# Patient Record
Sex: FEMALE | Race: BLACK OR AFRICAN AMERICAN | NOT HISPANIC OR LATINO | Employment: OTHER | URBAN - METROPOLITAN AREA
[De-identification: names, ages, dates, MRNs, and addresses within clinical notes are randomized per-mention and may not be internally consistent; named-entity substitution may affect disease eponyms.]

---

## 2017-01-05 ENCOUNTER — TELEPHONE (OUTPATIENT)
Dept: OPHTHALMOLOGY | Facility: CLINIC | Age: 72
End: 2017-01-05

## 2017-01-05 ENCOUNTER — OFFICE VISIT (OUTPATIENT)
Dept: FAMILY MEDICINE | Facility: CLINIC | Age: 72
End: 2017-01-05
Payer: MEDICARE

## 2017-01-05 ENCOUNTER — LAB VISIT (OUTPATIENT)
Dept: LAB | Facility: HOSPITAL | Age: 72
End: 2017-01-05
Attending: FAMILY MEDICINE
Payer: MEDICARE

## 2017-01-05 VITALS
DIASTOLIC BLOOD PRESSURE: 67 MMHG | TEMPERATURE: 98 F | RESPIRATION RATE: 16 BRPM | HEART RATE: 93 BPM | HEIGHT: 66 IN | WEIGHT: 156.06 LBS | BODY MASS INDEX: 25.08 KG/M2 | SYSTOLIC BLOOD PRESSURE: 131 MMHG | OXYGEN SATURATION: 98 %

## 2017-01-05 DIAGNOSIS — M70.61 TROCHANTERIC BURSITIS OF RIGHT HIP: ICD-10-CM

## 2017-01-05 DIAGNOSIS — N18.30 CKD (CHRONIC KIDNEY DISEASE) STAGE 3, GFR 30-59 ML/MIN: ICD-10-CM

## 2017-01-05 DIAGNOSIS — S46.911A MUSCLE STRAIN, SHOULDER REGION, RIGHT, INITIAL ENCOUNTER: Primary | ICD-10-CM

## 2017-01-05 DIAGNOSIS — Z23 IMMUNIZATION DUE: ICD-10-CM

## 2017-01-05 LAB
ANION GAP SERPL CALC-SCNC: 7 MMOL/L
BUN SERPL-MCNC: 21 MG/DL
CALCIUM SERPL-MCNC: 9.4 MG/DL
CHLORIDE SERPL-SCNC: 107 MMOL/L
CO2 SERPL-SCNC: 28 MMOL/L
CREAT SERPL-MCNC: 0.9 MG/DL
EST. GFR  (AFRICAN AMERICAN): >60 ML/MIN/1.73 M^2
EST. GFR  (NON AFRICAN AMERICAN): >60 ML/MIN/1.73 M^2
GLUCOSE SERPL-MCNC: 94 MG/DL
POTASSIUM SERPL-SCNC: 4.5 MMOL/L
SODIUM SERPL-SCNC: 142 MMOL/L

## 2017-01-05 PROCEDURE — 80048 BASIC METABOLIC PNL TOTAL CA: CPT

## 2017-01-05 PROCEDURE — 99214 OFFICE O/P EST MOD 30 MIN: CPT | Mod: S$GLB,,, | Performed by: FAMILY MEDICINE

## 2017-01-05 PROCEDURE — 99499 UNLISTED E&M SERVICE: CPT | Mod: S$PBB,,, | Performed by: FAMILY MEDICINE

## 2017-01-05 PROCEDURE — 1159F MED LIST DOCD IN RCRD: CPT | Mod: S$GLB,,, | Performed by: FAMILY MEDICINE

## 2017-01-05 PROCEDURE — 1126F AMNT PAIN NOTED NONE PRSNT: CPT | Mod: S$GLB,,, | Performed by: FAMILY MEDICINE

## 2017-01-05 PROCEDURE — 3078F DIAST BP <80 MM HG: CPT | Mod: S$GLB,,, | Performed by: FAMILY MEDICINE

## 2017-01-05 PROCEDURE — 1157F ADVNC CARE PLAN IN RCRD: CPT | Mod: S$GLB,,, | Performed by: FAMILY MEDICINE

## 2017-01-05 PROCEDURE — G0009 ADMIN PNEUMOCOCCAL VACCINE: HCPCS | Mod: S$GLB,,, | Performed by: FAMILY MEDICINE

## 2017-01-05 PROCEDURE — 36415 COLL VENOUS BLD VENIPUNCTURE: CPT | Mod: PO

## 2017-01-05 PROCEDURE — 3075F SYST BP GE 130 - 139MM HG: CPT | Mod: S$GLB,,, | Performed by: FAMILY MEDICINE

## 2017-01-05 PROCEDURE — 90670 PCV13 VACCINE IM: CPT | Mod: S$GLB,,, | Performed by: FAMILY MEDICINE

## 2017-01-05 PROCEDURE — 99999 PR PBB SHADOW E&M-EST. PATIENT-LVL III: CPT | Mod: PBBFAC,,, | Performed by: FAMILY MEDICINE

## 2017-01-05 PROCEDURE — 1160F RVW MEDS BY RX/DR IN RCRD: CPT | Mod: S$GLB,,, | Performed by: FAMILY MEDICINE

## 2017-01-05 RX ORDER — ASPIRIN 81 MG/1
162 TABLET ORAL DAILY
COMMUNITY
End: 2020-09-14

## 2017-01-05 RX ORDER — TIZANIDINE 4 MG/1
4 TABLET ORAL 3 TIMES DAILY PRN
Qty: 60 TABLET | Refills: 2 | Status: SHIPPED | OUTPATIENT
Start: 2017-01-05 | End: 2017-01-15

## 2017-01-05 NOTE — TELEPHONE ENCOUNTER
----- Message from Joanie Jorge sent at 1/5/2017  2:18 PM CST -----  Contact: self [phone]  Patient is requesting a call back from the nurse stated concerns about cataract surgery.    Please call the patient upon request at phone number [phone].

## 2017-01-05 NOTE — MR AVS SNAPSHOT
[place] [facility]  [address]  [place] [address]  Phone: [phone]  Fax: [phone]                  Erica Masterson   2017 9:40 AM   Office Visit    Description:  Female : 1945   Provider:  Narayan Myers MD   Department:  [place] [facility]           Reason for Visit     Shoulder Pain     Chronic Kidney Disease           Diagnoses this Visit        Comments    Muscle strain, shoulder region, right, initial encounter    -  Primary     Trochanteric bursitis of right hip         Immunization due         CKD (chronic kidney disease) stage 3, GFR 30-59 ml/min         BMI 25.0-25.9,adult                To Do List           Future Appointments        Provider Department Dept Phone    2017 1:45 PM [facility] MARTINEZ [facility] [place] [facility] - Lab [phone]    2017 9:00 AM [facility] [phone]    2017 9:30 AM Adriel Iverson MD [place] - Orthopedics [phone]    2017 3:00 PM [facility] [phone]      Goals (5 Years of Data)     None       These Medications        Disp Refills Start End    tizanidine (ZANAFLEX) 4 MG tablet 60 tablet 2 2017 1/15/2017    Take 1 tablet (4 mg total) by mouth 3 (three) times daily as needed (muscle pain). - Oral    Pharmacy: [id number] - [place] - [address]. Ph #: [phone]         [facility] On Call     [facility] On Call Nurse Care Line -  Assistance  Registered nurses in the [facility] On Call Center provide clinical advisement, health education, appointment booking, and other advisory services.  Call for this free service at [phone].             Medications           Message regarding Medications     Verify the changes and/or additions to your medication regime listed below are the same as discussed with your clinician today.  If any of these changes or additions are incorrect, please notify your  "healthcare provider.        START taking these NEW medications        Refills    tizanidine (ZANAFLEX) 4 MG tablet 2    Sig: Take 1 tablet (4 mg total) by mouth 3 (three) times daily as needed (muscle pain).    Class: Normal    Route: Oral      STOP taking these medications     lorazepam (ATIVAN) 1 MG tablet Take 0.5 mg by mouth every 8 (eight) hours as needed for Anxiety.    trazodone (DESYREL) 50 MG tablet Take 1 tablet (50 mg total) by mouth every evening.    valacyclovir (VALTREX) 1000 MG tablet One tab PO TWICE DAILY x 2 days PRN flare           Verify that the below list of medications is an accurate representation of the medications you are currently taking.  If none reported, the list may be blank. If incorrect, please contact your healthcare provider. Carry this list with you in case of emergency.           Current Medications     aspirin (ECOTRIN) 81 MG EC tablet Take 81 mg by mouth once daily.    atorvastatin (LIPITOR) 40 MG tablet TAKE ONE TABLET BY MOUTH ONCE DAILY    lisinopril (PRINIVIL,ZESTRIL) 20 MG tablet Take 1 tablet (20 mg total) by mouth once daily.    multivitamin capsule Take 1 capsule by mouth once daily. 1 Capsule(s) Oral  Every day.    penciclovir (DENAVIR) 1 % cream Apply topically every 2 (two) hours.    povidone (SOOTHE HYDRATION) 1.25 % Drop Apply 1 drop to eye once daily.     clopidogrel (PLAVIX) 75 mg tablet Take 1 tablet (75 mg total) by mouth once daily.    tizanidine (ZANAFLEX) 4 MG tablet Take 1 tablet (4 mg total) by mouth 3 (three) times daily as needed (muscle pain).           Clinical Reference Information           Vital Signs - Last Recorded  Most recent update: 1/5/2017  9:54 AM by Lesley Rubin MA    BP Pulse Temp Resp Ht Wt    131/67 (BP Location: Right arm, Patient Position: Sitting, BP Method: Automatic) 93 97.6 °F (36.4 °C) (Oral) 16 5' 6" (1.676 m) 70.8 kg (156 lb 1.4 oz)    SpO2 BMI             98% 25.19 kg/m2         Blood Pressure          Most Recent Value    BP "  131/67      Allergies as of 1/5/2017     No Known Drug Allergies      Immunizations Administered on Date of Encounter - 1/5/2017     Name Date Dose VIS Date Route    Pneumococcal Conjugate - 13 Valent 1/5/2017 0.5 mL 11/5/2015 Intramuscular      Orders Placed During Today's Visit      Normal Orders This Visit    Pneumococcal Conjugate Vaccine (13 Valent) (IM)     Future Labs/Procedures Expected by Expires    Basic metabolic panel  1/5/2017 1/6/2018

## 2017-01-05 NOTE — PROGRESS NOTES
Subjective:       Patient ID: Erica Masterson is a 71 y.o. female.    Chief Complaint: Shoulder Pain (right ) and Chronic Kidney Disease    HPI Comments: 71 year old female in for several problems.  She was having scattered areas of numbness over the entire body particularly the hands and feet as well as the right shoulder.  She went to the ER at [facility] where workup was negative and she was told to take plavix in place of aspirin.  It did not help but she started getting massages several times a week and that resolved the problem except for pain and myalgia in the right upper shoulder area.  She has an appt upcoming with dr Iverson for that.  She would like to resume the aspirin and stop the plavix due to cost if possible.  She is also concerned about stage three ckd, last checked in September.    Past Medical History:    Allergy                                                       Cataract                                                      Colon polyp                                                   Degenerative arthritis of knee                  4/3/2012      Depression                                      3/26/2012     Diverticulosis                                                GERD (gastroesophageal reflux disease)                        Hyperlipidemia                                                Hypertension                                                  Multinodular goiter                             3/26/2012     Myopathy, unspecified                           1/18/2010     Tuberculosis                                                  Past Surgical History:    FOOT SURGERY                                                   HYSTERECTOMY                                                   SHOULDER SURGERY                                                 Comment:francis     LUNG LOBECTOMY                                   1966            Comment:right middle lobectomy, due to Tb    KNEE SURGERY                                      06/06/2013      Comment:right knee tear Dr Iverson     COLONOSCOPY                                      12/2/15         Comment:Dr. Britton, multiple polyps, recheck five                years-three years if more than 2 polyps are                adenomatous    COLONOSCOPY                                     N/A 12/2/2015     DEXA SCAN due on 09/14/2015  Due for prevacid also      Shoulder Pain    Associated symptoms include numbness.     Review of Systems   Musculoskeletal: Positive for arthralgias and myalgias.   Neurological: Positive for numbness.       Objective:      Physical Exam   Constitutional: She appears well-developed and well-nourished. No distress.   Good blood pressure control  Patient is normal weight with bmi of 25.2.   Weight is increased by 0.7lb since last visit of 9/27/16.     Musculoskeletal:        Right shoulder: She exhibits tenderness (tender over upper trapezius with palpable trigger points, upper pectoralis major and serratus anterior) and crepitus (mild). She exhibits normal range of motion, no bony tenderness, no swelling, no effusion, no deformity and no spasm.        Left shoulder: Normal.   Skin: Skin is warm and dry. She is not diaphoretic.   Psychiatric: She has a normal mood and affect. Her behavior is normal. Judgment and thought content normal.   Nursing note and vitals reviewed.      Assessment:       1. Muscle strain, shoulder region, right, initial encounter    2. Trochanteric bursitis of right hip    3. Immunization due    4. CKD (chronic kidney disease) stage 3, GFR 30-59 ml/min    5. BMI 25.0-25.9,adult        Plan:       1. Muscle strain, shoulder region, right, initial encounter  - tizanidine (ZANAFLEX) 4 MG tablet; Take 1 tablet (4 mg total) by mouth 3 (three) times daily as needed (muscle pain).  Dispense: 60 tablet; Refill: 2    2. Trochanteric bursitis of right hip    3. Immunization due  - Pneumococcal Conjugate Vaccine (13 Valent)  (IM)    4. CKD (chronic kidney disease) stage 3, GFR 30-59 ml/min  - Basic metabolic panel; Future    5. BMI 25.0-25.9,adult

## 2017-01-16 DIAGNOSIS — E78.5 HYPERLIPIDEMIA, UNSPECIFIED HYPERLIPIDEMIA TYPE: ICD-10-CM

## 2017-01-16 RX ORDER — ATORVASTATIN CALCIUM 40 MG/1
40 TABLET, FILM COATED ORAL DAILY
Qty: 90 TABLET | Refills: 2 | Status: SHIPPED | OUTPATIENT
Start: 2017-01-16 | End: 2018-01-14 | Stop reason: SDUPTHER

## 2017-01-23 ENCOUNTER — INITIAL CONSULT (OUTPATIENT)
Dept: OPHTHALMOLOGY | Facility: CLINIC | Age: 72
End: 2017-01-23
Payer: MEDICARE

## 2017-01-23 DIAGNOSIS — H25.13 NUCLEAR SCLEROSIS, BILATERAL: ICD-10-CM

## 2017-01-23 DIAGNOSIS — H26.9 CORTICAL CATARACT OF BOTH EYES: ICD-10-CM

## 2017-01-23 DIAGNOSIS — H52.4 HYPEROPIA WITH ASTIGMATISM AND PRESBYOPIA, BILATERAL: ICD-10-CM

## 2017-01-23 DIAGNOSIS — H52.203 HYPEROPIA WITH ASTIGMATISM AND PRESBYOPIA, BILATERAL: ICD-10-CM

## 2017-01-23 DIAGNOSIS — H52.03 HYPEROPIA WITH ASTIGMATISM AND PRESBYOPIA, BILATERAL: ICD-10-CM

## 2017-01-23 DIAGNOSIS — H40.003 GLAUCOMA SUSPECT, BILATERAL: Primary | ICD-10-CM

## 2017-01-23 PROCEDURE — 99499 UNLISTED E&M SERVICE: CPT | Mod: S$PBB,,, | Performed by: OPHTHALMOLOGY

## 2017-01-23 PROCEDURE — 92134 CPTRZ OPH DX IMG PST SGM RTA: CPT | Mod: S$GLB,,, | Performed by: OPHTHALMOLOGY

## 2017-01-23 PROCEDURE — 99999 PR PBB SHADOW E&M-EST. PATIENT-LVL II: CPT | Mod: PBBFAC,,, | Performed by: OPHTHALMOLOGY

## 2017-01-23 PROCEDURE — 92015 DETERMINE REFRACTIVE STATE: CPT | Mod: S$GLB,,, | Performed by: OPHTHALMOLOGY

## 2017-01-23 PROCEDURE — 92014 COMPRE OPH EXAM EST PT 1/>: CPT | Mod: S$GLB,,, | Performed by: OPHTHALMOLOGY

## 2017-01-23 NOTE — MR AVS SNAPSHOT
[place] [facility] - Ophthalmology  [address] [place] [address] Drive [address]  [place] [address]  Phone: [phone]                  Erica Masterson   2017 10:00 AM   Initial consult    Description:  Female : 1945   Provider:  Ivory Munoz MD   Department:  [place] [facility] - Ophthalmology           Reason for Visit     Concerns About Ocular Health           Diagnoses this Visit        Comments    Glaucoma suspect, bilateral    -  Primary     Nuclear sclerosis, bilateral         Cortical cataract of both eyes         Hyperopia with astigmatism and presbyopia, bilateral                To Do List           Future Appointments        Provider Department Dept Phone    2017 9:15 AM Adriel Iverson MD [place] - Orthopedics [phone]    2017 3:00 PM MD [place] - Endo/Diabetes [phone]      Goals (5 Years of Data)     None      Follow-Up and Disposition     Return for HVF, IOP check.      [facility] On Call     [facility] On Call Nurse [facility] Line -  Assistance  Registered nurses in the [facility] On Call Center provide clinical advisement, health education, appointment booking, and other advisory services.  Call for this free service at [phone].             Medications           Message regarding Medications     Verify the changes and/or additions to your medication regime listed below are the same as discussed with your clinician today.  If any of these changes or additions are incorrect, please notify your healthcare provider.        STOP taking these medications     penciclovir (DENAVIR) 1 % cream Apply topically every 2 (two) hours.    clopidogrel (PLAVIX) 75 mg tablet Take 1 tablet (75 mg total) by mouth once daily.           Verify that the below list of medications is an accurate representation of the medications you are currently taking.  If none reported, the list may be blank. If incorrect, please contact your healthcare provider. Carry this list  with you in case of emergency.           Current Medications     aspirin (ECOTRIN) 81 MG EC tablet Take 162 mg by mouth once daily.     atorvastatin (LIPITOR) 40 MG tablet Take 1 tablet (40 mg total) by mouth once daily.    lisinopril (PRINIVIL,ZESTRIL) 20 MG tablet Take 1 tablet (20 mg total) by mouth once daily.    multivitamin capsule Take 1 capsule by mouth once daily. 1 Capsule(s) Oral  Every day.    povidone (SOOTHE HYDRATION) 1.25 % Drop Apply 1 drop to eye once daily.            Clinical Reference Information           Allergies as of 1/23/2017     No Known Drug Allergies      Immunizations Administered on Date of Encounter - 1/23/2017     None      Orders Placed During Today's Visit      Normal Orders This Visit    [place] Retina Tomography (HRT) - OU - Both Eyes     Future Labs/Procedures Expected by Expires    [facility] Visual Field - OU - Extended - Both Eyes  As directed 1/23/2018

## 2017-01-23 NOTE — PROGRESS NOTES
HPI     Concerns About Ocular Health    Additional comments: Ocular health exam           Comments   [facility]    Pt states has been having trouble vision at night x 1 yr.  + h/o floaters   x 2 yrs, no flashes.   + eye itch x 1-2 yrs, no tearing or burning. Uses Soothe QAM OU.     [facility] to [facility], was told she might need cataract surgery in a few   months so did not give her glasses Rx. Only drives at night if she has to,   has had trouble turning off the wrong street at the wrong place. Takes   aerobic class in evening, ends after dark.       Last edited by Ivory Munoz MD on 1/23/2017 10:55 AM.   (History)        ROS     Positive for: Gastrointestinal, Neurological (hereditary peripheral   neuropathy; denies CVA/seizure/tremor/restless legs), Cardiovascular (HTN   - controlled with meds; PAD), Heme/Lymph (ASA, no longer on plavix)    Negative for: Genitourinary (denies flomax), Endocrine (denies DM), Eyes   (denies surgery or trauma), Respiratory (denies asthma/orthopnea)    Last edited by Ivory Munoz MD on 1/23/2017 10:55 AM.   (History)        Assessment /Plan     For exam results, see Encounter Report.    Glaucoma suspect, bilateral  -     [place] Retina Tomography (HRT) - OU - Both Eyes  -     [facility] Visual Field - OU - Extended - Both Eyes; Future    Nuclear sclerosis, bilateral    Cortical cataract of both eyes    Hyperopia with astigmatism and presbyopia, bilateral          Glaucoma suspect, bilateral - C:D ratio appears increased from others documented in epic. Pt was followed before for glaucoma suspect by Dr. Stoddard, testing done back in 2006. IOP WNL. No known family history. HRT today suggests some RNFL thinning OD. RTC for HVF with IOP check.   -     [place] Retina Tomography (HRT) - OU - Both Eyes  -     [facility] Visual Field - OU - Extended - Both Eyes; Future    Nuclear sclerosis, bilateral - BAT may be visually significant OS, but not  "OD. Do not recommend cataract surgery at this time, as BCVA is good, and to do surgery on only one eye would leave her with anisometropia. Current spectacle lenses very scratched. Recommend updating spectacles, observe. Dilates only to ~4.5 mm despite denies flomax.     Cortical cataract of both eyes - "    Hyperopia with astigmatism and presbyopia, bilateral - MRx given                     "

## 2017-01-30 ENCOUNTER — HOSPITAL ENCOUNTER (OUTPATIENT)
Dept: RADIOLOGY | Facility: HOSPITAL | Age: 72
Discharge: HOME | End: 2017-01-30
Attending: ORTHOPAEDIC SURGERY
Payer: MEDICARE

## 2017-01-30 ENCOUNTER — OFFICE VISIT (OUTPATIENT)
Dept: ORTHOPEDICS | Facility: CLINIC | Age: 72
End: 2017-01-30
Payer: MEDICARE

## 2017-01-30 VITALS
DIASTOLIC BLOOD PRESSURE: 63 MMHG | HEART RATE: 71 BPM | HEIGHT: 66 IN | SYSTOLIC BLOOD PRESSURE: 136 MMHG | BODY MASS INDEX: 25.07 KG/M2 | WEIGHT: 156 LBS

## 2017-01-30 DIAGNOSIS — M25.561 RIGHT KNEE PAIN, UNSPECIFIED CHRONICITY: ICD-10-CM

## 2017-01-30 DIAGNOSIS — M75.101 ROTATOR CUFF SYNDROME, RIGHT: ICD-10-CM

## 2017-01-30 DIAGNOSIS — M25.561 RIGHT KNEE PAIN, UNSPECIFIED CHRONICITY: Primary | ICD-10-CM

## 2017-01-30 DIAGNOSIS — M25.551 RIGHT HIP PAIN: ICD-10-CM

## 2017-01-30 DIAGNOSIS — M17.11 PRIMARY OSTEOARTHRITIS OF RIGHT KNEE: Primary | ICD-10-CM

## 2017-01-30 PROCEDURE — 99214 OFFICE O/P EST MOD 30 MIN: CPT | Mod: S$GLB,,, | Performed by: ORTHOPAEDIC SURGERY

## 2017-01-30 PROCEDURE — 3078F DIAST BP <80 MM HG: CPT | Mod: S$GLB,,, | Performed by: ORTHOPAEDIC SURGERY

## 2017-01-30 PROCEDURE — 99999 PR PBB SHADOW E&M-EST. PATIENT-LVL III: CPT | Mod: PBBFAC,,, | Performed by: ORTHOPAEDIC SURGERY

## 2017-01-30 PROCEDURE — 1125F AMNT PAIN NOTED PAIN PRSNT: CPT | Mod: S$GLB,,, | Performed by: ORTHOPAEDIC SURGERY

## 2017-01-30 PROCEDURE — 73502 X-RAY EXAM HIP UNI 2-3 VIEWS: CPT | Mod: 26,RT,, | Performed by: RADIOLOGY

## 2017-01-30 PROCEDURE — 1159F MED LIST DOCD IN RCRD: CPT | Mod: S$GLB,,, | Performed by: ORTHOPAEDIC SURGERY

## 2017-01-30 PROCEDURE — 73502 X-RAY EXAM HIP UNI 2-3 VIEWS: CPT | Mod: TC,PN,RT

## 2017-01-30 PROCEDURE — 3075F SYST BP GE 130 - 139MM HG: CPT | Mod: S$GLB,,, | Performed by: ORTHOPAEDIC SURGERY

## 2017-01-30 PROCEDURE — 1160F RVW MEDS BY RX/DR IN RCRD: CPT | Mod: S$GLB,,, | Performed by: ORTHOPAEDIC SURGERY

## 2017-01-30 PROCEDURE — 73564 X-RAY EXAM KNEE 4 OR MORE: CPT | Mod: 26,RT,, | Performed by: RADIOLOGY

## 2017-01-30 PROCEDURE — 73564 X-RAY EXAM KNEE 4 OR MORE: CPT | Mod: TC,PN,RT

## 2017-01-30 PROCEDURE — 1157F ADVNC CARE PLAN IN RCRD: CPT | Mod: S$GLB,,, | Performed by: ORTHOPAEDIC SURGERY

## 2017-01-30 PROCEDURE — 73562 X-RAY EXAM OF KNEE 3: CPT | Mod: 26,59,LT, | Performed by: RADIOLOGY

## 2017-01-30 NOTE — PROGRESS NOTES
Past Medical History   Diagnosis Date    Allergy     Cataract     Colon polyp     Degenerative arthritis of knee 4/3/2012    Depression 3/26/2012    Diverticulosis     GERD (gastroesophageal reflux disease)     Hyperlipidemia     Hypertension     Multinodular goiter 3/26/2012    Myopathy, unspecified 1/18/2010    Tuberculosis        Past Surgical History   Procedure Laterality Date    Foot surgery      Hysterectomy      Shoulder surgery       francis     Lung lobectomy  1966     right middle lobectomy, due to Tb    Knee surgery  06/06/2013     right knee tear Dr Iverson     Colonoscopy  12/2/15     Dr. Britton, multiple polyps, recheck five years-three years if more than 2 polyps are adenomatous    Colonoscopy N/A 12/2/2015       Current Outpatient Prescriptions   Medication Sig    aspirin (ECOTRIN) 81 MG EC tablet Take 162 mg by mouth once daily.     atorvastatin (LIPITOR) 40 MG tablet Take 1 tablet (40 mg total) by mouth once daily.    lisinopril (PRINIVIL,ZESTRIL) 20 MG tablet Take 1 tablet (20 mg total) by mouth once daily.    multivitamin capsule Take 1 capsule by mouth once daily. 1 Capsule(s) Oral  Every day.    povidone (SOOTHE HYDRATION) 1.25 % Drop Apply 1 drop to eye once daily.      No current facility-administered medications for this visit.        Review of patient's allergies indicates:   Allergen Reactions    No known drug allergies        Family History   Problem Relation Age of Onset    Colon cancer Other     Cancer Other     Cancer Mother     Hypertension Mother     Cancer Brother     Hypertension Father     Diabetes Son     Hypertension Son     Diabetes Maternal Aunt     Alzheimer's disease Maternal Uncle     Cancer Maternal Grandmother     No Known Problems Daughter     No Known Problems Maternal Grandfather     No Known Problems Paternal Grandmother     No Known Problems Paternal Grandfather     Cancer Sister      breast    Dementia Sister     Alzheimer's  disease Sister     Obesity Paternal Uncle     Melanoma Neg Hx     Psoriasis Neg Hx     Lupus Neg Hx     Eczema Neg Hx     Amblyopia Neg Hx     Blindness Neg Hx     Cataracts Neg Hx     Glaucoma Neg Hx     Macular degeneration Neg Hx     Retinal detachment Neg Hx     Strabismus Neg Hx     Stroke Neg Hx     Thyroid disease Neg Hx        Social History     Social History    Marital status:      Spouse name: N/A    Number of children: N/A    Years of education: N/A     Occupational History    Not on file.     Social History Main Topics    Smoking status: Former Smoker    Smokeless tobacco: Never Used      Comment: quit in 1963    Alcohol use Yes      Comment: Occasionally    Drug use: No    Sexual activity: Not Currently     Other Topics Concern    Not on file     Social History Narrative       Chief Complaint:   Chief Complaint   Patient presents with    Knee Pain     right knee pain    Hip Pain     right hip pain    Shoulder Pain     right shoulder pain       History of present illness: This is a 70-year-old female who have seen for some issues who now comes in with right shoulder , hip and knee pain.  Pain in the shoulders been present for years now.  Pain laying on it at night.  Unable to reach her arm across her body or above her head.  Pain as a 6 out of 10.  Up to a 9 out of 10 at night.  Also complains of right leg and knee pain.  No injury or trauma.  She's been trying to stretch it without success.  She has a history of prior scope of the right shoulder.  She has a lot of tightness up in the neck.  Patient has been getting massage and doing some aerobics.  She is taking Zanaflex.      Review of Systems:    Constitution: Negative for chills, fever, and sweats.  Negative for unexplained weight loss.    HENT:  Negative for headaches and blurry vision.    Cardiovascular:Negative for chest pain or irregular heart beat. Negative for hypertension.    Respiratory:  Negative for cough  and shortness of breath.    Gastrointestinal: Negative for abdominal pain, heartburn, melena, nausea, and vomitting.    Genitourinary:  Negative bladder incontinence and dysuria.    Musculoskeletal:  See HPI    Neurological: Negative for numbness.    Psychiatric/Behavioral: Negative for depression.  The patient is not nervous/anxious.      Endocrine: Negative for polyuria    Hematologic/Lymphatic: Negative for bleeding problem.  Does not bruise/bleed easily.    Skin: Negative for poor would healing and rash      Physical Examination:    Vital Signs:    Vitals:    01/30/17 0914   BP: 136/63   Pulse: 71       Body mass index is 25.18 kg/(m^2).    This a well-developed, well nourished patient in no acute distress.  They are alert and oriented and cooperative to examination.  Pt. walks without an antalgic gait.      Examination of the right shoulder shows no rashes or erythema. There are no masses, ecchymosis, or atrophy. The patient has full range of motion in forward flexion, external rotation, and internal rotation to the mid T-spine. The patient has mildly positive impingement signs. - Sibley's test. - Speeds test. Nontender to palpation over a.c. joint. Normal stability anteriorly, posteriorly, and negative sulcus sign. Passive range of motion: Forward flexion of 180°, external rotation at 90° of 90°, internal rotation of 50°, and external rotation at 0° of 50°. 2+ radial pulse. Intact axillary, radial, median and ulnar sensation. 5 out of 5 resisted forward flexion, external rotation, and negative lift off test.    Examination of the right knee shows no rashes or erythema. There are no masses ecchymosis or effusion. Patient has full range of motion from 0-130°. Patient is mildly tender to palpation over lateral joint line and nontender to palpation over the medial joint line. Patient has a - Lachman exam, - anterior drawer exam, and - posterior drawer exam. - Nicole's exam. Knee is stable to varus and valgus  stress. 5 out of 5 motor strength. Palpable distal pulses. Intact light touch sensation. Negative Patellofemoral crepitus      X-rays: X-rays the right shoulder are   reviewed which show no sign of acute fracture or injury  X-ray of the right hip are ordered and reviewed which show well-preserved joint space  X-rays of the right knee are ordered and reviewed which show complete lateral joint space narrowing     Assessment:: Right rotator cuff tendinitis  Right knee arthritis     Plan:  I reviewed the x-ray results with her today.  She has a lot of pain in her neck.  She has great strength in her shoulder do not think she has a new tear.  I recommended some physical therapy to see if we can't get her loosened up.  He also talked about her knee arthritis.  I told her that she needs to consider knee replacement in the next several years.  This is causing her calf and thigh pain.  Follow-up as needed.

## 2017-01-30 NOTE — MR AVS SNAPSHOT
[facility] Orthopedics  [address] [place] [address] Drive  [place] [address]  Phone: [phone]                  Erica Masterson   2017 9:15 AM   Office Visit    Description:  Female : 1945   Provider:  Adriel Iverson MD   Department:  [facility] Orthopedics           Reason for Visit     Knee Pain     Hip Pain     Shoulder Pain           Diagnoses this Visit        Comments    Primary osteoarthritis of right knee    -  Primary     Rotator cuff syndrome, right                To Do List           Future Appointments        Provider Department Dept Phone    2017 3:00 PM MD [place] - Endo/Diabetes [phone]    2017 9:00 AM VISUAL MORRELL [place] [facility] - Ophthalmology [phone]    2017 9:30 AM Ivory Munoz MD [place] [address] - Ophthalmology [phone]      Goals (5 Years of Data)     None      [facility] On Call     [facility] On Call Nurse [facility] Line -  Assistance  Registered nurses in the [facility] On Call Center provide clinical advisement, health education, appointment booking, and other advisory services.  Call for this free service at [phone].             Medications           Message regarding Medications     Verify the changes and/or additions to your medication regime listed below are the same as discussed with your clinician today.  If any of these changes or additions are incorrect, please notify your healthcare provider.             Verify that the below list of medications is an accurate representation of the medications you are currently taking.  If none reported, the list may be blank. If incorrect, please contact your healthcare provider. Carry this list with you in case of emergency.           Current Medications     aspirin (ECOTRIN) 81 MG EC tablet Take 162 mg by mouth once daily.     atorvastatin (LIPITOR) 40 MG tablet Take 1 tablet (40 mg total) by mouth once daily.    lisinopril (PRINIVIL,ZESTRIL) 20 MG tablet Take 1  "tablet (20 mg total) by mouth once daily.    multivitamin capsule Take 1 capsule by mouth once daily. 1 Capsule(s) Oral  Every day.    povidone (SOOTHE HYDRATION) 1.25 % Drop Apply 1 drop to eye once daily.            Clinical Reference Information           Vital Signs - Last Recorded  Most recent update: 1/30/2017  9:29 AM by Silvina Garcia LPN    BP Pulse Ht Wt BMI    136/63 71 5' 6" (1.676 m) 70.8 kg (156 lb) 25.18 kg/m2      Blood Pressure          Most Recent Value    BP  136/63      Allergies as of 1/30/2017     No Known Drug Allergies      Immunizations Administered on Date of Encounter - 1/30/2017     None      "

## 2017-02-24 ENCOUNTER — DOCUMENTATION ONLY (OUTPATIENT)
Dept: FAMILY MEDICINE | Facility: CLINIC | Age: 72
End: 2017-02-24

## 2017-02-24 NOTE — PROGRESS NOTES
Pre-Visit Chart Review  For Appointment Scheduled on 3-21-17    Health Maintenance Due   Topic Date Due    DEXA SCAN  09/14/2015

## 2017-03-03 ENCOUNTER — DOCUMENTATION ONLY (OUTPATIENT)
Dept: FAMILY MEDICINE | Facility: CLINIC | Age: 72
End: 2017-03-03

## 2017-03-03 NOTE — PROGRESS NOTES
Pre-Visit Chart Review  For Appointment Scheduled on 03/03/2017      Health Maintenance Due   Topic Date Due    DEXA SCAN  09/14/2015

## 2017-03-06 ENCOUNTER — OFFICE VISIT (OUTPATIENT)
Dept: ORTHOPEDICS | Facility: CLINIC | Age: 72
End: 2017-03-06
Payer: MEDICARE

## 2017-03-06 VITALS
HEIGHT: 66 IN | DIASTOLIC BLOOD PRESSURE: 71 MMHG | HEART RATE: 67 BPM | WEIGHT: 156 LBS | BODY MASS INDEX: 25.07 KG/M2 | SYSTOLIC BLOOD PRESSURE: 157 MMHG

## 2017-03-06 DIAGNOSIS — M75.101 BILATERAL ROTATOR CUFF SYNDROME: ICD-10-CM

## 2017-03-06 DIAGNOSIS — M75.102 BILATERAL ROTATOR CUFF SYNDROME: ICD-10-CM

## 2017-03-06 DIAGNOSIS — M17.11 PRIMARY OSTEOARTHRITIS OF RIGHT KNEE: Primary | ICD-10-CM

## 2017-03-06 PROCEDURE — 99999 PR PBB SHADOW E&M-EST. PATIENT-LVL III: CPT | Mod: PBBFAC,,, | Performed by: ORTHOPAEDIC SURGERY

## 2017-03-06 PROCEDURE — 1159F MED LIST DOCD IN RCRD: CPT | Mod: S$GLB,,, | Performed by: ORTHOPAEDIC SURGERY

## 2017-03-06 PROCEDURE — 99213 OFFICE O/P EST LOW 20 MIN: CPT | Mod: S$GLB,,, | Performed by: ORTHOPAEDIC SURGERY

## 2017-03-06 PROCEDURE — 3077F SYST BP >= 140 MM HG: CPT | Mod: S$GLB,,, | Performed by: ORTHOPAEDIC SURGERY

## 2017-03-06 PROCEDURE — 1157F ADVNC CARE PLAN IN RCRD: CPT | Mod: S$GLB,,, | Performed by: ORTHOPAEDIC SURGERY

## 2017-03-06 PROCEDURE — 1125F AMNT PAIN NOTED PAIN PRSNT: CPT | Mod: S$GLB,,, | Performed by: ORTHOPAEDIC SURGERY

## 2017-03-06 PROCEDURE — 1160F RVW MEDS BY RX/DR IN RCRD: CPT | Mod: S$GLB,,, | Performed by: ORTHOPAEDIC SURGERY

## 2017-03-06 PROCEDURE — 3078F DIAST BP <80 MM HG: CPT | Mod: S$GLB,,, | Performed by: ORTHOPAEDIC SURGERY

## 2017-03-06 NOTE — PROGRESS NOTES
Past Medical History:   Diagnosis Date    Allergy     Cataract     Colon polyp     Degenerative arthritis of knee 4/3/2012    Depression 3/26/2012    Diverticulosis     GERD (gastroesophageal reflux disease)     Hyperlipidemia     Hypertension     Multinodular goiter 3/26/2012    Myopathy, unspecified 1/18/2010    Tuberculosis        Past Surgical History:   Procedure Laterality Date    COLONOSCOPY  12/2/15    Dr. Britton, multiple polyps, recheck five years-three years if more than 2 polyps are adenomatous    COLONOSCOPY N/A 12/2/2015    FOOT SURGERY      HYSTERECTOMY      KNEE SURGERY  06/06/2013    right knee tear Dr Iverson     LUNG LOBECTOMY  1966    right middle lobectomy, due to Tb    SHOULDER SURGERY      francis        Current Outpatient Prescriptions   Medication Sig    aspirin (ECOTRIN) 81 MG EC tablet Take 162 mg by mouth once daily.     atorvastatin (LIPITOR) 40 MG tablet Take 1 tablet (40 mg total) by mouth once daily.    lisinopril (PRINIVIL,ZESTRIL) 20 MG tablet Take 1 tablet (20 mg total) by mouth once daily.    multivitamin capsule Take 1 capsule by mouth once daily. 1 Capsule(s) Oral  Every day.    povidone (SOOTHE HYDRATION) 1.25 % Drop Apply 1 drop to eye once daily.      No current facility-administered medications for this visit.        Review of patient's allergies indicates:   Allergen Reactions    No known drug allergies        Family History   Problem Relation Age of Onset    Colon cancer Other     Cancer Other     Cancer Mother     Hypertension Mother     Cancer Brother     Hypertension Father     Diabetes Son     Hypertension Son     Diabetes Maternal Aunt     Alzheimer's disease Maternal Uncle     Cancer Maternal Grandmother     No Known Problems Daughter     No Known Problems Maternal Grandfather     No Known Problems Paternal Grandmother     No Known Problems Paternal Grandfather     Cancer Sister      breast    Dementia Sister     Alzheimer's  disease Sister     Obesity Paternal Uncle     Melanoma Neg Hx     Psoriasis Neg Hx     Lupus Neg Hx     Eczema Neg Hx     Amblyopia Neg Hx     Blindness Neg Hx     Cataracts Neg Hx     Glaucoma Neg Hx     Macular degeneration Neg Hx     Retinal detachment Neg Hx     Strabismus Neg Hx     Stroke Neg Hx     Thyroid disease Neg Hx        Social History     Social History    Marital status:      Spouse name: N/A    Number of children: N/A    Years of education: N/A     Occupational History    Not on file.     Social History Main Topics    Smoking status: Former Smoker    Smokeless tobacco: Never Used      Comment: quit in 1963    Alcohol use Yes      Comment: Occasionally    Drug use: No    Sexual activity: Not Currently     Other Topics Concern    Not on file     Social History Narrative       Chief Complaint:   Chief Complaint   Patient presents with    Right Shoulder - Pain       History: This is a 70-year-old female who have seen for some issues who now comes in with right shoulder , hip and knee pain.  Pain in the shoulders been present for years now.  Pain laying on it at night.  Unable to reach her arm across her body or above her head.  Pain as a 6 out of 10.  Up to a 9 out of 10 at night.  Also complains of right leg and knee pain.  No injury or trauma.  She's been trying to stretch it without success.  She has a history of prior scope of the right shoulder.  She has a lot of tightness up in the neck.  Patient has been getting massage and doing some aerobics.  She is taking Zanaflex.    Present: She's been in physical therapy at [facility] for her right shoulder and is doing better than she is done in years.  She like to go ahead and continue doing it and that her left shoulder and hips.  Pain is down to a 3 out of 10.  Minimal night pain.  Tightness is much improved.      Review of Systems:    Constitution: Negative for chills, fever, and sweats.  Negative for unexplained  weight loss.    HENT:  Negative for headaches and blurry vision.    Cardiovascular:Negative for chest pain or irregular heart beat. Negative for hypertension.    Respiratory:  Negative for cough and shortness of breath.    Gastrointestinal: Negative for abdominal pain, heartburn, melena, nausea, and vomitting.    Genitourinary:  Negative bladder incontinence and dysuria.    Musculoskeletal:  See HPI    Neurological: Negative for numbness.    Psychiatric/Behavioral: Negative for depression.  The patient is not nervous/anxious.      Endocrine: Negative for polyuria    Hematologic/Lymphatic: Negative for bleeding problem.  Does not bruise/bleed easily.    Skin: Negative for poor would healing and rash      Physical Examination:    Vital Signs:    Vitals:    03/06/17 1127   BP: (!) 157/71   Pulse: 67       Body mass index is 25.18 kg/(m^2).    This a well-developed, well nourished patient in no acute distress.  They are alert and oriented and cooperative to examination.  Pt. walks without an antalgic gait.      Examination of the right shoulder shows no rashes or erythema. There are no masses, ecchymosis, or atrophy. The patient has full range of motion in forward flexion, external rotation, and internal rotation to the mid T-spine. The patient has mildly positive impingement signs.Passive range of motion: Forward flexion of 180°, external rotation at 90° of 90°, internal rotation of 50°, and external rotation at 0° of 50°. 2+ radial pulse. Intact axillary, radial, median and ulnar sensation. 5 out of 5 resisted forward flexion, external rotation, and negative lift off test.    Examination of the right knee shows no rashes or erythema. There are no masses ecchymosis or effusion. Patient has full range of motion from 0-130°. Patient is mildly tender to palpation over lateral joint line and nontender to palpation over the medial joint line. Knee is stable to varus and valgus stress. 5 out of 5 motor strength. Palpable  distal pulses. Intact light touch sensation. Negative Patellofemoral crepitus      X-rays: X-rays the right shoulder are reviewed which show no sign of acute fracture or injury  X-ray of the right hip are  reviewed which show well-preserved joint space  X-rays of the right knee are reviewed which show complete lateral joint space narrowing     Assessment:: Right rotator cuff tendinitis  Right knee arthritis     Plan:  I gave her another prescription to continue with the physical therapy since it seems to be helping so well.  We will have them work on her left shoulder and hips as well.  Follow-up as needed.

## 2017-03-21 ENCOUNTER — OFFICE VISIT (OUTPATIENT)
Dept: FAMILY MEDICINE | Facility: CLINIC | Age: 72
End: 2017-03-21
Payer: MEDICARE

## 2017-03-21 VITALS
HEART RATE: 83 BPM | BODY MASS INDEX: 25.44 KG/M2 | SYSTOLIC BLOOD PRESSURE: 116 MMHG | TEMPERATURE: 98 F | HEIGHT: 66 IN | DIASTOLIC BLOOD PRESSURE: 62 MMHG | WEIGHT: 158.31 LBS | OXYGEN SATURATION: 98 % | RESPIRATION RATE: 16 BRPM

## 2017-03-21 DIAGNOSIS — M75.101 ROTATOR CUFF SYNDROME OF RIGHT SHOULDER: Primary | ICD-10-CM

## 2017-03-21 DIAGNOSIS — L75.0 ABNORMAL BODY ODOR: ICD-10-CM

## 2017-03-21 DIAGNOSIS — Z78.0 POST-MENOPAUSE: ICD-10-CM

## 2017-03-21 PROCEDURE — 1126F AMNT PAIN NOTED NONE PRSNT: CPT | Mod: S$GLB,,, | Performed by: FAMILY MEDICINE

## 2017-03-21 PROCEDURE — 1157F ADVNC CARE PLAN IN RCRD: CPT | Mod: S$GLB,,, | Performed by: FAMILY MEDICINE

## 2017-03-21 PROCEDURE — 3078F DIAST BP <80 MM HG: CPT | Mod: S$GLB,,, | Performed by: FAMILY MEDICINE

## 2017-03-21 PROCEDURE — 99213 OFFICE O/P EST LOW 20 MIN: CPT | Mod: S$GLB,,, | Performed by: FAMILY MEDICINE

## 2017-03-21 PROCEDURE — 1159F MED LIST DOCD IN RCRD: CPT | Mod: S$GLB,,, | Performed by: FAMILY MEDICINE

## 2017-03-21 PROCEDURE — 1160F RVW MEDS BY RX/DR IN RCRD: CPT | Mod: S$GLB,,, | Performed by: FAMILY MEDICINE

## 2017-03-21 PROCEDURE — 3074F SYST BP LT 130 MM HG: CPT | Mod: S$GLB,,, | Performed by: FAMILY MEDICINE

## 2017-03-21 PROCEDURE — 99999 PR PBB SHADOW E&M-EST. PATIENT-LVL III: CPT | Mod: PBBFAC,,, | Performed by: FAMILY MEDICINE

## 2017-03-21 RX ORDER — TIZANIDINE 4 MG/1
4 TABLET ORAL 3 TIMES DAILY PRN
COMMUNITY
Start: 2017-03-20 | End: 2018-07-23

## 2017-03-21 NOTE — MR AVS SNAPSHOT
[facility]  [address] [place] [address]  Phone: [phone]  Fax: [phone]                  Erica Masterson   3/21/2017 3:20 PM   Office Visit    Description:  Female : 1945   Provider:  Narayan Myers MD   Department:  [facility]           Reason for Visit     Follow-up           Diagnoses this Visit        Comments    Rotator cuff syndrome of right shoulder    -  Primary     Post-menopause         Abnormal body odor                To Do List           Future Appointments        Provider Department Dept Phone    3/22/2017 3:00 PM Jose Britton MD [facility] Gastroenterology [phone]    3/23/2017 9:30 AM Adriel Iverson MD [facility] Orthopedics [phone]    3/29/2017 10:00 AM SLIC DEXA1 [facility] [phone]    2017 9:00 AM VISUAL MORRELL [facility] - Ophthalmology [phone]    2017 9:30 AM Ivory Munoz MD [facility] - Ophthalmology [phone]      Goals (5 Years of Data)     None      [facility]     [facility] Care [facility] -  Assistance  Registered nurses in the [facility] provide clinical advisement, health education, appointment booking, and other advisory services.  Call for this free service at [phone].             Medications           Message regarding Medications     Verify the changes and/or additions to your medication regime listed below are the same as discussed with your clinician today.  If any of these changes or additions are incorrect, please notify your healthcare provider.             Verify that the below list of medications is an accurate representation of the medications you are currently taking.  If none reported, the list may be blank. If incorrect, please contact your healthcare provider. Carry this list with you in case of emergency.           Current Medications     aspirin (ECOTRIN) 81 MG EC tablet Take 162 mg by  "mouth once daily.     atorvastatin (LIPITOR) 40 MG tablet Take 1 tablet (40 mg total) by mouth once daily.    lisinopril (PRINIVIL,ZESTRIL) 20 MG tablet Take 1 tablet (20 mg total) by mouth once daily.    multivitamin capsule Take 1 capsule by mouth once daily. 1 Capsule(s) Oral  Every day.    povidone (SOOTHE HYDRATION) 1.25 % Drop Apply 1 drop to eye once daily.     tizanidine (ZANAFLEX) 4 MG tablet Take 4 mg by mouth 3 (three) times daily as needed.            Clinical Reference Information           Your Vitals Were     BP Pulse Temp Resp Height Weight    116/62 (BP Location: Right arm, Patient Position: Sitting, BP Method: Automatic) 83 98.3 °F (36.8 °C) (Oral) 16 5' 6" (1.676 m) 71.8 kg (158 lb 4.6 oz)    SpO2 BMI             98% 25.55 kg/m2         Blood Pressure          Most Recent Value    BP  116/62      Allergies as of 3/21/2017     No Known Drug Allergies      Immunizations Administered on Date of Encounter - 3/21/2017     None      Orders Placed During Today's Visit     Future Labs/Procedures Expected by Expires    DXA Bone Density Spine And Hip  3/21/2017 3/21/2018      Language Assistance Services     ATTENTION: Language assistance services are available, free of charge. Please call [phone].      ATENCIÓN: Si habla español, tiene a montelongo disposición servicios gratuitos de asistencia lingüística. Llame al [phone].     SAGAR Ý: N?u b?n nói Ti?ng Vi?t, có các d?ch v? h? tr? ngôn ng? mi?n phí dành cho b?n. G?i s? [phone].         [facility] complies with applicable Federal civil rights laws and does not discriminate on the basis of race, color, national origin, age, disability, or sex.        "

## 2017-03-21 NOTE — PROGRESS NOTES
"Subjective:       Patient ID: Erica Masterson is a 71 y.o. female.    Chief Complaint: Follow-up (arm/swallowing / odor )    HPI Comments: 71 year old female with right rotator cuff problem seeing Dr. Iverson and completing a course of physical therapy which has recently been extended to care for a hip problem as well.  The patient comes in stating the shoulder is better but when she lays on it at night it hurts in the morning.  She also reports an abnormal odor "like dried blood after surgery" in the perianal area.  She denies hemorrhoids and has no bowel changes.    Past Medical History:  No date: Allergy  No date: Cataract  No date: Colon polyp  4/3/2012: Degenerative arthritis of knee  3/26/2012: Depression  No date: Diverticulosis  No date: GERD (gastroesophageal reflux disease)  No date: Hyperlipidemia  No date: Hypertension  3/26/2012: Multinodular goiter  1/18/2010: Myopathy, unspecified  No date: Tuberculosis    Past Surgical History:  12/2/15: COLONOSCOPY      Comment: Dr. Britton, multiple polyps, recheck five                years-three years if more than 2 polyps are                adenomatous  12/2/2015: COLONOSCOPY N/A  No date: FOOT SURGERY  No date: HYSTERECTOMY  06/06/2013: KNEE SURGERY      Comment: right knee tear Dr Iverson   1966: LUNG LOBECTOMY      Comment: right middle lobectomy, due to Tb  No date: SHOULDER SURGERY      Comment: francis         Review of Systems   Musculoskeletal: Positive for arthralgias.       Objective:      Physical Exam   Constitutional: She is oriented to person, place, and time. She appears well-developed and well-nourished. No distress.   Good blood pressure control  Patient is normal weight with bmi of 25.6.   Weight is increased by 2.2lb since last visit of 1/5/17.     Genitourinary:   Genitourinary Comments: No abscess present, several sentinel tags in perianal area but no active hemorrhoids, no blood, no pus, no unusual odor present   Musculoskeletal:        Right " shoulder: She exhibits crepitus. She exhibits normal range of motion, no tenderness, no bony tenderness, no swelling, no effusion, no deformity and no spasm.   Neurological: She is alert and oriented to person, place, and time.   Skin: She is not diaphoretic.   Psychiatric: She has a normal mood and affect. Her behavior is normal. Judgment and thought content normal.   Nursing note and vitals reviewed.      Assessment:       1. Rotator cuff syndrome of right shoulder    2. Post-menopause    3. Abnormal body odor        Plan:       1. Rotator cuff syndrome of right shoulder  improving    2. Post-menopause  - DXA Bone Density Spine And Hip; Future    3. Abnormal body odor  Nothing found

## 2017-03-29 ENCOUNTER — HOSPITAL ENCOUNTER (OUTPATIENT)
Dept: RADIOLOGY | Facility: CLINIC | Age: 72
Discharge: HOME | End: 2017-03-29
Attending: FAMILY MEDICINE
Payer: MEDICARE

## 2017-03-29 ENCOUNTER — TELEPHONE (OUTPATIENT)
Dept: ORTHOPEDICS | Facility: CLINIC | Age: 72
End: 2017-03-29

## 2017-03-29 DIAGNOSIS — Z78.0 POST-MENOPAUSE: ICD-10-CM

## 2017-03-29 PROCEDURE — 77080 DXA BONE DENSITY AXIAL: CPT | Mod: 26,,, | Performed by: RADIOLOGY

## 2017-03-29 PROCEDURE — 77080 DXA BONE DENSITY AXIAL: CPT | Mod: TC,PO

## 2017-03-29 NOTE — TELEPHONE ENCOUNTER
----- Message from RT Merly sent at 3/29/2017  8:34 AM CDT -----  Contact: pt    pt , request a call back concerning a f/u on her hip / shoulder, thanks.

## 2017-03-30 ENCOUNTER — OFFICE VISIT (OUTPATIENT)
Dept: ORTHOPEDICS | Facility: CLINIC | Age: 72
End: 2017-03-30
Payer: MEDICARE

## 2017-03-30 ENCOUNTER — TELEPHONE (OUTPATIENT)
Dept: FAMILY MEDICINE | Facility: CLINIC | Age: 72
End: 2017-03-30

## 2017-03-30 VITALS
BODY MASS INDEX: 25.39 KG/M2 | SYSTOLIC BLOOD PRESSURE: 134 MMHG | HEIGHT: 66 IN | DIASTOLIC BLOOD PRESSURE: 63 MMHG | HEART RATE: 68 BPM | WEIGHT: 158 LBS

## 2017-03-30 DIAGNOSIS — S46.811A TRAPEZIUS STRAIN, RIGHT, INITIAL ENCOUNTER: Primary | ICD-10-CM

## 2017-03-30 DIAGNOSIS — M81.0 OSTEOPOROSIS: Primary | ICD-10-CM

## 2017-03-30 PROCEDURE — 99213 OFFICE O/P EST LOW 20 MIN: CPT | Mod: S$GLB,,, | Performed by: ORTHOPAEDIC SURGERY

## 2017-03-30 PROCEDURE — 99999 PR PBB SHADOW E&M-EST. PATIENT-LVL III: CPT | Mod: PBBFAC,,, | Performed by: ORTHOPAEDIC SURGERY

## 2017-03-30 PROCEDURE — 1157F ADVNC CARE PLAN IN RCRD: CPT | Mod: S$GLB,,, | Performed by: ORTHOPAEDIC SURGERY

## 2017-03-30 PROCEDURE — 1159F MED LIST DOCD IN RCRD: CPT | Mod: S$GLB,,, | Performed by: ORTHOPAEDIC SURGERY

## 2017-03-30 PROCEDURE — 3075F SYST BP GE 130 - 139MM HG: CPT | Mod: S$GLB,,, | Performed by: ORTHOPAEDIC SURGERY

## 2017-03-30 PROCEDURE — 3078F DIAST BP <80 MM HG: CPT | Mod: S$GLB,,, | Performed by: ORTHOPAEDIC SURGERY

## 2017-03-30 PROCEDURE — 1160F RVW MEDS BY RX/DR IN RCRD: CPT | Mod: S$GLB,,, | Performed by: ORTHOPAEDIC SURGERY

## 2017-03-30 PROCEDURE — 1125F AMNT PAIN NOTED PAIN PRSNT: CPT | Mod: S$GLB,,, | Performed by: ORTHOPAEDIC SURGERY

## 2017-03-30 RX ORDER — IBANDRONATE SODIUM 150 MG/1
150 TABLET, FILM COATED ORAL
Qty: 1 TABLET | Refills: 11 | Status: SHIPPED | OUTPATIENT
Start: 2017-03-30 | End: 2018-04-08 | Stop reason: SDUPTHER

## 2017-03-30 NOTE — TELEPHONE ENCOUNTER
----- Message from Lorena Al LPN sent at 3/30/2017  4:41 PM CDT -----  Patient informed and verbalizes full understanding. She agrees to start a medication to treat osteoporosis. Please send RX to [facility] on [facility].

## 2017-03-30 NOTE — TELEPHONE ENCOUNTER
----- Message from Savanna Krishnamurthy sent at 3/30/2017  4:46 PM CDT -----  Pt called stated that she just hung up with you and she want to asking if she can get the results for Bone density mailed to her

## 2017-03-30 NOTE — PROGRESS NOTES
Past Medical History:   Diagnosis Date    Allergy     Cataract     Colon polyp     Degenerative arthritis of knee 4/3/2012    Depression 3/26/2012    Diverticulosis     GERD (gastroesophageal reflux disease)     Hyperlipidemia     Hypertension     Multinodular goiter 3/26/2012    Myopathy, unspecified 1/18/2010    Tuberculosis        Past Surgical History:   Procedure Laterality Date    COLONOSCOPY  12/2/15    Dr. Britton, multiple polyps, recheck five years-three years if more than 2 polyps are adenomatous    COLONOSCOPY N/A 12/2/2015    FOOT SURGERY      HYSTERECTOMY      KNEE SURGERY  06/06/2013    right knee tear Dr Iverson     LUNG LOBECTOMY  1966    right middle lobectomy, due to Tb    SHOULDER SURGERY      francis        Current Outpatient Prescriptions   Medication Sig    aspirin (ECOTRIN) 81 MG EC tablet Take 162 mg by mouth once daily.     atorvastatin (LIPITOR) 40 MG tablet Take 1 tablet (40 mg total) by mouth once daily.    lisinopril (PRINIVIL,ZESTRIL) 20 MG tablet Take 1 tablet (20 mg total) by mouth once daily.    multivitamin capsule Take 1 capsule by mouth once daily. 1 Capsule(s) Oral  Every day.    povidone (SOOTHE HYDRATION) 1.25 % Drop Apply 1 drop to eye once daily.     tizanidine (ZANAFLEX) 4 MG tablet Take 4 mg by mouth 3 (three) times daily as needed.      No current facility-administered medications for this visit.        Review of patient's allergies indicates:   Allergen Reactions    No known drug allergies        Family History   Problem Relation Age of Onset    Colon cancer Other     Cancer Other     Cancer Mother     Hypertension Mother     Cancer Brother     Hypertension Father     Diabetes Son     Hypertension Son     Diabetes Maternal Aunt     Alzheimer's disease Maternal Uncle     Cancer Maternal Grandmother     No Known Problems Daughter     No Known Problems Maternal Grandfather     No Known Problems Paternal Grandmother     No Known Problems  Paternal Grandfather     Cancer Sister      breast    Dementia Sister     Alzheimer's disease Sister     Obesity Paternal Uncle     Melanoma Neg Hx     Psoriasis Neg Hx     Lupus Neg Hx     Eczema Neg Hx     Amblyopia Neg Hx     Blindness Neg Hx     Cataracts Neg Hx     Glaucoma Neg Hx     Macular degeneration Neg Hx     Retinal detachment Neg Hx     Strabismus Neg Hx     Stroke Neg Hx     Thyroid disease Neg Hx        Social History     Social History    Marital status:      Spouse name: N/A    Number of children: N/A    Years of education: N/A     Occupational History    Not on file.     Social History Main Topics    Smoking status: Former Smoker    Smokeless tobacco: Never Used      Comment: quit in 1963    Alcohol use Yes      Comment: Occasionally    Drug use: No    Sexual activity: Not Currently     Other Topics Concern    Not on file     Social History Narrative       Chief Complaint:   Chief Complaint   Patient presents with    Shoulder Pain     right shoulder pain    Hip Pain     right hip pain       History: This is a 71year-old female who have seen for some issues who now comes in with right shoulder , hip and knee pain.  Pain in the shoulders been present for years now.  Pain laying on it at night.  Unable to reach her arm across her body or above her head.  Pain as a 6 out of 10.  Up to a 9 out of 10 at night.  Also complains of right leg and knee pain.  No injury or trauma.  She's been trying to stretch it without success.  She has a history of prior scope of the right shoulder.  She has a lot of tightness up in the neck.  Patient has been getting massage and doing some aerobics.  She is taking Zanaflex.    Present:  Physical therapy does seem to be helping.  She is complaining more about tightness in her neck and pain near her SC joint.  Pain as a 2 out of 10.      Review of Systems:    Constitution: Negative for chills, fever, and sweats.  Negative for unexplained  weight loss.    HENT:  Negative for headaches and blurry vision.    Cardiovascular:Negative for chest pain or irregular heart beat. Negative for hypertension.    Respiratory:  Negative for cough and shortness of breath.    Gastrointestinal: Negative for abdominal pain, heartburn, melena, nausea, and vomitting.    Genitourinary:  Negative bladder incontinence and dysuria.    Musculoskeletal:  See HPI    Neurological: Negative for numbness.    Psychiatric/Behavioral: Negative for depression.  The patient is not nervous/anxious.      Endocrine: Negative for polyuria    Hematologic/Lymphatic: Negative for bleeding problem.  Does not bruise/bleed easily.    Skin: Negative for poor would healing and rash      Physical Examination:    Vital Signs:    Vitals:    03/30/17 0839   BP: 134/63   Pulse: 68       Body mass index is 25.5 kg/(m^2).    This a well-developed, well nourished patient in no acute distress.  They are alert and oriented and cooperative to examination.  Pt. walks without an antalgic gait.      Examination of the right shoulder shows no rashes or erythema. There are no masses, ecchymosis, or atrophy. The patient has full range of motion in forward flexion, external rotation, and internal rotation to the mid T-spine. The patient has mildly positive impingement signs.Passive range of motion: Forward flexion of 180°, external rotation at 90° of 90°, internal rotation of 50°, and external rotation at 0° of 50°. 2+ radial pulse. Intact axillary, radial, median and ulnar sensation. 5 out of 5 resisted forward flexion, external rotation, and negative lift off test.  Has some definite tightness in her trapezius.    Examination of the right knee shows no rashes or erythema. There are no masses ecchymosis or effusion. Patient has full range of motion from 0-130°. Patient is mildly tender to palpation over lateral joint line and nontender to palpation over the medial joint line. Knee is stable to varus and valgus  stress. 5 out of 5 motor strength. Palpable distal pulses. Intact light touch sensation. Negative Patellofemoral crepitus      X-rays: X-rays the right shoulder are reviewed which show no sign of acute fracture or injury  X-ray of the right hip are  reviewed which show well-preserved joint space  X-rays of the right knee are reviewed which show complete lateral joint space narrowing     Assessment:: Right rotator cuff tendinitis  Right knee arthritis     Plan: I recommended continuing physical therapy.  She might have some mild subluxation of her SC joint and some inflammation near which might be causing some of the symptoms in her neck.  I recommended continuing the Flexeril and the physical therapy.  Follow-up in 3 weeks.

## 2017-04-27 ENCOUNTER — HOSPITAL ENCOUNTER (OUTPATIENT)
Dept: RADIOLOGY | Facility: CLINIC | Age: 72
Discharge: HOME | End: 2017-04-27
Attending: OBSTETRICS & GYNECOLOGY
Payer: MEDICARE

## 2017-04-27 DIAGNOSIS — Z01.419 WELL WOMAN EXAM WITH ROUTINE GYNECOLOGICAL EXAM: ICD-10-CM

## 2017-04-27 DIAGNOSIS — Z12.31 ENCOUNTER FOR SCREENING MAMMOGRAM FOR BREAST CANCER: ICD-10-CM

## 2017-04-27 PROCEDURE — 77063 BREAST TOMOSYNTHESIS BI: CPT | Mod: 26,,, | Performed by: RADIOLOGY

## 2017-04-27 PROCEDURE — 77067 SCR MAMMO BI INCL CAD: CPT | Mod: TC

## 2017-04-27 PROCEDURE — 77067 SCR MAMMO BI INCL CAD: CPT | Mod: 26,,, | Performed by: RADIOLOGY

## 2017-05-02 ENCOUNTER — DOCUMENTATION ONLY (OUTPATIENT)
Dept: FAMILY MEDICINE | Facility: CLINIC | Age: 72
End: 2017-05-02

## 2017-05-02 NOTE — PROGRESS NOTES
Pre-Visit Chart Review  For Appointment Scheduled on 05/02/2017      There are no preventive care reminders to display for this patient.

## 2017-05-11 ENCOUNTER — OFFICE VISIT (OUTPATIENT)
Dept: ORTHOPEDICS | Facility: CLINIC | Age: 72
End: 2017-05-11
Payer: MEDICARE

## 2017-05-11 VITALS
WEIGHT: 158 LBS | BODY MASS INDEX: 25.39 KG/M2 | DIASTOLIC BLOOD PRESSURE: 71 MMHG | HEART RATE: 67 BPM | HEIGHT: 66 IN | SYSTOLIC BLOOD PRESSURE: 167 MMHG

## 2017-05-11 DIAGNOSIS — M25.551 RIGHT HIP PAIN: Primary | ICD-10-CM

## 2017-05-11 PROCEDURE — 1159F MED LIST DOCD IN RCRD: CPT | Mod: S$GLB,,, | Performed by: ORTHOPAEDIC SURGERY

## 2017-05-11 PROCEDURE — 99999 PR PBB SHADOW E&M-EST. PATIENT-LVL III: CPT | Mod: PBBFAC,,, | Performed by: ORTHOPAEDIC SURGERY

## 2017-05-11 PROCEDURE — 1125F AMNT PAIN NOTED PAIN PRSNT: CPT | Mod: S$GLB,,, | Performed by: ORTHOPAEDIC SURGERY

## 2017-05-11 PROCEDURE — 3077F SYST BP >= 140 MM HG: CPT | Mod: S$GLB,,, | Performed by: ORTHOPAEDIC SURGERY

## 2017-05-11 PROCEDURE — 3078F DIAST BP <80 MM HG: CPT | Mod: S$GLB,,, | Performed by: ORTHOPAEDIC SURGERY

## 2017-05-11 PROCEDURE — 99213 OFFICE O/P EST LOW 20 MIN: CPT | Mod: S$GLB,,, | Performed by: ORTHOPAEDIC SURGERY

## 2017-05-11 PROCEDURE — 1160F RVW MEDS BY RX/DR IN RCRD: CPT | Mod: S$GLB,,, | Performed by: ORTHOPAEDIC SURGERY

## 2017-05-11 NOTE — PROGRESS NOTES
Past Medical History:   Diagnosis Date    Allergy     Cataract     Colon polyp     Degenerative arthritis of knee 4/3/2012    Depression 3/26/2012    Diverticulosis     GERD (gastroesophageal reflux disease)     Hyperlipidemia     Hypertension     Multinodular goiter 3/26/2012    Myopathy, unspecified 1/18/2010    Tuberculosis        Past Surgical History:   Procedure Laterality Date    COLONOSCOPY  12/2/15    Dr. Britton, multiple polyps, recheck five years-three years if more than 2 polyps are adenomatous    COLONOSCOPY N/A 12/2/2015    FOOT SURGERY      HYSTERECTOMY      KNEE SURGERY  06/06/2013    right knee tear Dr Iverson     LUNG LOBECTOMY  1966    right middle lobectomy, due to Tb    SHOULDER SURGERY      francis        Current Outpatient Prescriptions   Medication Sig    aspirin (ECOTRIN) 81 MG EC tablet Take 162 mg by mouth once daily.     atorvastatin (LIPITOR) 40 MG tablet Take 1 tablet (40 mg total) by mouth once daily.    ibandronate (BONIVA) 150 mg tablet Take 1 tablet (150 mg total) by mouth every 30 days.    lisinopril (PRINIVIL,ZESTRIL) 20 MG tablet Take 1 tablet (20 mg total) by mouth once daily.    multivitamin capsule Take 1 capsule by mouth once daily. 1 Capsule(s) Oral  Every day.    povidone (SOOTHE HYDRATION) 1.25 % Drop Apply 1 drop to eye once daily.     tizanidine (ZANAFLEX) 4 MG tablet Take 4 mg by mouth 3 (three) times daily as needed.      No current facility-administered medications for this visit.        Review of patient's allergies indicates:   Allergen Reactions    No known drug allergies        Family History   Problem Relation Age of Onset    Colon cancer Other     Cancer Other     Cancer Mother     Hypertension Mother     Cancer Brother     Hypertension Father     Diabetes Son     Hypertension Son     Diabetes Maternal Aunt     Alzheimer's disease Maternal Uncle     Cancer Maternal Grandmother     No Known Problems Daughter     No Known  Problems Maternal Grandfather     No Known Problems Paternal Grandmother     No Known Problems Paternal Grandfather     Cancer Sister      breast    Dementia Sister     Alzheimer's disease Sister     Obesity Paternal Uncle     Melanoma Neg Hx     Psoriasis Neg Hx     Lupus Neg Hx     Eczema Neg Hx     Amblyopia Neg Hx     Blindness Neg Hx     Cataracts Neg Hx     Glaucoma Neg Hx     Macular degeneration Neg Hx     Retinal detachment Neg Hx     Strabismus Neg Hx     Stroke Neg Hx     Thyroid disease Neg Hx        Social History     Social History    Marital status:      Spouse name: N/A    Number of children: N/A    Years of education: N/A     Occupational History    Not on file.     Social History Main Topics    Smoking status: Former Smoker    Smokeless tobacco: Never Used      Comment: quit in 1963    Alcohol use Yes      Comment: Occasionally    Drug use: No    Sexual activity: Not Currently     Other Topics Concern    Not on file     Social History Narrative       Chief Complaint:   Chief Complaint   Patient presents with    Shoulder Pain     RIGHT SHOULDER PAIN       History: This is a 71 year-old female who have seen for some issues who now comes in with right shoulder , hip and knee pain.  Pain in the shoulders been present for years now.  Pain laying on it at night.  Unable to reach her arm across her body or above her head.  Pain as a 6 out of 10.  Up to a 9 out of 10 at night.  Also complains of right leg and knee pain.  No injury or trauma.  She's been trying to stretch it without success.  She has a history of prior scope of the right shoulder.  She has a lot of tightness up in the neck.  Patient has been getting massage and doing some aerobics.  She is taking Zanaflex.    Present:  Physical therapy does seem to be helping.  She is complaining more about pain more in her hip.  Feels somewhat better times but having some burning and other issues.  Pain as a 3 out of  10.    Review of Systems:    Constitution: Negative for chills, fever, and sweats.  Negative for unexplained weight loss.    HENT:  Negative for headaches and blurry vision.    Cardiovascular:Negative for chest pain or irregular heart beat. Negative for hypertension.    Respiratory:  Negative for cough and shortness of breath.    Gastrointestinal: Negative for abdominal pain, heartburn, melena, nausea, and vomitting.    Genitourinary:  Negative bladder incontinence and dysuria.    Musculoskeletal:  See HPI    Neurological: Negative for numbness.    Psychiatric/Behavioral: Negative for depression.  The patient is not nervous/anxious.      Endocrine: Negative for polyuria    Hematologic/Lymphatic: Negative for bleeding problem.  Does not bruise/bleed easily.    Skin: Negative for poor would healing and rash      Physical Examination:    Vital Signs:    Vitals:    05/11/17 0927   BP: (!) 167/71   Pulse: 67       Body mass index is 25.5 kg/(m^2).    This a well-developed, well nourished patient in no acute distress.  They are alert and oriented and cooperative to examination.  Pt. walks without an antalgic gait.      Examination of the right shoulder shows no rashes or erythema. There are no masses, ecchymosis, or atrophy. The patient has full range of motion in forward flexion, external rotation, and internal rotation to the mid T-spine. The patient has mildly positive impingement signs.Passive range of motion: Forward flexion of 180°, external rotation at 90° of 90°, internal rotation of 50°, and external rotation at 0° of 50°. 2+ radial pulse. Intact axillary, radial, median and ulnar sensation. 5 out of 5 resisted forward flexion, external rotation, and negative lift off test.  Has some definite tightness in her trapezius.    Examination of the patient's right hip shows full range of motion with flexion to 160°, extension to 0, external rotation to 50°, internal rotation of 15°, abduction of 50°, adduction of 15°.  Skin has no rashes or bruising. Patient has mildly tender Stinchfield exam. Patient has negative straight leg raise.Negative internal impingement test. Negative JOSE test. Negative Greta's test. Patient has no pain with hip range of motion.  Mildly tender to palpation over the greater trochanteric bursa. Patient is 5 out of 5 motor strength, palpable distal pulses, and intact light touch sensation.       X-rays: X-rays the right shoulder are reviewed which show no sign of acute fracture or injury  X-ray of the right hip are  reviewed which show well-preserved joint space  X-rays of the right knee are reviewed which show complete lateral joint space narrowing     Assessment:: Possible lumbar spine or SI joint component    Plan: I recommended continuing physical therapy.  I recommended consultation with Dr. Chiang for possible SI joint or piriformis injection

## 2017-05-12 ENCOUNTER — TELEPHONE (OUTPATIENT)
Dept: FAMILY MEDICINE | Facility: CLINIC | Age: 72
End: 2017-05-12

## 2017-05-12 NOTE — TELEPHONE ENCOUNTER
Has noticed rectal odor for 6 wks- rectum checked by Dr. Myers- has not seen blood after bm- no vaginal discharge or inflammation- showering does not help- advised to ask Dr. Britton about this when she see's him 5/24/17.

## 2017-05-12 NOTE — TELEPHONE ENCOUNTER
----- Message from Ethan JACKSON Frisard sent at 5/11/2017  4:15 PM CDT -----  Contact: same  Patient called in and stated that she is having an order coming from her rectum, no type of discharge and its not gas.    Patient stated she would see a NP but would like to see someone tomorrow Friday 5/12/17.    Patient call back number is [phone])

## 2017-05-15 ENCOUNTER — TELEPHONE (OUTPATIENT)
Dept: PAIN MEDICINE | Facility: CLINIC | Age: 72
End: 2017-05-15

## 2017-05-15 NOTE — TELEPHONE ENCOUNTER
----- Message from Lucia Wynne LPN sent at 5/12/2017 11:09 AM CDT -----  Pt being referred to Dr. Chiang by Dr. Iverson for SI joint pain. Please call pt to schedule appointment. Thanks!

## 2017-05-17 ENCOUNTER — TELEPHONE (OUTPATIENT)
Dept: ORTHOPEDICS | Facility: CLINIC | Age: 72
End: 2017-05-17

## 2017-05-17 NOTE — TELEPHONE ENCOUNTER
----- Message from Radha Shearer sent at 5/17/2017  9:34 AM CDT -----  Contact: self  Patient needs a new referral to Dr Chiang. Please call patient at [phone] or [phone]. Thanks!

## 2017-05-17 NOTE — TELEPHONE ENCOUNTER
Called and left message for patient to return call. New referral placed in the system per request.

## 2017-05-22 ENCOUNTER — TELEPHONE (OUTPATIENT)
Dept: CARDIOLOGY | Facility: CLINIC | Age: 72
End: 2017-05-22

## 2017-05-22 NOTE — TELEPHONE ENCOUNTER
Returned pts call. Scheduled pt for first available and placed on wait list. Pt verbalized OK with date and time. No further issues.

## 2017-05-22 NOTE — TELEPHONE ENCOUNTER
----- Message from Berenice Myers sent at 5/22/2017 10:25 AM CDT -----  Contact: Erica  Patient called to scheduled annual visit based on recall letter due 6/30/17 but first available able to schedule is 10/24/17. Please call [phone] or [phone]. Thanks!

## 2017-05-24 ENCOUNTER — OFFICE VISIT (OUTPATIENT)
Dept: GASTROENTEROLOGY | Facility: CLINIC | Age: 72
End: 2017-05-24
Payer: MEDICARE

## 2017-05-24 VITALS
WEIGHT: 159.81 LBS | HEIGHT: 66 IN | HEART RATE: 69 BPM | DIASTOLIC BLOOD PRESSURE: 60 MMHG | BODY MASS INDEX: 25.68 KG/M2 | SYSTOLIC BLOOD PRESSURE: 124 MMHG

## 2017-05-24 DIAGNOSIS — K62.9 RECTAL ABNORMALITY: ICD-10-CM

## 2017-05-24 DIAGNOSIS — K59.09 CHRONIC CONSTIPATION: ICD-10-CM

## 2017-05-24 DIAGNOSIS — D12.6 ADENOMATOUS POLYP OF COLON, UNSPECIFIED PART OF COLON: Primary | ICD-10-CM

## 2017-05-24 PROCEDURE — 3078F DIAST BP <80 MM HG: CPT | Mod: S$GLB,,, | Performed by: INTERNAL MEDICINE

## 2017-05-24 PROCEDURE — 99999 PR PBB SHADOW E&M-EST. PATIENT-LVL II: CPT | Mod: PBBFAC,,, | Performed by: INTERNAL MEDICINE

## 2017-05-24 PROCEDURE — 1157F ADVNC CARE PLAN IN RCRD: CPT | Mod: 8P,S$GLB,, | Performed by: INTERNAL MEDICINE

## 2017-05-24 PROCEDURE — 1159F MED LIST DOCD IN RCRD: CPT | Mod: S$GLB,,, | Performed by: INTERNAL MEDICINE

## 2017-05-24 PROCEDURE — 3074F SYST BP LT 130 MM HG: CPT | Mod: S$GLB,,, | Performed by: INTERNAL MEDICINE

## 2017-05-24 PROCEDURE — 99214 OFFICE O/P EST MOD 30 MIN: CPT | Mod: S$GLB,,, | Performed by: INTERNAL MEDICINE

## 2017-05-24 PROCEDURE — 1160F RVW MEDS BY RX/DR IN RCRD: CPT | Mod: S$GLB,,, | Performed by: INTERNAL MEDICINE

## 2017-05-24 NOTE — PROGRESS NOTES
"Subjective:       Patient ID: Erica Masterson is a 71 y.o. female.    This is an established patient.      Chief Complaint: Constipation (rectal odor )    Patient seen for constipation, onset remote, with stool frequency every couple of days, moderate in severity, with stool form being hard and small like daryl.  Associated signs and symptoms include straining and strange "acidic rectal odor" and alleviating/exacerbating factors include none.   Last colonoscopy was in 2015 with adenomatous polyps noted.  She denies bleeding, weight loss, or change in bowel habits from her baseline constipation.  She has tried bisacodyl with some relief.  She denies any other complaints currently.  She states that if she does not take bisacodyl, she will have a few days in between BMs.          Review of Systems   Constitutional: Negative for chills, fatigue and fever.   HENT: Negative for sore throat and trouble swallowing.    Respiratory: Negative for cough, shortness of breath and wheezing.    Cardiovascular: Negative for chest pain and palpitations.   Gastrointestinal: Positive for constipation. Negative for abdominal pain, blood in stool, nausea and vomiting.        + rectal odor (unusual)   Genitourinary: Negative for dysuria and hematuria.   Musculoskeletal: Negative for arthralgias and myalgias.   Skin: Negative for color change and rash.   Neurological: Negative for dizziness and headaches.   Hematological: Negative for adenopathy.   Psychiatric/Behavioral: Negative for confusion. The patient is not nervous/anxious.    All other systems reviewed and are negative.      Objective:         Vitals:    05/24/17 1405   BP: 124/60   Pulse: 69   Weight: 72.5 kg (159 lb 13.3 oz)   Height: 5' 6" (1.676 m)       Physical Exam   Constitutional: She is oriented to person, place, and time. She appears well-developed and well-nourished.   HENT:   Head: Normocephalic and atraumatic.   Eyes: Pupils are equal, round, and reactive to light. " No scleral icterus.   Cardiovascular: Normal rate and regular rhythm.    No murmur heard.  Pulmonary/Chest: Effort normal and breath sounds normal. She has no wheezes.   Abdominal: Soft. Bowel sounds are normal. She exhibits no distension. There is no tenderness.   Neurological: She is alert and oriented to person, place, and time.         Lab Results   Component Value Date    WBC 6.30 06/12/2016    HGB 12.2 06/12/2016    HCT 36.9 (L) 06/12/2016    MCV 89 06/12/2016     06/12/2016       CMP  Sodium   Date Value Ref Range Status   01/05/2017 142 136 - 145 mmol/L Final     Potassium   Date Value Ref Range Status   01/05/2017 4.5 3.5 - 5.1 mmol/L Final     Chloride   Date Value Ref Range Status   01/05/2017 107 95 - 110 mmol/L Final     CO2   Date Value Ref Range Status   01/05/2017 28 23 - 29 mmol/L Final     Glucose   Date Value Ref Range Status   01/05/2017 94 70 - 110 mg/dL Final     BUN, Bld   Date Value Ref Range Status   01/05/2017 21 8 - 23 mg/dL Final     Creatinine   Date Value Ref Range Status   01/05/2017 0.9 0.5 - 1.4 mg/dL Final   06/04/2013 0.9 0.5 - 1.4 mg/dL Final     Calcium   Date Value Ref Range Status   01/05/2017 9.4 8.7 - 10.5 mg/dL Final   06/04/2013 9.7 8.7 - 10.5 mg/dL Final     Total Protein   Date Value Ref Range Status   06/12/2016 7.5 6.0 - 8.4 g/dL Final     Albumin   Date Value Ref Range Status   06/12/2016 4.1 3.5 - 5.2 g/dL Final     Total Bilirubin   Date Value Ref Range Status   06/12/2016 0.5 0.1 - 1.0 mg/dL Final     Comment:     For infants and newborns, interpretation of results should be based  on gestational age, weight and in agreement with clinical  observations.  Premature Infant recommended reference ranges:  Up to 24 hours.............<8.0 mg/dL  Up to 48 hours............<12.0 mg/dL  3-5 days..................<15.0 mg/dL  6-29 days.................<15.0 mg/dL       Alkaline Phosphatase   Date Value Ref Range Status   06/12/2016 58 55 - 135 U/L Final     AST   Date  Value Ref Range Status   06/12/2016 19 10 - 40 U/L Final     ALT   Date Value Ref Range Status   06/12/2016 20 10 - 44 U/L Final     Anion Gap   Date Value Ref Range Status   01/05/2017 7 (L) 8 - 16 mmol/L Final   06/04/2013 9 5 - 15 meq/L Final     eGFR if    Date Value Ref Range Status   01/05/2017 >60.0 >60 mL/min/1.73 m^2 Final     eGFR if non    Date Value Ref Range Status   01/05/2017 >60.0 >60 mL/min/1.73 m^2 Final     Comment:     Calculation used to obtain the estimated glomerular filtration  rate (eGFR) is the CKD-EPI equation. Since race is unknown   in our information system, the eGFR values for   -American and Non--American patients are given   for each creatinine result.         Colonoscopy and biopsies reviewed.    Assessment:       1. Adenomatous polyp of colon, unspecified part of colon    2. Chronic constipation    3. Rectal abnormality        Plan:       1.  Recommend probiotics  2.  Recommend daily exercise, adequate water intake, and high fiber diet.  Recommend daily miralax (17g PO once or twice daily) with intermittently dosed dulcolax (every 2-3 days) to facilitate bowel movements.  If no relief with this, consider adding emollient laxative (castor oil or mineral oil) +/- enema.  3.  Repeat colonoscopy in 2018.    4.  Further recommendations to follow after above.  5.  Follow up in my office PRN.

## 2017-06-01 ENCOUNTER — OFFICE VISIT (OUTPATIENT)
Dept: PAIN MEDICINE | Facility: CLINIC | Age: 72
End: 2017-06-01
Payer: MEDICARE

## 2017-06-01 VITALS
BODY MASS INDEX: 25.68 KG/M2 | DIASTOLIC BLOOD PRESSURE: 76 MMHG | SYSTOLIC BLOOD PRESSURE: 122 MMHG | WEIGHT: 159.81 LBS | HEIGHT: 66 IN | HEART RATE: 68 BPM

## 2017-06-01 DIAGNOSIS — M53.3 SACROILIAC JOINT PAIN: ICD-10-CM

## 2017-06-01 DIAGNOSIS — M79.18 RIGHT BUTTOCK PAIN: Primary | ICD-10-CM

## 2017-06-01 PROCEDURE — 1125F AMNT PAIN NOTED PAIN PRSNT: CPT | Mod: S$GLB,,, | Performed by: ANESTHESIOLOGY

## 2017-06-01 PROCEDURE — 99204 OFFICE O/P NEW MOD 45 MIN: CPT | Mod: S$GLB,,, | Performed by: ANESTHESIOLOGY

## 2017-06-01 PROCEDURE — 1159F MED LIST DOCD IN RCRD: CPT | Mod: S$GLB,,, | Performed by: ANESTHESIOLOGY

## 2017-06-01 PROCEDURE — 99999 PR PBB SHADOW E&M-EST. PATIENT-LVL III: CPT | Mod: PBBFAC,,, | Performed by: ANESTHESIOLOGY

## 2017-06-01 NOTE — PROGRESS NOTES
This note was completed with dictation software and grammatical errors may exist.    Referring Physician: Adriel Iverson,*    PCP: Narayan Myers MD      CC: Right buttock pain    HPI:   Erica Masterson is a 71 y.o. female referred to us for right buttock pain.  Pain has been present for the past 6 months.  No recent traumatic incident.  She has constant burning, tight, deep pain in her right buttock.  Pain radiates to her right lateral thigh as well as her right groin.  Pain worsens with sitting, laying, walking, getting up.  Pain improves with rest.  She is undergoing physical therapy with mild benefits.  She denies any leg weakness.  No bowel bladder changes.  X-rays of her hips were normal.  She had some mild osteoarthritis of the lumbar and SI joints.  She rates her pain 4/10 today, 6/10 at worse.    ROS:  CONSTITUTIONAL: No fevers, chills, night sweats, wt. loss, appetite changes  SKIN: no rashes or itching  ENT: No headaches, head trauma, vision changes, or eye pain  LYMPH NODES: None noticed   CV: No chest pain, palpitations.   RESP: No shortness of breath, dyspnea on exertion, cough, wheezing, or hemoptysis  GI: No nausea, emesis, diarrhea, constipation, melena, hematochezia, pain.    : No dysuria, hematuria, urgency, or frequency   HEME: No easy bruising, bleeding problems  PSYCHIATRIC: No depression, anxiety, psychosis, hallucinations.  NEURO: No seizures, memory loss, dizziness or difficulty sleeping  MSK: + History of present illness      Past Medical History:   Diagnosis Date    Allergy     Cataract     Colon polyp     Degenerative arthritis of knee 4/3/2012    Depression 3/26/2012    Diverticulosis     GERD (gastroesophageal reflux disease)     Hyperlipidemia     Hypertension     Multinodular goiter 3/26/2012    Myopathy, unspecified 1/18/2010    Tuberculosis      Past Surgical History:   Procedure Laterality Date    COLONOSCOPY  12/2/15    Dr. Britton, multiple polyps, recheck  "five years-three years if more than 2 polyps are adenomatous    COLONOSCOPY N/A 12/2/2015    FOOT SURGERY      HYSTERECTOMY      KNEE SURGERY  06/06/2013    right knee tear Dr Iverson     LUNG LOBECTOMY  1966    right middle lobectomy, due to Tb    SHOULDER SURGERY      francis      Family History   Problem Relation Age of Onset    Colon cancer Other     Cancer Other     Cancer Mother     Hypertension Mother     Cancer Brother     Hypertension Father     Diabetes Son     Hypertension Son     Diabetes Maternal Aunt     Alzheimer's disease Maternal Uncle     Cancer Maternal Grandmother     No Known Problems Daughter     No Known Problems Maternal Grandfather     No Known Problems Paternal Grandmother     No Known Problems Paternal Grandfather     Cancer Sister      breast    Dementia Sister     Alzheimer's disease Sister     Obesity Paternal Uncle     Melanoma Neg Hx     Psoriasis Neg Hx     Lupus Neg Hx     Eczema Neg Hx     Amblyopia Neg Hx     Blindness Neg Hx     Cataracts Neg Hx     Glaucoma Neg Hx     Macular degeneration Neg Hx     Retinal detachment Neg Hx     Strabismus Neg Hx     Stroke Neg Hx     Thyroid disease Neg Hx      Social History     Social History    Marital status:      Spouse name: N/A    Number of children: N/A    Years of education: N/A     Social History Main Topics    Smoking status: Former Smoker    Smokeless tobacco: Never Used      Comment: quit in 1963    Alcohol use Yes      Comment: Occasionally    Drug use: No    Sexual activity: Not Currently     Other Topics Concern    None     Social History Narrative    None         Medications/Allergies: See med card    Vitals:    06/01/17 0954   BP: 122/76   Pulse: 68   Weight: 72.5 kg (159 lb 13.3 oz)   Height: 5' 6" (1.676 m)   PainSc:   4         Physical exam:    GENERAL: A and O x3, the patient appears well groomed and is in no acute distress.  Skin: No rashes or obvious lesions  HEENT: " normocephalic, atraumatic  CARDIOVASCULAR:  Palpable peripheral pulses  LUNGS: easy work of breathing  ABDOMEN: soft, nontender   UPPER EXTREMITIES: Normal alignment, normal range of motion, no atrophy, no skin changes,  hair growth and nail growth normal and equal bilaterally. No swelling, no tenderness.    LOWER EXTREMITIES:  Normal alignment, normal range of motion, no atrophy, no skin changes,  hair growth and nail growth normal and equal bilaterally. No swelling, no tenderness.    LUMBAR SPINE  Lumbar spine: ROM is full with flexion extension and oblique extension with mild increased pain.    Nadeem's test causes increased pain on right side.    Supine straight leg raise is negative bilaterally.    Internal and external rotation of the hip causes no increased pain on either side.  Myofascial exam: No tenderness to palpation across lumbar paraspinous muscles.      MENTAL STATUS: normal orientation, speech, language, and fund of knowledge for social situation.  Emotional state appropriate.    CRANIAL NERVES:  II:  PERRL bilaterally,   III,IV,VI: EOMI.    V:  Facial sensation equal bilaterally  VII:  Facial motor function normal.  VIII:  Hearing equal to finger rub bilaterally  IX/X: Gag normal, palate symmetric  XI:  Shoulder shrug equal, head turn equal  XII:  Tongue midline without fasciculations      MOTOR: Tone and bulk: normal bilateral upper and lower Strength: normal   Delt Bi Tri WE WF     R 5 5 5 5 5 5   L 5 5 5 5 5 5     IP ADD ABD Quad TA Gas HAM  R 5 5 5 5 5 5 5  L 5 5 5 5 5 5 5    SENSATION: Light touch and pinprick intact bilaterally  REFLEXES: normal, symmetric, nonbrisk.  Toes down, no clonus. No hoffmans.  GAIT: normal rise, base, steps, and arm swing.        Imaging:  Xray L-spine 10/2016  views lumbar spine demonstrate moderate mid and lower lumbar degenerative disc and facet disease without evidence of fracture or subluxation.     Xray Hips 3/2017  No acute abnormality is seen.  Mild,  symmetric bilateral SI joint osteoarthritis.    Assessment:  Patient referred for right buttock pain  1. Right buttock pain    2. Sacroiliac joint pain          Plan:  - I have stressed the importance of physical activity and exercise to improve overall health.  Continue PT as ordered  - Schedule a right SI injection under fluoroscopy as both a diagnostic/therapeutic procedure right leg pain.  - May consider lumbar MBB if above is not helpful.  May also consider right hip injection if groin pain worsens  - Follow up after procedure      Thank you for referring this interesting patient, and I look forward to continuing to collaborate in her care.

## 2017-06-02 ENCOUNTER — TELEPHONE (OUTPATIENT)
Dept: FAMILY MEDICINE | Facility: CLINIC | Age: 72
End: 2017-06-02

## 2017-06-02 NOTE — TELEPHONE ENCOUNTER
Pt asked if she would have a IV for procedure. She was informed it is optional and she can choose medication by mouth or IV.

## 2017-06-02 NOTE — TELEPHONE ENCOUNTER
----- Message from Ethan Metcalf sent at 6/2/2017  9:57 AM CDT -----  Contact: same  Patient called in and requested a message be sent regarding her procedure that is schedule in about 3 weeks for pain management.    Patient call back number is [phone]

## 2017-06-02 NOTE — TELEPHONE ENCOUNTER
Spoke to patient about SI injection procedure and benefits of having one. Patient was reassured that they do help.

## 2017-06-02 NOTE — TELEPHONE ENCOUNTER
----- Message from Jean Paul Pérez sent at 6/2/2017  8:46 AM CDT -----  Contact: Erica  Patient is having an injection on 06/20. She would like to speak to him and get some opinions. She states she woke up this morning and has no pain. She would like to speak to him preferably, but will speak to nurse. Call back [phone] (home)  Or [phone]. Thanks!

## 2017-06-05 ENCOUNTER — OFFICE VISIT (OUTPATIENT)
Dept: OPHTHALMOLOGY | Facility: CLINIC | Age: 72
End: 2017-06-05
Payer: MEDICARE

## 2017-06-05 ENCOUNTER — CLINICAL SUPPORT (OUTPATIENT)
Dept: OPHTHALMOLOGY | Facility: CLINIC | Age: 72
End: 2017-06-05
Payer: MEDICARE

## 2017-06-05 DIAGNOSIS — H26.9 CORTICAL CATARACT OF BOTH EYES: ICD-10-CM

## 2017-06-05 DIAGNOSIS — H40.003 GLAUCOMA SUSPECT, BILATERAL: ICD-10-CM

## 2017-06-05 DIAGNOSIS — H52.203 HYPEROPIA WITH ASTIGMATISM AND PRESBYOPIA, BILATERAL: ICD-10-CM

## 2017-06-05 DIAGNOSIS — H52.03 HYPEROPIA WITH ASTIGMATISM AND PRESBYOPIA, BILATERAL: ICD-10-CM

## 2017-06-05 DIAGNOSIS — H52.4 HYPEROPIA WITH ASTIGMATISM AND PRESBYOPIA, BILATERAL: ICD-10-CM

## 2017-06-05 DIAGNOSIS — H25.13 NUCLEAR SCLEROSIS, BILATERAL: Primary | ICD-10-CM

## 2017-06-05 PROCEDURE — 92020 GONIOSCOPY: CPT | Mod: S$GLB,,, | Performed by: OPHTHALMOLOGY

## 2017-06-05 PROCEDURE — 92012 INTRM OPH EXAM EST PATIENT: CPT | Mod: S$GLB,,, | Performed by: OPHTHALMOLOGY

## 2017-06-05 PROCEDURE — 99499 UNLISTED E&M SERVICE: CPT | Mod: S$PBB,,, | Performed by: OPHTHALMOLOGY

## 2017-06-05 PROCEDURE — 99999 PR PBB SHADOW E&M-EST. PATIENT-LVL III: CPT | Mod: PBBFAC,,, | Performed by: OPHTHALMOLOGY

## 2017-06-05 PROCEDURE — 92083 EXTENDED VISUAL FIELD XM: CPT | Mod: S$GLB,,, | Performed by: OPHTHALMOLOGY

## 2017-06-05 NOTE — PROGRESS NOTES
"HPI     Glaucoma    Additional comments: 4 motnh iop ch with HVF reveiw//             Comments   4 motnh iop ch with HVF reveiw//      Agree with above. States she was fixating on target light on HVF as   instructed, but was seeing lights at the bottom in addition to those at   the top? Test shows significant fixation losses, especially OS. Will need   to be repeated.        Last edited by Ivory Munoz MD on 6/5/2017 10:30 AM.   (History)            Assessment /Plan     For exam results, see Encounter Report.    Nuclear sclerosis, bilateral    Glaucoma suspect, bilateral  -     Young Visual Field - OU - Extended - Both Eyes  -     Young Visual Field - OU - Extended - Both Eyes; Future  -     Posterior Segment OCT Optic Nerve- Both eyes; Future    Cortical cataract of both eyes    Hyperopia with astigmatism and presbyopia, bilateral            Glaucoma suspect, bilateral - C:D ratio appears increased from others documented in epic. Pt was followed before for glaucoma suspect by Dr. Stoddard, testing done back in 2006. IOP WNL (20 OU today). No known family history. Last HRT  suggests some RNFL thinning OD. HVF today - unreliable, high fixation losses OS>>OD. Gonio open to TM/thin SS OU. RTC for repeat HVF (DO OS FIRST) with IOP check and OCT nerve.     Nuclear sclerosis, bilateral - BAT may be visually significant OS, but not OD. Do not recommend cataract surgery at this time, as BCVA is good, and to do surgery on only one eye would leave her with anisometropia. Current spectacle lenses very scratched. Recommend updating spectacles, observe. Dilates only to ~4.5 mm despite denies flomax.     Cortical cataract of both eyes - "    Hyperopia with astigmatism and presbyopia, bilateral - MRx given                       "

## 2017-06-09 ENCOUNTER — TELEPHONE (OUTPATIENT)
Dept: PAIN MEDICINE | Facility: CLINIC | Age: 72
End: 2017-06-09

## 2017-06-09 NOTE — TELEPHONE ENCOUNTER
----- Message from Ofe Samano sent at 6/9/2017 12:19 PM CDT -----  Patient needs to speak with nurse concerning scheduled procedure/please call back at [phone] or [phone] (cellphone)

## 2017-06-19 ENCOUNTER — DOCUMENTATION ONLY (OUTPATIENT)
Dept: FAMILY MEDICINE | Facility: CLINIC | Age: 72
End: 2017-06-19

## 2017-06-19 ENCOUNTER — OFFICE VISIT (OUTPATIENT)
Dept: FAMILY MEDICINE | Facility: CLINIC | Age: 72
End: 2017-06-19
Payer: MEDICARE

## 2017-06-19 VITALS
HEIGHT: 66 IN | DIASTOLIC BLOOD PRESSURE: 56 MMHG | SYSTOLIC BLOOD PRESSURE: 148 MMHG | HEART RATE: 72 BPM | BODY MASS INDEX: 25.94 KG/M2 | OXYGEN SATURATION: 99 % | TEMPERATURE: 98 F | RESPIRATION RATE: 24 BRPM | WEIGHT: 161.38 LBS

## 2017-06-19 DIAGNOSIS — K62.89 PAIN OF PERIANAL AREA: Primary | ICD-10-CM

## 2017-06-19 PROCEDURE — 1159F MED LIST DOCD IN RCRD: CPT | Mod: S$GLB,,, | Performed by: FAMILY MEDICINE

## 2017-06-19 PROCEDURE — 99999 PR PBB SHADOW E&M-EST. PATIENT-LVL IV: CPT | Mod: PBBFAC,,, | Performed by: FAMILY MEDICINE

## 2017-06-19 PROCEDURE — 99213 OFFICE O/P EST LOW 20 MIN: CPT | Mod: S$GLB,,, | Performed by: FAMILY MEDICINE

## 2017-06-19 PROCEDURE — 1125F AMNT PAIN NOTED PAIN PRSNT: CPT | Mod: S$GLB,,, | Performed by: FAMILY MEDICINE

## 2017-06-19 NOTE — PROGRESS NOTES
Subjective:       Patient ID: Erica Masterson is a 71 y.o. female.    Chief Complaint: Rectal Pain    71-year-old female presents with pain in the right pelvic area.  She was having some problems with constipation and resolved that with stool softeners but was doing a digital exam on herself and found pain and tenderness in the right and posterior anal walls.  She also complains of an unusual odor with defecation.  She was concerned about sciatica.  She has been seeing Dr. Yang for a hip problem and has completed a course of physical therapy.    Past Medical History:  No date: Allergy  No date: Cataract  No date: Colon polyp  4/3/2012: Degenerative arthritis of knee  3/26/2012: Depression  No date: Diverticulosis  No date: GERD (gastroesophageal reflux disease)  No date: Hyperlipidemia  No date: Hypertension  3/26/2012: Multinodular goiter  1/18/2010: Myopathy, unspecified  No date: Tuberculosis    Past Surgical History:  12/2/15: COLONOSCOPY      Comment: Dr. Britton, multiple polyps, recheck five                years-three years if more than 2 polyps are                adenomatous  12/2/2015: COLONOSCOPY N/A  No date: FOOT SURGERY  No date: HYSTERECTOMY  06/06/2013: KNEE SURGERY      Comment: right knee tear Dr Iverson   1966: LUNG LOBECTOMY      Comment: right middle lobectomy, due to Tb  No date: SHOULDER SURGERY      Comment: francis       Current Outpatient Prescriptions:     aspirin (ECOTRIN) 81 MG EC tablet, Take 162 mg by mouth once daily. , Disp: , Rfl:     atorvastatin (LIPITOR) 40 MG tablet, Take 1 tablet (40 mg total) by mouth once daily., Disp: 90 tablet, Rfl: 2    ibandronate (BONIVA) 150 mg tablet, Take 1 tablet (150 mg total) by mouth every 30 days., Disp: 1 tablet, Rfl: 11    lisinopril (PRINIVIL,ZESTRIL) 20 MG tablet, Take 1 tablet (20 mg total) by mouth once daily., Disp: 90 tablet, Rfl: 3    multivitamin capsule, Take 1 capsule by mouth once daily. 1 Capsule(s) Oral  Every day., Disp: , Rfl:      povidone (SOOTHE HYDRATION) 1.25 % Drop, Apply 1 drop to eye once daily. , Disp: , Rfl:     tizanidine (ZANAFLEX) 4 MG tablet, Take 4 mg by mouth 3 (three) times daily as needed. , Disp: , Rfl:     There are no preventive care reminders to display for this patient.        Review of Systems   Gastrointestinal: Positive for rectal pain. Negative for anal bleeding and blood in stool.   Genitourinary: Positive for pelvic pain. Negative for dysuria, frequency, hematuria, urgency, vaginal bleeding, vaginal discharge and vaginal pain.       Objective:      Physical Exam   Constitutional:   Good blood pressure control  Patient is normal weight with bmi of 26.1.   Weight is increased by 3.1lb since last visit of 3/21/17.     Genitourinary: Rectal exam shows tenderness (right side wall and posteriorly, appears to be pubococcygeus area). Rectal exam shows no external hemorrhoid, no internal hemorrhoid, no fissure, no mass, anal tone normal and guaiac negative stool. No labial fusion. There is no rash, tenderness, lesion or injury on the right labia. There is no rash, tenderness, lesion or injury on the left labia.   Nursing note and vitals reviewed.      Assessment:       1. Pain of perianal area        Plan:       1. Pain of perianal area  Appears musculoligamentous on exam, possible diverticular abscess out of reach?  - CT Pelvis With Contrast; Future

## 2017-06-19 NOTE — PROGRESS NOTES
Pre-Visit Chart Review  For Appointment Scheduled on 6-19-17    There are no preventive care reminders to display for this patient.

## 2017-06-19 NOTE — PATIENT INSTRUCTIONS
Controlling High Blood Pressure  High blood pressure (hypertension) is often called the silent killer. This is because many people who have it dont know it. High blood pressure is defined as 140/90 mm Hg or higher. Know your blood pressure and remember to check it regularly. Doing so can save your life. Here are some things you can do to help control your blood pressure.    Choose heart-healthy foods  · Select low-salt, low-fat foods. Limit sodium intake to 2,400 mg per day or the amount suggested by your healthcare provider.  · Limit canned, dried, cured, packaged, and fast foods. These can contain a lot of salt.  · Eat 8 to 10 servings of fruits and vegetables every day.  · Choose lean meats, fish, or chicken.  · Eat whole-grain pasta, brown rice, and beans.  · Eat 2 to 3 servings of low-fat or fat-free dairy products.  · Ask your doctor about the DASH eating plan. This plan helps reduce blood pressure.  · When you go to a restaurant, ask that your meal be prepared with no added salt.  Maintain a healthy weight  · Ask your healthcare provider how many calories to eat a day. Then stick to that number.  · Ask your healthcare provider what weight range is healthiest for you. If you are overweight, a weight loss of only 3% to 5% of your body weight can help lower blood pressure. Generally, a good weight loss goal is to lose 10% of your body weight in a year.  · Limit snacks and sweets.  · Get regular exercise.  Get up and get active  · Choose activities you enjoy. Find ones you can do with friends or family. This includes bicycling, dancing, walking, and jogging.  · Park farther away from building entrances.  · Use stairs instead of the elevator.  · When you can, walk or bike instead of driving.  · Moran leaves, garden, or do household repairs.  · Be active at a moderate to vigorous level of physical activity for at least 40 minutes for a minimum of 3 to 4 days a week.   Manage stress  · Make time to relax and enjoy  life. Find time to laugh.  · Communicate your concerns with your loved ones and your healthcare provider.  · Visit with family and friends, and keep up with hobbies.  Limit alcohol and quit smoking  · Men should have no more than 2 drinks per day.  · Women should have no more than 1 drink per day.  · Talk with your healthcare provider about quitting smoking. Smoking significantly increases your risk for heart disease and stroke. Ask your healthcare provider about community smoking cessation programs and other options.  Medicines  If lifestyle changes arent enough, your healthcare provider may prescribe high blood pressure medicine. Take all medicines as prescribed. If you have any questions about your medicines, ask your healthcare provider before stopping or changing them.   Date Last Reviewed: 4/27/2016  © 3749-3112 [employer]. [address] [place] [address], [place] [address]. All rights reserved. This information is not intended as a substitute for professional medical care. Always follow your healthcare professional's instructions.

## 2017-06-20 ENCOUNTER — TELEPHONE (OUTPATIENT)
Dept: FAMILY MEDICINE | Facility: CLINIC | Age: 72
End: 2017-06-20

## 2017-06-20 DIAGNOSIS — I10 ESSENTIAL HYPERTENSION: Primary | ICD-10-CM

## 2017-06-20 NOTE — TELEPHONE ENCOUNTER
----- Message from Mita Myers sent at 6/20/2017  7:14 AM CDT -----  Patient still having problems with pain in her rectum. Patient would like to have a stool culture, can she come  a kit?  Please call patient back at  [phone].      Thank you

## 2017-06-20 NOTE — TELEPHONE ENCOUNTER
Patient says when she had a bowel movement the odor she has been aware of from her rectum had the same odor. She is going to have a ctscan tomorrow and will wait on stool testing.

## 2017-06-20 NOTE — TELEPHONE ENCOUNTER
----- Message from Latisha Bennett sent at 6/20/2017 11:54 AM CDT -----  Contact: self-  [phone]  Patient called asking for orders for stool specimen. Patient has a odor coming from the rectum.Thanks!

## 2017-06-20 NOTE — TELEPHONE ENCOUNTER
Good morning! I have a pt of Dr Myers's on my schedule for a CT tomorrow that I need a stat creatinine ordered on please, # [id number]

## 2017-06-21 ENCOUNTER — HOSPITAL ENCOUNTER (OUTPATIENT)
Dept: RADIOLOGY | Facility: HOSPITAL | Age: 72
Discharge: HOME | End: 2017-06-21
Attending: FAMILY MEDICINE
Payer: MEDICARE

## 2017-06-21 DIAGNOSIS — K62.89 PAIN OF PERIANAL AREA: ICD-10-CM

## 2017-06-21 PROCEDURE — 25500020 PHARM REV CODE 255

## 2017-06-21 PROCEDURE — 72193 CT PELVIS W/DYE: CPT | Mod: TC

## 2017-06-21 PROCEDURE — 72193 CT PELVIS W/DYE: CPT | Mod: 26,,, | Performed by: RADIOLOGY

## 2017-06-21 RX ADMIN — IOHEXOL 75 ML: 350 INJECTION, SOLUTION INTRAVENOUS at 09:06

## 2017-06-21 RX ADMIN — IOHEXOL 30 ML: 350 INJECTION, SOLUTION INTRAVENOUS at 09:06

## 2017-06-22 DIAGNOSIS — K62.89 RECTAL MASS: Primary | ICD-10-CM

## 2017-06-26 ENCOUNTER — OFFICE VISIT (OUTPATIENT)
Dept: SURGERY | Facility: CLINIC | Age: 72
End: 2017-06-26
Payer: MEDICARE

## 2017-06-26 VITALS — SYSTOLIC BLOOD PRESSURE: 150 MMHG | HEIGHT: 66 IN | DIASTOLIC BLOOD PRESSURE: 66 MMHG | HEART RATE: 82 BPM

## 2017-06-26 DIAGNOSIS — R93.3 ABNORMAL FINDING ON GI TRACT IMAGING: ICD-10-CM

## 2017-06-26 DIAGNOSIS — K62.89 RECTAL PAIN: Primary | ICD-10-CM

## 2017-06-26 PROCEDURE — 1159F MED LIST DOCD IN RCRD: CPT | Mod: S$GLB,,, | Performed by: SURGERY

## 2017-06-26 PROCEDURE — 1126F AMNT PAIN NOTED NONE PRSNT: CPT | Mod: S$GLB,,, | Performed by: SURGERY

## 2017-06-26 PROCEDURE — 99999 PR PBB SHADOW E&M-EST. PATIENT-LVL II: CPT | Mod: PBBFAC,,, | Performed by: SURGERY

## 2017-06-26 PROCEDURE — 99204 OFFICE O/P NEW MOD 45 MIN: CPT | Mod: 25,S$GLB,, | Performed by: SURGERY

## 2017-06-26 PROCEDURE — 46600 DIAGNOSTIC ANOSCOPY SPX: CPT | Mod: S$GLB,,, | Performed by: SURGERY

## 2017-06-26 NOTE — PROGRESS NOTES
Subjective:       Patient ID: Erica Masterson is a 71 y.o. female.    Chief Complaint: Consult (rectal pain)    Referred by Dr. Myers with rectal pain.    Patient states that pain began about 3 months ago when she began physical therapy.  Also had some mild constipation at that time.  Denies bleeding, discharge.   States pain has improved recently and is pretty much gone now.  Had a CT scan which showed:      Prior hysterectomy.  Few diverticuli in the sigmoid colon without CT evidence of diverticulitis.  Stool versus soft tissue density in the right side of the rectum should be within reach of digital exam. Otherwise negative CT of the pelvis.  Lat colonoscopy 2015          Review of Systems   Constitutional: Negative for appetite change, chills, diaphoresis, fatigue, fever and unexpected weight change.   HENT: Negative for hearing loss, sore throat, trouble swallowing and voice change.    Eyes: Negative for visual disturbance.   Respiratory: Negative for cough, shortness of breath and wheezing.    Cardiovascular: Negative for chest pain, palpitations and leg swelling.   Gastrointestinal: Positive for constipation and rectal pain. Negative for abdominal distention, abdominal pain, anal bleeding, blood in stool, diarrhea, nausea and vomiting.   Genitourinary: Negative for difficulty urinating, dysuria, flank pain, frequency, hematuria, menstrual problem and urgency.   Musculoskeletal: Negative for arthralgias, back pain, joint swelling, myalgias and neck pain.   Skin: Negative for pallor and rash.   Neurological: Negative for dizziness, syncope, weakness and headaches.   Hematological: Negative for adenopathy. Does not bruise/bleed easily.   Psychiatric/Behavioral: Negative for suicidal ideas. The patient is not nervous/anxious.        Objective:      Physical Exam   Constitutional: She is oriented to person, place, and time. She appears well-developed and well-nourished. No distress.   HENT:   Head: Normocephalic  and atraumatic.   Right Ear: External ear normal.   Left Ear: External ear normal.   Eyes: Conjunctivae are normal. Pupils are equal, round, and reactive to light. Right eye exhibits no discharge. Left eye exhibits no discharge.   Neck: No tracheal deviation present. No thyromegaly present.   Cardiovascular: Normal rate and regular rhythm.    Pulmonary/Chest: Effort normal. No respiratory distress.   Abdominal: Soft. She exhibits no distension. There is no guarding.   Genitourinary: Rectal exam shows external hemorrhoid and internal hemorrhoid (grade 2 on anoscopy). Rectal exam shows no fissure, no mass, no tenderness and anal tone normal.   Musculoskeletal: She exhibits no edema or tenderness.   Lymphadenopathy:     She has no cervical adenopathy.   Neurological: She is alert and oriented to person, place, and time. No cranial nerve deficit.   Skin: Skin is warm and dry. No rash noted. She is not diaphoretic. No pallor.   Psychiatric: She has a normal mood and affect. Her behavior is normal. Judgment and thought content normal.       Assessment:       1. Rectal pain    2. Abnormal finding on GI tract imaging        Plan:       No palpable or visual masses of rectum.  No treatment necessary as pain has gone.  Call prn.

## 2017-06-26 NOTE — LETTER
June 26, 2017      Narayan yMers MD  [address]  [place] [address]           [facility]  [address]  [place] [address]  Phone: [phone]          Patient: Erica Masterson   MR Number: [id number]   YOB: 1945   Date of Visit: 6/26/2017       Dear Dr. Narayan Myers:    Thank you for referring Erica Masterson to me for evaluation. Attached you will find relevant portions of my assessment and plan of care.    If you have questions, please do not hesitate to call me. I look forward to following Erica Masterson along with you.    Sincerely,    Juvencio Herrera MD    Enclosure  CC:  No Recipients    If you would like to receive this communication electronically, please contact [email or web address] or [phone] to request more information on [employer] access.    For providers and/or their staff who would like to refer a patient to [facility], please contact us through our one-stop-shop provider referral line, [facility] , at [phone].    If you feel you have received this communication in error or would no longer like to receive these types of communications, please e-mail [email or web address]

## 2017-07-12 ENCOUNTER — DOCUMENTATION ONLY (OUTPATIENT)
Dept: FAMILY MEDICINE | Facility: CLINIC | Age: 72
End: 2017-07-12

## 2017-07-12 NOTE — PROGRESS NOTES
Pre-Visit Chart Review  For Appointment Scheduled on 8-16-17    There are no preventive care reminders to display for this patient.

## 2017-07-13 DIAGNOSIS — M46.1 SACROILIITIS: Primary | ICD-10-CM

## 2017-07-27 ENCOUNTER — OFFICE VISIT (OUTPATIENT)
Dept: PAIN MEDICINE | Facility: CLINIC | Age: 72
End: 2017-07-27
Payer: MEDICARE

## 2017-07-27 VITALS
DIASTOLIC BLOOD PRESSURE: 71 MMHG | BODY MASS INDEX: 26.19 KG/M2 | WEIGHT: 162.94 LBS | SYSTOLIC BLOOD PRESSURE: 157 MMHG | HEIGHT: 66 IN | HEART RATE: 71 BPM

## 2017-07-27 DIAGNOSIS — M70.61 GREATER TROCHANTERIC BURSITIS, RIGHT: ICD-10-CM

## 2017-07-27 DIAGNOSIS — M47.819 FACET ARTHROPATHY: ICD-10-CM

## 2017-07-27 DIAGNOSIS — M46.1 SACROILIITIS: Primary | ICD-10-CM

## 2017-07-27 PROCEDURE — 1125F AMNT PAIN NOTED PAIN PRSNT: CPT | Mod: S$GLB,,, | Performed by: PHYSICIAN ASSISTANT

## 2017-07-27 PROCEDURE — 1159F MED LIST DOCD IN RCRD: CPT | Mod: S$GLB,,, | Performed by: PHYSICIAN ASSISTANT

## 2017-07-27 PROCEDURE — 99214 OFFICE O/P EST MOD 30 MIN: CPT | Mod: S$GLB,,, | Performed by: PHYSICIAN ASSISTANT

## 2017-07-27 PROCEDURE — 99499 UNLISTED E&M SERVICE: CPT | Mod: S$PBB,,, | Performed by: PHYSICIAN ASSISTANT

## 2017-07-27 PROCEDURE — 99999 PR PBB SHADOW E&M-EST. PATIENT-LVL III: CPT | Mod: PBBFAC,,, | Performed by: PHYSICIAN ASSISTANT

## 2017-07-27 NOTE — PROGRESS NOTES
Referring Physician: No ref. provider found    PCP: Narayan Myers MD      CC: Right buttock pain    Interval History:  Mrs. Masterson is a 71 y.o. female with right buttock pain who presents today for f/u to discuss scheduled SI joint injection. Overall pain has improved with exercises but is not resolved. She reports right buttock and lateral hip pain. Pain is burning and tight in nature. Hip xrays were normal. Pain today is rated 2/10.  Pt has been seen in the clinic before, however pt is new to me.     History below per Dr. Chiang    HPI:   Erica Masterson is a 71 y.o. female referred to us for right buttock pain.  Pain has been present for the past 6 months.  No recent traumatic incident.  She has constant burning, tight, deep pain in her right buttock.  Pain radiates to her right lateral thigh as well as her right groin.  Pain worsens with sitting, laying, walking, getting up.  Pain improves with rest.  She is undergoing physical therapy with mild benefits.  She denies any leg weakness.  No bowel bladder changes.  X-rays of her hips were normal.  She had some mild osteoarthritis of the lumbar and SI joints.  She rates her pain 4/10 today, 6/10 at worse.    ROS:  CONSTITUTIONAL: No fevers, chills, night sweats, wt. loss, appetite changes  SKIN: no rashes or itching  ENT: No headaches, head trauma, vision changes, or eye pain  LYMPH NODES: None noticed   CV: No chest pain, palpitations.   RESP: No shortness of breath, dyspnea on exertion, cough, wheezing, or hemoptysis  GI: No nausea, emesis, diarrhea, constipation, melena, hematochezia, pain.    : No dysuria, hematuria, urgency, or frequency   HEME: No easy bruising, bleeding problems  PSYCHIATRIC: No depression, anxiety, psychosis, hallucinations.  NEURO: No seizures, memory loss, dizziness or difficulty sleeping  MSK: + History of present illness      Past Medical History:   Diagnosis Date    Allergy     Cataract     Colon polyp     Degenerative arthritis of  knee 4/3/2012    Depression 3/26/2012    Diverticulosis     GERD (gastroesophageal reflux disease)     Hyperlipidemia     Hypertension     Multinodular goiter 3/26/2012    Myopathy, unspecified 1/18/2010    Tuberculosis      Past Surgical History:   Procedure Laterality Date    COLONOSCOPY  12/2/15    Dr. Britton, multiple polyps, recheck five years-three years if more than 2 polyps are adenomatous    COLONOSCOPY N/A 12/2/2015    FOOT SURGERY      HYSTERECTOMY      KNEE SURGERY  06/06/2013    right knee tear Dr Iverson     LUNG LOBECTOMY  1966    right middle lobectomy, due to Tb    SHOULDER SURGERY      francis      Family History   Problem Relation Age of Onset    Colon cancer Other     Cancer Other     Cancer Mother     Hypertension Mother     Cancer Brother     Hypertension Father     Diabetes Son     Hypertension Son     Diabetes Maternal Aunt     Alzheimer's disease Maternal Uncle     Cancer Maternal Grandmother     No Known Problems Daughter     Cancer Sister      breast    Dementia Sister     Alzheimer's disease Sister     Obesity Paternal Uncle     Melanoma Neg Hx     Psoriasis Neg Hx     Lupus Neg Hx     Eczema Neg Hx     Amblyopia Neg Hx     Blindness Neg Hx     Cataracts Neg Hx     Glaucoma Neg Hx     Macular degeneration Neg Hx     Retinal detachment Neg Hx     Strabismus Neg Hx     Stroke Neg Hx     Thyroid disease Neg Hx      Social History     Social History    Marital status:      Spouse name: N/A    Number of children: N/A    Years of education: N/A     Social History Main Topics    Smoking status: Former Smoker    Smokeless tobacco: Never Used      Comment: quit in 1963    Alcohol use Yes      Comment: Occasionally    Drug use: No    Sexual activity: Not Currently     Other Topics Concern    None     Social History Narrative    None         Medications/Allergies: See med card    Vitals:    07/27/17 1124   BP: (!) 157/71   Pulse: 71   Weight:  "73 kg (161 lb)   Height: 5' 6" (1.676 m)   PainSc:   2   PainLoc: Back         Physical exam:    GENERAL: A and O x3, the patient appears well groomed and is in no acute distress.  Skin: No rashes or obvious lesions  HEENT: normocephalic, atraumatic  CARDIOVASCULAR:  RRR  LUNGS: non labored breathing  ABDOMEN: soft, nontender   UPPER EXTREMITIES: Normal alignment, normal range of motion, no atrophy, no skin changes,  hair growth and nail growth normal and equal bilaterally. No swelling, no tenderness.    LOWER EXTREMITIES:  Normal alignment, normal range of motion, no atrophy, no skin changes,  hair growth and nail growth normal and equal bilaterally. No swelling, no tenderness.    LUMBAR SPINE  Lumbar spine: ROM is full with flexion extension and oblique extension with mild increased pain.    Nadeem's test causes increased pain on right side.    Supine straight leg raise is negative bilaterally.    Internal and external rotation of the hip causes no increased pain on either side.  Myofascial exam: No tenderness to palpation across lumbar paraspinous muscles.  Right GTB and SIJ tenderness to palpation    MENTAL STATUS: normal orientation, speech, language, and fund of knowledge for social situation.  Emotional state appropriate.    CRANIAL NERVES:  II:  PERRL bilaterally,   III,IV,VI: EOMI.    V:  Facial sensation equal bilaterally  VII:  Facial motor function normal.  VIII:  Hearing equal to finger rub bilaterally  IX/X: Gag normal, palate symmetric  XI:  Shoulder shrug equal, head turn equal  XII:  Tongue midline without fasciculations      MOTOR: Tone and bulk: normal bilateral upper and lower Strength: normal   Delt Bi Tri WE WF     R 5 5 5 5 5 5   L 5 5 5 5 5 5     IP ADD ABD Quad TA Gas HAM  R 5 5 5 5 5 5 5  L 5 5 5 5 5 5 5    SENSATION: Light touch and pinprick intact bilaterally  REFLEXES: normal, symmetric, nonbrisk.  Toes down, no clonus. No hoffmans.  GAIT: normal rise, base, steps, and arm swing.  "       Imaging:  Xray L-spine 10/2016  views lumbar spine demonstrate moderate mid and lower lumbar degenerative disc and facet disease without evidence of fracture or subluxation.     Xray Hips 3/2017  No acute abnormality is seen.  Mild, symmetric bilateral SI joint osteoarthritis.    Assessment:  Mrs. Masterson is a 71 y.o. female with right buttock pain  1. Sacroiliitis    2. Greater trochanteric bursitis, right    3. Facet arthropathy          Plan:  1. I have stressed the importance of physical activity and exercise to improve overall health.  Continue PT as ordered  2. Add right GTB injection to right SI injection under fluoroscopy as both a diagnostic/therapeutic procedure right leg pain.  3. May consider lumbar MBB if above is not helpful.    4. F/u s/p procedure

## 2017-08-16 ENCOUNTER — OFFICE VISIT (OUTPATIENT)
Dept: FAMILY MEDICINE | Facility: CLINIC | Age: 72
End: 2017-08-16
Payer: MEDICARE

## 2017-08-16 VITALS
HEART RATE: 52 BPM | HEIGHT: 66 IN | DIASTOLIC BLOOD PRESSURE: 58 MMHG | OXYGEN SATURATION: 99 % | TEMPERATURE: 98 F | SYSTOLIC BLOOD PRESSURE: 134 MMHG | BODY MASS INDEX: 25.75 KG/M2 | WEIGHT: 160.25 LBS | RESPIRATION RATE: 16 BRPM

## 2017-08-16 DIAGNOSIS — I10 HYPERTENSION, ESSENTIAL: ICD-10-CM

## 2017-08-16 DIAGNOSIS — I10 GOOD HYPERTENSION CONTROL: ICD-10-CM

## 2017-08-16 DIAGNOSIS — Z00.00 ANNUAL PHYSICAL EXAM: Primary | ICD-10-CM

## 2017-08-16 DIAGNOSIS — M17.10 PRIMARY OSTEOARTHRITIS OF KNEE, UNSPECIFIED LATERALITY: ICD-10-CM

## 2017-08-16 DIAGNOSIS — M54.50 CHRONIC RIGHT-SIDED LOW BACK PAIN WITHOUT SCIATICA: ICD-10-CM

## 2017-08-16 DIAGNOSIS — G89.29 CHRONIC RIGHT-SIDED LOW BACK PAIN WITHOUT SCIATICA: ICD-10-CM

## 2017-08-16 DIAGNOSIS — E78.5 HYPERLIPIDEMIA, UNSPECIFIED HYPERLIPIDEMIA TYPE: ICD-10-CM

## 2017-08-16 PROCEDURE — 99999 PR PBB SHADOW E&M-EST. PATIENT-LVL III: CPT | Mod: PBBFAC,,, | Performed by: FAMILY MEDICINE

## 2017-08-16 PROCEDURE — 99499 UNLISTED E&M SERVICE: CPT | Mod: S$PBB,,, | Performed by: FAMILY MEDICINE

## 2017-08-16 PROCEDURE — 99397 PER PM REEVAL EST PAT 65+ YR: CPT | Mod: S$GLB,,, | Performed by: FAMILY MEDICINE

## 2017-08-16 RX ORDER — PENCICLOVIR 10 MG/G
CREAM TOPICAL DAILY PRN
COMMUNITY
Start: 2017-08-08 | End: 2019-04-05 | Stop reason: SDUPTHER

## 2017-08-16 RX ORDER — LISINOPRIL 20 MG/1
20 TABLET ORAL DAILY
Qty: 90 TABLET | Refills: 3 | Status: SHIPPED | OUTPATIENT
Start: 2017-08-16 | End: 2018-08-16

## 2017-08-16 RX ORDER — MELATONIN 5 MG
1 TABLET, SUBLINGUAL SUBLINGUAL NIGHTLY
COMMUNITY
End: 2018-07-23

## 2017-08-16 RX ORDER — CHLORHEXIDINE GLUCONATE ORAL RINSE 1.2 MG/ML
15 SOLUTION DENTAL WEEKLY
COMMUNITY
Start: 2017-07-31 | End: 2018-07-23

## 2017-08-16 NOTE — PROGRESS NOTES
Subjective:       Patient ID: Erica Masterson is a 71 y.o. female.    Chief Complaint: Annual Exam    71-year-old female coming in for an annual exam.  She continues to have some pain in the right back area and did see Dr. Chiang who has recommended an SI joint in for injection as well as a right greater trochanter injection in these are scheduled to be done next Monday.  She has an appointment with GI the following Friday but her abdominal discomfort is improved.  She took some simethicone for a few days and it did help with the bloating and gas pain she is not currently using it.  History includes hypertension, hyperlipidemia, multinodular goiter, osteoarthritis, TB with a right middle lobectomy, colon polyps with last colonoscopy done in December 2015, depression and anxiety and diverticulosis.  Overall she feels fairly well at this time.  She has some questions about insomnia and anxiety.    Past Medical History:  No date: Allergy  No date: Cataract  No date: Colon polyp  4/3/2012: Degenerative arthritis of knee  3/26/2012: Depression  No date: Diverticulosis  No date: GERD (gastroesophageal reflux disease)  No date: Hyperlipidemia  No date: Hypertension  3/26/2012: Multinodular goiter  1/18/2010: Myopathy, unspecified  No date: Tuberculosis    Past Surgical History:  12/2/15: COLONOSCOPY      Comment: Dr. Britton, multiple polyps, recheck five                years-three years if more than 2 polyps are                adenomatous  12/2/2015: COLONOSCOPY N/A  No date: FOOT SURGERY  No date: HYSTERECTOMY  06/06/2013: KNEE SURGERY      Comment: right knee tear Dr Iverson   1966: LUNG LOBECTOMY      Comment: right middle lobectomy, due to Tb  No date: SHOULDER SURGERY      Comment: francis     Review of patient's family history indicates:    Colon cancer                   Other                     Cancer                         Other                     Cancer                         Mother                    Hypertension                    Mother                    Cancer                         Brother                   Hypertension                   Father                    Diabetes                       Son                       Hypertension                   Son                       Diabetes                       Maternal Aunt             Alzheimer's disease            Maternal Uncle            Cancer                         Maternal Grandmother      No Known Problems              Daughter                  Cancer                         Sister                      Comment: breast    Dementia                       Sister                    Alzheimer's disease            Sister                    Obesity                        Paternal Uncle            Melanoma                       Neg Hx                    Psoriasis                      Neg Hx                    Lupus                          Neg Hx                    Eczema                         Neg Hx                    Amblyopia                      Neg Hx                    Blindness                      Neg Hx                    Cataracts                      Neg Hx                    Glaucoma                       Neg Hx                    Macular degeneration           Neg Hx                    Retinal detachment             Neg Hx                    Strabismus                     Neg Hx                    Stroke                         Neg Hx                    Thyroid disease                Neg Hx                    Social History    Marital status:              Spouse name:                       Years of education:                 Number of children:               Social History Main Topics    Smoking status: Former Smoker                                                                Packs/day: 0.00      Years: 0.00        Smokeless tobacco: Never Used                        Comment: quit in 1963    Alcohol use: Yes                Comment: Occasionally    Drug  use: No              Sexual activity: Not Currently            Current Outpatient Prescriptions:     aspirin (ECOTRIN) 81 MG EC tablet, Take 162 mg by mouth once daily. , Disp: , Rfl:     atorvastatin (LIPITOR) 40 MG tablet, Take 1 tablet (40 mg total) by mouth once daily., Disp: 90 tablet, Rfl: 2    chlorhexidine (PERIDEX) 0.12 % solution, Use as directed 15 mLs in the mouth or throat once a week. , Disp: , Rfl:     DENAVIR 1 % cream, Apply topically daily as needed. , Disp: , Rfl:     ibandronate (BONIVA) 150 mg tablet, Take 1 tablet (150 mg total) by mouth every 30 days., Disp: 1 tablet, Rfl: 11    lisinopril (PRINIVIL,ZESTRIL) 20 MG tablet, Take 1 tablet (20 mg total) by mouth once daily., Disp: 90 tablet, Rfl: 3    melatonin 5 mg Subl, Place 1 tablet under the tongue every evening., Disp: , Rfl:     povidone (SOOTHE HYDRATION) 1.25 % Drop, Apply 1 drop to eye once daily. , Disp: , Rfl:     tizanidine (ZANAFLEX) 4 MG tablet, Take 4 mg by mouth 3 (three) times daily as needed. , Disp: , Rfl:     Influenza Vaccine due on 08/01/2017        Review of Systems   Constitutional: Negative for chills, diaphoresis, fatigue, fever and unexpected weight change.   HENT: Negative for congestion, ear pain, hearing loss, postnasal drip, sinus pressure, sneezing, sore throat, tinnitus and trouble swallowing.    Eyes: Negative for itching and visual disturbance.   Respiratory: Negative for cough, chest tightness, shortness of breath and wheezing.    Cardiovascular: Negative for chest pain, palpitations and leg swelling.   Gastrointestinal: Positive for abdominal distention, abdominal pain and constipation. Negative for blood in stool, diarrhea, nausea and vomiting.   Genitourinary: Negative for dysuria, frequency, hematuria, menstrual problem, pelvic pain, vaginal bleeding and vaginal discharge.   Musculoskeletal: Positive for arthralgias and back pain. Negative for joint swelling and myalgias.   Skin: Negative for color  change, pallor and rash.   Neurological: Negative for dizziness and headaches.   Hematological: Negative for adenopathy.   Psychiatric/Behavioral: Negative for sleep disturbance. The patient is not nervous/anxious.        Objective:      Physical Exam   Constitutional: She is oriented to person, place, and time. She appears well-developed and well-nourished. No distress.   Good blood pressure control.  She brings in a log with systolics ranging from  and diastolics ranging from 61-92 the majority of them are in the 110-120/60-70 range.  Weight is normal with a BMI of 26.3.  She is down 1.1 pounds from her last visit June 19, 2017   HENT:   Head: Normocephalic and atraumatic.   Right Ear: External ear normal.   Left Ear: External ear normal.   Nose: Nose normal.   Mouth/Throat: Oropharynx is clear and moist. No oropharyngeal exudate.   Eyes: Conjunctivae are normal. Pupils are equal, round, and reactive to light. Right eye exhibits no discharge. Left eye exhibits no discharge. No scleral icterus.   Neck: Normal range of motion. Neck supple. No JVD present. No tracheal deviation present. No thyromegaly present.   Cardiovascular: Normal rate, regular rhythm and normal heart sounds.  Exam reveals no gallop and no friction rub.    No murmur heard.  Pulmonary/Chest: Effort normal and breath sounds normal. No respiratory distress. She has no wheezes. She has no rales. She exhibits no tenderness.   Abdominal: Soft. Bowel sounds are normal. She exhibits no distension and no mass. There is no tenderness. There is no rebound and no guarding.   Musculoskeletal: Normal range of motion. She exhibits no edema or tenderness.   Lymphadenopathy:     She has no cervical adenopathy.   Neurological: She is alert and oriented to person, place, and time. She has normal reflexes. She displays normal reflexes. No cranial nerve deficit. She exhibits normal muscle tone.   Skin: Skin is warm and dry. No rash noted. She is not  diaphoretic. No erythema.   Psychiatric: She has a normal mood and affect. Her behavior is normal. Thought content normal.   Nursing note and vitals reviewed.      Assessment:       1. Annual physical exam    2. Good hypertension control    3. Hyperlipidemia, unspecified hyperlipidemia type    4. Hypertension, essential    5. Primary osteoarthritis of knee, unspecified laterality    6. Chronic right-sided low back pain without sciatica    7. BMI 26.0-26.9,adult        Plan:       1. Annual physical exam  - Comprehensive metabolic panel; Future  - CBC auto differential; Future  - Lipid panel; Future    2. Good hypertension control  No change needed  - Comprehensive metabolic panel; Future  - CBC auto differential; Future    3. Hyperlipidemia, unspecified hyperlipidemia type  Schedule lab in september  - Comprehensive metabolic panel; Future  - Lipid panel; Future    4. Hypertension, essential  - lisinopril (PRINIVIL,ZESTRIL) 20 MG tablet; Take 1 tablet (20 mg total) by mouth once daily.  Dispense: 90 tablet; Refill: 3    5. Primary osteoarthritis of knee, unspecified laterality    6. Chronic right-sided low back pain without sciatica    7. BMI 26.0-26.9,adult         Patient readiness: acceptance and barriers:none    During the course of the visit the patient was educated and counseled about the following:     Hypertension:   Medication: no change.  Dietary sodium restriction.  Regular aerobic exercise.    Goals: Hypertension: Reduce Blood Pressure    Did patient meet goals/outcomes: Yes    The following self management tools provided: blood pressure log    Patient Instructions (the written plan) was given to the patient/family.     Time spent with patient: 45 minutes

## 2017-08-17 ENCOUNTER — EDUCATION (OUTPATIENT)
Dept: FAMILY MEDICINE | Facility: CLINIC | Age: 72
End: 2017-08-17

## 2017-08-17 ENCOUNTER — TELEPHONE (OUTPATIENT)
Dept: FAMILY MEDICINE | Facility: CLINIC | Age: 72
End: 2017-08-17

## 2017-08-17 NOTE — TELEPHONE ENCOUNTER
Spoke to patient. Advised that she was diagnosed with diverticulosis and not diverticulitis. Explained the difference and that treatment is not required. Patient verbalized understanding.  Education material mailed to patient at her request.

## 2017-08-17 NOTE — TELEPHONE ENCOUNTER
----- Message from Berenice Gallagher sent at 8/17/2017  7:52 AM CDT -----  Contact: self  Patient [phone] or [phone] was seen yesterday 08 16 17 and diagnosed with diverticulitis/she is calling to discuss this as she does not know what she needs to do for this/please call

## 2017-08-17 NOTE — PATIENT INSTRUCTIONS

## 2017-08-25 ENCOUNTER — OFFICE VISIT (OUTPATIENT)
Dept: GASTROENTEROLOGY | Facility: CLINIC | Age: 72
End: 2017-08-25
Payer: MEDICARE

## 2017-08-25 VITALS
HEIGHT: 66 IN | BODY MASS INDEX: 26.14 KG/M2 | DIASTOLIC BLOOD PRESSURE: 63 MMHG | HEART RATE: 65 BPM | WEIGHT: 162.69 LBS | SYSTOLIC BLOOD PRESSURE: 145 MMHG

## 2017-08-25 DIAGNOSIS — K57.90 DIVERTICULOSIS OF INTESTINE WITHOUT BLEEDING, UNSPECIFIED INTESTINAL TRACT LOCATION: ICD-10-CM

## 2017-08-25 DIAGNOSIS — D12.6 ADENOMATOUS POLYP OF COLON, UNSPECIFIED PART OF COLON: Primary | ICD-10-CM

## 2017-08-25 DIAGNOSIS — K59.09 CHRONIC CONSTIPATION: ICD-10-CM

## 2017-08-25 PROCEDURE — 99214 OFFICE O/P EST MOD 30 MIN: CPT | Mod: S$GLB,,, | Performed by: INTERNAL MEDICINE

## 2017-08-25 PROCEDURE — 99999 PR PBB SHADOW E&M-EST. PATIENT-LVL III: CPT | Mod: PBBFAC,,, | Performed by: INTERNAL MEDICINE

## 2017-08-25 PROCEDURE — 1126F AMNT PAIN NOTED NONE PRSNT: CPT | Mod: S$GLB,,, | Performed by: INTERNAL MEDICINE

## 2017-08-25 PROCEDURE — 3077F SYST BP >= 140 MM HG: CPT | Mod: S$GLB,,, | Performed by: INTERNAL MEDICINE

## 2017-08-25 PROCEDURE — 1159F MED LIST DOCD IN RCRD: CPT | Mod: S$GLB,,, | Performed by: INTERNAL MEDICINE

## 2017-08-25 PROCEDURE — 3078F DIAST BP <80 MM HG: CPT | Mod: S$GLB,,, | Performed by: INTERNAL MEDICINE

## 2017-08-25 PROCEDURE — 3008F BODY MASS INDEX DOCD: CPT | Mod: S$GLB,,, | Performed by: INTERNAL MEDICINE

## 2017-08-25 NOTE — PROGRESS NOTES
"Subjective:       Patient ID: Erica Masterson is a 71 y.o. female.    This is an established patient.      Chief Complaint: Constipation (rectal odor )    PAST HISTORY:    Patient seen for constipation, onset remote, with stool frequency every couple of days, moderate in severity, with stool form being hard and small like daryl.  Associated signs and symptoms include straining and strange "acidic rectal odor" and alleviating/exacerbating factors include none.   Last colonoscopy was in 2015 with adenomatous polyps noted.  She denies bleeding, weight loss, or change in bowel habits from her baseline constipation.  She has tried bisacodyl with some relief.  She denies any other complaints currently.  She states that if she does not take bisacodyl, she will have a few days in between BMs.        INTERVAL HISTORY:  Since last visit, patient has tried exercise, water, fiber, stool softner, and PRN laxative with minimal relief.  She is still having BM every 3 days.  No bleeding or weight loss.  No new complaints.  She has diverticulosis without diverticulitis.      Review of Systems   Constitutional: Negative for chills, fatigue and fever.   HENT: Negative for sore throat.    Respiratory: Negative for cough, shortness of breath and wheezing.    Cardiovascular: Negative for chest pain and palpitations.   Gastrointestinal: Positive for constipation. Negative for abdominal pain, blood in stool, nausea and vomiting.   Genitourinary: Negative for dysuria and hematuria.   All other systems reviewed and are negative.      Objective:         Vitals:    08/25/17 1010   BP: (!) 145/63   Pulse: 65   Weight: 73.8 kg (162 lb 11.2 oz)   Height: 5' 5.5" (1.664 m)       Physical Exam   Constitutional: She is oriented to person, place, and time. She appears well-developed and well-nourished.   HENT:   Head: Normocephalic and atraumatic.   Eyes: Pupils are equal, round, and reactive to light. No scleral icterus.   Cardiovascular: Normal " rate and regular rhythm.    No murmur heard.  Pulmonary/Chest: Effort normal and breath sounds normal. She has no wheezes.   Abdominal: Soft. Bowel sounds are normal. She exhibits no distension. There is no tenderness.   Neurological: She is alert and oriented to person, place, and time.         Lab Results   Component Value Date    WBC 6.30 06/12/2016    HGB 12.2 06/12/2016    HCT 36.9 (L) 06/12/2016    MCV 89 06/12/2016     06/12/2016       CMP  Sodium   Date Value Ref Range Status   01/05/2017 142 136 - 145 mmol/L Final     Potassium   Date Value Ref Range Status   01/05/2017 4.5 3.5 - 5.1 mmol/L Final     Chloride   Date Value Ref Range Status   01/05/2017 107 95 - 110 mmol/L Final     CO2   Date Value Ref Range Status   01/05/2017 28 23 - 29 mmol/L Final     Glucose   Date Value Ref Range Status   01/05/2017 94 70 - 110 mg/dL Final     BUN, Bld   Date Value Ref Range Status   01/05/2017 21 8 - 23 mg/dL Final     Creatinine   Date Value Ref Range Status   06/21/2017 1.0 0.5 - 1.4 mg/dL Final   06/04/2013 0.9 0.5 - 1.4 mg/dL Final     Calcium   Date Value Ref Range Status   01/05/2017 9.4 8.7 - 10.5 mg/dL Final   06/04/2013 9.7 8.7 - 10.5 mg/dL Final     Total Protein   Date Value Ref Range Status   06/12/2016 7.5 6.0 - 8.4 g/dL Final     Albumin   Date Value Ref Range Status   06/12/2016 4.1 3.5 - 5.2 g/dL Final     Total Bilirubin   Date Value Ref Range Status   06/12/2016 0.5 0.1 - 1.0 mg/dL Final     Comment:     For infants and newborns, interpretation of results should be based  on gestational age, weight and in agreement with clinical  observations.  Premature Infant recommended reference ranges:  Up to 24 hours.............<8.0 mg/dL  Up to 48 hours............<12.0 mg/dL  3-5 days..................<15.0 mg/dL  6-29 days.................<15.0 mg/dL       Alkaline Phosphatase   Date Value Ref Range Status   06/12/2016 58 55 - 135 U/L Final     AST   Date Value Ref Range Status   06/12/2016 19 10 -  40 U/L Final     ALT   Date Value Ref Range Status   06/12/2016 20 10 - 44 U/L Final     Anion Gap   Date Value Ref Range Status   01/05/2017 7 (L) 8 - 16 mmol/L Final   06/04/2013 9 5 - 15 meq/L Final     eGFR if    Date Value Ref Range Status   06/21/2017 >60 >60 mL/min/1.73 m^2 Final     eGFR if non    Date Value Ref Range Status   06/21/2017 56 (A) >60 mL/min/1.73 m^2 Final     Comment:     Calculation used to obtain the estimated glomerular filtration  rate (eGFR) is the CKD-EPI equation. Since race is unknown   in our information system, the eGFR values for   -American and Non--American patients are given   for each creatinine result.         Colonoscopy and biopsies reviewed.    Assessment:       1. Adenomatous polyp of colon, unspecified part of colon    2. Diverticulosis of intestine without bleeding, unspecified intestinal tract location    3. Chronic constipation        Plan:       1.  Recommend probiotics  2.  Recommend daily exercise, adequate water intake, and high fiber diet.  Recommend daily miralax (17g PO once or twice daily) with intermittently dosed dulcolax (every 2-3 days) to facilitate bowel movements.  If no relief with this, consider adding emollient laxative (castor oil or mineral oil) +/- enema.  3.  Repeat colonoscopy in 2018.    4.  Trial of Linzess. Rx sent.

## 2017-09-01 ENCOUNTER — TELEPHONE (OUTPATIENT)
Dept: CARDIOLOGY | Facility: CLINIC | Age: 72
End: 2017-09-01

## 2017-09-01 NOTE — TELEPHONE ENCOUNTER
Called pt to reschedule October 24th appointment per Dr. Stubbs. Appointment rescheduled to October 17th at 1:00pm. Ok verbalized for new date and time. Letter also mailed out. No further issues discussed.

## 2017-09-12 ENCOUNTER — LAB VISIT (OUTPATIENT)
Dept: LAB | Facility: HOSPITAL | Age: 72
End: 2017-09-12
Attending: FAMILY MEDICINE
Payer: MEDICARE

## 2017-09-12 DIAGNOSIS — I10 GOOD HYPERTENSION CONTROL: ICD-10-CM

## 2017-09-12 DIAGNOSIS — E78.5 HYPERLIPIDEMIA, UNSPECIFIED HYPERLIPIDEMIA TYPE: ICD-10-CM

## 2017-09-12 DIAGNOSIS — Z00.00 ANNUAL PHYSICAL EXAM: ICD-10-CM

## 2017-09-12 LAB
ALBUMIN SERPL BCP-MCNC: 3.3 G/DL
ALP SERPL-CCNC: 58 U/L
ALT SERPL W/O P-5'-P-CCNC: 19 U/L
ANION GAP SERPL CALC-SCNC: 11 MMOL/L
AST SERPL-CCNC: 19 U/L
BASOPHILS # BLD AUTO: 0.04 K/UL
BASOPHILS NFR BLD: 0.7 %
BILIRUB SERPL-MCNC: 0.5 MG/DL
BUN SERPL-MCNC: 15 MG/DL
CALCIUM SERPL-MCNC: 9 MG/DL
CHLORIDE SERPL-SCNC: 107 MMOL/L
CHOLEST SERPL-MCNC: 167 MG/DL
CHOLEST/HDLC SERPL: 2.9 {RATIO}
CO2 SERPL-SCNC: 24 MMOL/L
CREAT SERPL-MCNC: 0.9 MG/DL
DIFFERENTIAL METHOD: NORMAL
EOSINOPHIL # BLD AUTO: 0.3 K/UL
EOSINOPHIL NFR BLD: 6.4 %
ERYTHROCYTE [DISTWIDTH] IN BLOOD BY AUTOMATED COUNT: 12.9 %
EST. GFR  (AFRICAN AMERICAN): >60 ML/MIN/1.73 M^2
EST. GFR  (NON AFRICAN AMERICAN): >60 ML/MIN/1.73 M^2
GLUCOSE SERPL-MCNC: 89 MG/DL
HCT VFR BLD AUTO: 38.5 %
HDLC SERPL-MCNC: 58 MG/DL
HDLC SERPL: 34.7 %
HGB BLD-MCNC: 12.6 G/DL
LDLC SERPL CALC-MCNC: 98.2 MG/DL
LYMPHOCYTES # BLD AUTO: 1.7 K/UL
LYMPHOCYTES NFR BLD: 32.2 %
MCH RBC QN AUTO: 29.4 PG
MCHC RBC AUTO-ENTMCNC: 32.7 G/DL
MCV RBC AUTO: 90 FL
MONOCYTES # BLD AUTO: 0.5 K/UL
MONOCYTES NFR BLD: 9 %
NEUTROPHILS # BLD AUTO: 2.8 K/UL
NEUTROPHILS NFR BLD: 51.5 %
NONHDLC SERPL-MCNC: 109 MG/DL
PLATELET # BLD AUTO: 298 K/UL
PMV BLD AUTO: 9.5 FL
POTASSIUM SERPL-SCNC: 4 MMOL/L
PROT SERPL-MCNC: 7.1 G/DL
RBC # BLD AUTO: 4.29 M/UL
SODIUM SERPL-SCNC: 142 MMOL/L
TRIGL SERPL-MCNC: 54 MG/DL
WBC # BLD AUTO: 5.34 K/UL

## 2017-09-12 PROCEDURE — 80061 LIPID PANEL: CPT

## 2017-09-12 PROCEDURE — 80053 COMPREHEN METABOLIC PANEL: CPT

## 2017-09-12 PROCEDURE — 36415 COLL VENOUS BLD VENIPUNCTURE: CPT | Mod: PO

## 2017-09-12 PROCEDURE — 85025 COMPLETE CBC W/AUTO DIFF WBC: CPT

## 2017-09-18 ENCOUNTER — CLINICAL SUPPORT (OUTPATIENT)
Dept: OPHTHALMOLOGY | Facility: CLINIC | Age: 72
End: 2017-09-18
Attending: OPHTHALMOLOGY
Payer: MEDICARE

## 2017-09-18 ENCOUNTER — OFFICE VISIT (OUTPATIENT)
Dept: OPHTHALMOLOGY | Facility: CLINIC | Age: 72
End: 2017-09-18
Payer: MEDICARE

## 2017-09-18 DIAGNOSIS — H26.9 CORTICAL CATARACT OF BOTH EYES: ICD-10-CM

## 2017-09-18 DIAGNOSIS — H25.13 NUCLEAR SCLEROSIS, BILATERAL: ICD-10-CM

## 2017-09-18 DIAGNOSIS — H52.03 HYPEROPIA WITH ASTIGMATISM AND PRESBYOPIA, BILATERAL: ICD-10-CM

## 2017-09-18 DIAGNOSIS — H52.203 HYPEROPIA WITH ASTIGMATISM AND PRESBYOPIA, BILATERAL: ICD-10-CM

## 2017-09-18 DIAGNOSIS — H52.4 HYPEROPIA WITH ASTIGMATISM AND PRESBYOPIA, BILATERAL: ICD-10-CM

## 2017-09-18 DIAGNOSIS — H40.003 GLAUCOMA SUSPECT, BILATERAL: Primary | ICD-10-CM

## 2017-09-18 DIAGNOSIS — H40.003 GLAUCOMA SUSPECT, BILATERAL: ICD-10-CM

## 2017-09-18 PROCEDURE — 99499 UNLISTED E&M SERVICE: CPT | Mod: S$PBB,,, | Performed by: OPHTHALMOLOGY

## 2017-09-18 PROCEDURE — 92012 INTRM OPH EXAM EST PATIENT: CPT | Mod: S$GLB,,, | Performed by: OPHTHALMOLOGY

## 2017-09-18 PROCEDURE — 99999 PR PBB SHADOW E&M-EST. PATIENT-LVL III: CPT | Mod: PBBFAC,,, | Performed by: OPHTHALMOLOGY

## 2017-09-18 PROCEDURE — 92133 CPTRZD OPH DX IMG PST SGM ON: CPT | Mod: S$GLB,,, | Performed by: OPHTHALMOLOGY

## 2017-09-18 PROCEDURE — 92083 EXTENDED VISUAL FIELD XM: CPT | Mod: S$GLB,,, | Performed by: OPHTHALMOLOGY

## 2017-09-18 RX ORDER — TRAZODONE HYDROCHLORIDE 100 MG/1
100 TABLET ORAL NIGHTLY
COMMUNITY
End: 2017-10-13 | Stop reason: SDUPTHER

## 2017-09-18 NOTE — PROGRESS NOTES
"HPI     Glaucoma Suspect    Additional comments: 4 month iop ch with HVF OCT N./// no drop at this   time//           Comments   4 month iop ch with HVF OCT N./// no drop at this time//       Last edited by Shruti Cortez on 9/18/2017  9:48 AM. (History)            Assessment /Plan     For exam results, see Encounter Report.    Glaucoma suspect, bilateral    Nuclear sclerosis, bilateral    Cortical cataract of both eyes    Hyperopia with astigmatism and presbyopia, bilateral            Glaucoma suspect, bilateral - C:D ratio appears increased from others documented in epic. Pt was followed before for glaucoma suspect by Dr. Stoddard, testing done back in 2006. IOP WNL (20 OU again today). No known family history. Last HRT  suggests some RNFL thinning OD. HVF OS unreliable on 6/5/17, today WNL OU. Last gonio open to TM/thin SS OU. OCT nerve WNL OU. RTC 4 months IOP check, CCT.    Nuclear sclerosis, bilateral - BAT may be visually significant OS, but not OD. Do not recommend cataract surgery at this time, as BCVA is good, and to do surgery on only one eye would leave her with anisometropia. Current spectacle lenses very scratched. Recommend updating spectacles, observe. Dilates only to ~4.5 mm despite denies flomax.     Cortical cataract of both eyes - "    Hyperopia with astigmatism and presbyopia, bilateral - MRx given last visit.                     "

## 2017-10-11 ENCOUNTER — IMMUNIZATION (OUTPATIENT)
Dept: FAMILY MEDICINE | Facility: CLINIC | Age: 72
End: 2017-10-11
Payer: MEDICARE

## 2017-10-11 PROCEDURE — G0008 ADMIN INFLUENZA VIRUS VAC: HCPCS | Mod: S$GLB,,, | Performed by: INTERNAL MEDICINE

## 2017-10-11 PROCEDURE — 90662 IIV NO PRSV INCREASED AG IM: CPT | Mod: S$GLB,,, | Performed by: INTERNAL MEDICINE

## 2017-10-11 NOTE — PROGRESS NOTES
Two person identification name, d.o.b with verbal feedback.  Aseptic technique used. Administration influenza high dose vaccine on L deltoid.  Tolerated well.  VIS 8/7/15  given/mp

## 2017-10-12 DIAGNOSIS — M46.1 SACROILIITIS: Primary | ICD-10-CM

## 2017-10-12 DIAGNOSIS — M70.61 GREATER TROCHANTERIC BURSITIS OF RIGHT HIP: ICD-10-CM

## 2017-10-13 DIAGNOSIS — F51.02 TRANSIENT INSOMNIA: ICD-10-CM

## 2017-10-13 RX ORDER — TRAZODONE HYDROCHLORIDE 50 MG/1
TABLET ORAL
Qty: 30 TABLET | Refills: 11 | Status: SHIPPED | OUTPATIENT
Start: 2017-10-13 | End: 2018-07-23

## 2017-10-17 ENCOUNTER — OFFICE VISIT (OUTPATIENT)
Dept: CARDIOLOGY | Facility: CLINIC | Age: 72
End: 2017-10-17
Payer: MEDICARE

## 2017-10-17 VITALS
OXYGEN SATURATION: 100 % | SYSTOLIC BLOOD PRESSURE: 138 MMHG | HEIGHT: 65 IN | HEART RATE: 75 BPM | WEIGHT: 162.94 LBS | BODY MASS INDEX: 27.15 KG/M2 | DIASTOLIC BLOOD PRESSURE: 67 MMHG

## 2017-10-17 DIAGNOSIS — E65 ABDOMINAL OBESITY: ICD-10-CM

## 2017-10-17 DIAGNOSIS — I11.9 HYPERTENSIVE LEFT VENTRICULAR HYPERTROPHY, WITHOUT HEART FAILURE: ICD-10-CM

## 2017-10-17 DIAGNOSIS — E78.00 PURE HYPERCHOLESTEROLEMIA: ICD-10-CM

## 2017-10-17 DIAGNOSIS — Z91.89 CARDIOVASCULAR EVENT RISK: Primary | ICD-10-CM

## 2017-10-17 PROCEDURE — 99499 UNLISTED E&M SERVICE: CPT | Mod: S$PBB,,, | Performed by: INTERNAL MEDICINE

## 2017-10-17 PROCEDURE — 99214 OFFICE O/P EST MOD 30 MIN: CPT | Mod: S$GLB,,, | Performed by: INTERNAL MEDICINE

## 2017-10-17 PROCEDURE — 99999 PR PBB SHADOW E&M-EST. PATIENT-LVL II: CPT | Mod: PBBFAC,,, | Performed by: INTERNAL MEDICINE

## 2017-10-17 NOTE — PROGRESS NOTES
Subjective:    Patient ID:  Erica Masterson is a 71 y.o. female who presents for evaluation of No chief complaint on file.  For ASCVD event risk, hypercholesterolemia  PCP: Dr. Myers, see biannually  Orthopedic: Dr. Iverson  Muscle: Dr. Irving  Back: Dr. Perez  Prior cardiologist: Dr. Munguia, last seen 5/2015  GI: Dr. Britton  Pain: Yesenia Андрей  Lives with , Joel, non-smoker  [employer]    AAF with negative cardiac history, here for follow up. Denies any CP nor SOB. Active and want to be active, walk daily and stretches without problem. Enjoy playing basketball. ECG from ED was normal in 6/2016. To ED twice for nausea and undergoing GI evaluation, problem started about 2 months, no inciting factors. Chart reviewed recent labs shows CKD eGFR 47, normal CBC. Last lipid in 2/2016, .8, non- on 40 mg of atorvastatin. Baseline lipid in 2010 .2.       Chart reviewed, prior cardiac workup with stress Echo in 12/2014  exercised for 7.0 minutes on a High Ramp protocol, corresponding to a functional capacity of 12 estimated METS, achieving a peak heart rate of 160 bpm, which is 111% of the age predicted maximum heart rate.     There were no significant electrocardiographic changes throughout the protocol suggesting ischemia.     EKG Conclusions:    1. The EKG portion of this study is negative for ischemia at a high workload, and peak heart rate of 160 bpm (111% of predicted).   2. Exercise capacity is above average.   3. Blood pressure response to exercise was normal (Presenting BP: 94/52 Peak BP: 163/87).   4. No significant arrhythmias were present.   5. There were no symptoms of chest discomfort or significant dyspnea throughout the protocol.   6. The Duke treadmill score was 7 suggesting a low probability for future cardiovascular events.    ECHO CONCLUSIONS     1 - Concentric remodeling. with the septum measuring 1.1 cm and the posterior wall measuring 1.0 cm across.     2 -  Normal left ventricular systolic function (EF 60-65%).     3 - Normal left ventricular diastolic function.     4 - Normal right ventricular systolic function .     No evidence of stress induced myocardial ischemia.     In 10/2017, no new cardiac problem, some joint pains in the right hip, lower back and right shoulder, helped with PT. Anticipate injection into the right hip. Denies any CP nor SOB. Active, biking twice a day for 30 minutes each, no problem. Labs in 9/2017, essentially normal kidney function and LDL 98.2.    Review of Systems   Constitution: Negative for diaphoresis, fever, weakness, malaise/fatigue, night sweats and weight gain.   HENT: Negative for nosebleeds and tinnitus.    Eyes: Negative for visual disturbance.   Cardiovascular: Negative for chest pain, claudication, cyanosis, dyspnea on exertion, irregular heartbeat, leg swelling, near-syncope, orthopnea, palpitations and paroxysmal nocturnal dyspnea.   Respiratory: Negative for cough, shortness of breath, sleep disturbances due to breathing, snoring and wheezing.    Endocrine: Negative for polydipsia and polyuria.   Hematologic/Lymphatic: Does not bruise/bleed easily.   Skin: Negative for color change, flushing, nail changes, poor wound healing and suspicious lesions.   Musculoskeletal: Positive for arthritis and joint pain (right shoulder). Negative for falls, gout, joint swelling, muscle cramps, muscle weakness and myalgias.   Gastrointestinal: Positive for bloating, change in bowel habit and hemorrhoids. Negative for heartburn, hematemesis, hematochezia, melena and nausea.   Neurological: Negative for disturbances in coordination, excessive daytime sleepiness, dizziness, focal weakness, headaches, light-headedness, loss of balance, numbness and vertigo.   Psychiatric/Behavioral: Negative for depression and substance abuse. The patient has insomnia and is nervous/anxious.    Allergic/Immunologic: Positive for environmental allergies.  "    [place] score 6, no change       Objective:    Physical Exam   Constitutional: She is oriented to person, place, and time. She appears well-developed and well-nourished.   HENT:   Head: Normocephalic.   Eyes: Conjunctivae and EOM are normal. Pupils are equal, round, and reactive to light.   Neck: Normal range of motion. Neck supple. No JVD present. No thyromegaly present.   Circumference 15.25" down to 13"   Cardiovascular: Normal rate, regular rhythm, normal heart sounds and intact distal pulses.  Exam reveals no gallop and no friction rub.    No murmur heard.  Pulmonary/Chest: Effort normal and breath sounds normal. She has no rales. She exhibits no tenderness.   Abdominal: Soft. Bowel sounds are normal. There is no tenderness.   Waist 36.5" no change, hip 40.5"   Musculoskeletal: Normal range of motion. She exhibits no edema.   Lymphadenopathy:     She has no cervical adenopathy.   Neurological: She is alert and oriented to person, place, and time.   Skin: Skin is warm and dry. No rash noted.         Assessment:       1. Cardiovascular event risk, ASCVD 10-year risk 13%, 2010    2. Pure hypercholesterolemia    3. Abdominal obesity    4. Hypertensive left ventricular hypertrophy, without heart failure         Plan:       Erica was seen today for annual exam.    Diagnoses and all orders for this visit:    Cardiovascular event risk, ASCVD 10-year risk 13%, 2010  -     Exercise stress echo with color flow; Future    Pure hypercholesterolemia    Abdominal obesity    Hypertensive left ventricular hypertrophy, without heart failure  -     Exercise stress echo with color flow; Future      - Instruction for Mediterranean diet and heart healthy exercise given.  - Recommend at least annual cardiovascular evaluation in view of her significant risk factors.  - Phone review    Hyperlipidemia, baseline .2, 2010    Chronic kidney disease, stage III (moderate), eGFR 47, 2016 - improved    Essential hypertension - " controlled    BMI 26.0-26.9,adult - stable    Abdominal obesity  - Weigh twice weekly, try to lose 1-2 lbs per week, goal is 5%-10% loss    Patient Active Problem List   Diagnosis    Diverticulosis    Keratoconjunctivitis, unspecified    Myopathy, unspecified    Hyperlipidemia, baseline .2, 2010    Depression    Adenomatous polyp of colon    Multinodular goiter    Chronic low back pain    Degenerative arthritis of knee    Peripheral vascular disease    Hereditary and idiopathic peripheral neuropathy    Spinal enthesopathy    Lateral meniscal tear    Hypertension    PAD (peripheral artery disease)    Anxiety    GERD (gastroesophageal reflux disease)    Cardiovascular event risk, ASCVD 10-year risk 13%, 2010    BMI 26.0-26.9,adult    Abdominal obesity    Hypertensive left ventricular hypertrophy, without heart failure     Total face-to-face time with the patient was 25 minutes and greater than 50% was spent in counseling and coordination of care. The above assessment and plan have been discussed at length. Labs and procedure over the last 6 months reviewed. Problem List updated. Asked to bring in all active medications / pills bottles with next visit.

## 2017-10-19 ENCOUNTER — SURGERY (OUTPATIENT)
Age: 72
End: 2017-10-19

## 2017-10-19 ENCOUNTER — HOSPITAL ENCOUNTER (OUTPATIENT)
Facility: AMBULARY SURGERY CENTER | Age: 72
Discharge: HOME | End: 2017-10-19
Attending: ANESTHESIOLOGY | Admitting: ANESTHESIOLOGY
Payer: MEDICARE

## 2017-10-19 DIAGNOSIS — M53.3 SI (SACROILIAC) JOINT DYSFUNCTION: Primary | ICD-10-CM

## 2017-10-19 PROCEDURE — 27095 INJECTION FOR HIP X-RAY: CPT | Performed by: ANESTHESIOLOGY

## 2017-10-19 PROCEDURE — 99152 MOD SED SAME PHYS/QHP 5/>YRS: CPT | Mod: ,,, | Performed by: ANESTHESIOLOGY

## 2017-10-19 PROCEDURE — 77002 NEEDLE LOCALIZATION BY XRAY: CPT | Performed by: ANESTHESIOLOGY

## 2017-10-19 PROCEDURE — 27096 INJECT SACROILIAC JOINT: CPT | Performed by: ANESTHESIOLOGY

## 2017-10-19 PROCEDURE — 27096 INJECT SACROILIAC JOINT: CPT | Mod: 59,RT,, | Performed by: ANESTHESIOLOGY

## 2017-10-19 PROCEDURE — 20610 DRAIN/INJ JOINT/BURSA W/O US: CPT | Performed by: ANESTHESIOLOGY

## 2017-10-19 PROCEDURE — 20610 DRAIN/INJ JOINT/BURSA W/O US: CPT | Mod: 59,RT,, | Performed by: ANESTHESIOLOGY

## 2017-10-19 RX ORDER — ALPRAZOLAM 0.25 MG/1
1 TABLET ORAL ONCE AS NEEDED
Status: DISCONTINUED | OUTPATIENT
Start: 2017-10-19 | End: 2017-10-19 | Stop reason: HOSPADM

## 2017-10-19 RX ORDER — BUPIVACAINE HYDROCHLORIDE 2.5 MG/ML
INJECTION, SOLUTION EPIDURAL; INFILTRATION; INTRACAUDAL
Status: DISCONTINUED | OUTPATIENT
Start: 2017-10-19 | End: 2017-10-19 | Stop reason: HOSPADM

## 2017-10-19 RX ORDER — DEXAMETHASONE SODIUM PHOSPHATE 10 MG/ML
INJECTION INTRAMUSCULAR; INTRAVENOUS
Status: DISCONTINUED | OUTPATIENT
Start: 2017-10-19 | End: 2017-10-19 | Stop reason: HOSPADM

## 2017-10-19 RX ORDER — DEXAMETHASONE SODIUM PHOSPHATE 10 MG/ML
INJECTION INTRAMUSCULAR; INTRAVENOUS
Status: DISCONTINUED
Start: 2017-10-19 | End: 2017-10-19 | Stop reason: HOSPADM

## 2017-10-19 RX ORDER — MIDAZOLAM HYDROCHLORIDE 1 MG/ML
INJECTION INTRAMUSCULAR; INTRAVENOUS
Status: DISCONTINUED
Start: 2017-10-19 | End: 2017-10-19 | Stop reason: HOSPADM

## 2017-10-19 RX ORDER — FENTANYL CITRATE 50 UG/ML
INJECTION, SOLUTION INTRAMUSCULAR; INTRAVENOUS
Status: DISCONTINUED
Start: 2017-10-19 | End: 2017-10-19 | Stop reason: HOSPADM

## 2017-10-19 RX ORDER — FENTANYL CITRATE 50 UG/ML
INJECTION, SOLUTION INTRAMUSCULAR; INTRAVENOUS
Status: DISCONTINUED | OUTPATIENT
Start: 2017-10-19 | End: 2017-10-19 | Stop reason: HOSPADM

## 2017-10-19 RX ORDER — LIDOCAINE HYDROCHLORIDE 10 MG/ML
INJECTION, SOLUTION EPIDURAL; INFILTRATION; INTRACAUDAL; PERINEURAL
Status: DISCONTINUED | OUTPATIENT
Start: 2017-10-19 | End: 2017-10-19 | Stop reason: HOSPADM

## 2017-10-19 RX ORDER — LIDOCAINE HYDROCHLORIDE 10 MG/ML
INJECTION, SOLUTION EPIDURAL; INFILTRATION; INTRACAUDAL; PERINEURAL
Status: DISCONTINUED
Start: 2017-10-19 | End: 2017-10-19 | Stop reason: HOSPADM

## 2017-10-19 RX ORDER — SODIUM CHLORIDE, SODIUM LACTATE, POTASSIUM CHLORIDE, CALCIUM CHLORIDE 600; 310; 30; 20 MG/100ML; MG/100ML; MG/100ML; MG/100ML
INJECTION, SOLUTION INTRAVENOUS ONCE AS NEEDED
Status: COMPLETED | OUTPATIENT
Start: 2017-10-19 | End: 2017-10-19

## 2017-10-19 RX ORDER — MIDAZOLAM HYDROCHLORIDE 1 MG/ML
INJECTION INTRAMUSCULAR; INTRAVENOUS
Status: DISCONTINUED | OUTPATIENT
Start: 2017-10-19 | End: 2017-10-19 | Stop reason: HOSPADM

## 2017-10-19 RX ADMIN — DEXAMETHASONE SODIUM PHOSPHATE 10 MG: 10 INJECTION INTRAMUSCULAR; INTRAVENOUS at 02:10

## 2017-10-19 RX ADMIN — FENTANYL CITRATE 75 MCG: 50 INJECTION, SOLUTION INTRAMUSCULAR; INTRAVENOUS at 02:10

## 2017-10-19 RX ADMIN — LIDOCAINE HYDROCHLORIDE 5 ML: 10 INJECTION, SOLUTION EPIDURAL; INFILTRATION; INTRACAUDAL; PERINEURAL at 02:10

## 2017-10-19 RX ADMIN — SODIUM CHLORIDE, SODIUM LACTATE, POTASSIUM CHLORIDE, CALCIUM CHLORIDE: 600; 310; 30; 20 INJECTION, SOLUTION INTRAVENOUS at 01:10

## 2017-10-19 RX ADMIN — FENTANYL CITRATE 25 MCG: 50 INJECTION, SOLUTION INTRAMUSCULAR; INTRAVENOUS at 02:10

## 2017-10-19 RX ADMIN — MIDAZOLAM HYDROCHLORIDE 2 MG: 1 INJECTION INTRAMUSCULAR; INTRAVENOUS at 02:10

## 2017-10-19 RX ADMIN — BUPIVACAINE HYDROCHLORIDE 3 ML: 2.5 INJECTION, SOLUTION EPIDURAL; INFILTRATION; INTRACAUDAL at 02:10

## 2017-10-19 NOTE — DISCHARGE SUMMARY
[facility]  Discharge Note  Short Stay    Admit Date: 10/19/2017    Discharge Date and Time: 10/19/2017    Attending Physician: Julián Chiang MD     Discharge Provider: Julián Chiang    Diagnoses:  Active Hospital Problems    Diagnosis  POA    *SI (sacroiliac) joint dysfunction [M53.3]  Yes      Resolved Hospital Problems    Diagnosis Date Resolved POA   No resolved problems to display.       Hospital Course: SI joint injection, GTB injection  Discharged Condition: Good    Final Diagnoses:   Active Hospital Problems    Diagnosis  POA    *SI (sacroiliac) joint dysfunction [M53.3]  Yes      Resolved Hospital Problems    Diagnosis Date Resolved POA   No resolved problems to display.       Disposition: Home or Self Care    Follow up/Patient Instructions:    Medications:  Reconciled Home Medications:   Current Discharge Medication List      CONTINUE these medications which have NOT CHANGED    Details   lisinopril (PRINIVIL,ZESTRIL) 20 MG tablet Take 1 tablet (20 mg total) by mouth once daily.  Qty: 90 tablet, Refills: 3    Associated Diagnoses: Hypertension, essential      aspirin (ECOTRIN) 81 MG EC tablet Take 162 mg by mouth once daily.       atorvastatin (LIPITOR) 40 MG tablet Take 1 tablet (40 mg total) by mouth once daily.  Qty: 90 tablet, Refills: 2    Associated Diagnoses: Hyperlipidemia, unspecified hyperlipidemia type      chlorhexidine (PERIDEX) 0.12 % solution Use as directed 15 mLs in the mouth or throat once a week.       DENAVIR 1 % cream Apply topically daily as needed.       ibandronate (BONIVA) 150 mg tablet Take 1 tablet (150 mg total) by mouth every 30 days.  Qty: 1 tablet, Refills: 11    Associated Diagnoses: Osteoporosis      linaclotide (LINZESS) 145 mcg Cap capsule Take 1 capsule (145 mcg total) by mouth once daily.  Qty: 90 capsule, Refills: 3      melatonin 5 mg Subl Place 1 tablet under the tongue every evening.      povidone (SOOTHE HYDRATION) 1.25 % Drop Apply 1 drop to eye once daily.        tizanidine (ZANAFLEX) 4 MG tablet Take 4 mg by mouth 3 (three) times daily as needed.       trazodone (DESYREL) 50 MG tablet TAKE ONE TABLET BY MOUTH IN THE EVENING  Qty: 30 tablet, Refills: 11    Comments: Please consider 90 day supplies to promote better adherence  Associated Diagnoses: Transient insomnia             Discharge Procedure Orders  Diet general     Activity as tolerated     Call MD for:  temperature >100.4     Call MD for:  persistent nausea and vomiting or diarrhea     Call MD for:  severe uncontrolled pain     Call MD for:  redness, tenderness, or signs of infection (pain, swelling, redness, odor or green/yellow discharge around incision site)     Call MD for:  difficulty breathing or increased cough     Call MD for:  severe persistent headache          Follow up with MD in 2-3 weeks    Discharge Procedure Orders (must include Diet, Follow-up, Activity):    Discharge Procedure Orders (must include Diet, Follow-up, Activity)  Diet general     Activity as tolerated     Call MD for:  temperature >100.4     Call MD for:  persistent nausea and vomiting or diarrhea     Call MD for:  severe uncontrolled pain     Call MD for:  redness, tenderness, or signs of infection (pain, swelling, redness, odor or green/yellow discharge around incision site)     Call MD for:  difficulty breathing or increased cough     Call MD for:  severe persistent headache

## 2017-10-19 NOTE — OP NOTE
PROCEDURE DATE: 10/19/2017    PROCEDURE: 1. Right Greater Trochanteric Bursa injection under fluoroscopy      2. Right sacroiliac joint injection under fluoroscopy    DIAGNOSIS: Right Hip Pain; Sacroiliac joint pain  Post op diagnosis: Same    PHYSICIAN: Julián Chiang MD    MEDICATIONS INJECTED:  Dexamethasone 5mg (0.5ml) and 1.5ml 0.25% bupivicaine at each site     LOCAL ANESTHETIC INJECTED:  Lidocaine 1%. 2 ml per site.    SEDATION MEDICATIONS: RN IV sedation    ESTIMATED BLOOD LOSS:  None    COMPLICATIONS:  None    TECHNIQUE:   A time-out was taken to identify patient and procedure side prior to starting the procedure. The patient was placed in a prone position, prepped and draped in the usual sterile fashion using ChloraPrep and sterile towels.  The area to be injected was determined under fluoroscopic guidance in AP and oblique view.  Local anesthetic was given by raising a wheal and going down to the hub of a 25-gauge 1.5 inch needle.  In oblique view, a 3.5 inch 22-gauge bent-tip spinal needle was advanced and made contact with os on the greater trochanter and then withdrawn slightly.  Aspiration was negative for blood.  Above medication was injected.  The patient tolerated the procedure well.    Next, right sacroiliac joint was determined under fluoroscopy in the AP view.  Lidocaine was injected by raising a wheal and going down to the periosteum using a 25-gauge 1.5 inch needle.  The 3.5 inch 22-gauge spinal needle was introduced into inferior opening of the right sacroiliac joint.  Negative pressure applied to confirm no intravascular placement.  0.5ml of contrast dye was injected to confirm placement and to confirm that there was no vascular uptake. The medication was then injected slowly. The patient tolerated the procedure well.    The patient was monitored after the procedure.  The patient was given post procedure and discharge instructions to follow at home. The patient was discharged in a stable  condition

## 2017-10-19 NOTE — H&P (VIEW-ONLY)
Subjective:    Patient ID:  Erica Masterson is a 71 y.o. female who presents for evaluation of No chief complaint on file.  For ASCVD event risk, hypercholesterolemia  PCP: Dr. Myers, see biannually  Orthopedic: Dr. Iverson  Muscle: Dr. Irving  Back: Dr. Perez  Prior cardiologist: Dr. Munguia, last seen 5/2015  GI: Dr. Britton  Pain: Yesenia Андрей  Lives with , Joel, non-smoker  [employer]    AAF with negative cardiac history, here for follow up. Denies any CP nor SOB. Active and want to be active, walk daily and stretches without problem. Enjoy playing basketball. ECG from ED was normal in 6/2016. To ED twice for nausea and undergoing GI evaluation, problem started about 2 months, no inciting factors. Chart reviewed recent labs shows CKD eGFR 47, normal CBC. Last lipid in 2/2016, .8, non- on 40 mg of atorvastatin. Baseline lipid in 2010 .2.       Chart reviewed, prior cardiac workup with stress Echo in 12/2014  exercised for 7.0 minutes on a High Ramp protocol, corresponding to a functional capacity of 12 estimated METS, achieving a peak heart rate of 160 bpm, which is 111% of the age predicted maximum heart rate.     There were no significant electrocardiographic changes throughout the protocol suggesting ischemia.     EKG Conclusions:    1. The EKG portion of this study is negative for ischemia at a high workload, and peak heart rate of 160 bpm (111% of predicted).   2. Exercise capacity is above average.   3. Blood pressure response to exercise was normal (Presenting BP: 94/52 Peak BP: 163/87).   4. No significant arrhythmias were present.   5. There were no symptoms of chest discomfort or significant dyspnea throughout the protocol.   6. The Duke treadmill score was 7 suggesting a low probability for future cardiovascular events.    ECHO CONCLUSIONS     1 - Concentric remodeling. with the septum measuring 1.1 cm and the posterior wall measuring 1.0 cm across.     2 -  Normal left ventricular systolic function (EF 60-65%).     3 - Normal left ventricular diastolic function.     4 - Normal right ventricular systolic function .     No evidence of stress induced myocardial ischemia.     In 10/2017, no new cardiac problem, some joint pains in the right hip, lower back and right shoulder, helped with PT. Anticipate injection into the right hip. Denies any CP nor SOB. Active, biking twice a day for 30 minutes each, no problem. Labs in 9/2017, essentially normal kidney function and LDL 98.2.    Review of Systems   Constitution: Negative for diaphoresis, fever, weakness, malaise/fatigue, night sweats and weight gain.   HENT: Negative for nosebleeds and tinnitus.    Eyes: Negative for visual disturbance.   Cardiovascular: Negative for chest pain, claudication, cyanosis, dyspnea on exertion, irregular heartbeat, leg swelling, near-syncope, orthopnea, palpitations and paroxysmal nocturnal dyspnea.   Respiratory: Negative for cough, shortness of breath, sleep disturbances due to breathing, snoring and wheezing.    Endocrine: Negative for polydipsia and polyuria.   Hematologic/Lymphatic: Does not bruise/bleed easily.   Skin: Negative for color change, flushing, nail changes, poor wound healing and suspicious lesions.   Musculoskeletal: Positive for arthritis and joint pain (right shoulder). Negative for falls, gout, joint swelling, muscle cramps, muscle weakness and myalgias.   Gastrointestinal: Positive for bloating, change in bowel habit and hemorrhoids. Negative for heartburn, hematemesis, hematochezia, melena and nausea.   Neurological: Negative for disturbances in coordination, excessive daytime sleepiness, dizziness, focal weakness, headaches, light-headedness, loss of balance, numbness and vertigo.   Psychiatric/Behavioral: Negative for depression and substance abuse. The patient has insomnia and is nervous/anxious.    Allergic/Immunologic: Positive for environmental allergies.  "    [place] score 6, no change       Objective:    Physical Exam   Constitutional: She is oriented to person, place, and time. She appears well-developed and well-nourished.   HENT:   Head: Normocephalic.   Eyes: Conjunctivae and EOM are normal. Pupils are equal, round, and reactive to light.   Neck: Normal range of motion. Neck supple. No JVD present. No thyromegaly present.   Circumference 15.25" down to 13"   Cardiovascular: Normal rate, regular rhythm, normal heart sounds and intact distal pulses.  Exam reveals no gallop and no friction rub.    No murmur heard.  Pulmonary/Chest: Effort normal and breath sounds normal. She has no rales. She exhibits no tenderness.   Abdominal: Soft. Bowel sounds are normal. There is no tenderness.   Waist 36.5" no change, hip 40.5"   Musculoskeletal: Normal range of motion. She exhibits no edema.   Lymphadenopathy:     She has no cervical adenopathy.   Neurological: She is alert and oriented to person, place, and time.   Skin: Skin is warm and dry. No rash noted.         Assessment:       1. Cardiovascular event risk, ASCVD 10-year risk 13%, 2010    2. Pure hypercholesterolemia    3. Abdominal obesity    4. Hypertensive left ventricular hypertrophy, without heart failure         Plan:       Erica was seen today for annual exam.    Diagnoses and all orders for this visit:    Cardiovascular event risk, ASCVD 10-year risk 13%, 2010  -     Exercise stress echo with color flow; Future    Pure hypercholesterolemia    Abdominal obesity    Hypertensive left ventricular hypertrophy, without heart failure  -     Exercise stress echo with color flow; Future      - Instruction for Mediterranean diet and heart healthy exercise given.  - Recommend at least annual cardiovascular evaluation in view of her significant risk factors.  - Phone review    Hyperlipidemia, baseline .2, 2010    Chronic kidney disease, stage III (moderate), eGFR 47, 2016 - improved    Essential hypertension - " controlled    BMI 26.0-26.9,adult - stable    Abdominal obesity  - Weigh twice weekly, try to lose 1-2 lbs per week, goal is 5%-10% loss    Patient Active Problem List   Diagnosis    Diverticulosis    Keratoconjunctivitis, unspecified    Myopathy, unspecified    Hyperlipidemia, baseline .2, 2010    Depression    Adenomatous polyp of colon    Multinodular goiter    Chronic low back pain    Degenerative arthritis of knee    Peripheral vascular disease    Hereditary and idiopathic peripheral neuropathy    Spinal enthesopathy    Lateral meniscal tear    Hypertension    PAD (peripheral artery disease)    Anxiety    GERD (gastroesophageal reflux disease)    Cardiovascular event risk, ASCVD 10-year risk 13%, 2010    BMI 26.0-26.9,adult    Abdominal obesity    Hypertensive left ventricular hypertrophy, without heart failure     Total face-to-face time with the patient was 25 minutes and greater than 50% was spent in counseling and coordination of care. The above assessment and plan have been discussed at length. Labs and procedure over the last 6 months reviewed. Problem List updated. Asked to bring in all active medications / pills bottles with next visit.

## 2017-10-19 NOTE — DISCHARGE INSTRUCTIONS
Recovery After Procedural Sedation (Adult)  You have been given medicine by vein to make you sleep during your surgery. This may have included both a pain medicine and sleeping medicine. Most of the effects have worn off. But you may still have some drowsiness for the next 6 to 8 hours.  Home care  Follow these guidelines when you get home:  · For the next 8 hours, you should be watched by a responsible adult. This person should make sure your condition is not getting worse.  · Don't drink any alcohol for the next 24 hours.  · Don't drive, operate dangerous machinery, or make important business or personal decisions during the next 24 hours.  Note: Your healthcare provider may tell you not to take any medicine by mouth for pain or sleep in the next 4 hours. These medicines may react with the medicines you were given in the hospital. This could cause a much stronger response than usual.  Follow-up care  Follow up with your healthcare provider if you are not alert and back to your usual level of activity within 12 hours.  When to seek medical advice  Call your healthcare provider right away if any of these occur:  · Drowsiness gets worse  · Weakness or dizziness gets worse  · Repeated vomiting  · You can't be awakened   Date Last Reviewed: 10/18/2016  © 5395-5585 [employer]. [address] [place] [address], [place] [address]. All rights reserved. This information is not intended as a substitute for professional medical care. Always follow your healthcare professional's instructions.      Pain injection instructions:     Steroids take about a week to relieve pain.  Initially you may get pain relief from the local anesthetic but this may wear off before the steroid works.    No driving for 24 hrs   Activity as tolerated- gradually increase activities.  Dont lift over 10 lbs for 24 hrs   No heat at injection sites x 2 days  Use ice for mild swelling and for comfort.  May shower today. No baths for two days.       Resume Aspirin, Plavix, or Coumadin the day after the procedure unless otherwise instructed.       Seek immediate medical help for:   Severe increase in your usual pain or appearance of new pain.  Prolonged or increasing weakness or numbness in the legs or arms.    - Numbing medicine was injected that affects nerves that carry information from       muscles to brain and vice versa.  This numbness can last 4-6 hrs so be very careful walking.    Fever above 101 ,Drainage,redness,active bleeding, or increased swelling at the injection site.  Headache, shortness of breath, chest pain, or breathing problems.

## 2017-10-20 VITALS
RESPIRATION RATE: 18 BRPM | WEIGHT: 162 LBS | SYSTOLIC BLOOD PRESSURE: 118 MMHG | DIASTOLIC BLOOD PRESSURE: 65 MMHG | TEMPERATURE: 97 F | OXYGEN SATURATION: 96 % | HEIGHT: 66 IN | BODY MASS INDEX: 26.03 KG/M2 | HEART RATE: 74 BPM

## 2017-11-10 ENCOUNTER — TELEPHONE (OUTPATIENT)
Dept: PAIN MEDICINE | Facility: CLINIC | Age: 72
End: 2017-11-10

## 2017-11-10 NOTE — TELEPHONE ENCOUNTER
Pt had SI joint injection on 10/19/17. She reports her pain level at a 2 and a 60% decrease in pain.

## 2017-12-05 ENCOUNTER — TELEPHONE (OUTPATIENT)
Dept: GASTROENTEROLOGY | Facility: CLINIC | Age: 72
End: 2017-12-05

## 2017-12-05 NOTE — TELEPHONE ENCOUNTER
Patient states she is doing well did not have to take the medication and wants to cancel her appointment for Friday.

## 2017-12-05 NOTE — TELEPHONE ENCOUNTER
----- Message from Renay Ragsdale RT sent at 12/5/2017  7:10 AM CST -----  Contact: pt    pt , requesting a call back soon to give up date of her medical status on how she is feeling, thanks.

## 2017-12-22 ENCOUNTER — CLINICAL SUPPORT (OUTPATIENT)
Dept: CARDIOLOGY | Facility: CLINIC | Age: 72
End: 2017-12-22
Attending: INTERNAL MEDICINE
Payer: MEDICARE

## 2017-12-22 DIAGNOSIS — Z91.89 CARDIOVASCULAR EVENT RISK: ICD-10-CM

## 2017-12-22 DIAGNOSIS — I11.9 HYPERTENSIVE LEFT VENTRICULAR HYPERTROPHY, WITHOUT HEART FAILURE: ICD-10-CM

## 2017-12-22 LAB
DIASTOLIC DYSFUNCTION: NO
ESTIMATED PA SYSTOLIC PRESSURE: 21.32
MITRAL VALVE REGURGITATION: NORMAL
RETIRED EF AND QEF - SEE NOTES: 69 (ref 55–65)
TRICUSPID VALVE REGURGITATION: NORMAL

## 2017-12-22 PROCEDURE — 93325 DOPPLER ECHO COLOR FLOW MAPG: CPT | Mod: S$GLB,,, | Performed by: INTERNAL MEDICINE

## 2017-12-22 PROCEDURE — 93320 DOPPLER ECHO COMPLETE: CPT | Mod: S$GLB,,, | Performed by: INTERNAL MEDICINE

## 2017-12-22 PROCEDURE — 93351 STRESS TTE COMPLETE: CPT | Mod: S$GLB,,, | Performed by: INTERNAL MEDICINE

## 2017-12-26 ENCOUNTER — TELEPHONE (OUTPATIENT)
Dept: CARDIOLOGY | Facility: CLINIC | Age: 72
End: 2017-12-26

## 2018-01-14 DIAGNOSIS — E78.5 HYPERLIPIDEMIA, UNSPECIFIED HYPERLIPIDEMIA TYPE: ICD-10-CM

## 2018-01-16 RX ORDER — ATORVASTATIN CALCIUM 40 MG/1
TABLET, FILM COATED ORAL
Qty: 30 TABLET | Refills: 8 | Status: SHIPPED | OUTPATIENT
Start: 2018-01-16 | End: 2018-03-22 | Stop reason: SINTOL

## 2018-01-22 ENCOUNTER — OFFICE VISIT (OUTPATIENT)
Dept: OPHTHALMOLOGY | Facility: CLINIC | Age: 73
End: 2018-01-22
Payer: MEDICARE

## 2018-01-22 DIAGNOSIS — H52.4 HYPEROPIA WITH ASTIGMATISM AND PRESBYOPIA, BILATERAL: ICD-10-CM

## 2018-01-22 DIAGNOSIS — H25.13 NUCLEAR SCLEROSIS, BILATERAL: ICD-10-CM

## 2018-01-22 DIAGNOSIS — H52.03 HYPEROPIA WITH ASTIGMATISM AND PRESBYOPIA, BILATERAL: ICD-10-CM

## 2018-01-22 DIAGNOSIS — H26.9 CORTICAL CATARACT OF BOTH EYES: ICD-10-CM

## 2018-01-22 DIAGNOSIS — H40.003 GLAUCOMA SUSPECT, BILATERAL: Primary | ICD-10-CM

## 2018-01-22 DIAGNOSIS — H52.203 HYPEROPIA WITH ASTIGMATISM AND PRESBYOPIA, BILATERAL: ICD-10-CM

## 2018-01-22 PROCEDURE — 99999 PR PBB SHADOW E&M-EST. PATIENT-LVL III: CPT | Mod: PBBFAC,,, | Performed by: OPHTHALMOLOGY

## 2018-01-22 PROCEDURE — 76514 ECHO EXAM OF EYE THICKNESS: CPT | Mod: S$GLB,,, | Performed by: OPHTHALMOLOGY

## 2018-01-22 PROCEDURE — 92012 INTRM OPH EXAM EST PATIENT: CPT | Mod: S$GLB,,, | Performed by: OPHTHALMOLOGY

## 2018-01-22 RX ORDER — VALACYCLOVIR HYDROCHLORIDE 1 G/1
TABLET, FILM COATED ORAL
COMMUNITY
Start: 2017-12-05 | End: 2018-07-23 | Stop reason: SDUPTHER

## 2018-01-22 NOTE — PROGRESS NOTES
"HPI     Glaucoma Suspect    Additional comments: 4 month iop ck with OCT rnfl           Comments   DLS: 9/18/17    Pt states no changes in va since last visit. Eyes itch sometimes.     Gtts: Soothe QAM OU       Last edited by Cheryle Quintana on 1/22/2018 10:54 AM. (History)            Assessment /Plan     For exam results, see Encounter Report.    Glaucoma suspect, bilateral    Nuclear sclerosis, bilateral    Cortical cataract of both eyes    Hyperopia with astigmatism and presbyopia, bilateral            Glaucoma suspect, bilateral - C:D ratio appears increased from others documented in epic. Pt was followed before for glaucoma suspect by Dr. Stoddard, testing done back in 2006.    IOP WNL again today.    No known family history.    Last HRT  suggests some RNFL thinning OD.    HVF OS unreliable on 6/5/17, last test WNL OU.    OCT nerve WNL OU.   CCT slightly thin   Last gonio open to TM/thin SS OU.     RTC 4 months IOP check with DFE, HRT along with MRx and BAT.    Nuclear sclerosis, bilateral - BAT may be visually significant OS, but not OD. No longer drives after 5 pm, not comfortable driving at night, has missed a turn. BCVA is good again today, re-check MRx/BAT next visit. To do surgery on only one eye would leave her with anisometropia. Dilates only to ~4.5 mm despite denies flomax.     Cortical cataract of both eyes - "    Hyperopia with astigmatism and presbyopia, bilateral - MRx given prior visit.                       "

## 2018-01-31 ENCOUNTER — TELEPHONE (OUTPATIENT)
Dept: FAMILY MEDICINE | Facility: CLINIC | Age: 73
End: 2018-01-31

## 2018-02-01 NOTE — TELEPHONE ENCOUNTER
All readings since the end of October are good.  Note she started an exercise class in December, Keep it up.  No changes are needed.

## 2018-02-27 ENCOUNTER — TELEPHONE (OUTPATIENT)
Dept: FAMILY MEDICINE | Facility: CLINIC | Age: 73
End: 2018-02-27

## 2018-02-27 NOTE — TELEPHONE ENCOUNTER
Patient says has had constipation for years- exercises 3 days a week-eats healthy- only way she has a stool is by taking a laxative- last bm last Thursday- she thinks its the Lipitor causing constipation. She stopped the Linzess as not effective.

## 2018-02-27 NOTE — TELEPHONE ENCOUNTER
----- Message from Kia Ramsey sent at 2/27/2018  7:57 AM CST -----  Contact: Patient  Erica, [phone] until 10:15 am then cell [phone], Patient is calling about her Rx Lipitor causing constipation and she is requesting a change in medication. Please advise. Thanks.

## 2018-02-27 NOTE — TELEPHONE ENCOUNTER
Hold the Lipitor for 2 weeks and see if the problem resolves.  If it does not, it is not likely the Lipitor.

## 2018-03-13 ENCOUNTER — TELEPHONE (OUTPATIENT)
Dept: FAMILY MEDICINE | Facility: CLINIC | Age: 73
End: 2018-03-13

## 2018-03-13 DIAGNOSIS — F51.02 TRANSIENT INSOMNIA: ICD-10-CM

## 2018-03-13 NOTE — TELEPHONE ENCOUNTER
----- Message from Madelyn Walters sent at 3/13/2018  8:06 AM CDT -----  Please call pt at [phone]   / two week follow up call about medication for cholesterol

## 2018-03-13 NOTE — TELEPHONE ENCOUNTER
Patient stopped the Lipitor due to constipation- that has almost resolved- also she noticed she no longer has any stiffness or pain in muscles on legs, arms, neck or throat.

## 2018-03-22 ENCOUNTER — TELEPHONE (OUTPATIENT)
Dept: FAMILY MEDICINE | Facility: CLINIC | Age: 73
End: 2018-03-22

## 2018-03-22 DIAGNOSIS — E78.5 HYPERLIPIDEMIA, UNSPECIFIED HYPERLIPIDEMIA TYPE: Primary | ICD-10-CM

## 2018-03-22 DIAGNOSIS — Z51.81 THERAPEUTIC DRUG MONITORING: ICD-10-CM

## 2018-03-22 RX ORDER — ROSUVASTATIN CALCIUM 20 MG/1
20 TABLET, COATED ORAL NIGHTLY
Qty: 90 TABLET | Refills: 1 | Status: SHIPPED | OUTPATIENT
Start: 2018-03-22 | End: 2018-11-16 | Stop reason: SDUPTHER

## 2018-03-22 NOTE — TELEPHONE ENCOUNTER
[facility]    Start coQ-10 100mg daily now.  Wait one month to start crestor, then start it every other day for first month.  If no problems at that time go to every evening and continue coQ-10 with it.     liver panel, cpk in eight weeks, lipid panel, liver panel, cpk in six month  Standing orders in for lab

## 2018-03-22 NOTE — TELEPHONE ENCOUNTER
----- Message from Shar Haider sent at 3/22/2018  2:32 PM CDT -----  Contact: Pt  X_ 1st Request  _ 2nd Request  _ 3rd Request    Who: TODD RODRIGUEZ [[id number]]    Why: Would like for staff to mail her the instructions for the new medication    What Number to Call Back: [phone]    When to Expect a call back: (Before the end of the day)  -- if call after 3:00 call back will be tomorrow.

## 2018-03-22 NOTE — TELEPHONE ENCOUNTER
We can try crestor instead, its less prone to trouble and we can start slow and take it up over several months using coQ-10 to help prevent muscle problems.  I want to be sure all problems with the lipitor have stopped before we start crestor.

## 2018-03-22 NOTE — TELEPHONE ENCOUNTER
----- Message from Radha Shearer sent at 3/22/2018  8:09 AM CDT -----  Contact: self  Patient called asking for advice about constipation and muscle and joint pain is better. Patient needs to know she needs a different cholesterol medication.   Please call patient at [phone]. Thanks!

## 2018-03-22 NOTE — TELEPHONE ENCOUNTER
Patient stopped Lipitor due to constipation- also noticed she is not having muscle pain and aching while off med.

## 2018-04-08 DIAGNOSIS — M81.0 OSTEOPOROSIS: ICD-10-CM

## 2018-04-09 RX ORDER — IBANDRONATE SODIUM 150 MG/1
TABLET, FILM COATED ORAL
Qty: 3 TABLET | Refills: 4 | Status: SHIPPED | OUTPATIENT
Start: 2018-04-09 | End: 2019-08-30

## 2018-04-19 ENCOUNTER — TELEPHONE (OUTPATIENT)
Dept: FAMILY MEDICINE | Facility: CLINIC | Age: 73
End: 2018-04-19

## 2018-04-19 DIAGNOSIS — Z12.31 VISIT FOR SCREENING MAMMOGRAM: Primary | ICD-10-CM

## 2018-04-19 NOTE — TELEPHONE ENCOUNTER
----- Message from Becka Santoro sent at 4/19/2018  3:35 PM CDT -----  Contact: patient  Patient Erica Masterson, [phone] is calling requesting orders for her mammogram.  Please advise.  Thanks!

## 2018-05-01 ENCOUNTER — HOSPITAL ENCOUNTER (OUTPATIENT)
Dept: RADIOLOGY | Facility: CLINIC | Age: 73
Discharge: HOME | End: 2018-05-01
Attending: FAMILY MEDICINE
Payer: MEDICARE

## 2018-05-01 DIAGNOSIS — Z12.31 VISIT FOR SCREENING MAMMOGRAM: ICD-10-CM

## 2018-05-01 PROCEDURE — 77067 SCR MAMMO BI INCL CAD: CPT | Mod: TC,PO

## 2018-05-01 PROCEDURE — 77067 SCR MAMMO BI INCL CAD: CPT | Mod: 26,,, | Performed by: RADIOLOGY

## 2018-05-01 PROCEDURE — 77063 BREAST TOMOSYNTHESIS BI: CPT | Mod: 26,,, | Performed by: RADIOLOGY

## 2018-05-09 ENCOUNTER — TELEPHONE (OUTPATIENT)
Dept: FAMILY MEDICINE | Facility: CLINIC | Age: 73
End: 2018-05-09

## 2018-05-09 NOTE — TELEPHONE ENCOUNTER
----- Message from Kia Ramsey sent at 5/9/2018  9:22 AM CDT -----  Type:  Test Results    Who Called: Erica  Name of Test (Lab/Mammo/Etc):  mammogram  Date of Test:  5/1/18  Ordering Provider:  Lucia  Where the test was performed:  [facility]  Best Call Back Number:  [phone]  Additional Information: requesting a paper copy

## 2018-05-21 ENCOUNTER — OFFICE VISIT (OUTPATIENT)
Dept: OPHTHALMOLOGY | Facility: CLINIC | Age: 73
End: 2018-05-21
Payer: MEDICARE

## 2018-05-21 DIAGNOSIS — H25.13 NUCLEAR SCLEROSIS, BILATERAL: ICD-10-CM

## 2018-05-21 DIAGNOSIS — H26.9 CORTICAL CATARACT OF BOTH EYES: ICD-10-CM

## 2018-05-21 DIAGNOSIS — H52.4 HYPEROPIA WITH ASTIGMATISM AND PRESBYOPIA, BILATERAL: ICD-10-CM

## 2018-05-21 DIAGNOSIS — H52.03 HYPEROPIA WITH ASTIGMATISM AND PRESBYOPIA, BILATERAL: ICD-10-CM

## 2018-05-21 DIAGNOSIS — H40.033 ANATOMICAL NARROW ANGLE BORDERLINE GLAUCOMA OF BOTH EYES: Primary | ICD-10-CM

## 2018-05-21 DIAGNOSIS — H52.203 HYPEROPIA WITH ASTIGMATISM AND PRESBYOPIA, BILATERAL: ICD-10-CM

## 2018-05-21 DIAGNOSIS — H40.033 ANATOMICAL NARROW ANGLE OF BOTH EYES: ICD-10-CM

## 2018-05-21 PROCEDURE — 92014 COMPRE OPH EXAM EST PT 1/>: CPT | Mod: S$GLB,,, | Performed by: OPHTHALMOLOGY

## 2018-05-21 PROCEDURE — 99999 PR PBB SHADOW E&M-EST. PATIENT-LVL III: CPT | Mod: PBBFAC,,, | Performed by: OPHTHALMOLOGY

## 2018-05-21 PROCEDURE — 92133 CPTRZD OPH DX IMG PST SGM ON: CPT | Mod: S$GLB,,, | Performed by: OPHTHALMOLOGY

## 2018-05-21 NOTE — PROGRESS NOTES
"HPI     4 month IOP, MRX BAT today, HRT , denies eye pain does use artifical eye   drops daily for lubrication.     Last edited by Suze Graves on 5/21/2018 10:20 AM. (History)            Assessment /Plan     For exam results, see Encounter Report.    Anatomical narrow angle borderline glaucoma of both eyes  -     [facility] (HRT) - OU - Both Eyes; Standing    Nuclear sclerosis, bilateral    Cortical cataract of both eyes    Hyperopia with astigmatism and presbyopia, bilateral    Anatomical narrow angle of both eyes   -     [facility] (HRT) - OU - Both Eyes; Standing        Glaucoma suspect, bilateral - C:D ratio appears increased from others documented in epic. Pt was followed before for glaucoma suspect by Dr. Stoddard, testing done back in 2006.    IOP WNL again today.    No known family history.    Last HRT suggests some RNFL thinning OD; today's with poor alignment OD, OS changes in LCD and RNFL but still WNL    HVF OS unreliable on 6/5/17, last test WNL OU.    OCT nerve WNL OU.   CCT slightly thin   Last gonio open to TM/thin SS OU.     RTC 4 months IOP check, OCT nerve.    Nuclear sclerosis, bilateral - approaching visual significance, OD looks farther along than OS today. No longer drives after 5 pm, not comfortable driving at night, has missed a turn. To do surgery on only one eye would leave her with anisometropia. Dilates only to ~6.4 mm today, despite denies flomax.     Cortical cataract of both eyes - "    Hyperopia with astigmatism and presbyopia, bilateral - MRx given prior visit - not much change today.                           "

## 2018-05-22 ENCOUNTER — LAB VISIT (OUTPATIENT)
Dept: LAB | Facility: HOSPITAL | Age: 73
End: 2018-05-22
Attending: FAMILY MEDICINE
Payer: MEDICARE

## 2018-05-22 DIAGNOSIS — Z51.81 THERAPEUTIC DRUG MONITORING: ICD-10-CM

## 2018-05-22 DIAGNOSIS — E78.5 HYPERLIPIDEMIA, UNSPECIFIED HYPERLIPIDEMIA TYPE: ICD-10-CM

## 2018-05-22 LAB
ALBUMIN SERPL BCP-MCNC: 3.9 G/DL
ALP SERPL-CCNC: 46 U/L
ALT SERPL W/O P-5'-P-CCNC: 15 U/L
AST SERPL-CCNC: 20 U/L
BILIRUB DIRECT SERPL-MCNC: 0.2 MG/DL
BILIRUB SERPL-MCNC: 0.6 MG/DL
CHOLEST SERPL-MCNC: 198 MG/DL
CHOLEST/HDLC SERPL: 2.9 {RATIO}
CK SERPL-CCNC: 279 U/L
HDLC SERPL-MCNC: 69 MG/DL
HDLC SERPL: 34.8 %
LDLC SERPL CALC-MCNC: 115 MG/DL
NONHDLC SERPL-MCNC: 129 MG/DL
PROT SERPL-MCNC: 7.5 G/DL
TRIGL SERPL-MCNC: 70 MG/DL

## 2018-05-22 PROCEDURE — 82550 ASSAY OF CK (CPK): CPT

## 2018-05-22 PROCEDURE — 36415 COLL VENOUS BLD VENIPUNCTURE: CPT | Mod: PO

## 2018-05-22 PROCEDURE — 80061 LIPID PANEL: CPT

## 2018-05-22 PROCEDURE — 80076 HEPATIC FUNCTION PANEL: CPT

## 2018-05-23 ENCOUNTER — TELEPHONE (OUTPATIENT)
Dept: FAMILY MEDICINE | Facility: CLINIC | Age: 73
End: 2018-05-23

## 2018-05-23 DIAGNOSIS — E78.5 HYPERLIPIDEMIA, UNSPECIFIED HYPERLIPIDEMIA TYPE: Primary | ICD-10-CM

## 2018-05-23 NOTE — TELEPHONE ENCOUNTER
See telephone message 3/22/18- she was taking Crestor 20mg every other day for first month-just started taking it daily yesterday.

## 2018-05-23 NOTE — TELEPHONE ENCOUNTER
----- Message from Narayan Myers MD sent at 5/22/2018  6:52 PM CDT -----  The cholesterol levels are not to goal.  There is no sign of any active liver problems and no significant muscle injury related to the Crestor.  It looks like we need to increase the Crestor to 40 mg.  Has she been missing doses frequently?

## 2018-05-23 NOTE — TELEPHONE ENCOUNTER
Stay on the daily dose at 20 mg and we'll recheck the lipid panel in 3-4 months again.  Orders are in

## 2018-07-12 ENCOUNTER — TELEPHONE (OUTPATIENT)
Dept: DERMATOLOGY | Facility: CLINIC | Age: 73
End: 2018-07-12

## 2018-07-12 NOTE — TELEPHONE ENCOUNTER
----- Message from Lidya Chanel sent at 7/12/2018 10:42 AM CDT -----  Contact: patient  Type: Needs Medical Advice    Who Called:  Patient  Symptoms (please be specific):    How long has patient had these symptoms:    Pharmacy name and phone #:    Best Call Back Number: [phone]  Additional Information: requesting a call back regarding medication denafvir, and valACYclovir

## 2018-07-13 ENCOUNTER — TELEPHONE (OUTPATIENT)
Dept: DERMATOLOGY | Facility: CLINIC | Age: 73
End: 2018-07-13

## 2018-07-13 NOTE — TELEPHONE ENCOUNTER
----- Message from Nevaeh Cabrera sent at 7/12/2018  4:45 PM CDT -----  Contact: self  Type:  Patient Returning Call    Who Called: patient  Who Left Message for Patient:  nurse  Does the patient know what this is regarding?:  no  Best Call Back Number:  [phone] (home) [phone] cell     Additional Information:

## 2018-07-17 ENCOUNTER — TELEPHONE (OUTPATIENT)
Dept: DERMATOLOGY | Facility: CLINIC | Age: 73
End: 2018-07-17

## 2018-07-17 NOTE — TELEPHONE ENCOUNTER
----- Message from Janina Luz sent at 7/17/2018  7:54 AM CDT -----  Contact: pt  Pt calling states would like the nurse give her a call regarding a problem she is having also the medication she is taking,please...[phone] or [phone]

## 2018-07-23 ENCOUNTER — OFFICE VISIT (OUTPATIENT)
Dept: DERMATOLOGY | Facility: CLINIC | Age: 73
End: 2018-07-23
Payer: MEDICARE

## 2018-07-23 VITALS — HEIGHT: 65 IN | BODY MASS INDEX: 26.99 KG/M2 | WEIGHT: 162 LBS

## 2018-07-23 DIAGNOSIS — L81.1 MELASMA: Primary | ICD-10-CM

## 2018-07-23 DIAGNOSIS — Z86.19 HISTORY OF PCR DNA POSITIVE FOR HSV2: ICD-10-CM

## 2018-07-23 DIAGNOSIS — L81.8 IDIOPATHIC GUTTATE HYPOMELANOSIS: ICD-10-CM

## 2018-07-23 PROCEDURE — 99213 OFFICE O/P EST LOW 20 MIN: CPT | Mod: S$GLB,,, | Performed by: DERMATOLOGY

## 2018-07-23 PROCEDURE — 99999 PR PBB SHADOW E&M-EST. PATIENT-LVL III: CPT | Mod: PBBFAC,,, | Performed by: DERMATOLOGY

## 2018-07-23 RX ORDER — VALACYCLOVIR HYDROCHLORIDE 1 G/1
TABLET, FILM COATED ORAL
Qty: 30 TABLET | Refills: 2 | Status: SHIPPED | OUTPATIENT
Start: 2018-07-23 | End: 2019-08-30 | Stop reason: ALTCHOICE

## 2018-07-23 RX ORDER — VALACYCLOVIR HYDROCHLORIDE 1 G/1
TABLET, FILM COATED ORAL
Qty: 30 TABLET | Refills: 2 | Status: SHIPPED | OUTPATIENT
Start: 2018-07-23 | End: 2018-07-23 | Stop reason: SDUPTHER

## 2018-07-23 NOTE — PROGRESS NOTES
Subjective:       Patient ID:  Erica Masterson is a 72 y.o. female who presents for   Chief Complaint   Patient presents with    Skin Discoloration     above the lip    Spot     herpes simplex follow up, L buttock     Patient last seen 9/2016 with HSV flare, PCR proven (HSV2)  Treats flares with valtrex 1g BID x 2 days, has not needed in a while  New complaint today  C/o darker skin above the lip  No daily sun protection    Current Outpatient Prescriptions:     aspirin (ECOTRIN) 81 MG EC tablet, Take 162 mg by mouth once daily. , Disp: , Rfl:     DENAVIR 1 % cream, Apply topically daily as needed. , Disp: , Rfl:     ibandronate (BONIVA) 150 mg tablet, TAKE ONE TABLET BY MOUTH ONCE EVERY 30 DAYS, Disp: 3 tablet, Rfl: 4    lisinopril (PRINIVIL,ZESTRIL) 20 MG tablet, Take 1 tablet (20 mg total) by mouth once daily., Disp: 90 tablet, Rfl: 3    rosuvastatin (CRESTOR) 20 MG tablet, Take 1 tablet (20 mg total) by mouth every evening., Disp: 90 tablet, Rfl: 1    valACYclovir (VALTREX) 1000 MG tablet, as needed., Disp: , Rfl:             Skin Discoloration  - Initial  Affected locations: face  Duration: few months.  Signs and Symptoms: darker spots.  Severity: mild  Timing: constant  Aggravated by: nothing  Treatments tried: OTC gold bond.    Spot  - Follow-up  Diagnosis: Herpes Simplex.  Symptom course: stable  Currently using: Valtrex.  AFFECTED LOCATIONS F/U: L buttock.  Severity: mild        Review of Systems   Constitutional: Negative for fever, chills and fatigue.   Skin: Negative for activity-related sunscreen use and wears hat.   Hematologic/Lymphatic: Does not bruise/bleed easily.        Objective:    Physical Exam   Constitutional: She appears well-developed and well-nourished. No distress.   Neurological: She is alert and oriented to person, place, and time. She is not disoriented.   Psychiatric: She has a normal mood and affect.   Skin:   Areas Examined (abnormalities noted in diagram):   Head / Face  Inspection Performed  Genitals / Buttocks / Groin Inspection Performed  RLE Inspected  LLE Inspection Performed                   Diagram Legend     Erythematous scaling macule/papule c/w actinic keratosis       Vascular papule c/w angioma      Pigmented verrucoid papule/plaque c/w seborrheic keratosis      Yellow umbilicated papule c/w sebaceous hyperplasia      Irregularly shaped tan macule c/w lentigo     1-2 mm smooth white papules consistent with Milia      Movable subcutaneous cyst with punctum c/w epidermal inclusion cyst      Subcutaneous movable cyst c/w pilar cyst      Firm pink to brown papule c/w dermatofibroma      Pedunculated fleshy papule(s) c/w skin tag(s)      Evenly pigmented macule c/w junctional nevus     Mildly variegated pigmented, slightly irregular-bordered macule c/w mildly atypical nevus      Flesh colored to evenly pigmented papule c/w intradermal nevus       Pink pearly papule/plaque c/w basal cell carcinoma      Erythematous hyperkeratotic cursted plaque c/w SCC      Surgical scar with no sign of skin cancer recurrence      Open and closed comedones      Inflammatory papules and pustules      Verrucoid papule consistent consistent with wart     Erythematous eczematous patches and plaques     Dystrophic onycholytic nail with subungual debris c/w onychomycosis     Umbilicated papule    Erythematous-base heme-crusted tan verrucoid plaque consistent with inflamed seborrheic keratosis     Erythematous Silvery Scaling Plaque c/w Psoriasis     See annotation      Assessment / Plan:        Melasma  Discussed bleaching cream and strict sun protection  Patient declines bleaching     History of PCR DNA positive for HSV2  Treats falre, none recent    -     valACYclovir (VALTREX) 1000 MG tablet; One tab PO BID x 2-5 days PRN flare  Dispense: 30 tablet; Refill: 2    Idiopathic guttate hypomelanosis  BLEs, mild UEs,   Reassurance  Sun protection    Patient instructed in importance in daily sun  protection of at least spf 30. Sun avoidance and topical protection discussed.   Recommend Elta MD (physician office) COTZ sensitive (available online) for daily use on face and neck.  Patient encouraged to wear hat for all outdoor exposure.   Also discussed sun protective clothing.             No Follow-up on file.

## 2018-08-16 ENCOUNTER — LAB VISIT (OUTPATIENT)
Dept: LAB | Facility: HOSPITAL | Age: 73
End: 2018-08-16
Attending: FAMILY MEDICINE
Payer: MEDICARE

## 2018-08-16 DIAGNOSIS — Z51.81 THERAPEUTIC DRUG MONITORING: ICD-10-CM

## 2018-08-16 DIAGNOSIS — E78.5 HYPERLIPIDEMIA, UNSPECIFIED HYPERLIPIDEMIA TYPE: ICD-10-CM

## 2018-08-16 LAB
ALBUMIN SERPL BCP-MCNC: 3.7 G/DL
ALP SERPL-CCNC: 51 U/L
ALT SERPL W/O P-5'-P-CCNC: 15 U/L
AST SERPL-CCNC: 18 U/L
BILIRUB DIRECT SERPL-MCNC: 0.3 MG/DL
BILIRUB SERPL-MCNC: 0.6 MG/DL
CHOLEST SERPL-MCNC: 162 MG/DL
CHOLEST/HDLC SERPL: 2.8 {RATIO}
CK SERPL-CCNC: 342 U/L
HDLC SERPL-MCNC: 58 MG/DL
HDLC SERPL: 35.8 %
LDLC SERPL CALC-MCNC: 93 MG/DL
NONHDLC SERPL-MCNC: 104 MG/DL
PROT SERPL-MCNC: 7.1 G/DL
TRIGL SERPL-MCNC: 55 MG/DL

## 2018-08-16 PROCEDURE — 82550 ASSAY OF CK (CPK): CPT

## 2018-08-16 PROCEDURE — 80061 LIPID PANEL: CPT

## 2018-08-16 PROCEDURE — 80076 HEPATIC FUNCTION PANEL: CPT

## 2018-08-16 PROCEDURE — 36415 COLL VENOUS BLD VENIPUNCTURE: CPT | Mod: PO

## 2018-08-17 ENCOUNTER — DOCUMENTATION ONLY (OUTPATIENT)
Dept: FAMILY MEDICINE | Facility: CLINIC | Age: 73
End: 2018-08-17

## 2018-08-17 NOTE — PROGRESS NOTES
Pre-Visit Chart Review  For Appointment Scheduled on 8-21-18    Health Maintenance Due   Topic Date Due    Pneumococcal (65+) (2 of 2 - PPSV23) 01/05/2018    Influenza Vaccine  08/01/2018

## 2018-08-21 ENCOUNTER — LAB VISIT (OUTPATIENT)
Dept: LAB | Facility: HOSPITAL | Age: 73
End: 2018-08-21
Attending: FAMILY MEDICINE
Payer: MEDICARE

## 2018-08-21 ENCOUNTER — OFFICE VISIT (OUTPATIENT)
Dept: FAMILY MEDICINE | Facility: CLINIC | Age: 73
End: 2018-08-21
Attending: FAMILY MEDICINE
Payer: MEDICARE

## 2018-08-21 VITALS
TEMPERATURE: 98 F | RESPIRATION RATE: 12 BRPM | DIASTOLIC BLOOD PRESSURE: 72 MMHG | BODY MASS INDEX: 27.1 KG/M2 | HEIGHT: 66 IN | WEIGHT: 168.63 LBS | SYSTOLIC BLOOD PRESSURE: 114 MMHG | OXYGEN SATURATION: 98 % | HEART RATE: 51 BPM

## 2018-08-21 DIAGNOSIS — E78.00 PURE HYPERCHOLESTEROLEMIA: ICD-10-CM

## 2018-08-21 DIAGNOSIS — I10 GOOD HYPERTENSION CONTROL: ICD-10-CM

## 2018-08-21 DIAGNOSIS — I73.9 PERIPHERAL VASCULAR DISEASE: ICD-10-CM

## 2018-08-21 DIAGNOSIS — M46.00 SPINAL ENTHESOPATHY: ICD-10-CM

## 2018-08-21 DIAGNOSIS — I73.9 PAD (PERIPHERAL ARTERY DISEASE): ICD-10-CM

## 2018-08-21 DIAGNOSIS — Z23 IMMUNIZATION DUE: ICD-10-CM

## 2018-08-21 DIAGNOSIS — Z00.00 ENCOUNTER FOR PREVENTIVE HEALTH EXAMINATION: Primary | ICD-10-CM

## 2018-08-21 DIAGNOSIS — M53.3 SI (SACROILIAC) JOINT DYSFUNCTION: ICD-10-CM

## 2018-08-21 DIAGNOSIS — G60.9 HEREDITARY AND IDIOPATHIC PERIPHERAL NEUROPATHY: ICD-10-CM

## 2018-08-21 DIAGNOSIS — I10 HYPERTENSION, ESSENTIAL: ICD-10-CM

## 2018-08-21 LAB
ANION GAP SERPL CALC-SCNC: 7 MMOL/L
BUN SERPL-MCNC: 15 MG/DL
CALCIUM SERPL-MCNC: 9.8 MG/DL
CHLORIDE SERPL-SCNC: 105 MMOL/L
CO2 SERPL-SCNC: 28 MMOL/L
CREAT SERPL-MCNC: 1 MG/DL
EST. GFR  (AFRICAN AMERICAN): >60 ML/MIN/1.73 M^2
EST. GFR  (NON AFRICAN AMERICAN): 55.6 ML/MIN/1.73 M^2
GLUCOSE SERPL-MCNC: 95 MG/DL
POTASSIUM SERPL-SCNC: 4.5 MMOL/L
SODIUM SERPL-SCNC: 140 MMOL/L

## 2018-08-21 PROCEDURE — 99499 UNLISTED E&M SERVICE: CPT | Mod: S$GLB,,, | Performed by: FAMILY MEDICINE

## 2018-08-21 PROCEDURE — G0009 ADMIN PNEUMOCOCCAL VACCINE: HCPCS | Mod: S$GLB,,, | Performed by: FAMILY MEDICINE

## 2018-08-21 PROCEDURE — 3078F DIAST BP <80 MM HG: CPT | Mod: CPTII,S$GLB,, | Performed by: FAMILY MEDICINE

## 2018-08-21 PROCEDURE — 99214 OFFICE O/P EST MOD 30 MIN: CPT | Mod: 25,S$GLB,, | Performed by: FAMILY MEDICINE

## 2018-08-21 PROCEDURE — 80048 BASIC METABOLIC PNL TOTAL CA: CPT

## 2018-08-21 PROCEDURE — 90732 PPSV23 VACC 2 YRS+ SUBQ/IM: CPT | Mod: S$GLB,,, | Performed by: FAMILY MEDICINE

## 2018-08-21 PROCEDURE — 99999 PR PBB SHADOW E&M-EST. PATIENT-LVL IV: CPT | Mod: PBBFAC,,, | Performed by: FAMILY MEDICINE

## 2018-08-21 PROCEDURE — 36415 COLL VENOUS BLD VENIPUNCTURE: CPT | Mod: PO

## 2018-08-21 PROCEDURE — 3074F SYST BP LT 130 MM HG: CPT | Mod: CPTII,S$GLB,, | Performed by: FAMILY MEDICINE

## 2018-08-21 RX ORDER — UBIQUINOL 100 MG
1 CAPSULE ORAL DAILY
COMMUNITY
End: 2019-08-30

## 2018-08-21 RX ORDER — LISINOPRIL 20 MG/1
20 TABLET ORAL DAILY
Qty: 90 TABLET | Refills: 3 | Status: SHIPPED | OUTPATIENT
Start: 2018-08-21 | End: 2019-08-30 | Stop reason: ALTCHOICE

## 2018-08-21 RX ORDER — LISINOPRIL 20 MG/1
20 TABLET ORAL DAILY
COMMUNITY
End: 2018-08-21 | Stop reason: SDUPTHER

## 2018-08-21 NOTE — PROGRESS NOTES
Subjective:       Patient ID: Erica Masterson is a 72 y.o. female.    Chief Complaint: Annual Exam    72-year-old female coming in for physical exam.  She had most of her lab previously done with a cholesterol recheck along with a liver panel and CPK the results of which were satisfactory.  She did not get a BMP and will do that today.  She did have 1 cup of coffee.  She has been working out in exercising regularly and is feeling very well.  A lot of her chronic back pain seems to have improved with the exercise.    Past Medical History:  No date: Allergy  No date: Cataract  No date: Colon polyp  4/3/2012: Degenerative arthritis of knee  3/26/2012: Depression  No date: Diverticulosis  No date: GERD (gastroesophageal reflux disease)  No date: Hyperlipidemia  No date: Hypertension  3/26/2012: Multinodular goiter  1/18/2010: Myopathy, unspecified  No date: Tuberculosis    Past Surgical History:  2015: BREAST BIOPSY; Left      Comment:  benign  12/2/15: COLONOSCOPY      Comment:  Dr. Britton, multiple polyps, recheck five years-three                years if more than 2 polyps are adenomatous  No date: FOOT SURGERY  No date: HYSTERECTOMY  06/06/2013: KNEE SURGERY      Comment:  right knee tear Dr Iverson   1966: LUNG LOBECTOMY      Comment:  right middle lobectomy, due to Tb  No date: OOPHORECTOMY  No date: SHOULDER SURGERY      Comment:  francis     Review of patient's family history indicates:  Problem: Colon cancer      Relation: Other          Age of Onset: (Not Specified)  Problem: Cancer      Relation: Other          Age of Onset: (Not Specified)  Problem: Cancer      Relation: Mother          Age of Onset: (Not Specified)  Problem: Hypertension      Relation: Mother          Age of Onset: (Not Specified)  Problem: Cancer      Relation: Brother          Age of Onset: (Not Specified)  Problem: Hypertension      Relation: Father          Age of Onset: (Not Specified)  Problem: Diabetes      Relation: Son          Age of  Onset: (Not Specified)  Problem: Hypertension      Relation: Son          Age of Onset: (Not Specified)  Problem: Diabetes      Relation: Maternal Aunt          Age of Onset: (Not Specified)  Problem: Alzheimer's disease      Relation: Maternal Uncle          Age of Onset: (Not Specified)  Problem: Cancer      Relation: Maternal Grandmother          Age of Onset: (Not Specified)  Problem: No Known Problems      Relation: Daughter          Age of Onset: (Not Specified)  Problem: Dementia      Relation: Sister          Age of Onset: (Not Specified)  Problem: Alzheimer's disease      Relation: Sister          Age of Onset: (Not Specified)  Problem: Breast cancer      Relation: Sister          Age of Onset: 60  Problem: Obesity      Relation: Paternal Uncle          Age of Onset: (Not Specified)  Problem: Melanoma      Relation: Neg Hx          Age of Onset: (Not Specified)  Problem: Psoriasis      Relation: Neg Hx          Age of Onset: (Not Specified)  Problem: Lupus      Relation: Neg Hx          Age of Onset: (Not Specified)  Problem: Eczema      Relation: Neg Hx          Age of Onset: (Not Specified)  Problem: Amblyopia      Relation: Neg Hx          Age of Onset: (Not Specified)  Problem: Blindness      Relation: Neg Hx          Age of Onset: (Not Specified)  Problem: Cataracts      Relation: Neg Hx          Age of Onset: (Not Specified)  Problem: Glaucoma      Relation: Neg Hx          Age of Onset: (Not Specified)  Problem: Macular degeneration      Relation: Neg Hx          Age of Onset: (Not Specified)  Problem: Retinal detachment      Relation: Neg Hx          Age of Onset: (Not Specified)  Problem: Strabismus      Relation: Neg Hx          Age of Onset: (Not Specified)  Problem: Stroke      Relation: Neg Hx          Age of Onset: (Not Specified)  Problem: Thyroid disease      Relation: Neg Hx          Age of Onset: (Not Specified)    Social History    Tobacco Use      Smoking status: Former Smoker       Smokeless tobacco: Never Used      Tobacco comment: quit in 1963    Alcohol use: Yes      Comment: Occasionally    Drug use: No    Current Outpatient Medications on File Prior to Visit:  aspirin (ECOTRIN) 81 MG EC tablet, Take 162 mg by mouth once daily. , Disp: , Rfl:   carboxymethylcellulose sodium 0.25 % Drop, Place 1 drop into both eyes once daily., Disp: , Rfl:   coQ10, ubiquinol, 100 mg Cap, Take 1 capsule by mouth once daily., Disp: , Rfl:   DENAVIR 1 % cream, Apply topically daily as needed. , Disp: , Rfl:   ibandronate (BONIVA) 150 mg tablet, TAKE ONE TABLET BY MOUTH ONCE EVERY 30 DAYS, Disp: 3 tablet, Rfl: 4  rosuvastatin (CRESTOR) 20 MG tablet, Take 1 tablet (20 mg total) by mouth every evening., Disp: 90 tablet, Rfl: 1  valACYclovir (VALTREX) 1000 MG tablet, One tab PO BID x 2-5 days PRN flare, Disp: 30 tablet, Rfl: 2  (DISCONTINUED) lisinopril (PRINIVIL,ZESTRIL) 20 MG tablet, Take 20 mg by mouth once daily., Disp: , Rfl:     No current facility-administered medications on file prior to visit.     Pneumococcal (65+)(2 of 2 - PPSV23) due on 01/05/2018  Influenza Vaccine due on 08/01/2018        Review of Systems   Constitutional: Positive for activity change. Negative for chills, diaphoresis, fatigue, fever and unexpected weight change.   HENT: Negative for congestion, ear pain, hearing loss, postnasal drip and sinus pressure.    Eyes: Negative for itching and visual disturbance.   Respiratory: Negative for cough, chest tightness, shortness of breath and wheezing.    Cardiovascular: Negative for chest pain, palpitations and leg swelling.   Gastrointestinal: Negative for abdominal pain, blood in stool, constipation, diarrhea, nausea and vomiting.   Genitourinary: Negative for dysuria, frequency, hematuria, menstrual problem, pelvic pain, vaginal bleeding and vaginal discharge.   Musculoskeletal: Negative for arthralgias, back pain, joint swelling and myalgias.   Neurological: Negative for dizziness and  headaches.   Hematological: Negative for adenopathy.   Psychiatric/Behavioral: Negative for sleep disturbance. The patient is not nervous/anxious.        Objective:      Physical Exam   Constitutional: She is oriented to person, place, and time. She appears well-developed and well-nourished. No distress.   Good blood pressure control  Healthy weight with a BMI 27.6 she is up 8.4 lb from her last checkup August 16, 2017   HENT:   Head: Normocephalic and atraumatic.   Right Ear: External ear normal.   Left Ear: External ear normal.   Nose: Nose normal.   Mouth/Throat: Oropharynx is clear and moist. No oropharyngeal exudate.   Eyes: Conjunctivae and EOM are normal. Pupils are equal, round, and reactive to light. Right eye exhibits no discharge. Left eye exhibits no discharge. No scleral icterus.   Neck: Normal range of motion. Neck supple. No JVD present. No tracheal deviation present. No thyromegaly present.   Cardiovascular: Normal rate, regular rhythm and normal heart sounds. Exam reveals no gallop and no friction rub.   No murmur heard.  Carotid pulses 2+ no bruit   Pulmonary/Chest: Effort normal and breath sounds normal. No stridor. No respiratory distress. She has no wheezes. She has no rales. She exhibits no tenderness.   Abdominal: Soft. Bowel sounds are normal. She exhibits no distension and no mass. There is no tenderness. There is no rebound and no guarding.   Musculoskeletal: Normal range of motion. She exhibits no edema or tenderness.   Lymphadenopathy:     She has no cervical adenopathy.   Neurological: She is alert and oriented to person, place, and time. She has normal reflexes. She displays normal reflexes. No cranial nerve deficit or sensory deficit. She exhibits normal muscle tone.   Skin: Skin is warm and dry. No rash noted. She is not diaphoretic. No erythema.   Psychiatric: She has a normal mood and affect. Her behavior is normal. Judgment and thought content normal.   Nursing note and vitals  reviewed.      Assessment:       1. Encounter for preventive health examination    2. Good hypertension control    3. Hypertension, essential    4. Immunization due    5. Hereditary and idiopathic peripheral neuropathy    6. SI (sacroiliac) joint dysfunction    7. Pure hypercholesterolemia    8. Peripheral vascular disease    9. PAD (peripheral artery disease)    10. Spinal enthesopathy    11. BMI 27.0-27.9,adult        Plan:       1. Encounter for preventive health examination    2. Good hypertension control  No changes needed well controlled    3. Hypertension, essential  - lisinopril (PRINIVIL,ZESTRIL) 20 MG tablet; Take 1 tablet (20 mg total) by mouth once daily.  Dispense: 90 tablet; Refill: 3  - Basic metabolic panel; Future    4. Immunization due  Given  - (In Office Administered) Pneumococcal Polysaccharide Vaccine (23 Valent) (SQ/IM)    5. Hereditary and idiopathic peripheral neuropathy  Stable, relatively asymptomatic    6. SI (sacroiliac) joint dysfunction  Improved with exercise program    7. Pure hypercholesterolemia  Lab Results   Component Value Date    CHOL 162 08/16/2018    CHOL 198 05/22/2018    CHOL 167 09/12/2017     Lab Results   Component Value Date    HDL 58 08/16/2018    HDL 69 05/22/2018    HDL 58 09/12/2017     Lab Results   Component Value Date    LDLCALC 93.0 08/16/2018    LDLCALC 115.0 05/22/2018    LDLCALC 98.2 09/12/2017     Lab Results   Component Value Date    TRIG 55 08/16/2018    TRIG 70 05/22/2018    TRIG 54 09/12/2017     Lab Results   Component Value Date    CHOLHDL 35.8 08/16/2018    CHOLHDL 34.8 05/22/2018    CHOLHDL 34.7 09/12/2017         8. Peripheral vascular disease  Asymptomatic    9. PAD (peripheral artery disease)  Asymptomatic    10. Spinal enthesopathy  Asymptomatic    11. BMI 27.0-27.9,adult  Good weight no changes needed         Patient readiness: acceptance and barriers:none    During the course of the visit the patient was educated and counseled about the  following:     Hypertension:   Medication: no change.  Dietary sodium restriction.  Regular aerobic exercise.    Goals: Hypertension: Reduce Blood Pressure    Did patient meet goals/outcomes: Yes    The following self management tools provided: blood pressure log    Patient Instructions (the written plan) was given to the patient/family.     Time spent with patient: 30 minutes

## 2018-08-21 NOTE — PATIENT INSTRUCTIONS
Controlling High Blood Pressure  High blood pressure (hypertension) is often called the silent killer. This is because many people who have it dont know it. High blood pressure is defined as 140/90 mm Hg or higher. Know your blood pressure and remember to check it regularly. Doing so can save your life. Here are some things you can do to help control your blood pressure.    Choose heart-healthy foods  · Select low-salt, low-fat foods. Limit sodium intake to 2,400 mg per day or the amount suggested by your healthcare provider.  · Limit canned, dried, cured, packaged, and fast foods. These can contain a lot of salt.  · Eat 8 to 10 servings of fruits and vegetables every day.  · Choose lean meats, fish, or chicken.  · Eat whole-grain pasta, brown rice, and beans.  · Eat 2 to 3 servings of low-fat or fat-free dairy products.  · Ask your doctor about the DASH eating plan. This plan helps reduce blood pressure.  · When you go to a restaurant, ask that your meal be prepared with no added salt.  Maintain a healthy weight  · Ask your healthcare provider how many calories to eat a day. Then stick to that number.  · Ask your healthcare provider what weight range is healthiest for you. If you are overweight, a weight loss of only 3% to 5% of your body weight can help lower blood pressure. Generally, a good weight loss goal is to lose 10% of your body weight in a year.  · Limit snacks and sweets.  · Get regular exercise.  Get up and get active  · Choose activities you enjoy. Find ones you can do with friends or family. This includes bicycling, dancing, walking, and jogging.  · Park farther away from building entrances.  · Use stairs instead of the elevator.  · When you can, walk or bike instead of driving.  · [place] leaves, garden, or do household repairs.  · Be active at a moderate to vigorous level of physical activity for at least 40 minutes for a minimum of 3 to 4 days a week.   Manage stress  · Make time to relax and enjoy  life. Find time to laugh.  · Communicate your concerns with your loved ones and your healthcare provider.  · Visit with family and friends, and keep up with hobbies.  Limit alcohol and quit smoking  · Men should have no more than 2 drinks per day.  · Women should have no more than 1 drink per day.  · Talk with your healthcare provider about quitting smoking. Smoking significantly increases your risk for heart disease and stroke. Ask your healthcare provider about community smoking cessation programs and other options.  Medicines  If lifestyle changes arent enough, your healthcare provider may prescribe high blood pressure medicine. Take all medicines as prescribed. If you have any questions about your medicines, ask your healthcare provider before stopping or changing them.   Date Last Reviewed: 4/27/2016  © 6655-0833 [employer]. [address] [place] [address], [place] [address]. All rights reserved. This information is not intended as a substitute for professional medical care. Always follow your healthcare professional's instructions.

## 2018-09-11 ENCOUNTER — OFFICE VISIT (OUTPATIENT)
Dept: OPHTHALMOLOGY | Facility: CLINIC | Age: 73
End: 2018-09-11
Payer: MEDICARE

## 2018-09-11 DIAGNOSIS — H11.121 CONJUNCTIVAL CONCRETION OF RIGHT UPPER EYELID: Primary | ICD-10-CM

## 2018-09-11 DIAGNOSIS — H40.033 ANATOMICAL NARROW ANGLE BORDERLINE GLAUCOMA OF BOTH EYES: ICD-10-CM

## 2018-09-11 DIAGNOSIS — H52.4 HYPEROPIA WITH ASTIGMATISM AND PRESBYOPIA, BILATERAL: ICD-10-CM

## 2018-09-11 DIAGNOSIS — H52.203 HYPEROPIA WITH ASTIGMATISM AND PRESBYOPIA, BILATERAL: ICD-10-CM

## 2018-09-11 DIAGNOSIS — H40.033 ANATOMICAL NARROW ANGLE OF BOTH EYES: ICD-10-CM

## 2018-09-11 DIAGNOSIS — H25.13 NUCLEAR SCLEROSIS, BILATERAL: ICD-10-CM

## 2018-09-11 DIAGNOSIS — H26.9 CORTICAL CATARACT OF BOTH EYES: ICD-10-CM

## 2018-09-11 DIAGNOSIS — H52.03 HYPEROPIA WITH ASTIGMATISM AND PRESBYOPIA, BILATERAL: ICD-10-CM

## 2018-09-11 PROCEDURE — 92133 CPTRZD OPH DX IMG PST SGM ON: CPT | Mod: PBBFAC,PO | Performed by: OPHTHALMOLOGY

## 2018-09-11 PROCEDURE — 92012 INTRM OPH EXAM EST PATIENT: CPT | Mod: S$PBB,,, | Performed by: OPHTHALMOLOGY

## 2018-09-11 PROCEDURE — 99999 PR PBB SHADOW E&M-EST. PATIENT-LVL III: CPT | Mod: PBBFAC,,, | Performed by: OPHTHALMOLOGY

## 2018-09-11 PROCEDURE — 99213 OFFICE O/P EST LOW 20 MIN: CPT | Mod: PBBFAC,PO,25 | Performed by: OPHTHALMOLOGY

## 2018-09-11 RX ORDER — ERYTHROMYCIN 5 MG/G
OINTMENT OPHTHALMIC NIGHTLY
Qty: 3.5 G | Refills: 0 | Status: SHIPPED | OUTPATIENT
Start: 2018-09-11 | End: 2018-10-21

## 2018-09-11 NOTE — PROGRESS NOTES
HPI     Eye Pain      Additional comments: od              Comments     Urgent Care,    Pt c/o right eye feels tight on pain scale about a 3 started Sunday   evening. Noticed a red spot on the white of right eye yesterday evening   seems to be fading away this morning, but the discomfort remain. Denies   eye injury, fall or eye trauma. Using Art Tears OU every morning.     Agree with above. No ache, just feels tight.          Last edited by Ivory Munoz MD on 9/11/2018 11:11 AM.   (History)            Assessment /Plan     For exam results, see Encounter Report.    Conjunctival concretion of right upper eyelid    Anatomical narrow angle of both eyes   -     Posterior Segment OCT Optic Nerve- Both eyes    Anatomical narrow angle borderline glaucoma of both eyes  -     Posterior Segment OCT Optic Nerve- Both eyes    Anatomical narrow angle of both eyes  -     Posterior Segment OCT Optic Nerve- Both eyes    Nuclear sclerosis, bilateral    Cortical cataract of both eyes    Hyperopia with astigmatism and presbyopia, bilateral    Other orders  -     erythromycin (ROMYCIN) ophthalmic ointment; Place into the right eye every evening.  Dispense: 3.5 g; Refill: 0        Conjunctival concretion RUL - eroded through epithelium. Removed today with spud, Vigamox instilled before and after. EES 1-2x per day until healed.    Glaucoma suspect, bilateral - C:D ratio appears increased from others documented in epic. Pt was followed before for glaucoma suspect by Dr. Stoddard, testing done back in 2006.    IOP WNL again today.    No known family history.    Last HRT suggests some RNFL thinning OD; today's with poor alignment OD, OS changes in LCD and RNFL but still WNL    HVF OS unreliable on 6/5/17, last test WNL OU.    OCT nerve remains WNL OU.   CCT slightly thin   Last gonio open to TM/thin SS OU.     RTC 4 months IOP check, 24-2 HVF.    Nuclear sclerosis, bilateral - approaching visual significance, OD looks farther  "along than OS today. No longer drives after 5 pm, not comfortable driving at night, has missed a turn. To do surgery on only one eye would leave her with anisometropia. Dilates only to ~6.4 mm, despite denies flomax.     Cortical cataract of both eyes - "    Hyperopia with astigmatism and presbyopia, bilateral - MRx given prior visit - not much change today.                       "

## 2018-09-28 ENCOUNTER — TELEPHONE (OUTPATIENT)
Dept: OPHTHALMOLOGY | Facility: CLINIC | Age: 73
End: 2018-09-28

## 2018-09-28 NOTE — TELEPHONE ENCOUNTER
----- Message from Tyrone Reid sent at 9/28/2018  4:11 PM CDT -----  Type:  Sooner Apoointment Request    Caller is requesting a sooner appointment.  Caller declined first available appointment listed below.  Caller will not accept being placed on the waitlist and is requesting a message be sent to doctor.    Name of Caller:  Patient  When is the first available appointment?  Na  Best Call Back Number:  [phone], [phone]  Additional Information:  Patient's eyes are still hurting - not as bad but in pain. Please call to advise.

## 2018-10-11 ENCOUNTER — TELEPHONE (OUTPATIENT)
Dept: FAMILY MEDICINE | Facility: CLINIC | Age: 73
End: 2018-10-11

## 2018-10-11 NOTE — TELEPHONE ENCOUNTER
----- Message from Lesley Fournier sent at 10/10/2018  3:59 PM CDT -----    Pt  Is calling  To  Speak  To  The  Nurse  About  Her  Pneumonia  Shot// please call [phone]

## 2018-10-12 ENCOUNTER — DOCUMENTATION ONLY (OUTPATIENT)
Dept: FAMILY MEDICINE | Facility: CLINIC | Age: 73
End: 2018-10-12

## 2018-10-12 ENCOUNTER — NURSE TRIAGE (OUTPATIENT)
Dept: ADMINISTRATIVE | Facility: CLINIC | Age: 73
End: 2018-10-12

## 2018-10-12 NOTE — TELEPHONE ENCOUNTER
"Woke up a little while ago and feeling anxious, bp 144/81, pulse 103.  Sometimes it feels like she's overwhelmed, and having difficulty relaxing, jittery. Has had several episodes like this over the last few months, has mentioned it to the physician before.  In the past she just sits down and tries to relax, takes some deep breaths. Unable to schedule appt in the next 3 days, advised [facility] [place], and arranged appt on 10/15.     Reason for Disposition   [1] Symptoms of anxiety or panic AND [2] has not been evaluated for this by physician    Answer Assessment - Initial Assessment Questions  1. CONCERN: "What happened that made you call today?"      Feels jittery, anxious upon waking this am  2. ANXIETY SYMPTOM SCREENING: "Can you describe how you have been feeling?"  (e.g., tense, restless, panicky, anxious, keyed up, trouble sleeping, trouble concentrating)      Jittery, feels like she can't keep her emotions contained, awakened from sleep  3. ONSET: "How long have you been feeling this way?"      This am, but several episodes over the last few months  4. RECURRENT: "Have you felt this way before?"  If yes: "What happened that time?" "What helped these feelings go away in the past?"       Yes, see above.  She uses relaxation techniques, deep breaths  5. RISK OF HARM - SUICIDAL IDEATION:  "Do you ever have thoughts of hurting or killing yourself?"  (e.g., yes, no, no but preoccupation with thoughts about death)    - INTENT:  "Do you have thoughts of hurting or killing yourself right NOW?" (e.g., yes, no, N/A)    - PLAN: "Do you have a specific plan for how you would do this?" (e.g., gun, knife, overdose, no plan, N/A)      no  6. RISK OF HARM - HOMICIDAL IDEATION:  "Do you ever have thoughts of hurting or killing someone else?"  (e.g., yes, no, no but preoccupation with thoughts about death)    - INTENT:  "Do you have thoughts of hurting or killing someone right NOW?" (e.g., yes, no, N/A)    - PLAN: "Do you have a specific " "plan for how you would do this?" (e.g., gun, knife, no plan, N/A)       no  7. FUNCTIONAL IMPAIRMENT: "How have things been going for you overall in your life? Have you had any more difficulties than usual doing your normal daily activities?"  (e.g., better, same, worse; self-care, school, work, interactions)      no  8. SUPPORT: "Who is with you now?" "Who do you live with?" "Do you have family or friends nearby who you can talk to?"       She is alone right now.  9. THERAPIST: "Do you have a counselor or therapist? Name?"      no  10. STRESSORS: "Has there been any new stress or recent changes in your life?"        Painting going on in the house now.  11. CAFFEINE ABUSE: "Do you drink caffeinated beverages, and how much each day?" (e.g., coffee, tea, marcello)        2-3 cups daily, but lately has been staying away from it, usually drinks decaf tea  12. SUBSTANCE ABUSE: "Do you use any illegal drugs or alcohol?"        no  13. OTHER SYMPTOMS: "Do you have any other physical symptoms right now?" (e.g., chest pain, palpitations, difficulty breathing, fever)        no  14. PREGNANCY: "Is there any chance you are pregnant?" "When was your last menstrual period?"        na    Protocols used: ST ANXIETY AND PANIC ATTACK-A-AH      "

## 2018-10-12 NOTE — PROGRESS NOTES
Pre-Visit Chart Review  For Appointment Scheduled on 10/15/18    Health Maintenance Due   Topic Date Due    Influenza Vaccine  08/01/2018

## 2018-10-15 ENCOUNTER — OFFICE VISIT (OUTPATIENT)
Dept: FAMILY MEDICINE | Facility: CLINIC | Age: 73
End: 2018-10-15
Payer: MEDICARE

## 2018-10-15 VITALS
WEIGHT: 168 LBS | DIASTOLIC BLOOD PRESSURE: 68 MMHG | HEART RATE: 69 BPM | HEIGHT: 66 IN | SYSTOLIC BLOOD PRESSURE: 141 MMHG | TEMPERATURE: 98 F | BODY MASS INDEX: 27 KG/M2

## 2018-10-15 DIAGNOSIS — Z23 FLU VACCINE NEED: ICD-10-CM

## 2018-10-15 DIAGNOSIS — I10 ESSENTIAL HYPERTENSION: ICD-10-CM

## 2018-10-15 DIAGNOSIS — F41.1 GAD (GENERALIZED ANXIETY DISORDER): Primary | ICD-10-CM

## 2018-10-15 PROCEDURE — 3078F DIAST BP <80 MM HG: CPT | Mod: CPTII,,, | Performed by: PHYSICIAN ASSISTANT

## 2018-10-15 PROCEDURE — 99999 PR PBB SHADOW E&M-EST. PATIENT-LVL IV: CPT | Mod: PBBFAC,,, | Performed by: PHYSICIAN ASSISTANT

## 2018-10-15 PROCEDURE — 99214 OFFICE O/P EST MOD 30 MIN: CPT | Mod: PBBFAC,PO | Performed by: PHYSICIAN ASSISTANT

## 2018-10-15 PROCEDURE — 99214 OFFICE O/P EST MOD 30 MIN: CPT | Mod: S$PBB,,, | Performed by: PHYSICIAN ASSISTANT

## 2018-10-15 PROCEDURE — 1101F PT FALLS ASSESS-DOCD LE1/YR: CPT | Mod: CPTII,,, | Performed by: PHYSICIAN ASSISTANT

## 2018-10-15 PROCEDURE — 3077F SYST BP >= 140 MM HG: CPT | Mod: CPTII,,, | Performed by: PHYSICIAN ASSISTANT

## 2018-10-15 PROCEDURE — 90662 IIV NO PRSV INCREASED AG IM: CPT | Mod: PBBFAC,PO

## 2018-10-15 RX ORDER — ESCITALOPRAM OXALATE 5 MG/1
5 TABLET ORAL NIGHTLY
Qty: 30 TABLET | Refills: 1 | Status: SHIPPED | OUTPATIENT
Start: 2018-10-15 | End: 2019-08-30

## 2018-10-15 RX ORDER — TRAZODONE HYDROCHLORIDE 50 MG/1
TABLET ORAL
COMMUNITY
Start: 2018-10-07 | End: 2018-10-15

## 2018-10-15 NOTE — PROGRESS NOTES
Subjective:       Patient ID: Erica Masterson is a 72 y.o. female.    Chief Complaint: Anxiety (panic attack, started thursday  night and early in the morning)    Mrs. Masterson is a 72 year old female who presents to clinic for evaluation of anxiety. She is a new patient to me. She has history of hypertension, HLP, LVH, GERD, arthritis, and depression and anxiety. She states last week she had an episode when she woke up in the middle of the night and felt like she was having a panic attack. She states symptoms were accompanied by jittery feeling, palpitations, mind racing that lasted for nearly 30 minutes. She has been having trouble sleeping since that time. She states that her symptoms do not wake her from sleep, but occur after she is already awake. She complains of difficulty concentrating, feeling easily annoyed/irritability when her responsibilites get to be too much, and feeling like she can't turn off her brain. She has suffered with these symptoms intermittently for several years. She has been to ER on 3 separate occasions and per pt, work up was always reassuring and sx were thought to be related to anxiety. Last documented episode in the chart 2016. Of note, she did undergo stress echo in 2017 that was normal. She states her vitals last week when she woke up were 130/76 with HR 80. She has never been treated with medication before for anxiety. RONNIE 7 screening tool consistent with mild anxiety. She denies sadness, hopelessness, or anhedonia.      Review of Systems   Constitutional: Negative for activity change, appetite change, chills, fatigue and fever.   Eyes: Negative for visual disturbance.   Respiratory: Negative for cough and shortness of breath.    Cardiovascular: Positive for palpitations. Negative for chest pain and leg swelling.   Gastrointestinal: Negative for abdominal pain, constipation, diarrhea, nausea and vomiting.   Musculoskeletal: Negative for arthralgias.   Neurological: Negative for  dizziness, weakness, light-headedness and headaches.   Psychiatric/Behavioral: Positive for decreased concentration and sleep disturbance. Negative for dysphoric mood, self-injury and suicidal ideas. The patient is nervous/anxious.        Objective:      Vitals:    10/15/18 0818   BP: (!) 141/68   Pulse: 69   Temp: 98 °F (36.7 °C)     Physical Exam   Constitutional: She is oriented to person, place, and time. She appears well-developed and well-nourished.   HENT:   Head: Normocephalic and atraumatic.   Eyes: Conjunctivae and EOM are normal. Pupils are equal, round, and reactive to light.   Cardiovascular: Normal rate, regular rhythm, normal heart sounds and intact distal pulses.   Pulmonary/Chest: Effort normal and breath sounds normal.   Neurological: She is alert and oriented to person, place, and time.   Skin: Skin is warm and dry.   Psychiatric: Her speech is normal and behavior is normal. Her mood appears anxious.   Vitals reviewed.      Assessment:       1. RONNIE (generalized anxiety disorder)    2. Essential hypertension    3. Flu vaccine need        Plan:       RONNIE (generalized anxiety disorder)  -     escitalopram oxalate (LEXAPRO) 5 MG Tab; Take 1 tablet (5 mg total) by mouth nightly.  Dispense: 30 tablet; Refill: 1  - I discussed with the patient the risks, side effects and the benefits of the medication including the black box warning regarding suicidal ideation/risk if applicable.  I counseled the patient on medication titration, length of time before maximum benefits are reached, and duration of treatment expected.  I advised the patient to return to clinic or go to the emergency department if suicidal thoughts occur, thought of hurting others, hallucinations, or other serious symptoms.  Patient voiced no intention of self-harm.  The patient expressed verbal understanding and elected to proceed with treatment.  All questions were answered.    - Phone number provided for pt to seek counseling.      Essential hypertension       - Slightly elevated today, continue current medications. Reassess BP at f/u visit in 2 weeks. BP is typically well controlled.     Flu vaccine need  -     Influenza - High Dose (65+) (PF) (IM)    Patient readiness: acceptance and barriers:none    During the course of the visit the patient was educated and counseled about the following:     Hypertension:   Medication: no change.    Goals: Hypertension: Reduce Blood Pressure    Did patient meet goals/outcomes: No    The following self management tools provided: declined    Patient Instructions (the written plan) was given to the patient/family.     Time spent with patient: 15 minutes     Follow up in 2 weeks with VIKA Villanueva

## 2018-10-21 ENCOUNTER — HOSPITAL ENCOUNTER (EMERGENCY)
Facility: HOSPITAL | Age: 73
Discharge: HOME | End: 2018-10-21
Attending: EMERGENCY MEDICINE
Payer: MEDICARE

## 2018-10-21 VITALS
WEIGHT: 168 LBS | SYSTOLIC BLOOD PRESSURE: 172 MMHG | HEART RATE: 56 BPM | OXYGEN SATURATION: 98 % | BODY MASS INDEX: 27 KG/M2 | DIASTOLIC BLOOD PRESSURE: 80 MMHG | HEIGHT: 66 IN | TEMPERATURE: 98 F | RESPIRATION RATE: 18 BRPM

## 2018-10-21 DIAGNOSIS — R42 ORTHOSTATIC LIGHTHEADEDNESS: Primary | ICD-10-CM

## 2018-10-21 LAB
ALBUMIN SERPL BCP-MCNC: 4 G/DL
ALP SERPL-CCNC: 51 U/L
ALT SERPL W/O P-5'-P-CCNC: 19 U/L
ANION GAP SERPL CALC-SCNC: 8 MMOL/L
AST SERPL-CCNC: 19 U/L
BACTERIA #/AREA URNS HPF: NORMAL /HPF
BASOPHILS # BLD AUTO: 0 K/UL
BASOPHILS NFR BLD: 0.4 %
BILIRUB SERPL-MCNC: 0.5 MG/DL
BILIRUB UR QL STRIP: NEGATIVE
BUN SERPL-MCNC: 18 MG/DL
CALCIUM SERPL-MCNC: 9.7 MG/DL
CHLORIDE SERPL-SCNC: 105 MMOL/L
CLARITY UR: CLEAR
CO2 SERPL-SCNC: 27 MMOL/L
COLOR UR: YELLOW
CREAT SERPL-MCNC: 1 MG/DL
DIFFERENTIAL METHOD: ABNORMAL
EOSINOPHIL # BLD AUTO: 0.2 K/UL
EOSINOPHIL NFR BLD: 3.4 %
ERYTHROCYTE [DISTWIDTH] IN BLOOD BY AUTOMATED COUNT: 13.2 %
EST. GFR  (AFRICAN AMERICAN): >60 ML/MIN/1.73 M^2
EST. GFR  (NON AFRICAN AMERICAN): 55 ML/MIN/1.73 M^2
GLUCOSE SERPL-MCNC: 121 MG/DL
GLUCOSE UR QL STRIP: NEGATIVE
HCT VFR BLD AUTO: 38.4 %
HGB BLD-MCNC: 12.4 G/DL
HGB UR QL STRIP: ABNORMAL
KETONES UR QL STRIP: NEGATIVE
LEUKOCYTE ESTERASE UR QL STRIP: ABNORMAL
LYMPHOCYTES # BLD AUTO: 1.4 K/UL
LYMPHOCYTES NFR BLD: 30.9 %
MCH RBC QN AUTO: 28.8 PG
MCHC RBC AUTO-ENTMCNC: 32.3 G/DL
MCV RBC AUTO: 89 FL
MICROSCOPIC COMMENT: NORMAL
MONOCYTES # BLD AUTO: 0.3 K/UL
MONOCYTES NFR BLD: 6.7 %
NEUTROPHILS # BLD AUTO: 2.6 K/UL
NEUTROPHILS NFR BLD: 58.6 %
NITRITE UR QL STRIP: NEGATIVE
PH UR STRIP: 6 [PH] (ref 5–8)
PLATELET # BLD AUTO: 309 K/UL
PMV BLD AUTO: 7.7 FL
POTASSIUM SERPL-SCNC: 3.9 MMOL/L
PROT SERPL-MCNC: 7.7 G/DL
PROT UR QL STRIP: NEGATIVE
RBC # BLD AUTO: 4.3 M/UL
RBC #/AREA URNS HPF: 2 /HPF (ref 0–4)
SODIUM SERPL-SCNC: 140 MMOL/L
SP GR UR STRIP: 1.02 (ref 1–1.03)
SQUAMOUS #/AREA URNS HPF: 1 /HPF
URN SPEC COLLECT METH UR: ABNORMAL
UROBILINOGEN UR STRIP-ACNC: NEGATIVE EU/DL
WBC # BLD AUTO: 4.5 K/UL
WBC #/AREA URNS HPF: 2 /HPF (ref 0–5)

## 2018-10-21 PROCEDURE — 99285 EMERGENCY DEPT VISIT HI MDM: CPT | Mod: 25

## 2018-10-21 PROCEDURE — 93010 ELECTROCARDIOGRAM REPORT: CPT | Mod: ,,, | Performed by: INTERNAL MEDICINE

## 2018-10-21 PROCEDURE — 81000 URINALYSIS NONAUTO W/SCOPE: CPT

## 2018-10-21 PROCEDURE — 85025 COMPLETE CBC W/AUTO DIFF WBC: CPT

## 2018-10-21 PROCEDURE — 80053 COMPREHEN METABOLIC PANEL: CPT

## 2018-10-21 PROCEDURE — 93005 ELECTROCARDIOGRAM TRACING: CPT

## 2018-10-21 PROCEDURE — 36415 COLL VENOUS BLD VENIPUNCTURE: CPT

## 2018-10-21 NOTE — ED NOTES
Assumed care:  Patient awake, alert and oriented x 3, skin warm and dry, in NAD with family at bedside.    Patient identifiers for Erica Masterson checked and correct.  LOC:  Patient is awake, alert, and aware of environment with an appropriate affect. Patient is oriented x 3 and speaking appropriately.  APPEARANCE:  Patient resting comfortably and in no acute distress. Patient is clean and well groomed, patient's clothing is properly fastened.  SKIN:  The skin is warm and dry. Patient has normal skin turgor and moist mucus membranes. Skin is intact; no bruising or breakdown noted.  MUSCULOSKELETAL:  Patient is moving all extremities well, no obvious deformities noted. Pulses intact.   RESPIRATORY:  Airway is open and patent. Respirations are spontaneous and non-labored with normal effort and rate.  CARDIAC:  Patient has a normal rate and rhythm. No peripheral edema noted. Capillary refill < 3 seconds.  ABDOMEN:  No distention noted.  Soft and non-tender upon palpation.  NEUROLOGICAL:  PERRL. Facial expression is symmetrical. Hand grasps are equal bilaterally. Normal sensation in all extremities when touched with finger.  Allergies reported:    Review of patient's allergies indicates:   Allergen Reactions    No known drug allergies      OTHER NOTES:  Patient states that she has had dizziness and weakness off and on x several weeks.

## 2018-10-21 NOTE — ED PROVIDER NOTES
"Encounter Date: 10/21/2018    SCRIBE #1 NOTE: IDafne, am scribing for, and in the presence of, Dr. Eitan nOeil.       History     Chief Complaint   Patient presents with    Dizziness     and weakness and not feeling well       Time seen by provider: 7:13 AM on 10/21/2018    Erica Masterson is a 73 y.o. female with PMHx of tuberculosis, HLD, myopathy, and HTN who presents to the ED accompanied by her spouse with complaints of worsening lightheadedness that started this morning. Patient states lightheadedness "comes and goes" and initially started "a few days ago". Lightheadedness worsens when standing after laying down or sitting. She endorses visiting her PCP and cardiologist within this past week but reports having no abnormal labs. Patient denies chest pain but reports having a "fast heart beat sometimes". She denies diarrhea and use of diuretics. She denies onset of any abnormal urinary symptoms and onset of any other new symptoms. Patient has no other medical concerns or complaints at this moment. SHx includes hysterectomy, lung lobectomy, and colonoscopy.       The history is provided by the patient.     Review of patient's allergies indicates:   Allergen Reactions    No known drug allergies      Past Medical History:   Diagnosis Date    Allergy     Cataract     Colon polyp     Degenerative arthritis of knee 4/3/2012    Depression 3/26/2012    Diverticulosis     GERD (gastroesophageal reflux disease)     Hyperlipidemia     Hypertension     Multinodular goiter 3/26/2012    Myopathy, unspecified 1/18/2010    Tuberculosis      Past Surgical History:   Procedure Laterality Date    ARTHROSCOPY, KNEE Right 6/6/2013    [facility]    BLOCK- BRANCH- SACROILIAC Right 10/19/2017    [facility]    BREAST BIOPSY Left 2015    benign    COLONOSCOPY  12/2/15    Dr. Britton, multiple polyps, recheck five years-three years if more than " 2 polyps are adenomatous    COLONOSCOPY N/A 12/2/2015    COLONOSCOPY N/A 12/2/2015    [facility]    ESOPHAGOGASTRODUODENOSCOPY (EGD) N/A 6/29/2016    [facility]    FOOT SURGERY      HYSTERECTOMY      INJECTION-JOINT Right 10/19/2017    [facility]    KNEE SURGERY  06/06/2013    right knee tear Dr Iverson     LUNG LOBECTOMY  1966    right middle lobectomy, due to Tb    OOPHORECTOMY      SHOULDER SURGERY      francis      Family History   Problem Relation Age of Onset    Colon cancer Other     Cancer Other     Cancer Mother     Hypertension Mother     Cancer Brother     Hypertension Father     Diabetes Son     Hypertension Son     Diabetes Maternal Aunt     Alzheimer's disease Maternal Uncle     Cancer Maternal Grandmother     No Known Problems Daughter     Dementia Sister     Alzheimer's disease Sister     Breast cancer Sister 60    Obesity Paternal Uncle     Melanoma Neg Hx     Psoriasis Neg Hx     Lupus Neg Hx     Eczema Neg Hx     Amblyopia Neg Hx     Blindness Neg Hx     Cataracts Neg Hx     Glaucoma Neg Hx     Macular degeneration Neg Hx     Retinal detachment Neg Hx     Strabismus Neg Hx     Stroke Neg Hx     Thyroid disease Neg Hx      Social History     Tobacco Use    Smoking status: Former Smoker    Smokeless tobacco: Never Used    Tobacco comment: quit in 1963   Substance Use Topics    Alcohol use: Yes     Comment: Occasionally    Drug use: No     Review of Systems   Constitutional: Negative for chills and fever.   HENT: Negative for facial swelling and trouble swallowing.    Respiratory: Negative for cough, chest tightness, shortness of breath and wheezing.    Cardiovascular: Positive for palpitations (intermittent). Negative for chest pain.   Gastrointestinal: Negative for abdominal pain, diarrhea, nausea and vomiting.   Genitourinary: Negative for decreased urine  volume, difficulty urinating, dysuria, frequency, hematuria and urgency.   Musculoskeletal: Negative for arthralgias, back pain, joint swelling, myalgias, neck pain and neck stiffness.   Skin: Negative for color change, pallor, rash and wound.   Neurological: Positive for light-headedness (intermittent; worsens with standing). Negative for dizziness, syncope, weakness, numbness and headaches.   Hematological: Does not bruise/bleed easily.   Psychiatric/Behavioral: The patient is not nervous/anxious.        Physical Exam     Initial Vitals [10/21/18 0629]   BP Pulse Resp Temp SpO2   (!) 199/105 62 18 98 °F (36.7 °C) 98 %      MAP       --         Physical Exam    Nursing note and vitals reviewed.  Constitutional: She appears well-developed and well-nourished. She is not diaphoretic.  Non-toxic appearance. She does not have a sickly appearance. She does not appear ill. No distress.   HENT:   Head: Normocephalic and atraumatic.   Eyes: EOM are normal. Pupils are equal, round, and reactive to light.   Neck: Normal range of motion. Neck supple. No neck rigidity. No JVD present.   Cardiovascular: Normal rate, regular rhythm, normal heart sounds and intact distal pulses. Exam reveals no gallop and no friction rub.    No murmur heard.  Pulses:       Radial pulses are 2+ on the right side, and 2+ on the left side.        Dorsalis pedis pulses are 2+ on the right side, and 2+ on the left side.        Posterior tibial pulses are 2+ on the right side, and 2+ on the left side.   Pulmonary/Chest: Breath sounds normal. She has no wheezes. She has no rhonchi. She has no rales.   Abdominal: Soft. Bowel sounds are normal. She exhibits no distension. There is no tenderness. There is no rigidity, no rebound and no guarding.   Musculoskeletal: Normal range of motion. She exhibits no edema or tenderness.   No peripheral edema noted.   Neurological: She is alert and oriented to person, place, and time. She has normal strength and normal  reflexes. No cranial nerve deficit or sensory deficit. She exhibits normal muscle tone. Coordination normal. GCS eye subscore is 4. GCS verbal subscore is 5. GCS motor subscore is 6.   Skin: Skin is warm and dry. Capillary refill takes less than 2 seconds.   Psychiatric: She has a normal mood and affect. Her speech is normal and behavior is normal. She is not actively hallucinating.         ED Course   Procedures  Labs Reviewed   CBC W/ AUTO DIFFERENTIAL - Abnormal; Notable for the following components:       Result Value    MPV 7.7 (*)     All other components within normal limits   COMPREHENSIVE METABOLIC PANEL - Abnormal; Notable for the following components:    Glucose 121 (*)     Alkaline Phosphatase 51 (*)     eGFR if non  55 (*)     All other components within normal limits   URINALYSIS, REFLEX TO URINE CULTURE - Abnormal; Notable for the following components:    Occult Blood UA 1+ (*)     Leukocytes, UA Trace (*)     All other components within normal limits    Narrative:     Preferred Collection Type->Urine, Clean Catch   URINALYSIS MICROSCOPIC    Narrative:     Preferred Collection Type->Urine, Clean Catch     EKG Readings: (Independently Interpreted)   Initial Reading: No STEMI. Rhythm: Sinus Bradycardia. Heart Rate: 57 BPM. ST Segments: Normal ST Segments. Axis: Normal.   Normal intervals.        Imaging Results    None          Medical Decision Making:   History:   Old Medical Records: I decided to obtain old medical records.  Initial Assessment:   73-year-old woman presents to the emergency department for evaluation of chronic lightheadedness.  States she has seen primary care and cardiologist without etiology to her lightheadedness.  No syncopal episodes.  No chest pain or shortness of breath. No fever.  No bloody bowel movements.  She is not anemic, she has no evidence of decompensated heart failure.  I do not suspect infectious etiology.  She is not orthostatic.  No arrhythmias.   Workup in the emergency department is unremarkable. Patient provided with reassurance.  She is to follow up with her PCP/cardiologist for further evaluation.  Return precautions discussed.  Discharged in no acute distress.  Clinical Tests:   Lab Tests: Ordered and Reviewed  Medical Tests: Ordered and Reviewed            Scribe Attestation:   Scribe #1: I performed the above scribed service and the documentation accurately describes the services I performed. I attest to the accuracy of the note.      I, Thad Simpson, personally performed the services described in this documentation. All medical record entries made by the scribe were at my direction and in my presence.  I have reviewed the chart and agree that the record reflects my personal performance and is accurate and complete. Eitan Oneil MD.  1:51 PM 10/21/2018               Clinical Impression:   The encounter diagnosis was Orthostatic lightheadedness.      Disposition:   Disposition: Discharged                        Eitan Oneil MD  10/21/18 1352

## 2018-10-22 ENCOUNTER — TELEPHONE (OUTPATIENT)
Dept: FAMILY MEDICINE | Facility: CLINIC | Age: 73
End: 2018-10-22

## 2018-10-22 ENCOUNTER — DOCUMENTATION ONLY (OUTPATIENT)
Dept: FAMILY MEDICINE | Facility: CLINIC | Age: 73
End: 2018-10-22

## 2018-10-22 NOTE — TELEPHONE ENCOUNTER
----- Message from Radha Shearer sent at 10/22/2018  8:39 AM CDT -----  Contact: self  Type:  Sooner Apoointment Request    Caller is requesting a sooner appointment.  Caller declined first available appointment listed below.  Caller will not accept being placed on the waitlist and is requesting a message be sent to doctor.    Name of Caller:  self  When is the first available appointment?  11/23/18  Symptoms:  Went to [facility] on 10/21/18 dx light headedness and dizziness  Best Call Back Number:  [phone]  Additional Information:  Patient was ask to follow up in 3 days. Please call patient. Thanks!

## 2018-10-24 ENCOUNTER — OFFICE VISIT (OUTPATIENT)
Dept: FAMILY MEDICINE | Facility: CLINIC | Age: 73
End: 2018-10-24
Attending: FAMILY MEDICINE
Payer: MEDICARE

## 2018-10-24 ENCOUNTER — LAB VISIT (OUTPATIENT)
Dept: LAB | Facility: HOSPITAL | Age: 73
End: 2018-10-24
Attending: FAMILY MEDICINE
Payer: MEDICARE

## 2018-10-24 VITALS
RESPIRATION RATE: 18 BRPM | DIASTOLIC BLOOD PRESSURE: 66 MMHG | TEMPERATURE: 98 F | BODY MASS INDEX: 26.96 KG/M2 | HEART RATE: 40 BPM | SYSTOLIC BLOOD PRESSURE: 132 MMHG | WEIGHT: 167.75 LBS | HEIGHT: 66 IN

## 2018-10-24 DIAGNOSIS — R00.1 BRADYCARDIA: Primary | ICD-10-CM

## 2018-10-24 DIAGNOSIS — E04.9 GOITER, NODULAR: ICD-10-CM

## 2018-10-24 DIAGNOSIS — I49.5 TACHYCARDIA-BRADYCARDIA SYNDROME: ICD-10-CM

## 2018-10-24 DIAGNOSIS — R00.1 BRADYCARDIA: ICD-10-CM

## 2018-10-24 LAB
MAGNESIUM SERPL-MCNC: 2.2 MG/DL
T3FREE SERPL-MCNC: 2.3 PG/ML
T4 FREE SERPL-MCNC: 0.89 NG/DL
TSH SERPL DL<=0.005 MIU/L-ACNC: 2.05 UIU/ML

## 2018-10-24 PROCEDURE — 36415 COLL VENOUS BLD VENIPUNCTURE: CPT | Mod: PO

## 2018-10-24 PROCEDURE — 83735 ASSAY OF MAGNESIUM: CPT

## 2018-10-24 PROCEDURE — 84443 ASSAY THYROID STIM HORMONE: CPT

## 2018-10-24 PROCEDURE — 99999 PR PBB SHADOW E&M-EST. PATIENT-LVL IV: CPT | Mod: PBBFAC,,, | Performed by: FAMILY MEDICINE

## 2018-10-24 PROCEDURE — 84439 ASSAY OF FREE THYROXINE: CPT

## 2018-10-24 PROCEDURE — 99499 UNLISTED E&M SERVICE: CPT | Mod: S$GLB,,, | Performed by: FAMILY MEDICINE

## 2018-10-24 PROCEDURE — 1101F PT FALLS ASSESS-DOCD LE1/YR: CPT | Mod: CPTII,,, | Performed by: FAMILY MEDICINE

## 2018-10-24 PROCEDURE — 3075F SYST BP GE 130 - 139MM HG: CPT | Mod: CPTII,,, | Performed by: FAMILY MEDICINE

## 2018-10-24 PROCEDURE — 99214 OFFICE O/P EST MOD 30 MIN: CPT | Mod: S$PBB,,, | Performed by: FAMILY MEDICINE

## 2018-10-24 PROCEDURE — 3078F DIAST BP <80 MM HG: CPT | Mod: CPTII,,, | Performed by: FAMILY MEDICINE

## 2018-10-24 PROCEDURE — 84481 FREE ASSAY (FT-3): CPT

## 2018-10-24 PROCEDURE — 99214 OFFICE O/P EST MOD 30 MIN: CPT | Mod: PBBFAC,PO | Performed by: FAMILY MEDICINE

## 2018-10-24 NOTE — Clinical Note
We documented a pulse of 36 today.  She has been in the ER multiple times with dizziness and palpitations.  She has an appt with you November 8.  She also has a history of multinodular thyroid.  Last ultrasound and TSH was two years ago so I am updating that, getting a magnesium level and a holter monitor before she sees you.

## 2018-10-24 NOTE — PROGRESS NOTES
Subjective:       Patient ID: Erica Masterson is a 73 y.o. female.    Chief Complaint: Hospital Follow Up    73-year-old female comes in for a hospital follow-up from a visit to [facility] October 21, 2018.  The patient presented with complaints of dizziness reportedly due to orthostatic dizziness by the emergency room.  The patient states this was the most recent of 4 ER visits for similar complaints.  She reports she gets most of her symptoms at night and they often wake her from sleep.  She generally wakes up with a sensation of pounding in her chest with a more rapid than normal heartbeat.  She has no chest pain or shortness of breath, no diaphoresis, no nausea or vomiting.  She does have dizziness when she stands up.  In the emergency room an EKG was unremarkable with a pulse rate of 57.  Lab work was unremarkable as well.  No medication changes were made in she was discharged to follow up with me and with Cardiology, Dr. Delarosa.  The patient does have a history of nodular goiter and is been slightly over 2 years since her last ultrasound of the thyroid and her last TSH level.  TSH levels have been normal for the last 4 years prior to that.  She has no sensation of increased sensitivity to cold or heat, no constipation or diarrhea, and no cognitive impairment.  She is not taking any beta-blockers or calcium channel blockers or anti dysrhythmic agents.    Past Medical History:  No date: Allergy  No date: Cataract  No date: Colon polyp  4/3/2012: Degenerative arthritis of knee  3/26/2012: Depression  No date: Diverticulosis  No date: GERD (gastroesophageal reflux disease)  No date: Hyperlipidemia  No date: Hypertension  3/26/2012: Multinodular goiter  1/18/2010: Myopathy, unspecified  No date: Tuberculosis    Past Surgical History:  6/6/2013: ARTHROSCOPY, KNEE; Right      Comment:  Per[facility] by [facility] at [facility] OR  10/19/2017: BLOCK- BRANCH- SACROILIAC; Right      Comment:   Performed by Julián Chiang MD at [facility] OR  2015: BREAST BIOPSY; Left      Comment:  benign  12/2/15: COLONOSCOPY      Comment:  Dr. Britton, multiple polyps, recheck five years-three                years if more than 2 polyps are adenomatous  12/2/2015: COLONOSCOPY; N/A  12/2/2015: COLONOSCOPY; N/A      Comment:  Performed by Jose [facility]on MD at [facility] ENDO  6/29/2016: ESOPHAGOGASTRODUODENOSCOPY (EGD); N/A      Comment:  Performed by Jose Britton MD at [facility] ENDO  No date: FOOT SURGERY  No date: HYSTERECTOMY  10/19/2017: INJECTION-JOINT; Right      Comment:  Performed by Julián Chiang MD at [facility] OR  06/06/2013: KNEE SURGERY      Comment:  right knee tear Dr Iverson   1966: LUNG LOBECTOMY      Comment:  right middle lobectomy, due to Tb  No date: OOPHORECTOMY  No date: SHOULDER SURGERY      Comment:  francis     Current Outpatient Medications on File Prior to Visit:  aspirin (ECOTRIN) 81 MG EC tablet, Take 162 mg by mouth once daily. , Disp: , Rfl:   carboxymethylcellulose sodium 0.25 % Drop, Place 1 drop into both eyes once daily., Disp: , Rfl:   coQ10, ubiquinol, 100 mg Cap, Take 1 capsule by mouth once daily., Disp: , Rfl:   DENAVIR 1 % cream, Apply topically daily as needed. , Disp: , Rfl:   escitalopram oxalate (LEXAPRO) 5 MG Tab, Take 1 tablet (5 mg total) by mouth nightly., Disp: 30 tablet, Rfl: 1  ibandronate (BONIVA) 150 mg tablet, TAKE ONE TABLET BY MOUTH ONCE EVERY 30 DAYS, Disp: 3 tablet, Rfl: 4  lisinopril (PRINIVIL,ZESTRIL) 20 MG tablet, Take 1 tablet (20 mg total) by mouth once daily., Disp: 90 tablet, Rfl: 3  rosuvastatin (CRESTOR) 20 MG tablet, Take 1 tablet (20 mg total) by mouth every evening., Disp: 90 tablet, Rfl: 1  valACYclovir (VALTREX) 1000 MG tablet, One tab PO BID x 2-5 days PRN flare, Disp: 30 tablet, Rfl: 2    No current facility-administered medications on file prior to visit.           Review of Systems   Constitutional: Negative for chills and fever.   Respiratory: Negative for cough,  chest tightness and shortness of breath.    Cardiovascular: Positive for palpitations. Negative for chest pain.   Gastrointestinal: Negative for constipation, diarrhea, nausea and vomiting.   Endocrine: Negative for cold intolerance and heat intolerance.   Psychiatric/Behavioral: Negative for confusion and decreased concentration.       Objective:      Physical Exam   Constitutional: She is oriented to person, place, and time. She appears well-developed and well-nourished. No distress.   Adequate blood pressure with bradycardia  Normal weight with a BMI 27.5 she is down 0.9 lb from her last visit with me August 21, 2018   HENT:   Head: Normocephalic and atraumatic.   Eyes: EOM are normal. Pupils are equal, round, and reactive to light.   Neck: Normal range of motion. Neck supple. Thyromegaly (Mild) present.   Cardiovascular: Regular rhythm and normal heart sounds. Exam reveals no gallop and no friction rub.   No murmur heard.  Regular rhythm with a low heart rate of 36, no ectopics noted   Pulmonary/Chest: Effort normal and breath sounds normal. No stridor. No respiratory distress. She has no wheezes. She has no rales. She exhibits no tenderness.   Neurological: She is alert and oriented to person, place, and time.   Skin: Skin is warm and dry. She is not diaphoretic.   Psychiatric: She has a normal mood and affect. Her behavior is normal. Judgment and thought content normal.   Nursing note and vitals reviewed.      Assessment:       1. Bradycardia    2. Tachycardia-bradycardia syndrome    3. Goiter, nodular        Plan:       1. Bradycardia  Possible hypothyroidism, no obvious medication etiology seen, possible tachy-mahi syndrome.  May need pacemaker she has an appointment with Cardiology November 8th  - TSH; Future  - T4, free; Future  - T3, free; Future  - Magnesium; Future  - Holter monitor - 24 hour (CUPID ONLY-[facility]); Future    2. Tachycardia-bradycardia  syndrome  See above  - TSH; Future  - T4, free; Future  - T3, free; Future  - Magnesium; Future  - Holter monitor - 24 hour (CUPID ONLY-[facility] [place]); Future    3. Goiter, nodular  Reassess with TSH, thyroid panel, ultrasound  - TSH; Future  - T4, free; Future  - T3, free; Future  - US Thyroid; Future

## 2018-10-25 ENCOUNTER — HOSPITAL ENCOUNTER (OUTPATIENT)
Dept: RADIOLOGY | Facility: CLINIC | Age: 73
Discharge: HOME | End: 2018-10-25
Attending: FAMILY MEDICINE
Payer: MEDICARE

## 2018-10-25 ENCOUNTER — CLINICAL SUPPORT (OUTPATIENT)
Dept: CARDIOLOGY | Facility: CLINIC | Age: 73
End: 2018-10-25
Attending: FAMILY MEDICINE
Payer: MEDICARE

## 2018-10-25 DIAGNOSIS — R00.1 BRADYCARDIA: ICD-10-CM

## 2018-10-25 DIAGNOSIS — E04.9 GOITER, NODULAR: ICD-10-CM

## 2018-10-25 DIAGNOSIS — I49.5 TACHYCARDIA-BRADYCARDIA SYNDROME: ICD-10-CM

## 2018-10-25 PROCEDURE — 76536 US EXAM OF HEAD AND NECK: CPT | Mod: TC,PO

## 2018-10-25 PROCEDURE — 93226 XTRNL ECG REC<48 HR SCAN A/R: CPT | Mod: PBBFAC,PO | Performed by: INTERNAL MEDICINE

## 2018-10-25 PROCEDURE — 93227 XTRNL ECG REC<48 HR R&I: CPT | Mod: S$PBB,,, | Performed by: INTERNAL MEDICINE

## 2018-10-25 PROCEDURE — 76536 US EXAM OF HEAD AND NECK: CPT | Mod: 26,,, | Performed by: RADIOLOGY

## 2018-10-30 LAB
OHS CV HOLTER LENGTH DECIMAL HOURS: 23.57
OHS CV HOLTER LENGTH HOURS: 23
OHS CV HOLTER LENGTH MINUTES: 34

## 2018-11-01 ENCOUNTER — TELEPHONE (OUTPATIENT)
Dept: FAMILY MEDICINE | Facility: CLINIC | Age: 73
End: 2018-11-01

## 2018-11-01 ENCOUNTER — TELEPHONE (OUTPATIENT)
Dept: CARDIOLOGY | Facility: CLINIC | Age: 73
End: 2018-11-01

## 2018-11-01 NOTE — TELEPHONE ENCOUNTER
Spoke with Ms. Masterson states she is having trouble sleeping at night and is not feeling well. She states she is having pain in her shoulder blades. Denies chest pain but states she feels her heart racing intermittently. Recently has holter monitor placed by PCP. Will forward message to Dr. Stubbs for further instruction. Pt. States she also feels weak and is having some dizziness. Advised pt if she starts feeling worse or has chest pain she should go to the ER. LAMBERT Navarro RN

## 2018-11-01 NOTE — TELEPHONE ENCOUNTER
----- Message from Shruti Jones sent at 11/1/2018 11:44 AM CDT -----  Contact: Self  Patient states she is having problems and needs to speak to the nurse and be advised before her appt ext next week     Patient had a monitor on and is not getting any sleep with this     Please call back [phone]  Or cell [phone]

## 2018-11-01 NOTE — TELEPHONE ENCOUNTER
Patient informed. She was contacted by Dr Enoc cunningham 11-8-18. Told her to go to er if needed.

## 2018-11-01 NOTE — TELEPHONE ENCOUNTER
----- Message from Narayan Myers MD sent at 10/30/2018  8:03 PM CDT -----  Holter monitor looks okay.  When she was having symptoms she usually was having either normal rhythm or an atrial rhythm disturbance.  These are not usually dangerous and we can generally suppress them with a low-dose beta-blocker.

## 2018-11-01 NOTE — TELEPHONE ENCOUNTER
Her pulse was 40 when I last saw her so I am very reluctant to put her on something that will slow it down more.  Wait for the appt with Dr. Stubbs next week

## 2018-11-01 NOTE — TELEPHONE ENCOUNTER
Called pt advised of results given by Dr. Stubbs and that she is okay to wait to see him until next Thursday. LAMBERT Navarro RN

## 2018-11-01 NOTE — TELEPHONE ENCOUNTER
Patient was read report. She stated she had another one of her episodes this am. She has an apt with Dr Stubbs on 11-8-18. Can she be given something before that?

## 2018-11-08 ENCOUNTER — OFFICE VISIT (OUTPATIENT)
Dept: CARDIOLOGY | Facility: CLINIC | Age: 73
End: 2018-11-08
Payer: MEDICARE

## 2018-11-08 VITALS
WEIGHT: 169.06 LBS | HEIGHT: 66 IN | OXYGEN SATURATION: 95 % | DIASTOLIC BLOOD PRESSURE: 66 MMHG | HEART RATE: 84 BPM | SYSTOLIC BLOOD PRESSURE: 142 MMHG | BODY MASS INDEX: 27.17 KG/M2

## 2018-11-08 DIAGNOSIS — G47.9 SLEEP DISORDER: ICD-10-CM

## 2018-11-08 DIAGNOSIS — E65 ABDOMINAL OBESITY: ICD-10-CM

## 2018-11-08 DIAGNOSIS — I49.1 PAC (PREMATURE ATRIAL CONTRACTION): ICD-10-CM

## 2018-11-08 DIAGNOSIS — E78.00 PURE HYPERCHOLESTEROLEMIA: ICD-10-CM

## 2018-11-08 DIAGNOSIS — I25.3 ATRIAL SEPTAL ANEURYSM: ICD-10-CM

## 2018-11-08 DIAGNOSIS — Z91.89 CARDIOVASCULAR EVENT RISK: Primary | ICD-10-CM

## 2018-11-08 PROCEDURE — 99499 UNLISTED E&M SERVICE: CPT | Mod: S$GLB,,, | Performed by: INTERNAL MEDICINE

## 2018-11-08 PROCEDURE — 99215 OFFICE O/P EST HI 40 MIN: CPT | Mod: S$GLB,,, | Performed by: INTERNAL MEDICINE

## 2018-11-08 PROCEDURE — 1101F PT FALLS ASSESS-DOCD LE1/YR: CPT | Mod: CPTII,S$GLB,, | Performed by: INTERNAL MEDICINE

## 2018-11-08 PROCEDURE — 3077F SYST BP >= 140 MM HG: CPT | Mod: CPTII,S$GLB,, | Performed by: INTERNAL MEDICINE

## 2018-11-08 PROCEDURE — 99999 PR PBB SHADOW E&M-EST. PATIENT-LVL III: CPT | Mod: PBBFAC,,, | Performed by: INTERNAL MEDICINE

## 2018-11-08 PROCEDURE — 3078F DIAST BP <80 MM HG: CPT | Mod: CPTII,S$GLB,, | Performed by: INTERNAL MEDICINE

## 2018-11-08 RX ORDER — METOPROLOL TARTRATE 25 MG/1
25 TABLET, FILM COATED ORAL 2 TIMES DAILY PRN
Qty: 60 TABLET | Refills: 3 | Status: SHIPPED | OUTPATIENT
Start: 2018-11-08 | End: 2019-08-30

## 2018-11-08 NOTE — PROGRESS NOTES
Subjective:    Patient ID:  Erica Masterson is a 73 y.o. female who presents for evaluation of Annual Exam  For ASCVD event risk, hypercholesterolemia, recurrent heart pounding with lightheadedness, post ED visit  PCP: Dr. Myers, see biannually, does labs  Orthopedic: Dr. Iverson  Muscle: Dr. Irving  Back: Dr. Perez  Prior cardiologist: Dr. Munguia, last seen 5/2015  GI: Dr. Britton  Pain: Dr. Chiang  Lives with , Joel, non-smoker  [employer]    AAF with negative cardiac history, here for follow up. Denies any CP nor SOB. Active and want to be active, walk daily and stretches without problem. Enjoy playing basketball. ECG from ED was normal in 6/2016. To ED twice for nausea and undergoing GI evaluation, problem started about 2 months, no inciting factors. Chart reviewed recent labs shows CKD eGFR 47, normal CBC. Last lipid in 2/2016, .8, non- on 40 mg of atorvastatin. Baseline lipid in 2010 .2.       Chart reviewed, prior cardiac workup with stress Echo in 12/2014  exercised for 7.0 minutes on a High Ramp protocol, corresponding to a functional capacity of 12 estimated METS, achieving a peak heart rate of 160 bpm, which is 111% of the age predicted maximum heart rate.     There were no significant electrocardiographic changes throughout the protocol suggesting ischemia.     EKG Conclusions:    1. The EKG portion of this study is negative for ischemia at a high workload, and peak heart rate of 160 bpm (111% of predicted).   2. Exercise capacity is above average.   3. Blood pressure response to exercise was normal (Presenting BP: 94/52 Peak BP: 163/87).   4. No significant arrhythmias were present.   5. There were no symptoms of chest discomfort or significant dyspnea throughout the protocol.   6. The Duke treadmill score was 7 suggesting a low probability for future cardiovascular events.    ECHO CONCLUSIONS     1 - Concentric remodeling. with the septum measuring 1.1 cm  "and the posterior wall measuring 1.0 cm across.     2 - Normal left ventricular systolic function (EF 60-65%).     3 - Normal left ventricular diastolic function.     4 - Normal right ventricular systolic function .     No evidence of stress induced myocardial ischemia.     In 10/2017, no new cardiac problem, some joint pains in the right hip, lower back and right shoulder, helped with PT. Anticipate injection into the right hip. Denies any CP nor SOB. Active, biking twice a day for 30 minutes each, no problem. Labs in 9/2017, essentially normal kidney function and LDL 98.2.    HPI comments: in 11/2018, report recurrent "chest pound" lasting up to 2 hours with associated lightheadedness. First noted about 4 years ago, had 3 ED visits. BP log at home 97 to 166 / 59 to 81, HR 54 to 101. Active with exercise twice a week for an hour along with walking 3 times weekly for an hour without problem. LDL in 8/2018 was 93, targe is < 78. Admit to coffee 2-3 cpd alone with Coke about 3 a week, rare beer. Have lower Lisinopril to 10 mg daily for the past week.  10/21/2018 ED note - "73-year-old woman presents to the emergency department for evaluation of chronic lightheadedness. States she has seen primary care and cardiologist without etiology to her lightheadedness. No syncopal episodes. No chest pain or shortness of breath. No fever. No bloody bowel movements. She is not anemic, she has no evidence of decompensated heart failure. I do not suspect infectious etiology. She is not orthostatic. No arrhythmias. Workup in the emergency department is unremarkable. Patient provided with reassurance. She is to follow up with her PCP/cardiologist for further evaluation. ECG in sinus bradycardia, rate 57, normal.    Chart reviewed, had stress Echo in 12/2017:  exercised for 6.5 minutes on a High Ramp protocol, corresponding to a functional capacity of 10 estimated METS, achieving a peak heart rate of 151 bpm, which is 107% of the age " predicted maximum heart rate. The patient discontinued exercise   secondary to fatigue.     There were no significant electrocardiographic changes throughout the protocol suggesting ischemia.     EKG Conclusions:    1. The EKG portion of this study is negative for ischemia at a high workload, and peak heart rate of 151 bpm (107% of predicted).   2. Exercise capacity is above average.   3. Blood pressure response to exercise was normal (Presenting BP: 130/62 Peak BP: 152/61).   4. No significant arrhythmias were present.   5. There were no symptoms of chest discomfort or significant dyspnea throughout the protocol.   6. The Duke treadmill score was 7 suggesting a low probability for future cardiovascular events.     Echo CONCLUSIONS     1 - Normal left ventricular systolic function (EF 65-70%).     2 - No wall motion abnormalities.     3 - Normal left ventricular diastolic function.     4 - Normal right ventricular systolic function .     5 - The estimated PA systolic pressure is 21 mmHg.     6 - Atrial septal aneurysm .     7 - Difficult windows, recommend use of contrast for future studies.     No evidence of stress induced myocardial ischemia.     Holter 10/2018 - Symptoms reported: SOB in atrial bigeminy, HR of 83 bpm, and shoulder pain in NSR, rate 60 bpm, no ST changes  Mostly in NSR with frequent PAC, 33,266 (34% of complexes), rare PVC        Review of Systems   Constitution: Positive for weight gain (5 lbs since 10/2017). Negative for diaphoresis, fever, weakness, malaise/fatigue and night sweats.   HENT: Negative for nosebleeds and tinnitus.    Eyes: Positive for blurred vision. Negative for visual disturbance.   Cardiovascular: Negative for chest pain, claudication, cyanosis, dyspnea on exertion, irregular heartbeat, leg swelling, near-syncope, orthopnea, palpitations and paroxysmal nocturnal dyspnea.   Respiratory: Positive for shortness of breath. Negative for cough, sleep disturbances due to breathing,  "snoring and wheezing.         Barnstead score 9   Endocrine: Negative for polydipsia and polyuria.   Hematologic/Lymphatic: Does not bruise/bleed easily.   Skin: Negative for color change, flushing, nail changes, poor wound healing and suspicious lesions.   Musculoskeletal: Positive for arthritis, joint pain (right shoulder) and stiffness. Negative for falls, gout, joint swelling, muscle cramps, muscle weakness and myalgias.   Gastrointestinal: Positive for abdominal pain, bowel incontinence, constipation, flatus, hemorrhoids and nausea. Negative for heartburn, hematemesis, hematochezia and melena.   Genitourinary: Positive for nocturia (1-2).   Neurological: Positive for difficulty with concentration, disturbances in coordination, dizziness, light-headedness, numbness, paresthesias and vertigo. Negative for excessive daytime sleepiness, focal weakness, headaches and loss of balance.   Psychiatric/Behavioral: Negative for depression and substance abuse. The patient has insomnia and is nervous/anxious.    Allergic/Immunologic: Positive for environmental allergies.        Objective:    Physical Exam   Constitutional: She is oriented to person, place, and time. She appears well-developed and well-nourished.   HENT:   Head: Normocephalic.   Eyes: Conjunctivae and EOM are normal. Pupils are equal, round, and reactive to light.   Neck: Normal range of motion. Neck supple. No JVD present. No thyromegaly present.   Circumference 15.25" down to 13"   Cardiovascular: Normal rate, normal heart sounds and intact distal pulses. An irregular rhythm present. Exam reveals no gallop and no friction rub.   No murmur heard.  Pulses:       Carotid pulses are 2+ on the right side, and 2+ on the left side.       Radial pulses are 2+ on the right side, and 2+ on the left side.        Femoral pulses are 2+ on the right side, and 2+ on the left side.       Popliteal pulses are 2+ on the right side, and 2+ on the left side.        Dorsalis " "pedis pulses are 2+ on the right side, and 2+ on the left side.        Posterior tibial pulses are 2+ on the right side, and 2+ on the left side.   Pulmonary/Chest: Effort normal and breath sounds normal. She has no rales. She exhibits no tenderness.   Abdominal: Soft. Bowel sounds are normal. There is no tenderness.   Waist 36.5" up to 37", hip 40.5"   Musculoskeletal: Normal range of motion. She exhibits no edema.   Lymphadenopathy:     She has no cervical adenopathy.   Neurological: She is alert and oriented to person, place, and time.   Skin: Skin is warm and dry. No rash noted.         Assessment:       1. Cardiovascular event risk, ASCVD 10-year risk 13%, 2010    2. PAC (premature atrial contraction), frequent on Holter, 34%, over 33,200    3. Atrial septal aneurysm    4. Pure hypercholesterolemia    5. Abdominal obesity    6. Sleep disorder         Plan:       Erica was seen today for annual exam.    Diagnoses and all orders for this visit:    Cardiovascular event risk, ASCVD 10-year risk 13%, 2010    PAC (premature atrial contraction), frequent on Holter, 34%, over 33,200    Atrial septal aneurysm    Pure hypercholesterolemia    Abdominal obesity    Sleep disorder    - All medical issues reviewed, continue current Rx.  - CV status stable, continue current Rx, all medications reviewed, patient acknowledge good understanding.  - Instruction for Mediterranean diet and heart healthy exercise given.  - Recommend at least annual cardiovascular evaluation in view of her significant risk factors.  - Check home blood pressure, 2 days weekly, do 2 readings within 5 minutes in AM and PM, keep log for review.  - Reduce caffeine intake  - Highly recommend Yoga, [place]-Chi and or meditation  - Sleep disorder best treated with CBT, cognitive behavior therapy  - Highly recommend 30 minutes of exercise daily, can have Sunday off, with 2-3 sessions of muscle strengthening weekly. A  would be very " helpful.    Chronic kidney disease, stage III (moderate), eGFR 47, 2016 - improved    Essential hypertension - controlled    BMI 26.0-26.9,adult - up to 27.7    - Weigh twice weekly, try to lose 1-2 lbs per week, goal is 5%-10% loss    Patient Active Problem List   Diagnosis    Diverticulosis    Keratoconjunctivitis, unspecified    Myopathy, unspecified    Hyperlipidemia, baseline .2, 2010    Depression    Adenomatous polyp of colon    Multinodular goiter    Chronic low back pain    Degenerative arthritis of knee    Hereditary and idiopathic peripheral neuropathy    Spinal enthesopathy    Lateral meniscal tear    Hypertension    PAD (peripheral artery disease)    Anxiety    GERD (gastroesophageal reflux disease)    Cardiovascular event risk, ASCVD 10-year risk 13%, 2010    BMI 26.0-26.9,adult    Abdominal obesity    Hypertensive left ventricular hypertrophy, without heart failure    SI (sacroiliac) joint dysfunction    PAC (premature atrial contraction), frequent on Holter, 34%, over 33,200    Atrial septal aneurysm    Sleep disorder     Total face-to-face time with the patient was 45 minutes and greater than 50% was spent in counseling and coordination of care. The above assessment and plan have been discussed at length. Labs and procedure over the last 6 months reviewed. Problem List updated. Asked to bring in all active medications / pills bottles with next visit.

## 2018-11-16 DIAGNOSIS — E78.5 HYPERLIPIDEMIA, UNSPECIFIED HYPERLIPIDEMIA TYPE: ICD-10-CM

## 2018-11-16 RX ORDER — ROSUVASTATIN CALCIUM 20 MG/1
TABLET, COATED ORAL
Qty: 90 TABLET | Refills: 1 | Status: SHIPPED | OUTPATIENT
Start: 2018-11-16 | End: 2019-04-01 | Stop reason: SDUPTHER

## 2018-11-27 ENCOUNTER — TELEPHONE (OUTPATIENT)
Dept: FAMILY MEDICINE | Facility: CLINIC | Age: 73
End: 2018-11-27

## 2018-11-27 NOTE — TELEPHONE ENCOUNTER
----- Message from Lidya Chanel sent at 11/27/2018  8:25 AM CST -----  Contact: patient  Type: Needs Medical Advice    Who Called:  Patient  Symptoms (please be specific):    How long has patient had these symptoms:    Pharmacy name and phone #:    Best Call Back Number: [phone]  Additional Information: patient requesting a call back regarding medication coQ10, ubiquinol, 100 mg Cap

## 2018-11-27 NOTE — TELEPHONE ENCOUNTER
Spoke to pt about CO Q10, pt has bought rapid release and did not like the feeling the medication had given her so she stopped taking it. BP have been running normal according to pt. 113/85, 107/62m, 117/66 with pulse ranging from 65-72. Pt stated she was going to go to store and buy the regular coq10 to start taking again.

## 2018-11-30 ENCOUNTER — TELEPHONE (OUTPATIENT)
Dept: CARDIOLOGY | Facility: CLINIC | Age: 73
End: 2018-11-30

## 2018-11-30 NOTE — TELEPHONE ENCOUNTER
----- Message from Ofe Samano sent at 11/30/2018  2:09 PM CST -----  Type: Needs Medical Advice    Who Called:  Patient  Best Call Back Number: [phone] (cellphone) or [phone]  Additional Information: Patient requesting to speak with nurse concerning issues she has been experiencing since stopping medication/please call back to advise.

## 2018-11-30 NOTE — TELEPHONE ENCOUNTER
Ms. Maldonado called in and stated that she is not longer having the muscle aches, and soreness since massaging them. She stated that she has not had to take her Metoprolol since her BP has been in the 112-120s/70s-80s, and her HR is staying between 70-80. I advised her I would let Dr. Stubbs know. She verbalized understanding. No further issues noted.

## 2018-12-04 ENCOUNTER — OFFICE VISIT (OUTPATIENT)
Dept: OPHTHALMOLOGY | Facility: CLINIC | Age: 73
End: 2018-12-04
Payer: MEDICARE

## 2018-12-04 ENCOUNTER — LAB VISIT (OUTPATIENT)
Dept: LAB | Facility: HOSPITAL | Age: 73
End: 2018-12-04
Attending: OPHTHALMOLOGY
Payer: MEDICARE

## 2018-12-04 DIAGNOSIS — R51.9 SCALP TENDERNESS: ICD-10-CM

## 2018-12-04 DIAGNOSIS — H02.883 MEIBOMIAN GLAND DYSFUNCTION (MGD) OF BOTH EYES: Primary | ICD-10-CM

## 2018-12-04 DIAGNOSIS — H02.886 MEIBOMIAN GLAND DYSFUNCTION (MGD) OF BOTH EYES: Primary | ICD-10-CM

## 2018-12-04 LAB
CRP SERPL-MCNC: 0.4 MG/L
ERYTHROCYTE [SEDIMENTATION RATE] IN BLOOD BY WESTERGREN METHOD: 26 MM/HR

## 2018-12-04 PROCEDURE — 36415 COLL VENOUS BLD VENIPUNCTURE: CPT

## 2018-12-04 PROCEDURE — 92012 INTRM OPH EXAM EST PATIENT: CPT | Mod: S$GLB,,, | Performed by: OPHTHALMOLOGY

## 2018-12-04 PROCEDURE — 85651 RBC SED RATE NONAUTOMATED: CPT

## 2018-12-04 PROCEDURE — 86140 C-REACTIVE PROTEIN: CPT

## 2018-12-04 PROCEDURE — 99999 PR PBB SHADOW E&M-EST. PATIENT-LVL III: CPT | Mod: PBBFAC,,, | Performed by: OPHTHALMOLOGY

## 2018-12-04 RX ORDER — NEOMYCIN SULFATE, POLYMYXIN B SULFATE, AND DEXAMETHASONE 3.5; 10000; 1 MG/G; [USP'U]/G; MG/G
OINTMENT OPHTHALMIC
Qty: 3.5 G | Refills: 0 | Status: SHIPPED | OUTPATIENT
Start: 2018-12-04 | End: 2019-02-04 | Stop reason: SDUPTHER

## 2018-12-04 RX ORDER — DOXYCYCLINE HYCLATE 100 MG
100 TABLET ORAL 2 TIMES DAILY
Qty: 20 TABLET | Refills: 0 | Status: SHIPPED | OUTPATIENT
Start: 2018-12-04 | End: 2018-12-14

## 2018-12-04 NOTE — PROGRESS NOTES
HPI     74 YO female presents today for an eye problem. C/O blurred vision in the   morning, crusting, tearing, and redness OU. She also states that her   forehead feels sore or tender often, as well as light headed in the   mornings. Slight pain when squeezing her eyes shut OD.       Soothe OU BID    Last edited by CHRISTINE Gracia on 12/4/2018  2:15 PM. (History)        ROS     Positive for: Constitutional (mild weight gain), Skin (scalp tenderness),   Musculoskeletal (some pain with upper body exercises)    Negative for: HENT (denies jaw claudication)    Last edited by Ivory Munoz MD on 12/4/2018  3:20 PM.   (History)        Assessment /Plan     For exam results, see Encounter Report.    Meibomian gland dysfunction (MGD) of both eyes  -     neomycin-polymyxin-dexamethasone (DEXACINE) 3.5 mg/g-10,000 unit/g-0.1 % Oint; Apply to right upper and lower lid QAM. Apply in both eyes QHS.  Dispense: 3.5 g; Refill: 0  -     doxycycline (VIBRA-TABS) 100 MG tablet; Take 1 tablet (100 mg total) by mouth 2 (two) times daily. for 10 days  Dispense: 20 tablet; Refill: 0    Scalp tenderness  -     Sedimentation rate, manual; Future; Expected date: 12/04/2018  -     C-REACTIVE PROTEIN; Future; Expected date: 12/04/2018          Inspissated meibomian glands, especially inferior. Unable to express with gentle massage with cotton tip applicator, even after warm compress application in office. Warm compresses QID, lid hygiene. Handout given. Maxitrol antonette BID OD, QHS OS. PO Doxycycline x 10 days. RTC if worsens or fails to improve.    No optic disc edema, color vision WNL. Will check ESR/CRP as precaution. Ongoing for few months, had CBC during that time - platelets 309.

## 2018-12-05 ENCOUNTER — TELEPHONE (OUTPATIENT)
Dept: OPHTHALMOLOGY | Facility: CLINIC | Age: 73
End: 2018-12-05

## 2018-12-14 ENCOUNTER — TELEPHONE (OUTPATIENT)
Dept: CARDIOLOGY | Facility: CLINIC | Age: 73
End: 2018-12-14

## 2018-12-14 NOTE — TELEPHONE ENCOUNTER
----- Message from Zenaida Sheriff sent at 12/14/2018  2:56 PM CST -----  Contact: Erica  Type: Needs Medical Advice    Who Called:  patient  Best Call Back Number: [phone]  Additional Information: patient has discontinued taking metoprolol tartrate (LOPRESSOR) 25 MG tablet--the pounding has gone away--she just wanted to let us know that she stopped taking this 3 days after getting the medication filled (11/12)--thank you

## 2019-01-02 ENCOUNTER — TELEPHONE (OUTPATIENT)
Dept: CARDIOLOGY | Facility: CLINIC | Age: 74
End: 2019-01-02

## 2019-01-02 NOTE — TELEPHONE ENCOUNTER
----- Message from Forrest Rucker sent at 1/2/2019 12:37 PM CST -----  Contact: Patient  Advised she needs to speak with the nurse regarding medication.  Please call [phone] (home) or [phone] (C)

## 2019-01-02 NOTE — TELEPHONE ENCOUNTER
Called and spoke with Ms. Morales, she stated that she has stopped taking her metoprolol, and wanted to make sure Dr. Stubbs was okay with this. She stated that she has been exercising, and the tightness that she was having on the left side of her chest has subsided. She stated she no longer feels the pounding in her chest, and all around feels good. She monitors her BP daily. I advised her I would let Dr. Stubbs know, and she stated she would like a returned call either way to know what he says. No further issues noted.

## 2019-01-03 NOTE — TELEPHONE ENCOUNTER
Called and spoke with Ms. Morales, I advised her per Dr. Stubbs that it is fine to hold her metoprolol. She verbalized understanding. No further issues noted.

## 2019-02-01 NOTE — PROGRESS NOTES
"        Assessment /Plan     For exam results, see Encounter Report.    Meibomian gland dysfunction (MGD) of both eyes  -     neomycin-polymyxin-dexamethasone (DEXACINE) 3.5 mg/g-10,000 unit/g-0.1 % Oint; Apply to right lower lid QAM. Apply in both eyes QHS.  Dispense: 3.5 g; Refill: 0    Nuclear sclerosis, bilateral    Cortical cataract of both eyes    Hyperopia with astigmatism and presbyopia, bilateral    Anatomical narrow angle borderline glaucoma of both eyes  -     Young Visual Field - OU - Extended - Both Eyes; Standing    Other orders  -     flaxseed 1,000 mg Cap; Take 2 capsules by mouth TID with meals until eyelid improves, then as directed on bottle.  Dispense: 180 capsule; Refill: prn            MGD RLL - resume warm compresses, lid hygiene. Maxitrol BID. Start PO Flaxseed oil. RTC if worsens.     Glaucoma suspect, bilateral - C:D ratio appears increased from others documented in epic. Pt was followed before for glaucoma suspect by Dr. Stoddard, testing done back in 2006.    IOP WNL again today (16 OU)   No known family history.    Previous HRT suggests some RNFL thinning OD; most recent with poor alignment OD, OS changes in LCD and RNFL but still WNL    HVF OS few superior misses "borderline"; OD WNL   Last OCT nerve remains WNL OU.   CCT slightly thin   Last gonio open to TM, with thin SS with compression OU.      Nuclear sclerosis, bilateral - approaching visual significance, OD looks farther along than OS today. No longer drives after 5 pm, not comfortable driving at night, has missed a turn. To do surgery on only one eye would leave her with anisometropia. Dilates only to ~6.4 mm, despite denies flomax.     Cortical cataract of both eyes - "    Hyperopia with astigmatism and presbyopia, bilateral - MRx given prior visit.      Follow-up in about 4 months (around 6/4/2019) for IOP check, DFE with 1% only, HRT, MRx, BAT.                 "

## 2019-02-04 ENCOUNTER — CLINICAL SUPPORT (OUTPATIENT)
Dept: OPHTHALMOLOGY | Facility: CLINIC | Age: 74
End: 2019-02-04
Attending: OPHTHALMOLOGY
Payer: MEDICARE

## 2019-02-04 DIAGNOSIS — H52.4 HYPEROPIA WITH ASTIGMATISM AND PRESBYOPIA, BILATERAL: ICD-10-CM

## 2019-02-04 DIAGNOSIS — H40.033 ANATOMICAL NARROW ANGLE BORDERLINE GLAUCOMA OF BOTH EYES: ICD-10-CM

## 2019-02-04 DIAGNOSIS — H52.03 HYPEROPIA WITH ASTIGMATISM AND PRESBYOPIA, BILATERAL: ICD-10-CM

## 2019-02-04 DIAGNOSIS — H02.883 MEIBOMIAN GLAND DYSFUNCTION (MGD) OF BOTH EYES: Primary | ICD-10-CM

## 2019-02-04 DIAGNOSIS — H40.033 ANATOMICAL NARROW ANGLE OF BOTH EYES: ICD-10-CM

## 2019-02-04 DIAGNOSIS — H02.886 MEIBOMIAN GLAND DYSFUNCTION (MGD) OF BOTH EYES: Primary | ICD-10-CM

## 2019-02-04 DIAGNOSIS — H26.9 CORTICAL CATARACT OF BOTH EYES: ICD-10-CM

## 2019-02-04 DIAGNOSIS — H25.13 NUCLEAR SCLEROSIS, BILATERAL: ICD-10-CM

## 2019-02-04 DIAGNOSIS — H52.203 HYPEROPIA WITH ASTIGMATISM AND PRESBYOPIA, BILATERAL: ICD-10-CM

## 2019-02-04 PROCEDURE — 99999 PR PBB SHADOW E&M-EST. PATIENT-LVL III: ICD-10-PCS | Mod: PBBFAC,,, | Performed by: OPHTHALMOLOGY

## 2019-02-04 PROCEDURE — 92020 GONIOSCOPY: CPT | Mod: S$GLB,,, | Performed by: OPHTHALMOLOGY

## 2019-02-04 PROCEDURE — 92012 PR EYE EXAM, EST PATIENT,INTERMED: ICD-10-PCS | Mod: S$GLB,,, | Performed by: OPHTHALMOLOGY

## 2019-02-04 PROCEDURE — 99999 PR PBB SHADOW E&M-EST. PATIENT-LVL III: CPT | Mod: PBBFAC,,, | Performed by: OPHTHALMOLOGY

## 2019-02-04 PROCEDURE — 92083 EXTENDED VISUAL FIELD XM: CPT | Mod: S$GLB,,, | Performed by: OPHTHALMOLOGY

## 2019-02-04 PROCEDURE — 92083 HUMPHREY VISUAL FIELD - OU - BOTH EYES: ICD-10-PCS | Mod: S$GLB,,, | Performed by: OPHTHALMOLOGY

## 2019-02-04 PROCEDURE — 92020 PR SPECIAL EYE EVAL,GONIOSCOPY: ICD-10-PCS | Mod: S$GLB,,, | Performed by: OPHTHALMOLOGY

## 2019-02-04 PROCEDURE — 92012 INTRM OPH EXAM EST PATIENT: CPT | Mod: S$GLB,,, | Performed by: OPHTHALMOLOGY

## 2019-02-04 RX ORDER — NEOMYCIN SULFATE, POLYMYXIN B SULFATE, AND DEXAMETHASONE 3.5; 10000; 1 MG/G; [USP'U]/G; MG/G
OINTMENT OPHTHALMIC
Qty: 3.5 G | Refills: 0 | Status: SHIPPED | OUTPATIENT
Start: 2019-02-04 | End: 2019-06-10

## 2019-03-06 ENCOUNTER — OFFICE VISIT (OUTPATIENT)
Dept: FAMILY MEDICINE | Facility: CLINIC | Age: 74
End: 2019-03-06
Payer: MEDICARE

## 2019-03-06 ENCOUNTER — TELEPHONE (OUTPATIENT)
Dept: FAMILY MEDICINE | Facility: CLINIC | Age: 74
End: 2019-03-06

## 2019-03-06 ENCOUNTER — TELEPHONE (OUTPATIENT)
Dept: GASTROENTEROLOGY | Facility: CLINIC | Age: 74
End: 2019-03-06

## 2019-03-06 VITALS
DIASTOLIC BLOOD PRESSURE: 79 MMHG | HEIGHT: 66 IN | TEMPERATURE: 98 F | HEART RATE: 91 BPM | BODY MASS INDEX: 27.17 KG/M2 | WEIGHT: 169.06 LBS | OXYGEN SATURATION: 99 % | SYSTOLIC BLOOD PRESSURE: 139 MMHG

## 2019-03-06 DIAGNOSIS — N39.0 URINARY TRACT INFECTION WITH HEMATURIA, SITE UNSPECIFIED: Primary | ICD-10-CM

## 2019-03-06 DIAGNOSIS — R31.9 URINARY TRACT INFECTION WITH HEMATURIA, SITE UNSPECIFIED: Primary | ICD-10-CM

## 2019-03-06 LAB
BILIRUB SERPL-MCNC: NORMAL MG/DL
BLOOD, POC UA: ABNORMAL
GLUCOSE UR QL STRIP: NORMAL
KETONES UR QL STRIP: NEGATIVE
LEUKOCYTE ESTERASE URINE, POC: ABNORMAL
NITRITE, POC UA: POSITIVE
PH, POC UA: 5
PROTEIN, POC: ABNORMAL
SPECIFIC GRAVITY, POC UA: 1.01
UROBILINOGEN, POC UA: NORMAL

## 2019-03-06 PROCEDURE — 99214 PR OFFICE/OUTPT VISIT, EST, LEVL IV, 30-39 MIN: ICD-10-PCS | Mod: 25,S$GLB,, | Performed by: FAMILY MEDICINE

## 2019-03-06 PROCEDURE — 3075F PR MOST RECENT SYSTOLIC BLOOD PRESS GE 130-139MM HG: ICD-10-PCS | Mod: CPTII,S$GLB,, | Performed by: FAMILY MEDICINE

## 2019-03-06 PROCEDURE — 81000 POCT URINALYSIS: ICD-10-PCS | Mod: S$GLB,,, | Performed by: FAMILY MEDICINE

## 2019-03-06 PROCEDURE — 3075F SYST BP GE 130 - 139MM HG: CPT | Mod: CPTII,S$GLB,, | Performed by: FAMILY MEDICINE

## 2019-03-06 PROCEDURE — 1101F PR PT FALLS ASSESS DOC 0-1 FALLS W/OUT INJ PAST YR: ICD-10-PCS | Mod: CPTII,S$GLB,, | Performed by: FAMILY MEDICINE

## 2019-03-06 PROCEDURE — 81000 URINALYSIS NONAUTO W/SCOPE: CPT | Mod: S$GLB,,, | Performed by: FAMILY MEDICINE

## 2019-03-06 PROCEDURE — 87086 URINE CULTURE/COLONY COUNT: CPT

## 2019-03-06 PROCEDURE — 99214 OFFICE O/P EST MOD 30 MIN: CPT | Mod: 25,S$GLB,, | Performed by: FAMILY MEDICINE

## 2019-03-06 PROCEDURE — 87088 URINE BACTERIA CULTURE: CPT

## 2019-03-06 PROCEDURE — 1101F PT FALLS ASSESS-DOCD LE1/YR: CPT | Mod: CPTII,S$GLB,, | Performed by: FAMILY MEDICINE

## 2019-03-06 PROCEDURE — 99999 PR PBB SHADOW E&M-EST. PATIENT-LVL III: CPT | Mod: PBBFAC,,, | Performed by: FAMILY MEDICINE

## 2019-03-06 PROCEDURE — 87186 SC STD MICRODIL/AGAR DIL: CPT

## 2019-03-06 PROCEDURE — 3078F DIAST BP <80 MM HG: CPT | Mod: CPTII,S$GLB,, | Performed by: FAMILY MEDICINE

## 2019-03-06 PROCEDURE — 3078F PR MOST RECENT DIASTOLIC BLOOD PRESSURE < 80 MM HG: ICD-10-PCS | Mod: CPTII,S$GLB,, | Performed by: FAMILY MEDICINE

## 2019-03-06 PROCEDURE — 87077 CULTURE AEROBIC IDENTIFY: CPT

## 2019-03-06 PROCEDURE — 99999 PR PBB SHADOW E&M-EST. PATIENT-LVL III: ICD-10-PCS | Mod: PBBFAC,,, | Performed by: FAMILY MEDICINE

## 2019-03-06 RX ORDER — PHENAZOPYRIDINE HYDROCHLORIDE 200 MG/1
200 TABLET, FILM COATED ORAL 3 TIMES DAILY PRN
Qty: 6 TABLET | Refills: 0 | Status: SHIPPED | OUTPATIENT
Start: 2019-03-06 | End: 2019-03-16

## 2019-03-06 RX ORDER — AMOXICILLIN AND CLAVULANATE POTASSIUM 875; 125 MG/1; MG/1
1 TABLET, FILM COATED ORAL 2 TIMES DAILY
Qty: 14 TABLET | Refills: 0 | Status: SHIPPED | OUTPATIENT
Start: 2019-03-06 | End: 2019-03-13

## 2019-03-06 NOTE — TELEPHONE ENCOUNTER
----- Message from Madelyn Walters sent at 3/6/2019 12:15 PM CST -----  Call [phone]  Or cell [phone] requesting to schedule a colonoscopy

## 2019-03-06 NOTE — PROGRESS NOTES
Subjective:       Patient ID: Erica Masterson is a 73 y.o. female.    Chief Complaint: Urinary Tract Infection    Patient here for UC visit.      Urinary Tract Infection    This is a new problem. The current episode started yesterday. The problem occurs every urination. The problem has been unchanged. The quality of the pain is described as aching and burning. The pain is moderate. There has been no fever. Associated symptoms include hematuria and urgency. Pertinent negatives include no flank pain, nausea or rash. She has tried increased fluids for the symptoms. The treatment provided no relief.     Review of Systems   Constitutional: Negative for fever.   Respiratory: Negative for shortness of breath.    Cardiovascular: Negative for chest pain.   Gastrointestinal: Negative for abdominal pain and nausea.   Genitourinary: Positive for hematuria and urgency. Negative for flank pain.   Skin: Negative for rash.   All other systems reviewed and are negative.      Objective:      Physical Exam   Constitutional: She appears well-developed. No distress.   Cardiovascular: Normal rate and regular rhythm.   No murmur heard.  Pulmonary/Chest: Effort normal and breath sounds normal.   Abdominal: Soft. There is tenderness in the suprapubic area. There is no CVA tenderness.       Assessment:       1. Urinary tract infection with hematuria, site unspecified        Plan:       Erica was seen today for urinary tract infection.    Diagnoses and all orders for this visit:    Urinary tract infection with hematuria, site unspecified  -     POCT Urinalysis  -     Urine culture  -     phenazopyridine (PYRIDIUM) 200 MG tablet; Take 1 tablet (200 mg total) by mouth 3 (three) times daily as needed for Pain.    Other orders  -     amoxicillin-clavulanate 875-125mg (AUGMENTIN) 875-125 mg per tablet; Take 1 tablet by mouth 2 (two) times daily. Take after meals. for 7 days    Push fluids intake.

## 2019-03-06 NOTE — TELEPHONE ENCOUNTER
----- Message from Felecia Michelle sent at 3/6/2019  9:23 AM CST -----  Type:  Same Day Appointment Request    Caller is requesting a same day appointment.  Caller declined first available appointment listed below.      Name of Caller:  Erica  When is the first available appointment?  3/7/19  Symptoms:  she has some blood in her urine and urinary frequency  Best Call Back Number:  [phone] or [phone]  Additional Information:   She scheduled for tomorrow morning, but she wants to be seen today.  Thank you!

## 2019-03-08 ENCOUNTER — TELEPHONE (OUTPATIENT)
Dept: FAMILY MEDICINE | Facility: CLINIC | Age: 74
End: 2019-03-08

## 2019-03-08 LAB — BACTERIA UR CULT: NORMAL

## 2019-03-08 NOTE — TELEPHONE ENCOUNTER
----- Message from Joanie Jorge sent at 3/8/2019 12:59 PM CST -----  Contact: [phone] or [phone]  Patient is calling for results from 3/6/19. Please call patient at [phone] or [phone]. Thanks!

## 2019-03-08 NOTE — TELEPHONE ENCOUNTER
Advised pt that Dr. Schwarz hasn't made final reading on the lab yet. Advised pt to con't w/course of abx as prescribed to her. Understanding verbalized.

## 2019-03-11 DIAGNOSIS — Z86.010 HISTORY OF COLON POLYPS: Primary | ICD-10-CM

## 2019-03-11 DIAGNOSIS — Z12.11 SCREENING FOR COLON CANCER: ICD-10-CM

## 2019-03-14 ENCOUNTER — TELEPHONE (OUTPATIENT)
Dept: FAMILY MEDICINE | Facility: CLINIC | Age: 74
End: 2019-03-14

## 2019-03-14 NOTE — TELEPHONE ENCOUNTER
----- Message from Ofe Bello sent at 3/14/2019  8:37 AM CDT -----  Type: Needs Medical Advice    Who Called:  Patient   Best Call Back Number: [phone] home or [phone] cell  Additional Information: Patient is scheduled on 08/30/19 for a annual physical, she is requesting orders for labs prior to this visit, and would like to include a kidney function test, please contact to advise.     Thank you

## 2019-03-20 ENCOUNTER — HOSPITAL ENCOUNTER (OUTPATIENT)
Facility: HOSPITAL | Age: 74
Discharge: HOME | End: 2019-03-20
Attending: INTERNAL MEDICINE | Admitting: INTERNAL MEDICINE
Payer: MEDICARE

## 2019-03-20 ENCOUNTER — TELEPHONE (OUTPATIENT)
Dept: GASTROENTEROLOGY | Facility: CLINIC | Age: 74
End: 2019-03-20

## 2019-03-20 ENCOUNTER — ANESTHESIA EVENT (OUTPATIENT)
Dept: ENDOSCOPY | Facility: HOSPITAL | Age: 74
End: 2019-03-20
Payer: MEDICARE

## 2019-03-20 ENCOUNTER — ANESTHESIA (OUTPATIENT)
Dept: ENDOSCOPY | Facility: HOSPITAL | Age: 74
End: 2019-03-20
Payer: MEDICARE

## 2019-03-20 DIAGNOSIS — K64.9 HEMORRHOIDS, UNSPECIFIED HEMORRHOID TYPE: Primary | ICD-10-CM

## 2019-03-20 DIAGNOSIS — K63.5 POLYP OF COLON, UNSPECIFIED PART OF COLON, UNSPECIFIED TYPE: Primary | ICD-10-CM

## 2019-03-20 DIAGNOSIS — K64.8 INTERNAL HEMORRHOIDS: ICD-10-CM

## 2019-03-20 DIAGNOSIS — Z86.010 HISTORY OF COLON POLYPS: ICD-10-CM

## 2019-03-20 PROBLEM — Z86.0100 HISTORY OF COLON POLYPS: Status: ACTIVE | Noted: 2019-03-20

## 2019-03-20 PROCEDURE — 37000008 HC ANESTHESIA 1ST 15 MINUTES: Performed by: INTERNAL MEDICINE

## 2019-03-20 PROCEDURE — D9220A PRA ANESTHESIA: Mod: PT,ANES,, | Performed by: ANESTHESIOLOGY

## 2019-03-20 PROCEDURE — D9220A PRA ANESTHESIA: ICD-10-PCS | Mod: PT,ANES,, | Performed by: ANESTHESIOLOGY

## 2019-03-20 PROCEDURE — D9220A PRA ANESTHESIA: Mod: PT,CRNA,, | Performed by: NURSE ANESTHETIST, CERTIFIED REGISTERED

## 2019-03-20 PROCEDURE — 45380 COLONOSCOPY AND BIOPSY: CPT | Performed by: INTERNAL MEDICINE

## 2019-03-20 PROCEDURE — 88305 TISSUE SPECIMEN TO PATHOLOGY - SURGERY: ICD-10-PCS | Mod: 26,,, | Performed by: PATHOLOGY

## 2019-03-20 PROCEDURE — 88305 TISSUE EXAM BY PATHOLOGIST: CPT | Performed by: PATHOLOGY

## 2019-03-20 PROCEDURE — 45380 PR COLONOSCOPY,BIOPSY: ICD-10-PCS | Mod: 59,,, | Performed by: INTERNAL MEDICINE

## 2019-03-20 PROCEDURE — 27201012 HC FORCEPS, HOT/COLD, DISP: Performed by: INTERNAL MEDICINE

## 2019-03-20 PROCEDURE — 45385 PR COLONOSCOPY,REMV LESN,SNARE: ICD-10-PCS | Mod: PT,,, | Performed by: INTERNAL MEDICINE

## 2019-03-20 PROCEDURE — 27201089 HC SNARE, DISP (ANY): Performed by: INTERNAL MEDICINE

## 2019-03-20 PROCEDURE — 25000003 PHARM REV CODE 250: Performed by: INTERNAL MEDICINE

## 2019-03-20 PROCEDURE — 63600175 PHARM REV CODE 636 W HCPCS: Performed by: NURSE ANESTHETIST, CERTIFIED REGISTERED

## 2019-03-20 PROCEDURE — 37000009 HC ANESTHESIA EA ADD 15 MINS: Performed by: INTERNAL MEDICINE

## 2019-03-20 PROCEDURE — 45385 COLONOSCOPY W/LESION REMOVAL: CPT | Performed by: INTERNAL MEDICINE

## 2019-03-20 PROCEDURE — 45380 COLONOSCOPY AND BIOPSY: CPT | Mod: 59,,, | Performed by: INTERNAL MEDICINE

## 2019-03-20 PROCEDURE — D9220A PRA ANESTHESIA: ICD-10-PCS | Mod: PT,CRNA,, | Performed by: NURSE ANESTHETIST, CERTIFIED REGISTERED

## 2019-03-20 PROCEDURE — 45385 COLONOSCOPY W/LESION REMOVAL: CPT | Mod: PT,,, | Performed by: INTERNAL MEDICINE

## 2019-03-20 RX ORDER — PROPOFOL 10 MG/ML
VIAL (ML) INTRAVENOUS
Status: DISCONTINUED | OUTPATIENT
Start: 2019-03-20 | End: 2019-03-20

## 2019-03-20 RX ORDER — SODIUM CHLORIDE 9 MG/ML
INJECTION, SOLUTION INTRAVENOUS CONTINUOUS
Status: DISCONTINUED | OUTPATIENT
Start: 2019-03-20 | End: 2019-03-20 | Stop reason: HOSPADM

## 2019-03-20 RX ADMIN — PROPOFOL 100 MG: 10 INJECTION, EMULSION INTRAVENOUS at 10:03

## 2019-03-20 RX ADMIN — PROPOFOL 50 MG: 10 INJECTION, EMULSION INTRAVENOUS at 10:03

## 2019-03-20 RX ADMIN — SODIUM CHLORIDE: 0.9 INJECTION, SOLUTION INTRAVENOUS at 08:03

## 2019-03-20 NOTE — ANESTHESIA POSTPROCEDURE EVALUATION
"Anesthesia Post Evaluation    Patient: Erica Masterson    Procedure(s) Performed: Procedure(s) (LRB):  COLONOSCOPY (N/A)    Final Anesthesia Type: general  Patient location during evaluation: PACU  Patient participation: Yes- Able to Participate  Level of consciousness: awake and alert  Post-procedure vital signs: reviewed and stable  Pain management: adequate  Airway patency: patent  PONV status at discharge: No PONV  Anesthetic complications: no      Cardiovascular status: hemodynamically stable  Respiratory status: unassisted and room air  Hydration status: euvolemic  Follow-up not needed.        Visit Vitals  /68   Pulse 82   Temp 36.8 °C (98.3 °F) (Oral)   Resp 16   Ht 5' 5.5" (1.664 m)   Wt 76.7 kg (169 lb)   SpO2 97%   Breastfeeding? No   BMI 27.70 kg/m²       Pain/Marco Antonio Score: Marco Antonio Score: 10 (3/20/2019 10:54 AM)        "

## 2019-03-20 NOTE — H&P
CC: History of colon polyps - last scope in 2015    73 year old female with above. States that symptoms are absent, no alleviating/exacerbating factors. No family history of CA. Positive personal history of polyps. No bleeding or weight loss.     ROS:  No headache, no fever/chills, no chest pain/SOB, no nausea/vomiting/diarrhea/constipation/GI bleeding/abdominal pain, no dysuria/hematuria.    VSSAF   Exam:   Alert and oriented x 3; no apparent distress   PERRLA, sclera anicteric  CV: Regular rate/rhythm, normal PMI   Lungs: Clear bilaterally with no wheeze/rales   Abdomen: Soft, NT/ND, normal bowel sounds   Ext: No cyanosis, clubbing     Impression:   As above    Plan:   Proceed with endoscopy. Further recs to follow.

## 2019-03-20 NOTE — DISCHARGE INSTRUCTIONS
Hemorrhoids    Hemorrhoids are swollen and inflamed veins inside the rectum and near the anus. The rectum is the last several inches of the colon. The anus is the passage between the rectum and the outside of the body.  Causes  The veins can become swollen due to increased pressure in them. This is most often caused by:  · Chronic constipation or diarrhea  · Straining when having a bowel movement  · Sitting too long on the toilet  · A low-fiber diet  · Pregnancy  Symptoms  · Bleeding from the rectum (this may be noticeable after bowel movements)  · Lump near the anus  · Itching around the anus  · Pain around the anus  There are different types of hemorrhoids. Depending on the type you have and the severity, you may be able to treat yourself at home. In some cases, a procedure may be the best treatment option. Your healthcare provider can tell you more about this, if needed.  Home care  General care  · To get relief from pain or itching, try:  ¨ Topical products. Your healthcare provider may prescribe or recommend creams, ointments, or pads that can be applied to the hemorrhoid. Use these exactly as directed.  ¨ Medicines. Your healthcare provider may recommend stool softeners, suppositories, or laxatives to help manage constipation. Use these exactly as directed.  ¨ Sitz baths. A sitz bath involves sitting in a few inches of warm bath water. Be careful not to make the water so hot that you burn yourself--test it before sitting in it. Soak for about 10 to 15 minutes a few times a day. This may help relieve pain.  Tips to help prevent hemorrhoids  · Eat more fiber. Fiber adds bulk to stool and absorbs water as it moves through your colon. This makes stool softer and easier to pass.  ¨ Increase the fiber in your diet with more fiber-rich foods. These include fresh fruit, vegetables, and whole grains.  ¨ Take a fiber supplement or bulking agent, if advised to by your provider. These include products such as psyllium  or methylcellulose.  · Drink plenty of water, if directed to by your provider. This can help keep stool soft.  · Be more active. Frequent exercise aids digestion and helps prevent constipation. It may also help make bowel movements more regular.  · Dont strain during bowel movements. This can make hemorrhoids more likely. Also, dont sit on the toilet for long periods of time.  Follow-up care  Follow up with your healthcare provider, or as advised. If a culture or imaging tests were done, you will be notified of the results when they are ready. This may take a few days or longer.  When to seek medical advice  Call your healthcare provider right away if any of these occur:  · Increased bleeding from the rectum  · Increased pain around the rectum or anus  · Weakness or dizziness  Call 911  Call 911 or return to the emergency department right away if any of these occur:  · Trouble breathing or swallowing  · Fainting or loss of consciousness  · Unusually fast heart rate  · Vomiting blood  · Large amounts of blood in stool  Date Last Reviewed: 6/22/2015 © 2000-2017 [employer]. [address] [place] [address]. All rights reserved. This information is not intended as a substitute for professional medical care. Always follow your healthcare professional's instructions.        Diverticulosis    Diverticulosis means that small pouches have formed in the wall of your large intestine (colon). Most often, this problem causes no symptoms and is common as people age. But the pouches in the colon are at risk of becoming infected. When this happens, the condition is called diverticulitis. Although most people with diverticulosis never develop diverticulitis, it is still not uncommon. Rectal bleeding can also occur and in less common situations, a type of colon inflammation called colitis.  While most people do not have symptoms, some people with diverticulosis may have:  · Abdominal cramps and  pain  · Bloating  · Constipation  · Change in bowel habits  Causes  The exact cause of diverticulosis (and diverticulitis) has not been proved, but a few things are associated with the condition:  · Low-fiber diet  · Constipation  · Lack of exercise  Your healthcare provider will talk with you about how to manage your condition. Diet changes may be all that are needed to help control diverticulosis and prevent progression to diverticulitis. If you develop diverticulitis, you will likely need other treatments.  Home care  You may be told to take fiber supplements daily. Fiber adds bulk to the stool so that it passes through the colon more easily. Stool softeners may be recommended. You may also be given medications for pain relief. Be sure to take all medications as directed.  In the past, people were told to avoid corn, nuts, and seeds. This is no longer necessary.  Follow these guidelines when caring for yourself at home:  · Eat unprocessed foods that are high in fiber. Whole grains, fruits, and vegetables are good choices.  · Drink 6 to 8 glasses of water every day unless your healthcare provider has you limit how much fluid you should have.  · Watch for changes in your bowel movements. Tell your provider if you notice any changes.  · Begin an exercise program. Ask your provider how to get started. Generally, walking is the best.  · Get plenty of rest and sleep.  Follow-up care  Follow up with your healthcare provider, or as advised. Regular visits may be needed to check on your health. Sometimes special procedures such as colonoscopy, are needed after an episode of diverticulitis or blooding. Be sure to keep all your appointments.  If a stool sample was taken, or cultures were done, you should be told if they are positive, or if your treatment needs to be changed. You can call as directed for the results.  If X-rays were done, a radiologist will look at them. You will be told if there is a change in your  treatment.  If antibiotics were prescribed, be sure to finish them all.  When to seek medical advice  Call your healthcare provider right away if any of these occur:  · Fever of 100.4°F (38°C) or higher, or as directed by your healthcare provider  · Severe cramps in the lower left side of the abdomen or pain that is getting worse  · Tenderness in the lower left side of the abdomen or worsening pain throughout the abdomen  · Diarrhea or constipation that doesn't get better within 24 hours  · Nausea and vomiting  · Bleeding from the rectum  Call 911  Call emergency services if any of the following occur:  · Trouble breathing  · Confusion  · Very drowsy or trouble awakening  · Fainting or loss of consciousness  · Rapid heart rate  · Chest pain  Date Last Reviewed: 12/30/2015 © 2000-2017 [employer]. [address] [place] [address], [place] [address]. All rights reserved. This information is not intended as a substitute for professional medical care. Always follow your healthcare professional's instructions.        Understanding Colon and Rectal Polyps    The colon (also called the large intestine) is a muscular tube that forms the last part of the digestive tract. It absorbs water and stores food waste. The colon is about 4 to 6 feet long. The rectum is the last 6 inches of the colon. The colon and rectum have a smooth lining composed of millions of cells. Changes in these cells can lead to growths in the colon that can become cancerous and should be removed. Multiple tests are available to screen for colon cancer, but the colonoscopy is the most recommended test. During colonoscopy, these polyps can be removed. How often you need this test depends on many things including your condition, your family history, symptoms, and what the findings were at the previous colonoscopy.   When the colon lining changes  Changes that happen in the cells that line the colon or rectum can lead to growths called polyps. Over a  period of years, polyps can turn cancerous. Removing polyps early may prevent cancer from ever forming.  Polyps  Polyps are fleshy clumps of tissue that form on the lining of the colon or rectum. Small polyps are usually benign (not cancerous). However, over time, cells in a polyp can change and become cancerous. Certain types of polyps known as adenomatous polyps are premalignant. The risk for invasive cancer increases with the size of the polyp and certain cell and gene features. This means that they can become cancerous if they're not removed. Hyperplastic polyps are benign. They can grow quite large and not turn cancerous.   Cancer  Almost all colorectal cancers start when polyp cells begin growing abnormally. As a cancerous tumor grows, it may involve more and more of the colon or rectum. In time, cancer can also grow beyond the colon or rectum and spread to nearby organs or to glands called lymph nodes. The cells can also travel to other parts of the body. This is known as metastasis. The earlier a cancerous tumor is removed, the better the chance of preventing its spread.    Date Last Reviewed: 8/1/2016  © 3095-8484 [employer]. [address] [place] [address]. All rights reserved. This information is not intended as a substitute for professional medical care. Always follow your healthcare professional's instructions.        Colonoscopy     A camera attached to a flexible tube with a viewing lens is used to take video pictures.     Colonoscopy is a test to view the inside of your lower digestive tract (colon and rectum). Sometimes it can show the last part of the small intestine (ileum). During the test, small pieces of tissue may be removed for testing. This is called a biopsy. Small growths, such as polyps, may also be removed.   Why is colonoscopy done?  The test is done to help look for colon cancer. And it can help find the source of abdominal pain, bleeding, and changes in bowel  habits. It may be needed once a year, depending on factors such as your:  · Age  · Health history  · Family health history  · Symptoms  · Results from any prior colonoscopy  Risks and possible complications  These include:  · Bleeding               · A puncture or tear in the colon   · Risks of anesthesia  · A cancer lesion not being seen  Getting ready   To prepare for the test:  · Talk with your healthcare provider about the risks of the test (see below). Also ask your healthcare provider about alternatives to the test.  · Tell your healthcare provider about any medicines you take. Also tell him or her about any health conditions you may have.  · Make sure your rectum and colon are empty for the test. Follow the diet and bowel prep instructions exactly. If you dont, the test may need to be rescheduled.  · Plan for a friend or family member to drive you home after the test.     Colonoscopy provides an inside view of the entire colon.     You may discuss the results with your doctor right away or at a future visit.  During the test   The test is usually done in the hospital on an outpatient basis. This means you go home the same day. The procedure takes about 30 minutes. During that time:  · You are given relaxing (sedating) medicine through an IV line. You may be drowsy, or fully asleep.  · The healthcare provider will first give you a physical exam to check for anal and rectal problems.  · Then the anus is lubricated and the scope inserted.  · If you are awake, you may have a feeling similar to needing to have a bowel movement. You may also feel pressure as air is pumped into the colon. Its OK to pass gas during the procedure.  · Biopsy, polyp removal, or other treatments may be done during the test.  After the test   You may have gas right after the test. It can help to try to pass it to help prevent later bloating. Your healthcare provider may discuss the results with you right away. Or you may need to  "schedule a follow-up visit to talk about the results. After the test, you can go back to your normal eating and other activities. You may be tired from the sedation and need to rest for a few hours.  Date Last Reviewed: 11/1/2016  © 7888-1177 [employer]. [address] [place] [address]. All rights reserved. This information is not intended as a substitute for professional medical care. Always follow your healthcare professional's instructions.      Discharge Instructions: After Your Surgery/Procedure  Youve just had surgery. During surgery you were given medicine called anesthesia to keep you relaxed and free of pain. After surgery you may have some pain or nausea. This is common. Here are some tips for feeling better and getting well after surgery.     Stay on schedule with your medication.   Going home  Your doctor or nurse will show you how to take care of yourself when you go home. He or she will also answer your questions. Have an adult family member or friend drive you home.      For your safety we recommend these precaution for the first 24 hours after your procedure:  · Do not drive or use heavy equipment.  · Do not make important decisions or sign legal papers.  · Do not drink alcohol.  · Have someone stay with you, if needed. He or she can watch for problems and help keep you safe.  · Your concentration, balance, coordination, and judgement may be impaired for many hours after anesthesia.  Use caution when ambulating or standing up.     · You may feel weak and "washed out" after anesthesia and surgery.      Subtle residual effects of general anesthesia or sedation with regional / local anesthesia can last more than 24 hours.  Rest for the remainder of the day or longer if your Doctor/Surgeon has advised you to do so.  Although you may feel normal within the first 24 hours, your reflexes and mental ability may be impaired without you realizing it.  You may feel dizzy, lightheaded " or sleepy for 24 hours or longer.      Be sure to go to all follow-up visits with your doctor. And rest after your surgery for as long as your doctor tells you to.  Coping with pain  If you have pain after surgery, pain medicine will help you feel better. Take it as told, before pain becomes severe. Also, ask your doctor or pharmacist about other ways to control pain. This might be with heat, ice, or relaxation. And follow any other instructions your surgeon or nurse gives you.  Tips for taking pain medicine  To get the best relief possible, remember these points:  · Pain medicines can upset your stomach. Taking them with a little food may help.  · Most pain relievers taken by mouth need at least 20 to 30 minutes to start to work.  · Taking medicine on a schedule can help you remember to take it. Try to time your medicine so that you can take it before starting an activity. This might be before you get dressed, go for a walk, or sit down for dinner.  · Constipation is a common side effect of pain medicines. Call your doctor before taking any medicines such as laxatives or stool softeners to help ease constipation. Also ask if you should skip any foods. Drinking lots of fluids and eating foods such as fruits and vegetables that are high in fiber can also help. Remember, do not take laxatives unless your surgeon has prescribed them.  · Drinking alcohol and taking pain medicine can cause dizziness and slow your breathing. It can even be deadly. Do not drink alcohol while taking pain medicine.  · Pain medicine can make you react more slowly to things. Do not drive or run machinery while taking pain medicine.  Your health care provider may tell you to take acetaminophen to help ease your pain. Ask him or her how much you are supposed to take each day. Acetaminophen or other pain relievers may interact with your prescription medicines or other over-the-counter (OTC) drugs. Some prescription medicines have acetaminophen  and other ingredients. Using both prescription and OTC acetaminophen for pain can cause you to overdose. Read the labels on your OTC medicines with care. This will help you to clearly know the list of ingredients, how much to take, and any warnings. It may also help you not take too much acetaminophen. If you have questions or do not understand the information, ask your pharmacist or health care provider to explain it to you before you take the OTC medicine.  Managing nausea  Some people have an upset stomach after surgery. This is often because of anesthesia, pain, or pain medicine, or the stress of surgery. These tips will help you handle nausea and eat healthy foods as you get better. If you were on a special food plan before surgery, ask your doctor if you should follow it while you get better. These tips may help:  · Do not push yourself to eat. Your body will tell you when to eat and how much.  · Start off with clear liquids and soup. They are easier to digest.  · Next try semi-solid foods, such as mashed potatoes, applesauce, and gelatin, as you feel ready.  · Slowly move to solid foods. Dont eat fatty, rich, or spicy foods at first.  · Do not force yourself to have 3 large meals a day. Instead eat smaller amounts more often.  · Take pain medicines with a small amount of solid food, such as crackers or toast, to avoid nausea.     Call your surgeon if  · You still have pain an hour after taking medicine. The medicine may not be strong enough.  · You feel too sleepy, dizzy, or groggy. The medicine may be too strong.  · You have side effects like nausea, vomiting, or skin changes, such as rash, itching, or hives.       If you have obstructive sleep apnea  You were given anesthesia medicine during surgery to keep you comfortable and free of pain. After surgery, you may have more apnea spells because of this medicine and other medicines you were given. The spells may last longer than usual.   At home:  · Keep  using the continuous positive airway pressure (CPAP) device when you sleep. Unless your health care provider tells you not to, use it when you sleep, day or night. CPAP is a common device used to treat obstructive sleep apnea.  · Talk with your provider before taking any pain medicine, muscle relaxants, or sedatives. Your provider will tell you about the possible dangers of taking these medicines.  © 6790-3773 [employer]. [address] [place] [address], [place] [address]. All rights reserved. This information is not intended as a substitute for professional medical care. Always follow your healthcare professional's instructions.

## 2019-03-20 NOTE — [employer] PATIENT INSTRUCTIONS
Discharge Summary/Instructions after an Endoscopic Procedure  Patient Name: Erica Masterson  Patient MRN: [id number]  Patient YOB: 1945  Wednesday, March 20, 2019  Jose Coughlin MD  RESTRICTIONS:  During your procedure today, you received medications for sedation.  These   medications may affect your judgment, balance and coordination.  Therefore,   for 24 hours, you have the following restrictions:   - DO NOT drive a car, operate machinery, make legal/financial decisions,   sign important papers or drink alcohol.    ACTIVITY:  Today: no heavy lifting, straining or running due to procedural   sedation/anesthesia.  The following day: return to full activity including work.  DIET:  Eat and drink normally unless instructed otherwise.     TREATMENT FOR COMMON SIDE EFFECTS:  - Mild abdominal pain, nausea, belching, bloating or excessive gas:  rest,   eat lightly and use a heating pad.  - Sore Throat: treat with throat lozenges and/or gargle with warm salt   water.  - Because air was used during the procedure, expelling large amounts of air   from your rectum or belching is normal.  - If a bowel prep was taken, you may not have a bowel movement for 1-3 days.    This is normal.  SYMPTOMS TO WATCH FOR AND REPORT TO YOUR PHYSICIAN:  1. Abdominal pain or bloating, other than gas cramps.  2. Chest pain.  3. Back pain.  4. Signs of infection such as: chills or fever occurring within 24 hours   after the procedure.  5. Rectal bleeding, which would show as bright red, maroon, or black stools.   (A tablespoon of blood from the rectum is not serious, especially if   hemorrhoids are present.)  6. Vomiting.  7. Weakness or dizziness.  GO DIRECTLY TO THE NEAREST EMERGENCY ROOM IF YOU HAVE ANY OF THE FOLLOWING:      Difficulty breathing              Chills and/or fever over 101 F   Persistent vomiting and/or vomiting blood   Severe abdominal pain   Severe chest pain   Black, tarry stools   Bleeding- more than one  tablespoon   Any other symptom or condition that you feel may need urgent attention  Your doctor recommends these additional instructions:  If any biopsies were taken, your doctors clinic will contact you in 1 to 2   weeks with any results.  - Patient has a contact number available for emergencies.  The signs and   symptoms of potential delayed complications were discussed with the   patient.  Return to normal activities tomorrow.  Written discharge   instructions were provided to the patient.   - High fiber diet.   - Continue present medications.   - Await pathology results.   - Repeat colonoscopy in 5 years for surveillance.   - Discharge patient to home (ambulatory).   - Return to my office PRN.   - Refer to a colo-rectal surgeon at appointment to be scheduled to examine   hemorrhoids.  For questions, problems or results please call your physician - Jose Coughlin MD at Work:  [phone].  [facility], EMERGENCY ROOM PHONE NUMBER: [phone]  IF A COMPLICATION OR EMERGENCY SITUATION ARISES AND YOU ARE UNABLE TO REACH   YOUR PHYSICIAN - GO DIRECTLY TO THE EMERGENCY ROOM.  Jose Coughlin MD  3/20/2019 10:43:04 AM  This report has been verified and signed electronically.  [employer]

## 2019-03-20 NOTE — ANESTHESIA PREPROCEDURE EVALUATION
03/20/2019  Erica Masterson is a 73 y.o., female.    Pre-op Assessment    I have reviewed the Patient Summary Reports.     I have reviewed the Nursing Notes.   I have reviewed the Medications.     Review of Systems  Anesthesia Hx:  No problems with previous Anesthesia    Social:  Non-Smoker    Cardiovascular:   Hypertension, well controlled hyperlipidemia    Hepatic/GI:   Bowel Prep. GERD, poorly controlled    Musculoskeletal:   Arthritis     Neurological:   Neuromuscular Disease,    Psych:   Psychiatric History depression          Physical Exam  General:  Well nourished    Airway/Jaw/Neck:  Airway Findings: Mouth Opening: Normal Tongue: Normal  General Airway Assessment: Adult, Good  Mallampati: I       Chest/Lungs:  Chest/Lungs Findings: Clear to auscultation, Normal Respiratory Rate     Heart/Vascular:  Heart Findings: Rate: Normal  Rhythm: Regular Rhythm  Sounds: Normal        Mental Status:  Mental Status Findings:  Alert and Oriented, Cooperative         Anesthesia Plan  Type of Anesthesia, risks & benefits discussed:  Anesthesia Type:  general  Patient's Preference:   Intra-op Monitoring Plan: standard ASA monitors  Intra-op Monitoring Plan Comments:   Post Op Pain Control Plan:   Post Op Pain Control Plan Comments:   Induction:   IV  Beta Blocker:  Patient is not currently on a Beta-Blocker (No further documentation required).       Informed Consent: Patient understands risks and agrees with Anesthesia plan.  Questions answered. Anesthesia consent signed with patient.  ASA Score: 2     Day of Surgery Review of History & Physical: I have interviewed and examined the patient. I have reviewed the patient's H&P dated:  There are no significant changes.          Ready For Surgery From Anesthesia Perspective.

## 2019-03-20 NOTE — TRANSFER OF CARE
"Anesthesia Transfer of Care Note    Patient: Erica Masterson    Procedure(s) Performed: Procedure(s) (LRB):  COLONOSCOPY (N/A)    Patient location: GI    Anesthesia Type: general    Transport from OR: Transported from OR on room air with adequate spontaneous ventilation    Post pain: adequate analgesia    Post assessment: no apparent anesthetic complications    Post vital signs: stable    Level of consciousness: awake    Nausea/Vomiting: no nausea/vomiting    Complications: none    Transfer of care protocol was followed      Last vitals:   Visit Vitals  BP (!) 146/66 (BP Location: Left arm, Patient Position: Lying)   Pulse 75   Temp 36.5 °C (97.7 °F) (Skin)   Resp 16   Ht 5' 5.5" (1.664 m)   Wt 76.7 kg (169 lb)   SpO2 97%   Breastfeeding? No   BMI 27.70 kg/m²     "

## 2019-03-21 VITALS
WEIGHT: 169 LBS | OXYGEN SATURATION: 97 % | HEART RATE: 82 BPM | SYSTOLIC BLOOD PRESSURE: 126 MMHG | BODY MASS INDEX: 27.16 KG/M2 | DIASTOLIC BLOOD PRESSURE: 68 MMHG | RESPIRATION RATE: 16 BRPM | HEIGHT: 66 IN | TEMPERATURE: 98 F

## 2019-04-01 DIAGNOSIS — E78.5 HYPERLIPIDEMIA, UNSPECIFIED HYPERLIPIDEMIA TYPE: ICD-10-CM

## 2019-04-01 RX ORDER — ROSUVASTATIN CALCIUM 20 MG/1
TABLET, COATED ORAL
Qty: 90 TABLET | Refills: 1 | Status: SHIPPED | OUTPATIENT
Start: 2019-04-01 | End: 2019-08-30 | Stop reason: SDUPTHER

## 2019-04-03 ENCOUNTER — TELEPHONE (OUTPATIENT)
Dept: GASTROENTEROLOGY | Facility: CLINIC | Age: 74
End: 2019-04-03

## 2019-04-03 NOTE — TELEPHONE ENCOUNTER
----- Message from Lucila Gordon sent at 4/3/2019 11:34 AM CDT -----  Contact: Patient  Type:  Patient Returning Call    Who Called:  Patient   Who Left Message for Patient:  Unsure  Does the patient know what this is regarding?:  Follow-up???  Best Call Back Number:   or   Additional Information:

## 2019-04-05 DIAGNOSIS — Z86.19 HISTORY OF PCR DNA POSITIVE FOR HSV2: Primary | ICD-10-CM

## 2019-04-05 RX ORDER — PENCICLOVIR 10 MG/G
CREAM TOPICAL DAILY PRN
Qty: 1.5 G | Refills: 0 | Status: SHIPPED | OUTPATIENT
Start: 2019-04-05 | End: 2019-08-30

## 2019-04-05 NOTE — TELEPHONE ENCOUNTER
I spoke with patient, advised that the medication is not on her insurance formulary.  If the medication is too expensive she will need to call her insurance company to find an equivalent.  She would like to know if there is something OTC she could use instead?      Last OV was 7-    Please advise

## 2019-04-08 RX ORDER — ACYCLOVIR 50 MG/G
OINTMENT TOPICAL
Qty: 15 G | Refills: 2 | Status: SHIPPED | OUTPATIENT
Start: 2019-04-08 | End: 2019-08-30 | Stop reason: ALTCHOICE

## 2019-04-08 NOTE — TELEPHONE ENCOUNTER
At patient's last visit, we prescribed oral valtrex as needed for flare. Oral medication has better control rate than topical, especially OTC.  Zovirax cream is another rx we can prescribe, less costly ($33 good rx coupon at [facility]). Abreva is an OTC cold sore treatment, poor efficacy. Discuss with patient. Rx for zovirax sent to her pharmacy for pricing

## 2019-05-08 ENCOUNTER — HOSPITAL ENCOUNTER (OUTPATIENT)
Dept: RADIOLOGY | Facility: CLINIC | Age: 74
Discharge: HOME | End: 2019-05-08
Attending: FAMILY MEDICINE
Payer: MEDICARE

## 2019-05-08 DIAGNOSIS — Z12.31 ENCOUNTER FOR SCREENING MAMMOGRAM FOR MALIGNANT NEOPLASM OF BREAST: ICD-10-CM

## 2019-05-08 PROCEDURE — 77067 SCR MAMMO BI INCL CAD: CPT | Mod: 26,,, | Performed by: RADIOLOGY

## 2019-05-08 PROCEDURE — 77063 MAMMO DIGITAL SCREENING BILAT WITH TOMOSYNTHESIS_CAD: ICD-10-PCS | Mod: 26,,, | Performed by: RADIOLOGY

## 2019-05-08 PROCEDURE — 77067 MAMMO DIGITAL SCREENING BILAT WITH TOMOSYNTHESIS_CAD: ICD-10-PCS | Mod: 26,,, | Performed by: RADIOLOGY

## 2019-05-08 PROCEDURE — 77067 SCR MAMMO BI INCL CAD: CPT | Mod: TC,PO

## 2019-05-08 PROCEDURE — 77063 BREAST TOMOSYNTHESIS BI: CPT | Mod: 26,,, | Performed by: RADIOLOGY

## 2019-05-09 ENCOUNTER — OFFICE VISIT (OUTPATIENT)
Dept: FAMILY MEDICINE | Facility: CLINIC | Age: 74
End: 2019-05-09
Payer: MEDICARE

## 2019-05-09 VITALS
DIASTOLIC BLOOD PRESSURE: 71 MMHG | HEART RATE: 87 BPM | WEIGHT: 167.31 LBS | SYSTOLIC BLOOD PRESSURE: 133 MMHG | TEMPERATURE: 98 F | BODY MASS INDEX: 26.89 KG/M2 | HEIGHT: 66 IN

## 2019-05-09 DIAGNOSIS — E01.0 THYROMEGALY: ICD-10-CM

## 2019-05-09 DIAGNOSIS — M46.00 SPINAL ENTHESOPATHY: ICD-10-CM

## 2019-05-09 DIAGNOSIS — R05.9 COUGH: ICD-10-CM

## 2019-05-09 DIAGNOSIS — I73.9 PAD (PERIPHERAL ARTERY DISEASE): ICD-10-CM

## 2019-05-09 DIAGNOSIS — J00 NASOPHARYNGITIS: Primary | ICD-10-CM

## 2019-05-09 PROCEDURE — 99214 OFFICE O/P EST MOD 30 MIN: CPT | Mod: S$GLB,,, | Performed by: PHYSICIAN ASSISTANT

## 2019-05-09 PROCEDURE — 99499 UNLISTED E&M SERVICE: CPT | Mod: S$GLB,,, | Performed by: PHYSICIAN ASSISTANT

## 2019-05-09 PROCEDURE — 99999 PR PBB SHADOW E&M-EST. PATIENT-LVL IV: ICD-10-PCS | Mod: PBBFAC,,, | Performed by: PHYSICIAN ASSISTANT

## 2019-05-09 PROCEDURE — 99499 RISK ADDL DX/OHS AUDIT: ICD-10-PCS | Mod: S$GLB,,, | Performed by: PHYSICIAN ASSISTANT

## 2019-05-09 PROCEDURE — 99999 PR PBB SHADOW E&M-EST. PATIENT-LVL IV: CPT | Mod: PBBFAC,,, | Performed by: PHYSICIAN ASSISTANT

## 2019-05-09 PROCEDURE — 3075F PR MOST RECENT SYSTOLIC BLOOD PRESS GE 130-139MM HG: ICD-10-PCS | Mod: CPTII,S$GLB,, | Performed by: PHYSICIAN ASSISTANT

## 2019-05-09 PROCEDURE — 1101F PT FALLS ASSESS-DOCD LE1/YR: CPT | Mod: CPTII,S$GLB,, | Performed by: PHYSICIAN ASSISTANT

## 2019-05-09 PROCEDURE — 3075F SYST BP GE 130 - 139MM HG: CPT | Mod: CPTII,S$GLB,, | Performed by: PHYSICIAN ASSISTANT

## 2019-05-09 PROCEDURE — 3078F PR MOST RECENT DIASTOLIC BLOOD PRESSURE < 80 MM HG: ICD-10-PCS | Mod: CPTII,S$GLB,, | Performed by: PHYSICIAN ASSISTANT

## 2019-05-09 PROCEDURE — 1101F PR PT FALLS ASSESS DOC 0-1 FALLS W/OUT INJ PAST YR: ICD-10-PCS | Mod: CPTII,S$GLB,, | Performed by: PHYSICIAN ASSISTANT

## 2019-05-09 PROCEDURE — 3078F DIAST BP <80 MM HG: CPT | Mod: CPTII,S$GLB,, | Performed by: PHYSICIAN ASSISTANT

## 2019-05-09 PROCEDURE — 99214 PR OFFICE/OUTPT VISIT, EST, LEVL IV, 30-39 MIN: ICD-10-PCS | Mod: S$GLB,,, | Performed by: PHYSICIAN ASSISTANT

## 2019-05-09 RX ORDER — MINERAL OIL
180 ENEMA (ML) RECTAL DAILY
Qty: 30 TABLET | Refills: 0 | Status: SHIPPED | OUTPATIENT
Start: 2019-05-09 | End: 2019-08-30

## 2019-05-09 RX ORDER — BENZONATATE 200 MG/1
200 CAPSULE ORAL 3 TIMES DAILY PRN
Qty: 30 CAPSULE | Refills: 0 | Status: SHIPPED | OUTPATIENT
Start: 2019-05-09 | End: 2019-05-16

## 2019-05-09 NOTE — PROGRESS NOTES
Subjective:       Patient ID: Erica Masterson is a 73 y.o. female.    Chief Complaint: Cough; Otalgia (bilateral); and Sore Throat (x 2 weeks)    Patient with controlled hypertension, hyperlipidemia, anxiety, PAD,  And spinal enthesopathy presents for evaluation cough, itchy throat, nasal congestion, rhinorrhea, and sneezing.  Symptoms have been present for approximately 2 weeks.  She is using over-the-counter antihistamine, nasal saline spray, and throat lozenges without relief of symptoms.  Her  has similar symptoms.  Regarding her hypertension her blood pressure is currently controlled.  She is compliant with medications.  Regarding her peripheral artery disease she is asymptomatic.  She is medically managed with aspirin, statin and blood pressure medications.   Patients patient medical/surgical, social and family histories have been reviewed         Cough   This is a new problem. The current episode started 1 to 4 weeks ago. The problem has been unchanged. The problem occurs constantly. The cough is non-productive. Associated symptoms include ear pain, nasal congestion, postnasal drip, rhinorrhea and a sore throat. Pertinent negatives include no chest pain, chills, ear congestion, fever, rash, shortness of breath, sweats or wheezing. Nothing aggravates the symptoms. She has tried OTC cough suppressant (nasal saline and antihistamine) for the symptoms. The treatment provided no relief.     Review of Systems   Constitutional: Negative for activity change, appetite change, chills, fatigue and fever.   HENT: Positive for congestion, ear pain, postnasal drip, rhinorrhea, sneezing and sore throat. Negative for sinus pressure, sinus pain and voice change.    Respiratory: Positive for cough. Negative for chest tightness, shortness of breath and wheezing.    Cardiovascular: Negative for chest pain.   Gastrointestinal: Negative for nausea and vomiting.   Skin: Negative for rash.       Objective:      Physical Exam    Constitutional: She appears well-developed and well-nourished. She is cooperative. She does not have a sickly appearance. She does not appear ill. No distress.   HENT:   Head: Normocephalic and atraumatic.   Right Ear: Tympanic membrane, external ear and ear canal normal.   Left Ear: Tympanic membrane, external ear and ear canal normal.   Nose: Mucosal edema present. Right sinus exhibits no maxillary sinus tenderness and no frontal sinus tenderness. Left sinus exhibits no maxillary sinus tenderness and no frontal sinus tenderness.   Mouth/Throat: Oropharynx is clear and moist. Mucous membranes are not dry. No oropharyngeal exudate, posterior oropharyngeal edema or posterior oropharyngeal erythema.   Neck: Trachea normal. Thyromegaly present.   Cardiovascular: Normal rate and regular rhythm.   No murmur heard.  Pulmonary/Chest: Effort normal and breath sounds normal. She has no wheezes. She has no rales.   Lymphadenopathy:        Head (right side): No tonsillar adenopathy present.        Head (left side): No tonsillar adenopathy present.     She has no cervical adenopathy.   Neurological: She is alert.   Skin: Skin is warm and dry.   Nursing note and vitals reviewed.      Assessment:       1. Nasopharyngitis    2. Cough    3. Thyromegaly    4. PAD (peripheral artery disease)    5. Spinal enthesopathy        Plan:       Erica was seen today for cough, otalgia and sore throat.    Diagnoses and all orders for this visit:    Nasopharyngitis    Cough    -     benzonatate (TESSALON) 200 MG capsule; Take 1 capsule (200 mg total) by mouth 3 (three) times daily as needed for Cough.  -     fexofenadine (ALLEGRA) 180 MG tablet; Take 1 tablet (180 mg total) by mouth once daily.    Call if symptoms worsen or do not improve 7-10 days     Thyromegaly  Continue per PCP  Ultrasound October 2018 reviewed    PAD, asymptomatic  Aspirin, statin, BP control   Spinal enthesopathy, asymptomatic   Monitor

## 2019-06-10 ENCOUNTER — OFFICE VISIT (OUTPATIENT)
Dept: OPHTHALMOLOGY | Facility: CLINIC | Age: 74
End: 2019-06-10
Attending: OPHTHALMOLOGY
Payer: MEDICARE

## 2019-06-10 DIAGNOSIS — H25.13 NUCLEAR SCLEROSIS, BILATERAL: Primary | ICD-10-CM

## 2019-06-10 DIAGNOSIS — H02.886 MEIBOMIAN GLAND DYSFUNCTION (MGD) OF BOTH EYES: ICD-10-CM

## 2019-06-10 DIAGNOSIS — H40.033 ANATOMICAL NARROW ANGLE OF BOTH EYES: ICD-10-CM

## 2019-06-10 DIAGNOSIS — H52.03 HYPEROPIA WITH ASTIGMATISM AND PRESBYOPIA, BILATERAL: ICD-10-CM

## 2019-06-10 DIAGNOSIS — H40.033 ANATOMICAL NARROW ANGLE BORDERLINE GLAUCOMA OF BOTH EYES: ICD-10-CM

## 2019-06-10 DIAGNOSIS — H02.883 MEIBOMIAN GLAND DYSFUNCTION (MGD) OF BOTH EYES: ICD-10-CM

## 2019-06-10 DIAGNOSIS — H26.9 CORTICAL CATARACT OF BOTH EYES: ICD-10-CM

## 2019-06-10 DIAGNOSIS — H52.203 HYPEROPIA WITH ASTIGMATISM AND PRESBYOPIA, BILATERAL: ICD-10-CM

## 2019-06-10 DIAGNOSIS — H52.4 HYPEROPIA WITH ASTIGMATISM AND PRESBYOPIA, BILATERAL: ICD-10-CM

## 2019-06-10 PROCEDURE — 99499 RISK ADDL DX/OHS AUDIT: ICD-10-PCS | Mod: S$GLB,,, | Performed by: OPHTHALMOLOGY

## 2019-06-10 PROCEDURE — 92133 CPTRZD OPH DX IMG PST SGM ON: CPT | Mod: S$GLB,,, | Performed by: OPHTHALMOLOGY

## 2019-06-10 PROCEDURE — 92014 PR EYE EXAM, EST PATIENT,COMPREHESV: ICD-10-PCS | Mod: S$GLB,,, | Performed by: OPHTHALMOLOGY

## 2019-06-10 PROCEDURE — 92133 HEIDELBERG RETINA TOMOGRAPHY (HRT) - OU - BOTH EYES: ICD-10-PCS | Mod: S$GLB,,, | Performed by: OPHTHALMOLOGY

## 2019-06-10 PROCEDURE — 92014 COMPRE OPH EXAM EST PT 1/>: CPT | Mod: S$GLB,,, | Performed by: OPHTHALMOLOGY

## 2019-06-10 PROCEDURE — 99999 PR PBB SHADOW E&M-EST. PATIENT-LVL III: CPT | Mod: PBBFAC,,, | Performed by: OPHTHALMOLOGY

## 2019-06-10 PROCEDURE — 99999 PR PBB SHADOW E&M-EST. PATIENT-LVL III: ICD-10-PCS | Mod: PBBFAC,,, | Performed by: OPHTHALMOLOGY

## 2019-06-10 PROCEDURE — 99499 UNLISTED E&M SERVICE: CPT | Mod: S$GLB,,, | Performed by: OPHTHALMOLOGY

## 2019-06-10 NOTE — PROGRESS NOTES
"HPI     4 month IOP, DFE, HRT today. Denies eye pain states she has discomfort in   the morning when she first awaken eyes feels tight ongoing for 2 weeks .   Started Yoga in April states has tightness around neck around . Using   Soothe HS OU.     Last edited by Suze Graves on 6/10/2019 10:22 AM. (History)        ROS     Positive for: Gastrointestinal, Neurological (hereditary peripheral   neuropathy; denies CVA/seizure/tremor/restless legs), Cardiovascular (HTN   - controlled with meds; PAD), Heme/Lymph (ASA, no longer on plavix)    Negative for: Genitourinary (denies flomax), Endocrine (denies DM), Eyes   (denies surgery or trauma), Respiratory (denies asthma/orthopnea)    Last edited by Ivory Munoz MD on 6/10/2019 11:21 AM.   (History)        Assessment /Plan     For exam results, see Encounter Report.    Nuclear sclerosis, bilateral    Anatomical narrow angle of both eyes   -     [facility] (HRT) - OU - Both Eyes    Anatomical narrow angle borderline glaucoma of both eyes  -     [facility] (HRT) - OU - Both Eyes    Anatomical narrow angle of both eyes  -     [facility] (HRT) - OU - Both Eyes    Cortical cataract of both eyes    Meibomian gland dysfunction (MGD) of both eyes    Hyperopia with astigmatism and presbyopia, bilateral          MGD RLL - resume warm compresses. Resume PO Flaxseed. Nighttime liqui-gel did not seem to help much, also only using gtts qam. May need to increase frequency of gtts as well.     Glaucoma suspect, bilateral - C:D ratio appears increased from others documented in epic. Pt was followed before for glaucoma suspect by Dr. Stoddard, testing done back in 2006.    IOP WNL again today (17 OU)   No known family history.    Previous HRT suggests some RNFL thinning OD; most recent with poor alignment OD, OS stable/WNL    HVF OS few superior misses "borderline"; OD WNL   Last OCT nerve remains WNL OU.   CCT slightly " "thin   Last gonio open to TM, with thin SS with compression OU.      Nuclear sclerosis, bilateral - approaching visual significance, OD looks farther along than OS today. No longer drives after 5 pm, not comfortable driving at night, has missed a turn. To do surgery on only one eye would leave her with anisometropia. Dilates only to ~6.4 mm, despite denies flomax.     Cortical cataract of both eyes - "    Hyperopia with astigmatism and presbyopia, bilateral - MRx given prior visit.      Follow-up in about 6 months (around 6/4/2019) for IOP check, DFE 24-2 HVF and OCT nerve.                     "

## 2019-07-01 ENCOUNTER — TELEPHONE (OUTPATIENT)
Dept: FAMILY MEDICINE | Facility: CLINIC | Age: 74
End: 2019-07-01

## 2019-07-01 DIAGNOSIS — L29.9 ITCHING: Primary | ICD-10-CM

## 2019-07-01 DIAGNOSIS — J30.2 SEASONAL ALLERGIES: ICD-10-CM

## 2019-07-01 NOTE — TELEPHONE ENCOUNTER
----- Message from Ofe Bello sent at 7/1/2019  3:23 PM CDT -----  Type: Needs Medical Advice    Who Called:  Patient  Symptoms (please be specific):  Constant issues with allergies, itch, throat, ears  How long has patient had these symptoms:  Since last visit  Pharmacy name and phone #:   Best Call Back Number: [phone] home, [phone] cell  Additional Information: contact to advise if patient should see Dr. Myers or an allergy specalist    Thank you

## 2019-07-18 ENCOUNTER — DOCUMENTATION ONLY (OUTPATIENT)
Dept: FAMILY MEDICINE | Facility: CLINIC | Age: 74
End: 2019-07-18

## 2019-07-18 NOTE — PROGRESS NOTES
Pre-Visit Chart Review  For Appointment Scheduled on 8-30-19    There are no preventive care reminders to display for this patient.

## 2019-07-18 NOTE — PROGRESS NOTES
Pre-Visit Chart Review  For Appointment Scheduled on 7-30-19    There are no preventive care reminders to display for this patient.

## 2019-08-27 ENCOUNTER — TELEPHONE (OUTPATIENT)
Dept: OPHTHALMOLOGY | Facility: CLINIC | Age: 74
End: 2019-08-27

## 2019-08-27 ENCOUNTER — OFFICE VISIT (OUTPATIENT)
Dept: OPHTHALMOLOGY | Facility: CLINIC | Age: 74
End: 2019-08-27
Payer: MEDICARE

## 2019-08-27 DIAGNOSIS — H52.203 HYPEROPIA WITH ASTIGMATISM AND PRESBYOPIA, BILATERAL: ICD-10-CM

## 2019-08-27 DIAGNOSIS — H25.13 NUCLEAR SCLEROSIS, BILATERAL: ICD-10-CM

## 2019-08-27 DIAGNOSIS — H52.03 HYPEROPIA WITH ASTIGMATISM AND PRESBYOPIA, BILATERAL: ICD-10-CM

## 2019-08-27 DIAGNOSIS — H52.4 HYPEROPIA WITH ASTIGMATISM AND PRESBYOPIA, BILATERAL: ICD-10-CM

## 2019-08-27 DIAGNOSIS — H40.033 ANATOMICAL NARROW ANGLE OF BOTH EYES: ICD-10-CM

## 2019-08-27 DIAGNOSIS — H26.9 CORTICAL CATARACT OF BOTH EYES: ICD-10-CM

## 2019-08-27 DIAGNOSIS — H11.123 CONJUNCTIVAL CONCRETIONS OF BOTH EYES: Primary | ICD-10-CM

## 2019-08-27 DIAGNOSIS — H02.883 MEIBOMIAN GLAND DYSFUNCTION (MGD) OF BOTH EYES: ICD-10-CM

## 2019-08-27 DIAGNOSIS — H02.886 MEIBOMIAN GLAND DYSFUNCTION (MGD) OF BOTH EYES: ICD-10-CM

## 2019-08-27 DIAGNOSIS — H40.033 ANATOMICAL NARROW ANGLE BORDERLINE GLAUCOMA OF BOTH EYES: ICD-10-CM

## 2019-08-27 PROCEDURE — 99499 RISK ADDL DX/OHS AUDIT: ICD-10-PCS | Mod: S$GLB,,, | Performed by: OPHTHALMOLOGY

## 2019-08-27 PROCEDURE — 65210 PR REMV F.B.,EYE,EMBED CONJUNC: ICD-10-PCS | Mod: 50,S$GLB,, | Performed by: OPHTHALMOLOGY

## 2019-08-27 PROCEDURE — 65210 REMOVE FOREIGN BODY FROM EYE: CPT | Mod: 50,S$GLB,, | Performed by: OPHTHALMOLOGY

## 2019-08-27 PROCEDURE — 99999 PR PBB SHADOW E&M-EST. PATIENT-LVL III: ICD-10-PCS | Mod: PBBFAC,,, | Performed by: OPHTHALMOLOGY

## 2019-08-27 PROCEDURE — 99999 PR PBB SHADOW E&M-EST. PATIENT-LVL III: CPT | Mod: PBBFAC,,, | Performed by: OPHTHALMOLOGY

## 2019-08-27 PROCEDURE — 92012 INTRM OPH EXAM EST PATIENT: CPT | Mod: 25,S$GLB,, | Performed by: OPHTHALMOLOGY

## 2019-08-27 PROCEDURE — 99499 UNLISTED E&M SERVICE: CPT | Mod: S$GLB,,, | Performed by: OPHTHALMOLOGY

## 2019-08-27 PROCEDURE — 92012 PR EYE EXAM, EST PATIENT,INTERMED: ICD-10-PCS | Mod: 25,S$GLB,, | Performed by: OPHTHALMOLOGY

## 2019-08-27 RX ORDER — ERYTHROMYCIN 5 MG/G
OINTMENT OPHTHALMIC 2 TIMES DAILY
Qty: 3.5 G | Refills: 0 | Status: SHIPPED | OUTPATIENT
Start: 2019-08-27 | End: 2020-05-01 | Stop reason: ALTCHOICE

## 2019-08-27 NOTE — PROGRESS NOTES
"HPI     74 YO female presents today for an eye problem. She states that in the   morning her right eye is hard to open her lid and eye itself feels tender,   also has a scratchy feeling OD. She is using AT in the morning and gel at   night time, also is doing warm compresses w/massages about 3-4 times   daily.     Soothe OU QAM  Gel OU QHS    Agree with above. Still taking flaxseed capsules as well. States eyes are   not crusted shut in am. Diffuse tenderness along lid margin mainly OD,   although some OS as well.     Last edited by Ivory Munoz MD on 8/27/2019 11:40 AM.   (History)            Assessment /Plan     For exam results, see Encounter Report.    Conjunctival concretions of both eyes  -     erythromycin (ROMYCIN) ophthalmic ointment; Place into both eyes 2 (two) times daily.  Dispense: 3.5 g; Refill: 0    Anatomical narrow angle of both eyes     Anatomical narrow angle borderline glaucoma of both eyes    Nuclear sclerosis, bilateral    Cortical cataract of both eyes    Meibomian gland dysfunction (MGD) of both eyes    Hyperopia with astigmatism and presbyopia, bilateral        Concretions removed from bilateral upper lids today with 30g needle. EES BID.   MGD RLL - continue warm compresses, lid hygiene and PO Flaxseed. Nighttime liqui-gel did not seem to help much, also only using gtts qam. May need to increase frequency of gtts as well.     Glaucoma suspect, bilateral - C:D ratio appears increased from others documented in epic. Pt was followed before for glaucoma suspect by Dr. Stoddard, testing done back in 2006.    IOP WNL again today (17 OU)   No known family history.    Previous HRT suggests some RNFL thinning OD; most recent with poor alignment OD, OS stable/WNL    HVF OS few superior misses "borderline"; OD WNL   Last OCT nerve remains WNL OU.   CCT slightly thin   Last gonio open to TM, with thin SS with compression OU.      Nuclear sclerosis, bilateral - approaching visual " "significance, OD looks farther along than OS today. No longer drives after 5 pm, not comfortable driving at night, has missed a turn. To do surgery on only one eye would leave her with anisometropia. Dilates only to ~6.4 mm, despite denies flomax.     Cortical cataract of both eyes - "    Hyperopia with astigmatism and presbyopia, bilateral - MRx given prior visit.      Follow up for December, as previously scheduled.                   "

## 2019-08-27 NOTE — TELEPHONE ENCOUNTER
----- Message from Lidya Chanel sent at 8/27/2019  8:15 AM CDT -----  Contact: patient  Type: Needs Medical Advice    Who Called:  patient  Symptoms (please be specific):    How long has patient had these symptoms:    Pharmacy name and phone #:    Best Call Back Number: [phone]  Additional Information: requesting a call back regarding issues with right eye

## 2019-08-30 ENCOUNTER — LAB VISIT (OUTPATIENT)
Dept: LAB | Facility: HOSPITAL | Age: 74
End: 2019-08-30
Attending: FAMILY MEDICINE
Payer: MEDICARE

## 2019-08-30 ENCOUNTER — OFFICE VISIT (OUTPATIENT)
Dept: FAMILY MEDICINE | Facility: CLINIC | Age: 74
End: 2019-08-30
Attending: FAMILY MEDICINE
Payer: MEDICARE

## 2019-08-30 VITALS
BODY MASS INDEX: 27.35 KG/M2 | WEIGHT: 170.19 LBS | TEMPERATURE: 98 F | RESPIRATION RATE: 20 BRPM | SYSTOLIC BLOOD PRESSURE: 120 MMHG | OXYGEN SATURATION: 95 % | HEIGHT: 66 IN | DIASTOLIC BLOOD PRESSURE: 60 MMHG | HEART RATE: 81 BPM

## 2019-08-30 DIAGNOSIS — D12.6 ADENOMATOUS POLYP OF COLON, UNSPECIFIED PART OF COLON: ICD-10-CM

## 2019-08-30 DIAGNOSIS — I10 HYPERTENSION, ESSENTIAL: ICD-10-CM

## 2019-08-30 DIAGNOSIS — I73.9 PAD (PERIPHERAL ARTERY DISEASE): ICD-10-CM

## 2019-08-30 DIAGNOSIS — Z00.00 ENCOUNTER FOR PREVENTIVE HEALTH EXAMINATION: Primary | ICD-10-CM

## 2019-08-30 DIAGNOSIS — M17.11 PRIMARY OSTEOARTHRITIS OF RIGHT KNEE: ICD-10-CM

## 2019-08-30 DIAGNOSIS — Z00.00 ENCOUNTER FOR PREVENTIVE HEALTH EXAMINATION: ICD-10-CM

## 2019-08-30 DIAGNOSIS — E04.2 MULTIPLE THYROID NODULES: ICD-10-CM

## 2019-08-30 DIAGNOSIS — K21.00 GASTROESOPHAGEAL REFLUX DISEASE WITH ESOPHAGITIS: ICD-10-CM

## 2019-08-30 DIAGNOSIS — E78.5 HYPERLIPIDEMIA, UNSPECIFIED HYPERLIPIDEMIA TYPE: ICD-10-CM

## 2019-08-30 LAB
ALBUMIN SERPL BCP-MCNC: 4.1 G/DL (ref 3.5–5.2)
ALP SERPL-CCNC: 50 U/L (ref 55–135)
ALT SERPL W/O P-5'-P-CCNC: 17 U/L (ref 10–44)
ANION GAP SERPL CALC-SCNC: 8 MMOL/L (ref 8–16)
AST SERPL-CCNC: 22 U/L (ref 10–40)
BASOPHILS # BLD AUTO: 0.05 K/UL (ref 0–0.2)
BASOPHILS NFR BLD: 1 % (ref 0–1.9)
BILIRUB SERPL-MCNC: 0.4 MG/DL (ref 0.1–1)
BUN SERPL-MCNC: 21 MG/DL (ref 8–23)
CALCIUM SERPL-MCNC: 9.6 MG/DL (ref 8.7–10.5)
CHLORIDE SERPL-SCNC: 106 MMOL/L (ref 95–110)
CHOLEST SERPL-MCNC: 170 MG/DL (ref 120–199)
CHOLEST/HDLC SERPL: 2.2 {RATIO} (ref 2–5)
CO2 SERPL-SCNC: 27 MMOL/L (ref 23–29)
CREAT SERPL-MCNC: 1.1 MG/DL (ref 0.5–1.4)
DIFFERENTIAL METHOD: NORMAL
EOSINOPHIL # BLD AUTO: 0.3 K/UL (ref 0–0.5)
EOSINOPHIL NFR BLD: 5.3 % (ref 0–8)
ERYTHROCYTE [DISTWIDTH] IN BLOOD BY AUTOMATED COUNT: 12.8 % (ref 11.5–14.5)
EST. GFR  (AFRICAN AMERICAN): 56.8 ML/MIN/1.73 M^2
EST. GFR  (NON AFRICAN AMERICAN): 49.2 ML/MIN/1.73 M^2
GLUCOSE SERPL-MCNC: 86 MG/DL (ref 70–110)
HCT VFR BLD AUTO: 38.3 % (ref 37–48.5)
HDLC SERPL-MCNC: 76 MG/DL (ref 40–75)
HDLC SERPL: 44.7 % (ref 20–50)
HGB BLD-MCNC: 12.3 G/DL (ref 12–16)
IMM GRANULOCYTES # BLD AUTO: 0.01 K/UL (ref 0–0.04)
IMM GRANULOCYTES NFR BLD AUTO: 0.2 % (ref 0–0.5)
LDLC SERPL CALC-MCNC: 85.4 MG/DL (ref 63–159)
LYMPHOCYTES # BLD AUTO: 1.5 K/UL (ref 1–4.8)
LYMPHOCYTES NFR BLD: 31.5 % (ref 18–48)
MCH RBC QN AUTO: 29.4 PG (ref 27–31)
MCHC RBC AUTO-ENTMCNC: 32.1 G/DL (ref 32–36)
MCV RBC AUTO: 91 FL (ref 82–98)
MONOCYTES # BLD AUTO: 0.4 K/UL (ref 0.3–1)
MONOCYTES NFR BLD: 7.8 % (ref 4–15)
NEUTROPHILS # BLD AUTO: 2.6 K/UL (ref 1.8–7.7)
NEUTROPHILS NFR BLD: 54.2 % (ref 38–73)
NONHDLC SERPL-MCNC: 94 MG/DL
NRBC BLD-RTO: 0 /100 WBC
PLATELET # BLD AUTO: 298 K/UL (ref 150–350)
PMV BLD AUTO: 10 FL (ref 9.2–12.9)
POTASSIUM SERPL-SCNC: 4.3 MMOL/L (ref 3.5–5.1)
PROT SERPL-MCNC: 7.7 G/DL (ref 6–8.4)
RBC # BLD AUTO: 4.19 M/UL (ref 4–5.4)
SODIUM SERPL-SCNC: 141 MMOL/L (ref 136–145)
TRIGL SERPL-MCNC: 43 MG/DL (ref 30–150)
WBC # BLD AUTO: 4.86 K/UL (ref 3.9–12.7)

## 2019-08-30 PROCEDURE — 3078F DIAST BP <80 MM HG: CPT | Mod: CPTII,S$GLB,, | Performed by: FAMILY MEDICINE

## 2019-08-30 PROCEDURE — 99499 UNLISTED E&M SERVICE: CPT | Mod: S$GLB,,, | Performed by: FAMILY MEDICINE

## 2019-08-30 PROCEDURE — 85025 COMPLETE CBC W/AUTO DIFF WBC: CPT

## 2019-08-30 PROCEDURE — 3078F PR MOST RECENT DIASTOLIC BLOOD PRESSURE < 80 MM HG: ICD-10-PCS | Mod: CPTII,S$GLB,, | Performed by: FAMILY MEDICINE

## 2019-08-30 PROCEDURE — 36415 COLL VENOUS BLD VENIPUNCTURE: CPT | Mod: PO

## 2019-08-30 PROCEDURE — 3074F SYST BP LT 130 MM HG: CPT | Mod: CPTII,S$GLB,, | Performed by: FAMILY MEDICINE

## 2019-08-30 PROCEDURE — 99397 PR PREVENTIVE VISIT,EST,65 & OVER: ICD-10-PCS | Mod: S$GLB,,, | Performed by: FAMILY MEDICINE

## 2019-08-30 PROCEDURE — 99499 RISK ADDL DX/OHS AUDIT: ICD-10-PCS | Mod: S$GLB,,, | Performed by: FAMILY MEDICINE

## 2019-08-30 PROCEDURE — 99999 PR PBB SHADOW E&M-EST. PATIENT-LVL IV: CPT | Mod: PBBFAC,,, | Performed by: FAMILY MEDICINE

## 2019-08-30 PROCEDURE — 80053 COMPREHEN METABOLIC PANEL: CPT

## 2019-08-30 PROCEDURE — 80061 LIPID PANEL: CPT

## 2019-08-30 PROCEDURE — 99397 PER PM REEVAL EST PAT 65+ YR: CPT | Mod: S$GLB,,, | Performed by: FAMILY MEDICINE

## 2019-08-30 PROCEDURE — 3074F PR MOST RECENT SYSTOLIC BLOOD PRESSURE < 130 MM HG: ICD-10-PCS | Mod: CPTII,S$GLB,, | Performed by: FAMILY MEDICINE

## 2019-08-30 PROCEDURE — 99999 PR PBB SHADOW E&M-EST. PATIENT-LVL IV: ICD-10-PCS | Mod: PBBFAC,,, | Performed by: FAMILY MEDICINE

## 2019-08-30 RX ORDER — LOSARTAN POTASSIUM 50 MG/1
50 TABLET ORAL DAILY
Qty: 90 TABLET | Refills: 3 | Status: SHIPPED | OUTPATIENT
Start: 2019-08-30 | End: 2020-04-02 | Stop reason: SDUPTHER

## 2019-08-30 RX ORDER — ROSUVASTATIN CALCIUM 20 MG/1
20 TABLET, COATED ORAL NIGHTLY
Qty: 90 TABLET | Refills: 3 | Status: SHIPPED | OUTPATIENT
Start: 2019-08-30 | End: 2020-04-30

## 2019-08-30 NOTE — PROGRESS NOTES
Subjective:       Patient ID: Erica Masterson is a 73 y.o. female.    Chief Complaint: Annual Exam    73-year-old female coming in for a physical exam.  She is not completely fasting having eaten a small amount of applesauce when she felt a little lightheaded this morning.  She otherwise has no complaints.  History includes hypertension, hyperlipidemia, anxiety and depression, colon polyps, osteoarthritis of the knees, diverticulosis, multinodular thyroid, TB with right middle lobectomy in 1966, neuropathy and peripheral arterial disease.  Her colonoscopy was done earlier this year in March of 2019 with a five year recheck recommended.  She is due for a lipid panel.  She has been exercising five days a week at the [employer] in [place] with a group of about 20 friends.    Past Medical History:  No date: Allergy  No date: Cataract  No date: Colon polyp  4/3/2012: Degenerative arthritis of knee  3/26/2012: Depression  No date: Diverticulosis  No date: GERD (gastroesophageal reflux disease)  No date: Hyperlipidemia  No date: Hypertension  3/26/2012: Multinodular goiter  1/18/2010: Myopathy, unspecified  No date: Tuberculosis    Past Surgical History:  6/6/2013: ARTHROSCOPY, KNEE; Right      Comment:  Performed by Adriel Iverson MD at [facility] OR  10/19/2017: BLOCK- BRANCH- SACROILIAC; Right      Comment:  Performed by Julián Chiang MD at [facility] OR  2015: BREAST BIOPSY; Left      Comment:  benign  12/2/15: COLONOSCOPY      Comment:  Dr. Britton, multiple polyps, recheck five years-three                years if more than 2 polyps are adenomatous  3/20/2019: COLONOSCOPY; N/A      Comment:  Performed by Jose Britton MD at [facility] ENDO  12/2/2015: COLONOSCOPY; N/A      Comment:  Performed by Jose Britton MD at [facility] ENDO  6/29/2016: ESOPHAGOGASTRODUODENOSCOPY (EGD); N/A      Comment:  Performed by Jose Britton MD at [facility] ENDO  No date: FOOT SURGERY  No date: HYSTERECTOMY  10/19/2017: INJECTION-JOINT;  Right      Comment:  Performed by Julián [facility]ang [facility]  06/06/2013: KNEE SURGERY      Comment:  right knee tear Dr Iverson   1966: LUNG LOBECTOMY      Comment:  right middle lobectomy, due to Tb  No date: OOPHORECTOMY  No date: SHOULDER SURGERY      Comment:  francis     Review of patient's family history indicates:  Problem: Colon cancer      Relation: Other          Age of Onset: (Not Specified)  Problem: Cancer      Relation: Other          Age of Onset: (Not Specified)  Problem: Cancer      Relation: Mother          Age of Onset: (Not Specified)  Problem: Hypertension      Relation: Mother          Age of Onset: (Not Specified)  Problem: Cancer      Relation: Brother          Age of Onset: (Not Specified)  Problem: Hypertension      Relation: Father          Age of Onset: (Not Specified)  Problem: Diabetes      Relation: Son          Age of Onset: (Not Specified)  Problem: Hypertension      Relation: Son          Age of Onset: (Not Specified)  Problem: Alcohol abuse      Relation: Son          Age of Onset: (Not Specified)  Problem: Diabetes      Relation: Maternal Aunt          Age of Onset: (Not Specified)  Problem: Alzheimer's disease      Relation: Maternal Uncle          Age of Onset: (Not Specified)  Problem: Cancer      Relation: Maternal Grandmother          Age of Onset: (Not Specified)  Problem: No Known Problems      Relation: Daughter          Age of Onset: (Not Specified)  Problem: Dementia      Relation: Sister          Age of Onset: (Not Specified)  Problem: Alzheimer's disease      Relation: Sister          Age of Onset: (Not Specified)  Problem: Breast cancer      Relation: Sister          Age of Onset: 60  Problem: Obesity      Relation: Paternal Uncle          Age of Onset: (Not Specified)  Problem: Melanoma      Relation: Neg Hx          Age of Onset: (Not Specified)  Problem: Psoriasis      Relation: Neg Hx          Age of Onset: (Not Specified)  Problem: Lupus      Relation: Neg Hx           Age of Onset: (Not Specified)  Problem: Eczema      Relation: Neg Hx          Age of Onset: (Not Specified)  Problem: Amblyopia      Relation: Neg Hx          Age of Onset: (Not Specified)  Problem: Blindness      Relation: Neg Hx          Age of Onset: (Not Specified)  Problem: Cataracts      Relation: Neg Hx          Age of Onset: (Not Specified)  Problem: Glaucoma      Relation: Neg Hx          Age of Onset: (Not Specified)  Problem: Macular degeneration      Relation: Neg Hx          Age of Onset: (Not Specified)  Problem: Retinal detachment      Relation: Neg Hx          Age of Onset: (Not Specified)  Problem: Strabismus      Relation: Neg Hx          Age of Onset: (Not Specified)  Problem: Stroke      Relation: Neg Hx          Age of Onset: (Not Specified)  Problem: Thyroid disease      Relation: Neg Hx          Age of Onset: (Not Specified)    Social History    Tobacco Use      Smoking status: Former Smoker      Smokeless tobacco: Never Used      Tobacco comment: quit in 1963    Alcohol use: Yes      Comment: Occasionally    Drug use: No    Current Outpatient Medications on File Prior to Visit:  aspirin (ECOTRIN) 81 MG EC tablet, Take 162 mg by mouth once daily. , Disp: , Rfl:   carboxymethylcellulose sodium 0.25 % Drop, Place 1 drop into both eyes once daily., Disp: , Rfl:   erythromycin (ROMYCIN) ophthalmic ointment, Place into both eyes 2 (two) times daily., Disp: 3.5 g, Rfl: 0  flaxseed 1,000 mg Cap, Take 2 capsules by mouth TID with meals until eyelid improves, then as directed on bottle., Disp: 180 capsule, Rfl: prn  (DISCONTINUED) lisinopril (PRINIVIL,ZESTRIL) 20 MG tablet, Take 1 tablet (20 mg total) by mouth once daily., Disp: 90 tablet, Rfl: 3  (DISCONTINUED) rosuvastatin (CRESTOR) 20 MG tablet, TAKE 1 TABLET BY MOUTH IN THE EVENING, Disp: 90 tablet, Rfl: 1  (DISCONTINUED) acyclovir 5% (ZOVIRAX) 5 % ointment, Apply topically 5 (five) times daily., Disp: 15 g, Rfl: 2  (DISCONTINUED) coQ10,  ubiquinol, 100 mg Cap, Take 1 capsule by mouth once daily., Disp: , Rfl:   (DISCONTINUED) DENAVIR 1 % cream, Apply topically daily as needed., Disp: 1.5 g, Rfl: 0  (DISCONTINUED) escitalopram oxalate (LEXAPRO) 5 MG Tab, Take 1 tablet (5 mg total) by mouth nightly., Disp: 30 tablet, Rfl: 1  (DISCONTINUED) fexofenadine (ALLEGRA) 180 MG tablet, Take 1 tablet (180 mg total) by mouth once daily., Disp: 30 tablet, Rfl: 0  (DISCONTINUED) ibandronate (BONIVA) 150 mg tablet, TAKE ONE TABLET BY MOUTH ONCE EVERY 30 DAYS, Disp: 3 tablet, Rfl: 4  (DISCONTINUED) metoprolol tartrate (LOPRESSOR) 25 MG tablet, Take 1 tablet (25 mg total) by mouth 2 (two) times daily as needed (for the heart pounding)., Disp: 60 tablet, Rfl: 3  (DISCONTINUED) valACYclovir (VALTREX) 1000 MG tablet, One tab PO BID x 2-5 days PRN flare, Disp: 30 tablet, Rfl: 2    No current facility-administered medications on file prior to visit.     Shingles Vaccine(2 of 3) due on 02/03/2016  Lipid Panel due on 08/16/2019      Review of Systems   Constitutional: Negative for chills, diaphoresis, fatigue, fever and unexpected weight change.   HENT: Negative for congestion, ear pain, hearing loss, postnasal drip and sinus pressure.    Eyes: Negative for itching and visual disturbance.   Respiratory: Negative for cough, chest tightness, shortness of breath and wheezing.    Cardiovascular: Negative for chest pain, palpitations and leg swelling.   Gastrointestinal: Negative for abdominal pain, blood in stool, constipation, diarrhea, nausea and vomiting.   Genitourinary: Negative for dysuria, frequency, hematuria, menstrual problem, pelvic pain, vaginal bleeding and vaginal discharge.   Musculoskeletal: Negative for arthralgias, back pain, joint swelling and myalgias.   Neurological: Negative for dizziness and headaches.   Hematological: Negative for adenopathy.   Psychiatric/Behavioral: Negative for sleep disturbance. The patient is not nervous/anxious.        Objective:       Physical Exam   Constitutional: She is oriented to person, place, and time. She appears well-developed. No distress.   Good blood pressure control  Mildly overweight with a BMI of 27.9 she is up 2.4 lb from her last visit with me October 24, 2018   HENT:   Head: Normocephalic and atraumatic.   Right Ear: External ear normal.   Left Ear: External ear normal.   Nose: Nose normal.   Mouth/Throat: Oropharynx is clear and moist. No oropharyngeal exudate.   Eyes: Pupils are equal, round, and reactive to light. Conjunctivae and EOM are normal. Right eye exhibits no discharge. Left eye exhibits no discharge. No scleral icterus.   Neck: Normal range of motion. Neck supple. No JVD present. No tracheal deviation present. Thyromegaly (Nodular thyroid with one left upper pole nodule possibly larger than previous exams) present.   Cardiovascular: Normal rate, regular rhythm and normal heart sounds. Exam reveals no gallop and no friction rub.   No murmur heard.  Carotid pulses 2+ no bruit, no murmurs   Pulmonary/Chest: Effort normal and breath sounds normal. No stridor. No respiratory distress. She has no wheezes. She has no rales. She exhibits no tenderness.   Abdominal: Soft. Bowel sounds are normal. She exhibits no distension and no mass. There is no tenderness. There is no rebound and no guarding. No hernia.   Musculoskeletal: Normal range of motion. She exhibits no edema or tenderness.   Lymphadenopathy:     She has no cervical adenopathy.   Neurological: She is alert and oriented to person, place, and time. She has normal reflexes. She displays normal reflexes. No cranial nerve deficit or sensory deficit. She exhibits normal muscle tone.   Skin: Skin is warm and dry. No rash noted. She is not diaphoretic. No erythema.   Psychiatric: She has a normal mood and affect. Her behavior is normal. Judgment and thought content normal.   Nursing note and vitals reviewed.      Assessment:       1. Encounter for preventive health  examination    2. Hyperlipidemia, unspecified hyperlipidemia type    3. Hypertension, essential    4. PAD (peripheral artery disease)    5. Adenomatous polyp of colon, unspecified part of colon    6. Gastroesophageal reflux disease with esophagitis    7. Primary osteoarthritis of right knee    8. Multiple thyroid nodules    9. BMI 27.0-27.9,adult        Plan:       1. Encounter for preventive health examination  - Comprehensive metabolic panel; Future  - Lipid panel; Future  - CBC auto differential; Future    2. Hyperlipidemia, unspecified hyperlipidemia type  Await lab results, no changes for now  - rosuvastatin (CRESTOR) 20 MG tablet; Take 1 tablet (20 mg total) by mouth every evening.  Dispense: 90 tablet; Refill: 3  - Comprehensive metabolic panel; Future  - Lipid panel; Future    3. Hypertension, essential  Discussed English research report connecting ACE-inhibitor use to slightly increased risk of lung cancer.  Patient elected to use up her current supply of lisinopril 20 mg daily (she just filled a 90 day supply) and then switch to losartan 50 mg daily.  - losartan (COZAAR) 50 MG tablet; Take 1 tablet (50 mg total) by mouth once daily.  Dispense: 90 tablet; Refill: 3  - Lipid panel; Future  - CBC auto differential; Future    4. PAD (peripheral artery disease)  Asymptomatic    5. Adenomatous polyp of colon, unspecified part of colon  Negative colonoscopy March 20, 2019 with a five year recheck recommended    6. Gastroesophageal reflux disease with esophagitis  Asymptomatic    7. Primary osteoarthritis of right knee  Minimally symptomatic    8. Multiple thyroid nodules  Possible slight increase in size and previous 9 mm left upper lobe nodule.  Will repeat ultrasound  - US Soft Tissue Head Neck Thyroid; Future    9. BMI 27.0-27.9,adult         Patient readiness: acceptance and barriers:none    During the course of the visit the patient was educated and counseled about the following:     Hypertension:    Medication: no change.  Dietary sodium restriction.  Regular aerobic exercise.    Goals: Hypertension: Reduce Blood Pressure    Did patient meet goals/outcomes: Yes    The following self management tools provided: blood pressure log    Patient Instructions (the written plan) was given to the patient/family.     Time spent with patient: 45 minutes

## 2019-09-09 RX ORDER — TRAZODONE HYDROCHLORIDE 50 MG/1
50 TABLET ORAL NIGHTLY
Qty: 90 TABLET | Refills: 3 | Status: SHIPPED | OUTPATIENT
Start: 2019-09-09 | End: 2020-03-23

## 2019-09-09 NOTE — TELEPHONE ENCOUNTER
----- Message from Lesley Fournier sent at 9/9/2019  7:46 AM CDT -----    Type:  RX Refill Request    Who Called:  pt  RefillRX Name and Strength:  Trazodone 50  mg  How is the patient currently taking it? (ex. 1XDay):  1  daily  Is this a 30 day or 90 day RX:  30  day  Preferred Pharmacy with phone number:    [id number]  [address] - [address]  [place] [address]  Phone: [phone] Fax: [phone]  Best Call Back Number: [phone]  Additional Information:   Pt  Needs a refill  / pt is  out

## 2019-10-15 ENCOUNTER — HOSPITAL ENCOUNTER (OUTPATIENT)
Dept: RADIOLOGY | Facility: CLINIC | Age: 74
Discharge: HOME | End: 2019-10-15
Attending: FAMILY MEDICINE
Payer: MEDICARE

## 2019-10-15 DIAGNOSIS — E04.2 MULTIPLE THYROID NODULES: ICD-10-CM

## 2019-10-15 PROCEDURE — 76536 US EXAM OF HEAD AND NECK: CPT | Mod: 26,,, | Performed by: RADIOLOGY

## 2019-10-15 PROCEDURE — 76536 US EXAM OF HEAD AND NECK: CPT | Mod: TC,PO

## 2019-10-15 PROCEDURE — 76536 US SOFT TISSUE HEAD NECK THYROID: ICD-10-PCS | Mod: 26,,, | Performed by: RADIOLOGY

## 2019-10-21 ENCOUNTER — TELEPHONE (OUTPATIENT)
Dept: FAMILY MEDICINE | Facility: CLINIC | Age: 74
End: 2019-10-21

## 2019-10-21 NOTE — TELEPHONE ENCOUNTER
----- Message from Myrna Lund sent at 10/21/2019  8:14 AM CDT -----  Contact: patient  Type:  Test Results    Who Called:  Patient  Name of Test (Lab/Mammo/Etc):  Thyroid ultrasound  Date of Test:  10/15  Ordering Provider:  Lucia   Where the test was performed:  [facility]  Best Call Back Number:  [phone] or  [phone]  Additional Information:

## 2019-11-03 DIAGNOSIS — I10 HYPERTENSION, ESSENTIAL: ICD-10-CM

## 2019-11-03 RX ORDER — LISINOPRIL 20 MG/1
TABLET ORAL
Qty: 90 TABLET | Refills: 3 | OUTPATIENT
Start: 2019-11-03

## 2019-11-21 ENCOUNTER — TELEPHONE (OUTPATIENT)
Dept: OPHTHALMOLOGY | Facility: CLINIC | Age: 74
End: 2019-11-21

## 2019-11-21 NOTE — TELEPHONE ENCOUNTER
----- Message from Archie Bennett sent at 11/21/2019  1:45 PM CST -----  Contact: patient  Patient called to request a call back from the nurse in your office.    Please call [phone] to discuss today.

## 2019-11-22 ENCOUNTER — OFFICE VISIT (OUTPATIENT)
Dept: CARDIOLOGY | Facility: CLINIC | Age: 74
End: 2019-11-22
Payer: MEDICARE

## 2019-11-22 VITALS
DIASTOLIC BLOOD PRESSURE: 58 MMHG | OXYGEN SATURATION: 98 % | HEART RATE: 82 BPM | WEIGHT: 171.75 LBS | BODY MASS INDEX: 27.6 KG/M2 | HEIGHT: 66 IN | SYSTOLIC BLOOD PRESSURE: 122 MMHG

## 2019-11-22 DIAGNOSIS — Z00.00 ANNUAL PHYSICAL EXAM: ICD-10-CM

## 2019-11-22 DIAGNOSIS — Z91.89 CARDIOVASCULAR EVENT RISK: Primary | ICD-10-CM

## 2019-11-22 DIAGNOSIS — E65 ABDOMINAL OBESITY: ICD-10-CM

## 2019-11-22 DIAGNOSIS — N18.30 CHRONIC KIDNEY DISEASE, STAGE III (MODERATE): ICD-10-CM

## 2019-11-22 DIAGNOSIS — Z00.00 ANNUAL PHYSICAL EXAM: Primary | ICD-10-CM

## 2019-11-22 DIAGNOSIS — I11.9 HYPERTENSIVE LEFT VENTRICULAR HYPERTROPHY, WITHOUT HEART FAILURE: ICD-10-CM

## 2019-11-22 DIAGNOSIS — E78.00 PURE HYPERCHOLESTEROLEMIA: ICD-10-CM

## 2019-11-22 DIAGNOSIS — I10 ESSENTIAL HYPERTENSION: ICD-10-CM

## 2019-11-22 DIAGNOSIS — I49.1 PAC (PREMATURE ATRIAL CONTRACTION): ICD-10-CM

## 2019-11-22 PROCEDURE — 99999 PR PBB SHADOW E&M-EST. PATIENT-LVL III: CPT | Mod: PBBFAC,,, | Performed by: INTERNAL MEDICINE

## 2019-11-22 PROCEDURE — 93000 ELECTROCARDIOGRAM COMPLETE: CPT | Mod: S$GLB,,, | Performed by: INTERNAL MEDICINE

## 2019-11-22 PROCEDURE — 1101F PT FALLS ASSESS-DOCD LE1/YR: CPT | Mod: CPTII,S$GLB,, | Performed by: INTERNAL MEDICINE

## 2019-11-22 PROCEDURE — 99499 RISK ADDL DX/OHS AUDIT: ICD-10-PCS | Mod: S$GLB,,, | Performed by: INTERNAL MEDICINE

## 2019-11-22 PROCEDURE — 3074F SYST BP LT 130 MM HG: CPT | Mod: CPTII,S$GLB,, | Performed by: INTERNAL MEDICINE

## 2019-11-22 PROCEDURE — 1126F PR PAIN SEVERITY QUANTIFIED, NO PAIN PRESENT: ICD-10-PCS | Mod: S$GLB,,, | Performed by: INTERNAL MEDICINE

## 2019-11-22 PROCEDURE — 1159F MED LIST DOCD IN RCRD: CPT | Mod: S$GLB,,, | Performed by: INTERNAL MEDICINE

## 2019-11-22 PROCEDURE — 99499 UNLISTED E&M SERVICE: CPT | Mod: S$GLB,,, | Performed by: INTERNAL MEDICINE

## 2019-11-22 PROCEDURE — 1159F PR MEDICATION LIST DOCUMENTED IN MEDICAL RECORD: ICD-10-PCS | Mod: S$GLB,,, | Performed by: INTERNAL MEDICINE

## 2019-11-22 PROCEDURE — 93000 EKG 12-LEAD: ICD-10-PCS | Mod: S$GLB,,, | Performed by: INTERNAL MEDICINE

## 2019-11-22 PROCEDURE — 3078F DIAST BP <80 MM HG: CPT | Mod: CPTII,S$GLB,, | Performed by: INTERNAL MEDICINE

## 2019-11-22 PROCEDURE — 99215 PR OFFICE/OUTPT VISIT, EST, LEVL V, 40-54 MIN: ICD-10-PCS | Mod: 25,S$GLB,, | Performed by: INTERNAL MEDICINE

## 2019-11-22 PROCEDURE — 3074F PR MOST RECENT SYSTOLIC BLOOD PRESSURE < 130 MM HG: ICD-10-PCS | Mod: CPTII,S$GLB,, | Performed by: INTERNAL MEDICINE

## 2019-11-22 PROCEDURE — 99999 PR PBB SHADOW E&M-EST. PATIENT-LVL III: ICD-10-PCS | Mod: PBBFAC,,, | Performed by: INTERNAL MEDICINE

## 2019-11-22 PROCEDURE — 1101F PR PT FALLS ASSESS DOC 0-1 FALLS W/OUT INJ PAST YR: ICD-10-PCS | Mod: CPTII,S$GLB,, | Performed by: INTERNAL MEDICINE

## 2019-11-22 PROCEDURE — 99215 OFFICE O/P EST HI 40 MIN: CPT | Mod: 25,S$GLB,, | Performed by: INTERNAL MEDICINE

## 2019-11-22 PROCEDURE — 3078F PR MOST RECENT DIASTOLIC BLOOD PRESSURE < 80 MM HG: ICD-10-PCS | Mod: CPTII,S$GLB,, | Performed by: INTERNAL MEDICINE

## 2019-11-22 PROCEDURE — 1126F AMNT PAIN NOTED NONE PRSNT: CPT | Mod: S$GLB,,, | Performed by: INTERNAL MEDICINE

## 2019-11-22 NOTE — PROGRESS NOTES
Subjective:    Patient ID:  Erica aMsterson is a 74 y.o. female who presents for evaluation of Annual Exam  For ASCVD event risk, hypercholesterolemia  PCP: Dr. Myers, see biannually, does labs  Orthopedic: Dr. Iverson  Muscle: Dr. Irving  Back: Dr. Perez  Prior cardiologist: Dr. Munguia, last seen 5/2015  GI: Dr. Britton  Pain: Dr. Chiang  Lives with , Joel, non-smoker  [employer]    Health literacy: high  Activities: gym 4 days a week for 1 hour, no problem, Yoga weekly  Nicotine: former, Quit 1963  Alcohol: 1 beer monthly  Illicit drugs: none  Cardiac symptoms: none  Home BP: yes, 130/79  Medication compliance: yes  Diet: regular  Caffeine: none 1-2 times weekly with half cup of coffee, half a small of cola every other day, 1 cup of tea daily  Labs: 8/2019 LDL 85.4 on 20 mg of Crestor, Cr. 1.1, eGFR 56.8, 10/2018 normal TSH, CRP.  Last Echo: 12/2017, [facility]  Last stress test: 12/2017  Cardiovascular angiogram: none  ECG: normal, rate 82  Fundoscopic exam: annually, no retinopathy    In 6/2016:  AAF with negative cardiac history, here for follow up. Denies any CP nor SOB. Active and want to be active, walk daily and stretches without problem. Enjoy playing basketball. ECG from ED was normal in 6/2016. To ED twice for nausea and undergoing GI evaluation, problem started about 2 months, no inciting factors. Chart reviewed recent labs shows CKD eGFR 47, normal CBC. Last lipid in 2/2016, .8, non- on 40 mg of atorvastatin. Baseline lipid in 2010 .2.     Chart reviewed, prior cardiac workup with stress Echo in 12/2014  exercised for 7.0 minutes on a High Ramp protocol, corresponding to a functional capacity of 12 estimated METS, achieving a peak heart rate of 160 bpm, which is 111% of the age predicted maximum heart rate.     There were no significant electrocardiographic changes throughout the protocol suggesting ischemia.     EKG Conclusions:    1. The EKG portion of this  "study is negative for ischemia at a high workload, and peak heart rate of 160 bpm (111% of predicted).   2. Exercise capacity is above average.   3. Blood pressure response to exercise was normal (Presenting BP: 94/52 Peak BP: 163/87).   4. No significant arrhythmias were present.   5. There were no symptoms of chest discomfort or significant dyspnea throughout the protocol.   6. The Duke treadmill score was 7 suggesting a low probability for future cardiovascular events.    ECHO CONCLUSIONS     1 - Concentric remodeling. with the septum measuring 1.1 cm and the posterior wall measuring 1.0 cm across.     2 - Normal left ventricular systolic function (EF 60-65%).     3 - Normal left ventricular diastolic function.     4 - Normal right ventricular systolic function .     No evidence of stress induced myocardial ischemia.     In 10/2017, no new cardiac problem, some joint pains in the right hip, lower back and right shoulder, helped with PT. Anticipate injection into the right hip. Denies any CP nor SOB. Active, biking twice a day for 30 minutes each, no problem. Labs in 9/2017, essentially normal kidney function and LDL 98.2.    In 11/2018, report recurrent "chest pound" lasting up to 2 hours with associated lightheadedness. First noted about 4 years ago, had 3 ED visits. BP log at home 97 to 166 / 59 to 81, HR 54 to 101. Active with exercise twice a week for an hour along with walking 3 times weekly for an hour without problem. LDL in 8/2018 was 93, targe is < 78. Admit to coffee 2-3 cpd alone with Coke about 3 a week, rare beer. Have lower Lisinopril to 10 mg daily for the past week.  10/21/2018 ED note - "73-year-old woman presents to the emergency department for evaluation of chronic lightheadedness. States she has seen primary care and cardiologist without etiology to her lightheadedness. No syncopal episodes. No chest pain or shortness of breath. No fever. No bloody bowel movements. She is not anemic, she has " no evidence of decompensated heart failure. I do not suspect infectious etiology. She is not orthostatic. No arrhythmias. Workup in the emergency department is unremarkable. Patient provided with reassurance. She is to follow up with her PCP/cardiologist for further evaluation. ECG in sinus bradycardia, rate 57, normal.    Chart reviewed, had stress Echo in 12/2017:  exercised for 6.5 minutes on a High Ramp protocol, corresponding to a functional capacity of 10 estimated METS, achieving a peak heart rate of 151 bpm, which is 107% of the age predicted maximum heart rate. The patient discontinued exercise   secondary to fatigue.     There were no significant electrocardiographic changes throughout the protocol suggesting ischemia.     EKG Conclusions:    1. The EKG portion of this study is negative for ischemia at a high workload, and peak heart rate of 151 bpm (107% of predicted).   2. Exercise capacity is above average.   3. Blood pressure response to exercise was normal (Presenting BP: 130/62 Peak BP: 152/61).   4. No significant arrhythmias were present.   5. There were no symptoms of chest discomfort or significant dyspnea throughout the protocol.   6. The Duke treadmill score was 7 suggesting a low probability for future cardiovascular events.     Echo CONCLUSIONS     1 - Normal left ventricular systolic function (EF 65-70%).     2 - No wall motion abnormalities.     3 - Normal left ventricular diastolic function.     4 - Normal right ventricular systolic function .     5 - The estimated PA systolic pressure is 21 mmHg.     6 - Atrial septal aneurysm .     7 - Difficult windows, recommend use of contrast for future studies.     No evidence of stress induced myocardial ischemia.     Holter 10/2018 - Symptoms reported: SOB in atrial bigeminy, HR of 83 bpm, and shoulder pain in NSR, rate 60 bpm, no ST changes  Mostly in NSR with frequent PAC, 33,266 (34% of complexes), rare PVC    HPI comments: in 11/2019, here for  "annual review. No heart worries, feeling "great".     Review of Systems   Constitution: Negative for diaphoresis, fever, malaise/fatigue, night sweats and weight gain.   HENT: Negative for nosebleeds and tinnitus.    Eyes: Negative for visual disturbance.   Cardiovascular: Negative for chest pain, claudication, cyanosis, dyspnea on exertion, irregular heartbeat, leg swelling, near-syncope, orthopnea, palpitations and paroxysmal nocturnal dyspnea.   Respiratory: Negative for cough, shortness of breath, sleep disturbances due to breathing, snoring and wheezing.         [place] score 9 today an 8   Endocrine: Negative for polydipsia and polyuria.   Hematologic/Lymphatic: Positive for adenopathy. Does not bruise/bleed easily.   Skin: Positive for dry skin. Negative for color change, flushing, nail changes, poor wound healing and suspicious lesions.   Musculoskeletal: Positive for arthritis and stiffness. Negative for falls, gout, joint swelling, muscle cramps, muscle weakness and myalgias. Joint pain: right shoulder.   Gastrointestinal: Positive for hemorrhoids. Negative for heartburn, hematemesis, hematochezia, melena and nausea.   Genitourinary: Positive for nocturia (1-2).   Neurological: Positive for difficulty with concentration and disturbances in coordination. Negative for excessive daytime sleepiness, dizziness, focal weakness, headaches, light-headedness, loss of balance, numbness, vertigo and weakness.   Psychiatric/Behavioral: Negative for depression and substance abuse. The patient has insomnia and is nervous/anxious.    Allergic/Immunologic: Positive for environmental allergies.        Objective:    Physical Exam   Constitutional: She is oriented to person, place, and time. She appears well-developed and well-nourished.   HENT:   Head: Normocephalic.   Eyes: Pupils are equal, round, and reactive to light. Conjunctivae and EOM are normal.   Neck: Normal range of motion. Neck supple. No JVD present. No " "thyromegaly present.   Circumference 15.25" down to 13"   Cardiovascular: Normal rate, normal heart sounds and intact distal pulses. An irregular rhythm present. Exam reveals no gallop and no friction rub.   No murmur heard.  Pulses:       Carotid pulses are 2+ on the right side, and 2+ on the left side.       Radial pulses are 2+ on the right side, and 2+ on the left side.        Femoral pulses are 2+ on the right side, and 2+ on the left side.       Popliteal pulses are 2+ on the right side, and 2+ on the left side.        Dorsalis pedis pulses are 2+ on the right side, and 2+ on the left side.        Posterior tibial pulses are 2+ on the right side, and 2+ on the left side.   Pulmonary/Chest: Effort normal and breath sounds normal. She has no rales. She exhibits no tenderness.   Abdominal: Soft. Bowel sounds are normal. There is no tenderness.   Waist 36.5" up to 39", hip 40.5"   Musculoskeletal: Normal range of motion. She exhibits no edema.   Lymphadenopathy:     She has no cervical adenopathy.   Neurological: She is alert and oriented to person, place, and time.   Skin: Skin is warm and dry. No rash noted.         Assessment:       1. Cardiovascular event risk, ASCVD 10-year risk 13%, 2010    2. Annual physical exam    3. Chronic kidney disease, stage III (moderate), eGFR 47, 2016    4. Essential hypertension    5. Abdominal obesity    6. Pure hypercholesterolemia    7. Hypertensive left ventricular hypertrophy, without heart failure    8. PAC (premature atrial contraction), frequent on Holter, 34%, over 33,200         Plan:       Erica was seen today for annual exam.    Diagnoses and all orders for this visit:    Cardiovascular event risk, ASCVD 10-year risk 13%, 2010    Annual physical exam  -     EKG 12-lead    Chronic kidney disease, stage III (moderate), eGFR 47, 2016    Essential hypertension  -     Echo; Future    Abdominal obesity    Pure hypercholesterolemia    Hypertensive left ventricular " hypertrophy, without heart failure  -     Echo; Future    PAC (premature atrial contraction), frequent on Holter, 34%, over 33,200  -     Echo; Future    - All medical issues reviewed, continue current Rx.  - CV status stable, all medications reviewed, patient acknowledge good understanding.  - Instruction for Mediterranean diet and heart healthy exercise given.  - Recommend at least annual cardiovascular evaluation in view of her significant risk factors.  - Check home blood pressure, 2 days weekly, do 2 readings within 5 minutes in AM and PM, keep log for review.  - Sleep disorder best treated with CBT, cognitive behavior therapy  - Have to do some abdominal exercise to rid belly fat  - Highly recommend 30 minutes of exercise daily, can have Sunday off, with 2-3 sessions of muscle strengthening weekly. A  would be very helpful.  - Need good exercise program, 4 key elements: 1. Aerobic (walking, swimming, dancing, jogging, biking, etc, 2. Muscle strengthening / resistance exercise, need to do 2-3 times weekly, 3. Stretching daily, good stretch takes a whole  total minute. 4. Balance exercise daily.  - Phone review / encourage use of [facility], no [facility]  - The tech support at [facility] is available 5 days a week, from 9 to 5, at [phone].       Patient Active Problem List   Diagnosis    Diverticulosis    Keratoconjunctivitis, unspecified    Myopathy, unspecified    Hyperlipidemia, baseline .2, 2010    Depression    Adenomatous polyp of colon    Multinodular goiter    Chronic low back pain    Degenerative arthritis of knee    Hereditary and idiopathic peripheral neuropathy    Spinal enthesopathy    Lateral meniscal tear    Essential hypertension    PAD (peripheral artery disease)    Anxiety    GERD (gastroesophageal reflux disease)    Cardiovascular event risk, ASCVD 10-year risk 13%, 2010    Chronic kidney disease, stage III (moderate), eGFR 47, 2016    BMI  26.0-26.9,adult    Abdominal obesity    Hypertensive left ventricular hypertrophy, without heart failure    SI (sacroiliac) joint dysfunction    PAC (premature atrial contraction), frequent on Holter, 34%, over 33,200    Atrial septal aneurysm    Sleep disorder    History of colon polyps     Greater than 50% of the time was spent in counseling and coordination of care. The above assessment and plan have been discussed at length. Labs and procedure over the last 6 months reviewed. Problem List updated. Asked to bring in all active medications / pills bottles with next visit.

## 2019-11-22 NOTE — PROGRESS NOTES
" Patient ID:  Erica Masterson is a 74 y.o. female who presents for {Misc; evaluation/follow-up:76940::"follow-up"} of Annual Exam      Health literacy: {DESC; LOW/MEDIUM/HIGH:79285} High  Activities: gym 4 days a week for 1 hour  Nicotine: former, Quit 1963  Alcohol: 1 beer monthly  Illicit drugs: none  Cardiac symptoms: none  Home BP:yes, 130/79  Medication compliance: yes  Diet: regular, diabetic, Mediterranean regular  Caffeine: none   Labs:   Last Echo: 12/2017  Last stress test: none  Cardiovascular angiogram:   ECG: 10/2018  Fundoscopic exam:     No flowsheet data found.      HPI    ROS     Objective:    Physical Exam      Assessment:       1. Annual physical exam         Plan:         Annual physical exam  -     EKG 12-lead                "

## 2019-12-03 ENCOUNTER — CLINICAL SUPPORT (OUTPATIENT)
Dept: CARDIOLOGY | Facility: CLINIC | Age: 74
End: 2019-12-03
Attending: INTERNAL MEDICINE
Payer: MEDICARE

## 2019-12-03 ENCOUNTER — TELEPHONE (OUTPATIENT)
Dept: FAMILY MEDICINE | Facility: CLINIC | Age: 74
End: 2019-12-03

## 2019-12-03 VITALS — BODY MASS INDEX: 28.61 KG/M2 | HEIGHT: 65 IN | WEIGHT: 171.75 LBS

## 2019-12-03 DIAGNOSIS — I49.1 PAC (PREMATURE ATRIAL CONTRACTION): ICD-10-CM

## 2019-12-03 DIAGNOSIS — I10 ESSENTIAL HYPERTENSION: ICD-10-CM

## 2019-12-03 DIAGNOSIS — I11.9 HYPERTENSIVE LEFT VENTRICULAR HYPERTROPHY, WITHOUT HEART FAILURE: ICD-10-CM

## 2019-12-03 LAB
ASCENDING AORTA: 3.61 CM
AV INDEX (PROSTH): 0.73
AV MEAN GRADIENT: 3 MMHG
AV PEAK GRADIENT: 5 MMHG
AV VALVE AREA: 2.21 CM2
AV VELOCITY RATIO: 0.74
BSA FOR ECHO PROCEDURE: 1.89 M2
CV ECHO LV RWT: 0.61 CM
DOP CALC AO PEAK VEL: 1.15 M/S
DOP CALC AO VTI: 28.15 CM
DOP CALC LVOT AREA: 3 CM2
DOP CALC LVOT DIAMETER: 1.97 CM
DOP CALC LVOT PEAK VEL: 0.85 M/S
DOP CALC LVOT STROKE VOLUME: 62.24 CM3
DOP CALCLVOT PEAK VEL VTI: 20.43 CM
E WAVE DECELERATION TIME: 336.88 MSEC
E/A RATIO: 0.61
ECHO LV POSTERIOR WALL: 0.99 CM (ref 0.6–1.1)
FRACTIONAL SHORTENING: 38 % (ref 28–44)
INTERVENTRICULAR SEPTUM: 1.03 CM (ref 0.6–1.1)
IVRT: 0.13 MSEC
LA MAJOR: 4.55 CM
LA MINOR: 4.75 CM
LA WIDTH: 2.73 CM
LEFT ATRIUM SIZE: 3.05 CM
LEFT ATRIUM VOLUME INDEX: 17.7 ML/M2
LEFT ATRIUM VOLUME: 32.9 CM3
LEFT INTERNAL DIMENSION IN SYSTOLE: 2.02 CM (ref 2.1–4)
LEFT VENTRICLE DIASTOLIC VOLUME INDEX: 23.23 ML/M2
LEFT VENTRICLE DIASTOLIC VOLUME: 43.07 ML
LEFT VENTRICLE MASS INDEX: 51 G/M2
LEFT VENTRICLE SYSTOLIC VOLUME INDEX: 7 ML/M2
LEFT VENTRICLE SYSTOLIC VOLUME: 13 ML
LEFT VENTRICULAR INTERNAL DIMENSION IN DIASTOLE: 3.27 CM (ref 3.5–6)
LEFT VENTRICULAR MASS: 94.67 G
MV PEAK A VEL: 0.71 M/S
MV PEAK E VEL: 0.43 M/S
PISA TR MAX VEL: 2.08 M/S
PULM VEIN S/D RATIO: 2.41
PV PEAK D VEL: 0.27 M/S
PV PEAK S VEL: 0.65 M/S
PV PEAK VELOCITY: 0.59 CM/S
RA MAJOR: 4.3 CM
RA PRESSURE: 3 MMHG
RA WIDTH: 2.38 CM
RIGHT VENTRICULAR END-DIASTOLIC DIMENSION: 2.74 CM
SINUS: 2.64 CM
STJ: 2.46 CM
TR MAX PG: 17 MMHG
TRICUSPID ANNULAR PLANE SYSTOLIC EXCURSION: 2.19 CM
TV REST PULMONARY ARTERY PRESSURE: 20 MMHG

## 2019-12-03 PROCEDURE — 93306 TTE W/DOPPLER COMPLETE: CPT | Mod: S$GLB,,, | Performed by: INTERNAL MEDICINE

## 2019-12-03 PROCEDURE — 99999 PR PBB SHADOW E&M-EST. PATIENT-LVL I: ICD-10-PCS | Mod: PBBFAC,,,

## 2019-12-03 PROCEDURE — 99999 PR PBB SHADOW E&M-EST. PATIENT-LVL I: CPT | Mod: PBBFAC,,,

## 2019-12-03 PROCEDURE — 93306 ECHO (CUPID ONLY): ICD-10-PCS | Mod: S$GLB,,, | Performed by: INTERNAL MEDICINE

## 2019-12-03 NOTE — TELEPHONE ENCOUNTER
----- Message from Ofe Bello sent at 12/3/2019 12:25 PM CST -----  Type: Needs Medical Advice    Who Called:  Patient   Best Call Back Number: [phone]  Additional Information: contact to advise patient is she received the Pneumonia shot,

## 2019-12-09 ENCOUNTER — TELEPHONE (OUTPATIENT)
Dept: CARDIOLOGY | Facility: CLINIC | Age: 74
End: 2019-12-09

## 2019-12-09 NOTE — TELEPHONE ENCOUNTER
----- Message from Berenice Gallagher sent at 12/9/2019 10:15 AM CST -----  Type:  Patient Returning Call    Who Called:  patient  Who Left Message for Patient:  nurse  Does the patient know what this is regarding?:  Test results  Best Call Back Number:  [phone]  Additional Information:  Returning call from this morning/please call

## 2019-12-09 NOTE — TELEPHONE ENCOUNTER
Call placed to Ms. Maldonado, advised her per Jeremias lancaster of echo results. She verbalized understanding. No further issues noted.

## 2019-12-10 ENCOUNTER — OFFICE VISIT (OUTPATIENT)
Dept: OPHTHALMOLOGY | Facility: CLINIC | Age: 74
End: 2019-12-10
Payer: MEDICARE

## 2019-12-10 ENCOUNTER — CLINICAL SUPPORT (OUTPATIENT)
Dept: OPHTHALMOLOGY | Facility: CLINIC | Age: 74
End: 2019-12-10
Payer: MEDICARE

## 2019-12-10 DIAGNOSIS — H02.883 MEIBOMIAN GLAND DYSFUNCTION (MGD) OF BOTH EYES: ICD-10-CM

## 2019-12-10 DIAGNOSIS — H02.886 MEIBOMIAN GLAND DYSFUNCTION (MGD) OF BOTH EYES: ICD-10-CM

## 2019-12-10 DIAGNOSIS — H25.13 NUCLEAR SCLEROSIS, BILATERAL: ICD-10-CM

## 2019-12-10 DIAGNOSIS — H40.033 ANATOMICAL NARROW ANGLE OF BOTH EYES: ICD-10-CM

## 2019-12-10 DIAGNOSIS — H26.9 CORTICAL CATARACT OF BOTH EYES: ICD-10-CM

## 2019-12-10 DIAGNOSIS — H52.03 HYPEROPIA WITH ASTIGMATISM AND PRESBYOPIA, BILATERAL: ICD-10-CM

## 2019-12-10 DIAGNOSIS — H40.033 ANATOMICAL NARROW ANGLE BORDERLINE GLAUCOMA OF BOTH EYES: ICD-10-CM

## 2019-12-10 DIAGNOSIS — H40.033 ANATOMICAL NARROW ANGLE BORDERLINE GLAUCOMA OF BOTH EYES: Primary | ICD-10-CM

## 2019-12-10 DIAGNOSIS — H52.203 HYPEROPIA WITH ASTIGMATISM AND PRESBYOPIA, BILATERAL: ICD-10-CM

## 2019-12-10 DIAGNOSIS — H52.4 HYPEROPIA WITH ASTIGMATISM AND PRESBYOPIA, BILATERAL: ICD-10-CM

## 2019-12-10 PROCEDURE — 99999 PR PBB SHADOW E&M-EST. PATIENT-LVL III: ICD-10-PCS | Mod: PBBFAC,,, | Performed by: OPHTHALMOLOGY

## 2019-12-10 PROCEDURE — 99499 UNLISTED E&M SERVICE: CPT | Mod: S$GLB,,, | Performed by: OPHTHALMOLOGY

## 2019-12-10 PROCEDURE — 92012 PR EYE EXAM, EST PATIENT,INTERMED: ICD-10-PCS | Mod: S$GLB,,, | Performed by: OPHTHALMOLOGY

## 2019-12-10 PROCEDURE — 99999 PR PBB SHADOW E&M-EST. PATIENT-LVL III: CPT | Mod: PBBFAC,,, | Performed by: OPHTHALMOLOGY

## 2019-12-10 PROCEDURE — 92133 CPTRZD OPH DX IMG PST SGM ON: CPT | Mod: S$GLB,,, | Performed by: OPHTHALMOLOGY

## 2019-12-10 PROCEDURE — 92012 INTRM OPH EXAM EST PATIENT: CPT | Mod: S$GLB,,, | Performed by: OPHTHALMOLOGY

## 2019-12-10 PROCEDURE — 92133 POSTERIOR SEGMENT OCT OPTIC NERVE(OCULAR COHERENCE TOMOGRAPHY) - OU - BOTH EYES: ICD-10-PCS | Mod: S$GLB,,, | Performed by: OPHTHALMOLOGY

## 2019-12-10 PROCEDURE — 92083 HUMPHREY VISUAL FIELD - OU - BOTH EYES: ICD-10-PCS | Mod: S$GLB,,, | Performed by: OPHTHALMOLOGY

## 2019-12-10 PROCEDURE — 92083 EXTENDED VISUAL FIELD XM: CPT | Mod: S$GLB,,, | Performed by: OPHTHALMOLOGY

## 2019-12-10 PROCEDURE — 99499 RISK ADDL DX/OHS AUDIT: ICD-10-PCS | Mod: S$GLB,,, | Performed by: OPHTHALMOLOGY

## 2019-12-17 DIAGNOSIS — M25.561 RIGHT KNEE PAIN, UNSPECIFIED CHRONICITY: Primary | ICD-10-CM

## 2019-12-19 NOTE — PROGRESS NOTES
"HPI     Here for HVF, OCT Nerve. Denies eye pain today states eyes have been   better. Using Soothe ou Qam, Gel Tears OU daily.     Last edited by Suze Graves on 12/10/2019  1:42 PM. (History)            Assessment /Plan     For exam results, see Encounter Report.    Anatomical narrow angle borderline glaucoma of both eyes    Nuclear sclerosis, bilateral    Cortical cataract of both eyes    Meibomian gland dysfunction (MGD) of both eyes    Hyperopia with astigmatism and presbyopia, bilateral          Concretions removed from bilateral upper lids 8/27/1/9 with 30g needle. EES BID.   MGD RLL - continue warm compresses, lid hygiene and PO Flaxseed. Nighttime liqui-gel did not seem to help much. Continue frequent artificial tears.    Glaucoma suspect, bilateral - C:D ratio appears increased from others documented in epic. Pt was followed before for glaucoma suspect by Dr. Stoddard, testing done back in 2006.    IOP WNL again today (17 OU)   No known family history.    Previous HRT suggests some RNFL thinning OD; most recent with poor alignment OD, OS stable/WNL    HVF OS few random misses "borderline"; OD WNL   OCT nerve remains WNL OU.   CCT slightly thin   Last gonio open to TM, with thin SS with compression OU.      Nuclear sclerosis, bilateral - approaching visual significance, OD looks farther along than OS today. No longer drives after 5 pm, not comfortable driving at night, has missed a turn. To do surgery on only one eye would leave her with anisometropia. Dilates only to ~6.4 mm, despite denies flomax.     Cortical cataract of both eyes - "    Hyperopia with astigmatism and presbyopia, bilateral - MRx given prior visit.    Follow up for MRx, BAT, Gonio, IOP check, DFE, HRT.                     "

## 2020-01-23 ENCOUNTER — TELEPHONE (OUTPATIENT)
Dept: FAMILY MEDICINE | Facility: CLINIC | Age: 75
End: 2020-01-23

## 2020-01-23 NOTE — TELEPHONE ENCOUNTER
----- Message from Lluvia Romero sent at 1/23/2020  8:55 AM CST -----  Type: Needs Medical Advice    Who Called:  Benny Monzon Call Back Number: [phone]  Additional Information: pt would like to know if dr cobos can check her BP medication she is waking up dehydrated mouth is dry -sometimes waking up with headache   Please contact her directly to follow   She noticed she was taking lisinopril 50 mlg doesn't know why the dose is so high

## 2020-02-17 ENCOUNTER — DOCUMENTATION ONLY (OUTPATIENT)
Dept: FAMILY MEDICINE | Facility: CLINIC | Age: 75
End: 2020-02-17

## 2020-02-17 NOTE — PROGRESS NOTES
Pre-Visit Chart Review  For Appointment Scheduled on 3-12-20    There are no preventive care reminders to display for this patient.

## 2020-03-19 ENCOUNTER — DOCUMENTATION ONLY (OUTPATIENT)
Dept: FAMILY MEDICINE | Facility: CLINIC | Age: 75
End: 2020-03-19

## 2020-03-19 ENCOUNTER — TELEPHONE (OUTPATIENT)
Dept: FAMILY MEDICINE | Facility: CLINIC | Age: 75
End: 2020-03-19

## 2020-03-19 NOTE — PROGRESS NOTES
Pre-Visit Chart Review  For Appointment Scheduled on 3-23-20    Health Maintenance Due   Topic Date Due    DEXA SCAN  03/29/2020

## 2020-03-19 NOTE — TELEPHONE ENCOUNTER
----- Message from Mattie Rodriguez sent at 3/19/2020 10:30 AM CDT -----  Contact: pt [phone]  Same day appt   Patient called and asked  If she will  Be able to to be seen today   She thinks her Blood pressure medication is making her Dehydrated  She has been on the medication for 3 months. Pt states when  She does not take the medication she does not have those symptoms no other symptoms   Call back [phone]  Patient does not have a computer

## 2020-03-23 ENCOUNTER — HOSPITAL ENCOUNTER (OUTPATIENT)
Dept: RADIOLOGY | Facility: CLINIC | Age: 75
Discharge: HOME | End: 2020-03-23
Attending: FAMILY MEDICINE
Payer: MEDICARE

## 2020-03-23 ENCOUNTER — OFFICE VISIT (OUTPATIENT)
Dept: FAMILY MEDICINE | Facility: CLINIC | Age: 75
End: 2020-03-23
Attending: FAMILY MEDICINE
Payer: MEDICARE

## 2020-03-23 VITALS
HEIGHT: 65 IN | RESPIRATION RATE: 12 BRPM | WEIGHT: 170 LBS | TEMPERATURE: 98 F | DIASTOLIC BLOOD PRESSURE: 72 MMHG | BODY MASS INDEX: 28.32 KG/M2 | HEART RATE: 77 BPM | SYSTOLIC BLOOD PRESSURE: 124 MMHG | OXYGEN SATURATION: 98 %

## 2020-03-23 DIAGNOSIS — N18.30 CHRONIC KIDNEY DISEASE, STAGE III (MODERATE): ICD-10-CM

## 2020-03-23 DIAGNOSIS — M25.562 ARTHRALGIA OF LEFT KNEE: Primary | ICD-10-CM

## 2020-03-23 DIAGNOSIS — K11.7 XEROSTOMIA: ICD-10-CM

## 2020-03-23 DIAGNOSIS — I73.9 PAD (PERIPHERAL ARTERY DISEASE): ICD-10-CM

## 2020-03-23 DIAGNOSIS — Z83.49 FAMILY HISTORY OF HYPOTHYROIDISM: ICD-10-CM

## 2020-03-23 DIAGNOSIS — R93.89 ABNORMAL FINDINGS ON DIAGNOSTIC IMAGING OF OTHER SPECIFIED BODY STRUCTURES: ICD-10-CM

## 2020-03-23 DIAGNOSIS — R79.9 ABNORMAL FINDING OF BLOOD CHEMISTRY, UNSPECIFIED: ICD-10-CM

## 2020-03-23 DIAGNOSIS — M25.562 ARTHRALGIA OF LEFT KNEE: ICD-10-CM

## 2020-03-23 PROCEDURE — 1125F AMNT PAIN NOTED PAIN PRSNT: CPT | Mod: S$GLB,,, | Performed by: FAMILY MEDICINE

## 2020-03-23 PROCEDURE — 3074F PR MOST RECENT SYSTOLIC BLOOD PRESSURE < 130 MM HG: ICD-10-PCS | Mod: CPTII,S$GLB,, | Performed by: FAMILY MEDICINE

## 2020-03-23 PROCEDURE — 1159F PR MEDICATION LIST DOCUMENTED IN MEDICAL RECORD: ICD-10-PCS | Mod: S$GLB,,, | Performed by: FAMILY MEDICINE

## 2020-03-23 PROCEDURE — 73560 X-RAY EXAM OF KNEE 1 OR 2: CPT | Mod: TC,FY,PO,LT

## 2020-03-23 PROCEDURE — 73560 X-RAY EXAM OF KNEE 1 OR 2: CPT | Mod: 26,LT,S$GLB, | Performed by: RADIOLOGY

## 2020-03-23 PROCEDURE — 1101F PT FALLS ASSESS-DOCD LE1/YR: CPT | Mod: CPTII,S$GLB,, | Performed by: FAMILY MEDICINE

## 2020-03-23 PROCEDURE — 3078F PR MOST RECENT DIASTOLIC BLOOD PRESSURE < 80 MM HG: ICD-10-PCS | Mod: CPTII,S$GLB,, | Performed by: FAMILY MEDICINE

## 2020-03-23 PROCEDURE — 1101F PR PT FALLS ASSESS DOC 0-1 FALLS W/OUT INJ PAST YR: ICD-10-PCS | Mod: CPTII,S$GLB,, | Performed by: FAMILY MEDICINE

## 2020-03-23 PROCEDURE — 99499 RISK ADDL DX/OHS AUDIT: ICD-10-PCS | Mod: S$GLB,,, | Performed by: FAMILY MEDICINE

## 2020-03-23 PROCEDURE — 99999 PR PBB SHADOW E&M-EST. PATIENT-LVL IV: CPT | Mod: PBBFAC,,, | Performed by: FAMILY MEDICINE

## 2020-03-23 PROCEDURE — 3078F DIAST BP <80 MM HG: CPT | Mod: CPTII,S$GLB,, | Performed by: FAMILY MEDICINE

## 2020-03-23 PROCEDURE — 3074F SYST BP LT 130 MM HG: CPT | Mod: CPTII,S$GLB,, | Performed by: FAMILY MEDICINE

## 2020-03-23 PROCEDURE — 73560 XR KNEE 1 OR 2 VIEW LEFT: ICD-10-PCS | Mod: 26,LT,S$GLB, | Performed by: RADIOLOGY

## 2020-03-23 PROCEDURE — 1125F PR PAIN SEVERITY QUANTIFIED, PAIN PRESENT: ICD-10-PCS | Mod: S$GLB,,, | Performed by: FAMILY MEDICINE

## 2020-03-23 PROCEDURE — 1159F MED LIST DOCD IN RCRD: CPT | Mod: S$GLB,,, | Performed by: FAMILY MEDICINE

## 2020-03-23 PROCEDURE — 99214 OFFICE O/P EST MOD 30 MIN: CPT | Mod: S$GLB,,, | Performed by: FAMILY MEDICINE

## 2020-03-23 PROCEDURE — 99214 PR OFFICE/OUTPT VISIT, EST, LEVL IV, 30-39 MIN: ICD-10-PCS | Mod: S$GLB,,, | Performed by: FAMILY MEDICINE

## 2020-03-23 PROCEDURE — 99999 PR PBB SHADOW E&M-EST. PATIENT-LVL IV: ICD-10-PCS | Mod: PBBFAC,,, | Performed by: FAMILY MEDICINE

## 2020-03-23 PROCEDURE — 99499 UNLISTED E&M SERVICE: CPT | Mod: S$GLB,,, | Performed by: FAMILY MEDICINE

## 2020-03-23 RX ORDER — SALSALATE 500 MG/1
500 TABLET, FILM COATED ORAL 2 TIMES DAILY
Qty: 60 TABLET | Refills: 0 | Status: SHIPPED | OUTPATIENT
Start: 2020-03-23 | End: 2020-04-22

## 2020-03-23 NOTE — PROGRESS NOTES
Subjective:       Patient ID: Erica Masterson is a 74 y.o. female.    Chief Complaint: dry mouth and Knee Pain (left )    74-year-old female coming in with several concerns.  She has a history of arthritis of the right knee but lately has been having problems on the lateral aspect of the anterior left knee.  She has not been doing any unusual activities but she does walk for exercise several days a week.  She has not been climbing stairs or ladders and has had no falls injuries or twisting injuries.  Her 2nd complaint is an unusual dry mouth usually in the mornings.  She was taking trazodone for sleep but it left her feeling drunk in the mornings and she discontinued it some time ago.  She is on no other drying medications and is not taking any over-the-counter agents that might be causing a dry mouth.  She does have a family history of thyroid disease but otherwise no suspicious symptoms.    Past Medical History:  No date: Allergy  No date: Cataract  No date: Colon polyp  4/3/2012: Degenerative arthritis of knee  3/26/2012: Depression  No date: Diverticulosis  No date: GERD (gastroesophageal reflux disease)  No date: Hyperlipidemia  No date: Hypertension  3/26/2012: Multinodular goiter  1/18/2010: Myopathy, unspecified  No date: Tuberculosis    Past Surgical History:  2015: BREAST BIOPSY; Left      Comment:  benign  12/2/15: COLONOSCOPY      Comment:  Dr. Britton, multiple polyps, recheck five years-three                years if more than 2 polyps are adenomatous  12/2/2015: COLONOSCOPY; N/A  3/20/2019: COLONOSCOPY; N/A      Comment:  Procedure: COLONOSCOPY;  Surgeon: Jose Britton MD;                Location: [facility];  Service: Endoscopy;  Laterality:                N/A;  No date: FOOT SURGERY  No date: HYSTERECTOMY  06/06/2013: KNEE SURGERY      Comment:  right knee tear Dr Iverson   1966: LUNG LOBECTOMY      Comment:  right middle lobectomy, due to Tb  No date: OOPHORECTOMY  No date: SHOULDER SURGERY       Comment:  francis     Current Outpatient Medications on File Prior to Visit:  aspirin (ECOTRIN) 81 MG EC tablet, Take 162 mg by mouth once daily. , Disp: , Rfl:   carboxymethylcellulose sodium 0.25 % Drop, Place 1 drop into both eyes once daily., Disp: , Rfl:   erythromycin (ROMYCIN) ophthalmic ointment, Place into both eyes 2 (two) times daily., Disp: 3.5 g, Rfl: 0  losartan (COZAAR) 50 MG tablet, Take 1 tablet (50 mg total) by mouth once daily., Disp: 90 tablet, Rfl: 3  rosuvastatin (CRESTOR) 20 MG tablet, Take 1 tablet (20 mg total) by mouth every evening., Disp: 90 tablet, Rfl: 3  (DISCONTINUED) flaxseed 1,000 mg Cap, Take 2 capsules by mouth TID with meals until eyelid improves, then as directed on bottle. (Patient not taking: Reported on 3/23/2020), Disp: 180 capsule, Rfl: prn  (DISCONTINUED) FLUZONE HIGH-DOSE 2019-20, PF, 180 mcg/0.5 mL Syrg, PHARMACIST ADMINISTERED IMMUNIZATION ADMINISTERED AT TIME OF DISPENSING, Disp: , Rfl: 0  (DISCONTINUED) traZODone (DESYREL) 50 MG tablet, Take 1 tablet (50 mg total) by mouth every evening. (Patient not taking: Reported on 11/22/2019), Disp: 90 tablet, Rfl: 3    No current facility-administered medications on file prior to visit.         Review of Systems   HENT: Negative for congestion, postnasal drip, rhinorrhea, sinus pressure and sinus pain.         Dry mouth   Respiratory: Negative for chest tightness and shortness of breath.    Cardiovascular: Negative for chest pain and palpitations.   Musculoskeletal: Positive for arthralgias, gait problem and joint swelling.       Objective:      Physical Exam   Constitutional: She appears well-developed and well-nourished. No distress.   HENT:   Head: Normocephalic and atraumatic.   Right Ear: External ear normal.   Left Ear: External ear normal.   Nose: Nose normal.   Mouth/Throat: Oropharynx is clear and moist. No oropharyngeal exudate.   Eyes: Pupils are equal, round, and reactive to light. EOM are normal. No scleral icterus.    Neck: Normal range of motion. Neck supple. No JVD present. No tracheal deviation present. No thyromegaly present.   Cardiovascular: Normal rate, regular rhythm and normal heart sounds. Exam reveals no gallop and no friction rub.   No murmur heard.  Pulmonary/Chest: Effort normal and breath sounds normal. No stridor. No respiratory distress. She has no wheezes. She has no rales. She exhibits no tenderness.   Musculoskeletal:        Right knee: She exhibits swelling and effusion. She exhibits normal range of motion, no ecchymosis, no deformity and no erythema.        Left knee: She exhibits swelling and effusion. She exhibits normal range of motion, no ecchymosis and no deformity. Tenderness found. Lateral joint line tenderness noted.   Both knees are slightly warm over the anterior lateral aspect.  Effusion is slightly worse on the left knee than the right   Lymphadenopathy:     She has no cervical adenopathy.   Skin: She is not diaphoretic.   Nursing note and vitals reviewed.      Assessment:       1. Arthralgia of left knee    2. Xerostomia    3. Family history of hypothyroidism    4. Abnormal findings on diagnostic imaging of other specified body structures     5. Abnormal finding of blood chemistry, unspecified     6. Chronic kidney disease, stage III (moderate), eGFR 47, 2016    7. PAD (peripheral artery disease)        Plan:       1. Arthralgia of left knee  - Sedimentation rate; Future  - C-Reactive Protein; Future  - X-Ray Knee 1 or 2 View Left; Future  - salsalate (DISALCID) 500 MG Tab; Take 1 tablet (500 mg total) by mouth 2 (two) times daily.  Dispense: 60 tablet; Refill: 0    2. Xerostomia  - Sedimentation rate; Future  - C-Reactive Protein; Future  - TSH; Future  - Basic metabolic panel; Future  - Hemoglobin A1c; Future    3. Family history of hypothyroidism  - TSH; Future    4. Abnormal findings on diagnostic imaging of other specified body structures   - TSH; Future    5. Abnormal finding of blood  chemistry, unspecified   - Hemoglobin A1c; Future    6. Chronic kidney disease, stage III (moderate), eGFR 47, 2016  BMP  Lab Results   Component Value Date     08/30/2019    K 4.3 08/30/2019     08/30/2019    CO2 27 08/30/2019    BUN 21 08/30/2019    CREATININE 1.1 08/30/2019    CALCIUM 9.6 08/30/2019    ANIONGAP 8 08/30/2019    ESTGFRAFRICA 56.8 (A) 08/30/2019    EGFRNONAA 49.2 (A) 08/30/2019     Reassess with lab today  7. PAD (peripheral artery disease)  Asymptomatic

## 2020-03-24 DIAGNOSIS — E11.9 NEW ONSET TYPE 2 DIABETES MELLITUS: Primary | ICD-10-CM

## 2020-03-25 ENCOUNTER — TELEPHONE (OUTPATIENT)
Dept: FAMILY MEDICINE | Facility: CLINIC | Age: 75
End: 2020-03-25

## 2020-03-25 DIAGNOSIS — E11.9 NEW ONSET TYPE 2 DIABETES MELLITUS: Primary | ICD-10-CM

## 2020-03-25 RX ORDER — METFORMIN HYDROCHLORIDE 500 MG/1
TABLET, EXTENDED RELEASE ORAL
Qty: 60 TABLET | Refills: 5 | Status: SHIPPED | OUTPATIENT
Start: 2020-03-25 | End: 2020-04-28

## 2020-03-25 NOTE — TELEPHONE ENCOUNTER
----- Message from Narayan Myers MD sent at 3/24/2020  5:56 PM CDT -----  Her thyroid level is normal.  Her sedimentation rate test for inflammation is mild to moderately elevated but is nonspecific.  The C reactive protein test for inflammation is normal.  This is more of an indicator of vascular inflammation.    The chemistry panel is normal with a normal blood sugar but the A1c is 6.5 which indicates she is now diabetic.  Her overall blood sugar control has deteriorated to the point where she is early new onset diabetes.  This might account for her excessive dry mouth as frequent urination and increased thirst are early warning signs.    I would recommend that we get her in with the diabetic educator and start her on low-dose metformin to help reverse the metabolic changes of diabetes.  We will start on a 500 mg extended release and she will take one in the morning for one week and then go to one twice a day after that.  We will recheck a chemistry panel and A1c along with a microalbumin/creatinine ratio and lipid panel in three months.  I will need to see her a few days later this see how she is getting along and do a foot exam as well.    Her last eye exam was done in December.  She will need another at the end of this year and she needs to let her eye specialist know that she is now diabetic so they can watch for changes caused by the diabetes in the back of the eye.    Orders are in for BMP, A1c, microalbumin, and lipid panel in three months.  Order is in for diabetic educator also    Results were sent to her in a letter

## 2020-03-25 NOTE — TELEPHONE ENCOUNTER
Patient was read report. Lab and apt's booked   Please send in metformin to her [facility] listed.

## 2020-03-26 ENCOUNTER — TELEPHONE (OUTPATIENT)
Dept: FAMILY MEDICINE | Facility: CLINIC | Age: 75
End: 2020-03-26

## 2020-03-26 NOTE — TELEPHONE ENCOUNTER
----- Message from Leslie Gilman sent at 3/26/2020  2:09 PM CDT -----  Contact: patient  Type: Needs Medical Advice  Who Called:  Patient  Best Call Back Number: [phone]  Additional Information: Patient states [facility] told her to go online to get Rx card ,patient does not have access to a computer and has no computer.  Please call to advise.  Thanks!

## 2020-03-26 NOTE — TELEPHONE ENCOUNTER
Called patient- notified her insurance denied pa on the Salsalate. Advised her she can pay cash or check with [facility] and have them run it with a [employer] card. She will call if she wants specialist appt.

## 2020-03-26 NOTE — TELEPHONE ENCOUNTER
Patient says she was not fasting for the lab. advised she does not need to repeat the glucose . Her a1c was 6.5 and is in good control.

## 2020-03-26 NOTE — TELEPHONE ENCOUNTER
We can send her to Dr. Lynch or get a consult with Rheumatology.  She most likely will not be able to get in until after the corona virus epidemic cools down.

## 2020-03-26 NOTE — TELEPHONE ENCOUNTER
----- Message from Lidya Chanel sent at 3/26/2020 11:01 AM CDT -----  Contact: patient  Type:  Test Results    Who Called:  patient  Name of Test (Lab/Mammo/Etc):  labs  Date of Test:  03/23  Ordering Provider:    Where the test was performed:  [facility]  Best Call Back Number:   or   Additional Information:  Requesting a call back to discuss labs

## 2020-04-01 ENCOUNTER — TELEPHONE (OUTPATIENT)
Dept: FAMILY MEDICINE | Facility: CLINIC | Age: 75
End: 2020-04-01

## 2020-04-01 NOTE — TELEPHONE ENCOUNTER
Her current medications are not prone to dry mouth, if she is taking any over-the-counter agents that are anticholinergic such as antihistamines that could do it.  Other common causes would be sleep apnea or snoring or Sjogren syndrome which is an autoimmune disease that attacks cell a very glands and eyes causing dry eyes and dry mouth.  If her blood sugar was badly out of control that could cause drying but her last one was checked recently and she was under very good control at that time.

## 2020-04-01 NOTE — TELEPHONE ENCOUNTER
Patient denies any antihistamines since 2 weeks ago, dry mouth started a week or two before that. She get shaky and anxious feeling. Appt made tomorrow.

## 2020-04-01 NOTE — TELEPHONE ENCOUNTER
----- Message from Joanna Carlos sent at 4/1/2020  4:42 PM CDT -----  Contact: patient  Type: Needs Medical Advice    Who Called:  patient  Best Call Back Number:    Additional Information: Patient following up with previous message-thank you

## 2020-04-01 NOTE — TELEPHONE ENCOUNTER
----- Message from Tim Mckeon sent at 4/1/2020  4:08 PM CDT -----  Type: Needs Medical Advice    Who Called:  Patient  Symptoms (please be specific): Dry mouth-   How long has patient had these symptoms:  3 weeks  Pharmacy name and phone #:   [id number] - [place] - [address]  [place] [address]  Phone: [phone] Fax: [phone]      Best Call Back Number: [phone]  Additional Information: Please call to advise

## 2020-04-02 ENCOUNTER — LAB VISIT (OUTPATIENT)
Dept: LAB | Facility: HOSPITAL | Age: 75
End: 2020-04-02
Attending: FAMILY MEDICINE
Payer: MEDICARE

## 2020-04-02 ENCOUNTER — OFFICE VISIT (OUTPATIENT)
Dept: FAMILY MEDICINE | Facility: CLINIC | Age: 75
End: 2020-04-02
Attending: FAMILY MEDICINE
Payer: MEDICARE

## 2020-04-02 VITALS
OXYGEN SATURATION: 99 % | BODY MASS INDEX: 27.88 KG/M2 | DIASTOLIC BLOOD PRESSURE: 68 MMHG | SYSTOLIC BLOOD PRESSURE: 144 MMHG | HEART RATE: 96 BPM | WEIGHT: 167.31 LBS | HEIGHT: 65 IN | TEMPERATURE: 98 F

## 2020-04-02 DIAGNOSIS — E78.5 HYPERLIPIDEMIA ASSOCIATED WITH TYPE 2 DIABETES MELLITUS: ICD-10-CM

## 2020-04-02 DIAGNOSIS — E11.59 HYPERTENSION ASSOCIATED WITH DIABETES: ICD-10-CM

## 2020-04-02 DIAGNOSIS — I15.2 HYPERTENSION ASSOCIATED WITH DIABETES: ICD-10-CM

## 2020-04-02 DIAGNOSIS — E11.9 NEW ONSET TYPE 2 DIABETES MELLITUS: ICD-10-CM

## 2020-04-02 DIAGNOSIS — M35.00 SJOGREN'S SYNDROME, WITH UNSPECIFIED ORGAN INVOLVEMENT: ICD-10-CM

## 2020-04-02 DIAGNOSIS — M79.10 MYALGIA: ICD-10-CM

## 2020-04-02 DIAGNOSIS — E11.69 HYPERLIPIDEMIA ASSOCIATED WITH TYPE 2 DIABETES MELLITUS: ICD-10-CM

## 2020-04-02 DIAGNOSIS — M35.00 SJOGREN'S SYNDROME, WITH UNSPECIFIED ORGAN INVOLVEMENT: Primary | ICD-10-CM

## 2020-04-02 DIAGNOSIS — I10 HYPERTENSION, ESSENTIAL: ICD-10-CM

## 2020-04-02 PROCEDURE — 99214 OFFICE O/P EST MOD 30 MIN: CPT | Mod: S$GLB,,, | Performed by: FAMILY MEDICINE

## 2020-04-02 PROCEDURE — 86235 NUCLEAR ANTIGEN ANTIBODY: CPT

## 2020-04-02 PROCEDURE — 3078F DIAST BP <80 MM HG: CPT | Mod: CPTII,S$GLB,, | Performed by: FAMILY MEDICINE

## 2020-04-02 PROCEDURE — 99999 PR PBB SHADOW E&M-EST. PATIENT-LVL IV: ICD-10-PCS | Mod: PBBFAC,,, | Performed by: FAMILY MEDICINE

## 2020-04-02 PROCEDURE — 3078F PR MOST RECENT DIASTOLIC BLOOD PRESSURE < 80 MM HG: ICD-10-PCS | Mod: CPTII,S$GLB,, | Performed by: FAMILY MEDICINE

## 2020-04-02 PROCEDURE — 99214 PR OFFICE/OUTPT VISIT, EST, LEVL IV, 30-39 MIN: ICD-10-PCS | Mod: S$GLB,,, | Performed by: FAMILY MEDICINE

## 2020-04-02 PROCEDURE — 99499 RISK ADDL DX/OHS AUDIT: ICD-10-PCS | Mod: S$GLB,,, | Performed by: FAMILY MEDICINE

## 2020-04-02 PROCEDURE — 36415 COLL VENOUS BLD VENIPUNCTURE: CPT | Mod: PO

## 2020-04-02 PROCEDURE — 3077F SYST BP >= 140 MM HG: CPT | Mod: CPTII,S$GLB,, | Performed by: FAMILY MEDICINE

## 2020-04-02 PROCEDURE — 86235 NUCLEAR ANTIGEN ANTIBODY: CPT | Mod: 59

## 2020-04-02 PROCEDURE — 1159F MED LIST DOCD IN RCRD: CPT | Mod: S$GLB,,, | Performed by: FAMILY MEDICINE

## 2020-04-02 PROCEDURE — 1101F PT FALLS ASSESS-DOCD LE1/YR: CPT | Mod: CPTII,S$GLB,, | Performed by: FAMILY MEDICINE

## 2020-04-02 PROCEDURE — 99499 UNLISTED E&M SERVICE: CPT | Mod: S$GLB,,, | Performed by: FAMILY MEDICINE

## 2020-04-02 PROCEDURE — 1159F PR MEDICATION LIST DOCUMENTED IN MEDICAL RECORD: ICD-10-PCS | Mod: S$GLB,,, | Performed by: FAMILY MEDICINE

## 2020-04-02 PROCEDURE — 1126F PR PAIN SEVERITY QUANTIFIED, NO PAIN PRESENT: ICD-10-PCS | Mod: S$GLB,,, | Performed by: FAMILY MEDICINE

## 2020-04-02 PROCEDURE — 1126F AMNT PAIN NOTED NONE PRSNT: CPT | Mod: S$GLB,,, | Performed by: FAMILY MEDICINE

## 2020-04-02 PROCEDURE — 99999 PR PBB SHADOW E&M-EST. PATIENT-LVL IV: CPT | Mod: PBBFAC,,, | Performed by: FAMILY MEDICINE

## 2020-04-02 PROCEDURE — 3077F PR MOST RECENT SYSTOLIC BLOOD PRESSURE >= 140 MM HG: ICD-10-PCS | Mod: CPTII,S$GLB,, | Performed by: FAMILY MEDICINE

## 2020-04-02 PROCEDURE — 1101F PR PT FALLS ASSESS DOC 0-1 FALLS W/OUT INJ PAST YR: ICD-10-PCS | Mod: CPTII,S$GLB,, | Performed by: FAMILY MEDICINE

## 2020-04-02 RX ORDER — LOSARTAN POTASSIUM 100 MG/1
100 TABLET ORAL DAILY
Qty: 90 TABLET | Refills: 3 | Status: SHIPPED | OUTPATIENT
Start: 2020-04-02 | End: 2021-04-28 | Stop reason: SDUPTHER

## 2020-04-02 NOTE — PROGRESS NOTES
Subjective:       Patient ID: Erica Masterson is a 74 y.o. female.    Chief Complaint: Hypertension (dry mouth, dizziness)    74-year-old female coming in with continuing complaints of dry mouth with some muscle aches and some tremors.  She was seen about a week ago and during evaluation for the same complaints she was found have an A1c of 6.5 with a fasting blood sugar of 95 and a moderately elevated ESR and normal C reactive protein levels.  The patient is also being treated by ophthalmology for nonspecific keratoconjunctivitis with methylcellulose moisturize and drops.  She does have osteoarthritis but no history of any symptoms compatible with rheumatoid.  She has a history of a multinodular goiter and a TSH last week was normal.  She has not tried the Biotene moisturizer for the mouth as of yet.  Her home blood pressure log is somewhat variable getting occasional but rare lows, the last one recorded was back in November and frequently getting systolics into the 160s and 170s currently taking losartan 50 mg once daily.  Review over medications does not reveal anything that would be highly suspicious for causing her dry eyes and dry mouth and there is a concern for possible Sjogren syndrome.    She has noted some tingling in the tips of the toes bilaterally.  She continues to experience muscle pains that she states started when she began using Crestor.    Past Medical History:  No date: Allergy  No date: Cataract  No date: Colon polyp  4/3/2012: Degenerative arthritis of knee  3/26/2012: Depression  No date: Diverticulosis  No date: GERD (gastroesophageal reflux disease)  No date: Hyperlipidemia  No date: Hypertension  3/26/2012: Multinodular goiter  1/18/2010: Myopathy, unspecified  3/24/2020: New onset type 2 diabetes mellitus  3/24/2020: New onset type 2 diabetes mellitus  No date: Tuberculosis    Past Surgical History:  2015: BREAST BIOPSY; Left      Comment:  benign  12/2/15: COLONOSCOPY      Comment:    Tobi, multiple polyps, recheck five years-three                years if more than 2 polyps are adenomatous  12/2/2015: COLONOSCOPY; N/A  3/20/2019: COLONOSCOPY; N/A      Comment:  Procedure: COLONOSCOPY;  Surgeon: Jose Britton MD;                Location: [facility];  Service: Endoscopy;  Laterality:                N/A;  No date: FOOT SURGERY  No date: HYSTERECTOMY  06/06/2013: KNEE SURGERY      Comment:  right knee tear Dr Iverson   1966: LUNG LOBECTOMY      Comment:  right middle lobectomy, due to Tb  No date: OOPHORECTOMY  No date: SHOULDER SURGERY      Comment:  francis     Current Outpatient Medications on File Prior to Visit:  aspirin (ECOTRIN) 81 MG EC tablet, Take 162 mg by mouth once daily. , Disp: , Rfl:   carboxymethylcellulose sodium 0.25 % Drop, Place 1 drop into both eyes once daily., Disp: , Rfl:   erythromycin (ROMYCIN) ophthalmic ointment, Place into both eyes 2 (two) times daily., Disp: 3.5 g, Rfl: 0  metFORMIN (GLUCOPHAGE-XR) 500 MG XR 24hr tablet, Take one every morning for one week then increase to one twice a day thereafter, Disp: 60 tablet, Rfl: 5  rosuvastatin (CRESTOR) 20 MG tablet, Take 1 tablet (20 mg total) by mouth every evening., Disp: 90 tablet, Rfl: 3  salsalate (DISALCID) 500 MG Tab, Take 1 tablet (500 mg total) by mouth 2 (two) times daily., Disp: 60 tablet, Rfl: 0  (DISCONTINUED) losartan (COZAAR) 50 MG tablet, Take 1 tablet (50 mg total) by mouth once daily., Disp: 90 tablet, Rfl: 3    No current facility-administered medications on file prior to visit.         Review of Systems   Constitutional: Positive for unexpected weight change (Weight has dropped about 2-1/2 lb since her last visit contributing factors include a better diet since finding she was diabetic and the use of the metformin). Negative for activity change, appetite change, chills, fatigue and fever.   HENT:        Dry eyes and dry mouth   Respiratory: Negative for chest tightness and shortness of breath.     Cardiovascular: Negative for chest pain and palpitations.   Endocrine: Negative for polydipsia and polyuria.   Musculoskeletal: Positive for arthralgias and myalgias. Negative for joint swelling.   Neurological: Positive for tremors and numbness. Negative for dizziness, seizures, syncope, weakness, light-headedness and headaches.       Objective:      Physical Exam   Constitutional: She is oriented to person, place, and time. She appears well-developed and well-nourished. No distress.   Mildly elevated systolic blood pressure     Cardiovascular: Normal rate, regular rhythm, normal heart sounds and intact distal pulses.   Pulses:       Dorsalis pedis pulses are 2+ on the right side, and 2+ on the left side.        Posterior tibial pulses are 2+ on the right side, and 2+ on the left side.   Pulmonary/Chest: Effort normal and breath sounds normal.   Musculoskeletal:        Right foot: There is deformity (Slight displacement of 2nd toe but not as severe is on the left foot.  Suspect congenital deformity rather than bunion). There is normal range of motion.        Left foot: There is deformity (Sec toe somewhat overriding great toe without significant angulation of great toe). There is normal range of motion.   Feet:   Right Foot:   Protective Sensation: 8 sites tested. 8 sites sensed.   Skin Integrity: Negative for ulcer, blister, skin breakdown, erythema, warmth, callus or dry skin.   Left Foot:   Protective Sensation: 8 sites tested. 8 sites sensed.   Skin Integrity: Negative for ulcer, blister, skin breakdown, erythema, warmth, callus or dry skin.   Neurological: She is alert and oriented to person, place, and time.   Proprioception intact all 10 toes   Skin: Capillary refill takes less than 2 seconds. She is not diaphoretic.   Psychiatric: She has a normal mood and affect. Her behavior is normal. Judgment and thought content normal.   Nursing note and vitals reviewed.      Assessment:       1. Sjogren's syndrome,  with unspecified organ involvement    2. Hypertension, essential    3. New onset type 2 diabetes mellitus    4. Hypertension associated with diabetes    5. Hyperlipidemia associated with type 2 diabetes mellitus    6. Myalgia        Plan:       1. Sjogren's syndrome, with unspecified organ involvement  Check for Sjogren's antibodies.  If those are negative consider ENT consult and possible salivary gland biopsy.  In the meanwhile she is instructed to use Biotene for symptom relief  - ANTI -SSA ANTIBODY; Future  - ANTI-SSB ANTIBODY; Future    2. Hypertension, essential  Poor control overall.  Increase losartan to 100 mg.  She will take two 50s at a time to use them up and should have about a two week supply.  She was given a written prescription for the 100 mg  - losartan (COZAAR) 100 MG tablet; Take 1 tablet (100 mg total) by mouth once daily.  Dispense: 90 tablet; Refill: 3    3. New onset type 2 diabetes mellitus  Has upcoming appointment for dietitian/diabetic educator.  Tolerating the metformin well.  Blood sugar this morning was 85.  She has follow-up A1c and lipid panel scheduled in June 4. Hypertension associated with diabetes  See above    5. Hyperlipidemia associated with type 2 diabetes mellitus  Patient having problems with myalgias that may be statin related.  She was instructed to hold the Crestor for two weeks and see if her symptoms resolve, if so she is to resume it and see if they recur.    6. Myalgia  See above

## 2020-04-03 ENCOUNTER — TELEPHONE (OUTPATIENT)
Dept: FAMILY MEDICINE | Facility: CLINIC | Age: 75
End: 2020-04-03

## 2020-04-03 DIAGNOSIS — R68.2 DRY MOUTH: Primary | ICD-10-CM

## 2020-04-03 LAB
ANTI-SSA ANTIBODY: 0.07 RATIO (ref 0–0.99)
ANTI-SSA INTERPRETATION: NEGATIVE
ANTI-SSB ANTIBODY: 0.06 RATIO (ref 0–0.99)
ANTI-SSB INTERPRETATION: NEGATIVE

## 2020-04-03 NOTE — TELEPHONE ENCOUNTER
----- Message from Narayan Myers MD sent at 4/3/2020 12:00 PM CDT -----  The antibody tests for Sjogren syndrome are negative.  If she wants to pursue this further we can send her to ENT which may result in biopsy of salivary glands.

## 2020-04-07 ENCOUNTER — TELEPHONE (OUTPATIENT)
Dept: FAMILY MEDICINE | Facility: CLINIC | Age: 75
End: 2020-04-07

## 2020-04-07 NOTE — TELEPHONE ENCOUNTER
Onset at 12:00 am today started with sore throat, eyes watery, mucus in throat, afebrile, no body aches, no sob, started self on Allegra 24 hr. Patient advised to continue Allegra. Watch for worsening of symptoms and call prn.

## 2020-04-07 NOTE — TELEPHONE ENCOUNTER
----- Message from Forrest Rucker sent at 4/7/2020  6:59 AM CDT -----  Contact: [facility]  [phone] or [phone] (C)  Type: Needs Medical Advice    Who Called:  Ptnt  [phone] or [phone] (C)    Symptoms (please be specific):  Sore throat, watery eyes coughing up mucus; symptoms worsen at night.    How long has patient had these symptoms: This week.     Additional Information: Advised needs to speak with the nurse about her condition. Please call would like to speak with Shefali.

## 2020-04-23 ENCOUNTER — TELEPHONE (OUTPATIENT)
Dept: FAMILY MEDICINE | Facility: CLINIC | Age: 75
End: 2020-04-23

## 2020-04-24 ENCOUNTER — HOSPITAL ENCOUNTER (OUTPATIENT)
Dept: RADIOLOGY | Facility: CLINIC | Age: 75
Discharge: HOME | End: 2020-04-24
Attending: NURSE PRACTITIONER
Payer: MEDICARE

## 2020-04-24 ENCOUNTER — OFFICE VISIT (OUTPATIENT)
Dept: FAMILY MEDICINE | Facility: CLINIC | Age: 75
End: 2020-04-24
Payer: MEDICARE

## 2020-04-24 ENCOUNTER — TELEPHONE (OUTPATIENT)
Dept: FAMILY MEDICINE | Facility: CLINIC | Age: 75
End: 2020-04-24

## 2020-04-24 DIAGNOSIS — K11.7 XEROSTOMIA: ICD-10-CM

## 2020-04-24 DIAGNOSIS — M54.2 CERVICAL PAIN (NECK): ICD-10-CM

## 2020-04-24 DIAGNOSIS — R20.2 PARESTHESIA OF BOTH HANDS: ICD-10-CM

## 2020-04-24 DIAGNOSIS — M54.2 CERVICAL PAIN (NECK): Primary | ICD-10-CM

## 2020-04-24 PROCEDURE — 99214 PR OFFICE/OUTPT VISIT, EST, LEVL IV, 30-39 MIN: ICD-10-PCS | Mod: 95,,, | Performed by: NURSE PRACTITIONER

## 2020-04-24 PROCEDURE — 99214 OFFICE O/P EST MOD 30 MIN: CPT | Mod: 95,,, | Performed by: NURSE PRACTITIONER

## 2020-04-24 PROCEDURE — 1159F MED LIST DOCD IN RCRD: CPT | Mod: 95,,, | Performed by: NURSE PRACTITIONER

## 2020-04-24 PROCEDURE — 72040 X-RAY EXAM NECK SPINE 2-3 VW: CPT | Mod: 26,,, | Performed by: RADIOLOGY

## 2020-04-24 PROCEDURE — 1101F PT FALLS ASSESS-DOCD LE1/YR: CPT | Mod: CPTII,95,, | Performed by: NURSE PRACTITIONER

## 2020-04-24 PROCEDURE — 72040 X-RAY EXAM NECK SPINE 2-3 VW: CPT | Mod: TC,FY,PO

## 2020-04-24 PROCEDURE — 72040 XR CERVICAL SPINE AP LATERAL: ICD-10-PCS | Mod: 26,,, | Performed by: RADIOLOGY

## 2020-04-24 PROCEDURE — 1159F PR MEDICATION LIST DOCUMENTED IN MEDICAL RECORD: ICD-10-PCS | Mod: 95,,, | Performed by: NURSE PRACTITIONER

## 2020-04-24 PROCEDURE — 1101F PR PT FALLS ASSESS DOC 0-1 FALLS W/OUT INJ PAST YR: ICD-10-PCS | Mod: CPTII,95,, | Performed by: NURSE PRACTITIONER

## 2020-04-24 NOTE — TELEPHONE ENCOUNTER
----- Message from Janina Luz sent at 4/24/2020  9:13 AM CDT -----  Contact: pt  Pt state that she is having dry mouth also she has tingling in both feet every morning when she wakes up and doing the day. Pt is going to the mobile store to get them help her down load the [employer] leonel. Pt states that she can be reached at [phone]

## 2020-04-24 NOTE — PROGRESS NOTES
Subjective:    The patient location is: [place], [place]  The chief complaint leading to consultation is: ight hand tingling, neck pain, and dry mouth.  Visit type: audiovisual  Total time spent with patient: 31 minutes  Each patient to whom he or she provides medical services by telemedicine is:  (1) informed of the relationship between the physician and patient and the respective role of any other health care provider with respect to management of the patient; and (2) notified that he or she may decline to receive medical services by telemedicine and may withdraw from such care at any time.     Patient ID: Erica Masterson is a 74 y.o. female presents for televisit for right hand tingling, neck pain, and dry mouth. This patient is new to me. She has a history of Sjogren's syndrome. She has been experiencing the dry mouth for over two months. She is currently using Biotene dry mouth oral rinse that she reports is effective. She drinks water and caffeinated drinks. She wanted to know if she should do more for her dry mouth.     Chief Complaint: Hand Pain (bilateral) and Neck Pain    Pain   This is a new problem. The current episode started more than 1 month ago. The problem occurs daily. The problem has been waxing and waning. Associated symptoms include arthralgias and neck pain. Pertinent negatives include no abdominal pain, anorexia, change in bowel habit, chest pain, chills, congestion, coughing, diaphoresis, fatigue, fever, headaches, joint swelling, myalgias, nausea, numbness, rash, sore throat, swollen glands, urinary symptoms, vertigo, visual change, vomiting or weakness. Associated symptoms comments: Hand tingling. The symptoms are aggravated by twisting. She has tried acetaminophen for the symptoms. The treatment provided significant relief.     Past Medical History:   Diagnosis Date    Allergy     Cataract     Colon polyp     Degenerative arthritis of knee 4/3/2012    Depression 3/26/2012    Diverticulosis      GERD (gastroesophageal reflux disease)     Hyperlipidemia     Hypertension     Multinodular goiter 3/26/2012    Myopathy, unspecified 1/18/2010    New onset type 2 diabetes mellitus 3/24/2020    New onset type 2 diabetes mellitus 3/24/2020    Tuberculosis      Past Surgical History:   Procedure Laterality Date    BREAST BIOPSY Left 2015    benign    COLONOSCOPY  12/2/15    Dr. Britton, multiple polyps, recheck five years-three years if more than 2 polyps are adenomatous    COLONOSCOPY N/A 12/2/2015    COLONOSCOPY N/A 3/20/2019    Procedure: COLONOSCOPY;  Surgeon: Jose Britton MD;  Location: [facility];  Service: Endoscopy;  Laterality: N/A;    FOOT SURGERY      HYSTERECTOMY      KNEE SURGERY  06/06/2013    right knee tear Dr Iverson     LUNG LOBECTOMY  1966    right middle lobectomy, due to Tb    OOPHORECTOMY      SHOULDER SURGERY      francis      Social History     Socioeconomic History    Marital status:      Spouse name: Not on file    Number of children: Not on file    Years of education: Not on file    Highest education level: Not on file   Occupational History    Not on file   Social Needs    Financial resource strain: Not on file    Food insecurity:     Worry: Not on file     Inability: Not on file    Transportation needs:     Medical: Not on file     Non-medical: Not on file   Tobacco Use    Smoking status: Former Smoker    Smokeless tobacco: Never Used    Tobacco comment: quit in 1963   Substance and Sexual Activity    Alcohol use: Yes     Comment: Occasionally    Drug use: No    Sexual activity: Not Currently   Lifestyle    Physical activity:     Days per week: Not on file     Minutes per session: Not on file    Stress: Not on file   Relationships    Social connections:     Talks on phone: Not on file     Gets together: Not on file     Attends Hinduism service: Not on file     Active member of club or organization: Not on file     Attends meetings of clubs or  organizations: Not on file     Relationship status: Not on file   Other Topics Concern    Are you pregnant or think you may be? Not Asked    Breast-feeding Not Asked   Social History Narrative    Not on file       Review of patient's allergies indicates:   Allergen Reactions    No known drug allergies        Current Outpatient Medications:     aspirin (ECOTRIN) 81 MG EC tablet, Take 162 mg by mouth once daily. , Disp: , Rfl:     carboxymethylcellulose sodium 0.25 % Drop, Place 1 drop into both eyes once daily., Disp: , Rfl:     erythromycin (ROMYCIN) ophthalmic ointment, Place into both eyes 2 (two) times daily., Disp: 3.5 g, Rfl: 0    losartan (COZAAR) 100 MG tablet, Take 1 tablet (100 mg total) by mouth once daily., Disp: 90 tablet, Rfl: 3    metFORMIN (GLUCOPHAGE-XR) 500 MG XR 24hr tablet, Take one every morning for one week then increase to one twice a day thereafter, Disp: 60 tablet, Rfl: 5    rosuvastatin (CRESTOR) 20 MG tablet, Take 1 tablet (20 mg total) by mouth every evening., Disp: 90 tablet, Rfl: 3    Review of Systems   Constitutional: Negative for activity change, appetite change, chills, diaphoresis, fatigue and fever.   HENT: Negative for congestion, ear discharge, ear pain, facial swelling, hearing loss, postnasal drip, rhinorrhea, sinus pressure, sore throat, trouble swallowing and voice change.    Eyes: Negative for pain, discharge and itching.   Respiratory: Negative for cough, chest tightness, shortness of breath and wheezing.    Cardiovascular: Negative for chest pain, palpitations and leg swelling.   Gastrointestinal: Negative for abdominal distention, abdominal pain, anorexia, change in bowel habit, constipation, diarrhea, nausea and vomiting.   Endocrine: Negative for polydipsia, polyphagia and polyuria.   Genitourinary: Negative for difficulty urinating, flank pain and urgency.   Musculoskeletal: Positive for arthralgias, neck pain and neck stiffness. Negative for back pain, gait  problem, joint swelling and myalgias.        Posterior cervical neck pain.   Skin: Negative for color change, pallor, rash and wound.   Neurological: Negative for dizziness, vertigo, syncope, facial asymmetry, weakness, numbness and headaches.   Hematological: Negative for adenopathy.   Psychiatric/Behavioral: Negative for agitation, behavioral problems and confusion.       Objective:     Vitals: Not taken per patient  Physical Exam   Constitutional: She is oriented to person, place, and time. She appears well-nourished. No distress.   HENT:   Head: Normocephalic and atraumatic.   Mouth/Throat: Oropharynx is clear and moist. No oropharyngeal exudate.   No tenderness to palpation of the frontal sinuses on patient self-exam  No tenderness to palpation of the maxillary sinuses on patient self-exam   Eyes: EOM are normal.   Neck:   No cervical adenopathy palpated on patient self-exam.    Cardiovascular:   no shelbie oral cyanosis  Capillary refill less than 5 sec on patient self-exam   Pulmonary/Chest: Effort normal.   No retractions, increased work of breathing, and audible wheezes   Abdominal:   No abdominal TTP on patient self-exam,   No CVA tenderness as palpated by patient   Neurological: She is alert and oriented to person, place, and time.   Skin:   No observed rashes/bruises.   Psychiatric: She has a normal mood and affect. Her behavior is normal.       Assessment:       1. Cervical pain (neck)    2. Xerostomia    3. Paresthesia of both hands        Plan:     Erica was seen today for hand pain. She has a history of Sjogren's syndrome but I want to rule out a fracture . The xerostomia and hand tingling is typical of Sjogren's syndrome. If Hand paresthesia continues will try Naproxen or Hydroxychloroquine.   Verbally discussed plan and patient confirms understanding    Diagnoses and all orders for this visit:    Cervical pain (neck)   -     Cancel: X-Ray Neck Soft Tissue; Future  - Xray of the neck taken awaiting  results.   Xerostomia        - Continue use of  Biotene dry mouth oral rinse       - Avoid caffeinate drinks       - Use saliva stimulating lozenges or chewing gum        - Use humidifier to decrease loss of secretions by evaporation  Paresthesia of both hands       - Use Tylenol or Ibuprofen as needed.     If there are any questions or problems with the prescription, call [phone] anytime for assistance.   1. Please schedule a virtual follow up visit first.  2. Please see an in-person provider if your symptoms are worsening or not improving in 2-3 days.-  3. Please print a copy of this note and send it to your regular doctor or take it to your next visit so it may be included in your medical record.   4. Contact customer support at [phone] for questions or concerns   5. You must understand that you've received a Telehealth Urgent Care treatment only and that you may be released before all your medical problems are known or treated. You, the patient, will arrange for follow up care as instructed.  6. Follow up with your PCP or specialty clinic as directed in the next 1-2 days if not improved or as needed.  You can call [phone] to schedule an appointment with the appropriate provider with [facility]   If your condition worsens we recommend that you receive another evaluation at an Urgent care, in the emergency room immediately or contact your primary medical clinics after hours call service to discuss your concerns.  Follow up if symptoms worsen or fail to improve.

## 2020-04-24 NOTE — TELEPHONE ENCOUNTER
Set up patients [facility] portal and helped her download the [employer] leonel. gabe given instructions on what to do between now and her appointment at 2 pm with Ms. Aguila.

## 2020-04-27 ENCOUNTER — CLINICAL SUPPORT (OUTPATIENT)
Dept: DIABETES | Facility: CLINIC | Age: 75
End: 2020-04-27
Attending: FAMILY MEDICINE
Payer: MEDICARE

## 2020-04-27 VITALS — WEIGHT: 167.31 LBS | HEIGHT: 65 IN | BODY MASS INDEX: 27.88 KG/M2

## 2020-04-27 DIAGNOSIS — E11.9 NEW ONSET TYPE 2 DIABETES MELLITUS: ICD-10-CM

## 2020-04-27 PROCEDURE — G0108 DIAB MANAGE TRN  PER INDIV: HCPCS | Mod: S$GLB,,, | Performed by: NUTRITIONIST

## 2020-04-27 PROCEDURE — 99999 PR PBB SHADOW E&M-EST. PATIENT-LVL III: CPT | Mod: PBBFAC,,, | Performed by: NUTRITIONIST

## 2020-04-27 PROCEDURE — 99999 PR PBB SHADOW E&M-EST. PATIENT-LVL III: ICD-10-PCS | Mod: PBBFAC,,, | Performed by: NUTRITIONIST

## 2020-04-27 PROCEDURE — G0108 PR DIAB MANAGE TRN  PER INDIV: ICD-10-PCS | Mod: S$GLB,,, | Performed by: NUTRITIONIST

## 2020-04-27 NOTE — PROGRESS NOTES
Diabetes Education--Telemedicine (phone) Visit  Author: Rubia Leal RD  Date: 4/27/2020    Diabetes Care Management Summary  Diabetes Education Record Assessment/Progress: Initial  Current Diabetes Risk Level: Low     Diabetes Type  Diabetes Type : Type II    Diabetes History  Diabetes Diagnosis: 0-1 year  Current Treatment: Oral Medication(Metformin 500 mg BID w/food)  Reviewed Problem List with Patient: Yes    Health Maintenance was reviewed today with patient. Discussed with patient importance of routine eye exams, foot exams/foot care, blood work (i.e.: A1c, microalbumin, and lipid), dental visits, yearly flu vaccine, and pneumonia vaccine as indicated by PCP. Patient verbalized understanding.     Health Maintenance Topics with due status: Not Due       Topic Last Completion Date    TETANUS VACCINE 11/02/2011    Colonoscopy 03/20/2019    Lipid Panel 08/30/2019    Eye Exam 12/10/2019    Hemoglobin A1c 03/23/2020    Foot Exam 04/02/2020     Health Maintenance Due   Topic Date Due    DEXA SCAN  03/29/2020       Nutrition  Meal Planning: drinks regular soda, 3 meals per day, snacks between meal(pt tries to be careful w/food choices;  is non-compliant DM (food))  What type of sweetener do you use?: honey  What type of beverages do you drink?: regular soda/tea, milk(sips on a 7 oz Coke all day; [place] milk with cereal; no juice)  Meal Plan 24 Hour Recall - Breakfast: (Cheerios (34 gm carbs 1 cup) or Raisin Bran with [place] (14 gm carb 1 cup); coffee or tea w/honey)  Meal Plan 24 Hour Recall - Lunch: (beans and rice; broccoli, fruit; sips of Coke)  Meal Plan 24 Hour Recall - Dinner: (pot roast, potatoes, carrots, celery; sips of Coke)  Meal Plan 24 Hour Recall - Snack: (during day--fruit, cookie w/cream filling or ice cream.  HS--BBQ drumstick (no skim), OR cookie OR ice cream)    Monitoring   Monitoring: Other  Self Monitoring : (yes  BS=)  Blood Glucose Logs: Yes  Do you use a personal  continuous glucose monitor?: No  In the last month, how often have you had a low blood sugar reaction?: once  What are your symptoms of low blood sugar?: (awakened in middle night feeling weak and shakey)  How do you treat low blood sugar?: (drank some water)  Can you tell when your blood sugar is too high?: no    Exercise   Exercise Type: bike riding  Frequency: Daily  Duration: 30 min(20-30 mins daily)    Current Diabetes Treatment   Current Treatment: Oral Medication(Metformin 500 mg BID w/food)    Social History  Preferred Learning Method: Face to Face  Primary Support: Self, Spouse  Educational Level: High School  Smoking Status: Ex Smoker  Alcohol Use: Never    Barriers to Change  Barriers to Change: None  Learning Challenges : None    Readiness to Learn   Readiness to Learn : Eager    Cultural Influences  Cultural Influences: None    Diabetes Education Assessment/Progress  Diabetes Disease Process (diabetes disease process and treatment options): Discussion, Individual Session, Comprehends Key Points, Written Materials Provided(materials mailed to pt)  Nutrition (Incorporating nutritional management into one's lifestyle): Discussion, Demonstrates Understanding/Competency (verbalizes/demonstrates), Individual Session, Comprehends Key Points, Written Materials Provided(materials mailed to pt)  Physical Activity (incorporating physical activity into one's lifestyle): Discussion, Demonstrates Understanding/Competency (verbalizes/demonstrates), Individual Session, Comprehends Key Points  Medications (states correct name, dose, onset, peak, duration, side effects & timing of meds): Discussion, Demonstrates Understanding/Competency(verbalizes/demonstrates), Individual Session, Comprehends Key Points  Monitoring (monitoring blood glucose/other parameters & using results): Discussion, Demonstrates Understanding/Competency (verbalizes/demonstrates), Individual Session, Comprehends Key Points  Acute Complications  (preventing, detecting, and treating acute complications): Discussion, Demonstrates Understanding/Competency (verbalizes/demonstrates), Individual Session, Comprehends Key Points  Chronic Complications (preventing, detecting, and treating chronic complications): Discussion, Demonstrates Understanding/Competency (verbalizes/demonstrates), Individual Session, Comprehends Key Points  Clinical (diabetes, other pertinent medical history, and relevant comorbidities reviewed during visit): Discussion, Demonstrates Understanding/Competency (verbalizes/demonstrates), Individual Session, Comprehends Key Points  Cognitive (knowledge of self-management skills, functional health literacy): Discussion, Demonstrates Understanding/Competency (verbalizes/demonstrates), Individual Session, Comprehends Key Points  Psychosocial (emotional response to diabetes): Discussion, Comprehends Key Points  Diabetes Distress and Support Systems: Discussion, Demonstrates Understanding/Competency (verbalizes/demonstrates), Individual Session, Comprehends Key Points  Behavioral (readiness for change, lifestyle practices, self-care behaviors): Discussion, Individual Session, Comprehends Key Points    Goals  Patient has selected/evaluated goals during today's session: Yes, selected  Healthy Eating: Set(read labels (check carb amount for cereals/Raisin Bran; substitute water for sipping regular Coke all day long; choose healthier HS snack that combines small amt carbs w/small amt PRO; use less honey or find substitute)  Start Date: 04/27/20  Target Date: 07/27/20  Physical Activity: In Progress(biking twice daily for total of 20-30 mins)  Monitoring: In Progress  Medications: In Progress  Problem Solving: In Progress  Healthy Coping: In Progress  Reducing Risks: In Progress(reviewed info concerning low BS; encouraged healthier HS snack)    Diabetes Care Plan/Intervention  Education Plan/Intervention: Individual Follow-Up DSMT    Diabetes Meal  Plan  Restrictions: Low Fat, Restricted Carbohydrate  Calories: 1400  Carbohydrate Per Meal: 20-30g(30 g, carbs per meal)  Carbohydrate Per Snack : 7-15g  Fat: (reviewed heart healthy fats w/emphasis on moderation)  Protein: (lean PRO at each feeding)    Today's Self-Management Care Plan was developed with the patient's input and is based on barriers identified during today's assessment.    The long and short-term goals in the care plan were written with the patient/caregiver's input. The patient has agreed to work toward these goals to improve her overall diabetes control.      The patient received a copy of today's self-management plan and verbalized understanding of the care plan, goals, and all of today's instructions.      The patient was encouraged to communicate with her physician and care team regarding her condition(s) and treatment.  I provided the patient with my contact information today and encouraged her to contact me via phone or patient portal as needed.       ..Diabetes Care Specialist Telehealth Visit Note     It was in the patient's best interest to receive diabetes self-management education and support services in this format to prevent the exposure to COVID-19.        Established Patient - Audio Only Telehealth Visit  The patient location is: home   The chief complaint leading to consultation is: Diabetes    Visit type: Virtual visit with audio only (telephone)  Total time spent with patient: 60 min      The reason for the audio only service rather than synchronous audio and video virtual visit was related to technical difficulties or patient preference/necessity.     Each patient to whom I provide medical services by telemedicine is:  (1) informed of the relationship between the physician and patient and the respective role of any other health care provider with respect to management of the patient; and (2) notified that they may decline to receive medical services by telemedicine and may  withdraw from such care at any time. Patient verbally consented to receive this service via voice-only telephone call.              Education Units of Time   Time Spent: 60 min

## 2020-04-28 ENCOUNTER — HOSPITAL ENCOUNTER (EMERGENCY)
Facility: HOSPITAL | Age: 75
Discharge: HOME | End: 2020-04-28
Attending: EMERGENCY MEDICINE
Payer: MEDICARE

## 2020-04-28 ENCOUNTER — TELEPHONE (OUTPATIENT)
Dept: FAMILY MEDICINE | Facility: CLINIC | Age: 75
End: 2020-04-28

## 2020-04-28 VITALS
WEIGHT: 167.56 LBS | DIASTOLIC BLOOD PRESSURE: 75 MMHG | HEIGHT: 66 IN | SYSTOLIC BLOOD PRESSURE: 138 MMHG | HEART RATE: 75 BPM | OXYGEN SATURATION: 94 % | BODY MASS INDEX: 26.93 KG/M2 | RESPIRATION RATE: 16 BRPM | TEMPERATURE: 99 F

## 2020-04-28 DIAGNOSIS — E11.9 NEW ONSET TYPE 2 DIABETES MELLITUS: ICD-10-CM

## 2020-04-28 DIAGNOSIS — E16.2 HYPOGLYCEMIA: Primary | ICD-10-CM

## 2020-04-28 LAB
ALBUMIN SERPL BCP-MCNC: 4.1 G/DL (ref 3.5–5.2)
ALP SERPL-CCNC: 57 U/L (ref 55–135)
ALT SERPL W/O P-5'-P-CCNC: 20 U/L (ref 10–44)
ANION GAP SERPL CALC-SCNC: 10 MMOL/L (ref 8–16)
AST SERPL-CCNC: 16 U/L (ref 10–40)
BASOPHILS # BLD AUTO: 0.03 K/UL (ref 0–0.2)
BASOPHILS NFR BLD: 0.5 % (ref 0–1.9)
BILIRUB SERPL-MCNC: 0.3 MG/DL (ref 0.1–1)
BUN SERPL-MCNC: 17 MG/DL (ref 8–23)
CALCIUM SERPL-MCNC: 9.9 MG/DL (ref 8.7–10.5)
CHLORIDE SERPL-SCNC: 105 MMOL/L (ref 95–110)
CO2 SERPL-SCNC: 26 MMOL/L (ref 23–29)
CREAT SERPL-MCNC: 1.1 MG/DL (ref 0.5–1.4)
DIFFERENTIAL METHOD: NORMAL
EOSINOPHIL # BLD AUTO: 0.2 K/UL (ref 0–0.5)
EOSINOPHIL NFR BLD: 2.7 % (ref 0–8)
ERYTHROCYTE [DISTWIDTH] IN BLOOD BY AUTOMATED COUNT: 12.7 % (ref 11.5–14.5)
EST. GFR  (AFRICAN AMERICAN): 56 ML/MIN/1.73 M^2
EST. GFR  (NON AFRICAN AMERICAN): 49 ML/MIN/1.73 M^2
GLUCOSE SERPL-MCNC: 105 MG/DL (ref 70–110)
HCT VFR BLD AUTO: 38.1 % (ref 37–48.5)
HGB BLD-MCNC: 12.2 G/DL (ref 12–16)
IMM GRANULOCYTES # BLD AUTO: 0 K/UL (ref 0–0.04)
IMM GRANULOCYTES NFR BLD AUTO: 0 % (ref 0–0.5)
LYMPHOCYTES # BLD AUTO: 2 K/UL (ref 1–4.8)
LYMPHOCYTES NFR BLD: 33.3 % (ref 18–48)
MCH RBC QN AUTO: 29.4 PG (ref 27–31)
MCHC RBC AUTO-ENTMCNC: 32 G/DL (ref 32–36)
MCV RBC AUTO: 92 FL (ref 82–98)
MONOCYTES # BLD AUTO: 0.4 K/UL (ref 0.3–1)
MONOCYTES NFR BLD: 7 % (ref 4–15)
NEUTROPHILS # BLD AUTO: 3.4 K/UL (ref 1.8–7.7)
NEUTROPHILS NFR BLD: 56.5 % (ref 38–73)
NRBC BLD-RTO: 0 /100 WBC
PLATELET # BLD AUTO: 279 K/UL (ref 150–350)
PMV BLD AUTO: 9.2 FL (ref 9.2–12.9)
POCT GLUCOSE: 86 MG/DL (ref 70–110)
POTASSIUM SERPL-SCNC: 4 MMOL/L (ref 3.5–5.1)
PROT SERPL-MCNC: 7.7 G/DL (ref 6–8.4)
RBC # BLD AUTO: 4.15 M/UL (ref 4–5.4)
SODIUM SERPL-SCNC: 141 MMOL/L (ref 136–145)
WBC # BLD AUTO: 5.97 K/UL (ref 3.9–12.7)

## 2020-04-28 PROCEDURE — 80053 COMPREHEN METABOLIC PANEL: CPT

## 2020-04-28 PROCEDURE — 99283 EMERGENCY DEPT VISIT LOW MDM: CPT | Mod: 25

## 2020-04-28 PROCEDURE — 36415 COLL VENOUS BLD VENIPUNCTURE: CPT

## 2020-04-28 PROCEDURE — 36000 PLACE NEEDLE IN VEIN: CPT

## 2020-04-28 PROCEDURE — 82962 GLUCOSE BLOOD TEST: CPT

## 2020-04-28 PROCEDURE — 85025 COMPLETE CBC W/AUTO DIFF WBC: CPT

## 2020-04-28 RX ORDER — METFORMIN HYDROCHLORIDE 500 MG/1
500 TABLET, EXTENDED RELEASE ORAL DAILY
Qty: 30 TABLET | Refills: 5
Start: 2020-04-28 | End: 2020-05-15 | Stop reason: SINTOL

## 2020-04-28 NOTE — TELEPHONE ENCOUNTER
Patient reporting glucose dropped to 28 at 12:30 am today-it woke her up with shaking and feeling bad and sweaty- ate honey and peanut butter glucose went to 114- recently started Metformin-was up to two daily. She has not taken it yet today.

## 2020-04-28 NOTE — TELEPHONE ENCOUNTER
Patient will reduce Metformin to one daily. She did eat and rode bike less than one block last night.

## 2020-04-28 NOTE — TELEPHONE ENCOUNTER
Reduce the metformin back to a single pill once a day in the morning.  Had she eaten light or skipped dinner?  Was she more physically active late yesterday before going to bed?

## 2020-04-28 NOTE — TELEPHONE ENCOUNTER
----- Message from Ethan JACKSON Frisard sent at 4/28/2020  8:05 AM CDT -----  Contact: same  Patient called in and stated she is having issues regulating sugar & still having dry mouth.  Patient stated her sugar was low even after taking her meds.      Patient does have an appointment on Thursday 4/30/2020 at 2:40pm but would like a call back at [phone].    Monday 4/27/2020 1:30PM--101  Tuesday 4/28/2020 12:20AM--28 #patient took some honey with orange juice# Took sugar levels again and it was 12:35AM it was 114#  Tuesday 4/28/2020 at 3AM --102  Tuesday 4/28/2020 at 8AM-113

## 2020-04-29 ENCOUNTER — HOSPITAL ENCOUNTER (EMERGENCY)
Facility: HOSPITAL | Age: 75
Discharge: HOME | End: 2020-04-29
Attending: EMERGENCY MEDICINE
Payer: MEDICARE

## 2020-04-29 ENCOUNTER — DOCUMENTATION ONLY (OUTPATIENT)
Dept: FAMILY MEDICINE | Facility: CLINIC | Age: 75
End: 2020-04-29

## 2020-04-29 VITALS
BODY MASS INDEX: 27.82 KG/M2 | HEART RATE: 71 BPM | HEIGHT: 65 IN | DIASTOLIC BLOOD PRESSURE: 65 MMHG | TEMPERATURE: 98 F | OXYGEN SATURATION: 98 % | RESPIRATION RATE: 17 BRPM | WEIGHT: 167 LBS | SYSTOLIC BLOOD PRESSURE: 146 MMHG

## 2020-04-29 DIAGNOSIS — R42 DIZZINESS: Primary | ICD-10-CM

## 2020-04-29 DIAGNOSIS — F41.9 ANXIETY: ICD-10-CM

## 2020-04-29 LAB — POCT GLUCOSE: 114 MG/DL (ref 70–110)

## 2020-04-29 PROCEDURE — 82962 GLUCOSE BLOOD TEST: CPT

## 2020-04-29 PROCEDURE — 25000003 PHARM REV CODE 250: Performed by: EMERGENCY MEDICINE

## 2020-04-29 PROCEDURE — 99283 EMERGENCY DEPT VISIT LOW MDM: CPT

## 2020-04-29 RX ORDER — ALPRAZOLAM 0.25 MG/1
0.25 TABLET ORAL
Status: COMPLETED | OUTPATIENT
Start: 2020-04-29 | End: 2020-04-29

## 2020-04-29 RX ORDER — ALPRAZOLAM 0.25 MG/1
0.25 TABLET ORAL 3 TIMES DAILY PRN
Qty: 8 TABLET | Refills: 0 | Status: SHIPPED | OUTPATIENT
Start: 2020-04-29 | End: 2020-06-02

## 2020-04-29 RX ADMIN — ALPRAZOLAM 0.25 MG: 0.25 TABLET ORAL at 03:04

## 2020-04-29 NOTE — ED PROVIDER NOTES
Encounter Date: 4/28/2020    SCRIBE #1 NOTE: I, Kaylah Mar, am scribing for, and in the presence of, Kyle Moore MD.       History     Chief Complaint   Patient presents with    Hypertension    Hypoglycemia     Diagnosed with hypoglycemia 3 weeks ago and woke up feeling bad with glucose at 28 at 12:20 this morning. Talked with Dr. Myers's nurse and told not to take metformin mike Masterson is a 74 y.o. female with a PMHx of HTN and DM type 2 who presents to the ED with an onset of intermittent clamminess, nausea, weakness and tremors today. Patient was reportedly started on Metformin 1 month ago and endorses a hypoglycemic episode last night. Patient reports her sugar was 28 and she felt cold, clammy and dizzy. She reports symptoms resolved after eating. Today, patient reports intermittent clamminess, nausea and weakness but reports sugar levels have been okay.  She states she has had these symptoms intermittently since before she was placed on metformin.  She called primary care and they told her to omit her nighttime dose of metformin The patient denies chest pain, HA, SOB, vomiting, diarrhea or any other symptoms at this time. No pertinent PSHx.      The history is provided by the patient.     Review of patient's allergies indicates:   Allergen Reactions    No known drug allergies      Past Medical History:   Diagnosis Date    Allergy     Cataract     Colon polyp     Degenerative arthritis of knee 4/3/2012    Depression 3/26/2012    Diverticulosis     GERD (gastroesophageal reflux disease)     Hyperlipidemia     Hypertension     Multinodular goiter 3/26/2012    Myopathy, unspecified 1/18/2010    New onset type 2 diabetes mellitus 3/24/2020    New onset type 2 diabetes mellitus 3/24/2020    Tuberculosis      Past Surgical History:   Procedure Laterality Date    BREAST BIOPSY Left 2015    benign    COLONOSCOPY  12/2/15    Dr. Britton, multiple polyps, recheck five  years-three years if more than 2 polyps are adenomatous    COLONOSCOPY N/A 12/2/2015    COLONOSCOPY N/A 3/20/2019    Procedure: COLONOSCOPY;  Surgeon: Jose Britton MD;  Location: [facility];  Service: Endoscopy;  Laterality: N/A;    FOOT SURGERY      HYSTERECTOMY      KNEE SURGERY  06/06/2013    right knee tear Dr Iverson     LUNG LOBECTOMY  1966    right middle lobectomy, due to Tb    OOPHORECTOMY      SHOULDER SURGERY      francis      Family History   Problem Relation Age of Onset    Colon cancer Other     Cancer Other     Cancer Mother     Hypertension Mother     Cancer Brother     Hypertension Father     Diabetes Son     Hypertension Son     Alcohol abuse Son     Diabetes Maternal Aunt     Alzheimer's disease Maternal Uncle     Cancer Maternal Grandmother     No Known Problems Daughter     Dementia Sister     Alzheimer's disease Sister     Breast cancer Sister 60    Obesity Paternal Uncle     Melanoma Neg Hx     Psoriasis Neg Hx     Lupus Neg Hx     Eczema Neg Hx     Amblyopia Neg Hx     Blindness Neg Hx     Cataracts Neg Hx     Glaucoma Neg Hx     Macular degeneration Neg Hx     Retinal detachment Neg Hx     Strabismus Neg Hx     Stroke Neg Hx     Thyroid disease Neg Hx      Social History     Tobacco Use    Smoking status: Former Smoker    Smokeless tobacco: Never Used    Tobacco comment: quit in 1963   Substance Use Topics    Alcohol use: Yes     Comment: Occasionally    Drug use: No     Review of Systems   Constitutional: Positive for chills and diaphoresis. Negative for fever.   HENT: Negative for sore throat.    Eyes: Negative for visual disturbance.   Respiratory: Negative for shortness of breath.    Cardiovascular: Negative for chest pain.   Gastrointestinal: Positive for nausea. Negative for diarrhea and vomiting.   Genitourinary: Negative for dysuria and flank pain.   Musculoskeletal: Negative for back pain.   Skin: Negative for rash.   Neurological: Positive  for dizziness, tremors and weakness (generalized). Negative for numbness and headaches.   Hematological: Does not bruise/bleed easily.       Physical Exam     Initial Vitals [04/28/20 1851]   BP Pulse Resp Temp SpO2   (!) 194/84 78 16 99.3 °F (37.4 °C) 99 %      MAP       --         Physical Exam    Nursing note and vitals reviewed.  Constitutional: She appears well-developed and well-nourished. She is not diaphoretic. No distress.   Well-appearing   HENT:   Head: Normocephalic and atraumatic.   Eyes: EOM are normal.   Neck: Normal range of motion. Neck supple.   Cardiovascular: Normal rate, regular rhythm and normal heart sounds. Exam reveals no gallop and no friction rub.    No murmur heard.  Pulmonary/Chest: Breath sounds normal. No respiratory distress. She has no wheezes. She has no rhonchi. She has no rales.   Musculoskeletal: Normal range of motion.   Neurological: She is alert and oriented to person, place, and time.   No tremors   Skin: Skin is warm and dry.   Psychiatric: She has a normal mood and affect. Her behavior is normal. Judgment and thought content normal.         ED Course   Procedures  Labs Reviewed   COMPREHENSIVE METABOLIC PANEL - Abnormal; Notable for the following components:       Result Value    eGFR if  56 (*)     eGFR if non  49 (*)     All other components within normal limits   CBC W/ AUTO DIFFERENTIAL   POCT GLUCOSE          Imaging Results    None          Medical Decision Making:   History:   Old Medical Records: I decided to obtain old medical records.  Old Records Summarized: records from clinic visits.  Clinical Tests:   Lab Tests: Ordered and Reviewed            Scribe Attestation:   Scribe #1: I performed the above scribed service and the documentation accurately describes the services I performed. I attest to the accuracy of the note.    I, Dr. Moore, personally performed the services described in this documentation. All medical record entries  made by the scribe were at my direction and in my presence.  I have reviewed the chart and agree that the record reflects my personal performance and is accurate and complete.10:20 PM 04/28/2020            ED Course as of Apr 28 2135 Tue Apr 28, 2020 1907 BP(!): 194/84 [EF]   1907 Temp: 99.3 °F (37.4 °C) [EF]   1907 Temp src: Oral [EF]   1907 Pulse: 78 [EF]   1907 Resp: 16 [EF]   1907 SpO2: 99 % [EF]   2040 74-year-old female newly diagnosed diabetic presents to the ER with hypoglycemia last night almost midnight.  She felt like she had further episodes today and endorses symptoms of intermittent tremors and feeling clammy but she would check her blood sugar and it was normal.  no chest pain shortness of breath nothing concerning for angina.  Labs in the ER are unremarkable.  Blood sugar 100 on metabolic panel.  Blood pressure at this time is fine.  She can be discharged home.  No sign of any emergent condition.  Call primary care tomorrow.  She states the tremors and feeling clammy were present before starting the metformin.  She has had these symptoms for months.  She presented to the ER today because of the hypoglycemia last night.   [EF]      ED Course User Index  [EF] Kyle Moore MD                Clinical Impression:       ICD-10-CM ICD-9-CM   1. Hypoglycemia E16.2 251.2         Disposition:   Disposition: Discharged  Condition: Stable     ED Disposition Condition    Discharge Stable        ED Prescriptions     None        Follow-up Information     Follow up With Specialties Details Why Contact Info    Narayan Myers MD Family Medicine Call in 1 day  [address] [facility] [address]  [phone]      [facility] Emergency Medicine  As needed, If symptoms worsen [address] [facility] [address] [phone]                                     Kyle Moore MD  04/28/20 7050

## 2020-04-29 NOTE — PROGRESS NOTES
Pre-Visit Chart Review  For Appointment Scheduled on 4-30-20    Health Maintenance Due   Topic Date Due    DEXA SCAN  03/29/2020

## 2020-04-29 NOTE — ED PROVIDER NOTES
Encounter Date: 4/29/2020       History     Chief Complaint   Patient presents with    Dizziness     pt complains of dizziness, nausea, and feeling lightheaded     74-year-old female seen by myself earlier tonight presents again to the ER for feeling slightly nauseated and a little dizzy.  States she felt like her blood sugar was low again but checked it and it was 100.  No slurred speech no facial droop no difficulty speaking or swallowing.  Also complaining of feeling tremulous again and also feeling anxious.  Again she reports she has had these symptoms multiple times over the last few months with no diagnosis.  She acknowledges that she is anxious at this time.  No chest pain no shortness of breath.        Review of patient's allergies indicates:   Allergen Reactions    No known drug allergies      Past Medical History:   Diagnosis Date    Allergy     Cataract     Colon polyp     Degenerative arthritis of knee 4/3/2012    Depression 3/26/2012    Diverticulosis     GERD (gastroesophageal reflux disease)     Hyperlipidemia     Hypertension     Multinodular goiter 3/26/2012    Myopathy, unspecified 1/18/2010    New onset type 2 diabetes mellitus 3/24/2020    New onset type 2 diabetes mellitus 3/24/2020    Tuberculosis      Past Surgical History:   Procedure Laterality Date    BREAST BIOPSY Left 2015    benign    COLONOSCOPY  12/2/15    Dr. Britton, multiple polyps, recheck five years-three years if more than 2 polyps are adenomatous    COLONOSCOPY N/A 12/2/2015    COLONOSCOPY N/A 3/20/2019    Procedure: COLONOSCOPY;  Surgeon: Jose Britton MD;  Location: [facility];  Service: Endoscopy;  Laterality: N/A;    FOOT SURGERY      HYSTERECTOMY      KNEE SURGERY  06/06/2013    right knee tear Dr Iverson     LUNG LOBECTOMY  1966    right middle lobectomy, due to Tb    OOPHORECTOMY      SHOULDER SURGERY      francis      Family History   Problem Relation Age of Onset    Colon cancer Other      Cancer Other     Cancer Mother     Hypertension Mother     Cancer Brother     Hypertension Father     Diabetes Son     Hypertension Son     Alcohol abuse Son     Diabetes Maternal Aunt     Alzheimer's disease Maternal Uncle     Cancer Maternal Grandmother     No Known Problems Daughter     Dementia Sister     Alzheimer's disease Sister     Breast cancer Sister 60    Obesity Paternal Uncle     Melanoma Neg Hx     Psoriasis Neg Hx     Lupus Neg Hx     Eczema Neg Hx     Amblyopia Neg Hx     Blindness Neg Hx     Cataracts Neg Hx     Glaucoma Neg Hx     Macular degeneration Neg Hx     Retinal detachment Neg Hx     Strabismus Neg Hx     Stroke Neg Hx     Thyroid disease Neg Hx      Social History     Tobacco Use    Smoking status: Former Smoker    Smokeless tobacco: Never Used    Tobacco comment: quit in 1963   Substance Use Topics    Alcohol use: Yes     Comment: Occasionally    Drug use: No     Review of Systems   Respiratory: Negative for chest tightness.    Cardiovascular: Negative for chest pain.   Gastrointestinal: Positive for nausea.   Neurological: Positive for dizziness and light-headedness.   Psychiatric/Behavioral: The patient is nervous/anxious.        Physical Exam     Initial Vitals [04/29/20 0338]   BP Pulse Resp Temp SpO2   (!) 197/80 81 17 -- 97 %      MAP       --         Physical Exam    Nursing note and vitals reviewed.  Constitutional: She appears well-developed and well-nourished.   HENT:   Head: Normocephalic and atraumatic.   Eyes: EOM are normal.   No nystagmus   Neck: Normal range of motion. Neck supple.   Cardiovascular: Normal rate, regular rhythm and normal heart sounds. Exam reveals no gallop and no friction rub.    No murmur heard.  Pulmonary/Chest: Breath sounds normal. No respiratory distress. She has no wheezes. She has no rhonchi. She has no rales.   Musculoskeletal: Normal range of motion.   Neurological: She is alert and oriented to person, place, and  time.   Normal finger to nose   Skin: Skin is warm and dry.   Psychiatric: Her behavior is normal. Judgment and thought content normal. Her mood appears anxious.         ED Course   Procedures  Labs Reviewed   POCT GLUCOSE MONITORING CONTINUOUS          Imaging Results    None                            ED Course as of Apr 29 0553 Wed Apr 29, 2020   0343 Presents again to the emergency room for vague dizziness and lightheadedness.  Also some nausea.  Felt like her blood sugar was low when she checked and was normal.  Acknowledges she is very anxious.  She is visibly anxious.  She has no findings on exam to suggest a cerebellar cause of her symptoms.  Normal finger to nose.  There is no nystagmus.  No slurred speech.  Patient is very well-appearing.  No chest pain no shortness of breath to suggest any anginal cause of her symptoms.  She will be given a Xanax and observed.    [EF]   0416 Feels much better at this time.  All symptoms resolved.    [EF]   0443 74-year-old presents to the ER for vague dizziness nausea, anxiety.  Symptoms completely resolved with Xanax 0.25 mg. Blood pressure normal at this time.  I have absolutely no concern for MI, angina, cerebellar pathology such as stroke or bleed.  Symptoms consistent with mild anxiety.  Likely secondary to her previous episode of hypoglycemia and new diagnosis of diabetes.  Patient will be discharged home.    [EF]      ED Course User Index  [EF] Kyle Moore MD                Clinical Impression:       ICD-10-CM ICD-9-CM   1. Dizziness R42 780.4   2. Anxiety F41.9 300.00                          Kyle Moore MD  04/29/20 05

## 2020-04-30 ENCOUNTER — TELEPHONE (OUTPATIENT)
Dept: FAMILY MEDICINE | Facility: CLINIC | Age: 75
End: 2020-04-30

## 2020-04-30 ENCOUNTER — OFFICE VISIT (OUTPATIENT)
Dept: FAMILY MEDICINE | Facility: CLINIC | Age: 75
End: 2020-04-30
Attending: FAMILY MEDICINE
Payer: MEDICARE

## 2020-04-30 VITALS
BODY MASS INDEX: 27.25 KG/M2 | DIASTOLIC BLOOD PRESSURE: 72 MMHG | HEART RATE: 109 BPM | TEMPERATURE: 99 F | WEIGHT: 163.56 LBS | HEIGHT: 65 IN | SYSTOLIC BLOOD PRESSURE: 128 MMHG | OXYGEN SATURATION: 98 %

## 2020-04-30 DIAGNOSIS — I15.2 HYPERTENSION ASSOCIATED WITH DIABETES: ICD-10-CM

## 2020-04-30 DIAGNOSIS — N30.00 ACUTE CYSTITIS WITHOUT HEMATURIA: ICD-10-CM

## 2020-04-30 DIAGNOSIS — F41.9 ANXIETY: ICD-10-CM

## 2020-04-30 DIAGNOSIS — E11.69 HYPERLIPIDEMIA ASSOCIATED WITH TYPE 2 DIABETES MELLITUS: ICD-10-CM

## 2020-04-30 DIAGNOSIS — I73.9 PAD (PERIPHERAL ARTERY DISEASE): ICD-10-CM

## 2020-04-30 DIAGNOSIS — F32.A DEPRESSION, UNSPECIFIED DEPRESSION TYPE: ICD-10-CM

## 2020-04-30 DIAGNOSIS — N18.30 CHRONIC KIDNEY DISEASE, STAGE III (MODERATE): ICD-10-CM

## 2020-04-30 DIAGNOSIS — R30.0 DYSURIA: Primary | ICD-10-CM

## 2020-04-30 DIAGNOSIS — G60.9 HEREDITARY AND IDIOPATHIC PERIPHERAL NEUROPATHY: ICD-10-CM

## 2020-04-30 DIAGNOSIS — E78.5 HYPERLIPIDEMIA ASSOCIATED WITH TYPE 2 DIABETES MELLITUS: ICD-10-CM

## 2020-04-30 DIAGNOSIS — E11.59 HYPERTENSION ASSOCIATED WITH DIABETES: ICD-10-CM

## 2020-04-30 PROCEDURE — 1159F MED LIST DOCD IN RCRD: CPT | Mod: S$GLB,,, | Performed by: FAMILY MEDICINE

## 2020-04-30 PROCEDURE — 1159F PR MEDICATION LIST DOCUMENTED IN MEDICAL RECORD: ICD-10-PCS | Mod: S$GLB,,, | Performed by: FAMILY MEDICINE

## 2020-04-30 PROCEDURE — 3074F SYST BP LT 130 MM HG: CPT | Mod: CPTII,S$GLB,, | Performed by: FAMILY MEDICINE

## 2020-04-30 PROCEDURE — 1101F PT FALLS ASSESS-DOCD LE1/YR: CPT | Mod: CPTII,S$GLB,, | Performed by: FAMILY MEDICINE

## 2020-04-30 PROCEDURE — 1126F PR PAIN SEVERITY QUANTIFIED, NO PAIN PRESENT: ICD-10-PCS | Mod: S$GLB,,, | Performed by: FAMILY MEDICINE

## 2020-04-30 PROCEDURE — 99999 PR PBB SHADOW E&M-EST. PATIENT-LVL III: CPT | Mod: PBBFAC,,, | Performed by: FAMILY MEDICINE

## 2020-04-30 PROCEDURE — 3078F PR MOST RECENT DIASTOLIC BLOOD PRESSURE < 80 MM HG: ICD-10-PCS | Mod: CPTII,S$GLB,, | Performed by: FAMILY MEDICINE

## 2020-04-30 PROCEDURE — 87086 URINE CULTURE/COLONY COUNT: CPT

## 2020-04-30 PROCEDURE — 81002 POCT URINE DIPSTICK WITHOUT MICROSCOPE: ICD-10-PCS | Mod: S$GLB,,, | Performed by: FAMILY MEDICINE

## 2020-04-30 PROCEDURE — 1101F PR PT FALLS ASSESS DOC 0-1 FALLS W/OUT INJ PAST YR: ICD-10-PCS | Mod: CPTII,S$GLB,, | Performed by: FAMILY MEDICINE

## 2020-04-30 PROCEDURE — 81002 URINALYSIS NONAUTO W/O SCOPE: CPT | Mod: S$GLB,,, | Performed by: FAMILY MEDICINE

## 2020-04-30 PROCEDURE — 3078F DIAST BP <80 MM HG: CPT | Mod: CPTII,S$GLB,, | Performed by: FAMILY MEDICINE

## 2020-04-30 PROCEDURE — 3074F PR MOST RECENT SYSTOLIC BLOOD PRESSURE < 130 MM HG: ICD-10-PCS | Mod: CPTII,S$GLB,, | Performed by: FAMILY MEDICINE

## 2020-04-30 PROCEDURE — 1126F AMNT PAIN NOTED NONE PRSNT: CPT | Mod: S$GLB,,, | Performed by: FAMILY MEDICINE

## 2020-04-30 PROCEDURE — 99214 PR OFFICE/OUTPT VISIT, EST, LEVL IV, 30-39 MIN: ICD-10-PCS | Mod: 25,S$GLB,, | Performed by: FAMILY MEDICINE

## 2020-04-30 PROCEDURE — 99214 OFFICE O/P EST MOD 30 MIN: CPT | Mod: 25,S$GLB,, | Performed by: FAMILY MEDICINE

## 2020-04-30 PROCEDURE — 99999 PR PBB SHADOW E&M-EST. PATIENT-LVL III: ICD-10-PCS | Mod: PBBFAC,,, | Performed by: FAMILY MEDICINE

## 2020-04-30 RX ORDER — AMOXICILLIN AND CLAVULANATE POTASSIUM 875; 125 MG/1; MG/1
1 TABLET, FILM COATED ORAL 2 TIMES DAILY
Qty: 14 TABLET | Refills: 0 | Status: SHIPPED | OUTPATIENT
Start: 2020-04-30 | End: 2020-05-12 | Stop reason: CLARIF

## 2020-04-30 NOTE — TELEPHONE ENCOUNTER
----- Message from Tim Mckeon sent at 4/30/2020 12:05 PM CDT -----  Name of Caller Patient  Reason for Visit/Symptoms  UTI, BP, diabetes  Best Contact Number or Confirm if Mychart Preferred  Preferred Date/Time of Appointment  ASAP  Interested in Virtual Visit (yes/no)  No  Additional Information Patient states that she would like a callback from Shefali regarding an appointment

## 2020-04-30 NOTE — TELEPHONE ENCOUNTER
The blood sugar log looks very good and is consistent with the A1c results.  No changes are needed.

## 2020-04-30 NOTE — TELEPHONE ENCOUNTER
Due to Dr Myers booking out 4-30-20 patient was seen by Dr Jara.   She left a glucose log. On your desk.

## 2020-04-30 NOTE — PROGRESS NOTES
Subjective:       Patient ID: Erica Masterson is a 74 y.o. female.    Chief Complaint: leg tightness    Patient of Dr. Myers's presents here today with multiple vague complaints.  Her 1st complaint is of some tightness in the entire right side of her body which comes from her axilla to her calf.  She states this happens randomly and when it occurs it feels like a cramp.  Her next complaint is consistent low blood sugar.  She has been to the ER twice in the last 3 days for bouts of hypoglycemia.  She had called the clinic with concerns about this and was told reduce her metformin to only 1 pill a day.  She has been checking her blood sugar regularly and is not gotten below the 70s since her last ER visit.  However she still notes that she feels weak and nauseated.  She notes that she feels this the most when waking up.  This has been going on for over a month now.  She also complains of some vague symptoms of dry mouth.  Dr. Myers had tested her for Sjogren syndrome which was negative and refer her to ENT.  She has not had that appointment at.  She is also complaining of some dysuria and frequent urination.  She states she has had UTI past and this feels somewhat similar.    Patient is a difficult historian and that she does not explain her complaints very well.  She will sometimes not even speak in complete sentences.        Review of Systems   Constitutional: Negative for activity change, chills and fever.   HENT: Negative for congestion, sinus pressure and sinus pain.    Eyes: Negative for itching.   Respiratory: Negative for chest tightness and shortness of breath.    Cardiovascular: Positive for palpitations. Negative for chest pain.   Gastrointestinal: Positive for nausea. Negative for abdominal pain, constipation and vomiting.   Endocrine: Negative for cold intolerance.   Genitourinary: Negative for difficulty urinating and menstrual problem.   Musculoskeletal: Negative for arthralgias, joint swelling and  myalgias.   Skin: Negative for rash.   Allergic/Immunologic: Negative for environmental allergies.   Neurological: Positive for weakness. Negative for dizziness and headaches.   Psychiatric/Behavioral: Negative for agitation and confusion.       Objective:      Physical Exam   Constitutional: She is oriented to person, place, and time. She appears well-developed and well-nourished.   HENT:   Head: Normocephalic and atraumatic.   Eyes: Pupils are equal, round, and reactive to light. EOM are normal.   Neck: Normal range of motion. Neck supple.   Cardiovascular: Normal rate and regular rhythm.   No murmur heard.  Pulmonary/Chest: Effort normal and breath sounds normal. No respiratory distress. She has no wheezes. She has no rales.   Abdominal: Soft. She exhibits no distension. There is no tenderness. There is no guarding.   Musculoskeletal: Normal range of motion.   Neurological: She is alert and oriented to person, place, and time. She displays normal reflexes.   Skin: Skin is warm and dry.   Psychiatric: She has a normal mood and affect. Her behavior is normal. Judgment and thought content normal.   Nursing note and vitals reviewed.          Recent Results (from the past 168 hour(s))   CBC auto differential    Collection Time: 04/28/20  7:30 PM   Result Value Ref Range    WBC 5.97 3.90 - 12.70 K/uL    RBC 4.15 4.00 - 5.40 M/uL    Hemoglobin 12.2 12.0 - 16.0 g/dL    Hematocrit 38.1 37.0 - 48.5 %    Mean Corpuscular Volume 92 82 - 98 fL    Mean Corpuscular Hemoglobin 29.4 27.0 - 31.0 pg    Mean Corpuscular Hemoglobin Conc 32.0 32.0 - 36.0 g/dL    RDW 12.7 11.5 - 14.5 %    Platelets 279 150 - 350 K/uL    MPV 9.2 9.2 - 12.9 fL    Immature Granulocytes 0.0 0.0 - 0.5 %    Gran # (ANC) 3.4 1.8 - 7.7 K/uL    Immature Grans (Abs) 0.00 0.00 - 0.04 K/uL    Lymph # 2.0 1.0 - 4.8 K/uL    Mono # 0.4 0.3 - 1.0 K/uL    Eos # 0.2 0.0 - 0.5 K/uL    Baso # 0.03 0.00 - 0.20 K/uL    nRBC 0 0 /100 WBC    Gran% 56.5 38.0 - 73.0 %    Lymph%  33.3 18.0 - 48.0 %    Mono% 7.0 4.0 - 15.0 %    Eosinophil% 2.7 0.0 - 8.0 %    Basophil% 0.5 0.0 - 1.9 %    Differential Method Automated    Comprehensive metabolic panel    Collection Time: 04/28/20  7:30 PM   Result Value Ref Range    Sodium 141 136 - 145 mmol/L    Potassium 4.0 3.5 - 5.1 mmol/L    Chloride 105 95 - 110 mmol/L    CO2 26 23 - 29 mmol/L    Glucose 105 70 - 110 mg/dL    BUN, Bld 17 8 - 23 mg/dL    Creatinine 1.1 0.5 - 1.4 mg/dL    Calcium 9.9 8.7 - 10.5 mg/dL    Total Protein 7.7 6.0 - 8.4 g/dL    Albumin 4.1 3.5 - 5.2 g/dL    Total Bilirubin 0.3 0.1 - 1.0 mg/dL    Alkaline Phosphatase 57 55 - 135 U/L    AST 16 10 - 40 U/L    ALT 20 10 - 44 U/L    Anion Gap 10 8 - 16 mmol/L    eGFR if African American 56 (A) >60 mL/min/1.73 m^2    eGFR if non African American 49 (A) >60 mL/min/1.73 m^2   POCT glucose    Collection Time: 04/28/20  7:44 PM   Result Value Ref Range    POCT Glucose 86 70 - 110 mg/dL   POCT glucose    Collection Time: 04/29/20  3:42 AM   Result Value Ref Range    POCT Glucose 114 (H) 70 - 110 mg/dL     Urine dipstick shows positive leukocytes and positive protein.    Assessment:       1. Dysuria    2. Acute cystitis without hematuria    3. Hereditary and idiopathic peripheral neuropathy    4. Depression, unspecified depression type    5. Anxiety    6. Hypertension associated with diabetes    7. Hyperlipidemia associated with type 2 diabetes mellitus    8. PAD (peripheral artery disease)    9. Chronic kidney disease, stage III (moderate), eGFR 47, 2016    10. BMI 26.0-26.9,adult        Plan:         -no abnormalities on physical exam.  Patient's complaints are vague and nonspecific.  -as I am not this patient's PCP I am reluctant to make any change to her diabetes medications.  She was recently diagnosed as a diabetic and is seen Diabetes Education.  She is only on metformin and that should not be causing hypoglycemia.  She may require more advanced workup done down the line.  -will treat  patient's UTI with Augmentin as she has used this in the past with success.  Will send the urine for culture.  - advised patient to keep ENT appointment.  -her low right-sided cramping may be due to electrolyte imbalance.  Her previous blood work did not show any however there was no magnesium or calcium checked.  Advised patient to try low-dose magnesium supplement to see if that resolves or cramping.  -she should follow-up with her PCP Dr. Myers as soon as possible.

## 2020-05-01 ENCOUNTER — DOCUMENTATION ONLY (OUTPATIENT)
Dept: FAMILY MEDICINE | Facility: CLINIC | Age: 75
End: 2020-05-01

## 2020-05-01 ENCOUNTER — OFFICE VISIT (OUTPATIENT)
Dept: FAMILY MEDICINE | Facility: CLINIC | Age: 75
End: 2020-05-01
Attending: FAMILY MEDICINE
Payer: MEDICARE

## 2020-05-01 VITALS
SYSTOLIC BLOOD PRESSURE: 94 MMHG | BODY MASS INDEX: 27.22 KG/M2 | OXYGEN SATURATION: 98 % | HEART RATE: 100 BPM | DIASTOLIC BLOOD PRESSURE: 58 MMHG | RESPIRATION RATE: 20 BRPM | TEMPERATURE: 98 F | WEIGHT: 163.38 LBS | HEIGHT: 65 IN

## 2020-05-01 DIAGNOSIS — E11.9 NEW ONSET TYPE 2 DIABETES MELLITUS: ICD-10-CM

## 2020-05-01 DIAGNOSIS — E16.2 HYPOGLYCEMIA: Primary | ICD-10-CM

## 2020-05-01 DIAGNOSIS — N30.00 ACUTE CYSTITIS WITHOUT HEMATURIA: ICD-10-CM

## 2020-05-01 LAB — BACTERIA UR CULT: NORMAL

## 2020-05-01 PROCEDURE — 3074F SYST BP LT 130 MM HG: CPT | Mod: CPTII,S$GLB,, | Performed by: FAMILY MEDICINE

## 2020-05-01 PROCEDURE — 1159F MED LIST DOCD IN RCRD: CPT | Mod: S$GLB,,, | Performed by: FAMILY MEDICINE

## 2020-05-01 PROCEDURE — 3078F DIAST BP <80 MM HG: CPT | Mod: CPTII,S$GLB,, | Performed by: FAMILY MEDICINE

## 2020-05-01 PROCEDURE — 1159F PR MEDICATION LIST DOCUMENTED IN MEDICAL RECORD: ICD-10-PCS | Mod: S$GLB,,, | Performed by: FAMILY MEDICINE

## 2020-05-01 PROCEDURE — 99213 PR OFFICE/OUTPT VISIT, EST, LEVL III, 20-29 MIN: ICD-10-PCS | Mod: S$GLB,,, | Performed by: FAMILY MEDICINE

## 2020-05-01 PROCEDURE — 3074F PR MOST RECENT SYSTOLIC BLOOD PRESSURE < 130 MM HG: ICD-10-PCS | Mod: CPTII,S$GLB,, | Performed by: FAMILY MEDICINE

## 2020-05-01 PROCEDURE — 99213 OFFICE O/P EST LOW 20 MIN: CPT | Mod: S$GLB,,, | Performed by: FAMILY MEDICINE

## 2020-05-01 PROCEDURE — 99499 RISK ADDL DX/OHS AUDIT: ICD-10-PCS | Mod: S$GLB,,, | Performed by: FAMILY MEDICINE

## 2020-05-01 PROCEDURE — 1101F PR PT FALLS ASSESS DOC 0-1 FALLS W/OUT INJ PAST YR: ICD-10-PCS | Mod: CPTII,S$GLB,, | Performed by: FAMILY MEDICINE

## 2020-05-01 PROCEDURE — 99999 PR PBB SHADOW E&M-EST. PATIENT-LVL III: ICD-10-PCS | Mod: PBBFAC,,, | Performed by: FAMILY MEDICINE

## 2020-05-01 PROCEDURE — 1101F PT FALLS ASSESS-DOCD LE1/YR: CPT | Mod: CPTII,S$GLB,, | Performed by: FAMILY MEDICINE

## 2020-05-01 PROCEDURE — 3078F PR MOST RECENT DIASTOLIC BLOOD PRESSURE < 80 MM HG: ICD-10-PCS | Mod: CPTII,S$GLB,, | Performed by: FAMILY MEDICINE

## 2020-05-01 PROCEDURE — 99999 PR PBB SHADOW E&M-EST. PATIENT-LVL III: CPT | Mod: PBBFAC,,, | Performed by: FAMILY MEDICINE

## 2020-05-01 PROCEDURE — 1126F PR PAIN SEVERITY QUANTIFIED, NO PAIN PRESENT: ICD-10-PCS | Mod: S$GLB,,, | Performed by: FAMILY MEDICINE

## 2020-05-01 PROCEDURE — 1126F AMNT PAIN NOTED NONE PRSNT: CPT | Mod: S$GLB,,, | Performed by: FAMILY MEDICINE

## 2020-05-01 PROCEDURE — 99499 UNLISTED E&M SERVICE: CPT | Mod: S$GLB,,, | Performed by: FAMILY MEDICINE

## 2020-05-01 RX ORDER — MAGNESIUM 250 MG
1 TABLET ORAL DAILY
COMMUNITY
End: 2020-06-30

## 2020-05-01 NOTE — PROGRESS NOTES
Pre-Visit Chart Review  For Appointment Scheduled on 5-1-20    Health Maintenance Due   Topic Date Due    DEXA SCAN  03/29/2020

## 2020-05-01 NOTE — PATIENT INSTRUCTIONS
Magnesium (Blood)  Does this test have other names?  Mg  What is this test?  This test measures the amount of the mineral magnesium in your blood. Magnesium is found in your cells and bones. It's necessary for many different chemical reactions in your body. Your heart needs magnesium to beat properly. Your muscles need magnesium to contract and relax. Your nerves need magnesium to send signals. Magnesium also plays a role in controlling blood sugar and blood pressure. Your body uses magnesium to absorb calcium.  Why do I need this test?  You may have this test if you have signs and symptoms that might be caused by too much or too little magnesium. These can include:  · Weakness  · Muscle cramps  · Numbness or tingling  · Irregular heartbeat  · Nausea  · Diarrhea  · Confusion or slurred speech  · Seizures  You may also have this test if you have kidney problems, diabetes, alcoholism, or some other conditions, or if other blood tests show you have abnormal levels of other minerals, such as calcium, potassium, or phosphorus.  If you are pregnant, your healthcare provider may watch your magnesium levels to make sure you don't develop preeclampsia. This is a serious complication marked by protein in your urine and high blood pressure.  What other tests might I have along with this test?  You may have other blood tests to measure levels of other minerals or substances in your blood. You may also have a test to check for magnesium in your urine.  What do my test results mean?  Many things may affect your lab test results. These include the method each lab uses to do the test. Even if your test results are different from the normal value, you may not have a problem. To learn what the results mean for you, talk with your healthcare provider.  Results are given in milliequivalents per liter (mEq/L). Normal test results are:  · 1.3 to 2.1 mEq/L for adults  · 1.4 to 1.7 mEq/L for children  · 1.4 to 2 mEq/L for  newborns  Abnormal magnesium levels may have many possible causes. For example, increased levels of magnesium may be seen with kidney disease because magnesium is excreted by the kidneys. A low magnesium level can be a sign of diabetes, some digestive problems, malnourishment, or long-term (chronic) alcoholism. Lower magnesium levels during pregnancy may mean preeclampsia.  How is this test done?  The test requires a blood sample, which is drawn through a needle from a vein in your arm.  Does this test pose any risks?  Taking a blood sample with a needle carries risks that include bleeding, infection, bruising, or feeling dizzy. When the needle pricks your arm, you may feel a slight stinging sensation or pain. Afterward, the site may be slightly sore.  What might affect my test results?  Some medicines such as antacids and laxatives can cause magnesium levels to rise. Medicines such as some antibiotics, insulin, and water pills (diuretics) can cause magnesium levels to drop.  How do I get ready for this test?  You don't need to prepare for this test. But be sure your healthcare provider knows about all medicines, herbs, vitamins, and supplements you are taking. This includes medicines that don't need a prescription and any illicit drugs you may use.  © 6379-5597 [employer]. [address] [place] [address], [place] [address]. All rights reserved. This information is not intended as a substitute for professional medical care. Always follow your healthcare professional's instructions.

## 2020-05-01 NOTE — PROGRESS NOTES
Subjective:       Patient ID: Erica Masterson is a 74 y.o. female.    Chief Complaint: Medication Reaction    74-year-old female recently found to be new onset diabetes has had a visit with diabetic educator and changed her eating habits.  She was started on low-dose metformin extended release 500 mg once a day and her starting A1c was 6.5.  For several days she was doing well with her blood sugars ranging in the 89 to 114 range.  Three days ago she awakened feeling dizzy lightheaded and weak and checked her blood sugar finding it was 28.  She went to the emergency room after eating something and her sugar came up to 89.  The following day she returned to the emergency room although she did not have an episode of hypoglycemia documented at that time.  The day after the ER visit she was seen in the office with urinary complaints and was found to have a UTI and placed on Augmentin.  She has not had another episode of hypoglycemia as of yet.  She was started on Crestor and developed myalgias and is off of the Crestor at this time.  I use the identification apnea to positively identify her metformin as extended release metformin 500 mg and her Augmentin was positively identified as well.  Interestingly enough they are both large white caplets and one of her Augmentin was in her metformin bottle.  She is complaining of some GI upset with the metformin that started before she began using the Augmentin.    Past Medical History:  No date: Allergy  No date: Cataract  No date: Colon polyp  4/3/2012: Degenerative arthritis of knee  3/26/2012: Depression  No date: Diverticulosis  No date: GERD (gastroesophageal reflux disease)  No date: Hyperlipidemia  No date: Hypertension  3/26/2012: Multinodular goiter  1/18/2010: Myopathy, unspecified  3/24/2020: New onset type 2 diabetes mellitus  3/24/2020: New onset type 2 diabetes mellitus  No date: Tuberculosis    Past Surgical History:  2015: BREAST BIOPSY; Left      Comment:   benign  12/2/15: COLONOSCOPY      Comment:  Dr. Britton, multiple polyps, recheck five years-three                years if more than 2 polyps are adenomatous  12/2/2015: COLONOSCOPY; N/A  3/20/2019: COLONOSCOPY; N/A      Comment:  Procedure: COLONOSCOPY;  Surgeon: Jose Britton MD;                Location: [facility];  Service: Endoscopy;  Laterality:                N/A;  No date: FOOT SURGERY  No date: HYSTERECTOMY  06/06/2013: KNEE SURGERY      Comment:  right knee tear Dr Iverson   1966: LUNG LOBECTOMY      Comment:  right middle lobectomy, due to Tb  No date: OOPHORECTOMY  No date: SHOULDER SURGERY      Comment:  francis     Current Outpatient Medications on File Prior to Visit:  ALPRAZolam (XANAX) 0.25 MG tablet, Take 1 tablet (0.25 mg total) by mouth 3 (three) times daily as needed for Anxiety. (Patient taking differently: Take 0.125 mg by mouth 3 (three) times daily as needed for Anxiety. Taking 1/2 tab prn), Disp: 8 tablet, Rfl: 0  amoxicillin-clavulanate 875-125mg (AUGMENTIN) 875-125 mg per tablet, Take 1 tablet by mouth 2 (two) times daily., Disp: 14 tablet, Rfl: 0  aspirin (ECOTRIN) 81 MG EC tablet, Take 162 mg by mouth once daily. , Disp: , Rfl:   carboxymethylcellulose sodium 0.25 % Drop, Place 1 drop into both eyes once daily., Disp: , Rfl:   losartan (COZAAR) 100 MG tablet, Take 1 tablet (100 mg total) by mouth once daily. (Patient taking differently: Take 100 mg by mouth once daily. Taking 2 of her 50mg tabs to use up then will start this rx.), Disp: 90 tablet, Rfl: 3  magnesium 250 mg Tab, Take 1 tablet by mouth once daily., Disp: , Rfl:   metFORMIN (GLUCOPHAGE-XR) 500 MG XR 24hr tablet, Take 1 tablet (500 mg total) by mouth once daily. Take one every morning for one week then increase to one twice a day thereafter (Patient taking differently: Take 500 mg by mouth once daily. Take one every morning for one week then increase to one twice a day thereafter  Per patient taking 1 qd), Disp: 30 tablet,  Rfl: 5  (DISCONTINUED) erythromycin (ROMYCIN) ophthalmic ointment, Place into both eyes 2 (two) times daily. (Patient not taking: Reported on 5/1/2020), Disp: 3.5 g, Rfl: 0    No current facility-administered medications on file prior to visit.         Review of Systems   Constitutional: Positive for diaphoresis.   Musculoskeletal: Positive for myalgias (Myalgias have resolved since holding the Crestor).   Neurological: Positive for weakness and numbness (Numbness and tingling in the feet has improved since starting the metformin).       Objective:      Physical Exam   Constitutional: She appears well-developed and well-nourished. No distress.   Good blood pressure control  Normal weight with a BMI of 27.2 she is down 4 lb from her April 2, 2020 visit   Skin: She is not diaphoretic.   Psychiatric: She has a normal mood and affect. Her behavior is normal. Judgment and thought content normal.   Nursing note and vitals reviewed.      Assessment:       1. Hypoglycemia    2. New onset type 2 diabetes mellitus    3. Acute cystitis without hematuria        Plan:       1. Hypoglycemia  Related to new onset diabetes with use of low-dose metformin.  May have been triggered by urinary tract infection.  Patient was told to hold the metformin until she has finished her antibiotic and then resume the metformin at the low dose once daily dose the day after finishing antibiotics.  If she continues to experience episodes of hypoglycemia.  The metformin and call me.    2. New onset type 2 diabetes mellitus  See above    3. Acute cystitis without hematuria  Symptoms of dysuria are improving.  Finish antibiotics    Patient was told to hold the Crestor until she has tolerated the metformin for at least two weeks without hypoglycemia and then start it every other day

## 2020-05-05 ENCOUNTER — HOSPITAL ENCOUNTER (EMERGENCY)
Facility: HOSPITAL | Age: 75
Discharge: HOME | End: 2020-05-05
Attending: EMERGENCY MEDICINE
Payer: MEDICARE

## 2020-05-05 ENCOUNTER — NURSE TRIAGE (OUTPATIENT)
Dept: ADMINISTRATIVE | Facility: CLINIC | Age: 75
End: 2020-05-05

## 2020-05-05 ENCOUNTER — PATIENT MESSAGE (OUTPATIENT)
Dept: ADMINISTRATIVE | Facility: HOSPITAL | Age: 75
End: 2020-05-05

## 2020-05-05 ENCOUNTER — TELEPHONE (OUTPATIENT)
Dept: FAMILY MEDICINE | Facility: CLINIC | Age: 75
End: 2020-05-05

## 2020-05-05 VITALS
TEMPERATURE: 99 F | WEIGHT: 163 LBS | RESPIRATION RATE: 18 BRPM | DIASTOLIC BLOOD PRESSURE: 78 MMHG | BODY MASS INDEX: 27.16 KG/M2 | HEIGHT: 65 IN | HEART RATE: 109 BPM | SYSTOLIC BLOOD PRESSURE: 175 MMHG | OXYGEN SATURATION: 98 %

## 2020-05-05 DIAGNOSIS — G57.93 NEUROPATHY INVOLVING BOTH LOWER EXTREMITIES: Primary | ICD-10-CM

## 2020-05-05 DIAGNOSIS — R20.2 TINGLING IN EXTREMITIES: ICD-10-CM

## 2020-05-05 DIAGNOSIS — R42 DIZZINESS: Primary | ICD-10-CM

## 2020-05-05 DIAGNOSIS — R20.2 PARESTHESIA: ICD-10-CM

## 2020-05-05 PROCEDURE — 99283 EMERGENCY DEPT VISIT LOW MDM: CPT

## 2020-05-05 RX ORDER — LANCETS
EACH MISCELLANEOUS
Qty: 100 EACH | Refills: 3 | Status: SHIPPED | OUTPATIENT
Start: 2020-05-05 | End: 2021-12-28 | Stop reason: CLARIF

## 2020-05-05 RX ORDER — GABAPENTIN 100 MG/1
100 CAPSULE ORAL NIGHTLY PRN
Qty: 7 CAPSULE | Refills: 1 | Status: SHIPPED | OUTPATIENT
Start: 2020-05-05 | End: 2020-05-15

## 2020-05-05 RX ORDER — INSULIN PUMP SYRINGE, 3 ML
EACH MISCELLANEOUS
Qty: 1 EACH | Refills: 0 | Status: SHIPPED | OUTPATIENT
Start: 2020-05-05 | End: 2023-10-04

## 2020-05-05 NOTE — TELEPHONE ENCOUNTER
Patient has had multiple er visits and office visits for dizziness, having tingling in lower extremities from feet to mid-thigh. Also in in right hand and arm. Went to er again this morning. Was prescribed Gabapentin. Do you want to refer to neurology?

## 2020-05-05 NOTE — TELEPHONE ENCOUNTER
----- Message from Aravind Ramirez sent at 5/5/2020 11:11 AM CDT -----  Contact: pt  Type:  Patient Requesting Referral    Who Called:  pt  Does the patient already have the specialty appointment scheduled?:  no  If yes, what is the date of that appointment?:    Referral to What Specialty:  neurology  Reason for Referral:  hosp reccomendations  Does the patient want the referral with a specific physician?:  Dr Clarence Mcleod  Is the specialist an [facility]?:  non  Patient Requesting a Call Back?:  yes  Best Call Back Number:  [phone]  Additional Information:

## 2020-05-05 NOTE — TELEPHONE ENCOUNTER
----- Message from Nevaeh Cabrera MA sent at 5/5/2020  8:10 AM CDT -----  Contact: self  Patient requesting to speak to Nurse Shefali regarding she is still having problems and will like to know what do Dr. Myers recommend.    Patient states she is having the same following symptoms  muscles tight and tingling ,from feet to 1/2 thigh, and right hand and right arm tingling.and muscle tight in mouth     160/87 glucose 117 pulse 88 8am this morning per patient       Patient states the above symptoms occurred Friday and paramedics came but said everything looked to be okay     Transferred patient to triage nurse     Please contact patient at [phone] (home) [phone] (work)

## 2020-05-05 NOTE — TELEPHONE ENCOUNTER
"Erica states she has been having right arm tingling and weakness, did not come on suddenly, but "it krept up on me, and also my face has a numbness around my mouth".  She states she formerly had tingling and numbness in both legs, toes to thighs, and pcp recommended she stop statin med, which she did, and it improved. Now has upper right symptoms, did not come on suddenly.  The weakness and lightheadedness occurs with symptoms. She is vague in describing symptoms, order occurring, timing, and severity of same. Per [facility] triage protocol, recommend ED now for evaluation.  Message to Narayan Myers MD, pcp.  Please contact caller directly with any additional care advice.      Reason for Disposition   Headache (with neurologic deficit)    Additional Information   Negative: Difficult to awaken or acting confused (e.g., disoriented, slurred speech)   Negative: New neurologic deficit that is present NOW, sudden onset of ANY of the following: * Weakness of the face, arm, or leg on one side of the body * Numbness of the face, arm, or leg on one side of the body * Loss of speech or garbled speech   Negative: Sounds like a life-threatening emergency to the triager   Negative: Confusion, disorientation, or hallucinations is the main symptom   Negative: Dizziness is the main symptom   Negative: Followed a head injury within last 3 days    Protocols used: NEUROLOGIC DEFICIT-A-OH      "

## 2020-05-05 NOTE — ED PROVIDER NOTES
"Encounter Date: 5/5/2020    SCRIBE #1 NOTE: IJennifer am scribing for, and in the presence of, Kimani Hughes MD.       History     Chief Complaint   Patient presents with    bilateral leg numbness     diabetic      Time seen by provider: 9:44 AM on 05/05/2020    Erica Masterson is a 74 y.o. female with PMHx of DM II who presents to the ED with an onset of bilateral leg numbness and burning that started "several months ago". Her symptoms worsened since yesterday. Patient has not taken any medications for the burning sensation or had any testing related to potential causes. She is still able to walk with normal gait. She saw her doctor last week and was given no prescriptions for the intermittent numbness and burning sensation in legs. Her blood sugar has been normal. No Hx of arthritis, back pain, or neck pain. The patient denies fever, rash, swollen joints, trauma, unexpected weight loss, or any other symptoms at this time. Orthopedic PSHx of shoulder, knee, and foot surgery.       The history is provided by the patient.     Review of patient's allergies indicates:   Allergen Reactions    No known drug allergies      Past Medical History:   Diagnosis Date    Allergy     Cataract     Colon polyp     Degenerative arthritis of knee 4/3/2012    Depression 3/26/2012    Diverticulosis     GERD (gastroesophageal reflux disease)     Hyperlipidemia     Hypertension     Multinodular goiter 3/26/2012    Myopathy, unspecified 1/18/2010    New onset type 2 diabetes mellitus 3/24/2020    New onset type 2 diabetes mellitus 3/24/2020    Tuberculosis      Past Surgical History:   Procedure Laterality Date    BREAST BIOPSY Left 2015    benign    COLONOSCOPY  12/2/15    Dr. Britton, multiple polyps, recheck five years-three years if more than 2 polyps are adenomatous    COLONOSCOPY N/A 12/2/2015    COLONOSCOPY N/A 3/20/2019    Procedure: COLONOSCOPY;  Surgeon: Jose Britton MD;  Location: [facility];  " Service: Endoscopy;  Laterality: N/A;    FOOT SURGERY      HYSTERECTOMY      KNEE SURGERY  06/06/2013    right knee tear Dr Iverson     LUNG LOBECTOMY  1966    right middle lobectomy, due to Tb    OOPHORECTOMY      SHOULDER SURGERY      francis      Family History   Problem Relation Age of Onset    Colon cancer Other     Cancer Other     Cancer Mother     Hypertension Mother     Cancer Brother     Hypertension Father     Diabetes Son     Hypertension Son     Alcohol abuse Son     Diabetes Maternal Aunt     Alzheimer's disease Maternal Uncle     Cancer Maternal Grandmother     No Known Problems Daughter     Dementia Sister     Alzheimer's disease Sister     Breast cancer Sister 60    Obesity Paternal Uncle     Melanoma Neg Hx     Psoriasis Neg Hx     Lupus Neg Hx     Eczema Neg Hx     Amblyopia Neg Hx     Blindness Neg Hx     Cataracts Neg Hx     Glaucoma Neg Hx     Macular degeneration Neg Hx     Retinal detachment Neg Hx     Strabismus Neg Hx     Stroke Neg Hx     Thyroid disease Neg Hx      Social History     Tobacco Use    Smoking status: Former Smoker    Smokeless tobacco: Never Used    Tobacco comment: quit in 1963   Substance Use Topics    Alcohol use: Yes     Comment: Occasionally    Drug use: No     Review of Systems   Constitutional: Negative for fever and unexpected weight change.   HENT: Negative for congestion.    Eyes: Negative for visual disturbance.   Respiratory: Negative for wheezing.    Cardiovascular: Negative for chest pain.   Gastrointestinal: Negative for abdominal pain.   Genitourinary: Negative for dysuria.   Musculoskeletal: Negative for joint swelling.   Skin: Negative for rash.   Neurological: Positive for numbness. Negative for syncope.        Positive for burning sensation.   Hematological: Does not bruise/bleed easily.   Psychiatric/Behavioral: Negative for confusion.       Physical Exam     Initial Vitals [05/05/20 0928]   BP Pulse Resp Temp SpO2    (!) 175/78 109 18 98.6 °F (37 °C) 98 %      MAP       --         Physical Exam    Nursing note and vitals reviewed.  Constitutional: She appears well-nourished.   HENT:   Head: Normocephalic and atraumatic.   Eyes: Conjunctivae and EOM are normal.   Neck: Normal range of motion. Neck supple. No thyroid mass present.   Cardiovascular: Normal rate, regular rhythm and normal heart sounds. Exam reveals no gallop and no friction rub.    No murmur heard.  Pulmonary/Chest: Breath sounds normal. She has no wheezes. She has no rhonchi. She has no rales.   Abdominal: Soft. Normal appearance and bowel sounds are normal. There is no tenderness.   Musculoskeletal:   Bilateral straight leg raise. Plantar and dorsiflexion. Lower extremity extension. No back pain with palpation. Bilateral lower extremities. No saddle anesthesia.    Neurological: She is alert and oriented to person, place, and time. She has normal strength. No cranial nerve deficit or sensory deficit.   5/5 strength bilaterally.    Skin: Skin is warm and dry. No rash noted. No erythema.   Warm and well perfused.   Psychiatric: She has a normal mood and affect. Her speech is normal. Cognition and memory are normal.         ED Course   Procedures  Labs Reviewed - No data to display       Imaging Results    None          Medical Decision Making:   History:   Old Medical Records: I decided to obtain old medical records.  ED Management:  Pt interviewed and examined emergently. VSS. She has no acute neuro deficit on exam, also no focal or lateralizing deficits concerning for central process. No additional concerning s/s including SOB, CP, unilateral or BL LE swelling, calf tenderness. She denies a past history of MS, progressive neurodegenerative disorder. LEs are warm and well perfused, no stigmata of PAD, ischemic limb. I suspect symptoms most probably assoc with peripheral neuropathy, possibly diabetic. Pt educated about this and supportive care. She will be  initiated on low dose gabapentin at night to improve symptoms during sleep. She is asked to f/u with her PCP and neurology asap regarding further diagnostics/treatments. She is asked to return to the ED immed for any new, worsening or concerning symptoms. Pt agreeable and she was dc'd in stable condition.            Scribe Attestation:   Scribe #1: I performed the above scribed service and the documentation accurately describes the services I performed. I attest to the accuracy of the note.    I, Dr. Kimani Hughes, personally performed the services described in this documentation. All medical record entries made by the scribe were at my direction and in my presence.  I have reviewed the chart and agree that the record reflects my personal performance and is accurate and complete. Kimani Hughes MD.  5:45 PM 05/05/2020                        Clinical Impression:       ICD-10-CM ICD-9-CM   1. Neuropathy involving both lower extremities G57.93 356.9         Disposition:   Disposition: Discharged  Condition: Stable     ED Disposition Condition    Discharge Stable        ED Prescriptions     Medication Sig Dispense Start Date End Date Auth. Provider    gabapentin (NEURONTIN) 100 MG capsule Take 1 capsule (100 mg total) by mouth nightly as needed. 7 capsule 5/5/2020 5/12/2020 Kimani Hughes MD        Follow-up Information     Follow up With Specialties Details Why Contact Info    Narayan Myers MD Family Medicine Schedule an appointment as soon as possible for a visit   [address] [facility] [address]  [place] [address]  [phone]      Clarence Hernandez MD Vascular Neurology, Neurology Schedule an appointment as soon as possible for a visit   [address]  [address]  [place] [address]  [phone]                                       Kimani Hughes MD  05/05/20 3785

## 2020-05-05 NOTE — TELEPHONE ENCOUNTER
Pended referral with Dr. Adam Hernandez's info.   Triage Note - OB  Alejandro Lawler 20 y o  female MRN: [id number]  Unit/Bed#: L&D 329 Encounter: [id number]    OB TRIAGE NOTE  Alejandro Lawler  [id number]  2018  9:10 PM  L&D 329/L&D     ASSESS:  20 y o   36w4d, not ruptured    PLAN  #1  Rule out ROM: negative  · Neg ferning and nitrazine, no pooling +/- valsalva  · NST reactive  · Cervix closed/thick/high    #2  Discharge instructions  · Patient instructed to call if experiencing worsening contractions, vaginal bleeding, loss of fluid or decreased fetal movement  · Will follow up with OBGYN  D/w Dr Marjorie Obrien (covering for Dr Karen Hickman)  ______________    SUBJECTIVE    STEW: Estimated Date of Delivery: 10/6/18    HPI Chronology:  20 y o   36w4d presents with complaint of small leakage of fluid over the past 2 days  She doesn't think that she broke her water because she didn't feel an initial gush of fluid but has noted small amount of discharge continuously  She is here for further evaluation  She denies vaginal pruritis, abdominopelvic pain, dyspareunia, history of STDs  Contractions: absent  Leakage: vaginal discharge present  Bleeding: absent  Fetal Movement: present  Pelvic pain: absent    Vitals:   /69   Pulse 75   Temp 98 2 °F (36 8 °C)   Resp 16   Ht 5' 4" (1 626 m)   Wt 76 2 kg (168 lb)   LMP 2017 (Exact Date)   SpO2 99%   BMI 28 84 kg/m²   Body mass index is 28 84 kg/m²  Review of Systems   Constitutional: Negative  Respiratory: Negative  Cardiovascular: Negative  Gastrointestinal: Negative  Genitourinary: Negative  Musculoskeletal: Negative  Physical Exam   Constitutional: She is oriented to person, place, and time  She appears well-developed and well-nourished  Cardiovascular: Normal rate, regular rhythm and normal heart sounds  Pulmonary/Chest: Effort normal and breath sounds normal    Abdominal: Soft   Bowel sounds are normal    Neurological: She is alert and oriented to person, place, and time  Vitals reviewed  SSE:  Cervix appeared visually closed  KOH/wet mount negative for clue cells, trichomonads, hyphae  Lactobacilli present  No pooling with and without valsalva  Nitrazine negative  No arborization on dry slide    SVE:  Closed/thick/high    FHT:  Baseline Rate: 135 bpm  Variability: Moderate 6-25 bpm  Accelerations: 15 x 15 or greater  Decelerations: None  FHR Category: Category I  TOCO:   Contraction Frequency (minutes): occasional   Contraction Duration (seconds): 50-80  Contraction Quality: Mild    Labs: No results found for this or any previous visit (from the past 24 hour(s))  Lab, Imaging and other studies: I have personally reviewed pertinent reports          Parish Norris MD  9/12/2018  9:10 PM

## 2020-05-05 NOTE — TELEPHONE ENCOUNTER
Referral to Dr. Adam Hernandez faxed to his office in [place]-patient notified. Order for True Metrix glucometer, strips and lancets faxed to [facility].

## 2020-05-05 NOTE — TELEPHONE ENCOUNTER
----- Message from Twila Diamond sent at 5/5/2020 10:04 AM CDT -----  Contact: self  Patient want to speak with a nurse regarding scheduling a ER follow up  For tingling and dizziness patient went to ER this morning please call back at [phone]    Case number [id number]

## 2020-05-07 ENCOUNTER — TELEPHONE (OUTPATIENT)
Dept: GASTROENTEROLOGY | Facility: CLINIC | Age: 75
End: 2020-05-07

## 2020-05-07 NOTE — TELEPHONE ENCOUNTER
----- Message from Jet Bradshaw sent at 5/7/2020 11:52 AM CDT -----  Contact: pt  Pt is calling to see if she can schedule an appt(she is having trouble with her stomach again) pls call pt back to schedule an appt   Call Back#[phone]  Thanks

## 2020-05-07 NOTE — TELEPHONE ENCOUNTER
----- Message from Janina Luz sent at 5/7/2020  2:40 PM CDT -----  Contact: pt  Pt states that she missed a call from the nurse so she is returning the call...[phone] (home)

## 2020-05-08 ENCOUNTER — TELEPHONE (OUTPATIENT)
Dept: FAMILY MEDICINE | Facility: CLINIC | Age: 75
End: 2020-05-08

## 2020-05-08 DIAGNOSIS — Z12.31 VISIT FOR SCREENING MAMMOGRAM: Primary | ICD-10-CM

## 2020-05-08 NOTE — TELEPHONE ENCOUNTER
Nausea very well may be due to the Augmentin she was taking for the urinary tract infection.  I would recommend getting some culturelle probiotics and taking 1 to 2 a day.  She should have finished the antibiotic by now.

## 2020-05-08 NOTE — TELEPHONE ENCOUNTER
----- Message from Ofe Samano sent at 5/8/2020  7:56 AM CDT -----  Type:  Same Day Appointment Request    Caller is requesting a same day appointment.     Name of Caller:  Erica Masterson  When is the first available appointment? NA  Symptoms:  Discomfort in upper part of abdomen with nausea (mostly at night)weakness  Best Call Back Number:  [phone] or [phone] (cellphone)  Additional Information:

## 2020-05-08 NOTE — TELEPHONE ENCOUNTER
----- Message from Shannan Leroy sent at 5/8/2020 11:56 AM CDT -----  Contact: self  Patient just rec'd her letter for her annual mammo due on 5/8/2020.  Call patient back after placing orders so she can get it scheduled at [phone] (home).  Thanks

## 2020-05-08 NOTE — TELEPHONE ENCOUNTER
Patient called for appt. Made 5/15 for er f/u. She is having nausea and wants medication for it. Feels like she could vomit but has not so far.

## 2020-05-11 ENCOUNTER — TELEPHONE (OUTPATIENT)
Dept: OPHTHALMOLOGY | Facility: CLINIC | Age: 75
End: 2020-05-11

## 2020-05-11 PROCEDURE — 99285 EMERGENCY DEPT VISIT HI MDM: CPT | Mod: 25

## 2020-05-11 NOTE — TELEPHONE ENCOUNTER
----- Message from Latisha Xavier sent at 5/11/2020 12:00 PM CDT -----  Contact: Patient  Name of Caller : Patient   Reason for Visit/Symptoms: Discomfort/pressure in eye  Best Contact Number or Confirm if Mychart Preferred: [phone] or    Preferred Date/Time of Appointment: ASAP  Interested in Virtual Visit (yes/no) no  Additional Information: Calling to find out if she can get a sooner appointment than what she is scheduled for her symptoms.

## 2020-05-12 ENCOUNTER — HOSPITAL ENCOUNTER (OUTPATIENT)
Facility: HOSPITAL | Age: 75
Discharge: HOME | End: 2020-05-12
Attending: EMERGENCY MEDICINE | Admitting: INTERNAL MEDICINE
Payer: MEDICARE

## 2020-05-12 ENCOUNTER — TELEPHONE (OUTPATIENT)
Dept: OTOLARYNGOLOGY | Facility: CLINIC | Age: 75
End: 2020-05-12

## 2020-05-12 ENCOUNTER — DOCUMENTATION ONLY (OUTPATIENT)
Dept: FAMILY MEDICINE | Facility: CLINIC | Age: 75
End: 2020-05-12

## 2020-05-12 VITALS
OXYGEN SATURATION: 100 % | RESPIRATION RATE: 18 BRPM | HEIGHT: 66 IN | WEIGHT: 161.81 LBS | BODY MASS INDEX: 26.01 KG/M2 | DIASTOLIC BLOOD PRESSURE: 74 MMHG | HEART RATE: 78 BPM | SYSTOLIC BLOOD PRESSURE: 142 MMHG | TEMPERATURE: 98 F

## 2020-05-12 DIAGNOSIS — R07.9 CHEST PAIN: ICD-10-CM

## 2020-05-12 DIAGNOSIS — F41.9 ANXIETY: ICD-10-CM

## 2020-05-12 DIAGNOSIS — N17.9 AKI (ACUTE KIDNEY INJURY): Primary | ICD-10-CM

## 2020-05-12 PROBLEM — K59.00 CONSTIPATION: Status: ACTIVE | Noted: 2020-05-12

## 2020-05-12 PROBLEM — Z79.4 TYPE 2 DIABETES MELLITUS, WITH LONG-TERM CURRENT USE OF INSULIN: Status: ACTIVE | Noted: 2020-03-24

## 2020-05-12 PROBLEM — D64.9 ANEMIA: Status: ACTIVE | Noted: 2020-05-12

## 2020-05-12 LAB
ALBUMIN SERPL BCP-MCNC: 3.6 G/DL (ref 3.5–5.2)
ALP SERPL-CCNC: 51 U/L (ref 55–135)
ALT SERPL W/O P-5'-P-CCNC: 17 U/L (ref 10–44)
ANION GAP SERPL CALC-SCNC: 7 MMOL/L (ref 8–16)
ANION GAP SERPL CALC-SCNC: 8 MMOL/L (ref 8–16)
AST SERPL-CCNC: 17 U/L (ref 10–40)
BASOPHILS # BLD AUTO: 0.04 K/UL (ref 0–0.2)
BASOPHILS NFR BLD: 0.6 % (ref 0–1.9)
BILIRUB SERPL-MCNC: 0.4 MG/DL (ref 0.1–1)
BUN SERPL-MCNC: 14 MG/DL (ref 8–23)
BUN SERPL-MCNC: 20 MG/DL (ref 8–23)
CALCIUM SERPL-MCNC: 8.9 MG/DL (ref 8.7–10.5)
CALCIUM SERPL-MCNC: 9.1 MG/DL (ref 8.7–10.5)
CHLORIDE SERPL-SCNC: 106 MMOL/L (ref 95–110)
CHLORIDE SERPL-SCNC: 106 MMOL/L (ref 95–110)
CO2 SERPL-SCNC: 25 MMOL/L (ref 23–29)
CO2 SERPL-SCNC: 27 MMOL/L (ref 23–29)
CREAT SERPL-MCNC: 0.9 MG/DL (ref 0.5–1.4)
CREAT SERPL-MCNC: 1.5 MG/DL (ref 0.5–1.4)
DIFFERENTIAL METHOD: ABNORMAL
EOSINOPHIL # BLD AUTO: 0.2 K/UL (ref 0–0.5)
EOSINOPHIL NFR BLD: 3.4 % (ref 0–8)
ERYTHROCYTE [DISTWIDTH] IN BLOOD BY AUTOMATED COUNT: 12.8 % (ref 11.5–14.5)
EST. GFR  (AFRICAN AMERICAN): 39 ML/MIN/1.73 M^2
EST. GFR  (AFRICAN AMERICAN): >60 ML/MIN/1.73 M^2
EST. GFR  (NON AFRICAN AMERICAN): 34 ML/MIN/1.73 M^2
EST. GFR  (NON AFRICAN AMERICAN): >60 ML/MIN/1.73 M^2
GLUCOSE SERPL-MCNC: 107 MG/DL (ref 70–110)
GLUCOSE SERPL-MCNC: 85 MG/DL (ref 70–110)
HCT VFR BLD AUTO: 36.3 % (ref 37–48.5)
HGB BLD-MCNC: 11.4 G/DL (ref 12–16)
IMM GRANULOCYTES # BLD AUTO: 0.01 K/UL (ref 0–0.04)
IMM GRANULOCYTES NFR BLD AUTO: 0.2 % (ref 0–0.5)
IRON SERPL-MCNC: 50 UG/DL (ref 30–160)
LIPASE SERPL-CCNC: 8 U/L (ref 4–60)
LYMPHOCYTES # BLD AUTO: 2.5 K/UL (ref 1–4.8)
LYMPHOCYTES NFR BLD: 39.6 % (ref 18–48)
MAGNESIUM SERPL-MCNC: 2 MG/DL (ref 1.6–2.6)
MCH RBC QN AUTO: 29.4 PG (ref 27–31)
MCHC RBC AUTO-ENTMCNC: 31.4 G/DL (ref 32–36)
MCV RBC AUTO: 94 FL (ref 82–98)
MONOCYTES # BLD AUTO: 0.5 K/UL (ref 0.3–1)
MONOCYTES NFR BLD: 8 % (ref 4–15)
NEUTROPHILS # BLD AUTO: 3.1 K/UL (ref 1.8–7.7)
NEUTROPHILS NFR BLD: 48.2 % (ref 38–73)
NRBC BLD-RTO: 0 /100 WBC
PHOSPHATE SERPL-MCNC: 3.5 MG/DL (ref 2.7–4.5)
PLATELET # BLD AUTO: 311 K/UL (ref 150–350)
PMV BLD AUTO: 9 FL (ref 9.2–12.9)
POCT GLUCOSE: 88 MG/DL (ref 70–110)
POCT GLUCOSE: 90 MG/DL (ref 70–110)
POTASSIUM SERPL-SCNC: 3.6 MMOL/L (ref 3.5–5.1)
POTASSIUM SERPL-SCNC: 4.4 MMOL/L (ref 3.5–5.1)
PROT SERPL-MCNC: 7 G/DL (ref 6–8.4)
RBC # BLD AUTO: 3.88 M/UL (ref 4–5.4)
SARS-COV-2 RDRP RESP QL NAA+PROBE: NEGATIVE
SATURATED IRON: 17 % (ref 20–50)
SODIUM SERPL-SCNC: 139 MMOL/L (ref 136–145)
SODIUM SERPL-SCNC: 140 MMOL/L (ref 136–145)
TOTAL IRON BINDING CAPACITY: 286 UG/DL (ref 250–450)
TRANSFERRIN SERPL-MCNC: 193 MG/DL (ref 200–375)
WBC # BLD AUTO: 6.39 K/UL (ref 3.9–12.7)

## 2020-05-12 PROCEDURE — 80053 COMPREHEN METABOLIC PANEL: CPT

## 2020-05-12 PROCEDURE — 25000003 PHARM REV CODE 250: Performed by: INTERNAL MEDICINE

## 2020-05-12 PROCEDURE — 83540 ASSAY OF IRON: CPT

## 2020-05-12 PROCEDURE — G0378 HOSPITAL OBSERVATION PER HR: HCPCS

## 2020-05-12 PROCEDURE — 99235 HOSP IP/OBS SAME DATE MOD 70: CPT | Mod: ,,, | Performed by: INTERNAL MEDICINE

## 2020-05-12 PROCEDURE — 25000003 PHARM REV CODE 250: Performed by: EMERGENCY MEDICINE

## 2020-05-12 PROCEDURE — 36415 COLL VENOUS BLD VENIPUNCTURE: CPT

## 2020-05-12 PROCEDURE — 80048 BASIC METABOLIC PNL TOTAL CA: CPT

## 2020-05-12 PROCEDURE — 83690 ASSAY OF LIPASE: CPT

## 2020-05-12 PROCEDURE — U0002 COVID-19 LAB TEST NON-CDC: HCPCS

## 2020-05-12 PROCEDURE — 99235 PR OBSERV/HOSP SAME DATE,LEVL IV: ICD-10-PCS | Mod: ,,, | Performed by: INTERNAL MEDICINE

## 2020-05-12 PROCEDURE — 84100 ASSAY OF PHOSPHORUS: CPT

## 2020-05-12 PROCEDURE — 85025 COMPLETE CBC W/AUTO DIFF WBC: CPT

## 2020-05-12 PROCEDURE — 83735 ASSAY OF MAGNESIUM: CPT

## 2020-05-12 PROCEDURE — 25000003 PHARM REV CODE 250: Performed by: NURSE PRACTITIONER

## 2020-05-12 RX ORDER — POLYETHYLENE GLYCOL 3350 17 G/17G
17 POWDER, FOR SOLUTION ORAL DAILY
Status: DISCONTINUED | OUTPATIENT
Start: 2020-05-12 | End: 2020-05-12 | Stop reason: HOSPADM

## 2020-05-12 RX ORDER — ASPIRIN 81 MG/1
81 TABLET ORAL DAILY
Status: DISCONTINUED | OUTPATIENT
Start: 2020-05-12 | End: 2020-05-12 | Stop reason: HOSPADM

## 2020-05-12 RX ORDER — IBUPROFEN 200 MG
24 TABLET ORAL
Status: DISCONTINUED | OUTPATIENT
Start: 2020-05-12 | End: 2020-05-12 | Stop reason: HOSPADM

## 2020-05-12 RX ORDER — AMOXICILLIN 250 MG
1 CAPSULE ORAL DAILY PRN
Status: DISCONTINUED | OUTPATIENT
Start: 2020-05-12 | End: 2020-05-12 | Stop reason: HOSPADM

## 2020-05-12 RX ORDER — MAG HYDROX/ALUMINUM HYD/SIMETH 200-200-20
15 SUSPENSION, ORAL (FINAL DOSE FORM) ORAL
Status: COMPLETED | OUTPATIENT
Start: 2020-05-12 | End: 2020-05-12

## 2020-05-12 RX ORDER — LORAZEPAM 1 MG/1
1 TABLET ORAL
Status: COMPLETED | OUTPATIENT
Start: 2020-05-12 | End: 2020-05-12

## 2020-05-12 RX ORDER — ACETAMINOPHEN 325 MG/1
650 TABLET ORAL EVERY 6 HOURS PRN
Status: DISCONTINUED | OUTPATIENT
Start: 2020-05-12 | End: 2020-05-12 | Stop reason: HOSPADM

## 2020-05-12 RX ORDER — LANOLIN ALCOHOL/MO/W.PET/CERES
800 CREAM (GRAM) TOPICAL
Status: DISCONTINUED | OUTPATIENT
Start: 2020-05-12 | End: 2020-05-12 | Stop reason: HOSPADM

## 2020-05-12 RX ORDER — IBUPROFEN 200 MG
16 TABLET ORAL
Status: DISCONTINUED | OUTPATIENT
Start: 2020-05-12 | End: 2020-05-12 | Stop reason: HOSPADM

## 2020-05-12 RX ORDER — ALPRAZOLAM 0.25 MG/1
0.25 TABLET ORAL 2 TIMES DAILY PRN
Status: DISCONTINUED | OUTPATIENT
Start: 2020-05-12 | End: 2020-05-12 | Stop reason: HOSPADM

## 2020-05-12 RX ORDER — LIDOCAINE HYDROCHLORIDE 20 MG/ML
5 SOLUTION OROPHARYNGEAL
Status: COMPLETED | OUTPATIENT
Start: 2020-05-12 | End: 2020-05-12

## 2020-05-12 RX ORDER — CARBOXYMETHYLCELLULOSE SODIUM 10 MG/ML
1 GEL OPHTHALMIC 3 TIMES DAILY PRN
Status: DISCONTINUED | OUTPATIENT
Start: 2020-05-12 | End: 2020-05-12 | Stop reason: HOSPADM

## 2020-05-12 RX ORDER — PANTOPRAZOLE SODIUM 40 MG/1
40 TABLET, DELAYED RELEASE ORAL DAILY
Status: DISCONTINUED | OUTPATIENT
Start: 2020-05-12 | End: 2020-05-12 | Stop reason: HOSPADM

## 2020-05-12 RX ORDER — GABAPENTIN 100 MG/1
100 CAPSULE ORAL NIGHTLY
Status: DISCONTINUED | OUTPATIENT
Start: 2020-05-12 | End: 2020-05-12 | Stop reason: HOSPADM

## 2020-05-12 RX ORDER — ONDANSETRON 2 MG/ML
4 INJECTION INTRAMUSCULAR; INTRAVENOUS EVERY 8 HOURS PRN
Status: DISCONTINUED | OUTPATIENT
Start: 2020-05-12 | End: 2020-05-12 | Stop reason: HOSPADM

## 2020-05-12 RX ORDER — HYDRALAZINE HYDROCHLORIDE 20 MG/ML
10 INJECTION INTRAMUSCULAR; INTRAVENOUS EVERY 8 HOURS PRN
Status: DISCONTINUED | OUTPATIENT
Start: 2020-05-12 | End: 2020-05-12 | Stop reason: HOSPADM

## 2020-05-12 RX ORDER — POTASSIUM CHLORIDE 20 MEQ/15ML
60 SOLUTION ORAL
Status: DISCONTINUED | OUTPATIENT
Start: 2020-05-12 | End: 2020-05-12 | Stop reason: HOSPADM

## 2020-05-12 RX ORDER — GLUCAGON 1 MG
1 KIT INJECTION
Status: DISCONTINUED | OUTPATIENT
Start: 2020-05-12 | End: 2020-05-12 | Stop reason: HOSPADM

## 2020-05-12 RX ORDER — SODIUM CHLORIDE 9 MG/ML
INJECTION, SOLUTION INTRAVENOUS CONTINUOUS
Status: DISCONTINUED | OUTPATIENT
Start: 2020-05-12 | End: 2020-05-12 | Stop reason: HOSPADM

## 2020-05-12 RX ORDER — INSULIN ASPART 100 [IU]/ML
0-5 INJECTION, SOLUTION INTRAVENOUS; SUBCUTANEOUS
Status: DISCONTINUED | OUTPATIENT
Start: 2020-05-12 | End: 2020-05-12 | Stop reason: HOSPADM

## 2020-05-12 RX ORDER — POTASSIUM CHLORIDE 20 MEQ/15ML
40 SOLUTION ORAL
Status: DISCONTINUED | OUTPATIENT
Start: 2020-05-12 | End: 2020-05-12 | Stop reason: HOSPADM

## 2020-05-12 RX ORDER — TALC
6 POWDER (GRAM) TOPICAL NIGHTLY PRN
Status: DISCONTINUED | OUTPATIENT
Start: 2020-05-12 | End: 2020-05-12 | Stop reason: HOSPADM

## 2020-05-12 RX ORDER — SODIUM CHLORIDE 0.9 % (FLUSH) 0.9 %
10 SYRINGE (ML) INJECTION
Status: DISCONTINUED | OUTPATIENT
Start: 2020-05-12 | End: 2020-05-12 | Stop reason: HOSPADM

## 2020-05-12 RX ORDER — LOSARTAN POTASSIUM 25 MG/1
100 TABLET ORAL DAILY
Status: DISCONTINUED | OUTPATIENT
Start: 2020-05-12 | End: 2020-05-12 | Stop reason: HOSPADM

## 2020-05-12 RX ADMIN — ASPIRIN 81 MG: 81 TABLET, COATED ORAL at 08:05

## 2020-05-12 RX ADMIN — ALUMINUM HYDROXIDE, MAGNESIUM HYDROXIDE, AND SIMETHICONE 15 ML: 200; 200; 20 SUSPENSION ORAL at 12:05

## 2020-05-12 RX ADMIN — PANTOPRAZOLE SODIUM 40 MG: 40 TABLET, DELAYED RELEASE ORAL at 08:05

## 2020-05-12 RX ADMIN — SODIUM CHLORIDE 1000 ML: 0.9 INJECTION, SOLUTION INTRAVENOUS at 01:05

## 2020-05-12 RX ADMIN — LORAZEPAM 1 MG: 1 TABLET ORAL at 01:05

## 2020-05-12 RX ADMIN — POLYETHYLENE GLYCOL (3350) 17 G: 17 POWDER, FOR SOLUTION ORAL at 08:05

## 2020-05-12 RX ADMIN — LIDOCAINE HYDROCHLORIDE 5 ML: 20 SOLUTION ORAL; TOPICAL at 12:05

## 2020-05-12 RX ADMIN — SODIUM CHLORIDE: 0.9 INJECTION, SOLUTION INTRAVENOUS at 05:05

## 2020-05-12 RX ADMIN — LOSARTAN POTASSIUM 100 MG: 25 TABLET, FILM COATED ORAL at 08:05

## 2020-05-12 NOTE — ED NOTES
Attempted to call report to PCU. Nurse unable to take report at this time and will call back when available.

## 2020-05-12 NOTE — ASSESSMENT & PLAN NOTE
Chronic. Not currently on PPI.  Symptoms may be related to GERD.  Will start PPI.  Patient has GI follow-up on Thursday.

## 2020-05-12 NOTE — PROGRESS NOTES
Pre-Visit Chart Review  For Appointment Scheduled on 5-15-20    Health Maintenance Due   Topic Date Due    DEXA SCAN  03/29/2020

## 2020-05-12 NOTE — PLAN OF CARE
05/12/20 1120   ORDOÑEZ Message   Medicare Outpatient and Observation Notification regarding financial responsibility Given to patient/caregiver;Explained to patient/caregiver;Signed/date by patient/caregiver;Other (comments)  (telephone consent from pt. CM not going into rooms due to COVID precautions. copy will be faxed to nursing station for pt to receive copy.)   Date ORDOÑEZ was signed 05/12/20   Time ORDOÑEZ was signed 1120       CM faxed copy to nurse's station ([phone]). Faxed confirmation received at 1153. CM notified charge nurse.

## 2020-05-12 NOTE — PLAN OF CARE
SW spoke with patient via telephone to complete discharge planning assessment.  Patient alert and oriented xs 4.  Patient verified all demographic information on facesheet is correct.  Patient verified PCP is Dr. Narayan Myers.  Patient verified primary health insurance is [employer].  Patient with NO home health or DME.  Patient with NO POA or Living Will.  Patient not on dialysis or medication coumadin.  Patient with no 30 day admission.  Patient with no financial issues at this time.  Patient family will provide transportation upon discharge from facility.  Patient independent with ADLs, live with spouse Joel Masterson, drives self.  Patient uses [id number] - [place], [place] - [address]       05/12/20 1249   Discharge Assessment   Assessment Type Discharge Planning Assessment   Confirmed/corrected address and phone number on facesheet? Yes   Assessment information obtained from? Patient   Communicated expected length of stay with patient/caregiver yes   Prior to hospitilization cognitive status: Alert/Oriented   Prior to hospitalization functional status: Independent   Current cognitive status: Alert/Oriented   Current Functional Status: Independent   Lives With spouse  (Joel Masterson)   Able to Return to Prior Arrangements yes   Is patient able to care for self after discharge? Yes   Patient's perception of discharge disposition home or selfcare   Readmission Within the Last 30 Days no previous admission in last 30 days   Patient currently being followed by outpatient case management? No   Patient currently receives any other outside agency services? No   Equipment Currently Used at Home none   Do you have any problems affording any of your prescribed medications? No   Does the patient have transportation home? Yes   Transportation Anticipated family or friend will provide   Does the patient receive services at the Coumadin Clinic? No   Discharge Plan A Home   Discharge Plan B Home with  family   DME Needed Upon Discharge  none   Patient/Family in Agreement with Plan yes

## 2020-05-12 NOTE — ASSESSMENT & PLAN NOTE
Patient with multiple ED visits for vague symptoms. Noted to be very anxious, given short-term prescription for benzos. Continue low-dose Xanax for now.  Patient would benefit from outpatient follow-up.

## 2020-05-12 NOTE — HPI
Erica Masterson is a 74 y.o. female with a PMHx of CKD 3, GERD, hyperlipidemia, hypertension, type 2 diabetes, DDD, tuberculosis, and diverticulosis who presents to the ED with multiple nonspecific complaints.  Upon speaking to her chief complaint is a sensation of difficulty swallowing when lying down at night.  She reports it has been an ongoing issue for over a month.  She describes the sensation as cramping, tight from her stomach up to her throat and also through to her back. Her pain has resolved while in the ED after receiving GI cocktail.  She denies any difficulty eating or keeping down p.o. intake.  She denies any nausea, vomiting, or drooling. She does endorse constipation and reports her last BM was 2 days ago. Patient reports having an appointment with Dr. Bentley and Thursday as well as her neurologist.  She denies any abdominal pain, diarrhea, fever/chills, cough, chest pain, or shortness of breath.  Her ED workup patient significant for JEFF and chronic anemia on labs.  Patient will be placed in observation under Hospital Medicine for further workup and evaluation.

## 2020-05-12 NOTE — DISCHARGE SUMMARY
[facility] Hospital Medicine  Discharge Summary      Patient Name: Erica Masterson  MRN: [id number]  Admission Date: 5/12/2020  Hospital Length of Stay: 0 days  Discharge Date and Time:  05/12/2020 12:40 PM  Attending Physician: Bulmaro Justin MD   Discharging Provider: Bulmaro Justin MD  Primary Care Provider: Narayan Myers MD      HPI:   Erica Masterson is a 74 y.o. female with a PMHx of CKD 3, GERD, hyperlipidemia, hypertension, type 2 diabetes, DDD, tuberculosis, and diverticulosis who presents to the ED with multiple nonspecific complaints.  Upon speaking to her chief complaint is a sensation of difficulty swallowing when lying down at night.  She reports it has been an ongoing issue for over a month.  She describes the sensation as cramping, tight from her stomach up to her throat and also through to her back. Her pain has resolved while in the ED after receiving GI cocktail.  She denies any difficulty eating or keeping down p.o. intake.  She denies any nausea, vomiting, or drooling. She does endorse constipation and reports her last BM was 2 days ago. Patient reports having an appointment with Dr. Bentley and Thursday as well as her neurologist.  She denies any abdominal pain, diarrhea, fever/chills, cough, chest pain, or shortness of breath.  Her ED workup patient significant for JEFF and chronic anemia on labs.  Patient will be placed in observation under Hospital Medicine for further workup and evaluation.    * No surgery found *      Hospital Course:   Patient presented with multiple chronic vague complaints including a feeling of dysphagia that occurs mainly at night and intermittent abdominal pain.  She has outpatient follow-up scheduled already with ENT and Gastroenterology.    She has multiple vague chronic complaints which are not consistent with any acute pathology that requires hospitalization. This can be worked up as an outpatient, consider treatment of her  "anxiety.    Patient seen and examined the day of discharge.  She is concerned about going to an eye appointment later.  She has follow-up scheduled.  Her abdominal pain feels like a muscle" that goes from her lower quadrant into her neck.  She has bilateral aching neck pain.  She has a BMP pending to monitor her renal function prior to discharge.    Consults:   Consults (From admission, onward)        Status Ordering Provider     Inpatient consult to \[facility] [place] [facility]\"" Care  Once     Provider:  (Not yet assigned)    Acknowledged JESSICA LUNDBERG          Constipation  Chronic issue.   Start MiraLax daily.    KUB ordered.    Anemia  Patient's anemia is currently controlled. S/p 0 units of PRBCs.   Current CBC reviewed-   Lab Results   Component Value Date    HGB 11.4 (L) 05/12/2020    HCT 36.3 (L) 05/12/2020     Monitor serial CBC and transfuse if patient becomes hemodynamically unstable, symptomatic or H/H drops below 7/21.     Type 2 diabetes mellitus, without long-term current use of insulin  Patient's FSGs are controlled on current hypoglycemics.   Last A1c reviewed-   Lab Results   Component Value Date    HGBA1C 6.5 (H) 03/23/2020     Most recent fingerstick glucose reviewed- No results for input(s): POCTGLUCOSE in the last 24 hours.  Current correctional scale  Low  Maintain anti-hyperglycemic dose as follows-   Antihyperglycemics (From admission, onward)    Start     Stop Route Frequency Ordered    05/12/20 0435  insulin aspart U-100 pen 0-5 Units      -- SubQ Before meals & nightly PRN 05/12/20 0435        Low-dose sliding scale.    Accu-Cheks a.c./HS  Diabetic diet.    GERD (gastroesophageal reflux disease)  Chronic. Not currently on PPI.  Symptoms may be related to GERD.  Will start PPI.  Patient has GI follow-up on Thursday.    Anxiety  Patient with multiple ED visits for vague symptoms. Noted to be very anxious, given short-term prescription for benzos. Continue low-dose Xanax for now.  Patient would benefit " from outpatient follow-up.      Final Active Diagnoses:    Diagnosis Date Noted POA    Anemia [D64.9] 05/12/2020 Yes    Constipation [K59.00] 05/12/2020 Yes    Type 2 diabetes mellitus, without long-term current use of insulin [E11.9] 03/24/2020 Yes    GERD (gastroesophageal reflux disease) [K21.9] 05/28/2015 Yes    Anxiety [F41.9] 11/04/2014 Yes    Hypertension associated with diabetes [E11.59, I10]  Yes      Problems Resolved During this Admission:    Diagnosis Date Noted Date Resolved POA    PRINCIPAL PROBLEM:  Acute renal failure superimposed on stage 3 chronic kidney disease [N17.9, N18.3] 06/30/2016 05/12/2020 Yes       Discharged Condition: good    Disposition: Home or Self Care    Follow Up:  Follow-up Information     Narayan Myers MD In 2 days.    Specialty:  Family Medicine  Contact information:  [address]  [place] [address]  [phone]                 Patient Instructions:      Notify your health care provider if you experience any of the following:  temperature >100.4     Notify your health care provider if you experience any of the following:  persistent nausea and vomiting or diarrhea     Notify your health care provider if you experience any of the following:     Notify your health care provider if you experience any of the following:  increased confusion or weakness     Notify your health care provider if you experience any of the following:  persistent dizziness, light-headedness, or visual disturbances     Notify your health care provider if you experience any of the following:  worsening rash     Notify your health care provider if you experience any of the following:  severe persistent headache     Notify your health care provider if you experience any of the following:  difficulty breathing or increased cough     Notify your health care provider if you experience any of the following:  redness, tenderness, or signs of infection (pain, swelling, redness, odor or green/yellow  discharge around incision site)     Notify your health care provider if you experience any of the following:  severe uncontrolled pain     Activity as tolerated       Significant Diagnostic Studies: Labs: All labs within the past 24 hours have been reviewed    Pending Diagnostic Studies:     Procedure Component Value Units Date/Time    Basic metabolic panel [[id number]] Collected:  05/12/20 1138    Order Status:  Sent Lab Status:  In process Updated:  05/12/20 1202    Specimen:  Blood          Medications:  Reconciled Home Medications:      Medication List      CHANGE how you take these medications    metFORMIN 500 MG XR 24hr tablet  Commonly known as:  GLUCOPHAGE-XR  Take 1 tablet (500 mg total) by mouth once daily. Take one every morning for one week then increase to one twice a day thereafter  What changed:  additional instructions        CONTINUE taking these medications    ALPRAZolam 0.25 MG tablet  Commonly known as:  XANAX  Take 1 tablet (0.25 mg total) by mouth 3 (three) times daily as needed for Anxiety.     aspirin 81 MG EC tablet  Commonly known as:  ECOTRIN  Take 162 mg by mouth once daily.     blood sugar diagnostic Strp  Commonly known as:  BLOOD GLUCOSE TEST  True Metrix glucose strips check once daily     blood-glucose meter kit  True Metrix glucometer check glucose once daily     carboxymethylcellulose sodium 0.25 % Drop  Place 1 drop into both eyes every evening.     gabapentin 100 MG capsule  Commonly known as:  NEURONTIN  Take 1 capsule (100 mg total) by mouth nightly as needed.     lancets Misc  True Metrix lancets check once daily     losartan 100 MG tablet  Commonly known as:  COZAAR  Take 1 tablet (100 mg total) by mouth once daily.     magnesium 250 mg Tab  Take 1 tablet by mouth once daily.            Indwelling Lines/Drains at time of discharge:   Lines/Drains/Airways     None                 Time spent on the discharge of patient: 35 minutes  Patient was seen and examined on the date of  discharge and determined to be suitable for discharge.         Bulmaro Justin MD  Department of Hospital Medicine  [facility]

## 2020-05-12 NOTE — NURSING
Discharge instructions explained to patient, verbalized understanding.  PIV removed, tolerated well, catheter intact. Telemetry monitor removed, returned to monitor unit. Vital Signs stable,denies complaints. Patient off unit via WC.

## 2020-05-12 NOTE — ASSESSMENT & PLAN NOTE
Patient's FSGs are controlled on current hypoglycemics.   Last A1c reviewed-   Lab Results   Component Value Date    HGBA1C 6.5 (H) 03/23/2020     Most recent fingerstick glucose reviewed- No results for input(s): POCTGLUCOSE in the last 24 hours.  Current correctional scale  Low  Maintain anti-hyperglycemic dose as follows-   Antihyperglycemics (From admission, onward)    Start     Stop Route Frequency Ordered    05/12/20 0435  insulin aspart U-100 pen 0-5 Units      -- SubQ Before meals & nightly PRN 05/12/20 0435        Low-dose sliding scale.    Accu-Cheks a.c./HS  Diabetic diet.

## 2020-05-12 NOTE — ASSESSMENT & PLAN NOTE
Chronic, controlled.  Latest blood pressure and vitals reviewed-   Temp:  [98.1 °F (36.7 °C)]   Pulse:  [66-92]   Resp:  [18]   BP: (156-197)/(69-88)   SpO2:  [100 %] .   Home meds for hypertension were reviewed and noted below. Hospital anti-hypertensive changes were made as shown below.  Hypertension Medications             losartan (COZAAR) 100 MG tablet Take 1 tablet (100 mg total) by mouth once daily.        Will utilize p.r.n. blood pressure medication only if patient's blood pressure greater than  180/110 and she develops symptoms such as worsening chest pain or shortness of breath.

## 2020-05-12 NOTE — PLAN OF CARE
05/12/20 1514   Final Note   Assessment Type Final Discharge Note   Anticipated Discharge Disposition Home   Hospital Follow Up  Appt(s) scheduled? Yes

## 2020-05-12 NOTE — ED PROVIDER NOTES
"Encounter Date: 5/11/2020    SCRIBE #1 NOTE: IGiovanna, am scribing for, and in the presence of, Dr. Castrejon.       History     Chief Complaint   Patient presents with    Choking     feeling for months when lying down     5/12/2020  12:11 AM     The patient is a 74 y.o. female  who presents with hard to swallow. Patient reports the sensation of "hard to swallow" when lying down at night. This has been an ongoing problem for the past month. Pt describes the sensation as a tight, cramping muscle radiating from her stomach up to her throat and also through the back region. She denies any difficulty eating or swallowing. No drooling. The patient has an appointment with Dr. Britton in 2 days and an appointment with her neurologist for neuropathy this week as well. She is scheduled to see an ENT next month. She denies any abdominal pain, nausea, vomiting, diarrhea, fever, cough, sob or chest pain. No other complaints at this time. Pt has a hx of tuberculosis 50 years ago which resulted in right middle lobectomy. PMHx of GERD, myopathy, HTN, type II DM, DDD, HLD, tuberculosis, diverticulosis and colon polyp. SHx of hysterectomy, oophorectomy, colonoscopy x 2 and left breast biopsy.    The history is provided by the patient.     Review of patient's allergies indicates:   Allergen Reactions    No known drug allergies      Past Medical History:   Diagnosis Date    Allergy     Cataract     Colon polyp     Degenerative arthritis of knee 4/3/2012    Depression 3/26/2012    Diverticulosis     GERD (gastroesophageal reflux disease)     Hyperlipidemia     Hypertension     Multinodular goiter 3/26/2012    Myopathy, unspecified 1/18/2010    New onset type 2 diabetes mellitus 3/24/2020    New onset type 2 diabetes mellitus 3/24/2020    Tuberculosis      Past Surgical History:   Procedure Laterality Date    BREAST BIOPSY Left 2015    benign    COLONOSCOPY  12/2/15    Dr. Britton, multiple polyps, recheck five " years-three years if more than 2 polyps are adenomatous    COLONOSCOPY N/A 12/2/2015    COLONOSCOPY N/A 3/20/2019    Procedure: COLONOSCOPY;  Surgeon: Jose Britton MD;  Location[facility];  Service: Endoscopy;  Laterality: N/A;    FOOT SURGERY      HYSTERECTOMY      KNEE SURGERY  06/06/2013    right knee tear Dr Iverson     LUNG LOBECTOMY  1966    right middle lobectomy, due to Tb    OOPHORECTOMY      SHOULDER SURGERY      francis      Family History   Problem Relation Age of Onset    Colon cancer Other     Cancer Other     Cancer Mother     Hypertension Mother     Cancer Brother     Hypertension Father     Diabetes Son     Hypertension Son     Alcohol abuse Son     Diabetes Maternal Aunt     Alzheimer's disease Maternal Uncle     Cancer Maternal Grandmother     No Known Problems Daughter     Dementia Sister     Alzheimer's disease Sister     Breast cancer Sister 60    Obesity Paternal Uncle     Melanoma Neg Hx     Psoriasis Neg Hx     Lupus Neg Hx     Eczema Neg Hx     Amblyopia Neg Hx     Blindness Neg Hx     Cataracts Neg Hx     Glaucoma Neg Hx     Macular degeneration Neg Hx     Retinal detachment Neg Hx     Strabismus Neg Hx     Stroke Neg Hx     Thyroid disease Neg Hx      Social History     Tobacco Use    Smoking status: Former Smoker    Smokeless tobacco: Never Used    Tobacco comment: quit in 1963   Substance Use Topics    Alcohol use: Yes     Comment: Occasionally    Drug use: No     Review of Systems   Constitutional: Negative for appetite change, chills and fever.   HENT: Negative for congestion, drooling, rhinorrhea and sore throat.         + Hard to swallow when lying down   Respiratory: Negative for cough and shortness of breath.    Cardiovascular: Negative for chest pain.   Gastrointestinal: Negative for abdominal pain, diarrhea, nausea and vomiting.   Genitourinary: Negative for dysuria.   Musculoskeletal: Negative for back pain and myalgias.   Skin:  Negative for rash.   Neurological: Negative for weakness and numbness.   Hematological: Does not bruise/bleed easily.       Physical Exam     Initial Vitals [05/12/20 0001]   BP Pulse Resp Temp SpO2   (!) 186/82 92 18 98.1 °F (36.7 °C) 100 %      MAP       --         Physical Exam    Nursing note and vitals reviewed.  Constitutional: No distress.   Elderly-appearing.   HENT:   Head: Normocephalic and atraumatic.   Mouth/Throat: Mucous membranes are normal.   Eyes: EOM are normal. Pupils are equal, round, and reactive to light.   Neck: Normal range of motion.   Cardiovascular: Normal rate, regular rhythm, normal heart sounds and intact distal pulses. Exam reveals no gallop and no friction rub.    No murmur heard.  Pulmonary/Chest: Breath sounds normal. She has no wheezes. She has no rhonchi. She has no rales.   Abdominal: Soft. She exhibits no distension. There is no tenderness.   Musculoskeletal: Normal range of motion. She exhibits no edema.   Neurological: She is alert and oriented to person, place, and time. She has normal strength. No cranial nerve deficit or sensory deficit. She displays a negative Romberg sign.   Skin: Skin is dry. No rash noted. No erythema.   Psychiatric: She has a normal mood and affect.         ED Course   Procedures  Labs Reviewed   CBC W/ AUTO DIFFERENTIAL - Abnormal; Notable for the following components:       Result Value    RBC 3.88 (*)     Hemoglobin 11.4 (*)     Hematocrit 36.3 (*)     Mean Corpuscular Hemoglobin Conc 31.4 (*)     MPV 9.0 (*)     All other components within normal limits   COMPREHENSIVE METABOLIC PANEL - Abnormal; Notable for the following components:    Creatinine 1.5 (*)     Alkaline Phosphatase 51 (*)     eGFR if  39 (*)     eGFR if non  34 (*)     All other components within normal limits   LIPASE   SARS-COV-2 RNA AMPLIFICATION, QUAL          Imaging Results    None          Medical Decision Making:   History:   Old Medical  "Records: I decided to obtain old medical records.  Clinical Tests:   Radiological Study: Reviewed and Ordered            Scribe Attestation:   Scribe #1: I performed the above scribed service and the documentation accurately describes the services I performed. I attest to the accuracy of the note.      Attending Attestation:     Physician Attestation for Scribe:    I, Dr. Mahesh Castrejon, personally performed the services described in this documentation.   All medical record entries made by the scribe were at my direction and in my presence.   I have reviewed the chart and agree that the record is accurate and complete.   Mahesh Castrejon MD  3:11 AM 05/12/2020     DISCLAIMER: This note was prepared with [employer] voice recognition transcription software. Garbled syntax, mangled pronouns, and other bizarre constructions may be attributed to that software system.          ED Course as of May 12 0311   Tue May 12, 2020   0028 74-year-old female past medical history of diabetes presents with multiple nonspecific complaints including "trouble swallowing" that she reports have been going on for approximately 1 month.  She states that she is able to tolerate liquids and food but has choking sensation at night. Denies any unilateral weakness, speech issues, facial asymmetry blurry vision, or difficulty walking.  Patient denies any history of MS or neuro degenerative disorder.  Patient tolerating secretions.  Nonfocal neurologic exam.  No ptosis.  5/ 5 strength all extremities. Palpable thyroid nodule. Do not suspect stroke, TIA, food bolus, myasthenia or infectious process. Pt had US of nodule and it is stable. Pt also has fu appointment with GI and neurology in two days. Will get labs to make sure no electrolyte abnormalities or anemia are occurring. Pt also reports anxiety may be contributing and felt much improved after ativan.     [BD]   0129 Labs show mild JEFF w Cr of 1.5 from baseline of 1. Will give " IVF and discuss with patient.     [BD]   0211 Joint decision making with patient and she would prefer admission overnight for IVF therefore discussed with Ms. Bustoseman who accepted patient for observation.     [BD]      ED Course User Index  [BD] Mahesh Castrejon MD                Clinical Impression:       ICD-10-CM ICD-9-CM   1. JEFF (acute kidney injury) N17.9 584.9         Disposition:   Disposition: Admitted  Condition: Stable     ED Disposition Condition    Observation                           Mahesh Castrejon MD  05/12/20 3135

## 2020-05-12 NOTE — ED NOTES
Patient able to swallow water with no difficulties.  States that pain in neck is beginning to subside.

## 2020-05-12 NOTE — NURSING
Discharge instructions given to pt. Instructed on medicine regimen and f/u appts. Pt verbalizes understanding. IV and Telemetry removed. Awaiting transport

## 2020-05-12 NOTE — HOSPITAL COURSE
Patient presented with multiple chronic vague complaints including a feeling of dysphagia that occurs mainly at night and intermittent abdominal pain.  She has outpatient follow-up scheduled already with ENT and Gastroenterology.

## 2020-05-12 NOTE — ASSESSMENT & PLAN NOTE
Likely secondary to volume depletion.  Continue IVF hydration.  Follow renal panel and electrolytes closely.  Adjust renal dose medications for creatinine clearance Estimated Creatinine Clearance: 32.4 mL/min (A) (based on SCr of 1.5 mg/dL (H)).   Avoid NSAIDs, Vega-II inhibitors, ACE-I, Angiotensin Receptor Blockers, or Aminoglycosides.  Urinalysis ordered.

## 2020-05-12 NOTE — ASSESSMENT & PLAN NOTE
Patient's FSGs are controlled on current hypoglycemics.   Last A1c reviewed-   Lab Results   Component Value Date    HGBA1C 6.5 (H) 03/23/2020     Most recent fingerstick glucose reviewed- No results for input(s): POCTGLUCOSE in the last 24 hours.  Current correctional scale  Low  Maintain anti-hyperglycemic dose as follows-   Antihyperglycemics (From admission, onward)    None        Low-dose sliding scale.    Accu-Cheks a.c./HS  Diabetic diet.

## 2020-05-12 NOTE — ASSESSMENT & PLAN NOTE
Patient is chronically on statin.will continue for now. Monitor clinically. Last LDL was   Lab Results   Component Value Date    LDLCALC 85.4 08/30/2019

## 2020-05-12 NOTE — TELEPHONE ENCOUNTER
----- Message from Nevaeh Cabrera MA sent at 5/12/2020  9:24 AM CDT -----  Contact: self  Patient requesting to speak to nurse regarding rescheduling appt she has on 05/25 to earlier date if possible       Patient states she is in hospital and will be discharged today and will like to come tomorrow in office , no VV per patient     Patient contact is [phone] patient cell

## 2020-05-12 NOTE — ED NOTES
"Erica Masterson presents to the ED with c/o patient in with c/o trouble swallowing with associated muscle pain from stomach to right side of neck, left side, and right back.  Pt states "feels like the muscle is going to turn over."  No c/o nausea, vomiting, diarrhea, or trouble swallowing while attempting to eat.  Mucous membranes are pink and moist. Skin is warm, dry and intact. Lungs are clear bilaterally, respirations are regular and unlabored. Denies SOB, cough, congestion or rhinorrhea. BS active x4, tenderness with palpation, abd is soft and not distended. Denies any appetite or activity change. S1S2, capillary refill is < 2 seconds. Denies dysuria, difficulty urinating, frequency, numbness, tingling or weakness. HAN VALENCIA    "

## 2020-05-12 NOTE — ASSESSMENT & PLAN NOTE
Patient's anemia is currently controlled. S/p 0 units of PRBCs.   Current CBC reviewed-   Lab Results   Component Value Date    HGB 11.4 (L) 05/12/2020    HCT 36.3 (L) 05/12/2020     Monitor serial CBC and transfuse if patient becomes hemodynamically unstable, symptomatic or H/H drops below 7/21.

## 2020-05-12 NOTE — SUBJECTIVE & OBJECTIVE
Past Medical History:   Diagnosis Date    Allergy     Cataract     Colon polyp     Degenerative arthritis of knee 4/3/2012    Depression 3/26/2012    Diverticulosis     GERD (gastroesophageal reflux disease)     Hyperlipidemia     Hypertension     Multinodular goiter 3/26/2012    Myopathy, unspecified 1/18/2010    New onset type 2 diabetes mellitus 3/24/2020    New onset type 2 diabetes mellitus 3/24/2020    Tuberculosis        Past Surgical History:   Procedure Laterality Date    BREAST BIOPSY Left 2015    benign    COLONOSCOPY  12/2/15    Dr. Britton, multiple polyps, recheck five years-three years if more than 2 polyps are adenomatous    COLONOSCOPY N/A 12/2/2015    COLONOSCOPY N/A 3/20/2019    Procedure: COLONOSCOPY;  Surgeon: Jose Britton MD;  Location: [facility];  Service: Endoscopy;  Laterality: N/A;    FOOT SURGERY      HYSTERECTOMY      KNEE SURGERY  06/06/2013    right knee tear Dr Iverson     LUNG LOBECTOMY  1966    right middle lobectomy, due to Tb    OOPHORECTOMY      SHOULDER SURGERY      francis        Review of patient's allergies indicates:   Allergen Reactions    No known drug allergies        No current facility-administered medications on file prior to encounter.      Current Outpatient Medications on File Prior to Encounter   Medication Sig    ALPRAZolam (XANAX) 0.25 MG tablet Take 1 tablet (0.25 mg total) by mouth 3 (three) times daily as needed for Anxiety. (Patient taking differently: Take 0.125 mg by mouth 3 (three) times daily as needed for Anxiety. Taking 1/2 tab prn)    aspirin (ECOTRIN) 81 MG EC tablet Take 162 mg by mouth once daily.     carboxymethylcellulose sodium 0.25 % Drop Place 1 drop into both eyes once daily.    gabapentin (NEURONTIN) 100 MG capsule Take 1 capsule (100 mg total) by mouth nightly as needed.    losartan (COZAAR) 100 MG tablet Take 1 tablet (100 mg total) by mouth once daily. (Patient taking differently: Take 100 mg by mouth once  daily. Taking 2 of her 50mg tabs to use up then will start this rx.)    magnesium 250 mg Tab Take 1 tablet by mouth once daily.    metFORMIN (GLUCOPHAGE-XR) 500 MG XR 24hr tablet Take 1 tablet (500 mg total) by mouth once daily. Take one every morning for one week then increase to one twice a day thereafter (Patient taking differently: Take 500 mg by mouth once daily. Take one every morning for one week then increase to one twice a day thereafter  Per patient taking 1 qd)    blood sugar diagnostic (BLOOD GLUCOSE TEST) Strp True Metrix glucose strips check once daily    blood-glucose meter kit True Metrix glucometer check glucose once daily    lancets Misc True Metrix lancets check once daily    [DISCONTINUED] amoxicillin-clavulanate 875-125mg (AUGMENTIN) 875-125 mg per tablet Take 1 tablet by mouth 2 (two) times daily.     Family History     Problem Relation (Age of Onset)    Alcohol abuse Son    Alzheimer's disease Maternal Uncle, Sister    Breast cancer Sister (60)    Cancer Other, Mother, Brother, Maternal Grandmother    Colon cancer Other    Dementia Sister    Diabetes Son, Maternal Aunt    Hypertension Mother, Father, Son    No Known Problems Daughter    Obesity Paternal Uncle        Tobacco Use    Smoking status: Former Smoker    Smokeless tobacco: Never Used    Tobacco comment: quit in 1963   Substance and Sexual Activity    Alcohol use: Yes     Comment: Occasionally    Drug use: No    Sexual activity: Not Currently     Review of Systems   Constitutional: Negative for appetite change, chills, diaphoresis, fatigue and fever.   HENT: Positive for trouble swallowing (Cramping pain radiating from her throat to her stomach). Negative for congestion, ear pain, postnasal drip, rhinorrhea and sore throat.    Eyes: Negative for photophobia and visual disturbance.   Respiratory: Negative for cough, chest tightness, shortness of breath and wheezing.    Cardiovascular: Negative for chest pain, palpitations  and leg swelling.   Gastrointestinal: Positive for constipation. Negative for abdominal distention, abdominal pain, diarrhea, nausea and vomiting.   Genitourinary: Negative for difficulty urinating, dysuria, flank pain and frequency.   Musculoskeletal: Negative for arthralgias, gait problem, myalgias and neck pain.   Skin: Negative for color change, pallor, rash and wound.   Neurological: Negative for dizziness, syncope, weakness, light-headedness and headaches.   Psychiatric/Behavioral: Negative for agitation, confusion and hallucinations. The patient is not nervous/anxious.      Objective:     Vital Signs (Most Recent):  Temp: 98.1 °F (36.7 °C) (05/12/20 0001)  Pulse: 66 (05/12/20 0302)  Resp: 18 (05/12/20 0001)  BP: (!) 164/72 (05/12/20 0302)  SpO2: 100 % (05/12/20 0302) Vital Signs (24h Range):  Temp:  [98.1 °F (36.7 °C)] 98.1 °F (36.7 °C)  Pulse:  [64-92] 66  Resp:  [18] 18  SpO2:  [100 %] 100 %  BP: (156-197)/(69-88) 164/72     Weight: 72.6 kg (160 lb)  Body mass index is 26.02 kg/m².    Physical Exam   Constitutional: She is oriented to person, place, and time. She appears well-developed and well-nourished. No distress.   HENT:   Head: Normocephalic and atraumatic.   Right Ear: External ear normal.   Left Ear: External ear normal.   Mouth/Throat: Oropharynx is clear and moist.   Eyes: Pupils are equal, round, and reactive to light. EOM are normal.   Neck: Normal range of motion. No JVD present.   Cardiovascular: Normal rate, regular rhythm and intact distal pulses.   No murmur heard.  Pulmonary/Chest: Effort normal and breath sounds normal. No respiratory distress. She has no wheezes.   Lungs CTA bilaterally; currently on room air   Abdominal: Soft. Bowel sounds are normal. She exhibits no distension. There is no tenderness.   Genitourinary:   Genitourinary Comments: Deferred   Musculoskeletal: Normal range of motion. She exhibits no edema, tenderness or deformity.   Neurological: She is alert and oriented to  person, place, and time. No sensory deficit.   Skin: Skin is warm and dry. Capillary refill takes less than 2 seconds. She is not diaphoretic.   Psychiatric: She has a normal mood and affect.   Nursing note and vitals reviewed.        CRANIAL NERVES     CN III, IV, VI   Pupils are equal, round, and reactive to light.  Extraocular motions are normal.        Significant Labs:   CBC:   Recent Labs   Lab 05/12/20  0034   WBC 6.39   HGB 11.4*   HCT 36.3*        CMP:   Recent Labs   Lab 05/12/20 0034      K 3.6      CO2 25      BUN 20   CREATININE 1.5*   CALCIUM 9.1   PROT 7.0   ALBUMIN 3.6   BILITOT 0.4   ALKPHOS 51*   AST 17   ALT 17   ANIONGAP 8   EGFRNONAA 34*     All pertinent labs within the past 24 hours have been reviewed.    Significant Imaging: I have reviewed and interpreted all pertinent imaging results/findings within the past 24 hours.

## 2020-05-12 NOTE — ED TRIAGE NOTES
Erica Masterson is here with choking feeling from right side to arm to shoulder.  Starts when she lays down for bed.

## 2020-05-12 NOTE — H&P
[facility] Medicine  History & Physical    Patient Name: Erica Masterson  MRN: [id number]  Admission Date: 5/12/2020  Attending Physician: Bulmaro Justin MD   Primary Care Provider: Narayan Myers MD         Patient information was obtained from patient, past medical records and ER records.     Subjective:     Principal Problem:Acute renal failure superimposed on stage 3 chronic kidney disease    Chief Complaint:   Chief Complaint   Patient presents with    Choking     feeling for months when lying down        HPI: Erica Masterson is a 74 y.o. female with a PMHx of CKD 3, GERD, hyperlipidemia, hypertension, type 2 diabetes, DDD, tuberculosis, and diverticulosis who presents to the ED with multiple nonspecific complaints.  Upon speaking to her chief complaint is a sensation of difficulty swallowing when lying down at night.  She reports it has been an ongoing issue for over a month.  She describes the sensation as cramping, tight from her stomach up to her throat and also through to her back. Her pain has resolved while in the ED after receiving GI cocktail.  She denies any difficulty eating or keeping down p.o. intake.  She denies any nausea, vomiting, or drooling. She does endorse constipation and reports her last BM was 2 days ago. Patient reports having an appointment with Dr. Bentley and Thursday as well as her neurologist.  She denies any abdominal pain, diarrhea, fever/chills, cough, chest pain, or shortness of breath.  Her ED workup patient significant for JEFF and chronic anemia on labs.  Patient will be placed in observation under Hospital Medicine for further workup and evaluation.    Past Medical History:   Diagnosis Date    Allergy     Cataract     Colon polyp     Degenerative arthritis of knee 4/3/2012    Depression 3/26/2012    Diverticulosis     GERD (gastroesophageal reflux disease)     Hyperlipidemia     Hypertension     Multinodular goiter 3/26/2012     Myopathy, unspecified 1/18/2010    New onset type 2 diabetes mellitus 3/24/2020    New onset type 2 diabetes mellitus 3/24/2020    Tuberculosis        Past Surgical History:   Procedure Laterality Date    BREAST BIOPSY Left 2015    benign    COLONOSCOPY  12/2/15    Dr. Britton, multiple polyps, recheck five years-three years if more than 2 polyps are adenomatous    COLONOSCOPY N/A 12/2/2015    COLONOSCOPY N/A 3/20/2019    Procedure: COLONOSCOPY;  Surgeon: Jose Britton MD;  Location: [facility];  Service: Endoscopy;  Laterality: N/A;    FOOT SURGERY      HYSTERECTOMY      KNEE SURGERY  06/06/2013    right knee tear Dr Iverson     LUNG LOBECTOMY  1966    right middle lobectomy, due to Tb    OOPHORECTOMY      SHOULDER SURGERY      francis        Review of patient's allergies indicates:   Allergen Reactions    No known drug allergies        No current facility-administered medications on file prior to encounter.      Current Outpatient Medications on File Prior to Encounter   Medication Sig    ALPRAZolam (XANAX) 0.25 MG tablet Take 1 tablet (0.25 mg total) by mouth 3 (three) times daily as needed for Anxiety. (Patient taking differently: Take 0.125 mg by mouth 3 (three) times daily as needed for Anxiety. Taking 1/2 tab prn)    aspirin (ECOTRIN) 81 MG EC tablet Take 162 mg by mouth once daily.     carboxymethylcellulose sodium 0.25 % Drop Place 1 drop into both eyes once daily.    gabapentin (NEURONTIN) 100 MG capsule Take 1 capsule (100 mg total) by mouth nightly as needed.    losartan (COZAAR) 100 MG tablet Take 1 tablet (100 mg total) by mouth once daily. (Patient taking differently: Take 100 mg by mouth once daily. Taking 2 of her 50mg tabs to use up then will start this rx.)    magnesium 250 mg Tab Take 1 tablet by mouth once daily.    metFORMIN (GLUCOPHAGE-XR) 500 MG XR 24hr tablet Take 1 tablet (500 mg total) by mouth once daily. Take one every morning for one week then increase to one twice a  day thereafter (Patient taking differently: Take 500 mg by mouth once daily. Take one every morning for one week then increase to one twice a day thereafter  Per patient taking 1 qd)    blood sugar diagnostic (BLOOD GLUCOSE TEST) Strp True Metrix glucose strips check once daily    blood-glucose meter kit True Metrix glucometer check glucose once daily    lancets Misc True Metrix lancets check once daily    [DISCONTINUED] amoxicillin-clavulanate 875-125mg (AUGMENTIN) 875-125 mg per tablet Take 1 tablet by mouth 2 (two) times daily.     Family History     Problem Relation (Age of Onset)    Alcohol abuse Son    Alzheimer's disease Maternal Uncle, Sister    Breast cancer Sister (60)    Cancer Other, Mother, Brother, Maternal Grandmother    Colon cancer Other    Dementia Sister    Diabetes Son, Maternal Aunt    Hypertension Mother, Father, Son    No Known Problems Daughter    Obesity Paternal Uncle        Tobacco Use    Smoking status: Former Smoker    Smokeless tobacco: Never Used    Tobacco comment: quit in 1963   Substance and Sexual Activity    Alcohol use: Yes     Comment: Occasionally    Drug use: No    Sexual activity: Not Currently     Review of Systems   Constitutional: Negative for appetite change, chills, diaphoresis, fatigue and fever.   HENT: Positive for trouble swallowing (Cramping pain radiating from her throat to her stomach). Negative for congestion, ear pain, postnasal drip, rhinorrhea and sore throat.    Eyes: Negative for photophobia and visual disturbance.   Respiratory: Negative for cough, chest tightness, shortness of breath and wheezing.    Cardiovascular: Negative for chest pain, palpitations and leg swelling.   Gastrointestinal: Positive for constipation. Negative for abdominal distention, abdominal pain, diarrhea, nausea and vomiting.   Genitourinary: Negative for difficulty urinating, dysuria, flank pain and frequency.   Musculoskeletal: Negative for arthralgias, gait problem,  myalgias and neck pain.   Skin: Negative for color change, pallor, rash and wound.   Neurological: Negative for dizziness, syncope, weakness, light-headedness and headaches.   Psychiatric/Behavioral: Negative for agitation, confusion and hallucinations. The patient is not nervous/anxious.      Objective:     Vital Signs (Most Recent):  Temp: 98.1 °F (36.7 °C) (05/12/20 0001)  Pulse: 66 (05/12/20 0302)  Resp: 18 (05/12/20 0001)  BP: (!) 164/72 (05/12/20 0302)  SpO2: 100 % (05/12/20 0302) Vital Signs (24h Range):  Temp:  [98.1 °F (36.7 °C)] 98.1 °F (36.7 °C)  Pulse:  [64-92] 66  Resp:  [18] 18  SpO2:  [100 %] 100 %  BP: (156-197)/(69-88) 164/72     Weight: 72.6 kg (160 lb)  Body mass index is 26.02 kg/m².    Physical Exam   Constitutional: She is oriented to person, place, and time. She appears well-developed and well-nourished. No distress.   HENT:   Head: Normocephalic and atraumatic.   Right Ear: External ear normal.   Left Ear: External ear normal.   Mouth/Throat: Oropharynx is clear and moist.   Eyes: Pupils are equal, round, and reactive to light. EOM are normal.   Neck: Normal range of motion. No JVD present.   Cardiovascular: Normal rate, regular rhythm and intact distal pulses.   No murmur heard.  Pulmonary/Chest: Effort normal and breath sounds normal. No respiratory distress. She has no wheezes.   Lungs CTA bilaterally; currently on room air   Abdominal: Soft. Bowel sounds are normal. She exhibits no distension. There is no tenderness.   Genitourinary:   Genitourinary Comments: Deferred   Musculoskeletal: Normal range of motion. She exhibits no edema, tenderness or deformity.   Neurological: She is alert and oriented to person, place, and time. No sensory deficit.   Skin: Skin is warm and dry. Capillary refill takes less than 2 seconds. She is not diaphoretic.   Psychiatric: She has a normal mood and affect.   Nursing note and vitals reviewed.        CRANIAL NERVES     CN III, IV, VI   Pupils are equal,  round, and reactive to light.  Extraocular motions are normal.        Significant Labs:   CBC:   Recent Labs   Lab 05/12/20 0034   WBC 6.39   HGB 11.4*   HCT 36.3*        CMP:   Recent Labs   Lab 05/12/20 0034      K 3.6      CO2 25      BUN 20   CREATININE 1.5*   CALCIUM 9.1   PROT 7.0   ALBUMIN 3.6   BILITOT 0.4   ALKPHOS 51*   AST 17   ALT 17   ANIONGAP 8   EGFRNONAA 34*     All pertinent labs within the past 24 hours have been reviewed.    Significant Imaging: I have reviewed and interpreted all pertinent imaging results/findings within the past 24 hours.    Assessment/Plan:     * Acute renal failure superimposed on stage 3 chronic kidney disease  Likely secondary to volume depletion.  Continue IVF hydration.  Follow renal panel and electrolytes closely.  Adjust renal dose medications for creatinine clearance Estimated Creatinine Clearance: 32.4 mL/min (A) (based on SCr of 1.5 mg/dL (H)).   Avoid NSAIDs, Vega-II inhibitors, ACE-I, Angiotensin Receptor Blockers, or Aminoglycosides.  Urinalysis ordered.    Constipation  Chronic issue.   Start MiraLax daily.    KUB ordered.    Anemia  Patient's anemia is currently controlled. S/p 0 units of PRBCs.   Current CBC reviewed-   Lab Results   Component Value Date    HGB 11.4 (L) 05/12/2020    HCT 36.3 (L) 05/12/2020     Monitor serial CBC and transfuse if patient becomes hemodynamically unstable, symptomatic or H/H drops below 7/21.     Type 2 diabetes mellitus, without long-term current use of insulin  Patient's FSGs are controlled on current hypoglycemics.   Last A1c reviewed-   Lab Results   Component Value Date    HGBA1C 6.5 (H) 03/23/2020     Most recent fingerstick glucose reviewed- No results for input(s): POCTGLUCOSE in the last 24 hours.  Current correctional scale  Low  Maintain anti-hyperglycemic dose as follows-   Antihyperglycemics (From admission, onward)    None        Low-dose sliding scale.    Accu-Cheks a.c./HS  Diabetic  diet.    GERD (gastroesophageal reflux disease)  Chronic. Not currently on PPI.  Symptoms may be related to GERD.  Will start PPI.  Patient has GI follow-up on Thursday.    Anxiety  Patient with multiple ED visits for vague symptoms. Noted to be very anxious, given short-term prescription for benzos. Continue low-dose Xanax for now.  Patient would benefit from outpatient follow-up.    Hypertension associated with diabetes  Chronic, controlled.  Latest blood pressure and vitals reviewed-   Temp:  [98.1 °F (36.7 °C)]   Pulse:  [66-92]   Resp:  [18]   BP: (156-197)/(69-88)   SpO2:  [100 %] .   Home meds for hypertension were reviewed and noted below. Hospital anti-hypertensive changes were made as shown below.  Hypertension Medications             losartan (COZAAR) 100 MG tablet Take 1 tablet (100 mg total) by mouth once daily.        Will utilize p.r.n. blood pressure medication only if patient's blood pressure greater than  180/110 and she develops symptoms such as worsening chest pain or shortness of breath.      VTE Risk Mitigation (From admission, onward)    Low Risk   SCDs          Alexus Ramos NP  Department of Hospital Medicine   [facility]

## 2020-05-13 ENCOUNTER — OFFICE VISIT (OUTPATIENT)
Dept: OTOLARYNGOLOGY | Facility: CLINIC | Age: 75
End: 2020-05-13
Payer: MEDICARE

## 2020-05-13 ENCOUNTER — TELEPHONE (OUTPATIENT)
Dept: MEDSURG UNIT | Facility: HOSPITAL | Age: 75
End: 2020-05-13

## 2020-05-13 VITALS — BODY MASS INDEX: 26.35 KG/M2 | TEMPERATURE: 98 F | WEIGHT: 162.06 LBS

## 2020-05-13 DIAGNOSIS — R09.A2 GLOBUS SENSATION: Primary | ICD-10-CM

## 2020-05-13 DIAGNOSIS — E86.0 DEHYDRATION: ICD-10-CM

## 2020-05-13 DIAGNOSIS — J02.9 PHARYNGITIS, UNSPECIFIED ETIOLOGY: ICD-10-CM

## 2020-05-13 PROCEDURE — 99999 PR PBB SHADOW E&M-EST. PATIENT-LVL III: ICD-10-PCS | Mod: PBBFAC,,, | Performed by: OTOLARYNGOLOGY

## 2020-05-13 PROCEDURE — 1159F PR MEDICATION LIST DOCUMENTED IN MEDICAL RECORD: ICD-10-PCS | Mod: S$GLB,,, | Performed by: OTOLARYNGOLOGY

## 2020-05-13 PROCEDURE — 1101F PT FALLS ASSESS-DOCD LE1/YR: CPT | Mod: CPTII,S$GLB,, | Performed by: OTOLARYNGOLOGY

## 2020-05-13 PROCEDURE — 99999 PR PBB SHADOW E&M-EST. PATIENT-LVL III: CPT | Mod: PBBFAC,,, | Performed by: OTOLARYNGOLOGY

## 2020-05-13 PROCEDURE — 1159F MED LIST DOCD IN RCRD: CPT | Mod: S$GLB,,, | Performed by: OTOLARYNGOLOGY

## 2020-05-13 PROCEDURE — 31575 PR LARYNGOSCOPY, FLEXIBLE; DIAGNOSTIC: ICD-10-PCS | Mod: S$GLB,,, | Performed by: OTOLARYNGOLOGY

## 2020-05-13 PROCEDURE — 1101F PR PT FALLS ASSESS DOC 0-1 FALLS W/OUT INJ PAST YR: ICD-10-PCS | Mod: CPTII,S$GLB,, | Performed by: OTOLARYNGOLOGY

## 2020-05-13 PROCEDURE — 99203 PR OFFICE/OUTPT VISIT, NEW, LEVL III, 30-44 MIN: ICD-10-PCS | Mod: 25,S$GLB,, | Performed by: OTOLARYNGOLOGY

## 2020-05-13 PROCEDURE — 31575 DIAGNOSTIC LARYNGOSCOPY: CPT | Mod: S$GLB,,, | Performed by: OTOLARYNGOLOGY

## 2020-05-13 PROCEDURE — 99203 OFFICE O/P NEW LOW 30 MIN: CPT | Mod: 25,S$GLB,, | Performed by: OTOLARYNGOLOGY

## 2020-05-13 NOTE — PATIENT INSTRUCTIONS
"Mucous (Post nasal drip) Management    A fullness sensation in the back of the throat is called "globus."   Many people attribute this fullness to a sensation of increased mucous, because they can feel a sticky material in the throat that they will occasionally cough up.    Nasal  / Throat Mucous  Our body NORMALLY makes 1 liter or more of saliva (spit) and nasal mucous every day. These body fluids are important for breaking down the food we eat, protecting our teeth from cavities, and clearing out the pollen and irritants that we breathe in our nose. As our body makes these fluids, we swallow them throughout the day, where they are recycled back through the body. This process is happening all our life without us thinking about it. When an adjustment to this process causes the mucous to be increased or thicker, is when we notice it.     Some problems cause an INCREASE in these body fluids, which we perceive as irritating, excess phlegm in the throat.   However, it is not always an increase. Many times there is a change in CONSISTENCY of the mucous to make it thicker. This will cause the mucous to be held up in the throat instead of being swallowed easily.    Several factors can cause these problems:  1. Dryness of throat and nose which increases the mucous sticking - Causes include inadequate hydration, excess caffeine / soda / sugary drinks, medication side effects.  2. Acid reflux  3. Increased mucous production from allergies or chronic sinus drainage.     We will evaluate the cause(s) of increased or thickened mucous and consider different treatment strategies:    MANY COMMON medications cause dryness of the throat, including medications for allergy, depression/anxiety, heart, and urination issues.   There is some evidence that added sugars or processed sugars in the diet (not the kind that occur naturally in honey or ripe fruit) can increase mucus, as well as too much dairy. To avoid these refined " carbohydrates, on food labels, watch out for wheat flour (also called white, refined or enriched flour) on the ingredients list.     Recommendations for Increased Mucous Sensation    1. Water, water, water - this cannot be understated. Most people are not drinking enough water regularly to keep up with body's demand. Recommend AT LEAST 64 oz of water per day, and more for men (up to 100 oz.) unless a medical issue prevents this.  2. Reduce intake of caffeine / tea / sugary drinks or soda, which all will cause increased mucous.  3. If your nose is the source of mucous, nasal medications discussed by your doctor as well as nasal saline can be effective to wash away the mucous.  4. Prevention of acid reflux by avoiding late-night eating (nothing 3 hours before laying down at night), greasy and spicy foods, alcohol, and acidic foods  5. Humidifier in the bedroom if you find dryness increases at night.  6. Smoking cessation if you use tobacco  7. Examination your medication list with your doctor to determine medications that may be contributing    There are NUMEROUS over the counter mucous thinning agents. Here are some suggestions that can be purchased online:  1. Cosby's Breezer's (Sugar Free), Sergio's black currant pastilles, and Entertainer's Secret Throat Relief can all help dry mouth and thickened mucous.   2. Biotene spray and mouthwash can help lubricate a dry mouth  3. Mucinex can be helpful in some cases, but stop taking if you don't notice improvement after a few weeks.

## 2020-05-13 NOTE — PROGRESS NOTES
"Subjective:       Patient ID: Erica Masterson is a 74 y.o. female.    Chief Complaint: Sore Throat    Eirca is here for dry mouth and dysphagia.   Dry mouth has been worse for 3 weeks. Main issues with swallowing are right sided "thick" mucous sensation. Denies blood. She denies coughing or choking with food. Denies food getting stuck.   She was recently  in the ER for JEFF which resolved quickly with IVF. She does report good hydration overall. She denies coffee of soda. She has been having muscle cramping for a few weeks, though will occ have issues for years.   Mother had throat cancer and fam hx of thyroid issues. She does exercises regularly.      Pertinent medical issues: DM, Htn, GERD, TB    Social History     Tobacco Use   Smoking Status Former Smoker   Smokeless Tobacco Never Used   Tobacco Comment    quit in 1963     Social History     Substance and Sexual Activity   Alcohol Use Not Currently    Comment: stopped 2019          Review of Systems   Constitutional: Negative for activity change and appetite change.   Eyes: Negative for discharge.   Respiratory: Negative for difficulty breathing and wheezing   Cardiovascular: Negative for chest pain.   Gastrointestinal: Negative for abdominal distention and abdominal pain.   Endocrine: Negative for cold intolerance and heat intolerance.   Genitourinary: Negative for dysuria.   Musculoskeletal: Negative for gait problem and joint swelling.   Skin: Negative for color change and pallor.   Neurological: Negative for syncope and weakness.   Psychiatric/Behavioral: Negative for agitation and confusion.     Objective:        Constitutional:   She is oriented to person, place, and time. She appears well-developed and well-nourished. She appears alert. She is active. Normal speech.      Head:  Normocephalic and atraumatic. Head is without TMJ tenderness. No scars. Salivary glands normal.  Facial strength is normal.      Ears:    Right Ear: No drainage or swelling. No middle " ear effusion.   Left Ear: No drainage or swelling.  No middle ear effusion.     Nose:  No mucosal edema, rhinorrhea or sinus tenderness. No turbinate hypertrophy.      Mouth/Throat  Oropharynx clear and moist without lesions or asymmetry, normal uvula midline and mirror exam normal. Normal dentition. No uvula swelling, lacerations or trismus. No oropharyngeal exudate. Tonsils present (1+ right, regressed left).  Tonsillar erythema, tonsillar exudate.    Strong gag, unable to mirror      Neck:  Full range of motion with neck supple and no adenopathy. Thyroid tenderness is present. No tracheal deviation, no edema, no erythema, normal range of motion, no stridor, no crepitus and no neck rigidity present. No thyroid mass present.     Cardiovascular:   Intact distal pulses and normal pulses.      Pulmonary/Chest:   Effort normal and breath sounds normal. No stridor.     Psychiatric:   Her speech is normal and behavior is normal. Her mood appears not anxious. Her affect is not labile.     Neurological:   She is alert and oriented to person, place, and time. No sensory deficit.     Skin:   No abrasions, lacerations, lesions, or rashes. No abrasion and no bruising noted.         Tests / Results:  U/S  Impression       There is no significant or detrimental change in the appearance of the thyroid gland which remains enlarged with a multinodular appearance.  Dominant nodules are described above and were present previously.  There is no new nodule which meets criteria for FNA or biopsy.  As previously demonstrated, the largest solid nodule in the upper pole of the left lobe meets criteria for FNA or biopsy but is without definite change.       Pre-procedure diagnosis: The primary encounter diagnosis was Globus sensation. Diagnoses of Dehydration and Pharyngitis, unspecified etiology were also pertinent to this visit.     Post-procedure diagnosis: same    Procedure: Flexible fiberoptic laryngoscopy    Surgeon: Raman Soto  MD    Anesthesia: 2% Lidocaine with Phenylephrine topical    Risks, benefits, and alternatives of the procedure were discussed with the patient, and the patient consented to the fiberoptic examination.  We applied a topical nasal decongestant and analgesic.  After adequate anesthesia was obtained, the flexible fiberoptic scope was passed through the nasal cavity. The entire pharynx (nasopharynx to hypopharynx) and the larynx were visualized. At the end of the examination, the scope was removed. The patient tolerated the procedure well with no complications.     Findings:  -     Laryngeal mucosa is normal  -     Post-cricoid region: normal  -     Lingual tonsils have no hypertrophy - benign appearing prominence of R inf tonsil pole relative to left  -     Adenoids have no  hypertrophy  -     Right vocal fold: normal mobility     mass/lesion: none  -     Left vocal fold: normal mobility     mass/lesion: none  -     Other findings: mild incr mucous on vocal folds, relatively normal saliva otherwise    Assessment:       1. Globus sensation    2. Dehydration    3. Pharyngitis, unspecified etiology          Plan:         Reassured no masses or lesions  Suspect issues related to resolving viral pharyngitis predominantly affecting Right side, in addition to dehydration. Recent brief admission and rehydration. Discussed continue hydration and mucous management. Will continue to improve over 1-2 months. If persistent. Can consider imaging.       The visit / procedure with the patient was defined as time sensitive

## 2020-05-14 ENCOUNTER — TELEPHONE (OUTPATIENT)
Dept: FAMILY MEDICINE | Facility: CLINIC | Age: 75
End: 2020-05-14

## 2020-05-14 ENCOUNTER — OFFICE VISIT (OUTPATIENT)
Dept: GASTROENTEROLOGY | Facility: CLINIC | Age: 75
End: 2020-05-14
Payer: MEDICARE

## 2020-05-14 VITALS
HEIGHT: 66 IN | SYSTOLIC BLOOD PRESSURE: 149 MMHG | DIASTOLIC BLOOD PRESSURE: 74 MMHG | WEIGHT: 162.5 LBS | HEART RATE: 4 BPM | BODY MASS INDEX: 26.12 KG/M2

## 2020-05-14 DIAGNOSIS — K21.9 GASTROESOPHAGEAL REFLUX DISEASE, ESOPHAGITIS PRESENCE NOT SPECIFIED: ICD-10-CM

## 2020-05-14 DIAGNOSIS — K59.09 CHRONIC CONSTIPATION: ICD-10-CM

## 2020-05-14 DIAGNOSIS — K62.89 RECTAL PAIN: ICD-10-CM

## 2020-05-14 DIAGNOSIS — Z01.818 PREOP TESTING: Primary | ICD-10-CM

## 2020-05-14 DIAGNOSIS — D50.9 IRON DEFICIENCY ANEMIA, UNSPECIFIED IRON DEFICIENCY ANEMIA TYPE: ICD-10-CM

## 2020-05-14 DIAGNOSIS — R10.13 EPIGASTRIC ABDOMINAL PAIN: ICD-10-CM

## 2020-05-14 PROCEDURE — 99999 PR PBB SHADOW E&M-EST. PATIENT-LVL III: ICD-10-PCS | Mod: PBBFAC,,, | Performed by: INTERNAL MEDICINE

## 2020-05-14 PROCEDURE — 99214 OFFICE O/P EST MOD 30 MIN: CPT | Mod: S$GLB,,, | Performed by: INTERNAL MEDICINE

## 2020-05-14 PROCEDURE — 3077F PR MOST RECENT SYSTOLIC BLOOD PRESSURE >= 140 MM HG: ICD-10-PCS | Mod: CPTII,S$GLB,, | Performed by: INTERNAL MEDICINE

## 2020-05-14 PROCEDURE — 1159F MED LIST DOCD IN RCRD: CPT | Mod: S$GLB,,, | Performed by: INTERNAL MEDICINE

## 2020-05-14 PROCEDURE — 3078F PR MOST RECENT DIASTOLIC BLOOD PRESSURE < 80 MM HG: ICD-10-PCS | Mod: CPTII,S$GLB,, | Performed by: INTERNAL MEDICINE

## 2020-05-14 PROCEDURE — 1101F PR PT FALLS ASSESS DOC 0-1 FALLS W/OUT INJ PAST YR: ICD-10-PCS | Mod: CPTII,S$GLB,, | Performed by: INTERNAL MEDICINE

## 2020-05-14 PROCEDURE — 3077F SYST BP >= 140 MM HG: CPT | Mod: CPTII,S$GLB,, | Performed by: INTERNAL MEDICINE

## 2020-05-14 PROCEDURE — 3078F DIAST BP <80 MM HG: CPT | Mod: CPTII,S$GLB,, | Performed by: INTERNAL MEDICINE

## 2020-05-14 PROCEDURE — 99214 PR OFFICE/OUTPT VISIT, EST, LEVL IV, 30-39 MIN: ICD-10-PCS | Mod: S$GLB,,, | Performed by: INTERNAL MEDICINE

## 2020-05-14 PROCEDURE — 1125F PR PAIN SEVERITY QUANTIFIED, PAIN PRESENT: ICD-10-PCS | Mod: S$GLB,,, | Performed by: INTERNAL MEDICINE

## 2020-05-14 PROCEDURE — 1125F AMNT PAIN NOTED PAIN PRSNT: CPT | Mod: S$GLB,,, | Performed by: INTERNAL MEDICINE

## 2020-05-14 PROCEDURE — 99999 PR PBB SHADOW E&M-EST. PATIENT-LVL III: CPT | Mod: PBBFAC,,, | Performed by: INTERNAL MEDICINE

## 2020-05-14 PROCEDURE — 1159F PR MEDICATION LIST DOCUMENTED IN MEDICAL RECORD: ICD-10-PCS | Mod: S$GLB,,, | Performed by: INTERNAL MEDICINE

## 2020-05-14 PROCEDURE — 1101F PT FALLS ASSESS-DOCD LE1/YR: CPT | Mod: CPTII,S$GLB,, | Performed by: INTERNAL MEDICINE

## 2020-05-14 NOTE — TELEPHONE ENCOUNTER
----- Message from Jose Evangelista sent at 5/14/2020 12:49 PM CDT -----  Contact: pt  Type: Needs Medical Advice    Who Called:  pt    Best Call Back Number: [phone] or [phone]  Additional Information: pt would like to discuss her upcoming appointment on 05/15/2020. Please call to advise.

## 2020-05-14 NOTE — PROGRESS NOTES
"Subjective:       Patient ID: Erica Masterson is a 74 y.o. female.    This is an established patient.      Chief Complaint: Abdominal Pain    Abdominal Pain   This is a new problem. The current episode started 1 to 4 weeks ago. The onset quality is undetermined. The problem occurs daily. Duration: variable. The problem has been waxing and waning. The pain is located in the epigastric region. The pain is at a severity of 6/10. The pain is moderate. The quality of the pain is dull. The abdominal pain does not radiate. Associated symptoms include constipation (worse lately with hard BMs and straining.  Frequency about every 3 days.) and nausea. Pertinent negatives include no diarrhea, hematochezia, melena, vomiting or weight loss. Nothing aggravates the pain. The pain is relieved by bowel movements. Treatments tried: laxatives, stool softener.  Had linzess in the past. The treatment provided mild relief. Prior diagnostic workup includes lower endoscopy and upper endoscopy (Last EGD 2016.  Last colonoscopy 2018,). Her past medical history is significant for GERD and irritable bowel syndrome.   She has a new anemia on recent lab work with iron studies suggestive of iron deficiency.  She also complains of rectal pain and burning which are new.   She also complains of increased GERD symptoms including belching and heartburn.      Review of Systems   Constitutional: Negative for weight loss.   Gastrointestinal: Positive for abdominal pain, constipation (worse lately with hard BMs and straining.  Frequency about every 3 days.), nausea and rectal pain. Negative for diarrhea, hematochezia, melena and vomiting.       Objective:       Vitals:    05/14/20 1445   BP: (!) 149/74   Pulse: (!) 4   Weight: 73.7 kg (162 lb 7.7 oz)   Height: 5' 5.75" (1.67 m)       Physical Exam   Constitutional: She is oriented to person, place, and time. She appears well-developed and well-nourished.   HENT:   Head: Normocephalic and atraumatic. "   Eyes: Pupils are equal, round, and reactive to light. No scleral icterus.   Neck: Normal range of motion.   Cardiovascular: Normal rate and regular rhythm.   No murmur heard.  Pulmonary/Chest: Effort normal and breath sounds normal. She has no wheezes.   Abdominal: Soft. Bowel sounds are normal. She exhibits no distension. There is tenderness (mild, epigastric).   Musculoskeletal: She exhibits no edema or tenderness.   Lymphadenopathy:     She has no cervical adenopathy.   Neurological: She is alert and oriented to person, place, and time.   Skin: Skin is warm and dry. No rash noted.       Lab Results   Component Value Date    WBC 6.39 05/12/2020    HGB 11.4 (L) 05/12/2020    HCT 36.3 (L) 05/12/2020    MCV 94 05/12/2020     05/12/2020         CMP  Sodium   Date Value Ref Range Status   05/12/2020 140 136 - 145 mmol/L Final     Potassium   Date Value Ref Range Status   05/12/2020 4.4 3.5 - 5.1 mmol/L Final     Chloride   Date Value Ref Range Status   05/12/2020 106 95 - 110 mmol/L Final     CO2   Date Value Ref Range Status   05/12/2020 27 23 - 29 mmol/L Final     Glucose   Date Value Ref Range Status   05/12/2020 85 70 - 110 mg/dL Final     BUN, Bld   Date Value Ref Range Status   05/12/2020 14 8 - 23 mg/dL Final     Creatinine   Date Value Ref Range Status   05/12/2020 0.9 0.5 - 1.4 mg/dL Final   06/04/2013 0.9 0.5 - 1.4 mg/dL Final     Calcium   Date Value Ref Range Status   05/12/2020 8.9 8.7 - 10.5 mg/dL Final   06/04/2013 9.7 8.7 - 10.5 mg/dL Final     Total Protein   Date Value Ref Range Status   05/12/2020 7.0 6.0 - 8.4 g/dL Final     Albumin   Date Value Ref Range Status   05/12/2020 3.6 3.5 - 5.2 g/dL Final     Total Bilirubin   Date Value Ref Range Status   05/12/2020 0.4 0.1 - 1.0 mg/dL Final     Comment:     For infants and newborns, interpretation of results should be based  on gestational age, weight and in agreement with clinical  observations.  Premature Infant recommended reference  ranges:  Up to 24 hours.............<8.0 mg/dL  Up to 48 hours............<12.0 mg/dL  3-5 days..................<15.0 mg/dL  6-29 days.................<15.0 mg/dL       Alkaline Phosphatase   Date Value Ref Range Status   05/12/2020 51 (L) 55 - 135 U/L Final     AST   Date Value Ref Range Status   05/12/2020 17 10 - 40 U/L Final     ALT   Date Value Ref Range Status   05/12/2020 17 10 - 44 U/L Final     Anion Gap   Date Value Ref Range Status   05/12/2020 7 (L) 8 - 16 mmol/L Final   06/04/2013 9 5 - 15 meq/L Final     eGFR if    Date Value Ref Range Status   05/12/2020 >60 >60 mL/min/1.73 m^2 Final     eGFR if non    Date Value Ref Range Status   05/12/2020 >60 >60 mL/min/1.73 m^2 Final     Comment:     Calculation used to obtain the estimated glomerular filtration  rate (eGFR) is the CKD-EPI equation.          Assessment:       1. Preop testing    2. Iron deficiency anemia, unspecified iron deficiency anemia type    3. Epigastric abdominal pain    4. Rectal pain    5. Gastroesophageal reflux disease, esophagitis presence not specified    6. Chronic constipation        Plan:       1.  Recommend daily exercise, adequate water intake, and high fiber diet.  Recommend daily miralax (17g PO once or twice daily) with intermittently dosed dulcolax (every 2-3 days)  to facilitate bowel movements.  If no relief with this, consider adding emollient laxative (castor oil or mineral oil) +/- enema.  2.  If no relief with above, consider restarting Linzess  3.  Antireflux precautions including avoidance of late night eating and lying down soon after eating.     4.  EGD for GERD  5.  Colonoscopy for NEMO.  6.  Recommend balneol lotion PRN for rectal irritation.  Rectal exam deferred for today and will be done at time of colonoscopy  7.  Further recommendations to follow after above.

## 2020-05-14 NOTE — TELEPHONE ENCOUNTER
----- Message from Ofe Samano sent at 5/14/2020  3:08 PM CDT -----  Type:  Patient Returning Call    Who Called:  Patient  Who Left Message for Patient:  Rebeca  Does the patient know what this is regarding?:  Message sent  Best Call Back Number:  [phone]  Additional Information:

## 2020-05-14 NOTE — TELEPHONE ENCOUNTER
Spoke with patient, she said that shefali said her appointment was wrong tomorrow but there were no notes regarding this, informed patient that Shefali left for the day and that she would call her in the morning if the appointment needed to be changed.

## 2020-05-15 ENCOUNTER — OFFICE VISIT (OUTPATIENT)
Dept: FAMILY MEDICINE | Facility: CLINIC | Age: 75
End: 2020-05-15
Attending: FAMILY MEDICINE
Payer: MEDICARE

## 2020-05-15 VITALS
SYSTOLIC BLOOD PRESSURE: 120 MMHG | RESPIRATION RATE: 20 BRPM | WEIGHT: 161.38 LBS | HEIGHT: 66 IN | TEMPERATURE: 98 F | HEART RATE: 95 BPM | DIASTOLIC BLOOD PRESSURE: 70 MMHG | BODY MASS INDEX: 25.94 KG/M2 | OXYGEN SATURATION: 99 %

## 2020-05-15 DIAGNOSIS — E11.9 NEW ONSET TYPE 2 DIABETES MELLITUS: ICD-10-CM

## 2020-05-15 DIAGNOSIS — R39.9 UTI SYMPTOMS: Primary | ICD-10-CM

## 2020-05-15 DIAGNOSIS — N30.01 ACUTE CYSTITIS WITH HEMATURIA: ICD-10-CM

## 2020-05-15 DIAGNOSIS — K57.92 DIVERTICULITIS: ICD-10-CM

## 2020-05-15 DIAGNOSIS — N17.9 AKI (ACUTE KIDNEY INJURY): ICD-10-CM

## 2020-05-15 LAB
BILIRUB SERPL-MCNC: ABNORMAL MG/DL
BLOOD URINE, POC: 250
COLOR, POC UA: ABNORMAL
GLUCOSE UR QL STRIP: ABNORMAL
KETONES UR QL STRIP: ABNORMAL
LEUKOCYTE ESTERASE URINE, POC: ABNORMAL
NITRITE, POC UA: ABNORMAL
PH, POC UA: 5
PROTEIN, POC: ABNORMAL
SPECIFIC GRAVITY, POC UA: 1.02
UROBILINOGEN, POC UA: ABNORMAL

## 2020-05-15 PROCEDURE — 81002 POCT URINE DIPSTICK WITHOUT MICROSCOPE: ICD-10-PCS | Mod: S$GLB,,, | Performed by: FAMILY MEDICINE

## 2020-05-15 PROCEDURE — 1101F PR PT FALLS ASSESS DOC 0-1 FALLS W/OUT INJ PAST YR: ICD-10-PCS | Mod: CPTII,S$GLB,, | Performed by: FAMILY MEDICINE

## 2020-05-15 PROCEDURE — 99214 PR OFFICE/OUTPT VISIT, EST, LEVL IV, 30-39 MIN: ICD-10-PCS | Mod: 25,S$GLB,, | Performed by: FAMILY MEDICINE

## 2020-05-15 PROCEDURE — 87086 URINE CULTURE/COLONY COUNT: CPT

## 2020-05-15 PROCEDURE — 3074F SYST BP LT 130 MM HG: CPT | Mod: CPTII,S$GLB,, | Performed by: FAMILY MEDICINE

## 2020-05-15 PROCEDURE — 99214 OFFICE O/P EST MOD 30 MIN: CPT | Mod: 25,S$GLB,, | Performed by: FAMILY MEDICINE

## 2020-05-15 PROCEDURE — 99999 PR PBB SHADOW E&M-EST. PATIENT-LVL III: CPT | Mod: PBBFAC,,, | Performed by: FAMILY MEDICINE

## 2020-05-15 PROCEDURE — 81002 URINALYSIS NONAUTO W/O SCOPE: CPT | Mod: S$GLB,,, | Performed by: FAMILY MEDICINE

## 2020-05-15 PROCEDURE — 3078F PR MOST RECENT DIASTOLIC BLOOD PRESSURE < 80 MM HG: ICD-10-PCS | Mod: CPTII,S$GLB,, | Performed by: FAMILY MEDICINE

## 2020-05-15 PROCEDURE — 1159F PR MEDICATION LIST DOCUMENTED IN MEDICAL RECORD: ICD-10-PCS | Mod: S$GLB,,, | Performed by: FAMILY MEDICINE

## 2020-05-15 PROCEDURE — 3078F DIAST BP <80 MM HG: CPT | Mod: CPTII,S$GLB,, | Performed by: FAMILY MEDICINE

## 2020-05-15 PROCEDURE — 99999 PR PBB SHADOW E&M-EST. PATIENT-LVL III: ICD-10-PCS | Mod: PBBFAC,,, | Performed by: FAMILY MEDICINE

## 2020-05-15 PROCEDURE — 99499 RISK ADDL DX/OHS AUDIT: ICD-10-PCS | Mod: S$GLB,,, | Performed by: FAMILY MEDICINE

## 2020-05-15 PROCEDURE — 1126F PR PAIN SEVERITY QUANTIFIED, NO PAIN PRESENT: ICD-10-PCS | Mod: S$GLB,,, | Performed by: FAMILY MEDICINE

## 2020-05-15 PROCEDURE — 99499 UNLISTED E&M SERVICE: CPT | Mod: S$GLB,,, | Performed by: FAMILY MEDICINE

## 2020-05-15 PROCEDURE — 1126F AMNT PAIN NOTED NONE PRSNT: CPT | Mod: S$GLB,,, | Performed by: FAMILY MEDICINE

## 2020-05-15 PROCEDURE — 3074F PR MOST RECENT SYSTOLIC BLOOD PRESSURE < 130 MM HG: ICD-10-PCS | Mod: CPTII,S$GLB,, | Performed by: FAMILY MEDICINE

## 2020-05-15 PROCEDURE — 1101F PT FALLS ASSESS-DOCD LE1/YR: CPT | Mod: CPTII,S$GLB,, | Performed by: FAMILY MEDICINE

## 2020-05-15 PROCEDURE — 1159F MED LIST DOCD IN RCRD: CPT | Mod: S$GLB,,, | Performed by: FAMILY MEDICINE

## 2020-05-15 RX ORDER — GABAPENTIN 100 MG/1
200 CAPSULE ORAL NIGHTLY
COMMUNITY
End: 2020-06-02

## 2020-05-15 RX ORDER — MICONAZOLE NITRATE 2 %
1 CREAM WITH APPLICATOR VAGINAL NIGHTLY
COMMUNITY
End: 2020-06-02

## 2020-05-15 RX ORDER — AMOXICILLIN AND CLAVULANATE POTASSIUM 500; 125 MG/1; MG/1
1 TABLET, FILM COATED ORAL 2 TIMES DAILY
Qty: 14 TABLET | Refills: 0 | Status: SHIPPED | OUTPATIENT
Start: 2020-05-15 | End: 2020-05-22

## 2020-05-15 NOTE — PROGRESS NOTES
Subjective:       Patient ID: Erica Masterson is a 74 y.o. female.    Chief Complaint: Hospital Follow Up    74-year-old female found to be new onset diabetic relatively recently and started on metformin 500 mg once a day for one week then b.i.d. on April 28th.  Not long after she began experiencing some nausea abdominal discomfort with epigastric and lower abdominal discomfort at the same time and some symptoms of reflux that occurred mostly at night.  She has been having other vague symptoms some of which were probably related to anxiety culminating in a trip to the emergency room on May 12th with vague discomfort but also dysphagia at night and abdominal pain.  She was found to have an increase in her creatinine from 0.8 to 1.5 and a decrease in her GFR and was kept in the emergency room overnight on IV fluids for hydration.  By the time she was discharged the next morning her kidney function had returned almost to baseline and she had a follow-up appointment with ENT, Dr. Gleason and GI, Dr. Britton.  She also had a neurology visit but I am unable to locate any records of that.  Dr. Gleason felt she was having a globus sensation and Dr. Britton has her scheduled for an upper and lower endoscopy on next Wednesday the 20th.  Right now her symptoms are relatively benign but she is having some urinary burning and frequency with no cloudiness or smoking color, no fever no chills no night sweats and no nausea or vomiting.  Her epigastric discomfort has resolved but it does usually recur at night.  She cut the metformin back down to one a day which may have helped a little bit but it did not stop her GI problems.  She does have a known history of diverticulosis but has not seen any blood in the bowel movements nor has she seen any melanotic stool.    Her blood sugars have been ranging from 100 to 118 in the mornings on the metformin    Past Medical History:  No date: Allergy  No date: Anxiety  No date: Cataract  No  date: Colon polyp  4/3/2012: Degenerative arthritis of knee  No date: Diverticulosis  No date: GERD (gastroesophageal reflux disease)  No date: Hyperlipidemia  No date: Hypertension  3/26/2012: Multinodular goiter  1/18/2010: Myopathy, unspecified  3/24/2020: New onset type 2 diabetes mellitus  3/24/2020: New onset type 2 diabetes mellitus  No date: Tuberculosis    Past Surgical History:  2015: BREAST BIOPSY; Left      Comment:  benign  12/2/15: COLONOSCOPY      Comment:  Dr. Britton, multiple polyps, recheck five years-three                years if more than 2 polyps are adenomatous  12/2/2015: COLONOSCOPY; N/A  3/20/2019: COLONOSCOPY; N/A      Comment:  Procedure: COLONOSCOPY;  Surgeon: Jose Britton MD;                Location: [facility];  Service: Endoscopy;  Laterality:                N/A;  No date: FOOT SURGERY  No date: HYSTERECTOMY  06/06/2013: KNEE SURGERY      Comment:  right knee tear Dr Iverson   1966: LUNG LOBECTOMY      Comment:  right middle lobectomy, due to Tb  No date: OOPHORECTOMY  No date: SHOULDER SURGERY      Comment:  francis     Current Outpatient Medications on File Prior to Visit:  aspirin (ECOTRIN) 81 MG EC tablet, Take 162 mg by mouth once daily. , Disp: , Rfl:   blood sugar diagnostic (BLOOD GLUCOSE TEST) Strp, True Metrix glucose strips check once daily, Disp: 100 each, Rfl: 3  blood-glucose meter kit, True Metrix glucometer check glucose once daily, Disp: 1 each, Rfl: 0  carboxymethylcellulose sodium 0.25 % Drop, Place 1 drop into both eyes every evening. , Disp: , Rfl:   gabapentin (NEURONTIN) 100 MG capsule, Take 1 capsule (100 mg total) by mouth nightly as needed., Disp: 7 capsule, Rfl: 1  gabapentin (NEURONTIN) 100 MG capsule, Take 200 mg by mouth every evening., Disp: , Rfl:   Lactobacillus rhamnosus GG (CULTURELLE) 15 billion cell capsule, Take 1 capsule by mouth once daily., Disp: , Rfl:   lancets Misc, True Metrix lancets check once daily, Disp: 100 each, Rfl: 3  losartan  (COZAAR) 100 MG tablet, Take 1 tablet (100 mg total) by mouth once daily., Disp: 90 tablet, Rfl: 3  magnesium 250 mg Tab, Take 1 tablet by mouth once daily., Disp: , Rfl:   miconazole (MICOTIN) 2 % vaginal cream, Place 1 applicator vaginally every evening., Disp: , Rfl:   peg 400-hypromellose-glycerin (DRY EYE RELIEF) 1-0.2-0.2 % Drop, Apply 1 drop to eye 2 (two) times daily as needed., Disp: , Rfl:   saliva substitute combo no.9 (BIOTENE DRY MOUTH ORAL RINSE MM), 1 Dose by Mucous Membrane route once daily., Disp: , Rfl:   (DISCONTINUED) metFORMIN (GLUCOPHAGE-XR) 500 MG XR 24hr tablet, Take 1 tablet (500 mg total) by mouth once daily. Take one every morning for one week then increase to one twice a day thereafter (Patient taking differently: Take 500 mg by mouth once daily. ), Disp: 30 tablet, Rfl: 5  ALPRAZolam (XANAX) 0.25 MG tablet, Take 1 tablet (0.25 mg total) by mouth 3 (three) times daily as needed for Anxiety. (Patient not taking: Reported on 5/15/2020), Disp: 8 tablet, Rfl: 0    No current facility-administered medications on file prior to visit.         Review of Systems   Constitutional: Negative for chills, diaphoresis and fever.   Gastrointestinal: Positive for abdominal pain (Some symptom of dysphagia while lying in bed at night but she does not have the sensation of heartburn) and nausea. Negative for anal bleeding, blood in stool, constipation, diarrhea and vomiting.   Endocrine: Negative for polydipsia and polyuria.   Genitourinary: Positive for dysuria, frequency and urgency. Negative for hematuria.   Skin: Negative for color change and rash.       Objective:      Physical Exam   Constitutional: She appears well-developed and well-nourished. No distress.   Good blood pressure control  Afebrile  Normal weight with a BMI of 26.3 she is down 2 lb from her May 1, 2020 visit   Cardiovascular: Normal rate, regular rhythm and normal heart sounds. Exam reveals no gallop and no friction rub.   No murmur  heard.  Pulmonary/Chest: Effort normal and breath sounds normal. No stridor. No respiratory distress. She has no wheezes. She has no rales. She exhibits no tenderness.   Abdominal: Soft. Normal appearance. Bowel sounds are decreased. There is no hepatosplenomegaly. There is tenderness in the epigastric area, suprapubic area and left lower quadrant. There is no rigidity, no rebound, no guarding, no CVA tenderness, no tenderness at McBurney's point and negative Rashid's sign. No hernia.   Skin: She is not diaphoretic.   Nursing note and vitals reviewed.      Assessment:       1. UTI symptoms    2. New onset type 2 diabetes mellitus    3. Acute cystitis with hematuria    4. Diverticulitis    5. JEFF (acute kidney injury)        Plan:       1. UTI symptoms  Point of care urine is suspicious for urinary tract infection with 2+ leukocytes and presence of blood but a negative nitrate.  A culture is being done  - Urine culture; Future  - POCT URINE DIPSTICK WITHOUT MICROSCOPE  - Urine culture    2. New onset type 2 diabetes mellitus  Suspect her symptoms may have been triggered by the metformin.  We will discontinue the metformin and put her on low-dose Januvia and see if her symptoms improved.  She is scheduled for upper and lower endoscopies on the 20th as noted above  - SITagliptin (JANUVIA) 25 MG Tab; Take 1 tablet (25 mg total) by mouth once daily.  Dispense: 30 tablet; Refill: 5    3. Acute cystitis with hematuria  See above  - amoxicillin-clavulanate 500-125mg (AUGMENTIN) 500-125 mg Tab; Take 1 tablet (500 mg total) by mouth 2 (two) times daily. for 7 days  Dispense: 14 tablet; Refill: 0    4. Diverticulitis  Left lower quadrant tenderness is most suspicious for mild diverticulitis although there is some mild suprapubic tenderness is well the left lower quadrant is the more impressive of the two areas  - amoxicillin-clavulanate 500-125mg (AUGMENTIN) 500-125 mg Tab; Take 1 tablet (500 mg total) by mouth 2 (two) times  daily. for 7 days  Dispense: 14 tablet; Refill: 0    5. JEFF (acute kidney injury)  BMP  Lab Results   Component Value Date     05/12/2020    K 4.4 05/12/2020     05/12/2020    CO2 27 05/12/2020    BUN 14 05/12/2020    CREATININE 0.9 05/12/2020    CALCIUM 8.9 05/12/2020    ANIONGAP 7 (L) 05/12/2020    ESTGFRAFRICA >60 05/12/2020    EGFRNONAA >60 05/12/2020

## 2020-05-17 LAB — BACTERIA UR CULT: NORMAL

## 2020-05-18 ENCOUNTER — LAB VISIT (OUTPATIENT)
Dept: PRIMARY CARE CLINIC | Facility: CLINIC | Age: 75
End: 2020-05-18
Payer: MEDICARE

## 2020-05-18 DIAGNOSIS — Z01.818 PREOP TESTING: ICD-10-CM

## 2020-05-18 PROCEDURE — U0003 INFECTIOUS AGENT DETECTION BY NUCLEIC ACID (DNA OR RNA); SEVERE ACUTE RESPIRATORY SYNDROME CORONAVIRUS 2 (SARS-COV-2) (CORONAVIRUS DISEASE [COVID-19]), AMPLIFIED PROBE TECHNIQUE, MAKING USE OF HIGH THROUGHPUT TECHNOLOGIES AS DESCRIBED BY CMS-2020-01-R: HCPCS

## 2020-05-19 ENCOUNTER — TELEPHONE (OUTPATIENT)
Dept: FAMILY MEDICINE | Facility: CLINIC | Age: 75
End: 2020-05-19

## 2020-05-19 LAB — SARS-COV-2 RNA RESP QL NAA+PROBE: NOT DETECTED

## 2020-05-19 NOTE — TELEPHONE ENCOUNTER
----- Message from Lottie Ramirez sent at 5/19/2020  8:58 AM CDT -----  Type:  Patient Returning Call    Who Called:  patient  Who Left Message for Patient:  Rebeca  Does the patient know what this is regarding?:    Best Call Back Number:  [phone]  Additional Information:

## 2020-05-20 ENCOUNTER — TELEPHONE (OUTPATIENT)
Dept: GASTROENTEROLOGY | Facility: CLINIC | Age: 75
End: 2020-05-20

## 2020-05-20 ENCOUNTER — ANESTHESIA (OUTPATIENT)
Dept: ENDOSCOPY | Facility: HOSPITAL | Age: 75
End: 2020-05-20
Payer: MEDICARE

## 2020-05-20 ENCOUNTER — HOSPITAL ENCOUNTER (OUTPATIENT)
Facility: HOSPITAL | Age: 75
Discharge: HOME | End: 2020-05-20
Attending: INTERNAL MEDICINE | Admitting: INTERNAL MEDICINE
Payer: MEDICARE

## 2020-05-20 ENCOUNTER — ANESTHESIA EVENT (OUTPATIENT)
Dept: ENDOSCOPY | Facility: HOSPITAL | Age: 75
End: 2020-05-20
Payer: MEDICARE

## 2020-05-20 DIAGNOSIS — D50.9 IRON DEFICIENCY ANEMIA: ICD-10-CM

## 2020-05-20 DIAGNOSIS — K44.9 HIATAL HERNIA: ICD-10-CM

## 2020-05-20 DIAGNOSIS — K64.8 INTERNAL HEMORRHOIDS: ICD-10-CM

## 2020-05-20 DIAGNOSIS — K31.7 GASTRIC POLYPS: ICD-10-CM

## 2020-05-20 DIAGNOSIS — K29.70 GASTRITIS, PRESENCE OF BLEEDING UNSPECIFIED, UNSPECIFIED CHRONICITY, UNSPECIFIED GASTRITIS TYPE: Primary | ICD-10-CM

## 2020-05-20 DIAGNOSIS — K63.5 POLYP OF COLON, UNSPECIFIED PART OF COLON, UNSPECIFIED TYPE: ICD-10-CM

## 2020-05-20 DIAGNOSIS — K57.90 DIVERTICULOSIS: ICD-10-CM

## 2020-05-20 PROCEDURE — 45385 COLONOSCOPY W/LESION REMOVAL: CPT | Performed by: INTERNAL MEDICINE

## 2020-05-20 PROCEDURE — D9220A PRA ANESTHESIA: Mod: ANES,,, | Performed by: ANESTHESIOLOGY

## 2020-05-20 PROCEDURE — 45385 PR COLONOSCOPY,REMV LESN,SNARE: ICD-10-PCS | Mod: ,,, | Performed by: INTERNAL MEDICINE

## 2020-05-20 PROCEDURE — 88305 TISSUE EXAM BY PATHOLOGIST: ICD-10-PCS | Mod: 26,,, | Performed by: PATHOLOGY

## 2020-05-20 PROCEDURE — 25000003 PHARM REV CODE 250: Performed by: INTERNAL MEDICINE

## 2020-05-20 PROCEDURE — 43239 EGD BIOPSY SINGLE/MULTIPLE: CPT | Mod: 59,,, | Performed by: INTERNAL MEDICINE

## 2020-05-20 PROCEDURE — 45380 COLONOSCOPY AND BIOPSY: CPT | Mod: 59,,, | Performed by: INTERNAL MEDICINE

## 2020-05-20 PROCEDURE — 45380 COLONOSCOPY AND BIOPSY: CPT | Performed by: INTERNAL MEDICINE

## 2020-05-20 PROCEDURE — 43251 PR EGD, FLEX, W/REMOVAL, TUMOR/POLYP/LESION(S), SNARE: ICD-10-PCS | Mod: 51,,, | Performed by: INTERNAL MEDICINE

## 2020-05-20 PROCEDURE — 43251 EGD REMOVE LESION SNARE: CPT | Mod: 51,,, | Performed by: INTERNAL MEDICINE

## 2020-05-20 PROCEDURE — 88305 TISSUE EXAM BY PATHOLOGIST: CPT | Mod: 26,,, | Performed by: PATHOLOGY

## 2020-05-20 PROCEDURE — 88305 TISSUE EXAM BY PATHOLOGIST: CPT | Performed by: PATHOLOGY

## 2020-05-20 PROCEDURE — 43239 PR EGD, FLEX, W/BIOPSY, SGL/MULTI: ICD-10-PCS | Mod: 59,,, | Performed by: INTERNAL MEDICINE

## 2020-05-20 PROCEDURE — D9220A PRA ANESTHESIA: Mod: CRNA,,, | Performed by: NURSE ANESTHETIST, CERTIFIED REGISTERED

## 2020-05-20 PROCEDURE — 25000003 PHARM REV CODE 250: Performed by: NURSE ANESTHETIST, CERTIFIED REGISTERED

## 2020-05-20 PROCEDURE — 43251 EGD REMOVE LESION SNARE: CPT | Performed by: INTERNAL MEDICINE

## 2020-05-20 PROCEDURE — 37000009 HC ANESTHESIA EA ADD 15 MINS: Performed by: INTERNAL MEDICINE

## 2020-05-20 PROCEDURE — 45380 PR COLONOSCOPY,BIOPSY: ICD-10-PCS | Mod: 59,,, | Performed by: INTERNAL MEDICINE

## 2020-05-20 PROCEDURE — D9220A PRA ANESTHESIA: ICD-10-PCS | Mod: ANES,,, | Performed by: ANESTHESIOLOGY

## 2020-05-20 PROCEDURE — 37000008 HC ANESTHESIA 1ST 15 MINUTES: Performed by: INTERNAL MEDICINE

## 2020-05-20 PROCEDURE — 43239 EGD BIOPSY SINGLE/MULTIPLE: CPT | Performed by: INTERNAL MEDICINE

## 2020-05-20 PROCEDURE — 45385 COLONOSCOPY W/LESION REMOVAL: CPT | Mod: ,,, | Performed by: INTERNAL MEDICINE

## 2020-05-20 PROCEDURE — 63600175 PHARM REV CODE 636 W HCPCS: Performed by: NURSE ANESTHETIST, CERTIFIED REGISTERED

## 2020-05-20 PROCEDURE — D9220A PRA ANESTHESIA: ICD-10-PCS | Mod: CRNA,,, | Performed by: NURSE ANESTHETIST, CERTIFIED REGISTERED

## 2020-05-20 RX ORDER — PROPOFOL 10 MG/ML
VIAL (ML) INTRAVENOUS
Status: DISCONTINUED | OUTPATIENT
Start: 2020-05-20 | End: 2020-05-20

## 2020-05-20 RX ORDER — SODIUM CHLORIDE 9 MG/ML
INJECTION, SOLUTION INTRAVENOUS CONTINUOUS
Status: DISCONTINUED | OUTPATIENT
Start: 2020-05-20 | End: 2020-05-20 | Stop reason: HOSPADM

## 2020-05-20 RX ORDER — PANTOPRAZOLE SODIUM 40 MG/1
40 TABLET, DELAYED RELEASE ORAL DAILY
Qty: 90 TABLET | Refills: 3 | Status: SHIPPED | OUTPATIENT
Start: 2020-05-20 | End: 2020-06-02

## 2020-05-20 RX ORDER — LIDOCAINE HCL/PF 100 MG/5ML
SYRINGE (ML) INTRAVENOUS
Status: DISCONTINUED | OUTPATIENT
Start: 2020-05-20 | End: 2020-05-20

## 2020-05-20 RX ADMIN — PROPOFOL 30 MG: 10 INJECTION, EMULSION INTRAVENOUS at 11:05

## 2020-05-20 RX ADMIN — PROPOFOL 120 MG: 10 INJECTION, EMULSION INTRAVENOUS at 11:05

## 2020-05-20 RX ADMIN — LIDOCAINE HYDROCHLORIDE 100 MG: 20 INJECTION INTRAVENOUS at 11:05

## 2020-05-20 RX ADMIN — SODIUM CHLORIDE 1000 ML: 0.9 INJECTION, SOLUTION INTRAVENOUS at 10:05

## 2020-05-20 NOTE — PLAN OF CARE
Vss, heather po fluids, denies pain, ambulates easily. IV removed, catheter intact. Discharge instructions provided and states understanding. States ready to go home.  Discharged from facility with family per wheelchair.

## 2020-05-20 NOTE — [employer] PATIENT INSTRUCTIONS
Discharge Summary/Instructions after an Endoscopic Procedure  Patient Name: Erica Masterson  Patient MRN: [id number]  Patient YOB: 1945  Wednesday, May 20, 2020  Jose Coughlin MD  RESTRICTIONS:  During your procedure today, you received medications for sedation.  These   medications may affect your judgment, balance and coordination.  Therefore,   for 24 hours, you have the following restrictions:   - DO NOT drive a car, operate machinery, make legal/financial decisions,   sign important papers or drink alcohol.    ACTIVITY:  Today: no heavy lifting, straining or running due to procedural   sedation/anesthesia.  The following day: return to full activity including work.  DIET:  Eat and drink normally unless instructed otherwise.     TREATMENT FOR COMMON SIDE EFFECTS:  - Mild abdominal pain, nausea, belching, bloating or excessive gas:  rest,   eat lightly and use a heating pad.  - Sore Throat: treat with throat lozenges and/or gargle with warm salt   water.  - Because air was used during the procedure, expelling large amounts of air   from your rectum or belching is normal.  - If a bowel prep was taken, you may not have a bowel movement for 1-3 days.    This is normal.  SYMPTOMS TO WATCH FOR AND REPORT TO YOUR PHYSICIAN:  1. Abdominal pain or bloating, other than gas cramps.  2. Chest pain.  3. Back pain.  4. Signs of infection such as: chills or fever occurring within 24 hours   after the procedure.  5. Rectal bleeding, which would show as bright red, maroon, or black stools.   (A tablespoon of blood from the rectum is not serious, especially if   hemorrhoids are present.)  6. Vomiting.  7. Weakness or dizziness.  GO DIRECTLY TO THE NEAREST EMERGENCY ROOM IF YOU HAVE ANY OF THE FOLLOWING:      Difficulty breathing              Chills and/or fever over 101 F   Persistent vomiting and/or vomiting blood   Severe abdominal pain   Severe chest pain   Black, tarry stools   Bleeding- more than one  tablespoon   Any other symptom or condition that you feel may need urgent attention  Your doctor recommends these additional instructions:  If any biopsies were taken, your doctors clinic will contact you in 1 to 2   weeks with any results.  - Patient has a contact number available for emergencies.  The signs and   symptoms of potential delayed complications were discussed with the   patient.  Return to normal activities tomorrow.  Written discharge   instructions were provided to the patient.   - Resume previous diet.   - Continue present medications.   - No aspirin, ibuprofen, naproxen, or other non-steroidal anti-inflammatory   drugs.   - Await pathology results.   - Discharge patient to home (ambulatory).   - Perform a colonoscopy today.   - Follow an antireflux regimen.   - Use Protonix (pantoprazole) 40 mg PO daily.   - Return to my office after studies are complete.  For questions, problems or results please call your physician - Jose Coughlin MD at Work:  [phone].  [facility], EMERGENCY ROOM PHONE NUMBER: [phone]  IF A COMPLICATION OR EMERGENCY SITUATION ARISES AND YOU ARE UNABLE TO REACH   YOUR PHYSICIAN - GO DIRECTLY TO THE EMERGENCY ROOM.  Jose Coughlin MD  5/20/2020 11:11:28 AM  This report has been verified and signed electronically.  [employer]

## 2020-05-20 NOTE — TELEPHONE ENCOUNTER
----- Message from Janice Cordova sent at 5/20/2020  2:25 PM CDT -----  Contact: Pt  Type: Needs Medical Advice  Who Called:  Erica  Nicolás Call Back Number: [phone]  Additional Information: Patient is calling to speak with a nurse to get another test done.Please call back and advise.

## 2020-05-20 NOTE — [employer] PATIENT INSTRUCTIONS
Discharge Summary/Instructions after an Endoscopic Procedure  Patient Name: Erica Masterson  Patient MRN: [id number]  Patient YOB: 1945  Wednesday, May 20, 2020  Jose Coughlin MD  RESTRICTIONS:  During your procedure today, you received medications for sedation.  These   medications may affect your judgment, balance and coordination.  Therefore,   for 24 hours, you have the following restrictions:   - DO NOT drive a car, operate machinery, make legal/financial decisions,   sign important papers or drink alcohol.    ACTIVITY:  Today: no heavy lifting, straining or running due to procedural   sedation/anesthesia.  The following day: return to full activity including work.  DIET:  Eat and drink normally unless instructed otherwise.     TREATMENT FOR COMMON SIDE EFFECTS:  - Mild abdominal pain, nausea, belching, bloating or excessive gas:  rest,   eat lightly and use a heating pad.  - Sore Throat: treat with throat lozenges and/or gargle with warm salt   water.  - Because air was used during the procedure, expelling large amounts of air   from your rectum or belching is normal.  - If a bowel prep was taken, you may not have a bowel movement for 1-3 days.    This is normal.  SYMPTOMS TO WATCH FOR AND REPORT TO YOUR PHYSICIAN:  1. Abdominal pain or bloating, other than gas cramps.  2. Chest pain.  3. Back pain.  4. Signs of infection such as: chills or fever occurring within 24 hours   after the procedure.  5. Rectal bleeding, which would show as bright red, maroon, or black stools.   (A tablespoon of blood from the rectum is not serious, especially if   hemorrhoids are present.)  6. Vomiting.  7. Weakness or dizziness.  GO DIRECTLY TO THE NEAREST EMERGENCY ROOM IF YOU HAVE ANY OF THE FOLLOWING:      Difficulty breathing              Chills and/or fever over 101 F   Persistent vomiting and/or vomiting blood   Severe abdominal pain   Severe chest pain   Black, tarry stools   Bleeding- more than one  tablespoon   Any other symptom or condition that you feel may need urgent attention  Your doctor recommends these additional instructions:  If any biopsies were taken, your doctors clinic will contact you in 1 to 2   weeks with any results.  - Patient has a contact number available for emergencies.  The signs and   symptoms of potential delayed complications were discussed with the   patient.  Return to normal activities tomorrow.  Written discharge   instructions were provided to the patient.   - High fiber diet.   - Continue present medications.   - Await pathology results.   - Repeat colonoscopy in 3 years for surveillance.   - Discharge patient to home (ambulatory).   - Return to my office after studies are complete.   - To visualize the small bowel, perform video capsule endoscopy at   appointment to be scheduled.  For questions, problems or results please call your physician - Jose Coughlin MD at Work:  [phone].  [facility], EMERGENCY ROOM PHONE NUMBER: [phone]  IF A COMPLICATION OR EMERGENCY SITUATION ARISES AND YOU ARE UNABLE TO REACH   YOUR PHYSICIAN - GO DIRECTLY TO THE EMERGENCY ROOM.  Jose Coughlin MD  5/20/2020 11:37:28 AM  This report has been verified and signed electronically.  [employer]

## 2020-05-20 NOTE — TRANSFER OF CARE
"Anesthesia Transfer of Care Note    Patient: Erica Masterson    Procedure(s) Performed: Procedure(s) (LRB):  EGD (ESOPHAGOGASTRODUODENOSCOPY) (N/A)  COLONOSCOPY (N/A)    Patient location: PACU    Anesthesia Type: general    Transport from OR: Transported from OR on 2-3 L/min O2 by NC with adequate spontaneous ventilation    Post pain: adequate analgesia    Post assessment: no apparent anesthetic complications and tolerated procedure well    Post vital signs: stable    Level of consciousness: awake, alert and oriented    Nausea/Vomiting: no nausea/vomiting    Complications: none    Transfer of care protocol was followed      Last vitals:   Visit Vitals  BP (!) 146/67 (BP Location: Left arm, Patient Position: Lying)   Pulse 68   Temp 36.6 °C (97.9 °F) (Skin)   Resp 16   Ht 5' 5.75" (1.67 m)   Wt 73.5 kg (162 lb)   SpO2 99%   Breastfeeding? No   BMI 26.35 kg/m²     "

## 2020-05-20 NOTE — DISCHARGE INSTRUCTIONS
Diverticulosis    Diverticulosis means that small pouches have formed in the wall of your large intestine (colon). Most often, this problem causes no symptoms and is common as people age. But the pouches in the colon are at risk of becoming infected. When this happens, the condition is called diverticulitis. Although most people with diverticulosis never develop diverticulitis, it is still not uncommon. Rectal bleeding can also occur and in less common situations, a type of colon inflammation called colitis.  While most people do not have symptoms, some people with diverticulosis may have:  · Abdominal cramps and pain  · Bloating  · Constipation  · Change in bowel habits  Causes  The exact cause of diverticulosis (and diverticulitis) has not been proved, but a few things are associated with the condition:  · Low-fiber diet  · Constipation  · Lack of exercise  Your healthcare provider will talk with you about how to manage your condition. Diet changes may be all that are needed to help control diverticulosis and prevent progression to diverticulitis. If you develop diverticulitis, you will likely need other treatments.  Home care  You may be told to take fiber supplements daily. Fiber adds bulk to the stool so that it passes through the colon more easily. Stool softeners may be recommended. You may also be given medications for pain relief. Be sure to take all medications as directed.  In the past, people were told to avoid corn, nuts, and seeds. This is no longer necessary.  Follow these guidelines when caring for yourself at home:  · Eat unprocessed foods that are high in fiber. Whole grains, fruits, and vegetables are good choices.  · Drink 6 to 8 glasses of water every day unless your healthcare provider has you limit how much fluid you should have.  · Watch for changes in your bowel movements. Tell your provider if you notice any changes.  · Begin an exercise program. Ask your provider how to get started.  Generally, walking is the best.  · Get plenty of rest and sleep.  Follow-up care  Follow up with your healthcare provider, or as advised. Regular visits may be needed to check on your health. Sometimes special procedures such as colonoscopy, are needed after an episode of diverticulitis or blooding. Be sure to keep all your appointments.  If a stool sample was taken, or cultures were done, you should be told if they are positive, or if your treatment needs to be changed. You can call as directed for the results.  If X-rays were done, a radiologist will look at them. You will be told if there is a change in your treatment.  If antibiotics were prescribed, be sure to finish them all.  When to seek medical advice  Call your healthcare provider right away if any of these occur:  · Fever of 100.4°F (38°C) or higher, or as directed by your healthcare provider  · Severe cramps in the lower left side of the abdomen or pain that is getting worse  · Tenderness in the lower left side of the abdomen or worsening pain throughout the abdomen  · Diarrhea or constipation that doesn't get better within 24 hours  · Nausea and vomiting  · Bleeding from the rectum  Call 911  Call emergency services if any of the following occur:  · Trouble breathing  · Confusion  · Very drowsy or trouble awakening  · Fainting or loss of consciousness  · Rapid heart rate  · Chest pain  Date Last Reviewed: 12/30/2015 © 2000-2017 [employer]. [address] [place] [address]. All rights reserved. This information is not intended as a substitute for professional medical care. Always follow your healthcare professional's instructions.        What Is a Hiatal Hernia?    Hiatal hernia is when the area where the stomach and esophagus meet bulges up through the diaphragm into the chest cavity. In some cases, part of the stomach may bulge above the diaphragm. Stomach acid may move up into the esophagus and cause symptoms. The symptoms are  often blamed on gastroesophageal reflux disease (GERD). You may only know about the hernia when it shows up on an X-ray taken for other reasons.   What you may feel  The hiatus is a normal hole in the diaphragm. The esophagus passes through this hole and leads to the stomach. In some cases, part of the stomach may bulge above the diaphragm. This bulge is called a hernia. Stomach acid may move up into the esophagus and cause symptoms.  When you eat, the muscle at the hiatus relaxes to allow food to pass into the stomach. It tightens again to keep food and digestive acids in the stomach.  Many people with hiatal hernias have mild symptoms. You may notice the following GERD symptoms:  · Heartburn or other chest discomfort  · A feeling of chest fullness after a meal  · Frequent burping  · Acid taste in the mouth  · Trouble swallowing  Treating symptoms  If you have been diagnosed with hiatal hernia, these suggestions may help improve symptoms:  · Lose excess weight. Extra weight puts pressure on the stomach and esophagus.  · Dont lie down after eating. Sit up for at least an hour after eating. Lying down after eating can increase symptoms.  · Avoid certain foods and drinks. These include fatty foods, chocolate, coffee, mint, and other foods that cause symptoms for you.  · Dont smoke or drink alcohol. These can worsen symptoms.  · Look at your medicines. Discuss your medicines with your healthcare provider. Many medicines can cause symptoms.  · Consider an antacid medicine. Ask your healthcare provider about over-the-counter and prescription medicines that may help.  · Ask about surgery, if needed. Surgery is a treatment choice for some people. Your healthcare provider can determine if surgery is an option for you.    Date Last Reviewed: 10/1/2016  © 4102-0773 [employer]. [address] [place] [address], [place] [address]. All rights reserved. This information is not intended as a substitute for professional  medical care. Always follow your healthcare professional's instructions.        Gastritis (Adult)    Gastritis is inflammation and irritation of the stomach lining. It can be present for a short time (acute) or be long lasting (chronic). Gastritis is often caused by infection with bacteria called H pylori. More than a third of people in the [place] have this bacteria in their bodies. In many cases, H pylori causes no problems or symptoms. In some people, though, the infection irritates the stomach lining and causes gastritis. Other causes of stomach irritation include drinking alcohol or taking pain-relieving medicines called NSAIDs (such as aspirin or ibuprofen).   Symptoms of gastritis can include:  · Abdominal pain or bloating  · Loss of appetite  · Nausea or vomiting  · Vomiting blood or having black stools  · Feeling more tired than usual  An inflamed and irritated stomach lining is more likely to develop a sore called an ulcer. To help prevent this, gastritis should be treated.  Home care  If needed, medicines may be prescribed. If you have H pylori infection, treating it will likely relieve your symptoms. Other changes can help reduce stomach irritation and help it heal.  · If you have been prescribed medicines for H pylori infection, take them as directed. Take all of the medicine until it is finished or your healthcare provider tells you to stop, even if you feel better.  · Your healthcare provider may recommend avoiding NSAIDs. If you take daily aspirin for your heart or other medical reasons, do not stop without talking to your healthcare provider first.  · Avoid drinking alcohol.  · Stop smoking. Smoking can irritate the stomach and delay healing. As much as possible, stay away from second hand smoke.  Follow-up care  Follow up with your healthcare provider, or as advised by our staff. Testing may be needed to check for inflammation or an ulcer.  When to seek medical advice  Call your healthcare provider for  any of the following:  · Stomach pain that gets worse or moves to the lower right abdomen (appendix area)  · Chest pain that appears or gets worse, or spreads to the back, neck, shoulder, or arm  · Frequent vomiting (cant keep down liquids)  · Blood in the stool or vomit (red or black in color)  · Feeling weak or dizzy  · Fever of 100.4ºF (38ºC) or higher, or as directed by your healthcare provider  Date Last Reviewed: 6/22/2015 © 2000-2017 [employer]. [address] [place], [place] [id number]. All rights reserved. This information is not intended as a substitute for professional medical care. Always follow your healthcare professional's instructions.

## 2020-05-21 VITALS
HEIGHT: 66 IN | SYSTOLIC BLOOD PRESSURE: 171 MMHG | TEMPERATURE: 98 F | BODY MASS INDEX: 26.03 KG/M2 | DIASTOLIC BLOOD PRESSURE: 74 MMHG | RESPIRATION RATE: 16 BRPM | OXYGEN SATURATION: 99 % | HEART RATE: 67 BPM | WEIGHT: 162 LBS

## 2020-05-21 DIAGNOSIS — Z01.818 PREOP TESTING: Primary | ICD-10-CM

## 2020-05-21 DIAGNOSIS — D50.9 IRON DEFICIENCY ANEMIA, UNSPECIFIED IRON DEFICIENCY ANEMIA TYPE: ICD-10-CM

## 2020-05-21 LAB
FINAL PATHOLOGIC DIAGNOSIS: NORMAL
GROSS: NORMAL

## 2020-05-21 NOTE — ANESTHESIA PREPROCEDURE EVALUATION
05/21/2020  Erica Masterson is a 74 y.o., female.    Pre-op Assessment    I have reviewed the Patient Summary Reports.     I have reviewed the Nursing Notes.   I have reviewed the Medications.     Review of Systems  Anesthesia Hx:  No problems with previous Anesthesia    Social:  Non-Smoker    Cardiovascular:   Exercise tolerance: good Hypertension, well controlled hyperlipidemia    Pulmonary:  Pulmonary Normal    Renal/:   Chronic Renal Disease    Hepatic/GI:   Bowel Prep. GERD, poorly controlled    Musculoskeletal:   Arthritis     Neurological:   Neuromuscular Disease,    Endocrine:   Diabetes    Psych:   Psychiatric History depression          Physical Exam  General:  Well nourished    Airway/Jaw/Neck:  Airway Findings: Mouth Opening: Normal Tongue: Normal  General Airway Assessment: Adult, Good  Mallampati: I       Chest/Lungs:  Chest/Lungs Findings: Clear to auscultation, Normal Respiratory Rate     Heart/Vascular:  Heart Findings: Rate: Normal  Rhythm: Regular Rhythm  Sounds: Normal        Mental Status:  Mental Status Findings:  Alert and Oriented, Cooperative         Anesthesia Plan  Type of Anesthesia, risks & benefits discussed:  Anesthesia Type:  general  Patient's Preference:   Intra-op Monitoring Plan: standard ASA monitors  Intra-op Monitoring Plan Comments:   Post Op Pain Control Plan:   Post Op Pain Control Plan Comments:   Induction:   IV  Beta Blocker:  Patient is not currently on a Beta-Blocker (No further documentation required).       Informed Consent: Patient understands risks and agrees with Anesthesia plan.  Questions answered. Anesthesia consent signed with patient.  ASA Score: 2     Day of Surgery Review of History & Physical: I have interviewed and examined the patient. I have reviewed the patient's H&P dated:  There are no significant changes.          Ready For Surgery From  Anesthesia Perspective.

## 2020-05-21 NOTE — ANESTHESIA POSTPROCEDURE EVALUATION
Anesthesia Post Evaluation    Patient: Erica Masterson    Procedure(s) Performed: Procedure(s) (LRB):  EGD (ESOPHAGOGASTRODUODENOSCOPY) (N/A)  COLONOSCOPY (N/A)    Final Anesthesia Type: general    Patient location during evaluation: PACU  Patient participation: Yes- Able to Participate  Level of consciousness: awake and alert and oriented  Post-procedure vital signs: reviewed and stable  Pain management: adequate  Airway patency: patent    PONV status at discharge: No PONV  Anesthetic complications: no      Cardiovascular status: blood pressure returned to baseline  Respiratory status: unassisted, spontaneous ventilation and room air  Hydration status: euvolemic  Follow-up not needed.          Vitals Value Taken Time   /74 5/20/2020 12:10 PM   Temp 36.6 °C (97.9 °F) 5/20/2020 11:50 AM   Pulse 67 5/20/2020 12:10 PM   Resp 16 5/20/2020 12:10 PM   SpO2 99 % 5/20/2020 12:10 PM         Event Time     Out of Recovery 12:26:16          Pain/Marco Antonio Score: Marco Antonio Score: 10 (5/20/2020 12:10 PM)

## 2020-05-25 ENCOUNTER — TELEPHONE (OUTPATIENT)
Dept: OPHTHALMOLOGY | Facility: CLINIC | Age: 75
End: 2020-05-25

## 2020-05-25 NOTE — TELEPHONE ENCOUNTER
Called pt to go over details of appointment as well as preferred location in [place]. Pt verbally confirmed appointment for June 15th at 10 in [place]. ----- Message from Shannan Leroy sent at 5/25/2020  7:14 AM CDT -----  Contact: self  Patient called this morning thinking she is going to see someone in [place], she reschedule with Dr Reynoso but wants to know exactly what he will be doing at her appt.  Call back at [phone] (home).  Thanks

## 2020-05-28 ENCOUNTER — TELEPHONE (OUTPATIENT)
Dept: GASTROENTEROLOGY | Facility: CLINIC | Age: 75
End: 2020-05-28

## 2020-05-28 NOTE — TELEPHONE ENCOUNTER
Patient states still having abd pain nausea vomiting diarrhea mostly evening night she is taking the protonix 40mg qd not helping much please advise.

## 2020-05-28 NOTE — TELEPHONE ENCOUNTER
----- Message from Maci Conley sent at 5/28/2020  8:04 AM CDT -----  Contact: PT  Type: Needs Medical Advice    Who Called:  Pt  Best Call Back Number: [phone]  Additional Information: Requesting a call back regarding pt procedure. Pt stated that she is till having discomfort in the stomach and nausea   Please Advise ---Thank you

## 2020-05-29 ENCOUNTER — HOSPITAL ENCOUNTER (OUTPATIENT)
Dept: RADIOLOGY | Facility: CLINIC | Age: 75
Discharge: HOME | End: 2020-05-29
Attending: FAMILY MEDICINE
Payer: MEDICARE

## 2020-05-29 DIAGNOSIS — Z12.31 VISIT FOR SCREENING MAMMOGRAM: ICD-10-CM

## 2020-05-29 DIAGNOSIS — R92.1 CALCIFICATION OF RIGHT BREAST ON MAMMOGRAPHY: Primary | ICD-10-CM

## 2020-05-29 PROCEDURE — 77067 SCR MAMMO BI INCL CAD: CPT | Mod: TC,PO

## 2020-05-29 PROCEDURE — 77063 BREAST TOMOSYNTHESIS BI: CPT | Mod: 26,,, | Performed by: RADIOLOGY

## 2020-05-29 PROCEDURE — 77063 MAMMO DIGITAL SCREENING BILAT WITH TOMOSYNTHESIS_CAD: ICD-10-PCS | Mod: 26,,, | Performed by: RADIOLOGY

## 2020-05-29 PROCEDURE — 77067 SCR MAMMO BI INCL CAD: CPT | Mod: 26,,, | Performed by: RADIOLOGY

## 2020-05-29 PROCEDURE — 77067 MAMMO DIGITAL SCREENING BILAT WITH TOMOSYNTHESIS_CAD: ICD-10-PCS | Mod: 26,,, | Performed by: RADIOLOGY

## 2020-05-29 RX ORDER — DICYCLOMINE HYDROCHLORIDE 20 MG/1
20 TABLET ORAL EVERY 6 HOURS
Qty: 360 TABLET | Refills: 3 | Status: SHIPPED | OUTPATIENT
Start: 2020-05-29 | End: 2020-06-02

## 2020-05-29 NOTE — TELEPHONE ENCOUNTER
----- Message from Ayah Connolly sent at 5/29/2020  8:03 AM CDT -----  Contact: Erica pt  Type: Needs Medical Advice  Who Called:  Erica  Symptoms (please be specific):  Pt is still having nausea and hurting in the upper part of her stomach  How long has patient had these symptoms:  ongoing  Pharmacy name and phone #:    [id number] - [place], [place] - [address]  [place] [address]  Phone: [phone] Fax: [phone]      Best Call Back Number: [phone] or [phone]  Additional Information: Pls call pt regarding her symptoms

## 2020-06-01 ENCOUNTER — OFFICE VISIT (OUTPATIENT)
Dept: GASTROENTEROLOGY | Facility: CLINIC | Age: 75
End: 2020-06-01
Payer: MEDICARE

## 2020-06-01 ENCOUNTER — TELEPHONE (OUTPATIENT)
Dept: FAMILY MEDICINE | Facility: CLINIC | Age: 75
End: 2020-06-01

## 2020-06-01 ENCOUNTER — TELEPHONE (OUTPATIENT)
Dept: GASTROENTEROLOGY | Facility: CLINIC | Age: 75
End: 2020-06-01

## 2020-06-01 VITALS
WEIGHT: 160 LBS | SYSTOLIC BLOOD PRESSURE: 126 MMHG | DIASTOLIC BLOOD PRESSURE: 60 MMHG | HEART RATE: 72 BPM | BODY MASS INDEX: 26.66 KG/M2 | HEIGHT: 65 IN

## 2020-06-01 DIAGNOSIS — R11.0 NAUSEA: ICD-10-CM

## 2020-06-01 DIAGNOSIS — D50.9 IRON DEFICIENCY ANEMIA, UNSPECIFIED IRON DEFICIENCY ANEMIA TYPE: ICD-10-CM

## 2020-06-01 DIAGNOSIS — Z86.010 HISTORY OF COLON POLYPS: ICD-10-CM

## 2020-06-01 DIAGNOSIS — R10.13 EPIGASTRIC PAIN: Primary | ICD-10-CM

## 2020-06-01 DIAGNOSIS — K29.70 GASTRITIS WITHOUT BLEEDING, UNSPECIFIED CHRONICITY, UNSPECIFIED GASTRITIS TYPE: ICD-10-CM

## 2020-06-01 DIAGNOSIS — R92.8 ABNORMAL MAMMOGRAM OF RIGHT BREAST: Primary | ICD-10-CM

## 2020-06-01 DIAGNOSIS — K59.09 CHRONIC CONSTIPATION: ICD-10-CM

## 2020-06-01 DIAGNOSIS — K21.9 GERD WITHOUT ESOPHAGITIS: ICD-10-CM

## 2020-06-01 PROCEDURE — 3074F PR MOST RECENT SYSTOLIC BLOOD PRESSURE < 130 MM HG: ICD-10-PCS | Mod: CPTII,S$GLB,, | Performed by: NURSE PRACTITIONER

## 2020-06-01 PROCEDURE — 3074F SYST BP LT 130 MM HG: CPT | Mod: CPTII,S$GLB,, | Performed by: NURSE PRACTITIONER

## 2020-06-01 PROCEDURE — 1125F PR PAIN SEVERITY QUANTIFIED, PAIN PRESENT: ICD-10-PCS | Mod: S$GLB,,, | Performed by: NURSE PRACTITIONER

## 2020-06-01 PROCEDURE — 3078F PR MOST RECENT DIASTOLIC BLOOD PRESSURE < 80 MM HG: ICD-10-PCS | Mod: CPTII,S$GLB,, | Performed by: NURSE PRACTITIONER

## 2020-06-01 PROCEDURE — 99214 PR OFFICE/OUTPT VISIT, EST, LEVL IV, 30-39 MIN: ICD-10-PCS | Mod: S$GLB,,, | Performed by: NURSE PRACTITIONER

## 2020-06-01 PROCEDURE — 99999 PR PBB SHADOW E&M-EST. PATIENT-LVL V: CPT | Mod: PBBFAC,,, | Performed by: NURSE PRACTITIONER

## 2020-06-01 PROCEDURE — 3078F DIAST BP <80 MM HG: CPT | Mod: CPTII,S$GLB,, | Performed by: NURSE PRACTITIONER

## 2020-06-01 PROCEDURE — 99999 PR PBB SHADOW E&M-EST. PATIENT-LVL V: ICD-10-PCS | Mod: PBBFAC,,, | Performed by: NURSE PRACTITIONER

## 2020-06-01 PROCEDURE — 1101F PR PT FALLS ASSESS DOC 0-1 FALLS W/OUT INJ PAST YR: ICD-10-PCS | Mod: CPTII,S$GLB,, | Performed by: NURSE PRACTITIONER

## 2020-06-01 PROCEDURE — 1159F PR MEDICATION LIST DOCUMENTED IN MEDICAL RECORD: ICD-10-PCS | Mod: S$GLB,,, | Performed by: NURSE PRACTITIONER

## 2020-06-01 PROCEDURE — 1125F AMNT PAIN NOTED PAIN PRSNT: CPT | Mod: S$GLB,,, | Performed by: NURSE PRACTITIONER

## 2020-06-01 PROCEDURE — 1159F MED LIST DOCD IN RCRD: CPT | Mod: S$GLB,,, | Performed by: NURSE PRACTITIONER

## 2020-06-01 PROCEDURE — 1101F PT FALLS ASSESS-DOCD LE1/YR: CPT | Mod: CPTII,S$GLB,, | Performed by: NURSE PRACTITIONER

## 2020-06-01 PROCEDURE — 99214 OFFICE O/P EST MOD 30 MIN: CPT | Mod: S$GLB,,, | Performed by: NURSE PRACTITIONER

## 2020-06-01 RX ORDER — SUCRALFATE 1 G/1
1 TABLET ORAL 4 TIMES DAILY
Qty: 40 TABLET | Refills: 0 | Status: SHIPPED | OUTPATIENT
Start: 2020-06-01 | End: 2020-06-11

## 2020-06-01 RX ORDER — FAMOTIDINE 40 MG/1
40 TABLET, FILM COATED ORAL NIGHTLY
Qty: 30 TABLET | Refills: 2 | Status: SHIPPED | OUTPATIENT
Start: 2020-06-01 | End: 2020-06-30

## 2020-06-01 NOTE — PATIENT INSTRUCTIONS
Epigastric Pain (Uncertain Cause)     Epigastric pain can be a sign of disease in the upper abdomen. Common causes include:  · Acid reflux (stomach acid flowing up into the esophagus)  · Gastritis (irritation of the stomach lining)  · Peptic Ulcer Disease  · Inflammation of the pancreas  · Gallstone  · Infection in the gallbladder  Pain may be dull or burning. It may spread upward to the chest or to the back. There may be other symptoms such as belching, bloating, cramps or hunger pains. There may be weight loss or poor appetite, nausea or vomiting.  Since the diagnosis of your pain is not certain yet, further tests may sometimes be needed. Sometimes the doctor will treat you for the most likely condition to see if there is improvement before doing further tests.  Home care  Medicines  · Antacids help neutralize the normal acids in your stomach. Examples are Maalox, Mylanta, Rolaids, and Tums. If you dont like the liquid, you can also try a chewable one. You may find one works better than another for you. Overuse can cause diarrhea or constipation.  · Acid blockers (H2 blockers) decrease acid production. Examples are cimetidine (Tagamet), famotidine (Pepcid) and ranitidine (Zantac).  · Acid inhibitors (PPIs) decrease acid production in a different way than the blockers. You may find they work better, but can take a little longer to take effect.  Examples are omeprazole (Prilosec), lansoprazole (Prevacid), pantoprazole (Protonix), rabeprazole (Aciphex), and esomeprazole (Nexium).  · Take an antacid 30-60 minutes after eating and at bedtime, but not at the same time as an acid blocker.  · Try not to take NSAIDs. Aspirin may also cause problems, but if taking it for your heart or other medical reasons, talk to your doctor before stopping it; you do not want to cause a worse problem, like a heart attack or stroke.  Diet  · If certain foods seem to cause your spasm, try to avoid them.   · Eat slowly and chew food well  before swallowing. Symptoms of gastritis can be worsened by certain foods. Limit or avoid fatty, fried, and spicy foods, as well as coffee, chocolate, mint, and foods with high acid content such as tomatoes and citrus fruit and juices (orange, grapefruit, lemon).  · Avoid alcohol, caffeine, and tobacco, which can delay healing and worsen your problem.  · Try eating smaller meals with snacks in between  Follow-up care  Follow up with your healthcare provider or as advised.  When to seek medical advice  Call your healthcare provider right away if any of the following occur:  · Stomach pain worsens or moves to the right lower part of the abdomen  · Chest pain appears, or if it worsens or spreads to the chest, back, neck, shoulder, or arm  · Frequent vomiting (cant keep down liquids)  · Blood in the stool or vomit (red or black color)  · Feeling weak or dizzy, fainting, or having trouble breathing  · Fever of 100.4ºF (38ºC) or higher, or as directed by your healthcare provider  · Abdominal swelling  Date Last Reviewed: 9/25/2015  © 5974-4047 [employer]. [address] [place] [address]. All rights reserved. This information is not intended as a substitute for professional medical care. Always follow your healthcare professional's instructions.

## 2020-06-01 NOTE — TELEPHONE ENCOUNTER
----- Message from Radha Espino sent at 6/1/2020 12:20 PM CDT -----  Md has diagnostic mammo orders in [facility] for pt , US breast limited of which ever breast is to be tested needs to put the US orders in . Also checking to see if the patient has been contacted that she needs this follow mammo . Please let me know and I will call patient to set appointments. Thanking you in advance.    Radha Espino     [phone]

## 2020-06-01 NOTE — TELEPHONE ENCOUNTER
Patient states her stomach is still hurting she has tried the Bentyl all weekend is its getting worse patient given appointment today with NP also instructed to go to the ER if needed to she states they send her home and tell her to see GI.

## 2020-06-01 NOTE — PROGRESS NOTES
Subjective:       Patient ID: Erica Masterson is a 74 y.o. female, Body mass index is 26.63 kg/m².    Chief Complaint: Nausea and Abdominal Pain      Patient is new to me. Established patient of Dr. Britton.    Abdominal Pain   This is a new problem. The current episode started more than 1 month ago (Started ~ 3/2020). The onset quality is gradual. The problem occurs constantly (worse at night). The problem has been gradually worsening. The pain is located in the epigastric region. The quality of the pain is burning. The abdominal pain does not radiate. Associated symptoms include belching, constipation (bowel movements are once per day; recently started Linzess 145 mcg daily-effective) and nausea (associated with abdominal pain). Pertinent negatives include no anorexia, diarrhea, dysuria, fever, flatus, hematochezia, melena, vomiting or weight loss. Nothing aggravates the pain. The pain is relieved by nothing. She has tried proton pump inhibitors (Currently: Protonix 40 mg once daily; Bentyl 20 mg QID- ineffective; Bentyl and Linzess ineffective for abdominal pain) for the symptoms. Prior diagnostic workup includes lower endoscopy and upper endoscopy. Her past medical history is significant for abdominal surgery (hx of hysterectomy) and GERD (denies heartburn taking Protonix; hx of NEMO- scheduled for video capsule study). There is no history of colon cancer, Crohn's disease, gallstones, irritable bowel syndrome, pancreatitis, PUD or ulcerative colitis. Patient's medical history does not include kidney stones.     Review of Systems   Constitutional: Negative for appetite change, fever, unexpected weight change and weight loss.   HENT: Negative for trouble swallowing.    Respiratory: Negative for cough and shortness of breath.    Cardiovascular: Negative for chest pain.   Gastrointestinal: Positive for abdominal pain, constipation (bowel movements are once per day; recently started Linzess 145 mcg daily-effective) and  nausea (associated with abdominal pain). Negative for anorexia, blood in stool, diarrhea, flatus, hematochezia, melena and vomiting.   Genitourinary: Negative for difficulty urinating and dysuria.   Musculoskeletal: Negative for gait problem.   Skin: Negative for rash.   Neurological: Positive for weakness (instructed to follow-up with PCP). Negative for speech difficulty.   Psychiatric/Behavioral: Negative for confusion.       Past Medical History:   Diagnosis Date    Allergy     Anxiety     Cataract     Colon polyp     Degenerative arthritis of knee 4/3/2012    Diverticulosis     GERD (gastroesophageal reflux disease)     Hyperlipidemia     Hypertension     Multinodular goiter 3/26/2012    Myopathy, unspecified 1/18/2010    New onset type 2 diabetes mellitus 3/24/2020    New onset type 2 diabetes mellitus 3/24/2020    Tuberculosis       Past Surgical History:   Procedure Laterality Date    BREAST BIOPSY Left 2015    benign    COLONOSCOPY  12/2/15    Dr. Britton, multiple polyps, recheck five years-three years if more than 2 polyps are adenomatous    COLONOSCOPY N/A 12/2/2015    COLONOSCOPY N/A 3/20/2019    Procedure: COLONOSCOPY;  Surgeon: Jose Britton MD;  Location: [facility];  Service: Endoscopy;  Laterality: N/A;    COLONOSCOPY N/A 5/20/2020    Dr. Britton; internal hemorrhoids; diverticulosis; polyps removed; repeat in 3 years    ESOPHAGOGASTRODUODENOSCOPY N/A 5/20/2020    Dr. Britton; small hiatal hernia; gastritis; 8 gastric polyps removed    FOOT SURGERY      HYSTERECTOMY      KNEE SURGERY  06/06/2013    right knee tear Dr Iverson     LUNG LOBECTOMY  1966    right middle lobectomy, due to Tb    OOPHORECTOMY      SHOULDER SURGERY      francis       Family History   Problem Relation Age of Onset    Colon cancer Other     Cancer Mother     Hypertension Mother     Hyperlipidemia Mother     Cancer Brother     Hypertension Father     Emphysema Father     Diabetes Son      Hypertension Son     Alcohol abuse Son     Diabetes Maternal Aunt     Alzheimer's disease Maternal Uncle     Cancer Maternal Grandmother     No Known Problems Daughter     Dementia Sister     Alzheimer's disease Sister     Breast cancer Sister 60    Obesity Paternal Uncle     Prostate cancer Other     Melanoma Neg Hx     Psoriasis Neg Hx     Lupus Neg Hx     Eczema Neg Hx     Amblyopia Neg Hx     Blindness Neg Hx     Cataracts Neg Hx     Glaucoma Neg Hx     Macular degeneration Neg Hx     Retinal detachment Neg Hx     Strabismus Neg Hx     Stroke Neg Hx     Thyroid disease Neg Hx       Wt Readings from Last 10 Encounters:   06/01/20 72.6 kg (160 lb)   05/20/20 73.5 kg (162 lb)   05/15/20 73.2 kg (161 lb 6 oz)   05/14/20 73.7 kg (162 lb 7.7 oz)   05/13/20 73.5 kg (162 lb 0.6 oz)   05/12/20 73.4 kg (161 lb 13.1 oz)   05/05/20 73.9 kg (163 lb)   05/01/20 74.1 kg (163 lb 5.8 oz)   04/30/20 74.2 kg (163 lb 9.3 oz)   04/29/20 75.8 kg (167 lb)     Lab Results   Component Value Date    WBC 6.39 05/12/2020    HGB 11.4 (L) 05/12/2020    HCT 36.3 (L) 05/12/2020    MCV 94 05/12/2020     05/12/2020     CMP  Sodium   Date Value Ref Range Status   05/12/2020 140 136 - 145 mmol/L Final     Potassium   Date Value Ref Range Status   05/12/2020 4.4 3.5 - 5.1 mmol/L Final     Chloride   Date Value Ref Range Status   05/12/2020 106 95 - 110 mmol/L Final     CO2   Date Value Ref Range Status   05/12/2020 27 23 - 29 mmol/L Final     Glucose   Date Value Ref Range Status   05/12/2020 85 70 - 110 mg/dL Final     BUN, Bld   Date Value Ref Range Status   05/12/2020 14 8 - 23 mg/dL Final     Creatinine   Date Value Ref Range Status   05/12/2020 0.9 0.5 - 1.4 mg/dL Final   06/04/2013 0.9 0.5 - 1.4 mg/dL Final     Calcium   Date Value Ref Range Status   05/12/2020 8.9 8.7 - 10.5 mg/dL Final   06/04/2013 9.7 8.7 - 10.5 mg/dL Final     Total Protein   Date Value Ref Range Status   05/12/2020 7.0 6.0 - 8.4 g/dL Final      Albumin   Date Value Ref Range Status   05/12/2020 3.6 3.5 - 5.2 g/dL Final     Total Bilirubin   Date Value Ref Range Status   05/12/2020 0.4 0.1 - 1.0 mg/dL Final     Comment:     For infants and newborns, interpretation of results should be based  on gestational age, weight and in agreement with clinical  observations.  Premature Infant recommended reference ranges:  Up to 24 hours.............<8.0 mg/dL  Up to 48 hours............<12.0 mg/dL  3-5 days..................<15.0 mg/dL  6-29 days.................<15.0 mg/dL       Alkaline Phosphatase   Date Value Ref Range Status   05/12/2020 51 (L) 55 - 135 U/L Final     AST   Date Value Ref Range Status   05/12/2020 17 10 - 40 U/L Final     ALT   Date Value Ref Range Status   05/12/2020 17 10 - 44 U/L Final     Anion Gap   Date Value Ref Range Status   05/12/2020 7 (L) 8 - 16 mmol/L Final   06/04/2013 9 5 - 15 meq/L Final     eGFR if    Date Value Ref Range Status   05/12/2020 >60 >60 mL/min/1.73 m^2 Final     eGFR if non    Date Value Ref Range Status   05/12/2020 >60 >60 mL/min/1.73 m^2 Final     Comment:     Calculation used to obtain the estimated glomerular filtration  rate (eGFR) is the CKD-EPI equation.        Lab Results   Component Value Date    AMYLASE 106 02/22/2016     Lab Results   Component Value Date    LIPASE 8 05/12/2020     Lab Results   Component Value Date    IRON 50 05/12/2020    TIBC 286 05/12/2020            Reviewed prior medical records including radiology report of X-Ray of abdomen 5/12/2020 & endoscopy history (see surgical history).     Objective:      Physical Exam   Constitutional: She is oriented to person, place, and time. Vital signs are normal. She appears well-developed and well-nourished. She is cooperative. She does not have a sickly appearance. No distress.   HENT:   Head: Normocephalic.   Right Ear: Hearing normal.   Left Ear: Hearing normal.   Nose: Nose normal.   Pt wearing mask due to  COVID concerns.   Eyes: Pupils are equal, round, and reactive to light. Conjunctivae and lids are normal.   Neck: Trachea normal and normal range of motion.   Cardiovascular: Normal rate, regular rhythm and normal heart sounds.   No murmur heard.  Pulmonary/Chest: Effort normal and breath sounds normal. No stridor. No respiratory distress. She has no wheezes.   Abdominal: Soft. Bowel sounds are normal. She exhibits no distension, no ascites and no mass. There is tenderness in the epigastric area. There is no rebound and no guarding.   Musculoskeletal: Normal range of motion.   Neurological: She is alert and oriented to person, place, and time.   Skin: Skin is warm, dry and intact. No rash noted.   Non jaundiced   Psychiatric: She has a normal mood and affect. Her speech is normal and behavior is normal.         Assessment:       1. Epigastric pain    2. Nausea    3. GERD without esophagitis    4. Gastritis without bleeding, unspecified chronicity, unspecified gastritis type    5. Iron deficiency anemia, unspecified iron deficiency anemia type    6. Chronic constipation    7. History of colon polyps           Plan:   All diagnoses and orders for this visit:    Epigastric pain & Gastritis without bleeding, unspecified chronicity, unspecified gastritis type  - CT Abdomen Pelvis With Contrast; Future; Expected date: 06/01/2020  - Start: famotidine (PEPCID) 40 MG tablet; Take 1 tablet (40 mg total) by mouth nightly.  Dispense: 30 tablet; Refill: 2  - Start: sucralfate (CARAFATE) 1 gram tablet; Take 1 tablet (1 g total) by mouth 4 (four) times daily. Do not start until after video capsule study for 10 days  Dispense: 40 tablet; Refill: 0 (pt instructed not to start until after video capsule study)  - Continue Protonix 40 mg once daily    Nausea  - CT Abdomen Pelvis With Contrast; Future; Expected date: 06/01/2020  - Start: famotidine (PEPCID) 40 MG tablet; Take 1 tablet (40 mg total) by mouth nightly.  Dispense: 30  tablet; Refill: 2  - Start: sucralfate (CARAFATE) 1 gram tablet; Take 1 tablet (1 g total) by mouth 4 (four) times daily. Do not start until after video capsule study for 10 days  Dispense: 40 tablet; Refill: 0    GERD without esophagitis    - Continue Protonix 40 mg once daily   - Take PPI in the morning 30 minutes before breakfast   - Recommend to avoid large meals, avoid eating within 3 hours of bedtime, elevate head of bed if nocturnal symptoms are present, smoking cessation (if current smoker), & weight loss (if overweight).    - Recommend minimize/avoid high-fat foods, chocolate, caffeine, citrus, alcohol, & tomato products.   - Advised to avoid/limit use of NSAID's, since they can cause GI upset, bleeding, and/or ulcers. If needed, take with food.     Iron deficiency anemia, unspecified iron deficiency anemia type   - Continue with scheduled video capsule study (on 6/4/2020)    Chronic constipation   - Continue Linzess 145 mcg once daily   - Recommend daily exercise as tolerated, adequate water intake, and high fiber diet.    - Recommend OTC fiber supplement    History of colon polyps   - Next surveillance colonoscopy due 5/2023    If no improvement in symptoms or symptoms worsen, call/follow-up at clinic or go to ER

## 2020-06-01 NOTE — TELEPHONE ENCOUNTER
----- Message from Janina Luz sent at 6/1/2020  8:08 AM CDT -----  Contact: pt  Pt states that she would like for Nurse Bran to call her this morning ,that she is still having a problem and it looks like getting worse...[phone] or [phone] cell#

## 2020-06-01 NOTE — TELEPHONE ENCOUNTER
I put in the u/s order-patient was notified. Patient at doctors office at this time. Please call her a little later.

## 2020-06-02 ENCOUNTER — LAB VISIT (OUTPATIENT)
Dept: PRIMARY CARE CLINIC | Facility: CLINIC | Age: 75
End: 2020-06-02
Payer: MEDICARE

## 2020-06-02 ENCOUNTER — OFFICE VISIT (OUTPATIENT)
Dept: NEUROLOGY | Facility: CLINIC | Age: 75
End: 2020-06-02
Attending: FAMILY MEDICINE
Payer: MEDICARE

## 2020-06-02 ENCOUNTER — TELEPHONE (OUTPATIENT)
Dept: GASTROENTEROLOGY | Facility: CLINIC | Age: 75
End: 2020-06-02

## 2020-06-02 VITALS
TEMPERATURE: 98 F | WEIGHT: 160.63 LBS | BODY MASS INDEX: 26.76 KG/M2 | HEIGHT: 65 IN | SYSTOLIC BLOOD PRESSURE: 150 MMHG | HEART RATE: 65 BPM | DIASTOLIC BLOOD PRESSURE: 67 MMHG | RESPIRATION RATE: 20 BRPM

## 2020-06-02 DIAGNOSIS — R20.2 PARESTHESIA: Primary | ICD-10-CM

## 2020-06-02 DIAGNOSIS — R42 DIZZINESS: ICD-10-CM

## 2020-06-02 DIAGNOSIS — R10.13 EPIGASTRIC PAIN: Primary | ICD-10-CM

## 2020-06-02 DIAGNOSIS — Z01.818 PREOP TESTING: ICD-10-CM

## 2020-06-02 DIAGNOSIS — R11.0 NAUSEA: ICD-10-CM

## 2020-06-02 PROCEDURE — 1159F MED LIST DOCD IN RCRD: CPT | Mod: S$GLB,,, | Performed by: PSYCHIATRY & NEUROLOGY

## 2020-06-02 PROCEDURE — 99999 PR PBB SHADOW E&M-EST. PATIENT-LVL IV: CPT | Mod: PBBFAC,,, | Performed by: PSYCHIATRY & NEUROLOGY

## 2020-06-02 PROCEDURE — 99999 PR PBB SHADOW E&M-EST. PATIENT-LVL IV: ICD-10-PCS | Mod: PBBFAC,,, | Performed by: PSYCHIATRY & NEUROLOGY

## 2020-06-02 PROCEDURE — 3078F PR MOST RECENT DIASTOLIC BLOOD PRESSURE < 80 MM HG: ICD-10-PCS | Mod: CPTII,S$GLB,, | Performed by: PSYCHIATRY & NEUROLOGY

## 2020-06-02 PROCEDURE — 1159F PR MEDICATION LIST DOCUMENTED IN MEDICAL RECORD: ICD-10-PCS | Mod: S$GLB,,, | Performed by: PSYCHIATRY & NEUROLOGY

## 2020-06-02 PROCEDURE — 99204 PR OFFICE/OUTPT VISIT, NEW, LEVL IV, 45-59 MIN: ICD-10-PCS | Mod: S$GLB,,, | Performed by: PSYCHIATRY & NEUROLOGY

## 2020-06-02 PROCEDURE — 1101F PR PT FALLS ASSESS DOC 0-1 FALLS W/OUT INJ PAST YR: ICD-10-PCS | Mod: CPTII,S$GLB,, | Performed by: PSYCHIATRY & NEUROLOGY

## 2020-06-02 PROCEDURE — 1101F PT FALLS ASSESS-DOCD LE1/YR: CPT | Mod: CPTII,S$GLB,, | Performed by: PSYCHIATRY & NEUROLOGY

## 2020-06-02 PROCEDURE — 1126F PR PAIN SEVERITY QUANTIFIED, NO PAIN PRESENT: ICD-10-PCS | Mod: S$GLB,,, | Performed by: PSYCHIATRY & NEUROLOGY

## 2020-06-02 PROCEDURE — 99204 OFFICE O/P NEW MOD 45 MIN: CPT | Mod: S$GLB,,, | Performed by: PSYCHIATRY & NEUROLOGY

## 2020-06-02 PROCEDURE — 3078F DIAST BP <80 MM HG: CPT | Mod: CPTII,S$GLB,, | Performed by: PSYCHIATRY & NEUROLOGY

## 2020-06-02 PROCEDURE — U0003 INFECTIOUS AGENT DETECTION BY NUCLEIC ACID (DNA OR RNA); SEVERE ACUTE RESPIRATORY SYNDROME CORONAVIRUS 2 (SARS-COV-2) (CORONAVIRUS DISEASE [COVID-19]), AMPLIFIED PROBE TECHNIQUE, MAKING USE OF HIGH THROUGHPUT TECHNOLOGIES AS DESCRIBED BY CMS-2020-01-R: HCPCS

## 2020-06-02 PROCEDURE — 3077F SYST BP >= 140 MM HG: CPT | Mod: CPTII,S$GLB,, | Performed by: PSYCHIATRY & NEUROLOGY

## 2020-06-02 PROCEDURE — 3077F PR MOST RECENT SYSTOLIC BLOOD PRESSURE >= 140 MM HG: ICD-10-PCS | Mod: CPTII,S$GLB,, | Performed by: PSYCHIATRY & NEUROLOGY

## 2020-06-02 PROCEDURE — 1126F AMNT PAIN NOTED NONE PRSNT: CPT | Mod: S$GLB,,, | Performed by: PSYCHIATRY & NEUROLOGY

## 2020-06-02 RX ORDER — GABAPENTIN 300 MG/1
300 CAPSULE ORAL 3 TIMES DAILY
Qty: 90 CAPSULE | Refills: 11 | Status: SHIPPED | OUTPATIENT
Start: 2020-06-02 | End: 2020-06-30 | Stop reason: SINTOL

## 2020-06-02 NOTE — PROGRESS NOTES
Date of service:  6/2/2020    Chief complaint:  Paresthesias    History of present illness:  The patient is a 74 y.o. female referred for evaluation of paresthesia.  I saw her in 2015 for complaints of numbness. The patient reports that this has gradually progressed prior to worsening abruptly over the last several months. The sensation is located in the bilateral lower extremities to the thighs and in the hands.  The feeling is characterized as pins and needles and is severe in intensity.  She notes no clear exacerbating or relieving factors.  She also noticed associated muscle cramps.  The sensation is present continuously.    Past Medical History:   Diagnosis Date    Allergy     Anxiety     Cataract     Colon polyp     Degenerative arthritis of knee 4/3/2012    Diverticulosis     GERD (gastroesophageal reflux disease)     Hyperlipidemia     Hypertension     Multinodular goiter 3/26/2012    Myopathy, unspecified 1/18/2010    New onset type 2 diabetes mellitus 3/24/2020    New onset type 2 diabetes mellitus 3/24/2020    Tuberculosis        Past Surgical History:   Procedure Laterality Date    BREAST BIOPSY Left 2015    benign    COLONOSCOPY  12/2/15    Dr. Britton, multiple polyps, recheck five years-three years if more than 2 polyps are adenomatous    COLONOSCOPY N/A 12/2/2015    COLONOSCOPY N/A 3/20/2019    Procedure: COLONOSCOPY;  Surgeon: Jose Britton MD;  Location: [facility];  Service: Endoscopy;  Laterality: N/A;    COLONOSCOPY N/A 5/20/2020    Dr. Britton; internal hemorrhoids; diverticulosis; polyps removed; repeat in 3 years    ESOPHAGOGASTRODUODENOSCOPY N/A 5/20/2020    Dr. Britton; small hiatal hernia; gastritis; 8 gastric polyps removed    FOOT SURGERY      HYSTERECTOMY      KNEE SURGERY  06/06/2013    right knee tear Dr Iverson     LUNG LOBECTOMY  1966    right middle lobectomy, due to Tb    OOPHORECTOMY      SHOULDER SURGERY      francis        Family History   Problem  Relation Age of Onset    Colon cancer Other     Cancer Mother     Hypertension Mother     Hyperlipidemia Mother     Cancer Brother     Hypertension Father     Emphysema Father     Diabetes Son     Hypertension Son     Alcohol abuse Son     Diabetes Maternal Aunt     Alzheimer's disease Maternal Uncle     Cancer Maternal Grandmother     No Known Problems Daughter     Dementia Sister     Alzheimer's disease Sister     Breast cancer Sister 60    Obesity Paternal Uncle     Prostate cancer Other     Melanoma Neg Hx     Psoriasis Neg Hx     Lupus Neg Hx     Eczema Neg Hx     Amblyopia Neg Hx     Blindness Neg Hx     Cataracts Neg Hx     Glaucoma Neg Hx     Macular degeneration Neg Hx     Retinal detachment Neg Hx     Strabismus Neg Hx     Stroke Neg Hx     Thyroid disease Neg Hx        Social History     Socioeconomic History    Marital status:      Spouse name: Not on file    Number of children: Not on file    Years of education: Not on file    Highest education level: Not on file   Occupational History    Not on file   Social Needs    Financial resource strain: Not on file    Food insecurity:     Worry: Not on file     Inability: Not on file    Transportation needs:     Medical: Not on file     Non-medical: Not on file   Tobacco Use    Smoking status: Never Smoker    Smokeless tobacco: Never Used   Substance and Sexual Activity    Alcohol use: Not Currently     Comment: stopped 2019    Drug use: No    Sexual activity: Not Currently   Lifestyle    Physical activity:     Days per week: Not on file     Minutes per session: Not on file    Stress: Not on file   Relationships    Social connections:     Talks on phone: Not on file     Gets together: Not on file     Attends [employer] service: Not on file     Active member of club or organization: Not on file     Attends meetings of clubs or organizations: Not on file     Relationship status: Not on file   Other Topics  "Concern    Are you pregnant or think you may be? Not Asked    Breast-feeding Not Asked   Social History Narrative    Not on file       Review of patient's allergies indicates:   Allergen Reactions    No known drug allergies         Review of Systems  General/Constitutional:  No unintentional weight loss, No change in appetite  Eyes/Vision:  No change in vision, No double vision  ENT:  No frequent nose bleeds, No ringing in the ears  Respiratory:  No cough, No wheezing  Cardiovascular:  No chest pain, No palpitations  Gastrointestinal:  No jaundice, No nausea/vomiting  Genitourinary:  No incontinence, No burning with urination  Hematologic/Lymphatic:  No easy bruising/bleeding, No night sweats  Neurological:  + numbness, No weakness  Endocrine:  No fatigue, No heat/cold intolerance  Allergy/Immunologic:  No fevers, No chills  Musculoskeletal:  + muscle pain, No joint pain   Psychiatric:  No thoughts of harming self/others, No depression  Integumentary:  No rashes, No sores that do not heal    Physical exam:  BP (!) 150/67   Pulse 65   Temp 98.3 °F (36.8 °C)   Resp 20   Ht 5' 5" (1.651 m)   Wt 72.8 kg (160 lb 9.7 oz)   BMI 26.73 kg/m²   General: Well developed, well nourished.  No acute distress.  ENT: Mucus membranes moist.  Atraumatic external nose and ears.  Lymphatic: No apparent lymphadenopathy.  Cardiovascular: Regular rate and rhythm.  Pulmonary: No increased work of breathing.  Abdomen/GI: No guarding.  Musculoskeletal: No obvious joint deformities, moves all extremities well.    Neurological exam:  Mental status: Awake and alert.  Oriented x4.  Speech fluent and appropriate.  Recent and remote memory appear to be intact.  Fund of knowledge normal.  Cranial nerves: Pupils equal round and reactive to light, extraocular movements intact, facial strength and sensation intact bilaterally, palate and tongue midline, hearing grossly intact bilaterally.  Motor: 5 out of 5 strength throughout the upper and " lower extremities bilaterally. Normal bulk and tone.  Sensation: Intact to light touch and temperature bilaterally.  Decreased vibratory sensation in the distal LEs bilaterally.  DTR: 1+ at the knees and biceps bilaterally.  Coordination: Finger-nose-finger testing intact bilaterally.  Gait: Cautious gait.    Data base:  Notes of the referring physician were reviewed.  Briefly summarized, these discuss that she has had some paresthesias.    Assessment and plan:  The patient is a 74 y.o. female referred for evaluation of paresthesia. I suspect that this issue is related to peripheral neuropathy. I think a reasonable workup at this point would include labs and EMG.  We will give the patient Neurontin for symptomatic relief. Medication side effects were discussed with the patient. We will plan on seeing the patient back in a few weeks.

## 2020-06-02 NOTE — LETTER
June 5, 2020      Narayan Myers MD  [address] [facility] [address]           [facility]  [address] [place] [address]  Phone: [phone]  Fax: [phone]          Patient: Erica Masterson   MR Number: [id number]   YOB: 1945   Date of Visit: 6/2/2020       Dear Dr. Narayan Myers:    Thank you for referring Erica Masterson to me for evaluation. Attached you will find relevant portions of my assessment and plan of care.    If you have questions, please do not hesitate to call me. I look forward to following Erica Masterson along with you.    Sincerely,    Nilay Lovett Jr., MD    Enclosure  CC:  No Recipients    If you would like to receive this communication electronically, please contact [email or web address] or [phone] to request more information on [email or web address] Link access.    For providers and/or their staff who would like to refer a patient to [facility], please contact us through our one-stop-shop provider referral line, [facility], at [phone].    If you feel you have received this communication in error or would no longer like to receive these types of communications, please e-mail [email or web address]

## 2020-06-02 NOTE — TELEPHONE ENCOUNTER
----- Message from Rowena John sent at 6/2/2020  1:52 PM CDT -----  Good evening! Pt is scheduled to have a ct with contrast and will need to have creatinine labs. If you will notify me when orders have been placed and I will schedule pt   Thanking you in advance

## 2020-06-03 ENCOUNTER — TELEPHONE (OUTPATIENT)
Dept: GASTROENTEROLOGY | Facility: CLINIC | Age: 75
End: 2020-06-03

## 2020-06-03 LAB — SARS-COV-2 RNA RESP QL NAA+PROBE: NOT DETECTED

## 2020-06-03 NOTE — TELEPHONE ENCOUNTER
----- Message from Leslie Gilman sent at 6/3/2020  9:51 AM CDT -----  Contact: patient  Type: Needs Medical Advice  Who Called:  patient  Best Call Back Number: [phone]  Additional Information: Patient wants to know if she needs to take her medication this morning.  Patient states she has errands to run so please just leave a message.  Thanks!

## 2020-06-04 ENCOUNTER — LAB VISIT (OUTPATIENT)
Dept: LAB | Facility: HOSPITAL | Age: 75
End: 2020-06-04
Attending: FAMILY MEDICINE
Payer: MEDICARE

## 2020-06-04 ENCOUNTER — HOSPITAL ENCOUNTER (OUTPATIENT)
Facility: HOSPITAL | Age: 75
Discharge: HOME | End: 2020-06-04
Attending: INTERNAL MEDICINE | Admitting: INTERNAL MEDICINE
Payer: MEDICARE

## 2020-06-04 ENCOUNTER — TELEPHONE (OUTPATIENT)
Dept: FAMILY MEDICINE | Facility: CLINIC | Age: 75
End: 2020-06-04

## 2020-06-04 VITALS
TEMPERATURE: 98 F | SYSTOLIC BLOOD PRESSURE: 136 MMHG | RESPIRATION RATE: 18 BRPM | HEART RATE: 83 BPM | DIASTOLIC BLOOD PRESSURE: 73 MMHG | OXYGEN SATURATION: 99 %

## 2020-06-04 DIAGNOSIS — R30.0 BURNING WITH URINATION: Primary | ICD-10-CM

## 2020-06-04 DIAGNOSIS — R35.0 URINARY FREQUENCY: ICD-10-CM

## 2020-06-04 DIAGNOSIS — D50.9 IRON DEFICIENCY ANEMIA: ICD-10-CM

## 2020-06-04 DIAGNOSIS — R30.0 BURNING WITH URINATION: ICD-10-CM

## 2020-06-04 LAB
BACTERIA #/AREA URNS AUTO: ABNORMAL /HPF
BILIRUB UR QL STRIP: NEGATIVE
CLARITY UR: CLEAR
COLOR UR: YELLOW
GLUCOSE UR QL STRIP: NEGATIVE
HGB UR QL STRIP: ABNORMAL
KETONES UR QL STRIP: NEGATIVE
LEUKOCYTE ESTERASE UR QL STRIP: NEGATIVE
MICROSCOPIC COMMENT: ABNORMAL
NITRITE UR QL STRIP: NEGATIVE
PH UR STRIP: 6 [PH] (ref 5–8)
PROT UR QL STRIP: ABNORMAL
RBC #/AREA URNS AUTO: 7 /HPF (ref 0–4)
SP GR UR STRIP: 1.02 (ref 1–1.03)
URN SPEC COLLECT METH UR: ABNORMAL
WBC #/AREA URNS AUTO: 1 /HPF (ref 0–5)

## 2020-06-04 PROCEDURE — 91110 GI TRC IMG INTRAL ESOPH-ILE: CPT | Mod: 26,52,, | Performed by: INTERNAL MEDICINE

## 2020-06-04 PROCEDURE — 87086 URINE CULTURE/COLONY COUNT: CPT

## 2020-06-04 PROCEDURE — 81000 URINALYSIS NONAUTO W/SCOPE: CPT | Mod: PO

## 2020-06-04 PROCEDURE — 91110 PR GI TRACT CAPSULE ENDOSCOPY: ICD-10-PCS | Mod: 26,52,, | Performed by: INTERNAL MEDICINE

## 2020-06-04 PROCEDURE — 91110 GI TRC IMG INTRAL ESOPH-ILE: CPT | Performed by: INTERNAL MEDICINE

## 2020-06-04 PROCEDURE — 25000003 PHARM REV CODE 250: Performed by: INTERNAL MEDICINE

## 2020-06-04 RX ORDER — DEXTROMETHORPHAN/PSEUDOEPHED 2.5-7.5/.8
40 DROPS ORAL 4 TIMES DAILY PRN
Status: DISCONTINUED | OUTPATIENT
Start: 2020-06-04 | End: 2020-06-04 | Stop reason: HOSPADM

## 2020-06-04 RX ORDER — SULFAMETHOXAZOLE AND TRIMETHOPRIM 800; 160 MG/1; MG/1
1 TABLET ORAL 2 TIMES DAILY
Qty: 14 TABLET | Refills: 0 | Status: SHIPPED | OUTPATIENT
Start: 2020-06-04 | End: 2020-06-30 | Stop reason: ALTCHOICE

## 2020-06-04 RX ADMIN — Medication 40 MG: at 07:06

## 2020-06-04 NOTE — DISCHARGE INSTRUCTIONS
Capsule Discharge Instructions     You have just swallowed a capsule endoscope.  This contains information about what to expect over the next 8 hours.  Please call our office if you have severe or persistent abdominal or chest pain, fever, difficulty swallowing or if you have any questions.  Our phone number is [phone].    Time Capsule ingested:_____8:20__________    · You may drink clear liquids (water, apple juice) 4 hours after swallowing the capsule. 12:20  · You may eat a light meal 6 hours after swallowing the capsule.  Medications may be resumed at 6 hours after swallowing the capsule.2:20  · Do not exercise, avoid heavy lifting.  You may walk, sit and lay down.  You can drive a car.  You may return to work, if you work allows avoiding unsuitable environments and /or physical movements.  · Avoid going near MRI machines and radio transmitters.  You may use a computer, cell phone, radio or stereo.  · Do not stand directly next to another person undergoing capsule endoscopy.  · Try not to touch the recorder or the sensor array leads.  Do not remove the leads before 8 hours.  · Avoid getting the data recorder or sensor array leads wet.  · You may loosen the belt to allow yourself to go to the bathroom.  Do not take the belt off until the 8 hours have passed.  · Observe the LED light on the data recorder at least every 15 minutes.  If the light stops blinking, document the time and call our office.  · Return the unit to the hospital at the completion of 8 hour time frame.    May have clear liquids at ________12:20_____    May have light snack and medications at ______2:20________    May remove the unit at ______4:20________    Return the unit to Registration Desk at the completion of the study.    Post Capsule Instructions     This information is what to expect over the next 2 days.  Please call us or your doctor if you have severe or persistent abdominal or chest pain, fever, difficulty swallowing, or if  you have any questions.  Our phone number is [phone].    · Pain:  Pain is uncommon following capsule endoscopy.  Should you feel sharp or persistent pain, please call your doctor's office.  · Nausea:  Nausea is also very uncommon and should it occur, please notify your doctor's office  · Diet:  You may eat and drink with no restrictions 8 hours after ingesting capsule.  · Activities:  Following the exam you may resume normal activities, including exercise.  · Medications:  You may resume your medications 6 hours after ingesting the capsule.  Do NOT make up doses you have missed, just resume your normal dosage.  · Further Testing:  Until the capsule passes, further testing which includes any type of MRI should be avoided.  If you have any type of MRI examination scheduled in the next 3 days, it should be postponed.  · The Capsule:  The capsule passes naturally in a bowel movement typically in about 24 hours.  Most likely, you will be unaware of its passage.  It does not need to be retrieved and can safely be flushed down the toilet.  Occasionally the capsule light will still be flashing when it passes.  Should you be concerned that the capsule didn't pass, in the absence of symptoms; an abdominal x-ray can be obtained after 7 days to confirm its passage.  · The capsule will make a beeping noise when finished.  It will shut off automatically.

## 2020-06-04 NOTE — TELEPHONE ENCOUNTER
Patient was counseled to increase fluid intake.  Avoid carbonated beverages.  Empty your bladder frequently, before and after intercourse, and wipe front to back when cleaning after using the bathroom.  Cranberry juice intake may help.  Notify MD if you have fever > 100.4, severe abdominal pain, blood in urine or if you see no improvement in 3 days.Pending UA and CNS.

## 2020-06-04 NOTE — TELEPHONE ENCOUNTER
----- Message from Berenice Jorge sent at 6/4/2020 11:22 AM CDT -----  Pt called to request issues with UTI again, pt is calling to request medication to be sent to [facility] pharmacy or pt would like to know if she needs to come in    Pt can be reached [phone]    Thank you!

## 2020-06-04 NOTE — TELEPHONE ENCOUNTER
Burning with urination, afebrile, no back or abdominal pain, no blood seen. ua and culture ordered.

## 2020-06-05 ENCOUNTER — TELEPHONE (OUTPATIENT)
Dept: GASTROENTEROLOGY | Facility: CLINIC | Age: 75
End: 2020-06-05

## 2020-06-05 NOTE — [employer] PATIENT INSTRUCTIONS
Discharge Summary/Instructions after an Endoscopic Procedure  Patient Name: Erica Masterson  Patient MRN: [id number]  Patient YOB: 1945  Thursday, June 4, 2020  Jose Coughlin MD  RESTRICTIONS:  During your procedure today, you received medications for sedation.  These   medications may affect your judgment, balance and coordination.  Therefore,   for 24 hours, you have the following restrictions:   - DO NOT drive a car, operate machinery, make legal/financial decisions,   sign important papers or drink alcohol.    ACTIVITY:  Today: no heavy lifting, straining or running due to procedural   sedation/anesthesia.  The following day: return to full activity including work.  DIET:  Eat and drink normally unless instructed otherwise.     TREATMENT FOR COMMON SIDE EFFECTS:  - Mild abdominal pain, nausea, belching, bloating or excessive gas:  rest,   eat lightly and use a heating pad.  - Sore Throat: treat with throat lozenges and/or gargle with warm salt   water.  - Because air was used during the procedure, expelling large amounts of air   from your rectum or belching is normal.  - If a bowel prep was taken, you may not have a bowel movement for 1-3 days.    This is normal.  SYMPTOMS TO WATCH FOR AND REPORT TO YOUR PHYSICIAN:  1. Abdominal pain or bloating, other than gas cramps.  2. Chest pain.  3. Back pain.  4. Signs of infection such as: chills or fever occurring within 24 hours   after the procedure.  5. Rectal bleeding, which would show as bright red, maroon, or black stools.   (A tablespoon of blood from the rectum is not serious, especially if   hemorrhoids are present.)  6. Vomiting.  7. Weakness or dizziness.  GO DIRECTLY TO THE NEAREST EMERGENCY ROOM IF YOU HAVE ANY OF THE FOLLOWING:      Difficulty breathing              Chills and/or fever over 101 F   Persistent vomiting and/or vomiting blood   Severe abdominal pain   Severe chest pain   Black, tarry stools   Bleeding- more than one  tablespoon   Any other symptom or condition that you feel may need urgent attention  Your doctor recommends these additional instructions:  If any biopsies were taken, your doctors clinic will contact you in 1 to 2   weeks with any results.  1.  Continue current medications  2.  Iron supplementation PRN  3.  Will send images to small bowel specialist and send referral for patient   to be seen for possible deep enteroscopy.  For questions, problems or results please call your physician - Jose Coughlin MD at Work:  [phone].  [facility] [place], EMERGENCY ROOM PHONE NUMBER: [phone]  IF A COMPLICATION OR EMERGENCY SITUATION ARISES AND YOU ARE UNABLE TO REACH   YOUR PHYSICIAN - GO DIRECTLY TO THE EMERGENCY ROOM.  Jose Coughlin MD  6/5/2020 1:23:23 PM  This report has been verified and signed electronically.  [employer]

## 2020-06-05 NOTE — TELEPHONE ENCOUNTER
----- Message from Radha Shearer sent at 6/5/2020  8:28 AM CDT -----  Contact: self  Type: Needs Medical Advice  Who Called:  self  Symptoms (please be specific):  Nausea, dizziness , stomach bulbby  How long has patient had these symptoms:  Every night for 2 months  Pharmacy name and phone #:    [id number] - [place], [place] - [address]  [place] [address]  Phone: [phone] Fax: [phone]  Best Call Back Number: [phone] (home) [phone]  Additional Information: Patient states Dr Myers called with results of a UTI and it is the third time in 3 weeks. Please call patient to advise. Thanks!

## 2020-06-05 NOTE — TELEPHONE ENCOUNTER
Patient states Dr. Myers called and she has another UTI and sent in antibiotics, she c/o nausea vomiting and feeling shaky epigastric pain dizziness since March nothing is helping she has a CT scan on Monday ordered by the NP, patient instructed if she is feeling that bad and needs immediate attentions she needs to go to the ER, she states she might go but they never help.

## 2020-06-06 LAB — BACTERIA UR CULT: NO GROWTH

## 2020-06-09 ENCOUNTER — HOSPITAL ENCOUNTER (OUTPATIENT)
Dept: RADIOLOGY | Facility: HOSPITAL | Age: 75
Discharge: HOME | End: 2020-06-09
Attending: FAMILY MEDICINE
Payer: MEDICARE

## 2020-06-09 ENCOUNTER — TELEPHONE (OUTPATIENT)
Dept: GASTROENTEROLOGY | Facility: CLINIC | Age: 75
End: 2020-06-09

## 2020-06-09 ENCOUNTER — HOSPITAL ENCOUNTER (OUTPATIENT)
Dept: RADIOLOGY | Facility: HOSPITAL | Age: 75
Discharge: HOME | End: 2020-06-09
Attending: NURSE PRACTITIONER
Payer: MEDICARE

## 2020-06-09 ENCOUNTER — TELEPHONE (OUTPATIENT)
Dept: RADIOLOGY | Facility: HOSPITAL | Age: 75
End: 2020-06-09

## 2020-06-09 DIAGNOSIS — R92.8 ABNORMAL MAMMOGRAM OF RIGHT BREAST: ICD-10-CM

## 2020-06-09 DIAGNOSIS — D50.9 IRON DEFICIENCY ANEMIA, UNSPECIFIED IRON DEFICIENCY ANEMIA TYPE: Primary | ICD-10-CM

## 2020-06-09 DIAGNOSIS — R92.1 CALCIFICATION OF RIGHT BREAST ON MAMMOGRAPHY: ICD-10-CM

## 2020-06-09 DIAGNOSIS — R11.0 NAUSEA: ICD-10-CM

## 2020-06-09 DIAGNOSIS — R92.0 MICROCALCIFICATION OF RIGHT BREAST ON MAMMOGRAM: Primary | ICD-10-CM

## 2020-06-09 DIAGNOSIS — N13.30 HYDRONEPHROSIS, UNSPECIFIED HYDRONEPHROSIS TYPE: Primary | ICD-10-CM

## 2020-06-09 DIAGNOSIS — R10.13 EPIGASTRIC PAIN: ICD-10-CM

## 2020-06-09 PROCEDURE — 25500020 PHARM REV CODE 255

## 2020-06-09 PROCEDURE — 77061 BREAST TOMOSYNTHESIS UNI: CPT | Mod: 26,,, | Performed by: RADIOLOGY

## 2020-06-09 PROCEDURE — 77065 DX MAMMO INCL CAD UNI: CPT | Mod: 26,,, | Performed by: RADIOLOGY

## 2020-06-09 PROCEDURE — 74177 CT ABD & PELVIS W/CONTRAST: CPT | Mod: TC

## 2020-06-09 PROCEDURE — 77061 MAMMO DIGITAL DIAGNOSTIC RIGHT WITH TOMOSYNTHESIS_CAD: ICD-10-PCS | Mod: 26,,, | Performed by: RADIOLOGY

## 2020-06-09 PROCEDURE — A9698 NON-RAD CONTRAST MATERIALNOC: HCPCS

## 2020-06-09 PROCEDURE — 77065 DX MAMMO INCL CAD UNI: CPT | Mod: TC

## 2020-06-09 PROCEDURE — 74177 CT ABD & PELVIS W/CONTRAST: CPT | Mod: 26,,, | Performed by: RADIOLOGY

## 2020-06-09 PROCEDURE — 77061 BREAST TOMOSYNTHESIS UNI: CPT | Mod: TC

## 2020-06-09 PROCEDURE — 74177 CT ABDOMEN PELVIS WITH CONTRAST: ICD-10-PCS | Mod: 26,,, | Performed by: RADIOLOGY

## 2020-06-09 PROCEDURE — 77065 MAMMO DIGITAL DIAGNOSTIC RIGHT WITH TOMOSYNTHESIS_CAD: ICD-10-PCS | Mod: 26,,, | Performed by: RADIOLOGY

## 2020-06-09 RX ADMIN — IOHEXOL 1000 ML: 9 SOLUTION ORAL at 10:06

## 2020-06-09 RX ADMIN — IOHEXOL 75 ML: 350 INJECTION, SOLUTION INTRAVENOUS at 10:06

## 2020-06-09 NOTE — TELEPHONE ENCOUNTER
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  a referral to small bowel specialist (Dr. Unger) at [facility] for possible enteroscopy has been placed and staff message sent. .

## 2020-06-09 NOTE — TELEPHONE ENCOUNTER
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  She needs a referral to small bowel specialist (Dr. Unger) at [facility] for possible enteroscopy.  Please arrange.

## 2020-06-09 NOTE — TELEPHONE ENCOUNTER
----- Message from Janice Cordova sent at 6/9/2020 11:38 AM CDT -----  Contact: Pt  Type:  Patient Returning Call    Who Called:  Pt  Who Left Message for Patient:  lili  Does the patient know what this is regarding?:  Test results  Best Call Back Number: [phone]

## 2020-06-09 NOTE — TELEPHONE ENCOUNTER
.Patient notified of diagnostic mammogram results.  Needs to schedule right breast biopsy.   I will call her back with date and time.

## 2020-06-10 NOTE — TELEPHONE ENCOUNTER
It looks like GI discuss the results with her this morning.  They ordered the CT.  She has mild right hydronephrosis which could indicate a partial obstruction of the drainage of the right kidney.  There are what appear to be phleboliths, calcium deposits in veins, in the pelvis around the right ureter so they could not rule out the possibility of a ureteral stone causing an obstruction.  They recommended that she see Urology.

## 2020-06-10 NOTE — PROGRESS NOTES
"[facility] Note  Staff: KAREN Milton-C    PCP: Dr. Narayan Myers    Chief Complaint: Right sided Hydronephrosis    Subjective:        HPI: Erica Masterson is a 74 y.o. female NEW PT presents today for evaluation of recent abnormal findings on CT scan.  Scan showed mild right hydronephrosis.  Also there are what appear to be phleboliths, calcium deposits in veins, in the pelvis around the right ureter so they could not rule out the possibility of a ureteral stone at that time.  Past "3" urine cultures since April 2020 have come back showing NO GROWTH.    Today pt c/o severe nausea and mid abdominal pains, feels like she has to relieve a "big bowel movement" but states this am during visit nothing comes out.    RECENT MEDICAL HX/CHANGES:  Pt recently underwent G.I. Workup and currently awaiting an appointment with Small bowel specialist at [facility] due to small, leaking blood vessels (angioectasias) in small intestines.    Questions asked pt during office visit today:  DTF: None, NTF: 1 x night  No recent chills or fever  Urgency:No, incontinence with urgency? No; bothersome No; .  Incontinence with laughing or straining: No; bothersome: No  Gross HematuriaNo  History of UTI: No  Hx of Kidney stones?:  None  Pt denies Flank pain    RECENT IMAGING:  CT Abdomen Pelvis with Contrast 06/09/2020:  IMPRESSION:  Mild right-sided hydronephrosis.  There are at least 5 new calcifications consistent with phleboliths in the right retroperitoneal image 4 on the left.  A ureteral stone therefore cannot be ruled out.  Prior hysterectomy.  Mild diverticulosis coli.    XR Abdomen AP 1 View done 05/12/2020:  IMPRESSION:  No acute process.  Foreign object, likely external as above.    REVIEW OF SYSTEMS:  A comprehensive 10 system review was performed and is negative except as noted above in HPI    PMHx:  Past Medical History:   Diagnosis Date    Allergy     Anxiety     Cataract     Colon polyp  "    Degenerative arthritis of knee 4/3/2012    Diverticulosis     GERD (gastroesophageal reflux disease)     Hyperlipidemia     Hypertension     Multinodular goiter 3/26/2012    Myopathy, unspecified 1/18/2010    New onset type 2 diabetes mellitus 3/24/2020    New onset type 2 diabetes mellitus 3/24/2020    Tuberculosis      PSHx:  Past Surgical History:   Procedure Laterality Date    BREAST BIOPSY Left 2015    benign    COLONOSCOPY  12/2/15    Dr. Britton, multiple polyps, recheck five years-three years if more than 2 polyps are adenomatous    COLONOSCOPY N/A 12/2/2015    COLONOSCOPY N/A 3/20/2019    Procedure: COLONOSCOPY;  Surgeon: Jose Britton MD;  Location[facility] ENDO;  Service: Endoscopy;  Laterality: N/A;    COLONOSCOPY N/A 5/20/2020    Dr. Britton; internal hemorrhoids; diverticulosis; polyps removed; repeat in 3 years    ESOPHAGOGASTRODUODENOSCOPY N/A 5/20/2020    Dr. Britton; small hiatal hernia; gastritis; 8 gastric polyps removed    FOOT SURGERY      HYSTERECTOMY      INTRALUMINAL GASTROINTESTINAL TRACT IMAGING VIA CAPSULE N/A 6/4/2020    Procedure: IMAGING PROCEDURE, GI TRACT, INTRALUMINAL, VIA CAPSULE;  Surgeon: Jose Britton MD;  Location: [facility] ENDO;  Service: Endoscopy;  Laterality: N/A;    KNEE SURGERY  06/06/2013    right knee tear Dr Iverson     LUNG LOBECTOMY  1966    right middle lobectomy, due to Tb    OOPHORECTOMY      SHOULDER SURGERY      francis      Fam Hx:   malignancies: No , gyn malignancies: No      Allergies:  No known drug allergies    Medications: reviewed   Anticoagulation: Ecotrin 81 mg daily    Objective:     Vitals:    06/11/20 0756   BP: 131/64   Pulse: 89   Temp: 97.8 °F (36.6 °C)     Physical Exam   Vitals reviewed.  Constitutional: She is oriented to person, place, and time. She appears well-developed and well-nourished.   HENT:   Head: Normocephalic and atraumatic.   Eyes: Conjunctivae and EOM are normal. Pupils are equal, round, and reactive to  light.   Neck: Normal range of motion. Neck supple.   Cardiovascular: Normal rate, regular rhythm, normal heart sounds and intact distal pulses.    Pulmonary/Chest: Effort normal and breath sounds normal.   Abdominal: Soft. Bowel sounds are normal.   Musculoskeletal: Normal range of motion.   Neurological: She is alert and oriented to person, place, and time. She has normal reflexes.   Skin: Skin is warm and dry.     Psychiatric: She has a normal mood and affect. Her behavior is normal. Judgment and thought content normal.     LABS REVIEW:  UA today:   Color:Clear, Yellow  Spec. Grav.  1.030  PH  5.0  Moderate Blood in urine  Assessment:       1. Hydronephrosis of right kidney    2. Hydronephrosis, unspecified hydronephrosis type    3. Hematuria, unspecified type          Plan:   Mild Right sided hydronephrosis:    1. UA, UA Micro, Urine culture, and Urine cytology to be performed.  2. KUB XR/Renal/Bladder US to be scheduled     F/u with Urologist to go over ALL imaging and discuss whether there is a need for further evaluation of her symptoms and findings at this time.  Questionable stone, therefore if all imaging is ?, then next step would be Cysto with bilateral retrograde/pyelogram.      F/u-Our office will contact this pt after we receive and review ALL imaging and urine results to determine next best POC for this patient.  Pt verbalized understanding at conclusion of appointment today.  Pt was instructed to go to nearest ER or can contact our office should their symptoms worsen or any new  complaints.    [facility]: Declined    Ellyn Jean-Baptiste, SELAM

## 2020-06-10 NOTE — TELEPHONE ENCOUNTER
Patient notified of diagnostic mammogram results.  Right breast biopsy is scheduled on  6/17/2020.

## 2020-06-11 ENCOUNTER — TELEPHONE (OUTPATIENT)
Dept: NEUROLOGY | Facility: HOSPITAL | Age: 75
End: 2020-06-11

## 2020-06-11 ENCOUNTER — TELEPHONE (OUTPATIENT)
Dept: GASTROENTEROLOGY | Facility: CLINIC | Age: 75
End: 2020-06-11

## 2020-06-11 ENCOUNTER — APPOINTMENT (OUTPATIENT)
Dept: LAB | Facility: HOSPITAL | Age: 75
End: 2020-06-11
Attending: NURSE PRACTITIONER
Payer: MEDICARE

## 2020-06-11 ENCOUNTER — OFFICE VISIT (OUTPATIENT)
Dept: UROLOGY | Facility: CLINIC | Age: 75
End: 2020-06-11
Attending: FAMILY MEDICINE
Payer: MEDICARE

## 2020-06-11 VITALS
DIASTOLIC BLOOD PRESSURE: 64 MMHG | HEIGHT: 65 IN | BODY MASS INDEX: 26.19 KG/M2 | WEIGHT: 157.19 LBS | HEART RATE: 89 BPM | TEMPERATURE: 98 F | SYSTOLIC BLOOD PRESSURE: 131 MMHG

## 2020-06-11 DIAGNOSIS — N13.30 HYDRONEPHROSIS OF RIGHT KIDNEY: Primary | ICD-10-CM

## 2020-06-11 DIAGNOSIS — R31.9 HEMATURIA, UNSPECIFIED TYPE: ICD-10-CM

## 2020-06-11 DIAGNOSIS — N13.30 HYDRONEPHROSIS, UNSPECIFIED HYDRONEPHROSIS TYPE: ICD-10-CM

## 2020-06-11 LAB
BACTERIA #/AREA URNS HPF: ABNORMAL /HPF
BILIRUB SERPL-MCNC: ABNORMAL MG/DL
BILIRUB UR QL STRIP: NEGATIVE
BLOOD URINE, POC: ABNORMAL
CLARITY UR: CLEAR
CLARITY, POC UA: CLEAR
COLOR UR: YELLOW
COLOR, POC UA: YELLOW
GLUCOSE UR QL STRIP: ABNORMAL
GLUCOSE UR QL STRIP: NEGATIVE
HGB UR QL STRIP: ABNORMAL
HYALINE CASTS #/AREA URNS LPF: 5 /LPF
KETONES UR QL STRIP: ABNORMAL
KETONES UR QL STRIP: NEGATIVE
LEUKOCYTE ESTERASE UR QL STRIP: NEGATIVE
LEUKOCYTE ESTERASE URINE, POC: ABNORMAL
MICROSCOPIC COMMENT: ABNORMAL
NITRITE UR QL STRIP: NEGATIVE
NITRITE, POC UA: ABNORMAL
PH UR STRIP: 5 [PH] (ref 5–8)
PH, POC UA: 5
PROT UR QL STRIP: NEGATIVE
PROTEIN, POC: ABNORMAL
RBC #/AREA URNS HPF: 14 /HPF (ref 0–4)
SP GR UR STRIP: >=1.03 (ref 1–1.03)
SPECIFIC GRAVITY, POC UA: 1.03
SQUAMOUS #/AREA URNS HPF: 1 /HPF
URN SPEC COLLECT METH UR: ABNORMAL
UROBILINOGEN UR STRIP-ACNC: NEGATIVE EU/DL
UROBILINOGEN, POC UA: 0.2
WBC #/AREA URNS HPF: 2 /HPF (ref 0–5)

## 2020-06-11 PROCEDURE — 1101F PR PT FALLS ASSESS DOC 0-1 FALLS W/OUT INJ PAST YR: ICD-10-PCS | Mod: CPTII,,, | Performed by: NURSE PRACTITIONER

## 2020-06-11 PROCEDURE — 3078F PR MOST RECENT DIASTOLIC BLOOD PRESSURE < 80 MM HG: ICD-10-PCS | Mod: CPTII,,, | Performed by: NURSE PRACTITIONER

## 2020-06-11 PROCEDURE — 99999 PR PBB SHADOW E&M-EST. PATIENT-LVL V: CPT | Mod: PBBFAC,,, | Performed by: NURSE PRACTITIONER

## 2020-06-11 PROCEDURE — 81000 URINALYSIS NONAUTO W/SCOPE: CPT

## 2020-06-11 PROCEDURE — 88112 PR  CYTOPATH, CELL ENHANCE TECH: ICD-10-PCS | Mod: 26,,, | Performed by: PATHOLOGY

## 2020-06-11 PROCEDURE — 87086 URINE CULTURE/COLONY COUNT: CPT

## 2020-06-11 PROCEDURE — 88112 CYTOPATH CELL ENHANCE TECH: CPT | Performed by: PATHOLOGY

## 2020-06-11 PROCEDURE — 1125F PR PAIN SEVERITY QUANTIFIED, PAIN PRESENT: ICD-10-PCS | Mod: ,,, | Performed by: NURSE PRACTITIONER

## 2020-06-11 PROCEDURE — 1125F AMNT PAIN NOTED PAIN PRSNT: CPT | Mod: ,,, | Performed by: NURSE PRACTITIONER

## 2020-06-11 PROCEDURE — 99204 PR OFFICE/OUTPT VISIT, NEW, LEVL IV, 45-59 MIN: ICD-10-PCS | Mod: 25,,, | Performed by: NURSE PRACTITIONER

## 2020-06-11 PROCEDURE — 81002 POCT URINE DIPSTICK WITHOUT MICROSCOPE: ICD-10-PCS | Mod: ,,, | Performed by: NURSE PRACTITIONER

## 2020-06-11 PROCEDURE — 3078F DIAST BP <80 MM HG: CPT | Mod: CPTII,,, | Performed by: NURSE PRACTITIONER

## 2020-06-11 PROCEDURE — 3075F PR MOST RECENT SYSTOLIC BLOOD PRESS GE 130-139MM HG: ICD-10-PCS | Mod: CPTII,,, | Performed by: NURSE PRACTITIONER

## 2020-06-11 PROCEDURE — 88112 CYTOPATH CELL ENHANCE TECH: CPT | Mod: 26,,, | Performed by: PATHOLOGY

## 2020-06-11 PROCEDURE — 99204 OFFICE O/P NEW MOD 45 MIN: CPT | Mod: 25,,, | Performed by: NURSE PRACTITIONER

## 2020-06-11 PROCEDURE — 1101F PT FALLS ASSESS-DOCD LE1/YR: CPT | Mod: CPTII,,, | Performed by: NURSE PRACTITIONER

## 2020-06-11 PROCEDURE — 1159F MED LIST DOCD IN RCRD: CPT | Mod: ,,, | Performed by: NURSE PRACTITIONER

## 2020-06-11 PROCEDURE — 1159F PR MEDICATION LIST DOCUMENTED IN MEDICAL RECORD: ICD-10-PCS | Mod: ,,, | Performed by: NURSE PRACTITIONER

## 2020-06-11 PROCEDURE — 81002 URINALYSIS NONAUTO W/O SCOPE: CPT | Mod: ,,, | Performed by: NURSE PRACTITIONER

## 2020-06-11 PROCEDURE — 3075F SYST BP GE 130 - 139MM HG: CPT | Mod: CPTII,,, | Performed by: NURSE PRACTITIONER

## 2020-06-11 PROCEDURE — 99999 PR PBB SHADOW E&M-EST. PATIENT-LVL V: ICD-10-PCS | Mod: PBBFAC,,, | Performed by: NURSE PRACTITIONER

## 2020-06-11 NOTE — LETTER
June 11, 2020      Narayan Myers MD  [address]  [place] [address]           [place] [facility]  [address] [place] [address] [address]  [place] [address]  Phone: [phone]  Fax: [phone]          Patient: Erica Masterson   MR Number: [id number]   YOB: 1945   Date of Visit: 6/11/2020       Dear Dr. Narayan Myers:    Thank you for referring Erica Masterson to me for evaluation. Attached you will find relevant portions of my assessment and plan of care.    If you have questions, please do not hesitate to call me. I look forward to following Erica Masterson along with you.    Sincerely,    Ellyn Jean-Baptiste, [facility]    Enclosure  CC:  No Recipients    If you would like to receive this communication electronically, please contact [email or web address] or [phone] to request more information on [employer] access.    For providers and/or their staff who would like to refer a patient to [facility], please contact us through our one-stop-shop provider referral line, [facility], at [phone].    If you feel you have received this communication in error or would no longer like to receive these types of communications, please e-mail [email or web address]

## 2020-06-11 NOTE — TELEPHONE ENCOUNTER
Patient came by office today states she is still having abdominal pain nausea feels like she is going to throw up bowel, she states please help she cant stand it anymore.

## 2020-06-11 NOTE — TELEPHONE ENCOUNTER
Patient instructed as per our converstation in the office this morning message sent to DR. Borja about her stomach and the referral and message has been sent to DR. Unger and that office will have to call her to schedule.

## 2020-06-11 NOTE — TELEPHONE ENCOUNTER
----- Message from Heena Cosby sent at 6/11/2020 10:51 AM CDT -----  Contact: PT  Appointment Access:    Pt called to speak to the nurse regarding her care and to schedule a follow up appt and can be reached at [phone]

## 2020-06-11 NOTE — TELEPHONE ENCOUNTER
----- Message from Heena Cosby sent at 6/11/2020 10:51 AM CDT -----  Contact: PT  Appointment Access:    Pt called to speak to the nurse regarding her care and to schedule a follow up appt and can be reached at [phone]

## 2020-06-12 LAB
BACTERIA UR CULT: NO GROWTH
FINAL PATHOLOGIC DIAGNOSIS: NORMAL

## 2020-06-12 NOTE — TELEPHONE ENCOUNTER
----- Message from Rosanna Cronin MA sent at 6/12/2020  1:41 PM CDT -----  Contact: patient  Type: Needs Medical Advice  Who Called:  Erica Monzon Call Back Number:  or   Additional Information: patient would like to speak with Jean-Pierre about a reoccurring issue. Please call to discuss

## 2020-06-12 NOTE — TELEPHONE ENCOUNTER
Call placed to MsYesenia Erica, she stated that the MD Dr. Coughlin referred her to cannot see her until July. I advised her that his nurse is gone for the day, but I will let both of them know. She also stated that she needs a new Rx for linzess sent to the pharmacy. She also asked in the mean time if she get serverely sick what should she do. I advised her that she should proceed to the ER if she is severely sick as Dr. Coughlin is not in the office this afternoon, and we are closed on the weekends. She verbalized understanding. No further issues noted.

## 2020-06-14 ENCOUNTER — HOSPITAL ENCOUNTER (EMERGENCY)
Facility: HOSPITAL | Age: 75
Discharge: HOME | End: 2020-06-14
Attending: EMERGENCY MEDICINE
Payer: MEDICARE

## 2020-06-14 VITALS
SYSTOLIC BLOOD PRESSURE: 114 MMHG | BODY MASS INDEX: 26.63 KG/M2 | DIASTOLIC BLOOD PRESSURE: 65 MMHG | TEMPERATURE: 98 F | WEIGHT: 160 LBS | OXYGEN SATURATION: 100 % | RESPIRATION RATE: 18 BRPM | HEART RATE: 71 BPM

## 2020-06-14 DIAGNOSIS — R10.11 RUQ ABDOMINAL PAIN: ICD-10-CM

## 2020-06-14 PROCEDURE — 99284 EMERGENCY DEPT VISIT MOD MDM: CPT | Mod: 25

## 2020-06-14 NOTE — ED PROVIDER NOTES
Encounter Date: 6/14/2020    SCRIBE #1 NOTE: ISol, am scribing for, and in the presence of, Antony Esteban MD.       History     Chief Complaint   Patient presents with    Flank Pain     right x several weeks     Time seen by provider: 8:43 AM on 06/14/2020    Erica Masterson is a 74 y.o. female with a PMHx of DM, gastritis (EGD in May 2020 with hiatal hernia) who presents to the ED with an onset of nausea and RUQ abdominal pain and right sided flank pain. She describes the pain as burning, and endures it mostly at night. She reports dark bowel movements. The patient has a history of multiple prior negative urine cultures.The patient saw a urologist 3 days ago, and had a negative UA, only showing microscopic hematuria. She also had a CT 5 days ago, showing hydronephrosis and some calcifications in retroperitoneal area, but no clearly evident uretal stones. Patient is scheduled for a bladder US and retrograde pyelogram in 3 days, and has an appointment with her urologist next month, but wants a sooner visit. The patient denies dysuria, fever, vomiting or any other symptoms at this time. PSHx of hysterectomy.       The history is provided by the patient.     Review of patient's allergies indicates:   Allergen Reactions    No known drug allergies      Past Medical History:   Diagnosis Date    Allergy     Anxiety     Cataract     Colon polyp     Degenerative arthritis of knee 4/3/2012    Diverticulosis     GERD (gastroesophageal reflux disease)     Hyperlipidemia     Hypertension     Multinodular goiter 3/26/2012    Myopathy, unspecified 1/18/2010    New onset type 2 diabetes mellitus 3/24/2020    New onset type 2 diabetes mellitus 3/24/2020    Tuberculosis      Past Surgical History:   Procedure Laterality Date    BREAST BIOPSY Left 2015    benign    COLONOSCOPY  12/2/15    Dr. Britton, multiple polyps, recheck five years-three years if more than 2 polyps are adenomatous     COLONOSCOPY N/A 12/2/2015    COLONOSCOPY N/A 3/20/2019    Procedure: COLONOSCOPY;  Surgeon: Jose Britton MD;  Location: [facility] ENDO;  Service: Endoscopy;  Laterality: N/A;    COLONOSCOPY N/A 5/20/2020    Dr. Britton; internal hemorrhoids; diverticulosis; polyps removed; repeat in 3 years    ESOPHAGOGASTRODUODENOSCOPY N/A 5/20/2020    Dr. Britton; small hiatal hernia; gastritis; 8 gastric polyps removed    FOOT SURGERY      HYSTERECTOMY      INTRALUMINAL GASTROINTESTINAL TRACT IMAGING VIA CAPSULE N/A 6/4/2020    Procedure: IMAGING PROCEDURE, GI TRACT, INTRALUMINAL, VIA CAPSULE;  Surgeon: Jose Britton MD;  Location: [facility] ENDO;  Service: Endoscopy;  Laterality: N/A;    KNEE SURGERY  06/06/2013    right knee tear Dr Iverson     LUNG LOBECTOMY  1966    right middle lobectomy, due to Tb    OOPHORECTOMY      SHOULDER SURGERY      francis      Family History   Problem Relation Age of Onset    Colon cancer Other     Cancer Mother     Hypertension Mother     Hyperlipidemia Mother     Cancer Brother     Hypertension Father     Emphysema Father     Diabetes Son     Hypertension Son     Alcohol abuse Son     Diabetes Maternal Aunt     Alzheimer's disease Maternal Uncle     Cancer Maternal Grandmother     No Known Problems Daughter     Dementia Sister     Alzheimer's disease Sister     Breast cancer Sister 60    Obesity Paternal Uncle     Prostate cancer Other     Melanoma Neg Hx     Psoriasis Neg Hx     Lupus Neg Hx     Eczema Neg Hx     Amblyopia Neg Hx     Blindness Neg Hx     Cataracts Neg Hx     Glaucoma Neg Hx     Macular degeneration Neg Hx     Retinal detachment Neg Hx     Strabismus Neg Hx     Stroke Neg Hx     Thyroid disease Neg Hx      Social History     Tobacco Use    Smoking status: Never Smoker    Smokeless tobacco: Never Used   Substance Use Topics    Alcohol use: Not Currently     Comment: stopped 2019    Drug use: No     Review of Systems   Constitutional:  Negative for fever.   HENT: Negative for sore throat.    Respiratory: Negative for shortness of breath.    Cardiovascular: Negative for chest pain.   Gastrointestinal: Positive for abdominal pain and nausea. Negative for vomiting.   Genitourinary: Positive for flank pain. Negative for dysuria.   Musculoskeletal: Negative for back pain.   Skin: Negative for rash.   Neurological: Negative for weakness.   Hematological: Does not bruise/bleed easily.       Physical Exam     Initial Vitals [06/14/20 0832]   BP Pulse Resp Temp SpO2   (!) 147/77 90 18 98 °F (36.7 °C) 100 %      MAP       --         Physical Exam    Nursing note and vitals reviewed.  Constitutional: She appears well-developed and well-nourished. She is not diaphoretic. No distress.   HENT:   Head: Normocephalic and atraumatic.   Mouth/Throat: Oropharynx is clear and moist.   Eyes: Conjunctivae are normal.   Neck: Neck supple.   Cardiovascular: Normal rate, regular rhythm, normal heart sounds and intact distal pulses. Exam reveals no gallop and no friction rub.    No murmur heard.  Pulmonary/Chest: Breath sounds normal. She has no wheezes. She has no rhonchi. She has no rales.   Abdominal: Soft. She exhibits no distension. There is abdominal tenderness in the right upper quadrant. There is no rebound, no guarding and negative Rashid's sign.   Minimal RUQ tenderness. No right flank tenderness. No back tenderness.   Musculoskeletal: Normal range of motion.   Neurological: She is alert and oriented to person, place, and time. She has normal strength.   5/5 strength with intact sensation to BUE's and BLE's.     Skin: No rash noted. No erythema.   Psychiatric: Her speech is normal.         ED Course   Procedures  Labs Reviewed - No data to display       Imaging Results          X-Ray Abdomen AP 1 View (KUB) (Final result)  Result time 06/14/20 10:13:54    Final result by Rei Arechiga Jr., MD (06/14/20 10:13:54)                 Impression:      Probable  phleboliths are noted on either side of the lower lumbar spine and sacrum but because of their position ureteral stone is not ruled out.  The rest of the abdomen x-ray is negative.      Electronically signed by: Rei Arechiga MD  Date:    06/14/2020  Time:    10:13             Narrative:    EXAMINATION:  XR ABDOMEN AP 1 VIEW    CLINICAL HISTORY:  Right upper quadrant pain    TECHNIQUE:  AP View(s) of the abdomen was performed.    COMPARISON:  Abdomen of May 12, 2020. CT abdomen and pelvis of June 9, 2020    FINDINGS:  The bowel gas pattern is within normal limits.  No free air is seen.  No masses  are identified.  Degenerative changes are noted at the lower lumbar spine.  The psoas shadows are sharp.  There are 2 oval calcifications to the right of the lumbar spine and 2 oval calcifications to the left of the lumbar spine with 1 over the sacrum which most likely represent phleboliths however because of their position ureteral stone is not ruled out.  These were noted on the prior CT scan.  Stones overlying either kidney are not identified                               US Retroperitoneal Complete (Final result)  Result time 06/14/20 09:38:34    Final result by Rei Arechiga Jr., MD (06/14/20 09:38:34)                 Impression:      No significant abnormality.  Hydronephrosis is not seen today      Electronically signed by: Rei Arechiga MD  Date:    06/14/2020  Time:    09:38             Narrative:    EXAMINATION:  US RETROPERITONEAL COMPLETE    CLINICAL HISTORY:  R flank/RUQ abdominal pain, eval kidneys and bladder;    TECHNIQUE:  Ultrasound of the kidneys and urinary bladder was performed including color flow and Doppler evaluation of the kidneys.    COMPARISON:  None.    FINDINGS:  Right kidney: The right kidney measures 9.1 cm. No cortical thinning. No loss of corticomedullary distinction. Resistive index measures 0.65.  No mass. No renal stone. No hydronephrosis.    Left kidney: The left kidney  measures 9.7 cm. No cortical thinning. No loss of corticomedullary distinction. Resistive index measures 0.68.  No mass. No renal stone. No hydronephrosis.    The bladder is nearly empty during this study                            (radiology reading, XR visualized by me)    The patient was informed of the incidental finding(s) as well as the need for PCP or specialist follow-up for reevaluation and possible further investigation or monitoring.    Bedside RUQ US (performed by with physical exam by Dr. Esteban):  Normal-appearing gallbladder with no distention, wall thickening, gallstones, or pericholecystic fluid or inflammation.         Medical Decision Making:   History:   Old Medical Records: I decided to obtain old medical records.  Clinical Tests:   Radiological Study: Ordered and Reviewed            Scribe Attestation:   Scribe #1: I performed the above scribed service and the documentation accurately describes the services I performed. I attest to the accuracy of the note.    I, Dr. Antony Esteban, personally performed the services described in this documentation. All medical record entries made by the scribe were at my direction and in my presence.  I have reviewed the chart and agree that the record reflects my personal performance and is accurate and complete. Antony Esteban MD.  9:12 AM 06/14/2020    Erica Masterson is a 74 y.o. female presenting with chronic right upper quadrant pain radiating to the right flank for over 2 months.  She has had extensive workup including previous laboratory work as well as CT imaging showing hydronephrosis and hydroureter proximally with ureteral calculus not ruled out secondary to calcifications in adjacent peritoneum.  She has already seen Urology for this who plan KUB x-ray and ultrasound of the kidney and bladder.  Plan is to discuss results on repeat assessment with decision to be made to pursue cystoscopy with retrograde pyelogram for further  investigation.  Based on extensive workup without change in symptoms and chronic nature of complaint, I have very low suspicion for acute, life-threatening intra-abdominal process such as abdominal aortic aneurysm, cholecystitis, abscess.  I doubt pancreatitis or acute hepatitis.  I do not think repeat labs or imaging are indicated.  She has no urinary complaints and multiple repeat urinalyses with negative urine culture.  I do not think there would be benefit in repeating this once again today.  I doubt other intra-abdominal emergent process such as perforated viscus, obstruction.  I do not think emergent surgical intervention or repeat CT is indicated.  Patient counseled on the results of KUB and ultrasound with instruction to follow-up with urology as planned.  I have review detailed, specific return precautions to the ED for change in condition.                        Clinical Impression:       ICD-10-CM ICD-9-CM   1. RUQ abdominal pain  R10.11 789.01         Disposition:   Disposition: Discharged  Condition: Stable     ED Disposition Condition    Discharge Stable        ED Prescriptions     None        Follow-up Information     Follow up With Specialties Details Why Contact Info    Follow up with urology as planned, call this week                                         Antony Esteban MD  06/14/20 [id number]

## 2020-06-14 NOTE — DISCHARGE INSTRUCTIONS
As we discussed, return to the emergency department for new or worsening symptoms including vomiting, fever, or worsening of pain.

## 2020-06-15 ENCOUNTER — TELEPHONE (OUTPATIENT)
Dept: NEUROLOGY | Facility: HOSPITAL | Age: 75
End: 2020-06-15

## 2020-06-15 ENCOUNTER — TELEPHONE (OUTPATIENT)
Dept: FAMILY MEDICINE | Facility: CLINIC | Age: 75
End: 2020-06-15

## 2020-06-15 ENCOUNTER — OFFICE VISIT (OUTPATIENT)
Dept: OPHTHALMOLOGY | Facility: CLINIC | Age: 75
End: 2020-06-15
Payer: MEDICARE

## 2020-06-15 DIAGNOSIS — H02.883 MEIBOMIAN GLAND DYSFUNCTION (MGD) OF BOTH EYES: ICD-10-CM

## 2020-06-15 DIAGNOSIS — H25.13 NUCLEAR SCLEROSIS, BILATERAL: ICD-10-CM

## 2020-06-15 DIAGNOSIS — H52.4 HYPEROPIA WITH ASTIGMATISM AND PRESBYOPIA, BILATERAL: ICD-10-CM

## 2020-06-15 DIAGNOSIS — H02.886 MEIBOMIAN GLAND DYSFUNCTION (MGD) OF BOTH EYES: ICD-10-CM

## 2020-06-15 DIAGNOSIS — H26.9 CORTICAL CATARACT OF BOTH EYES: ICD-10-CM

## 2020-06-15 DIAGNOSIS — E11.9 DIABETES MELLITUS TYPE 2 WITHOUT RETINOPATHY: ICD-10-CM

## 2020-06-15 DIAGNOSIS — H52.03 HYPEROPIA WITH ASTIGMATISM AND PRESBYOPIA, BILATERAL: ICD-10-CM

## 2020-06-15 DIAGNOSIS — H40.013 OPEN ANGLE WITH BORDERLINE FINDINGS AND LOW GLAUCOMA RISK IN BOTH EYES: Primary | ICD-10-CM

## 2020-06-15 DIAGNOSIS — H52.203 HYPEROPIA WITH ASTIGMATISM AND PRESBYOPIA, BILATERAL: ICD-10-CM

## 2020-06-15 PROCEDURE — 92014 PR EYE EXAM, EST PATIENT,COMPREHESV: ICD-10-PCS | Mod: S$GLB,,, | Performed by: OPHTHALMOLOGY

## 2020-06-15 PROCEDURE — 92015 PR REFRACTION: ICD-10-PCS | Mod: S$GLB,,, | Performed by: OPHTHALMOLOGY

## 2020-06-15 PROCEDURE — 92020 GONIOSCOPY: CPT | Mod: S$GLB,,, | Performed by: OPHTHALMOLOGY

## 2020-06-15 PROCEDURE — 99499 RISK ADDL DX/OHS AUDIT: ICD-10-PCS | Mod: S$GLB,,, | Performed by: OPHTHALMOLOGY

## 2020-06-15 PROCEDURE — 99999 PR PBB SHADOW E&M-EST. PATIENT-LVL III: ICD-10-PCS | Mod: PBBFAC,,, | Performed by: OPHTHALMOLOGY

## 2020-06-15 PROCEDURE — 99999 PR PBB SHADOW E&M-EST. PATIENT-LVL III: CPT | Mod: PBBFAC,,, | Performed by: OPHTHALMOLOGY

## 2020-06-15 PROCEDURE — 92014 COMPRE OPH EXAM EST PT 1/>: CPT | Mod: S$GLB,,, | Performed by: OPHTHALMOLOGY

## 2020-06-15 PROCEDURE — 92015 DETERMINE REFRACTIVE STATE: CPT | Mod: S$GLB,,, | Performed by: OPHTHALMOLOGY

## 2020-06-15 PROCEDURE — 92020 PR SPECIAL EYE EVAL,GONIOSCOPY: ICD-10-PCS | Mod: S$GLB,,, | Performed by: OPHTHALMOLOGY

## 2020-06-15 PROCEDURE — 99499 UNLISTED E&M SERVICE: CPT | Mod: S$GLB,,, | Performed by: OPHTHALMOLOGY

## 2020-06-15 NOTE — TELEPHONE ENCOUNTER
----- Message from Leslie Gilman sent at 6/15/2020  8:21 AM CDT -----  Regarding: returning call no message left for patient  Type:  Patient Returning Call  Who Called:  patient  Who Left Message for Patient:  not known  Does the patient know what this is regarding?:  no  Best Call Back Number:  [phone]  Additional Information:  na

## 2020-06-15 NOTE — PATIENT INSTRUCTIONS
What Are Cataracts?    A clear lens in the eye focuses light. This lets the eye see images sharply. With age, the lens slowly becomes cloudy. The cloudy lens is a cataract. A cataract scatters light and makes it hard for the eye to focus. Cataracts often form in both eyes. But one lens may cloud faster than the other.  The aging of your lens  Your lens may cloud so slowly that you don`t notice any vision changes at first. But as the cataract gets worse, the eye has a harder time focusing. In early stages, glasses may help you see better. As the lens gets more cloudy, your doctor may recommend surgery to restore your vision.  A clear lens allows your eye to bring objects sharply into focus.  A mild cataract may slightly blur your vision.  A dense cataract can severely blur your vision.  Date Last Reviewed: 6/14/2015  © 7611-5932 [employer] [address] [place] [address], [place] [address]. All rights reserved. This information is not intended as a substitute for professional medical care. Always follow your healthcare professional's instructions.

## 2020-06-15 NOTE — TELEPHONE ENCOUNTER
----- Message from Della Schultz sent at 6/15/2020  4:49 PM CDT -----  Contact: [phone] or [phone] /Self  GI  Patient is requesting to speak with nurse regarding scheduling a f/u  appointment. Please advise.

## 2020-06-15 NOTE — PROGRESS NOTES
"HPI     DLS: 12/10/19  Pt here for MRX BAT GONIO IOP DFE. Denies pain/ FOL/ no new floaters.   States OU feels tight sometimes. Using soothe OU qam.     Last edited by Karyn Burgos on 6/15/2020 10:06 AM. (History)        ROS     Negative for: Constitutional, Gastrointestinal, Neurological, Skin,   Genitourinary, Musculoskeletal, HENT, Endocrine, Cardiovascular, Eyes,   Respiratory, Psychiatric, Allergic/Imm, Heme/Lymph    Last edited by Celso Reynoso Jr., MD on 6/15/2020 10:31 AM. (History)        Assessment /Plan     For exam results, see Encounter Report.    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 Pt was followed before for glaucoma suspect by Dr. Stoddard, testing done back in 2006.    IOP WNL again today (17 OU)   No known family history.    Previous HRT suggests some RNFL thinning OD; most recent with poor alignment OD, OS stable/WNL    HVF OS few random misses "borderline"; OD WNL   OCT nerve remains WNL OU.   CCT slightly thin   Last gonio open to TM, with thin SS with compression OU.  ; gonio 6/15/20 open to SS OU x 360  Angles appear to open iris appears fairly flat; IOP OD 16 OS 15  Continue to monitor as a suspect    Nuclear sclerosis, bilateral - approaching visual significance, OD looks farther along than OS today. No longer drives after 5 pm, not comfortable driving at night, has missed a turn. To do surgery on only one eye would leave her with anisometropia. Dilates only to ~6.4 mm, despite denies flomax.     Cortical cataract of both eyes - "    Concretions removed from bilateral upper lids 8/27/19 with 30g needle. EES BID.   MGD RLL - continue warm " compresses, lid hygiene and PO Flaxseed. Nighttime liqui-gel did not seem to help much. Continue frequent artificial tears.    -no retinopathy seen on DFE today  -continue good blood pressure and glucose control  -F/U for yearly DFE      Hyperopia with astigmatism and presbyopia, bilateral - MRx given prior visit.    Follow up in about 6 months (around 12/15/2020) for f/u glaucoma suspect and cataract.

## 2020-06-16 ENCOUNTER — TELEPHONE (OUTPATIENT)
Dept: GASTROENTEROLOGY | Facility: CLINIC | Age: 75
End: 2020-06-16

## 2020-06-16 NOTE — TELEPHONE ENCOUNTER
----- Message from Lesley Fournier sent at 6/16/2020  4:07 PM CDT -----  Regarding: advice   Type: Needs Medical Advice  Who Called:  pt  Symptoms (please be specific):    cramping  and  diarrhea  due to  meds    How long has patient had these symptoms:   several  days  Pharmacy name and phone #:  [id number] - [place] - [address]  [place] [address]  Phone: [phone] Fax: [phone]     Additional Information: #  please call   for details

## 2020-06-17 ENCOUNTER — HOSPITAL ENCOUNTER (OUTPATIENT)
Dept: RADIOLOGY | Facility: HOSPITAL | Age: 75
Discharge: HOME | End: 2020-06-17
Attending: FAMILY MEDICINE
Payer: MEDICARE

## 2020-06-17 ENCOUNTER — DOCUMENTATION ONLY (OUTPATIENT)
Dept: NEUROLOGY | Facility: HOSPITAL | Age: 75
End: 2020-06-17

## 2020-06-17 DIAGNOSIS — R92.8 ABNORMAL MAMMOGRAM OF RIGHT BREAST: ICD-10-CM

## 2020-06-17 PROCEDURE — 88305 TISSUE EXAM BY PATHOLOGIST: CPT | Mod: 26,,, | Performed by: PATHOLOGY

## 2020-06-17 PROCEDURE — A4648 IMPLANTABLE TISSUE MARKER: HCPCS

## 2020-06-17 PROCEDURE — 19081 BX BREAST 1ST LESION STRTCTC: CPT | Mod: RT,,, | Performed by: SURGERY

## 2020-06-17 PROCEDURE — 88305 TISSUE EXAM BY PATHOLOGIST: CPT | Performed by: PATHOLOGY

## 2020-06-17 PROCEDURE — 76098 X-RAY EXAM SURGICAL SPECIMEN: CPT | Mod: TC

## 2020-06-17 PROCEDURE — 25000003 PHARM REV CODE 250: Performed by: FAMILY MEDICINE

## 2020-06-17 PROCEDURE — 19081 MAMMO BREAST STEREOTACTIC BREAST BIOPSY RIGHT: ICD-10-PCS | Mod: RT,,, | Performed by: SURGERY

## 2020-06-17 PROCEDURE — 88305 TISSUE EXAM BY PATHOLOGIST: ICD-10-PCS | Mod: 26,,, | Performed by: PATHOLOGY

## 2020-06-17 RX ORDER — LIDOCAINE HYDROCHLORIDE AND EPINEPHRINE 10; 10 MG/ML; UG/ML
30 INJECTION, SOLUTION INFILTRATION; PERINEURAL ONCE
Status: DISCONTINUED | OUTPATIENT
Start: 2020-06-17 | End: 2020-06-17

## 2020-06-17 RX ADMIN — LIDOCAINE HYDROCHLORIDE 30 ML: 10; .005 INJECTION, SOLUTION EPIDURAL; INFILTRATION; INTRACAUDAL; PERINEURAL at 12:06

## 2020-06-17 NOTE — PROGRESS NOTES
Video capsule reviewed independently. There is a small,questionable angioectasia in the proximal-mid jejunum as well as a very small amount of blood in this segment (few drops of marroon blood). If this patient has NEMO, a bleeding angioectasia in the stomach, duodenum or jejunum is likely.   Plan clinic visit then likely future upper single balloon   Patient of Dr. Britton

## 2020-06-17 NOTE — TELEPHONE ENCOUNTER
Patient notified she will need to give the Linzess a couple of weeks to work and get in her system. And the diarrhea should subside to call back if after a couple of weeks if still having symptoms.

## 2020-06-19 ENCOUNTER — OFFICE VISIT (OUTPATIENT)
Dept: UROLOGY | Facility: CLINIC | Age: 75
End: 2020-06-19
Payer: MEDICARE

## 2020-06-19 VITALS
WEIGHT: 155.44 LBS | DIASTOLIC BLOOD PRESSURE: 74 MMHG | SYSTOLIC BLOOD PRESSURE: 127 MMHG | HEART RATE: 88 BPM | HEIGHT: 65 IN | BODY MASS INDEX: 25.9 KG/M2

## 2020-06-19 DIAGNOSIS — R31.29 MICROSCOPIC HEMATURIA: ICD-10-CM

## 2020-06-19 DIAGNOSIS — Z87.448 HISTORY OF HYDRONEPHROSIS: Primary | ICD-10-CM

## 2020-06-19 LAB
FINAL PATHOLOGIC DIAGNOSIS: NORMAL
GROSS: NORMAL

## 2020-06-19 PROCEDURE — 1159F MED LIST DOCD IN RCRD: CPT | Mod: S$GLB,,, | Performed by: UROLOGY

## 2020-06-19 PROCEDURE — 96374 THER/PROPH/DIAG INJ IV PUSH: CPT

## 2020-06-19 PROCEDURE — 3074F SYST BP LT 130 MM HG: CPT | Mod: CPTII,S$GLB,, | Performed by: UROLOGY

## 2020-06-19 PROCEDURE — 1101F PT FALLS ASSESS-DOCD LE1/YR: CPT | Mod: CPTII,S$GLB,, | Performed by: UROLOGY

## 2020-06-19 PROCEDURE — 3074F PR MOST RECENT SYSTOLIC BLOOD PRESSURE < 130 MM HG: ICD-10-PCS | Mod: CPTII,S$GLB,, | Performed by: UROLOGY

## 2020-06-19 PROCEDURE — 3078F PR MOST RECENT DIASTOLIC BLOOD PRESSURE < 80 MM HG: ICD-10-PCS | Mod: CPTII,S$GLB,, | Performed by: UROLOGY

## 2020-06-19 PROCEDURE — 3078F DIAST BP <80 MM HG: CPT | Mod: CPTII,S$GLB,, | Performed by: UROLOGY

## 2020-06-19 PROCEDURE — 1101F PR PT FALLS ASSESS DOC 0-1 FALLS W/OUT INJ PAST YR: ICD-10-PCS | Mod: CPTII,S$GLB,, | Performed by: UROLOGY

## 2020-06-19 PROCEDURE — 99999 PR PBB SHADOW E&M-EST. PATIENT-LVL III: ICD-10-PCS | Mod: PBBFAC,,, | Performed by: UROLOGY

## 2020-06-19 PROCEDURE — 99215 OFFICE O/P EST HI 40 MIN: CPT | Mod: S$GLB,,, | Performed by: UROLOGY

## 2020-06-19 PROCEDURE — 1159F PR MEDICATION LIST DOCUMENTED IN MEDICAL RECORD: ICD-10-PCS | Mod: S$GLB,,, | Performed by: UROLOGY

## 2020-06-19 PROCEDURE — 99284 EMERGENCY DEPT VISIT MOD MDM: CPT | Mod: 25

## 2020-06-19 PROCEDURE — 99215 PR OFFICE/OUTPT VISIT, EST, LEVL V, 40-54 MIN: ICD-10-PCS | Mod: S$GLB,,, | Performed by: UROLOGY

## 2020-06-19 PROCEDURE — 99999 PR PBB SHADOW E&M-EST. PATIENT-LVL III: CPT | Mod: PBBFAC,,, | Performed by: UROLOGY

## 2020-06-19 NOTE — PROGRESS NOTES
[facility] New Patient/H&P:    Erica Masterson is a 74 y.o. female who presents as a NEW patient to me for right hydronephrosis.    Patient recently evaluated by Ellyn Jean-Baptiste for right hydronephrosis.     She underwent CT abd/pelvis with contrast on 6/9/20 for epigastric pain and was incidentally found to have mild right hydronephrosis and right phleboliths.     US renal on 6/14/20 was negative for any hydronephrosis.    She continues to have mild right lower quadrant abdominal pain.   Of note, her most recent UA micro showed microscopic hematuria.     She denies any fever, chills, bone pain, weight loss,  trauma or history of  malignancy.       Urine culture  No growth 6/11/20  No growth 6/4/20  No growth 5/15/20  No growth 4/30/20      Urine cytology  Negative 6/11/20      Past Medical History:   Diagnosis Date    Allergy     Anxiety     Cataract     Colon polyp     Degenerative arthritis of knee 4/3/2012    Diverticulosis     GERD (gastroesophageal reflux disease)     Hyperlipidemia     Hypertension     Multinodular goiter 3/26/2012    Myopathy, unspecified 1/18/2010    New onset type 2 diabetes mellitus 3/24/2020    New onset type 2 diabetes mellitus 3/24/2020    Tuberculosis        Past Surgical History:   Procedure Laterality Date    BREAST BIOPSY Left 2015    benign    COLONOSCOPY  12/2/15    Dr. Britton, multiple polyps, recheck five years-three years if more than 2 polyps are adenomatous    COLONOSCOPY N/A 12/2/2015    COLONOSCOPY N/A 3/20/2019    Procedure: COLONOSCOPY;  Surgeon: Jose Britton MD;  Location: [facility];  Service: Endoscopy;  Laterality: N/A;    COLONOSCOPY N/A 5/20/2020    Dr. Britton; internal hemorrhoids; diverticulosis; polyps removed; repeat in 3 years    ESOPHAGOGASTRODUODENOSCOPY N/A 5/20/2020    Dr. Britton; small hiatal hernia; gastritis; 8 gastric polyps removed    FOOT SURGERY      HYSTERECTOMY      INTRALUMINAL GASTROINTESTINAL  TRACT IMAGING VIA CAPSULE N/A 6/4/2020    Procedure: IMAGING PROCEDURE, GI TRACT, INTRALUMINAL, VIA CAPSULE;  Surgeon: Jose Britton MD;  Location: [facility] ENDO;  Service: Endoscopy;  Laterality: N/A;    KNEE SURGERY  06/06/2013    right knee tear Dr Iverson     LUNG LOBECTOMY  1966    right middle lobectomy, due to Tb    OOPHORECTOMY      SHOULDER SURGERY      francis        Family History   Problem Relation Age of Onset    Colon cancer Other     Cancer Mother     Hypertension Mother     Hyperlipidemia Mother     Cancer Brother     Hypertension Father     Emphysema Father     Diabetes Son     Hypertension Son     Alcohol abuse Son     Diabetes Maternal Aunt     Alzheimer's disease Maternal Uncle     Cancer Maternal Grandmother     No Known Problems Daughter     Dementia Sister     Alzheimer's disease Sister     Breast cancer Sister 60    Obesity Paternal Uncle     Prostate cancer Other     Melanoma Neg Hx     Psoriasis Neg Hx     Lupus Neg Hx     Eczema Neg Hx     Amblyopia Neg Hx     Blindness Neg Hx     Cataracts Neg Hx     Glaucoma Neg Hx     Macular degeneration Neg Hx     Retinal detachment Neg Hx     Strabismus Neg Hx     Stroke Neg Hx     Thyroid disease Neg Hx        Social History     Socioeconomic History    Marital status:      Spouse name: Not on file    Number of children: Not on file    Years of education: Not on file    Highest education level: Not on file   Occupational History    Not on file   Social Needs    Financial resource strain: Not on file    Food insecurity     Worry: Not on file     Inability: Not on file    Transportation needs     Medical: Not on file     Non-medical: Not on file   Tobacco Use    Smoking status: Never Smoker    Smokeless tobacco: Never Used   Substance and Sexual Activity    Alcohol use: Not Currently     Comment: stopped 2019    Drug use: No    Sexual activity: Not Currently   Lifestyle    Physical activity     Days  "per week: Not on file     Minutes per session: Not on file    Stress: Not on file   Relationships    Social connections     Talks on phone: Not on file     Gets together: Not on file     Attends Oriental orthodox service: Not on file     Active member of club or organization: Not on file     Attends meetings of clubs or organizations: Not on file     Relationship status: Not on file   Other Topics Concern    Are you pregnant or think you may be? Not Asked    Breast-feeding Not Asked   Social History Narrative    Not on file       Review of patient's allergies indicates:   Allergen Reactions    No known drug allergies        Medications Reviewed: see MAR    ROS:    Constitutional: denies fevers, chills, night sweats, fatigue, malaise  Respiratory: negative for cough, shortness of breath, wheezing, dyspnea.  Cardiovascular: - for high blood pressure, negative for chest pain, varicose veins, ankle swelling, palpitations, syncope.  GI: negative for abdominal pain, heartburn, indigestion, nausea, vomiting, constipation, diarrhea, blood in stool.   Urology: as noted above in HPI  Endocrinology: negative for cold intolerance, excessive thirst, not feeling tired/sluggish, no heat intolerance.   Hematology/Lymph: negative for easy bleeding, easy bruising, swollen glands.  Musculoskeletal: negative for back pain, joint pain, joint swelling, neck pain.  Allergy-Immunology: negative for seasonal allergies, negative for unusual infections.   Skin: negative for boils, breast lumps, hives, itching, rash.   Neurology: negative for, dizziness, headache, tingling/numbness, tremors.   Psych: satisfied with life; negative for, anxiety, depression, suicidal thoughts.     PHYSICAL EXAM:    Vitals:    06/19/20 1322   BP: 127/74   Pulse: 88     Body mass index is 25.86 kg/m². Weight: 70.5 kg (155 lb 6.8 oz) Height: 5' 5" (165.1 cm)       General: Alert, cooperative, no distress, appears stated age  Head: Normocephalic, without obvious " abnormality, atraumatic  Neck: no masses, no thyromegaly, no lymphadenopathy  Eyes: PERRL, conjunctiva/corneas clear  Lungs: Respirations unlabored, normal effort, no accessory muscle use  CV: Warm and well perfused extremities  Abdomen: Soft, non-tender, no CVA tenderness, no hepatosplenomegaly, no hernia  Extremities: Extremities normal, atraumatic, no cyanosis or edema  Skin: Normal color, texture, and turgor, no rashes or lesions  Psych: Appropriate, well oriented, normal affect, normal mood  Neuro: Non-focal      LABS:    Recent Results (from the past 336 hour(s))   CREATININE, SERUM    Collection Time: 06/09/20  8:54 AM   Result Value Ref Range    Creatinine 1.5 (H) 0.5 - 1.4 mg/dL    eGFR if African American 39 (A) >60 mL/min/1.73 m^2    eGFR if non African American 34 (A) >60 mL/min/1.73 m^2   POCT URINE DIPSTICK WITHOUT MICROSCOPE    Collection Time: 06/11/20  8:59 AM   Result Value Ref Range    Color, UA Yellow     Spec Grav UA 1.030     pH, UA 5     WBC, UA neg     Nitrite, UA neg     Protein neg     Glucose, UA neg     Ketones, UA neg     Urobilinogen, UA 0.2     Bilirubin neg     Blood, UA mod     Clarity, UA Clear    Urine culture    Collection Time: 06/11/20 11:17 AM    Specimen: Urine, Clean Catch   Result Value Ref Range    Urine Culture, Routine No growth    Urinalysis Microscopic    Collection Time: 06/11/20 11:17 AM   Result Value Ref Range    RBC, UA 14 (H) 0 - 4 /hpf    WBC, UA 2 0 - 5 /hpf    Bacteria Occasional None-Occ /hpf    Squam Epithel, UA 1 /hpf    Hyaline Casts, UA 5 (A) 0-1/lpf /lpf    Microscopic Comment SEE COMMENT    Urinalysis    Collection Time: 06/11/20 11:17 AM   Result Value Ref Range    Specimen UA Urine, Clean Catch     Color, UA Yellow Yellow, Straw, Peg    Appearance, UA Clear Clear    pH, UA 5.0 5.0 - 8.0    Specific Gravity, UA >=1.030 (A) 1.005 - 1.030    Protein, UA Negative Negative    Glucose, UA Negative Negative    Ketones, UA Negative Negative    Bilirubin (UA)  Negative Negative    Occult Blood UA 1+ (A) Negative    Nitrite, UA Negative Negative    Urobilinogen, UA Negative <2.0 EU/dL    Leukocytes, UA Negative Negative   Cytology, Urine    Collection Time: 06/11/20 11:17 AM   Result Value Ref Range    Final Pathologic Diagnosis       Negative for malignant cells.  Few benign squamous and urothelial cells.         IMAGING:    US renal  6/14/20 Images independently reviewed by me    No significant abnormality.  Hydronephrosis is not seen today      Assessment/Diagnosis:    1. History of hydronephrosis     2. Microscopic hematuria         Plans:    - I spent 40 minutes with the patient; more than 50% was in counseling about the disease process and methods of treatment. Extensive discussion with patient regarding her hydronephrosis on CT scan that has since resolved. We discussed that her urine cultures and urine cytology are negative. She may have passed a small stone, however it us unclear. We discussed that she also had a small amount of blood in her urine on micro. Offered cystoscopy to complete hematuria evaluation, but patient elected to defer at this time given the negative work up thus far.   - RTC PRN.

## 2020-06-20 ENCOUNTER — HOSPITAL ENCOUNTER (EMERGENCY)
Facility: HOSPITAL | Age: 75
Discharge: HOME | End: 2020-06-20
Attending: EMERGENCY MEDICINE
Payer: MEDICARE

## 2020-06-20 VITALS
WEIGHT: 155 LBS | HEIGHT: 65 IN | DIASTOLIC BLOOD PRESSURE: 71 MMHG | TEMPERATURE: 98 F | BODY MASS INDEX: 25.83 KG/M2 | HEART RATE: 58 BPM | OXYGEN SATURATION: 100 % | RESPIRATION RATE: 18 BRPM | SYSTOLIC BLOOD PRESSURE: 151 MMHG

## 2020-06-20 DIAGNOSIS — R10.9 ABDOMINAL PAIN, UNSPECIFIED ABDOMINAL LOCATION: Primary | ICD-10-CM

## 2020-06-20 LAB
ALBUMIN SERPL BCP-MCNC: 4.2 G/DL (ref 3.5–5.2)
ALP SERPL-CCNC: 47 U/L (ref 55–135)
ALT SERPL W/O P-5'-P-CCNC: 22 U/L (ref 10–44)
ANION GAP SERPL CALC-SCNC: 11 MMOL/L (ref 8–16)
APTT PPP: 34.8 SEC (ref 23.6–33.3)
AST SERPL-CCNC: 19 U/L (ref 10–40)
BASOPHILS # BLD AUTO: 0.05 K/UL (ref 0–0.2)
BASOPHILS NFR BLD: 0.7 % (ref 0–1.9)
BILIRUB SERPL-MCNC: 0.7 MG/DL (ref 0.1–1)
BUN SERPL-MCNC: 18 MG/DL (ref 8–23)
CALCIUM SERPL-MCNC: 9.2 MG/DL (ref 8.7–10.5)
CHLORIDE SERPL-SCNC: 101 MMOL/L (ref 95–110)
CO2 SERPL-SCNC: 27 MMOL/L (ref 23–29)
CREAT SERPL-MCNC: 1.1 MG/DL (ref 0.5–1.4)
DIFFERENTIAL METHOD: ABNORMAL
EOSINOPHIL # BLD AUTO: 0.2 K/UL (ref 0–0.5)
EOSINOPHIL NFR BLD: 2.6 % (ref 0–8)
ERYTHROCYTE [DISTWIDTH] IN BLOOD BY AUTOMATED COUNT: 13.1 % (ref 11.5–14.5)
EST. GFR  (AFRICAN AMERICAN): 56.4 ML/MIN/1.73 M^2
EST. GFR  (NON AFRICAN AMERICAN): 48.9 ML/MIN/1.73 M^2
GLUCOSE SERPL-MCNC: 115 MG/DL (ref 70–110)
HCT VFR BLD AUTO: 38 % (ref 37–48.5)
HGB BLD-MCNC: 12 G/DL (ref 12–16)
IMM GRANULOCYTES # BLD AUTO: 0.02 K/UL (ref 0–0.04)
IMM GRANULOCYTES NFR BLD AUTO: 0.3 % (ref 0–0.5)
INR PPP: 1.3
LIPASE SERPL-CCNC: 27 U/L (ref 4–60)
LYMPHOCYTES # BLD AUTO: 2.6 K/UL (ref 1–4.8)
LYMPHOCYTES NFR BLD: 37.4 % (ref 18–48)
MCH RBC QN AUTO: 28.9 PG (ref 27–31)
MCHC RBC AUTO-ENTMCNC: 31.6 G/DL (ref 32–36)
MCV RBC AUTO: 92 FL (ref 82–98)
MONOCYTES # BLD AUTO: 0.4 K/UL (ref 0.3–1)
MONOCYTES NFR BLD: 6 % (ref 4–15)
NEUTROPHILS # BLD AUTO: 3.7 K/UL (ref 1.8–7.7)
NEUTROPHILS NFR BLD: 53 % (ref 38–73)
NRBC BLD-RTO: 0 /100 WBC
OB PNL STL: NEGATIVE
PLATELET # BLD AUTO: 290 K/UL (ref 150–350)
PMV BLD AUTO: 9.4 FL (ref 9.2–12.9)
POTASSIUM SERPL-SCNC: 3.7 MMOL/L (ref 3.5–5.1)
PROT SERPL-MCNC: 7.5 G/DL (ref 6–8.4)
PROTHROMBIN TIME: 15.7 SEC (ref 10.6–14.8)
RBC # BLD AUTO: 4.15 M/UL (ref 4–5.4)
SODIUM SERPL-SCNC: 139 MMOL/L (ref 136–145)
WBC # BLD AUTO: 6.95 K/UL (ref 3.9–12.7)

## 2020-06-20 PROCEDURE — 85610 PROTHROMBIN TIME: CPT

## 2020-06-20 PROCEDURE — 85730 THROMBOPLASTIN TIME PARTIAL: CPT

## 2020-06-20 PROCEDURE — 63600175 PHARM REV CODE 636 W HCPCS: Performed by: EMERGENCY MEDICINE

## 2020-06-20 PROCEDURE — 80053 COMPREHEN METABOLIC PANEL: CPT

## 2020-06-20 PROCEDURE — 85025 COMPLETE CBC W/AUTO DIFF WBC: CPT

## 2020-06-20 PROCEDURE — 83690 ASSAY OF LIPASE: CPT

## 2020-06-20 PROCEDURE — 82272 OCCULT BLD FECES 1-3 TESTS: CPT

## 2020-06-20 RX ORDER — HYOSCYAMINE SULFATE 0.125 MG
125 TABLET ORAL EVERY 4 HOURS PRN
Qty: 12 TABLET | Refills: 1 | Status: SHIPPED | OUTPATIENT
Start: 2020-06-20 | End: 2020-06-30 | Stop reason: SINTOL

## 2020-06-20 RX ORDER — ONDANSETRON 2 MG/ML
4 INJECTION INTRAMUSCULAR; INTRAVENOUS
Status: COMPLETED | OUTPATIENT
Start: 2020-06-20 | End: 2020-06-20

## 2020-06-20 RX ORDER — ONDANSETRON 4 MG/1
4 TABLET, ORALLY DISINTEGRATING ORAL EVERY 6 HOURS PRN
Qty: 12 TABLET | Refills: 0 | Status: SHIPPED | OUTPATIENT
Start: 2020-06-20 | End: 2020-06-26 | Stop reason: SDUPTHER

## 2020-06-20 RX ADMIN — ONDANSETRON 4 MG: 2 INJECTION INTRAMUSCULAR; INTRAVENOUS at 06:06

## 2020-06-20 NOTE — ED PROVIDER NOTES
"Encounter Date: 6/19/2020       History     Chief Complaint   Patient presents with    Abdominal Pain    Nausea     "belch tastes like blood"     Chief complaint is abdominal pain.  She states it hurts in epigastric area.  She is also nauseated.  The patient's abdominal symptoms date back to late May when she was noted to have anemia.  A pill cam showed questionable bleeding in the small intestine.  She the present time is trying to get things set up to do colonoscopy.  She complains of dorsal to let appears to have the jelly consistency.  She states she can smell the blood when she belches.  She states she has lost a few lb as well.  Her chart reviewed she has a history of GERD diabetes hypertension anxiety.        Review of patient's allergies indicates:   Allergen Reactions    No known drug allergies      Past Medical History:   Diagnosis Date    Allergy     Anxiety     Cataract     Colon polyp     Degenerative arthritis of knee 4/3/2012    Diverticulosis     GERD (gastroesophageal reflux disease)     Hyperlipidemia     Hypertension     Multinodular goiter 3/26/2012    Myopathy, unspecified 1/18/2010    New onset type 2 diabetes mellitus 3/24/2020    New onset type 2 diabetes mellitus 3/24/2020    Tuberculosis      Past Surgical History:   Procedure Laterality Date    BREAST BIOPSY Left 2015    benign    COLONOSCOPY  12/2/15    Dr. Britton, multiple polyps, recheck five years-three years if more than 2 polyps are adenomatous    COLONOSCOPY N/A 12/2/2015    COLONOSCOPY N/A 3/20/2019    Procedure: COLONOSCOPY;  Surgeon: Jose Britton MD;  Location: [facility];  Service: Endoscopy;  Laterality: N/A;    COLONOSCOPY N/A 5/20/2020    Dr. Britton; internal hemorrhoids; diverticulosis; polyps removed; repeat in 3 years    ESOPHAGOGASTRODUODENOSCOPY N/A 5/20/2020    Dr. Britton; small hiatal hernia; gastritis; 8 gastric polyps removed    FOOT SURGERY      HYSTERECTOMY      INTRALUMINAL " GASTROINTESTINAL TRACT IMAGING VIA CAPSULE N/A 6/4/2020    Procedure: IMAGING PROCEDURE, GI TRACT, INTRALUMINAL, VIA CAPSULE;  Surgeon: Jose Britton MD;  Location: [facility];  Service: Endoscopy;  Laterality: N/A;    KNEE SURGERY  06/06/2013    right knee tear Dr Iverson     LUNG LOBECTOMY  1966    right middle lobectomy, due to Tb    OOPHORECTOMY      SHOULDER SURGERY      francis      Family History   Problem Relation Age of Onset    Colon cancer Other     Cancer Mother     Hypertension Mother     Hyperlipidemia Mother     Cancer Brother     Hypertension Father     Emphysema Father     Diabetes Son     Hypertension Son     Alcohol abuse Son     Diabetes Maternal Aunt     Alzheimer's disease Maternal Uncle     Cancer Maternal Grandmother     No Known Problems Daughter     Dementia Sister     Alzheimer's disease Sister     Breast cancer Sister 60    Obesity Paternal Uncle     Prostate cancer Other     Melanoma Neg Hx     Psoriasis Neg Hx     Lupus Neg Hx     Eczema Neg Hx     Amblyopia Neg Hx     Blindness Neg Hx     Cataracts Neg Hx     Glaucoma Neg Hx     Macular degeneration Neg Hx     Retinal detachment Neg Hx     Strabismus Neg Hx     Stroke Neg Hx     Thyroid disease Neg Hx      Social History     Tobacco Use    Smoking status: Never Smoker    Smokeless tobacco: Never Used   Substance Use Topics    Alcohol use: Not Currently     Comment: stopped 2019    Drug use: No     Review of Systems   Constitutional: Negative for chills and fever.   HENT: Negative for ear pain, rhinorrhea and sore throat.    Eyes: Negative for pain and visual disturbance.   Respiratory: Negative for cough and shortness of breath.    Cardiovascular: Negative for chest pain and palpitations.   Gastrointestinal: Positive for abdominal pain and nausea. Negative for constipation, diarrhea and vomiting.   Genitourinary: Negative for dysuria, frequency, hematuria and urgency.   Musculoskeletal: Negative  for back pain, joint swelling and myalgias.   Skin: Negative for rash.   Neurological: Negative for dizziness, seizures, weakness and headaches.   Psychiatric/Behavioral: Negative for dysphoric mood. The patient is not nervous/anxious.        Physical Exam     Initial Vitals [06/19/20 2337]   BP Pulse Resp Temp SpO2   (!) 179/82 70 20 98.5 °F (36.9 °C) 100 %      MAP       --         Physical Exam    Nursing note and vitals reviewed.  Constitutional: She appears well-developed and well-nourished.   HENT:   Head: Normocephalic and atraumatic.   Eyes: Conjunctivae, EOM and lids are normal. Pupils are equal, round, and reactive to light.   Neck: Trachea normal and normal range of motion. Neck supple. No thyroid mass and no thyromegaly present.   Cardiovascular: Normal rate, regular rhythm and normal heart sounds.   Pulmonary/Chest: Effort normal and breath sounds normal.   Abdominal: Soft. Normal appearance and bowel sounds are normal. There is abdominal tenderness.   Mild tenderness in epigastric area   Musculoskeletal: Normal range of motion.   Neurological: She is alert and oriented to person, place, and time. She has normal strength and normal reflexes. No cranial nerve deficit or sensory deficit.   Skin: Skin is warm and dry.   Psychiatric: She has a normal mood and affect. Her speech is normal and behavior is normal. Judgment and thought content normal.         ED Course   Procedures  Labs Reviewed   CBC W/ AUTO DIFFERENTIAL   COMPREHENSIVE METABOLIC PANEL   APTT   PROTIME-INR          Imaging Results    None          Medical Decision Making:   ED Management:  Patient's labs were done and are reasonably normal.  The patient complains of nausea and intermittent spasms in epigastric area.  I will discharge her with Levsin and Zofran and encouraged her to get her GI follow-up as soon as possible which I think she can                                 Clinical Impression:       ICD-10-CM ICD-9-CM   1. Abdominal pain,  unspecified abdominal location  R10.9 789.00                                Carli Ambrocio MD  06/20/20 0634       Carli Ambrocio MD  06/20/20 0637       Carli Ambrocio MD  06/20/20 0649

## 2020-06-20 NOTE — DISCHARGE INSTRUCTIONS
Please work toward getting the colonoscopy as needed to evaluate your anemia.  Please return for worsening symptoms weakness vomiting blood black tarry stool

## 2020-06-23 ENCOUNTER — PROCEDURE VISIT (OUTPATIENT)
Dept: NEUROLOGY | Facility: CLINIC | Age: 75
End: 2020-06-23
Payer: MEDICARE

## 2020-06-23 DIAGNOSIS — R20.2 PARESTHESIA: ICD-10-CM

## 2020-06-23 PROCEDURE — 95911 NRV CNDJ TEST 9-10 STUDIES: CPT | Mod: S$GLB,,, | Performed by: PSYCHIATRY & NEUROLOGY

## 2020-06-23 PROCEDURE — 95886 MUSC TEST DONE W/N TEST COMP: CPT | Mod: S$GLB,,, | Performed by: PSYCHIATRY & NEUROLOGY

## 2020-06-23 PROCEDURE — 95886 PR EMG COMPLETE, W/ NERVE CONDUCTION STUDIES, 5+ MUSCLES: ICD-10-PCS | Mod: S$GLB,,, | Performed by: PSYCHIATRY & NEUROLOGY

## 2020-06-23 PROCEDURE — 95911 PR NERVE CONDUCTION STUDY; 9-10 STUDIES: ICD-10-PCS | Mod: S$GLB,,, | Performed by: PSYCHIATRY & NEUROLOGY

## 2020-06-24 ENCOUNTER — OFFICE VISIT (OUTPATIENT)
Dept: GASTROENTEROLOGY | Facility: CLINIC | Age: 75
End: 2020-06-24
Payer: MEDICARE

## 2020-06-24 ENCOUNTER — OFFICE VISIT (OUTPATIENT)
Dept: NEUROLOGY | Facility: HOSPITAL | Age: 75
End: 2020-06-24
Attending: INTERNAL MEDICINE
Payer: MEDICARE

## 2020-06-24 ENCOUNTER — TELEPHONE (OUTPATIENT)
Dept: RADIOLOGY | Facility: HOSPITAL | Age: 75
End: 2020-06-24

## 2020-06-24 ENCOUNTER — TELEPHONE (OUTPATIENT)
Dept: GASTROENTEROLOGY | Facility: CLINIC | Age: 75
End: 2020-06-24

## 2020-06-24 ENCOUNTER — TELEPHONE (OUTPATIENT)
Dept: NEUROLOGY | Facility: HOSPITAL | Age: 75
End: 2020-06-24

## 2020-06-24 VITALS
HEART RATE: 68 BPM | SYSTOLIC BLOOD PRESSURE: 141 MMHG | WEIGHT: 160.69 LBS | HEIGHT: 65 IN | BODY MASS INDEX: 26.77 KG/M2 | DIASTOLIC BLOOD PRESSURE: 69 MMHG

## 2020-06-24 DIAGNOSIS — R11.0 NAUSEA: ICD-10-CM

## 2020-06-24 DIAGNOSIS — D50.9 IRON DEFICIENCY ANEMIA, UNSPECIFIED IRON DEFICIENCY ANEMIA TYPE: ICD-10-CM

## 2020-06-24 DIAGNOSIS — K59.00 CONSTIPATION, UNSPECIFIED CONSTIPATION TYPE: ICD-10-CM

## 2020-06-24 DIAGNOSIS — Z86.010 HISTORY OF COLON POLYPS: Primary | ICD-10-CM

## 2020-06-24 DIAGNOSIS — D50.0 ANEMIA DUE TO BLOOD LOSS, CHRONIC: Primary | ICD-10-CM

## 2020-06-24 PROCEDURE — 3077F SYST BP >= 140 MM HG: CPT | Mod: CPTII,S$GLB,, | Performed by: INTERNAL MEDICINE

## 2020-06-24 PROCEDURE — 99212 OFFICE O/P EST SF 10 MIN: CPT | Performed by: INTERNAL MEDICINE

## 2020-06-24 PROCEDURE — 1101F PR PT FALLS ASSESS DOC 0-1 FALLS W/OUT INJ PAST YR: ICD-10-PCS | Mod: CPTII,S$GLB,, | Performed by: INTERNAL MEDICINE

## 2020-06-24 PROCEDURE — 99214 PR OFFICE/OUTPT VISIT, EST, LEVL IV, 30-39 MIN: ICD-10-PCS | Mod: S$GLB,,, | Performed by: INTERNAL MEDICINE

## 2020-06-24 PROCEDURE — 1125F PR PAIN SEVERITY QUANTIFIED, PAIN PRESENT: ICD-10-PCS | Mod: S$GLB,,, | Performed by: INTERNAL MEDICINE

## 2020-06-24 PROCEDURE — 3078F PR MOST RECENT DIASTOLIC BLOOD PRESSURE < 80 MM HG: ICD-10-PCS | Mod: CPTII,S$GLB,, | Performed by: INTERNAL MEDICINE

## 2020-06-24 PROCEDURE — 1125F AMNT PAIN NOTED PAIN PRSNT: CPT | Mod: S$GLB,,, | Performed by: INTERNAL MEDICINE

## 2020-06-24 PROCEDURE — 99999 PR PBB SHADOW E&M-EST. PATIENT-LVL IV: ICD-10-PCS | Mod: PBBFAC,,, | Performed by: INTERNAL MEDICINE

## 2020-06-24 PROCEDURE — 1159F PR MEDICATION LIST DOCUMENTED IN MEDICAL RECORD: ICD-10-PCS | Mod: S$GLB,,, | Performed by: INTERNAL MEDICINE

## 2020-06-24 PROCEDURE — 1159F MED LIST DOCD IN RCRD: CPT | Mod: S$GLB,,, | Performed by: INTERNAL MEDICINE

## 2020-06-24 PROCEDURE — 3077F PR MOST RECENT SYSTOLIC BLOOD PRESSURE >= 140 MM HG: ICD-10-PCS | Mod: CPTII,S$GLB,, | Performed by: INTERNAL MEDICINE

## 2020-06-24 PROCEDURE — 1101F PT FALLS ASSESS-DOCD LE1/YR: CPT | Mod: CPTII,S$GLB,, | Performed by: INTERNAL MEDICINE

## 2020-06-24 PROCEDURE — 99999 PR PBB SHADOW E&M-EST. PATIENT-LVL IV: CPT | Mod: PBBFAC,,, | Performed by: INTERNAL MEDICINE

## 2020-06-24 PROCEDURE — 99214 OFFICE O/P EST MOD 30 MIN: CPT | Mod: S$GLB,,, | Performed by: INTERNAL MEDICINE

## 2020-06-24 PROCEDURE — 3078F DIAST BP <80 MM HG: CPT | Mod: CPTII,S$GLB,, | Performed by: INTERNAL MEDICINE

## 2020-06-24 NOTE — TELEPHONE ENCOUNTER
Spoke with Sree at [facility] pharmacy and clarified new script was sent by Tobi Pickering today for 290g

## 2020-06-24 NOTE — LETTER
June 24, 2020      Narayan Myers MD  [address]  [place] [address]           [facility] [place] [address]  Phone: [phone]  Fax: [phone]          Patient: Erica Masterson   MR Number: [id number]   YOB: 1945   Date of Visit: 6/24/2020       Dear Dr. Narayan Myers:    Thank you for referring Erica Masterson to me for evaluation. Attached you will find relevant portions of my assessment and plan of care.    If you have questions, please do not hesitate to call me. I look forward to following Erica Masterson along with you.    Sincerely,    Albin Unger MD    Enclosure  CC:  No Recipients    If you would like to receive this communication electronically, please contact [email or web address] or [phone] to request more information on [employer] access.    For providers and/or their staff who would like to refer a patient to [facility], please contact us through our one-stop-shop provider referral line, [facility], at [phone].    If you feel you have received this communication in error or would no longer like to receive these types of communications, please e-mail [email or web address]

## 2020-06-24 NOTE — TELEPHONE ENCOUNTER
Pt informed Dr. Unger running late due to weather.  He will call her in a few minutes.  Pt acknowledged understanding.

## 2020-06-24 NOTE — TELEPHONE ENCOUNTER
----- Message from Joanna Carlos sent at 6/24/2020  3:16 PM CDT -----  Contact: @[facility]  Type:  Pharmacy Calling to Clarify an RX    Name of Caller:  [place]  Pharmacy Name:    [id number] - [place] - [address]  [place] [address]  Phone: [phone] Fax: [phone]  Prescription Name:  linaCLOtide (LINZESS) 290 mcg Cap capsule  What do they need to clarify?:  dosage, they have two scripts with different dosages  Best Call Back Number:[phone]  Additional Information:  Please advise-thank you

## 2020-06-24 NOTE — PROGRESS NOTES
Subjective:       Patient ID: Erica Masterson is a 74 y.o. female.    This is an established patient.      Chief Complaint: Nausea, Abdominal Cramping, and Constipation    PAST HISTORY:    Abdominal Pain  This is a new problem. The current episode started 1 to 4 weeks ago. The onset quality is undetermined. The problem occurs daily. Duration: variable. The problem has been waxing and waning. The pain is located in the epigastric region. The pain is at a severity of 6/10. The pain is moderate. The quality of the pain is dull. The abdominal pain does not radiate. Associated symptoms include constipation and nausea. Pertinent negatives include no arthralgias, diarrhea, dysuria, fever, hematochezia, hematuria, melena, myalgias, vomiting or weight loss. Nothing aggravates the pain. The pain is relieved by bowel movements. Treatments tried: laxatives, stool softener.  Had linzess in the past. The treatment provided mild relief. Prior diagnostic workup includes lower endoscopy and upper endoscopy (Last EGD 2016.  Last colonoscopy 2018,). Her past medical history is significant for GERD and irritable bowel syndrome.   Nausea  Associated symptoms include abdominal pain and nausea. Pertinent negatives include no arthralgias, chest pain, chills, coughing, fatigue, fever, myalgias, rash, sore throat or vomiting.   Abdominal Cramping  Associated symptoms include constipation and nausea. Pertinent negatives include no arthralgias, diarrhea, dysuria, fever, hematochezia, hematuria, melena, myalgias, vomiting or weight loss. Her past medical history is significant for GERD and irritable bowel syndrome.   Constipation  Associated symptoms include abdominal pain and nausea. Pertinent negatives include no diarrhea, fever, hematochezia, melena, rectal pain, vomiting or weight loss. Her past medical history is significant for irritable bowel syndrome. (Has prior history of constipation.)   She has a new anemia on recent lab work with  "iron studies suggestive of iron deficiency.  She also complains of rectal pain and burning which are new.   She also complains of increased GERD symptoms including belching and heartburn.    INTERVAL HISTORY:  Since last visit, patient had EGD/colonoscopy and pillcam.  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  No further intervention recommended for this unless anemia worsens.  She has been started on Linzess 145 mcg for chronic constipation with some marginal improvement but still has nausea and symptoms of incomplete evacuation.  She denies bleeding.  She is no longer straining but she still has crampy abdominal discomfort.    Review of Systems   Constitutional: Negative for chills, fatigue, fever and weight loss.   HENT: Negative for sore throat and trouble swallowing.    Respiratory: Negative for cough, shortness of breath and wheezing.    Cardiovascular: Negative for chest pain and palpitations.   Gastrointestinal: Positive for abdominal pain, constipation and nausea. Negative for blood in stool, diarrhea, hematochezia, melena, rectal pain and vomiting.   Genitourinary: Negative for dysuria and hematuria.   Musculoskeletal: Negative for arthralgias and myalgias.   Skin: Negative for color change and rash.   All other systems reviewed and are negative.      Objective:       Vitals:    06/24/20 1450   BP: (!) 141/69   BP Location: Left arm   Patient Position: Sitting   Pulse: 68   Weight: 72.9 kg (160 lb 11.5 oz)   Height: 5' 5" (1.651 m)       Physical Exam  Constitutional:       Appearance: She is well-developed.   HENT:      Head: Normocephalic and atraumatic.   Eyes:      General: No scleral icterus.     Pupils: Pupils are equal, round, and reactive to light.   Neck:      Musculoskeletal: Normal range of motion.   Cardiovascular:      Rate and Rhythm: Normal rate and regular rhythm.      Heart sounds: No murmur.   Pulmonary:      Effort: Pulmonary effort is normal.      Breath sounds: " Normal breath sounds. No wheezing.   Abdominal:      General: Bowel sounds are normal. There is no distension.      Palpations: Abdomen is soft.      Tenderness: There is abdominal tenderness (mild, epigastric).   Lymphadenopathy:      Cervical: No cervical adenopathy.   Neurological:      Mental Status: She is alert and oriented to person, place, and time.         Lab Results   Component Value Date    WBC 6.39 05/12/2020    HGB 11.4 (L) 05/12/2020    HCT 36.3 (L) 05/12/2020    MCV 94 05/12/2020     05/12/2020         CMP  Sodium   Date Value Ref Range Status   06/20/2020 139 136 - 145 mmol/L Final     Potassium   Date Value Ref Range Status   06/20/2020 3.7 3.5 - 5.1 mmol/L Final     Chloride   Date Value Ref Range Status   06/20/2020 101 95 - 110 mmol/L Final     CO2   Date Value Ref Range Status   06/20/2020 27 23 - 29 mmol/L Final     Glucose   Date Value Ref Range Status   06/20/2020 115 (H) 70 - 110 mg/dL Final     BUN, Bld   Date Value Ref Range Status   06/20/2020 18 8 - 23 mg/dL Final     Creatinine   Date Value Ref Range Status   06/20/2020 1.1 0.5 - 1.4 mg/dL Final   06/04/2013 0.9 0.5 - 1.4 mg/dL Final     Calcium   Date Value Ref Range Status   06/20/2020 9.2 8.7 - 10.5 mg/dL Final   06/04/2013 9.7 8.7 - 10.5 mg/dL Final     Total Protein   Date Value Ref Range Status   06/20/2020 7.5 6.0 - 8.4 g/dL Final     Albumin   Date Value Ref Range Status   06/20/2020 4.2 3.5 - 5.2 g/dL Final     Total Bilirubin   Date Value Ref Range Status   06/20/2020 0.7 0.1 - 1.0 mg/dL Final     Comment:     For infants and newborns, interpretation of results should be based  on gestational age, weight and in agreement with clinical  observations.  Premature Infant recommended reference ranges:  Up to 24 hours.............<8.0 mg/dL  Up to 48 hours............<12.0 mg/dL  3-5 days..................<15.0 mg/dL  6-29 days.................<15.0 mg/dL       Alkaline Phosphatase   Date Value Ref Range Status   06/20/2020  47 (L) 55 - 135 U/L Final     AST   Date Value Ref Range Status   06/20/2020 19 10 - 40 U/L Final     ALT   Date Value Ref Range Status   06/20/2020 22 10 - 44 U/L Final     Anion Gap   Date Value Ref Range Status   06/20/2020 11 8 - 16 mmol/L Final   06/04/2013 9 5 - 15 meq/L Final     eGFR if    Date Value Ref Range Status   06/20/2020 56.4 (A) >60 mL/min/1.73 m^2 Final     eGFR if non    Date Value Ref Range Status   06/20/2020 48.9 (A) >60 mL/min/1.73 m^2 Final     Comment:     Calculation used to obtain the estimated glomerular filtration  rate (eGFR) is the CKD-EPI equation.          Assessment:       1. History of colon polyps    2. Constipation, unspecified constipation type    3. Iron deficiency anemia, unspecified iron deficiency anemia type    4. Nausea        Plan:       1.  Recommend daily exercise, adequate water intake, and high fiber diet.  Recommend daily miralax (17g PO once or twice daily) with intermittently dosed dulcolax (every 2-3 days)  to facilitate bowel movements.  If no relief with this, consider adding emollient laxative (castor oil or mineral oil) +/- enema.  2.  Increase Linzess to 290 mcg as I believe her symptoms are largely being driven by chronic constipation.  3.  Antireflux precautions including avoidance of late night eating and lying down soon after eating.     4.  Follow up in 4-6 weeks.

## 2020-06-24 NOTE — TELEPHONE ENCOUNTER
----- Message from Carlyn Castillo sent at 6/24/2020  9:31 AM CDT -----  Contact: (H) [phone] or (C) [phone]  GI---Pt is requesting a callback in regards to  having an Audio Visit with the Doctor and the pt is currently awaiting the Doctor.    Please call and advise

## 2020-06-24 NOTE — TELEPHONE ENCOUNTER
.Patient notified of breast biopsy results.     Final Pathologic Diagnosis RIGHT BREAST, NEEDLE CORE BIOPSIES:   -  Hyalinized fibroadenomatoid nodule with focal dystrophic stromal   calcification and rare small epithelial microcalcifications; consistent with   a hyalinized fibroadenoma.   -  Fibrocystic changes with focal columnar cell change, sclerosing adenosis   and usual ductal hyperplasia.   -  No evidence of atypia or malignancy.          Instructed to repeat diagnostic mammogram in one month.

## 2020-06-24 NOTE — PATIENT INSTRUCTIONS
Take multivitamins with iron daily.    Monitor cbc every 3-6 months.    Follow up with Dr. Britton for recurrent nausea.

## 2020-06-24 NOTE — PROCEDURES
[facility]   [facility]  [address] [place] [address]  [phone]             Full Name: Erica Masterson Gender: Female  Patient ID: [id number] YOB: 1945      Visit Date: 6/23/2020 15:29  Age: 74 Years 8 Months Old  Examining Physician: Alka Walters MD   Referring Physician: Nilay Lovett M.D.   Technologist: Carmenza KRISHNAN   Height: 5 feet 5 inch  History: RLE,RUE,Paresthesia, Symptoms began 2 years ago. Hx of DM ,       Sensory NCS      Nerve / Sites Rec. Site Onset Lat Peak Lat NP Amp Segments Distance Peak Diff Velocity Temp.     ms ms µV  cm ms m/s °C   R Median - Digit II (Antidromic)      Wrist Dig II 2.45 3.07 29.2 Wrist - Dig II 13  53 37      Ref.   ?3.80 ?10.0 Ref.   ?50    R Ulnar - Digit V (Antidromic)      Wrist Dig V 2.08 2.81 31.9 Wrist - Dig V 11  53 37      Ref.   ?3.20 ?10.0 Ref.   ?50    R Sural - Ankle (Calf)      Calf Ankle NR NR NR Calf - Ankle 14  NR 37      Ref.   ?4.60 ?3.0 Ref.   ?40    R Superficial peroneal - Ankle      Lat leg Ankle 3.44 3.75 1.4 Lat leg - Ankle 12  35 37      Ref.   ?4.60 ?3.0 Ref.       R Median - Mixed Palmar      Med - Palm Wrist 1.88 2.45 25.5 Med - Palm - Wrist 14  75 37      Uln - Palm Wrist 1.67 2.19 18.0 Uln - Palm - Wrist 14  84 37        Med - Palm - Uln - Palm  0.26  37        Ref.  ?0.60         Motor NCS      Nerve / Sites Muscle Latency Ref. Amplitude Ref. Amp % Duration Segments Distance Lat Diff Velocity Ref. Temp.     ms ms mV mV % ms  cm ms m/s m/s °C   R Median - APB      Wrist APB 3.02 ?4.00 10.2 ?5.0 100 4.22 Wrist - APB     37      Elbow APB 6.98  9.7  94.5 4.95 Elbow - Wrist 25 3.96 63 ?50 37   R Ulnar - ADM      Wrist ADM 1.98 ?3.10 13.8 ?7.0 100 5.83 Wrist - ADM     37      B.Elbow ADM 5.68  12.5  90.4 5.83 B.Elbow - Wrist 23.5 3.70 64 ?50 37      A.Elbow ADM 7.76  12.0  86.8 5.99 A.Elbow - B.Elbow 10 2.08 48  37   R Peroneal - EDB      Ankle EDB 2.81 ?6.00 3.3 ?2.5 100 4.22  Ankle - EDB     37      Fib head EDB 10.26  2.8  84 4.95 Fib head - Ankle 36.5 7.45 49 ?40 37      Pop fossa EDB 11.93  2.5  77.4 5.16 Pop fossa - Fib head 7.5 1.67 45  37   R Tibial - AH      Ankle AH 3.75 ?6.00 4.1 ?2.5 100 4.53 Ankle - AH     37      Pop fossa AH 12.86  3.4  82 5.94 Pop fossa - Ankle 43 9.11 47 ?40 37       F  Wave      Nerve Fmin Ref.    ms ms   R Median - APB 25.89 ?31.00   R Ulnar - ADM 27.81 ?32.00   R Peroneal - EDB 48.80 ?56.00   R Tibial - AH 47.71 ?56.00       EMG Summary Table     Spontaneous Recruitment Activation Duration Amplitude Polyphasia Comment   Muscle Ins Act Fib Fasc Pattern - - - - -   R. Tibialis anterior Normal 0 0 Normal Normal Normal Normal Normal Normal   R. Gastrocnemius (Medial head) Normal 0 0 Normal Normal Normal Normal Normal Normal   R. Gastrocnemius (Lateral head) Normal 0 0 Normal Poor Normal Normal Normal Normal   R. Abductor hallucis Normal 0 0 Normal Poor Normal Normal Normal Normal   R. Tibialis posterior Normal 0 0 Normal Normal Normal Normal Normal Normal   R. Vastus medialis Normal 0 0 Normal Normal Normal Normal Normal Normal   R. First dorsal interosseous Normal 0 0 Normal Normal Normal Normal Normal Normal   R. Opponens pollicis Normal 0 0 Normal Normal Normal Normal Normal Normal   R. Biceps brachii Normal 0 0 Normal Normal Normal Normal Normal Normal   R. Triceps brachii Normal 0 0 Normal Normal Normal Normal Normal Normal   R. Deltoid Normal 0 0 Normal Normal Normal Normal Normal Normal         Summary: Right lower extremity nerve conduction studies show absent sural sensory response and low amplitude superficial peroneal sensory response. Right upper extremity nerve conduction studies were normal. Right upper and lower extremity needle examination was normal.     Impression: The absent sural response can be normal for age and the low amplitude superficial peroneal sensory response could also be age-related but in the appropriate clinical context may  be evidence of a mild sensory peripheral neuropathy. No evidence of lumbosacral,cervical radiculopathies, nor mononeuropathies were present on the EMG.     Thank you for referring to the [facility] in [place].  Please feel free to contact the clinic if you have any further questions regarding this study or report.      Alka Walters MD

## 2020-06-25 ENCOUNTER — LAB VISIT (OUTPATIENT)
Dept: LAB | Facility: HOSPITAL | Age: 75
End: 2020-06-25
Attending: FAMILY MEDICINE
Payer: MEDICARE

## 2020-06-25 DIAGNOSIS — E11.9 NEW ONSET TYPE 2 DIABETES MELLITUS: ICD-10-CM

## 2020-06-25 DIAGNOSIS — R20.2 PARESTHESIA: ICD-10-CM

## 2020-06-25 LAB
ALBUMIN SERPL BCP-MCNC: 3.6 G/DL (ref 3.5–5.2)
ALP SERPL-CCNC: 52 U/L (ref 55–135)
ALT SERPL W/O P-5'-P-CCNC: 20 U/L (ref 10–44)
ANION GAP SERPL CALC-SCNC: 5 MMOL/L (ref 8–16)
ANION GAP SERPL CALC-SCNC: 5 MMOL/L (ref 8–16)
AST SERPL-CCNC: 18 U/L (ref 10–40)
BASOPHILS # BLD AUTO: 0.05 K/UL (ref 0–0.2)
BASOPHILS NFR BLD: 1 % (ref 0–1.9)
BILIRUB SERPL-MCNC: 0.4 MG/DL (ref 0.1–1)
BUN SERPL-MCNC: 17 MG/DL (ref 8–23)
BUN SERPL-MCNC: 17 MG/DL (ref 8–23)
CALCIUM SERPL-MCNC: 9.1 MG/DL (ref 8.7–10.5)
CALCIUM SERPL-MCNC: 9.1 MG/DL (ref 8.7–10.5)
CHLORIDE SERPL-SCNC: 105 MMOL/L (ref 95–110)
CHLORIDE SERPL-SCNC: 105 MMOL/L (ref 95–110)
CHOLEST SERPL-MCNC: 267 MG/DL (ref 120–199)
CHOLEST/HDLC SERPL: 4.8 {RATIO} (ref 2–5)
CO2 SERPL-SCNC: 30 MMOL/L (ref 23–29)
CO2 SERPL-SCNC: 30 MMOL/L (ref 23–29)
CREAT SERPL-MCNC: 1.3 MG/DL (ref 0.5–1.4)
CREAT SERPL-MCNC: 1.3 MG/DL (ref 0.5–1.4)
DIFFERENTIAL METHOD: ABNORMAL
EOSINOPHIL # BLD AUTO: 0.2 K/UL (ref 0–0.5)
EOSINOPHIL NFR BLD: 4.2 % (ref 0–8)
ERYTHROCYTE [DISTWIDTH] IN BLOOD BY AUTOMATED COUNT: 13 % (ref 11.5–14.5)
EST. GFR  (AFRICAN AMERICAN): 46 ML/MIN/1.73 M^2
EST. GFR  (AFRICAN AMERICAN): 46 ML/MIN/1.73 M^2
EST. GFR  (NON AFRICAN AMERICAN): 39.9 ML/MIN/1.73 M^2
EST. GFR  (NON AFRICAN AMERICAN): 39.9 ML/MIN/1.73 M^2
ESTIMATED AVG GLUCOSE: 126 MG/DL (ref 68–131)
FOLATE SERPL-MCNC: 11.8 NG/ML (ref 4–24)
GLUCOSE SERPL-MCNC: 102 MG/DL (ref 70–110)
GLUCOSE SERPL-MCNC: 102 MG/DL (ref 70–110)
HBA1C MFR BLD HPLC: 6 % (ref 4–5.6)
HCT VFR BLD AUTO: 36.9 % (ref 37–48.5)
HCYS SERPL-SCNC: 9.5 UMOL/L (ref 4–15.5)
HDLC SERPL-MCNC: 56 MG/DL (ref 40–75)
HDLC SERPL: 21 % (ref 20–50)
HGB BLD-MCNC: 11.2 G/DL (ref 12–16)
IMM GRANULOCYTES # BLD AUTO: 0.01 K/UL (ref 0–0.04)
IMM GRANULOCYTES NFR BLD AUTO: 0.2 % (ref 0–0.5)
LDLC SERPL CALC-MCNC: 196.2 MG/DL (ref 63–159)
LYMPHOCYTES # BLD AUTO: 1.6 K/UL (ref 1–4.8)
LYMPHOCYTES NFR BLD: 32.1 % (ref 18–48)
MCH RBC QN AUTO: 29.1 PG (ref 27–31)
MCHC RBC AUTO-ENTMCNC: 30.4 G/DL (ref 32–36)
MCV RBC AUTO: 96 FL (ref 82–98)
MONOCYTES # BLD AUTO: 0.3 K/UL (ref 0.3–1)
MONOCYTES NFR BLD: 6.2 % (ref 4–15)
NEUTROPHILS # BLD AUTO: 2.8 K/UL (ref 1.8–7.7)
NEUTROPHILS NFR BLD: 56.3 % (ref 38–73)
NONHDLC SERPL-MCNC: 211 MG/DL
NRBC BLD-RTO: 0 /100 WBC
PLATELET # BLD AUTO: 298 K/UL (ref 150–350)
PMV BLD AUTO: 9.7 FL (ref 9.2–12.9)
POTASSIUM SERPL-SCNC: 4.4 MMOL/L (ref 3.5–5.1)
POTASSIUM SERPL-SCNC: 4.4 MMOL/L (ref 3.5–5.1)
PROT SERPL-MCNC: 7.1 G/DL (ref 6–8.4)
RBC # BLD AUTO: 3.85 M/UL (ref 4–5.4)
SODIUM SERPL-SCNC: 140 MMOL/L (ref 136–145)
SODIUM SERPL-SCNC: 140 MMOL/L (ref 136–145)
TRIGL SERPL-MCNC: 74 MG/DL (ref 30–150)
TSH SERPL DL<=0.005 MIU/L-ACNC: 1.25 UIU/ML (ref 0.4–4)
VIT B12 SERPL-MCNC: 423 PG/ML (ref 210–950)
WBC # BLD AUTO: 4.98 K/UL (ref 3.9–12.7)

## 2020-06-25 PROCEDURE — 82607 VITAMIN B-12: CPT

## 2020-06-25 PROCEDURE — 84443 ASSAY THYROID STIM HORMONE: CPT

## 2020-06-25 PROCEDURE — 82746 ASSAY OF FOLIC ACID SERUM: CPT

## 2020-06-25 PROCEDURE — 80053 COMPREHEN METABOLIC PANEL: CPT

## 2020-06-25 PROCEDURE — 84207 ASSAY OF VITAMIN B-6: CPT

## 2020-06-25 PROCEDURE — 84165 PATHOLOGIST INTERPRETATION SPE: ICD-10-PCS | Mod: 26,,, | Performed by: PATHOLOGY

## 2020-06-25 PROCEDURE — 80061 LIPID PANEL: CPT

## 2020-06-25 PROCEDURE — 84425 ASSAY OF VITAMIN B-1: CPT

## 2020-06-25 PROCEDURE — 83036 HEMOGLOBIN GLYCOSYLATED A1C: CPT

## 2020-06-25 PROCEDURE — 36415 COLL VENOUS BLD VENIPUNCTURE: CPT | Mod: PO

## 2020-06-25 PROCEDURE — 84165 PROTEIN E-PHORESIS SERUM: CPT | Mod: 26,,, | Performed by: PATHOLOGY

## 2020-06-25 PROCEDURE — 84165 PROTEIN E-PHORESIS SERUM: CPT

## 2020-06-25 PROCEDURE — 85025 COMPLETE CBC W/AUTO DIFF WBC: CPT

## 2020-06-25 PROCEDURE — 83090 ASSAY OF HOMOCYSTEINE: CPT

## 2020-06-26 ENCOUNTER — TELEPHONE (OUTPATIENT)
Dept: FAMILY MEDICINE | Facility: CLINIC | Age: 75
End: 2020-06-26

## 2020-06-26 ENCOUNTER — OFFICE VISIT (OUTPATIENT)
Dept: FAMILY MEDICINE | Facility: CLINIC | Age: 75
End: 2020-06-26
Payer: MEDICARE

## 2020-06-26 VITALS
OXYGEN SATURATION: 95 % | SYSTOLIC BLOOD PRESSURE: 138 MMHG | DIASTOLIC BLOOD PRESSURE: 68 MMHG | BODY MASS INDEX: 26.52 KG/M2 | HEIGHT: 65 IN | TEMPERATURE: 98 F | HEART RATE: 80 BPM | WEIGHT: 159.19 LBS | RESPIRATION RATE: 18 BRPM

## 2020-06-26 DIAGNOSIS — E11.59 HYPERTENSION ASSOCIATED WITH DIABETES: Primary | ICD-10-CM

## 2020-06-26 DIAGNOSIS — I15.2 HYPERTENSION ASSOCIATED WITH DIABETES: Primary | ICD-10-CM

## 2020-06-26 DIAGNOSIS — E11.69 HYPERLIPIDEMIA ASSOCIATED WITH TYPE 2 DIABETES MELLITUS: ICD-10-CM

## 2020-06-26 DIAGNOSIS — E11.9 TYPE 2 DIABETES MELLITUS WITHOUT COMPLICATION, WITHOUT LONG-TERM CURRENT USE OF INSULIN: ICD-10-CM

## 2020-06-26 DIAGNOSIS — R11.0 NAUSEA: ICD-10-CM

## 2020-06-26 DIAGNOSIS — M81.0 OSTEOPOROSIS, UNSPECIFIED OSTEOPOROSIS TYPE, UNSPECIFIED PATHOLOGICAL FRACTURE PRESENCE: ICD-10-CM

## 2020-06-26 DIAGNOSIS — E11.9 TYPE 2 DIABETES MELLITUS WITHOUT COMPLICATION, WITHOUT LONG-TERM CURRENT USE OF INSULIN: Primary | ICD-10-CM

## 2020-06-26 DIAGNOSIS — E78.5 HYPERLIPIDEMIA, UNSPECIFIED HYPERLIPIDEMIA TYPE: ICD-10-CM

## 2020-06-26 DIAGNOSIS — R68.2 DRY MOUTH: ICD-10-CM

## 2020-06-26 DIAGNOSIS — K59.00 CONSTIPATION, UNSPECIFIED CONSTIPATION TYPE: ICD-10-CM

## 2020-06-26 DIAGNOSIS — R11.0 NAUSEA: Primary | ICD-10-CM

## 2020-06-26 DIAGNOSIS — E78.5 HYPERLIPIDEMIA ASSOCIATED WITH TYPE 2 DIABETES MELLITUS: ICD-10-CM

## 2020-06-26 LAB
ALBUMIN SERPL ELPH-MCNC: 3.78 G/DL (ref 3.35–5.55)
ALPHA1 GLOB SERPL ELPH-MCNC: 0.26 G/DL (ref 0.17–0.41)
ALPHA2 GLOB SERPL ELPH-MCNC: 0.83 G/DL (ref 0.43–0.99)
B-GLOBULIN SERPL ELPH-MCNC: 0.69 G/DL (ref 0.5–1.1)
GAMMA GLOB SERPL ELPH-MCNC: 1.03 G/DL (ref 0.67–1.58)
PATHOLOGIST INTERPRETATION SPE: NORMAL
PROT SERPL-MCNC: 6.6 G/DL (ref 6–8.4)

## 2020-06-26 PROCEDURE — 3075F SYST BP GE 130 - 139MM HG: CPT | Mod: CPTII,S$GLB,, | Performed by: NURSE PRACTITIONER

## 2020-06-26 PROCEDURE — 99214 OFFICE O/P EST MOD 30 MIN: CPT | Mod: S$GLB,,, | Performed by: NURSE PRACTITIONER

## 2020-06-26 PROCEDURE — 1101F PT FALLS ASSESS-DOCD LE1/YR: CPT | Mod: CPTII,S$GLB,, | Performed by: NURSE PRACTITIONER

## 2020-06-26 PROCEDURE — 3078F DIAST BP <80 MM HG: CPT | Mod: CPTII,S$GLB,, | Performed by: NURSE PRACTITIONER

## 2020-06-26 PROCEDURE — 3075F PR MOST RECENT SYSTOLIC BLOOD PRESS GE 130-139MM HG: ICD-10-PCS | Mod: CPTII,S$GLB,, | Performed by: NURSE PRACTITIONER

## 2020-06-26 PROCEDURE — 1159F MED LIST DOCD IN RCRD: CPT | Mod: S$GLB,,, | Performed by: NURSE PRACTITIONER

## 2020-06-26 PROCEDURE — 99999 PR PBB SHADOW E&M-EST. PATIENT-LVL V: ICD-10-PCS | Mod: PBBFAC,,, | Performed by: NURSE PRACTITIONER

## 2020-06-26 PROCEDURE — 1125F AMNT PAIN NOTED PAIN PRSNT: CPT | Mod: S$GLB,,, | Performed by: NURSE PRACTITIONER

## 2020-06-26 PROCEDURE — 99214 PR OFFICE/OUTPT VISIT, EST, LEVL IV, 30-39 MIN: ICD-10-PCS | Mod: S$GLB,,, | Performed by: NURSE PRACTITIONER

## 2020-06-26 PROCEDURE — 1125F PR PAIN SEVERITY QUANTIFIED, PAIN PRESENT: ICD-10-PCS | Mod: S$GLB,,, | Performed by: NURSE PRACTITIONER

## 2020-06-26 PROCEDURE — 1159F PR MEDICATION LIST DOCUMENTED IN MEDICAL RECORD: ICD-10-PCS | Mod: S$GLB,,, | Performed by: NURSE PRACTITIONER

## 2020-06-26 PROCEDURE — 3078F PR MOST RECENT DIASTOLIC BLOOD PRESSURE < 80 MM HG: ICD-10-PCS | Mod: CPTII,S$GLB,, | Performed by: NURSE PRACTITIONER

## 2020-06-26 PROCEDURE — 99999 PR PBB SHADOW E&M-EST. PATIENT-LVL V: CPT | Mod: PBBFAC,,, | Performed by: NURSE PRACTITIONER

## 2020-06-26 PROCEDURE — 1101F PR PT FALLS ASSESS DOC 0-1 FALLS W/OUT INJ PAST YR: ICD-10-PCS | Mod: CPTII,S$GLB,, | Performed by: NURSE PRACTITIONER

## 2020-06-26 RX ORDER — ONDANSETRON 4 MG/1
4 TABLET, ORALLY DISINTEGRATING ORAL EVERY 6 HOURS PRN
Qty: 20 TABLET | Refills: 1 | Status: SHIPPED | OUTPATIENT
Start: 2020-06-26 | End: 2020-06-28 | Stop reason: SDUPTHER

## 2020-06-26 RX ORDER — ROSUVASTATIN CALCIUM 20 MG/1
20 TABLET, COATED ORAL NIGHTLY
Qty: 90 TABLET | Refills: 3 | Status: SHIPPED | OUTPATIENT
Start: 2020-06-26 | End: 2021-08-17

## 2020-06-26 NOTE — TELEPHONE ENCOUNTER
----- Message from Narayan Myers MD sent at 6/25/2020  9:32 PM CDT -----  Her kidney function has had a slight downturn again but the chemistry panel is otherwise normal and blood sugar control is excellent.  Her LDL/bad cholesterol is critical again.  She needs to get back on the crestor.

## 2020-06-26 NOTE — TELEPHONE ENCOUNTER
Patient stated she has nausea, Severe upper abdominal pain, nausea, constipation.   She has taken Milk of Mag for constipation. Bowels are moving some.   Stated she has gone to er every week for several weeks for this. She has an apt with Dr Myers on 6-30-20.   Please advise.

## 2020-06-26 NOTE — PROGRESS NOTES
"Subjective:       Patient ID: Erica Masterson is a 74 y.o. female presents to the clinic for constipation and nausea for three months. This patient is new to me. She reports using her finger to remove rock-like stool piece "to get bowels moving". She was diagnosed with diabetes mellitus type 2 three months and started on Metformin and later switched to Januvia. The patient states that she has been to the emergency department seven times for symptoms. She states "It feels like I'm going to throw up my bowels".    Chief Complaint: Nausea and Constipation    Nausea  This is a recurrent problem. The current episode started more than 1 month ago. The problem occurs constantly. The problem has been unchanged. Associated symptoms include abdominal pain, a change in bowel habit and nausea. Pertinent negatives include no anorexia, arthralgias, chest pain, chills, congestion, coughing, diaphoresis, fatigue, fever, headaches, joint swelling, myalgias, neck pain, numbness, rash, sore throat, swollen glands, urinary symptoms, vertigo, visual change, vomiting or weakness. Nothing aggravates the symptoms. The treatment provided mild relief.   Constipation  This is a recurrent problem. The current episode started more than 1 month ago. The problem is unchanged. Her stool frequency is 1 time per day. The stool is described as firm. The patient is on a high fiber diet. She does not exercise regularly. There has been adequate water intake. Associated symptoms include abdominal pain and nausea. Pertinent negatives include no anorexia, back pain, bloating, diarrhea, difficulty urinating, fecal incontinence, fever, flatus, hematochezia, hemorrhoids, melena, rectal pain, vomiting or weight loss. Risk factors include obesity. Treatments tried:  Milk of Magnesium and Linzess. The treatment provided mild relief. There is no history of abdominal surgery, endocrine disease, inflammatory bowel disease, irritable bowel syndrome, metabolic " "disease, neurologic disease, neuromuscular disease, psychiatric history or radiation treatment.       Vitals:    06/26/20 1257   BP: 138/68   Pulse: 80   Resp: 18   Temp: 98.3 °F (36.8 °C)   TempSrc: Oral   SpO2: 95%   Weight: 72.2 kg (159 lb 2.8 oz)   Height: 5' 5" (1.651 m)   PainSc:   6   PainLoc: Abdomen     Body mass index is 26.49 kg/m².    Past Medical History:   Diagnosis Date    Allergy     Anxiety     Cataract     Colon polyp     Degenerative arthritis of knee 4/3/2012    Diverticulosis     GERD (gastroesophageal reflux disease)     Hyperlipidemia     Hypertension     Multinodular goiter 3/26/2012    Myopathy, unspecified 1/18/2010    New onset type 2 diabetes mellitus 3/24/2020    New onset type 2 diabetes mellitus 3/24/2020    Tuberculosis      Past Surgical History:   Procedure Laterality Date    BREAST BIOPSY Left 2015    benign    COLONOSCOPY  12/2/15    Dr. Britton, multiple polyps, recheck five years-three years if more than 2 polyps are adenomatous    COLONOSCOPY N/A 12/2/2015    COLONOSCOPY N/A 3/20/2019    Procedure: COLONOSCOPY;  Surgeon: Jose Britton MD;  Location: [facility] ENDO;  Service: Endoscopy;  Laterality: N/A;    COLONOSCOPY N/A 5/20/2020    Dr. Britton; internal hemorrhoids; diverticulosis; polyps removed; repeat in 3 years    ESOPHAGOGASTRODUODENOSCOPY N/A 5/20/2020    Dr. Britton; small hiatal hernia; gastritis; 8 gastric polyps removed    FOOT SURGERY      HYSTERECTOMY      INTRALUMINAL GASTROINTESTINAL TRACT IMAGING VIA CAPSULE N/A 6/4/2020    Procedure: IMAGING PROCEDURE, GI TRACT, INTRALUMINAL, VIA CAPSULE;  Surgeon: Jose Britton MD;  Location: [facility] ENDO;  Service: Endoscopy;  Laterality: N/A;    KNEE SURGERY  06/06/2013    right knee tear Dr Iverson     LUNG LOBECTOMY  1966    right middle lobectomy, due to Tb    OOPHORECTOMY      SHOULDER SURGERY      francis      Social History     Socioeconomic History    Marital status:      Spouse name: " Not on file    Number of children: Not on file    Years of education: Not on file    Highest education level: Not on file   Occupational History    Not on file   Social Needs    Financial resource strain: Not on file    Food insecurity     Worry: Not on file     Inability: Not on file    Transportation needs     Medical: Not on file     Non-medical: Not on file   Tobacco Use    Smoking status: Never Smoker    Smokeless tobacco: Never Used   Substance and Sexual Activity    Alcohol use: Not Currently     Comment: stopped 2019    Drug use: No    Sexual activity: Not Currently   Lifestyle    Physical activity     Days per week: Not on file     Minutes per session: Not on file    Stress: Not on file   Relationships    Social connections     Talks on phone: Not on file     Gets together: Not on file     Attends Gnosticism service: Not on file     Active member of club or organization: Not on file     Attends meetings of clubs or organizations: Not on file     Relationship status: Not on file   Other Topics Concern    Are you pregnant or think you may be? Not Asked    Breast-feeding Not Asked   Social History Narrative    Not on file       Review of patient's allergies indicates:   Allergen Reactions    No known drug allergies        Current Outpatient Medications:     aspirin (ECOTRIN) 81 MG EC tablet, Take 162 mg by mouth once daily. , Disp: , Rfl:     blood sugar diagnostic (BLOOD GLUCOSE TEST) Strp, True Metrix glucose strips check once daily, Disp: 100 each, Rfl: 3    blood-glucose meter kit, True Metrix glucometer check glucose once daily, Disp: 1 each, Rfl: 0    carboxymethylcellulose sodium 0.25 % Drop, Place 1 drop into both eyes every evening. , Disp: , Rfl:     famotidine (PEPCID) 40 MG tablet, Take 1 tablet (40 mg total) by mouth nightly., Disp: 30 tablet, Rfl: 2    gabapentin (NEURONTIN) 300 MG capsule, Take 1 capsule (300 mg total) by mouth 3 (three) times daily., Disp: 90 capsule,  Rfl: 11    hyoscyamine (ANASPAZ,LEVSIN) 0.125 mg Tab, Take 1 tablet (125 mcg total) by mouth every 4 (four) hours as needed., Disp: 12 tablet, Rfl: 1    Lactobacillus rhamnosus GG (CULTURELLE) 15 billion cell capsule, Take 1 capsule by mouth once daily., Disp: , Rfl:     lancets Misc, True Metrix lancets check once daily, Disp: 100 each, Rfl: 3    linaCLOtide (LINZESS) 290 mcg Cap capsule, Take 1 capsule (290 mcg total) by mouth once daily., Disp: 90 capsule, Rfl: 3    losartan (COZAAR) 100 MG tablet, Take 1 tablet (100 mg total) by mouth once daily., Disp: 90 tablet, Rfl: 3    magnesium 250 mg Tab, Take 1 tablet by mouth once daily., Disp: , Rfl:     peg 400-hypromellose-glycerin (DRY EYE RELIEF) 1-0.2-0.2 % Drop, Apply 1 drop to eye 2 (two) times daily as needed., Disp: , Rfl:     rosuvastatin (CRESTOR) 20 MG tablet, Take 1 tablet (20 mg total) by mouth every evening., Disp: 90 tablet, Rfl: 3    saliva substitute combo no.9 (BIOTENE DRY MOUTH ORAL RINSE MM), 1 Dose by Mucous Membrane route once daily., Disp: , Rfl:     SITagliptin (JANUVIA) 25 MG Tab, Take 1 tablet (25 mg total) by mouth once daily., Disp: 30 tablet, Rfl: 5    sulfamethoxazole-trimethoprim 800-160mg (BACTRIM DS) 800-160 mg Tab, Take 1 tablet by mouth 2 (two) times daily., Disp: 14 tablet, Rfl: 0    ondansetron (ZOFRAN-ODT) 4 MG TbDL, Take 1 tablet (4 mg total) by mouth every 6 (six) hours as needed., Disp: 20 tablet, Rfl: 1    Review of Systems   Constitutional: Negative for activity change, appetite change, chills, diaphoresis, fatigue, fever and weight loss.   HENT: Negative for congestion, ear discharge, ear pain, facial swelling, hearing loss, postnasal drip, rhinorrhea, sinus pressure, sore throat, trouble swallowing and voice change.    Eyes: Negative for pain, discharge and itching.   Respiratory: Negative for cough, chest tightness, shortness of breath and wheezing.    Cardiovascular: Negative for chest pain, palpitations and  leg swelling.   Gastrointestinal: Positive for abdominal pain, change in bowel habit, constipation and nausea. Negative for abdominal distention, anal bleeding, anorexia, bloating, blood in stool, diarrhea, flatus, hematochezia, hemorrhoids, melena, rectal pain and vomiting.   Endocrine: Positive for polyuria. Negative for polydipsia and polyphagia.   Genitourinary: Negative for difficulty urinating, flank pain and urgency.   Musculoskeletal: Negative for arthralgias, back pain, gait problem, joint swelling, myalgias and neck pain.   Skin: Negative for color change, pallor, rash and wound.   Neurological: Negative for dizziness, vertigo, syncope, facial asymmetry, weakness, numbness and headaches.   Hematological: Negative for adenopathy.   Psychiatric/Behavioral: Negative for agitation, behavioral problems and confusion.       Objective:      Physical Exam  Vitals signs and nursing note reviewed.   Constitutional:       General: She is not in acute distress.     Appearance: Normal appearance. She is well-developed. She is obese. She is not ill-appearing, toxic-appearing or diaphoretic.   HENT:      Head: Normocephalic and atraumatic.      Right Ear: Tympanic membrane, ear canal and external ear normal. There is no impacted cerumen.      Left Ear: Tympanic membrane, ear canal and external ear normal.      Nose: Nose normal. No congestion or rhinorrhea.      Mouth/Throat:      Mouth: Mucous membranes are moist.      Pharynx: Oropharynx is clear. No oropharyngeal exudate or posterior oropharyngeal erythema.   Eyes:      General: No scleral icterus.        Right eye: No discharge.         Left eye: No discharge.      Extraocular Movements: Extraocular movements intact.      Conjunctiva/sclera: Conjunctivae normal.      Pupils: Pupils are equal, round, and reactive to light.   Neck:      Musculoskeletal: Normal range of motion and neck supple. No neck rigidity or muscular tenderness.      Thyroid: No thyromegaly.       Vascular: No carotid bruit or JVD.      Trachea: No tracheal deviation.   Cardiovascular:      Rate and Rhythm: Normal rate and regular rhythm.      Pulses: Normal pulses.      Heart sounds: Normal heart sounds. No murmur. No friction rub. No gallop.    Pulmonary:      Effort: Pulmonary effort is normal. No respiratory distress.      Breath sounds: Normal breath sounds. No stridor. No wheezing, rhonchi or rales.   Chest:      Chest wall: No tenderness.   Abdominal:      General: Abdomen is flat. Bowel sounds are normal. There is no distension.      Palpations: Abdomen is soft. There is no shifting dullness, fluid wave, hepatomegaly, splenomegaly, mass or pulsatile mass.      Tenderness: There is abdominal tenderness in the right upper quadrant and left upper quadrant. There is no right CVA tenderness, left CVA tenderness, guarding or rebound. Negative signs include Rashid's sign, Rovsing's sign, McBurney's sign, psoas sign and obturator sign.      Hernia: No hernia is present.   Musculoskeletal: Normal range of motion.         General: No swelling, tenderness, deformity or signs of injury.      Right lower leg: No edema.      Left lower leg: No edema.   Lymphadenopathy:      Cervical: No cervical adenopathy.   Skin:     General: Skin is warm and dry.      Capillary Refill: Capillary refill takes less than 2 seconds.      Coloration: Skin is not jaundiced or pale.      Findings: No bruising, erythema, lesion or rash.   Neurological:      General: No focal deficit present.      Mental Status: She is alert and oriented to person, place, and time.      Cranial Nerves: No cranial nerve deficit.      Sensory: No sensory deficit.      Motor: No weakness or abnormal muscle tone.      Coordination: Coordination normal.      Gait: Gait normal.      Deep Tendon Reflexes: Reflexes normal.   Psychiatric:         Mood and Affect: Mood normal.         Behavior: Behavior normal.         Thought Content: Thought content normal.          Judgment: Judgment normal.         Assessment:       1. Type 2 diabetes mellitus without complication, without long-term current use of insulin    2. Constipation, unspecified constipation type    3. Nausea    4. Dry mouth    5. Osteoporosis, unspecified osteoporosis type, unspecified pathological fracture presence        Plan:    Erica was seen today for nausea and constipation.    Diagnoses and all orders for this visit:    Type 2 diabetes mellitus without complication, without long-term current use of insulin  -     Ambulatory referral/consult to Endocrinology; Future    Constipation        - Continue current medications including fiber        - Increase fluid intake        -     Intake prunes or prune juice (can mix prune juice with apple or cranberry juice)        - Perform exercises    Nausea       - Continue taking zofran as needed       - [place] diet    Dry mouth       -   Biotene mouthwash as need for dry mouth    Osteoporosis, unspecified osteoporosis type, unspecified pathological fracture presence  -     DXA Bone Density Spine And Hip; Future      My concerns is that the patient has been upper quadrant pain, stool digging, and nausea which is GI in nature. She was seen by gastroenterology and multiple testing including EGD and colonoscopy. The constipation could possibly be IBS, poor diet/excercise, diabetes mellitus, or hormonal disturbance. She has increased thirst which could be from her diabetes or dehydration. Refill of zofran given for nausea which she states was effective. I am thinking to see if endocrine could can assess for endocrine disease and for rare causes like Chagas or Hirschsprung's disease. Referral given for endocrinology for diabetes mellitus and further assessment.     Follow up in about 2 weeks (around 7/10/2020).

## 2020-06-26 NOTE — TELEPHONE ENCOUNTER
She just saw Dr. Britton for this two days ago.  He made changes in her Linzess dose and had other recommendations for her.  Dr. Lovett did a thyroid level yesterday and it was normal.  I do not have anything other to add.  Follow Dr. Britton's recommendations and give it some time.

## 2020-06-26 NOTE — TELEPHONE ENCOUNTER
----- Message from Jose Tonja sent at 6/26/2020  2:42 PM CDT -----  Contact: pt  Type:  RX Refill Request    Who Called:  pt  Refill or New Rx:  refill  RX Name and Strength:  ondansetron (ZOFRAN-ODT) 4 MG TbDL  How is the patient currently taking it? (ex. 1XDay):  Take 1 tablet (4 mg total) by mouth every 6 (six) hours as needed. - Oral  Is this a 30 day or 90 day RX:  30  Preferred Pharmacy with phone number:    [id number] - [place], [place] - [address] [address]  Phone: [phone] Fax: [phone]      Local or Mail Order:  local  Ordering Provider:  Carli Ambrocio MD  Best Call Back Number:  [phone]  Additional Information:

## 2020-06-26 NOTE — TELEPHONE ENCOUNTER
Patient says she saw Ms. Aguila today in clinic-was told to continue Zofran-she is out of it. [facility]

## 2020-06-26 NOTE — TELEPHONE ENCOUNTER
Crestor 20 mg sent to [facility].  Same dose had her at good control previously so we can check lab with her next diabetic check in late December.  Orders are in for CMP, A1c, and lipid panel

## 2020-06-26 NOTE — TELEPHONE ENCOUNTER
Patient informed. She is willing to go back on [facility]. [facility].   Need follow up lab orders. Can be done any day

## 2020-06-28 ENCOUNTER — TELEPHONE (OUTPATIENT)
Dept: FAMILY MEDICINE | Facility: CLINIC | Age: 75
End: 2020-06-28

## 2020-06-28 DIAGNOSIS — R11.0 NAUSEA: ICD-10-CM

## 2020-06-28 RX ORDER — ONDANSETRON 4 MG/1
4 TABLET, ORALLY DISINTEGRATING ORAL EVERY 6 HOURS PRN
Qty: 20 TABLET | Refills: 1 | Status: SHIPPED | OUTPATIENT
Start: 2020-06-28 | End: 2020-06-30 | Stop reason: SINTOL

## 2020-06-30 ENCOUNTER — OFFICE VISIT (OUTPATIENT)
Dept: FAMILY MEDICINE | Facility: CLINIC | Age: 75
End: 2020-06-30
Attending: FAMILY MEDICINE
Payer: MEDICARE

## 2020-06-30 VITALS
SYSTOLIC BLOOD PRESSURE: 126 MMHG | TEMPERATURE: 97 F | OXYGEN SATURATION: 99 % | HEIGHT: 65 IN | DIASTOLIC BLOOD PRESSURE: 66 MMHG | BODY MASS INDEX: 26.23 KG/M2 | WEIGHT: 157.44 LBS | RESPIRATION RATE: 16 BRPM | HEART RATE: 82 BPM

## 2020-06-30 DIAGNOSIS — K59.00 CONSTIPATION, UNSPECIFIED CONSTIPATION TYPE: ICD-10-CM

## 2020-06-30 DIAGNOSIS — K21.00 GASTROESOPHAGEAL REFLUX DISEASE WITH ESOPHAGITIS: ICD-10-CM

## 2020-06-30 DIAGNOSIS — F41.9 ANXIETY: ICD-10-CM

## 2020-06-30 DIAGNOSIS — K58.1 IRRITABLE BOWEL SYNDROME WITH CONSTIPATION: Primary | ICD-10-CM

## 2020-06-30 DIAGNOSIS — D50.9 IRON DEFICIENCY ANEMIA, UNSPECIFIED IRON DEFICIENCY ANEMIA TYPE: ICD-10-CM

## 2020-06-30 DIAGNOSIS — D12.6 ADENOMATOUS POLYP OF COLON, UNSPECIFIED PART OF COLON: ICD-10-CM

## 2020-06-30 DIAGNOSIS — K57.90 DIVERTICULOSIS: ICD-10-CM

## 2020-06-30 DIAGNOSIS — E11.9 CONTROLLED TYPE 2 DIABETES MELLITUS WITHOUT COMPLICATION, WITHOUT LONG-TERM CURRENT USE OF INSULIN: ICD-10-CM

## 2020-06-30 DIAGNOSIS — F32.A DEPRESSION, UNSPECIFIED DEPRESSION TYPE: ICD-10-CM

## 2020-06-30 LAB — PYRIDOXAL SERPL-MCNC: 12 UG/L (ref 5–50)

## 2020-06-30 PROCEDURE — 3078F DIAST BP <80 MM HG: CPT | Mod: CPTII,S$GLB,, | Performed by: FAMILY MEDICINE

## 2020-06-30 PROCEDURE — 1125F PR PAIN SEVERITY QUANTIFIED, PAIN PRESENT: ICD-10-PCS | Mod: S$GLB,,, | Performed by: FAMILY MEDICINE

## 2020-06-30 PROCEDURE — 99499 UNLISTED E&M SERVICE: CPT | Mod: S$GLB,,, | Performed by: FAMILY MEDICINE

## 2020-06-30 PROCEDURE — 3074F PR MOST RECENT SYSTOLIC BLOOD PRESSURE < 130 MM HG: ICD-10-PCS | Mod: CPTII,S$GLB,, | Performed by: FAMILY MEDICINE

## 2020-06-30 PROCEDURE — 99214 OFFICE O/P EST MOD 30 MIN: CPT | Mod: S$GLB,,, | Performed by: FAMILY MEDICINE

## 2020-06-30 PROCEDURE — 3074F SYST BP LT 130 MM HG: CPT | Mod: CPTII,S$GLB,, | Performed by: FAMILY MEDICINE

## 2020-06-30 PROCEDURE — 1159F MED LIST DOCD IN RCRD: CPT | Mod: S$GLB,,, | Performed by: FAMILY MEDICINE

## 2020-06-30 PROCEDURE — 3078F PR MOST RECENT DIASTOLIC BLOOD PRESSURE < 80 MM HG: ICD-10-PCS | Mod: CPTII,S$GLB,, | Performed by: FAMILY MEDICINE

## 2020-06-30 PROCEDURE — 99499 RISK ADDL DX/OHS AUDIT: ICD-10-PCS | Mod: S$GLB,,, | Performed by: FAMILY MEDICINE

## 2020-06-30 PROCEDURE — 1101F PT FALLS ASSESS-DOCD LE1/YR: CPT | Mod: CPTII,S$GLB,, | Performed by: FAMILY MEDICINE

## 2020-06-30 PROCEDURE — 1101F PR PT FALLS ASSESS DOC 0-1 FALLS W/OUT INJ PAST YR: ICD-10-PCS | Mod: CPTII,S$GLB,, | Performed by: FAMILY MEDICINE

## 2020-06-30 PROCEDURE — 1159F PR MEDICATION LIST DOCUMENTED IN MEDICAL RECORD: ICD-10-PCS | Mod: S$GLB,,, | Performed by: FAMILY MEDICINE

## 2020-06-30 PROCEDURE — 99999 PR PBB SHADOW E&M-EST. PATIENT-LVL IV: ICD-10-PCS | Mod: PBBFAC,,, | Performed by: FAMILY MEDICINE

## 2020-06-30 PROCEDURE — 99999 PR PBB SHADOW E&M-EST. PATIENT-LVL IV: CPT | Mod: PBBFAC,,, | Performed by: FAMILY MEDICINE

## 2020-06-30 PROCEDURE — 1125F AMNT PAIN NOTED PAIN PRSNT: CPT | Mod: S$GLB,,, | Performed by: FAMILY MEDICINE

## 2020-06-30 PROCEDURE — 99214 PR OFFICE/OUTPT VISIT, EST, LEVL IV, 30-39 MIN: ICD-10-PCS | Mod: S$GLB,,, | Performed by: FAMILY MEDICINE

## 2020-06-30 RX ORDER — METFORMIN HYDROCHLORIDE 500 MG/1
500 TABLET, EXTENDED RELEASE ORAL DAILY
Start: 2020-06-30 | End: 2020-07-13 | Stop reason: SDUPTHER

## 2020-06-30 RX ORDER — PANTOPRAZOLE SODIUM 40 MG/1
40 TABLET, DELAYED RELEASE ORAL DAILY
COMMUNITY
Start: 2020-05-20 | End: 2020-06-30 | Stop reason: SINTOL

## 2020-06-30 RX ORDER — DOXEPIN HYDROCHLORIDE 10 MG/1
10 CAPSULE ORAL NIGHTLY
Qty: 30 CAPSULE | Refills: 5 | Status: SHIPPED | OUTPATIENT
Start: 2020-06-30 | End: 2021-04-19

## 2020-06-30 NOTE — PROGRESS NOTES
Subjective:       Patient ID: Erica Masterson is a 74 y.o. female.    Chief Complaint: Follow-up    74-year-old female with an extensive history of diabetes, hypertension, hyperlipidemia, peripheral arterial disease, multinodular goiter, anxiety and depression, osteoarthritis, adenomatous colon polyps, diverticulosis in stage 3 chronic kidney disease comes in for another follow-up on diabetes and abdominal pain.  She reports that she has been experiencing abdominal pain, constipation, and cramping since March of this year.  This is her 14th office visit and she has had eight hospital visits since April 1st.  Reviewing all of those visits some of which are with GI, some of which are Urology and a few with neurology do not mention abdominal pain until the end of April.  She started out with an into higher right-sided pain with tingling numbness and paresthesias for which she was sent to Neurology.  She was placed on gabapentin.  When she began experiencing the GI problems she was actually admitted to the hospital briefly, she has had a colonoscopy, upper endoscopy, and a camera capsule endoscopy between May 20th and June 4th of this year.  She is had a CT of the abdomen and pelvis with no significant findings other than diverticulosis without diverticulitis and some hydronephrosis on the right side that subsequently cleared on a follow-up retroperitoneal ultrasound.  She has seen urology at least twice for this.  She was also treated on two occasions for urinary tract infection.  She reports intense lower abdominal cramping secondarily generalized with hard impacted stool that she has to disimpact herself.  She also reports abdominal bloating which occurs mostly at night after going to bed and this is associated with generalized trembling and shaking.  Since my visit with her May 15 she has dropped 4 lb which is probably not very significant in terms of the number of visits and the complexity of her complaints.  The  patient has been placed on fiber supplements, probiotics, low-dose Linzess later increased to high dose, hyoscyamine, pan appraise all which was later stopped and replaced with Pepcid which was itself stopped.  She has been taking Zofran and a host of other medications none of which have given her any relief by her history.  Her symptoms have actually increased possibly since the Linzess was increased and possibly aggravated by the Zofran that she is taking for nausea.  All in all, seven of her medications are prone to causing constipation and almost all of the others have some GI side effects.  Interesting to note, her symptoms started after she was taken off of metformin and switch to Januvia.  She brings in her blood sugar logs which are uniformly well controlled with morning glucose is ranging from about 71 to 121 the vast majority of them in the 80 to 120 range.  Her most recent A1c done June 25 was 6.0 and her TSH done on the same day was 1.252.    Past Medical History:  No date: Allergy  No date: Anxiety  No date: Cataract  No date: Colon polyp  4/3/2012: Degenerative arthritis of knee  No date: Diverticulosis  No date: GERD (gastroesophageal reflux disease)  No date: Hyperlipidemia  No date: Hypertension  3/26/2012: Multinodular goiter  1/18/2010: Myopathy, unspecified  3/24/2020: New onset type 2 diabetes mellitus  3/24/2020: New onset type 2 diabetes mellitus  No date: Tuberculosis    Past Surgical History:  2015: BREAST BIOPSY; Left      Comment:  benign  12/2/15: COLONOSCOPY      Comment:  Dr. Britton, multiple polyps, recheck five years-three                years if more than 2 polyps are adenomatous  12/2/2015: COLONOSCOPY; N/A  3/20/2019: COLONOSCOPY; N/A      Comment:  Procedure: COLONOSCOPY;  Surgeon: Jose Britton MD;                Location: [facility];  Service: Endoscopy;  Laterality:                N/A;  5/20/2020: COLONOSCOPY; N/A      Comment:  Dr. Britton; internal hemorrhoids;  diverticulosis;                polyps removed; repeat in 3 years  5/20/2020: ESOPHAGOGASTRODUODENOSCOPY; N/A      Comment:  Dr. Britton; small hiatal hernia; gastritis; 8 gastric                polyps removed  No date: FOOT SURGERY  No date: HYSTERECTOMY  6/4/2020: INTRALUMINAL GASTROINTESTINAL TRACT IMAGING VIA CAPSULE; N/A      Comment:  Procedure: IMAGING PROCEDURE, GI TRACT, INTRALUMINAL,                VIA CAPSULE;  Surgeon: Jose Britton MD;  Location:                [facility];  Service: Endoscopy;  Laterality: N/A;  06/06/2013: KNEE SURGERY      Comment:  right knee tear Dr Iverson   1966: LUNG LOBECTOMY      Comment:  right middle lobectomy, due to Tb  No date: OOPHORECTOMY  No date: SHOULDER SURGERY      Comment:  francis     Current Outpatient Medications on File Prior to Visit:  aspirin (ECOTRIN) 81 MG EC tablet, Take 162 mg by mouth once daily. , Disp: , Rfl:   blood sugar diagnostic (BLOOD GLUCOSE TEST) Strp, True Metrix glucose strips check once daily, Disp: 100 each, Rfl: 3  blood-glucose meter kit, True Metrix glucometer check glucose once daily, Disp: 1 each, Rfl: 0  Lactobacillus rhamnosus GG (CULTURELLE) 15 billion cell capsule, Take 1 capsule by mouth once daily., Disp: , Rfl:   lancets Misc, True Metrix lancets check once daily, Disp: 100 each, Rfl: 3  linaCLOtide (LINZESS) 290 mcg Cap capsule, Take 1 capsule (290 mcg total) by mouth once daily., Disp: 90 capsule, Rfl: 3  losartan (COZAAR) 100 MG tablet, Take 1 tablet (100 mg total) by mouth once daily., Disp: 90 tablet, Rfl: 3  peg 400-hypromellose-glycerin (DRY EYE RELIEF) 1-0.2-0.2 % Drop, Apply 1 drop to eye 2 (two) times daily as needed., Disp: , Rfl:   psyllium (METAMUCIL) powder, Take 1 packet by mouth 2 (two) times daily as needed., Disp: , Rfl:   rosuvastatin (CRESTOR) 20 MG tablet, Take 1 tablet (20 mg total) by mouth every evening., Disp: 90 tablet, Rfl: 3  saliva substitute combo no.9 (BIOTENE DRY MOUTH ORAL RINSE MM), 1 Dose by  Mucous Membrane route once daily., Disp: , Rfl:   (DISCONTINUED) gabapentin (NEURONTIN) 300 MG capsule, Take 1 capsule (300 mg total) by mouth 3 (three) times daily. (Patient taking differently: Take 300 mg by mouth every other day. At night), Disp: 90 capsule, Rfl: 11  (DISCONTINUED) ondansetron (ZOFRAN-ODT) 4 MG TbDL, Take 1 tablet (4 mg total) by mouth every 6 (six) hours as needed., Disp: 20 tablet, Rfl: 1  (DISCONTINUED) pantoprazole (PROTONIX) 40 MG tablet, Take 40 mg by mouth once daily., Disp: , Rfl:   (DISCONTINUED) SITagliptin (JANUVIA) 25 MG Tab, Take 1 tablet (25 mg total) by mouth once daily., Disp: 30 tablet, Rfl: 5  (DISCONTINUED) carboxymethylcellulose sodium 0.25 % Drop, Place 1 drop into both eyes every evening. , Disp: , Rfl:   (DISCONTINUED) famotidine (PEPCID) 40 MG tablet, Take 1 tablet (40 mg total) by mouth nightly. (Patient not taking: Reported on 6/30/2020), Disp: 30 tablet, Rfl: 2  (DISCONTINUED) hyoscyamine (ANASPAZ,LEVSIN) 0.125 mg Tab, Take 1 tablet (125 mcg total) by mouth every 4 (four) hours as needed. (Patient not taking: Reported on 6/30/2020), Disp: 12 tablet, Rfl: 1  (DISCONTINUED) magnesium 250 mg Tab, Take 1 tablet by mouth once daily., Disp: , Rfl:   (DISCONTINUED) sulfamethoxazole-trimethoprim 800-160mg (BACTRIM DS) 800-160 mg Tab, Take 1 tablet by mouth 2 (two) times daily. (Patient not taking: Reported on 6/30/2020), Disp: 14 tablet, Rfl: 0    No current facility-administered medications on file prior to visit.     Shingles Vaccine(2 of 3) due on 02/03/2016  DEXA SCAN due on 03/29/2020      Review of Systems   Constitutional: Negative for chills, diaphoresis and fever.   HENT:        She frequently complains of a dry mouth with the blood glucose is an A1c making it unlikely to be due to uncontrolled diabetes.  It may also be due to some of her medications   Respiratory: Negative for chest tightness and shortness of breath.    Cardiovascular: Negative for chest pain and  palpitations.   Gastrointestinal: Positive for abdominal pain, constipation, nausea and vomiting. Negative for blood in stool and diarrhea.   Endocrine: Negative for polydipsia and polyuria.   Genitourinary: Negative for dysuria, frequency and hematuria.   Neurological: Positive for tremors, weakness and numbness. Negative for dizziness, light-headedness and headaches.       Objective:      Physical Exam  Neck:      Musculoskeletal: Normal range of motion and neck supple.   Cardiovascular:      Rate and Rhythm: Normal rate and regular rhythm.      Pulses: Normal pulses.      Heart sounds: Normal heart sounds. No murmur. No friction rub. No gallop.    Pulmonary:      Effort: Pulmonary effort is normal. No respiratory distress.      Breath sounds: Normal breath sounds. No stridor. No wheezing, rhonchi or rales.   Chest:      Chest wall: No tenderness.   Abdominal:      General: Abdomen is flat. Bowel sounds are decreased. There is no distension or abdominal bruit. There are no signs of injury.      Palpations: Abdomen is soft.      Tenderness: There is generalized abdominal tenderness and tenderness in the right upper quadrant, right lower quadrant, epigastric area, periumbilical area, suprapubic area, left upper quadrant and left lower quadrant. There is no right CVA tenderness, left CVA tenderness, guarding or rebound. Negative signs include Rashid's sign and McBurney's sign.      Hernia: No hernia is present.   Psychiatric:         Attention and Perception: Attention normal.         Mood and Affect: Mood is anxious. Mood is not depressed. Affect is not tearful or inappropriate.         Speech: Speech normal.         Behavior: Behavior normal. Behavior is cooperative.         Thought Content: Thought content normal.         Cognition and Memory: Cognition normal.         Judgment: Judgment normal.         Assessment:       1. Irritable bowel syndrome with constipation    2. Controlled type 2 diabetes mellitus  without complication, without long-term current use of insulin    3. Constipation, unspecified constipation type    4. Gastroesophageal reflux disease with esophagitis    5. Diverticulosis    6. Adenomatous polyp of colon, unspecified part of colon    7. Iron deficiency anemia, unspecified iron deficiency anemia type    8. Anxiety    9. Depression, unspecified depression type    10. BMI 26.0-26.9,adult        Plan:       1. Irritable bowel syndrome with constipation  We are eliminating most of her medications beginning with the ones most likely to be causing cramping and constipation.  I will continue the Linzess for the time being and start her on low-dose doxepin at bedtime.  She is to call early next week and if her symptoms are unimproved we may eliminate the Linzess at that time.  She is also to hold the Crestor for the next two weeks but I intend to resume that afterwards.  - doxepin (SINEQUAN) 10 MG capsule; Take 1 capsule (10 mg total) by mouth every evening.  Dispense: 30 capsule; Refill: 5    2. Controlled type 2 diabetes mellitus without complication, without long-term current use of insulin  Patient denied having any trouble with the metformin and her symptoms started after she discontinued it so will resume that and discontinue the Januvia  - metFORMIN (GLUCOPHAGE-XR) 500 MG XR 24hr tablet; Take 1 tablet (500 mg total) by mouth once daily.    3. Constipation, unspecified constipation type  See above, complex symptom pattern compound did by poor historian    4. Gastroesophageal reflux disease with esophagitis  Relatively asymptomatic at this time, patient unclear of whether she is actually taking Pepcid    5. Diverticulosis  No sign of diverticulitis    6. Adenomatous polyp of colon, unspecified part of colon  Last colonoscopy May 20, 2020    7. Iron deficiency anemia, unspecified iron deficiency anemia type  Lab Results   Component Value Date    WBC 4.98 06/25/2020    HGB 11.2 (L) 06/25/2020    HCT 36.9  (L) 06/25/2020    MCV 96 06/25/2020     06/25/2020           8. Anxiety    9. Depression, unspecified depression type    10. BMI 26.0-26.9,adult  Relatively mild weight loss associated with her symptoms

## 2020-07-01 ENCOUNTER — TELEPHONE (OUTPATIENT)
Dept: ENDOCRINOLOGY | Facility: CLINIC | Age: 75
End: 2020-07-01

## 2020-07-01 LAB — VIT B1 BLD-MCNC: 32 UG/L (ref 38–122)

## 2020-07-02 ENCOUNTER — TELEPHONE (OUTPATIENT)
Dept: ENDOCRINOLOGY | Facility: CLINIC | Age: 75
End: 2020-07-02

## 2020-07-07 ENCOUNTER — TELEPHONE (OUTPATIENT)
Dept: FAMILY MEDICINE | Facility: CLINIC | Age: 75
End: 2020-07-07

## 2020-07-07 DIAGNOSIS — R10.9 ABDOMINAL PAIN, UNSPECIFIED ABDOMINAL LOCATION: ICD-10-CM

## 2020-07-07 DIAGNOSIS — R11.0 NAUSEA: Primary | ICD-10-CM

## 2020-07-07 NOTE — TELEPHONE ENCOUNTER
Both of them are not recommended for people over 65 as they can increase risk of falls and fractured hips.  The been till is probably the worst in terms of falling.  Xanax is very habit-forming and difficult to come off of I have spent months trying to wean people off of it in the past without success and I will not start anyone on it or use it in anything other than extremely limited circumstances

## 2020-07-07 NOTE — TELEPHONE ENCOUNTER
Patient advised Xanax is not something  will keep her on due to potential for addiction. Patient asking if Dr. Myers thinks she should fill these scripts.

## 2020-07-07 NOTE — TELEPHONE ENCOUNTER
Patient called- she went to [place] [facility] last night. Nausea, headache, eyes feel tight- when she wakes up she says she has to bat her eyes. Denies dryness of eyes. Tylenol takes care of headache. Wants to go to another gi for second opinion. Referred to Dr. Hartley's group.

## 2020-07-07 NOTE — TELEPHONE ENCOUNTER
----- Message from Latisha Xavier sent at 7/7/2020  9:58 AM CDT -----  Contact: Patient  Type: Needs Medical Advice  Who Called:  Patient  Best Call Back Number:   Additional Information: Calling to speak with Shefali again to update her on two new medications that she was prescribed from another doctor. Xanax 0.25 mg and bentyl 10 mg.

## 2020-07-08 NOTE — TELEPHONE ENCOUNTER
Patient advised not to fill these prescriptions. She will see gi Dr. Hilton Boykin 7/10. Referral generated and records faxed to [phone].

## 2020-07-13 DIAGNOSIS — E11.9 CONTROLLED TYPE 2 DIABETES MELLITUS WITHOUT COMPLICATION, WITHOUT LONG-TERM CURRENT USE OF INSULIN: ICD-10-CM

## 2020-07-13 RX ORDER — METFORMIN HYDROCHLORIDE 500 MG/1
500 TABLET, EXTENDED RELEASE ORAL DAILY
Qty: 90 TABLET | Refills: 1 | Status: SHIPPED | OUTPATIENT
Start: 2020-07-13 | End: 2020-08-03

## 2020-07-13 NOTE — TELEPHONE ENCOUNTER
Patient notified to check with the pharmacy where the medication came from to see if lot number part of recall. She also needs a refill of Metformin.

## 2020-07-13 NOTE — TELEPHONE ENCOUNTER
----- Message from Twila Diamond sent at 7/13/2020  9:46 AM CDT -----  Regarding: Metformin Recall  Contact: pateint  Patient pharmacy sent her a letter stating her metformin was recall please contact patient and advice of replacement [phone] (home)     Case number [id number]

## 2020-07-15 ENCOUNTER — HOSPITAL ENCOUNTER (OUTPATIENT)
Dept: RADIOLOGY | Facility: HOSPITAL | Age: 75
Discharge: HOME | End: 2020-07-15
Attending: FAMILY MEDICINE
Payer: MEDICARE

## 2020-07-15 ENCOUNTER — TELEPHONE (OUTPATIENT)
Dept: GASTROENTEROLOGY | Facility: CLINIC | Age: 75
End: 2020-07-15

## 2020-07-15 DIAGNOSIS — R92.8 ABNORMAL MAMMOGRAM OF RIGHT BREAST: ICD-10-CM

## 2020-07-15 PROCEDURE — 77061 BREAST TOMOSYNTHESIS UNI: CPT | Mod: TC

## 2020-07-15 PROCEDURE — 77065 DX MAMMO INCL CAD UNI: CPT | Mod: TC

## 2020-07-15 PROCEDURE — 77065 MAMMO DIGITAL DIAGNOSTIC RIGHT WITH TOMOSYNTHESIS_CAD: ICD-10-PCS | Mod: 26,,, | Performed by: RADIOLOGY

## 2020-07-15 PROCEDURE — 77065 DX MAMMO INCL CAD UNI: CPT | Mod: 26,,, | Performed by: RADIOLOGY

## 2020-07-15 PROCEDURE — 77061 BREAST TOMOSYNTHESIS UNI: CPT | Mod: 26,,, | Performed by: RADIOLOGY

## 2020-07-15 PROCEDURE — 77061 MAMMO DIGITAL DIAGNOSTIC RIGHT WITH TOMOSYNTHESIS_CAD: ICD-10-PCS | Mod: 26,,, | Performed by: RADIOLOGY

## 2020-07-15 NOTE — TELEPHONE ENCOUNTER
Patient states she is seeing Dr. Boykin now for a second opinion but he didn't have all her paper work so she is going back to see him but wants you to know she is losing weight and abd pain when eating and after.

## 2020-07-15 NOTE — TELEPHONE ENCOUNTER
----- Message from Latisha Bennett sent at 7/15/2020 12:43 PM CDT -----  Regarding: advice  Contact: self  Type: Needs Medical Advice  Who Called:    Symptoms (please be specific):    How long has patient had these symptoms:    Pharmacy name and phone #:    Best Call Back Number: [phone]  Additional Information: Patient states her stomach pain after eating and while eating.

## 2020-07-16 ENCOUNTER — HOSPITAL ENCOUNTER (EMERGENCY)
Facility: HOSPITAL | Age: 75
Discharge: HOME | End: 2020-07-16
Attending: EMERGENCY MEDICINE
Payer: MEDICARE

## 2020-07-16 ENCOUNTER — OFFICE VISIT (OUTPATIENT)
Dept: OPTOMETRY | Facility: CLINIC | Age: 75
End: 2020-07-16
Payer: MEDICARE

## 2020-07-16 ENCOUNTER — TELEPHONE (OUTPATIENT)
Dept: OPHTHALMOLOGY | Facility: CLINIC | Age: 75
End: 2020-07-16

## 2020-07-16 VITALS
OXYGEN SATURATION: 98 % | SYSTOLIC BLOOD PRESSURE: 142 MMHG | RESPIRATION RATE: 16 BRPM | WEIGHT: 157.44 LBS | DIASTOLIC BLOOD PRESSURE: 68 MMHG | TEMPERATURE: 98 F | HEART RATE: 74 BPM | HEIGHT: 65 IN | BODY MASS INDEX: 26.23 KG/M2

## 2020-07-16 DIAGNOSIS — R25.1 TREMOR: Primary | ICD-10-CM

## 2020-07-16 DIAGNOSIS — H04.123 DRY EYE SYNDROME, BILATERAL: Primary | ICD-10-CM

## 2020-07-16 LAB
ALBUMIN SERPL BCP-MCNC: 3.7 G/DL (ref 3.5–5.2)
ALP SERPL-CCNC: 48 U/L (ref 55–135)
ALT SERPL W/O P-5'-P-CCNC: 79 U/L (ref 10–44)
ANION GAP SERPL CALC-SCNC: 10 MMOL/L (ref 8–16)
AST SERPL-CCNC: 40 U/L (ref 10–40)
BILIRUB SERPL-MCNC: 0.5 MG/DL (ref 0.1–1)
BUN SERPL-MCNC: 13 MG/DL (ref 8–23)
CALCIUM SERPL-MCNC: 8.9 MG/DL (ref 8.7–10.5)
CHLORIDE SERPL-SCNC: 104 MMOL/L (ref 95–110)
CO2 SERPL-SCNC: 27 MMOL/L (ref 23–29)
CREAT SERPL-MCNC: 1.1 MG/DL (ref 0.5–1.4)
EST. GFR  (AFRICAN AMERICAN): 56 ML/MIN/1.73 M^2
EST. GFR  (NON AFRICAN AMERICAN): 49 ML/MIN/1.73 M^2
GLUCOSE SERPL-MCNC: 110 MG/DL (ref 70–110)
MAGNESIUM SERPL-MCNC: 1.9 MG/DL (ref 1.6–2.6)
PHOSPHATE SERPL-MCNC: 3.3 MG/DL (ref 2.7–4.5)
POTASSIUM SERPL-SCNC: 3.8 MMOL/L (ref 3.5–5.1)
PROT SERPL-MCNC: 6.9 G/DL (ref 6–8.4)
SODIUM SERPL-SCNC: 141 MMOL/L (ref 136–145)

## 2020-07-16 PROCEDURE — 99999 PR PBB SHADOW E&M-EST. PATIENT-LVL III: CPT | Mod: PBBFAC,,, | Performed by: OPTOMETRIST

## 2020-07-16 PROCEDURE — 92012 INTRM OPH EXAM EST PATIENT: CPT | Mod: S$GLB,,, | Performed by: OPTOMETRIST

## 2020-07-16 PROCEDURE — 92012 PR EYE EXAM, EST PATIENT,INTERMED: ICD-10-PCS | Mod: S$GLB,,, | Performed by: OPTOMETRIST

## 2020-07-16 PROCEDURE — 99999 PR PBB SHADOW E&M-EST. PATIENT-LVL III: ICD-10-PCS | Mod: PBBFAC,,, | Performed by: OPTOMETRIST

## 2020-07-16 PROCEDURE — 99283 EMERGENCY DEPT VISIT LOW MDM: CPT

## 2020-07-16 PROCEDURE — 36415 COLL VENOUS BLD VENIPUNCTURE: CPT

## 2020-07-16 PROCEDURE — 84100 ASSAY OF PHOSPHORUS: CPT

## 2020-07-16 PROCEDURE — 25000003 PHARM REV CODE 250: Performed by: EMERGENCY MEDICINE

## 2020-07-16 PROCEDURE — 83735 ASSAY OF MAGNESIUM: CPT

## 2020-07-16 PROCEDURE — 80053 COMPREHEN METABOLIC PANEL: CPT

## 2020-07-16 RX ORDER — PROPRANOLOL HYDROCHLORIDE 20 MG/1
20 TABLET ORAL 3 TIMES DAILY
Qty: 90 TABLET | Refills: 0 | Status: SHIPPED | OUTPATIENT
Start: 2020-07-16 | End: 2020-08-03

## 2020-07-16 RX ORDER — PROPRANOLOL HYDROCHLORIDE 20 MG/1
20 TABLET ORAL
Status: COMPLETED | OUTPATIENT
Start: 2020-07-16 | End: 2020-07-16

## 2020-07-16 RX ORDER — ALPRAZOLAM 0.25 MG/1
0.5 TABLET ORAL
Status: COMPLETED | OUTPATIENT
Start: 2020-07-16 | End: 2020-07-16

## 2020-07-16 RX ADMIN — ALPRAZOLAM 0.5 MG: 0.25 TABLET ORAL at 02:07

## 2020-07-16 RX ADMIN — PROPRANOLOL HYDROCHLORIDE 20 MG: 20 TABLET ORAL at 02:07

## 2020-07-16 NOTE — TELEPHONE ENCOUNTER
Called pt concerning eye problems. Pt states has been having trouble getting eyes to stay open and getting headaches. Wanted to be seen today in [place]. Scheduled her with Dr Trinidad today.

## 2020-07-16 NOTE — ED NOTES
Pt attempting to get in contact with  who will be ride home. Pt to wait in ED room until able to contact for comfort. Given blanket and call light. Discussed AVS at this time. Will continue to monitor.

## 2020-07-16 NOTE — ED PROVIDER NOTES
Encounter Date: 7/16/2020    SCRIBE #1 NOTE: IKaylah, sarah scribing for, and in the presence of, Eitan Oneil MD.       History   No chief complaint on file.      Time seen by provider: 2:03 AM on 07/16/2020    Erica Masterson is a 74 y.o. female with a PMHx of HTN, DM type 2, HLD, GERD and anxiety who presents to the ED via EMS with an onset of worsening chronic leg tremors that woke her up from sleep this morning. She also reports some facial tingling when she woke up. The patient endorses taking all her daily medications as prescribed. She reportedly started taking Prucalopride for her GERD around 7/7. The patient denies diarrhea or any other symptoms at this time. PSHx includes EGD, hysterectomy, and lung lobectomy.      The history is provided by the patient and the EMS personnel.     Review of patient's allergies indicates:   Allergen Reactions    No known drug allergies      Past Medical History:   Diagnosis Date    Allergy     Anxiety     Cataract     Colon polyp     Degenerative arthritis of knee 4/3/2012    Diverticulosis     GERD (gastroesophageal reflux disease)     Hyperlipidemia     Hypertension     Multinodular goiter 3/26/2012    Myopathy, unspecified 1/18/2010    New onset type 2 diabetes mellitus 3/24/2020    New onset type 2 diabetes mellitus 3/24/2020    Tuberculosis      Past Surgical History:   Procedure Laterality Date    BREAST BIOPSY Left 2015    benign    COLONOSCOPY  12/2/15    Dr. Britton, multiple polyps, recheck five years-three years if more than 2 polyps are adenomatous    COLONOSCOPY N/A 12/2/2015    COLONOSCOPY N/A 3/20/2019    Procedure: COLONOSCOPY;  Surgeon: Jose Britton MD;  Location: [facility];  Service: Endoscopy;  Laterality: N/A;    COLONOSCOPY N/A 5/20/2020    Dr. Britton; internal hemorrhoids; diverticulosis; polyps removed; repeat in 3 years    ESOPHAGOGASTRODUODENOSCOPY N/A 5/20/2020    Dr. Britton; small hiatal hernia; gastritis; 8  gastric polyps removed    FOOT SURGERY      HYSTERECTOMY      INTRALUMINAL GASTROINTESTINAL TRACT IMAGING VIA CAPSULE N/A 6/4/2020    Procedure: IMAGING PROCEDURE, GI TRACT, INTRALUMINAL, VIA CAPSULE;  Surgeon: Jose Britton MD;  Location: [facility];  Service: Endoscopy;  Laterality: N/A;    KNEE SURGERY  06/06/2013    right knee tear Dr Iverson     LUNG LOBECTOMY  1966    right middle lobectomy, due to Tb    OOPHORECTOMY      SHOULDER SURGERY      francis      Family History   Problem Relation Age of Onset    Colon cancer Other     Cancer Mother     Hypertension Mother     Hyperlipidemia Mother     Cancer Brother     Hypertension Father     Emphysema Father     Diabetes Son     Hypertension Son     Alcohol abuse Son     Diabetes Maternal Aunt     Alzheimer's disease Maternal Uncle     Cancer Maternal Grandmother     No Known Problems Daughter     Dementia Sister     Alzheimer's disease Sister     Breast cancer Sister 60    Obesity Paternal Uncle     Prostate cancer Other     Melanoma Neg Hx     Psoriasis Neg Hx     Lupus Neg Hx     Eczema Neg Hx     Amblyopia Neg Hx     Blindness Neg Hx     Cataracts Neg Hx     Glaucoma Neg Hx     Macular degeneration Neg Hx     Retinal detachment Neg Hx     Strabismus Neg Hx     Stroke Neg Hx     Thyroid disease Neg Hx      Social History     Tobacco Use    Smoking status: Never Smoker    Smokeless tobacco: Never Used   Substance Use Topics    Alcohol use: Not Currently     Comment: stopped 2019    Drug use: No     Review of Systems   Constitutional: Negative for fever.   HENT: Negative for sore throat.    Respiratory: Negative for shortness of breath.    Cardiovascular: Negative for chest pain.   Gastrointestinal: Negative for diarrhea and nausea.   Genitourinary: Negative for dysuria.   Musculoskeletal: Negative for back pain.   Skin: Negative for rash.   Neurological: Positive for tremors. Negative for weakness.        + Facial  tingling   Hematological: Does not bruise/bleed easily.       Physical Exam     Initial Vitals   BP Pulse Resp Temp SpO2   -- -- -- -- --      MAP       --         Physical Exam    Nursing note and vitals reviewed.  Constitutional: She appears well-developed and well-nourished.  Non-toxic appearance. No distress.   HENT:   Head: Normocephalic and atraumatic.   Eyes: EOM are normal. Pupils are equal, round, and reactive to light.   Neck: Normal range of motion. Neck supple. No neck rigidity. No JVD present.   Cardiovascular: Normal rate, regular rhythm, normal heart sounds and intact distal pulses. Exam reveals no gallop and no friction rub.    No murmur heard.  Pulmonary/Chest: Breath sounds normal. She has no wheezes. She has no rhonchi. She has no rales.   Abdominal: Soft. Bowel sounds are normal. She exhibits no distension. There is no abdominal tenderness. There is no rebound and no guarding.   Musculoskeletal: Normal range of motion.   Neurological: She is alert and oriented to person, place, and time. She has normal strength and normal reflexes. She displays tremor. No cranial nerve deficit or sensory deficit. She exhibits normal muscle tone. Coordination normal.   Patient exhibits intermittent bilateral tremors in torso and proximal thighs that do not tia with deep nailbed pressure. Tremors seems to be brought on by movement. Patient is able to temporarily subdue them.   Skin: Skin is warm and dry.   Psychiatric: She has a normal mood and affect. Her speech is normal and behavior is normal. She is not actively hallucinating.         ED Course   Procedures  Labs Reviewed - No data to display       Imaging Results    None          Medical Decision Making:   History:   Old Medical Records: I decided to obtain old medical records.  Initial Assessment:   Patient is 74-year-old woman presents to the ED for evaluation of tremors that began this evening.  She has a had a history of tremens in the past but never  this bad.  They woke up from sleep any time she would try to move her legs and she was having difficulty stopping them.  She had no loss of consciousness urinary incontinence or tongue biting.  Lung I witnessed the tremors and they did not tia with deep nailbed pressure.  I checked her electrolytes and there were no significant abnormalities.  She was given 20 mg of propanolol and 0.5 mg of Xanax with resolution of her tremors.  She has close follow-up scheduled with Neurology.  She will be given a prescription for propanolol.  Return precautions discussed.  She is discharged improved in no acute distress.  Clinical Tests:   Lab Tests: Ordered and Reviewed            Scribe Attestation:   Scribe #1: I performed the above scribed service and the documentation accurately describes the services I performed. I attest to the accuracy of the note.     I, Thad Simpson, personally performed the services described in this documentation. All medical record entries made by the scribe were at my direction and in my presence.  I have reviewed the chart and agree that the record reflects my personal performance and is accurate and complete. Eitan Oneil MD.                      Clinical Impression:       ICD-10-CM ICD-9-CM   1. Tremor  R25.1 781.0                                Eitan Oneil MD  07/16/20 0438

## 2020-07-16 NOTE — PROGRESS NOTES
"HPI     Eye Problem      Additional comments: pt c/o trouble opening OU in mornings x 1 month,   "lids feel tight or week" and hard to open // no discharge, redness,   tearing or blurred VA               Eye Pain      Additional comments: some tenderness when rubbing eyes --- symptoms   worse when waking up, gets better throughout day              Dry Eye      Additional comments: +ATS OU bid              Headache      Additional comments: w/ dizziness & light sensitivty              Comments     Hemoglobin A1C       Date                     Value               Ref Range             Status                06/25/2020               6.0 (H)             4.0 - 5.6 %           Final                  Pt states having trouble opening eyes in the morning. Using otc soothe at   bedtime.           Last edited by Anderson Trinidad, OD on 7/16/2020 10:25 AM. (History)            Assessment /Plan     For exam results, see Encounter Report.    Dry eye syndrome, bilateral      LILLIE OU, SPK inferior OS. Discussed ocular affects of dry eyes. Recommend OTC soothe artificial tears three times a day in OU, otc gel drop at bedtime, caution transient blurring of vision with gel drop. Discussed chronicity of LILLIE. RTC if symptoms not alleviated by continued use of artificial tears.     Continue f/u as directed by Dr. Reynoso, return sooner prn if any worsening of symptoms or no resolution.                "

## 2020-07-16 NOTE — DISCHARGE INSTRUCTIONS
Future Appointments   Date Time Provider Department Center   7/23/2020 10:30 AM Nilay Lovett Jr., MD [facility] NEURO [facility]   8/10/2020 10:00 AM Celso Reynoso Jr., MD [address] [place]   12/15/2020  8:30 AM BAY MARTINEZ [facility] [place]

## 2020-07-16 NOTE — TELEPHONE ENCOUNTER
----- Message from Joanie Jorge sent at 7/16/2020  8:55 AM CDT -----  Regarding: same day appt request  Contact: TODD RODRIGUEZ [[id number]]  Type:  Same Day Appointment Request    Caller is requesting a same day appointment.      Name of Caller: TODD RODRIGUEZ [[id number]]  When is the first available appointment? Scheduled 8/10/2020 with Dr Reynoso   Symptoms: stated her eye feels tight when she try to open them and afterwards her head feel dizzy   Best Call Back Number: [phone]

## 2020-07-17 ENCOUNTER — TELEPHONE (OUTPATIENT)
Dept: NEUROLOGY | Facility: CLINIC | Age: 75
End: 2020-07-17

## 2020-07-17 NOTE — TELEPHONE ENCOUNTER
----- Message from Lesley Fournier sent at 7/17/2020  8:26 AM CDT -----  Contact: pt [phone] / cell [phone]     Type:  Sooner Apoointment Request    Caller is requesting a sooner appointment.  Caller declined first available appointment listed below.  Caller will not accept being placed on the waitlist and is requesting a message be sent to doctor.    Name of Caller:  pt  When is the first available appointment?    Askig to  be  fitted  in sooner   Symptoms:     muscle   and   nerve   pain Back Number:   pt [phone] / cell [phone]

## 2020-07-17 NOTE — TELEPHONE ENCOUNTER
----- Message from Crissy Clark sent at 7/17/2020  8:34 AM CDT -----  Regarding: Pt Message  Contact: [phone]  Type: Needs Medical Advice  Who Called:  Patient   Best Call Back Number: [phone]  Additional Information: Patient is requesting a call back from nurse in reference tremors.

## 2020-07-17 NOTE — TELEPHONE ENCOUNTER
Pt is c/o burning and sting sensation to her muscles through out her body.  C/o tremors to her legs and arms(flopping) Went to the ER on 7/15/20.  She had the same symptoms yesterday, called EMS but did not go to the hospital.

## 2020-07-21 ENCOUNTER — TELEPHONE (OUTPATIENT)
Dept: NEUROLOGY | Facility: CLINIC | Age: 75
End: 2020-07-21

## 2020-07-21 NOTE — TELEPHONE ENCOUNTER
----- Message from Joanie Jorge sent at 7/21/2020 10:57 AM CDT -----  Regarding: advice  Contact: TODD RODRIGUEZ [[id number]]  Patient is requesting a call back from the nurse stated she still have tremors in legs and rt arm.    She need advice on whether or not she should be taking the Protonix 40mg and gabapentin.    Please call the patient upon request at phone number [phone] or cell [phone].

## 2020-07-23 ENCOUNTER — OFFICE VISIT (OUTPATIENT)
Dept: NEUROLOGY | Facility: CLINIC | Age: 75
End: 2020-07-23
Payer: MEDICARE

## 2020-07-23 VITALS
BODY MASS INDEX: 25.53 KG/M2 | RESPIRATION RATE: 20 BRPM | DIASTOLIC BLOOD PRESSURE: 68 MMHG | HEART RATE: 96 BPM | SYSTOLIC BLOOD PRESSURE: 141 MMHG | HEIGHT: 65 IN | WEIGHT: 153.25 LBS | TEMPERATURE: 98 F

## 2020-07-23 DIAGNOSIS — R20.2 PARESTHESIA: Primary | ICD-10-CM

## 2020-07-23 DIAGNOSIS — R25.3 JERKING: ICD-10-CM

## 2020-07-23 DIAGNOSIS — E51.9 THIAMINE DEFICIENCY: ICD-10-CM

## 2020-07-23 PROCEDURE — 1126F AMNT PAIN NOTED NONE PRSNT: CPT | Mod: S$GLB,,, | Performed by: PSYCHIATRY & NEUROLOGY

## 2020-07-23 PROCEDURE — 3077F SYST BP >= 140 MM HG: CPT | Mod: CPTII,S$GLB,, | Performed by: PSYCHIATRY & NEUROLOGY

## 2020-07-23 PROCEDURE — 99999 PR PBB SHADOW E&M-EST. PATIENT-LVL V: ICD-10-PCS | Mod: PBBFAC,,, | Performed by: PSYCHIATRY & NEUROLOGY

## 2020-07-23 PROCEDURE — 1101F PR PT FALLS ASSESS DOC 0-1 FALLS W/OUT INJ PAST YR: ICD-10-PCS | Mod: CPTII,S$GLB,, | Performed by: PSYCHIATRY & NEUROLOGY

## 2020-07-23 PROCEDURE — 99214 OFFICE O/P EST MOD 30 MIN: CPT | Mod: S$GLB,,, | Performed by: PSYCHIATRY & NEUROLOGY

## 2020-07-23 PROCEDURE — 3078F PR MOST RECENT DIASTOLIC BLOOD PRESSURE < 80 MM HG: ICD-10-PCS | Mod: CPTII,S$GLB,, | Performed by: PSYCHIATRY & NEUROLOGY

## 2020-07-23 PROCEDURE — 1126F PR PAIN SEVERITY QUANTIFIED, NO PAIN PRESENT: ICD-10-PCS | Mod: S$GLB,,, | Performed by: PSYCHIATRY & NEUROLOGY

## 2020-07-23 PROCEDURE — 3078F DIAST BP <80 MM HG: CPT | Mod: CPTII,S$GLB,, | Performed by: PSYCHIATRY & NEUROLOGY

## 2020-07-23 PROCEDURE — 99999 PR PBB SHADOW E&M-EST. PATIENT-LVL V: CPT | Mod: PBBFAC,,, | Performed by: PSYCHIATRY & NEUROLOGY

## 2020-07-23 PROCEDURE — 99214 PR OFFICE/OUTPT VISIT, EST, LEVL IV, 30-39 MIN: ICD-10-PCS | Mod: S$GLB,,, | Performed by: PSYCHIATRY & NEUROLOGY

## 2020-07-23 PROCEDURE — 1159F MED LIST DOCD IN RCRD: CPT | Mod: S$GLB,,, | Performed by: PSYCHIATRY & NEUROLOGY

## 2020-07-23 PROCEDURE — 1159F PR MEDICATION LIST DOCUMENTED IN MEDICAL RECORD: ICD-10-PCS | Mod: S$GLB,,, | Performed by: PSYCHIATRY & NEUROLOGY

## 2020-07-23 PROCEDURE — 1101F PT FALLS ASSESS-DOCD LE1/YR: CPT | Mod: CPTII,S$GLB,, | Performed by: PSYCHIATRY & NEUROLOGY

## 2020-07-23 PROCEDURE — 3077F PR MOST RECENT SYSTOLIC BLOOD PRESSURE >= 140 MM HG: ICD-10-PCS | Mod: CPTII,S$GLB,, | Performed by: PSYCHIATRY & NEUROLOGY

## 2020-07-23 RX ORDER — GABAPENTIN 100 MG/1
100 CAPSULE ORAL NIGHTLY
COMMUNITY
End: 2020-07-24 | Stop reason: SDUPTHER

## 2020-07-23 RX ORDER — SUCRALFATE 1 G/1
1 TABLET ORAL 4 TIMES DAILY
COMMUNITY
End: 2020-08-03

## 2020-07-23 RX ORDER — PANTOPRAZOLE SODIUM 40 MG/1
40 TABLET, DELAYED RELEASE ORAL 2 TIMES DAILY
COMMUNITY
End: 2020-09-14

## 2020-07-23 NOTE — PROGRESS NOTES
"Date of service:  7/23/2020    Chief complaint:  Paresthesias    Interval history:  The patient is a 74 y.o. female seen for paresthesias.  Since she was last here, she has continued to have issues with paresthesias.  She actually has not been taking the Neurontin I wrote for her.  She also reports an episode of "tremors" that resulted in her presenting to the ER.  She had jerking movements in the bilateral LEs and LUE that she could not control.  She remained conscious with these movements.    History of present illness:  The patient is a 74 y.o. female referred for evaluation of paresthesia.  I saw her in 2015 for complaints of numbness. The patient reports that this has gradually progressed prior to worsening abruptly over the last several months. The sensation is located in the bilateral lower extremities to the thighs and in the hands.  The feeling is characterized as pins and needles and is severe in intensity.  She notes no clear exacerbating or relieving factors.  She also noticed associated muscle cramps.  The sensation is present continuously.    Past Medical History:   Diagnosis Date    Allergy     Anxiety     Cataract     Colon polyp     Degenerative arthritis of knee 4/3/2012    Diverticulosis     GERD (gastroesophageal reflux disease)     Hyperlipidemia     Hypertension     Multinodular goiter 3/26/2012    Myopathy, unspecified 1/18/2010    New onset type 2 diabetes mellitus 3/24/2020    New onset type 2 diabetes mellitus 3/24/2020    Tuberculosis        Past Surgical History:   Procedure Laterality Date    BREAST BIOPSY Left 2015    benign    COLONOSCOPY  12/2/15    Dr. Britton, multiple polyps, recheck five years-three years if more than 2 polyps are adenomatous    COLONOSCOPY N/A 12/2/2015    COLONOSCOPY N/A 3/20/2019    Procedure: COLONOSCOPY;  Surgeon: Jose Britton MD;  Location: [facility];  Service: Endoscopy;  Laterality: N/A;    COLONOSCOPY N/A 5/20/2020    Dr. Britton; " internal hemorrhoids; diverticulosis; polyps removed; repeat in 3 years    ESOPHAGOGASTRODUODENOSCOPY N/A 5/20/2020    Dr. Britton; small hiatal hernia; gastritis; 8 gastric polyps removed    FOOT SURGERY      HYSTERECTOMY      INTRALUMINAL GASTROINTESTINAL TRACT IMAGING VIA CAPSULE N/A 6/4/2020    Procedure: IMAGING PROCEDURE, GI TRACT, INTRALUMINAL, VIA CAPSULE;  Surgeon: Jose Britton MD;  Location[facility];  Service: Endoscopy;  Laterality: N/A;    KNEE SURGERY  06/06/2013    right knee tear Dr Iverson     LUNG LOBECTOMY  1966    right middle lobectomy, due to Tb    OOPHORECTOMY      SHOULDER SURGERY      francis        Family History   Problem Relation Age of Onset    Colon cancer Other     Cancer Mother     Hypertension Mother     Hyperlipidemia Mother     Cancer Brother     Hypertension Father     Emphysema Father     Diabetes Son     Hypertension Son     Alcohol abuse Son     Diabetes Maternal Aunt     Alzheimer's disease Maternal Uncle     Cancer Maternal Grandmother     No Known Problems Daughter     Dementia Sister     Alzheimer's disease Sister     Breast cancer Sister 60    Obesity Paternal Uncle     Prostate cancer Other     Melanoma Neg Hx     Psoriasis Neg Hx     Lupus Neg Hx     Eczema Neg Hx     Amblyopia Neg Hx     Blindness Neg Hx     Cataracts Neg Hx     Glaucoma Neg Hx     Macular degeneration Neg Hx     Retinal detachment Neg Hx     Strabismus Neg Hx     Stroke Neg Hx     Thyroid disease Neg Hx        Social History     Socioeconomic History    Marital status:      Spouse name: Not on file    Number of children: Not on file    Years of education: Not on file    Highest education level: Not on file   Occupational History    Not on file   Social Needs    Financial resource strain: Not on file    Food insecurity     Worry: Not on file     Inability: Not on file    Transportation needs     Medical: Not on file     Non-medical: Not on file  "  Tobacco Use    Smoking status: Never Smoker    Smokeless tobacco: Never Used   Substance and Sexual Activity    Alcohol use: Not Currently     Comment: stopped 2019    Drug use: No    Sexual activity: Not Currently   Lifestyle    Physical activity     Days per week: Not on file     Minutes per session: Not on file    Stress: Not on file   Relationships    Social connections     Talks on phone: Not on file     Gets together: Not on file     Attends [employer] service: Not on file     Active member of club or organization: Not on file     Attends meetings of clubs or organizations: Not on file     Relationship status: Not on file   Other Topics Concern    Are you pregnant or think you may be? Not Asked    Breast-feeding Not Asked   Social History Narrative    Not on file       Review of patient's allergies indicates:   Allergen Reactions    No known drug allergies         Review of Systems  General/Constitutional:  No unintentional weight loss, No change in appetite  Eyes/Vision:  No change in vision, No double vision  ENT:  No frequent nose bleeds, No ringing in the ears  Respiratory:  No cough, No wheezing  Cardiovascular:  No chest pain, No palpitations  Gastrointestinal:  No jaundice, No nausea/vomiting  Genitourinary:  No incontinence, No burning with urination  Hematologic/Lymphatic:  No easy bruising/bleeding, No night sweats  Neurological:  + numbness, No weakness  Endocrine:  No fatigue, No heat/cold intolerance  Allergy/Immunologic:  No fevers, No chills  Musculoskeletal:  + muscle pain, No joint pain   Psychiatric:  No thoughts of harming self/others, No depression  Integumentary:  No rashes, No sores that do not heal    Physical exam:  BP (!) 141/68   Pulse 96   Temp 98.1 °F (36.7 °C)   Resp 20   Ht 5' 5" (1.651 m)   Wt 69.5 kg (153 lb 3.5 oz)   BMI 25.50 kg/m²   General: Well developed, well nourished.  No acute distress.  ENT: Mucus membranes moist.  Atraumatic external nose and " "ears.  Lymphatic: No apparent lymphadenopathy.  Cardiovascular: Regular rate and rhythm.  Pulmonary: No increased work of breathing.  Abdomen/GI: No guarding.  Musculoskeletal: No obvious joint deformities, moves all extremities well.    Neurological exam:  Mental status: Awake and alert.  Oriented x4.  Speech fluent and appropriate.  Recent and remote memory appear to be intact.  Fund of knowledge normal.  Cranial nerves: Pupils equal round and reactive to light, extraocular movements intact, facial strength and sensation intact bilaterally, palate and tongue midline, hearing grossly intact bilaterally.  Motor: 5 out of 5 strength throughout the upper and lower extremities bilaterally. Normal bulk and tone.  Sensation: Intact to light touch and temperature bilaterally.  Decreased vibratory sensation in the distal LEs bilaterally.  DTR: 1+ at the knees and biceps bilaterally.  Coordination: Finger-nose-finger testing intact bilaterally.  Gait: Cautious gait.    Data base:  Notes of the referring physician were reviewed.  Briefly summarized, these discuss that she has had some paresthesias.    Labs ordered in 6/20 were notable for a low thiamine.    EMG:  "Summary: Right lower extremity nerve conduction studies show absent sural sensory response and low amplitude superficial peroneal sensory response. Right upper extremity nerve conduction studies were normal. Right upper and lower extremity needle examination was normal.   Impression: The absent sural response can be normal for age and the low amplitude superficial peroneal sensory response could also be age-related but in the appropriate clinical context may be evidence of a mild sensory peripheral neuropathy. No evidence of lumbosacral,cervical radiculopathies, nor mononeuropathies were present on the EMG. "    Assessment and plan:  The patient is a 74 y.o. female referred for evaluation of paresthesia. I suspect that this issue is related to peripheral " neuropathy, though EMG did not confirm this.  She is to supplement with thiamine.  She is to work with her PCP on issues such as glycemic monitoring.  We will give the patient Neurontin for symptomatic relief. Medication side effects were discussed with the patient.     The etiology of the jerking episode is unclear to me.  As described, it is inconsistent with epilepsy.  Should it recur, we will consider further workup.    We will plan on seeing the patient back in a few weeks.

## 2020-07-26 RX ORDER — GABAPENTIN 300 MG/1
300 CAPSULE ORAL 3 TIMES DAILY
Qty: 90 CAPSULE | Refills: 3 | Status: SHIPPED | OUTPATIENT
Start: 2020-07-26 | End: 2020-08-03

## 2020-07-27 ENCOUNTER — TELEPHONE (OUTPATIENT)
Dept: FAMILY MEDICINE | Facility: CLINIC | Age: 75
End: 2020-07-27

## 2020-07-27 NOTE — TELEPHONE ENCOUNTER
----- Message from Ethan Metcalf sent at 7/27/2020 12:00 PM CDT -----  Contact: patient  Patient would like to come in to get a urine culture done as she is having burning when urinating.  Patient call back number is [phone] or [phone]

## 2020-07-28 ENCOUNTER — OFFICE VISIT (OUTPATIENT)
Dept: FAMILY MEDICINE | Facility: CLINIC | Age: 75
End: 2020-07-28
Payer: MEDICARE

## 2020-07-28 VITALS
HEART RATE: 72 BPM | HEIGHT: 65 IN | TEMPERATURE: 97 F | DIASTOLIC BLOOD PRESSURE: 70 MMHG | WEIGHT: 154.56 LBS | BODY MASS INDEX: 25.75 KG/M2 | SYSTOLIC BLOOD PRESSURE: 120 MMHG

## 2020-07-28 DIAGNOSIS — N39.0 URINARY TRACT INFECTION WITH HEMATURIA, SITE UNSPECIFIED: Primary | ICD-10-CM

## 2020-07-28 DIAGNOSIS — R30.0 DYSURIA: ICD-10-CM

## 2020-07-28 DIAGNOSIS — R31.9 URINARY TRACT INFECTION WITH HEMATURIA, SITE UNSPECIFIED: Primary | ICD-10-CM

## 2020-07-28 LAB
BILIRUB SERPL-MCNC: NEGATIVE MG/DL
BLOOD URINE, POC: ABNORMAL
CLARITY, POC UA: CLEAR
COLOR, POC UA: YELLOW
GLUCOSE UR QL STRIP: NEGATIVE
KETONES UR QL STRIP: NEGATIVE
LEUKOCYTE ESTERASE URINE, POC: ABNORMAL
NITRITE, POC UA: NEGATIVE
PH, POC UA: 7
PROTEIN, POC: ABNORMAL
SPECIFIC GRAVITY, POC UA: 1
UROBILINOGEN, POC UA: NORMAL

## 2020-07-28 PROCEDURE — 87086 URINE CULTURE/COLONY COUNT: CPT

## 2020-07-28 PROCEDURE — 3078F PR MOST RECENT DIASTOLIC BLOOD PRESSURE < 80 MM HG: ICD-10-PCS | Mod: CPTII,S$GLB,, | Performed by: FAMILY MEDICINE

## 2020-07-28 PROCEDURE — 1101F PR PT FALLS ASSESS DOC 0-1 FALLS W/OUT INJ PAST YR: ICD-10-PCS | Mod: CPTII,S$GLB,, | Performed by: FAMILY MEDICINE

## 2020-07-28 PROCEDURE — 99999 PR PBB SHADOW E&M-EST. PATIENT-LVL V: ICD-10-PCS | Mod: PBBFAC,,, | Performed by: FAMILY MEDICINE

## 2020-07-28 PROCEDURE — 3078F DIAST BP <80 MM HG: CPT | Mod: CPTII,S$GLB,, | Performed by: FAMILY MEDICINE

## 2020-07-28 PROCEDURE — 3074F PR MOST RECENT SYSTOLIC BLOOD PRESSURE < 130 MM HG: ICD-10-PCS | Mod: CPTII,S$GLB,, | Performed by: FAMILY MEDICINE

## 2020-07-28 PROCEDURE — 99213 PR OFFICE/OUTPT VISIT, EST, LEVL III, 20-29 MIN: ICD-10-PCS | Mod: S$GLB,,, | Performed by: FAMILY MEDICINE

## 2020-07-28 PROCEDURE — 1159F PR MEDICATION LIST DOCUMENTED IN MEDICAL RECORD: ICD-10-PCS | Mod: S$GLB,,, | Performed by: FAMILY MEDICINE

## 2020-07-28 PROCEDURE — 99999 PR PBB SHADOW E&M-EST. PATIENT-LVL V: CPT | Mod: PBBFAC,,, | Performed by: FAMILY MEDICINE

## 2020-07-28 PROCEDURE — 3074F SYST BP LT 130 MM HG: CPT | Mod: CPTII,S$GLB,, | Performed by: FAMILY MEDICINE

## 2020-07-28 PROCEDURE — 99213 OFFICE O/P EST LOW 20 MIN: CPT | Mod: S$GLB,,, | Performed by: FAMILY MEDICINE

## 2020-07-28 PROCEDURE — 1159F MED LIST DOCD IN RCRD: CPT | Mod: S$GLB,,, | Performed by: FAMILY MEDICINE

## 2020-07-28 PROCEDURE — 1101F PT FALLS ASSESS-DOCD LE1/YR: CPT | Mod: CPTII,S$GLB,, | Performed by: FAMILY MEDICINE

## 2020-07-28 RX ORDER — LANSOPRAZOLE 30 MG/1
30 CAPSULE, DELAYED RELEASE ORAL DAILY
COMMUNITY
End: 2021-05-18 | Stop reason: CLARIF

## 2020-07-28 NOTE — PROGRESS NOTES
"Subjective:       Patient ID: Erica Masterson is a 74 y.o. female.    Chief Complaint: Dysuria    This patient is new to me.  Dr Walters (urology)  Dr Reynoso (opthamology)  Dr Lovett (neurology)  Dr Myers (PCP)  Dr Britton (Gastrology)    Ms Maldonado presents to the clinic today with complaints of "tingling" when she urinates. Patient states she was sent to urology last month for the same symptoms and was unsure if this was a UTI or needed to go back to urology. Patient states this is not a painful urination or frequent urination like previous UTI but describes it as a "tingling" and some mild pain to her right side that she is unsure is kidney pain or hip pain.   Patient also reports recent diagnosis of diabetes and starting metformin. Patient is aware of increased UTI with diabetes.   Patient also reports mild "odor" from herself that she feels is not vaginal but rather rectal. Patient recently had a colonoscopy and endoscopy with no major changes. Patient given some education and reading information on gluten free diet as this may help with this "odor" she thinks she smells.   Plan of care to await urine culture prior to antibiotics and follow up with urology, and attempt dietary changes for "odor". Plan of care discussed with patient, verbalized understanding.     Dysuria   This is a recurrent problem. The current episode started in the past 7 days. The problem occurs every urination. The problem has been unchanged. The quality of the pain is described as aching (tingling). The pain is mild. There has been no fever. Pertinent negatives include no chills, discharge, flank pain, frequency, hematuria, hesitancy, nausea, possible pregnancy, sweats, urgency, vomiting, weight loss, constipation, rash or withholding. Associated symptoms comments: Tingling and flank/hip pain. She has tried nothing for the symptoms.     Review of Systems   Constitutional: Negative for activity change, appetite change, chills, diaphoresis, " fever and weight loss.   HENT: Negative for congestion, ear pain, postnasal drip, sinus pressure, sneezing and sore throat.    Eyes: Negative for pain, discharge, redness and itching.   Respiratory: Negative for apnea, cough, chest tightness, shortness of breath and wheezing.    Cardiovascular: Negative for chest pain and leg swelling.   Gastrointestinal: Negative for abdominal distention, abdominal pain, constipation, diarrhea, nausea and vomiting.   Genitourinary: Positive for dysuria. Negative for difficulty urinating, flank pain, frequency, hematuria, hesitancy and urgency.   Skin: Negative for color change, rash and wound.   Neurological: Negative for dizziness.       Patient Active Problem List   Diagnosis    Diverticulosis    Keratoconjunctivitis, unspecified    Myopathy, unspecified    Hyperlipidemia associated with type 2 diabetes mellitus    Depression    Adenomatous polyp of colon    Multinodular goiter    Chronic low back pain    Degenerative arthritis of knee    Hereditary and idiopathic peripheral neuropathy    Lateral meniscal tear    Hypertension associated with diabetes    PAD (peripheral artery disease)    Anxiety    GERD (gastroesophageal reflux disease)    Cardiovascular event risk, ASCVD 10-year risk 13%, 2010    BMI 26.0-26.9,adult    Abdominal obesity    Hypertensive left ventricular hypertrophy, without heart failure    SI (sacroiliac) joint dysfunction    PAC (premature atrial contraction), frequent on Holter, 34%, over 33,200    Atrial septal aneurysm    Sleep disorder    History of colon polyps    Type 2 diabetes mellitus, without long-term current use of insulin    Anemia    JEFF (acute kidney injury)    Constipation    Iron deficiency anemia       Objective:      Physical Exam  Vitals signs reviewed.   Constitutional:       General: She is not in acute distress.     Appearance: Normal appearance. She is well-developed.   HENT:      Head: Normocephalic.       Nose: Nose normal.   Eyes:      Conjunctiva/sclera: Conjunctivae normal.      Pupils: Pupils are equal, round, and reactive to light.   Neck:      Musculoskeletal: Normal range of motion and neck supple.   Cardiovascular:      Rate and Rhythm: Normal rate and regular rhythm.      Heart sounds: Normal heart sounds.   Pulmonary:      Effort: Pulmonary effort is normal. No respiratory distress.      Breath sounds: Normal breath sounds.   Abdominal:      General: Bowel sounds are normal. There is no distension.      Palpations: Abdomen is soft.      Tenderness: There is no abdominal tenderness. There is right CVA tenderness. There is no left CVA tenderness.   Skin:     General: Skin is warm and dry.      Findings: No rash.   Neurological:      Mental Status: She is alert and oriented to person, place, and time.   Psychiatric:         Behavior: Behavior normal.         Lab Results   Component Value Date    WBC 4.98 06/25/2020    HGB 11.2 (L) 06/25/2020    HCT 36.9 (L) 06/25/2020     06/25/2020    CHOL 267 (H) 06/25/2020    TRIG 74 06/25/2020    HDL 56 06/25/2020    ALT 79 (H) 07/16/2020    AST 40 07/16/2020     07/16/2020    K 3.8 07/16/2020     07/16/2020    CREATININE 1.1 07/16/2020    BUN 13 07/16/2020    CO2 27 07/16/2020    TSH 1.252 06/25/2020    INR 1.3 06/20/2020    GLUF 104 05/22/2004    HGBA1C 6.0 (H) 06/25/2020     The 10-year ASCVD risk score (Laura JENNIFER Jr., et al., 2013) is: 39.7%    Values used to calculate the score:      Age: 74 years      Sex: Female      Is Non- : Yes      Diabetic: Yes      Tobacco smoker: No      Systolic Blood Pressure: 120 mmHg      Is BP treated: Yes      HDL Cholesterol: 56 mg/dL      Total Cholesterol: 267 mg/dL    Assessment:       1. Urinary tract infection with hematuria, site unspecified    2. Dysuria        Plan:       Erica was seen today for dysuria.    Diagnoses and all orders for this visit:    Urinary tract infection with  hematuria, site unspecified  -     Urine culture  -     Urinalysis; Future  -     Ambulatory referral/consult to Urology; Future    Dysuria  -     Ambulatory referral/consult to Urology; Future    Follow up if symptoms worsen or fail to improve.      Urology referral placed, appointment scheduled for 8/14/2020 with Dr Walters

## 2020-07-28 NOTE — PATIENT INSTRUCTIONS
"  Bladder Infection, Female (Adult)    Urine is normally doesn't have any bacteria in it. But bacteria can get into the urinary tract from the skin around the rectum. Or they can travel in the blood from elsewhere in the body. Once they are in your urinary tract, they can cause infection in the urethra (urethritis), the bladder (cystitis), or the kidneys (pyelonephritis).  The most common place for an infection is in the bladder. This is called a bladder infection. This is one of the most common infections in women. Most bladder infections are easily treated. They are not serious unless the infection spreads to the kidney.  The phrases "bladder infection," "UTI," and "cystitis" are often used to describe the same thing. But they are not always the same. Cystitis is an inflammation of the bladder. The most common cause of cystitis is an infection.  Symptoms  The infection causes inflammation in the urethra and bladder. This causes many of the symptoms. The most common symptoms of a bladder infection are:  · Pain or burning when urinating  · Having to urinate more often than usual  · Urgent need to urinate  · Only a small amount of urine comes out  · Blood in urine  · Abdominal discomfort. This is usually in the lower abdomen above the pubic bone.  · Cloudy urine  · Strong- or bad-smelling urine  · Unable to urinate (urinary retention)  · Unable to hold urine in (urinary incontinence)  · Fever  · Loss of appetite  · Confusion (in older adults)  Causes  Bladder infections are not contagious. You can't get one from someone else, from a toilet seat, or from sharing a bath.  The most common cause of bladder infections is bacteria from the bowels. The bacteria get onto the skin around the opening of the urethra. From there, they can get into the urine and travel up to the bladder, causing inflammation and infection. This usually happens because of:  · Wiping improperly after urinating. Always wipe from front to " back.  · Bowel incontinence  · Pregnancy  · Procedures such as having a catheter inserted  · Older age  · Not emptying your bladder. This can allow bacteria a chance to grow in your urine.  · Dehydration  · Constipation  · Sex  · Use of a diaphragm for birth control   Treatment  Bladder infections are diagnosed by a urine test. They are treated with antibiotics and usually clear up quickly without complications. Treatment helps prevent a more serious kidney infection.  Medicines  Medicines can help in the treatment of a bladder infection:  · Take antibiotics until they are used up, even if you feel better. It is important to finish them to make sure the infection has cleared.  · You can use acetaminophen or ibuprofen for pain, fever, or discomfort, unless another medicine was prescribed. If you have chronic liver or kidney disease, talk with your healthcare provider before using these medicines. Also talk with your provider if you've ever had a stomach ulcer or gastrointestinal bleeding, or are taking blood-thinner medicines.  · If you are given phenazopydridine to reduce burning with urination, it will cause your urine to become a bright orange color. This can stain clothing.  Care and prevention  These self-care steps can help prevent future infections:  · Drink plenty of fluids to prevent dehydration and flush out your bladder. Do this unless you must restrict fluids for other health reasons, or your doctor told you not to.  · Proper cleaning after going to the bathroom is important. Wipe from front to back after using the toilet to prevent the spread of bacteria.  · Urinate more often. Don't try to hold urine in for a long time.  · Wear loose-fitting clothes and cotton underwear. Avoid tight-fitting pants.  · Improve your diet and prevent constipation. Eat more fresh fruit and vegetables, and fiber, and less junk and fatty foods.  · Avoid sex until your symptoms are gone.  · Avoid caffeine, alcohol, and spicy  foods. These can irritate your bladder.  · Urinate right after intercourse to flush out your bladder.  · If you use birth control pills and have frequent bladder infections, discuss it with your doctor.  Follow-up care  Call your healthcare provider if all symptoms are not gone after 3 days of treatment. This is especially important if you have repeat infections.  If a culture was done, you will be told if your treatment needs to be changed. If directed, you can call to find out the results.  If X-rays were done, you will be told if the results will affect your treatment.  Call 911  Call 911 if any of the following occur:  · Trouble breathing  · Hard to wake up or confusion  · Fainting or loss of consciousness  · Rapid heart rate  When to seek medical advice  Call your healthcare provider right away if any of these occur:  · Fever of 100.4ºF (38.0ºC) or higher, or as directed by your healthcare provider  · Symptoms are not better by the third day of treatment  · Back or belly (abdominal) pain that gets worse  · Repeated vomiting, or unable to keep medicine down  · Weakness or dizziness  · Vaginal discharge  · Pain, redness, or swelling in the outer vaginal area (labia)  Date Last Reviewed: 10/1/2016  © 4585-1159 [employer]. [address] [place] [address], [place] [id number]. All rights reserved. This information is not intended as a substitute for professional medical care. Always follow your healthcare professional's instructions.        Preventing Kidney Stones  If youve had a kidney stone, you may worry that youll have another. Removing or passing your stone doesnt prevent future stones. But with your healthcare providers help, you can reduce your risk of forming new stones. Follow up with your healthcare provider to help find new stones. You may need follow-up every 3 months to a year for a lifetime.    Drink lots of water  Staying well-hydrated is the best way to reduce your risk of future stones.  Drink 8 12-ounce glasses of water daily. Have 2 with each meal and 2 between meals. Try keeping a pitcher of water nearby during the day and at night.  Take medicines if needed  Medicines, including vitamins and minerals, may be prescribed for certain types of stones. You may want to write your doses and medicine times on a calendar. Some medicines decrease stone-forming chemicals in your blood. Others help prevent those chemicals from crystallizing in urine. Still others help keep a normal acid balance in your urine.  Follow your prescribed diet  Your healthcare provider will tell you which foods contain the chemicals you should avoid. Your healthcare provider may also suggest talking to a dietitian. He or she can help you plan meals youll enjoy. These meals wont put you at risk for future stones. You may be told to limit certain foods, depending on which type of stones youve had. You should limit the amount of salt in your food to about 2 grams a day. This will help prevent most types of kidney stones. Make sure you get an adequate amount of calcium in your diet.  For calcium oxalate stones: Limit animal protein, such as meat, eggs, and fish. Limit grapefruit juice and alcohol. Limit high-oxalate foods (such as cola, tea, chocolate, spinach, rhubarb, wheat bran, and peanuts).  For uric acid stones: Limit high-purine foods, such as mushrooms, peas, beans, anchovies, meat, poultry, shellfish, and organ meats. These foods increase uric acid production.  For cystine stones: Limit high-methionine foods (fish is the most common, but eggs and meats, also). These foods increase production of cystine.  Date Last Reviewed: 2/1/2017  © 8373-0276 [employer]. [address] [place] [address], [place] [address]. All rights reserved. This information is not intended as a substitute for professional medical care. Always follow your healthcare professional's instructions.        Identifying Kidney Stones  There are 4  general types of kidney stones. Your kidney stones size and shape determine whether it is likely to pass by itself. Knowing what a stone is made of (its composition) helps your healthcare provider find its cause. Then he or she can suggest the best treatment. X-rays or scans can help show the stone's size and shape. Your healthcare provider may also give you a strainer. You can use this to catch the stone while passing urine, and the provider can then test the stone. Other urine and blood tests may be done to help identify the stone. These tests can also help identify causes for different types of stones.     Size  A stone may be as small as a grain of sand. Or it may be as large as a golf ball. Small stones may pass out of your body when you urinate.   Shape  Small, smooth, round stones may pass easily. Jagged-edged stones often lodge inside the kidney or ureter. Staghorn stones can fill the entire renal pelvis and calyces.   Composition  Most stones are made of calcium oxalate, a hard compound. Stones made of cystine or uric acid, or caused by infection (struvite stones), are less dense. Stones often contain more than one chemical.      Your kidneys filter your blood and release chemicals into the urine. If certain chemicals build up in the kidneys, they can form a stone.   Treating your stones  You and your healthcare provider will work together to form a treatment plan. Your provider may suggest that you let your stone pass naturally. Or you may manage it with medicines. Certain procedures may also help, such as SWL (shock wave lithotripsy) or using a camera inside the body to remove the stone (ureteroscopy). And you will be told how you can help prevent kidney stones in the future.  Date Last Reviewed: 1/1/2017  © 5937-7004 [employer]. [address] [place] [address], [place] [address]. All rights reserved. This information is not intended as a substitute for professional medical care. Always follow your  "healthcare professional's instructions.        Gluten-Free Diet for Celiac Disease     Reading food labels will help you avoid foods with gluten.   You've been told that you have celiac disease. This means that you are sensitive to a protein called gluten. Gluten is found in certain grains. When you ingest gluten, your immune system causes harm to your small intestines. The treatment for celiac disease is to avoid foods and products that contain gluten. You will need to do this for the rest of your life. Avoid the temptation to "cheat," even a small amount of gluten can cause symptoms to return. And it can harm your body. This sheet gives you the basics about a gluten-free diet. If you need help, a registered dietitian (RD) can teach you what foods and other products have gluten and how to avoid them.  Always read labels!  Many foods may contain gluten, even if you think they don't. Get into the habit of reading ingredient labels before you eat.   Choosing foods  The most common source of gluten is wheat flour (this includes "white" flour). Wheat flour is used to make many baked goods, including breads, pastas, cereals, pastries, and pizza dough. But gluten is also found in many foods that you might not think would have it. You will need to read food labels to look for gluten in everything you eat. But your diet does not need to be boring. Many foods are naturally gluten-free. And many foods commonly made with wheat flour now come in gluten-free forms. But keep in mind that if something is labeled "wheat-free" it may not be also gluten-free.  Foods to Avoid Foods You Can Eat   Bread, cereals, pasta, pastries, couscous, or pizza dough made with wheat flour (this includes white flour, farina, farro, emmer, durum, yara, and semolina) Bread, cereals, pasta, pastries, or pizza dough made with rice flour, almond flour, beans, potatoes, and other gluten-free substitutes   Foods containing rye, barley (including malt), " "spelt, kamut, triticale, funk's yeast, and bulgur Foods containing corn, cassava, rice, amaranth, buckwheat, millet, quinoa, arrowroot, teff, soy, and tapioca   Processed meats Fresh meats and seafood (beef, chicken, turkey, lamb, pork, fish, shellfish), beans, and tofu   Some dairy products with additives Many plain dairy products   Many sauces, gravies, dressings, and condiments, including traditional soy sauce Vinegar, oils, and gluten-free substitutes   Some granola bars and energy bars Gluten-free granola bars and energy bars   Some beers and spirits Wine, and gluten-free beers and distilled spirits   Some soups Gluten-free soups   Fruits and vegetables that are fried or breaded Fresh fruits and vegetables   Many packaged foods Packaged foods labeled "gluten-free"   Oats (check with your healthcare provider) Gluten-free oats   Communion wafers Gluten-free communion wafers   Avoiding accidental exposure to gluten  Staying gluten-free means always being aware. Even if you are very careful, mistakes can happen. The food you eat can't come into contact with gluten. Your meals must be made with utensils that have not touched foods that contain gluten. Shared knives, cutting boards, toasters, and storage containers are risks for gluten exposure. Shared condiments may have crumbs that contain gluten. At restaurants, parties, and other places where you eat food prepared by others, ask how the food was made. Gluten can also be found in some non-food items. Some medications contain gluten. So do some vitamin supplements. Ask your pharmacist before taking a medication or supplement. Also, some shampoos, lotions, toothpastes, makeup, lipstick (lip gloss and lip balm), glues, soaps, and other products contain gluten. It can be possible to ingest some gluten when using these projects. This is called cross-contamination. For example, this can happen if you use a lotion that has gluten and then touch food you eat. Also note " that [employer] and similar products have gluten. Any adult or child with celiac disease should wash their hands after handling these.  Coping with gluten-free living  Living gluten-free can be hard. While there are many gluten-free foods now that you can buy, it is still a big change for many people. You may be upset that you can't eat your favorite foods, eat freely at restaurants, parties, or over the holidays. Household members may also be upset by the strict controls over food. If you face problems like these, think about joining a celiac disease support group. Support groups offer tips on how to make a gluten-free lifestyle easier on you and the people you live with. You can find ways to involve the people in your household. There are many ways to make gluten-free group meals. See More Resources below for help in finding a group.  Bring safe foods that you enjoy to parties and school or work events. This can help you avoid the urge to grab something you shouldnt eat.   Following up with your health care provider  You should see your health care provider at least once a year for a celiac checkup. A simple blood test can show if your celiac disease is under control. If you are having symptoms, your health care provider can help you find sources of gluten you may have missed.  More resources  To learn more about managing celiac disease, try these resources:  · Celiac Disease Foundation  www.celiac.org  · Academy of Nutrition and Dietetics  www.eatright.org  · National Digestive Diseases Information Clearinghouse  www.digestive.niddk.nih.gov  Date Last Reviewed: 6/19/2015 © 2000-2017 [employer]. [address] [place] [address], [place] [address]. All rights reserved. This information is not intended as a substitute for professional medical care. Always follow your healthcare professional's instructions.

## 2020-07-30 ENCOUNTER — TELEPHONE (OUTPATIENT)
Dept: FAMILY MEDICINE | Facility: CLINIC | Age: 75
End: 2020-07-30

## 2020-07-30 LAB
BACTERIA UR CULT: NORMAL
BACTERIA UR CULT: NORMAL

## 2020-07-30 NOTE — TELEPHONE ENCOUNTER
----- Message from Albin Stewart sent at 7/30/2020  9:21 AM CDT -----  Type: Needs Medical Advice    Who Called:  Patient  Best Call Back Number: [phone]  Additional Information: Patient would like to discuss medication as they are allergic to it. Please call to advise. Thanks!

## 2020-07-31 ENCOUNTER — TELEPHONE (OUTPATIENT)
Dept: FAMILY MEDICINE | Facility: CLINIC | Age: 75
End: 2020-07-31

## 2020-07-31 NOTE — TELEPHONE ENCOUNTER
----- Message from Myrna Lund sent at 7/31/2020  9:26 AM CDT -----  Regarding: Lab work  Contact: Patient  Type: Needs Medical Advice    Who Called:  Patient    Best Call Back Number: [phone] or [phone]    Additional Information:   Patient requesting most recent lab work from 6/25 to be mailed to her.  Says the office always does this for her.    Please mail to:  [phone]  [place] [id number]

## 2020-07-31 NOTE — TELEPHONE ENCOUNTER
----- Message from Aravind Calhoun sent at 7/31/2020  9:57 AM CDT -----  Regarding: patient advice  Contact: Erica Masterson  Pt would like a copy of her UA results and her latest blood work     Pt can be reached at [phone]

## 2020-08-03 ENCOUNTER — OFFICE VISIT (OUTPATIENT)
Dept: CARDIOLOGY | Facility: CLINIC | Age: 75
End: 2020-08-03
Payer: MEDICARE

## 2020-08-03 VITALS
HEIGHT: 65 IN | DIASTOLIC BLOOD PRESSURE: 65 MMHG | BODY MASS INDEX: 25.71 KG/M2 | WEIGHT: 154.31 LBS | SYSTOLIC BLOOD PRESSURE: 138 MMHG | OXYGEN SATURATION: 98 % | HEART RATE: 80 BPM

## 2020-08-03 DIAGNOSIS — I11.9 HYPERTENSIVE LEFT VENTRICULAR HYPERTROPHY, WITHOUT HEART FAILURE: ICD-10-CM

## 2020-08-03 DIAGNOSIS — I15.2 HYPERTENSION ASSOCIATED WITH DIABETES: ICD-10-CM

## 2020-08-03 DIAGNOSIS — M79.604 PAIN IN BOTH LOWER EXTREMITIES: Primary | ICD-10-CM

## 2020-08-03 DIAGNOSIS — I25.3 ATRIAL SEPTAL ANEURYSM: ICD-10-CM

## 2020-08-03 DIAGNOSIS — E11.69 HYPERLIPIDEMIA ASSOCIATED WITH TYPE 2 DIABETES MELLITUS: ICD-10-CM

## 2020-08-03 DIAGNOSIS — Z91.89 CARDIOVASCULAR EVENT RISK: ICD-10-CM

## 2020-08-03 DIAGNOSIS — F41.1 GAD (GENERALIZED ANXIETY DISORDER): ICD-10-CM

## 2020-08-03 DIAGNOSIS — E65 ABDOMINAL OBESITY: ICD-10-CM

## 2020-08-03 DIAGNOSIS — D64.9 ANEMIA, UNSPECIFIED TYPE: ICD-10-CM

## 2020-08-03 DIAGNOSIS — E11.59 HYPERTENSION ASSOCIATED WITH DIABETES: ICD-10-CM

## 2020-08-03 DIAGNOSIS — N18.30 STAGE 3 CHRONIC KIDNEY DISEASE: ICD-10-CM

## 2020-08-03 DIAGNOSIS — M79.605 PAIN IN BOTH LOWER EXTREMITIES: Primary | ICD-10-CM

## 2020-08-03 DIAGNOSIS — E11.9 TYPE 2 DIABETES MELLITUS WITHOUT COMPLICATION, WITHOUT LONG-TERM CURRENT USE OF INSULIN: ICD-10-CM

## 2020-08-03 DIAGNOSIS — R74.01 ELEVATED ALT MEASUREMENT: ICD-10-CM

## 2020-08-03 DIAGNOSIS — E78.5 HYPERLIPIDEMIA ASSOCIATED WITH TYPE 2 DIABETES MELLITUS: ICD-10-CM

## 2020-08-03 PROCEDURE — 99215 PR OFFICE/OUTPT VISIT, EST, LEVL V, 40-54 MIN: ICD-10-PCS | Mod: S$GLB,,, | Performed by: INTERNAL MEDICINE

## 2020-08-03 PROCEDURE — 3075F PR MOST RECENT SYSTOLIC BLOOD PRESS GE 130-139MM HG: ICD-10-PCS | Mod: CPTII,S$GLB,, | Performed by: INTERNAL MEDICINE

## 2020-08-03 PROCEDURE — 99999 PR PBB SHADOW E&M-EST. PATIENT-LVL IV: ICD-10-PCS | Mod: PBBFAC,,, | Performed by: INTERNAL MEDICINE

## 2020-08-03 PROCEDURE — 1101F PR PT FALLS ASSESS DOC 0-1 FALLS W/OUT INJ PAST YR: ICD-10-PCS | Mod: CPTII,S$GLB,, | Performed by: INTERNAL MEDICINE

## 2020-08-03 PROCEDURE — 3078F DIAST BP <80 MM HG: CPT | Mod: CPTII,S$GLB,, | Performed by: INTERNAL MEDICINE

## 2020-08-03 PROCEDURE — 1159F PR MEDICATION LIST DOCUMENTED IN MEDICAL RECORD: ICD-10-PCS | Mod: S$GLB,,, | Performed by: INTERNAL MEDICINE

## 2020-08-03 PROCEDURE — 3075F SYST BP GE 130 - 139MM HG: CPT | Mod: CPTII,S$GLB,, | Performed by: INTERNAL MEDICINE

## 2020-08-03 PROCEDURE — 1101F PT FALLS ASSESS-DOCD LE1/YR: CPT | Mod: CPTII,S$GLB,, | Performed by: INTERNAL MEDICINE

## 2020-08-03 PROCEDURE — 1126F PR PAIN SEVERITY QUANTIFIED, NO PAIN PRESENT: ICD-10-PCS | Mod: S$GLB,,, | Performed by: INTERNAL MEDICINE

## 2020-08-03 PROCEDURE — 99999 PR PBB SHADOW E&M-EST. PATIENT-LVL IV: CPT | Mod: PBBFAC,,, | Performed by: INTERNAL MEDICINE

## 2020-08-03 PROCEDURE — 1159F MED LIST DOCD IN RCRD: CPT | Mod: S$GLB,,, | Performed by: INTERNAL MEDICINE

## 2020-08-03 PROCEDURE — 1126F AMNT PAIN NOTED NONE PRSNT: CPT | Mod: S$GLB,,, | Performed by: INTERNAL MEDICINE

## 2020-08-03 PROCEDURE — 99215 OFFICE O/P EST HI 40 MIN: CPT | Mod: S$GLB,,, | Performed by: INTERNAL MEDICINE

## 2020-08-03 PROCEDURE — 3078F PR MOST RECENT DIASTOLIC BLOOD PRESSURE < 80 MM HG: ICD-10-PCS | Mod: CPTII,S$GLB,, | Performed by: INTERNAL MEDICINE

## 2020-08-03 RX ORDER — AMLODIPINE BESYLATE 2.5 MG/1
2.5 TABLET ORAL DAILY
Qty: 30 TABLET | Refills: 11 | Status: SHIPPED | OUTPATIENT
Start: 2020-08-03 | End: 2021-08-16

## 2020-08-03 RX ORDER — SITAGLIPTIN 25 MG/1
25 TABLET, FILM COATED ORAL DAILY
Qty: 90 TABLET | Refills: 3
Start: 2020-08-03 | End: 2020-10-23

## 2020-08-03 RX ORDER — PNV NO.95/FERROUS FUM/FOLIC AC 28MG-0.8MG
100 TABLET ORAL DAILY
COMMUNITY
End: 2021-12-28 | Stop reason: CLARIF

## 2020-08-03 RX ORDER — SITAGLIPTIN 25 MG/1
1 TABLET, FILM COATED ORAL DAILY
COMMUNITY
Start: 2020-08-03 | End: 2020-08-03 | Stop reason: SDUPTHER

## 2020-08-03 NOTE — PROGRESS NOTES
Subjective:    Patient ID:  Erica Masterson is a 74 y.o. female who presents for evaluation of Follow-up (leg pain and tightness)  For ASCVD event risk, hypercholesterolemia, medication review  PCP: Dr. Myers, see biannually, does labs  Orthopedic: Dr. Iverson  Muscle: Dr. Irving  Back: Dr. Perez  Prior cardiologist: Dr. Munguia, last seen 5/2015  GI: Dr. Britton  Pain: Dr. Chiang  Lives with , Joel, non-smoker  [employer]    Health literacy: high  Activities: walking 3 times weekly for 1/2 hour, no problem, Yoga weekly is on hold due to COVID  Nicotine: former, Quit 1963  Alcohol: none  Illicit drugs: none  Cardiac symptoms: none, here for atypical LE tightness and burning started in the toes last year and then progress in 4/2020 to the calfs.  Home BP: yes, home log reviewed 123 to 167 / 68 to 84  Medication compliance: yes  Diet: regular  Caffeine: none 1-2 times weekly with half cup of coffee, half a small of cola every other day, 1 cup of tea daily  Labs: 8/2019 LDL 85.4 on 20 mg of Crestor, Cr. 1.1, eGFR 56.8, 10/2018 normal TSH, CRP.  Lab Results   Component Value Date    TSH 1.252 06/25/2020        Lab Results   Component Value Date    HGBA1C 6.0 (H) 06/25/2020       Lab Results   Component Value Date    WBC 4.98 06/25/2020    HGB 11.2 (L) 06/25/2020    HCT 36.9 (L) 06/25/2020    MCV 96 06/25/2020     06/25/2020       CMP  Sodium   Date Value Ref Range Status   07/16/2020 141 136 - 145 mmol/L Final     Potassium   Date Value Ref Range Status   07/16/2020 3.8 3.5 - 5.1 mmol/L Final     Chloride   Date Value Ref Range Status   07/16/2020 104 95 - 110 mmol/L Final     CO2   Date Value Ref Range Status   07/16/2020 27 23 - 29 mmol/L Final     Glucose   Date Value Ref Range Status   07/16/2020 110 70 - 110 mg/dL Final     BUN, Bld   Date Value Ref Range Status   07/16/2020 13 8 - 23 mg/dL Final     Creatinine   Date Value Ref Range Status   07/16/2020 1.1 0.5 - 1.4 mg/dL Final    06/04/2013 0.9 0.5 - 1.4 mg/dL Final     Calcium   Date Value Ref Range Status   07/16/2020 8.9 8.7 - 10.5 mg/dL Final   06/04/2013 9.7 8.7 - 10.5 mg/dL Final     Total Protein   Date Value Ref Range Status   07/16/2020 6.9 6.0 - 8.4 g/dL Final     Albumin   Date Value Ref Range Status   07/16/2020 3.7 3.5 - 5.2 g/dL Final     Total Bilirubin   Date Value Ref Range Status   07/16/2020 0.5 0.1 - 1.0 mg/dL Final     Comment:     For infants and newborns, interpretation of results should be based  on gestational age, weight and in agreement with clinical  observations.  Premature Infant recommended reference ranges:  Up to 24 hours.............<8.0 mg/dL  Up to 48 hours............<12.0 mg/dL  3-5 days..................<15.0 mg/dL  6-29 days.................<15.0 mg/dL       Alkaline Phosphatase   Date Value Ref Range Status   07/16/2020 48 (L) 55 - 135 U/L Final     AST   Date Value Ref Range Status   07/16/2020 40 10 - 40 U/L Final     ALT   Date Value Ref Range Status   07/16/2020 79 (H) 10 - 44 U/L Final     Anion Gap   Date Value Ref Range Status   07/16/2020 10 8 - 16 mmol/L Final   06/04/2013 9 5 - 15 meq/L Final     eGFR if    Date Value Ref Range Status   07/16/2020 56 (A) >60 mL/min/1.73 m^2 Final     eGFR if non    Date Value Ref Range Status   07/16/2020 49 (A) >60 mL/min/1.73 m^2 Final     Comment:     Calculation used to obtain the estimated glomerular filtration  rate (eGFR) is the CKD-EPI equation.        @labrcntip(troponini)@  No results found for: BNP}   Lab Results   Component Value Date    CHOL 267 (H) 06/25/2020    CHOL 170 08/30/2019    CHOL 162 08/16/2018     Lab Results   Component Value Date    HDL 56 06/25/2020    HDL 76 (H) 08/30/2019    HDL 58 08/16/2018     Lab Results   Component Value Date    LDLCALC 196.2 (H) 06/25/2020    LDLCALC 85.4 08/30/2019    LDLCALC 93.0 08/16/2018     Lab Results   Component Value Date    TRIG 74 06/25/2020    TRIG 43  2019    TRIG 55 2018     Lab Results   Component Value Date    CHOLHDL 21.0 2020    CHOLHDL 44.7 2019    CHOLHDL 35.8 2018        Last Echo: 2019, SE  Last stress test: 2017  Cardiovascular angiogram: none  EC2019 normal, rate 82  Fundoscopic exam: annually, no retinopathy    In 2016:  AAF with negative cardiac history, here for follow up. Denies any CP nor SOB. Active and want to be active, walk daily and stretches without problem. Enjoy playing basketball. ECG from ED was normal in 2016. To ED twice for nausea and undergoing GI evaluation, problem started about 2 months, no inciting factors. Chart reviewed recent labs shows CKD eGFR 47, normal CBC. Last lipid in 2016, .8, non- on 40 mg of atorvastatin. Baseline lipid in  .2.     Follow-up  Associated symptoms include myalgias. Pertinent negatives include no chest pain, coughing, diaphoresis, fever, headaches, joint swelling, nausea, numbness, vertigo or weakness.     Chart reviewed, prior cardiac workup with stress Echo in 2014  exercised for 7.0 minutes on a High Ramp protocol, corresponding to a functional capacity of 12 estimated METS, achieving a peak heart rate of 160 bpm, which is 111% of the age predicted maximum heart rate.     There were no significant electrocardiographic changes throughout the protocol suggesting ischemia.     EKG Conclusions:    1. The EKG portion of this study is negative for ischemia at a high workload, and peak heart rate of 160 bpm (111% of predicted).   2. Exercise capacity is above average.   3. Blood pressure response to exercise was normal (Presenting BP: 94/52 Peak BP: 163/87).   4. No significant arrhythmias were present.   5. There were no symptoms of chest discomfort or significant dyspnea throughout the protocol.   6. The Duke treadmill score was 7 suggesting a low probability for future cardiovascular events.    ECHO CONCLUSIONS     1 - Concentric  "remodeling. with the septum measuring 1.1 cm and the posterior wall measuring 1.0 cm across.     2 - Normal left ventricular systolic function (EF 60-65%).     3 - Normal left ventricular diastolic function.     4 - Normal right ventricular systolic function .     No evidence of stress induced myocardial ischemia.     In 10/2017, no new cardiac problem, some joint pains in the right hip, lower back and right shoulder, helped with PT. Anticipate injection into the right hip. Denies any CP nor SOB. Active, biking twice a day for 30 minutes each, no problem. Labs in 9/2017, essentially normal kidney function and LDL 98.2.    In 11/2018, report recurrent "chest pound" lasting up to 2 hours with associated lightheadedness. First noted about 4 years ago, had 3 ED visits. BP log at home 97 to 166 / 59 to 81, HR 54 to 101. Active with exercise twice a week for an hour along with walking 3 times weekly for an hour without problem. LDL in 8/2018 was 93, targe is < 78. Admit to coffee 2-3 cpd alone with Coke about 3 a week, rare beer. Have lower Lisinopril to 10 mg daily for the past week.  10/21/2018 ED note - "73-year-old woman presents to the emergency department for evaluation of chronic lightheadedness. States she has seen primary care and cardiologist without etiology to her lightheadedness. No syncopal episodes. No chest pain or shortness of breath. No fever. No bloody bowel movements. She is not anemic, she has no evidence of decompensated heart failure. I do not suspect infectious etiology. She is not orthostatic. No arrhythmias. Workup in the emergency department is unremarkable. Patient provided with reassurance. She is to follow up with her PCP/cardiologist for further evaluation. ECG in sinus bradycardia, rate 57, normal.    Chart reviewed, had stress Echo in 12/2017:  exercised for 6.5 minutes on a High Ramp protocol, corresponding to a functional capacity of 10 estimated METS, achieving a peak heart rate of 151 " "bpm, which is 107% of the age predicted maximum heart rate. The patient discontinued exercise   secondary to fatigue.     There were no significant electrocardiographic changes throughout the protocol suggesting ischemia.     EKG Conclusions:    1. The EKG portion of this study is negative for ischemia at a high workload, and peak heart rate of 151 bpm (107% of predicted).   2. Exercise capacity is above average.   3. Blood pressure response to exercise was normal (Presenting BP: 130/62 Peak BP: 152/61).   4. No significant arrhythmias were present.   5. There were no symptoms of chest discomfort or significant dyspnea throughout the protocol.   6. The Duke treadmill score was 7 suggesting a low probability for future cardiovascular events.     Echo CONCLUSIONS     1 - Normal left ventricular systolic function (EF 65-70%).     2 - No wall motion abnormalities.     3 - Normal left ventricular diastolic function.     4 - Normal right ventricular systolic function .     5 - The estimated PA systolic pressure is 21 mmHg.     6 - Atrial septal aneurysm .     7 - Difficult windows, recommend use of contrast for future studies.     No evidence of stress induced myocardial ischemia.     Holter 10/2018 - Symptoms reported: SOB in atrial bigeminy, HR of 83 bpm, and shoulder pain in NSR, rate 60 bpm, no ST changes  Mostly in NSR with frequent PAC, 33,266 (34% of complexes), rare PVC    In 11/2019, here for annual review. No heart worries, feeling "great".     HPI comments: in 8/2020, here with concern of recurrent bilateral leg pains started in the big toes last year and in 4/2020, moved to the calf. Had neurology review with testing muscle and nerve, no abnormalities found. Tried on gabapentin without benefit. Able to do walking and exercise 3 times a week for an hour without any problem. Not circulatory. Got off statin for 6 weeks due to muscle pains and no change except the LDL-C jumped up to 196. Medication list reviewed " and corrected.    Echo 12/20189 - Concentric left ventricular remodeling.  Increased (hyperdynamic) left ventricular systolic function. The estimated ejection fraction is 70%  Normal LV diastolic function.  Normal right ventricular systolic function.  There is mild leaflet calcification of the Mitral Valve.  Normal central venous pressure (3 mm Hg).  The estimated PA systolic pressure is 20 mm Hg  Since previous echo of 12/22/2017, there is no significant change. Atrial septal aneurysm is still noted.      Review of Systems   Constitution: Positive for weight loss (down 18 lbs since 11/2019 from reduced intake.). Negative for diaphoresis, fever, malaise/fatigue, night sweats and weight gain.   HENT: Negative for nosebleeds and tinnitus.    Eyes: Negative for visual disturbance.   Cardiovascular: Negative for chest pain, claudication, cyanosis, dyspnea on exertion, irregular heartbeat, leg swelling, near-syncope, orthopnea, palpitations and paroxysmal nocturnal dyspnea.   Respiratory: Negative for cough, shortness of breath, sleep disturbances due to breathing, snoring and wheezing.         [place] score 9 today an 8   Endocrine: Negative for polydipsia and polyuria.   Hematologic/Lymphatic: Positive for adenopathy. Does not bruise/bleed easily.   Skin: Positive for dry skin. Negative for color change, flushing, nail changes, poor wound healing and suspicious lesions.   Musculoskeletal: Positive for arthritis, myalgias and stiffness. Negative for falls, gout, joint swelling, muscle cramps and muscle weakness. Joint pain: right shoulder.   Gastrointestinal: Positive for hemorrhoids. Negative for heartburn, hematemesis, hematochezia, melena and nausea.   Genitourinary: Positive for nocturia (1-2).   Neurological: Positive for difficulty with concentration and disturbances in coordination. Negative for excessive daytime sleepiness, dizziness, focal weakness, headaches, light-headedness, loss of balance, numbness,  "vertigo and weakness.   Psychiatric/Behavioral: Negative for depression and substance abuse. The patient has insomnia and is nervous/anxious.    Allergic/Immunologic: Positive for environmental allergies.        Objective:    Physical Exam   Constitutional: She is oriented to person, place, and time. She appears well-developed and well-nourished.   HENT:   Head: Normocephalic.   Eyes: Pupils are equal, round, and reactive to light. Conjunctivae and EOM are normal.   Neck: Normal range of motion. Neck supple. No JVD present. No thyromegaly present.   Circumference 15.25" down to 13"   Cardiovascular: Normal rate, normal heart sounds and intact distal pulses. An irregular rhythm present. Exam reveals no gallop and no friction rub.   No murmur heard.  Pulses:       Carotid pulses are 2+ on the right side and 2+ on the left side.       Radial pulses are 2+ on the right side and 2+ on the left side.        Femoral pulses are 2+ on the right side and 2+ on the left side.       Popliteal pulses are 2+ on the right side and 2+ on the left side.        Dorsalis pedis pulses are 2+ on the right side and 2+ on the left side.        Posterior tibial pulses are 2+ on the right side and 2+ on the left side.   Pulmonary/Chest: Effort normal and breath sounds normal. She has no rales. She exhibits no tenderness.   Abdominal: Soft. Bowel sounds are normal. There is no abdominal tenderness.   Waist 36.5", hip 40.5"   Musculoskeletal: Normal range of motion.         General: No edema.   Lymphadenopathy:     She has no cervical adenopathy.   Neurological: She is alert and oriented to person, place, and time.   Skin: Skin is warm and dry. No rash noted.         Assessment:       1. Pain in both lower extremities    2. RONNIE (generalized anxiety disorder), 2019    3. Abdominal obesity    4. Atrial septal aneurysm    5. Cardiovascular event risk, ASCVD 10-year risk 13%, 2010    6. Hyperlipidemia associated with type 2 diabetes mellitus  "   7. Hypertension associated with diabetes    8. Hypertensive left ventricular hypertrophy, without heart failure    9. Type 2 diabetes mellitus without complication, without long-term current use of insulin    10. Anemia, unspecified type    11. Elevated ALT measurement    12. Stage 3 chronic kidney disease         Plan:       Erica was seen today for annual exam.    Diagnoses and all orders for this visit:    Pain in both lower extremities    RONNIE (generalized anxiety disorder), 2019    Abdominal obesity  -     US Abdomen Limited; Future; Expected date: 08/03/2020    Atrial septal aneurysm    Cardiovascular event risk, ASCVD 10-year risk 13%, 2010    Hyperlipidemia associated with type 2 diabetes mellitus    Hypertension associated with diabetes  -     amLODIPine (NORVASC) 2.5 MG tablet; Take 1 tablet (2.5 mg total) by mouth once daily.  Dispense: 30 tablet; Refill: 11    Hypertensive left ventricular hypertrophy, without heart failure    Type 2 diabetes mellitus without complication, without long-term current use of insulin  -     JANUVIA 25 mg Tab; Take 1 tablet (25 mg total) by mouth once daily.  Dispense: 90 tablet; Refill: 3    Anemia, unspecified type    Elevated ALT measurement  -     US Abdomen Limited; Future; Expected date: 08/03/2020    Stage 3 chronic kidney disease  -     US Abdomen Limited; Future; Expected date: 08/03/2020    - All medical issues reviewed, continue current Rx, medication list corrected. ASA is consider optional now for primary prevention.  - Can take hypertensive medications at bedtime.  - CV status stable, all medications reviewed, patient acknowledge good understanding.  - Instruction for Mediterranean diet and heart healthy exercise given.  - Recommend at least annual cardiovascular evaluation in view of her significant risk factors.  - Check home blood pressure, 2 days weekly, do 2 readings within 5 minutes in AM and PM, keep log for review.  - Sleep disorder best treated with  CBT, cognitive behavior therapy  - Have to do some abdominal exercise to rid belly fat  - Highly recommend 30 minutes of exercise daily, can have Sunday off, with 2-3 sessions of muscle strengthening weekly. A  would be very helpful.  - Need good exercise program, 4 key elements: 1. Aerobic (walking, swimming, dancing, jogging, biking, etc, 2. Muscle strengthening / resistance exercise, need to do 2-3 times weekly, 3. Stretching daily, good stretch takes a whole  total minute. 4. Balance exercise daily.  - Phone review / encourage use of [facility], no [facility]  - The [employer] support at [facility] is available 5 days a week, from 9 to 5, at [phone].       Patient Active Problem List   Diagnosis    Diverticulosis    Keratoconjunctivitis, unspecified    Myopathy, unspecified    Hyperlipidemia associated with type 2 diabetes mellitus, baseline     Depression    Adenomatous polyp of colon    Multinodular goiter    Chronic low back pain    Degenerative arthritis of knee    Hereditary and idiopathic peripheral neuropathy    Lateral meniscal tear    Hypertension associated with diabetes    PAD (peripheral artery disease)    Anxiety    GERD (gastroesophageal reflux disease)    Cardiovascular event risk, ASCVD 10-year risk 13%, 2010    BMI 26.0-26.9,adult    Abdominal obesity    Hypertensive left ventricular hypertrophy, without heart failure    SI (sacroiliac) joint dysfunction    PAC (premature atrial contraction), frequent on Holter, 34%, over 33,200    Atrial septal aneurysm    Sleep disorder    History of colon polyps    Type 2 diabetes mellitus, without long-term current use of insulin, dx 3/2020    Anemia    JEFF (acute kidney injury)    Constipation    Iron deficiency anemia    RONNIE (generalized anxiety disorder), 2019    Elevated ALT measurement    Stage 3 chronic kidney disease     Greater than 50% of the time was spent in counseling and coordination of care.  The above assessment and plan have been discussed at length. Labs and procedure over the last 6 months reviewed. Problem List updated. Asked to bring in all active medications / pills bottles with next visit.

## 2020-08-03 NOTE — PROGRESS NOTES
" Patient ID:  Erica Masterson is a 74 y.o. female who presents for {Misc; evaluation/follow-up:95703::"follow-up"} of Follow-up (leg pain and tightness)      Health literacy: {DESC; LOW/MEDIUM/HIGH:84617} High  Activities: walk  Nicotine: quit   Alcohol: none  Illicit drugs: none  Cardiac symptoms: tightness in legs  Home BP: yes  Medication compliance: yes  Diet: regular, diabetic, Mediterranean regular  Caffeine: 1 cpd  Labs:   Last Echo: 2019  Last stress test: 2017  Cardiovascular angiogram:   EC2019  Fundoscopic exam:     No flowsheet data found.      HPI    ROS     Objective:    Physical Exam      Assessment:       No diagnosis found.     Plan:         There are no diagnoses linked to this encounter.              "

## 2020-08-04 ENCOUNTER — TELEPHONE (OUTPATIENT)
Dept: OPTOMETRY | Facility: CLINIC | Age: 75
End: 2020-08-04

## 2020-08-04 NOTE — TELEPHONE ENCOUNTER
----- Message from Shruti Cortez sent at 8/4/2020  8:41 AM CDT -----  Regarding: FW: appointment access  Contact: patient    ----- Message -----  From: Radha Shearer  Sent: 8/4/2020   8:09 AM CDT  To: [facility] Staff  Subject: appointment access                               Type:  Sooner Apoointment Request    Caller is requesting a sooner appointment.  Caller declined first available appointment listed below.  Caller will not accept being placed on the waitlist and is requesting a message be sent to doctor.    Name of Caller: patient  When is the first available appointment?  10/19/20  Symptoms:  eyelids are not wanting to open properly after sleeping and headaches  Best Call Back Number:  [phone] or [phone] (home)   Additional Information:  Patient states the problem is getting worse since she seen Dr Trinidad on 07/16/20. Please call patient. Thanks!

## 2020-08-05 NOTE — TELEPHONE ENCOUNTER
Okay to discontinue metformin and resume Januvia, does she have some left or does she need a new prescription sent in?

## 2020-08-10 ENCOUNTER — OFFICE VISIT (OUTPATIENT)
Dept: OPHTHALMOLOGY | Facility: CLINIC | Age: 75
End: 2020-08-10
Payer: MEDICARE

## 2020-08-10 DIAGNOSIS — H40.013 OPEN ANGLE WITH BORDERLINE FINDINGS AND LOW GLAUCOMA RISK IN BOTH EYES: Primary | ICD-10-CM

## 2020-08-10 DIAGNOSIS — H25.13 NUCLEAR SCLEROSIS, BILATERAL: ICD-10-CM

## 2020-08-10 DIAGNOSIS — H04.123 DRY EYES, BILATERAL: ICD-10-CM

## 2020-08-10 PROCEDURE — 99999 PR PBB SHADOW E&M-EST. PATIENT-LVL III: CPT | Mod: PBBFAC,,, | Performed by: OPHTHALMOLOGY

## 2020-08-10 PROCEDURE — 68761 PR CLOSE TEAR DUCT OPENING BY PLUG,EA: ICD-10-PCS | Mod: 50,S$GLB,, | Performed by: OPHTHALMOLOGY

## 2020-08-10 PROCEDURE — 92012 PR EYE EXAM, EST PATIENT,INTERMED: ICD-10-PCS | Mod: 25,S$GLB,, | Performed by: OPHTHALMOLOGY

## 2020-08-10 PROCEDURE — 92012 INTRM OPH EXAM EST PATIENT: CPT | Mod: 25,S$GLB,, | Performed by: OPHTHALMOLOGY

## 2020-08-10 PROCEDURE — 99999 PR PBB SHADOW E&M-EST. PATIENT-LVL III: ICD-10-PCS | Mod: PBBFAC,,, | Performed by: OPHTHALMOLOGY

## 2020-08-10 PROCEDURE — 68761 CLOSE TEAR DUCT OPENING: CPT | Mod: 50,S$GLB,, | Performed by: OPHTHALMOLOGY

## 2020-08-10 RX ORDER — SUCRALFATE 1 G/1
TABLET ORAL
COMMUNITY
Start: 2020-08-06 | End: 2021-03-05

## 2020-08-10 RX ORDER — CYCLOSPORINE 0.5 MG/ML
1 EMULSION OPHTHALMIC 2 TIMES DAILY
Qty: 60 VIAL | Refills: 11 | Status: SHIPPED | OUTPATIENT
Start: 2020-08-10 | End: 2021-10-18

## 2020-08-10 NOTE — PROGRESS NOTES
HPI     DLE- 6/15/20     Pt is here for f/u dry eyes. Pt explains that's she is still having a lot   of eye irritation. She is currently using otc tears 1-2x in OU and gel   tears at night before bed. No compresses.     Last edited by Neil Bernstein on 8/10/2020  3:51 PM. (History)        ROS     Negative for: Constitutional, Gastrointestinal, Neurological, Skin,   Genitourinary, Musculoskeletal, HENT, Endocrine, Cardiovascular, Eyes,   Respiratory, Psychiatric, Allergic/Imm, Heme/Lymph    Last edited by Celso Reynoso Jr., MD on 8/10/2020  4:59 PM. (History)        Assessment /Plan     For exam results, see Encounter Report.    Open angle with borderline findings and low glaucoma risk in both eyes    Nuclear sclerosis, bilateral    Dry eyes, bilateral  -     cycloSPORINE (RESTASIS) 0.05 % ophthalmic emulsion; Place 1 drop into both eyes 2 (two) times daily.  Dispense: 60 vial; Refill: 11      -  Warm compresses to eyelids along with eyelid massages at least twice daily OU  -  Preservative free AT's 4-6x daliy OU; counseled patient on different brands that are available at pharmacies  -  Avoid Visine and Clear Eyes if they are not the preservative free brand  -  Discussed Gels and PM Ointment options  -  Counseled on external factors that can worsen symptom such as air vents and ceiling fans if they are blowing directly on patient for extended amounts of time  -  Counseled on taking more breaks when using the computer and reading    Procedure note:   Date: 08/10/2020  Time: 4:59 PM   Name of procedure being done: Placement of bilateral lower punctal plugs   Indications: dry eyes  Patient consent:   Indications, risks and alternatives to the procedure were explained to the patient. The patient was given the opportunity to ask questions and consented to the procedure in writing.   Pertinent lab values: Schirmer's test done prior visit  Type of anesthesia: 0.5% proparacaine.   Description of procedure: Vigamox pre-  and post- .5 mm plugs placed   Complication: none   EBL: none.   Disposition: stable     Follow up in about 4 months (around 12/10/2020) for f/u dry eyes pjug replacement.

## 2020-08-10 NOTE — PATIENT INSTRUCTIONS
Restasis 1 drop 2 x daily (1 vial 2x daily ) both eyes  Systane (preservative free) 1 drop 4 x daily both eyes  genteal ointment at bedtime both eyes

## 2020-08-14 ENCOUNTER — OFFICE VISIT (OUTPATIENT)
Dept: UROLOGY | Facility: CLINIC | Age: 75
End: 2020-08-14
Payer: MEDICARE

## 2020-08-14 VITALS
WEIGHT: 154.31 LBS | HEART RATE: 90 BPM | BODY MASS INDEX: 25.71 KG/M2 | HEIGHT: 65 IN | SYSTOLIC BLOOD PRESSURE: 142 MMHG | DIASTOLIC BLOOD PRESSURE: 69 MMHG | RESPIRATION RATE: 18 BRPM | TEMPERATURE: 98 F

## 2020-08-14 DIAGNOSIS — R31.9 URINARY TRACT INFECTION WITH HEMATURIA, SITE UNSPECIFIED: Primary | ICD-10-CM

## 2020-08-14 DIAGNOSIS — R30.0 DYSURIA: ICD-10-CM

## 2020-08-14 DIAGNOSIS — R31.29 MICROSCOPIC HEMATURIA: ICD-10-CM

## 2020-08-14 DIAGNOSIS — N39.0 URINARY TRACT INFECTION WITH HEMATURIA, SITE UNSPECIFIED: Primary | ICD-10-CM

## 2020-08-14 PROCEDURE — 99214 PR OFFICE/OUTPT VISIT, EST, LEVL IV, 30-39 MIN: ICD-10-PCS | Mod: S$GLB,,, | Performed by: UROLOGY

## 2020-08-14 PROCEDURE — 3078F DIAST BP <80 MM HG: CPT | Mod: CPTII,S$GLB,, | Performed by: UROLOGY

## 2020-08-14 PROCEDURE — 1159F MED LIST DOCD IN RCRD: CPT | Mod: S$GLB,,, | Performed by: UROLOGY

## 2020-08-14 PROCEDURE — 3077F PR MOST RECENT SYSTOLIC BLOOD PRESSURE >= 140 MM HG: ICD-10-PCS | Mod: CPTII,S$GLB,, | Performed by: UROLOGY

## 2020-08-14 PROCEDURE — 1159F PR MEDICATION LIST DOCUMENTED IN MEDICAL RECORD: ICD-10-PCS | Mod: S$GLB,,, | Performed by: UROLOGY

## 2020-08-14 PROCEDURE — 3078F PR MOST RECENT DIASTOLIC BLOOD PRESSURE < 80 MM HG: ICD-10-PCS | Mod: CPTII,S$GLB,, | Performed by: UROLOGY

## 2020-08-14 PROCEDURE — 1101F PT FALLS ASSESS-DOCD LE1/YR: CPT | Mod: CPTII,S$GLB,, | Performed by: UROLOGY

## 2020-08-14 PROCEDURE — 1126F PR PAIN SEVERITY QUANTIFIED, NO PAIN PRESENT: ICD-10-PCS | Mod: S$GLB,,, | Performed by: UROLOGY

## 2020-08-14 PROCEDURE — 99214 OFFICE O/P EST MOD 30 MIN: CPT | Mod: S$GLB,,, | Performed by: UROLOGY

## 2020-08-14 PROCEDURE — 99999 PR PBB SHADOW E&M-EST. PATIENT-LVL V: CPT | Mod: PBBFAC,,, | Performed by: UROLOGY

## 2020-08-14 PROCEDURE — 1126F AMNT PAIN NOTED NONE PRSNT: CPT | Mod: S$GLB,,, | Performed by: UROLOGY

## 2020-08-14 PROCEDURE — 99999 PR PBB SHADOW E&M-EST. PATIENT-LVL V: ICD-10-PCS | Mod: PBBFAC,,, | Performed by: UROLOGY

## 2020-08-14 PROCEDURE — 3077F SYST BP >= 140 MM HG: CPT | Mod: CPTII,S$GLB,, | Performed by: UROLOGY

## 2020-08-14 PROCEDURE — 1101F PR PT FALLS ASSESS DOC 0-1 FALLS W/OUT INJ PAST YR: ICD-10-PCS | Mod: CPTII,S$GLB,, | Performed by: UROLOGY

## 2020-08-14 NOTE — PATIENT INSTRUCTIONS
Cystoscopy    Cystoscopy is a procedure that lets your doctor look directly inside your urethra and bladder. It can be used to:  · Help diagnose a problem with your urethra, bladder, or kidneys.  · Take a sample (biopsy) of bladder or urethral tissue.  · Treat certain problems (such as removing kidney stones).  · Place a stent to bypass an obstruction.  · Take special X-rays of the kidneys.  Based on the findings, your doctor may recommend other tests or treatments.  What is a cystoscope?  A cystoscope is a telescope-like instrument that contains lenses and fiberoptics (small glass wires that make bright light). The cystoscope may be straight and rigid, or flexible to bend around curves in the urethra. The doctor may look directly into the cystoscope, or project the image onto a monitor.  Getting ready  · Ask your doctor if you should stop taking any medicines before the procedure.  · Ask whether you should avoid eating or drinking anything after midnight before the procedure.  · Follow any other instructions your doctor gives you.  Tell your doctor before the exam if you:  · Take any medicines, such as aspirin or blood thinners  · Have allergies to any medicines  · Are pregnant   The procedure  Cystoscopy is done in the doctors office, surgery center, or hospital. The doctor and a nurse are present during the procedure. It takes only a few minutes, longer if a biopsy, X-ray, or treatment needs to be done.  During the procedure:  · You lie on an exam table on your back, knees bent and legs apart. You are covered with a drape.  · Your urethra and the area around it are washed. Anesthetic jelly may be applied to numb the urethra. Other pain medicine is usually not needed. In some cases, you may be offered a mild sedative to help you relax. If a more extensive procedure is to be done, such as a biopsy or kidney stone removal, general anesthesia may be needed.  · The cystoscope is inserted. A sterile fluid is put  into the bladder to expand it. You may feel pressure from this fluid.  · When the procedure is done, the cystoscope is removed.  After the procedure  If you had a sedative, general anesthesia, or spinal anesthesia, you must have someone drive you home. Once youre home:  · Drink plenty of fluids.  · You may have burning or light bleeding when you urinate--this is normal.  · Medicines may be prescribed to ease any discomfort or prevent infection. Take these as directed.  · Call your doctor if you have heavy bleeding or blood clots, burning that lasts more than a day, a fever over 100°F  (38° C), or trouble urinating.  Date Last Reviewed: 1/1/2017  © 0911-6102 [employer], Lee [employer]. [address] [place] [address], [place] [address]. All rights reserved. This information is not intended as a substitute for professional medical care. Always follow your healthcare professional's instructions.

## 2020-08-14 NOTE — LETTER
August 14, 2020      Karen Soto, NP  [address] [facility]  [place] [address]           [place] [facility]  [address] [place] [address] [address]  [place] [address]  Phone: [phone]  Fax: [phone]          Patient: Erica Masterson   MR Number: [id number]   YOB: 1945   Date of Visit: 8/14/2020       Dear Karen Soto:    Thank you for referring Erica Masterson to me for evaluation. Attached you will find relevant portions of my assessment and plan of care.    If you have questions, please do not hesitate to call me. I look forward to following Erica Masterson along with you.    Sincerely,    Charlotte Walters Jr., MD    Enclosure  CC:  No Recipients    If you would like to receive this communication electronically, please contact [email or web address] or [phone] to request more information on [employer] Link access.    For providers and/or their staff who would like to refer a patient to [facility], please contact us through our one-stop-shop provider referral line, [facility], at [phone].    If you feel you have received this communication in error or would no longer like to receive these types of communications, please e-mail [email or web address]

## 2020-08-14 NOTE — H&P (VIEW-ONLY)
[facility] Established Patient/H&P:    Erica Masterson is a 74 y.o. female who presents for follow up for right hydronephrosis and recent urinary tract infection.     Patient recently evaluated by Ellyn Jean-Baptiste for right hydronephrosis.      She underwent CT abd/pelvis with contrast on 6/9/20 for epigastric pain and was incidentally found to have mild right hydronephrosis and right phleboliths.      US renal on 6/14/20 was negative for any hydronephrosis.     She continues to have mild right lower quadrant abdominal pain.   Of note, her most recent UA micro showed microscopic hematuria.       Interval History    8/14/20: Patient states that she recently had an episode of dysuria and was told she had a UTI. She states that her dysuria has since resolved. Last urine culture with multiple orgs suggesting contamination.      She denies any fever, chills, bone pain, weight loss,  trauma or history of  malignancy.         Urine culture  Multiple orgs 7/28/20  No growth        6/11/20  No growth        6/4/20  No growth        5/15/20  No growth        4/30/20        Urine cytology  Negative          6/11/20    Past Medical History:   Diagnosis Date    Allergy     Anxiety     Cataract     Colon polyp     Degenerative arthritis of knee 4/3/2012    Diverticulosis     GERD (gastroesophageal reflux disease)     Hyperlipidemia     Hypertension     Multinodular goiter 3/26/2012    Myopathy, unspecified 1/18/2010    New onset type 2 diabetes mellitus 3/24/2020    New onset type 2 diabetes mellitus 3/24/2020    Tuberculosis        Past Surgical History:   Procedure Laterality Date    BREAST BIOPSY Left 2015    benign    COLONOSCOPY  12/2/15    Dr. Britton, multiple polyps, recheck five years-three years if more than 2 polyps are adenomatous    COLONOSCOPY N/A 12/2/2015    COLONOSCOPY N/A 3/20/2019    Procedure: COLONOSCOPY;  Surgeon: Jose Britton MD;  Location: [facility];  Service:  Endoscopy;  Laterality: N/A;    COLONOSCOPY N/A 5/20/2020    Dr. Britton; internal hemorrhoids; diverticulosis; polyps removed; repeat in 3 years    ESOPHAGOGASTRODUODENOSCOPY N/A 5/20/2020    Dr. Britton; small hiatal hernia; gastritis; 8 gastric polyps removed    FOOT SURGERY      HYSTERECTOMY      INTRALUMINAL GASTROINTESTINAL TRACT IMAGING VIA CAPSULE N/A 6/4/2020    Procedure: IMAGING PROCEDURE, GI TRACT, INTRALUMINAL, VIA CAPSULE;  Surgeon: Jose Britton MD;  Location: [facility];  Service: Endoscopy;  Laterality: N/A;    KNEE SURGERY  06/06/2013    right knee tear Dr Iverson     LUNG LOBECTOMY  1966    right middle lobectomy, due to Tb    OOPHORECTOMY      SHOULDER SURGERY      francis        Family History   Problem Relation Age of Onset    Colon cancer Other     Cancer Mother     Hypertension Mother     Hyperlipidemia Mother     Cancer Brother     Hypertension Father     Emphysema Father     Diabetes Son     Hypertension Son     Alcohol abuse Son     Diabetes Maternal Aunt     Alzheimer's disease Maternal Uncle     Cancer Maternal Grandmother     No Known Problems Daughter     Dementia Sister     Alzheimer's disease Sister     Breast cancer Sister 60    Obesity Paternal Uncle     Prostate cancer Other     Melanoma Neg Hx     Psoriasis Neg Hx     Lupus Neg Hx     Eczema Neg Hx     Amblyopia Neg Hx     Blindness Neg Hx     Cataracts Neg Hx     Glaucoma Neg Hx     Macular degeneration Neg Hx     Retinal detachment Neg Hx     Strabismus Neg Hx     Stroke Neg Hx     Thyroid disease Neg Hx        Social History     Socioeconomic History    Marital status:      Spouse name: Not on file    Number of children: Not on file    Years of education: Not on file    Highest education level: Not on file   Occupational History    Not on file   Social Needs    Financial resource strain: Not on file    Food insecurity     Worry: Not on file     Inability: Not on file     Transportation needs     Medical: Not on file     Non-medical: Not on file   Tobacco Use    Smoking status: Never Smoker    Smokeless tobacco: Never Used   Substance and Sexual Activity    Alcohol use: Not Currently     Comment: stopped 2019    Drug use: No    Sexual activity: Not Currently   Lifestyle    Physical activity     Days per week: Not on file     Minutes per session: Not on file    Stress: Not on file   Relationships    Social connections     Talks on phone: Not on file     Gets together: Not on file     Attends Rastafarian service: Not on file     Active member of club or organization: Not on file     Attends meetings of clubs or organizations: Not on file     Relationship status: Not on file   Other Topics Concern    Are you pregnant or think you may be? Not Asked    Breast-feeding Not Asked   Social History Narrative    Not on file       Review of patient's allergies indicates:   Allergen Reactions    No known drug allergies        Medications Reviewed: see MAR    ROS:    Constitutional: denies fevers, chills, night sweats, fatigue, malaise  Respiratory: negative for cough, shortness of breath, wheezing, dyspnea.  Cardiovascular: + for high blood pressure, negative for chest pain, varicose veins, ankle swelling, palpitations, syncope.  GI: negative for abdominal pain, heartburn, indigestion, nausea, vomiting, constipation, diarrhea, blood in stool.   Urology: as noted above in HPI  Endocrinology: negative for cold intolerance, excessive thirst, not feeling tired/sluggish, no heat intolerance.   Hematology/Lymph: negative for easy bleeding, easy bruising, swollen glands.  Musculoskeletal: negative for back pain, joint pain, joint swelling, neck pain.  Allergy-Immunology: negative for seasonal allergies, negative for unusual infections.   Skin: negative for boils, breast lumps, hives, itching, rash.   Neurology: negative for, dizziness, headache, tingling/numbness, tremors.   Psych: satisfied  "with life; negative for, anxiety, depression, suicidal thoughts.     PHYSICAL EXAM:    Vitals:    08/14/20 1348   BP: (!) 142/69   Pulse: 90   Resp: 18   Temp: 98 °F (36.7 °C)     Body mass index is 25.68 kg/m². Weight: 70 kg (154 lb 5.2 oz) Height: 5' 5" (165.1 cm)       General: Alert, cooperative, no distress, appears stated age  Head: Normocephalic, without obvious abnormality, atraumatic  Neck: no masses, no thyromegaly, no lymphadenopathy  Eyes: PERRL, conjunctiva/corneas clear  Lungs: Respirations unlabored, normal effort, no accessory muscle use  CV: Warm and well perfused extremities  Abdomen: Soft, non-tender, no CVA tenderness, no hepatosplenomegaly, no hernia  Extremities: Extremities normal, atraumatic, no cyanosis or edema  Skin: Normal color, texture, and turgor, no rashes or lesions  Psych: Appropriate, well oriented, normal affect, normal mood  Neuro: Non-focal      LABS:    No results found for this or any previous visit (from the past 336 hour(s)).      IMAGING:    Reviewed      Assessment/Diagnosis:    1. Urinary tract infection with hematuria, site unspecified  Ambulatory referral/consult to Urology   2. Dysuria  Ambulatory referral/consult to Urology   3. Microscopic hematuria         Plans:    - We discussed the etiology and management of the patient's microscopic hematuria. Explained that hematuria is multi-factorial and can be secondary to  cancer, obstructive uropathy, nephritis,  trauma,  infections, thrombosis, nephrolithiasis, bleeding disorders and medications. We discussed hematuria work-up and the benefit of further evaluation with cystourethroscopy to rule out any malignancy, stones or other pathology. All risks, benefits and alternatives discussed at length and all questions answered.    - Scheduled for cystoscopy on 9/2/20 at the [facility] with local. Needs COVID.   - No further work-up of her UTI. Explained to patient that she has does not have frequent infections and an " occasional infection in a woman is not abnormal.

## 2020-08-14 NOTE — PROGRESS NOTES
[facility] Established Patient/H&P:    Erica Masterson is a 74 y.o. female who presents for follow up for right hydronephrosis and recent urinary tract infection.     Patient recently evaluated by Ellyn Jean-Baptiste for right hydronephrosis.      She underwent CT abd/pelvis with contrast on 6/9/20 for epigastric pain and was incidentally found to have mild right hydronephrosis and right phleboliths.      US renal on 6/14/20 was negative for any hydronephrosis.     She continues to have mild right lower quadrant abdominal pain.   Of note, her most recent UA micro showed microscopic hematuria.       Interval History    8/14/20: Patient states that she recently had an episode of dysuria and was told she had a UTI. She states that her dysuria has since resolved. Last urine culture with multiple orgs suggesting contamination.      She denies any fever, chills, bone pain, weight loss,  trauma or history of  malignancy.         Urine culture  Multiple orgs 7/28/20  No growth        6/11/20  No growth        6/4/20  No growth        5/15/20  No growth        4/30/20        Urine cytology  Negative          6/11/20    Past Medical History:   Diagnosis Date    Allergy     Anxiety     Cataract     Colon polyp     Degenerative arthritis of knee 4/3/2012    Diverticulosis     GERD (gastroesophageal reflux disease)     Hyperlipidemia     Hypertension     Multinodular goiter 3/26/2012    Myopathy, unspecified 1/18/2010    New onset type 2 diabetes mellitus 3/24/2020    New onset type 2 diabetes mellitus 3/24/2020    Tuberculosis        Past Surgical History:   Procedure Laterality Date    BREAST BIOPSY Left 2015    benign    COLONOSCOPY  12/2/15    Dr. Britton, multiple polyps, recheck five years-three years if more than 2 polyps are adenomatous    COLONOSCOPY N/A 12/2/2015    COLONOSCOPY N/A 3/20/2019    Procedure: COLONOSCOPY;  Surgeon: Jose Britton MD;  Location: [facility];  Service:  Endoscopy;  Laterality: N/A;    COLONOSCOPY N/A 5/20/2020    Dr. Britton; internal hemorrhoids; diverticulosis; polyps removed; repeat in 3 years    ESOPHAGOGASTRODUODENOSCOPY N/A 5/20/2020    Dr. Britton; small hiatal hernia; gastritis; 8 gastric polyps removed    FOOT SURGERY      HYSTERECTOMY      INTRALUMINAL GASTROINTESTINAL TRACT IMAGING VIA CAPSULE N/A 6/4/2020    Procedure: IMAGING PROCEDURE, GI TRACT, INTRALUMINAL, VIA CAPSULE;  Surgeon: Jose Britton MD;  Location: [facility];  Service: Endoscopy;  Laterality: N/A;    KNEE SURGERY  06/06/2013    right knee tear Dr Iverson     LUNG LOBECTOMY  1966    right middle lobectomy, due to Tb    OOPHORECTOMY      SHOULDER SURGERY      francis        Family History   Problem Relation Age of Onset    Colon cancer Other     Cancer Mother     Hypertension Mother     Hyperlipidemia Mother     Cancer Brother     Hypertension Father     Emphysema Father     Diabetes Son     Hypertension Son     Alcohol abuse Son     Diabetes Maternal Aunt     Alzheimer's disease Maternal Uncle     Cancer Maternal Grandmother     No Known Problems Daughter     Dementia Sister     Alzheimer's disease Sister     Breast cancer Sister 60    Obesity Paternal Uncle     Prostate cancer Other     Melanoma Neg Hx     Psoriasis Neg Hx     Lupus Neg Hx     Eczema Neg Hx     Amblyopia Neg Hx     Blindness Neg Hx     Cataracts Neg Hx     Glaucoma Neg Hx     Macular degeneration Neg Hx     Retinal detachment Neg Hx     Strabismus Neg Hx     Stroke Neg Hx     Thyroid disease Neg Hx        Social History     Socioeconomic History    Marital status:      Spouse name: Not on file    Number of children: Not on file    Years of education: Not on file    Highest education level: Not on file   Occupational History    Not on file   Social Needs    Financial resource strain: Not on file    Food insecurity     Worry: Not on file     Inability: Not on file     Transportation needs     Medical: Not on file     Non-medical: Not on file   Tobacco Use    Smoking status: Never Smoker    Smokeless tobacco: Never Used   Substance and Sexual Activity    Alcohol use: Not Currently     Comment: stopped 2019    Drug use: No    Sexual activity: Not Currently   Lifestyle    Physical activity     Days per week: Not on file     Minutes per session: Not on file    Stress: Not on file   Relationships    Social connections     Talks on phone: Not on file     Gets together: Not on file     Attends Bahai service: Not on file     Active member of club or organization: Not on file     Attends meetings of clubs or organizations: Not on file     Relationship status: Not on file   Other Topics Concern    Are you pregnant or think you may be? Not Asked    Breast-feeding Not Asked   Social History Narrative    Not on file       Review of patient's allergies indicates:   Allergen Reactions    No known drug allergies        Medications Reviewed: see MAR    ROS:    Constitutional: denies fevers, chills, night sweats, fatigue, malaise  Respiratory: negative for cough, shortness of breath, wheezing, dyspnea.  Cardiovascular: + for high blood pressure, negative for chest pain, varicose veins, ankle swelling, palpitations, syncope.  GI: negative for abdominal pain, heartburn, indigestion, nausea, vomiting, constipation, diarrhea, blood in stool.   Urology: as noted above in HPI  Endocrinology: negative for cold intolerance, excessive thirst, not feeling tired/sluggish, no heat intolerance.   Hematology/Lymph: negative for easy bleeding, easy bruising, swollen glands.  Musculoskeletal: negative for back pain, joint pain, joint swelling, neck pain.  Allergy-Immunology: negative for seasonal allergies, negative for unusual infections.   Skin: negative for boils, breast lumps, hives, itching, rash.   Neurology: negative for, dizziness, headache, tingling/numbness, tremors.   Psych: satisfied  "with life; negative for, anxiety, depression, suicidal thoughts.     PHYSICAL EXAM:    Vitals:    08/14/20 1348   BP: (!) 142/69   Pulse: 90   Resp: 18   Temp: 98 °F (36.7 °C)     Body mass index is 25.68 kg/m². Weight: 70 kg (154 lb 5.2 oz) Height: 5' 5" (165.1 cm)       General: Alert, cooperative, no distress, appears stated age  Head: Normocephalic, without obvious abnormality, atraumatic  Neck: no masses, no thyromegaly, no lymphadenopathy  Eyes: PERRL, conjunctiva/corneas clear  Lungs: Respirations unlabored, normal effort, no accessory muscle use  CV: Warm and well perfused extremities  Abdomen: Soft, non-tender, no CVA tenderness, no hepatosplenomegaly, no hernia  Extremities: Extremities normal, atraumatic, no cyanosis or edema  Skin: Normal color, texture, and turgor, no rashes or lesions  Psych: Appropriate, well oriented, normal affect, normal mood  Neuro: Non-focal      LABS:    No results found for this or any previous visit (from the past 336 hour(s)).      IMAGING:    Reviewed      Assessment/Diagnosis:    1. Urinary tract infection with hematuria, site unspecified  Ambulatory referral/consult to Urology   2. Dysuria  Ambulatory referral/consult to Urology   3. Microscopic hematuria         Plans:    - We discussed the etiology and management of the patient's microscopic hematuria. Explained that hematuria is multi-factorial and can be secondary to  cancer, obstructive uropathy, nephritis,  trauma,  infections, thrombosis, nephrolithiasis, bleeding disorders and medications. We discussed hematuria work-up and the benefit of further evaluation with cystourethroscopy to rule out any malignancy, stones or other pathology. All risks, benefits and alternatives discussed at length and all questions answered.    - Scheduled for cystoscopy on 9/2/20 at the [facility] [place] [facility] with local. Needs COVID.   - No further work-up of her UTI. Explained to patient that she has does not have frequent infections and an " occasional infection in a woman is not abnormal.

## 2020-08-17 ENCOUNTER — HOSPITAL ENCOUNTER (OUTPATIENT)
Dept: RADIOLOGY | Facility: HOSPITAL | Age: 75
Discharge: HOME | End: 2020-08-17
Attending: INTERNAL MEDICINE
Payer: MEDICARE

## 2020-08-17 DIAGNOSIS — R74.01 ELEVATED ALT MEASUREMENT: ICD-10-CM

## 2020-08-17 DIAGNOSIS — E65 ABDOMINAL OBESITY: ICD-10-CM

## 2020-08-17 DIAGNOSIS — N18.30 STAGE 3 CHRONIC KIDNEY DISEASE: ICD-10-CM

## 2020-08-17 PROCEDURE — 76705 ECHO EXAM OF ABDOMEN: CPT | Mod: 26,,, | Performed by: RADIOLOGY

## 2020-08-17 PROCEDURE — 76705 ECHO EXAM OF ABDOMEN: CPT | Mod: TC,PO

## 2020-08-17 PROCEDURE — 76705 US ABDOMEN LIMITED: ICD-10-PCS | Mod: 26,,, | Performed by: RADIOLOGY

## 2020-08-18 ENCOUNTER — OFFICE VISIT (OUTPATIENT)
Dept: SURGERY | Facility: CLINIC | Age: 75
End: 2020-08-18
Payer: MEDICARE

## 2020-08-18 VITALS
DIASTOLIC BLOOD PRESSURE: 70 MMHG | SYSTOLIC BLOOD PRESSURE: 163 MMHG | BODY MASS INDEX: 25.46 KG/M2 | HEART RATE: 75 BPM | WEIGHT: 153 LBS

## 2020-08-18 DIAGNOSIS — Z01.818 PREOP TESTING: ICD-10-CM

## 2020-08-18 DIAGNOSIS — K80.50 BILIARY COLIC: Primary | ICD-10-CM

## 2020-08-18 PROCEDURE — 1159F MED LIST DOCD IN RCRD: CPT | Mod: S$GLB,,, | Performed by: SURGERY

## 2020-08-18 PROCEDURE — 99214 PR OFFICE/OUTPT VISIT, EST, LEVL IV, 30-39 MIN: ICD-10-PCS | Mod: S$GLB,,, | Performed by: SURGERY

## 2020-08-18 PROCEDURE — 1101F PT FALLS ASSESS-DOCD LE1/YR: CPT | Mod: CPTII,S$GLB,, | Performed by: SURGERY

## 2020-08-18 PROCEDURE — 1101F PR PT FALLS ASSESS DOC 0-1 FALLS W/OUT INJ PAST YR: ICD-10-PCS | Mod: CPTII,S$GLB,, | Performed by: SURGERY

## 2020-08-18 PROCEDURE — 99214 OFFICE O/P EST MOD 30 MIN: CPT | Mod: S$GLB,,, | Performed by: SURGERY

## 2020-08-18 PROCEDURE — 1126F AMNT PAIN NOTED NONE PRSNT: CPT | Mod: S$GLB,,, | Performed by: SURGERY

## 2020-08-18 PROCEDURE — 1126F PR PAIN SEVERITY QUANTIFIED, NO PAIN PRESENT: ICD-10-PCS | Mod: S$GLB,,, | Performed by: SURGERY

## 2020-08-18 PROCEDURE — 3077F SYST BP >= 140 MM HG: CPT | Mod: CPTII,S$GLB,, | Performed by: SURGERY

## 2020-08-18 PROCEDURE — 3078F PR MOST RECENT DIASTOLIC BLOOD PRESSURE < 80 MM HG: ICD-10-PCS | Mod: CPTII,S$GLB,, | Performed by: SURGERY

## 2020-08-18 PROCEDURE — 99999 PR PBB SHADOW E&M-EST. PATIENT-LVL V: CPT | Mod: PBBFAC,,, | Performed by: SURGERY

## 2020-08-18 PROCEDURE — 3078F DIAST BP <80 MM HG: CPT | Mod: CPTII,S$GLB,, | Performed by: SURGERY

## 2020-08-18 PROCEDURE — 3077F PR MOST RECENT SYSTOLIC BLOOD PRESSURE >= 140 MM HG: ICD-10-PCS | Mod: CPTII,S$GLB,, | Performed by: SURGERY

## 2020-08-18 PROCEDURE — 1159F PR MEDICATION LIST DOCUMENTED IN MEDICAL RECORD: ICD-10-PCS | Mod: S$GLB,,, | Performed by: SURGERY

## 2020-08-18 PROCEDURE — 99999 PR PBB SHADOW E&M-EST. PATIENT-LVL V: ICD-10-PCS | Mod: PBBFAC,,, | Performed by: SURGERY

## 2020-08-18 RX ORDER — SODIUM CHLORIDE 9 MG/ML
INJECTION, SOLUTION INTRAVENOUS CONTINUOUS
Status: CANCELLED | OUTPATIENT
Start: 2020-09-17

## 2020-08-18 NOTE — PATIENT INSTRUCTIONS
..PRE-OP INSTRUCTIONS    Your procedure has been scheduled at:    [facility]pre-admit nurse  [phone]      DAY thursday    DATE 09/17/2020       Please call the pre-op nurse to schedule your pre-op testing and registration  Someone from the hospital will call you the evening before surgery to let you know what time you need to be at the hospital for surgery.                                               1:  DO NOT EAT OR DRINK ANYTHING AFTER MIDNIGHT THE NIGHT BEFORE SURGERY.     2:  You will need to stop any blood thinners 1 week prior to surgery.  This includes Aspirin, fish oil, Pradaxa, Coumadin, Plavix, Pletal.  Please contact the prescribing doctor to be sure it is ok to stop these medicines.    3:  Pre-admit nurse will go over your medicines and let you know which ones not to take the morning of surgery    4:  Plan to have someone drive you home after you are released from the hospital.  You WILL NOT be able to drive yourself.    5:  If you have any questions or need to change your surgery date, please call [facility] at [phone]    AFTER SURGERY:    You can shower 48 hours after surgery, REMOVE WET BANDAGES AND BANDAIDS, leave the steri- strips on.  If you have not had a bowel movement within 3 days after surgery you may take a laxative of your choice.  Do not lift anything over 5-10 pounds.    You need to have a follow up appointment 7-14 days after surgery, call the office to schedule or if you have questions or concerns.    For [facility] patients, you will receive a [facility] message with a link to schedule your post op appointment.

## 2020-08-18 NOTE — LETTER
August 31, 2020      Hilton Boykin MD  [address]  [facility] [address]           [facility]  [address]  [facility] [address]  Phone: [phone]          Patient: Erica Masterson   MR Number: [id number]   YOB: 1945   Date of Visit: 8/18/2020       Dear Dr. Hilton Boykin:    Thank you for referring Erica Masterson to me for evaluation. Attached you will find relevant portions of my assessment and plan of care.    If you have questions, please do not hesitate to call me. I look forward to following Erica Masterson along with you.    Sincerely,    Forrest Veronica MD    Enclosure  CC:  No Recipients    If you would like to receive this communication electronically, please contact [email or web address] or [phone] to request more information on [employer] Link access.    For providers and/or their staff who would like to refer a patient to [facility], please contact us through our one-stop-shop provider referral line, [facility], at [phone].    If you feel you have received this communication in error or would no longer like to receive these types of communications, please e-mail [email or web address]

## 2020-08-19 ENCOUNTER — TELEPHONE (OUTPATIENT)
Dept: UROLOGY | Facility: CLINIC | Age: 75
End: 2020-08-19

## 2020-08-19 NOTE — TELEPHONE ENCOUNTER
Called and spoke to patient regarding procedure. Patient stated that she is able to have procedure sooner if available. Informed patient that the soonest available will be 8/26 but will need to confirm with provider first. Informed patient that she will receive a call back. Patient verbalized understanding.    Please Advise.

## 2020-08-19 NOTE — TELEPHONE ENCOUNTER
Called Rod in [facility] to move procedure date from 9/2 to 8/26. Procedure rescheduled. Called and spoke to patient to inform that procedure has been rescheduled to 8/26 and covid test will need to be done on 8/23. Patient verbalized understanding by read back of dates and times.

## 2020-08-19 NOTE — TELEPHONE ENCOUNTER
----- Message from Albin Stewart sent at 8/19/2020  9:16 AM CDT -----  Type: Needs Medical Advice    Who Called:  Patient  Best Call Back Number: [phone]; [phone]  Additional Information: Patient would like to discuss upcoming procedure. Please call to advise. Thanks!

## 2020-08-23 ENCOUNTER — LAB VISIT (OUTPATIENT)
Dept: PRIMARY CARE CLINIC | Facility: CLINIC | Age: 75
End: 2020-08-23
Payer: MEDICARE

## 2020-08-23 DIAGNOSIS — R31.29 MICROSCOPIC HEMATURIA: ICD-10-CM

## 2020-08-23 PROCEDURE — U0003 INFECTIOUS AGENT DETECTION BY NUCLEIC ACID (DNA OR RNA); SEVERE ACUTE RESPIRATORY SYNDROME CORONAVIRUS 2 (SARS-COV-2) (CORONAVIRUS DISEASE [COVID-19]), AMPLIFIED PROBE TECHNIQUE, MAKING USE OF HIGH THROUGHPUT TECHNOLOGIES AS DESCRIBED BY CMS-2020-01-R: HCPCS

## 2020-08-24 ENCOUNTER — TELEPHONE (OUTPATIENT)
Dept: OPHTHALMOLOGY | Facility: CLINIC | Age: 75
End: 2020-08-24

## 2020-08-24 LAB — SARS-COV-2 RNA RESP QL NAA+PROBE: NOT DETECTED

## 2020-08-24 NOTE — TELEPHONE ENCOUNTER
----- Message from Lidya Chanel sent at 8/24/2020  2:29 PM CDT -----  Contact: patient  Type: Needs Medical Advice  Who Called:  Patient  Symptoms (please be specific):    How long has patient had these symptoms:    Pharmacy name and phone #:    Best Call Back Number: [phone]  Additional Information: requesting  a call back regarding upcoming visit

## 2020-08-25 NOTE — DISCHARGE INSTRUCTIONS
Before leaving, please make sure you have all your personal belongings such as glasses, purses, wallets, keys, cell phones, jewelry, jackets etc       Cystoscopy    Cystoscopy is a procedure that lets your doctor look directly inside your urethra and bladder. It can be used to:  · Help diagnose a problem with your urethra, bladder, or kidneys.  · Take a sample (biopsy) of bladder or urethral tissue.  · Treat certain problems (such as removing kidney stones).  · Place a stent to bypass an obstruction.  · Take special X-rays of the kidneys.  Based on the findings, your doctor may recommend other tests or treatments.  What is a cystoscope?  A cystoscope is a telescope-like instrument that contains lenses and fiberoptics (small glass wires that make bright light). The cystoscope may be straight and rigid, or flexible to bend around curves in the urethra. The doctor may look directly into the cystoscope, or project the image onto a monitor.  Getting ready  · Ask your doctor if you should stop taking any medicines before the procedure.  · Ask whether you should avoid eating or drinking anything after midnight before the procedure.  · Follow any other instructions your doctor gives you.  Tell your doctor before the exam if you:  · Take any medicines, such as aspirin or blood thinners  · Have allergies to any medicines  · Are pregnant   The procedure  Cystoscopy is done in the doctors office, surgery center, or hospital. The doctor and a nurse are present during the procedure. It takes only a few minutes, longer if a biopsy, X-ray, or treatment needs to be done.  During the procedure:  · You lie on an exam table on your back, knees bent and legs apart. You are covered with a drape.  · Your urethra and the area around it are washed. Anesthetic jelly may be applied to numb the urethra. Other pain medicine is usually not needed. In some cases, you may be offered a mild sedative to help you relax. If a more extensive procedure  is to be done, such as a biopsy or kidney stone removal, general anesthesia may be needed.  · The cystoscope is inserted. A sterile fluid is put into the bladder to expand it. You may feel pressure from this fluid.  · When the procedure is done, the cystoscope is removed.  After the procedure  If you had a sedative, general anesthesia, or spinal anesthesia, you must have someone drive you home. Once youre home:  · Drink plenty of fluids.  · You may have burning or light bleeding when you urinate--this is normal.  · Medicines may be prescribed to ease any discomfort or prevent infection. Take these as directed.  · Call your doctor if you have heavy bleeding or blood clots, burning that lasts more than a day, a fever over 100°F  (38° C), or trouble urinating.    After Surgery:  Always be aware that any surgery can cause these symptoms:    Pain- Medication can be prescribed for pain to decrease your pain but may not completely take your pain away.  Over the Counter pain medicine my be enough and you can always use Ice and rest to help ease pain.    Bleeding- a little bleeding after a surgery is usually within normal.  If there is a lot of blood you need to notify your MD.  Emergency treatments of bleeding are cold application, elevation of the bleeding site and compression.    Infection- Infection after surgery is NOT a normal occurrence.  Signs of infection are fever, swelling, hot to touch the incision.  If this occurs notify your MD immediately.    Nausea- this can be common after a surgery especially if you have had anesthesia medicine or are taking pain medicine.  Staying on clear liquids, bland foods, gingerale, or over the counter anti nausea medicines can help.  If you vomit more than once, notify your MD.  Anti Nausea medicines can be prescribed.

## 2020-08-26 ENCOUNTER — HOSPITAL ENCOUNTER (OUTPATIENT)
Facility: AMBULARY SURGERY CENTER | Age: 75
Discharge: HOME | End: 2020-08-26
Attending: UROLOGY | Admitting: UROLOGY
Payer: MEDICARE

## 2020-08-26 DIAGNOSIS — N39.0 URINARY TRACT INFECTION WITH HEMATURIA, SITE UNSPECIFIED: ICD-10-CM

## 2020-08-26 DIAGNOSIS — N30.01 ACUTE CYSTITIS WITH HEMATURIA: Primary | ICD-10-CM

## 2020-08-26 DIAGNOSIS — R31.29 MICROSCOPIC HEMATURIA: ICD-10-CM

## 2020-08-26 DIAGNOSIS — R31.9 URINARY TRACT INFECTION WITH HEMATURIA, SITE UNSPECIFIED: ICD-10-CM

## 2020-08-26 PROCEDURE — 52000 CYSTOURETHROSCOPY: CPT | Performed by: UROLOGY

## 2020-08-26 PROCEDURE — 52000 CYSTOURETHROSCOPY: CPT | Mod: ,,, | Performed by: UROLOGY

## 2020-08-26 PROCEDURE — 52000 PR CYSTOURETHROSCOPY: ICD-10-PCS | Mod: ,,, | Performed by: UROLOGY

## 2020-08-26 RX ORDER — WATER 1 ML/ML
IRRIGANT IRRIGATION
Status: DISCONTINUED | OUTPATIENT
Start: 2020-08-26 | End: 2020-08-26 | Stop reason: HOSPADM

## 2020-08-26 RX ORDER — LIDOCAINE HYDROCHLORIDE 20 MG/ML
JELLY TOPICAL
Status: DISCONTINUED | OUTPATIENT
Start: 2020-08-26 | End: 2020-08-26 | Stop reason: HOSPADM

## 2020-08-26 RX ORDER — CEPHALEXIN 500 MG/1
500 CAPSULE ORAL EVERY 6 HOURS
Qty: 12 CAPSULE | Refills: 0 | Status: SHIPPED | OUTPATIENT
Start: 2020-08-26 | End: 2020-08-29

## 2020-08-26 NOTE — INTERVAL H&P NOTE
The patient has been examined and the H&P has been reviewed:    I concur with the findings and changes have been noted since the H&P was written: Ok to proceed at the [facility] today.     Surgery risks, benefits and alternative options discussed and understood by patient/family.          Active Hospital Problems    Diagnosis  POA    Urinary tract infection with hematuria [N39.0, R31.9]  Yes      Resolved Hospital Problems   No resolved problems to display.

## 2020-08-26 NOTE — OP NOTE
[facility]  Operative/Discharge Note    Date: 08/26/2020    Pre-Op Diagnosis: Microscopic hematuria    Post-Op Diagnosis: Same    Procedure(s) Performed:   1.  Cystoscopy     Specimen(s): None    Staff Surgeon: Charlotte Walters MD    Assistant Surgeon: Charlotte Walters Jr, MD    Anesthesia: Local - lidocaine gel    Indications: Erica Masterson is a 74 y.o. female with microscopic hematuria and recent urinary tract infection.     Patient recently evaluated by Ellyn Jean-Baptiste for right hydronephrosis.      She underwent CT abd/pelvis with contrast on 6/9/20 for epigastric pain and was incidentally found to have mild right hydronephrosis and right phleboliths.      US renal on 6/14/20 was negative for any hydronephrosis.     She continues to have mild right lower quadrant abdominal pain.   Of note, her most recent UA micro showed microscopic hematuria.         Interval History     8/14/20: Patient states that she recently had an episode of dysuria and was told she had a UTI. She states that her dysuria has since resolved. Last urine culture with multiple orgs suggesting contamination.      She denies any fever, chills, bone pain, weight loss,  trauma or history of  malignancy.         Urine culture  Multiple orgs    7/28/20  No growth        6/11/20  No growth        6/4/20  No growth        5/15/20  No growth        4/30/20        Urine cytology  Negative          6/11/20    Findings: Unremarkable cystoscopy, mild hypervascularity of bladder neck. Otherwise no significant pathology to explain microscopic hematuria.     Estimated Blood Loss: min    Drains: None    Procedure in Detail:  After risks, benefits and possible complications of cystoscopy were explained, the patient elected to undergo the procedure and informed consent was obtained. All questions were answered in the shelbie-operative area. The patient was transferred to the cystoscopy suite and placed in the lithotomy position.  The patient was prepped and  draped in the usual sterile fashion. Lidocaine jelly was administered for local anesthesia. Time out was performed.    A flexible cystoscope was introduced into the bladder per urethra. This passed easily.  The entire urethra was visualized which showed no masses or strictures.  The right and left ureteral orifices were identified in the normal anatomic position and were seen effluxing clear urine.  Formal cystoscopy was performed which revealed no masses or lesions suspicious for malignancy, no trabeculations, no bladder stones and no bladder diverticuli.      The patient tolerated the procedure well and was transferred to recovery in stable condition.    Disposition: Home    Discharge home today status post uncomplicated procedure as above  Diet - resume home diet  Follow up: RTC PRN. Hematuria work-up negative. Mild right hydronephrosis resolved. No history of recurrent UTI.   Instructions: Keflex RX sent for UTI prophylaxis.   Meds:     Medication List      START taking these medications    cephALEXin 500 MG capsule  Commonly known as: KEFLEX  Take 1 capsule (500 mg total) by mouth every 6 (six) hours. for 3 days        CONTINUE taking these medications    amLODIPine 2.5 MG tablet  Commonly known as: NORVASC  Take 1 tablet (2.5 mg total) by mouth once daily.     aspirin 81 MG EC tablet  Commonly known as: ECOTRIN     BIOTENE DRY MOUTH ORAL RINSE MM     blood sugar diagnostic Strp  Commonly known as: BLOOD GLUCOSE TEST  True Metrix glucose strips check once daily     blood-glucose meter kit  True Metrix glucometer check glucose once daily     CULTURELLE 15 billion cell capsule  Generic drug: Lactobacillus rhamnosus GG     cyanocobalamin 100 MCG tablet  Commonly known as: VITAMIN B-12     doxepin 10 MG capsule  Commonly known as: SINEQUAN  Take 1 capsule (10 mg total) by mouth every evening.     DRY EYE RELIEF 1-0.2-0.2 % Drop  Generic drug: peg 400-hypromellose-glycerin     JANUVIA 25 MG Tab  Generic drug:  SITagliptin  Take 1 tablet (25 mg total) by mouth once daily.     lancets Misc  True Metrix lancets check once daily     lansoprazole 30 MG capsule  Commonly known as: PREVACID     linaCLOtide 290 mcg Cap capsule  Commonly known as: LINZESS  Take 1 capsule (290 mcg total) by mouth once daily.     losartan 100 MG tablet  Commonly known as: COZAAR  Take 1 tablet (100 mg total) by mouth once daily.     pantoprazole 40 MG tablet  Commonly known as: PROTONIX     psyllium powder  Commonly known as: METAMUCIL     RESTASIS 0.05 % ophthalmic emulsion  Generic drug: cycloSPORINE  Place 1 drop into both eyes 2 (two) times daily.     rosuvastatin 20 MG tablet  Commonly known as: CRESTOR  Take 1 tablet (20 mg total) by mouth every evening.     sucralfate 1 gram tablet  Commonly known as: CARAFATE           Where to Get Your Medications      These medications were sent to [id number] - [place] - [address], [place] [address]    Phone: [phone]   · cephALEXin 500 MG capsule         Charlotte Walters Jr, MD

## 2020-08-27 ENCOUNTER — TELEPHONE (OUTPATIENT)
Dept: UROLOGY | Facility: CLINIC | Age: 75
End: 2020-08-27

## 2020-08-27 VITALS
DIASTOLIC BLOOD PRESSURE: 67 MMHG | TEMPERATURE: 98 F | SYSTOLIC BLOOD PRESSURE: 148 MMHG | WEIGHT: 154.31 LBS | RESPIRATION RATE: 19 BRPM | HEIGHT: 65 IN | HEART RATE: 60 BPM | OXYGEN SATURATION: 100 % | BODY MASS INDEX: 25.71 KG/M2

## 2020-08-27 NOTE — TELEPHONE ENCOUNTER
----- Message from Lottie Ramirez sent at 8/27/2020  9:19 AM CDT -----  Type: Needs Medical Advice  Who Called:  pt  Pharmacy name and phone #:    [id number] [place] [facility] [address]  [place] [facility] [place] [address]  Phone: [phone] Fax: [phone]  Best Call Back Number: [phone]  Additional Information: pt states that she spoke to the pharmacy was told have the nurse or Dr. Walters call the pharmacy to let them know what antibiotics she is allergic to. Please give call back. thanks

## 2020-08-27 NOTE — TELEPHONE ENCOUNTER
Called and spoke to patient to inform per provider that antibiotics was just given as a precaution. Ok to discontinue and does not need a new prescription. Patient verbalized understanding.

## 2020-08-27 NOTE — TELEPHONE ENCOUNTER
----- Message from Kristina Rocha sent at 8/27/2020  8:27 AM CDT -----  Type: Needs Medical Advice  Who Called:  Patient  Best Call Back Number: [phone] (home)   Additional Information: the patient said that she feels she may be having an allergic reaction to the antibiotics she is asking for a call back today asap, she is having headaches, and abd pain and dizzy every time she takes one

## 2020-08-27 NOTE — TELEPHONE ENCOUNTER
Called and spoke to patient who stated that she took the antibiotics prescribed at 3:30pm and 9:30pm yesterday and after taking them experienced tingling on extremities, headaches, dizziness and abdominal pain. Patient states she noticed that everything she take antibiotics (Keflex, Augmentin, and Bactrim) this happens. Informed patient to discontinue Keflex and will send message to provider to prescribe an alternative medication. Patient chart updated. Patient verbalized understanding.      Please Advise.

## 2020-08-27 NOTE — TELEPHONE ENCOUNTER
Called and spoke to pharmacy to inform that patient stated she is allergic to Augmentin, Bactrim and Keflex. Informed pharmacy that Keflex was only given as precaution and patient was informed to discontinue and does not need another prescription. Pharmacy verbalized understanding.

## 2020-08-28 ENCOUNTER — TELEPHONE (OUTPATIENT)
Dept: CARDIOLOGY | Facility: CLINIC | Age: 75
End: 2020-08-28

## 2020-08-28 NOTE — TELEPHONE ENCOUNTER
----- Message from Joanie Joreg sent at 8/28/2020 10:31 AM CDT -----  Regarding: results  Contact: TODD RODRIGUEZ [[id number]]  Patient is calling for US results from 8/17/2020. Please call patient at [phone] or [phone]. Thanks!

## 2020-08-28 NOTE — TELEPHONE ENCOUNTER
Call placed to Ms. Maldonado, advised her per Dr. Stubbs that her Abdominal US was normal. She verbalized understanding. No further issues noted.

## 2020-08-31 NOTE — PROGRESS NOTES
Subjective:       Patient ID: Erica Masterson is a 74 y.o. female.    Chief Complaint: Consult (galblader consult)      HPI 74-year-old female with right upper quadrant abdominal pain and nausea and cramping.  This is worse at night.  She denies any reflux symptoms.  The pain is worse after eating.    Past Medical History:   Diagnosis Date    Allergy     Anxiety     Cataract     Colon polyp     Degenerative arthritis of knee 4/3/2012    Diverticulosis     GERD (gastroesophageal reflux disease)     Hyperlipidemia     Hypertension     Multinodular goiter 3/26/2012    Myopathy, unspecified 1/18/2010    New onset type 2 diabetes mellitus 3/24/2020    New onset type 2 diabetes mellitus 3/24/2020    Tuberculosis      Past Surgical History:   Procedure Laterality Date    BREAST BIOPSY Left 2015    benign    COLONOSCOPY  12/2/15    Dr. Britton, multiple polyps, recheck five years-three years if more than 2 polyps are adenomatous    COLONOSCOPY N/A 12/2/2015    COLONOSCOPY N/A 3/20/2019    Procedure: COLONOSCOPY;  Surgeon: Jose Britton MD;  Location: [facility];  Service: Endoscopy;  Laterality: N/A;    COLONOSCOPY N/A 5/20/2020    Dr. Britton; internal hemorrhoids; diverticulosis; polyps removed; repeat in 3 years    CYSTOSCOPY N/A 8/26/2020    Procedure: CYSTOSCOPY;  Surgeon: Charlotte Walters Jr., MD;  [facility];  Service: Urology;  Laterality: N/A;    ESOPHAGOGASTRODUODENOSCOPY N/A 5/20/2020    Dr. Britton; small hiatal hernia; gastritis; 8 gastric polyps removed    FOOT SURGERY      HYSTERECTOMY      INTRALUMINAL GASTROINTESTINAL TRACT IMAGING VIA CAPSULE N/A 6/4/2020    Procedure: IMAGING PROCEDURE, GI TRACT, INTRALUMINAL, VIA CAPSULE;  Surgeon: Jose Britton MD;  Location: [facility];  Service: Endoscopy;  Laterality: N/A;    KNEE SURGERY  06/06/2013    right knee tear Dr Iverson     LUNG LOBECTOMY  1966    right middle lobectomy, due to Tb    OOPHORECTOMY      SHOULDER SURGERY       francis          Current Outpatient Medications:     amLODIPine (NORVASC) 2.5 MG tablet, Take 1 tablet (2.5 mg total) by mouth once daily., Disp: 30 tablet, Rfl: 11    aspirin (ECOTRIN) 81 MG EC tablet, Take 162 mg by mouth once daily. , Disp: , Rfl:     blood sugar diagnostic (BLOOD GLUCOSE TEST) Strp, True Metrix glucose strips check once daily, Disp: 100 each, Rfl: 3    blood-glucose meter kit, True Metrix glucometer check glucose once daily, Disp: 1 each, Rfl: 0    cyanocobalamin (VITAMIN B-12) 100 MCG tablet, Take 100 mcg by mouth once daily., Disp: , Rfl:     cycloSPORINE (RESTASIS) 0.05 % ophthalmic emulsion, Place 1 drop into both eyes 2 (two) times daily., Disp: 60 vial, Rfl: 11    doxepin (SINEQUAN) 10 MG capsule, Take 1 capsule (10 mg total) by mouth every evening., Disp: 30 capsule, Rfl: 5    JANUVIA 25 mg Tab, Take 1 tablet (25 mg total) by mouth once daily., Disp: 90 tablet, Rfl: 3    Lactobacillus rhamnosus GG (CULTURELLE) 15 billion cell capsule, Take 1 capsule by mouth once daily., Disp: , Rfl:     lancets Misc, True Metrix lancets check once daily, Disp: 100 each, Rfl: 3    lansoprazole (PREVACID) 30 MG capsule, Take 30 mg by mouth once daily., Disp: , Rfl:     linaCLOtide (LINZESS) 290 mcg Cap capsule, Take 1 capsule (290 mcg total) by mouth once daily., Disp: 90 capsule, Rfl: 3    losartan (COZAAR) 100 MG tablet, Take 1 tablet (100 mg total) by mouth once daily., Disp: 90 tablet, Rfl: 3    pantoprazole (PROTONIX) 40 MG tablet, Take 40 mg by mouth 2 (two) times daily. , Disp: , Rfl:     peg 400-hypromellose-glycerin (DRY EYE RELIEF) 1-0.2-0.2 % Drop, Apply 1 drop to eye 2 (two) times daily as needed., Disp: , Rfl:     psyllium (METAMUCIL) powder, Take 1 packet by mouth 2 (two) times daily as needed., Disp: , Rfl:     rosuvastatin (CRESTOR) 20 MG tablet, Take 1 tablet (20 mg total) by mouth every evening., Disp: 90 tablet, Rfl: 3    saliva substitute combo no.9 (BIOTENE DRY MOUTH  ORAL RINSE MM), 1 Dose by Mucous Membrane route once daily., Disp: , Rfl:     sucralfate (CARAFATE) 1 gram tablet, , Disp: , Rfl:     Review of patient's allergies indicates:   Allergen Reactions    Augmentin [amoxicillin-pot clavulanate]      Extremities tingling, headache, dizziness    Bactrim [sulfamethoxazole-trimethoprim] Other (See Comments)     Extremities tingling, headache, dizziness    Keflex [cephalexin]      Extremities tingling. Headache, tingling       Family History   Problem Relation Age of Onset    Colon cancer Other     Cancer Mother     Hypertension Mother     Hyperlipidemia Mother     Cancer Brother     Hypertension Father     Emphysema Father     Diabetes Son     Hypertension Son     Alcohol abuse Son     Diabetes Maternal Aunt     Alzheimer's disease Maternal Uncle     Cancer Maternal Grandmother     No Known Problems Daughter     Dementia Sister     Alzheimer's disease Sister     Breast cancer Sister 60    Obesity Paternal Uncle     Prostate cancer Other     Melanoma Neg Hx     Psoriasis Neg Hx     Lupus Neg Hx     Eczema Neg Hx     Amblyopia Neg Hx     Blindness Neg Hx     Cataracts Neg Hx     Glaucoma Neg Hx     Macular degeneration Neg Hx     Retinal detachment Neg Hx     Strabismus Neg Hx     Stroke Neg Hx     Thyroid disease Neg Hx      Social History     Socioeconomic History    Marital status:      Spouse name: Not on file    Number of children: Not on file    Years of education: Not on file    Highest education level: Not on file   Occupational History    Not on file   Social Needs    Financial resource strain: Not on file    Food insecurity     Worry: Not on file     Inability: Not on file    Transportation needs     Medical: Not on file     Non-medical: Not on file   Tobacco Use    Smoking status: Never Smoker    Smokeless tobacco: Never Used   Substance and Sexual Activity    Alcohol use: Not Currently     Comment: stopped 2019     Drug use: No    Sexual activity: Not Currently   Lifestyle    Physical activity     Days per week: Not on file     Minutes per session: Not on file    Stress: Not on file   Relationships    Social connections     Talks on phone: Not on file     Gets together: Not on file     Attends Congregation service: Not on file     Active member of club or organization: Not on file     Attends meetings of clubs or organizations: Not on file     Relationship status: Not on file   Other Topics Concern    Are you pregnant or think you may be? Not Asked    Breast-feeding Not Asked   Social History Narrative    Not on file       Review of Systems   Constitutional: Negative for activity change, chills, fever and unexpected weight change.   HENT: Negative for congestion, sore throat, trouble swallowing and voice change.    Eyes: Negative for redness and visual disturbance.   Respiratory: Negative for cough, shortness of breath and wheezing.    Cardiovascular: Negative for chest pain and palpitations.   Gastrointestinal: Positive for abdominal pain and nausea. Negative for blood in stool and vomiting.   Endocrine: Negative.    Genitourinary: Negative for dysuria, frequency and hematuria.   Musculoskeletal: Negative for arthralgias, back pain and neck pain.   Skin: Negative for rash and wound.   Allergic/Immunologic: Negative.    Neurological: Negative for dizziness, weakness and headaches.   Hematological: Negative for adenopathy.   Psychiatric/Behavioral: Negative for agitation and dysphoric mood. The patient is not nervous/anxious.      Objective:     Physical Exam  Constitutional:       General: She is not in acute distress.     Appearance: She is well-developed.   HENT:      Head: Normocephalic and atraumatic.      Mouth/Throat:      Pharynx: No oropharyngeal exudate.   Eyes:      General: No scleral icterus.     Conjunctiva/sclera: Conjunctivae normal.      Pupils: Pupils are equal, round, and reactive to light.   Neck:       Musculoskeletal: Normal range of motion.      Thyroid: No thyromegaly.   Cardiovascular:      Rate and Rhythm: Normal rate and regular rhythm.      Heart sounds: No murmur.   Pulmonary:      Effort: Pulmonary effort is normal.      Breath sounds: Normal breath sounds. No wheezing or rales.   Abdominal:      General: Bowel sounds are normal. There is no distension.      Palpations: Abdomen is soft. There is no mass.      Tenderness: There is abdominal tenderness.      Hernia: No hernia is present.   Musculoskeletal: Normal range of motion.   Lymphadenopathy:      Cervical: No cervical adenopathy.   Skin:     General: Skin is warm and dry.      Findings: No erythema or rash.   Neurological:      Mental Status: She is alert and oriented to person, place, and time.      Cranial Nerves: No cranial nerve deficit.   Psychiatric:         Behavior: Behavior normal.       Assessment:     Encounter Diagnoses   Name Primary?    Preop testing     Biliary colic Yes       Plan:      1.  Plan laparoscopic cholecystectomy.  2. Risks and benefits of the planned procedure were discussed at length with the patient.  Risks and benefits of not proceeding with the procedure were discussed as well. All questions were answered. The patient expressed clear understanding and would like to proceed with the procedure as discussed.

## 2020-08-31 NOTE — H&P (VIEW-ONLY)
Subjective:       Patient ID: Erica Masterson is a 74 y.o. female.    Chief Complaint: Consult (galblader consult)      HPI 74-year-old female with right upper quadrant abdominal pain and nausea and cramping.  This is worse at night.  She denies any reflux symptoms.  The pain is worse after eating.    Past Medical History:   Diagnosis Date    Allergy     Anxiety     Cataract     Colon polyp     Degenerative arthritis of knee 4/3/2012    Diverticulosis     GERD (gastroesophageal reflux disease)     Hyperlipidemia     Hypertension     Multinodular goiter 3/26/2012    Myopathy, unspecified 1/18/2010    New onset type 2 diabetes mellitus 3/24/2020    New onset type 2 diabetes mellitus 3/24/2020    Tuberculosis      Past Surgical History:   Procedure Laterality Date    BREAST BIOPSY Left 2015    benign    COLONOSCOPY  12/2/15    Dr. Britton, multiple polyps, recheck five years-three years if more than 2 polyps are adenomatous    COLONOSCOPY N/A 12/2/2015    COLONOSCOPY N/A 3/20/2019    Procedure: COLONOSCOPY;  Surgeon: Jose Britton MD;  Location: [facility];  Service: Endoscopy;  Laterality: N/A;    COLONOSCOPY N/A 5/20/2020    Dr. Britton; internal hemorrhoids; diverticulosis; polyps removed; repeat in 3 years    CYSTOSCOPY N/A 8/26/2020    Procedure: CYSTOSCOPY;  Surgeon: Charlotte Walters Jr., MD;  Location: [facility];  Service: Urology;  Laterality: N/A;    ESOPHAGOGASTRODUODENOSCOPY N/A 5/20/2020    Dr. Britton; small hiatal hernia; gastritis; 8 gastric polyps removed    FOOT SURGERY      HYSTERECTOMY      INTRALUMINAL GASTROINTESTINAL TRACT IMAGING VIA CAPSULE N/A 6/4/2020    Procedure: IMAGING PROCEDURE, GI TRACT, INTRALUMINAL, VIA CAPSULE;  Surgeon: Jose Britton MD;  Location: [facility];  Service: Endoscopy;  Laterality: N/A;    KNEE SURGERY  06/06/2013    right knee tear Dr Iverson     LUNG LOBECTOMY  1966    right middle lobectomy, due to Tb    OOPHORECTOMY      SHOULDER SURGERY       francis          Current Outpatient Medications:     amLODIPine (NORVASC) 2.5 MG tablet, Take 1 tablet (2.5 mg total) by mouth once daily., Disp: 30 tablet, Rfl: 11    aspirin (ECOTRIN) 81 MG EC tablet, Take 162 mg by mouth once daily. , Disp: , Rfl:     blood sugar diagnostic (BLOOD GLUCOSE TEST) Strp, True Metrix glucose strips check once daily, Disp: 100 each, Rfl: 3    blood-glucose meter kit, True Metrix glucometer check glucose once daily, Disp: 1 each, Rfl: 0    cyanocobalamin (VITAMIN B-12) 100 MCG tablet, Take 100 mcg by mouth once daily., Disp: , Rfl:     cycloSPORINE (RESTASIS) 0.05 % ophthalmic emulsion, Place 1 drop into both eyes 2 (two) times daily., Disp: 60 vial, Rfl: 11    doxepin (SINEQUAN) 10 MG capsule, Take 1 capsule (10 mg total) by mouth every evening., Disp: 30 capsule, Rfl: 5    JANUVIA 25 mg Tab, Take 1 tablet (25 mg total) by mouth once daily., Disp: 90 tablet, Rfl: 3    Lactobacillus rhamnosus GG (CULTURELLE) 15 billion cell capsule, Take 1 capsule by mouth once daily., Disp: , Rfl:     lancets Misc, True Metrix lancets check once daily, Disp: 100 each, Rfl: 3    lansoprazole (PREVACID) 30 MG capsule, Take 30 mg by mouth once daily., Disp: , Rfl:     linaCLOtide (LINZESS) 290 mcg Cap capsule, Take 1 capsule (290 mcg total) by mouth once daily., Disp: 90 capsule, Rfl: 3    losartan (COZAAR) 100 MG tablet, Take 1 tablet (100 mg total) by mouth once daily., Disp: 90 tablet, Rfl: 3    pantoprazole (PROTONIX) 40 MG tablet, Take 40 mg by mouth 2 (two) times daily. , Disp: , Rfl:     peg 400-hypromellose-glycerin (DRY EYE RELIEF) 1-0.2-0.2 % Drop, Apply 1 drop to eye 2 (two) times daily as needed., Disp: , Rfl:     psyllium (METAMUCIL) powder, Take 1 packet by mouth 2 (two) times daily as needed., Disp: , Rfl:     rosuvastatin (CRESTOR) 20 MG tablet, Take 1 tablet (20 mg total) by mouth every evening., Disp: 90 tablet, Rfl: 3    saliva substitute combo no.9 (BIOTENE DRY MOUTH  ORAL RINSE MM), 1 Dose by Mucous Membrane route once daily., Disp: , Rfl:     sucralfate (CARAFATE) 1 gram tablet, , Disp: , Rfl:     Review of patient's allergies indicates:   Allergen Reactions    Augmentin [amoxicillin-pot clavulanate]      Extremities tingling, headache, dizziness    Bactrim [sulfamethoxazole-trimethoprim] Other (See Comments)     Extremities tingling, headache, dizziness    Keflex [cephalexin]      Extremities tingling. Headache, tingling       Family History   Problem Relation Age of Onset    Colon cancer Other     Cancer Mother     Hypertension Mother     Hyperlipidemia Mother     Cancer Brother     Hypertension Father     Emphysema Father     Diabetes Son     Hypertension Son     Alcohol abuse Son     Diabetes Maternal Aunt     Alzheimer's disease Maternal Uncle     Cancer Maternal Grandmother     No Known Problems Daughter     Dementia Sister     Alzheimer's disease Sister     Breast cancer Sister 60    Obesity Paternal Uncle     Prostate cancer Other     Melanoma Neg Hx     Psoriasis Neg Hx     Lupus Neg Hx     Eczema Neg Hx     Amblyopia Neg Hx     Blindness Neg Hx     Cataracts Neg Hx     Glaucoma Neg Hx     Macular degeneration Neg Hx     Retinal detachment Neg Hx     Strabismus Neg Hx     Stroke Neg Hx     Thyroid disease Neg Hx      Social History     Socioeconomic History    Marital status:      Spouse name: Not on file    Number of children: Not on file    Years of education: Not on file    Highest education level: Not on file   Occupational History    Not on file   Social Needs    Financial resource strain: Not on file    Food insecurity     Worry: Not on file     Inability: Not on file    Transportation needs     Medical: Not on file     Non-medical: Not on file   Tobacco Use    Smoking status: Never Smoker    Smokeless tobacco: Never Used   Substance and Sexual Activity    Alcohol use: Not Currently     Comment: stopped 2019     Drug use: No    Sexual activity: Not Currently   Lifestyle    Physical activity     Days per week: Not on file     Minutes per session: Not on file    Stress: Not on file   Relationships    Social connections     Talks on phone: Not on file     Gets together: Not on file     Attends Sabianism service: Not on file     Active member of club or organization: Not on file     Attends meetings of clubs or organizations: Not on file     Relationship status: Not on file   Other Topics Concern    Are you pregnant or think you may be? Not Asked    Breast-feeding Not Asked   Social History Narrative    Not on file       Review of Systems   Constitutional: Negative for activity change, chills, fever and unexpected weight change.   HENT: Negative for congestion, sore throat, trouble swallowing and voice change.    Eyes: Negative for redness and visual disturbance.   Respiratory: Negative for cough, shortness of breath and wheezing.    Cardiovascular: Negative for chest pain and palpitations.   Gastrointestinal: Positive for abdominal pain and nausea. Negative for blood in stool and vomiting.   Endocrine: Negative.    Genitourinary: Negative for dysuria, frequency and hematuria.   Musculoskeletal: Negative for arthralgias, back pain and neck pain.   Skin: Negative for rash and wound.   Allergic/Immunologic: Negative.    Neurological: Negative for dizziness, weakness and headaches.   Hematological: Negative for adenopathy.   Psychiatric/Behavioral: Negative for agitation and dysphoric mood. The patient is not nervous/anxious.      Objective:     Physical Exam  Constitutional:       General: She is not in acute distress.     Appearance: She is well-developed.   HENT:      Head: Normocephalic and atraumatic.      Mouth/Throat:      Pharynx: No oropharyngeal exudate.   Eyes:      General: No scleral icterus.     Conjunctiva/sclera: Conjunctivae normal.      Pupils: Pupils are equal, round, and reactive to light.   Neck:       Musculoskeletal: Normal range of motion.      Thyroid: No thyromegaly.   Cardiovascular:      Rate and Rhythm: Normal rate and regular rhythm.      Heart sounds: No murmur.   Pulmonary:      Effort: Pulmonary effort is normal.      Breath sounds: Normal breath sounds. No wheezing or rales.   Abdominal:      General: Bowel sounds are normal. There is no distension.      Palpations: Abdomen is soft. There is no mass.      Tenderness: There is abdominal tenderness.      Hernia: No hernia is present.   Musculoskeletal: Normal range of motion.   Lymphadenopathy:      Cervical: No cervical adenopathy.   Skin:     General: Skin is warm and dry.      Findings: No erythema or rash.   Neurological:      Mental Status: She is alert and oriented to person, place, and time.      Cranial Nerves: No cranial nerve deficit.   Psychiatric:         Behavior: Behavior normal.       Assessment:     Encounter Diagnoses   Name Primary?    Preop testing     Biliary colic Yes       Plan:      1.  Plan laparoscopic cholecystectomy.  2. Risks and benefits of the planned procedure were discussed at length with the patient.  Risks and benefits of not proceeding with the procedure were discussed as well. All questions were answered. The patient expressed clear understanding and would like to proceed with the procedure as discussed.

## 2020-09-01 ENCOUNTER — TELEPHONE (OUTPATIENT)
Dept: SURGERY | Facility: CLINIC | Age: 75
End: 2020-09-01

## 2020-09-01 NOTE — TELEPHONE ENCOUNTER
----- Message from Lesley Fournier sent at 9/1/2020 10:52 AM CDT -----  Contact: call  pt [phone] // cell [phone]    Type: Needs Medical Advice  Who Called:  call  pt [phone] // cell [phone]  Best Call Back Number: call  pt [phone] // cell [phone]  Additional Information:   please call  to  discuss   the  up  coming  procedure  per ;pt

## 2020-09-08 ENCOUNTER — TELEPHONE (OUTPATIENT)
Dept: SURGERY | Facility: CLINIC | Age: 75
End: 2020-09-08

## 2020-09-08 NOTE — TELEPHONE ENCOUNTER
----- Message from Latisha Bennett sent at 9/5/2020 11:26 AM CDT -----  Regarding: advice  Contact: self  Type: Needs Medical Advice  Who Called:  self   Symptoms (please be specific):  NA  How long has patient had these symptoms:   Pharmacy name and phone #:    Best Call Back Number: [phone]/cell [phone]  Additional Information: Patient requesting to discuss ultrasound of the abdomen area, pt states the gall bladder test results was normal.

## 2020-09-14 ENCOUNTER — LAB VISIT (OUTPATIENT)
Dept: PRIMARY CARE CLINIC | Facility: CLINIC | Age: 75
End: 2020-09-14
Payer: MEDICARE

## 2020-09-14 ENCOUNTER — HOSPITAL ENCOUNTER (OUTPATIENT)
Dept: PREADMISSION TESTING | Facility: HOSPITAL | Age: 75
Discharge: HOME | End: 2020-09-14
Payer: MEDICARE

## 2020-09-14 DIAGNOSIS — Z01.818 PREOP TESTING: ICD-10-CM

## 2020-09-14 DIAGNOSIS — Z01.818 PREOP TESTING: Primary | ICD-10-CM

## 2020-09-14 PROCEDURE — U0003 INFECTIOUS AGENT DETECTION BY NUCLEIC ACID (DNA OR RNA); SEVERE ACUTE RESPIRATORY SYNDROME CORONAVIRUS 2 (SARS-COV-2) (CORONAVIRUS DISEASE [COVID-19]), AMPLIFIED PROBE TECHNIQUE, MAKING USE OF HIGH THROUGHPUT TECHNOLOGIES AS DESCRIBED BY CMS-2020-01-R: HCPCS

## 2020-09-15 LAB — SARS-COV-2 RNA RESP QL NAA+PROBE: NOT DETECTED

## 2020-09-16 ENCOUNTER — ANESTHESIA EVENT (OUTPATIENT)
Dept: SURGERY | Facility: HOSPITAL | Age: 75
End: 2020-09-16
Payer: MEDICARE

## 2020-09-17 ENCOUNTER — TELEPHONE (OUTPATIENT)
Dept: SURGERY | Facility: CLINIC | Age: 75
End: 2020-09-17

## 2020-09-17 ENCOUNTER — ANESTHESIA (OUTPATIENT)
Dept: SURGERY | Facility: HOSPITAL | Age: 75
End: 2020-09-17
Payer: MEDICARE

## 2020-09-17 ENCOUNTER — HOSPITAL ENCOUNTER (OUTPATIENT)
Facility: HOSPITAL | Age: 75
Discharge: HOME | End: 2020-09-17
Attending: SURGERY | Admitting: SURGERY
Payer: MEDICARE

## 2020-09-17 DIAGNOSIS — Z01.818 PREOP TESTING: ICD-10-CM

## 2020-09-17 DIAGNOSIS — K80.50 BILIARY COLIC: Primary | ICD-10-CM

## 2020-09-17 LAB
ALBUMIN SERPL BCP-MCNC: 3.9 G/DL (ref 3.5–5.2)
ALP SERPL-CCNC: 51 U/L (ref 55–135)
ALT SERPL W/O P-5'-P-CCNC: 16 U/L (ref 10–44)
ANION GAP SERPL CALC-SCNC: 6 MMOL/L (ref 8–16)
AST SERPL-CCNC: 18 U/L (ref 10–40)
BASOPHILS # BLD AUTO: 0.08 K/UL (ref 0–0.2)
BASOPHILS NFR BLD: 1.2 % (ref 0–1.9)
BILIRUB SERPL-MCNC: 0.3 MG/DL (ref 0.1–1)
BUN SERPL-MCNC: 24 MG/DL (ref 8–23)
CALCIUM SERPL-MCNC: 9.4 MG/DL (ref 8.7–10.5)
CHLORIDE SERPL-SCNC: 108 MMOL/L (ref 95–110)
CO2 SERPL-SCNC: 28 MMOL/L (ref 23–29)
CREAT SERPL-MCNC: 1 MG/DL (ref 0.5–1.4)
DIFFERENTIAL METHOD: ABNORMAL
EOSINOPHIL # BLD AUTO: 0.4 K/UL (ref 0–0.5)
EOSINOPHIL NFR BLD: 5.5 % (ref 0–8)
ERYTHROCYTE [DISTWIDTH] IN BLOOD BY AUTOMATED COUNT: 12.5 % (ref 11.5–14.5)
EST. GFR  (AFRICAN AMERICAN): >60 ML/MIN/1.73 M^2
EST. GFR  (NON AFRICAN AMERICAN): 55 ML/MIN/1.73 M^2
GLUCOSE SERPL-MCNC: 97 MG/DL (ref 70–110)
HCT VFR BLD AUTO: 38 % (ref 37–48.5)
HGB BLD-MCNC: 11.8 G/DL (ref 12–16)
IMM GRANULOCYTES # BLD AUTO: 0.02 K/UL (ref 0–0.04)
IMM GRANULOCYTES NFR BLD AUTO: 0.3 % (ref 0–0.5)
LYMPHOCYTES # BLD AUTO: 2.2 K/UL (ref 1–4.8)
LYMPHOCYTES NFR BLD: 34 % (ref 18–48)
MCH RBC QN AUTO: 28.7 PG (ref 27–31)
MCHC RBC AUTO-ENTMCNC: 31.1 G/DL (ref 32–36)
MCV RBC AUTO: 93 FL (ref 82–98)
MONOCYTES # BLD AUTO: 0.4 K/UL (ref 0.3–1)
MONOCYTES NFR BLD: 5.8 % (ref 4–15)
NEUTROPHILS # BLD AUTO: 3.5 K/UL (ref 1.8–7.7)
NEUTROPHILS NFR BLD: 53.2 % (ref 38–73)
NRBC BLD-RTO: 0 /100 WBC
PLATELET # BLD AUTO: 299 K/UL (ref 150–350)
PMV BLD AUTO: 9 FL (ref 9.2–12.9)
POTASSIUM SERPL-SCNC: 4.9 MMOL/L (ref 3.5–5.1)
PROT SERPL-MCNC: 7.5 G/DL (ref 6–8.4)
RBC # BLD AUTO: 4.11 M/UL (ref 4–5.4)
SODIUM SERPL-SCNC: 142 MMOL/L (ref 136–145)
WBC # BLD AUTO: 6.59 K/UL (ref 3.9–12.7)

## 2020-09-17 PROCEDURE — D9220A PRA ANESTHESIA: ICD-10-PCS | Mod: CRNA,,, | Performed by: NURSE ANESTHETIST, CERTIFIED REGISTERED

## 2020-09-17 PROCEDURE — 88304 PR  SURG PATH,LEVEL III: ICD-10-PCS | Mod: 26,,, | Performed by: PATHOLOGY

## 2020-09-17 PROCEDURE — 63600175 PHARM REV CODE 636 W HCPCS: Performed by: SURGERY

## 2020-09-17 PROCEDURE — 88304 TISSUE EXAM BY PATHOLOGIST: CPT | Mod: 26,,, | Performed by: PATHOLOGY

## 2020-09-17 PROCEDURE — 25000003 PHARM REV CODE 250: Performed by: NURSE ANESTHETIST, CERTIFIED REGISTERED

## 2020-09-17 PROCEDURE — 27201423 OPTIME MED/SURG SUP & DEVICES STERILE SUPPLY: Performed by: SURGERY

## 2020-09-17 PROCEDURE — 47562 PR LAP,CHOLECYSTECTOMY: ICD-10-PCS | Mod: ,,, | Performed by: SURGERY

## 2020-09-17 PROCEDURE — 71000033 HC RECOVERY, INTIAL HOUR: Performed by: SURGERY

## 2020-09-17 PROCEDURE — D9220A PRA ANESTHESIA: ICD-10-PCS | Mod: ANES,,, | Performed by: ANESTHESIOLOGY

## 2020-09-17 PROCEDURE — 88304 TISSUE EXAM BY PATHOLOGIST: CPT | Performed by: PATHOLOGY

## 2020-09-17 PROCEDURE — 25000003 PHARM REV CODE 250: Performed by: ANESTHESIOLOGY

## 2020-09-17 PROCEDURE — D9220A PRA ANESTHESIA: Mod: ANES,,, | Performed by: ANESTHESIOLOGY

## 2020-09-17 PROCEDURE — 37000009 HC ANESTHESIA EA ADD 15 MINS: Performed by: SURGERY

## 2020-09-17 PROCEDURE — 71000039 HC RECOVERY, EACH ADD'L HOUR: Performed by: SURGERY

## 2020-09-17 PROCEDURE — 99900104 DSU ONLY-NO CHARGE-EA ADD'L HR (STAT): Performed by: SURGERY

## 2020-09-17 PROCEDURE — 99900103 DSU ONLY-NO CHARGE-INITIAL HR (STAT): Performed by: SURGERY

## 2020-09-17 PROCEDURE — D9220A PRA ANESTHESIA: Mod: CRNA,,, | Performed by: NURSE ANESTHETIST, CERTIFIED REGISTERED

## 2020-09-17 PROCEDURE — 36000709 HC OR TIME LEV III EA ADD 15 MIN: Performed by: SURGERY

## 2020-09-17 PROCEDURE — 63600175 PHARM REV CODE 636 W HCPCS: Performed by: NURSE ANESTHETIST, CERTIFIED REGISTERED

## 2020-09-17 PROCEDURE — 85025 COMPLETE CBC W/AUTO DIFF WBC: CPT

## 2020-09-17 PROCEDURE — 37000008 HC ANESTHESIA 1ST 15 MINUTES: Performed by: SURGERY

## 2020-09-17 PROCEDURE — 36415 COLL VENOUS BLD VENIPUNCTURE: CPT

## 2020-09-17 PROCEDURE — 63600175 PHARM REV CODE 636 W HCPCS: Performed by: ANESTHESIOLOGY

## 2020-09-17 PROCEDURE — 80053 COMPREHEN METABOLIC PANEL: CPT

## 2020-09-17 PROCEDURE — 25000003 PHARM REV CODE 250: Performed by: SURGERY

## 2020-09-17 PROCEDURE — 71000015 HC POSTOP RECOV 1ST HR: Performed by: SURGERY

## 2020-09-17 PROCEDURE — 36000708 HC OR TIME LEV III 1ST 15 MIN: Performed by: SURGERY

## 2020-09-17 PROCEDURE — 47562 LAPAROSCOPIC CHOLECYSTECTOMY: CPT | Mod: ,,, | Performed by: SURGERY

## 2020-09-17 RX ORDER — LIDOCAINE HYDROCHLORIDE 10 MG/ML
1 INJECTION, SOLUTION EPIDURAL; INFILTRATION; INTRACAUDAL; PERINEURAL ONCE
Status: COMPLETED | OUTPATIENT
Start: 2020-09-17 | End: 2020-09-17

## 2020-09-17 RX ORDER — BUPIVACAINE HYDROCHLORIDE AND EPINEPHRINE 2.5; 5 MG/ML; UG/ML
INJECTION, SOLUTION EPIDURAL; INFILTRATION; INTRACAUDAL; PERINEURAL
Status: DISCONTINUED | OUTPATIENT
Start: 2020-09-17 | End: 2020-09-17 | Stop reason: HOSPADM

## 2020-09-17 RX ORDER — ACETAMINOPHEN 10 MG/ML
INJECTION, SOLUTION INTRAVENOUS
Status: DISCONTINUED | OUTPATIENT
Start: 2020-09-17 | End: 2020-09-17

## 2020-09-17 RX ORDER — ONDANSETRON HYDROCHLORIDE 2 MG/ML
INJECTION, SOLUTION INTRAMUSCULAR; INTRAVENOUS
Status: DISCONTINUED | OUTPATIENT
Start: 2020-09-17 | End: 2020-09-17

## 2020-09-17 RX ORDER — OXYCODONE HYDROCHLORIDE 5 MG/1
5 TABLET ORAL ONCE AS NEEDED
Status: COMPLETED | OUTPATIENT
Start: 2020-09-17 | End: 2020-09-17

## 2020-09-17 RX ORDER — PHENYLEPHRINE HYDROCHLORIDE 10 MG/ML
INJECTION INTRAVENOUS
Status: DISCONTINUED | OUTPATIENT
Start: 2020-09-17 | End: 2020-09-17

## 2020-09-17 RX ORDER — ONDANSETRON 2 MG/ML
4 INJECTION INTRAMUSCULAR; INTRAVENOUS ONCE AS NEEDED
Status: DISCONTINUED | OUTPATIENT
Start: 2020-09-17 | End: 2020-09-17 | Stop reason: HOSPADM

## 2020-09-17 RX ORDER — SODIUM CHLORIDE, SODIUM LACTATE, POTASSIUM CHLORIDE, CALCIUM CHLORIDE 600; 310; 30; 20 MG/100ML; MG/100ML; MG/100ML; MG/100ML
INJECTION, SOLUTION INTRAVENOUS CONTINUOUS
Status: DISCONTINUED | OUTPATIENT
Start: 2020-09-17 | End: 2022-03-22

## 2020-09-17 RX ORDER — ROCURONIUM BROMIDE 10 MG/ML
INJECTION, SOLUTION INTRAVENOUS
Status: DISCONTINUED | OUTPATIENT
Start: 2020-09-17 | End: 2020-09-17

## 2020-09-17 RX ORDER — CEFAZOLIN SODIUM 2 G/50ML
2 SOLUTION INTRAVENOUS
Status: COMPLETED | OUTPATIENT
Start: 2020-09-17 | End: 2020-09-17

## 2020-09-17 RX ORDER — FENTANYL CITRATE 50 UG/ML
INJECTION, SOLUTION INTRAMUSCULAR; INTRAVENOUS
Status: DISCONTINUED | OUTPATIENT
Start: 2020-09-17 | End: 2020-09-17

## 2020-09-17 RX ORDER — PROPOFOL 10 MG/ML
VIAL (ML) INTRAVENOUS
Status: DISCONTINUED | OUTPATIENT
Start: 2020-09-17 | End: 2020-09-17

## 2020-09-17 RX ORDER — NEOSTIGMINE METHYLSULFATE 1 MG/ML
INJECTION, SOLUTION INTRAVENOUS
Status: DISCONTINUED | OUTPATIENT
Start: 2020-09-17 | End: 2020-09-17

## 2020-09-17 RX ORDER — FENTANYL CITRATE 50 UG/ML
25 INJECTION, SOLUTION INTRAMUSCULAR; INTRAVENOUS EVERY 5 MIN PRN
Status: DISCONTINUED | OUTPATIENT
Start: 2020-09-17 | End: 2020-09-17 | Stop reason: HOSPADM

## 2020-09-17 RX ORDER — DEXAMETHASONE SODIUM PHOSPHATE 4 MG/ML
INJECTION, SOLUTION INTRA-ARTICULAR; INTRALESIONAL; INTRAMUSCULAR; INTRAVENOUS; SOFT TISSUE
Status: DISCONTINUED | OUTPATIENT
Start: 2020-09-17 | End: 2020-09-17

## 2020-09-17 RX ORDER — HYDROCODONE BITARTRATE AND ACETAMINOPHEN 5; 325 MG/1; MG/1
1 TABLET ORAL EVERY 6 HOURS PRN
Qty: 15 TABLET | Refills: 0 | Status: SHIPPED | OUTPATIENT
Start: 2020-09-17 | End: 2020-12-09

## 2020-09-17 RX ORDER — SODIUM CHLORIDE 9 MG/ML
INJECTION, SOLUTION INTRAVENOUS CONTINUOUS
Status: DISCONTINUED | OUTPATIENT
Start: 2020-09-17 | End: 2020-09-17 | Stop reason: HOSPADM

## 2020-09-17 RX ORDER — LIDOCAINE HCL/PF 100 MG/5ML
SYRINGE (ML) INTRAVENOUS
Status: DISCONTINUED | OUTPATIENT
Start: 2020-09-17 | End: 2020-09-17

## 2020-09-17 RX ORDER — MIDAZOLAM HYDROCHLORIDE 1 MG/ML
INJECTION, SOLUTION INTRAMUSCULAR; INTRAVENOUS
Status: DISCONTINUED | OUTPATIENT
Start: 2020-09-17 | End: 2020-09-17

## 2020-09-17 RX ADMIN — OXYCODONE HYDROCHLORIDE 5 MG: 5 TABLET ORAL at 12:09

## 2020-09-17 RX ADMIN — FENTANYL CITRATE 25 MCG: 50 INJECTION, SOLUTION INTRAMUSCULAR; INTRAVENOUS at 01:09

## 2020-09-17 RX ADMIN — SODIUM CHLORIDE, SODIUM GLUCONATE, SODIUM ACETATE, POTASSIUM CHLORIDE, MAGNESIUM CHLORIDE, SODIUM PHOSPHATE, DIBASIC, AND POTASSIUM PHOSPHATE: .53; .5; .37; .037; .03; .012; .00082 INJECTION, SOLUTION INTRAVENOUS at 11:09

## 2020-09-17 RX ADMIN — LIDOCAINE HYDROCHLORIDE 10 MG: 10 INJECTION, SOLUTION EPIDURAL; INFILTRATION; INTRACAUDAL; PERINEURAL at 09:09

## 2020-09-17 RX ADMIN — PHENYLEPHRINE HYDROCHLORIDE 100 MCG: 10 INJECTION INTRAVENOUS at 11:09

## 2020-09-17 RX ADMIN — DEXAMETHASONE SODIUM PHOSPHATE 4 MG: 4 INJECTION, SOLUTION INTRA-ARTICULAR; INTRALESIONAL; INTRAMUSCULAR; INTRAVENOUS; SOFT TISSUE at 11:09

## 2020-09-17 RX ADMIN — MIDAZOLAM 2 MG: 1 INJECTION INTRAMUSCULAR; INTRAVENOUS at 11:09

## 2020-09-17 RX ADMIN — FENTANYL CITRATE 100 MCG: 50 INJECTION, SOLUTION INTRAMUSCULAR; INTRAVENOUS at 11:09

## 2020-09-17 RX ADMIN — LIDOCAINE HYDROCHLORIDE 50 MG: 20 INJECTION, SOLUTION INTRAVENOUS at 11:09

## 2020-09-17 RX ADMIN — ROCURONIUM BROMIDE 30 MG: 10 INJECTION, SOLUTION INTRAVENOUS at 11:09

## 2020-09-17 RX ADMIN — PHENYLEPHRINE HYDROCHLORIDE 200 MCG: 10 INJECTION INTRAVENOUS at 11:09

## 2020-09-17 RX ADMIN — GLYCOPYRROLATE 0.4 MG: 0.2 INJECTION, SOLUTION INTRAMUSCULAR; INTRAVITREAL at 12:09

## 2020-09-17 RX ADMIN — ACETAMINOPHEN 1000 MG: 10 INJECTION, SOLUTION INTRAVENOUS at 11:09

## 2020-09-17 RX ADMIN — SODIUM CHLORIDE, SODIUM GLUCONATE, SODIUM ACETATE, POTASSIUM CHLORIDE, MAGNESIUM CHLORIDE, SODIUM PHOSPHATE, DIBASIC, AND POTASSIUM PHOSPHATE: .53; .5; .37; .037; .03; .012; .00082 INJECTION, SOLUTION INTRAVENOUS at 09:09

## 2020-09-17 RX ADMIN — NEOSTIGMINE METHYLSULFATE 3 MG: 1 INJECTION INTRAVENOUS at 12:09

## 2020-09-17 RX ADMIN — GLYCOPYRROLATE 0.2 MG: 0.2 INJECTION, SOLUTION INTRAMUSCULAR; INTRAVITREAL at 11:09

## 2020-09-17 RX ADMIN — PROPOFOL 150 MG: 10 INJECTION, EMULSION INTRAVENOUS at 11:09

## 2020-09-17 RX ADMIN — ONDANSETRON 4 MG: 2 INJECTION, SOLUTION INTRAMUSCULAR; INTRAVENOUS at 11:09

## 2020-09-17 RX ADMIN — FENTANYL CITRATE 25 MCG: 50 INJECTION, SOLUTION INTRAMUSCULAR; INTRAVENOUS at 12:09

## 2020-09-17 RX ADMIN — CEFAZOLIN SODIUM 2 G: 2 SOLUTION INTRAVENOUS at 11:09

## 2020-09-17 NOTE — PLAN OF CARE
Pt transferred to phase II. VSS, pain improved after pain meds given, lap site dressings x 3 to abdomen cdi, tolerated clear liquids without N/V, due to void. Report given to DAYA Claudio.

## 2020-09-17 NOTE — INTERVAL H&P NOTE
The patient has been examined and the H&P has been reviewed:    I concur with the findings and no changes have occurred since H&P was written.    Surgery risks, benefits and alternative options discussed and understood by patient/family.          Active Hospital Problems    Diagnosis  POA    Biliary colic [K80.50]  Yes      Resolved Hospital Problems   No resolved problems to display.

## 2020-09-17 NOTE — ANESTHESIA PROCEDURE NOTES
Intubation  Performed by: Jevon Lara CRNA  Authorized by: Alfred Jiménez MD     Intubation:     Induction:  Intravenous    Intubated:  Postinduction    Mask Ventilation:  Easy mask    Attempts:  1    Attempted By:  Student    Method of Intubation:  Direct    Blade:  Ramirez 2    Laryngeal View Grade: Grade I - full view of chords      Difficult Airway Encountered?: No      Complications:  None    Airway Device:  Oral endotracheal tube    Airway Device Size:  7.5    Style/Cuff Inflation:  Cuffed (inflated to minimal occlusive pressure)    Tube secured:  21    Secured at:  The teeth    Placement Verified By:  Capnometry    Complicating Factors:  None    Findings Post-Intubation:  BS equal bilateral and atraumatic/condition of teeth unchanged

## 2020-09-17 NOTE — DISCHARGE INSTRUCTIONS
"Discharge Instructions: After Your Surgery/Procedure  Youve just had surgery. During surgery you were given medicine called anesthesia to keep you relaxed and free of pain. After surgery you may have some pain or nausea. This is common. Here are some tips for feeling better and getting well after surgery.     Stay on schedule with your medication.   Going home  Your doctor or nurse will show you how to take care of yourself when you go home. He or she will also answer your questions. Have an adult family member or friend drive you home.      For your safety we recommend these precaution for the first 24 hours after your procedure:  · Do not drive or use heavy equipment.  · Do not make important decisions or sign legal papers.  · Do not drink alcohol.  · Have someone stay with you, if needed. He or she can watch for problems and help keep you safe.  · Your concentration, balance, coordination, and judgement may be impaired for many hours after anesthesia.  Use caution when ambulating or standing up.     · You may feel weak and "washed out" after anesthesia and surgery.      Subtle residual effects of general anesthesia or sedation with regional / local anesthesia can last more than 24 hours.  Rest for the remainder of the day or longer if your Doctor/Surgeon has advised you to do so.  Although you may feel normal within the first 24 hours, your reflexes and mental ability may be impaired without you realizing it.  You may feel dizzy, lightheaded or sleepy for 24 hours or longer.      Be sure to go to all follow-up visits with your doctor. And rest after your surgery for as long as your doctor tells you to.  Coping with pain  If you have pain after surgery, pain medicine will help you feel better. Take it as told, before pain becomes severe. Also, ask your doctor or pharmacist about other ways to control pain. This might be with heat, ice, or relaxation. And follow any other instructions your surgeon or nurse gives " you.  Tips for taking pain medicine  To get the best relief possible, remember these points:  · Pain medicines can upset your stomach. Taking them with a little food may help.  · Most pain relievers taken by mouth need at least 20 to 30 minutes to start to work.  · Taking medicine on a schedule can help you remember to take it. Try to time your medicine so that you can take it before starting an activity. This might be before you get dressed, go for a walk, or sit down for dinner.  · Constipation is a common side effect of pain medicines. Call your doctor before taking any medicines such as laxatives or stool softeners to help ease constipation. Also ask if you should skip any foods. Drinking lots of fluids and eating foods such as fruits and vegetables that are high in fiber can also help. Remember, do not take laxatives unless your surgeon has prescribed them.  · Drinking alcohol and taking pain medicine can cause dizziness and slow your breathing. It can even be deadly. Do not drink alcohol while taking pain medicine.  · Pain medicine can make you react more slowly to things. Do not drive or run machinery while taking pain medicine.  Your health care provider may tell you to take acetaminophen to help ease your pain. Ask him or her how much you are supposed to take each day. Acetaminophen or other pain relievers may interact with your prescription medicines or other over-the-counter (OTC) drugs. Some prescription medicines have acetaminophen and other ingredients. Using both prescription and OTC acetaminophen for pain can cause you to overdose. Read the labels on your OTC medicines with care. This will help you to clearly know the list of ingredients, how much to take, and any warnings. It may also help you not take too much acetaminophen. If you have questions or do not understand the information, ask your pharmacist or health care provider to explain it to you before you take the OTC medicine.  Managing  nausea  Some people have an upset stomach after surgery. This is often because of anesthesia, pain, or pain medicine, or the stress of surgery. These tips will help you handle nausea and eat healthy foods as you get better. If you were on a special food plan before surgery, ask your doctor if you should follow it while you get better. These tips may help:  · Do not push yourself to eat. Your body will tell you when to eat and how much.  · Start off with clear liquids and soup. They are easier to digest.  · Next try semi-solid foods, such as mashed potatoes, applesauce, and gelatin, as you feel ready.  · Slowly move to solid foods. Dont eat fatty, rich, or spicy foods at first.  · Do not force yourself to have 3 large meals a day. Instead eat smaller amounts more often.  · Take pain medicines with a small amount of solid food, such as crackers or toast, to avoid nausea.     Call your surgeon if  · You still have pain an hour after taking medicine. The medicine may not be strong enough.  · You feel too sleepy, dizzy, or groggy. The medicine may be too strong.  · You have side effects like nausea, vomiting, or skin changes, such as rash, itching, or hives.       If you have obstructive sleep apnea  You were given anesthesia medicine during surgery to keep you comfortable and free of pain. After surgery, you may have more apnea spells because of this medicine and other medicines you were given. The spells may last longer than usual.   At home:  · Keep using the continuous positive airway pressure (CPAP) device when you sleep. Unless your health care provider tells you not to, use it when you sleep, day or night. CPAP is a common device used to treat obstructive sleep apnea.  · Talk with your provider before taking any pain medicine, muscle relaxants, or sedatives. Your provider will tell you about the possible dangers of taking these medicines.  © 3620-9567 [employer]. [address] [place] [address]  [place] [address]. All rights reserved. This information is not intended as a substitute for professional medical care. Always follow your healthcare professional's instructions.    Post op instructions for prevention of DVT  What is deep vein thrombosis?  Deep vein thrombosis (DVT) is the medical term for blood clots in the deep veins of the leg.  These blood clots can be dangerous.  A DVT can block a blood vessel and keep blood from getting where it needs to go.  Another problem is that the clot can travel to other parts of the body such as the lungs.  A clot that travels to the lungs is called a pulmonary embolus (PE) and can cause serious problems with breathing which can lead to death.  Am I at risk for DVT/PE?  If you are not very active, you are at risk of DVT.  Anyone confined to bed, sitting for long periods of time, recovering from surgery, etc. increases the risk of DVT.  Other risk factors are cancer diagnosis, certain medications, estrogen replacement in any form,older age, obesity, pregnancy, smoking, history of clotting disorders, and dehydration.  How will I know if I have a DVT?   Swelling in the lower leg   Pain   Warmth, redness, hardness or bulging of the vein  If you have any of these symptoms, call your doctors office right away.  Some people will not have any symptoms until the clot moves to the lungs.  What are the symptoms of a PE?   Panting, shortness of breath, or trouble breathing   Sharp, knife-like chest pain when you breathe   Coughing or coughing up blood   Rapid heartbeat  If you have any of these symptoms or get worse quickly, call 911 for emergency treatment.  How can I prevent a DVT?   Avoid long periods of inactivity and dont cross your legs--get up and walk around every hour or so.   Stay active--walking after surgery is highly encouraged.  This means you should get out of the house and walk in the neighborhood.  Going up and down stairs will not impair healing (unless advised  against such activity by your doctor).     Drink plenty of noncaffeinated, nonalcoholic fluids each day to prevent dehydration.   Wear special support stockings, if they have been advised by your doctor.   If you travel, stop at least once an hour and walk around.   Avoid smoking (assistance with stopping is available through your healthcare provider)  Always notify your doctor if you are not able to follow the post operative instructions that are given to you at the time of discharge.  It may be necessary to prescribe one of the medications available to prevent DVT.  We hope your stay was comfortable as you heal now, mend and rest.    For we have enjoyed taking care of you by giving your our best.    And as you get better, by regaining your health and strength;   We count it as a privilege to have served you and hope your time at [facility] was well spent.      Thank  You!!!

## 2020-09-17 NOTE — OP NOTE
PATIENT NAME:  Erica Masterson     DATE OF PROCEDURE: 9/17/2020    PROCEDURE: Laparoscopic Cholecystectomy    PREOPERATIVE DIAGNOSIS: Cholelithiasis / Cholecystitis   POSTOPERATIVE DIAGNOSIS: Cholelithiasis / Cholecystitis     SURGEON: Forrest Canela Jr., M.D.  Resident: Rei Patterson     DESCRIPTION OF PROCEDURE: After appropriate consent was obtained, consent forms   signed and questions answered, the patient was taken to the Operating Room and   placed on the operating room table where general endotracheal anesthesia was   induced without difficulty. Preoperative antibiotics were administered. SCDs were in   place. A Timeout procedure was performed. The abdomen was prepped and draped in the usual sterile fashion. A midline incision was made beneath the umbilicus. Dissection was performed down to the fascia, which was incised. Stay sutures were placed on the fascia and the Kelvin trocar was inserted. The abdomen was insufflated. Under direct   vision and after injection with local anesthetic, a 5 mm trocar was placed in   the upper midline. A second 5 mm trocar was placed between these two. The   gallbladder was identified, grasped, and retracted. There was some mild to moderate  inflammation. The cystic duct was identified and carefully dissected. Three clips   were placed on the common duct side, 2 on the gallbladder side, and the duct was   transected. The artery was identified, dissected, clipped and cut as well. The   gallbladder was then dissected free of the liver bed with electrocautery. It was   placed in a retrieval bag and removed through the umbilical port site. No bile   was spilled. The gallbladder fossa was examined and found to be hemostatic. The trocars were then removed under direct vision.The abdomen was desufflated. The fascia at the umbilicus was closed with interrupted 0 Vicryl suture and additional local was injected. The skin was then closed with 4-0 Monocryl subcuticular stitches.  Steri-Strips were then applied to all incisions. The patient was awakened, extubated and transferred to the Recovery Room in good condition. The patient tolerated the procedure without complication. Lap and instrument counts were reported as correct at the termination of the procedure.    EBL: 15 cc  SPECIMEN: gallbladder  COMPLICATIONS: none  ANESTHESIA: General

## 2020-09-17 NOTE — PLAN OF CARE
Pt states that pain is tolerable and denies nausea, tolerating po.  Ambulated to bathroom with steady gait and voided qs without difficulty.  States that she is ready for discharge

## 2020-09-17 NOTE — DISCHARGE SUMMARY
Discharge Summary  General Surgery      Admit Date: 9/17/2020    Discharge Date :9/17/2020    Attending Physician: Forrest Veronica     Discharge Physician: Forrest Veronica    Discharged Condition: good    Discharge Diagnosis: Biliary colic [K80.50]    Treatments/Procedures: Procedure(s) (LRB):  CHOLECYSTECTOMY, LAPAROSCOPIC (N/A)    Hospital Course: Uneventful; Discharged home from Recovery    Significant Diagnostic Studies: none    Disposition: Home or Self Care    Diet: Regular    Follow up: Office 10-14 days    Activity: No heavy lifting till seen in office.    Patient Instructions:   Current Discharge Medication List      START taking these medications    Details   HYDROcodone-acetaminophen (NORCO) 5-325 mg per tablet Take 1 tablet by mouth every 6 (six) hours as needed for Pain.  Qty: 15 tablet, Refills: 0    Comments: Quantity prescribed more than 7 day supply? No         CONTINUE these medications which have NOT CHANGED    Details   amLODIPine (NORVASC) 2.5 MG tablet Take 1 tablet (2.5 mg total) by mouth once daily.  Qty: 30 tablet, Refills: 11    Comments: .  Associated Diagnoses: Hypertension associated with diabetes      blood sugar diagnostic (BLOOD GLUCOSE TEST) Strp True Metrix glucose strips check once daily  Qty: 100 each, Refills: 3      blood-glucose meter kit True Metrix glucometer check glucose once daily  Qty: 1 each, Refills: 0      cyanocobalamin (VITAMIN B-12) 100 MCG tablet Take 100 mcg by mouth once daily.      cycloSPORINE (RESTASIS) 0.05 % ophthalmic emulsion Place 1 drop into both eyes 2 (two) times daily.  Qty: 60 vial, Refills: 11    Associated Diagnoses: Dry eyes, bilateral      doxepin (SINEQUAN) 10 MG capsule Take 1 capsule (10 mg total) by mouth every evening.  Qty: 30 capsule, Refills: 5    Associated Diagnoses: Irritable bowel syndrome with constipation      JANUVIA 25 mg Tab Take 1 tablet (25 mg total) by mouth once daily.  Qty: 90 tablet, Refills: 3    Associated Diagnoses: Type  2 diabetes mellitus without complication, without long-term current use of insulin      Lactobacillus rhamnosus GG (CULTURELLE) 15 billion cell capsule Take 1 capsule by mouth once daily.      lancets Misc True Metrix lancets check once daily  Qty: 100 each, Refills: 3      lansoprazole (PREVACID) 30 MG capsule Take 30 mg by mouth once daily.      linaCLOtide (LINZESS) 290 mcg Cap capsule Take 1 capsule (290 mcg total) by mouth once daily.  Qty: 90 capsule, Refills: 3      losartan (COZAAR) 100 MG tablet Take 1 tablet (100 mg total) by mouth once daily.  Qty: 90 tablet, Refills: 3    Comments: PHARMACY: note increase to 100mg  Associated Diagnoses: Hypertension, essential      peg 400-hypromellose-glycerin (DRY EYE RELIEF) 1-0.2-0.2 % Drop Apply 1 drop to eye 2 (two) times daily as needed.      psyllium (METAMUCIL) powder Take 1 packet by mouth 2 (two) times daily as needed.      rosuvastatin (CRESTOR) 20 MG tablet Take 1 tablet (20 mg total) by mouth every evening.  Qty: 90 tablet, Refills: 3    Associated Diagnoses: Hyperlipidemia associated with type 2 diabetes mellitus      saliva substitute combo no.9 (BIOTENE DRY MOUTH ORAL RINSE MM) 1 Dose by Mucous Membrane route once daily.      sucralfate (CARAFATE) 1 gram tablet              Discharge Procedure Orders   Diet general

## 2020-09-17 NOTE — TRANSFER OF CARE
"Anesthesia Transfer of Care Note    Patient: Erica Masterson    Procedure(s) Performed: Procedure(s) (LRB):  CHOLECYSTECTOMY, LAPAROSCOPIC (N/A)    Patient location: PACU    Anesthesia Type: general    Transport from OR: Transported from OR on 2-3 L/min O2 by NC with adequate spontaneous ventilation    Post pain: adequate analgesia    Post assessment: no apparent anesthetic complications    Post vital signs: stable    Level of consciousness: responds to stimulation    Nausea/Vomiting: no nausea/vomiting    Complications: none    Transfer of care protocol was followed      Last vitals:   Visit Vitals  BP (!) 122/59 (BP Location: Right arm, Patient Position: Lying)   Pulse 66   Temp 36.4 °C (97.6 °F) (Skin)   Resp 16   Ht 5' 6" (1.676 m)   Wt 69.4 kg (153 lb)   SpO2 99%   Breastfeeding No   BMI 24.69 kg/m²     "

## 2020-09-17 NOTE — ANESTHESIA PREPROCEDURE EVALUATION
09/17/2020  Erica Masterson is a 74 y.o., female.    Pre-op Assessment    I have reviewed the Patient Summary Reports.     I have reviewed the Nursing Notes. I have reviewed the NPO Status.   I have reviewed the Medications.     Review of Systems  Anesthesia Hx:  No problems with previous Anesthesia    Social:  Non-Smoker    Cardiovascular:   Hypertension, well controlled hyperlipidemia    Renal/:   Chronic Renal Disease    Hepatic/GI:   Bowel Prep. GERD, poorly controlled    Musculoskeletal:   Arthritis     Neurological:   Neuromuscular Disease,    Endocrine:   Diabetes    Psych:   Psychiatric History depression          Physical Exam  General:  Well nourished    Airway/Jaw/Neck:  Airway Findings: Mouth Opening: Normal Tongue: Normal  General Airway Assessment: Adult, Good  Mallampati: I       Chest/Lungs:  Chest/Lungs Findings: Clear to auscultation, Normal Respiratory Rate     Heart/Vascular:  Heart Findings: Rate: Normal  Rhythm: Regular Rhythm  Sounds: Normal        Mental Status:  Mental Status Findings:  Alert and Oriented, Cooperative         Anesthesia Plan  Type of Anesthesia, risks & benefits discussed:  Anesthesia Type:  general  Patient's Preference:   Intra-op Monitoring Plan: standard ASA monitors  Intra-op Monitoring Plan Comments:   Post Op Pain Control Plan: multimodal analgesia and IV/PO Opioids PRN  Post Op Pain Control Plan Comments:   Induction:   IV  Beta Blocker:  Patient is not currently on a Beta-Blocker (No further documentation required).       Informed Consent: Patient understands risks and agrees with Anesthesia plan.  Questions answered. Anesthesia consent signed with patient.  ASA Score: 2     Day of Surgery Review of History & Physical: I have interviewed and examined the patient. I have reviewed the patient's H&P dated:  There are no significant changes.          Ready For  Surgery From Anesthesia Perspective.

## 2020-09-17 NOTE — TELEPHONE ENCOUNTER
----- Message from Joanna Carlos sent at 9/17/2020  3:09 PM CDT -----  Type:  Sooner Apoointment Request    Caller is requesting a sooner appointment.  Caller declined first available appointment listed below.  Caller will not accept being placed on the waitlist and is requesting a message be sent to doctor.    Name of Caller:  Patient   When is the first available appointment?  10/13  Symptoms:  Post op follow up  Best Call Back Number:     Additional Information:  Please advise-thank you

## 2020-09-17 NOTE — PLAN OF CARE
Pt. AAOx4, NAD, calm and cooperative, breathing unlabored and even on room air. Denies pain at rest - has right abdominal pain intermittently late evening after eating due to cholecystitis.  at bedside. Prepared for surgery. Gets upset stomach when antibiotics by mouth on empty stomach. Dr. Jiménez, anesthesia aware - no new orders.  Took her prevacid today.  Prepared for surgery.

## 2020-09-18 ENCOUNTER — TELEPHONE (OUTPATIENT)
Dept: SURGERY | Facility: CLINIC | Age: 75
End: 2020-09-18

## 2020-09-18 VITALS
TEMPERATURE: 98 F | RESPIRATION RATE: 18 BRPM | HEART RATE: 70 BPM | WEIGHT: 153 LBS | SYSTOLIC BLOOD PRESSURE: 130 MMHG | HEIGHT: 66 IN | OXYGEN SATURATION: 98 % | DIASTOLIC BLOOD PRESSURE: 77 MMHG | BODY MASS INDEX: 24.59 KG/M2

## 2020-09-18 NOTE — ANESTHESIA POSTPROCEDURE EVALUATION
Anesthesia Post Evaluation    Patient: Erica Masterson    Procedure(s) Performed: Procedure(s) (LRB):  CHOLECYSTECTOMY, LAPAROSCOPIC (N/A)    Final Anesthesia Type: general    Patient location during evaluation: PACU  Patient participation: Yes- Able to Participate  Level of consciousness: awake and alert  Post-procedure vital signs: reviewed and stable  Pain management: adequate  Airway patency: patent    PONV status at discharge: No PONV  Anesthetic complications: no      Cardiovascular status: hemodynamically stable  Respiratory status: unassisted and room air  Hydration status: euvolemic  Follow-up not needed.          Vitals Value Taken Time   /77 09/17/20 1416   Temp 36.7 °C (98 °F) 09/17/20 1336   Pulse 70 09/17/20 1416   Resp 18 09/17/20 1416   SpO2 98 % 09/17/20 1416         Event Time   Out of Recovery 13:33:00         Pain/Marco Antonio Score: Pain Rating Prior to Med Admin: 6 (9/17/2020  1:15 PM)  Pain Rating Post Med Admin: 5 (9/17/2020  1:30 PM)  Marco Antonio Score: 10 (9/17/2020  2:17 PM)

## 2020-09-18 NOTE — TELEPHONE ENCOUNTER
----- Message from Princess RUFINO Vaca sent at 9/18/2020 12:00 PM CDT -----  Contact: pt  Type: Needs Medical Advice  Who Called: pt  Best Call Back Number: [phone] (home)     Additional Information: requesting a call in regards to getting her discharge information. Please advise

## 2020-09-22 LAB
FINAL PATHOLOGIC DIAGNOSIS: NORMAL
GROSS: NORMAL

## 2020-09-29 ENCOUNTER — OFFICE VISIT (OUTPATIENT)
Dept: SURGERY | Facility: CLINIC | Age: 75
End: 2020-09-29
Payer: MEDICARE

## 2020-09-29 VITALS — BODY MASS INDEX: 24.66 KG/M2 | WEIGHT: 152.75 LBS

## 2020-09-29 DIAGNOSIS — Z98.890 POST-OPERATIVE STATE: Primary | ICD-10-CM

## 2020-09-29 PROCEDURE — 99024 PR POST-OP FOLLOW-UP VISIT: ICD-10-PCS | Mod: S$GLB,,, | Performed by: SURGERY

## 2020-09-29 PROCEDURE — 99024 POSTOP FOLLOW-UP VISIT: CPT | Mod: S$GLB,,, | Performed by: SURGERY

## 2020-09-29 PROCEDURE — 99999 PR PBB SHADOW E&M-EST. PATIENT-LVL III: CPT | Mod: PBBFAC,,, | Performed by: SURGERY

## 2020-09-29 PROCEDURE — 99999 PR PBB SHADOW E&M-EST. PATIENT-LVL III: ICD-10-PCS | Mod: PBBFAC,,, | Performed by: SURGERY

## 2020-10-10 NOTE — PROGRESS NOTES
POST-OP NOTE    PROCEDURE:  Laparoscopic cholecystectomy    COMPLAINTS: None.    EXAM: Incision well approximated. No drainage. No infection.      IMPRESSION:   Component 3wk ago   Final Pathologic Diagnosis Gallbladder (cholecystectomy):   - Benign gallbladder with mild reactive changes        Doing well    PLAN: FU as needed.

## 2020-10-22 DIAGNOSIS — E11.9 TYPE 2 DIABETES MELLITUS WITHOUT COMPLICATION, WITHOUT LONG-TERM CURRENT USE OF INSULIN: ICD-10-CM

## 2020-10-22 DIAGNOSIS — E11.9 CONTROLLED TYPE 2 DIABETES MELLITUS WITHOUT COMPLICATION, WITHOUT LONG-TERM CURRENT USE OF INSULIN: ICD-10-CM

## 2020-10-22 NOTE — TELEPHONE ENCOUNTER
----- Message from Martínez Gary sent at 10/22/2020  8:55 AM CDT -----  Regarding: Pt advice  Contact: PT  Type:  Patient Returning Call    Who Called:  PT  Does the patient know what this is regarding?:  pt would like office to follow up regarding switching back evette Metformin. Stating Jenuvia is too expensive  Best Call Back Number:    Additional Information:  Please return call. Thank you

## 2020-10-22 NOTE — TELEPHONE ENCOUNTER
Previously, she was only taking one metformin a day and said she could not tolerate it.  If she going to be able to stay on it?

## 2020-10-23 RX ORDER — IBUPROFEN 200 MG
CAPSULE ORAL
Qty: 100 EACH | Refills: 3 | Status: SHIPPED | OUTPATIENT
Start: 2020-10-23 | End: 2021-02-02 | Stop reason: SDUPTHER

## 2020-10-23 RX ORDER — METFORMIN HYDROCHLORIDE 500 MG/1
500 TABLET, EXTENDED RELEASE ORAL DAILY
Qty: 90 TABLET | Refills: 1 | Status: SHIPPED | OUTPATIENT
Start: 2020-10-23 | End: 2020-11-02 | Stop reason: SINTOL

## 2020-10-23 NOTE — TELEPHONE ENCOUNTER
No new care gaps identified.  Powered by [employer]. Reference number: [id number]. 10/23/2020 10:38:43 AM NILESH

## 2020-10-23 NOTE — TELEPHONE ENCOUNTER
"Patient stated that "yes" she can stay on metformin this time as she is still having the same GI upset as from when she was on the metfromin.    She would also like a refill for her blood glucose strips. It needs to be printed and sent to [facility] for approval. Please advise.   "

## 2020-10-27 ENCOUNTER — TELEPHONE (OUTPATIENT)
Dept: FAMILY MEDICINE | Facility: CLINIC | Age: 75
End: 2020-10-27

## 2020-10-30 ENCOUNTER — TELEPHONE (OUTPATIENT)
Dept: FAMILY MEDICINE | Facility: CLINIC | Age: 75
End: 2020-10-30

## 2020-10-30 ENCOUNTER — LAB VISIT (OUTPATIENT)
Dept: LAB | Facility: HOSPITAL | Age: 75
End: 2020-10-30
Attending: FAMILY MEDICINE
Payer: MEDICARE

## 2020-10-30 DIAGNOSIS — E11.9 TYPE 2 DIABETES MELLITUS WITHOUT COMPLICATION, WITHOUT LONG-TERM CURRENT USE OF INSULIN: ICD-10-CM

## 2020-10-30 DIAGNOSIS — E78.5 HYPERLIPIDEMIA ASSOCIATED WITH TYPE 2 DIABETES MELLITUS: ICD-10-CM

## 2020-10-30 DIAGNOSIS — E11.69 HYPERLIPIDEMIA ASSOCIATED WITH TYPE 2 DIABETES MELLITUS: ICD-10-CM

## 2020-10-30 DIAGNOSIS — I15.2 HYPERTENSION ASSOCIATED WITH DIABETES: ICD-10-CM

## 2020-10-30 DIAGNOSIS — E11.59 HYPERTENSION ASSOCIATED WITH DIABETES: ICD-10-CM

## 2020-10-30 LAB
ALBUMIN SERPL BCP-MCNC: 4 G/DL (ref 3.5–5.2)
ALP SERPL-CCNC: 53 U/L (ref 55–135)
ALT SERPL W/O P-5'-P-CCNC: 13 U/L (ref 10–44)
ANION GAP SERPL CALC-SCNC: 5 MMOL/L (ref 8–16)
AST SERPL-CCNC: 16 U/L (ref 10–40)
BILIRUB SERPL-MCNC: 0.7 MG/DL (ref 0.1–1)
BUN SERPL-MCNC: 14 MG/DL (ref 8–23)
CALCIUM SERPL-MCNC: 9.3 MG/DL (ref 8.7–10.5)
CHLORIDE SERPL-SCNC: 103 MMOL/L (ref 95–110)
CHOLEST SERPL-MCNC: 163 MG/DL (ref 120–199)
CHOLEST/HDLC SERPL: 2.6 {RATIO} (ref 2–5)
CO2 SERPL-SCNC: 31 MMOL/L (ref 23–29)
CREAT SERPL-MCNC: 0.9 MG/DL (ref 0.5–1.4)
EST. GFR  (AFRICAN AMERICAN): >60 ML/MIN/1.73 M^2
EST. GFR  (NON AFRICAN AMERICAN): >60 ML/MIN/1.73 M^2
ESTIMATED AVG GLUCOSE: 120 MG/DL (ref 68–131)
GLUCOSE SERPL-MCNC: 98 MG/DL (ref 70–110)
HBA1C MFR BLD HPLC: 5.8 % (ref 4–5.6)
HDLC SERPL-MCNC: 63 MG/DL (ref 40–75)
HDLC SERPL: 38.7 % (ref 20–50)
LDLC SERPL CALC-MCNC: 91.6 MG/DL (ref 63–159)
NONHDLC SERPL-MCNC: 100 MG/DL
POTASSIUM SERPL-SCNC: 4.2 MMOL/L (ref 3.5–5.1)
PROT SERPL-MCNC: 7.3 G/DL (ref 6–8.4)
SODIUM SERPL-SCNC: 139 MMOL/L (ref 136–145)
TRIGL SERPL-MCNC: 42 MG/DL (ref 30–150)

## 2020-10-30 PROCEDURE — 83036 HEMOGLOBIN GLYCOSYLATED A1C: CPT

## 2020-10-30 PROCEDURE — 36415 COLL VENOUS BLD VENIPUNCTURE: CPT | Mod: PO

## 2020-10-30 PROCEDURE — 80061 LIPID PANEL: CPT

## 2020-10-30 PROCEDURE — 80053 COMPREHEN METABOLIC PANEL: CPT

## 2020-10-30 NOTE — TELEPHONE ENCOUNTER
----- Message from Mathew Jenkins sent at 10/30/2020  3:36 PM CDT -----  Contact: patient//  [phone]  TODD RODRIGUEZ calling requesting to speak with you regarding mailing her lab results also she would like results sent to Dr Boykin fax# [phone]

## 2020-11-02 RX ORDER — SITAGLIPTIN 25 MG/1
25 TABLET, FILM COATED ORAL DAILY
Qty: 90 TABLET | Refills: 1 | Status: SHIPPED | OUTPATIENT
Start: 2020-11-02 | End: 2020-11-05

## 2020-11-04 ENCOUNTER — TELEPHONE (OUTPATIENT)
Dept: FAMILY MEDICINE | Facility: CLINIC | Age: 75
End: 2020-11-04

## 2020-11-05 RX ORDER — METFORMIN HYDROCHLORIDE 500 MG/1
500 TABLET, EXTENDED RELEASE ORAL DAILY
COMMUNITY
End: 2021-01-22 | Stop reason: DRUGHIGH

## 2020-11-05 NOTE — TELEPHONE ENCOUNTER
She was just given a 90 day supply with a refill.  Use up what she has and make sure she can tolerate it and we can refill it further.

## 2020-11-05 NOTE — TELEPHONE ENCOUNTER
----- Message from Jessica Keen MA sent at 11/4/2020  2:06 PM CST -----  Called patient to find out if she had metformin to take on hand. Pt confirmed and states that she has about a 2 month supply left. Pt assured me that she is sure metformin will be her final choice.

## 2020-11-17 ENCOUNTER — PATIENT OUTREACH (OUTPATIENT)
Dept: ADMINISTRATIVE | Facility: HOSPITAL | Age: 75
End: 2020-11-17

## 2020-12-07 ENCOUNTER — TELEPHONE (OUTPATIENT)
Dept: FAMILY MEDICINE | Facility: CLINIC | Age: 75
End: 2020-12-07

## 2020-12-07 NOTE — TELEPHONE ENCOUNTER
Patient already made appt to change to Dr. Robles cardiologist- has appt 1/26/21 to get cleared to have upper gi with Dr. Monroy.

## 2020-12-07 NOTE — TELEPHONE ENCOUNTER
----- Message from Rowena  sent at 12/7/2020 10:03 AM CST -----  Regarding: clearance  Contact: pt  Type: Needs Medical Advice    Who Called:      Best Call Back Number:  cell and home     Additional Information: Requesting a call back from Nurse, regarding pt needs a Clearance for UPPER GI and she was not able to get in with CARDIO until next month ,please call back to answer questions

## 2020-12-09 ENCOUNTER — OFFICE VISIT (OUTPATIENT)
Dept: FAMILY MEDICINE | Facility: CLINIC | Age: 75
End: 2020-12-09
Payer: MEDICARE

## 2020-12-09 ENCOUNTER — PATIENT OUTREACH (OUTPATIENT)
Dept: ADMINISTRATIVE | Facility: HOSPITAL | Age: 75
End: 2020-12-09

## 2020-12-09 VITALS
HEIGHT: 66 IN | SYSTOLIC BLOOD PRESSURE: 110 MMHG | OXYGEN SATURATION: 99 % | BODY MASS INDEX: 24.45 KG/M2 | TEMPERATURE: 97 F | HEART RATE: 85 BPM | DIASTOLIC BLOOD PRESSURE: 66 MMHG | WEIGHT: 152.13 LBS

## 2020-12-09 DIAGNOSIS — E11.9 TYPE 2 DIABETES MELLITUS WITHOUT COMPLICATION, WITHOUT LONG-TERM CURRENT USE OF INSULIN: ICD-10-CM

## 2020-12-09 DIAGNOSIS — N39.0 URINARY TRACT INFECTION WITH HEMATURIA, SITE UNSPECIFIED: Primary | ICD-10-CM

## 2020-12-09 DIAGNOSIS — E11.69 HYPERLIPIDEMIA ASSOCIATED WITH TYPE 2 DIABETES MELLITUS: ICD-10-CM

## 2020-12-09 DIAGNOSIS — R31.9 URINARY TRACT INFECTION WITH HEMATURIA, SITE UNSPECIFIED: Primary | ICD-10-CM

## 2020-12-09 DIAGNOSIS — E78.5 HYPERLIPIDEMIA ASSOCIATED WITH TYPE 2 DIABETES MELLITUS: ICD-10-CM

## 2020-12-09 DIAGNOSIS — I15.2 HYPERTENSION ASSOCIATED WITH DIABETES: ICD-10-CM

## 2020-12-09 DIAGNOSIS — E11.59 HYPERTENSION ASSOCIATED WITH DIABETES: ICD-10-CM

## 2020-12-09 LAB
BILIRUB SERPL-MCNC: NEGATIVE MG/DL
BLOOD URINE, POC: ABNORMAL
CLARITY, POC UA: CLEAR
COLOR, POC UA: YELLOW
GLUCOSE UR QL STRIP: NORMAL
KETONES UR QL STRIP: NEGATIVE
LEUKOCYTE ESTERASE URINE, POC: POSITIVE
NITRITE, POC UA: NEGATIVE
PH, POC UA: 5
PROTEIN, POC: ABNORMAL
SPECIFIC GRAVITY, POC UA: 1
UROBILINOGEN, POC UA: NORMAL

## 2020-12-09 PROCEDURE — 1159F MED LIST DOCD IN RCRD: CPT | Mod: S$GLB,,, | Performed by: NURSE PRACTITIONER

## 2020-12-09 PROCEDURE — 3288F FALL RISK ASSESSMENT DOCD: CPT | Mod: CPTII,S$GLB,, | Performed by: NURSE PRACTITIONER

## 2020-12-09 PROCEDURE — 99214 OFFICE O/P EST MOD 30 MIN: CPT | Mod: 25,S$GLB,, | Performed by: NURSE PRACTITIONER

## 2020-12-09 PROCEDURE — 1101F PR PT FALLS ASSESS DOC 0-1 FALLS W/OUT INJ PAST YR: ICD-10-PCS | Mod: CPTII,S$GLB,, | Performed by: NURSE PRACTITIONER

## 2020-12-09 PROCEDURE — 3288F PR FALLS RISK ASSESSMENT DOCUMENTED: ICD-10-PCS | Mod: CPTII,S$GLB,, | Performed by: NURSE PRACTITIONER

## 2020-12-09 PROCEDURE — 81002 POCT URINE DIPSTICK WITHOUT MICROSCOPE: ICD-10-PCS | Mod: S$GLB,,, | Performed by: NURSE PRACTITIONER

## 2020-12-09 PROCEDURE — 3078F DIAST BP <80 MM HG: CPT | Mod: CPTII,S$GLB,, | Performed by: NURSE PRACTITIONER

## 2020-12-09 PROCEDURE — 1126F PR PAIN SEVERITY QUANTIFIED, NO PAIN PRESENT: ICD-10-PCS | Mod: S$GLB,,, | Performed by: NURSE PRACTITIONER

## 2020-12-09 PROCEDURE — 99999 PR PBB SHADOW E&M-EST. PATIENT-LVL V: CPT | Mod: PBBFAC,,, | Performed by: NURSE PRACTITIONER

## 2020-12-09 PROCEDURE — 87086 URINE CULTURE/COLONY COUNT: CPT

## 2020-12-09 PROCEDURE — 1126F AMNT PAIN NOTED NONE PRSNT: CPT | Mod: S$GLB,,, | Performed by: NURSE PRACTITIONER

## 2020-12-09 PROCEDURE — 81001 URINALYSIS AUTO W/SCOPE: CPT

## 2020-12-09 PROCEDURE — 1101F PT FALLS ASSESS-DOCD LE1/YR: CPT | Mod: CPTII,S$GLB,, | Performed by: NURSE PRACTITIONER

## 2020-12-09 PROCEDURE — 1170F PR FUNCTIONAL STATUS ASSESSED: ICD-10-PCS | Mod: S$GLB,,, | Performed by: NURSE PRACTITIONER

## 2020-12-09 PROCEDURE — 3074F PR MOST RECENT SYSTOLIC BLOOD PRESSURE < 130 MM HG: ICD-10-PCS | Mod: CPTII,S$GLB,, | Performed by: NURSE PRACTITIONER

## 2020-12-09 PROCEDURE — 81002 URINALYSIS NONAUTO W/O SCOPE: CPT | Mod: S$GLB,,, | Performed by: NURSE PRACTITIONER

## 2020-12-09 PROCEDURE — 99214 PR OFFICE/OUTPT VISIT, EST, LEVL IV, 30-39 MIN: ICD-10-PCS | Mod: 25,S$GLB,, | Performed by: NURSE PRACTITIONER

## 2020-12-09 PROCEDURE — 99999 PR PBB SHADOW E&M-EST. PATIENT-LVL V: ICD-10-PCS | Mod: PBBFAC,,, | Performed by: NURSE PRACTITIONER

## 2020-12-09 PROCEDURE — 3074F SYST BP LT 130 MM HG: CPT | Mod: CPTII,S$GLB,, | Performed by: NURSE PRACTITIONER

## 2020-12-09 PROCEDURE — 1170F FXNL STATUS ASSESSED: CPT | Mod: S$GLB,,, | Performed by: NURSE PRACTITIONER

## 2020-12-09 PROCEDURE — 3078F PR MOST RECENT DIASTOLIC BLOOD PRESSURE < 80 MM HG: ICD-10-PCS | Mod: CPTII,S$GLB,, | Performed by: NURSE PRACTITIONER

## 2020-12-09 PROCEDURE — 1159F PR MEDICATION LIST DOCUMENTED IN MEDICAL RECORD: ICD-10-PCS | Mod: S$GLB,,, | Performed by: NURSE PRACTITIONER

## 2020-12-09 NOTE — PROGRESS NOTES
Patient ID: Erica Masterson is a 75 y.o. female.    Chief Complaint:   Weight Loss    HPI   Ms. Masterson presents to clinic with concerns of weight loss over the past 8 months. Her last 5 weight show a minor fluctuation of 2 pounds. She denies decreased appetite, nausea, vomiting, and early satiety. She eats 3 meals a day and exercise at least 3 days per week. Patient is requesting a repeat urinalysis and A1C. She states she would like to know if she still has blood in her urine and to determine of she is till diabetic. Last A1C was 10/30/20. Patient notified that her next A1C should not be drawn until 1/30/21. All question sand concerns addressed.    Wt Readings from Last 5 Encounters:   12/09/20 69 kg (152 lb 1.9 oz)   09/29/20 69.3 kg (152 lb 12.5 oz)   09/17/20 69.4 kg (153 lb)   08/21/20 70 kg (154 lb 5.2 oz)   08/18/20 69.4 kg (153 lb)     Patient is new to me. Reviewed past medical and social history.    Past Medical History:   Diagnosis Date    Allergy     Anxiety     Cataract     Colon polyp     Degenerative arthritis of knee 4/3/2012    Diverticulosis     GERD (gastroesophageal reflux disease)     Hyperlipidemia     Hypertension     Multinodular goiter 3/26/2012    Myopathy, unspecified 1/18/2010    New onset type 2 diabetes mellitus 3/24/2020    New onset type 2 diabetes mellitus 3/24/2020    Tuberculosis      Past Surgical History:   Procedure Laterality Date    BREAST BIOPSY Left 2015    benign    COLONOSCOPY  12/2/15    Dr. Britton, multiple polyps, recheck five years-three years if more than 2 polyps are adenomatous    COLONOSCOPY N/A 12/2/2015    COLONOSCOPY N/A 3/20/2019    Procedure: COLONOSCOPY;  Surgeon: Jose Britton MD;  Location: [facility];  Service: Endoscopy;  Laterality: N/A;    COLONOSCOPY N/A 5/20/2020    Dr. Britton; internal hemorrhoids; diverticulosis; polyps removed; repeat in 3 years    CYSTOSCOPY N/A 8/26/2020    Procedure: CYSTOSCOPY;  Surgeon: Charlotte CHAVEZ  Romeo Marie MD;  [facility];  Service: Urology;  Laterality: N/A;    ESOPHAGOGASTRODUODENOSCOPY N/A 5/20/2020    Dr. Britton; small hiatal hernia; gastritis; 8 gastric polyps removed    FOOT SURGERY      HYSTERECTOMY      INTRALUMINAL GASTROINTESTINAL TRACT IMAGING VIA CAPSULE N/A 6/4/2020    Procedure: IMAGING PROCEDURE, GI TRACT, INTRALUMINAL, VIA CAPSULE;  Surgeon: Jose Britton MD;  Location: [facility];  Service: Endoscopy;  Laterality: N/A;    KNEE SURGERY  06/06/2013    right knee tear Dr Iverson     LAPAROSCOPIC CHOLECYSTECTOMY N/A 9/17/2020    Procedure: CHOLECYSTECTOMY, LAPAROSCOPIC;  Surgeon: Forrest Veronica MD;  [facility]: [facility] OR;  Service: General;  Laterality: N/A;    LUNG LOBECTOMY  1966    right middle lobectomy, due to Tb    OOPHORECTOMY      SHOULDER SURGERY      francis      Current Outpatient Medications on File Prior to Visit   Medication Sig Dispense Refill    amLODIPine (NORVASC) 2.5 MG tablet Take 1 tablet (2.5 mg total) by mouth once daily. 30 tablet 11    blood sugar diagnostic (BLOOD GLUCOSE TEST) Strp True Metrix glucose strips check once daily 100 each 3    blood-glucose meter kit True Metrix glucometer check glucose once daily 1 each 0    cyanocobalamin (VITAMIN B-12) 100 MCG tablet Take 100 mcg by mouth once daily.      cycloSPORINE (RESTASIS) 0.05 % ophthalmic emulsion Place 1 drop into both eyes 2 (two) times daily. 60 vial 11    doxepin (SINEQUAN) 10 MG capsule Take 1 capsule (10 mg total) by mouth every evening. 30 capsule 5    lancets Misc True Metrix lancets check once daily 100 each 3    lansoprazole (PREVACID) 30 MG capsule Take 30 mg by mouth once daily.      linaCLOtide (LINZESS) 290 mcg Cap capsule Take 1 capsule (290 mcg total) by mouth once daily. 90 capsule 3    losartan (COZAAR) 100 MG tablet Take 1 tablet (100 mg total) by mouth once daily. 90 tablet 3    metFORMIN (GLUCOPHAGE-XR) 500 MG ER 24hr tablet Take 500 mg by mouth once daily.       peg 400-hypromellose-glycerin (DRY EYE RELIEF) 1-0.2-0.2 % Drop Apply 1 drop to eye 2 (two) times daily as needed.      psyllium (METAMUCIL) powder Take 1 packet by mouth 2 (two) times daily as needed.      rosuvastatin (CRESTOR) 20 MG tablet Take 1 tablet (20 mg total) by mouth every evening. 90 tablet 3    sucralfate (CARAFATE) 1 gram tablet       saliva substitute combo no.9 (BIOTENE DRY MOUTH ORAL RINSE MM) 1 Dose by Mucous Membrane route once daily.      [DISCONTINUED] HYDROcodone-acetaminophen (NORCO) 5-325 mg per tablet Take 1 tablet by mouth every 6 (six) hours as needed for Pain. (Patient not taking: Reported on 12/9/2020) 15 tablet 0    [DISCONTINUED] Lactobacillus rhamnosus GG (CULTURELLE) 15 billion cell capsule Take 1 capsule by mouth once daily.       Current Facility-Administered Medications on File Prior to Visit   Medication Dose Route Frequency Provider Last Rate Last Dose    electrolyte-S (ISOLYTE)   Intravenous Continuous Nilay Jeffries MD 10 mL/hr at 09/17/20 0950      lactated ringers infusion   Intravenous Continuous Nilay Jeffries MD           Review of Systems   Constitutional: Negative for chills and fever.   HENT: Negative for congestion.    Respiratory: Negative for shortness of breath.    Cardiovascular: Negative for chest pain and palpitations.   Gastrointestinal: Negative for diarrhea, nausea and vomiting.   Genitourinary: Negative for decreased urine volume, difficulty urinating, dysuria, flank pain, frequency, hematuria, pelvic pain and urgency.   Skin: Negative for rash.   All other systems reviewed and are negative.      Objective:      Physical Exam  Vitals signs reviewed.   Constitutional:       Appearance: Normal appearance. She is well-developed.   HENT:      Head: Normocephalic and atraumatic.   Eyes:      Conjunctiva/sclera: Conjunctivae normal.      Pupils: Pupils are equal, round, and reactive to light.   Neck:      Musculoskeletal: Normal range of motion and neck  supple.   Cardiovascular:      Rate and Rhythm: Normal rate and regular rhythm.      Heart sounds: Normal heart sounds.   Pulmonary:      Effort: Pulmonary effort is normal.      Breath sounds: Normal breath sounds.   Abdominal:      General: Bowel sounds are normal.      Palpations: Abdomen is soft.   Musculoskeletal: Normal range of motion.   Skin:     General: Skin is warm and dry.   Neurological:      Mental Status: She is alert and oriented to person, place, and time.   Psychiatric:         Mood and Affect: Mood normal.         Behavior: Behavior normal.         Assessment:       1. Urinary tract infection with hematuria, site unspecified    2. Type 2 diabetes mellitus without complication, without long-term current use of insulin    3. Hypertension associated with diabetes    4. Hyperlipidemia associated with type 2 diabetes mellitus        Plan:       Erica was seen today for weight loss.    Diagnoses and all orders for this visit:    Urinary tract infection with hematuria, site unspecified  -     POCT urine dipstick without microscope  -     Urinalysis  -     Urine culture    Type 2 diabetes mellitus without complication, without long-term current use of insulin  -     Hemoglobin A1C; Future 1/30/21 or later    Hypertension associated with diabetes  Stable and controlled. Continue current treatment plan as previously prescribed with your PCP.     Hyperlipidemia associated with type 2 diabetes mellitus  Stable and controlled. Continue current treatment plan as previously prescribed with your PCP.     Patient education provided.  All questions and concerns addressed  RTC PRN and if symptoms worsen or fail to improve  Patient verbalizes understanding        There are no Patient Instructions on file for this visit.

## 2020-12-10 ENCOUNTER — TELEPHONE (OUTPATIENT)
Dept: CARDIOLOGY | Facility: CLINIC | Age: 75
End: 2020-12-10

## 2020-12-10 LAB
BACTERIA #/AREA URNS AUTO: ABNORMAL /HPF
BACTERIA UR CULT: NORMAL
BACTERIA UR CULT: NORMAL
BILIRUB UR QL STRIP: NEGATIVE
CLARITY UR REFRACT.AUTO: CLEAR
COLOR UR AUTO: YELLOW
GLUCOSE UR QL STRIP: NEGATIVE
HGB UR QL STRIP: ABNORMAL
KETONES UR QL STRIP: NEGATIVE
LEUKOCYTE ESTERASE UR QL STRIP: ABNORMAL
MICROSCOPIC COMMENT: ABNORMAL
NITRITE UR QL STRIP: NEGATIVE
PH UR STRIP: 5 [PH] (ref 5–8)
PROT UR QL STRIP: NEGATIVE
RBC #/AREA URNS AUTO: 2 /HPF (ref 0–4)
SP GR UR STRIP: 1.01 (ref 1–1.03)
SQUAMOUS #/AREA URNS AUTO: 0 /HPF
URN SPEC COLLECT METH UR: ABNORMAL
WBC #/AREA URNS AUTO: 1 /HPF (ref 0–5)

## 2020-12-10 NOTE — TELEPHONE ENCOUNTER
----- Message from Lynn Schmitt sent at 12/10/2020 10:08 AM CST -----  Contact: pt  Type: Needs Medical Advice    Who Called: pt  Best Call Back Number: [phone] or [phone]  Additional Info-Pt has an upcoming apt on 1.26.21 however, she stated she is having a procedure that requires clearance from a cardio doctor and would like to know if she could be seen earlier than 1.26.21.    Please Advise-Thank you~

## 2020-12-11 NOTE — TELEPHONE ENCOUNTER
----- Message from Nilay Carmichael sent at 12/11/2020 11:58 AM CST -----  Regarding: pt  Type: Needs Medical Advice    Who Called:  pt    Best Call Back Number: [phone]   Additional Information: pt following up on message sent yesterday. Please call to let know status of message.

## 2020-12-14 ENCOUNTER — TELEPHONE (OUTPATIENT)
Dept: OPHTHALMOLOGY | Facility: CLINIC | Age: 75
End: 2020-12-14

## 2020-12-14 ENCOUNTER — OFFICE VISIT (OUTPATIENT)
Dept: OPHTHALMOLOGY | Facility: CLINIC | Age: 75
End: 2020-12-14
Payer: MEDICARE

## 2020-12-14 DIAGNOSIS — H04.123 DRY EYE SYNDROME, BILATERAL: Primary | ICD-10-CM

## 2020-12-14 DIAGNOSIS — H40.013 OPEN ANGLE WITH BORDERLINE FINDINGS AND LOW GLAUCOMA RISK IN BOTH EYES: ICD-10-CM

## 2020-12-14 DIAGNOSIS — H25.13 NUCLEAR SCLEROTIC CATARACT, BILATERAL: ICD-10-CM

## 2020-12-14 PROCEDURE — 99499 RISK ADDL DX/OHS AUDIT: ICD-10-PCS | Mod: S$GLB,,, | Performed by: OPHTHALMOLOGY

## 2020-12-14 PROCEDURE — 99999 PR PBB SHADOW E&M-EST. PATIENT-LVL III: ICD-10-PCS | Mod: PBBFAC,,, | Performed by: OPHTHALMOLOGY

## 2020-12-14 PROCEDURE — 92012 INTRM OPH EXAM EST PATIENT: CPT | Mod: 25,S$GLB,, | Performed by: OPHTHALMOLOGY

## 2020-12-14 PROCEDURE — 92012 PR EYE EXAM, EST PATIENT,INTERMED: ICD-10-PCS | Mod: 25,S$GLB,, | Performed by: OPHTHALMOLOGY

## 2020-12-14 PROCEDURE — 1126F PR PAIN SEVERITY QUANTIFIED, NO PAIN PRESENT: ICD-10-PCS | Mod: S$GLB,,, | Performed by: OPHTHALMOLOGY

## 2020-12-14 PROCEDURE — 68761 CLOSE TEAR DUCT OPENING: CPT | Mod: 50,S$GLB,, | Performed by: OPHTHALMOLOGY

## 2020-12-14 PROCEDURE — 1126F AMNT PAIN NOTED NONE PRSNT: CPT | Mod: S$GLB,,, | Performed by: OPHTHALMOLOGY

## 2020-12-14 PROCEDURE — 99999 PR PBB SHADOW E&M-EST. PATIENT-LVL III: CPT | Mod: PBBFAC,,, | Performed by: OPHTHALMOLOGY

## 2020-12-14 PROCEDURE — 99499 UNLISTED E&M SERVICE: CPT | Mod: S$GLB,,, | Performed by: OPHTHALMOLOGY

## 2020-12-14 PROCEDURE — 68761 PR CLOSE TEAR DUCT OPENING BY PLUG,EA: ICD-10-PCS | Mod: 50,S$GLB,, | Performed by: OPHTHALMOLOGY

## 2020-12-14 NOTE — TELEPHONE ENCOUNTER
----- Message from Leslie Gilman sent at 12/14/2020  1:13 PM CST -----  Contact: patient  Type:  Patient Returning Call    Who Called:  patient  Who Left Message for Patient:  estrella  Does the patient know what this is regarding?:  yes  Best Call Back Number:  [phone]  Additional Information:  sent message to [email or web address]

## 2020-12-14 NOTE — PROGRESS NOTES
"HPI     4 month F/u Dry Eyes & Puntal Plugs. Denies eye pain today, states plugs   have been helpful with dryness .Using Systane OU BID has restasis but not   using at this time.     Last edited by Suze Graves on 12/14/2020  9:58 AM. (History)            Assessment /Plan     For exam results, see Encounter Report.    Dry eye syndrome, bilateral    Nuclear sclerotic cataract, bilateral    Open angle with borderline findings and low glaucoma risk in both eyes      1. Dry eye syndrome, bilateral  Pt felt that temporary plugs were beneficial for symptoms, will proceed with "permanent" plug placement today.  Eagle plugs used, 0.6mm OD, 0.5mm OS.  Okay to continue holding restasis as patient has self dc'ed.  Continue ATs PRN.    F/u 4 months    2. Nuclear sclerotic cataract, bilateral  Not bothersome, observe    3. Open angle with borderline findings and low glaucoma risk in both eyes  Neg famhx  Pachy slightly thin    OCT NFL is essentially normal  IOP is normal off meds    Dilate next visit, 4 months    PROCEDURE NOTE:    Diagnosis:  See above.    Indication:  Dry eye syndrome    Anesthesia: Topical proparacaine    Procedure: Permanent punctal plug placed inferiorly OU today at the slit lamp, size 0.6mm OD, 0.5mm OS.      Complications: none, pt tolerated procedure well                   "

## 2020-12-16 ENCOUNTER — TELEPHONE (OUTPATIENT)
Dept: FAMILY MEDICINE | Facility: CLINIC | Age: 75
End: 2020-12-16

## 2020-12-16 NOTE — TELEPHONE ENCOUNTER
----- Message from Maral Soler sent at 12/16/2020 12:35 PM CST -----  Regarding: pt  Type: Patient Call Back    Who called:pt    What is the request in detail:pt returned the nurse's phone call. Call pt    Can the clinic reply by [facility]?    Would the patient rather a call back or a response via [facility]? call    Best call back number:[phone] (home)       Additional Information:

## 2020-12-16 NOTE — TELEPHONE ENCOUNTER
----- Message from Joanie Jorge sent at 12/16/2020 12:36 PM CST -----  Regarding: rtc  Contact: TODD RODRIGUEZ [[id number]]  Patient is returning nurse's phone call.  Please call patient back at [phone].

## 2020-12-21 ENCOUNTER — OFFICE VISIT (OUTPATIENT)
Dept: CARDIOLOGY | Facility: CLINIC | Age: 75
End: 2020-12-21
Payer: MEDICARE

## 2020-12-21 VITALS
HEART RATE: 72 BPM | DIASTOLIC BLOOD PRESSURE: 70 MMHG | HEIGHT: 66 IN | BODY MASS INDEX: 23.95 KG/M2 | SYSTOLIC BLOOD PRESSURE: 120 MMHG | OXYGEN SATURATION: 98 % | WEIGHT: 149 LBS

## 2020-12-21 DIAGNOSIS — I15.2 HYPERTENSION ASSOCIATED WITH DIABETES: ICD-10-CM

## 2020-12-21 DIAGNOSIS — N18.31 STAGE 3A CHRONIC KIDNEY DISEASE: ICD-10-CM

## 2020-12-21 DIAGNOSIS — I10 HYPERTENSION, UNSPECIFIED TYPE: Primary | ICD-10-CM

## 2020-12-21 DIAGNOSIS — E11.59 HYPERTENSION ASSOCIATED WITH DIABETES: ICD-10-CM

## 2020-12-21 PROCEDURE — 99214 PR OFFICE/OUTPT VISIT, EST, LEVL IV, 30-39 MIN: ICD-10-PCS | Mod: S$GLB,,, | Performed by: SPECIALIST

## 2020-12-21 PROCEDURE — 1159F PR MEDICATION LIST DOCUMENTED IN MEDICAL RECORD: ICD-10-PCS | Mod: S$GLB,,, | Performed by: SPECIALIST

## 2020-12-21 PROCEDURE — 1101F PR PT FALLS ASSESS DOC 0-1 FALLS W/OUT INJ PAST YR: ICD-10-PCS | Mod: CPTII,S$GLB,, | Performed by: SPECIALIST

## 2020-12-21 PROCEDURE — 3074F PR MOST RECENT SYSTOLIC BLOOD PRESSURE < 130 MM HG: ICD-10-PCS | Mod: CPTII,S$GLB,, | Performed by: SPECIALIST

## 2020-12-21 PROCEDURE — 1159F MED LIST DOCD IN RCRD: CPT | Mod: S$GLB,,, | Performed by: SPECIALIST

## 2020-12-21 PROCEDURE — 3078F PR MOST RECENT DIASTOLIC BLOOD PRESSURE < 80 MM HG: ICD-10-PCS | Mod: CPTII,S$GLB,, | Performed by: SPECIALIST

## 2020-12-21 PROCEDURE — 3288F PR FALLS RISK ASSESSMENT DOCUMENTED: ICD-10-PCS | Mod: CPTII,S$GLB,, | Performed by: SPECIALIST

## 2020-12-21 PROCEDURE — 3078F DIAST BP <80 MM HG: CPT | Mod: CPTII,S$GLB,, | Performed by: SPECIALIST

## 2020-12-21 PROCEDURE — 1101F PT FALLS ASSESS-DOCD LE1/YR: CPT | Mod: CPTII,S$GLB,, | Performed by: SPECIALIST

## 2020-12-21 PROCEDURE — 3074F SYST BP LT 130 MM HG: CPT | Mod: CPTII,S$GLB,, | Performed by: SPECIALIST

## 2020-12-21 PROCEDURE — 3288F FALL RISK ASSESSMENT DOCD: CPT | Mod: CPTII,S$GLB,, | Performed by: SPECIALIST

## 2020-12-21 PROCEDURE — 99214 OFFICE O/P EST MOD 30 MIN: CPT | Mod: S$GLB,,, | Performed by: SPECIALIST

## 2020-12-21 NOTE — PROGRESS NOTES
Subjective:    Patient ID:  Erica Masterson is a 75 y.o. female who presents for   Hypertension and Results (u/s of abd)    1)  Has stomach pbs and  Lots gas  Off milk products  gb out      Lost weight    off metformin  And stomach no     Better       Patient needs GI clearance.  From a cardiovascular standpoint she is fine with her blood pressure under control lab okay  Review of patient's allergies indicates:  No Known Allergies    Past Medical History:   Diagnosis Date    Allergy     Anxiety     Cataract     Colon polyp     Degenerative arthritis of knee 4/3/2012    Diverticulosis     GERD (gastroesophageal reflux disease)     Hyperlipidemia     Hypertension     Multinodular goiter 3/26/2012    Myopathy, unspecified 1/18/2010    New onset type 2 diabetes mellitus 3/24/2020    New onset type 2 diabetes mellitus 3/24/2020    Tuberculosis      Past Surgical History:   Procedure Laterality Date    BREAST BIOPSY Left 2015    benign    COLONOSCOPY  12/2/15    Dr. Britton, multiple polyps, recheck five years-three years if more than 2 polyps are adenomatous    COLONOSCOPY N/A 12/2/2015    COLONOSCOPY N/A 3/20/2019    Procedure: COLONOSCOPY;  Surgeon: Jose Britton MD;  Location: [facility];  Service: Endoscopy;  Laterality: N/A;    COLONOSCOPY N/A 5/20/2020    Dr. Britton; internal hemorrhoids; diverticulosis; polyps removed; repeat in 3 years    CYSTOSCOPY N/A 8/26/2020    Procedure: CYSTOSCOPY;  Surgeon: Charlotte Walters Jr., MD;  [facility];  Service: Urology;  Laterality: N/A;    ESOPHAGOGASTRODUODENOSCOPY N/A 5/20/2020    Dr. Britton; small hiatal hernia; gastritis; 8 gastric polyps removed    FOOT SURGERY      HYSTERECTOMY      INTRALUMINAL GASTROINTESTINAL TRACT IMAGING VIA CAPSULE N/A 6/4/2020    Procedure: IMAGING PROCEDURE, GI TRACT, INTRALUMINAL, VIA CAPSULE;  Surgeon: Jose Britton MD;  Location: [facility];  Service: Endoscopy;  Laterality: N/A;    KNEE SURGERY  06/06/2013     right knee tear Dr Iverson     LAPAROSCOPIC CHOLECYSTECTOMY N/A 9/17/2020    Procedure: CHOLECYSTECTOMY, LAPAROSCOPIC;  Surgeon: Forrest Veronica MD;  Location: [facility] OR;  Service: General;  Laterality: N/A;    LUNG LOBECTOMY  1966    right middle lobectomy, due to Tb    OOPHORECTOMY      SHOULDER SURGERY      francis      Social History     Tobacco Use    Smoking status: Never Smoker    Smokeless tobacco: Never Used   Substance Use Topics    Alcohol use: Not Currently     Comment: stopped 2019    Drug use: No        Review of Systems     Review of Systems   Constitution: Positive for weight loss.   HENT: Negative.    Eyes: Negative.    Cardiovascular: Negative for chest pain, irregular heartbeat and leg swelling.   Respiratory: Positive for shortness of breath.         Tumor r lung-   Lobectomy 52 years ago   rx tb    Musculoskeletal: Negative.    Gastrointestinal: Positive for bloating, abdominal pain and flatus. Negative for heartburn.    gets cramps  Mild        Objective:        Vitals:    12/21/20 1545   BP: 120/70   Pulse: 72       Lab Results   Component Value Date    WBC 6.59 09/17/2020    HGB 11.8 (L) 09/17/2020    HCT 38.0 09/17/2020     09/17/2020    CHOL 163 10/30/2020    TRIG 42 10/30/2020    HDL 63 10/30/2020    ALT 13 10/30/2020    AST 16 10/30/2020     10/30/2020    K 4.2 10/30/2020     10/30/2020    CREATININE 0.9 10/30/2020    BUN 14 10/30/2020    CO2 31 (H) 10/30/2020    TSH 1.252 06/25/2020    INR 1.3 06/20/2020    GLUF 104 05/22/2004    HGBA1C 5.8 (H) 10/30/2020        ECHOCARDIOGRAM RESULTS  Results for orders placed in visit on 12/03/19   Echo    Narrative · Concentric left ventricular remodeling.  · Increased (hyperdynamic) left ventricular systolic function. The   estimated ejection fraction is 70%  · Normal LV diastolic function.  · Normal right ventricular systolic function.  · There is mild leaflet calcification of the Mitral Valve.  · Normal central venous  pressure (3 mm Hg).  · The estimated PA systolic pressure is 20 mm Hg  · Since previous echo of 12/22/2017, there is no significant change.   Atrial septal aneurysm is still noted.          CURRENT/PREVIOUS VISIT EKG  Results for orders placed or performed in visit on 11/22/19   EKG 12-lead    Collection Time: 11/22/19  2:12 PM    Narrative    Test Reason : Z00.00,    Vent. Rate : 082 BPM     Atrial Rate : 082 BPM     P-R Int : 164 ms          QRS Dur : 078 ms      QT Int : 378 ms       P-R-T Axes : 062 028 049 degrees     QTc Int : 441 ms    Sinus rhythm  Normal ECG  When compared with ECG of 21-OCT-2018 07:24,  with rate increase  Confirmed by Amelia Stubbs MD ([id number]) on 11/22/2019 3:57:16 PM    Referred By: AMELIA STUBBS           Confirmed By:Amelia Stubbs MD     Results for orders placed in visit on 12/03/19   Echo    Narrative · Concentric left ventricular remodeling.  · Increased (hyperdynamic) left ventricular systolic function. The   estimated ejection fraction is 70%  · Normal LV diastolic function.  · Normal right ventricular systolic function.  · There is mild leaflet calcification of the Mitral Valve.  · Normal central venous pressure (3 mm Hg).  · The estimated PA systolic pressure is 20 mm Hg  · Since previous echo of 12/22/2017, there is no significant change.   Atrial septal aneurysm is still noted.        No results found for this or any previous visit.    PHYSICAL EXAM    Physical Exam  of blood pressure is 140/80  Head neck no carotid bruit  Lungs are clear  Cardiac sounds normal  Abdomen no masses  Extremities reveal excellent pulses  Neurologic grossly intact    Medication List with Changes/Refills   Current Medications    AMLODIPINE (NORVASC) 2.5 MG TABLET    Take 1 tablet (2.5 mg total) by mouth once daily.    BLOOD SUGAR DIAGNOSTIC (BLOOD GLUCOSE TEST) STRP    True Metrix glucose strips check once daily    BLOOD-GLUCOSE METER KIT    True Metrix glucometer check glucose once daily    CYANOCOBALAMIN  (VITAMIN B-12) 100 MCG TABLET    Take 100 mcg by mouth once daily.    CYCLOSPORINE (RESTASIS) 0.05 % OPHTHALMIC EMULSION    Place 1 drop into both eyes 2 (two) times daily.    DOXEPIN (SINEQUAN) 10 MG CAPSULE    Take 1 capsule (10 mg total) by mouth every evening.    LANCETS MISC    True Metrix lancets check once daily    LANSOPRAZOLE (PREVACID) 30 MG CAPSULE    Take 30 mg by mouth once daily.    LINACLOTIDE (LINZESS) 290 MCG CAP CAPSULE    Take 1 capsule (290 mcg total) by mouth once daily.    LOSARTAN (COZAAR) 100 MG TABLET    Take 1 tablet (100 mg total) by mouth once daily.    METFORMIN (GLUCOPHAGE-XR) 500 MG ER 24HR TABLET    Take 500 mg by mouth once daily.    -HYPROMELLOSE-GLYCERIN (DRY EYE RELIEF) 1-0.2-0.2 % DROP    Apply 1 drop to eye 2 (two) times daily as needed.    PSYLLIUM (METAMUCIL) POWDER    Take 1 packet by mouth 2 (two) times daily as needed.    ROSUVASTATIN (CRESTOR) 20 MG TABLET    Take 1 tablet (20 mg total) by mouth every evening.    SALIVA SUBSTITUTE COMBO NO.9 (BIOTENE DRY MOUTH ORAL RINSE MM)    1 Dose by Mucous Membrane route once daily.    SUCRALFATE (CARAFATE) 1 GRAM TABLET               Assessment:     gastritis  High blood pressure controlled on losartan and amlodipine     Plan:   Return to clinic as needed in approximately 6 months  Cleared for gastroscopy    Problem List Items Addressed This Visit     None           No follow-ups on file.

## 2020-12-21 NOTE — LETTER
2020    Erica Masterson  [address]  [place] [address]             [facility]  [address], [address]  [place] [address]  Phone: [phone]  Fax: [phone] Patient: Erica Masterson  : 1945  Referring Doctor: Dr diggs  Type of procedure: EGD    Current Outpatient Medications   Medication Sig    amLODIPine (NORVASC) 2.5 MG tablet Take 1 tablet (2.5 mg total) by mouth once daily.    blood sugar diagnostic (BLOOD GLUCOSE TEST) Strp True Metrix glucose strips check once daily    blood-glucose meter kit True Metrix glucometer check glucose once daily    cyanocobalamin (VITAMIN B-12) 100 MCG tablet Take 100 mcg by mouth once daily.    cycloSPORINE (RESTASIS) 0.05 % ophthalmic emulsion Place 1 drop into both eyes 2 (two) times daily.    doxepin (SINEQUAN) 10 MG capsule Take 1 capsule (10 mg total) by mouth every evening.    lancets Misc True Metrix lancets check once daily    lansoprazole (PREVACID) 30 MG capsule Take 30 mg by mouth once daily.    linaCLOtide (LINZESS) 290 mcg Cap capsule Take 1 capsule (290 mcg total) by mouth once daily.    losartan (COZAAR) 100 MG tablet Take 1 tablet (100 mg total) by mouth once daily.    metFORMIN (GLUCOPHAGE-XR) 500 MG ER 24hr tablet Take 500 mg by mouth once daily.    peg 400-hypromellose-glycerin (DRY EYE RELIEF) 1-0.2-0.2 % Drop Apply 1 drop to eye 2 (two) times daily as needed.    psyllium (METAMUCIL) powder Take 1 packet by mouth 2 (two) times daily as needed.    rosuvastatin (CRESTOR) 20 MG tablet Take 1 tablet (20 mg total) by mouth every evening.    saliva substitute combo no.9 (BIOTENE DRY MOUTH ORAL RINSE MM) 1 Dose by Mucous Membrane route once daily.    sucralfate (CARAFATE) 1 gram tablet      No current facility-administered medications for this visit.      Facility-Administered Medications Ordered in Other Visits   Medication    electrolyte-S (ISOLYTE)    lactated ringers infusion       This  patient has been assessed for risk factors for clearance of surgery with the following stipulations:    ___ No contraindications  ___ Recommendations for antiplatelet/anticoagulant medications:  __x_ Cleared for surgery :  ___ Not cleared for surgery due to the following reasons:      If you have any questions regarding the above, please contact my office at [phone].    Sincerely,    .

## 2020-12-21 NOTE — Clinical Note
December 21, 2020      Juan M Stubbs MD  [address]  [address]  [place] [address]           [facility] [place]  [address], [address]  [place] [address]  Phone: [phone]  Fax: [phone]          Patient: Erica Masterson   MR Number: [id number]   YOB: 1945   Date of Visit: 12/21/2020       Dear Dr. Juan M Stubbs:    Thank you for referring Erica Masterson to me for evaluation. Attached you will find relevant portions of my assessment and plan of care.    If you have questions, please do not hesitate to call me. I look forward to following Erica Masterson along with you.    Sincerely,    Dustin Robles MD    Enclosure  CC:  No Recipients    If you would like to receive this communication electronically, please contact [email or web address] or [phone] to request more information on [employer] access.    For providers and/or their staff who would like to refer a patient to [facility], please contact us through our one-stop-shop provider referral line, [facility], at [phone].    If you feel you have received this communication in error or would no longer like to receive these types of communications, please e-mail [email or web address]

## 2020-12-22 ENCOUNTER — TELEPHONE (OUTPATIENT)
Dept: CARDIOLOGY | Facility: CLINIC | Age: 75
End: 2020-12-22

## 2020-12-22 NOTE — TELEPHONE ENCOUNTER
----- Message from Senthil Sams sent at 12/22/2020  8:22 AM CST -----  Regarding: clearance  Pt was here yesterday   You should be receiving a clearance request today on her

## 2020-12-23 ENCOUNTER — TELEPHONE (OUTPATIENT)
Dept: FAMILY MEDICINE | Facility: CLINIC | Age: 75
End: 2020-12-23

## 2020-12-23 NOTE — TELEPHONE ENCOUNTER
----- Message from Albin Stewart sent at 12/23/2020  9:29 AM CST -----  Type: Needs Medical Advice    Who Called:  Patient  Best Call Back Number: [phone]; [phone]  Additional Information: Patient would like to discuss receiving test results through mail. Please call to advise. Thanks!

## 2020-12-30 ENCOUNTER — NURSE TRIAGE (OUTPATIENT)
Dept: ADMINISTRATIVE | Facility: CLINIC | Age: 75
End: 2020-12-30

## 2020-12-30 NOTE — TELEPHONE ENCOUNTER
Since Dec 14 it has range . Bs was 68 this am and 75 today at 12 noon today. Pt stated she drank oj with sugar this am. And blood sugar was 75 at noon. Pt denies any symptoms. Pt instructed on home care per care advice. Pt stated she is taking metformin in the evening. Please call and advise pt. Pt is awaiting a return call. [phone]    Reason for Disposition   Low blood sugar prevention, questions about    Additional Information   Negative: Unconscious or difficult to awaken   Negative: Seizure occurs   Negative: Acting confused (e.g., disoriented, slurred speech)   Negative: Very weak (can't stand)   Negative: Sounds like a life-threatening emergency to the triager   Negative: Vomiting and signs of dehydration (e.g., very dry mouth, lightheaded, dark urine, etc.)   Negative: Low blood sugar symptoms persist > 30 minutes AND using low blood sugar Care Advice   Negative: Low blood glucose (< 70 mg/dL or 3.9 mmol/L) persists > 30 minutes AND using low blood sugar Care Advice   Negative: Patient sounds very sick or weak to the triager   Negative: Diabetes medication overdose (e.g., insulin error) and triager unable to answer question   Negative: Caller has URGENT medication or insulin pump question and triager unable to answer question   Negative: Low blood sugar symptoms with no other adult present AND hasn't tried Care Advice   Negative: Low blood glucose (< 70 mg/dL or 3.9 mmol/L) with no other adult present AND hasn't tried Care Advice   Negative: Low blood glucose (< 70 mg/dL or 3.9 mmol/L) or symptomatic, now improved with Care Advice AND cause unknown   Negative: Patient wants to be seen   Negative: Morning (before breakfast) blood glucose < 80 mg/dL (4.4 mmol/L) and more than once in past week   Negative: Evening (after bedtime snack) blood glucose < 100 mg/dL (5.6 mmol/L) and more than once in past week   Negative: Caller has NON-URGENT medication or insulin pump question and  triager unable to answer question   Negative: Blood glucose < 70 mg/dL (3.9 mmol/L) or symptomatic AND has other adult present    Protocols used: DIABETES - LOW BLOOD SUGAR-A-OH

## 2020-12-30 NOTE — TELEPHONE ENCOUNTER
Patient was not having any symptoms when she took these blood glucose readings. She usually takes it every morning. Has been getting readings of 75-80.

## 2021-01-07 ENCOUNTER — OFFICE VISIT (OUTPATIENT)
Dept: OPHTHALMOLOGY | Facility: CLINIC | Age: 76
End: 2021-01-07
Payer: MEDICARE

## 2021-01-07 DIAGNOSIS — H04.123 DRY EYE SYNDROME, BILATERAL: Primary | ICD-10-CM

## 2021-01-07 PROCEDURE — 99999 PR PBB SHADOW E&M-EST. PATIENT-LVL II: CPT | Mod: PBBFAC,,, | Performed by: OPHTHALMOLOGY

## 2021-01-07 PROCEDURE — 92012 PR EYE EXAM, EST PATIENT,INTERMED: ICD-10-PCS | Mod: S$GLB,,, | Performed by: OPHTHALMOLOGY

## 2021-01-07 PROCEDURE — 1125F AMNT PAIN NOTED PAIN PRSNT: CPT | Mod: S$GLB,,, | Performed by: OPHTHALMOLOGY

## 2021-01-07 PROCEDURE — 92012 INTRM OPH EXAM EST PATIENT: CPT | Mod: S$GLB,,, | Performed by: OPHTHALMOLOGY

## 2021-01-07 PROCEDURE — 99999 PR PBB SHADOW E&M-EST. PATIENT-LVL II: ICD-10-PCS | Mod: PBBFAC,,, | Performed by: OPHTHALMOLOGY

## 2021-01-07 PROCEDURE — 1125F PR PAIN SEVERITY QUANTIFIED, PAIN PRESENT: ICD-10-PCS | Mod: S$GLB,,, | Performed by: OPHTHALMOLOGY

## 2021-01-15 ENCOUNTER — LAB VISIT (OUTPATIENT)
Dept: LAB | Facility: HOSPITAL | Age: 76
End: 2021-01-15
Attending: NURSE PRACTITIONER
Payer: MEDICARE

## 2021-01-15 DIAGNOSIS — E11.9 TYPE 2 DIABETES MELLITUS WITHOUT COMPLICATION, WITHOUT LONG-TERM CURRENT USE OF INSULIN: ICD-10-CM

## 2021-01-15 PROCEDURE — 36415 COLL VENOUS BLD VENIPUNCTURE: CPT | Mod: PO

## 2021-01-15 PROCEDURE — 83036 HEMOGLOBIN GLYCOSYLATED A1C: CPT

## 2021-01-16 ENCOUNTER — IMMUNIZATION (OUTPATIENT)
Dept: PRIMARY CARE CLINIC | Facility: CLINIC | Age: 76
End: 2021-01-16
Payer: MEDICARE

## 2021-01-16 DIAGNOSIS — Z23 NEED FOR VACCINATION: Primary | ICD-10-CM

## 2021-01-16 LAB
ESTIMATED AVG GLUCOSE: 123 MG/DL (ref 68–131)
HBA1C MFR BLD HPLC: 5.9 % (ref 4–5.6)

## 2021-01-16 PROCEDURE — 0001A COVID-19, MRNA, LNP-S, PF, 30 MCG/0.3 ML DOSE VACCINE: ICD-10-PCS | Mod: S$GLB,,, | Performed by: FAMILY MEDICINE

## 2021-01-16 PROCEDURE — 91300 COVID-19, MRNA, LNP-S, PF, 30 MCG/0.3 ML DOSE VACCINE: ICD-10-PCS | Mod: S$GLB,,, | Performed by: FAMILY MEDICINE

## 2021-01-16 PROCEDURE — 0001A COVID-19, MRNA, LNP-S, PF, 30 MCG/0.3 ML DOSE VACCINE: CPT | Mod: S$GLB,,, | Performed by: FAMILY MEDICINE

## 2021-01-16 PROCEDURE — 91300 COVID-19, MRNA, LNP-S, PF, 30 MCG/0.3 ML DOSE VACCINE: CPT | Mod: S$GLB,,, | Performed by: FAMILY MEDICINE

## 2021-01-19 ENCOUNTER — TELEPHONE (OUTPATIENT)
Dept: FAMILY MEDICINE | Facility: CLINIC | Age: 76
End: 2021-01-19

## 2021-01-19 ENCOUNTER — TELEPHONE (OUTPATIENT)
Dept: CARDIOLOGY | Facility: CLINIC | Age: 76
End: 2021-01-19

## 2021-01-22 ENCOUNTER — OFFICE VISIT (OUTPATIENT)
Dept: FAMILY MEDICINE | Facility: CLINIC | Age: 76
End: 2021-01-22
Attending: FAMILY MEDICINE
Payer: MEDICARE

## 2021-01-22 VITALS
WEIGHT: 152.56 LBS | TEMPERATURE: 98 F | OXYGEN SATURATION: 98 % | HEIGHT: 66 IN | BODY MASS INDEX: 24.52 KG/M2 | DIASTOLIC BLOOD PRESSURE: 60 MMHG | HEART RATE: 68 BPM | SYSTOLIC BLOOD PRESSURE: 122 MMHG

## 2021-01-22 DIAGNOSIS — E11.59 HYPERTENSION ASSOCIATED WITH DIABETES: ICD-10-CM

## 2021-01-22 DIAGNOSIS — E11.9 TYPE 2 DIABETES MELLITUS WITHOUT COMPLICATION, WITHOUT LONG-TERM CURRENT USE OF INSULIN: ICD-10-CM

## 2021-01-22 DIAGNOSIS — F41.1 GAD (GENERALIZED ANXIETY DISORDER): ICD-10-CM

## 2021-01-22 DIAGNOSIS — Z11.59 NEED FOR HEPATITIS C SCREENING TEST: ICD-10-CM

## 2021-01-22 DIAGNOSIS — E11.69 HYPERLIPIDEMIA ASSOCIATED WITH TYPE 2 DIABETES MELLITUS: ICD-10-CM

## 2021-01-22 DIAGNOSIS — Z00.00 ENCOUNTER FOR PREVENTIVE HEALTH EXAMINATION: Primary | ICD-10-CM

## 2021-01-22 DIAGNOSIS — E78.5 HYPERLIPIDEMIA ASSOCIATED WITH TYPE 2 DIABETES MELLITUS: ICD-10-CM

## 2021-01-22 DIAGNOSIS — F32.A DEPRESSION, UNSPECIFIED DEPRESSION TYPE: ICD-10-CM

## 2021-01-22 DIAGNOSIS — I15.2 HYPERTENSION ASSOCIATED WITH DIABETES: ICD-10-CM

## 2021-01-22 DIAGNOSIS — N18.31 STAGE 3A CHRONIC KIDNEY DISEASE: ICD-10-CM

## 2021-01-22 PROCEDURE — 1126F PR PAIN SEVERITY QUANTIFIED, NO PAIN PRESENT: ICD-10-PCS | Mod: S$GLB,,, | Performed by: FAMILY MEDICINE

## 2021-01-22 PROCEDURE — 99999 PR PBB SHADOW E&M-EST. PATIENT-LVL IV: ICD-10-PCS | Mod: PBBFAC,,, | Performed by: FAMILY MEDICINE

## 2021-01-22 PROCEDURE — 3078F PR MOST RECENT DIASTOLIC BLOOD PRESSURE < 80 MM HG: ICD-10-PCS | Mod: CPTII,S$GLB,, | Performed by: FAMILY MEDICINE

## 2021-01-22 PROCEDURE — 1159F PR MEDICATION LIST DOCUMENTED IN MEDICAL RECORD: ICD-10-PCS | Mod: S$GLB,,, | Performed by: FAMILY MEDICINE

## 2021-01-22 PROCEDURE — 3288F FALL RISK ASSESSMENT DOCD: CPT | Mod: CPTII,S$GLB,, | Performed by: FAMILY MEDICINE

## 2021-01-22 PROCEDURE — 99214 PR OFFICE/OUTPT VISIT, EST, LEVL IV, 30-39 MIN: ICD-10-PCS | Mod: S$GLB,,, | Performed by: FAMILY MEDICINE

## 2021-01-22 PROCEDURE — 1101F PR PT FALLS ASSESS DOC 0-1 FALLS W/OUT INJ PAST YR: ICD-10-PCS | Mod: CPTII,S$GLB,, | Performed by: FAMILY MEDICINE

## 2021-01-22 PROCEDURE — 99499 UNLISTED E&M SERVICE: CPT | Mod: S$GLB,,, | Performed by: FAMILY MEDICINE

## 2021-01-22 PROCEDURE — 3074F PR MOST RECENT SYSTOLIC BLOOD PRESSURE < 130 MM HG: ICD-10-PCS | Mod: CPTII,S$GLB,, | Performed by: FAMILY MEDICINE

## 2021-01-22 PROCEDURE — 3074F SYST BP LT 130 MM HG: CPT | Mod: CPTII,S$GLB,, | Performed by: FAMILY MEDICINE

## 2021-01-22 PROCEDURE — 1126F AMNT PAIN NOTED NONE PRSNT: CPT | Mod: S$GLB,,, | Performed by: FAMILY MEDICINE

## 2021-01-22 PROCEDURE — 3078F DIAST BP <80 MM HG: CPT | Mod: CPTII,S$GLB,, | Performed by: FAMILY MEDICINE

## 2021-01-22 PROCEDURE — 99499 RISK ADDL DX/OHS AUDIT: ICD-10-PCS | Mod: S$GLB,,, | Performed by: FAMILY MEDICINE

## 2021-01-22 PROCEDURE — 3288F PR FALLS RISK ASSESSMENT DOCUMENTED: ICD-10-PCS | Mod: CPTII,S$GLB,, | Performed by: FAMILY MEDICINE

## 2021-01-22 PROCEDURE — 1159F MED LIST DOCD IN RCRD: CPT | Mod: S$GLB,,, | Performed by: FAMILY MEDICINE

## 2021-01-22 PROCEDURE — 99214 OFFICE O/P EST MOD 30 MIN: CPT | Mod: S$GLB,,, | Performed by: FAMILY MEDICINE

## 2021-01-22 PROCEDURE — 99999 PR PBB SHADOW E&M-EST. PATIENT-LVL IV: CPT | Mod: PBBFAC,,, | Performed by: FAMILY MEDICINE

## 2021-01-22 PROCEDURE — 1101F PT FALLS ASSESS-DOCD LE1/YR: CPT | Mod: CPTII,S$GLB,, | Performed by: FAMILY MEDICINE

## 2021-02-02 DIAGNOSIS — E11.9 TYPE 2 DIABETES MELLITUS WITHOUT COMPLICATION, WITHOUT LONG-TERM CURRENT USE OF INSULIN: Primary | ICD-10-CM

## 2021-02-02 RX ORDER — IBUPROFEN 200 MG
CAPSULE ORAL
Qty: 100 EACH | Refills: 3 | Status: SHIPPED | OUTPATIENT
Start: 2021-02-02

## 2021-02-06 ENCOUNTER — IMMUNIZATION (OUTPATIENT)
Dept: PRIMARY CARE CLINIC | Facility: CLINIC | Age: 76
End: 2021-02-06
Payer: MEDICARE

## 2021-02-06 DIAGNOSIS — Z23 NEED FOR VACCINATION: Primary | ICD-10-CM

## 2021-02-06 PROCEDURE — 0002A COVID-19, MRNA, LNP-S, PF, 30 MCG/0.3 ML DOSE VACCINE: CPT | Mod: CV19,S$GLB,, | Performed by: FAMILY MEDICINE

## 2021-02-06 PROCEDURE — 91300 COVID-19, MRNA, LNP-S, PF, 30 MCG/0.3 ML DOSE VACCINE: CPT | Mod: S$GLB,,, | Performed by: FAMILY MEDICINE

## 2021-02-06 PROCEDURE — 91300 COVID-19, MRNA, LNP-S, PF, 30 MCG/0.3 ML DOSE VACCINE: ICD-10-PCS | Mod: S$GLB,,, | Performed by: FAMILY MEDICINE

## 2021-02-06 PROCEDURE — 0002A COVID-19, MRNA, LNP-S, PF, 30 MCG/0.3 ML DOSE VACCINE: ICD-10-PCS | Mod: CV19,S$GLB,, | Performed by: FAMILY MEDICINE

## 2021-02-18 ENCOUNTER — TELEPHONE (OUTPATIENT)
Dept: FAMILY MEDICINE | Facility: CLINIC | Age: 76
End: 2021-02-18

## 2021-02-22 ENCOUNTER — OFFICE VISIT (OUTPATIENT)
Dept: FAMILY MEDICINE | Facility: CLINIC | Age: 76
End: 2021-02-22
Payer: MEDICARE

## 2021-02-22 VITALS
WEIGHT: 151.88 LBS | SYSTOLIC BLOOD PRESSURE: 122 MMHG | BODY MASS INDEX: 24.41 KG/M2 | HEART RATE: 80 BPM | RESPIRATION RATE: 14 BRPM | DIASTOLIC BLOOD PRESSURE: 60 MMHG | TEMPERATURE: 98 F | HEIGHT: 66 IN | OXYGEN SATURATION: 97 %

## 2021-02-22 DIAGNOSIS — E04.1 THYROID NODULE: ICD-10-CM

## 2021-02-22 DIAGNOSIS — M47.892 OTHER SPONDYLOSIS, CERVICAL REGION: ICD-10-CM

## 2021-02-22 DIAGNOSIS — M50.30 DDD (DEGENERATIVE DISC DISEASE), CERVICAL: ICD-10-CM

## 2021-02-22 DIAGNOSIS — M62.838 CERVICAL PARASPINAL MUSCLE SPASM: ICD-10-CM

## 2021-02-22 DIAGNOSIS — M25.519 NECK AND SHOULDER PAIN: Primary | ICD-10-CM

## 2021-02-22 DIAGNOSIS — M54.2 NECK AND SHOULDER PAIN: Primary | ICD-10-CM

## 2021-02-22 PROCEDURE — 1101F PT FALLS ASSESS-DOCD LE1/YR: CPT | Mod: CPTII,S$GLB,, | Performed by: PHYSICIAN ASSISTANT

## 2021-02-22 PROCEDURE — 1159F MED LIST DOCD IN RCRD: CPT | Mod: S$GLB,,, | Performed by: PHYSICIAN ASSISTANT

## 2021-02-22 PROCEDURE — 1101F PR PT FALLS ASSESS DOC 0-1 FALLS W/OUT INJ PAST YR: ICD-10-PCS | Mod: CPTII,S$GLB,, | Performed by: PHYSICIAN ASSISTANT

## 2021-02-22 PROCEDURE — 1159F PR MEDICATION LIST DOCUMENTED IN MEDICAL RECORD: ICD-10-PCS | Mod: S$GLB,,, | Performed by: PHYSICIAN ASSISTANT

## 2021-02-22 PROCEDURE — 3288F PR FALLS RISK ASSESSMENT DOCUMENTED: ICD-10-PCS | Mod: CPTII,S$GLB,, | Performed by: PHYSICIAN ASSISTANT

## 2021-02-22 PROCEDURE — 99213 OFFICE O/P EST LOW 20 MIN: CPT | Mod: S$GLB,,, | Performed by: PHYSICIAN ASSISTANT

## 2021-02-22 PROCEDURE — 3074F SYST BP LT 130 MM HG: CPT | Mod: CPTII,S$GLB,, | Performed by: PHYSICIAN ASSISTANT

## 2021-02-22 PROCEDURE — 3074F PR MOST RECENT SYSTOLIC BLOOD PRESSURE < 130 MM HG: ICD-10-PCS | Mod: CPTII,S$GLB,, | Performed by: PHYSICIAN ASSISTANT

## 2021-02-22 PROCEDURE — 1125F PR PAIN SEVERITY QUANTIFIED, PAIN PRESENT: ICD-10-PCS | Mod: S$GLB,,, | Performed by: PHYSICIAN ASSISTANT

## 2021-02-22 PROCEDURE — 3078F DIAST BP <80 MM HG: CPT | Mod: CPTII,S$GLB,, | Performed by: PHYSICIAN ASSISTANT

## 2021-02-22 PROCEDURE — 99213 PR OFFICE/OUTPT VISIT, EST, LEVL III, 20-29 MIN: ICD-10-PCS | Mod: S$GLB,,, | Performed by: PHYSICIAN ASSISTANT

## 2021-02-22 PROCEDURE — 1125F AMNT PAIN NOTED PAIN PRSNT: CPT | Mod: S$GLB,,, | Performed by: PHYSICIAN ASSISTANT

## 2021-02-22 PROCEDURE — 99999 PR PBB SHADOW E&M-EST. PATIENT-LVL V: ICD-10-PCS | Mod: PBBFAC,,, | Performed by: PHYSICIAN ASSISTANT

## 2021-02-22 PROCEDURE — 3288F FALL RISK ASSESSMENT DOCD: CPT | Mod: CPTII,S$GLB,, | Performed by: PHYSICIAN ASSISTANT

## 2021-02-22 PROCEDURE — 99999 PR PBB SHADOW E&M-EST. PATIENT-LVL V: CPT | Mod: PBBFAC,,, | Performed by: PHYSICIAN ASSISTANT

## 2021-02-22 PROCEDURE — 3078F PR MOST RECENT DIASTOLIC BLOOD PRESSURE < 80 MM HG: ICD-10-PCS | Mod: CPTII,S$GLB,, | Performed by: PHYSICIAN ASSISTANT

## 2021-02-22 RX ORDER — AMOXICILLIN 500 MG/1
500 CAPSULE ORAL 3 TIMES DAILY
COMMUNITY
End: 2021-03-05

## 2021-03-01 ENCOUNTER — TELEPHONE (OUTPATIENT)
Dept: FAMILY MEDICINE | Facility: CLINIC | Age: 76
End: 2021-03-01

## 2021-03-01 ENCOUNTER — HOSPITAL ENCOUNTER (OUTPATIENT)
Dept: RADIOLOGY | Facility: HOSPITAL | Age: 76
Discharge: HOME | End: 2021-03-01
Attending: PHYSICIAN ASSISTANT
Payer: MEDICARE

## 2021-03-01 DIAGNOSIS — M47.892 OTHER SPONDYLOSIS, CERVICAL REGION: ICD-10-CM

## 2021-03-01 PROCEDURE — 72141 MRI NECK SPINE W/O DYE: CPT | Mod: TC

## 2021-03-01 PROCEDURE — 72141 MRI CERVICAL SPINE WITHOUT CONTRAST: ICD-10-PCS | Mod: 26,,, | Performed by: RADIOLOGY

## 2021-03-01 PROCEDURE — 72141 MRI NECK SPINE W/O DYE: CPT | Mod: 26,,, | Performed by: RADIOLOGY

## 2021-03-03 ENCOUNTER — TELEPHONE (OUTPATIENT)
Dept: PAIN MEDICINE | Facility: CLINIC | Age: 76
End: 2021-03-03

## 2021-03-03 ENCOUNTER — OFFICE VISIT (OUTPATIENT)
Dept: SPINE | Facility: CLINIC | Age: 76
End: 2021-03-03
Payer: MEDICARE

## 2021-03-03 VITALS — WEIGHT: 151.88 LBS | HEIGHT: 66 IN | BODY MASS INDEX: 24.41 KG/M2

## 2021-03-03 DIAGNOSIS — M50.30 DDD (DEGENERATIVE DISC DISEASE), CERVICAL: ICD-10-CM

## 2021-03-03 PROCEDURE — 1125F PR PAIN SEVERITY QUANTIFIED, PAIN PRESENT: ICD-10-PCS | Mod: S$GLB,,, | Performed by: PHYSICAL MEDICINE & REHABILITATION

## 2021-03-03 PROCEDURE — 3288F FALL RISK ASSESSMENT DOCD: CPT | Mod: CPTII,S$GLB,, | Performed by: PHYSICAL MEDICINE & REHABILITATION

## 2021-03-03 PROCEDURE — 3288F PR FALLS RISK ASSESSMENT DOCUMENTED: ICD-10-PCS | Mod: CPTII,S$GLB,, | Performed by: PHYSICAL MEDICINE & REHABILITATION

## 2021-03-03 PROCEDURE — 99204 PR OFFICE/OUTPT VISIT, NEW, LEVL IV, 45-59 MIN: ICD-10-PCS | Mod: S$GLB,,, | Performed by: PHYSICAL MEDICINE & REHABILITATION

## 2021-03-03 PROCEDURE — 1125F AMNT PAIN NOTED PAIN PRSNT: CPT | Mod: S$GLB,,, | Performed by: PHYSICAL MEDICINE & REHABILITATION

## 2021-03-03 PROCEDURE — 99204 OFFICE O/P NEW MOD 45 MIN: CPT | Mod: S$GLB,,, | Performed by: PHYSICAL MEDICINE & REHABILITATION

## 2021-03-03 PROCEDURE — 1101F PT FALLS ASSESS-DOCD LE1/YR: CPT | Mod: CPTII,S$GLB,, | Performed by: PHYSICAL MEDICINE & REHABILITATION

## 2021-03-03 PROCEDURE — 1159F PR MEDICATION LIST DOCUMENTED IN MEDICAL RECORD: ICD-10-PCS | Mod: S$GLB,,, | Performed by: PHYSICAL MEDICINE & REHABILITATION

## 2021-03-03 PROCEDURE — 1159F MED LIST DOCD IN RCRD: CPT | Mod: S$GLB,,, | Performed by: PHYSICAL MEDICINE & REHABILITATION

## 2021-03-03 PROCEDURE — 1101F PR PT FALLS ASSESS DOC 0-1 FALLS W/OUT INJ PAST YR: ICD-10-PCS | Mod: CPTII,S$GLB,, | Performed by: PHYSICAL MEDICINE & REHABILITATION

## 2021-03-04 ENCOUNTER — TELEPHONE (OUTPATIENT)
Dept: PAIN MEDICINE | Facility: CLINIC | Age: 76
End: 2021-03-04

## 2021-03-04 ENCOUNTER — HOSPITAL ENCOUNTER (OUTPATIENT)
Dept: RADIOLOGY | Facility: HOSPITAL | Age: 76
Discharge: HOME | End: 2021-03-04
Attending: PHYSICIAN ASSISTANT
Payer: MEDICARE

## 2021-03-04 DIAGNOSIS — E04.1 THYROID NODULE: ICD-10-CM

## 2021-03-04 DIAGNOSIS — M54.12 CERVICAL RADICULITIS: Primary | ICD-10-CM

## 2021-03-04 PROCEDURE — 76536 US EXAM OF HEAD AND NECK: CPT | Mod: 26,,, | Performed by: RADIOLOGY

## 2021-03-04 PROCEDURE — 76536 US SOFT TISSUE HEAD NECK THYROID: ICD-10-PCS | Mod: 26,,, | Performed by: RADIOLOGY

## 2021-03-04 PROCEDURE — 76536 US EXAM OF HEAD AND NECK: CPT | Mod: TC

## 2021-03-05 ENCOUNTER — OFFICE VISIT (OUTPATIENT)
Dept: FAMILY MEDICINE | Facility: CLINIC | Age: 76
End: 2021-03-05
Payer: MEDICARE

## 2021-03-05 ENCOUNTER — LAB VISIT (OUTPATIENT)
Dept: LAB | Facility: HOSPITAL | Age: 76
End: 2021-03-05
Attending: NURSE PRACTITIONER
Payer: MEDICARE

## 2021-03-05 VITALS
HEART RATE: 85 BPM | BODY MASS INDEX: 24.55 KG/M2 | TEMPERATURE: 98 F | DIASTOLIC BLOOD PRESSURE: 78 MMHG | WEIGHT: 152.75 LBS | HEIGHT: 66 IN | SYSTOLIC BLOOD PRESSURE: 128 MMHG | OXYGEN SATURATION: 97 %

## 2021-03-05 DIAGNOSIS — J32.9 CHRONIC SINUSITIS, UNSPECIFIED LOCATION: ICD-10-CM

## 2021-03-05 DIAGNOSIS — I15.2 HYPERTENSION ASSOCIATED WITH DIABETES: ICD-10-CM

## 2021-03-05 DIAGNOSIS — E11.59 HYPERTENSION ASSOCIATED WITH DIABETES: ICD-10-CM

## 2021-03-05 DIAGNOSIS — J32.9 CHRONIC SINUSITIS, UNSPECIFIED LOCATION: Primary | ICD-10-CM

## 2021-03-05 LAB
CREAT SERPL-MCNC: 1 MG/DL (ref 0.5–1.4)
EST. GFR  (AFRICAN AMERICAN): >60 ML/MIN/1.73 M^2
EST. GFR  (NON AFRICAN AMERICAN): 54 ML/MIN/1.73 M^2

## 2021-03-05 PROCEDURE — 3288F FALL RISK ASSESSMENT DOCD: CPT | Mod: CPTII,S$GLB,, | Performed by: NURSE PRACTITIONER

## 2021-03-05 PROCEDURE — 3288F PR FALLS RISK ASSESSMENT DOCUMENTED: ICD-10-PCS | Mod: CPTII,S$GLB,, | Performed by: NURSE PRACTITIONER

## 2021-03-05 PROCEDURE — 3074F PR MOST RECENT SYSTOLIC BLOOD PRESSURE < 130 MM HG: ICD-10-PCS | Mod: CPTII,S$GLB,, | Performed by: NURSE PRACTITIONER

## 2021-03-05 PROCEDURE — 82565 ASSAY OF CREATININE: CPT | Performed by: NURSE PRACTITIONER

## 2021-03-05 PROCEDURE — 1170F PR FUNCTIONAL STATUS ASSESSED: ICD-10-PCS | Mod: S$GLB,,, | Performed by: NURSE PRACTITIONER

## 2021-03-05 PROCEDURE — 36415 COLL VENOUS BLD VENIPUNCTURE: CPT | Performed by: NURSE PRACTITIONER

## 2021-03-05 PROCEDURE — 1170F FXNL STATUS ASSESSED: CPT | Mod: S$GLB,,, | Performed by: NURSE PRACTITIONER

## 2021-03-05 PROCEDURE — 3078F DIAST BP <80 MM HG: CPT | Mod: CPTII,S$GLB,, | Performed by: NURSE PRACTITIONER

## 2021-03-05 PROCEDURE — 3074F SYST BP LT 130 MM HG: CPT | Mod: CPTII,S$GLB,, | Performed by: NURSE PRACTITIONER

## 2021-03-05 PROCEDURE — 99999 PR PBB SHADOW E&M-EST. PATIENT-LVL V: CPT | Mod: PBBFAC,,, | Performed by: NURSE PRACTITIONER

## 2021-03-05 PROCEDURE — 1101F PT FALLS ASSESS-DOCD LE1/YR: CPT | Mod: CPTII,S$GLB,, | Performed by: NURSE PRACTITIONER

## 2021-03-05 PROCEDURE — 99214 OFFICE O/P EST MOD 30 MIN: CPT | Mod: S$GLB,,, | Performed by: NURSE PRACTITIONER

## 2021-03-05 PROCEDURE — 1159F PR MEDICATION LIST DOCUMENTED IN MEDICAL RECORD: ICD-10-PCS | Mod: S$GLB,,, | Performed by: NURSE PRACTITIONER

## 2021-03-05 PROCEDURE — 1125F AMNT PAIN NOTED PAIN PRSNT: CPT | Mod: S$GLB,,, | Performed by: NURSE PRACTITIONER

## 2021-03-05 PROCEDURE — 99999 PR PBB SHADOW E&M-EST. PATIENT-LVL V: ICD-10-PCS | Mod: PBBFAC,,, | Performed by: NURSE PRACTITIONER

## 2021-03-05 PROCEDURE — 1159F MED LIST DOCD IN RCRD: CPT | Mod: S$GLB,,, | Performed by: NURSE PRACTITIONER

## 2021-03-05 PROCEDURE — 1125F PR PAIN SEVERITY QUANTIFIED, PAIN PRESENT: ICD-10-PCS | Mod: S$GLB,,, | Performed by: NURSE PRACTITIONER

## 2021-03-05 PROCEDURE — 1101F PR PT FALLS ASSESS DOC 0-1 FALLS W/OUT INJ PAST YR: ICD-10-PCS | Mod: CPTII,S$GLB,, | Performed by: NURSE PRACTITIONER

## 2021-03-05 PROCEDURE — 99214 PR OFFICE/OUTPT VISIT, EST, LEVL IV, 30-39 MIN: ICD-10-PCS | Mod: S$GLB,,, | Performed by: NURSE PRACTITIONER

## 2021-03-05 PROCEDURE — 3078F PR MOST RECENT DIASTOLIC BLOOD PRESSURE < 80 MM HG: ICD-10-PCS | Mod: CPTII,S$GLB,, | Performed by: NURSE PRACTITIONER

## 2021-03-05 RX ORDER — CETIRIZINE HYDROCHLORIDE 10 MG/1
10 TABLET ORAL DAILY
COMMUNITY
End: 2021-05-18 | Stop reason: CLARIF

## 2021-03-05 RX ORDER — AZELASTINE 1 MG/ML
1 SPRAY, METERED NASAL 2 TIMES DAILY
Qty: 30 ML | Refills: 2 | Status: SHIPPED | OUTPATIENT
Start: 2021-03-05 | End: 2021-05-14

## 2021-03-05 RX ORDER — FLUTICASONE PROPIONATE 50 MCG
1 SPRAY, SUSPENSION (ML) NASAL DAILY
Qty: 16 G | Refills: 3 | Status: CANCELLED | OUTPATIENT
Start: 2021-03-05

## 2021-03-09 ENCOUNTER — TELEPHONE (OUTPATIENT)
Dept: FAMILY MEDICINE | Facility: CLINIC | Age: 76
End: 2021-03-09

## 2021-03-11 ENCOUNTER — HOSPITAL ENCOUNTER (OUTPATIENT)
Dept: RADIOLOGY | Facility: HOSPITAL | Age: 76
Discharge: HOME | End: 2021-03-11
Attending: NURSE PRACTITIONER
Payer: MEDICARE

## 2021-03-11 DIAGNOSIS — J32.9 CHRONIC SINUSITIS, UNSPECIFIED LOCATION: ICD-10-CM

## 2021-03-11 PROCEDURE — 70486 CT MAXILLOFACIAL W/O DYE: CPT | Mod: TC

## 2021-03-11 PROCEDURE — 70486 CT SINUSES WITHOUT CONTRAST: ICD-10-PCS | Mod: 26,,, | Performed by: RADIOLOGY

## 2021-03-11 PROCEDURE — 70486 CT MAXILLOFACIAL W/O DYE: CPT | Mod: 26,,, | Performed by: RADIOLOGY

## 2021-03-15 ENCOUNTER — OFFICE VISIT (OUTPATIENT)
Dept: OTOLARYNGOLOGY | Facility: CLINIC | Age: 76
End: 2021-03-15
Payer: MEDICARE

## 2021-03-15 VITALS
HEART RATE: 84 BPM | DIASTOLIC BLOOD PRESSURE: 69 MMHG | OXYGEN SATURATION: 97 % | WEIGHT: 154.75 LBS | SYSTOLIC BLOOD PRESSURE: 135 MMHG | HEIGHT: 66 IN | BODY MASS INDEX: 24.87 KG/M2

## 2021-03-15 DIAGNOSIS — R09.82 POSTNASAL DRIP: Primary | ICD-10-CM

## 2021-03-15 PROCEDURE — 1101F PR PT FALLS ASSESS DOC 0-1 FALLS W/OUT INJ PAST YR: ICD-10-PCS | Mod: CPTII,S$GLB,, | Performed by: OTOLARYNGOLOGY

## 2021-03-15 PROCEDURE — 3078F DIAST BP <80 MM HG: CPT | Mod: CPTII,S$GLB,, | Performed by: OTOLARYNGOLOGY

## 2021-03-15 PROCEDURE — 3288F PR FALLS RISK ASSESSMENT DOCUMENTED: ICD-10-PCS | Mod: CPTII,S$GLB,, | Performed by: OTOLARYNGOLOGY

## 2021-03-15 PROCEDURE — 1126F PR PAIN SEVERITY QUANTIFIED, NO PAIN PRESENT: ICD-10-PCS | Mod: S$GLB,,, | Performed by: OTOLARYNGOLOGY

## 2021-03-15 PROCEDURE — 1101F PT FALLS ASSESS-DOCD LE1/YR: CPT | Mod: CPTII,S$GLB,, | Performed by: OTOLARYNGOLOGY

## 2021-03-15 PROCEDURE — 3075F PR MOST RECENT SYSTOLIC BLOOD PRESS GE 130-139MM HG: ICD-10-PCS | Mod: CPTII,S$GLB,, | Performed by: OTOLARYNGOLOGY

## 2021-03-15 PROCEDURE — 1126F AMNT PAIN NOTED NONE PRSNT: CPT | Mod: S$GLB,,, | Performed by: OTOLARYNGOLOGY

## 2021-03-15 PROCEDURE — 3078F PR MOST RECENT DIASTOLIC BLOOD PRESSURE < 80 MM HG: ICD-10-PCS | Mod: CPTII,S$GLB,, | Performed by: OTOLARYNGOLOGY

## 2021-03-15 PROCEDURE — 99213 OFFICE O/P EST LOW 20 MIN: CPT | Mod: S$GLB,,, | Performed by: OTOLARYNGOLOGY

## 2021-03-15 PROCEDURE — 3075F SYST BP GE 130 - 139MM HG: CPT | Mod: CPTII,S$GLB,, | Performed by: OTOLARYNGOLOGY

## 2021-03-15 PROCEDURE — 1159F PR MEDICATION LIST DOCUMENTED IN MEDICAL RECORD: ICD-10-PCS | Mod: S$GLB,,, | Performed by: OTOLARYNGOLOGY

## 2021-03-15 PROCEDURE — 1159F MED LIST DOCD IN RCRD: CPT | Mod: S$GLB,,, | Performed by: OTOLARYNGOLOGY

## 2021-03-15 PROCEDURE — 3288F FALL RISK ASSESSMENT DOCD: CPT | Mod: CPTII,S$GLB,, | Performed by: OTOLARYNGOLOGY

## 2021-03-15 PROCEDURE — 99213 PR OFFICE/OUTPT VISIT, EST, LEVL III, 20-29 MIN: ICD-10-PCS | Mod: S$GLB,,, | Performed by: OTOLARYNGOLOGY

## 2021-03-15 RX ORDER — GUAIFENESIN 600 MG/1
1200 TABLET, EXTENDED RELEASE ORAL 2 TIMES DAILY
Qty: 60 TABLET | Refills: 3 | Status: SHIPPED | OUTPATIENT
Start: 2021-03-15 | End: 2021-03-30

## 2021-03-25 ENCOUNTER — TELEPHONE (OUTPATIENT)
Dept: PAIN MEDICINE | Facility: CLINIC | Age: 76
End: 2021-03-25

## 2021-03-29 ENCOUNTER — OFFICE VISIT (OUTPATIENT)
Dept: FAMILY MEDICINE | Facility: CLINIC | Age: 76
End: 2021-03-29
Payer: MEDICARE

## 2021-03-29 VITALS
WEIGHT: 155.19 LBS | OXYGEN SATURATION: 100 % | HEIGHT: 66 IN | TEMPERATURE: 98 F | HEART RATE: 89 BPM | SYSTOLIC BLOOD PRESSURE: 120 MMHG | DIASTOLIC BLOOD PRESSURE: 66 MMHG | BODY MASS INDEX: 24.94 KG/M2

## 2021-03-29 DIAGNOSIS — E04.1 THYROID NODULE: Primary | ICD-10-CM

## 2021-03-29 DIAGNOSIS — I15.2 HYPERTENSION ASSOCIATED WITH DIABETES: ICD-10-CM

## 2021-03-29 DIAGNOSIS — J32.9 CHRONIC SINUSITIS, UNSPECIFIED LOCATION: ICD-10-CM

## 2021-03-29 DIAGNOSIS — E11.59 HYPERTENSION ASSOCIATED WITH DIABETES: ICD-10-CM

## 2021-03-29 PROCEDURE — 1170F FXNL STATUS ASSESSED: CPT | Mod: S$GLB,,, | Performed by: NURSE PRACTITIONER

## 2021-03-29 PROCEDURE — 3074F SYST BP LT 130 MM HG: CPT | Mod: CPTII,S$GLB,, | Performed by: NURSE PRACTITIONER

## 2021-03-29 PROCEDURE — 99999 PR PBB SHADOW E&M-EST. PATIENT-LVL V: CPT | Mod: PBBFAC,,, | Performed by: NURSE PRACTITIONER

## 2021-03-29 PROCEDURE — 1125F AMNT PAIN NOTED PAIN PRSNT: CPT | Mod: S$GLB,,, | Performed by: NURSE PRACTITIONER

## 2021-03-29 PROCEDURE — 3288F FALL RISK ASSESSMENT DOCD: CPT | Mod: CPTII,S$GLB,, | Performed by: NURSE PRACTITIONER

## 2021-03-29 PROCEDURE — 1125F PR PAIN SEVERITY QUANTIFIED, PAIN PRESENT: ICD-10-PCS | Mod: S$GLB,,, | Performed by: NURSE PRACTITIONER

## 2021-03-29 PROCEDURE — 1159F MED LIST DOCD IN RCRD: CPT | Mod: S$GLB,,, | Performed by: NURSE PRACTITIONER

## 2021-03-29 PROCEDURE — 3074F PR MOST RECENT SYSTOLIC BLOOD PRESSURE < 130 MM HG: ICD-10-PCS | Mod: CPTII,S$GLB,, | Performed by: NURSE PRACTITIONER

## 2021-03-29 PROCEDURE — 99214 OFFICE O/P EST MOD 30 MIN: CPT | Mod: S$GLB,,, | Performed by: NURSE PRACTITIONER

## 2021-03-29 PROCEDURE — 3288F PR FALLS RISK ASSESSMENT DOCUMENTED: ICD-10-PCS | Mod: CPTII,S$GLB,, | Performed by: NURSE PRACTITIONER

## 2021-03-29 PROCEDURE — 1101F PR PT FALLS ASSESS DOC 0-1 FALLS W/OUT INJ PAST YR: ICD-10-PCS | Mod: CPTII,S$GLB,, | Performed by: NURSE PRACTITIONER

## 2021-03-29 PROCEDURE — 1101F PT FALLS ASSESS-DOCD LE1/YR: CPT | Mod: CPTII,S$GLB,, | Performed by: NURSE PRACTITIONER

## 2021-03-29 PROCEDURE — 1170F PR FUNCTIONAL STATUS ASSESSED: ICD-10-PCS | Mod: S$GLB,,, | Performed by: NURSE PRACTITIONER

## 2021-03-29 PROCEDURE — 3078F PR MOST RECENT DIASTOLIC BLOOD PRESSURE < 80 MM HG: ICD-10-PCS | Mod: CPTII,S$GLB,, | Performed by: NURSE PRACTITIONER

## 2021-03-29 PROCEDURE — 1159F PR MEDICATION LIST DOCUMENTED IN MEDICAL RECORD: ICD-10-PCS | Mod: S$GLB,,, | Performed by: NURSE PRACTITIONER

## 2021-03-29 PROCEDURE — 99214 PR OFFICE/OUTPT VISIT, EST, LEVL IV, 30-39 MIN: ICD-10-PCS | Mod: S$GLB,,, | Performed by: NURSE PRACTITIONER

## 2021-03-29 PROCEDURE — 3078F DIAST BP <80 MM HG: CPT | Mod: CPTII,S$GLB,, | Performed by: NURSE PRACTITIONER

## 2021-03-29 PROCEDURE — 99999 PR PBB SHADOW E&M-EST. PATIENT-LVL V: ICD-10-PCS | Mod: PBBFAC,,, | Performed by: NURSE PRACTITIONER

## 2021-03-30 ENCOUNTER — OFFICE VISIT (OUTPATIENT)
Dept: ENDOCRINOLOGY | Facility: CLINIC | Age: 76
End: 2021-03-30
Payer: MEDICARE

## 2021-03-30 VITALS
RESPIRATION RATE: 18 BRPM | OXYGEN SATURATION: 96 % | WEIGHT: 155 LBS | BODY MASS INDEX: 25.01 KG/M2 | TEMPERATURE: 98 F | DIASTOLIC BLOOD PRESSURE: 57 MMHG | HEART RATE: 81 BPM | SYSTOLIC BLOOD PRESSURE: 136 MMHG

## 2021-03-30 DIAGNOSIS — E04.2 MULTIPLE THYROID NODULES: Primary | ICD-10-CM

## 2021-03-30 DIAGNOSIS — E04.1 THYROID NODULE: ICD-10-CM

## 2021-03-30 DIAGNOSIS — M81.0 AGE-RELATED OSTEOPOROSIS WITHOUT CURRENT PATHOLOGICAL FRACTURE: ICD-10-CM

## 2021-03-30 PROCEDURE — 1101F PT FALLS ASSESS-DOCD LE1/YR: CPT | Mod: CPTII,S$GLB,, | Performed by: INTERNAL MEDICINE

## 2021-03-30 PROCEDURE — 99999 PR PBB SHADOW E&M-EST. PATIENT-LVL V: CPT | Mod: PBBFAC,,, | Performed by: INTERNAL MEDICINE

## 2021-03-30 PROCEDURE — 3288F FALL RISK ASSESSMENT DOCD: CPT | Mod: CPTII,S$GLB,, | Performed by: INTERNAL MEDICINE

## 2021-03-30 PROCEDURE — 99999 PR PBB SHADOW E&M-EST. PATIENT-LVL V: ICD-10-PCS | Mod: PBBFAC,,, | Performed by: INTERNAL MEDICINE

## 2021-03-30 PROCEDURE — 1125F AMNT PAIN NOTED PAIN PRSNT: CPT | Mod: S$GLB,,, | Performed by: INTERNAL MEDICINE

## 2021-03-30 PROCEDURE — 3288F PR FALLS RISK ASSESSMENT DOCUMENTED: ICD-10-PCS | Mod: CPTII,S$GLB,, | Performed by: INTERNAL MEDICINE

## 2021-03-30 PROCEDURE — 1101F PR PT FALLS ASSESS DOC 0-1 FALLS W/OUT INJ PAST YR: ICD-10-PCS | Mod: CPTII,S$GLB,, | Performed by: INTERNAL MEDICINE

## 2021-03-30 PROCEDURE — 1125F PR PAIN SEVERITY QUANTIFIED, PAIN PRESENT: ICD-10-PCS | Mod: S$GLB,,, | Performed by: INTERNAL MEDICINE

## 2021-03-30 PROCEDURE — 99214 PR OFFICE/OUTPT VISIT, EST, LEVL IV, 30-39 MIN: ICD-10-PCS | Mod: S$GLB,,, | Performed by: INTERNAL MEDICINE

## 2021-03-30 PROCEDURE — 99214 OFFICE O/P EST MOD 30 MIN: CPT | Mod: S$GLB,,, | Performed by: INTERNAL MEDICINE

## 2021-03-31 ENCOUNTER — TELEPHONE (OUTPATIENT)
Dept: ENDOCRINOLOGY | Facility: CLINIC | Age: 76
End: 2021-03-31

## 2021-04-05 ENCOUNTER — TELEPHONE (OUTPATIENT)
Dept: PAIN MEDICINE | Facility: CLINIC | Age: 76
End: 2021-04-05

## 2021-04-06 ENCOUNTER — TELEPHONE (OUTPATIENT)
Dept: OPHTHALMOLOGY | Facility: CLINIC | Age: 76
End: 2021-04-06

## 2021-04-12 ENCOUNTER — TELEPHONE (OUTPATIENT)
Dept: PAIN MEDICINE | Facility: CLINIC | Age: 76
End: 2021-04-12

## 2021-04-15 ENCOUNTER — OFFICE VISIT (OUTPATIENT)
Dept: OPHTHALMOLOGY | Facility: CLINIC | Age: 76
End: 2021-04-15
Payer: MEDICARE

## 2021-04-15 DIAGNOSIS — H04.123 DRY EYE SYNDROME, BILATERAL: Primary | ICD-10-CM

## 2021-04-15 DIAGNOSIS — H40.013 OPEN ANGLE WITH BORDERLINE FINDINGS AND LOW GLAUCOMA RISK IN BOTH EYES: ICD-10-CM

## 2021-04-15 DIAGNOSIS — H25.813 COMBINED FORMS OF AGE-RELATED CATARACT, BILATERAL: ICD-10-CM

## 2021-04-15 PROCEDURE — 1126F AMNT PAIN NOTED NONE PRSNT: CPT | Mod: S$GLB,,, | Performed by: OPHTHALMOLOGY

## 2021-04-15 PROCEDURE — 3288F FALL RISK ASSESSMENT DOCD: CPT | Mod: CPTII,S$GLB,, | Performed by: OPHTHALMOLOGY

## 2021-04-15 PROCEDURE — 99999 PR PBB SHADOW E&M-EST. PATIENT-LVL III: CPT | Mod: PBBFAC,,, | Performed by: OPHTHALMOLOGY

## 2021-04-15 PROCEDURE — 1101F PT FALLS ASSESS-DOCD LE1/YR: CPT | Mod: CPTII,S$GLB,, | Performed by: OPHTHALMOLOGY

## 2021-04-15 PROCEDURE — 3288F PR FALLS RISK ASSESSMENT DOCUMENTED: ICD-10-PCS | Mod: CPTII,S$GLB,, | Performed by: OPHTHALMOLOGY

## 2021-04-15 PROCEDURE — 99999 PR PBB SHADOW E&M-EST. PATIENT-LVL III: ICD-10-PCS | Mod: PBBFAC,,, | Performed by: OPHTHALMOLOGY

## 2021-04-15 PROCEDURE — 1126F PR PAIN SEVERITY QUANTIFIED, NO PAIN PRESENT: ICD-10-PCS | Mod: S$GLB,,, | Performed by: OPHTHALMOLOGY

## 2021-04-15 PROCEDURE — 92133 POSTERIOR SEGMENT OCT OPTIC NERVE(OCULAR COHERENCE TOMOGRAPHY) - OU - BOTH EYES: ICD-10-PCS | Mod: S$GLB,,, | Performed by: OPHTHALMOLOGY

## 2021-04-15 PROCEDURE — 92014 PR EYE EXAM, EST PATIENT,COMPREHESV: ICD-10-PCS | Mod: S$GLB,,, | Performed by: OPHTHALMOLOGY

## 2021-04-15 PROCEDURE — 92014 COMPRE OPH EXAM EST PT 1/>: CPT | Mod: S$GLB,,, | Performed by: OPHTHALMOLOGY

## 2021-04-15 PROCEDURE — 1101F PR PT FALLS ASSESS DOC 0-1 FALLS W/OUT INJ PAST YR: ICD-10-PCS | Mod: CPTII,S$GLB,, | Performed by: OPHTHALMOLOGY

## 2021-04-15 PROCEDURE — 92133 CPTRZD OPH DX IMG PST SGM ON: CPT | Mod: S$GLB,,, | Performed by: OPHTHALMOLOGY

## 2021-04-18 DIAGNOSIS — K58.1 IRRITABLE BOWEL SYNDROME WITH CONSTIPATION: ICD-10-CM

## 2021-04-19 RX ORDER — DOXEPIN HYDROCHLORIDE 10 MG/1
10 CAPSULE ORAL NIGHTLY
Qty: 90 CAPSULE | Refills: 2 | Status: SHIPPED | OUTPATIENT
Start: 2021-04-19 | End: 2021-08-10 | Stop reason: SINTOL

## 2021-04-21 ENCOUNTER — LAB VISIT (OUTPATIENT)
Dept: LAB | Facility: HOSPITAL | Age: 76
End: 2021-04-21
Attending: SURGERY
Payer: MEDICARE

## 2021-04-21 DIAGNOSIS — E11.9 TYPE 2 DIABETES MELLITUS WITHOUT COMPLICATION, WITHOUT LONG-TERM CURRENT USE OF INSULIN: ICD-10-CM

## 2021-04-21 DIAGNOSIS — M81.0 AGE-RELATED OSTEOPOROSIS WITHOUT CURRENT PATHOLOGICAL FRACTURE: ICD-10-CM

## 2021-04-21 DIAGNOSIS — I15.2 HYPERTENSION ASSOCIATED WITH DIABETES: ICD-10-CM

## 2021-04-21 DIAGNOSIS — Z01.818 PREOP TESTING: ICD-10-CM

## 2021-04-21 DIAGNOSIS — N18.31 STAGE 3A CHRONIC KIDNEY DISEASE: ICD-10-CM

## 2021-04-21 DIAGNOSIS — Z11.59 NEED FOR HEPATITIS C SCREENING TEST: ICD-10-CM

## 2021-04-21 DIAGNOSIS — E11.59 HYPERTENSION ASSOCIATED WITH DIABETES: ICD-10-CM

## 2021-04-21 LAB
ALBUMIN SERPL BCP-MCNC: 3.7 G/DL (ref 3.5–5.2)
ALP SERPL-CCNC: 46 U/L (ref 55–135)
ALT SERPL W/O P-5'-P-CCNC: 14 U/L (ref 10–44)
ANION GAP SERPL CALC-SCNC: 6 MMOL/L (ref 8–16)
ANION GAP SERPL CALC-SCNC: 6 MMOL/L (ref 8–16)
AST SERPL-CCNC: 18 U/L (ref 10–40)
BILIRUB SERPL-MCNC: 0.6 MG/DL (ref 0.1–1)
BUN SERPL-MCNC: 16 MG/DL (ref 8–23)
BUN SERPL-MCNC: 16 MG/DL (ref 8–23)
CALCIUM SERPL-MCNC: 9.3 MG/DL (ref 8.7–10.5)
CALCIUM SERPL-MCNC: 9.3 MG/DL (ref 8.7–10.5)
CHLORIDE SERPL-SCNC: 109 MMOL/L (ref 95–110)
CHLORIDE SERPL-SCNC: 109 MMOL/L (ref 95–110)
CO2 SERPL-SCNC: 28 MMOL/L (ref 23–29)
CO2 SERPL-SCNC: 28 MMOL/L (ref 23–29)
CREAT SERPL-MCNC: 0.9 MG/DL (ref 0.5–1.4)
CREAT SERPL-MCNC: 0.9 MG/DL (ref 0.5–1.4)
EST. GFR  (AFRICAN AMERICAN): >60 ML/MIN/1.73 M^2
EST. GFR  (AFRICAN AMERICAN): >60 ML/MIN/1.73 M^2
EST. GFR  (NON AFRICAN AMERICAN): >60 ML/MIN/1.73 M^2
EST. GFR  (NON AFRICAN AMERICAN): >60 ML/MIN/1.73 M^2
GLUCOSE SERPL-MCNC: 101 MG/DL (ref 70–110)
GLUCOSE SERPL-MCNC: 101 MG/DL (ref 70–110)
POTASSIUM SERPL-SCNC: 4.5 MMOL/L (ref 3.5–5.1)
POTASSIUM SERPL-SCNC: 4.5 MMOL/L (ref 3.5–5.1)
PROT SERPL-MCNC: 7.2 G/DL (ref 6–8.4)
SODIUM SERPL-SCNC: 143 MMOL/L (ref 136–145)
SODIUM SERPL-SCNC: 143 MMOL/L (ref 136–145)

## 2021-04-21 PROCEDURE — 82306 VITAMIN D 25 HYDROXY: CPT | Performed by: INTERNAL MEDICINE

## 2021-04-21 PROCEDURE — 83036 HEMOGLOBIN GLYCOSYLATED A1C: CPT | Performed by: FAMILY MEDICINE

## 2021-04-21 PROCEDURE — 36415 COLL VENOUS BLD VENIPUNCTURE: CPT | Mod: PO | Performed by: INTERNAL MEDICINE

## 2021-04-21 PROCEDURE — 80053 COMPREHEN METABOLIC PANEL: CPT | Performed by: INTERNAL MEDICINE

## 2021-04-21 PROCEDURE — 85025 COMPLETE CBC W/AUTO DIFF WBC: CPT | Performed by: SURGERY

## 2021-04-21 PROCEDURE — 86803 HEPATITIS C AB TEST: CPT | Performed by: FAMILY MEDICINE

## 2021-04-22 LAB
25(OH)D3+25(OH)D2 SERPL-MCNC: 11 NG/ML (ref 30–96)
BASOPHILS # BLD AUTO: 0.06 K/UL (ref 0–0.2)
BASOPHILS NFR BLD: 1.2 % (ref 0–1.9)
DIFFERENTIAL METHOD: ABNORMAL
EOSINOPHIL # BLD AUTO: 0.3 K/UL (ref 0–0.5)
EOSINOPHIL NFR BLD: 6.6 % (ref 0–8)
ERYTHROCYTE [DISTWIDTH] IN BLOOD BY AUTOMATED COUNT: 12.9 % (ref 11.5–14.5)
ESTIMATED AVG GLUCOSE: 123 MG/DL (ref 68–131)
HBA1C MFR BLD: 5.9 % (ref 4–5.6)
HCT VFR BLD AUTO: 39 % (ref 37–48.5)
HCV AB SERPL QL IA: NEGATIVE
HGB BLD-MCNC: 12.3 G/DL (ref 12–16)
IMM GRANULOCYTES # BLD AUTO: 0.01 K/UL (ref 0–0.04)
IMM GRANULOCYTES NFR BLD AUTO: 0.2 % (ref 0–0.5)
LYMPHOCYTES # BLD AUTO: 2.1 K/UL (ref 1–4.8)
LYMPHOCYTES NFR BLD: 43.9 % (ref 18–48)
MCH RBC QN AUTO: 29.6 PG (ref 27–31)
MCHC RBC AUTO-ENTMCNC: 31.5 G/DL (ref 32–36)
MCV RBC AUTO: 94 FL (ref 82–98)
MONOCYTES # BLD AUTO: 0.3 K/UL (ref 0.3–1)
MONOCYTES NFR BLD: 5.7 % (ref 4–15)
NEUTROPHILS # BLD AUTO: 2.1 K/UL (ref 1.8–7.7)
NEUTROPHILS NFR BLD: 42.4 % (ref 38–73)
NRBC BLD-RTO: 0 /100 WBC
PLATELET # BLD AUTO: 281 K/UL (ref 150–450)
PMV BLD AUTO: 10.2 FL (ref 9.2–12.9)
RBC # BLD AUTO: 4.16 M/UL (ref 4–5.4)
WBC # BLD AUTO: 4.87 K/UL (ref 3.9–12.7)

## 2021-04-23 DIAGNOSIS — E55.9 VITAMIN D INSUFFICIENCY: ICD-10-CM

## 2021-04-23 DIAGNOSIS — M81.0 AGE-RELATED OSTEOPOROSIS WITHOUT CURRENT PATHOLOGICAL FRACTURE: Primary | ICD-10-CM

## 2021-04-23 RX ORDER — ERGOCALCIFEROL 1.25 MG/1
50000 CAPSULE ORAL
Qty: 12 CAPSULE | Refills: 3 | Status: SHIPPED | OUTPATIENT
Start: 2021-04-23 | End: 2022-01-06 | Stop reason: SDUPTHER

## 2021-04-27 ENCOUNTER — HOSPITAL ENCOUNTER (OUTPATIENT)
Dept: RADIOLOGY | Facility: CLINIC | Age: 76
Discharge: HOME | End: 2021-04-27
Attending: INTERNAL MEDICINE
Payer: MEDICARE

## 2021-04-27 DIAGNOSIS — M81.0 AGE-RELATED OSTEOPOROSIS WITHOUT CURRENT PATHOLOGICAL FRACTURE: ICD-10-CM

## 2021-04-27 PROCEDURE — 77080 DXA BONE DENSITY AXIAL: CPT | Mod: TC,PO

## 2021-04-27 PROCEDURE — 77080 DXA BONE DENSITY AXIAL: CPT | Mod: 26,,, | Performed by: RADIOLOGY

## 2021-04-27 PROCEDURE — 77080 DEXA BONE DENSITY SPINE HIP: ICD-10-PCS | Mod: 26,,, | Performed by: RADIOLOGY

## 2021-04-28 ENCOUNTER — OFFICE VISIT (OUTPATIENT)
Dept: FAMILY MEDICINE | Facility: CLINIC | Age: 76
End: 2021-04-28
Attending: FAMILY MEDICINE
Payer: MEDICARE

## 2021-04-28 VITALS
BODY MASS INDEX: 25.44 KG/M2 | HEIGHT: 66 IN | DIASTOLIC BLOOD PRESSURE: 64 MMHG | HEART RATE: 72 BPM | SYSTOLIC BLOOD PRESSURE: 120 MMHG | WEIGHT: 158.31 LBS | TEMPERATURE: 99 F | OXYGEN SATURATION: 99 %

## 2021-04-28 DIAGNOSIS — E11.9 TYPE 2 DIABETES MELLITUS WITHOUT COMPLICATION, WITHOUT LONG-TERM CURRENT USE OF INSULIN: ICD-10-CM

## 2021-04-28 DIAGNOSIS — E04.2 MULTIPLE THYROID NODULES: ICD-10-CM

## 2021-04-28 DIAGNOSIS — E78.5 HYPERLIPIDEMIA ASSOCIATED WITH TYPE 2 DIABETES MELLITUS: ICD-10-CM

## 2021-04-28 DIAGNOSIS — E55.9 VITAMIN D DEFICIENCY: ICD-10-CM

## 2021-04-28 DIAGNOSIS — I10 HYPERTENSION, ESSENTIAL: ICD-10-CM

## 2021-04-28 DIAGNOSIS — E11.69 HYPERLIPIDEMIA ASSOCIATED WITH TYPE 2 DIABETES MELLITUS: ICD-10-CM

## 2021-04-28 DIAGNOSIS — R10.13 EPIGASTRIC DISCOMFORT: Primary | ICD-10-CM

## 2021-04-28 PROCEDURE — 3288F PR FALLS RISK ASSESSMENT DOCUMENTED: ICD-10-PCS | Mod: CPTII,S$GLB,, | Performed by: FAMILY MEDICINE

## 2021-04-28 PROCEDURE — 99999 PR PBB SHADOW E&M-EST. PATIENT-LVL IV: CPT | Mod: PBBFAC,,, | Performed by: FAMILY MEDICINE

## 2021-04-28 PROCEDURE — 3074F SYST BP LT 130 MM HG: CPT | Mod: CPTII,S$GLB,, | Performed by: FAMILY MEDICINE

## 2021-04-28 PROCEDURE — 1159F MED LIST DOCD IN RCRD: CPT | Mod: S$GLB,,, | Performed by: FAMILY MEDICINE

## 2021-04-28 PROCEDURE — 1126F PR PAIN SEVERITY QUANTIFIED, NO PAIN PRESENT: ICD-10-PCS | Mod: S$GLB,,, | Performed by: FAMILY MEDICINE

## 2021-04-28 PROCEDURE — 99213 OFFICE O/P EST LOW 20 MIN: CPT | Mod: S$GLB,,, | Performed by: FAMILY MEDICINE

## 2021-04-28 PROCEDURE — 3288F FALL RISK ASSESSMENT DOCD: CPT | Mod: CPTII,S$GLB,, | Performed by: FAMILY MEDICINE

## 2021-04-28 PROCEDURE — 1101F PT FALLS ASSESS-DOCD LE1/YR: CPT | Mod: CPTII,S$GLB,, | Performed by: FAMILY MEDICINE

## 2021-04-28 PROCEDURE — 1159F PR MEDICATION LIST DOCUMENTED IN MEDICAL RECORD: ICD-10-PCS | Mod: S$GLB,,, | Performed by: FAMILY MEDICINE

## 2021-04-28 PROCEDURE — 1126F AMNT PAIN NOTED NONE PRSNT: CPT | Mod: S$GLB,,, | Performed by: FAMILY MEDICINE

## 2021-04-28 PROCEDURE — 3074F PR MOST RECENT SYSTOLIC BLOOD PRESSURE < 130 MM HG: ICD-10-PCS | Mod: CPTII,S$GLB,, | Performed by: FAMILY MEDICINE

## 2021-04-28 PROCEDURE — 3078F DIAST BP <80 MM HG: CPT | Mod: CPTII,S$GLB,, | Performed by: FAMILY MEDICINE

## 2021-04-28 PROCEDURE — 1101F PR PT FALLS ASSESS DOC 0-1 FALLS W/OUT INJ PAST YR: ICD-10-PCS | Mod: CPTII,S$GLB,, | Performed by: FAMILY MEDICINE

## 2021-04-28 PROCEDURE — 3078F PR MOST RECENT DIASTOLIC BLOOD PRESSURE < 80 MM HG: ICD-10-PCS | Mod: CPTII,S$GLB,, | Performed by: FAMILY MEDICINE

## 2021-04-28 PROCEDURE — 99499 RISK ADDL DX/OHS AUDIT: ICD-10-PCS | Mod: S$GLB,,, | Performed by: FAMILY MEDICINE

## 2021-04-28 PROCEDURE — 99499 UNLISTED E&M SERVICE: CPT | Mod: S$GLB,,, | Performed by: FAMILY MEDICINE

## 2021-04-28 PROCEDURE — 99213 PR OFFICE/OUTPT VISIT, EST, LEVL III, 20-29 MIN: ICD-10-PCS | Mod: S$GLB,,, | Performed by: FAMILY MEDICINE

## 2021-04-28 PROCEDURE — 99999 PR PBB SHADOW E&M-EST. PATIENT-LVL IV: ICD-10-PCS | Mod: PBBFAC,,, | Performed by: FAMILY MEDICINE

## 2021-04-28 RX ORDER — LOSARTAN POTASSIUM 100 MG/1
100 TABLET ORAL DAILY
Qty: 90 TABLET | Refills: 3 | Status: SHIPPED | OUTPATIENT
Start: 2021-04-28 | End: 2022-05-25

## 2021-04-29 ENCOUNTER — HOSPITAL ENCOUNTER (OUTPATIENT)
Dept: RADIOLOGY | Facility: HOSPITAL | Age: 76
Discharge: HOME | End: 2021-04-29
Attending: INTERNAL MEDICINE
Payer: MEDICARE

## 2021-04-29 DIAGNOSIS — E04.2 MULTIPLE THYROID NODULES: ICD-10-CM

## 2021-04-29 PROCEDURE — 88173 PR  INTERPRETATION OF FNA SMEAR: ICD-10-PCS | Mod: 26,,, | Performed by: PATHOLOGY

## 2021-04-29 PROCEDURE — 10005 US FINE NEEDLE ASPIRATION THYROID, FIRST LESION: ICD-10-PCS | Mod: ,,, | Performed by: RADIOLOGY

## 2021-04-29 PROCEDURE — 10005 FNA BX W/US GDN 1ST LES: CPT | Mod: ,,, | Performed by: RADIOLOGY

## 2021-04-29 PROCEDURE — 10005 FNA BX W/US GDN 1ST LES: CPT

## 2021-04-29 PROCEDURE — 10006 PR FINE NEEDLE ASP BIOPSY, W/US GUIDANCE, EA ADDTL LESION: ICD-10-PCS | Mod: ,,, | Performed by: RADIOLOGY

## 2021-04-29 PROCEDURE — 10006 FNA BX W/US GDN EA ADDL: CPT

## 2021-04-29 PROCEDURE — 88173 CYTOPATH EVAL FNA REPORT: CPT | Mod: 26,,, | Performed by: PATHOLOGY

## 2021-04-29 PROCEDURE — 10006 FNA BX W/US GDN EA ADDL: CPT | Mod: ,,, | Performed by: RADIOLOGY

## 2021-04-29 PROCEDURE — 88173 CYTOPATH EVAL FNA REPORT: CPT | Mod: 59 | Performed by: PATHOLOGY

## 2021-05-04 LAB
COMMENT: ABNORMAL
FINAL PATHOLOGIC DIAGNOSIS: ABNORMAL
Lab: ABNORMAL

## 2021-05-05 ENCOUNTER — TELEPHONE (OUTPATIENT)
Dept: ENDOCRINOLOGY | Facility: CLINIC | Age: 76
End: 2021-05-05

## 2021-05-05 DIAGNOSIS — C73 THYROID CANCER: Primary | ICD-10-CM

## 2021-05-10 ENCOUNTER — OFFICE VISIT (OUTPATIENT)
Dept: SURGERY | Facility: CLINIC | Age: 76
End: 2021-05-10
Attending: SURGERY
Payer: MEDICARE

## 2021-05-10 VITALS
DIASTOLIC BLOOD PRESSURE: 76 MMHG | HEART RATE: 78 BPM | HEIGHT: 66 IN | SYSTOLIC BLOOD PRESSURE: 169 MMHG | WEIGHT: 158 LBS | TEMPERATURE: 99 F | BODY MASS INDEX: 25.39 KG/M2

## 2021-05-10 DIAGNOSIS — C73 THYROID CANCER: Primary | ICD-10-CM

## 2021-05-10 PROCEDURE — 1159F PR MEDICATION LIST DOCUMENTED IN MEDICAL RECORD: ICD-10-PCS | Mod: S$GLB,,, | Performed by: SURGERY

## 2021-05-10 PROCEDURE — 99214 PR OFFICE/OUTPT VISIT, EST, LEVL IV, 30-39 MIN: ICD-10-PCS | Mod: S$GLB,CS,, | Performed by: SURGERY

## 2021-05-10 PROCEDURE — 1126F PR PAIN SEVERITY QUANTIFIED, NO PAIN PRESENT: ICD-10-PCS | Mod: S$GLB,,, | Performed by: SURGERY

## 2021-05-10 PROCEDURE — 1159F MED LIST DOCD IN RCRD: CPT | Mod: S$GLB,,, | Performed by: SURGERY

## 2021-05-10 PROCEDURE — 1126F AMNT PAIN NOTED NONE PRSNT: CPT | Mod: S$GLB,,, | Performed by: SURGERY

## 2021-05-10 PROCEDURE — 99499 UNLISTED E&M SERVICE: CPT | Mod: S$GLB,,, | Performed by: SURGERY

## 2021-05-10 PROCEDURE — 99499 RISK ADDL DX/OHS AUDIT: ICD-10-PCS | Mod: S$GLB,,, | Performed by: SURGERY

## 2021-05-10 PROCEDURE — 99214 OFFICE O/P EST MOD 30 MIN: CPT | Mod: S$GLB,CS,, | Performed by: SURGERY

## 2021-05-13 ENCOUNTER — TELEPHONE (OUTPATIENT)
Dept: FAMILY MEDICINE | Facility: CLINIC | Age: 76
End: 2021-05-13

## 2021-05-13 RX ORDER — SODIUM CHLORIDE 9 MG/ML
INJECTION, SOLUTION INTRAVENOUS CONTINUOUS
Status: CANCELLED | OUTPATIENT
Start: 2021-05-13

## 2021-05-14 ENCOUNTER — OFFICE VISIT (OUTPATIENT)
Dept: FAMILY MEDICINE | Facility: CLINIC | Age: 76
End: 2021-05-14
Payer: MEDICARE

## 2021-05-14 ENCOUNTER — HOSPITAL ENCOUNTER (OUTPATIENT)
Dept: RADIOLOGY | Facility: CLINIC | Age: 76
Discharge: HOME | End: 2021-05-14
Attending: PHYSICIAN ASSISTANT
Payer: MEDICARE

## 2021-05-14 VITALS
OXYGEN SATURATION: 97 % | TEMPERATURE: 98 F | WEIGHT: 160.5 LBS | HEIGHT: 66 IN | SYSTOLIC BLOOD PRESSURE: 128 MMHG | BODY MASS INDEX: 25.79 KG/M2 | HEART RATE: 88 BPM | DIASTOLIC BLOOD PRESSURE: 68 MMHG

## 2021-05-14 DIAGNOSIS — Z01.818 PRE-OP EVALUATION: Primary | ICD-10-CM

## 2021-05-14 DIAGNOSIS — Z01.818 PRE-OP EVALUATION: ICD-10-CM

## 2021-05-14 DIAGNOSIS — R59.9 ENLARGED LYMPH NODES, UNSPECIFIED: ICD-10-CM

## 2021-05-14 DIAGNOSIS — N63.0 BREAST MASS IN FEMALE: ICD-10-CM

## 2021-05-14 PROCEDURE — 1101F PT FALLS ASSESS-DOCD LE1/YR: CPT | Mod: CPTII,S$GLB,, | Performed by: PHYSICIAN ASSISTANT

## 2021-05-14 PROCEDURE — 71046 X-RAY EXAM CHEST 2 VIEWS: CPT | Mod: 26,,, | Performed by: RADIOLOGY

## 2021-05-14 PROCEDURE — 71046 X-RAY EXAM CHEST 2 VIEWS: CPT | Mod: TC,FY,PO

## 2021-05-14 PROCEDURE — 1101F PR PT FALLS ASSESS DOC 0-1 FALLS W/OUT INJ PAST YR: ICD-10-PCS | Mod: CPTII,S$GLB,, | Performed by: PHYSICIAN ASSISTANT

## 2021-05-14 PROCEDURE — 3288F FALL RISK ASSESSMENT DOCD: CPT | Mod: CPTII,S$GLB,, | Performed by: PHYSICIAN ASSISTANT

## 2021-05-14 PROCEDURE — 1159F PR MEDICATION LIST DOCUMENTED IN MEDICAL RECORD: ICD-10-PCS | Mod: S$GLB,,, | Performed by: PHYSICIAN ASSISTANT

## 2021-05-14 PROCEDURE — 99999 PR PBB SHADOW E&M-EST. PATIENT-LVL V: ICD-10-PCS | Mod: PBBFAC,,, | Performed by: PHYSICIAN ASSISTANT

## 2021-05-14 PROCEDURE — 99214 PR OFFICE/OUTPT VISIT, EST, LEVL IV, 30-39 MIN: ICD-10-PCS | Mod: S$GLB,,, | Performed by: PHYSICIAN ASSISTANT

## 2021-05-14 PROCEDURE — 99214 OFFICE O/P EST MOD 30 MIN: CPT | Mod: S$GLB,,, | Performed by: PHYSICIAN ASSISTANT

## 2021-05-14 PROCEDURE — 1159F MED LIST DOCD IN RCRD: CPT | Mod: S$GLB,,, | Performed by: PHYSICIAN ASSISTANT

## 2021-05-14 PROCEDURE — 1126F AMNT PAIN NOTED NONE PRSNT: CPT | Mod: S$GLB,,, | Performed by: PHYSICIAN ASSISTANT

## 2021-05-14 PROCEDURE — 71046 XR CHEST PA AND LATERAL: ICD-10-PCS | Mod: 26,,, | Performed by: RADIOLOGY

## 2021-05-14 PROCEDURE — 1126F PR PAIN SEVERITY QUANTIFIED, NO PAIN PRESENT: ICD-10-PCS | Mod: S$GLB,,, | Performed by: PHYSICIAN ASSISTANT

## 2021-05-14 PROCEDURE — 99999 PR PBB SHADOW E&M-EST. PATIENT-LVL V: CPT | Mod: PBBFAC,,, | Performed by: PHYSICIAN ASSISTANT

## 2021-05-14 PROCEDURE — 3288F PR FALLS RISK ASSESSMENT DOCUMENTED: ICD-10-PCS | Mod: CPTII,S$GLB,, | Performed by: PHYSICIAN ASSISTANT

## 2021-05-17 ENCOUNTER — LAB VISIT (OUTPATIENT)
Dept: PRIMARY CARE CLINIC | Facility: CLINIC | Age: 76
End: 2021-05-17
Payer: MEDICARE

## 2021-05-17 DIAGNOSIS — C73 THYROID CANCER: ICD-10-CM

## 2021-05-17 PROCEDURE — U0003 INFECTIOUS AGENT DETECTION BY NUCLEIC ACID (DNA OR RNA); SEVERE ACUTE RESPIRATORY SYNDROME CORONAVIRUS 2 (SARS-COV-2) (CORONAVIRUS DISEASE [COVID-19]), AMPLIFIED PROBE TECHNIQUE, MAKING USE OF HIGH THROUGHPUT TECHNOLOGIES AS DESCRIBED BY CMS-2020-01-R: HCPCS | Performed by: SURGERY

## 2021-05-17 PROCEDURE — U0005 INFEC AGEN DETEC AMPLI PROBE: HCPCS | Performed by: SURGERY

## 2021-05-18 ENCOUNTER — HOSPITAL ENCOUNTER (OUTPATIENT)
Dept: CARDIOLOGY | Facility: HOSPITAL | Age: 76
Discharge: HOME | End: 2021-05-18
Attending: PHYSICIAN ASSISTANT
Payer: MEDICARE

## 2021-05-18 ENCOUNTER — ANESTHESIA EVENT (OUTPATIENT)
Dept: SURGERY | Facility: HOSPITAL | Age: 76
End: 2021-05-18
Payer: MEDICARE

## 2021-05-18 ENCOUNTER — TELEPHONE (OUTPATIENT)
Dept: SURGERY | Facility: CLINIC | Age: 76
End: 2021-05-18

## 2021-05-18 DIAGNOSIS — Z01.818 PRE-OP EVALUATION: ICD-10-CM

## 2021-05-18 LAB — SARS-COV-2 RNA RESP QL NAA+PROBE: NOT DETECTED

## 2021-05-18 RX ORDER — CHLORHEXIDINE GLUCONATE ORAL RINSE 1.2 MG/ML
SOLUTION DENTAL
COMMUNITY
Start: 2021-02-22 | End: 2021-12-28 | Stop reason: CLARIF

## 2021-05-19 ENCOUNTER — HOSPITAL ENCOUNTER (OUTPATIENT)
Facility: HOSPITAL | Age: 76
Discharge: HOME | End: 2021-05-20
Attending: SURGERY | Admitting: SURGERY
Payer: MEDICARE

## 2021-05-19 ENCOUNTER — ANESTHESIA (OUTPATIENT)
Dept: SURGERY | Facility: HOSPITAL | Age: 76
End: 2021-05-19
Payer: MEDICARE

## 2021-05-19 DIAGNOSIS — C73 THYROID CANCER: Primary | ICD-10-CM

## 2021-05-19 LAB
POCT GLUCOSE: 109 MG/DL (ref 70–110)
POCT GLUCOSE: 135 MG/DL (ref 70–110)
PTH-INTACT SERPL-MCNC: <5 PG/ML (ref 9–77)

## 2021-05-19 PROCEDURE — 71000016 HC POSTOP RECOV ADDL HR: Performed by: SURGERY

## 2021-05-19 PROCEDURE — 36000706: Performed by: SURGERY

## 2021-05-19 PROCEDURE — 60240 PR THYROIDECTOMY: ICD-10-PCS | Mod: ,,, | Performed by: SURGERY

## 2021-05-19 PROCEDURE — 88307 TISSUE EXAM BY PATHOLOGIST: CPT | Mod: 26,,, | Performed by: PATHOLOGY

## 2021-05-19 PROCEDURE — 25000003 PHARM REV CODE 250: Performed by: NURSE ANESTHETIST, CERTIFIED REGISTERED

## 2021-05-19 PROCEDURE — 25000003 PHARM REV CODE 250: Performed by: STUDENT IN AN ORGANIZED HEALTH CARE EDUCATION/TRAINING PROGRAM

## 2021-05-19 PROCEDURE — 25000003 PHARM REV CODE 250: Performed by: ANESTHESIOLOGY

## 2021-05-19 PROCEDURE — 88307 PR  SURG PATH,LEVEL V: ICD-10-PCS | Mod: 26,,, | Performed by: PATHOLOGY

## 2021-05-19 PROCEDURE — 63600175 PHARM REV CODE 636 W HCPCS: Performed by: SURGERY

## 2021-05-19 PROCEDURE — 27201423 OPTIME MED/SURG SUP & DEVICES STERILE SUPPLY: Performed by: SURGERY

## 2021-05-19 PROCEDURE — 60240 REMOVAL OF THYROID: CPT | Mod: ,,, | Performed by: SURGERY

## 2021-05-19 PROCEDURE — 94761 N-INVAS EAR/PLS OXIMETRY MLT: CPT

## 2021-05-19 PROCEDURE — D9220A PRA ANESTHESIA: ICD-10-PCS | Mod: CRNA,,, | Performed by: NURSE ANESTHETIST, CERTIFIED REGISTERED

## 2021-05-19 PROCEDURE — 37000009 HC ANESTHESIA EA ADD 15 MINS: Performed by: SURGERY

## 2021-05-19 PROCEDURE — 63600175 PHARM REV CODE 636 W HCPCS

## 2021-05-19 PROCEDURE — 71000015 HC POSTOP RECOV 1ST HR: Performed by: SURGERY

## 2021-05-19 PROCEDURE — D9220A PRA ANESTHESIA: ICD-10-PCS | Mod: ANES,,, | Performed by: STUDENT IN AN ORGANIZED HEALTH CARE EDUCATION/TRAINING PROGRAM

## 2021-05-19 PROCEDURE — 63600175 PHARM REV CODE 636 W HCPCS: Performed by: STUDENT IN AN ORGANIZED HEALTH CARE EDUCATION/TRAINING PROGRAM

## 2021-05-19 PROCEDURE — 36415 COLL VENOUS BLD VENIPUNCTURE: CPT | Performed by: STUDENT IN AN ORGANIZED HEALTH CARE EDUCATION/TRAINING PROGRAM

## 2021-05-19 PROCEDURE — D9220A PRA ANESTHESIA: Mod: ANES,,, | Performed by: STUDENT IN AN ORGANIZED HEALTH CARE EDUCATION/TRAINING PROGRAM

## 2021-05-19 PROCEDURE — 71000033 HC RECOVERY, INTIAL HOUR: Performed by: SURGERY

## 2021-05-19 PROCEDURE — 63600175 PHARM REV CODE 636 W HCPCS: Performed by: NURSE ANESTHETIST, CERTIFIED REGISTERED

## 2021-05-19 PROCEDURE — 25000003 PHARM REV CODE 250: Performed by: SURGERY

## 2021-05-19 PROCEDURE — 37000008 HC ANESTHESIA 1ST 15 MINUTES: Performed by: SURGERY

## 2021-05-19 PROCEDURE — 83970 ASSAY OF PARATHORMONE: CPT | Performed by: STUDENT IN AN ORGANIZED HEALTH CARE EDUCATION/TRAINING PROGRAM

## 2021-05-19 PROCEDURE — D9220A PRA ANESTHESIA: Mod: CRNA,,, | Performed by: NURSE ANESTHETIST, CERTIFIED REGISTERED

## 2021-05-19 PROCEDURE — 36000707: Performed by: SURGERY

## 2021-05-19 PROCEDURE — 88307 TISSUE EXAM BY PATHOLOGIST: CPT | Performed by: PATHOLOGY

## 2021-05-19 PROCEDURE — 82962 GLUCOSE BLOOD TEST: CPT | Performed by: SURGERY

## 2021-05-19 RX ORDER — LIDOCAINE HYDROCHLORIDE 20 MG/ML
INJECTION, SOLUTION EPIDURAL; INFILTRATION; INTRACAUDAL; PERINEURAL
Status: DISCONTINUED | OUTPATIENT
Start: 2021-05-19 | End: 2021-05-19

## 2021-05-19 RX ORDER — GABAPENTIN 300 MG/1
300 CAPSULE ORAL 3 TIMES DAILY
Status: DISCONTINUED | OUTPATIENT
Start: 2021-05-19 | End: 2021-05-20 | Stop reason: HOSPADM

## 2021-05-19 RX ORDER — ONDANSETRON 2 MG/ML
INJECTION INTRAMUSCULAR; INTRAVENOUS
Status: DISCONTINUED | OUTPATIENT
Start: 2021-05-19 | End: 2021-05-19

## 2021-05-19 RX ORDER — SODIUM CHLORIDE 0.9 % (FLUSH) 0.9 %
10 SYRINGE (ML) INJECTION
Status: DISCONTINUED | OUTPATIENT
Start: 2021-05-19 | End: 2021-05-20 | Stop reason: HOSPADM

## 2021-05-19 RX ORDER — FENTANYL CITRATE 50 UG/ML
INJECTION, SOLUTION INTRAMUSCULAR; INTRAVENOUS
Status: COMPLETED
Start: 2021-05-19 | End: 2021-05-19

## 2021-05-19 RX ORDER — CYANOCOBALAMIN (VITAMIN B-12) 250 MCG
250 TABLET ORAL DAILY
Status: DISCONTINUED | OUTPATIENT
Start: 2021-05-20 | End: 2021-05-20 | Stop reason: HOSPADM

## 2021-05-19 RX ORDER — ONDANSETRON 2 MG/ML
4 INJECTION INTRAMUSCULAR; INTRAVENOUS DAILY PRN
Status: DISCONTINUED | OUTPATIENT
Start: 2021-05-19 | End: 2021-05-19 | Stop reason: HOSPADM

## 2021-05-19 RX ORDER — SCOLOPAMINE TRANSDERMAL SYSTEM 1 MG/1
1 PATCH, EXTENDED RELEASE TRANSDERMAL ONCE AS NEEDED
Status: DISCONTINUED | OUTPATIENT
Start: 2021-05-20 | End: 2021-05-20 | Stop reason: HOSPADM

## 2021-05-19 RX ORDER — CALCITRIOL 0.25 UG/1
0.25 CAPSULE ORAL 2 TIMES DAILY
Status: CANCELLED | OUTPATIENT
Start: 2021-05-19

## 2021-05-19 RX ORDER — SODIUM CHLORIDE 9 MG/ML
INJECTION, SOLUTION INTRAVENOUS CONTINUOUS
Status: DISCONTINUED | OUTPATIENT
Start: 2021-05-19 | End: 2021-05-19

## 2021-05-19 RX ORDER — ACETAMINOPHEN 325 MG/1
650 TABLET ORAL EVERY 8 HOURS PRN
Status: DISCONTINUED | OUTPATIENT
Start: 2021-05-19 | End: 2021-05-20 | Stop reason: HOSPADM

## 2021-05-19 RX ORDER — PHENYLEPHRINE HCL IN 0.9% NACL 1 MG/10 ML
SYRINGE (ML) INTRAVENOUS
Status: DISCONTINUED | OUTPATIENT
Start: 2021-05-19 | End: 2021-05-19

## 2021-05-19 RX ORDER — OXYCODONE HYDROCHLORIDE 5 MG/1
5 TABLET ORAL EVERY 4 HOURS PRN
Status: DISCONTINUED | OUTPATIENT
Start: 2021-05-19 | End: 2021-05-20 | Stop reason: HOSPADM

## 2021-05-19 RX ORDER — SUCCINYLCHOLINE CHLORIDE 20 MG/ML
INJECTION INTRAMUSCULAR; INTRAVENOUS
Status: DISCONTINUED | OUTPATIENT
Start: 2021-05-19 | End: 2021-05-19

## 2021-05-19 RX ORDER — ROSUVASTATIN CALCIUM 20 MG/1
20 TABLET, COATED ORAL NIGHTLY
Status: DISCONTINUED | OUTPATIENT
Start: 2021-05-19 | End: 2021-05-20 | Stop reason: HOSPADM

## 2021-05-19 RX ORDER — CALCIUM CARBONATE 200(500)MG
1500 TABLET,CHEWABLE ORAL 3 TIMES DAILY
Status: CANCELLED | OUTPATIENT
Start: 2021-05-19

## 2021-05-19 RX ORDER — FENTANYL CITRATE 50 UG/ML
25 INJECTION, SOLUTION INTRAMUSCULAR; INTRAVENOUS EVERY 5 MIN PRN
Status: DISCONTINUED | OUTPATIENT
Start: 2021-05-19 | End: 2021-05-19 | Stop reason: HOSPADM

## 2021-05-19 RX ORDER — DOXEPIN HYDROCHLORIDE 10 MG/1
10 CAPSULE ORAL NIGHTLY
Status: DISCONTINUED | OUTPATIENT
Start: 2021-05-19 | End: 2021-05-20 | Stop reason: HOSPADM

## 2021-05-19 RX ORDER — IBUPROFEN 200 MG
16 TABLET ORAL
Status: DISCONTINUED | OUTPATIENT
Start: 2021-05-19 | End: 2021-05-20 | Stop reason: HOSPADM

## 2021-05-19 RX ORDER — PROPOFOL 10 MG/ML
VIAL (ML) INTRAVENOUS
Status: DISCONTINUED | OUTPATIENT
Start: 2021-05-19 | End: 2021-05-19

## 2021-05-19 RX ORDER — LABETALOL HCL 20 MG/4 ML
10 SYRINGE (ML) INTRAVENOUS EVERY 6 HOURS PRN
Status: DISCONTINUED | OUTPATIENT
Start: 2021-05-19 | End: 2021-05-20 | Stop reason: HOSPADM

## 2021-05-19 RX ORDER — FENTANYL CITRATE 50 UG/ML
INJECTION, SOLUTION INTRAMUSCULAR; INTRAVENOUS
Status: DISCONTINUED | OUTPATIENT
Start: 2021-05-19 | End: 2021-05-19

## 2021-05-19 RX ORDER — BUPIVACAINE HYDROCHLORIDE 5 MG/ML
INJECTION, SOLUTION EPIDURAL; INTRACAUDAL
Status: DISCONTINUED | OUTPATIENT
Start: 2021-05-19 | End: 2021-05-19 | Stop reason: HOSPADM

## 2021-05-19 RX ORDER — PNV NO.95/FERROUS FUM/FOLIC AC 28MG-0.8MG
100 TABLET ORAL DAILY
Status: DISCONTINUED | OUTPATIENT
Start: 2021-05-20 | End: 2021-05-19

## 2021-05-19 RX ORDER — LEVOTHYROXINE SODIUM 100 UG/1
100 TABLET ORAL
Status: DISCONTINUED | OUTPATIENT
Start: 2021-05-20 | End: 2021-05-20 | Stop reason: HOSPADM

## 2021-05-19 RX ORDER — TALC
6 POWDER (GRAM) TOPICAL NIGHTLY PRN
Status: DISCONTINUED | OUTPATIENT
Start: 2021-05-19 | End: 2021-05-20 | Stop reason: HOSPADM

## 2021-05-19 RX ORDER — ACETAMINOPHEN 10 MG/ML
INJECTION, SOLUTION INTRAVENOUS
Status: DISCONTINUED | OUTPATIENT
Start: 2021-05-19 | End: 2021-05-19

## 2021-05-19 RX ORDER — KETAMINE HCL IN 0.9 % NACL 50 MG/5 ML
SYRINGE (ML) INTRAVENOUS
Status: DISCONTINUED | OUTPATIENT
Start: 2021-05-19 | End: 2021-05-19

## 2021-05-19 RX ORDER — AMLODIPINE BESYLATE 2.5 MG/1
2.5 TABLET ORAL DAILY
Status: DISCONTINUED | OUTPATIENT
Start: 2021-05-20 | End: 2021-05-20 | Stop reason: HOSPADM

## 2021-05-19 RX ORDER — CEFAZOLIN SODIUM 1 G/3ML
2 INJECTION, POWDER, FOR SOLUTION INTRAMUSCULAR; INTRAVENOUS
Status: COMPLETED | OUTPATIENT
Start: 2021-05-19 | End: 2021-05-19

## 2021-05-19 RX ORDER — GLUCAGON 1 MG
1 KIT INJECTION
Status: DISCONTINUED | OUTPATIENT
Start: 2021-05-19 | End: 2021-05-20 | Stop reason: HOSPADM

## 2021-05-19 RX ORDER — ONDANSETRON 8 MG/1
8 TABLET, ORALLY DISINTEGRATING ORAL EVERY 8 HOURS PRN
Status: DISCONTINUED | OUTPATIENT
Start: 2021-05-19 | End: 2021-05-20 | Stop reason: HOSPADM

## 2021-05-19 RX ORDER — IBUPROFEN 200 MG
24 TABLET ORAL
Status: DISCONTINUED | OUTPATIENT
Start: 2021-05-19 | End: 2021-05-20 | Stop reason: HOSPADM

## 2021-05-19 RX ORDER — ACETAMINOPHEN 325 MG/1
650 TABLET ORAL EVERY 6 HOURS
Status: DISCONTINUED | OUTPATIENT
Start: 2021-05-19 | End: 2021-05-20 | Stop reason: HOSPADM

## 2021-05-19 RX ORDER — ERGOCALCIFEROL 1.25 MG/1
50000 CAPSULE ORAL DAILY
Status: DISCONTINUED | OUTPATIENT
Start: 2021-05-19 | End: 2021-05-20 | Stop reason: HOSPADM

## 2021-05-19 RX ORDER — HYDROMORPHONE HYDROCHLORIDE 1 MG/ML
0.5 INJECTION, SOLUTION INTRAMUSCULAR; INTRAVENOUS; SUBCUTANEOUS EVERY 4 HOURS PRN
Status: DISCONTINUED | OUTPATIENT
Start: 2021-05-19 | End: 2021-05-20 | Stop reason: HOSPADM

## 2021-05-19 RX ORDER — DEXMEDETOMIDINE HYDROCHLORIDE 100 UG/ML
INJECTION, SOLUTION INTRAVENOUS
Status: DISCONTINUED | OUTPATIENT
Start: 2021-05-19 | End: 2021-05-19

## 2021-05-19 RX ORDER — LABETALOL HCL 20 MG/4 ML
10 SYRINGE (ML) INTRAVENOUS EVERY 6 HOURS PRN
Status: DISCONTINUED | OUTPATIENT
Start: 2021-05-19 | End: 2021-05-19

## 2021-05-19 RX ORDER — DEXAMETHASONE SODIUM PHOSPHATE 4 MG/ML
4 INJECTION, SOLUTION INTRA-ARTICULAR; INTRALESIONAL; INTRAMUSCULAR; INTRAVENOUS; SOFT TISSUE EVERY 6 HOURS
Status: DISCONTINUED | OUTPATIENT
Start: 2021-05-20 | End: 2021-05-20 | Stop reason: HOSPADM

## 2021-05-19 RX ORDER — INSULIN ASPART 100 [IU]/ML
0-5 INJECTION, SOLUTION INTRAVENOUS; SUBCUTANEOUS
Status: DISCONTINUED | OUTPATIENT
Start: 2021-05-19 | End: 2021-05-20 | Stop reason: HOSPADM

## 2021-05-19 RX ORDER — DEXAMETHASONE SODIUM PHOSPHATE 4 MG/ML
INJECTION, SOLUTION INTRA-ARTICULAR; INTRALESIONAL; INTRAMUSCULAR; INTRAVENOUS; SOFT TISSUE
Status: DISCONTINUED | OUTPATIENT
Start: 2021-05-19 | End: 2021-05-19

## 2021-05-19 RX ADMIN — DEXMEDETOMIDINE HYDROCHLORIDE 8 MCG: 100 INJECTION, SOLUTION, CONCENTRATE INTRAVENOUS at 03:05

## 2021-05-19 RX ADMIN — SUCCINYLCHOLINE CHLORIDE 100 MG: 20 INJECTION, SOLUTION INTRAMUSCULAR; INTRAVENOUS at 10:05

## 2021-05-19 RX ADMIN — FENTANYL CITRATE 25 MCG: 50 INJECTION, SOLUTION INTRAMUSCULAR; INTRAVENOUS at 02:05

## 2021-05-19 RX ADMIN — OXYCODONE 5 MG: 5 TABLET ORAL at 04:05

## 2021-05-19 RX ADMIN — DOXEPIN HYDROCHLORIDE 10 MG: 10 CAPSULE ORAL at 11:05

## 2021-05-19 RX ADMIN — GABAPENTIN 300 MG: 300 CAPSULE ORAL at 11:05

## 2021-05-19 RX ADMIN — ACETAMINOPHEN 1000 MG: 10 INJECTION, SOLUTION INTRAVENOUS at 02:05

## 2021-05-19 RX ADMIN — Medication 50 MCG: at 12:05

## 2021-05-19 RX ADMIN — FENTANYL CITRATE 25 MCG: 50 INJECTION INTRAMUSCULAR; INTRAVENOUS at 04:05

## 2021-05-19 RX ADMIN — FENTANYL CITRATE 100 MCG: 50 INJECTION, SOLUTION INTRAMUSCULAR; INTRAVENOUS at 10:05

## 2021-05-19 RX ADMIN — ONDANSETRON 4 MG: 2 INJECTION INTRAMUSCULAR; INTRAVENOUS at 10:05

## 2021-05-19 RX ADMIN — FENTANYL CITRATE 25 MCG: 50 INJECTION, SOLUTION INTRAMUSCULAR; INTRAVENOUS at 03:05

## 2021-05-19 RX ADMIN — Medication 20 MG: at 10:05

## 2021-05-19 RX ADMIN — Medication 30 MG: at 11:05

## 2021-05-19 RX ADMIN — FENTANYL CITRATE 50 MCG: 50 INJECTION, SOLUTION INTRAMUSCULAR; INTRAVENOUS at 11:05

## 2021-05-19 RX ADMIN — Medication 50 MCG: at 03:05

## 2021-05-19 RX ADMIN — PROPOFOL 120 MG: 10 INJECTION, EMULSION INTRAVENOUS at 10:05

## 2021-05-19 RX ADMIN — LIDOCAINE HYDROCHLORIDE 100 MG: 20 INJECTION, SOLUTION EPIDURAL; INFILTRATION; INTRACAUDAL at 10:05

## 2021-05-19 RX ADMIN — CEFAZOLIN 2 G: 330 INJECTION, POWDER, FOR SOLUTION INTRAMUSCULAR; INTRAVENOUS at 02:05

## 2021-05-19 RX ADMIN — CEFAZOLIN 2 G: 330 INJECTION, POWDER, FOR SOLUTION INTRAMUSCULAR; INTRAVENOUS at 10:05

## 2021-05-19 RX ADMIN — FENTANYL CITRATE 25 MCG: 50 INJECTION, SOLUTION INTRAMUSCULAR; INTRAVENOUS at 04:05

## 2021-05-19 RX ADMIN — DEXAMETHASONE SODIUM PHOSPHATE 8 MG: 4 INJECTION INTRA-ARTICULAR; INTRALESIONAL; INTRAMUSCULAR; INTRAVENOUS; SOFT TISSUE at 10:05

## 2021-05-19 RX ADMIN — ROSUVASTATIN CALCIUM 20 MG: 20 TABLET, FILM COATED ORAL at 11:05

## 2021-05-20 VITALS
SYSTOLIC BLOOD PRESSURE: 127 MMHG | TEMPERATURE: 98 F | RESPIRATION RATE: 18 BRPM | DIASTOLIC BLOOD PRESSURE: 62 MMHG | OXYGEN SATURATION: 98 % | BODY MASS INDEX: 25.79 KG/M2 | HEIGHT: 66 IN | HEART RATE: 68 BPM | WEIGHT: 160.5 LBS

## 2021-05-20 LAB
ALBUMIN SERPL BCP-MCNC: 3.8 G/DL (ref 3.5–5.2)
ANION GAP SERPL CALC-SCNC: 13 MMOL/L (ref 8–16)
BUN SERPL-MCNC: 15 MG/DL (ref 8–23)
CALCIUM SERPL-MCNC: 8.6 MG/DL (ref 8.7–10.5)
CHLORIDE SERPL-SCNC: 107 MMOL/L (ref 95–110)
CO2 SERPL-SCNC: 20 MMOL/L (ref 23–29)
CREAT SERPL-MCNC: 1 MG/DL (ref 0.5–1.4)
EST. GFR  (AFRICAN AMERICAN): >60 ML/MIN/1.73 M^2
EST. GFR  (NON AFRICAN AMERICAN): 54.5 ML/MIN/1.73 M^2
GLUCOSE SERPL-MCNC: 152 MG/DL (ref 70–110)
PHOSPHATE SERPL-MCNC: 3.7 MG/DL (ref 2.7–4.5)
POCT GLUCOSE: 146 MG/DL (ref 70–110)
POTASSIUM SERPL-SCNC: 4.2 MMOL/L (ref 3.5–5.1)
PTH-INTACT SERPL-MCNC: 10 PG/ML (ref 9–77)
SODIUM SERPL-SCNC: 140 MMOL/L (ref 136–145)

## 2021-05-20 PROCEDURE — 25000003 PHARM REV CODE 250: Performed by: STUDENT IN AN ORGANIZED HEALTH CARE EDUCATION/TRAINING PROGRAM

## 2021-05-20 PROCEDURE — 36415 COLL VENOUS BLD VENIPUNCTURE: CPT | Performed by: STUDENT IN AN ORGANIZED HEALTH CARE EDUCATION/TRAINING PROGRAM

## 2021-05-20 PROCEDURE — 25000003 PHARM REV CODE 250: Performed by: SURGERY

## 2021-05-20 PROCEDURE — 63600175 PHARM REV CODE 636 W HCPCS: Performed by: SURGERY

## 2021-05-20 PROCEDURE — 83970 ASSAY OF PARATHORMONE: CPT | Performed by: STUDENT IN AN ORGANIZED HEALTH CARE EDUCATION/TRAINING PROGRAM

## 2021-05-20 PROCEDURE — 80069 RENAL FUNCTION PANEL: CPT | Performed by: STUDENT IN AN ORGANIZED HEALTH CARE EDUCATION/TRAINING PROGRAM

## 2021-05-20 PROCEDURE — 63600175 PHARM REV CODE 636 W HCPCS: Performed by: STUDENT IN AN ORGANIZED HEALTH CARE EDUCATION/TRAINING PROGRAM

## 2021-05-20 RX ORDER — OXYCODONE HYDROCHLORIDE 5 MG/1
5 TABLET ORAL EVERY 6 HOURS PRN
Qty: 5 TABLET | Refills: 0 | Status: SHIPPED | OUTPATIENT
Start: 2021-05-20 | End: 2021-08-11

## 2021-05-20 RX ORDER — CALCIUM CARBONATE 200(500)MG
1000 TABLET,CHEWABLE ORAL 2 TIMES DAILY
Status: DISCONTINUED | OUTPATIENT
Start: 2021-05-20 | End: 2021-05-20 | Stop reason: HOSPADM

## 2021-05-20 RX ORDER — CALCITRIOL 0.25 UG/1
0.25 CAPSULE ORAL 2 TIMES DAILY
Qty: 60 CAPSULE | Refills: 0 | Status: SHIPPED | OUTPATIENT
Start: 2021-05-20 | End: 2021-06-19

## 2021-05-20 RX ORDER — CALCIUM CARBONATE 200(500)MG
1000 TABLET,CHEWABLE ORAL 2 TIMES DAILY
Qty: 120 TABLET | Refills: 11 | Status: SHIPPED | OUTPATIENT
Start: 2021-05-20 | End: 2021-08-25 | Stop reason: CLARIF

## 2021-05-20 RX ADMIN — ACETAMINOPHEN 650 MG: 325 TABLET ORAL at 12:05

## 2021-05-20 RX ADMIN — DEXAMETHASONE SODIUM PHOSPHATE 4 MG: 4 INJECTION INTRA-ARTICULAR; INTRALESIONAL; INTRAMUSCULAR; INTRAVENOUS; SOFT TISSUE at 06:05

## 2021-05-20 RX ADMIN — ERGOCALCIFEROL 50000 UNITS: 1.25 CAPSULE ORAL at 08:05

## 2021-05-20 RX ADMIN — METHOCARBAMOL 500 MG: 100 INJECTION INTRAMUSCULAR; INTRAVENOUS at 02:05

## 2021-05-20 RX ADMIN — DEXAMETHASONE SODIUM PHOSPHATE 4 MG: 4 INJECTION INTRA-ARTICULAR; INTRALESIONAL; INTRAMUSCULAR; INTRAVENOUS; SOFT TISSUE at 12:05

## 2021-05-20 RX ADMIN — METHOCARBAMOL 500 MG: 100 INJECTION INTRAMUSCULAR; INTRAVENOUS at 06:05

## 2021-05-20 RX ADMIN — ACETAMINOPHEN 650 MG: 325 TABLET ORAL at 06:05

## 2021-05-20 RX ADMIN — Medication 250 MCG: at 08:05

## 2021-05-20 RX ADMIN — CALCIUM CARBONATE (ANTACID) CHEW TAB 500 MG 1000 MG: 500 CHEW TAB at 08:05

## 2021-05-20 RX ADMIN — LEVOTHYROXINE SODIUM 100 MCG: 100 TABLET ORAL at 06:05

## 2021-05-20 RX ADMIN — GABAPENTIN 300 MG: 300 CAPSULE ORAL at 08:05

## 2021-05-20 RX ADMIN — AMLODIPINE BESYLATE 2.5 MG: 2.5 TABLET ORAL at 08:05

## 2021-05-24 ENCOUNTER — TELEPHONE (OUTPATIENT)
Dept: SURGERY | Facility: CLINIC | Age: 76
End: 2021-05-24

## 2021-05-27 LAB
FINAL PATHOLOGIC DIAGNOSIS: NORMAL
GROSS: NORMAL
Lab: NORMAL

## 2021-05-28 ENCOUNTER — OFFICE VISIT (OUTPATIENT)
Dept: UROLOGY | Facility: CLINIC | Age: 76
End: 2021-05-28
Payer: MEDICARE

## 2021-05-28 VITALS
SYSTOLIC BLOOD PRESSURE: 133 MMHG | BODY MASS INDEX: 25.79 KG/M2 | HEART RATE: 89 BPM | DIASTOLIC BLOOD PRESSURE: 77 MMHG | WEIGHT: 160.5 LBS | HEIGHT: 66 IN

## 2021-05-28 DIAGNOSIS — Z87.448 HISTORY OF HYDRONEPHROSIS: ICD-10-CM

## 2021-05-28 DIAGNOSIS — R31.29 MICROSCOPIC HEMATURIA: Primary | ICD-10-CM

## 2021-05-28 PROCEDURE — 3288F PR FALLS RISK ASSESSMENT DOCUMENTED: ICD-10-PCS | Mod: CPTII,S$GLB,, | Performed by: UROLOGY

## 2021-05-28 PROCEDURE — 1101F PT FALLS ASSESS-DOCD LE1/YR: CPT | Mod: CPTII,S$GLB,, | Performed by: UROLOGY

## 2021-05-28 PROCEDURE — 99999 PR PBB SHADOW E&M-EST. PATIENT-LVL IV: ICD-10-PCS | Mod: PBBFAC,,, | Performed by: UROLOGY

## 2021-05-28 PROCEDURE — 99999 PR PBB SHADOW E&M-EST. PATIENT-LVL IV: CPT | Mod: PBBFAC,,, | Performed by: UROLOGY

## 2021-05-28 PROCEDURE — 99214 OFFICE O/P EST MOD 30 MIN: CPT | Mod: S$GLB,,, | Performed by: UROLOGY

## 2021-05-28 PROCEDURE — 1159F MED LIST DOCD IN RCRD: CPT | Mod: S$GLB,,, | Performed by: UROLOGY

## 2021-05-28 PROCEDURE — 1101F PR PT FALLS ASSESS DOC 0-1 FALLS W/OUT INJ PAST YR: ICD-10-PCS | Mod: CPTII,S$GLB,, | Performed by: UROLOGY

## 2021-05-28 PROCEDURE — 1126F PR PAIN SEVERITY QUANTIFIED, NO PAIN PRESENT: ICD-10-PCS | Mod: S$GLB,,, | Performed by: UROLOGY

## 2021-05-28 PROCEDURE — 99214 PR OFFICE/OUTPT VISIT, EST, LEVL IV, 30-39 MIN: ICD-10-PCS | Mod: S$GLB,,, | Performed by: UROLOGY

## 2021-05-28 PROCEDURE — 1126F AMNT PAIN NOTED NONE PRSNT: CPT | Mod: S$GLB,,, | Performed by: UROLOGY

## 2021-05-28 PROCEDURE — 3288F FALL RISK ASSESSMENT DOCD: CPT | Mod: CPTII,S$GLB,, | Performed by: UROLOGY

## 2021-05-28 PROCEDURE — 1159F PR MEDICATION LIST DOCUMENTED IN MEDICAL RECORD: ICD-10-PCS | Mod: S$GLB,,, | Performed by: UROLOGY

## 2021-05-31 ENCOUNTER — TELEPHONE (OUTPATIENT)
Dept: FAMILY MEDICINE | Facility: CLINIC | Age: 76
End: 2021-05-31

## 2021-06-10 ENCOUNTER — OFFICE VISIT (OUTPATIENT)
Dept: SURGERY | Facility: CLINIC | Age: 76
End: 2021-06-10
Payer: MEDICARE

## 2021-06-10 VITALS
OXYGEN SATURATION: 99 % | SYSTOLIC BLOOD PRESSURE: 138 MMHG | HEIGHT: 66 IN | TEMPERATURE: 99 F | WEIGHT: 160.5 LBS | BODY MASS INDEX: 25.79 KG/M2 | DIASTOLIC BLOOD PRESSURE: 62 MMHG | HEART RATE: 64 BPM

## 2021-06-10 DIAGNOSIS — C73 THYROID CANCER: Primary | ICD-10-CM

## 2021-06-10 PROCEDURE — 99999 PR PBB SHADOW E&M-EST. PATIENT-LVL IV: CPT | Mod: PBBFAC,,, | Performed by: SURGERY

## 2021-06-10 PROCEDURE — 1101F PR PT FALLS ASSESS DOC 0-1 FALLS W/OUT INJ PAST YR: ICD-10-PCS | Mod: CPTII,S$GLB,, | Performed by: SURGERY

## 2021-06-10 PROCEDURE — 99024 POSTOP FOLLOW-UP VISIT: CPT | Mod: S$GLB,,, | Performed by: SURGERY

## 2021-06-10 PROCEDURE — 1126F PR PAIN SEVERITY QUANTIFIED, NO PAIN PRESENT: ICD-10-PCS | Mod: S$GLB,,, | Performed by: SURGERY

## 2021-06-10 PROCEDURE — 1101F PT FALLS ASSESS-DOCD LE1/YR: CPT | Mod: CPTII,S$GLB,, | Performed by: SURGERY

## 2021-06-10 PROCEDURE — 3288F PR FALLS RISK ASSESSMENT DOCUMENTED: ICD-10-PCS | Mod: CPTII,S$GLB,, | Performed by: SURGERY

## 2021-06-10 PROCEDURE — 1126F AMNT PAIN NOTED NONE PRSNT: CPT | Mod: S$GLB,,, | Performed by: SURGERY

## 2021-06-10 PROCEDURE — 99999 PR PBB SHADOW E&M-EST. PATIENT-LVL IV: ICD-10-PCS | Mod: PBBFAC,,, | Performed by: SURGERY

## 2021-06-10 PROCEDURE — 3288F FALL RISK ASSESSMENT DOCD: CPT | Mod: CPTII,S$GLB,, | Performed by: SURGERY

## 2021-06-10 PROCEDURE — 99024 PR POST-OP FOLLOW-UP VISIT: ICD-10-PCS | Mod: S$GLB,,, | Performed by: SURGERY

## 2021-06-11 ENCOUNTER — TELEPHONE (OUTPATIENT)
Dept: ENDOCRINOLOGY | Facility: CLINIC | Age: 76
End: 2021-06-11

## 2021-06-11 ENCOUNTER — LAB VISIT (OUTPATIENT)
Dept: LAB | Facility: HOSPITAL | Age: 76
End: 2021-06-11
Attending: SURGERY
Payer: MEDICARE

## 2021-06-11 DIAGNOSIS — C73 THYROID CANCER: Primary | ICD-10-CM

## 2021-06-11 DIAGNOSIS — C73 THYROID CANCER: ICD-10-CM

## 2021-06-11 LAB — PTH-INTACT SERPL-MCNC: 12 PG/ML (ref 9–77)

## 2021-06-11 PROCEDURE — 80069 RENAL FUNCTION PANEL: CPT | Performed by: SURGERY

## 2021-06-11 PROCEDURE — 36415 COLL VENOUS BLD VENIPUNCTURE: CPT | Mod: PO | Performed by: SURGERY

## 2021-06-11 PROCEDURE — 83970 ASSAY OF PARATHORMONE: CPT | Performed by: SURGERY

## 2021-06-12 LAB
ALBUMIN SERPL BCP-MCNC: 4 G/DL (ref 3.5–5.2)
ANION GAP SERPL CALC-SCNC: 9 MMOL/L (ref 8–16)
BUN SERPL-MCNC: 13 MG/DL (ref 8–23)
CALCIUM SERPL-MCNC: 10.3 MG/DL (ref 8.7–10.5)
CHLORIDE SERPL-SCNC: 100 MMOL/L (ref 95–110)
CO2 SERPL-SCNC: 31 MMOL/L (ref 23–29)
CREAT SERPL-MCNC: 1.3 MG/DL (ref 0.5–1.4)
EST. GFR  (AFRICAN AMERICAN): 45.7 ML/MIN/1.73 M^2
EST. GFR  (NON AFRICAN AMERICAN): 39.7 ML/MIN/1.73 M^2
GLUCOSE SERPL-MCNC: 107 MG/DL (ref 70–110)
PHOSPHATE SERPL-MCNC: 4.3 MG/DL (ref 2.7–4.5)
POTASSIUM SERPL-SCNC: 4.6 MMOL/L (ref 3.5–5.1)
SODIUM SERPL-SCNC: 140 MMOL/L (ref 136–145)

## 2021-06-14 ENCOUNTER — TELEPHONE (OUTPATIENT)
Dept: ENDOCRINOLOGY | Facility: CLINIC | Age: 76
End: 2021-06-14

## 2021-06-14 DIAGNOSIS — E89.0 POST-SURGICAL HYPOTHYROIDISM: ICD-10-CM

## 2021-06-14 DIAGNOSIS — M81.0 AGE-RELATED OSTEOPOROSIS WITHOUT CURRENT PATHOLOGICAL FRACTURE: Primary | ICD-10-CM

## 2021-06-14 RX ORDER — LEVOTHYROXINE SODIUM 100 UG/1
100 TABLET ORAL
Qty: 30 TABLET | Refills: 11 | Status: SHIPPED | OUTPATIENT
Start: 2021-06-14 | End: 2021-07-06

## 2021-06-16 ENCOUNTER — HOSPITAL ENCOUNTER (OUTPATIENT)
Dept: RADIOLOGY | Facility: HOSPITAL | Age: 76
Discharge: HOME | End: 2021-06-16
Attending: PHYSICIAN ASSISTANT
Payer: MEDICARE

## 2021-06-16 DIAGNOSIS — R59.9 ENLARGED LYMPH NODES, UNSPECIFIED: ICD-10-CM

## 2021-06-16 DIAGNOSIS — N63.0 BREAST MASS IN FEMALE: ICD-10-CM

## 2021-06-16 PROCEDURE — 77062 MAMMO DIGITAL DIAGNOSTIC BILAT WITH TOMO: ICD-10-PCS | Mod: 26,,, | Performed by: RADIOLOGY

## 2021-06-16 PROCEDURE — 77062 BREAST TOMOSYNTHESIS BI: CPT | Mod: 26,,, | Performed by: RADIOLOGY

## 2021-06-16 PROCEDURE — 77066 DX MAMMO INCL CAD BI: CPT | Mod: TC

## 2021-06-16 PROCEDURE — 77066 DX MAMMO INCL CAD BI: CPT | Mod: 26,,, | Performed by: RADIOLOGY

## 2021-06-16 PROCEDURE — 77066 MAMMO DIGITAL DIAGNOSTIC BILAT WITH TOMO: ICD-10-PCS | Mod: 26,,, | Performed by: RADIOLOGY

## 2021-06-30 ENCOUNTER — LAB VISIT (OUTPATIENT)
Dept: LAB | Facility: HOSPITAL | Age: 76
End: 2021-06-30
Attending: INTERNAL MEDICINE
Payer: MEDICARE

## 2021-06-30 DIAGNOSIS — C73 THYROID CANCER: ICD-10-CM

## 2021-06-30 DIAGNOSIS — M81.0 AGE-RELATED OSTEOPOROSIS WITHOUT CURRENT PATHOLOGICAL FRACTURE: ICD-10-CM

## 2021-06-30 LAB
ALBUMIN SERPL BCP-MCNC: 4 G/DL (ref 3.5–5.2)
ALP SERPL-CCNC: 38 U/L (ref 55–135)
ALT SERPL W/O P-5'-P-CCNC: 23 U/L (ref 10–44)
ANION GAP SERPL CALC-SCNC: 13 MMOL/L (ref 8–16)
AST SERPL-CCNC: 26 U/L (ref 10–40)
BILIRUB SERPL-MCNC: 0.7 MG/DL (ref 0.1–1)
BUN SERPL-MCNC: 15 MG/DL (ref 8–23)
CALCIUM SERPL-MCNC: 9.3 MG/DL (ref 8.7–10.5)
CHLORIDE SERPL-SCNC: 105 MMOL/L (ref 95–110)
CO2 SERPL-SCNC: 24 MMOL/L (ref 23–29)
CREAT SERPL-MCNC: 1 MG/DL (ref 0.5–1.4)
EST. GFR  (AFRICAN AMERICAN): >60 ML/MIN/1.73 M^2
EST. GFR  (NON AFRICAN AMERICAN): 54.5 ML/MIN/1.73 M^2
GLUCOSE SERPL-MCNC: 111 MG/DL (ref 70–110)
POTASSIUM SERPL-SCNC: 4 MMOL/L (ref 3.5–5.1)
PROT SERPL-MCNC: 7.2 G/DL (ref 6–8.4)
SODIUM SERPL-SCNC: 142 MMOL/L (ref 136–145)
T4 FREE SERPL-MCNC: 1.26 NG/DL (ref 0.71–1.51)
TSH SERPL DL<=0.005 MIU/L-ACNC: 2.92 UIU/ML (ref 0.4–4)

## 2021-06-30 PROCEDURE — 86800 THYROGLOBULIN ANTIBODY: CPT | Performed by: INTERNAL MEDICINE

## 2021-06-30 PROCEDURE — 84439 ASSAY OF FREE THYROXINE: CPT | Performed by: INTERNAL MEDICINE

## 2021-06-30 PROCEDURE — 80053 COMPREHEN METABOLIC PANEL: CPT | Performed by: INTERNAL MEDICINE

## 2021-06-30 PROCEDURE — 36415 COLL VENOUS BLD VENIPUNCTURE: CPT | Mod: PO | Performed by: INTERNAL MEDICINE

## 2021-06-30 PROCEDURE — 84443 ASSAY THYROID STIM HORMONE: CPT | Performed by: INTERNAL MEDICINE

## 2021-07-02 LAB
THRYOGLOBULIN INTERPRETATION: ABNORMAL
THYROGLOB AB SERPL-ACNC: <1.8 IU/ML
THYROGLOB SERPL-MCNC: 91 NG/ML

## 2021-07-03 ENCOUNTER — PATIENT OUTREACH (OUTPATIENT)
Dept: ADMINISTRATIVE | Facility: OTHER | Age: 76
End: 2021-07-03

## 2021-07-06 ENCOUNTER — OFFICE VISIT (OUTPATIENT)
Dept: ENDOCRINOLOGY | Facility: CLINIC | Age: 76
End: 2021-07-06
Payer: MEDICARE

## 2021-07-06 VITALS
OXYGEN SATURATION: 99 % | DIASTOLIC BLOOD PRESSURE: 68 MMHG | TEMPERATURE: 98 F | HEART RATE: 83 BPM | SYSTOLIC BLOOD PRESSURE: 132 MMHG | WEIGHT: 160.94 LBS | BODY MASS INDEX: 25.86 KG/M2 | HEIGHT: 66 IN | RESPIRATION RATE: 20 BRPM

## 2021-07-06 DIAGNOSIS — E89.0 POST-SURGICAL HYPOTHYROIDISM: ICD-10-CM

## 2021-07-06 DIAGNOSIS — C73 THYROID CANCER: Primary | ICD-10-CM

## 2021-07-06 PROCEDURE — 99999 PR PBB SHADOW E&M-EST. PATIENT-LVL IV: ICD-10-PCS | Mod: PBBFAC,,, | Performed by: INTERNAL MEDICINE

## 2021-07-06 PROCEDURE — 1126F AMNT PAIN NOTED NONE PRSNT: CPT | Mod: S$GLB,,, | Performed by: INTERNAL MEDICINE

## 2021-07-06 PROCEDURE — 99999 PR PBB SHADOW E&M-EST. PATIENT-LVL IV: CPT | Mod: PBBFAC,,, | Performed by: INTERNAL MEDICINE

## 2021-07-06 PROCEDURE — 99215 OFFICE O/P EST HI 40 MIN: CPT | Mod: S$GLB,,, | Performed by: INTERNAL MEDICINE

## 2021-07-06 PROCEDURE — 1126F PR PAIN SEVERITY QUANTIFIED, NO PAIN PRESENT: ICD-10-PCS | Mod: S$GLB,,, | Performed by: INTERNAL MEDICINE

## 2021-07-06 PROCEDURE — 99215 PR OFFICE/OUTPT VISIT, EST, LEVL V, 40-54 MIN: ICD-10-PCS | Mod: S$GLB,,, | Performed by: INTERNAL MEDICINE

## 2021-07-06 RX ORDER — LEVOTHYROXINE SODIUM 112 UG/1
112 TABLET ORAL
Qty: 30 TABLET | Refills: 11 | Status: SHIPPED | OUTPATIENT
Start: 2021-07-06 | End: 2022-03-24

## 2021-07-09 ENCOUNTER — TELEPHONE (OUTPATIENT)
Dept: ENDOCRINOLOGY | Facility: CLINIC | Age: 76
End: 2021-07-09

## 2021-07-12 ENCOUNTER — TELEPHONE (OUTPATIENT)
Dept: ENDOCRINOLOGY | Facility: CLINIC | Age: 76
End: 2021-07-12

## 2021-07-12 ENCOUNTER — NURSE TRIAGE (OUTPATIENT)
Dept: ADMINISTRATIVE | Facility: CLINIC | Age: 76
End: 2021-07-12

## 2021-07-13 ENCOUNTER — OFFICE VISIT (OUTPATIENT)
Dept: FAMILY MEDICINE | Facility: CLINIC | Age: 76
End: 2021-07-13
Attending: FAMILY MEDICINE
Payer: MEDICARE

## 2021-07-13 VITALS
DIASTOLIC BLOOD PRESSURE: 62 MMHG | OXYGEN SATURATION: 95 % | BODY MASS INDEX: 25.58 KG/M2 | WEIGHT: 159.19 LBS | SYSTOLIC BLOOD PRESSURE: 120 MMHG | RESPIRATION RATE: 18 BRPM | HEIGHT: 66 IN | HEART RATE: 60 BPM

## 2021-07-13 DIAGNOSIS — M62.838 MUSCLE SPASM: ICD-10-CM

## 2021-07-13 DIAGNOSIS — M54.50 CHRONIC BILATERAL LOW BACK PAIN, UNSPECIFIED WHETHER SCIATICA PRESENT: ICD-10-CM

## 2021-07-13 DIAGNOSIS — M25.519 NECK AND SHOULDER PAIN: Primary | ICD-10-CM

## 2021-07-13 DIAGNOSIS — M17.11 PRIMARY OSTEOARTHRITIS OF RIGHT KNEE: ICD-10-CM

## 2021-07-13 DIAGNOSIS — G89.29 CHRONIC BILATERAL LOW BACK PAIN, UNSPECIFIED WHETHER SCIATICA PRESENT: ICD-10-CM

## 2021-07-13 DIAGNOSIS — M54.2 NECK AND SHOULDER PAIN: Primary | ICD-10-CM

## 2021-07-13 PROCEDURE — 99999 PR PBB SHADOW E&M-EST. PATIENT-LVL IV: ICD-10-PCS | Mod: PBBFAC,,, | Performed by: NURSE PRACTITIONER

## 2021-07-13 PROCEDURE — 99999 PR PBB SHADOW E&M-EST. PATIENT-LVL IV: CPT | Mod: PBBFAC,,, | Performed by: NURSE PRACTITIONER

## 2021-07-13 PROCEDURE — 3288F PR FALLS RISK ASSESSMENT DOCUMENTED: ICD-10-PCS | Mod: CPTII,S$GLB,, | Performed by: NURSE PRACTITIONER

## 2021-07-13 PROCEDURE — 3288F FALL RISK ASSESSMENT DOCD: CPT | Mod: CPTII,S$GLB,, | Performed by: NURSE PRACTITIONER

## 2021-07-13 PROCEDURE — 1159F PR MEDICATION LIST DOCUMENTED IN MEDICAL RECORD: ICD-10-PCS | Mod: S$GLB,,, | Performed by: NURSE PRACTITIONER

## 2021-07-13 PROCEDURE — 1159F MED LIST DOCD IN RCRD: CPT | Mod: S$GLB,,, | Performed by: NURSE PRACTITIONER

## 2021-07-13 PROCEDURE — 1101F PR PT FALLS ASSESS DOC 0-1 FALLS W/OUT INJ PAST YR: ICD-10-PCS | Mod: CPTII,S$GLB,, | Performed by: NURSE PRACTITIONER

## 2021-07-13 PROCEDURE — 99214 OFFICE O/P EST MOD 30 MIN: CPT | Mod: S$GLB,,, | Performed by: NURSE PRACTITIONER

## 2021-07-13 PROCEDURE — 1101F PT FALLS ASSESS-DOCD LE1/YR: CPT | Mod: CPTII,S$GLB,, | Performed by: NURSE PRACTITIONER

## 2021-07-13 PROCEDURE — 99214 PR OFFICE/OUTPT VISIT, EST, LEVL IV, 30-39 MIN: ICD-10-PCS | Mod: S$GLB,,, | Performed by: NURSE PRACTITIONER

## 2021-07-13 RX ORDER — TIZANIDINE 4 MG/1
4 TABLET ORAL EVERY 6 HOURS PRN
Qty: 30 TABLET | Refills: 1 | Status: SHIPPED | OUTPATIENT
Start: 2021-07-13 | End: 2021-07-28

## 2021-07-15 ENCOUNTER — TELEPHONE (OUTPATIENT)
Dept: ENDOCRINOLOGY | Facility: CLINIC | Age: 76
End: 2021-07-15

## 2021-07-15 DIAGNOSIS — E04.2 MULTIPLE THYROID NODULES: Primary | ICD-10-CM

## 2021-07-19 ENCOUNTER — TELEPHONE (OUTPATIENT)
Dept: ENDOCRINOLOGY | Facility: CLINIC | Age: 76
End: 2021-07-19

## 2021-07-20 ENCOUNTER — TELEPHONE (OUTPATIENT)
Dept: ENDOCRINOLOGY | Facility: CLINIC | Age: 76
End: 2021-07-20

## 2021-07-20 DIAGNOSIS — C73 MALIGNANT NEOPLASM OF THYROID GLAND: Primary | ICD-10-CM

## 2021-07-21 ENCOUNTER — OFFICE VISIT (OUTPATIENT)
Dept: OTOLARYNGOLOGY | Facility: CLINIC | Age: 76
End: 2021-07-21
Payer: MEDICARE

## 2021-07-21 VITALS — DIASTOLIC BLOOD PRESSURE: 72 MMHG | HEART RATE: 89 BPM | SYSTOLIC BLOOD PRESSURE: 134 MMHG

## 2021-07-21 DIAGNOSIS — J38.01 UNILATERAL COMPLETE VOCAL FOLD PARALYSIS: ICD-10-CM

## 2021-07-21 DIAGNOSIS — R49.0 DYSPHONIA: Primary | ICD-10-CM

## 2021-07-21 DIAGNOSIS — C73 MALIGNANT NEOPLASM OF THYROID GLAND: ICD-10-CM

## 2021-07-21 PROCEDURE — 99213 OFFICE O/P EST LOW 20 MIN: CPT | Mod: 25,S$GLB,, | Performed by: OTOLARYNGOLOGY

## 2021-07-21 PROCEDURE — 99499 UNLISTED E&M SERVICE: CPT | Mod: S$GLB,,, | Performed by: OTOLARYNGOLOGY

## 2021-07-21 PROCEDURE — 99213 PR OFFICE/OUTPT VISIT, EST, LEVL III, 20-29 MIN: ICD-10-PCS | Mod: 25,S$GLB,, | Performed by: OTOLARYNGOLOGY

## 2021-07-21 PROCEDURE — 99999 PR PBB SHADOW E&M-EST. PATIENT-LVL IV: CPT | Mod: PBBFAC,,, | Performed by: OTOLARYNGOLOGY

## 2021-07-21 PROCEDURE — 99499 RISK ADDL DX/OHS AUDIT: ICD-10-PCS | Mod: S$GLB,,, | Performed by: OTOLARYNGOLOGY

## 2021-07-21 PROCEDURE — 99999 PR PBB SHADOW E&M-EST. PATIENT-LVL IV: ICD-10-PCS | Mod: PBBFAC,,, | Performed by: OTOLARYNGOLOGY

## 2021-07-21 PROCEDURE — 31579 LARYNGOSCOPY TELESCOPIC: CPT | Mod: S$GLB,,, | Performed by: OTOLARYNGOLOGY

## 2021-07-21 PROCEDURE — 31579 PR LARYNGOSCOPY, FLEX/RIGID TELESCOPIC, W/STROBOSCOPY: ICD-10-PCS | Mod: S$GLB,,, | Performed by: OTOLARYNGOLOGY

## 2021-07-22 ENCOUNTER — HOSPITAL ENCOUNTER (OUTPATIENT)
Dept: RADIOLOGY | Facility: HOSPITAL | Age: 76
Discharge: HOME | End: 2021-07-22
Attending: INTERNAL MEDICINE
Payer: MEDICARE

## 2021-07-22 DIAGNOSIS — C73 MALIGNANT NEOPLASM OF THYROID GLAND: ICD-10-CM

## 2021-07-22 PROCEDURE — 25500020 PHARM REV CODE 255

## 2021-07-22 PROCEDURE — 70491 CT SOFT TISSUE NECK W/DYE: CPT | Mod: 26,,, | Performed by: RADIOLOGY

## 2021-07-22 PROCEDURE — 70491 CT SOFT TISSUE NECK W/DYE: CPT | Mod: TC

## 2021-07-22 PROCEDURE — 70491 CT SOFT TISSUE NECK WITH CONTRAST: ICD-10-PCS | Mod: 26,,, | Performed by: RADIOLOGY

## 2021-07-22 RX ADMIN — IOHEXOL 75 ML: 350 INJECTION, SOLUTION INTRAVENOUS at 01:07

## 2021-07-23 ENCOUNTER — TELEPHONE (OUTPATIENT)
Dept: ENDOCRINOLOGY | Facility: CLINIC | Age: 76
End: 2021-07-23

## 2021-07-27 ENCOUNTER — TELEPHONE (OUTPATIENT)
Dept: FAMILY MEDICINE | Facility: CLINIC | Age: 76
End: 2021-07-27

## 2021-07-27 DIAGNOSIS — Z91.89 AT HIGH RISK FOR ASPIRATION: Primary | ICD-10-CM

## 2021-07-27 DIAGNOSIS — K11.7 INCREASED OROPHARYNGEAL SECRETIONS: ICD-10-CM

## 2021-07-28 ENCOUNTER — TELEPHONE (OUTPATIENT)
Dept: ENDOCRINOLOGY | Facility: CLINIC | Age: 76
End: 2021-07-28

## 2021-07-29 ENCOUNTER — LAB VISIT (OUTPATIENT)
Dept: LAB | Facility: HOSPITAL | Age: 76
End: 2021-07-29
Attending: INTERNAL MEDICINE
Payer: MEDICARE

## 2021-07-29 ENCOUNTER — OFFICE VISIT (OUTPATIENT)
Dept: ENDOCRINOLOGY | Facility: CLINIC | Age: 76
End: 2021-07-29
Payer: MEDICARE

## 2021-07-29 ENCOUNTER — HOSPITAL ENCOUNTER (EMERGENCY)
Facility: HOSPITAL | Age: 76
Discharge: HOME | End: 2021-07-29
Attending: EMERGENCY MEDICINE
Payer: MEDICARE

## 2021-07-29 VITALS
OXYGEN SATURATION: 99 % | WEIGHT: 158.75 LBS | BODY MASS INDEX: 26.45 KG/M2 | HEIGHT: 65 IN | TEMPERATURE: 97 F | DIASTOLIC BLOOD PRESSURE: 76 MMHG | HEART RATE: 82 BPM | SYSTOLIC BLOOD PRESSURE: 130 MMHG

## 2021-07-29 VITALS
TEMPERATURE: 99 F | OXYGEN SATURATION: 99 % | WEIGHT: 162 LBS | BODY MASS INDEX: 26.99 KG/M2 | DIASTOLIC BLOOD PRESSURE: 67 MMHG | RESPIRATION RATE: 18 BRPM | HEIGHT: 65 IN | SYSTOLIC BLOOD PRESSURE: 141 MMHG | HEART RATE: 93 BPM

## 2021-07-29 DIAGNOSIS — C73 MALIGNANT NEOPLASM OF THYROID GLAND: ICD-10-CM

## 2021-07-29 DIAGNOSIS — R13.12 OROPHARYNGEAL DYSPHAGIA: Primary | ICD-10-CM

## 2021-07-29 DIAGNOSIS — E89.0 POSTOPERATIVE HYPOTHYROIDISM: ICD-10-CM

## 2021-07-29 DIAGNOSIS — C73 MALIGNANT NEOPLASM OF THYROID GLAND: Primary | ICD-10-CM

## 2021-07-29 PROBLEM — E04.2 MULTIPLE THYROID NODULES: Status: RESOLVED | Noted: 2021-03-30 | Resolved: 2021-07-29

## 2021-07-29 PROCEDURE — 3078F DIAST BP <80 MM HG: CPT | Mod: CPTII,S$GLB,, | Performed by: INTERNAL MEDICINE

## 2021-07-29 PROCEDURE — 99999 PR PBB SHADOW E&M-EST. PATIENT-LVL V: ICD-10-PCS | Mod: PBBFAC,,, | Performed by: INTERNAL MEDICINE

## 2021-07-29 PROCEDURE — 3075F SYST BP GE 130 - 139MM HG: CPT | Mod: CPTII,S$GLB,, | Performed by: INTERNAL MEDICINE

## 2021-07-29 PROCEDURE — 1101F PT FALLS ASSESS-DOCD LE1/YR: CPT | Mod: CPTII,S$GLB,, | Performed by: INTERNAL MEDICINE

## 2021-07-29 PROCEDURE — 84443 ASSAY THYROID STIM HORMONE: CPT | Performed by: INTERNAL MEDICINE

## 2021-07-29 PROCEDURE — 1159F PR MEDICATION LIST DOCUMENTED IN MEDICAL RECORD: ICD-10-PCS | Mod: CPTII,S$GLB,, | Performed by: INTERNAL MEDICINE

## 2021-07-29 PROCEDURE — 99215 PR OFFICE/OUTPT VISIT, EST, LEVL V, 40-54 MIN: ICD-10-PCS | Mod: S$GLB,,, | Performed by: INTERNAL MEDICINE

## 2021-07-29 PROCEDURE — 3288F FALL RISK ASSESSMENT DOCD: CPT | Mod: CPTII,S$GLB,, | Performed by: INTERNAL MEDICINE

## 2021-07-29 PROCEDURE — 36415 COLL VENOUS BLD VENIPUNCTURE: CPT | Mod: PO | Performed by: INTERNAL MEDICINE

## 2021-07-29 PROCEDURE — 1126F PR PAIN SEVERITY QUANTIFIED, NO PAIN PRESENT: ICD-10-PCS | Mod: CPTII,S$GLB,, | Performed by: INTERNAL MEDICINE

## 2021-07-29 PROCEDURE — 99999 PR PBB SHADOW E&M-EST. PATIENT-LVL V: CPT | Mod: PBBFAC,,, | Performed by: INTERNAL MEDICINE

## 2021-07-29 PROCEDURE — 1101F PR PT FALLS ASSESS DOC 0-1 FALLS W/OUT INJ PAST YR: ICD-10-PCS | Mod: CPTII,S$GLB,, | Performed by: INTERNAL MEDICINE

## 2021-07-29 PROCEDURE — 99215 OFFICE O/P EST HI 40 MIN: CPT | Mod: S$GLB,,, | Performed by: INTERNAL MEDICINE

## 2021-07-29 PROCEDURE — 1159F MED LIST DOCD IN RCRD: CPT | Mod: CPTII,S$GLB,, | Performed by: INTERNAL MEDICINE

## 2021-07-29 PROCEDURE — 1160F RVW MEDS BY RX/DR IN RCRD: CPT | Mod: CPTII,S$GLB,, | Performed by: INTERNAL MEDICINE

## 2021-07-29 PROCEDURE — 3288F PR FALLS RISK ASSESSMENT DOCUMENTED: ICD-10-PCS | Mod: CPTII,S$GLB,, | Performed by: INTERNAL MEDICINE

## 2021-07-29 PROCEDURE — 1160F PR REVIEW ALL MEDS BY PRESCRIBER/CLIN PHARMACIST DOCUMENTED: ICD-10-PCS | Mod: CPTII,S$GLB,, | Performed by: INTERNAL MEDICINE

## 2021-07-29 PROCEDURE — 84439 ASSAY OF FREE THYROXINE: CPT | Performed by: INTERNAL MEDICINE

## 2021-07-29 PROCEDURE — 3075F PR MOST RECENT SYSTOLIC BLOOD PRESS GE 130-139MM HG: ICD-10-PCS | Mod: CPTII,S$GLB,, | Performed by: INTERNAL MEDICINE

## 2021-07-29 PROCEDURE — 1126F AMNT PAIN NOTED NONE PRSNT: CPT | Mod: CPTII,S$GLB,, | Performed by: INTERNAL MEDICINE

## 2021-07-29 PROCEDURE — 3078F PR MOST RECENT DIASTOLIC BLOOD PRESSURE < 80 MM HG: ICD-10-PCS | Mod: CPTII,S$GLB,, | Performed by: INTERNAL MEDICINE

## 2021-07-29 PROCEDURE — 80053 COMPREHEN METABOLIC PANEL: CPT | Performed by: INTERNAL MEDICINE

## 2021-07-29 PROCEDURE — 99282 EMERGENCY DEPT VISIT SF MDM: CPT

## 2021-07-29 PROCEDURE — 86800 THYROGLOBULIN ANTIBODY: CPT | Performed by: INTERNAL MEDICINE

## 2021-07-30 ENCOUNTER — HOSPITAL ENCOUNTER (EMERGENCY)
Facility: HOSPITAL | Age: 76
Discharge: HOME | End: 2021-07-30
Attending: EMERGENCY MEDICINE
Payer: MEDICARE

## 2021-07-30 ENCOUNTER — TELEPHONE (OUTPATIENT)
Dept: ENDOCRINOLOGY | Facility: CLINIC | Age: 76
End: 2021-07-30

## 2021-07-30 VITALS
TEMPERATURE: 98 F | BODY MASS INDEX: 26.33 KG/M2 | WEIGHT: 158 LBS | HEIGHT: 65 IN | SYSTOLIC BLOOD PRESSURE: 109 MMHG | RESPIRATION RATE: 13 BRPM | OXYGEN SATURATION: 98 % | HEART RATE: 73 BPM | DIASTOLIC BLOOD PRESSURE: 58 MMHG

## 2021-07-30 DIAGNOSIS — F41.1 ANXIETY REACTION: ICD-10-CM

## 2021-07-30 DIAGNOSIS — M54.12 CERVICAL RADICULOPATHY: ICD-10-CM

## 2021-07-30 DIAGNOSIS — T50.905A MEDICATION SIDE EFFECT, INITIAL ENCOUNTER: Primary | ICD-10-CM

## 2021-07-30 LAB
ALBUMIN SERPL BCP-MCNC: 3.8 G/DL (ref 3.5–5.2)
ALBUMIN SERPL BCP-MCNC: 4.1 G/DL (ref 3.5–5.2)
ALP SERPL-CCNC: 43 U/L (ref 55–135)
ALP SERPL-CCNC: 51 U/L (ref 55–135)
ALT SERPL W/O P-5'-P-CCNC: 15 U/L (ref 10–44)
ALT SERPL W/O P-5'-P-CCNC: 16 U/L (ref 10–44)
ANION GAP SERPL CALC-SCNC: 13 MMOL/L (ref 8–16)
ANION GAP SERPL CALC-SCNC: 8 MMOL/L (ref 8–16)
AST SERPL-CCNC: 16 U/L (ref 10–40)
AST SERPL-CCNC: 18 U/L (ref 10–40)
BASOPHILS # BLD AUTO: 0.05 K/UL (ref 0–0.2)
BASOPHILS NFR BLD: 1 % (ref 0–1.9)
BILIRUB SERPL-MCNC: 0.5 MG/DL (ref 0.1–1)
BILIRUB SERPL-MCNC: 0.5 MG/DL (ref 0.1–1)
BUN SERPL-MCNC: 16 MG/DL (ref 8–23)
BUN SERPL-MCNC: 18 MG/DL (ref 8–23)
CALCIUM SERPL-MCNC: 10 MG/DL (ref 8.7–10.5)
CALCIUM SERPL-MCNC: 9.1 MG/DL (ref 8.7–10.5)
CHLORIDE SERPL-SCNC: 104 MMOL/L (ref 95–110)
CHLORIDE SERPL-SCNC: 108 MMOL/L (ref 95–110)
CO2 SERPL-SCNC: 24 MMOL/L (ref 23–29)
CO2 SERPL-SCNC: 26 MMOL/L (ref 23–29)
CREAT SERPL-MCNC: 1 MG/DL (ref 0.5–1.4)
CREAT SERPL-MCNC: 1 MG/DL (ref 0.5–1.4)
DIFFERENTIAL METHOD: ABNORMAL
EOSINOPHIL # BLD AUTO: 0.3 K/UL (ref 0–0.5)
EOSINOPHIL NFR BLD: 6.2 % (ref 0–8)
ERYTHROCYTE [DISTWIDTH] IN BLOOD BY AUTOMATED COUNT: 12.3 % (ref 11.5–14.5)
EST. GFR  (AFRICAN AMERICAN): >60 ML/MIN/1.73 M^2
EST. GFR  (AFRICAN AMERICAN): >60 ML/MIN/1.73 M^2
EST. GFR  (NON AFRICAN AMERICAN): 54 ML/MIN/1.73 M^2
EST. GFR  (NON AFRICAN AMERICAN): 54.5 ML/MIN/1.73 M^2
GLUCOSE SERPL-MCNC: 107 MG/DL (ref 70–110)
GLUCOSE SERPL-MCNC: 67 MG/DL (ref 70–110)
HCT VFR BLD AUTO: 36.4 % (ref 37–48.5)
HGB BLD-MCNC: 11.5 G/DL (ref 12–16)
IMM GRANULOCYTES # BLD AUTO: 0.01 K/UL (ref 0–0.04)
IMM GRANULOCYTES NFR BLD AUTO: 0.2 % (ref 0–0.5)
LYMPHOCYTES # BLD AUTO: 2 K/UL (ref 1–4.8)
LYMPHOCYTES NFR BLD: 38.4 % (ref 18–48)
MCH RBC QN AUTO: 29.4 PG (ref 27–31)
MCHC RBC AUTO-ENTMCNC: 31.6 G/DL (ref 32–36)
MCV RBC AUTO: 93 FL (ref 82–98)
MONOCYTES # BLD AUTO: 0.4 K/UL (ref 0.3–1)
MONOCYTES NFR BLD: 7.7 % (ref 4–15)
NEUTROPHILS # BLD AUTO: 2.4 K/UL (ref 1.8–7.7)
NEUTROPHILS NFR BLD: 46.5 % (ref 38–73)
NRBC BLD-RTO: 0 /100 WBC
PLATELET # BLD AUTO: 294 K/UL (ref 150–450)
PMV BLD AUTO: 9.1 FL (ref 9.2–12.9)
POTASSIUM SERPL-SCNC: 4.1 MMOL/L (ref 3.5–5.1)
POTASSIUM SERPL-SCNC: 4.9 MMOL/L (ref 3.5–5.1)
PROT SERPL-MCNC: 7.1 G/DL (ref 6–8.4)
PROT SERPL-MCNC: 8.1 G/DL (ref 6–8.4)
RBC # BLD AUTO: 3.91 M/UL (ref 4–5.4)
SODIUM SERPL-SCNC: 141 MMOL/L (ref 136–145)
SODIUM SERPL-SCNC: 142 MMOL/L (ref 136–145)
T4 FREE SERPL-MCNC: 1.37 NG/DL (ref 0.71–1.51)
TSH SERPL DL<=0.005 MIU/L-ACNC: 0.04 UIU/ML (ref 0.4–4)
WBC # BLD AUTO: 5.18 K/UL (ref 3.9–12.7)

## 2021-07-30 PROCEDURE — 80053 COMPREHEN METABOLIC PANEL: CPT | Performed by: EMERGENCY MEDICINE

## 2021-07-30 PROCEDURE — 63600175 PHARM REV CODE 636 W HCPCS: Performed by: EMERGENCY MEDICINE

## 2021-07-30 PROCEDURE — 85025 COMPLETE CBC W/AUTO DIFF WBC: CPT | Performed by: EMERGENCY MEDICINE

## 2021-07-30 PROCEDURE — 96374 THER/PROPH/DIAG INJ IV PUSH: CPT

## 2021-07-30 PROCEDURE — 99285 EMERGENCY DEPT VISIT HI MDM: CPT | Mod: 25

## 2021-07-30 RX ORDER — LORAZEPAM 2 MG/ML
1 INJECTION INTRAMUSCULAR
Status: COMPLETED | OUTPATIENT
Start: 2021-07-30 | End: 2021-07-30

## 2021-07-30 RX ORDER — CLONAZEPAM 0.5 MG/1
0.5 TABLET ORAL 2 TIMES DAILY PRN
Qty: 10 TABLET | Refills: 0 | Status: SHIPPED | OUTPATIENT
Start: 2021-07-30 | End: 2022-02-10

## 2021-07-30 RX ADMIN — LORAZEPAM 1 MG: 2 INJECTION, SOLUTION INTRAMUSCULAR; INTRAVENOUS at 03:07

## 2021-07-31 LAB
THRYOGLOBULIN INTERPRETATION: ABNORMAL
THYROGLOB AB SERPL-ACNC: <1.8 IU/ML
THYROGLOB SERPL-MCNC: 12 NG/ML

## 2021-08-02 ENCOUNTER — TELEPHONE (OUTPATIENT)
Dept: FAMILY MEDICINE | Facility: CLINIC | Age: 76
End: 2021-08-02

## 2021-08-02 ENCOUNTER — OFFICE VISIT (OUTPATIENT)
Dept: OTOLARYNGOLOGY | Facility: CLINIC | Age: 76
End: 2021-08-02
Payer: MEDICARE

## 2021-08-02 VITALS
DIASTOLIC BLOOD PRESSURE: 79 MMHG | SYSTOLIC BLOOD PRESSURE: 144 MMHG | HEART RATE: 74 BPM | WEIGHT: 160.94 LBS | BODY MASS INDEX: 26.78 KG/M2

## 2021-08-02 DIAGNOSIS — C73 PAPILLARY THYROID CARCINOMA: ICD-10-CM

## 2021-08-02 DIAGNOSIS — C73 THYROID CANCER: Primary | ICD-10-CM

## 2021-08-02 DIAGNOSIS — Z03.818 ENCOUNTER FOR OBSERVATION FOR SUSPECTED EXPOSURE TO OTHER BIOLOGICAL AGENTS RULED OUT: ICD-10-CM

## 2021-08-02 PROCEDURE — 1126F AMNT PAIN NOTED NONE PRSNT: CPT | Mod: CPTII,S$GLB,, | Performed by: OTOLARYNGOLOGY

## 2021-08-02 PROCEDURE — 3288F FALL RISK ASSESSMENT DOCD: CPT | Mod: CPTII,S$GLB,, | Performed by: OTOLARYNGOLOGY

## 2021-08-02 PROCEDURE — 3078F PR MOST RECENT DIASTOLIC BLOOD PRESSURE < 80 MM HG: ICD-10-PCS | Mod: CPTII,S$GLB,, | Performed by: OTOLARYNGOLOGY

## 2021-08-02 PROCEDURE — 3077F PR MOST RECENT SYSTOLIC BLOOD PRESSURE >= 140 MM HG: ICD-10-PCS | Mod: CPTII,S$GLB,, | Performed by: OTOLARYNGOLOGY

## 2021-08-02 PROCEDURE — 99215 PR OFFICE/OUTPT VISIT, EST, LEVL V, 40-54 MIN: ICD-10-PCS | Mod: S$GLB,,, | Performed by: OTOLARYNGOLOGY

## 2021-08-02 PROCEDURE — 99499 RISK ADDL DX/OHS AUDIT: ICD-10-PCS | Mod: S$GLB,,, | Performed by: OTOLARYNGOLOGY

## 2021-08-02 PROCEDURE — 1126F PR PAIN SEVERITY QUANTIFIED, NO PAIN PRESENT: ICD-10-PCS | Mod: CPTII,S$GLB,, | Performed by: OTOLARYNGOLOGY

## 2021-08-02 PROCEDURE — 1101F PR PT FALLS ASSESS DOC 0-1 FALLS W/OUT INJ PAST YR: ICD-10-PCS | Mod: CPTII,S$GLB,, | Performed by: OTOLARYNGOLOGY

## 2021-08-02 PROCEDURE — 99999 PR PBB SHADOW E&M-EST. PATIENT-LVL V: CPT | Mod: PBBFAC,,, | Performed by: OTOLARYNGOLOGY

## 2021-08-02 PROCEDURE — 3288F PR FALLS RISK ASSESSMENT DOCUMENTED: ICD-10-PCS | Mod: CPTII,S$GLB,, | Performed by: OTOLARYNGOLOGY

## 2021-08-02 PROCEDURE — 3078F DIAST BP <80 MM HG: CPT | Mod: CPTII,S$GLB,, | Performed by: OTOLARYNGOLOGY

## 2021-08-02 PROCEDURE — 99999 PR PBB SHADOW E&M-EST. PATIENT-LVL V: ICD-10-PCS | Mod: PBBFAC,,, | Performed by: OTOLARYNGOLOGY

## 2021-08-02 PROCEDURE — 99215 OFFICE O/P EST HI 40 MIN: CPT | Mod: S$GLB,,, | Performed by: OTOLARYNGOLOGY

## 2021-08-02 PROCEDURE — 99499 UNLISTED E&M SERVICE: CPT | Mod: S$GLB,,, | Performed by: OTOLARYNGOLOGY

## 2021-08-02 PROCEDURE — 1101F PT FALLS ASSESS-DOCD LE1/YR: CPT | Mod: CPTII,S$GLB,, | Performed by: OTOLARYNGOLOGY

## 2021-08-02 PROCEDURE — 3077F SYST BP >= 140 MM HG: CPT | Mod: CPTII,S$GLB,, | Performed by: OTOLARYNGOLOGY

## 2021-08-02 RX ORDER — LIDOCAINE HYDROCHLORIDE 10 MG/ML
1 INJECTION, SOLUTION EPIDURAL; INFILTRATION; INTRACAUDAL; PERINEURAL ONCE
Status: CANCELLED | OUTPATIENT
Start: 2021-08-02 | End: 2021-08-02

## 2021-08-02 RX ORDER — SODIUM CHLORIDE 0.9 % (FLUSH) 0.9 %
10 SYRINGE (ML) INJECTION
Status: CANCELLED | OUTPATIENT
Start: 2021-08-02

## 2021-08-04 ENCOUNTER — TELEPHONE (OUTPATIENT)
Dept: FAMILY MEDICINE | Facility: CLINIC | Age: 76
End: 2021-08-04

## 2021-08-05 ENCOUNTER — TELEPHONE (OUTPATIENT)
Dept: NEUROLOGY | Facility: CLINIC | Age: 76
End: 2021-08-05

## 2021-08-05 ENCOUNTER — TELEPHONE (OUTPATIENT)
Dept: FAMILY MEDICINE | Facility: CLINIC | Age: 76
End: 2021-08-05

## 2021-08-09 ENCOUNTER — TELEPHONE (OUTPATIENT)
Dept: FAMILY MEDICINE | Facility: CLINIC | Age: 76
End: 2021-08-09

## 2021-08-10 ENCOUNTER — TELEPHONE (OUTPATIENT)
Dept: ENDOCRINOLOGY | Facility: CLINIC | Age: 76
End: 2021-08-10

## 2021-08-10 ENCOUNTER — CLINICAL SUPPORT (OUTPATIENT)
Dept: REHABILITATION | Facility: HOSPITAL | Age: 76
End: 2021-08-10
Payer: MEDICARE

## 2021-08-10 ENCOUNTER — TELEPHONE (OUTPATIENT)
Dept: FAMILY MEDICINE | Facility: CLINIC | Age: 76
End: 2021-08-10

## 2021-08-10 ENCOUNTER — OFFICE VISIT (OUTPATIENT)
Dept: FAMILY MEDICINE | Facility: CLINIC | Age: 76
End: 2021-08-10
Payer: MEDICARE

## 2021-08-10 VITALS
TEMPERATURE: 99 F | WEIGHT: 158.31 LBS | OXYGEN SATURATION: 99 % | DIASTOLIC BLOOD PRESSURE: 60 MMHG | HEIGHT: 65 IN | BODY MASS INDEX: 26.38 KG/M2 | SYSTOLIC BLOOD PRESSURE: 130 MMHG | HEART RATE: 99 BPM | RESPIRATION RATE: 16 BRPM

## 2021-08-10 DIAGNOSIS — G25.9 EXTRAPYRAMIDAL MOVEMENTS PRESENT: ICD-10-CM

## 2021-08-10 DIAGNOSIS — M54.50 CHRONIC BILATERAL LOW BACK PAIN, UNSPECIFIED WHETHER SCIATICA PRESENT: ICD-10-CM

## 2021-08-10 DIAGNOSIS — M25.561 BILATERAL CHRONIC KNEE PAIN: ICD-10-CM

## 2021-08-10 DIAGNOSIS — G89.29 BILATERAL CHRONIC KNEE PAIN: ICD-10-CM

## 2021-08-10 DIAGNOSIS — T88.7XXA MEDICATION SIDE EFFECT: Primary | ICD-10-CM

## 2021-08-10 DIAGNOSIS — M25.519 NECK AND SHOULDER PAIN: ICD-10-CM

## 2021-08-10 DIAGNOSIS — M54.2 NECK AND SHOULDER PAIN: ICD-10-CM

## 2021-08-10 DIAGNOSIS — M54.50 LUMBAR PAIN: ICD-10-CM

## 2021-08-10 DIAGNOSIS — M25.562 BILATERAL CHRONIC KNEE PAIN: ICD-10-CM

## 2021-08-10 DIAGNOSIS — G89.29 CHRONIC BILATERAL LOW BACK PAIN, UNSPECIFIED WHETHER SCIATICA PRESENT: ICD-10-CM

## 2021-08-10 DIAGNOSIS — M54.2 CHRONIC CERVICAL PAIN: ICD-10-CM

## 2021-08-10 DIAGNOSIS — G89.29 CHRONIC CERVICAL PAIN: ICD-10-CM

## 2021-08-10 DIAGNOSIS — M17.11 PRIMARY OSTEOARTHRITIS OF RIGHT KNEE: ICD-10-CM

## 2021-08-10 PROCEDURE — 3078F DIAST BP <80 MM HG: CPT | Mod: CPTII,S$GLB,, | Performed by: PHYSICIAN ASSISTANT

## 2021-08-10 PROCEDURE — 1101F PT FALLS ASSESS-DOCD LE1/YR: CPT | Mod: CPTII,S$GLB,, | Performed by: PHYSICIAN ASSISTANT

## 2021-08-10 PROCEDURE — 99999 PR PBB SHADOW E&M-EST. PATIENT-LVL V: ICD-10-PCS | Mod: PBBFAC,,, | Performed by: PHYSICIAN ASSISTANT

## 2021-08-10 PROCEDURE — 99214 OFFICE O/P EST MOD 30 MIN: CPT | Mod: S$GLB,,, | Performed by: PHYSICIAN ASSISTANT

## 2021-08-10 PROCEDURE — 1159F MED LIST DOCD IN RCRD: CPT | Mod: CPTII,S$GLB,, | Performed by: PHYSICIAN ASSISTANT

## 2021-08-10 PROCEDURE — 99214 PR OFFICE/OUTPT VISIT, EST, LEVL IV, 30-39 MIN: ICD-10-PCS | Mod: S$GLB,,, | Performed by: PHYSICIAN ASSISTANT

## 2021-08-10 PROCEDURE — 97161 PT EVAL LOW COMPLEX 20 MIN: CPT | Mod: PN

## 2021-08-10 PROCEDURE — 1159F PR MEDICATION LIST DOCUMENTED IN MEDICAL RECORD: ICD-10-PCS | Mod: CPTII,S$GLB,, | Performed by: PHYSICIAN ASSISTANT

## 2021-08-10 PROCEDURE — 1125F AMNT PAIN NOTED PAIN PRSNT: CPT | Mod: CPTII,S$GLB,, | Performed by: PHYSICIAN ASSISTANT

## 2021-08-10 PROCEDURE — 99999 PR PBB SHADOW E&M-EST. PATIENT-LVL V: CPT | Mod: PBBFAC,,, | Performed by: PHYSICIAN ASSISTANT

## 2021-08-10 PROCEDURE — 97110 THERAPEUTIC EXERCISES: CPT | Mod: PN

## 2021-08-10 PROCEDURE — 1160F RVW MEDS BY RX/DR IN RCRD: CPT | Mod: CPTII,S$GLB,, | Performed by: PHYSICIAN ASSISTANT

## 2021-08-10 PROCEDURE — 3288F FALL RISK ASSESSMENT DOCD: CPT | Mod: CPTII,S$GLB,, | Performed by: PHYSICIAN ASSISTANT

## 2021-08-10 PROCEDURE — 3075F SYST BP GE 130 - 139MM HG: CPT | Mod: CPTII,S$GLB,, | Performed by: PHYSICIAN ASSISTANT

## 2021-08-10 PROCEDURE — 3075F PR MOST RECENT SYSTOLIC BLOOD PRESS GE 130-139MM HG: ICD-10-PCS | Mod: CPTII,S$GLB,, | Performed by: PHYSICIAN ASSISTANT

## 2021-08-10 PROCEDURE — 3288F PR FALLS RISK ASSESSMENT DOCUMENTED: ICD-10-PCS | Mod: CPTII,S$GLB,, | Performed by: PHYSICIAN ASSISTANT

## 2021-08-10 PROCEDURE — 3078F PR MOST RECENT DIASTOLIC BLOOD PRESSURE < 80 MM HG: ICD-10-PCS | Mod: CPTII,S$GLB,, | Performed by: PHYSICIAN ASSISTANT

## 2021-08-10 PROCEDURE — 1101F PR PT FALLS ASSESS DOC 0-1 FALLS W/OUT INJ PAST YR: ICD-10-PCS | Mod: CPTII,S$GLB,, | Performed by: PHYSICIAN ASSISTANT

## 2021-08-10 PROCEDURE — 1125F PR PAIN SEVERITY QUANTIFIED, PAIN PRESENT: ICD-10-PCS | Mod: CPTII,S$GLB,, | Performed by: PHYSICIAN ASSISTANT

## 2021-08-10 PROCEDURE — 1160F PR REVIEW ALL MEDS BY PRESCRIBER/CLIN PHARMACIST DOCUMENTED: ICD-10-PCS | Mod: CPTII,S$GLB,, | Performed by: PHYSICIAN ASSISTANT

## 2021-08-10 RX ORDER — HYDROXYZINE HYDROCHLORIDE 10 MG/1
TABLET, FILM COATED ORAL
Qty: 60 TABLET | Refills: 2 | Status: SHIPPED | OUTPATIENT
Start: 2021-08-10 | End: 2021-12-28 | Stop reason: CLARIF

## 2021-08-11 ENCOUNTER — TELEPHONE (OUTPATIENT)
Dept: FAMILY MEDICINE | Facility: CLINIC | Age: 76
End: 2021-08-11

## 2021-08-11 ENCOUNTER — HOSPITAL ENCOUNTER (EMERGENCY)
Facility: HOSPITAL | Age: 76
Discharge: HOME | End: 2021-08-11
Attending: EMERGENCY MEDICINE
Payer: MEDICARE

## 2021-08-11 VITALS
SYSTOLIC BLOOD PRESSURE: 160 MMHG | DIASTOLIC BLOOD PRESSURE: 73 MMHG | OXYGEN SATURATION: 98 % | BODY MASS INDEX: 25.83 KG/M2 | RESPIRATION RATE: 18 BRPM | HEART RATE: 91 BPM | TEMPERATURE: 98 F | HEIGHT: 65 IN | WEIGHT: 155 LBS

## 2021-08-11 DIAGNOSIS — R09.82 POST-NASAL DRIP: Primary | ICD-10-CM

## 2021-08-11 PROCEDURE — 99284 EMERGENCY DEPT VISIT MOD MDM: CPT

## 2021-08-11 RX ORDER — FLUTICASONE PROPIONATE 50 MCG
1 SPRAY, SUSPENSION (ML) NASAL 2 TIMES DAILY PRN
Qty: 15 G | Refills: 0 | Status: SHIPPED | OUTPATIENT
Start: 2021-08-11 | End: 2021-08-11 | Stop reason: SDUPTHER

## 2021-08-11 RX ORDER — FLUTICASONE PROPIONATE 50 MCG
1 SPRAY, SUSPENSION (ML) NASAL 2 TIMES DAILY PRN
Qty: 15 G | Refills: 0 | Status: SHIPPED | OUTPATIENT
Start: 2021-08-11 | End: 2021-12-28 | Stop reason: CLARIF

## 2021-08-11 RX ORDER — OXYMETAZOLINE HCL 0.05 %
1 SPRAY, NON-AEROSOL (ML) NASAL 2 TIMES DAILY
Qty: 15 ML | Refills: 0 | Status: SHIPPED | OUTPATIENT
Start: 2021-08-11 | End: 2021-08-14

## 2021-08-12 ENCOUNTER — PES CALL (OUTPATIENT)
Dept: ADMINISTRATIVE | Facility: CLINIC | Age: 76
End: 2021-08-12

## 2021-08-12 ENCOUNTER — OFFICE VISIT (OUTPATIENT)
Dept: NEUROLOGY | Facility: CLINIC | Age: 76
End: 2021-08-12
Payer: MEDICARE

## 2021-08-12 ENCOUNTER — PATIENT OUTREACH (OUTPATIENT)
Dept: ADMINISTRATIVE | Facility: OTHER | Age: 76
End: 2021-08-12

## 2021-08-12 VITALS
DIASTOLIC BLOOD PRESSURE: 64 MMHG | WEIGHT: 158.75 LBS | SYSTOLIC BLOOD PRESSURE: 145 MMHG | RESPIRATION RATE: 16 BRPM | BODY MASS INDEX: 26.41 KG/M2 | HEART RATE: 79 BPM

## 2021-08-12 DIAGNOSIS — I10 HYPERTENSION, UNSPECIFIED TYPE: ICD-10-CM

## 2021-08-12 DIAGNOSIS — R20.2 PARESTHESIA: Primary | ICD-10-CM

## 2021-08-12 PROCEDURE — 3288F PR FALLS RISK ASSESSMENT DOCUMENTED: ICD-10-PCS | Mod: CPTII,S$GLB,, | Performed by: PSYCHIATRY & NEUROLOGY

## 2021-08-12 PROCEDURE — 3077F SYST BP >= 140 MM HG: CPT | Mod: CPTII,S$GLB,, | Performed by: PSYCHIATRY & NEUROLOGY

## 2021-08-12 PROCEDURE — 1159F MED LIST DOCD IN RCRD: CPT | Mod: CPTII,S$GLB,, | Performed by: PSYCHIATRY & NEUROLOGY

## 2021-08-12 PROCEDURE — 1159F PR MEDICATION LIST DOCUMENTED IN MEDICAL RECORD: ICD-10-PCS | Mod: CPTII,S$GLB,, | Performed by: PSYCHIATRY & NEUROLOGY

## 2021-08-12 PROCEDURE — 1126F PR PAIN SEVERITY QUANTIFIED, NO PAIN PRESENT: ICD-10-PCS | Mod: CPTII,S$GLB,, | Performed by: PSYCHIATRY & NEUROLOGY

## 2021-08-12 PROCEDURE — 1126F AMNT PAIN NOTED NONE PRSNT: CPT | Mod: CPTII,S$GLB,, | Performed by: PSYCHIATRY & NEUROLOGY

## 2021-08-12 PROCEDURE — 99213 PR OFFICE/OUTPT VISIT, EST, LEVL III, 20-29 MIN: ICD-10-PCS | Mod: S$GLB,,, | Performed by: PSYCHIATRY & NEUROLOGY

## 2021-08-12 PROCEDURE — 1101F PR PT FALLS ASSESS DOC 0-1 FALLS W/OUT INJ PAST YR: ICD-10-PCS | Mod: CPTII,S$GLB,, | Performed by: PSYCHIATRY & NEUROLOGY

## 2021-08-12 PROCEDURE — 3288F FALL RISK ASSESSMENT DOCD: CPT | Mod: CPTII,S$GLB,, | Performed by: PSYCHIATRY & NEUROLOGY

## 2021-08-12 PROCEDURE — 1101F PT FALLS ASSESS-DOCD LE1/YR: CPT | Mod: CPTII,S$GLB,, | Performed by: PSYCHIATRY & NEUROLOGY

## 2021-08-12 PROCEDURE — 99999 PR PBB SHADOW E&M-EST. PATIENT-LVL III: CPT | Mod: PBBFAC,,, | Performed by: PSYCHIATRY & NEUROLOGY

## 2021-08-12 PROCEDURE — 99999 PR PBB SHADOW E&M-EST. PATIENT-LVL III: ICD-10-PCS | Mod: PBBFAC,,, | Performed by: PSYCHIATRY & NEUROLOGY

## 2021-08-12 PROCEDURE — 1160F RVW MEDS BY RX/DR IN RCRD: CPT | Mod: CPTII,S$GLB,, | Performed by: PSYCHIATRY & NEUROLOGY

## 2021-08-12 PROCEDURE — 3078F DIAST BP <80 MM HG: CPT | Mod: CPTII,S$GLB,, | Performed by: PSYCHIATRY & NEUROLOGY

## 2021-08-12 PROCEDURE — 99213 OFFICE O/P EST LOW 20 MIN: CPT | Mod: S$GLB,,, | Performed by: PSYCHIATRY & NEUROLOGY

## 2021-08-12 PROCEDURE — 1160F PR REVIEW ALL MEDS BY PRESCRIBER/CLIN PHARMACIST DOCUMENTED: ICD-10-PCS | Mod: CPTII,S$GLB,, | Performed by: PSYCHIATRY & NEUROLOGY

## 2021-08-12 PROCEDURE — 99499 RISK ADDL DX/OHS AUDIT: ICD-10-PCS | Mod: S$GLB,,, | Performed by: PSYCHIATRY & NEUROLOGY

## 2021-08-12 PROCEDURE — 3078F PR MOST RECENT DIASTOLIC BLOOD PRESSURE < 80 MM HG: ICD-10-PCS | Mod: CPTII,S$GLB,, | Performed by: PSYCHIATRY & NEUROLOGY

## 2021-08-12 PROCEDURE — 3077F PR MOST RECENT SYSTOLIC BLOOD PRESSURE >= 140 MM HG: ICD-10-PCS | Mod: CPTII,S$GLB,, | Performed by: PSYCHIATRY & NEUROLOGY

## 2021-08-12 PROCEDURE — 99499 UNLISTED E&M SERVICE: CPT | Mod: S$GLB,,, | Performed by: PSYCHIATRY & NEUROLOGY

## 2021-08-14 DIAGNOSIS — E78.5 HYPERLIPIDEMIA ASSOCIATED WITH TYPE 2 DIABETES MELLITUS: ICD-10-CM

## 2021-08-14 DIAGNOSIS — E11.69 HYPERLIPIDEMIA ASSOCIATED WITH TYPE 2 DIABETES MELLITUS: ICD-10-CM

## 2021-08-16 ENCOUNTER — CLINICAL SUPPORT (OUTPATIENT)
Dept: REHABILITATION | Facility: HOSPITAL | Age: 76
End: 2021-08-16
Payer: MEDICARE

## 2021-08-16 ENCOUNTER — TELEPHONE (OUTPATIENT)
Dept: OPHTHALMOLOGY | Facility: CLINIC | Age: 76
End: 2021-08-16

## 2021-08-16 DIAGNOSIS — M54.2 CHRONIC CERVICAL PAIN: ICD-10-CM

## 2021-08-16 DIAGNOSIS — G89.29 BILATERAL CHRONIC KNEE PAIN: ICD-10-CM

## 2021-08-16 DIAGNOSIS — G89.29 CHRONIC CERVICAL PAIN: ICD-10-CM

## 2021-08-16 DIAGNOSIS — M25.562 BILATERAL CHRONIC KNEE PAIN: ICD-10-CM

## 2021-08-16 DIAGNOSIS — M25.561 BILATERAL CHRONIC KNEE PAIN: ICD-10-CM

## 2021-08-16 DIAGNOSIS — M54.50 LUMBAR PAIN: ICD-10-CM

## 2021-08-16 PROCEDURE — 97110 THERAPEUTIC EXERCISES: CPT | Mod: PN,CQ

## 2021-08-17 ENCOUNTER — HOSPITAL ENCOUNTER (EMERGENCY)
Facility: HOSPITAL | Age: 76
Discharge: HOME | End: 2021-08-17
Attending: EMERGENCY MEDICINE
Payer: MEDICARE

## 2021-08-17 ENCOUNTER — OFFICE VISIT (OUTPATIENT)
Dept: OPHTHALMOLOGY | Facility: CLINIC | Age: 76
End: 2021-08-17
Payer: MEDICARE

## 2021-08-17 ENCOUNTER — LAB VISIT (OUTPATIENT)
Dept: LAB | Facility: HOSPITAL | Age: 76
End: 2021-08-17
Attending: INTERNAL MEDICINE
Payer: MEDICARE

## 2021-08-17 VITALS
HEIGHT: 65 IN | OXYGEN SATURATION: 97 % | SYSTOLIC BLOOD PRESSURE: 160 MMHG | RESPIRATION RATE: 16 BRPM | BODY MASS INDEX: 26.66 KG/M2 | HEART RATE: 83 BPM | DIASTOLIC BLOOD PRESSURE: 66 MMHG | WEIGHT: 160 LBS | TEMPERATURE: 98 F

## 2021-08-17 DIAGNOSIS — H57.11 PAIN AROUND RIGHT EYE: Primary | ICD-10-CM

## 2021-08-17 DIAGNOSIS — E89.0 POST-SURGICAL HYPOTHYROIDISM: ICD-10-CM

## 2021-08-17 DIAGNOSIS — C73 THYROID CANCER: ICD-10-CM

## 2021-08-17 DIAGNOSIS — R13.12 OROPHARYNGEAL DYSPHAGIA: Primary | ICD-10-CM

## 2021-08-17 LAB
T4 FREE SERPL-MCNC: 1.49 NG/DL (ref 0.71–1.51)
TSH SERPL DL<=0.005 MIU/L-ACNC: 0.02 UIU/ML (ref 0.4–4)

## 2021-08-17 PROCEDURE — 99283 EMERGENCY DEPT VISIT LOW MDM: CPT

## 2021-08-17 PROCEDURE — 1159F PR MEDICATION LIST DOCUMENTED IN MEDICAL RECORD: ICD-10-PCS | Mod: CPTII,S$GLB,, | Performed by: OPHTHALMOLOGY

## 2021-08-17 PROCEDURE — 1160F RVW MEDS BY RX/DR IN RCRD: CPT | Mod: CPTII,S$GLB,, | Performed by: OPHTHALMOLOGY

## 2021-08-17 PROCEDURE — 99999 PR PBB SHADOW E&M-EST. PATIENT-LVL III: CPT | Mod: PBBFAC,,, | Performed by: OPHTHALMOLOGY

## 2021-08-17 PROCEDURE — 1159F MED LIST DOCD IN RCRD: CPT | Mod: CPTII,S$GLB,, | Performed by: OPHTHALMOLOGY

## 2021-08-17 PROCEDURE — 1160F PR REVIEW ALL MEDS BY PRESCRIBER/CLIN PHARMACIST DOCUMENTED: ICD-10-PCS | Mod: CPTII,S$GLB,, | Performed by: OPHTHALMOLOGY

## 2021-08-17 PROCEDURE — 84443 ASSAY THYROID STIM HORMONE: CPT | Performed by: INTERNAL MEDICINE

## 2021-08-17 PROCEDURE — 99999 PR PBB SHADOW E&M-EST. PATIENT-LVL III: ICD-10-PCS | Mod: PBBFAC,,, | Performed by: OPHTHALMOLOGY

## 2021-08-17 PROCEDURE — 36415 COLL VENOUS BLD VENIPUNCTURE: CPT | Mod: PO | Performed by: INTERNAL MEDICINE

## 2021-08-17 PROCEDURE — 99213 OFFICE O/P EST LOW 20 MIN: CPT | Mod: S$GLB,,, | Performed by: OPHTHALMOLOGY

## 2021-08-17 PROCEDURE — 1126F AMNT PAIN NOTED NONE PRSNT: CPT | Mod: CPTII,S$GLB,, | Performed by: OPHTHALMOLOGY

## 2021-08-17 PROCEDURE — 1126F PR PAIN SEVERITY QUANTIFIED, NO PAIN PRESENT: ICD-10-PCS | Mod: CPTII,S$GLB,, | Performed by: OPHTHALMOLOGY

## 2021-08-17 PROCEDURE — 84439 ASSAY OF FREE THYROXINE: CPT | Performed by: INTERNAL MEDICINE

## 2021-08-17 PROCEDURE — 99213 PR OFFICE/OUTPT VISIT, EST, LEVL III, 20-29 MIN: ICD-10-PCS | Mod: S$GLB,,, | Performed by: OPHTHALMOLOGY

## 2021-08-17 RX ORDER — ROSUVASTATIN CALCIUM 20 MG/1
20 TABLET, COATED ORAL NIGHTLY
Qty: 90 TABLET | Refills: 0 | Status: SHIPPED | OUTPATIENT
Start: 2021-08-17 | End: 2022-01-18

## 2021-08-18 ENCOUNTER — TELEPHONE (OUTPATIENT)
Dept: ENDOCRINOLOGY | Facility: CLINIC | Age: 76
End: 2021-08-18

## 2021-08-18 DIAGNOSIS — E89.0 POSTOPERATIVE HYPOTHYROIDISM: ICD-10-CM

## 2021-08-18 DIAGNOSIS — C73 MALIGNANT NEOPLASM OF THYROID GLAND: Primary | ICD-10-CM

## 2021-08-19 ENCOUNTER — CLINICAL SUPPORT (OUTPATIENT)
Dept: REHABILITATION | Facility: HOSPITAL | Age: 76
End: 2021-08-19
Payer: MEDICARE

## 2021-08-19 DIAGNOSIS — G89.29 CHRONIC CERVICAL PAIN: ICD-10-CM

## 2021-08-19 DIAGNOSIS — G89.29 BILATERAL CHRONIC KNEE PAIN: ICD-10-CM

## 2021-08-19 DIAGNOSIS — M25.561 BILATERAL CHRONIC KNEE PAIN: ICD-10-CM

## 2021-08-19 DIAGNOSIS — M54.50 LUMBAR PAIN: ICD-10-CM

## 2021-08-19 DIAGNOSIS — M25.562 BILATERAL CHRONIC KNEE PAIN: ICD-10-CM

## 2021-08-19 DIAGNOSIS — M54.2 CHRONIC CERVICAL PAIN: ICD-10-CM

## 2021-08-19 PROCEDURE — 97112 NEUROMUSCULAR REEDUCATION: CPT | Mod: PN

## 2021-08-19 PROCEDURE — 97110 THERAPEUTIC EXERCISES: CPT | Mod: PN

## 2021-08-20 DIAGNOSIS — M25.561 RIGHT KNEE PAIN, UNSPECIFIED CHRONICITY: Primary | ICD-10-CM

## 2021-08-23 ENCOUNTER — OFFICE VISIT (OUTPATIENT)
Dept: ORTHOPEDICS | Facility: CLINIC | Age: 76
End: 2021-08-23
Payer: MEDICARE

## 2021-08-23 ENCOUNTER — HOSPITAL ENCOUNTER (OUTPATIENT)
Dept: RADIOLOGY | Facility: HOSPITAL | Age: 76
Discharge: HOME | End: 2021-08-23
Attending: ORTHOPAEDIC SURGERY
Payer: MEDICARE

## 2021-08-23 VITALS — BODY MASS INDEX: 26.66 KG/M2 | HEIGHT: 65 IN | RESPIRATION RATE: 18 BRPM | WEIGHT: 160 LBS

## 2021-08-23 DIAGNOSIS — M79.671 RIGHT FOOT PAIN: ICD-10-CM

## 2021-08-23 DIAGNOSIS — M17.10 ARTHRITIS OF KNEE: ICD-10-CM

## 2021-08-23 DIAGNOSIS — M20.21 HALLUX RIGIDUS, RIGHT FOOT: ICD-10-CM

## 2021-08-23 DIAGNOSIS — M79.671 RIGHT FOOT PAIN: Primary | ICD-10-CM

## 2021-08-23 DIAGNOSIS — M25.561 RIGHT KNEE PAIN, UNSPECIFIED CHRONICITY: ICD-10-CM

## 2021-08-23 PROCEDURE — 73562 XR KNEE ORTHO RIGHT WITH FLEXION: ICD-10-PCS | Mod: 26,LT,, | Performed by: RADIOLOGY

## 2021-08-23 PROCEDURE — 73630 XR FOOT COMPLETE 3 VIEW RIGHT: ICD-10-PCS | Mod: 26,RT,, | Performed by: RADIOLOGY

## 2021-08-23 PROCEDURE — 99203 OFFICE O/P NEW LOW 30 MIN: CPT | Mod: 25,S$GLB,, | Performed by: ORTHOPAEDIC SURGERY

## 2021-08-23 PROCEDURE — 1125F PR PAIN SEVERITY QUANTIFIED, PAIN PRESENT: ICD-10-PCS | Mod: CPTII,S$GLB,, | Performed by: ORTHOPAEDIC SURGERY

## 2021-08-23 PROCEDURE — 1160F RVW MEDS BY RX/DR IN RCRD: CPT | Mod: CPTII,S$GLB,, | Performed by: ORTHOPAEDIC SURGERY

## 2021-08-23 PROCEDURE — 1159F MED LIST DOCD IN RCRD: CPT | Mod: CPTII,S$GLB,, | Performed by: ORTHOPAEDIC SURGERY

## 2021-08-23 PROCEDURE — 73630 X-RAY EXAM OF FOOT: CPT | Mod: TC,PN,RT

## 2021-08-23 PROCEDURE — 1101F PR PT FALLS ASSESS DOC 0-1 FALLS W/OUT INJ PAST YR: ICD-10-PCS | Mod: CPTII,S$GLB,, | Performed by: ORTHOPAEDIC SURGERY

## 2021-08-23 PROCEDURE — 1159F PR MEDICATION LIST DOCUMENTED IN MEDICAL RECORD: ICD-10-PCS | Mod: CPTII,S$GLB,, | Performed by: ORTHOPAEDIC SURGERY

## 2021-08-23 PROCEDURE — 20610 DRAIN/INJ JOINT/BURSA W/O US: CPT | Mod: RT,S$GLB,, | Performed by: ORTHOPAEDIC SURGERY

## 2021-08-23 PROCEDURE — 73564 X-RAY EXAM KNEE 4 OR MORE: CPT | Mod: TC,PN,RT

## 2021-08-23 PROCEDURE — 73564 X-RAY EXAM KNEE 4 OR MORE: CPT | Mod: 26,RT,, | Performed by: RADIOLOGY

## 2021-08-23 PROCEDURE — 73562 X-RAY EXAM OF KNEE 3: CPT | Mod: 26,LT,, | Performed by: RADIOLOGY

## 2021-08-23 PROCEDURE — 99999 PR PBB SHADOW E&M-EST. PATIENT-LVL IV: ICD-10-PCS | Mod: PBBFAC,,, | Performed by: ORTHOPAEDIC SURGERY

## 2021-08-23 PROCEDURE — 99999 PR PBB SHADOW E&M-EST. PATIENT-LVL IV: CPT | Mod: PBBFAC,,, | Performed by: ORTHOPAEDIC SURGERY

## 2021-08-23 PROCEDURE — 99203 PR OFFICE/OUTPT VISIT, NEW, LEVL III, 30-44 MIN: ICD-10-PCS | Mod: 25,S$GLB,, | Performed by: ORTHOPAEDIC SURGERY

## 2021-08-23 PROCEDURE — 73630 X-RAY EXAM OF FOOT: CPT | Mod: 26,RT,, | Performed by: RADIOLOGY

## 2021-08-23 PROCEDURE — 1160F PR REVIEW ALL MEDS BY PRESCRIBER/CLIN PHARMACIST DOCUMENTED: ICD-10-PCS | Mod: CPTII,S$GLB,, | Performed by: ORTHOPAEDIC SURGERY

## 2021-08-23 PROCEDURE — 1101F PT FALLS ASSESS-DOCD LE1/YR: CPT | Mod: CPTII,S$GLB,, | Performed by: ORTHOPAEDIC SURGERY

## 2021-08-23 PROCEDURE — 20610 LARGE JOINT ASPIRATION/INJECTION: R KNEE: ICD-10-PCS | Mod: RT,S$GLB,, | Performed by: ORTHOPAEDIC SURGERY

## 2021-08-23 PROCEDURE — 3288F PR FALLS RISK ASSESSMENT DOCUMENTED: ICD-10-PCS | Mod: CPTII,S$GLB,, | Performed by: ORTHOPAEDIC SURGERY

## 2021-08-23 PROCEDURE — 73564 XR KNEE ORTHO RIGHT WITH FLEXION: ICD-10-PCS | Mod: 26,RT,, | Performed by: RADIOLOGY

## 2021-08-23 PROCEDURE — 3288F FALL RISK ASSESSMENT DOCD: CPT | Mod: CPTII,S$GLB,, | Performed by: ORTHOPAEDIC SURGERY

## 2021-08-23 PROCEDURE — 1125F AMNT PAIN NOTED PAIN PRSNT: CPT | Mod: CPTII,S$GLB,, | Performed by: ORTHOPAEDIC SURGERY

## 2021-08-23 RX ORDER — TRIAMCINOLONE ACETONIDE 40 MG/ML
40 INJECTION, SUSPENSION INTRA-ARTICULAR; INTRAMUSCULAR
Status: DISCONTINUED | OUTPATIENT
Start: 2021-08-23 | End: 2021-08-23 | Stop reason: HOSPADM

## 2021-08-23 RX ADMIN — TRIAMCINOLONE ACETONIDE 40 MG: 40 INJECTION, SUSPENSION INTRA-ARTICULAR; INTRAMUSCULAR at 01:08

## 2021-08-24 ENCOUNTER — TELEPHONE (OUTPATIENT)
Dept: REHABILITATION | Facility: HOSPITAL | Age: 76
End: 2021-08-24

## 2021-08-24 ENCOUNTER — OFFICE VISIT (OUTPATIENT)
Dept: FAMILY MEDICINE | Facility: CLINIC | Age: 76
End: 2021-08-24
Attending: FAMILY MEDICINE
Payer: MEDICARE

## 2021-08-24 VITALS
WEIGHT: 156.94 LBS | SYSTOLIC BLOOD PRESSURE: 120 MMHG | HEIGHT: 65 IN | OXYGEN SATURATION: 99 % | BODY MASS INDEX: 26.15 KG/M2 | HEART RATE: 81 BPM | DIASTOLIC BLOOD PRESSURE: 50 MMHG | TEMPERATURE: 98 F

## 2021-08-24 DIAGNOSIS — R13.12 OROPHARYNGEAL DYSPHAGIA: Primary | ICD-10-CM

## 2021-08-24 DIAGNOSIS — M79.10 MYALGIA: ICD-10-CM

## 2021-08-24 DIAGNOSIS — C73 THYROID CANCER: ICD-10-CM

## 2021-08-24 DIAGNOSIS — N63.21 BREAST LUMP ON LEFT SIDE AT 2 O'CLOCK POSITION: ICD-10-CM

## 2021-08-24 DIAGNOSIS — E05.90 HYPERTHYROIDISM: ICD-10-CM

## 2021-08-24 PROCEDURE — 1159F PR MEDICATION LIST DOCUMENTED IN MEDICAL RECORD: ICD-10-PCS | Mod: CPTII,S$GLB,, | Performed by: FAMILY MEDICINE

## 2021-08-24 PROCEDURE — 99214 PR OFFICE/OUTPT VISIT, EST, LEVL IV, 30-39 MIN: ICD-10-PCS | Mod: S$GLB,,, | Performed by: FAMILY MEDICINE

## 2021-08-24 PROCEDURE — 3078F PR MOST RECENT DIASTOLIC BLOOD PRESSURE < 80 MM HG: ICD-10-PCS | Mod: CPTII,S$GLB,, | Performed by: FAMILY MEDICINE

## 2021-08-24 PROCEDURE — 1159F MED LIST DOCD IN RCRD: CPT | Mod: CPTII,S$GLB,, | Performed by: FAMILY MEDICINE

## 2021-08-24 PROCEDURE — 1160F RVW MEDS BY RX/DR IN RCRD: CPT | Mod: CPTII,S$GLB,, | Performed by: FAMILY MEDICINE

## 2021-08-24 PROCEDURE — 3288F FALL RISK ASSESSMENT DOCD: CPT | Mod: CPTII,S$GLB,, | Performed by: FAMILY MEDICINE

## 2021-08-24 PROCEDURE — 3074F SYST BP LT 130 MM HG: CPT | Mod: CPTII,S$GLB,, | Performed by: FAMILY MEDICINE

## 2021-08-24 PROCEDURE — 3078F DIAST BP <80 MM HG: CPT | Mod: CPTII,S$GLB,, | Performed by: FAMILY MEDICINE

## 2021-08-24 PROCEDURE — 99999 PR PBB SHADOW E&M-EST. PATIENT-LVL IV: ICD-10-PCS | Mod: PBBFAC,,, | Performed by: FAMILY MEDICINE

## 2021-08-24 PROCEDURE — 99999 PR PBB SHADOW E&M-EST. PATIENT-LVL IV: CPT | Mod: PBBFAC,,, | Performed by: FAMILY MEDICINE

## 2021-08-24 PROCEDURE — 1125F PR PAIN SEVERITY QUANTIFIED, PAIN PRESENT: ICD-10-PCS | Mod: CPTII,S$GLB,, | Performed by: FAMILY MEDICINE

## 2021-08-24 PROCEDURE — 1125F AMNT PAIN NOTED PAIN PRSNT: CPT | Mod: CPTII,S$GLB,, | Performed by: FAMILY MEDICINE

## 2021-08-24 PROCEDURE — 1160F PR REVIEW ALL MEDS BY PRESCRIBER/CLIN PHARMACIST DOCUMENTED: ICD-10-PCS | Mod: CPTII,S$GLB,, | Performed by: FAMILY MEDICINE

## 2021-08-24 PROCEDURE — 3074F PR MOST RECENT SYSTOLIC BLOOD PRESSURE < 130 MM HG: ICD-10-PCS | Mod: CPTII,S$GLB,, | Performed by: FAMILY MEDICINE

## 2021-08-24 PROCEDURE — 1101F PR PT FALLS ASSESS DOC 0-1 FALLS W/OUT INJ PAST YR: ICD-10-PCS | Mod: CPTII,S$GLB,, | Performed by: FAMILY MEDICINE

## 2021-08-24 PROCEDURE — 99214 OFFICE O/P EST MOD 30 MIN: CPT | Mod: S$GLB,,, | Performed by: FAMILY MEDICINE

## 2021-08-24 PROCEDURE — 1101F PT FALLS ASSESS-DOCD LE1/YR: CPT | Mod: CPTII,S$GLB,, | Performed by: FAMILY MEDICINE

## 2021-08-24 PROCEDURE — 3288F PR FALLS RISK ASSESSMENT DOCUMENTED: ICD-10-PCS | Mod: CPTII,S$GLB,, | Performed by: FAMILY MEDICINE

## 2021-08-25 ENCOUNTER — TELEPHONE (OUTPATIENT)
Dept: FAMILY MEDICINE | Facility: CLINIC | Age: 76
End: 2021-08-25

## 2021-09-02 ENCOUNTER — HOSPITAL ENCOUNTER (OUTPATIENT)
Dept: RADIOLOGY | Facility: HOSPITAL | Age: 76
Discharge: HOME | End: 2021-09-02
Attending: FAMILY MEDICINE
Payer: MEDICARE

## 2021-09-02 DIAGNOSIS — N63.21 BREAST LUMP ON LEFT SIDE AT 2 O'CLOCK POSITION: ICD-10-CM

## 2021-09-02 DIAGNOSIS — R92.8 ABNORMAL MAMMOGRAM OF LEFT BREAST: ICD-10-CM

## 2021-09-02 PROCEDURE — 76642 ULTRASOUND BREAST LIMITED: CPT | Mod: 26,LT,, | Performed by: RADIOLOGY

## 2021-09-02 PROCEDURE — 76642 US BREAST LEFT LIMITED: ICD-10-PCS | Mod: 26,LT,, | Performed by: RADIOLOGY

## 2021-09-02 PROCEDURE — 77061 BREAST TOMOSYNTHESIS UNI: CPT | Mod: TC,LT

## 2021-09-02 PROCEDURE — 77065 DX MAMMO INCL CAD UNI: CPT | Mod: 26,LT,, | Performed by: RADIOLOGY

## 2021-09-02 PROCEDURE — 77061 BREAST TOMOSYNTHESIS UNI: CPT | Mod: 26,LT,, | Performed by: RADIOLOGY

## 2021-09-02 PROCEDURE — 77065 MAMMO DIGITAL DIAGNOSTIC LEFT WITH TOMO: ICD-10-PCS | Mod: 26,LT,, | Performed by: RADIOLOGY

## 2021-09-02 PROCEDURE — 76642 ULTRASOUND BREAST LIMITED: CPT | Mod: TC,LT

## 2021-09-02 PROCEDURE — 77061 MAMMO DIGITAL DIAGNOSTIC LEFT WITH TOMO: ICD-10-PCS | Mod: 26,LT,, | Performed by: RADIOLOGY

## 2021-09-09 ENCOUNTER — TELEPHONE (OUTPATIENT)
Dept: OTOLARYNGOLOGY | Facility: CLINIC | Age: 76
End: 2021-09-09

## 2021-09-09 ENCOUNTER — TELEPHONE (OUTPATIENT)
Dept: FAMILY MEDICINE | Facility: CLINIC | Age: 76
End: 2021-09-09

## 2021-09-10 ENCOUNTER — LAB VISIT (OUTPATIENT)
Dept: PRIMARY CARE CLINIC | Facility: CLINIC | Age: 76
DRG: 626 | End: 2021-09-10
Payer: MEDICARE

## 2021-09-10 ENCOUNTER — TELEPHONE (OUTPATIENT)
Dept: OTOLARYNGOLOGY | Facility: CLINIC | Age: 76
End: 2021-09-10

## 2021-09-10 DIAGNOSIS — Z03.818 ENCOUNTER FOR OBSERVATION FOR SUSPECTED EXPOSURE TO OTHER BIOLOGICAL AGENTS RULED OUT: ICD-10-CM

## 2021-09-10 PROCEDURE — U0005 INFEC AGEN DETEC AMPLI PROBE: HCPCS | Performed by: OTOLARYNGOLOGY

## 2021-09-10 PROCEDURE — U0003 INFECTIOUS AGENT DETECTION BY NUCLEIC ACID (DNA OR RNA); SEVERE ACUTE RESPIRATORY SYNDROME CORONAVIRUS 2 (SARS-COV-2) (CORONAVIRUS DISEASE [COVID-19]), AMPLIFIED PROBE TECHNIQUE, MAKING USE OF HIGH THROUGHPUT TECHNOLOGIES AS DESCRIBED BY CMS-2020-01-R: HCPCS | Performed by: OTOLARYNGOLOGY

## 2021-09-11 LAB
SARS-COV-2 RNA RESP QL NAA+PROBE: NOT DETECTED
SARS-COV-2- CYCLE NUMBER: NORMAL

## 2021-09-13 ENCOUNTER — ANESTHESIA EVENT (OUTPATIENT)
Dept: SURGERY | Facility: HOSPITAL | Age: 76
DRG: 626 | End: 2021-09-13
Payer: MEDICARE

## 2021-09-13 ENCOUNTER — HOSPITAL ENCOUNTER (INPATIENT)
Facility: HOSPITAL | Age: 76
LOS: 1 days | Discharge: HOME | DRG: 626 | End: 2021-09-14
Attending: OTOLARYNGOLOGY | Admitting: OTOLARYNGOLOGY
Payer: MEDICARE

## 2021-09-13 ENCOUNTER — ANESTHESIA (OUTPATIENT)
Dept: SURGERY | Facility: HOSPITAL | Age: 76
DRG: 626 | End: 2021-09-13
Payer: MEDICARE

## 2021-09-13 DIAGNOSIS — C73 PAPILLARY THYROID CARCINOMA: ICD-10-CM

## 2021-09-13 DIAGNOSIS — C73 THYROID CANCER: ICD-10-CM

## 2021-09-13 LAB
CA-I BLDV-SCNC: 0.97 MMOL/L (ref 1.06–1.42)
CA-I BLDV-SCNC: 1.01 MMOL/L (ref 1.06–1.42)
ESTIMATED AVG GLUCOSE: 120 MG/DL (ref 68–131)
ESTIMATED AVG GLUCOSE: 120 MG/DL (ref 68–131)
HBA1C MFR BLD: 5.8 % (ref 4–5.6)
HBA1C MFR BLD: 5.8 % (ref 4–5.6)
POCT GLUCOSE: 113 MG/DL (ref 70–110)
POCT GLUCOSE: 154 MG/DL (ref 70–110)
POCT GLUCOSE: 191 MG/DL (ref 70–110)
POCT GLUCOSE: 198 MG/DL (ref 70–110)
PTH-INTACT SERPL-MCNC: 36.4 PG/ML (ref 9–77)
PTH-INTACT SERPL-MCNC: 36.7 PG/ML (ref 9–77)
PTH-INTACT SERPL-MCNC: 62.4 PG/ML (ref 9–77)
PTH-INTACT SERPL-MCNC: 73.2 PG/ML (ref 9–77)

## 2021-09-13 PROCEDURE — 27201423 OPTIME MED/SURG SUP & DEVICES STERILE SUPPLY: Performed by: OTOLARYNGOLOGY

## 2021-09-13 PROCEDURE — 88302 TISSUE EXAM BY PATHOLOGIST: CPT | Mod: 26,,, | Performed by: PATHOLOGY

## 2021-09-13 PROCEDURE — 25000003 PHARM REV CODE 250: Performed by: OTOLARYNGOLOGY

## 2021-09-13 PROCEDURE — 71000015 HC POSTOP RECOV 1ST HR: Performed by: OTOLARYNGOLOGY

## 2021-09-13 PROCEDURE — 60260 PR THYROIDECTOMY POST PREV THYR SURG: ICD-10-PCS | Mod: 50,51,, | Performed by: OTOLARYNGOLOGY

## 2021-09-13 PROCEDURE — 88305 TISSUE EXAM BY PATHOLOGIST: CPT | Performed by: PATHOLOGY

## 2021-09-13 PROCEDURE — 36500 INSERTION OF CATHETER VEIN: CPT | Mod: 51,,, | Performed by: OTOLARYNGOLOGY

## 2021-09-13 PROCEDURE — D9220A PRA ANESTHESIA: ICD-10-PCS | Mod: ANES,,, | Performed by: ANESTHESIOLOGY

## 2021-09-13 PROCEDURE — 25000003 PHARM REV CODE 250: Performed by: NURSE ANESTHETIST, CERTIFIED REGISTERED

## 2021-09-13 PROCEDURE — 38724 PR REMOVAL NODES, NECK,CERV MOD RAD: ICD-10-PCS | Mod: LT,,, | Performed by: OTOLARYNGOLOGY

## 2021-09-13 PROCEDURE — 63600175 PHARM REV CODE 636 W HCPCS: Performed by: NURSE ANESTHETIST, CERTIFIED REGISTERED

## 2021-09-13 PROCEDURE — 36000707: Performed by: OTOLARYNGOLOGY

## 2021-09-13 PROCEDURE — 71000016 HC POSTOP RECOV ADDL HR: Performed by: OTOLARYNGOLOGY

## 2021-09-13 PROCEDURE — 88307 PR  SURG PATH,LEVEL V: ICD-10-PCS | Mod: 26,,, | Performed by: PATHOLOGY

## 2021-09-13 PROCEDURE — D9220A PRA ANESTHESIA: Mod: CRNA,,, | Performed by: NURSE ANESTHETIST, CERTIFIED REGISTERED

## 2021-09-13 PROCEDURE — 83970 ASSAY OF PARATHORMONE: CPT | Mod: 91 | Performed by: STUDENT IN AN ORGANIZED HEALTH CARE EDUCATION/TRAINING PROGRAM

## 2021-09-13 PROCEDURE — 88305 TISSUE EXAM BY PATHOLOGIST: CPT | Mod: 26,,, | Performed by: PATHOLOGY

## 2021-09-13 PROCEDURE — 20600001 HC STEP DOWN PRIVATE ROOM

## 2021-09-13 PROCEDURE — 94761 N-INVAS EAR/PLS OXIMETRY MLT: CPT

## 2021-09-13 PROCEDURE — 83970 ASSAY OF PARATHORMONE: CPT | Mod: 91 | Performed by: OTOLARYNGOLOGY

## 2021-09-13 PROCEDURE — 63600175 PHARM REV CODE 636 W HCPCS: Performed by: OTOLARYNGOLOGY

## 2021-09-13 PROCEDURE — 25000003 PHARM REV CODE 250: Performed by: STUDENT IN AN ORGANIZED HEALTH CARE EDUCATION/TRAINING PROGRAM

## 2021-09-13 PROCEDURE — 82962 GLUCOSE BLOOD TEST: CPT | Performed by: OTOLARYNGOLOGY

## 2021-09-13 PROCEDURE — 36415 COLL VENOUS BLD VENIPUNCTURE: CPT | Performed by: OTOLARYNGOLOGY

## 2021-09-13 PROCEDURE — 88302 TISSUE EXAM BY PATHOLOGIST: CPT | Performed by: PATHOLOGY

## 2021-09-13 PROCEDURE — 88302 PR  SURG PATH,LEVEL II: ICD-10-PCS | Mod: 26,,, | Performed by: PATHOLOGY

## 2021-09-13 PROCEDURE — 71000033 HC RECOVERY, INTIAL HOUR: Performed by: OTOLARYNGOLOGY

## 2021-09-13 PROCEDURE — 38724 REMOVAL OF LYMPH NODES NECK: CPT | Mod: LT,,, | Performed by: OTOLARYNGOLOGY

## 2021-09-13 PROCEDURE — D9220A PRA ANESTHESIA: ICD-10-PCS | Mod: CRNA,,, | Performed by: NURSE ANESTHETIST, CERTIFIED REGISTERED

## 2021-09-13 PROCEDURE — 88307 TISSUE EXAM BY PATHOLOGIST: CPT | Mod: 26,,, | Performed by: PATHOLOGY

## 2021-09-13 PROCEDURE — 82330 ASSAY OF CALCIUM: CPT | Performed by: STUDENT IN AN ORGANIZED HEALTH CARE EDUCATION/TRAINING PROGRAM

## 2021-09-13 PROCEDURE — 88305 TISSUE EXAM BY PATHOLOGIST: ICD-10-PCS | Mod: 26,,, | Performed by: PATHOLOGY

## 2021-09-13 PROCEDURE — 60260 REPEAT THYROID SURGERY: CPT | Mod: 50,51,, | Performed by: OTOLARYNGOLOGY

## 2021-09-13 PROCEDURE — 36500 PR VENOUS SAMPLING BY CATHETER, W/ORGAN BLOOD SAMPLE: ICD-10-PCS | Mod: 51,,, | Performed by: OTOLARYNGOLOGY

## 2021-09-13 PROCEDURE — D9220A PRA ANESTHESIA: Mod: ANES,,, | Performed by: ANESTHESIOLOGY

## 2021-09-13 PROCEDURE — 88307 TISSUE EXAM BY PATHOLOGIST: CPT | Performed by: PATHOLOGY

## 2021-09-13 PROCEDURE — 83036 HEMOGLOBIN GLYCOSYLATED A1C: CPT | Performed by: STUDENT IN AN ORGANIZED HEALTH CARE EDUCATION/TRAINING PROGRAM

## 2021-09-13 PROCEDURE — 37000008 HC ANESTHESIA 1ST 15 MINUTES: Performed by: OTOLARYNGOLOGY

## 2021-09-13 PROCEDURE — 36415 COLL VENOUS BLD VENIPUNCTURE: CPT | Performed by: STUDENT IN AN ORGANIZED HEALTH CARE EDUCATION/TRAINING PROGRAM

## 2021-09-13 PROCEDURE — 37000009 HC ANESTHESIA EA ADD 15 MINS: Performed by: OTOLARYNGOLOGY

## 2021-09-13 PROCEDURE — 36000706: Performed by: OTOLARYNGOLOGY

## 2021-09-13 RX ORDER — MIDAZOLAM HYDROCHLORIDE 1 MG/ML
INJECTION INTRAMUSCULAR; INTRAVENOUS
Status: DISCONTINUED | OUTPATIENT
Start: 2021-09-13 | End: 2021-09-13

## 2021-09-13 RX ORDER — INSULIN ASPART 100 [IU]/ML
0-5 INJECTION, SOLUTION INTRAVENOUS; SUBCUTANEOUS
Status: DISCONTINUED | OUTPATIENT
Start: 2021-09-13 | End: 2021-09-14 | Stop reason: HOSPADM

## 2021-09-13 RX ORDER — AMLODIPINE BESYLATE 2.5 MG/1
2.5 TABLET ORAL DAILY
Status: DISCONTINUED | OUTPATIENT
Start: 2021-09-14 | End: 2021-09-14 | Stop reason: HOSPADM

## 2021-09-13 RX ORDER — CLONAZEPAM 0.5 MG/1
0.5 TABLET ORAL 2 TIMES DAILY PRN
Status: DISCONTINUED | OUTPATIENT
Start: 2021-09-13 | End: 2021-09-14 | Stop reason: HOSPADM

## 2021-09-13 RX ORDER — SODIUM CHLORIDE 0.9 % (FLUSH) 0.9 %
10 SYRINGE (ML) INJECTION
Status: DISCONTINUED | OUTPATIENT
Start: 2021-09-13 | End: 2021-09-14 | Stop reason: HOSPADM

## 2021-09-13 RX ORDER — PHENYLEPHRINE HYDROCHLORIDE 10 MG/ML
INJECTION INTRAVENOUS CONTINUOUS PRN
Status: DISCONTINUED | OUTPATIENT
Start: 2021-09-13 | End: 2021-09-13

## 2021-09-13 RX ORDER — ENOXAPARIN SODIUM 100 MG/ML
40 INJECTION SUBCUTANEOUS EVERY 24 HOURS
Status: DISCONTINUED | OUTPATIENT
Start: 2021-09-14 | End: 2021-09-14 | Stop reason: HOSPADM

## 2021-09-13 RX ORDER — PHENYLEPHRINE HCL IN 0.9% NACL 1 MG/10 ML
SYRINGE (ML) INTRAVENOUS
Status: DISCONTINUED | OUTPATIENT
Start: 2021-09-13 | End: 2021-09-13

## 2021-09-13 RX ORDER — SODIUM CHLORIDE 9 MG/ML
INJECTION, SOLUTION INTRAVENOUS CONTINUOUS PRN
Status: DISCONTINUED | OUTPATIENT
Start: 2021-09-13 | End: 2021-09-13

## 2021-09-13 RX ORDER — CALCIUM CARBONATE 200(500)MG
1000 TABLET,CHEWABLE ORAL EVERY 8 HOURS
Status: DISCONTINUED | OUTPATIENT
Start: 2021-09-13 | End: 2021-09-14 | Stop reason: HOSPADM

## 2021-09-13 RX ORDER — GLUCAGON 1 MG
1 KIT INJECTION
Status: DISCONTINUED | OUTPATIENT
Start: 2021-09-13 | End: 2021-09-14 | Stop reason: HOSPADM

## 2021-09-13 RX ORDER — LOSARTAN POTASSIUM 50 MG/1
100 TABLET ORAL DAILY
Status: DISCONTINUED | OUTPATIENT
Start: 2021-09-13 | End: 2021-09-14 | Stop reason: HOSPADM

## 2021-09-13 RX ORDER — MORPHINE SULFATE 2 MG/ML
2 INJECTION, SOLUTION INTRAMUSCULAR; INTRAVENOUS EVERY 4 HOURS PRN
Status: DISCONTINUED | OUTPATIENT
Start: 2021-09-13 | End: 2021-09-14 | Stop reason: HOSPADM

## 2021-09-13 RX ORDER — ONDANSETRON 2 MG/ML
INJECTION INTRAMUSCULAR; INTRAVENOUS
Status: DISCONTINUED | OUTPATIENT
Start: 2021-09-13 | End: 2021-09-13

## 2021-09-13 RX ORDER — CEFAZOLIN SODIUM 1 G/3ML
2 INJECTION, POWDER, FOR SOLUTION INTRAMUSCULAR; INTRAVENOUS
Status: COMPLETED | OUTPATIENT
Start: 2021-09-13 | End: 2021-09-13

## 2021-09-13 RX ORDER — HYDROCODONE BITARTRATE AND ACETAMINOPHEN 5; 325 MG/1; MG/1
1 TABLET ORAL EVERY 6 HOURS PRN
Status: DISCONTINUED | OUTPATIENT
Start: 2021-09-13 | End: 2021-09-14 | Stop reason: HOSPADM

## 2021-09-13 RX ORDER — IBUPROFEN 200 MG
16 TABLET ORAL
Status: DISCONTINUED | OUTPATIENT
Start: 2021-09-13 | End: 2021-09-14 | Stop reason: HOSPADM

## 2021-09-13 RX ORDER — CEPHALEXIN 500 MG/1
500 CAPSULE ORAL EVERY 12 HOURS
Status: DISCONTINUED | OUTPATIENT
Start: 2021-09-13 | End: 2021-09-14 | Stop reason: HOSPADM

## 2021-09-13 RX ORDER — HYDROMORPHONE HYDROCHLORIDE 2 MG/ML
INJECTION, SOLUTION INTRAMUSCULAR; INTRAVENOUS; SUBCUTANEOUS
Status: DISCONTINUED | OUTPATIENT
Start: 2021-09-13 | End: 2021-09-13

## 2021-09-13 RX ORDER — ROCURONIUM BROMIDE 10 MG/ML
INJECTION, SOLUTION INTRAVENOUS
Status: DISCONTINUED | OUTPATIENT
Start: 2021-09-13 | End: 2021-09-13

## 2021-09-13 RX ORDER — IBUPROFEN 200 MG
24 TABLET ORAL
Status: DISCONTINUED | OUTPATIENT
Start: 2021-09-13 | End: 2021-09-14 | Stop reason: HOSPADM

## 2021-09-13 RX ORDER — ONDANSETRON 2 MG/ML
4 INJECTION INTRAMUSCULAR; INTRAVENOUS EVERY 6 HOURS PRN
Status: DISCONTINUED | OUTPATIENT
Start: 2021-09-13 | End: 2021-09-14 | Stop reason: HOSPADM

## 2021-09-13 RX ORDER — DEXAMETHASONE SODIUM PHOSPHATE 4 MG/ML
INJECTION, SOLUTION INTRA-ARTICULAR; INTRALESIONAL; INTRAMUSCULAR; INTRAVENOUS; SOFT TISSUE
Status: DISCONTINUED | OUTPATIENT
Start: 2021-09-13 | End: 2021-09-13

## 2021-09-13 RX ORDER — PROPOFOL 10 MG/ML
VIAL (ML) INTRAVENOUS
Status: DISCONTINUED | OUTPATIENT
Start: 2021-09-13 | End: 2021-09-13

## 2021-09-13 RX ORDER — LEVOTHYROXINE SODIUM 112 UG/1
112 TABLET ORAL
Status: DISCONTINUED | OUTPATIENT
Start: 2021-09-14 | End: 2021-09-14 | Stop reason: HOSPADM

## 2021-09-13 RX ORDER — PROCHLORPERAZINE EDISYLATE 5 MG/ML
5 INJECTION INTRAMUSCULAR; INTRAVENOUS EVERY 30 MIN PRN
Status: DISCONTINUED | OUTPATIENT
Start: 2021-09-13 | End: 2021-09-13 | Stop reason: HOSPADM

## 2021-09-13 RX ORDER — LIDOCAINE HYDROCHLORIDE 10 MG/ML
1 INJECTION, SOLUTION EPIDURAL; INFILTRATION; INTRACAUDAL; PERINEURAL ONCE
Status: DISCONTINUED | OUTPATIENT
Start: 2021-09-13 | End: 2021-09-14 | Stop reason: HOSPADM

## 2021-09-13 RX ORDER — LIDOCAINE HYDROCHLORIDE AND EPINEPHRINE 10; 10 MG/ML; UG/ML
INJECTION, SOLUTION INFILTRATION; PERINEURAL
Status: DISCONTINUED | OUTPATIENT
Start: 2021-09-13 | End: 2021-09-13 | Stop reason: HOSPADM

## 2021-09-13 RX ORDER — ACETAMINOPHEN 10 MG/ML
INJECTION, SOLUTION INTRAVENOUS
Status: DISCONTINUED | OUTPATIENT
Start: 2021-09-13 | End: 2021-09-13

## 2021-09-13 RX ORDER — FENTANYL CITRATE 50 UG/ML
INJECTION, SOLUTION INTRAMUSCULAR; INTRAVENOUS
Status: DISCONTINUED | OUTPATIENT
Start: 2021-09-13 | End: 2021-09-13

## 2021-09-13 RX ORDER — SUCCINYLCHOLINE CHLORIDE 20 MG/ML
INJECTION INTRAMUSCULAR; INTRAVENOUS
Status: DISCONTINUED | OUTPATIENT
Start: 2021-09-13 | End: 2021-09-13

## 2021-09-13 RX ORDER — DEXMEDETOMIDINE HYDROCHLORIDE 100 UG/ML
INJECTION, SOLUTION INTRAVENOUS
Status: DISCONTINUED | OUTPATIENT
Start: 2021-09-13 | End: 2021-09-13

## 2021-09-13 RX ORDER — KETAMINE HCL IN 0.9 % NACL 50 MG/5 ML
SYRINGE (ML) INTRAVENOUS
Status: DISCONTINUED | OUTPATIENT
Start: 2021-09-13 | End: 2021-09-13

## 2021-09-13 RX ORDER — HYDROCODONE BITARTRATE AND ACETAMINOPHEN 5; 325 MG/1; MG/1
2 TABLET ORAL EVERY 6 HOURS PRN
Status: DISCONTINUED | OUTPATIENT
Start: 2021-09-13 | End: 2021-09-14 | Stop reason: HOSPADM

## 2021-09-13 RX ORDER — CHLORHEXIDINE GLUCONATE ORAL RINSE 1.2 MG/ML
15 SOLUTION DENTAL NIGHTLY
Status: DISCONTINUED | OUTPATIENT
Start: 2021-09-13 | End: 2021-09-14 | Stop reason: HOSPADM

## 2021-09-13 RX ORDER — HYDROMORPHONE HYDROCHLORIDE 1 MG/ML
0.2 INJECTION, SOLUTION INTRAMUSCULAR; INTRAVENOUS; SUBCUTANEOUS EVERY 5 MIN PRN
Status: DISCONTINUED | OUTPATIENT
Start: 2021-09-13 | End: 2021-09-13 | Stop reason: HOSPADM

## 2021-09-13 RX ORDER — SODIUM CHLORIDE 0.9 % (FLUSH) 0.9 %
10 SYRINGE (ML) INJECTION
Status: DISCONTINUED | OUTPATIENT
Start: 2021-09-13 | End: 2021-09-13

## 2021-09-13 RX ORDER — LIDOCAINE HYDROCHLORIDE 20 MG/ML
INJECTION, SOLUTION EPIDURAL; INFILTRATION; INTRACAUDAL; PERINEURAL
Status: DISCONTINUED | OUTPATIENT
Start: 2021-09-13 | End: 2021-09-13

## 2021-09-13 RX ORDER — ACETAMINOPHEN 325 MG/1
650 TABLET ORAL EVERY 8 HOURS PRN
Status: DISCONTINUED | OUTPATIENT
Start: 2021-09-13 | End: 2021-09-14 | Stop reason: HOSPADM

## 2021-09-13 RX ORDER — LIDOCAINE HYDROCHLORIDE 10 MG/ML
1 INJECTION, SOLUTION EPIDURAL; INFILTRATION; INTRACAUDAL; PERINEURAL ONCE
Status: DISCONTINUED | OUTPATIENT
Start: 2021-09-13 | End: 2021-09-13

## 2021-09-13 RX ORDER — TALC
6 POWDER (GRAM) TOPICAL NIGHTLY PRN
Status: DISCONTINUED | OUTPATIENT
Start: 2021-09-13 | End: 2021-09-14 | Stop reason: HOSPADM

## 2021-09-13 RX ADMIN — Medication 20 MG: at 07:09

## 2021-09-13 RX ADMIN — Medication 50 MCG: at 10:09

## 2021-09-13 RX ADMIN — DEXMEDETOMIDINE HYDROCHLORIDE 8 MCG: 100 INJECTION, SOLUTION INTRAVENOUS at 08:09

## 2021-09-13 RX ADMIN — MIDAZOLAM 2 MG: 1 INJECTION INTRAMUSCULAR; INTRAVENOUS at 07:09

## 2021-09-13 RX ADMIN — Medication 100 MCG: at 09:09

## 2021-09-13 RX ADMIN — HYDROMORPHONE HYDROCHLORIDE 0.4 MG: 2 INJECTION INTRAMUSCULAR; INTRAVENOUS; SUBCUTANEOUS at 10:09

## 2021-09-13 RX ADMIN — CEFAZOLIN 2 G: 330 INJECTION, POWDER, FOR SOLUTION INTRAMUSCULAR; INTRAVENOUS at 07:09

## 2021-09-13 RX ADMIN — ACETAMINOPHEN 1000 MG: 10 INJECTION INTRAVENOUS at 08:09

## 2021-09-13 RX ADMIN — CALCIUM CARBONATE (ANTACID) CHEW TAB 500 MG 1000 MG: 500 CHEW TAB at 11:09

## 2021-09-13 RX ADMIN — HYDROMORPHONE HYDROCHLORIDE 0.4 MG: 2 INJECTION INTRAMUSCULAR; INTRAVENOUS; SUBCUTANEOUS at 01:09

## 2021-09-13 RX ADMIN — Medication 50 MCG: at 11:09

## 2021-09-13 RX ADMIN — CHLORHEXIDINE GLUCONATE 0.12% ORAL RINSE 15 ML: 1.2 LIQUID ORAL at 09:09

## 2021-09-13 RX ADMIN — SODIUM CHLORIDE: 0.9 INJECTION, SOLUTION INTRAVENOUS at 07:09

## 2021-09-13 RX ADMIN — Medication 10 MG: at 09:09

## 2021-09-13 RX ADMIN — DEXAMETHASONE SODIUM PHOSPHATE 8 MG: 4 INJECTION INTRA-ARTICULAR; INTRALESIONAL; INTRAMUSCULAR; INTRAVENOUS; SOFT TISSUE at 07:09

## 2021-09-13 RX ADMIN — SUCCINYLCHOLINE CHLORIDE 160 MG: 20 INJECTION, SOLUTION INTRAMUSCULAR; INTRAVENOUS; PARENTERAL at 07:09

## 2021-09-13 RX ADMIN — HYDROMORPHONE HYDROCHLORIDE 0.2 MG: 2 INJECTION INTRAMUSCULAR; INTRAVENOUS; SUBCUTANEOUS at 11:09

## 2021-09-13 RX ADMIN — CEFAZOLIN 2 G: 330 INJECTION, POWDER, FOR SOLUTION INTRAMUSCULAR; INTRAVENOUS at 11:09

## 2021-09-13 RX ADMIN — CEPHALEXIN 500 MG: 500 CAPSULE ORAL at 09:09

## 2021-09-13 RX ADMIN — Medication 10 MG: at 07:09

## 2021-09-13 RX ADMIN — LIDOCAINE HYDROCHLORIDE 80 MG: 20 INJECTION, SOLUTION EPIDURAL; INFILTRATION; INTRACAUDAL at 07:09

## 2021-09-13 RX ADMIN — ACETAMINOPHEN 650 MG: 325 TABLET ORAL at 09:09

## 2021-09-13 RX ADMIN — DEXMEDETOMIDINE HYDROCHLORIDE 8 MCG: 100 INJECTION, SOLUTION INTRAVENOUS at 07:09

## 2021-09-13 RX ADMIN — ONDANSETRON 4 MG: 2 INJECTION INTRAMUSCULAR; INTRAVENOUS at 01:09

## 2021-09-13 RX ADMIN — LOSARTAN POTASSIUM 100 MG: 50 TABLET, FILM COATED ORAL at 02:09

## 2021-09-13 RX ADMIN — Medication 100 MCG: at 07:09

## 2021-09-13 RX ADMIN — PHENYLEPHRINE HYDROCHLORIDE 0.3 MCG/KG/MIN: 10 INJECTION INTRAVENOUS at 08:09

## 2021-09-13 RX ADMIN — Medication 100 MCG: at 12:09

## 2021-09-13 RX ADMIN — DEXMEDETOMIDINE HYDROCHLORIDE 8 MCG: 100 INJECTION, SOLUTION INTRAVENOUS at 09:09

## 2021-09-13 RX ADMIN — SODIUM CHLORIDE, SODIUM GLUCONATE, SODIUM ACETATE, POTASSIUM CHLORIDE, MAGNESIUM CHLORIDE, SODIUM PHOSPHATE, DIBASIC, AND POTASSIUM PHOSPHATE: .53; .5; .37; .037; .03; .012; .00082 INJECTION, SOLUTION INTRAVENOUS at 07:09

## 2021-09-13 RX ADMIN — Medication 100 MCG: at 08:09

## 2021-09-13 RX ADMIN — FENTANYL CITRATE 100 MCG: 50 INJECTION INTRAMUSCULAR; INTRAVENOUS at 07:09

## 2021-09-13 RX ADMIN — Medication 10 MG: at 08:09

## 2021-09-13 RX ADMIN — PROPOFOL 160 MG: 10 INJECTION, EMULSION INTRAVENOUS at 07:09

## 2021-09-13 RX ADMIN — ROCURONIUM BROMIDE 5 MG: 10 INJECTION INTRAVENOUS at 07:09

## 2021-09-14 VITALS
DIASTOLIC BLOOD PRESSURE: 63 MMHG | OXYGEN SATURATION: 95 % | TEMPERATURE: 98 F | RESPIRATION RATE: 18 BRPM | BODY MASS INDEX: 26.66 KG/M2 | SYSTOLIC BLOOD PRESSURE: 142 MMHG | HEART RATE: 90 BPM | HEIGHT: 65 IN | WEIGHT: 160 LBS

## 2021-09-14 LAB
CA-I BLDV-SCNC: 0.99 MMOL/L (ref 1.06–1.42)
CA-I BLDV-SCNC: 1 MMOL/L (ref 1.06–1.42)
POCT GLUCOSE: 143 MG/DL (ref 70–110)

## 2021-09-14 PROCEDURE — 25000003 PHARM REV CODE 250: Performed by: STUDENT IN AN ORGANIZED HEALTH CARE EDUCATION/TRAINING PROGRAM

## 2021-09-14 PROCEDURE — 97161 PT EVAL LOW COMPLEX 20 MIN: CPT

## 2021-09-14 PROCEDURE — 36415 COLL VENOUS BLD VENIPUNCTURE: CPT | Performed by: STUDENT IN AN ORGANIZED HEALTH CARE EDUCATION/TRAINING PROGRAM

## 2021-09-14 PROCEDURE — 82330 ASSAY OF CALCIUM: CPT | Mod: 91 | Performed by: STUDENT IN AN ORGANIZED HEALTH CARE EDUCATION/TRAINING PROGRAM

## 2021-09-14 RX ORDER — HYDROCODONE BITARTRATE AND ACETAMINOPHEN 5; 325 MG/1; MG/1
1 TABLET ORAL EVERY 6 HOURS PRN
Qty: 20 TABLET | Refills: 0 | Status: SHIPPED | OUTPATIENT
Start: 2021-09-14 | End: 2021-12-28 | Stop reason: CLARIF

## 2021-09-14 RX ORDER — CEPHALEXIN 500 MG/1
500 CAPSULE ORAL EVERY 12 HOURS
Qty: 14 CAPSULE | Refills: 0 | Status: SHIPPED | OUTPATIENT
Start: 2021-09-14 | End: 2021-09-21

## 2021-09-14 RX ORDER — CALCIUM CARBONATE 200(500)MG
2 TABLET,CHEWABLE ORAL 3 TIMES DAILY
Qty: 100 TABLET | Refills: 0 | Status: SHIPPED | OUTPATIENT
Start: 2021-09-14 | End: 2023-12-29 | Stop reason: ALTCHOICE

## 2021-09-14 RX ORDER — ONDANSETRON 4 MG/1
4 TABLET, ORALLY DISINTEGRATING ORAL EVERY 6 HOURS PRN
Qty: 15 TABLET | Refills: 0 | Status: SHIPPED | OUTPATIENT
Start: 2021-09-14 | End: 2021-12-28 | Stop reason: CLARIF

## 2021-09-14 RX ADMIN — CALCIUM CARBONATE (ANTACID) CHEW TAB 500 MG 1000 MG: 500 CHEW TAB at 07:09

## 2021-09-14 RX ADMIN — AMLODIPINE BESYLATE 2.5 MG: 2.5 TABLET ORAL at 08:09

## 2021-09-14 RX ADMIN — LEVOTHYROXINE SODIUM 112 MCG: 112 TABLET ORAL at 07:09

## 2021-09-14 RX ADMIN — LOSARTAN POTASSIUM 100 MG: 50 TABLET, FILM COATED ORAL at 08:09

## 2021-09-14 RX ADMIN — CEPHALEXIN 500 MG: 500 CAPSULE ORAL at 08:09

## 2021-09-14 RX ADMIN — HYDROCODONE BITARTRATE AND ACETAMINOPHEN 1 TABLET: 5; 325 TABLET ORAL at 02:09

## 2021-09-15 ENCOUNTER — TELEPHONE (OUTPATIENT)
Dept: FAMILY MEDICINE | Facility: CLINIC | Age: 76
End: 2021-09-15

## 2021-09-16 DIAGNOSIS — E83.51 HYPOCALCEMIA: Primary | ICD-10-CM

## 2021-09-16 DIAGNOSIS — Z01.818 PRE-OP TESTING: ICD-10-CM

## 2021-09-17 ENCOUNTER — LAB VISIT (OUTPATIENT)
Dept: LAB | Facility: HOSPITAL | Age: 76
End: 2021-09-17
Payer: MEDICARE

## 2021-09-17 ENCOUNTER — OFFICE VISIT (OUTPATIENT)
Dept: OTOLARYNGOLOGY | Facility: CLINIC | Age: 76
End: 2021-09-17
Payer: MEDICARE

## 2021-09-17 VITALS
DIASTOLIC BLOOD PRESSURE: 73 MMHG | HEART RATE: 98 BPM | WEIGHT: 155.63 LBS | BODY MASS INDEX: 25.9 KG/M2 | SYSTOLIC BLOOD PRESSURE: 129 MMHG

## 2021-09-17 DIAGNOSIS — E83.51 HYPOCALCEMIA: ICD-10-CM

## 2021-09-17 DIAGNOSIS — C73 THYROID CANCER: Primary | ICD-10-CM

## 2021-09-17 LAB
CA-I BLDV-SCNC: 1.21 MMOL/L (ref 1.06–1.42)
CALCIUM SERPL-MCNC: 10 MG/DL (ref 8.7–10.5)
PTH-INTACT SERPL-MCNC: 15.6 PG/ML (ref 9–77)

## 2021-09-17 PROCEDURE — 3288F PR FALLS RISK ASSESSMENT DOCUMENTED: ICD-10-PCS | Mod: CPTII,S$GLB,, | Performed by: NURSE PRACTITIONER

## 2021-09-17 PROCEDURE — 1101F PT FALLS ASSESS-DOCD LE1/YR: CPT | Mod: CPTII,S$GLB,, | Performed by: NURSE PRACTITIONER

## 2021-09-17 PROCEDURE — 1126F PR PAIN SEVERITY QUANTIFIED, NO PAIN PRESENT: ICD-10-PCS | Mod: CPTII,S$GLB,, | Performed by: NURSE PRACTITIONER

## 2021-09-17 PROCEDURE — 99999 PR PBB SHADOW E&M-EST. PATIENT-LVL IV: CPT | Mod: PBBFAC,,, | Performed by: NURSE PRACTITIONER

## 2021-09-17 PROCEDURE — 3074F SYST BP LT 130 MM HG: CPT | Mod: CPTII,S$GLB,, | Performed by: NURSE PRACTITIONER

## 2021-09-17 PROCEDURE — 1160F PR REVIEW ALL MEDS BY PRESCRIBER/CLIN PHARMACIST DOCUMENTED: ICD-10-PCS | Mod: CPTII,S$GLB,, | Performed by: NURSE PRACTITIONER

## 2021-09-17 PROCEDURE — 1101F PR PT FALLS ASSESS DOC 0-1 FALLS W/OUT INJ PAST YR: ICD-10-PCS | Mod: CPTII,S$GLB,, | Performed by: NURSE PRACTITIONER

## 2021-09-17 PROCEDURE — 1159F PR MEDICATION LIST DOCUMENTED IN MEDICAL RECORD: ICD-10-PCS | Mod: CPTII,S$GLB,, | Performed by: NURSE PRACTITIONER

## 2021-09-17 PROCEDURE — 99024 PR POST-OP FOLLOW-UP VISIT: ICD-10-PCS | Mod: S$GLB,,, | Performed by: NURSE PRACTITIONER

## 2021-09-17 PROCEDURE — 1159F MED LIST DOCD IN RCRD: CPT | Mod: CPTII,S$GLB,, | Performed by: NURSE PRACTITIONER

## 2021-09-17 PROCEDURE — 99999 PR PBB SHADOW E&M-EST. PATIENT-LVL IV: ICD-10-PCS | Mod: PBBFAC,,, | Performed by: NURSE PRACTITIONER

## 2021-09-17 PROCEDURE — 3078F PR MOST RECENT DIASTOLIC BLOOD PRESSURE < 80 MM HG: ICD-10-PCS | Mod: CPTII,S$GLB,, | Performed by: NURSE PRACTITIONER

## 2021-09-17 PROCEDURE — 3078F DIAST BP <80 MM HG: CPT | Mod: CPTII,S$GLB,, | Performed by: NURSE PRACTITIONER

## 2021-09-17 PROCEDURE — 82310 ASSAY OF CALCIUM: CPT | Performed by: NURSE PRACTITIONER

## 2021-09-17 PROCEDURE — 82330 ASSAY OF CALCIUM: CPT | Performed by: NURSE PRACTITIONER

## 2021-09-17 PROCEDURE — 99024 POSTOP FOLLOW-UP VISIT: CPT | Mod: S$GLB,,, | Performed by: NURSE PRACTITIONER

## 2021-09-17 PROCEDURE — 3074F PR MOST RECENT SYSTOLIC BLOOD PRESSURE < 130 MM HG: ICD-10-PCS | Mod: CPTII,S$GLB,, | Performed by: NURSE PRACTITIONER

## 2021-09-17 PROCEDURE — 1126F AMNT PAIN NOTED NONE PRSNT: CPT | Mod: CPTII,S$GLB,, | Performed by: NURSE PRACTITIONER

## 2021-09-17 PROCEDURE — 3288F FALL RISK ASSESSMENT DOCD: CPT | Mod: CPTII,S$GLB,, | Performed by: NURSE PRACTITIONER

## 2021-09-17 PROCEDURE — 1160F RVW MEDS BY RX/DR IN RCRD: CPT | Mod: CPTII,S$GLB,, | Performed by: NURSE PRACTITIONER

## 2021-09-17 PROCEDURE — 83970 ASSAY OF PARATHORMONE: CPT | Performed by: NURSE PRACTITIONER

## 2021-09-17 PROCEDURE — 36415 COLL VENOUS BLD VENIPUNCTURE: CPT | Performed by: NURSE PRACTITIONER

## 2021-09-21 ENCOUNTER — TELEPHONE (OUTPATIENT)
Dept: OTOLARYNGOLOGY | Facility: CLINIC | Age: 76
End: 2021-09-21

## 2021-09-23 ENCOUNTER — PATIENT OUTREACH (OUTPATIENT)
Dept: ADMINISTRATIVE | Facility: OTHER | Age: 76
End: 2021-09-23

## 2021-09-23 ENCOUNTER — OFFICE VISIT (OUTPATIENT)
Dept: ENDOCRINOLOGY | Facility: CLINIC | Age: 76
End: 2021-09-23
Payer: MEDICARE

## 2021-09-23 ENCOUNTER — PATIENT MESSAGE (OUTPATIENT)
Dept: ENDOCRINOLOGY | Facility: CLINIC | Age: 76
End: 2021-09-23

## 2021-09-23 VITALS
OXYGEN SATURATION: 98 % | TEMPERATURE: 99 F | HEART RATE: 99 BPM | BODY MASS INDEX: 25.93 KG/M2 | SYSTOLIC BLOOD PRESSURE: 144 MMHG | WEIGHT: 155.63 LBS | DIASTOLIC BLOOD PRESSURE: 78 MMHG | HEIGHT: 65 IN

## 2021-09-23 DIAGNOSIS — C73 MALIGNANT NEOPLASM OF THYROID GLAND: Primary | ICD-10-CM

## 2021-09-23 DIAGNOSIS — E55.9 VITAMIN D INSUFFICIENCY: ICD-10-CM

## 2021-09-23 DIAGNOSIS — E89.0 POST-SURGICAL HYPOTHYROIDISM: ICD-10-CM

## 2021-09-23 DIAGNOSIS — E83.51 HYPOCALCEMIA: ICD-10-CM

## 2021-09-23 PROCEDURE — 1126F PR PAIN SEVERITY QUANTIFIED, NO PAIN PRESENT: ICD-10-PCS | Mod: CPTII,S$GLB,, | Performed by: INTERNAL MEDICINE

## 2021-09-23 PROCEDURE — 1126F AMNT PAIN NOTED NONE PRSNT: CPT | Mod: CPTII,S$GLB,, | Performed by: INTERNAL MEDICINE

## 2021-09-23 PROCEDURE — 3288F FALL RISK ASSESSMENT DOCD: CPT | Mod: CPTII,S$GLB,, | Performed by: INTERNAL MEDICINE

## 2021-09-23 PROCEDURE — 1160F RVW MEDS BY RX/DR IN RCRD: CPT | Mod: CPTII,S$GLB,, | Performed by: INTERNAL MEDICINE

## 2021-09-23 PROCEDURE — 1101F PR PT FALLS ASSESS DOC 0-1 FALLS W/OUT INJ PAST YR: ICD-10-PCS | Mod: CPTII,S$GLB,, | Performed by: INTERNAL MEDICINE

## 2021-09-23 PROCEDURE — 3288F PR FALLS RISK ASSESSMENT DOCUMENTED: ICD-10-PCS | Mod: CPTII,S$GLB,, | Performed by: INTERNAL MEDICINE

## 2021-09-23 PROCEDURE — 1159F MED LIST DOCD IN RCRD: CPT | Mod: CPTII,S$GLB,, | Performed by: INTERNAL MEDICINE

## 2021-09-23 PROCEDURE — 3077F SYST BP >= 140 MM HG: CPT | Mod: CPTII,S$GLB,, | Performed by: INTERNAL MEDICINE

## 2021-09-23 PROCEDURE — 3078F DIAST BP <80 MM HG: CPT | Mod: CPTII,S$GLB,, | Performed by: INTERNAL MEDICINE

## 2021-09-23 PROCEDURE — 99999 PR PBB SHADOW E&M-EST. PATIENT-LVL V: ICD-10-PCS | Mod: PBBFAC,,, | Performed by: INTERNAL MEDICINE

## 2021-09-23 PROCEDURE — 99999 PR PBB SHADOW E&M-EST. PATIENT-LVL V: CPT | Mod: PBBFAC,,, | Performed by: INTERNAL MEDICINE

## 2021-09-23 PROCEDURE — 3077F PR MOST RECENT SYSTOLIC BLOOD PRESSURE >= 140 MM HG: ICD-10-PCS | Mod: CPTII,S$GLB,, | Performed by: INTERNAL MEDICINE

## 2021-09-23 PROCEDURE — 99215 OFFICE O/P EST HI 40 MIN: CPT | Mod: S$GLB,,, | Performed by: INTERNAL MEDICINE

## 2021-09-23 PROCEDURE — 1159F PR MEDICATION LIST DOCUMENTED IN MEDICAL RECORD: ICD-10-PCS | Mod: CPTII,S$GLB,, | Performed by: INTERNAL MEDICINE

## 2021-09-23 PROCEDURE — 1160F PR REVIEW ALL MEDS BY PRESCRIBER/CLIN PHARMACIST DOCUMENTED: ICD-10-PCS | Mod: CPTII,S$GLB,, | Performed by: INTERNAL MEDICINE

## 2021-09-23 PROCEDURE — 99215 PR OFFICE/OUTPT VISIT, EST, LEVL V, 40-54 MIN: ICD-10-PCS | Mod: S$GLB,,, | Performed by: INTERNAL MEDICINE

## 2021-09-23 PROCEDURE — 1101F PT FALLS ASSESS-DOCD LE1/YR: CPT | Mod: CPTII,S$GLB,, | Performed by: INTERNAL MEDICINE

## 2021-09-23 PROCEDURE — 3078F PR MOST RECENT DIASTOLIC BLOOD PRESSURE < 80 MM HG: ICD-10-PCS | Mod: CPTII,S$GLB,, | Performed by: INTERNAL MEDICINE

## 2021-09-23 RX ORDER — LINACLOTIDE 290 UG/1
290 CAPSULE, GELATIN COATED ORAL DAILY
COMMUNITY
Start: 2021-09-21 | End: 2022-04-25 | Stop reason: SDUPTHER

## 2021-09-24 ENCOUNTER — OFFICE VISIT (OUTPATIENT)
Dept: OTOLARYNGOLOGY | Facility: CLINIC | Age: 76
End: 2021-09-24
Payer: MEDICARE

## 2021-09-24 VITALS
SYSTOLIC BLOOD PRESSURE: 142 MMHG | DIASTOLIC BLOOD PRESSURE: 77 MMHG | BODY MASS INDEX: 25.79 KG/M2 | HEART RATE: 87 BPM | WEIGHT: 155 LBS

## 2021-09-24 DIAGNOSIS — R29.898 NECK TIGHTNESS: ICD-10-CM

## 2021-09-24 DIAGNOSIS — C73 THYROID CANCER: ICD-10-CM

## 2021-09-24 DIAGNOSIS — R29.898 SHOULDER WEAKNESS: Primary | ICD-10-CM

## 2021-09-24 LAB
FINAL PATHOLOGIC DIAGNOSIS: NORMAL
GROSS: NORMAL
Lab: NORMAL

## 2021-09-24 PROCEDURE — 3077F PR MOST RECENT SYSTOLIC BLOOD PRESSURE >= 140 MM HG: ICD-10-PCS | Mod: CPTII,S$GLB,, | Performed by: NURSE PRACTITIONER

## 2021-09-24 PROCEDURE — 99999 PR PBB SHADOW E&M-EST. PATIENT-LVL IV: ICD-10-PCS | Mod: PBBFAC,,, | Performed by: NURSE PRACTITIONER

## 2021-09-24 PROCEDURE — 3078F DIAST BP <80 MM HG: CPT | Mod: CPTII,S$GLB,, | Performed by: NURSE PRACTITIONER

## 2021-09-24 PROCEDURE — 3077F SYST BP >= 140 MM HG: CPT | Mod: CPTII,S$GLB,, | Performed by: NURSE PRACTITIONER

## 2021-09-24 PROCEDURE — 1125F PR PAIN SEVERITY QUANTIFIED, PAIN PRESENT: ICD-10-PCS | Mod: CPTII,S$GLB,, | Performed by: NURSE PRACTITIONER

## 2021-09-24 PROCEDURE — 1159F PR MEDICATION LIST DOCUMENTED IN MEDICAL RECORD: ICD-10-PCS | Mod: CPTII,S$GLB,, | Performed by: NURSE PRACTITIONER

## 2021-09-24 PROCEDURE — 1159F MED LIST DOCD IN RCRD: CPT | Mod: CPTII,S$GLB,, | Performed by: NURSE PRACTITIONER

## 2021-09-24 PROCEDURE — 1160F PR REVIEW ALL MEDS BY PRESCRIBER/CLIN PHARMACIST DOCUMENTED: ICD-10-PCS | Mod: CPTII,S$GLB,, | Performed by: NURSE PRACTITIONER

## 2021-09-24 PROCEDURE — 3078F PR MOST RECENT DIASTOLIC BLOOD PRESSURE < 80 MM HG: ICD-10-PCS | Mod: CPTII,S$GLB,, | Performed by: NURSE PRACTITIONER

## 2021-09-24 PROCEDURE — 1160F RVW MEDS BY RX/DR IN RCRD: CPT | Mod: CPTII,S$GLB,, | Performed by: NURSE PRACTITIONER

## 2021-09-24 PROCEDURE — 99024 PR POST-OP FOLLOW-UP VISIT: ICD-10-PCS | Mod: S$GLB,,, | Performed by: NURSE PRACTITIONER

## 2021-09-24 PROCEDURE — 1125F AMNT PAIN NOTED PAIN PRSNT: CPT | Mod: CPTII,S$GLB,, | Performed by: NURSE PRACTITIONER

## 2021-09-24 PROCEDURE — 99999 PR PBB SHADOW E&M-EST. PATIENT-LVL IV: CPT | Mod: PBBFAC,,, | Performed by: NURSE PRACTITIONER

## 2021-09-24 PROCEDURE — 99024 POSTOP FOLLOW-UP VISIT: CPT | Mod: S$GLB,,, | Performed by: NURSE PRACTITIONER

## 2021-09-28 ENCOUNTER — HOSPITAL ENCOUNTER (EMERGENCY)
Facility: HOSPITAL | Age: 76
Discharge: HOME | End: 2021-09-29
Attending: EMERGENCY MEDICINE
Payer: MEDICARE

## 2021-09-28 ENCOUNTER — CLINICAL SUPPORT (OUTPATIENT)
Dept: REHABILITATION | Facility: HOSPITAL | Age: 76
End: 2021-09-28
Payer: MEDICARE

## 2021-09-28 DIAGNOSIS — F41.9 ANXIETY: ICD-10-CM

## 2021-09-28 DIAGNOSIS — R29.3 POSTURE ABNORMALITY: ICD-10-CM

## 2021-09-28 DIAGNOSIS — M25.612 DECREASED RANGE OF MOTION OF LEFT SHOULDER: ICD-10-CM

## 2021-09-28 DIAGNOSIS — G89.18 POSTOPERATIVE PAIN: Primary | ICD-10-CM

## 2021-09-28 DIAGNOSIS — L90.5 SCAR TISSUE: ICD-10-CM

## 2021-09-28 DIAGNOSIS — R29.898 NECK TIGHTNESS: ICD-10-CM

## 2021-09-28 DIAGNOSIS — M54.2 PAINFUL CERVICAL RANGE OF MOTION: ICD-10-CM

## 2021-09-28 DIAGNOSIS — R29.898 SHOULDER WEAKNESS: ICD-10-CM

## 2021-09-28 PROCEDURE — 97162 PT EVAL MOD COMPLEX 30 MIN: CPT | Mod: PN

## 2021-09-28 PROCEDURE — 99282 EMERGENCY DEPT VISIT SF MDM: CPT

## 2021-09-28 PROCEDURE — 97110 THERAPEUTIC EXERCISES: CPT | Mod: PN

## 2021-09-29 VITALS
BODY MASS INDEX: 25.83 KG/M2 | DIASTOLIC BLOOD PRESSURE: 65 MMHG | HEART RATE: 75 BPM | SYSTOLIC BLOOD PRESSURE: 144 MMHG | HEIGHT: 65 IN | TEMPERATURE: 99 F | RESPIRATION RATE: 18 BRPM | OXYGEN SATURATION: 98 % | WEIGHT: 155 LBS

## 2021-09-30 ENCOUNTER — TELEPHONE (OUTPATIENT)
Dept: ENDOCRINOLOGY | Facility: CLINIC | Age: 76
End: 2021-09-30
Payer: MEDICARE

## 2021-09-30 ENCOUNTER — HOSPITAL ENCOUNTER (EMERGENCY)
Facility: HOSPITAL | Age: 76
Discharge: HOME | End: 2021-09-30
Attending: EMERGENCY MEDICINE
Payer: MEDICARE

## 2021-09-30 ENCOUNTER — TELEPHONE (OUTPATIENT)
Dept: OTOLARYNGOLOGY | Facility: CLINIC | Age: 76
End: 2021-09-30

## 2021-09-30 VITALS
HEART RATE: 68 BPM | TEMPERATURE: 98 F | HEIGHT: 65 IN | DIASTOLIC BLOOD PRESSURE: 82 MMHG | OXYGEN SATURATION: 99 % | SYSTOLIC BLOOD PRESSURE: 146 MMHG | WEIGHT: 155 LBS | BODY MASS INDEX: 25.83 KG/M2 | RESPIRATION RATE: 16 BRPM

## 2021-09-30 DIAGNOSIS — R07.89 CHEST TIGHTNESS: ICD-10-CM

## 2021-09-30 DIAGNOSIS — F41.9 ANXIETY: Primary | ICD-10-CM

## 2021-09-30 DIAGNOSIS — E83.51 HYPOCALCEMIA: ICD-10-CM

## 2021-09-30 LAB
ANION GAP SERPL CALC-SCNC: 15 MMOL/L (ref 8–16)
BUN SERPL-MCNC: 13 MG/DL (ref 8–23)
BUN SERPL-MCNC: 17 MG/DL (ref 6–30)
CALCIUM SERPL-MCNC: 9.2 MG/DL (ref 8.7–10.5)
CHLORIDE SERPL-SCNC: 103 MMOL/L (ref 95–110)
CHLORIDE SERPL-SCNC: 104 MMOL/L (ref 95–110)
CO2 SERPL-SCNC: 23 MMOL/L (ref 23–29)
CREAT SERPL-MCNC: 0.9 MG/DL (ref 0.5–1.4)
CREAT SERPL-MCNC: 1 MG/DL (ref 0.5–1.4)
EST. GFR  (AFRICAN AMERICAN): >60 ML/MIN/1.73 M^2
EST. GFR  (NON AFRICAN AMERICAN): >60 ML/MIN/1.73 M^2
GLUCOSE SERPL-MCNC: 105 MG/DL (ref 70–110)
GLUCOSE SERPL-MCNC: 94 MG/DL (ref 70–110)
HCT VFR BLD CALC: 32 %PCV (ref 36–54)
POC IONIZED CALCIUM: 1.01 MMOL/L (ref 1.06–1.42)
POC TCO2 (MEASURED): 29 MMOL/L (ref 23–29)
POTASSIUM BLD-SCNC: 4.3 MMOL/L (ref 3.5–5.1)
POTASSIUM SERPL-SCNC: 3.9 MMOL/L (ref 3.5–5.1)
SAMPLE: ABNORMAL
SODIUM BLD-SCNC: 139 MMOL/L (ref 136–145)
SODIUM SERPL-SCNC: 141 MMOL/L (ref 136–145)
T4 FREE SERPL-MCNC: 1.28 NG/DL (ref 0.71–1.51)
TSH SERPL DL<=0.005 MIU/L-ACNC: 0.18 UIU/ML (ref 0.4–4)

## 2021-09-30 PROCEDURE — 93005 ELECTROCARDIOGRAM TRACING: CPT

## 2021-09-30 PROCEDURE — 80047 BASIC METABLC PNL IONIZED CA: CPT

## 2021-09-30 PROCEDURE — 99284 EMERGENCY DEPT VISIT MOD MDM: CPT | Mod: ,,, | Performed by: PHYSICIAN ASSISTANT

## 2021-09-30 PROCEDURE — 84439 ASSAY OF FREE THYROXINE: CPT | Performed by: PHYSICIAN ASSISTANT

## 2021-09-30 PROCEDURE — 80048 BASIC METABOLIC PNL TOTAL CA: CPT | Performed by: PHYSICIAN ASSISTANT

## 2021-09-30 PROCEDURE — 84443 ASSAY THYROID STIM HORMONE: CPT | Performed by: PHYSICIAN ASSISTANT

## 2021-09-30 PROCEDURE — 93010 EKG 12-LEAD: ICD-10-PCS | Mod: ,,, | Performed by: INTERNAL MEDICINE

## 2021-09-30 PROCEDURE — 93010 ELECTROCARDIOGRAM REPORT: CPT | Mod: ,,, | Performed by: INTERNAL MEDICINE

## 2021-09-30 PROCEDURE — 99284 EMERGENCY DEPT VISIT MOD MDM: CPT | Mod: 25

## 2021-09-30 PROCEDURE — 25000003 PHARM REV CODE 250: Performed by: PHYSICIAN ASSISTANT

## 2021-09-30 PROCEDURE — 99284 PR EMERGENCY DEPT VISIT,LEVEL IV: ICD-10-PCS | Mod: ,,, | Performed by: PHYSICIAN ASSISTANT

## 2021-09-30 RX ORDER — CALCIUM CARBONATE 200(500)MG
1000 TABLET,CHEWABLE ORAL
Status: COMPLETED | OUTPATIENT
Start: 2021-09-30 | End: 2021-09-30

## 2021-09-30 RX ORDER — METHOCARBAMOL 500 MG/1
500 TABLET, FILM COATED ORAL
Status: COMPLETED | OUTPATIENT
Start: 2021-09-30 | End: 2021-09-30

## 2021-09-30 RX ADMIN — METHOCARBAMOL 500 MG: 500 TABLET ORAL at 09:09

## 2021-09-30 RX ADMIN — CALCIUM CARBONATE (ANTACID) CHEW TAB 500 MG 1000 MG: 500 CHEW TAB at 11:09

## 2021-10-01 ENCOUNTER — HOSPITAL ENCOUNTER (EMERGENCY)
Facility: HOSPITAL | Age: 76
Discharge: HOME | End: 2021-10-01
Attending: EMERGENCY MEDICINE
Payer: MEDICARE

## 2021-10-01 ENCOUNTER — TELEPHONE (OUTPATIENT)
Dept: FAMILY MEDICINE | Facility: CLINIC | Age: 76
End: 2021-10-01

## 2021-10-01 VITALS
OXYGEN SATURATION: 99 % | BODY MASS INDEX: 24.91 KG/M2 | TEMPERATURE: 99 F | RESPIRATION RATE: 16 BRPM | WEIGHT: 155 LBS | DIASTOLIC BLOOD PRESSURE: 79 MMHG | SYSTOLIC BLOOD PRESSURE: 151 MMHG | HEIGHT: 66 IN | HEART RATE: 97 BPM

## 2021-10-01 DIAGNOSIS — G89.18 POST-OP PAIN: ICD-10-CM

## 2021-10-01 DIAGNOSIS — F41.9 ANXIETY: Primary | ICD-10-CM

## 2021-10-01 LAB
ALBUMIN SERPL BCP-MCNC: 3.5 G/DL (ref 3.5–5.2)
ALP SERPL-CCNC: 49 U/L (ref 55–135)
ALT SERPL W/O P-5'-P-CCNC: 20 U/L (ref 10–44)
ANION GAP SERPL CALC-SCNC: 13 MMOL/L (ref 8–16)
AST SERPL-CCNC: 18 U/L (ref 10–40)
BASOPHILS # BLD AUTO: 0.08 K/UL (ref 0–0.2)
BASOPHILS NFR BLD: 1.5 % (ref 0–1.9)
BILIRUB SERPL-MCNC: 0.3 MG/DL (ref 0.1–1)
BUN SERPL-MCNC: 13 MG/DL (ref 8–23)
CA-I BLDV-SCNC: 1.09 MMOL/L (ref 1.06–1.42)
CALCIUM SERPL-MCNC: 8.9 MG/DL (ref 8.7–10.5)
CHLORIDE SERPL-SCNC: 106 MMOL/L (ref 95–110)
CO2 SERPL-SCNC: 26 MMOL/L (ref 23–29)
CREAT SERPL-MCNC: 1.1 MG/DL (ref 0.5–1.4)
DIFFERENTIAL METHOD: ABNORMAL
EOSINOPHIL # BLD AUTO: 0.3 K/UL (ref 0–0.5)
EOSINOPHIL NFR BLD: 5.7 % (ref 0–8)
ERYTHROCYTE [DISTWIDTH] IN BLOOD BY AUTOMATED COUNT: 12.7 % (ref 11.5–14.5)
EST. GFR  (AFRICAN AMERICAN): 56 ML/MIN/1.73 M^2
EST. GFR  (NON AFRICAN AMERICAN): 49 ML/MIN/1.73 M^2
GLUCOSE SERPL-MCNC: 108 MG/DL (ref 70–110)
HCT VFR BLD AUTO: 34.2 % (ref 37–48.5)
HGB BLD-MCNC: 10.7 G/DL (ref 12–16)
IMM GRANULOCYTES # BLD AUTO: 0.01 K/UL (ref 0–0.04)
IMM GRANULOCYTES NFR BLD AUTO: 0.2 % (ref 0–0.5)
LYMPHOCYTES # BLD AUTO: 1.3 K/UL (ref 1–4.8)
LYMPHOCYTES NFR BLD: 24.3 % (ref 18–48)
MAGNESIUM SERPL-MCNC: 1.9 MG/DL (ref 1.6–2.6)
MCH RBC QN AUTO: 28.8 PG (ref 27–31)
MCHC RBC AUTO-ENTMCNC: 31.3 G/DL (ref 32–36)
MCV RBC AUTO: 92 FL (ref 82–98)
MONOCYTES # BLD AUTO: 0.4 K/UL (ref 0.3–1)
MONOCYTES NFR BLD: 7.1 % (ref 4–15)
NEUTROPHILS # BLD AUTO: 3.4 K/UL (ref 1.8–7.7)
NEUTROPHILS NFR BLD: 61.2 % (ref 38–73)
NRBC BLD-RTO: 0 /100 WBC
PLATELET # BLD AUTO: 508 K/UL (ref 150–450)
PMV BLD AUTO: 8.6 FL (ref 9.2–12.9)
POTASSIUM SERPL-SCNC: 3.8 MMOL/L (ref 3.5–5.1)
PROT SERPL-MCNC: 7.3 G/DL (ref 6–8.4)
PTH-INTACT SERPL-MCNC: 28.4 PG/ML (ref 9–77)
RBC # BLD AUTO: 3.72 M/UL (ref 4–5.4)
SODIUM SERPL-SCNC: 145 MMOL/L (ref 136–145)
WBC # BLD AUTO: 5.48 K/UL (ref 3.9–12.7)

## 2021-10-01 PROCEDURE — 83970 ASSAY OF PARATHORMONE: CPT | Performed by: NURSE PRACTITIONER

## 2021-10-01 PROCEDURE — 82330 ASSAY OF CALCIUM: CPT | Performed by: NURSE PRACTITIONER

## 2021-10-01 PROCEDURE — 99283 EMERGENCY DEPT VISIT LOW MDM: CPT | Mod: 25

## 2021-10-01 PROCEDURE — 36415 COLL VENOUS BLD VENIPUNCTURE: CPT | Performed by: NURSE PRACTITIONER

## 2021-10-01 PROCEDURE — 83735 ASSAY OF MAGNESIUM: CPT | Performed by: NURSE PRACTITIONER

## 2021-10-01 PROCEDURE — 85025 COMPLETE CBC W/AUTO DIFF WBC: CPT | Performed by: NURSE PRACTITIONER

## 2021-10-01 PROCEDURE — 80053 COMPREHEN METABOLIC PANEL: CPT | Performed by: NURSE PRACTITIONER

## 2021-10-04 ENCOUNTER — LAB VISIT (OUTPATIENT)
Dept: LAB | Facility: HOSPITAL | Age: 76
End: 2021-10-04
Attending: INTERNAL MEDICINE
Payer: MEDICARE

## 2021-10-04 ENCOUNTER — TELEPHONE (OUTPATIENT)
Dept: OTOLARYNGOLOGY | Facility: CLINIC | Age: 76
End: 2021-10-04

## 2021-10-04 ENCOUNTER — OFFICE VISIT (OUTPATIENT)
Dept: FAMILY MEDICINE | Facility: CLINIC | Age: 76
End: 2021-10-04
Payer: MEDICARE

## 2021-10-04 VITALS
SYSTOLIC BLOOD PRESSURE: 138 MMHG | TEMPERATURE: 98 F | WEIGHT: 156.31 LBS | HEART RATE: 70 BPM | OXYGEN SATURATION: 98 % | DIASTOLIC BLOOD PRESSURE: 64 MMHG | HEIGHT: 66 IN | BODY MASS INDEX: 25.12 KG/M2

## 2021-10-04 DIAGNOSIS — E55.9 VITAMIN D INSUFFICIENCY: ICD-10-CM

## 2021-10-04 DIAGNOSIS — C73 MALIGNANT NEOPLASM OF THYROID GLAND: ICD-10-CM

## 2021-10-04 DIAGNOSIS — G47.9 SLEEP DISORDER: ICD-10-CM

## 2021-10-04 DIAGNOSIS — E89.0 POSTOPERATIVE HYPOTHYROIDISM: ICD-10-CM

## 2021-10-04 DIAGNOSIS — F41.9 ANXIETY: Primary | ICD-10-CM

## 2021-10-04 DIAGNOSIS — E11.00 TYPE 2 DIABETES MELLITUS WITH HYPEROSMOLARITY WITHOUT COMA, WITHOUT LONG-TERM CURRENT USE OF INSULIN: ICD-10-CM

## 2021-10-04 DIAGNOSIS — M54.2 PAINFUL CERVICAL RANGE OF MOTION: ICD-10-CM

## 2021-10-04 DIAGNOSIS — C73 THYROID CANCER: ICD-10-CM

## 2021-10-04 DIAGNOSIS — E83.51 HYPOCALCEMIA: ICD-10-CM

## 2021-10-04 DIAGNOSIS — E55.9 VITAMIN D DEFICIENCY: ICD-10-CM

## 2021-10-04 LAB
25(OH)D3+25(OH)D2 SERPL-MCNC: 50 NG/ML (ref 30–96)
ALBUMIN SERPL BCP-MCNC: 3.6 G/DL (ref 3.5–5.2)
ALP SERPL-CCNC: 46 U/L (ref 55–135)
ALT SERPL W/O P-5'-P-CCNC: 16 U/L (ref 10–44)
ANION GAP SERPL CALC-SCNC: 14 MMOL/L (ref 8–16)
AST SERPL-CCNC: 17 U/L (ref 10–40)
BILIRUB SERPL-MCNC: 0.3 MG/DL (ref 0.1–1)
BUN SERPL-MCNC: 18 MG/DL (ref 8–23)
CALCIUM SERPL-MCNC: 9.1 MG/DL (ref 8.7–10.5)
CHLORIDE SERPL-SCNC: 103 MMOL/L (ref 95–110)
CO2 SERPL-SCNC: 24 MMOL/L (ref 23–29)
CREAT SERPL-MCNC: 0.8 MG/DL (ref 0.5–1.4)
EST. GFR  (AFRICAN AMERICAN): >60 ML/MIN/1.73 M^2
EST. GFR  (NON AFRICAN AMERICAN): >60 ML/MIN/1.73 M^2
GLUCOSE SERPL-MCNC: 88 MG/DL (ref 70–110)
POTASSIUM SERPL-SCNC: 4.3 MMOL/L (ref 3.5–5.1)
PROT SERPL-MCNC: 7.4 G/DL (ref 6–8.4)
PTH-INTACT SERPL-MCNC: 35 PG/ML (ref 9–77)
SODIUM SERPL-SCNC: 141 MMOL/L (ref 136–145)
T4 FREE SERPL-MCNC: 1.37 NG/DL (ref 0.71–1.51)
TSH SERPL DL<=0.005 MIU/L-ACNC: 0.03 UIU/ML (ref 0.4–4)

## 2021-10-04 PROCEDURE — 1101F PR PT FALLS ASSESS DOC 0-1 FALLS W/OUT INJ PAST YR: ICD-10-PCS | Mod: CPTII,S$GLB,, | Performed by: PHYSICIAN ASSISTANT

## 2021-10-04 PROCEDURE — 1101F PT FALLS ASSESS-DOCD LE1/YR: CPT | Mod: CPTII,S$GLB,, | Performed by: PHYSICIAN ASSISTANT

## 2021-10-04 PROCEDURE — 86800 THYROGLOBULIN ANTIBODY: CPT | Performed by: INTERNAL MEDICINE

## 2021-10-04 PROCEDURE — 1126F PR PAIN SEVERITY QUANTIFIED, NO PAIN PRESENT: ICD-10-PCS | Mod: CPTII,S$GLB,, | Performed by: PHYSICIAN ASSISTANT

## 2021-10-04 PROCEDURE — 3078F DIAST BP <80 MM HG: CPT | Mod: CPTII,S$GLB,, | Performed by: PHYSICIAN ASSISTANT

## 2021-10-04 PROCEDURE — 1126F AMNT PAIN NOTED NONE PRSNT: CPT | Mod: CPTII,S$GLB,, | Performed by: PHYSICIAN ASSISTANT

## 2021-10-04 PROCEDURE — 99999 PR PBB SHADOW E&M-EST. PATIENT-LVL V: ICD-10-PCS | Mod: PBBFAC,,, | Performed by: PHYSICIAN ASSISTANT

## 2021-10-04 PROCEDURE — 3078F PR MOST RECENT DIASTOLIC BLOOD PRESSURE < 80 MM HG: ICD-10-PCS | Mod: CPTII,S$GLB,, | Performed by: PHYSICIAN ASSISTANT

## 2021-10-04 PROCEDURE — 99499 UNLISTED E&M SERVICE: CPT | Mod: S$GLB,,, | Performed by: PHYSICIAN ASSISTANT

## 2021-10-04 PROCEDURE — 80053 COMPREHEN METABOLIC PANEL: CPT | Performed by: INTERNAL MEDICINE

## 2021-10-04 PROCEDURE — 82306 VITAMIN D 25 HYDROXY: CPT | Performed by: INTERNAL MEDICINE

## 2021-10-04 PROCEDURE — 99214 OFFICE O/P EST MOD 30 MIN: CPT | Mod: S$GLB,,, | Performed by: PHYSICIAN ASSISTANT

## 2021-10-04 PROCEDURE — 99499 RISK ADDL DX/OHS AUDIT: ICD-10-PCS | Mod: S$GLB,,, | Performed by: PHYSICIAN ASSISTANT

## 2021-10-04 PROCEDURE — 3288F PR FALLS RISK ASSESSMENT DOCUMENTED: ICD-10-PCS | Mod: CPTII,S$GLB,, | Performed by: PHYSICIAN ASSISTANT

## 2021-10-04 PROCEDURE — 99999 PR PBB SHADOW E&M-EST. PATIENT-LVL V: CPT | Mod: PBBFAC,,, | Performed by: PHYSICIAN ASSISTANT

## 2021-10-04 PROCEDURE — 1111F DSCHRG MED/CURRENT MED MERGE: CPT | Mod: CPTII,S$GLB,, | Performed by: PHYSICIAN ASSISTANT

## 2021-10-04 PROCEDURE — 99214 PR OFFICE/OUTPT VISIT, EST, LEVL IV, 30-39 MIN: ICD-10-PCS | Mod: S$GLB,,, | Performed by: PHYSICIAN ASSISTANT

## 2021-10-04 PROCEDURE — 3288F FALL RISK ASSESSMENT DOCD: CPT | Mod: CPTII,S$GLB,, | Performed by: PHYSICIAN ASSISTANT

## 2021-10-04 PROCEDURE — 1111F PR DISCHARGE MEDS RECONCILED W/ CURRENT OUTPATIENT MED LIST: ICD-10-PCS | Mod: CPTII,S$GLB,, | Performed by: PHYSICIAN ASSISTANT

## 2021-10-04 PROCEDURE — 84439 ASSAY OF FREE THYROXINE: CPT | Performed by: INTERNAL MEDICINE

## 2021-10-04 PROCEDURE — 3075F PR MOST RECENT SYSTOLIC BLOOD PRESS GE 130-139MM HG: ICD-10-PCS | Mod: CPTII,S$GLB,, | Performed by: PHYSICIAN ASSISTANT

## 2021-10-04 PROCEDURE — 36415 COLL VENOUS BLD VENIPUNCTURE: CPT | Mod: PO | Performed by: INTERNAL MEDICINE

## 2021-10-04 PROCEDURE — 83970 ASSAY OF PARATHORMONE: CPT | Performed by: INTERNAL MEDICINE

## 2021-10-04 PROCEDURE — 3075F SYST BP GE 130 - 139MM HG: CPT | Mod: CPTII,S$GLB,, | Performed by: PHYSICIAN ASSISTANT

## 2021-10-04 PROCEDURE — 84443 ASSAY THYROID STIM HORMONE: CPT | Performed by: INTERNAL MEDICINE

## 2021-10-04 RX ORDER — BUSPIRONE HYDROCHLORIDE 5 MG/1
5 TABLET ORAL 2 TIMES DAILY
Qty: 60 TABLET | Refills: 11 | Status: SHIPPED | OUTPATIENT
Start: 2021-10-04 | End: 2021-12-28 | Stop reason: CLARIF

## 2021-10-05 ENCOUNTER — CLINICAL SUPPORT (OUTPATIENT)
Dept: REHABILITATION | Facility: HOSPITAL | Age: 76
End: 2021-10-05
Payer: MEDICARE

## 2021-10-05 ENCOUNTER — LAB VISIT (OUTPATIENT)
Dept: PRIMARY CARE CLINIC | Facility: CLINIC | Age: 76
End: 2021-10-05
Payer: MEDICARE

## 2021-10-05 DIAGNOSIS — M54.2 PAINFUL CERVICAL RANGE OF MOTION: ICD-10-CM

## 2021-10-05 DIAGNOSIS — Z01.818 PRE-OP TESTING: ICD-10-CM

## 2021-10-05 DIAGNOSIS — M25.612 DECREASED RANGE OF MOTION OF LEFT SHOULDER: ICD-10-CM

## 2021-10-05 DIAGNOSIS — R29.3 POSTURE ABNORMALITY: ICD-10-CM

## 2021-10-05 PROCEDURE — 97110 THERAPEUTIC EXERCISES: CPT | Mod: KX,PN

## 2021-10-05 PROCEDURE — U0005 INFEC AGEN DETEC AMPLI PROBE: HCPCS | Performed by: NURSE PRACTITIONER

## 2021-10-05 PROCEDURE — U0003 INFECTIOUS AGENT DETECTION BY NUCLEIC ACID (DNA OR RNA); SEVERE ACUTE RESPIRATORY SYNDROME CORONAVIRUS 2 (SARS-COV-2) (CORONAVIRUS DISEASE [COVID-19]), AMPLIFIED PROBE TECHNIQUE, MAKING USE OF HIGH THROUGHPUT TECHNOLOGIES AS DESCRIBED BY CMS-2020-01-R: HCPCS | Performed by: NURSE PRACTITIONER

## 2021-10-05 PROCEDURE — 97112 NEUROMUSCULAR REEDUCATION: CPT | Mod: KX,PN

## 2021-10-06 ENCOUNTER — CLINICAL SUPPORT (OUTPATIENT)
Dept: REHABILITATION | Facility: HOSPITAL | Age: 76
End: 2021-10-06
Payer: MEDICARE

## 2021-10-06 DIAGNOSIS — R29.3 POSTURE ABNORMALITY: ICD-10-CM

## 2021-10-06 DIAGNOSIS — M25.612 DECREASED RANGE OF MOTION OF LEFT SHOULDER: ICD-10-CM

## 2021-10-06 DIAGNOSIS — M54.2 PAINFUL CERVICAL RANGE OF MOTION: ICD-10-CM

## 2021-10-06 LAB
SARS-COV-2 RNA RESP QL NAA+PROBE: NOT DETECTED
SARS-COV-2- CYCLE NUMBER: NORMAL
THRYOGLOBULIN INTERPRETATION: ABNORMAL
THYROGLOB AB SERPL-ACNC: <1.8 IU/ML
THYROGLOB SERPL-MCNC: 12 NG/ML

## 2021-10-06 PROCEDURE — 97140 MANUAL THERAPY 1/> REGIONS: CPT | Mod: KX,PN

## 2021-10-06 PROCEDURE — 97110 THERAPEUTIC EXERCISES: CPT | Mod: KX,PN

## 2021-10-08 ENCOUNTER — OFFICE VISIT (OUTPATIENT)
Dept: OTOLARYNGOLOGY | Facility: CLINIC | Age: 76
End: 2021-10-08
Payer: MEDICARE

## 2021-10-08 ENCOUNTER — TELEPHONE (OUTPATIENT)
Dept: FAMILY MEDICINE | Facility: CLINIC | Age: 76
End: 2021-10-08

## 2021-10-08 VITALS
BODY MASS INDEX: 25.28 KG/M2 | HEART RATE: 99 BPM | DIASTOLIC BLOOD PRESSURE: 74 MMHG | WEIGHT: 155.44 LBS | DIASTOLIC BLOOD PRESSURE: 74 MMHG | SYSTOLIC BLOOD PRESSURE: 126 MMHG | HEART RATE: 99 BPM | SYSTOLIC BLOOD PRESSURE: 126 MMHG

## 2021-10-08 DIAGNOSIS — R49.0 DYSPHONIA: Primary | ICD-10-CM

## 2021-10-08 DIAGNOSIS — C73 THYROID CANCER: Primary | ICD-10-CM

## 2021-10-08 DIAGNOSIS — J38.01 UNILATERAL COMPLETE VOCAL FOLD PARALYSIS: ICD-10-CM

## 2021-10-08 PROCEDURE — 1111F DSCHRG MED/CURRENT MED MERGE: CPT | Mod: CPTII,S$GLB,, | Performed by: OTOLARYNGOLOGY

## 2021-10-08 PROCEDURE — 3078F PR MOST RECENT DIASTOLIC BLOOD PRESSURE < 80 MM HG: ICD-10-PCS | Mod: CPTII,S$GLB,, | Performed by: NURSE PRACTITIONER

## 2021-10-08 PROCEDURE — 1126F AMNT PAIN NOTED NONE PRSNT: CPT | Mod: CPTII,S$GLB,, | Performed by: OTOLARYNGOLOGY

## 2021-10-08 PROCEDURE — 3078F DIAST BP <80 MM HG: CPT | Mod: CPTII,S$GLB,, | Performed by: OTOLARYNGOLOGY

## 2021-10-08 PROCEDURE — 99999 PR PBB SHADOW E&M-EST. PATIENT-LVL IV: ICD-10-PCS | Mod: PBBFAC,,, | Performed by: NURSE PRACTITIONER

## 2021-10-08 PROCEDURE — 3074F SYST BP LT 130 MM HG: CPT | Mod: CPTII,S$GLB,, | Performed by: NURSE PRACTITIONER

## 2021-10-08 PROCEDURE — 99999 PR PBB SHADOW E&M-EST. PATIENT-LVL IV: ICD-10-PCS | Mod: PBBFAC,,, | Performed by: OTOLARYNGOLOGY

## 2021-10-08 PROCEDURE — 3074F SYST BP LT 130 MM HG: CPT | Mod: CPTII,S$GLB,, | Performed by: OTOLARYNGOLOGY

## 2021-10-08 PROCEDURE — 1126F PR PAIN SEVERITY QUANTIFIED, NO PAIN PRESENT: ICD-10-PCS | Mod: CPTII,S$GLB,, | Performed by: NURSE PRACTITIONER

## 2021-10-08 PROCEDURE — 1111F PR DISCHARGE MEDS RECONCILED W/ CURRENT OUTPATIENT MED LIST: ICD-10-PCS | Mod: CPTII,S$GLB,, | Performed by: OTOLARYNGOLOGY

## 2021-10-08 PROCEDURE — 1160F PR REVIEW ALL MEDS BY PRESCRIBER/CLIN PHARMACIST DOCUMENTED: ICD-10-PCS | Mod: CPTII,S$GLB,, | Performed by: NURSE PRACTITIONER

## 2021-10-08 PROCEDURE — 1101F PR PT FALLS ASSESS DOC 0-1 FALLS W/OUT INJ PAST YR: ICD-10-PCS | Mod: CPTII,S$GLB,, | Performed by: OTOLARYNGOLOGY

## 2021-10-08 PROCEDURE — 31579 LARYNGOSCOPY TELESCOPIC: CPT | Mod: 58,S$GLB,, | Performed by: OTOLARYNGOLOGY

## 2021-10-08 PROCEDURE — 99499 RISK ADDL DX/OHS AUDIT: ICD-10-PCS | Mod: S$GLB,,, | Performed by: NURSE PRACTITIONER

## 2021-10-08 PROCEDURE — 1159F PR MEDICATION LIST DOCUMENTED IN MEDICAL RECORD: ICD-10-PCS | Mod: CPTII,S$GLB,, | Performed by: NURSE PRACTITIONER

## 2021-10-08 PROCEDURE — 99999 PR PBB SHADOW E&M-EST. PATIENT-LVL IV: CPT | Mod: PBBFAC,,, | Performed by: OTOLARYNGOLOGY

## 2021-10-08 PROCEDURE — 3288F FALL RISK ASSESSMENT DOCD: CPT | Mod: CPTII,S$GLB,, | Performed by: OTOLARYNGOLOGY

## 2021-10-08 PROCEDURE — 99024 PR POST-OP FOLLOW-UP VISIT: ICD-10-PCS | Mod: S$GLB,,, | Performed by: NURSE PRACTITIONER

## 2021-10-08 PROCEDURE — 3078F PR MOST RECENT DIASTOLIC BLOOD PRESSURE < 80 MM HG: ICD-10-PCS | Mod: CPTII,S$GLB,, | Performed by: OTOLARYNGOLOGY

## 2021-10-08 PROCEDURE — 3288F FALL RISK ASSESSMENT DOCD: CPT | Mod: CPTII,S$GLB,, | Performed by: NURSE PRACTITIONER

## 2021-10-08 PROCEDURE — 1159F MED LIST DOCD IN RCRD: CPT | Mod: CPTII,S$GLB,, | Performed by: NURSE PRACTITIONER

## 2021-10-08 PROCEDURE — 1126F AMNT PAIN NOTED NONE PRSNT: CPT | Mod: CPTII,S$GLB,, | Performed by: NURSE PRACTITIONER

## 2021-10-08 PROCEDURE — 99024 POSTOP FOLLOW-UP VISIT: CPT | Mod: S$GLB,,, | Performed by: NURSE PRACTITIONER

## 2021-10-08 PROCEDURE — 1126F PR PAIN SEVERITY QUANTIFIED, NO PAIN PRESENT: ICD-10-PCS | Mod: CPTII,S$GLB,, | Performed by: OTOLARYNGOLOGY

## 2021-10-08 PROCEDURE — 3288F PR FALLS RISK ASSESSMENT DOCUMENTED: ICD-10-PCS | Mod: CPTII,S$GLB,, | Performed by: OTOLARYNGOLOGY

## 2021-10-08 PROCEDURE — 3074F PR MOST RECENT SYSTOLIC BLOOD PRESSURE < 130 MM HG: ICD-10-PCS | Mod: CPTII,S$GLB,, | Performed by: NURSE PRACTITIONER

## 2021-10-08 PROCEDURE — 99024 PR POST-OP FOLLOW-UP VISIT: ICD-10-PCS | Mod: S$GLB,,, | Performed by: OTOLARYNGOLOGY

## 2021-10-08 PROCEDURE — 99024 POSTOP FOLLOW-UP VISIT: CPT | Mod: S$GLB,,, | Performed by: OTOLARYNGOLOGY

## 2021-10-08 PROCEDURE — 99999 PR PBB SHADOW E&M-EST. PATIENT-LVL IV: CPT | Mod: PBBFAC,,, | Performed by: NURSE PRACTITIONER

## 2021-10-08 PROCEDURE — 3078F DIAST BP <80 MM HG: CPT | Mod: CPTII,S$GLB,, | Performed by: NURSE PRACTITIONER

## 2021-10-08 PROCEDURE — 31579 PR LARYNGOSCOPY, FLEX/RIGID TELESCOPIC, W/STROBOSCOPY: ICD-10-PCS | Mod: 58,S$GLB,, | Performed by: OTOLARYNGOLOGY

## 2021-10-08 PROCEDURE — 1101F PT FALLS ASSESS-DOCD LE1/YR: CPT | Mod: CPTII,S$GLB,, | Performed by: OTOLARYNGOLOGY

## 2021-10-08 PROCEDURE — 3288F PR FALLS RISK ASSESSMENT DOCUMENTED: ICD-10-PCS | Mod: CPTII,S$GLB,, | Performed by: NURSE PRACTITIONER

## 2021-10-08 PROCEDURE — 1101F PR PT FALLS ASSESS DOC 0-1 FALLS W/OUT INJ PAST YR: ICD-10-PCS | Mod: CPTII,S$GLB,, | Performed by: NURSE PRACTITIONER

## 2021-10-08 PROCEDURE — 1160F RVW MEDS BY RX/DR IN RCRD: CPT | Mod: CPTII,S$GLB,, | Performed by: NURSE PRACTITIONER

## 2021-10-08 PROCEDURE — 3074F PR MOST RECENT SYSTOLIC BLOOD PRESSURE < 130 MM HG: ICD-10-PCS | Mod: CPTII,S$GLB,, | Performed by: OTOLARYNGOLOGY

## 2021-10-08 PROCEDURE — 99499 UNLISTED E&M SERVICE: CPT | Mod: S$GLB,,, | Performed by: NURSE PRACTITIONER

## 2021-10-08 PROCEDURE — 1101F PT FALLS ASSESS-DOCD LE1/YR: CPT | Mod: CPTII,S$GLB,, | Performed by: NURSE PRACTITIONER

## 2021-10-12 ENCOUNTER — IMMUNIZATION (OUTPATIENT)
Dept: PRIMARY CARE CLINIC | Facility: CLINIC | Age: 76
End: 2021-10-12
Payer: MEDICARE

## 2021-10-12 ENCOUNTER — TELEPHONE (OUTPATIENT)
Dept: ENDOCRINOLOGY | Facility: CLINIC | Age: 76
End: 2021-10-12
Payer: MEDICARE

## 2021-10-12 ENCOUNTER — CLINICAL SUPPORT (OUTPATIENT)
Dept: REHABILITATION | Facility: HOSPITAL | Age: 76
End: 2021-10-12
Payer: MEDICARE

## 2021-10-12 DIAGNOSIS — C73 MALIGNANT NEOPLASM OF THYROID GLAND: Primary | ICD-10-CM

## 2021-10-12 DIAGNOSIS — R29.3 POSTURE ABNORMALITY: Primary | ICD-10-CM

## 2021-10-12 DIAGNOSIS — Z23 NEED FOR VACCINATION: Primary | ICD-10-CM

## 2021-10-12 DIAGNOSIS — M54.2 PAINFUL CERVICAL RANGE OF MOTION: ICD-10-CM

## 2021-10-12 DIAGNOSIS — M25.612 DECREASED RANGE OF MOTION OF LEFT SHOULDER: ICD-10-CM

## 2021-10-12 PROCEDURE — 0003A COVID-19, MRNA, LNP-S, PF, 30 MCG/0.3 ML DOSE VACCINE: CPT | Mod: S$GLB,,, | Performed by: FAMILY MEDICINE

## 2021-10-12 PROCEDURE — 0003A COVID-19, MRNA, LNP-S, PF, 30 MCG/0.3 ML DOSE VACCINE: ICD-10-PCS | Mod: S$GLB,,, | Performed by: FAMILY MEDICINE

## 2021-10-12 PROCEDURE — 97140 MANUAL THERAPY 1/> REGIONS: CPT | Mod: PN

## 2021-10-12 PROCEDURE — 97110 THERAPEUTIC EXERCISES: CPT | Mod: PN

## 2021-10-12 PROCEDURE — 91300 COVID-19, MRNA, LNP-S, PF, 30 MCG/0.3 ML DOSE VACCINE: ICD-10-PCS | Mod: S$GLB,,, | Performed by: FAMILY MEDICINE

## 2021-10-12 PROCEDURE — 91300 COVID-19, MRNA, LNP-S, PF, 30 MCG/0.3 ML DOSE VACCINE: CPT | Mod: S$GLB,,, | Performed by: FAMILY MEDICINE

## 2021-10-12 NOTE — TELEPHONE ENCOUNTER
Labs reviewed.    Last thyroglobulin:  Lab Results   Component Value Date    THYGLBTUM 12 (H) 10/04/2021    THGABSCRN <1.8 10/04/2021       Lab Results   Component Value Date    TSH 0.032 (L) 10/04/2021    FREET4 1.37 10/04/2021     Initial surgery 5/19. Margins +.   TG high.    Repeat surgery 9/13/2021: 5/28 LN with PTC    Lymph nodes left neck, level 2A: 1/6 + for PTC   left level 3, 1/6 LN + for PTC   left level 4, 2/14 LN + for PTC    Soft tissue, scar tissue neck: Negative    Completion thyroidectomy and paratracheal LN: 1/2 LN + for PTC.    Want to give thyrogen stimulated radioactive iodine.    Protocol:    1. 2 weeks low iodine diet.  2. Monday: Thyrogen  3. Tuesday: Thyrogen  4: Wednesday: labs and then radioactive iodine. 100 mCi  5. 7-10 days after: post-treatment whole body scan.    Aim for Thyrogen November 8th, 9th.    Labs 10th and then radioactive iodine after that.   NM and isolation per protocol   post-treatment scan around Nov 17th.

## 2021-10-15 ENCOUNTER — TELEPHONE (OUTPATIENT)
Dept: FAMILY MEDICINE | Facility: CLINIC | Age: 76
End: 2021-10-15

## 2021-10-15 ENCOUNTER — TELEPHONE (OUTPATIENT)
Dept: OTOLARYNGOLOGY | Facility: CLINIC | Age: 76
End: 2021-10-15

## 2021-10-16 ENCOUNTER — NURSE TRIAGE (OUTPATIENT)
Dept: ADMINISTRATIVE | Facility: CLINIC | Age: 76
End: 2021-10-16

## 2021-10-18 ENCOUNTER — TELEPHONE (OUTPATIENT)
Dept: OTOLARYNGOLOGY | Facility: CLINIC | Age: 76
End: 2021-10-18

## 2021-10-18 ENCOUNTER — OFFICE VISIT (OUTPATIENT)
Dept: OPHTHALMOLOGY | Facility: CLINIC | Age: 76
End: 2021-10-18
Payer: MEDICARE

## 2021-10-18 DIAGNOSIS — H40.013 OPEN ANGLE WITH BORDERLINE FINDINGS AND LOW GLAUCOMA RISK IN BOTH EYES: Primary | ICD-10-CM

## 2021-10-18 DIAGNOSIS — H04.123 DRY EYE SYNDROME, BILATERAL: ICD-10-CM

## 2021-10-18 DIAGNOSIS — H25.813 COMBINED FORMS OF AGE-RELATED CATARACT, BILATERAL: ICD-10-CM

## 2021-10-18 PROCEDURE — 1126F AMNT PAIN NOTED NONE PRSNT: CPT | Mod: CPTII,S$GLB,, | Performed by: OPHTHALMOLOGY

## 2021-10-18 PROCEDURE — 1159F PR MEDICATION LIST DOCUMENTED IN MEDICAL RECORD: ICD-10-PCS | Mod: CPTII,S$GLB,, | Performed by: OPHTHALMOLOGY

## 2021-10-18 PROCEDURE — 99213 OFFICE O/P EST LOW 20 MIN: CPT | Mod: S$GLB,,, | Performed by: OPHTHALMOLOGY

## 2021-10-18 PROCEDURE — 3288F PR FALLS RISK ASSESSMENT DOCUMENTED: ICD-10-PCS | Mod: CPTII,S$GLB,, | Performed by: OPHTHALMOLOGY

## 2021-10-18 PROCEDURE — 92020 GONIOSCOPY: CPT | Mod: S$GLB,,, | Performed by: OPHTHALMOLOGY

## 2021-10-18 PROCEDURE — 3288F FALL RISK ASSESSMENT DOCD: CPT | Mod: CPTII,S$GLB,, | Performed by: OPHTHALMOLOGY

## 2021-10-18 PROCEDURE — 99999 PR PBB SHADOW E&M-EST. PATIENT-LVL III: ICD-10-PCS | Mod: PBBFAC,,, | Performed by: OPHTHALMOLOGY

## 2021-10-18 PROCEDURE — 1159F MED LIST DOCD IN RCRD: CPT | Mod: CPTII,S$GLB,, | Performed by: OPHTHALMOLOGY

## 2021-10-18 PROCEDURE — 1101F PR PT FALLS ASSESS DOC 0-1 FALLS W/OUT INJ PAST YR: ICD-10-PCS | Mod: CPTII,S$GLB,, | Performed by: OPHTHALMOLOGY

## 2021-10-18 PROCEDURE — 1101F PT FALLS ASSESS-DOCD LE1/YR: CPT | Mod: CPTII,S$GLB,, | Performed by: OPHTHALMOLOGY

## 2021-10-18 PROCEDURE — 99999 PR PBB SHADOW E&M-EST. PATIENT-LVL III: CPT | Mod: PBBFAC,,, | Performed by: OPHTHALMOLOGY

## 2021-10-18 PROCEDURE — 1160F PR REVIEW ALL MEDS BY PRESCRIBER/CLIN PHARMACIST DOCUMENTED: ICD-10-PCS | Mod: CPTII,S$GLB,, | Performed by: OPHTHALMOLOGY

## 2021-10-18 PROCEDURE — 1160F RVW MEDS BY RX/DR IN RCRD: CPT | Mod: CPTII,S$GLB,, | Performed by: OPHTHALMOLOGY

## 2021-10-18 PROCEDURE — 1126F PR PAIN SEVERITY QUANTIFIED, NO PAIN PRESENT: ICD-10-PCS | Mod: CPTII,S$GLB,, | Performed by: OPHTHALMOLOGY

## 2021-10-18 PROCEDURE — 99213 PR OFFICE/OUTPT VISIT, EST, LEVL III, 20-29 MIN: ICD-10-PCS | Mod: S$GLB,,, | Performed by: OPHTHALMOLOGY

## 2021-10-18 PROCEDURE — 92020 PR SPECIAL EYE EVAL,GONIOSCOPY: ICD-10-PCS | Mod: S$GLB,,, | Performed by: OPHTHALMOLOGY

## 2021-10-19 ENCOUNTER — TELEPHONE (OUTPATIENT)
Dept: CARDIOLOGY | Facility: CLINIC | Age: 76
End: 2021-10-19

## 2021-10-19 ENCOUNTER — TELEPHONE (OUTPATIENT)
Dept: OTOLARYNGOLOGY | Facility: CLINIC | Age: 76
End: 2021-10-19

## 2021-10-19 ENCOUNTER — OFFICE VISIT (OUTPATIENT)
Dept: CARDIOLOGY | Facility: CLINIC | Age: 76
End: 2021-10-19
Payer: MEDICARE

## 2021-10-19 ENCOUNTER — CLINICAL SUPPORT (OUTPATIENT)
Dept: REHABILITATION | Facility: HOSPITAL | Age: 76
End: 2021-10-19
Payer: MEDICARE

## 2021-10-19 VITALS
DIASTOLIC BLOOD PRESSURE: 60 MMHG | HEART RATE: 100 BPM | BODY MASS INDEX: 25.83 KG/M2 | HEIGHT: 65 IN | SYSTOLIC BLOOD PRESSURE: 122 MMHG | WEIGHT: 155 LBS | OXYGEN SATURATION: 94 %

## 2021-10-19 DIAGNOSIS — G89.29 CHRONIC MIDLINE LOW BACK PAIN WITHOUT SCIATICA: ICD-10-CM

## 2021-10-19 DIAGNOSIS — E87.6 HYPOKALEMIA: ICD-10-CM

## 2021-10-19 DIAGNOSIS — E53.8 VITAMIN B12 DEFICIENCY: ICD-10-CM

## 2021-10-19 DIAGNOSIS — E89.0 H/O THYROIDECTOMY: ICD-10-CM

## 2021-10-19 DIAGNOSIS — M48.00 SPINAL STENOSIS, UNSPECIFIED SPINAL REGION: ICD-10-CM

## 2021-10-19 DIAGNOSIS — E53.8 FOLATE DEFICIENCY: ICD-10-CM

## 2021-10-19 DIAGNOSIS — C73 THYROID CANCER: ICD-10-CM

## 2021-10-19 DIAGNOSIS — M54.2 PAINFUL CERVICAL RANGE OF MOTION: ICD-10-CM

## 2021-10-19 DIAGNOSIS — M25.612 DECREASED RANGE OF MOTION OF LEFT SHOULDER: ICD-10-CM

## 2021-10-19 DIAGNOSIS — J38.01 UNILATERAL COMPLETE VOCAL FOLD PARALYSIS: Primary | ICD-10-CM

## 2021-10-19 DIAGNOSIS — R29.3 POSTURE ABNORMALITY: ICD-10-CM

## 2021-10-19 DIAGNOSIS — E55.9 VITAMIN D DEFICIENCY: Primary | ICD-10-CM

## 2021-10-19 DIAGNOSIS — I10 HYPERTENSION, UNSPECIFIED TYPE: ICD-10-CM

## 2021-10-19 DIAGNOSIS — R49.0 DYSPHONIA: ICD-10-CM

## 2021-10-19 DIAGNOSIS — M54.50 CHRONIC MIDLINE LOW BACK PAIN WITHOUT SCIATICA: ICD-10-CM

## 2021-10-19 DIAGNOSIS — G62.9 NEUROPATHY: ICD-10-CM

## 2021-10-19 PROCEDURE — 1126F PR PAIN SEVERITY QUANTIFIED, NO PAIN PRESENT: ICD-10-PCS | Mod: CPTII,S$GLB,, | Performed by: SPECIALIST

## 2021-10-19 PROCEDURE — 1126F AMNT PAIN NOTED NONE PRSNT: CPT | Mod: CPTII,S$GLB,, | Performed by: SPECIALIST

## 2021-10-19 PROCEDURE — 1159F PR MEDICATION LIST DOCUMENTED IN MEDICAL RECORD: ICD-10-PCS | Mod: CPTII,S$GLB,, | Performed by: SPECIALIST

## 2021-10-19 PROCEDURE — 97110 THERAPEUTIC EXERCISES: CPT | Mod: KX,PN

## 2021-10-19 PROCEDURE — 1101F PT FALLS ASSESS-DOCD LE1/YR: CPT | Mod: CPTII,S$GLB,, | Performed by: SPECIALIST

## 2021-10-19 PROCEDURE — 1160F RVW MEDS BY RX/DR IN RCRD: CPT | Mod: CPTII,S$GLB,, | Performed by: SPECIALIST

## 2021-10-19 PROCEDURE — 3074F SYST BP LT 130 MM HG: CPT | Mod: CPTII,S$GLB,, | Performed by: SPECIALIST

## 2021-10-19 PROCEDURE — 3288F FALL RISK ASSESSMENT DOCD: CPT | Mod: CPTII,S$GLB,, | Performed by: SPECIALIST

## 2021-10-19 PROCEDURE — 1101F PR PT FALLS ASSESS DOC 0-1 FALLS W/OUT INJ PAST YR: ICD-10-PCS | Mod: CPTII,S$GLB,, | Performed by: SPECIALIST

## 2021-10-19 PROCEDURE — 97140 MANUAL THERAPY 1/> REGIONS: CPT | Mod: KX,PN

## 2021-10-19 PROCEDURE — 3078F PR MOST RECENT DIASTOLIC BLOOD PRESSURE < 80 MM HG: ICD-10-PCS | Mod: CPTII,S$GLB,, | Performed by: SPECIALIST

## 2021-10-19 PROCEDURE — 1159F MED LIST DOCD IN RCRD: CPT | Mod: CPTII,S$GLB,, | Performed by: SPECIALIST

## 2021-10-19 PROCEDURE — 1160F PR REVIEW ALL MEDS BY PRESCRIBER/CLIN PHARMACIST DOCUMENTED: ICD-10-PCS | Mod: CPTII,S$GLB,, | Performed by: SPECIALIST

## 2021-10-19 PROCEDURE — 3078F DIAST BP <80 MM HG: CPT | Mod: CPTII,S$GLB,, | Performed by: SPECIALIST

## 2021-10-19 PROCEDURE — 99215 OFFICE O/P EST HI 40 MIN: CPT | Mod: S$GLB,,, | Performed by: SPECIALIST

## 2021-10-19 PROCEDURE — 3288F PR FALLS RISK ASSESSMENT DOCUMENTED: ICD-10-PCS | Mod: CPTII,S$GLB,, | Performed by: SPECIALIST

## 2021-10-19 PROCEDURE — 3074F PR MOST RECENT SYSTOLIC BLOOD PRESSURE < 130 MM HG: ICD-10-PCS | Mod: CPTII,S$GLB,, | Performed by: SPECIALIST

## 2021-10-19 PROCEDURE — 99215 PR OFFICE/OUTPT VISIT, EST, LEVL V, 40-54 MIN: ICD-10-PCS | Mod: S$GLB,,, | Performed by: SPECIALIST

## 2021-10-19 RX ORDER — GABAPENTIN 300 MG/1
300 CAPSULE ORAL 3 TIMES DAILY
Qty: 90 CAPSULE | Refills: 11 | Status: SHIPPED | OUTPATIENT
Start: 2021-10-19 | End: 2021-12-28 | Stop reason: CLARIF

## 2021-10-19 RX ORDER — POTASSIUM CHLORIDE 750 MG/1
10 TABLET, EXTENDED RELEASE ORAL EVERY OTHER DAY
Qty: 45 TABLET | Refills: 6 | Status: SHIPPED | OUTPATIENT
Start: 2021-10-19 | End: 2021-12-28 | Stop reason: CLARIF

## 2021-10-20 ENCOUNTER — TELEPHONE (OUTPATIENT)
Dept: ENDOCRINOLOGY | Facility: CLINIC | Age: 76
End: 2021-10-20

## 2021-10-20 ENCOUNTER — LAB VISIT (OUTPATIENT)
Dept: LAB | Facility: HOSPITAL | Age: 76
End: 2021-10-20
Attending: INTERNAL MEDICINE
Payer: MEDICARE

## 2021-10-20 ENCOUNTER — OFFICE VISIT (OUTPATIENT)
Dept: FAMILY MEDICINE | Facility: CLINIC | Age: 76
End: 2021-10-20
Attending: FAMILY MEDICINE
Payer: MEDICARE

## 2021-10-20 VITALS
HEIGHT: 65 IN | DIASTOLIC BLOOD PRESSURE: 70 MMHG | HEART RATE: 96 BPM | TEMPERATURE: 98 F | WEIGHT: 154.44 LBS | BODY MASS INDEX: 25.73 KG/M2 | SYSTOLIC BLOOD PRESSURE: 123 MMHG | RESPIRATION RATE: 18 BRPM | OXYGEN SATURATION: 98 %

## 2021-10-20 DIAGNOSIS — E11.00 TYPE 2 DIABETES MELLITUS WITH HYPEROSMOLARITY WITHOUT COMA, WITHOUT LONG-TERM CURRENT USE OF INSULIN: Primary | ICD-10-CM

## 2021-10-20 DIAGNOSIS — I15.2 HYPERTENSION ASSOCIATED WITH DIABETES: ICD-10-CM

## 2021-10-20 DIAGNOSIS — E11.59 HYPERTENSION ASSOCIATED WITH DIABETES: ICD-10-CM

## 2021-10-20 DIAGNOSIS — F41.9 ANXIETY: ICD-10-CM

## 2021-10-20 DIAGNOSIS — E78.5 HYPERLIPIDEMIA ASSOCIATED WITH TYPE 2 DIABETES MELLITUS: ICD-10-CM

## 2021-10-20 DIAGNOSIS — C73 MALIGNANT NEOPLASM OF THYROID GLAND: ICD-10-CM

## 2021-10-20 DIAGNOSIS — C73 THYROID CANCER: ICD-10-CM

## 2021-10-20 DIAGNOSIS — E89.0 POSTOPERATIVE HYPOTHYROIDISM: ICD-10-CM

## 2021-10-20 DIAGNOSIS — E11.69 HYPERLIPIDEMIA ASSOCIATED WITH TYPE 2 DIABETES MELLITUS: ICD-10-CM

## 2021-10-20 LAB
T4 FREE SERPL-MCNC: 1.87 NG/DL (ref 0.71–1.51)
TSH SERPL DL<=0.005 MIU/L-ACNC: <0.01 UIU/ML (ref 0.4–4)

## 2021-10-20 PROCEDURE — 84443 ASSAY THYROID STIM HORMONE: CPT | Performed by: INTERNAL MEDICINE

## 2021-10-20 PROCEDURE — 99213 OFFICE O/P EST LOW 20 MIN: CPT | Mod: 25,S$GLB,, | Performed by: FAMILY MEDICINE

## 2021-10-20 PROCEDURE — G0008 ADMIN INFLUENZA VIRUS VAC: HCPCS | Mod: S$GLB,,, | Performed by: FAMILY MEDICINE

## 2021-10-20 PROCEDURE — 1159F MED LIST DOCD IN RCRD: CPT | Mod: CPTII,S$GLB,, | Performed by: FAMILY MEDICINE

## 2021-10-20 PROCEDURE — 3288F FALL RISK ASSESSMENT DOCD: CPT | Mod: CPTII,S$GLB,, | Performed by: FAMILY MEDICINE

## 2021-10-20 PROCEDURE — 1160F RVW MEDS BY RX/DR IN RCRD: CPT | Mod: CPTII,S$GLB,, | Performed by: FAMILY MEDICINE

## 2021-10-20 PROCEDURE — 3078F PR MOST RECENT DIASTOLIC BLOOD PRESSURE < 80 MM HG: ICD-10-PCS | Mod: CPTII,S$GLB,, | Performed by: FAMILY MEDICINE

## 2021-10-20 PROCEDURE — 1160F PR REVIEW ALL MEDS BY PRESCRIBER/CLIN PHARMACIST DOCUMENTED: ICD-10-PCS | Mod: CPTII,S$GLB,, | Performed by: FAMILY MEDICINE

## 2021-10-20 PROCEDURE — 3288F PR FALLS RISK ASSESSMENT DOCUMENTED: ICD-10-PCS | Mod: CPTII,S$GLB,, | Performed by: FAMILY MEDICINE

## 2021-10-20 PROCEDURE — 84439 ASSAY OF FREE THYROXINE: CPT | Performed by: INTERNAL MEDICINE

## 2021-10-20 PROCEDURE — 3074F SYST BP LT 130 MM HG: CPT | Mod: CPTII,S$GLB,, | Performed by: FAMILY MEDICINE

## 2021-10-20 PROCEDURE — 99499 UNLISTED E&M SERVICE: CPT | Mod: S$GLB,,, | Performed by: FAMILY MEDICINE

## 2021-10-20 PROCEDURE — 99499 RISK ADDL DX/OHS AUDIT: ICD-10-PCS | Mod: S$GLB,,, | Performed by: FAMILY MEDICINE

## 2021-10-20 PROCEDURE — G0008 FLU VACCINE - QUADRIVALENT - ADJUVANTED: ICD-10-PCS | Mod: S$GLB,,, | Performed by: FAMILY MEDICINE

## 2021-10-20 PROCEDURE — 1125F PR PAIN SEVERITY QUANTIFIED, PAIN PRESENT: ICD-10-PCS | Mod: CPTII,S$GLB,, | Performed by: FAMILY MEDICINE

## 2021-10-20 PROCEDURE — 84432 ASSAY OF THYROGLOBULIN: CPT | Performed by: INTERNAL MEDICINE

## 2021-10-20 PROCEDURE — 3078F DIAST BP <80 MM HG: CPT | Mod: CPTII,S$GLB,, | Performed by: FAMILY MEDICINE

## 2021-10-20 PROCEDURE — 3074F PR MOST RECENT SYSTOLIC BLOOD PRESSURE < 130 MM HG: ICD-10-PCS | Mod: CPTII,S$GLB,, | Performed by: FAMILY MEDICINE

## 2021-10-20 PROCEDURE — 1101F PT FALLS ASSESS-DOCD LE1/YR: CPT | Mod: CPTII,S$GLB,, | Performed by: FAMILY MEDICINE

## 2021-10-20 PROCEDURE — 99213 PR OFFICE/OUTPT VISIT, EST, LEVL III, 20-29 MIN: ICD-10-PCS | Mod: 25,S$GLB,, | Performed by: FAMILY MEDICINE

## 2021-10-20 PROCEDURE — 90694 FLU VACCINE - QUADRIVALENT - ADJUVANTED: ICD-10-PCS | Mod: S$GLB,,, | Performed by: FAMILY MEDICINE

## 2021-10-20 PROCEDURE — 36415 COLL VENOUS BLD VENIPUNCTURE: CPT | Mod: PO | Performed by: INTERNAL MEDICINE

## 2021-10-20 PROCEDURE — 99999 PR PBB SHADOW E&M-EST. PATIENT-LVL V: CPT | Mod: PBBFAC,,, | Performed by: FAMILY MEDICINE

## 2021-10-20 PROCEDURE — 1125F AMNT PAIN NOTED PAIN PRSNT: CPT | Mod: CPTII,S$GLB,, | Performed by: FAMILY MEDICINE

## 2021-10-20 PROCEDURE — 1159F PR MEDICATION LIST DOCUMENTED IN MEDICAL RECORD: ICD-10-PCS | Mod: CPTII,S$GLB,, | Performed by: FAMILY MEDICINE

## 2021-10-20 PROCEDURE — 90694 VACC AIIV4 NO PRSRV 0.5ML IM: CPT | Mod: S$GLB,,, | Performed by: FAMILY MEDICINE

## 2021-10-20 PROCEDURE — 1101F PR PT FALLS ASSESS DOC 0-1 FALLS W/OUT INJ PAST YR: ICD-10-PCS | Mod: CPTII,S$GLB,, | Performed by: FAMILY MEDICINE

## 2021-10-20 PROCEDURE — 99999 PR PBB SHADOW E&M-EST. PATIENT-LVL V: ICD-10-PCS | Mod: PBBFAC,,, | Performed by: FAMILY MEDICINE

## 2021-10-20 RX ORDER — TIZANIDINE 4 MG/1
4 TABLET ORAL EVERY 6 HOURS PRN
COMMUNITY
End: 2021-12-28 | Stop reason: CLARIF

## 2021-10-20 RX ORDER — DOXEPIN HYDROCHLORIDE 10 MG/1
20 CAPSULE ORAL NIGHTLY
COMMUNITY
End: 2022-05-05 | Stop reason: SDUPTHER

## 2021-10-21 ENCOUNTER — TELEPHONE (OUTPATIENT)
Dept: ENDOCRINOLOGY | Facility: CLINIC | Age: 76
End: 2021-10-21

## 2021-10-22 ENCOUNTER — CLINICAL SUPPORT (OUTPATIENT)
Dept: REHABILITATION | Facility: HOSPITAL | Age: 76
End: 2021-10-22
Payer: MEDICARE

## 2021-10-22 DIAGNOSIS — M54.2 PAINFUL CERVICAL RANGE OF MOTION: ICD-10-CM

## 2021-10-22 DIAGNOSIS — M25.612 DECREASED RANGE OF MOTION OF LEFT SHOULDER: ICD-10-CM

## 2021-10-22 DIAGNOSIS — R29.3 POSTURE ABNORMALITY: ICD-10-CM

## 2021-10-22 LAB
THRYOGLOBULIN INTERPRETATION: ABNORMAL
THYROGLOB AB SERPL-ACNC: <1.8 IU/ML
THYROGLOB SERPL-MCNC: 9 NG/ML

## 2021-10-22 PROCEDURE — 97110 THERAPEUTIC EXERCISES: CPT | Mod: PN,CQ

## 2021-10-25 ENCOUNTER — PATIENT MESSAGE (OUTPATIENT)
Dept: ENDOCRINOLOGY | Facility: CLINIC | Age: 76
End: 2021-10-25
Payer: MEDICARE

## 2021-10-25 DIAGNOSIS — C73 MALIGNANT NEOPLASM OF THYROID GLAND: Primary | ICD-10-CM

## 2021-10-26 ENCOUNTER — CLINICAL SUPPORT (OUTPATIENT)
Dept: REHABILITATION | Facility: HOSPITAL | Age: 76
End: 2021-10-26
Payer: MEDICARE

## 2021-10-26 ENCOUNTER — TELEPHONE (OUTPATIENT)
Dept: ENDOCRINOLOGY | Facility: CLINIC | Age: 76
End: 2021-10-26
Payer: MEDICARE

## 2021-10-26 DIAGNOSIS — M54.2 PAINFUL CERVICAL RANGE OF MOTION: ICD-10-CM

## 2021-10-26 DIAGNOSIS — R29.3 POSTURE ABNORMALITY: ICD-10-CM

## 2021-10-26 DIAGNOSIS — M25.612 DECREASED RANGE OF MOTION OF LEFT SHOULDER: ICD-10-CM

## 2021-10-26 PROCEDURE — 97110 THERAPEUTIC EXERCISES: CPT | Mod: PN

## 2021-10-26 PROCEDURE — 97140 MANUAL THERAPY 1/> REGIONS: CPT | Mod: PN

## 2021-10-27 ENCOUNTER — TELEPHONE (OUTPATIENT)
Dept: ENDOCRINOLOGY | Facility: CLINIC | Age: 76
End: 2021-10-27
Payer: MEDICARE

## 2021-10-29 ENCOUNTER — TELEPHONE (OUTPATIENT)
Dept: ENDOCRINOLOGY | Facility: CLINIC | Age: 76
End: 2021-10-29
Payer: MEDICARE

## 2021-11-02 ENCOUNTER — TELEPHONE (OUTPATIENT)
Dept: ENDOCRINOLOGY | Facility: CLINIC | Age: 76
End: 2021-11-02
Payer: MEDICARE

## 2021-11-03 ENCOUNTER — TELEPHONE (OUTPATIENT)
Dept: FAMILY MEDICINE | Facility: CLINIC | Age: 76
End: 2021-11-03
Payer: MEDICARE

## 2021-11-04 ENCOUNTER — CLINICAL SUPPORT (OUTPATIENT)
Dept: REHABILITATION | Facility: HOSPITAL | Age: 76
End: 2021-11-04
Payer: MEDICARE

## 2021-11-04 DIAGNOSIS — M25.612 DECREASED RANGE OF MOTION OF LEFT SHOULDER: ICD-10-CM

## 2021-11-04 DIAGNOSIS — M54.2 PAINFUL CERVICAL RANGE OF MOTION: ICD-10-CM

## 2021-11-04 DIAGNOSIS — R29.3 POSTURE ABNORMALITY: ICD-10-CM

## 2021-11-04 PROCEDURE — 97110 THERAPEUTIC EXERCISES: CPT | Mod: KX,PN

## 2021-11-04 PROCEDURE — 97140 MANUAL THERAPY 1/> REGIONS: CPT | Mod: KX,PN

## 2021-11-08 ENCOUNTER — TELEPHONE (OUTPATIENT)
Dept: OTOLARYNGOLOGY | Facility: CLINIC | Age: 76
End: 2021-11-08
Payer: MEDICARE

## 2021-11-08 ENCOUNTER — TELEPHONE (OUTPATIENT)
Dept: ENDOCRINOLOGY | Facility: CLINIC | Age: 76
End: 2021-11-08
Payer: MEDICARE

## 2021-11-11 ENCOUNTER — CLINICAL SUPPORT (OUTPATIENT)
Dept: REHABILITATION | Facility: HOSPITAL | Age: 76
End: 2021-11-11
Payer: MEDICARE

## 2021-11-11 DIAGNOSIS — M54.2 PAINFUL CERVICAL RANGE OF MOTION: ICD-10-CM

## 2021-11-11 DIAGNOSIS — M25.612 DECREASED RANGE OF MOTION OF LEFT SHOULDER: ICD-10-CM

## 2021-11-11 DIAGNOSIS — R29.3 POSTURE ABNORMALITY: ICD-10-CM

## 2021-11-11 PROCEDURE — 97110 THERAPEUTIC EXERCISES: CPT | Mod: KX,PN

## 2021-11-12 ENCOUNTER — OFFICE VISIT (OUTPATIENT)
Dept: OTOLARYNGOLOGY | Facility: CLINIC | Age: 76
End: 2021-11-12
Payer: MEDICARE

## 2021-11-12 VITALS — DIASTOLIC BLOOD PRESSURE: 72 MMHG | SYSTOLIC BLOOD PRESSURE: 128 MMHG | HEART RATE: 85 BPM

## 2021-11-12 DIAGNOSIS — J38.5 LARYNGEAL SPASM: ICD-10-CM

## 2021-11-12 DIAGNOSIS — R49.0 DYSPHONIA: Primary | ICD-10-CM

## 2021-11-12 DIAGNOSIS — R29.898 NECK TIGHTNESS: ICD-10-CM

## 2021-11-12 DIAGNOSIS — M62.89 LARYNGEAL MUSCLE TENSION DISORDER: ICD-10-CM

## 2021-11-12 DIAGNOSIS — J38.01 UNILATERAL COMPLETE VOCAL FOLD PARALYSIS: ICD-10-CM

## 2021-11-12 PROCEDURE — 31579 LARYNGOSCOPY TELESCOPIC: CPT | Mod: 58,S$GLB,, | Performed by: OTOLARYNGOLOGY

## 2021-11-12 PROCEDURE — 3074F PR MOST RECENT SYSTOLIC BLOOD PRESSURE < 130 MM HG: ICD-10-PCS | Mod: CPTII,S$GLB,, | Performed by: OTOLARYNGOLOGY

## 2021-11-12 PROCEDURE — 99024 POSTOP FOLLOW-UP VISIT: CPT | Mod: S$GLB,,, | Performed by: OTOLARYNGOLOGY

## 2021-11-12 PROCEDURE — 99999 PR PBB SHADOW E&M-EST. PATIENT-LVL V: CPT | Mod: PBBFAC,,, | Performed by: OTOLARYNGOLOGY

## 2021-11-12 PROCEDURE — 99024 PR POST-OP FOLLOW-UP VISIT: ICD-10-PCS | Mod: S$GLB,,, | Performed by: OTOLARYNGOLOGY

## 2021-11-12 PROCEDURE — 1160F PR REVIEW ALL MEDS BY PRESCRIBER/CLIN PHARMACIST DOCUMENTED: ICD-10-PCS | Mod: CPTII,S$GLB,, | Performed by: OTOLARYNGOLOGY

## 2021-11-12 PROCEDURE — 31579 PR LARYNGOSCOPY, FLEX/RIGID TELESCOPIC, W/STROBOSCOPY: ICD-10-PCS | Mod: 58,S$GLB,, | Performed by: OTOLARYNGOLOGY

## 2021-11-12 PROCEDURE — 1126F PR PAIN SEVERITY QUANTIFIED, NO PAIN PRESENT: ICD-10-PCS | Mod: CPTII,S$GLB,, | Performed by: OTOLARYNGOLOGY

## 2021-11-12 PROCEDURE — 3078F PR MOST RECENT DIASTOLIC BLOOD PRESSURE < 80 MM HG: ICD-10-PCS | Mod: CPTII,S$GLB,, | Performed by: OTOLARYNGOLOGY

## 2021-11-12 PROCEDURE — 3078F DIAST BP <80 MM HG: CPT | Mod: CPTII,S$GLB,, | Performed by: OTOLARYNGOLOGY

## 2021-11-12 PROCEDURE — 99999 PR PBB SHADOW E&M-EST. PATIENT-LVL V: ICD-10-PCS | Mod: PBBFAC,,, | Performed by: OTOLARYNGOLOGY

## 2021-11-12 PROCEDURE — 1101F PR PT FALLS ASSESS DOC 0-1 FALLS W/OUT INJ PAST YR: ICD-10-PCS | Mod: CPTII,S$GLB,, | Performed by: OTOLARYNGOLOGY

## 2021-11-12 PROCEDURE — 3288F FALL RISK ASSESSMENT DOCD: CPT | Mod: CPTII,S$GLB,, | Performed by: OTOLARYNGOLOGY

## 2021-11-12 PROCEDURE — 1159F PR MEDICATION LIST DOCUMENTED IN MEDICAL RECORD: ICD-10-PCS | Mod: CPTII,S$GLB,, | Performed by: OTOLARYNGOLOGY

## 2021-11-12 PROCEDURE — 1126F AMNT PAIN NOTED NONE PRSNT: CPT | Mod: CPTII,S$GLB,, | Performed by: OTOLARYNGOLOGY

## 2021-11-12 PROCEDURE — 1101F PT FALLS ASSESS-DOCD LE1/YR: CPT | Mod: CPTII,S$GLB,, | Performed by: OTOLARYNGOLOGY

## 2021-11-12 PROCEDURE — 1160F RVW MEDS BY RX/DR IN RCRD: CPT | Mod: CPTII,S$GLB,, | Performed by: OTOLARYNGOLOGY

## 2021-11-12 PROCEDURE — 3288F PR FALLS RISK ASSESSMENT DOCUMENTED: ICD-10-PCS | Mod: CPTII,S$GLB,, | Performed by: OTOLARYNGOLOGY

## 2021-11-12 PROCEDURE — 1159F MED LIST DOCD IN RCRD: CPT | Mod: CPTII,S$GLB,, | Performed by: OTOLARYNGOLOGY

## 2021-11-12 PROCEDURE — 3074F SYST BP LT 130 MM HG: CPT | Mod: CPTII,S$GLB,, | Performed by: OTOLARYNGOLOGY

## 2021-11-15 PROBLEM — G89.29 CHRONIC CERVICAL PAIN: Status: RESOLVED | Noted: 2021-08-10 | Resolved: 2021-11-15

## 2021-11-15 PROBLEM — M54.2 CHRONIC CERVICAL PAIN: Status: RESOLVED | Noted: 2021-08-10 | Resolved: 2021-11-15

## 2021-11-15 PROBLEM — M25.562 BILATERAL CHRONIC KNEE PAIN: Status: RESOLVED | Noted: 2021-08-10 | Resolved: 2021-11-15

## 2021-11-15 PROBLEM — M54.50 LUMBAR PAIN: Status: RESOLVED | Noted: 2021-08-10 | Resolved: 2021-11-15

## 2021-11-15 PROBLEM — M25.561 BILATERAL CHRONIC KNEE PAIN: Status: RESOLVED | Noted: 2021-08-10 | Resolved: 2021-11-15

## 2021-11-15 PROBLEM — G89.29 BILATERAL CHRONIC KNEE PAIN: Status: RESOLVED | Noted: 2021-08-10 | Resolved: 2021-11-15

## 2021-11-16 ENCOUNTER — CLINICAL SUPPORT (OUTPATIENT)
Dept: REHABILITATION | Facility: HOSPITAL | Age: 76
End: 2021-11-16
Payer: MEDICARE

## 2021-11-16 DIAGNOSIS — E11.59 HYPERTENSION ASSOCIATED WITH DIABETES: ICD-10-CM

## 2021-11-16 DIAGNOSIS — R29.3 POSTURE ABNORMALITY: ICD-10-CM

## 2021-11-16 DIAGNOSIS — I15.2 HYPERTENSION ASSOCIATED WITH DIABETES: ICD-10-CM

## 2021-11-16 DIAGNOSIS — M54.2 PAINFUL CERVICAL RANGE OF MOTION: ICD-10-CM

## 2021-11-16 DIAGNOSIS — M25.612 DECREASED RANGE OF MOTION OF LEFT SHOULDER: ICD-10-CM

## 2021-11-16 PROCEDURE — 97110 THERAPEUTIC EXERCISES: CPT | Mod: PN

## 2021-11-16 RX ORDER — AMLODIPINE BESYLATE 2.5 MG/1
2.5 TABLET ORAL DAILY
Qty: 90 TABLET | Refills: 3 | Status: SHIPPED | OUTPATIENT
Start: 2021-11-16 | End: 2021-11-22

## 2021-11-18 ENCOUNTER — TELEPHONE (OUTPATIENT)
Dept: SPEECH THERAPY | Facility: HOSPITAL | Age: 76
End: 2021-11-18
Payer: MEDICARE

## 2021-11-18 ENCOUNTER — PES CALL (OUTPATIENT)
Dept: ADMINISTRATIVE | Facility: CLINIC | Age: 76
End: 2021-11-18
Payer: MEDICARE

## 2021-11-18 ENCOUNTER — OFFICE VISIT (OUTPATIENT)
Dept: OTOLARYNGOLOGY | Facility: CLINIC | Age: 76
End: 2021-11-18
Payer: MEDICARE

## 2021-11-18 ENCOUNTER — CLINICAL SUPPORT (OUTPATIENT)
Dept: SPEECH THERAPY | Facility: HOSPITAL | Age: 76
End: 2021-11-18
Attending: OTOLARYNGOLOGY
Payer: MEDICARE

## 2021-11-18 VITALS
BODY MASS INDEX: 25.01 KG/M2 | WEIGHT: 151.44 LBS | DIASTOLIC BLOOD PRESSURE: 73 MMHG | HEART RATE: 93 BPM | SYSTOLIC BLOOD PRESSURE: 130 MMHG

## 2021-11-18 DIAGNOSIS — J38.01 UNILATERAL COMPLETE VOCAL FOLD PARALYSIS: ICD-10-CM

## 2021-11-18 DIAGNOSIS — M62.9 MUSCULOSKELETAL DISORDER INVOLVING UPPER TRAPEZIUS MUSCLE: ICD-10-CM

## 2021-11-18 DIAGNOSIS — R49.0 DYSPHONIA: Primary | ICD-10-CM

## 2021-11-18 DIAGNOSIS — R29.898 NECK TIGHTNESS: ICD-10-CM

## 2021-11-18 DIAGNOSIS — C73 THYROID CANCER: ICD-10-CM

## 2021-11-18 DIAGNOSIS — M62.89 LARYNGEAL MUSCLE TENSION DISORDER: ICD-10-CM

## 2021-11-18 PROCEDURE — 1101F PR PT FALLS ASSESS DOC 0-1 FALLS W/OUT INJ PAST YR: ICD-10-PCS | Mod: CPTII,S$GLB,, | Performed by: OTOLARYNGOLOGY

## 2021-11-18 PROCEDURE — 92524 BEHAVRAL QUALIT ANALYS VOICE: CPT | Mod: GN

## 2021-11-18 PROCEDURE — 3288F PR FALLS RISK ASSESSMENT DOCUMENTED: ICD-10-PCS | Mod: CPTII,S$GLB,, | Performed by: OTOLARYNGOLOGY

## 2021-11-18 PROCEDURE — 3075F PR MOST RECENT SYSTOLIC BLOOD PRESS GE 130-139MM HG: ICD-10-PCS | Mod: CPTII,S$GLB,, | Performed by: OTOLARYNGOLOGY

## 2021-11-18 PROCEDURE — 99999 PR PBB SHADOW E&M-EST. PATIENT-LVL IV: ICD-10-PCS | Mod: PBBFAC,,, | Performed by: OTOLARYNGOLOGY

## 2021-11-18 PROCEDURE — 1159F MED LIST DOCD IN RCRD: CPT | Mod: CPTII,S$GLB,, | Performed by: OTOLARYNGOLOGY

## 2021-11-18 PROCEDURE — 3075F SYST BP GE 130 - 139MM HG: CPT | Mod: CPTII,S$GLB,, | Performed by: OTOLARYNGOLOGY

## 2021-11-18 PROCEDURE — 3288F FALL RISK ASSESSMENT DOCD: CPT | Mod: CPTII,S$GLB,, | Performed by: OTOLARYNGOLOGY

## 2021-11-18 PROCEDURE — 99024 PR POST-OP FOLLOW-UP VISIT: ICD-10-PCS | Mod: S$GLB,,, | Performed by: OTOLARYNGOLOGY

## 2021-11-18 PROCEDURE — 1159F PR MEDICATION LIST DOCUMENTED IN MEDICAL RECORD: ICD-10-PCS | Mod: CPTII,S$GLB,, | Performed by: OTOLARYNGOLOGY

## 2021-11-18 PROCEDURE — 1126F PR PAIN SEVERITY QUANTIFIED, NO PAIN PRESENT: ICD-10-PCS | Mod: CPTII,S$GLB,, | Performed by: OTOLARYNGOLOGY

## 2021-11-18 PROCEDURE — 3078F PR MOST RECENT DIASTOLIC BLOOD PRESSURE < 80 MM HG: ICD-10-PCS | Mod: CPTII,S$GLB,, | Performed by: OTOLARYNGOLOGY

## 2021-11-18 PROCEDURE — 1126F AMNT PAIN NOTED NONE PRSNT: CPT | Mod: CPTII,S$GLB,, | Performed by: OTOLARYNGOLOGY

## 2021-11-18 PROCEDURE — 3078F DIAST BP <80 MM HG: CPT | Mod: CPTII,S$GLB,, | Performed by: OTOLARYNGOLOGY

## 2021-11-18 PROCEDURE — 99999 PR PBB SHADOW E&M-EST. PATIENT-LVL IV: CPT | Mod: PBBFAC,,, | Performed by: OTOLARYNGOLOGY

## 2021-11-18 PROCEDURE — 1101F PT FALLS ASSESS-DOCD LE1/YR: CPT | Mod: CPTII,S$GLB,, | Performed by: OTOLARYNGOLOGY

## 2021-11-18 PROCEDURE — 99024 POSTOP FOLLOW-UP VISIT: CPT | Mod: S$GLB,,, | Performed by: OTOLARYNGOLOGY

## 2021-11-19 ENCOUNTER — TELEPHONE (OUTPATIENT)
Dept: ENDOCRINOLOGY | Facility: CLINIC | Age: 76
End: 2021-11-19
Payer: MEDICARE

## 2021-11-19 ENCOUNTER — TELEPHONE (OUTPATIENT)
Dept: FAMILY MEDICINE | Facility: CLINIC | Age: 76
End: 2021-11-19
Payer: MEDICARE

## 2021-11-19 ENCOUNTER — CLINICAL SUPPORT (OUTPATIENT)
Dept: REHABILITATION | Facility: HOSPITAL | Age: 76
End: 2021-11-19
Payer: MEDICARE

## 2021-11-19 DIAGNOSIS — R29.3 POSTURE ABNORMALITY: ICD-10-CM

## 2021-11-19 DIAGNOSIS — M25.612 DECREASED RANGE OF MOTION OF LEFT SHOULDER: ICD-10-CM

## 2021-11-19 DIAGNOSIS — M54.2 PAINFUL CERVICAL RANGE OF MOTION: ICD-10-CM

## 2021-11-19 PROCEDURE — 97140 MANUAL THERAPY 1/> REGIONS: CPT | Mod: KX,PN

## 2021-11-19 PROCEDURE — 97110 THERAPEUTIC EXERCISES: CPT | Mod: KX,PN

## 2021-11-22 ENCOUNTER — OFFICE VISIT (OUTPATIENT)
Dept: RADIOLOGY | Facility: CLINIC | Age: 76
End: 2021-11-22
Payer: MEDICARE

## 2021-11-22 ENCOUNTER — CLINICAL SUPPORT (OUTPATIENT)
Dept: REHABILITATION | Facility: HOSPITAL | Age: 76
End: 2021-11-22
Payer: MEDICARE

## 2021-11-22 DIAGNOSIS — C73 THYROID CANCER: Primary | ICD-10-CM

## 2021-11-22 DIAGNOSIS — C79.9 METASTASIS FROM THYROID CANCER: ICD-10-CM

## 2021-11-22 DIAGNOSIS — M25.612 DECREASED RANGE OF MOTION OF LEFT SHOULDER: ICD-10-CM

## 2021-11-22 DIAGNOSIS — C73 METASTASIS FROM THYROID CANCER: ICD-10-CM

## 2021-11-22 DIAGNOSIS — M54.2 PAINFUL CERVICAL RANGE OF MOTION: ICD-10-CM

## 2021-11-22 DIAGNOSIS — R29.3 POSTURE ABNORMALITY: ICD-10-CM

## 2021-11-22 PROCEDURE — 99499 RISK ADDL DX/OHS AUDIT: ICD-10-PCS | Mod: 95,,, | Performed by: RADIOLOGY

## 2021-11-22 PROCEDURE — 99499 UNLISTED E&M SERVICE: CPT | Mod: 95,XS,, | Performed by: RADIOLOGY

## 2021-11-22 PROCEDURE — 97110 THERAPEUTIC EXERCISES: CPT | Mod: PN

## 2021-11-22 PROCEDURE — 99203 PR OFFICE/OUTPT VISIT, NEW, LEVL III, 30-44 MIN: ICD-10-PCS | Mod: 95,,, | Performed by: RADIOLOGY

## 2021-11-22 PROCEDURE — 97140 MANUAL THERAPY 1/> REGIONS: CPT | Mod: PN

## 2021-11-22 PROCEDURE — 99203 OFFICE O/P NEW LOW 30 MIN: CPT | Mod: 95,,, | Performed by: RADIOLOGY

## 2021-11-22 RX ORDER — ONDANSETRON 4 MG/1
TABLET, ORALLY DISINTEGRATING ORAL
Qty: 1 TABLET | Refills: 0 | Status: SHIPPED | OUTPATIENT
Start: 2021-11-22 | End: 2021-12-28 | Stop reason: CLARIF

## 2021-11-24 ENCOUNTER — CLINICAL SUPPORT (OUTPATIENT)
Dept: REHABILITATION | Facility: HOSPITAL | Age: 76
End: 2021-11-24
Payer: MEDICARE

## 2021-11-24 DIAGNOSIS — M25.612 DECREASED RANGE OF MOTION OF LEFT SHOULDER: ICD-10-CM

## 2021-11-24 DIAGNOSIS — M54.2 PAINFUL CERVICAL RANGE OF MOTION: ICD-10-CM

## 2021-11-24 DIAGNOSIS — R29.3 POSTURE ABNORMALITY: ICD-10-CM

## 2021-11-24 PROCEDURE — 97110 THERAPEUTIC EXERCISES: CPT | Mod: KX,PN

## 2021-11-26 ENCOUNTER — TELEPHONE (OUTPATIENT)
Dept: OTOLARYNGOLOGY | Facility: CLINIC | Age: 76
End: 2021-11-26
Payer: MEDICARE

## 2021-11-29 ENCOUNTER — TELEPHONE (OUTPATIENT)
Dept: CARDIOLOGY | Facility: CLINIC | Age: 76
End: 2021-11-29
Payer: MEDICARE

## 2021-11-29 ENCOUNTER — CLINICAL SUPPORT (OUTPATIENT)
Dept: REHABILITATION | Facility: HOSPITAL | Age: 76
End: 2021-11-29
Payer: MEDICARE

## 2021-11-29 DIAGNOSIS — R29.3 POSTURE ABNORMALITY: ICD-10-CM

## 2021-11-29 DIAGNOSIS — M54.2 PAINFUL CERVICAL RANGE OF MOTION: ICD-10-CM

## 2021-11-29 DIAGNOSIS — M25.612 DECREASED RANGE OF MOTION OF LEFT SHOULDER: ICD-10-CM

## 2021-11-29 PROCEDURE — 97140 MANUAL THERAPY 1/> REGIONS: CPT | Mod: KX,PN

## 2021-11-29 PROCEDURE — 97110 THERAPEUTIC EXERCISES: CPT | Mod: KX,PN

## 2021-12-01 ENCOUNTER — CLINICAL SUPPORT (OUTPATIENT)
Dept: REHABILITATION | Facility: HOSPITAL | Age: 76
End: 2021-12-01
Payer: MEDICARE

## 2021-12-01 DIAGNOSIS — R29.3 POSTURE ABNORMALITY: ICD-10-CM

## 2021-12-01 DIAGNOSIS — M54.2 PAINFUL CERVICAL RANGE OF MOTION: ICD-10-CM

## 2021-12-01 DIAGNOSIS — M25.612 DECREASED RANGE OF MOTION OF LEFT SHOULDER: ICD-10-CM

## 2021-12-01 PROCEDURE — 97110 THERAPEUTIC EXERCISES: CPT | Mod: PN

## 2021-12-01 PROCEDURE — 97140 MANUAL THERAPY 1/> REGIONS: CPT | Mod: PN

## 2021-12-02 ENCOUNTER — TELEPHONE (OUTPATIENT)
Dept: FAMILY MEDICINE | Facility: CLINIC | Age: 76
End: 2021-12-02
Payer: MEDICARE

## 2021-12-02 ENCOUNTER — PATIENT OUTREACH (OUTPATIENT)
Dept: ADMINISTRATIVE | Facility: OTHER | Age: 76
End: 2021-12-02
Payer: MEDICARE

## 2021-12-02 ENCOUNTER — OFFICE VISIT (OUTPATIENT)
Dept: ORTHOPEDICS | Facility: CLINIC | Age: 76
End: 2021-12-02
Payer: MEDICARE

## 2021-12-02 ENCOUNTER — HOSPITAL ENCOUNTER (OUTPATIENT)
Dept: RADIOLOGY | Facility: HOSPITAL | Age: 76
Discharge: HOME | End: 2021-12-02
Attending: ORTHOPAEDIC SURGERY
Payer: MEDICARE

## 2021-12-02 VITALS — HEIGHT: 65 IN | RESPIRATION RATE: 16 BRPM | WEIGHT: 151 LBS | BODY MASS INDEX: 25.16 KG/M2

## 2021-12-02 DIAGNOSIS — M25.512 LEFT SHOULDER PAIN, UNSPECIFIED CHRONICITY: Primary | ICD-10-CM

## 2021-12-02 DIAGNOSIS — M75.100 ROTATOR CUFF SYNDROME, UNSPECIFIED LATERALITY: Primary | ICD-10-CM

## 2021-12-02 DIAGNOSIS — M25.512 LEFT SHOULDER PAIN, UNSPECIFIED CHRONICITY: ICD-10-CM

## 2021-12-02 PROCEDURE — 73030 X-RAY EXAM OF SHOULDER: CPT | Mod: 26,LT,, | Performed by: RADIOLOGY

## 2021-12-02 PROCEDURE — 99213 OFFICE O/P EST LOW 20 MIN: CPT | Mod: 25,S$GLB,, | Performed by: ORTHOPAEDIC SURGERY

## 2021-12-02 PROCEDURE — 20610 DRAIN/INJ JOINT/BURSA W/O US: CPT | Mod: LT,S$GLB,, | Performed by: ORTHOPAEDIC SURGERY

## 2021-12-02 PROCEDURE — 99213 PR OFFICE/OUTPT VISIT, EST, LEVL III, 20-29 MIN: ICD-10-PCS | Mod: 25,S$GLB,, | Performed by: ORTHOPAEDIC SURGERY

## 2021-12-02 PROCEDURE — 73030 XR SHOULDER TRAUMA 3 VIEW LEFT: ICD-10-PCS | Mod: 26,LT,, | Performed by: RADIOLOGY

## 2021-12-02 PROCEDURE — 99999 PR PBB SHADOW E&M-EST. PATIENT-LVL IV: ICD-10-PCS | Mod: PBBFAC,,, | Performed by: ORTHOPAEDIC SURGERY

## 2021-12-02 PROCEDURE — 73030 X-RAY EXAM OF SHOULDER: CPT | Mod: TC,PN,LT

## 2021-12-02 PROCEDURE — 20610 LARGE JOINT ASPIRATION/INJECTION: L SUBACROMIAL BURSA: ICD-10-PCS | Mod: LT,S$GLB,, | Performed by: ORTHOPAEDIC SURGERY

## 2021-12-02 PROCEDURE — 99999 PR PBB SHADOW E&M-EST. PATIENT-LVL IV: CPT | Mod: PBBFAC,,, | Performed by: ORTHOPAEDIC SURGERY

## 2021-12-02 RX ORDER — TRIAMCINOLONE ACETONIDE 40 MG/ML
40 INJECTION, SUSPENSION INTRA-ARTICULAR; INTRAMUSCULAR
Status: DISCONTINUED | OUTPATIENT
Start: 2021-12-02 | End: 2021-12-02 | Stop reason: HOSPADM

## 2021-12-02 RX ADMIN — TRIAMCINOLONE ACETONIDE 40 MG: 40 INJECTION, SUSPENSION INTRA-ARTICULAR; INTRAMUSCULAR at 11:12

## 2021-12-06 ENCOUNTER — CLINICAL SUPPORT (OUTPATIENT)
Dept: REHABILITATION | Facility: HOSPITAL | Age: 76
End: 2021-12-06
Payer: MEDICARE

## 2021-12-06 DIAGNOSIS — M25.612 DECREASED RANGE OF MOTION OF LEFT SHOULDER: ICD-10-CM

## 2021-12-06 DIAGNOSIS — M54.2 PAINFUL CERVICAL RANGE OF MOTION: ICD-10-CM

## 2021-12-06 DIAGNOSIS — R29.3 POSTURE ABNORMALITY: ICD-10-CM

## 2021-12-06 PROCEDURE — 97110 THERAPEUTIC EXERCISES: CPT | Mod: PN,CQ

## 2021-12-07 ENCOUNTER — TELEPHONE (OUTPATIENT)
Dept: ORTHOPEDICS | Facility: CLINIC | Age: 76
End: 2021-12-07
Payer: MEDICARE

## 2021-12-08 ENCOUNTER — TELEPHONE (OUTPATIENT)
Dept: ORTHOPEDICS | Facility: CLINIC | Age: 76
End: 2021-12-08
Payer: MEDICARE

## 2021-12-08 ENCOUNTER — OFFICE VISIT (OUTPATIENT)
Dept: ORTHOPEDICS | Facility: CLINIC | Age: 76
End: 2021-12-08
Payer: MEDICARE

## 2021-12-08 VITALS — HEIGHT: 65 IN | WEIGHT: 151 LBS | RESPIRATION RATE: 16 BRPM | BODY MASS INDEX: 25.16 KG/M2

## 2021-12-08 DIAGNOSIS — M17.10 ARTHRITIS OF KNEE: Primary | ICD-10-CM

## 2021-12-08 DIAGNOSIS — M25.512 LEFT SHOULDER PAIN, UNSPECIFIED CHRONICITY: ICD-10-CM

## 2021-12-08 DIAGNOSIS — M79.2 NERVE PAIN: ICD-10-CM

## 2021-12-08 PROCEDURE — 99213 OFFICE O/P EST LOW 20 MIN: CPT | Mod: 25,S$GLB,, | Performed by: ORTHOPAEDIC SURGERY

## 2021-12-08 PROCEDURE — 99213 PR OFFICE/OUTPT VISIT, EST, LEVL III, 20-29 MIN: ICD-10-PCS | Mod: 25,S$GLB,, | Performed by: ORTHOPAEDIC SURGERY

## 2021-12-08 PROCEDURE — 99999 PR PBB SHADOW E&M-EST. PATIENT-LVL IV: CPT | Mod: PBBFAC,,, | Performed by: ORTHOPAEDIC SURGERY

## 2021-12-08 PROCEDURE — 20610 DRAIN/INJ JOINT/BURSA W/O US: CPT | Mod: LT,S$GLB,, | Performed by: ORTHOPAEDIC SURGERY

## 2021-12-08 PROCEDURE — 20610 LARGE JOINT ASPIRATION/INJECTION: L KNEE: ICD-10-PCS | Mod: LT,S$GLB,, | Performed by: ORTHOPAEDIC SURGERY

## 2021-12-08 PROCEDURE — 99999 PR PBB SHADOW E&M-EST. PATIENT-LVL IV: ICD-10-PCS | Mod: PBBFAC,,, | Performed by: ORTHOPAEDIC SURGERY

## 2021-12-08 RX ORDER — TRIAMCINOLONE ACETONIDE 40 MG/ML
40 INJECTION, SUSPENSION INTRA-ARTICULAR; INTRAMUSCULAR
Status: DISCONTINUED | OUTPATIENT
Start: 2021-12-08 | End: 2021-12-08 | Stop reason: HOSPADM

## 2021-12-08 RX ADMIN — TRIAMCINOLONE ACETONIDE 40 MG: 40 INJECTION, SUSPENSION INTRA-ARTICULAR; INTRAMUSCULAR at 08:12

## 2021-12-09 ENCOUNTER — CLINICAL SUPPORT (OUTPATIENT)
Dept: REHABILITATION | Facility: HOSPITAL | Age: 76
End: 2021-12-09
Payer: MEDICARE

## 2021-12-09 DIAGNOSIS — R29.3 POSTURE ABNORMALITY: ICD-10-CM

## 2021-12-09 DIAGNOSIS — M54.2 PAINFUL CERVICAL RANGE OF MOTION: ICD-10-CM

## 2021-12-09 DIAGNOSIS — M25.612 DECREASED RANGE OF MOTION OF LEFT SHOULDER: ICD-10-CM

## 2021-12-09 PROCEDURE — 97110 THERAPEUTIC EXERCISES: CPT | Mod: PN,CQ

## 2021-12-10 ENCOUNTER — OFFICE VISIT (OUTPATIENT)
Dept: FAMILY MEDICINE | Facility: CLINIC | Age: 76
End: 2021-12-10
Attending: FAMILY MEDICINE
Payer: MEDICARE

## 2021-12-10 VITALS
BODY MASS INDEX: 25.07 KG/M2 | RESPIRATION RATE: 18 BRPM | TEMPERATURE: 98 F | HEART RATE: 80 BPM | SYSTOLIC BLOOD PRESSURE: 117 MMHG | DIASTOLIC BLOOD PRESSURE: 66 MMHG | WEIGHT: 150.44 LBS | HEIGHT: 65 IN | OXYGEN SATURATION: 98 %

## 2021-12-10 DIAGNOSIS — C73 THYROID CANCER: ICD-10-CM

## 2021-12-10 DIAGNOSIS — G60.3 IDIOPATHIC PROGRESSIVE NEUROPATHY: Primary | ICD-10-CM

## 2021-12-10 PROCEDURE — 99999 PR PBB SHADOW E&M-EST. PATIENT-LVL V: ICD-10-PCS | Mod: PBBFAC,,, | Performed by: FAMILY MEDICINE

## 2021-12-10 PROCEDURE — 99213 OFFICE O/P EST LOW 20 MIN: CPT | Mod: S$GLB,,, | Performed by: FAMILY MEDICINE

## 2021-12-10 PROCEDURE — 99999 PR PBB SHADOW E&M-EST. PATIENT-LVL V: CPT | Mod: PBBFAC,,, | Performed by: FAMILY MEDICINE

## 2021-12-10 PROCEDURE — 99213 PR OFFICE/OUTPT VISIT, EST, LEVL III, 20-29 MIN: ICD-10-PCS | Mod: S$GLB,,, | Performed by: FAMILY MEDICINE

## 2021-12-13 ENCOUNTER — CLINICAL SUPPORT (OUTPATIENT)
Dept: ENDOCRINOLOGY | Facility: CLINIC | Age: 76
End: 2021-12-13
Payer: MEDICARE

## 2021-12-13 ENCOUNTER — TELEPHONE (OUTPATIENT)
Dept: ENDOCRINOLOGY | Facility: CLINIC | Age: 76
End: 2021-12-13

## 2021-12-13 DIAGNOSIS — C73 THYROID CANCER: Primary | ICD-10-CM

## 2021-12-13 PROCEDURE — 96372 THER/PROPH/DIAG INJ SC/IM: CPT | Mod: S$GLB,,, | Performed by: INTERNAL MEDICINE

## 2021-12-13 PROCEDURE — 99999 PR PBB SHADOW E&M-EST. PATIENT-LVL I: ICD-10-PCS | Mod: PBBFAC,,,

## 2021-12-13 PROCEDURE — 99999 PR PBB SHADOW E&M-EST. PATIENT-LVL I: CPT | Mod: PBBFAC,,,

## 2021-12-13 PROCEDURE — 96372 PR INJECTION,THERAP/PROPH/DIAG2ST, IM OR SUBCUT: ICD-10-PCS | Mod: S$GLB,,, | Performed by: INTERNAL MEDICINE

## 2021-12-14 ENCOUNTER — LAB VISIT (OUTPATIENT)
Dept: LAB | Facility: HOSPITAL | Age: 76
End: 2021-12-14
Attending: RADIOLOGY
Payer: MEDICARE

## 2021-12-14 ENCOUNTER — CLINICAL SUPPORT (OUTPATIENT)
Dept: ENDOCRINOLOGY | Facility: CLINIC | Age: 76
End: 2021-12-14
Payer: MEDICARE

## 2021-12-14 DIAGNOSIS — C79.9 METASTASIS FROM THYROID CANCER: ICD-10-CM

## 2021-12-14 DIAGNOSIS — C73 METASTASIS FROM THYROID CANCER: ICD-10-CM

## 2021-12-14 DIAGNOSIS — C73 THYROID CANCER: ICD-10-CM

## 2021-12-14 LAB
T4 FREE SERPL-MCNC: 1.5 NG/DL (ref 0.71–1.51)
TSH SERPL DL<=0.005 MIU/L-ACNC: 92.92 UIU/ML (ref 0.4–4)

## 2021-12-14 PROCEDURE — 96372 THER/PROPH/DIAG INJ SC/IM: CPT | Mod: S$GLB,,, | Performed by: INTERNAL MEDICINE

## 2021-12-14 PROCEDURE — 99999 PR PBB SHADOW E&M-EST. PATIENT-LVL I: CPT | Mod: PBBFAC,,,

## 2021-12-14 PROCEDURE — 84439 ASSAY OF FREE THYROXINE: CPT | Performed by: RADIOLOGY

## 2021-12-14 PROCEDURE — 84443 ASSAY THYROID STIM HORMONE: CPT | Performed by: RADIOLOGY

## 2021-12-14 PROCEDURE — 84432 ASSAY OF THYROGLOBULIN: CPT | Performed by: INTERNAL MEDICINE

## 2021-12-14 PROCEDURE — 36415 COLL VENOUS BLD VENIPUNCTURE: CPT | Mod: PO | Performed by: RADIOLOGY

## 2021-12-14 PROCEDURE — 99999 PR PBB SHADOW E&M-EST. PATIENT-LVL I: ICD-10-PCS | Mod: PBBFAC,,,

## 2021-12-14 PROCEDURE — 96372 PR INJECTION,THERAP/PROPH/DIAG2ST, IM OR SUBCUT: ICD-10-PCS | Mod: S$GLB,,, | Performed by: INTERNAL MEDICINE

## 2021-12-15 ENCOUNTER — HOSPITAL ENCOUNTER (OUTPATIENT)
Dept: RADIOLOGY | Facility: HOSPITAL | Age: 76
Discharge: HOME | End: 2021-12-15
Attending: INTERNAL MEDICINE
Payer: MEDICARE

## 2021-12-15 DIAGNOSIS — C73 MALIGNANT NEOPLASM OF THYROID GLAND: ICD-10-CM

## 2021-12-15 PROCEDURE — 79005 NM THERAPY BY ORAL ADMINISTRATION: ICD-10-PCS | Mod: 26,,, | Performed by: RADIOLOGY

## 2021-12-15 PROCEDURE — 79005 NUCLEAR RX ORAL ADMIN: CPT | Mod: TC

## 2021-12-15 PROCEDURE — 79005 NUCLEAR RX ORAL ADMIN: CPT | Mod: 26,,, | Performed by: RADIOLOGY

## 2021-12-15 RX ORDER — ONDANSETRON 4 MG/1
4 TABLET, ORALLY DISINTEGRATING ORAL EVERY 8 HOURS PRN
Qty: 10 TABLET | Refills: 0 | Status: SHIPPED | OUTPATIENT
Start: 2021-12-15 | End: 2021-12-28 | Stop reason: CLARIF

## 2021-12-16 ENCOUNTER — TELEPHONE (OUTPATIENT)
Dept: PHYSICAL MEDICINE AND REHAB | Facility: CLINIC | Age: 76
End: 2021-12-16
Payer: MEDICARE

## 2021-12-16 ENCOUNTER — TELEPHONE (OUTPATIENT)
Dept: ORTHOPEDICS | Facility: CLINIC | Age: 76
End: 2021-12-16
Payer: MEDICARE

## 2021-12-17 ENCOUNTER — TELEPHONE (OUTPATIENT)
Dept: ORTHOPEDICS | Facility: CLINIC | Age: 76
End: 2021-12-17
Payer: MEDICARE

## 2021-12-17 ENCOUNTER — TELEPHONE (OUTPATIENT)
Dept: PHYSICAL MEDICINE AND REHAB | Facility: CLINIC | Age: 76
End: 2021-12-17
Payer: MEDICARE

## 2021-12-22 ENCOUNTER — HOSPITAL ENCOUNTER (OUTPATIENT)
Dept: RADIOLOGY | Facility: HOSPITAL | Age: 76
Discharge: HOME | End: 2021-12-22
Attending: INTERNAL MEDICINE
Payer: MEDICARE

## 2021-12-22 DIAGNOSIS — C73 MALIGNANT NEOPLASM OF THYROID GLAND: ICD-10-CM

## 2021-12-22 PROCEDURE — 78018 THYROID MET IMAGING BODY: CPT | Mod: 26,,, | Performed by: RADIOLOGY

## 2021-12-22 PROCEDURE — 78018 NM THYROID METASTATIC CANCER WHOLE BODY SCAN: ICD-10-PCS | Mod: 26,,, | Performed by: RADIOLOGY

## 2021-12-22 PROCEDURE — 78018 THYROID MET IMAGING BODY: CPT | Mod: TC

## 2021-12-28 ENCOUNTER — HOSPITAL ENCOUNTER (OUTPATIENT)
Dept: RADIOLOGY | Facility: HOSPITAL | Age: 76
Discharge: HOME | End: 2021-12-28
Attending: ORTHOPAEDIC SURGERY
Payer: MEDICARE

## 2021-12-28 ENCOUNTER — TELEPHONE (OUTPATIENT)
Dept: PHYSICAL MEDICINE AND REHAB | Facility: CLINIC | Age: 76
End: 2021-12-28
Payer: MEDICARE

## 2021-12-28 ENCOUNTER — TELEPHONE (OUTPATIENT)
Dept: ORTHOPEDICS | Facility: CLINIC | Age: 76
End: 2021-12-28
Payer: MEDICARE

## 2021-12-28 DIAGNOSIS — M25.512 LEFT SHOULDER PAIN, UNSPECIFIED CHRONICITY: ICD-10-CM

## 2021-12-28 PROCEDURE — 96372 THER/PROPH/DIAG INJ SC/IM: CPT

## 2021-12-28 PROCEDURE — 73221 MRI JOINT UPR EXTREM W/O DYE: CPT | Mod: TC,PO,LT

## 2021-12-28 PROCEDURE — 99284 EMERGENCY DEPT VISIT MOD MDM: CPT | Mod: 25

## 2021-12-29 ENCOUNTER — OFFICE VISIT (OUTPATIENT)
Dept: ORTHOPEDICS | Facility: CLINIC | Age: 76
End: 2021-12-29
Payer: MEDICARE

## 2021-12-29 ENCOUNTER — HOSPITAL ENCOUNTER (EMERGENCY)
Facility: HOSPITAL | Age: 76
Discharge: HOME | End: 2021-12-29
Attending: EMERGENCY MEDICINE
Payer: MEDICARE

## 2021-12-29 VITALS — WEIGHT: 146 LBS | HEIGHT: 65 IN | BODY MASS INDEX: 24.32 KG/M2 | RESPIRATION RATE: 16 BRPM

## 2021-12-29 VITALS
HEART RATE: 70 BPM | HEIGHT: 65 IN | RESPIRATION RATE: 18 BRPM | TEMPERATURE: 99 F | WEIGHT: 146 LBS | SYSTOLIC BLOOD PRESSURE: 130 MMHG | DIASTOLIC BLOOD PRESSURE: 58 MMHG | OXYGEN SATURATION: 99 % | BODY MASS INDEX: 24.32 KG/M2

## 2021-12-29 DIAGNOSIS — G54.0 BRACHIAL PLEXUS DISORDERS: ICD-10-CM

## 2021-12-29 DIAGNOSIS — M25.512 LEFT SHOULDER PAIN, UNSPECIFIED CHRONICITY: Primary | ICD-10-CM

## 2021-12-29 DIAGNOSIS — M79.2 NERVE PAIN: Primary | ICD-10-CM

## 2021-12-29 DIAGNOSIS — M79.602 LEFT ARM PAIN: ICD-10-CM

## 2021-12-29 DIAGNOSIS — M25.512 LEFT SHOULDER PAIN, UNSPECIFIED CHRONICITY: ICD-10-CM

## 2021-12-29 PROCEDURE — 99999 PR PBB SHADOW E&M-EST. PATIENT-LVL III: CPT | Mod: PBBFAC,,, | Performed by: ORTHOPAEDIC SURGERY

## 2021-12-29 PROCEDURE — 1159F PR MEDICATION LIST DOCUMENTED IN MEDICAL RECORD: ICD-10-PCS | Mod: CPTII,S$GLB,, | Performed by: ORTHOPAEDIC SURGERY

## 2021-12-29 PROCEDURE — 3288F FALL RISK ASSESSMENT DOCD: CPT | Mod: CPTII,S$GLB,, | Performed by: ORTHOPAEDIC SURGERY

## 2021-12-29 PROCEDURE — 93005 ELECTROCARDIOGRAM TRACING: CPT

## 2021-12-29 PROCEDURE — 99213 PR OFFICE/OUTPT VISIT, EST, LEVL III, 20-29 MIN: ICD-10-PCS | Mod: S$GLB,,, | Performed by: ORTHOPAEDIC SURGERY

## 2021-12-29 PROCEDURE — 3288F PR FALLS RISK ASSESSMENT DOCUMENTED: ICD-10-PCS | Mod: CPTII,S$GLB,, | Performed by: ORTHOPAEDIC SURGERY

## 2021-12-29 PROCEDURE — 1159F MED LIST DOCD IN RCRD: CPT | Mod: CPTII,S$GLB,, | Performed by: ORTHOPAEDIC SURGERY

## 2021-12-29 PROCEDURE — 99213 OFFICE O/P EST LOW 20 MIN: CPT | Mod: S$GLB,,, | Performed by: ORTHOPAEDIC SURGERY

## 2021-12-29 PROCEDURE — 63600175 PHARM REV CODE 636 W HCPCS: Performed by: EMERGENCY MEDICINE

## 2021-12-29 PROCEDURE — 1125F AMNT PAIN NOTED PAIN PRSNT: CPT | Mod: CPTII,S$GLB,, | Performed by: ORTHOPAEDIC SURGERY

## 2021-12-29 PROCEDURE — 1160F RVW MEDS BY RX/DR IN RCRD: CPT | Mod: CPTII,S$GLB,, | Performed by: ORTHOPAEDIC SURGERY

## 2021-12-29 PROCEDURE — 1125F PR PAIN SEVERITY QUANTIFIED, PAIN PRESENT: ICD-10-PCS | Mod: CPTII,S$GLB,, | Performed by: ORTHOPAEDIC SURGERY

## 2021-12-29 PROCEDURE — 99999 PR PBB SHADOW E&M-EST. PATIENT-LVL III: ICD-10-PCS | Mod: PBBFAC,,, | Performed by: ORTHOPAEDIC SURGERY

## 2021-12-29 PROCEDURE — 1101F PR PT FALLS ASSESS DOC 0-1 FALLS W/OUT INJ PAST YR: ICD-10-PCS | Mod: CPTII,S$GLB,, | Performed by: ORTHOPAEDIC SURGERY

## 2021-12-29 PROCEDURE — 1160F PR REVIEW ALL MEDS BY PRESCRIBER/CLIN PHARMACIST DOCUMENTED: ICD-10-PCS | Mod: CPTII,S$GLB,, | Performed by: ORTHOPAEDIC SURGERY

## 2021-12-29 PROCEDURE — 1101F PT FALLS ASSESS-DOCD LE1/YR: CPT | Mod: CPTII,S$GLB,, | Performed by: ORTHOPAEDIC SURGERY

## 2021-12-29 RX ORDER — MORPHINE SULFATE 10 MG/ML
6 INJECTION INTRAMUSCULAR; INTRAVENOUS; SUBCUTANEOUS
Status: COMPLETED | OUTPATIENT
Start: 2021-12-29 | End: 2021-12-29

## 2021-12-29 RX ORDER — METFORMIN HYDROCHLORIDE 500 MG/1
TABLET, EXTENDED RELEASE ORAL
COMMUNITY
End: 2022-08-03

## 2021-12-29 RX ORDER — MORPHINE SULFATE 2 MG/ML
6 INJECTION, SOLUTION INTRAMUSCULAR; INTRAVENOUS
Status: DISCONTINUED | OUTPATIENT
Start: 2021-12-29 | End: 2021-12-29 | Stop reason: SDUPTHER

## 2021-12-29 RX ORDER — GABAPENTIN 100 MG/1
CAPSULE ORAL
COMMUNITY
End: 2022-03-15 | Stop reason: ALTCHOICE

## 2021-12-29 RX ADMIN — MORPHINE SULFATE 6 MG: 10 INJECTION, SOLUTION INTRAMUSCULAR; INTRAVENOUS at 02:12

## 2021-12-29 NOTE — ED PROVIDER NOTES
Encounter Date: 12/28/2021       History     Chief Complaint   Patient presents with    Shoulder Pain     Patient reporting drooping of left shoulder intermittently. Patient reporting MRI this am and appointment with Dr. Iverson in AM. Patient was instructed to come to ED if this happened again.      HPI patient is 76-year-old woman who presents emergency department complaining of left arm pain that radiates to her left chest and bilateral upper back this evening.  The pain wakes her from sleep and his been occurring pretty much nightly for the past 2 months.  She had an MRI of the left shoulder yesterday that showed tendinopathy and partial-thickness articular surface tearing of the distal supraspinatus tendon of the left shoulder and AC joint arthrosis.  She sees orthopedic surgeon in the morning.  Denies any other symptoms.  She complains of tingling in bilateral hands and feet nightly as well.  Review of patient's allergies indicates:  No Known Allergies  Past Medical History:   Diagnosis Date    Allergy     Anxiety     Cataract     Colon polyp     Degenerative arthritis of knee 4/3/2012    Diverticulosis     GERD (gastroesophageal reflux disease)     Hyperlipidemia     Hypertension     Multinodular goiter 3/26/2012    Myopathy, unspecified 1/18/2010    Tuberculosis      Past Surgical History:   Procedure Laterality Date    BREAST BIOPSY Left 2015    benign    COLONOSCOPY  12/2/15    Dr. Britton, multiple polyps, recheck five years-three years if more than 2 polyps are adenomatous    COLONOSCOPY N/A 12/2/2015    COLONOSCOPY N/A 3/20/2019    Procedure: COLONOSCOPY;  Surgeon: Jose Britton MD;  Location: [facility];  Service: Endoscopy;  Laterality: N/A;    COLONOSCOPY N/A 5/20/2020    Dr. Britton; internal hemorrhoids; diverticulosis; polyps removed; repeat in 3 years    CYSTOSCOPY N/A 8/26/2020    Procedure: CYSTOSCOPY;  Surgeon: Charlotte Walters Jr., MD;  Location: [facility] OR;  Service:  Urology;  Laterality: N/A;    DISSECTION OF NECK Left 9/13/2021    Procedure: DISSECTION, NECK;  Surgeon: Ryan Torres MD;  Location: [facility] OR [address] [place] [address];  Service: ENT;  Laterality: Left;    ESOPHAGOGASTRODUODENOSCOPY N/A 5/20/2020    Dr. Britton; small hiatal hernia; gastritis; 8 gastric polyps removed    FOOT SURGERY      HYSTERECTOMY      INTRALUMINAL GASTROINTESTINAL TRACT IMAGING VIA CAPSULE N/A 6/4/2020    Procedure: IMAGING PROCEDURE, GI TRACT, INTRALUMINAL, VIA CAPSULE;  Surgeon: Jose Britton MD;  Location: [facility] ENDO;  Service: Endoscopy;  Laterality: N/A;    KNEE SURGERY  06/06/2013    right knee tear Dr Iverson     LAPAROSCOPIC CHOLECYSTECTOMY N/A 9/17/2020    Procedure: CHOLECYSTECTOMY, LAPAROSCOPIC;  Surgeon: Forrest Veronica MD;  Location: [facility];  Service: General;  Laterality: N/A;    LUNG LOBECTOMY  1966    right middle lobectomy, due to Tb    OOPHORECTOMY      SHOULDER SURGERY      francis     THYROIDECTOMY Bilateral 5/19/2021    Procedure: THYROIDECTOMY;  Surgeon: Jamia Amezquita MD;  Location: [facility] OR [address] [place] [address];  Service: General;  Laterality: Bilateral;    THYROIDECTOMY Bilateral 9/13/2021    Procedure: THYROIDECTOMY WITH INTRA-OP PTH;  Surgeon: Ryan Torres MD;  Location: [address];  Service: ENT;  Laterality: Bilateral;  NIM tube  Intraop PTH     Family History   Problem Relation Age of Onset    Colon cancer Other     Cancer Mother     Hypertension Mother     Hyperlipidemia Mother     Cancer Brother     Hypertension Father     Emphysema Father     Diabetes Son     Hypertension Son     Alcohol abuse Son     Diabetes Maternal Aunt     Alzheimer's disease Maternal Uncle     Cancer Maternal Grandmother     No Known Problems Daughter     Dementia Sister     Alzheimer's disease Sister     Breast cancer Sister 60    Obesity Paternal Uncle     Prostate cancer Other     Melanoma Neg Hx     Psoriasis Neg Hx     Lupus Neg Hx     Eczema Neg Hx      Amblyopia Neg Hx     Blindness Neg Hx     Cataracts Neg Hx     Glaucoma Neg Hx     Macular degeneration Neg Hx     Retinal detachment Neg Hx     Strabismus Neg Hx     Stroke Neg Hx     Thyroid cancer Neg Hx      Social History     Tobacco Use    Smoking status: Never Smoker    Smokeless tobacco: Never Used   Substance Use Topics    Alcohol use: Not Currently     Comment: stopped 2019    Drug use: No     Review of Systems   Constitutional: Negative for fever.   HENT: Negative for sore throat.    Respiratory: Negative for shortness of breath.    Cardiovascular: Positive for chest pain.   Gastrointestinal: Negative for nausea.   Genitourinary: Negative for dysuria.   Musculoskeletal: Positive for arthralgias and back pain.   Skin: Negative for rash.   Neurological: Positive for numbness. Negative for weakness.   Hematological: Does not bruise/bleed easily.       Physical Exam     Initial Vitals [12/28/21 2346]   BP Pulse Resp Temp SpO2   (!) 142/66 73 18 98.5 °F (36.9 °C) 100 %      MAP       --         Physical Exam    Constitutional: She appears well-developed and well-nourished.  Non-toxic appearance. No distress.   HENT:   Head: Normocephalic and atraumatic.   Eyes: EOM are normal. Pupils are equal, round, and reactive to light.   Neck: Neck supple. No JVD present.   Normal range of motion.  Cardiovascular: Normal rate, regular rhythm, normal heart sounds and intact distal pulses. Exam reveals no gallop and no friction rub.    No murmur heard.  Pulmonary/Chest: Breath sounds normal. She has no wheezes. She has no rhonchi. She has no rales.   Abdominal: Abdomen is soft. Bowel sounds are normal. She exhibits no distension. There is no abdominal tenderness. There is no rebound and no guarding.   Musculoskeletal:         General: Tenderness (Pain in the left shoulder to resistance on abduction. ) present. Normal range of motion.      Cervical back: Normal range of motion and neck supple. No rigidity.      Neurological: She is alert and oriented to person, place, and time. She has normal strength and normal reflexes. No cranial nerve deficit or sensory deficit. She exhibits normal muscle tone. Coordination normal. GCS eye subscore is 4. GCS verbal subscore is 5. GCS motor subscore is 6.   Skin: Skin is warm and dry.   Psychiatric: She has a normal mood and affect. Her speech is normal and behavior is normal. She is not actively hallucinating.         ED Course   Procedures  Labs Reviewed - No data to display     ECG Results          EKG 12-lead (Final result)  Result time 12/29/21 14:08:56    Final result by Interface, Lab In [facility] (12/29/21 14:08:56)                 Narrative:    Test Reason : M79.602,    Vent. Rate : 058 BPM     Atrial Rate : 058 BPM     P-R Int : 162 ms          QRS Dur : 080 ms      QT Int : 464 ms       P-R-T Axes : 067 024 039 degrees     QTc Int : 455 ms    Sinus bradycardia  Otherwise normal ECG  When compared with ECG of 15-OCT-2021 13:24,  Vent. rate has decreased BY  44 BPM  Confirmed by Wilder CADENA, Juan M IZAGUIRRE ([id number]) on 12/29/2021 2:08:50 PM    Referred By: [email or web address]   SELF           Confirmed By:Juan M Hdz MD                            Imaging Results    None          Medications   morphine injection 6 mg (6 mg Intramuscular Given 12/29/21 0225)     Medical Decision Making:   Initial Assessment:   Patient is 76-year-old woman who presents emergency department complaining of left arm pain that radiates to her left chest and bilateral upper back this evening.  The pain wakes her from sleep and his been occurring pretty much nightly for the past 2 months.  She had an MRI of the left shoulder yesterday that showed tendinopathy and partial-thickness articular surface tearing of the distal supraspinatus tendon of the left shoulder and AC joint arthrosis.  She sees orthopedic surgeon in the morning.  Denies any other symptoms.  She complains of tingling in bilateral hands and  feet nightly as well.  ED Management:  Neurovascularly intact.  No deformity noted.  EKG without acute ischemic changes, doubt atypical ACS.  Given pain treatment in the ED with improvement.  Wishes to go home for orthopedic follow-up in the morning.  Likely related to rotator cuff injury/brachial plexopathy.  Discharged in no acute distress for orthopedic follow-up in the morning.                      Clinical Impression:   Final diagnoses:  [M79.602] Left arm pain  [M25.512] Left shoulder pain, unspecified chronicity (Primary)          ED Disposition Condition    Discharge Stable        ED Prescriptions     None        Follow-up Information     Follow up With Specialties Details Why Contact Info    Adriel Iverson MD Sports Medicine, Orthopedic Surgery Schedule an appointment as soon as possible for a visit   [address] [place] [address] DR Blackwell [address] [place] [address]  [phone]      [facility] Emergency Dept Emergency Medicine  As needed, If symptoms worsen [address] [place] [address] [id number] [phone]           Eitan Oneil MD  12/29/21 3401

## 2021-12-30 ENCOUNTER — TELEPHONE (OUTPATIENT)
Dept: OTOLARYNGOLOGY | Facility: CLINIC | Age: 76
End: 2021-12-30
Payer: MEDICARE

## 2021-12-30 ENCOUNTER — TELEPHONE (OUTPATIENT)
Dept: ENDOCRINOLOGY | Facility: CLINIC | Age: 76
End: 2021-12-30
Payer: MEDICARE

## 2021-12-30 ENCOUNTER — TELEPHONE (OUTPATIENT)
Dept: ORTHOPEDICS | Facility: CLINIC | Age: 76
End: 2021-12-30
Payer: MEDICARE

## 2021-12-30 ENCOUNTER — HOSPITAL ENCOUNTER (EMERGENCY)
Facility: HOSPITAL | Age: 76
Discharge: HOME | End: 2021-12-30
Attending: EMERGENCY MEDICINE
Payer: MEDICARE

## 2021-12-30 VITALS
HEART RATE: 71 BPM | SYSTOLIC BLOOD PRESSURE: 134 MMHG | OXYGEN SATURATION: 100 % | TEMPERATURE: 98 F | HEIGHT: 65 IN | RESPIRATION RATE: 18 BRPM | WEIGHT: 146 LBS | BODY MASS INDEX: 24.32 KG/M2 | DIASTOLIC BLOOD PRESSURE: 63 MMHG

## 2021-12-30 DIAGNOSIS — K11.5 SIALOLITHIASIS OF SUBMANDIBULAR GLAND: Primary | ICD-10-CM

## 2021-12-30 LAB
ANION GAP SERPL CALC-SCNC: 11 MMOL/L (ref 8–16)
BUN SERPL-MCNC: 10 MG/DL (ref 8–23)
CALCIUM SERPL-MCNC: 8.7 MG/DL (ref 8.7–10.5)
CHLORIDE SERPL-SCNC: 103 MMOL/L (ref 95–110)
CO2 SERPL-SCNC: 27 MMOL/L (ref 23–29)
CREAT SERPL-MCNC: 1 MG/DL (ref 0.5–1.4)
EST. GFR  (AFRICAN AMERICAN): >60 ML/MIN/1.73 M^2
EST. GFR  (NON AFRICAN AMERICAN): 54 ML/MIN/1.73 M^2
GLUCOSE SERPL-MCNC: 107 MG/DL (ref 70–110)
POTASSIUM SERPL-SCNC: 4.2 MMOL/L (ref 3.5–5.1)
SARS-COV-2 RDRP RESP QL NAA+PROBE: NEGATIVE
SODIUM SERPL-SCNC: 141 MMOL/L (ref 136–145)

## 2021-12-30 PROCEDURE — 25500020 PHARM REV CODE 255

## 2021-12-30 PROCEDURE — 80048 BASIC METABOLIC PNL TOTAL CA: CPT | Performed by: NURSE PRACTITIONER

## 2021-12-30 PROCEDURE — 36415 COLL VENOUS BLD VENIPUNCTURE: CPT | Performed by: NURSE PRACTITIONER

## 2021-12-30 PROCEDURE — U0002 COVID-19 LAB TEST NON-CDC: HCPCS | Performed by: EMERGENCY MEDICINE

## 2021-12-30 PROCEDURE — 99285 EMERGENCY DEPT VISIT HI MDM: CPT | Mod: 25

## 2021-12-30 RX ADMIN — IOHEXOL 75 ML: 350 INJECTION, SOLUTION INTRAVENOUS at 03:12

## 2021-12-30 NOTE — ED PROVIDER NOTES
Encounter Date: 12/30/2021    SCRIBE #1 NOTE: I, Sol Vigil, am scribing for, and in the presence of, Mahesh Castrejon MD.       History     Chief Complaint   Patient presents with    Generalized Weakness     Bilateral leg weakness intermittent for the past several weeks. Reports seen recently for same.      Time seen by provider: 2:17 PM on 12/30/2021    Erica Masterson is a 76 y.o. female who presents to the ED for evaluation of intermittent difficulty swallowing since her thyroidectomy in September 2021. Patient has been evaluated by her ENT who performed the surgery, and a second ENT regarding her symptoms. Neither ENT were concerned regarding her symptoms. She is able to drink fluids. She does occasionally complain of leg weakness, but denies any weakness at this time and states she is not here for that as it is currently not bothering her. She has been seen by her PCP regarding her leg weakness.  Tolerating her secretions, denies shortness of breath or any respiratory distress.  The patient denies fever, cough, chest pain, SOB, nausea, vomiting, diarrhea or any other symptoms at this time.   PMHx of GERD, HLD, multinodular goiter, HTN, and anxiety.  PSHx of thyroidectomy.    The history is provided by the patient.     Review of patient's allergies indicates:  No Known Allergies  Past Medical History:   Diagnosis Date    Allergy     Anxiety     Cataract     Colon polyp     Degenerative arthritis of knee 4/3/2012    Diverticulosis     GERD (gastroesophageal reflux disease)     Hyperlipidemia     Hypertension     Multinodular goiter 3/26/2012    Myopathy, unspecified 1/18/2010    Tuberculosis      Past Surgical History:   Procedure Laterality Date    BREAST BIOPSY Left 2015    benign    COLONOSCOPY  12/2/15    Dr. Britton, multiple polyps, recheck five years-three years if more than 2 polyps are adenomatous    COLONOSCOPY N/A 12/2/2015    COLONOSCOPY N/A 3/20/2019    Procedure: COLONOSCOPY;   Surgeon: Jose Britton MD;  Location: [facility] ENDO;  Service: Endoscopy;  Laterality: N/A;    COLONOSCOPY N/A 5/20/2020    Dr. Britton; internal hemorrhoids; diverticulosis; polyps removed; repeat in 3 years    CYSTOSCOPY N/A 8/26/2020    Procedure: CYSTOSCOPY;  Surgeon: Charlotte Walters Jr., MD;  [facility];  Service: Urology;  Laterality: N/A;    DISSECTION OF NECK Left 9/13/2021    Procedure: DISSECTION, NECK;  Surgeon: Ryan Torres MD;  Location: [facility] OR 2ND FLR;  Service: ENT;  Laterality: Left;    ESOPHAGOGASTRODUODENOSCOPY N/A 5/20/2020    Dr. Britton; small hiatal hernia; gastritis; 8 gastric polyps removed    FOOT SURGERY      HYSTERECTOMY      INTRALUMINAL GASTROINTESTINAL TRACT IMAGING VIA CAPSULE N/A 6/4/2020    Procedure: IMAGING PROCEDURE, GI TRACT, INTRALUMINAL, VIA CAPSULE;  Surgeon: Jose Britton MD;  Location: [facility] ENDO;  Service: Endoscopy;  Laterality: N/A;    KNEE SURGERY  06/06/2013    right knee tear Dr Iverson     LAPAROSCOPIC CHOLECYSTECTOMY N/A 9/17/2020    Procedure: CHOLECYSTECTOMY, LAPAROSCOPIC;  Surgeon: Forrest Veronica MD;  Location: [facility] OR;  Service: General;  Laterality: N/A;    LUNG LOBECTOMY  1966    right middle lobectomy, due to Tb    OOPHORECTOMY      SHOULDER SURGERY      francis     THYROIDECTOMY Bilateral 5/19/2021    Procedure: THYROIDECTOMY;  Surgeon: Jamia Amezquita MD;  Location: [facility] OR 2ND FLR;  Service: General;  Laterality: Bilateral;    THYROIDECTOMY Bilateral 9/13/2021    Procedure: THYROIDECTOMY WITH INTRA-OP PTH;  Surgeon: Ryan Torres MD;  Location: [facility] OR 2ND FLR;  Service: ENT;  Laterality: Bilateral;  NIM tube  Intraop PTH     Family History   Problem Relation Age of Onset    Colon cancer Other     Cancer Mother     Hypertension Mother     Hyperlipidemia Mother     Cancer Brother     Hypertension Father     Emphysema Father     Diabetes Son     Hypertension Son     Alcohol abuse Son     Diabetes Maternal Aunt      Alzheimer's disease Maternal Uncle     Cancer Maternal Grandmother     No Known Problems Daughter     Dementia Sister     Alzheimer's disease Sister     Breast cancer Sister 60    Obesity Paternal Uncle     Prostate cancer Other     Melanoma Neg Hx     Psoriasis Neg Hx     Lupus Neg Hx     Eczema Neg Hx     Amblyopia Neg Hx     Blindness Neg Hx     Cataracts Neg Hx     Glaucoma Neg Hx     Macular degeneration Neg Hx     Retinal detachment Neg Hx     Strabismus Neg Hx     Stroke Neg Hx     Thyroid cancer Neg Hx      Social History     Tobacco Use    Smoking status: Never Smoker    Smokeless tobacco: Never Used   Substance Use Topics    Alcohol use: Not Currently     Comment: stopped 2019    Drug use: No     Review of Systems   Constitutional: Negative for activity change, diaphoresis and fever.   HENT: Positive for trouble swallowing. Negative for congestion, dental problem, drooling, ear pain, facial swelling, nosebleeds, rhinorrhea, sore throat and voice change.    Eyes: Negative for pain and visual disturbance.   Respiratory: Negative for cough, shortness of breath and stridor.    Cardiovascular: Negative for chest pain.   Gastrointestinal: Negative for abdominal pain, blood in stool, diarrhea, nausea and vomiting.   Genitourinary: Negative for dysuria, hematuria, vaginal bleeding and vaginal discharge.   Musculoskeletal: Negative for gait problem.   Skin: Negative for rash and wound.   Neurological: Negative for seizures and headaches.   Psychiatric/Behavioral: Negative for hallucinations and suicidal ideas.       Physical Exam     Initial Vitals [12/30/21 1204]   BP Pulse Resp Temp SpO2   (!) 160/73 85 16 98.3 °F (36.8 °C) 98 %      MAP       --         Physical Exam    Nursing note and vitals reviewed.  Constitutional: She appears well-developed. She is not diaphoretic. No distress.   HENT:   Head: Normocephalic and atraumatic.   Nose: Nose normal.   Mouth/Throat: Uvula is midline,  oropharynx is clear and moist and mucous membranes are normal. No trismus in the jaw. No uvula swelling. No oropharyngeal exudate, posterior oropharyngeal edema, posterior oropharyngeal erythema or tonsillar abscesses.   Patient able to drink bottle of water without difficulty. No stridor.   Eyes: EOM are normal. Pupils are equal, round, and reactive to light. No scleral icterus.   Neck: Neck supple. No thyromegaly present. No stridor present. No tracheal deviation present. No JVD present.   Normal range of motion.  Cardiovascular: Normal rate, regular rhythm, normal heart sounds and intact distal pulses. Exam reveals no gallop and no friction rub.    No murmur heard.  Pulmonary/Chest: Breath sounds normal. No stridor. No respiratory distress. She has no wheezes. She has no rhonchi. She has no rales.   Abdominal: Abdomen is soft. Bowel sounds are normal. She exhibits no distension. There is no abdominal tenderness. There is no rebound and no guarding.   Musculoskeletal:         General: Normal range of motion.      Cervical back: Normal range of motion and neck supple.     Lymphadenopathy:     She has no cervical adenopathy.   Neurological: She is alert and oriented to person, place, and time. She has normal strength. No cranial nerve deficit or sensory deficit. Gait normal.   Normal gait.   Skin: Skin is warm and dry. Capillary refill takes less than 2 seconds. No rash noted.   Psychiatric: She has a normal mood and affect.         ED Course   Procedures  Labs Reviewed   BASIC METABOLIC PANEL - Abnormal; Notable for the following components:       Result Value    eGFR if non  54 (*)     All other components within normal limits   SARS-COV-2 RNA AMPLIFICATION, QUAL          Imaging Results          CT Soft Tissue Neck With Contrast (Final result)  Result time 12/30/21 16:12:27    Final result by Forrest Cody MD (12/30/21 16:12:27)                 Impression:      No evidence for acute  process.    Calcification on the left which may represent a stone within the left submandibular duct, without evidence for associated acute inflammation.  Moderate intraglandular submandibular ductal dilatation is present in a symmetric fashion bilaterally.    Status post total thyroidectomy.      Electronically signed by: Forrest Cody MD  Date:    12/30/2021  Time:    16:12             Narrative:    EXAMINATION:  CT SOFT TISSUE NECK WITH CONTRAST    CLINICAL HISTORY:  Epiglottitis or tonsillitis suspected;    TECHNIQUE:  Low dose axial images as well as sagittal and coronal reconstructions were performed from the skull base to the clavicles following the intravenous administration of 75 mL of Omnipaque 350.    COMPARISON:  None    FINDINGS:  There is no tonsillar enlargement or inflammatory change.  There is no fluid collection.    There is a 9 mm calcification medial to the angle of the mandible, which may represent a stone within the left submandibular duct.  There is moderate dilatation of the intraglandular submandibular ducts bilaterally, without enlargement of the submandibular glands or surrounding inflammatory change to indicate acute inflammation.    There is no mass or lymphadenopathy.  The major vascular structures of the neck are patent.  There has been a total thyroidectomy.  The parotid glands are unremarkable.  The paranasal sinuses are clear.                                 Medications   iohexoL (OMNIPAQUE 350) 350 mg iodine/mL injection (75 mLs Intravenous Given 12/30/21 1511)     Medical Decision Making:   History:   Old Medical Records: I decided to obtain old medical records.  Clinical Tests:   Lab Tests: Ordered and Reviewed  ED Management:  52 yo M presenting with swelling to submandibular gland consistent with sialadenitis.   No history of immunocompromise. Nontoxic appearance.  Patient euvolemic with no trismus and no airway compromise. Able to tolerate PO and drinking water in front of  me without difficulty. Unlikely PTA, RPA, Ludwigs, epiglottitis, acute HIV, or EBV. Do not suspect abscess, cellulitis, neoplasm or parotitis. Centor negative for strep.    Plan to DC home with prompt outpatient PCP follow up. Pt also has fu with ENT for chronic symptoms since surgery.  Symptomatic treatment discussed with lemon drops and sour candies as well as warm compresses and massage to gland. Pt understands and agrees with discharge instructions. Pt also given strict return precautions for any new or worsening symptoms and plans to follow up closely with PCP.             Scribe Attestation:   Scribe #1: I performed the above scribed service and the documentation accurately describes the services I performed. I attest to the accuracy of the note.        ED Course as of 12/31/21 1208   Thu Dec 30, 2021   1617 Impression:     No evidence for acute process.     Calcification on the left which may represent a stone within the left submandibular duct, without evidence for associated acute inflammation.  Moderate intraglandular submandibular ductal dilatation is present in a symmetric fashion bilaterally.     Status post total thyroidectomy.        Electronically signed by: Forrest Cody MD  Date:                                            12/30/2021  Time:                                           16:12 [BD]      ED Course User Index  [BD] Mahesh Castrejon MD          Attending Attestation:     Physician Attestation for Scribe:    I, Dr. Mahesh Castrejon, personally performed the services described in this documentation.   All medical record entries made by the scribe were at my direction and in my presence.   I have reviewed the chart and agree that the record is accurate and complete.   Mahesh Castrejon MD  4:50 PM 12/31/2021     DISCLAIMER: This note was prepared with [employer] voice recognition transcription software. Garbled syntax, mangled pronouns, and other bizarre constructions may be  attributed to that software system.         Clinical Impression:   Final diagnoses:  [K11.5] Sialolithiasis of submandibular gland (Primary)          ED Disposition Condition    Discharge Stable        ED Prescriptions     None        Follow-up Information     Follow up With Specialties Details Why Contact Info    Mack Garcia MD Otolaryngology Schedule an appointment as soon as possible for a visit   [address] [facility] [address]  [phone]             Mahesh Castrejon MD  12/31/21 3167

## 2021-12-30 NOTE — FIRST PROVIDER EVALUATION
Emergency Department TeleTriage Encounter Note      CHIEF COMPLAINT    Chief Complaint   Patient presents with    Generalized Weakness     Bilateral leg weakness intermittent for the past several weeks. Reports seen recently for same.        VITAL SIGNS   Initial Vitals [12/30/21 1204]   BP Pulse Resp Temp SpO2   (!) 160/73 85 16 98.3 °F (36.8 °C) 98 %      MAP       --            ALLERGIES    Review of patient's allergies indicates:  No Known Allergies    PROVIDER TRIAGE NOTE  This is a teletriage evaluation of a 76 y.o. female presenting to the ED complaining of bilateral leg weakness for the past few weeks. No trauma.  Reports that she was seen yesterday for the same but also had shoulder and back pain at that time.  Denies CP, SOB, abd pain, n/v/d.    Will obtain BMP to evaluate electrolytes due to age.     Initial orders will be placed and care will be transferred to an alternate provider when patient is roomed for a full evaluation. Any additional orders and the final disposition will be determined by that provider.           ORDERS  Labs Reviewed   BASIC METABOLIC PANEL       ED Orders (720h ago, onward)    Start Ordered     Status Ordering Provider    12/30/21 1223 12/30/21 1222  Basic Metabolic Panel (BMP)  STAT         Ordered MYESHA LE    12/30/21 1223 12/30/21 1222  Insert Saline lock IV  Once         Ordered MYESHA LE            Virtual Visit Note: The provider triage portion of this emergency department evaluation and documentation was performed via [employer], a HIPAA-compliant telemedicine application, in concert with a tele-presenter in the room. A face to face patient evaluation with one of my colleagues will occur once the patient is placed in an emergency department room.      DISCLAIMER: This note was prepared with [employer] voice recognition transcription software. Garbled syntax, mangled pronouns, and other bizarre constructions may be attributed to that  software system.

## 2022-01-03 ENCOUNTER — TELEPHONE (OUTPATIENT)
Dept: OTOLARYNGOLOGY | Facility: CLINIC | Age: 77
End: 2022-01-03
Payer: MEDICARE

## 2022-01-03 ENCOUNTER — PATIENT OUTREACH (OUTPATIENT)
Dept: ADMINISTRATIVE | Facility: OTHER | Age: 77
End: 2022-01-03
Payer: MEDICARE

## 2022-01-03 NOTE — PROGRESS NOTES
Chart was reviewed for overdue Proactive [facility] Encounters (ALLI)  topics  Updates were requested from care everywhere  Health Maintenance has been updated  [place] immunization registry triggered

## 2022-01-03 NOTE — TELEPHONE ENCOUNTER
----- Message from Sydnie Beyer sent at 1/3/2022 11:26 AM CST -----  Regarding: Pt Inquiry  Pt called to speak to Jessica, pt believes she missed a call from her.Pt also states she will take 11 am appointment tomorrow.      [phone] (home)

## 2022-01-03 NOTE — TELEPHONE ENCOUNTER
----- Message from Rowena Barnes, Patient Care Assistant sent at 1/3/2022 10:27 AM CST -----  Regarding: call back  Contact: Pt  Pt is requesting a call back in regards to still having complications from procedure in September. Pt states it feels like pins her and numbness on left side from eye to throat.      Pt @ [phone]

## 2022-01-04 ENCOUNTER — OFFICE VISIT (OUTPATIENT)
Dept: OTOLARYNGOLOGY | Facility: CLINIC | Age: 77
End: 2022-01-04
Payer: MEDICARE

## 2022-01-04 VITALS
SYSTOLIC BLOOD PRESSURE: 137 MMHG | HEART RATE: 75 BPM | DIASTOLIC BLOOD PRESSURE: 79 MMHG | WEIGHT: 148.13 LBS | TEMPERATURE: 98 F | BODY MASS INDEX: 24.65 KG/M2

## 2022-01-04 DIAGNOSIS — C73 THYROID CANCER: ICD-10-CM

## 2022-01-04 PROCEDURE — 3078F PR MOST RECENT DIASTOLIC BLOOD PRESSURE < 80 MM HG: ICD-10-PCS | Mod: CPTII,S$GLB,, | Performed by: OTOLARYNGOLOGY

## 2022-01-04 PROCEDURE — 99214 OFFICE O/P EST MOD 30 MIN: CPT | Mod: S$GLB,,, | Performed by: OTOLARYNGOLOGY

## 2022-01-04 PROCEDURE — 1101F PR PT FALLS ASSESS DOC 0-1 FALLS W/OUT INJ PAST YR: ICD-10-PCS | Mod: CPTII,S$GLB,, | Performed by: OTOLARYNGOLOGY

## 2022-01-04 PROCEDURE — 99499 RISK ADDL DX/OHS AUDIT: ICD-10-PCS | Mod: S$GLB,,, | Performed by: OTOLARYNGOLOGY

## 2022-01-04 PROCEDURE — 3288F FALL RISK ASSESSMENT DOCD: CPT | Mod: CPTII,S$GLB,, | Performed by: OTOLARYNGOLOGY

## 2022-01-04 PROCEDURE — 99499 UNLISTED E&M SERVICE: CPT | Mod: S$GLB,,, | Performed by: OTOLARYNGOLOGY

## 2022-01-04 PROCEDURE — 3078F DIAST BP <80 MM HG: CPT | Mod: CPTII,S$GLB,, | Performed by: OTOLARYNGOLOGY

## 2022-01-04 PROCEDURE — 1125F PR PAIN SEVERITY QUANTIFIED, PAIN PRESENT: ICD-10-PCS | Mod: CPTII,S$GLB,, | Performed by: OTOLARYNGOLOGY

## 2022-01-04 PROCEDURE — 1159F MED LIST DOCD IN RCRD: CPT | Mod: CPTII,S$GLB,, | Performed by: OTOLARYNGOLOGY

## 2022-01-04 PROCEDURE — 99999 PR PBB SHADOW E&M-EST. PATIENT-LVL III: CPT | Mod: PBBFAC,,, | Performed by: OTOLARYNGOLOGY

## 2022-01-04 PROCEDURE — 3288F PR FALLS RISK ASSESSMENT DOCUMENTED: ICD-10-PCS | Mod: CPTII,S$GLB,, | Performed by: OTOLARYNGOLOGY

## 2022-01-04 PROCEDURE — 1159F PR MEDICATION LIST DOCUMENTED IN MEDICAL RECORD: ICD-10-PCS | Mod: CPTII,S$GLB,, | Performed by: OTOLARYNGOLOGY

## 2022-01-04 PROCEDURE — 1101F PT FALLS ASSESS-DOCD LE1/YR: CPT | Mod: CPTII,S$GLB,, | Performed by: OTOLARYNGOLOGY

## 2022-01-04 PROCEDURE — 3075F PR MOST RECENT SYSTOLIC BLOOD PRESS GE 130-139MM HG: ICD-10-PCS | Mod: CPTII,S$GLB,, | Performed by: OTOLARYNGOLOGY

## 2022-01-04 PROCEDURE — 99214 PR OFFICE/OUTPT VISIT, EST, LEVL IV, 30-39 MIN: ICD-10-PCS | Mod: S$GLB,,, | Performed by: OTOLARYNGOLOGY

## 2022-01-04 PROCEDURE — 1125F AMNT PAIN NOTED PAIN PRSNT: CPT | Mod: CPTII,S$GLB,, | Performed by: OTOLARYNGOLOGY

## 2022-01-04 PROCEDURE — 99999 PR PBB SHADOW E&M-EST. PATIENT-LVL III: ICD-10-PCS | Mod: PBBFAC,,, | Performed by: OTOLARYNGOLOGY

## 2022-01-04 PROCEDURE — 3075F SYST BP GE 130 - 139MM HG: CPT | Mod: CPTII,S$GLB,, | Performed by: OTOLARYNGOLOGY

## 2022-01-04 NOTE — ASSESSMENT & PLAN NOTE
Overall, she is doing ok and improving. 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 The supraclavicular cutaneous nerves, the transverse cervical nerve, and the greater auricular nerve are all dissected in such an operation - some of these cutaneous nerves are salvageable but sometimes the cancer makes them impossible to save. So, numbness is expected regardless - it can take up to a year to get better. 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 Her brachial plexus was not dissected. She will need to continue working with PT as the spinal accessory nerve can take up to a year to recover.    She has some changes consistent with her COOPER in terms of dry mouth. We discussed preventive measures or conservative measures for handling sialadenitis (lemon drops, warm compresses, local massage).    I await her next Tg and subsequent scans, but the post-COOPER scan showed no evidence of metastatic disease. She will follow up in 6 months, sooner with issues.

## 2022-01-04 NOTE — PROGRESS NOTES
[facility]    Subjective:       Patient ID: Erica Masterson is a 76 y.o. female.    Chief Complaint: tightness and pain in neck and can't lift left arm above sh    HPI  Oncology History:  Oncology History   Thyroid cancer   5/19/2021 Initial Diagnosis    Thyroid cancer     5/19/2021 Surgery    1. Total thyroidectomy (Dr. Amezquita)     8/10/2021 Cancer Staged    Staging form: Thyroid - Differentiated, AJCC 8th Edition  - Clinical stage from 8/10/2021: Stage III (cT4a, cNX, cM0, Age at diagnosis: >= 55 years)     9/13/2021 Surgery    1) Completion total thyroidectomy with bilateral paratracheal and superior mediastinal dissection  2) Left modified neck dissection of levels II-Vb       9/27/2021 Cancer Staged    Staging form: Thyroid - Differentiated, AJCC 8th Edition  - Pathologic stage from 9/27/2021: Stage III (pT4a, pN1b, cM0, Age at diagnosis: >= 55 years)     12/15/2021 -  Radiation Therapy    Treating physician: Dr. Landrum, Dr. Yepez  Total Dose: 100 mCi COOPER         Erica Masterson is a 76 y.o. female with thyroid carcinoma s/p surgery and revision surgery and now COOPER on 12/15. Her post-COOPER scan was clear of metastatic disease, with only expected minimal uptake in the thyroid bed.     She still has a multitude of complaints - she complains of left neck tightness that makes her feel like she is choking at night, left neck numbness in the operative bed, and left shoulder decreased range of motion. She is breathing well and swallowing well, but notes that her taste is a little off and that her mouth is a little dry. She has been into the ED multiple times for these concerns and is always normal - she was just back on December 30 and had another normal CT of her neck.  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 Garcia.     She continues to follow along with Dr. Landrum. She sees him later this week. She stopped PT because of discomfort and  reports that she has had a rotator cuff injury in the past as well. She has an MRI scheduled of the brachial plexus as well as an EMG soon.     Past Medical History:   Diagnosis Date    Allergy     Anxiety     Cataract     Colon polyp     Degenerative arthritis of knee 4/3/2012    Diverticulosis     GERD (gastroesophageal reflux disease)     Hyperlipidemia     Hypertension     Multinodular goiter 3/26/2012    Myopathy, unspecified 1/18/2010    Tuberculosis        Past Surgical History:   Procedure Laterality Date    BREAST BIOPSY Left 2015    benign    COLONOSCOPY  12/2/15    Dr. Britton, multiple polyps, recheck five years-three years if more than 2 polyps are adenomatous    COLONOSCOPY N/A 12/2/2015    COLONOSCOPY N/A 3/20/2019    Procedure: COLONOSCOPY;  Surgeon: Jose Britton MD;  Location: [facility];  Service: Endoscopy;  Laterality: N/A;    COLONOSCOPY N/A 5/20/2020    Dr. Britton; internal hemorrhoids; diverticulosis; polyps removed; repeat in 3 years    CYSTOSCOPY N/A 8/26/2020    Procedure: CYSTOSCOPY;  Surgeon: Charlotte Walters Jr., MD;  [facility];  Service: Urology;  Laterality: N/A;    DISSECTION OF NECK Left 9/13/2021    Procedure: DISSECTION, NECK;  Surgeon: Ryan Torres MD;  Location: [facility] OR [address] [place] [address];  Service: ENT;  Laterality: Left;    ESOPHAGOGASTRODUODENOSCOPY N/A 5/20/2020    Dr. Britton; small hiatal hernia; gastritis; 8 gastric polyps removed    FOOT SURGERY      HYSTERECTOMY      INTRALUMINAL GASTROINTESTINAL TRACT IMAGING VIA CAPSULE N/A 6/4/2020    Procedure: IMAGING PROCEDURE, GI TRACT, INTRALUMINAL, VIA CAPSULE;  Surgeon: Jose Britton MD;  Location: [facility];  Service: Endoscopy;  Laterality: N/A;    KNEE SURGERY  06/06/2013    right knee tear Dr Iverson     LAPAROSCOPIC CHOLECYSTECTOMY N/A 9/17/2020    Procedure: CHOLECYSTECTOMY, LAPAROSCOPIC;  Surgeon: Forrest Veronica MD;  Location: [facility];  Service: General;  Laterality: N/A;    LUNG  LOBECTOMY  1966    right middle lobectomy, due to Tb    OOPHORECTOMY      SHOULDER SURGERY      francis     THYROIDECTOMY Bilateral 5/19/2021    Procedure: THYROIDECTOMY;  Surgeon: Jamia Amezquita MD;  Location: [facility] OR [address] [place] [address];  Service: General;  Laterality: Bilateral;    THYROIDECTOMY Bilateral 9/13/2021    Procedure: THYROIDECTOMY WITH INTRA-OP PTH;  Surgeon: Ryan Torres MD;  Location: [facility] OR [address] [place] [address];  Service: ENT;  Laterality: Bilateral;  NIM tube  Intraop PTH         Current Outpatient Medications:     amLODIPine (NORVASC) 2.5 MG tablet, Take 1 tablet by mouth once daily, Disp: 90 tablet, Rfl: 0    blood sugar diagnostic (BLOOD GLUCOSE TEST) Strp, True Metrix glucose strips check once daily, Disp: 100 each, Rfl: 3    doxepin (SINEQUAN) 10 MG capsule, Take 10 mg by mouth every evening., Disp: , Rfl:     ergocalciferol (ERGOCALCIFEROL) 50,000 unit Cap, Take 1 capsule (50,000 Units total) by mouth every 7 days., Disp: 12 capsule, Rfl: 3    gabapentin (NEURONTIN) 100 MG capsule, 1 capsule, Disp: , Rfl:     levothyroxine (SYNTHROID) 112 MCG tablet, Take 1 tablet (112 mcg total) by mouth before breakfast., Disp: 30 tablet, Rfl: 11    LINZESS 290 mcg Cap capsule, Take 290 mcg by mouth once daily., Disp: , Rfl:     losartan (COZAAR) 100 MG tablet, Take 1 tablet (100 mg total) by mouth once daily., Disp: 90 tablet, Rfl: 3    metFORMIN (GLUCOPHAGE-XR) 500 MG ER 24hr tablet, , Disp: , Rfl:     rosuvastatin (CRESTOR) 20 MG tablet, Take 1 tablet (20 mg total) by mouth every evening., Disp: 90 tablet, Rfl: 0    blood-glucose meter kit, True Metrix glucometer check glucose once daily, Disp: 1 each, Rfl: 0    calcium carbonate (TUMS) 200 mg calcium (500 mg) chewable tablet, Chew and swallow 2 tablets (1,000 mg total) by mouth 3 (three) times daily. for 14 days, Disp: 100 tablet, Rfl: 0    clonazePAM (KLONOPIN) 0.5 MG tablet, Take 1 tablet (0.5 mg total) by mouth 2 (two) times daily as needed for  Anxiety., Disp: 10 tablet, Rfl: 0  No current facility-administered medications for this visit.    Facility-Administered Medications Ordered in Other Visits:     electrolyte-S (ISOLYTE), , Intravenous, Continuous, Nilay Jeffries MD, Last Rate: 10 mL/hr at 09/17/20 0950, New Bag at 09/17/20 1155    lactated ringers infusion, , Intravenous, Continuous, Nilay Jeffries MD    Review of patient's allergies indicates:  No Known Allergies    Social History     Socioeconomic History    Marital status:    Tobacco Use    Smoking status: Never Smoker    Smokeless tobacco: Never Used   Substance and Sexual Activity    Alcohol use: Not Currently     Comment: stopped 2019    Drug use: No    Sexual activity: Not Currently       Family History   Problem Relation Age of Onset    Colon cancer Other     Cancer Mother     Hypertension Mother     Hyperlipidemia Mother     Cancer Brother     Hypertension Father     Emphysema Father     Diabetes Son     Hypertension Son     Alcohol abuse Son     Diabetes Maternal Aunt     Alzheimer's disease Maternal Uncle     Cancer Maternal Grandmother     No Known Problems Daughter     Dementia Sister     Alzheimer's disease Sister     Breast cancer Sister 60    Obesity Paternal Uncle     Prostate cancer Other     Melanoma Neg Hx     Psoriasis Neg Hx     Lupus Neg Hx     Eczema Neg Hx     Amblyopia Neg Hx     Blindness Neg Hx     Cataracts Neg Hx     Glaucoma Neg Hx     Macular degeneration Neg Hx     Retinal detachment Neg Hx     Strabismus Neg Hx     Stroke Neg Hx     Thyroid cancer Neg Hx        Review of Systems   Constitutional: Negative for activity change, appetite change, chills, fatigue, fever and unexpected weight change.   HENT: Positive for voice change. Negative for congestion, dental problem, drooling, ear discharge, ear pain, facial swelling, hearing loss, mouth sores, nosebleeds, postnasal drip, rhinorrhea, sinus pressure, sneezing, sore  throat, tinnitus and trouble swallowing.    Eyes: Negative for pain and visual disturbance.   Respiratory: Positive for choking. Negative for cough and shortness of breath.    Cardiovascular: Negative for chest pain, palpitations and leg swelling.   Gastrointestinal: Positive for constipation (she is taking some OTC meds at times for this). Negative for abdominal pain, diarrhea, nausea and vomiting.        Change in caliber of stools   Endocrine: Negative for cold intolerance and heat intolerance.   Genitourinary: Negative for difficulty urinating, dysuria and hematuria.   Musculoskeletal: Positive for arthralgias, back pain, myalgias, neck pain and neck stiffness. Negative for gait problem.   Skin: Negative for rash and wound.   Allergic/Immunologic: Negative for environmental allergies and immunocompromised state.   Neurological: Negative for dizziness, facial asymmetry, speech difficulty, weakness, light-headedness, numbness and headaches.   Hematological: Negative for adenopathy. Does not bruise/bleed easily.   Psychiatric/Behavioral: Negative for dysphoric mood. The patient is not nervous/anxious.        Objective:     Physical Exam  Vitals reviewed.   Constitutional:       General: She is not in acute distress.     Appearance: She is well-developed.   HENT:      Head: Normocephalic and atraumatic.      Jaw: No trismus.      Salivary Glands: Right salivary gland is not diffusely enlarged or tender. Left salivary gland is not diffusely enlarged or tender.      Comments: Salivary glands - there are no lesions or asymmetric findings in the submandibular or parotid glands     Right Ear: Hearing, tympanic membrane, ear canal and external ear normal.      Left Ear: Hearing, tympanic membrane, ear canal and external ear normal.      Nose: No nasal deformity or rhinorrhea.      Mouth/Throat:      Mouth: No oral lesions.      Tongue: No lesions.      Palate: No mass and lesions.      Pharynx: Uvula midline.   Eyes:       General: Lids are normal.      Extraocular Movements: Extraocular movements intact.      Conjunctiva/sclera:      Right eye: Right conjunctiva is not injected. No chemosis.     Left eye: Left conjunctiva is not injected. No chemosis.  Neck:      Thyroid: No thyroid mass or thyromegaly.      Vascular: No JVD.      Trachea: Phonation normal. No tracheal deviation.     Pulmonary:      Effort: Pulmonary effort is normal. No accessory muscle usage or respiratory distress.      Breath sounds: No stridor.   Chest:   Breasts:      Right: No supraclavicular adenopathy.      Left: No supraclavicular adenopathy.       Musculoskeletal:         General: No tenderness.      Cervical back: Full passive range of motion without pain.   Lymphadenopathy:      Cervical: No cervical adenopathy.      Right cervical: No deep or posterior cervical adenopathy.     Left cervical: No deep or posterior cervical adenopathy.      Upper Body:      Right upper body: No supraclavicular adenopathy.      Left upper body: No supraclavicular adenopathy.   Skin:     Findings: No erythema, lesion or rash.      Nails: There is no clubbing.   Neurological:      Mental Status: She is alert and oriented to person, place, and time.      Cranial Nerves: No cranial nerve deficit or facial asymmetry.      Motor: No weakness.      Gait: Gait normal.   Psychiatric:         Speech: Speech normal.         Behavior: Behavior is cooperative.          Last Tg = 12    Assessment & Plan:       Problem List Items Addressed This Visit     Thyroid cancer     Overall, she is doing ok and improving. 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 The supraclavicular cutaneous nerves, the transverse cervical nerve, and the greater auricular nerve are all dissected in such an operation - some of these cutaneous nerves are salvageable but sometimes the cancer makes them impossible to  save. So, numbness is expected regardless - it can take up to a year to get better. 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 Her brachial plexus was not dissected. She will need to continue working with PT as the spinal accessory nerve can take up to a year to recover.    She has some changes consistent with her COOPER in terms of dry mouth. We discussed preventive measures or conservative measures for handling sialadenitis (lemon drops, warm compresses, local massage).    I await her next Tg and subsequent scans, but the post-COOPER scan showed no evidence of metastatic disease. She will follow up in 6 months, sooner with issues.

## 2022-01-05 ENCOUNTER — TELEPHONE (OUTPATIENT)
Dept: FAMILY MEDICINE | Facility: CLINIC | Age: 77
End: 2022-01-05
Payer: MEDICARE

## 2022-01-05 DIAGNOSIS — R29.898 NECK TIGHTNESS: Primary | ICD-10-CM

## 2022-01-05 DIAGNOSIS — M62.9 MUSCULOSKELETAL DISORDER INVOLVING UPPER TRAPEZIUS MUSCLE: ICD-10-CM

## 2022-01-05 NOTE — TELEPHONE ENCOUNTER
Patient would like to continue her physical therapy that Mrs. Bennett placed back on 07/13/2021, she states that she is at the end of her referral period and needs a new referral.

## 2022-01-06 ENCOUNTER — OFFICE VISIT (OUTPATIENT)
Dept: ENDOCRINOLOGY | Facility: CLINIC | Age: 77
End: 2022-01-06
Payer: MEDICARE

## 2022-01-06 VITALS
WEIGHT: 150.13 LBS | BODY MASS INDEX: 25.01 KG/M2 | SYSTOLIC BLOOD PRESSURE: 132 MMHG | HEART RATE: 87 BPM | DIASTOLIC BLOOD PRESSURE: 64 MMHG | HEIGHT: 65 IN

## 2022-01-06 DIAGNOSIS — C73 THYROID CANCER: ICD-10-CM

## 2022-01-06 DIAGNOSIS — E89.0 POST-SURGICAL HYPOTHYROIDISM: ICD-10-CM

## 2022-01-06 DIAGNOSIS — E55.9 VITAMIN D INSUFFICIENCY: ICD-10-CM

## 2022-01-06 DIAGNOSIS — C73 MALIGNANT NEOPLASM OF THYROID GLAND: Primary | ICD-10-CM

## 2022-01-06 DIAGNOSIS — E89.0 POSTOPERATIVE HYPOTHYROIDISM: ICD-10-CM

## 2022-01-06 DIAGNOSIS — M85.88 OSTEOPENIA OF LUMBAR SPINE: ICD-10-CM

## 2022-01-06 PROCEDURE — 99499 RISK ADDL DX/OHS AUDIT: ICD-10-PCS | Mod: S$GLB,,, | Performed by: INTERNAL MEDICINE

## 2022-01-06 PROCEDURE — 1159F MED LIST DOCD IN RCRD: CPT | Mod: CPTII,S$GLB,, | Performed by: INTERNAL MEDICINE

## 2022-01-06 PROCEDURE — 99214 PR OFFICE/OUTPT VISIT, EST, LEVL IV, 30-39 MIN: ICD-10-PCS | Mod: S$GLB,,, | Performed by: INTERNAL MEDICINE

## 2022-01-06 PROCEDURE — 3078F PR MOST RECENT DIASTOLIC BLOOD PRESSURE < 80 MM HG: ICD-10-PCS | Mod: CPTII,S$GLB,, | Performed by: INTERNAL MEDICINE

## 2022-01-06 PROCEDURE — 1101F PR PT FALLS ASSESS DOC 0-1 FALLS W/OUT INJ PAST YR: ICD-10-PCS | Mod: CPTII,S$GLB,, | Performed by: INTERNAL MEDICINE

## 2022-01-06 PROCEDURE — 1125F PR PAIN SEVERITY QUANTIFIED, PAIN PRESENT: ICD-10-PCS | Mod: CPTII,S$GLB,, | Performed by: INTERNAL MEDICINE

## 2022-01-06 PROCEDURE — 1101F PT FALLS ASSESS-DOCD LE1/YR: CPT | Mod: CPTII,S$GLB,, | Performed by: INTERNAL MEDICINE

## 2022-01-06 PROCEDURE — 3078F DIAST BP <80 MM HG: CPT | Mod: CPTII,S$GLB,, | Performed by: INTERNAL MEDICINE

## 2022-01-06 PROCEDURE — 3075F PR MOST RECENT SYSTOLIC BLOOD PRESS GE 130-139MM HG: ICD-10-PCS | Mod: CPTII,S$GLB,, | Performed by: INTERNAL MEDICINE

## 2022-01-06 PROCEDURE — 1160F PR REVIEW ALL MEDS BY PRESCRIBER/CLIN PHARMACIST DOCUMENTED: ICD-10-PCS | Mod: CPTII,S$GLB,, | Performed by: INTERNAL MEDICINE

## 2022-01-06 PROCEDURE — 99999 PR PBB SHADOW E&M-EST. PATIENT-LVL IV: CPT | Mod: PBBFAC,,, | Performed by: INTERNAL MEDICINE

## 2022-01-06 PROCEDURE — 99499 UNLISTED E&M SERVICE: CPT | Mod: S$GLB,,, | Performed by: INTERNAL MEDICINE

## 2022-01-06 PROCEDURE — 1125F AMNT PAIN NOTED PAIN PRSNT: CPT | Mod: CPTII,S$GLB,, | Performed by: INTERNAL MEDICINE

## 2022-01-06 PROCEDURE — 99214 OFFICE O/P EST MOD 30 MIN: CPT | Mod: S$GLB,,, | Performed by: INTERNAL MEDICINE

## 2022-01-06 PROCEDURE — 1159F PR MEDICATION LIST DOCUMENTED IN MEDICAL RECORD: ICD-10-PCS | Mod: CPTII,S$GLB,, | Performed by: INTERNAL MEDICINE

## 2022-01-06 PROCEDURE — 1160F RVW MEDS BY RX/DR IN RCRD: CPT | Mod: CPTII,S$GLB,, | Performed by: INTERNAL MEDICINE

## 2022-01-06 PROCEDURE — 3288F PR FALLS RISK ASSESSMENT DOCUMENTED: ICD-10-PCS | Mod: CPTII,S$GLB,, | Performed by: INTERNAL MEDICINE

## 2022-01-06 PROCEDURE — 3288F FALL RISK ASSESSMENT DOCD: CPT | Mod: CPTII,S$GLB,, | Performed by: INTERNAL MEDICINE

## 2022-01-06 PROCEDURE — 99999 PR PBB SHADOW E&M-EST. PATIENT-LVL IV: ICD-10-PCS | Mod: PBBFAC,,, | Performed by: INTERNAL MEDICINE

## 2022-01-06 PROCEDURE — 3075F SYST BP GE 130 - 139MM HG: CPT | Mod: CPTII,S$GLB,, | Performed by: INTERNAL MEDICINE

## 2022-01-06 RX ORDER — ERGOCALCIFEROL 1.25 MG/1
50000 CAPSULE ORAL
Qty: 3 CAPSULE | Refills: 3 | Status: SHIPPED | OUTPATIENT
Start: 2022-01-06 | End: 2022-10-03 | Stop reason: SDUPTHER

## 2022-01-06 NOTE — PROGRESS NOTES
Of note, pt prefers phone call with results. Doesn't check portal all the time.    Subjective:      Chief Complaint: Thyroid Cancer and Hypothyroidism    HPI: Erica Masterson is a 76 y.o. female who is here for a follow-up evaluation for thyroid. Last seen 7/2021.    For her thyroid cancer:   Nodules were found several years ago. Since at least 2019. Established care in endocrine here 3/2021 after repeat US still showed MNG.  Recommended FNA, was done 4/29/2021 showing PTC    Treated with total thyroidectomy on 5/2021.   Path: 2 cm, positive margin on the left. Also multifocal (4 foci, 2-5 mm)   right lobe: benign   no lymph nodes assessed   No angioinvasion, no no lymphatic invasion  Some tumor remained in situ, densely adherent/invading the trachea and RLN.    Last neck US: 3/2021, before surgery.    Post-op thyroglobulin: high, 91.   Presented at tumor board.  consensus was try for repeat surgery then radioactive iodine. Surgery team recommended CT to evaluate for LN.  CT done, found several abnormal lymph nodes, concerning for malignancy.  Saw ENT, had repeat surgery 9/13/2021 5/30 LN + for malignancy.    Interval history:  Treated with radioactive iodine 168.7 mCi on 12/15/2021.  Thyrogen-stimulated thyroglobulin level was 12 (TSH 92)  Post-treatment WBS 12/22/2021. Uptake in the neck, no distant disease noted.    Still has some neck tightness, following with ENT, pending PT    Last thyroglobulin:  Lab Results   Component Value Date    THYGLBTUM 12 (H) 12/14/2021    THGABSCRN <1.8 12/14/2021     With regards to her post-surgical hypothyroidism:  Current medication:  generic levothyroxine  Current dose: 112 mcg daily    Pt takes thyroid medication properly, in the early morning on an empty stomach with water. Denies missed doses/running out.    Lab Results   Component Value Date    TSH 92.916 (H) 12/14/2021    FREET4 1.50 12/14/2021     Vitamin D: was 11 (4/2021)    Last labs:    Lab Results   Component  Value Date    CALCIUM 8.7 12/30/2021    ALBUMIN 3.6 10/04/2021    CREATININE 1.0 12/30/2021    FIFITOHM52JW 50 10/04/2021    PTH 35.0 10/04/2021      Now on 50,000 units/week    Bones: DXA 4/2021. Osteopenia. -2.4 spine    Current symptoms:  No   Yes  [x]    []  Trouble swallowing  [x]    []  Trouble breathing  []    [x]  Voice changes. After surgery.  [x]    []  Neck swelling    [x]    []  Fatigue  []    [x]  Constipation/diarrhea. For a while.  [x]    []  Heat/Cold intolerance  [x]    []  Weight gain or weight loss  [x]    []  Palpitations or tremor    Sometimes cold in the legs.    +neck discomfort, tightness, on the left side. Increased after last surgery. Seeing neurology, orthopedics, pending PT.   worse when laying down. Went to ER a few times, normal imaging/exam there.    No falls/fractures.      Reviewed past medical, family, social history and updated as appropriate.    Review of Systems  As above    Objective:     Vitals:    01/06/22 0954   BP: 132/64   Pulse: 87     BP Readings from Last 5 Encounters:   01/06/22 132/64   01/04/22 137/79   12/30/21 134/63   12/29/21 (!) 130/58   12/10/21 117/66     Physical Exam  Vitals reviewed.   Constitutional:       General: She is not in acute distress.  Neck:      Thyroid: No thyroid mass, thyromegaly or thyroid tenderness.      Comments: Absent thyroid, +scar  Cardiovascular:      Heart sounds: Normal heart sounds.   Pulmonary:      Effort: Pulmonary effort is normal.          Wt Readings from Last 10 Encounters:   01/06/22 0954 68.1 kg (150 lb 2.1 oz)   01/04/22 1057 67.2 kg (148 lb 2.4 oz)   12/30/21 1204 66.2 kg (146 lb)   12/29/21 1033 66.2 kg (146 lb)   12/28/21 2346 66.2 kg (146 lb)   12/10/21 1443 68.2 kg (150 lb 7.4 oz)   12/08/21 0802 68.5 kg (151 lb)   12/02/21 1035 68.5 kg (151 lb)   11/18/21 0817 68.7 kg (151 lb 7.3 oz)   10/20/21 0900 70.1 kg (154 lb 6.9 oz)     Lab Results   Component Value Date    HGBA1C 5.8 (H) 09/13/2021    HGBA1C 5.8 (H)  09/13/2021     Lab Results   Component Value Date    CHOL 163 10/30/2020    HDL 63 10/30/2020    LDLCALC 91.6 10/30/2020    TRIG 42 10/30/2020    CHOLHDL 38.7 10/30/2020     Lab Results   Component Value Date     12/30/2021    K 4.2 12/30/2021     12/30/2021    CO2 27 12/30/2021     12/30/2021    BUN 10 12/30/2021    CREATININE 1.0 12/30/2021    CALCIUM 8.7 12/30/2021    PROT 7.4 10/04/2021    ALBUMIN 3.6 10/04/2021    BILITOT 0.3 10/04/2021    ALKPHOS 46 (L) 10/04/2021    AST 17 10/04/2021    ALT 16 10/04/2021    ANIONGAP 11 12/30/2021    ESTGFRAFRICA >60 12/30/2021    EGFRNONAA 54 (A) 12/30/2021    TSH 92.916 (H) 12/14/2021        Assessment/Plan:     There are no diagnoses linked to this encounter.  Thyroid cancer:   s/p surgery but with residual disease in the neck  Now s/p completion thyroidectomy. Pathology with multiple LN + for cancer    Repeat thyroglobulin still elevated.  Thyrogen stimulated TG 12, not too bad.  Now s/p RAIA with 168.7 mCi.  Post-treatment WBS negative other than neck uptake  Repeat thyroglobulin after 4 months.  For now, goal TSH on the low side.    Post surgical hypothyroidism:  Lab Results   Component Value Date    TSH 92.916 (H) 12/14/2021    FREET4 1.50 12/14/2021     Goal TSH low side of normal   last TSH (before thyrogen) was too low, so decreased dose slightly   continue same for now   recheck with next labs    Osteopenia:   DXA 4/2021 -2.4 tscore in spine. FRAX not elevated.   continue vitamin D intake, calcium   avoid falls   repeat 2023    Vitamin D insufficiency:   improved with supplementation   continue   monitor from time to time        Follow-up 4 months with labs before        Juan M Landrum MD  Endocrinology

## 2022-01-06 NOTE — TELEPHONE ENCOUNTER
I have no information on which to make a referral.  Jonnathan's notes do not even say where the pain is until she reaches an undocumented diagnosis.  Two other referrals for physical therapy were made immediately after this by other people and she is seeing orthopedist and Dr. Lynch with multiple sites of pain.  In addition, there are no notes from the therapist reporting any progress.  I am sorry but I am totally in the dark year with nothing to go on.  I cannot do a referral.

## 2022-01-07 NOTE — TELEPHONE ENCOUNTER
Patient says Dr. Iverson told her to stop PT. She has upcoming mri and follow up with him and Dr. Lynch. Advised her to talk to them about PT and if she needs it.

## 2022-01-10 ENCOUNTER — TELEPHONE (OUTPATIENT)
Dept: ORTHOPEDICS | Facility: CLINIC | Age: 77
End: 2022-01-10
Payer: MEDICARE

## 2022-01-10 DIAGNOSIS — M17.10 ARTHRITIS OF KNEE: Primary | ICD-10-CM

## 2022-01-10 NOTE — TELEPHONE ENCOUNTER
Called to tell patient Dr. Iverson said it is Ok to see her for her L knee at her next visit.    joe

## 2022-01-10 NOTE — TELEPHONE ENCOUNTER
----- Message from Shannan Leroy sent at 1/10/2022  2:18 PM CST -----  Contact: self  Patient has an appt on 1/20 and it is a f/u appt to go over the mri and check her left shoulder, now she wants to know if he can check her right knee.  Please call back at [phone] and thanks

## 2022-01-11 ENCOUNTER — OFFICE VISIT (OUTPATIENT)
Dept: PHYSICAL MEDICINE AND REHAB | Facility: CLINIC | Age: 77
End: 2022-01-11
Payer: MEDICARE

## 2022-01-11 DIAGNOSIS — G52.8 SPINAL ACCESSORY NEUROPATHY: Primary | ICD-10-CM

## 2022-01-11 DIAGNOSIS — M79.2 NERVE PAIN: ICD-10-CM

## 2022-01-11 DIAGNOSIS — M54.12 CERVICAL RADICULOPATHY AT C7: ICD-10-CM

## 2022-01-11 PROCEDURE — 95886 PR EMG COMPLETE, W/ NERVE CONDUCTION STUDIES, 5+ MUSCLES: ICD-10-PCS | Mod: S$GLB,,, | Performed by: PHYSICAL MEDICINE & REHABILITATION

## 2022-01-11 PROCEDURE — 95911 NRV CNDJ TEST 9-10 STUDIES: CPT | Mod: S$GLB,,, | Performed by: PHYSICAL MEDICINE & REHABILITATION

## 2022-01-11 PROCEDURE — 99499 UNLISTED E&M SERVICE: CPT | Mod: S$GLB,,, | Performed by: PHYSICAL MEDICINE & REHABILITATION

## 2022-01-11 PROCEDURE — 95886 MUSC TEST DONE W/N TEST COMP: CPT | Mod: S$GLB,,, | Performed by: PHYSICAL MEDICINE & REHABILITATION

## 2022-01-11 PROCEDURE — 95911 PR NERVE CONDUCTION STUDY; 9-10 STUDIES: ICD-10-PCS | Mod: S$GLB,,, | Performed by: PHYSICAL MEDICINE & REHABILITATION

## 2022-01-11 PROCEDURE — 99499 NO LOS: ICD-10-PCS | Mod: S$GLB,,, | Performed by: PHYSICAL MEDICINE & REHABILITATION

## 2022-01-11 NOTE — PROCEDURES
Procedures         [facility]  Physical Medicine and Rehabilitation   [address] [place] [address], [address]  [place] [address]             Full Name: Erica Masterson YOB: 1945  Patient ID: [id number]      Visit Date: 1/11/2022 10:11  Age: 76 Years 2 Months Old  Examining Physician: Curtis Lynch D.O.       Sensory NCS      Nerve / Sites Rec. Site Onset Lat Peak Lat NP Amp Segments Distance Velocity     ms ms µV  cm m/s   R Median - Digit II (Antidromic)      Wrist Dig II 2.81 3.70 38.8 Wrist - Dig II 14 50   L Median - Digit II (Antidromic)      Wrist Dig II 3.13 4.01 47.9 Wrist - Dig II 14 45   R Ulnar - Digit V (Antidromic)      Wrist Dig V 3.18 4.06 50.5 Wrist - Dig V 14 44   L Ulnar - Digit V (Antidromic)      Wrist Dig V 3.59 4.48 44.7 Wrist - Dig V 14 39   R Radial - Anatomical snuff box (Forearm)      Forearm Wrist 2.34 2.92 16.8 Forearm - Wrist 10 43       Motor NCS      Nerve / Sites Muscle Latency Amplitude Amp % Duration Segments Distance Lat Diff Velocity     ms mV % ms  cm ms m/s   R Median - APB      Wrist APB 3.59 9.2 100 6.51 Wrist - APB 8        Elbow APB 6.93 10.1 110 6.77 Elbow - Wrist 20 3.33 60   L Median - APB      Wrist APB 3.65 8.1 100 7.24 Wrist - APB 8        Elbow APB 6.98 12.5 154 7.45 Elbow - Wrist 20 3.33 60   R Ulnar - ADM      Wrist ADM 3.23 9.7 100 6.56 Wrist - ADM 8        B.Elbow ADM 6.15 11.8 122 6.93 B.Elbow - Wrist 19 2.92 65      A.Elbow ADM 7.71 11.7 120 6.93 A.Elbow - B.Elbow 8 1.56 51   L Ulnar - ADM      Wrist ADM 3.39 9.4 100 9.06 Wrist - ADM 8        B.Elbow ADM 6.72 9.5 101 8.54 B.Elbow - Wrist 18 3.33 54      A.Elbow ADM 8.23 9.9 105 8.59 A.Elbow - B.Elbow 8 1.51 53       EMG Summary Table     Spontaneous MUAP Recruitment   Muscle IA Fib PSW Fasc Other Amp Dur. PPP Pattern   R. Deltoid N None None None . N N N N   R. Biceps brachii N None None None . N N N N   R. Triceps brachii N None None None . N N N N   R. Pronator teres N None None None Myotonia N  1+ 1+ Reduced   R. Extensor indicis proprius N None None None . N 1+ 1+ Reduced   R. First dorsal interosseous N None None None . N N N N   R. Trapezius (upper) N None None None Myotonia N N 1+ Reduced   L. Deltoid N None None None . N N N N   L. Biceps brachii N None None None . N N N N   L. Triceps brachii 2+ None None None . N 1+ 1+ Reduced   L. Pronator teres 2+ None None None . N 1+ 1+ Reduced   L. First dorsal interosseous N None None None . N N N N   L. Cervical paraspinals N None None None .       L. Trapezius (upper) N 2+ 2+ None . 1- 1- 1+ Reduced   L. Extensor indicis proprius 2+ None None None . 1+ 1+ 1+ Reduced       Summary    The motor conduction test was normal in all 4 of the tested nerves: R Median - APB, L Median - APB, R Ulnar - ADM, L Ulnar - ADM.    The sensory conduction test was performed on 5 nerve(s). The results were normal in 1 nerve(s): R Median - Digit II (Antidromic). Results outside the specified normal range were found in 4 nerve(s), as follows:   In the L Median - Digit II (Antidromic) study  o the peak latency result was increased for Wrist stimulation   In the R Ulnar - Digit V (Antidromic) study  o the peak latency result was increased for Wrist stimulation   In the L Ulnar - Digit V (Antidromic) study  o the peak latency result was increased for Wrist stimulation   In the R Radial - Anatomical snuff box (Forearm) study  o the peak latency result was increased for Forearm stimulation    The needle EMG examination was performed in 15 muscles. It was normal in 8 muscle(s): R. Deltoid, R. Biceps brachii, R. Triceps brachii, R. First dorsal interosseous, L. Deltoid, L. Biceps brachii, L. First dorsal interosseous, L. Cervical paraspinals. The study was abnormal in 7 muscle(s), with the following distribution:   Abnormal spontaneous/insertional activity was found in R. Pronator teres, R. Trapezius (upper), L. Triceps brachii, L. Pronator teres, L. Trapezius (upper), L. Extensor  indicis proprius.   The MUP waveform abnormality was found in R. Pronator teres, R. Extensor indicis proprius, R. Trapezius (upper), L. Triceps brachii, L. Pronator teres, L. Trapezius (upper), L. Extensor indicis proprius.   Abnormal interference pattern was found in R. Pronator teres, R. Extensor indicis proprius, R. Trapezius (upper), L. Triceps brachii, L. Pronator teres, L. Trapezius (upper), L. Extensor indicis proprius.          Impression:  Abnormal examination.  There is electrodiagnostic evidence of:  1. 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 A chronic right spinal accessory neuropathy  3. A chronic bilateral C7 radiculopathies with evidence of reinnervation.  There is no evidence of active denervation in the muscles tested of the right or left upper extremities/    Clinical history:  The chief complaint of bilateral neck pain is likely consistent with the above findings.  There is an acute on chronic left and a chronic right spinal accessory neuropathy.  This is not atypical after comprehensive neck dissections.  I advised her that it can take up to 1 year for improvement to occur.  There is also electrodiagnostic evidence of chronic bilateral C7 radiculopathies.  On MRI of the cervical spine from February of 2021 shows a broad-based disc bulge/disc osteophyte complex at C5-6 asymmetric to the left with anterior cord compression and atleast moderate neural foraminal stenosis bilaterally there is also a paracentral disc protrusion at C6-7 with potential anterior cord compression and mild bilateral neural foraminal stenosis.  Clinically, her symptoms could be consistent with spinal accessory neuropathy, but disc bulges with ventral cord contact at C5-6 and C6-7 can cause similar symptoms.    ____________________________  Curtis Lynch D.O.  American Board of Physical Medicine and Rehabilitation  American Board of Pain Medicine  American Board of Electrodiagnostic  Medicine  Registered in Musculoskeletal Ultrasound

## 2022-01-12 ENCOUNTER — TELEPHONE (OUTPATIENT)
Dept: FAMILY MEDICINE | Facility: CLINIC | Age: 77
End: 2022-01-12
Payer: MEDICARE

## 2022-01-12 NOTE — TELEPHONE ENCOUNTER
Patient states while cleaning her nose with a q-tip she noticed small amount of blood. States she first noticed it on Monday, but it continues today. Also states there is a burning and a stinging sensation. Requesting something be called in to pharmacy regarding. Please advise. Thank you.

## 2022-01-12 NOTE — TELEPHONE ENCOUNTER
----- Message from Willian Lara, Patient Care Assistant sent at 1/12/2022  3:57 PM CST -----  Contact: Pt  Type:  RX Refill Request    Who Called:  Pt  Refill or New Rx:  New Rx  RX Name and Strength:  Somethinf for burning and tingling in nose  How is the patient currently taking it? (ex. 1XDay):  As Directed  Is this a 30 day or 90 day RX:  30  Preferred Pharmacy with phone number:    [id number] - [place], [place] [address] [place] [address]  Phone: [phone] Fax: [phone]      Local or Mail Order:  Local  Ordering Provider:  Dr Lucia Monzon Call Back Number:  [phone]    Additional Information:  Please contact pt upon completion-Thank you~

## 2022-01-12 NOTE — TELEPHONE ENCOUNTER
It sounds like it is most likely secondary to trauma.  Keep the Q-tips away from the nose.  Use saline spray to clean the nose if needed.  Anything else needed we will have to check to see what the problem is.  There is a possibility she could have a polyp in the nose but more likely she has traumatized the nasal turbinates with the Q-tip.

## 2022-01-13 ENCOUNTER — TELEPHONE (OUTPATIENT)
Dept: FAMILY MEDICINE | Facility: CLINIC | Age: 77
End: 2022-01-13
Payer: MEDICARE

## 2022-01-13 ENCOUNTER — HOSPITAL ENCOUNTER (OUTPATIENT)
Dept: RADIOLOGY | Facility: HOSPITAL | Age: 77
Discharge: HOME | End: 2022-01-13
Attending: ORTHOPAEDIC SURGERY
Payer: MEDICARE

## 2022-01-13 DIAGNOSIS — G54.0 BRACHIAL PLEXUS DISORDERS: ICD-10-CM

## 2022-01-13 PROCEDURE — A9585 GADOBUTROL INJECTION: HCPCS | Mod: PO | Performed by: ORTHOPAEDIC SURGERY

## 2022-01-13 PROCEDURE — 70543 MRI ORBT/FAC/NCK W/O &W/DYE: CPT | Mod: 26,,, | Performed by: RADIOLOGY

## 2022-01-13 PROCEDURE — 70543 MRI BRACHIAL PLEXUS W WO CONTRAST: ICD-10-PCS | Mod: 26,,, | Performed by: RADIOLOGY

## 2022-01-13 PROCEDURE — 25500020 PHARM REV CODE 255: Mod: PO | Performed by: ORTHOPAEDIC SURGERY

## 2022-01-13 PROCEDURE — 70543 MRI ORBT/FAC/NCK W/O &W/DYE: CPT | Mod: TC,PO

## 2022-01-13 RX ORDER — GADOBUTROL 604.72 MG/ML
6 INJECTION INTRAVENOUS
Status: COMPLETED | OUTPATIENT
Start: 2022-01-13 | End: 2022-01-13

## 2022-01-13 RX ADMIN — GADOBUTROL 6 ML: 604.72 INJECTION INTRAVENOUS at 09:01

## 2022-01-13 NOTE — TELEPHONE ENCOUNTER
----- Message from Joanna Carlos sent at 1/13/2022  1:34 PM CST -----  Type: Needs Medical Advice  Who Called:  patient   Symptoms (please be specific):  nose bleed (very slight)  How long has patient had these symptoms:  today   Pharmacy name and phone #:   [id number] - [place] - [address]  [place] [address]  Phone: [phone] Fax: [phone]  Best Call Back Number: [phone]  Additional Information: please advise-thank you

## 2022-01-13 NOTE — TELEPHONE ENCOUNTER
----- Message from Joanie Jorge sent at 1/13/2022  1:50 PM CST -----  Regarding: same day appt access  Contact: TODD RODRIGUEZ [[id number]]  Type:  Same Day Appointment Request    Caller is requesting a same day appointment.      Name of Caller: TODD RODRIGUEZ [[id number]]  Symptoms: assess both nostrils, rt ear stopped up  Best Call Back Number: [phone] or [phone]  Additional Information:  willing to see any provider

## 2022-01-13 NOTE — TELEPHONE ENCOUNTER
Right ear stopped up- she found a nose spray Azelastine at home and is using that today- appt made 1/19/22.

## 2022-01-17 DIAGNOSIS — E78.5 HYPERLIPIDEMIA ASSOCIATED WITH TYPE 2 DIABETES MELLITUS: ICD-10-CM

## 2022-01-17 DIAGNOSIS — E11.69 HYPERLIPIDEMIA ASSOCIATED WITH TYPE 2 DIABETES MELLITUS: ICD-10-CM

## 2022-01-17 NOTE — TELEPHONE ENCOUNTER
Care Due:                  Date            Visit Type   Department     Provider  --------------------------------------------------------------------------------                                             [facility]  Last Visit: 12-      None         PRACTICE       Narayan Myers                                           [facility]  Next Visit: 01-      None         PRACTICE       Narayan Myers                                                            Last  Test          Frequency    Reason                     Performed    Due Date  --------------------------------------------------------------------------------    Lipid Panel.  12 months..  rosuvastatin.............  Not Found    Overdue    Powered by [employer]. Reference number: [id number].   1/17/2022 8:49:27 AM CST

## 2022-01-18 RX ORDER — ROSUVASTATIN CALCIUM 20 MG/1
TABLET, COATED ORAL
Qty: 90 TABLET | Refills: 0 | Status: SHIPPED | OUTPATIENT
Start: 2022-01-18 | End: 2022-04-26

## 2022-01-19 ENCOUNTER — PES CALL (OUTPATIENT)
Dept: ADMINISTRATIVE | Facility: CLINIC | Age: 77
End: 2022-01-19
Payer: MEDICARE

## 2022-01-20 ENCOUNTER — HOSPITAL ENCOUNTER (OUTPATIENT)
Dept: RADIOLOGY | Facility: HOSPITAL | Age: 77
Discharge: HOME | End: 2022-01-20
Attending: ORTHOPAEDIC SURGERY
Payer: MEDICARE

## 2022-01-20 ENCOUNTER — OFFICE VISIT (OUTPATIENT)
Dept: ORTHOPEDICS | Facility: CLINIC | Age: 77
End: 2022-01-20
Payer: MEDICARE

## 2022-01-20 VITALS — RESPIRATION RATE: 18 BRPM | BODY MASS INDEX: 24.99 KG/M2 | WEIGHT: 150 LBS | HEIGHT: 65 IN

## 2022-01-20 DIAGNOSIS — M17.10 ARTHRITIS OF KNEE: ICD-10-CM

## 2022-01-20 DIAGNOSIS — M25.512 LEFT SHOULDER PAIN, UNSPECIFIED CHRONICITY: Primary | ICD-10-CM

## 2022-01-20 DIAGNOSIS — G52.8 SPINAL ACCESSORY NERVE DISORDER: ICD-10-CM

## 2022-01-20 PROCEDURE — 99213 PR OFFICE/OUTPT VISIT, EST, LEVL III, 20-29 MIN: ICD-10-PCS | Mod: 25,S$GLB,, | Performed by: ORTHOPAEDIC SURGERY

## 2022-01-20 PROCEDURE — 99999 PR PBB SHADOW E&M-EST. PATIENT-LVL IV: ICD-10-PCS | Mod: PBBFAC,,, | Performed by: ORTHOPAEDIC SURGERY

## 2022-01-20 PROCEDURE — 73564 X-RAY EXAM KNEE 4 OR MORE: CPT | Mod: 26,RT,, | Performed by: RADIOLOGY

## 2022-01-20 PROCEDURE — 73562 X-RAY EXAM OF KNEE 3: CPT | Mod: 26,LT,, | Performed by: RADIOLOGY

## 2022-01-20 PROCEDURE — 1159F MED LIST DOCD IN RCRD: CPT | Mod: CPTII,S$GLB,, | Performed by: ORTHOPAEDIC SURGERY

## 2022-01-20 PROCEDURE — 1101F PT FALLS ASSESS-DOCD LE1/YR: CPT | Mod: CPTII,S$GLB,, | Performed by: ORTHOPAEDIC SURGERY

## 2022-01-20 PROCEDURE — 3288F PR FALLS RISK ASSESSMENT DOCUMENTED: ICD-10-PCS | Mod: CPTII,S$GLB,, | Performed by: ORTHOPAEDIC SURGERY

## 2022-01-20 PROCEDURE — 1125F PR PAIN SEVERITY QUANTIFIED, PAIN PRESENT: ICD-10-PCS | Mod: CPTII,S$GLB,, | Performed by: ORTHOPAEDIC SURGERY

## 2022-01-20 PROCEDURE — 20610 DRAIN/INJ JOINT/BURSA W/O US: CPT | Mod: RT,S$GLB,, | Performed by: ORTHOPAEDIC SURGERY

## 2022-01-20 PROCEDURE — 73564 XR KNEE ORTHO RIGHT WITH FLEXION: ICD-10-PCS | Mod: 26,RT,, | Performed by: RADIOLOGY

## 2022-01-20 PROCEDURE — 1159F PR MEDICATION LIST DOCUMENTED IN MEDICAL RECORD: ICD-10-PCS | Mod: CPTII,S$GLB,, | Performed by: ORTHOPAEDIC SURGERY

## 2022-01-20 PROCEDURE — 99213 OFFICE O/P EST LOW 20 MIN: CPT | Mod: 25,S$GLB,, | Performed by: ORTHOPAEDIC SURGERY

## 2022-01-20 PROCEDURE — 99999 PR PBB SHADOW E&M-EST. PATIENT-LVL IV: CPT | Mod: PBBFAC,,, | Performed by: ORTHOPAEDIC SURGERY

## 2022-01-20 PROCEDURE — 3288F FALL RISK ASSESSMENT DOCD: CPT | Mod: CPTII,S$GLB,, | Performed by: ORTHOPAEDIC SURGERY

## 2022-01-20 PROCEDURE — 1101F PR PT FALLS ASSESS DOC 0-1 FALLS W/OUT INJ PAST YR: ICD-10-PCS | Mod: CPTII,S$GLB,, | Performed by: ORTHOPAEDIC SURGERY

## 2022-01-20 PROCEDURE — 1160F RVW MEDS BY RX/DR IN RCRD: CPT | Mod: CPTII,S$GLB,, | Performed by: ORTHOPAEDIC SURGERY

## 2022-01-20 PROCEDURE — 20610 LARGE JOINT ASPIRATION/INJECTION: R KNEE: ICD-10-PCS | Mod: RT,S$GLB,, | Performed by: ORTHOPAEDIC SURGERY

## 2022-01-20 PROCEDURE — 73562 X-RAY EXAM OF KNEE 3: CPT | Mod: 59,TC,PN,LT

## 2022-01-20 PROCEDURE — 1125F AMNT PAIN NOTED PAIN PRSNT: CPT | Mod: CPTII,S$GLB,, | Performed by: ORTHOPAEDIC SURGERY

## 2022-01-20 PROCEDURE — 1160F PR REVIEW ALL MEDS BY PRESCRIBER/CLIN PHARMACIST DOCUMENTED: ICD-10-PCS | Mod: CPTII,S$GLB,, | Performed by: ORTHOPAEDIC SURGERY

## 2022-01-20 PROCEDURE — 73562 XR KNEE ORTHO RIGHT WITH FLEXION: ICD-10-PCS | Mod: 26,LT,, | Performed by: RADIOLOGY

## 2022-01-20 RX ORDER — TRIAMCINOLONE ACETONIDE 40 MG/ML
40 INJECTION, SUSPENSION INTRA-ARTICULAR; INTRAMUSCULAR
Status: DISCONTINUED | OUTPATIENT
Start: 2022-01-20 | End: 2022-01-20 | Stop reason: HOSPADM

## 2022-01-20 RX ADMIN — TRIAMCINOLONE ACETONIDE 40 MG: 40 INJECTION, SUSPENSION INTRA-ARTICULAR; INTRAMUSCULAR at 09:01

## 2022-01-20 NOTE — PROCEDURES
Large Joint Aspiration/Injection: R knee    Date/Time: 1/20/2022 9:30 AM  Performed by: Adriel Iverson MD  Authorized by: Adriel Iverson MD     Consent Done?:  Yes (Verbal)  Indications:  Pain  Site marked: the procedure site was marked    Timeout: prior to procedure the correct patient, procedure, and site was verified    Local anesthetic:  Lidocaine 1% without epinephrine and bupivacaine 0.25% without epinephrine  Anesthetic total (ml):  6      Details:  Needle Size:  20 G  Approach:  Anterolateral  Location:  Knee  Site:  R knee  Medications:  40 mg triamcinolone acetonide 40 mg/mL  Patient tolerance:  Patient tolerated the procedure well with no immediate complications

## 2022-01-20 NOTE — PROGRESS NOTES
Past Medical History:   Diagnosis Date    Allergy     Anxiety     Cataract     Colon polyp     Degenerative arthritis of knee 4/3/2012    Diverticulosis     GERD (gastroesophageal reflux disease)     Hyperlipidemia     Hypertension     Multinodular goiter 3/26/2012    Myopathy, unspecified 1/18/2010    Tuberculosis        Past Surgical History:   Procedure Laterality Date    BREAST BIOPSY Left 2015    benign    COLONOSCOPY  12/2/15    Dr. Britton, multiple polyps, recheck five years-three years if more than 2 polyps are adenomatous    COLONOSCOPY N/A 12/2/2015    COLONOSCOPY N/A 3/20/2019    Procedure: COLONOSCOPY;  Surgeon: Jose Britton MD;  Location: [facility];  Service: Endoscopy;  Laterality: N/A;    COLONOSCOPY N/A 5/20/2020    Dr. Britton; internal hemorrhoids; diverticulosis; polyps removed; repeat in 3 years    CYSTOSCOPY N/A 8/26/2020    Procedure: CYSTOSCOPY;  Surgeon: Charlotte Walters Jr., MD;  Location: [facility];  Service: Urology;  Laterality: N/A;    DISSECTION OF NECK Left 9/13/2021    Procedure: DISSECTION, NECK;  Surgeon: Ryan Torres MD;  Location: [address];  Service: ENT;  Laterality: Left;    ESOPHAGOGASTRODUODENOSCOPY N/A 5/20/2020    Dr. Britotn; small hiatal hernia; gastritis; 8 gastric polyps removed    FOOT SURGERY      HYSTERECTOMY      INTRALUMINAL GASTROINTESTINAL TRACT IMAGING VIA CAPSULE N/A 6/4/2020    Procedure: IMAGING PROCEDURE, GI TRACT, INTRALUMINAL, VIA CAPSULE;  Surgeon: Jose Britton MD;  Location: [facility];  Service: Endoscopy;  Laterality: N/A;    KNEE SURGERY  06/06/2013    right knee tear Dr Iverson     LAPAROSCOPIC CHOLECYSTECTOMY N/A 9/17/2020    Procedure: CHOLECYSTECTOMY, LAPAROSCOPIC;  Surgeon: Forrest Veronica MD;  Location: [facility];  Service: General;  Laterality: N/A;    LUNG LOBECTOMY  1966    right middle lobectomy, due to Tb    OOPHORECTOMY      SHOULDER SURGERY      francis     THYROIDECTOMY Bilateral 5/19/2021     Procedure: THYROIDECTOMY;  Surgeon: Jamia Amezquita MD;  Location: [address];  Service: General;  Laterality: Bilateral;    THYROIDECTOMY Bilateral 9/13/2021    Procedure: THYROIDECTOMY WITH INTRA-OP PTH;  Surgeon: Ryan Torres MD;  Location: [address];  Service: ENT;  Laterality: Bilateral;  NIM tube  Intraop PTH       Current Outpatient Medications   Medication Sig    amLODIPine (NORVASC) 2.5 MG tablet Take 1 tablet by mouth once daily    blood sugar diagnostic (BLOOD GLUCOSE TEST) Strp True Metrix glucose strips check once daily    doxepin (SINEQUAN) 10 MG capsule Take 10 mg by mouth every evening.    ergocalciferol (ERGOCALCIFEROL) 50,000 unit Cap Take 1 capsule (50,000 Units total) by mouth every 30 days.    gabapentin (NEURONTIN) 100 MG capsule 1 capsule    levothyroxine (SYNTHROID) 112 MCG tablet Take 1 tablet (112 mcg total) by mouth before breakfast.    LINZESS 290 mcg Cap capsule Take 290 mcg by mouth once daily.    losartan (COZAAR) 100 MG tablet Take 1 tablet (100 mg total) by mouth once daily.    metFORMIN (GLUCOPHAGE-XR) 500 MG ER 24hr tablet     rosuvastatin (CRESTOR) 20 MG tablet TAKE 1 TABLET BY MOUTH ONCE DAILY IN THE EVENING    blood-glucose meter kit True Metrix glucometer check glucose once daily    calcium carbonate (TUMS) 200 mg calcium (500 mg) chewable tablet Chew and swallow 2 tablets (1,000 mg total) by mouth 3 (three) times daily. for 14 days    clonazePAM (KLONOPIN) 0.5 MG tablet Take 1 tablet (0.5 mg total) by mouth 2 (two) times daily as needed for Anxiety.     No current facility-administered medications for this visit.     Facility-Administered Medications Ordered in Other Visits   Medication    electrolyte-S (ISOLYTE)    lactated ringers infusion       Review of patient's allergies indicates:   Allergen Reactions    No known drug allergies        Family History   Problem Relation Age of Onset    Colon cancer Other     Cancer Mother      Hypertension Mother     Hyperlipidemia Mother     Cancer Brother     Hypertension Father     Emphysema Father     Diabetes Son     Hypertension Son     Alcohol abuse Son     Diabetes Maternal Aunt     Alzheimer's disease Maternal Uncle     Cancer Maternal Grandmother     No Known Problems Daughter     Dementia Sister     Alzheimer's disease Sister     Breast cancer Sister 60    Obesity Paternal Uncle     Prostate cancer Other     Melanoma Neg Hx     Psoriasis Neg Hx     Lupus Neg Hx     Eczema Neg Hx     Amblyopia Neg Hx     Blindness Neg Hx     Cataracts Neg Hx     Glaucoma Neg Hx     Macular degeneration Neg Hx     Retinal detachment Neg Hx     Strabismus Neg Hx     Stroke Neg Hx     Thyroid cancer Neg Hx        Social History     Socioeconomic History    Marital status:    Tobacco Use    Smoking status: Never Smoker    Smokeless tobacco: Never Used   Substance and Sexual Activity    Alcohol use: Not Currently     Comment: stopped 2019    Drug use: No    Sexual activity: Not Currently       Chief Complaint:   Chief Complaint   Patient presents with    Right Knee - Pain    Follow-up     MRI EMG L shoulder       History of present illness: This is a 76 year-old female who is seen for left shoulder pain.  It started after a thyroidectomy on September 13th.  Patient has a lot of pain in the trapezius and neck area.  Extends around the scapula and into the shoulder.  Patient has not been able to sleep in several months.  She is currently in physical therapy but it is not really helping.  Pain at night is significant.  Rates the pain is an 8/10.  She had the MRI of her shoulder which was pretty unremarkable as far as pathology.  She had a little arthritis in a small partial-thickness cuff tear but that does not explain her symptoms very well.  The brachial plexus MRI showed some acute changes within the trapezius without obvious break complex injury.  The nerve conduction  study actually confirmed a spinal accessory nerve injury on the left.  This is consistent with her symptoms.  She is also complaining about pain in right knee which has known arthritis.  Has not had any treatments pain in the knee is a 4/10.    Review of Systems:      Musculoskeletal:  See HPI        Physical Examination:    Vital Signs:    Vitals:    01/20/22 0853   Resp: 18       Body mass index is 24.96 kg/m².    This a well-developed, well nourished patient in no acute distress.  They are alert and oriented and cooperative to examination.  Pt. walks without an antalgic gait.      Examination of the left shoulder shows no rashes or erythema.  There is some obvious drooping of the left shoulder with some scapular protraction. There are no masses, ecchymosis, or atrophy. The patient has full range of motion in forward flexion, external rotation, and internal rotation to the mid T-spine. The patient has moderately positive impingement signs. - Springhill's test. - Speeds test. Nontender to palpation over a.c. joint.Passive range of motion: Forward flexion of 180°, external rotation at 90° of 90°, internal rotation of 50°, and external rotation at 0° of 50°. 2+ radial pulse. Intact axillary, radial, median and ulnar sensation. 4 out of 5 resisted forward flexion, external rotation, and negative lift off test.    Examination of the right knee shows no rashes or erythema. There are no masses ecchymosis or effusion. Patient has full range of motion from 0-130°. Patient is nontender to palpation over lateral joint line and nontender to palpation over the medial joint line. Patient has a - Lachman exam, - anterior drawer exam, and - posterior drawer exam. - Nicole's exam. Knee is stable to varus and valgus stress. 5 out of 5 motor strength. Palpable distal pulses. Intact light touch sensation. Negative Patellofemoral crepitus        X-rays: X-rays of the left shoulder is reviewed which show some AC arthritis otherwise  normal-appearing shoulder x-ray  X-rays of the right knee are ordered and reviewed which show severe lateral compartment arthritis on the right    MRI of the left shoulder:Tendinopathy and partial thickness articular surface tearing of the distal supraspinatus tendon.  AC joint arthrosis.  Mild osteoarthritic changes involving the glenohumeral joint space.     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  This is consistent with a spinal accessory nerve injury.  We will get continue with physical therapy to prevent any scapular winging.  I agreed to give her a cortisone injection for right knee.  Nothing to do at this time.  Will continue to monitor the shoulder.

## 2022-01-24 ENCOUNTER — CLINICAL SUPPORT (OUTPATIENT)
Dept: REHABILITATION | Facility: HOSPITAL | Age: 77
End: 2022-01-24
Attending: ORTHOPAEDIC SURGERY
Payer: MEDICARE

## 2022-01-24 ENCOUNTER — OFFICE VISIT (OUTPATIENT)
Dept: FAMILY MEDICINE | Facility: CLINIC | Age: 77
End: 2022-01-24
Attending: FAMILY MEDICINE
Payer: MEDICARE

## 2022-01-24 VITALS
HEIGHT: 65 IN | BODY MASS INDEX: 24.65 KG/M2 | RESPIRATION RATE: 18 BRPM | HEART RATE: 79 BPM | WEIGHT: 147.94 LBS | TEMPERATURE: 98 F | DIASTOLIC BLOOD PRESSURE: 67 MMHG | OXYGEN SATURATION: 98 % | SYSTOLIC BLOOD PRESSURE: 121 MMHG

## 2022-01-24 DIAGNOSIS — I73.9 PAD (PERIPHERAL ARTERY DISEASE): ICD-10-CM

## 2022-01-24 DIAGNOSIS — N18.31 STAGE 3A CHRONIC KIDNEY DISEASE: ICD-10-CM

## 2022-01-24 DIAGNOSIS — E11.59 HYPERTENSION ASSOCIATED WITH DIABETES: ICD-10-CM

## 2022-01-24 DIAGNOSIS — E11.69 HYPERLIPIDEMIA ASSOCIATED WITH TYPE 2 DIABETES MELLITUS: ICD-10-CM

## 2022-01-24 DIAGNOSIS — G52.8 SPINAL ACCESSORY NERVE DISORDER: ICD-10-CM

## 2022-01-24 DIAGNOSIS — M62.9 MUSCULOSKELETAL DISORDER INVOLVING UPPER TRAPEZIUS MUSCLE: ICD-10-CM

## 2022-01-24 DIAGNOSIS — M53.82 DECREASED RANGE OF MOTION OF INTERVERTEBRAL DISCS OF CERVICAL SPINE: ICD-10-CM

## 2022-01-24 DIAGNOSIS — C73 THYROID CANCER: ICD-10-CM

## 2022-01-24 DIAGNOSIS — E55.9 VITAMIN D DEFICIENCY: ICD-10-CM

## 2022-01-24 DIAGNOSIS — F41.1 GAD (GENERALIZED ANXIETY DISORDER): ICD-10-CM

## 2022-01-24 DIAGNOSIS — I15.2 HYPERTENSION ASSOCIATED WITH DIABETES: ICD-10-CM

## 2022-01-24 DIAGNOSIS — E89.0 POSTOPERATIVE HYPOTHYROIDISM: ICD-10-CM

## 2022-01-24 DIAGNOSIS — G60.9 HEREDITARY AND IDIOPATHIC PERIPHERAL NEUROPATHY: ICD-10-CM

## 2022-01-24 DIAGNOSIS — M25.512 LEFT SHOULDER PAIN, UNSPECIFIED CHRONICITY: ICD-10-CM

## 2022-01-24 DIAGNOSIS — E78.5 HYPERLIPIDEMIA ASSOCIATED WITH TYPE 2 DIABETES MELLITUS: ICD-10-CM

## 2022-01-24 DIAGNOSIS — F32.A DEPRESSION, UNSPECIFIED DEPRESSION TYPE: ICD-10-CM

## 2022-01-24 DIAGNOSIS — R29.898 DECREASED STRENGTH OF UPPER EXTREMITY: ICD-10-CM

## 2022-01-24 DIAGNOSIS — Z00.00 ENCOUNTER FOR PREVENTIVE HEALTH EXAMINATION: Primary | ICD-10-CM

## 2022-01-24 DIAGNOSIS — Z86.010 HISTORY OF COLON POLYPS: ICD-10-CM

## 2022-01-24 DIAGNOSIS — E11.00 TYPE 2 DIABETES MELLITUS WITH HYPEROSMOLARITY WITHOUT COMA, WITHOUT LONG-TERM CURRENT USE OF INSULIN: ICD-10-CM

## 2022-01-24 PROCEDURE — 3078F DIAST BP <80 MM HG: CPT | Mod: CPTII,S$GLB,, | Performed by: FAMILY MEDICINE

## 2022-01-24 PROCEDURE — 3288F FALL RISK ASSESSMENT DOCD: CPT | Mod: CPTII,S$GLB,, | Performed by: FAMILY MEDICINE

## 2022-01-24 PROCEDURE — 1101F PT FALLS ASSESS-DOCD LE1/YR: CPT | Mod: CPTII,S$GLB,, | Performed by: FAMILY MEDICINE

## 2022-01-24 PROCEDURE — 1159F PR MEDICATION LIST DOCUMENTED IN MEDICAL RECORD: ICD-10-PCS | Mod: CPTII,S$GLB,, | Performed by: FAMILY MEDICINE

## 2022-01-24 PROCEDURE — 3288F PR FALLS RISK ASSESSMENT DOCUMENTED: ICD-10-PCS | Mod: CPTII,S$GLB,, | Performed by: FAMILY MEDICINE

## 2022-01-24 PROCEDURE — 99214 OFFICE O/P EST MOD 30 MIN: CPT | Mod: S$GLB,,, | Performed by: FAMILY MEDICINE

## 2022-01-24 PROCEDURE — 97161 PT EVAL LOW COMPLEX 20 MIN: CPT | Mod: PN

## 2022-01-24 PROCEDURE — 1160F RVW MEDS BY RX/DR IN RCRD: CPT | Mod: CPTII,S$GLB,, | Performed by: FAMILY MEDICINE

## 2022-01-24 PROCEDURE — 1125F PR PAIN SEVERITY QUANTIFIED, PAIN PRESENT: ICD-10-PCS | Mod: CPTII,S$GLB,, | Performed by: FAMILY MEDICINE

## 2022-01-24 PROCEDURE — 3078F PR MOST RECENT DIASTOLIC BLOOD PRESSURE < 80 MM HG: ICD-10-PCS | Mod: CPTII,S$GLB,, | Performed by: FAMILY MEDICINE

## 2022-01-24 PROCEDURE — 3074F PR MOST RECENT SYSTOLIC BLOOD PRESSURE < 130 MM HG: ICD-10-PCS | Mod: CPTII,S$GLB,, | Performed by: FAMILY MEDICINE

## 2022-01-24 PROCEDURE — 97110 THERAPEUTIC EXERCISES: CPT | Mod: PN

## 2022-01-24 PROCEDURE — 1160F PR REVIEW ALL MEDS BY PRESCRIBER/CLIN PHARMACIST DOCUMENTED: ICD-10-PCS | Mod: CPTII,S$GLB,, | Performed by: FAMILY MEDICINE

## 2022-01-24 PROCEDURE — 99999 PR PBB SHADOW E&M-EST. PATIENT-LVL V: ICD-10-PCS | Mod: PBBFAC,,, | Performed by: FAMILY MEDICINE

## 2022-01-24 PROCEDURE — 1101F PR PT FALLS ASSESS DOC 0-1 FALLS W/OUT INJ PAST YR: ICD-10-PCS | Mod: CPTII,S$GLB,, | Performed by: FAMILY MEDICINE

## 2022-01-24 PROCEDURE — 1125F AMNT PAIN NOTED PAIN PRSNT: CPT | Mod: CPTII,S$GLB,, | Performed by: FAMILY MEDICINE

## 2022-01-24 PROCEDURE — 99214 PR OFFICE/OUTPT VISIT, EST, LEVL IV, 30-39 MIN: ICD-10-PCS | Mod: S$GLB,,, | Performed by: FAMILY MEDICINE

## 2022-01-24 PROCEDURE — 1159F MED LIST DOCD IN RCRD: CPT | Mod: CPTII,S$GLB,, | Performed by: FAMILY MEDICINE

## 2022-01-24 PROCEDURE — 3074F SYST BP LT 130 MM HG: CPT | Mod: CPTII,S$GLB,, | Performed by: FAMILY MEDICINE

## 2022-01-24 PROCEDURE — 99999 PR PBB SHADOW E&M-EST. PATIENT-LVL V: CPT | Mod: PBBFAC,,, | Performed by: FAMILY MEDICINE

## 2022-01-24 RX ORDER — POTASSIUM CHLORIDE 750 MG/1
10 TABLET, EXTENDED RELEASE ORAL EVERY OTHER DAY
COMMUNITY
Start: 2022-01-15 | End: 2022-04-26

## 2022-01-24 RX ORDER — TIZANIDINE 4 MG/1
4 TABLET ORAL EVERY 6 HOURS PRN
COMMUNITY
End: 2022-06-09

## 2022-01-24 NOTE — PROGRESS NOTES
Subjective:       Patient ID: Erica Masterson is a 76 y.o. female.    Chief Complaint: Annual Exam (Pt states that she is here for her annual exam )    76-year-old female coming in for physical exam and follow-up on multiple medical problems.  She recently had a subtotal thyroidectomy followed by a total thyroidectomy with some damage to the spinal accessory nerve secondary to dissecting it free of thyroid cancer.  She is having some problems with swallowing at night that is provoking anxiety and she has had numerous trips to the emergency room with the result being normal examination is normal imaging and treatment for anxiety.  She recently saw Dr. Torres who explained her condition and the hope is that with time the nerve dysfunction will improve.  Dr. Iverson recently put her on physical therapy for some mild damage to the supraspinatus tendon at its insertion on the shoulder.  She has a history of hereditary and idiopathic peripheral neuropathy, sacroiliac dysfunction, generalized anxiety disorder and depression, peripheral arterial disease, diabetes, hypertension, hyperlipidemia, LVH, stage IIIA chronic kidney disease, iron deficiency anemia, thyroid cancer status post total thyroidectomy with resulting hypothyroidism.  She has vitamin-D deficiency and Dr. Eubanks recently cut her thyroid supplement back to once a month.  She has diverticulosis, reflux, history of colon polyps with her last colonoscopy November 16, 2020 by Dr. Boykin and a three year recheck recommended.  She has chronic low back pain as well as osteoarthritis of the knees.    She complains of some epigastric gas and cramping in the morning but no burning or gnawing pain.    Past Medical History:  No date: Allergy  No date: Anxiety  No date: Cataract  No date: Colon polyp  4/3/2012: Degenerative arthritis of knee  No date: Diverticulosis  No date: GERD (gastroesophageal reflux disease)  No date: Hyperlipidemia  No date: Hypertension  3/26/2012:  Multinodular goiter  1/18/2010: Myopathy, unspecified  No date: Tuberculosis    Past Surgical History:  2015: BREAST BIOPSY; Left      Comment:  benign  12/2/15: COLONOSCOPY      Comment:  Dr. Britton, multiple polyps, recheck five years-three                years if more than 2 polyps are adenomatous  12/2/2015: COLONOSCOPY; N/A  3/20/2019: COLONOSCOPY; N/A      Comment:  Procedure: COLONOSCOPY;  Surgeon: Jose Britton MD;                Location: [facility];  Service: Endoscopy;  Laterality:                N/A;  5/20/2020: COLONOSCOPY; N/A      Comment:  Dr. Britton; internal hemorrhoids; diverticulosis;                polyps removed; repeat in 3 years  8/26/2020: CYSTOSCOPY; N/A      Comment:  Procedure: CYSTOSCOPY;  Surgeon: Charlotte Walters Jr., MD;               Location: [facility];  Service: Urology;  Laterality: N/A;  9/13/2021: DISSECTION OF NECK; Left      Comment:  Procedure: DISSECTION, NECK;  Surgeon: Ryan Torres MD;  Location: [address];  Service: ENT;                 Laterality: Left;  5/20/2020: ESOPHAGOGASTRODUODENOSCOPY; N/A      Comment:  Dr. Britton; small hiatal hernia; gastritis; 8 gastric                polyps removed  No date: FOOT SURGERY  No date: HYSTERECTOMY  6/4/2020: INTRALUMINAL GASTROINTESTINAL TRACT IMAGING VIA CAPSULE; N/A      Comment:  Procedure: IMAGING PROCEDURE, GI TRACT, INTRALUMINAL,                VIA CAPSULE;  Surgeon: Jose Britton MD;  Location:                [facility];  Service: Endoscopy;  Laterality: N/A;  06/06/2013: KNEE SURGERY      Comment:  right knee tear Dr Iverson   9/17/2020: LAPAROSCOPIC CHOLECYSTECTOMY; N/A      Comment:  Procedure: CHOLECYSTECTOMY, LAPAROSCOPIC;  Surgeon:                Forrest Veronica MD;  Location: [facility];  Service:                General;  Laterality: N/A;  1966: LUNG LOBECTOMY      Comment:  right middle lobectomy, due to Tb  No date: OOPHORECTOMY  No date: SHOULDER SURGERY      Comment:  francis    5/19/2021: THYROIDECTOMY; Bilateral      Comment:  Procedure: THYROIDECTOMY;  Surgeon: Jamia Amezquita MD;  Location: [facility] OR 2ND FLR;  Service: General;                 Laterality: Bilateral;  9/13/2021: THYROIDECTOMY; Bilateral      Comment:  Procedure: THYROIDECTOMY WITH INTRA-OP PTH;  Surgeon:                Ryan Torres MD;  Location: [facility] OR 2ND FLR;  Service:               ENT;  Laterality: Bilateral;  NIM tube  Intraop PTH    Review of patient's family history indicates:  Problem: Colon cancer      Relation: Other          Age of Onset: (Not Specified)  Problem: Cancer      Relation: Mother          Age of Onset: (Not Specified)  Problem: Hypertension      Relation: Mother          Age of Onset: (Not Specified)  Problem: Hyperlipidemia      Relation: Mother          Age of Onset: (Not Specified)  Problem: Cancer      Relation: Brother          Age of Onset: (Not Specified)  Problem: Hypertension      Relation: Father          Age of Onset: (Not Specified)  Problem: Emphysema      Relation: Father          Age of Onset: (Not Specified)  Problem: Diabetes      Relation: Son          Age of Onset: (Not Specified)  Problem: Hypertension      Relation: Son          Age of Onset: (Not Specified)  Problem: Alcohol abuse      Relation: Son          Age of Onset: (Not Specified)  Problem: Diabetes      Relation: Maternal Aunt          Age of Onset: (Not Specified)  Problem: Alzheimer's disease      Relation: Maternal Uncle          Age of Onset: (Not Specified)  Problem: Cancer      Relation: Maternal Grandmother          Age of Onset: (Not Specified)  Problem: No Known Problems      Relation: Daughter          Age of Onset: (Not Specified)  Problem: Dementia      Relation: Sister          Age of Onset: (Not Specified)  Problem: Alzheimer's disease      Relation: Sister          Age of Onset: (Not Specified)  Problem: Breast cancer      Relation: Sister          Age of Onset: 60  Problem:  Obesity      Relation: Paternal Uncle          Age of Onset: (Not Specified)  Problem: Prostate cancer      Relation: Other          Age of Onset: (Not Specified)  Problem: Melanoma      Relation: Neg Hx          Age of Onset: (Not Specified)  Problem: Psoriasis      Relation: Neg Hx          Age of Onset: (Not Specified)  Problem: Lupus      Relation: Neg Hx          Age of Onset: (Not Specified)  Problem: Eczema      Relation: Neg Hx          Age of Onset: (Not Specified)  Problem: Amblyopia      Relation: Neg Hx          Age of Onset: (Not Specified)  Problem: Blindness      Relation: Neg Hx          Age of Onset: (Not Specified)  Problem: Cataracts      Relation: Neg Hx          Age of Onset: (Not Specified)  Problem: Glaucoma      Relation: Neg Hx          Age of Onset: (Not Specified)  Problem: Macular degeneration      Relation: Neg Hx          Age of Onset: (Not Specified)  Problem: Retinal detachment      Relation: Neg Hx          Age of Onset: (Not Specified)  Problem: Strabismus      Relation: Neg Hx          Age of Onset: (Not Specified)  Problem: Stroke      Relation: Neg Hx          Age of Onset: (Not Specified)  Problem: Thyroid cancer      Relation: Neg Hx          Age of Onset: (Not Specified)    Social History    Tobacco Use      Smoking status: Never Smoker      Smokeless tobacco: Never Used    Alcohol use: Not Currently      Comment: stopped 2019    Drug use: No    Current Outpatient Medications on File Prior to Visit:  amLODIPine (NORVASC) 2.5 MG tablet, Take 1 tablet by mouth once daily, Disp: 90 tablet, Rfl: 0  blood sugar diagnostic (BLOOD GLUCOSE TEST) Strp, True Metrix glucose strips check once daily, Disp: 100 each, Rfl: 3  carboxymethylcellulose sodium (REFRESH OPHT), Apply to eye., Disp: , Rfl:   doxepin (SINEQUAN) 10 MG capsule, Take 10 mg by mouth every evening., Disp: , Rfl:   ergocalciferol (ERGOCALCIFEROL) 50,000 unit Cap, Take 1 capsule (50,000 Units total) by mouth every 30 days.,  Disp: 3 capsule, Rfl: 3  levothyroxine (SYNTHROID) 112 MCG tablet, Take 1 tablet (112 mcg total) by mouth before breakfast., Disp: 30 tablet, Rfl: 11  LINZESS 290 mcg Cap capsule, Take 290 mcg by mouth once daily., Disp: , Rfl:   losartan (COZAAR) 100 MG tablet, Take 1 tablet (100 mg total) by mouth once daily., Disp: 90 tablet, Rfl: 3  potassium chloride (KLOR-CON) 10 MEQ TbSR, Take 10 mEq by mouth every other day., Disp: , Rfl:   rosuvastatin (CRESTOR) 20 MG tablet, TAKE 1 TABLET BY MOUTH ONCE DAILY IN THE EVENING, Disp: 90 tablet, Rfl: 0  sodium chloride (SALINE NASAL) 0.65 % nasal spray, 1 spray by Nasal route as needed for Congestion., Disp: , Rfl:   tiZANidine (ZANAFLEX) 4 MG tablet, Take 4 mg by mouth every 6 (six) hours as needed., Disp: , Rfl:   blood-glucose meter kit, True Metrix glucometer check glucose once daily, Disp: 1 each, Rfl: 0  calcium carbonate (TUMS) 200 mg calcium (500 mg) chewable tablet, Chew and swallow 2 tablets (1,000 mg total) by mouth 3 (three) times daily. for 14 days, Disp: 100 tablet, Rfl: 0  clonazePAM (KLONOPIN) 0.5 MG tablet, Take 1 tablet (0.5 mg total) by mouth 2 (two) times daily as needed for Anxiety., Disp: 10 tablet, Rfl: 0  gabapentin (NEURONTIN) 100 MG capsule, 1 capsule, Disp: , Rfl:   metFORMIN (GLUCOPHAGE-XR) 500 MG ER 24hr tablet, , Disp: , Rfl:     Current Facility-Administered Medications on File Prior to Visit:  electrolyte-S (ISOLYTE), , Intravenous, Continuous, Nilay Jeffries MD, Last Rate: 10 mL/hr at 09/17/20 0950, New Bag at 09/17/20 1155  lactated ringers infusion, , Intravenous, Continuous, Nilay Jeffries MD          Review of Systems   Constitutional: Negative for chills, diaphoresis, fatigue, fever and unexpected weight change.   HENT: Positive for trouble swallowing (Sensation of difficulty swallowing secretions at night but resolves when upright.  She is sleeping on several pillows but that does not seem to help.). Negative for congestion, ear pain,  hearing loss, postnasal drip and sinus pressure.    Eyes: Negative for itching and visual disturbance.   Respiratory: Negative for cough, chest tightness, shortness of breath and wheezing.    Cardiovascular: Negative for chest pain, palpitations and leg swelling.   Gastrointestinal: Positive for abdominal distention and abdominal pain (Epigastric cramping pain and gas). Negative for blood in stool, constipation, diarrhea, nausea and vomiting.   Genitourinary: Negative for dysuria, frequency and hematuria.   Musculoskeletal: Negative for arthralgias, back pain, joint swelling and myalgias.   Neurological: Negative for dizziness and headaches.   Hematological: Negative for adenopathy.   Psychiatric/Behavioral: Negative for sleep disturbance. The patient is not nervous/anxious.        Objective:      Physical Exam  Vitals and nursing note reviewed.   Constitutional:       General: She is not in acute distress.     Appearance: Normal appearance. She is well-developed, normal weight and well-nourished. She is not ill-appearing, toxic-appearing or diaphoretic.      Comments: Good blood pressure control  Normal weight with a BMI of 24.6 she is down 2.6 lb from her last visit with me December 10, 2021   HENT:      Head: Normocephalic and atraumatic.      Right Ear: Tympanic membrane, ear canal and external ear normal. There is no impacted cerumen.      Left Ear: Tympanic membrane, ear canal and external ear normal. There is no impacted cerumen.      Nose: Nose normal. No congestion or rhinorrhea.      Mouth/Throat:      Mouth: Oropharynx is clear and moist. Mucous membranes are moist.      Pharynx: Oropharynx is clear. No oropharyngeal exudate or posterior oropharyngeal erythema.   Eyes:      General: No scleral icterus.        Right eye: No discharge.         Left eye: No discharge.      Extraocular Movements: Extraocular movements intact.      Conjunctiva/sclera: Conjunctivae normal.      Pupils: Pupils are equal, round,  and reactive to light.   Neck:      Thyroid: No thyromegaly.      Vascular: No carotid bruit or JVD.   Cardiovascular:      Rate and Rhythm: Normal rate and regular rhythm.      Pulses: Normal pulses.      Heart sounds: Normal heart sounds. No murmur heard.  No friction rub. No gallop.    Pulmonary:      Effort: Pulmonary effort is normal. No respiratory distress.      Breath sounds: Normal breath sounds. No stridor. No wheezing, rhonchi or rales.   Chest:      Chest wall: No tenderness.   Abdominal:      General: Bowel sounds are normal. There is no distension.      Palpations: Abdomen is soft. There is no mass.      Tenderness: There is no abdominal tenderness. There is no guarding or rebound.      Hernia: No hernia is present.   Musculoskeletal:         General: No swelling, tenderness, deformity, signs of injury or edema. Normal range of motion.      Cervical back: Normal range of motion and neck supple. No rigidity or tenderness.      Right lower leg: No edema.      Left lower leg: No edema.   Lymphadenopathy:      Cervical: No cervical adenopathy.   Skin:     General: Skin is warm and dry.      Coloration: Skin is not jaundiced or pale.      Findings: No bruising, erythema, lesion or rash.   Neurological:      General: No focal deficit present.      Mental Status: She is alert and oriented to person, place, and time. Mental status is at baseline.      Cranial Nerves: No cranial nerve deficit.      Sensory: No sensory deficit.      Motor: No weakness.      Coordination: Coordination normal.      Gait: Gait normal.      Deep Tendon Reflexes: Reflexes are normal and symmetric. Reflexes normal.   Psychiatric:         Mood and Affect: Mood and affect and mood normal.         Behavior: Behavior normal.         Thought Content: Thought content normal.         Judgment: Judgment normal.         Assessment:       1. Encounter for preventive health examination    2. Spinal accessory nerve disorder    3. Musculoskeletal  disorder involving upper trapezius muscle    4. RONNIE (generalized anxiety disorder), 2019    5. Depression, unspecified depression type    6. Hypertension associated with diabetes    7. Type 2 diabetes mellitus with hyperosmolarity without coma, without long-term current use of insulin    8. Postoperative hypothyroidism    9. Thyroid cancer    10. Hereditary and idiopathic peripheral neuropathy    11. Hyperlipidemia associated with type 2 diabetes mellitus, baseline     12. PAD (peripheral artery disease)    13. Stage 3a chronic kidney disease    14. Vitamin D deficiency    15. History of colon polyps    16. BMI 24.0-24.9, adult        Plan:       1. Encounter for preventive health examination    2. Spinal accessory nerve disorder  No treatment available other than time    3. 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 RONNIE (generalized anxiety disorder), 2019  Stable    5. Depression, unspecified depression type  Stable    6. Hypertension associated with diabetes  Good control with no medication changes needed  - Comprehensive Metabolic Panel; Future    7. Type 2 diabetes mellitus with hyperosmolarity without coma, without long-term current use of insulin  Lab Results   Component Value Date    HGBA1C 5.8 (H) 09/13/2021    HGBA1C 5.8 (H) 09/13/2021   \  Will repeat A1c with her thyroid check April 6  - Comprehensive Metabolic Panel; Future  - Lipid Panel; Future  - Hemoglobin A1C; Future  - Microalbumin/Creatinine Ratio, Urine; Future    8. Postoperative hypothyroidism  TSH plan by Dr. Eubanks for April 6    9. Thyroid cancer  Status post initial surgery followed by total thyroidectomy followed by radioactive iodine    10. Hereditary and idiopathic peripheral neuropathy  Stable    11. Hyperlipidemia associated with type 2 diabetes mellitus, baseline   Lab Results   Component Value Date    CHOL 163  10/30/2020    CHOL 267 (H) 06/25/2020    CHOL 170 08/30/2019     Lab Results   Component Value Date    HDL 63 10/30/2020    HDL 56 06/25/2020    HDL 76 (H) 08/30/2019     Lab Results   Component Value Date    LDLCALC 91.6 10/30/2020    LDLCALC 196.2 (H) 06/25/2020    LDLCALC 85.4 08/30/2019     Lab Results   Component Value Date    TRIG 42 10/30/2020    TRIG 74 06/25/2020    TRIG 43 08/30/2019     Lab Results   Component Value Date    CHOLHDL 38.7 10/30/2020    CHOLHDL 21.0 06/25/2020    CHOLHDL 44.7 08/30/2019       - Comprehensive Metabolic Panel; Future  - Lipid Panel; Future    12. PAD (peripheral artery disease)  Asymptomatic    13. Stage 3a chronic kidney disease  BMP  Lab Results   Component Value Date     12/30/2021    K 4.2 12/30/2021     12/30/2021    CO2 27 12/30/2021    BUN 10 12/30/2021    CREATININE 1.0 12/30/2021    CALCIUM 8.7 12/30/2021    ANIONGAP 11 12/30/2021    ESTGFRAFRICA >60 12/30/2021    EGFRNONAA 54 (A) 12/30/2021       - Comprehensive Metabolic Panel; Future    14. Vitamin D deficiency  Dr. Eubanks reduced high does supplement to once monthly    15. History of colon polyps  Repeat colonoscopy due in November 2023    16. BMI 24.0-24.9, adult

## 2022-01-24 NOTE — PLAN OF CARE
"[facility]   Physical Therapy Initial Evaluation     Date: 1/24/2022   Name: Erica Masterson  Clinic Number: [id number]    Therapy Diagnosis:   Encounter Diagnoses   Name Primary?    Left shoulder pain, unspecified chronicity     Decreased range of motion of intervertebral discs of cervical spine     Decreased strength of upper extremity      Physician: Adriel Iverson,*    Physician Orders: PT Eval and Treat   Medical Diagnosis from Referral: M25.512 (ICD-10-CM) - Left shoulder pain, unspecified chronicity  Evaluation Date: 1/24/2022  Authorization Period Expiration: 2/7/2022  Plan of Care Expiration: 4/25/2022  Progress Note Due: 2/24/2022  Visit # / Visits authorized: 1/ 1   FOTO: 0/3    Precautions: Standard, Diabetes, cancer and thyroidectomy     Time In: 1315  Time Out: 1400  Total Appointment Time (timed & untimed codes): 45 minutes      SUBJECTIVE   Date of onset: L upper trap and supraclavicular pain tightness started in May 2021 following thyroid surgery "but it might have started before then and the surgery just brought it all out"    History of current condition - Erica reports: B neck pain prior to surgery. Thyroidectomy on September 13th, but the first surgery was May 13th was for the R side. Thyroidectomy performed to remove the whole tumor. Reports following second surgery tightness upper scapular and tightness in the L chest region. Also reports tightness in the low back and B LEs. Denies numbness and tingling in the L UE but does have some numbness in the upper supraclavicular region into the L facial region per patient. Reports numbness and tingling in the B feet just at night and when she is going to bed. She is prediabetic, but chart review indicate diabetes. Reports tightness of B LEs. Reports saw cardiologist recently and saw PCP this morning and everything checked out okay. Reports night pain every night. Reports she finds keeping the arm in an abducted " "position at night it seems to help. Sometimes when she first wakes up it is so tight. Usually the mornings are the most tight feeling. Denies unexplained weight loss /gain. Denies changes in BB. Pt is R hand dominant.     Falls: 0    Imaging, MRI studies:      Impression:     1. There is asymmetric signal in volume involving the left trapezius muscle which may reflect changes of denervation and edema (spinal accessory neuropathy).  The study is motion degraded which slightly limits evaluation.  There is no other mass or primary process involving the brachial plexus.       IMPRESSION:     Tendinopathy and partial thickness articular surface tearing of the distal supraspinatus tendon.     AC joint arthrosis.     Mild osteoarthritic changes involving the glenohumeral joint space.    Prior Therapy: received therapy at [facility] facility following surgery in Sept. Last tx session was December 9th.   Social History: one story home, raised home lives with their spouse  Occupation: none   Hobbies: enjoys walking/exercising, plays cards and bingo, visits her cousin   Prior Level of Function: independent   Current Level of Function: independent with ADLs, she has difficulty with going up the stairs reports a pulling sensation in the upper L chest region     Pain: comes and goes   Current 7/10, worst 10/10, best 3/10   Location: left shoulder and neck region  Description: tight, chocking feeling   Aggravating Factors: she will awaken from sleep and the L side of the neck is just tight tight. She has been to the ER for this about 4 times.   Easing Factors: prescription for MD tizanidine     Patients goals: " get rid of the tightness in my throat"      Medical History:   Past Medical History:   Diagnosis Date    Allergy     Anxiety     Cataract     Colon polyp     Degenerative arthritis of knee 4/3/2012    Diverticulosis     GERD (gastroesophageal reflux disease)     Hyperlipidemia     Hypertension     Multinodular " "goiter 3/26/2012    Myopathy, unspecified 1/18/2010    Tuberculosis        Surgical History:   Erica Masterson  has a past surgical history that includes Foot surgery; Hysterectomy; Shoulder surgery; Lung lobectomy (1966); Knee surgery (06/06/2013); Oophorectomy; Esophagogastroduodenoscopy (N/A, 5/20/2020); Breast biopsy (Left, 2015); Colonoscopy (12/2/15); Colonoscopy (N/A, 12/2/2015); Colonoscopy (N/A, 3/20/2019); Colonoscopy (N/A, 5/20/2020); Intraluminal gastrointestinal tract imaging via capsule (N/A, 6/4/2020); Cystoscopy (N/A, 8/26/2020); Laparoscopic cholecystectomy (N/A, 9/17/2020); Thyroidectomy (Bilateral, 5/19/2021); Thyroidectomy (Bilateral, 9/13/2021); and Dissection of neck (Left, 9/13/2021).    Medications:   Erica has a current medication list which includes the following prescription(s): amlodipine, blood glucose test, blood-glucose meter, calcium carbonate, carboxymethylcellulose sodium, clonazepam, doxepin, ergocalciferol, gabapentin, levothyroxine, linzess, losartan, metformin, potassium chloride, rosuvastatin, sodium chloride, and tizanidine, and the following Facility-Administered Medications: electrolyte-s (ph 7.4) and lactated ringers.    Allergies:   Review of patient's allergies indicates:  No Known Allergies       OBJECTIVE     Structural/Postural Inspection:     Seated: L shoulder depression, forward head, rounded shoulders,, slight winging of B scaps  Standing: scoliosis present , significant L scap winging with shoulder elevation     Active Range of Motion (AROM)/Passive Range of Motion (PROM):     Cervical AROM (Degrees) PROM (Degrees) Comments   Flexion 55 "tightness at anterior neck region" Pain in the low back region   Extension 35 NT Pain on L side of neck region and low back   Right Side Bend 30 pull Tightness on L side   Left Side Bend 30 pull Tightness of L and R side    Right Rotation 60 Limited  Tightness L side    Left Rotation 60 Limited  Tightness L side      Shoulder ROM " in all directions is WFL  Joint assessment of shoulder: NP    Joint Accessory:    Cervical Mob w/ Endfeel Comments   PA glide Hypomobility upper and lower cervical spine     Right Side-glide Normal     Left Side-glide Normal       Thoracic Mob w/ Endfeel Comments   Posterior/Anterior Hypomobility  Tenderness T4-5   Right 1st rib inferior-glide hypo    Left 1st rib inferior-glide hypo Elevated      Manual Muscle Testing:     RUE Strength Grade LUE Strength Grade   Shoulder Flexion 4/5 Shoulder Flexion 4-/5   Shoulder Extension 4-/5 Shoulder Extension 4-/5   Shoulder Abduction 4/5 Shoulder Abduction 3+/5   Shoulder Adduction NT Shoulder Adduction NT   Shoulder Internal Rotation 4/5 Shoulder Internal Rotation 4/5   Shoulder External Rotation 4/5 Shoulder External Rotation 3+/5   Upper Trap 4+/5 Upper Trap 4/5   Middle Trap 4-/5 Middle Trap 3+/5   Lower Trap 4-/5 Lower Trap 3+/5       Shoulder special test:      empty and full can demonstrate weakness but no pain   O amina: weak no pain   Speed: weak no pain     Movement Analysis:  Minimal to no guarding    Apley scratch test: WFL, no reports of shoulder pain     Palpation for Tenderness:  Positive for tenderness to palpation of B upper trap L >R, scalenes, first rib, T4-5, supraspinatus, levator scap. Significant hypertonicity of L scalenes, levator scap    Sensation: intact sensation to light touch of BUEs peripheral nerves.     Limitation/Restriction for FOTO 1/24/2022 Survey    Therapist reviewed FOTO scores for Erica Masterson on 1/24/2022.   FOTO documents entered into [employer] - see Media section.    Limitation Score: NA%    Assess at first tx session. Previous episode at previous therapy location needs to be closed prior to adding another. Time constraint        TREATMENT     Total Treatment time (time-based codes) separate from Evaluation: 8 minutes      Erica received the treatments listed below:      therapeutic exercises to develop strength, endurance, ROM,  flexibility and posture for 8 minutes including:    Scap retractions   Upper trap and levator stretch   Scalene stretch  Scapular clock SL    PATIENT EDUCATION AND HOME EXERCISES     Education provided:   - HEP  - PT goals and POC     Written Home Exercises Provided: yes. Exercises were reviewed and Erica was able to demonstrate them prior to the end of the session.  Erica demonstrated good  understanding of the education provided. See EMR under Patient Instructions for exercises provided during therapy sessions.    ASSESSMENT     Erica is a 76 y.o. female referred to outpatient Physical Therapy with a medical diagnosis of M25.512 (ICD-10-CM) - Left shoulder pain, unspecified chronicity. Patient presents with c/o of tightness of L lateral and anterior neck region into the L shoulder region. Pt was receiving care at [facility] facility prior to today's evaluation. She ceased therapy 2/2 needing to see orthopedic doctor prior to continuation of therapy. L shoulder ROM is WFL and shoulder special test demonstrate weakness but no pain; therefore, concordant signs and symptoms do not indicate shoulder origin. She demonstrates depression of L shoulder likely 2/2 nerve injury. MMT indicates L shoulder weakness. Hypertonicity and tenderness noted with palpation of L scalenes and levator scap. Also, L 1st rib is elevated likely 2/2 hypertonicity of scalene or possible use of these muscle as accessory breathing muscles as she has history of removal of part of R lung. Significant winging of L scap noted with shoulder elevation. Pt will benefit from periscapular motor control training and strengthening. Pt has difficulty sleeping and experiences night pain daily. Pt will benefit from skilled PT services to address above deficits which will lead to improved functional mobility and quality of life.     Patient prognosis is Good.   Patientt will benefit from skilled outpatient Physical Therapy to address the deficits stated above and in  the chart below, provide patient /family education, and to maximize patientt's level of independence.     Plan of care discussed with patient: Yes  Patient's spiritual, cultural and educational needs considered and patient is agreeable to the plan of care and goals as stated below:     Anticipated Barriers for therapy: history of thyroid surgery     Medical Necessity is demonstrated by the following  History  Co-morbidities and personal factors that may impact the plan of care Co-morbidities:   advanced age, CKD stage 3, diabetes, high BMI, history of cancer and HTN    Personal Factors:   no deficits     high   Examination  Body Structures and Functions, activity limitations and participation restrictions that may impact the plan of care Body Regions:   head  neck  back  upper extremities    Body Systems:    gross symmetry  ROM  strength  gross coordinated movement    Participation Restrictions:   Difficulty sleeping due to pain waking her up at night    Activity limitations:   Learning and applying knowledge  no deficits    General Tasks and Commands  no deficits    Communication  no deficits    Mobility  lifting and carrying objects    Self care  no deficits    Domestic Life  shopping  cooking  doing house work (cleaning house, washing dishes, laundry)    Interactions/Relationships  no deficits    Life Areas  no deficits    Community and Social Life  community life  recreation and leisure         high   Clinical Presentation stable and uncomplicated low   Decision Making/ Complexity Score: low       Goals:    STG  Weeks/Visits Date Established  Goal Status    1. Pt will increase cervical extension AROM to >/= 45 deg to improve her ability to look up  5 weeks/ 10 visits  1/24/2022      2. Pt will increase cervical AROM rotation by 10 deg to improve ability to look over shoulder while driving  5 weeks/ 10 visits  1/24/2022      3.Pt will report improvement in quality of sleep with decreased reports of night pain  since IE to improve quality of life 5 weeks/ 10 visits  1/24/2022      4. FOTO goal 5 weeks/ 10 visits  1/24/2022      5.Pt will demonstrate and verbalize compliance and independence with HEP to improve therapy outcomes  5 weeks/ 10 visits  1/24/2022      6. Pt will report </= 5/10 current pain and </= 7/10 pain for at worst pain over the past week to improve activity tolerance  5 weeks/ 10 visits  1/24/2022        LTG Weeks/Visits Date Established  Goal Status    1.Pt will increase LUE strength to >/= 4/5 to improve ability to lift objects  10 weeks/ 16 visits  1/24/2022      2. Pt will report decreased tightness of neck in the morning to improve functional mobility  10 weeks/ 16 visits  1/24/2022        3.FOTO goal 10 weeks/ 16 visits  1/24/2022      4. Pt will demonstrate improved seated posture to improve  10 weeks/ 16 visits  1/24/2022      5.Pt will report </= 2/10 current pain and </= 4/10 pain for at worst pain over the past week to improve activity tolerance  10 weeks/ 16 visits  1/24/2022          PLAN   Plan of care Certification: 1/24/2022 to 4/25/2022.    Outpatient Physical Therapy 2 times weekly for 6 weeks then 1x/wk for 4 weeks to include the following interventions: Gait Training, Manual Therapy, Moist Heat/ Ice, Neuromuscular Re-ed, Patient Education, Therapeutic Activities and Therapeutic Exercise.     Diamond Hampton, PT      I CERTIFY THE NEED FOR THESE SERVICES FURNISHED UNDER THIS PLAN OF TREATMENT AND WHILE UNDER MY CARE   Physician's comments:     Physician's Signature: ___________________________________________________

## 2022-01-27 ENCOUNTER — CLINICAL SUPPORT (OUTPATIENT)
Dept: REHABILITATION | Facility: HOSPITAL | Age: 77
End: 2022-01-27
Payer: MEDICARE

## 2022-01-27 ENCOUNTER — DOCUMENTATION ONLY (OUTPATIENT)
Dept: REHABILITATION | Facility: HOSPITAL | Age: 77
End: 2022-01-27

## 2022-01-27 DIAGNOSIS — R29.898 DECREASED STRENGTH OF UPPER EXTREMITY: ICD-10-CM

## 2022-01-27 DIAGNOSIS — M53.82 DECREASED RANGE OF MOTION OF INTERVERTEBRAL DISCS OF CERVICAL SPINE: ICD-10-CM

## 2022-01-27 PROCEDURE — 97112 NEUROMUSCULAR REEDUCATION: CPT | Mod: PN,CQ

## 2022-01-27 PROCEDURE — 97140 MANUAL THERAPY 1/> REGIONS: CPT | Mod: PN,CQ

## 2022-01-27 PROCEDURE — 97110 THERAPEUTIC EXERCISES: CPT | Mod: PN,CQ

## 2022-01-27 NOTE — PROGRESS NOTES
[facility]  Outpatient Physical Therapy Daily Treatment Note      Name: Erica Masterson  Clinic Number: [id number]  Visit Date: 1/27/2022    Therapy Diagnosis:   Encounter Diagnoses   Name Primary?    Decreased range of motion of intervertebral discs of cervical spine     Decreased strength of upper extremity        Physician: Adriel Iverson,*   Physician Orders: PT Eval and Treat   Medical Diagnosis from Referral: M25.512 (ICD-10-CM) - Left shoulder pain, unspecified chronicity  Evaluation Date: 1/24/2022  Authorization Period Expiration: 2/7/2022  Plan of Care Expiration: 4/25/2022  Progress Note Due: 2/24/2022  Visit # / Visits authorized: 1/ 5  FOTO: 0/3     Precautions: Standard, Diabetes, cancer and thyroidectomy      Time In: 1015  Time Out: 1130  Total Appointment Time (timed & untimed codes): 45 minutes      Subjective     The patient presents to therapy stated that she has been doing self massage to her neck which seems to be helping.     Response to previous treatment: first treatment following eval   Functional change: first treatment following eval     Pain: 5/10 8/10 when she first woke up.   Location: left neck  and shoulder      Objective     Erica received therapeutic exercises to develop strength, endurance, ROM, flexibility, posture and core stabilization for 20 minutes including:    Nustep 6 minutes  SCM stretch 3 x 30   Levator scap 3 x 30   Cervical AROM rotation 2 x 10   Cervical AROM flexion/ ext 2 x 10        Erica received the following manual therapy techniques: Soft tissue Mobilization were applied to the: cervical region for 10 minutes      Erica participated in neuromuscular re-education activities to improve: Coordination, Kinesthetic, Sense, Proprioception and Posture for 15 minutes. The following activities were included:    Scapular retractions 3 sec hold 2 x 10   Shoulder shrugs 2 x 10   Open books 3 sec hold 15 x each side  performed sidelying   Thread the needle 3 sec hold 15 x each side performed standing     Erica received hot pack for 10 minutes to cervical region.      Patient Education and HEP     She was compliant with home exercise program.    Education provided:   - HEP     Written Home Exercises Provided: Patient instructed to cont prior HEP.  Exercises were reviewed and Erica was able to demonstrate them prior to the end of the session.  Erica demonstrated good  understanding of the education provided.     See EMR under Patient Instructions for exercises provided prior visit.    Assessment     The patient presented to clinic for cervical and shoulder pain. She was able to return demonstration of all TE that were issued for HEP indicating compliance with HEP. She did specifically request soft tissue manipulation stating that this seems to bring her the most relief. Pt was educated on this type of relief being temporary if the soft tissue and body mechanics are not trained for improvement through appropriate therapeutic exercises. Pt was verbally compliant with understanding. She put forth good effort with all exercises and activities and will continue to benefit from skilled therapy to address remaining deficits.     Pt will continue to benefit from skilled outpatient physical therapy to address the deficits listed in the problem list box on initial evaluation, provide pt/family education and to maximize pt's level of independence in the home and community environment.     Erica Is progressing well towards her goals.   Pt prognosis is Good.     Pt's spiritual, cultural and educational needs considered and pt agreeable to plan of care and goals.    Anticipated barriers to physical therapy: history of Thyroid surgery    Goals:    STG  Weeks/Visits Date Established  Goal Status    1. Pt will increase cervical extension AROM to >/= 45 deg to improve her ability to look up  5 weeks/ 10 visits  1/24/2022    In progress  1/27/2022       2. Pt will increase cervical AROM rotation by 10 deg to improve ability to look over shoulder while driving  5 weeks/ 10 visits  1/24/2022    In progress  1/27/2022      3.Pt will report improvement in quality of sleep with decreased reports of night pain since IE to improve quality of life 5 weeks/ 10 visits  1/24/2022    In progress  1/27/2022      4. FOTO goal 5 weeks/ 10 visits  1/24/2022    In progress  1/27/2022     5.Pt will demonstrate and verbalize compliance and independence with HEP to improve therapy outcomes  5 weeks/ 10 visits  1/24/2022    In progress  1/27/2022      6. Pt will report </= 5/10 current pain and </= 7/10 pain for at worst pain over the past week to improve activity tolerance  5 weeks/ 10 visits  1/24/2022    In progress  1/27/2022         LTG Weeks/Visits Date Established  Goal Status    1.Pt will increase LUE strength to >/= 4/5 to improve ability to lift objects  10 weeks/ 16 visits  1/24/2022    In progress  1/27/2022      2. Pt will report decreased tightness of neck in the morning to improve functional mobility  10 weeks/ 16 visits  1/24/2022       In progress  1/27/2022      3.FOTO goal 10 weeks/ 16 visits  1/24/2022    In progress  1/27/2022      4. Pt will demonstrate improved seated posture to improve  10 weeks/ 16 visits  1/24/2022     In progress  1/27/2022     5.Pt will report </= 2/10 current pain and </= 4/10 pain for at worst pain over the past week to improve activity tolerance  10 weeks/ 16 visits  1/24/2022 In progress  1/27/2022           Plan   Plan of care Certification: 1/24/2022 to 4/25/2022.     Outpatient Physical Therapy 2 times weekly for 6 weeks then 1x/wk for 4 weeks     Continue with the plan of care established per initial evaluation    Sammi Rios PTA

## 2022-01-31 ENCOUNTER — CLINICAL SUPPORT (OUTPATIENT)
Dept: REHABILITATION | Facility: HOSPITAL | Age: 77
End: 2022-01-31
Payer: MEDICARE

## 2022-01-31 ENCOUNTER — TELEPHONE (OUTPATIENT)
Dept: OTOLARYNGOLOGY | Facility: CLINIC | Age: 77
End: 2022-01-31
Payer: MEDICARE

## 2022-01-31 DIAGNOSIS — M53.82 DECREASED RANGE OF MOTION OF INTERVERTEBRAL DISCS OF CERVICAL SPINE: ICD-10-CM

## 2022-01-31 DIAGNOSIS — R29.898 DECREASED STRENGTH OF UPPER EXTREMITY: ICD-10-CM

## 2022-01-31 PROCEDURE — 97140 MANUAL THERAPY 1/> REGIONS: CPT | Mod: PN,CQ

## 2022-01-31 PROCEDURE — 97110 THERAPEUTIC EXERCISES: CPT | Mod: PN,CQ

## 2022-01-31 PROCEDURE — 97112 NEUROMUSCULAR REEDUCATION: CPT | Mod: PN,CQ

## 2022-01-31 NOTE — PROGRESS NOTES
[facility]  Outpatient Physical Therapy Daily Treatment Note      Name: Erica Masterson  Clinic Number: [id number]  Visit Date: 1/31/2022    Therapy Diagnosis:   Encounter Diagnoses   Name Primary?    Decreased range of motion of intervertebral discs of cervical spine     Decreased strength of upper extremity        Physician: Adriel Iverson,*   Physician Orders: PT Eval and Treat   Medical Diagnosis from Referral: M25.512 (ICD-10-CM) - Left shoulder pain, unspecified chronicity  Evaluation Date: 1/24/2022  Authorization Period Expiration: 2/7/2022  Plan of Care Expiration: 4/25/2022  Progress Note Due: 2/24/2022  Visit # / Visits authorized: 3/ 5  FOTO: 0/3     Precautions: Standard, Diabetes, cancer and thyroidectomy      Time In: 1030  Time Out: 1120   Total Appointment Time (timed & untimed codes): 40 minutes      Subjective     The patient presents to therapy stated that she has been doing self massage to her neck which seems to be helping.     Response to previous treatment: Positive    Functional change: None stated      Pain: 5/10  Location: left neck  and shoulder      Objective     Erica received therapeutic exercises to develop strength, endurance, ROM, flexibility, posture and core stabilization for 10 minutes including:    Thoracic extensions, x 15   B shoulder ER, 2 x 10   Cervical AROM rotation 2 x 10   Cervical AROM flexion/ ext 2 x 10        Erica received the following manual therapy techniques: Soft tissue Mobilization were applied to the: cervical region for 10 minutes  Seated first rib mobilization  STM to L UT, Levator scapula    Erica participated in neuromuscular re-education activities to improve: Coordination, Kinesthetic, Sense, Proprioception and Posture for 20 minutes. The following activities were included:  Shoulder depression and theratree, 2 x 12 Red   Scapular retractions at theratree 3 sec hold 2 x 7 red   UT shrug with  eccentric control, 2 x 10   Open books 3 sec hold 15 x each side performed sidelying   Thread the needle 3 sec hold 15 x each side performed standing           Patient Education and HEP     She was compliant with home exercise program.    Education provided:   - HEP     Written Home Exercises Provided: Patient instructed to cont prior HEP.  Exercises were reviewed and Erica was able to demonstrate them prior to the end of the session.  Erica demonstrated good  understanding of the education provided.     See EMR under Patient Instructions for exercises provided prior visit.    Assessment   Hypertonicity noted in pt's UT, levator scapula, and scalenes. Elevated 1st rib noted therefore Inferior 1st ribs mobs performed. Pt demo slightly improved cervical rotation to the L following treatment. Pt with good tolerance to therapy session with no adverse effects reported.           Pt will continue to benefit from skilled outpatient physical therapy to address the deficits listed in the problem list box on initial evaluation, provide pt/family education and to maximize pt's level of independence in the home and community environment.     Erica Is progressing well towards her goals.   Pt prognosis is Good.     Pt's spiritual, cultural and educational needs considered and pt agreeable to plan of care and goals.    Anticipated barriers to physical therapy: history of Thyroid surgery    Goals:    STG  Weeks/Visits Date Established  Goal Status    1. Pt will increase cervical extension AROM to >/= 45 deg to improve her ability to look up  5 weeks/ 10 visits  1/24/2022    In progress  1/31/2022      2. Pt will increase cervical AROM rotation by 10 deg to improve ability to look over shoulder while driving  5 weeks/ 10 visits  1/24/2022    In progress  1/31/2022      3.Pt will report improvement in quality of sleep with decreased reports of night pain since IE to improve quality of life 5 weeks/ 10 visits  1/24/2022    In  progress  1/31/2022      4. FOTO goal 5 weeks/ 10 visits  1/24/2022    In progress  1/31/2022     5.Pt will demonstrate and verbalize compliance and independence with HEP to improve therapy outcomes  5 weeks/ 10 visits  1/24/2022    In progress  1/31/2022      6. Pt will report </= 5/10 current pain and </= 7/10 pain for at worst pain over the past week to improve activity tolerance  5 weeks/ 10 visits  1/24/2022    In progress  1/31/2022         LTG Weeks/Visits Date Established  Goal Status    1.Pt will increase LUE strength to >/= 4/5 to improve ability to lift objects  10 weeks/ 16 visits  1/24/2022    In progress  1/31/2022      2. Pt will report decreased tightness of neck in the morning to improve functional mobility  10 weeks/ 16 visits  1/24/2022       In progress  1/31/2022      3.FOTO goal 10 weeks/ 16 visits  1/24/2022    In progress  1/31/2022      4. Pt will demonstrate improved seated posture to improve  10 weeks/ 16 visits  1/24/2022     In progress  1/31/2022     5.Pt will report </= 2/10 current pain and </= 4/10 pain for at worst pain over the past week to improve activity tolerance  10 weeks/ 16 visits  1/24/2022 In progress  1/31/2022           Plan   Plan of care Certification: 1/24/2022 to 4/25/2022.     Outpatient Physical Therapy 2 times weekly for 6 weeks then 1x/wk for 4 weeks     Continue with the plan of care established per initial evaluation    Jimmy Esqueda PTA

## 2022-01-31 NOTE — TELEPHONE ENCOUNTER
"----- Message from Mack Gracia MD sent at 1/31/2022 10:16 AM CST -----  Contact: [phone]  Agreed. Works for me. Thanks.    ----- Message -----  From: Jessica Khalil RN  Sent: 1/31/2022   9:50 AM CST  To: Mack Garcia MD    If she's happy with her voice, then no reason to see you, correct? She can just be "prn"?     ----- Message -----  From: Steffany Tiwari RN  Sent: 1/28/2022   3:02 PM CST  To: Jessica Khalil RN    She wants to know if she should cancel her appt with thompson on feb 14th (since her voice is doing better), or if she should still be seen? She wants to know if its better to just wait and see brian and thompson on the same day in 6 months  ----- Message -----  From: Diana Hutchison  Sent: 1/28/2022   2:01 PM CST  To: Brian JACKSON Staff    Pt would like the nurse to call about appt on 1-4-2022 and she stated on one called her about a follow up appt in 6 months.      Pt would like to know if the appt can be scheduled on the same day as her appt with another dr.  Dr Garcia    [phone]                "

## 2022-02-01 ENCOUNTER — CLINICAL SUPPORT (OUTPATIENT)
Dept: REHABILITATION | Facility: HOSPITAL | Age: 77
End: 2022-02-01
Attending: ORTHOPAEDIC SURGERY
Payer: MEDICARE

## 2022-02-01 DIAGNOSIS — R29.898 DECREASED STRENGTH OF UPPER EXTREMITY: ICD-10-CM

## 2022-02-01 DIAGNOSIS — M53.82 DECREASED RANGE OF MOTION OF INTERVERTEBRAL DISCS OF CERVICAL SPINE: ICD-10-CM

## 2022-02-01 PROCEDURE — 97112 NEUROMUSCULAR REEDUCATION: CPT | Mod: PN,CQ

## 2022-02-01 PROCEDURE — 97110 THERAPEUTIC EXERCISES: CPT | Mod: PN,CQ

## 2022-02-01 NOTE — PROGRESS NOTES
[facility]  Outpatient Physical Therapy Daily Treatment Note      Name: Erica Masterson  Clinic Number: [id number]  Visit Date: 2/1/2022    Therapy Diagnosis:   Encounter Diagnoses   Name Primary?    Decreased range of motion of intervertebral discs of cervical spine     Decreased strength of upper extremity        Physician: No ref. provider found   Physician Orders: PT Eval and Treat   Medical Diagnosis from Referral: M25.512 (ICD-10-CM) - Left shoulder pain, unspecified chronicity  Evaluation Date: 1/24/2022  Authorization Period Expiration: 2/7/2022  Plan of Care Expiration: 4/25/2022  Progress Note Due: 2/24/2022  Visit # / Visits authorized: 1/ 5  FOTO: 0/3     Precautions: Standard, Diabetes, cancer and thyroidectomy      Time In: 1100  Time Out: 1150  Total Appointment Time (timed & untimed codes): 50 minutes      Subjective     States she felt good after therapy but began to feel her muscles tighten after she had to climb some stairs at her home. She reports being tired after last session.      Response to previous treatment: Positive    Functional change: None stated      Pain: 5/10  Location: left neck  and shoulder      Objective     Erica received therapeutic exercises to develop strength, endurance, ROM, flexibility, posture and core stabilization for 17 minutes including:    Thoracic extensions, x 15  Cervical AROM rotation x 10   Cervical AROM flexion/ ext x 10    Thread the needle, x 15 B      Erica received the following manual therapy techniques: Soft tissue Mobilization were applied to the: cervical region for 10 minutes  Seated first rib mobilization  STM to L UT, Levator scapula    Erica participated in neuromuscular re-education activities to improve: Coordination, Kinesthetic, Sense, Proprioception and Posture for 23 minutes. The following activities were included:  Side lying Scapular anterior depression to posterior elevation, 10 reps no  "resistance, 8 with resistance    Side lying Scapular anterio elevation to posterior depression, 10 reps no resistance, 8 with resistance    Scapular retractions, 2 x 10 3" holds   UT shrug with eccentric control, 2 x 10         Patient Education and HEP     She was compliant with home exercise program.    Education provided:   - HEP     Written Home Exercises Provided: Patient instructed to cont prior HEP.  Exercises were reviewed and Erica was able to demonstrate them prior to the end of the session.  Erica demonstrated good  understanding of the education provided.     See EMR under Patient Instructions for exercises provided prior visit.    Assessment   Hypertonicity noted in pt's UT, levator scapula, and scalenes. Elevated 1st rib noted therefore Inferior 1st ribs mobs continued. Scapular PNF performed today to increase UT activity and pt tolerated this well. Pt with good tolerance to therapy session with no adverse effects reported.       Erica Is progressing well towards her goals.   Pt prognosis is Good.     Pt's spiritual, cultural and educational needs considered and pt agreeable to plan of care and goals.    Anticipated barriers to physical therapy: history of Thyroid surgery    Goals:    STG  Weeks/Visits Date Established  Goal Status    1. Pt will increase cervical extension AROM to >/= 45 deg to improve her ability to look up  5 weeks/ 10 visits  1/24/2022    In progress  2/1/2022      2. Pt will increase cervical AROM rotation by 10 deg to improve ability to look over shoulder while driving  5 weeks/ 10 visits  1/24/2022    In progress  2/1/2022      3.Pt will report improvement in quality of sleep with decreased reports of night pain since IE to improve quality of life 5 weeks/ 10 visits  1/24/2022    In progress  2/1/2022      4. FOTO goal 5 weeks/ 10 visits  1/24/2022    In progress  2/1/2022     5.Pt will demonstrate and verbalize compliance and independence with HEP to improve therapy outcomes  5 " weeks/ 10 visits  1/24/2022    In progress  2/1/2022      6. Pt will report </= 5/10 current pain and </= 7/10 pain for at worst pain over the past week to improve activity tolerance  5 weeks/ 10 visits  1/24/2022    In progress  2/1/2022         LTG Weeks/Visits Date Established  Goal Status    1.Pt will increase LUE strength to >/= 4/5 to improve ability to lift objects  10 weeks/ 16 visits  1/24/2022    In progress  2/1/2022      2. Pt will report decreased tightness of neck in the morning to improve functional mobility  10 weeks/ 16 visits  1/24/2022       In progress  2/1/2022      3.FOTO goal 10 weeks/ 16 visits  1/24/2022    In progress  2/1/2022      4. Pt will demonstrate improved seated posture to improve  10 weeks/ 16 visits  1/24/2022     In progress  2/1/2022     5.Pt will report </= 2/10 current pain and </= 4/10 pain for at worst pain over the past week to improve activity tolerance  10 weeks/ 16 visits  1/24/2022 In progress  2/1/2022           Plan   Plan of care Certification: 1/24/2022 to 4/25/2022.     Outpatient Physical Therapy 2 times weekly for 6 weeks then 1x/wk for 4 weeks     Continue with the plan of care established per initial evaluation    Jimmy Esqueda PTA

## 2022-02-02 ENCOUNTER — TELEPHONE (OUTPATIENT)
Dept: ORTHOPEDICS | Facility: CLINIC | Age: 77
End: 2022-02-02
Payer: MEDICARE

## 2022-02-02 NOTE — TELEPHONE ENCOUNTER
----- Message from Marleen Zimmerman MA sent at 2/2/2022  9:27 AM CST -----  Contact: pt  Still going to PT , not helping, shoulder and arm is worse,   Chest is tight, call back  [phone]

## 2022-02-02 NOTE — TELEPHONE ENCOUNTER
Pt states she is still having difficulty with her shoulder. States her shoulder is getting worse. She is doing her physical therapy but states her muscles are still getting really tight. Does not know what else to do. Pt insisted on being seen tomorrow made her an apt. Asked patient about chest tightness she stated that it was more towards her shoulder. Asked pt if she was SOB she denied.

## 2022-02-03 ENCOUNTER — OFFICE VISIT (OUTPATIENT)
Dept: ORTHOPEDICS | Facility: CLINIC | Age: 77
End: 2022-02-03
Payer: MEDICARE

## 2022-02-03 ENCOUNTER — PATIENT OUTREACH (OUTPATIENT)
Dept: ADMINISTRATIVE | Facility: OTHER | Age: 77
End: 2022-02-03
Payer: MEDICARE

## 2022-02-03 VITALS — RESPIRATION RATE: 16 BRPM | WEIGHT: 147 LBS | BODY MASS INDEX: 24.49 KG/M2 | HEIGHT: 65 IN

## 2022-02-03 DIAGNOSIS — M25.512 LEFT SHOULDER PAIN, UNSPECIFIED CHRONICITY: Primary | ICD-10-CM

## 2022-02-03 DIAGNOSIS — G52.8 SPINAL ACCESSORY NERVE DISORDER: ICD-10-CM

## 2022-02-03 DIAGNOSIS — G52.8 SPINAL ACCESSORY NERVE DISORDER: Primary | ICD-10-CM

## 2022-02-03 PROCEDURE — 1159F MED LIST DOCD IN RCRD: CPT | Mod: CPTII,S$GLB,, | Performed by: ORTHOPAEDIC SURGERY

## 2022-02-03 PROCEDURE — 99213 OFFICE O/P EST LOW 20 MIN: CPT | Mod: S$GLB,,, | Performed by: ORTHOPAEDIC SURGERY

## 2022-02-03 PROCEDURE — 99999 PR PBB SHADOW E&M-EST. PATIENT-LVL III: CPT | Mod: PBBFAC,,, | Performed by: ORTHOPAEDIC SURGERY

## 2022-02-03 PROCEDURE — 1160F RVW MEDS BY RX/DR IN RCRD: CPT | Mod: CPTII,S$GLB,, | Performed by: ORTHOPAEDIC SURGERY

## 2022-02-03 PROCEDURE — 99999 PR PBB SHADOW E&M-EST. PATIENT-LVL III: ICD-10-PCS | Mod: PBBFAC,,, | Performed by: ORTHOPAEDIC SURGERY

## 2022-02-03 PROCEDURE — 99213 PR OFFICE/OUTPT VISIT, EST, LEVL III, 20-29 MIN: ICD-10-PCS | Mod: S$GLB,,, | Performed by: ORTHOPAEDIC SURGERY

## 2022-02-03 PROCEDURE — 3288F FALL RISK ASSESSMENT DOCD: CPT | Mod: CPTII,S$GLB,, | Performed by: ORTHOPAEDIC SURGERY

## 2022-02-03 PROCEDURE — 1101F PT FALLS ASSESS-DOCD LE1/YR: CPT | Mod: CPTII,S$GLB,, | Performed by: ORTHOPAEDIC SURGERY

## 2022-02-03 PROCEDURE — 1159F PR MEDICATION LIST DOCUMENTED IN MEDICAL RECORD: ICD-10-PCS | Mod: CPTII,S$GLB,, | Performed by: ORTHOPAEDIC SURGERY

## 2022-02-03 PROCEDURE — 3288F PR FALLS RISK ASSESSMENT DOCUMENTED: ICD-10-PCS | Mod: CPTII,S$GLB,, | Performed by: ORTHOPAEDIC SURGERY

## 2022-02-03 PROCEDURE — 1101F PR PT FALLS ASSESS DOC 0-1 FALLS W/OUT INJ PAST YR: ICD-10-PCS | Mod: CPTII,S$GLB,, | Performed by: ORTHOPAEDIC SURGERY

## 2022-02-03 PROCEDURE — 1160F PR REVIEW ALL MEDS BY PRESCRIBER/CLIN PHARMACIST DOCUMENTED: ICD-10-PCS | Mod: CPTII,S$GLB,, | Performed by: ORTHOPAEDIC SURGERY

## 2022-02-03 RX ORDER — GABAPENTIN 300 MG/1
300 CAPSULE ORAL 3 TIMES DAILY
Qty: 90 CAPSULE | Refills: 0 | Status: SHIPPED | OUTPATIENT
Start: 2022-02-03 | End: 2022-04-26

## 2022-02-03 NOTE — PROGRESS NOTES
Past Medical History:   Diagnosis Date    Allergy     Anxiety     Cataract     Colon polyp     Degenerative arthritis of knee 4/3/2012    Diverticulosis     GERD (gastroesophageal reflux disease)     Hyperlipidemia     Hypertension     Multinodular goiter 3/26/2012    Myopathy, unspecified 1/18/2010    Tuberculosis        Past Surgical History:   Procedure Laterality Date    BREAST BIOPSY Left 2015    benign    COLONOSCOPY  12/2/15    Dr. Britton, multiple polyps, recheck five years-three years if more than 2 polyps are adenomatous    COLONOSCOPY N/A 12/2/2015    COLONOSCOPY N/A 3/20/2019    Procedure: COLONOSCOPY;  Surgeon: Jose Britton MD;  Location: [facility];  Service: Endoscopy;  Laterality: N/A;    COLONOSCOPY N/A 5/20/2020    Dr. Britton; internal hemorrhoids; diverticulosis; polyps removed; repeat in 3 years    CYSTOSCOPY N/A 8/26/2020    Procedure: CYSTOSCOPY;  Surgeon: Charlotte Walters Jr., MD;  Location: [facility];  Service: Urology;  Laterality: N/A;    DISSECTION OF NECK Left 9/13/2021    Procedure: DISSECTION, NECK;  Surgeon: Ryan Torres MD;  Location: [address];  Service: ENT;  Laterality: Left;    ESOPHAGOGASTRODUODENOSCOPY N/A 5/20/2020    Dr. Britton; small hiatal hernia; gastritis; 8 gastric polyps removed    FOOT SURGERY      HYSTERECTOMY      INTRALUMINAL GASTROINTESTINAL TRACT IMAGING VIA CAPSULE N/A 6/4/2020    Procedure: IMAGING PROCEDURE, GI TRACT, INTRALUMINAL, VIA CAPSULE;  Surgeon: Jose Britton MD;  Location: [facility];  Service: Endoscopy;  Laterality: N/A;    KNEE SURGERY  06/06/2013    right knee tear Dr Iverson     LAPAROSCOPIC CHOLECYSTECTOMY N/A 9/17/2020    Procedure: CHOLECYSTECTOMY, LAPAROSCOPIC;  Surgeon: Forrest Veronica MD;  Location: [facility];  Service: General;  Laterality: N/A;    LUNG LOBECTOMY  1966    right middle lobectomy, due to Tb    OOPHORECTOMY      SHOULDER SURGERY      francis     THYROIDECTOMY Bilateral 5/19/2021     Procedure: THYROIDECTOMY;  Surgeon: Jamia Amezquita MD;  Location: [address];  Service: General;  Laterality: Bilateral;    THYROIDECTOMY Bilateral 9/13/2021    Procedure: THYROIDECTOMY WITH INTRA-OP PTH;  Surgeon: Ryan Torres MD;  Location: [address];  Service: ENT;  Laterality: Bilateral;  NIM tube  Intraop PTH       Current Outpatient Medications   Medication Sig    amLODIPine (NORVASC) 2.5 MG tablet Take 1 tablet by mouth once daily    blood sugar diagnostic (BLOOD GLUCOSE TEST) Strp True Metrix glucose strips check once daily    carboxymethylcellulose sodium (REFRESH OPHT) Apply to eye.    doxepin (SINEQUAN) 10 MG capsule Take 10 mg by mouth every evening.    ergocalciferol (ERGOCALCIFEROL) 50,000 unit Cap Take 1 capsule (50,000 Units total) by mouth every 30 days.    gabapentin (NEURONTIN) 100 MG capsule 1 capsule    levothyroxine (SYNTHROID) 112 MCG tablet Take 1 tablet (112 mcg total) by mouth before breakfast.    LINZESS 290 mcg Cap capsule Take 290 mcg by mouth once daily.    losartan (COZAAR) 100 MG tablet Take 1 tablet (100 mg total) by mouth once daily.    metFORMIN (GLUCOPHAGE-XR) 500 MG ER 24hr tablet     potassium chloride (KLOR-CON) 10 MEQ TbSR Take 10 mEq by mouth every other day.    rosuvastatin (CRESTOR) 20 MG tablet TAKE 1 TABLET BY MOUTH ONCE DAILY IN THE EVENING    sodium chloride (OCEAN) 0.65 % nasal spray 1 spray by Nasal route as needed for Congestion.    tiZANidine (ZANAFLEX) 4 MG tablet Take 4 mg by mouth every 6 (six) hours as needed.    blood-glucose meter kit True Metrix glucometer check glucose once daily    calcium carbonate (TUMS) 200 mg calcium (500 mg) chewable tablet Chew and swallow 2 tablets (1,000 mg total) by mouth 3 (three) times daily. for 14 days    clonazePAM (KLONOPIN) 0.5 MG tablet Take 1 tablet (0.5 mg total) by mouth 2 (two) times daily as needed for Anxiety.     No current facility-administered medications for this visit.      Facility-Administered Medications Ordered in Other Visits   Medication    electrolyte-S (ISOLYTE)    lactated ringers infusion       Review of patient's allergies indicates:   Allergen Reactions    No known drug allergies        Family History   Problem Relation Age of Onset    Colon cancer Other     Cancer Mother     Hypertension Mother     Hyperlipidemia Mother     Cancer Brother     Hypertension Father     Emphysema Father     Diabetes Son     Hypertension Son     Alcohol abuse Son     Diabetes Maternal Aunt     Alzheimer's disease Maternal Uncle     Cancer Maternal Grandmother     No Known Problems Daughter     Dementia Sister     Alzheimer's disease Sister     Breast cancer Sister 60    Obesity Paternal Uncle     Prostate cancer Other     Melanoma Neg Hx     Psoriasis Neg Hx     Lupus Neg Hx     Eczema Neg Hx     Amblyopia Neg Hx     Blindness Neg Hx     Cataracts Neg Hx     Glaucoma Neg Hx     Macular degeneration Neg Hx     Retinal detachment Neg Hx     Strabismus Neg Hx     Stroke Neg Hx     Thyroid cancer Neg Hx        Social History     Socioeconomic History    Marital status:    Tobacco Use    Smoking status: Never Smoker    Smokeless tobacco: Never Used   Substance and Sexual Activity    Alcohol use: Not Currently     Comment: stopped 2019    Drug use: No    Sexual activity: Not Currently       Chief Complaint:   Chief Complaint   Patient presents with    Shoulder Pain     fu for left shoulder        History of present illness: This is a 76 year-old female who is seen for left shoulder pain.  It started after a thyroidectomy on September 13th.  Patient has a lot of pain in the trapezius and neck area.  Extends around the scapula and into the shoulder.  Patient has not been able to sleep in several months.  She is currently in physical therapy but it is not really helping.  Pain at night is significant.  Rates the pain is an 8/10.  She had the MRI of  her shoulder which was pretty unremarkable as far as pathology.  She had a little arthritis in a small partial-thickness cuff tear but that does not explain her symptoms very well.  The brachial plexus MRI showed some acute changes within the trapezius without obvious brachial plexus injury.  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  This is consistent with her symptoms.    Review of Systems:      Musculoskeletal:  See HPI        Physical Examination:    Vital Signs:    Vitals:    02/03/22 1047   Resp: 16       Body mass index is 24.46 kg/m².    This a well-developed, well nourished patient in no acute distress.  They are alert and oriented and cooperative to examination.  Pt. walks without an antalgic gait.      Examination of the left shoulder shows no rashes or erythema.  There is some obvious drooping of the left shoulder with some scapular protraction. There are no masses, ecchymosis, or atrophy. The patient has full range of motion in forward flexion, external rotation, and internal rotation to the mid T-spine. The patient has moderately positive impingement signs. - Bairdford's test. - Speeds test. Nontender to palpation over a.c. joint.Passive range of motion: Forward flexion of 180°, external rotation at 90° of 90°, internal rotation of 50°, and external rotation at 0° of 50°. 2+ radial pulse. Intact axillary, radial, median and ulnar sensation. 4 out of 5 resisted forward flexion, external rotation, and negative lift off test.          X-rays: X-rays of the left shoulder is reviewed which show some AC arthritis otherwise normal-appearing shoulder x-ray    MRI of the left shoulder:Tendinopathy and partial thickness articular surface tearing of the distal supraspinatus tendon.  AC joint arthrosis.  Mild osteoarthritic changes involving the glenohumeral joint space.     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  This is consistent with a spinal accessory nerve injury.  We will get continue with physical therapy to prevent any scapular winging.  Recommended some Neurontin to help with the nerve pain.  Gave her prescription for Neurontin 300 milligrams t.i.d..  I will see her back in 6 weeks.

## 2022-02-07 ENCOUNTER — CLINICAL SUPPORT (OUTPATIENT)
Dept: REHABILITATION | Facility: HOSPITAL | Age: 77
End: 2022-02-07
Payer: MEDICARE

## 2022-02-07 DIAGNOSIS — M53.82 DECREASED RANGE OF MOTION OF INTERVERTEBRAL DISCS OF CERVICAL SPINE: ICD-10-CM

## 2022-02-07 DIAGNOSIS — R29.898 DECREASED STRENGTH OF UPPER EXTREMITY: ICD-10-CM

## 2022-02-07 PROCEDURE — 97110 THERAPEUTIC EXERCISES: CPT | Mod: PN

## 2022-02-07 PROCEDURE — 97112 NEUROMUSCULAR REEDUCATION: CPT | Mod: PN

## 2022-02-07 PROCEDURE — 97140 MANUAL THERAPY 1/> REGIONS: CPT | Mod: PN

## 2022-02-07 NOTE — PROGRESS NOTES
[facility]  Outpatient Physical Therapy Daily Treatment Note      Name: Erica Masterson  Clinic Number: [id number]  Visit Date: 2/7/2022    Therapy Diagnosis:   Encounter Diagnoses   Name Primary?    Decreased range of motion of intervertebral discs of cervical spine     Decreased strength of upper extremity        Physician: Adriel Iverson,*   Physician Orders: PT Eval and Treat   Medical Diagnosis from Referral: M25.512 (ICD-10-CM) - Left shoulder pain, unspecified chronicity  Evaluation Date: 1/24/2022  Authorization Period Expiration: 2/7/2022  Plan of Care Expiration: 4/25/2022  Progress Note Due: 2/24/2022  Visit # / Visits authorized: 2 / 5  FOTO: 0/3     Precautions: Standard, Diabetes, cancer and thyroidectomy      Time In: 1030 AM  Time Out: 1115 AM  Total Appointment Time (timed & untimed codes): 45 minutes      Subjective     Reports she laid on her belly for the first time in a long time to try and stretch things out and it felt so good. Reports she is really feeling better. Reports she continues to have a hard time sleeping, but she did pretty good last night. Reports she slept on her belly last night. Saw Dr. Yang due to lump/swelling on R side of the neck. Also saw primary care for LE pains and tingling, they said everything checked out fine.     Response to previous treatment: Positive    Functional change: None stated      Pain: 5/10  Location: left neck  and shoulder      Objective     Erica received therapeutic exercises to develop strength, endurance, ROM, flexibility, posture and core stabilization for 10 minutes including:    Cervical SNAGS each way 10 x 5 sec   Open books for thoracic and cervical rotation 10c each way   Upper trap shrugging with eccentric control LUE only 20x     NP:   Thoracic extensions, x 15  Cervical AROM rotation x 10   Cervical AROM flexion/ ext x 10    Thread the needle, x 15 B    Erica received the  "following manual therapy techniques: Soft tissue Mobilization were applied to the: cervical region for 10 minutes    Supine first rib inferior glide bilaterally  STM to L levator scap     Erica participated in neuromuscular re-education activities to improve: Coordination, Kinesthetic, Sense, Proprioception and Posture for 25 minutes. The following activities were included:    SL scapular clocks 10x  Supine diaphragmatic breathing 15x  Seated diaphragmatic breathing 15x   Serratus wall slides with lift 2 x 10   No money YTB 2 x 10    NP:  Side lying Scapular anterior depression to posterior elevation, 10 reps no resistance, 8 with resistance    Side lying Scapular anterio elevation to posterior depression, 10 reps no resistance, 8 with resistance    Scapular retractions, 2 x 10 3" holds     Patient Education and HEP     She was compliant with home exercise program.    Education provided:   - HEP     Written Home Exercises Provided: Patient instructed to cont prior HEP.  Exercises were reviewed and Erica was able to demonstrate them prior to the end of the session.  Erica demonstrated good  understanding of the education provided.     See EMR under Patient Instructions for exercises provided prior visit.    Assessment     Good tolerance to tx session. No pain reported throughout. Tx session focused on scapular positioning, posture, and decreasing hypertonicity of scalene and levator scap. Performed inferior 1st rib glides to decrease elevation. Elevation of first rib likely 2/2 hyper tone scalenes. Performed bilaterally due to R elevated as well. Added diaphragmatic breathing to improve breathing mechanics and decrease recruitment of accessory breathing muscles. Added serratus wall slide with lift to decrease levator over activity with good form. PT updated HEP to reflect some of the exercises performed today. Pt reported feeling good post tx session.      Erica Is progressing well towards her goals.   Pt prognosis is " Good.     Pt's spiritual, cultural and educational needs considered and pt agreeable to plan of care and goals.    Anticipated barriers to physical therapy: history of Thyroid surgery    Goals:    STG  Weeks/Visits Date Established  Goal Status    1. Pt will increase cervical extension AROM to >/= 45 deg to improve her ability to look up  5 weeks/ 10 visits  1/24/2022    In progress  2/7/2022      2. Pt will increase cervical AROM rotation by 10 deg to improve ability to look over shoulder while driving  5 weeks/ 10 visits  1/24/2022    In progress  2/7/2022      3.Pt will report improvement in quality of sleep with decreased reports of night pain since IE to improve quality of life 5 weeks/ 10 visits  1/24/2022    In progress  2/7/2022      4. FOTO goal 5 weeks/ 10 visits  1/24/2022    In progress  2/7/2022     5.Pt will demonstrate and verbalize compliance and independence with HEP to improve therapy outcomes  5 weeks/ 10 visits  1/24/2022    In progress  2/7/2022      6. Pt will report </= 5/10 current pain and </= 7/10 pain for at worst pain over the past week to improve activity tolerance  5 weeks/ 10 visits  1/24/2022    In progress  2/7/2022         LTG Weeks/Visits Date Established  Goal Status    1.Pt will increase LUE strength to >/= 4/5 to improve ability to lift objects  10 weeks/ 16 visits  1/24/2022    In progress  2/7/2022      2. Pt will report decreased tightness of neck in the morning to improve functional mobility  10 weeks/ 16 visits  1/24/2022       In progress  2/7/2022      3.FOTO goal 10 weeks/ 16 visits  1/24/2022    In progress  2/7/2022      4. Pt will demonstrate improved seated posture to improve  10 weeks/ 16 visits  1/24/2022     In progress  2/7/2022     5.Pt will report </= 2/10 current pain and </= 4/10 pain for at worst pain over the past week to improve activity tolerance  10 weeks/ 16 visits  1/24/2022 In progress  2/7/2022           Plan   Plan of care Certification: 1/24/2022  to 4/25/2022.     Continue to focus on scapular positioning, posture, and shelbie scap strength.    Diamond Hampton, PT

## 2022-02-09 ENCOUNTER — TELEPHONE (OUTPATIENT)
Dept: FAMILY MEDICINE | Facility: CLINIC | Age: 77
End: 2022-02-09
Payer: MEDICARE

## 2022-02-09 ENCOUNTER — TELEPHONE (OUTPATIENT)
Dept: ORTHOPEDICS | Facility: CLINIC | Age: 77
End: 2022-02-09
Payer: MEDICARE

## 2022-02-09 NOTE — TELEPHONE ENCOUNTER
Appointment scheduled for tomorrow's date 2-10-22. Patient agreed to appointment date, time, and location. Location confirmed at the time of call.

## 2022-02-09 NOTE — TELEPHONE ENCOUNTER
Called pt and advised her to call her MD that did her thyroid surgery that her pain is mostly a nerve issue. Pt verbalized understanding

## 2022-02-09 NOTE — TELEPHONE ENCOUNTER
----- Message from Jessica Garcia sent at 2/9/2022 11:30 AM CST -----  Contact: patient  Type: Needs Medical Advice  Who Called:  patient  Symptoms (please be specific):  numbness in feet in legs, stinging in vagina area  How long has patient had these symptoms:  week  Pharmacy name and phone #:    [id number] - [place], [place] - [address]  [place] [address]  Phone: [phone] Fax: [phone]  Best Call Back Number: [phone] (home)   Additional Information: patient is requesting a call back from the nurse- please advise

## 2022-02-09 NOTE — TELEPHONE ENCOUNTER
----- Message from Joanie Jorge sent at 2/9/2022  9:08 AM CST -----  Regarding: Same Day Appointment Request  Contact: TODD RODRIGUEZ [[id number]]  Type:  Same Day Appointment Request    Caller is requesting a same day appointment.      Name of Caller: TODD RODRIGUEZ [[id number]]  Symptoms: pain all over, stiffness in left shoulder, caller stated she have a knot above left shoulder, numbness to legs and toes. Caller stated the pain has worsen.   Best Call Back Number: [phone]

## 2022-02-10 ENCOUNTER — OFFICE VISIT (OUTPATIENT)
Dept: FAMILY MEDICINE | Facility: CLINIC | Age: 77
End: 2022-02-10
Payer: MEDICARE

## 2022-02-10 ENCOUNTER — CLINICAL SUPPORT (OUTPATIENT)
Dept: REHABILITATION | Facility: HOSPITAL | Age: 77
End: 2022-02-10
Payer: MEDICARE

## 2022-02-10 VITALS
BODY MASS INDEX: 24.83 KG/M2 | OXYGEN SATURATION: 98 % | DIASTOLIC BLOOD PRESSURE: 68 MMHG | WEIGHT: 149.06 LBS | SYSTOLIC BLOOD PRESSURE: 126 MMHG | TEMPERATURE: 98 F | HEIGHT: 65 IN | HEART RATE: 82 BPM

## 2022-02-10 DIAGNOSIS — R29.898 DECREASED STRENGTH OF UPPER EXTREMITY: ICD-10-CM

## 2022-02-10 DIAGNOSIS — K62.3 RECTAL PROLAPSE: ICD-10-CM

## 2022-02-10 DIAGNOSIS — M62.89 MUSCLE TIGHTNESS: ICD-10-CM

## 2022-02-10 DIAGNOSIS — R10.2 VAGINAL PAIN: Primary | ICD-10-CM

## 2022-02-10 DIAGNOSIS — K92.1 BLOOD IN STOOL: ICD-10-CM

## 2022-02-10 DIAGNOSIS — R31.21 ASYMPTOMATIC MICROSCOPIC HEMATURIA: ICD-10-CM

## 2022-02-10 DIAGNOSIS — A63.0 GENITAL WARTS: ICD-10-CM

## 2022-02-10 DIAGNOSIS — M53.82 DECREASED RANGE OF MOTION OF INTERVERTEBRAL DISCS OF CERVICAL SPINE: ICD-10-CM

## 2022-02-10 LAB
BACTERIA #/AREA URNS AUTO: ABNORMAL /HPF
BILIRUB SERPL-MCNC: NEGATIVE MG/DL
BILIRUB UR QL STRIP: NEGATIVE
BLOOD URINE, POC: 250
CLARITY UR REFRACT.AUTO: CLEAR
CLARITY, POC UA: CLEAR
COLOR UR AUTO: YELLOW
COLOR, POC UA: YELLOW
GLUCOSE UR QL STRIP: NEGATIVE
GLUCOSE UR QL STRIP: NORMAL
HGB UR QL STRIP: ABNORMAL
HYALINE CASTS UR QL AUTO: 1 /LPF
KETONES UR QL STRIP: NEGATIVE
KETONES UR QL STRIP: NEGATIVE
LEUKOCYTE ESTERASE UR QL STRIP: NEGATIVE
LEUKOCYTE ESTERASE URINE, POC: NORMAL
MICROSCOPIC COMMENT: ABNORMAL
NITRITE UR QL STRIP: NEGATIVE
NITRITE, POC UA: NEGATIVE
PH UR STRIP: 5 [PH] (ref 5–8)
PH, POC UA: 5
PROT UR QL STRIP: NEGATIVE
PROTEIN, POC: NORMAL
RBC #/AREA URNS AUTO: 3 /HPF (ref 0–4)
SP GR UR STRIP: 1.01 (ref 1–1.03)
SPECIFIC GRAVITY, POC UA: 1.02
URN SPEC COLLECT METH UR: ABNORMAL
UROBILINOGEN, POC UA: NORMAL
WBC #/AREA URNS AUTO: 1 /HPF (ref 0–5)

## 2022-02-10 PROCEDURE — 1101F PT FALLS ASSESS-DOCD LE1/YR: CPT | Mod: CPTII,S$GLB,, | Performed by: STUDENT IN AN ORGANIZED HEALTH CARE EDUCATION/TRAINING PROGRAM

## 2022-02-10 PROCEDURE — 97140 MANUAL THERAPY 1/> REGIONS: CPT | Mod: PN,CQ

## 2022-02-10 PROCEDURE — 97112 NEUROMUSCULAR REEDUCATION: CPT | Mod: PN,CQ

## 2022-02-10 PROCEDURE — 3078F DIAST BP <80 MM HG: CPT | Mod: CPTII,S$GLB,, | Performed by: STUDENT IN AN ORGANIZED HEALTH CARE EDUCATION/TRAINING PROGRAM

## 2022-02-10 PROCEDURE — 1101F PR PT FALLS ASSESS DOC 0-1 FALLS W/OUT INJ PAST YR: ICD-10-PCS | Mod: CPTII,S$GLB,, | Performed by: STUDENT IN AN ORGANIZED HEALTH CARE EDUCATION/TRAINING PROGRAM

## 2022-02-10 PROCEDURE — 99999 PR PBB SHADOW E&M-EST. PATIENT-LVL V: ICD-10-PCS | Mod: PBBFAC,,, | Performed by: STUDENT IN AN ORGANIZED HEALTH CARE EDUCATION/TRAINING PROGRAM

## 2022-02-10 PROCEDURE — 3288F FALL RISK ASSESSMENT DOCD: CPT | Mod: CPTII,S$GLB,, | Performed by: STUDENT IN AN ORGANIZED HEALTH CARE EDUCATION/TRAINING PROGRAM

## 2022-02-10 PROCEDURE — 1125F PR PAIN SEVERITY QUANTIFIED, PAIN PRESENT: ICD-10-PCS | Mod: CPTII,S$GLB,, | Performed by: STUDENT IN AN ORGANIZED HEALTH CARE EDUCATION/TRAINING PROGRAM

## 2022-02-10 PROCEDURE — 3288F PR FALLS RISK ASSESSMENT DOCUMENTED: ICD-10-PCS | Mod: CPTII,S$GLB,, | Performed by: STUDENT IN AN ORGANIZED HEALTH CARE EDUCATION/TRAINING PROGRAM

## 2022-02-10 PROCEDURE — 99999 PR PBB SHADOW E&M-EST. PATIENT-LVL V: CPT | Mod: PBBFAC,,, | Performed by: STUDENT IN AN ORGANIZED HEALTH CARE EDUCATION/TRAINING PROGRAM

## 2022-02-10 PROCEDURE — 1125F AMNT PAIN NOTED PAIN PRSNT: CPT | Mod: CPTII,S$GLB,, | Performed by: STUDENT IN AN ORGANIZED HEALTH CARE EDUCATION/TRAINING PROGRAM

## 2022-02-10 PROCEDURE — 3074F SYST BP LT 130 MM HG: CPT | Mod: CPTII,S$GLB,, | Performed by: STUDENT IN AN ORGANIZED HEALTH CARE EDUCATION/TRAINING PROGRAM

## 2022-02-10 PROCEDURE — 1159F PR MEDICATION LIST DOCUMENTED IN MEDICAL RECORD: ICD-10-PCS | Mod: CPTII,S$GLB,, | Performed by: STUDENT IN AN ORGANIZED HEALTH CARE EDUCATION/TRAINING PROGRAM

## 2022-02-10 PROCEDURE — 81002 POCT URINE DIPSTICK WITHOUT MICROSCOPE: ICD-10-PCS | Mod: S$GLB,,, | Performed by: STUDENT IN AN ORGANIZED HEALTH CARE EDUCATION/TRAINING PROGRAM

## 2022-02-10 PROCEDURE — 97110 THERAPEUTIC EXERCISES: CPT | Mod: PN,CQ

## 2022-02-10 PROCEDURE — 3078F PR MOST RECENT DIASTOLIC BLOOD PRESSURE < 80 MM HG: ICD-10-PCS | Mod: CPTII,S$GLB,, | Performed by: STUDENT IN AN ORGANIZED HEALTH CARE EDUCATION/TRAINING PROGRAM

## 2022-02-10 PROCEDURE — 3074F PR MOST RECENT SYSTOLIC BLOOD PRESSURE < 130 MM HG: ICD-10-PCS | Mod: CPTII,S$GLB,, | Performed by: STUDENT IN AN ORGANIZED HEALTH CARE EDUCATION/TRAINING PROGRAM

## 2022-02-10 PROCEDURE — 1159F MED LIST DOCD IN RCRD: CPT | Mod: CPTII,S$GLB,, | Performed by: STUDENT IN AN ORGANIZED HEALTH CARE EDUCATION/TRAINING PROGRAM

## 2022-02-10 PROCEDURE — 81001 URINALYSIS AUTO W/SCOPE: CPT | Performed by: STUDENT IN AN ORGANIZED HEALTH CARE EDUCATION/TRAINING PROGRAM

## 2022-02-10 PROCEDURE — 99215 PR OFFICE/OUTPT VISIT, EST, LEVL V, 40-54 MIN: ICD-10-PCS | Mod: S$GLB,,, | Performed by: STUDENT IN AN ORGANIZED HEALTH CARE EDUCATION/TRAINING PROGRAM

## 2022-02-10 PROCEDURE — 99215 OFFICE O/P EST HI 40 MIN: CPT | Mod: S$GLB,,, | Performed by: STUDENT IN AN ORGANIZED HEALTH CARE EDUCATION/TRAINING PROGRAM

## 2022-02-10 PROCEDURE — 81002 URINALYSIS NONAUTO W/O SCOPE: CPT | Mod: S$GLB,,, | Performed by: STUDENT IN AN ORGANIZED HEALTH CARE EDUCATION/TRAINING PROGRAM

## 2022-02-10 RX ORDER — METHOCARBAMOL 500 MG/1
500 TABLET, FILM COATED ORAL 4 TIMES DAILY
Qty: 40 TABLET | Refills: 0 | Status: SHIPPED | OUTPATIENT
Start: 2022-02-10 | End: 2022-02-20

## 2022-02-10 NOTE — PROGRESS NOTES
"Subjective:       Patient ID: Erica Masterson is a 76 y.o. female.    Chief Complaint: Vaginal Pain    HPI:  76-YO F complains of vaginal pain that is constant and been present for 3 weeks. Denies hematuria, dysuria, recent infections, vaginal itching, discharge, or foul odor. She has not tried anything to relieve her symptoms. She also notes problems with her bowels alternating regular bowel movements and episodes of constipation and cramping. Tried Linzess but had no relief. She notes excessive cramping from gas in the morning. She mentioned some blood on the toilet paper after a bowel movement.     She also notes tightness in multiple parts of the body. She is doing PT for her shoulder. She notes weakness and tingling in her legs upon waking. She notes tightness in her neck feeling "like being choked." Zanaflex has not helped her symptoms.     She had a thyroidectomy done 9-13-21 followed by radioactive iodine (last treatment in Dec). She states that she can still smell the odor of the iodine.     Past Medical History:   Diagnosis Date    Allergy     Anxiety     Cataract     Colon polyp     Degenerative arthritis of knee 4/3/2012    Diverticulosis     GERD (gastroesophageal reflux disease)     Hyperlipidemia     Hypertension     Multinodular goiter 3/26/2012    Myopathy, unspecified 1/18/2010    Tuberculosis        Review of patient's allergies indicates:  No Known Allergies      Review of Systems   Constitutional: Negative for fever.   Eyes: Negative for visual disturbance.   Respiratory: Negative for cough and shortness of breath.    Cardiovascular: Negative for chest pain and palpitations.   Gastrointestinal: Negative for abdominal pain, nausea and vomiting.   Genitourinary: Positive for vaginal pain. Negative for difficulty urinating, dysuria, genital sores, hematuria and vaginal discharge.   Musculoskeletal:        Muscle tightness throughout the body.   Skin: Negative for rash.   Neurological: " "Negative for dizziness and headaches.       Objective:      /68 (BP Location: Right arm, Patient Position: Sitting, BP Method: Medium (Manual))   Pulse 82   Temp 98.3 °F (36.8 °C)   Ht 5' 5" (1.651 m)   Wt 67.6 kg (149 lb 0.5 oz)   SpO2 98%   BMI 24.80 kg/m²     Wt Readings from Last 3 Encounters:   02/10/22 67.6 kg (149 lb 0.5 oz)   02/03/22 66.7 kg (147 lb)   01/24/22 67.1 kg (147 lb 14.9 oz)     BP Readings from Last 3 Encounters:   02/10/22 126/68   01/24/22 121/67   01/06/22 132/64       Physical Exam  Vitals and nursing note reviewed. Exam conducted with a chaperone present.   Constitutional:       Appearance: She is normal weight.   HENT:      Head: Normocephalic.   Eyes:      Conjunctiva/sclera: Conjunctivae normal.   Neck:      Comments: Muscle spasm on L upper sternocleidomastoid  Cardiovascular:      Rate and Rhythm: Normal rate and regular rhythm.      Heart sounds: Normal heart sounds.   Pulmonary:      Effort: Pulmonary effort is normal.      Breath sounds: Normal breath sounds.   Abdominal:      General: Abdomen is flat.      Palpations: Abdomen is soft.   Genitourinary:     Vagina: No vaginal discharge.      Comments: 3 warts on vaginal labia bilaterally.   Internal prolapse of the intestine, not outside of the anus.  Neurological:      General: No focal deficit present.      Mental Status: She is alert and oriented to person, place, and time.   Psychiatric:         Mood and Affect: Mood normal.         Behavior: Behavior normal.         Assessment:       1. Vaginal pain    2. Genital warts    3. Asymptomatic microscopic hematuria    4. Muscle tightness    5. Blood in stool    6. Rectal prolapse        Plan:       Vaginal pain  -     Urinalysis, Reflex to Urine Culture Urine, Clean Catch  -     POCT URINE DIPSTICK WITHOUT MICROSCOPE  -     Ambulatory referral/consult to Obstetrics / Gynecology; Future; Expected date: 02/17/2022    Genital warts    Asymptomatic microscopic hematuria  -     " Ambulatory referral/consult to Obstetrics / Gynecology; Future; Expected date: 02/17/2022    Muscle tightness  -     methocarbamoL (ROBAXIN) 500 MG Tab; Take 1 tablet (500 mg total) by mouth 4 (four) times daily. for 10 days  Dispense: 40 tablet; Refill: 0    Blood in stool  -     Ambulatory referral/consult to Gastroenterology; Future; Expected date: 02/17/2022    Rectal prolapse  -     Ambulatory referral/consult to Gastroenterology; Future; Expected date: 02/17/2022            Medication List with Changes/Refills   New Medications    METHOCARBAMOL (ROBAXIN) 500 MG TAB    Take 1 tablet (500 mg total) by mouth 4 (four) times daily. for 10 days   Current Medications    AMLODIPINE (NORVASC) 2.5 MG TABLET    Take 1 tablet by mouth once daily    BLOOD SUGAR DIAGNOSTIC (BLOOD GLUCOSE TEST) STRP    True Metrix glucose strips check once daily    BLOOD-GLUCOSE METER KIT    True Metrix glucometer check glucose once daily    CALCIUM CARBONATE (TUMS) 200 MG CALCIUM (500 MG) CHEWABLE TABLET    Chew and swallow 2 tablets (1,000 mg total) by mouth 3 (three) times daily. for 14 days    CARBOXYMETHYLCELLULOSE SODIUM (REFRESH OPHT)    Apply to eye.    DOXEPIN (SINEQUAN) 10 MG CAPSULE    Take 10 mg by mouth every evening.    ERGOCALCIFEROL (ERGOCALCIFEROL) 50,000 UNIT CAP    Take 1 capsule (50,000 Units total) by mouth every 30 days.    GABAPENTIN (NEURONTIN) 100 MG CAPSULE    1 capsule    GABAPENTIN (NEURONTIN) 300 MG CAPSULE    Take 1 capsule (300 mg total) by mouth 3 (three) times daily.    LEVOTHYROXINE (SYNTHROID) 112 MCG TABLET    Take 1 tablet (112 mcg total) by mouth before breakfast.    LINZESS 290 MCG CAP CAPSULE    Take 290 mcg by mouth once daily.    LOSARTAN (COZAAR) 100 MG TABLET    Take 1 tablet (100 mg total) by mouth once daily.    METFORMIN (GLUCOPHAGE-XR) 500 MG ER 24HR TABLET        POTASSIUM CHLORIDE (KLOR-CON) 10 MEQ TBSR    Take 10 mEq by mouth every other day.    ROSUVASTATIN (CRESTOR) 20 MG TABLET    TAKE 1  TABLET BY MOUTH ONCE DAILY IN THE EVENING    SODIUM CHLORIDE (OCEAN) 0.65 % NASAL SPRAY    1 spray by Nasal route as needed for Congestion.    TIZANIDINE (ZANAFLEX) 4 MG TABLET    Take 4 mg by mouth every 6 (six) hours as needed.   Discontinued Medications    CLONAZEPAM (KLONOPIN) 0.5 MG TABLET    Take 1 tablet (0.5 mg total) by mouth 2 (two) times daily as needed for Anxiety.     Modified Medications    No medications on file         Alka Felipe PA-C  Family Medicine Physician Assistant       If you are due for any health screening(s) below please notify me so we can arrange them to be ordered and scheduled to maintain your health.     Health Maintenance Due   Topic Date Due    Shingles Vaccine (2 of 3) 02/03/2016    Diabetes Urine Screening  06/25/2021    Lipid Panel  10/30/2021    TETANUS VACCINE  11/02/2021    Foot Exam  01/22/2022         I spent a total of 60minutes on the day of the visit.This includes face to face time and non-face to face time preparing to see the patient (eg, review of tests), obtaining and/or reviewing separately obtained history, documenting clinical information in the electronic or other health record, independently interpreting results and communicating results to the patient/family/caregiver, or care coordinator.      We have addressed [5] High: 1 or more chronic illnesses with severe exacerbation, progression, or side effects of treatment / 1 acute or chronic illness or injury that poses a threat to life or bodily function  The complexity of the data reviewed and analyzed for this visit was [5] Extensive (MUST HAVE 2 OF 3: Reviewed prior external note, ordered unique testing and reviewed the results of each unique test / Independently interpreted a test / Discussed management or interpretation of a test with another provider)  The risk of complications and/or morbidity or mortality are [5] High risk (I.e. Drug therapy requiring intensive monitoring for toxicity / Decision  regarding elective major surgery with identified patient or procedure risk factors / Decision regarding emergency major surgery / Decision regarding hospitalization / Decision not to resuscitate or to de-escalate care because of poor prognosis)   The level of Medical Decision Making for this visit is [5] High

## 2022-02-10 NOTE — PROGRESS NOTES
[facility]  Outpatient Physical Therapy Daily Treatment Note      Name: Erica Masterson  Clinic Number: [id number]  Visit Date: 2/10/2022    Therapy Diagnosis:   Encounter Diagnoses   Name Primary?    Decreased range of motion of intervertebral discs of cervical spine     Decreased strength of upper extremity        Physician: Adriel Iverson,*   Physician Orders: PT Eval and Treat   Medical Diagnosis from Referral: M25.512 (ICD-10-CM) - Left shoulder pain, unspecified chronicity  Evaluation Date: 1/24/2022  Authorization Period Expiration: 2/7/2022  Plan of Care Expiration: 4/25/2022  Progress Note Due: 2/24/2022  Visit # / Visits authorized: 3 / 5  FOTO: 0/3     Precautions: Standard, Diabetes, cancer and thyroidectomy      Time In: 1016  Time Out: 1100   Total Appointment Time (timed & untimed codes): 40 minutes      Subjective   States she feels fine coming in but as soon as she tries to activate and perform UT exercises her mm's begin to tense up from her shoulder into her chest. Also stated she feels some of her face mm's tighten up as well. States she got a prescription for  a stronger mm relaxer.    Response to previous treatment: Positive    Functional change: None stated      Pain: 5/10  Location: left neck  and shoulder      Objective     Erica received therapeutic exercises to develop strength, endurance, ROM, flexibility, posture and core stabilization for 15  minutes including:    Cervical SNAGS each way 10 x 5 sec   Open books for thoracic and cervical rotation 10c each way   Upper trap shrugging with eccentric control LUE only 20x     NP:   Thoracic extensions, x 15  Cervical AROM rotation x 10   Cervical AROM flexion/ ext x 10    Thread the needle, x 15 B    Erica received the following manual therapy techniques: Soft tissue Mobilization were applied to the: cervical region for 8 minutes    Supine first rib inferior glide bilaterally  STM  "to L levator scap   Taping for UT facilitation an LS inhibition      Erica participated in neuromuscular re-education activities to improve: Coordination, Kinesthetic, Sense, Proprioception and Posture for 25 minutes. The following activities were included:    SL scapular clocks 10x  Supine diaphragmatic breathing 15x  Seated diaphragmatic breathing 15x   Serratus wall slides with lift 2 x 10   No money YTB 2 x 10 - np 2* to time     NP:  Side lying Scapular anterior depression to posterior elevation, 10 reps no resistance, 8 with resistance    Side lying Scapular anterio elevation to posterior depression, 10 reps no resistance, 8 with resistance    Scapular retractions, 2 x 10 3" holds     Patient Education and HEP     She was compliant with home exercise program.    Education provided:   - HEP     Written Home Exercises Provided: Patient instructed to cont prior HEP.  Exercises were reviewed and Erica was able to demonstrate them prior to the end of the session.  Erica demonstrated good  understanding of the education provided.     See EMR under Patient Instructions for exercises provided prior visit.    Assessment     Taping for UT facilitation and for levator scapulae inhibition. Session focused on shoulder ROM, scapular control,  and diaphragmatic breathing to limit the use of scalenes during breathing. Pt with good tolerance to therapy session with no adverse effects reported.      Erica Is progressing well towards her goals.   Pt prognosis is Good.     Pt's spiritual, cultural and educational needs considered and pt agreeable to plan of care and goals.    Anticipated barriers to physical therapy: history of Thyroid surgery    Goals:    STG  Weeks/Visits Date Established  Goal Status    1. Pt will increase cervical extension AROM to >/= 45 deg to improve her ability to look up  5 weeks/ 10 visits  1/24/2022    In progress  2/10/2022      2. Pt will increase cervical AROM rotation by 10 deg to improve ability to " look over shoulder while driving  5 weeks/ 10 visits  1/24/2022    In progress  2/10/2022      3.Pt will report improvement in quality of sleep with decreased reports of night pain since IE to improve quality of life 5 weeks/ 10 visits  1/24/2022    In progress  2/10/2022      4. FOTO goal 5 weeks/ 10 visits  1/24/2022    In progress  2/10/2022     5.Pt will demonstrate and verbalize compliance and independence with HEP to improve therapy outcomes  5 weeks/ 10 visits  1/24/2022    In progress  2/10/2022      6. Pt will report </= 5/10 current pain and </= 7/10 pain for at worst pain over the past week to improve activity tolerance  5 weeks/ 10 visits  1/24/2022    In progress  2/10/2022         LTG Weeks/Visits Date Established  Goal Status    1.Pt will increase LUE strength to >/= 4/5 to improve ability to lift objects  10 weeks/ 16 visits  1/24/2022    In progress  2/10/2022      2. Pt will report decreased tightness of neck in the morning to improve functional mobility  10 weeks/ 16 visits  1/24/2022       In progress  2/10/2022      3.FOTO goal 10 weeks/ 16 visits  1/24/2022    In progress  2/10/2022      4. Pt will demonstrate improved seated posture to improve  10 weeks/ 16 visits  1/24/2022     In progress  2/10/2022     5.Pt will report </= 2/10 current pain and </= 4/10 pain for at worst pain over the past week to improve activity tolerance  10 weeks/ 16 visits  1/24/2022 In progress  2/10/2022           Plan   Plan of care Certification: 1/24/2022 to 4/25/2022.     Continue to focus on scapular positioning, posture, and shelbie scap strength.    Jimmy Esqueda, PTA

## 2022-02-11 ENCOUNTER — TELEPHONE (OUTPATIENT)
Dept: FAMILY MEDICINE | Facility: CLINIC | Age: 77
End: 2022-02-11
Payer: MEDICARE

## 2022-02-11 DIAGNOSIS — R31.9 URINARY TRACT INFECTION WITH HEMATURIA, SITE UNSPECIFIED: Primary | ICD-10-CM

## 2022-02-11 DIAGNOSIS — N39.0 URINARY TRACT INFECTION WITH HEMATURIA, SITE UNSPECIFIED: Primary | ICD-10-CM

## 2022-02-11 RX ORDER — CIPROFLOXACIN 250 MG/1
250 TABLET, FILM COATED ORAL EVERY 12 HOURS
Qty: 6 TABLET | Refills: 0 | Status: SHIPPED | OUTPATIENT
Start: 2022-02-11 | End: 2022-03-15 | Stop reason: ALTCHOICE

## 2022-02-14 ENCOUNTER — CLINICAL SUPPORT (OUTPATIENT)
Dept: REHABILITATION | Facility: HOSPITAL | Age: 77
End: 2022-02-14
Payer: MEDICARE

## 2022-02-14 DIAGNOSIS — M53.82 DECREASED RANGE OF MOTION OF INTERVERTEBRAL DISCS OF CERVICAL SPINE: ICD-10-CM

## 2022-02-14 DIAGNOSIS — R29.898 DECREASED STRENGTH OF UPPER EXTREMITY: ICD-10-CM

## 2022-02-14 PROCEDURE — 97110 THERAPEUTIC EXERCISES: CPT | Mod: PN,CQ

## 2022-02-14 PROCEDURE — 97112 NEUROMUSCULAR REEDUCATION: CPT | Mod: PN,CQ

## 2022-02-14 NOTE — PROGRESS NOTES
[facility]  Outpatient Physical Therapy Daily Treatment Note      Name: Erica Masterson  Clinic Number: [id number]  Visit Date: 2/14/2022    Therapy Diagnosis:   Encounter Diagnoses   Name Primary?    Decreased range of motion of intervertebral discs of cervical spine     Decreased strength of upper extremity        Physician: Adriel Iverson,*   Physician Orders: PT Eval and Treat   Medical Diagnosis from Referral: M25.512 (ICD-10-CM) - Left shoulder pain, unspecified chronicity  Evaluation Date: 1/24/2022  Authorization Period Expiration: 2/7/2022  Plan of Care Expiration: 4/25/2022  Progress Note Due: 2/24/2022  Visit # / Visits authorized: 3 / 5  FOTO: 0/3     Precautions: Standard, Diabetes, cancer and thyroidectomy      Time In: 1016  Time Out: 1100   Total Appointment Time (timed & untimed codes): 44 minutes      Subjective   She feels good coming in today with no tightness and can turn her   Response to previous treatment: Positive    Functional change: None stated      Pain: 5/10  Location: left neck  and shoulder      Objective     Erica received therapeutic exercises to develop strength, endurance, ROM, flexibility, posture and core stabilization for 15  minutes including:    Cervical SNAGS each way 10 x 5 sec   Open books for thoracic and cervical rotation 10c each way   Upper trap shrugging with eccentric control LUE only 20x     NP:   Thoracic extensions, x 15  Cervical AROM rotation x 10   Cervical AROM flexion/ ext x 10    Thread the needle, x 15 B    Erica received the following manual therapy techniques: Soft tissue Mobilization were applied to the: cervical region for 0 minutes    Supine first rib inferior glide bilaterally  STM to L levator scap   Taping for UT facilitation an LS inhibition      Erica participated in neuromuscular re-education activities to improve: Coordination, Kinesthetic, Sense, Proprioception and Posture for 29  "minutes. The following activities were included:    SL scapular clocks 10x  Supine diaphragmatic breathing 15x  Seated diaphragmatic breathing 15x   Serratus wall slides with lift 2 x 10   No money YTB 2 x 10   Side lying Scapular anterior depression to posterior elevation, 10 reps no resistance, 8 with resistance    Side lying Scapular anterio elevation to posterior depression, 10 reps no resistance, 8 with resistance    Scapular retractions, 2 x 10 3" holds     Patient Education and HEP     She was compliant with home exercise program.    Education provided:   - HEP     Written Home Exercises Provided: Patient instructed to cont prior HEP.  Exercises were reviewed and Erica was able to demonstrate them prior to the end of the session.  Erica demonstrated good  understanding of the education provided.     See EMR under Patient Instructions for exercises provided prior visit.    Assessment      Session focused on trapezius strengthening, posture, and diaphragmatic breathing to limit the use of scalenes during breathing. Scapular PNF performed today and pt demo improved mm strength but continues to require VC for direction with PNF's.  Pt with good tolerance to therapy session with no adverse effects reported.      Erica Is progressing well towards her goals.   Pt prognosis is Good.     Pt's spiritual, cultural and educational needs considered and pt agreeable to plan of care and goals.    Anticipated barriers to physical therapy: history of Thyroid surgery    Goals:    STG  Weeks/Visits Date Established  Goal Status    1. Pt will increase cervical extension AROM to >/= 45 deg to improve her ability to look up  5 weeks/ 10 visits  1/24/2022    In progress  2/14/2022      2. Pt will increase cervical AROM rotation by 10 deg to improve ability to look over shoulder while driving  5 weeks/ 10 visits  1/24/2022    In progress  2/14/2022      3.Pt will report improvement in quality of sleep with decreased reports of night " pain since IE to improve quality of life 5 weeks/ 10 visits  1/24/2022    In progress  2/14/2022      4. FOTO goal 5 weeks/ 10 visits  1/24/2022    In progress  2/14/2022     5.Pt will demonstrate and verbalize compliance and independence with HEP to improve therapy outcomes  5 weeks/ 10 visits  1/24/2022    In progress  2/14/2022      6. Pt will report </= 5/10 current pain and </= 7/10 pain for at worst pain over the past week to improve activity tolerance  5 weeks/ 10 visits  1/24/2022    In progress  2/14/2022         LTG Weeks/Visits Date Established  Goal Status    1.Pt will increase LUE strength to >/= 4/5 to improve ability to lift objects  10 weeks/ 16 visits  1/24/2022    In progress  2/14/2022      2. Pt will report decreased tightness of neck in the morning to improve functional mobility  10 weeks/ 16 visits  1/24/2022       In progress  2/14/2022      3.FOTO goal 10 weeks/ 16 visits  1/24/2022    In progress  2/14/2022      4. Pt will demonstrate improved seated posture to improve  10 weeks/ 16 visits  1/24/2022     In progress  2/14/2022     5.Pt will report </= 2/10 current pain and </= 4/10 pain for at worst pain over the past week to improve activity tolerance  10 weeks/ 16 visits  1/24/2022 In progress  2/14/2022           Plan   Plan of care Certification: 1/24/2022 to 4/25/2022.     Continue to focus on scapular positioning, posture, and shelbie scap strength.    Jimmy Esqueda, PTA

## 2022-02-15 ENCOUNTER — OFFICE VISIT (OUTPATIENT)
Dept: OBSTETRICS AND GYNECOLOGY | Facility: CLINIC | Age: 77
End: 2022-02-15
Payer: MEDICARE

## 2022-02-15 ENCOUNTER — TELEPHONE (OUTPATIENT)
Dept: OBSTETRICS AND GYNECOLOGY | Facility: CLINIC | Age: 77
End: 2022-02-15

## 2022-02-15 VITALS
HEIGHT: 65 IN | WEIGHT: 147.81 LBS | HEART RATE: 97 BPM | DIASTOLIC BLOOD PRESSURE: 65 MMHG | BODY MASS INDEX: 24.63 KG/M2 | SYSTOLIC BLOOD PRESSURE: 149 MMHG

## 2022-02-15 DIAGNOSIS — N95.2 ATROPHIC VAGINITIS: ICD-10-CM

## 2022-02-15 DIAGNOSIS — R10.2 VAGINAL PAIN: Primary | ICD-10-CM

## 2022-02-15 DIAGNOSIS — L72.3 SEBACEOUS CYST: ICD-10-CM

## 2022-02-15 PROCEDURE — 1159F MED LIST DOCD IN RCRD: CPT | Mod: CPTII,S$GLB,, | Performed by: OBSTETRICS & GYNECOLOGY

## 2022-02-15 PROCEDURE — 3077F SYST BP >= 140 MM HG: CPT | Mod: CPTII,S$GLB,, | Performed by: OBSTETRICS & GYNECOLOGY

## 2022-02-15 PROCEDURE — 3078F DIAST BP <80 MM HG: CPT | Mod: CPTII,S$GLB,, | Performed by: OBSTETRICS & GYNECOLOGY

## 2022-02-15 PROCEDURE — 99204 PR OFFICE/OUTPT VISIT, NEW, LEVL IV, 45-59 MIN: ICD-10-PCS | Mod: S$GLB,,, | Performed by: OBSTETRICS & GYNECOLOGY

## 2022-02-15 PROCEDURE — 99204 OFFICE O/P NEW MOD 45 MIN: CPT | Mod: S$GLB,,, | Performed by: OBSTETRICS & GYNECOLOGY

## 2022-02-15 PROCEDURE — 3078F PR MOST RECENT DIASTOLIC BLOOD PRESSURE < 80 MM HG: ICD-10-PCS | Mod: CPTII,S$GLB,, | Performed by: OBSTETRICS & GYNECOLOGY

## 2022-02-15 PROCEDURE — 3077F PR MOST RECENT SYSTOLIC BLOOD PRESSURE >= 140 MM HG: ICD-10-PCS | Mod: CPTII,S$GLB,, | Performed by: OBSTETRICS & GYNECOLOGY

## 2022-02-15 PROCEDURE — 1160F PR REVIEW ALL MEDS BY PRESCRIBER/CLIN PHARMACIST DOCUMENTED: ICD-10-PCS | Mod: CPTII,S$GLB,, | Performed by: OBSTETRICS & GYNECOLOGY

## 2022-02-15 PROCEDURE — 99999 PR PBB SHADOW E&M-EST. PATIENT-LVL V: CPT | Mod: PBBFAC,,, | Performed by: OBSTETRICS & GYNECOLOGY

## 2022-02-15 PROCEDURE — 1160F RVW MEDS BY RX/DR IN RCRD: CPT | Mod: CPTII,S$GLB,, | Performed by: OBSTETRICS & GYNECOLOGY

## 2022-02-15 PROCEDURE — 99999 PR PBB SHADOW E&M-EST. PATIENT-LVL V: ICD-10-PCS | Mod: PBBFAC,,, | Performed by: OBSTETRICS & GYNECOLOGY

## 2022-02-15 PROCEDURE — 1159F PR MEDICATION LIST DOCUMENTED IN MEDICAL RECORD: ICD-10-PCS | Mod: CPTII,S$GLB,, | Performed by: OBSTETRICS & GYNECOLOGY

## 2022-02-15 NOTE — TELEPHONE ENCOUNTER
----- Message from Latisha Bennett sent at 2/15/2022  2:58 PM CST -----  Regarding: rx  Contact: pharmacy  Type:  Pharmacy Calling to Clarify an RX    Name of Caller:  Jag  Pharmacy Name:  [facility]  Prescription Name:  Premarin vaginal cream  What do they need to clarify?:  instructions  Best Call Back Number:  [phone]  Additional Information:

## 2022-02-15 NOTE — TELEPHONE ENCOUNTER
Spoke with patient, advised Dr Beach is aware and needs to do some research and we will call her back tomorrow.  Voiced understanding

## 2022-02-15 NOTE — PATIENT INSTRUCTIONS
"atrop  Patient Education       Atrophic Vaginitis   The Basics   Written by the doctors and editors at [facility]   What is atrophic vaginitis? -- Atrophic vaginitis is a condition that causes the vagina and tissues near the vagina to get dry, thin, and inflamed. This can be uncomfortable or make sex painful. Atrophic vaginitis is sometimes called "vaginal atrophy."  Atrophic vaginitis happens when your body does not make enough of a hormone called estrogen. This often happens after you have gone through menopause (meaning you have stopped having a monthly period). It can also happen if your ovaries were removed, if you take certain medicines, or if you are breastfeeding.  What are the symptoms of atrophic vaginitis? -- The symptoms include:  · Vaginal dryness  · Vaginal burning or irritation  · Making less lubrication during sex  · Pain during sex  · Bleeding when something touches or rubs the vagina, for example, after sex. (If you have this symptom, be sure to see a doctor.)  · Vaginal discharge (leaking fluid from the vagina)  · Urinary problems, such as having to urinate often, having pain with urination, or leaking urine. (If you have these symptoms, be sure to see a doctor.)  Is there a test for atrophic vaginitis? -- No. There is no test. But your doctor or nurse should be able to tell if you have it by learning about your symptoms and doing an exam.  Is there anything I can do on my own to feel better? -- Yes. Some people feel better if they use lubricants before sex and use a vaginal moisturizer (sample brand names: Replens, K-Y SILK-E), several times a week. Vaginal moisturizers are not the same as lubricants. They help keep the vagina moist all the time, not just during sex.  Should I see a doctor or nurse? -- See your doctor or nurse if you have symptoms of atrophic vaginitis and they bother you.  Some people never tell their doctor they are having symptoms. Often they are embarrassed or think the " "symptoms are a normal part of aging. But this is a common medical issue and there are treatments that can help.  How is atrophic vaginitis treated? -- The most effective treatment for atrophic vaginitis is the hormone estrogen. When using estrogen to treat atrophic vaginitis, doctors recommend "vaginal estrogen." Vaginal estrogen is any form of estrogen that goes directly into the vagina. It comes in creams, tablets, or a flexible ring. Vaginal estrogen comes in small doses that don't increase the levels of estrogen in other parts of the body very much.   Estrogen also comes in higher doses in a pill, a skin patch, or a different vaginal ring. These are sometimes called "hormone replacement therapy." Vaginal estrogen is better for treating symptoms of atrophic vaginitis. But if you have other menopause symptoms, such as hot flashes or night sweats, your doctor can talk to you about whether higher-dose estrogen is an option. There are different risks and benefits, and some people cannot safely take high doses of hormones.  Besides estrogen, medicines that can treat atrophic vaginitis include:  · Ospemifene (brand name: Osphena) is similar to estrogen, but is not estrogen. It comes as a pill you take once a day. It can cause hot flashes.  · Prasterone, also called dehydroepiandrosterone (DHEA), is a medicine that you put into your vagina once a day. It comes as a "suppository," which is similar to a tablet or pill but goes directly into the vagina.  Other treatments are also available, but are not used as often.  All topics are updated as new evidence becomes available and our peer review process is complete.  This topic retrieved from [employer] on: Sep 21, 2021.  Topic 61744 Version 11.0  Release: 29.4.2 - C29.263  © 2021 UpToDate, Inc. and/or its affiliates. All rights reserved.  Consumer Information Use and Disclaimer   This information is not specific medical advice and does not replace information you receive from " your health care provider. This is only a brief summary of general information. It does NOT include all information about conditions, illnesses, injuries, tests, procedures, treatments, therapies, discharge instructions or life-style choices that may apply to you. You must talk with your health care provider for complete information about your health and treatment options. This information should not be used to decide whether or not to accept your health care provider's advice, instructions or recommendations. Only your health care provider has the knowledge and training to provide advice that is right for you. The use of this information is governed by the [employer] End User License Agreement, available at https://www.[employer]/en/solutions/Sky[employer]/about/cori.The use of [employer] content is governed by the [employer] Terms of Use. ©2021 [employer] All rights reserved.  Copyright   © 2021 UpToDate, Inc. and/or its affiliates. All rights reserved.

## 2022-02-15 NOTE — TELEPHONE ENCOUNTER
----- Message from Beatrice Lakhani sent at 2/15/2022  3:51 PM CST -----  Contact: Pt  Type: Needs Medical Advice    Who Called:  Pt    Best Call Back Number: [phone] or [phone]    Additional Information: Pt had appt today & the Rx that was called in is $90 and doesn't have a generic.  She wants to know if something that has a generic can be called in.  Please call back.  Thanks.

## 2022-02-15 NOTE — PROGRESS NOTES
Subjective:   Chief Complaint:  Vaginal Pain       Patient ID: Erica Masterson is a  76 y.o. female.    She presents today due to the following:    She was recently seen by her primary care physician due to an approximately 3 week history of vaginal pain.  She denies any vaginal bleeding.  She does have a history of hysterectomy for benign indications.      Additional issues include a history of thyroid cancer status post Thyroidectomy in 2 parts.      She was found to have hematuria and started on a antibiotic for possible underlying urinary tract infection (subsequent urine culture was negative).    She reports the pain is worse with movement particularly during her physical therapy sessions.  She is receiving physical therapy for left shoulder issues.      GYN & OB History  No LMP recorded. Patient has had a hysterectomy.   Date of Last Pap: No result found    OB History    Para Term  AB Living   2 2 2 0 0 2   SAB IAB Ectopic Multiple Live Births   0 0 0 0 0      # Outcome Date GA Lbr William/2nd Weight Sex Delivery Anes PTL Lv   2 Term            1 Term               Obstetric Comments   Vag Del x 2       Past Medical History:   Diagnosis Date    Allergy     Anxiety     Cataract     Colon polyp     Degenerative arthritis of knee 4/3/2012    Diverticulosis     GERD (gastroesophageal reflux disease)     History of thyroid cancer     Hyperlipidemia     Hypertension     Multinodular goiter 3/26/2012    Myopathy, unspecified 2010    Tuberculosis         Past Surgical History:   Procedure Laterality Date    BREAST BIOPSY Left     benign    COLONOSCOPY  12/2/15    Dr. Britton, multiple polyps, recheck five years-three years if more than 2 polyps are adenomatous    COLONOSCOPY N/A 2015    COLONOSCOPY N/A 3/20/2019    Procedure: COLONOSCOPY;  Surgeon: Jose Britton MD;  [facility]: [facility];  Service: Endoscopy;  Laterality: N/A;    COLONOSCOPY N/A 2020      Tobi; internal hemorrhoids; diverticulosis; polyps removed; repeat in 3 years    CYSTOSCOPY N/A 8/26/2020    Procedure: CYSTOSCOPY;  Surgeon: Charlotte Walters Jr., MD;  Location: [facility];  Service: Urology;  Laterality: N/A;    DISSECTION OF NECK Left 9/13/2021    Procedure: DISSECTION, NECK;  Surgeon: Ryan Torres MD;  Location: [address];  Service: ENT;  Laterality: Left;    ESOPHAGOGASTRODUODENOSCOPY N/A 5/20/2020    Dr. Britton; small hiatal hernia; gastritis; 8 gastric polyps removed    FOOT SURGERY      HYSTERECTOMY      TVH/BSO > 20 years    INTRALUMINAL GASTROINTESTINAL TRACT IMAGING VIA CAPSULE N/A 6/4/2020    Procedure: IMAGING PROCEDURE, GI TRACT, INTRALUMINAL, VIA CAPSULE;  Surgeon: Jose Britton MD;  Location: [facility];  Service: Endoscopy;  Laterality: N/A;    KNEE SURGERY  06/06/2013    right knee tear Dr Iverson     LAPAROSCOPIC CHOLECYSTECTOMY N/A 9/17/2020    Procedure: CHOLECYSTECTOMY, LAPAROSCOPIC;  Surgeon: Forrest Veronica MD;  Location: [facility];  Service: General;  Laterality: N/A;    LUNG LOBECTOMY  1966    right middle lobectomy, due to Tb    OOPHORECTOMY      SHOULDER SURGERY      francis     THYROIDECTOMY Bilateral 5/19/2021    Procedure: THYROIDECTOMY;  Surgeon: Jamia Amezquita MD;  Location: [address];  Service: General;  Laterality: Bilateral;    THYROIDECTOMY Bilateral 9/13/2021    Procedure: THYROIDECTOMY WITH INTRA-OP PTH;  Surgeon: Ryan Torres MD;  Location: [address];  Service: ENT;  Laterality: Bilateral;  NIM tube  Intraop PTH        Review of Systems  Review of Systems   Constitutional: Negative for fever and unexpected weight change.   HENT: Negative.    Respiratory: Negative for cough and shortness of breath.    Cardiovascular: Negative for chest pain.   Gastrointestinal: Negative for abdominal pain, nausea and vomiting.   Genitourinary: Negative for dysuria and urgency.   Musculoskeletal: Positive for arthralgias (Left  shoulder). Negative for myalgias.   Integumentary:  Negative for rash.   Neurological: Negative.  Negative for headaches.   Breast: negative.            Objective:      Vitals:    02/15/22 1358   BP: (!) 149/65   Pulse: 97     Physical Exam:   Constitutional: She appears well-developed and well-nourished.    HENT:   Head: Normocephalic.      Cardiovascular: Normal rate, regular rhythm and normal heart sounds.     Pulmonary/Chest: Effort normal and breath sounds normal.        Abdominal: Soft. There is no abdominal tenderness.     Genitourinary:    Inguinal canal, vagina, right adnexa and left adnexa normal.      Pelvic exam was performed with patient supine.   External genitalia lesions present. There is lesion on the right labia. There is lesion on the left labia. Right adnexum displays no tenderness and no fullness. Left adnexum displays no tenderness and no fullness. No tenderness or bleeding in the vagina. Cervix is absent.Uterus is absent. Normal urethral meatus.Urethra findings: no tendernessBladder findings: no bladder tenderness   Genitourinary Comments:   Multiple sebaceous cysts noted on bilateral labia.  All less than 1 x 1 cm. Nontender.  No HPV related lesions noted             Musculoskeletal:      Right lower leg: No edema.      Left lower leg: No edema.      Lymphadenopathy:     She has no cervical adenopathy. No inguinal adenopathy noted on the right or left side.    Neurological: She is alert.    Skin: Skin is warm and dry.    Psychiatric: Mood normal.          Assessment:        1. Vaginal pain    2. Atrophic vaginitis    3. Sebaceous cyst             Plan:      Vaginal pain  -     Ambulatory referral/consult to Obstetrics / Gynecology  -     conjugated estrogens (PREMARIN) vaginal cream; Place 0.5 g vaginally once daily AND 0.5 g twice a week.  Dispense: 30 g; Refill: 7    Atrophic vaginitis  -     conjugated estrogens (PREMARIN) vaginal cream; Place 0.5 g vaginally once daily AND 0.5 g twice a  week.  Dispense: 30 g; Refill: 7    Sebaceous cyst  Comments:  Bilateral labia     The above was reviewed and discussed with the patient.      We discussed the etiology of atrophic vaginitis and potential issues including vaginal discomfort and potential findings of hematuria on urinalysis if inappropriate clean-catch was not performed.  Conservative versus medical management discussed and at this time we will proceed with vaginal estrogen    Proper use, potential side effects and issues associated with medications prescribed were reviewed and discussed with the patient.       We will base further evaluation on her response to treatment over time.  She will follow-up as needed or in 3 months for re-evaluation.    Her questions were answered, and she is in agreement with the plan.

## 2022-02-16 ENCOUNTER — TELEPHONE (OUTPATIENT)
Dept: ORTHOPEDICS | Facility: CLINIC | Age: 77
End: 2022-02-16
Payer: MEDICARE

## 2022-02-16 NOTE — TELEPHONE ENCOUNTER
Called and spoke to patient.  She reports that she is having some continued pain.  I informed her that she should continue her PT and follow up with us as scheduled on 3/3/22.     Asa

## 2022-02-16 NOTE — TELEPHONE ENCOUNTER
----- Message from Marleen Zimmerman MA sent at 2/16/2022  3:00 PM CST -----  Contact: pt  Pt states increased pain, cant put pressure on shoulder   Call back [phone]

## 2022-02-18 ENCOUNTER — CLINICAL SUPPORT (OUTPATIENT)
Dept: REHABILITATION | Facility: HOSPITAL | Age: 77
End: 2022-02-18
Payer: MEDICARE

## 2022-02-18 ENCOUNTER — OFFICE VISIT (OUTPATIENT)
Dept: GASTROENTEROLOGY | Facility: CLINIC | Age: 77
End: 2022-02-18
Payer: MEDICARE

## 2022-02-18 ENCOUNTER — DOCUMENTATION ONLY (OUTPATIENT)
Dept: REHABILITATION | Facility: HOSPITAL | Age: 77
End: 2022-02-18

## 2022-02-18 VITALS — WEIGHT: 147.94 LBS | HEIGHT: 65 IN | BODY MASS INDEX: 24.65 KG/M2

## 2022-02-18 DIAGNOSIS — R29.898 DECREASED STRENGTH OF UPPER EXTREMITY: ICD-10-CM

## 2022-02-18 DIAGNOSIS — M53.82 DECREASED RANGE OF MOTION OF INTERVERTEBRAL DISCS OF CERVICAL SPINE: ICD-10-CM

## 2022-02-18 DIAGNOSIS — K92.1 BLOOD IN STOOL: ICD-10-CM

## 2022-02-18 DIAGNOSIS — Z86.010 HISTORY OF COLON POLYPS: Primary | ICD-10-CM

## 2022-02-18 DIAGNOSIS — K62.3 RECTAL PROLAPSE: ICD-10-CM

## 2022-02-18 PROCEDURE — 97112 NEUROMUSCULAR REEDUCATION: CPT | Mod: PN

## 2022-02-18 PROCEDURE — 99214 PR OFFICE/OUTPT VISIT, EST, LEVL IV, 30-39 MIN: ICD-10-PCS | Mod: S$GLB,,, | Performed by: INTERNAL MEDICINE

## 2022-02-18 PROCEDURE — 3288F PR FALLS RISK ASSESSMENT DOCUMENTED: ICD-10-PCS | Mod: CPTII,S$GLB,, | Performed by: INTERNAL MEDICINE

## 2022-02-18 PROCEDURE — 1101F PR PT FALLS ASSESS DOC 0-1 FALLS W/OUT INJ PAST YR: ICD-10-PCS | Mod: CPTII,S$GLB,, | Performed by: INTERNAL MEDICINE

## 2022-02-18 PROCEDURE — 97110 THERAPEUTIC EXERCISES: CPT | Mod: PN

## 2022-02-18 PROCEDURE — 3288F FALL RISK ASSESSMENT DOCD: CPT | Mod: CPTII,S$GLB,, | Performed by: INTERNAL MEDICINE

## 2022-02-18 PROCEDURE — 1126F PR PAIN SEVERITY QUANTIFIED, NO PAIN PRESENT: ICD-10-PCS | Mod: CPTII,S$GLB,, | Performed by: INTERNAL MEDICINE

## 2022-02-18 PROCEDURE — 99999 PR PBB SHADOW E&M-EST. PATIENT-LVL III: CPT | Mod: PBBFAC,,, | Performed by: INTERNAL MEDICINE

## 2022-02-18 PROCEDURE — 99999 PR PBB SHADOW E&M-EST. PATIENT-LVL III: ICD-10-PCS | Mod: PBBFAC,,, | Performed by: INTERNAL MEDICINE

## 2022-02-18 PROCEDURE — 1126F AMNT PAIN NOTED NONE PRSNT: CPT | Mod: CPTII,S$GLB,, | Performed by: INTERNAL MEDICINE

## 2022-02-18 PROCEDURE — 1159F PR MEDICATION LIST DOCUMENTED IN MEDICAL RECORD: ICD-10-PCS | Mod: CPTII,S$GLB,, | Performed by: INTERNAL MEDICINE

## 2022-02-18 PROCEDURE — 97140 MANUAL THERAPY 1/> REGIONS: CPT | Mod: PN

## 2022-02-18 PROCEDURE — 1101F PT FALLS ASSESS-DOCD LE1/YR: CPT | Mod: CPTII,S$GLB,, | Performed by: INTERNAL MEDICINE

## 2022-02-18 PROCEDURE — 1159F MED LIST DOCD IN RCRD: CPT | Mod: CPTII,S$GLB,, | Performed by: INTERNAL MEDICINE

## 2022-02-18 PROCEDURE — 99214 OFFICE O/P EST MOD 30 MIN: CPT | Mod: S$GLB,,, | Performed by: INTERNAL MEDICINE

## 2022-02-18 RX ORDER — SIMETHICONE 125 MG
125 TABLET,CHEWABLE ORAL EVERY 6 HOURS PRN
COMMUNITY
End: 2022-08-18

## 2022-02-18 NOTE — PROGRESS NOTES
Pt visited clinic asking for PT at 1600 today. Pt was treated earlier this morning with positive response to therapy. PT applied rocket tape at end of tx session. Pt arrived this afternoon concerned in which she was taking a nap and reported her throat had tightened up on her. She arrived wondering if the tape would have contributed to this. PT explained to pt that she has reported tightness of anterior throat and chest region in the past upon multiple occassions without tape donned; therefore, PT informed pt that there is likely not a correlation. Also, PT re-educated pt that the purpose of the tape is to facilitate/ inhibit muscle activation and act as a tactile cue to improve her posture. PT educated pt remove tape if this happens again. Pt verbalized understanding.

## 2022-02-18 NOTE — PROGRESS NOTES
[facility]  Outpatient Physical Therapy Daily Treatment Note      Name: Erica Masterson  Clinic Number: [id number]  Visit Date: 2/18/2022    Therapy Diagnosis:   Encounter Diagnoses   Name Primary?    Decreased range of motion of intervertebral discs of cervical spine     Decreased strength of upper extremity        Physician: Adriel Iverson,*   Physician Orders: PT Eval and Treat   Medical Diagnosis from Referral: M25.512 (ICD-10-CM) - Left shoulder pain, unspecified chronicity  Evaluation Date: 1/24/2022  Authorization Period Expiration: 2/27/2022  Plan of Care Expiration: 4/25/2022  Progress Note Due: 2/24/2022  Visit # / Visits authorized: 5 / 17  FOTO: 0/3     Precautions: Standard, Diabetes, cancer and thyroidectomy      Time In: 1055 AM  Time Out: 1150  Total Appointment Time (timed & untimed codes): 50 minutes      Subjective     Arrived with 4/10 pain. Reports she still has trouble sleeping. She has been sleeping on the couch propped up and cant sleep in the bed. Reports she feels good for a couple hours following session but the pain returns. She denies much improvement since starting therapy. Continues to report tightness of her chest region.     Response to previous treatment: Positive    Functional change: None stated      Pain: 5/10  Location: left neck  and shoulder      Objective     Erica received therapeutic exercises to develop strength, endurance, ROM, flexibility, posture and core stabilization for 20 minutes including:    UBE for upper trap 3.5 min FWD , 2.5 min BWD - for UE endurance and upper trap activation   Standing pect corner stretch 3 x 30 sec   Standing shoulder ER and flexion YTB 3 x 10   Standing shoulder extension RTB 3 x 10   Seated upper trap shrugs 20x LUE only     NP:   Cervical SNAGS each way 10 x 5 sec   Open books for thoracic and cervical rotation 10c each way   Upper trap shrugging with eccentric control LUE only  "20x   Thoracic extensions, x 15  Cervical AROM rotation x 10   Cervical AROM flexion/ ext x 10    Thread the needle, x 15 B    Erica received the following manual therapy techniques: Soft tissue Mobilization were applied to the: cervical region for 15 minutes    Supine first rib inferior glide bilaterally  PA glides along cervical spine grade 2   Rocket tape with 3 I strips applied for scapular retraction/posture, upper trap facilitation, levator inhibition     Erica participated in neuromuscular re-education activities to improve: Coordination, Kinesthetic, Sense, Proprioception and Posture for 15 minutes. The following activities were included:    Supine serratus punch 3lb DB 2 x 10   Supine flexion perturbation 2 x 30 sec   Prone row with scap setting 2 x 10   Prone Y with scap setting 2 x 10     NP:   SL scapular clocks 10x  Supine diaphragmatic breathing 15x  Seated diaphragmatic breathing 15x   Serratus wall slides with lift 2 x 10   No money YTB 2 x 10   Side lying Scapular anterior depression to posterior elevation, 10 reps no resistance, 8 with resistance    Side lying Scapular anterio elevation to posterior depression, 10 reps no resistance, 8 with resistance    Scapular retractions, 2 x 10 3" holds     Patient Education and HEP     She was compliant with home exercise program.    Education provided:   - HEP     Written Home Exercises Provided: Patient instructed to cont prior HEP.  Exercises were reviewed and Erica was able to demonstrate them prior to the end of the session.  Erica demonstrated good  understanding of the education provided.     See EMR under Patient Instructions for exercises provided prior visit.    Assessment     Tx session included focus on and addition of scapular strengthening with theraband. She required verbal and tactile cues throughout tx session for scapular setting. She demonstrates weakness in all directions with scapular winging present standing and prone strengthening " exercises. Therefore, also focus on serratus anterior strength to improve this. Also activation of levator noted leading to scapular elevation but this improved with cuing for scapular retraction and depression activation especially in prone. Performed taping again to see if this helps postural awareness and facilitation of upper trap. Pt reporting feeling good post tx session.    Erica Is progressing well towards her goals.   Pt prognosis is Good.     Pt's spiritual, cultural and educational needs considered and pt agreeable to plan of care and goals.    Anticipated barriers to physical therapy: history of Thyroid surgery    Goals:    STG  Weeks/Visits Date Established  Goal Status    1. Pt will increase cervical extension AROM to >/= 45 deg to improve her ability to look up  5 weeks/ 10 visits  1/24/2022    In progress  2/18/2022      2. Pt will increase cervical AROM rotation by 10 deg to improve ability to look over shoulder while driving  5 weeks/ 10 visits  1/24/2022    In progress  2/18/2022      3.Pt will report improvement in quality of sleep with decreased reports of night pain since IE to improve quality of life 5 weeks/ 10 visits  1/24/2022    In progress  2/18/2022      4. FOTO goal 5 weeks/ 10 visits  1/24/2022    In progress  2/18/2022     5.Pt will demonstrate and verbalize compliance and independence with HEP to improve therapy outcomes  5 weeks/ 10 visits  1/24/2022    In progress  2/18/2022      6. Pt will report </= 5/10 current pain and </= 7/10 pain for at worst pain over the past week to improve activity tolerance  5 weeks/ 10 visits  1/24/2022    In progress  2/18/2022         LTG Weeks/Visits Date Established  Goal Status    1.Pt will increase LUE strength to >/= 4/5 to improve ability to lift objects  10 weeks/ 16 visits  1/24/2022    In progress  2/18/2022      2. Pt will report decreased tightness of neck in the morning to improve functional mobility  10 weeks/ 16 visits  1/24/2022        In progress  2/18/2022      3.FOTO goal 10 weeks/ 16 visits  1/24/2022    In progress  2/18/2022      4. Pt will demonstrate improved seated posture to improve  10 weeks/ 16 visits  1/24/2022     In progress  2/18/2022     5.Pt will report </= 2/10 current pain and </= 4/10 pain for at worst pain over the past week to improve activity tolerance  10 weeks/ 16 visits  1/24/2022 In progress  2/18/2022           Plan   Plan of care Certification: 1/24/2022 to 4/25/2022.     Continue to focus on scapular positioning, posture, and shelbie scap strength.    Diamond Hampton, PT

## 2022-02-18 NOTE — PROGRESS NOTES
Subjective:       Patient ID: Erica Masterson is a 76 y.o. female.    This is an established patient.      Chief Complaint: Rectal Bleeding    PAST HISTORY:    Abdominal Pain  This is a new problem. The current episode started 1 to 4 weeks ago. The onset quality is undetermined. The problem occurs daily. Duration: variable. The problem has been waxing and waning. The pain is located in the epigastric region. The pain is at a severity of 6/10. The pain is moderate. The quality of the pain is dull. The abdominal pain does not radiate. Nothing aggravates the pain. The pain is relieved by bowel movements. Treatments tried: laxatives, stool softener.  Had linzess in the past. The treatment provided mild relief. Prior diagnostic workup includes lower endoscopy and upper endoscopy (Last EGD 2016.  Last colonoscopy 2018,).   She has a new anemia on recent lab work with iron studies suggestive of iron deficiency.  She also complains of rectal pain and burning which are new.   She also complains of increased GERD symptoms including belching and heartburn.    Patient had EGD/colonoscopy and pillcam.  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  No further intervention recommended for this unless anemia worsens.  She has been started on Linzess 145 mcg for chronic constipation with some marginal improvement but still has nausea and symptoms of incomplete evacuation.  She denies bleeding.  She is no longer straining but she still has crampy abdominal discomfort.    INTERVAL HISTORY:  Since last visit, patient has done quite well.  She denies any further issues with rectal pain or prolapse.  She denies UGI complaints.  She states that her BMs are better on her current regimen which includes miralax and Linzess PRN.  She states that she had one episode of BRBPR, mild amount, noted on tissue only.  No weight loss or other symptoms.    Review of Systems   HENT: Negative for trouble swallowing.    Respiratory:  "Negative for shortness of breath and wheezing.    Cardiovascular: Negative for palpitations.   Gastrointestinal: Negative for blood in stool.   Skin: Negative for color change.   All other systems reviewed and are negative.      Objective:       Vitals:    02/18/22 0850   Weight: 67.1 kg (147 lb 14.9 oz)   Height: 5' 5" (1.651 m)       Physical Exam  Constitutional:       Appearance: She is well-developed.   HENT:      Head: Normocephalic and atraumatic.   Eyes:      General: No scleral icterus.     Pupils: Pupils are equal, round, and reactive to light.   Cardiovascular:      Rate and Rhythm: Normal rate and regular rhythm.      Heart sounds: No murmur heard.      Pulmonary:      Effort: Pulmonary effort is normal.      Breath sounds: Normal breath sounds. No wheezing.   Abdominal:      General: Bowel sounds are normal. There is no distension.      Palpations: Abdomen is soft.      Tenderness: There is abdominal tenderness (mild, epigastric).   Musculoskeletal:      Cervical back: Normal range of motion.   Lymphadenopathy:      Cervical: No cervical adenopathy.   Neurological:      Mental Status: She is alert and oriented to person, place, and time.         Lab Results   Component Value Date    WBC 6.39 05/12/2020    HGB 11.4 (L) 05/12/2020    HCT 36.3 (L) 05/12/2020    MCV 94 05/12/2020     05/12/2020         CMP  Sodium   Date Value Ref Range Status   12/30/2021 141 136 - 145 mmol/L Final     Potassium   Date Value Ref Range Status   12/30/2021 4.2 3.5 - 5.1 mmol/L Final     Chloride   Date Value Ref Range Status   12/30/2021 103 95 - 110 mmol/L Final     CO2   Date Value Ref Range Status   12/30/2021 27 23 - 29 mmol/L Final     Glucose   Date Value Ref Range Status   12/30/2021 107 70 - 110 mg/dL Final     BUN   Date Value Ref Range Status   12/30/2021 10 8 - 23 mg/dL Final     Creatinine   Date Value Ref Range Status   12/30/2021 1.0 0.5 - 1.4 mg/dL Final   06/04/2013 0.9 0.5 - 1.4 mg/dL Final "     Calcium   Date Value Ref Range Status   12/30/2021 8.7 8.7 - 10.5 mg/dL Final   06/04/2013 9.7 8.7 - 10.5 mg/dL Final     Total Protein   Date Value Ref Range Status   10/04/2021 7.4 6.0 - 8.4 g/dL Final     Albumin   Date Value Ref Range Status   10/04/2021 3.6 3.5 - 5.2 g/dL Final     Total Bilirubin   Date Value Ref Range Status   10/04/2021 0.3 0.1 - 1.0 mg/dL Final     Comment:     For infants and newborns, interpretation of results should be based  on gestational age, weight and in agreement with clinical  observations.    Premature Infant recommended reference ranges:  Up to 24 hours.............<8.0 mg/dL  Up to 48 hours............<12.0 mg/dL  3-5 days..................<15.0 mg/dL  6-29 days.................<15.0 mg/dL       Alkaline Phosphatase   Date Value Ref Range Status   10/04/2021 46 (L) 55 - 135 U/L Final     AST   Date Value Ref Range Status   10/04/2021 17 10 - 40 U/L Final     ALT   Date Value Ref Range Status   10/04/2021 16 10 - 44 U/L Final     Anion Gap   Date Value Ref Range Status   12/30/2021 11 8 - 16 mmol/L Final   06/04/2013 9 5 - 15 meq/L Final     eGFR if    Date Value Ref Range Status   12/30/2021 >60 >60 mL/min/1.73 m^2 Final     eGFR if non    Date Value Ref Range Status   12/30/2021 54 (A) >60 mL/min/1.73 m^2 Final     Comment:     Calculation used to obtain the estimated glomerular filtration  rate (eGFR) is the CKD-EPI equation.          Assessment:       1. History of colon polyps    2. Blood in stool    3. Rectal prolapse        Plan:       1.  Recommend daily exercise, adequate water intake, and high fiber diet.  Recommend daily miralax (17g PO once or twice daily) with intermittently dosed dulcolax (every 2-3 days)  to facilitate bowel movements.  If no relief with this, consider adding emollient laxative (castor oil or mineral oil) +/- enema.  2.  Continue Linzess to 290 mcg as I believe her symptoms are largely being driven by chronic  constipation.  3.  Antireflux precautions including avoidance of late night eating and lying down soon after eating.     4.  Follow up PRN  5.  Colonoscopy due in 2023 for surveillance

## 2022-02-21 ENCOUNTER — CLINICAL SUPPORT (OUTPATIENT)
Dept: REHABILITATION | Facility: HOSPITAL | Age: 77
End: 2022-02-21
Payer: MEDICARE

## 2022-02-21 ENCOUNTER — PES CALL (OUTPATIENT)
Dept: ADMINISTRATIVE | Facility: CLINIC | Age: 77
End: 2022-02-21
Payer: MEDICARE

## 2022-02-21 DIAGNOSIS — M53.82 DECREASED RANGE OF MOTION OF INTERVERTEBRAL DISCS OF CERVICAL SPINE: Primary | ICD-10-CM

## 2022-02-21 DIAGNOSIS — R29.898 DECREASED STRENGTH OF UPPER EXTREMITY: ICD-10-CM

## 2022-02-21 PROCEDURE — 97110 THERAPEUTIC EXERCISES: CPT | Mod: PN,CQ

## 2022-02-21 PROCEDURE — 97140 MANUAL THERAPY 1/> REGIONS: CPT | Mod: PN,CQ

## 2022-02-21 PROCEDURE — 97112 NEUROMUSCULAR REEDUCATION: CPT | Mod: PN,CQ

## 2022-02-21 NOTE — PROGRESS NOTES
Formerly [facility]  Outpatient Physical Therapy Daily Treatment Note      Name: Erica Masterson  Clinic Number: [id number]  Visit Date: 2/21/2022    Therapy Diagnosis:   Encounter Diagnoses   Name Primary?    Decreased range of motion of intervertebral discs of cervical spine Yes    Decreased strength of upper extremity        Physician: Adriel Iverson,*   Physician Orders: PT Eval and Treat   Medical Diagnosis from Referral: M25.512 (ICD-10-CM) - Left shoulder pain, unspecified chronicity  Evaluation Date: 1/24/2022  Authorization Period Expiration: 2/27/2022  Plan of Care Expiration: 4/25/2022  Progress Note Due: 2/24/2022  Visit # / Visits authorized: 6 / 17  FOTO: 0/3     Precautions: Standard, Diabetes, cancer and thyroidectomy      Time In: 1036  Time Out: 1125  Total Appointment Time (timed & untimed codes): 49 minutes      Subjective     States she slept on her L shoulder last night so it is a little tight but has been able to     Response to previous treatment: Positive    Functional change: None stated      Pain: 5/10  Location: left neck  and shoulder      Objective     Erica received therapeutic exercises to develop strength, endurance, ROM, flexibility, posture and core stabilization for 24 minutes including:    UBE for upper trap 3.5 min FWD , 2.5 min BWD - for UE endurance and upper trap activation   Standing pect corner stretch 3 x 30 sec   Standing shoulder ER and flexion YTB 3 x 10   Standing shoulder extension RTB 3 x 10   Seated upper trap shrugs 20x LUE only     NP:   Cervical SNAGS each way 10 x 5 sec   Open books for thoracic and cervical rotation 10c each way   Upper trap shrugging with eccentric control LUE only 20x   Thoracic extensions, x 15  Cervical AROM rotation x 10   Cervical AROM flexion/ ext x 10    Thread the needle, x 15 B    Erica received the following manual therapy techniques: Soft tissue Mobilization were applied to the:  "cervical region for 10 minutes    Seated first rib inferior glide bilaterally  PA glides along cervical spine grade 2 -NP  Rocket tape with 3 I strips applied for scapular retraction/posture, upper trap facilitation, levator inhibition     Erica participated in neuromuscular re-education activities to improve: Coordination, Kinesthetic, Sense, Proprioception and Posture for 15 minutes. The following activities were included:    Supine serratus punch 3lb DB 3 x 10   Supine flexion perturbation 2 x 30 sec   Prone row with scap setting 2 x 10   Prone Y with scap setting 2 x 10     NP:   SL scapular clocks 10x  Supine diaphragmatic breathing 15x  Seated diaphragmatic breathing 15x   Serratus wall slides with lift 2 x 10   No money YTB 2 x 10   Side lying Scapular anterior depression to posterior elevation, 10 reps no resistance, 8 with resistance    Side lying Scapular anterio elevation to posterior depression, 10 reps no resistance, 8 with resistance    Scapular retractions, 2 x 10 3" holds     Patient Education and HEP     She was compliant with home exercise program.    Education provided:   - HEP     Written Home Exercises Provided: Patient instructed to cont prior HEP.  Exercises were reviewed and Erica was able to demonstrate them prior to the end of the session.  Erica demonstrated good  understanding of the education provided.     See EMR under Patient Instructions for exercises provided prior visit.    Assessment   Pt with decreased pain today and able to get better sleep the past few nights. Continued focus on periscapular mm strength for improved scapula control. Taping continued to initiate UT firing and inhibit Levator scapulae. Pt with good tolerance to therapy session with no adverse effects reported.      Erica Is progressing well towards her goals.   Pt prognosis is Good.     Pt's spiritual, cultural and educational needs considered and pt agreeable to plan of care and goals.    Anticipated barriers to " physical therapy: history of Thyroid surgery    Goals:    STG  Weeks/Visits Date Established  Goal Status    1. Pt will increase cervical extension AROM to >/= 45 deg to improve her ability to look up  5 weeks/ 10 visits  1/24/2022    In progress  2/21/2022      2. Pt will increase cervical AROM rotation by 10 deg to improve ability to look over shoulder while driving  5 weeks/ 10 visits  1/24/2022    In progress  2/21/2022      3.Pt will report improvement in quality of sleep with decreased reports of night pain since IE to improve quality of life 5 weeks/ 10 visits  1/24/2022    In progress  2/21/2022      4. FOTO goal 5 weeks/ 10 visits  1/24/2022    In progress  2/21/2022     5.Pt will demonstrate and verbalize compliance and independence with HEP to improve therapy outcomes  5 weeks/ 10 visits  1/24/2022    In progress  2/21/2022      6. Pt will report </= 5/10 current pain and </= 7/10 pain for at worst pain over the past week to improve activity tolerance  5 weeks/ 10 visits  1/24/2022    In progress  2/21/2022         LTG Weeks/Visits Date Established  Goal Status    1.Pt will increase LUE strength to >/= 4/5 to improve ability to lift objects  10 weeks/ 16 visits  1/24/2022    In progress  2/21/2022      2. Pt will report decreased tightness of neck in the morning to improve functional mobility  10 weeks/ 16 visits  1/24/2022       In progress  2/21/2022      3.FOTO goal 10 weeks/ 16 visits  1/24/2022    In progress  2/21/2022      4. Pt will demonstrate improved seated posture to improve  10 weeks/ 16 visits  1/24/2022     In progress  2/21/2022     5.Pt will report </= 2/10 current pain and </= 4/10 pain for at worst pain over the past week to improve activity tolerance  10 weeks/ 16 visits  1/24/2022 In progress  2/21/2022           Plan   Plan of care Certification: 1/24/2022 to 4/25/2022.     Continue to focus on scapular positioning, posture, and shelbie scap strength.    Jimmy Esqueda, PTA

## 2022-02-25 ENCOUNTER — CLINICAL SUPPORT (OUTPATIENT)
Dept: REHABILITATION | Facility: HOSPITAL | Age: 77
End: 2022-02-25
Attending: ORTHOPAEDIC SURGERY
Payer: MEDICARE

## 2022-02-25 DIAGNOSIS — M53.82 DECREASED RANGE OF MOTION OF INTERVERTEBRAL DISCS OF CERVICAL SPINE: Primary | ICD-10-CM

## 2022-02-25 DIAGNOSIS — R29.898 DECREASED STRENGTH OF UPPER EXTREMITY: ICD-10-CM

## 2022-02-25 PROCEDURE — 97112 NEUROMUSCULAR REEDUCATION: CPT | Mod: PN

## 2022-02-25 PROCEDURE — 97110 THERAPEUTIC EXERCISES: CPT | Mod: PN

## 2022-02-25 NOTE — PROGRESS NOTES
"  [facility]  Outpatient Physical Therapy Daily Treatment Note      Name: Erica Masterson  Clinic Number: [id number]  Visit Date: 2/25/2022    Therapy Diagnosis:   Encounter Diagnoses   Name Primary?    Decreased range of motion of intervertebral discs of cervical spine Yes    Decreased strength of upper extremity        Physician: Adriel Iverson,*   Physician Orders: PT Eval and Treat   Medical Diagnosis from Referral: M25.512 (ICD-10-CM) - Left shoulder pain, unspecified chronicity  Evaluation Date: 1/24/2022  Authorization Period Expiration: 2/27/2022  Plan of Care Expiration: 4/25/2022  Progress Note Due: 2/24/2022  Visit # / Visits authorized: 7 / 17 (7/16)  FOTO: 0/3     Precautions: Standard, Diabetes, cancer and thyroidectomy      Time In: 1057 AM  Time Out: 1145 AM  Total Appointment Time (timed & untimed codes): 45 minutes      Subjective     "I think I am getting there". She thinks the tape is helping some. Denies pain upon arrival. Reports sleeping has been better. Reports she is able to sleep on the R side. She can also lay on the L side which is new. She is also able to get better sleep / longer shifts than she did prior. She reports she continues to get tightness in the throat/L shoulder region. She reports this is daily. Reports an odor that she has been smelling for months but reports she told the MD about this. Reports about 30% improvement since IE and 7/10 for at worst pain over the past week.     Response to previous treatment: Positive    Functional change: None stated      Pain: 0/10  Location: left neck  and shoulder      Objective     Initial evaluation:   Cervical AROM (Degrees) PROM (Degrees) Comments   Flexion 55 "tightness at anterior neck region" Pain in the low back region   Extension 35 NT Pain on L side of neck region and low back   Right Side Bend 30 pull Tightness on L side   Left Side Bend 30 pull Tightness of L and R " side    Right Rotation 60 Limited  Tightness L side    Left Rotation 60 Limited  Tightness L side       Initial evaluation:   RUE Strength Grade LUE Strength Grade   Shoulder Flexion 4/5 Shoulder Flexion 4-/5   Shoulder Extension 4-/5 Shoulder Extension 4-/5   Shoulder Abduction 4/5 Shoulder Abduction 3+/5   Shoulder Adduction NT Shoulder Adduction NT   Shoulder Internal Rotation 4/5 Shoulder Internal Rotation 4/5   Shoulder External Rotation 4/5 Shoulder External Rotation 3+/5   Upper Trap 4+/5 Upper Trap 4/5   Middle Trap 4-/5 Middle Trap 3+/5   Lower Trap 4-/5 Lower Trap 3+/5        2/25/2022  60 flexion   50 extension   40 R SB  30 L SB  65 L rotation   68 R rotation     MMT: LUE 2/25/2022    Flexion: 4+/5  ABD: 4/5  ER: 4+/5  IR: 4+/5  Extension: 4+/5    FOTO 2/25/2022: 61/100 = 39% limitation      Erica received therapeutic exercises to develop strength, endurance, ROM, flexibility, posture and core stabilization for 35 minutes including:    Testing above   UBE 3 min FWD , 3 min BWD - for UE endurance and upper trap activation   Supine pect stretch over half bolster x 3 min   Standing shoulder ER and flexion YTB 2 x 10   Standing shoulder extension YTB 2 x 10   Standing shoulder flexion YTB 2 x 10   Standing shrug 2lb DB 2 x 10   Cervical SNAGS each way 10 x 10 sec     Erica received the following manual therapy techniques: Soft tissue Mobilization were applied to the: cervical region for 0 minutes    Seated first rib inferior glide bilaterally  PA glides along cervical spine grade 2 -NP  Rocket tape with 3 I strips applied for scapular retraction/posture, upper trap facilitation, levator inhibition     Erica participated in neuromuscular re-education activities to improve: Coordination, Kinesthetic, Sense, Proprioception and Posture for 10 minutes. The following activities were included:    Supine serratus punch 3lb DB 3 x 10   SL scapular retraction and depression 10x  SL scapular resisted PNF 10x  "    NP:  Supine flexion perturbation 2 x 30 sec   Prone row with scap setting 2 x 10   Prone Y with scap setting 2 x 10    SL scapular clocks 10x  Supine diaphragmatic breathing 15x  Seated diaphragmatic breathing 15x   Serratus wall slides with lift 2 x 10   No money YTB 2 x 10   Side lying Scapular anterior depression to posterior elevation, 10 reps no resistance, 8 with resistance    Side lying Scapular anterio elevation to posterior depression, 10 reps no resistance, 8 with resistance    Scapular retractions, 2 x 10 3" holds     Patient Education and HEP     She was compliant with home exercise program.    Education provided:   - HEP     Written Home Exercises Provided: Patient instructed to cont prior HEP.  Exercises were reviewed and Erica was able to demonstrate them prior to the end of the session.  Erica demonstrated good  understanding of the education provided.     See EMR under Patient Instructions for exercises provided prior visit.    Assessment     Based on objective measures taken today, pt demonstrates improvement in cervical AROM partially meeting her goal for this. She demonstrates greatest improvements in cervical extension ROM followed by rotation. Per subjective reports, she has been able to sleep for longer stretches prior on waking due to tightness and pain. She is also able to lay on the L shoulder now and reports improvements in her ability to turn her head. Over the past few visits, she has denied pain upon arrival and reports decrease in at worst pain as well. She also demonstrates improvement in BUE strength since IE. Pt has met 4/6 STG and is progressing well towards long term goals.     Tx session focused on periscapular strengthening and cervical ROM. HEP updated to include shoulder thera-band exercises for strengthening and improving scapular positioning as this continues to be a focus of therapy. Rocket tape remained in good positioning since last session. Pt reported feeling good " post tx session.     Erica Is progressing well towards her goals.   Pt prognosis is Good.     Pt's spiritual, cultural and educational needs considered and pt agreeable to plan of care and goals.    Anticipated barriers to physical therapy: history of Thyroid surgery    Goals:    STG  Weeks/Visits Date Established  Goal Status    1. Pt will increase cervical extension AROM to >/= 45 deg to improve her ability to look up  5 weeks/ 10 visits  1/24/2022    MET 2/25/2022   2. Pt will increase cervical AROM rotation by 10 deg to improve ability to look over shoulder while driving  5 weeks/ 10 visits  1/24/2022    Partially MET  2/25/2022      3.Pt will report improvement in quality of sleep with decreased reports of night pain since IE to improve quality of life 5 weeks/ 10 visits  1/24/2022    MET ( per pt she is able to get longer stretches of sleep than prior to IE) 2/25/2022   4. FOTO goal 5 weeks/ 10 visits  1/24/2022    MET 2/25/2022   5.Pt will demonstrate and verbalize compliance and independence with HEP to improve therapy outcomes  5 weeks/ 10 visits  1/24/2022    In progress  2/25/2022      6. Pt will report </= 5/10 current pain and </= 7/10 pain for at worst pain over the past week to improve activity tolerance  5 weeks/ 10 visits  1/24/2022    MET 2/25/2022      LTG Weeks/Visits Date Established  Goal Status    1.Pt will increase LUE strength to >/= 4/5 to improve ability to lift objects  10 weeks/ 16 visits  1/24/2022    MET 2/25/2022   2. Pt will report decreased tightness of neck in the morning to improve functional mobility  10 weeks/ 16 visits  1/24/2022       In progress  2/25/2022      3.FOTO goal 10 weeks/ 16 visits  1/24/2022    In progress  2/25/2022      4. Pt will demonstrate improved seated posture to improve  10 weeks/ 16 visits  1/24/2022     In progress  2/25/2022     5.Pt will report </= 2/10 current pain and </= 4/10 pain for at worst pain over the past week to improve activity tolerance   10 weeks/ 16 visits  1/24/2022 In progress  2/25/2022           Plan   Plan of care Certification: 1/24/2022 to 4/25/2022.     Continue to focus on scapular positioning, posture, and shelbie scap strength.    Diamond Hampton, PT

## 2022-02-28 ENCOUNTER — CLINICAL SUPPORT (OUTPATIENT)
Dept: REHABILITATION | Facility: HOSPITAL | Age: 77
End: 2022-02-28
Payer: MEDICARE

## 2022-02-28 DIAGNOSIS — R29.898 DECREASED STRENGTH OF UPPER EXTREMITY: ICD-10-CM

## 2022-02-28 DIAGNOSIS — M53.82 DECREASED RANGE OF MOTION OF INTERVERTEBRAL DISCS OF CERVICAL SPINE: Primary | ICD-10-CM

## 2022-02-28 PROCEDURE — 97140 MANUAL THERAPY 1/> REGIONS: CPT | Mod: PN,CQ

## 2022-02-28 PROCEDURE — 97110 THERAPEUTIC EXERCISES: CPT | Mod: PN,CQ

## 2022-02-28 NOTE — PROGRESS NOTES
[facility]  Outpatient Physical Therapy Daily Treatment Note      Name: Erica Masterson  Clinic Number: [id number]  Visit Date: 2/28/2022    Therapy Diagnosis:   Encounter Diagnoses   Name Primary?    Decreased range of motion of intervertebral discs of cervical spine Yes    Decreased strength of upper extremity        Physician: Adriel Iverson,*   Physician Orders: PT Eval and Treat   Medical Diagnosis from Referral: M25.512 (ICD-10-CM) - Left shoulder pain, unspecified chronicity  Evaluation Date: 1/24/2022  Authorization Period Expiration: 2/27/2022  Plan of Care Expiration: 4/25/2022  Progress Note Due: 2/24/2022  Visit # / Visits authorized: 10 / 17 (10/16)  FOTO: 0/3     Precautions: Standard, Diabetes, cancer and thyroidectomy      Time In: 1030  Time Out: 1115   Total Appointment Time (timed & untimed codes): 45 minutes      Subjective   States she began having pain and tightness around her neck and into her chest again.   Response to previous treatment: Positive    Functional change: None stated      Pain: 0/10  Location: left neck  and shoulder      Objective   FOTO 2/25/2022: 61/100 = 39% limitation      Erica received therapeutic exercises to develop strength, endurance, ROM, flexibility, posture and core stabilization for 37 minutes including:    Testing above   UBE 3 min FWD , 3 min BWD - for UE endurance and upper trap activation   Supine pect stretch over half bolster x 3 min   Standing shoulder ER and flexion YTB 2 x 10   Standing shoulder extension YTB 2 x 10   Standing shoulder flexion YTB 2 x 10   Standing shrug 2lb DB 2 x 10       Self first rib mob, 3 x 10   Cervical SNAGS each way 10 x 10 sec     Erica received the following manual therapy techniques: Soft tissue Mobilization were applied to the: cervical region for 8 minutes    Seated first rib inferior glide bilaterally  PA glides along cervical spine grade 2 -NP  Rocket tape with  "3 I strips applied for scapular retraction/posture, upper trap facilitation, levator inhibition     Erica participated in neuromuscular re-education activities to improve: Coordination, Kinesthetic, Sense, Proprioception and Posture for 0 minutes. The following activities were included:    Supine serratus punch 3lb DB 3 x 10   SL scapular retraction and depression 10x  SL scapular resisted PNF 10x     NP:  Supine flexion perturbation 2 x 30 sec   Prone row with scap setting 2 x 10   Prone Y with scap setting 2 x 10    SL scapular clocks 10x  Supine diaphragmatic breathing 15x  Seated diaphragmatic breathing 15x   Serratus wall slides with lift 2 x 10   No money YTB 2 x 10   Side lying Scapular anterior depression to posterior elevation, 10 reps no resistance, 8 with resistance    Side lying Scapular anterio elevation to posterior depression, 10 reps no resistance, 8 with resistance    Scapular retractions, 2 x 10 3" holds     Patient Education and HEP     She was compliant with home exercise program.    Education provided:   - HEP     Written Home Exercises Provided: Patient instructed to cont prior HEP.  Exercises were reviewed and Erica was able to demonstrate them prior to the end of the session.  Erica demonstrated good  understanding of the education provided.     See EMR under Patient Instructions for exercises provided prior visit.    Assessment   Pt with increased pain upon arrival with nothing out of ordinary to exacerbate her symptoms. Continued UT training and periscapular strengthening performed today. Pt with good tolerance to therapy session with no adverse effects reported.         Erica Is progressing well towards her goals.   Pt prognosis is Good.     Pt's spiritual, cultural and educational needs considered and pt agreeable to plan of care and goals.    Anticipated barriers to physical therapy: history of Thyroid surgery    Goals:    STG  Weeks/Visits Date Established  Goal Status    1. Pt will " increase cervical extension AROM to >/= 45 deg to improve her ability to look up  5 weeks/ 10 visits  1/24/2022    MET 2/25/2022   2. Pt will increase cervical AROM rotation by 10 deg to improve ability to look over shoulder while driving  5 weeks/ 10 visits  1/24/2022    Partially MET  2/28/2022      3.Pt will report improvement in quality of sleep with decreased reports of night pain since IE to improve quality of life 5 weeks/ 10 visits  1/24/2022    MET ( per pt she is able to get longer stretches of sleep than prior to IE) 2/25/2022   4. FOTO goal 5 weeks/ 10 visits  1/24/2022    MET 2/25/2022   5.Pt will demonstrate and verbalize compliance and independence with HEP to improve therapy outcomes  5 weeks/ 10 visits  1/24/2022    In progress  2/28/2022      6. Pt will report </= 5/10 current pain and </= 7/10 pain for at worst pain over the past week to improve activity tolerance  5 weeks/ 10 visits  1/24/2022    MET 2/25/2022      LTG Weeks/Visits Date Established  Goal Status    1.Pt will increase LUE strength to >/= 4/5 to improve ability to lift objects  10 weeks/ 16 visits  1/24/2022    MET 2/25/2022   2. Pt will report decreased tightness of neck in the morning to improve functional mobility  10 weeks/ 16 visits  1/24/2022       In progress  2/28/2022      3.FOTO goal 10 weeks/ 16 visits  1/24/2022    In progress  2/28/2022      4. Pt will demonstrate improved seated posture to improve  10 weeks/ 16 visits  1/24/2022     In progress  2/28/2022     5.Pt will report </= 2/10 current pain and </= 4/10 pain for at worst pain over the past week to improve activity tolerance  10 weeks/ 16 visits  1/24/2022 In progress  2/28/2022           Plan   Plan of care Certification: 1/24/2022 to 4/25/2022.     Continue to focus on scapular positioning, posture, and shelbie scap strength.    Jimmy Esqueda, PTA

## 2022-03-03 ENCOUNTER — OFFICE VISIT (OUTPATIENT)
Dept: ORTHOPEDICS | Facility: CLINIC | Age: 77
End: 2022-03-03
Payer: MEDICARE

## 2022-03-03 ENCOUNTER — CLINICAL SUPPORT (OUTPATIENT)
Dept: REHABILITATION | Facility: HOSPITAL | Age: 77
End: 2022-03-03
Attending: ORTHOPAEDIC SURGERY
Payer: MEDICARE

## 2022-03-03 VITALS — BODY MASS INDEX: 24.49 KG/M2 | HEIGHT: 65 IN | RESPIRATION RATE: 18 BRPM | WEIGHT: 147 LBS

## 2022-03-03 DIAGNOSIS — R29.898 DECREASED STRENGTH OF UPPER EXTREMITY: ICD-10-CM

## 2022-03-03 DIAGNOSIS — G52.8 SPINAL ACCESSORY NERVE DISORDER: ICD-10-CM

## 2022-03-03 DIAGNOSIS — M53.82 DECREASED RANGE OF MOTION OF INTERVERTEBRAL DISCS OF CERVICAL SPINE: Primary | ICD-10-CM

## 2022-03-03 DIAGNOSIS — M25.512 LEFT SHOULDER PAIN, UNSPECIFIED CHRONICITY: Primary | ICD-10-CM

## 2022-03-03 PROCEDURE — 3288F FALL RISK ASSESSMENT DOCD: CPT | Mod: CPTII,S$GLB,, | Performed by: ORTHOPAEDIC SURGERY

## 2022-03-03 PROCEDURE — 99999 PR PBB SHADOW E&M-EST. PATIENT-LVL III: ICD-10-PCS | Mod: PBBFAC,,, | Performed by: ORTHOPAEDIC SURGERY

## 2022-03-03 PROCEDURE — 1160F PR REVIEW ALL MEDS BY PRESCRIBER/CLIN PHARMACIST DOCUMENTED: ICD-10-PCS | Mod: CPTII,S$GLB,, | Performed by: ORTHOPAEDIC SURGERY

## 2022-03-03 PROCEDURE — 1160F RVW MEDS BY RX/DR IN RCRD: CPT | Mod: CPTII,S$GLB,, | Performed by: ORTHOPAEDIC SURGERY

## 2022-03-03 PROCEDURE — 1159F MED LIST DOCD IN RCRD: CPT | Mod: CPTII,S$GLB,, | Performed by: ORTHOPAEDIC SURGERY

## 2022-03-03 PROCEDURE — 1125F PR PAIN SEVERITY QUANTIFIED, PAIN PRESENT: ICD-10-PCS | Mod: CPTII,S$GLB,, | Performed by: ORTHOPAEDIC SURGERY

## 2022-03-03 PROCEDURE — 3288F PR FALLS RISK ASSESSMENT DOCUMENTED: ICD-10-PCS | Mod: CPTII,S$GLB,, | Performed by: ORTHOPAEDIC SURGERY

## 2022-03-03 PROCEDURE — 99213 PR OFFICE/OUTPT VISIT, EST, LEVL III, 20-29 MIN: ICD-10-PCS | Mod: 25,S$GLB,, | Performed by: ORTHOPAEDIC SURGERY

## 2022-03-03 PROCEDURE — 99999 PR PBB SHADOW E&M-EST. PATIENT-LVL III: CPT | Mod: PBBFAC,,, | Performed by: ORTHOPAEDIC SURGERY

## 2022-03-03 PROCEDURE — 20610 DRAIN/INJ JOINT/BURSA W/O US: CPT | Mod: LT,S$GLB,, | Performed by: ORTHOPAEDIC SURGERY

## 2022-03-03 PROCEDURE — 1125F AMNT PAIN NOTED PAIN PRSNT: CPT | Mod: CPTII,S$GLB,, | Performed by: ORTHOPAEDIC SURGERY

## 2022-03-03 PROCEDURE — 99213 OFFICE O/P EST LOW 20 MIN: CPT | Mod: 25,S$GLB,, | Performed by: ORTHOPAEDIC SURGERY

## 2022-03-03 PROCEDURE — 97110 THERAPEUTIC EXERCISES: CPT | Mod: PN,CQ

## 2022-03-03 PROCEDURE — 1159F PR MEDICATION LIST DOCUMENTED IN MEDICAL RECORD: ICD-10-PCS | Mod: CPTII,S$GLB,, | Performed by: ORTHOPAEDIC SURGERY

## 2022-03-03 PROCEDURE — 1101F PR PT FALLS ASSESS DOC 0-1 FALLS W/OUT INJ PAST YR: ICD-10-PCS | Mod: CPTII,S$GLB,, | Performed by: ORTHOPAEDIC SURGERY

## 2022-03-03 PROCEDURE — 20610 LARGE JOINT ASPIRATION/INJECTION: L SUBACROMIAL BURSA: ICD-10-PCS | Mod: LT,S$GLB,, | Performed by: ORTHOPAEDIC SURGERY

## 2022-03-03 PROCEDURE — 1101F PT FALLS ASSESS-DOCD LE1/YR: CPT | Mod: CPTII,S$GLB,, | Performed by: ORTHOPAEDIC SURGERY

## 2022-03-03 RX ORDER — TRIAMCINOLONE ACETONIDE 40 MG/ML
40 INJECTION, SUSPENSION INTRA-ARTICULAR; INTRAMUSCULAR
Status: DISCONTINUED | OUTPATIENT
Start: 2022-03-03 | End: 2022-03-03 | Stop reason: HOSPADM

## 2022-03-03 RX ADMIN — TRIAMCINOLONE ACETONIDE 40 MG: 40 INJECTION, SUSPENSION INTRA-ARTICULAR; INTRAMUSCULAR at 09:03

## 2022-03-03 NOTE — PROCEDURES
Large Joint Aspiration/Injection: L subacromial bursa    Date/Time: 3/3/2022 9:00 AM  Performed by: Adriel Iverson MD  Authorized by: Adriel Iverson MD     Consent Done?:  Yes (Verbal)  Indications:  Pain  Site marked: the procedure site was marked    Timeout: prior to procedure the correct patient, procedure, and site was verified    Local anesthetic:  Lidocaine 1% without epinephrine and bupivacaine 0.25% without epinephrine  Anesthetic total (ml):  6      Details:  Needle Size:  20 G  Ultrasonic Guidance for needle placement?: No    Approach:  Posterior  Location:  Shoulder  Site:  L subacromial bursa  Medications:  40 mg triamcinolone acetonide 40 mg/mL  Patient tolerance:  Patient tolerated the procedure well with no immediate complications

## 2022-03-03 NOTE — PROGRESS NOTES
[facility]  Outpatient Physical Therapy Daily Treatment Note      Name: Erica Masterson  Clinic Number: [id number]  Visit Date: 3/3/2022    Therapy Diagnosis:   Encounter Diagnoses   Name Primary?    Decreased range of motion of intervertebral discs of cervical spine Yes    Decreased strength of upper extremity        Physician: Adriel Iverson,*   Physician Orders: PT Eval and Treat   Medical Diagnosis from Referral: M25.512 (ICD-10-CM) - Left shoulder pain, unspecified chronicity  Evaluation Date: 1/24/2022  Authorization Period Expiration: 2/27/2022  Plan of Care Expiration: 4/25/2022  Progress Note Due: 2/24/2022  Visit # / Visits authorized: 11 / 17 (11/16)  FOTO: 0/3     Precautions: Standard, Diabetes, cancer and thyroidectomy      Time In: 1018  Time Out: 1105   Total Appointment Time (timed & untimed codes): 47  minutes      Subjective   Feels like the taping does help. She is feeling better than she was last visit. Saw orthopedic and states he ordered 4 more weeks of therapy.  Response to previous treatment: Positive    Functional change: None stated      Pain: 3/10  Location: left neck  and shoulder      Objective   FOTO 2/25/2022: 61/100 = 39% limitation      Erica received therapeutic exercises to develop strength, endurance, ROM, flexibility, posture and core stabilization for 42 minutes including:      UBE 3 min FWD , 3 min BWD - for UE endurance and upper trap activation   Supine pect stretch over half bolster x 3 min   Standing shoulder ER and flexion YTB 2 x 10   Standing shoulder extension RTB 2 x 10   Standing shoulder flexion YTB 2 x 10   Standing shrug 2lb DB 2 x 10       Self first rib mob, 3 x 10   Cervical SNAGS each way 10 x 10 sec     Erica received the following manual therapy techniques: Soft tissue Mobilization were applied to the: cervical region for 5 minutes    Seated first rib inferior glide bilaterally  PA glides along  "cervical spine grade 2 -NP  Rocket tape with 3 I strips applied for scapular retraction/posture, upper trap facilitation, levator inhibition     Erica participated in neuromuscular re-education activities to improve: Coordination, Kinesthetic, Sense, Proprioception and Posture for 0 minutes. The following activities were included:    Supine serratus punch 3lb DB 3 x 10   SL scapular retraction and depression 10x  SL scapular resisted PNF 10x     NP:  Supine flexion perturbation 2 x 30 sec   Prone row with scap setting 2 x 10   Prone Y with scap setting 2 x 10    SL scapular clocks 10x  Supine diaphragmatic breathing 15x  Seated diaphragmatic breathing 15x   Serratus wall slides with lift 2 x 10   No money YTB 2 x 10   Side lying Scapular anterior depression to posterior elevation, 10 reps no resistance, 8 with resistance    Side lying Scapular anterio elevation to posterior depression, 10 reps no resistance, 8 with resistance    Scapular retractions, 2 x 10 3" holds     Patient Education and HEP     She was compliant with home exercise program.    Education provided:   - HEP     Written Home Exercises Provided: Patient instructed to cont prior HEP.  Exercises were reviewed and Erica was able to demonstrate them prior to the end of the session.  Erica demonstrated good  understanding of the education provided.     See EMR under Patient Instructions for exercises provided prior visit.    Assessment   Pt with improved pain levels today and also demo improved UT contraction. Able to progress to RTB with some UE resistance exercises without c/o pain or mm tightening. Pt with good tolerance to therapy session with no adverse effects reported.      Erica Is progressing well towards her goals.   Pt prognosis is Good.     Pt's spiritual, cultural and educational needs considered and pt agreeable to plan of care and goals.    Anticipated barriers to physical therapy: history of Thyroid surgery    Goals:    STG  Weeks/Visits Date " Established  Goal Status    1. Pt will increase cervical extension AROM to >/= 45 deg to improve her ability to look up  5 weeks/ 10 visits  1/24/2022    MET 2/25/2022   2. Pt will increase cervical AROM rotation by 10 deg to improve ability to look over shoulder while driving  5 weeks/ 10 visits  1/24/2022    Partially MET  3/3/2022      3.Pt will report improvement in quality of sleep with decreased reports of night pain since IE to improve quality of life 5 weeks/ 10 visits  1/24/2022    MET ( per pt she is able to get longer stretches of sleep than prior to IE) 2/25/2022   4. FOTO goal 5 weeks/ 10 visits  1/24/2022    MET 2/25/2022   5.Pt will demonstrate and verbalize compliance and independence with HEP to improve therapy outcomes  5 weeks/ 10 visits  1/24/2022    In progress  3/3/2022      6. Pt will report </= 5/10 current pain and </= 7/10 pain for at worst pain over the past week to improve activity tolerance  5 weeks/ 10 visits  1/24/2022    MET 2/25/2022      LTG Weeks/Visits Date Established  Goal Status    1.Pt will increase LUE strength to >/= 4/5 to improve ability to lift objects  10 weeks/ 16 visits  1/24/2022    MET 2/25/2022   2. Pt will report decreased tightness of neck in the morning to improve functional mobility  10 weeks/ 16 visits  1/24/2022       In progress  3/3/2022      3.FOTO goal 10 weeks/ 16 visits  1/24/2022    In progress  3/3/2022      4. Pt will demonstrate improved seated posture to improve  10 weeks/ 16 visits  1/24/2022     In progress  3/3/2022     5.Pt will report </= 2/10 current pain and </= 4/10 pain for at worst pain over the past week to improve activity tolerance  10 weeks/ 16 visits  1/24/2022 In progress  3/3/2022           Plan   Plan of care Certification: 1/24/2022 to 4/25/2022.     Continue to focus on scapular positioning, posture, and shelbie scap strength.    Jimmy Esqueda, PTA

## 2022-03-03 NOTE — PROGRESS NOTES
Past Medical History:   Diagnosis Date    Allergy     Anxiety     Cataract     Colon polyp     Degenerative arthritis of knee 4/3/2012    Diverticulosis     GERD (gastroesophageal reflux disease)     History of thyroid cancer 2021    Hyperlipidemia     Hypertension     Multinodular goiter 3/26/2012    Myopathy, unspecified 1/18/2010    Tuberculosis        Past Surgical History:   Procedure Laterality Date    BREAST BIOPSY Left 2015    benign    COLONOSCOPY  12/2/15    Dr. Britton, multiple polyps, recheck five years-three years if more than 2 polyps are adenomatous    COLONOSCOPY N/A 12/2/2015    COLONOSCOPY N/A 3/20/2019    Procedure: COLONOSCOPY;  Surgeon: Jose Britton MD;  Location: [facility] ENDO;  Service: Endoscopy;  Laterality: N/A;    COLONOSCOPY N/A 5/20/2020    Dr. Britton; internal hemorrhoids; diverticulosis; polyps removed; repeat in 3 years    CYSTOSCOPY N/A 8/26/2020    Procedure: CYSTOSCOPY;  Surgeon: Charlotte Walters Jr., MD;  Location: [facility];  Service: Urology;  Laterality: N/A;    DISSECTION OF NECK Left 9/13/2021    Procedure: DISSECTION, NECK;  Surgeon: Ryan Torres MD;  Location: [facility] OR [address] [place] [address];  Service: ENT;  Laterality: Left;    ESOPHAGOGASTRODUODENOSCOPY N/A 5/20/2020    Dr. Britton; small hiatal hernia; gastritis; 8 gastric polyps removed    FOOT SURGERY      HYSTERECTOMY      TVH/BSO > 20 years    INTRALUMINAL GASTROINTESTINAL TRACT IMAGING VIA CAPSULE N/A 6/4/2020    Procedure: IMAGING PROCEDURE, GI TRACT, INTRALUMINAL, VIA CAPSULE;  Surgeon: Jose Britton MD;  Location: [facility];  Service: Endoscopy;  Laterality: N/A;    KNEE SURGERY  06/06/2013    right knee tear Dr Iverson     LAPAROSCOPIC CHOLECYSTECTOMY N/A 9/17/2020    Procedure: CHOLECYSTECTOMY, LAPAROSCOPIC;  Surgeon: Forrest Veronica MD;  Location: [facility] OR;  Service: General;  Laterality: N/A;    LUNG LOBECTOMY  1966    right middle lobectomy, due to Tb    OOPHORECTOMY      SHOULDER  SURGERY      francis     THYROIDECTOMY Bilateral 5/19/2021    Procedure: THYROIDECTOMY;  Surgeon: Jamia Amezquita MD;  Location: [facility] OR [address] [place] [address];  Service: General;  Laterality: Bilateral;    THYROIDECTOMY Bilateral 9/13/2021    Procedure: THYROIDECTOMY WITH INTRA-OP PTH;  Surgeon: Ryan Torres MD;  [facility];  Service: ENT;  Laterality: Bilateral;  NIM tube  Intraop PTH       Current Outpatient Medications   Medication Sig    amLODIPine (NORVASC) 2.5 MG tablet Take 1 tablet by mouth once daily    blood sugar diagnostic (BLOOD GLUCOSE TEST) Strp True Metrix glucose strips check once daily    carboxymethylcellulose sodium (REFRESH OPHT) Apply to eye.    ciprofloxacin HCl (CIPRO) 250 MG tablet Take 1 tablet (250 mg total) by mouth every 12 (twelve) hours.    conjugated estrogens (PREMARIN) vaginal cream Place 0.5 g vaginally once daily AND 0.5 g twice a week.    doxepin (SINEQUAN) 10 MG capsule Take 10 mg by mouth every evening.    ergocalciferol (ERGOCALCIFEROL) 50,000 unit Cap Take 1 capsule (50,000 Units total) by mouth every 30 days.    gabapentin (NEURONTIN) 100 MG capsule 1 capsule    gabapentin (NEURONTIN) 300 MG capsule Take 1 capsule (300 mg total) by mouth 3 (three) times daily.    levothyroxine (SYNTHROID) 112 MCG tablet Take 1 tablet (112 mcg total) by mouth before breakfast.    LINZESS 290 mcg Cap capsule Take 290 mcg by mouth once daily.    losartan (COZAAR) 100 MG tablet Take 1 tablet (100 mg total) by mouth once daily.    metFORMIN (GLUCOPHAGE-XR) 500 MG ER 24hr tablet     potassium chloride (KLOR-CON) 10 MEQ TbSR Take 10 mEq by mouth every other day.    rosuvastatin (CRESTOR) 20 MG tablet TAKE 1 TABLET BY MOUTH ONCE DAILY IN THE EVENING    simethicone (MYLICON) 125 MG chewable tablet Take 125 mg by mouth every 6 (six) hours as needed for Flatulence.    sodium chloride (OCEAN) 0.65 % nasal spray 1 spray by Nasal route as needed for Congestion.    tiZANidine  (ZANAFLEX) 4 MG tablet Take 4 mg by mouth every 6 (six) hours as needed.    blood-glucose meter kit True Metrix glucometer check glucose once daily    calcium carbonate (TUMS) 200 mg calcium (500 mg) chewable tablet Chew and swallow 2 tablets (1,000 mg total) by mouth 3 (three) times daily. for 14 days     No current facility-administered medications for this visit.     Facility-Administered Medications Ordered in Other Visits   Medication    electrolyte-S (ISOLYTE)    lactated ringers infusion       Review of patient's allergies indicates:   Allergen Reactions    No known drug allergies        Family History   Problem Relation Age of Onset    Colon cancer Other     Cancer Mother     Hypertension Mother     Hyperlipidemia Mother     Cancer Brother     Hypertension Father     Emphysema Father     Diabetes Son     Hypertension Son     Alcohol abuse Son     Diabetes Maternal Aunt     Alzheimer's disease Maternal Uncle     Cancer Maternal Grandmother     No Known Problems Daughter     Dementia Sister     Alzheimer's disease Sister     Breast cancer Sister 60    Obesity Paternal Uncle     Prostate cancer Other     Melanoma Neg Hx     Psoriasis Neg Hx     Lupus Neg Hx     Eczema Neg Hx     Amblyopia Neg Hx     Blindness Neg Hx     Cataracts Neg Hx     Glaucoma Neg Hx     Macular degeneration Neg Hx     Retinal detachment Neg Hx     Strabismus Neg Hx     Stroke Neg Hx     Thyroid cancer Neg Hx        Social History     Socioeconomic History    Marital status:    Tobacco Use    Smoking status: Never Smoker    Smokeless tobacco: Never Used   Substance and Sexual Activity    Alcohol use: Not Currently     Comment: stopped 2019    Drug use: No    Sexual activity: Not Currently       Chief Complaint:   Chief Complaint   Patient presents with    Left Shoulder - Follow-up       History of present illness: This is a 76 year-old female who is seen for left shoulder pain.  It  started after a thyroidectomy on September 13th.  Patient has a lot of pain in the trapezius and neck area.  Extends around the scapula and into the shoulder.  Patient has not been able to sleep in several months.  She is currently in physical therapy but it is not really helping.  Pain at night is significant.  She had the MRI of her shoulder which was pretty unremarkable as far as pathology.  She had a little arthritis in a small partial-thickness cuff tear but that does not explain her symptoms very well.  The brachial plexus MRI showed some acute changes within the trapezius without obvious brachial plexus injury.  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  This is consistent with her symptoms.  Physical therapy does seem to be helping.  Shoulder pain is decreasing down to a 4/10.  Feels like her range of motion is improving as well.    Review of Systems:      Musculoskeletal:  See HPI        Physical Examination:    Vital Signs:    Vitals:    03/03/22 0848   Resp: 18       Body mass index is 24.46 kg/m².    This a well-developed, well nourished patient in no acute distress.  They are alert and oriented and cooperative to examination.  Pt. walks without an antalgic gait.      Examination of the left shoulder shows no rashes or erythema.  There is some obvious drooping of the left shoulder with some scapular protraction. There are no masses, ecchymosis, or atrophy. The patient has full range of motion in forward flexion, external rotation, and internal rotation to the mid T-spine. The patient has moderately positive impingement signs. - Mora's test. - Speeds test. Nontender to palpation over a.c. joint.Passive range of motion: Forward flexion of 180°, external rotation at 90° of 90°, internal rotation of 50°, and external rotation at 0° of 50°. 2+ radial pulse. Intact axillary, radial, median and ulnar sensation. 4 out of 5 resisted forward flexion, external  rotation, and negative lift off test.          X-rays: X-rays of the left shoulder is reviewed which show some AC arthritis otherwise normal-appearing shoulder x-ray    MRI of the left shoulder:Tendinopathy and partial thickness articular surface tearing of the distal supraspinatus tendon.  AC joint arthrosis.  Mild osteoarthritic changes involving the glenohumeral joint space.     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  This is consistent with a spinal accessory nerve injury.  Continue with physical therapy.  She finally seems to making some progress.  I did agree to give her another subacromial injection today.  I will see her back in 6 weeks.

## 2022-03-07 ENCOUNTER — CLINICAL SUPPORT (OUTPATIENT)
Dept: REHABILITATION | Facility: HOSPITAL | Age: 77
End: 2022-03-07
Payer: MEDICARE

## 2022-03-07 DIAGNOSIS — R29.898 DECREASED STRENGTH OF UPPER EXTREMITY: ICD-10-CM

## 2022-03-07 DIAGNOSIS — M53.82 DECREASED RANGE OF MOTION OF INTERVERTEBRAL DISCS OF CERVICAL SPINE: Primary | ICD-10-CM

## 2022-03-07 PROCEDURE — 97110 THERAPEUTIC EXERCISES: CPT | Mod: PN

## 2022-03-07 PROCEDURE — 97112 NEUROMUSCULAR REEDUCATION: CPT | Mod: PN

## 2022-03-07 NOTE — PROGRESS NOTES
[facility]  Outpatient Physical Therapy Daily Treatment Note      Name: Erica Masterson  Clinic Number: [id number]  Visit Date: 3/7/2022    Therapy Diagnosis:   Encounter Diagnoses   Name Primary?    Decreased range of motion of intervertebral discs of cervical spine Yes    Decreased strength of upper extremity        Physician: Adriel Iverson,*   Physician Orders: PT Eval and Treat   Medical Diagnosis from Referral: M25.512 (ICD-10-CM) - Left shoulder pain, unspecified chronicity  Evaluation Date: 1/24/2022  Authorization Period Expiration: 2/27/2022  Plan of Care Expiration: 4/25/2022  Progress Note Due: 2/24/2022  Visit # / Visits authorized: 12 / 17 (12 / 16)  FOTO: 0/3     Precautions: Standard, Diabetes, cancer and thyroidectomy      Time In: 1032 AM  Time Out: 1115 AM  Total Appointment Time (timed & untimed codes): 43 minutes      Subjective     She is so tight in the periscapular region. Reports B upper trap feel like a rock. Reports she needs to move more than what she is doing. Reports she was able to do stairs the other day with decreased chest tightness which is an improvement. Reports continuation of tightness in throat region. Reports she might have only gotten 2 hours of sleep last night because of this.     Response to previous treatment: Positive    Functional change: None stated      Pain: 3-4 /10  Location: left neck  and shoulder      Objective   FOTO 2/25/2022: 61/100 = 39% limitation      Erica received therapeutic exercises to develop strength, endurance, ROM, flexibility, posture and core stabilization for 10 minutes including:    Seated thoracic extensions over bolster  10 x 10 sec   Supine pect stretch over half bolster x 3 min   Standing shrug 3lb DB 2 x 10     NP:   Standing shoulder ER and flexion YTB 2 x 10   Standing shoulder extension RTB 2 x 10   Standing shoulder flexion YTB 2 x 10     Erica received the following manual  therapy techniques: Soft tissue Mobilization were applied to the: cervical region for 0 minutes    Seated first rib inferior glide bilaterally  PA glides along cervical spine grade 2 -NP  Rocket tape with 3 I strips applied for scapular retraction/posture, upper trap facilitation, levator inhibition     Erica participated in neuromuscular re-education activities to improve: Coordination, Kinesthetic, Sense, Proprioception and Posture for 33 minutes. The following activities were included:    Supine serratus punch 3lb DB 1 x 15 reps  Standing serratus punch YTB 2 x 10   Prone row 3lb DB 2 x 10   Prone Y 2 x 10   Attempted prone T and then SL horizontal ABD, significant winging so performed in gravity eliminated position in standing against wall  2 x 15 reps - added to HEP  Standing lift off for lower trap activation 2 x 15 reps -  Added to HEP      Patient Education and HEP     She was compliant with home exercise program.    Education provided:   - HEP     Written Home Exercises Provided: Patient instructed to cont prior HEP.  Exercises were reviewed and Erica was able to demonstrate them prior to the end of the session.  Erica demonstrated good  understanding of the education provided.     See EMR under Patient Instructions for exercises provided prior visit.    Assessment     Tx session focused on scapular positioning and strengthening. She continues to have significant winging and dyskinesia of L scapula with elevation of L arm in multiple positions. Improvement in this noted with performed in gravity eliminated position with hor.ABD. Pectoral muscles continues to demonstrate tightness. Added thoracic extensions to improve thoracic mobility. Added SL ER and seated ER focusing on eccentric control with good tolerance. Reports feeling good following tx session, no increase in pain.     Erica Is progressing well towards her goals.   Pt prognosis is Good.     Pt's spiritual, cultural and educational needs considered and  pt agreeable to plan of care and goals.    Anticipated barriers to physical therapy: history of Thyroid surgery    Goals:    STG  Weeks/Visits Date Established  Goal Status    1. Pt will increase cervical extension AROM to >/= 45 deg to improve her ability to look up  5 weeks/ 10 visits  1/24/2022    MET 2/25/2022   2. Pt will increase cervical AROM rotation by 10 deg to improve ability to look over shoulder while driving  5 weeks/ 10 visits  1/24/2022    Partially MET  3/7/2022      3.Pt will report improvement in quality of sleep with decreased reports of night pain since IE to improve quality of life 5 weeks/ 10 visits  1/24/2022    MET ( per pt she is able to get longer stretches of sleep than prior to IE) 2/25/2022   4. FOTO goal 5 weeks/ 10 visits  1/24/2022    MET 2/25/2022   5.Pt will demonstrate and verbalize compliance and independence with HEP to improve therapy outcomes  5 weeks/ 10 visits  1/24/2022    In progress  3/7/2022      6. Pt will report </= 5/10 current pain and </= 7/10 pain for at worst pain over the past week to improve activity tolerance  5 weeks/ 10 visits  1/24/2022    MET 2/25/2022      LTG Weeks/Visits Date Established  Goal Status    1.Pt will increase LUE strength to >/= 4/5 to improve ability to lift objects  10 weeks/ 16 visits  1/24/2022    MET 2/25/2022   2. Pt will report decreased tightness of neck in the morning to improve functional mobility  10 weeks/ 16 visits  1/24/2022       In progress  3/7/2022      3.FOTO goal 10 weeks/ 16 visits  1/24/2022    In progress  3/7/2022      4. Pt will demonstrate improved seated posture to improve  10 weeks/ 16 visits  1/24/2022     In progress  3/7/2022     5.Pt will report </= 2/10 current pain and </= 4/10 pain for at worst pain over the past week to improve activity tolerance  10 weeks/ 16 visits  1/24/2022 In progress  3/7/2022       Plan   Plan of care Certification: 1/24/2022 to 4/25/2022.     Continue to focus on scapular  positioning, posture, and shelbie scap strength.    Diamond Hampton, PT

## 2022-03-10 ENCOUNTER — TELEPHONE (OUTPATIENT)
Dept: GASTROENTEROLOGY | Facility: CLINIC | Age: 77
End: 2022-03-10
Payer: MEDICARE

## 2022-03-10 NOTE — TELEPHONE ENCOUNTER
----- Message from Constance Garcia sent at 3/10/2022  3:39 PM CST -----  Regarding: pt called  Name of Who is Calling: TODD RODRIGUEZ [[id number]]      What is the request in detail: pt is requesting a call back from the nurse. She is still having problems with burping, nausea , and bubbling stomach. Please advise .      Can the clinic reply by [facility]: NO      What Number to Call Back if not in [facility]: [phone]

## 2022-03-10 NOTE — TELEPHONE ENCOUNTER
Patient states she is still having same symptoms bubbling with stomach burping, gas and nausea she is taking the phayzme not helping please advise patient not taking any PPI at this time since last year?

## 2022-03-14 ENCOUNTER — CLINICAL SUPPORT (OUTPATIENT)
Dept: REHABILITATION | Facility: HOSPITAL | Age: 77
End: 2022-03-14
Payer: MEDICARE

## 2022-03-14 DIAGNOSIS — M53.82 DECREASED RANGE OF MOTION OF INTERVERTEBRAL DISCS OF CERVICAL SPINE: Primary | ICD-10-CM

## 2022-03-14 DIAGNOSIS — R29.898 DECREASED STRENGTH OF UPPER EXTREMITY: ICD-10-CM

## 2022-03-14 PROCEDURE — 97112 NEUROMUSCULAR REEDUCATION: CPT | Mod: PN

## 2022-03-14 PROCEDURE — 97110 THERAPEUTIC EXERCISES: CPT | Mod: PN

## 2022-03-14 NOTE — PROGRESS NOTES
[facility]  Outpatient Physical Therapy Daily Treatment Note      Name: Erica Masterson  Clinic Number: [id number]  Visit Date: 3/14/2022    Therapy Diagnosis:   Encounter Diagnoses   Name Primary?    Decreased range of motion of intervertebral discs of cervical spine Yes    Decreased strength of upper extremity        Physician: Adriel Iverson,*   Physician Orders: PT Eval and Treat   Medical Diagnosis from Referral: M25.512 (ICD-10-CM) - Left shoulder pain, unspecified chronicity  Evaluation Date: 1/24/2022  Authorization Period Expiration: 2/27/2022  Plan of Care Expiration: 4/25/2022  Progress Note Due: 2/24/2022  Visit # / Visits authorized: 13 / 17 (13 / 16)  FOTO: 0/3     Precautions: Standard, Diabetes, cancer and thyroidectomy      Time In: 1037 AM  Time Out: 1125 AM  Total Appointment Time (timed & untimed codes): 48 minutes      Subjective     She slept in the bed Friday and Saturday. Reports Saturday was okay after sleeping in the bed on Friday, but yesterday was worst so she decided to sleep on the couch last night. This was the first time she tried to sleep in the bed in about 1 month. Reports the tightness usually occurs in the throat and chest. Reports she has good days and bad days. Reports yesterday she had 9/10 pain in the L sided chest region and bilateral neck region. Pt would also like the tape today because she felt like it helped some.     Response to previous treatment: Positive    Functional change: None stated      Pain: 3-4 /10  Location: left neck  and shoulder      Objective     Objective measures: next tx session     Erica received therapeutic exercises to develop strength, endurance, ROM, flexibility, posture and core stabilization for 33 minutes including:    UBE 2 min FWD and BWD each way   Supine pect stretch over half bolster x 3 min   Standing shrug 3lb DB 2 x 10   Seated no money ER YTB 20x   Rocket tape with 3 I  strips applied for scapular retraction/posture, upper trap facilitation, levator inhibition   Standing shoulder ER and flexion YTB 2 x 10   Standing shoulder extension RTB 2 x 10     Erica participated in neuromuscular re-education activities to improve: Coordination, Kinesthetic, Sense, Proprioception and Posture for 15 minutes. The following activities were included:    Standing serratus punch YTB 2 x 10  SL open book each way 10x    Supine chin retraction into towel 15x  Supine chin retraction with tuck and lift 15x  Seated chin retraction 15x     NP:  Prone row 3lb DB 2 x 10   Prone Y 2 x 10   Attempted prone T and then SL horizontal ABD, significant winging so performed in gravity eliminated position in standing against wall  2 x 15 reps - added to HEP  Standing lift off for lower trap activation 2 x 15 reps -  Added to HEP      Patient Education and HEP     She was compliant with home exercise program.    Education provided:   - HEP     Written Home Exercises Provided: Patient instructed to cont prior HEP.  Exercises were reviewed and Erica was able to demonstrate them prior to the end of the session.  Erica demonstrated good  understanding of the education provided.     See EMR under Patient Instructions for exercises provided prior visit.    Assessment     Good tolerance to tx session without reports of pain. Continues to demonstrate significant R shoulder depression but this improved following tape and tx session upon leaving clinic. Tx session focused on R shoulder strengthening as well as cervical motor control and posture. She had a difficult time performing cervical retractions in seated position so ceased and performed in supine position with some improvement but continued to require max verbal and tactile cues for proper performance. These were added to HEP to improve flexor strength , motor control and posture. She also demonstrates hypertone suboccipital and L levator scap with tightness with PROM  flexion noted in supine. She required a seated rest break following standing shoulder ER and flexion prior to performing extension. Good form noted with serratus punches today.     Erica Is progressing well towards her goals.   Pt prognosis is Good.     Pt's spiritual, cultural and educational needs considered and pt agreeable to plan of care and goals.    Anticipated barriers to physical therapy: history of Thyroid surgery    Goals:    STG  Weeks/Visits Date Established  Goal Status    1. Pt will increase cervical extension AROM to >/= 45 deg to improve her ability to look up  5 weeks/ 10 visits  1/24/2022    MET 2/25/2022   2. Pt will increase cervical AROM rotation by 10 deg to improve ability to look over shoulder while driving  5 weeks/ 10 visits  1/24/2022    Partially MET  3/14/2022      3.Pt will report improvement in quality of sleep with decreased reports of night pain since IE to improve quality of life 5 weeks/ 10 visits  1/24/2022    MET ( per pt she is able to get longer stretches of sleep than prior to IE) 2/25/2022   4. FOTO goal 5 weeks/ 10 visits  1/24/2022    MET 2/25/2022   5.Pt will demonstrate and verbalize compliance and independence with HEP to improve therapy outcomes  5 weeks/ 10 visits  1/24/2022    In progress  3/14/2022      6. Pt will report </= 5/10 current pain and </= 7/10 pain for at worst pain over the past week to improve activity tolerance  5 weeks/ 10 visits  1/24/2022    MET 2/25/2022      LTG Weeks/Visits Date Established  Goal Status    1.Pt will increase LUE strength to >/= 4/5 to improve ability to lift objects  10 weeks/ 16 visits  1/24/2022    MET 2/25/2022   2. Pt will report decreased tightness of neck in the morning to improve functional mobility  10 weeks/ 16 visits  1/24/2022       In progress  3/14/2022      3.FOTO goal 10 weeks/ 16 visits  1/24/2022    In progress  3/14/2022      4. Pt will demonstrate improved seated posture to improve  10 weeks/ 16 visits   1/24/2022     In progress  3/14/2022     5.Pt will report </= 2/10 current pain and </= 4/10 pain for at worst pain over the past week to improve activity tolerance  10 weeks/ 16 visits  1/24/2022 In progress  3/14/2022       Plan   Plan of care Certification: 1/24/2022 to 4/25/2022.     Continue to focus on scapular positioning, posture, and shelbie scap strength.    Diamond Hampton, PT

## 2022-03-15 ENCOUNTER — OFFICE VISIT (OUTPATIENT)
Dept: ENDOCRINOLOGY | Facility: CLINIC | Age: 77
End: 2022-03-15
Payer: MEDICARE

## 2022-03-15 VITALS
WEIGHT: 149.69 LBS | DIASTOLIC BLOOD PRESSURE: 64 MMHG | TEMPERATURE: 98 F | HEIGHT: 65 IN | SYSTOLIC BLOOD PRESSURE: 118 MMHG | BODY MASS INDEX: 24.94 KG/M2 | OXYGEN SATURATION: 98 % | HEART RATE: 87 BPM

## 2022-03-15 DIAGNOSIS — E55.9 VITAMIN D INSUFFICIENCY: ICD-10-CM

## 2022-03-15 DIAGNOSIS — C73 THYROID CANCER: ICD-10-CM

## 2022-03-15 DIAGNOSIS — M85.88 OSTEOPENIA OF LUMBAR SPINE: ICD-10-CM

## 2022-03-15 DIAGNOSIS — C73 MALIGNANT NEOPLASM OF THYROID GLAND: Primary | ICD-10-CM

## 2022-03-15 DIAGNOSIS — E89.0 POSTOPERATIVE HYPOTHYROIDISM: ICD-10-CM

## 2022-03-15 PROCEDURE — 3074F SYST BP LT 130 MM HG: CPT | Mod: CPTII,S$GLB,, | Performed by: INTERNAL MEDICINE

## 2022-03-15 PROCEDURE — 1159F PR MEDICATION LIST DOCUMENTED IN MEDICAL RECORD: ICD-10-PCS | Mod: CPTII,S$GLB,, | Performed by: INTERNAL MEDICINE

## 2022-03-15 PROCEDURE — 99999 PR PBB SHADOW E&M-EST. PATIENT-LVL III: ICD-10-PCS | Mod: PBBFAC,,, | Performed by: INTERNAL MEDICINE

## 2022-03-15 PROCEDURE — 99214 OFFICE O/P EST MOD 30 MIN: CPT | Mod: S$GLB,,, | Performed by: INTERNAL MEDICINE

## 2022-03-15 PROCEDURE — 99499 RISK ADDL DX/OHS AUDIT: ICD-10-PCS | Mod: S$GLB,,, | Performed by: INTERNAL MEDICINE

## 2022-03-15 PROCEDURE — 1159F MED LIST DOCD IN RCRD: CPT | Mod: CPTII,S$GLB,, | Performed by: INTERNAL MEDICINE

## 2022-03-15 PROCEDURE — 3074F PR MOST RECENT SYSTOLIC BLOOD PRESSURE < 130 MM HG: ICD-10-PCS | Mod: CPTII,S$GLB,, | Performed by: INTERNAL MEDICINE

## 2022-03-15 PROCEDURE — 1126F PR PAIN SEVERITY QUANTIFIED, NO PAIN PRESENT: ICD-10-PCS | Mod: CPTII,S$GLB,, | Performed by: INTERNAL MEDICINE

## 2022-03-15 PROCEDURE — 3078F DIAST BP <80 MM HG: CPT | Mod: CPTII,S$GLB,, | Performed by: INTERNAL MEDICINE

## 2022-03-15 PROCEDURE — 99214 PR OFFICE/OUTPT VISIT, EST, LEVL IV, 30-39 MIN: ICD-10-PCS | Mod: S$GLB,,, | Performed by: INTERNAL MEDICINE

## 2022-03-15 PROCEDURE — 99999 PR PBB SHADOW E&M-EST. PATIENT-LVL III: CPT | Mod: PBBFAC,,, | Performed by: INTERNAL MEDICINE

## 2022-03-15 PROCEDURE — 1160F PR REVIEW ALL MEDS BY PRESCRIBER/CLIN PHARMACIST DOCUMENTED: ICD-10-PCS | Mod: CPTII,S$GLB,, | Performed by: INTERNAL MEDICINE

## 2022-03-15 PROCEDURE — 3078F PR MOST RECENT DIASTOLIC BLOOD PRESSURE < 80 MM HG: ICD-10-PCS | Mod: CPTII,S$GLB,, | Performed by: INTERNAL MEDICINE

## 2022-03-15 PROCEDURE — 1160F RVW MEDS BY RX/DR IN RCRD: CPT | Mod: CPTII,S$GLB,, | Performed by: INTERNAL MEDICINE

## 2022-03-15 PROCEDURE — 1126F AMNT PAIN NOTED NONE PRSNT: CPT | Mod: CPTII,S$GLB,, | Performed by: INTERNAL MEDICINE

## 2022-03-15 PROCEDURE — 99499 UNLISTED E&M SERVICE: CPT | Mod: S$GLB,,, | Performed by: INTERNAL MEDICINE

## 2022-03-15 NOTE — ASSESSMENT & PLAN NOTE
Had been down to 11 in 4/2021   improved with supplementation (was on 50,000 units/week for a while with normalization of vitamin D)  Now on 50,000 units once a month   continue   monitor from time to time

## 2022-03-15 NOTE — ASSESSMENT & PLAN NOTE
s/p surgery 5/2021. Multifocal PTC. 2 cm, +margin, +residual tissue.   Now s/p completion thyroidectomy 9/2021. 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  S/p radioactive iodine ablation with 168.7 mCi 12/15/2021.     - stimulated TG 12     - post-treatment WBS negative for distant disease    For now, goal TSH on the low side.    Repeat thyroglobulin scheduled for next month. Also obtain US at that time    Discussed with pt a few possibilities:   - Thyroglobulin negative, US reassuring: Repeat labs in another 6 months, US in 1 year   - Thyroglobulin negative, US mild increased LN: repeat US 6 months to ensure resolution   - Thyroglobulin high, consider iodine scan vs PET depending on how high, or monitoring after another 3-4 months since iodine may still be working to lower TG further.    Will see how labs and US look and go from there.

## 2022-03-15 NOTE — ASSESSMENT & PLAN NOTE
Goal TSH low side of normal   last TSH (before thyrogen) was too low, so decreased dose slightly   continue same for now   recheck with next labs

## 2022-03-15 NOTE — PROGRESS NOTES
Of note, pt prefers phone call with results. Doesn't check portal all the time.    Subjective:      Chief Complaint: Follow-up, Thyroid Cancer, and Hypothyroidism    HPI: Erica Masterson is a 76 y.o. female who is here for a follow-up evaluation for thyroid. Last seen 1/6/2022. Had recommended f/u in May, pt here 2 months early    For her thyroid cancer:   Nodules were found several years ago, since at least 2019. Established care in endocrine here 3/2021 after repeat US still showed MNG. FNA recommended, completed 4/29/2021 and showed PTC.    Treated with total thyroidectomy on 5/2021.   Path: 2 cm, positive margin on the left. Also multifocal (4 foci, 2-5 mm)   right lobe: benign   no lymph nodes assessed   No angioinvasion, no no lymphatic invasion  Some tumor remained in situ, densely adherent/invading the trachea and RLN.    Post-op thyroglobulin: high, 91.   Presented at tumor board.  consensus was try for repeat surgery then radioactive iodine. Surgery team recommended CT to evaluate for LN.  CT done, found several abnormal lymph nodes, concerning for malignancy.  Saw ENT, had repeat surgery 9/13/2021 5/30 LN + for malignancy.    She was then treated with radioactive iodine 168.7 mCi on 12/15/2021.  Thyrogen-stimulated thyroglobulin level was 12 (TSH 92)  Post-treatment WBS 12/22/2021. Uptake in the neck, no distant disease noted.    Post-operative course has been complicated by neck tightness. Followed with ENT, s/p PT, seeing ortho as well with concerns for spinal accessory nerve injury.    Surveillance:  Last thyroglobulin:  Lab Results   Component Value Date    THYGLBTUM 12 (H) 12/14/2021    THGABSCRN <1.8 12/14/2021     Last neck US: 3/2021, before surgery.    With regards to her post-surgical hypothyroidism:  Current medication:  generic levothyroxine  Current dose: 112 mcg daily    Pt takes thyroid medication properly, in the early morning on an empty stomach with water. Denies missed doses/running  HTN, GERD, IBS w constipation, Class 1 obesity    1/12/2022    Cristalmichaeljuani Ho    Chief Complaint   Patient presents with    Hypertension     Presenting for hypertension. 169/128 at Dr. Rika Hammond.  Cough     feels related to lisinopril       HPI  History was obtained from patient. Ria Leger is a 37 y.o. female with a PMHx as listed below who presents today for   F/u hypertension,     BP good 132/79 today, [facility] nurses checked 169/78 and elevated 169/128 at home. Notes dry cough she feels it is secondary to lisinopril    1. Essential hypertension             REVIEW OF SYMPTOMS    Review of Systems   Constitutional: Negative for chills and fatigue. HENT: Negative for congestion and sore throat. Respiratory: Negative for shortness of breath and wheezing. Cardiovascular: Negative for chest pain and palpitations. Gastrointestinal: Negative for abdominal pain and nausea. Genitourinary: Negative for frequency and urgency. Neurological: Negative for light-headedness.        PAST MEDICAL HISTORY  Past Medical History:   Diagnosis Date    Excessive daytime sleepiness 12/13/2017    Fibromyalgia     GERD (gastroesophageal reflux disease)     Hernia of abdominal wall     Herpes simplex virus (HSV) infection     Hypertension     Hypertension     Morbid obesity ([address] 12/13/2017    Obesity        FAMILY HISTORY  Family History   Problem Relation Age of Onset    Hypertension Mother     Hypertension Father     Hearing Loss Brother     High Blood Pressure Maternal Grandmother     Arthritis Maternal Grandfather        SOCIAL HISTORY  Social History     Socioeconomic History    Marital status: Single     Spouse name: None    Number of children: None    Years of education: None    Highest education level: None   Occupational History    None   Tobacco Use    Smoking status: Never Smoker    Smokeless tobacco: Never Used   Vaping Use    Vaping Use: Never used   Substance and Sexual Activity out.    Lab Results   Component Value Date    TSH 92.916 (H) 12/14/2021    FREET4 1.50 12/14/2021     Had low vitamin D:   Vitamin D: was 11 (4/2021)    Last labs:  Lab Results   Component Value Date    CALCIUM 8.7 12/30/2021    ALBUMIN 3.6 10/04/2021    CREATININE 1.0 12/30/2021    EGOIVEKV11VP 50 10/04/2021    PTH 35.0 10/04/2021      Now on 50,000 units/month    Bones: DXA 4/2021. Osteopenia. -2.4 spine    Current symptoms:  No   Yes  []    [x]  Trouble swallowing, sometimes feels tight in the throat. Worse in the mornings.  [x]    []  Trouble breathing  []    [x]  Voice changes. After surgery. Improved.  [x]    []  Neck swelling    [x]    []  Fatigue. Energy okay, exercise a few days per week.  []    [x]  Constipation/diarrhea. For a while.  [x]    []  Heat/Cold intolerance  [x]    []  Weight gain or weight loss  [x]    []  Palpitations or tremor    Sometimes abd discomfort/gas. Saw GI.    +neck discomfort, tightness, on the left side. Increased after surgery. Seeing neurology, orthopedics, PT. Trouble sleeping in the bed, does better on couch.    Some numbness in the legs at night.    No falls/fractures. No dizziness.      Reviewed past medical, family, social history and updated as appropriate.    Review of Systems  As above    Objective:     Vitals:    03/15/22 1351   BP: 118/64   Pulse: 87   Temp: 98 °F (36.7 °C)     BP Readings from Last 5 Encounters:   03/15/22 118/64   02/15/22 (!) 149/65   02/10/22 126/68   01/24/22 121/67   01/06/22 132/64     Physical Exam  Vitals reviewed.   Constitutional:       General: She is not in acute distress.  Neck:      Thyroid: No thyroid mass, thyromegaly or thyroid tenderness.      Comments: Absent thyroid, +scar  Cardiovascular:      Heart sounds: Normal heart sounds.   Pulmonary:      Effort: Pulmonary effort is normal.     Wt Readings from Last 10 Encounters:   03/15/22 1351 67.9 kg (149 lb 11.1 oz)   03/03/22 0848 66.7 kg (147 lb)   02/18/22 0850 67.1 kg (147 lb 14.9   Alcohol use: Yes     Comment: social    Drug use: No    Sexual activity: None   Other Topics Concern    None   Social History Narrative    None     Social Determinants of Health     Financial Resource Strain: Low Risk     Difficulty of Paying Living Expenses: Not hard at all   Food Insecurity: No Food Insecurity    Worried About Running Out of Food in the Last Year: Never true    Janneth of Food in the Last Year: Never true   Transportation Needs:     Lack of Transportation (Medical): Not on file    Lack of Transportation (Non-Medical):  Not on file   Physical Activity:     Days of Exercise per Week: Not on file    Minutes of Exercise per Session: Not on file   Stress:     Feeling of Stress : Not on file   Social Connections:     Frequency of Communication with Friends and Family: Not on file    Frequency of Social Gatherings with Friends and Family: Not on file    Attends Sabianism Services: Not on file    Active Member of [address] [place] [address] or Organizations: Not on file    Attends Club or Organization Meetings: Not on file    Marital Status: Not on file   Intimate Partner Violence:     Fear of Current or Ex-Partner: Not on file    Emotionally Abused: Not on file    Physically Abused: Not on file    Sexually Abused: Not on file   Housing Stability:     Unable to Pay for Housing in the Last Year: Not on file    Number of [place] in the Last Year: Not on file    Unstable Housing in the Last Year: Not on file        SURGICAL HISTORY  Past Surgical History:   Procedure Laterality Date   [address] & 2014    CHOLECYSTECTOMY  08/15/2018    laprascopic    COLONOSCOPY      DILATION AND CURETTAGE      ENDOMETRIAL ABLATION      HERNIA REPAIR Right 7/16/2021    ROBOTIC EXPLORATION, HERNIA RIGHT [address] [place] performed by Emily Rodriguez MD at [address] [place] [address]    OVARY REMOVAL Right     SALPINGECTOMY Bilateral     UPPER GASTROINTESTINAL ENDOSCOPY  09/11/2017                 CURRENT MEDICATIONS  Current Outpatient Medications   Medication Sig Dispense Refill    amLODIPine (NORVASC) 5 MG tablet Take 1 tablet by mouth daily 30 tablet 5    valACYclovir (VALTREX) 500 MG tablet Take 1 tablet by mouth 2 times daily 20 tablet 3    LINZESS 145 MCG capsule       Indomethacin (INDOCIN PO) Take by mouth PRN      Acetaminophen (TYLENOL 8 HOUR PO) Take by mouth      lisinopril (PRINIVIL;ZESTRIL) 20 MG tablet  (Patient not taking: Reported on 1/12/2022)      omeprazole (PRILOSEC) 40 MG delayed release capsule Take 40 mg by mouth daily (Patient not taking: Reported on 8/24/2021)       No current facility-administered medications for this visit. ALLERGIES  No Known Allergies    PHYSICAL EXAM    /79   Pulse 72   Resp 16   Ht 5' 8\" (1.727 m)   Wt 248 lb (112.5 kg)   LMP 06/15/2019   SpO2 98%   BMI 37.71 kg/m²     Physical Exam  Constitutional:       Appearance: Normal appearance. HENT:      Head: Normocephalic and atraumatic. Eyes:      Extraocular Movements: Extraocular movements intact. Pupils: Pupils are equal, round, and reactive to light. Cardiovascular:      Rate and Rhythm: Normal rate and regular rhythm. Pulses: Normal pulses. Heart sounds: No murmur heard. No friction rub. No gallop. Skin:     General: Skin is warm and dry. Neurological:      General: No focal deficit present. Mental Status: She is alert. Psychiatric:         Mood and Affect: Mood normal.         Behavior: Behavior normal.         ASSESSMENT & PLAN    1. Essential hypertension  Change lisinopril to norvasc, monitor bp f/u 4 weeks  - amLODIPine (NORVASC) 5 MG tablet; Take 1 tablet by mouth daily  Dispense: 30 tablet; Refill: 5    Avoid nsaids  Low salt diet    F/u 4 weeks    Return in about 4 weeks (around 2/9/2022) for hypertension.          Electronically signed by Rosemary Fenton DO on 1/12/2022 oz)   02/15/22 1358 67 kg (147 lb 13.1 oz)   02/10/22 0812 67.6 kg (149 lb 0.5 oz)   02/03/22 1047 66.7 kg (147 lb)   01/24/22 1032 67.1 kg (147 lb 14.9 oz)   01/20/22 0853 68 kg (150 lb)   01/06/22 0954 68.1 kg (150 lb 2.1 oz)   01/04/22 1057 67.2 kg (148 lb 2.4 oz)     Lab Results   Component Value Date    HGBA1C 5.8 (H) 09/13/2021    HGBA1C 5.8 (H) 09/13/2021     Lab Results   Component Value Date    CHOL 163 10/30/2020    HDL 63 10/30/2020    LDLCALC 91.6 10/30/2020    TRIG 42 10/30/2020    CHOLHDL 38.7 10/30/2020     Lab Results   Component Value Date     12/30/2021    K 4.2 12/30/2021     12/30/2021    CO2 27 12/30/2021     12/30/2021    BUN 10 12/30/2021    CREATININE 1.0 12/30/2021    CALCIUM 8.7 12/30/2021    PROT 7.4 10/04/2021    ALBUMIN 3.6 10/04/2021    BILITOT 0.3 10/04/2021    ALKPHOS 46 (L) 10/04/2021    AST 17 10/04/2021    ALT 16 10/04/2021    ANIONGAP 11 12/30/2021    ESTGFRAFRICA >60 12/30/2021    EGFRNONAA 54 (A) 12/30/2021    TSH 92.916 (H) 12/14/2021        Assessment/Plan:     Thyroid cancer  s/p surgery 5/2021. Multifocal PTC. 2 cm, +margin, +residual tissue.   Now s/p completion thyroidectomy 9/2021. 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  S/p radioactive iodine ablation with 168.7 mCi 12/15/2021.     - stimulated TG 12     - post-treatment WBS negative for distant disease    For now, goal TSH on the low side.    Repeat thyroglobulin scheduled for next month. Also obtain US at that time    Discussed with pt a few possibilities:   - Thyroglobulin negative, US reassuring: Repeat labs in another 6 months, US in 1 year   - Thyroglobulin negative, US mild increased LN: repeat US 6 months to ensure resolution   - Thyroglobulin high, consider iodine scan vs PET depending on how high, or monitoring after another 3-4 months since iodine may still be working to lower TG further.    Will see how labs and US look and go from  there.          Postoperative hypothyroidism  Goal TSH low side of normal   last TSH (before thyrogen) was too low, so decreased dose slightly   continue same for now   recheck with next labs    Vitamin D insufficiency  Had been down to 11 in 4/2021   improved with supplementation (was on 50,000 units/week for a while with normalization of vitamin D)  Now on 50,000 units once a month   continue   monitor from time to time      Osteopenia of lumbar spine   DXA 4/2021 -2.4 tscore in spine. FRAX not elevated.   continue vitamin D intake, calcium   avoid falls   repeat DXA 2023      34 minutes of total time spent on the encounter, which includes face to face time and non-face to face time preparing to see the patient (eg, review of tests), Obtaining and/or reviewing separately obtained history, Documenting clinical information in the electronic or other health record, Independently interpreting results (not separately reported) and communicating results to the patient/family/caregiver, or Care coordination (not separately reported).      Labs and US 1 month.    Follow-up 6 months with repeat labs before        Juan M Landrum MD  Endocrinology

## 2022-03-16 ENCOUNTER — TELEPHONE (OUTPATIENT)
Dept: GASTROENTEROLOGY | Facility: CLINIC | Age: 77
End: 2022-03-16

## 2022-03-16 ENCOUNTER — TELEPHONE (OUTPATIENT)
Dept: SURGERY | Facility: CLINIC | Age: 77
End: 2022-03-16
Payer: MEDICARE

## 2022-03-16 ENCOUNTER — HOSPITAL ENCOUNTER (EMERGENCY)
Facility: HOSPITAL | Age: 77
Discharge: HOME | End: 2022-03-16
Attending: EMERGENCY MEDICINE
Payer: MEDICARE

## 2022-03-16 ENCOUNTER — OFFICE VISIT (OUTPATIENT)
Dept: GASTROENTEROLOGY | Facility: CLINIC | Age: 77
End: 2022-03-16
Payer: MEDICARE

## 2022-03-16 ENCOUNTER — TELEPHONE (OUTPATIENT)
Dept: GASTROENTEROLOGY | Facility: CLINIC | Age: 77
End: 2022-03-16
Payer: MEDICARE

## 2022-03-16 ENCOUNTER — TELEPHONE (OUTPATIENT)
Dept: FAMILY MEDICINE | Facility: CLINIC | Age: 77
End: 2022-03-16
Payer: MEDICARE

## 2022-03-16 VITALS
SYSTOLIC BLOOD PRESSURE: 138 MMHG | HEART RATE: 80 BPM | HEIGHT: 66 IN | WEIGHT: 149.25 LBS | DIASTOLIC BLOOD PRESSURE: 65 MMHG | BODY MASS INDEX: 23.99 KG/M2

## 2022-03-16 VITALS
HEART RATE: 78 BPM | HEIGHT: 66 IN | DIASTOLIC BLOOD PRESSURE: 83 MMHG | SYSTOLIC BLOOD PRESSURE: 154 MMHG | WEIGHT: 149 LBS | OXYGEN SATURATION: 99 % | TEMPERATURE: 98 F | RESPIRATION RATE: 18 BRPM | BODY MASS INDEX: 23.95 KG/M2

## 2022-03-16 DIAGNOSIS — K21.9 GASTROESOPHAGEAL REFLUX DISEASE, UNSPECIFIED WHETHER ESOPHAGITIS PRESENT: ICD-10-CM

## 2022-03-16 DIAGNOSIS — K44.9 HIATAL HERNIA: ICD-10-CM

## 2022-03-16 DIAGNOSIS — K62.3 RECTAL PROLAPSE: ICD-10-CM

## 2022-03-16 DIAGNOSIS — Z87.19 HISTORY OF CONSTIPATION: ICD-10-CM

## 2022-03-16 DIAGNOSIS — Z87.19 HISTORY OF GASTRITIS: ICD-10-CM

## 2022-03-16 DIAGNOSIS — R10.13 EPIGASTRIC PAIN: ICD-10-CM

## 2022-03-16 DIAGNOSIS — R10.30 LOWER ABDOMINAL PAIN: ICD-10-CM

## 2022-03-16 DIAGNOSIS — R11.0 NAUSEA: Primary | ICD-10-CM

## 2022-03-16 DIAGNOSIS — K62.3 PARTIAL RECTAL PROLAPSE: Primary | ICD-10-CM

## 2022-03-16 DIAGNOSIS — R19.5 CHANGE IN STOOL CALIBER: ICD-10-CM

## 2022-03-16 LAB
ALBUMIN SERPL BCP-MCNC: 3.4 G/DL (ref 3.5–5.2)
ALP SERPL-CCNC: 39 U/L (ref 55–135)
ALT SERPL W/O P-5'-P-CCNC: 21 U/L (ref 10–44)
ANION GAP SERPL CALC-SCNC: 11 MMOL/L (ref 8–16)
AST SERPL-CCNC: 19 U/L (ref 10–40)
BACTERIA #/AREA URNS HPF: ABNORMAL /HPF
BASOPHILS # BLD AUTO: 0.03 K/UL (ref 0–0.2)
BASOPHILS NFR BLD: 0.4 % (ref 0–1.9)
BILIRUB SERPL-MCNC: 0.4 MG/DL (ref 0.1–1)
BILIRUB UR QL STRIP: NEGATIVE
BUN SERPL-MCNC: 26 MG/DL (ref 8–23)
CALCIUM SERPL-MCNC: 8.1 MG/DL (ref 8.7–10.5)
CHLORIDE SERPL-SCNC: 105 MMOL/L (ref 95–110)
CLARITY UR: CLEAR
CO2 SERPL-SCNC: 26 MMOL/L (ref 23–29)
COLOR UR: YELLOW
CREAT SERPL-MCNC: 1.1 MG/DL (ref 0.5–1.4)
DIFFERENTIAL METHOD: ABNORMAL
EOSINOPHIL # BLD AUTO: 0.1 K/UL (ref 0–0.5)
EOSINOPHIL NFR BLD: 1 % (ref 0–8)
ERYTHROCYTE [DISTWIDTH] IN BLOOD BY AUTOMATED COUNT: 14.3 % (ref 11.5–14.5)
EST. GFR  (AFRICAN AMERICAN): 56 ML/MIN/1.73 M^2
EST. GFR  (NON AFRICAN AMERICAN): 48 ML/MIN/1.73 M^2
GLUCOSE SERPL-MCNC: 120 MG/DL (ref 70–110)
GLUCOSE UR QL STRIP: NEGATIVE
HCT VFR BLD AUTO: 36.3 % (ref 37–48.5)
HGB BLD-MCNC: 11.4 G/DL (ref 12–16)
HGB UR QL STRIP: ABNORMAL
HYALINE CASTS #/AREA URNS LPF: 1 /LPF
IMM GRANULOCYTES # BLD AUTO: 0.02 K/UL (ref 0–0.04)
IMM GRANULOCYTES NFR BLD AUTO: 0.3 % (ref 0–0.5)
KETONES UR QL STRIP: NEGATIVE
LEUKOCYTE ESTERASE UR QL STRIP: NEGATIVE
LYMPHOCYTES # BLD AUTO: 1.6 K/UL (ref 1–4.8)
LYMPHOCYTES NFR BLD: 23.2 % (ref 18–48)
MCH RBC QN AUTO: 28.4 PG (ref 27–31)
MCHC RBC AUTO-ENTMCNC: 31.4 G/DL (ref 32–36)
MCV RBC AUTO: 90 FL (ref 82–98)
MICROSCOPIC COMMENT: ABNORMAL
MONOCYTES # BLD AUTO: 0.5 K/UL (ref 0.3–1)
MONOCYTES NFR BLD: 7.8 % (ref 4–15)
NEUTROPHILS # BLD AUTO: 4.6 K/UL (ref 1.8–7.7)
NEUTROPHILS NFR BLD: 67.3 % (ref 38–73)
NITRITE UR QL STRIP: NEGATIVE
NRBC BLD-RTO: 0 /100 WBC
PH UR STRIP: 6 [PH] (ref 5–8)
PLATELET # BLD AUTO: 252 K/UL (ref 150–450)
PMV BLD AUTO: 8.8 FL (ref 9.2–12.9)
POTASSIUM SERPL-SCNC: 4.6 MMOL/L (ref 3.5–5.1)
PROT SERPL-MCNC: 7 G/DL (ref 6–8.4)
PROT UR QL STRIP: NEGATIVE
RBC # BLD AUTO: 4.02 M/UL (ref 4–5.4)
RBC #/AREA URNS HPF: 12 /HPF (ref 0–4)
SODIUM SERPL-SCNC: 142 MMOL/L (ref 136–145)
SP GR UR STRIP: 1.01 (ref 1–1.03)
SQUAMOUS #/AREA URNS HPF: 2 /HPF
T4 FREE SERPL-MCNC: 1.37 NG/DL (ref 0.71–1.51)
TSH SERPL DL<=0.005 MIU/L-ACNC: <0.01 UIU/ML (ref 0.4–4)
URN SPEC COLLECT METH UR: ABNORMAL
UROBILINOGEN UR STRIP-ACNC: NEGATIVE EU/DL
WBC # BLD AUTO: 6.9 K/UL (ref 3.9–12.7)
WBC #/AREA URNS HPF: 1 /HPF (ref 0–5)

## 2022-03-16 PROCEDURE — 1160F PR REVIEW ALL MEDS BY PRESCRIBER/CLIN PHARMACIST DOCUMENTED: ICD-10-PCS | Mod: CPTII,S$GLB,,

## 2022-03-16 PROCEDURE — 85025 COMPLETE CBC W/AUTO DIFF WBC: CPT | Performed by: EMERGENCY MEDICINE

## 2022-03-16 PROCEDURE — 3078F PR MOST RECENT DIASTOLIC BLOOD PRESSURE < 80 MM HG: ICD-10-PCS | Mod: CPTII,S$GLB,,

## 2022-03-16 PROCEDURE — 99214 PR OFFICE/OUTPT VISIT, EST, LEVL IV, 30-39 MIN: ICD-10-PCS | Mod: S$GLB,,,

## 2022-03-16 PROCEDURE — 1159F MED LIST DOCD IN RCRD: CPT | Mod: CPTII,S$GLB,,

## 2022-03-16 PROCEDURE — 3075F SYST BP GE 130 - 139MM HG: CPT | Mod: CPTII,S$GLB,,

## 2022-03-16 PROCEDURE — 84443 ASSAY THYROID STIM HORMONE: CPT | Performed by: EMERGENCY MEDICINE

## 2022-03-16 PROCEDURE — 1125F AMNT PAIN NOTED PAIN PRSNT: CPT | Mod: CPTII,S$GLB,,

## 2022-03-16 PROCEDURE — 87389 HIV-1 AG W/HIV-1&-2 AB AG IA: CPT | Performed by: EMERGENCY MEDICINE

## 2022-03-16 PROCEDURE — 99214 OFFICE O/P EST MOD 30 MIN: CPT | Mod: S$GLB,,,

## 2022-03-16 PROCEDURE — 3078F DIAST BP <80 MM HG: CPT | Mod: CPTII,S$GLB,,

## 2022-03-16 PROCEDURE — 25500020 PHARM REV CODE 255: Performed by: EMERGENCY MEDICINE

## 2022-03-16 PROCEDURE — 1125F PR PAIN SEVERITY QUANTIFIED, PAIN PRESENT: ICD-10-PCS | Mod: CPTII,S$GLB,,

## 2022-03-16 PROCEDURE — 84439 ASSAY OF FREE THYROXINE: CPT | Performed by: EMERGENCY MEDICINE

## 2022-03-16 PROCEDURE — 36415 COLL VENOUS BLD VENIPUNCTURE: CPT | Performed by: EMERGENCY MEDICINE

## 2022-03-16 PROCEDURE — 99999 PR PBB SHADOW E&M-EST. PATIENT-LVL IV: CPT | Mod: PBBFAC,,,

## 2022-03-16 PROCEDURE — 1159F PR MEDICATION LIST DOCUMENTED IN MEDICAL RECORD: ICD-10-PCS | Mod: CPTII,S$GLB,,

## 2022-03-16 PROCEDURE — 99285 EMERGENCY DEPT VISIT HI MDM: CPT | Mod: 25

## 2022-03-16 PROCEDURE — 80053 COMPREHEN METABOLIC PANEL: CPT | Performed by: EMERGENCY MEDICINE

## 2022-03-16 PROCEDURE — 99999 PR PBB SHADOW E&M-EST. PATIENT-LVL IV: ICD-10-PCS | Mod: PBBFAC,,,

## 2022-03-16 PROCEDURE — 3075F PR MOST RECENT SYSTOLIC BLOOD PRESS GE 130-139MM HG: ICD-10-PCS | Mod: CPTII,S$GLB,,

## 2022-03-16 PROCEDURE — 86803 HEPATITIS C AB TEST: CPT | Performed by: EMERGENCY MEDICINE

## 2022-03-16 PROCEDURE — 1160F RVW MEDS BY RX/DR IN RCRD: CPT | Mod: CPTII,S$GLB,,

## 2022-03-16 PROCEDURE — 81000 URINALYSIS NONAUTO W/SCOPE: CPT | Performed by: EMERGENCY MEDICINE

## 2022-03-16 RX ORDER — ONDANSETRON 4 MG/1
4 TABLET, FILM COATED ORAL EVERY 12 HOURS PRN
Qty: 30 TABLET | Refills: 0 | Status: SHIPPED | OUTPATIENT
Start: 2022-03-16 | End: 2022-03-31

## 2022-03-16 RX ADMIN — IOHEXOL 75 ML: 350 INJECTION, SOLUTION INTRAVENOUS at 04:03

## 2022-03-16 NOTE — TELEPHONE ENCOUNTER
Call placed to Ms. Maldonado in regards to message received. She stated that she went to the ER this morning and they told her she had a rectal prolapse. I advised her that they wanted her to f/u with Dr. Patel the colorectal doctor. She verbalized understanding. I advised I would send a message to his staff about getting an appt. No further issues noted.

## 2022-03-16 NOTE — TELEPHONE ENCOUNTER
----- Message from Tim Mckeon sent at 3/16/2022  8:00 AM CDT -----  Type: Needs Medical Advice  Who Called:  Patient  Symptoms (please be specific): Nausea, rectal pain, gas, narrow stool  How long has patient had these symptoms:  ER visit this morning    Pharmacy name and phone #:    [id number] - [place], [place] - [address]  [place] [address]  Phone: [phone] Fax: [phone]      Best Call Back Number: [phone]  Additional Information: Please call to advise

## 2022-03-16 NOTE — ED NOTES
Symptoms started about few week now, tonight woke up in pain with irregular stool pattern, denies blood in her stools.

## 2022-03-16 NOTE — TELEPHONE ENCOUNTER
Patient contacted GI Department regarding this matter as well. Note GI encounter on today's date stating patient requested something for nausea and that patient has an appointment today with GI at 1:30 pm. Call placed to patient for notification to follow up with GI at appointment later day. Patient verbalized understanding.

## 2022-03-16 NOTE — TELEPHONE ENCOUNTER
----- Message from Jud Bennett sent at 3/16/2022  8:55 AM CDT -----  Contact: TODD RODRIGUEZ [[id number]]  Type: Patient Call Back    Who called:TODD RODRIGUEZ [[id number]]    What is the request in detail: The patient would like a call in regards to nausea     Can the clinic reply by [facility]?    Would the patient rather a call back or a response via [facility]?     Best call back number: [phone] (mobile)    Additional Information:

## 2022-03-16 NOTE — TELEPHONE ENCOUNTER
I called patient to schedule her for severe abdominal pain with Carmenza Villa PA-C on Thursday, 3/17/22 @ 9:45am at [facility], [address]  Patient states that she's seeing Deja Mendoza, NP today @ 1:30pm for nausea.  I informed her to talk to the NP about her stomach and she may not need to see Carmenza Villa PA-C tomorrow.  I scheduled her to see Dr. Patel in [place] on Tuesday, 3/29/22 @ 1:30pm for her rectal prolapse. Zhang

## 2022-03-16 NOTE — ED PROVIDER NOTES
Encounter Date: 3/16/2022       History     Chief Complaint   Patient presents with    Nausea    Abdominal Cramping     Patient states that it look like her rectum is prolapsing. Symptoms have been intermittently for about 3 weeks.     Chief complaint:  Rectal pain    HPI:  76-year-old female presents with a sensation of feeling like her rectum it is sticking out.  She reports for the past 3 weeks she has noticed narrow caliber stools.  She has lower abdominal pain with nausea with no vomiting.  She denies any diarrhea.  She has had no fever, melena or hematochezia.  She reports a normal colonoscopy 1 year ago.  Past medical history is significant for anxiety, thyroid cancer status post thyroidectomy, hypertension, hyperlipidemia and multi nodular goiter.        Review of patient's allergies indicates:  No Known Allergies  Past Medical History:   Diagnosis Date    Allergy     Anxiety     Cataract     Colon polyp     Degenerative arthritis of knee 4/3/2012    Diverticulosis     GERD (gastroesophageal reflux disease)     History of thyroid cancer 2021    Hyperlipidemia     Hypertension     Multinodular goiter 3/26/2012    Myopathy, unspecified 1/18/2010    Tuberculosis      Past Surgical History:   Procedure Laterality Date    BREAST BIOPSY Left 2015    benign    COLONOSCOPY  12/2/15    Dr. Britton, multiple polyps, recheck five years-three years if more than 2 polyps are adenomatous    COLONOSCOPY N/A 12/2/2015    COLONOSCOPY N/A 3/20/2019    Procedure: COLONOSCOPY;  Surgeon: Jose Britton MD;  Location: [facility];  Service: Endoscopy;  Laterality: N/A;    COLONOSCOPY N/A 5/20/2020    Dr. Britton; internal hemorrhoids; diverticulosis; polyps removed; repeat in 3 years    CYSTOSCOPY N/A 8/26/2020    Procedure: CYSTOSCOPY;  Surgeon: Charlotte Walters Jr., MD;  Location: [facility];  Service: Urology;  Laterality: N/A;    DISSECTION OF NECK Left 9/13/2021    Procedure: DISSECTION, NECK;  Surgeon:  Ryan Torres MD;  Location: [facility] OR 2ND FLR;  Service: ENT;  Laterality: Left;    ESOPHAGOGASTRODUODENOSCOPY N/A 5/20/2020    Dr. Britton; small hiatal hernia; gastritis; 8 gastric polyps removed    FOOT SURGERY      HYSTERECTOMY      TVH/BSO > 20 years    INTRALUMINAL GASTROINTESTINAL TRACT IMAGING VIA CAPSULE N/A 6/4/2020    Procedure: IMAGING PROCEDURE, GI TRACT, INTRALUMINAL, VIA CAPSULE;  Surgeon: Jose Britton MD;  Location: [facility] ENDO;  Service: Endoscopy;  Laterality: N/A;    KNEE SURGERY  06/06/2013    right knee tear Dr Iverson     LAPAROSCOPIC CHOLECYSTECTOMY N/A 9/17/2020    Procedure: CHOLECYSTECTOMY, LAPAROSCOPIC;  Surgeon: Forrest Veronica MD;  Location: [facility] OR;  Service: General;  Laterality: N/A;    LUNG LOBECTOMY  1966    right middle lobectomy, due to Tb    OOPHORECTOMY      SHOULDER SURGERY      francis     THYROIDECTOMY Bilateral 5/19/2021    Procedure: THYROIDECTOMY;  Surgeon: Jamia Amezquita MD;  Location: [facility] OR 2ND FLR;  Service: General;  Laterality: Bilateral;    THYROIDECTOMY Bilateral 9/13/2021    Procedure: THYROIDECTOMY WITH INTRA-OP PTH;  Surgeon: Ryan Torres MD;  Location: [facility] OR 2ND FLR;  Service: ENT;  Laterality: Bilateral;  NIM tube  Intraop PTH     Family History   Problem Relation Age of Onset    Colon cancer Other     Cancer Mother     Hypertension Mother     Hyperlipidemia Mother     Cancer Brother     Hypertension Father     Emphysema Father     Diabetes Son     Hypertension Son     Alcohol abuse Son     Diabetes Maternal Aunt     Alzheimer's disease Maternal Uncle     Cancer Maternal Grandmother     No Known Problems Daughter     Dementia Sister     Alzheimer's disease Sister     Breast cancer Sister 60    Obesity Paternal Uncle     Prostate cancer Other     Melanoma Neg Hx     Psoriasis Neg Hx     Lupus Neg Hx     Eczema Neg Hx     Amblyopia Neg Hx     Blindness Neg Hx     Cataracts Neg Hx     Glaucoma Neg Hx      Macular degeneration Neg Hx     Retinal detachment Neg Hx     Strabismus Neg Hx     Stroke Neg Hx     Thyroid cancer Neg Hx      Social History     Tobacco Use    Smoking status: Never Smoker    Smokeless tobacco: Never Used   Substance Use Topics    Alcohol use: Not Currently     Comment: stopped 2019    Drug use: No     Review of Systems   Constitutional: Negative for activity change, appetite change, chills, fatigue and fever.   Eyes: Negative for visual disturbance.   Respiratory: Negative for apnea and shortness of breath.    Cardiovascular: Negative for chest pain and palpitations.   Gastrointestinal: Positive for nausea and rectal pain. Negative for abdominal distention, abdominal pain and anal bleeding.   Genitourinary: Negative for difficulty urinating.   Musculoskeletal: Negative for neck pain.   Skin: Negative for pallor and rash.   Neurological: Negative for headaches.   Hematological: Does not bruise/bleed easily.   Psychiatric/Behavioral: Negative for agitation.       Physical Exam     Initial Vitals [03/16/22 0250]   BP Pulse Resp Temp SpO2   (!) 154/71 101 18 98.1 °F (36.7 °C) 99 %      MAP       --         Physical Exam    Nursing note and vitals reviewed.  Constitutional: She appears well-developed and well-nourished.   HENT:   Head: Normocephalic and atraumatic.   Eyes: Conjunctivae are normal.   Neck: Neck supple.   Normal range of motion.  Cardiovascular: Normal rate, regular rhythm and normal heart sounds. Exam reveals no gallop and no friction rub.    No murmur heard.  Pulmonary/Chest: Effort normal and breath sounds normal. No respiratory distress. She has no wheezes. She has no rhonchi. She has no rales.   Abdominal: Abdomen is soft. She exhibits no distension. There is no abdominal tenderness.   Genitourinary:    Genitourinary Comments: Rectal prolapse     Musculoskeletal:         General: Normal range of motion.      Cervical back: Normal range of motion and neck supple.      Neurological: She is alert and oriented to person, place, and time.   Skin: Skin is warm and dry. No erythema.   Psychiatric: She has a normal mood and affect.         ED Course   Procedures  Labs Reviewed   CBC W/ AUTO DIFFERENTIAL - Abnormal; Notable for the following components:       Result Value    Hemoglobin 11.4 (*)     Hematocrit 36.3 (*)     MCHC 31.4 (*)     MPV 8.8 (*)     All other components within normal limits   COMPREHENSIVE METABOLIC PANEL - Abnormal; Notable for the following components:    Glucose 120 (*)     BUN 26 (*)     Calcium 8.1 (*)     Albumin 3.4 (*)     Alkaline Phosphatase 39 (*)     eGFR if  56 (*)     eGFR if non  48 (*)     All other components within normal limits   URINALYSIS, REFLEX TO URINE CULTURE - Abnormal; Notable for the following components:    Occult Blood UA 1+ (*)     All other components within normal limits    Narrative:     Specimen Source->Urine   TSH - Abnormal; Notable for the following components:    TSH <0.010 (*)     All other components within normal limits   URINALYSIS MICROSCOPIC - Abnormal; Notable for the following components:    RBC, UA 12 (*)     All other components within normal limits    Narrative:     Specimen Source->Urine   HIV 1 / 2 ANTIBODY   HEPATITIS C ANTIBODY   T4, FREE          Imaging Results          CT Abdomen Pelvis With Contrast (In process)                  Medications   iohexoL (OMNIPAQUE 350) injection 75 mL (75 mLs Intravenous Given 3/16/22 0420)     Medical Decision Making:   ED Management:  76-year-old female presents with lower abdominal pain, 3 week history of narrow caliber stools and rectal prolapse.  She has a recent colonoscopy with colon cancer less likely.  She is referred to colorectal surgery for evaluation of her rectal prolapse.  CT of the abdomen and pelvis fails to demonstrate any acute findings.                      Clinical Impression:   Final diagnoses:  [K62.3] Partial rectal  prolapse (Primary)          ED Disposition Condition    Discharge Stable        ED Prescriptions     None        Follow-up Information     Follow up With Specialties Details Why Contact Info    Wilian Patel MD General Surgery, Colon and Rectal Surgery, Surgery In 3 days  [address] [facility]  [address]  [facility] [address]  [phone]             Chan Young III, MD  03/16/22 6365

## 2022-03-16 NOTE — TELEPHONE ENCOUNTER
----- Message from Mack Guerra sent at 3/16/2022  3:34 PM CDT -----  Contact: Self  Type:  Sooner Apoointment Request    Caller is requesting a sooner appointment.  Caller declined first available appointment listed below.  Caller will not accept being placed on the waitlist and is requesting a message be sent to doctor.    Name of Caller:  Patient  When is the first available appointment?  N/a  Symptoms:  r/s  Best Call Back Number:  [phone]   Additional Information:   Called to r/s endoscopy from 3/28

## 2022-03-16 NOTE — PROGRESS NOTES
"Subjective:       Patient ID: Erica Masterson is a 76 y.o. female Body mass index is 24.09 kg/m².    Chief Complaint: Nausea    This patient is new to me.  Established patient of Dr. Britton.     Reviewed hospital discharge summary report from 03/16/2022,  "Medical Decision Making: ED Management:  76-year-old female presents with lower abdominal pain, 3 week history of narrow caliber stools and rectal prolapse.  She has a recent colonoscopy with colon cancer less likely.  She is referred to colorectal surgery for evaluation of her rectal prolapse.  CT of the abdomen and pelvis fails to demonstrate any acute findings."    GI Problem  The primary symptoms include abdominal pain (reports intermittent epigastric and lower abdominal pain and vibrating sensation at night when lying down) and nausea. Primary symptoms do not include fever, weight loss, fatigue, vomiting, diarrhea, melena, hematemesis, jaundice, hematochezia, dysuria or rash.   The abdominal pain began more than 2 days ago. The abdominal pain has been gradually worsening since its onset. The abdominal pain is located in the epigastric region, LLQ and RLQ. The abdominal pain does not radiate. The abdominal pain is relieved by nothing.   Nausea began more than 1 week ago (started months ago). The nausea is associated with eating.   The illness is also significant for constipation (reports a change in stool caliber ~3 weeks ago - pencil thin; denies constipation currently - having 2-3 bowel movements daily using Miralax; denies current linzeses use; denies straining, rectal pain, rectal bleeding, or black/tarry stools). The illness does not include chills, anorexia, dysphagia, odynophagia or bloating. Significant associated medical issues include GERD. Associated medical issues do not include inflammatory bowel disease, gallstones, liver disease, alcohol abuse, PUD, gastric bypass, bowel resection, irritable bowel syndrome, hemorrhoids or diverticulitis. "     Review of Systems   Constitutional: Negative for activity change, appetite change, chills, diaphoresis, fatigue, fever, unexpected weight change and weight loss.   HENT: Negative for sore throat and trouble swallowing.    Respiratory: Negative for cough, choking and shortness of breath.    Cardiovascular: Negative for chest pain.   Gastrointestinal: Positive for abdominal pain (reports intermittent epigastric and lower abdominal pain and vibrating sensation at night when lying down), constipation (reports a change in stool caliber ~3 weeks ago - pencil thin; denies constipation currently - having 2-3 bowel movements daily using Miralax; denies current linzeses use; denies straining, rectal pain, rectal bleeding, or black/tarry stools) and nausea. Negative for abdominal distention, anal bleeding, anorexia, bloating, blood in stool, diarrhea, dysphagia, hematemesis, hematochezia, jaundice, melena, rectal pain (history of rectal prolapse) and vomiting.   Genitourinary: Negative for dysuria.   Skin: Negative for rash.       No LMP recorded. Patient has had a hysterectomy.  Past Medical History:   Diagnosis Date    Allergy     Anxiety     Cataract     Colon polyp     Degenerative arthritis of knee 4/3/2012    Diverticulosis     GERD (gastroesophageal reflux disease)     History of thyroid cancer 2021    Hyperlipidemia     Hypertension     Multinodular goiter 3/26/2012    Myopathy, unspecified 1/18/2010    Tuberculosis      Past Surgical History:   Procedure Laterality Date    BREAST BIOPSY Left 2015    benign    CHOLECYSTECTOMY      COLONOSCOPY  12/2/15    Dr. Britton, multiple polyps, recheck five years-three years if more than 2 polyps are adenomatous    COLONOSCOPY N/A 12/2/2015    COLONOSCOPY N/A 3/20/2019    Procedure: COLONOSCOPY;  Surgeon: Jose Britton MD;  Location: [facility];  Service: Endoscopy;  Laterality: N/A;    COLONOSCOPY N/A 5/20/2020    Dr. Britton; internal hemorrhoids;  diverticulosis; polyps removed; repeat in 3 years    CYSTOSCOPY N/A 8/26/2020    Procedure: CYSTOSCOPY;  Surgeon: Charlotte Walters Jr., MD;  Location[facility];  Service: Urology;  Laterality: N/A;    DISSECTION OF NECK Left 9/13/2021    Procedure: DISSECTION, NECK;  Surgeon: Ryan Torres MD;  [facility];  Service: ENT;  Laterality: Left;    ESOPHAGOGASTRODUODENOSCOPY N/A 5/20/2020    Dr. Britton; small hiatal hernia; gastritis; 8 gastric polyps removed    FOOT SURGERY      HYSTERECTOMY      TVH/BSO > 20 years    INTRALUMINAL GASTROINTESTINAL TRACT IMAGING VIA CAPSULE N/A 6/4/2020    Procedure: IMAGING PROCEDURE, GI TRACT, INTRALUMINAL, VIA CAPSULE;  Surgeon: Jose Britton MD;  Location[facility];  Service: Endoscopy;  Laterality: N/A;    KNEE SURGERY  06/06/2013    right knee tear Dr Iverson     LAPAROSCOPIC CHOLECYSTECTOMY N/A 9/17/2020    Procedure: CHOLECYSTECTOMY, LAPAROSCOPIC;  Surgeon: Forrest Veronica MD;  Location: [facility];  Service: General;  Laterality: N/A;    LUNG LOBECTOMY  1966    right middle lobectomy, due to Tb    OOPHORECTOMY      SHOULDER SURGERY      francis     THYROIDECTOMY Bilateral 5/19/2021    Procedure: THYROIDECTOMY;  Surgeon: Jamia Amezquita MD;  Location: [facility] OR [address] [place] [address];  Service: General;  Laterality: Bilateral;    THYROIDECTOMY Bilateral 9/13/2021    Procedure: THYROIDECTOMY WITH INTRA-OP PTH;  Surgeon: Ryan Torres MD;  Location: [facility] OR [address] [place] [address];  Service: ENT;  Laterality: Bilateral;  NIM tube  Intraop PTH     Family History   Problem Relation Age of Onset    Colon cancer Other     Cancer Mother     Hypertension Mother     Hyperlipidemia Mother     Cancer Brother     Hypertension Father     Emphysema Father     Diabetes Son     Hypertension Son     Alcohol abuse Son     Diabetes Maternal Aunt     Alzheimer's disease Maternal Uncle     Cancer Maternal Grandmother     No Known Problems Daughter     Dementia Sister      Alzheimer's disease Sister     Breast cancer Sister 60    Obesity Paternal Uncle     Prostate cancer Other     Melanoma Neg Hx     Psoriasis Neg Hx     Lupus Neg Hx     Eczema Neg Hx     Amblyopia Neg Hx     Blindness Neg Hx     Cataracts Neg Hx     Glaucoma Neg Hx     Macular degeneration Neg Hx     Retinal detachment Neg Hx     Strabismus Neg Hx     Stroke Neg Hx     Thyroid cancer Neg Hx     Colon polyps Neg Hx     Ulcerative colitis Neg Hx     Stomach cancer Neg Hx     Rectal cancer Neg Hx      Social History     Tobacco Use    Smoking status: Never Smoker    Smokeless tobacco: Never Used   Substance Use Topics    Alcohol use: Not Currently     Comment: stopped 2019    Drug use: No     Wt Readings from Last 10 Encounters:   03/16/22 67.7 kg (149 lb 4 oz)   03/16/22 67.6 kg (149 lb)   03/15/22 67.9 kg (149 lb 11.1 oz)   03/03/22 66.7 kg (147 lb)   02/18/22 67.1 kg (147 lb 14.9 oz)   02/15/22 67 kg (147 lb 13.1 oz)   02/10/22 67.6 kg (149 lb 0.5 oz)   02/03/22 66.7 kg (147 lb)   01/24/22 67.1 kg (147 lb 14.9 oz)   01/20/22 68 kg (150 lb)     Lab Results   Component Value Date    WBC 6.90 03/16/2022    HGB 11.4 (L) 03/16/2022    HCT 36.3 (L) 03/16/2022    MCV 90 03/16/2022     03/16/2022     CMP  Sodium   Date Value Ref Range Status   03/16/2022 142 136 - 145 mmol/L Final     Potassium   Date Value Ref Range Status   03/16/2022 4.6 3.5 - 5.1 mmol/L Final     Chloride   Date Value Ref Range Status   03/16/2022 105 95 - 110 mmol/L Final     CO2   Date Value Ref Range Status   03/16/2022 26 23 - 29 mmol/L Final     Glucose   Date Value Ref Range Status   03/16/2022 120 (H) 70 - 110 mg/dL Final     BUN   Date Value Ref Range Status   03/16/2022 26 (H) 8 - 23 mg/dL Final     Creatinine   Date Value Ref Range Status   03/16/2022 1.1 0.5 - 1.4 mg/dL Final   06/04/2013 0.9 0.5 - 1.4 mg/dL Final     Calcium   Date Value Ref Range Status   03/16/2022 8.1 (L) 8.7 - 10.5 mg/dL Final   06/04/2013  9.7 8.7 - 10.5 mg/dL Final     Total Protein   Date Value Ref Range Status   03/16/2022 7.0 6.0 - 8.4 g/dL Final     Albumin   Date Value Ref Range Status   03/16/2022 3.4 (L) 3.5 - 5.2 g/dL Final     Total Bilirubin   Date Value Ref Range Status   03/16/2022 0.4 0.1 - 1.0 mg/dL Final     Comment:     For infants and newborns, interpretation of results should be based  on gestational age, weight and in agreement with clinical  observations.    Premature Infant recommended reference ranges:  Up to 24 hours.............<8.0 mg/dL  Up to 48 hours............<12.0 mg/dL  3-5 days..................<15.0 mg/dL  6-29 days.................<15.0 mg/dL       Alkaline Phosphatase   Date Value Ref Range Status   03/16/2022 39 (L) 55 - 135 U/L Final     AST   Date Value Ref Range Status   03/16/2022 19 10 - 40 U/L Final     ALT   Date Value Ref Range Status   03/16/2022 21 10 - 44 U/L Final     Anion Gap   Date Value Ref Range Status   03/16/2022 11 8 - 16 mmol/L Final   06/04/2013 9 5 - 15 meq/L Final     eGFR if    Date Value Ref Range Status   03/16/2022 56 (A) >60 mL/min/1.73 m^2 Final     eGFR if non    Date Value Ref Range Status   03/16/2022 48 (A) >60 mL/min/1.73 m^2 Final     Comment:     Calculation used to obtain the estimated glomerular filtration  rate (eGFR) is the CKD-EPI equation.        Lab Results   Component Value Date    AMYLASE 106 02/22/2016     Lab Results   Component Value Date    LIPASE 27 06/20/2020     Lab Results   Component Value Date    TSH <0.010 (L) 03/16/2022       Reviewed prior medical records including radiology report of abdominal CT 03/16/2022 & endoscopy history of colonoscopy 11/16/2020, VCE 06/04/2020 EGD 05/20/2020, & colonoscopy 05/20/2020 (see surgical history).    Objective:      Physical Exam  Vitals and nursing note reviewed.   Constitutional:       General: She is not in acute distress.     Appearance: Normal appearance. She is normal weight. She is  not ill-appearing.   HENT:      Mouth/Throat:      Comments: Unable to assess due to COVID-19 concerns.  Eyes:      Extraocular Movements: Extraocular movements intact.      Pupils: Pupils are equal, round, and reactive to light.   Cardiovascular:      Rate and Rhythm: Normal rate and regular rhythm.   Pulmonary:      Effort: Pulmonary effort is normal. No respiratory distress.      Breath sounds: Normal breath sounds.   Abdominal:      General: Abdomen is flat. Bowel sounds are normal. There is no distension or abdominal bruit. There are no signs of injury.      Palpations: There is no shifting dullness, fluid wave, hepatomegaly or mass.      Tenderness: There is no abdominal tenderness. There is no guarding or rebound. Negative signs include Rashid's sign and McBurney's sign.      Hernia: No hernia is present.   Skin:     General: Skin is warm and dry.      Coloration: Skin is not jaundiced.   Neurological:      Mental Status: She is alert and oriented to person, place, and time.   Psychiatric:         Mood and Affect: Mood normal.         Behavior: Behavior normal.         Assessment:       1. Nausea    2. Epigastric pain    3. Gastroesophageal reflux disease, unspecified whether esophagitis present    4. History of gastritis    5. Hiatal hernia    6. Lower abdominal pain    7. Rectal prolapse    8. Change in stool caliber    9. History of constipation        Plan:       Nausea  - schedule EGD, discussed procedure with patient, including risks and benefits, patient verbalized understanding  - START: ondansetron (ZOFRAN) 4 MG tablet; Take 1 tablet (4 mg total) by mouth every 12 (twelve) hours as needed for Nausea.  Dispense: 30 tablet; Refill: 0    Epigastric pain  - schedule EGD, discussed procedure with patient, including risks and benefits, patient verbalized understanding    Gastroesophageal reflux disease, unspecified whether esophagitis present  - schedule EGD, discussed procedure with patient, including  risks and benefits, patient verbalized understanding  -Educated patient on lifestyle modifications to help control/reduce reflux/abdominal pain including: avoid large meals, avoid eating within 2-3 hours of bedtime (avoid late night eating & lying down soon after eating), elevate head of bed if nocturnal symptoms are present, smoking cessation (if current smoker), & weight loss (if overweight).   -Educated to avoid known foods which trigger reflux symptoms & to minimize/avoid high-fat foods, chocolate, caffeine, citrus, alcohol, & tomato products.  -Advised to avoid/limit use of NSAID's, since they can cause GI upset, bleeding, and/or ulcers. If needed, take with food.     History of gastritis  - schedule EGD, discussed procedure with patient, including risks and benefits, patient verbalized understanding    Hiatal hernia   - schedule EGD, discussed procedure with patient, including risks and benefits, patient verbalized understanding  - usually managed by controlling reflux symptoms, surgery is an option, but usually performed if reflux is uncontrolled by medication management and lifestyle/dietary modifications; if symptoms persist despite medication management and lifestyle/dietary modifications, we can refer to general surgery to consult about surgical options, patient verbalized understanding    Rectal prolapse  -Follow-up with Dr. Patel for further evaluation and management    Change in stool caliber  -Consider colonoscopy pending results    History of constipation   -Recommend daily exercise as tolerated, adequate water intake (six 8-oz glasses of water daily), and high fiber diet. OTC fiber supplements are recommended if diet does not reach daily fiber goal (20-30 grams daily), such as Metamucil, Citrucel, or FiberCon (take as directed, separate from other oral medications by >2 hours).  -Recommend taking an OTC stool softener such as Colace as directed to avoid hard stools and straining with bowel movements  PRN  -CONTINUE: OTC MiraLax once daily (17g PO) as directed  - If no improvement with above recommendations, try intermittently dosed Dulcolax OTC as directed (every 3-4  days) PRN to facilitate bowel movements  -If still no improvement with these measures, call/follow-up    Follow up in about 4 weeks (around 4/13/2022), or if symptoms worsen or fail to improve.      If no improvement in symptoms or symptoms worsen, call/follow-up at clinic or go to ER.        30 minutes of total time spent on the encounter, which includes face to face time and non-face to face time preparing to see the patient (eg, review of tests), Obtaining and/or reviewing separately obtained history, Documenting clinical information in the electronic or other health record, Independently interpreting results (not separately reported) and communicating results to the patient/family/caregiver, or Care coordination (not separately reported).

## 2022-03-16 NOTE — TELEPHONE ENCOUNTER
I called patient and rescheduled her sooner to see Dr. Patel on Tuesday, 3/29/22 @ 1:30pm in [facility] [place]. Zhang

## 2022-03-16 NOTE — TELEPHONE ENCOUNTER
----- Message from Jud Bennett sent at 3/16/2022  8:23 AM CDT -----  Contact: TODD RODRIGUEZ [[id number]]  Type: Patient Call Back    Who called:TODD RODRIGUEZ [[id number]]    What is the request in detail: The patient would like to schedule a appt for a Prolapsed rectum    Can the clinic reply by [facility]?    Would the patient rather a call back or a response via [facility]?     Best call back number: [phone] (mobile)    Additional Information:

## 2022-03-16 NOTE — H&P (VIEW-ONLY)
"Subjective:       Patient ID: Erica Masterson is a 76 y.o. female Body mass index is 24.09 kg/m².    Chief Complaint: Nausea    This patient is new to me.  Established patient of Dr. Britton.     Reviewed hospital discharge summary report from 03/16/2022,  "Medical Decision Making: ED Management:  76-year-old female presents with lower abdominal pain, 3 week history of narrow caliber stools and rectal prolapse.  She has a recent colonoscopy with colon cancer less likely.  She is referred to colorectal surgery for evaluation of her rectal prolapse.  CT of the abdomen and pelvis fails to demonstrate any acute findings."    GI Problem  The primary symptoms include abdominal pain (reports intermittent epigastric and lower abdominal pain and vibrating sensation at night when lying down) and nausea. Primary symptoms do not include fever, weight loss, fatigue, vomiting, diarrhea, melena, hematemesis, jaundice, hematochezia, dysuria or rash.   The abdominal pain began more than 2 days ago. The abdominal pain has been gradually worsening since its onset. The abdominal pain is located in the epigastric region, LLQ and RLQ. The abdominal pain does not radiate. The abdominal pain is relieved by nothing.   Nausea began more than 1 week ago (started months ago). The nausea is associated with eating.   The illness is also significant for constipation (reports a change in stool caliber ~3 weeks ago - pencil thin; denies constipation currently - having 2-3 bowel movements daily using Miralax; denies current linzeses use; denies straining, rectal pain, rectal bleeding, or black/tarry stools). The illness does not include chills, anorexia, dysphagia, odynophagia or bloating. Significant associated medical issues include GERD. Associated medical issues do not include inflammatory bowel disease, gallstones, liver disease, alcohol abuse, PUD, gastric bypass, bowel resection, irritable bowel syndrome, hemorrhoids or diverticulitis. "     Review of Systems   Constitutional: Negative for activity change, appetite change, chills, diaphoresis, fatigue, fever, unexpected weight change and weight loss.   HENT: Negative for sore throat and trouble swallowing.    Respiratory: Negative for cough, choking and shortness of breath.    Cardiovascular: Negative for chest pain.   Gastrointestinal: Positive for abdominal pain (reports intermittent epigastric and lower abdominal pain and vibrating sensation at night when lying down), constipation (reports a change in stool caliber ~3 weeks ago - pencil thin; denies constipation currently - having 2-3 bowel movements daily using Miralax; denies current linzeses use; denies straining, rectal pain, rectal bleeding, or black/tarry stools) and nausea. Negative for abdominal distention, anal bleeding, anorexia, bloating, blood in stool, diarrhea, dysphagia, hematemesis, hematochezia, jaundice, melena, rectal pain (history of rectal prolapse) and vomiting.   Genitourinary: Negative for dysuria.   Skin: Negative for rash.       No LMP recorded. Patient has had a hysterectomy.  Past Medical History:   Diagnosis Date    Allergy     Anxiety     Cataract     Colon polyp     Degenerative arthritis of knee 4/3/2012    Diverticulosis     GERD (gastroesophageal reflux disease)     History of thyroid cancer 2021    Hyperlipidemia     Hypertension     Multinodular goiter 3/26/2012    Myopathy, unspecified 1/18/2010    Tuberculosis      Past Surgical History:   Procedure Laterality Date    BREAST BIOPSY Left 2015    benign    CHOLECYSTECTOMY      COLONOSCOPY  12/2/15    Dr. Britton, multiple polyps, recheck five years-three years if more than 2 polyps are adenomatous    COLONOSCOPY N/A 12/2/2015    COLONOSCOPY N/A 3/20/2019    Procedure: COLONOSCOPY;  Surgeon: Jose Britton MD;  Location: [facility];  Service: Endoscopy;  Laterality: N/A;    COLONOSCOPY N/A 5/20/2020    Dr. Britton; internal hemorrhoids;  diverticulosis; polyps removed; repeat in 3 years    CYSTOSCOPY N/A 8/26/2020    Procedure: CYSTOSCOPY;  Surgeon: Charlotte Walters Jr., MD;  Location: [facility];  Service: Urology;  Laterality: N/A;    DISSECTION OF NECK Left 9/13/2021    Procedure: DISSECTION, NECK;  Surgeon: Ryan Torres MD;  [facility];  Service: ENT;  Laterality: Left;    ESOPHAGOGASTRODUODENOSCOPY N/A 5/20/2020    Dr. Britton; small hiatal hernia; gastritis; 8 gastric polyps removed    FOOT SURGERY      HYSTERECTOMY      TVH/BSO > 20 years    INTRALUMINAL GASTROINTESTINAL TRACT IMAGING VIA CAPSULE N/A 6/4/2020    Procedure: IMAGING PROCEDURE, GI TRACT, INTRALUMINAL, VIA CAPSULE;  Surgeon: Jose Britton MD;  Location: [facility];  Service: Endoscopy;  Laterality: N/A;    KNEE SURGERY  06/06/2013    right knee tear Dr Iverson     LAPAROSCOPIC CHOLECYSTECTOMY N/A 9/17/2020    Procedure: CHOLECYSTECTOMY, LAPAROSCOPIC;  Surgeon: Forrest Veronica MD;  Location: [facility];  Service: General;  Laterality: N/A;    LUNG LOBECTOMY  1966    right middle lobectomy, due to Tb    OOPHORECTOMY      SHOULDER SURGERY      francis     THYROIDECTOMY Bilateral 5/19/2021    Procedure: THYROIDECTOMY;  Surgeon: Jamia Amezquita MD;  Location: [facility] OR [address] [place] [address];  Service: General;  Laterality: Bilateral;    THYROIDECTOMY Bilateral 9/13/2021    Procedure: THYROIDECTOMY WITH INTRA-OP PTH;  Surgeon: Ryan Torres MD;  Location: [facility] OR [address] [place] [address];  Service: ENT;  Laterality: Bilateral;  NIM tube  Intraop PTH     Family History   Problem Relation Age of Onset    Colon cancer Other     Cancer Mother     Hypertension Mother     Hyperlipidemia Mother     Cancer Brother     Hypertension Father     Emphysema Father     Diabetes Son     Hypertension Son     Alcohol abuse Son     Diabetes Maternal Aunt     Alzheimer's disease Maternal Uncle     Cancer Maternal Grandmother     No Known Problems Daughter     Dementia Sister      Alzheimer's disease Sister     Breast cancer Sister 60    Obesity Paternal Uncle     Prostate cancer Other     Melanoma Neg Hx     Psoriasis Neg Hx     Lupus Neg Hx     Eczema Neg Hx     Amblyopia Neg Hx     Blindness Neg Hx     Cataracts Neg Hx     Glaucoma Neg Hx     Macular degeneration Neg Hx     Retinal detachment Neg Hx     Strabismus Neg Hx     Stroke Neg Hx     Thyroid cancer Neg Hx     Colon polyps Neg Hx     Ulcerative colitis Neg Hx     Stomach cancer Neg Hx     Rectal cancer Neg Hx      Social History     Tobacco Use    Smoking status: Never Smoker    Smokeless tobacco: Never Used   Substance Use Topics    Alcohol use: Not Currently     Comment: stopped 2019    Drug use: No     Wt Readings from Last 10 Encounters:   03/16/22 67.7 kg (149 lb 4 oz)   03/16/22 67.6 kg (149 lb)   03/15/22 67.9 kg (149 lb 11.1 oz)   03/03/22 66.7 kg (147 lb)   02/18/22 67.1 kg (147 lb 14.9 oz)   02/15/22 67 kg (147 lb 13.1 oz)   02/10/22 67.6 kg (149 lb 0.5 oz)   02/03/22 66.7 kg (147 lb)   01/24/22 67.1 kg (147 lb 14.9 oz)   01/20/22 68 kg (150 lb)     Lab Results   Component Value Date    WBC 6.90 03/16/2022    HGB 11.4 (L) 03/16/2022    HCT 36.3 (L) 03/16/2022    MCV 90 03/16/2022     03/16/2022     CMP  Sodium   Date Value Ref Range Status   03/16/2022 142 136 - 145 mmol/L Final     Potassium   Date Value Ref Range Status   03/16/2022 4.6 3.5 - 5.1 mmol/L Final     Chloride   Date Value Ref Range Status   03/16/2022 105 95 - 110 mmol/L Final     CO2   Date Value Ref Range Status   03/16/2022 26 23 - 29 mmol/L Final     Glucose   Date Value Ref Range Status   03/16/2022 120 (H) 70 - 110 mg/dL Final     BUN   Date Value Ref Range Status   03/16/2022 26 (H) 8 - 23 mg/dL Final     Creatinine   Date Value Ref Range Status   03/16/2022 1.1 0.5 - 1.4 mg/dL Final   06/04/2013 0.9 0.5 - 1.4 mg/dL Final     Calcium   Date Value Ref Range Status   03/16/2022 8.1 (L) 8.7 - 10.5 mg/dL Final   06/04/2013  9.7 8.7 - 10.5 mg/dL Final     Total Protein   Date Value Ref Range Status   03/16/2022 7.0 6.0 - 8.4 g/dL Final     Albumin   Date Value Ref Range Status   03/16/2022 3.4 (L) 3.5 - 5.2 g/dL Final     Total Bilirubin   Date Value Ref Range Status   03/16/2022 0.4 0.1 - 1.0 mg/dL Final     Comment:     For infants and newborns, interpretation of results should be based  on gestational age, weight and in agreement with clinical  observations.    Premature Infant recommended reference ranges:  Up to 24 hours.............<8.0 mg/dL  Up to 48 hours............<12.0 mg/dL  3-5 days..................<15.0 mg/dL  6-29 days.................<15.0 mg/dL       Alkaline Phosphatase   Date Value Ref Range Status   03/16/2022 39 (L) 55 - 135 U/L Final     AST   Date Value Ref Range Status   03/16/2022 19 10 - 40 U/L Final     ALT   Date Value Ref Range Status   03/16/2022 21 10 - 44 U/L Final     Anion Gap   Date Value Ref Range Status   03/16/2022 11 8 - 16 mmol/L Final   06/04/2013 9 5 - 15 meq/L Final     eGFR if    Date Value Ref Range Status   03/16/2022 56 (A) >60 mL/min/1.73 m^2 Final     eGFR if non    Date Value Ref Range Status   03/16/2022 48 (A) >60 mL/min/1.73 m^2 Final     Comment:     Calculation used to obtain the estimated glomerular filtration  rate (eGFR) is the CKD-EPI equation.        Lab Results   Component Value Date    AMYLASE 106 02/22/2016     Lab Results   Component Value Date    LIPASE 27 06/20/2020     Lab Results   Component Value Date    TSH <0.010 (L) 03/16/2022       Reviewed prior medical records including radiology report of abdominal CT 03/16/2022 & endoscopy history of colonoscopy 11/16/2020, VCE 06/04/2020 EGD 05/20/2020, & colonoscopy 05/20/2020 (see surgical history).    Objective:      Physical Exam  Vitals and nursing note reviewed.   Constitutional:       General: She is not in acute distress.     Appearance: Normal appearance. She is normal weight. She is  not ill-appearing.   HENT:      Mouth/Throat:      Comments: Unable to assess due to COVID-19 concerns.  Eyes:      Extraocular Movements: Extraocular movements intact.      Pupils: Pupils are equal, round, and reactive to light.   Cardiovascular:      Rate and Rhythm: Normal rate and regular rhythm.   Pulmonary:      Effort: Pulmonary effort is normal. No respiratory distress.      Breath sounds: Normal breath sounds.   Abdominal:      General: Abdomen is flat. Bowel sounds are normal. There is no distension or abdominal bruit. There are no signs of injury.      Palpations: There is no shifting dullness, fluid wave, hepatomegaly or mass.      Tenderness: There is no abdominal tenderness. There is no guarding or rebound. Negative signs include Rashid's sign and McBurney's sign.      Hernia: No hernia is present.   Skin:     General: Skin is warm and dry.      Coloration: Skin is not jaundiced.   Neurological:      Mental Status: She is alert and oriented to person, place, and time.   Psychiatric:         Mood and Affect: Mood normal.         Behavior: Behavior normal.         Assessment:       1. Nausea    2. Epigastric pain    3. Gastroesophageal reflux disease, unspecified whether esophagitis present    4. History of gastritis    5. Hiatal hernia    6. Lower abdominal pain    7. Rectal prolapse    8. Change in stool caliber    9. History of constipation        Plan:       Nausea  - schedule EGD, discussed procedure with patient, including risks and benefits, patient verbalized understanding  - START: ondansetron (ZOFRAN) 4 MG tablet; Take 1 tablet (4 mg total) by mouth every 12 (twelve) hours as needed for Nausea.  Dispense: 30 tablet; Refill: 0    Epigastric pain  - schedule EGD, discussed procedure with patient, including risks and benefits, patient verbalized understanding    Gastroesophageal reflux disease, unspecified whether esophagitis present  - schedule EGD, discussed procedure with patient, including  risks and benefits, patient verbalized understanding  -Educated patient on lifestyle modifications to help control/reduce reflux/abdominal pain including: avoid large meals, avoid eating within 2-3 hours of bedtime (avoid late night eating & lying down soon after eating), elevate head of bed if nocturnal symptoms are present, smoking cessation (if current smoker), & weight loss (if overweight).   -Educated to avoid known foods which trigger reflux symptoms & to minimize/avoid high-fat foods, chocolate, caffeine, citrus, alcohol, & tomato products.  -Advised to avoid/limit use of NSAID's, since they can cause GI upset, bleeding, and/or ulcers. If needed, take with food.     History of gastritis  - schedule EGD, discussed procedure with patient, including risks and benefits, patient verbalized understanding    Hiatal hernia   - schedule EGD, discussed procedure with patient, including risks and benefits, patient verbalized understanding  - usually managed by controlling reflux symptoms, surgery is an option, but usually performed if reflux is uncontrolled by medication management and lifestyle/dietary modifications; if symptoms persist despite medication management and lifestyle/dietary modifications, we can refer to general surgery to consult about surgical options, patient verbalized understanding    Rectal prolapse  -Follow-up with Dr. Patel for further evaluation and management    Change in stool caliber  -Consider colonoscopy pending results    History of constipation   -Recommend daily exercise as tolerated, adequate water intake (six 8-oz glasses of water daily), and high fiber diet. OTC fiber supplements are recommended if diet does not reach daily fiber goal (20-30 grams daily), such as Metamucil, Citrucel, or FiberCon (take as directed, separate from other oral medications by >2 hours).  -Recommend taking an OTC stool softener such as Colace as directed to avoid hard stools and straining with bowel movements  PRN  -CONTINUE: OTC MiraLax once daily (17g PO) as directed  - If no improvement with above recommendations, try intermittently dosed Dulcolax OTC as directed (every 3-4  days) PRN to facilitate bowel movements  -If still no improvement with these measures, call/follow-up    Follow up in about 4 weeks (around 4/13/2022), or if symptoms worsen or fail to improve.      If no improvement in symptoms or symptoms worsen, call/follow-up at clinic or go to ER.        30 minutes of total time spent on the encounter, which includes face to face time and non-face to face time preparing to see the patient (eg, review of tests), Obtaining and/or reviewing separately obtained history, Documenting clinical information in the electronic or other health record, Independently interpreting results (not separately reported) and communicating results to the patient/family/caregiver, or Care coordination (not separately reported).

## 2022-03-16 NOTE — TELEPHONE ENCOUNTER
I called patient to advise her that she doesn't need to see Carmenza Villa PA-C tomorrow since there is no surgical reason.  She's still seeing Deja Mendoza NP for abdominal pain and nausea.  Zhang

## 2022-03-16 NOTE — TELEPHONE ENCOUNTER
----- Message from Dilma Tilley sent at 3/16/2022  8:40 AM CDT -----  Type: Needs Medical Advice  Who Called:  PT  Symptoms (please be specific):  Pt asking for medication for nausea rather than coming in to the office if possible. She had thyroid surgery and needs to be careful with what medication she takes.     Best Call Back Number: [phone]  Additional Information: Please call and advise.

## 2022-03-16 NOTE — TELEPHONE ENCOUNTER
Patient cancelled 930am appointment with NP her car would not start she is rescheduled for 130pm she wants medication for nausea.

## 2022-03-17 LAB
HCV AB SERPL QL IA: NEGATIVE
HIV 1+2 AB+HIV1 P24 AG SERPL QL IA: NEGATIVE

## 2022-03-18 ENCOUNTER — TELEPHONE (OUTPATIENT)
Dept: ORTHOPEDICS | Facility: CLINIC | Age: 77
End: 2022-03-18
Payer: MEDICARE

## 2022-03-18 NOTE — TELEPHONE ENCOUNTER
Called and spoke to patient.  She reports she is doing ok during the day but has some tightness in her shoulder at night.  She reports she is taking tylenol for her pain at night.  I suggested using heat or ice PRN to help her at night and also to discuss this with her physical therapist at her appointment on Monday.      Asa

## 2022-03-18 NOTE — TELEPHONE ENCOUNTER
----- Message from Julián Almanza sent at 3/18/2022  8:15 AM CDT -----  Regarding: pt is in severe pain and wants to be seen today, call pt   Contact: pt   pt is in severe pain and wants to be seen today, call pt

## 2022-03-21 ENCOUNTER — TELEPHONE (OUTPATIENT)
Dept: SURGERY | Facility: CLINIC | Age: 77
End: 2022-03-21
Payer: MEDICARE

## 2022-03-21 ENCOUNTER — HOSPITAL ENCOUNTER (OUTPATIENT)
Facility: HOSPITAL | Age: 77
Discharge: HOME | End: 2022-03-23
Attending: EMERGENCY MEDICINE | Admitting: INTERNAL MEDICINE
Payer: MEDICARE

## 2022-03-21 DIAGNOSIS — R79.89 ELEVATED D-DIMER: ICD-10-CM

## 2022-03-21 DIAGNOSIS — R10.9 ABDOMINAL PAIN: ICD-10-CM

## 2022-03-21 DIAGNOSIS — R07.9 CHEST PAIN: ICD-10-CM

## 2022-03-21 DIAGNOSIS — R29.898 DECREASED STRENGTH OF UPPER EXTREMITY: ICD-10-CM

## 2022-03-21 DIAGNOSIS — G52.8 SPINAL ACCESSORY NERVE DISORDER: ICD-10-CM

## 2022-03-21 DIAGNOSIS — R07.9 CHEST PAIN, UNSPECIFIED TYPE: Primary | ICD-10-CM

## 2022-03-21 DIAGNOSIS — M85.88 OSTEOPENIA OF LUMBAR SPINE: ICD-10-CM

## 2022-03-21 LAB
ALBUMIN SERPL BCP-MCNC: 4 G/DL (ref 3.5–5.2)
ALP SERPL-CCNC: 38 U/L (ref 55–135)
ALT SERPL W/O P-5'-P-CCNC: 21 U/L (ref 10–44)
AMPHET+METHAMPHET UR QL: NEGATIVE
ANION GAP SERPL CALC-SCNC: 10 MMOL/L (ref 8–16)
APTT PPP: 31.8 SEC (ref 23.3–35.1)
AST SERPL-CCNC: 18 U/L (ref 10–40)
BACTERIA #/AREA URNS HPF: NEGATIVE /HPF
BARBITURATES UR QL SCN>200 NG/ML: NEGATIVE
BASOPHILS # BLD AUTO: 0.03 K/UL (ref 0–0.2)
BASOPHILS NFR BLD: 0.6 % (ref 0–1.9)
BENZODIAZ UR QL SCN>200 NG/ML: NEGATIVE
BILIRUB SERPL-MCNC: 0.7 MG/DL (ref 0.1–1)
BILIRUB UR QL STRIP: NEGATIVE
BNP SERPL-MCNC: 29 PG/ML (ref 0–99)
BUN SERPL-MCNC: 21 MG/DL (ref 8–23)
BZE UR QL SCN: NEGATIVE
CA-I BLDV-SCNC: 1.11 MMOL/L (ref 1.06–1.42)
CALCIUM SERPL-MCNC: 8.2 MG/DL (ref 8.7–10.5)
CANNABINOIDS UR QL SCN: NEGATIVE
CHLORIDE SERPL-SCNC: 102 MMOL/L (ref 95–110)
CK SERPL-CCNC: 124 U/L (ref 20–180)
CLARITY UR: CLEAR
CO2 SERPL-SCNC: 28 MMOL/L (ref 23–29)
COLOR UR: YELLOW
CREAT SERPL-MCNC: 0.9 MG/DL (ref 0.5–1.4)
CREAT UR-MCNC: 46 MG/DL (ref 15–325)
D DIMER PPP IA.FEU-MCNC: 3 UG/ML FEU
DIFFERENTIAL METHOD: ABNORMAL
EOSINOPHIL # BLD AUTO: 0 K/UL (ref 0–0.5)
EOSINOPHIL NFR BLD: 0.4 % (ref 0–8)
ERYTHROCYTE [DISTWIDTH] IN BLOOD BY AUTOMATED COUNT: 13.7 % (ref 11.5–14.5)
EST. GFR  (AFRICAN AMERICAN): >60 ML/MIN/1.73 M^2
EST. GFR  (NON AFRICAN AMERICAN): >60 ML/MIN/1.73 M^2
GLUCOSE SERPL-MCNC: 107 MG/DL (ref 70–110)
GLUCOSE SERPL-MCNC: 121 MG/DL (ref 70–110)
GLUCOSE UR QL STRIP: NEGATIVE
HCT VFR BLD AUTO: 37.7 % (ref 37–48.5)
HGB BLD-MCNC: 11.7 G/DL (ref 12–16)
HGB UR QL STRIP: ABNORMAL
HYALINE CASTS #/AREA URNS LPF: 4 /LPF
IMM GRANULOCYTES # BLD AUTO: 0.01 K/UL (ref 0–0.04)
IMM GRANULOCYTES NFR BLD AUTO: 0.2 % (ref 0–0.5)
INR PPP: 1.1
KETONES UR QL STRIP: NEGATIVE
LEUKOCYTE ESTERASE UR QL STRIP: NEGATIVE
LIPASE SERPL-CCNC: 38 U/L (ref 4–60)
LYMPHOCYTES # BLD AUTO: 0.9 K/UL (ref 1–4.8)
LYMPHOCYTES NFR BLD: 17.1 % (ref 18–48)
MAGNESIUM SERPL-MCNC: 1.9 MG/DL (ref 1.6–2.6)
MCH RBC QN AUTO: 27.7 PG (ref 27–31)
MCHC RBC AUTO-ENTMCNC: 31 G/DL (ref 32–36)
MCV RBC AUTO: 89 FL (ref 82–98)
MICROSCOPIC COMMENT: ABNORMAL
MONOCYTES # BLD AUTO: 0.4 K/UL (ref 0.3–1)
MONOCYTES NFR BLD: 7.1 % (ref 4–15)
NEUTROPHILS # BLD AUTO: 3.8 K/UL (ref 1.8–7.7)
NEUTROPHILS NFR BLD: 74.6 % (ref 38–73)
NITRITE UR QL STRIP: NEGATIVE
NRBC BLD-RTO: 0 /100 WBC
OPIATES UR QL SCN: NEGATIVE
PCP UR QL SCN>25 NG/ML: NEGATIVE
PH UR STRIP: 5 [PH] (ref 5–8)
PLATELET # BLD AUTO: 231 K/UL (ref 150–450)
PMV BLD AUTO: 8.6 FL (ref 9.2–12.9)
POTASSIUM SERPL-SCNC: 3.6 MMOL/L (ref 3.5–5.1)
PROT SERPL-MCNC: 7.3 G/DL (ref 6–8.4)
PROT UR QL STRIP: NEGATIVE
PROTHROMBIN TIME: 13.1 SEC (ref 11.4–13.7)
RBC # BLD AUTO: 4.22 M/UL (ref 4–5.4)
RBC #/AREA URNS HPF: 2 /HPF (ref 0–4)
SARS-COV-2 RDRP RESP QL NAA+PROBE: NEGATIVE
SODIUM SERPL-SCNC: 140 MMOL/L (ref 136–145)
SP GR UR STRIP: 1.01 (ref 1–1.03)
SQUAMOUS #/AREA URNS HPF: 4 /HPF
T4 FREE SERPL-MCNC: 1.22 NG/DL (ref 0.71–1.51)
TOXICOLOGY INFORMATION: NORMAL
TROPONIN I SERPL DL<=0.01 NG/ML-MCNC: <0.03 NG/ML
TSH SERPL DL<=0.005 MIU/L-ACNC: 0.01 UIU/ML (ref 0.34–5.6)
URN SPEC COLLECT METH UR: ABNORMAL
UROBILINOGEN UR STRIP-ACNC: NEGATIVE EU/DL
WBC # BLD AUTO: 5.08 K/UL (ref 3.9–12.7)
WBC #/AREA URNS HPF: 1 /HPF (ref 0–5)

## 2022-03-21 PROCEDURE — 80053 COMPREHEN METABOLIC PANEL: CPT | Performed by: EMERGENCY MEDICINE

## 2022-03-21 PROCEDURE — G0378 HOSPITAL OBSERVATION PER HR: HCPCS

## 2022-03-21 PROCEDURE — 84443 ASSAY THYROID STIM HORMONE: CPT | Performed by: EMERGENCY MEDICINE

## 2022-03-21 PROCEDURE — 83880 ASSAY OF NATRIURETIC PEPTIDE: CPT | Performed by: EMERGENCY MEDICINE

## 2022-03-21 PROCEDURE — 99285 EMERGENCY DEPT VISIT HI MDM: CPT | Mod: 25

## 2022-03-21 PROCEDURE — C9113 INJ PANTOPRAZOLE SODIUM, VIA: HCPCS | Performed by: INTERNAL MEDICINE

## 2022-03-21 PROCEDURE — 63600175 PHARM REV CODE 636 W HCPCS: Performed by: INTERNAL MEDICINE

## 2022-03-21 PROCEDURE — 85025 COMPLETE CBC W/AUTO DIFF WBC: CPT | Performed by: EMERGENCY MEDICINE

## 2022-03-21 PROCEDURE — 82550 ASSAY OF CK (CPK): CPT | Performed by: EMERGENCY MEDICINE

## 2022-03-21 PROCEDURE — 84484 ASSAY OF TROPONIN QUANT: CPT | Mod: 91 | Performed by: INTERNAL MEDICINE

## 2022-03-21 PROCEDURE — 87040 BLOOD CULTURE FOR BACTERIA: CPT | Performed by: INTERNAL MEDICINE

## 2022-03-21 PROCEDURE — 85610 PROTHROMBIN TIME: CPT | Performed by: EMERGENCY MEDICINE

## 2022-03-21 PROCEDURE — 84484 ASSAY OF TROPONIN QUANT: CPT | Performed by: EMERGENCY MEDICINE

## 2022-03-21 PROCEDURE — 84439 ASSAY OF FREE THYROXINE: CPT | Performed by: EMERGENCY MEDICINE

## 2022-03-21 PROCEDURE — 82330 ASSAY OF CALCIUM: CPT | Performed by: INTERNAL MEDICINE

## 2022-03-21 PROCEDURE — 36415 COLL VENOUS BLD VENIPUNCTURE: CPT | Performed by: INTERNAL MEDICINE

## 2022-03-21 PROCEDURE — 83735 ASSAY OF MAGNESIUM: CPT | Performed by: EMERGENCY MEDICINE

## 2022-03-21 PROCEDURE — 81001 URINALYSIS AUTO W/SCOPE: CPT | Mod: 59 | Performed by: EMERGENCY MEDICINE

## 2022-03-21 PROCEDURE — 96372 THER/PROPH/DIAG INJ SC/IM: CPT | Mod: 59

## 2022-03-21 PROCEDURE — 99900031 HC PATIENT EDUCATION (STAT)

## 2022-03-21 PROCEDURE — 85379 FIBRIN DEGRADATION QUANT: CPT | Performed by: EMERGENCY MEDICINE

## 2022-03-21 PROCEDURE — 96374 THER/PROPH/DIAG INJ IV PUSH: CPT

## 2022-03-21 PROCEDURE — 85730 THROMBOPLASTIN TIME PARTIAL: CPT | Performed by: EMERGENCY MEDICINE

## 2022-03-21 PROCEDURE — 80307 DRUG TEST PRSMV CHEM ANLYZR: CPT | Performed by: EMERGENCY MEDICINE

## 2022-03-21 PROCEDURE — 93010 EKG 12-LEAD: ICD-10-PCS | Mod: ,,, | Performed by: INTERNAL MEDICINE

## 2022-03-21 PROCEDURE — 84481 FREE ASSAY (FT-3): CPT | Mod: 91 | Performed by: INTERNAL MEDICINE

## 2022-03-21 PROCEDURE — 25000003 PHARM REV CODE 250: Performed by: EMERGENCY MEDICINE

## 2022-03-21 PROCEDURE — 93010 ELECTROCARDIOGRAM REPORT: CPT | Mod: ,,, | Performed by: INTERNAL MEDICINE

## 2022-03-21 PROCEDURE — 94799 UNLISTED PULMONARY SVC/PX: CPT

## 2022-03-21 PROCEDURE — 25000003 PHARM REV CODE 250: Performed by: INTERNAL MEDICINE

## 2022-03-21 PROCEDURE — U0002 COVID-19 LAB TEST NON-CDC: HCPCS | Performed by: INTERNAL MEDICINE

## 2022-03-21 PROCEDURE — 93005 ELECTROCARDIOGRAM TRACING: CPT | Performed by: INTERNAL MEDICINE

## 2022-03-21 PROCEDURE — 99900035 HC TECH TIME PER 15 MIN (STAT)

## 2022-03-21 PROCEDURE — 83690 ASSAY OF LIPASE: CPT | Performed by: EMERGENCY MEDICINE

## 2022-03-21 PROCEDURE — 25500020 PHARM REV CODE 255: Performed by: EMERGENCY MEDICINE

## 2022-03-21 RX ORDER — AMLODIPINE BESYLATE 5 MG/1
10 TABLET ORAL DAILY
Status: DISCONTINUED | OUTPATIENT
Start: 2022-03-21 | End: 2022-03-23 | Stop reason: HOSPADM

## 2022-03-21 RX ORDER — CALCIUM CARBONATE 200(500)MG
500 TABLET,CHEWABLE ORAL
Status: COMPLETED | OUTPATIENT
Start: 2022-03-21 | End: 2022-03-21

## 2022-03-21 RX ORDER — SIMETHICONE 80 MG
1 TABLET,CHEWABLE ORAL ONCE
Status: COMPLETED | OUTPATIENT
Start: 2022-03-21 | End: 2022-03-21

## 2022-03-21 RX ORDER — PRAVASTATIN SODIUM 40 MG/1
80 TABLET ORAL NIGHTLY
Status: DISCONTINUED | OUTPATIENT
Start: 2022-03-21 | End: 2022-03-23 | Stop reason: HOSPADM

## 2022-03-21 RX ORDER — SODIUM CHLORIDE 0.9 % (FLUSH) 0.9 %
2 SYRINGE (ML) INJECTION EVERY 6 HOURS PRN
Status: DISCONTINUED | OUTPATIENT
Start: 2022-03-21 | End: 2022-03-23 | Stop reason: HOSPADM

## 2022-03-21 RX ORDER — GABAPENTIN 300 MG/1
300 CAPSULE ORAL 3 TIMES DAILY
Status: DISCONTINUED | OUTPATIENT
Start: 2022-03-21 | End: 2022-03-23 | Stop reason: HOSPADM

## 2022-03-21 RX ORDER — SIMETHICONE 80 MG
2 TABLET,CHEWABLE ORAL 3 TIMES DAILY PRN
Status: DISCONTINUED | OUTPATIENT
Start: 2022-03-21 | End: 2022-03-23 | Stop reason: HOSPADM

## 2022-03-21 RX ORDER — LEVOTHYROXINE SODIUM 112 UG/1
112 TABLET ORAL
Status: DISCONTINUED | OUTPATIENT
Start: 2022-03-22 | End: 2022-03-23 | Stop reason: HOSPADM

## 2022-03-21 RX ORDER — PANTOPRAZOLE SODIUM 40 MG/10ML
40 INJECTION, POWDER, LYOPHILIZED, FOR SOLUTION INTRAVENOUS DAILY
Status: DISCONTINUED | OUTPATIENT
Start: 2022-03-21 | End: 2022-03-23 | Stop reason: HOSPADM

## 2022-03-21 RX ORDER — ASPIRIN 325 MG
325 TABLET ORAL
Status: COMPLETED | OUTPATIENT
Start: 2022-03-21 | End: 2022-03-21

## 2022-03-21 RX ORDER — ENOXAPARIN SODIUM 100 MG/ML
40 INJECTION SUBCUTANEOUS EVERY 24 HOURS
Status: DISCONTINUED | OUTPATIENT
Start: 2022-03-21 | End: 2022-03-23 | Stop reason: HOSPADM

## 2022-03-21 RX ADMIN — GABAPENTIN 300 MG: 300 CAPSULE ORAL at 08:03

## 2022-03-21 RX ADMIN — CALCIUM CARBONATE (ANTACID) CHEW TAB 500 MG 500 MG: 500 CHEW TAB at 10:03

## 2022-03-21 RX ADMIN — PRAVASTATIN SODIUM 80 MG: 40 TABLET ORAL at 08:03

## 2022-03-21 RX ADMIN — IOHEXOL 100 ML: 350 INJECTION, SOLUTION INTRAVENOUS at 11:03

## 2022-03-21 RX ADMIN — ENOXAPARIN SODIUM 40 MG: 40 INJECTION SUBCUTANEOUS at 05:03

## 2022-03-21 RX ADMIN — SIMETHICONE 80 MG: 80 TABLET, CHEWABLE ORAL at 05:03

## 2022-03-21 RX ADMIN — ASPIRIN 325 MG ORAL TABLET 325 MG: 325 PILL ORAL at 01:03

## 2022-03-21 RX ADMIN — AMLODIPINE BESYLATE 10 MG: 5 TABLET ORAL at 05:03

## 2022-03-21 RX ADMIN — PANTOPRAZOLE SODIUM 40 MG: 40 INJECTION, POWDER, FOR SOLUTION INTRAVENOUS at 05:03

## 2022-03-21 NOTE — TELEPHONE ENCOUNTER
I called patient and she went to [facility] with pain in the left shoulder and stomach upset.  She needs an appointment for rectal prolapse.  She's already scheduled to see Dr. Patel on Tuesday, 3/29/22 @ 1:30pm @ the [facility].  Zhang

## 2022-03-21 NOTE — TELEPHONE ENCOUNTER
----- Message from Laura Rios sent at 3/21/2022  4:26 PM CDT -----  .Type: Needs Medical Advice  Who Called: Pt  Best Call Back Number: [phone]  Additional Information: Pt called to inform that she is currently in the ER at [facility] and waiting to be admitted to room 1111. Pt stated she's not feeling well.  Please call to discuss with pt  Thanks

## 2022-03-21 NOTE — ED PROVIDER NOTES
Encounter Date: 3/21/2022       History     Chief Complaint   Patient presents with    Abdominal Pain     From LLQ that radiates up to neck x months despite multiple visits to PCP and ER.  (Had CT done at [facility] on Wednesday)     Past Medical History:  Diagnosis Date   Allergy    Anxiety    Cataract    Colon polyp    Degenerative arthritis of knee 4/3/2012   Diverticulosis    GERD (gastroesophageal reflux disease)    History of thyroid cancer 2021   Hyperlipidemia    Hypertension    Multinodular goiter 3/26/2012   Myopathy, unspecified 1/18/2010   Tuberculosis   This is a 76-year-old female with above past medical history who presents with several complaints.  The patient has recently been seen for complaints of a bulging sensation to her rectal area, was diagnosed with a rectal prolapse and has an upcoming appointment with surgery for this.  She denies any current rectal pain or discomfort but has been having some crampy abdominal pain which is slightly worsened normal although she does have a history of abdominal pain and discomfort over the past several months.  She denies gastrointestinal bleeding fever or chills.  The symptoms are intermittent and moderate intensity with no exacerbating or alleviating factors.  She denies abdominal distension.  The patient is also complaining of chest discomfort.  She reports having midsternal chest discomfort radiating up from the epigastric area into the left shoulder and left neck.  She does have some chronic problems with her left shoulder for which she has previously been evaluated by her physician but these symptoms were not entirely classic for her previous problems with her shoulder which prompted her presentation.  The discomfort has been intermittent in nature.  It is unclear if there any exacerbating or alleviating factors.  The symptoms have been going on over the past 2 days.  She has been fatigued and short of breath and she does feel a shortness of  breath is worse with exertion.  She denies any sudden onset of tearing ripping pain or discomfort.  She has not had any severe pain.  She denies coughing congestion or any upper or lower respiratory infectious type symptoms.  She denies any lower extremity pain or swelling.  She denies any urinary problems.  She does report anxiety and fatigue which is a chronic ongoing issue.  She denies any other problems or complaints.    The history is provided by the patient.     Review of patient's allergies indicates:  No Known Allergies  Past Medical History:   Diagnosis Date    Allergy     Anxiety     Cataract     Colon polyp     Degenerative arthritis of knee 4/3/2012    Diverticulosis     GERD (gastroesophageal reflux disease)     History of thyroid cancer 2021    Hyperlipidemia     Hypertension     Multinodular goiter 3/26/2012    Myopathy, unspecified 1/18/2010    Tuberculosis      Past Surgical History:   Procedure Laterality Date    BREAST BIOPSY Left 2015    benign    CHOLECYSTECTOMY      COLONOSCOPY  12/2/15    Dr. Britton, multiple polyps, recheck five years-three years if more than 2 polyps are adenomatous    COLONOSCOPY N/A 12/2/2015    COLONOSCOPY N/A 3/20/2019    Procedure: COLONOSCOPY;  Surgeon: Jose Britton MD;  Location: [facility];  Service: Endoscopy;  Laterality: N/A;    COLONOSCOPY N/A 5/20/2020    Dr. Britton; internal hemorrhoids; diverticulosis; polyps removed; repeat in 3 years    CYSTOSCOPY N/A 8/26/2020    Procedure: CYSTOSCOPY;  Surgeon: Charlotte Walters Jr., MD;  Location: [facility];  Service: Urology;  Laterality: N/A;    DISSECTION OF NECK Left 9/13/2021    Procedure: DISSECTION, NECK;  Surgeon: Ryan Torres MD;  Location: [address];  Service: ENT;  Laterality: Left;    ESOPHAGOGASTRODUODENOSCOPY N/A 5/20/2020    Dr. Britton; small hiatal hernia; gastritis; 8 gastric polyps removed    FOOT SURGERY      HYSTERECTOMY      TVH/BSO > 20 years    INTRALUMINAL  GASTROINTESTINAL TRACT IMAGING VIA CAPSULE N/A 6/4/2020    Procedure: IMAGING PROCEDURE, GI TRACT, INTRALUMINAL, VIA CAPSULE;  Surgeon: Jose Britton MD;  Location: [facility] ENDO;  Service: Endoscopy;  Laterality: N/A;    KNEE SURGERY  06/06/2013    right knee tear Dr Iverson     LAPAROSCOPIC CHOLECYSTECTOMY N/A 9/17/2020    Procedure: CHOLECYSTECTOMY, LAPAROSCOPIC;  Surgeon: Forrest Veronica MD;  Location: [facility] OR;  Service: General;  Laterality: N/A;    LUNG LOBECTOMY  1966    right middle lobectomy, due to Tb    OOPHORECTOMY      SHOULDER SURGERY      francis     THYROIDECTOMY Bilateral 5/19/2021    Procedure: THYROIDECTOMY;  Surgeon: Jamia Amezquita MD;  Location: [facility];  Service: General;  Laterality: Bilateral;    THYROIDECTOMY Bilateral 9/13/2021    Procedure: THYROIDECTOMY WITH INTRA-OP PTH;  Surgeon: Ryan Torres MD;  Location: [facility] OR 2ND FLR;  Service: ENT;  Laterality: Bilateral;  NIM tube  Intraop PTH     Family History   Problem Relation Age of Onset    Colon cancer Other     Cancer Mother     Hypertension Mother     Hyperlipidemia Mother     Cancer Brother     Hypertension Father     Emphysema Father     Diabetes Son     Hypertension Son     Alcohol abuse Son     Diabetes Maternal Aunt     Alzheimer's disease Maternal Uncle     Cancer Maternal Grandmother     No Known Problems Daughter     Dementia Sister     Alzheimer's disease Sister     Breast cancer Sister 60    Obesity Paternal Uncle     Prostate cancer Other     Melanoma Neg Hx     Psoriasis Neg Hx     Lupus Neg Hx     Eczema Neg Hx     Amblyopia Neg Hx     Blindness Neg Hx     Cataracts Neg Hx     Glaucoma Neg Hx     Macular degeneration Neg Hx     Retinal detachment Neg Hx     Strabismus Neg Hx     Stroke Neg Hx     Thyroid cancer Neg Hx     Colon polyps Neg Hx     Ulcerative colitis Neg Hx     Stomach cancer Neg Hx     Rectal cancer Neg Hx      Social History     Tobacco Use     Smoking status: Never Smoker    Smokeless tobacco: Never Used   Substance Use Topics    Alcohol use: Not Currently     Comment: stopped 2019    Drug use: No     Review of Systems   Constitutional: Positive for activity change, appetite change and fatigue. Negative for chills and fever.   HENT: Negative.  Negative for congestion, dental problem, ear pain, rhinorrhea, sinus pressure, sinus pain, sore throat and trouble swallowing.    Eyes: Negative.  Negative for photophobia, pain, redness and visual disturbance.   Respiratory: Positive for shortness of breath. Negative for cough, chest tightness and wheezing.    Cardiovascular: Positive for chest pain. Negative for palpitations and leg swelling.   Gastrointestinal: Positive for abdominal pain and constipation. Negative for abdominal distention, anal bleeding, blood in stool, diarrhea, nausea and vomiting.   Endocrine: Negative.    Genitourinary: Negative.  Negative for decreased urine volume, difficulty urinating, dysuria, flank pain, frequency, hematuria, pelvic pain and urgency.   Musculoskeletal: Positive for myalgias. Negative for arthralgias, back pain, gait problem, joint swelling, neck pain and neck stiffness.   Skin: Negative.  Negative for color change, pallor and rash.   Neurological: Positive for weakness. Negative for dizziness, tremors, seizures, syncope, facial asymmetry, speech difficulty, light-headedness, numbness and headaches.        Nonfocal generalized weakness and fatigue   Hematological: Negative.  Does not bruise/bleed easily.   Psychiatric/Behavioral: Negative.  Negative for confusion.   All other systems reviewed and are negative.      Physical Exam     Initial Vitals [03/21/22 0924]   BP Pulse Resp Temp SpO2   (!) 165/86 106 18 98.4 °F (36.9 °C) 99 %      MAP       --         Physical Exam    Nursing note and vitals reviewed.  Constitutional: She is cooperative. She does not appear ill.   HENT:   Head: Normocephalic and atraumatic.    Right Ear: Tympanic membrane normal.   Left Ear: Tympanic membrane normal.   Nose: Nose normal.   Mouth/Throat: Uvula is midline, oropharynx is clear and moist and mucous membranes are normal. No oral lesions. No uvula swelling. No oropharyngeal exudate, posterior oropharyngeal edema or posterior oropharyngeal erythema.   Eyes: Conjunctivae, EOM and lids are normal. Pupils are equal, round, and reactive to light. Right eye exhibits no discharge. Left eye exhibits no discharge. No scleral icterus.   Neck: Trachea normal and phonation normal. Neck supple. No thyroid mass and no thyromegaly present. No stridor present. No JVD present.   Normal range of motion.   Full passive range of motion without pain.     Cardiovascular: Normal rate, regular rhythm, normal heart sounds, intact distal pulses and normal pulses. Exam reveals no gallop, no distant heart sounds and no friction rub.    No murmur heard.  Pulmonary/Chest: Effort normal and breath sounds normal. No accessory muscle usage or stridor. No tachypnea. No respiratory distress. She has no wheezes. She has no rhonchi. She has no rales.   Abdominal: Abdomen is soft. Bowel sounds are normal. She exhibits no distension, no pulsatile midline mass and no mass. There is generalized abdominal tenderness. No hernia.   Mild diffuse tenderness with no rebound or guarding.   No right CVA tenderness.  No left CVA tenderness. There is no rigidity, no rebound, no guarding, no tenderness at McBurney's point and negative Rashid's sign. negative Rovsing's sign  Musculoskeletal:         General: No tenderness or edema. Normal range of motion.      Right hand: Normal. Normal range of motion. Normal strength. Normal sensation. Normal capillary refill. Normal pulse.      Left hand: Normal. Normal range of motion. Normal strength. Normal sensation. Normal capillary refill. Normal pulse.      Cervical back: Normal, full passive range of motion without pain, normal range of motion and  neck supple. No edema, erythema, rigidity or bony tenderness. No spinous process tenderness or muscular tenderness. Normal range of motion.      Thoracic back: Normal. No bony tenderness. Normal range of motion.      Lumbar back: Normal. No bony tenderness. Normal range of motion.      Right foot: Normal. Normal range of motion and normal capillary refill. No tenderness or bony tenderness. Normal pulse.      Left foot: Normal. Normal range of motion and normal capillary refill. No tenderness or bony tenderness. Normal pulse.      Comments: Pulses are 2+ throughout, cap refill is less than 2 sec throughout, extremities are nontender throughout with full range of motion. There is no spinal tenderness to palpation.     Lymphadenopathy:     She has no cervical adenopathy.   Neurological: She is alert and oriented to person, place, and time. She has normal strength. She displays normal reflexes. No cranial nerve deficit or sensory deficit. Gait normal. GCS score is 15. GCS eye subscore is 4. GCS verbal subscore is 5. GCS motor subscore is 6.   No focal deficits.   Skin: Skin is warm and intact. Capillary refill takes less than 2 seconds. No ecchymosis, no petechiae and no rash noted. No erythema. No pallor.   Psychiatric: She has a normal mood and affect. Her speech is normal. Cognition and memory are normal.         ED Course   Procedures  Labs Reviewed   CBC W/ AUTO DIFFERENTIAL - Abnormal; Notable for the following components:       Result Value    Hemoglobin 11.7 (*)     MCHC 31.0 (*)     MPV 8.6 (*)     Lymph # 0.9 (*)     Gran % 74.6 (*)     Lymph % 17.1 (*)     All other components within normal limits   COMPREHENSIVE METABOLIC PANEL - Abnormal; Notable for the following components:    Calcium 8.2 (*)     Alkaline Phosphatase 38 (*)     All other components within normal limits   TSH - Abnormal; Notable for the following components:    TSH 0.010 (*)     All other components within normal limits   URINALYSIS,  REFLEX TO URINE CULTURE - Abnormal; Notable for the following components:    Occult Blood UA 1+ (*)     All other components within normal limits    Narrative:     Specimen Source->Urine   D DIMER, QUANTITATIVE - Abnormal; Notable for the following components:    D-Dimer 3.00 (*)     All other components within normal limits    Narrative:      Ddimt  critical result(s) called and verbal readback obtained from   Wilfrido Esqueda RN by EWT 03/21/2022 11:10   URINALYSIS MICROSCOPIC - Abnormal; Notable for the following components:    Hyaline Casts, UA 4 (*)     All other components within normal limits    Narrative:     Specimen Source->Urine   APTT   B-TYPE NATRIURETIC PEPTIDE   DRUG SCREEN PANEL, URINE EMERGENCY    Narrative:     Specimen Source->Urine   LIPASE   MAGNESIUM   PROTIME-INR   TROPONIN I   CK   T4, FREE        ECG Results          EKG 12-lead (In process)  Result time 03/21/22 10:54:31    In process by Interface, Lab In [facility] (03/21/22 10:54:31)                 Narrative:    Test Reason : R10.9,    Vent. Rate : 079 BPM     Atrial Rate : 079 BPM     P-R Int : 162 ms          QRS Dur : 084 ms      QT Int : 394 ms       P-R-T Axes : 055 031 050 degrees     QTc Int : 451 ms    Normal sinus rhythm  Normal ECG  When compared with ECG of 29-DEC-2021 02:07,  No significant change was found    Referred By: [email or web address]   SELF           Confirmed By:                             Imaging Results          US Lower Extremity Veins Bilateral (Final result)  Result time 03/21/22 12:52:04    Final result by Adriel Oneill MD (03/21/22 12:52:04)                 Narrative:    REASON: Elevated d-dimer.    FINDINGS:    Grayscale, color and spectral Doppler analysis of the bilateral lower extremity deep venous system was performed.    There is normal compressibility, color and spectral Doppler analysis, and augmentation in the bilateral lower extremity deep venous system.    IMPRESSION:    No DVT of the bilateral lower extremity  veins.    Electronically signed by:  Adriel Oneill   3/21/2022 12:52 PM CDT Workstation: [id number]                             CT Abdomen Pelvis With Contrast (Final result)  Result time 03/21/22 11:59:14    Final result by Bre Coreas IV, MD (03/21/22 11:59:14)                 Narrative:    All CT scans at this facility use dose modulation, degenerative reconstruction  and/or weight-based dosing when appropriate to reduce radiation dose to as low as reasonably achievable.    CT of the abdomen with contrast and CT of the pelvis with contrast    HISTORY: Epigastric abdominal pain.    The study utilizes 100 cc of intravenous nonionic contrast material.    Comparison is made to 3/16/2022 and 6/9/2020.    There is mild motion degradation.    The liver and spleen are normal in size and demonstrate no focal parenchymal abnormalities. The gallbladder is surgically absent. There is no biliary dilatation. The pancreas and adrenal glands are unremarkable.    The kidneys are normal in size and position. There is mild asymmetric prominence of the right renal pelvis and intrarenal collecting structures which may relate to mild chronic UPJ obstruction, similar to prior studies. Probable subcentimeter cysts are observed within the mid to upper cortex of the left kidney.    There are scattered atheromatous changes in the wall of the aorta and branches without aneurysmal dilatation.    There are numerous colonic diverticuli without evidence of acute diverticulitis. Retained fecal material is observed within the colon. The appendix is unremarkable.    There is no adenopathy or significant free fluid. There has been a previous hysterectomy. There are no adnexal masses. The urinary bladder is incompletely distended but grossly unremarkable.    There are no acute osseous abnormalities demonstrated.    IMPRESSION:    Mild dilatation of the right intrarenal collecting structures and right renal pelvis which may relate to mild  chronic right UPJ obstruction and appears similar to prior studies.    Scattered colonic diverticuli and retained colonic fecal material.    Additional observations as above.    Electronically signed by:  Bre Coreas MD  3/21/2022 11:59 AM CDT Workstation: [id number]                             CTA Chest Non-Coronary (PE Study) (Final result)  Result time 03/21/22 11:53:20    Final result by Alex Madsen MD (03/21/22 11:53:20)                 Narrative:    CMS MANDATED QUALITY DATA - CT RADIATION - 436    All CT scans at this facility utilize dose modulation, iterative reconstruction, and/or weight based dosing when appropriate to reduce radiation dose to as low as reasonably achievable.        Reason: Pulmonary embolism (PE) suspected, positive D-dimer    TECHNIQUE: CT angiography of thorax with 100 mL Omnipaque 350.  Maximum intensity projection coronal reformations were created at a separate workstation and stored in the patient's permanent medical record.    COMPARISON: None    CTA THORAX:  Negative for pulmonary embolus. Thoracic aorta is of normal caliber and without dissection.    Postsurgical change of right upper lobectomy is evident. Subpleural groundglass opacities in dependent lower lobes bilaterally suggest minor atelectasis, and lungs are otherwise clear. Trachea and main bronchi are patent.    No enlarged hilar, mediastinal, or axillary lymph nodes. No pleural or pericardial effusion.    Surgical clips near gallbladder fossa suggest cholecystectomy.    Surgical clips also occur in the neck. Fluid density in left neck suggest postsurgical change, incompletely visualized. Degenerative changes affect the spine.    IMPRESSION:  Negative for pulmonary embolus or other acute thoracic abnormality.    Electronically signed by:  Alex Madsen MD  3/21/2022 11:53 AM CDT Workstation: [id number]                             X-Ray Chest 1 View (Final result)  Result time 03/21/22 10:21:47    Final result by Alex  MD Tena (03/21/22 10:21:47)                 Narrative:    Reason: Abd pain    FINDINGS:  Portable chest at 1001 compared with 10/15/2021 shows normal cardiomediastinal silhouette.    Slight elevation or eventration right diaphragm unchanged. Lungs are otherwise clear. Pulmonary vasculature is normal. No acute osseous abnormality. Surgical clips are noted in paratracheal location near the thoracic inlet. Right upper quadrant surgical clips suggest cholecystectomy.    IMPRESSION:  No acute cardiopulmonary abnormality.    Electronically signed by:  Alex Madsen MD  3/21/2022 10:21 AM CDT Workstation: [id number]                               Medications   calcium carbonate 200 mg calcium (500 mg) chewable tablet 500 mg (500 mg Oral Given 3/21/22 1052)   iohexoL (OMNIPAQUE 350) injection 100 mL (100 mLs Intravenous Given 3/21/22 1138)     Medical Decision Making:   Clinical Tests:   Lab Tests: Reviewed  Radiological Study: Reviewed  Medical Tests: Reviewed  ED Management:  Patient with multiple complaints.  Abdominal exam with only mild diffuse tenderness, no rebound or guarding.  CT scan is negative for evidence of acute intra-abdominal abnormality.  Patient also complaining of chest pain and shortness of breath new in onset over the past several days with fatigue.  Chart review --no recent stress test or other cardiac testing other than EKGs noted.  Patient does have cardiac risk factors.  She is currently symptom free and hemodynamically stable.  EKG is negative for evidence of acute ischemia.  First troponin is normal.  Will discuss with hospitalist for admission for further cardiac evaluation.                      Clinical Impression:   Final diagnoses:  [R10.9] Abdominal pain  [R79.89] Elevated d-dimer  [R07.9] Chest pain, unspecified type (Primary)          ED Disposition Condition    Admit               Diann Leggett MD  03/21/22 1341

## 2022-03-21 NOTE — H&P
[facility] Medicine History & Physical Examination   Patient Name: Erica Masterson  MRN: [id number]  Patient Class: OP- Observation   Admission Date: 3/21/2022  9:21 AM  Length of Stay: 0  Attending Physician: Timmy Ramos MD  Primary Care Provider: Narayan Myers MD  Face-to-Face encounter date: 03/21/2022  Code Status: Full Code  MPOA:  Chief Complaint: Abdominal Pain (From LLQ that radiates up to neck x months despite multiple visits to PCP and ER.  (Had CT done at [facility] on Wednesday))        HISTORY OF PRESENT ILLNESS:   Erica Masterson is a 76 y.o. 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 Patel she go see her today because she has been feeling sensation of harm cramps tightness in the epigastric area with radiation to the neck and also shoulders associated with nausea but no vomiting for months now on and off she says it happens especially at nighttime sometimes during the day as well she says it happens every night of after she has been sleeping for about 2 hours she says that sometimes and it radiates to chest and left side of face has numbness aspirin 325 consult carbonate 200 mg, 1st set of EKG troponin he has no signs of acute ischemia she is going to be admitted to [facility].    REVIEW OF SYSTEMS:   10 Point Review of System was performed and was found to be negative except for that mentioned already in the HPI above. She states she has also being having leg cramps.    PAST MEDICAL HISTORY:       Past Medical History:   Diagnosis Date    Allergy     Anxiety     Cataract      Colon polyp     Degenerative arthritis of knee 4/3/2012    Diverticulosis     GERD (gastroesophageal reflux disease)     History of thyroid cancer 2021    Hyperlipidemia     Hypertension     Multinodular goiter 3/26/2012    Myopathy, unspecified 1/18/2010    Tuberculosis        PAST SURGICAL HISTORY:     Past Surgical History:   Procedure Laterality Date    BREAST BIOPSY Left 2015    benign    CHOLECYSTECTOMY      COLONOSCOPY  12/2/15    Dr. Britton, multiple polyps, recheck five years-three years if more than 2 polyps are adenomatous    COLONOSCOPY N/A 12/2/2015    COLONOSCOPY N/A 3/20/2019    Procedure: COLONOSCOPY;  Surgeon: Jose Britton MD;  Location: [facility] ENDO;  Service: Endoscopy;  Laterality: N/A;    COLONOSCOPY N/A 5/20/2020    Dr. Britton; internal hemorrhoids; diverticulosis; polyps removed; repeat in 3 years    CYSTOSCOPY N/A 8/26/2020    Procedure: CYSTOSCOPY;  Surgeon: Charlotte Walters Jr., MD;  [facility];  Service: Urology;  Laterality: N/A;    DISSECTION OF NECK Left 9/13/2021    Procedure: DISSECTION, NECK;  Surgeon: Ryan Torres MD;  Location: [address];  Service: ENT;  Laterality: Left;    ESOPHAGOGASTRODUODENOSCOPY N/A 5/20/2020    Dr. Britton; small hiatal hernia; gastritis; 8 gastric polyps removed    FOOT SURGERY      HYSTERECTOMY      TVH/BSO > 20 years    INTRALUMINAL GASTROINTESTINAL TRACT IMAGING VIA CAPSULE N/A 6/4/2020    Procedure: IMAGING PROCEDURE, GI TRACT, INTRALUMINAL, VIA CAPSULE;  Surgeon: Jose Britton MD;  Location: [facility] ENDO;  Service: Endoscopy;  Laterality: N/A;    KNEE SURGERY  06/06/2013    right knee tear Dr Iverson     LAPAROSCOPIC CHOLECYSTECTOMY N/A 9/17/2020    Procedure: CHOLECYSTECTOMY, LAPAROSCOPIC;  Surgeon: Forrest Veronica MD;  Location: [facility] OR;  Service: General;  Laterality: N/A;    LUNG LOBECTOMY  1966    right middle lobectomy, due to Tb    OOPHORECTOMY      SHOULDER SURGERY      francis     THYROIDECTOMY  Bilateral 5/19/2021    Procedure: THYROIDECTOMY;  Surgeon: Jamia Amezquita MD;  [facility];  Service: General;  Laterality: Bilateral;    THYROIDECTOMY Bilateral 9/13/2021    Procedure: THYROIDECTOMY WITH INTRA-OP PTH;  Surgeon: Ryan Torres MD;  Location: [facility] OR 2ND FLR;  Service: ENT;  Laterality: Bilateral;  NIM tube  Intraop PTH       ALLERGIES:   Patient has no known allergies.    FAMILY HISTORY:     Family History   Problem Relation Age of Onset    Colon cancer Other     Cancer Mother     Hypertension Mother     Hyperlipidemia Mother     Cancer Brother     Hypertension Father     Emphysema Father     Diabetes Son     Hypertension Son     Alcohol abuse Son     Diabetes Maternal Aunt     Alzheimer's disease Maternal Uncle     Cancer Maternal Grandmother     No Known Problems Daughter     Dementia Sister     Alzheimer's disease Sister     Breast cancer Sister 60    Obesity Paternal Uncle     Prostate cancer Other     Melanoma Neg Hx     Psoriasis Neg Hx     Lupus Neg Hx     Eczema Neg Hx     Amblyopia Neg Hx     Blindness Neg Hx     Cataracts Neg Hx     Glaucoma Neg Hx     Macular degeneration Neg Hx     Retinal detachment Neg Hx     Strabismus Neg Hx     Stroke Neg Hx     Thyroid cancer Neg Hx     Colon polyps Neg Hx     Ulcerative colitis Neg Hx     Stomach cancer Neg Hx     Rectal cancer Neg Hx    Aunt with h.o. thyroid cancer.    SOCIAL HISTORY:     Social History     Tobacco Use    Smoking status: Never Smoker    Smokeless tobacco: Never Used   Substance Use Topics    Alcohol use: Not Currently     Comment: stopped 2019        Social History     Substance and Sexual Activity   Sexual Activity Not Currently        HOME MEDICATIONS:     Prior to Admission medications    Medication Sig Start Date End Date Taking? Authorizing Provider   amLODIPine (NORVASC) 2.5 MG tablet Take 1 tablet by mouth once daily 11/22/21  Yes Dustin Robles MD    carboxymethylcellulose sodium (REFRESH OPHT) Apply 1 drop to eye daily as needed.   Yes Historical Provider   conjugated estrogens (PREMARIN) vaginal cream Place 0.5 g vaginally once daily AND 0.5 g twice a week.  Patient taking differently: Place 0.5 g vaginally once daily AND 0.5 g twice a week. Saturdays and tuesdays 2/23/22 2/8/23 Yes Mat Beach MD   doxepin (SINEQUAN) 10 MG capsule Take 10 mg by mouth every evening.   Yes Historical Provider   ergocalciferol (ERGOCALCIFEROL) 50,000 unit Cap Take 1 capsule (50,000 Units total) by mouth every 30 days. 1/6/22  Yes Juan M Landrum MD   gabapentin (NEURONTIN) 300 MG capsule Take 1 capsule (300 mg total) by mouth 3 (three) times daily. 2/3/22 2/3/23 Yes Adriel Iverson MD   levothyroxine (SYNTHROID) 112 MCG tablet Take 1 tablet (112 mcg total) by mouth before breakfast. 7/6/21 7/6/22 Yes Juan M Landrum MD   LINZESS 290 mcg Cap capsule Take 290 mcg by mouth once daily. prn 9/21/21  Yes Historical Provider   losartan (COZAAR) 100 MG tablet Take 1 tablet (100 mg total) by mouth once daily. 4/28/21 4/28/22 Yes Narayan Myers MD   ondansetron (ZOFRAN) 4 MG tablet Take 1 tablet (4 mg total) by mouth every 12 (twelve) hours as needed for Nausea. 3/16/22 3/31/22 Yes Deja Mendoza NP   potassium chloride (KLOR-CON) 10 MEQ TbSR Take 10 mEq by mouth every other day. 1/15/22  Yes Historical Provider   rosuvastatin (CRESTOR) 20 MG tablet TAKE 1 TABLET BY MOUTH ONCE DAILY IN THE EVENING 1/18/22  Yes Narayan Myers MD   simethicone (MYLICON) 125 MG chewable tablet Take 125 mg by mouth every 6 (six) hours as needed for Flatulence.   Yes Historical Provider   blood sugar diagnostic (BLOOD GLUCOSE TEST) Strp True Metrix glucose strips check once daily 2/2/21   Narayan Myers MD   blood-glucose meter kit True Metrix glucometer check glucose once daily 5/5/20 10/19/21  Narayan Myers MD   calcium carbonate (TUMS) 200 mg calcium (500 mg) chewable tablet  "Chew and swallow 2 tablets (1,000 mg total) by mouth 3 (three) times daily. for 14 days 9/14/21 10/19/21  Nilay Chou MD   metFORMIN (GLUCOPHAGE-XR) 500 MG ER 24hr tablet     Historical Provider   sodium chloride (OCEAN) 0.65 % nasal spray 1 spray by Nasal route as needed for Congestion.    Historical Provider   tiZANidine (ZANAFLEX) 4 MG tablet Take 4 mg by mouth every 6 (six) hours as needed.    Historical Provider         PHYSICAL EXAM:   BP (!) 163/71   Pulse 91   Temp 98.4 °F (36.9 °C) (Oral)   Resp 20   Ht 5' 6" (1.676 m)   Wt 67.1 kg (148 lb)   SpO2 97%   BMI 23.89 kg/m²   Vitals Reviewed  General appearance:female in no apparent distress, she was having cramp on LLE at the beginning of my examination that resolved with stretching.  Skin: No Rash.   Neuro: Motor and sensory exams grossly intact. Good tone. Power in all 4 extremities 5/5. No hand tremors. No pronator drift.  HENT: Atraumatic head. Moist mucous membranes of oral cavity.  Eyes: Normal extraocular movements.   Neck: Supple. No evidence of lymphadenopathy. No thyroidomegaly.  Lungs: Clear to auscultation bilaterally. No wheezing present.   Heart: Regular rate and rhythm. S1 and S2 present with no murmurs/gallop/rub. No pedal edema. No JVD present.   Abdomen: Soft, non-distended, mild tenderness to palpation on upper periumbilical/epigastric area, no signs of peritonitis. No rebound tenderness/guarding. No masses or organomegaly. Bowel sounds are normal. Bladder is not palpable.   Extremities: No cyanosis, clubbing.  Psych/mental status: Alert and oriented. Cooperative. Responds appropriately to questions.   EMERGENCY DEPARTMENT LABS AND IMAGING:     Labs Reviewed   CBC W/ AUTO DIFFERENTIAL - Abnormal; Notable for the following components:       Result Value    Hemoglobin 11.7 (*)     MCHC 31.0 (*)     MPV 8.6 (*)     Lymph # 0.9 (*)     Gran % 74.6 (*)     Lymph % 17.1 (*)     All other components within normal limits   COMPREHENSIVE " METABOLIC PANEL - Abnormal; Notable for the following components:    Calcium 8.2 (*)     Alkaline Phosphatase 38 (*)     All other components within normal limits   TSH - Abnormal; Notable for the following components:    TSH 0.010 (*)     All other components within normal limits   URINALYSIS, REFLEX TO URINE CULTURE - Abnormal; Notable for the following components:    Occult Blood UA 1+ (*)     All other components within normal limits    Narrative:     Specimen Source->Urine   D DIMER, QUANTITATIVE - Abnormal; Notable for the following components:    D-Dimer 3.00 (*)     All other components within normal limits    Narrative:      Ddimt  critical result(s) called and verbal readback obtained from   Wilfrido Esqueda RN by EWT 03/21/2022 11:10   URINALYSIS MICROSCOPIC - Abnormal; Notable for the following components:    Hyaline Casts, UA 4 (*)     All other components within normal limits    Narrative:     Specimen Source->Urine   CULTURE, URINE   APTT   B-TYPE NATRIURETIC PEPTIDE   DRUG SCREEN PANEL, URINE EMERGENCY    Narrative:     Specimen Source->Urine   LIPASE   MAGNESIUM   PROTIME-INR   TROPONIN I   CK   T4, FREE   T3, FREE   T3, FREE   SARS-COV-2 RNA AMPLIFICATION, QUAL       US Lower Extremity Veins Bilateral   Final Result      CT Abdomen Pelvis With Contrast   Final Result      CTA Chest Non-Coronary (PE Study)   Final Result      X-Ray Chest 1 View   Final Result          ASSESSMENT & PLAN:   Erica Masterson is a 76 y.o. female admitted for    Epigastric/cest pain, acs rule out. She has h.o. small hiiatal hernia and gastritis by egd in 2020.  Schedule appointment for scopes due to similar symptoms at [facility] ER last week.  Diagnose of rectal prolapse on 03/16, has op appointemetn with surgeon for it.  H.o. thyroid cancer s/p total thyroidectomy on 05/2020, on L-thyroxine since then.  GERD  Dyslipidemia  HTN  Pre-doiiabetes  H.o. TB s/p R middle lobectomy in 2020  Normocitic anemia, unclear etiology at this  time  H.o. borderline low Vit b12 level  H.o. low vitamin B1 level  H.o. Low vitamin d level  H.o. Osteoporosis, improved to osteopenia later.  H.o. iron deficiency            Plan  Admit patient to medical floor with telemetry  Repeat troponin in 6 hours x2  Protonix IV 40 mg a day 1st dose now  Ionized calcium level  Item folic acid and vitamin B12 thiamine vitamin-D level  Free T3 level will, levothyroxine as she takes home. urine culture blood culture      Diet: Diabetic    DVT Prophylaxis: low molecular weight heparin and Encourage ambulation. OOB as tolerated.     Discharge Planning and Disposition:  Home independent      This patient is high risk for life-threatening deterioration and death secondary to above comorbidities and need for IV treatment. This patient meets inpatient criteria.   ________________________________________________________________________________    Face-to-Face encounter date: 03/21/2022  Encounter included review of the medical records, interviewing and examining the patient face-to-face, discussion with family and other health care providers including emergency medicine physician, admission orders, interpreting lab/test results and formulating a plan of care.   Medical Decision Making during this encounter was High Complexity.________________________________________________________________________________    INPATIENT LIST OF MEDICATIONS     Current Facility-Administered Medications:     amLODIPine tablet 10 mg, 10 mg, Oral, Daily, Timmy Ramos MD    enoxaparin injection 40 mg, 40 mg, Subcutaneous, Daily, Timmy Ramos MD    gabapentin capsule 300 mg, 300 mg, Oral, TID, Timmy Ramos MD    pantoprazole injection 40 mg, 40 mg, Intravenous, Daily, Timmy Ramos MD    pravastatin tablet 80 mg, 80 mg, Oral, Daily, Timmy Ramos MD    simethicone chewable tablet 160 mg, 2 tablet, Oral, TID PRN, Timmy Ramos MD    simethicone chewable tablet 80 mg, 1 tablet,  Oral, Once, Timmy Ramos MD    sodium chloride 0.9% flush 2 mL, 2 mL, Intravenous, Q6H PRN, Timmy Ramos MD    Current Outpatient Medications:     amLODIPine (NORVASC) 2.5 MG tablet, Take 1 tablet by mouth once daily, Disp: 90 tablet, Rfl: 0    carboxymethylcellulose sodium (REFRESH OPHT), Apply 1 drop to eye daily as needed., Disp: , Rfl:     conjugated estrogens (PREMARIN) vaginal cream, Place 0.5 g vaginally once daily AND 0.5 g twice a week. (Patient taking differently: Place 0.5 g vaginally once daily AND 0.5 g twice a week. Saturdays and tuesdays), Disp: 30 g, Rfl: 7    doxepin (SINEQUAN) 10 MG capsule, Take 10 mg by mouth every evening., Disp: , Rfl:     ergocalciferol (ERGOCALCIFEROL) 50,000 unit Cap, Take 1 capsule (50,000 Units total) by mouth every 30 days., Disp: 3 capsule, Rfl: 3    gabapentin (NEURONTIN) 300 MG capsule, Take 1 capsule (300 mg total) by mouth 3 (three) times daily., Disp: 90 capsule, Rfl: 0    levothyroxine (SYNTHROID) 112 MCG tablet, Take 1 tablet (112 mcg total) by mouth before breakfast., Disp: 30 tablet, Rfl: 11    LINZESS 290 mcg Cap capsule, Take 290 mcg by mouth once daily. prn, Disp: , Rfl:     losartan (COZAAR) 100 MG tablet, Take 1 tablet (100 mg total) by mouth once daily., Disp: 90 tablet, Rfl: 3    ondansetron (ZOFRAN) 4 MG tablet, Take 1 tablet (4 mg total) by mouth every 12 (twelve) hours as needed for Nausea., Disp: 30 tablet, Rfl: 0    potassium chloride (KLOR-CON) 10 MEQ TbSR, Take 10 mEq by mouth every other day., Disp: , Rfl:     rosuvastatin (CRESTOR) 20 MG tablet, TAKE 1 TABLET BY MOUTH ONCE DAILY IN THE EVENING, Disp: 90 tablet, Rfl: 0    simethicone (MYLICON) 125 MG chewable tablet, Take 125 mg by mouth every 6 (six) hours as needed for Flatulence., Disp: , Rfl:     blood sugar diagnostic (BLOOD GLUCOSE TEST) Strp, True Metrix glucose strips check once daily, Disp: 100 each, Rfl: 3    blood-glucose meter kit, True Metrix glucometer check  glucose once daily, Disp: 1 each, Rfl: 0    calcium carbonate (TUMS) 200 mg calcium (500 mg) chewable tablet, Chew and swallow 2 tablets (1,000 mg total) by mouth 3 (three) times daily. for 14 days, Disp: 100 tablet, Rfl: 0    metFORMIN (GLUCOPHAGE-XR) 500 MG ER 24hr tablet, , Disp: , Rfl:     sodium chloride (OCEAN) 0.65 % nasal spray, 1 spray by Nasal route as needed for Congestion., Disp: , Rfl:     tiZANidine (ZANAFLEX) 4 MG tablet, Take 4 mg by mouth every 6 (six) hours as needed., Disp: , Rfl:     Facility-Administered Medications Ordered in Other Encounters:     electrolyte-S (ISOLYTE), , Intravenous, Continuous, Nilay Jeffries MD, Last Rate: 10 mL/hr at 09/17/20 0950, New Bag at 09/17/20 1155    lactated ringers infusion, , Intravenous, Continuous, Nilay Jeffries MD      Scheduled Meds:   amLODIPine  10 mg Oral Daily    enoxaparin  40 mg Subcutaneous Daily    gabapentin  300 mg Oral TID    pantoprazole  40 mg Intravenous Daily    pravastatin  80 mg Oral Daily    simethicone  1 tablet Oral Once     Continuous Infusions:  PRN Meds:.simethicone, sodium chloride 0.9%      Timmy Ramos  [facility] Hospitalist  03/21/2022

## 2022-03-22 PROBLEM — N18.30 STAGE 3 CHRONIC KIDNEY DISEASE: Chronic | Status: ACTIVE | Noted: 2020-08-03

## 2022-03-22 PROBLEM — E11.9 TYPE 2 DIABETES MELLITUS, WITHOUT LONG-TERM CURRENT USE OF INSULIN: Chronic | Status: ACTIVE | Noted: 2020-03-24

## 2022-03-22 PROBLEM — N17.9 AKI (ACUTE KIDNEY INJURY): Status: RESOLVED | Noted: 2020-05-12 | Resolved: 2022-03-22

## 2022-03-22 PROBLEM — F41.1 GAD (GENERALIZED ANXIETY DISORDER): Chronic | Status: ACTIVE | Noted: 2020-08-03

## 2022-03-22 PROBLEM — E89.0 POSTOPERATIVE HYPOTHYROIDISM: Chronic | Status: ACTIVE | Noted: 2021-07-29

## 2022-03-22 PROBLEM — R07.9 CHEST PAIN: Status: ACTIVE | Noted: 2022-03-22

## 2022-03-22 PROBLEM — C73 THYROID CANCER: Chronic | Status: ACTIVE | Noted: 2021-05-19

## 2022-03-22 LAB
25(OH)D3+25(OH)D2 SERPL-MCNC: 40 NG/ML (ref 30–96)
ANION GAP SERPL CALC-SCNC: 6 MMOL/L (ref 8–16)
BASOPHILS # BLD AUTO: 0.03 K/UL (ref 0–0.2)
BASOPHILS NFR BLD: 0.6 % (ref 0–1.9)
BUN SERPL-MCNC: 16 MG/DL (ref 8–23)
CALCIUM SERPL-MCNC: 7.8 MG/DL (ref 8.7–10.5)
CHLORIDE SERPL-SCNC: 103 MMOL/L (ref 95–110)
CO2 SERPL-SCNC: 30 MMOL/L (ref 23–29)
CREAT SERPL-MCNC: 0.9 MG/DL (ref 0.5–1.4)
DIFFERENTIAL METHOD: ABNORMAL
EOSINOPHIL # BLD AUTO: 0.1 K/UL (ref 0–0.5)
EOSINOPHIL NFR BLD: 1.3 % (ref 0–8)
ERYTHROCYTE [DISTWIDTH] IN BLOOD BY AUTOMATED COUNT: 14 % (ref 11.5–14.5)
EST. GFR  (AFRICAN AMERICAN): >60 ML/MIN/1.73 M^2
EST. GFR  (NON AFRICAN AMERICAN): >60 ML/MIN/1.73 M^2
FERRITIN SERPL-MCNC: 102 NG/ML (ref 20–300)
FOLATE SERPL-MCNC: 18.9 NG/ML (ref 4–24)
GLUCOSE SERPL-MCNC: 104 MG/DL (ref 70–110)
GLUCOSE SERPL-MCNC: 117 MG/DL (ref 70–110)
GLUCOSE SERPL-MCNC: 117 MG/DL (ref 70–110)
GLUCOSE SERPL-MCNC: 145 MG/DL (ref 70–110)
HCT VFR BLD AUTO: 37.9 % (ref 37–48.5)
HGB BLD-MCNC: 11.6 G/DL (ref 12–16)
IMM GRANULOCYTES # BLD AUTO: 0.01 K/UL (ref 0–0.04)
IMM GRANULOCYTES NFR BLD AUTO: 0.2 % (ref 0–0.5)
IRON SERPL-MCNC: 56 UG/DL (ref 30–160)
LYMPHOCYTES # BLD AUTO: 1.2 K/UL (ref 1–4.8)
LYMPHOCYTES NFR BLD: 24.8 % (ref 18–48)
MCH RBC QN AUTO: 28.2 PG (ref 27–31)
MCHC RBC AUTO-ENTMCNC: 30.6 G/DL (ref 32–36)
MCV RBC AUTO: 92 FL (ref 82–98)
MONOCYTES # BLD AUTO: 0.4 K/UL (ref 0.3–1)
MONOCYTES NFR BLD: 9.2 % (ref 4–15)
NEUTROPHILS # BLD AUTO: 3 K/UL (ref 1.8–7.7)
NEUTROPHILS NFR BLD: 63.9 % (ref 38–73)
NRBC BLD-RTO: 0 /100 WBC
PHOSPHATE SERPL-MCNC: 3.9 MG/DL (ref 2.7–4.5)
PLATELET # BLD AUTO: 268 K/UL (ref 150–450)
PMV BLD AUTO: 9.3 FL (ref 9.2–12.9)
POTASSIUM SERPL-SCNC: 3.9 MMOL/L (ref 3.5–5.1)
RBC # BLD AUTO: 4.12 M/UL (ref 4–5.4)
SATURATED IRON: 20 % (ref 20–50)
SODIUM SERPL-SCNC: 139 MMOL/L (ref 136–145)
TOTAL IRON BINDING CAPACITY: 279 UG/DL (ref 250–450)
TRANSFERRIN SERPL-MCNC: 199 MG/DL (ref 200–375)
TROPONIN I SERPL DL<=0.01 NG/ML-MCNC: <0.03 NG/ML
VIT B12 SERPL-MCNC: 306 PG/ML (ref 210–950)
WBC # BLD AUTO: 4.67 K/UL (ref 3.9–12.7)

## 2022-03-22 PROCEDURE — 25000003 PHARM REV CODE 250: Performed by: INTERNAL MEDICINE

## 2022-03-22 PROCEDURE — 84425 ASSAY OF VITAMIN B-1: CPT | Performed by: INTERNAL MEDICINE

## 2022-03-22 PROCEDURE — 82728 ASSAY OF FERRITIN: CPT | Performed by: INTERNAL MEDICINE

## 2022-03-22 PROCEDURE — 63600175 PHARM REV CODE 636 W HCPCS: Performed by: INTERNAL MEDICINE

## 2022-03-22 PROCEDURE — 36415 COLL VENOUS BLD VENIPUNCTURE: CPT | Performed by: FAMILY MEDICINE

## 2022-03-22 PROCEDURE — 63600175 PHARM REV CODE 636 W HCPCS: Performed by: FAMILY MEDICINE

## 2022-03-22 PROCEDURE — 84466 ASSAY OF TRANSFERRIN: CPT | Performed by: INTERNAL MEDICINE

## 2022-03-22 PROCEDURE — 93005 ELECTROCARDIOGRAM TRACING: CPT | Performed by: INTERNAL MEDICINE

## 2022-03-22 PROCEDURE — 84484 ASSAY OF TROPONIN QUANT: CPT | Performed by: FAMILY MEDICINE

## 2022-03-22 PROCEDURE — 82306 VITAMIN D 25 HYDROXY: CPT | Performed by: INTERNAL MEDICINE

## 2022-03-22 PROCEDURE — 82962 GLUCOSE BLOOD TEST: CPT

## 2022-03-22 PROCEDURE — 80048 BASIC METABOLIC PNL TOTAL CA: CPT | Performed by: INTERNAL MEDICINE

## 2022-03-22 PROCEDURE — 25000242 PHARM REV CODE 250 ALT 637 W/ HCPCS

## 2022-03-22 PROCEDURE — 99220 PR INITIAL OBSERVATION CARE,LEVL III: CPT | Mod: ,,,

## 2022-03-22 PROCEDURE — 85025 COMPLETE CBC W/AUTO DIFF WBC: CPT | Performed by: INTERNAL MEDICINE

## 2022-03-22 PROCEDURE — 96376 TX/PRO/DX INJ SAME DRUG ADON: CPT

## 2022-03-22 PROCEDURE — 82746 ASSAY OF FOLIC ACID SERUM: CPT | Performed by: INTERNAL MEDICINE

## 2022-03-22 PROCEDURE — 63600175 PHARM REV CODE 636 W HCPCS

## 2022-03-22 PROCEDURE — G0378 HOSPITAL OBSERVATION PER HR: HCPCS

## 2022-03-22 PROCEDURE — 93010 EKG 12-LEAD: ICD-10-PCS | Mod: ,,, | Performed by: INTERNAL MEDICINE

## 2022-03-22 PROCEDURE — 82607 VITAMIN B-12: CPT | Performed by: INTERNAL MEDICINE

## 2022-03-22 PROCEDURE — 25000003 PHARM REV CODE 250: Performed by: FAMILY MEDICINE

## 2022-03-22 PROCEDURE — 96372 THER/PROPH/DIAG INJ SC/IM: CPT | Mod: 59

## 2022-03-22 PROCEDURE — 99220 PR INITIAL OBSERVATION CARE,LEVL III: ICD-10-PCS | Mod: ,,,

## 2022-03-22 PROCEDURE — C9113 INJ PANTOPRAZOLE SODIUM, VIA: HCPCS | Performed by: INTERNAL MEDICINE

## 2022-03-22 PROCEDURE — 36415 COLL VENOUS BLD VENIPUNCTURE: CPT | Performed by: INTERNAL MEDICINE

## 2022-03-22 PROCEDURE — 93010 ELECTROCARDIOGRAM REPORT: CPT | Mod: ,,, | Performed by: INTERNAL MEDICINE

## 2022-03-22 PROCEDURE — 84100 ASSAY OF PHOSPHORUS: CPT | Performed by: INTERNAL MEDICINE

## 2022-03-22 RX ORDER — NITROGLYCERIN 0.4 MG/1
TABLET SUBLINGUAL
Status: COMPLETED
Start: 2022-03-22 | End: 2022-03-22

## 2022-03-22 RX ORDER — ONDANSETRON 2 MG/ML
INJECTION INTRAMUSCULAR; INTRAVENOUS
Status: COMPLETED
Start: 2022-03-22 | End: 2022-03-22

## 2022-03-22 RX ORDER — NITROGLYCERIN 0.4 MG/1
0.4 TABLET SUBLINGUAL EVERY 5 MIN PRN
Status: DISCONTINUED | OUTPATIENT
Start: 2022-03-22 | End: 2022-03-23 | Stop reason: HOSPADM

## 2022-03-22 RX ORDER — NAPROXEN SODIUM 220 MG/1
81 TABLET, FILM COATED ORAL DAILY
Status: DISCONTINUED | OUTPATIENT
Start: 2022-03-22 | End: 2022-03-23 | Stop reason: HOSPADM

## 2022-03-22 RX ORDER — ONDANSETRON 2 MG/ML
4 INJECTION INTRAMUSCULAR; INTRAVENOUS EVERY 6 HOURS PRN
Status: DISCONTINUED | OUTPATIENT
Start: 2022-03-22 | End: 2022-03-23 | Stop reason: HOSPADM

## 2022-03-22 RX ORDER — SODIUM CHLORIDE, SODIUM LACTATE, POTASSIUM CHLORIDE, CALCIUM CHLORIDE 600; 310; 30; 20 MG/100ML; MG/100ML; MG/100ML; MG/100ML
INJECTION, SOLUTION INTRAVENOUS CONTINUOUS
Status: ACTIVE | OUTPATIENT
Start: 2022-03-22 | End: 2022-03-23

## 2022-03-22 RX ADMIN — GABAPENTIN 300 MG: 300 CAPSULE ORAL at 08:03

## 2022-03-22 RX ADMIN — ONDANSETRON 4 MG: 2 INJECTION INTRAMUSCULAR; INTRAVENOUS at 08:03

## 2022-03-22 RX ADMIN — NITROGLYCERIN 0.4 MG: 0.4 TABLET SUBLINGUAL at 08:03

## 2022-03-22 RX ADMIN — GABAPENTIN 300 MG: 300 CAPSULE ORAL at 03:03

## 2022-03-22 RX ADMIN — PRAVASTATIN SODIUM 80 MG: 40 TABLET ORAL at 08:03

## 2022-03-22 RX ADMIN — ASPIRIN 81 MG 81 MG: 81 TABLET ORAL at 08:03

## 2022-03-22 RX ADMIN — SODIUM CHLORIDE, SODIUM LACTATE, POTASSIUM CHLORIDE, AND CALCIUM CHLORIDE: .6; .31; .03; .02 INJECTION, SOLUTION INTRAVENOUS at 08:03

## 2022-03-22 RX ADMIN — ENOXAPARIN SODIUM 40 MG: 40 INJECTION SUBCUTANEOUS at 05:03

## 2022-03-22 RX ADMIN — PANTOPRAZOLE SODIUM 40 MG: 40 INJECTION, POWDER, FOR SOLUTION INTRAVENOUS at 08:03

## 2022-03-22 RX ADMIN — AMLODIPINE BESYLATE 10 MG: 5 TABLET ORAL at 08:03

## 2022-03-22 RX ADMIN — LEVOTHYROXINE SODIUM 112 MCG: 0.11 TABLET ORAL at 05:03

## 2022-03-22 NOTE — PLAN OF CARE
Medicare Outpatient Observation Notice was signed, explained and given to patient/caregiver on 03/22/2022 at 8:38am     addressed any questions or concerns.    Medicare Outpatient Observation Notice will be scanned into patient's medical record

## 2022-03-22 NOTE — CARE UPDATE
03/21/22 2102   Patient Assessment/Suction   Level of Consciousness (AVPU) alert   PRE-TX-O2   O2 Device (Oxygen Therapy) room air   SpO2 98 %   Pulse 86   Resp 14   Respiratory Interventions   Cough And Deep Breathing done with encouragement   Breathing Techniques/Airway Clearance deep/controlled cough encouraged;diaphragmatic breathing promoted   Education   $ Education Other (see comment);15 min  (Resp assessment, pox, deep diaphragmatic breathing exercises.)   Respiratory Evaluation   $ Care Plan Tech Time 15 min   $ Eval/Re-eval Charges Evaluation   Evaluation For New Orders   Home Oxygen   Has Home Oxygen? No   Home Aerosol, MDI, DPI, and Other Treatments/Therapies   Home Respiratory Therapy Per Patient/Review of Chart No

## 2022-03-22 NOTE — CONSULTS
[facility]  Department of Cardiology  Consult Note      PATIENT NAME: Erica Masterson  MRN: [id number]  TODAY'S DATE: 03/22/2022  ADMIT DATE: 3/21/2022                          CONSULT REQUESTED BY: Dustin Mcmahon MD    SUBJECTIVE     PRINCIPAL PROBLEM: <principal problem not specified>      REASON FOR CONSULT:  Chest pain       HPI:  76-year-old  female with a past medical history of thyroid cancer, essential hypertension, prediabetes, tuberculosis, hiatal hernia on, and gastritis who is currently being worked up for rectal prolapse came to the ED due to epigastric pain that radiates to the neck and left chest. Troponins have been negative x2, EKG normal sinus rhythm with no ischemic changes, chest x-ray clear BMP within normal limits.  She has undergone a stress test in 2017, but she has never had any revascularization procedures, no known history of coronary artery disease.  Echo in 2019 with LVH, EF is 70% mitral valve calcification, and atrial septal aneurysm (stable since 2017).  Pt reports have been suffering from intermittent tightness to her chest, abdomen, back, and neck associated with nausea and vomiting for the past 6 months. She feels like choking at times when her neck gets tight, but denies having difficulty swallowing solid food. Pt notes to also have episodes of muscle spasms and tightness in her legs follow by brief plantar flexion contracture. She had several ER visits with similar complaints. Recent EGD and CT Abd were unremarkable.  Pt was diagnosed with thyroid cancer stage III in May 2021 and underwent thyroidectomy twice. Dysphagia is a residual symptom pt notes to have since her second thyroidectomy. Did not require chemotherapy with recommendation for TSH lvl monitoring. Patient was given nitroglycerin this morning with significant improvement to her symptoms including SOB, chest pain, epigastric pain, and swallowing. She states haven't felt this relieved in  a while and that this is the first time she was given nitroglycerin.     Per H and P:  Erica Masterson is a 76 y.o. 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 Patel she go see her today because she has been feeling sensation of harm cramps tightness in the epigastric area with radiation to the neck and also shoulders associated with nausea but no vomiting for months now on and off she says it happens especially at nighttime sometimes during the day as well she says it happens every night of after she has been sleeping for about 2 hours she says that sometimes and it radiates to chest and left side of face has numbness aspirin 325 consult carbonate 200 mg, 1st set of EKG troponin he has no signs of acute ischemia she is going to be admitted to [facility].    Review of patient's allergies indicates:  No Known Allergies    Past Medical History:   Diagnosis Date    Allergy     Anxiety     Cataract     Colon polyp     Degenerative arthritis of knee 4/3/2012    Diverticulosis     GERD (gastroesophageal reflux disease)     History of thyroid cancer 2021    Hyperlipidemia     Hypertension     Multinodular goiter 3/26/2012    Myopathy, unspecified 1/18/2010    Tuberculosis      Past Surgical History:   Procedure Laterality Date    BREAST BIOPSY Left 2015    benign    CHOLECYSTECTOMY      COLONOSCOPY  12/2/15    Dr. Britton, multiple polyps, recheck five years-three years if more than 2 polyps are adenomatous    COLONOSCOPY N/A 12/2/2015    COLONOSCOPY N/A 3/20/2019    Procedure: COLONOSCOPY;  Surgeon: Jose  VETO Britton MD;  Location: [facility];  Service: Endoscopy;  Laterality: N/A;    COLONOSCOPY N/A 5/20/2020    Dr. Britton; internal hemorrhoids; diverticulosis; polyps removed; repeat in 3 years    CYSTOSCOPY N/A 8/26/2020    Procedure: CYSTOSCOPY;  Surgeon: Charlotte Walters Jr., MD;  Location: [facility];  Service: Urology;  Laterality: N/A;    DISSECTION OF NECK Left 9/13/2021    Procedure: DISSECTION, NECK;  Surgeon: Ryan Torres MD;  [facility];  Service: ENT;  Laterality: Left;    ESOPHAGOGASTRODUODENOSCOPY N/A 5/20/2020    Dr. Britton; small hiatal hernia; gastritis; 8 gastric polyps removed    FOOT SURGERY      HYSTERECTOMY      TVH/BSO > 20 years    INTRALUMINAL GASTROINTESTINAL TRACT IMAGING VIA CAPSULE N/A 6/4/2020    Procedure: IMAGING PROCEDURE, GI TRACT, INTRALUMINAL, VIA CAPSULE;  Surgeon: Jose Britton MD;  Location: [facility];  Service: Endoscopy;  Laterality: N/A;    KNEE SURGERY  06/06/2013    right knee tear Dr Iverson     LAPAROSCOPIC CHOLECYSTECTOMY N/A 9/17/2020    Procedure: CHOLECYSTECTOMY, LAPAROSCOPIC;  Surgeon: Forrest Veronica MD;  [facility];  Service: General;  Laterality: N/A;    LUNG LOBECTOMY  1966    right middle lobectomy, due to Tb    OOPHORECTOMY      SHOULDER SURGERY      francis     THYROIDECTOMY Bilateral 5/19/2021    Procedure: THYROIDECTOMY;  Surgeon: Jamia Amezquita MD;  [facility];  Service: General;  Laterality: Bilateral;    THYROIDECTOMY Bilateral 9/13/2021    Procedure: THYROIDECTOMY WITH INTRA-OP PTH;  Surgeon: Ryan Torres MD;  [facility];  Service: ENT;  Laterality: Bilateral;  NIM tube  Intraop PTH     Social History     Tobacco Use    Smoking status: Never Smoker    Smokeless tobacco: Never Used   Substance Use Topics    Alcohol use: Not Currently     Comment: stopped 2019    Drug use: No        REVIEW OF SYSTEMS   CONSTITUTIONAL: Negative for chills, fatigue and fever.   EYES: No double  vision, No blurred vision  NEURO: No headaches, No dizziness  RESPIRATORY: Negative for cough, shortness of breath and wheezing.    CARDIOVASCULAR: Negative for chest pain. Negative for palpitations and leg swelling.   GI: Negative for abdominal pain, No melena, diarrhea, nausea and vomiting.   : Negative for dysuria and frequency, Negative for hematuria  SKIN: Negative for bruising, Negative for edema or discoloration noted.   ENDOCRINE: Negative for polyphagia, Negative for heat intolerance, Negative for cold intolerance  PSYCHIATRIC: Negative for depression, Negative for anxiety, Negative for memory loss  MUSCULOSKELETAL: Negative for neck pain, Negative for muscle weakness, Negative for back pain     OBJECTIVE     VITAL SIGNS (Most Recent)  Temp: 98.2 °F (36.8 °C) (03/22/22 0727)  Pulse: 110 (03/22/22 0829)  Resp: 18 (03/22/22 0727)  BP: 126/74 (03/22/22 0727)  SpO2: 100 % (03/22/22 0829)    VENTILATION STATUS  Resp: 18 (03/22/22 0727)  SpO2: 100 % (03/22/22 0829)       I & O (Last 24H):No intake or output data in the 24 hours ending 03/22/22 0935    WEIGHTS  Wt Readings from Last 3 Encounters:   03/21/22 1655 67.1 kg (148 lb)   03/21/22 0924 67.1 kg (148 lb)   03/16/22 1326 67.7 kg (149 lb 4 oz)   03/16/22 0250 67.6 kg (149 lb)       PHYSICAL EXAM  GENERAL: well built, well nourished, well-developed in no apparent distress alert and oriented.   HEENT: Normocephalic. Pupils normal and conjunctivae normal.  Mucous membranes normal, no cyanosis or icterus, trachea central,no pallor or icterus is noted..   NECK: No JVD. No bruit. Cervical tenderness  THYROID: Thyroid not enlarged. No nodules present..   CARDIAC: Regular rate and rhythm. S1 is normal.S2 is normal.No gallops, clicks or murmurs noted at this time.  CHEST ANATOMY: normal.   LUNGS: Clear to auscultation. No wheezing or rhonchi..   ABDOMEN: Soft no masses or organomegaly.  No abdomen pulsations or bruits.  Normal bowel sounds. No pulsations and no  masses felt, No guarding or rebound.   URINARY: No monsivais catheter   EXTREMITIES: No cyanosis, clubbing or edema noted at this time, no calf tenderness bilaterally.    PERIPHERAL VASCULAR SYSTEM: Good palpable distal pulses.   CENTRAL NERVOUS SYSTEM: No focal motor or sensory deficits noted.   SKIN: Skin without lesions, moist, well perfused.   MUSCLE STRENGTH & TONE: No noteable weakness, atrophy or abnormal movement. Brief R plantarflexion contracture    HOME MEDICATIONS:  Current Facility-Administered Medications on File Prior to Encounter   Medication Dose Route Frequency Provider Last Rate Last Admin    electrolyte-S (ISOLYTE)   Intravenous Continuous Nilay Jeffries MD 10 mL/hr at 09/17/20 0950 New Bag at 09/17/20 1155    [DISCONTINUED] lactated ringers infusion   Intravenous Continuous Nilay Jeffries MD         Current Outpatient Medications on File Prior to Encounter   Medication Sig Dispense Refill    amLODIPine (NORVASC) 2.5 MG tablet Take 1 tablet by mouth once daily 90 tablet 0    carboxymethylcellulose sodium (REFRESH OPHT) Apply 1 drop to eye daily as needed.      conjugated estrogens (PREMARIN) vaginal cream Place 0.5 g vaginally once daily AND 0.5 g twice a week. (Patient taking differently: Place 0.5 g vaginally once daily AND 0.5 g twice a week. Saturdays and tuesdays) 30 g 7    doxepin (SINEQUAN) 10 MG capsule Take 10 mg by mouth every evening.      ergocalciferol (ERGOCALCIFEROL) 50,000 unit Cap Take 1 capsule (50,000 Units total) by mouth every 30 days. 3 capsule 3    gabapentin (NEURONTIN) 300 MG capsule Take 1 capsule (300 mg total) by mouth 3 (three) times daily. 90 capsule 0    levothyroxine (SYNTHROID) 112 MCG tablet Take 1 tablet (112 mcg total) by mouth before breakfast. 30 tablet 11    LINZESS 290 mcg Cap capsule Take 290 mcg by mouth once daily. prn      losartan (COZAAR) 100 MG tablet Take 1 tablet (100 mg total) by mouth once daily. 90 tablet 3    ondansetron (ZOFRAN) 4 MG  tablet Take 1 tablet (4 mg total) by mouth every 12 (twelve) hours as needed for Nausea. 30 tablet 0    potassium chloride (KLOR-CON) 10 MEQ TbSR Take 10 mEq by mouth every other day.      rosuvastatin (CRESTOR) 20 MG tablet TAKE 1 TABLET BY MOUTH ONCE DAILY IN THE EVENING 90 tablet 0    simethicone (MYLICON) 125 MG chewable tablet Take 125 mg by mouth every 6 (six) hours as needed for Flatulence.      blood sugar diagnostic (BLOOD GLUCOSE TEST) Strp True Metrix glucose strips check once daily 100 each 3    blood-glucose meter kit True Metrix glucometer check glucose once daily 1 each 0    calcium carbonate (TUMS) 200 mg calcium (500 mg) chewable tablet Chew and swallow 2 tablets (1,000 mg total) by mouth 3 (three) times daily. for 14 days 100 tablet 0    metFORMIN (GLUCOPHAGE-XR) 500 MG ER 24hr tablet       sodium chloride (OCEAN) 0.65 % nasal spray 1 spray by Nasal route as needed for Congestion.      tiZANidine (ZANAFLEX) 4 MG tablet Take 4 mg by mouth every 6 (six) hours as needed.         SCHEDULED MEDS:   amLODIPine  10 mg Oral Daily    aspirin  81 mg Oral Daily    enoxaparin  40 mg Subcutaneous Daily    gabapentin  300 mg Oral TID    levothyroxine  112 mcg Oral Before breakfast    pantoprazole  40 mg Intravenous Daily    pravastatin  80 mg Oral QHS       CONTINUOUS INFUSIONS:   lactated ringers 75 mL/hr at 03/22/22 0848       PRN MEDS:dextrose 50%, dextrose 50%, insulin regular, nitroGLYCERIN, ondansetron, simethicone, sodium chloride 0.9%    LABS AND DIAGNOSTICS     CBC LAST 3 DAYS  Recent Labs   Lab 03/16/22  0325 03/21/22  1020 03/22/22  0518   WBC 6.90 5.08 4.67   RBC 4.02 4.22 4.12   HGB 11.4* 11.7* 11.6*   HCT 36.3* 37.7 37.9   MCV 90 89 92   MCH 28.4 27.7 28.2   MCHC 31.4* 31.0* 30.6*   RDW 14.3 13.7 14.0    231 268   MPV 8.8* 8.6* 9.3   GRAN 67.3  4.6 74.6*  3.8 63.9  3.0   LYMPH 23.2  1.6 17.1*  0.9* 24.8  1.2   MONO 7.8  0.5 7.1  0.4 9.2  0.4   BASO 0.03 0.03 0.03    NRBC 0 0 0       COAGULATION LAST 3 DAYS  Recent Labs   Lab 03/21/22  1020   LABPT 13.1   INR 1.1   APTT 31.8       CHEMISTRY LAST 3 DAYS  Recent Labs   Lab 03/16/22  0325 03/21/22  1020 03/22/22  0518    140 139   K 4.6 3.6 3.9    102 103   CO2 26 28 30*   ANIONGAP 11 10 6*   BUN 26* 21 16   CREATININE 1.1 0.9 0.9   * 107 104   CALCIUM 8.1* 8.2* 7.8*   MG  --  1.9  --    ALBUMIN 3.4* 4.0  --    PROT 7.0 7.3  --    ALKPHOS 39* 38*  --    ALT 21 21  --    AST 19 18  --    BILITOT 0.4 0.7  --        CARDIAC PROFILE LAST 3 DAYS  Recent Labs   Lab 03/21/22  1020 03/21/22  1819 03/21/22  2228 03/22/22  0901   BNP 29  --   --   --      --   --   --    TROPONINI <0.030 <0.030 <0.030 <0.030       ENDOCRINE LAST 3 DAYS  Recent Labs   Lab 03/16/22  0325 03/21/22  1020   TSH <0.010* 0.010*       LAST ARTERIAL BLOOD GAS  ABG  No results for input(s): PH, PO2, PCO2, HCO3, BE in the last 168 hours.    LAST 7 DAYS MICROBIOLOGY   Microbiology Results (last 7 days)     Procedure Component Value Units Date/Time    Blood culture [[id number]] Collected: 03/21/22 1818    Order Status: Completed Specimen: Blood from Antecubital, Right Arm Updated: 03/22/22 0117     Blood Culture, Routine No Growth to date    Blood culture [[id number]] Collected: 03/21/22 1818    Order Status: Completed Specimen: Blood from Antecubital, Right Arm Updated: 03/22/22 0117     Blood Culture, Routine No Growth to date    Urine Culture High Risk [[id number]]     Order Status: No result Specimen: Urine, Clean Catch           MOST RECENT IMAGING  US Lower Extremity Veins Bilateral  REASON: Elevated d-dimer.    FINDINGS:    Grayscale, color and spectral Doppler analysis of the bilateral lower extremity deep venous system was performed.    There is normal compressibility, color and spectral Doppler analysis, and augmentation in the bilateral lower extremity deep venous system.    IMPRESSION:    No DVT of the bilateral lower extremity  veins.    Electronically signed by:  Adriel Oneill   3/21/2022 12:52 PM CDT Workstation: [id number]  CT Abdomen Pelvis With Contrast  All CT scans at this facility use dose modulation, degenerative reconstruction  and/or weight-based dosing when appropriate to reduce radiation dose to as low as reasonably achievable.    CT of the abdomen with contrast and CT of the pelvis with contrast    HISTORY: Epigastric abdominal pain.    The study utilizes 100 cc of intravenous nonionic contrast material.    Comparison is made to 3/16/2022 and 6/9/2020.    There is mild motion degradation.    The liver and spleen are normal in size and demonstrate no focal parenchymal abnormalities. The gallbladder is surgically absent. There is no biliary dilatation. The pancreas and adrenal glands are unremarkable.    The kidneys are normal in size and position. There is mild asymmetric prominence of the right renal pelvis and intrarenal collecting structures which may relate to mild chronic UPJ obstruction, similar to prior studies. Probable subcentimeter cysts are observed within the mid to upper cortex of the left kidney.    There are scattered atheromatous changes in the wall of the aorta and branches without aneurysmal dilatation.    There are numerous colonic diverticuli without evidence of acute diverticulitis. Retained fecal material is observed within the colon. The appendix is unremarkable.    There is no adenopathy or significant free fluid. There has been a previous hysterectomy. There are no adnexal masses. The urinary bladder is incompletely distended but grossly unremarkable.    There are no acute osseous abnormalities demonstrated.    IMPRESSION:    Mild dilatation of the right intrarenal collecting structures and right renal pelvis which may relate to mild chronic right UPJ obstruction and appears similar to prior studies.    Scattered colonic diverticuli and retained colonic fecal material.    Additional observations as  above.    Electronically signed by:  Bre Coreas MD  3/21/2022 11:59 AM CDT Workstation: [id number]  CTA Chest Non-Coronary (PE Study)  CMS MANDATED QUALITY DATA - CT RADIATION - 436    All CT scans at this facility utilize dose modulation, iterative reconstruction, and/or weight based dosing when appropriate to reduce radiation dose to as low as reasonably achievable.    Reason: Pulmonary embolism (PE) suspected, positive D-dimer    TECHNIQUE: CT angiography of thorax with 100 mL Omnipaque 350.  Maximum intensity projection coronal reformations were created at a separate workstation and stored in the patient's permanent medical record.    COMPARISON: None    CTA THORAX:  Negative for pulmonary embolus. Thoracic aorta is of normal caliber and without dissection.    Postsurgical change of right upper lobectomy is evident. Subpleural groundglass opacities in dependent lower lobes bilaterally suggest minor atelectasis, and lungs are otherwise clear. Trachea and main bronchi are patent.    No enlarged hilar, mediastinal, or axillary lymph nodes. No pleural or pericardial effusion.    Surgical clips near gallbladder fossa suggest cholecystectomy.    Surgical clips also occur in the neck. Fluid density in left neck suggest postsurgical change, incompletely visualized. Degenerative changes affect the spine.    IMPRESSION:  Negative for pulmonary embolus or other acute thoracic abnormality.    Electronically signed by:  Alex Madsen MD  3/21/2022 11:53 AM CDT Workstation: [id number]  X-Ray Chest 1 View  Reason: Abd pain    FINDINGS:  Portable chest at 1001 compared with 10/15/2021 shows normal cardiomediastinal silhouette.    Slight elevation or eventration right diaphragm unchanged. Lungs are otherwise clear. Pulmonary vasculature is normal. No acute osseous abnormality. Surgical clips are noted in paratracheal location near the thoracic inlet. Right upper quadrant surgical clips suggest  cholecystectomy.    IMPRESSION:  No acute cardiopulmonary abnormality.    Electronically signed by:  Alex Madsen MD  3/21/2022 10:21 AM CDT Workstation: [id number]      ECHOCARDIOGRAM RESULTS (last 5)  Results for orders placed in visit on 12/03/19    Echo    Interpretation Summary  · Concentric left ventricular remodeling.  · Increased (hyperdynamic) left ventricular systolic function. The estimated ejection fraction is 70%  · Normal LV diastolic function.  · Normal right ventricular systolic function.  · There is mild leaflet calcification of the Mitral Valve.  · Normal central venous pressure (3 mm Hg).  · The estimated PA systolic pressure is 20 mm Hg  · Since previous echo of 12/22/2017, there is no significant change. Atrial septal aneurysm is still noted.      CURRENT/PREVIOUS VISIT EKG  Results for orders placed or performed during the hospital encounter of 03/21/22   EKG 12-lead    Collection Time: 03/22/22  8:36 AM    Narrative    Test Reason : R07.9,    Vent. Rate : 108 BPM     Atrial Rate : 108 BPM     P-R Int : 146 ms          QRS Dur : 080 ms      QT Int : 364 ms       P-R-T Axes : 054 027 052 degrees     QTc Int : 487 ms    Sinus tachycardia  Otherwise normal ECG  When compared with ECG of 21-MAR-2022 10:03,  No significant change was found    Referred By: [email or web address]   SELF           Confirmed By:            ASSESSMENT/PLAN:     There are no active hospital problems to display for this patient.      ASSESSMENT & PLAN:   1. Chest pain  2. Essential hypertension  3. Epigastric pain, history of gastritis  4. Rectal prolapse   5. Elevated D-dimer of 3      RECOMMENDATIONS:  CTA negative for PE  CT of abdomen with no acute pathology  Echo and Lexiscan stress test has been ordered by hospital medicine. Delay stress test to tomorrow morning  Troponin lvl remains normal. Symptoms improved with nitro.   Blood pressure is well controlled on amlodipine 10 mg and home losartan 100 mg      Lucero Morgan  BOBBY  [facility]  Department of Cardiology  Date of Service: 03/22/2022

## 2022-03-22 NOTE — PROGRESS NOTES
[facility] Medicine  Progress Note    Patient name: Erica Masterson  MRN: [id number]  Admit Date: 3/21/2022   LOS: 0 days     SUBJECTIVE:     Principal problem: Chest pain    Interval History:  Continues complain of chest tightness, rated 6/10, associated with nausea without emesis. Complains of bilateral lower extremity tightness as well. Nausea and chest tightness improved with nitroglycerin sublingual. Transfer to cardiology. Case discussed with Dr. Onofre.    Scheduled Meds:   amLODIPine  10 mg Oral Daily    aspirin  81 mg Oral Daily    enoxaparin  40 mg Subcutaneous Daily    gabapentin  300 mg Oral TID    levothyroxine  112 mcg Oral Before breakfast    pantoprazole  40 mg Intravenous Daily    pravastatin  80 mg Oral QHS     Continuous Infusions:   lactated ringers 75 mL/hr at 03/22/22 0848     PRN Meds:dextrose 50%, dextrose 50%, insulin regular, nitroGLYCERIN, ondansetron, simethicone, sodium chloride 0.9%    Review of patient's allergies indicates:  No Known Allergies    Review of Systems  As per subjective    OBJECTIVE:     Vital Signs (Most Recent)  Temp: 98 °F (36.7 °C) (03/22/22 1049)  Pulse: 93 (03/22/22 1049)  Resp: 18 (03/22/22 1049)  BP: 130/61 (03/22/22 1049)  SpO2: 95 % (03/22/22 1049)    Vital Signs Range (Last 24H):  Temp:  [97.7 °F (36.5 °C)-98.5 °F (36.9 °C)]   Pulse:  []   Resp:  [14-20]   BP: (120-168)/(61-77)   SpO2:  [95 %-100 %]     I & O (Last 24H):No intake or output data in the 24 hours ending 03/22/22 1445    Physical Exam:  General: Patient resting comfortably in no acute distress. Appears as stated age. Calm  Eyes: EOM intact. No conjunctivae injection. No scleral icterus.  ENT: Hearing grossly intact. No discharge from ears. No nasal discharge.   CVS: RRR. No LE edema BL.  Lungs: CTA BL, no wheezing or crackles. Good breath sounds. No accessory muscle use. No acute respiratory distress  Neuro: Alert. Cranial nerves grossly intact. Moves all  extremities equally. Follows commands. Responds appropriately   Psych: Mood, behavior, thought content and judgement normal    Laboratory:  All pertinent labs within the past 24 hours have been reviewed.  CBC:   Recent Labs   Lab 03/21/22  1020 03/22/22  0518   WBC 5.08 4.67   HGB 11.7* 11.6*   HCT 37.7 37.9    268     CMP:   Recent Labs   Lab 03/21/22  1020 03/22/22  0518    139   K 3.6 3.9    103   CO2 28 30*    104   BUN 21 16   CREATININE 0.9 0.9   CALCIUM 8.2* 7.8*   PROT 7.3  --    ALBUMIN 4.0  --    BILITOT 0.7  --    ALKPHOS 38*  --    AST 18  --    ALT 21  --    ANIONGAP 10 6*   EGFRNONAA >60.0 >60.0       Microbiology Results (last 7 days)     Procedure Component Value Units Date/Time    Blood culture [[id number]] Collected: 03/21/22 1818    Order Status: Completed Specimen: Blood from Antecubital, Right Arm Updated: 03/22/22 0117     Blood Culture, Routine No Growth to date    Blood culture [[id number]] Collected: 03/21/22 1818    Order Status: Completed Specimen: Blood from Antecubital, Right Arm Updated: 03/22/22 0117     Blood Culture, Routine No Growth to date    Urine Culture High Risk [[id number]]     Order Status: No result Specimen: Urine, Clean Catch            Diagnostic Results:      ASSESSMENT/PLAN:     Active Hospital Problems    Diagnosis  POA    *Chest pain [R07.9]  Yes    Postoperative hypothyroidism [E89.0]  Yes     Chronic    Thyroid cancer [C73]  Yes     Chronic    RONNIE (generalized anxiety disorder), 2019 [F41.1]  Yes     Chronic    Stage 3 chronic kidney disease [N18.30]  Yes     Chronic    Type 2 diabetes mellitus, without long-term current use of insulin, dx 3/2020 [E11.9]  Yes     Chronic    PAD (peripheral artery disease) [I73.9]  Yes     Chronic    Hypertension associated with diabetes [E11.59, I15.2]  Yes     Chronic    Depression [F32.A]  Yes     Chronic    Hyperlipidemia associated with type 2 diabetes mellitus, baseline  [E11.69, E78.5]   Yes     Chronic    Diverticulosis [K57.90]  Yes     Chronic      Resolved Hospital Problems   No resolved problems to display.           Plan:   CTA chest, lower extremity US vein unremarkable  CT abdomen with contrast unremarkable  US BL LE arterial ordered  Consider CTA abdomen and pelvis if US unremarkable  STAT troponin, EKG ordered  Serial cardiac enzymes unremarkable  Nuclear stress test ordered  Transfer to cardio  Continue home medications    Cardiology consult      VTE Risk Mitigation (From admission, onward)         Ordered     enoxaparin injection 40 mg  Daily         03/21/22 1436     IP VTE HIGH RISK PATIENT  Once         03/21/22 1436     Place sequential compression device  Until discontinued         03/21/22 1436                        Patient care time was spent personally by me on the following activities: > 35 min  · Obtaining a history.  · Examination of patient.  · Providing medical care at the patients bedside.  · Developing a treatment plan with patient or surrogate and bedside caregivers.  · Ordering and reviewing laboratory studies, radiographic studies, pulse oximetry.  · Ordering and performing treatments and interventions.  · Evaluation of patient's response to treatment.  · Discussions with consultants while on the unit and immediately available to the patient.  · Re-evaluation of the patient's condition.  · Documentation in the medical record.       Department Hospital Medicine  [facility]  Dustin Mcmahon MD    Please note: This note was transcribed using voice recognition software. Because of this technology, there are often uinintended grammatical, spelling, and other transcription errors. Please disregard these errors.

## 2022-03-23 ENCOUNTER — HOSPITAL ENCOUNTER (OUTPATIENT)
Dept: RADIOLOGY | Facility: HOSPITAL | Age: 77
Discharge: HOME | End: 2022-03-23
Attending: FAMILY MEDICINE
Payer: MEDICARE

## 2022-03-23 ENCOUNTER — CLINICAL SUPPORT (OUTPATIENT)
Dept: CARDIOLOGY | Facility: HOSPITAL | Age: 77
End: 2022-03-23
Attending: FAMILY MEDICINE
Payer: MEDICARE

## 2022-03-23 VITALS
DIASTOLIC BLOOD PRESSURE: 56 MMHG | RESPIRATION RATE: 18 BRPM | SYSTOLIC BLOOD PRESSURE: 114 MMHG | WEIGHT: 148.81 LBS | TEMPERATURE: 98 F | BODY MASS INDEX: 24.79 KG/M2 | HEART RATE: 75 BPM | OXYGEN SATURATION: 94 % | HEIGHT: 65 IN

## 2022-03-23 PROBLEM — R07.9 CHEST PAIN: Status: RESOLVED | Noted: 2022-03-22 | Resolved: 2022-03-23

## 2022-03-23 LAB
ANION GAP SERPL CALC-SCNC: 9 MMOL/L (ref 8–16)
BUN SERPL-MCNC: 23 MG/DL (ref 8–23)
CALCIUM SERPL-MCNC: 8.2 MG/DL (ref 8.7–10.5)
CHLORIDE SERPL-SCNC: 101 MMOL/L (ref 95–110)
CO2 SERPL-SCNC: 31 MMOL/L (ref 23–29)
CREAT SERPL-MCNC: 1 MG/DL (ref 0.5–1.4)
CV STRESS BASE HR: 81 BPM
DIASTOLIC BLOOD PRESSURE: 58 MMHG
EST. GFR  (AFRICAN AMERICAN): >60 ML/MIN/1.73 M^2
EST. GFR  (NON AFRICAN AMERICAN): 54.1 ML/MIN/1.73 M^2
GLUCOSE SERPL-MCNC: 114 MG/DL (ref 70–110)
GLUCOSE SERPL-MCNC: 118 MG/DL (ref 70–110)
GLUCOSE SERPL-MCNC: 157 MG/DL (ref 70–110)
OHS CV CPX 1 MINUTE RECOVERY HEART RATE: 114 BPM
OHS CV CPX 85 PERCENT MAX PREDICTED HEART RATE MALE: 117
OHS CV CPX MAX PREDICTED HEART RATE: 137
OHS CV CPX PATIENT IS FEMALE: 1
OHS CV CPX PATIENT IS MALE: 0
OHS CV CPX PEAK DIASTOLIC BLOOD PRESSURE: 68 MMHG
OHS CV CPX PEAK HEAR RATE: 123 BPM
OHS CV CPX PEAK RATE PRESSURE PRODUCT: NORMAL
OHS CV CPX PEAK SYSTOLIC BLOOD PRESSURE: 150 MMHG
OHS CV CPX PERCENT MAX PREDICTED HEART RATE ACHIEVED: 90
OHS CV CPX RATE PRESSURE PRODUCT PRESENTING: 9720
POTASSIUM SERPL-SCNC: 4.3 MMOL/L (ref 3.5–5.1)
SODIUM SERPL-SCNC: 141 MMOL/L (ref 136–145)
SYSTOLIC BLOOD PRESSURE: 120 MMHG
T3FREE SERPL-MCNC: 2 PG/ML (ref 2–4.4)
T3FREE SERPL-MCNC: 2.1 PG/ML (ref 2–4.4)

## 2022-03-23 PROCEDURE — G0378 HOSPITAL OBSERVATION PER HR: HCPCS

## 2022-03-23 PROCEDURE — 93016 NUCLEAR STRESS TEST (CUPID ONLY): ICD-10-PCS | Mod: ,,, | Performed by: INTERNAL MEDICINE

## 2022-03-23 PROCEDURE — 93018 NUCLEAR STRESS TEST (CUPID ONLY): ICD-10-PCS | Mod: ,,, | Performed by: INTERNAL MEDICINE

## 2022-03-23 PROCEDURE — 80048 BASIC METABOLIC PNL TOTAL CA: CPT | Performed by: INTERNAL MEDICINE

## 2022-03-23 PROCEDURE — 25000003 PHARM REV CODE 250: Performed by: FAMILY MEDICINE

## 2022-03-23 PROCEDURE — 99224 PR SUBSEQUENT OBSERVATION CARE,LEVEL I: ICD-10-PCS | Mod: ,,, | Performed by: SPECIALIST

## 2022-03-23 PROCEDURE — A9502 TC99M TETROFOSMIN: HCPCS

## 2022-03-23 PROCEDURE — 93017 CV STRESS TEST TRACING ONLY: CPT

## 2022-03-23 PROCEDURE — 63600175 PHARM REV CODE 636 W HCPCS: Performed by: FAMILY MEDICINE

## 2022-03-23 PROCEDURE — 96376 TX/PRO/DX INJ SAME DRUG ADON: CPT

## 2022-03-23 PROCEDURE — 25000003 PHARM REV CODE 250: Performed by: INTERNAL MEDICINE

## 2022-03-23 PROCEDURE — 97162 PT EVAL MOD COMPLEX 30 MIN: CPT

## 2022-03-23 PROCEDURE — 63600175 PHARM REV CODE 636 W HCPCS: Performed by: INTERNAL MEDICINE

## 2022-03-23 PROCEDURE — C9113 INJ PANTOPRAZOLE SODIUM, VIA: HCPCS | Performed by: INTERNAL MEDICINE

## 2022-03-23 PROCEDURE — 93016 CV STRESS TEST SUPVJ ONLY: CPT | Mod: ,,, | Performed by: INTERNAL MEDICINE

## 2022-03-23 PROCEDURE — 36415 COLL VENOUS BLD VENIPUNCTURE: CPT | Performed by: INTERNAL MEDICINE

## 2022-03-23 PROCEDURE — 99224 PR SUBSEQUENT OBSERVATION CARE,LEVEL I: CPT | Mod: ,,, | Performed by: SPECIALIST

## 2022-03-23 PROCEDURE — 93018 CV STRESS TEST I&R ONLY: CPT | Mod: ,,, | Performed by: INTERNAL MEDICINE

## 2022-03-23 RX ORDER — POLYETHYLENE GLYCOL 3350 17 G/17G
17 POWDER, FOR SOLUTION ORAL DAILY
Qty: 30 EACH | Refills: 0 | Status: SHIPPED | OUTPATIENT
Start: 2022-03-23 | End: 2022-08-18

## 2022-03-23 RX ORDER — REGADENOSON 0.08 MG/ML
0.4 INJECTION, SOLUTION INTRAVENOUS
Status: COMPLETED | OUTPATIENT
Start: 2022-03-23 | End: 2022-03-23

## 2022-03-23 RX ADMIN — GABAPENTIN 300 MG: 300 CAPSULE ORAL at 10:03

## 2022-03-23 RX ADMIN — AMLODIPINE BESYLATE 10 MG: 5 TABLET ORAL at 10:03

## 2022-03-23 RX ADMIN — ASPIRIN 81 MG 81 MG: 81 TABLET ORAL at 10:03

## 2022-03-23 RX ADMIN — PANTOPRAZOLE SODIUM 40 MG: 40 INJECTION, POWDER, FOR SOLUTION INTRAVENOUS at 10:03

## 2022-03-23 RX ADMIN — LEVOTHYROXINE SODIUM 112 MCG: 0.11 TABLET ORAL at 05:03

## 2022-03-23 RX ADMIN — REGADENOSON 0.4 MG: 0.08 INJECTION, SOLUTION INTRAVENOUS at 09:03

## 2022-03-23 NOTE — CONSULTS
[facility]  Cardiology  Progress Note    Patient Name: Erica Masterson  MRN: [id number]  Admission Date: 3/21/2022  Hospital Length of Stay: 0 days  Code Status: Full Code   Attending Physician: Dustin Mcmahon MD   Primary Care Physician: Narayan Myers MD  Expected Discharge Date: 3/23/2022  Principal Problem:Chest pain    Subjective:     Hospital Course:  Patient is having musculoskeletal pain and cervical strain pain since of thyroidectomy    Interval History:  Nuclear stress test negative for coronary disease  ROS  Objective:     Vital Signs (Most Recent):  Temp: 98 °F (36.7 °C) (03/23/22 1104)  Pulse: 75 (03/23/22 1104)  Resp: 18 (03/23/22 1104)  BP: (!) 114/56 (03/23/22 1104)  SpO2: (!) 94 % (03/23/22 1104) Vital Signs (24h Range):  Temp:  [97.3 °F (36.3 °C)-98.5 °F (36.9 °C)] 98 °F (36.7 °C)  Pulse:  [69-91] 75  Resp:  [18] 18  SpO2:  [93 %-97 %] 94 %  BP: (102-118)/(56-79) 114/56     Weight: 67.5 kg (148 lb 13 oz)  Body mass index is 24.76 kg/m².    SpO2: (!) 94 %  O2 Device (Oxygen Therapy): room air      Intake/Output Summary (Last 24 hours) at 3/23/2022 1322  Last data filed at 3/22/2022 1700  Gross per 24 hour   Intake 240 ml   Output --   Net 240 ml       Lines/Drains/Airways     Drain  Duration                Closed/Suction Drain 09/13/21 1238 Left Neck Bulb 15 Fr. 191 days          Peripheral Intravenous Line  Duration                Peripheral IV - Single Lumen 03/21/22 1020 20 G Left Antecubital 2 days                Physical Exam tenderness over the trapezius rib and the neck and left shoulder  Heart rates 85  Heart sounds are normal  Lungs are clear    Significant Labs:   CMP   Recent Labs   Lab 03/22/22  0518 03/23/22  0454    141   K 3.9 4.3    101   CO2 30* 31*    114*   BUN 16 23   CREATININE 0.9 1.0   CALCIUM 7.8* 8.2*   ANIONGAP 6* 9   ESTGFRAFRICA >60.0 >60.0   EGFRNONAA >60.0 54.1*   , CBC   Recent Labs   Lab 03/22/22  0518   WBC 4.67   HGB 11.6*   HCT  37.9       and Troponin   Recent Labs   Lab 03/21/22  1819 03/21/22  2228 03/22/22  0901   TROPONINI <0.030 <0.030 <0.030       Significant Imaging:   Assessment and Plan:   Noncardiac pain  Musculoskeletal pain is causing a lot of discomfort  Recommend physical therapy and muscle relaxant as Soma or Flexeril and nonsteroidal Rx as Aleve or Mobic  Follow-up in the office in April  Brief HPI:    Active Diagnoses:    Diagnosis Date Noted POA    PRINCIPAL PROBLEM:  Chest pain [R07.9] 03/22/2022 Yes    Postoperative hypothyroidism [E89.0] 07/29/2021 Yes     Chronic    Thyroid cancer [C73] 05/19/2021 Yes     Chronic    RONNIE (generalized anxiety disorder), 2019 [F41.1] 08/03/2020 Yes     Chronic    Stage 3 chronic kidney disease [N18.30] 08/03/2020 Yes     Chronic    Type 2 diabetes mellitus, without long-term current use of insulin, dx 3/2020 [E11.9] 03/24/2020 Yes     Chronic    PAD (peripheral artery disease) [I73.9] 01/30/2014 Yes     Chronic    Hypertension associated with diabetes [E11.59, I15.2]  Yes     Chronic    Depression [F32.A] 03/26/2012 Yes     Chronic    Hyperlipidemia associated with type 2 diabetes mellitus, baseline  [E11.69, E78.5] 03/26/2012 Yes     Chronic    Diverticulosis [K57.90] 12/14/2011 Yes     Chronic      Problems Resolved During this Admission:       VTE Risk Mitigation (From admission, onward)         Ordered     enoxaparin injection 40 mg  Daily         03/21/22 1436     IP VTE HIGH RISK PATIENT  Once         03/21/22 1436     Place sequential compression device  Until discontinued         03/21/22 1436                Dustin Robles MD  Cardiology  [facility]

## 2022-03-23 NOTE — HOSPITAL COURSE
Patient was admitted for chest pain.  Serial cardiac enzymes unremarkable.  CTA chest, ultrasound vein of lower extremities. Patient nuclear stress test was negative for reversible ischemia.  Patient's symptoms resolved during hospitalization.  Patient was stable discharge to home.    General: Patient resting comfortably in no acute distress. Appears as stated age. Calm  Eyes: EOM intact. No conjunctivae injection. No scleral icterus.  ENT: Hearing grossly intact. No discharge from ears. No nasal discharge.   CVS: RRR. No LE edema BL.  Lungs: CTA BL, no wheezing or crackles. Good breath sounds. No accessory muscle use. No acute respiratory distress  Neuro: Alert. Cranial nerves grossly intact. Moves all extremities equally. Follows commands. Responds appropriately   Psych: Mood, behavior, thought content and judgement normal

## 2022-03-23 NOTE — DISCHARGE SUMMARY
[facility] Medicine  Discharge Summary      Patient Name: Erica Masterson  MRN: [id number]  Patient Class: OP- Observation  Admission Date: 3/21/2022  Hospital Length of Stay: 0 days  Discharge Date and Time: 3/23/2022  3:32 PM  Attending Physician: No att. providers found   Discharging Provider: Dustin Mcmahon MD  Primary Care Provider: Narayan Myers MD      HPI:   Erica Masterson is a 76 y.o. 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 Patel she go see her today because she has been feeling sensation of harm cramps tightness in the epigastric area with radiation to the neck and also shoulders associated with nausea but no vomiting for months now on and off she says it happens especially at nighttime sometimes during the day as well she says it happens every night of after she has been sleeping for about 2 hours she says that sometimes and it radiates to chest and left side of face has numbness aspirin 325 consult carbonate 200 mg, 1st set of EKG troponin he has no signs of acute ischemia she is going to be admitted to [facility].      * No surgery found *      Hospital Course:   Patient was admitted for chest pain.  Serial cardiac enzymes unremarkable.  CTA chest, ultrasound vein of lower extremities. Patient nuclear stress test was negative for reversible ischemia.  Patient's symptoms resolved during hospitalization.  Patient was stable discharge to home.    General: Patient resting comfortably in no acute distress. Appears as stated age. Calm  Eyes: EOM intact. No conjunctivae injection.  No scleral icterus.  ENT: Hearing grossly intact. No discharge from ears. No nasal discharge.   CVS: RRR. No LE edema BL.  Lungs: CTA BL, no wheezing or crackles. Good breath sounds. No accessory muscle use. No acute respiratory distress  Neuro: Alert. Cranial nerves grossly intact. Moves all extremities equally. Follows commands. Responds appropriately   Psych: Mood, behavior, thought content and judgement normal       Goals of Care Treatment Preferences:  Code Status: Full Code      Consults:   Consults (From admission, onward)        Status Ordering Provider     Inpatient consult to Cardiology  Once        Provider:  Ramone Onofre MD    Completed ABDELRAHMAN FIELDS     Inpatient consult to Hospitalist  Once        Provider:  Timmy Ramos MD    Acknowledged THUY CRANE          No new Assessment & Plan notes have been filed under this hospital service since the last note was generated.  Service: Hospital Medicine    Final Active Diagnoses:    Diagnosis Date Noted POA    Postoperative hypothyroidism [E89.0] 07/29/2021 Yes     Chronic    Thyroid cancer [C73] 05/19/2021 Yes     Chronic    RONNIE (generalized anxiety disorder), 2019 [F41.1] 08/03/2020 Yes     Chronic    Stage 3 chronic kidney disease [N18.30] 08/03/2020 Yes     Chronic    Type 2 diabetes mellitus, without long-term current use of insulin, dx 3/2020 [E11.9] 03/24/2020 Yes     Chronic    PAD (peripheral artery disease) [I73.9] 01/30/2014 Yes     Chronic    Hypertension associated with diabetes [E11.59, I15.2]  Yes     Chronic    Depression [F32.A] 03/26/2012 Yes     Chronic    Hyperlipidemia associated with type 2 diabetes mellitus, baseline  [E11.69, E78.5] 03/26/2012 Yes     Chronic    Diverticulosis [K57.90] 12/14/2011 Yes     Chronic      Problems Resolved During this Admission:    Diagnosis Date Noted Date Resolved POA    PRINCIPAL PROBLEM:  Chest pain [R07.9] 03/22/2022 03/23/2022 Yes       Discharged Condition:  stable    Disposition: Home or Self Care    Follow Up:   Follow-up Information     Dustin Robles MD Follow up in 2 week(s).    Specialties: Cardiology, Cardiovascular Disease  Why: Call to schedule appointment, Cardiology follow-up  Contact information:  [address]  [address]  [place] [facility] [address]  [phone]             Narayan Myers MD Follow up.    Specialty: Family Medicine  Why: As needed  Contact information:  [address]  [address]  [place] [address]  [phone]             [facility] Follow up.    Specialty: Emergency Medicine  Why: As needed  Contact information:  [address]  [facility] [id number] [phone]  Additional information:  1st floor           Maryse Rowe MD Follow up.    Specialty: Physical Medicine and Rehabilitation  Why: Call to schedule appointment, chronic neck pain  Contact information:  [address] [place] [address] [address]  [address]  [place] [address]  [phone]                       Patient Instructions:      Diet Cardiac     Notify your health care provider if you experience any of the following:  temperature >100.4     Notify your health care provider if you experience any of the following:  persistent nausea and vomiting or diarrhea     Notify your health care provider if you experience any of the following:  severe uncontrolled pain     Notify your health care provider if you experience any of the following:  redness, tenderness, or signs of infection (pain, swelling, redness, odor or green/yellow discharge around incision site)     Notify your health care provider if you experience any of the following:  difficulty breathing or increased cough     Notify your health care provider if you experience any of the following:  severe persistent headache     Notify your health care provider if you experience any of the following:  worsening rash     Notify your health care provider if you experience any of the following:  persistent  dizziness, light-headedness, or visual disturbances     Notify your health care provider if you experience any of the following:  increased confusion or weakness     Activity as tolerated       Significant Diagnostic Studies: Labs:   CMP   Recent Labs   Lab 03/22/22 0518 03/23/22  0454    141   K 3.9 4.3    101   CO2 30* 31*    114*   BUN 16 23   CREATININE 0.9 1.0   CALCIUM 7.8* 8.2*   ANIONGAP 6* 9   ESTGFRAFRICA >60.0 >60.0   EGFRNONAA >60.0 54.1*    and CBC   Recent Labs   Lab 03/22/22 0518   WBC 4.67   HGB 11.6*   HCT 37.9          Pending Diagnostic Studies:     Procedure Component Value Units Date/Time    Vitamin B1 [[id number]] Collected: 03/22/22 0517    Order Status: Sent Lab Status: In process Updated: 03/22/22 0612    Specimen: Blood          Medications:  Reconciled Home Medications:      Medication List      START taking these medications    polyethylene glycol 17 gram Pwpk  Commonly known as: GLYCOLAX  Take 17 g by mouth once daily.        CHANGE how you take these medications    conjugated estrogens vaginal cream  Commonly known as: PREMARIN  Place 0.5 g vaginally once daily AND 0.5 g twice a week.  What changed: See the new instructions.        CONTINUE taking these medications    amLODIPine 2.5 MG tablet  Commonly known as: NORVASC  Take 1 tablet by mouth once daily     BLOOD GLUCOSE TEST Strp  Generic drug: blood sugar diagnostic  True Metrix glucose strips check once daily     blood-glucose meter kit  True Metrix glucometer check glucose once daily     CAMELIA-GEST ANTACID 200 mg calcium (500 mg) chewable tablet  Generic drug: calcium carbonate  Chew and swallow 2 tablets (1,000 mg total) by mouth 3 (three) times daily. for 14 days     doxepin 10 MG capsule  Commonly known as: SINEQUAN  Take 10 mg by mouth every evening.     ergocalciferol 50,000 unit Cap  Commonly known as: ERGOCALCIFEROL  Take 1 capsule (50,000 Units total) by mouth every 30 days.     gabapentin 300 MG  capsule  Commonly known as: NEURONTIN  Take 1 capsule (300 mg total) by mouth 3 (three) times daily.     levothyroxine 112 MCG tablet  Commonly known as: SYNTHROID  Take 1 tablet (112 mcg total) by mouth before breakfast.     LINZESS 290 mcg Cap capsule  Generic drug: linaCLOtide  Take 290 mcg by mouth once daily. prn     losartan 100 MG tablet  Commonly known as: COZAAR  Take 1 tablet (100 mg total) by mouth once daily.     metFORMIN 500 MG ER 24hr tablet  Commonly known as: GLUCOPHAGE-XR     ondansetron 4 MG tablet  Commonly known as: ZOFRAN  Take 1 tablet (4 mg total) by mouth every 12 (twelve) hours as needed for Nausea.     potassium chloride 10 MEQ Tbsr  Commonly known as: KLOR-CON  Take 10 mEq by mouth every other day.     REFRESH OPHT  Apply 1 drop to eye daily as needed.     rosuvastatin 20 MG tablet  Commonly known as: CRESTOR  TAKE 1 TABLET BY MOUTH ONCE DAILY IN THE EVENING     simethicone 125 MG chewable tablet  Commonly known as: MYLICON  Take 125 mg by mouth every 6 (six) hours as needed for Flatulence.     tiZANidine 4 MG tablet  Commonly known as: ZANAFLEX  Take 4 mg by mouth every 6 (six) hours as needed.        STOP taking these medications    sodium chloride 0.65 % nasal spray  Commonly known as: OCEAN            Indwelling Lines/Drains at time of discharge:   Lines/Drains/Airways     Drain  Duration                Closed/Suction Drain 09/13/21 1238 Left Neck Bulb 15 Fr. 191 days                Time spent on the discharge of patient: 25 minutes         Dustin Mcmahon MD  Department of Hospital Medicine  [facility]

## 2022-03-23 NOTE — PLAN OF CARE
03/23/22 1407   Final Note   Assessment Type Final Discharge Note   Anticipated Discharge Disposition Home   What phone number can be called within the next 1-3 days to see how you are doing after discharge? [phone]   Post-Acute Status   Post-Acute Authorization Other   Other Status No Post-Acute Service Needs   Discharge Delays None known at this time

## 2022-03-23 NOTE — PT/OT/SLP EVAL
Physical Therapy Evaluation and Discharge Note    Patient Name:  Erica Masterson   MRN:  [id number]    Recommendations:     Discharge Recommendations:  home   Discharge Equipment Recommendations: none   Barriers to discharge: None    Assessment:     Erica Masterson is a 76 y.o. female admitted with a medical diagnosis of Chest pain. .  At this time, patient is functioning at their prior level of function and does not require further acute PT services.     Recent Surgery: * No surgery found *      Plan:     During this hospitalization, patient does not require further acute PT services.  Please re-consult if situation changes.      Subjective     Chief Complaint: neck and shoulder pain on Left  Patient/Family Comments/goals: get better, pain relief  Pain/Comfort:  · Pain Rating 1:  (pain is intermittant and not quantified at this time.)  · Location - Side 1: Left  · Location 1: neck  · Pain Addressed 1: Reposition, Distraction    Patients cultural, spiritual, Mormonism conflicts given the current situation:      Living Environment:  Pt lives with her  in  home with 3 steps to enter.  Prior to admission, patients level of function was independent and able to drive self.  Equipment used at home: glucometer.  DME owned (not currently used): none.  Upon discharge, patient will have assistance from .    Objective:     Communicated with RN prior to session.  Patient found sitting edge of bed with peripheral IV upon PT entry to room.    General Precautions: Standard, fall   Orthopedic Precautions:    Braces:     Respiratory Status: Room air    Exams:  · RLE Strength: WFL  · LLE Strength: WFL    Functional Mobility:  · Bed Mobility:     · Supine to Sit: independence  · Sit to Supine: independence  · Transfers:     · Sit to Stand:  independence with no AD  · Gait: x200' without AD no LOB    AM-PAC 6 CLICK MOBILITY  Total Score:24       Therapeutic Activities and Exercises:   Pt was educated on the following: call  light use, importance of OOB activity and functional mobility to negate the negative effects of prolonged bed rest during this hospitalization, safe transfers/ambulation and discharge planning recommendations/options.      AM-PAC 6 CLICK MOBILITY  Total Score:24     Patient left sitting edge of bed with all lines intact, call button in reach and RN\ notified.    GOALS:   Multidisciplinary Problems     Physical Therapy Goals     Not on file                History:     Past Medical History:   Diagnosis Date    Allergy     Anxiety     Cataract     Colon polyp     Degenerative arthritis of knee 4/3/2012    Diverticulosis     GERD (gastroesophageal reflux disease)     History of thyroid cancer 2021    Hyperlipidemia     Hypertension     Multinodular goiter 3/26/2012    Myopathy, unspecified 1/18/2010    Tuberculosis        Past Surgical History:   Procedure Laterality Date    BREAST BIOPSY Left 2015    benign    CHOLECYSTECTOMY      COLONOSCOPY  12/2/15    Dr. Britton, multiple polyps, recheck five years-three years if more than 2 polyps are adenomatous    COLONOSCOPY N/A 12/2/2015    COLONOSCOPY N/A 3/20/2019    Procedure: COLONOSCOPY;  Surgeon: Jose Britton MD;  Location: [facility];  Service: Endoscopy;  Laterality: N/A;    COLONOSCOPY N/A 5/20/2020    Dr. Britton; internal hemorrhoids; diverticulosis; polyps removed; repeat in 3 years    CYSTOSCOPY N/A 8/26/2020    Procedure: CYSTOSCOPY;  Surgeon: Charlotte Walters Jr., MD;  Location: [facility] OR;  Service: Urology;  Laterality: N/A;    DISSECTION OF NECK Left 9/13/2021    Procedure: DISSECTION, NECK;  Surgeon: Ryan Torres MD;  Location: [address];  Service: ENT;  Laterality: Left;    ESOPHAGOGASTRODUODENOSCOPY N/A 5/20/2020    Dr. Britton; small hiatal hernia; gastritis; 8 gastric polyps removed    FOOT SURGERY      HYSTERECTOMY      TVH/BSO > 20 years    INTRALUMINAL GASTROINTESTINAL TRACT IMAGING VIA CAPSULE N/A 6/4/2020     Procedure: IMAGING PROCEDURE, GI TRACT, INTRALUMINAL, VIA CAPSULE;  Surgeon: Jose Britton MD;  Location: [facility] ENDO;  Service: Endoscopy;  Laterality: N/A;    KNEE SURGERY  06/06/2013    right knee tear Dr Iverson     LAPAROSCOPIC CHOLECYSTECTOMY N/A 9/17/2020    Procedure: CHOLECYSTECTOMY, LAPAROSCOPIC;  Surgeon: Forrest Veronica MD;  Location: [facility] OR;  Service: General;  Laterality: N/A;    LUNG LOBECTOMY  1966    right middle lobectomy, due to Tb    OOPHORECTOMY      SHOULDER SURGERY      francis     THYROIDECTOMY Bilateral 5/19/2021    Procedure: THYROIDECTOMY;  Surgeon: Jamia Amezquita MD;  Location: [facility] OR [address] [place] [address];  Service: General;  Laterality: Bilateral;    THYROIDECTOMY Bilateral 9/13/2021    Procedure: THYROIDECTOMY WITH INTRA-OP PTH;  Surgeon: Ryan Torres MD;  Location: [facility] OR [address] [place] [address];  Service: ENT;  Laterality: Bilateral;  NIM tube  Intraop PTH       Time Tracking:     PT Received On: 03/23/22  PT Start Time: 1403     PT Stop Time: 1417  PT Total Time (min): 14 min     Billable Minutes: Evaluation 14      03/23/2022

## 2022-03-23 NOTE — PROGRESS NOTES
[facility]  Department of Cardiology  Progress Note    PATIENT NAME: Erica Masterson  MRN: [id number]  TODAY'S DATE: 03/23/2022  ADMIT DATE: 3/21/2022    SUBJECTIVE     PRINCIPLE PROBLEM: Chest pain    INTERVAL HISTORY:    3/23/2022  Patient again complains of tightness to L neck that would relieve in shoulder flexion position. She suspects this is due to the small distal supraspinatus tendon tear detected on MRI 12/2021. 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 She denies any chest pain or SOB at this time.     Review of patient's allergies indicates:  No Known Allergies    REVIEW OF SYSTEMS  CARDIOVASCULAR: No recent chest pain, palpitations, arm, neck, or jaw pain  RESPIRATORY: No recent fever, cough chills, SOB or congestion  : No blood in the urine  GI: No Nausea, vomiting, constipation, diarrhea, blood, or reflux.  MUSCULOSKELETAL: No myalgias  NEURO: No lightheadedness or dizziness  EYES: No Double vision, blurry, vision or headache     OBJECTIVE     VITAL SIGNS (Most Recent)  Temp: 97.7 °F (36.5 °C) (03/23/22 0300)  Pulse: 86 (03/23/22 0300)  Resp: 18 (03/23/22 0300)  BP: 118/79 (03/23/22 1014)  SpO2: 95 % (03/23/22 0300)    VENTILATION STATUS  Resp: 18 (03/23/22 0300)  SpO2: 95 % (03/23/22 0300)       I & O (Last 24H):    Intake/Output Summary (Last 24 hours) at 3/23/2022 1044  Last data filed at 3/22/2022 1700  Gross per 24 hour   Intake 240 ml   Output --   Net 240 ml       WEIGHTS  Wt Readings from Last 3 Encounters:   03/22/22 1049 67.5 kg (148 lb 13 oz)   03/21/22 1655 67.1 kg (148 lb)   03/21/22 0924 67.1 kg (148 lb)   03/16/22 1326 67.7 kg (149 lb 4 oz)   03/16/22 0250 67.6 kg (149 lb)       PHYSICAL EXAM  CONSTITUTIONAL: Well built, well nourished in no apparent distress  NECK: no carotid bruit, no JVD. Mild cervical tenderness. Tightness/fullness to L trapezius area  LUNGS: CTA  CHEST WALL: no tenderness  HEART: regular rate and rhythm, S1, S2 normal, no  murmur, click, rub or gallop   ABDOMEN: soft, non-tender; bowel sounds normal; no masses,  no organomegaly  EXTREMITIES: Extremities normal, no edema  NEURO: AAO X 3    SCHEDULED MEDS:   amLODIPine  10 mg Oral Daily    aspirin  81 mg Oral Daily    enoxaparin  40 mg Subcutaneous Daily    gabapentin  300 mg Oral TID    levothyroxine  112 mcg Oral Before breakfast    pantoprazole  40 mg Intravenous Daily    pravastatin  80 mg Oral QHS       CONTINUOUS INFUSIONS:    PRN MEDS:dextrose 50%, dextrose 50%, insulin regular, nitroGLYCERIN, ondansetron, simethicone, sodium chloride 0.9%    LABS AND DIAGNOSTICS     CBC LAST 3 DAYS  Recent Labs   Lab 03/21/22  1020 03/22/22  0518   WBC 5.08 4.67   RBC 4.22 4.12   HGB 11.7* 11.6*   HCT 37.7 37.9   MCV 89 92   MCH 27.7 28.2   MCHC 31.0* 30.6*   RDW 13.7 14.0    268   MPV 8.6* 9.3   GRAN 74.6*  3.8 63.9  3.0   LYMPH 17.1*  0.9* 24.8  1.2   MONO 7.1  0.4 9.2  0.4   BASO 0.03 0.03   NRBC 0 0       COAGULATION LAST 3 DAYS  Recent Labs   Lab 03/21/22  1020   LABPT 13.1   INR 1.1   APTT 31.8       CHEMISTRY LAST 3 DAYS  Recent Labs   Lab 03/21/22  1020 03/22/22  0518 03/23/22  0454    139 141   K 3.6 3.9 4.3    103 101   CO2 28 30* 31*   ANIONGAP 10 6* 9   BUN 21 16 23   CREATININE 0.9 0.9 1.0    104 114*   CALCIUM 8.2* 7.8* 8.2*   MG 1.9  --   --    ALBUMIN 4.0  --   --    PROT 7.3  --   --    ALKPHOS 38*  --   --    ALT 21  --   --    AST 18  --   --    BILITOT 0.7  --   --        CARDIAC PROFILE LAST 3 DAYS  Recent Labs   Lab 03/21/22  1020 03/21/22  1819 03/21/22  2228 03/22/22  0901   BNP 29  --   --   --      --   --   --    TROPONINI <0.030 <0.030 <0.030 <0.030       ENDOCRINE LAST 3 DAYS  Recent Labs   Lab 03/21/22  1020   TSH 0.010*       LAST ARTERIAL BLOOD GAS  ABG  No results for input(s): PH, PO2, PCO2, HCO3, BE in the last 168 hours.    LAST 7 DAYS MICROBIOLOGY   Microbiology Results (last 7 days)     Procedure Component Value  Units Date/Time    Blood culture [[id number]] Collected: 03/21/22 1818    Order Status: Completed Specimen: Blood from Antecubital, Right Arm Updated: 03/22/22 2032     Blood Culture, Routine No Growth to date      No Growth to date    Blood culture [[id number]] Collected: 03/21/22 1818    Order Status: Completed Specimen: Blood from Antecubital, Right Arm Updated: 03/22/22 2032     Blood Culture, Routine No Growth to date      No Growth to date    Urine Culture High Risk [[id number]]     Order Status: No result Specimen: Urine, Clean Catch           MOST RECENT IMAGING  NM Myocardial Perfusion Spect Multi Pharmacologic  Narrative: EXAMINATION:  NM MYOCARDIAL PERFUSION SPECT MULTI PHARM    CLINICAL HISTORY:  Chest pain/anginal equiv, high CAD risk, not treadmill candidate;    TECHNIQUE:  SPECT images were acquired after the injection of 10 mCi of Tc-99m Myoview at rest and 26.2 mCi during a cardiac stress after injection of 0.4 mg Lexiscan.  The clinical stress and ECG portion of the study is to be read separately.    COMPARISON:  None    FINDINGS:  Stress SPECT images demonstrate homogenous distribution of the tracer throughout the left ventricle. On the resting images, there is matched homogenous distribution of the tracer throughout the left ventricle.  No reversible perfusion defect.    The gated post-stress images reveal normal wall motion with an estimated LVEF of 80 %.  Impression: 1. Scintigraphically negative for ischemia.  2. Normal left ventricular wall motion.  3. Estimated ejection fraction of 80 %.    Electronically signed by: Sami Esqueda MD  Date:    03/23/2022  Time:    10:20      [facility]  Results for orders placed in visit on 12/03/19    Echo    Interpretation Summary  · Concentric left ventricular remodeling.  · Increased (hyperdynamic) left ventricular systolic function. The estimated ejection fraction is 70%  · Normal LV diastolic function.  · Normal right ventricular systolic function.  ·  There is mild leaflet calcification of the Mitral Valve.  · Normal central venous pressure (3 mm Hg).  · The estimated PA systolic pressure is 20 mm Hg  · Since previous echo of 12/22/2017, there is no significant change. Atrial septal aneurysm is still noted.      CURRENT/PREVIOUS VISIT EKG  Results for orders placed or performed during the hospital encounter of 03/21/22   EKG 12-lead    Collection Time: 03/22/22  8:36 AM    Narrative    Test Reason : R07.9,    Vent. Rate : 108 BPM     Atrial Rate : 108 BPM     P-R Int : 146 ms          QRS Dur : 080 ms      QT Int : 364 ms       P-R-T Axes : 054 027 052 degrees     QTc Int : 487 ms    Sinus tachycardia  Otherwise normal ECG  When compared with ECG of 21-MAR-2022 10:03,  No significant change was found  Confirmed by Sundar Clemens MD ([id number]) on 3/22/2022 6:42:25 PM    Referred By: ENEDINA   SELF           Confirmed By:Sundar Clemens MD       ASSESSMENT/PLAN:     Active Hospital Problems    Diagnosis    *Chest pain    Postoperative hypothyroidism    Thyroid cancer    RONNIE (generalized anxiety disorder), 2019    Stage 3 chronic kidney disease    Type 2 diabetes mellitus, without long-term current use of insulin, dx 3/2020    PAD (peripheral artery disease)    Hypertension associated with diabetes    Depression    Hyperlipidemia associated with type 2 diabetes mellitus, baseline     Diverticulosis       ASSESSMENT & PLAN:   1. Chest pain  2. Essential hypertension  3. Epigastric pain, history of gastritis  4. Rectal prolapse   5. Elevated D-dimer of 3        RECOMMENDATIONS:  Undergo stress test today  No chest pain or SOB since receiving her first nitro yesterday morning.  Neck tightness/L shoulder pain is likely secondary to spinal accessory nerve injury. PT consult   Use Bengay or Voltaren type gel topically to area  Cont amlodipine 10 mg and home losartan 100 mg    Lucero Morgan PA-C  [facility]  Department of Cardiology  Date of  Service: 03/23/2022      I have personally interviewed and examined the patient.  I have reviewed all the Physician Assistant's documentation, and agree with the plan.       Dustin Robles M.D.  [facility]  Department of Cardiology  Date of Service: 03/23/2022  10:44 AM

## 2022-03-23 NOTE — PROGRESS NOTES
@ 09:00 PATIENT INJECTED WITH MYOVIEW IV FOR STRESS IMAGES.    LEXISCAN STRESS            RELEASE NPO STATUS FROM NUCLEAR MEDICINE.

## 2022-03-23 NOTE — HPI
Erica Masterson is a 76 y.o. 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 Patel she go see her today because she has been feeling sensation of harm cramps tightness in the epigastric area with radiation to the neck and also shoulders associated with nausea but no vomiting for months now on and off she says it happens especially at nighttime sometimes during the day as well she says it happens every night of after she has been sleeping for about 2 hours she says that sometimes and it radiates to chest and left side of face has numbness aspirin 325 consult carbonate 200 mg, 1st set of EKG troponin he has no signs of acute ischemia she is going to be admitted to [facility].

## 2022-03-23 NOTE — NURSING
Pt discharged to home at this time- a&ox4, no distress to note. Discharge teaching and paperwork provided; pt verbalizes understanding of d/c instructions.

## 2022-03-24 ENCOUNTER — TELEPHONE (OUTPATIENT)
Dept: ENDOCRINOLOGY | Facility: CLINIC | Age: 77
End: 2022-03-24
Payer: MEDICARE

## 2022-03-24 DIAGNOSIS — E89.0 POST-SURGICAL HYPOTHYROIDISM: ICD-10-CM

## 2022-03-24 RX ORDER — LEVOTHYROXINE SODIUM 100 UG/1
100 TABLET ORAL
Qty: 30 TABLET | Refills: 11 | Status: SHIPPED | OUTPATIENT
Start: 2022-03-24 | End: 2022-08-02 | Stop reason: SDUPTHER

## 2022-03-24 NOTE — TELEPHONE ENCOUNTER
----- Message from Mariza Arana sent at 3/24/2022  3:05 PM CDT -----  Contact: Pt [phone]  Type: Needs Medical Advice  Who Called:  Pt  Best Call Back Number: [phone]    Additional Information: Pt is calling because her TSH levels are low. She was in the hospital last week. Pls call back and advise.

## 2022-03-24 NOTE — TELEPHONE ENCOUNTER
Please see telephone message. Pt called, she was in hospital last week. States her thyroid levels were low and wants your advice. Can  You look at the labs and advise? thx

## 2022-03-24 NOTE — TELEPHONE ENCOUNTER
Lab Results   Component Value Date    TSH 0.010 (L) 03/21/2022    FREET4 1.22 03/21/2022     Okay to let pt know to decrease to 100 micrograms per day levothyroxine.  Recheck labs in 6-8 weeks.

## 2022-03-26 LAB
BACTERIA BLD CULT: NORMAL
BACTERIA BLD CULT: NORMAL

## 2022-03-27 ENCOUNTER — HOSPITAL ENCOUNTER (EMERGENCY)
Facility: HOSPITAL | Age: 77
Discharge: HOME | End: 2022-03-27
Attending: EMERGENCY MEDICINE
Payer: MEDICARE

## 2022-03-27 VITALS
SYSTOLIC BLOOD PRESSURE: 132 MMHG | WEIGHT: 147 LBS | HEIGHT: 66 IN | DIASTOLIC BLOOD PRESSURE: 70 MMHG | RESPIRATION RATE: 20 BRPM | BODY MASS INDEX: 23.63 KG/M2 | TEMPERATURE: 98 F | HEART RATE: 70 BPM | OXYGEN SATURATION: 99 %

## 2022-03-27 DIAGNOSIS — R10.84 GENERALIZED ABDOMINAL PAIN: Primary | ICD-10-CM

## 2022-03-27 DIAGNOSIS — R10.11 RUQ ABDOMINAL PAIN: ICD-10-CM

## 2022-03-27 LAB
ALBUMIN SERPL BCP-MCNC: 3.9 G/DL (ref 3.5–5.2)
ALP SERPL-CCNC: 41 U/L (ref 55–135)
ALT SERPL W/O P-5'-P-CCNC: 21 U/L (ref 10–44)
ANION GAP SERPL CALC-SCNC: 12 MMOL/L (ref 8–16)
AST SERPL-CCNC: 19 U/L (ref 10–40)
BASOPHILS # BLD AUTO: 0.03 K/UL (ref 0–0.2)
BASOPHILS NFR BLD: 0.6 % (ref 0–1.9)
BILIRUB SERPL-MCNC: 0.6 MG/DL (ref 0.1–1)
BUN SERPL-MCNC: 17 MG/DL (ref 8–23)
CALCIUM SERPL-MCNC: 8.8 MG/DL (ref 8.7–10.5)
CHLORIDE SERPL-SCNC: 103 MMOL/L (ref 95–110)
CO2 SERPL-SCNC: 25 MMOL/L (ref 23–29)
CREAT SERPL-MCNC: 1 MG/DL (ref 0.5–1.4)
DIFFERENTIAL METHOD: ABNORMAL
EOSINOPHIL # BLD AUTO: 0.1 K/UL (ref 0–0.5)
EOSINOPHIL NFR BLD: 1.1 % (ref 0–8)
ERYTHROCYTE [DISTWIDTH] IN BLOOD BY AUTOMATED COUNT: 13.4 % (ref 11.5–14.5)
EST. GFR  (AFRICAN AMERICAN): >60 ML/MIN/1.73 M^2
EST. GFR  (NON AFRICAN AMERICAN): 54 ML/MIN/1.73 M^2
GLUCOSE SERPL-MCNC: 102 MG/DL (ref 70–110)
HCT VFR BLD AUTO: 37.7 % (ref 37–48.5)
HGB BLD-MCNC: 12.1 G/DL (ref 12–16)
IMM GRANULOCYTES # BLD AUTO: 0.01 K/UL (ref 0–0.04)
IMM GRANULOCYTES NFR BLD AUTO: 0.2 % (ref 0–0.5)
LIPASE SERPL-CCNC: 8 U/L (ref 4–60)
LYMPHOCYTES # BLD AUTO: 1 K/UL (ref 1–4.8)
LYMPHOCYTES NFR BLD: 21 % (ref 18–48)
MCH RBC QN AUTO: 29.1 PG (ref 27–31)
MCHC RBC AUTO-ENTMCNC: 32.1 G/DL (ref 32–36)
MCV RBC AUTO: 91 FL (ref 82–98)
MONOCYTES # BLD AUTO: 0.4 K/UL (ref 0.3–1)
MONOCYTES NFR BLD: 7.4 % (ref 4–15)
NEUTROPHILS # BLD AUTO: 3.3 K/UL (ref 1.8–7.7)
NEUTROPHILS NFR BLD: 69.7 % (ref 38–73)
NRBC BLD-RTO: 0 /100 WBC
PLATELET # BLD AUTO: 240 K/UL (ref 150–450)
PMV BLD AUTO: 8.8 FL (ref 9.2–12.9)
POTASSIUM SERPL-SCNC: 4 MMOL/L (ref 3.5–5.1)
PROT SERPL-MCNC: 7.9 G/DL (ref 6–8.4)
RBC # BLD AUTO: 4.16 M/UL (ref 4–5.4)
SODIUM SERPL-SCNC: 140 MMOL/L (ref 136–145)
WBC # BLD AUTO: 4.72 K/UL (ref 3.9–12.7)

## 2022-03-27 PROCEDURE — 96361 HYDRATE IV INFUSION ADD-ON: CPT

## 2022-03-27 PROCEDURE — 93005 ELECTROCARDIOGRAM TRACING: CPT

## 2022-03-27 PROCEDURE — 80053 COMPREHEN METABOLIC PANEL: CPT | Performed by: EMERGENCY MEDICINE

## 2022-03-27 PROCEDURE — 25000003 PHARM REV CODE 250: Performed by: EMERGENCY MEDICINE

## 2022-03-27 PROCEDURE — 96374 THER/PROPH/DIAG INJ IV PUSH: CPT | Mod: 59

## 2022-03-27 PROCEDURE — 25500020 PHARM REV CODE 255: Performed by: EMERGENCY MEDICINE

## 2022-03-27 PROCEDURE — 36415 COLL VENOUS BLD VENIPUNCTURE: CPT | Performed by: EMERGENCY MEDICINE

## 2022-03-27 PROCEDURE — 99285 EMERGENCY DEPT VISIT HI MDM: CPT | Mod: 25

## 2022-03-27 PROCEDURE — 85025 COMPLETE CBC W/AUTO DIFF WBC: CPT | Performed by: EMERGENCY MEDICINE

## 2022-03-27 PROCEDURE — 83690 ASSAY OF LIPASE: CPT | Performed by: EMERGENCY MEDICINE

## 2022-03-27 PROCEDURE — 63600175 PHARM REV CODE 636 W HCPCS: Performed by: EMERGENCY MEDICINE

## 2022-03-27 RX ORDER — ONDANSETRON 2 MG/ML
4 INJECTION INTRAMUSCULAR; INTRAVENOUS
Status: COMPLETED | OUTPATIENT
Start: 2022-03-27 | End: 2022-03-27

## 2022-03-27 RX ADMIN — SODIUM CHLORIDE 1000 ML: 0.9 INJECTION, SOLUTION INTRAVENOUS at 08:03

## 2022-03-27 RX ADMIN — ONDANSETRON 4 MG: 2 INJECTION INTRAMUSCULAR; INTRAVENOUS at 08:03

## 2022-03-27 RX ADMIN — IOHEXOL 75 ML: 350 INJECTION, SOLUTION INTRAVENOUS at 09:03

## 2022-03-27 NOTE — ED PROVIDER NOTES
Chief complaint:  Abdominal Pain (PMH rectal prolapse. This morning, Pt tried to have a BM and then cramps went from abd to throat. Feels like they need to vomit and something is in their throat)      HPI:  Erica Masterson is a 76 y.o. female with hx htn, prior cholex and hysterex presenting with nausea and abdominal pain.  Patient describes acute onset right-sided abdominal pain described as a crampy pain and minimal at present she has experienced intermittently for at least several weeks accompanied by radiation of a burning sensation up into her throat.  She describes is metallic sensation in her mouth at the onset.  She states this is not feel the same as reflux.  When this occurs on a recurrent basis it is not necessarily after eating or related to supine position.  She denies emesis.  She describes less frequent bowel movements of late with no blood in the stools or dark stools.  No change in urination such as urinary frequency, urgency, dysuria, or hematuria.  No radiation or migration of pain.  She has not taken anything for her symptoms.    ROS: As per HPI and below:  No headache, neck pain, syncope, dyspnea, fever, rashes, swelling, dysuria.  The patient/family denies blurry vision, dysphagia, joint swelling, easy bruising.    Review of patient's allergies indicates:  No Known Allergies    Patient's Medications   New Prescriptions    No medications on file   Previous Medications    AMLODIPINE (NORVASC) 2.5 MG TABLET    Take 1 tablet by mouth once daily    BLOOD SUGAR DIAGNOSTIC (BLOOD GLUCOSE TEST) STRP    True Metrix glucose strips check once daily    BLOOD-GLUCOSE METER KIT    True Metrix glucometer check glucose once daily    CALCIUM CARBONATE (TUMS) 200 MG CALCIUM (500 MG) CHEWABLE TABLET    Chew and swallow 2 tablets (1,000 mg total) by mouth 3 (three) times daily. for 14 days    CARBOXYMETHYLCELLULOSE SODIUM (REFRESH OPHT)    Apply 1 drop to eye daily as needed.    CONJUGATED ESTROGENS (PREMARIN)  VAGINAL CREAM    Place 0.5 g vaginally once daily AND 0.5 g twice a week.    DOXEPIN (SINEQUAN) 10 MG CAPSULE    Take 10 mg by mouth every evening.    ERGOCALCIFEROL (ERGOCALCIFEROL) 50,000 UNIT CAP    Take 1 capsule (50,000 Units total) by mouth every 30 days.    GABAPENTIN (NEURONTIN) 300 MG CAPSULE    Take 1 capsule (300 mg total) by mouth 3 (three) times daily.    LEVOTHYROXINE (SYNTHROID) 100 MCG TABLET    Take 1 tablet (100 mcg total) by mouth before breakfast.    LINZESS 290 MCG CAP CAPSULE    Take 290 mcg by mouth once daily. prn    LOSARTAN (COZAAR) 100 MG TABLET    Take 1 tablet (100 mg total) by mouth once daily.    METFORMIN (GLUCOPHAGE-XR) 500 MG ER 24HR TABLET        ONDANSETRON (ZOFRAN) 4 MG TABLET    Take 1 tablet (4 mg total) by mouth every 12 (twelve) hours as needed for Nausea.    POLYETHYLENE GLYCOL (GLYCOLAX) 17 GRAM PWPK    Take 17 g by mouth once daily.    POTASSIUM CHLORIDE (KLOR-CON) 10 MEQ TBSR    Take 10 mEq by mouth every other day.    ROSUVASTATIN (CRESTOR) 20 MG TABLET    TAKE 1 TABLET BY MOUTH ONCE DAILY IN THE EVENING    SIMETHICONE (MYLICON) 125 MG CHEWABLE TABLET    Take 125 mg by mouth every 6 (six) hours as needed for Flatulence.    TIZANIDINE (ZANAFLEX) 4 MG TABLET    Take 4 mg by mouth every 6 (six) hours as needed.   Modified Medications    No medications on file   Discontinued Medications    No medications on file       PMH:  As per HPI and below:  Past Medical History:   Diagnosis Date    Allergy     Anxiety     Cataract     Colon polyp     Degenerative arthritis of knee 4/3/2012    Diverticulosis     GERD (gastroesophageal reflux disease)     History of thyroid cancer 2021    Hyperlipidemia     Hypertension     Multinodular goiter 3/26/2012    Myopathy, unspecified 1/18/2010    Tuberculosis      Past Surgical History:   Procedure Laterality Date    BREAST BIOPSY Left 2015    benign    CHOLECYSTECTOMY      COLONOSCOPY  12/2/15    Dr. Britton, multiple polyps,  recheck five years-three years if more than 2 polyps are adenomatous    COLONOSCOPY N/A 12/2/2015    COLONOSCOPY N/A 3/20/2019    Procedure: COLONOSCOPY;  Surgeon: Jose Britton MD;  Location: [facility] ENDO;  Service: Endoscopy;  Laterality: N/A;    COLONOSCOPY N/A 5/20/2020    Dr. Britton; internal hemorrhoids; diverticulosis; polyps removed; repeat in 3 years    CYSTOSCOPY N/A 8/26/2020    Procedure: CYSTOSCOPY;  Surgeon: Charlotte Walters Jr., MD;  [facility];  Service: Urology;  Laterality: N/A;    DISSECTION OF NECK Left 9/13/2021    Procedure: DISSECTION, NECK;  Surgeon: Ryan Torres MD;  [facility];  Service: ENT;  Laterality: Left;    ESOPHAGOGASTRODUODENOSCOPY N/A 5/20/2020    Dr. Britton; small hiatal hernia; gastritis; 8 gastric polyps removed    FOOT SURGERY      HYSTERECTOMY      TVH/BSO > 20 years    INTRALUMINAL GASTROINTESTINAL TRACT IMAGING VIA CAPSULE N/A 6/4/2020    Procedure: IMAGING PROCEDURE, GI TRACT, INTRALUMINAL, VIA CAPSULE;  Surgeon: Jose Britton MD;  Location: [facility];  Service: Endoscopy;  Laterality: N/A;    KNEE SURGERY  06/06/2013    right knee tear Dr Iverson     LAPAROSCOPIC CHOLECYSTECTOMY N/A 9/17/2020    Procedure: CHOLECYSTECTOMY, LAPAROSCOPIC;  Surgeon: Forrest Veronica MD;  Location: [facility];  Service: General;  Laterality: N/A;    LUNG LOBECTOMY  1966    right middle lobectomy, due to Tb    OOPHORECTOMY      SHOULDER SURGERY      francis     THYROIDECTOMY Bilateral 5/19/2021    Procedure: THYROIDECTOMY;  Surgeon: Jamia Amezquita MD;  Location: [facility] OR 2ND FLR;  Service: General;  Laterality: Bilateral;    THYROIDECTOMY Bilateral 9/13/2021    Procedure: THYROIDECTOMY WITH INTRA-OP PTH;  Surgeon: Ryan Torres MD;  Location: [facility] OR 2ND FLR;  Service: ENT;  Laterality: Bilateral;  NIM tube  Intraop PTH       Social History     Socioeconomic History    Marital status:    Tobacco Use    Smoking status: Never Smoker     Smokeless tobacco: Never Used   Substance and Sexual Activity    Alcohol use: Not Currently     Comment: stopped 2019    Drug use: No    Sexual activity: Not Currently       Family History   Problem Relation Age of Onset    Colon cancer Other     Cancer Mother     Hypertension Mother     Hyperlipidemia Mother     Cancer Brother     Hypertension Father     Emphysema Father     Diabetes Son     Hypertension Son     Alcohol abuse Son     Diabetes Maternal Aunt     Alzheimer's disease Maternal Uncle     Cancer Maternal Grandmother     No Known Problems Daughter     Dementia Sister     Alzheimer's disease Sister     Breast cancer Sister 60    Obesity Paternal Uncle     Prostate cancer Other     Melanoma Neg Hx     Psoriasis Neg Hx     Lupus Neg Hx     Eczema Neg Hx     Amblyopia Neg Hx     Blindness Neg Hx     Cataracts Neg Hx     Glaucoma Neg Hx     Macular degeneration Neg Hx     Retinal detachment Neg Hx     Strabismus Neg Hx     Stroke Neg Hx     Thyroid cancer Neg Hx     Colon polyps Neg Hx     Ulcerative colitis Neg Hx     Stomach cancer Neg Hx     Rectal cancer Neg Hx        Physical Exam:    Vitals:    03/27/22 0647   BP: (!) 149/76   Pulse: 107   Resp:    Temp:      GENERAL:  No apparent distress.  Alert.    HEENT:  Moist mucous membranes.  Normocephalic and atraumatic.    NECK:  No swelling.  Midline trachea.   CARDIOVASCULAR:  Regular rate and rhythm.  2+ radial pulses.  No murmur auscultated.  PULMONARY:  Lungs clear to auscultation bilaterally.  No wheezes, rales, or rhonci.  Unlabored respirations.  ABDOMEN:  Mild right-sided right upper quadrant and right lower quadrant tenderness without voluntary or involuntary guarding.  No distention, masses, palpable hernias.    EXTREMITIES:  Warm and well perfused.  Brisk capillary refill.  No peripheral edema.  NEUROLOGICAL:  Normal mental status.  Appropriate and conversant.    SKIN:  No rashes or ecchymoses.    BACK:   Atraumatic.  No CVA tenderness to palpation.      Labs Reviewed - No data to display    Current Discharge Medication List      CONTINUE these medications which have NOT CHANGED    Details   amLODIPine (NORVASC) 2.5 MG tablet Take 1 tablet by mouth once daily  Qty: 90 tablet, Refills: 0    Associated Diagnoses: Hypertension associated with diabetes      blood sugar diagnostic (BLOOD GLUCOSE TEST) Strp True Metrix glucose strips check once daily  Qty: 100 each, Refills: 3      blood-glucose meter kit True Metrix glucometer check glucose once daily  Qty: 1 each, Refills: 0      calcium carbonate (TUMS) 200 mg calcium (500 mg) chewable tablet Chew and swallow 2 tablets (1,000 mg total) by mouth 3 (three) times daily. for 14 days  Qty: 100 tablet, Refills: 0      carboxymethylcellulose sodium (REFRESH OPHT) Apply 1 drop to eye daily as needed.      conjugated estrogens (PREMARIN) vaginal cream Place 0.5 g vaginally once daily AND 0.5 g twice a week.  Qty: 30 g, Refills: 7    Associated Diagnoses: Vaginal pain; Atrophic vaginitis      doxepin (SINEQUAN) 10 MG capsule Take 10 mg by mouth every evening.      ergocalciferol (ERGOCALCIFEROL) 50,000 unit Cap Take 1 capsule (50,000 Units total) by mouth every 30 days.  Qty: 3 capsule, Refills: 3    Associated Diagnoses: Osteopenia of lumbar spine; Vitamin D insufficiency      gabapentin (NEURONTIN) 300 MG capsule Take 1 capsule (300 mg total) by mouth 3 (three) times daily.  Qty: 90 capsule, Refills: 0    Associated Diagnoses: Spinal accessory nerve disorder      levothyroxine (SYNTHROID) 100 MCG tablet Take 1 tablet (100 mcg total) by mouth before breakfast.  Qty: 30 tablet, Refills: 11    Associated Diagnoses: Post-surgical hypothyroidism      LINZESS 290 mcg Cap capsule Take 290 mcg by mouth once daily. prn      losartan (COZAAR) 100 MG tablet Take 1 tablet (100 mg total) by mouth once daily.  Qty: 90 tablet, Refills: 3    Comments: PHARMACY: note increase to  100mg  Associated Diagnoses: Hypertension, essential      metFORMIN (GLUCOPHAGE-XR) 500 MG ER 24hr tablet       ondansetron (ZOFRAN) 4 MG tablet Take 1 tablet (4 mg total) by mouth every 12 (twelve) hours as needed for Nausea.  Qty: 30 tablet, Refills: 0    Associated Diagnoses: Nausea      polyethylene glycol (GLYCOLAX) 17 gram PwPk Take 17 g by mouth once daily.  Qty: 30 each, Refills: 0      potassium chloride (KLOR-CON) 10 MEQ TbSR Take 10 mEq by mouth every other day.      rosuvastatin (CRESTOR) 20 MG tablet TAKE 1 TABLET BY MOUTH ONCE DAILY IN THE EVENING  Qty: 90 tablet, Refills: 0    Associated Diagnoses: Hyperlipidemia associated with type 2 diabetes mellitus      simethicone (MYLICON) 125 MG chewable tablet Take 125 mg by mouth every 6 (six) hours as needed for Flatulence.      tiZANidine (ZANAFLEX) 4 MG tablet Take 4 mg by mouth every 6 (six) hours as needed.             No orders of the defined types were placed in this encounter.      Imaging Results    None         ED Course as of 03/27/22 1340   Sun Mar 27, 2022   0808 EKG:  Normal sinus rhythm at a rate of 73.  Normal intervals.  Normal axis.  No significant ST or T wave changes suggesting acute ischemia or infarction.   [MR]   1008 Incidental findings listed in CT report reviewed with patient for PCP follow-up within the next month. [MR]      ED Course User Index  [MR] Antony Esteban MD       MDM:    76 y.o. female with intermittent right-sided abdominal pain with burning pain radiating upwards suggestive of gastroesophageal reflux.  Given patient's age and right-sided abdominal pain, I will pursue additional imaging although recurrent nature over the course of months is more suggestive chronic GI process such as gastroesophageal reflux.  I have very low suspicion for aortic dissection.  EKG was reviewed with no sign of acute ischemia or infarction.  I doubt ACS.  I do not think further troponin enzyme assay or provocative testing is  indicated..  Ondansetron given for nausea along with IV fluids.  She has no urinary complaints and I doubt UTI.  CT ordered to exclude acute, life-threatening etiologies such as bowel obstruction, appendicitis, abscess.  Patient is well-appearing with no distress and declines analgesia at this time.    CT without acute process with incidental findings noted to be followed up with PCP.  Patient notably voiding without difficulty and I have very low suspicion for urinary retention.  I do not think Rodriguez catheter other intervention is indicated at this point.  Follow-up with GI for further investigation.  Return precautions reviewed.    Diagnoses:    1. RUQ/RLQ abdominal pain     Antony Esteban MD  03/27/22 5452

## 2022-03-27 NOTE — DISCHARGE INSTRUCTIONS
As we discussed, return to the emergency department for new or worsening symptoms including throwing up blood, fever, or significant worsening abdominal pain.

## 2022-03-28 ENCOUNTER — TELEPHONE (OUTPATIENT)
Dept: REHABILITATION | Facility: HOSPITAL | Age: 77
End: 2022-03-28
Payer: MEDICARE

## 2022-03-28 ENCOUNTER — OFFICE VISIT (OUTPATIENT)
Dept: ORTHOPEDICS | Facility: CLINIC | Age: 77
End: 2022-03-28
Payer: MEDICARE

## 2022-03-28 VITALS — BODY MASS INDEX: 23.63 KG/M2 | RESPIRATION RATE: 18 BRPM | WEIGHT: 147 LBS | HEIGHT: 66 IN

## 2022-03-28 DIAGNOSIS — G52.8 SPINAL ACCESSORY NERVE DISORDER: Primary | ICD-10-CM

## 2022-03-28 DIAGNOSIS — M75.100 ROTATOR CUFF SYNDROME, UNSPECIFIED LATERALITY: ICD-10-CM

## 2022-03-28 DIAGNOSIS — M25.512 LEFT SHOULDER PAIN, UNSPECIFIED CHRONICITY: ICD-10-CM

## 2022-03-28 PROCEDURE — 1159F PR MEDICATION LIST DOCUMENTED IN MEDICAL RECORD: ICD-10-PCS | Mod: CPTII,S$GLB,, | Performed by: ORTHOPAEDIC SURGERY

## 2022-03-28 PROCEDURE — 1125F AMNT PAIN NOTED PAIN PRSNT: CPT | Mod: CPTII,S$GLB,, | Performed by: ORTHOPAEDIC SURGERY

## 2022-03-28 PROCEDURE — 99213 OFFICE O/P EST LOW 20 MIN: CPT | Mod: S$GLB,,, | Performed by: ORTHOPAEDIC SURGERY

## 2022-03-28 PROCEDURE — 99999 PR PBB SHADOW E&M-EST. PATIENT-LVL IV: ICD-10-PCS | Mod: PBBFAC,,, | Performed by: ORTHOPAEDIC SURGERY

## 2022-03-28 PROCEDURE — 1101F PT FALLS ASSESS-DOCD LE1/YR: CPT | Mod: CPTII,S$GLB,, | Performed by: ORTHOPAEDIC SURGERY

## 2022-03-28 PROCEDURE — 1160F RVW MEDS BY RX/DR IN RCRD: CPT | Mod: CPTII,S$GLB,, | Performed by: ORTHOPAEDIC SURGERY

## 2022-03-28 PROCEDURE — 1160F PR REVIEW ALL MEDS BY PRESCRIBER/CLIN PHARMACIST DOCUMENTED: ICD-10-PCS | Mod: CPTII,S$GLB,, | Performed by: ORTHOPAEDIC SURGERY

## 2022-03-28 PROCEDURE — 99213 PR OFFICE/OUTPT VISIT, EST, LEVL III, 20-29 MIN: ICD-10-PCS | Mod: S$GLB,,, | Performed by: ORTHOPAEDIC SURGERY

## 2022-03-28 PROCEDURE — 3288F PR FALLS RISK ASSESSMENT DOCUMENTED: ICD-10-PCS | Mod: CPTII,S$GLB,, | Performed by: ORTHOPAEDIC SURGERY

## 2022-03-28 PROCEDURE — 3288F FALL RISK ASSESSMENT DOCD: CPT | Mod: CPTII,S$GLB,, | Performed by: ORTHOPAEDIC SURGERY

## 2022-03-28 PROCEDURE — 1101F PR PT FALLS ASSESS DOC 0-1 FALLS W/OUT INJ PAST YR: ICD-10-PCS | Mod: CPTII,S$GLB,, | Performed by: ORTHOPAEDIC SURGERY

## 2022-03-28 PROCEDURE — 99999 PR PBB SHADOW E&M-EST. PATIENT-LVL IV: CPT | Mod: PBBFAC,,, | Performed by: ORTHOPAEDIC SURGERY

## 2022-03-28 PROCEDURE — 1125F PR PAIN SEVERITY QUANTIFIED, PAIN PRESENT: ICD-10-PCS | Mod: CPTII,S$GLB,, | Performed by: ORTHOPAEDIC SURGERY

## 2022-03-28 PROCEDURE — 1159F MED LIST DOCD IN RCRD: CPT | Mod: CPTII,S$GLB,, | Performed by: ORTHOPAEDIC SURGERY

## 2022-03-28 NOTE — PROGRESS NOTES
Past Medical History:   Diagnosis Date    Allergy     Anxiety     Cataract     Colon polyp     Degenerative arthritis of knee 4/3/2012    Diverticulosis     GERD (gastroesophageal reflux disease)     History of thyroid cancer 2021    Hyperlipidemia     Hypertension     Multinodular goiter 3/26/2012    Myopathy, unspecified 1/18/2010    Tuberculosis        Past Surgical History:   Procedure Laterality Date    BREAST BIOPSY Left 2015    benign    CHOLECYSTECTOMY      COLONOSCOPY  12/2/15    Dr. Britton, multiple polyps, recheck five years-three years if more than 2 polyps are adenomatous    COLONOSCOPY N/A 12/2/2015    COLONOSCOPY N/A 3/20/2019    Procedure: COLONOSCOPY;  Surgeon: Jose Britton MD;  Location: [facility] ENDO;  Service: Endoscopy;  Laterality: N/A;    COLONOSCOPY N/A 5/20/2020    Dr. Britton; internal hemorrhoids; diverticulosis; polyps removed; repeat in 3 years    CYSTOSCOPY N/A 8/26/2020    Procedure: CYSTOSCOPY;  Surgeon: Charlotte Walters Jr., MD;  Location: [facility];  Service: Urology;  Laterality: N/A;    DISSECTION OF NECK Left 9/13/2021    Procedure: DISSECTION, NECK;  Surgeon: Ryan Torres MD;  [facility] OR [address] [place] [address];  Service: ENT;  Laterality: Left;    ESOPHAGOGASTRODUODENOSCOPY N/A 5/20/2020    Dr. Britton; small hiatal hernia; gastritis; 8 gastric polyps removed    FOOT SURGERY      HYSTERECTOMY      TVH/BSO > 20 years    INTRALUMINAL GASTROINTESTINAL TRACT IMAGING VIA CAPSULE N/A 6/4/2020    Procedure: IMAGING PROCEDURE, GI TRACT, INTRALUMINAL, VIA CAPSULE;  Surgeon: Jose Britton MD;  Location: [facility];  Service: Endoscopy;  Laterality: N/A;    KNEE SURGERY  06/06/2013    right knee tear Dr Iverson     LAPAROSCOPIC CHOLECYSTECTOMY N/A 9/17/2020    Procedure: CHOLECYSTECTOMY, LAPAROSCOPIC;  Surgeon: Forrest Veronica MD;  Location: [facility] OR;  Service: General;  Laterality: N/A;    LUNG LOBECTOMY  1966    right middle lobectomy, due to Tb     OOPHORECTOMY      SHOULDER SURGERY      francis     THYROIDECTOMY Bilateral 5/19/2021    Procedure: THYROIDECTOMY;  Surgeon: Jamia Amezquita MD;  Location: [facility] OR [address] [place] [address];  Service: General;  Laterality: Bilateral;    THYROIDECTOMY Bilateral 9/13/2021    Procedure: THYROIDECTOMY WITH INTRA-OP PTH;  Surgeon: Ryan Torres MD;  Location: [facility] OR [address] [place] [address];  Service: ENT;  Laterality: Bilateral;  NIM tube  Intraop PTH       Current Outpatient Medications   Medication Sig    amLODIPine (NORVASC) 2.5 MG tablet Take 1 tablet by mouth once daily    blood sugar diagnostic (BLOOD GLUCOSE TEST) Strp True Metrix glucose strips check once daily    carboxymethylcellulose sodium (REFRESH OPHT) Apply 1 drop to eye daily as needed.    conjugated estrogens (PREMARIN) vaginal cream Place 0.5 g vaginally once daily AND 0.5 g twice a week. (Patient taking differently: Place 0.5 g vaginally once daily AND 0.5 g twice a week. Saturdays and tuesdays)    doxepin (SINEQUAN) 10 MG capsule Take 10 mg by mouth every evening.    ergocalciferol (ERGOCALCIFEROL) 50,000 unit Cap Take 1 capsule (50,000 Units total) by mouth every 30 days.    gabapentin (NEURONTIN) 300 MG capsule Take 1 capsule (300 mg total) by mouth 3 (three) times daily.    levothyroxine (SYNTHROID) 100 MCG tablet Take 1 tablet (100 mcg total) by mouth before breakfast.    LINZESS 290 mcg Cap capsule Take 290 mcg by mouth once daily. prn    losartan (COZAAR) 100 MG tablet Take 1 tablet (100 mg total) by mouth once daily.    metFORMIN (GLUCOPHAGE-XR) 500 MG ER 24hr tablet     ondansetron (ZOFRAN) 4 MG tablet Take 1 tablet (4 mg total) by mouth every 12 (twelve) hours as needed for Nausea.    polyethylene glycol (GLYCOLAX) 17 gram PwPk Take 17 g by mouth once daily.    potassium chloride (KLOR-CON) 10 MEQ TbSR Take 10 mEq by mouth every other day.    rosuvastatin (CRESTOR) 20 MG tablet TAKE 1 TABLET BY MOUTH ONCE DAILY IN THE EVENING    simethicone (MYLICON)  125 MG chewable tablet Take 125 mg by mouth every 6 (six) hours as needed for Flatulence.    tiZANidine (ZANAFLEX) 4 MG tablet Take 4 mg by mouth every 6 (six) hours as needed.    blood-glucose meter kit True Metrix glucometer check glucose once daily    calcium carbonate (TUMS) 200 mg calcium (500 mg) chewable tablet Chew and swallow 2 tablets (1,000 mg total) by mouth 3 (three) times daily. for 14 days     No current facility-administered medications for this visit.     Facility-Administered Medications Ordered in Other Visits   Medication    electrolyte-S (ISOLYTE)       Review of patient's allergies indicates:   Allergen Reactions    No known drug allergies        Family History   Problem Relation Age of Onset    Colon cancer Other     Cancer Mother     Hypertension Mother     Hyperlipidemia Mother     Cancer Brother     Hypertension Father     Emphysema Father     Diabetes Son     Hypertension Son     Alcohol abuse Son     Diabetes Maternal Aunt     Alzheimer's disease Maternal Uncle     Cancer Maternal Grandmother     No Known Problems Daughter     Dementia Sister     Alzheimer's disease Sister     Breast cancer Sister 60    Obesity Paternal Uncle     Prostate cancer Other     Melanoma Neg Hx     Psoriasis Neg Hx     Lupus Neg Hx     Eczema Neg Hx     Amblyopia Neg Hx     Blindness Neg Hx     Cataracts Neg Hx     Glaucoma Neg Hx     Macular degeneration Neg Hx     Retinal detachment Neg Hx     Strabismus Neg Hx     Stroke Neg Hx     Thyroid cancer Neg Hx     Colon polyps Neg Hx     Ulcerative colitis Neg Hx     Stomach cancer Neg Hx     Rectal cancer Neg Hx        Social History     Socioeconomic History    Marital status:    Tobacco Use    Smoking status: Never Smoker    Smokeless tobacco: Never Used   Substance and Sexual Activity    Alcohol use: Not Currently     Comment: stopped 2019    Drug use: No    Sexual activity: Not Currently       Chief  Complaint:   Chief Complaint   Patient presents with    Left Shoulder - Pain       History of present illness: This is a 76 year-old female who is seen for left shoulder pain.  It started after a thyroidectomy on September 13th.  Patient has a lot of pain in the trapezius and neck area.  Extends around the scapula and into the shoulder.  Patient has not been able to sleep in several months.  She is currently in physical therapy but it is not really helping.  Pain at night is significant.  She had the MRI of her shoulder which was pretty unremarkable as far as pathology.  She had a little arthritis in a small partial-thickness cuff tear but that does not explain her symptoms very well.  The brachial plexus MRI showed some acute changes within the trapezius without obvious brachial plexus injury.  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  This is consistent with her symptoms.  Physical therapy does seem to be helping.  Shoulder pain is decreasing down to a 4/10.  Feels like her range of motion is improving as well.  We gave her a cortisone injection at her last visit.    Review of Systems:      Musculoskeletal:  See HPI        Physical Examination:    Vital Signs:    Vitals:    03/28/22 1319   Resp: 18       Body mass index is 23.73 kg/m².    This a well-developed, well nourished patient in no acute distress.  They are alert and oriented and cooperative to examination.  Pt. walks without an antalgic gait.      Examination of the left shoulder shows no rashes or erythema.  There is some obvious drooping of the left shoulder with some scapular protraction. There are no masses, ecchymosis, or atrophy. The patient has full range of motion in forward flexion, external rotation, and internal rotation to the mid T-spine. The patient has moderately positive impingement signs. - Olympia's test. - Speeds test. Nontender to palpation over a.c. joint.Passive range of motion:  Forward flexion of 180°, external rotation at 90° of 90°, internal rotation of 50°, and external rotation at 0° of 50°. 2+ radial pulse. Intact axillary, radial, median and ulnar sensation. 4 out of 5 resisted forward flexion, external rotation, and negative lift off test.          X-rays: X-rays of the left shoulder is reviewed which show some AC arthritis otherwise normal-appearing shoulder x-ray    MRI of the left shoulder:Tendinopathy and partial thickness articular surface tearing of the distal supraspinatus tendon.  AC joint arthrosis.  Mild osteoarthritic changes involving the glenohumeral joint space.     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  This is consistent with a spinal accessory nerve injury.  We will get her in to a little different kind of physical therapy more for manual therapy.  Also refilled her muscle relaxer.  Follow-up in 2 months.

## 2022-03-28 NOTE — TELEPHONE ENCOUNTER
PT contact pt to follow up concerning 3 ER visits and hospital stay since last tx session. Two ER visits were unrelated to current condition that is being treated at therapy. Pt reported feeling fine despite hospital stay and ER visits. There is a scope procedure scheduled on 4/1/2022; therefore, PT advised to have this done and then possible return to therapy if appropriate. She also has an orthopedic appointment this afternoon, therefore, PT advised pt to see orthopedic first prior to returning to therapy due to history of ER visits as well as hospital stay and to determine if pt is still appropriate to continue with physical therapy at this time. Pt verbalized good understanding.     Diamond Hampton, PT

## 2022-03-29 ENCOUNTER — OFFICE VISIT (OUTPATIENT)
Dept: SURGERY | Facility: CLINIC | Age: 77
End: 2022-03-29
Payer: MEDICARE

## 2022-03-29 VITALS
HEART RATE: 91 BPM | BODY MASS INDEX: 24.63 KG/M2 | WEIGHT: 153.25 LBS | DIASTOLIC BLOOD PRESSURE: 58 MMHG | TEMPERATURE: 98 F | HEIGHT: 66 IN | SYSTOLIC BLOOD PRESSURE: 122 MMHG

## 2022-03-29 DIAGNOSIS — K64.8 HEMORRHOID PROLAPSE: Primary | ICD-10-CM

## 2022-03-29 PROCEDURE — 1160F PR REVIEW ALL MEDS BY PRESCRIBER/CLIN PHARMACIST DOCUMENTED: ICD-10-PCS | Mod: CPTII,S$GLB,, | Performed by: SURGERY

## 2022-03-29 PROCEDURE — 1101F PR PT FALLS ASSESS DOC 0-1 FALLS W/OUT INJ PAST YR: ICD-10-PCS | Mod: CPTII,S$GLB,, | Performed by: SURGERY

## 2022-03-29 PROCEDURE — 46600 DIAGNOSTIC ANOSCOPY SPX: CPT | Mod: S$GLB,,, | Performed by: SURGERY

## 2022-03-29 PROCEDURE — 99214 PR OFFICE/OUTPT VISIT, EST, LEVL IV, 30-39 MIN: ICD-10-PCS | Mod: 25,S$GLB,, | Performed by: SURGERY

## 2022-03-29 PROCEDURE — 1160F RVW MEDS BY RX/DR IN RCRD: CPT | Mod: CPTII,S$GLB,, | Performed by: SURGERY

## 2022-03-29 PROCEDURE — 1159F PR MEDICATION LIST DOCUMENTED IN MEDICAL RECORD: ICD-10-PCS | Mod: CPTII,S$GLB,, | Performed by: SURGERY

## 2022-03-29 PROCEDURE — 1159F MED LIST DOCD IN RCRD: CPT | Mod: CPTII,S$GLB,, | Performed by: SURGERY

## 2022-03-29 PROCEDURE — 1126F AMNT PAIN NOTED NONE PRSNT: CPT | Mod: CPTII,S$GLB,, | Performed by: SURGERY

## 2022-03-29 PROCEDURE — 3074F SYST BP LT 130 MM HG: CPT | Mod: CPTII,S$GLB,, | Performed by: SURGERY

## 2022-03-29 PROCEDURE — 1126F PR PAIN SEVERITY QUANTIFIED, NO PAIN PRESENT: ICD-10-PCS | Mod: CPTII,S$GLB,, | Performed by: SURGERY

## 2022-03-29 PROCEDURE — 99999 PR PBB SHADOW E&M-EST. PATIENT-LVL IV: ICD-10-PCS | Mod: PBBFAC,,, | Performed by: SURGERY

## 2022-03-29 PROCEDURE — 3074F PR MOST RECENT SYSTOLIC BLOOD PRESSURE < 130 MM HG: ICD-10-PCS | Mod: CPTII,S$GLB,, | Performed by: SURGERY

## 2022-03-29 PROCEDURE — 1101F PT FALLS ASSESS-DOCD LE1/YR: CPT | Mod: CPTII,S$GLB,, | Performed by: SURGERY

## 2022-03-29 PROCEDURE — 3078F DIAST BP <80 MM HG: CPT | Mod: CPTII,S$GLB,, | Performed by: SURGERY

## 2022-03-29 PROCEDURE — 3288F FALL RISK ASSESSMENT DOCD: CPT | Mod: CPTII,S$GLB,, | Performed by: SURGERY

## 2022-03-29 PROCEDURE — 3288F PR FALLS RISK ASSESSMENT DOCUMENTED: ICD-10-PCS | Mod: CPTII,S$GLB,, | Performed by: SURGERY

## 2022-03-29 PROCEDURE — 3078F PR MOST RECENT DIASTOLIC BLOOD PRESSURE < 80 MM HG: ICD-10-PCS | Mod: CPTII,S$GLB,, | Performed by: SURGERY

## 2022-03-29 PROCEDURE — 46600 PR DIAG2STIC A2SCOPY: ICD-10-PCS | Mod: S$GLB,,, | Performed by: SURGERY

## 2022-03-29 PROCEDURE — 99214 OFFICE O/P EST MOD 30 MIN: CPT | Mod: 25,S$GLB,, | Performed by: SURGERY

## 2022-03-29 PROCEDURE — 99999 PR PBB SHADOW E&M-EST. PATIENT-LVL IV: CPT | Mod: PBBFAC,,, | Performed by: SURGERY

## 2022-03-29 RX ORDER — METHOCARBAMOL 500 MG/1
500 TABLET, FILM COATED ORAL 4 TIMES DAILY
COMMUNITY
End: 2022-07-25

## 2022-03-29 RX ORDER — ZOSTER VACCINE RECOMBINANT, ADJUVANTED 50 MCG/0.5
KIT INTRAMUSCULAR
COMMUNITY
Start: 2022-02-15 | End: 2022-08-03 | Stop reason: ALTCHOICE

## 2022-03-29 NOTE — PROGRESS NOTES
Subjective:       Patient ID: Erica Masterson is a 76 y.o. female.    Chief Complaint: Consult (Rectal prolapse)    HPI  pleasant 76-year-old female who was referred to me for evaluation of possible rectal prolapse.  Patient notes she presented to the ER tissue protruding from her anus.  She was told she had rectal prolapse.  She notes that the tissue has been persistent.  Denies any pain or discomfort.  States it has not gotten any worse the last several weeks.  Notes occasional blood per rectum.  No pain fevers chills or changes in bowel habits.  Patient had a colonoscopy at an outside facility last year with no significant findings.  Review of Systems   Constitutional: Negative for activity change and appetite change.   Gastrointestinal: Positive for anal bleeding. Negative for abdominal distention, abdominal pain, change in bowel habit, constipation and change in bowel habit.   Hematological: Negative for adenopathy.         Objective:      Physical Exam  Vitals reviewed.   Cardiovascular:      Rate and Rhythm: Normal rate.      Pulses: Normal pulses.   Pulmonary:      Effort: Pulmonary effort is normal.   Abdominal:      General: Abdomen is flat. Bowel sounds are normal. There is no distension.   Genitourinary:     Comments: External exam does demonstrate prolapsed internal hemorrhoids in the right anterior right posterior and left lateral position.  Slight external hemorrhoidal tags in all 3 positions.  Digital rectal exam with normal sphincter tone.  Anoscopy is performed demonstrating large internal hemorrhoids in all 3 columns.  No active bleeding noted.  Patient is unable to produce any sort of rectal prolapse and findings were consistent with prolapsed hemorrhoids.  Neurological:      Mental Status: She is alert.         Assessment:     hemorrhoid prolapse  Problem List Items Addressed This Visit    None         Plan:       Lengthy discussion with the patient.  Informed her that she likely has hemorrhoid  prolapse opposed to true rectal prolapse.  She notes she is completely asymptomatic and notes the bleeding occurs at most once a month.  Have encouraged daily fiber use.  Did discuss with her about the possibility of banding.  She notes that she has no complaints of mucus leakage or irritation.  States the bleeding is minimal and only occurs once per month.  As such she would prefer to avoid banding at the present time.  She will follow-up with me on a p.r.n. basis

## 2022-04-01 ENCOUNTER — HOSPITAL ENCOUNTER (OUTPATIENT)
Facility: HOSPITAL | Age: 77
Discharge: HOME | End: 2022-04-01
Attending: INTERNAL MEDICINE | Admitting: INTERNAL MEDICINE
Payer: MEDICARE

## 2022-04-01 ENCOUNTER — ANESTHESIA EVENT (OUTPATIENT)
Dept: ENDOSCOPY | Facility: HOSPITAL | Age: 77
End: 2022-04-01
Payer: MEDICARE

## 2022-04-01 ENCOUNTER — ANESTHESIA (OUTPATIENT)
Dept: ENDOSCOPY | Facility: HOSPITAL | Age: 77
End: 2022-04-01
Payer: MEDICARE

## 2022-04-01 VITALS
OXYGEN SATURATION: 100 % | TEMPERATURE: 98 F | DIASTOLIC BLOOD PRESSURE: 68 MMHG | HEART RATE: 70 BPM | SYSTOLIC BLOOD PRESSURE: 137 MMHG | RESPIRATION RATE: 16 BRPM

## 2022-04-01 DIAGNOSIS — K31.7 GASTRIC POLYPS: ICD-10-CM

## 2022-04-01 DIAGNOSIS — K29.70 GASTRITIS, PRESENCE OF BLEEDING UNSPECIFIED, UNSPECIFIED CHRONICITY, UNSPECIFIED GASTRITIS TYPE: ICD-10-CM

## 2022-04-01 DIAGNOSIS — K44.9 HIATAL HERNIA: Primary | ICD-10-CM

## 2022-04-01 DIAGNOSIS — K21.9 GERD (GASTROESOPHAGEAL REFLUX DISEASE): ICD-10-CM

## 2022-04-01 PROCEDURE — 88305 TISSUE EXAM BY PATHOLOGIST: CPT | Mod: 59 | Performed by: STUDENT IN AN ORGANIZED HEALTH CARE EDUCATION/TRAINING PROGRAM

## 2022-04-01 PROCEDURE — 43251 EGD REMOVE LESION SNARE: CPT | Performed by: INTERNAL MEDICINE

## 2022-04-01 PROCEDURE — D9220A PRA ANESTHESIA: ICD-10-PCS | Mod: CRNA,,, | Performed by: NURSE ANESTHETIST, CERTIFIED REGISTERED

## 2022-04-01 PROCEDURE — 43239 EGD BIOPSY SINGLE/MULTIPLE: CPT | Mod: 59 | Performed by: INTERNAL MEDICINE

## 2022-04-01 PROCEDURE — 63600175 PHARM REV CODE 636 W HCPCS: Performed by: NURSE ANESTHETIST, CERTIFIED REGISTERED

## 2022-04-01 PROCEDURE — 37000009 HC ANESTHESIA EA ADD 15 MINS: Performed by: INTERNAL MEDICINE

## 2022-04-01 PROCEDURE — 37000008 HC ANESTHESIA 1ST 15 MINUTES: Performed by: INTERNAL MEDICINE

## 2022-04-01 PROCEDURE — 43239 EGD BIOPSY SINGLE/MULTIPLE: CPT | Mod: 59,,, | Performed by: INTERNAL MEDICINE

## 2022-04-01 PROCEDURE — D9220A PRA ANESTHESIA: ICD-10-PCS | Mod: ANES,,, | Performed by: ANESTHESIOLOGY

## 2022-04-01 PROCEDURE — 25000003 PHARM REV CODE 250: Performed by: INTERNAL MEDICINE

## 2022-04-01 PROCEDURE — 88305 TISSUE EXAM BY PATHOLOGIST: CPT | Mod: 26,,, | Performed by: STUDENT IN AN ORGANIZED HEALTH CARE EDUCATION/TRAINING PROGRAM

## 2022-04-01 PROCEDURE — D9220A PRA ANESTHESIA: Mod: CRNA,,, | Performed by: NURSE ANESTHETIST, CERTIFIED REGISTERED

## 2022-04-01 PROCEDURE — D9220A PRA ANESTHESIA: Mod: ANES,,, | Performed by: ANESTHESIOLOGY

## 2022-04-01 PROCEDURE — 88305 TISSUE EXAM BY PATHOLOGIST: ICD-10-PCS | Mod: 26,,, | Performed by: STUDENT IN AN ORGANIZED HEALTH CARE EDUCATION/TRAINING PROGRAM

## 2022-04-01 PROCEDURE — 25000003 PHARM REV CODE 250: Performed by: NURSE ANESTHETIST, CERTIFIED REGISTERED

## 2022-04-01 PROCEDURE — 27201089 HC SNARE, DISP (ANY): Performed by: INTERNAL MEDICINE

## 2022-04-01 PROCEDURE — 43239 PR EGD, FLEX, W/BIOPSY, SGL/MULTI: ICD-10-PCS | Mod: 59,,, | Performed by: INTERNAL MEDICINE

## 2022-04-01 PROCEDURE — 27201012 HC FORCEPS, HOT/COLD, DISP: Performed by: INTERNAL MEDICINE

## 2022-04-01 PROCEDURE — 43251 PR EGD, FLEX, W/REMOVAL, TUMOR/POLYP/LESION(S), SNARE: ICD-10-PCS | Mod: ,,, | Performed by: INTERNAL MEDICINE

## 2022-04-01 PROCEDURE — 43251 EGD REMOVE LESION SNARE: CPT | Mod: ,,, | Performed by: INTERNAL MEDICINE

## 2022-04-01 RX ORDER — SODIUM CHLORIDE 9 MG/ML
INJECTION, SOLUTION INTRAVENOUS CONTINUOUS
Status: DISCONTINUED | OUTPATIENT
Start: 2022-04-01 | End: 2022-04-01 | Stop reason: HOSPADM

## 2022-04-01 RX ORDER — LIDOCAINE HCL/PF 100 MG/5ML
SYRINGE (ML) INTRAVENOUS
Status: DISCONTINUED | OUTPATIENT
Start: 2022-04-01 | End: 2022-04-01

## 2022-04-01 RX ORDER — PROPOFOL 10 MG/ML
VIAL (ML) INTRAVENOUS
Status: DISCONTINUED | OUTPATIENT
Start: 2022-04-01 | End: 2022-04-01

## 2022-04-01 RX ADMIN — PROPOFOL 80 MG: 10 INJECTION, EMULSION INTRAVENOUS at 01:04

## 2022-04-01 RX ADMIN — PROPOFOL 20 MG: 10 INJECTION, EMULSION INTRAVENOUS at 01:04

## 2022-04-01 RX ADMIN — SODIUM CHLORIDE: 0.9 INJECTION, SOLUTION INTRAVENOUS at 12:04

## 2022-04-01 RX ADMIN — LIDOCAINE HYDROCHLORIDE 100 MG: 20 INJECTION INTRAVENOUS at 01:04

## 2022-04-01 NOTE — ANESTHESIA POSTPROCEDURE EVALUATION
Anesthesia Post Evaluation    Patient: Erica Masterson    Procedure(s) Performed: Procedure(s) (LRB):  EGD (ESOPHAGOGASTRODUODENOSCOPY) (N/A)    Final Anesthesia Type: general      Patient location during evaluation: PACU  Patient participation: Yes- Able to Participate  Level of consciousness: awake and alert  Post-procedure vital signs: reviewed and stable  Pain management: adequate  Airway patency: patent    PONV status at discharge: No PONV  Anesthetic complications: no      Cardiovascular status: hemodynamically stable  Respiratory status: unassisted and room air  Hydration status: euvolemic  Follow-up not needed.          Vitals Value Taken Time   /68 04/01/22 1415        Pulse 70 04/01/22 1415   Resp 16 04/01/22 1415   SpO2 100 % 04/01/22 1415         Event Time   Out of Recovery 14:32:34         Pain/Marco Antonio Score: Marco Antonio Score: 10 (4/1/2022  2:15 PM)

## 2022-04-01 NOTE — ANESTHESIA PREPROCEDURE EVALUATION
04/01/2022  Erica Masterson is a 76 y.o., female.      Pre-op Assessment    I have reviewed the Patient Summary Reports.     I have reviewed the Nursing Notes. I have reviewed the NPO Status.   I have reviewed the Medications.     Review of Systems  Anesthesia Hx:  No problems with previous Anesthesia    Social:  Non-Smoker    Hematology/Oncology:         -- Cancer in past history (thyroid CA):   Cardiovascular:   Hypertension, well controlled hyperlipidemia    Pulmonary:  Pulmonary Normal    Renal/:   Chronic Renal Disease, CRI    Hepatic/GI:   GERD    Musculoskeletal:   Arthritis     Neurological:   Neuromuscular Disease,    Endocrine:   Diabetes, well controlled Hypothyroidism    Psych:   Psychiatric History anxiety depression          Physical Exam  General: Well nourished, Cooperative, Alert and Oriented    Airway:  Mallampati: I   Mouth Opening: Normal  Neck ROM: Normal ROM        Anesthesia Plan  Type of Anesthesia, risks & benefits discussed:    Anesthesia Type: Gen ETT, Gen Supraglottic Airway, Gen Natural Airway, MAC  Intra-op Monitoring Plan: Standard ASA Monitors  Post Op Pain Control Plan: multimodal analgesia  Induction:  IV  Airway Plan: Direct, Video and Fiberoptic, Post-Induction  Informed Consent: Informed consent signed with the Patient and all parties understand the risks and agree with anesthesia plan.  All questions answered.   ASA Score: 3    Ready For Surgery From Anesthesia Perspective.     .

## 2022-04-01 NOTE — TRANSFER OF CARE
Anesthesia Transfer of Care Note    Patient: Erica Masterson    Procedure(s) Performed: Procedure(s) (LRB):  EGD (ESOPHAGOGASTRODUODENOSCOPY) (N/A)    Patient location: PACU    Anesthesia Type: general    Transport from OR: Transported from OR on 2-3 L/min O2 by NC with adequate spontaneous ventilation    Post pain: adequate analgesia    Post assessment: no apparent anesthetic complications and tolerated procedure well    Post vital signs: stable    Level of consciousness: awake, alert and oriented    Nausea/Vomiting: no nausea/vomiting    Complications: none    Transfer of care protocol was followed      Last vitals:   Visit Vitals  /63 (BP Location: Left arm, Patient Position: Lying)   Pulse 70   Temp 36.5 °C (97.7 °F) (Skin)   Resp 16   SpO2 100%   Breastfeeding No

## 2022-04-01 NOTE — PROVATION PATIENT INSTRUCTIONS
Discharge Summary/Instructions after an Endoscopic Procedure  Patient Name: Erica Masterson  Patient MRN: [id number]  Patient YOB: 1945  Friday, April 1, 2022  Jose Coughlin MD  Dear patient,  As a result of recent federal legislation (The Federal Cures Act), you may   receive lab or pathology results from your procedure in your [facility]   account before your physician is able to contact you. Your physician or   their representative will relay the results to you with their   recommendations at their soonest availability.  Thank you,  RESTRICTIONS:  During your procedure today, you received medications for sedation.  These   medications may affect your judgment, balance and coordination.  Therefore,   for 24 hours, you have the following restrictions:   - DO NOT drive a car, operate machinery, make legal/financial decisions,   sign important papers or drink alcohol.    ACTIVITY:  Today: no heavy lifting, straining or running due to procedural   sedation/anesthesia.  The following day: return to full activity including work.  DIET:  Eat and drink normally unless instructed otherwise.     TREATMENT FOR COMMON SIDE EFFECTS:  - Mild abdominal pain, nausea, belching, bloating or excessive gas:  rest,   eat lightly and use a heating pad.  - Sore Throat: treat with throat lozenges and/or gargle with warm salt   water.  - Because air was used during the procedure, expelling large amounts of air   from your rectum or belching is normal.  - If a bowel prep was taken, you may not have a bowel movement for 1-3 days.    This is normal.  SYMPTOMS TO WATCH FOR AND REPORT TO YOUR PHYSICIAN:  1. Abdominal pain or bloating, other than gas cramps.  2. Chest pain.  3. Back pain.  4. Signs of infection such as: chills or fever occurring within 24 hours   after the procedure.  5. Rectal bleeding, which would show as bright red, maroon, or black stools.   (A tablespoon of blood from the rectum is not serious, especially if    hemorrhoids are present.)  6. Vomiting.  7. Weakness or dizziness.  GO DIRECTLY TO THE NEAREST EMERGENCY ROOM IF YOU HAVE ANY OF THE FOLLOWING:      Difficulty breathing              Chills and/or fever over 101 F   Persistent vomiting and/or vomiting blood   Severe abdominal pain   Severe chest pain   Black, tarry stools   Bleeding- more than one tablespoon   Any other symptom or condition that you feel may need urgent attention  Your doctor recommends these additional instructions:  If any biopsies were taken, your doctors clinic will contact you in 1 to 2   weeks with any results.  - Patient has a contact number available for emergencies.  The signs and   symptoms of potential delayed complications were discussed with the   patient.  Return to normal activities tomorrow.  Written discharge   instructions were provided to the patient.   - Resume previous diet.   - Continue present medications.   - No aspirin, ibuprofen, naproxen, or other non-steroidal anti-inflammatory   drugs.   - Await pathology results.   - Discharge patient to home (ambulatory).   - Follow an antireflux regimen.   - Return to my office after studies are complete.  For questions, problems or results please call your physician - Jose Coughlin MD at Work:  [phone].  [facility], EMERGENCY ROOM PHONE NUMBER: [phone]  IF A COMPLICATION OR EMERGENCY SITUATION ARISES AND YOU ARE UNABLE TO REACH   YOUR PHYSICIAN - GO DIRECTLY TO THE EMERGENCY ROOM.  Jose Coughlin MD  4/1/2022 1:53:10 PM  This report has been verified and signed electronically.  Dear patient,  As a result of recent federal legislation (The Federal Cures Act), you may   receive lab or pathology results from your procedure in your [facility]   account before your physician is able to contact you. Your physician or   their representative will relay the results to you with their   recommendations at their soonest availability.  Thank you,  PROVATION

## 2022-04-04 ENCOUNTER — OFFICE VISIT (OUTPATIENT)
Dept: FAMILY MEDICINE | Facility: CLINIC | Age: 77
End: 2022-04-04
Attending: FAMILY MEDICINE
Payer: MEDICARE

## 2022-04-04 VITALS
OXYGEN SATURATION: 98 % | WEIGHT: 150.44 LBS | TEMPERATURE: 98 F | RESPIRATION RATE: 18 BRPM | SYSTOLIC BLOOD PRESSURE: 120 MMHG | HEIGHT: 66 IN | DIASTOLIC BLOOD PRESSURE: 78 MMHG | HEART RATE: 72 BPM | BODY MASS INDEX: 24.18 KG/M2

## 2022-04-04 DIAGNOSIS — E11.00 TYPE 2 DIABETES MELLITUS WITH HYPEROSMOLARITY WITHOUT COMA, WITHOUT LONG-TERM CURRENT USE OF INSULIN: Primary | Chronic | ICD-10-CM

## 2022-04-04 DIAGNOSIS — C73 METASTASIS FROM THYROID CANCER: ICD-10-CM

## 2022-04-04 DIAGNOSIS — I15.2 HYPERTENSION ASSOCIATED WITH DIABETES: Chronic | ICD-10-CM

## 2022-04-04 DIAGNOSIS — E78.5 HYPERLIPIDEMIA ASSOCIATED WITH TYPE 2 DIABETES MELLITUS: Chronic | ICD-10-CM

## 2022-04-04 DIAGNOSIS — C73 THYROID CANCER: Chronic | ICD-10-CM

## 2022-04-04 DIAGNOSIS — E11.69 HYPERLIPIDEMIA ASSOCIATED WITH TYPE 2 DIABETES MELLITUS: Chronic | ICD-10-CM

## 2022-04-04 DIAGNOSIS — C79.9 METASTASIS FROM THYROID CANCER: ICD-10-CM

## 2022-04-04 DIAGNOSIS — E89.0 POSTOPERATIVE HYPOTHYROIDISM: Chronic | ICD-10-CM

## 2022-04-04 DIAGNOSIS — E11.59 HYPERTENSION ASSOCIATED WITH DIABETES: Chronic | ICD-10-CM

## 2022-04-04 LAB — VIT B1 BLD-SCNC: 86 NMOL/L (ref 66.5–200)

## 2022-04-04 PROCEDURE — 1159F PR MEDICATION LIST DOCUMENTED IN MEDICAL RECORD: ICD-10-PCS | Mod: CPTII,S$GLB,, | Performed by: FAMILY MEDICINE

## 2022-04-04 PROCEDURE — 1126F PR PAIN SEVERITY QUANTIFIED, NO PAIN PRESENT: ICD-10-PCS | Mod: CPTII,S$GLB,, | Performed by: FAMILY MEDICINE

## 2022-04-04 PROCEDURE — 1101F PT FALLS ASSESS-DOCD LE1/YR: CPT | Mod: CPTII,S$GLB,, | Performed by: FAMILY MEDICINE

## 2022-04-04 PROCEDURE — 99213 OFFICE O/P EST LOW 20 MIN: CPT | Mod: S$GLB,,, | Performed by: FAMILY MEDICINE

## 2022-04-04 PROCEDURE — 1101F PR PT FALLS ASSESS DOC 0-1 FALLS W/OUT INJ PAST YR: ICD-10-PCS | Mod: CPTII,S$GLB,, | Performed by: FAMILY MEDICINE

## 2022-04-04 PROCEDURE — 3078F DIAST BP <80 MM HG: CPT | Mod: CPTII,S$GLB,, | Performed by: FAMILY MEDICINE

## 2022-04-04 PROCEDURE — 99499 RISK ADDL DX/OHS AUDIT: ICD-10-PCS | Mod: S$GLB,,, | Performed by: FAMILY MEDICINE

## 2022-04-04 PROCEDURE — 99999 PR PBB SHADOW E&M-EST. PATIENT-LVL V: CPT | Mod: PBBFAC,,, | Performed by: FAMILY MEDICINE

## 2022-04-04 PROCEDURE — 1160F RVW MEDS BY RX/DR IN RCRD: CPT | Mod: CPTII,S$GLB,, | Performed by: FAMILY MEDICINE

## 2022-04-04 PROCEDURE — 3078F PR MOST RECENT DIASTOLIC BLOOD PRESSURE < 80 MM HG: ICD-10-PCS | Mod: CPTII,S$GLB,, | Performed by: FAMILY MEDICINE

## 2022-04-04 PROCEDURE — 3074F SYST BP LT 130 MM HG: CPT | Mod: CPTII,S$GLB,, | Performed by: FAMILY MEDICINE

## 2022-04-04 PROCEDURE — 99999 PR PBB SHADOW E&M-EST. PATIENT-LVL V: ICD-10-PCS | Mod: PBBFAC,,, | Performed by: FAMILY MEDICINE

## 2022-04-04 PROCEDURE — 3074F PR MOST RECENT SYSTOLIC BLOOD PRESSURE < 130 MM HG: ICD-10-PCS | Mod: CPTII,S$GLB,, | Performed by: FAMILY MEDICINE

## 2022-04-04 PROCEDURE — 3288F PR FALLS RISK ASSESSMENT DOCUMENTED: ICD-10-PCS | Mod: CPTII,S$GLB,, | Performed by: FAMILY MEDICINE

## 2022-04-04 PROCEDURE — 99499 UNLISTED E&M SERVICE: CPT | Mod: S$GLB,,, | Performed by: FAMILY MEDICINE

## 2022-04-04 PROCEDURE — 1160F PR REVIEW ALL MEDS BY PRESCRIBER/CLIN PHARMACIST DOCUMENTED: ICD-10-PCS | Mod: CPTII,S$GLB,, | Performed by: FAMILY MEDICINE

## 2022-04-04 PROCEDURE — 3288F FALL RISK ASSESSMENT DOCD: CPT | Mod: CPTII,S$GLB,, | Performed by: FAMILY MEDICINE

## 2022-04-04 PROCEDURE — 99213 PR OFFICE/OUTPT VISIT, EST, LEVL III, 20-29 MIN: ICD-10-PCS | Mod: S$GLB,,, | Performed by: FAMILY MEDICINE

## 2022-04-04 PROCEDURE — 1126F AMNT PAIN NOTED NONE PRSNT: CPT | Mod: CPTII,S$GLB,, | Performed by: FAMILY MEDICINE

## 2022-04-04 PROCEDURE — 1159F MED LIST DOCD IN RCRD: CPT | Mod: CPTII,S$GLB,, | Performed by: FAMILY MEDICINE

## 2022-04-04 RX ORDER — LANSOPRAZOLE 30 MG/1
30 CAPSULE, DELAYED RELEASE ORAL DAILY
COMMUNITY
End: 2022-04-25 | Stop reason: ALTCHOICE

## 2022-04-04 NOTE — PROGRESS NOTES
Subjective:       Patient ID: Erica Masterson is a 76 y.o. female.    Chief Complaint: Diabetes (Pt states that she is here for her follow up ), Neck Pain (Pt states that she has a bit of neck tightness ), and Leg Pain (Pt states that she had some leg pain and went to the emergency room  they did a ultrasound on her extremities for blood flow, she hasn't gotten any results would like to speak about it )    76-year-old female coming in follow-up on multiple medical problems.  She is status post thyroidectomy for thyroid cancer with multiple lymph nodes positive.  She is scheduled Wednesday for a recheck thyroid level as well as an A1c, lipid panel, CMP, and microalbumin/creatinine ratio.  She has been having some epigastric discomfort and was found to have a hiatal hernia and some chronic gastritis with multiple polyps removed from the stomach.  Pathology report is still pending.  On a previous EGD three polyps were removed in their were all benign when no sign of Helicobacter pylori present.  The patient's blood sugar has been ranging in the 108 to 118 range recently and she has had no episodes of hypoglycemia.  She actually feels quite well at this time.  She has some questions regarding the TSH and T4 levels and I discussed them explaining to her that we want to TSH slightly suppressed to avoid stimulating any residual thyroid cancer cells.  Her GI symptoms have now resolved.    Past Medical History:  No date: Allergy  No date: Anxiety  No date: Cancer  No date: Cataract  No date: Colon polyp  04/03/2012: Degenerative arthritis of knee  No date: Diverticulosis  No date: Encounter for blood transfusion  No date: GERD (gastroesophageal reflux disease)  2021: History of thyroid cancer  No date: Hyperlipidemia  No date: Hypertension  03/26/2012: Multinodular goiter  01/18/2010: Myopathy, unspecified  No date: Tuberculosis    Past Surgical History:  2015: BREAST BIOPSY; Left      Comment:  benign  No date:  CHOLECYSTECTOMY  12/2/15: COLONOSCOPY      Comment:  Dr. Britton, multiple polyps, recheck five years-three                years if more than 2 polyps are adenomatous  12/2/2015: COLONOSCOPY; N/A  3/20/2019: COLONOSCOPY; N/A      Comment:  Procedure: COLONOSCOPY;  Surgeon: Jose Britton MD;                Location: [facility];  Service: Endoscopy;  Laterality:                N/A;  5/20/2020: COLONOSCOPY; N/A      Comment:  Dr. Britton; internal hemorrhoids; diverticulosis;                polyps removed; repeat in 3 years  8/26/2020: CYSTOSCOPY; N/A      Comment:  Procedure: CYSTOSCOPY;  Surgeon: Charlotte Walters Jr., MD;               Location: [facility] OR;  Service: Urology;  Laterality: N/A;  9/13/2021: DISSECTION OF NECK; Left      Comment:  Procedure: DISSECTION, NECK;  Surgeon: Ryan Torres MD;  Location: [facility] OR [address] [place] [address];  Service: ENT;                 Laterality: Left;  5/20/2020: ESOPHAGOGASTRODUODENOSCOPY; N/A      Comment:  Dr. Britton; small hiatal hernia; gastritis; 8 gastric                polyps removed  4/1/2022: ESOPHAGOGASTRODUODENOSCOPY; N/A      Comment:  Procedure: EGD (ESOPHAGOGASTRODUODENOSCOPY);  Surgeon:                Jose Britton MD;  Location: [facility];  Service:                Endoscopy;  Laterality: N/A;  No date: FOOT SURGERY  No date: HYSTERECTOMY      Comment:  TVH/BSO > 20 years  6/4/2020: INTRALUMINAL GASTROINTESTINAL TRACT IMAGING VIA CAPSULE; N/A      Comment:  Procedure: IMAGING PROCEDURE, GI TRACT, INTRALUMINAL,                VIA CAPSULE;  Surgeon: Jose Britton MD;  Location:                [facility];  Service: Endoscopy;  Laterality: N/A;  06/06/2013: KNEE SURGERY      Comment:  right knee tear Dr Iverson   9/17/2020: LAPAROSCOPIC CHOLECYSTECTOMY; N/A      Comment:  Procedure: CHOLECYSTECTOMY, LAPAROSCOPIC;  Surgeon:                Forrest Veronica MD;  Location: [facility];  Service:                General;  Laterality: N/A;  1966: LUNG LOBECTOMY       Comment:  right middle lobectomy, due to Tb  No date: OOPHORECTOMY  No date: SHOULDER SURGERY      Comment:  francis   5/19/2021: THYROIDECTOMY; Bilateral      Comment:  Procedure: THYROIDECTOMY;  Surgeon: Jamia Amezquita MD;  Location: [address];  Service: General;                 Laterality: Bilateral;  9/13/2021: THYROIDECTOMY; Bilateral      Comment:  Procedure: THYROIDECTOMY WITH INTRA-OP PTH;  Surgeon:                Ryan Torres MD;  Location: [address];  Service:               ENT;  Laterality: Bilateral;  NIM tube  Intraop PTH    Current Outpatient Medications on File Prior to Visit:  amLODIPine (NORVASC) 2.5 MG tablet, Take 1 tablet by mouth once daily, Disp: 90 tablet, Rfl: 0  blood sugar diagnostic (BLOOD GLUCOSE TEST) Strp, True Metrix glucose strips check once daily, Disp: 100 each, Rfl: 3  carboxymethylcellulose sodium (REFRESH OPHT), Apply 1 drop to eye daily as needed., Disp: , Rfl:   conjugated estrogens (PREMARIN) vaginal cream, Place 0.5 g vaginally once daily AND 0.5 g twice a week. (Patient taking differently: Place 0.5 g vaginally once daily AND 0.5 g twice a week. Saturdays and tuesdays), Disp: 30 g, Rfl: 7  doxepin (SINEQUAN) 10 MG capsule, Take 10 mg by mouth every evening., Disp: , Rfl:   ergocalciferol (ERGOCALCIFEROL) 50,000 unit Cap, Take 1 capsule (50,000 Units total) by mouth every 30 days., Disp: 3 capsule, Rfl: 3  gabapentin (NEURONTIN) 300 MG capsule, Take 1 capsule (300 mg total) by mouth 3 (three) times daily., Disp: 90 capsule, Rfl: 0  lansoprazole (PREVACID) 30 MG capsule, Take 30 mg by mouth once daily., Disp: , Rfl:   levothyroxine (SYNTHROID) 100 MCG tablet, Take 1 tablet (100 mcg total) by mouth before breakfast., Disp: 30 tablet, Rfl: 11  LINZESS 290 mcg Cap capsule, Take 290 mcg by mouth once daily. prn, Disp: , Rfl:   losartan (COZAAR) 100 MG tablet, Take 1 tablet (100 mg total) by mouth once daily., Disp: 90 tablet, Rfl: 3  methocarbamoL  (ROBAXIN) 500 MG Tab, Take 500 mg by mouth 4 (four) times daily., Disp: , Rfl:   polyethylene glycol (GLYCOLAX) 17 gram PwPk, Take 17 g by mouth once daily., Disp: 30 each, Rfl: 0  potassium chloride (KLOR-CON) 10 MEQ TbSR, Take 10 mEq by mouth every other day., Disp: , Rfl:   rosuvastatin (CRESTOR) 20 MG tablet, TAKE 1 TABLET BY MOUTH ONCE DAILY IN THE EVENING, Disp: 90 tablet, Rfl: 0  simethicone (MYLICON) 125 MG chewable tablet, Take 125 mg by mouth every 6 (six) hours as needed for Flatulence., Disp: , Rfl:   tiZANidine (ZANAFLEX) 4 MG tablet, Take 4 mg by mouth every 6 (six) hours as needed., Disp: , Rfl:   blood-glucose meter kit, True Metrix glucometer check glucose once daily, Disp: 1 each, Rfl: 0  calcium carbonate (TUMS) 200 mg calcium (500 mg) chewable tablet, Chew and swallow 2 tablets (1,000 mg total) by mouth 3 (three) times daily. for 14 days (Patient not taking: No sig reported), Disp: 100 tablet, Rfl: 0  metFORMIN (GLUCOPHAGE-XR) 500 MG ER 24hr tablet, , Disp: , Rfl:   SHINGRIX, PF, 50 mcg/0.5 mL injection, , Disp: , Rfl:     Current Facility-Administered Medications on File Prior to Visit:  electrolyte-S (ISOLYTE), , Intravenous, Continuous, Nilay Jeffries MD, Last Rate: 10 mL/hr at 09/17/20 0950, New Bag at 09/17/20 1155          Review of Systems   Constitutional: Negative for activity change, appetite change, chills, fever and unexpected weight change.   Gastrointestinal: Positive for rectal pain (She had some prolapsed hemorrhoids and was treated by Dr. Patel). Negative for abdominal pain (Now resolved).       Objective:      Physical Exam  Vitals and nursing note reviewed.   Constitutional:       General: She is not in acute distress.     Appearance: Normal appearance. She is normal weight. She is not ill-appearing, toxic-appearing or diaphoretic.      Comments: Good blood pressure control  Normal weight with a BMI of 24.3 she is up 2.6 lb from her January 24, 2022 visit   Cardiovascular:       Rate and Rhythm: Normal rate and regular rhythm.      Pulses:           Dorsalis pedis pulses are 2+ on the right side and 2+ on the left side.        Posterior tibial pulses are 2+ on the right side and 2+ on the left side.      Heart sounds: Normal heart sounds.   Pulmonary:      Effort: Pulmonary effort is normal.      Breath sounds: Normal breath sounds.   Musculoskeletal:      Right lower leg: No edema.      Left lower leg: No edema.      Right foot: Normal range of motion. Deformity (Overriding 2nd toe, appears congenital/hereditary) present. No bunion, Charcot foot, foot drop or prominent metatarsal heads.      Left foot: Normal range of motion. Deformity (Overriding 2nd toe, appears congenital/hereditary) present. No bunion, Charcot foot, foot drop or prominent metatarsal heads.   Feet:      Right foot:      Protective Sensation: 9 sites tested. 9 sites sensed.      Skin integrity: Skin integrity normal. No ulcer, blister, skin breakdown, erythema, warmth, callus, dry skin or fissure.      Toenail Condition: Right toenails are normal.      Left foot:      Protective Sensation: 9 sites tested. 9 sites sensed.      Skin integrity: Skin integrity normal. No ulcer, blister, skin breakdown, erythema, warmth, callus, dry skin or fissure.      Toenail Condition: Left toenails are normal.   Neurological:      General: No focal deficit present.      Mental Status: She is alert and oriented to person, place, and time. Mental status is at baseline.   Psychiatric:         Mood and Affect: Mood normal.         Behavior: Behavior normal.         Thought Content: Thought content normal.         Judgment: Judgment normal.         Assessment:       1. Type 2 diabetes mellitus with hyperosmolarity without coma, without long-term current use of insulin    2. Thyroid cancer    3. Postoperative hypothyroidism    4. Hypertension associated with diabetes    5. Hyperlipidemia associated with type 2 diabetes mellitus, baseline LDL  196    6. Metastasis from thyroid cancer        Plan:       1. Type 2 diabetes mellitus with hyperosmolarity without coma, without long-term current use of insulin  A1c, lipid panel, CMP, and microalbumin/creatinine ratio scheduled April 6, 2022 along with thyroid labs from Dr. Eubanks    2. Thyroid cancer  Followed by endocrinology    3. Postoperative hypothyroidism  Followed by endocrinology, TSH should be slightly suppressed    4. Hypertension associated with diabetes  Good control no changes needed    5. Hyperlipidemia associated with type 2 diabetes mellitus, baseline   Await lipid panel previously ordered    6. Metastasis from thyroid cancer  Status post radioactive iodine treatment from nuclear Medicine

## 2022-04-06 ENCOUNTER — HOSPITAL ENCOUNTER (OUTPATIENT)
Dept: RADIOLOGY | Facility: HOSPITAL | Age: 77
Discharge: HOME | End: 2022-04-06
Attending: INTERNAL MEDICINE
Payer: MEDICARE

## 2022-04-06 DIAGNOSIS — C73 MALIGNANT NEOPLASM OF THYROID GLAND: ICD-10-CM

## 2022-04-06 PROCEDURE — 76536 US EXAM OF HEAD AND NECK: CPT | Mod: TC

## 2022-04-06 PROCEDURE — 76536 US SOFT TISSUE HEAD NECK THYROID: ICD-10-PCS | Mod: 26,,, | Performed by: RADIOLOGY

## 2022-04-06 PROCEDURE — 76536 US EXAM OF HEAD AND NECK: CPT | Mod: 26,,, | Performed by: RADIOLOGY

## 2022-04-11 ENCOUNTER — CLINICAL SUPPORT (OUTPATIENT)
Dept: REHABILITATION | Facility: HOSPITAL | Age: 77
End: 2022-04-11
Attending: ORTHOPAEDIC SURGERY
Payer: MEDICARE

## 2022-04-11 DIAGNOSIS — R29.898 DECREASED STRENGTH OF UPPER EXTREMITY: ICD-10-CM

## 2022-04-11 DIAGNOSIS — M25.512 LEFT SHOULDER PAIN, UNSPECIFIED CHRONICITY: ICD-10-CM

## 2022-04-11 DIAGNOSIS — M53.82 DECREASED RANGE OF MOTION OF INTERVERTEBRAL DISCS OF CERVICAL SPINE: Primary | ICD-10-CM

## 2022-04-11 DIAGNOSIS — G52.8 SPINAL ACCESSORY NERVE DISORDER: ICD-10-CM

## 2022-04-11 PROCEDURE — 97110 THERAPEUTIC EXERCISES: CPT | Mod: KX,PN

## 2022-04-11 NOTE — PROGRESS NOTES
[facility]  Outpatient Physical Therapy Daily Treatment Note/Re-Assessment       Name: Erica Masterson  Clinic Number: [id number]  Visit Date: 4/11/2022    Therapy Diagnosis:   Encounter Diagnoses   Name Primary?    Spinal accessory nerve disorder     Left shoulder pain, unspecified chronicity     Decreased range of motion of intervertebral discs of cervical spine Yes    Decreased strength of upper extremity        Physician: Adriel Iverson,*   Physician Orders: PT Eval and Treat   Medical Diagnosis from Referral: M25.512 (ICD-10-CM) - Left shoulder pain, unspecified chronicity  Evaluation Date: 1/24/2022  Authorization Period Expiration: 2/27/2022  Plan of Care Expiration: 4/25/2022  Progress Note Due: 2/24/2022  Visit # / Visits authorized: 13 / 17 (13 / 16)  FOTO: 0/3     Precautions: Standard, Diabetes, cancer and thyroidectomy      Time In: 1135  Time Out: 1215  Total Appointment Time (timed & untimed codes): 40 minutes      Subjective     Pt reports: her main complaint remains Left anterior shoulder/pec and upper trap stiffness. She does report improved pain as well.     Response to previous treatment: Positive    Functional change: None stated      Pain: 3-4 /10  Location: left neck  and shoulder      Objective     Objective measures:     Posture: Depressed and downwardly rotated scapula on Left    Active Range of Motion:   Shoulder Right Left   Flexion 170 170   ER at 0 70 70   Functional IR  T12 T12     Strength:  Shoulder Right Left   Flexion 5/5 5/5   Abduction 5/5 4+/5   ER 5/5 5/5   IR 5/5 5/5   Serratus Anterior 4-/5 4-/5   Middle Trap 3-/5 3-/5   Low Trap 3-/5 3-/5         Erica received therapeutic exercises to develop strength, endurance, ROM, flexibility, posture and core stabilization for 40 minutes including:    Seated thoracic extension over towel roll x20 with 3s hold   Prone T isometric 3x6 with 3-5s hold Left upper extremity  only  Prone Y isometric 3x6 with 3-5s hold Left upper extremity only   Touchdowns level II 2x10 with 3s hold     Not today:   UBE 2 min FWD and BWD each way   Supine pect stretch over half bolster x 3 min   Standing shrug 3lb DB 2 x 10   Seated no money ER YTB 20x   Rocket tape with 3 I strips applied for scapular retraction/posture, upper trap facilitation, levator inhibition   Standing shoulder ER and flexion YTB 2 x 10   Standing shoulder extension RTB 2 x 10     Erica participated in neuromuscular re-education activities to improve: Coordination, Kinesthetic, Sense, Proprioception and Posture for 0 minutes. The following activities were included:    Standing serratus punch YTB 2 x 10  SL open book each way 10x    Supine chin retraction into towel 15x  Supine chin retraction with tuck and lift 15x  Seated chin retraction 15x     NP:  Prone row 3lb DB 2 x 10   Prone Y 2 x 10   Attempted prone T and then SL horizontal ABD, significant winging so performed in gravity eliminated position in standing against wall  2 x 15 reps - added to HEP  Standing lift off for lower trap activation 2 x 15 reps -  Added to HEP      Patient Education and HEP     She was compliant with home exercise program.    Education provided:   - HEP     Written Home Exercises Provided: Patient instructed to cont prior HEP.  Exercises were reviewed and Erica was able to demonstrate them prior to the end of the session.  Erica demonstrated good  understanding of the education provided.     See EMR under Patient Instructions for exercises provided prior visit.    Re-Assessment     Erica has been seen for a total of 14 visits since 1/24/2022 for her Left neck and shoulder pain following a thyroidectomy performed in September of 2021. Since the procedure she had significant weakness of her trapezius musculature secondary to an injury to her spinal accessor nerve. I am familiar with this patient from previous therapy in 2021 following the same procedure  and she demonstrates significant improvements in Left shoulder strength and mobility. She returned to her MD for follow up on 03/28 and he recommended continued physical therapy. Today she presents with subjective complaints of tightness in her Left upper trap/pec and demonstrartes weakness of her Left trapezius musculature and serratus anterior as well as impaired posture. Due to neural involvement her prognosis is lengthened. She will continue to benefit from skilled outpatient PT to address the above deficits and maximize her function.     Erica Is progressing well towards her goals.   Pt prognosis is Good.     Pt's spiritual, cultural and educational needs considered and pt agreeable to plan of care and goals.    Anticipated barriers to physical therapy: history of Thyroid surgery    Goals:    STG  Weeks/Visits Date Established  Goal Status    1. Pt will increase cervical extension AROM to >/= 45 deg to improve her ability to look up  5 weeks/ 10 visits  1/24/2022    MET 2/25/2022   2. Pt will increase cervical AROM rotation by 10 deg to improve ability to look over shoulder while driving  5 weeks/ 10 visits  1/24/2022    Partially MET  4/11/2022      3.Pt will report improvement in quality of sleep with decreased reports of night pain since IE to improve quality of life 5 weeks/ 10 visits  1/24/2022    MET ( per pt she is able to get longer stretches of sleep than prior to IE) 2/25/2022   4. FOTO goal 5 weeks/ 10 visits  1/24/2022    MET 2/25/2022   5.Pt will demonstrate and verbalize compliance and independence with HEP to improve therapy outcomes  5 weeks/ 10 visits  1/24/2022    In progress  4/11/2022      6. Pt will report </= 5/10 current pain and </= 7/10 pain for at worst pain over the past week to improve activity tolerance  5 weeks/ 10 visits  1/24/2022    MET 2/25/2022      LTG Weeks/Visits Date Established  Goal Status    1.Pt will increase LUE strength to >/= 4/5 to improve ability to lift objects   10 weeks/ 16 visits  1/24/2022    MET 2/25/2022   2. Pt will report decreased tightness of neck in the morning to improve functional mobility  10 weeks/ 16 visits  1/24/2022       In progress  4/11/2022      3.FOTO goal 10 weeks/ 16 visits  1/24/2022    In progress  4/11/2022      4. Pt will demonstrate improved seated posture to improve  10 weeks/ 16 visits  1/24/2022     In progress  4/11/2022     5.Pt will report </= 2/10 current pain and </= 4/10 pain for at worst pain over the past week to improve activity tolerance  10 weeks/ 16 visits  1/24/2022 In progress  4/11/2022       Plan     Reasons for Recertification of Therapy:   subjective complaints of tightness in her Left upper trap/pec and demonstrartes weakness of her Left trapezius musculature and serratus anterior as well as impaired posture.    Updated Certification Period: 4/11/2022 to 06/11/2022  Recommended Treatment Plan: 1 times per week for 6 weeks: Neuromuscular Re-ed, Patient Education, Therapeutic Activities and Therapeutic Exercise  Other Recommendations:     Noah Sanchez, KIARA  4/11/2022      I CERTIFY THE NEED FOR THESE SERVICES FURNISHED UNDER THIS PLAN OF TREATMENT AND WHILE UNDER MY CARE    Physician's comments:        Physician's Signature: ___________________________________________________        Noah Sanchez PT

## 2022-04-12 LAB
FINAL PATHOLOGIC DIAGNOSIS: NORMAL
GROSS: NORMAL
Lab: NORMAL

## 2022-04-12 NOTE — PLAN OF CARE
[facility]  Outpatient Physical Therapy Daily Treatment Note/Re-Assessment       Name: Erica Masterson  Clinic Number: [id number]  Visit Date: 4/11/2022    Therapy Diagnosis:   Encounter Diagnoses   Name Primary?    Spinal accessory nerve disorder     Left shoulder pain, unspecified chronicity     Decreased range of motion of intervertebral discs of cervical spine Yes    Decreased strength of upper extremity        Physician: Adriel Iverson,*   Physician Orders: PT Eval and Treat   Medical Diagnosis from Referral: M25.512 (ICD-10-CM) - Left shoulder pain, unspecified chronicity  Evaluation Date: 1/24/2022  Authorization Period Expiration: 2/27/2022  Plan of Care Expiration: 4/25/2022  Progress Note Due: 2/24/2022  Visit # / Visits authorized: 13 / 17 (13 / 16)  FOTO: 0/3     Precautions: Standard, Diabetes, cancer and thyroidectomy      Time In: 1135  Time Out: 1215  Total Appointment Time (timed & untimed codes): 40 minutes      Subjective     Pt reports: her main complaint remains Left anterior shoulder/pec and upper trap stiffness. She does report improved pain as well.     Response to previous treatment: Positive    Functional change: None stated      Pain: 3-4 /10  Location: left neck  and shoulder      Objective     Objective measures:     Posture: Depressed and downwardly rotated scapula on Left    Active Range of Motion:   Shoulder Right Left   Flexion 170 170   ER at 0 70 70   Functional IR  T12 T12     Strength:  Shoulder Right Left   Flexion 5/5 5/5   Abduction 5/5 4+/5   ER 5/5 5/5   IR 5/5 5/5   Serratus Anterior 4-/5 4-/5   Middle Trap 3-/5 3-/5   Low Trap 3-/5 3-/5         Erica received therapeutic exercises to develop strength, endurance, ROM, flexibility, posture and core stabilization for 40 minutes including:    Seated thoracic extension over towel roll x20 with 3s hold   Prone T isometric 3x6 with 3-5s hold Left upper extremity  only  Prone Y isometric 3x6 with 3-5s hold Left upper extremity only   Touchdowns level II 2x10 with 3s hold     Not today:   UBE 2 min FWD and BWD each way   Supine pect stretch over half bolster x 3 min   Standing shrug 3lb DB 2 x 10   Seated no money ER YTB 20x   Rocket tape with 3 I strips applied for scapular retraction/posture, upper trap facilitation, levator inhibition   Standing shoulder ER and flexion YTB 2 x 10   Standing shoulder extension RTB 2 x 10     Erica participated in neuromuscular re-education activities to improve: Coordination, Kinesthetic, Sense, Proprioception and Posture for 0 minutes. The following activities were included:    Standing serratus punch YTB 2 x 10  SL open book each way 10x    Supine chin retraction into towel 15x  Supine chin retraction with tuck and lift 15x  Seated chin retraction 15x     NP:  Prone row 3lb DB 2 x 10   Prone Y 2 x 10   Attempted prone T and then SL horizontal ABD, significant winging so performed in gravity eliminated position in standing against wall  2 x 15 reps - added to HEP  Standing lift off for lower trap activation 2 x 15 reps -  Added to HEP      Patient Education and HEP     She was compliant with home exercise program.    Education provided:   - HEP     Written Home Exercises Provided: Patient instructed to cont prior HEP.  Exercises were reviewed and Erica was able to demonstrate them prior to the end of the session.  Erica demonstrated good  understanding of the education provided.     See EMR under Patient Instructions for exercises provided prior visit.    Re-Assessment     Erica has been seen for a total of 14 visits since 1/24/2022 for her Left neck and shoulder pain following a thyroidectomy performed in September of 2021. Since the procedure she had significant weakness of her trapezius musculature secondary to an injury to her spinal accessor nerve. I am familiar with this patient from previous therapy in 2021 following the same procedure  and she demonstrates significant improvements in Left shoulder strength and mobility. She returned to her MD for follow up on 03/28 and he recommended continued physical therapy. Today she presents with subjective complaints of tightness in her Left upper trap/pec and demonstrartes weakness of her Left trapezius musculature and serratus anterior as well as impaired posture. Due to neural involvement her prognosis is lengthened. She will continue to benefit from skilled outpatient PT to address the above deficits and maximize her function.     Erica Is progressing well towards her goals.   Pt prognosis is Good.     Pt's spiritual, cultural and educational needs considered and pt agreeable to plan of care and goals.    Anticipated barriers to physical therapy: history of Thyroid surgery    Goals:    STG  Weeks/Visits Date Established  Goal Status    1. Pt will increase cervical extension AROM to >/= 45 deg to improve her ability to look up  5 weeks/ 10 visits  1/24/2022    MET 2/25/2022   2. Pt will increase cervical AROM rotation by 10 deg to improve ability to look over shoulder while driving  5 weeks/ 10 visits  1/24/2022    Partially MET  4/11/2022      3.Pt will report improvement in quality of sleep with decreased reports of night pain since IE to improve quality of life 5 weeks/ 10 visits  1/24/2022    MET ( per pt she is able to get longer stretches of sleep than prior to IE) 2/25/2022   4. FOTO goal 5 weeks/ 10 visits  1/24/2022    MET 2/25/2022   5.Pt will demonstrate and verbalize compliance and independence with HEP to improve therapy outcomes  5 weeks/ 10 visits  1/24/2022    In progress  4/11/2022      6. Pt will report </= 5/10 current pain and </= 7/10 pain for at worst pain over the past week to improve activity tolerance  5 weeks/ 10 visits  1/24/2022    MET 2/25/2022      LTG Weeks/Visits Date Established  Goal Status    1.Pt will increase LUE strength to >/= 4/5 to improve ability to lift objects   10 weeks/ 16 visits  1/24/2022    MET 2/25/2022   2. Pt will report decreased tightness of neck in the morning to improve functional mobility  10 weeks/ 16 visits  1/24/2022       In progress  4/11/2022      3.FOTO goal 10 weeks/ 16 visits  1/24/2022    In progress  4/11/2022      4. Pt will demonstrate improved seated posture to improve  10 weeks/ 16 visits  1/24/2022     In progress  4/11/2022     5.Pt will report </= 2/10 current pain and </= 4/10 pain for at worst pain over the past week to improve activity tolerance  10 weeks/ 16 visits  1/24/2022 In progress  4/11/2022       Plan     Reasons for Recertification of Therapy:   subjective complaints of tightness in her Left upper trap/pec and demonstrartes weakness of her Left trapezius musculature and serratus anterior as well as impaired posture.    Updated Certification Period: 4/11/2022 to 06/11/2022  Recommended Treatment Plan: 1 times per week for 6 weeks: Neuromuscular Re-ed, Patient Education, Therapeutic Activities and Therapeutic Exercise  Other Recommendations:     Noah Sanchez, KIARA  4/11/2022      I CERTIFY THE NEED FOR THESE SERVICES FURNISHED UNDER THIS PLAN OF TREATMENT AND WHILE UNDER MY CARE    Physician's comments:        Physician's Signature: ___________________________________________________        Noah Sanchez PT

## 2022-04-13 ENCOUNTER — TELEPHONE (OUTPATIENT)
Dept: GASTROENTEROLOGY | Facility: CLINIC | Age: 77
End: 2022-04-13
Payer: MEDICARE

## 2022-04-13 ENCOUNTER — TELEPHONE (OUTPATIENT)
Dept: ENDOCRINOLOGY | Facility: CLINIC | Age: 77
End: 2022-04-13
Payer: MEDICARE

## 2022-04-13 NOTE — TELEPHONE ENCOUNTER
----- Message from Rafael Fletcher sent at 4/13/2022  2:42 PM CDT -----  Contact: pt at [phone]  Type:  Test Results    Who Called:  pt  Name of Test (Lab/Mammo/Etc):  polyps   Date of Test:  4/6/22  Ordering Provider:  Tobi  Where the test was performed:  [facility]  Best Call Back Number:  [phone]  Additional Information:  pt is calling the office to speak with the nurse asap today. Please call back and advise.

## 2022-04-13 NOTE — TELEPHONE ENCOUNTER
----- Message from Rafael Fletcher sent at 4/13/2022  2:41 PM CDT -----  Contact: pt at [phone]  Type:  Test Results    Who Called:  pt  Name of Test (Lab/Mammo/Etc):  labs and US  Date of Test:  4/6/22  Ordering Provider:  Lucita  Where the test was performed:  [facility]  Best Call Back Number:  [phone]  Additional Information:  pt is calling the office to speak with the nurse asap today. Please call back and advise.

## 2022-04-14 ENCOUNTER — TELEPHONE (OUTPATIENT)
Dept: FAMILY MEDICINE | Facility: CLINIC | Age: 77
End: 2022-04-14
Payer: MEDICARE

## 2022-04-14 NOTE — TELEPHONE ENCOUNTER
Patient notified by phone of results and verbalized understanding.  Copy of labs mailed to patient.

## 2022-04-14 NOTE — TELEPHONE ENCOUNTER
----- Message from Jessica Garcia sent at 4/14/2022  9:17 AM CDT -----  Contact: patient  Type:  Test Results    Who Called:  patient  Name of Test (Lab/Mammo/Etc):  labs  Date of Test:  4/6  Ordering Provider:  Lucia  Where the test was performed:    Best Call Back Number: [phone]  Additional Information:

## 2022-04-14 NOTE — TELEPHONE ENCOUNTER
Your blood sugar is in the high normal range at 106, this is considered pre diabetes.  Your kidney function and electrolytes look good.  The calcium is a little low still but it has increased and improved.  Your albumin is a little low likely due to your fight with thyroid cancer.  The alkaline phosphatase liver enzyme is a little low as well but stable and is not a problem.  The rest the your liver panel looks good.  The A1c of 5.8 indicates overall blood sugar control is in the prediabetic range and very close to normal.     Your cholesterol levels look good with no changes needed in the Crestor 20 mg.     Your urine protein leakage was so low that it could not be measured so the low albumin is not coming from kidney damage.     Everything looks pretty good and no changes are needed.

## 2022-04-19 ENCOUNTER — IMMUNIZATION (OUTPATIENT)
Dept: PRIMARY CARE CLINIC | Facility: CLINIC | Age: 77
End: 2022-04-19
Payer: MEDICARE

## 2022-04-19 ENCOUNTER — OFFICE VISIT (OUTPATIENT)
Dept: OPHTHALMOLOGY | Facility: CLINIC | Age: 77
End: 2022-04-19
Payer: MEDICARE

## 2022-04-19 ENCOUNTER — TELEPHONE (OUTPATIENT)
Dept: FAMILY MEDICINE | Facility: CLINIC | Age: 77
End: 2022-04-19
Payer: MEDICARE

## 2022-04-19 ENCOUNTER — TELEPHONE (OUTPATIENT)
Dept: NEUROLOGY | Facility: CLINIC | Age: 77
End: 2022-04-19
Payer: MEDICARE

## 2022-04-19 DIAGNOSIS — H25.813 COMBINED FORMS OF AGE-RELATED CATARACT, BILATERAL: ICD-10-CM

## 2022-04-19 DIAGNOSIS — E11.9 DIABETES MELLITUS TYPE 2 WITHOUT RETINOPATHY: ICD-10-CM

## 2022-04-19 DIAGNOSIS — Z23 NEED FOR VACCINATION: Primary | ICD-10-CM

## 2022-04-19 DIAGNOSIS — H40.013 OPEN ANGLE WITH BORDERLINE FINDINGS AND LOW GLAUCOMA RISK IN BOTH EYES: Primary | ICD-10-CM

## 2022-04-19 PROCEDURE — 99999 PR PBB SHADOW E&M-EST. PATIENT-LVL III: CPT | Mod: PBBFAC,,, | Performed by: OPHTHALMOLOGY

## 2022-04-19 PROCEDURE — 91305 COVID-19, MRNA, LNP-S, PF, 30 MCG/0.3 ML DOSE VACCINE (PFIZER): CPT | Mod: S$GLB,,, | Performed by: FAMILY MEDICINE

## 2022-04-19 PROCEDURE — 99999 PR PBB SHADOW E&M-EST. PATIENT-LVL III: ICD-10-PCS | Mod: PBBFAC,,, | Performed by: OPHTHALMOLOGY

## 2022-04-19 PROCEDURE — 91305 COVID-19, MRNA, LNP-S, PF, 30 MCG/0.3 ML DOSE VACCINE (PFIZER): ICD-10-PCS | Mod: S$GLB,,, | Performed by: FAMILY MEDICINE

## 2022-04-19 PROCEDURE — 0054A COVID-19, MRNA, LNP-S, PF, 30 MCG/0.3 ML DOSE VACCINE (PFIZER): ICD-10-PCS | Mod: S$GLB,,, | Performed by: FAMILY MEDICINE

## 2022-04-19 PROCEDURE — 1126F PR PAIN SEVERITY QUANTIFIED, NO PAIN PRESENT: ICD-10-PCS | Mod: CPTII,S$GLB,, | Performed by: OPHTHALMOLOGY

## 2022-04-19 PROCEDURE — 1126F AMNT PAIN NOTED NONE PRSNT: CPT | Mod: CPTII,S$GLB,, | Performed by: OPHTHALMOLOGY

## 2022-04-19 PROCEDURE — 1159F MED LIST DOCD IN RCRD: CPT | Mod: CPTII,S$GLB,, | Performed by: OPHTHALMOLOGY

## 2022-04-19 PROCEDURE — 3288F PR FALLS RISK ASSESSMENT DOCUMENTED: ICD-10-PCS | Mod: CPTII,S$GLB,, | Performed by: OPHTHALMOLOGY

## 2022-04-19 PROCEDURE — 92014 COMPRE OPH EXAM EST PT 1/>: CPT | Mod: S$GLB,,, | Performed by: OPHTHALMOLOGY

## 2022-04-19 PROCEDURE — 1159F PR MEDICATION LIST DOCUMENTED IN MEDICAL RECORD: ICD-10-PCS | Mod: CPTII,S$GLB,, | Performed by: OPHTHALMOLOGY

## 2022-04-19 PROCEDURE — 1160F RVW MEDS BY RX/DR IN RCRD: CPT | Mod: CPTII,S$GLB,, | Performed by: OPHTHALMOLOGY

## 2022-04-19 PROCEDURE — 3288F FALL RISK ASSESSMENT DOCD: CPT | Mod: CPTII,S$GLB,, | Performed by: OPHTHALMOLOGY

## 2022-04-19 PROCEDURE — 92014 PR EYE EXAM, EST PATIENT,COMPREHESV: ICD-10-PCS | Mod: S$GLB,,, | Performed by: OPHTHALMOLOGY

## 2022-04-19 PROCEDURE — 1160F PR REVIEW ALL MEDS BY PRESCRIBER/CLIN PHARMACIST DOCUMENTED: ICD-10-PCS | Mod: CPTII,S$GLB,, | Performed by: OPHTHALMOLOGY

## 2022-04-19 PROCEDURE — 1101F PR PT FALLS ASSESS DOC 0-1 FALLS W/OUT INJ PAST YR: ICD-10-PCS | Mod: CPTII,S$GLB,, | Performed by: OPHTHALMOLOGY

## 2022-04-19 PROCEDURE — 92133 CPTRZD OPH DX IMG PST SGM ON: CPT | Mod: S$GLB,,, | Performed by: OPHTHALMOLOGY

## 2022-04-19 PROCEDURE — 92133 POSTERIOR SEGMENT OCT OPTIC NERVE(OCULAR COHERENCE TOMOGRAPHY) - OU - BOTH EYES: ICD-10-PCS | Mod: S$GLB,,, | Performed by: OPHTHALMOLOGY

## 2022-04-19 PROCEDURE — 2023F PR DILATED RETINAL EXAM W/O EVID OF RETINOPATHY: ICD-10-PCS | Mod: CPTII,S$GLB,, | Performed by: OPHTHALMOLOGY

## 2022-04-19 PROCEDURE — 0054A COVID-19, MRNA, LNP-S, PF, 30 MCG/0.3 ML DOSE VACCINE (PFIZER): CPT | Mod: S$GLB,,, | Performed by: FAMILY MEDICINE

## 2022-04-19 PROCEDURE — 1101F PT FALLS ASSESS-DOCD LE1/YR: CPT | Mod: CPTII,S$GLB,, | Performed by: OPHTHALMOLOGY

## 2022-04-19 PROCEDURE — 2023F DILAT RTA XM W/O RTNOPTHY: CPT | Mod: CPTII,S$GLB,, | Performed by: OPHTHALMOLOGY

## 2022-04-19 NOTE — TELEPHONE ENCOUNTER
----- Message from Twila Diamond sent at 4/19/2022  8:30 AM CDT -----  Regarding: rescheduling appointment  Contact: patient  Patient want to speak with a nurse regarding rescheduling upcoming appointment, no availability please call back at [phone] (home)     Case number [id number]

## 2022-04-19 NOTE — PROGRESS NOTES
HPI     DLS: 10/18/2021- OCTN IOP/ DFE     At's PRN. Denies pain/ FOL. No new floaters. No new complaints. DM has   stable.     Hemoglobin A1C       Date                     Value               Ref Range             Status                04/06/2022               5.8 (H)             4.0 - 5.6 %           Final                 Last edited by Karyn Burgos on 4/19/2022 10:10 AM. (History)            Assessment /Plan     For exam results, see Encounter Report.    Open angle with borderline findings and low glaucoma risk in both eyes    Combined forms of age-related cataract, bilateral    Diabetes mellitus type 2 without retinopathy      1. Open angle with borderline findings and low glaucoma risk in both eyes  Mildly thin pachy  +famhx Aunt    Mild increased CDR OD>OS  IOP is normal  OCT normal and stable OU    Very Low risk  F/u 1 year, DFE, HVF    2. Combined forms of age-related cataract, bilateral  Early VS, pt not bothered  Observe    3. Diabetes mellitus type 2 without retinopathy  Diabetes without retinopathy, discussed with patient importance of glucose control and follow up.  Patient voices understanding.

## 2022-04-19 NOTE — TELEPHONE ENCOUNTER
----- Message from Soo Villegas LPN sent at 4/19/2022  4:30 PM CDT -----  Contact: Self    ----- Message -----  From: Lior Prater MA  Sent: 4/19/2022   4:00 PM CDT  To: Beatriz [facility]o Clinical Staff      ----- Message -----  From: Mack Guerra  Sent: 4/19/2022   3:34 PM CDT  To: [facility] Staff    Type: Needs Medical Advice  Who Called:  Patient  Symptoms (please be specific):  Stomach issues, bubbling    Pharmacy name and phone #:    [id number] - [place] - [address]  [place] [address]  Phone: [phone] Fax: [phone]      Best Call Back Number: [phone]  Additional Information:  States phazyme BiD aren't relieving symptoms.  Would like to discuss with office.   Would also like results of 4/6 specimen sent to lab    Would like ok to take lansoprazole (PREVACID) 30 MG capsule

## 2022-04-19 NOTE — TELEPHONE ENCOUNTER
Call placed to patient regarding attached message. Patient advised message was intended for Dr. Britton in Gastroenterology. Will forward message to appropriate staff box now. Please follow up with patient at your earliest convenience. Thank you.

## 2022-04-20 NOTE — TELEPHONE ENCOUNTER
Patient instructed she should still be on the prevacid and refill called into [facility] so she can restart  patient also advised Dr. Britton recommended IBGard and she can get over the copunter and start also. To try this for a few weeks and let me know how she is doing.

## 2022-04-21 ENCOUNTER — CLINICAL SUPPORT (OUTPATIENT)
Dept: REHABILITATION | Facility: HOSPITAL | Age: 77
End: 2022-04-21
Attending: ORTHOPAEDIC SURGERY
Payer: MEDICARE

## 2022-04-21 DIAGNOSIS — M53.82 DECREASED RANGE OF MOTION OF INTERVERTEBRAL DISCS OF CERVICAL SPINE: Primary | ICD-10-CM

## 2022-04-21 DIAGNOSIS — R29.898 DECREASED STRENGTH OF UPPER EXTREMITY: ICD-10-CM

## 2022-04-21 PROCEDURE — 97140 MANUAL THERAPY 1/> REGIONS: CPT | Mod: KX,PN

## 2022-04-21 PROCEDURE — 97110 THERAPEUTIC EXERCISES: CPT | Mod: KX,PN

## 2022-04-21 NOTE — PROGRESS NOTES
Formerly [facility]  Outpatient Physical Therapy Daily Treatment Note      Name: Erica Masterson  Clinic Number: [id number]  Visit Date: 4/21/2022    Therapy Diagnosis:   Encounter Diagnoses   Name Primary?    Decreased range of motion of intervertebral discs of cervical spine Yes    Decreased strength of upper extremity        Physician: Adriel Iverson,*   Physician Orders: PT Eval and Treat   Medical Diagnosis from Referral: M25.512 (ICD-10-CM) - Left shoulder pain, unspecified chronicity  Evaluation Date: 1/24/2022  Authorization Period Expiration: 2/27/2022  Plan of Care Expiration: 4/25/2022  Progress Note Due: 2/24/2022  Visit # / Visits authorized: 1/4  Total Visits: 16  FOTO: 0/3     Precautions: Standard, Diabetes, cancer and thyroidectomy      Time In: 1400  Time Out: 1439  Total Appointment Time (timed & untimed codes): 39 minutes      Subjective     Pt reports: her pain is down to a 2/10 she thinks she is getting there. She is riding her bike in the evenings and she is back at her exercise group on Mondays, Wednesdays, and Fridays.     Response to previous treatment: Positive    Functional change: None stated      Pain: 2 /10  Location: left neck  and shoulder      Objective     Objective measures:     None today    Erica received therapeutic exercises to develop strength, endurance, ROM, flexibility, posture and core stabilization for 31 minutes including:    Seated thoracic extension over towel roll x20 with 3s hold   Open books x15 each side   Prone T isometric 3x6 with 3-5s hold Left upper extremity only  Prone Y isometric 3x6 with 3-5s hold Left upper extremity only   Single arm row with blue theratube 3x12   Single arm row from 120 degrees shoulder flexion with blue theratube 3x12   Touchdowns level II 2x10 with 3s hold   Bilateral shoulder external rotation with green theraband 3x12     Not today:   UBE 2 min FWD and BWD each way   Supine pect  stretch over half bolster x 3 min   Standing shrug 3lb DB 2 x 10   Seated no money ER YTB 20x   Rocket tape with 3 I strips applied for scapular retraction/posture, upper trap facilitation, levator inhibition   Standing shoulder ER and flexion YTB 2 x 10   Standing shoulder extension RTB 2 x 10     Manual therapy including joint mobilizations to the CT Junction and thoracic spine for 8 minutes:     CT gapping mobilization grade III   Thoracic Pas grade III       Erica participated in neuromuscular re-education activities to improve: Coordination, Kinesthetic, Sense, Proprioception and Posture for 0 minutes. The following activities were included:      Standing serratus punch YTB 2 x 10  SL open book each way 10x    Supine chin retraction into towel 15x  Supine chin retraction with tuck and lift 15x  Seated chin retraction 15x     NP:  Prone row 3lb DB 2 x 10   Prone Y 2 x 10   Attempted prone T and then SL horizontal ABD, significant winging so performed in gravity eliminated position in standing against wall  2 x 15 reps - added to HEP  Standing lift off for lower trap activation 2 x 15 reps -  Added to HEP      Patient Education and HEP     She was compliant with home exercise program.    Education provided:   - Continue Home Exercise Program     Written Home Exercises Provided: Patient instructed to cont prior HEP.  Exercises were reviewed and Erica was able to demonstrate them prior to the end of the session.  Erica demonstrated good  understanding of the education provided.     See EMR under Patient Instructions for exercises provided prior visit.    Assessment     Erica tolerated treatment well. She continues with difficulty of prone trapezius exercises, however is able to perform upper trap training on wall with increased ease. Will continue to progress strength training as able to maximize function.     Erica Is progressing well towards her goals.   Pt prognosis is Good.     Pt's spiritual, cultural and  educational needs considered and pt agreeable to plan of care and goals.    Anticipated barriers to physical therapy: history of Thyroid surgery    Goals:    STG  Weeks/Visits Date Established  Goal Status    1. Pt will increase cervical extension AROM to >/= 45 deg to improve her ability to look up  5 weeks/ 10 visits  1/24/2022    MET 2/25/2022   2. Pt will increase cervical AROM rotation by 10 deg to improve ability to look over shoulder while driving  5 weeks/ 10 visits  1/24/2022    Partially MET  4/21/2022      3.Pt will report improvement in quality of sleep with decreased reports of night pain since IE to improve quality of life 5 weeks/ 10 visits  1/24/2022    MET ( per pt she is able to get longer stretches of sleep than prior to IE) 2/25/2022   4. FOTO goal 5 weeks/ 10 visits  1/24/2022    MET 2/25/2022   5.Pt will demonstrate and verbalize compliance and independence with HEP to improve therapy outcomes  5 weeks/ 10 visits  1/24/2022    In progress  4/21/2022      6. Pt will report </= 5/10 current pain and </= 7/10 pain for at worst pain over the past week to improve activity tolerance  5 weeks/ 10 visits  1/24/2022    MET 2/25/2022      LTG Weeks/Visits Date Established  Goal Status    1.Pt will increase LUE strength to >/= 4/5 to improve ability to lift objects  10 weeks/ 16 visits  1/24/2022    MET 2/25/2022   2. Pt will report decreased tightness of neck in the morning to improve functional mobility  10 weeks/ 16 visits  1/24/2022       In progress  4/21/2022      3.FOTO goal 10 weeks/ 16 visits  1/24/2022    In progress  4/21/2022      4. Pt will demonstrate improved seated posture to improve  10 weeks/ 16 visits  1/24/2022     In progress  4/21/2022     5.Pt will report </= 2/10 current pain and </= 4/10 pain for at worst pain over the past week to improve activity tolerance  10 weeks/ 16 visits  1/24/2022 In progress  4/21/2022       Plan     Reasons for Recertification of Therapy:   subjective  complaints of tightness in her Left upper trap/pec and demonstrartes weakness of her Left trapezius musculature and serratus anterior as well as impaired posture.    Updated Certification Period: 4/21/2022 to 06/11/2022  Recommended Treatment Plan: 1 times per week for 6 weeks: Neuromuscular Re-ed, Patient Education, Therapeutic Activities and Therapeutic Exercise  Other Recommendations:     Noah Sanchez, PT  4/21/2022      I CERTIFY THE NEED FOR THESE SERVICES FURNISHED UNDER THIS PLAN OF TREATMENT AND WHILE UNDER MY CARE    Physician's comments:        Physician's Signature: ___________________________________________________        Noah Sanchez PT

## 2022-04-22 ENCOUNTER — TELEPHONE (OUTPATIENT)
Dept: ENDOCRINOLOGY | Facility: CLINIC | Age: 77
End: 2022-04-22
Payer: MEDICARE

## 2022-04-22 NOTE — TELEPHONE ENCOUNTER
----- Message from Rowena Sarah MA sent at 4/22/2022  9:31 AM CDT -----  Contact: patient  Please advise asap  ----- Message -----  From: Jessica Garcia  Sent: 4/22/2022   8:13 AM CDT  To: Lucita [facility] Staff    Type: Needs Medical Advice  Who Called:  patient  Symptoms (please be specific):  dizziness, sweating, stomach cramping, skin tingling and burning, foul taste in mouth, can't sleep, stinging and burning around rectum and vaginal area  How long has patient had these symptoms:  since starting levothyroxine (SYNTHROID) 100 MCG tablet  Best Call Back Number: [phone]  Additional Information: requesting a call back as soon as possible

## 2022-04-22 NOTE — TELEPHONE ENCOUNTER
Informed pt. of message from Dr. Landrum. Pt verbalized an understanding reporting she will see if she can get in with her PCP today. If not, she will go to an Urgent Care.

## 2022-04-22 NOTE — TELEPHONE ENCOUNTER
Lab Results   Component Value Date    TSH 0.010 (L) 03/21/2022    FREET4 1.22 03/21/2022     Levothyroxine 100 since then, lower dose. Shouldn't cause any of those symptoms.

## 2022-04-25 ENCOUNTER — OFFICE VISIT (OUTPATIENT)
Dept: GASTROENTEROLOGY | Facility: CLINIC | Age: 77
End: 2022-04-25
Payer: MEDICARE

## 2022-04-25 VITALS
BODY MASS INDEX: 23.92 KG/M2 | DIASTOLIC BLOOD PRESSURE: 78 MMHG | SYSTOLIC BLOOD PRESSURE: 141 MMHG | WEIGHT: 148.81 LBS | HEIGHT: 66 IN | HEART RATE: 98 BPM

## 2022-04-25 DIAGNOSIS — K29.70 GASTRITIS DETERMINED BY ENDOSCOPY: ICD-10-CM

## 2022-04-25 DIAGNOSIS — R93.3 ABNORMAL BARIUM SWALLOW: ICD-10-CM

## 2022-04-25 DIAGNOSIS — K44.9 HIATAL HERNIA: ICD-10-CM

## 2022-04-25 DIAGNOSIS — R09.A2 GLOBUS SENSATION: ICD-10-CM

## 2022-04-25 DIAGNOSIS — R10.13 EPIGASTRIC PAIN: ICD-10-CM

## 2022-04-25 DIAGNOSIS — K21.9 GASTROESOPHAGEAL REFLUX DISEASE, UNSPECIFIED WHETHER ESOPHAGITIS PRESENT: ICD-10-CM

## 2022-04-25 DIAGNOSIS — R11.0 NAUSEA: Primary | ICD-10-CM

## 2022-04-25 DIAGNOSIS — R13.10 DYSPHAGIA, UNSPECIFIED TYPE: ICD-10-CM

## 2022-04-25 DIAGNOSIS — Z86.010 HISTORY OF COLON POLYPS: ICD-10-CM

## 2022-04-25 DIAGNOSIS — K59.00 CONSTIPATION, UNSPECIFIED CONSTIPATION TYPE: ICD-10-CM

## 2022-04-25 PROCEDURE — 99214 OFFICE O/P EST MOD 30 MIN: CPT | Mod: S$GLB,,,

## 2022-04-25 PROCEDURE — 1126F PR PAIN SEVERITY QUANTIFIED, NO PAIN PRESENT: ICD-10-PCS | Mod: CPTII,S$GLB,,

## 2022-04-25 PROCEDURE — 3078F PR MOST RECENT DIASTOLIC BLOOD PRESSURE < 80 MM HG: ICD-10-PCS | Mod: CPTII,S$GLB,,

## 2022-04-25 PROCEDURE — 3077F PR MOST RECENT SYSTOLIC BLOOD PRESSURE >= 140 MM HG: ICD-10-PCS | Mod: CPTII,S$GLB,,

## 2022-04-25 PROCEDURE — 99214 PR OFFICE/OUTPT VISIT, EST, LEVL IV, 30-39 MIN: ICD-10-PCS | Mod: S$GLB,,,

## 2022-04-25 PROCEDURE — 1159F PR MEDICATION LIST DOCUMENTED IN MEDICAL RECORD: ICD-10-PCS | Mod: CPTII,S$GLB,,

## 2022-04-25 PROCEDURE — 1159F MED LIST DOCD IN RCRD: CPT | Mod: CPTII,S$GLB,,

## 2022-04-25 PROCEDURE — 1160F RVW MEDS BY RX/DR IN RCRD: CPT | Mod: CPTII,S$GLB,,

## 2022-04-25 PROCEDURE — 3077F SYST BP >= 140 MM HG: CPT | Mod: CPTII,S$GLB,,

## 2022-04-25 PROCEDURE — 1101F PT FALLS ASSESS-DOCD LE1/YR: CPT | Mod: CPTII,S$GLB,,

## 2022-04-25 PROCEDURE — 99999 PR PBB SHADOW E&M-EST. PATIENT-LVL V: ICD-10-PCS | Mod: PBBFAC,,,

## 2022-04-25 PROCEDURE — 3288F PR FALLS RISK ASSESSMENT DOCUMENTED: ICD-10-PCS | Mod: CPTII,S$GLB,,

## 2022-04-25 PROCEDURE — 3288F FALL RISK ASSESSMENT DOCD: CPT | Mod: CPTII,S$GLB,,

## 2022-04-25 PROCEDURE — 3078F DIAST BP <80 MM HG: CPT | Mod: CPTII,S$GLB,,

## 2022-04-25 PROCEDURE — 1160F PR REVIEW ALL MEDS BY PRESCRIBER/CLIN PHARMACIST DOCUMENTED: ICD-10-PCS | Mod: CPTII,S$GLB,,

## 2022-04-25 PROCEDURE — 99999 PR PBB SHADOW E&M-EST. PATIENT-LVL V: CPT | Mod: PBBFAC,,,

## 2022-04-25 PROCEDURE — 1126F AMNT PAIN NOTED NONE PRSNT: CPT | Mod: CPTII,S$GLB,,

## 2022-04-25 PROCEDURE — 1101F PR PT FALLS ASSESS DOC 0-1 FALLS W/OUT INJ PAST YR: ICD-10-PCS | Mod: CPTII,S$GLB,,

## 2022-04-25 RX ORDER — SUCRALFATE 1 G/1
1 TABLET ORAL
Qty: 40 TABLET | Refills: 0 | Status: SHIPPED | OUTPATIENT
Start: 2022-04-25 | End: 2022-05-05

## 2022-04-25 RX ORDER — PANTOPRAZOLE SODIUM 40 MG/1
40 TABLET, DELAYED RELEASE ORAL DAILY
Qty: 30 TABLET | Refills: 2 | Status: SHIPPED | OUTPATIENT
Start: 2022-04-25 | End: 2022-08-18

## 2022-04-25 RX ORDER — LINACLOTIDE 290 UG/1
290 CAPSULE, GELATIN COATED ORAL DAILY
Qty: 30 CAPSULE | Refills: 0 | Status: SHIPPED | OUTPATIENT
Start: 2022-04-25 | End: 2022-04-25 | Stop reason: DRUGHIGH

## 2022-04-25 NOTE — PROGRESS NOTES
Subjective:       Patient ID: Erica Masterson is a 76 y.o. female Body mass index is 24.02 kg/m².    Chief Complaint: Nausea    Established patient of Dr. Britton and myself.     GI Problem  The primary symptoms include abdominal pain and nausea (chief complaint: started months ago; frequent; currently taking zofran PRN with relief; reports sitting upright and erect helps improve symptom as well). Primary symptoms do not include fever, weight loss, fatigue, vomiting, diarrhea, melena, hematemesis, jaundice, hematochezia, dysuria, arthralgias or rash.   The abdominal pain began more than 2 days ago. The abdominal pain is located in the epigastric region (reports pain starts in stomach then radiates to throat). Pain radiation: throat. The severity of the abdominal pain is 4/10 (constant; worse at night; described as a nauseated, weakness, & bubbling vibration in stomach). The abdominal pain is relieved by certain positions (sitting upright and erect).   The illness is also significant for dysphagia (globus sensation - feels like a lump is in her throat; reports after swallowing she experiences her food coming back up and getting stuck; history of thyroid cancer) and constipation (reports having 2 bowel movements day rated 4 on [place]en scale; doesn't feel empty after bowel movement; denies straining; currently taking Miralax once daily; past treatment: Linzess PRN - stopped after a few days r/t diarrhea). The illness does not include chills, anorexia, odynophagia, bloating, back pain or itching. Associated symptoms comments: Abdominal CT 03/27/2022 - Findings suspicious for colonic constipation with increased stool burden in the interval. Significant associated medical issues include GERD (denies heartburn; reports experiencing reflux frequently; currently taking Prevacid 30 mg daily). Associated medical issues do not include inflammatory bowel disease, gallstones, liver disease, alcohol abuse, PUD, gastric bypass,  bowel resection, irritable bowel syndrome, hemorrhoids or diverticulitis.     Review of Systems   Constitutional: Negative for activity change, appetite change, chills, diaphoresis, fatigue, fever, unexpected weight change and weight loss.   HENT: Positive for trouble swallowing. Negative for sore throat.    Respiratory: Negative for cough, choking and shortness of breath.    Cardiovascular: Negative for chest pain.   Gastrointestinal: Positive for abdominal pain, constipation (reports having 2 bowel movements day rated 4 on [place] scale; doesn't feel empty after bowel movement; denies straining; currently taking Miralax once daily; past treatment: Linzess PRN - stopped after a few days r/t diarrhea), dysphagia (globus sensation - feels like a lump is in her throat; reports after swallowing she experiences her food coming back up and getting stuck; history of thyroid cancer) and nausea (chief complaint: started months ago; frequent; currently taking zofran PRN with relief; reports sitting upright and erect helps improve symptom as well). Negative for abdominal distention, anal bleeding, anorexia, bloating, blood in stool, diarrhea, hematemesis, hematochezia, jaundice, melena, rectal pain and vomiting.   Genitourinary: Negative for dysuria.   Musculoskeletal: Negative for arthralgias and back pain.   Skin: Negative for itching and rash.       No LMP recorded. Patient has had a hysterectomy.  Past Medical History:   Diagnosis Date    Allergy     Anxiety     Cancer     Cataract     Colon polyp     Degenerative arthritis of knee 04/03/2012    Diverticulosis     Encounter for blood transfusion     GERD (gastroesophageal reflux disease)     History of thyroid cancer 2021    Hyperlipidemia     Hypertension     Multinodular goiter 03/26/2012    Myopathy, unspecified 01/18/2010    Tuberculosis      Past Surgical History:   Procedure Laterality Date    BREAST BIOPSY Left 2015    benign    CHOLECYSTECTOMY       COLONOSCOPY  12/2/15    Dr. Britton, multiple polyps, recheck five years-three years if more than 2 polyps are adenomatous    COLONOSCOPY N/A 12/2/2015    COLONOSCOPY N/A 3/20/2019    Procedure: COLONOSCOPY;  Surgeon: Jose Britton MD;  Location: [facility];  Service: Endoscopy;  Laterality: N/A;    COLONOSCOPY N/A 5/20/2020    Dr. Britton; internal hemorrhoids; diverticulosis; polyps removed; repeat in 3 years    CYSTOSCOPY N/A 8/26/2020    Procedure: CYSTOSCOPY;  Surgeon: Charlotte Walters Jr., MD;  Location: [facility] OR;  Service: Urology;  Laterality: N/A;    DISSECTION OF NECK Left 9/13/2021    Procedure: DISSECTION, NECK;  Surgeon: Ryan Torres MD;  [facility];  Service: ENT;  Laterality: Left;    ESOPHAGOGASTRODUODENOSCOPY N/A 5/20/2020    Dr. Britton; small hiatal hernia; gastritis; 8 gastric polyps removed    ESOPHAGOGASTRODUODENOSCOPY N/A 4/1/2022    Procedure: EGD (ESOPHAGOGASTRODUODENOSCOPY);  Surgeon: Jose Britton MD;  Location: [facility];  Service: Endoscopy;  Laterality: N/A;    FOOT SURGERY      HYSTERECTOMY      TVH/BSO > 20 years    INTRALUMINAL GASTROINTESTINAL TRACT IMAGING VIA CAPSULE N/A 6/4/2020    Procedure: IMAGING PROCEDURE, GI TRACT, INTRALUMINAL, VIA CAPSULE;  Surgeon: Jose Britton MD;  Location: [facility];  Service: Endoscopy;  Laterality: N/A;    KNEE SURGERY  06/06/2013    right knee tear Dr Iverson     LAPAROSCOPIC CHOLECYSTECTOMY N/A 9/17/2020    Procedure: CHOLECYSTECTOMY, LAPAROSCOPIC;  Surgeon: Forrest Veronica MD;  [facility];  Service: General;  Laterality: N/A;    LUNG LOBECTOMY  1966    right middle lobectomy, due to Tb    OOPHORECTOMY      SHOULDER SURGERY      francis     THYROIDECTOMY Bilateral 5/19/2021    Procedure: THYROIDECTOMY;  Surgeon: Jamia Amezquita MD;  [facility];  Service: General;  Laterality: Bilateral;    THYROIDECTOMY Bilateral 9/13/2021    Procedure: THYROIDECTOMY WITH INTRA-OP PTH;   Surgeon: Ryan Torres MD;  Location[facility] OR [address] [place] [address];  Service: ENT;  Laterality: Bilateral;  NIM tube  Intraop PTH     Family History   Problem Relation Age of Onset    Colon cancer Other     Cancer Mother     Hypertension Mother     Hyperlipidemia Mother     Cancer Brother     Hypertension Father     Emphysema Father     Diabetes Son     Hypertension Son     Alcohol abuse Son     Diabetes Maternal Aunt     Alzheimer's disease Maternal Uncle     Cancer Maternal Grandmother     No Known Problems Daughter     Dementia Sister     Alzheimer's disease Sister     Breast cancer Sister 60    Obesity Paternal Uncle     Prostate cancer Other     Melanoma Neg Hx     Psoriasis Neg Hx     Lupus Neg Hx     Eczema Neg Hx     Amblyopia Neg Hx     Blindness Neg Hx     Cataracts Neg Hx     Glaucoma Neg Hx     Macular degeneration Neg Hx     Retinal detachment Neg Hx     Strabismus Neg Hx     Stroke Neg Hx     Thyroid cancer Neg Hx     Colon polyps Neg Hx     Ulcerative colitis Neg Hx     Stomach cancer Neg Hx     Rectal cancer Neg Hx      Social History     Tobacco Use    Smoking status: Never Smoker    Smokeless tobacco: Never Used   Substance Use Topics    Alcohol use: Not Currently     Comment: stopped 2019    Drug use: No     Wt Readings from Last 10 Encounters:   04/25/22 67.5 kg (148 lb 13 oz)   04/04/22 68.2 kg (150 lb 7.4 oz)   03/29/22 69.5 kg (153 lb 3.5 oz)   03/28/22 66.7 kg (147 lb)   03/27/22 66.7 kg (147 lb)   03/22/22 67.5 kg (148 lb 13 oz)   03/16/22 67.7 kg (149 lb 4 oz)   03/16/22 67.6 kg (149 lb)   03/15/22 67.9 kg (149 lb 11.1 oz)   03/03/22 66.7 kg (147 lb)     Lab Results   Component Value Date    WBC 4.72 03/27/2022    HGB 12.1 03/27/2022    HCT 37.7 03/27/2022    MCV 91 03/27/2022     03/27/2022     CMP  Sodium   Date Value Ref Range Status   04/06/2022 142 136 - 145 mmol/L Final     Potassium   Date Value Ref Range Status   04/06/2022 4.4 3.5 - 5.1 mmol/L  Final     Chloride   Date Value Ref Range Status   04/06/2022 106 95 - 110 mmol/L Final     CO2   Date Value Ref Range Status   04/06/2022 23 23 - 29 mmol/L Final     Glucose   Date Value Ref Range Status   04/06/2022 106 70 - 110 mg/dL Final     BUN   Date Value Ref Range Status   04/06/2022 19 8 - 23 mg/dL Final     Creatinine   Date Value Ref Range Status   04/06/2022 1.0 0.5 - 1.4 mg/dL Final   06/04/2013 0.9 0.5 - 1.4 mg/dL Final     Calcium   Date Value Ref Range Status   04/06/2022 8.4 (L) 8.7 - 10.5 mg/dL Final   06/04/2013 9.7 8.7 - 10.5 mg/dL Final     Total Protein   Date Value Ref Range Status   04/06/2022 7.0 6.0 - 8.4 g/dL Final     Albumin   Date Value Ref Range Status   04/06/2022 3.4 (L) 3.5 - 5.2 g/dL Final     Total Bilirubin   Date Value Ref Range Status   04/06/2022 0.4 0.1 - 1.0 mg/dL Final     Comment:     For infants and newborns, interpretation of results should be based  on gestational age, weight and in agreement with clinical  observations.    Premature Infant recommended reference ranges:  Up to 24 hours.............<8.0 mg/dL  Up to 48 hours............<12.0 mg/dL  3-5 days..................<15.0 mg/dL  6-29 days.................<15.0 mg/dL       Alkaline Phosphatase   Date Value Ref Range Status   04/06/2022 39 (L) 55 - 135 U/L Final     AST   Date Value Ref Range Status   04/06/2022 20 10 - 40 U/L Final     ALT   Date Value Ref Range Status   04/06/2022 19 10 - 44 U/L Final     Anion Gap   Date Value Ref Range Status   04/06/2022 13 8 - 16 mmol/L Final   06/04/2013 9 5 - 15 meq/L Final     eGFR if    Date Value Ref Range Status   04/06/2022 >60 >60 mL/min/1.73 m^2 Final     eGFR if non    Date Value Ref Range Status   04/06/2022 54 (A) >60 mL/min/1.73 m^2 Final     Comment:     Calculation used to obtain the estimated glomerular filtration  rate (eGFR) is the CKD-EPI equation.        Lab Results   Component Value Date    AMYLASE 106 02/22/2016     Lab  Results   Component Value Date    LIPASE 8 03/27/2022     Lab Results   Component Value Date    TSH 0.010 (L) 03/21/2022       Reviewed prior medical records including radiology report of abdominal CT 03/27/2022, abdominal CT 03/21/2022, abdominal CT 03/16/2022 & endoscopy history of colonoscopy 11/16/2020 & EGD 04/01/2022 (see surgical history).    Objective:      Physical Exam  Vitals and nursing note reviewed.   Constitutional:       General: She is not in acute distress.     Appearance: Normal appearance. She is normal weight. She is not ill-appearing.   HENT:      Mouth/Throat:      Comments: Unable to assess due to COVID-19 concerns.  Eyes:      Extraocular Movements: Extraocular movements intact.      Pupils: Pupils are equal, round, and reactive to light.   Cardiovascular:      Rate and Rhythm: Normal rate and regular rhythm.   Pulmonary:      Effort: Pulmonary effort is normal. No respiratory distress.      Breath sounds: Normal breath sounds.   Abdominal:      General: Abdomen is flat. Bowel sounds are normal. There is no distension or abdominal bruit. There are no signs of injury.      Palpations: There is no shifting dullness, fluid wave, hepatomegaly, splenomegaly or mass.      Tenderness: There is abdominal tenderness in the epigastric area. There is no guarding or rebound. Negative signs include Rashid's sign, Rovsing's sign and McBurney's sign.      Hernia: No hernia is present.   Skin:     General: Skin is warm and dry.      Coloration: Skin is not jaundiced or pale.   Neurological:      Mental Status: She is alert and oriented to person, place, and time.   Psychiatric:         Attention and Perception: Attention normal.         Mood and Affect: Mood normal.         Speech: Speech normal.         Behavior: Behavior normal.         Assessment:       1. Nausea    2. Globus sensation    3. Gastroesophageal reflux disease, unspecified whether esophagitis present    4. Epigastric pain    5. Gastritis  determined by endoscopy    6. Dysphagia, unspecified type    7. Hiatal hernia    8. Constipation, unspecified constipation type    9. History of colon polyps        Plan:       Nausea  -     Fl Modified Barium Swallow Speech; Future; Expected date: 04/25/2022  -Consider GES  -Continue Zofran as directed PRN for nausea    Globus sensation  -     Fl Modified Barium Swallow Speech; Future; Expected date: 04/25/2022    Gastroesophageal reflux disease, unspecified whether esophagitis present  -discussed about the different types of medications used to treat reflux and how to use them, antacids can be used PRN for breakthrough heartburn symptoms by reducing stomach acid that is already produced, H2 blockers work by limiting the amount acid production, & PPI's work to block acid production and are taken daily, patient verbalized understanding.  -Educated patient on lifestyle modifications to help control/reduce reflux/abdominal pain including: avoid large meals, avoid eating within 2-3 hours of bedtime (avoid late night eating & lying down soon after eating), elevate head of bed if nocturnal symptoms are present, smoking cessation (if current smoker), & weight loss (if overweight).   -Educated to avoid known foods which trigger reflux symptoms & to minimize/avoid high-fat foods, chocolate, caffeine, citrus, alcohol, & tomato products.  -Advised to avoid/limit use of NSAID's, since they can cause GI upset, bleeding, and/or ulcers. If needed, take with food.   - START: pantoprazole (PROTONIX) 40 MG tablet; Take 1 tablet (40 mg total) by mouth once daily.  Dispense: 30 tablet; Refill: 2  -STOP PREVACID  -     Fl Modified Barium Swallow Speech; Future; Expected date: 04/25/2022    Epigastric pain & Gastritis determined by endoscopy  - START: sucralfate (CARAFATE) 1 gram tablet; Take 1 tablet (1 g total) by mouth 4 (four) times daily before meals and nightly. for 10 days  Dispense: 40 tablet; Refill: 0    Dysphagia, unspecified  type  -     Fl Modified Barium Swallow Speech; Future; Expected date: 04/25/2022    Hiatal hernia  -discussed diagnosis with patient & that it is usually managed by controlling reflux symptoms, surgery is an option, but usually performed if reflux is uncontrolled by medication management and lifestyle/dietary modifications; if symptoms persist despite medication management and lifestyle/dietary modifications, we can refer to general surgery to consult about surgical options, patient verbalized understanding    Constipation, unspecified constipation type  -REFILL: LINZESS 290 mcg Cap capsule; Take 1 capsule (290 mcg total) by mouth once daily.  Dispense: 30 capsule; Refill: 0  -STOP MIRALAX  -Recommend daily exercise as tolerated, adequate water intake (six 8-oz glasses of water daily), and high fiber diet. OTC fiber supplements are recommended if diet does not reach daily fiber goal (20-30 grams daily), such as Metamucil, Citrucel, or FiberCon (take as directed, separate from other oral medications by >2 hours).    History of colon polyps  Follow-up for surveillance colonoscopy 11/2023    Follow up in about 4 weeks (around 5/23/2022), or if symptoms worsen or fail to improve.      If no improvement in symptoms or symptoms worsen, call/follow-up at clinic or go to ER.        30 minutes of total time spent on the encounter, which includes face to face time and non-face to face time preparing to see the patient (eg, review of tests), Obtaining and/or reviewing separately obtained history, Documenting clinical information in the electronic or other health record, Independently interpreting results (not separately reported) and communicating results to the patient/family/caregiver, or Care coordination (not separately reported).

## 2022-04-26 ENCOUNTER — OFFICE VISIT (OUTPATIENT)
Dept: CARDIOLOGY | Facility: CLINIC | Age: 77
End: 2022-04-26
Payer: MEDICARE

## 2022-04-26 ENCOUNTER — CLINICAL SUPPORT (OUTPATIENT)
Dept: REHABILITATION | Facility: HOSPITAL | Age: 77
End: 2022-04-26
Attending: ORTHOPAEDIC SURGERY
Payer: MEDICARE

## 2022-04-26 VITALS
SYSTOLIC BLOOD PRESSURE: 122 MMHG | OXYGEN SATURATION: 97 % | HEART RATE: 74 BPM | BODY MASS INDEX: 23.95 KG/M2 | HEIGHT: 66 IN | DIASTOLIC BLOOD PRESSURE: 70 MMHG | WEIGHT: 149 LBS

## 2022-04-26 DIAGNOSIS — M53.82 DECREASED RANGE OF MOTION OF INTERVERTEBRAL DISCS OF CERVICAL SPINE: Primary | ICD-10-CM

## 2022-04-26 DIAGNOSIS — R29.898 DECREASED STRENGTH OF UPPER EXTREMITY: ICD-10-CM

## 2022-04-26 DIAGNOSIS — G62.9 NEUROPATHY: ICD-10-CM

## 2022-04-26 DIAGNOSIS — T46.6X5A COMPLICATION OF STATIN THERAPY, INITIAL ENCOUNTER: ICD-10-CM

## 2022-04-26 DIAGNOSIS — I15.2 HYPERTENSION ASSOCIATED WITH DIABETES: Chronic | ICD-10-CM

## 2022-04-26 DIAGNOSIS — E78.5 HYPERLIPIDEMIA ASSOCIATED WITH TYPE 2 DIABETES MELLITUS: ICD-10-CM

## 2022-04-26 DIAGNOSIS — G52.8 SPINAL ACCESSORY NERVE DISORDER: ICD-10-CM

## 2022-04-26 DIAGNOSIS — E11.69 HYPERLIPIDEMIA ASSOCIATED WITH TYPE 2 DIABETES MELLITUS: ICD-10-CM

## 2022-04-26 DIAGNOSIS — I73.9 PAD (PERIPHERAL ARTERY DISEASE): Primary | Chronic | ICD-10-CM

## 2022-04-26 DIAGNOSIS — E11.59 HYPERTENSION ASSOCIATED WITH DIABETES: Chronic | ICD-10-CM

## 2022-04-26 PROCEDURE — 99214 PR OFFICE/OUTPT VISIT, EST, LEVL IV, 30-39 MIN: ICD-10-PCS | Mod: S$GLB,,, | Performed by: SPECIALIST

## 2022-04-26 PROCEDURE — 1159F MED LIST DOCD IN RCRD: CPT | Mod: CPTII,S$GLB,, | Performed by: SPECIALIST

## 2022-04-26 PROCEDURE — 1160F RVW MEDS BY RX/DR IN RCRD: CPT | Mod: CPTII,S$GLB,, | Performed by: SPECIALIST

## 2022-04-26 PROCEDURE — 1101F PT FALLS ASSESS-DOCD LE1/YR: CPT | Mod: CPTII,S$GLB,, | Performed by: SPECIALIST

## 2022-04-26 PROCEDURE — 1126F AMNT PAIN NOTED NONE PRSNT: CPT | Mod: CPTII,S$GLB,, | Performed by: SPECIALIST

## 2022-04-26 PROCEDURE — 1160F PR REVIEW ALL MEDS BY PRESCRIBER/CLIN PHARMACIST DOCUMENTED: ICD-10-PCS | Mod: CPTII,S$GLB,, | Performed by: SPECIALIST

## 2022-04-26 PROCEDURE — 97110 THERAPEUTIC EXERCISES: CPT | Mod: KX,PN

## 2022-04-26 PROCEDURE — 99214 OFFICE O/P EST MOD 30 MIN: CPT | Mod: S$GLB,,, | Performed by: SPECIALIST

## 2022-04-26 PROCEDURE — 3078F DIAST BP <80 MM HG: CPT | Mod: CPTII,S$GLB,, | Performed by: SPECIALIST

## 2022-04-26 PROCEDURE — 3288F FALL RISK ASSESSMENT DOCD: CPT | Mod: CPTII,S$GLB,, | Performed by: SPECIALIST

## 2022-04-26 PROCEDURE — 1101F PR PT FALLS ASSESS DOC 0-1 FALLS W/OUT INJ PAST YR: ICD-10-PCS | Mod: CPTII,S$GLB,, | Performed by: SPECIALIST

## 2022-04-26 PROCEDURE — 3074F SYST BP LT 130 MM HG: CPT | Mod: CPTII,S$GLB,, | Performed by: SPECIALIST

## 2022-04-26 PROCEDURE — 3078F PR MOST RECENT DIASTOLIC BLOOD PRESSURE < 80 MM HG: ICD-10-PCS | Mod: CPTII,S$GLB,, | Performed by: SPECIALIST

## 2022-04-26 PROCEDURE — 3288F PR FALLS RISK ASSESSMENT DOCUMENTED: ICD-10-PCS | Mod: CPTII,S$GLB,, | Performed by: SPECIALIST

## 2022-04-26 PROCEDURE — 3074F PR MOST RECENT SYSTOLIC BLOOD PRESSURE < 130 MM HG: ICD-10-PCS | Mod: CPTII,S$GLB,, | Performed by: SPECIALIST

## 2022-04-26 PROCEDURE — 1126F PR PAIN SEVERITY QUANTIFIED, NO PAIN PRESENT: ICD-10-PCS | Mod: CPTII,S$GLB,, | Performed by: SPECIALIST

## 2022-04-26 PROCEDURE — 1159F PR MEDICATION LIST DOCUMENTED IN MEDICAL RECORD: ICD-10-PCS | Mod: CPTII,S$GLB,, | Performed by: SPECIALIST

## 2022-04-26 PROCEDURE — 97140 MANUAL THERAPY 1/> REGIONS: CPT | Mod: KX,PN

## 2022-04-26 RX ORDER — GABAPENTIN 300 MG/1
300 CAPSULE ORAL 2 TIMES DAILY
Qty: 90 CAPSULE | Refills: 0 | Status: SHIPPED | OUTPATIENT
Start: 2022-04-26 | End: 2022-08-18 | Stop reason: DRUGHIGH

## 2022-04-26 RX ORDER — ROSUVASTATIN CALCIUM 20 MG/1
10 TABLET, COATED ORAL NIGHTLY
Qty: 90 TABLET | Refills: 0 | Status: SHIPPED | OUTPATIENT
Start: 2022-04-26 | End: 2022-09-08

## 2022-04-26 NOTE — PROGRESS NOTES
[facility]  Outpatient Physical Therapy Daily Treatment Note      Name: Erica Masterson  Clinic Number: [id number]  Visit Date: 4/26/2022    Therapy Diagnosis:   Encounter Diagnoses   Name Primary?    Decreased range of motion of intervertebral discs of cervical spine Yes    Decreased strength of upper extremity        Physician: Adriel Iverson,*   Physician Orders: PT Eval and Treat   Medical Diagnosis from Referral: M25.512 (ICD-10-CM) - Left shoulder pain, unspecified chronicity  Evaluation Date: 1/24/2022  Authorization Period Expiration: 2/27/2022  Plan of Care Expiration: 4/25/2022  Progress Note Due: 2/24/2022  Visit # / Visits authorized: 1/4  Total Visits: 16  FOTO: 0/3     Precautions: Standard, Diabetes, cancer and thyroidectomy      Time In: 0901  Time Out: 0945  Total Appointment Time (timed & untimed codes): 44 minutes      Subjective     Pt reports: she is feeling fine today, but was very sore in her Left upper trap after last visit. She describes her symptoms as stiffness    Response to previous treatment: Positive    Functional change: None stated      Pain: 2 /10  Location: left neck  and shoulder      Objective     Objective measures:     None today    Erica received therapeutic exercises to develop strength, endurance, ROM, flexibility, posture and core stabilization for 30 minutes including:    Seated thoracic extension over towel roll x20 with 3s hold   Bilateral row with Green Theratube 3x12   Single arm row from 120 degrees shoulder flexion with 2 green theratube 3x12 each side   Row precor machine 3x12 with no weight     Not today:   Open books x15 each side   Prone T isometric 3x6 with 3-5s hold Left upper extremity only  Prone Y isometric 3x6 with 3-5s hold Left upper extremity only   Touchdowns level II 2x10 with 3s hold   Bilateral shoulder external rotation with green theraband 3x12   UBE 2 min FWD and BWD each way   Supine  pect stretch over half bolster x 3 min   Standing shrug 3lb DB 2 x 10   Seated no money ER YTB 20x   Rocket tape with 3 I strips applied for scapular retraction/posture, upper trap facilitation, levator inhibition   Standing shoulder ER and flexion YTB 2 x 10   Standing shoulder extension RTB 2 x 10     Manual therapy including joint mobilizations to the CT Junction and thoracic spine for 8 minutes:     CT gapping mobilization grade III   Thoracic Pas grade III       Erica participated in neuromuscular re-education activities to improve: Coordination, Kinesthetic, Sense, Proprioception and Posture for 0 minutes. The following activities were included:      Standing serratus punch YTB 2 x 10  SL open book each way 10x    Supine chin retraction into towel 15x  Supine chin retraction with tuck and lift 15x  Seated chin retraction 15x     NP:  Prone row 3lb DB 2 x 10   Prone Y 2 x 10   Attempted prone T and then SL horizontal ABD, significant winging so performed in gravity eliminated position in standing against wall  2 x 15 reps - added to HEP  Standing lift off for lower trap activation 2 x 15 reps -  Added to HEP    Pt received hot pack to Left shoulder x6mins at end of treatment     Patient Education and HEP     She was compliant with home exercise program.    Education provided:   - Continue Home Exercise Program     Written Home Exercises Provided: Patient instructed to cont prior HEP.  Exercises were reviewed and Erica was able to demonstrate them prior to the end of the session.  Erica demonstrated good  understanding of the education provided.     See EMR under Patient Instructions for exercises provided prior visit.    Assessment     Erica tolerated treatment well with focus on strengthening her downward rotators today. We were able to increase the resistance for rows. Will continue to progress as able.     Erica Is progressing well towards her goals.   Pt prognosis is Good.     Pt's spiritual, cultural and  educational needs considered and pt agreeable to plan of care and goals.    Anticipated barriers to physical therapy: history of Thyroid surgery    Goals:    STG  Weeks/Visits Date Established  Goal Status    1. Pt will increase cervical extension AROM to >/= 45 deg to improve her ability to look up  5 weeks/ 10 visits  1/24/2022    MET 2/25/2022   2. Pt will increase cervical AROM rotation by 10 deg to improve ability to look over shoulder while driving  5 weeks/ 10 visits  1/24/2022    Partially MET  4/26/2022      3.Pt will report improvement in quality of sleep with decreased reports of night pain since IE to improve quality of life 5 weeks/ 10 visits  1/24/2022    MET ( per pt she is able to get longer stretches of sleep than prior to IE) 2/25/2022   4. FOTO goal 5 weeks/ 10 visits  1/24/2022    MET 2/25/2022   5.Pt will demonstrate and verbalize compliance and independence with HEP to improve therapy outcomes  5 weeks/ 10 visits  1/24/2022    In progress  4/26/2022      6. Pt will report </= 5/10 current pain and </= 7/10 pain for at worst pain over the past week to improve activity tolerance  5 weeks/ 10 visits  1/24/2022    MET 2/25/2022      LTG Weeks/Visits Date Established  Goal Status    1.Pt will increase LUE strength to >/= 4/5 to improve ability to lift objects  10 weeks/ 16 visits  1/24/2022    MET 2/25/2022   2. Pt will report decreased tightness of neck in the morning to improve functional mobility  10 weeks/ 16 visits  1/24/2022       In progress  4/26/2022      3.FOTO goal 10 weeks/ 16 visits  1/24/2022    In progress  4/26/2022      4. Pt will demonstrate improved seated posture to improve  10 weeks/ 16 visits  1/24/2022     In progress  4/26/2022     5.Pt will report </= 2/10 current pain and </= 4/10 pain for at worst pain over the past week to improve activity tolerance  10 weeks/ 16 visits  1/24/2022 In progress  4/26/2022       Plan       Updated Certification Period: 4/26/2022 to  06/11/2022    Continue plan of care with focus on decreasing pain and improving Left upper extremity function.    Noah Sanchez, PT

## 2022-04-26 NOTE — PROGRESS NOTES
November 27, 2018     Patient: Lacy Livingston   YOB: 2012   Date of Visit: 11/27/2018       To Whom it May Concern:    Carla Emir is under my professional care  He was seen in my office on 11/27/2018  If you have any questions or concerns, please don't hesitate to call           Sincerely,          Tanvi Myers,         CC: No Recipients Subjective:    Patient ID:  Erica Masterson is a 76 y.o. female who presents for   Hypertension    Legs sting now - gabapentin  Not help    tite in legs gone   She has had thyroid surgery  She was placed on gabapentin for neuropathy in the legs but it really did help a lot  Some of the symptoms may be related to rosuvastatin  Her potassium was low last time but it has recovered and now she is off potassium  Denies any chest pain shortness of breath      Review of patient's allergies indicates:  No Known Allergies    Past Medical History:   Diagnosis Date    Allergy     Anxiety     Cancer     Cataract     Colon polyp     Degenerative arthritis of knee 04/03/2012    Diverticulosis     Encounter for blood transfusion     GERD (gastroesophageal reflux disease)     History of thyroid cancer 2021    Hyperlipidemia     Hypertension     Multinodular goiter 03/26/2012    Myopathy, unspecified 01/18/2010    Tuberculosis      Past Surgical History:   Procedure Laterality Date    BREAST BIOPSY Left 2015    benign    CHOLECYSTECTOMY      COLONOSCOPY  12/2/15    Dr. Britton, multiple polyps, recheck five years-three years if more than 2 polyps are adenomatous    COLONOSCOPY N/A 12/2/2015    COLONOSCOPY N/A 3/20/2019    Procedure: COLONOSCOPY;  Surgeon: Jose Britton MD;  Location[facility];  Service: Endoscopy;  Laterality: N/A;    COLONOSCOPY N/A 5/20/2020    Dr. Britton; internal hemorrhoids; diverticulosis; polyps removed; repeat in 3 years    CYSTOSCOPY N/A 8/26/2020    Procedure: CYSTOSCOPY;  Surgeon: Charlotte Walters Jr., MD;  Location: [facility];  Service: Urology;  Laterality: N/A;    DISSECTION OF NECK Left 9/13/2021    Procedure: DISSECTION, NECK;  Surgeon: Ryan Torres MD;  Location: [address];  Service: ENT;  Laterality: Left;    ESOPHAGOGASTRODUODENOSCOPY N/A 5/20/2020    Dr. Britton; small hiatal hernia; gastritis; 8 gastric polyps removed    ESOPHAGOGASTRODUODENOSCOPY N/A 4/1/2022     Procedure: EGD (ESOPHAGOGASTRODUODENOSCOPY);  Surgeon: Jose Britton MD;  Location: [facility] ENDO;  Service: Endoscopy;  Laterality: N/A;    FOOT SURGERY      HYSTERECTOMY      TVH/BSO > 20 years    INTRALUMINAL GASTROINTESTINAL TRACT IMAGING VIA CAPSULE N/A 6/4/2020    Procedure: IMAGING PROCEDURE, GI TRACT, INTRALUMINAL, VIA CAPSULE;  Surgeon: Jose Britton MD;  Location: [facility] ENDO;  Service: Endoscopy;  Laterality: N/A;    KNEE SURGERY  06/06/2013    right knee tear Dr Iverson     LAPAROSCOPIC CHOLECYSTECTOMY N/A 9/17/2020    Procedure: CHOLECYSTECTOMY, LAPAROSCOPIC;  Surgeon: Forrest Veronica MD;  Location: [facility] OR;  Service: General;  Laterality: N/A;    LUNG LOBECTOMY  1966    right middle lobectomy, due to Tb    OOPHORECTOMY      SHOULDER SURGERY      francis     THYROIDECTOMY Bilateral 5/19/2021    Procedure: THYROIDECTOMY;  Surgeon: Jamia Amezquita MD;  Location: [facility] OR 2ND FLR;  Service: General;  Laterality: Bilateral;    THYROIDECTOMY Bilateral 9/13/2021    Procedure: THYROIDECTOMY WITH INTRA-OP PTH;  Surgeon: Ryan Torres MD;  Location: [facility] OR 2ND FLR;  Service: ENT;  Laterality: Bilateral;  NIM tube  Intraop PTH     Social History     Tobacco Use    Smoking status: Never Smoker    Smokeless tobacco: Never Used   Substance Use Topics    Alcohol use: Not Currently     Comment: stopped 2019    Drug use: No        Review of Systems     ROS        Objective:        Vitals:    04/26/22 1344   BP: 122/70   Pulse: 74       Lab Results   Component Value Date    WBC 4.72 03/27/2022    HGB 12.1 03/27/2022    HCT 37.7 03/27/2022     03/27/2022    CHOL 177 04/06/2022    TRIG 48 04/06/2022    HDL 70 04/06/2022    ALT 19 04/06/2022    AST 20 04/06/2022     04/06/2022    K 4.4 04/06/2022     04/06/2022    CREATININE 1.0 04/06/2022    BUN 19 04/06/2022    CO2 23 04/06/2022    TSH 0.010 (L) 03/21/2022    INR 1.1 03/21/2022    GLUF 104 05/22/2004    HGBA1C 5.8 (H)  04/06/2022        ECHOCARDIOGRAM RESULTS  Results for orders placed in visit on 12/03/19    Echo    Interpretation Summary  · Concentric left ventricular remodeling.  · Increased (hyperdynamic) left ventricular systolic function. The estimated ejection fraction is 70%  · Normal LV diastolic function.  · Normal right ventricular systolic function.  · There is mild leaflet calcification of the Mitral Valve.  · Normal central venous pressure (3 mm Hg).  · The estimated PA systolic pressure is 20 mm Hg  · Since previous echo of 12/22/2017, there is no significant change. Atrial septal aneurysm is still noted.      CURRENT/PREVIOUS VISIT EKG  Results for orders placed or performed during the hospital encounter of 03/27/22   EKG 12-lead    Collection Time: 03/27/22  8:11 AM    Narrative    Test Reason : R10.11,    Vent. Rate : 073 BPM     Atrial Rate : 073 BPM     P-R Int : 170 ms          QRS Dur : 086 ms      QT Int : 410 ms       P-R-T Axes : 054 026 048 degrees     QTc Int : 451 ms    Normal sinus rhythm  Normal ECG  When compared with ECG of 22-MAR-2022 08:36,  No significant change was found  Confirmed by Aldair CADENA, Filippo ALEJANDRO ([id number]) on 3/28/2022 11:53:35 AM    Referred By: [email or web address]   SELF           Confirmed By:Filippo Quinteros MD     Results for orders placed in visit on 12/03/19    Echo    Interpretation Summary  · Concentric left ventricular remodeling.  · Increased (hyperdynamic) left ventricular systolic function. The estimated ejection fraction is 70%  · Normal LV diastolic function.  · Normal right ventricular systolic function.  · There is mild leaflet calcification of the Mitral Valve.  · Normal central venous pressure (3 mm Hg).  · The estimated PA systolic pressure is 20 mm Hg  · Since previous echo of 12/22/2017, there is no significant change. Atrial septal aneurysm is still noted.    Results for orders placed during the hospital encounter of 03/21/22    Nuclear Stress Test    Interpretation  Summary    The nuclear portion of this study will be reported separately.    The EKG portion of this study is negative for ischemia.    The patient reported no chest pain during the stress test.    There were no arrhythmias during stress.      PHYSICAL EXAM    Physical Exam of 130/80  Head neck no bruits  Lungs are clear  Cardiac regular  Abdomen no masses  Extremities no flatus edema    Medication List with Changes/Refills   Current Medications    AMLODIPINE (NORVASC) 2.5 MG TABLET    Take 1 tablet by mouth once daily    BLOOD SUGAR DIAGNOSTIC (BLOOD GLUCOSE TEST) STRP    True Metrix glucose strips check once daily    BLOOD-GLUCOSE METER KIT    True Metrix glucometer check glucose once daily    CALCIUM CARBONATE (TUMS) 200 MG CALCIUM (500 MG) CHEWABLE TABLET    Chew and swallow 2 tablets (1,000 mg total) by mouth 3 (three) times daily. for 14 days    CARBOXYMETHYLCELLULOSE SODIUM (REFRESH OPHT)    Apply 1 drop to eye daily as needed.    CONJUGATED ESTROGENS (PREMARIN) VAGINAL CREAM    Place 0.5 g vaginally once daily AND 0.5 g twice a week.    DOXEPIN (SINEQUAN) 10 MG CAPSULE    Take 10 mg by mouth every evening.    ERGOCALCIFEROL (ERGOCALCIFEROL) 50,000 UNIT CAP    Take 1 capsule (50,000 Units total) by mouth every 30 days.    GABAPENTIN (NEURONTIN) 300 MG CAPSULE    Take 1 capsule (300 mg total) by mouth 3 (three) times daily.    LEVOTHYROXINE (SYNTHROID) 100 MCG TABLET    Take 1 tablet (100 mcg total) by mouth before breakfast.    LINACLOTIDE (LINZESS) 290 MCG CAP CAPSULE    Take 1 capsule (290 mcg total) by mouth before breakfast.    LOSARTAN (COZAAR) 100 MG TABLET    Take 1 tablet (100 mg total) by mouth once daily.    METFORMIN (GLUCOPHAGE-XR) 500 MG ER 24HR TABLET        METHOCARBAMOL (ROBAXIN) 500 MG TAB    Take 500 mg by mouth 4 (four) times daily.    PANTOPRAZOLE (PROTONIX) 40 MG TABLET    Take 1 tablet (40 mg total) by mouth once daily.    POLYETHYLENE GLYCOL (GLYCOLAX) 17 GRAM PWPK    Take 17 g by  mouth once daily.    POTASSIUM CHLORIDE (KLOR-CON) 10 MEQ TBSR    Take 10 mEq by mouth every other day.    ROSUVASTATIN (CRESTOR) 20 MG TABLET    TAKE 1 TABLET BY MOUTH ONCE DAILY IN THE EVENING    SHINGRIX, PF, 50 MCG/0.5 ML INJECTION        SIMETHICONE (MYLICON) 125 MG CHEWABLE TABLET    Take 125 mg by mouth every 6 (six) hours as needed for Flatulence.    SUCRALFATE (CARAFATE) 1 GRAM TABLET    Take 1 tablet (1 g total) by mouth 4 (four) times daily before meals and nightly. for 10 days    TIZANIDINE (ZANAFLEX) 4 MG TABLET    Take 4 mg by mouth every 6 (six) hours as needed.    UNABLE TO FIND    I B guard bid           Assessment:      Tingling fullness legs    decrease gabapentin , decrease rosuvastatin, co q 10   Plan:   Decrease gabapentin and cut rosuvastatin to 10 mg a day    Problem List Items Addressed This Visit    None          No follow-ups on file.

## 2022-04-28 ENCOUNTER — TELEPHONE (OUTPATIENT)
Dept: GASTROENTEROLOGY | Facility: CLINIC | Age: 77
End: 2022-04-28
Payer: MEDICARE

## 2022-04-28 ENCOUNTER — HOSPITAL ENCOUNTER (EMERGENCY)
Facility: HOSPITAL | Age: 77
Discharge: HOME | End: 2022-04-28
Attending: EMERGENCY MEDICINE
Payer: MEDICARE

## 2022-04-28 VITALS
OXYGEN SATURATION: 100 % | WEIGHT: 149 LBS | SYSTOLIC BLOOD PRESSURE: 140 MMHG | TEMPERATURE: 99 F | HEART RATE: 91 BPM | BODY MASS INDEX: 23.95 KG/M2 | DIASTOLIC BLOOD PRESSURE: 61 MMHG | HEIGHT: 66 IN | RESPIRATION RATE: 18 BRPM

## 2022-04-28 DIAGNOSIS — R10.13 EPIGASTRIC ABDOMINAL PAIN: Primary | ICD-10-CM

## 2022-04-28 DIAGNOSIS — K21.9 GASTROESOPHAGEAL REFLUX DISEASE, UNSPECIFIED WHETHER ESOPHAGITIS PRESENT: ICD-10-CM

## 2022-04-28 LAB
ALBUMIN SERPL BCP-MCNC: 3.7 G/DL (ref 3.5–5.2)
ALP SERPL-CCNC: 41 U/L (ref 55–135)
ALT SERPL W/O P-5'-P-CCNC: 17 U/L (ref 10–44)
ANION GAP SERPL CALC-SCNC: 13 MMOL/L (ref 8–16)
AST SERPL-CCNC: 16 U/L (ref 10–40)
BASOPHILS # BLD AUTO: 0.03 K/UL (ref 0–0.2)
BASOPHILS NFR BLD: 0.6 % (ref 0–1.9)
BILIRUB SERPL-MCNC: 0.6 MG/DL (ref 0.1–1)
BUN SERPL-MCNC: 18 MG/DL (ref 8–23)
CALCIUM SERPL-MCNC: 8.1 MG/DL (ref 8.7–10.5)
CHLORIDE SERPL-SCNC: 104 MMOL/L (ref 95–110)
CO2 SERPL-SCNC: 27 MMOL/L (ref 23–29)
CREAT SERPL-MCNC: 1.1 MG/DL (ref 0.5–1.4)
DIFFERENTIAL METHOD: ABNORMAL
EOSINOPHIL # BLD AUTO: 0.1 K/UL (ref 0–0.5)
EOSINOPHIL NFR BLD: 1.5 % (ref 0–8)
ERYTHROCYTE [DISTWIDTH] IN BLOOD BY AUTOMATED COUNT: 12.6 % (ref 11.5–14.5)
EST. GFR  (AFRICAN AMERICAN): 56 ML/MIN/1.73 M^2
EST. GFR  (NON AFRICAN AMERICAN): 48 ML/MIN/1.73 M^2
GLUCOSE SERPL-MCNC: 83 MG/DL (ref 70–110)
HCT VFR BLD AUTO: 35.3 % (ref 37–48.5)
HGB BLD-MCNC: 11.3 G/DL (ref 12–16)
IMM GRANULOCYTES # BLD AUTO: 0.01 K/UL (ref 0–0.04)
IMM GRANULOCYTES NFR BLD AUTO: 0.2 % (ref 0–0.5)
LIPASE SERPL-CCNC: 6 U/L (ref 4–60)
LYMPHOCYTES # BLD AUTO: 1.3 K/UL (ref 1–4.8)
LYMPHOCYTES NFR BLD: 28.2 % (ref 18–48)
MCH RBC QN AUTO: 29.1 PG (ref 27–31)
MCHC RBC AUTO-ENTMCNC: 32 G/DL (ref 32–36)
MCV RBC AUTO: 91 FL (ref 82–98)
MONOCYTES # BLD AUTO: 0.4 K/UL (ref 0.3–1)
MONOCYTES NFR BLD: 7.8 % (ref 4–15)
NEUTROPHILS # BLD AUTO: 2.9 K/UL (ref 1.8–7.7)
NEUTROPHILS NFR BLD: 61.7 % (ref 38–73)
NRBC BLD-RTO: 0 /100 WBC
PLATELET # BLD AUTO: 266 K/UL (ref 150–450)
PMV BLD AUTO: 8.5 FL (ref 9.2–12.9)
POTASSIUM SERPL-SCNC: 4.2 MMOL/L (ref 3.5–5.1)
PROT SERPL-MCNC: 7.2 G/DL (ref 6–8.4)
RBC # BLD AUTO: 3.88 M/UL (ref 4–5.4)
SODIUM SERPL-SCNC: 144 MMOL/L (ref 136–145)
TROPONIN I SERPL DL<=0.01 NG/ML-MCNC: 0.01 NG/ML (ref 0–0.03)
WBC # BLD AUTO: 4.75 K/UL (ref 3.9–12.7)

## 2022-04-28 PROCEDURE — 36415 COLL VENOUS BLD VENIPUNCTURE: CPT | Performed by: PHYSICIAN ASSISTANT

## 2022-04-28 PROCEDURE — 83690 ASSAY OF LIPASE: CPT | Performed by: PHYSICIAN ASSISTANT

## 2022-04-28 PROCEDURE — 93010 EKG 12-LEAD: ICD-10-PCS | Mod: ,,, | Performed by: SPECIALIST

## 2022-04-28 PROCEDURE — 80053 COMPREHEN METABOLIC PANEL: CPT | Performed by: PHYSICIAN ASSISTANT

## 2022-04-28 PROCEDURE — 85025 COMPLETE CBC W/AUTO DIFF WBC: CPT | Performed by: PHYSICIAN ASSISTANT

## 2022-04-28 PROCEDURE — 99284 EMERGENCY DEPT VISIT MOD MDM: CPT | Mod: 25

## 2022-04-28 PROCEDURE — 93010 ELECTROCARDIOGRAM REPORT: CPT | Mod: ,,, | Performed by: SPECIALIST

## 2022-04-28 PROCEDURE — 84484 ASSAY OF TROPONIN QUANT: CPT | Performed by: PHYSICIAN ASSISTANT

## 2022-04-28 PROCEDURE — 93005 ELECTROCARDIOGRAM TRACING: CPT

## 2022-04-28 PROCEDURE — 25000003 PHARM REV CODE 250: Performed by: EMERGENCY MEDICINE

## 2022-04-28 RX ORDER — MAG HYDROX/ALUMINUM HYD/SIMETH 200-200-20
30 SUSPENSION, ORAL (FINAL DOSE FORM) ORAL
Status: COMPLETED | OUTPATIENT
Start: 2022-04-28 | End: 2022-04-28

## 2022-04-28 RX ADMIN — ALUMINUM HYDROXIDE, MAGNESIUM HYDROXIDE, AND SIMETHICONE 30 ML: 200; 200; 20 SUSPENSION ORAL at 07:04

## 2022-04-28 NOTE — TELEPHONE ENCOUNTER
----- Message from Dilma Tilley sent at 4/28/2022  2:52 PM CDT -----  Type: Needs Medical Advice  Who Called:  Pt  Symptoms (please be specific):  Pt is taking meds that were given to her but the food is still coming up and bad nausea. She gets lumps in her throat. Food is getting lodged in her throat. She almost went to the emergency room last night.   Best Call Back Number: [phone]  Additional Information: Please call and advise

## 2022-04-28 NOTE — FIRST PROVIDER EVALUATION
" Emergency Department TeleTriage Encounter Note      CHIEF COMPLAINT    Chief Complaint   Patient presents with    Abdominal Pain     Intermittent abdominal pain "with bubbling" extending up into throat. Worse with lying flat       VITAL SIGNS   Initial Vitals [04/28/22 1727]   BP Pulse Resp Temp SpO2   (!) 140/61 91 18 98.7 °F (37.1 °C) 100 %      MAP       --            ALLERGIES    Review of patient's allergies indicates:  No Known Allergies    PROVIDER TRIAGE NOTE  This is a teletriage evaluation of a 76 y.o. female presenting to the ED complaining of abdominal pain. Patient reports epigastric pain. She reports feeling like her food gets stuck and then comes back up. She has a foul smell coming from her mouth. She denies chest pain or shortness of breath.     Initial orders will be placed and care will be transferred to an alternate provider when patient is roomed for a full evaluation. Any additional orders and the final disposition will be determined by that provider.           ORDERS  Labs Reviewed   CBC W/ AUTO DIFFERENTIAL   COMPREHENSIVE METABOLIC PANEL   LIPASE   URINALYSIS, REFLEX TO URINE CULTURE   TROPONIN I       ED Orders (720h ago, onward)    Start Ordered     Status Ordering Provider    04/28/22 1901 04/28/22 1900  Vital signs  Every 2 hours         Ordered LATRELL, JEFFY G.    04/28/22 1901 04/28/22 1900  Diet NPO  Diet effective now         Ordered LATRELL, JEFFY G.    04/28/22 1901 04/28/22 1900  Insert peripheral IV  Once         Ordered LATRELL, JEFFY G.    04/28/22 1901 04/28/22 1900  CBC W/ AUTO DIFFERENTIAL  STAT         Pending Collection LATRELL, JEFFY G.    04/28/22 1901 04/28/22 1900  Comp. Metabolic Panel  STAT         Pending Collection LATRELL, JEFFY G.    04/28/22 1901 04/28/22 1900  Lipase  STAT         Pending Collection LATRELL, JEFFY G.    04/28/22 1901 04/28/22 1900  Urinalysis, Reflex to Urine Culture Urine, Clean Catch  STAT         Ordered LATRELL, JEFFY G.    04/28/22 1901 04/28/22 1900 "  Troponin I  STAT         Pending Collection LATRELL, JEFFY DENSON    04/28/22 1731 04/28/22 1730  EKG 12-lead  Once         Completed by ANA LAURA DEL VALLE on 4/28/2022 at  5:42 PM HANNY DE LEON            Virtual Visit Note: The provider triage portion of this emergency department evaluation and documentation was performed via [employer], a HIPAA-compliant telemedicine application, in concert with a tele-presenter in the room. A face to face patient evaluation with one of my colleagues will occur once the patient is placed in an emergency department room.      DISCLAIMER: This note was prepared with [employer] voice recognition transcription software. Garbled syntax, mangled pronouns, and other bizarre constructions may be attributed to that software system.

## 2022-04-28 NOTE — TELEPHONE ENCOUNTER
Patient states she is having evry thing feels like getting stuck and coming back up nausea and feels like shes going to vomit evry time she eats. Taking the pantoprazole 40mg qd and the carafate qid, please advise? Patient instructed to go to the ER if she feels like she needs to.

## 2022-04-29 NOTE — ED PROVIDER NOTES
"Encounter Date: 4/28/2022    SCRIBE #1 NOTE: I, Rachel Hernandez, am scribing for, and in the presence of, Antony Esteban MD.       History     Chief Complaint   Patient presents with    Abdominal Pain     Intermittent abdominal pain "with bubbling" extending up into throat. Worse with lying flat     Time seen by provider: 7:48 PM on 04/28/2022    Erica Masterson is a 76 y.o. female who presents to the ED with intermittent abdominal pain that began several weeks ago. Pt's abdominal pain is accompanied by radiation of a burning sensation up into her throat.  She describes it as metallic sensation in her mouth. Pt endorses pain is preventing her from daily activities and she is unable to sleep at night due to the pain. Pt is taking pantoprazole and sucralfate. Pt reports she is doing a swallow study in 1-2 weeks. Pt had a EGD done on 04/01/2022 which showed a hiatal hernia and gastritis. The patient denies N/V/D, blood in stool, fever, congestion, cough, chest pain, LOC, SOB, or any other symptoms at this time. PMHx of GERD, HTN, and diverticulosis. PSHx of colonoscopy, hysterectomy, cholecystectomy, and esophagogastroduodenoscopy.     The history is provided by the patient.     Review of patient's allergies indicates:  No Known Allergies  Past Medical History:   Diagnosis Date    Allergy     Anxiety     Cancer     Cataract     Colon polyp     Degenerative arthritis of knee 04/03/2012    Diverticulosis     Encounter for blood transfusion     GERD (gastroesophageal reflux disease)     History of thyroid cancer 2021    Hyperlipidemia     Hypertension     Multinodular goiter 03/26/2012    Myopathy, unspecified 01/18/2010    Tuberculosis      Past Surgical History:   Procedure Laterality Date    BREAST BIOPSY Left 2015    benign    CHOLECYSTECTOMY      COLONOSCOPY  12/2/15    Dr. Britton, multiple polyps, recheck five years-three years if more than 2 polyps are adenomatous    COLONOSCOPY N/A " 12/2/2015    COLONOSCOPY N/A 3/20/2019    Procedure: COLONOSCOPY;  Surgeon: Jose Britton MD;  Location: [facility];  Service: Endoscopy;  Laterality: N/A;    COLONOSCOPY N/A 5/20/2020    Dr. Britton; internal hemorrhoids; diverticulosis; polyps removed; repeat in 3 years    CYSTOSCOPY N/A 8/26/2020    Procedure: CYSTOSCOPY;  Surgeon: Charlotte Walters Jr., MD;  Location: [facility] OR;  Service: Urology;  Laterality: N/A;    DISSECTION OF NECK Left 9/13/2021    Procedure: DISSECTION, NECK;  Surgeon: Ryan Torres MD;  Location: [facility] OR 2ND FLR;  Service: ENT;  Laterality: Left;    ESOPHAGOGASTRODUODENOSCOPY N/A 5/20/2020    Dr. Britton; small hiatal hernia; gastritis; 8 gastric polyps removed    ESOPHAGOGASTRODUODENOSCOPY N/A 4/1/2022    Procedure: EGD (ESOPHAGOGASTRODUODENOSCOPY);  Surgeon: Jose Britton MD;  Location: [facility];  Service: Endoscopy;  Laterality: N/A;    FOOT SURGERY      HYSTERECTOMY      TVH/BSO > 20 years    INTRALUMINAL GASTROINTESTINAL TRACT IMAGING VIA CAPSULE N/A 6/4/2020    Procedure: IMAGING PROCEDURE, GI TRACT, INTRALUMINAL, VIA CAPSULE;  Surgeon: Jose Britton MD;  Location: [facility];  Service: Endoscopy;  Laterality: N/A;    KNEE SURGERY  06/06/2013    right knee tear Dr Iverson     LAPAROSCOPIC CHOLECYSTECTOMY N/A 9/17/2020    Procedure: CHOLECYSTECTOMY, LAPAROSCOPIC;  Surgeon: Forrest Veronica MD;  Location: [facility] OR;  Service: General;  Laterality: N/A;    LUNG LOBECTOMY  1966    right middle lobectomy, due to Tb    OOPHORECTOMY      SHOULDER SURGERY      francis     THYROIDECTOMY Bilateral 5/19/2021    Procedure: THYROIDECTOMY;  Surgeon: Jamia Amezquita MD;  Location: [facility] OR 2ND FLR;  Service: General;  Laterality: Bilateral;    THYROIDECTOMY Bilateral 9/13/2021    Procedure: THYROIDECTOMY WITH INTRA-OP PTH;  Surgeon: Ryan Torres MD;  Location: [facility] OR 2ND FLR;  Service: ENT;  Laterality: Bilateral;  NIM tube  Intraop PTH     Family History   Problem  Relation Age of Onset    Colon cancer Other     Cancer Mother     Hypertension Mother     Hyperlipidemia Mother     Cancer Brother     Hypertension Father     Emphysema Father     Diabetes Son     Hypertension Son     Alcohol abuse Son     Diabetes Maternal Aunt     Alzheimer's disease Maternal Uncle     Cancer Maternal Grandmother     No Known Problems Daughter     Dementia Sister     Alzheimer's disease Sister     Breast cancer Sister 60    Obesity Paternal Uncle     Prostate cancer Other     Melanoma Neg Hx     Psoriasis Neg Hx     Lupus Neg Hx     Eczema Neg Hx     Amblyopia Neg Hx     Blindness Neg Hx     Cataracts Neg Hx     Glaucoma Neg Hx     Macular degeneration Neg Hx     Retinal detachment Neg Hx     Strabismus Neg Hx     Stroke Neg Hx     Thyroid cancer Neg Hx     Colon polyps Neg Hx     Ulcerative colitis Neg Hx     Stomach cancer Neg Hx     Rectal cancer Neg Hx      Social History     Tobacco Use    Smoking status: Never Smoker    Smokeless tobacco: Never Used   Substance Use Topics    Alcohol use: Not Currently     Comment: stopped 2019    Drug use: No     Review of Systems   Constitutional: Negative for fever.   HENT: Negative for congestion.    Eyes: Negative for visual disturbance.   Respiratory: Negative for cough and shortness of breath.    Cardiovascular: Negative for chest pain.   Gastrointestinal: Positive for abdominal pain. Negative for blood in stool, diarrhea, nausea and vomiting.   Musculoskeletal: Negative for arthralgias.   Skin: Negative for pallor.   Neurological: Negative for syncope.   Hematological: Does not bruise/bleed easily.   Psychiatric/Behavioral: Negative for agitation.       Physical Exam     Initial Vitals [04/28/22 1727]   BP Pulse Resp Temp SpO2   (!) 140/61 91 18 98.7 °F (37.1 °C) 100 %      MAP       --         Physical Exam    Nursing note and vitals reviewed.  Constitutional: She appears well-developed and well-nourished. She  is not diaphoretic. No distress.   HENT:   Head: Normocephalic and atraumatic.   Mouth/Throat: Oropharynx is clear and moist.   Eyes: Conjunctivae are normal.   Neck: Neck supple.   Cardiovascular: Normal rate, regular rhythm, normal heart sounds and intact distal pulses. Exam reveals no gallop and no friction rub.    No murmur heard.  Pulmonary/Chest: Breath sounds normal. She has no wheezes. She has no rhonchi. She has no rales.   Abdominal: Abdomen is soft. She exhibits no distension. There is no hepatomegaly. There is abdominal tenderness (minimal) in the epigastric area. No hernia. There is no rebound and no guarding.   Musculoskeletal:         General: No edema. Normal range of motion.      Cervical back: Neck supple.     Neurological: She is alert and oriented to person, place, and time.   Skin: Capillary refill takes less than 2 seconds. No rash noted. No erythema.   Psychiatric: Her speech is normal.         ED Course   Procedures  Labs Reviewed   CBC W/ AUTO DIFFERENTIAL - Abnormal; Notable for the following components:       Result Value    RBC 3.88 (*)     Hemoglobin 11.3 (*)     Hematocrit 35.3 (*)     MPV 8.5 (*)     All other components within normal limits   COMPREHENSIVE METABOLIC PANEL - Abnormal; Notable for the following components:    Calcium 8.1 (*)     Alkaline Phosphatase 41 (*)     eGFR if  56 (*)     eGFR if non  48 (*)     All other components within normal limits   LIPASE   TROPONIN I          Imaging Results    None          Medications   aluminum-magnesium hydroxide-simethicone 200-200-20 mg/5 mL suspension 30 mL (30 mLs Oral Given 4/28/22 1959)     Medical Decision Making:   History:   Old Medical Records: I decided to obtain old medical records.  Independently Interpreted Test(s):   I have ordered and independently interpreted EKG Reading(s) - see prior notes  Clinical Tests:   Lab Tests: Ordered and Reviewed  Medical Tests: Reviewed and Ordered           Scribe Attestation:   Scribe #1: I performed the above scribed service and the documentation accurately describes the services I performed. I attest to the accuracy of the note.        ED Course as of 04/29/22 0155   Thu Apr 28, 2022 1956 EKG:  Normal sinus rhythm at a rate of 84.  Normal intervals.  Normal axis.  No significant ST or T wave changes suggesting acute ischemia or infarction.  Old T-wave flattening in aVL compared to last prior. [MR]      ED Course User Index  [MR] Antony Esteban MD          I, Dr. Antony Esteban, personally performed the services described in this documentation. All medical record entries made by the scribe were at my direction and in my presence.  I have reviewed the chart and agree that the record reflects my personal performance and is accurate and complete. Antony Esteban MD.  1:54 AM 04/29/2022    Erica Masterson is a 76 y.o. female presenting with with epigastric discomfort with reflux consistent with gastroesophageal reflux and mild esophagitis.  Laboratories and workup reviewed.  I have very low suspicion for ACS.  EKG is nonischemic with negative cardiac biomarker.  Medical therapy with PPI and antacids as necessary discussed pending GI follow-up.  I have very low suspicion for other emergent, life-threatening intra-abdominal process such as perforated viscus, GI bleed, pancreatitis, obstruction.  I do not think further abdominal imaging or surgical consultation is indicated.  Return precautions reviewed.    Clinical Impression:   Final diagnoses:  [R10.13] Epigastric abdominal pain (Primary)  [K21.9] Gastroesophageal reflux disease, unspecified whether esophagitis present          ED Disposition Condition    Discharge Stable        ED Prescriptions     None        Follow-up Information     Follow up With Specialties Details Why Contact Info    Your GI doctor, next week               Antony Esteban MD  04/29/22 0155

## 2022-05-02 ENCOUNTER — TELEPHONE (OUTPATIENT)
Dept: ENDOCRINOLOGY | Facility: CLINIC | Age: 77
End: 2022-05-02
Payer: MEDICARE

## 2022-05-02 NOTE — TELEPHONE ENCOUNTER
Patient had a thyroid ultrasound on 4-6. Asking for results. Do not see any in her chart. I do see where she was checked in for it. Can you advise?

## 2022-05-02 NOTE — TELEPHONE ENCOUNTER
Results released via portal result notes on 4/8/2022. Not read. Okay to let her know:    Ultrasound did not find abnormalities, no leftover thyroid tissue and no concerning lymph nodes, so that's good. Will be on the lookout for the next thyroid blood test.     Let me know if you have any questions or concerns.     Sincerely,     Juan M Landrum MD  Endocrinologist

## 2022-05-02 NOTE — TELEPHONE ENCOUNTER
----- Message from Alvarez Nevarez sent at 5/2/2022 12:48 PM CDT -----  Regarding: Call Back  Who Called: pt          What is the reason for the call: calling for results of US she took about two weeks ago. Please contact to further discuss.          Can patient be contacted on [employer]: no          Call back number: [phone]

## 2022-05-02 NOTE — TELEPHONE ENCOUNTER
Informed pt. of message from Dr. Landrum. Pt verbalized an understanding & will have labs drawn on 5/5/22.

## 2022-05-03 ENCOUNTER — TELEPHONE (OUTPATIENT)
Dept: ORTHOPEDICS | Facility: CLINIC | Age: 77
End: 2022-05-03
Payer: MEDICARE

## 2022-05-03 NOTE — TELEPHONE ENCOUNTER
----- Message from Marleen Zimmerman MA sent at 5/3/2022  8:21 AM CDT -----  Contact: pt  Pt calling wants her appt sooner..  states we cancelled an rescheduled too far out   Call back  [phone]

## 2022-05-04 ENCOUNTER — TELEPHONE (OUTPATIENT)
Dept: FAMILY MEDICINE | Facility: CLINIC | Age: 77
End: 2022-05-04
Payer: MEDICARE

## 2022-05-04 NOTE — TELEPHONE ENCOUNTER
----- Message from Mariza Arana sent at 5/4/2022  1:31 PM CDT -----  Contact: pt  [phone]  Type:  Same Day Appointment Request    Caller is requesting a same day appointment.  Caller declined first available appointment listed below.      Name of Caller:  Pt  When is the first available appointment?  NA  Symptoms:    Best Call Back Number:  [phone]    Additional Information:   Pt was in Er on Thursday for pain in her chest and burning and stinging in her legs. Pt stses she has burning and stinging all over body

## 2022-05-05 ENCOUNTER — LAB VISIT (OUTPATIENT)
Dept: LAB | Facility: HOSPITAL | Age: 77
End: 2022-05-05
Attending: INTERNAL MEDICINE
Payer: MEDICARE

## 2022-05-05 ENCOUNTER — OFFICE VISIT (OUTPATIENT)
Dept: FAMILY MEDICINE | Facility: CLINIC | Age: 77
End: 2022-05-05
Attending: FAMILY MEDICINE
Payer: MEDICARE

## 2022-05-05 VITALS
DIASTOLIC BLOOD PRESSURE: 74 MMHG | WEIGHT: 149.25 LBS | TEMPERATURE: 99 F | OXYGEN SATURATION: 98 % | RESPIRATION RATE: 18 BRPM | HEART RATE: 76 BPM | SYSTOLIC BLOOD PRESSURE: 110 MMHG | HEIGHT: 66 IN | BODY MASS INDEX: 23.99 KG/M2

## 2022-05-05 DIAGNOSIS — E89.0 POSTOPERATIVE HYPOTHYROIDISM: Chronic | ICD-10-CM

## 2022-05-05 DIAGNOSIS — N18.31 STAGE 3A CHRONIC KIDNEY DISEASE: Chronic | ICD-10-CM

## 2022-05-05 DIAGNOSIS — E11.00 TYPE 2 DIABETES MELLITUS WITH HYPEROSMOLARITY WITHOUT COMA, WITHOUT LONG-TERM CURRENT USE OF INSULIN: Chronic | ICD-10-CM

## 2022-05-05 DIAGNOSIS — C73 MALIGNANT NEOPLASM OF THYROID GLAND: ICD-10-CM

## 2022-05-05 DIAGNOSIS — F45.0 ANXIETY WITH SOMATIZATION: Primary | ICD-10-CM

## 2022-05-05 DIAGNOSIS — F41.9 ANXIETY WITH SOMATIZATION: Primary | ICD-10-CM

## 2022-05-05 DIAGNOSIS — C73 THYROID CANCER: Chronic | ICD-10-CM

## 2022-05-05 DIAGNOSIS — E89.0 POST-SURGICAL HYPOTHYROIDISM: ICD-10-CM

## 2022-05-05 LAB
T4 FREE SERPL-MCNC: 1.4 NG/DL (ref 0.71–1.51)
TSH SERPL DL<=0.005 MIU/L-ACNC: 0.01 UIU/ML (ref 0.4–4)

## 2022-05-05 PROCEDURE — 86800 THYROGLOBULIN ANTIBODY: CPT | Performed by: INTERNAL MEDICINE

## 2022-05-05 PROCEDURE — 99499 UNLISTED E&M SERVICE: CPT | Mod: S$GLB,,, | Performed by: FAMILY MEDICINE

## 2022-05-05 PROCEDURE — 99214 OFFICE O/P EST MOD 30 MIN: CPT | Mod: S$GLB,,, | Performed by: FAMILY MEDICINE

## 2022-05-05 PROCEDURE — 3074F SYST BP LT 130 MM HG: CPT | Mod: CPTII,S$GLB,, | Performed by: FAMILY MEDICINE

## 2022-05-05 PROCEDURE — 99999 PR PBB SHADOW E&M-EST. PATIENT-LVL V: ICD-10-PCS | Mod: PBBFAC,,, | Performed by: FAMILY MEDICINE

## 2022-05-05 PROCEDURE — 1160F PR REVIEW ALL MEDS BY PRESCRIBER/CLIN PHARMACIST DOCUMENTED: ICD-10-PCS | Mod: CPTII,S$GLB,, | Performed by: FAMILY MEDICINE

## 2022-05-05 PROCEDURE — 99214 PR OFFICE/OUTPT VISIT, EST, LEVL IV, 30-39 MIN: ICD-10-PCS | Mod: S$GLB,,, | Performed by: FAMILY MEDICINE

## 2022-05-05 PROCEDURE — 1160F RVW MEDS BY RX/DR IN RCRD: CPT | Mod: CPTII,S$GLB,, | Performed by: FAMILY MEDICINE

## 2022-05-05 PROCEDURE — 3288F FALL RISK ASSESSMENT DOCD: CPT | Mod: CPTII,S$GLB,, | Performed by: FAMILY MEDICINE

## 2022-05-05 PROCEDURE — 84443 ASSAY THYROID STIM HORMONE: CPT | Performed by: INTERNAL MEDICINE

## 2022-05-05 PROCEDURE — 36415 COLL VENOUS BLD VENIPUNCTURE: CPT | Mod: PO | Performed by: INTERNAL MEDICINE

## 2022-05-05 PROCEDURE — 3078F DIAST BP <80 MM HG: CPT | Mod: CPTII,S$GLB,, | Performed by: FAMILY MEDICINE

## 2022-05-05 PROCEDURE — 99999 PR PBB SHADOW E&M-EST. PATIENT-LVL V: CPT | Mod: PBBFAC,,, | Performed by: FAMILY MEDICINE

## 2022-05-05 PROCEDURE — 1101F PR PT FALLS ASSESS DOC 0-1 FALLS W/OUT INJ PAST YR: ICD-10-PCS | Mod: CPTII,S$GLB,, | Performed by: FAMILY MEDICINE

## 2022-05-05 PROCEDURE — 3288F PR FALLS RISK ASSESSMENT DOCUMENTED: ICD-10-PCS | Mod: CPTII,S$GLB,, | Performed by: FAMILY MEDICINE

## 2022-05-05 PROCEDURE — 1159F PR MEDICATION LIST DOCUMENTED IN MEDICAL RECORD: ICD-10-PCS | Mod: CPTII,S$GLB,, | Performed by: FAMILY MEDICINE

## 2022-05-05 PROCEDURE — 1101F PT FALLS ASSESS-DOCD LE1/YR: CPT | Mod: CPTII,S$GLB,, | Performed by: FAMILY MEDICINE

## 2022-05-05 PROCEDURE — 3074F PR MOST RECENT SYSTOLIC BLOOD PRESSURE < 130 MM HG: ICD-10-PCS | Mod: CPTII,S$GLB,, | Performed by: FAMILY MEDICINE

## 2022-05-05 PROCEDURE — 1159F MED LIST DOCD IN RCRD: CPT | Mod: CPTII,S$GLB,, | Performed by: FAMILY MEDICINE

## 2022-05-05 PROCEDURE — 3078F PR MOST RECENT DIASTOLIC BLOOD PRESSURE < 80 MM HG: ICD-10-PCS | Mod: CPTII,S$GLB,, | Performed by: FAMILY MEDICINE

## 2022-05-05 PROCEDURE — 84439 ASSAY OF FREE THYROXINE: CPT | Performed by: INTERNAL MEDICINE

## 2022-05-05 PROCEDURE — 99499 RISK ADDL DX/OHS AUDIT: ICD-10-PCS | Mod: S$GLB,,, | Performed by: FAMILY MEDICINE

## 2022-05-05 RX ORDER — DOXEPIN HYDROCHLORIDE 10 MG/1
20 CAPSULE ORAL NIGHTLY
Start: 2022-05-05 | End: 2022-07-06 | Stop reason: DRUGHIGH

## 2022-05-05 NOTE — PROGRESS NOTES
Subjective:       Patient ID: Erica Masterson is a 76 y.o. female.    Chief Complaint: Follow-up (Hospital f/u)    76-year-old female with a history of metastatic thyroid cancer and postoperative hypothyroidism comes in for ER follow-up from a trip to [facility] April 28, 2022 although she has numerous ER visits.  History is quite chaotic with numerous complaints but she continues to have pain and tightness in her throat neck and shoulders.  She recently had another GI evaluation and endoscopy was normal.  She has a modified barium swallow ordered for next week by GI.  She has iatrogenic hyperthyroidism in part due to her need to suppress TSH.  Thyroid levels were done earlier today but the results are still pending.  This is being managed by Dr. Eubanks.  She has been having bubbling in her stomach and she is having burning in tingling in her legs.  Dr. Robles started her on some gabapentin telling her to take one 3 times a day with each meal but she is only taking one twice a day currently.  I had put her on some low-dose doxepin at 10 mg a day taken at bedtime and she has not noted any improvement with this but she has not had any side effect issues with that either.  She is willing to increase it to 20 mg HS.  She expresses that her family is getting tired of dealing with her and her frequent trips to the emergency room during the night.  Most often when she arrives all of her symptoms have disappeared.    Her recent A1c was suppressed at 5.7 and she was taken off of the low-dose metformin 500 mg daily she had been taking in an effort to reduce the possibility of hypoglycemia and possibly improve some of her GI symptoms.  Dr. Robles also reduced her Crestor from 20 mg a day to 10 mg a day although she had not responded to two weeks off the Crestor altogether in the past.    Past Medical History:  No date: Allergy  No date: Anxiety  No date: Cancer  No date: Cataract  No date: Colon polyp  04/03/2012:  Degenerative arthritis of knee  No date: Diverticulosis  No date: Encounter for blood transfusion  No date: GERD (gastroesophageal reflux disease)  2021: History of thyroid cancer  No date: Hyperlipidemia  No date: Hypertension  03/26/2012: Multinodular goiter  01/18/2010: Myopathy, unspecified  No date: Tuberculosis    Past Surgical History:  2015: BREAST BIOPSY; Left      Comment:  benign  No date: CHOLECYSTECTOMY  12/2/15: COLONOSCOPY      Comment:  Dr. Britton, multiple polyps, recheck five years-three                years if more than 2 polyps are adenomatous  12/2/2015: COLONOSCOPY; N/A  3/20/2019: COLONOSCOPY; N/A      Comment:  Procedure: COLONOSCOPY;  Surgeon: Jose Britton MD;                Location: [facility];  Service: Endoscopy;  Laterality:                N/A;  5/20/2020: COLONOSCOPY; N/A      Comment:  Dr. Britton; internal hemorrhoids; diverticulosis;                polyps removed; repeat in 3 years  8/26/2020: CYSTOSCOPY; N/A      Comment:  Procedure: CYSTOSCOPY;  Surgeon: Charlotte Walters Jr., MD;               Location: [facility] OR;  Service: Urology;  Laterality: N/A;  9/13/2021: DISSECTION OF NECK; Left      Comment:  Procedure: DISSECTION, NECK;  Surgeon: Ryan Torres MD;  Location: [facility] OR [address] [place] [address];  Service: ENT;                 Laterality: Left;  5/20/2020: ESOPHAGOGASTRODUODENOSCOPY; N/A      Comment:  Dr. Britton; small hiatal hernia; gastritis; 8 gastric                polyps removed  4/1/2022: ESOPHAGOGASTRODUODENOSCOPY; N/A      Comment:  Procedure: EGD (ESOPHAGOGASTRODUODENOSCOPY);  Surgeon:                Jose Britton MD;  Location: [facility];  Service:                Endoscopy;  Laterality: N/A;  No date: FOOT SURGERY  No date: HYSTERECTOMY      Comment:  TVH/BSO > 20 years  6/4/2020: INTRALUMINAL GASTROINTESTINAL TRACT IMAGING VIA CAPSULE; N/A      Comment:  Procedure: IMAGING PROCEDURE, GI TRACT, INTRALUMINAL,                VIA CAPSULE;  Surgeon: Jose LAWS  MD Tobi;  Location:                [facility] ENDO;  Service: Endoscopy;  Laterality: N/A;  06/06/2013: KNEE SURGERY      Comment:  right knee tear Dr Iverson   9/17/2020: LAPAROSCOPIC CHOLECYSTECTOMY; N/A      Comment:  Procedure: CHOLECYSTECTOMY, LAPAROSCOPIC;  Surgeon:                Forrest Veronica MD;  Location: [facility] OR;  Service:                General;  Laterality: N/A;  1966: LUNG LOBECTOMY      Comment:  right middle lobectomy, due to Tb  No date: OOPHORECTOMY  No date: SHOULDER SURGERY      Comment:  francis   5/19/2021: THYROIDECTOMY; Bilateral      Comment:  Procedure: THYROIDECTOMY;  Surgeon: Jamia Amezquita MD;  [facility];  Service: General;                 Laterality: Bilateral;  9/13/2021: THYROIDECTOMY; Bilateral      Comment:  Procedure: THYROIDECTOMY WITH INTRA-OP PTH;  Surgeon:                Ryan Torres MD;  [facility];  Service:               ENT;  Laterality: Bilateral;  NIM tube  Intraop PTH    Current Outpatient Medications on File Prior to Visit:  amLODIPine (NORVASC) 2.5 MG tablet, Take 1 tablet by mouth once daily, Disp: 90 tablet, Rfl: 0  blood sugar diagnostic (BLOOD GLUCOSE TEST) Strp, True Metrix glucose strips check once daily, Disp: 100 each, Rfl: 3  carboxymethylcellulose sodium (REFRESH OPHT), Apply 1 drop to eye daily as needed., Disp: , Rfl:   conjugated estrogens (PREMARIN) vaginal cream, Place 0.5 g vaginally once daily AND 0.5 g twice a week. (Patient taking differently: Place 0.5 g vaginally once daily AND 0.5 g twice a week. Saturdays and tuesdays), Disp: 30 g, Rfl: 7  ergocalciferol (ERGOCALCIFEROL) 50,000 unit Cap, Take 1 capsule (50,000 Units total) by mouth every 30 days., Disp: 3 capsule, Rfl: 3  gabapentin (NEURONTIN) 300 MG capsule, Take 1 capsule (300 mg total) by mouth 2 (two) times daily., Disp: 90 capsule, Rfl: 0  levothyroxine (SYNTHROID) 100 MCG tablet, Take 1 tablet (100 mcg total) by mouth before breakfast.,  Disp: 30 tablet, Rfl: 11  linaCLOtide (LINZESS) 290 mcg Cap capsule, Take 1 capsule (290 mcg total) by mouth before breakfast., Disp: 30 capsule, Rfl: 0  pantoprazole (PROTONIX) 40 MG tablet, Take 1 tablet (40 mg total) by mouth once daily., Disp: 30 tablet, Rfl: 2  rosuvastatin (CRESTOR) 20 MG tablet, Take 0.5 tablets (10 mg total) by mouth every evening., Disp: 90 tablet, Rfl: 0  sucralfate (CARAFATE) 1 gram tablet, Take 1 tablet (1 g total) by mouth 4 (four) times daily before meals and nightly. for 10 days, Disp: 40 tablet, Rfl: 0  UNABLE TO FIND, I B guard bid, Disp: , Rfl:   (DISCONTINUED) doxepin (SINEQUAN) 10 MG capsule, Take 20 mg by mouth every evening., Disp: , Rfl:   blood-glucose meter kit, True Metrix glucometer check glucose once daily, Disp: 1 each, Rfl: 0  calcium carbonate (TUMS) 200 mg calcium (500 mg) chewable tablet, Chew and swallow 2 tablets (1,000 mg total) by mouth 3 (three) times daily. for 14 days, Disp: 100 tablet, Rfl: 0  losartan (COZAAR) 100 MG tablet, Take 1 tablet (100 mg total) by mouth once daily., Disp: 90 tablet, Rfl: 3  metFORMIN (GLUCOPHAGE-XR) 500 MG ER 24hr tablet, , Disp: , Rfl:   methocarbamoL (ROBAXIN) 500 MG Tab, Take 500 mg by mouth 4 (four) times daily., Disp: , Rfl:   polyethylene glycol (GLYCOLAX) 17 gram PwPk, Take 17 g by mouth once daily. (Patient not taking: Reported on 5/5/2022), Disp: 30 each, Rfl: 0  SHINGRIX, PF, 50 mcg/0.5 mL injection, , Disp: , Rfl:   simethicone (MYLICON) 125 MG chewable tablet, Take 125 mg by mouth every 6 (six) hours as needed for Flatulence., Disp: , Rfl:   tiZANidine (ZANAFLEX) 4 MG tablet, Take 4 mg by mouth every 6 (six) hours as needed., Disp: , Rfl:     Current Facility-Administered Medications on File Prior to Visit:  electrolyte-S (ISOLYTE), , Intravenous, Continuous, Nilay Jeffries MD, Last Rate: 10 mL/hr at 09/17/20 0950, New Bag at 09/17/20 1155            Review of Systems   Gastrointestinal: Positive for abdominal pain.  Negative for abdominal distention, constipation (Linzess seems to have resolved her constipation in less than a week) and diarrhea.   Musculoskeletal: Positive for neck pain and neck stiffness.   Skin: Positive for color change (She has noted darkening of vaginal area skin that may be a response to the suppressed thyroid an elevated TSH).   Neurological: Positive for numbness. Negative for tremors (Burning with paresthesias in the legs) and weakness.   Psychiatric/Behavioral: Positive for dysphoric mood and sleep disturbance. The patient is nervous/anxious.        Objective:      Physical Exam  Vitals and nursing note reviewed.   Constitutional:       General: She is not in acute distress.     Appearance: Normal appearance. She is normal weight. She is not ill-appearing, toxic-appearing or diaphoretic.      Comments: Good blood pressure control  Normal weight with a BMI of 24.1 she is down 1.3 lb from her April 4, 2022 visit   HENT:      Head: Normocephalic and atraumatic.   Neck:      Vascular: No carotid bruit.   Cardiovascular:      Rate and Rhythm: Normal rate and regular rhythm.      Heart sounds: Normal heart sounds.   Pulmonary:      Effort: Pulmonary effort is normal.      Breath sounds: Normal breath sounds.   Abdominal:      General: Abdomen is flat. Bowel sounds are normal. There is no distension.      Palpations: Abdomen is soft. There is no mass.      Tenderness: There is no abdominal tenderness. There is no guarding or rebound.      Hernia: No hernia is present.   Musculoskeletal:      Right shoulder: Normal. Normal range of motion.      Left shoulder: Normal. Normal range of motion.      Cervical back: Normal range of motion. No rigidity (Upper trapezius muscles are tight but still allows mobility) or tenderness.      Comments: Complains of left shoulder pain but has full abduction and flexion both actively and passively with no hesitation or guarding   Lymphadenopathy:      Cervical: No cervical  adenopathy.   Neurological:      Mental Status: She is alert.   Psychiatric:         Attention and Perception: Attention normal.         Mood and Affect: Mood is anxious and depressed.         Speech: Speech normal.         Behavior: Behavior normal. Behavior is cooperative.         Thought Content: Thought content normal.         Assessment:       1. Anxiety with somatization    2. Type 2 diabetes mellitus with hyperosmolarity without coma, without long-term current use of insulin    3. Postoperative hypothyroidism    4. Thyroid cancer    5. Stage 3a chronic kidney disease        Plan:       1. Anxiety with somatization  I suggested a psychiatric consult but she declined    2. Type 2 diabetes mellitus with hyperosmolarity without coma, without long-term current use of insulin  Lab Results   Component Value Date    HGBA1C 5.8 (H) 04/06/2022     Discontinued metformin as above    3. Postoperative hypothyroidism  Lab Results   Component Value Date    TSH 0.010 (L) 03/21/2022     Repeat TSH done today but the results are not yet available, followed by Dr. Eubanks    4. Thyroid cancer  See above    5. Stage 3a chronic kidney disease  BMP  Lab Results   Component Value Date     04/28/2022    K 4.2 04/28/2022     04/28/2022    CO2 27 04/28/2022    BUN 18 04/28/2022    CREATININE 1.1 04/28/2022    CALCIUM 8.1 (L) 04/28/2022    ANIONGAP 13 04/28/2022    ESTGFRAFRICA 56 (A) 04/28/2022    EGFRNONAA 48 (A) 04/28/2022

## 2022-05-06 ENCOUNTER — CLINICAL SUPPORT (OUTPATIENT)
Dept: REHABILITATION | Facility: HOSPITAL | Age: 77
End: 2022-05-06
Attending: ORTHOPAEDIC SURGERY
Payer: MEDICARE

## 2022-05-06 DIAGNOSIS — R29.898 DECREASED STRENGTH OF UPPER EXTREMITY: ICD-10-CM

## 2022-05-06 DIAGNOSIS — M53.82 DECREASED RANGE OF MOTION OF INTERVERTEBRAL DISCS OF CERVICAL SPINE: Primary | ICD-10-CM

## 2022-05-06 PROCEDURE — 97110 THERAPEUTIC EXERCISES: CPT | Mod: PN,CQ

## 2022-05-06 PROCEDURE — 97112 NEUROMUSCULAR REEDUCATION: CPT | Mod: PN,CQ

## 2022-05-06 PROCEDURE — 97140 MANUAL THERAPY 1/> REGIONS: CPT | Mod: PN,CQ

## 2022-05-06 NOTE — PROGRESS NOTES
[facility]  Outpatient Physical Therapy Daily Treatment Note      Name: Erica Masterson  Clinic Number: [id number]  Visit Date: 5/6/2022    Therapy Diagnosis:   Encounter Diagnoses   Name Primary?    Decreased range of motion of intervertebral discs of cervical spine Yes    Decreased strength of upper extremity        Physician: Adriel Iverson,*   Physician Orders: PT Eval and Treat   Medical Diagnosis from Referral: M25.512 (ICD-10-CM) - Left shoulder pain, unspecified chronicity  Evaluation Date: 1/24/2022  Authorization Period Expiration: 2/27/2022  Plan of Care Expiration: 4/25/2022  Progress Note Due: 2/24/2022  Visit # / Visits authorized: 3/11  Total Visits: 18  FOTO: 0/3     Precautions: Standard, Diabetes, cancer and thyroidectomy      Time In: 1215  Time Out: 1255   Total Appointment Time (timed & untimed codes): 40 minutes      Subjective     Pt reports: she is feeling fine today, but was very sore in her Left upper trap after last visit. She describes her symptoms as stiffness    Response to previous treatment: Positive    Functional change: None stated      Pain: 2 /10  Location: left neck  and shoulder      Objective     Objective measures:     None today    Erica received therapeutic exercises to develop strength, endurance, ROM, flexibility, posture and core stabilization for 20 minutes including:    Seated thoracic extension over towel roll x20 with 3s hold   Bilateral row with Green Theratube 3x12   Single arm row from 120 degrees shoulder flexion with 2 green theratube 3x12 each side   Row precor machine 3x12 with no weight       Manual therapy including joint mobilizations to the CT Junction and thoracic spine for 10 minutes:   STM to L UT and SCM    Not performed seconary to time :  CT gapping mobilization grade III   Thoracic Pas grade III       Erica participated in neuromuscular re-education activities to improve: Coordination,  Kinesthetic, Sense, Proprioception and Posture for 10 minutes. The following activities were included:      Standing serratus punch YTB 2 x 10  SL open book each way 10x    Supine chin retraction into towel 15x  Supine chin retraction with tuck and lift 15x  Seated chin retraction 15x       Patient Education and HEP     She was compliant with home exercise program.    Education provided:   - Continue Home Exercise Program     Written Home Exercises Provided: Patient instructed to cont prior HEP.  Exercises were reviewed and Erica was able to demonstrate them prior to the end of the session.  Erica demonstrated good  understanding of the education provided.     See EMR under Patient Instructions for exercises provided prior visit.    Assessment     Patient with reports of no change of symptoms and continued reports of chest, neck, and shoulder tightness. Physical therapist discussed role of therapy to patient and patient verbalized understanding. Continued UT strengthening and periscapular strengthening performed during today's visit. STM to L UT performed to address muscle tone. Pt with good tolerance to therapy session with no adverse effects reported.          Erica Is progressing well towards her goals.   Pt prognosis is Good.     Pt's spiritual, cultural and educational needs considered and pt agreeable to plan of care and goals.    Anticipated barriers to physical therapy: history of Thyroid surgery    Goals:    STG  Weeks/Visits Date Established  Goal Status    1. Pt will increase cervical extension AROM to >/= 45 deg to improve her ability to look up  5 weeks/ 10 visits  1/24/2022    MET 2/25/2022   2. Pt will increase cervical AROM rotation by 10 deg to improve ability to look over shoulder while driving  5 weeks/ 10 visits  1/24/2022    Partially MET  5/6/2022      3.Pt will report improvement in quality of sleep with decreased reports of night pain since IE to improve quality of life 5 weeks/ 10 visits   1/24/2022    MET ( per pt she is able to get longer stretches of sleep than prior to IE) 2/25/2022   4. FOTO goal 5 weeks/ 10 visits  1/24/2022    MET 2/25/2022   5.Pt will demonstrate and verbalize compliance and independence with HEP to improve therapy outcomes  5 weeks/ 10 visits  1/24/2022    In progress  5/6/2022      6. Pt will report </= 5/10 current pain and </= 7/10 pain for at worst pain over the past week to improve activity tolerance  5 weeks/ 10 visits  1/24/2022    MET 2/25/2022      LTG Weeks/Visits Date Established  Goal Status    1.Pt will increase LUE strength to >/= 4/5 to improve ability to lift objects  10 weeks/ 16 visits  1/24/2022    MET 2/25/2022   2. Pt will report decreased tightness of neck in the morning to improve functional mobility  10 weeks/ 16 visits  1/24/2022       In progress  5/6/2022      3.FOTO goal 10 weeks/ 16 visits  1/24/2022    In progress  5/6/2022      4. Pt will demonstrate improved seated posture to improve  10 weeks/ 16 visits  1/24/2022     In progress  5/6/2022     5.Pt will report </= 2/10 current pain and </= 4/10 pain for at worst pain over the past week to improve activity tolerance  10 weeks/ 16 visits  1/24/2022 In progress  5/6/2022       Plan       Updated Certification Period: 5/6/2022 to 06/11/2022    Continue plan of care with focus on decreasing pain and improving Left upper extremity function.    Jimmy Esqueda, MARISOL

## 2022-05-07 LAB
THRYOGLOBULIN INTERPRETATION: ABNORMAL
THYROGLOB AB SERPL-ACNC: <1.8 IU/ML
THYROGLOB SERPL-MCNC: 8 NG/ML

## 2022-05-09 ENCOUNTER — TELEPHONE (OUTPATIENT)
Dept: ENDOCRINOLOGY | Facility: CLINIC | Age: 77
End: 2022-05-09
Payer: MEDICARE

## 2022-05-09 DIAGNOSIS — E89.0 POST-SURGICAL HYPOTHYROIDISM: Primary | ICD-10-CM

## 2022-05-09 DIAGNOSIS — C73 MALIGNANT NEOPLASM OF THYROID GLAND: ICD-10-CM

## 2022-05-10 ENCOUNTER — CLINICAL SUPPORT (OUTPATIENT)
Dept: REHABILITATION | Facility: HOSPITAL | Age: 77
End: 2022-05-10
Attending: ORTHOPAEDIC SURGERY
Payer: MEDICARE

## 2022-05-10 DIAGNOSIS — M53.82 DECREASED RANGE OF MOTION OF INTERVERTEBRAL DISCS OF CERVICAL SPINE: Primary | ICD-10-CM

## 2022-05-10 DIAGNOSIS — R29.898 DECREASED STRENGTH OF UPPER EXTREMITY: ICD-10-CM

## 2022-05-10 PROCEDURE — 97140 MANUAL THERAPY 1/> REGIONS: CPT | Mod: PN

## 2022-05-10 PROCEDURE — 97110 THERAPEUTIC EXERCISES: CPT | Mod: PN

## 2022-05-10 NOTE — PLAN OF CARE
[facility]  Outpatient Physical Therapy Daily Treatment Note/Discharge Summary       Name: Erica Masterson  Clinic Number: [id number]  Visit Date: 5/10/2022    Therapy Diagnosis:   Encounter Diagnoses   Name Primary?    Decreased range of motion of intervertebral discs of cervical spine Yes    Decreased strength of upper extremity        Physician: Adriel Iverson,*   Physician Orders: PT Eval and Treat   Medical Diagnosis from Referral: M25.512 (ICD-10-CM) - Left shoulder pain, unspecified chronicity  Evaluation Date: 1/24/2022  Authorization Period Expiration: 2/27/2022  Plan of Care Expiration: 4/25/2022  Progress Note Due: 2/24/2022  Visit # / Visits authorized: 4/11  Total Visits: 19  FOTO: 2/3     Precautions: Standard, Diabetes, cancer and thyroidectomy      Time In: 1030  Time Out: 1108  Total Appointment Time (timed & untimed codes): 38 minutes      Subjective     Pt reports: she is doing okay today. She would like an updated home exercise program to continue after discharge.     Response to previous treatment: Positive    Functional change: None stated      Pain: 2 /10  Location: left neck  and shoulder      Objective     Objective measures:     Cervical Range of Motion:    Degrees   Flexion 50   Extension 50   Right Rotation 70   Left Rotation 60     Strength:  Shoulder Right Left   Flexion 5/5 5/5   Abduction 5/5 4+/5   ER 5/5 5/5   IR 5/5 5/5   Serratus Anterior 4-/5 4-/5   Middle Trap 3-/5 3-/5   Low Trap 3-/5 3-/5     CMS Impairment/Limitation/Restriction for FOTO Neck Survey  Status Limitation G-Code CMS Severity Modifier  Intake 61% 39%  Predicted 64% 36% Goal Status+ CJ - At least 20 percent but less than 40 percent  5/10/2022 67% 33% Current Status CJ - At least 20 percent but less than 40 percent    Erica received therapeutic exercises to develop strength, endurance, ROM, flexibility, posture and core stabilization for 30 minutes  including:    Assessment as above  Seated thoracic extension over towel roll x20 with 3s hold   Prone mid trap level II 3x10   Prone low trap level II 3x10   Touchdowns level II 3x10       Not today:   Bilateral row with Green Theratube 3x12   Single arm row from 120 degrees shoulder flexion with 2 green theratube 3x12 each side   Row precor machine 3x12 with no weight       Manual therapy including joint mobilizations to the CT Junction and thoracic spine for 8 minutes:     CT gapping mobilization grade III   Thoracic Pas grade III     Not performed seconary to time :    STM to L UT and SCM    Erica participated in neuromuscular re-education activities to improve: Coordination, Kinesthetic, Sense, Proprioception and Posture for 10 minutes. The following activities were included:      Standing serratus punch YTB 2 x 10  SL open book each way 10x    Supine chin retraction into towel 15x  Supine chin retraction with tuck and lift 15x  Seated chin retraction 15x       Patient Education and HEP     She was compliant with home exercise program.    Education provided:   - PT discussed plan of care with patient to continue home exercise program following discharge   - PT discussed prognosis s    Written Home Exercises Provided: Patient instructed to cont prior HEP.  Exercises were reviewed and Erica was able to demonstrate them prior to the end of the session.  Erica demonstrated good  understanding of the education provided.     See EMR under Patient Instructions for exercises provided prior visit.    Assessment   Mrs. Maldonado has been seen for a total of 19 visits since her evaluation on 02/25/2022. She demonstrates improvement in cervical range of motion, posture, and upper extremity strength. She continues to report tightness in her upper traps and Left side of her throat at night, but throughout the day she is fine. I discussed the plan of care with the patient, that we have exhausted treatments for outpatient physical  therapy at this time. She has reached her maximum level of rehab potential and is appropriate for discharge from skilled physical therapy. PT educated patient on importance of continuing to communicate with her MD about symptoms at night as well as symptoms that start in her stomach. PT verbalized understanding.       Erica Is progressing well towards her goals.   Pt prognosis is Good.     Pt's spiritual, cultural and educational needs considered and pt agreeable to plan of care and goals.    Anticipated barriers to physical therapy: history of Thyroid surgery    Goals:    STG  Weeks/Visits Date Established  Goal Status    1. Pt will increase cervical extension AROM to >/= 45 deg to improve her ability to look up  5 weeks/ 10 visits  1/24/2022    MET 2/25/2022   2. Pt will increase cervical AROM rotation by 10 deg to improve ability to look over shoulder while driving  5 weeks/ 10 visits  1/24/2022     MET  5/10/2022      3.Pt will report improvement in quality of sleep with decreased reports of night pain since IE to improve quality of life 5 weeks/ 10 visits  1/24/2022    MET ( per pt she is able to get longer stretches of sleep than prior to IE) 2/25/2022   4. FOTO goal 5 weeks/ 10 visits  1/24/2022    MET 2/25/2022   5.Pt will demonstrate and verbalize compliance and independence with HEP to improve therapy outcomes  5 weeks/ 10 visits  1/24/2022    In progress  5/10/2022      6. Pt will report </= 5/10 current pain and </= 7/10 pain for at worst pain over the past week to improve activity tolerance  5 weeks/ 10 visits  1/24/2022    MET 2/25/2022      LTG Weeks/Visits Date Established  Goal Status    1.Pt will increase LUE strength to >/= 4/5 to improve ability to lift objects  10 weeks/ 16 visits  1/24/2022    MET 2/25/2022   2. Pt will report decreased tightness of neck in the morning to improve functional mobility  10 weeks/ 16 visits  1/24/2022       MET  5/10/2022      3.FOTO goal 10 weeks/ 16 visits   1/24/2022    In progress  5/10/2022      4. Pt will demonstrate improved seated posture to improve  10 weeks/ 16 visits  1/24/2022    MET  5/10/2022     5.Pt will report </= 2/10 current pain and </= 4/10 pain for at worst pain over the past week to improve activity tolerance  10 weeks/ 16 visits  1/24/2022 Not MET  5/10/2022       Plan     Discharge Summary     Date of Last visit: 05/10/2022  Total Visits Received: 19    Assessment    Goals: MET    Discharge reason: Patient has reached the maximum rehab potential for the present time    Plan   This patient is discharged from Physical Therapy and is to cont with their HEP and MD follow up PRN.        Noah Sanchez, PT

## 2022-05-10 NOTE — PROGRESS NOTES
[facility]  Outpatient Physical Therapy Daily Treatment Note/Discharge Summary       Name: Erica Masterson  Clinic Number: [id number]  Visit Date: 5/10/2022    Therapy Diagnosis:   Encounter Diagnoses   Name Primary?    Decreased range of motion of intervertebral discs of cervical spine Yes    Decreased strength of upper extremity        Physician: Adriel Iverson,*   Physician Orders: PT Eval and Treat   Medical Diagnosis from Referral: M25.512 (ICD-10-CM) - Left shoulder pain, unspecified chronicity  Evaluation Date: 1/24/2022  Authorization Period Expiration: 2/27/2022  Plan of Care Expiration: 4/25/2022  Progress Note Due: 2/24/2022  Visit # / Visits authorized: 4/11  Total Visits: 19  FOTO: 2/3     Precautions: Standard, Diabetes, cancer and thyroidectomy      Time In: 1030  Time Out: 1108  Total Appointment Time (timed & untimed codes): 38 minutes      Subjective     Pt reports: she is doing okay today. She would like an updated home exercise program to continue after discharge.     Response to previous treatment: Positive    Functional change: None stated      Pain: 2 /10  Location: left neck  and shoulder      Objective     Objective measures:     Cervical Range of Motion:    Degrees   Flexion 50   Extension 50   Right Rotation 70   Left Rotation 60     Strength:  Shoulder Right Left   Flexion 5/5 5/5   Abduction 5/5 4+/5   ER 5/5 5/5   IR 5/5 5/5   Serratus Anterior 4-/5 4-/5   Middle Trap 3-/5 3-/5   Low Trap 3-/5 3-/5     CMS Impairment/Limitation/Restriction for FOTO Neck Survey  Status Limitation G-Code CMS Severity Modifier  Intake 61% 39%  Predicted 64% 36% Goal Status+ CJ - At least 20 percent but less than 40 percent  5/10/2022 67% 33% Current Status CJ - At least 20 percent but less than 40 percent    Erica received therapeutic exercises to develop strength, endurance, ROM, flexibility, posture and core stabilization for 30 minutes  including:    Assessment as above  Seated thoracic extension over towel roll x20 with 3s hold   Prone mid trap level II 3x10   Prone low trap level II 3x10   Touchdowns level II 3x10       Not today:   Bilateral row with Green Theratube 3x12   Single arm row from 120 degrees shoulder flexion with 2 green theratube 3x12 each side   Row precor machine 3x12 with no weight       Manual therapy including joint mobilizations to the CT Junction and thoracic spine for 8 minutes:     CT gapping mobilization grade III   Thoracic Pas grade III     Not performed seconary to time :    STM to L UT and SCM    Erica participated in neuromuscular re-education activities to improve: Coordination, Kinesthetic, Sense, Proprioception and Posture for 10 minutes. The following activities were included:      Standing serratus punch YTB 2 x 10  SL open book each way 10x    Supine chin retraction into towel 15x  Supine chin retraction with tuck and lift 15x  Seated chin retraction 15x       Patient Education and HEP     She was compliant with home exercise program.    Education provided:   - PT discussed plan of care with patient to continue home exercise program following discharge   - PT discussed prognosis s    Written Home Exercises Provided: Patient instructed to cont prior HEP.  Exercises were reviewed and Erica was able to demonstrate them prior to the end of the session.  Erica demonstrated good  understanding of the education provided.     See EMR under Patient Instructions for exercises provided prior visit.    Assessment   Mrs. Maldonado has been seen for a total of 19 visits since her evaluation on 02/25/2022. She demonstrates improvement in cervical range of motion, posture, and upper extremity strength. She continues to report tightness in her upper traps and Left side of her throat at night, but throughout the day she is fine. I discussed the plan of care with the patient, that we have exhausted treatments for outpatient physical  therapy at this time. She has reached her maximum level of rehab potential and is appropriate for discharge from skilled physical therapy. PT educated patient on importance of continuing to communicate with her MD about symptoms at night as well as symptoms that start in her stomach. PT verbalized understanding.       Erica Is progressing well towards her goals.   Pt prognosis is Good.     Pt's spiritual, cultural and educational needs considered and pt agreeable to plan of care and goals.    Anticipated barriers to physical therapy: history of Thyroid surgery    Goals:    STG  Weeks/Visits Date Established  Goal Status    1. Pt will increase cervical extension AROM to >/= 45 deg to improve her ability to look up  5 weeks/ 10 visits  1/24/2022    MET 2/25/2022   2. Pt will increase cervical AROM rotation by 10 deg to improve ability to look over shoulder while driving  5 weeks/ 10 visits  1/24/2022     MET  5/10/2022      3.Pt will report improvement in quality of sleep with decreased reports of night pain since IE to improve quality of life 5 weeks/ 10 visits  1/24/2022    MET ( per pt she is able to get longer stretches of sleep than prior to IE) 2/25/2022   4. FOTO goal 5 weeks/ 10 visits  1/24/2022    MET 2/25/2022   5.Pt will demonstrate and verbalize compliance and independence with HEP to improve therapy outcomes  5 weeks/ 10 visits  1/24/2022    In progress  5/10/2022      6. Pt will report </= 5/10 current pain and </= 7/10 pain for at worst pain over the past week to improve activity tolerance  5 weeks/ 10 visits  1/24/2022    MET 2/25/2022      LTG Weeks/Visits Date Established  Goal Status    1.Pt will increase LUE strength to >/= 4/5 to improve ability to lift objects  10 weeks/ 16 visits  1/24/2022    MET 2/25/2022   2. Pt will report decreased tightness of neck in the morning to improve functional mobility  10 weeks/ 16 visits  1/24/2022       MET  5/10/2022      3.FOTO goal 10 weeks/ 16 visits   1/24/2022    In progress  5/10/2022      4. Pt will demonstrate improved seated posture to improve  10 weeks/ 16 visits  1/24/2022    MET  5/10/2022     5.Pt will report </= 2/10 current pain and </= 4/10 pain for at worst pain over the past week to improve activity tolerance  10 weeks/ 16 visits  1/24/2022 Not MET  5/10/2022       Plan     Discharge Summary     Date of Last visit: 05/10/2022  Total Visits Received: 19    Assessment    Goals: MET    Discharge reason: Patient has reached the maximum rehab potential for the present time    Plan   This patient is discharged from Physical Therapy and is to cont with their HEP and MD follow up PRN.        Noah Sanchez, PT

## 2022-05-12 ENCOUNTER — HOSPITAL ENCOUNTER (OUTPATIENT)
Dept: RADIOLOGY | Facility: HOSPITAL | Age: 77
Discharge: HOME | End: 2022-05-12
Payer: MEDICARE

## 2022-05-12 ENCOUNTER — CLINICAL SUPPORT (OUTPATIENT)
Dept: REHABILITATION | Facility: HOSPITAL | Age: 77
End: 2022-05-12
Payer: MEDICARE

## 2022-05-12 DIAGNOSIS — R19.8 RASPBERRY TONGUE: ICD-10-CM

## 2022-05-12 DIAGNOSIS — R13.10 PROBLEMS WITH SWALLOWING AND MASTICATION: ICD-10-CM

## 2022-05-12 DIAGNOSIS — K21.9 ESOPHAGEAL REFLUX: ICD-10-CM

## 2022-05-12 DIAGNOSIS — K21.9 GASTROESOPHAGEAL REFLUX DISEASE, UNSPECIFIED WHETHER ESOPHAGITIS PRESENT: ICD-10-CM

## 2022-05-12 DIAGNOSIS — R09.A2 GLOBUS SENSATION: ICD-10-CM

## 2022-05-12 DIAGNOSIS — R13.10 DYSPHAGIA, UNSPECIFIED TYPE: ICD-10-CM

## 2022-05-12 DIAGNOSIS — R11.0 NAUSEA: ICD-10-CM

## 2022-05-12 PROCEDURE — 92611 MOTION FLUOROSCOPY/SWALLOW: CPT | Mod: KX,PN

## 2022-05-12 PROCEDURE — 74230 X-RAY XM SWLNG FUNCJ C+: CPT | Mod: 26,,, | Performed by: RADIOLOGY

## 2022-05-12 PROCEDURE — 92610 EVALUATE SWALLOWING FUNCTION: CPT | Mod: 59,KX,PN

## 2022-05-12 PROCEDURE — 74230 X-RAY XM SWLNG FUNCJ C+: CPT | Mod: TC

## 2022-05-12 PROCEDURE — 74230 FL MODIFIED BARIUM SWALLOW SPEECH STUDY: ICD-10-PCS | Mod: 26,,, | Performed by: RADIOLOGY

## 2022-05-12 NOTE — PLAN OF CARE
[facility]  MODIFIED BARIUM SWALLOW STUDY      Date: 5/12/2022     Name: Erica Masterson   MRN: [id number]    Therapy Diagnosis: WFL oral and pharyngeal phases of the swallow    Physician: Deja Mendoza NP  Physician Orders: SLP Video Swallow  Medical Diagnosis from Referral:   K21.9 (ICD-10-CM) - Esophageal reflux   R13.10 (ICD-10-CM) - Problems with swallowing and mastication   R19.8 (ICD-10-CM) - Raspberry tongue   R11.0 (ICD-10-CM) - Nausea       Date of Evaluation:  5/12/2022    Time In:  9:00 AM  Time Out:  9:30 AM   Total Billable Time: 30 minutes      Procedure Min.   Swallow and Oral Function Evaluation   20   Fl Modified Barium Swallow Speech  10     Precautions: Standard    Subjective   Date of Onset: Within the past year    Past Medical History: Erica Masterson  has a past medical history of Allergy, Anxiety, Cancer, Cataract, Colon polyp, Degenerative arthritis of knee (04/03/2012), Diverticulosis, Encounter for blood transfusion, GERD (gastroesophageal reflux disease), History of thyroid cancer (2021), Hyperlipidemia, Hypertension, Multinodular goiter (03/26/2012), Myopathy, unspecified (01/18/2010), and Tuberculosis.  Erica Masterson  has a past surgical history that includes Foot surgery; Shoulder surgery; Lung lobectomy (1966); Knee surgery (06/06/2013); Oophorectomy; Esophagogastroduodenoscopy (N/A, 5/20/2020); Breast biopsy (Left, 2015); Colonoscopy (12/2/15); Colonoscopy (N/A, 12/2/2015); Colonoscopy (N/A, 3/20/2019); Colonoscopy (N/A, 5/20/2020); Intraluminal gastrointestinal tract imaging via capsule (N/A, 6/4/2020); Cystoscopy (N/A, 8/26/2020); Laparoscopic cholecystectomy (N/A, 9/17/2020); Thyroidectomy (Bilateral, 5/19/2021); Thyroidectomy (Bilateral, 9/13/2021); Dissection of neck (Left, 9/13/2021); Hysterectomy; Cholecystectomy; and Esophagogastroduodenoscopy (N/A, 4/1/2022).    The patient is a 76 y.o. female who complains of the following symptoms  "30-60 minutes after eating: symptoms of shoulder/neck tightness on left side, nausea, and "food coming back up". Patient endorses that these symptoms do not occur while eating but are always delayed by about 30-60 minutes post meals. Patient states that she always avoids laying down after eating for about 1.5-2 hours however reports that reflux symptoms also begin when laying down (despite waiting ample time).     The patient gave the following history:   -Current diet at home: The International Dysphagia Diet Standardisation Initiative level 0 (thin) liquids; The International Dysphagia Diet Standardisation Initiative level 7 (regular) solids  -Recommended diet from previous study: No history of previous swallow study.  -Therapy received: None known.   -Neurological: Pt denied any neurological diagnoses.  -Gastroenterologist (GI) : Pt endorsed GI diagnosis of GERD and hiatial hernia. Patient endorsed that she manages reflux with medication daily. Pt denied all other GI diagnoses.   -Pulmonary: Pt denied any pulmonary diagnoses.   -Surgery: history of thyroid surgery reported; endorses that the middle lobe of her right lung was removed.  -Cancer: history of thyroid cancer reported    The following observations were made:   -Mental status: Alert and Cooperative  -Factors affecting performance: no difficulties participating in the study  -Feeding Method: independent in self-feeding    Respiratory Status:   -Respiratory Status: room air    Medical Hx and Allergies:  Review of patient's allergies indicates:  No Known Allergies    Pain Scale:  0/10 on VAS currently.   Pain Location: n / a    Objective     Modified Barium Swallow Study  A modified barium swallow study was ordered to objectively evaluate the safety and efficiency of the patients swallowing and to rule out aspiration.      The patient was seen in radiology seated in High Burns's position in a video imaging chair for lateral views of the larynx and an A/P " view. The study was conducted using Varibar thin liquid (IDDSI 0), Varibar nectar liquid (IDDSI 2), Varibar pudding (IDDSI 4), Peaches covered in Varibar powder (IDDSI 6/2) and solid coated in Varibar pudding (IDDSI 7). She tolerated the procedure well.     A cranial nerve examination revealed the following:  Cranial Nerve Examination  Cranial Nerve 5: Trigeminal Nerve  Motor Jaw Posture at rest: Closed  Mandible Elevation/Depression: WFL  Mandible lateralization: WFL  Abnormal movement: absent Interpretation: intact bilaterally   Sensory Forehead: patient reports numbness/tingling on left side  Cheek: patient reports numbness/tingling on left side  Jaw: patient reports numbness/tingling on left side  Facial Pain: None noted Interpretation: unable to rule out cranial nerve involvement      Cranial Nerve 7: Facial Nerve  Motor Facial Symmetry: WNL  Wrinkle Forehead: WFL  Close eyes tightly: unable to complete despite cues and model  Labial Protrusion: WFL  Labial Retraction: WFL  Abnormal movement: absent Interpretation: intact bilaterally    Sensory Formal testing not completed. Patient denied any changes in taste      Cranial Nerves IX and X: Glossopharyngeal and Vagus Nerves  Motor Palatal Symmetry (Rest): WNL  Palatal Symmetry (Movement): WNL  Cough: Perceptually strong  Voice Prior to PO intake: Clear  Resonance: Normal  Abnormal movement: absent Interpretation: intact bilaterally      Cranial Nerve XII: Hypoglossal Nerve  Motor Tongue at rest: WNL  Lingual Protrusion: WNL  Lingual Protrusion against Resistance: WNL  Lingual Lateralization: WNL  Abnormal movement: absent Interpretation: intact bilaterally      Other information:   Volitional Swallow: Able to palpate laryngeal rise   Mucosal Quality: No abnormal findings   Secretion Management: adequate    Dentition: Good condition for speech and mastication       CONSISTENCIES ADMINISTERED:  Thin Liquids (IDDSI 0):   Mode and volume administered: 5ml x2, 10  ml x2, self-regulated cup sip x2, self-regulated straw sip x1, rapid consecutive cup sip x1, rapid consecutive straw sip x1   Oral Residue: none to trace    Vallecular Residue: none to trace    Pyriform Sinus Residue: none    Independent cleansing swallow cleared residue    Rosenbeck's 8-Point Penetration-Aspiration Scale: (1) Material does not enter the airway and (2) Material enters the airway, remains above the vocal folds, and is ejected from the airway  o Best: (1) Material does not enter the airway  o Worst: (2) Material enters the airway, remains above the vocal folds, and is ejected from the airway  - penetration occurred during the swallow with thin liquids via self regulated straw sip    Nectar Thick Liquids (IDDSI 2):   Mode and volume administered: 5ml x2, 10 ml x2   Oral Residue: none to trace   Vallecular Residue: none to trace   Pyriform Sinus Residue: none    Independent cleansing swallow cleared residue    Rosenbeck's 8-Point Penetration-Aspiration Scale: (1) Material does not enter the airway and (2) Material enters the airway, remains above the vocal folds, and is ejected from the airway  o Best: (1) Material does not enter the airway  o Worst: (2) Material enters the airway, remains above the vocal folds, and is ejected from the airway  - penetration occurred during the swallow with nectar thick liquids via 10ml nectar thick liquids    Strategies attempted: none needed    Puree (IDDSI 4):   Mode and volume administered: 5ml x2   Oral Residue: mild    Vallecular Residue: trace   Pyriform Sinus Residue: trace    Independent cleansing swallows cleared residue    Rosenbeck's 8-Point Penetration-Aspiration Scale: (1) Material does not enter the airway   Strategies attempted: none needed    Mixed Consistency Bolus (IDDSI 6 with IDDSI 2):   Mode and volume administered: 2 peaches in juice x2   Oral Residue: trace    Vallecular Residue: trace    Pyriform Sinus Residue: trace     Residue independently cleared with cleansing swallows    Rosenbeck's 8-Point Penetration-Aspiration Scale: (1) Material does not enter the airway   Strategies attempted: warm thin liquid wash attempted due to delayed esophageal emptying     Regular (IDDSI 7):   Mode and volume administered: 1/3 Radha dodawna cookie in barium pudding x2   Oral Residue: none to trace   Vallecular Residue: none   Pyriform Sinus Residue: none    Rosenbeck's 8-Point Penetration-Aspiration Scale: (1) Material does not enter the airway   Strategies attempted: effortful swallow, head turn to the left and warm thin liquids wash attempted due to delayed esophageal emptying   o effortful swallow strategy and head turn to the left strategy was not effective    Treatment   Treatment Time In: n/a  Treatment Time Out: n/a  Total Treatment Time: n/a  Patient educated regarding results and recommendations of the evaluation. See the recommendations section below.    Education: SLP role in care, Plan of care, Results of the study and Recommendations was discussed with the patient. Patient expressed understanding.     Assessment     Erica Masterson is a 76 y.o. female referred for Modified Barium Swallow Study with a medical diagnosis of Esophageal reflux, Problems with swallowing and mastication, Raspberry tongue, Nausea .     Oral Phase: Lip closure was complete with no labial escape.  Bolus preparation and mastication was prolonged with complete recollection. Lingual motion was brisk for adequate bolus transport. There was no significant oral residue.. The swallow was initiated when the head of the bolus entered the vallecula and intermittently when the head of the bolus entered the pyriform sinuses.     Pharyngeal Phase:  The soft palate elevated for complete closure of the velopharyngeal port. Tongue base retraction was complete.  Epiglottic inversion appeared to be complete. Anterior hyoid excursion was complete. Laryngeal elevation was  complete. There was transient penetration noted on straw sips thin liquids and 10ml nectar thick liquids, .  There was no aspiration observed in this study.  Pharyngeal stripping wave appeared present and complete. The pharyngoesophageal segment opening was observed to close prior to swallow was completed.  There was no significant pharyngeal residue. Presence of penetration does not explain patient's report's of nausea, neck pain on left side, or regurgitation of bolus 30-60 minutes after eating.     Esophageal Phase: On esophageal screen, delayed esophageal emptying was noted.    Dysphagia Outcome and Severity Scale (LEANDER): Level 6: WFL/ Modified [place]    Impressions: WFL oral and pharyngeal phases of the swallow. Both swallow safety and swallow efficiency are preserved,. Patient appears to be at low risk for aspiration related pneumonia in consideration of three pillars of aspiration pneumonia* including good oral health status, overall health/immune status, and laryngeal vestibule closure/lack of aspiration viewed on study. Behavioral swallow rehabilitation is not warranted at this time.     *PJ Olsen (2005, March). Pneumonia: Factors Beyond Aspiration. Perspectives in Swallowing and Swallowing Disorders (Dysphagia), 14, 10-16.    Recommendations:      Consistency Recommendations: Thin liquids (IDDSI 0) and regular consistencies (IDDSI 7).    Risk Management: use good oral hygiene , sit upright for all PO intake, increase physical mobility as tolerated, behavioral reflux precautions, alternate bites and sips, multiple swallows per bolus and remain upright for at least 2 hours following any PO intake   Specialist Referrals: GI   Ancillary Tests: Consider Barium Esophagram 2/2 delayed esophageal emptying observed .   Therapy: Dysphagia therapy is not recommended at this time.   Follow-up exam: Follow up swallow study is not indicated at this time.    Please contact [facility] at [phone] if there are questions re: the above or if we can be of additional service to this patient.    Therapist's Name:   MEDARDO Schaefer, CCC-SLP  Speech Language Pathologist     Date: 5/12/2022

## 2022-05-12 NOTE — PROGRESS NOTES
Please see results of modified barium swallow study in plan of care.    MEDARDO Schaefer, CCC-SLP  Speech Language Pathologist   5/12/2022

## 2022-05-12 NOTE — PATIENT INSTRUCTIONS
Aspiration Precautions:    Tips for safe eating and drinking:   Clean your mouth before and after meals.  Make sure your mouth is clean and moist before starting your meal. When you are finished, clean your mouth again. Make sure there is no food around the teeth or stuck in your cheeks.   Be alert. Eat, drink, and take your medications only when you are alert and paying attention.   Reduce distractions. Turn off the TV and reduce distractions while you are eating and drinking.  Sit upright. Sit fully upright for your meals and to take your medications. It's best to eat and drink while sitting in a chair, unless your healthcare provider gave you other positioning instructions.   Stay upright. Stay fully upright for at least 30 minutes after a meal. You may also need to avoid eating 1-2 hours before bed.   Stay active.  Exercise as directed by your healthcare provider. Staying active helps your lungs stay clear.  Keep your lungs clear. Ask your doctor to recommend other therapy or devices to help you with the strength of your cough and the overall clearance of your lungs.     Copyright [employer] 2017

## 2022-05-16 ENCOUNTER — TELEPHONE (OUTPATIENT)
Dept: GASTROENTEROLOGY | Facility: CLINIC | Age: 77
End: 2022-05-16
Payer: MEDICARE

## 2022-05-16 NOTE — TELEPHONE ENCOUNTER
----- Message from Deja Mendoza NP sent at 5/15/2022 11:13 AM CDT -----  Please call to inform & review the results with the patient - let the patient know per radiology report her modified barium swallow showed mild dysphagia (difficultly swallowing). I am recommending an esophagram to further investigate. Use good oral hygiene, sit upright for all PO intake, increase physical mobility as tolerated, behavioral reflux precautions, alternate bites and sips, multiple swallows per bolus and remain upright for at least 2 hours following any PO intake. If symptoms worsen, call/follow-up at clinic or go to ER. Please call with any questions/concerns.  Thank you,  TESSY Mccann, FNP-C

## 2022-05-17 ENCOUNTER — OFFICE VISIT (OUTPATIENT)
Dept: OBSTETRICS AND GYNECOLOGY | Facility: CLINIC | Age: 77
End: 2022-05-17
Payer: MEDICARE

## 2022-05-17 VITALS
BODY MASS INDEX: 24.25 KG/M2 | HEIGHT: 66 IN | WEIGHT: 150.88 LBS | HEART RATE: 72 BPM | OXYGEN SATURATION: 99 % | RESPIRATION RATE: 19 BRPM | SYSTOLIC BLOOD PRESSURE: 140 MMHG | DIASTOLIC BLOOD PRESSURE: 64 MMHG

## 2022-05-17 DIAGNOSIS — N95.2 ATROPHIC VAGINITIS: Primary | ICD-10-CM

## 2022-05-17 PROCEDURE — 3288F PR FALLS RISK ASSESSMENT DOCUMENTED: ICD-10-PCS | Mod: CPTII,S$GLB,, | Performed by: OBSTETRICS & GYNECOLOGY

## 2022-05-17 PROCEDURE — 3078F DIAST BP <80 MM HG: CPT | Mod: CPTII,S$GLB,, | Performed by: OBSTETRICS & GYNECOLOGY

## 2022-05-17 PROCEDURE — 1101F PR PT FALLS ASSESS DOC 0-1 FALLS W/OUT INJ PAST YR: ICD-10-PCS | Mod: CPTII,S$GLB,, | Performed by: OBSTETRICS & GYNECOLOGY

## 2022-05-17 PROCEDURE — 1159F MED LIST DOCD IN RCRD: CPT | Mod: CPTII,S$GLB,, | Performed by: OBSTETRICS & GYNECOLOGY

## 2022-05-17 PROCEDURE — 99999 PR PBB SHADOW E&M-EST. PATIENT-LVL IV: ICD-10-PCS | Mod: PBBFAC,,, | Performed by: OBSTETRICS & GYNECOLOGY

## 2022-05-17 PROCEDURE — 99213 OFFICE O/P EST LOW 20 MIN: CPT | Mod: S$GLB,,, | Performed by: OBSTETRICS & GYNECOLOGY

## 2022-05-17 PROCEDURE — 1159F PR MEDICATION LIST DOCUMENTED IN MEDICAL RECORD: ICD-10-PCS | Mod: CPTII,S$GLB,, | Performed by: OBSTETRICS & GYNECOLOGY

## 2022-05-17 PROCEDURE — 3288F FALL RISK ASSESSMENT DOCD: CPT | Mod: CPTII,S$GLB,, | Performed by: OBSTETRICS & GYNECOLOGY

## 2022-05-17 PROCEDURE — 3078F PR MOST RECENT DIASTOLIC BLOOD PRESSURE < 80 MM HG: ICD-10-PCS | Mod: CPTII,S$GLB,, | Performed by: OBSTETRICS & GYNECOLOGY

## 2022-05-17 PROCEDURE — 3077F SYST BP >= 140 MM HG: CPT | Mod: CPTII,S$GLB,, | Performed by: OBSTETRICS & GYNECOLOGY

## 2022-05-17 PROCEDURE — 1126F PR PAIN SEVERITY QUANTIFIED, NO PAIN PRESENT: ICD-10-PCS | Mod: CPTII,S$GLB,, | Performed by: OBSTETRICS & GYNECOLOGY

## 2022-05-17 PROCEDURE — 99213 PR OFFICE/OUTPT VISIT, EST, LEVL III, 20-29 MIN: ICD-10-PCS | Mod: S$GLB,,, | Performed by: OBSTETRICS & GYNECOLOGY

## 2022-05-17 PROCEDURE — 3077F PR MOST RECENT SYSTOLIC BLOOD PRESSURE >= 140 MM HG: ICD-10-PCS | Mod: CPTII,S$GLB,, | Performed by: OBSTETRICS & GYNECOLOGY

## 2022-05-17 PROCEDURE — 1101F PT FALLS ASSESS-DOCD LE1/YR: CPT | Mod: CPTII,S$GLB,, | Performed by: OBSTETRICS & GYNECOLOGY

## 2022-05-17 PROCEDURE — 1126F AMNT PAIN NOTED NONE PRSNT: CPT | Mod: CPTII,S$GLB,, | Performed by: OBSTETRICS & GYNECOLOGY

## 2022-05-17 PROCEDURE — 99999 PR PBB SHADOW E&M-EST. PATIENT-LVL IV: CPT | Mod: PBBFAC,,, | Performed by: OBSTETRICS & GYNECOLOGY

## 2022-05-17 NOTE — PROGRESS NOTES
Subjective:   Chief Complaint:  Follow-up       Patient ID: Erica Masterson is a  76 y.o. female.    02/15/2022  She presents today due to the following:    She was recently seen by her primary care physician due to an approximately 3 week history of vaginal pain.  She denies any vaginal bleeding.  She does have a history of hysterectomy for benign indications.      Additional issues include a history of thyroid cancer status post Thyroidectomy in 2 parts.      She was found to have hematuria and started on a antibiotic for possible underlying urinary tract infection (subsequent urine culture was negative).    She reports the pain is worse with movement particularly during her physical therapy sessions.  She is receiving physical therapy for left shoulder issues.    2022  76-YO female presents for 3 month follow after starting premarin cream. She reports tolerating the cream well and reports that it has helped her symptoms. She does report that the cost is high. She stopped using the cream about 2 weeks ago since she was feeling better.       GYN & OB History  No LMP recorded. Patient has had a hysterectomy.   Date of Last Pap: Patient had a hysterectomy (TVH).    OB History    Para Term  AB Living   2 2 2 0 0 2   SAB IAB Ectopic Multiple Live Births   0 0 0 0 0      # Outcome Date GA Lbr William/2nd Weight Sex Delivery Anes PTL Lv   2 Term            1 Term               Obstetric Comments   Vag Del x 2       Past Medical History:   Diagnosis Date    Allergy     Anxiety     Cancer     Cataract     Colon polyp     Degenerative arthritis of knee 2012    Diverticulosis     Encounter for blood transfusion     GERD (gastroesophageal reflux disease)     History of thyroid cancer     Hyperlipidemia     Hypertension     Multinodular goiter 2012    Myopathy, unspecified 2010    Tuberculosis         Past Surgical History:   Procedure Laterality Date    BREAST  BIOPSY Left 2015    benign    CHOLECYSTECTOMY      COLONOSCOPY  12/2/15    Dr. Britton, multiple polyps, recheck five years-three years if more than 2 polyps are adenomatous    COLONOSCOPY N/A 12/2/2015    COLONOSCOPY N/A 3/20/2019    Procedure: COLONOSCOPY;  Surgeon: Jose Britton MD;  Location: [facility] ENDO;  Service: Endoscopy;  Laterality: N/A;    COLONOSCOPY N/A 5/20/2020    Dr. Britton; internal hemorrhoids; diverticulosis; polyps removed; repeat in 3 years    CYSTOSCOPY N/A 8/26/2020    Procedure: CYSTOSCOPY;  Surgeon: Charlotte Walters Jr., MD;  Location: [facility] OR;  Service: Urology;  Laterality: N/A;    DISSECTION OF NECK Left 9/13/2021    Procedure: DISSECTION, NECK;  Surgeon: Ryan Torres MD;  [facility];  Service: ENT;  Laterality: Left;    ESOPHAGOGASTRODUODENOSCOPY N/A 5/20/2020    Dr. Britton; small hiatal hernia; gastritis; 8 gastric polyps removed    ESOPHAGOGASTRODUODENOSCOPY N/A 4/1/2022    Procedure: EGD (ESOPHAGOGASTRODUODENOSCOPY);  Surgeon: Jose Britton MD;  Location: [facility] ENDO;  Service: Endoscopy;  Laterality: N/A;    FOOT SURGERY      HYSTERECTOMY      TVH/BSO > 20 years    INTRALUMINAL GASTROINTESTINAL TRACT IMAGING VIA CAPSULE N/A 6/4/2020    Procedure: IMAGING PROCEDURE, GI TRACT, INTRALUMINAL, VIA CAPSULE;  Surgeon: Jose Britton MD;  Location: [facility];  Service: Endoscopy;  Laterality: N/A;    KNEE SURGERY  06/06/2013    right knee tear Dr Iverson     LAPAROSCOPIC CHOLECYSTECTOMY N/A 9/17/2020    Procedure: CHOLECYSTECTOMY, LAPAROSCOPIC;  Surgeon: Forrest Veronica MD;  Location: [facility] OR;  Service: General;  Laterality: N/A;    LUNG LOBECTOMY  1966    right middle lobectomy, due to Tb    OOPHORECTOMY      SHOULDER SURGERY      francis     THYROIDECTOMY Bilateral 5/19/2021    Procedure: THYROIDECTOMY;  Surgeon: Jamia Amezquita MD;  [facility];  Service: General;  Laterality: Bilateral;    THYROIDECTOMY Bilateral 9/13/2021     Procedure: THYROIDECTOMY WITH INTRA-OP PTH;  Surgeon: Ryan Torres MD;  [facility] OR [address] [place] [address];  Service: ENT;  Laterality: Bilateral;  NIM tube  Intraop PTH        Review of Systems  Review of Systems   Constitutional: Negative for fever and unexpected weight change.   HENT: Negative.    Respiratory: Negative for cough and shortness of breath.    Cardiovascular: Negative for chest pain.   Gastrointestinal: Negative for abdominal pain, nausea and vomiting.   Genitourinary: Negative for dysuria and urgency.   Musculoskeletal: Negative for myalgias.   Integumentary:  Negative for rash.   Neurological: Negative.  Negative for headaches.   Breast: negative.          Objective:      Vitals:    05/17/22 0959   BP: (!) 140/64   Pulse: 72   Resp: 19     Physical Exam:   Constitutional: She appears well-developed.    HENT:   Head: Normocephalic and atraumatic.    Eyes: Conjunctivae are normal. No scleral icterus.      Pulmonary/Chest: Effort normal.                       Skin: No rash noted.           Assessment:        1. Atrophic vaginitis          Plan:      Atrophic vaginitis     The above was reviewed and discussed with the patient.      Discussed the need to use cream 1-2x per week.   Will send medication to compounding pharmacy. They will contact the patient and mail the medicine to the patient.   Proper use, potential side effects and issues associated with medications prescribed were reviewed and discussed with the patient.       Follow up in 1 year for annual exam or as needed.     Her questions were answered, and she is in agreement with the plan.

## 2022-05-19 ENCOUNTER — OFFICE VISIT (OUTPATIENT)
Dept: GASTROENTEROLOGY | Facility: CLINIC | Age: 77
End: 2022-05-19
Payer: MEDICARE

## 2022-05-19 VITALS — HEIGHT: 66 IN | BODY MASS INDEX: 24.36 KG/M2

## 2022-05-19 DIAGNOSIS — K44.9 HIATAL HERNIA: ICD-10-CM

## 2022-05-19 DIAGNOSIS — Z86.010 HISTORY OF COLON POLYPS: ICD-10-CM

## 2022-05-19 DIAGNOSIS — K21.9 GASTROESOPHAGEAL REFLUX DISEASE, UNSPECIFIED WHETHER ESOPHAGITIS PRESENT: ICD-10-CM

## 2022-05-19 DIAGNOSIS — Z85.850 HISTORY OF THYROID CANCER: ICD-10-CM

## 2022-05-19 DIAGNOSIS — R13.10 DYSPHAGIA, UNSPECIFIED TYPE: ICD-10-CM

## 2022-05-19 DIAGNOSIS — R11.0 NAUSEA: ICD-10-CM

## 2022-05-19 DIAGNOSIS — M54.2 NECK PAIN: ICD-10-CM

## 2022-05-19 DIAGNOSIS — M62.9 MUSCULOSKELETAL DISORDER INVOLVING UPPER TRAPEZIUS MUSCLE: Primary | ICD-10-CM

## 2022-05-19 DIAGNOSIS — F41.9 ANXIETY: ICD-10-CM

## 2022-05-19 DIAGNOSIS — Z80.0 FAMILY HISTORY OF COLON CANCER: ICD-10-CM

## 2022-05-19 DIAGNOSIS — K22.89 PRESBYESOPHAGUS: ICD-10-CM

## 2022-05-19 DIAGNOSIS — R09.A2 GLOBUS SENSATION: ICD-10-CM

## 2022-05-19 PROCEDURE — 3288F PR FALLS RISK ASSESSMENT DOCUMENTED: ICD-10-PCS | Mod: CPTII,S$GLB,,

## 2022-05-19 PROCEDURE — 99214 OFFICE O/P EST MOD 30 MIN: CPT | Mod: S$GLB,,,

## 2022-05-19 PROCEDURE — 1101F PR PT FALLS ASSESS DOC 0-1 FALLS W/OUT INJ PAST YR: ICD-10-PCS | Mod: CPTII,S$GLB,,

## 2022-05-19 PROCEDURE — 1160F PR REVIEW ALL MEDS BY PRESCRIBER/CLIN PHARMACIST DOCUMENTED: ICD-10-PCS | Mod: CPTII,S$GLB,,

## 2022-05-19 PROCEDURE — 3288F FALL RISK ASSESSMENT DOCD: CPT | Mod: CPTII,S$GLB,,

## 2022-05-19 PROCEDURE — 99999 PR PBB SHADOW E&M-EST. PATIENT-LVL IV: CPT | Mod: PBBFAC,,,

## 2022-05-19 PROCEDURE — 99214 PR OFFICE/OUTPT VISIT, EST, LEVL IV, 30-39 MIN: ICD-10-PCS | Mod: S$GLB,,,

## 2022-05-19 PROCEDURE — 1159F MED LIST DOCD IN RCRD: CPT | Mod: CPTII,S$GLB,,

## 2022-05-19 PROCEDURE — 1159F PR MEDICATION LIST DOCUMENTED IN MEDICAL RECORD: ICD-10-PCS | Mod: CPTII,S$GLB,,

## 2022-05-19 PROCEDURE — 1101F PT FALLS ASSESS-DOCD LE1/YR: CPT | Mod: CPTII,S$GLB,,

## 2022-05-19 PROCEDURE — 99999 PR PBB SHADOW E&M-EST. PATIENT-LVL IV: ICD-10-PCS | Mod: PBBFAC,,,

## 2022-05-19 PROCEDURE — 1160F RVW MEDS BY RX/DR IN RCRD: CPT | Mod: CPTII,S$GLB,,

## 2022-05-19 NOTE — PROGRESS NOTES
Subjective:       Patient ID: Erica Masterson is a 76 y.o. female Body mass index is 24.36 kg/m².    Chief Complaint: Follow-up    Established patient of Dr. Britton and myself.     GI Problem  The primary symptoms include nausea (improved; started months ago; currently taking zofran PRN with relief; reports sitting upright and erect helps improve symptom as well). Primary symptoms do not include fever, weight loss, fatigue, vomiting, diarrhea, melena, hematemesis, jaundice, hematochezia, dysuria, abdominal pain, arthralgias or rash. Reports pain to trapezium muscle that radiates to neck, but stops at the level of thyroid.The pain began more than 2 days ago. The pain is located in the trapezius muscle area. Pain radiation: throat. The severity of the pain is 6/10- currently seeing ortho and has received injections in the past for treatment with little improvement. The pain is relieved by certain positions (sitting upright and erect).   The illness is also significant for dysphagia (globus sensation - feels like a lump is in her throat; denies dysphagia; history of thyroid cancer) and constipation (resolved; reports having 4 bowel movements a week rated 4 on Jessamine scale; denies straining; denies straining; currently taking Miralax twice daily; past treatment: Linzess PRN - stopped after a few days r/t diarrhea). The illness does not include chills, anorexia, odynophagia, bloating, back pain or itching. Associated symptoms comments: Abdominal CT 03/27/2022 - Findings suspicious for colonic constipation with increased stool burden in the interval. Significant associated medical issues include GERD (improved; currently taking protonix 40 mg once daily; EGD 04/01/2022 - Normal esophagus. Z-line regular, 37 cm from the incisors. Medium-sized hiatal hernia. Thirteen gastric polyps. Resected and retrieved. Gastritis. Biopsied. Normal examined duodenum; patho benign, no bacteria ). Associated medical issues do not include  inflammatory bowel disease, gallstones, liver disease, alcohol abuse, PUD, gastric bypass, bowel resection, irritable bowel syndrome, hemorrhoids or diverticulitis.     Review of Systems   Constitutional: Negative for activity change, appetite change, chills, diaphoresis, fatigue, fever, unexpected weight change and weight loss.   HENT: Negative for sore throat and trouble swallowing.    Respiratory: Negative for cough, choking and shortness of breath.    Cardiovascular: Negative for chest pain.   Gastrointestinal: Positive for constipation (resolved; reports having 4 bowel movements a week rated 4 on Montrose scale; denies straining; denies straining; currently taking Miralax twice daily; past treatment: Linzess PRN - stopped after a few days r/t diarrhea), dysphagia (globus sensation - feels like a lump is in her throat; denies dysphagia; history of thyroid cancer) and nausea (improved; started months ago; currently taking zofran PRN with relief; reports sitting upright and erect helps improve symptom as well). Negative for abdominal pain, abdominal distention, anal bleeding, anorexia, bloating, blood in stool, diarrhea, hematemesis, hematochezia, jaundice, melena, rectal pain and vomiting.   Genitourinary: Negative for dysuria.   Musculoskeletal: Positive for neck pain. Negative for arthralgias and back pain.   Skin: Negative for itching and rash.       No LMP recorded. Patient has had a hysterectomy.  Past Medical History:   Diagnosis Date    Allergy     Anxiety     Cancer     Cataract     Colon polyp     Degenerative arthritis of knee 04/03/2012    Diverticulosis     Encounter for blood transfusion     GERD (gastroesophageal reflux disease)     History of thyroid cancer 2021    Hyperlipidemia     Hypertension     Multinodular goiter 03/26/2012    Myopathy, unspecified 01/18/2010    Tuberculosis      Past Surgical History:   Procedure Laterality Date    BREAST BIOPSY Left 2015    benign     CHOLECYSTECTOMY      COLONOSCOPY  12/2/15    Dr. Britton, multiple polyps, recheck five years-three years if more than 2 polyps are adenomatous    COLONOSCOPY N/A 12/2/2015    COLONOSCOPY N/A 3/20/2019    Procedure: COLONOSCOPY;  Surgeon: Jose Britton MD;  Location: [facility];  Service: Endoscopy;  Laterality: N/A;    COLONOSCOPY N/A 5/20/2020    Dr. Britton; internal hemorrhoids; diverticulosis; polyps removed; repeat in 3 years    CYSTOSCOPY N/A 8/26/2020    Procedure: CYSTOSCOPY;  Surgeon: Charlotte Walters Jr., MD;  Location: [facility] OR;  Service: Urology;  Laterality: N/A;    DISSECTION OF NECK Left 9/13/2021    Procedure: DISSECTION, NECK;  Surgeon: Ryan Torres MD;  [facility];  Service: ENT;  Laterality: Left;    ESOPHAGOGASTRODUODENOSCOPY N/A 5/20/2020    Dr. Britton; small hiatal hernia; gastritis; 8 gastric polyps removed    ESOPHAGOGASTRODUODENOSCOPY N/A 4/1/2022    Procedure: EGD (ESOPHAGOGASTRODUODENOSCOPY);  Surgeon: Jose Britton MD;  Location: [facility];  Service: Endoscopy;  Laterality: N/A;    FOOT SURGERY      HYSTERECTOMY      TVH/BSO > 20 years    INTRALUMINAL GASTROINTESTINAL TRACT IMAGING VIA CAPSULE N/A 6/4/2020    Procedure: IMAGING PROCEDURE, GI TRACT, INTRALUMINAL, VIA CAPSULE;  Surgeon: Jose Britton MD;  Location: [facility];  Service: Endoscopy;  Laterality: N/A;    KNEE SURGERY  06/06/2013    right knee tear Dr Iverson     LAPAROSCOPIC CHOLECYSTECTOMY N/A 9/17/2020    Procedure: CHOLECYSTECTOMY, LAPAROSCOPIC;  Surgeon: Forrest Veronica MD;  Location: [facility];  Service: General;  Laterality: N/A;    LUNG LOBECTOMY  1966    right middle lobectomy, due to Tb    OOPHORECTOMY      SHOULDER SURGERY      francis     THYROIDECTOMY Bilateral 5/19/2021    Procedure: THYROIDECTOMY;  Surgeon: Jamia Amezquita MD;  [facility];  Service: General;  Laterality: Bilateral;    THYROIDECTOMY Bilateral 9/13/2021    Procedure: THYROIDECTOMY WITH  INTRA-OP PTH;  Surgeon: Ryan Torres MD;  Location: [facility] OR [address] [place] [address];  Service: ENT;  Laterality: Bilateral;  NIM tube  Intraop PTH     Family History   Problem Relation Age of Onset    Colon cancer Other     Cancer Mother     Hypertension Mother     Hyperlipidemia Mother     Cancer Brother     Hypertension Father     Emphysema Father     Diabetes Son     Hypertension Son     Alcohol abuse Son     Diabetes Maternal Aunt     Alzheimer's disease Maternal Uncle     Cancer Maternal Grandmother     No Known Problems Daughter     Dementia Sister     Alzheimer's disease Sister     Breast cancer Sister 60    Obesity Paternal Uncle     Prostate cancer Other     Melanoma Neg Hx     Psoriasis Neg Hx     Lupus Neg Hx     Eczema Neg Hx     Amblyopia Neg Hx     Blindness Neg Hx     Cataracts Neg Hx     Glaucoma Neg Hx     Macular degeneration Neg Hx     Retinal detachment Neg Hx     Strabismus Neg Hx     Stroke Neg Hx     Thyroid cancer Neg Hx     Colon polyps Neg Hx     Ulcerative colitis Neg Hx     Stomach cancer Neg Hx     Rectal cancer Neg Hx      Social History     Tobacco Use    Smoking status: Never Smoker    Smokeless tobacco: Never Used   Substance Use Topics    Alcohol use: Not Currently     Comment: stopped 2019    Drug use: No     Wt Readings from Last 10 Encounters:   05/17/22 68.5 kg (150 lb 14.5 oz)   05/05/22 67.7 kg (149 lb 4 oz)   04/28/22 67.6 kg (149 lb)   04/26/22 67.6 kg (149 lb)   04/25/22 67.5 kg (148 lb 13 oz)   04/04/22 68.2 kg (150 lb 7.4 oz)   03/29/22 69.5 kg (153 lb 3.5 oz)   03/28/22 66.7 kg (147 lb)   03/27/22 66.7 kg (147 lb)   03/22/22 67.5 kg (148 lb 13 oz)     Lab Results   Component Value Date    WBC 4.75 04/28/2022    HGB 11.3 (L) 04/28/2022    HCT 35.3 (L) 04/28/2022    MCV 91 04/28/2022     04/28/2022     CMP  Sodium   Date Value Ref Range Status   04/28/2022 144 136 - 145 mmol/L Final     Potassium   Date Value Ref Range Status   04/28/2022  4.2 3.5 - 5.1 mmol/L Final     Chloride   Date Value Ref Range Status   04/28/2022 104 95 - 110 mmol/L Final     CO2   Date Value Ref Range Status   04/28/2022 27 23 - 29 mmol/L Final     Glucose   Date Value Ref Range Status   04/28/2022 83 70 - 110 mg/dL Final     BUN   Date Value Ref Range Status   04/28/2022 18 8 - 23 mg/dL Final     Creatinine   Date Value Ref Range Status   04/28/2022 1.1 0.5 - 1.4 mg/dL Final   06/04/2013 0.9 0.5 - 1.4 mg/dL Final     Calcium   Date Value Ref Range Status   04/28/2022 8.1 (L) 8.7 - 10.5 mg/dL Final   06/04/2013 9.7 8.7 - 10.5 mg/dL Final     Total Protein   Date Value Ref Range Status   04/28/2022 7.2 6.0 - 8.4 g/dL Final     Albumin   Date Value Ref Range Status   04/28/2022 3.7 3.5 - 5.2 g/dL Final     Total Bilirubin   Date Value Ref Range Status   04/28/2022 0.6 0.1 - 1.0 mg/dL Final     Comment:     For infants and newborns, interpretation of results should be based  on gestational age, weight and in agreement with clinical  observations.    Premature Infant recommended reference ranges:  Up to 24 hours.............<8.0 mg/dL  Up to 48 hours............<12.0 mg/dL  3-5 days..................<15.0 mg/dL  6-29 days.................<15.0 mg/dL       Alkaline Phosphatase   Date Value Ref Range Status   04/28/2022 41 (L) 55 - 135 U/L Final     AST   Date Value Ref Range Status   04/28/2022 16 10 - 40 U/L Final     ALT   Date Value Ref Range Status   04/28/2022 17 10 - 44 U/L Final     Anion Gap   Date Value Ref Range Status   04/28/2022 13 8 - 16 mmol/L Final   06/04/2013 9 5 - 15 meq/L Final     eGFR if    Date Value Ref Range Status   04/28/2022 56 (A) >60 mL/min/1.73 m^2 Final     eGFR if non    Date Value Ref Range Status   04/28/2022 48 (A) >60 mL/min/1.73 m^2 Final     Comment:     Calculation used to obtain the estimated glomerular filtration  rate (eGFR) is the CKD-EPI equation.        Lab Results   Component Value Date    AMYLASE 106  02/22/2016     Lab Results   Component Value Date    LIPASE 6 04/28/2022     Lab Results   Component Value Date    TSH 0.013 (L) 05/05/2022       Reviewed prior medical records including radiology report of abdominal CT 03/27/2022, abdominal CT 03/21/2022, abdominal CT 03/16/2022 & endoscopy history of colonoscopy 11/16/2020 & EGD 04/01/2022 (see surgical history).    Objective:      Physical Exam  Vitals and nursing note reviewed.   Constitutional:       General: She is not in acute distress.     Appearance: Normal appearance. She is normal weight. She is not ill-appearing.   HENT:      Mouth/Throat:      Comments: Unable to assess due to COVID-19 concerns.  Eyes:      Extraocular Movements: Extraocular movements intact.      Pupils: Pupils are equal, round, and reactive to light.   Cardiovascular:      Rate and Rhythm: Normal rate and regular rhythm.   Pulmonary:      Effort: Pulmonary effort is normal. No respiratory distress.      Breath sounds: Normal breath sounds.   Abdominal:      General: Abdomen is flat. Bowel sounds are normal. There is no distension or abdominal bruit. There are no signs of injury.      Palpations: There is no shifting dullness, fluid wave, hepatomegaly, splenomegaly or mass.      Tenderness: There is no abdominal tenderness. There is no guarding or rebound. Negative signs include Rashid's sign, Rovsing's sign and McBurney's sign.      Hernia: No hernia is present.   Skin:     General: Skin is warm and dry.      Coloration: Skin is not jaundiced or pale.   Neurological:      Mental Status: She is alert and oriented to person, place, and time.   Psychiatric:         Attention and Perception: Attention normal.         Mood and Affect: Mood normal.         Speech: Speech normal.         Behavior: Behavior normal.         Assessment:       1. Musculoskeletal disorder involving upper trapezius muscle    2. Anxiety    3. Globus sensation    4. History of thyroid cancer    5. Dysphagia,  unspecified type    6. Neck pain    7. Nausea    8. Gastroesophageal reflux disease, unspecified whether esophagitis present    9. History of colon polyps    10. Family history of colon cancer        Plan:       Musculoskeletal disorder involving upper trapezius muscle  -Follow-up with ortho for continued evaluation and management    Anxiety  -     Ambulatory referral/consult to Psychiatry; Future; Expected date: 05/26/2022    Globus sensation  -Continue with scheduled esophagram     History of thyroid cancer  -Follow-up with Dr. Jamia Amezquita for further evaluation and management     Dysphagia, unspecified type  -Continue with scheduled esophagram   - educated patient to eat smaller more frequent meals and to eat slowly and advised to eat a soft diet.  - possible esophageal manometry if symptoms persist  -Continue protonix 40 mg once daily    Neck pain  -Follow-up with ortho for continued evaluation and management    Nausea  -Continue with Zofran as prescribed    Gastroesophageal reflux disease, unspecified whether esophagitis present  -discussed about the different types of medications used to treat reflux and how to use them, antacids can be used PRN for breakthrough heartburn symptoms by reducing stomach acid that is already produced, H2 blockers work by limiting the amount acid production, & PPI's work to block acid production and are taken daily, patient verbalized understanding.  -Educated patient on lifestyle modifications to help control/reduce reflux/abdominal pain including: avoid large meals, avoid eating within 2-3 hours of bedtime (avoid late night eating & lying down soon after eating), elevate head of bed if nocturnal symptoms are present, smoking cessation (if current smoker), & weight loss (if overweight).   -Educated to avoid known foods which trigger reflux symptoms & to minimize/avoid high-fat foods, chocolate, caffeine, citrus, alcohol, & tomato products.  -Advised to avoid/limit use of  NSAID's, since they can cause GI upset, bleeding, and/or ulcers. If needed, take with food.    -Continue: protonix 40 mg once daily     History of colon polyps & Family history of colon cancer  Follow-up for surveillance colonoscopy 11/2023    Follow up in about 4 weeks (around 6/16/2022), or if symptoms worsen or fail to improve.      If no improvement in symptoms or symptoms worsen, call/follow-up at clinic or go to ER.        30 minutes of total time spent on the encounter, which includes face to face time and non-face to face time preparing to see the patient (eg, review of tests), Obtaining and/or reviewing separately obtained history, Documenting clinical information in the electronic or other health record, Independently interpreting results (not separately reported) and communicating results to the patient/family/caregiver, or Care coordination (not separately reported).

## 2022-05-25 ENCOUNTER — TELEPHONE (OUTPATIENT)
Dept: GASTROENTEROLOGY | Facility: CLINIC | Age: 77
End: 2022-05-25
Payer: MEDICARE

## 2022-05-25 DIAGNOSIS — I10 HYPERTENSION, ESSENTIAL: ICD-10-CM

## 2022-05-25 RX ORDER — LOSARTAN POTASSIUM 100 MG/1
TABLET ORAL
Qty: 90 TABLET | Refills: 1 | Status: SHIPPED | OUTPATIENT
Start: 2022-05-25 | End: 2022-12-12

## 2022-05-25 NOTE — TELEPHONE ENCOUNTER
No new care gaps identified.  [facility] Embedded Care Gaps. Reference number: [id number]. 5/25/2022   7:59:41 AM DALILAT

## 2022-05-25 NOTE — TELEPHONE ENCOUNTER
Refill Routing Note   Medication(s) are not appropriate for processing by [facility] for the following reason(s):      - Required vitals are abnormal    [facility] action(s):  Defer       Medication Therapy Plan: Last BP 05/17/22 (!) 140/64  Medication reconciliation completed: No     Appointments  past 12m or future 3m with PCP    Date Provider   Last Visit   5/5/2022 Narayan Myers MD   Next Visit   6/6/2022 Narayan Myers MD   ED visits in past 90 days: 3        Note composed:11:23 AM 05/25/2022

## 2022-05-25 NOTE — TELEPHONE ENCOUNTER
----- Message from Rowena  sent at 5/25/2022  2:28 PM CDT -----  Regarding: appt access  Type: Needs Medical Advice    Who Called:      Best Call Back Number:     Additional Information: Requesting a call back from Nurse, regarding pt wants to know when her follow up appt is nurse didn't give it to her this morning ,please advise and call back

## 2022-05-26 ENCOUNTER — HOSPITAL ENCOUNTER (OUTPATIENT)
Dept: RADIOLOGY | Facility: HOSPITAL | Age: 77
Discharge: HOME | End: 2022-05-26
Payer: MEDICARE

## 2022-05-26 DIAGNOSIS — R13.10 DYSPHAGIA, UNSPECIFIED TYPE: ICD-10-CM

## 2022-05-26 DIAGNOSIS — R93.3 ABNORMAL BARIUM SWALLOW: ICD-10-CM

## 2022-05-26 PROCEDURE — 74220 X-RAY XM ESOPHAGUS 1CNTRST: CPT | Mod: 26,,, | Performed by: RADIOLOGY

## 2022-05-26 PROCEDURE — 74220 FL ESOPHAGRAM COMPLETE: ICD-10-PCS | Mod: 26,,, | Performed by: RADIOLOGY

## 2022-05-26 PROCEDURE — 25500020 PHARM REV CODE 255

## 2022-05-26 PROCEDURE — A9698 NON-RAD CONTRAST MATERIALNOC: HCPCS

## 2022-05-26 PROCEDURE — 74220 X-RAY XM ESOPHAGUS 1CNTRST: CPT | Mod: TC

## 2022-05-26 RX ADMIN — BARIUM SULFATE 280 ML: 0.6 SUSPENSION ORAL at 02:05

## 2022-05-30 ENCOUNTER — OFFICE VISIT (OUTPATIENT)
Dept: ORTHOPEDICS | Facility: CLINIC | Age: 77
End: 2022-05-30
Payer: MEDICARE

## 2022-05-30 VITALS — WEIGHT: 150 LBS | BODY MASS INDEX: 24.11 KG/M2 | RESPIRATION RATE: 18 BRPM | HEIGHT: 66 IN

## 2022-05-30 DIAGNOSIS — M25.512 LEFT SHOULDER PAIN, UNSPECIFIED CHRONICITY: ICD-10-CM

## 2022-05-30 DIAGNOSIS — G52.8 SPINAL ACCESSORY NERVE DISORDER: Primary | ICD-10-CM

## 2022-05-30 PROCEDURE — 1125F AMNT PAIN NOTED PAIN PRSNT: CPT | Mod: CPTII,S$GLB,, | Performed by: ORTHOPAEDIC SURGERY

## 2022-05-30 PROCEDURE — 99213 OFFICE O/P EST LOW 20 MIN: CPT | Mod: S$GLB,,, | Performed by: ORTHOPAEDIC SURGERY

## 2022-05-30 PROCEDURE — 99999 PR PBB SHADOW E&M-EST. PATIENT-LVL III: ICD-10-PCS | Mod: PBBFAC,,, | Performed by: ORTHOPAEDIC SURGERY

## 2022-05-30 PROCEDURE — 1101F PR PT FALLS ASSESS DOC 0-1 FALLS W/OUT INJ PAST YR: ICD-10-PCS | Mod: CPTII,S$GLB,, | Performed by: ORTHOPAEDIC SURGERY

## 2022-05-30 PROCEDURE — 1101F PT FALLS ASSESS-DOCD LE1/YR: CPT | Mod: CPTII,S$GLB,, | Performed by: ORTHOPAEDIC SURGERY

## 2022-05-30 PROCEDURE — 1160F RVW MEDS BY RX/DR IN RCRD: CPT | Mod: CPTII,S$GLB,, | Performed by: ORTHOPAEDIC SURGERY

## 2022-05-30 PROCEDURE — 99213 PR OFFICE/OUTPT VISIT, EST, LEVL III, 20-29 MIN: ICD-10-PCS | Mod: S$GLB,,, | Performed by: ORTHOPAEDIC SURGERY

## 2022-05-30 PROCEDURE — 3288F PR FALLS RISK ASSESSMENT DOCUMENTED: ICD-10-PCS | Mod: CPTII,S$GLB,, | Performed by: ORTHOPAEDIC SURGERY

## 2022-05-30 PROCEDURE — 1160F PR REVIEW ALL MEDS BY PRESCRIBER/CLIN PHARMACIST DOCUMENTED: ICD-10-PCS | Mod: CPTII,S$GLB,, | Performed by: ORTHOPAEDIC SURGERY

## 2022-05-30 PROCEDURE — 1159F MED LIST DOCD IN RCRD: CPT | Mod: CPTII,S$GLB,, | Performed by: ORTHOPAEDIC SURGERY

## 2022-05-30 PROCEDURE — 1159F PR MEDICATION LIST DOCUMENTED IN MEDICAL RECORD: ICD-10-PCS | Mod: CPTII,S$GLB,, | Performed by: ORTHOPAEDIC SURGERY

## 2022-05-30 PROCEDURE — 99999 PR PBB SHADOW E&M-EST. PATIENT-LVL III: CPT | Mod: PBBFAC,,, | Performed by: ORTHOPAEDIC SURGERY

## 2022-05-30 PROCEDURE — 3288F FALL RISK ASSESSMENT DOCD: CPT | Mod: CPTII,S$GLB,, | Performed by: ORTHOPAEDIC SURGERY

## 2022-05-30 PROCEDURE — 1125F PR PAIN SEVERITY QUANTIFIED, PAIN PRESENT: ICD-10-PCS | Mod: CPTII,S$GLB,, | Performed by: ORTHOPAEDIC SURGERY

## 2022-05-30 NOTE — PROGRESS NOTES
Past Medical History:   Diagnosis Date    Allergy     Anxiety     Cancer     Cataract     Colon polyp     Degenerative arthritis of knee 04/03/2012    Diverticulosis     Encounter for blood transfusion     GERD (gastroesophageal reflux disease)     History of thyroid cancer 2021    Hyperlipidemia     Hypertension     Multinodular goiter 03/26/2012    Myopathy, unspecified 01/18/2010    Tuberculosis        Past Surgical History:   Procedure Laterality Date    BREAST BIOPSY Left 2015    benign    CHOLECYSTECTOMY      COLONOSCOPY  12/2/15    Dr. Britton, multiple polyps, recheck five years-three years if more than 2 polyps are adenomatous    COLONOSCOPY N/A 12/2/2015    COLONOSCOPY N/A 3/20/2019    Procedure: COLONOSCOPY;  Surgeon: Jose Britton MD;  Location: [facility];  Service: Endoscopy;  Laterality: N/A;    COLONOSCOPY N/A 5/20/2020    Dr. Britton; internal hemorrhoids; diverticulosis; polyps removed; repeat in 3 years    CYSTOSCOPY N/A 8/26/2020    Procedure: CYSTOSCOPY;  Surgeon: Charlotte Walters Jr., MD;  Location: [facility] OR;  Service: Urology;  Laterality: N/A;    DISSECTION OF NECK Left 9/13/2021    Procedure: DISSECTION, NECK;  Surgeon: Ryan Torres MD;  Location: [facility] OR [address] [place] [address];  Service: ENT;  Laterality: Left;    ESOPHAGOGASTRODUODENOSCOPY N/A 5/20/2020    Dr. Britton; small hiatal hernia; gastritis; 8 gastric polyps removed    ESOPHAGOGASTRODUODENOSCOPY N/A 4/1/2022    Procedure: EGD (ESOPHAGOGASTRODUODENOSCOPY);  Surgeon: Jose Britton MD;  Location: [facility];  Service: Endoscopy;  Laterality: N/A;    FOOT SURGERY      HYSTERECTOMY      TVH/BSO > 20 years    INTRALUMINAL GASTROINTESTINAL TRACT IMAGING VIA CAPSULE N/A 6/4/2020    Procedure: IMAGING PROCEDURE, GI TRACT, INTRALUMINAL, VIA CAPSULE;  Surgeon: Jose Britton MD;  Location: [facility];  Service: Endoscopy;  Laterality: N/A;    KNEE SURGERY  06/06/2013    right knee tear Dr Iverson     LAPAROSCOPIC  CHOLECYSTECTOMY N/A 9/17/2020    Procedure: CHOLECYSTECTOMY, LAPAROSCOPIC;  Surgeon: Forrest Veronica MD;  Location: [facility] OR;  Service: General;  Laterality: N/A;    LUNG LOBECTOMY  1966    right middle lobectomy, due to Tb    OOPHORECTOMY      SHOULDER SURGERY      francis     THYROIDECTOMY Bilateral 5/19/2021    Procedure: THYROIDECTOMY;  Surgeon: Jamia Amezquita MD;  [facility];  Service: General;  Laterality: Bilateral;    THYROIDECTOMY Bilateral 9/13/2021    Procedure: THYROIDECTOMY WITH INTRA-OP PTH;  Surgeon: Ryan Torres MD;  [facility];  Service: ENT;  Laterality: Bilateral;  NIM tube  Intraop PTH       Current Outpatient Medications   Medication Sig    amLODIPine (NORVASC) 2.5 MG tablet Take 1 tablet by mouth once daily    blood sugar diagnostic (BLOOD GLUCOSE TEST) Strp True Metrix glucose strips check once daily    blood-glucose meter kit True Metrix glucometer check glucose once daily    calcium carbonate (TUMS) 200 mg calcium (500 mg) chewable tablet Chew and swallow 2 tablets (1,000 mg total) by mouth 3 (three) times daily. for 14 days    carboxymethylcellulose sodium (REFRESH OPHT) Apply 1 drop to eye daily as needed.    conjugated estrogens (PREMARIN) vaginal cream Place 0.5 g vaginally once daily AND 0.5 g twice a week. (Patient taking differently: Place 0.5 g vaginally once daily AND 0.5 g twice a week. Saturdays and tuesdays)    doxepin (SINEQUAN) 10 MG capsule Take 2 capsules (20 mg total) by mouth every evening.    ergocalciferol (ERGOCALCIFEROL) 50,000 unit Cap Take 1 capsule (50,000 Units total) by mouth every 30 days.    gabapentin (NEURONTIN) 300 MG capsule Take 1 capsule (300 mg total) by mouth 2 (two) times daily.    levothyroxine (SYNTHROID) 100 MCG tablet Take 1 tablet (100 mcg total) by mouth before breakfast.    losartan (COZAAR) 100 MG tablet Take 1 tablet by mouth once daily    metFORMIN (GLUCOPHAGE-XR) 500 MG ER 24hr tablet      methocarbamoL (ROBAXIN) 500 MG Tab Take 500 mg by mouth 4 (four) times daily.    pantoprazole (PROTONIX) 40 MG tablet Take 1 tablet (40 mg total) by mouth once daily.    polyethylene glycol (GLYCOLAX) 17 gram PwPk Take 17 g by mouth once daily.    rosuvastatin (CRESTOR) 20 MG tablet Take 0.5 tablets (10 mg total) by mouth every evening.    SHINGRIX, PF, 50 mcg/0.5 mL injection     simethicone (MYLICON) 125 MG chewable tablet Take 125 mg by mouth every 6 (six) hours as needed for Flatulence.    tiZANidine (ZANAFLEX) 4 MG tablet Take 4 mg by mouth every 6 (six) hours as needed.    UNABLE TO FIND I B guard bid     No current facility-administered medications for this visit.     Facility-Administered Medications Ordered in Other Visits   Medication    electrolyte-S (ISOLYTE)       Review of patient's allergies indicates:   Allergen Reactions    No known drug allergies        Family History   Problem Relation Age of Onset    Colon cancer Other     Cancer Mother     Hypertension Mother     Hyperlipidemia Mother     Cancer Brother     Hypertension Father     Emphysema Father     Diabetes Son     Hypertension Son     Alcohol abuse Son     Diabetes Maternal Aunt     Alzheimer's disease Maternal Uncle     Cancer Maternal Grandmother     No Known Problems Daughter     Dementia Sister     Alzheimer's disease Sister     Breast cancer Sister 60    Obesity Paternal Uncle     Prostate cancer Other     Melanoma Neg Hx     Psoriasis Neg Hx     Lupus Neg Hx     Eczema Neg Hx     Amblyopia Neg Hx     Blindness Neg Hx     Cataracts Neg Hx     Glaucoma Neg Hx     Macular degeneration Neg Hx     Retinal detachment Neg Hx     Strabismus Neg Hx     Stroke Neg Hx     Thyroid cancer Neg Hx     Colon polyps Neg Hx     Ulcerative colitis Neg Hx     Stomach cancer Neg Hx     Rectal cancer Neg Hx        Social History     Socioeconomic History    Marital status:    Tobacco Use    Smoking  status: Never Smoker    Smokeless tobacco: Never Used   Substance and Sexual Activity    Alcohol use: Not Currently     Comment: stopped 2019    Drug use: No    Sexual activity: Not Currently       Chief Complaint:   Chief Complaint   Patient presents with    Left Shoulder - Pain       History of present illness: This is a 76 year-old female who is seen for left shoulder pain.  It started after a thyroidectomy on September 13th.  Patient has a lot of pain in the trapezius and neck area.  She is doing much better.  She finished the physical therapy and is now just doing it on her own.    Review of Systems:      Musculoskeletal:  See HPI        Physical Examination:    Vital Signs:    Vitals:    05/30/22 1334   Resp: 18       Body mass index is 24.21 kg/m².    This a well-developed, well nourished patient in no acute distress.  They are alert and oriented and cooperative to examination.  Pt. walks without an antalgic gait.      Examination of the left shoulder shows no rashes or erythema.  There is much improved scapular posture. There are no masses, ecchymosis, or atrophy. The patient has full range of motion in forward flexion, external rotation, and internal rotation to the mid T-spine. The patient has moderately positive impingement signs. - Van Buren's test. - Speeds test. Nontender to palpation over a.c. joint.Passive range of motion: Forward flexion of 180°, external rotation at 90° of 90°, internal rotation of 50°, and external rotation at 0° of 50°. 2+ radial pulse. Intact axillary, radial, median and ulnar sensation. 4 out of 5 resisted forward flexion, external rotation, and negative lift off test.          X-rays: X-rays of the left shoulder is reviewed which show some AC arthritis otherwise normal-appearing shoulder x-ray    MRI of the left shoulder:Tendinopathy and partial thickness articular surface tearing of the distal supraspinatus tendon.  AC joint arthrosis.  Mild osteoarthritic changes  involving the glenohumeral joint space.     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  Continue the exercises.  Continue with the soft tissue mobilization in the shoulder balancing exercises.  Follow-up in 3 months.

## 2022-05-31 ENCOUNTER — TELEPHONE (OUTPATIENT)
Dept: GASTROENTEROLOGY | Facility: CLINIC | Age: 77
End: 2022-05-31
Payer: MEDICARE

## 2022-05-31 NOTE — TELEPHONE ENCOUNTER
"----- Message from Deja Mendoza NP sent at 5/27/2022 12:08 PM CDT -----  Please call to inform & review the results with the patient - let the patient know per radiology study her esophagram showed mild presbyesophagus & a small hiatal hernia. Abnormal peristalsis after swallowing and non-peristaltic repetitive contractions, at one time attributed to old age and called "presbyesophagus," are now thought to be due to disease processes. I will send a referral to advanced GI for further investigation of symptoms. A hiatal hernia is usually managed by controlling reflux symptoms, surgery is an option, but usually performed if reflux is uncontrolled by medication management and lifestyle/dietary modifications; if symptoms persist despite medication management and lifestyle/dietary modifications, we can refer to general surgery to consult about surgical options. If symptoms worsen, call/follow-up at clinic or go to ER. Please call with any questions/concerns.  Thank you,  TESSY Mccann, FNP-C  "

## 2022-05-31 NOTE — TELEPHONE ENCOUNTER
Called patient gave test results, patient verbally understood. Patient states will wait for follow up appt with Dr Britton to discuss results.

## 2022-06-01 ENCOUNTER — TELEPHONE (OUTPATIENT)
Dept: GASTROENTEROLOGY | Facility: CLINIC | Age: 77
End: 2022-06-01
Payer: MEDICARE

## 2022-06-01 DIAGNOSIS — K21.9 GASTROESOPHAGEAL REFLUX DISEASE, UNSPECIFIED WHETHER ESOPHAGITIS PRESENT: ICD-10-CM

## 2022-06-01 DIAGNOSIS — K44.9 HIATAL HERNIA: Primary | ICD-10-CM

## 2022-06-01 NOTE — TELEPHONE ENCOUNTER
"----- Message from Anusha Chacko sent at 6/1/2022  8:52 AM CDT -----  Contact: Patient  Type: Patient Call Back         Who called: Patient         What is the request in detail: calling to be sched for a hernia procedure; please advise           Best call back number: [phone]           Additional Information: has Dr. Rodriges listed under "Admission" tab; states she prefers to seen in [place]           Thank You    "

## 2022-06-01 NOTE — TELEPHONE ENCOUNTER
----- Message from [facility] sent at 6/1/2022  8:12 AM CDT -----  Contact: self  Type:  Sooner Appointment Request    Caller is requesting a sooner appointment.  Caller declined first available appointment listed below.  Caller will not accept being placed on the waitlist and is requesting a message be sent to doctor.    Name of Caller:  patient  When is the first available appointment?  7/7/22  Symptoms:  discuss results of tests and decide what needs to be done  Best Call Back Number:  [phone] (home) or [phone]  Additional Information:  Only wants to see the doctor

## 2022-06-01 NOTE — TELEPHONE ENCOUNTER
Patient instructed already has first available with Dr. Britton on 6/21 and needs to see advanced GI and general surgery as instructed by NP

## 2022-06-02 ENCOUNTER — TELEPHONE (OUTPATIENT)
Dept: GASTROENTEROLOGY | Facility: CLINIC | Age: 77
End: 2022-06-02
Payer: MEDICARE

## 2022-06-06 ENCOUNTER — OFFICE VISIT (OUTPATIENT)
Dept: FAMILY MEDICINE | Facility: CLINIC | Age: 77
End: 2022-06-06
Attending: FAMILY MEDICINE
Payer: MEDICARE

## 2022-06-06 VITALS
RESPIRATION RATE: 17 BRPM | HEIGHT: 66 IN | WEIGHT: 150.13 LBS | HEART RATE: 84 BPM | DIASTOLIC BLOOD PRESSURE: 65 MMHG | OXYGEN SATURATION: 99 % | BODY MASS INDEX: 24.13 KG/M2 | SYSTOLIC BLOOD PRESSURE: 125 MMHG | TEMPERATURE: 98 F

## 2022-06-06 DIAGNOSIS — E11.00 TYPE 2 DIABETES MELLITUS WITH HYPEROSMOLARITY WITHOUT COMA, WITHOUT LONG-TERM CURRENT USE OF INSULIN: Chronic | ICD-10-CM

## 2022-06-06 DIAGNOSIS — E89.0 POSTOPERATIVE HYPOTHYROIDISM: Chronic | ICD-10-CM

## 2022-06-06 DIAGNOSIS — K21.9 GASTROESOPHAGEAL REFLUX DISEASE, UNSPECIFIED WHETHER ESOPHAGITIS PRESENT: ICD-10-CM

## 2022-06-06 DIAGNOSIS — C73 THYROID CANCER: Chronic | ICD-10-CM

## 2022-06-06 DIAGNOSIS — R14.0 ABDOMINAL DISTENSION: ICD-10-CM

## 2022-06-06 DIAGNOSIS — N95.2 ATROPHIC VAGINITIS: ICD-10-CM

## 2022-06-06 DIAGNOSIS — K44.9 HIATAL HERNIA: ICD-10-CM

## 2022-06-06 DIAGNOSIS — N89.8 VAGINAL DISCHARGE: Primary | ICD-10-CM

## 2022-06-06 PROCEDURE — 3288F PR FALLS RISK ASSESSMENT DOCUMENTED: ICD-10-PCS | Mod: CPTII,S$GLB,, | Performed by: FAMILY MEDICINE

## 2022-06-06 PROCEDURE — 1160F PR REVIEW ALL MEDS BY PRESCRIBER/CLIN PHARMACIST DOCUMENTED: ICD-10-PCS | Mod: CPTII,S$GLB,, | Performed by: FAMILY MEDICINE

## 2022-06-06 PROCEDURE — 99999 PR PBB SHADOW E&M-EST. PATIENT-LVL IV: CPT | Mod: PBBFAC,,, | Performed by: FAMILY MEDICINE

## 2022-06-06 PROCEDURE — 99213 PR OFFICE/OUTPT VISIT, EST, LEVL III, 20-29 MIN: ICD-10-PCS | Mod: S$GLB,,, | Performed by: FAMILY MEDICINE

## 2022-06-06 PROCEDURE — 99499 RISK ADDL DX/OHS AUDIT: ICD-10-PCS | Mod: S$GLB,,, | Performed by: FAMILY MEDICINE

## 2022-06-06 PROCEDURE — 99499 UNLISTED E&M SERVICE: CPT | Mod: S$GLB,,, | Performed by: FAMILY MEDICINE

## 2022-06-06 PROCEDURE — 99213 OFFICE O/P EST LOW 20 MIN: CPT | Mod: S$GLB,,, | Performed by: FAMILY MEDICINE

## 2022-06-06 PROCEDURE — 3074F PR MOST RECENT SYSTOLIC BLOOD PRESSURE < 130 MM HG: ICD-10-PCS | Mod: CPTII,S$GLB,, | Performed by: FAMILY MEDICINE

## 2022-06-06 PROCEDURE — 3288F FALL RISK ASSESSMENT DOCD: CPT | Mod: CPTII,S$GLB,, | Performed by: FAMILY MEDICINE

## 2022-06-06 PROCEDURE — 1125F AMNT PAIN NOTED PAIN PRSNT: CPT | Mod: CPTII,S$GLB,, | Performed by: FAMILY MEDICINE

## 2022-06-06 PROCEDURE — 1101F PT FALLS ASSESS-DOCD LE1/YR: CPT | Mod: CPTII,S$GLB,, | Performed by: FAMILY MEDICINE

## 2022-06-06 PROCEDURE — 3078F DIAST BP <80 MM HG: CPT | Mod: CPTII,S$GLB,, | Performed by: FAMILY MEDICINE

## 2022-06-06 PROCEDURE — 3074F SYST BP LT 130 MM HG: CPT | Mod: CPTII,S$GLB,, | Performed by: FAMILY MEDICINE

## 2022-06-06 PROCEDURE — 1125F PR PAIN SEVERITY QUANTIFIED, PAIN PRESENT: ICD-10-PCS | Mod: CPTII,S$GLB,, | Performed by: FAMILY MEDICINE

## 2022-06-06 PROCEDURE — 1159F PR MEDICATION LIST DOCUMENTED IN MEDICAL RECORD: ICD-10-PCS | Mod: CPTII,S$GLB,, | Performed by: FAMILY MEDICINE

## 2022-06-06 PROCEDURE — 1101F PR PT FALLS ASSESS DOC 0-1 FALLS W/OUT INJ PAST YR: ICD-10-PCS | Mod: CPTII,S$GLB,, | Performed by: FAMILY MEDICINE

## 2022-06-06 PROCEDURE — 1160F RVW MEDS BY RX/DR IN RCRD: CPT | Mod: CPTII,S$GLB,, | Performed by: FAMILY MEDICINE

## 2022-06-06 PROCEDURE — 99999 PR PBB SHADOW E&M-EST. PATIENT-LVL IV: ICD-10-PCS | Mod: PBBFAC,,, | Performed by: FAMILY MEDICINE

## 2022-06-06 PROCEDURE — 3078F PR MOST RECENT DIASTOLIC BLOOD PRESSURE < 80 MM HG: ICD-10-PCS | Mod: CPTII,S$GLB,, | Performed by: FAMILY MEDICINE

## 2022-06-06 PROCEDURE — 1159F MED LIST DOCD IN RCRD: CPT | Mod: CPTII,S$GLB,, | Performed by: FAMILY MEDICINE

## 2022-06-06 RX ORDER — LANSOPRAZOLE 30 MG/1
30 CAPSULE, DELAYED RELEASE ORAL DAILY
COMMUNITY
Start: 2022-05-25 | End: 2022-09-08

## 2022-06-06 NOTE — PROGRESS NOTES
Subjective:       Patient ID: Erica Masterson is a 76 y.o. female.    Chief Complaint: Diabetes (Pt states that she is here for her 1 mo fu ), Gas (Pt states that she has been gassy lately, patient states that it is causing some abdominal discomfort.), Vaginal Discharge (Pt states that she has been having some vaginal discharge white, with some vaginal burning, patient states that the burning is continuous not during urination, patient denies any odor, rash, itching), and Shoulder Pain (Pt states that she is still having some bilateral shoulder and neck tightness with discomfort )    76-year-old female coming in with several concerns.  She was previously being seen with chronic neck pain and chest discomfort associated with her thyroid surgery for thyroid cancer but that seems to have improved.  She was having multiple visits to the emergency room associated with anxiety and generally her symptoms resolved by the time she reached the ER.  Now she is having a white creamy vaginal discharge associated with burning but no itching.  She has not been on any antibiotics, she has not changed any laundry products or personal soap and her blood sugar control has been very well controlled.  Her thyroid is being followed by Dr. Eubanks in Endocrinology.  She was seen twice in gyn in diagnosed with atrophic vaginitis, she also has some small sebaceous cysts in the labia majora but they were asymptomatic.  There was no evidence of an infection.  She is taking estrogen cream.  She states that she still gets some reflux at night in spite of having the head of her bed elevated and trying to avoid eating or drinking before bedtime but she still drinks water frequently.  She may also have an element of gastroparesis with her diabetes especially when hypothyroidism is a possibility.  She has chronic constipation as well.    Past Medical History:  No date: Allergy  No date: Anxiety  No date: Cancer  No date: Cataract  No date: Colon  polyp  04/03/2012: Degenerative arthritis of knee  No date: Diverticulosis  No date: Encounter for blood transfusion  No date: GERD (gastroesophageal reflux disease)  2021: History of thyroid cancer  No date: Hyperlipidemia  No date: Hypertension  03/26/2012: Multinodular goiter  01/18/2010: Myopathy, unspecified  No date: Tuberculosis    Past Surgical History:  2015: BREAST BIOPSY; Left      Comment:  benign  No date: CHOLECYSTECTOMY  12/2/15: COLONOSCOPY      Comment:  Dr. Britton, multiple polyps, recheck five years-three                years if more than 2 polyps are adenomatous  12/2/2015: COLONOSCOPY; N/A  3/20/2019: COLONOSCOPY; N/A      Comment:  Procedure: COLONOSCOPY;  Surgeon: Jose Britton MD;                Location: [facility];  Service: Endoscopy;  Laterality:                N/A;  5/20/2020: COLONOSCOPY; N/A      Comment:  Dr. Britton; internal hemorrhoids; diverticulosis;                polyps removed; repeat in 3 years  8/26/2020: CYSTOSCOPY; N/A      Comment:  Procedure: CYSTOSCOPY;  Surgeon: Charlotte Walters Jr., MD;               [facility];  Service: Urology;  Laterality: N/A;  9/13/2021: DISSECTION OF NECK; Left      Comment:  Procedure: DISSECTION, NECK;  Surgeon: Ryan Torres MD;  Location: [address];  Service: ENT;                 Laterality: Left;  5/20/2020: ESOPHAGOGASTRODUODENOSCOPY; N/A      Comment:  Dr. Britton; small hiatal hernia; gastritis; 8 gastric                polyps removed  4/1/2022: ESOPHAGOGASTRODUODENOSCOPY; N/A      Comment:  Procedure: EGD (ESOPHAGOGASTRODUODENOSCOPY);  Surgeon:                Jose Britton MD;  Location: [facility];  Service:                Endoscopy;  Laterality: N/A;  No date: FOOT SURGERY  No date: HYSTERECTOMY      Comment:  TVH/BSO > 20 years  6/4/2020: INTRALUMINAL GASTROINTESTINAL TRACT IMAGING VIA CAPSULE; N/A      Comment:  Procedure: IMAGING PROCEDURE, GI TRACT, INTRALUMINAL,                VIA CAPSULE;   Surgeon: Jose Britton MD;  Location:                [facility] ENDO;  Service: Endoscopy;  Laterality: N/A;  06/06/2013: KNEE SURGERY      Comment:  right knee tear Dr Iverson   9/17/2020: LAPAROSCOPIC CHOLECYSTECTOMY; N/A      Comment:  Procedure: CHOLECYSTECTOMY, LAPAROSCOPIC;  Surgeon:                Forrest Veronica MD;  Location: [facility] OR;  Service:                General;  Laterality: N/A;  1966: LUNG LOBECTOMY      Comment:  right middle lobectomy, due to Tb  No date: OOPHORECTOMY  No date: SHOULDER SURGERY      Comment:  francis   5/19/2021: THYROIDECTOMY; Bilateral      Comment:  Procedure: THYROIDECTOMY;  Surgeon: Jamia Amezquita MD;  [facility];  Service: General;                 Laterality: Bilateral;  9/13/2021: THYROIDECTOMY; Bilateral      Comment:  Procedure: THYROIDECTOMY WITH INTRA-OP PTH;  Surgeon:                Ryan Torres MD;  [facility];  Service:               ENT;  Laterality: Bilateral;  NIM tube  Intraop PTH    Current Outpatient Medications on File Prior to Visit:  amLODIPine (NORVASC) 2.5 MG tablet, Take 1 tablet by mouth once daily, Disp: 90 tablet, Rfl: 0  blood sugar diagnostic (BLOOD GLUCOSE TEST) Strp, True Metrix glucose strips check once daily, Disp: 100 each, Rfl: 3  calcium carbonate (TUMS) 200 mg calcium (500 mg) chewable tablet, Chew and swallow 2 tablets (1,000 mg total) by mouth 3 (three) times daily. for 14 days, Disp: 100 tablet, Rfl: 0  carboxymethylcellulose sodium (REFRESH OPHT), Apply 1 drop to eye daily as needed., Disp: , Rfl:   conjugated estrogens (PREMARIN) vaginal cream, Place 0.5 g vaginally once daily AND 0.5 g twice a week. (Patient taking differently: Place 0.5 g vaginally once daily AND 0.5 g twice a week. Saturdays and tuesdays), Disp: 30 g, Rfl: 7  doxepin (SINEQUAN) 10 MG capsule, Take 2 capsules (20 mg total) by mouth every evening., Disp: , Rfl:   ergocalciferol (ERGOCALCIFEROL) 50,000 unit Cap, Take 1  capsule (50,000 Units total) by mouth every 30 days., Disp: 3 capsule, Rfl: 3  gabapentin (NEURONTIN) 300 MG capsule, Take 1 capsule (300 mg total) by mouth 2 (two) times daily., Disp: 90 capsule, Rfl: 0  lansoprazole (PREVACID) 30 MG capsule, Take 30 mg by mouth once daily., Disp: , Rfl:   levothyroxine (SYNTHROID) 100 MCG tablet, Take 1 tablet (100 mcg total) by mouth before breakfast., Disp: 30 tablet, Rfl: 11  losartan (COZAAR) 100 MG tablet, Take 1 tablet by mouth once daily, Disp: 90 tablet, Rfl: 1  methocarbamoL (ROBAXIN) 500 MG Tab, Take 500 mg by mouth 4 (four) times daily., Disp: , Rfl:   polyethylene glycol (GLYCOLAX) 17 gram PwPk, Take 17 g by mouth once daily., Disp: 30 each, Rfl: 0  rosuvastatin (CRESTOR) 20 MG tablet, Take 0.5 tablets (10 mg total) by mouth every evening., Disp: 90 tablet, Rfl: 0  simethicone (MYLICON) 125 MG chewable tablet, Take 125 mg by mouth every 6 (six) hours as needed for Flatulence., Disp: , Rfl:   tiZANidine (ZANAFLEX) 4 MG tablet, Take 4 mg by mouth every 6 (six) hours as needed., Disp: , Rfl:   UNABLE TO FIND, I B guard bid, Disp: , Rfl:   blood-glucose meter kit, True Metrix glucometer check glucose once daily, Disp: 1 each, Rfl: 0  metFORMIN (GLUCOPHAGE-XR) 500 MG ER 24hr tablet, , Disp: , Rfl:   pantoprazole (PROTONIX) 40 MG tablet, Take 1 tablet (40 mg total) by mouth once daily. (Patient not taking: Reported on 6/6/2022), Disp: 30 tablet, Rfl: 2  SHINGRIX, PF, 50 mcg/0.5 mL injection, , Disp: , Rfl:     Current Facility-Administered Medications on File Prior to Visit:  electrolyte-S (ISOLYTE), , Intravenous, Continuous, Nilay Jeffries MD, Last Rate: 10 mL/hr at 09/17/20 0950, New Bag at 09/17/20 1155          Review of Systems   Respiratory: Negative for chest tightness and shortness of breath.    Cardiovascular: Negative for chest pain and palpitations.   Gastrointestinal: Positive for abdominal distention, constipation and nausea. Negative for anal bleeding, blood  in stool, diarrhea and vomiting.   Genitourinary: Positive for vaginal discharge. Negative for decreased urine volume, difficulty urinating, dysuria, frequency and vaginal bleeding.       Objective:      Physical Exam  Vitals and nursing note reviewed. Exam conducted with a chaperone present.   Constitutional:       General: She is not in acute distress.     Appearance: Normal appearance. She is normal weight. She is not ill-appearing, toxic-appearing or diaphoretic.      Comments: Good blood pressure control  Normal pulse with regular rhythm  Normal weight with a BMI of 24.2 she is up 1 lb from her May 5, 2022 visit   Cardiovascular:      Rate and Rhythm: Normal rate and regular rhythm.      Heart sounds: Normal heart sounds.   Pulmonary:      Effort: Pulmonary effort is normal. No respiratory distress.      Breath sounds: Normal breath sounds. No stridor. No wheezing, rhonchi or rales.   Abdominal:      General: Abdomen is flat. Bowel sounds are normal. There is no distension.      Palpations: Abdomen is soft. There is no mass.      Tenderness: There is no abdominal tenderness. There is no guarding or rebound.      Hernia: No hernia is present. There is no hernia in the left inguinal area or right inguinal area.   Genitourinary:     Exam position: Lithotomy position.      Pubic Area: No rash or pubic lice.       Labia:         Right: Lesion (A few small scattered pearly sebaceous cysts) present. No rash, tenderness or injury.         Left: Lesion (A few small scattered pearly sebaceous cyst) present. No rash, tenderness or injury.       Comments: Still with atrophic vaginitis but no erythema, discharge, or lesions other than the small cysts on both right and left labia majora  Lymphadenopathy:      Lower Body: No right inguinal adenopathy. No left inguinal adenopathy.   Neurological:      Mental Status: She is alert.   Psychiatric:         Mood and Affect: Mood normal.         Behavior: Behavior normal.          Thought Content: Thought content normal.         Judgment: Judgment normal.         Assessment:       1. Vaginal discharge    2. Atrophic vaginitis    3. Abdominal distension    4. Gastroesophageal reflux disease, unspecified whether esophagitis present    5. Hiatal hernia    6. Type 2 diabetes mellitus with hyperosmolarity without coma, without long-term current use of insulin    7. Postoperative hypothyroidism    8. Thyroid cancer    9. BMI 24.0-24.9, adult        Plan:       1. Vaginal discharge  No sign of any discharge at this time.  If it recurs she can try using some Monistat cream, over-the-counter    2. Atrophic vaginitis  Continue using the estrogen cream twice weekly    3. Abdominal distension  May be secondary to gastroparesis and slowed GI motility.  Recommend she try using simethicone starting with each meal and then reducing it to morning and evening and possibly morning only if tolerated    4. Gastroesophageal reflux disease, unspecified whether esophagitis present  Continue the Lansoprazol    5. Hiatal hernia  Patient is fearful of having surgery for the hiatal hernia.  Recommend she try losing 5+ lb, avoid eating or drinking anything before bedtime and see if she can control her symptoms conservatively, if not then proceed with the surgery    6. Type 2 diabetes mellitus with hyperosmolarity without coma, without long-term current use of insulin  Lab Results   Component Value Date    HGBA1C 5.8 (H) 04/06/2022         7. Postoperative hypothyroidism  Lab Results   Component Value Date    TSH 0.013 (L) 05/05/2022     Followed by Endocrinology    8. Thyroid cancer  TSH appropriately suppressed    9. BMI 24.0-24.9, adult  Stable weight

## 2022-06-07 ENCOUNTER — TELEPHONE (OUTPATIENT)
Dept: OBSTETRICS AND GYNECOLOGY | Facility: CLINIC | Age: 77
End: 2022-06-07
Payer: MEDICARE

## 2022-06-07 NOTE — TELEPHONE ENCOUNTER
----- Message from Constance Garcia sent at 6/7/2022 10:35 AM CDT -----  Regarding: pt called  Name of Who is Calling: TODD RODRIGUEZ [[id number]]      What is the request in detail: pt is still having stinging and burning and was told by her pcp to use the monistat cream. She would like  to make sure If that is okay to use. Please advise       Can the clinic reply by [facility]: No      What Number to Call Back [facility] not [facility]: [phone]

## 2022-06-07 NOTE — TELEPHONE ENCOUNTER
Spoke with the pt and she states she is using the Estrace vaginal cream as directed twice a week. She states she started experiencing a white vaginal discharge with itching and burning this AM. She would like to know if she can use OTC Monistat as her directed by her PCP. Please advise.

## 2022-06-08 ENCOUNTER — TELEPHONE (OUTPATIENT)
Dept: FAMILY MEDICINE | Facility: CLINIC | Age: 77
End: 2022-06-08
Payer: MEDICARE

## 2022-06-08 ENCOUNTER — TELEPHONE (OUTPATIENT)
Dept: OBSTETRICS AND GYNECOLOGY | Facility: CLINIC | Age: 77
End: 2022-06-08
Payer: MEDICARE

## 2022-06-08 RX ORDER — FLUCONAZOLE 150 MG/1
150 TABLET ORAL DAILY
Qty: 1 TABLET | Refills: 0 | Status: SHIPPED | OUTPATIENT
Start: 2022-06-08 | End: 2022-06-09

## 2022-06-08 NOTE — TELEPHONE ENCOUNTER
Pt c/o of vaginal burning and now has started itching. Doesn't feel as though the Monistat is helping. Appt scheduled for soonest available 6/16 @ 3682. Pt voiced understanding. States she was just seen by Dr Myers, so she is going to call him as well to see if he will treat her.

## 2022-06-08 NOTE — TELEPHONE ENCOUNTER
----- Message from Felecia Michelle sent at 6/8/2022  7:57 AM CDT -----  Name of Who is Calling: TODD RODRIGUEZ [[id number]]        What is the request in detail: pt is still having stinging and burning and was told by her pcp to use the monistat cream. She has been using it since Monday and she is still having burning and now she is having itching also         Can the clinic reply by [facility]: No        What [facility] Back [facility]: [phone]

## 2022-06-08 NOTE — TELEPHONE ENCOUNTER
I sent a Diflucan 150 mg, take one orally to [facility], [place].  She should not take the muscle relaxer Zanaflex for three days after taking the Diflucan.

## 2022-06-22 ENCOUNTER — TELEPHONE (OUTPATIENT)
Dept: ORTHOPEDICS | Facility: CLINIC | Age: 77
End: 2022-06-22
Payer: MEDICARE

## 2022-06-22 NOTE — TELEPHONE ENCOUNTER
----- Message from Marleen Zimmerman MA sent at 6/22/2022  8:35 AM CDT -----  Contact: pt  Either wants pain meds for shoulder or sooner appt   Call back

## 2022-06-24 ENCOUNTER — OFFICE VISIT (OUTPATIENT)
Dept: OTOLARYNGOLOGY | Facility: CLINIC | Age: 77
End: 2022-06-24
Payer: MEDICARE

## 2022-06-24 VITALS
BODY MASS INDEX: 24.41 KG/M2 | WEIGHT: 151.25 LBS | HEART RATE: 66 BPM | TEMPERATURE: 98 F | DIASTOLIC BLOOD PRESSURE: 75 MMHG | SYSTOLIC BLOOD PRESSURE: 151 MMHG

## 2022-06-24 DIAGNOSIS — C73 THYROID CANCER: Primary | Chronic | ICD-10-CM

## 2022-06-24 DIAGNOSIS — C79.9 METASTASIS FROM THYROID CANCER: ICD-10-CM

## 2022-06-24 DIAGNOSIS — M62.9 MUSCULOSKELETAL DISORDER INVOLVING UPPER TRAPEZIUS MUSCLE: ICD-10-CM

## 2022-06-24 DIAGNOSIS — C73 METASTASIS FROM THYROID CANCER: ICD-10-CM

## 2022-06-24 PROCEDURE — 1101F PR PT FALLS ASSESS DOC 0-1 FALLS W/OUT INJ PAST YR: ICD-10-PCS | Mod: CPTII,S$GLB,, | Performed by: OTOLARYNGOLOGY

## 2022-06-24 PROCEDURE — 3078F PR MOST RECENT DIASTOLIC BLOOD PRESSURE < 80 MM HG: ICD-10-PCS | Mod: CPTII,S$GLB,, | Performed by: OTOLARYNGOLOGY

## 2022-06-24 PROCEDURE — 3288F PR FALLS RISK ASSESSMENT DOCUMENTED: ICD-10-PCS | Mod: CPTII,S$GLB,, | Performed by: OTOLARYNGOLOGY

## 2022-06-24 PROCEDURE — 1159F MED LIST DOCD IN RCRD: CPT | Mod: CPTII,S$GLB,, | Performed by: OTOLARYNGOLOGY

## 2022-06-24 PROCEDURE — 3288F FALL RISK ASSESSMENT DOCD: CPT | Mod: CPTII,S$GLB,, | Performed by: OTOLARYNGOLOGY

## 2022-06-24 PROCEDURE — 99499 UNLISTED E&M SERVICE: CPT | Mod: S$GLB,,, | Performed by: OTOLARYNGOLOGY

## 2022-06-24 PROCEDURE — 1126F PR PAIN SEVERITY QUANTIFIED, NO PAIN PRESENT: ICD-10-PCS | Mod: CPTII,S$GLB,, | Performed by: OTOLARYNGOLOGY

## 2022-06-24 PROCEDURE — 1101F PT FALLS ASSESS-DOCD LE1/YR: CPT | Mod: CPTII,S$GLB,, | Performed by: OTOLARYNGOLOGY

## 2022-06-24 PROCEDURE — 3077F PR MOST RECENT SYSTOLIC BLOOD PRESSURE >= 140 MM HG: ICD-10-PCS | Mod: CPTII,S$GLB,, | Performed by: OTOLARYNGOLOGY

## 2022-06-24 PROCEDURE — 3078F DIAST BP <80 MM HG: CPT | Mod: CPTII,S$GLB,, | Performed by: OTOLARYNGOLOGY

## 2022-06-24 PROCEDURE — 99499 RISK ADDL DX/OHS AUDIT: ICD-10-PCS | Mod: S$GLB,,, | Performed by: OTOLARYNGOLOGY

## 2022-06-24 PROCEDURE — 99999 PR PBB SHADOW E&M-EST. PATIENT-LVL III: CPT | Mod: PBBFAC,,, | Performed by: OTOLARYNGOLOGY

## 2022-06-24 PROCEDURE — 3077F SYST BP >= 140 MM HG: CPT | Mod: CPTII,S$GLB,, | Performed by: OTOLARYNGOLOGY

## 2022-06-24 PROCEDURE — 1159F PR MEDICATION LIST DOCUMENTED IN MEDICAL RECORD: ICD-10-PCS | Mod: CPTII,S$GLB,, | Performed by: OTOLARYNGOLOGY

## 2022-06-24 PROCEDURE — 99213 PR OFFICE/OUTPT VISIT, EST, LEVL III, 20-29 MIN: ICD-10-PCS | Mod: S$GLB,,, | Performed by: OTOLARYNGOLOGY

## 2022-06-24 PROCEDURE — 99999 PR PBB SHADOW E&M-EST. PATIENT-LVL III: ICD-10-PCS | Mod: PBBFAC,,, | Performed by: OTOLARYNGOLOGY

## 2022-06-24 PROCEDURE — 1126F AMNT PAIN NOTED NONE PRSNT: CPT | Mod: CPTII,S$GLB,, | Performed by: OTOLARYNGOLOGY

## 2022-06-24 PROCEDURE — 99213 OFFICE O/P EST LOW 20 MIN: CPT | Mod: S$GLB,,, | Performed by: OTOLARYNGOLOGY

## 2022-06-24 NOTE — PROGRESS NOTES
[facility]    Subjective:       Patient ID: Erica Masterson is a 76 y.o. female.    Chief Complaint: Follow-up and c/o throat discomfort, swallowing issue    Follow-up  Associated symptoms include arthralgias, myalgias and neck pain. Pertinent negatives include no abdominal pain, chest pain, chills, congestion, coughing, fatigue, fever, headaches, nausea, numbness, rash, sore throat, vomiting or weakness.     Oncology History:  Oncology History   Thyroid cancer   5/19/2021 Initial Diagnosis    Thyroid cancer     5/19/2021 Surgery    1. Total thyroidectomy (Dr. Amezquita)     8/10/2021 Cancer Staged    Staging form: Thyroid - Differentiated, AJCC 8th Edition  - Clinical stage from 8/10/2021: Stage III (cT4a, cNX, cM0, Age at diagnosis: >= 55 years)     9/13/2021 Surgery    1) Completion total thyroidectomy with bilateral paratracheal and superior mediastinal dissection  2) Left modified neck dissection of levels II-Vb       9/27/2021 Cancer Staged    Staging form: Thyroid - Differentiated, AJCC 8th Edition  - Pathologic stage from 9/27/2021: Stage III (pT4a, pN1b, cM0, Age at diagnosis: >= 55 years)     12/15/2021 -  Radiation Therapy    Treating physician: Dr. Landrum, Dr. Yepez  Total Dose: 100 mCi COOPER         Erica Masterson is a 76 y.o. female with thyroid carcinoma s/p surgery and revision surgery and now COOPER on 12/15. Her post-COOPER scan was clear of metastatic disease, with only expected minimal uptake in the thyroid bed.     She still has a multitude of complaints - she complains of left neck tightness that makes her feel like she is choking at night, left neck numbness in the operative bed, and left shoulder decreased range of motion. Her neck tightness has been a little better of late since she started using a Biofreeze pad at night. She reports that she has slept well the last 2 nights. She continues to do the PT exercises and maintains straight posture, as well as using massage.  Her trapezius remains tight. She is breathing well and swallowing well, but notes that things hang up occasionally. 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 Garcia - her voice is getting better. She works with ortho on the shoulder/trapezius rehab. She continues to work with Dr. Landrum, and she had a normal ultrasound in April. Her Tg remains detectable, but marginally lower than her preop levels. She has more labs and another US in September and sees Dr. Landrum in October.     Past Medical History:   Diagnosis Date    Allergy     Anxiety     Cancer     Cataract     Colon polyp     Degenerative arthritis of knee 04/03/2012    Diverticulosis     Encounter for blood transfusion     GERD (gastroesophageal reflux disease)     History of thyroid cancer 2021    Hyperlipidemia     Hypertension     Multinodular goiter 03/26/2012    Myopathy, unspecified 01/18/2010    Tuberculosis        Past Surgical History:   Procedure Laterality Date    BREAST BIOPSY Left 2015    benign    CHOLECYSTECTOMY      COLONOSCOPY  12/2/15    Dr. Britton, multiple polyps, recheck five years-three years if more than 2 polyps are adenomatous    COLONOSCOPY N/A 12/2/2015    COLONOSCOPY N/A 3/20/2019    Procedure: COLONOSCOPY;  Surgeon: Jose Britton MD;  Location: [facility];  Service: Endoscopy;  Laterality: N/A;    COLONOSCOPY N/A 5/20/2020    Dr. Britton; internal hemorrhoids; diverticulosis; polyps removed; repeat in 3 years    CYSTOSCOPY N/A 8/26/2020    Procedure: CYSTOSCOPY;  Surgeon: Charlotte Walters Jr., MD;  [facility];  Service: Urology;  Laterality: N/A;    DISSECTION OF NECK Left 9/13/2021    Procedure: DISSECTION, NECK;  Surgeon: Ryan Torres MD;  Location: [address];  Service: ENT;  Laterality: Left;    ESOPHAGOGASTRODUODENOSCOPY N/A 5/20/2020    Dr. Britton; small hiatal hernia;  gastritis; 8 gastric polyps removed    ESOPHAGOGASTRODUODENOSCOPY N/A 4/1/2022    Procedure: EGD (ESOPHAGOGASTRODUODENOSCOPY);  Surgeon: Jose Britton MD;  Location: [facility];  Service: Endoscopy;  Laterality: N/A;    FOOT SURGERY      HYSTERECTOMY      TVH/BSO > 20 years    INTRALUMINAL GASTROINTESTINAL TRACT IMAGING VIA CAPSULE N/A 6/4/2020    Procedure: IMAGING PROCEDURE, GI TRACT, INTRALUMINAL, VIA CAPSULE;  Surgeon: Jose Britton MD;  Location: [facility];  Service: Endoscopy;  Laterality: N/A;    KNEE SURGERY  06/06/2013    right knee tear Dr Iverson     LAPAROSCOPIC CHOLECYSTECTOMY N/A 9/17/2020    Procedure: CHOLECYSTECTOMY, LAPAROSCOPIC;  Surgeon: Forrest Veronica MD;  [facility];  Service: General;  Laterality: N/A;    LUNG LOBECTOMY  1966    right middle lobectomy, due to Tb    OOPHORECTOMY      SHOULDER SURGERY      francis     THYROIDECTOMY Bilateral 5/19/2021    Procedure: THYROIDECTOMY;  Surgeon: Jamia Amezquita MD;  [facility];  Service: General;  Laterality: Bilateral;    THYROIDECTOMY Bilateral 9/13/2021    Procedure: THYROIDECTOMY WITH INTRA-OP PTH;  Surgeon: Ryan Torres MD;  [facility];  Service: ENT;  Laterality: Bilateral;  NIM tube  Intraop PTH         Current Outpatient Medications:     amLODIPine (NORVASC) 2.5 MG tablet, Take 1 tablet by mouth once daily, Disp: 90 tablet, Rfl: 0    blood sugar diagnostic (BLOOD GLUCOSE TEST) Strp, True Metrix glucose strips check once daily, Disp: 100 each, Rfl: 3    carboxymethylcellulose sodium (REFRESH OPHT), Apply 1 drop to eye daily as needed., Disp: , Rfl:     conjugated estrogens (PREMARIN) vaginal cream, Place 0.5 g vaginally once daily AND 0.5 g twice a week. (Patient taking differently: Place 0.5 g vaginally once daily AND 0.5 g twice a week. Saturdays and tuesdays), Disp: 30 g, Rfl: 7    doxepin (SINEQUAN) 10 MG capsule, Take 2 capsules (20 mg total) by mouth every evening.,  Disp: , Rfl:     ergocalciferol (ERGOCALCIFEROL) 50,000 unit Cap, Take 1 capsule (50,000 Units total) by mouth every 30 days., Disp: 3 capsule, Rfl: 3    gabapentin (NEURONTIN) 300 MG capsule, Take 1 capsule (300 mg total) by mouth 2 (two) times daily., Disp: 90 capsule, Rfl: 0    lansoprazole (PREVACID) 30 MG capsule, Take 30 mg by mouth once daily., Disp: , Rfl:     levothyroxine (SYNTHROID) 100 MCG tablet, Take 1 tablet (100 mcg total) by mouth before breakfast., Disp: 30 tablet, Rfl: 11    losartan (COZAAR) 100 MG tablet, Take 1 tablet by mouth once daily, Disp: 90 tablet, Rfl: 1    metFORMIN (GLUCOPHAGE-XR) 500 MG ER 24hr tablet, , Disp: , Rfl:     methocarbamoL (ROBAXIN) 500 MG Tab, Take 500 mg by mouth 4 (four) times daily., Disp: , Rfl:     pantoprazole (PROTONIX) 40 MG tablet, Take 1 tablet (40 mg total) by mouth once daily., Disp: 30 tablet, Rfl: 2    polyethylene glycol (GLYCOLAX) 17 gram PwPk, Take 17 g by mouth once daily., Disp: 30 each, Rfl: 0    rosuvastatin (CRESTOR) 20 MG tablet, Take 0.5 tablets (10 mg total) by mouth every evening., Disp: 90 tablet, Rfl: 0    SHINGRIX, PF, 50 mcg/0.5 mL injection, , Disp: , Rfl:     simethicone (MYLICON) 125 MG chewable tablet, Take 125 mg by mouth every 6 (six) hours as needed for Flatulence., Disp: , Rfl:     UNABLE TO FIND, I B guard bid, Disp: , Rfl:     blood-glucose meter kit, True Metrix glucometer check glucose once daily, Disp: 1 each, Rfl: 0    calcium carbonate (TUMS) 200 mg calcium (500 mg) chewable tablet, Chew and swallow 2 tablets (1,000 mg total) by mouth 3 (three) times daily. for 14 days, Disp: 100 tablet, Rfl: 0  No current facility-administered medications for this visit.    Facility-Administered Medications Ordered in Other Visits:     electrolyte-S (ISOLYTE), , Intravenous, Continuous, Nilay Jeffries MD, Last Rate: 10 mL/hr at 09/17/20 0950, New Bag at 09/17/20 1157    Review of patient's allergies indicates:  No Known  Allergies    Social History     Socioeconomic History    Marital status:    Tobacco Use    Smoking status: Never Smoker    Smokeless tobacco: Never Used   Substance and Sexual Activity    Alcohol use: Not Currently     Comment: stopped 2019    Drug use: No    Sexual activity: Not Currently       Family History   Problem Relation Age of Onset    Colon cancer Other     Cancer Mother     Hypertension Mother     Hyperlipidemia Mother     Cancer Brother     Hypertension Father     Emphysema Father     Diabetes Son     Hypertension Son     Alcohol abuse Son     Diabetes Maternal Aunt     Alzheimer's disease Maternal Uncle     Cancer Maternal Grandmother     No Known Problems Daughter     Dementia Sister     Alzheimer's disease Sister     Breast cancer Sister 60    Obesity Paternal Uncle     Prostate cancer Other     Cancer Brother     Melanoma Neg Hx     Psoriasis Neg Hx     Lupus Neg Hx     Eczema Neg Hx     Amblyopia Neg Hx     Blindness Neg Hx     Cataracts Neg Hx     Glaucoma Neg Hx     Macular degeneration Neg Hx     Retinal detachment Neg Hx     Strabismus Neg Hx     Stroke Neg Hx     Thyroid cancer Neg Hx     Colon polyps Neg Hx     Ulcerative colitis Neg Hx     Stomach cancer Neg Hx     Rectal cancer Neg Hx        Review of Systems   Constitutional: Negative for activity change, appetite change, chills, fatigue, fever and unexpected weight change.   HENT: Positive for voice change. Negative for congestion, dental problem, drooling, ear discharge, ear pain, facial swelling, hearing loss, mouth sores, nosebleeds, postnasal drip, rhinorrhea, sinus pressure, sneezing, sore throat, tinnitus and trouble swallowing.    Eyes: Negative for pain and visual disturbance.   Respiratory: Positive for choking. Negative for cough and shortness of breath.    Cardiovascular: Negative for chest pain, palpitations and leg swelling.   Gastrointestinal: Negative for abdominal pain,  constipation (she is taking some OTC meds at times for this), diarrhea, nausea and vomiting.   Endocrine: Negative for cold intolerance and heat intolerance.   Genitourinary: Negative for difficulty urinating, dysuria and hematuria.   Musculoskeletal: Positive for arthralgias, back pain, myalgias, neck pain and neck stiffness. Negative for gait problem.   Skin: Negative for rash and wound.   Allergic/Immunologic: Negative for environmental allergies and immunocompromised state.   Neurological: Negative for dizziness, facial asymmetry, speech difficulty, weakness, light-headedness, numbness and headaches.   Hematological: Negative for adenopathy. Does not bruise/bleed easily.   Psychiatric/Behavioral: Negative for dysphoric mood. The patient is not nervous/anxious.        Objective:     Physical Exam  Vitals reviewed.   Constitutional:       General: She is not in acute distress.     Appearance: She is well-developed.   HENT:      Head: Normocephalic and atraumatic.      Jaw: No trismus.      Salivary Glands: Right salivary gland is not diffusely enlarged or tender. Left salivary gland is not diffusely enlarged or tender.      Comments: Salivary glands - there are no lesions or asymmetric findings in the submandibular or parotid glands     Right Ear: Hearing, tympanic membrane, ear canal and external ear normal.      Left Ear: Hearing, tympanic membrane, ear canal and external ear normal.      Nose: No nasal deformity or rhinorrhea.      Mouth/Throat:      Mouth: No oral lesions.      Tongue: No lesions.      Palate: No mass and lesions.      Pharynx: Uvula midline.   Eyes:      General: Lids are normal.      Extraocular Movements: Extraocular movements intact.      Conjunctiva/sclera:      Right eye: Right conjunctiva is not injected. No chemosis.     Left eye: Left conjunctiva is not injected. No chemosis.  Neck:      Thyroid: No thyroid mass or thyromegaly.      Vascular: No JVD.      Trachea: Phonation normal. No  "tracheal deviation.     Pulmonary:      Effort: Pulmonary effort is normal. No accessory muscle usage or respiratory distress.      Breath sounds: No stridor.   Chest:   Breasts:      Right: No supraclavicular adenopathy.      Left: No supraclavicular adenopathy.       Musculoskeletal:         General: No tenderness.      Cervical back: Full passive range of motion without pain.   Lymphadenopathy:      Cervical: No cervical adenopathy.      Right cervical: No deep or posterior cervical adenopathy.     Left cervical: No deep or posterior cervical adenopathy.      Upper Body:      Right upper body: No supraclavicular adenopathy.      Left upper body: No supraclavicular adenopathy.   Skin:     Findings: No erythema, lesion or rash.      Nails: There is no clubbing.   Neurological:      Mental Status: She is alert and oriented to person, place, and time.      Cranial Nerves: No cranial nerve deficit or facial asymmetry.      Motor: No weakness.      Gait: Gait normal.   Psychiatric:         Speech: Speech normal.         Behavior: Behavior is cooperative.          Last Tg = 8    Assessment & Plan:       Problem List Items Addressed This Visit     Thyroid cancer - Primary (Chronic)     Overall, she is doing ok and improving. She continues to work through the expected side effects of her neck dissection. We discussed lab disease (elevated thyroglobulin) versus structural disease (actually a "thing") to get, and how we will keep an eye on both her labs, US, and likely a one year followup COOPER scan in December. She will continue to do her home exercises, get the interval labs and visits with Dr. Landrum, and return to see me in January after the repeat imaging and trends. Given her bulk of disease, I worry about her right lateral neck if something structural ever arises, but I explained that we would not intervene unless we found something over there because of how hard this recovery has been on her.                " Musculoskeletal disorder involving upper trapezius muscle    Metastasis from thyroid cancer

## 2022-06-24 NOTE — ASSESSMENT & PLAN NOTE
"Overall, she is doing ok and improving. She continues to work through the expected side effects of her neck dissection. We discussed lab disease (elevated thyroglobulin) versus structural disease (actually a "thing") to get, and how we will keep an eye on both her labs, US, and likely a one year followup COOPER scan in December. She will continue to do her home exercises, get the interval labs and visits with Dr. Landrum, and return to see me in January after the repeat imaging and trends. Given her bulk of disease, I worry about her right lateral neck if something structural ever arises, but I explained that we would not intervene unless we found something over there because of how hard this recovery has been on her.       "

## 2022-06-28 ENCOUNTER — TELEPHONE (OUTPATIENT)
Dept: FAMILY MEDICINE | Facility: CLINIC | Age: 77
End: 2022-06-28
Payer: MEDICARE

## 2022-06-28 DIAGNOSIS — I10 PRIMARY HYPERTENSION: Primary | ICD-10-CM

## 2022-06-28 RX ORDER — HYDROCHLOROTHIAZIDE 12.5 MG/1
12.5 TABLET ORAL DAILY
Qty: 30 TABLET | Refills: 11 | Status: SHIPPED | OUTPATIENT
Start: 2022-06-28 | End: 2022-08-01 | Stop reason: SINTOL

## 2022-06-28 NOTE — TELEPHONE ENCOUNTER
Patient advised to continue Losartan. Discontinue Amlodipine, and start HCTZ. Patient verbalized understanding. Please refer to telephone encounter earlier today 6-28-22.

## 2022-06-28 NOTE — TELEPHONE ENCOUNTER
Patient says her dentist sent her to a periodontist who saw her. She says in August she will have a deep cleaning to clear out the infection. He told her he see's this a lot with people who take Amlodipine. She wants Dr. Myers to change the medication.

## 2022-06-28 NOTE — TELEPHONE ENCOUNTER
Hydrochlorothiazide 12.5 mg sent to [facility] at [facility].

## 2022-06-28 NOTE — TELEPHONE ENCOUNTER
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  According to the side effect profile it is less than 1% of users and I have only seen one other person have problems with it before.  We took him off of it but he could not find anything for blood pressure that worked and he could tolerate it and he eventually went back on it.  She was given hydrochlorothiazide in the past but did better with lisinopril and stopped taking it about six years ago.  It would work well with the losartan and can be combined with the losartan into a single pill if it works and if a proper dose can be determined.  I would suggest we start with a low-dose HCTZ 12.5 mg to take the place of the amlodipine and check the blood pressure with a nurse blood pressure check in 2 to 3 weeks.  Do not think of HCTZ as a fluid pill it is a blood pressure medication and has to be taken daily, not just when your ankles are swollen.

## 2022-06-28 NOTE — TELEPHONE ENCOUNTER
"----- Message from Maylin Esqueda sent at 6/28/2022  3:03 PM CDT -----  "Type:  Patient Call Back    Who Called:TODD RODRIGUEZ [[id number]]    What is the reqeust in detail:Pt requesting call back would like to know if she should continue taking losartan (COZAAR) 100 MG tablet with the new prescription. Please advise     Can the clinic reply by [facility]?no    Best Call Back Number:[phone]      Additional Information:            "

## 2022-06-28 NOTE — TELEPHONE ENCOUNTER
----- Message from Hannah Streeter sent at 6/28/2022  8:14 AM CDT -----  Contact: Pt  Type: Needs Medical Advice    Who Called:Pt  Best Call Back Number:[phone]    Additional Information Requesting a call back regarding Pt was calling in regards to there amlodipine medication pt wanted to speak with the nurse in regards to some issues they are having with the medication pt states it is irritating there gums and causing an infection pt stated to please call when available Thank you  Please Advise-Thank you

## 2022-06-28 NOTE — TELEPHONE ENCOUNTER
Call placed to patient for notification. Patient verbalized understanding. Patient is in agreement with starting HCTZ. Preferred pharmacy is [facility] ([facility] location). Nurse blood pressure check scheduled for the date of 7-12-22. Patient agreed to appointment date, time, and location. Will send follow up message to Dr. Myers for notification.

## 2022-07-12 ENCOUNTER — CLINICAL SUPPORT (OUTPATIENT)
Dept: FAMILY MEDICINE | Facility: CLINIC | Age: 77
End: 2022-07-12
Payer: MEDICARE

## 2022-07-12 VITALS — DIASTOLIC BLOOD PRESSURE: 62 MMHG | SYSTOLIC BLOOD PRESSURE: 110 MMHG

## 2022-07-12 DIAGNOSIS — Z01.30 BP CHECK: Primary | ICD-10-CM

## 2022-07-12 PROCEDURE — 99999 PR PBB SHADOW E&M-EST. PATIENT-LVL I: CPT | Mod: PBBFAC,,,

## 2022-07-12 PROCEDURE — 99999 PR PBB SHADOW E&M-EST. PATIENT-LVL I: ICD-10-PCS | Mod: PBBFAC,,,

## 2022-07-12 NOTE — PROGRESS NOTES
Erica JACQUELINE Masterson 76 y.o. female is here today for Blood Pressure check.   History of HTN yes.    Review of patient's allergies indicates:  No Known Allergies  Creatinine   Date Value Ref Range Status   04/28/2022 1.1 0.5 - 1.4 mg/dL Final   06/04/2013 0.9 0.5 - 1.4 mg/dL Final     Sodium   Date Value Ref Range Status   04/28/2022 144 136 - 145 mmol/L Final     Potassium   Date Value Ref Range Status   04/28/2022 4.2 3.5 - 5.1 mmol/L Final   ]  Patient verifies taking blood pressure medications on a regular basis at the same time of the day.     Current Outpatient Medications:     amLODIPine (NORVASC) 2.5 MG tablet, Take 1 tablet by mouth once daily, Disp: 90 tablet, Rfl: 0    carboxymethylcellulose sodium (REFRESH OPHT), Apply 1 drop to eye daily as needed., Disp: , Rfl:     conjugated estrogens (PREMARIN) vaginal cream, Place 0.5 g vaginally once daily AND 0.5 g twice a week. (Patient taking differently: Place 0.5 g vaginally once daily AND 0.5 g twice a week. Saturdays and tuesdays), Disp: 30 g, Rfl: 7    doxepin (SINEQUAN) 25 MG capsule, Take 1 capsule (25 mg total) by mouth every evening., Disp: 90 capsule, Rfl: 1    ergocalciferol (ERGOCALCIFEROL) 50,000 unit Cap, Take 1 capsule (50,000 Units total) by mouth every 30 days., Disp: 3 capsule, Rfl: 3    gabapentin (NEURONTIN) 300 MG capsule, Take 1 capsule (300 mg total) by mouth 2 (two) times daily., Disp: 90 capsule, Rfl: 0    hydroCHLOROthiazide (HYDRODIURIL) 12.5 MG Tab, Take 1 tablet (12.5 mg total) by mouth once daily., Disp: 30 tablet, Rfl: 11    lansoprazole (PREVACID) 30 MG capsule, Take 30 mg by mouth once daily., Disp: , Rfl:     levothyroxine (SYNTHROID) 100 MCG tablet, Take 1 tablet (100 mcg total) by mouth before breakfast., Disp: 30 tablet, Rfl: 11    losartan (COZAAR) 100 MG tablet, Take 1 tablet by mouth once daily, Disp: 90 tablet, Rfl: 1    metFORMIN (GLUCOPHAGE-XR) 500 MG ER 24hr tablet, , Disp: , Rfl:     methocarbamoL (ROBAXIN) 500  MG Tab, Take 500 mg by mouth 4 (four) times daily., Disp: , Rfl:     pantoprazole (PROTONIX) 40 MG tablet, Take 1 tablet (40 mg total) by mouth once daily., Disp: 30 tablet, Rfl: 2    rosuvastatin (CRESTOR) 20 MG tablet, Take 0.5 tablets (10 mg total) by mouth every evening., Disp: 90 tablet, Rfl: 0    blood sugar diagnostic (BLOOD GLUCOSE TEST) Strp, True Metrix glucose strips check once daily, Disp: 100 each, Rfl: 3    blood-glucose meter kit, True Metrix glucometer check glucose once daily, Disp: 1 each, Rfl: 0    calcium carbonate (TUMS) 200 mg calcium (500 mg) chewable tablet, Chew and swallow 2 tablets (1,000 mg total) by mouth 3 (three) times daily. for 14 days, Disp: 100 tablet, Rfl: 0    polyethylene glycol (GLYCOLAX) 17 gram PwPk, Take 17 g by mouth once daily., Disp: 30 each, Rfl: 0    SHINGRIX, PF, 50 mcg/0.5 mL injection, , Disp: , Rfl:     simethicone (MYLICON) 125 MG chewable tablet, Take 125 mg by mouth every 6 (six) hours as needed for Flatulence., Disp: , Rfl:     UNABLE TO FIND, I B guard bid, Disp: , Rfl:   No current facility-administered medications for this visit.    Facility-Administered Medications Ordered in Other Visits:     electrolyte-S (ISOLYTE), , Intravenous, Continuous, Nilay Jeffries MD, Last Rate: 10 mL/hr at 09/17/20 0950, New Bag at 09/17/20 1155    Patient is asymptomatic.     BP: 110/62 ,   .

## 2022-07-13 ENCOUNTER — TELEPHONE (OUTPATIENT)
Dept: OTOLARYNGOLOGY | Facility: CLINIC | Age: 77
End: 2022-07-13
Payer: MEDICARE

## 2022-07-14 ENCOUNTER — OFFICE VISIT (OUTPATIENT)
Dept: ORTHOPEDICS | Facility: CLINIC | Age: 77
End: 2022-07-14
Payer: MEDICARE

## 2022-07-14 VITALS — RESPIRATION RATE: 18 BRPM | BODY MASS INDEX: 24.27 KG/M2 | HEIGHT: 66 IN | WEIGHT: 151 LBS

## 2022-07-14 DIAGNOSIS — S46.812A TRAPEZIUS MUSCLE STRAIN, LEFT, INITIAL ENCOUNTER: Primary | ICD-10-CM

## 2022-07-14 PROCEDURE — 99213 OFFICE O/P EST LOW 20 MIN: CPT | Mod: S$GLB,,, | Performed by: ORTHOPAEDIC SURGERY

## 2022-07-14 PROCEDURE — 1160F PR REVIEW ALL MEDS BY PRESCRIBER/CLIN PHARMACIST DOCUMENTED: ICD-10-PCS | Mod: CPTII,S$GLB,, | Performed by: ORTHOPAEDIC SURGERY

## 2022-07-14 PROCEDURE — 1101F PR PT FALLS ASSESS DOC 0-1 FALLS W/OUT INJ PAST YR: ICD-10-PCS | Mod: CPTII,S$GLB,, | Performed by: ORTHOPAEDIC SURGERY

## 2022-07-14 PROCEDURE — 1159F PR MEDICATION LIST DOCUMENTED IN MEDICAL RECORD: ICD-10-PCS | Mod: CPTII,S$GLB,, | Performed by: ORTHOPAEDIC SURGERY

## 2022-07-14 PROCEDURE — 1160F RVW MEDS BY RX/DR IN RCRD: CPT | Mod: CPTII,S$GLB,, | Performed by: ORTHOPAEDIC SURGERY

## 2022-07-14 PROCEDURE — 3288F FALL RISK ASSESSMENT DOCD: CPT | Mod: CPTII,S$GLB,, | Performed by: ORTHOPAEDIC SURGERY

## 2022-07-14 PROCEDURE — 99999 PR PBB SHADOW E&M-EST. PATIENT-LVL III: ICD-10-PCS | Mod: PBBFAC,,, | Performed by: ORTHOPAEDIC SURGERY

## 2022-07-14 PROCEDURE — 99213 PR OFFICE/OUTPT VISIT, EST, LEVL III, 20-29 MIN: ICD-10-PCS | Mod: S$GLB,,, | Performed by: ORTHOPAEDIC SURGERY

## 2022-07-14 PROCEDURE — 3288F PR FALLS RISK ASSESSMENT DOCUMENTED: ICD-10-PCS | Mod: CPTII,S$GLB,, | Performed by: ORTHOPAEDIC SURGERY

## 2022-07-14 PROCEDURE — 1125F PR PAIN SEVERITY QUANTIFIED, PAIN PRESENT: ICD-10-PCS | Mod: CPTII,S$GLB,, | Performed by: ORTHOPAEDIC SURGERY

## 2022-07-14 PROCEDURE — 1159F MED LIST DOCD IN RCRD: CPT | Mod: CPTII,S$GLB,, | Performed by: ORTHOPAEDIC SURGERY

## 2022-07-14 PROCEDURE — 99999 PR PBB SHADOW E&M-EST. PATIENT-LVL III: CPT | Mod: PBBFAC,,, | Performed by: ORTHOPAEDIC SURGERY

## 2022-07-14 PROCEDURE — 1125F AMNT PAIN NOTED PAIN PRSNT: CPT | Mod: CPTII,S$GLB,, | Performed by: ORTHOPAEDIC SURGERY

## 2022-07-14 PROCEDURE — 1101F PT FALLS ASSESS-DOCD LE1/YR: CPT | Mod: CPTII,S$GLB,, | Performed by: ORTHOPAEDIC SURGERY

## 2022-07-14 NOTE — PROGRESS NOTES
Past Medical History:   Diagnosis Date    Allergy     Anxiety     Cancer     Cataract     Colon polyp     Degenerative arthritis of knee 04/03/2012    Diverticulosis     Encounter for blood transfusion     GERD (gastroesophageal reflux disease)     History of thyroid cancer 2021    Hyperlipidemia     Hypertension     Multinodular goiter 03/26/2012    Myopathy, unspecified 01/18/2010    Tuberculosis        Past Surgical History:   Procedure Laterality Date    BREAST BIOPSY Left 2015    benign    CHOLECYSTECTOMY      COLONOSCOPY  12/2/15    Dr. Britton, multiple polyps, recheck five years-three years if more than 2 polyps are adenomatous    COLONOSCOPY N/A 12/2/2015    COLONOSCOPY N/A 3/20/2019    Procedure: COLONOSCOPY;  Surgeon: Jose Britton MD;  Location: [facility];  Service: Endoscopy;  Laterality: N/A;    COLONOSCOPY N/A 5/20/2020    Dr. Britton; internal hemorrhoids; diverticulosis; polyps removed; repeat in 3 years    CYSTOSCOPY N/A 8/26/2020    Procedure: CYSTOSCOPY;  Surgeon: Charlotte Walters Jr., MD;  Location: [facility] OR;  Service: Urology;  Laterality: N/A;    DISSECTION OF NECK Left 9/13/2021    Procedure: DISSECTION, NECK;  Surgeon: Ryan Torres MD;  Location: [facility] OR [address] [place] [address];  Service: ENT;  Laterality: Left;    ESOPHAGOGASTRODUODENOSCOPY N/A 5/20/2020    Dr. Britton; small hiatal hernia; gastritis; 8 gastric polyps removed    ESOPHAGOGASTRODUODENOSCOPY N/A 4/1/2022    Procedure: EGD (ESOPHAGOGASTRODUODENOSCOPY);  Surgeon: Jose Britton MD;  Location: [facility];  Service: Endoscopy;  Laterality: N/A;    FOOT SURGERY      HYSTERECTOMY      TVH/BSO > 20 years    INTRALUMINAL GASTROINTESTINAL TRACT IMAGING VIA CAPSULE N/A 6/4/2020    Procedure: IMAGING PROCEDURE, GI TRACT, INTRALUMINAL, VIA CAPSULE;  Surgeon: Jose Britton MD;  Location: [facility];  Service: Endoscopy;  Laterality: N/A;    KNEE SURGERY  06/06/2013    right knee tear Dr Iverson     LAPAROSCOPIC  CHOLECYSTECTOMY N/A 9/17/2020    Procedure: CHOLECYSTECTOMY, LAPAROSCOPIC;  Surgeon: Forrest Veronica MD;  Location: [facility] OR;  Service: General;  Laterality: N/A;    LUNG LOBECTOMY  1966    right middle lobectomy, due to Tb    OOPHORECTOMY      SHOULDER SURGERY      francis     THYROIDECTOMY Bilateral 5/19/2021    Procedure: THYROIDECTOMY;  Surgeon: Jamia Amezquita MD;  [facility];  Service: General;  Laterality: Bilateral;    THYROIDECTOMY Bilateral 9/13/2021    Procedure: THYROIDECTOMY WITH INTRA-OP PTH;  Surgeon: Ryan Torres MD;  [facility];  Service: ENT;  Laterality: Bilateral;  NIM tube  Intraop PTH       Current Outpatient Medications   Medication Sig    amLODIPine (NORVASC) 2.5 MG tablet Take 1 tablet by mouth once daily    blood sugar diagnostic (BLOOD GLUCOSE TEST) Strp True Metrix glucose strips check once daily    blood-glucose meter kit True Metrix glucometer check glucose once daily    calcium carbonate (TUMS) 200 mg calcium (500 mg) chewable tablet Chew and swallow 2 tablets (1,000 mg total) by mouth 3 (three) times daily. for 14 days    carboxymethylcellulose sodium (REFRESH OPHT) Apply 1 drop to eye daily as needed.    conjugated estrogens (PREMARIN) vaginal cream Place 0.5 g vaginally once daily AND 0.5 g twice a week. (Patient taking differently: Place 0.5 g vaginally once daily AND 0.5 g twice a week. Saturdays and tuesdays)    doxepin (SINEQUAN) 25 MG capsule Take 1 capsule (25 mg total) by mouth every evening.    ergocalciferol (ERGOCALCIFEROL) 50,000 unit Cap Take 1 capsule (50,000 Units total) by mouth every 30 days.    gabapentin (NEURONTIN) 300 MG capsule Take 1 capsule (300 mg total) by mouth 2 (two) times daily.    hydroCHLOROthiazide (HYDRODIURIL) 12.5 MG Tab Take 1 tablet (12.5 mg total) by mouth once daily.    lansoprazole (PREVACID) 30 MG capsule Take 30 mg by mouth once daily.    levothyroxine (SYNTHROID) 100 MCG tablet Take 1  tablet (100 mcg total) by mouth before breakfast.    losartan (COZAAR) 100 MG tablet Take 1 tablet by mouth once daily    metFORMIN (GLUCOPHAGE-XR) 500 MG ER 24hr tablet     methocarbamoL (ROBAXIN) 500 MG Tab Take 500 mg by mouth 4 (four) times daily.    pantoprazole (PROTONIX) 40 MG tablet Take 1 tablet (40 mg total) by mouth once daily.    polyethylene glycol (GLYCOLAX) 17 gram PwPk Take 17 g by mouth once daily.    rosuvastatin (CRESTOR) 20 MG tablet Take 0.5 tablets (10 mg total) by mouth every evening.    SHINGRIX, PF, 50 mcg/0.5 mL injection     simethicone (MYLICON) 125 MG chewable tablet Take 125 mg by mouth every 6 (six) hours as needed for Flatulence.    UNABLE TO FIND I B guard bid     No current facility-administered medications for this visit.     Facility-Administered Medications Ordered in Other Visits   Medication    electrolyte-S (ISOLYTE)       Review of patient's allergies indicates:   Allergen Reactions    No known drug allergies        Family History   Problem Relation Age of Onset    Colon cancer Other     Cancer Mother     Hypertension Mother     Hyperlipidemia Mother     Cancer Brother     Hypertension Father     Emphysema Father     Diabetes Son     Hypertension Son     Alcohol abuse Son     Diabetes Maternal Aunt     Alzheimer's disease Maternal Uncle     Cancer Maternal Grandmother     No Known Problems Daughter     Dementia Sister     Alzheimer's disease Sister     Breast cancer Sister 60    Obesity Paternal Uncle     Prostate cancer Other     Cancer Brother     Melanoma Neg Hx     Psoriasis Neg Hx     Lupus Neg Hx     Eczema Neg Hx     Amblyopia Neg Hx     Blindness Neg Hx     Cataracts Neg Hx     Glaucoma Neg Hx     Macular degeneration Neg Hx     Retinal detachment Neg Hx     Strabismus Neg Hx     Stroke Neg Hx     Thyroid cancer Neg Hx     Colon polyps Neg Hx     Ulcerative colitis Neg Hx     Stomach cancer Neg Hx     Rectal cancer Neg  Hx        Social History     Socioeconomic History    Marital status:    Tobacco Use    Smoking status: Never Smoker    Smokeless tobacco: Never Used   Substance and Sexual Activity    Alcohol use: Not Currently     Comment: stopped 2019    Drug use: No    Sexual activity: Not Currently       Chief Complaint:   Chief Complaint   Patient presents with    Left Shoulder - Pain       History of present illness: This is a 76 year-old female who is seen for left shoulder pain.  It started after a thyroidectomy on September 13th.  Patient has a lot of pain in the trapezius and neck area.    She finished the physical therapy and is now just doing it on her own.  Continues to have pain of a 7/10 in the trapezius area radiating into her neck at times.    Review of Systems:      Musculoskeletal:  See HPI        Physical Examination:    Vital Signs:    Vitals:    07/14/22 1316   Resp: 18       Body mass index is 24.37 kg/m².    This a well-developed, well nourished patient in no acute distress.  They are alert and oriented and cooperative to examination.  Pt. walks without an antalgic gait.      Examination of the left shoulder shows no rashes or erythema.  There is much improved scapular posture.  Pain and tightness in the trapezius.  There are no masses, ecchymosis, or atrophy. The patient has full range of motion in forward flexion, external rotation, and internal rotation to the mid T-spine. The patient has moderately positive impingement signs. - Wellington's test. - Speeds test. Nontender to palpation over a.c. joint.Passive range of motion: Forward flexion of 180°, external rotation at 90° of 90°, internal rotation of 50°, and external rotation at 0° of 50°. 2+ radial pulse. Intact axillary, radial, median and ulnar sensation. 4 out of 5 resisted forward flexion, external rotation, and negative lift off test.          X-rays: X-rays of the left shoulder is reviewed which show some AC arthritis otherwise  normal-appearing shoulder x-ray    MRI of the left shoulder:Tendinopathy and partial thickness articular surface tearing of the distal supraspinatus tendon.  AC joint arthrosis.  Mild osteoarthritic changes involving the glenohumeral joint space.     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 Irving to discuss possible treatment options including injections such as trigger point or possible Botox.  Follow-up as needed.

## 2022-07-18 ENCOUNTER — TELEPHONE (OUTPATIENT)
Dept: GASTROENTEROLOGY | Facility: CLINIC | Age: 77
End: 2022-07-18
Payer: MEDICARE

## 2022-07-18 NOTE — TELEPHONE ENCOUNTER
Esophagram results reviewed with patient again she states she already has an appointment with Dr. Britton in August and she will discuss this with him.

## 2022-07-18 NOTE — TELEPHONE ENCOUNTER
----- Message from Janny Hoff sent at 7/18/2022  9:28 AM CDT -----  Type:  Test Results    Who Called:  pt  Name of Test (Lab/Mammo/Etc):  Lab  Date of Test:  5/19  Ordering Provider:  Tobi  Where the test was performed:  [facility]  Best Call Back Number:  [phone] ([place]) or [phone]    Additional Information:  please advise--thank you

## 2022-07-19 ENCOUNTER — HOSPITAL ENCOUNTER (EMERGENCY)
Facility: HOSPITAL | Age: 77
Discharge: HOME | End: 2022-07-19
Attending: EMERGENCY MEDICINE
Payer: MEDICARE

## 2022-07-19 ENCOUNTER — TELEPHONE (OUTPATIENT)
Dept: PHYSICAL MEDICINE AND REHAB | Facility: CLINIC | Age: 77
End: 2022-07-19
Payer: MEDICARE

## 2022-07-19 VITALS
HEART RATE: 69 BPM | DIASTOLIC BLOOD PRESSURE: 58 MMHG | OXYGEN SATURATION: 99 % | SYSTOLIC BLOOD PRESSURE: 120 MMHG | HEIGHT: 66 IN | TEMPERATURE: 98 F | BODY MASS INDEX: 24.11 KG/M2 | RESPIRATION RATE: 18 BRPM | WEIGHT: 150 LBS

## 2022-07-19 DIAGNOSIS — G89.29 CHRONIC LEFT SHOULDER PAIN: Primary | ICD-10-CM

## 2022-07-19 DIAGNOSIS — M25.512 CHRONIC LEFT SHOULDER PAIN: Primary | ICD-10-CM

## 2022-07-19 DIAGNOSIS — R53.83 FATIGUE: ICD-10-CM

## 2022-07-19 LAB
ANION GAP SERPL CALC-SCNC: 13 MMOL/L (ref 8–16)
BASOPHILS # BLD AUTO: 0.05 K/UL (ref 0–0.2)
BASOPHILS NFR BLD: 1.1 % (ref 0–1.9)
BUN SERPL-MCNC: 17 MG/DL (ref 8–23)
CALCIUM SERPL-MCNC: 8.8 MG/DL (ref 8.7–10.5)
CHLORIDE SERPL-SCNC: 104 MMOL/L (ref 95–110)
CO2 SERPL-SCNC: 26 MMOL/L (ref 23–29)
CREAT SERPL-MCNC: 1.1 MG/DL (ref 0.5–1.4)
DIFFERENTIAL METHOD: ABNORMAL
EOSINOPHIL # BLD AUTO: 0.2 K/UL (ref 0–0.5)
EOSINOPHIL NFR BLD: 3.4 % (ref 0–8)
ERYTHROCYTE [DISTWIDTH] IN BLOOD BY AUTOMATED COUNT: 12.6 % (ref 11.5–14.5)
EST. GFR  (AFRICAN AMERICAN): 56 ML/MIN/1.73 M^2
EST. GFR  (NON AFRICAN AMERICAN): 48 ML/MIN/1.73 M^2
GLUCOSE SERPL-MCNC: 128 MG/DL (ref 70–110)
HCT VFR BLD AUTO: 36.9 % (ref 37–48.5)
HCV AB SERPL QL IA: NEGATIVE
HGB BLD-MCNC: 12.2 G/DL (ref 12–16)
HIV 1+2 AB+HIV1 P24 AG SERPL QL IA: NEGATIVE
IMM GRANULOCYTES # BLD AUTO: 0.01 K/UL (ref 0–0.04)
IMM GRANULOCYTES NFR BLD AUTO: 0.2 % (ref 0–0.5)
LYMPHOCYTES # BLD AUTO: 1.1 K/UL (ref 1–4.8)
LYMPHOCYTES NFR BLD: 23.8 % (ref 18–48)
MCH RBC QN AUTO: 29.3 PG (ref 27–31)
MCHC RBC AUTO-ENTMCNC: 33.1 G/DL (ref 32–36)
MCV RBC AUTO: 89 FL (ref 82–98)
MONOCYTES # BLD AUTO: 0.4 K/UL (ref 0.3–1)
MONOCYTES NFR BLD: 8 % (ref 4–15)
NEUTROPHILS # BLD AUTO: 3 K/UL (ref 1.8–7.7)
NEUTROPHILS NFR BLD: 63.5 % (ref 38–73)
NRBC BLD-RTO: 0 /100 WBC
PLATELET # BLD AUTO: 253 K/UL (ref 150–450)
PMV BLD AUTO: 9.3 FL (ref 9.2–12.9)
POTASSIUM SERPL-SCNC: 4 MMOL/L (ref 3.5–5.1)
RBC # BLD AUTO: 4.16 M/UL (ref 4–5.4)
SODIUM SERPL-SCNC: 143 MMOL/L (ref 136–145)
TROPONIN I SERPL DL<=0.01 NG/ML-MCNC: <0.006 NG/ML (ref 0–0.03)
WBC # BLD AUTO: 4.75 K/UL (ref 3.9–12.7)

## 2022-07-19 PROCEDURE — 86803 HEPATITIS C AB TEST: CPT | Performed by: EMERGENCY MEDICINE

## 2022-07-19 PROCEDURE — 93010 ELECTROCARDIOGRAM REPORT: CPT | Mod: ,,, | Performed by: INTERNAL MEDICINE

## 2022-07-19 PROCEDURE — 87389 HIV-1 AG W/HIV-1&-2 AB AG IA: CPT | Performed by: EMERGENCY MEDICINE

## 2022-07-19 PROCEDURE — 99284 EMERGENCY DEPT VISIT MOD MDM: CPT | Mod: 25

## 2022-07-19 PROCEDURE — 84484 ASSAY OF TROPONIN QUANT: CPT | Performed by: EMERGENCY MEDICINE

## 2022-07-19 PROCEDURE — 93005 ELECTROCARDIOGRAM TRACING: CPT

## 2022-07-19 PROCEDURE — 93010 EKG 12-LEAD: ICD-10-PCS | Mod: ,,, | Performed by: INTERNAL MEDICINE

## 2022-07-19 PROCEDURE — 85025 COMPLETE CBC W/AUTO DIFF WBC: CPT | Performed by: EMERGENCY MEDICINE

## 2022-07-19 PROCEDURE — 36415 COLL VENOUS BLD VENIPUNCTURE: CPT | Performed by: EMERGENCY MEDICINE

## 2022-07-19 PROCEDURE — 80048 BASIC METABOLIC PNL TOTAL CA: CPT | Performed by: EMERGENCY MEDICINE

## 2022-07-19 NOTE — TELEPHONE ENCOUNTER
----- Message from Radha Shearer sent at 7/19/2022 10:07 AM CDT -----  Regarding: sooner appointment  Contact: patient  Type:  Sooner Appointment Request    Caller is requesting a sooner appointment.  Caller declined first available appointment listed below.  Caller will not accept being placed on the waitlist and is requesting a message be sent to doctor.    Name of Caller:  patien  When is the first available appointment?  08/04/22  Symptoms:  ER visit 07/19/22 dx neck, stomach and shoulder pain  Best Call Back Number:  [phone]  Additional Information:  Patient would like to be seen sooner if possible. Please call patient to advise.Thanks!

## 2022-07-19 NOTE — TELEPHONE ENCOUNTER
Pt advised that she is scheduled sooner than next available. pt verbalized understanding ands confirmed current appt date/time.

## 2022-07-19 NOTE — ED PROVIDER NOTES
Chief complaint:  Fatigue (And left side pain and 'like my bowels want to move')      HPI:  Erica Masterson is a 76 y.o. female with hx DM, CKD, anxiety, anemia, thyroid CA s/p resection presenting with left-sided pressure sensation in the shoulder along with loose bowel movement awakening from sleep.  No blood in the stools.  Patient notes chronic left shoulder problems for which she is seeing Orthopedics with similar occasional crampy pain in the shoulder.  This was brief and now resolved.  She has upcoming Botox injection scheduled for the shoulder through orthopedist.  She denies chest pain, syncope, presyncope, dyspnea, diaphoresis, emesis.  He is asymptomatic at present and clarify triage note has no ongoing weakness or fatigue.  No focal weakness or numbness.  No visual changes or difficulty talking.  No difficulty walking.    ROS: As per HPI and below:  No headache, visual changes, syncope, hemoptysis, cough, congestion, fever, hematochezia, dysuria, urinary frequency, urgency, dysuria, swelling, rashes. The patient/family denies blurry vision, dysphagia, joint swelling, easy bruising.    Review of patient's allergies indicates:  No Known Allergies    Discharge Medication List as of 7/19/2022  4:19 AM      CONTINUE these medications which have NOT CHANGED    Details   amLODIPine (NORVASC) 2.5 MG tablet Take 1 tablet by mouth once daily, Normal      blood sugar diagnostic (BLOOD GLUCOSE TEST) Strp True Metrix glucose strips check once daily, Print      blood-glucose meter kit True Metrix glucometer check glucose once daily, Print      calcium carbonate (TUMS) 200 mg calcium (500 mg) chewable tablet Chew and swallow 2 tablets (1,000 mg total) by mouth 3 (three) times daily. for 14 days, Starting Tue 9/14/2021, Until Mon 6/6/2022, Normal      carboxymethylcellulose sodium (REFRESH OPHT) Apply 1 drop to eye daily as needed., Historical Med      conjugated estrogens (PREMARIN) vaginal cream Multiple  Dosages:Starting Wed 2/23/2022, Until Wed 2/8/2023 at 2359Place 0.5 g vaginally once daily AND 0.5 g twice a week., Normal      doxepin (SINEQUAN) 25 MG capsule Take 1 capsule (25 mg total) by mouth every evening., Starting Wed 7/6/2022, Until Thu 7/6/2023, Normal      ergocalciferol (ERGOCALCIFEROL) 50,000 unit Cap Take 1 capsule (50,000 Units total) by mouth every 30 days., Starting Thu 1/6/2022, Normal      gabapentin (NEURONTIN) 300 MG capsule Take 1 capsule (300 mg total) by mouth 2 (two) times daily., Starting Tue 4/26/2022, Until Wed 4/26/2023, Normal      hydroCHLOROthiazide (HYDRODIURIL) 12.5 MG Tab Take 1 tablet (12.5 mg total) by mouth once daily., Starting Tue 6/28/2022, Until Wed 6/28/2023, Normal      lansoprazole (PREVACID) 30 MG capsule Take 30 mg by mouth once daily., Starting Wed 5/25/2022, Historical Med      levothyroxine (SYNTHROID) 100 MCG tablet Take 1 tablet (100 mcg total) by mouth before breakfast., Starting Thu 3/24/2022, Until Fri 3/24/2023, Normal      losartan (COZAAR) 100 MG tablet Take 1 tablet by mouth once daily, Normal      metFORMIN (GLUCOPHAGE-XR) 500 MG ER 24hr tablet Historical Med      methocarbamoL (ROBAXIN) 500 MG Tab Take 500 mg by mouth 4 (four) times daily., Historical Med      pantoprazole (PROTONIX) 40 MG tablet Take 1 tablet (40 mg total) by mouth once daily., Starting Mon 4/25/2022, Until Sun 7/24/2022, Normal      polyethylene glycol (GLYCOLAX) 17 gram PwPk Take 17 g by mouth once daily., Starting Wed 3/23/2022, Until Thu 3/23/2023, Normal      rosuvastatin (CRESTOR) 20 MG tablet Take 0.5 tablets (10 mg total) by mouth every evening., Starting Tue 4/26/2022, Normal      SHINGRIX, PF, 50 mcg/0.5 mL injection Starting Tue 2/15/2022, Historical Med      simethicone (MYLICON) 125 MG chewable tablet Take 125 mg by mouth every 6 (six) hours as needed for Flatulence., Historical Med      UNABLE TO FIND I B guard bid, Historical Med             PMH:  As per HPI and  below:  Past Medical History:   Diagnosis Date    Allergy     Anxiety     Cancer     Cataract     Colon polyp     Degenerative arthritis of knee 04/03/2012    Diverticulosis     Encounter for blood transfusion     GERD (gastroesophageal reflux disease)     History of thyroid cancer 2021    Hyperlipidemia     Hypertension     Multinodular goiter 03/26/2012    Myopathy, unspecified 01/18/2010    Tuberculosis      Past Surgical History:   Procedure Laterality Date    BREAST BIOPSY Left 2015    benign    CHOLECYSTECTOMY      COLONOSCOPY  12/2/15    Dr. Britton, multiple polyps, recheck five years-three years if more than 2 polyps are adenomatous    COLONOSCOPY N/A 12/2/2015    COLONOSCOPY N/A 3/20/2019    Procedure: COLONOSCOPY;  Surgeon: Jose Britton MD;  Location: [facility];  Service: Endoscopy;  Laterality: N/A;    COLONOSCOPY N/A 5/20/2020    Dr. Britton; internal hemorrhoids; diverticulosis; polyps removed; repeat in 3 years    CYSTOSCOPY N/A 8/26/2020    Procedure: CYSTOSCOPY;  Surgeon: Charlotte Walters Jr., MD;  Location: [facility];  Service: Urology;  Laterality: N/A;    DISSECTION OF NECK Left 9/13/2021    Procedure: DISSECTION, NECK;  Surgeon: Ryan Torres MD;  Location: [facility] OR [address] [place] [address];  Service: ENT;  Laterality: Left;    ESOPHAGOGASTRODUODENOSCOPY N/A 5/20/2020    Dr. Britton; small hiatal hernia; gastritis; 8 gastric polyps removed    ESOPHAGOGASTRODUODENOSCOPY N/A 4/1/2022    Procedure: EGD (ESOPHAGOGASTRODUODENOSCOPY);  Surgeon: Jose Britton MD;  Location: [facility];  Service: Endoscopy;  Laterality: N/A;    FOOT SURGERY      HYSTERECTOMY      TVH/BSO > 20 years    INTRALUMINAL GASTROINTESTINAL TRACT IMAGING VIA CAPSULE N/A 6/4/2020    Procedure: IMAGING PROCEDURE, GI TRACT, INTRALUMINAL, VIA CAPSULE;  Surgeon: Jose Britton MD;  Location: [facility];  Service: Endoscopy;  Laterality: N/A;    KNEE SURGERY  06/06/2013    right knee tear Dr Iverson     LAPAROSCOPIC  CHOLECYSTECTOMY N/A 9/17/2020    Procedure: CHOLECYSTECTOMY, LAPAROSCOPIC;  Surgeon: Forrest Veronica MD;  Location: [facility] OR;  Service: General;  Laterality: N/A;    LUNG LOBECTOMY  1966    right middle lobectomy, due to Tb    OOPHORECTOMY      SHOULDER SURGERY      francis     THYROIDECTOMY Bilateral 5/19/2021    Procedure: THYROIDECTOMY;  Surgeon: Jamia Amezquita MD;  Location: [facility] OR 2ND FLR;  Service: General;  Laterality: Bilateral;    THYROIDECTOMY Bilateral 9/13/2021    Procedure: THYROIDECTOMY WITH INTRA-OP PTH;  Surgeon: Ryan Torres MD;  Location: [facility] OR 2ND FLR;  Service: ENT;  Laterality: Bilateral;  NIM tube  Intraop PTH       Social History     Socioeconomic History    Marital status:    Tobacco Use    Smoking status: Never Smoker    Smokeless tobacco: Never Used   Substance and Sexual Activity    Alcohol use: Not Currently     Comment: stopped 2019    Drug use: No    Sexual activity: Not Currently       Family History   Problem Relation Age of Onset    Colon cancer Other     Cancer Mother     Hypertension Mother     Hyperlipidemia Mother     Cancer Brother     Hypertension Father     Emphysema Father     Diabetes Son     Hypertension Son     Alcohol abuse Son     Diabetes Maternal Aunt     Alzheimer's disease Maternal Uncle     Cancer Maternal Grandmother     No Known Problems Daughter     Dementia Sister     Alzheimer's disease Sister     Breast cancer Sister 60    Obesity Paternal Uncle     Prostate cancer Other     Cancer Brother     Melanoma Neg Hx     Psoriasis Neg Hx     Lupus Neg Hx     Eczema Neg Hx     Amblyopia Neg Hx     Blindness Neg Hx     Cataracts Neg Hx     Glaucoma Neg Hx     Macular degeneration Neg Hx     Retinal detachment Neg Hx     Strabismus Neg Hx     Stroke Neg Hx     Thyroid cancer Neg Hx     Colon polyps Neg Hx     Ulcerative colitis Neg Hx     Stomach cancer Neg Hx     Rectal cancer Neg Hx        Physical Exam:     Vitals:    07/19/22 0358   BP: (!) 120/58   Pulse: 69   Resp:    Temp:      GENERAL:  No apparent distress.  Alert.    HEENT:  Moist mucous membranes.  Normocephalic and atraumatic.    NECK:  No swelling.  Midline trachea. Supple.    CARDIOVASCULAR:  Regular rate and rhythm.  2+ radial pulses.  No murmur.  PULMONARY:  Lungs clear to auscultation bilaterally.  No wheezes, rales, or rhonci.    ABDOMEN:  Non-tender and non-distended.    EXTREMITIES:  Warm and well perfused.  Brisk capillary refill.  No should shoulder tenderness to palpation on the left.  No joint effusion.  Full active range of motion without difficulty in abduction and flexion.  NEUROLOGICAL:  Normal mental status.  Appropriate and conversant.  5/5 strength and sensation.  CN III through XII intact.    SKIN:  No rashes or ecchymoses.    BACK:  Atraumatic.  No CVA tenderness to palpation.      Labs Reviewed   CBC W/ AUTO DIFFERENTIAL - Abnormal; Notable for the following components:       Result Value    Hematocrit 36.9 (*)     All other components within normal limits   BASIC METABOLIC PANEL - Abnormal; Notable for the following components:    Glucose 128 (*)     eGFR if  56 (*)     eGFR if non  48 (*)     All other components within normal limits   TROPONIN I   HIV 1 / 2 ANTIBODY   HEPATITIS C ANTIBODY       Discharge Medication List as of 7/19/2022  4:19 AM      CONTINUE these medications which have NOT CHANGED    Details   amLODIPine (NORVASC) 2.5 MG tablet Take 1 tablet by mouth once daily, Normal      blood sugar diagnostic (BLOOD GLUCOSE TEST) Strp True Metrix glucose strips check once daily, Print      blood-glucose meter kit True Metrix glucometer check glucose once daily, Print      calcium carbonate (TUMS) 200 mg calcium (500 mg) chewable tablet Chew and swallow 2 tablets (1,000 mg total) by mouth 3 (three) times daily. for 14 days, Starting Tue 9/14/2021, Until Mon 6/6/2022, Normal       carboxymethylcellulose sodium (REFRESH OPHT) Apply 1 drop to eye daily as needed., Historical Med      conjugated estrogens (PREMARIN) vaginal cream Multiple Dosages:Starting Wed 2/23/2022, Until Wed 2/8/2023 at 2359Place 0.5 g vaginally once daily AND 0.5 g twice a week., Normal      doxepin (SINEQUAN) 25 MG capsule Take 1 capsule (25 mg total) by mouth every evening., Starting Wed 7/6/2022, Until Thu 7/6/2023, Normal      ergocalciferol (ERGOCALCIFEROL) 50,000 unit Cap Take 1 capsule (50,000 Units total) by mouth every 30 days., Starting Thu 1/6/2022, Normal      gabapentin (NEURONTIN) 300 MG capsule Take 1 capsule (300 mg total) by mouth 2 (two) times daily., Starting Tue 4/26/2022, Until Wed 4/26/2023, Normal      hydroCHLOROthiazide (HYDRODIURIL) 12.5 MG Tab Take 1 tablet (12.5 mg total) by mouth once daily., Starting Tue 6/28/2022, Until Wed 6/28/2023, Normal      lansoprazole (PREVACID) 30 MG capsule Take 30 mg by mouth once daily., Starting Wed 5/25/2022, Historical Med      levothyroxine (SYNTHROID) 100 MCG tablet Take 1 tablet (100 mcg total) by mouth before breakfast., Starting Thu 3/24/2022, Until Fri 3/24/2023, Normal      losartan (COZAAR) 100 MG tablet Take 1 tablet by mouth once daily, Normal      metFORMIN (GLUCOPHAGE-XR) 500 MG ER 24hr tablet Historical Med      methocarbamoL (ROBAXIN) 500 MG Tab Take 500 mg by mouth 4 (four) times daily., Historical Med      pantoprazole (PROTONIX) 40 MG tablet Take 1 tablet (40 mg total) by mouth once daily., Starting Mon 4/25/2022, Until Sun 7/24/2022, Normal      polyethylene glycol (GLYCOLAX) 17 gram PwPk Take 17 g by mouth once daily., Starting Wed 3/23/2022, Until Thu 3/23/2023, Normal      rosuvastatin (CRESTOR) 20 MG tablet Take 0.5 tablets (10 mg total) by mouth every evening., Starting Tue 4/26/2022, Normal      SHINGRIX, PF, 50 mcg/0.5 mL injection Starting Tue 2/15/2022, Historical Med      simethicone (MYLICON) 125 MG chewable tablet Take 125 mg by  mouth every 6 (six) hours as needed for Flatulence., Historical Med      UNABLE TO FIND I B guard bid, Historical Med             Orders Placed This Encounter   Procedures    HIV 1/2 Ag/Ab (4th Gen)    Hepatitis C Antibody    CBC Auto Differential    Basic Metabolic Panel    Troponin I    Cardiac Monitoring - Adult    EKG 12-lead       Imaging Results    None         ED Course as of 07/19/22 0435   Tue Jul 19, 2022   0314 EKG:  Normal sinus rhythm at a rate of 77.  Normal intervals.  Normal axis.  No significant ST or T wave changes suggesting acute ischemia or infarction.  No sign of arrhythmia including no delta waves, Brugada syndrome, or signs of hypertrophic cardiomyopathy. [MR]      ED Course User Index  [MR] Antony Esteban MD       MDM:    76 y.o. female with multiple complaints including loose bowel movement and cramping and shoulder.  There is no ongoing abdominal pain or reproducible tenderness and I have very low suspicion for acute, life-threatening intra-abdominal process such as diverticulitis, abscess, bacterial infectious etiology.  I do not think further abdominal imaging is indicated.  I have low suspicion for acute coronary process such as arrhythmia or ischemia.  I have very low suspicion for life-threatening process in the shoulder such as septic joint.  There is no other focal source of infection identified.  She is asymptomatic and resting comfortably and I think appropriate for discharge home.  I do not think she requires admission for closer monitoring at this point.  I have very low suspicion for central neurologic process such as CVA or TIA.  Outpatient follow-up discussed in detail with detailed return precautions reviewed.    Diagnoses:    1. Left shoulder pain  2. Loose stool     Antony Esteban MD  07/19/22 1017

## 2022-07-19 NOTE — ED TRIAGE NOTES
Erica Masterson is here with weakness and nausea and left side pain and 'like my bowels want to move' that woke her tonight.

## 2022-07-19 NOTE — DISCHARGE INSTRUCTIONS
As we discussed, return to the emergency department for new or worsening symptoms including new chest pain, passing out, or difficulty breathing.

## 2022-07-25 ENCOUNTER — OFFICE VISIT (OUTPATIENT)
Dept: PHYSICAL MEDICINE AND REHAB | Facility: CLINIC | Age: 77
End: 2022-07-25
Payer: MEDICARE

## 2022-07-25 VITALS
HEIGHT: 66 IN | HEART RATE: 79 BPM | SYSTOLIC BLOOD PRESSURE: 133 MMHG | WEIGHT: 150 LBS | BODY MASS INDEX: 24.11 KG/M2 | DIASTOLIC BLOOD PRESSURE: 72 MMHG

## 2022-07-25 DIAGNOSIS — M54.2 CERVICALGIA: ICD-10-CM

## 2022-07-25 DIAGNOSIS — G24.3 CERVICAL DYSTONIA: Primary | ICD-10-CM

## 2022-07-25 DIAGNOSIS — M79.10 MYALGIA: ICD-10-CM

## 2022-07-25 PROCEDURE — 99999 PR PBB SHADOW E&M-EST. PATIENT-LVL III: ICD-10-PCS | Mod: PBBFAC,,, | Performed by: PHYSICAL MEDICINE & REHABILITATION

## 2022-07-25 PROCEDURE — 20553 NJX 1/MLT TRIGGER POINTS 3/>: CPT | Mod: PBBFAC,PN | Performed by: PHYSICAL MEDICINE & REHABILITATION

## 2022-07-25 PROCEDURE — 99213 OFFICE O/P EST LOW 20 MIN: CPT | Mod: PBBFAC,25,PN | Performed by: PHYSICAL MEDICINE & REHABILITATION

## 2022-07-25 PROCEDURE — 99214 PR OFFICE/OUTPT VISIT, EST, LEVL IV, 30-39 MIN: ICD-10-PCS | Mod: 25,S$GLB,, | Performed by: PHYSICAL MEDICINE & REHABILITATION

## 2022-07-25 PROCEDURE — 99499 RISK ADDL DX/OHS AUDIT: ICD-10-PCS | Mod: S$GLB,,, | Performed by: PHYSICAL MEDICINE & REHABILITATION

## 2022-07-25 PROCEDURE — 99214 OFFICE O/P EST MOD 30 MIN: CPT | Mod: 25,S$GLB,, | Performed by: PHYSICAL MEDICINE & REHABILITATION

## 2022-07-25 PROCEDURE — 20553 PR INJECT TRIGGER POINTS, > 3: ICD-10-PCS | Mod: S$GLB,,, | Performed by: PHYSICAL MEDICINE & REHABILITATION

## 2022-07-25 PROCEDURE — 99499 UNLISTED E&M SERVICE: CPT | Mod: S$GLB,,, | Performed by: PHYSICAL MEDICINE & REHABILITATION

## 2022-07-25 PROCEDURE — 20553 NJX 1/MLT TRIGGER POINTS 3/>: CPT | Mod: S$GLB,,, | Performed by: PHYSICAL MEDICINE & REHABILITATION

## 2022-07-25 PROCEDURE — 99999 PR PBB SHADOW E&M-EST. PATIENT-LVL III: CPT | Mod: PBBFAC,,, | Performed by: PHYSICAL MEDICINE & REHABILITATION

## 2022-07-25 RX ORDER — METHYLPREDNISOLONE 4 MG/1
TABLET ORAL
Qty: 21 EACH | Refills: 0 | Status: SHIPPED | OUTPATIENT
Start: 2022-07-25 | End: 2022-08-03 | Stop reason: ALTCHOICE

## 2022-07-25 RX ORDER — TIZANIDINE 4 MG/1
4 TABLET ORAL EVERY 6 HOURS PRN
COMMUNITY
Start: 2022-07-13 | End: 2022-08-03

## 2022-07-25 RX ORDER — BACLOFEN 10 MG/1
10 TABLET ORAL 3 TIMES DAILY
Qty: 90 TABLET | Refills: 6 | Status: SHIPPED | OUTPATIENT
Start: 2022-07-25 | End: 2023-01-25

## 2022-07-25 RX ORDER — LIDOCAINE HYDROCHLORIDE 10 MG/ML
3 INJECTION INFILTRATION; PERINEURAL
Status: COMPLETED | OUTPATIENT
Start: 2022-07-25 | End: 2022-07-25

## 2022-07-25 RX ADMIN — LIDOCAINE HYDROCHLORIDE 3 ML: 10 INJECTION INFILTRATION; PERINEURAL at 03:07

## 2022-07-25 NOTE — PROGRESS NOTES
HPI:  Patient is a 76 y.o. year old female s/p radiation and thyroidectomy due to thyroid cancer over a year ago. Since than she developed severe pain radiating from her neck to her shoulders. Her trapezius muscles get so tight at night it wakes her up. The pain is intense. She has been evaluated by orthopedics and she has gone to physical therapy numerous times with no improvement. She does not have any weakness and does not drop objects. She is not on any muscle relaxers.  Imaging    CLINICAL HISTORY:  Brachial plexopathy, nontraumatic;  Brachial plexus disorders     TECHNIQUE:  Multiplanar multisequence pre and post-contrast enhanced sequences were obtained through the brachial plexus on the left with comparison of the right.     COMPARISON:  None     FINDINGS:  It should be noted that the study is motion degraded and multiple sequences were repeated. On the STIR images, there is subtle relative increased STIR and T2 signal intensity within the left trapezius muscle which has mild decreased volume relative to the right.  Changes of edema and denervation injury cannot be excluded.  There is, however, no mass or other obvious or significant abnormality involving the brachial plexus or supraclavicular fossa.  Fat and soft tissue planes appear preserved.  There is no focal mass or region of signal abnormality.  There is no abnormal enhancement.     The lung apices are clear.  There is no apical lung mass.  The components of the brachial plexus appear normal and symmetric bilaterally without clumping, enlargement, signal or enhancement abnormality.  The supraclavicular fossa is grossly normal.  There is a provided history of prior thyroid surgery.     There is normal flow related signal intensity and enhancement in the vessels at the base of the neck and in the supraclavicular region.     There is multilevel degenerative change in the included lower cervical spine.  There is no acute cord compression or significant  spinal stenosis.  Foraminal stenosis or possible nerve root impingement is not adequately evaluated on this study in could be further evaluated with dedicated cervical spine MRI if warranted.     Impression:     1. There is asymmetric signal in volume involving the left trapezius muscle which may reflect changes of denervation and edema (spinal accessory neuropathy).  The study is motion degraded which slightly limits evaluation.  There is no other mass or primary process involving the brachial plexus.   MRI of the left shoulder without contrast     HISTORY: Shoulder pain and decreased range of motion.     Multiplanar noncontrast imaging is performed.     There is no evidence of fracture or pathologic marrow replacement. There are small degenerative marrow signal changes within the humeral head and about the acromioclavicular joint. There is mild-moderate AC joint arthrosis with resultant encroachment of the subacromial space. There is no significant joint or bursal effusion.     There is mild rotator cuff tendinopathy. Partial-thickness articular surface tearing involves the distal supraspinatus tendon. Remaining cuff tendons appear intact. No full-thickness rotator cuff tear is identified. No discrete labral abnormality is observed. There is mild thinning of the articular cartilage at the glenohumeral joint space. The biceps tendon is intact and appropriately positioned.     IMPRESSION:     Tendinopathy and partial thickness articular surface tearing of the distal supraspinatus tendon.     AC joint arthrosis.     Mild osteoarthritic changes involving the glenohumeral joint space.     Labs  EGFR 56 elev  Cr 1.1  hgba1c 5.8    Past Medical History:   Diagnosis Date    Allergy     Anxiety     Cancer     Cataract     Colon polyp     Degenerative arthritis of knee 04/03/2012    Diverticulosis     Encounter for blood transfusion     GERD (gastroesophageal reflux disease)     History of thyroid cancer 2021     Hyperlipidemia     Hypertension     Multinodular goiter 03/26/2012    Myopathy, unspecified 01/18/2010    Tuberculosis      Past Surgical History:   Procedure Laterality Date    BREAST BIOPSY Left 2015    benign    CHOLECYSTECTOMY      COLONOSCOPY  12/2/15    Dr. Britton, multiple polyps, recheck five years-three years if more than 2 polyps are adenomatous    COLONOSCOPY N/A 12/2/2015    COLONOSCOPY N/A 3/20/2019    Procedure: COLONOSCOPY;  Surgeon: Jose Britton MD;  Location: [facility];  Service: Endoscopy;  Laterality: N/A;    COLONOSCOPY N/A 5/20/2020    Dr. Britton; internal hemorrhoids; diverticulosis; polyps removed; repeat in 3 years    CYSTOSCOPY N/A 8/26/2020    Procedure: CYSTOSCOPY;  Surgeon: Charlotte Walters Jr., MD;  Location: [facility];  Service: Urology;  Laterality: N/A;    DISSECTION OF NECK Left 9/13/2021    Procedure: DISSECTION, NECK;  Surgeon: Ryan Torres MD;  Location: [address];  Service: ENT;  Laterality: Left;    ESOPHAGOGASTRODUODENOSCOPY N/A 5/20/2020    Dr. Britton; small hiatal hernia; gastritis; 8 gastric polyps removed    ESOPHAGOGASTRODUODENOSCOPY N/A 4/1/2022    Procedure: EGD (ESOPHAGOGASTRODUODENOSCOPY);  Surgeon: Jose Britton MD;  Location: [facility];  Service: Endoscopy;  Laterality: N/A;    FOOT SURGERY      HYSTERECTOMY      TVH/BSO > 20 years    INTRALUMINAL GASTROINTESTINAL TRACT IMAGING VIA CAPSULE N/A 6/4/2020    Procedure: IMAGING PROCEDURE, GI TRACT, INTRALUMINAL, VIA CAPSULE;  Surgeon: Jose Britton MD;  Location: [facility];  Service: Endoscopy;  Laterality: N/A;    KNEE SURGERY  06/06/2013    right knee tear Dr Iverson     LAPAROSCOPIC CHOLECYSTECTOMY N/A 9/17/2020    Procedure: CHOLECYSTECTOMY, LAPAROSCOPIC;  Surgeon: Forrest Veronica MD;  Location: [facility];  Service: General;  Laterality: N/A;    LUNG LOBECTOMY  1966    right middle lobectomy, due to Tb    OOPHORECTOMY      SHOULDER SURGERY      francis     THYROIDECTOMY  Bilateral 5/19/2021    Procedure: THYROIDECTOMY;  Surgeon: Jamia Amezquita MD;  Location: [facility] OR 2ND FLR;  Service: General;  Laterality: Bilateral;    THYROIDECTOMY Bilateral 9/13/2021    Procedure: THYROIDECTOMY WITH INTRA-OP PTH;  Surgeon: Ryan Torres MD;  Location: [facility] OR 2ND FLR;  Service: ENT;  Laterality: Bilateral;  NIM tube  Intraop PTH     Family History   Problem Relation Age of Onset    Colon cancer Other     Cancer Mother     Hypertension Mother     Hyperlipidemia Mother     Cancer Brother     Hypertension Father     Emphysema Father     Diabetes Son     Hypertension Son     Alcohol abuse Son     Diabetes Maternal Aunt     Alzheimer's disease Maternal Uncle     Cancer Maternal Grandmother     No Known Problems Daughter     Dementia Sister     Alzheimer's disease Sister     Breast cancer Sister 60    Obesity Paternal Uncle     Prostate cancer Other     Cancer Brother     Melanoma Neg Hx     Psoriasis Neg Hx     Lupus Neg Hx     Eczema Neg Hx     Amblyopia Neg Hx     Blindness Neg Hx     Cataracts Neg Hx     Glaucoma Neg Hx     Macular degeneration Neg Hx     Retinal detachment Neg Hx     Strabismus Neg Hx     Stroke Neg Hx     Thyroid cancer Neg Hx     Colon polyps Neg Hx     Ulcerative colitis Neg Hx     Stomach cancer Neg Hx     Rectal cancer Neg Hx      Social History     Socioeconomic History    Marital status:    Tobacco Use    Smoking status: Never Smoker    Smokeless tobacco: Never Used   Substance and Sexual Activity    Alcohol use: Not Currently     Comment: stopped 2019    Drug use: No    Sexual activity: Not Currently       Review of patient's allergies indicates:  No Known Allergies    Current Outpatient Medications:     amLODIPine (NORVASC) 2.5 MG tablet, Take 1 tablet by mouth once daily, Disp: 90 tablet, Rfl: 0    blood sugar diagnostic (BLOOD GLUCOSE TEST) Strp, True Metrix glucose strips check once daily, Disp: 100 each,  Rfl: 3    carboxymethylcellulose sodium (REFRESH OPHT), Apply 1 drop to eye daily as needed., Disp: , Rfl:     conjugated estrogens (PREMARIN) vaginal cream, Place 0.5 g vaginally once daily AND 0.5 g twice a week. (Patient taking differently: Place 0.5 g vaginally once daily AND 0.5 g twice a week. Saturdays and tuesdays), Disp: 30 g, Rfl: 7    doxepin (SINEQUAN) 25 MG capsule, Take 1 capsule (25 mg total) by mouth every evening., Disp: 90 capsule, Rfl: 1    ergocalciferol (ERGOCALCIFEROL) 50,000 unit Cap, Take 1 capsule (50,000 Units total) by mouth every 30 days., Disp: 3 capsule, Rfl: 3    gabapentin (NEURONTIN) 300 MG capsule, Take 1 capsule (300 mg total) by mouth 2 (two) times daily., Disp: 90 capsule, Rfl: 0    hydroCHLOROthiazide (HYDRODIURIL) 12.5 MG Tab, Take 1 tablet (12.5 mg total) by mouth once daily., Disp: 30 tablet, Rfl: 11    lansoprazole (PREVACID) 30 MG capsule, Take 30 mg by mouth once daily., Disp: , Rfl:     levothyroxine (SYNTHROID) 100 MCG tablet, Take 1 tablet (100 mcg total) by mouth before breakfast., Disp: 30 tablet, Rfl: 11    losartan (COZAAR) 100 MG tablet, Take 1 tablet by mouth once daily, Disp: 90 tablet, Rfl: 1    metFORMIN (GLUCOPHAGE-XR) 500 MG ER 24hr tablet, , Disp: , Rfl:     methocarbamoL (ROBAXIN) 500 MG Tab, Take 500 mg by mouth 4 (four) times daily., Disp: , Rfl:     polyethylene glycol (GLYCOLAX) 17 gram PwPk, Take 17 g by mouth once daily., Disp: 30 each, Rfl: 0    rosuvastatin (CRESTOR) 20 MG tablet, Take 0.5 tablets (10 mg total) by mouth every evening., Disp: 90 tablet, Rfl: 0    SHINGRIX, PF, 50 mcg/0.5 mL injection, , Disp: , Rfl:     simethicone (MYLICON) 125 MG chewable tablet, Take 125 mg by mouth every 6 (six) hours as needed for Flatulence., Disp: , Rfl:     tiZANidine (ZANAFLEX) 4 MG tablet, Take 4 mg by mouth every 6 (six) hours as needed., Disp: , Rfl:     UNABLE TO FIND, I B guard bid, Disp: , Rfl:     blood-glucose meter kit, True Metrix  "glucometer check glucose once daily, Disp: 1 each, Rfl: 0    calcium carbonate (TUMS) 200 mg calcium (500 mg) chewable tablet, Chew and swallow 2 tablets (1,000 mg total) by mouth 3 (three) times daily. for 14 days, Disp: 100 tablet, Rfl: 0    pantoprazole (PROTONIX) 40 MG tablet, Take 1 tablet (40 mg total) by mouth once daily., Disp: 30 tablet, Rfl: 2  No current facility-administered medications for this visit.    Facility-Administered Medications Ordered in Other Visits:     electrolyte-S (ISOLYTE), , Intravenous, Continuous, Nilay Jeffries MD, Last Rate: 10 mL/hr at 09/17/20 0950, New Bag at 09/17/20 1155      Review of Systems:    No nausea, vomiting, fevers, chills , contipation, diarrhea or sweats,no weight change,  neck stiffness, no chest pain, no sob, no change of bowel or bladder habits,no coordination issues      Physical Exam:      Vitals:    07/25/22 1333   BP: 133/72   Pulse: 79     alert and oriented ×4 follows commands answers all questions appropriately,affect wnl  Manual muscle test 5 out of 5 sensation to light touch grossly intact  + tenderness cervical paraspinals  Inc tone of trapezius, hypertrophy of trapezius b/l  No muscular atrophy  Dec cervical rom  No head bobbing  No tremors  Nl gait  -spurling's  -lopez's  No C/C/E      Assessment:  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 Right 20u  Splenius cap left 20 u  Total: 200 units    She will also do a baclofen trial  Stop robaxin    PROCEDURE NOTE:Risk and benefit of trigger point injections given to pt. Injections performed w. A" 25G needle after sterile prep w. chlorohexedine, verbal consent obtained . NO complications. b/l trapezius, " splenius cap and lev scapulae were injected with a total of 3ML of lidocaine 1% to each side      Thank you for this interesting referral-note will be sent via Epic to referring provider (Dr. Myers)

## 2022-07-26 ENCOUNTER — TELEPHONE (OUTPATIENT)
Dept: GASTROENTEROLOGY | Facility: CLINIC | Age: 77
End: 2022-07-26
Payer: MEDICARE

## 2022-07-26 ENCOUNTER — TELEPHONE (OUTPATIENT)
Dept: PHYSICAL MEDICINE AND REHAB | Facility: CLINIC | Age: 77
End: 2022-07-26
Payer: MEDICARE

## 2022-07-26 NOTE — TELEPHONE ENCOUNTER
----- Message from Rei Painter sent at 7/26/2022  7:15 AM CDT -----  Type: Needs Medical Advice  Who Called:   Pt    Best Call Back Number: [phone]     Additional Information: Pt sts she got the injection yesterday and forgot to mention she has a Hernia and last night it started to burn her and she had to take a tablet.  She wants someone to call her.  Please advise -- Thank you

## 2022-07-26 NOTE — TELEPHONE ENCOUNTER
----- Message from Rei Painter sent at 7/26/2022  7:17 AM CDT -----  Type: Needs Medical Advice  Who Called:  Pt    Best Call Back Number: [phone] (    Additional Information: Sts she had the injection in her neck yesterday, and has a Hernia and she took one of the tablets and when she wipes after a bowel movement she has a little blood.  Also wants to know if there is something she can get to coat her stomach.  Please advise -- Thank you

## 2022-07-29 ENCOUNTER — TELEPHONE (OUTPATIENT)
Dept: PHYSICAL MEDICINE AND REHAB | Facility: CLINIC | Age: 77
End: 2022-07-29
Payer: MEDICARE

## 2022-07-29 NOTE — TELEPHONE ENCOUNTER
Returned call to patient who has c/o tightness in neck, advised to follow plan with baclofen and steroids and to try ice or heat which ever gives most relief. Advised if  pain becomes intolerable to seek care at urgent care or ED. Verbalizes understanding.

## 2022-07-29 NOTE — TELEPHONE ENCOUNTER
----- Message from Batsheva Verma sent at 7/29/2022 12:23 PM CDT -----  Who Called: Patient    What is the reqeust in detail: Requesting call back to discuss how patient is not feeling well at all. Please advise.     Can the clinic reply by [facility]? No    Best Call Back Number: [phone]    Additional Information:

## 2022-08-01 ENCOUNTER — TELEPHONE (OUTPATIENT)
Dept: FAMILY MEDICINE | Facility: CLINIC | Age: 77
End: 2022-08-01
Payer: MEDICARE

## 2022-08-01 ENCOUNTER — LAB VISIT (OUTPATIENT)
Dept: LAB | Facility: HOSPITAL | Age: 77
End: 2022-08-01
Attending: INTERNAL MEDICINE
Payer: MEDICARE

## 2022-08-01 DIAGNOSIS — C73 MALIGNANT NEOPLASM OF THYROID GLAND: ICD-10-CM

## 2022-08-01 DIAGNOSIS — E89.0 POST-SURGICAL HYPOTHYROIDISM: ICD-10-CM

## 2022-08-01 LAB
T4 FREE SERPL-MCNC: 1.57 NG/DL (ref 0.71–1.51)
TSH SERPL DL<=0.005 MIU/L-ACNC: <0.01 UIU/ML (ref 0.4–4)

## 2022-08-01 PROCEDURE — 84443 ASSAY THYROID STIM HORMONE: CPT | Performed by: INTERNAL MEDICINE

## 2022-08-01 PROCEDURE — 36415 COLL VENOUS BLD VENIPUNCTURE: CPT | Mod: PO | Performed by: INTERNAL MEDICINE

## 2022-08-01 PROCEDURE — 84439 ASSAY OF FREE THYROXINE: CPT | Performed by: INTERNAL MEDICINE

## 2022-08-01 NOTE — TELEPHONE ENCOUNTER
Okay with me to resume the amlodipine and stop HCTZ.  Dr. Robles had given her the amlodipine originally.  Other alternatives, nifedipine and Felodipine, have the same potential side effect and would offer no advantage.

## 2022-08-01 NOTE — TELEPHONE ENCOUNTER
----- Message from Tim Mike sent at 8/1/2022  9:17 AM CDT -----  Type: Needs Medical Advice  Who Called:  Patient    Pharmacy name and phone #:   [id number] - [address] [facility] [facility] [address]  [place] [address]  Phone: [phone] Fax: [phone]      Best Call Back Number: [phone]  Additional Information: Patient states that she would like a callback regarding changing her BP medications.

## 2022-08-01 NOTE — TELEPHONE ENCOUNTER
Patient states she will stop HCTZ; and start Amlodipine today. Will send follow up message to Dr. Myers for notification.

## 2022-08-01 NOTE — TELEPHONE ENCOUNTER
Patient called on 6/28 -her dentist told her the Amlodipine is causing gingivitis- she said since changing to HCTZ her mouth has been dry and urinating a lot- she wants to get back on the Amlodipine and stop the HCTZ- she says her gums have always bled. She still has Amlodipine at home.

## 2022-08-02 ENCOUNTER — TELEPHONE (OUTPATIENT)
Dept: FAMILY MEDICINE | Facility: CLINIC | Age: 77
End: 2022-08-02
Payer: MEDICARE

## 2022-08-02 ENCOUNTER — TELEPHONE (OUTPATIENT)
Dept: ENDOCRINOLOGY | Facility: CLINIC | Age: 77
End: 2022-08-02
Payer: MEDICARE

## 2022-08-02 DIAGNOSIS — E89.0 POST-SURGICAL HYPOTHYROIDISM: Primary | ICD-10-CM

## 2022-08-02 DIAGNOSIS — C73 MALIGNANT NEOPLASM OF THYROID GLAND: ICD-10-CM

## 2022-08-02 DIAGNOSIS — M85.88 OSTEOPENIA OF LUMBAR SPINE: ICD-10-CM

## 2022-08-02 RX ORDER — LEVOTHYROXINE SODIUM 88 UG/1
88 TABLET ORAL
Qty: 30 TABLET | Refills: 11 | Status: ON HOLD | OUTPATIENT
Start: 2022-08-02 | End: 2022-08-17 | Stop reason: HOSPADM

## 2022-08-02 NOTE — TELEPHONE ENCOUNTER
----- Message from Juan M Landrum MD sent at 8/2/2022  1:53 PM CDT -----  Labs reviewed. Please call patient and let her know (she said she doesn't use the portal very often):   1. Thyroid function is abnormal. TSH low, free t4 high suggesting the dose is too strong, so need to decrease the dose:      Decrease thyroid medication to 88 mcg/day. [facility].   2. Repeat labs before her next appointment.     Thanks,   - Juan M

## 2022-08-02 NOTE — TELEPHONE ENCOUNTER
Appointment scheduled for tomorrow 8-3-22. Patient agreed to appointment date, time, and location.

## 2022-08-02 NOTE — TELEPHONE ENCOUNTER
----- Message from Dinorah Cervantes, Patient Care Assistant sent at 8/2/2022 11:31 AM CDT -----  Regarding: appointment  Contact: pt  Type:  Sooner Appointment Request    Caller is requesting a sooner appointment.  Caller declined first available appointment listed below.  Caller will not accept being placed on the waitlist and is requesting a message be sent to doctor.    Name of Caller:  pt   When is the first available appointment?  2022  Symptoms:  hair loss, body pain, dehyrated  Best Call Back Number:  [phone]  Additional Information:  please call pt to advise. Thanks!

## 2022-08-02 NOTE — TELEPHONE ENCOUNTER
Contacted patient with her daughter-in-law on the line to inform them of the patients lab results.Patient and daughter-in-law verbalized understanding.

## 2022-08-03 ENCOUNTER — TELEPHONE (OUTPATIENT)
Dept: PHYSICAL MEDICINE AND REHAB | Facility: CLINIC | Age: 77
End: 2022-08-03
Payer: MEDICARE

## 2022-08-03 ENCOUNTER — OFFICE VISIT (OUTPATIENT)
Dept: GASTROENTEROLOGY | Facility: CLINIC | Age: 77
End: 2022-08-03
Payer: MEDICARE

## 2022-08-03 ENCOUNTER — OFFICE VISIT (OUTPATIENT)
Dept: FAMILY MEDICINE | Facility: CLINIC | Age: 77
End: 2022-08-03
Payer: MEDICARE

## 2022-08-03 ENCOUNTER — TELEPHONE (OUTPATIENT)
Dept: FAMILY MEDICINE | Facility: CLINIC | Age: 77
End: 2022-08-03
Payer: MEDICARE

## 2022-08-03 VITALS
HEART RATE: 78 BPM | DIASTOLIC BLOOD PRESSURE: 60 MMHG | WEIGHT: 147.06 LBS | SYSTOLIC BLOOD PRESSURE: 136 MMHG | BODY MASS INDEX: 23.64 KG/M2 | TEMPERATURE: 99 F | HEIGHT: 66 IN | OXYGEN SATURATION: 98 %

## 2022-08-03 VITALS
WEIGHT: 147.25 LBS | BODY MASS INDEX: 23.95 KG/M2 | SYSTOLIC BLOOD PRESSURE: 129 MMHG | DIASTOLIC BLOOD PRESSURE: 59 MMHG

## 2022-08-03 DIAGNOSIS — M62.838 NIGHT MUSCLE SPASMS: ICD-10-CM

## 2022-08-03 DIAGNOSIS — Z86.010 HISTORY OF COLON POLYPS: ICD-10-CM

## 2022-08-03 DIAGNOSIS — G72.9 MYOPATHY, UNSPECIFIED: ICD-10-CM

## 2022-08-03 DIAGNOSIS — G47.9 SLEEP DISORDER: ICD-10-CM

## 2022-08-03 DIAGNOSIS — R14.0 ABDOMINAL BLOATING: ICD-10-CM

## 2022-08-03 DIAGNOSIS — F41.9 ANXIETY: ICD-10-CM

## 2022-08-03 DIAGNOSIS — B37.0 THRUSH: Primary | ICD-10-CM

## 2022-08-03 DIAGNOSIS — F41.1 GAD (GENERALIZED ANXIETY DISORDER): Primary | Chronic | ICD-10-CM

## 2022-08-03 DIAGNOSIS — R06.02 SOB (SHORTNESS OF BREATH): ICD-10-CM

## 2022-08-03 DIAGNOSIS — E11.42 DIABETIC POLYNEUROPATHY ASSOCIATED WITH TYPE 2 DIABETES MELLITUS: ICD-10-CM

## 2022-08-03 DIAGNOSIS — N18.31 STAGE 3A CHRONIC KIDNEY DISEASE: Chronic | ICD-10-CM

## 2022-08-03 PROBLEM — R31.9 URINARY TRACT INFECTION WITH HEMATURIA: Status: RESOLVED | Noted: 2020-08-26 | Resolved: 2022-08-03

## 2022-08-03 PROBLEM — N39.0 URINARY TRACT INFECTION WITH HEMATURIA: Status: RESOLVED | Noted: 2020-08-26 | Resolved: 2022-08-03

## 2022-08-03 PROBLEM — M62.9 MUSCULOSKELETAL DISORDER INVOLVING UPPER TRAPEZIUS MUSCLE: Status: RESOLVED | Noted: 2021-11-18 | Resolved: 2022-08-03

## 2022-08-03 PROBLEM — D64.9 ANEMIA: Status: RESOLVED | Noted: 2020-05-12 | Resolved: 2022-08-03

## 2022-08-03 PROCEDURE — 99999 PR PBB SHADOW E&M-EST. PATIENT-LVL III: ICD-10-PCS | Mod: PBBFAC,,, | Performed by: INTERNAL MEDICINE

## 2022-08-03 PROCEDURE — 1159F MED LIST DOCD IN RCRD: CPT | Mod: CPTII,S$GLB,, | Performed by: INTERNAL MEDICINE

## 2022-08-03 PROCEDURE — 99214 OFFICE O/P EST MOD 30 MIN: CPT | Mod: S$GLB,,, | Performed by: PHYSICIAN ASSISTANT

## 2022-08-03 PROCEDURE — 99214 PR OFFICE/OUTPT VISIT, EST, LEVL IV, 30-39 MIN: ICD-10-PCS | Mod: S$GLB,,, | Performed by: INTERNAL MEDICINE

## 2022-08-03 PROCEDURE — 1101F PT FALLS ASSESS-DOCD LE1/YR: CPT | Mod: CPTII,S$GLB,, | Performed by: PHYSICIAN ASSISTANT

## 2022-08-03 PROCEDURE — 3288F PR FALLS RISK ASSESSMENT DOCUMENTED: ICD-10-PCS | Mod: CPTII,S$GLB,, | Performed by: INTERNAL MEDICINE

## 2022-08-03 PROCEDURE — 99999 PR PBB SHADOW E&M-EST. PATIENT-LVL III: CPT | Mod: PBBFAC,,, | Performed by: PHYSICIAN ASSISTANT

## 2022-08-03 PROCEDURE — 3078F PR MOST RECENT DIASTOLIC BLOOD PRESSURE < 80 MM HG: ICD-10-PCS | Mod: CPTII,S$GLB,, | Performed by: INTERNAL MEDICINE

## 2022-08-03 PROCEDURE — 1159F MED LIST DOCD IN RCRD: CPT | Mod: CPTII,S$GLB,, | Performed by: PHYSICIAN ASSISTANT

## 2022-08-03 PROCEDURE — 3078F DIAST BP <80 MM HG: CPT | Mod: CPTII,S$GLB,, | Performed by: PHYSICIAN ASSISTANT

## 2022-08-03 PROCEDURE — 3075F SYST BP GE 130 - 139MM HG: CPT | Mod: CPTII,S$GLB,, | Performed by: PHYSICIAN ASSISTANT

## 2022-08-03 PROCEDURE — 1101F PT FALLS ASSESS-DOCD LE1/YR: CPT | Mod: CPTII,S$GLB,, | Performed by: INTERNAL MEDICINE

## 2022-08-03 PROCEDURE — 1159F PR MEDICATION LIST DOCUMENTED IN MEDICAL RECORD: ICD-10-PCS | Mod: CPTII,S$GLB,, | Performed by: INTERNAL MEDICINE

## 2022-08-03 PROCEDURE — 3074F PR MOST RECENT SYSTOLIC BLOOD PRESSURE < 130 MM HG: ICD-10-PCS | Mod: CPTII,S$GLB,, | Performed by: INTERNAL MEDICINE

## 2022-08-03 PROCEDURE — 3288F FALL RISK ASSESSMENT DOCD: CPT | Mod: CPTII,S$GLB,, | Performed by: INTERNAL MEDICINE

## 2022-08-03 PROCEDURE — 99999 PR PBB SHADOW E&M-EST. PATIENT-LVL III: CPT | Mod: PBBFAC,,, | Performed by: INTERNAL MEDICINE

## 2022-08-03 PROCEDURE — 1159F PR MEDICATION LIST DOCUMENTED IN MEDICAL RECORD: ICD-10-PCS | Mod: CPTII,S$GLB,, | Performed by: PHYSICIAN ASSISTANT

## 2022-08-03 PROCEDURE — 1126F AMNT PAIN NOTED NONE PRSNT: CPT | Mod: CPTII,S$GLB,, | Performed by: PHYSICIAN ASSISTANT

## 2022-08-03 PROCEDURE — 99999 PR PBB SHADOW E&M-EST. PATIENT-LVL III: ICD-10-PCS | Mod: PBBFAC,,, | Performed by: PHYSICIAN ASSISTANT

## 2022-08-03 PROCEDURE — 3074F SYST BP LT 130 MM HG: CPT | Mod: CPTII,S$GLB,, | Performed by: INTERNAL MEDICINE

## 2022-08-03 PROCEDURE — 3288F FALL RISK ASSESSMENT DOCD: CPT | Mod: CPTII,S$GLB,, | Performed by: PHYSICIAN ASSISTANT

## 2022-08-03 PROCEDURE — 3288F PR FALLS RISK ASSESSMENT DOCUMENTED: ICD-10-PCS | Mod: CPTII,S$GLB,, | Performed by: PHYSICIAN ASSISTANT

## 2022-08-03 PROCEDURE — 3075F PR MOST RECENT SYSTOLIC BLOOD PRESS GE 130-139MM HG: ICD-10-PCS | Mod: CPTII,S$GLB,, | Performed by: PHYSICIAN ASSISTANT

## 2022-08-03 PROCEDURE — 3078F PR MOST RECENT DIASTOLIC BLOOD PRESSURE < 80 MM HG: ICD-10-PCS | Mod: CPTII,S$GLB,, | Performed by: PHYSICIAN ASSISTANT

## 2022-08-03 PROCEDURE — 99214 PR OFFICE/OUTPT VISIT, EST, LEVL IV, 30-39 MIN: ICD-10-PCS | Mod: S$GLB,,, | Performed by: PHYSICIAN ASSISTANT

## 2022-08-03 PROCEDURE — 1101F PR PT FALLS ASSESS DOC 0-1 FALLS W/OUT INJ PAST YR: ICD-10-PCS | Mod: CPTII,S$GLB,, | Performed by: PHYSICIAN ASSISTANT

## 2022-08-03 PROCEDURE — 99214 OFFICE O/P EST MOD 30 MIN: CPT | Mod: S$GLB,,, | Performed by: INTERNAL MEDICINE

## 2022-08-03 PROCEDURE — 1125F AMNT PAIN NOTED PAIN PRSNT: CPT | Mod: CPTII,S$GLB,, | Performed by: INTERNAL MEDICINE

## 2022-08-03 PROCEDURE — 1101F PR PT FALLS ASSESS DOC 0-1 FALLS W/OUT INJ PAST YR: ICD-10-PCS | Mod: CPTII,S$GLB,, | Performed by: INTERNAL MEDICINE

## 2022-08-03 PROCEDURE — 3078F DIAST BP <80 MM HG: CPT | Mod: CPTII,S$GLB,, | Performed by: INTERNAL MEDICINE

## 2022-08-03 PROCEDURE — 1125F PR PAIN SEVERITY QUANTIFIED, PAIN PRESENT: ICD-10-PCS | Mod: CPTII,S$GLB,, | Performed by: INTERNAL MEDICINE

## 2022-08-03 PROCEDURE — 1126F PR PAIN SEVERITY QUANTIFIED, NO PAIN PRESENT: ICD-10-PCS | Mod: CPTII,S$GLB,, | Performed by: PHYSICIAN ASSISTANT

## 2022-08-03 RX ORDER — CLOTRIMAZOLE 10 MG/1
10 LOZENGE ORAL; TOPICAL
Qty: 50 TABLET | Refills: 0 | Status: SHIPPED | OUTPATIENT
Start: 2022-08-03 | End: 2022-08-13

## 2022-08-03 RX ORDER — DULOXETIN HYDROCHLORIDE 30 MG/1
30 CAPSULE, DELAYED RELEASE ORAL DAILY
Qty: 30 CAPSULE | Refills: 11 | Status: SHIPPED | OUTPATIENT
Start: 2022-08-03 | End: 2023-01-25

## 2022-08-03 NOTE — TELEPHONE ENCOUNTER
----- Message from Remedios Alvarez sent at 8/3/2022 10:21 AM CDT -----  Regarding: Change appt to earlier date  Good Morning,    Patient came into the office this morning. She's asking can she have an earlier appt date. Ms. Maldonado can be reached at [phone].

## 2022-08-03 NOTE — TELEPHONE ENCOUNTER
Spoke with patient who is requesting sooner appt for Botox, advised Botox appt scheduled for specific days due to ordering meds, but would forward to provider for approval to move to different day. Verbalizes understanding.

## 2022-08-03 NOTE — TELEPHONE ENCOUNTER
----- Message from Fabio May sent at 8/3/2022 11:34 AM CDT -----  Contact: pt at [phone]  Type:  Patient Returning Call    Who Called:  pt  Who Left Message for Patient:  Reshma  Does the patient know what this is regarding?:  yes  Best Call Back Number:  [phone]  Additional Information:  pt is calling the office returning a call back to Reshma that she missed. Please call back and advise.

## 2022-08-03 NOTE — PROGRESS NOTES
Subjective:       Patient ID: Erica Masterson is a 76 y.o. female.    Chief Complaint: tightness in chest when lying down    Presents to clinic with numerous complaints.  She has a history of thyroid cancer and has had ordered thyroid removed.  She has had a multitude problems with pain since the surgery.  Patient states she has most of the symptoms only at nighttime.  She states she is unable to sleep at all and will wake up gasping for air.  She says then her entire body goes into a muscle spasm.  She does sleep with her head elevated in both the bed in a recliner.  She has been taking doxepin for sleep which has been completely ineffective.  She has tried Zanaflex in the past for the muscle spasms and she states that is also ineffective.  She is being followed by Physical Medicine and recently started on baclofen.  She was started on half of the tablet for the 1st week and is on her 2nd day of full tablets today.  She states she has not noticed any relief from this and is getting discouraged.  Patient is in clinic with her daughter-in-law who is requesting that the patient have a sleep study.  Patient has not tried melatonin for sleep.  She denies any suicidal or homicidal ideations.    Patient also complains of a white speckled covering over her mouth which is becoming sore when she eats.  She has not tried anything for the symptoms.  She was seen by her dentist but this was not present at the time of the exam.    Patient's thyroid is also off and her thyroid medication is being adjusted by Endocrinology.  She notes that she has increased hair loss.    Review of Systems   Constitutional: Positive for fatigue. Negative for activity change, appetite change, fever and unexpected weight change.   HENT: Negative for nasal congestion, dental problem, drooling, ear discharge, ear pain, facial swelling, hearing loss, mouth dryness, mouth sores, nosebleeds, postnasal drip, rhinorrhea, sinus pressure/congestion, sneezing,  "sore throat, tinnitus, trouble swallowing, voice change and goiter.         Mouth pain   Eyes: Negative for photophobia, discharge and visual disturbance.   Respiratory: Positive for chest tightness and shortness of breath. Negative for cough and wheezing.    Cardiovascular: Negative for chest pain, palpitations and leg swelling.   Gastrointestinal: Negative for abdominal pain, blood in stool, constipation, diarrhea, nausea and vomiting.   Endocrine: Negative for cold intolerance, heat intolerance, polydipsia, polyphagia and polyuria.   Genitourinary: Negative for difficulty urinating, dyspareunia, dysuria, hematuria, menstrual problem, pelvic pain, vaginal bleeding, vaginal discharge and vaginal pain.   Musculoskeletal: Positive for myalgias (Muscle spasms over her entire body). Negative for arthralgias, back pain, joint swelling and neck pain.   Integumentary:  Negative for color change and rash.   Neurological: Positive for numbness. Negative for dizziness, vertigo, tremors, seizures, syncope, facial asymmetry, speech difficulty, weakness, light-headedness, headaches, disturbances in coordination and coordination difficulties.   Hematological: Does not bruise/bleed easily.   Psychiatric/Behavioral: Positive for sleep disturbance. Negative for agitation, behavioral problems, confusion, decreased concentration, dysphoric mood, hallucinations, self-injury and suicidal ideas. The patient is nervous/anxious. The patient is not hyperactive.          Objective:       Vitals:    08/03/22 0913   BP: 136/60   BP Location: Right arm   Patient Position: Sitting   BP Method: Medium (Manual)   Pulse: 78   Temp: 98.7 °F (37.1 °C)   TempSrc: Oral   SpO2: 98%   Weight: 66.7 kg (147 lb 0.8 oz)   Height: 5' 5.75" (1.67 m)     Physical Exam  Constitutional:       General: She is not in acute distress.     Appearance: Normal appearance. She is well-developed. She is not ill-appearing.   HENT:      Head: Normocephalic and atraumatic. "      Mouth/Throat:      Lips: Pink.      Mouth: Mucous membranes are moist. No oral lesions.      Tongue: No lesions. Tongue does not deviate from midline.      Palate: No mass and lesions.      Pharynx: Oropharynx is clear. Uvula midline. No oropharyngeal exudate.      Tonsils: No tonsillar exudate or tonsillar abscesses.      Comments: There is a thick white coating on the tongue and some patchy white areas on the inner cheeks consistent with thrush  Eyes:      Conjunctiva/sclera: Conjunctivae normal.      Pupils: Pupils are equal, round, and reactive to light.   Neck:      Vascular: No JVD.   Cardiovascular:      Rate and Rhythm: Normal rate and regular rhythm.      Heart sounds: No murmur heard.    No friction rub. No gallop.   Pulmonary:      Effort: Pulmonary effort is normal. No respiratory distress.      Breath sounds: Normal breath sounds. No wheezing or rales.   Musculoskeletal:      Cervical back: Normal range of motion and neck supple.   Skin:     General: Skin is warm and dry.   Neurological:      Mental Status: She is alert and oriented to person, place, and time.   Psychiatric:         Attention and Perception: Attention normal.         Mood and Affect: Mood is anxious.         Speech: Speech normal.         Behavior: Behavior normal.         Thought Content: Thought content normal.         Cognition and Memory: Cognition and memory normal.         Judgment: Judgment normal.         Assessment:       Problem List Items Addressed This Visit     Myopathy, unspecified    Sleep disorder      Other Visit Diagnoses     Thrush    -  Primary    Relevant Medications    clotrimazole (MYCELEX) 10 mg sol    Anxiety        uncontrolled, start Cymbalta and RTC in 2-3 weeks for med check.  Night time awaking sound like panic attacks    Relevant Medications    DULoxetine (CYMBALTA) 30 MG capsule    Diabetic polyneuropathy associated with type 2 diabetes mellitus        uncontrolled, start cymbalta.      Relevant  Medications    DULoxetine (CYMBALTA) 30 MG capsule    Night muscle spasms        try tonic water before bed time. Sleep study    SOB (shortness of breath)        only at night.  Continue to sleep with head elevated.           Plan:       Erica was seen today for tightness in chest when lying down.    Diagnoses and all orders for this visit:    Thrush  -     clotrimazole (MYCELEX) 10 mg sol; Take 1 tablet (10 mg total) by mouth 5 (five) times daily. for 10 days    Anxiety  Comments:  uncontrolled, start Cymbalta and RTC in 2-3 weeks for med check.  Night time awaking sound like panic attacks  Orders:  -     DULoxetine (CYMBALTA) 30 MG capsule; Take 1 capsule (30 mg total) by mouth once daily.    Diabetic polyneuropathy associated with type 2 diabetes mellitus  Comments:  uncontrolled, start cymbalta.    Orders:  -     DULoxetine (CYMBALTA) 30 MG capsule; Take 1 capsule (30 mg total) by mouth once daily.    Sleep disorder  Comments:  stop doxepin, refer to sleep study.      Myopathy, unspecified  Comments:  uncontrolled, encouraged follow up if Baclofen is not effective in the next week as she takes full dose.     Night muscle spasms  Comments:  try tonic water before bed time. Sleep study    SOB (shortness of breath)  Comments:  only at night.  Continue to sleep with head elevated.     I think patient having a sleep study is an excellent idea.  It also sounds like patient is having panic attacks at night and when I describe a panic attack patient does agree that sounds like her symptoms.  I encouraged the patient to drink tonic water as well as take melatonin for sleep until we have sleep study results.  I do not want start her on anything for sleep and further suppress respiratory symptoms if that is truly what is happening.  I encouraged patient to give baclofen adequate time to work and if it does not work she can contact Physical Medicine.  I would like patient to follow up on her Cymbalta in 2-3 weeks with   Lucia.  She is to call with any problems with the medication prior to that appointment.  We will stop doxepin as it is not providing her any relief for her insomnia and does interact with the Cymbalta.  She is having some diabetic neuropathy so hopefully this will also help that as well as the panic attacks.  Discussed that the medication would take several weeks to work.  Patient and daughter expressed full understanding.  She is also to let us know if the thrush does not resolve and they expressed understanding for this.

## 2022-08-03 NOTE — PROGRESS NOTES
Subjective:       Patient ID: Erica Masterson is a 76 y.o. female.    This is an established patient.      Chief Complaint: Follow-up    PAST HISTORY:    Abdominal Pain  This is a new problem. The current episode started 1 to 4 weeks ago. The onset quality is undetermined. The problem occurs daily. Duration: variable. The problem has been waxing and waning. The pain is located in the epigastric region. The pain is at a severity of 6/10. The pain is moderate. The quality of the pain is dull. The abdominal pain does not radiate. Associated symptoms include hematochezia. Pertinent negatives include no constipation. Nothing aggravates the pain. The pain is relieved by bowel movements. Treatments tried: laxatives, stool softener.  Had linzess in the past. The treatment provided mild relief. Prior diagnostic workup includes lower endoscopy and upper endoscopy (Last EGD 2016.  Last colonoscopy 2018,).   She has a new anemia on recent lab work with iron studies suggestive of iron deficiency.  She also complains of rectal pain and burning which are new.   She also complains of increased GERD symptoms including belching and heartburn.    Patient had EGD/colonoscopy and pillcam.  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  No further intervention recommended for this unless anemia worsens.  She has been started on Linzess 145 mcg for chronic constipation with some marginal improvement but still has nausea and symptoms of incomplete evacuation.  She denies bleeding.  She is no longer straining but she still has crampy abdominal discomfort.    INTERVAL HISTORY:  Since last visit, patient has done quite well.  She denies any further issues with rectal pain or prolapse.  She denies UGI complaints.  She states that her BMs are better on her current regimen which includes miralax and Linzess PRN.  She states that she had one episode of BRBPR, mild amount, noted on tissue only.  No weight loss or other symptoms.   She does still complain of some intermittent globus sensation and upper abdominal bloating but she had EGD which was unremarkable for any cause.  She does have HH.  She saw the NP in clinic and had manometry recommended but this has not yet been done.      Review of Systems   HENT: Negative for trouble swallowing.    Respiratory: Negative for shortness of breath and wheezing.    Cardiovascular: Negative for palpitations.   Gastrointestinal: Positive for abdominal distention and hematochezia. Negative for blood in stool and constipation.   Skin: Negative for color change.   All other systems reviewed and are negative.      Objective:       Vitals:    08/03/22 1335   BP: (!) 129/59   BP Location: Left arm   Patient Position: Sitting   Weight: 66.8 kg (147 lb 4.3 oz)       Physical Exam  Constitutional:       Appearance: She is well-developed.   HENT:      Head: Normocephalic and atraumatic.   Eyes:      General: No scleral icterus.     Pupils: Pupils are equal, round, and reactive to light.   Cardiovascular:      Rate and Rhythm: Normal rate and regular rhythm.      Heart sounds: No murmur heard.  Pulmonary:      Effort: Pulmonary effort is normal.      Breath sounds: Normal breath sounds. No wheezing.   Abdominal:      General: Bowel sounds are normal. There is no distension.      Palpations: Abdomen is soft.      Tenderness: There is no abdominal tenderness.   Musculoskeletal:      Cervical back: Normal range of motion.   Lymphadenopathy:      Cervical: No cervical adenopathy.   Neurological:      Mental Status: She is alert and oriented to person, place, and time.         Lab Results   Component Value Date    WBC 6.39 05/12/2020    HGB 11.4 (L) 05/12/2020    HCT 36.3 (L) 05/12/2020    MCV 94 05/12/2020     05/12/2020         CMP  Sodium   Date Value Ref Range Status   07/19/2022 143 136 - 145 mmol/L Final     Potassium   Date Value Ref Range Status   07/19/2022 4.0 3.5 - 5.1 mmol/L Final     Chloride   Date  Value Ref Range Status   07/19/2022 104 95 - 110 mmol/L Final     CO2   Date Value Ref Range Status   07/19/2022 26 23 - 29 mmol/L Final     Glucose   Date Value Ref Range Status   07/19/2022 128 (H) 70 - 110 mg/dL Final     BUN   Date Value Ref Range Status   07/19/2022 17 8 - 23 mg/dL Final     Creatinine   Date Value Ref Range Status   07/19/2022 1.1 0.5 - 1.4 mg/dL Final   06/04/2013 0.9 0.5 - 1.4 mg/dL Final     Calcium   Date Value Ref Range Status   07/19/2022 8.8 8.7 - 10.5 mg/dL Final   06/04/2013 9.7 8.7 - 10.5 mg/dL Final     Total Protein   Date Value Ref Range Status   04/28/2022 7.2 6.0 - 8.4 g/dL Final     Albumin   Date Value Ref Range Status   04/28/2022 3.7 3.5 - 5.2 g/dL Final     Total Bilirubin   Date Value Ref Range Status   04/28/2022 0.6 0.1 - 1.0 mg/dL Final     Comment:     For infants and newborns, interpretation of results should be based  on gestational age, weight and in agreement with clinical  observations.    Premature Infant recommended reference ranges:  Up to 24 hours.............<8.0 mg/dL  Up to 48 hours............<12.0 mg/dL  3-5 days..................<15.0 mg/dL  6-29 days.................<15.0 mg/dL       Alkaline Phosphatase   Date Value Ref Range Status   04/28/2022 41 (L) 55 - 135 U/L Final     AST   Date Value Ref Range Status   04/28/2022 16 10 - 40 U/L Final     ALT   Date Value Ref Range Status   04/28/2022 17 10 - 44 U/L Final     Anion Gap   Date Value Ref Range Status   07/19/2022 13 8 - 16 mmol/L Final   06/04/2013 9 5 - 15 meq/L Final     eGFR if    Date Value Ref Range Status   07/19/2022 56 (A) >60 mL/min/1.73 m^2 Final     eGFR if non    Date Value Ref Range Status   07/19/2022 48 (A) >60 mL/min/1.73 m^2 Final     Comment:     Calculation used to obtain the estimated glomerular filtration  rate (eGFR) is the CKD-EPI equation.          Assessment:       1. RONNIE (generalized anxiety disorder), 2019    2. Stage 3a chronic kidney  disease    3. History of colon polyps    4. Abdominal bloating        Plan:       1.  Recommend daily exercise, adequate water intake, and high fiber diet.  Recommend daily miralax (17g PO once or twice daily) with intermittently dosed dulcolax (every 2-3 days)  to facilitate bowel movements.  If no relief with this, consider adding emollient laxative (castor oil or mineral oil) +/- enema.  2.  Continue Linzess to 290 mcg as I believe her symptoms are largely being driven by chronic constipation.  3.  Antireflux precautions including avoidance of late night eating and lying down soon after eating.     4.  Patient to schedule manometry as ordered.  5.  Colonoscopy due in 2023 for surveillance

## 2022-08-04 ENCOUNTER — TELEPHONE (OUTPATIENT)
Dept: FAMILY MEDICINE | Facility: CLINIC | Age: 77
End: 2022-08-04
Payer: MEDICARE

## 2022-08-04 ENCOUNTER — HOSPITAL ENCOUNTER (EMERGENCY)
Facility: HOSPITAL | Age: 77
Discharge: HOME | End: 2022-08-04
Attending: EMERGENCY MEDICINE
Payer: MEDICARE

## 2022-08-04 ENCOUNTER — NURSE TRIAGE (OUTPATIENT)
Dept: ADMINISTRATIVE | Facility: CLINIC | Age: 77
End: 2022-08-04
Payer: MEDICARE

## 2022-08-04 ENCOUNTER — TELEPHONE (OUTPATIENT)
Dept: ENDOCRINOLOGY | Facility: CLINIC | Age: 77
End: 2022-08-04
Payer: MEDICARE

## 2022-08-04 VITALS
DIASTOLIC BLOOD PRESSURE: 73 MMHG | TEMPERATURE: 98 F | HEART RATE: 87 BPM | HEIGHT: 65 IN | OXYGEN SATURATION: 100 % | RESPIRATION RATE: 20 BRPM | SYSTOLIC BLOOD PRESSURE: 154 MMHG | WEIGHT: 147 LBS | BODY MASS INDEX: 24.49 KG/M2

## 2022-08-04 DIAGNOSIS — H00.014 HORDEOLUM EXTERNUM OF LEFT UPPER EYELID: ICD-10-CM

## 2022-08-04 DIAGNOSIS — F41.9 ANXIETY: Primary | ICD-10-CM

## 2022-08-04 DIAGNOSIS — B37.0 THRUSH: ICD-10-CM

## 2022-08-04 DIAGNOSIS — R06.02 SHORTNESS OF BREATH: ICD-10-CM

## 2022-08-04 LAB
ALBUMIN SERPL BCP-MCNC: 3.8 G/DL (ref 3.5–5.2)
ALP SERPL-CCNC: 57 U/L (ref 55–135)
ALT SERPL W/O P-5'-P-CCNC: 15 U/L (ref 10–44)
ANION GAP SERPL CALC-SCNC: 11 MMOL/L (ref 8–16)
AST SERPL-CCNC: 17 U/L (ref 10–40)
BACTERIA #/AREA URNS HPF: NORMAL /HPF
BASOPHILS # BLD AUTO: 0.03 K/UL (ref 0–0.2)
BASOPHILS NFR BLD: 0.5 % (ref 0–1.9)
BILIRUB SERPL-MCNC: 0.7 MG/DL (ref 0.1–1)
BILIRUB UR QL STRIP: NEGATIVE
BNP SERPL-MCNC: 64 PG/ML (ref 0–99)
BUN SERPL-MCNC: 19 MG/DL (ref 8–23)
CALCIUM SERPL-MCNC: 8.2 MG/DL (ref 8.7–10.5)
CHLORIDE SERPL-SCNC: 102 MMOL/L (ref 95–110)
CLARITY UR: CLEAR
CO2 SERPL-SCNC: 26 MMOL/L (ref 23–29)
COLOR UR: YELLOW
CREAT SERPL-MCNC: 1 MG/DL (ref 0.5–1.4)
DIFFERENTIAL METHOD: ABNORMAL
EOSINOPHIL # BLD AUTO: 0.1 K/UL (ref 0–0.5)
EOSINOPHIL NFR BLD: 0.8 % (ref 0–8)
ERYTHROCYTE [DISTWIDTH] IN BLOOD BY AUTOMATED COUNT: 12.6 % (ref 11.5–14.5)
EST. GFR  (NO RACE VARIABLE): 58 ML/MIN/1.73 M^2
GLUCOSE SERPL-MCNC: 101 MG/DL (ref 70–110)
GLUCOSE UR QL STRIP: NEGATIVE
HCT VFR BLD AUTO: 38.7 % (ref 37–48.5)
HGB BLD-MCNC: 12.5 G/DL (ref 12–16)
HGB UR QL STRIP: ABNORMAL
IMM GRANULOCYTES # BLD AUTO: 0.01 K/UL (ref 0–0.04)
IMM GRANULOCYTES NFR BLD AUTO: 0.2 % (ref 0–0.5)
KETONES UR QL STRIP: NEGATIVE
LEUKOCYTE ESTERASE UR QL STRIP: NEGATIVE
LYMPHOCYTES # BLD AUTO: 1.5 K/UL (ref 1–4.8)
LYMPHOCYTES NFR BLD: 24 % (ref 18–48)
MCH RBC QN AUTO: 28.7 PG (ref 27–31)
MCHC RBC AUTO-ENTMCNC: 32.3 G/DL (ref 32–36)
MCV RBC AUTO: 89 FL (ref 82–98)
MICROSCOPIC COMMENT: NORMAL
MONOCYTES # BLD AUTO: 0.4 K/UL (ref 0.3–1)
MONOCYTES NFR BLD: 6.2 % (ref 4–15)
NEUTROPHILS # BLD AUTO: 4.4 K/UL (ref 1.8–7.7)
NEUTROPHILS NFR BLD: 68.3 % (ref 38–73)
NITRITE UR QL STRIP: NEGATIVE
NRBC BLD-RTO: 0 /100 WBC
PH UR STRIP: 6 [PH] (ref 5–8)
PLATELET # BLD AUTO: 286 K/UL (ref 150–450)
PMV BLD AUTO: 9 FL (ref 9.2–12.9)
POTASSIUM SERPL-SCNC: 4.4 MMOL/L (ref 3.5–5.1)
PROT SERPL-MCNC: 7.6 G/DL (ref 6–8.4)
PROT UR QL STRIP: NEGATIVE
RBC # BLD AUTO: 4.36 M/UL (ref 4–5.4)
RBC #/AREA URNS HPF: 2 /HPF (ref 0–4)
SARS-COV-2 RDRP RESP QL NAA+PROBE: NEGATIVE
SODIUM SERPL-SCNC: 139 MMOL/L (ref 136–145)
SP GR UR STRIP: 1.01 (ref 1–1.03)
SQUAMOUS #/AREA URNS HPF: 1 /HPF
TROPONIN I SERPL DL<=0.01 NG/ML-MCNC: 0.01 NG/ML (ref 0–0.03)
URN SPEC COLLECT METH UR: ABNORMAL
UROBILINOGEN UR STRIP-ACNC: 1 EU/DL
WBC # BLD AUTO: 6.42 K/UL (ref 3.9–12.7)
WBC #/AREA URNS HPF: 4 /HPF (ref 0–5)

## 2022-08-04 PROCEDURE — 25000003 PHARM REV CODE 250: Performed by: EMERGENCY MEDICINE

## 2022-08-04 PROCEDURE — 36415 COLL VENOUS BLD VENIPUNCTURE: CPT | Performed by: NURSE PRACTITIONER

## 2022-08-04 PROCEDURE — 80053 COMPREHEN METABOLIC PANEL: CPT | Performed by: NURSE PRACTITIONER

## 2022-08-04 PROCEDURE — 93010 EKG 12-LEAD: ICD-10-PCS | Mod: ,,, | Performed by: INTERNAL MEDICINE

## 2022-08-04 PROCEDURE — 83880 ASSAY OF NATRIURETIC PEPTIDE: CPT | Performed by: NURSE PRACTITIONER

## 2022-08-04 PROCEDURE — 93010 ELECTROCARDIOGRAM REPORT: CPT | Mod: ,,, | Performed by: INTERNAL MEDICINE

## 2022-08-04 PROCEDURE — 93005 ELECTROCARDIOGRAM TRACING: CPT

## 2022-08-04 PROCEDURE — U0002 COVID-19 LAB TEST NON-CDC: HCPCS | Performed by: NURSE PRACTITIONER

## 2022-08-04 PROCEDURE — 85025 COMPLETE CBC W/AUTO DIFF WBC: CPT | Performed by: NURSE PRACTITIONER

## 2022-08-04 PROCEDURE — 99285 EMERGENCY DEPT VISIT HI MDM: CPT | Mod: 25

## 2022-08-04 PROCEDURE — 81000 URINALYSIS NONAUTO W/SCOPE: CPT | Performed by: NURSE PRACTITIONER

## 2022-08-04 PROCEDURE — 84484 ASSAY OF TROPONIN QUANT: CPT | Performed by: NURSE PRACTITIONER

## 2022-08-04 RX ORDER — NYSTATIN 100000 [USP'U]/ML
4 SUSPENSION ORAL 4 TIMES DAILY
Qty: 160 ML | Refills: 0 | Status: SHIPPED | OUTPATIENT
Start: 2022-08-04 | End: 2022-08-14

## 2022-08-04 RX ORDER — NYSTATIN 100000 [USP'U]/ML
500000 SUSPENSION ORAL
Status: COMPLETED | OUTPATIENT
Start: 2022-08-04 | End: 2022-08-04

## 2022-08-04 RX ORDER — ERYTHROMYCIN 5 MG/G
OINTMENT OPHTHALMIC EVERY 4 HOURS
Qty: 1 EACH | Refills: 0 | Status: SHIPPED | OUTPATIENT
Start: 2022-08-04 | End: 2022-08-09

## 2022-08-04 RX ADMIN — NYSTATIN 500000 UNITS: 100000 SUSPENSION ORAL at 09:08

## 2022-08-04 NOTE — FIRST PROVIDER EVALUATION
" Emergency Department TeleTriage Encounter Note      CHIEF COMPLAINT    Chief Complaint   Patient presents with    Shortness of Breath     With fatigue and pain x several months, episodes becoming more persistent and the worst at night        VITAL SIGNS   Initial Vitals [08/04/22 1535]   BP Pulse Resp Temp SpO2   (!) 141/71 77 20 98 °F (36.7 °C) 97 %      MAP       --            ALLERGIES    Review of patient's allergies indicates:  No Known Allergies    PROVIDER TRIAGE NOTE  This is a teletriage evaluation of a 76 y.o. female presenting to the ED complaining of fatigue and SOB, worsening over the past few months. States, "my body is burning."  Reports that she has been seen for the same complaints in the past but has not improved.  Denies fever and cough.     Well-appearing, speaking in full sentences.     Initial orders will be placed and care will be transferred to an alternate provider when patient is roomed for a full evaluation. Any additional orders and the final disposition will be determined by that provider.           ORDERS  Labs Reviewed   CBC W/ AUTO DIFFERENTIAL   COMPREHENSIVE METABOLIC PANEL   SARS-COV-2 RNA AMPLIFICATION, QUAL   B-TYPE NATRIURETIC PEPTIDE   TROPONIN I   URINALYSIS, REFLEX TO URINE CULTURE       ED Orders (720h ago, onward)    Start Ordered     Status Ordering Provider    08/04/22 1701 08/04/22 1701  CBC Auto Differential  STAT         Ordered MYESHA LE NYesenia    08/04/22 1701 08/04/22 1701  Comprehensive Metabolic Panel  STAT         Ordered MYESHA LE    08/04/22 1701 08/04/22 1701  Pulse Oximetry Continuous  Continuous         Ordered MYESHA LE NYesenia    08/04/22 1701 08/04/22 1701  Cardiac Monitoring - Adult  Continuous         Ordered MYESHA LE N.    08/04/22 1701 08/04/22 1701  EKG 12-lead  Once         Ordered MYESHA LE NYesenia    08/04/22 1701 08/04/22 1701  COVID-19 Rapid Screening  STAT         Ordered MIMI, " MYESHA N.    08/04/22 1701 08/04/22 1701  X-Ray Chest 1 View  1 time imaging         Ordered MIMI MYESHA N.    08/04/22 1701 08/04/22 1701  Brain Natriuretic Peptide  STAT         Ordered MIMI MYESHA N.    08/04/22 1701 08/04/22 1701  Troponin I  STAT         Ordered MYESHA LE.    08/04/22 1701 08/04/22 1701  Urinalysis, Reflex to Urine Culture Urine, Clean Catch  STAT         Ordered MYESHA LE            Virtual Visit Note: The provider triage portion of this emergency department evaluation and documentation was performed via [employer], a HIPAA-compliant telemedicine application, in concert with a tele-presenter in the room. A face to face patient evaluation with one of my colleagues will occur once the patient is placed in an emergency department room.      DISCLAIMER: This note was prepared with [employer] voice recognition transcription software. Garbled syntax, mangled pronouns, and other bizarre constructions may be attributed to that software system.

## 2022-08-04 NOTE — TELEPHONE ENCOUNTER
----- Message from Diane Naidu sent at 8/4/2022 12:04 PM CDT -----  Type: Needs Medical Advice  Who Called: Patient   Symptoms (please be specific):    How long has patient had these symptoms:    Pharmacy name and phone #:    Best Call Back Number: [phone]  Additional Information: Patient is requesting a call back from the nurse.

## 2022-08-04 NOTE — TELEPHONE ENCOUNTER
----- Message from Tim Mckeon sent at 8/4/2022  2:36 PM CDT -----  Type:  Same Day Appointment Request    Caller is requesting a same day appointment.  Caller declined first available appointment listed below.      Name of Caller:  Patient  When is the first available appointment?   Symptoms: Tightness-pain in both legs and left side and eyes  Best Call Back Number: [phone]  Additional Information:

## 2022-08-04 NOTE — ED PROVIDER NOTES
Encounter Date: 8/4/2022    SCRIBE #1 NOTE: I, Ludmilaenma Goldman, am scribing for, and in the presence of, Julián Oliver MD.       History     Chief Complaint   Patient presents with    Shortness of Breath     With fatigue and pain x several months, episodes becoming more persistent and the worst at night      Time seen by provider: 6:04 PM on 08/04/2022    Erica Masterson is a 76 y.o. female who presents to the ED with an onset of fatigue and pain that has occurred for several months. The patient states she has burning pain and she feels tight especially in her neck, upper arms, and legs. She states that her pains are always worse at night and when she wakes up and that it has been occurring more frequently. She recently also feels chest tightness, skin tightness on her neck, pain on her tongue, numbness in her mouth, and yesterday started with a swollen/tender left eyelid. The patient's daughter thinks she might have thrush in her mouth. The daughter also says that the patient will gasp for air from the pain. She has seen multiple doctors and been in the ED multiple times for this issue but has not been able to find any solutions with all the different treatments she has tried. The patient recently had a sleep study ordered but has not had it done yet. Her PCP is Narayan Myers MD. The patient denies burping, fever or any other symptoms at this time. The patient has a PMHx of diverticulosis, GERD, HLD, myopathy, multinodular goiter, degenerative arthritis of knee, HTN, pre-diabetes, thyroid cancer, and adenomatous polyp of colon. The patient has a PSHx of lung lobectomy, cholecystectomy, hysterectomy, and thyroidectomy.      The history is provided by the patient and a relative.     Review of patient's allergies indicates:  No Known Allergies  Past Medical History:   Diagnosis Date    Adenomatous polyp of colon 3/26/2012    Allergy     Anxiety     Cancer     Cataract     Colon polyp     Degenerative  arthritis of knee 04/03/2012    Diverticulosis     Encounter for blood transfusion     GERD (gastroesophageal reflux disease)     History of thyroid cancer 2021    Hyperlipidemia     Hypertension     Lateral meniscal tear 5/20/2013    Multinodular goiter 03/26/2012    Myopathy, unspecified 01/18/2010    Tuberculosis      Past Surgical History:   Procedure Laterality Date    BREAST BIOPSY Left 2015    benign    CHOLECYSTECTOMY      COLONOSCOPY  12/2/15    Dr. Britton, multiple polyps, recheck five years-three years if more than 2 polyps are adenomatous    COLONOSCOPY N/A 12/2/2015    COLONOSCOPY N/A 3/20/2019    Procedure: COLONOSCOPY;  Surgeon: Jose Britton MD;  Location: [facility];  Service: Endoscopy;  Laterality: N/A;    COLONOSCOPY N/A 5/20/2020    Dr. Britton; internal hemorrhoids; diverticulosis; polyps removed; repeat in 3 years    CYSTOSCOPY N/A 8/26/2020    Procedure: CYSTOSCOPY;  Surgeon: Charlotte Walters Jr., MD;  [facility];  Service: Urology;  Laterality: N/A;    DISSECTION OF NECK Left 9/13/2021    Procedure: DISSECTION, NECK;  Surgeon: Ryan Torres MD;  Location: [facility] OR [address] [place] [address];  Service: ENT;  Laterality: Left;    ESOPHAGOGASTRODUODENOSCOPY N/A 5/20/2020    Dr. Britton; small hiatal hernia; gastritis; 8 gastric polyps removed    ESOPHAGOGASTRODUODENOSCOPY N/A 4/1/2022    Procedure: EGD (ESOPHAGOGASTRODUODENOSCOPY);  Surgeon: Jose Britton MD;  Location: [facility];  Service: Endoscopy;  Laterality: N/A;    FOOT SURGERY      HYSTERECTOMY      TVH/BSO > 20 years    INTRALUMINAL GASTROINTESTINAL TRACT IMAGING VIA CAPSULE N/A 6/4/2020    Procedure: IMAGING PROCEDURE, GI TRACT, INTRALUMINAL, VIA CAPSULE;  Surgeon: Jose Britton MD;  Location: [facility];  Service: Endoscopy;  Laterality: N/A;    KNEE SURGERY  06/06/2013    right knee tear Dr Iverson     LAPAROSCOPIC CHOLECYSTECTOMY N/A 9/17/2020    Procedure: CHOLECYSTECTOMY, LAPAROSCOPIC;  Surgeon: Forrest IZAGUIRRE  MD Jeremías;  Location: [facility] OR;  Service: General;  Laterality: N/A;    LUNG LOBECTOMY  1966    right middle lobectomy, due to Tb    OOPHORECTOMY      SHOULDER SURGERY      francis     THYROIDECTOMY Bilateral 5/19/2021    Procedure: THYROIDECTOMY;  Surgeon: Jamia Amezquita MD;  [facility];  Service: General;  Laterality: Bilateral;    THYROIDECTOMY Bilateral 9/13/2021    Procedure: THYROIDECTOMY WITH INTRA-OP PTH;  Surgeon: Ryan Torres MD;  Location: [facility] OR [address] [place] [address];  Service: ENT;  Laterality: Bilateral;  NIM tube  Intraop PTH     Family History   Problem Relation Age of Onset    Colon cancer Other     Cancer Mother     Hypertension Mother     Hyperlipidemia Mother     Cancer Brother     Hypertension Father     Emphysema Father     Diabetes Son     Hypertension Son     Alcohol abuse Son     Diabetes Maternal Aunt     Alzheimer's disease Maternal Uncle     Cancer Maternal Grandmother     No Known Problems Daughter     Dementia Sister     Alzheimer's disease Sister     Breast cancer Sister 60    Obesity Paternal Uncle     Prostate cancer Other     Cancer Brother     Melanoma Neg Hx     Psoriasis Neg Hx     Lupus Neg Hx     Eczema Neg Hx     Amblyopia Neg Hx     Blindness Neg Hx     Cataracts Neg Hx     Glaucoma Neg Hx     Macular degeneration Neg Hx     Retinal detachment Neg Hx     Strabismus Neg Hx     Stroke Neg Hx     Thyroid cancer Neg Hx     Colon polyps Neg Hx     Ulcerative colitis Neg Hx     Stomach cancer Neg Hx     Rectal cancer Neg Hx      Social History     Tobacco Use    Smoking status: Never Smoker    Smokeless tobacco: Never Used   Substance Use Topics    Alcohol use: Not Currently     Comment: stopped 2019    Drug use: No     Review of Systems   Constitutional: Positive for fatigue. Negative for fever.   HENT: Negative for sore throat.         Positive for tongue pain. Positive for mouth numbness.   Eyes:        Positive for  swollen/tender eyelid.   Respiratory: Positive for chest tightness. Negative for shortness of breath.    Cardiovascular: Negative for chest pain.   Gastrointestinal: Negative for nausea.        Negative for burping.   Genitourinary: Negative for dysuria.   Musculoskeletal: Positive for myalgias. Negative for back pain.   Skin: Negative for rash.        Positive for tight skin.   Neurological: Negative for weakness.   Hematological: Does not bruise/bleed easily.       Physical Exam     Initial Vitals [08/04/22 1535]   BP Pulse Resp Temp SpO2   (!) 141/71 77 20 98 °F (36.7 °C) 97 %      MAP       --         Physical Exam    Nursing note and vitals reviewed.  Constitutional: She appears well-developed.  Non-toxic appearance.   HENT:   Head: Normocephalic and atraumatic.   Patient has white patches on the tongue.    Eyes: EOM are normal. Pupils are equal, round, and reactive to light.   The patient has edema on the left upper eyelid and swelling over the medial portion of the upper eyelid.   Neck: Neck supple.   Cardiovascular: Normal rate, regular rhythm, normal heart sounds and intact distal pulses. Exam reveals no gallop and no friction rub.    No murmur heard.  Pulses:       Dorsalis pedis pulses are 2+ on the right side and 2+ on the left side.   Pulmonary/Chest: Breath sounds normal. No respiratory distress. She has no decreased breath sounds. She has no wheezes. She has no rhonchi. She has no rales.   Abdominal: Abdomen is soft. Bowel sounds are normal. She exhibits no distension. There is no abdominal tenderness.   Musculoskeletal:         General: Normal range of motion.      Cervical back: Neck supple.     Neurological: She is alert and oriented to person, place, and time.   Skin: Skin is warm and dry.   Psychiatric: She has a normal mood and affect.         ED Course   Procedures  Labs Reviewed   CBC W/ AUTO DIFFERENTIAL - Abnormal; Notable for the following components:       Result Value    MPV 9.0 (*)      All other components within normal limits   COMPREHENSIVE METABOLIC PANEL - Abnormal; Notable for the following components:    Calcium 8.2 (*)     eGFR 58 (*)     All other components within normal limits   URINALYSIS, REFLEX TO URINE CULTURE - Abnormal; Notable for the following components:    Occult Blood UA 2+ (*)     All other components within normal limits    Narrative:     Specimen Source->Urine   SARS-COV-2 RNA AMPLIFICATION, QUAL   B-TYPE NATRIURETIC PEPTIDE   TROPONIN I   URINALYSIS MICROSCOPIC    Narrative:     Specimen Source->Urine          Imaging Results          X-Ray Chest 1 View (Final result)  Result time 08/04/22 18:09:15    Final result by Antony Giradr MD (08/04/22 18:09:15)                 Impression:      No acute cardiopulmonary abnormality.      Electronically signed by: Antony Girard  Date:    08/04/2022  Time:    18:09             Narrative:    EXAMINATION:  XR CHEST 1 VIEW    CLINICAL HISTORY:  shortness of breath;    TECHNIQUE:  Single frontal view of the chest was performed.    COMPARISON:  03/21/2022    FINDINGS:  Chronic elevation right hemidiaphragm.  No airspace disease.  Normal size heart.  Aortic arch atherosclerosis.  No pleural effusion or pneumothorax.  Cholecystectomy.  Additional surgical clips project over the lower neck.                                 Medications   nystatin 100,000 unit/mL suspension 500,000 Units (has no administration in time range)     Medical Decision Making:   History:   Old Medical Records: I decided to obtain old medical records.  Independently Interpreted Test(s):   I have ordered and independently interpreted X-rays - see prior notes.  I have ordered and independently interpreted EKG Reading(s) - see prior notes  Clinical Tests:   Lab Tests: Ordered and Reviewed  Radiological Study: Ordered and Reviewed  Medical Tests: Ordered and Reviewed          Scribe Attestation:   Scribe #1: I performed the above scribed service and the  documentation accurately describes the services I performed. I attest to the accuracy of the note.        ED Course as of 08/04/22 1954   Th Aug 04, 2022   1954 Unclear the etiology the patient's symptoms.  She definitely has anxiety.  She has had extensive workups by Gastroenterology primary care.  She has had MRIs.  She has been seen by Orthopedics.  She is in no visible distress at this time.  She has no signs of ischemia on her EKG.  Her EKG is completely normal.    EKG independently interpreted by me as rate 65 normal sinus rate rhythm axis and intervals with no ST segment elevation or depression.  She needs to follow up with primary care.  She is scheduled for an outpatient sleep study as well as EGD with manometry.  At this time she should continue with the melatonin that was prescribed her yesterday. [JS]      ED Course User Index  [JS] Julián Oliver MD           I, Dr. Julián Oliver personally performed the services described in this documentation. All medical record entries made by the scribe were at my direction and in my presence.  I have reviewed the chart and agree that the record reflects my personal performance and is accurate and complete. Julián Oliver MD.  7:56 PM 08/04/2022    DISCLAIMER: This note was prepared with Dragon NaturallySpeaking voice recognition transcription software. Garbled syntax, mangled pronouns, and other bizarre constructions may be attributed to that software system   Clinical Impression:   Final diagnoses:  [R06.02] Shortness of breath  [F41.9] Anxiety (Primary)          ED Disposition Condition    Discharge Stable        ED Prescriptions     None        Follow-up Information     Follow up With Specialties Details Why Contact Info    Narayan Myers MD Family Medicine Schedule an appointment as soon as possible for a visit   [address] [facility] [address]  [place] [address]  [phone]             Julián Oliver MD  08/04/22 1956

## 2022-08-04 NOTE — TELEPHONE ENCOUNTER
Unable to locate an appointment for today's date. Call placed to patient for notification. Patient states she was on the other line with [facility]'s on call nurse. Writer advised patient unable to locate appointment today. Recommended patient be seen at urgent care or ER today. Patient verbalized understanding.

## 2022-08-04 NOTE — TELEPHONE ENCOUNTER
"Pt c/o nausea, trembling, left side shoulder to bilateral legs "tight", feels weak, pt and daughter deny swelling. During triage call, pt placed call on hold and when came back on line, states she spoke to Dr. Myers nurse who advised her to go to ER now. Unable to complete triage protocol questions. Daughter states bringing pt to ER now, call disconnected.     Reason for Disposition   Caller has already spoken with the PCP (or office), and has no further questions    Additional Information   Negative: SEVERE difficulty breathing (e.g., struggling for each breath, speaks in single words)   Negative: Passed out (i.e., fainted, collapsed and was not responding)   Negative: Difficult to awaken or acting confused (e.g., disoriented, slurred speech)   Negative: Shock suspected (e.g., cold/pale/clammy skin, too weak to stand, low BP, rapid pulse)    Protocols used: NO CONTACT OR DUPLICATE CONTACT CALL-A-OH, CHEST PAIN-A-OH      "

## 2022-08-05 ENCOUNTER — TELEPHONE (OUTPATIENT)
Dept: OPTOMETRY | Facility: CLINIC | Age: 77
End: 2022-08-05
Payer: MEDICARE

## 2022-08-05 NOTE — TELEPHONE ENCOUNTER
----- Message from Mack Guerra sent at 8/5/2022  2:21 PM CDT -----  Contact: Self  Type:  Sooner Appointment Request    Caller is requesting a sooner appointment.  Caller declined first available appointment listed below.  Caller will not accept being placed on the waitlist and is requesting a message be sent to doctor.    Name of Caller:  Patient  When is the first available appointment?  10/10  Symptoms:  Follow up, eye infection  Best Call Back Number:  [phone]   Additional Information:

## 2022-08-06 ENCOUNTER — HOSPITAL ENCOUNTER (OUTPATIENT)
Facility: HOSPITAL | Age: 77
Discharge: HOME HEALTH | End: 2022-08-09
Attending: EMERGENCY MEDICINE | Admitting: HOSPITALIST
Payer: MEDICARE

## 2022-08-06 DIAGNOSIS — R07.9 CHEST PAIN: ICD-10-CM

## 2022-08-06 DIAGNOSIS — M62.838 MUSCLE SPASM: Primary | ICD-10-CM

## 2022-08-06 DIAGNOSIS — R20.0 LEFT SIDED NUMBNESS: ICD-10-CM

## 2022-08-06 DIAGNOSIS — E83.51 HYPOCALCEMIA: ICD-10-CM

## 2022-08-06 DIAGNOSIS — R00.0 TACHYCARDIA: ICD-10-CM

## 2022-08-06 LAB
ALBUMIN SERPL BCP-MCNC: 3.3 G/DL (ref 3.5–5.2)
ALP SERPL-CCNC: 53 U/L (ref 55–135)
ALT SERPL W/O P-5'-P-CCNC: 15 U/L (ref 10–44)
ANION GAP SERPL CALC-SCNC: 12 MMOL/L (ref 8–16)
AST SERPL-CCNC: 14 U/L (ref 10–40)
BACTERIA #/AREA URNS HPF: NORMAL /HPF
BASOPHILS # BLD AUTO: 0.02 K/UL (ref 0–0.2)
BASOPHILS NFR BLD: 0.4 % (ref 0–1.9)
BILIRUB SERPL-MCNC: 0.6 MG/DL (ref 0.1–1)
BILIRUB UR QL STRIP: NEGATIVE
BUN SERPL-MCNC: 14 MG/DL (ref 8–23)
CA-I BLDV-SCNC: 0.96 MMOL/L (ref 1.06–1.42)
CALCIUM SERPL-MCNC: 7.9 MG/DL (ref 8.7–10.5)
CALCIUM SERPL-MCNC: 8 MG/DL (ref 8.7–10.5)
CHLORIDE SERPL-SCNC: 104 MMOL/L (ref 95–110)
CK SERPL-CCNC: 206 U/L (ref 20–180)
CLARITY UR: CLEAR
CO2 SERPL-SCNC: 25 MMOL/L (ref 23–29)
COLOR UR: YELLOW
CREAT SERPL-MCNC: 1.1 MG/DL (ref 0.5–1.4)
CRP SERPL-MCNC: 2.5 MG/L (ref 0–8.2)
DIFFERENTIAL METHOD: ABNORMAL
EOSINOPHIL # BLD AUTO: 0.1 K/UL (ref 0–0.5)
EOSINOPHIL NFR BLD: 1.8 % (ref 0–8)
ERYTHROCYTE [DISTWIDTH] IN BLOOD BY AUTOMATED COUNT: 12.8 % (ref 11.5–14.5)
EST. GFR  (NO RACE VARIABLE): 51 ML/MIN/1.73 M^2
GLUCOSE SERPL-MCNC: 112 MG/DL (ref 70–110)
GLUCOSE UR QL STRIP: NEGATIVE
HCT VFR BLD AUTO: 34.4 % (ref 37–48.5)
HGB BLD-MCNC: 11.5 G/DL (ref 12–16)
HGB UR QL STRIP: ABNORMAL
IMM GRANULOCYTES # BLD AUTO: 0.01 K/UL (ref 0–0.04)
IMM GRANULOCYTES NFR BLD AUTO: 0.2 % (ref 0–0.5)
KETONES UR QL STRIP: NEGATIVE
LEUKOCYTE ESTERASE UR QL STRIP: NEGATIVE
LYMPHOCYTES # BLD AUTO: 2.1 K/UL (ref 1–4.8)
LYMPHOCYTES NFR BLD: 36.3 % (ref 18–48)
MAGNESIUM SERPL-MCNC: 1.9 MG/DL (ref 1.6–2.6)
MCH RBC QN AUTO: 29 PG (ref 27–31)
MCHC RBC AUTO-ENTMCNC: 33.4 G/DL (ref 32–36)
MCV RBC AUTO: 87 FL (ref 82–98)
MICROSCOPIC COMMENT: NORMAL
MONOCYTES # BLD AUTO: 0.5 K/UL (ref 0.3–1)
MONOCYTES NFR BLD: 9.2 % (ref 4–15)
NEUTROPHILS # BLD AUTO: 3 K/UL (ref 1.8–7.7)
NEUTROPHILS NFR BLD: 52.1 % (ref 38–73)
NITRITE UR QL STRIP: NEGATIVE
NRBC BLD-RTO: 0 /100 WBC
PH UR STRIP: 6 [PH] (ref 5–8)
PHOSPHATE SERPL-MCNC: 3.7 MG/DL (ref 2.7–4.5)
PLATELET # BLD AUTO: 257 K/UL (ref 150–450)
PMV BLD AUTO: 8.9 FL (ref 9.2–12.9)
POCT GLUCOSE: 99 MG/DL (ref 70–110)
POTASSIUM SERPL-SCNC: 3.7 MMOL/L (ref 3.5–5.1)
PROT SERPL-MCNC: 6.8 G/DL (ref 6–8.4)
PROT UR QL STRIP: NEGATIVE
RBC # BLD AUTO: 3.97 M/UL (ref 4–5.4)
RBC #/AREA URNS HPF: 3 /HPF (ref 0–4)
SARS-COV-2 RDRP RESP QL NAA+PROBE: NEGATIVE
SODIUM SERPL-SCNC: 141 MMOL/L (ref 136–145)
SP GR UR STRIP: <=1.005 (ref 1–1.03)
T4 FREE SERPL-MCNC: 1.46 NG/DL (ref 0.71–1.51)
TROPONIN I SERPL DL<=0.01 NG/ML-MCNC: 0.01 NG/ML (ref 0–0.03)
TSH SERPL DL<=0.005 MIU/L-ACNC: <0.01 UIU/ML (ref 0.4–4)
URN SPEC COLLECT METH UR: ABNORMAL
UROBILINOGEN UR STRIP-ACNC: NEGATIVE EU/DL
WBC # BLD AUTO: 5.67 K/UL (ref 3.9–12.7)

## 2022-08-06 PROCEDURE — 84439 ASSAY OF FREE THYROXINE: CPT | Performed by: EMERGENCY MEDICINE

## 2022-08-06 PROCEDURE — 94761 N-INVAS EAR/PLS OXIMETRY MLT: CPT

## 2022-08-06 PROCEDURE — 84484 ASSAY OF TROPONIN QUANT: CPT | Performed by: NURSE PRACTITIONER

## 2022-08-06 PROCEDURE — 96361 HYDRATE IV INFUSION ADD-ON: CPT

## 2022-08-06 PROCEDURE — 36415 COLL VENOUS BLD VENIPUNCTURE: CPT | Performed by: NURSE PRACTITIONER

## 2022-08-06 PROCEDURE — 86140 C-REACTIVE PROTEIN: CPT | Performed by: EMERGENCY MEDICINE

## 2022-08-06 PROCEDURE — U0002 COVID-19 LAB TEST NON-CDC: HCPCS | Performed by: EMERGENCY MEDICINE

## 2022-08-06 PROCEDURE — G0378 HOSPITAL OBSERVATION PER HR: HCPCS

## 2022-08-06 PROCEDURE — 93005 ELECTROCARDIOGRAM TRACING: CPT

## 2022-08-06 PROCEDURE — 96372 THER/PROPH/DIAG INJ SC/IM: CPT | Mod: 59 | Performed by: NURSE PRACTITIONER

## 2022-08-06 PROCEDURE — 96365 THER/PROPH/DIAG IV INF INIT: CPT

## 2022-08-06 PROCEDURE — 96375 TX/PRO/DX INJ NEW DRUG ADDON: CPT

## 2022-08-06 PROCEDURE — 63600175 PHARM REV CODE 636 W HCPCS: Performed by: EMERGENCY MEDICINE

## 2022-08-06 PROCEDURE — 82310 ASSAY OF CALCIUM: CPT | Mod: 59 | Performed by: NURSE PRACTITIONER

## 2022-08-06 PROCEDURE — 80053 COMPREHEN METABOLIC PANEL: CPT | Performed by: EMERGENCY MEDICINE

## 2022-08-06 PROCEDURE — 99900035 HC TECH TIME PER 15 MIN (STAT)

## 2022-08-06 PROCEDURE — 84443 ASSAY THYROID STIM HORMONE: CPT | Performed by: EMERGENCY MEDICINE

## 2022-08-06 PROCEDURE — 36415 COLL VENOUS BLD VENIPUNCTURE: CPT | Performed by: EMERGENCY MEDICINE

## 2022-08-06 PROCEDURE — 81000 URINALYSIS NONAUTO W/SCOPE: CPT | Performed by: EMERGENCY MEDICINE

## 2022-08-06 PROCEDURE — 25000003 PHARM REV CODE 250: Performed by: EMERGENCY MEDICINE

## 2022-08-06 PROCEDURE — 25000003 PHARM REV CODE 250: Performed by: NURSE PRACTITIONER

## 2022-08-06 PROCEDURE — 82330 ASSAY OF CALCIUM: CPT | Performed by: EMERGENCY MEDICINE

## 2022-08-06 PROCEDURE — 82550 ASSAY OF CK (CPK): CPT | Performed by: EMERGENCY MEDICINE

## 2022-08-06 PROCEDURE — 85025 COMPLETE CBC W/AUTO DIFF WBC: CPT | Performed by: EMERGENCY MEDICINE

## 2022-08-06 PROCEDURE — 99285 EMERGENCY DEPT VISIT HI MDM: CPT | Mod: 25

## 2022-08-06 PROCEDURE — 96366 THER/PROPH/DIAG IV INF ADDON: CPT

## 2022-08-06 PROCEDURE — 93010 ELECTROCARDIOGRAM REPORT: CPT | Mod: ,,, | Performed by: INTERNAL MEDICINE

## 2022-08-06 PROCEDURE — 83735 ASSAY OF MAGNESIUM: CPT | Performed by: EMERGENCY MEDICINE

## 2022-08-06 PROCEDURE — 63600175 PHARM REV CODE 636 W HCPCS: Performed by: NURSE PRACTITIONER

## 2022-08-06 PROCEDURE — 93010 EKG 12-LEAD: ICD-10-PCS | Mod: ,,, | Performed by: INTERNAL MEDICINE

## 2022-08-06 PROCEDURE — 96374 THER/PROPH/DIAG INJ IV PUSH: CPT

## 2022-08-06 PROCEDURE — 84100 ASSAY OF PHOSPHORUS: CPT | Performed by: EMERGENCY MEDICINE

## 2022-08-06 RX ORDER — LANOLIN ALCOHOL/MO/W.PET/CERES
800 CREAM (GRAM) TOPICAL
Status: DISCONTINUED | OUTPATIENT
Start: 2022-08-06 | End: 2022-08-09 | Stop reason: HOSPADM

## 2022-08-06 RX ORDER — ACETAMINOPHEN 325 MG/1
650 TABLET ORAL EVERY 8 HOURS PRN
Status: DISCONTINUED | OUTPATIENT
Start: 2022-08-06 | End: 2022-08-09 | Stop reason: HOSPADM

## 2022-08-06 RX ORDER — CALCIUM CARBONATE 200(500)MG
2 TABLET,CHEWABLE ORAL 3 TIMES DAILY
Status: DISCONTINUED | OUTPATIENT
Start: 2022-08-06 | End: 2022-08-09 | Stop reason: HOSPADM

## 2022-08-06 RX ORDER — AMLODIPINE BESYLATE 2.5 MG/1
2.5 TABLET ORAL DAILY
Status: DISCONTINUED | OUTPATIENT
Start: 2022-08-06 | End: 2022-08-09 | Stop reason: HOSPADM

## 2022-08-06 RX ORDER — IPRATROPIUM BROMIDE AND ALBUTEROL SULFATE 2.5; .5 MG/3ML; MG/3ML
3 SOLUTION RESPIRATORY (INHALATION) EVERY 4 HOURS PRN
Status: DISCONTINUED | OUTPATIENT
Start: 2022-08-06 | End: 2022-08-09 | Stop reason: HOSPADM

## 2022-08-06 RX ORDER — NAPROXEN SODIUM 220 MG/1
81 TABLET, FILM COATED ORAL DAILY
Status: DISCONTINUED | OUTPATIENT
Start: 2022-08-07 | End: 2022-08-09 | Stop reason: HOSPADM

## 2022-08-06 RX ORDER — PANTOPRAZOLE SODIUM 40 MG/1
40 TABLET, DELAYED RELEASE ORAL DAILY
Status: DISCONTINUED | OUTPATIENT
Start: 2022-08-06 | End: 2022-08-09 | Stop reason: HOSPADM

## 2022-08-06 RX ORDER — SODIUM,POTASSIUM PHOSPHATES 280-250MG
2 POWDER IN PACKET (EA) ORAL
Status: DISCONTINUED | OUTPATIENT
Start: 2022-08-06 | End: 2022-08-09 | Stop reason: HOSPADM

## 2022-08-06 RX ORDER — MAG HYDROX/ALUMINUM HYD/SIMETH 200-200-20
30 SUSPENSION, ORAL (FINAL DOSE FORM) ORAL 4 TIMES DAILY PRN
Status: DISCONTINUED | OUTPATIENT
Start: 2022-08-06 | End: 2022-08-09 | Stop reason: HOSPADM

## 2022-08-06 RX ORDER — LEVOTHYROXINE SODIUM 88 UG/1
88 TABLET ORAL
Status: DISCONTINUED | OUTPATIENT
Start: 2022-08-07 | End: 2022-08-09 | Stop reason: HOSPADM

## 2022-08-06 RX ORDER — DIAZEPAM 10 MG/2ML
5 INJECTION INTRAMUSCULAR
Status: COMPLETED | OUTPATIENT
Start: 2022-08-06 | End: 2022-08-06

## 2022-08-06 RX ORDER — ATORVASTATIN CALCIUM 40 MG/1
40 TABLET, FILM COATED ORAL NIGHTLY
Status: DISCONTINUED | OUTPATIENT
Start: 2022-08-06 | End: 2022-08-09 | Stop reason: HOSPADM

## 2022-08-06 RX ORDER — BACLOFEN 10 MG/1
10 TABLET ORAL 3 TIMES DAILY
Status: DISCONTINUED | OUTPATIENT
Start: 2022-08-06 | End: 2022-08-09 | Stop reason: HOSPADM

## 2022-08-06 RX ORDER — CALCIUM GLUCONATE 20 MG/ML
1 INJECTION, SOLUTION INTRAVENOUS
Status: COMPLETED | OUTPATIENT
Start: 2022-08-06 | End: 2022-08-06

## 2022-08-06 RX ORDER — IBUPROFEN 200 MG
16 TABLET ORAL
Status: DISCONTINUED | OUTPATIENT
Start: 2022-08-06 | End: 2022-08-09 | Stop reason: HOSPADM

## 2022-08-06 RX ORDER — ONDANSETRON 2 MG/ML
4 INJECTION INTRAMUSCULAR; INTRAVENOUS EVERY 8 HOURS PRN
Status: DISCONTINUED | OUTPATIENT
Start: 2022-08-06 | End: 2022-08-09 | Stop reason: HOSPADM

## 2022-08-06 RX ORDER — CLOTRIMAZOLE 10 MG/1
10 LOZENGE ORAL; TOPICAL
Status: DISCONTINUED | OUTPATIENT
Start: 2022-08-06 | End: 2022-08-09 | Stop reason: HOSPADM

## 2022-08-06 RX ORDER — KETOROLAC TROMETHAMINE 30 MG/ML
10 INJECTION, SOLUTION INTRAMUSCULAR; INTRAVENOUS
Status: COMPLETED | OUTPATIENT
Start: 2022-08-06 | End: 2022-08-06

## 2022-08-06 RX ORDER — POLYETHYLENE GLYCOL 3350 17 G/17G
17 POWDER, FOR SOLUTION ORAL DAILY
Status: DISCONTINUED | OUTPATIENT
Start: 2022-08-06 | End: 2022-08-09 | Stop reason: HOSPADM

## 2022-08-06 RX ORDER — DULOXETIN HYDROCHLORIDE 30 MG/1
30 CAPSULE, DELAYED RELEASE ORAL DAILY
Status: DISCONTINUED | OUTPATIENT
Start: 2022-08-06 | End: 2022-08-09 | Stop reason: HOSPADM

## 2022-08-06 RX ORDER — SODIUM CHLORIDE 9 MG/ML
INJECTION, SOLUTION INTRAVENOUS
Status: COMPLETED | OUTPATIENT
Start: 2022-08-06 | End: 2022-08-06

## 2022-08-06 RX ORDER — NALOXONE HCL 0.4 MG/ML
0.02 VIAL (ML) INJECTION
Status: DISCONTINUED | OUTPATIENT
Start: 2022-08-06 | End: 2022-08-09 | Stop reason: HOSPADM

## 2022-08-06 RX ORDER — TALC
6 POWDER (GRAM) TOPICAL NIGHTLY PRN
Status: DISCONTINUED | OUTPATIENT
Start: 2022-08-06 | End: 2022-08-09 | Stop reason: HOSPADM

## 2022-08-06 RX ORDER — GLUCAGON 1 MG
1 KIT INJECTION
Status: DISCONTINUED | OUTPATIENT
Start: 2022-08-06 | End: 2022-08-09 | Stop reason: HOSPADM

## 2022-08-06 RX ORDER — ATORVASTATIN CALCIUM 40 MG/1
80 TABLET, FILM COATED ORAL DAILY
Status: DISCONTINUED | OUTPATIENT
Start: 2022-08-06 | End: 2022-08-06

## 2022-08-06 RX ORDER — LOSARTAN POTASSIUM 25 MG/1
100 TABLET ORAL DAILY
Status: DISCONTINUED | OUTPATIENT
Start: 2022-08-06 | End: 2022-08-09 | Stop reason: HOSPADM

## 2022-08-06 RX ORDER — SODIUM CHLORIDE 0.9 % (FLUSH) 0.9 %
10 SYRINGE (ML) INJECTION EVERY 12 HOURS PRN
Status: DISCONTINUED | OUTPATIENT
Start: 2022-08-06 | End: 2022-08-09 | Stop reason: HOSPADM

## 2022-08-06 RX ORDER — INSULIN ASPART 100 [IU]/ML
1-10 INJECTION, SOLUTION INTRAVENOUS; SUBCUTANEOUS
Status: DISCONTINUED | OUTPATIENT
Start: 2022-08-06 | End: 2022-08-09 | Stop reason: HOSPADM

## 2022-08-06 RX ORDER — IBUPROFEN 200 MG
24 TABLET ORAL
Status: DISCONTINUED | OUTPATIENT
Start: 2022-08-06 | End: 2022-08-09 | Stop reason: HOSPADM

## 2022-08-06 RX ORDER — ENOXAPARIN SODIUM 100 MG/ML
40 INJECTION SUBCUTANEOUS EVERY 24 HOURS
Status: DISCONTINUED | OUTPATIENT
Start: 2022-08-06 | End: 2022-08-09 | Stop reason: HOSPADM

## 2022-08-06 RX ORDER — ATORVASTATIN CALCIUM 40 MG/1
40 TABLET, FILM COATED ORAL DAILY
Status: DISCONTINUED | OUTPATIENT
Start: 2022-08-07 | End: 2022-08-06

## 2022-08-06 RX ORDER — SIMETHICONE 125 MG
125 TABLET,CHEWABLE ORAL EVERY 6 HOURS PRN
Status: DISCONTINUED | OUTPATIENT
Start: 2022-08-06 | End: 2022-08-09 | Stop reason: HOSPADM

## 2022-08-06 RX ADMIN — BACLOFEN 10 MG: 10 TABLET ORAL at 09:08

## 2022-08-06 RX ADMIN — LOSARTAN POTASSIUM 100 MG: 25 TABLET, FILM COATED ORAL at 10:08

## 2022-08-06 RX ADMIN — PANTOPRAZOLE SODIUM 40 MG: 40 TABLET, DELAYED RELEASE ORAL at 10:08

## 2022-08-06 RX ADMIN — CLOTRIMAZOLE 10 MG: 10 LOZENGE ORAL; TOPICAL at 10:08

## 2022-08-06 RX ADMIN — ENOXAPARIN SODIUM 40 MG: 100 INJECTION SUBCUTANEOUS at 04:08

## 2022-08-06 RX ADMIN — HYPROMELLOSE 2910 2 DROP: 5 SOLUTION OPHTHALMIC at 09:08

## 2022-08-06 RX ADMIN — BACLOFEN 10 MG: 10 TABLET ORAL at 03:08

## 2022-08-06 RX ADMIN — CALCIUM GLUCONATE 1 G: 20 INJECTION, SOLUTION INTRAVENOUS at 05:08

## 2022-08-06 RX ADMIN — SODIUM CHLORIDE: 0.9 INJECTION, SOLUTION INTRAVENOUS at 05:08

## 2022-08-06 RX ADMIN — DULOXETINE 30 MG: 30 CAPSULE, DELAYED RELEASE ORAL at 10:08

## 2022-08-06 RX ADMIN — KETOROLAC TROMETHAMINE 10 MG: 30 INJECTION, SOLUTION INTRAMUSCULAR; INTRAVENOUS at 05:08

## 2022-08-06 RX ADMIN — CLOTRIMAZOLE 10 MG: 10 LOZENGE ORAL; TOPICAL at 09:08

## 2022-08-06 RX ADMIN — CALCIUM GLUCONATE 1 G: 20 INJECTION, SOLUTION INTRAVENOUS at 04:08

## 2022-08-06 RX ADMIN — MELATONIN TAB 3 MG 6 MG: 3 TAB at 09:08

## 2022-08-06 RX ADMIN — ATORVASTATIN CALCIUM 40 MG: 40 TABLET, FILM COATED ORAL at 09:08

## 2022-08-06 RX ADMIN — CALCIUM CARBONATE (ANTACID) CHEW TAB 500 MG 1000 MG: 500 CHEW TAB at 03:08

## 2022-08-06 RX ADMIN — CALCIUM CARBONATE (ANTACID) CHEW TAB 500 MG 1000 MG: 500 CHEW TAB at 09:08

## 2022-08-06 RX ADMIN — ACETAMINOPHEN 650 MG: 325 TABLET ORAL at 10:08

## 2022-08-06 RX ADMIN — CALCIUM GLUCONATE 1 G: 20 INJECTION, SOLUTION INTRAVENOUS at 07:08

## 2022-08-06 RX ADMIN — AMLODIPINE BESYLATE 2.5 MG: 2.5 TABLET ORAL at 10:08

## 2022-08-06 RX ADMIN — CLOTRIMAZOLE 10 MG: 10 LOZENGE ORAL; TOPICAL at 01:08

## 2022-08-06 RX ADMIN — DIAZEPAM 5 MG: 5 INJECTION, SOLUTION INTRAMUSCULAR; INTRAVENOUS at 05:08

## 2022-08-06 RX ADMIN — CLOTRIMAZOLE 10 MG: 10 LOZENGE ORAL; TOPICAL at 05:08

## 2022-08-06 NOTE — CONSULTS
[facility]  Neurology  Consult Note    Patient Name: Erica Masterson  MRN: [id number]  Admission Date: 8/6/2022  Hospital Length of Stay: 0 days  Code Status: Full Code   Attending Provider: Alberto Alvarez MD   Consulting Provider: Beatriz Lynn NP  Primary Care Physician: Narayan Myers MD  Principal Problem: Pain and Weakness  Consults  Subjective:     Chief Complaint:  Difficulty Ambulating with numbness and tingling intermittent for months     HPI per EMR: 76-year-old female presents with pain all over and generalized weakness.  She reports that she is now unable to walk.  She has been seen by multiple different specialties with primary focus and gastroenterology.  She reports diffuse abdominal pain that is currently absent.  She recently decreased her Synthroid dosage.  She admits to morning stiffness.  No history of polymyalgia rheumatica or polymyositis.  Past medical history is significant for anxiety, hypertension, hyperlipidemia and thyroid cancer status post thyroidectomy.    HPI per Neurology: Patient seen while sitting up in bed reporting that she usually is in an exercise group that bicycled every M,W,F. She states that over the last year since she has had her thyroid (September 2021) removed she has had worsening burning and weakness in her legs. She was on gabapentin then changed to cymbalta for this. She reports that last PM was the first time that she had difficulty ambulating due to it. She reports that when she takes the calcium given to her in the hospital her legs improve for about 30 mins then start to hurt again.  Patient TSH <0.0.14 on levothyroxine, Ca 7.9, Alb 3.3, .     Past Medical History:   Diagnosis Date    Adenomatous polyp of colon 3/26/2012    Allergy     Anxiety     Cancer     Cataract     Colon polyp     Degenerative arthritis of knee 04/03/2012    Diverticulosis     Encounter for blood transfusion     GERD (gastroesophageal reflux disease)      History of thyroid cancer 2021    Hyperlipidemia     Hypertension     Lateral meniscal tear 5/20/2013    Multinodular goiter 03/26/2012    Myopathy, unspecified 01/18/2010    Tuberculosis        Past Surgical History:   Procedure Laterality Date    BREAST BIOPSY Left 2015    benign    CHOLECYSTECTOMY      COLONOSCOPY  12/2/15    Dr. Britton, multiple polyps, recheck five years-three years if more than 2 polyps are adenomatous    COLONOSCOPY N/A 12/2/2015    COLONOSCOPY N/A 3/20/2019    Procedure: COLONOSCOPY;  Surgeon: Jose Britton MD;  Location: [facility];  Service: Endoscopy;  Laterality: N/A;    COLONOSCOPY N/A 5/20/2020    Dr. Britton; internal hemorrhoids; diverticulosis; polyps removed; repeat in 3 years    CYSTOSCOPY N/A 8/26/2020    Procedure: CYSTOSCOPY;  Surgeon: Charlotte Walters Jr., MD;  Location: [facility];  Service: Urology;  Laterality: N/A;    DISSECTION OF NECK Left 9/13/2021    Procedure: DISSECTION, NECK;  Surgeon: Ryan Torres MD;  Location: [address];  Service: ENT;  Laterality: Left;    ESOPHAGOGASTRODUODENOSCOPY N/A 5/20/2020    Dr. Britton; small hiatal hernia; gastritis; 8 gastric polyps removed    ESOPHAGOGASTRODUODENOSCOPY N/A 4/1/2022    Procedure: EGD (ESOPHAGOGASTRODUODENOSCOPY);  Surgeon: Jose Britton MD;  Location: [facility];  Service: Endoscopy;  Laterality: N/A;    FOOT SURGERY      HYSTERECTOMY      TVH/BSO > 20 years    INTRALUMINAL GASTROINTESTINAL TRACT IMAGING VIA CAPSULE N/A 6/4/2020    Procedure: IMAGING PROCEDURE, GI TRACT, INTRALUMINAL, VIA CAPSULE;  Surgeon: Jose Britton MD;  Location: [facility];  Service: Endoscopy;  Laterality: N/A;    KNEE SURGERY  06/06/2013    right knee tear Dr Iverson     LAPAROSCOPIC CHOLECYSTECTOMY N/A 9/17/2020    Procedure: CHOLECYSTECTOMY, LAPAROSCOPIC;  Surgeon: Forrest Veronica MD;  Location: [facility];  Service: General;  Laterality: N/A;    LUNG LOBECTOMY  1966    right middle lobectomy, due  to Tb    OOPHORECTOMY      SHOULDER SURGERY      francis     THYROIDECTOMY Bilateral 5/19/2021    Procedure: THYROIDECTOMY;  Surgeon: Jamia Amezquita MD;  Location: [address];  Service: General;  Laterality: Bilateral;    THYROIDECTOMY Bilateral 9/13/2021    Procedure: THYROIDECTOMY WITH INTRA-OP PTH;  Surgeon: Ryan Torres MD;  Location: [address];  Service: ENT;  Laterality: Bilateral;  NIM tube  Intraop PTH       Review of patient's allergies indicates:  No Known Allergies    Current Neurological Medications: see below (cymbalta,rosuvastatin)    No current facility-administered medications on file prior to encounter.     Current Outpatient Medications on File Prior to Encounter   Medication Sig    amLODIPine (NORVASC) 2.5 MG tablet Take 1 tablet by mouth once daily    baclofen (LIORESAL) 10 MG tablet Take 1 tablet (10 mg total) by mouth 3 (three) times daily. Start w. Half a tab first 7 days    blood sugar diagnostic (BLOOD GLUCOSE TEST) Strp True Metrix glucose strips check once daily    blood-glucose meter kit True Metrix glucometer check glucose once daily    calcium carbonate (TUMS) 200 mg calcium (500 mg) chewable tablet Chew and swallow 2 tablets (1,000 mg total) by mouth 3 (three) times daily. for 14 days    carboxymethylcellulose sodium (REFRESH OPHT) Apply 1 drop to eye daily as needed.    clotrimazole (MYCELEX) 10 mg sol Take 1 tablet (10 mg total) by mouth 5 (five) times daily. for 10 days    conjugated estrogens (PREMARIN) vaginal cream Place 0.5 g vaginally once daily AND 0.5 g twice a week. (Patient taking differently: Place 0.5 g vaginally once daily AND 0.5 g twice a week. Saturdays and tuesdays)    DULoxetine (CYMBALTA) 30 MG capsule Take 1 capsule (30 mg total) by mouth once daily.    ergocalciferol (ERGOCALCIFEROL) 50,000 unit Cap Take 1 capsule (50,000 Units total) by mouth every 30 days.    erythromycin (ROMYCIN) ophthalmic ointment Place into the left eye  every 4 (four) hours. Place a 1/2 inch ribbon of ointment into the lower eyelid. for 5 days    gabapentin (NEURONTIN) 300 MG capsule Take 1 capsule (300 mg total) by mouth 2 (two) times daily.    lansoprazole (PREVACID) 30 MG capsule Take 30 mg by mouth once daily.    levothyroxine (SYNTHROID) 88 MCG tablet Take 1 tablet (88 mcg total) by mouth before breakfast.    losartan (COZAAR) 100 MG tablet Take 1 tablet by mouth once daily    nystatin (MYCOSTATIN) 100,000 unit/mL suspension Take 4 mLs (400,000 Units total) by mouth 4 (four) times daily. for 10 days    pantoprazole (PROTONIX) 40 MG tablet Take 1 tablet (40 mg total) by mouth once daily.    polyethylene glycol (GLYCOLAX) 17 gram PwPk Take 17 g by mouth once daily.    rosuvastatin (CRESTOR) 20 MG tablet Take 0.5 tablets (10 mg total) by mouth every evening.    simethicone (MYLICON) 125 MG chewable tablet Take 125 mg by mouth every 6 (six) hours as needed for Flatulence.    UNABLE TO FIND I B guard bid      Family History     Problem Relation (Age of Onset)    Alcohol abuse Son    Alzheimer's disease Maternal Uncle, Sister    Breast cancer Sister (60)    Cancer Mother, Brother, Maternal Grandmother, Brother    Colon cancer Other    Dementia Sister    Diabetes Son, Maternal Aunt    Emphysema Father    Hyperlipidemia Mother    Hypertension Mother, Father, Son    No Known Problems Daughter    Obesity Paternal Uncle    Prostate cancer Other        Tobacco Use    Smoking status: Never Smoker    Smokeless tobacco: Never Used   Substance and Sexual Activity    Alcohol use: Not Currently     Comment: stopped 2019    Drug use: No    Sexual activity: Not Currently     Review of Systems   Musculoskeletal: Positive for gait problem. Myalgias: intermittent BLE.   Neurological: Positive for weakness (BLE intermittent) and numbness (described as burning).     Objective:     Vital Signs (Most Recent):  Temp: 98.8 °F (37.1 °C) (08/06/22 1046)  Pulse: 68 (08/06/22  1046)  Resp: 14 (08/06/22 1046)  BP: (!) 155/65 (08/06/22 1046)  SpO2: 99 % (08/06/22 1046) Vital Signs (24h Range):  Temp:  [98.8 °F (37.1 °C)-98.9 °F (37.2 °C)] 98.8 °F (37.1 °C)  Pulse:  [] 68  Resp:  [14-22] 14  SpO2:  [97 %-100 %] 99 %  BP: (118-159)/(58-83) 155/65     Weight: 67.4 kg (148 lb 9.4 oz)  Body mass index is 23.98 kg/m².    Physical Exam  HENT:      Head: Normocephalic and atraumatic.      Mouth/Throat:      Mouth: Mucous membranes are moist.   Eyes:      Extraocular Movements: Extraocular movements intact.      Pupils: Pupils are equal, round, and reactive to light.   Neurological:      Mental Status: She is alert.      Coordination: Finger-Nose-Finger Test normal.      Deep Tendon Reflexes:      Reflex Scores:       Tricep reflexes are 2+ on the right side and 2+ on the left side.       Bicep reflexes are 2+ on the right side and 2+ on the left side.       Brachioradialis reflexes are 2+ on the right side and 2+ on the left side.        NEUROLOGICAL EXAMINATION:     CRANIAL NERVES     CN III, IV, VI   Pupils are equal, round, and reactive to light.    MOTOR EXAM   Muscle bulk: normal  Overall muscle tone: normal  Right arm tone: normal  Left arm tone: normal  Right arm pronator drift: absent  Left arm pronator drift: absent  Right leg tone: normal  Left leg tone: normal    Strength   Right anterior tibial: 4/5  Right posterior tibial: 4/5    REFLEXES     Reflexes   Right brachioradialis: 2+  Left brachioradialis: 2+  Right biceps: 2+  Left biceps: 2+  Right triceps: 2+  Left triceps: 2+    SENSORY EXAM   Light touch normal.     GAIT AND COORDINATION      Coordination   Finger to nose coordination: normal       Defer for safety       Significant Labs:   Recent Lab Results  (Last 5 results in the past 24 hours)      08/06/22  1247   08/06/22  0759   08/06/22  0629   08/06/22  0505   08/06/22  0503        Albumin         3.3       Alkaline Phosphatase         53       ALT         15       Anion  Gap         12       Appearance, UA   Clear             AST         14       Bacteria, UA   Occasional             Baso #         0.02       Basophil %         0.4       Bilirubin (UA)   Negative             BILIRUBIN TOTAL         0.6  Comment: For infants and newborns, interpretation of results should be based  on gestational age, weight and in agreement with clinical  observations.    Premature Infant recommended reference ranges:  Up to 24 hours.............<8.0 mg/dL  Up to 48 hours............<12.0 mg/dL  3-5 days..................<15.0 mg/dL  6-29 days.................<15.0 mg/dL         BUN         14       Calcium 8.0         7.9       Ionized Calcium     0.96           Chloride         104       CO2         25       Color, UA   Yellow             CPK         206       Creatinine         1.1       CRP         2.5       Differential Method         Automated       eGFR         51       Eos #         0.1       Eosinophil %         1.8       Free T4         1.46       Glucose         112       Glucose, UA   Negative             Gran # (ANC)         3.0       Gran %         52.1       Hematocrit         34.4       Hemoglobin         11.5       Immature Grans (Abs)         0.01  Comment: Mild elevation in immature granulocytes is non specific and   can be seen in a variety of conditions including stress response,   acute inflammation, trauma and pregnancy. Correlation with other   laboratory and clinical findings is essential.         Immature Granulocytes         0.2       Ketones, UA   Negative             Leukocytes, UA   Negative             Lymph #         2.1       Lymph %         36.3       Magnesium         1.9       MCH         29.0       MCHC         33.4       MCV         87       Microscopic Comment   SEE COMMENT  Comment: Other formed elements not mentioned in the report are not   present in the microscopic examination.                Mono #         0.5       Mono %         9.2       MPV          8.9       NITRITE UA   Negative             nRBC         0       Occult Blood UA   1+             pH, UA   6.0             Phosphorus         3.7       Platelets         257       Potassium         3.7       PROTEIN TOTAL         6.8       Protein, UA   Negative  Comment: Recommend a 24 hour urine protein or a urine   protein/creatinine ratio if globulin induced proteinuria is  clinically suspected.               RBC         3.97       RBC, UA   3             RDW         12.8       SARS-CoV-2 RNA, Amplification, Qual       Negative  Comment: This test utilizes isothermal nucleic acid amplification   technology to detect the SARS-CoV-2 RdRp nucleic acid segment.   The analytical sensitivity (limit of detection) is 125 genome   equivalents/mL.     A POSITIVE result implies infection with the SARS-CoV-2 virus;  the patient is presumed to be contagious.    A NEGATIVE result means that SARS-CoV-2 nucleic acids are not  present above the limit of detection. A NEGATIVE result should be   treated as presumptive. It does not rule out the possibility of   COVID-19 and should not be the sole basis for treatment decisions.   If COVID-19 is strongly suspected based on clinical and exposure   history, re-testing using an alternate molecular assay should be   considered.       This test is only for use under the Food and Drug   Administration s Emergency Use Authorization (EUA).   Commercial kits are provided by [employer].   Performance characteristics of the EUA have been independently  verified by [facility] Department of  Pathology and Laboratory Medicine.   _________________________________________________________________  The ID NOW COVID-19 Letter of Authorization, along with the   authorized Fact Sheet for Healthcare Providers, the authorized Fact  Sheet for Patients, and authorized labeling are available on the FDA   website:  www.fda.gov/MedicalDevices/Safety/EmergencySituations/vej472381.htm            Sodium         141       Specific Gravity, UA   <=1.005             Specimen UA   Urine, Clean Catch             Troponin I 0.010  Comment: The reference interval for Troponin I represents the 99th percentile   cutoff   for our facility and is consistent with 3rd generation assay   performance.                 TSH         <0.010       UROBILINOGEN UA   Negative             WBC         5.67                          All pertinent lab results from the past 24 hours have been reviewed.    Significant Imaging: None on file for c/o numbness and tingling in BLE    Assessment and Plan:      Generalized weakness of Bilateral lower extremities  Replete electrolytes PRN defer to medicine team  Corrected calcium 8.5   MRI lumbar spine without contrast   PT/OT evaluate and treat    Paresthesia of BLE  Request EMG reports (pt had done locally not with [facility]patient)  Continue Cymbalta 30 mg daily    Hypocalcemia  Replete  Defer to medicine team    PAD  Continue Atorvastatin, enoxaparin  Start asa 81 mg daily        VTE Risk Mitigation (From admission, onward)         Ordered     enoxaparin injection 40 mg  Daily         08/06/22 1003     IP VTE HIGH RISK PATIENT  Once         08/06/22 1003     Place sequential compression device  Until discontinued         08/06/22 1003                Thank you for your consult. I will follow-up with patient. Please contact us if you have any additional questions.        Beatriz Lynn NP  Neurology  [facility]

## 2022-08-06 NOTE — ED NOTES
"Patient reports that pain and spasms have improved but her "Hands are tight and feel numb." Dr. Young is aware.   "

## 2022-08-06 NOTE — SUBJECTIVE & OBJECTIVE
Past Medical History:   Diagnosis Date    Adenomatous polyp of colon 3/26/2012    Allergy     Anxiety     Cancer     Cataract     Colon polyp     Degenerative arthritis of knee 04/03/2012    Diverticulosis     Encounter for blood transfusion     GERD (gastroesophageal reflux disease)     History of thyroid cancer 2021    Hyperlipidemia     Hypertension     Lateral meniscal tear 5/20/2013    Multinodular goiter 03/26/2012    Myopathy, unspecified 01/18/2010    Tuberculosis        Past Surgical History:   Procedure Laterality Date    BREAST BIOPSY Left 2015    benign    CHOLECYSTECTOMY      COLONOSCOPY  12/2/15    Dr. Britton, multiple polyps, recheck five years-three years if more than 2 polyps are adenomatous    COLONOSCOPY N/A 12/2/2015    COLONOSCOPY N/A 3/20/2019    Procedure: COLONOSCOPY;  Surgeon: Jose Britton MD;  Location: [facility];  Service: Endoscopy;  Laterality: N/A;    COLONOSCOPY N/A 5/20/2020    Dr. Britton; internal hemorrhoids; diverticulosis; polyps removed; repeat in 3 years    CYSTOSCOPY N/A 8/26/2020    Procedure: CYSTOSCOPY;  Surgeon: Charlotte Walters Jr., MD;  Location: [facility] OR;  Service: Urology;  Laterality: N/A;    DISSECTION OF NECK Left 9/13/2021    Procedure: DISSECTION, NECK;  Surgeon: Ryan Torres MD;  Location: [facility] OR [address] [place] [address];  Service: ENT;  Laterality: Left;    ESOPHAGOGASTRODUODENOSCOPY N/A 5/20/2020    Dr. Britton; small hiatal hernia; gastritis; 8 gastric polyps removed    ESOPHAGOGASTRODUODENOSCOPY N/A 4/1/2022    Procedure: EGD (ESOPHAGOGASTRODUODENOSCOPY);  Surgeon: Jose Britton MD;  Location: [facility] ENDO;  Service: Endoscopy;  Laterality: N/A;    FOOT SURGERY      HYSTERECTOMY      TVH/BSO > 20 years    INTRALUMINAL GASTROINTESTINAL TRACT IMAGING VIA CAPSULE N/A 6/4/2020    Procedure: IMAGING PROCEDURE, GI TRACT, INTRALUMINAL, VIA CAPSULE;  Surgeon: Jose Britton MD;  Location: [facility] ENDO;  Service: Endoscopy;  Laterality: N/A;    KNEE SURGERY  06/06/2013     right knee tear Dr Iverson     LAPAROSCOPIC CHOLECYSTECTOMY N/A 9/17/2020    Procedure: CHOLECYSTECTOMY, LAPAROSCOPIC;  Surgeon: Forrest Veronica MD;  Location: [facility];  Service: General;  Laterality: N/A;    LUNG LOBECTOMY  1966    right middle lobectomy, due to Tb    OOPHORECTOMY      SHOULDER SURGERY      francis     THYROIDECTOMY Bilateral 5/19/2021    Procedure: THYROIDECTOMY;  Surgeon: Jamia Amezquita MD;  Location: [facility] OR [address] [place] [address];  Service: General;  Laterality: Bilateral;    THYROIDECTOMY Bilateral 9/13/2021    Procedure: THYROIDECTOMY WITH INTRA-OP PTH;  Surgeon: Ryan Torres MD;  [facility];  Service: ENT;  Laterality: Bilateral;  NIM tube  Intraop PTH       Review of patient's allergies indicates:  No Known Allergies    No current facility-administered medications on file prior to encounter.     Current Outpatient Medications on File Prior to Encounter   Medication Sig    amLODIPine (NORVASC) 2.5 MG tablet Take 1 tablet by mouth once daily    baclofen (LIORESAL) 10 MG tablet Take 1 tablet (10 mg total) by mouth 3 (three) times daily. Start w. Half a tab first 7 days    blood sugar diagnostic (BLOOD GLUCOSE TEST) Strp True Metrix glucose strips check once daily    blood-glucose meter kit True Metrix glucometer check glucose once daily    calcium carbonate (TUMS) 200 mg calcium (500 mg) chewable tablet Chew and swallow 2 tablets (1,000 mg total) by mouth 3 (three) times daily. for 14 days    carboxymethylcellulose sodium (REFRESH OPHT) Apply 1 drop to eye daily as needed.    clotrimazole (MYCELEX) 10 mg sol Take 1 tablet (10 mg total) by mouth 5 (five) times daily. for 10 days    conjugated estrogens (PREMARIN) vaginal cream Place 0.5 g vaginally once daily AND 0.5 g twice a week. (Patient taking differently: Place 0.5 g vaginally once daily AND 0.5 g twice a week. Saturdays and tuesdays)    DULoxetine (CYMBALTA) 30 MG capsule Take 1 capsule (30 mg total) by mouth once daily.     ergocalciferol (ERGOCALCIFEROL) 50,000 unit Cap Take 1 capsule (50,000 Units total) by mouth every 30 days.    erythromycin (ROMYCIN) ophthalmic ointment Place into the left eye every 4 (four) hours. Place a 1/2 inch ribbon of ointment into the lower eyelid. for 5 days    gabapentin (NEURONTIN) 300 MG capsule Take 1 capsule (300 mg total) by mouth 2 (two) times daily.    lansoprazole (PREVACID) 30 MG capsule Take 30 mg by mouth once daily.    levothyroxine (SYNTHROID) 88 MCG tablet Take 1 tablet (88 mcg total) by mouth before breakfast.    losartan (COZAAR) 100 MG tablet Take 1 tablet by mouth once daily    nystatin (MYCOSTATIN) 100,000 unit/mL suspension Take 4 mLs (400,000 Units total) by mouth 4 (four) times daily. for 10 days    pantoprazole (PROTONIX) 40 MG tablet Take 1 tablet (40 mg total) by mouth once daily.    polyethylene glycol (GLYCOLAX) 17 gram PwPk Take 17 g by mouth once daily.    rosuvastatin (CRESTOR) 20 MG tablet Take 0.5 tablets (10 mg total) by mouth every evening.    simethicone (MYLICON) 125 MG chewable tablet Take 125 mg by mouth every 6 (six) hours as needed for Flatulence.    UNABLE TO FIND I B guard bid     Family History       Problem Relation (Age of Onset)    Alcohol abuse Son    Alzheimer's disease Maternal Uncle, Sister    Breast cancer Sister (60)    Cancer Mother, Brother, Maternal Grandmother, Brother    Colon cancer Other    Dementia Sister    Diabetes Son, Maternal Aunt    Emphysema Father    Hyperlipidemia Mother    Hypertension Mother, Father, Son    No Known Problems Daughter    Obesity Paternal Uncle    Prostate cancer Other          Tobacco Use    Smoking status: Never Smoker    Smokeless tobacco: Never Used   Substance and Sexual Activity    Alcohol use: Not Currently     Comment: stopped 2019    Drug use: No    Sexual activity: Not Currently     Review of Systems   Constitutional:  Positive for fatigue. Negative for appetite change, chills and fever.   HENT:  Negative for  congestion.    Eyes:  Negative for photophobia and visual disturbance.   Respiratory:  Negative for cough, shortness of breath and wheezing.    Cardiovascular:  Negative for chest pain and palpitations.   Gastrointestinal:  Negative for abdominal pain, diarrhea, nausea and vomiting.   Genitourinary:  Negative for dysuria and hematuria.   Musculoskeletal:  Negative for neck pain and neck stiffness.   Skin:  Negative for pallor and rash.   Neurological:  Positive for dizziness and numbness. Negative for syncope and weakness.   Psychiatric/Behavioral:  Negative for agitation and confusion.    All other systems reviewed and are negative.  Objective:     Vital Signs (Most Recent):  Temp: 98.1 °F (36.7 °C) (08/06/22 1528)  Pulse: 71 (08/06/22 1528)  Resp: 18 (08/06/22 1528)  BP: 138/63 (08/06/22 1528)  SpO2: 98 % (08/06/22 1528) Vital Signs (24h Range):  Temp:  [98.1 °F (36.7 °C)-98.9 °F (37.2 °C)] 98.1 °F (36.7 °C)  Pulse:  [] 71  Resp:  [14-22] 18  SpO2:  [97 %-100 %] 98 %  BP: (118-159)/(58-83) 138/63     Weight: 67.4 kg (148 lb 9.4 oz)  Body mass index is 23.98 kg/m².    Physical Exam  Constitutional:       General: She is not in acute distress.     Appearance: She is well-developed.   HENT:      Head: Normocephalic and atraumatic.   Eyes:      Conjunctiva/sclera: Conjunctivae normal.      Pupils: Pupils are equal, round, and reactive to light.   Cardiovascular:      Rate and Rhythm: Normal rate and regular rhythm.      Heart sounds: Normal heart sounds.   Pulmonary:      Effort: Pulmonary effort is normal. No respiratory distress.      Breath sounds: Normal breath sounds.   Abdominal:      General: Bowel sounds are normal. There is no distension.      Palpations: Abdomen is soft.   Musculoskeletal:         General: Tenderness (left shoulder) present. Normal range of motion.      Cervical back: Normal range of motion and neck supple.   Skin:     General: Skin is warm and dry.   Neurological:      General: No  focal deficit present.      Mental Status: She is alert and oriented to person, place, and time.   Psychiatric:         Mood and Affect: Mood normal.         Behavior: Behavior normal.         Thought Content: Thought content normal.         CRANIAL NERVES     CN III, IV, VI   Pupils are equal, round, and reactive to light.     Significant Labs: All pertinent labs within the past 24 hours have been reviewed.  CBC:   Recent Labs   Lab 08/04/22  1730 08/06/22  0503   WBC 6.42 5.67   HGB 12.5 11.5*   HCT 38.7 34.4*    257     CMP:   Recent Labs   Lab 08/04/22  1729 08/06/22  0503 08/06/22  1247    141  --    K 4.4 3.7  --     104  --    CO2 26 25  --     112*  --    BUN 19 14  --    CREATININE 1.0 1.1  --    CALCIUM 8.2* 7.9* 8.0*   PROT 7.6 6.8  --    ALBUMIN 3.8 3.3*  --    BILITOT 0.7 0.6  --    ALKPHOS 57 53*  --    AST 17 14  --    ALT 15 15  --    ANIONGAP 11 12  --        Significant Imaging: I have reviewed all pertinent imaging results/findings within the past 24 hours.

## 2022-08-06 NOTE — PLAN OF CARE
Plan of care reviewed with patient. Patient verbalized complete understanding. Patient c/o generalized weakness and tingling. Pending MRI Lumbar and PT eval. Calcium Carbonate TID. Neuro consult complete. All fall precautions maintained. Bed in lowest position, locked, call light within reach. Side rails up x's 2. Slip resistant socks maintained.

## 2022-08-06 NOTE — ASSESSMENT & PLAN NOTE
Patient's FSGs are controlled on current medication regimen.  Last A1c reviewed-   Lab Results   Component Value Date    HGBA1C 5.8 (H) 04/06/2022     Most recent fingerstick glucose reviewed- No results for input(s): POCTGLUCOSE in the last 24 hours.  Current correctional scale  Medium  Maintain anti-hyperglycemic dose as follows-   Antihyperglycemics (From admission, onward)            Start     Stop Route Frequency Ordered    08/06/22 1021  insulin aspart U-100 pen 1-10 Units         -- SubQ Before meals & nightly PRN 08/06/22 1003        Hold Oral hypoglycemics while patient is in the hospital.

## 2022-08-06 NOTE — ED NOTES
"Erica Masterson presents to the ED with c/o pain all over that is waking her up at night. Patient reports this has been going on for months but is worse this morning. Patient reports that she "Curves over." and has spasms. HAN VALENCIA  Verified patient's name and date of birth.     "

## 2022-08-06 NOTE — ED NOTES
"When asked, reports generally weak x months but "more so last PM". No obvious s/s of distress (cramping or tremors).  "

## 2022-08-06 NOTE — H&P
[facility] Medicine  History & Physical    Patient Name: Erica Masterson  MRN: [id number]  Patient Class: OP- Observation  Admission Date: 8/6/2022  Attending Physician: Alberto Alvarez MD   Primary Care Provider: Narayan Myers MD         Patient information was obtained from patient, past medical records and ER records.     Subjective:     Principal Problem:Hypocalcemia    Chief Complaint:   Chief Complaint   Patient presents with    Spasms     All over. Has had issues in the past, but never this bad to the point where they feel as if they are going to pass out. Decreased thyroid medication this week.        HPI: Erica Masterson is a 77 y/o F with a past medical history significant for HTN, HLD, anxiety, and thyroid cancer s/p thyroidectomy who presented to the ED after finding herself unable to ambulate early this morning.  Reports that she has had intermittent episodes of N/T to her extremities and episodes of difficutly ambulating that usually resolve spontaneously before she can get to the ED to be assessed.  When such episodes have occurred, she reports that she has been diagnosed with anxiety as no other reason has been determined. She has had pain and decreased ROM to the left shoulder which pt states is being treated by orthopedics.  Symptoms seemed to be worse today than usual.  Denies recent fever, chest pain, SOB, dysuria, difficulty with speech or swallowing or any other symptoms. Reports decreasing her synthroid dose a few days ago at the direction of her endocrinologist, but no other medication changes.  ED workup is significant for hypocalcemia.  She is placed in observation under the service of hospital medicine for continued monitoring and treatment.          Past Medical History:   Diagnosis Date    Adenomatous polyp of colon 3/26/2012    Allergy     Anxiety     Cancer     Cataract     Colon polyp     Degenerative arthritis of knee 04/03/2012     Diverticulosis     Encounter for blood transfusion     GERD (gastroesophageal reflux disease)     History of thyroid cancer 2021    Hyperlipidemia     Hypertension     Lateral meniscal tear 5/20/2013    Multinodular goiter 03/26/2012    Myopathy, unspecified 01/18/2010    Tuberculosis        Past Surgical History:   Procedure Laterality Date    BREAST BIOPSY Left 2015    benign    CHOLECYSTECTOMY      COLONOSCOPY  12/2/15    Dr. Britton, multiple polyps, recheck five years-three years if more than 2 polyps are adenomatous    COLONOSCOPY N/A 12/2/2015    COLONOSCOPY N/A 3/20/2019    Procedure: COLONOSCOPY;  Surgeon: Jose Britton MD;  Location: [facility];  Service: Endoscopy;  Laterality: N/A;    COLONOSCOPY N/A 5/20/2020    Dr. Britton; internal hemorrhoids; diverticulosis; polyps removed; repeat in 3 years    CYSTOSCOPY N/A 8/26/2020    Procedure: CYSTOSCOPY;  Surgeon: Charlotte Walters Jr., MD;  Location: [facility] OR;  Service: Urology;  Laterality: N/A;    DISSECTION OF NECK Left 9/13/2021    Procedure: DISSECTION, NECK;  Surgeon: Ryan Torres MD;  Location: [facility] OR [address] [place] [address];  Service: ENT;  Laterality: Left;    ESOPHAGOGASTRODUODENOSCOPY N/A 5/20/2020    Dr. Britton; small hiatal hernia; gastritis; 8 gastric polyps removed    ESOPHAGOGASTRODUODENOSCOPY N/A 4/1/2022    Procedure: EGD (ESOPHAGOGASTRODUODENOSCOPY);  Surgeon: Jose Britton MD;  Location: [facility];  Service: Endoscopy;  Laterality: N/A;    FOOT SURGERY      HYSTERECTOMY      TVH/BSO > 20 years    INTRALUMINAL GASTROINTESTINAL TRACT IMAGING VIA CAPSULE N/A 6/4/2020    Procedure: IMAGING PROCEDURE, GI TRACT, INTRALUMINAL, VIA CAPSULE;  Surgeon: Jose Britton MD;  Location: [facility];  Service: Endoscopy;  Laterality: N/A;    KNEE SURGERY  06/06/2013    right knee tear Dr Iverson     LAPAROSCOPIC CHOLECYSTECTOMY N/A 9/17/2020    Procedure: CHOLECYSTECTOMY, LAPAROSCOPIC;  Surgeon: Forrest Veronica MD;  Location: [facility]  OR;  Service: General;  Laterality: N/A;    LUNG LOBECTOMY  1966    right middle lobectomy, due to Tb    OOPHORECTOMY      SHOULDER SURGERY      francis     THYROIDECTOMY Bilateral 5/19/2021    Procedure: THYROIDECTOMY;  Surgeon: Jamia Amezquita MD;  Location: [facility] OR [address] [place] [address];  Service: General;  Laterality: Bilateral;    THYROIDECTOMY Bilateral 9/13/2021    Procedure: THYROIDECTOMY WITH INTRA-OP PTH;  Surgeon: Ryan Torres MD;  Location: [facility] OR [address] [place] [address];  Service: ENT;  Laterality: Bilateral;  NIM tube  Intraop PTH       Review of patient's allergies indicates:  No Known Allergies    No current facility-administered medications on file prior to encounter.     Current Outpatient Medications on File Prior to Encounter   Medication Sig    amLODIPine (NORVASC) 2.5 MG tablet Take 1 tablet by mouth once daily    baclofen (LIORESAL) 10 MG tablet Take 1 tablet (10 mg total) by mouth 3 (three) times daily. Start w. Half a tab first 7 days    blood sugar diagnostic (BLOOD GLUCOSE TEST) Strp True Metrix glucose strips check once daily    blood-glucose meter kit True Metrix glucometer check glucose once daily    calcium carbonate (TUMS) 200 mg calcium (500 mg) chewable tablet Chew and swallow 2 tablets (1,000 mg total) by mouth 3 (three) times daily. for 14 days    carboxymethylcellulose sodium (REFRESH OPHT) Apply 1 drop to eye daily as needed.    clotrimazole (MYCELEX) 10 mg sol Take 1 tablet (10 mg total) by mouth 5 (five) times daily. for 10 days    conjugated estrogens (PREMARIN) vaginal cream Place 0.5 g vaginally once daily AND 0.5 g twice a week. (Patient taking differently: Place 0.5 g vaginally once daily AND 0.5 g twice a week. Saturdays and tuesdays)    DULoxetine (CYMBALTA) 30 MG capsule Take 1 capsule (30 mg total) by mouth once daily.    ergocalciferol (ERGOCALCIFEROL) 50,000 unit Cap Take 1 capsule (50,000 Units total) by mouth every 30 days.    erythromycin (ROMYCIN) ophthalmic ointment  Place into the left eye every 4 (four) hours. Place a 1/2 inch ribbon of ointment into the lower eyelid. for 5 days    gabapentin (NEURONTIN) 300 MG capsule Take 1 capsule (300 mg total) by mouth 2 (two) times daily.    lansoprazole (PREVACID) 30 MG capsule Take 30 mg by mouth once daily.    levothyroxine (SYNTHROID) 88 MCG tablet Take 1 tablet (88 mcg total) by mouth before breakfast.    losartan (COZAAR) 100 MG tablet Take 1 tablet by mouth once daily    nystatin (MYCOSTATIN) 100,000 unit/mL suspension Take 4 mLs (400,000 Units total) by mouth 4 (four) times daily. for 10 days    pantoprazole (PROTONIX) 40 MG tablet Take 1 tablet (40 mg total) by mouth once daily.    polyethylene glycol (GLYCOLAX) 17 gram PwPk Take 17 g by mouth once daily.    rosuvastatin (CRESTOR) 20 MG tablet Take 0.5 tablets (10 mg total) by mouth every evening.    simethicone (MYLICON) 125 MG chewable tablet Take 125 mg by mouth every 6 (six) hours as needed for Flatulence.    UNABLE TO FIND I [employer] guard bid     Family History       Problem Relation (Age of Onset)    Alcohol abuse Son    Alzheimer's disease Maternal Uncle, Sister    Breast cancer Sister (60)    Cancer Mother, Brother, Maternal Grandmother, Brother    Colon cancer Other    Dementia Sister    Diabetes Son, Maternal Aunt    Emphysema Father    Hyperlipidemia Mother    Hypertension Mother, Father, Son    No Known Problems Daughter    Obesity Paternal Uncle    Prostate cancer Other          Tobacco Use    Smoking status: Never Smoker    Smokeless tobacco: Never Used   Substance and Sexual Activity    Alcohol use: Not Currently     Comment: stopped 2019    Drug use: No    Sexual activity: Not Currently     Review of Systems   Constitutional:  Positive for fatigue. Negative for appetite change, chills and fever.   HENT:  Negative for congestion.    Eyes:  Negative for photophobia and visual disturbance.   Respiratory:  Negative for cough, shortness of breath and  wheezing.    Cardiovascular:  Negative for chest pain and palpitations.   Gastrointestinal:  Negative for abdominal pain, diarrhea, nausea and vomiting.   Genitourinary:  Negative for dysuria and hematuria.   Musculoskeletal:  Negative for neck pain and neck stiffness.   Skin:  Negative for pallor and rash.   Neurological:  Positive for dizziness and numbness. Negative for syncope and weakness.   Psychiatric/Behavioral:  Negative for agitation and confusion.    All other systems reviewed and are negative.  Objective:     Vital Signs (Most Recent):  Temp: 98.1 °F (36.7 °C) (08/06/22 1528)  Pulse: 71 (08/06/22 1528)  Resp: 18 (08/06/22 1528)  BP: 138/63 (08/06/22 1528)  SpO2: 98 % (08/06/22 1528) Vital Signs (24h Range):  Temp:  [98.1 °F (36.7 °C)-98.9 °F (37.2 °C)] 98.1 °F (36.7 °C)  Pulse:  [] 71  Resp:  [14-22] 18  SpO2:  [97 %-100 %] 98 %  BP: (118-159)/(58-83) 138/63     Weight: 67.4 kg (148 lb 9.4 oz)  Body mass index is 23.98 kg/m².    Physical Exam  Constitutional:       General: She is not in acute distress.     Appearance: She is well-developed.   HENT:      Head: Normocephalic and atraumatic.   Eyes:      Conjunctiva/sclera: Conjunctivae normal.      Pupils: Pupils are equal, round, and reactive to light.   Cardiovascular:      Rate and Rhythm: Normal rate and regular rhythm.      Heart sounds: Normal heart sounds.   Pulmonary:      Effort: Pulmonary effort is normal. No respiratory distress.      Breath sounds: Normal breath sounds.   Abdominal:      General: Bowel sounds are normal. There is no distension.      Palpations: Abdomen is soft.   Musculoskeletal:         General: Tenderness (left shoulder) present. Normal range of motion.      Cervical back: Normal range of motion and neck supple.   Skin:     General: Skin is warm and dry.   Neurological:      General: No focal deficit present.      Mental Status: She is alert and oriented to person, place, and time.   Psychiatric:         Mood and  Affect: Mood normal.         Behavior: Behavior normal.         Thought Content: Thought content normal.         CRANIAL NERVES     CN III, IV, VI   Pupils are equal, round, and reactive to light.     Significant Labs: All pertinent labs within the past 24 hours have been reviewed.  CBC:   Recent Labs   Lab 08/04/22  1730 08/06/22  0503   WBC 6.42 5.67   HGB 12.5 11.5*   HCT 38.7 34.4*    257     CMP:   Recent Labs   Lab 08/04/22  1729 08/06/22  0503 08/06/22  1247    141  --    K 4.4 3.7  --     104  --    CO2 26 25  --     112*  --    BUN 19 14  --    CREATININE 1.0 1.1  --    CALCIUM 8.2* 7.9* 8.0*   PROT 7.6 6.8  --    ALBUMIN 3.8 3.3*  --    BILITOT 0.7 0.6  --    ALKPHOS 57 53*  --    AST 17 14  --    ALT 15 15  --    ANIONGAP 11 12  --        Significant Imaging: I have reviewed all pertinent imaging results/findings within the past 24 hours.    Assessment/Plan:     * Hypocalcemia  Repleted; monitor and replete as needed.      Weakness of both lower extremities  C/o intermittent episodes of LE weakness accompanied by N/T  Unable to ambulate this morning  MRI L-spine  Replete calcium  Neurology consult  PT/OT consult  Fall precautions      Postoperative hypothyroidism  Patient has chronic hypothyroidism. TFTs reviewed-   Lab Results   Component Value Date    TSH <0.010 (L) 08/06/2022   . Will continue chronic levothyroxine and adjust for and clinical changes.  Synthroid recently decreased.  Continue current dosage.      RONNIE (generalized anxiety disorder), 2019  Chronic; continue chronic SNRI    Type 2 diabetes mellitus, without long-term current use of insulin, dx 3/2020  Patient's FSGs are controlled on current medication regimen.  Last A1c reviewed-   Lab Results   Component Value Date    HGBA1C 5.8 (H) 04/06/2022     Most recent fingerstick glucose reviewed- No results for input(s): POCTGLUCOSE in the last 24 hours.  Current correctional scale  Medium  Maintain anti-hyperglycemic  dose as follows-   Antihyperglycemics (From admission, onward)            Start     Stop Route Frequency Ordered    08/06/22 1021  insulin aspart U-100 pen 1-10 Units         -- SubQ Before meals & nightly PRN 08/06/22 1003        Hold Oral hypoglycemics while patient is in the hospital.    GERD (gastroesophageal reflux disease)  Chronic; continue chronic PPI      PAD (peripheral artery disease)  Chronic issue; continue chronic statin.    Hypertension associated with diabetes  Chronic, controlled.  Latest blood pressure and vitals reviewed-   Temp:  [98.1 °F (36.7 °C)-98.9 °F (37.2 °C)]   Pulse:  []   Resp:  [14-22]   BP: (118-159)/(58-83)   SpO2:  [97 %-100 %] .   Home meds for hypertension were reviewed and noted below.   Hypertension Medications             amLODIPine (NORVASC) 2.5 MG tablet Take 1 tablet by mouth once daily    losartan (COZAAR) 100 MG tablet Take 1 tablet by mouth once daily          While in the hospital, will manage blood pressure as follows; Continue home antihypertensive regimen    Will utilize p.r.n. blood pressure medication only if patient's blood pressure greater than  180/110 and she develops symptoms such as worsening chest pain or shortness of breath.      Hyperlipidemia associated with type 2 diabetes mellitus, baseline    Patient is chronically on statin.will continue for now. Monitor clinically. Last LDL was   Lab Results   Component Value Date    LDLCALC 97.4 04/06/2022            VTE Risk Mitigation (From admission, onward)         Ordered     enoxaparin injection 40 mg  Daily         08/06/22 1003     IP VTE HIGH RISK PATIENT  Once         08/06/22 1003     Place sequential compression device  Until discontinued         08/06/22 1003                   KAREN Madsen  Department of Hospital Medicine   [facility]

## 2022-08-06 NOTE — PT/OT/SLP PROGRESS
Occupational Therapy      Patient Name:  Erica Masterson   MRN:  [id number]    Patient not seen today secondary to Other (Comment) (Spoke to Nurse Hayden. OT awaiting further documentation. Will follow up). Will follow-up  .    8/6/2022

## 2022-08-06 NOTE — ASSESSMENT & PLAN NOTE
Patient has chronic hypothyroidism. TFTs reviewed-   Lab Results   Component Value Date    TSH <0.010 (L) 08/06/2022   . Will continue chronic levothyroxine and adjust for and clinical changes.  Synthroid recently decreased.  Continue current dosage.

## 2022-08-06 NOTE — PLAN OF CARE
[facility]  Initial Discharge Assessment       Primary Care Provider: Narayan Myers MD    Admission Diagnosis: Hypocalcemia [E83.51]  Tachycardia [R00.0]  Muscle spasm [M62.838]    Admission Date: 8/6/2022  Expected Discharge Date: to be determined    SW met with pt at bedside to complete discharge assessment, verified PCP, pharmacy and information on facesheet.  No HH, dialysis or Coumadin.  Pt has glucometer.  Spouse will drive pt home.  No needs identified at this time.    Discharge Barriers Identified: None    Payor: [employer] / Plan: Supersolid 65 / Product Type: Medicare Advantage /     Extended Emergency Contact Information  Primary Emergency Contact: AnneKristina  Address: [place] [address] [place]  Mobile Phone: [phone]  Relation: Sister  Preferred language: English   needed? No  Secondary Emergency Contact: Joel Masterson  Address: [address] [place] [address] [facility]  Home Phone: [phone]  Mobile Phone: [phone]  Relation: Spouse  Preferred language: English   needed? No    Discharge Plan A: Home  Discharge Plan B: Home      [facility] [place] - [address]  [address] [facility]  [place] [address]  Phone: [phone] Fax: [phone]      Initial Assessment (most recent)     Adult Discharge Assessment - 08/06/22 1525        Discharge Assessment    Assessment Type Discharge Planning Assessment     Confirmed/corrected address, phone number and insurance Yes     Confirmed Demographics Correct on Facesheet     Source of Information patient     Does patient/caregiver understand observation status Yes     Communicated KIKA with patient/caregiver No     Lives With spouse     Do you expect to return to your current living situation? Yes     Prior to hospitilization cognitive status: Alert/Oriented     Current cognitive status:  Alert/Oriented     Walking or Climbing Stairs Difficulty none     Dressing/Bathing Difficulty none     Equipment Currently Used at Home glucometer     Patient currently being followed by outpatient case management? No     Do you currently have service(s) that help you manage your care at home? No     Do you take prescription medications? Yes     Do you have prescription coverage? Yes     Coverage PHN     Do you have any problems affording any of your prescribed medications? No     Is the patient taking medications as prescribed? yes     Who is going to help you get home at discharge? spouse     How do you get to doctors appointments? car, drives self     Are you on dialysis? No     Do you take coumadin? No     Discharge Plan A Home     Discharge Plan B Home     DME Needed Upon Discharge  none     Discharge Plan discussed with: Patient     Discharge Barriers Identified None

## 2022-08-06 NOTE — ASSESSMENT & PLAN NOTE
C/o intermittent episodes of LE weakness accompanied by N/T  Unable to ambulate this morning  MRI L-spine  Replete calcium  Neurology consult  PT/OT consult  Fall precautions

## 2022-08-06 NOTE — PLAN OF CARE
08/06/22 1408   ORDOÑEZ Message   Medicare Outpatient and Observation Notification regarding financial responsibility Explained to patient/caregiver;Signed/date by patient/caregiver   Date ORDOÑEZ was signed 08/06/22   Time ORDOÑEZ was signed 1401

## 2022-08-06 NOTE — HPI
Erica Masterson is a 77 y/o F with a past medical history significant for HTN, HLD, anxiety, and thyroid cancer s/p thyroidectomy who presented to the ED after finding herself unable to ambulate early this morning.  Reports that she has had intermittent episodes of N/T to her extremities and episodes of difficutly ambulating that usually resolve spontaneously before she can get to the ED to be assessed.  When such episodes have occurred, she reports that she has been diagnosed with anxiety as no other reason has been determined. She has had pain and decreased ROM to the left shoulder which pt states is being treated by orthopedics.  Symptoms seemed to be worse today than usual.  Denies recent fever, chest pain, SOB, dysuria, difficulty with speech or swallowing or any other symptoms. Reports decreasing her synthroid dose a few days ago at the direction of her endocrinologist, but no other medication changes.  ED workup is significant for hypocalcemia.  She is placed in observation under the service of hospital medicine for continued monitoring and treatment.

## 2022-08-06 NOTE — ED PROVIDER NOTES
Encounter Date: 8/6/2022       History     Chief Complaint   Patient presents with    Spasms     All over. Has had issues in the past, but never this bad to the point where they feel as if they are going to pass out. Decreased thyroid medication this week.     Chief complaint:  Pain and weakness    HPI:  76-year-old female presents with pain all over and generalized weakness.  She reports that she is now unable to walk.  She has been seen by multiple different specialties with primary focus and gastroenterology.  She reports diffuse abdominal pain that is currently absent.  She recently decreased her Synthroid dosage.  She admits to morning stiffness.  No history of polymyalgia rheumatica or polymyositis.  Past medical history is significant for anxiety, hypertension, hyperlipidemia and thyroid cancer status post thyroidectomy.        Review of patient's allergies indicates:  No Known Allergies  Past Medical History:   Diagnosis Date    Adenomatous polyp of colon 3/26/2012    Allergy     Anxiety     Cancer     Cataract     Colon polyp     Degenerative arthritis of knee 04/03/2012    Diverticulosis     Encounter for blood transfusion     GERD (gastroesophageal reflux disease)     History of thyroid cancer 2021    Hyperlipidemia     Hypertension     Lateral meniscal tear 5/20/2013    Multinodular goiter 03/26/2012    Myopathy, unspecified 01/18/2010    Tuberculosis      Past Surgical History:   Procedure Laterality Date    BREAST BIOPSY Left 2015    benign    CHOLECYSTECTOMY      COLONOSCOPY  12/2/15    Dr. Britton, multiple polyps, recheck five years-three years if more than 2 polyps are adenomatous    COLONOSCOPY N/A 12/2/2015    COLONOSCOPY N/A 3/20/2019    Procedure: COLONOSCOPY;  Surgeon: Jose Britton MD;  [facility];  Service: Endoscopy;  Laterality: N/A;    COLONOSCOPY N/A 5/20/2020    Dr. Britton; internal hemorrhoids; diverticulosis; polyps removed; repeat in 3 years     CYSTOSCOPY N/A 8/26/2020    Procedure: CYSTOSCOPY;  Surgeon: Charlotte Walters Jr., MD;  Location: [facility];  Service: Urology;  Laterality: N/A;    DISSECTION OF NECK Left 9/13/2021    Procedure: DISSECTION, NECK;  Surgeon: Ryan Torres MD;  [facility];  Service: ENT;  Laterality: Left;    ESOPHAGOGASTRODUODENOSCOPY N/A 5/20/2020    Dr. Britton; small hiatal hernia; gastritis; 8 gastric polyps removed    ESOPHAGOGASTRODUODENOSCOPY N/A 4/1/2022    Procedure: EGD (ESOPHAGOGASTRODUODENOSCOPY);  Surgeon: Jose Britton MD;  Location: [facility];  Service: Endoscopy;  Laterality: N/A;    FOOT SURGERY      HYSTERECTOMY      TVH/BSO > 20 years    INTRALUMINAL GASTROINTESTINAL TRACT IMAGING VIA CAPSULE N/A 6/4/2020    Procedure: IMAGING PROCEDURE, GI TRACT, INTRALUMINAL, VIA CAPSULE;  Surgeon: Jose Britton MD;  Location: [facility];  Service: Endoscopy;  Laterality: N/A;    KNEE SURGERY  06/06/2013    right knee tear Dr Iverson     LAPAROSCOPIC CHOLECYSTECTOMY N/A 9/17/2020    Procedure: CHOLECYSTECTOMY, LAPAROSCOPIC;  Surgeon: Forrest Veronica MD;  [facility];  Service: General;  Laterality: N/A;    LUNG LOBECTOMY  1966    right middle lobectomy, due to Tb    OOPHORECTOMY      SHOULDER SURGERY      francis     THYROIDECTOMY Bilateral 5/19/2021    Procedure: THYROIDECTOMY;  Surgeon: Jamia Amezquita MD;  [facility];  Service: General;  Laterality: Bilateral;    THYROIDECTOMY Bilateral 9/13/2021    Procedure: THYROIDECTOMY WITH INTRA-OP PTH;  Surgeon: Ryan Torres MD;  [facility];  Service: ENT;  Laterality: Bilateral;  NIM tube  Intraop PTH     Family History   Problem Relation Age of Onset    Colon cancer Other     Cancer Mother     Hypertension Mother     Hyperlipidemia Mother     Cancer Brother     Hypertension Father     Emphysema Father     Diabetes Son     Hypertension Son     Alcohol abuse Son     Diabetes Maternal Aunt      Alzheimer's disease Maternal Uncle     Cancer Maternal Grandmother     No Known Problems Daughter     Dementia Sister     Alzheimer's disease Sister     Breast cancer Sister 60    Obesity Paternal Uncle     Prostate cancer Other     Cancer Brother     Melanoma Neg Hx     Psoriasis Neg Hx     Lupus Neg Hx     Eczema Neg Hx     Amblyopia Neg Hx     Blindness Neg Hx     Cataracts Neg Hx     Glaucoma Neg Hx     Macular degeneration Neg Hx     Retinal detachment Neg Hx     Strabismus Neg Hx     Stroke Neg Hx     Thyroid cancer Neg Hx     Colon polyps Neg Hx     Ulcerative colitis Neg Hx     Stomach cancer Neg Hx     Rectal cancer Neg Hx      Social History     Tobacco Use    Smoking status: Never Smoker    Smokeless tobacco: Never Used   Substance Use Topics    Alcohol use: Not Currently     Comment: stopped 2019    Drug use: No     Review of Systems   Constitutional: Positive for fatigue. Negative for activity change, appetite change, chills and fever.   Eyes: Negative for visual disturbance.   Respiratory: Negative for apnea and shortness of breath.    Cardiovascular: Negative for chest pain and palpitations.   Gastrointestinal: Positive for abdominal pain. Negative for abdominal distention.   Genitourinary: Negative for difficulty urinating.   Musculoskeletal: Positive for arthralgias and myalgias. Negative for neck pain.   Skin: Negative for pallor and rash.   Neurological: Positive for weakness (Generalized weakness). Negative for headaches.   Hematological: Does not bruise/bleed easily.   Psychiatric/Behavioral: Negative for agitation.       Physical Exam     Initial Vitals [08/06/22 0434]   BP Pulse Resp Temp SpO2   (!) 159/74 108 (!) 22 98.9 °F (37.2 °C) 99 %      MAP       --         Physical Exam    Nursing note and vitals reviewed.  Constitutional: She appears well-developed and well-nourished.   HENT:   Head: Normocephalic and atraumatic.   Eyes: Conjunctivae are normal.   Neck:  Neck supple.   Normal range of motion.  Cardiovascular: Regular rhythm and normal heart sounds. Exam reveals no gallop and no friction rub.    No murmur heard.  Tachycardic rate   Pulmonary/Chest: Effort normal and breath sounds normal. No respiratory distress. She has no wheezes. She has no rhonchi. She has no rales.   Abdominal: Abdomen is soft. She exhibits no distension. There is no abdominal tenderness.   Musculoskeletal:         General: Normal range of motion.      Cervical back: Normal range of motion and neck supple.     Neurological: She is alert and oriented to person, place, and time.   Skin: Skin is warm and dry. No erythema.   Psychiatric:   Anxious         ED Course   Procedures  Labs Reviewed   CBC W/ AUTO DIFFERENTIAL   COMPREHENSIVE METABOLIC PANEL   SARS-COV-2 RNA AMPLIFICATION, QUAL   CK   C-REACTIVE PROTEIN   MAGNESIUM   PHOSPHORUS   URINALYSIS, REFLEX TO URINE CULTURE   TSH     EKG Readings: (Independently Interpreted)   Rhythm: Normal Sinus Rhythm. Heart Rate: 70. Ectopy: No Ectopy. Conduction: Normal. ST Segments: Normal ST Segments. T Waves: Normal. Axis: Normal. Other Findings: Prolonged QT Interval. Clinical Impression: Normal Sinus Rhythm       Imaging Results    None          Medications   0.9%  NaCl infusion (has no administration in time range)   ketorolac injection 9.999 mg (has no administration in time range)   diazePAM injection 5 mg (has no administration in time range)                 ED Course as of 08/06/22 1944   Sat Aug 06, 2022   0714 Signed over from Dr. Young for diffuse muscle spasms.  Patient reports these have been present for months but were worse last night.  Ionized calcium is low.  Calcium gluconate has been ordered.  Signed out at the bedside the patient is in no distress.  I will reassess the patient after the calcium. [EF]   0854 Still c/o tingling and spasms [EF]   0856 76-year-old female with a history of hypothyroidism status post thyroidectomy and  hypocalcemia presents with diffuse muscle spasms.  She is still complaining of tingling and muscle spasms.  Right foot is turning in she also reports that her right shoulder feels like it wants to deviate forward.  Patient is anxious.  I suspect there is an anxiety component to this presentation but her ionized calcium is low.  She does not feel any better with IV calcium.  Hospital Medicine consult for admission. Jenn to admit. [EF]      ED Course User Index  [EF] Kyle Moore MD             Clinical Impression:   Final diagnoses:  [R00.0] Tachycardia                 Chan Young III, MD  08/06/22 1944

## 2022-08-06 NOTE — ASSESSMENT & PLAN NOTE
Chronic, controlled.  Latest blood pressure and vitals reviewed-   Temp:  [98.1 °F (36.7 °C)-98.9 °F (37.2 °C)]   Pulse:  []   Resp:  [14-22]   BP: (118-159)/(58-83)   SpO2:  [97 %-100 %] .   Home meds for hypertension were reviewed and noted below.   Hypertension Medications             amLODIPine (NORVASC) 2.5 MG tablet Take 1 tablet by mouth once daily    losartan (COZAAR) 100 MG tablet Take 1 tablet by mouth once daily          While in the hospital, will manage blood pressure as follows; Continue home antihypertensive regimen    Will utilize p.r.n. blood pressure medication only if patient's blood pressure greater than  180/110 and she develops symptoms such as worsening chest pain or shortness of breath.

## 2022-08-06 NOTE — ASSESSMENT & PLAN NOTE
Patient is chronically on statin.will continue for now. Monitor clinically. Last LDL was   Lab Results   Component Value Date    LDLCALC 97.4 04/06/2022

## 2022-08-07 LAB
ALBUMIN SERPL BCP-MCNC: 3.1 G/DL (ref 3.5–5.2)
ALP SERPL-CCNC: 48 U/L (ref 55–135)
ALT SERPL W/O P-5'-P-CCNC: 14 U/L (ref 10–44)
ANION GAP SERPL CALC-SCNC: 10 MMOL/L (ref 8–16)
AST SERPL-CCNC: 15 U/L (ref 10–40)
BILIRUB SERPL-MCNC: 0.6 MG/DL (ref 0.1–1)
BUN SERPL-MCNC: 12 MG/DL (ref 8–23)
CA-I BLDV-SCNC: 1.08 MMOL/L (ref 1.06–1.42)
CALCIUM SERPL-MCNC: 8.5 MG/DL (ref 8.7–10.5)
CHLORIDE SERPL-SCNC: 104 MMOL/L (ref 95–110)
CK SERPL-CCNC: 162 U/L (ref 20–180)
CO2 SERPL-SCNC: 25 MMOL/L (ref 23–29)
CREAT SERPL-MCNC: 0.9 MG/DL (ref 0.5–1.4)
EST. GFR  (NO RACE VARIABLE): >60 ML/MIN/1.73 M^2
GLUCOSE SERPL-MCNC: 102 MG/DL (ref 70–110)
MAGNESIUM SERPL-MCNC: 1.8 MG/DL (ref 1.6–2.6)
PHOSPHATE SERPL-MCNC: 4.4 MG/DL (ref 2.7–4.5)
POCT GLUCOSE: 109 MG/DL (ref 70–110)
POTASSIUM SERPL-SCNC: 3.6 MMOL/L (ref 3.5–5.1)
PROT SERPL-MCNC: 6.5 G/DL (ref 6–8.4)
SODIUM SERPL-SCNC: 139 MMOL/L (ref 136–145)

## 2022-08-07 PROCEDURE — 96372 THER/PROPH/DIAG INJ SC/IM: CPT | Performed by: NURSE PRACTITIONER

## 2022-08-07 PROCEDURE — 25000003 PHARM REV CODE 250: Performed by: NURSE PRACTITIONER

## 2022-08-07 PROCEDURE — 80053 COMPREHEN METABOLIC PANEL: CPT | Performed by: NURSE PRACTITIONER

## 2022-08-07 PROCEDURE — 99204 PR OFFICE/OUTPT VISIT, NEW, LEVL IV, 45-59 MIN: ICD-10-PCS | Mod: ,,, | Performed by: NEUROLOGICAL SURGERY

## 2022-08-07 PROCEDURE — 96361 HYDRATE IV INFUSION ADD-ON: CPT

## 2022-08-07 PROCEDURE — 96366 THER/PROPH/DIAG IV INF ADDON: CPT

## 2022-08-07 PROCEDURE — 99204 OFFICE O/P NEW MOD 45 MIN: CPT | Mod: ,,, | Performed by: NEUROLOGICAL SURGERY

## 2022-08-07 PROCEDURE — 82550 ASSAY OF CK (CPK): CPT | Performed by: NURSE PRACTITIONER

## 2022-08-07 PROCEDURE — 97161 PT EVAL LOW COMPLEX 20 MIN: CPT

## 2022-08-07 PROCEDURE — 63600175 PHARM REV CODE 636 W HCPCS: Performed by: NURSE PRACTITIONER

## 2022-08-07 PROCEDURE — 94761 N-INVAS EAR/PLS OXIMETRY MLT: CPT

## 2022-08-07 PROCEDURE — G0378 HOSPITAL OBSERVATION PER HR: HCPCS

## 2022-08-07 PROCEDURE — 84100 ASSAY OF PHOSPHORUS: CPT | Performed by: NURSE PRACTITIONER

## 2022-08-07 PROCEDURE — 25000003 PHARM REV CODE 250: Performed by: HOSPITALIST

## 2022-08-07 PROCEDURE — 99900035 HC TECH TIME PER 15 MIN (STAT)

## 2022-08-07 PROCEDURE — 82330 ASSAY OF CALCIUM: CPT | Performed by: NURSE PRACTITIONER

## 2022-08-07 PROCEDURE — 83735 ASSAY OF MAGNESIUM: CPT | Performed by: NURSE PRACTITIONER

## 2022-08-07 RX ORDER — CALCIUM GLUCONATE 20 MG/ML
1 INJECTION, SOLUTION INTRAVENOUS ONCE
Status: COMPLETED | OUTPATIENT
Start: 2022-08-07 | End: 2022-08-07

## 2022-08-07 RX ORDER — DOCUSATE SODIUM 100 MG/1
100 CAPSULE, LIQUID FILLED ORAL DAILY
Status: DISCONTINUED | OUTPATIENT
Start: 2022-08-07 | End: 2022-08-09 | Stop reason: HOSPADM

## 2022-08-07 RX ORDER — HYDROCODONE BITARTRATE AND ACETAMINOPHEN 5; 325 MG/1; MG/1
1 TABLET ORAL EVERY 6 HOURS PRN
Status: DISCONTINUED | OUTPATIENT
Start: 2022-08-07 | End: 2022-08-09 | Stop reason: HOSPADM

## 2022-08-07 RX ORDER — ERYTHROMYCIN 5 MG/G
OINTMENT OPHTHALMIC NIGHTLY
Status: DISCONTINUED | OUTPATIENT
Start: 2022-08-07 | End: 2022-08-09 | Stop reason: HOSPADM

## 2022-08-07 RX ORDER — CALCIUM GLUCONATE 20 MG/ML
1 INJECTION, SOLUTION INTRAVENOUS ONCE
Status: DISCONTINUED | OUTPATIENT
Start: 2022-08-07 | End: 2022-08-09 | Stop reason: HOSPADM

## 2022-08-07 RX ADMIN — DOCUSATE SODIUM 100 MG: 100 CAPSULE, LIQUID FILLED ORAL at 09:08

## 2022-08-07 RX ADMIN — CLOTRIMAZOLE 10 MG: 10 LOZENGE ORAL; TOPICAL at 05:08

## 2022-08-07 RX ADMIN — CALCIUM CARBONATE (ANTACID) CHEW TAB 500 MG 1000 MG: 500 CHEW TAB at 08:08

## 2022-08-07 RX ADMIN — BACLOFEN 10 MG: 10 TABLET ORAL at 09:08

## 2022-08-07 RX ADMIN — CALCIUM CARBONATE (ANTACID) CHEW TAB 500 MG 1000 MG: 500 CHEW TAB at 09:08

## 2022-08-07 RX ADMIN — CALCIUM CARBONATE (ANTACID) CHEW TAB 500 MG 1000 MG: 500 CHEW TAB at 03:08

## 2022-08-07 RX ADMIN — AMLODIPINE BESYLATE 2.5 MG: 2.5 TABLET ORAL at 08:08

## 2022-08-07 RX ADMIN — ATORVASTATIN CALCIUM 40 MG: 40 TABLET, FILM COATED ORAL at 09:08

## 2022-08-07 RX ADMIN — CLOTRIMAZOLE 10 MG: 10 LOZENGE ORAL; TOPICAL at 02:08

## 2022-08-07 RX ADMIN — ENOXAPARIN SODIUM 40 MG: 100 INJECTION SUBCUTANEOUS at 04:08

## 2022-08-07 RX ADMIN — ASPIRIN 81 MG CHEWABLE TABLET 81 MG: 81 TABLET CHEWABLE at 08:08

## 2022-08-07 RX ADMIN — CALCIUM GLUCONATE 1 G: 20 INJECTION, SOLUTION INTRAVENOUS at 11:08

## 2022-08-07 RX ADMIN — MELATONIN TAB 3 MG 6 MG: 3 TAB at 09:08

## 2022-08-07 RX ADMIN — CLOTRIMAZOLE 10 MG: 10 LOZENGE ORAL; TOPICAL at 10:08

## 2022-08-07 RX ADMIN — CLOTRIMAZOLE 10 MG: 10 LOZENGE ORAL; TOPICAL at 09:08

## 2022-08-07 RX ADMIN — ERYTHROMYCIN: 5 OINTMENT OPHTHALMIC at 09:08

## 2022-08-07 RX ADMIN — BACLOFEN 10 MG: 10 TABLET ORAL at 08:08

## 2022-08-07 RX ADMIN — LEVOTHYROXINE SODIUM 88 MCG: 0.09 TABLET ORAL at 05:08

## 2022-08-07 RX ADMIN — PANTOPRAZOLE SODIUM 40 MG: 40 TABLET, DELAYED RELEASE ORAL at 08:08

## 2022-08-07 RX ADMIN — LOSARTAN POTASSIUM 100 MG: 25 TABLET, FILM COATED ORAL at 08:08

## 2022-08-07 RX ADMIN — BACLOFEN 10 MG: 10 TABLET ORAL at 03:08

## 2022-08-07 RX ADMIN — DULOXETINE 30 MG: 30 CAPSULE, DELAYED RELEASE ORAL at 08:08

## 2022-08-07 RX ADMIN — POLYETHYLENE GLYCOL 3350 17 G: 17 POWDER, FOR SOLUTION ORAL at 08:08

## 2022-08-07 RX ADMIN — HYDROCODONE BITARTRATE AND ACETAMINOPHEN 1 TABLET: 5; 325 TABLET ORAL at 06:08

## 2022-08-07 NOTE — CARE UPDATE
08/06/22 2006   Patient Assessment/Suction   Level of Consciousness (AVPU) alert   Respiratory Effort Normal;Unlabored   Expansion/Accessory Muscles/Retractions no use of accessory muscles   PRE-TX-O2   O2 Device (Oxygen Therapy) room air   SpO2 97 %   Pulse Oximetry Type Intermittent   $ Pulse Oximetry - Multiple Charge Pulse Oximetry - Multiple   Pulse 67   Resp 18   Aerosol Therapy   $ Aerosol Therapy Charges PRN treatment not required   Respiratory Treatment Status (SVN) PRN treatment not required

## 2022-08-07 NOTE — PROGRESS NOTES
"[facility] Hospital Medicine  Progress Note    Patient Name: Erica Masterson  MRN: [id number]  Patient Class: OP- Observation   Admission Date: 8/6/2022  Length of Stay: 0 days  Attending Physician: Alberto Alvarez MD  Primary Care Provider: Narayan Myers MD        Subjective:     Principal Problem:Hypocalcemia        HPI:  Erica Masterson is a 75 y/o F with a past medical history significant for HTN, HLD, anxiety, and thyroid cancer s/p thyroidectomy who presented to the ED after finding herself unable to ambulate early this morning.  Reports that she has had intermittent episodes of N/T to her extremities and episodes of difficutly ambulating that usually resolve spontaneously before she can get to the ED to be assessed.  When such episodes have occurred, she reports that she has been diagnosed with anxiety as no other reason has been determined. She has had pain and decreased ROM to the left shoulder which pt states is being treated by orthopedics.  Symptoms seemed to be worse today than usual.  Denies recent fever, chest pain, SOB, dysuria, difficulty with speech or swallowing or any other symptoms. Reports decreasing her synthroid dose a few days ago at the direction of her endocrinologist, but no other medication changes.  ED workup is significant for hypocalcemia.  She is placed in observation under the service of hospital medicine for continued monitoring and treatment.          Overview/Hospital Course:  No notes on file    Interval History: Pt found sitting up at bedside with lunch tray.  States she is have a "rough day" and has no appetite.  Calcium better, but still c/o LE weakness and tightness to left shoulder.  Discussed chronic nature of left shoulder tightness.  She will continue OP follow up with her Orthopedics and physical medicine and rehab;  MRI L-spine reviewed.  Will consult neurosurgery for further recommendations.      Review of Systems   Constitutional:  Positive for " appetite change and fatigue. Negative for chills and fever.   HENT:  Negative for congestion.    Eyes:  Negative for photophobia and visual disturbance.   Respiratory:  Negative for cough, shortness of breath and wheezing.    Cardiovascular:  Negative for chest pain and palpitations.   Gastrointestinal:  Negative for abdominal pain, diarrhea, nausea and vomiting.   Genitourinary:  Negative for dysuria and hematuria.   Musculoskeletal:  Positive for gait problem. Negative for neck pain and neck stiffness.   Skin:  Negative for pallor and rash.   Neurological:  Positive for dizziness and numbness. Negative for syncope and weakness.   Psychiatric/Behavioral:  Negative for agitation and confusion.    All other systems reviewed and are negative.  Objective:     Vital Signs (Most Recent):  Temp: 98 °F (36.7 °C) (08/07/22 1215)  Pulse: 75 (08/07/22 1215)  Resp: 18 (08/07/22 1215)  BP: (!) 167/75 (08/07/22 1215)  SpO2: 98 % (08/07/22 1215)   Vital Signs (24h Range):  Temp:  [97.1 °F (36.2 °C)-98.1 °F (36.7 °C)] 98 °F (36.7 °C)  Pulse:  [64-97] 75  Resp:  [17-20] 18  SpO2:  [97 %-98 %] 98 %  BP: (137-167)/(63-75) 167/75     Weight: 67.4 kg (148 lb 9.4 oz)  Body mass index is 23.98 kg/m².    Intake/Output Summary (Last 24 hours) at 8/7/2022 1323  Last data filed at 8/6/2022 1848  Gross per 24 hour   Intake 42.23 ml   Output --   Net 42.23 ml      Physical Exam  Constitutional:       General: She is not in acute distress.     Appearance: She is well-developed.   HENT:      Head: Normocephalic and atraumatic.   Eyes:      Conjunctiva/sclera: Conjunctivae normal.      Pupils: Pupils are equal, round, and reactive to light.   Cardiovascular:      Rate and Rhythm: Normal rate and regular rhythm.      Heart sounds: Normal heart sounds.   Pulmonary:      Effort: Pulmonary effort is normal. No respiratory distress.      Breath sounds: Normal breath sounds.   Abdominal:      General: Bowel sounds are normal. There is no distension.       Palpations: Abdomen is soft.   Musculoskeletal:         General: Tenderness (left shoulder) present. Normal range of motion.      Cervical back: Normal range of motion and neck supple.   Skin:     General: Skin is warm and dry.   Neurological:      General: No focal deficit present.      Mental Status: She is alert and oriented to person, place, and time.   Psychiatric:         Mood and Affect: Mood is anxious.         Behavior: Behavior normal.         Thought Content: Thought content normal.       Significant Labs: All pertinent labs within the past 24 hours have been reviewed.  CBC:   Recent Labs   Lab 08/06/22  0503   WBC 5.67   HGB 11.5*   HCT 34.4*        CMP:   Recent Labs   Lab 08/06/22  0503 08/06/22  1247 08/07/22  0406     --  139   K 3.7  --  3.6     --  104   CO2 25  --  25   *  --  102   BUN 14  --  12   CREATININE 1.1  --  0.9   CALCIUM 7.9* 8.0* 8.5*   PROT 6.8  --  6.5   ALBUMIN 3.3*  --  3.1*   BILITOT 0.6  --  0.6   ALKPHOS 53*  --  48*   AST 14  --  15   ALT 15  --  14   ANIONGAP 12  --  10       Significant Imaging: I have reviewed all pertinent imaging results/findings within the past 24 hours.      Assessment/Plan:      * Hypocalcemia  Repleted; monitor and replete as needed.      Weakness of both lower extremities  C/o intermittent episodes of LE weakness accompanied by N/T  MRI L-spine-reviewed; neurosurgery consulted  Replete calcium  Neurology consult  PT/OT consult  Fall precautions      Postoperative hypothyroidism  Patient has chronic hypothyroidism. TFTs reviewed-   Lab Results   Component Value Date    TSH <0.010 (L) 08/06/2022   . Will continue chronic levothyroxine and adjust for and clinical changes.  Synthroid recently decreased.  Continue current dosage.      RONNIE (generalized anxiety disorder), 2019  Chronic; continue chronic SNRI    Type 2 diabetes mellitus, without long-term current use of insulin, dx 3/2020  Patient's FSGs are controlled on current  medication regimen.  Last A1c reviewed-   Lab Results   Component Value Date    HGBA1C 5.8 (H) 04/06/2022     Most recent fingerstick glucose reviewed-   Recent Labs   Lab 08/06/22  1947   POCTGLUCOSE 99     Current correctional scale  Medium  Maintain anti-hyperglycemic dose as follows-   Antihyperglycemics (From admission, onward)            Start     Stop Route Frequency Ordered    08/06/22 1021  insulin aspart U-100 pen 1-10 Units         -- SubQ Before meals & nightly PRN 08/06/22 1003        Hold Oral hypoglycemics while patient is in the hospital.    GERD (gastroesophageal reflux disease)  Chronic; continue chronic PPI      PAD (peripheral artery disease)  Chronic issue; continue chronic statin.    Hypertension associated with diabetes  Chronic, controlled.  Latest blood pressure and vitals reviewed-   Temp:  [97.1 °F (36.2 °C)-98.1 °F (36.7 °C)]   Pulse:  [64-97]   Resp:  [17-20]   BP: (137-167)/(63-75)   SpO2:  [97 %-98 %] .   Home meds for hypertension were reviewed and noted below.   Hypertension Medications             amLODIPine (NORVASC) 2.5 MG tablet Take 1 tablet by mouth once daily    losartan (COZAAR) 100 MG tablet Take 1 tablet by mouth once daily          While in the hospital, will manage blood pressure as follows; Continue home antihypertensive regimen    Will utilize p.r.n. blood pressure medication only if patient's blood pressure greater than  180/110 and she develops symptoms such as worsening chest pain or shortness of breath.      Hyperlipidemia associated with type 2 diabetes mellitus, baseline    Patient is chronically on statin.will continue for now. Monitor clinically. Last LDL was   Lab Results   Component Value Date    LDLCALC 97.4 04/06/2022            VTE Risk Mitigation (From admission, onward)         Ordered     enoxaparin injection 40 mg  Daily         08/06/22 1003     IP VTE HIGH RISK PATIENT  Once         08/06/22 1003     Place sequential compression device  Until  discontinued         08/06/22 1003                Discharge Planning   KIKA:      Code Status: Full Code   Is the patient medically ready for discharge?:     Reason for patient still in hospital (select all that apply): Patient trending condition, Treatment, Consult recommendations, PT / OT recommendations and Pending disposition  Discharge Plan A: Home                  KAREN Madsen  Department of Hospital Medicine   [facility]

## 2022-08-07 NOTE — CONSULTS
[facility]  Department of Neurology  Neurology Progress Note        PATIENT NAME: Erica Masterson  MRN: [id number]  CSN: [id number]      TODAY'S DATE: 08/07/2022  ADMIT DATE: 8/6/2022                                 Patient Location:  [facility]       Interval History:  77 y/o female who presented to the ED with complaints of pain and generalized weakness. She reports that she usually is in an exercise group that bicycled every M,W,F. She states that over the last year since she has had her thyroid (September 2021) removed she has had worsening burning and weakness in her legs. She was on gabapentin then changed to cymbalta for this. She reports that last PM was the first time that she had difficulty ambulating due to it. She reports that when she takes the calcium given to her in the hospital her legs improve for about 30 mins then start to hurt again.  Patient TSH <0.0.14 on levothyroxine, Ca 7.9, Alb 3.3,  on admission.    Today,patient seen with Dr. Willis who evaluated and examined the patient. Patient reports this afternoon numbness on her whole left side to Dr. Willis that she reports as occurring since her surgery from her neck down to her foot. She is with lower extremity weakness.Dr. Willis would like an ECHO, Carotid US, and MRI/MRA with these new reports  Review of her MRI lumbar spine patient will need PT/OT and if condition worsens with Neurosurgery.         DIAGNOSES:     Active Hospital Problems    Diagnosis  POA    *Hypocalcemia [E83.51]  Yes    Weakness of both lower extremities [R29.898]  Yes    Postoperative hypothyroidism [E89.0]  Yes     Chronic    RONNIE (generalized anxiety disorder), 2019 [F41.1]  Yes     Chronic    Type 2 diabetes mellitus, without long-term current use of insulin, dx 3/2020 [E11.9]  Yes     Chronic    GERD (gastroesophageal reflux disease) [K21.9]  Yes    PAD (peripheral artery disease) [I73.9]  Yes     Chronic    Hypertension associated with  diabetes [E11.59, I15.2]  Yes     Chronic    Hyperlipidemia associated with type 2 diabetes mellitus, baseline  [E11.69, E78.5]  Yes     Chronic      Resolved Hospital Problems   No resolved problems to display.       PRINCIPLE PROBLEM:  Hypocalcemia    HISTORY OF PRESENT ILLNESS:  No notes on file       PAST MEDICAL HISTORY   Past Medical History:   Diagnosis Date    Adenomatous polyp of colon 3/26/2012    Allergy     Anxiety     Cancer     Cataract     Colon polyp     Degenerative arthritis of knee 04/03/2012    Diverticulosis     Encounter for blood transfusion     GERD (gastroesophageal reflux disease)     History of thyroid cancer 2021    Hyperlipidemia     Hypertension     Lateral meniscal tear 5/20/2013    Multinodular goiter 03/26/2012    Myopathy, unspecified 01/18/2010    Tuberculosis         PAST SURGICAL HISTORY    Past Surgical History:   Procedure Laterality Date    BREAST BIOPSY Left 2015    benign    CHOLECYSTECTOMY      COLONOSCOPY  12/2/15    Dr. Britton, multiple polyps, recheck five years-three years if more than 2 polyps are adenomatous    COLONOSCOPY N/A 12/2/2015    COLONOSCOPY N/A 3/20/2019    Procedure: COLONOSCOPY;  Surgeon: Jose Britton MD;  Location: [facility];  Service: Endoscopy;  Laterality: N/A;    COLONOSCOPY N/A 5/20/2020    Dr. Britton; internal hemorrhoids; diverticulosis; polyps removed; repeat in 3 years    CYSTOSCOPY N/A 8/26/2020    Procedure: CYSTOSCOPY;  Surgeon: Charlotte Walters Jr., MD;  Location: [facility] OR;  Service: Urology;  Laterality: N/A;    DISSECTION OF NECK Left 9/13/2021    Procedure: DISSECTION, NECK;  Surgeon: Ryan Torres MD;  Location: [address];  Service: ENT;  Laterality: Left;    ESOPHAGOGASTRODUODENOSCOPY N/A 5/20/2020    Dr. Britton; small hiatal hernia; gastritis; 8 gastric polyps removed    ESOPHAGOGASTRODUODENOSCOPY N/A 4/1/2022    Procedure: EGD (ESOPHAGOGASTRODUODENOSCOPY);  Surgeon: Jose Britton MD;   Location: [facility] ENDO;  Service: Endoscopy;  Laterality: N/A;    FOOT SURGERY      HYSTERECTOMY      TVH/BSO > 20 years    INTRALUMINAL GASTROINTESTINAL TRACT IMAGING VIA CAPSULE N/A 6/4/2020    Procedure: IMAGING PROCEDURE, GI TRACT, INTRALUMINAL, VIA CAPSULE;  Surgeon: Jose Britton MD;  Location: [facility] ENDO;  Service: Endoscopy;  Laterality: N/A;    KNEE SURGERY  06/06/2013    right knee tear Dr Iverson     LAPAROSCOPIC CHOLECYSTECTOMY N/A 9/17/2020    Procedure: CHOLECYSTECTOMY, LAPAROSCOPIC;  Surgeon: Forrest Veronica MD;  Location: [facility] OR;  Service: General;  Laterality: N/A;    LUNG LOBECTOMY  1966    right middle lobectomy, due to Tb    OOPHORECTOMY      SHOULDER SURGERY      francis     THYROIDECTOMY Bilateral 5/19/2021    Procedure: THYROIDECTOMY;  Surgeon: Jamia Amezquita MD;  Location: [facility] OR 2ND FLR;  Service: General;  Laterality: Bilateral;    THYROIDECTOMY Bilateral 9/13/2021    Procedure: THYROIDECTOMY WITH INTRA-OP PTH;  Surgeon: Ryan Torres MD;  Location: [facility] OR 2ND FLR;  Service: ENT;  Laterality: Bilateral;  NIM tube  Intraop PTH        FAMILY HISTORY    Family History   Problem Relation Age of Onset    Colon cancer Other     Cancer Mother     Hypertension Mother     Hyperlipidemia Mother     Cancer Brother     Hypertension Father     Emphysema Father     Diabetes Son     Hypertension Son     Alcohol abuse Son     Diabetes Maternal Aunt     Alzheimer's disease Maternal Uncle     Cancer Maternal Grandmother     No Known Problems Daughter     Dementia Sister     Alzheimer's disease Sister     Breast cancer Sister 60    Obesity Paternal Uncle     Prostate cancer Other     Cancer Brother     Melanoma Neg Hx     Psoriasis Neg Hx     Lupus Neg Hx     Eczema Neg Hx     Amblyopia Neg Hx     Blindness Neg Hx     Cataracts Neg Hx     Glaucoma Neg Hx     Macular degeneration Neg Hx     Retinal detachment Neg Hx     Strabismus Neg Hx     Stroke Neg Hx      Thyroid cancer Neg Hx     Colon polyps Neg Hx     Ulcerative colitis Neg Hx     Stomach cancer Neg Hx     Rectal cancer Neg Hx         SOCIAL HISTORY    Social History     Tobacco Use    Smoking status: Never Smoker    Smokeless tobacco: Never Used   Substance Use Topics    Alcohol use: Not Currently     Comment: stopped 2019    Drug use: No               ALLERGIES  Review of patient's allergies indicates:  No Known Allergies       REVIEW OF SYSTEMS  CARDIOVASCULAR: neg  RESPIRATORY: neg  : neg  GI: neg  MUSCULOSKELETAL: left sided weakness intermittent since September  NEURO: numbness, tingling, left side weakness intermittent since September  EYES: neg    All other systems reviewed and are negative.       OBJECTIVE     VITAL SIGNS (Most Recent)  Temp: 97.8 °F (36.6 °C) (08/07/22 0504)  Pulse: 64 (08/07/22 0504)  Resp: 20 (08/07/22 0504)  BP: (!) 148/67 (08/07/22 0504)  SpO2: 98 % (08/07/22 0504)      MOST RECENT RECORDED NIH        Stroke Scales      INPATIENT MEDICATIONS  Scheduled Meds:   amLODIPine  2.5 mg Oral Daily    aspirin  81 mg Oral Daily    atorvastatin  40 mg Oral QHS    baclofen  10 mg Oral TID    calcium carbonate  2 tablet Oral TID    calcium gluconate IVPB  1 g Intravenous Once    clotrimazole  10 mg Oral 5x Daily    DULoxetine  30 mg Oral Daily    enoxaparin  40 mg Subcutaneous Daily    levothyroxine  88 mcg Oral Before breakfast    losartan  100 mg Oral Daily    pantoprazole  40 mg Oral Daily    polyethylene glycol  17 g Oral Daily     Continuous Infusions:  PRN Meds:.acetaminophen, albuterol-ipratropium, aluminum-magnesium hydroxide-simethicone, artificial tears, dextrose 50%, dextrose 50%, glucagon (human recombinant), glucose, glucose, insulin aspart U-100, magnesium oxide, magnesium oxide, melatonin, naloxone, ondansetron, potassium bicarbonate, potassium bicarbonate, potassium bicarbonate, potassium, sodium phosphates, potassium, sodium phosphates, potassium,  sodium phosphates, simethicone, sodium chloride 0.9%      IMAGING    MRI of lumbar spine ordered    CT Head Without Contrast Results for orders placed during the hospital encounter of 07/30/21      CT Head Without Contrast    Narrative  EXAMINATION:CT HEAD WITHOUT CONTRAST    CLINICAL HISTORY:  Seizure, nontraumatic (Age => 39y);    TECHNIQUE:  Routine unenhanced axial images were obtained through the head.  Sagittal and coronal reformatted images were created.  The study is reviewed in bone and soft tissue windows.    COMPARISON:  None    FINDINGS:  Intracranial contents: There is no acute or significant intracranial abnormality.  There is only mild volume loss and nonspecific white matter change.  A punctate well-defined hypodensity in the right centrum semiovale may represent a remote lacunar type infarction versus prominent perivascular space.  The gray-white interface is preserved without acute cortical infarction.  There is no abnormal extra-axial fluid collection.  There is no hemorrhage, mass or mass effect.  The basilar cisterns are open.  The cerebellar tonsils are normal position.  Sellar structures are normal.  The orbits are grossly normal.    Extracranial contents, calvarium, soft tissues: The included paranasal sinuses and mastoid air cells are clear.  There is no fracture.  There is incomplete fusion of the posterior arch of C1 which represents normal developmental variation.    Impression  1. There is no acute abnormality.  Note: Preliminary results were provided by Dr. Destiny Encinas ([facility]).  There is no significant discrepancy.      Electronically signed by: Yaon Duarte MD  Date:    07/30/2021  Time:    06:50      CT Head With Contrast No results found for this or any previous visit.      CT Head With & Without Contrast No results found for this or any previous visit.      CTA Head No results found for this or any previous visit.      CTA Head and Neck No results found for this or any  previous visit.      MRI Brain Without Contrast No results found for this or any previous visit.      MRI Brain With Contrast No results found for this or any previous visit.      MRI Brain With & Without Contrast No results found for this or any previous visit.      CXR 1 View Results for orders placed during the hospital encounter of 08/04/22    X-Ray Chest 1 View    Narrative  EXAMINATION:  XR CHEST 1 VIEW    CLINICAL HISTORY:  shortness of breath;    TECHNIQUE:  Single frontal view of the chest was performed.    COMPARISON:  03/21/2022    FINDINGS:  Chronic elevation right hemidiaphragm.  No airspace disease.  Normal size heart.  Aortic arch atherosclerosis.  No pleural effusion or pneumothorax.  Cholecystectomy.  Additional surgical clips project over the lower neck.    Impression  No acute cardiopulmonary abnormality.      Electronically signed by: Antony Girard  Date:    08/04/2022  Time:    18:09        CARDIAC STUDIES  Results for orders placed or performed during the hospital encounter of 08/04/22   EKG 12-lead    Collection Time: 08/04/22  5:25 PM    Narrative    Test Reason : R06.02,    Vent. Rate : 065 BPM     Atrial Rate : 065 BPM     P-R Int : 146 ms          QRS Dur : 086 ms      QT Int : 462 ms       P-R-T Axes : 059 045 048 degrees     QTc Int : 480 ms    Normal sinus rhythm  Normal ECG  When compared with ECG of 19-JUL-2022 02:04,  No significant change was found    Referred By: [email or web address]   SELF           Confirmed By:      Results for orders placed or performed in visit on 12/03/19   Echo   Result Value Ref Range    Ascending aorta 3.61 cm    STJ 2.46 cm    AV mean gradient 3 mmHg    Ao peak jose 1.15 m/s    Ao VTI 28.15 cm    IVRT 0.13 msec    IVS 1.03 0.6 - 1.1 cm    LA size 3.05 cm    Left Atrium Major Axis 4.55 cm    Left Atrium Minor Axis 4.75 cm    LVIDd 3.27 (A) 3.5 - 6.0 cm    LVIDs 2.02 (A) 2.1 - 4.0 cm    LVOT diameter 1.97 cm    LVOT peak VTI 20.43 cm    Posterior Wall 0.99 0.6 - 1.1  cm    MV Peak A Perry 0.71 m/s    E wave deceleration time 336.88 msec    MV Peak E Perry 0.43 m/s    PV Peak D Perry 0.27 m/s    PV Peak S Perry 0.65 m/s    RA Major Axis 4.30 cm    RA Width 2.38 cm    RVDD 2.74 cm    Sinus 2.64 cm    TAPSE 2.19 cm    TR Max Perry 2.08 m/s    LA WIDTH 2.73 cm    PV PEAK VELOCITY 0.59 cm/s    LV Diastolic Volume 43.07 mL    LV Systolic Volume 13.00 mL    LVOT peak perry 0.85 m/s    FS 38 %    LA volume 32.90 cm3    LV mass 94.67 g    Left Ventricle Relative Wall Thickness 0.61 cm    AV valve area 2.21 cm2    AV Velocity Ratio 0.74     AV index (prosthetic) 0.73     E/A ratio 0.61     Pulm vein S/D ratio 2.41     LVOT area 3.0 cm2    LVOT stroke volume 62.24 cm3    AV peak gradient 5 mmHg    Triscuspid Valve Regurgitation Peak Gradient 17 mmHg    BSA 1.89 m2    Right Atrial Pressure (from IVC) 3 mmHg    TV rest pulmonary artery pressure 20 mmHg    LV Systolic Volume Index 7.0 mL/m2    LV Diastolic Volume Index 23.23 mL/m2    LA Volume Index 17.7 mL/m2    LV Mass Index 51 g/m2         PHYSICAL EXAM  CONSTITUTIONAL:AAOx3  NECK: supple      NEUROLOGICAL EXAMINATION:      CRANIAL NERVES      CN III, IV, VI   Pupils are equal, round, and reactive to light.     MOTOR EXAM   Muscle bulk: normal  Overall muscle tone: normal  Right arm tone: normal  Left arm tone: normal  Right arm pronator drift: absent  Left arm pronator drift: absent  Right leg tone: normal  Left leg tone: normal     Strength   Right anterior tibial: 4/5  Right posterior tibial: 4/5     REFLEXES      Reflexes   Right brachioradialis: 2+  Left brachioradialis: 2+  Right biceps: 2+  Left biceps: 2+  Right triceps: 2+  Left triceps: 2+     SENSORY EXAM   Light touch normal.      GAIT AND COORDINATION      Coordination   Finger to nose coordination: normal       Defer for safety        ASSESSMENT & PLAN        Generalized weakness of Bilateral lower extremities  Replete electrolytes PRN defer to medicine team  Corrected calcium 8.5   MRI  lumbar spine without contrast awaiting results  PT/OT evaluate and treat     Paresthesia of BLE  Request EMG reports (pt had done locally not with [facility]/patient)  Continue Cymbalta 30 mg daily    Left sided numbness and tingling (intermittent)  Clinical Stroke    -MRI Brain:ordered  -MRA Brain:ordered  -CUS-ordered  -ECHO: ordered  -Lipids: chol 177, HDL 70, LDL 97.4 (ordered new)  -A1c: ordered  -Stroke prevention: Atorvastation, ASA, Lovenox  -EEG: no seizure lie behavior reported     Hypocalcemia  Replete  Defer to medicine team     PAD  Continue Atorvastatin, enoxaparin  Start asa 81 mg daily    · DVT prophylaxis with chemo/SCD prophylaxis  · Extensively discussed lifestyle modifications as prophylactic measures for stroke prevention including smoking cessation, adequate blood pressure management, healthy diet and regular exercise.     Patient to follow up with [facility] at [phone] within 3 days from discharge.     Stroke education was provided including stroke risk factors modification and any acute neurological changes including weakness, confusion, visual changes to come straight to the ER.     All questions were answered.                              Beatriz Lynn NP  [facility]  Date of Service: 08/07/2022  7:25 AM

## 2022-08-07 NOTE — SUBJECTIVE & OBJECTIVE
"Interval History: Pt found sitting up at bedside with lunch tray.  States she is have a "rough day" and has no appetite.  Calcium better, but still c/o LE weakness and tightness to left shoulder.  Discussed chronic nature of left shoulder tightness.  She will continue OP follow up with her Orthopedics and physical medicine and rehab;  MRI L-spine reviewed.  Will consult neurosurgery for further recommendations.      Review of Systems   Constitutional:  Positive for appetite change and fatigue. Negative for chills and fever.   HENT:  Negative for congestion.    Eyes:  Negative for photophobia and visual disturbance.   Respiratory:  Negative for cough, shortness of breath and wheezing.    Cardiovascular:  Negative for chest pain and palpitations.   Gastrointestinal:  Negative for abdominal pain, diarrhea, nausea and vomiting.   Genitourinary:  Negative for dysuria and hematuria.   Musculoskeletal:  Positive for gait problem. Negative for neck pain and neck stiffness.   Skin:  Negative for pallor and rash.   Neurological:  Positive for dizziness and numbness. Negative for syncope and weakness.   Psychiatric/Behavioral:  Negative for agitation and confusion.    All other systems reviewed and are negative.  Objective:     Vital Signs (Most Recent):  Temp: 98 °F (36.7 °C) (08/07/22 1215)  Pulse: 75 (08/07/22 1215)  Resp: 18 (08/07/22 1215)  BP: (!) 167/75 (08/07/22 1215)  SpO2: 98 % (08/07/22 1215)   Vital Signs (24h Range):  Temp:  [97.1 °F (36.2 °C)-98.1 °F (36.7 °C)] 98 °F (36.7 °C)  Pulse:  [64-97] 75  Resp:  [17-20] 18  SpO2:  [97 %-98 %] 98 %  BP: (137-167)/(63-75) 167/75     Weight: 67.4 kg (148 lb 9.4 oz)  Body mass index is 23.98 kg/m².    Intake/Output Summary (Last 24 hours) at 8/7/2022 1323  Last data filed at 8/6/2022 1848  Gross per 24 hour   Intake 42.23 ml   Output --   Net 42.23 ml      Physical Exam  Constitutional:       General: She is not in acute distress.     Appearance: She is well-developed. "   HENT:      Head: Normocephalic and atraumatic.   Eyes:      Conjunctiva/sclera: Conjunctivae normal.      Pupils: Pupils are equal, round, and reactive to light.   Cardiovascular:      Rate and Rhythm: Normal rate and regular rhythm.      Heart sounds: Normal heart sounds.   Pulmonary:      Effort: Pulmonary effort is normal. No respiratory distress.      Breath sounds: Normal breath sounds.   Abdominal:      General: Bowel sounds are normal. There is no distension.      Palpations: Abdomen is soft.   Musculoskeletal:         General: Tenderness (left shoulder) present. Normal range of motion.      Cervical back: Normal range of motion and neck supple.   Skin:     General: Skin is warm and dry.   Neurological:      General: No focal deficit present.      Mental Status: She is alert and oriented to person, place, and time.   Psychiatric:         Mood and Affect: Mood is anxious.         Behavior: Behavior normal.         Thought Content: Thought content normal.       Significant Labs: All pertinent labs within the past 24 hours have been reviewed.  CBC:   Recent Labs   Lab 08/06/22  0503   WBC 5.67   HGB 11.5*   HCT 34.4*        CMP:   Recent Labs   Lab 08/06/22  0503 08/06/22  1247 08/07/22  0406     --  139   K 3.7  --  3.6     --  104   CO2 25  --  25   *  --  102   BUN 14  --  12   CREATININE 1.1  --  0.9   CALCIUM 7.9* 8.0* 8.5*   PROT 6.8  --  6.5   ALBUMIN 3.3*  --  3.1*   BILITOT 0.6  --  0.6   ALKPHOS 53*  --  48*   AST 14  --  15   ALT 15  --  14   ANIONGAP 12  --  10       Significant Imaging: I have reviewed all pertinent imaging results/findings within the past 24 hours.

## 2022-08-07 NOTE — PT/OT/SLP EVAL
Physical Therapy Evaluation    Patient Name:  Erica Masterson   MRN:  [id number]    Recommendations:     Discharge Recommendations:  home   Discharge Equipment Recommendations:  (TBD (probably none))   Barriers to discharge: None    Assessment:     Erica Masterson is a 76 y.o. female admitted with a medical diagnosis of Hypocalcemia.  She presents with the following impairments/functional limitations:  weakness, impaired endurance, impaired balance, gait instability, impaired functional mobility, decreased lower extremity function  .    Rehab Prognosis: Good; patient would benefit from acute skilled PT services to address these deficits and reach maximum level of function.    Recent Surgery: * No surgery found *      Plan:     During this hospitalization, patient to be seen 6 x/week to address the identified rehab impairments via gait training, therapeutic activities, therapeutic exercises and progress toward the following goals:    · Plan of Care Expires:  08/08/22    Subjective     Patient/Family Comments/goals: agrees to work with PT to walk in [place]    Living Environment:  House with spouse, 11 steps (with rail) to enter, also have elevator  Prior to admission, patients level of function was independent without AD.  Equipment used at home: none.  DME owned (not currently used): none.  Upon discharge, patient will have assistance from .    Objective:     Communicated with RN (Hayden) prior to session.  Patient found supine with peripheral IV  upon PT entry to room.    General Precautions: Standard, fall   Orthopedic Precautions:N/A   Braces: N/A  Respiratory Status: Room air    Functional Mobility training:  · Bed Mobility:  Rolling Left:  stand by assistance  · Supine to Sit: stand by assistance  · Sit to Supine: stand by assistance  · Transfers:     · Sit to Stand:  stand by assistance with no AD  · Gait: 150' with SBA (and assist for IV pole);  patient reported feeling a bit wobbly and nauseated toward end,  but declines use of walker    Therapeutic Activities and Exercises:   mobility training as above with cues for technique  Rx somewhat limited due to nausea.    AM-PAC 6 CLICK MOBILITY  Total Score:21     Patient left supine with all lines intact and call button in reach.    GOALS:   Multidisciplinary Problems     Physical Therapy Goals        Problem: Physical Therapy    Goal Priority Disciplines Outcome Goal Variances Interventions   Physical Therapy Goal     PT, PT/OT Ongoing, Progressing     Description: Goals to be met by: 8/15/2022     Patient will increase functional independence with mobility by performin). Supine to sit with Modified [place]  2). Sit to supine with Modified [place]  3). Sit to stand transfer with Modified [place]  4). Gait  x > 200 feet with Modified [place]                      History:     Past Medical History:   Diagnosis Date    Adenomatous polyp of colon 3/26/2012    Allergy     Anxiety     Cancer     Cataract     Colon polyp     Degenerative arthritis of knee 2012    Diverticulosis     Encounter for blood transfusion     GERD (gastroesophageal reflux disease)     History of thyroid cancer     Hyperlipidemia     Hypertension     Lateral meniscal tear 2013    Multinodular goiter 2012    Myopathy, unspecified 2010    Tuberculosis        Past Surgical History:   Procedure Laterality Date    BREAST BIOPSY Left     benign    CHOLECYSTECTOMY      COLONOSCOPY  12/2/15    Dr. Britton, multiple polyps, recheck five years-three years if more than 2 polyps are adenomatous    COLONOSCOPY N/A 2015    COLONOSCOPY N/A 3/20/2019    Procedure: COLONOSCOPY;  Surgeon: Jose Britton MD;  [facility];  Service: Endoscopy;  Laterality: N/A;    COLONOSCOPY N/A 2020    Dr. Britton; internal hemorrhoids; diverticulosis; polyps removed; repeat in 3 years    CYSTOSCOPY N/A 2020    Procedure: CYSTOSCOPY;   Surgeon: Charlotte Walters Jr., MD;  Location: [facility];  Service: Urology;  Laterality: N/A;    DISSECTION OF NECK Left 9/13/2021    Procedure: DISSECTION, NECK;  Surgeon: Ryan Torres MD;  [facility];  Service: ENT;  Laterality: Left;    ESOPHAGOGASTRODUODENOSCOPY N/A 5/20/2020    Dr. Britton; small hiatal hernia; gastritis; 8 gastric polyps removed    ESOPHAGOGASTRODUODENOSCOPY N/A 4/1/2022    Procedure: EGD (ESOPHAGOGASTRODUODENOSCOPY);  Surgeon: Jose Britton MD;  Location: [facility] ENDO;  Service: Endoscopy;  Laterality: N/A;    FOOT SURGERY      HYSTERECTOMY      TVH/BSO > 20 years    INTRALUMINAL GASTROINTESTINAL TRACT IMAGING VIA CAPSULE N/A 6/4/2020    Procedure: IMAGING PROCEDURE, GI TRACT, INTRALUMINAL, VIA CAPSULE;  Surgeon: Jose Britton MD;  Location: [facility] ENDO;  Service: Endoscopy;  Laterality: N/A;    KNEE SURGERY  06/06/2013    right knee tear Dr Iverson     LAPAROSCOPIC CHOLECYSTECTOMY N/A 9/17/2020    Procedure: CHOLECYSTECTOMY, LAPAROSCOPIC;  Surgeon: Forrest Veronica MD;  Location: [facility];  Service: General;  Laterality: N/A;    LUNG LOBECTOMY  1966    right middle lobectomy, due to Tb    OOPHORECTOMY      SHOULDER SURGERY      francis     THYROIDECTOMY Bilateral 5/19/2021    Procedure: THYROIDECTOMY;  Surgeon: Jamia Amezquita MD;  Location: [facility] OR [address] [place] [address];  Service: General;  Laterality: Bilateral;    THYROIDECTOMY Bilateral 9/13/2021    Procedure: THYROIDECTOMY WITH INTRA-OP PTH;  Surgeon: Ryan Torres MD;  [facility];  Service: ENT;  Laterality: Bilateral;  NIM tube  Intraop PTH       Time Tracking:     PT Received On: 08/07/22  PT Start Time: 0840     PT Stop Time: 0848  PT Total Time (min): 8 min     Billable Minutes: Evaluation 8      08/07/2022

## 2022-08-07 NOTE — ASSESSMENT & PLAN NOTE
C/o intermittent episodes of LE weakness accompanied by N/T  MRI L-spine-reviewed; neurosurgery consulted  Replete calcium  Neurology consult  PT/OT consult  Fall precautions

## 2022-08-07 NOTE — ASSESSMENT & PLAN NOTE
Chronic, controlled.  Latest blood pressure and vitals reviewed-   Temp:  [97.1 °F (36.2 °C)-98.1 °F (36.7 °C)]   Pulse:  [64-97]   Resp:  [17-20]   BP: (137-167)/(63-75)   SpO2:  [97 %-98 %] .   Home meds for hypertension were reviewed and noted below.   Hypertension Medications             amLODIPine (NORVASC) 2.5 MG tablet Take 1 tablet by mouth once daily    losartan (COZAAR) 100 MG tablet Take 1 tablet by mouth once daily          While in the hospital, will manage blood pressure as follows; Continue home antihypertensive regimen    Will utilize p.r.n. blood pressure medication only if patient's blood pressure greater than  180/110 and she develops symptoms such as worsening chest pain or shortness of breath.

## 2022-08-07 NOTE — ASSESSMENT & PLAN NOTE
Patient's FSGs are controlled on current medication regimen.  Last A1c reviewed-   Lab Results   Component Value Date    HGBA1C 5.8 (H) 04/06/2022     Most recent fingerstick glucose reviewed-   Recent Labs   Lab 08/06/22  1947   POCTGLUCOSE 99     Current correctional scale  Medium  Maintain anti-hyperglycemic dose as follows-   Antihyperglycemics (From admission, onward)            Start     Stop Route Frequency Ordered    08/06/22 1021  insulin aspart U-100 pen 1-10 Units         -- SubQ Before meals & nightly PRN 08/06/22 1003        Hold Oral hypoglycemics while patient is in the hospital.

## 2022-08-07 NOTE — PLAN OF CARE
POC/Meds reviewed, pt verbalized understanding. Vitals stable. Afebrile. Tele In place-[id number]. Blood glucose monitored. Pt complaining of tingling/ numbness to BLE and left side of the face. Andrés notified. Ionized calcium ordered. Repositions self. Hourly/Q2hr rounding performed, safety maintained. Bed in lowest position, wheels locked, SR up x2, call light in easy reach. No  complaints at this time.

## 2022-08-07 NOTE — CARE UPDATE
08/07/22 0758   Patient Assessment/Suction   Level of Consciousness (AVPU) alert   Respiratory Effort Unlabored;Normal   Expansion/Accessory Muscles/Retractions no use of accessory muscles;no retractions   All Lung Fields Breath Sounds equal bilaterally   Rhythm/Pattern, Respiratory unlabored;pattern regular   PRE-TX-O2   O2 Device (Oxygen Therapy) room air   SpO2 98 %   Pulse Oximetry Type Intermittent   $ Pulse Oximetry - Multiple Charge Pulse Oximetry - Multiple   Pulse 68   Resp 17   Aerosol Therapy   $ Aerosol Therapy Charges PRN treatment not required   Respiratory Treatment Status (SVN) PRN treatment not required

## 2022-08-07 NOTE — PLAN OF CARE
Problem: Physical Therapy  Goal: Physical Therapy Goal  Description: Goals to be met by: 8/15/2022     Patient will increase functional independence with mobility by performin). Supine to sit with Modified [place]  2). Sit to supine with Modified [place]  3). Sit to stand transfer with Modified [place]  4). Gait  x > 200 feet with Modified [place]     Outcome: Ongoing, Progressing

## 2022-08-08 ENCOUNTER — TELEPHONE (OUTPATIENT)
Dept: PHYSICAL MEDICINE AND REHAB | Facility: CLINIC | Age: 77
End: 2022-08-08
Payer: MEDICARE

## 2022-08-08 LAB
ALBUMIN SERPL BCP-MCNC: 3.2 G/DL (ref 3.5–5.2)
ALP SERPL-CCNC: 49 U/L (ref 55–135)
ALT SERPL W/O P-5'-P-CCNC: 15 U/L (ref 10–44)
ANION GAP SERPL CALC-SCNC: 12 MMOL/L (ref 8–16)
AORTIC ROOT ANNULUS: 2.81 CM
AORTIC VALVE CUSP SEPERATION: 1.78 CM
AST SERPL-CCNC: 14 U/L (ref 10–40)
AV INDEX (PROSTH): 0.75
AV MEAN GRADIENT: 5 MMHG
AV PEAK GRADIENT: 9 MMHG
AV VALVE AREA: 2.28 CM2
AV VELOCITY RATIO: 0.68
BILIRUB SERPL-MCNC: 0.6 MG/DL (ref 0.1–1)
BSA FOR ECHO PROCEDURE: 1.77 M2
BUN SERPL-MCNC: 9 MG/DL (ref 8–23)
CALCIUM SERPL-MCNC: 8.9 MG/DL (ref 8.7–10.5)
CHLORIDE SERPL-SCNC: 102 MMOL/L (ref 95–110)
CHOLEST SERPL-MCNC: 154 MG/DL (ref 120–199)
CHOLEST/HDLC SERPL: 2.6 {RATIO} (ref 2–5)
CO2 SERPL-SCNC: 24 MMOL/L (ref 23–29)
CREAT SERPL-MCNC: 0.9 MG/DL (ref 0.5–1.4)
CV ECHO LV RWT: 0.39 CM
DOP CALC AO PEAK VEL: 1.51 M/S
DOP CALC AO VTI: 31.9 CM
DOP CALC LVOT AREA: 3 CM2
DOP CALC LVOT DIAMETER: 1.97 CM
DOP CALC LVOT PEAK VEL: 1.02 M/S
DOP CALC LVOT STROKE VOLUME: 72.78 CM3
DOP CALC MV VTI: 26.64 CM
DOP CALCLVOT PEAK VEL VTI: 23.89 CM
E WAVE DECELERATION TIME: 320.07 MSEC
E/A RATIO: 0.7
E/E' RATIO: 8.75 M/S
ECHO LV POSTERIOR WALL: 0.79 CM (ref 0.6–1.1)
EJECTION FRACTION: 65 %
EST. GFR  (NO RACE VARIABLE): >60 ML/MIN/1.73 M^2
ESTIMATED AVG GLUCOSE: 126 MG/DL (ref 68–131)
FRACTIONAL SHORTENING: 36 % (ref 28–44)
GLUCOSE SERPL-MCNC: 108 MG/DL (ref 70–110)
HBA1C MFR BLD: 6 % (ref 4–5.6)
HDLC SERPL-MCNC: 59 MG/DL (ref 40–75)
HDLC SERPL: 38.3 % (ref 20–50)
INTERVENTRICULAR SEPTUM: 0.96 CM (ref 0.6–1.1)
LA MAJOR: 3.33 CM
LA MINOR: 3.8 CM
LA WIDTH: 3.28 CM
LDLC SERPL CALC-MCNC: 86.4 MG/DL (ref 63–159)
LEFT ATRIUM SIZE: 2.89 CM
LEFT ATRIUM VOLUME INDEX: 16.2 ML/M2
LEFT ATRIUM VOLUME: 28.6 CM3
LEFT INTERNAL DIMENSION IN SYSTOLE: 2.56 CM (ref 2.1–4)
LEFT VENTRICLE DIASTOLIC VOLUME INDEX: 40.55 ML/M2
LEFT VENTRICLE DIASTOLIC VOLUME: 71.37 ML
LEFT VENTRICLE MASS INDEX: 61 G/M2
LEFT VENTRICLE SYSTOLIC VOLUME INDEX: 13.4 ML/M2
LEFT VENTRICLE SYSTOLIC VOLUME: 23.57 ML
LEFT VENTRICULAR INTERNAL DIMENSION IN DIASTOLE: 4.03 CM (ref 3.5–6)
LEFT VENTRICULAR MASS: 106.8 G
LV LATERAL E/E' RATIO: 8.75 M/S
LV SEPTAL E/E' RATIO: 8.75 M/S
MAGNESIUM SERPL-MCNC: 1.7 MG/DL (ref 1.6–2.6)
MV MEAN GRADIENT: 1 MMHG
MV PEAK A VEL: 1 M/S
MV PEAK E VEL: 0.7 M/S
MV PEAK GRADIENT: 4 MMHG
MV STENOSIS PRESSURE HALF TIME: 83.78 MS
MV VALVE AREA BY CONTINUITY EQUATION: 2.73 CM2
MV VALVE AREA P 1/2 METHOD: 2.63 CM2
NONHDLC SERPL-MCNC: 95 MG/DL
PHOSPHATE SERPL-MCNC: 4.4 MG/DL (ref 2.7–4.5)
POCT GLUCOSE: 104 MG/DL (ref 70–110)
POCT GLUCOSE: 109 MG/DL (ref 70–110)
POCT GLUCOSE: 111 MG/DL (ref 70–110)
POTASSIUM SERPL-SCNC: 3.5 MMOL/L (ref 3.5–5.1)
PROT SERPL-MCNC: 6.7 G/DL (ref 6–8.4)
PV PEAK VELOCITY: 0.63 CM/S
RA MAJOR: 3.57 CM
RA PRESSURE: 3 MMHG
RA WIDTH: 2.4 CM
RV TISSUE DOPPLER FREE WALL SYSTOLIC VELOCITY 1 (APICAL 4 CHAMBER VIEW): 12.34 CM/S
SODIUM SERPL-SCNC: 138 MMOL/L (ref 136–145)
TDI LATERAL: 0.08 M/S
TDI SEPTAL: 0.08 M/S
TDI: 0.08 M/S
TRICUSPID ANNULAR PLANE SYSTOLIC EXCURSION: 2.51 CM
TRIGL SERPL-MCNC: 43 MG/DL (ref 30–150)

## 2022-08-08 PROCEDURE — 96372 THER/PROPH/DIAG INJ SC/IM: CPT | Performed by: NURSE PRACTITIONER

## 2022-08-08 PROCEDURE — 84100 ASSAY OF PHOSPHORUS: CPT | Performed by: NURSE PRACTITIONER

## 2022-08-08 PROCEDURE — 25000003 PHARM REV CODE 250: Performed by: NURSE PRACTITIONER

## 2022-08-08 PROCEDURE — 83735 ASSAY OF MAGNESIUM: CPT | Performed by: NURSE PRACTITIONER

## 2022-08-08 PROCEDURE — 83036 HEMOGLOBIN GLYCOSYLATED A1C: CPT | Performed by: NURSE PRACTITIONER

## 2022-08-08 PROCEDURE — 97116 GAIT TRAINING THERAPY: CPT | Mod: CQ

## 2022-08-08 PROCEDURE — 80053 COMPREHEN METABOLIC PANEL: CPT | Performed by: NURSE PRACTITIONER

## 2022-08-08 PROCEDURE — 63600175 PHARM REV CODE 636 W HCPCS: Performed by: NURSE PRACTITIONER

## 2022-08-08 PROCEDURE — G0427 PR INPT TELEHEALTH CON 70/>M: ICD-10-PCS | Mod: 95,,, | Performed by: PSYCHIATRY & NEUROLOGY

## 2022-08-08 PROCEDURE — G0427 INPT/ED TELECONSULT70: HCPCS | Mod: 95,,, | Performed by: PSYCHIATRY & NEUROLOGY

## 2022-08-08 PROCEDURE — 36415 COLL VENOUS BLD VENIPUNCTURE: CPT | Performed by: NURSE PRACTITIONER

## 2022-08-08 PROCEDURE — 25000003 PHARM REV CODE 250: Performed by: HOSPITALIST

## 2022-08-08 PROCEDURE — 97165 OT EVAL LOW COMPLEX 30 MIN: CPT

## 2022-08-08 PROCEDURE — G0378 HOSPITAL OBSERVATION PER HR: HCPCS

## 2022-08-08 PROCEDURE — 80061 LIPID PANEL: CPT | Performed by: NURSE PRACTITIONER

## 2022-08-08 PROCEDURE — 94761 N-INVAS EAR/PLS OXIMETRY MLT: CPT

## 2022-08-08 PROCEDURE — 99900035 HC TECH TIME PER 15 MIN (STAT)

## 2022-08-08 RX ADMIN — CLOTRIMAZOLE 10 MG: 10 LOZENGE ORAL; TOPICAL at 10:08

## 2022-08-08 RX ADMIN — CALCIUM CARBONATE (ANTACID) CHEW TAB 500 MG 1000 MG: 500 CHEW TAB at 08:08

## 2022-08-08 RX ADMIN — CALCIUM CARBONATE (ANTACID) CHEW TAB 500 MG 1000 MG: 500 CHEW TAB at 09:08

## 2022-08-08 RX ADMIN — BACLOFEN 10 MG: 10 TABLET ORAL at 02:08

## 2022-08-08 RX ADMIN — ASPIRIN 81 MG CHEWABLE TABLET 81 MG: 81 TABLET CHEWABLE at 08:08

## 2022-08-08 RX ADMIN — DOCUSATE SODIUM 100 MG: 100 CAPSULE, LIQUID FILLED ORAL at 08:08

## 2022-08-08 RX ADMIN — CLOTRIMAZOLE 10 MG: 10 LOZENGE ORAL; TOPICAL at 02:08

## 2022-08-08 RX ADMIN — CLOTRIMAZOLE 10 MG: 10 LOZENGE ORAL; TOPICAL at 05:08

## 2022-08-08 RX ADMIN — ATORVASTATIN CALCIUM 40 MG: 40 TABLET, FILM COATED ORAL at 09:08

## 2022-08-08 RX ADMIN — ENOXAPARIN SODIUM 40 MG: 100 INJECTION SUBCUTANEOUS at 04:08

## 2022-08-08 RX ADMIN — BACLOFEN 10 MG: 10 TABLET ORAL at 08:08

## 2022-08-08 RX ADMIN — CALCIUM CARBONATE (ANTACID) CHEW TAB 500 MG 1000 MG: 500 CHEW TAB at 02:08

## 2022-08-08 RX ADMIN — PANTOPRAZOLE SODIUM 40 MG: 40 TABLET, DELAYED RELEASE ORAL at 08:08

## 2022-08-08 RX ADMIN — LOSARTAN POTASSIUM 100 MG: 25 TABLET, FILM COATED ORAL at 08:08

## 2022-08-08 RX ADMIN — POLYETHYLENE GLYCOL 3350 17 G: 17 POWDER, FOR SOLUTION ORAL at 08:08

## 2022-08-08 RX ADMIN — BACLOFEN 10 MG: 10 TABLET ORAL at 09:08

## 2022-08-08 RX ADMIN — DULOXETINE 30 MG: 30 CAPSULE, DELAYED RELEASE ORAL at 08:08

## 2022-08-08 RX ADMIN — ERYTHROMYCIN: 5 OINTMENT OPHTHALMIC at 09:08

## 2022-08-08 RX ADMIN — AMLODIPINE BESYLATE 2.5 MG: 2.5 TABLET ORAL at 08:08

## 2022-08-08 RX ADMIN — LEVOTHYROXINE SODIUM 88 MCG: 0.09 TABLET ORAL at 05:08

## 2022-08-08 RX ADMIN — CLOTRIMAZOLE 10 MG: 10 LOZENGE ORAL; TOPICAL at 09:08

## 2022-08-08 NOTE — PROGRESS NOTES
[facility]  Progress Note  Date: 2022 8:53 AM            Patient Name: Erica Masterson   MRN: [id number]   : 1945    AGE: 76 y.o.    LOS: 0 days Hospital Day: 3  Admit date: 2022  4:36 AM         HPI per EMR:77 y/o female who presented to the ED with complaints of pain and generalized weakness. She reports that she usually is in an exercise group that bicycled every M,W,F. She states that over the last year since she has had her thyroid (2021) removed she has had worsening burning and weakness in her legs. She was on gabapentin then changed to cymbalta for this. She reports that last PM was the first time that she had difficulty ambulating due to it. She reports that when she takes the calcium given to her in the hospital her legs improve for about 30 mins then start to hurt again.  Patient TSH <0.0.14 on levothyroxine, Ca 7.9, Alb 3.3,  on admission.    Patient reports numbness on her whole left side and she reports this is ongoing since her surgery. Numbness is from her neck down to her foot.     2022: No acute events overnight. Patient was seen and examined by me this morning. Neuro exam is normal.  Patient states that she gets weak and numb all her extremities and this happens mostly when she sleeps and wakes up.  Currently she denies any symptoms.       Vitals:  Patient Vitals for the past 24 hrs:   BP Temp Temp src Pulse Resp SpO2   22 0739 (!) 141/66 98.7 °F (37.1 °C) -- 92 18 96 %   22 0330 (!) 147/70 96.6 °F (35.9 °C) -- 68 20 98 %   22 2354 (!) 142/63 97.1 °F (36.2 °C) -- (!) 58 20 98 %   22 1948 (!) 144/66 97 °F (36.1 °C) -- 60 20 99 %   22 1910 -- -- -- 63 16 98 %   22 1800 -- -- -- -- 18 --   22 1611 (!) 142/67 97.8 °F (36.6 °C) Oral 65 16 97 %   22 1215 (!) 167/75 98 °F (36.7 °C) Oral 75 18 98 %     PHYSICAL EXAM:     GENERAL APPEARANCE: Well-developed, well-nourished female in no acute  distress.  HEENT: Normocephalic and atraumatic. PERRL. Oropharynx unremarkable.  PULM: Comfortable on room air.  CV: RRR.  ABDOMEN: Soft, nontender.  EXTREMITIES: No signs of vascular compromise. Pulses present. No cyanosis, clubbing or edema.  SKIN: Clear; no rashes, lesions or skin breaks in exposed areas.      NEURO:   MENTAL STATUS: Patient awake and oriented to time, place, and person. Affect normal.  CRANIAL NERVES II-XII: Pupils equal, round and reactive to light. Extraocular movements full and intact. No facial asymmetry.  MOTOR: Normal bulk. Tone normal and symmetrical throughout.  No abnormal movements. No tremor.   Strength 5/5 throughout unless specified below.  REFLEXES: DTRs 2+; normal and symmetric throughout.   SENSATION: Sensation grossly intact to fine touch.  COORDINATION: Finger-to-nose normal for age and symmetric.  STATION: Romberg deferred.  GAIT: Deferred.    CURRENT SCHEDULED MEDICATIONS:   amLODIPine  2.5 mg Oral Daily    aspirin  81 mg Oral Daily    atorvastatin  40 mg Oral QHS    baclofen  10 mg Oral TID    calcium carbonate  2 tablet Oral TID    calcium gluconate IVPB  1 g Intravenous Once    clotrimazole  10 mg Oral 5x Daily    docusate sodium  100 mg Oral Daily    DULoxetine  30 mg Oral Daily    enoxaparin  40 mg Subcutaneous Daily    erythromycin   Both Eyes QHS    levothyroxine  88 mcg Oral Before breakfast    losartan  100 mg Oral Daily    pantoprazole  40 mg Oral Daily    polyethylene glycol  17 g Oral Daily     CURRENT INFUSIONS:    DATA:  Recent Labs   Lab 08/04/22  1729 08/06/22  0503 08/06/22  1247 08/07/22  0406 08/08/22  0421    141  --  139 138   K 4.4 3.7  --  3.6 3.5    104  --  104 102   CO2 26 25  --  25 24   BUN 19 14  --  12 9   CREATININE 1.0 1.1  --  0.9 0.9    112*  --  102 108   CALCIUM 8.2* 7.9* 8.0* 8.5* 8.9   PHOS  --  3.7  --  4.4 4.4   MG  --  1.9  --  1.8 1.7   AST 17 14  --  15 14   ALT 15 15  --  14 15     Recent Labs   Lab  08/04/22  1730 08/06/22  0503   WBC 6.42 5.67   HGB 12.5 11.5*   HCT 38.7 34.4*    257     No results found for: PROTEINCSF, GLUCCSF, WBCCSF, RBCCSF  Hemoglobin A1C   Date Value Ref Range Status   04/06/2022 5.8 (H) 4.0 - 5.6 % Final     Comment:     ADA Screening Guidelines:  5.7-6.4%  Consistent with prediabetes  >or=6.5%  Consistent with diabetes    High levels of fetal hemoglobin interfere with the HbA1C  assay. Heterozygous hemoglobin variants (HbS, HgC, etc)do  not significantly interfere with this assay.   However, presence of multiple variants may affect accuracy.     09/13/2021 5.8 (H) 4.0 - 5.6 % Final     Comment:     ADA Screening Guidelines:  5.7-6.4%  Consistent with prediabetes  >or=6.5%  Consistent with diabetes    High levels of fetal hemoglobin interfere with the HbA1C  assay. Heterozygous hemoglobin variants (HbS, HgC, etc)do  not significantly interfere with this assay.   However, presence of multiple variants may affect accuracy.     09/13/2021 5.8 (H) 4.0 - 5.6 % Final     Comment:     ADA Screening Guidelines:  5.7-6.4%  Consistent with prediabetes  >or=6.5%  Consistent with diabetes    High levels of fetal hemoglobin interfere with the HbA1C  assay. Heterozygous hemoglobin variants (HbS, HgC, etc)do  not significantly interfere with this assay.   However, presence of multiple variants may affect accuracy.              I have personally reviewed and interpreted the pertinent imaging and lab results.  Imaging Results    None                 ASSESSMENT AND PLAN:      Numbness and weakness  R/o CVA       Workup  · CTH: No acute intracranial abnormality   · MRI brain:  No acute intracranial abnormality.  · MRA head:  No large vessel occlusion or high-grade stenosis.  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  · CUS:  No evidence of hemodynamically CV neck pain carotid bifurcation stenosis.  Vertebral artery flow appears antegrade  bilaterally.        Plan  · Aspirin 81 mg and Lipitor 40mg for stroke prevention  · Permissive BP to 220 systolic for 24hrs from symptom onset and after that normalize BP  · PT OT  · Speech therapy  · DVT prophylaxis with chemo/SCD prophylaxis  · Discussed lifestyle modifications as prophylactic measures for stroke prevention including, adequate blood pressure management, healthy diet and regular exercise.  · Recommend outpatient sleep study.  · No further neurological workup is needed at this time.  Will sign off, please call with any questions          38 minutes of care time has been spent evaluating with the patient. Time includes chart review not limited to diagnostic imaging, labs, and vitals, patient assessment, discussion with family and nursing, current order evaluations, and new order entries.      Sergio Altman MD  Neurology/vascular Neurology  Date of Service: 08/08/2022  8:53 AM    Please note: This note was transcribed using voice recognition software. Because of this technology there are often uinintended grammatical, spelling, and other transcription errors. Please disregard these errors.

## 2022-08-08 NOTE — CARE UPDATE
08/07/22 1910   Patient Assessment/Suction   Level of Consciousness (AVPU) alert   Respiratory Effort Normal;Unlabored   Expansion/Accessory Muscles/Retractions no retractions;no use of accessory muscles   All Lung Fields Breath Sounds equal bilaterally   Rhythm/Pattern, Respiratory pattern regular   PRE-TX-O2   O2 Device (Oxygen Therapy) room air   SpO2 98 %   Pulse Oximetry Type Intermittent   $ Pulse Oximetry - Multiple Charge Pulse Oximetry - Multiple   Pulse 63   Resp 16   Positioning HOB elevated 30 degrees   Aerosol Therapy   $ Aerosol Therapy Charges PRN treatment not required   Respiratory Treatment Status (SVN) PRN treatment not required

## 2022-08-08 NOTE — CONSULTS
[facility]  Neurosurgery  Consult Note    Inpatient consult to Neurosurgery  Consult performed by: Radha Thurman MD  Consult ordered by: KAREN Baeza  Reason for consult: interminttent lower extremity numbness and difficulty ambulating        Subjective:     Chief Complaint/Reason for Admission: intermittent lower extremity numbness and difficulty ambulating.    History of Present Illness: Erica Masterson is a 76-year-old female with past medical history significant for HTN, HLD, anxiety, and thyroid cancer s/p thyroidectomy. She presented to the ED after difficulty ambulating. This has happened previously and she reports intermittent episodes of numbness to her lower extremities and episodes of difficutly ambulating that usually resolve spontaneously. ED workup was significant for hypocalcemia.  Neurosurgery was consulted for further recommendations regarding these intermittent symptoms.     PTA Medications   Medication Sig    amLODIPine (NORVASC) 2.5 MG tablet Take 1 tablet by mouth once daily    baclofen (LIORESAL) 10 MG tablet Take 1 tablet (10 mg total) by mouth 3 (three) times daily. Start w. Half a tab first 7 days    blood sugar diagnostic (BLOOD GLUCOSE TEST) Strp True Metrix glucose strips check once daily    blood-glucose meter kit True Metrix glucometer check glucose once daily    calcium carbonate (TUMS) 200 mg calcium (500 mg) chewable tablet Chew and swallow 2 tablets (1,000 mg total) by mouth 3 (three) times daily. for 14 days    carboxymethylcellulose sodium (REFRESH OPHT) Apply 1 drop to eye daily as needed.    clotrimazole (MYCELEX) 10 mg osl Take 1 tablet (10 mg total) by mouth 5 (five) times daily. for 10 days    conjugated estrogens (PREMARIN) vaginal cream Place 0.5 g vaginally once daily AND 0.5 g twice a week. (Patient taking differently: Place 0.5 g vaginally once daily AND 0.5 g twice a week. Saturdays and tuesdays)    DULoxetine (CYMBALTA) 30 MG  capsule Take 1 capsule (30 mg total) by mouth once daily.    ergocalciferol (ERGOCALCIFEROL) 50,000 unit Cap Take 1 capsule (50,000 Units total) by mouth every 30 days.    erythromycin (ROMYCIN) ophthalmic ointment Place into the left eye every 4 (four) hours. Place a 1/2 inch ribbon of ointment into the lower eyelid. for 5 days    gabapentin (NEURONTIN) 300 MG capsule Take 1 capsule (300 mg total) by mouth 2 (two) times daily.    lansoprazole (PREVACID) 30 MG capsule Take 30 mg by mouth once daily.    levothyroxine (SYNTHROID) 88 MCG tablet Take 1 tablet (88 mcg total) by mouth before breakfast.    losartan (COZAAR) 100 MG tablet Take 1 tablet by mouth once daily    nystatin (MYCOSTATIN) 100,000 unit/mL suspension Take 4 mLs (400,000 Units total) by mouth 4 (four) times daily. for 10 days    pantoprazole (PROTONIX) 40 MG tablet Take 1 tablet (40 mg total) by mouth once daily.    polyethylene glycol (GLYCOLAX) 17 gram PwPk Take 17 g by mouth once daily.    rosuvastatin (CRESTOR) 20 MG tablet Take 0.5 tablets (10 mg total) by mouth every evening.    simethicone (MYLICON) 125 MG chewable tablet Take 125 mg by mouth every 6 (six) hours as needed for Flatulence.    UNABLE TO FIND I B guard bid       Review of patient's allergies indicates:  No Known Allergies    Past Medical History:   Diagnosis Date    Adenomatous polyp of colon 3/26/2012    Allergy     Anxiety     Cancer     Cataract     Colon polyp     Degenerative arthritis of knee 04/03/2012    Diverticulosis     Encounter for blood transfusion     GERD (gastroesophageal reflux disease)     History of thyroid cancer 2021    Hyperlipidemia     Hypertension     Lateral meniscal tear 5/20/2013    Multinodular goiter 03/26/2012    Myopathy, unspecified 01/18/2010    Tuberculosis      Past Surgical History:   Procedure Laterality Date    BREAST BIOPSY Left 2015    benign    CHOLECYSTECTOMY      COLONOSCOPY  12/2/15    Dr. Britton,  multiple polyps, recheck five years-three years if more than 2 polyps are adenomatous    COLONOSCOPY N/A 12/2/2015    COLONOSCOPY N/A 3/20/2019    Procedure: COLONOSCOPY;  Surgeon: Jose Brittno MD;  Location: [facility];  Service: Endoscopy;  Laterality: N/A;    COLONOSCOPY N/A 5/20/2020    Dr. Britton; internal hemorrhoids; diverticulosis; polyps removed; repeat in 3 years    CYSTOSCOPY N/A 8/26/2020    Procedure: CYSTOSCOPY;  Surgeon: Charlotte Walters Jr., MD;  [facility]: [facility];  Service: Urology;  Laterality: N/A;    DISSECTION OF NECK Left 9/13/2021    Procedure: DISSECTION, NECK;  Surgeon: Ryan Torres MD;  [address];  Service: ENT;  Laterality: Left;    ESOPHAGOGASTRODUODENOSCOPY N/A 5/20/2020    Dr. Britton; small hiatal hernia; gastritis; 8 gastric polyps removed    ESOPHAGOGASTRODUODENOSCOPY N/A 4/1/2022    Procedure: EGD (ESOPHAGOGASTRODUODENOSCOPY);  Surgeon: Jose Britton MD;  Location: [facility];  Service: Endoscopy;  Laterality: N/A;    FOOT SURGERY      HYSTERECTOMY      TVH/BSO > 20 years    INTRALUMINAL GASTROINTESTINAL TRACT IMAGING VIA CAPSULE N/A 6/4/2020    Procedure: IMAGING PROCEDURE, GI TRACT, INTRALUMINAL, VIA CAPSULE;  Surgeon: Jose Britton MD;  Location: [facility];  Service: Endoscopy;  Laterality: N/A;    KNEE SURGERY  06/06/2013    right knee tear Dr Iverson     LAPAROSCOPIC CHOLECYSTECTOMY N/A 9/17/2020    Procedure: CHOLECYSTECTOMY, LAPAROSCOPIC;  Surgeon: Forrest Veronica MD;  [facility];  Service: General;  Laterality: N/A;    LUNG LOBECTOMY  1966    right middle lobectomy, due to Tb    OOPHORECTOMY      SHOULDER SURGERY      francis     THYROIDECTOMY Bilateral 5/19/2021    Procedure: THYROIDECTOMY;  Surgeon: Jamia Amezquita MD;  [address];  Service: General;  Laterality: Bilateral;    THYROIDECTOMY Bilateral 9/13/2021    Procedure: THYROIDECTOMY WITH INTRA-OP PTH;  Surgeon: Ryan Torres MD;  Location: [facility]  OR 2ND FLR;  Service: ENT;  Laterality: Bilateral;  NIM tube  Intraop PTH     Family History     Problem Relation (Age of Onset)    Alcohol abuse Son    Alzheimer's disease Maternal Uncle, Sister    Breast cancer Sister (60)    Cancer Mother, Brother, Maternal Grandmother, Brother    Colon cancer Other    Dementia Sister    Diabetes Son, Maternal Aunt    Emphysema Father    Hyperlipidemia Mother    Hypertension Mother, Father, Son    No Known Problems Daughter    Obesity Paternal Uncle    Prostate cancer Other        Tobacco Use    Smoking status: Never Smoker    Smokeless tobacco: Never Used   Substance and Sexual Activity    Alcohol use: Not Currently     Comment: stopped 2019    Drug use: No    Sexual activity: Not Currently     Review of Systems   Constitutional: Positive for appetite change and fatigue.   Musculoskeletal: Positive for gait problem.   Neurological: Positive for dizziness and numbness.   All other systems reviewed and are negative.    Objective:     Weight: 67.4 kg (148 lb 9.4 oz)  Body mass index is 23.98 kg/m².  Vital Signs (Most Recent):  Temp: 97 °F (36.1 °C) (08/07/22 1948)  Pulse: 60 (08/07/22 1948)  Resp: 20 (08/07/22 1948)  BP: (!) 144/66 (08/07/22 1948)  SpO2: 99 % (08/07/22 1948) Vital Signs (24h Range):  Temp:  [97 °F (36.1 °C)-98 °F (36.7 °C)] 97 °F (36.1 °C)  Pulse:  [60-97] 60  Resp:  [16-20] 20  SpO2:  [97 %-99 %] 99 %  BP: (137-167)/(64-75) 144/66     Date 08/07/22 0700 - 08/08/22 0659   Shift 8388-0484 7000-3269 6814-0714 24 Hour Total   INTAKE   IV Piggyback  49.6  49.6   Shift Total(mL/kg)  49.6(0.7)  49.6(0.7)   OUTPUT   Shift Total(mL/kg)       Weight (kg) 67.4 67.4 67.4 67.4                        Physical Exam:  Vitals reviewed.    Constitutional: She appears well-developed and well-nourished. No distress.     Eyes: Pupils are equal, round, and reactive to light. Conjunctivae and EOM are normal.     Cardiovascular: Normal rate, regular rhythm, normal pulses and no  edema.     Abdominal: Soft. Bowel sounds are normal.     Skin: Skin displays no rash on trunk and no rash on extremities. Skin displays no lesions on trunk and no lesions on extremities.     Psych/Behavior: She is alert. She is oriented to person, place, and time. She has a normal mood and affect.     Musculoskeletal: Gait is normal.        Neck: Range of motion is full. There is no tenderness. Muscle strength is 5/5. Tone is normal.        Back: Range of motion is full. There is no tenderness. Muscle strength is 5/5. Tone is normal.        Right Upper Extremities: Range of motion is full. There is no tenderness. Muscle strength is 5/5. Tone is normal.        Left Upper Extremities: Range of motion is full. There is no tenderness. Muscle strength is 5/5. Tone is normal.       Right Lower Extremities: Range of motion is full. There is no tenderness. Muscle strength is 5/5. Tone is normal.        Left Lower Extremities: Range of motion is full. There is no tenderness. Muscle strength is 5/5. Tone is normal.     Neurological:        Coordination: She has a normal Romberg Test, normal finger to nose coordination and normal tandem walking coordination.        Sensory: There is no sensory deficit in the trunk. There is no sensory deficit in the extremities.        DTRs: DTRs are DTRS NORMAL AND SYMMETRICnormal and symmetric. She displays no Babinski's sign on the right side. She displays no Babinski's sign on the left side.        Cranial nerves: Cranial nerve(s) II, III, IV, V, VI, VII, VIII, IX, X, XI and XII are intact.       Significant Labs:  Recent Labs   Lab 08/06/22  0503 08/06/22  1247 08/07/22  0406   *  --  102     --  139   K 3.7  --  3.6     --  104   CO2 25  --  25   BUN 14  --  12   CREATININE 1.1  --  0.9   CALCIUM 7.9* 8.0* 8.5*   MG 1.9  --  1.8     Recent Labs   Lab 08/06/22  0503   WBC 5.67   HGB 11.5*   HCT 34.4*        No results for input(s): LABPT, INR, APTT in the last 48  hours.  Microbiology Results (last 7 days)     ** No results found for the last 168 hours. **        All pertinent labs from the last 24 hours have been reviewed.     Significant Diagnostics:  She has an MRI of the lumbar spine available for review which I personally reviewed. This shows an L4-5 spondylolisthesis with bilateral facet arthropathy. There is no spinal canal stenosis or neuroforaminal stenosis throughout the lumbar spine.    Assessment/Plan:     Active Diagnoses:    Diagnosis Date Noted POA    PRINCIPAL PROBLEM:  Hypocalcemia [E83.51] 08/06/2022 Yes    Weakness of both lower extremities [R29.898] 01/24/2022 Yes    Postoperative hypothyroidism [E89.0] 07/29/2021 Yes     Chronic    RONNIE (generalized anxiety disorder), 2019 [F41.1] 08/03/2020 Yes     Chronic    Type 2 diabetes mellitus, without long-term current use of insulin, dx 3/2020 [E11.9] 03/24/2020 Yes     Chronic    GERD (gastroesophageal reflux disease) [K21.9] 05/28/2015 Yes    PAD (peripheral artery disease) [I73.9] 01/30/2014 Yes     Chronic    Hypertension associated with diabetes [E11.59, I15.2]  Yes     Chronic    Hyperlipidemia associated with type 2 diabetes mellitus, baseline  [E11.69, E78.5] 03/26/2012 Yes     Chronic      Problems Resolved During this Admission:     Ms. Masterson is a 76-year-old female with intermittent lower extremity paresthesias and difficulty walking. The MRI of the lumbar spine does not explain this presentation. Recommend MRI thoracic to rule out compressive pathology, although compressive pathology highly unlikely to cause intermittent symptoms that resolve completely in between episodes. Would recommend neurology consult.    Thank you for your consult. I will follow-up with patient. Please contact us if you have any additional questions.    Radha Thurman MD  Neurosurgery  [facility]  [phone]

## 2022-08-08 NOTE — PLAN OF CARE
Problem: Adult Inpatient Plan of Care  Goal: Plan of Care Review  Outcome: Ongoing, Progressing   Supine with HOB up  45. POC and medications reviewed with pt  and verbalized complete understanding.  Tele [phone] in use.   HL in Left fa with DDI.  No redness or swelling at site.  SR up x 2  Call light in reach.  Bed in lowest position with brakes locked.   Problem: Adult Inpatient Plan of Care  Goal: Optimal Comfort and Wellbeing  Outcome: Ongoing, Progressing   Q 2 hourly rounds made through out day monitoring pain, position and IV site.   Problem: Diabetes Comorbidity  Goal: Blood Glucose Level Within Targeted Range  Outcome: Ongoing, Progressing   CBG monitored through out shift . Pt required no insulin  coverage

## 2022-08-08 NOTE — PLAN OF CARE
Hospital follow up scheduled with PCP with assistance from Denisha Ozuna . AVS updated.     Hospital follow up scheduled with neuro. Updated AVS

## 2022-08-08 NOTE — PLAN OF CARE
[facility] accepted pt. Obtained PHN auth through portal. Auth# [id number]       08/08/22 1539   Post-Acute Status   Post-Acute Authorization Home Health   Home Health Status Set-up Complete/Auth obtained

## 2022-08-08 NOTE — TELEPHONE ENCOUNTER
Placed call to patient, patient is in hospital at this time, advised to please have her call office to re-schedule when home and feeling well. Verbalizes understanding.

## 2022-08-08 NOTE — PT/OT/SLP EVAL
Occupational Therapy   Evaluation and Discharge Note    Name: Erica Masterson  MRN: [id number]  Admitting Diagnosis:  Hypocalcemia   Recent Surgery: * No surgery found *      Recommendations:     Discharge Recommendations: home  Discharge Equipment Recommendations:  none  Barriers to discharge:  None    Assessment:     Erica Masterson is a 76 y.o. female with a medical diagnosis of Hypocalcemia. At this time, patient is independent with ADLs. Patient does not require further acute OT services.     Plan:     During this hospitalization, patient does not require further acute OT services.  Please re-consult if situation changes.    · Plan of Care Reviewed with: patient    Subjective     Chief Complaint: none  Patient/Family Comments/goals: none    Occupational Profile:  Living Environment: Patient lives with spouse.   Previous level of function: Patient was independent with ADLs and mobility.   Equipment Used at home:  none  Assistance upon Discharge: Patient will receive assistance from spouse if needed.    Pain/Comfort:   · Pain Rating 1: 0/10  · Pain Rating Post-Intervention 1: 0/10    Patients cultural, spiritual, Congregational conflicts given the current situation:      Objective:     Communicated with: nurse Rucker prior to session.  Patient found sitting edge of bed with telemetry upon OT entry to room.    General Precautions: Standard, fall   Orthopedic Precautions:N/A   Braces: N/A  Respiratory Status: Room air     Occupational Performance:    Bed Mobility:    · Not assessed; patient seated EOB upon arrival. See PT notes.     Functional Mobility/Transfers:  · Patient completed Sit <> Stand Transfer with supervision  with  no assistive device   · Patient completed Toilet Transfer Stand Pivot technique with supervision with  no AD    Activities of Daily Living:  · Grooming: independence with all grooming tasks while standing at sink  · Upper Body Dressing: independence   · Lower Body Dressing: independence with  don/doffing socks while seated EOB  · Toileting: independence     Cognitive/Visual Perceptual:  Cognitive/Psychosocial Skills:     -       Oriented to: x4   -       Follows Commands/attention:Follows multistep  commands  -       Communication: clear/fluent  -       Safety awareness/insight to disability: intact   -       Mood/Affect/Coping skills/emotional control: Appropriate to situation and Cooperative  Visual/Perceptual:      -Intact     Physical Exam:  Postural examination/scapula alignment:    -       Rounded shoulders  -       Forward head  Upper Extremity Range of Motion:     -       Right Upper Extremity: WNL  -       Left Upper Extremity: WNL  Upper Extremity Strength:    -       Right Upper Extremity: WNL  -       Left Upper Extremity: WNL   Strength:    -       Right Upper Extremity: WNL  -       Left Upper Extremity: WNL  Fine Motor Coordination:    -       Intact  Gross motor coordination:   WFL    AMPAC 6 Click ADL:  AMPAC Total Score: 24     Education:    Patient left sitting edge of bed with call button in reach, nurse notified and spouse present    GOALS:   Multidisciplinary Problems     Occupational Therapy Goals     Not on file                History:     Past Medical History:   Diagnosis Date    Adenomatous polyp of colon 3/26/2012    Allergy     Anxiety     Cancer     Cataract     Colon polyp     Degenerative arthritis of knee 04/03/2012    Diverticulosis     Encounter for blood transfusion     GERD (gastroesophageal reflux disease)     History of thyroid cancer 2021    Hyperlipidemia     Hypertension     Lateral meniscal tear 5/20/2013    Multinodular goiter 03/26/2012    Myopathy, unspecified 01/18/2010    Tuberculosis        Past Surgical History:   Procedure Laterality Date    BREAST BIOPSY Left 2015    benign    CHOLECYSTECTOMY      COLONOSCOPY  12/2/15    Dr. Britton, multiple polyps, recheck five years-three years if more than 2 polyps are adenomatous     COLONOSCOPY N/A 12/2/2015    COLONOSCOPY N/A 3/20/2019    Procedure: COLONOSCOPY;  Surgeon: Jose Britton MD;  Location: [facility];  Service: Endoscopy;  Laterality: N/A;    COLONOSCOPY N/A 5/20/2020    Dr. Britton; internal hemorrhoids; diverticulosis; polyps removed; repeat in 3 years    CYSTOSCOPY N/A 8/26/2020    Procedure: CYSTOSCOPY;  Surgeon: Charlotte Walters Jr., MD;  Location: [facility] OR;  Service: Urology;  Laterality: N/A;    DISSECTION OF NECK Left 9/13/2021    Procedure: DISSECTION, NECK;  Surgeon: Ryan Torres MD;  Location: [facility] OR 2ND FLR;  Service: ENT;  Laterality: Left;    ESOPHAGOGASTRODUODENOSCOPY N/A 5/20/2020    Dr. Britton; small hiatal hernia; gastritis; 8 gastric polyps removed    ESOPHAGOGASTRODUODENOSCOPY N/A 4/1/2022    Procedure: EGD (ESOPHAGOGASTRODUODENOSCOPY);  Surgeon: Jose Britton MD;  Location: [facility];  Service: Endoscopy;  Laterality: N/A;    FOOT SURGERY      HYSTERECTOMY      TVH/BSO > 20 years    INTRALUMINAL GASTROINTESTINAL TRACT IMAGING VIA CAPSULE N/A 6/4/2020    Procedure: IMAGING PROCEDURE, GI TRACT, INTRALUMINAL, VIA CAPSULE;  Surgeon: Jose Britton MD;  Location: [facility];  Service: Endoscopy;  Laterality: N/A;    KNEE SURGERY  06/06/2013    right knee tear Dr Iverson     LAPAROSCOPIC CHOLECYSTECTOMY N/A 9/17/2020    Procedure: CHOLECYSTECTOMY, LAPAROSCOPIC;  Surgeon: Forrest Veronica MD;  [facility]: [facility];  Service: General;  Laterality: N/A;    LUNG LOBECTOMY  1966    right middle lobectomy, due to Tb    OOPHORECTOMY      SHOULDER SURGERY      francis     THYROIDECTOMY Bilateral 5/19/2021    Procedure: THYROIDECTOMY;  Surgeon: Jamia Amezquita MD;  Location: [facility] OR 2ND FLR;  Service: General;  Laterality: Bilateral;    THYROIDECTOMY Bilateral 9/13/2021    Procedure: THYROIDECTOMY WITH INTRA-OP PTH;  Surgeon: Ryan Torres MD;  Location: [facility] OR 2ND FLR;  Service: ENT;  Laterality: Bilateral;  NIM tube  Intraop PTH       Time  Tracking:     OT Date of Treatment: 08/08/22  OT Start Time: 1105  OT Stop Time: 1113  OT Total Time (min): 8 min    Billable Minutes:Evaluation 8    8/8/2022

## 2022-08-08 NOTE — PT/OT/SLP PROGRESS
Physical Therapy Treatment    Patient Name:  Erica Masterson   MRN:  [id number]    Recommendations:     Discharge Recommendations:  home   Discharge Equipment Recommendations:  (TBD (probably none))   Barriers to discharge: None    Assessment:     Erica Masterson is a 76 y.o. female admitted with a medical diagnosis of Hypocalcemia.  She presents with the following impairments/functional limitations:  weakness, gait instability, decreased lower extremity function . Agreed to mobilize. Reports no sensation issues with LE's at this time but it can occur with or without activity. Ambulated 150' with SBA, no assistive device required, Sat up in w/c to be taken for MRI.     Rehab Prognosis: Fair; patient would benefit from acute skilled PT services to address these deficits and reach maximum level of function.    Recent Surgery: * No surgery found *      Plan:     During this hospitalization, patient to be seen 6 x/week to address the identified rehab impairments via gait training, therapeutic activities, therapeutic exercises and progress toward the following goals:    · Plan of Care Expires:  08/08/22    Subjective     Chief Complaint: none at this time.  Patient/Family Comments/goals: none stated  Pain/Comfort:  · Pain Rating 1: 0/10      Objective:     Communicated with nurse Rucker prior to session.  Patient found supine with bed alarm upon PT entry to room.     General Precautions: Standard, fall   Orthopedic Precautions:N/A   Braces: N/A  Respiratory Status: Room air     Functional Mobility:  · Bed Mobility:     · Supine to Sit: stand by assistance  · Transfers:     · Sit to Stand:  stand by assistance with no AD  · Gait: 150' with SBA      AM-PAC 6 CLICK MOBILITY          Therapeutic Activities and Exercises:   Ambulated 150' with SBA, no assistive device required.     Patient left in w/c for transport with all lines intact, nurse Rucker notified and MRI tech present..    GOALS:   Multidisciplinary Problems     Physical  Therapy Goals        Problem: Physical Therapy    Goal Priority Disciplines Outcome Goal Variances Interventions   Physical Therapy Goal     PT, PT/OT Ongoing, Progressing     Description: Goals to be met by: 8/15/2022     Patient will increase functional independence with mobility by performin). Supine to sit with Modified [place]  2). Sit to supine with Modified [place]  3). Sit to stand transfer with Modified [place]  4). Gait  x > 200 feet with Modified [place]                      Time Tracking:     PT Received On: 22  PT Start Time: 1008     PT Stop Time: 1018  PT Total Time (min): 10 min     Billable Minutes: Gait Training 10min    Treatment Type: Treatment  PT/PTA: PTA     PTA Visit Number: 1     2022

## 2022-08-08 NOTE — PLAN OF CARE
Pt not discharging today due to pt still having symptoms as on admit. Neuro recommending- telepsych consult

## 2022-08-08 NOTE — PLAN OF CARE
POC/Meds reviewed, pt verbalized understanding. Vitals stable. Afebrile. Tele In place-[id number]. Blood glucose monitored. Prn medications given for sleep. Repositions self. Hourly/Q2hr rounding performed, safety maintained. Bed in lowest position, wheels locked, SR up x2, call light in easy reach. No  complaints at this time.

## 2022-08-08 NOTE — PLAN OF CARE
Problem: Physical Therapy  Goal: Physical Therapy Goal  Description: Goals to be met by: 8/15/2022     Patient will increase functional independence with mobility by performin). Supine to sit with Modified [place]  2). Sit to supine with Modified [place]  3). Sit to stand transfer with Modified [place]  4). Gait  x > 200 feet with Modified [place]     Outcome: Ongoing, Progressing   Ambulate with assistance for safety.

## 2022-08-08 NOTE — PLAN OF CARE
Home health orders sent to in network with [facility] HH companies       08/08/22 1126   Post-Acute Status   Post-Acute Authorization Home Health   Home Health Status Referrals Sent

## 2022-08-08 NOTE — CARE UPDATE
08/08/22 0815   Patient Assessment/Suction   Level of Consciousness (AVPU) alert   PRE-TX-O2   O2 Device (Oxygen Therapy) room air   SpO2 99 %   Pulse Oximetry Type Intermittent   $ Pulse Oximetry - Multiple Charge Pulse Oximetry - Multiple   Aerosol Therapy   $ Aerosol Therapy Charges PRN treatment not required

## 2022-08-08 NOTE — PROGRESS NOTES
MRI thoracic spine reviewed.    No spinal canal compromise. Cord appears normal.    No indication for neurosurgical intervention. Will sign off.

## 2022-08-09 ENCOUNTER — TELEPHONE (OUTPATIENT)
Dept: ENDOCRINOLOGY | Facility: CLINIC | Age: 77
End: 2022-08-09
Payer: MEDICARE

## 2022-08-09 VITALS
SYSTOLIC BLOOD PRESSURE: 168 MMHG | RESPIRATION RATE: 18 BRPM | WEIGHT: 148 LBS | TEMPERATURE: 98 F | OXYGEN SATURATION: 99 % | HEART RATE: 101 BPM | HEIGHT: 66 IN | DIASTOLIC BLOOD PRESSURE: 78 MMHG | BODY MASS INDEX: 23.78 KG/M2

## 2022-08-09 LAB
ALBUMIN SERPL BCP-MCNC: 3.2 G/DL (ref 3.5–5.2)
ALP SERPL-CCNC: 46 U/L (ref 55–135)
ALT SERPL W/O P-5'-P-CCNC: 11 U/L (ref 10–44)
ANION GAP SERPL CALC-SCNC: 11 MMOL/L (ref 8–16)
AST SERPL-CCNC: 15 U/L (ref 10–40)
BILIRUB SERPL-MCNC: 0.4 MG/DL (ref 0.1–1)
BUN SERPL-MCNC: 11 MG/DL (ref 8–23)
CALCIUM SERPL-MCNC: 8.9 MG/DL (ref 8.7–10.5)
CHLORIDE SERPL-SCNC: 102 MMOL/L (ref 95–110)
CO2 SERPL-SCNC: 26 MMOL/L (ref 23–29)
CREAT SERPL-MCNC: 0.9 MG/DL (ref 0.5–1.4)
EST. GFR  (NO RACE VARIABLE): >60 ML/MIN/1.73 M^2
GLUCOSE SERPL-MCNC: 102 MG/DL (ref 70–110)
MAGNESIUM SERPL-MCNC: 1.6 MG/DL (ref 1.6–2.6)
PHOSPHATE SERPL-MCNC: 4.6 MG/DL (ref 2.7–4.5)
POCT GLUCOSE: 127 MG/DL (ref 70–110)
POTASSIUM SERPL-SCNC: 3.8 MMOL/L (ref 3.5–5.1)
PROT SERPL-MCNC: 6.5 G/DL (ref 6–8.4)
SODIUM SERPL-SCNC: 139 MMOL/L (ref 136–145)

## 2022-08-09 PROCEDURE — 83735 ASSAY OF MAGNESIUM: CPT | Performed by: NURSE PRACTITIONER

## 2022-08-09 PROCEDURE — 25000003 PHARM REV CODE 250: Performed by: NURSE PRACTITIONER

## 2022-08-09 PROCEDURE — 84100 ASSAY OF PHOSPHORUS: CPT | Performed by: NURSE PRACTITIONER

## 2022-08-09 PROCEDURE — 36415 COLL VENOUS BLD VENIPUNCTURE: CPT | Performed by: NURSE PRACTITIONER

## 2022-08-09 PROCEDURE — 99900035 HC TECH TIME PER 15 MIN (STAT)

## 2022-08-09 PROCEDURE — 25000003 PHARM REV CODE 250: Performed by: HOSPITALIST

## 2022-08-09 PROCEDURE — 80053 COMPREHEN METABOLIC PANEL: CPT | Performed by: NURSE PRACTITIONER

## 2022-08-09 PROCEDURE — 94761 N-INVAS EAR/PLS OXIMETRY MLT: CPT

## 2022-08-09 PROCEDURE — 97116 GAIT TRAINING THERAPY: CPT | Mod: CQ

## 2022-08-09 PROCEDURE — G0378 HOSPITAL OBSERVATION PER HR: HCPCS

## 2022-08-09 RX ORDER — MIRTAZAPINE 7.5 MG/1
7.5 TABLET, FILM COATED ORAL NIGHTLY
Status: DISCONTINUED | OUTPATIENT
Start: 2022-08-09 | End: 2022-08-09 | Stop reason: HOSPADM

## 2022-08-09 RX ORDER — MIRTAZAPINE 7.5 MG/1
7.5 TABLET, FILM COATED ORAL NIGHTLY
Qty: 90 TABLET | Refills: 0 | Status: ON HOLD | OUTPATIENT
Start: 2022-08-09 | End: 2022-08-17 | Stop reason: HOSPADM

## 2022-08-09 RX ADMIN — LOSARTAN POTASSIUM 100 MG: 25 TABLET, FILM COATED ORAL at 08:08

## 2022-08-09 RX ADMIN — CALCIUM CARBONATE (ANTACID) CHEW TAB 500 MG 1000 MG: 500 CHEW TAB at 08:08

## 2022-08-09 RX ADMIN — AMLODIPINE BESYLATE 2.5 MG: 2.5 TABLET ORAL at 08:08

## 2022-08-09 RX ADMIN — CLOTRIMAZOLE 10 MG: 10 LOZENGE ORAL; TOPICAL at 08:08

## 2022-08-09 RX ADMIN — BACLOFEN 10 MG: 10 TABLET ORAL at 08:08

## 2022-08-09 RX ADMIN — CLOTRIMAZOLE 10 MG: 10 LOZENGE ORAL; TOPICAL at 10:08

## 2022-08-09 RX ADMIN — ASPIRIN 81 MG CHEWABLE TABLET 81 MG: 81 TABLET CHEWABLE at 08:08

## 2022-08-09 RX ADMIN — DOCUSATE SODIUM 100 MG: 100 CAPSULE, LIQUID FILLED ORAL at 08:08

## 2022-08-09 RX ADMIN — PANTOPRAZOLE SODIUM 40 MG: 40 TABLET, DELAYED RELEASE ORAL at 08:08

## 2022-08-09 RX ADMIN — POLYETHYLENE GLYCOL 3350 17 G: 17 POWDER, FOR SOLUTION ORAL at 08:08

## 2022-08-09 RX ADMIN — MELATONIN TAB 3 MG 6 MG: 3 TAB at 12:08

## 2022-08-09 RX ADMIN — CLOTRIMAZOLE 10 MG: 10 LOZENGE ORAL; TOPICAL at 05:08

## 2022-08-09 RX ADMIN — DULOXETINE 30 MG: 30 CAPSULE, DELAYED RELEASE ORAL at 08:08

## 2022-08-09 NOTE — SUBJECTIVE & OBJECTIVE
Interval History: still having symptoms as per initial presentation.  MRI T-spine pending    Review of Systems   Constitutional:  Positive for appetite change and fatigue. Negative for chills and fever.   HENT:  Negative for congestion.    Eyes:  Negative for photophobia and visual disturbance.   Respiratory:  Negative for cough, shortness of breath and wheezing.    Cardiovascular:  Negative for chest pain and palpitations.   Gastrointestinal:  Negative for abdominal pain, diarrhea, nausea and vomiting.   Genitourinary:  Negative for dysuria and hematuria.   Musculoskeletal:  Positive for gait problem. Negative for neck pain and neck stiffness.   Skin:  Negative for pallor and rash.   Neurological:  Positive for dizziness and numbness. Negative for syncope and weakness.   Psychiatric/Behavioral:  Negative for agitation and confusion.    All other systems reviewed and are negative.  Objective:     Vital Signs (Most Recent):  Temp: 97.7 °F (36.5 °C) (08/09/22 0733)  Pulse: 101 (08/09/22 0733)  Resp: 18 (08/09/22 0733)  BP: (!) 168/78 (08/09/22 0733)  SpO2: 99 % (08/09/22 0733)   Vital Signs (24h Range):  Temp:  [96.1 °F (35.6 °C)-98.6 °F (37 °C)] 97.7 °F (36.5 °C)  Pulse:  [] 101  Resp:  [16-18] 18  SpO2:  [97 %-99 %] 99 %  BP: (131-191)/(60-86) 168/78     Weight: 67.1 kg (148 lb)  Body mass index is 23.89 kg/m².    Intake/Output Summary (Last 24 hours) at 8/9/2022 0822  Last data filed at 8/9/2022 0500  Gross per 24 hour   Intake 480 ml   Output --   Net 480 ml      Physical Exam  Constitutional:       General: She is not in acute distress.     Appearance: She is well-developed.   HENT:      Head: Normocephalic and atraumatic.   Eyes:      Conjunctiva/sclera: Conjunctivae normal.      Pupils: Pupils are equal, round, and reactive to light.   Cardiovascular:      Rate and Rhythm: Normal rate and regular rhythm.      Heart sounds: Normal heart sounds.   Pulmonary:      Effort: Pulmonary effort is normal. No  respiratory distress.      Breath sounds: Normal breath sounds.   Abdominal:      General: Bowel sounds are normal. There is no distension.      Palpations: Abdomen is soft.   Musculoskeletal:         General: Tenderness (left shoulder) present. Normal range of motion.      Cervical back: Normal range of motion and neck supple.   Skin:     General: Skin is warm and dry.   Neurological:      General: No focal deficit present.      Mental Status: She is alert and oriented to person, place, and time.   Psychiatric:         Mood and Affect: Mood is anxious.         Behavior: Behavior normal.         Thought Content: Thought content normal.       Significant Labs: All pertinent labs within the past 24 hours have been reviewed.  CBC: No results for input(s): WBC, HGB, HCT, PLT in the last 48 hours.  CMP:   Recent Labs   Lab 08/08/22  0421 08/09/22  0412    139   K 3.5 3.8    102   CO2 24 26    102   BUN 9 11   CREATININE 0.9 0.9   CALCIUM 8.9 8.9   PROT 6.7 6.5   ALBUMIN 3.2* 3.2*   BILITOT 0.6 0.4   ALKPHOS 49* 46*   AST 14 15   ALT 15 11   ANIONGAP 12 11       Significant Imaging: I have reviewed all pertinent imaging results/findings within the past 24 hours.

## 2022-08-09 NOTE — PLAN OF CARE
Pt clear for DC from case management standpoint. Discharging to home with [place]        08/09/22 1044   Final Note   Assessment Type Final Discharge Note   Anticipated Discharge Disposition Home-Health   Hospital Resources/Appts/Education Provided Appointments scheduled and added to AVS

## 2022-08-09 NOTE — NURSING
Cleared by CM for discharge. Patient supine with HOB up 45.  at bedside. Tele [id number] removed and placed at ns station. HL in Left fa removed with cath intact and gauze applied and secured with coban. Discharge instructions given and reviewed with patient. Verbalized understanding.Patient discharged home with  per personal car.

## 2022-08-09 NOTE — PROGRESS NOTES
[facility] Hospital Medicine  Progress Note    Patient Name: Erica Masterson  MRN: [id number]  Patient Class: OP- Observation   Admission Date: 8/6/2022  Length of Stay: 0 days  Attending Physician: Mila Matt MD  Primary Care Provider: Narayan Myers MD        Subjective:     Principal Problem:Hypocalcemia        HPI:  Erica Masterson is a 77 y/o F with a past medical history significant for HTN, HLD, anxiety, and thyroid cancer s/p thyroidectomy who presented to the ED after finding herself unable to ambulate early this morning.  Reports that she has had intermittent episodes of N/T to her extremities and episodes of difficutly ambulating that usually resolve spontaneously before she can get to the ED to be assessed.  When such episodes have occurred, she reports that she has been diagnosed with anxiety as no other reason has been determined. She has had pain and decreased ROM to the left shoulder which pt states is being treated by orthopedics.  Symptoms seemed to be worse today than usual.  Denies recent fever, chest pain, SOB, dysuria, difficulty with speech or swallowing or any other symptoms. Reports decreasing her synthroid dose a few days ago at the direction of her endocrinologist, but no other medication changes.  ED workup is significant for hypocalcemia.  She is placed in observation under the service of hospital medicine for continued monitoring and treatment.          Overview/Hospital Course:  No notes on file    Interval History: still having symptoms as per initial presentation.  MRI T-spine pending    Review of Systems   Constitutional:  Positive for appetite change and fatigue. Negative for chills and fever.   HENT:  Negative for congestion.    Eyes:  Negative for photophobia and visual disturbance.   Respiratory:  Negative for cough, shortness of breath and wheezing.    Cardiovascular:  Negative for chest pain and palpitations.   Gastrointestinal:  Negative for abdominal  pain, diarrhea, nausea and vomiting.   Genitourinary:  Negative for dysuria and hematuria.   Musculoskeletal:  Positive for gait problem. Negative for neck pain and neck stiffness.   Skin:  Negative for pallor and rash.   Neurological:  Positive for dizziness and numbness. Negative for syncope and weakness.   Psychiatric/Behavioral:  Negative for agitation and confusion.    All other systems reviewed and are negative.  Objective:     Vital Signs (Most Recent):  Temp: 97.7 °F (36.5 °C) (08/09/22 0733)  Pulse: 101 (08/09/22 0733)  Resp: 18 (08/09/22 0733)  BP: (!) 168/78 (08/09/22 0733)  SpO2: 99 % (08/09/22 0733)   Vital Signs (24h Range):  Temp:  [96.1 °F (35.6 °C)-98.6 °F (37 °C)] 97.7 °F (36.5 °C)  Pulse:  [] 101  Resp:  [16-18] 18  SpO2:  [97 %-99 %] 99 %  BP: (131-191)/(60-86) 168/78     Weight: 67.1 kg (148 lb)  Body mass index is 23.89 kg/m².    Intake/Output Summary (Last 24 hours) at 8/9/2022 0822  Last data filed at 8/9/2022 0500  Gross per 24 hour   Intake 480 ml   Output --   Net 480 ml      Physical Exam  Constitutional:       General: She is not in acute distress.     Appearance: She is well-developed.   HENT:      Head: Normocephalic and atraumatic.   Eyes:      Conjunctiva/sclera: Conjunctivae normal.      Pupils: Pupils are equal, round, and reactive to light.   Cardiovascular:      Rate and Rhythm: Normal rate and regular rhythm.      Heart sounds: Normal heart sounds.   Pulmonary:      Effort: Pulmonary effort is normal. No respiratory distress.      Breath sounds: Normal breath sounds.   Abdominal:      General: Bowel sounds are normal. There is no distension.      Palpations: Abdomen is soft.   Musculoskeletal:         General: Tenderness (left shoulder) present. Normal range of motion.      Cervical back: Normal range of motion and neck supple.   Skin:     General: Skin is warm and dry.   Neurological:      General: No focal deficit present.      Mental Status: She is alert and oriented to  person, place, and time.   Psychiatric:         Mood and Affect: Mood is anxious.         Behavior: Behavior normal.         Thought Content: Thought content normal.       Significant Labs: All pertinent labs within the past 24 hours have been reviewed.  CBC: No results for input(s): WBC, HGB, HCT, PLT in the last 48 hours.  CMP:   Recent Labs   Lab 08/08/22  0421 08/09/22  0412    139   K 3.5 3.8    102   CO2 24 26    102   BUN 9 11   CREATININE 0.9 0.9   CALCIUM 8.9 8.9   PROT 6.7 6.5   ALBUMIN 3.2* 3.2*   BILITOT 0.6 0.4   ALKPHOS 49* 46*   AST 14 15   ALT 15 11   ANIONGAP 12 11       Significant Imaging: I have reviewed all pertinent imaging results/findings within the past 24 hours.      Assessment/Plan:      * Hypocalcemia  Repleted; monitor and replete as needed.      Weakness of both lower extremities  C/o intermittent episodes of LE weakness accompanied by N/T  MRI L-spine-reviewed; neurosurgery consulted  Replete calcium  Neurology consult  PT/OT consult  Fall precautions      Postoperative hypothyroidism  Patient has chronic hypothyroidism. TFTs reviewed-   Lab Results   Component Value Date    TSH <0.010 (L) 08/06/2022   . Will continue chronic levothyroxine and adjust for and clinical changes.  Synthroid recently decreased.  Continue current dosage.      RONNIE (generalized anxiety disorder), 2019  Chronic; continue chronic SNRI    Type 2 diabetes mellitus, without long-term current use of insulin, dx 3/2020  Patient's FSGs are controlled on current medication regimen.  Last A1c reviewed-   Lab Results   Component Value Date    HGBA1C 5.8 (H) 04/06/2022     Most recent fingerstick glucose reviewed-   Recent Labs   Lab 08/06/22  1947   POCTGLUCOSE 99     Current correctional scale  Medium  Maintain anti-hyperglycemic dose as follows-   Antihyperglycemics (From admission, onward)            Start     Stop Route Frequency Ordered    08/06/22 1021  insulin aspart U-100 pen 1-10 Units          -- SubQ Before meals & nightly PRN 08/06/22 1003        Hold Oral hypoglycemics while patient is in the hospital.    GERD (gastroesophageal reflux disease)  Chronic; continue chronic PPI      PAD (peripheral artery disease)  Chronic issue; continue chronic statin.    Hypertension associated with diabetes  Chronic, controlled.  Latest blood pressure and vitals reviewed-   Temp:  [97.1 °F (36.2 °C)-98.1 °F (36.7 °C)]   Pulse:  [64-97]   Resp:  [17-20]   BP: (137-167)/(63-75)   SpO2:  [97 %-98 %] .   Home meds for hypertension were reviewed and noted below.   Hypertension Medications             amLODIPine (NORVASC) 2.5 MG tablet Take 1 tablet by mouth once daily    losartan (COZAAR) 100 MG tablet Take 1 tablet by mouth once daily          While in the hospital, will manage blood pressure as follows; Continue home antihypertensive regimen    Will utilize p.r.n. blood pressure medication only if patient's blood pressure greater than  180/110 and she develops symptoms such as worsening chest pain or shortness of breath.      Hyperlipidemia associated with type 2 diabetes mellitus, baseline    Patient is chronically on statin.will continue for now. Monitor clinically. Last LDL was   Lab Results   Component Value Date    LDLCALC 97.4 04/06/2022            VTE Risk Mitigation (From admission, onward)         Ordered     enoxaparin injection 40 mg  Daily         08/06/22 1003     IP VTE HIGH RISK PATIENT  Once         08/06/22 1003     Place sequential compression device  Until discontinued         08/06/22 1003                Discharge Planning   KIKA: 8/9/2022     Code Status: Full Code   Is the patient medically ready for discharge?:     Reason for patient still in hospital (select all that apply): Patient trending condition, Consult recommendations and Pending disposition  Discharge Plan A: Home                  KAREN Madsen  Department of Hospital Medicine   [facility]

## 2022-08-09 NOTE — CONSULTS
[facility]  Psychiatry  Telepsychiatry Consult Note            Patient agreeable to consultation via telepsychiatry.    Tele-Consultation from Psychiatry started: 8/8/2022 at 8:59 PM  The chief complaint leading to psychiatric consultation is: possible conversion disorder   This consultation was requested by KAREN Baeza, the primary team provider.  The location of the consulting psychiatrist is [place], [place].    The patient location is  [facility] MEDICAL SURGICAL UNIT*    Also present with the patient at the time of the consultation: nurse/tech     Patient Identification:   Erica Masterson is a 76 y.o. female.    Patient information was obtained from patient, past medical records, and primary team.    Inpatient consult to Telemedicine - Psych  Consult performed by: Jose Antonio Jeffries MD  Consult ordered by: KAREN Baeza        Consult Start Time: 08/08/2022 20:59 CDT  Consult End Time: 08/08/2022 22:17 CDT      HISTORY    Per Initial History from Primary Team:   Erica Masterson is a 75 y/o F with a past medical history significant for HTN, HLD, anxiety, and thyroid cancer s/p thyroidectomy who presented to the ED after finding herself unable to ambulate early this morning.  Reports that she has had intermittent episodes of N/T to her extremities and episodes of difficutly ambulating that usually resolve spontaneously before she can get to the ED to be assessed.  When such episodes have occurred, she reports that she has been diagnosed with anxiety as no other reason has been determined. She has had pain and decreased ROM to the left shoulder which pt states is being treated by orthopedics.  Symptoms seemed to be worse today than usual.  Denies recent fever, chest pain, SOB, dysuria, difficulty with speech or swallowing or any other symptoms. Reports decreasing her synthroid dose a few days ago at the direction of her endocrinologist, but no other medication changes.  ED workup is  "significant for hypocalcemia.  She is placed in observation under the service of hospital medicine for continued monitoring and treatment.  Interval History from Primary Team on 08/07/22:  Pt found sitting up at bedside with lunch tray.  States she is have a "rough day" and has no appetite.  Calcium better, but still c/o LE weakness and tightness to left shoulder.  Discussed chronic nature of left shoulder tightness.  She will continue OP follow up with her Orthopedics and physical medicine and rehab;  MRI L-spine reviewed.  Will consult neurosurgery for further recommendations.    Interval History from Neurology Team on 08/08/22:  No acute events overnight. Patient was seen and examined by me this morning. Neuro exam is normal.  Patient states that she gets weak and numb all her extremities and this happens mostly when she sleeps and wakes up.  Currently she denies any symptoms.  Workup  · CTH: No acute intracranial abnormality   · MRI brain:  No acute intracranial abnormality.  · MRA head:  No large vessel occlusion or high-grade stenosis.  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  · CUS:  No evidence of hemodynamically CV neck pain carotid bifurcation stenosis.  Vertebral artery flow appears antegrade bilaterally.  Plan  · Aspirin 81 mg and Lipitor 40mg for stroke prevention  · Permissive BP to 220 systolic for 24hrs from symptom onset and after that normalize BP  · PT OT  · Speech therapy  · DVT prophylaxis with chemo/SCD prophylaxis  · Discussed lifestyle modifications as prophylactic measures for stroke prevention including, adequate blood pressure management, healthy diet and regular exercise.  · No further neurological workup is needed at this time.  Will sign off, please call with any questions      Chief Complaint / Reason for Psychiatry Consult: Possible Conversion Disorder       HPI:   Erica Masterson is a 76 y.o. female with a past medical history as noted " above/below, and a past psychiatric history of anxiety and insomnia, currently being treated by her inpatient primary team for a principle problem of hypocalcemia.  Psychiatry was originally consulted as noted above.  The patient was seen and examined.  The chart was reviewed.  On examination today, the patient was alert and oriented to person, place, city, state, month, year, and situation.  She was CAM-ICU negative for delirium.  She endorses a multi-year hx of anxiety and sleep difficulty, but she states that she has never formally seen a psychiatrist for treatment.  She states that she currently takes Cymbalta 30 mg PO daily with some benefit, but she is requesting something that can be added to help with both sleep and anxiety.  She endorses only sleeping about 4-5 hours per night with intermittent wakening.  She endorses a fair appetite.  She denies any current or prior s/s consistent with depression, panic, psychosis, price, OCD, PTSD, eating disorders, or substance abuse.  She denies any AH, VH, TH, delusions, paranoia, or DIGFAST (price) s/s.  She denies any current/prior passive/active SI/HI.  She denies any notable recent stressors or trauma.  She denies any history of sexual, physical, or emotional abuse.  She denies any adverse effects to her current medication regimen.  Regarding current / recent medical/physical complaints, she endorses fatigue and BLE weakness for the first 30 minutes after waking up.  She denies any other medical complaints at this time.  NAD was observed during the examination.  See detailed psych ROS below.  Psychotherapy was implemented as noted below with a focus on improving anxiety and sleep.  See A/P below.        Psychiatric Review Of Systems - Currently, the patient is endorsing and/or denying the following:  (patient's endorsements are BOLDED below; if not BOLDED, then patient denied):    Denies Symptoms of Depression: diminished mood, low motivation, loss of  interest/anhedonia, irritability, diminished energy, change in sleep, change in appetite, diminished concentration or cognition or indecisiveness, PMA/R, excessive guilt or hopelessness or worthlessness, suicidal ideations    Endorses intermittent trouble with Sleep: initiation, maintenance, early morning awakening with inability to return to sleep    Denies Suicidal/Homicidal ideations: active/passive ideations, organized plans, future intentions    Denies Symptoms of psychosis: hallucinations, delusions, disorganized thinking, disorganized behavior or abnormal motor behavior, or negative symptoms (diminshed emotional expression, avolition, anhedonia, alogia, asociality     Denies Symptoms of price or hypomania: elevated, expansive, or irritable mood with increased energy or activity; with inflated self-esteem or grandiosity, decreased need for sleep, increased rate of speech, FOI or racing thoughts, distractibility, increased goal directed activity or PMA, risky/disinhibited behavior    Endorses Symptoms of Anxiety / RONNIE: excessive anxiety/worry/fear, more days than not, about numerous issues, difficult to control, with restlessness, fatigue, poor concentration, irritability, muscle tension, sleep disturbance; causes functionally impairing distress     Denies Symptoms of Panic Disorder: recurrent panic attacks, precipitated or un-precipitated, source of worry and/or behavioral changes secondary; with or without agoraphobia    Denies Symptoms of PTSD: h/o trauma; re-experiencing/intrusive symptoms, avoidant behavior, negative alterations in cognition or mood, or hyperarousal symptoms; with or without dissociative symptoms     Denies Symptoms of OCD: obsessions or compulsions     Denies Symptoms of Eating Disorders: anorexia, bulimia or binging    Denies Substance Use: intoxication, withdrawal, tolerance, used in larger amounts or duration than intended, unsuccessful attempts to limit or quit, increased time  engaging in or seeking out, cravings or strong desire to use, failure to fulfill obligations, negative consequences in social/interpersonal/occupational,/recreational areas, use in dangerous situations, medical or psychological consequences       [facility] Toolkit ASQ Suicide Screening Tool:  1. In the past few weeks, have you wished you were dead? Denies   2. In the past few weeks, have you felt that you or your family would be better off if you were dead? Denies   3. In the past week, have you been having thoughts about killing yourself? Denies   4. Have you ever tried to kill yourself? Denies   5. Are you having thoughts of killing yourself right now? Denies       PSYCHOTHERAPY ADD-ON +08700   30 (16-37*) minutes    Time: 17 minutes  Participants: Met with patient    Therapeutic Intervention Type: behavior modifying psychotherapy, supportive psychotherapy  Why chosen therapy is appropriate versus another modality: relevant to diagnosis, patient responds to this modality, evidence based practice    Target symptoms: anxiety and insomnia   Primary focus: improving anxiety and sleep   Psychotherapeutic techniques: supportive and psychodynamic techniques; psycho-education; deep breathing exercises; CBT; problem solving techniques and managing life/medical stressors    Outcome monitoring methods: self-report, observation    Patient's response to intervention:  The patient's response to intervention is accepting.    Progress toward goals:  The patient's progress toward goals is fair .      ROS:  General ROS: negative for - chills, fever or night sweats; positive for fatigue  Ophthalmic ROS: negative for - blurry vision, double vision or eye pain  ENT ROS: negative for - sinus pain, headaches, sore throat or visual changes  Allergy and Immunology ROS: negative for - hives, itchy/watery eyes or nasal congestion  Hematological and Lymphatic ROS: negative for - bleeding problems, bruising, jaundice or pallor  Endocrine ROS:  negative for - galactorrhea, hot flashes, mood swings, palpitations or temperature intolerance  Respiratory ROS: negative for - cough, hemoptysis, shortness of breath, tachypnea or wheezing  Cardiovascular ROS: negative for - chest pain, dyspnea on exertion, loss of consciousness, palpitations, rapid heart rate or shortness of breath  Gastrointestinal ROS: negative for - appetite loss, nausea, abdominal pain, blood in stools, change in bowel habits, constipation or diarrhea  Genito-Urinary ROS: negative for - incontinence, nocturia or pelvic pain  Musculoskeletal ROS: negative for - joint stiffness, joint swelling, joint pain or muscle pain   Neurological ROS: negative for - behavioral changes, confusion, dizziness, memory loss, numbness/tingling or seizures; positive for BLE weakness for first 30 minutes after waking up   Dermatological ROS: negative for dry skin, hair changes, pruritus or rash  Psychiatric ROS: see detailed psychiatric ROS above in history section       Past Psychiatric History:  Previous Medication Trials: Doxepin, Cymbalta, and Gabapentin   Previous Psychiatric Hospitalizations: denies   Previous Suicide Attempts: denies   History of Violence: denies   Outpatient Psychiatrist: denies   Hx of Depression: denies   Hx of Anxiety: yes  Hx of Irma: denies   Hx of Psychosis: denies  Hx of PTSD: denies     Social History:  Marital Status:  for 56 years   Children: 2 adult children   Employment Status/Info: retired   Education: high school diploma/GED  Special Ed: denies   : denies   Adventism: Sikh   Housing Status: lives in [place], [place] with    Hobbies/Leisure time: exercising, playing games, and watching TV game shows   History of phys/sexual abuse: denies   Access to gun: denies     Family Psychiatric History: sister with dementia and son with alcoholism     Substance Abuse History:  Recreational Drugs: denies   Use of Alcohol: denies   Rehab History: denies   Tobacco Use:  denies   Use of Caffeine: denies   Use of OTC: denies   Legal consequences of chemical use: denies     Legal History:  Past Charges/Incarcerations: denies   Pending charges: denies     Psychosocial Stressors: health  Functioning Relationships: good support system in sister and    Strengths AND Liabilities:  Strength: Patient accepts guidance/feedback, Strength: Patient is expressive/articulate., Strength: Patient has positive support network., Strength: Patient has reasonable judgment., Liability: Patient has poor health.      PAST MEDICAL & SURGICAL HISTORY   Past Medical History:   Diagnosis Date    Adenomatous polyp of colon 3/26/2012    Allergy     Anxiety     Cancer     Cataract     Colon polyp     Degenerative arthritis of knee 04/03/2012    Diverticulosis     Encounter for blood transfusion     GERD (gastroesophageal reflux disease)     History of thyroid cancer 2021    Hyperlipidemia     Hypertension     Lateral meniscal tear 5/20/2013    Multinodular goiter 03/26/2012    Myopathy, unspecified 01/18/2010    Tuberculosis      Past Surgical History:   Procedure Laterality Date    BREAST BIOPSY Left 2015    benign    CHOLECYSTECTOMY      COLONOSCOPY  12/2/15    Dr. Britton, multiple polyps, recheck five years-three years if more than 2 polyps are adenomatous    COLONOSCOPY N/A 12/2/2015    COLONOSCOPY N/A 3/20/2019    Procedure: COLONOSCOPY;  Surgeon: Jose Britton MD;  Location: [facility];  Service: Endoscopy;  Laterality: N/A;    COLONOSCOPY N/A 5/20/2020    Dr. Britton; internal hemorrhoids; diverticulosis; polyps removed; repeat in 3 years    CYSTOSCOPY N/A 8/26/2020    Procedure: CYSTOSCOPY;  Surgeon: Charlotte Walters Jr., MD;  Location: [facility];  Service: Urology;  Laterality: N/A;    DISSECTION OF NECK Left 9/13/2021    Procedure: DISSECTION, NECK;  Surgeon: Ryan Torres MD;  Location: [address];  Service: ENT;  Laterality: Left;    ESOPHAGOGASTRODUODENOSCOPY  N/A 5/20/2020    Dr. Britton; small hiatal hernia; gastritis; 8 gastric polyps removed    ESOPHAGOGASTRODUODENOSCOPY N/A 4/1/2022    Procedure: EGD (ESOPHAGOGASTRODUODENOSCOPY);  Surgeon: Jose Britton MD;  Location: [facility] ENDO;  Service: Endoscopy;  Laterality: N/A;    FOOT SURGERY      HYSTERECTOMY      TVH/BSO > 20 years    INTRALUMINAL GASTROINTESTINAL TRACT IMAGING VIA CAPSULE N/A 6/4/2020    Procedure: IMAGING PROCEDURE, GI TRACT, INTRALUMINAL, VIA CAPSULE;  Surgeon: Jose Britton MD;  Location: [facility] ENDO;  Service: Endoscopy;  Laterality: N/A;    KNEE SURGERY  06/06/2013    right knee tear Dr Iverson     LAPAROSCOPIC CHOLECYSTECTOMY N/A 9/17/2020    Procedure: CHOLECYSTECTOMY, LAPAROSCOPIC;  Surgeon: Forrest Veronica MD;  Location: [facility];  Service: General;  Laterality: N/A;    LUNG LOBECTOMY  1966    right middle lobectomy, due to Tb    OOPHORECTOMY      SHOULDER SURGERY      francis     THYROIDECTOMY Bilateral 5/19/2021    Procedure: THYROIDECTOMY;  Surgeon: Jamia Amezquita MD;  Location: [facility] OR [address] [place] [address];  Service: General;  Laterality: Bilateral;    THYROIDECTOMY Bilateral 9/13/2021    Procedure: THYROIDECTOMY WITH INTRA-OP PTH;  Surgeon: Ryan Torres MD;  Location: [facility] OR [address] [place] [address];  Service: ENT;  Laterality: Bilateral;  NIM tube  Intraop PTH       NEUROLOGIC HISTORY  Seizures: denies   Head trauma: denies  CVA: denies        FAMILY HISTORY   Family History   Problem Relation Age of Onset    Colon cancer Other     Cancer Mother     Hypertension Mother     Hyperlipidemia Mother     Cancer Brother     Hypertension Father     Emphysema Father     Diabetes Son     Hypertension Son     Alcohol abuse Son     Diabetes Maternal Aunt     Alzheimer's disease Maternal Uncle     Cancer Maternal Grandmother     No Known Problems Daughter     Dementia Sister     Alzheimer's disease Sister     Breast cancer Sister 60    Obesity Paternal Uncle     Prostate cancer Other      Cancer Brother     Melanoma Neg Hx     Psoriasis Neg Hx     Lupus Neg Hx     Eczema Neg Hx     Amblyopia Neg Hx     Blindness Neg Hx     Cataracts Neg Hx     Glaucoma Neg Hx     Macular degeneration Neg Hx     Retinal detachment Neg Hx     Strabismus Neg Hx     Stroke Neg Hx     Thyroid cancer Neg Hx     Colon polyps Neg Hx     Ulcerative colitis Neg Hx     Stomach cancer Neg Hx     Rectal cancer Neg Hx        ALLERGIES   Review of patient's allergies indicates:  No Known Allergies    CURRENT MEDICATION REGIMEN   Home Meds:   Prior to Admission medications    Medication Sig Start Date End Date Taking? Authorizing Provider   amLODIPine (NORVASC) 2.5 MG tablet Take 1 tablet by mouth once daily 11/22/21   Dustin Robles MD   baclofen (LIORESAL) 10 MG tablet Take 1 tablet (10 mg total) by mouth 3 (three) times daily. Start w. Half a tab first 7 days 7/25/22 8/24/22  Eunice Alvarez,    blood sugar diagnostic (BLOOD GLUCOSE TEST) Strp True Metrix glucose strips check once daily 2/2/21   Narayan Myers MD   blood-glucose meter kit True Metrix glucometer check glucose once daily 5/5/20 4/26/22  Narayan Myers MD   calcium carbonate (TUMS) 200 mg calcium (500 mg) chewable tablet Chew and swallow 2 tablets (1,000 mg total) by mouth 3 (three) times daily. for 14 days 9/14/21 6/6/22  Nilay Chou MD   carboxymethylcellulose sodium (REFRESH OPHT) Apply 1 drop to eye daily as needed.    Historical Provider   clotrimazole (MYCELEX) 10 mg sol Take 1 tablet (10 mg total) by mouth 5 (five) times daily. for 10 days 8/3/22 8/13/22  RUSLAN Palencia   conjugated estrogens (PREMARIN) vaginal cream Place 0.5 g vaginally once daily AND 0.5 g twice a week.  Patient taking differently: Place 0.5 g vaginally once daily AND 0.5 g twice a week. Saturdays and tuesdays 2/23/22 2/8/23  Mat Beach MD   DULoxetine (CYMBALTA) 30 MG capsule Take 1 capsule (30 mg total) by mouth once daily. 8/3/22  8/3/23  RUSLAN Palencia   ergocalciferol (ERGOCALCIFEROL) 50,000 unit Cap Take 1 capsule (50,000 Units total) by mouth every 30 days. 1/6/22   Juan M Landrum MD   erythromycin (ROMYCIN) ophthalmic ointment Place into the left eye every 4 (four) hours. Place a 1/2 inch ribbon of ointment into the lower eyelid. for 5 days 8/4/22 8/9/22  Julián Oliver MD   gabapentin (NEURONTIN) 300 MG capsule Take 1 capsule (300 mg total) by mouth 2 (two) times daily. 4/26/22 4/26/23  Dustin Robles MD   lansoprazole (PREVACID) 30 MG capsule Take 30 mg by mouth once daily. 5/25/22   Historical Provider   levothyroxine (SYNTHROID) 88 MCG tablet Take 1 tablet (88 mcg total) by mouth before breakfast. 8/2/22 8/2/23  Juan M Landrum MD   losartan (COZAAR) 100 MG tablet Take 1 tablet by mouth once daily 5/25/22   Narayan Myers MD   nystatin (MYCOSTATIN) 100,000 unit/mL suspension Take 4 mLs (400,000 Units total) by mouth 4 (four) times daily. for 10 days 8/4/22 8/14/22  Julián Oliver MD   pantoprazole (PROTONIX) 40 MG tablet Take 1 tablet (40 mg total) by mouth once daily. 4/25/22 7/24/22  Deja Mendoza, VIKA   polyethylene glycol (GLYCOLAX) 17 gram PwPk Take 17 g by mouth once daily. 3/23/22 3/23/23  Dustin Mcmahon MD   rosuvastatin (CRESTOR) 20 MG tablet Take 0.5 tablets (10 mg total) by mouth every evening. 4/26/22   Dustin Robles MD   simethicone (MYLICON) 125 MG chewable tablet Take 125 mg by mouth every 6 (six) hours as needed for Flatulence.    Historical Provider   UNABLE TO FIND I B guard bid    Historical Provider       OTC Meds: denies     Scheduled Meds:    amLODIPine  2.5 mg Oral Daily    aspirin  81 mg Oral Daily    atorvastatin  40 mg Oral QHS    baclofen  10 mg Oral TID    calcium carbonate  2 tablet Oral TID    calcium gluconate IVPB  1 g Intravenous Once    clotrimazole  10 mg Oral 5x Daily    docusate sodium  100 mg Oral Daily    DULoxetine  30 mg Oral Daily    enoxaparin  40 mg  Subcutaneous Daily    erythromycin   Both Eyes QHS    levothyroxine  88 mcg Oral Before breakfast    losartan  100 mg Oral Daily    pantoprazole  40 mg Oral Daily    polyethylene glycol  17 g Oral Daily      PRN Meds: acetaminophen, albuterol-ipratropium, aluminum-magnesium hydroxide-simethicone, artificial tears, dextrose 50%, dextrose 50%, glucagon (human recombinant), glucose, glucose, HYDROcodone-acetaminophen, insulin aspart U-100, magnesium oxide, magnesium oxide, melatonin, naloxone, ondansetron, potassium bicarbonate, potassium bicarbonate, potassium bicarbonate, potassium, sodium phosphates, potassium, sodium phosphates, potassium, sodium phosphates, simethicone, sodium chloride 0.9%   Psychotherapeutics (From admission, onward)            Start     Stop Route Frequency Ordered    08/06/22 1030  DULoxetine DR capsule 30 mg         -- Oral Daily 08/06/22 1003          LABORATORY DATA   Recent Results (from the past 72 hour(s))   CBC Auto Differential    Collection Time: 08/06/22  5:03 AM   Result Value Ref Range    WBC 5.67 3.90 - 12.70 K/uL    RBC 3.97 (L) 4.00 - 5.40 M/uL    Hemoglobin 11.5 (L) 12.0 - 16.0 g/dL    Hematocrit 34.4 (L) 37.0 - 48.5 %    MCV 87 82 - 98 fL    MCH 29.0 27.0 - 31.0 pg    MCHC 33.4 32.0 - 36.0 g/dL    RDW 12.8 11.5 - 14.5 %    Platelets 257 150 - 450 K/uL    MPV 8.9 (L) 9.2 - 12.9 fL    Immature Granulocytes 0.2 0.0 - 0.5 %    Gran # (ANC) 3.0 1.8 - 7.7 K/uL    Immature Grans (Abs) 0.01 0.00 - 0.04 K/uL    Lymph # 2.1 1.0 - 4.8 K/uL    Mono # 0.5 0.3 - 1.0 K/uL    Eos # 0.1 0.0 - 0.5 K/uL    Baso # 0.02 0.00 - 0.20 K/uL    nRBC 0 0 /100 WBC    Gran % 52.1 38.0 - 73.0 %    Lymph % 36.3 18.0 - 48.0 %    Mono % 9.2 4.0 - 15.0 %    Eosinophil % 1.8 0.0 - 8.0 %    Basophil % 0.4 0.0 - 1.9 %    Differential Method Automated    Comprehensive Metabolic Panel    Collection Time: 08/06/22  5:03 AM   Result Value Ref Range    Sodium 141 136 - 145 mmol/L    Potassium 3.7 3.5 - 5.1 mmol/L     Chloride 104 95 - 110 mmol/L    CO2 25 23 - 29 mmol/L    Glucose 112 (H) 70 - 110 mg/dL    BUN 14 8 - 23 mg/dL    Creatinine 1.1 0.5 - 1.4 mg/dL    Calcium 7.9 (L) 8.7 - 10.5 mg/dL    Total Protein 6.8 6.0 - 8.4 g/dL    Albumin 3.3 (L) 3.5 - 5.2 g/dL    Total Bilirubin 0.6 0.1 - 1.0 mg/dL    Alkaline Phosphatase 53 (L) 55 - 135 U/L    AST 14 10 - 40 U/L    ALT 15 10 - 44 U/L    Anion Gap 12 8 - 16 mmol/L    eGFR 51 (A) >60 mL/min/1.73 m^2   CK    Collection Time: 08/06/22  5:03 AM   Result Value Ref Range     (H) 20 - 180 U/L   C-Reactive Protein    Collection Time: 08/06/22  5:03 AM   Result Value Ref Range    CRP 2.5 0.0 - 8.2 mg/L   Magnesium    Collection Time: 08/06/22  5:03 AM   Result Value Ref Range    Magnesium 1.9 1.6 - 2.6 mg/dL   Phosphorus    Collection Time: 08/06/22  5:03 AM   Result Value Ref Range    Phosphorus 3.7 2.7 - 4.5 mg/dL   TSH    Collection Time: 08/06/22  5:03 AM   Result Value Ref Range    TSH <0.010 (L) 0.400 - 4.000 uIU/mL   T4, Free    Collection Time: 08/06/22  5:03 AM   Result Value Ref Range    Free T4 1.46 0.71 - 1.51 ng/dL   COVID-19 Rapid Screening    Collection Time: 08/06/22  5:05 AM   Result Value Ref Range    SARS-CoV-2 RNA, Amplification, Qual Negative Negative   Calcium, Ionized    Collection Time: 08/06/22  6:29 AM   Result Value Ref Range    Ionized Calcium 0.96 (L) 1.06 - 1.42 mmol/L   Urinalysis, Reflex to Urine Culture Urine, Clean Catch    Collection Time: 08/06/22  7:59 AM    Specimen: Urine   Result Value Ref Range    Specimen UA Urine, Clean Catch     Color, UA Yellow Yellow, Straw, Peg    Appearance, UA Clear Clear    pH, UA 6.0 5.0 - 8.0    Specific Gravity, UA <=1.005 (A) 1.005 - 1.030    Protein, UA Negative Negative    Glucose, UA Negative Negative    Ketones, UA Negative Negative    Bilirubin (UA) Negative Negative    Occult Blood UA 1+ (A) Negative    Nitrite, UA Negative Negative    Urobilinogen, UA Negative <2.0 EU/dL    Leukocytes, UA  Negative Negative   Urinalysis Microscopic    Collection Time: 08/06/22  7:59 AM   Result Value Ref Range    RBC, UA 3 0 - 4 /hpf    Bacteria Occasional None-Occ /hpf    Microscopic Comment SEE COMMENT    Calcium    Collection Time: 08/06/22 12:47 PM   Result Value Ref Range    Calcium 8.0 (L) 8.7 - 10.5 mg/dL   Troponin I    Collection Time: 08/06/22 12:47 PM   Result Value Ref Range    Troponin I 0.010 0.000 - 0.026 ng/mL   POCT glucose    Collection Time: 08/06/22  7:47 PM   Result Value Ref Range    POCT Glucose 99 70 - 110 mg/dL   Calcium, Ionized    Collection Time: 08/07/22  4:05 AM   Result Value Ref Range    Ionized Calcium 1.08 1.06 - 1.42 mmol/L   CK    Collection Time: 08/07/22  4:05 AM   Result Value Ref Range     20 - 180 U/L   Comprehensive Metabolic Panel (CMP)    Collection Time: 08/07/22  4:06 AM   Result Value Ref Range    Sodium 139 136 - 145 mmol/L    Potassium 3.6 3.5 - 5.1 mmol/L    Chloride 104 95 - 110 mmol/L    CO2 25 23 - 29 mmol/L    Glucose 102 70 - 110 mg/dL    BUN 12 8 - 23 mg/dL    Creatinine 0.9 0.5 - 1.4 mg/dL    Calcium 8.5 (L) 8.7 - 10.5 mg/dL    Total Protein 6.5 6.0 - 8.4 g/dL    Albumin 3.1 (L) 3.5 - 5.2 g/dL    Total Bilirubin 0.6 0.1 - 1.0 mg/dL    Alkaline Phosphatase 48 (L) 55 - 135 U/L    AST 15 10 - 40 U/L    ALT 14 10 - 44 U/L    Anion Gap 10 8 - 16 mmol/L    eGFR >60 >60 mL/min/1.73 m^2   Magnesium    Collection Time: 08/07/22  4:06 AM   Result Value Ref Range    Magnesium 1.8 1.6 - 2.6 mg/dL   Phosphorus    Collection Time: 08/07/22  4:06 AM   Result Value Ref Range    Phosphorus 4.4 2.7 - 4.5 mg/dL   POCT glucose    Collection Time: 08/07/22  9:47 PM   Result Value Ref Range    POCT Glucose 109 70 - 110 mg/dL   Hemoglobin A1C    Collection Time: 08/08/22  4:21 AM   Result Value Ref Range    Hemoglobin A1C 6.0 (H) 4.0 - 5.6 %    Estimated Avg Glucose 126 68 - 131 mg/dL   Lipid panel    Collection Time: 08/08/22  4:21 AM   Result Value Ref Range    Cholesterol  154 120 - 199 mg/dL    Triglycerides 43 30 - 150 mg/dL    HDL 59 40 - 75 mg/dL    LDL Cholesterol 86.4 63.0 - 159.0 mg/dL    HDL/Cholesterol Ratio 38.3 20.0 - 50.0 %    Total Cholesterol/HDL Ratio 2.6 2.0 - 5.0    Non-HDL Cholesterol 95 mg/dL   Comprehensive Metabolic Panel (CMP)    Collection Time: 08/08/22  4:21 AM   Result Value Ref Range    Sodium 138 136 - 145 mmol/L    Potassium 3.5 3.5 - 5.1 mmol/L    Chloride 102 95 - 110 mmol/L    CO2 24 23 - 29 mmol/L    Glucose 108 70 - 110 mg/dL    BUN 9 8 - 23 mg/dL    Creatinine 0.9 0.5 - 1.4 mg/dL    Calcium 8.9 8.7 - 10.5 mg/dL    Total Protein 6.7 6.0 - 8.4 g/dL    Albumin 3.2 (L) 3.5 - 5.2 g/dL    Total Bilirubin 0.6 0.1 - 1.0 mg/dL    Alkaline Phosphatase 49 (L) 55 - 135 U/L    AST 14 10 - 40 U/L    ALT 15 10 - 44 U/L    Anion Gap 12 8 - 16 mmol/L    eGFR >60 >60 mL/min/1.73 m^2   Magnesium    Collection Time: 08/08/22  4:21 AM   Result Value Ref Range    Magnesium 1.7 1.6 - 2.6 mg/dL   Phosphorus    Collection Time: 08/08/22  4:21 AM   Result Value Ref Range    Phosphorus 4.4 2.7 - 4.5 mg/dL   POCT glucose    Collection Time: 08/08/22  7:27 AM   Result Value Ref Range    POCT Glucose 104 70 - 110 mg/dL   Echo Saline Bubble? Yes    Collection Time: 08/08/22  9:46 AM   Result Value Ref Range    AV mean gradient 5 mmHg    Ao peak perry 1.51 m/s    Ao VTI 31.90 cm    IVS 0.96 0.6 - 1.1 cm    LA size 2.89 cm    Left Atrium Major Axis 3.33 cm    Left Atrium Minor Axis 3.80 cm    LVIDd 4.03 3.5 - 6.0 cm    LVIDs 2.56 2.1 - 4.0 cm    LVOT diameter 1.97 cm    LVOT peak VTI 23.89 cm    Posterior Wall 0.79 0.6 - 1.1 cm    MV Peak A [place] 1.00 m/s    E wave deceleration time 320.07 msec    MV Peak E [place] 0.70 m/s    RA Major Axis 3.57 cm    RA Width 2.40 cm    TAPSE 2.51 cm    LA WIDTH 3.28 cm    Ao root annulus 2.81 cm    AORTIC VALVE CUSP SEPERATION 1.78 cm    PV PEAK VELOCITY 0.63 cm/s    MV stenosis pressure 1/2 time 83.78 ms    LV Diastolic Volume 71.37 mL    LV Systolic  "Volume 23.57 mL    LVOT peak jose 1.02 m/s    TDI LATERAL 0.08 m/s    TDI SEPTAL 0.08 m/s    MV mean gradient 1 mmHg    MV peak gradient 4 mmHg    RV S' 12.34 cm/s    MV VTI 26.64 cm    LV LATERAL E/E' RATIO 8.75 m/s    LV SEPTAL E/E' RATIO 8.75 m/s    FS 36 %    LA volume 28.60 cm3    LV mass 106.80 g    Left Ventricle Relative Wall Thickness 0.39 cm    AV valve area 2.28 cm2    AV Velocity Ratio 0.68     AV index (prosthetic) 0.75     MV valve area p 1/2 method 2.63 cm2    MV valve area by continuity eq 2.73 cm2    E/A ratio 0.70     Mean e' 0.08 m/s    LVOT area 3.0 cm2    LVOT stroke volume 72.78 cm3    AV peak gradient 9 mmHg    E/E' ratio 8.75 m/s    LV Systolic Volume Index 13.4 mL/m2    LV Diastolic Volume Index 40.55 mL/m2    LA Volume Index 16.2 mL/m2    LV Mass Index 61 g/m2    BSA 1.77 m2    Right Atrial Pressure (from IVC) 3 mmHg    EF 65 %   POCT glucose    Collection Time: 08/08/22 11:47 AM   Result Value Ref Range    POCT Glucose 111 (H) 70 - 110 mg/dL   POCT glucose    Collection Time: 08/08/22  4:23 PM   Result Value Ref Range    POCT Glucose 109 70 - 110 mg/dL      No results found for: PHENYTOIN, PHENOBARB, VALPROATE, CBMZ      EXAMINATION    VITALS   Vitals:    08/08/22 0739 08/08/22 0815 08/08/22 1100 08/08/22 2001   BP: (!) 141/66  (!) 143/67 131/61   BP Location:    Left arm   Patient Position:    Lying   Pulse: 92  89 69   Resp: 18  18 18   Temp: 98.7 °F (37.1 °C)  98.6 °F (37 °C) 97 °F (36.1 °C)   TempSrc:    Oral   SpO2: 96% 99% 98% 97%   Weight: 67.1 kg (148 lb)      Height: 5' 6" (1.676 m)           CONSTITUTIONAL  General Appearance: NAD, unremarkable, age appropriate, normal weight, neatly groomed, lying in bed, calm    MUSCULOSKELETAL  Muscle Strength and Tone: Attempted but unable to assess due to medical acuity    Abnormal Involuntary Movements: none observed  Gait and Station: Attempted but unable to assess due to medical acuity    PSYCHIATRIC   Behavior/Cooperation:  friendly and " "cooperative, eye contact normal, calm  Speech:  normal tone, normal rate, normal pitch, normal volume  Language: grossly intact, able to name, able to repeat with spontaneous speech  Mood: "I'm OK"  Affect:  congruent with mood ; mildly constricted   Associations: intact; no RUDY  Thought Process: Linear and Future-Oriented / Goal-Directed   Thought Content: denies SI, HI, AH, VH, TH, delusions, or paranoia (no RIS observed)   Sensorium:  Awake  Alert and Oriented: to person, place, city, state, month, year, and situation   Memory: 3/3 immediate, 2/3 at 5 minutes    Recent: Intact; able to report recent events   Remote: Intact; Named 4/4 past presidents   Attention/concentration: Intact. Appropriate for age/education. Able to spell w-[id number]-r-l-d & d-l-r-o-w.   Similarities: Intact (difference between apple and orange?)  Abstract reasoning: Intact  Fund of Knowledge: Named 4/4 past presidents  Insight: Intact  Judgment: Intact    CAM ICU Delirium Assessment - NEGATIVE    Is the patient aware of the biomedical complications associated with substance abuse and mental illness? yes        MEDICAL DECISION MAKING    ASSESSMENT        Generalized Anxiety Disorder   Unspecified Insomnia   (while Conversion Disorder is a possible diagnosis, the patient's only identifiable risk factors for conversion disorder would be her female gender and her dx of anxiety; She DENIES any recent significant stress or emotional trauma, DENIES any hx of physical or sexual abuse, DENIES any financial problems, and DENIES any family hx of Conversion D/O; this would result in a fairly low likelihood of conversion disorder.  Also, patient's complaint of BLE weakness for first 30 minutes after waking up doesn't appear consistent with conversion D/O).         RECOMMENDATIONS       - Patient does not currently meet PEC criteria due to not being an imminent threat to self/others and not being gravely disabled 2/2 mental illness.      - Continue Cymbalta 30 " mg PO daily for anxiety & pain and begin Remeron 7.5 mg PO QHS for insomnia & anxiety (discussed risks/benefits/alt vs no treatment with patient).     - Defer non-psychiatric medications / management to the primary medicine treatment team.      - Psychotherapy was performed with the patient as noted above with a focus on improving anxiety and sleep.      - The patient's most recent resulted labs, imaging, and EKG were reviewed today.       - Please have CM/SW assist patient with formal outpatient mental health f/u for s/p discharge from this facility.      - Patient was instructed to call 911 and return to the nearest ED if she begins feeling suicidal, homicidal, or gravely disabled (for s/p this hospitalization).       - Thank you for this consult         Total time spent with patient and/or managing/coordinating patient's care today (excluding the time spent on psychotherapy): 61 minutes   Time spent on psychotherapy today (as noted above): 17 minutes   Total time for encounter today including psychotherapy: 78 minutes      More than 50% of the time was spent counseling/coordinating care.     Consulting clinician was informed of the encounter and consult note.     Consultation ended: 8/8/2022 at 10:17 PM       STAFF:  Jose Antonio Jeffries MD  [facility]   8/8/2022

## 2022-08-09 NOTE — HOSPITAL COURSE
Erica Masterson was monitored closely during her hospitalization.  She was given IV calcium and her levels were monitored closely.  She was started on oral calcium supplementation tid.  TSH was noted to be low.  Pt advised that her levothyroxine had been decreased about 4 days prior due to low TSH, so her home dose was continued.  PT/OT were consulted.  Her symptoms persisted despite normal calcium levels and neurology was consulted.  She underwent MRI and MRA of the head without findings to explain her symptoms.  US bilateral carotid arteries was performed with no significant stenosis identified.  MRI lumbar spine identified mild grade 1 anterolisthesis of L4 on L5 and multilevel lumbar spondylosis.  No significant spinal canal stenosis or neural foraminal stenosis, and circumferential disc bulge at L4-L5 contacts the descending bilateral L5 nerve roots.  She was to be discharged home to f/u with neurosurgery outpatient, but she had return of BLE with difficulty with ambulation, so neurosurgery was consulted.  They concluded that findings of MRI L-spine did not explain symptoms and recommended MRI t-spine for thoroughness.  This was performed as well with no acute findings.  Pt again was to be discharged with return of symptoms presenting upon awakening from a nap.  Neurology recommended psychiatric evaluation to r/o conversion disorder.  This was performed and recommendations included begin Remeron 7.5 mg nightly and continue chronic Cymbalta with outpatient follow up.  He concluded that conversion disorder is unlikely. She was cleared for discharge from the standpoint of neurology and neurosurgery.  Follow up appointments were arranged.  Home health was ordered.  Return precautions were discussed at length.  She was discharged home in good condition.

## 2022-08-09 NOTE — TELEPHONE ENCOUNTER
----- Message from Anand Roque MA sent at 8/4/2022  4:15 PM CDT -----  Regarding: Medication Side Effects  Patient called stating that she is having tightness in her leg,arms, and neck, dry mouth,insomnia, and weakness.She is wondering if it side effect for thyroid medication.    Please Advise.    Thanks,  Anand JASMINE

## 2022-08-09 NOTE — PT/OT/SLP PROGRESS
Physical Therapy Treatment    Patient Name:  Erica Masterson   MRN:  [id number]    Recommendations:     Discharge Recommendations:  home   Discharge Equipment Recommendations:  (TBD (probably none))   Barriers to discharge: None    Assessment:     Erica Masterson is a 76 y.o. female admitted with a medical diagnosis of Hypocalcemia.  She presents with the following impairments/functional limitations:  weakness . Reports feeling better at this time. Ambulated 150' with SBA for safety.     Rehab Prognosis: Good; patient would benefit from acute skilled PT services to address these deficits and reach maximum level of function.    Recent Surgery: * No surgery found *      Plan:     During this hospitalization, patient to be seen 6 x/week to address the identified rehab impairments via gait training, therapeutic activities, therapeutic exercises and progress toward the following goals:    · Plan of Care Expires:  08/08/22    Subjective     Chief Complaint: none at this time.  Patient/Family Comments/goals: to return home  Pain/Comfort:  · Pain Rating 1: 0/10      Objective:     Communicated with nurse Rucker prior to session.  Patient found supine with telemetry upon PT entry to room.     General Precautions: Standard, fall   Orthopedic Precautions:N/A   Braces: N/A  Respiratory Status: Room air     Functional Mobility:  · Bed Mobility:     · Rolling Left:  modified independence  · Rolling Right: modified independence  · Supine to Sit: modified independence  · Sit to Supine: modified independence  · Transfers:     · Sit to Stand:  supervision with no AD  · Gait: 150' with SBA      AM-PAC 6 CLICK MOBILITY          Therapeutic Activities and Exercises:   Ambulated 150' in hallway with SBA, no assistive device required.    Patient left supine with all lines intact, call button in reach and nurse Rucker notified..    GOALS:   Multidisciplinary Problems     Physical Therapy Goals        Problem: Physical Therapy    Goal Priority  Disciplines Outcome Goal Variances Interventions   Physical Therapy Goal     PT, PT/OT Ongoing, Progressing     Description: Goals to be met by: 8/15/2022     Patient will increase functional independence with mobility by performin). Supine to sit with Modified [place]  2). Sit to supine with Modified [place]  3). Sit to stand transfer with Modified [place]  4). Gait  x > 200 feet with Modified [place]                      Time Tracking:     PT Received On: 22  PT Start Time: 925     PT Stop Time: 935  PT Total Time (min): 10 min     Billable Minutes: Gait Training 10min    Treatment Type: Treatment  PT/PTA: PTA     PTA Visit Number: 2     2022

## 2022-08-09 NOTE — PLAN OF CARE
[facility] accepted pt for  services. Plan to admit tomorrow at home       08/09/22 1002   Post-Acute Status   Post-Acute Authorization Home Health   Home Health Status Set-up Complete/Auth obtained   Discharge Plan   Discharge Plan A Home Health   Discharge Plan B Home with family

## 2022-08-09 NOTE — PLAN OF CARE
Alert and oriented X4. Skin warm, pink and dry. Heart sounds S1 S2. Lungs clear. Abdomen round and soft with active bowel sounds all quadrants. Complained of a cramp to the LLE X1.  She was wondering if it was from the Melatonin. Refused pain medication at this time. Refused blood glucose level at HS. Explained the risks and benefits. Updated the NP with the following:C/o left sided weakness /numbness  and tingling from her face  down to her feet. Upon assessment  mild left sided weakness but able to lift against gravity. /86. No facial droop and no arm drift. Equal pupil light response. How would you like to proceed with these findings?  He rseponded with monitor her and she has been complaining  About these symptoms in the past.

## 2022-08-09 NOTE — PLAN OF CARE
Problem: Physical Therapy  Goal: Physical Therapy Goal  Description: Goals to be met by: 8/15/2022     Patient will increase functional independence with mobility by performin). Supine to sit with Modified [place]  2). Sit to supine with Modified [place]  3). Sit to stand transfer with Modified [place]  4). Gait  x > 200 feet with Modified [place]     Outcome: Ongoing, Progressing   Ambulate with assistance for safety.

## 2022-08-09 NOTE — TELEPHONE ENCOUNTER
Lab Results   Component Value Date    TSH <0.010 (L) 08/06/2022    FREET4 1.46 08/06/2022     tsh low, free t4 normal  On 88 mcg/day, recent decrease.  Due for repeat in a month or two.

## 2022-08-11 ENCOUNTER — TELEPHONE (OUTPATIENT)
Dept: FAMILY MEDICINE | Facility: CLINIC | Age: 77
End: 2022-08-11
Payer: MEDICARE

## 2022-08-11 ENCOUNTER — TELEPHONE (OUTPATIENT)
Dept: GASTROENTEROLOGY | Facility: CLINIC | Age: 77
End: 2022-08-11
Payer: MEDICARE

## 2022-08-11 DIAGNOSIS — R42 DIZZINESS: ICD-10-CM

## 2022-08-11 DIAGNOSIS — R25.1 TREMOR: Primary | ICD-10-CM

## 2022-08-11 DIAGNOSIS — G25.9 EXTRAPYRAMIDAL AND MOVEMENT DISORDER, UNSPECIFIED: ICD-10-CM

## 2022-08-11 NOTE — TELEPHONE ENCOUNTER
"----- Message from Beatrice Lakhani sent at 8/11/2022  9:00 AM CDT -----  Contact: Pt  Type: Needs Medical Advice    Who Called:  Pt    Symptoms (please be specific):  Legs wobbling, voice trembling, shoulder "caving in"    How long has patient had these symptoms:  Since she was discharged from hospital on Tuesday    Best Call Back Number: [phone] or [phone]    Additional Information: Please call back. Thanks.       "

## 2022-08-11 NOTE — TELEPHONE ENCOUNTER
Spoke with patient to let her know that Dr. Britton has given the okay to hold the Protonix. If symptoms persist she will need to follow up with her PCP.

## 2022-08-11 NOTE — TELEPHONE ENCOUNTER
Pantoprazole like all of the PPIs can cause B12 and magnesium deficiencies but only after many months or years of use.  Headaches are a fairly common side effect the rest of those are not.  Most of them do not even appear to be pantoprazole side effects so I am not sure where the pharmacist got his information.  Please bring that report to the office visit.  We are going to need to check her before doing any lab work

## 2022-08-11 NOTE — TELEPHONE ENCOUNTER
Patient states she is currently prescribed Pantoprazole. States she has been experiencing dizziness, fast heart rate, jitteriness, jerking and shaking tremor like movements, muscle weakness, spasms of hand and feet, cramping muscle aches, nancy voice, headaches, and nausea. States symptoms have been present since being discharged from the hospital on Tuesday. Patient received a printout from the pharmacist that states these are possible side effects from Pantoprazole. States the printout also states this medication can cause Vitamin B-12 deficiency and low magnesium levels. Patient has an existing hospital follow up appointment on 8-15-22. Requesting to send a message to Dr. Myers request to know if he would be willing to place lab orders. Patient would like to have labs done before appointment on 8-15-22, so that the results can be reviewed with her at appointment. Please advise. Thank you.

## 2022-08-11 NOTE — TELEPHONE ENCOUNTER
Reached out to patient to assess her concerns. Patient states that she has been having tremors, tachycardia, headaches, nausea and dizziness. Patient state sthat she has been having these symptoms a while but have recently gotten worse. Will forward to provider for advice.

## 2022-08-11 NOTE — TELEPHONE ENCOUNTER
----- Message from Willian Lara, Patient Care Assistant sent at 8/11/2022 10:14 AM CDT -----  Contact: Pt  Type: Needs Medical Advice    Who Called: Pt  Best Call Back Number: [phone]  Inquiry/Question: Pt is calling to see if she can be placed on a different medication due to having side effects from the pantoprazole (PROTONIX) 40 MG tablet. Pt states she is having the following symptoms: dizziness, fast heart rate, trimmers,muscle weakness,nausea,headaches. Pt states she have a literature from the pharm that states this medication causes these symptoms. Pt is requesting orders for lab to check Vitamin B and Magnesium levels. Please call back and advise. Thank you~

## 2022-08-11 NOTE — TELEPHONE ENCOUNTER
----- Message from Sienna Laird MA sent at 8/11/2022  2:38 PM CDT -----  Contact: [facility]  Type: Needs Medical Advice    Who Called:sonu ---Pipestem greyson   Best Call Back Number: [phone]  Inquiry/Question: Please call regarding medication concerns      Thank you~

## 2022-08-11 NOTE — TELEPHONE ENCOUNTER
Maci with [facility] reports during home health visit today patient reported she is having multiple side effects from Pantoprazole. Writer advised Mrs. Lux patient contacted the office today regarding matter. Advised of instruction from Dr. Myers. Verbalized understanding.

## 2022-08-12 ENCOUNTER — HOSPITAL ENCOUNTER (EMERGENCY)
Facility: HOSPITAL | Age: 77
Discharge: HOME | End: 2022-08-12
Attending: EMERGENCY MEDICINE
Payer: MEDICARE

## 2022-08-12 VITALS
DIASTOLIC BLOOD PRESSURE: 90 MMHG | BODY MASS INDEX: 23.63 KG/M2 | RESPIRATION RATE: 15 BRPM | SYSTOLIC BLOOD PRESSURE: 139 MMHG | TEMPERATURE: 98 F | HEART RATE: 76 BPM | HEIGHT: 66 IN | OXYGEN SATURATION: 99 % | WEIGHT: 147 LBS

## 2022-08-12 DIAGNOSIS — R53.1 WEAKNESS: ICD-10-CM

## 2022-08-12 DIAGNOSIS — R25.2 SPASMS OF THE HANDS OR FEET: ICD-10-CM

## 2022-08-12 DIAGNOSIS — E83.51 HYPOCALCEMIA: Primary | ICD-10-CM

## 2022-08-12 PROBLEM — M62.838 MUSCLE SPASMS OF BOTH LOWER EXTREMITIES: Status: ACTIVE | Noted: 2022-01-24

## 2022-08-12 LAB
ALBUMIN SERPL BCP-MCNC: 3.7 G/DL (ref 3.5–5.2)
ALP SERPL-CCNC: 52 U/L (ref 55–135)
ALT SERPL W/O P-5'-P-CCNC: 23 U/L (ref 10–44)
ANION GAP SERPL CALC-SCNC: 12 MMOL/L (ref 8–16)
AST SERPL-CCNC: 24 U/L (ref 10–40)
BASOPHILS # BLD AUTO: 0.04 K/UL (ref 0–0.2)
BASOPHILS NFR BLD: 0.9 % (ref 0–1.9)
BILIRUB SERPL-MCNC: 0.4 MG/DL (ref 0.1–1)
BUN SERPL-MCNC: 12 MG/DL (ref 8–23)
CALCIUM SERPL-MCNC: 8.6 MG/DL (ref 8.7–10.5)
CHLORIDE SERPL-SCNC: 102 MMOL/L (ref 95–110)
CO2 SERPL-SCNC: 25 MMOL/L (ref 23–29)
CREAT SERPL-MCNC: 1.1 MG/DL (ref 0.5–1.4)
DIFFERENTIAL METHOD: ABNORMAL
EOSINOPHIL # BLD AUTO: 0.1 K/UL (ref 0–0.5)
EOSINOPHIL NFR BLD: 1.6 % (ref 0–8)
ERYTHROCYTE [DISTWIDTH] IN BLOOD BY AUTOMATED COUNT: 13 % (ref 11.5–14.5)
EST. GFR  (NO RACE VARIABLE): 51 ML/MIN/1.73 M^2
GLUCOSE SERPL-MCNC: 97 MG/DL (ref 70–110)
HCT VFR BLD AUTO: 36.3 % (ref 37–48.5)
HGB BLD-MCNC: 12.1 G/DL (ref 12–16)
IMM GRANULOCYTES # BLD AUTO: 0.01 K/UL (ref 0–0.04)
IMM GRANULOCYTES NFR BLD AUTO: 0.2 % (ref 0–0.5)
LYMPHOCYTES # BLD AUTO: 1.1 K/UL (ref 1–4.8)
LYMPHOCYTES NFR BLD: 25.5 % (ref 18–48)
MCH RBC QN AUTO: 29.2 PG (ref 27–31)
MCHC RBC AUTO-ENTMCNC: 33.3 G/DL (ref 32–36)
MCV RBC AUTO: 88 FL (ref 82–98)
MONOCYTES # BLD AUTO: 0.3 K/UL (ref 0.3–1)
MONOCYTES NFR BLD: 7.4 % (ref 4–15)
NEUTROPHILS # BLD AUTO: 2.8 K/UL (ref 1.8–7.7)
NEUTROPHILS NFR BLD: 64.4 % (ref 38–73)
NRBC BLD-RTO: 0 /100 WBC
PLATELET # BLD AUTO: 242 K/UL (ref 150–450)
PMV BLD AUTO: 9.4 FL (ref 9.2–12.9)
POTASSIUM SERPL-SCNC: 3.8 MMOL/L (ref 3.5–5.1)
PROT SERPL-MCNC: 7.4 G/DL (ref 6–8.4)
RBC # BLD AUTO: 4.14 M/UL (ref 4–5.4)
SODIUM SERPL-SCNC: 139 MMOL/L (ref 136–145)
WBC # BLD AUTO: 4.32 K/UL (ref 3.9–12.7)

## 2022-08-12 PROCEDURE — 80053 COMPREHEN METABOLIC PANEL: CPT | Performed by: PHYSICIAN ASSISTANT

## 2022-08-12 PROCEDURE — 63600175 PHARM REV CODE 636 W HCPCS: Performed by: EMERGENCY MEDICINE

## 2022-08-12 PROCEDURE — 99284 EMERGENCY DEPT VISIT MOD MDM: CPT | Mod: 25

## 2022-08-12 PROCEDURE — 25000003 PHARM REV CODE 250: Performed by: EMERGENCY MEDICINE

## 2022-08-12 PROCEDURE — 93010 ELECTROCARDIOGRAM REPORT: CPT | Mod: ,,, | Performed by: INTERNAL MEDICINE

## 2022-08-12 PROCEDURE — 93010 EKG 12-LEAD: ICD-10-PCS | Mod: ,,, | Performed by: INTERNAL MEDICINE

## 2022-08-12 PROCEDURE — 36415 COLL VENOUS BLD VENIPUNCTURE: CPT | Performed by: PHYSICIAN ASSISTANT

## 2022-08-12 PROCEDURE — 85025 COMPLETE CBC W/AUTO DIFF WBC: CPT | Performed by: PHYSICIAN ASSISTANT

## 2022-08-12 PROCEDURE — 96367 TX/PROPH/DG ADDL SEQ IV INF: CPT

## 2022-08-12 PROCEDURE — 93005 ELECTROCARDIOGRAM TRACING: CPT

## 2022-08-12 PROCEDURE — 96365 THER/PROPH/DIAG IV INF INIT: CPT

## 2022-08-12 PROCEDURE — 96366 THER/PROPH/DIAG IV INF ADDON: CPT

## 2022-08-12 RX ORDER — FAMOTIDINE 20 MG/1
20 TABLET, FILM COATED ORAL 2 TIMES DAILY
Qty: 30 TABLET | Refills: 0 | Status: SHIPPED | OUTPATIENT
Start: 2022-08-12 | End: 2022-08-12 | Stop reason: SDUPTHER

## 2022-08-12 RX ORDER — DIAZEPAM 5 MG/1
5 TABLET ORAL
Status: COMPLETED | OUTPATIENT
Start: 2022-08-12 | End: 2022-08-12

## 2022-08-12 RX ORDER — CALCIUM GLUCONATE 20 MG/ML
2 INJECTION, SOLUTION INTRAVENOUS
Status: COMPLETED | OUTPATIENT
Start: 2022-08-12 | End: 2022-08-12

## 2022-08-12 RX ORDER — MAGNESIUM SULFATE HEPTAHYDRATE 40 MG/ML
2 INJECTION, SOLUTION INTRAVENOUS
Status: COMPLETED | OUTPATIENT
Start: 2022-08-12 | End: 2022-08-12

## 2022-08-12 RX ORDER — FAMOTIDINE 20 MG/1
20 TABLET, FILM COATED ORAL 2 TIMES DAILY
Qty: 30 TABLET | Refills: 0 | Status: SHIPPED | OUTPATIENT
Start: 2022-08-12 | End: 2022-08-29 | Stop reason: SDUPTHER

## 2022-08-12 RX ADMIN — DIAZEPAM 5 MG: 5 TABLET ORAL at 12:08

## 2022-08-12 RX ADMIN — CALCIUM GLUCONATE 1 G: 20 INJECTION, SOLUTION INTRAVENOUS at 12:08

## 2022-08-12 RX ADMIN — MAGNESIUM SULFATE 2 G: 2 INJECTION INTRAVENOUS at 02:08

## 2022-08-12 NOTE — ED PROVIDER NOTES
Encounter Date: 8/12/2022       History     Chief Complaint   Patient presents with    Spasms    Weakness     Patient states that she has been having spasms, nausea, weakness patient states she thinks it is from her medication Pantoprazole and was seen Saturday and was released hasnt taken the medication since but it still having the same symptoms      Chief complaint:  Spasms    HPI:  68-year-old female presents with spasms of the hand and feet.  She was admitted last week for identical symptoms.  During admission she was found to have hypocalcemia.  She reports that the symptoms persisted despite treatment.  She denies any weakness or numbness.  She reports transient speech impairment.  She has no headache, visual changes or seizures.  Past medical history is significant for an unspecified myopathy, multinodular goiter, hypertension, hyperlipidemia, anxiety, TB with right middle lobe ectomy        Review of patient's allergies indicates:  No Known Allergies  Past Medical History:   Diagnosis Date    Adenomatous polyp of colon 3/26/2012    Allergy     Anxiety     Cancer     Cataract     Colon polyp     Degenerative arthritis of knee 04/03/2012    Diverticulosis     Encounter for blood transfusion     GERD (gastroesophageal reflux disease)     History of thyroid cancer 2021    Hyperlipidemia     Hypertension     Lateral meniscal tear 5/20/2013    Multinodular goiter 03/26/2012    Myopathy, unspecified 01/18/2010    Tuberculosis      Past Surgical History:   Procedure Laterality Date    BREAST BIOPSY Left 2015    benign    CHOLECYSTECTOMY      COLONOSCOPY  12/2/15    Dr. Britton, multiple polyps, recheck five years-three years if more than 2 polyps are adenomatous    COLONOSCOPY N/A 12/2/2015    COLONOSCOPY N/A 3/20/2019    Procedure: COLONOSCOPY;  Surgeon: Jose Britton MD;  [facility];  Service: Endoscopy;  Laterality: N/A;    COLONOSCOPY N/A 5/20/2020    Dr. Britton; internal  hemorrhoids; diverticulosis; polyps removed; repeat in 3 years    CYSTOSCOPY N/A 8/26/2020    Procedure: CYSTOSCOPY;  Surgeon: Charlotte Walters Jr., MD;  Location: [facility];  Service: Urology;  Laterality: N/A;    DISSECTION OF NECK Left 9/13/2021    Procedure: DISSECTION, NECK;  Surgeon: Ryan Torres MD;  Location: [facility] OR 2ND FLR;  Service: ENT;  Laterality: Left;    ESOPHAGOGASTRODUODENOSCOPY N/A 5/20/2020    Dr. Britton; small hiatal hernia; gastritis; 8 gastric polyps removed    ESOPHAGOGASTRODUODENOSCOPY N/A 4/1/2022    Procedure: EGD (ESOPHAGOGASTRODUODENOSCOPY);  Surgeon: Jose Britton MD;  Location: [facility];  Service: Endoscopy;  Laterality: N/A;    FOOT SURGERY      HYSTERECTOMY      TVH/BSO > 20 years    INTRALUMINAL GASTROINTESTINAL TRACT IMAGING VIA CAPSULE N/A 6/4/2020    Procedure: IMAGING PROCEDURE, GI TRACT, INTRALUMINAL, VIA CAPSULE;  Surgeon: Jose Britton MD;  Location: [facility];  Service: Endoscopy;  Laterality: N/A;    KNEE SURGERY  06/06/2013    right knee tear Dr Iverson     LAPAROSCOPIC CHOLECYSTECTOMY N/A 9/17/2020    Procedure: CHOLECYSTECTOMY, LAPAROSCOPIC;  Surgeon: Forrest Veronica MD;  Location: [facility];  Service: General;  Laterality: N/A;    LUNG LOBECTOMY  1966    right middle lobectomy, due to Tb    OOPHORECTOMY      SHOULDER SURGERY      francis     THYROIDECTOMY Bilateral 5/19/2021    Procedure: THYROIDECTOMY;  Surgeon: Jamia Amezquita MD;  [facility];  Service: General;  Laterality: Bilateral;    THYROIDECTOMY Bilateral 9/13/2021    Procedure: THYROIDECTOMY WITH INTRA-OP PTH;  Surgeon: Ryan Torres MD;  Location: [facility] OR 2ND FLR;  Service: ENT;  Laterality: Bilateral;  NIM tube  Intraop PTH     Family History   Problem Relation Age of Onset    Colon cancer Other     Cancer Mother     Hypertension Mother     Hyperlipidemia Mother     Cancer Brother     Hypertension Father     Emphysema Father     Diabetes Son      Hypertension Son     Alcohol abuse Son     Diabetes Maternal Aunt     Alzheimer's disease Maternal Uncle     Cancer Maternal Grandmother     No Known Problems Daughter     Dementia Sister     Alzheimer's disease Sister     Breast cancer Sister 60    Obesity Paternal Uncle     Prostate cancer Other     Cancer Brother     Melanoma Neg Hx     Psoriasis Neg Hx     Lupus Neg Hx     Eczema Neg Hx     Amblyopia Neg Hx     Blindness Neg Hx     Cataracts Neg Hx     Glaucoma Neg Hx     Macular degeneration Neg Hx     Retinal detachment Neg Hx     Strabismus Neg Hx     Stroke Neg Hx     Thyroid cancer Neg Hx     Colon polyps Neg Hx     Ulcerative colitis Neg Hx     Stomach cancer Neg Hx     Rectal cancer Neg Hx      Social History     Tobacco Use    Smoking status: Never Smoker    Smokeless tobacco: Never Used   Substance Use Topics    Alcohol use: Not Currently     Comment: stopped 2019    Drug use: No     Review of Systems   Constitutional: Negative for activity change, appetite change, chills, fatigue and fever.   Eyes: Negative for visual disturbance.   Respiratory: Negative for apnea and shortness of breath.    Cardiovascular: Negative for chest pain and palpitations.   Gastrointestinal: Negative for abdominal distention and abdominal pain.   Genitourinary: Negative for difficulty urinating.   Musculoskeletal: Negative for neck pain.   Skin: Negative for pallor and rash.   Neurological: Positive for speech difficulty. Negative for weakness and headaches.   Hematological: Does not bruise/bleed easily.   Psychiatric/Behavioral: Negative for agitation.       Physical Exam     Initial Vitals [08/12/22 0957]   BP Pulse Resp Temp SpO2   (!) 180/79 78 20 98.6 °F (37 °C) 100 %      MAP       --         Physical Exam    Nursing note and vitals reviewed.  Constitutional: She appears well-developed and well-nourished.   HENT:   Head: Normocephalic and atraumatic.   Eyes: Conjunctivae are normal.    Neck: Neck supple.   Normal range of motion.  Cardiovascular: Normal rate, regular rhythm and normal heart sounds. Exam reveals no gallop and no friction rub.    No murmur heard.  Pulmonary/Chest: Effort normal and breath sounds normal. No respiratory distress. She has no wheezes. She has no rhonchi. She has no rales.   Abdominal: Abdomen is soft. She exhibits no distension. There is no abdominal tenderness.   Musculoskeletal:         General: Normal range of motion.      Cervical back: Normal range of motion and neck supple.     Neurological: She is alert and oriented to person, place, and time. She has normal strength. She displays normal reflexes. No cranial nerve deficit or sensory deficit. GCS score is 15. GCS eye subscore is 4. GCS verbal subscore is 5. GCS motor subscore is 6.   Skin: Skin is warm and dry. No erythema.   Psychiatric: She has a normal mood and affect.         ED Course   Procedures  Labs Reviewed   CBC W/ AUTO DIFFERENTIAL - Abnormal; Notable for the following components:       Result Value    Hematocrit 36.3 (*)     All other components within normal limits   COMPREHENSIVE METABOLIC PANEL - Abnormal; Notable for the following components:    Calcium 8.6 (*)     Alkaline Phosphatase 52 (*)     eGFR 51 (*)     All other components within normal limits     EKG Readings: (Independently Interpreted)   Rhythm: Normal Sinus Rhythm. Heart Rate: 66. Ectopy: No Ectopy. Conduction: Normal. ST Segments: Normal ST Segments. T Waves: Normal. Axis: Normal. Clinical Impression: Normal Sinus Rhythm       Imaging Results    None          Medications   calcium gluconate 1 g in NS IVPB (premixed) (has no administration in time range)   magnesium sulfate 2g in water 50mL IVPB (premix) (has no administration in time range)   diazePAM tablet 5 mg (has no administration in time range)     Medical Decision Making:   ED Management:  76-year-old female returns with spasms of her hands and feet.  Upon discharge the  symptoms have resolved.  She does have mild hypokalemia which may or may not be responsible for her symptoms.  She is given both calcium gluconate and magnesium sulfate.  The symptoms or more likely to reflect a psychiatric underlying cause.  I discussed the case with Hospital Medicine who evaluated the patient and does not feel that she will benefit from inpatient treatment.  She has a neurology appointment to explore other options for ongoing symptoms.                      Clinical Impression:   Final diagnoses:  [R53.1] Weakness                 Chan Young III, MD  08/12/22 0307

## 2022-08-12 NOTE — ED NOTES
Attempted to place 2nd IV to start 2nd medication.  Unsuccessful.  Will ask a 2nd nurse. MD aware.

## 2022-08-12 NOTE — ED NOTES
Started IV calcium.  Called pharmacy, recommended to run one at a time in same IV.  Plan to insert 2nd IV for magnesium.  Assisted pt on bedpan and collected clear yellow urine for sample if needed.  Pt tolerated movement well.  She has decreased strength in right but she reports this is no change from her previous.  She is alert and oriented x4. Visitor has left at this time.

## 2022-08-12 NOTE — ED NOTES
Pt arrives from home with multiple complaints.  She reports feeling shaky and unsteady on her feet.  She states she was here a few days ago and was given a prescription for Remeron and to f/u with neurologist.  Pt lets shaking, provided support while standing and moving to bed.  Pt moved easy with assistance.  Placed pt on monitor. Call bell in reach.

## 2022-08-12 NOTE — FIRST PROVIDER EVALUATION
Emergency Department TeleTriage Encounter Note      CHIEF COMPLAINT    Chief Complaint   Patient presents with    Spasms    Weakness     Patient states that she has been having spasms, nausea, weakness patient states she thinks it is from her medication Pantoprazole and was seen Saturday and was released hasnt taken the medication since but it still having the same symptoms        VITAL SIGNS   Initial Vitals [08/12/22 0957]   BP Pulse Resp Temp SpO2   (!) 180/79 78 20 98.6 °F (37 °C) 100 %      MAP       --            ALLERGIES    Review of patient's allergies indicates:  No Known Allergies    PROVIDER TRIAGE NOTE  Patient is a 76-year-old female who presents with nausea, weakness and spasms for over 1 year.  She reports symptoms are intermittent.  She states she was seen here recently for the same symptoms.  She was concerned that it may be being caused by her Protonix which she stopped 3 days ago.  She denies any changes in her symptoms over the last year.  She is well appearing.  She is in no acute distress.  Vital signs are stable.  Will place initial basic workup.    Initial orders will be placed and care will be transferred to an alternate provider when patient is roomed for a full evaluation. Any additional orders and the final disposition will be determined by that provider.        ORDERS  Labs Reviewed - No data to display    ED Orders (720h ago, onward)    Start Ordered     Status Ordering Provider    08/12/22 1012 08/12/22 1011  EKG 12-lead  Once         Ordered PRIMITIVO SCHNEIDER    08/12/22 1012 08/12/22 1011  CBC auto differential  STAT         Ordered PRIMITIVO SCHNEIDER    08/12/22 1012 08/12/22 1011  Comprehensive metabolic panel  STAT         Ordered PRIMITIVO SCHNEIDER            Virtual Visit Note: The provider triage portion of this emergency department evaluation and documentation was performed via [employer], a HIPAA-compliant telemedicine application, in concert  with a tele-presenter in the room. A face to face patient evaluation with one of my colleagues will occur once the patient is placed in an emergency department room.      DISCLAIMER: This note was prepared with [employer] voice recognition transcription software. Garbled syntax, mangled pronouns, and other bizarre constructions may be attributed to that software system.

## 2022-08-13 NOTE — ASSESSMENT & PLAN NOTE
Previous imaging performed inpatient unremarkable. Patient counseled to follow up with her PCP and with Neurology for further possible workup and to continue baclofen and gabapentin. No indication for hospitalization at this time. Case discussed with Dr. Young who is in agreement with patient's disposition.

## 2022-08-13 NOTE — DISCHARGE SUMMARY
[facility] Medicine  Discharge Summary      Patient Name: Erica Masterson  MRN: [id number]  Patient Class: OP- Observation  Admission Date: 8/6/2022  Hospital Length of Stay: 0 days  Discharge Date and Time: 8/9/2022  1:54 PM  Attending Physician: Betzaida att. providers found   Discharging Provider: KAREN Madsen  Primary Care Provider: Narayan Myers MD      HPI:   Erica Masterson is a 75 y/o F with a past medical history significant for HTN, HLD, anxiety, and thyroid cancer s/p thyroidectomy who presented to the ED after finding herself unable to ambulate early this morning.  Reports that she has had intermittent episodes of N/T to her extremities and episodes of difficutly ambulating that usually resolve spontaneously before she can get to the ED to be assessed.  When such episodes have occurred, she reports that she has been diagnosed with anxiety as no other reason has been determined. She has had pain and decreased ROM to the left shoulder which pt states is being treated by orthopedics.  Symptoms seemed to be worse today than usual.  Denies recent fever, chest pain, SOB, dysuria, difficulty with speech or swallowing or any other symptoms. Reports decreasing her synthroid dose a few days ago at the direction of her endocrinologist, but no other medication changes.  ED workup is significant for hypocalcemia.  She is placed in observation under the service of hospital medicine for continued monitoring and treatment.          * No surgery found *      Hospital Course:   Erica Masterson was monitored closely during her hospitalization.  She was given IV calcium and her levels were monitored closely.  She was started on oral calcium supplementation tid.  TSH was noted to be low.  Pt advised that her levothyroxine had been decreased about 4 days prior due to low TSH, so her home dose was continued.  PT/OT were consulted.  Her symptoms persisted despite normal calcium levels and  neurology was consulted.  She underwent MRI and MRA of the head without findings to explain her symptoms.  US bilateral carotid arteries was performed with no significant stenosis identified.  MRI lumbar spine identified mild grade 1 anterolisthesis of L4 on L5 and multilevel lumbar spondylosis.  No significant spinal canal stenosis or neural foraminal stenosis, and circumferential disc bulge at L4-L5 contacts the descending bilateral L5 nerve roots.  She was to be discharged home to f/u with neurosurgery outpatient, but she had return of BLE with difficulty with ambulation, so neurosurgery was consulted.  They concluded that findings of MRI L-spine did not explain symptoms and recommended MRI t-spine for thoroughness.  This was performed as well with no acute findings.  Pt again was to be discharged with return of symptoms presenting upon awakening from a nap.  Neurology recommended psychiatric evaluation to r/o conversion disorder.  This was performed and recommendations included begin Remeron 7.5 mg nightly and continue chronic Cymbalta with outpatient follow up.  He concluded that conversion disorder is unlikely. She was cleared for discharge from the standpoint of neurology and neurosurgery.  Follow up appointments were arranged.  Home health was ordered.  Return precautions were discussed at length.  She was discharged home in good condition.       Goals of Care Treatment Preferences:  Code Status: Full Code      Consults:   Consults (From admission, onward)        Status Ordering Provider     Inpatient consult to Telemedicine - Psych  Once        Provider:  Jose Antonio Jeffries MD    Completed JOSE F ELY     Inpatient consult to Neurosurgery  Once        Provider:  Radha Thurman MD    Completed JOSE F ELY          No new Assessment & Plan notes have been filed under this hospital service since the last note was generated.  Service: Hospital Medicine    Final Active Diagnoses:    Diagnosis Date  Noted POA    Muscle spasms of both lower extremities [M62.838] 01/24/2022 Yes    Postoperative hypothyroidism [E89.0] 07/29/2021 Yes     Chronic    RONNIE (generalized anxiety disorder), 2019 [F41.1] 08/03/2020 Yes     Chronic    Type 2 diabetes mellitus, without long-term current use of insulin, dx 3/2020 [E11.9] 03/24/2020 Yes     Chronic    GERD (gastroesophageal reflux disease) [K21.9] 05/28/2015 Yes    PAD (peripheral artery disease) [I73.9] 01/30/2014 Yes     Chronic    Hypertension associated with diabetes [E11.59, I15.2]  Yes     Chronic    Hyperlipidemia associated with type 2 diabetes mellitus, baseline  [E11.69, E78.5] 03/26/2012 Yes     Chronic      Problems Resolved During this Admission:    Diagnosis Date Noted Date Resolved POA    PRINCIPAL PROBLEM:  Hypocalcemia [E83.51] 08/06/2022 08/12/2022 Yes       Discharged Condition: good    Disposition: Home-Health Care [facility]    Follow Up:   Follow-up Information     Clarence Hernandez MD Follow up on 8/22/2022.    Specialties: Vascular Neurology, Neurology  Why: 2:45 PM for hospital follow up  Contact information:  [address]  [place] [address]  [phone]             Narayan Myers MD Follow up on 8/15/2022.    Specialty: Family Medicine  Why: at 8 AM for hospital follow up  Contact information:  [address]  [address]  [place] [address]  [phone]             [facility] Follow up.    Specialties: Home Health Services, Home Therapy Services, Home Living Aide Services  Contact information:  [address]  [facility] [address]  [phone]                     Patient Instructions:      Ambulatory referral/consult to Home Health   Standing Status: Future   Referral Priority: Routine Referral Type: Home Health   Referral Reason: Specialty Services Required   Requested Specialty: Home Health Services   Number of Visits Requested: 1     Ambulatory referral/consult to [facility]   Standing  Status: Future   Referral Priority: Routine Referral Type: Consultation   Referral Reason: Specialty Services Required   Number of Visits Requested: 1     Diet Cardiac     Notify your health care provider if you experience any of the following:  temperature >100.4     Notify your health care provider if you experience any of the following:  persistent nausea and vomiting or diarrhea     Notify your health care provider if you experience any of the following:  severe uncontrolled pain     Notify your health care provider if you experience any of the following:  difficulty breathing or increased cough     Notify your health care provider if you experience any of the following:  severe persistent headache     Notify your health care provider if you experience any of the following:  persistent dizziness, light-headedness, or visual disturbances     Notify your health care provider if you experience any of the following:  increased confusion or weakness     Activity as tolerated       Significant Diagnostic Studies: Labs: All labs within the past 24 hours have been reviewed    Pending Diagnostic Studies:     None         Medications:  Reconciled Home Medications:      Medication List      START taking these medications    mirtazapine 7.5 MG Tab  Commonly known as: REMERON  Take 1 tablet (7.5 mg total) by mouth every evening.        CHANGE how you take these medications    conjugated estrogens vaginal cream  Commonly known as: PREMARIN  Place 0.5 g vaginally once daily AND 0.5 g twice a week.  What changed: See the new instructions.        CONTINUE taking these medications    amLODIPine 2.5 MG tablet  Commonly known as: NORVASC  Take 1 tablet by mouth once daily     baclofen 10 MG tablet  Commonly known as: LIORESAL  Take 1 tablet (10 mg total) by mouth 3 (three) times daily. Start w. Half a tab first 7 days     BLOOD GLUCOSE TEST Strp  Generic drug: blood sugar diagnostic  True Metrix glucose strips check once daily      blood-glucose meter kit  True Metrix glucometer check glucose once daily     CAMELIA-GEST ANTACID 200 mg calcium (500 mg) chewable tablet  Generic drug: calcium carbonate  Chew and swallow 2 tablets (1,000 mg total) by mouth 3 (three) times daily. for 14 days     clotrimazole 10 mg sol  Commonly known as: MYCELEX  Take 1 tablet (10 mg total) by mouth 5 (five) times daily. for 10 days     DULoxetine 30 MG capsule  Commonly known as: CYMBALTA  Take 1 capsule (30 mg total) by mouth once daily.     ergocalciferol 50,000 unit Cap  Commonly known as: ERGOCALCIFEROL  Take 1 capsule (50,000 Units total) by mouth every 30 days.     gabapentin 300 MG capsule  Commonly known as: NEURONTIN  Take 1 capsule (300 mg total) by mouth 2 (two) times daily.     lansoprazole 30 MG capsule  Commonly known as: PREVACID  Take 30 mg by mouth once daily.     levothyroxine 88 MCG tablet  Commonly known as: SYNTHROID  Take 1 tablet (88 mcg total) by mouth before breakfast.     losartan 100 MG tablet  Commonly known as: COZAAR  Take 1 tablet by mouth once daily     nystatin 100,000 unit/mL suspension  Commonly known as: MYCOSTATIN  Take 4 mLs (400,000 Units total) by mouth 4 (four) times daily. for 10 days     pantoprazole 40 MG tablet  Commonly known as: PROTONIX  Take 1 tablet (40 mg total) by mouth once daily.     polyethylene glycol 17 gram Pwpk  Commonly known as: GLYCOLAX  Take 17 g by mouth once daily.     REFRESH OPHT  Apply 1 drop to eye daily as needed.     rosuvastatin 20 MG tablet  Commonly known as: CRESTOR  Take 0.5 tablets (10 mg total) by mouth every evening.     simethicone 125 MG chewable tablet  Commonly known as: MYLICON  Take 125 mg by mouth every 6 (six) hours as needed for Flatulence.     UNABLE TO FIND  I B guard bid        ASK your doctor about these medications    erythromycin ophthalmic ointment  Commonly known as: ROMYCIN  Place into the left eye every 4 (four) hours. Place a 1/2 inch ribbon of ointment into the  lower eyelid. for 5 days  Ask about: Should I take this medication?            Indwelling Lines/Drains at time of discharge:   Lines/Drains/Airways     None                 Time spent on the discharge of patient: 46 minutes         KAREN Madsen  Department of Hospital Medicine  [facility]

## 2022-08-13 NOTE — HPI
Erica Masterson is a 76 year old female with a past medical history of GERD, HTN, HLD, TB s/p therapy, and hypothyroidism who presents with multiple days of spontaneous spasms to the lower extremities. She was recently admitted roughly one week ago for the same symptoms at which time Neurology, Neurosurgery and Psychiatry were all consulted. MRI of the T and L spine, brain as well as MRA brain were all unremarkable at that time. She was scheduled appointments with her PCP as well as Neurology. No changes in her symptoms have occurred during this time.

## 2022-08-13 NOTE — SUBJECTIVE & OBJECTIVE
Past Medical History:   Diagnosis Date    Adenomatous polyp of colon 3/26/2012    Allergy     Anxiety     Cancer     Cataract     Colon polyp     Degenerative arthritis of knee 04/03/2012    Diverticulosis     Encounter for blood transfusion     GERD (gastroesophageal reflux disease)     History of thyroid cancer 2021    Hyperlipidemia     Hypertension     Lateral meniscal tear 5/20/2013    Multinodular goiter 03/26/2012    Myopathy, unspecified 01/18/2010    Tuberculosis        Past Surgical History:   Procedure Laterality Date    BREAST BIOPSY Left 2015    benign    CHOLECYSTECTOMY      COLONOSCOPY  12/2/15    Dr. Britton, multiple polyps, recheck five years-three years if more than 2 polyps are adenomatous    COLONOSCOPY N/A 12/2/2015    COLONOSCOPY N/A 3/20/2019    Procedure: COLONOSCOPY;  Surgeon: Jose Britton MD;  Location: [facility];  Service: Endoscopy;  Laterality: N/A;    COLONOSCOPY N/A 5/20/2020    Dr. Britton; internal hemorrhoids; diverticulosis; polyps removed; repeat in 3 years    CYSTOSCOPY N/A 8/26/2020    Procedure: CYSTOSCOPY;  Surgeon: Charlotte Walters Jr., MD;  Location: [facility] OR;  Service: Urology;  Laterality: N/A;    DISSECTION OF NECK Left 9/13/2021    Procedure: DISSECTION, NECK;  Surgeon: Ryan Torres MD;  Location: [facility] OR [address] [place] [address];  Service: ENT;  Laterality: Left;    ESOPHAGOGASTRODUODENOSCOPY N/A 5/20/2020    Dr. Britton; small hiatal hernia; gastritis; 8 gastric polyps removed    ESOPHAGOGASTRODUODENOSCOPY N/A 4/1/2022    Procedure: EGD (ESOPHAGOGASTRODUODENOSCOPY);  Surgeon: Jose Britton MD;  Location: [facility] ENDO;  Service: Endoscopy;  Laterality: N/A;    FOOT SURGERY      HYSTERECTOMY      TVH/BSO > 20 years    INTRALUMINAL GASTROINTESTINAL TRACT IMAGING VIA CAPSULE N/A 6/4/2020    Procedure: IMAGING PROCEDURE, GI TRACT, INTRALUMINAL, VIA CAPSULE;  Surgeon: Jose Britton MD;  Location: [facility] ENDO;  Service: Endoscopy;  Laterality: N/A;    KNEE SURGERY  06/06/2013     right knee tear Dr Iverson     LAPAROSCOPIC CHOLECYSTECTOMY N/A 9/17/2020    Procedure: CHOLECYSTECTOMY, LAPAROSCOPIC;  Surgeon: Forrest Veronica MD;  Location: [facility];  Service: General;  Laterality: N/A;    LUNG LOBECTOMY  1966    right middle lobectomy, due to Tb    OOPHORECTOMY      SHOULDER SURGERY      francis     THYROIDECTOMY Bilateral 5/19/2021    Procedure: THYROIDECTOMY;  Surgeon: Jamia Amezquita MD;  Location: [facility] OR [address] [place] [address];  Service: General;  Laterality: Bilateral;    THYROIDECTOMY Bilateral 9/13/2021    Procedure: THYROIDECTOMY WITH INTRA-OP PTH;  Surgeon: Ryan Torres MD;  [facility];  Service: ENT;  Laterality: Bilateral;  NIM tube  Intraop PTH       Review of patient's allergies indicates:  No Known Allergies    No current facility-administered medications on file prior to encounter.     Current Outpatient Medications on File Prior to Encounter   Medication Sig    amLODIPine (NORVASC) 2.5 MG tablet Take 1 tablet by mouth once daily    baclofen (LIORESAL) 10 MG tablet Take 1 tablet (10 mg total) by mouth 3 (three) times daily. Start w. Half a tab first 7 days    blood sugar diagnostic (BLOOD GLUCOSE TEST) Strp True Metrix glucose strips check once daily    blood-glucose meter kit True Metrix glucometer check glucose once daily    calcium carbonate (TUMS) 200 mg calcium (500 mg) chewable tablet Chew and swallow 2 tablets (1,000 mg total) by mouth 3 (three) times daily. for 14 days    carboxymethylcellulose sodium (REFRESH OPHT) Apply 1 drop to eye daily as needed.    clotrimazole (MYCELEX) 10 mg sol Take 1 tablet (10 mg total) by mouth 5 (five) times daily. for 10 days    conjugated estrogens (PREMARIN) vaginal cream Place 0.5 g vaginally once daily AND 0.5 g twice a week. (Patient taking differently: Place 0.5 g vaginally once daily AND 0.5 g twice a week. Saturdays and tuesdays)    DULoxetine (CYMBALTA) 30 MG capsule Take 1 capsule (30 mg total) by mouth once daily.     ergocalciferol (ERGOCALCIFEROL) 50,000 unit Cap Take 1 capsule (50,000 Units total) by mouth every 30 days.    gabapentin (NEURONTIN) 300 MG capsule Take 1 capsule (300 mg total) by mouth 2 (two) times daily.    lansoprazole (PREVACID) 30 MG capsule Take 30 mg by mouth once daily.    levothyroxine (SYNTHROID) 88 MCG tablet Take 1 tablet (88 mcg total) by mouth before breakfast.    losartan (COZAAR) 100 MG tablet Take 1 tablet by mouth once daily    mirtazapine (REMERON) 7.5 MG Tab Take 1 tablet (7.5 mg total) by mouth every evening.    nystatin (MYCOSTATIN) 100,000 unit/mL suspension Take 4 mLs (400,000 Units total) by mouth 4 (four) times daily. for 10 days    pantoprazole (PROTONIX) 40 MG tablet Take 1 tablet (40 mg total) by mouth once daily.    polyethylene glycol (GLYCOLAX) 17 gram PwPk Take 17 g by mouth once daily.    rosuvastatin (CRESTOR) 20 MG tablet Take 0.5 tablets (10 mg total) by mouth every evening.    simethicone (MYLICON) 125 MG chewable tablet Take 125 mg by mouth every 6 (six) hours as needed for Flatulence.    UNABLE TO FIND I B guard bid     Family History       Problem Relation (Age of Onset)    Alcohol abuse Son    Alzheimer's disease Maternal Uncle, Sister    Breast cancer Sister (60)    Cancer Mother, Brother, Maternal Grandmother, Brother    Colon cancer Other    Dementia Sister    Diabetes Son, Maternal Aunt    Emphysema Father    Hyperlipidemia Mother    Hypertension Mother, Father, Son    No Known Problems Daughter    Obesity Paternal Uncle    Prostate cancer Other          Tobacco Use    Smoking status: Never Smoker    Smokeless tobacco: Never Used   Substance and Sexual Activity    Alcohol use: Not Currently     Comment: stopped 2019    Drug use: No    Sexual activity: Not Currently     Review of Systems   Neurological:  Positive for tremors.        Spasms.   All other systems reviewed and are negative.  Objective:     Vital Signs (Most Recent):  Temp: 98 °F (36.7 °C) (08/12/22  1311)  Pulse: 76 (08/12/22 1803)  Resp: 15 (08/12/22 1311)  BP: (!) 139/90 (08/12/22 1803)  SpO2: 99 % (08/12/22 1433)   Vital Signs (24h Range):  Temp:  [98 °F (36.7 °C)-98.6 °F (37 °C)] 98 °F (36.7 °C)  Pulse:  [65-78] 76  Resp:  [15-20] 15  SpO2:  [98 %-100 %] 99 %  BP: (139-183)/(76-90) 139/90     Weight: 66.7 kg (147 lb)  Body mass index is 23.73 kg/m².    Physical Exam  Vitals and nursing note reviewed.   Constitutional:       General: She is not in acute distress.     Appearance: Normal appearance. She is not ill-appearing.   HENT:      Head: Normocephalic and atraumatic.      Right Ear: External ear normal.      Left Ear: External ear normal.      Nose: Nose normal.      Mouth/Throat:      Mouth: Mucous membranes are moist.      Pharynx: Oropharynx is clear.   Eyes:      Extraocular Movements: Extraocular movements intact.      Conjunctiva/sclera: Conjunctivae normal.   Cardiovascular:      Rate and Rhythm: Normal rate and regular rhythm.   Pulmonary:      Effort: Pulmonary effort is normal.      Breath sounds: Normal breath sounds.   Abdominal:      General: Bowel sounds are normal. There is no distension.      Palpations: Abdomen is soft.      Tenderness: There is no abdominal tenderness.   Musculoskeletal:         General: No tenderness. Normal range of motion.      Cervical back: Normal range of motion and neck supple.      Right lower leg: No edema.      Left lower leg: No edema.   Skin:     General: Skin is warm and dry.   Neurological:      General: No focal deficit present.      Mental Status: She is alert and oriented to person, place, and time. Mental status is at baseline.      Cranial Nerves: No cranial nerve deficit.      Sensory: No sensory deficit.      Motor: No weakness.      Coordination: Coordination normal.      Gait: Gait normal.      Deep Tendon Reflexes: Reflexes normal.   Psychiatric:         Mood and Affect: Mood normal.         Behavior: Behavior normal.     Significant Labs: All  pertinent labs within the past 24 hours have been reviewed.    Significant Imaging: I have reviewed all pertinent imaging results/findings within the past 24 hours.

## 2022-08-13 NOTE — CONSULTS
[facility] Medicine  Consult Note    Patient Name: Erica Masterson  MRN: [id number]  Admission Date: 8/12/2022  Hospital Length of Stay: 0 days  Attending Physician: Gary Pappas MD  Primary Care Provider: Narayan Myers MD           Patient information was obtained from patient and ER records.     Consults  Subjective:     Principal Problem: <principal problem not specified>    Chief Complaint:   Chief Complaint   Patient presents with    Spasms    Weakness     Patient states that she has been having spasms, nausea, weakness patient states she thinks it is from her medication Pantoprazole and was seen Saturday and was released hasnt taken the medication since but it still having the same symptoms         HPI: Erica Masterson is a 76 year old female with a past medical history of GERD, HTN, HLD, TB s/p therapy, and hypothyroidism who presents with multiple days of spontaneous spasms to the lower extremities. She was recently admitted roughly one week ago for the same symptoms at which time Neurology, Neurosurgery and Psychiatry were all consulted. MRI of the T and L spine, brain as well as MRA brain were all unremarkable at that time. She was scheduled appointments with her PCP as well as Neurology. No changes in her symptoms have occurred during this time.      Past Medical History:   Diagnosis Date    Adenomatous polyp of colon 3/26/2012    Allergy     Anxiety     Cancer     Cataract     Colon polyp     Degenerative arthritis of knee 04/03/2012    Diverticulosis     Encounter for blood transfusion     GERD (gastroesophageal reflux disease)     History of thyroid cancer 2021    Hyperlipidemia     Hypertension     Lateral meniscal tear 5/20/2013    Multinodular goiter 03/26/2012    Myopathy, unspecified 01/18/2010    Tuberculosis        Past Surgical History:   Procedure Laterality Date    BREAST BIOPSY Left 2015    benign    CHOLECYSTECTOMY      COLONOSCOPY  12/2/15     Dr. Britton, multiple polyps, recheck five years-three years if more than 2 polyps are adenomatous    COLONOSCOPY N/A 12/2/2015    COLONOSCOPY N/A 3/20/2019    Procedure: COLONOSCOPY;  Surgeon: Jose Britton MD;  Location: [facility];  Service: Endoscopy;  Laterality: N/A;    COLONOSCOPY N/A 5/20/2020    Dr. Britton; internal hemorrhoids; diverticulosis; polyps removed; repeat in 3 years    CYSTOSCOPY N/A 8/26/2020    Procedure: CYSTOSCOPY;  Surgeon: Charlotte Walters Jr., MD;  [facility]: [facility] OR;  Service: Urology;  Laterality: N/A;    DISSECTION OF NECK Left 9/13/2021    Procedure: DISSECTION, NECK;  Surgeon: Ryan Torres MD;  [facility];  Service: ENT;  Laterality: Left;    ESOPHAGOGASTRODUODENOSCOPY N/A 5/20/2020    Dr. Britton; small hiatal hernia; gastritis; 8 gastric polyps removed    ESOPHAGOGASTRODUODENOSCOPY N/A 4/1/2022    Procedure: EGD (ESOPHAGOGASTRODUODENOSCOPY);  Surgeon: Jose Britton MD;  Location: [facility];  Service: Endoscopy;  Laterality: N/A;    FOOT SURGERY      HYSTERECTOMY      TVH/BSO > 20 years    INTRALUMINAL GASTROINTESTINAL TRACT IMAGING VIA CAPSULE N/A 6/4/2020    Procedure: IMAGING PROCEDURE, GI TRACT, INTRALUMINAL, VIA CAPSULE;  Surgeon: Jose Britton MD;  Location: [facility];  Service: Endoscopy;  Laterality: N/A;    KNEE SURGERY  06/06/2013    right knee tear Dr Iverson     LAPAROSCOPIC CHOLECYSTECTOMY N/A 9/17/2020    Procedure: CHOLECYSTECTOMY, LAPAROSCOPIC;  Surgeon: Forrest Veronica MD;  Location: [facility] OR;  Service: General;  Laterality: N/A;    LUNG LOBECTOMY  1966    right middle lobectomy, due to Tb    OOPHORECTOMY      SHOULDER SURGERY      francis     THYROIDECTOMY Bilateral 5/19/2021    Procedure: THYROIDECTOMY;  Surgeon: Jamia Amezquita MD;  [facility];  Service: General;  Laterality: Bilateral;    THYROIDECTOMY Bilateral 9/13/2021    Procedure: THYROIDECTOMY WITH INTRA-OP PTH;  Surgeon: Ryan Torres MD;   Location: [facility] OR [address] [place] [address];  Service: ENT;  Laterality: Bilateral;  NIM tube  Intraop PTH       Review of patient's allergies indicates:  No Known Allergies    No current facility-administered medications on file prior to encounter.     Current Outpatient Medications on File Prior to Encounter   Medication Sig    amLODIPine (NORVASC) 2.5 MG tablet Take 1 tablet by mouth once daily    baclofen (LIORESAL) 10 MG tablet Take 1 tablet (10 mg total) by mouth 3 (three) times daily. Start w. Half a tab first 7 days    blood sugar diagnostic (BLOOD GLUCOSE TEST) Strp True Metrix glucose strips check once daily    blood-glucose meter kit True Metrix glucometer check glucose once daily    calcium carbonate (TUMS) 200 mg calcium (500 mg) chewable tablet Chew and swallow 2 tablets (1,000 mg total) by mouth 3 (three) times daily. for 14 days    carboxymethylcellulose sodium (REFRESH OPHT) Apply 1 drop to eye daily as needed.    clotrimazole (MYCELEX) 10 mg sol Take 1 tablet (10 mg total) by mouth 5 (five) times daily. for 10 days    conjugated estrogens (PREMARIN) vaginal cream Place 0.5 g vaginally once daily AND 0.5 g twice a week. (Patient taking differently: Place 0.5 g vaginally once daily AND 0.5 g twice a week. Saturdays and tuesdays)    DULoxetine (CYMBALTA) 30 MG capsule Take 1 capsule (30 mg total) by mouth once daily.    ergocalciferol (ERGOCALCIFEROL) 50,000 unit Cap Take 1 capsule (50,000 Units total) by mouth every 30 days.    gabapentin (NEURONTIN) 300 MG capsule Take 1 capsule (300 mg total) by mouth 2 (two) times daily.    lansoprazole (PREVACID) 30 MG capsule Take 30 mg by mouth once daily.    levothyroxine (SYNTHROID) 88 MCG tablet Take 1 tablet (88 mcg total) by mouth before breakfast.    losartan (COZAAR) 100 MG tablet Take 1 tablet by mouth once daily    mirtazapine (REMERON) 7.5 MG Tab Take 1 tablet (7.5 mg total) by mouth every evening.    nystatin (MYCOSTATIN) 100,000 unit/mL suspension  Take 4 mLs (400,000 Units total) by mouth 4 (four) times daily. for 10 days    pantoprazole (PROTONIX) 40 MG tablet Take 1 tablet (40 mg total) by mouth once daily.    polyethylene glycol (GLYCOLAX) 17 gram PwPk Take 17 g by mouth once daily.    rosuvastatin (CRESTOR) 20 MG tablet Take 0.5 tablets (10 mg total) by mouth every evening.    simethicone (MYLICON) 125 MG chewable tablet Take 125 mg by mouth every 6 (six) hours as needed for Flatulence.    UNABLE TO FIND I B guard bid     Family History       Problem Relation (Age of Onset)    Alcohol abuse Son    Alzheimer's disease Maternal Uncle, Sister    Breast cancer Sister (60)    Cancer Mother, Brother, Maternal Grandmother, Brother    Colon cancer Other    Dementia Sister    Diabetes Son, Maternal Aunt    Emphysema Father    Hyperlipidemia Mother    Hypertension Mother, Father, Son    No Known Problems Daughter    Obesity Paternal Uncle    Prostate cancer Other          Tobacco Use    Smoking status: Never Smoker    Smokeless tobacco: Never Used   Substance and Sexual Activity    Alcohol use: Not Currently     Comment: stopped 2019    Drug use: No    Sexual activity: Not Currently     Review of Systems   Neurological:  Positive for tremors.        Spasms.   All other systems reviewed and are negative.  Objective:     Vital Signs (Most Recent):  Temp: 98 °F (36.7 °C) (08/12/22 1311)  Pulse: 76 (08/12/22 1803)  Resp: 15 (08/12/22 1311)  BP: (!) 139/90 (08/12/22 1803)  SpO2: 99 % (08/12/22 1433)   Vital Signs (24h Range):  Temp:  [98 °F (36.7 °C)-98.6 °F (37 °C)] 98 °F (36.7 °C)  Pulse:  [65-78] 76  Resp:  [15-20] 15  SpO2:  [98 %-100 %] 99 %  BP: (139-183)/(76-90) 139/90     Weight: 66.7 kg (147 lb)  Body mass index is 23.73 kg/m².    Physical Exam  Vitals and nursing note reviewed.   Constitutional:       General: She is not in acute distress.     Appearance: Normal appearance. She is not ill-appearing.   HENT:      Head: Normocephalic and atraumatic.       Right Ear: External ear normal.      Left Ear: External ear normal.      Nose: Nose normal.      Mouth/Throat:      Mouth: Mucous membranes are moist.      Pharynx: Oropharynx is clear.   Eyes:      Extraocular Movements: Extraocular movements intact.      Conjunctiva/sclera: Conjunctivae normal.   Cardiovascular:      Rate and Rhythm: Normal rate and regular rhythm.   Pulmonary:      Effort: Pulmonary effort is normal.      Breath sounds: Normal breath sounds.   Abdominal:      General: Bowel sounds are normal. There is no distension.      Palpations: Abdomen is soft.      Tenderness: There is no abdominal tenderness.   Musculoskeletal:         General: No tenderness. Normal range of motion.      Cervical back: Normal range of motion and neck supple.      Right lower leg: No edema.      Left lower leg: No edema.   Skin:     General: Skin is warm and dry.   Neurological:      General: No focal deficit present.      Mental Status: She is alert and oriented to person, place, and time. Mental status is at baseline.      Cranial Nerves: No cranial nerve deficit.      Sensory: No sensory deficit.      Motor: No weakness.      Coordination: Coordination normal.      Gait: Gait normal.      Deep Tendon Reflexes: Reflexes normal.   Psychiatric:         Mood and Affect: Mood normal.         Behavior: Behavior normal.     Significant Labs: All pertinent labs within the past 24 hours have been reviewed.    Significant Imaging: I have reviewed all pertinent imaging results/findings within the past 24 hours.    Assessment/Plan:     Muscle spasms of both lower extremities  Previous imaging performed inpatient unremarkable. Patient counseled to follow up with her PCP and with Neurology for further possible workup and to continue baclofen and gabapentin. No indication for hospitalization at this time. Case discussed with Dr. Young who is in agreement with patient's disposition.        VTE Risk Mitigation (From admission, onward)     None              Thank you for your consult. I will sign off. Please contact us if you have any additional questions.    Gray Pappas MD  Department of Hospital Medicine   [facility] - Emergency Dept

## 2022-08-15 ENCOUNTER — TELEPHONE (OUTPATIENT)
Dept: MEDSURG UNIT | Facility: HOSPITAL | Age: 77
End: 2022-08-15
Payer: MEDICARE

## 2022-08-15 ENCOUNTER — OFFICE VISIT (OUTPATIENT)
Dept: FAMILY MEDICINE | Facility: CLINIC | Age: 77
End: 2022-08-15
Payer: MEDICARE

## 2022-08-15 ENCOUNTER — TELEPHONE (OUTPATIENT)
Dept: FAMILY MEDICINE | Facility: CLINIC | Age: 77
End: 2022-08-15
Payer: MEDICARE

## 2022-08-15 VITALS
DIASTOLIC BLOOD PRESSURE: 66 MMHG | TEMPERATURE: 99 F | RESPIRATION RATE: 14 BRPM | HEIGHT: 66 IN | BODY MASS INDEX: 23.38 KG/M2 | OXYGEN SATURATION: 98 % | HEART RATE: 98 BPM | WEIGHT: 145.5 LBS | SYSTOLIC BLOOD PRESSURE: 124 MMHG

## 2022-08-15 DIAGNOSIS — B37.0 THRUSH: Primary | ICD-10-CM

## 2022-08-15 DIAGNOSIS — E78.5 HYPERLIPIDEMIA ASSOCIATED WITH TYPE 2 DIABETES MELLITUS: ICD-10-CM

## 2022-08-15 DIAGNOSIS — I15.2 HYPERTENSION ASSOCIATED WITH DIABETES: ICD-10-CM

## 2022-08-15 DIAGNOSIS — E11.59 HYPERTENSION ASSOCIATED WITH DIABETES: ICD-10-CM

## 2022-08-15 DIAGNOSIS — E11.69 HYPERLIPIDEMIA ASSOCIATED WITH TYPE 2 DIABETES MELLITUS: ICD-10-CM

## 2022-08-15 DIAGNOSIS — R00.0 TACHYCARDIA: Primary | ICD-10-CM

## 2022-08-15 DIAGNOSIS — M62.838 NIGHT MUSCLE SPASMS: ICD-10-CM

## 2022-08-15 DIAGNOSIS — E11.00 TYPE 2 DIABETES MELLITUS WITH HYPEROSMOLARITY WITHOUT COMA, WITHOUT LONG-TERM CURRENT USE OF INSULIN: ICD-10-CM

## 2022-08-15 PROCEDURE — 1101F PR PT FALLS ASSESS DOC 0-1 FALLS W/OUT INJ PAST YR: ICD-10-PCS | Mod: CPTII,S$GLB,,

## 2022-08-15 PROCEDURE — 3074F PR MOST RECENT SYSTOLIC BLOOD PRESSURE < 130 MM HG: ICD-10-PCS | Mod: CPTII,S$GLB,,

## 2022-08-15 PROCEDURE — 99999 PR PBB SHADOW E&M-EST. PATIENT-LVL V: CPT | Mod: PBBFAC,,,

## 2022-08-15 PROCEDURE — 3074F SYST BP LT 130 MM HG: CPT | Mod: CPTII,S$GLB,,

## 2022-08-15 PROCEDURE — 1160F RVW MEDS BY RX/DR IN RCRD: CPT | Mod: CPTII,S$GLB,,

## 2022-08-15 PROCEDURE — 1160F PR REVIEW ALL MEDS BY PRESCRIBER/CLIN PHARMACIST DOCUMENTED: ICD-10-PCS | Mod: CPTII,S$GLB,,

## 2022-08-15 PROCEDURE — 99214 OFFICE O/P EST MOD 30 MIN: CPT | Mod: S$GLB,,,

## 2022-08-15 PROCEDURE — 99214 PR OFFICE/OUTPT VISIT, EST, LEVL IV, 30-39 MIN: ICD-10-PCS | Mod: S$GLB,,,

## 2022-08-15 PROCEDURE — 99499 UNLISTED E&M SERVICE: CPT | Mod: S$GLB,,,

## 2022-08-15 PROCEDURE — 3078F DIAST BP <80 MM HG: CPT | Mod: CPTII,S$GLB,,

## 2022-08-15 PROCEDURE — 3288F PR FALLS RISK ASSESSMENT DOCUMENTED: ICD-10-PCS | Mod: CPTII,S$GLB,,

## 2022-08-15 PROCEDURE — 1159F MED LIST DOCD IN RCRD: CPT | Mod: CPTII,S$GLB,,

## 2022-08-15 PROCEDURE — 99999 PR PBB SHADOW E&M-EST. PATIENT-LVL V: ICD-10-PCS | Mod: PBBFAC,,,

## 2022-08-15 PROCEDURE — 3288F FALL RISK ASSESSMENT DOCD: CPT | Mod: CPTII,S$GLB,,

## 2022-08-15 PROCEDURE — 1126F AMNT PAIN NOTED NONE PRSNT: CPT | Mod: CPTII,S$GLB,,

## 2022-08-15 PROCEDURE — 3078F PR MOST RECENT DIASTOLIC BLOOD PRESSURE < 80 MM HG: ICD-10-PCS | Mod: CPTII,S$GLB,,

## 2022-08-15 PROCEDURE — 1126F PR PAIN SEVERITY QUANTIFIED, NO PAIN PRESENT: ICD-10-PCS | Mod: CPTII,S$GLB,,

## 2022-08-15 PROCEDURE — 1159F PR MEDICATION LIST DOCUMENTED IN MEDICAL RECORD: ICD-10-PCS | Mod: CPTII,S$GLB,,

## 2022-08-15 PROCEDURE — 1101F PT FALLS ASSESS-DOCD LE1/YR: CPT | Mod: CPTII,S$GLB,,

## 2022-08-15 PROCEDURE — 99499 RISK ADDL DX/OHS AUDIT: ICD-10-PCS | Mod: S$GLB,,,

## 2022-08-15 NOTE — PROGRESS NOTES
Subjective:       Patient ID: Erica Masterson is a 76 y.o. female.    Chief Complaint: Hospital Follow Up (08/06/22-08/09/22 for weakness, spasms and nausea )    Erica Masterson is a 75 yo F pt w/ Mhx listed below that presents to the clinic for hospital and ED follow up. Her presentation and course can be seen below. Today, she states that she is slightly improved since stopping pantoprazole. She discussed with her pharmacist and noted that most of her symptoms match with possible side effects of pantoprazole. Stopped this in recent days and has gradual improvement in her symptoms. Does admit to still experiencing some weakness and inversion of her feet in addition to some dizziness this morning. She is scheduled to see Dr. Mary swanson/ vascular neuro next week. Patient tachy at initial presentation.    HPI:   Erica Masterson is a 77 y/o F with a past medical history significant for HTN, HLD, anxiety, and thyroid cancer s/p thyroidectomy who presented to the ED after finding herself unable to ambulate early this morning.  Reports that she has had intermittent episodes of N/T to her extremities and episodes of difficutly ambulating that usually resolve spontaneously before she can get to the ED to be assessed.  When such episodes have occurred, she reports that she has been diagnosed with anxiety as no other reason has been determined. She has had pain and decreased ROM to the left shoulder which pt states is being treated by orthopedics.  Symptoms seemed to be worse today than usual.  Denies recent fever, chest pain, SOB, dysuria, difficulty with speech or swallowing or any other symptoms. Reports decreasing her synthroid dose a few days ago at the direction of her endocrinologist, but no other medication changes.  ED workup is significant for hypocalcemia.  She is placed in observation under the service of hospital medicine for continued monitoring and treatment.              * No surgery found *       Hospital Course:    Erica Masterson was monitored closely during her hospitalization.  She was given IV calcium and her levels were monitored closely.  She was started on oral calcium supplementation tid.  TSH was noted to be low.  Pt advised that her levothyroxine had been decreased about 4 days prior due to low TSH, so her home dose was continued.  PT/OT were consulted.  Her symptoms persisted despite normal calcium levels and neurology was consulted.  She underwent MRI and MRA of the head without findings to explain her symptoms.  US bilateral carotid arteries was performed with no significant stenosis identified.  MRI lumbar spine identified mild grade 1 anterolisthesis of L4 on L5 and multilevel lumbar spondylosis.  No significant spinal canal stenosis or neural foraminal stenosis, and circumferential disc bulge at L4-L5 contacts the descending bilateral L5 nerve roots.  She was to be discharged home to f/u with neurosurgery outpatient, but she had return of BLE with difficulty with ambulation, so neurosurgery was consulted.  They concluded that findings of MRI L-spine did not explain symptoms and recommended MRI t-spine for thoroughness.  This was performed as well with no acute findings.  Pt again was to be discharged with return of symptoms presenting upon awakening from a nap.  Neurology recommended psychiatric evaluation to r/o conversion disorder.  This was performed and recommendations included begin Remeron 7.5 mg nightly and continue chronic Cymbalta with outpatient follow up.  He concluded that conversion disorder is unlikely. She was cleared for discharge from the standpoint of neurology and neurosurgery.  Follow up appointments were arranged.  Home health was ordered.  Return precautions were discussed at length.  She was discharged home in good condition.     ED visit:       HPI:  68-year-old female presents with spasms of the hand and feet.  She was admitted last week for identical symptoms.  During admission she  was found to have hypocalcemia.  She reports that the symptoms persisted despite treatment.  She denies any weakness or numbness.  She reports transient speech impairment.  She has no headache, visual changes or seizures.  Past medical history is significant for an unspecified myopathy, multinodular goiter, hypertension, hyperlipidemia, anxiety, TB with right middle lobe ectomy     ED Management:  76-year-old female returns with spasms of her hands and feet.  Upon discharge the symptoms have resolved.  She does have mild hypokalemia which may or may not be responsible for her symptoms.  She is given both calcium gluconate and magnesium sulfate.  The symptoms or more likely to reflect a psychiatric underlying cause.  I discussed the case with Hospital Medicine who evaluated the patient and does not feel that she will benefit from inpatient treatment.  She has a neurology appointment to explore other options for ongoing symptoms.    Past Medical History:   Diagnosis Date    Adenomatous polyp of colon 3/26/2012    Allergy     Anxiety     Cancer     Cataract     Colon polyp     Degenerative arthritis of knee 04/03/2012    Diverticulosis     Encounter for blood transfusion     GERD (gastroesophageal reflux disease)     History of thyroid cancer 2021    Hyperlipidemia     Hypertension     Lateral meniscal tear 5/20/2013    Multinodular goiter 03/26/2012    Myopathy, unspecified 01/18/2010    Tuberculosis        Review of patient's allergies indicates:  No Known Allergies      Current Outpatient Medications:     amLODIPine (NORVASC) 2.5 MG tablet, Take 1 tablet by mouth once daily, Disp: 90 tablet, Rfl: 0    baclofen (LIORESAL) 10 MG tablet, Take 1 tablet (10 mg total) by mouth 3 (three) times daily. Start w. Half a tab first 7 days, Disp: 90 tablet, Rfl: 6    blood sugar diagnostic (BLOOD GLUCOSE TEST) Strp, True Metrix glucose strips check once daily, Disp: 100 each, Rfl: 3    carboxymethylcellulose  sodium (REFRESH OPHT), Apply 1 drop to eye daily as needed., Disp: , Rfl:     conjugated estrogens (PREMARIN) vaginal cream, Place 0.5 g vaginally once daily AND 0.5 g twice a week. (Patient taking differently: Place 0.5 g vaginally once daily AND 0.5 g twice a week. Saturdays and tuesdays), Disp: 30 g, Rfl: 7    DULoxetine (CYMBALTA) 30 MG capsule, Take 1 capsule (30 mg total) by mouth once daily., Disp: 30 capsule, Rfl: 11    ergocalciferol (ERGOCALCIFEROL) 50,000 unit Cap, Take 1 capsule (50,000 Units total) by mouth every 30 days., Disp: 3 capsule, Rfl: 3    famotidine (PEPCID) 20 MG tablet, Take 1 tablet (20 mg total) by mouth 2 (two) times daily., Disp: 30 tablet, Rfl: 0    gabapentin (NEURONTIN) 300 MG capsule, Take 1 capsule (300 mg total) by mouth 2 (two) times daily., Disp: 90 capsule, Rfl: 0    lansoprazole (PREVACID) 30 MG capsule, Take 30 mg by mouth once daily., Disp: , Rfl:     levothyroxine (SYNTHROID) 88 MCG tablet, Take 1 tablet (88 mcg total) by mouth before breakfast., Disp: 30 tablet, Rfl: 11    losartan (COZAAR) 100 MG tablet, Take 1 tablet by mouth once daily, Disp: 90 tablet, Rfl: 1    mirtazapine (REMERON) 7.5 MG Tab, Take 1 tablet (7.5 mg total) by mouth every evening., Disp: 90 tablet, Rfl: 0    polyethylene glycol (GLYCOLAX) 17 gram PwPk, Take 17 g by mouth once daily., Disp: 30 each, Rfl: 0    rosuvastatin (CRESTOR) 20 MG tablet, Take 0.5 tablets (10 mg total) by mouth every evening., Disp: 90 tablet, Rfl: 0    simethicone (MYLICON) 125 MG chewable tablet, Take 125 mg by mouth every 6 (six) hours as needed for Flatulence., Disp: , Rfl:     UNABLE TO FIND, I B guard bid, Disp: , Rfl:     blood-glucose meter kit, True Metrix glucometer check glucose once daily, Disp: 1 each, Rfl: 0    calcium carbonate (TUMS) 200 mg calcium (500 mg) chewable tablet, Chew and swallow 2 tablets (1,000 mg total) by mouth 3 (three) times daily. for 14 days, Disp: 100 tablet, Rfl: 0     "pantoprazole (PROTONIX) 40 MG tablet, Take 1 tablet (40 mg total) by mouth once daily., Disp: 30 tablet, Rfl: 2    Review of Systems   Constitutional: Positive for activity change and fatigue. Negative for appetite change, chills, diaphoresis, fever and unexpected weight change.   HENT: Negative for congestion, ear pain, postnasal drip, rhinorrhea, sinus pressure, sneezing, sore throat and trouble swallowing.    Eyes: Negative for pain, itching and visual disturbance.   Respiratory: Negative for cough, chest tightness, shortness of breath and wheezing.    Cardiovascular: Negative for chest pain, palpitations and leg swelling.   Gastrointestinal: Negative for abdominal distention, abdominal pain, blood in stool, constipation, diarrhea, nausea and vomiting.   Endocrine: Negative for cold intolerance and heat intolerance.   Genitourinary: Negative for difficulty urinating, dysuria, frequency, hematuria and urgency.   Musculoskeletal: Negative for arthralgias, back pain, myalgias and neck pain.   Skin: Negative for color change, pallor, rash and wound.   Neurological: Positive for dizziness and weakness. Negative for syncope, numbness and headaches.   Hematological: Negative for adenopathy.   Psychiatric/Behavioral: Negative for behavioral problems. The patient is not nervous/anxious.        Objective:      /66 (BP Location: Right arm, Patient Position: Sitting, BP Method: Medium (Manual))   Pulse (!) 112   Temp 98.6 °F (37 °C) (Oral)   Resp 14   Ht 5' 6" (1.676 m)   Wt 66 kg (145 lb 8.1 oz)   SpO2 98%   BMI 23.48 kg/m²   Physical Exam  Vitals reviewed.   Constitutional:       General: She is not in acute distress.     Appearance: Normal appearance. She is normal weight. She is not ill-appearing, toxic-appearing or diaphoretic.   HENT:      Head: Normocephalic.      Right Ear: External ear normal.      Left Ear: External ear normal.      Nose: Nose normal. No congestion or rhinorrhea.      Mouth/Throat:     "  Mouth: Mucous membranes are moist.      Pharynx: Oropharynx is clear.   Eyes:      General: No scleral icterus.        Right eye: No discharge.         Left eye: No discharge.      Extraocular Movements: Extraocular movements intact.      Conjunctiva/sclera: Conjunctivae normal.   Cardiovascular:      Rate and Rhythm: Normal rate and regular rhythm.      Pulses: Normal pulses.      Heart sounds: Normal heart sounds. No murmur heard.    No friction rub. No gallop.      Comments: During ausculation I do not appreciate tachy.   Pulmonary:      Effort: Pulmonary effort is normal. No respiratory distress.      Breath sounds: Normal breath sounds. No wheezing, rhonchi or rales.   Chest:      Chest wall: No tenderness.   Abdominal:      General: There is no distension.      Palpations: Abdomen is soft. There is no mass.      Tenderness: There is no abdominal tenderness. There is no guarding.   Musculoskeletal:         General: No swelling, tenderness or deformity. Normal range of motion.      Cervical back: Normal range of motion.      Right lower leg: No edema.      Left lower leg: No edema.   Skin:     General: Skin is warm and dry.      Capillary Refill: Capillary refill takes less than 2 seconds.      Coloration: Skin is not jaundiced.      Findings: No bruising, erythema, lesion or rash.   Neurological:      General: No focal deficit present.      Mental Status: She is alert and oriented to person, place, and time. Mental status is at baseline.      Motor: No weakness.   Psychiatric:         Mood and Affect: Mood normal.         Behavior: Behavior normal.         Thought Content: Thought content normal.         Judgment: Judgment normal.         Assessment:       1. Thrush    2. Night muscle spasms    3. Type 2 diabetes mellitus with hyperosmolarity without coma, without long-term current use of insulin    4. Hypertension associated with diabetes    5. Hyperlipidemia associated with type 2 diabetes mellitus, baseline          Plan:       Thrush        -    Continue meds. [employer] lip balm for dry lips.     Night muscle spasms        -    Possible that pantoprazole caused symptoms as side effects. Will continue to monitor now that she has been holding the medication.     Type 2 diabetes mellitus with hyperosmolarity without coma, without long-term current use of insulin          -    Continue meds. Will continue to monitor.        Hypertension associated with diabetes          -    Stable. Continue meds. Will continue to monitor.     Hyperlipidemia associated with type 2 diabetes mellitus, baseline          -   Continue meds. Will continue to monitor.         Pt has close follow up w/ Dr. Myers on Thursday.        Kwesi Cook PA-C  Family Medicine Physician Assistant       I spent a total of 30 minutes on the day of the visit.This includes face to face time and non-face to face time preparing to see the patient (eg, review of tests), obtaining and/or reviewing separately obtained history, documenting clinical information in the electronic or other health record, independently interpreting results and communicating results to the patient/family/caregiver, or care coordinator.      We have addressed [4] Moderate: 1 or more chronic illnesses with exacerbation, progression, or side effects of treatment / 2 or more stable chronic illnesses / 1 undiagnosed new problem with uncertain prognosis / 1 acute illness with systemic symptoms / 1 acute complicated injury  The complexity of the data reviewed and analyzed for this visit was [3] Limited (Reviewed prior external note, ordered unique testing or reviewed the results of each unique test)   The risk of complications and/or morbidity or mortality are [4] Moderate risk (I.e. prescription drug management / decision regarding minor surgery with identified pt or procedure risk factors / decision regarding elective major surgery without identified pt or procedure risk  factors / diagnosis or treatment significantly limited by social determinants of health)   The level of Medical Decision Making for this visit is [4] Moderate

## 2022-08-15 NOTE — PATIENT INSTRUCTIONS
Shanna [employer]'s lip balm for dry lips            Hi Erica,     If you are due for any health screening(s) below please notify me so we can arrange them to be ordered and scheduled to maintain your health. Most healthy patients complete it. Don't lose out on improving your health.       Eye Exam: Met  Blood Pressure <= 139/89: Yes

## 2022-08-15 NOTE — TELEPHONE ENCOUNTER
Please call patient to have labs ordered by Dr. Britton scheduled for two weeks from today. Also, at the patient's convenience can we have her come to clinic for a nurse visit to have an EKG performed. She had a fast heart rate at initial presentation today and I just want to make sure that her EKG still looks good compared to her one in the hospital. Thank you!

## 2022-08-16 ENCOUNTER — TELEPHONE (OUTPATIENT)
Dept: FAMILY MEDICINE | Facility: CLINIC | Age: 77
End: 2022-08-16
Payer: MEDICARE

## 2022-08-16 ENCOUNTER — HOSPITAL ENCOUNTER (OUTPATIENT)
Facility: HOSPITAL | Age: 77
Discharge: HOME | End: 2022-08-17
Attending: EMERGENCY MEDICINE | Admitting: INTERNAL MEDICINE
Payer: MEDICARE

## 2022-08-16 DIAGNOSIS — R07.9 CHEST PAIN, UNSPECIFIED TYPE: Primary | ICD-10-CM

## 2022-08-16 DIAGNOSIS — R07.9 CHEST PAIN: ICD-10-CM

## 2022-08-16 LAB
ALBUMIN SERPL BCP-MCNC: 3.9 G/DL (ref 3.5–5.2)
ALP SERPL-CCNC: 39 U/L (ref 55–135)
ALT SERPL W/O P-5'-P-CCNC: 18 U/L (ref 10–44)
ANION GAP SERPL CALC-SCNC: 9 MMOL/L (ref 8–16)
AST SERPL-CCNC: 18 U/L (ref 10–40)
BASOPHILS # BLD AUTO: 0.04 K/UL (ref 0–0.2)
BASOPHILS NFR BLD: 0.9 % (ref 0–1.9)
BILIRUB SERPL-MCNC: 0.5 MG/DL (ref 0.1–1)
BNP SERPL-MCNC: 19 PG/ML (ref 0–99)
BUN SERPL-MCNC: 17 MG/DL (ref 8–23)
CALCIUM SERPL-MCNC: 8.1 MG/DL (ref 8.7–10.5)
CHLORIDE SERPL-SCNC: 104 MMOL/L (ref 95–110)
CO2 SERPL-SCNC: 24 MMOL/L (ref 23–29)
CREAT SERPL-MCNC: 1 MG/DL (ref 0.5–1.4)
DIFFERENTIAL METHOD: ABNORMAL
EOSINOPHIL # BLD AUTO: 0.1 K/UL (ref 0–0.5)
EOSINOPHIL NFR BLD: 2.7 % (ref 0–8)
ERYTHROCYTE [DISTWIDTH] IN BLOOD BY AUTOMATED COUNT: 13.2 % (ref 11.5–14.5)
EST. GFR  (NO RACE VARIABLE): 57.7 ML/MIN/1.73 M^2
GLUCOSE SERPL-MCNC: 101 MG/DL (ref 70–110)
GLUCOSE SERPL-MCNC: 106 MG/DL (ref 70–110)
GLUCOSE SERPL-MCNC: 113 MG/DL (ref 70–110)
GLUCOSE SERPL-MCNC: 86 MG/DL (ref 70–110)
GLUCOSE SERPL-MCNC: 91 MG/DL (ref 70–110)
HCT VFR BLD AUTO: 35.6 % (ref 37–48.5)
HGB BLD-MCNC: 11.3 G/DL (ref 12–16)
IMM GRANULOCYTES # BLD AUTO: 0.01 K/UL (ref 0–0.04)
IMM GRANULOCYTES NFR BLD AUTO: 0.2 % (ref 0–0.5)
INR PPP: 1
LYMPHOCYTES # BLD AUTO: 1.5 K/UL (ref 1–4.8)
LYMPHOCYTES NFR BLD: 33.2 % (ref 18–48)
MAGNESIUM SERPL-MCNC: 2.1 MG/DL (ref 1.6–2.6)
MCH RBC QN AUTO: 28.4 PG (ref 27–31)
MCHC RBC AUTO-ENTMCNC: 31.7 G/DL (ref 32–36)
MCV RBC AUTO: 89 FL (ref 82–98)
MONOCYTES # BLD AUTO: 0.4 K/UL (ref 0.3–1)
MONOCYTES NFR BLD: 8.2 % (ref 4–15)
NEUTROPHILS # BLD AUTO: 2.5 K/UL (ref 1.8–7.7)
NEUTROPHILS NFR BLD: 54.8 % (ref 38–73)
NRBC BLD-RTO: 0 /100 WBC
PLATELET # BLD AUTO: 233 K/UL (ref 150–450)
PMV BLD AUTO: 8.8 FL (ref 9.2–12.9)
POTASSIUM SERPL-SCNC: 3.6 MMOL/L (ref 3.5–5.1)
PROT SERPL-MCNC: 7.5 G/DL (ref 6–8.4)
PROTHROMBIN TIME: 12.8 SEC (ref 11.4–13.7)
RBC # BLD AUTO: 3.98 M/UL (ref 4–5.4)
SARS-COV-2 RDRP RESP QL NAA+PROBE: NEGATIVE
SODIUM SERPL-SCNC: 137 MMOL/L (ref 136–145)
T4 FREE SERPL-MCNC: 0.8 NG/DL (ref 0.71–1.51)
TROPONIN I SERPL DL<=0.01 NG/ML-MCNC: <0.03 NG/ML
TROPONIN I SERPL DL<=0.01 NG/ML-MCNC: <0.03 NG/ML
TSH SERPL DL<=0.005 MIU/L-ACNC: 0.14 UIU/ML (ref 0.34–5.6)
WBC # BLD AUTO: 4.49 K/UL (ref 3.9–12.7)

## 2022-08-16 PROCEDURE — 99220 PR INITIAL OBSERVATION CARE,LEVL III: ICD-10-PCS | Mod: ,,, | Performed by: INTERNAL MEDICINE

## 2022-08-16 PROCEDURE — 93010 ELECTROCARDIOGRAM REPORT: CPT | Mod: ,,, | Performed by: SPECIALIST

## 2022-08-16 PROCEDURE — 84439 ASSAY OF FREE THYROXINE: CPT | Performed by: EMERGENCY MEDICINE

## 2022-08-16 PROCEDURE — 36415 COLL VENOUS BLD VENIPUNCTURE: CPT | Performed by: EMERGENCY MEDICINE

## 2022-08-16 PROCEDURE — 84484 ASSAY OF TROPONIN QUANT: CPT | Performed by: EMERGENCY MEDICINE

## 2022-08-16 PROCEDURE — 93010 EKG 12-LEAD: ICD-10-PCS | Mod: ,,, | Performed by: SPECIALIST

## 2022-08-16 PROCEDURE — 99285 EMERGENCY DEPT VISIT HI MDM: CPT | Mod: 25

## 2022-08-16 PROCEDURE — 25000003 PHARM REV CODE 250: Performed by: INTERNAL MEDICINE

## 2022-08-16 PROCEDURE — 80053 COMPREHEN METABOLIC PANEL: CPT | Performed by: EMERGENCY MEDICINE

## 2022-08-16 PROCEDURE — G0378 HOSPITAL OBSERVATION PER HR: HCPCS

## 2022-08-16 PROCEDURE — 85025 COMPLETE CBC W/AUTO DIFF WBC: CPT | Performed by: EMERGENCY MEDICINE

## 2022-08-16 PROCEDURE — 83880 ASSAY OF NATRIURETIC PEPTIDE: CPT | Performed by: EMERGENCY MEDICINE

## 2022-08-16 PROCEDURE — 25000003 PHARM REV CODE 250: Performed by: EMERGENCY MEDICINE

## 2022-08-16 PROCEDURE — 85610 PROTHROMBIN TIME: CPT | Performed by: EMERGENCY MEDICINE

## 2022-08-16 PROCEDURE — 36415 COLL VENOUS BLD VENIPUNCTURE: CPT | Performed by: INTERNAL MEDICINE

## 2022-08-16 PROCEDURE — 83735 ASSAY OF MAGNESIUM: CPT | Performed by: EMERGENCY MEDICINE

## 2022-08-16 PROCEDURE — 84484 ASSAY OF TROPONIN QUANT: CPT | Mod: 91 | Performed by: INTERNAL MEDICINE

## 2022-08-16 PROCEDURE — 84443 ASSAY THYROID STIM HORMONE: CPT | Performed by: EMERGENCY MEDICINE

## 2022-08-16 PROCEDURE — 99220 PR INITIAL OBSERVATION CARE,LEVL III: CPT | Mod: ,,, | Performed by: INTERNAL MEDICINE

## 2022-08-16 PROCEDURE — U0002 COVID-19 LAB TEST NON-CDC: HCPCS | Performed by: EMERGENCY MEDICINE

## 2022-08-16 PROCEDURE — 93005 ELECTROCARDIOGRAM TRACING: CPT | Performed by: SPECIALIST

## 2022-08-16 RX ORDER — LEVOTHYROXINE SODIUM 88 UG/1
88 TABLET ORAL
Status: DISCONTINUED | OUTPATIENT
Start: 2022-08-16 | End: 2022-08-17

## 2022-08-16 RX ORDER — CLONAZEPAM 0.5 MG/1
0.5 TABLET ORAL 2 TIMES DAILY
Status: DISCONTINUED | OUTPATIENT
Start: 2022-08-16 | End: 2022-08-17 | Stop reason: HOSPADM

## 2022-08-16 RX ORDER — FAMOTIDINE 20 MG/1
20 TABLET, FILM COATED ORAL 2 TIMES DAILY
Status: DISCONTINUED | OUTPATIENT
Start: 2022-08-16 | End: 2022-08-16

## 2022-08-16 RX ORDER — NYSTATIN 100000 [USP'U]/ML
500000 SUSPENSION ORAL 4 TIMES DAILY
Status: DISCONTINUED | OUTPATIENT
Start: 2022-08-16 | End: 2022-08-17 | Stop reason: HOSPADM

## 2022-08-16 RX ORDER — ATORVASTATIN CALCIUM 40 MG/1
40 TABLET, FILM COATED ORAL DAILY
Status: DISCONTINUED | OUTPATIENT
Start: 2022-08-16 | End: 2022-08-17 | Stop reason: HOSPADM

## 2022-08-16 RX ORDER — LOSARTAN POTASSIUM 50 MG/1
100 TABLET ORAL DAILY
Status: DISCONTINUED | OUTPATIENT
Start: 2022-08-16 | End: 2022-08-17 | Stop reason: HOSPADM

## 2022-08-16 RX ORDER — SODIUM,POTASSIUM PHOSPHATES 280-250MG
2 POWDER IN PACKET (EA) ORAL
Status: DISCONTINUED | OUTPATIENT
Start: 2022-08-16 | End: 2022-08-17 | Stop reason: HOSPADM

## 2022-08-16 RX ORDER — AMLODIPINE BESYLATE 2.5 MG/1
2.5 TABLET ORAL DAILY
Status: DISCONTINUED | OUTPATIENT
Start: 2022-08-16 | End: 2022-08-17 | Stop reason: HOSPADM

## 2022-08-16 RX ORDER — DULOXETIN HYDROCHLORIDE 30 MG/1
30 CAPSULE, DELAYED RELEASE ORAL DAILY
Status: DISCONTINUED | OUTPATIENT
Start: 2022-08-16 | End: 2022-08-17 | Stop reason: HOSPADM

## 2022-08-16 RX ORDER — LANOLIN ALCOHOL/MO/W.PET/CERES
800 CREAM (GRAM) TOPICAL
Status: DISCONTINUED | OUTPATIENT
Start: 2022-08-16 | End: 2022-08-17 | Stop reason: HOSPADM

## 2022-08-16 RX ORDER — ACETAMINOPHEN 325 MG/1
650 TABLET ORAL EVERY 4 HOURS PRN
Status: DISCONTINUED | OUTPATIENT
Start: 2022-08-16 | End: 2022-08-17 | Stop reason: HOSPADM

## 2022-08-16 RX ORDER — CARBOXYMETHYLCELLULOSE SODIUM 5 MG/ML
1 SOLUTION/ DROPS OPHTHALMIC DAILY PRN
Status: DISCONTINUED | OUTPATIENT
Start: 2022-08-16 | End: 2022-08-17 | Stop reason: HOSPADM

## 2022-08-16 RX ORDER — CLONAZEPAM 0.5 MG/1
0.5 TABLET ORAL 2 TIMES DAILY
Status: DISCONTINUED | OUTPATIENT
Start: 2022-08-16 | End: 2022-08-16

## 2022-08-16 RX ORDER — MIRTAZAPINE 7.5 MG/1
7.5 TABLET, FILM COATED ORAL NIGHTLY
Status: DISCONTINUED | OUTPATIENT
Start: 2022-08-16 | End: 2022-08-17 | Stop reason: HOSPADM

## 2022-08-16 RX ORDER — SUCRALFATE 1 G/10ML
1 SUSPENSION ORAL EVERY 6 HOURS
Status: DISCONTINUED | OUTPATIENT
Start: 2022-08-16 | End: 2022-08-17 | Stop reason: HOSPADM

## 2022-08-16 RX ORDER — LANOLIN ALCOHOL/MO/W.PET/CERES
1000 CREAM (GRAM) TOPICAL DAILY
Status: DISCONTINUED | OUTPATIENT
Start: 2022-08-16 | End: 2022-08-17 | Stop reason: HOSPADM

## 2022-08-16 RX ORDER — NAPROXEN SODIUM 220 MG/1
81 TABLET, FILM COATED ORAL ONCE
Status: COMPLETED | OUTPATIENT
Start: 2022-08-16 | End: 2022-08-16

## 2022-08-16 RX ORDER — PANTOPRAZOLE SODIUM 40 MG/10ML
40 INJECTION, POWDER, LYOPHILIZED, FOR SOLUTION INTRAVENOUS DAILY
Status: DISCONTINUED | OUTPATIENT
Start: 2022-08-16 | End: 2022-08-17 | Stop reason: HOSPADM

## 2022-08-16 RX ORDER — SIMETHICONE 80 MG
1 TABLET,CHEWABLE ORAL 3 TIMES DAILY PRN
Status: DISCONTINUED | OUTPATIENT
Start: 2022-08-16 | End: 2022-08-17 | Stop reason: HOSPADM

## 2022-08-16 RX ORDER — CALCIUM CARBONATE 200(500)MG
1000 TABLET,CHEWABLE ORAL 3 TIMES DAILY
Status: DISCONTINUED | OUTPATIENT
Start: 2022-08-16 | End: 2022-08-17 | Stop reason: HOSPADM

## 2022-08-16 RX ORDER — SODIUM CHLORIDE 0.9 % (FLUSH) 0.9 %
2 SYRINGE (ML) INJECTION EVERY 6 HOURS PRN
Status: DISCONTINUED | OUTPATIENT
Start: 2022-08-16 | End: 2022-08-17 | Stop reason: HOSPADM

## 2022-08-16 RX ORDER — NAPROXEN SODIUM 220 MG/1
324 TABLET, FILM COATED ORAL ONCE
Status: DISCONTINUED | OUTPATIENT
Start: 2022-08-16 | End: 2022-08-16

## 2022-08-16 RX ADMIN — CALCIUM CARBONATE (ANTACID) CHEW TAB 500 MG 1000 MG: 500 CHEW TAB at 08:08

## 2022-08-16 RX ADMIN — SUCRALFATE 1 G: 1 SUSPENSION ORAL at 05:08

## 2022-08-16 RX ADMIN — DULOXETINE HYDROCHLORIDE 30 MG: 30 CAPSULE, DELAYED RELEASE ORAL at 09:08

## 2022-08-16 RX ADMIN — NYSTATIN 500000 UNITS: 100000 SUSPENSION ORAL at 08:08

## 2022-08-16 RX ADMIN — CYANOCOBALAMIN TAB 1000 MCG 1000 MCG: 1000 TAB at 09:08

## 2022-08-16 RX ADMIN — CALCIUM CARBONATE (ANTACID) CHEW TAB 500 MG 1000 MG: 500 CHEW TAB at 05:08

## 2022-08-16 RX ADMIN — ATORVASTATIN CALCIUM 40 MG: 40 TABLET, FILM COATED ORAL at 08:08

## 2022-08-16 RX ADMIN — NYSTATIN 500000 UNITS: 100000 SUSPENSION ORAL at 01:08

## 2022-08-16 RX ADMIN — LEVOTHYROXINE SODIUM 88 MCG: 88 TABLET ORAL at 07:08

## 2022-08-16 RX ADMIN — MIRTAZAPINE 7.5 MG: 7.5 TABLET, FILM COATED ORAL at 08:08

## 2022-08-16 RX ADMIN — CALCIUM CARBONATE (ANTACID) CHEW TAB 500 MG 1000 MG: 500 CHEW TAB at 09:08

## 2022-08-16 RX ADMIN — ASPIRIN 81 MG 81 MG: 81 TABLET ORAL at 04:08

## 2022-08-16 RX ADMIN — CLONAZEPAM 0.5 MG: 0.5 TABLET ORAL at 08:08

## 2022-08-16 RX ADMIN — LOSARTAN POTASSIUM 100 MG: 50 TABLET, FILM COATED ORAL at 09:08

## 2022-08-16 RX ADMIN — SUCRALFATE 1 G: 1 SUSPENSION ORAL at 11:08

## 2022-08-16 RX ADMIN — NYSTATIN 500000 UNITS: 100000 SUSPENSION ORAL at 05:08

## 2022-08-16 NOTE — PLAN OF CARE
[facility]  Initial Discharge Assessment       Primary Care Provider: Narayan Myers MD    Admission Diagnosis: Chest pain, unspecified type [R07.9]    Admission Date: 8/16/2022  Expected Discharge Date: TBD    Patient lives with spouse and does not require use of any DME currently. She reports a regular exercise routine and social calendar. She was discharged from a previous admission at [facility] with  services recently, but she does not wish to continue as she is not homebound and she feels she does not need them.    Discharge Barriers Identified: None    Payor: [employer] / Plan: CheckBonus 65 / Product Type: Medicare Advantage /     Extended Emergency Contact Information  Primary Emergency Contact: AnneKristina  Address: [place] [address] [place]  Mobile Phone: [phone]  Relation: Sister  Preferred language: English   needed? No  Secondary Emergency Contact: Joel Masterson  Address: [address] [place] [address] [facility]  Home Phone: [phone]  Mobile Phone: [phone]  Relation: Spouse  Preferred language: English   needed? No    Discharge Plan A: Home with family  Discharge Plan B: Home with family      [id number]  [place] [facility] [address] [facility].  [address] [place], [place] [address] [address]  Phone: [phone] Fax: [phone]      Initial Assessment (most recent)     Adult Discharge Assessment - 08/16/22 1600        Discharge Assessment    Assessment Type Discharge Planning Assessment     Confirmed/corrected address, phone number and insurance Yes     Confirmed Demographics Correct on Facesheet     Source of Information patient     Communicated KIKA with patient/caregiver Date not available/Unable to determine     Reason For Admission Chest pain     Lives With spouse     Facility Arrived From: home     Do you expect to return to your current  living situation? Yes     Do you have help at home or someone to help you manage your care at home? Yes     Who are your caregiver(s) and their phone number(s)? spouse / Joel [phone]     Prior to hospitilization cognitive status: Alert/Oriented     Current cognitive status: Alert/Oriented     Walking or Climbing Stairs Difficulty none     Dressing/Bathing Difficulty none     Equipment Currently Used at Home none     Readmission within 30 days? No   ED and Obs, no inpatient admission    Patient currently being followed by outpatient case management? No     Do you currently have service(s) that help you manage your care at home? Yes     Name and Contact number of agency [facility]     Is the pt/caregiver preference to resume services with current agency No   Patient does not wish to continue HH as she is not homebound    Do you take prescription medications? Yes     Do you have prescription coverage? Yes     Do you have any problems affording any of your prescribed medications? No     Is the patient taking medications as prescribed? yes     Who is going to help you get home at discharge?      How do you get to doctors appointments? car, drives self;family or friend will provide     Are you on dialysis? No     Do you take coumadin? No     Discharge Plan A Home with family     Discharge Plan B Home with family     DME Needed Upon Discharge  none     Discharge Plan discussed with: Patient     Discharge Barriers Identified None

## 2022-08-16 NOTE — H&P
"[facility] - Emergency Dept  Hospital Medicine  History & Physical    Patient Name: Erica Masterson  MRN: [id number]  Patient Class: OP- Observation  Admission Date: 8/16/2022  Attending Physician: Carli Ambrocio MD   Primary Care Provider: Narayan Myers MD         Patient information was obtained from patient, past medical records and ER records.     Subjective:     Principal Problem:Chest pain    Chief Complaint:   Chief Complaint   Patient presents with    Chest Pain     Sharp, lasting around 5 min, resolved.    Fatigue     generalized        HPI:   76 years old female with past medical history hypertension dyslipidemia anxiety thyroid cancer status post thyroidectomy on levothyroxine supplement since then history of gastritis on EGD done in April 2022 she has a nuclear stress test done in March 2022 which was negative for ischemia patient states that the LEs weeks she was switch from pantoprazole to Pepcid she also has a history of GERD peripheral artery disease dyslipidemia on last admission she was admitted due to being unable to ambulate she was evaluated by Neurology and Neurosurgery no acute explanation for it was found she was then evaluated by Psychiatry that started patient on Remeron 7.5 mg at bedtime and kept her Cymbalta she comes to the ER today because she states she will call pain was having a sensation of "pins pinching" her chest, on left side, no radiation no nausea no vomit she does have some shortness of breath she also said that she does have some periumbilical abdominal pain since yesterday it has been on and off no diarrhea denies any blood in stools, no nausea or vomit, once her abdomen ER her 1st set of EKG and troponin is negative, she is going to be admitted.      Past Medical History:   Diagnosis Date    Adenomatous polyp of colon 3/26/2012    Allergy     Anxiety     Cancer     Cataract     Colon polyp     Degenerative arthritis of knee 04/03/2012    " Diverticulosis     Encounter for blood transfusion     GERD (gastroesophageal reflux disease)     History of thyroid cancer 2021    Hyperlipidemia     Hypertension     Lateral meniscal tear 5/20/2013    Multinodular goiter 03/26/2012    Myopathy, unspecified 01/18/2010    Tuberculosis        Past Surgical History:   Procedure Laterality Date    BREAST BIOPSY Left 2015    benign    CHOLECYSTECTOMY      COLONOSCOPY  12/2/15    Dr. Britton, multiple polyps, recheck five years-three years if more than 2 polyps are adenomatous    COLONOSCOPY N/A 12/2/2015    COLONOSCOPY N/A 3/20/2019    Procedure: COLONOSCOPY;  Surgeon: Jose Britton MD;  Location: [facility];  Service: Endoscopy;  Laterality: N/A;    COLONOSCOPY N/A 5/20/2020    Dr. Britton; internal hemorrhoids; diverticulosis; polyps removed; repeat in 3 years    CYSTOSCOPY N/A 8/26/2020    Procedure: CYSTOSCOPY;  Surgeon: Charlotte Walters Jr., MD;  Location: [facility] OR;  Service: Urology;  Laterality: N/A;    DISSECTION OF NECK Left 9/13/2021    Procedure: DISSECTION, NECK;  Surgeon: Ryan Torres MD;  Location: [facility] OR [address] [place] [address];  Service: ENT;  Laterality: Left;    ESOPHAGOGASTRODUODENOSCOPY N/A 5/20/2020    Dr. Britton; small hiatal hernia; gastritis; 8 gastric polyps removed    ESOPHAGOGASTRODUODENOSCOPY N/A 4/1/2022    Procedure: EGD (ESOPHAGOGASTRODUODENOSCOPY);  Surgeon: Jose Britton MD;  Location: [facility];  Service: Endoscopy;  Laterality: N/A;    FOOT SURGERY      HYSTERECTOMY      TVH/BSO > 20 years    INTRALUMINAL GASTROINTESTINAL TRACT IMAGING VIA CAPSULE N/A 6/4/2020    Procedure: IMAGING PROCEDURE, GI TRACT, INTRALUMINAL, VIA CAPSULE;  Surgeon: Jose Britton MD;  Location: [facility];  Service: Endoscopy;  Laterality: N/A;    KNEE SURGERY  06/06/2013    right knee tear Dr Iverson     LAPAROSCOPIC CHOLECYSTECTOMY N/A 9/17/2020    Procedure: CHOLECYSTECTOMY, LAPAROSCOPIC;  Surgeon: Forrest Veronica MD;  Location: [facility]  OR;  Service: General;  Laterality: N/A;    LUNG LOBECTOMY  1966    right middle lobectomy, due to Tb    OOPHORECTOMY      SHOULDER SURGERY      francis     THYROIDECTOMY Bilateral 5/19/2021    Procedure: THYROIDECTOMY;  Surgeon: Jamia Amezquita MD;  Location: [facility] OR [address] [place] [address];  Service: General;  Laterality: Bilateral;    THYROIDECTOMY Bilateral 9/13/2021    Procedure: THYROIDECTOMY WITH INTRA-OP PTH;  Surgeon: Ryan Torres MD;  Location: [facility] OR [address] [place] [address];  Service: ENT;  Laterality: Bilateral;  NIM tube  Intraop PTH       Review of patient's allergies indicates:  No Known Allergies    No current facility-administered medications on file prior to encounter.     Current Outpatient Medications on File Prior to Encounter   Medication Sig    amLODIPine (NORVASC) 2.5 MG tablet Take 1 tablet by mouth once daily    baclofen (LIORESAL) 10 MG tablet Take 1 tablet (10 mg total) by mouth 3 (three) times daily. Start w. Half a tab first 7 days    blood sugar diagnostic (BLOOD GLUCOSE TEST) Strp True Metrix glucose strips check once daily    blood-glucose meter kit True Metrix glucometer check glucose once daily    calcium carbonate (TUMS) 200 mg calcium (500 mg) chewable tablet Chew and swallow 2 tablets (1,000 mg total) by mouth 3 (three) times daily. for 14 days    carboxymethylcellulose sodium (REFRESH OPHT) Apply 1 drop to eye daily as needed.    conjugated estrogens (PREMARIN) vaginal cream Place 0.5 g vaginally once daily AND 0.5 g twice a week. (Patient taking differently: Place 0.5 g vaginally once daily AND 0.5 g twice a week. Saturdays and tuesdays)    DULoxetine (CYMBALTA) 30 MG capsule Take 1 capsule (30 mg total) by mouth once daily.    ergocalciferol (ERGOCALCIFEROL) 50,000 unit Cap Take 1 capsule (50,000 Units total) by mouth every 30 days.    famotidine (PEPCID) 20 MG tablet Take 1 tablet (20 mg total) by mouth 2 (two) times daily.    gabapentin (NEURONTIN) 300 MG capsule Take 1 capsule (300  mg total) by mouth 2 (two) times daily.    lansoprazole (PREVACID) 30 MG capsule Take 30 mg by mouth once daily.    levothyroxine (SYNTHROID) 88 MCG tablet Take 1 tablet (88 mcg total) by mouth before breakfast.    losartan (COZAAR) 100 MG tablet Take 1 tablet by mouth once daily    mirtazapine (REMERON) 7.5 MG Tab Take 1 tablet (7.5 mg total) by mouth every evening.    pantoprazole (PROTONIX) 40 MG tablet Take 1 tablet (40 mg total) by mouth once daily.    polyethylene glycol (GLYCOLAX) 17 gram PwPk Take 17 g by mouth once daily.    rosuvastatin (CRESTOR) 20 MG tablet Take 0.5 tablets (10 mg total) by mouth every evening.    simethicone (MYLICON) 125 MG chewable tablet Take 125 mg by mouth every 6 (six) hours as needed for Flatulence.    UNABLE TO FIND I B guard bid     Family History       Problem Relation (Age of Onset)    Alcohol abuse Son    Alzheimer's disease Maternal Uncle, Sister    Breast cancer Sister (60)    Cancer Mother, Brother, Maternal Grandmother, Brother    Colon cancer Other    Dementia Sister    Diabetes Son, Maternal Aunt    Emphysema Father    Hyperlipidemia Mother    Hypertension Mother, Father, Son    No Known Problems Daughter    Obesity Paternal Uncle    Prostate cancer Other          Tobacco Use    Smoking status: Never Smoker    Smokeless tobacco: Never Used   Substance and Sexual Activity    Alcohol use: Not Currently     Comment: stopped 2019    Drug use: No    Sexual activity: Not Currently     Review of Systems   Reason unable to perform ROS: [employer] systems review pertinent positives and negatives the present on HPI.   Objective:     Vital Signs (Most Recent):  Temp: 98 °F (36.7 °C) (08/16/22 0321)  Pulse: 66 (08/16/22 0500)  Resp: 17 (08/16/22 0500)  BP: 135/64 (08/16/22 0500)  SpO2: 99 % (08/16/22 0500) Vital Signs (24h Range):  Temp:  [98 °F (36.7 °C)-98.6 °F (37 °C)] 98 °F (36.7 °C)  Pulse:  [] 66  Resp:  [14-17] 17  SpO2:  [98 %-100 %] 99 %  BP:  (124-185)/(63-81) 135/64     Weight: 67.6 kg (149 lb)  Body mass index is 24.05 kg/m².    Physical Exam  Constitutional:       General: She is not in acute distress.     Appearance: She is not ill-appearing, toxic-appearing or diaphoretic.   HENT:      Head: Normocephalic and atraumatic.      Right Ear: External ear normal.      Left Ear: External ear normal.      Nose: Nose normal.   Cardiovascular:      Rate and Rhythm: Normal rate and regular rhythm.      Pulses: Normal pulses.      Heart sounds: Normal heart sounds. No murmur heard.    No friction rub. No gallop.   Pulmonary:      Effort: Pulmonary effort is normal. No respiratory distress.      Breath sounds: Normal breath sounds. No stridor. No wheezing, rhonchi or rales.   Abdominal:      General: Abdomen is flat. Bowel sounds are normal. There is no distension.      Palpations: Abdomen is soft.      Tenderness: There is no abdominal tenderness. There is no guarding or rebound.      Hernia: No hernia is present.   Musculoskeletal:         General: No swelling, tenderness or deformity.      Cervical back: Neck supple.      Right lower leg: No edema.      Left lower leg: No edema.   Neurological:      General: No focal deficit present.      Mental Status: She is alert.           Significant Labs: All pertinent labs within the past 24 hours have been reviewed.  A1C:   Recent Labs   Lab 04/06/22  0751 08/08/22  0421   HGBA1C 5.8* 6.0*     CBC:   Recent Labs   Lab 08/16/22  0349   WBC 4.49   HGB 11.3*   HCT 35.6*        CMP:   Recent Labs   Lab 08/16/22  0349      K 3.6      CO2 24      BUN 17   CREATININE 1.0   CALCIUM 8.1*   PROT 7.5   ALBUMIN 3.9   BILITOT 0.5   ALKPHOS 39*   AST 18   ALT 18   ANIONGAP 9     Lipid Panel: No results for input(s): CHOL, HDL, LDLCALC, TRIG, CHOLHDL in the last 48 hours.  Magnesium: No results for input(s): MG in the last 48 hours.  Troponin:   Recent Labs   Lab 08/16/22  0349   TROPONINI <0.030     TSH:    Recent Labs   Lab 08/06/22  0503   TSH <0.010*     Urine Culture: No results for input(s): LABURIN in the last 48 hours.  Urine Studies: No results for input(s): COLORU, APPEARANCEUA, PHUR, SPECGRAV, PROTEINUA, GLUCUA, KETONESU, BILIRUBINUA, OCCULTUA, NITRITE, UROBILINOGEN, LEUKOCYTESUR, RBCUA, WBCUA, BACTERIA, SQUAMEPITHEL, HYALINECASTS in the last 48 hours.    Invalid input(s): BLAIR    Significant Imaging: I have reviewed all pertinent imaging results/findings within the past 24 hours.    Assessment/Plan:     * Chest pain    Trend troponin 6 hours  Telemetry monitoringContinue with home aspirin 81 mg a day  Continue with home medications of Remeron 7.5 mg at bedtime Cymbalta we will put patient back on pantoprazole 40 mg a day since she was switched last week vitamin B12 1000 mcg a day magnesium level now continue with home medications as appropriate      VTE Risk Mitigation (From admission, onward)         Ordered     IP VTE HIGH RISK PATIENT  Once         08/16/22 0555     Place sequential compression device  Until discontinued         08/16/22 0550                   Timmy Ramos MD  Department of Hospital Medicine   Count includes the [facility] - Emergency Dept

## 2022-08-16 NOTE — HPI
"  76 years old female with past medical history hypertension dyslipidemia anxiety thyroid cancer status post thyroidectomy on levothyroxine supplement since then history of gastritis on EGD done in April 2022 she has a nuclear stress test done in March 2022 which was negative for ischemia patient states that the LEs weeks she was switch from pantoprazole to Pepcid she also has a history of GERD peripheral artery disease dyslipidemia on last admission she was admitted due to being unable to ambulate she was evaluated by Neurology and Neurosurgery no acute explanation for it was found she was then evaluated by Psychiatry that started patient on Remeron 7.5 mg at bedtime and kept her Cymbalta she comes to the ER today because she states she will call pain was having a sensation of "pins pinching" her chest, on left side, no radiation no nausea no vomit she does have some shortness of breath she also said that she does have some periumbilical abdominal pain since yesterday it has been on and off no diarrhea denies any blood in stools, no nausea or vomit, once her abdomen ER her 1st set of EKG and troponin is negative, she is going to be admitted.  "

## 2022-08-16 NOTE — ASSESSMENT & PLAN NOTE
Trend troponin 6 hours  Telemetry monitoringContinue with home aspirin 81 mg a day  Continue with home medications of Remeron 7.5 mg at bedtime Cymbalta we will put patient back on pantoprazole 40 mg a day since she was switched last week vitamin B12 1000 mcg a day magnesium level now continue with home medications as appropriate    Case reviewed by Cardiology and they feel that the pain is of GI origin  Adding clonazepam 0.5 mg po BID and carafate liquid 1 gram QID

## 2022-08-16 NOTE — PLAN OF CARE
Problem: Adult Inpatient Plan of Care  Goal: Plan of Care Review  Outcome: Ongoing, Progressing  Goal: Patient-Specific Goal (Individualized)  Outcome: Ongoing, Progressing  Goal: Absence of Hospital-Acquired Illness or Injury  Outcome: Ongoing, Progressing  Goal: Optimal Comfort and Wellbeing  Outcome: Ongoing, Progressing  Goal: Readiness for Transition of Care  Outcome: Ongoing, Progressing     Problem: Chest Pain  Goal: Resolution of Chest Pain Symptoms  Outcome: Ongoing, Progressing   Pt will remain free of chest pain

## 2022-08-16 NOTE — HOSPITAL COURSE
8/16/2022  Ms Masterson complains of GERD and thrush. She is very anxious and continues to have the same problems    8/17/2022  Ms Masterson has no chest pain or gerd symptoms  She is scrubbing the meibomian gland cyst of the left eye 2 times per day and if it does not resolve will seek ocular care  Pt notes that her anxiety is decreased but that she sometimes feels overwhelmed  ROS: see above otherwise negative X 6  PE in no distress HEENT TAYLOR EOM intact moist mucus membranes Neck supple no use of accessory muscles Lungs no crackles wheezes or rhonchi Heart S 1 S 2 RRR no murmur Abdomen BS+ nontender Ext without CC or E pulses 1-2+ Skin no acute finding Neuro anxious no acute sensory or motor deficit

## 2022-08-16 NOTE — TELEPHONE ENCOUNTER
Noted. EKG canceled   ----- Message from Mack Guerra sent at 8/16/2022  9:15 AM CDT -----  Contact: Self  Type: Needs Medical Advice  Who Called:  Patient  Best Call Back Number: [phone]  Additional Information:  Was admitted into [facility]. Wanted to cancel 8/16 nurse visit, make office aware EKG was done while admitted.

## 2022-08-16 NOTE — SUBJECTIVE & OBJECTIVE
Past Medical History:   Diagnosis Date    Adenomatous polyp of colon 3/26/2012    Allergy     Anxiety     Cancer     Cataract     Colon polyp     Degenerative arthritis of knee 04/03/2012    Diverticulosis     Encounter for blood transfusion     GERD (gastroesophageal reflux disease)     History of thyroid cancer 2021    Hyperlipidemia     Hypertension     Lateral meniscal tear 5/20/2013    Multinodular goiter 03/26/2012    Myopathy, unspecified 01/18/2010    Tuberculosis        Past Surgical History:   Procedure Laterality Date    BREAST BIOPSY Left 2015    benign    CHOLECYSTECTOMY      COLONOSCOPY  12/2/15    Dr. Britton, multiple polyps, recheck five years-three years if more than 2 polyps are adenomatous    COLONOSCOPY N/A 12/2/2015    COLONOSCOPY N/A 3/20/2019    Procedure: COLONOSCOPY;  Surgeon: Jose Britton MD;  Location: [facility];  Service: Endoscopy;  Laterality: N/A;    COLONOSCOPY N/A 5/20/2020    Dr. Britton; internal hemorrhoids; diverticulosis; polyps removed; repeat in 3 years    CYSTOSCOPY N/A 8/26/2020    Procedure: CYSTOSCOPY;  Surgeon: Charlotte Walters Jr., MD;  Location: [facility] OR;  Service: Urology;  Laterality: N/A;    DISSECTION OF NECK Left 9/13/2021    Procedure: DISSECTION, NECK;  Surgeon: Ryan Torres MD;  Location: [facility] OR [address] [place] [address];  Service: ENT;  Laterality: Left;    ESOPHAGOGASTRODUODENOSCOPY N/A 5/20/2020    Dr. Britton; small hiatal hernia; gastritis; 8 gastric polyps removed    ESOPHAGOGASTRODUODENOSCOPY N/A 4/1/2022    Procedure: EGD (ESOPHAGOGASTRODUODENOSCOPY);  Surgeon: Jose Britton MD;  Location: [facility] ENDO;  Service: Endoscopy;  Laterality: N/A;    FOOT SURGERY      HYSTERECTOMY      TVH/BSO > 20 years    INTRALUMINAL GASTROINTESTINAL TRACT IMAGING VIA CAPSULE N/A 6/4/2020    Procedure: IMAGING PROCEDURE, GI TRACT, INTRALUMINAL, VIA CAPSULE;  Surgeon: Jose Britton MD;  Location: [facility] ENDO;  Service: Endoscopy;  Laterality: N/A;    KNEE SURGERY  06/06/2013     right knee tear Dr Iverson     LAPAROSCOPIC CHOLECYSTECTOMY N/A 9/17/2020    Procedure: CHOLECYSTECTOMY, LAPAROSCOPIC;  Surgeon: Forrest Veronica MD;  Location: [facility];  Service: General;  Laterality: N/A;    LUNG LOBECTOMY  1966    right middle lobectomy, due to Tb    OOPHORECTOMY      SHOULDER SURGERY      francis     THYROIDECTOMY Bilateral 5/19/2021    Procedure: THYROIDECTOMY;  Surgeon: Jamia Amezquita MD;  Location: [facility] OR [address] [place] [address];  Service: General;  Laterality: Bilateral;    THYROIDECTOMY Bilateral 9/13/2021    Procedure: THYROIDECTOMY WITH INTRA-OP PTH;  Surgeon: Ryan Torres MD;  [facility];  Service: ENT;  Laterality: Bilateral;  NIM tube  Intraop PTH       Review of patient's allergies indicates:  No Known Allergies    No current facility-administered medications on file prior to encounter.     Current Outpatient Medications on File Prior to Encounter   Medication Sig    amLODIPine (NORVASC) 2.5 MG tablet Take 1 tablet by mouth once daily    baclofen (LIORESAL) 10 MG tablet Take 1 tablet (10 mg total) by mouth 3 (three) times daily. Start w. Half a tab first 7 days    blood sugar diagnostic (BLOOD GLUCOSE TEST) Strp True Metrix glucose strips check once daily    blood-glucose meter kit True Metrix glucometer check glucose once daily    calcium carbonate (TUMS) 200 mg calcium (500 mg) chewable tablet Chew and swallow 2 tablets (1,000 mg total) by mouth 3 (three) times daily. for 14 days    carboxymethylcellulose sodium (REFRESH OPHT) Apply 1 drop to eye daily as needed.    conjugated estrogens (PREMARIN) vaginal cream Place 0.5 g vaginally once daily AND 0.5 g twice a week. (Patient taking differently: Place 0.5 g vaginally once daily AND 0.5 g twice a week. Saturdays and tuesdays)    DULoxetine (CYMBALTA) 30 MG capsule Take 1 capsule (30 mg total) by mouth once daily.    ergocalciferol (ERGOCALCIFEROL) 50,000 unit Cap Take 1 capsule (50,000 Units total) by mouth every 30 days.     famotidine (PEPCID) 20 MG tablet Take 1 tablet (20 mg total) by mouth 2 (two) times daily.    gabapentin (NEURONTIN) 300 MG capsule Take 1 capsule (300 mg total) by mouth 2 (two) times daily.    lansoprazole (PREVACID) 30 MG capsule Take 30 mg by mouth once daily.    levothyroxine (SYNTHROID) 88 MCG tablet Take 1 tablet (88 mcg total) by mouth before breakfast.    losartan (COZAAR) 100 MG tablet Take 1 tablet by mouth once daily    mirtazapine (REMERON) 7.5 MG Tab Take 1 tablet (7.5 mg total) by mouth every evening.    pantoprazole (PROTONIX) 40 MG tablet Take 1 tablet (40 mg total) by mouth once daily.    polyethylene glycol (GLYCOLAX) 17 gram PwPk Take 17 g by mouth once daily.    rosuvastatin (CRESTOR) 20 MG tablet Take 0.5 tablets (10 mg total) by mouth every evening.    simethicone (MYLICON) 125 MG chewable tablet Take 125 mg by mouth every 6 (six) hours as needed for Flatulence.    UNABLE TO FIND I B guard bid     Family History       Problem Relation (Age of Onset)    Alcohol abuse Son    Alzheimer's disease Maternal Uncle, Sister    Breast cancer Sister (60)    Cancer Mother, Brother, Maternal Grandmother, Brother    Colon cancer Other    Dementia Sister    Diabetes Son, Maternal Aunt    Emphysema Father    Hyperlipidemia Mother    Hypertension Mother, Father, Son    No Known Problems Daughter    Obesity Paternal Uncle    Prostate cancer Other          Tobacco Use    Smoking status: Never Smoker    Smokeless tobacco: Never Used   Substance and Sexual Activity    Alcohol use: Not Currently     Comment: stopped 2019    Drug use: No    Sexual activity: Not Currently     Review of Systems   Reason unable to perform ROS: [employer] systems review pertinent positives and negatives the present on HPI.   Objective:     Vital Signs (Most Recent):  Temp: 98 °F (36.7 °C) (08/16/22 0321)  Pulse: 66 (08/16/22 0500)  Resp: 17 (08/16/22 0500)  BP: 135/64 (08/16/22 0500)  SpO2: 99 % (08/16/22 0500) Vital Signs (24h  Range):  Temp:  [98 °F (36.7 °C)-98.6 °F (37 °C)] 98 °F (36.7 °C)  Pulse:  [] 66  Resp:  [14-17] 17  SpO2:  [98 %-100 %] 99 %  BP: (124-185)/(63-81) 135/64     Weight: 67.6 kg (149 lb)  Body mass index is 24.05 kg/m².    Physical Exam  Constitutional:       General: She is not in acute distress.     Appearance: She is not ill-appearing, toxic-appearing or diaphoretic.   HENT:      Head: Normocephalic and atraumatic.      Right Ear: External ear normal.      Left Ear: External ear normal.      Nose: Nose normal.   Cardiovascular:      Rate and Rhythm: Normal rate and regular rhythm.      Pulses: Normal pulses.      Heart sounds: Normal heart sounds. No murmur heard.    No friction rub. No gallop.   Pulmonary:      Effort: Pulmonary effort is normal. No respiratory distress.      Breath sounds: Normal breath sounds. No stridor. No wheezing, rhonchi or rales.   Abdominal:      General: Abdomen is flat. Bowel sounds are normal. There is no distension.      Palpations: Abdomen is soft.      Tenderness: There is no abdominal tenderness. There is no guarding or rebound.      Hernia: No hernia is present.   Musculoskeletal:         General: No swelling, tenderness or deformity.      Cervical back: Neck supple.      Right lower leg: No edema.      Left lower leg: No edema.   Neurological:      General: No focal deficit present.      Mental Status: She is alert.           Significant Labs: All pertinent labs within the past 24 hours have been reviewed.  A1C:   Recent Labs   Lab 04/06/22  0751 08/08/22  0421   HGBA1C 5.8* 6.0*     CBC:   Recent Labs   Lab 08/16/22  0349   WBC 4.49   HGB 11.3*   HCT 35.6*        CMP:   Recent Labs   Lab 08/16/22  0349      K 3.6      CO2 24      BUN 17   CREATININE 1.0   CALCIUM 8.1*   PROT 7.5   ALBUMIN 3.9   BILITOT 0.5   ALKPHOS 39*   AST 18   ALT 18   ANIONGAP 9     Lipid Panel: No results for input(s): CHOL, HDL, LDLCALC, TRIG, CHOLHDL in the last 48  hours.  Magnesium: No results for input(s): MG in the last 48 hours.  Troponin:   Recent Labs   Lab 08/16/22  0349   TROPONINI <0.030     TSH:   Recent Labs   Lab 08/06/22  0503   TSH <0.010*     Urine Culture: No results for input(s): LABURIN in the last 48 hours.  Urine Studies: No results for input(s): COLORU, APPEARANCEUA, PHUR, SPECGRAV, PROTEINUA, GLUCUA, KETONESU, BILIRUBINUA, OCCULTUA, NITRITE, UROBILINOGEN, LEUKOCYTESUR, RBCUA, WBCUA, BACTERIA, SQUAMEPITHEL, HYALINECASTS in the last 48 hours.    Invalid input(s): BLAIR    Significant Imaging: I have reviewed all pertinent imaging results/findings within the past 24 hours.

## 2022-08-16 NOTE — PROGRESS NOTES
"Formerly [facility] Medicine  Progress Note    Patient Name: Erica Masterson  MRN: [id number]  Patient Class: OP- Observation   Admission Date: 8/16/2022  Length of Stay: 0 days  Attending Physician: Juan M Solis MD  Primary Care Provider: Narayan Myers MD        Subjective:     Principal Problem:Chest pain        HPI:    76 years old female with past medical history hypertension dyslipidemia anxiety thyroid cancer status post thyroidectomy on levothyroxine supplement since then history of gastritis on EGD done in April 2022 she has a nuclear stress test done in March 2022 which was negative for ischemia patient states that the LEs weeks she was switch from pantoprazole to Pepcid she also has a history of GERD peripheral artery disease dyslipidemia on last admission she was admitted due to being unable to ambulate she was evaluated by Neurology and Neurosurgery no acute explanation for it was found she was then evaluated by Psychiatry that started patient on Remeron 7.5 mg at bedtime and kept her Cymbalta she comes to the ER today because she states she will call pain was having a sensation of "pins pinching" her chest, on left side, no radiation no nausea no vomit she does have some shortness of breath she also said that she does have some periumbilical abdominal pain since yesterday it has been on and off no diarrhea denies any blood in stools, no nausea or vomit, once her abdomen ER her 1st set of EKG and troponin is negative, she is going to be admitted.      Overview/Hospital Course:  8/16/2022  Ms Masterson complains of GERD and thrush. She is very anxious and continues to have the same problems      Interval History: Ms Masterson has recurrent chest and GI symptoms    Review of Systems   Constitutional:  Positive for activity change, diaphoresis and fatigue.   HENT: Negative.     Eyes: Negative.    Respiratory:  Positive for chest tightness.         RASMUSSEN   Cardiovascular:  Positive for chest " pain and palpitations.   Gastrointestinal: Negative.    Endocrine: Positive for heat intolerance.   Genitourinary: Negative.    Musculoskeletal:  Positive for arthralgias and myalgias.   Skin: Negative.    Allergic/Immunologic: Negative.    Neurological:  Positive for weakness.   Hematological:  Bruises/bleeds easily.   Psychiatric/Behavioral:  Positive for dysphoric mood. The patient is nervous/anxious.    Objective:     Vital Signs (Most Recent):  Temp: 99 °F (37.2 °C) (08/16/22 1212)  Pulse: 78 (08/16/22 1212)  Resp: 18 (08/16/22 1212)  BP: (!) 142/67 (08/16/22 1212)  SpO2: 95 % (08/16/22 1212)   Vital Signs (24h Range):  Temp:  [97.7 °F (36.5 °C)-99 °F (37.2 °C)] 99 °F (37.2 °C)  Pulse:  [61-78] 78  Resp:  [14-19] 18  SpO2:  [95 %-100 %] 95 %  BP: (135-185)/(63-81) 142/67     Weight: 73.4 kg (161 lb 13.1 oz)  Body mass index is 26.12 kg/m².  No intake or output data in the 24 hours ending 08/16/22 1501   Physical Exam  Vitals and nursing note reviewed.   Constitutional:       General: She is not in acute distress.  HENT:      Head: Normocephalic and atraumatic.      Nose: Nose normal.      Mouth/Throat:      Mouth: Mucous membranes are moist.   Eyes:      Extraocular Movements: Extraocular movements intact.      Pupils: Pupils are equal, round, and reactive to light.   Cardiovascular:      Rate and Rhythm: Normal rate and regular rhythm.      Heart sounds:     Gallop present.   Pulmonary:      Effort: Pulmonary effort is normal.      Breath sounds: Normal breath sounds.   Abdominal:      General: There is distension.      Tenderness: There is no abdominal tenderness. There is no guarding or rebound.   Musculoskeletal:         General: Normal range of motion.      Cervical back: Normal range of motion and neck supple.   Skin:     General: Skin is warm.   Neurological:      Mental Status: She is alert and oriented to person, place, and time.   Psychiatric:      Comments: Dysphoric mood       Significant Labs: All  pertinent labs within the past 24 hours have been reviewed.  Recent Lab Results  (Last 5 results in the past 24 hours)        08/16/22  1138   08/16/22  0846   08/16/22  0751   08/16/22  0515   08/16/22  0349        Albumin         3.9       Alkaline Phosphatase         39       ALT         18       Anion Gap         9       AST         18       Baso #         0.04       Basophil %         0.9       BILIRUBIN TOTAL         0.5  Comment: For infants and newborns, interpretation of results should be based  on gestational age, weight and in agreement with clinical  observations.    Premature Infant recommended reference ranges:  Up to 24 hours.............<8.0 mg/dL  Up to 48 hours............<12.0 mg/dL  3-5 days..................<15.0 mg/dL  6-29 days.................<15.0 mg/dL         BNP         19  Comment: Values of less than 100 pg/ml are consistent with non-CHF populations.       BUN         17       Calcium         8.1       Chloride         104       CO2         24       Creatinine         1.0       Differential Method         Automated       eGFR         57.7       Eos #         0.1       Eosinophil %         2.7       Free T4         0.80       Glucose         106       Gran # (ANC)         2.5       Gran %         54.8       Hematocrit         35.6       Hemoglobin         11.3       Immature Grans (Abs)         0.01  Comment: Mild elevation in immature granulocytes is non specific and   can be seen in a variety of conditions including stress response,   acute inflammation, trauma and pregnancy. Correlation with other   laboratory and clinical findings is essential.         Immature Granulocytes         0.2       INR         1.0  Comment: Coumadin Therapy:  INR: 2.0-3.0 conventional anticoagulation    INR: 2.5-3.5 intensive anticoagulation         Lymph #         1.5       Lymph %         33.2       Magnesium         2.1       MCH         28.4       MCHC         31.7       MCV         89       Mono #          0.4       Mono %         8.2       MPV         8.8       nRBC         0       Platelets         233       POC Glucose 91     86           Potassium         3.6       PROTEIN TOTAL         7.5       PT         12.8       RBC         3.98       RDW         13.2       SARS-CoV-2 RNA, Amplification, Qual       Negative  Comment: This test utilizes isothermal nucleic acid amplification   technology to detect the SARS-CoV-2 RdRp nucleic acid segment.   The analytical sensitivity (limit of detection) is 125 genome   equivalents/mL.     A POSITIVE result implies infection with the SARS-CoV-2 virus;  the patient is presumed to be contagious.    A NEGATIVE result means that SARS-CoV-2 nucleic acids are not  present above the limit of detection. A NEGATIVE result should be   treated as presumptive. It does not rule out the possibility of   COVID-19 and should not be the sole basis for treatment decisions.   If COVID-19 is strongly suspected based on clinical and exposure   history, re-testing using an alternate molecular assay should be   considered.       This test is only for use under the Food and Drug   Administration s Emergency Use Authorization (EUA).   Commercial kits are provided by [employer].   Performance characteristics of the EUA have been independently  verified by [facility] Department of  Pathology and Laboratory Medicine.   _________________________________________________________________  The ID NOW COVID-19 Letter of Authorization, along with the   authorized Fact Sheet for Healthcare Providers, the authorized Fact  Sheet for Patients, and authorized labeling are available on the FDA   website:  www.fda.gov/MedicalDevices/Safety/EmergencySituations/ofn880772.htm           Sodium         137       Troponin I   <0.030       <0.030       TSH         0.140       WBC         4.49                              Significant Imaging: I have reviewed all pertinent imaging results/findings within  the past 24 hours.      Assessment/Plan:      * Chest pain    Trend troponin 6 hours  Telemetry monitoringContinue with home aspirin 81 mg a day  Continue with home medications of Remeron 7.5 mg at bedtime Cymbalta we will put patient back on pantoprazole 40 mg a day since she was switched last week vitamin B12 1000 mcg a day magnesium level now continue with home medications as appropriate    Case reviewed by Cardiology and they feel that the pain is of GI origin  Adding clonazepam 0.5 mg po BID and carafate liquid 1 gram QID      VTE Risk Mitigation (From admission, onward)         Ordered     IP VTE HIGH RISK PATIENT  Once         08/16/22 0555     Place sequential compression device  Until discontinued         08/16/22 0555                Discharge Planning   KIKA:      Code Status: Full Code   Is the patient medically ready for discharge?:     Reason for patient still in hospital (select all that apply): Treatment, Consult recommendations and Pending disposition                     Juan M Solis MD  Department of Hospital Medicine   [facility]

## 2022-08-16 NOTE — SUBJECTIVE & OBJECTIVE
Interval History: Ms Masterson has recurrent chest and GI symptoms    Review of Systems   Constitutional:  Positive for activity change, diaphoresis and fatigue.   HENT: Negative.     Eyes: Negative.    Respiratory:  Positive for chest tightness.         RASMUSSEN   Cardiovascular:  Positive for chest pain and palpitations.   Gastrointestinal: Negative.    Endocrine: Positive for heat intolerance.   Genitourinary: Negative.    Musculoskeletal:  Positive for arthralgias and myalgias.   Skin: Negative.    Allergic/Immunologic: Negative.    Neurological:  Positive for weakness.   Hematological:  Bruises/bleeds easily.   Psychiatric/Behavioral:  Positive for dysphoric mood. The patient is nervous/anxious.    Objective:     Vital Signs (Most Recent):  Temp: 99 °F (37.2 °C) (08/16/22 1212)  Pulse: 78 (08/16/22 1212)  Resp: 18 (08/16/22 1212)  BP: (!) 142/67 (08/16/22 1212)  SpO2: 95 % (08/16/22 1212)   Vital Signs (24h Range):  Temp:  [97.7 °F (36.5 °C)-99 °F (37.2 °C)] 99 °F (37.2 °C)  Pulse:  [61-78] 78  Resp:  [14-19] 18  SpO2:  [95 %-100 %] 95 %  BP: (135-185)/(63-81) 142/67     Weight: 73.4 kg (161 lb 13.1 oz)  Body mass index is 26.12 kg/m².  No intake or output data in the 24 hours ending 08/16/22 1501   Physical Exam  Vitals and nursing note reviewed.   Constitutional:       General: She is not in acute distress.  HENT:      Head: Normocephalic and atraumatic.      Nose: Nose normal.      Mouth/Throat:      Mouth: Mucous membranes are moist.   Eyes:      Extraocular Movements: Extraocular movements intact.      Pupils: Pupils are equal, round, and reactive to light.   Cardiovascular:      Rate and Rhythm: Normal rate and regular rhythm.      Heart sounds:     Gallop present.   Pulmonary:      Effort: Pulmonary effort is normal.      Breath sounds: Normal breath sounds.   Abdominal:      General: There is distension.      Tenderness: There is no abdominal tenderness. There is no guarding or rebound.   Musculoskeletal:          General: Normal range of motion.      Cervical back: Normal range of motion and neck supple.   Skin:     General: Skin is warm.   Neurological:      Mental Status: She is alert and oriented to person, place, and time.   Psychiatric:      Comments: Dysphoric mood       Significant Labs: All pertinent labs within the past 24 hours have been reviewed.  Recent Lab Results  (Last 5 results in the past 24 hours)        08/16/22  1138   08/16/22  0846   08/16/22  0751   08/16/22  0515   08/16/22  0349        Albumin         3.9       Alkaline Phosphatase         39       ALT         18       Anion Gap         9       AST         18       Baso #         0.04       Basophil %         0.9       BILIRUBIN TOTAL         0.5  Comment: For infants and newborns, interpretation of results should be based  on gestational age, weight and in agreement with clinical  observations.    Premature Infant recommended reference ranges:  Up to 24 hours.............<8.0 mg/dL  Up to 48 hours............<12.0 mg/dL  3-5 days..................<15.0 mg/dL  6-29 days.................<15.0 mg/dL         BNP         19  Comment: Values of less than 100 pg/ml are consistent with non-CHF populations.       BUN         17       Calcium         8.1       Chloride         104       CO2         24       Creatinine         1.0       Differential Method         Automated       eGFR         57.7       Eos #         0.1       Eosinophil %         2.7       Free T4         0.80       Glucose         106       Gran # (ANC)         2.5       Gran %         54.8       Hematocrit         35.6       Hemoglobin         11.3       Immature Grans (Abs)         0.01  Comment: Mild elevation in immature granulocytes is non specific and   can be seen in a variety of conditions including stress response,   acute inflammation, trauma and pregnancy. Correlation with other   laboratory and clinical findings is essential.         Immature Granulocytes         0.2        INR         1.0  Comment: Coumadin Therapy:  INR: 2.0-3.0 conventional anticoagulation    INR: 2.5-3.5 intensive anticoagulation         Lymph #         1.5       Lymph %         33.2       Magnesium         2.1       MCH         28.4       MCHC         31.7       MCV         89       Mono #         0.4       Mono %         8.2       MPV         8.8       nRBC         0       Platelets         233       POC Glucose 91     86           Potassium         3.6       PROTEIN TOTAL         7.5       PT         12.8       RBC         3.98       RDW         13.2       SARS-CoV-2 RNA, Amplification, Qual       Negative  Comment: This test utilizes isothermal nucleic acid amplification   technology to detect the SARS-CoV-2 RdRp nucleic acid segment.   The analytical sensitivity (limit of detection) is 125 genome   equivalents/mL.     A POSITIVE result implies infection with the SARS-CoV-2 virus;  the patient is presumed to be contagious.    A NEGATIVE result means that SARS-CoV-2 nucleic acids are not  present above the limit of detection. A NEGATIVE result should be   treated as presumptive. It does not rule out the possibility of   COVID-19 and should not be the sole basis for treatment decisions.   If COVID-19 is strongly suspected based on clinical and exposure   history, re-testing using an alternate molecular assay should be   considered.       This test is only for use under the Food and Drug   Administration s Emergency Use Authorization (EUA).   Commercial kits are provided by [employer].   Performance characteristics of the EUA have been independently  verified by [facility] Department of  Pathology and Laboratory Medicine.   _________________________________________________________________  The ID NOW COVID-19 Letter of Authorization, along with the   authorized Fact Sheet for Healthcare Providers, the authorized Fact  Sheet for Patients, and authorized labeling are available on the FDA    website:  www.fda.gov/MedicalDevices/Safety/EmergencySituations/kie130194.htm           Sodium         137       Troponin I   <0.030       <0.030       TSH         0.140       WBC         4.49                              Significant Imaging: I have reviewed all pertinent imaging results/findings within the past 24 hours.

## 2022-08-16 NOTE — ED PROVIDER NOTES
Encounter Date: 8/16/2022       History     Chief Complaint   Patient presents with    Chest Pain     Sharp, lasting around 5 min, resolved.    Fatigue     generalized     Chief complaint chest pain midsternal left anterior chest lasting about 5 minutes.  It occurred at home.  She called the ambulance now is chest pain-free.  She has a history of anxiety cancer hypertension high cholesterol Goiter,  thyroid cancer she complains of having 3 episodes like this over the last few days which is unusual.  She has never had a angiogram done in the past she states.        Review of patient's allergies indicates:  No Known Allergies  Past Medical History:   Diagnosis Date    Adenomatous polyp of colon 3/26/2012    Allergy     Anxiety     Cancer     Cataract     Colon polyp     Degenerative arthritis of knee 04/03/2012    Diverticulosis     Encounter for blood transfusion     GERD (gastroesophageal reflux disease)     History of thyroid cancer 2021    Hyperlipidemia     Hypertension     Lateral meniscal tear 5/20/2013    Multinodular goiter 03/26/2012    Myopathy, unspecified 01/18/2010    Tuberculosis      Past Surgical History:   Procedure Laterality Date    BREAST BIOPSY Left 2015    benign    CHOLECYSTECTOMY      COLONOSCOPY  12/2/15    Dr. Britton, multiple polyps, recheck five years-three years if more than 2 polyps are adenomatous    COLONOSCOPY N/A 12/2/2015    COLONOSCOPY N/A 3/20/2019    Procedure: COLONOSCOPY;  Surgeon: Jose Britton MD;  [facility];  Service: Endoscopy;  Laterality: N/A;    COLONOSCOPY N/A 5/20/2020    Dr. Britton; internal hemorrhoids; diverticulosis; polyps removed; repeat in 3 years    CYSTOSCOPY N/A 8/26/2020    Procedure: CYSTOSCOPY;  Surgeon: Charlotte Walters Jr., MD;  Location: [facility] OR;  Service: Urology;  Laterality: N/A;    DISSECTION OF NECK Left 9/13/2021    Procedure: DISSECTION, NECK;  Surgeon: Ryan Torres MD;  [facility];   Service: ENT;  Laterality: Left;    ESOPHAGOGASTRODUODENOSCOPY N/A 5/20/2020    Dr. Britton; small hiatal hernia; gastritis; 8 gastric polyps removed    ESOPHAGOGASTRODUODENOSCOPY N/A 4/1/2022    Procedure: EGD (ESOPHAGOGASTRODUODENOSCOPY);  Surgeon: Jose Britton MD;  Location: [facility] ENDO;  Service: Endoscopy;  Laterality: N/A;    FOOT SURGERY      HYSTERECTOMY      TVH/BSO > 20 years    INTRALUMINAL GASTROINTESTINAL TRACT IMAGING VIA CAPSULE N/A 6/4/2020    Procedure: IMAGING PROCEDURE, GI TRACT, INTRALUMINAL, VIA CAPSULE;  Surgeon: Jose Britton MD;  Location: [facility] ENDO;  Service: Endoscopy;  Laterality: N/A;    KNEE SURGERY  06/06/2013    right knee tear Dr Iverson     LAPAROSCOPIC CHOLECYSTECTOMY N/A 9/17/2020    Procedure: CHOLECYSTECTOMY, LAPAROSCOPIC;  Surgeon: Forrest Veronica MD;  Location: [facility];  Service: General;  Laterality: N/A;    LUNG LOBECTOMY  1966    right middle lobectomy, due to Tb    OOPHORECTOMY      SHOULDER SURGERY      francis     THYROIDECTOMY Bilateral 5/19/2021    Procedure: THYROIDECTOMY;  Surgeon: Jamia Amezquita MD;  [facility];  Service: General;  Laterality: Bilateral;    THYROIDECTOMY Bilateral 9/13/2021    Procedure: THYROIDECTOMY WITH INTRA-OP PTH;  Surgeon: Ryan Torres MD;  Location: [facility] OR 2ND FLR;  Service: ENT;  Laterality: Bilateral;  NIM tube  Intraop PTH     Family History   Problem Relation Age of Onset    Colon cancer Other     Cancer Mother     Hypertension Mother     Hyperlipidemia Mother     Cancer Brother     Hypertension Father     Emphysema Father     Diabetes Son     Hypertension Son     Alcohol abuse Son     Diabetes Maternal Aunt     Alzheimer's disease Maternal Uncle     Cancer Maternal Grandmother     No Known Problems Daughter     Dementia Sister     Alzheimer's disease Sister     Breast cancer Sister 60    Obesity Paternal Uncle     Prostate cancer Other     Cancer Brother     Melanoma Neg Hx      Psoriasis Neg Hx     Lupus Neg Hx     Eczema Neg Hx     Amblyopia Neg Hx     Blindness Neg Hx     Cataracts Neg Hx     Glaucoma Neg Hx     Macular degeneration Neg Hx     Retinal detachment Neg Hx     Strabismus Neg Hx     Stroke Neg Hx     Thyroid cancer Neg Hx     Colon polyps Neg Hx     Ulcerative colitis Neg Hx     Stomach cancer Neg Hx     Rectal cancer Neg Hx      Social History     Tobacco Use    Smoking status: Never Smoker    Smokeless tobacco: Never Used   Substance Use Topics    Alcohol use: Not Currently     Comment: stopped 2019    Drug use: No     Review of Systems   Constitutional: Negative for chills and fever.   HENT: Negative for ear pain, rhinorrhea and sore throat.    Eyes: Negative for pain and visual disturbance.   Respiratory: Negative for cough and shortness of breath.    Cardiovascular: Positive for chest pain. Negative for palpitations.   Gastrointestinal: Negative for abdominal pain, constipation, diarrhea, nausea and vomiting.   Genitourinary: Negative for dysuria, frequency, hematuria and urgency.   Musculoskeletal: Negative for back pain, joint swelling and myalgias.   Skin: Negative for rash.   Neurological: Negative for dizziness, seizures, weakness and headaches.   Psychiatric/Behavioral: Negative for dysphoric mood. The patient is not nervous/anxious.        Physical Exam     Initial Vitals [08/16/22 0321]   BP Pulse Resp Temp SpO2   (!) 185/81 77 16 98 °F (36.7 °C) 98 %      MAP       --         Physical Exam    Nursing note and vitals reviewed.  Constitutional: She appears well-developed and well-nourished.   HENT:   Head: Normocephalic and atraumatic.   Eyes: Conjunctivae, EOM and lids are normal. Pupils are equal, round, and reactive to light.   Neck: Trachea normal. Neck supple. No thyroid mass and no thyromegaly present.   Normal range of motion.  Cardiovascular: Normal rate, regular rhythm and normal heart sounds.   Pulmonary/Chest: Effort normal and  breath sounds normal.   Abdominal: Abdomen is soft. There is no abdominal tenderness.   Musculoskeletal:         General: Normal range of motion.      Cervical back: Normal range of motion and neck supple.     Neurological: She is alert and oriented to person, place, and time. She has normal strength and normal reflexes. No cranial nerve deficit or sensory deficit.   Skin: Skin is warm and dry.   Psychiatric: She has a normal mood and affect. Her speech is normal and behavior is normal. Judgment and thought content normal.         ED Course   Procedures  Labs Reviewed   CBC W/ AUTO DIFFERENTIAL - Abnormal; Notable for the following components:       Result Value    RBC 3.98 (*)     Hemoglobin 11.3 (*)     Hematocrit 35.6 (*)     MCHC 31.7 (*)     MPV 8.8 (*)     All other components within normal limits   COMPREHENSIVE METABOLIC PANEL - Abnormal; Notable for the following components:    Calcium 8.1 (*)     Alkaline Phosphatase 39 (*)     eGFR 57.7 (*)     All other components within normal limits   B-TYPE NATRIURETIC PEPTIDE   TROPONIN I   PROTIME-INR   SARS-COV-2 RNA AMPLIFICATION, QUAL     EKG Readings: (Independently Interpreted)   EKG reveals normal sinus rhythm no ST elevation no acute changes.       Imaging Results          X-Ray Chest AP Portable (In process)                  Medications   aspirin chewable tablet 81 mg (81 mg Oral Given 8/16/22 7530)     Medical Decision Making:   ED Management:  Case discussed with the hospitalist patient to be admitted for ACS workup for unstable angina.                      Clinical Impression:   Final diagnoses:  [R07.9] Chest pain, unspecified type (Primary)          ED Disposition Condition    Admit               Carli Ambrocio MD  08/16/22 0743

## 2022-08-16 NOTE — ASSESSMENT & PLAN NOTE
Trend troponin 6 hours  Telemetry monitoringContinue with home aspirin 81 mg a day  Continue with home medications of Remeron 7.5 mg at bedtime Cymbalta we will put patient back on pantoprazole 40 mg a day since she was switched last week vitamin B12 1000 mcg a day magnesium level now continue with home medications as appropriate

## 2022-08-16 NOTE — CONSULTS
[facility]  Department of Cardiology  Consult Note      PATIENT NAME: Erica Masterson  MRN: [id number]  TODAY'S DATE: 08/16/2022  ADMIT DATE: 8/16/2022                          CONSULT REQUESTED BY: Juan M Solis MD    SUBJECTIVE     PRINCIPAL PROBLEM: Chest pain      REASON FOR CONSULT:  Chest Pain    HPI:    Ms. Masterson is a 76 year old female patient with a PMH significant for HTN, Dyslipidemia, Thyroid Cancer s/p Thyroidectomy, gastritis/GERD presented to the ER with CP. It is not reproducible. She has been on pepcid for the last 2 days prior to this was on protonix and felt as it caused much problems. She had a stress test in March of this year that was negative for RI. TSH is low. Follows with Dr. FAMILIA Chopra. For cardiology known to Dr. Robles.  She present with intermittent episodes of vague discomfort in the precordial region, has atypical features, transient in nature without radiation of pain to arm neck or jaw and no associated with the chest wall movement or any association with meals.        Principal Problem:Chest pain     Chief Complaint:        Chief Complaint   Patient presents with    Chest Pain       Sharp, lasting around 5 min, resolved.    Fatigue       generalized         HPI:   76 years old female with past medical history hypertension dyslipidemia anxiety thyroid cancer status post thyroidectomy on levothyroxine supplement since then history of gastritis on EGD done in April 2022 she has a nuclear stress test done in March 2022 which was negative for ischemia patient states that the LEs weeks she was switch from pantoprazole to Pepcid she also has a history of GERD peripheral artery disease dyslipidemia on last admission she was admitted due to being unable to ambulate she was evaluated by Neurology and Neurosurgery no acute explanation for it was found she was then evaluated by Psychiatry that started patient on Remeron 7.5 mg at bedtime and kept her Cymbalta she comes to the ER  "today because she states she will call pain was having a sensation of "pins pinching" her chest, on left side, no radiation no nausea no vomit she does have some shortness of breath she also said that she does have some periumbilical abdominal pain since yesterday it has been on and off no diarrhea denies any blood in stools, no nausea or vomit, once her abdomen ER her 1st set of EKG and troponin is negative, she is going to be admitted.        Review of patient's allergies indicates:  No Known Allergies    Past Medical History:   Diagnosis Date    Adenomatous polyp of colon 3/26/2012    Allergy     Anxiety     Cancer     Cataract     Colon polyp     Degenerative arthritis of knee 04/03/2012    Diverticulosis     Encounter for blood transfusion     GERD (gastroesophageal reflux disease)     History of thyroid cancer 2021    Hyperlipidemia     Hypertension     Lateral meniscal tear 5/20/2013    Multinodular goiter 03/26/2012    Myopathy, unspecified 01/18/2010    Tuberculosis      Past Surgical History:   Procedure Laterality Date    BREAST BIOPSY Left 2015    benign    CHOLECYSTECTOMY      COLONOSCOPY  12/2/15    Dr. Britton, multiple polyps, recheck five years-three years if more than 2 polyps are adenomatous    COLONOSCOPY N/A 12/2/2015    COLONOSCOPY N/A 3/20/2019    Procedure: COLONOSCOPY;  Surgeon: Jose Britton MD;  Location: [facility];  Service: Endoscopy;  Laterality: N/A;    COLONOSCOPY N/A 5/20/2020    Dr. Britton; internal hemorrhoids; diverticulosis; polyps removed; repeat in 3 years    CYSTOSCOPY N/A 8/26/2020    Procedure: CYSTOSCOPY;  Surgeon: Charlotte Walters Jr., MD;  Location[facility];  Service: Urology;  Laterality: N/A;    DISSECTION OF NECK Left 9/13/2021    Procedure: DISSECTION, NECK;  Surgeon: Ryan Torres MD;  Location: [address];  Service: ENT;  Laterality: Left;    ESOPHAGOGASTRODUODENOSCOPY N/A 5/20/2020    Dr. Britton; small hiatal hernia; gastritis; " 8 gastric polyps removed    ESOPHAGOGASTRODUODENOSCOPY N/A 4/1/2022    Procedure: EGD (ESOPHAGOGASTRODUODENOSCOPY);  Surgeon: Jose Britton MD;  Location: [facility] ENDO;  Service: Endoscopy;  Laterality: N/A;    FOOT SURGERY      HYSTERECTOMY      TVH/BSO > 20 years    INTRALUMINAL GASTROINTESTINAL TRACT IMAGING VIA CAPSULE N/A 6/4/2020    Procedure: IMAGING PROCEDURE, GI TRACT, INTRALUMINAL, VIA CAPSULE;  Surgeon: Jose Britton MD;  Location: [facility] ENDO;  Service: Endoscopy;  Laterality: N/A;    KNEE SURGERY  06/06/2013    right knee tear Dr Iverson     LAPAROSCOPIC CHOLECYSTECTOMY N/A 9/17/2020    Procedure: CHOLECYSTECTOMY, LAPAROSCOPIC;  Surgeon: Forrest Veronica MD;  Location: [facility];  Service: General;  Laterality: N/A;    LUNG LOBECTOMY  1966    right middle lobectomy, due to Tb    OOPHORECTOMY      SHOULDER SURGERY      francis     THYROIDECTOMY Bilateral 5/19/2021    Procedure: THYROIDECTOMY;  Surgeon: Jamia Amezquita MD;  Location: [facility] OR 2ND FLR;  Service: General;  Laterality: Bilateral;    THYROIDECTOMY Bilateral 9/13/2021    Procedure: THYROIDECTOMY WITH INTRA-OP PTH;  Surgeon: Ryan Torres MD;  Location: [facility] OR 2ND FLR;  Service: ENT;  Laterality: Bilateral;  NIM tube  Intraop PTH     Social History     Tobacco Use    Smoking status: Never Smoker    Smokeless tobacco: Never Used   Substance Use Topics    Alcohol use: Not Currently     Comment: stopped 2019    Drug use: No        REVIEW OF SYSTEMS      Atypical cp  Hoarse voice especially in the morning   patient was arm on thyroid supplements on at 112 mcg a day this was then lowered to 88 mcg gradually    OBJECTIVE     VITAL SIGNS (Most Recent)  Temp: 99 °F (37.2 °C) (08/16/22 1212)  Pulse: 78 (08/16/22 1212)  Resp: 18 (08/16/22 1212)  BP: (!) 142/67 (08/16/22 1212)  SpO2: 95 % (08/16/22 1212)    VENTILATION STATUS  Resp: 18 (08/16/22 1212)  SpO2: 95 % (08/16/22 1212)       I & O (Last 24H):No intake or output data in the  24 hours ending 08/16/22 1415    WEIGHTS  Wt Readings from Last 3 Encounters:   08/16/22 0800 73.4 kg (161 lb 13.1 oz)   08/16/22 0321 67.6 kg (149 lb)   08/15/22 0806 66 kg (145 lb 8.1 oz)   08/12/22 0957 66.7 kg (147 lb)       PHYSICAL EXAM  GENERAL: well built, well nourished, well-developed in no apparent distress alert and oriented.   HEENT: Normocephalic. Pupils normal and conjunctivae normal.  Mucous membranes normal, no cyanosis or icterus, trachea central,no pallor or icterus is noted..   NECK: No JVD. No bruit..   THYROID:  Thyroidectomy scar is present   CARDIAC: Regular rate and rhythm. S1 is normal.S2 is normal.No gallops, clicks or murmurs noted at this time.  CHEST ANATOMY: normal.   LUNGS: Clear to auscultation. No wheezing or rhonchi..   ABDOMEN: Soft no masses or organomegaly.  No abdomen pulsations or bruits.  Normal bowel sounds. No pulsations and no masses felt, No guarding or rebound.   URINARY: No monsivais catheter   EXTREMITIES: No cyanosis, clubbing or edema noted at this time., no calf tenderness bilaterally.   PERIPHERAL VASCULAR SYSTEM: Good palpable distal pulses.   CENTRAL NERVOUS SYSTEM: No focal motor or sensory deficits noted.   SKIN: Skin without lesions, moist, well perfused.   MUSCLE STRENGTH & TONE: No noteable weakness, atrophy or abnormal movement.     HOME MEDICATIONS:  No current facility-administered medications on file prior to encounter.     Current Outpatient Medications on File Prior to Encounter   Medication Sig Dispense Refill    amLODIPine (NORVASC) 2.5 MG tablet Take 1 tablet by mouth once daily 90 tablet 0    baclofen (LIORESAL) 10 MG tablet Take 1 tablet (10 mg total) by mouth 3 (three) times daily. Start w. Half a tab first 7 days 90 tablet 6    carboxymethylcellulose sodium (REFRESH OPHT) Apply 1 drop to eye daily as needed.      DULoxetine (CYMBALTA) 30 MG capsule Take 1 capsule (30 mg total) by mouth once daily. 30 capsule 11    ergocalciferol  (ERGOCALCIFEROL) 50,000 unit Cap Take 1 capsule (50,000 Units total) by mouth every 30 days. 3 capsule 3    famotidine (PEPCID) 20 MG tablet Take 1 tablet (20 mg total) by mouth 2 (two) times daily. 30 tablet 0    gabapentin (NEURONTIN) 300 MG capsule Take 1 capsule (300 mg total) by mouth 2 (two) times daily. 90 capsule 0    lansoprazole (PREVACID) 30 MG capsule Take 30 mg by mouth once daily.      levothyroxine (SYNTHROID) 88 MCG tablet Take 1 tablet (88 mcg total) by mouth before breakfast. 30 tablet 11    losartan (COZAAR) 100 MG tablet Take 1 tablet by mouth once daily 90 tablet 1    mirtazapine (REMERON) 7.5 MG Tab Take 1 tablet (7.5 mg total) by mouth every evening. 90 tablet 0    polyethylene glycol (GLYCOLAX) 17 gram PwPk Take 17 g by mouth once daily. 30 each 0    rosuvastatin (CRESTOR) 20 MG tablet Take 0.5 tablets (10 mg total) by mouth every evening. 90 tablet 0    blood sugar diagnostic (BLOOD GLUCOSE TEST) Strp True Metrix glucose strips check once daily 100 each 3    blood-glucose meter kit True Metrix glucometer check glucose once daily 1 each 0    calcium carbonate (TUMS) 200 mg calcium (500 mg) chewable tablet Chew and swallow 2 tablets (1,000 mg total) by mouth 3 (three) times daily. for 14 days 100 tablet 0    conjugated estrogens (PREMARIN) vaginal cream Place 0.5 g vaginally once daily AND 0.5 g twice a week. (Patient taking differently: Place 0.5 g vaginally once daily AND 0.5 g twice a week. Saturdays and tuesdays) 30 g 7    pantoprazole (PROTONIX) 40 MG tablet Take 1 tablet (40 mg total) by mouth once daily. 30 tablet 2    simethicone (MYLICON) 125 MG chewable tablet Take 125 mg by mouth every 6 (six) hours as needed for Flatulence.      UNABLE TO FIND I B guard bid         SCHEDULED MEDS:   amLODIPine  2.5 mg Oral Daily    atorvastatin  40 mg Oral Daily    calcium carbonate  1,000 mg Oral TID    cyanocobalamin  1,000 mcg Oral Daily    DULoxetine  30 mg Oral Daily     levothyroxine  88 mcg Oral Before breakfast    losartan  100 mg Oral Daily    mirtazapine  7.5 mg Oral QHS    nystatin  500,000 Units Oral QID    pantoprazole  40 mg Intravenous Daily       CONTINUOUS INFUSIONS:    PRN MEDS:acetaminophen, carboxymethylcellulose, magnesium oxide, magnesium oxide, potassium bicarbonate, potassium bicarbonate, potassium bicarbonate, potassium, sodium phosphates, potassium, sodium phosphates, potassium, sodium phosphates, simethicone, sodium chloride 0.9%    LABS AND DIAGNOSTICS     CBC LAST 3 DAYS  Recent Labs   Lab 08/12/22  1042 08/16/22  0349   WBC 4.32 4.49   RBC 4.14 3.98*   HGB 12.1 11.3*   HCT 36.3* 35.6*   MCV 88 89   MCH 29.2 28.4   MCHC 33.3 31.7*   RDW 13.0 13.2    233   MPV 9.4 8.8*   GRAN 64.4  2.8 54.8  2.5   LYMPH 25.5  1.1 33.2  1.5   MONO 7.4  0.3 8.2  0.4   BASO 0.04 0.04   NRBC 0 0       COAGULATION LAST 3 DAYS  Recent Labs   Lab 08/16/22  0349   LABPT 12.8   INR 1.0       CHEMISTRY LAST 3 DAYS  Recent Labs   Lab 08/12/22  1042 08/16/22  0349    137   K 3.8 3.6    104   CO2 25 24   ANIONGAP 12 9   BUN 12 17   CREATININE 1.1 1.0   GLU 97 106   CALCIUM 8.6* 8.1*   MG  --  2.1   ALBUMIN 3.7 3.9   PROT 7.4 7.5   ALKPHOS 52* 39*   ALT 23 18   AST 24 18   BILITOT 0.4 0.5       CARDIAC PROFILE LAST 3 DAYS  Recent Labs   Lab 08/16/22  0349 08/16/22  0846   BNP 19  --    TROPONINI <0.030 <0.030       ENDOCRINE LAST 3 DAYS  Recent Labs   Lab 08/16/22  0349   TSH 0.140*       LAST ARTERIAL BLOOD GAS  ABG  No results for input(s): PH, PO2, PCO2, HCO3, BE in the last 168 hours.    LAST 7 DAYS MICROBIOLOGY   Microbiology Results (last 7 days)     ** No results found for the last 168 hours. **          MOST RECENT IMAGING  X-Ray Chest AP Portable  Reason: chest pain    FINDINGS:    AP chest radiograph with comparison chest x-ray August 4, 2022 show normal cardiomediastinal silhouette.  There are linear densities of the right lung base. Pulmonary  vasculature is normal. No acute osseous abnormality.    IMPRESSION:    Linear densities of the right lung base may reflect atelectasis or focal infiltrate.    Electronically signed by:  Adriel Oneill DO  8/16/2022 5:49 AM CDT Workstation: [id number]      ECHOCARDIOGRAM RESULTS (last 5)  Results for orders placed during the hospital encounter of 08/06/22    Echo Saline Bubble? Yes    Interpretation Summary  · There is no evidence of intracardiac shunting.  · The left ventricle is normal in size with concentric remodeling and normal systolic function.  · The estimated ejection fraction is 65%.  · Grade I left ventricular diastolic dysfunction.  · Normal right ventricular size with normal right ventricular systolic function.  · Normal central venous pressure (3 mmHg).  · There is an interatrial septal aneurysm present described on previous echocardiograms      CURRENT/PREVIOUS VISIT EKG  Results for orders placed or performed during the hospital encounter of 08/16/22   EKG 12-lead    Collection Time: 08/16/22  3:24 AM    Narrative    Test Reason : R07.9,    Vent. Rate : 078 BPM     Atrial Rate : 078 BPM     P-R Int : 160 ms          QRS Dur : 090 ms      QT Int : 418 ms       P-R-T Axes : 066 045 071 degrees     QTc Int : 476 ms    Normal sinus rhythm  Normal ECG  When compared with ECG of 12-AUG-2022 11:47,  No significant change was found    Referred By: [email or web address]   SELF           Confirmed By:            ASSESSMENT/PLAN:     Active Hospital Problems    Diagnosis    *Chest pain       ASSESSMENT & PLAN:     1. Atypical CP- TROP X 2  2. Thyroid Cancer s/p thyroidectomy  3. Hyperthyroid secondary to supplement  4. GERD      RECOMMENDATIONS:      CP seems to be GI in nature  No further cardiac testing warranted  Follow up with Dr. Travis Ivy and GI as an OP  Thank you for the consultation.      Ananya Guardado NP  [facility]  Department of Cardiology  Date of Service: 08/16/2022      History as above,  patient has intolerance or feels like symptoms are related to Protonix consider discontinuing Protonix and keep her on H2 blocking agents with some Carafate.  Encouraged to have GI  follow-up  evaluation as an outpatient  I have personally interviewed and examined the patient, I have reviewed the Nurse Practitioner's history and physical, assessment, and plan. I agree with the findings and plan.      Ahsan Clemens M.D.  [facility]  Department of Cardiology  Date of Service: 08/16/2022  2:15 PM

## 2022-08-17 VITALS
HEART RATE: 74 BPM | TEMPERATURE: 98 F | RESPIRATION RATE: 18 BRPM | OXYGEN SATURATION: 98 % | DIASTOLIC BLOOD PRESSURE: 74 MMHG | WEIGHT: 161.81 LBS | BODY MASS INDEX: 26.01 KG/M2 | HEIGHT: 66 IN | SYSTOLIC BLOOD PRESSURE: 119 MMHG

## 2022-08-17 LAB
ANION GAP SERPL CALC-SCNC: 5 MMOL/L (ref 8–16)
BASOPHILS # BLD AUTO: 0.03 K/UL (ref 0–0.2)
BASOPHILS NFR BLD: 0.9 % (ref 0–1.9)
BUN SERPL-MCNC: 18 MG/DL (ref 8–23)
CALCIUM SERPL-MCNC: 8.3 MG/DL (ref 8.7–10.5)
CHLORIDE SERPL-SCNC: 105 MMOL/L (ref 95–110)
CO2 SERPL-SCNC: 29 MMOL/L (ref 23–29)
CREAT SERPL-MCNC: 1.1 MG/DL (ref 0.5–1.4)
DIFFERENTIAL METHOD: ABNORMAL
EOSINOPHIL # BLD AUTO: 0.1 K/UL (ref 0–0.5)
EOSINOPHIL NFR BLD: 3.4 % (ref 0–8)
ERYTHROCYTE [DISTWIDTH] IN BLOOD BY AUTOMATED COUNT: 13.1 % (ref 11.5–14.5)
EST. GFR  (NO RACE VARIABLE): 51.4 ML/MIN/1.73 M^2
GLUCOSE SERPL-MCNC: 107 MG/DL (ref 70–110)
HCT VFR BLD AUTO: 32.3 % (ref 37–48.5)
HGB BLD-MCNC: 10.4 G/DL (ref 12–16)
IMM GRANULOCYTES # BLD AUTO: 0.01 K/UL (ref 0–0.04)
IMM GRANULOCYTES NFR BLD AUTO: 0.3 % (ref 0–0.5)
LYMPHOCYTES # BLD AUTO: 1 K/UL (ref 1–4.8)
LYMPHOCYTES NFR BLD: 31.6 % (ref 18–48)
MCH RBC QN AUTO: 28.6 PG (ref 27–31)
MCHC RBC AUTO-ENTMCNC: 32.2 G/DL (ref 32–36)
MCV RBC AUTO: 89 FL (ref 82–98)
MONOCYTES # BLD AUTO: 0.3 K/UL (ref 0.3–1)
MONOCYTES NFR BLD: 8.6 % (ref 4–15)
NEUTROPHILS # BLD AUTO: 1.8 K/UL (ref 1.8–7.7)
NEUTROPHILS NFR BLD: 55.2 % (ref 38–73)
NRBC BLD-RTO: 0 /100 WBC
PLATELET # BLD AUTO: 234 K/UL (ref 150–450)
PMV BLD AUTO: 8.7 FL (ref 9.2–12.9)
POTASSIUM SERPL-SCNC: 3.9 MMOL/L (ref 3.5–5.1)
RBC # BLD AUTO: 3.64 M/UL (ref 4–5.4)
SODIUM SERPL-SCNC: 139 MMOL/L (ref 136–145)
WBC # BLD AUTO: 3.26 K/UL (ref 3.9–12.7)

## 2022-08-17 PROCEDURE — 25000003 PHARM REV CODE 250: Performed by: INTERNAL MEDICINE

## 2022-08-17 PROCEDURE — 36415 COLL VENOUS BLD VENIPUNCTURE: CPT | Performed by: INTERNAL MEDICINE

## 2022-08-17 PROCEDURE — 85025 COMPLETE CBC W/AUTO DIFF WBC: CPT | Performed by: INTERNAL MEDICINE

## 2022-08-17 PROCEDURE — G0378 HOSPITAL OBSERVATION PER HR: HCPCS

## 2022-08-17 PROCEDURE — 80048 BASIC METABOLIC PNL TOTAL CA: CPT | Performed by: INTERNAL MEDICINE

## 2022-08-17 RX ORDER — MIRTAZAPINE 15 MG/1
15 TABLET, FILM COATED ORAL NIGHTLY
Qty: 30 TABLET | Refills: 0 | Status: SHIPPED | OUTPATIENT
Start: 2022-08-17 | End: 2022-12-07

## 2022-08-17 RX ORDER — SUCRALFATE 1 G/10ML
1 SUSPENSION ORAL EVERY 6 HOURS
Qty: 1200 ML | Refills: 0 | Status: SHIPPED | OUTPATIENT
Start: 2022-08-17 | End: 2022-08-18

## 2022-08-17 RX ORDER — LIDOCAINE 50 MG/G
1 PATCH TOPICAL DAILY
Qty: 30 PATCH | Refills: 0 | Status: SHIPPED | OUTPATIENT
Start: 2022-08-17 | End: 2022-08-18

## 2022-08-17 RX ORDER — ALPRAZOLAM 0.25 MG/1
0.25 TABLET ORAL ONCE
Status: COMPLETED | OUTPATIENT
Start: 2022-08-17 | End: 2022-08-17

## 2022-08-17 RX ADMIN — ALPRAZOLAM 0.25 MG: 0.25 TABLET ORAL at 02:08

## 2022-08-17 RX ADMIN — SUCRALFATE 1 G: 1 SUSPENSION ORAL at 06:08

## 2022-08-17 NOTE — DISCHARGE SUMMARY
"[facility] Medicine  Discharge Summary      Patient Name: Erica Masterson  MRN: [id number]  Patient Class: OP- Observation  Admission Date: 8/16/2022  Hospital Length of Stay: 0 days  Discharge Date and Time:  08/17/2022 7:17 AM  Attending Physician: Juan M Solis MD   Discharging Provider: Juan M Solis MD  Primary Care Provider: Narayan Myers MD      HPI:     76 years old female with past medical history hypertension dyslipidemia anxiety thyroid cancer status post thyroidectomy on levothyroxine supplement since then history of gastritis on EGD done in April 2022 she has a nuclear stress test done in March 2022 which was negative for ischemia patient states that the LEs weeks she was switch from pantoprazole to Pepcid she also has a history of GERD peripheral artery disease dyslipidemia on last admission she was admitted due to being unable to ambulate she was evaluated by Neurology and Neurosurgery no acute explanation for it was found she was then evaluated by Psychiatry that started patient on Remeron 7.5 mg at bedtime and kept her Cymbalta she comes to the ER today because she states she will call pain was having a sensation of "pins pinching" her chest, on left side, no radiation no nausea no vomit she does have some shortness of breath she also said that she does have some periumbilical abdominal pain since yesterday it has been on and off no diarrhea denies any blood in stools, no nausea or vomit, once her abdomen ER her 1st set of EKG and troponin is negative, she is going to be admitted.      * No surgery found *      Hospital Course:   8/16/2022  Ms Masterson complains of GERD and thrush. She is very anxious and continues to have the same problems    8/17/2022  Ms Masterson has no chest pain or gerd symptoms  She is scrubbing the meibomian gland cyst of the left eye 2 times per day and if it does not resolve will seek ocular care  Pt notes that her anxiety is decreased but that " she sometimes feels overwhelmed  ROS: see above otherwise negative X 6  PE in no distress HEENT TAYLOR EOM intact moist mucus membranes Neck supple no use of accessory muscles Lungs no crackles wheezes or rhonchi Heart S 1 S 2 RRR no murmur Abdomen BS+ nontender Ext without CC or E pulses 1-2+ Skin no acute finding Neuro anxious no acute sensory or motor deficit         Goals of Care Treatment Preferences:  Code Status: Full Code      Consults:   Consults (From admission, onward)        Status Ordering Provider     Inpatient consult to Cardiology  Once        Provider:  Sundar Clemens MD    Completed AMELIA ARNDT     Inpatient consult to Hospitalist  Once        Provider:  Timmy Ramos MD    Acknowledged CHAO ESTRADA          No new Assessment & Plan notes have been filed under this hospital service since the last note was generated.  Service: Hospital Medicine    Final Active Diagnoses:    Diagnosis Date Noted POA    PRINCIPAL PROBLEM:  Chest pain [R07.9] 08/16/2022 Yes      Problems Resolved During this Admission:       Discharged Condition: good    Disposition: Home or Self Care    Follow Up:   Follow-up Information     Narayan Myers MD Follow up in 1 week(s).    Specialty: Family Medicine  Contact information:  [address]  [address]  [place] [address]  [phone]             Dustin Robles MD Follow up in 2 week(s).    Specialties: Cardiology, Cardiovascular Disease  Contact information:  [address]  [address]  [place] [address]  [phone]             Patrick Wan MD Follow up in 1 month(s).    Specialty: Gastroenterology  Contact information:  [address]  [place] [address]  [phone]                       Patient Instructions:      Diet Cardiac     Activity as tolerated       Significant Diagnostic Studies: Labs:   BMP:   Recent Labs   Lab 08/16/22  0349 08/17/22  0443    107    139   K 3.6 3.9    105   CO2 24 29   BUN 17 18   CREATININE 1.0 1.1    CALCIUM 8.1* 8.3*   MG 2.1  --    , CMP   Recent Labs   Lab 08/16/22  0349 08/17/22  0443    139   K 3.6 3.9    105   CO2 24 29    107   BUN 17 18   CREATININE 1.0 1.1   CALCIUM 8.1* 8.3*   PROT 7.5  --    ALBUMIN 3.9  --    BILITOT 0.5  --    ALKPHOS 39*  --    AST 18  --    ALT 18  --    ANIONGAP 9 5*   , CBC   Recent Labs   Lab 08/16/22  0349 08/17/22  0443   WBC 4.49 3.26*   HGB 11.3* 10.4*   HCT 35.6* 32.3*    234   , INR   Lab Results   Component Value Date    INR 1.0 08/16/2022    INR 1.1 03/21/2022    INR 1.3 06/20/2020   , Troponin   Recent Labs   Lab 08/16/22  0349 08/16/22  0846   TROPONINI <0.030 <0.030    and All labs within the past 24 hours have been reviewed  Microbiology:   Blood Culture   Lab Results   Component Value Date    LABBLOO No growth after 5 days. 03/21/2022    LABBLOO No growth after 5 days. 03/21/2022    and Urine Culture    Lab Results   Component Value Date    LABURIN  12/09/2020     Multiple organisms isolated. None in predominance.  Repeat if    LABURIN clinically necessary. 12/09/2020       Pending Diagnostic Studies:     None         Medications:  Reconciled Home Medications:      Medication List      START taking these medications    LIDOcaine 5 %  Commonly known as: LIDODERM  Place 1 patch onto the skin once daily. Remove & Discard patch within 12 hours or as directed by MD  Apply to area of pain left shoulder     sucralfate 100 mg/mL suspension  Commonly known as: CARAFATE  Take 10 mLs (1 g total) by mouth every 6 (six) hours.        CHANGE how you take these medications    conjugated estrogens vaginal cream  Commonly known as: PREMARIN  Place 0.5 g vaginally once daily AND 0.5 g twice a week.  What changed: See the new instructions.     mirtazapine 15 MG tablet  Commonly known as: REMERON  Take 1 tablet (15 mg total) by mouth every evening.  What changed:   · medication strength  · how much to take        CONTINUE taking these medications     amLODIPine 2.5 MG tablet  Commonly known as: NORVASC  Take 1 tablet by mouth once daily     baclofen 10 MG tablet  Commonly known as: LIORESAL  Take 1 tablet (10 mg total) by mouth 3 (three) times daily. Start w. Half a tab first 7 days     BLOOD GLUCOSE TEST Strp  Generic drug: blood sugar diagnostic  True Metrix glucose strips check once daily     blood-glucose meter kit  True Metrix glucometer check glucose once daily     CAMELIA-GEST ANTACID 200 mg calcium (500 mg) chewable tablet  Generic drug: calcium carbonate  Chew and swallow 2 tablets (1,000 mg total) by mouth 3 (three) times daily. for 14 days     DULoxetine 30 MG capsule  Commonly known as: CYMBALTA  Take 1 capsule (30 mg total) by mouth once daily.     ergocalciferol 50,000 unit Cap  Commonly known as: ERGOCALCIFEROL  Take 1 capsule (50,000 Units total) by mouth every 30 days.     famotidine 20 MG tablet  Commonly known as: PEPCID  Take 1 tablet (20 mg total) by mouth 2 (two) times daily.     gabapentin 300 MG capsule  Commonly known as: NEURONTIN  Take 1 capsule (300 mg total) by mouth 2 (two) times daily.     lansoprazole 30 MG capsule  Commonly known as: PREVACID  Take 30 mg by mouth once daily.     losartan 100 MG tablet  Commonly known as: COZAAR  Take 1 tablet by mouth once daily     pantoprazole 40 MG tablet  Commonly known as: PROTONIX  Take 1 tablet (40 mg total) by mouth once daily.     polyethylene glycol 17 gram Pwpk  Commonly known as: GLYCOLAX  Take 17 g by mouth once daily.     REFRESH OPHT  Apply 1 drop to eye daily as needed.     rosuvastatin 20 MG tablet  Commonly known as: CRESTOR  Take 0.5 tablets (10 mg total) by mouth every evening.     simethicone 125 MG chewable tablet  Commonly known as: MYLICON  Take 125 mg by mouth every 6 (six) hours as needed for Flatulence.        STOP taking these medications    levothyroxine 88 MCG tablet  Commonly known as: SYNTHROID     UNABLE TO FIND            Indwelling Lines/Drains at time of  discharge:   Lines/Drains/Airways     None                 Time spent on the discharge of patient: 26 minutes         Juan M Solis MD  Department of Hospital Medicine  [facility]

## 2022-08-17 NOTE — PLAN OF CARE
Problem: Adult Inpatient Plan of Care  Goal: Plan of Care Review  Outcome: Met  Goal: Patient-Specific Goal (Individualized)  Outcome: Met  Goal: Absence of Hospital-Acquired Illness or Injury  Outcome: Met  Goal: Optimal Comfort and Wellbeing  Outcome: Met  Goal: Readiness for Transition of Care  Outcome: Met     Problem: Diabetes Comorbidity  Goal: Blood Glucose Level Within Targeted Range  Outcome: Met     Problem: Chest Pain  Goal: Resolution of Chest Pain Symptoms  Outcome: Met

## 2022-08-17 NOTE — PLAN OF CARE
met with Pt at bedside to confirm Pt's discharge plans. Pt verbalized plan to discharge home via family transport.  confirmed Pt's PCP follow-up has been scheduled and added to AVS. Per chart review, Pt was current with [facility], but declined to resume services upon discharge. Pt has no other needs to be addressed by case management. Pt cleared to discharge by case management.         08/17/22 0854   Final Note   Assessment Type Final Discharge Note   Anticipated Discharge Disposition Home   What phone number can be called within the next 1-3 days to see how you are doing after discharge? [phone]   Hospital Resources/Appts/Education Provided Provided patient/caregiver with written discharge plan information;Appointments scheduled and added to AVS   Post-Acute Status   Post-Acute Authorization [facility]   Home Health Status Patient declined/refused   Discharge Delays None known at this time

## 2022-08-18 ENCOUNTER — TELEPHONE (OUTPATIENT)
Dept: NEUROLOGY | Facility: CLINIC | Age: 77
End: 2022-08-18
Payer: MEDICARE

## 2022-08-18 ENCOUNTER — EXTERNAL HOME HEALTH (OUTPATIENT)
Dept: HOME HEALTH SERVICES | Facility: HOSPITAL | Age: 77
End: 2022-08-18
Payer: MEDICARE

## 2022-08-18 ENCOUNTER — TELEPHONE (OUTPATIENT)
Dept: OPHTHALMOLOGY | Facility: CLINIC | Age: 77
End: 2022-08-18
Payer: MEDICARE

## 2022-08-18 ENCOUNTER — TELEPHONE (OUTPATIENT)
Dept: FAMILY MEDICINE | Facility: CLINIC | Age: 77
End: 2022-08-18
Payer: MEDICARE

## 2022-08-18 ENCOUNTER — OFFICE VISIT (OUTPATIENT)
Dept: FAMILY MEDICINE | Facility: CLINIC | Age: 77
End: 2022-08-18
Attending: FAMILY MEDICINE
Payer: MEDICARE

## 2022-08-18 VITALS
SYSTOLIC BLOOD PRESSURE: 132 MMHG | DIASTOLIC BLOOD PRESSURE: 70 MMHG | TEMPERATURE: 98 F | WEIGHT: 149.38 LBS | OXYGEN SATURATION: 96 % | BODY MASS INDEX: 24.01 KG/M2 | HEIGHT: 66 IN | HEART RATE: 95 BPM

## 2022-08-18 DIAGNOSIS — G25.2 COARSE TREMORS: ICD-10-CM

## 2022-08-18 DIAGNOSIS — E78.5 HYPERLIPIDEMIA ASSOCIATED WITH TYPE 2 DIABETES MELLITUS: Chronic | ICD-10-CM

## 2022-08-18 DIAGNOSIS — C73 THYROID CANCER: Chronic | ICD-10-CM

## 2022-08-18 DIAGNOSIS — R00.0 TACHYCARDIA: ICD-10-CM

## 2022-08-18 DIAGNOSIS — I15.2 HYPERTENSION ASSOCIATED WITH DIABETES: Chronic | ICD-10-CM

## 2022-08-18 DIAGNOSIS — Z79.899 POLYPHARMACY: ICD-10-CM

## 2022-08-18 DIAGNOSIS — R42 DIZZY: Primary | ICD-10-CM

## 2022-08-18 DIAGNOSIS — R53.1 WEAKNESS: ICD-10-CM

## 2022-08-18 DIAGNOSIS — R25.2 SPASMS OF THE HANDS OR FEET: ICD-10-CM

## 2022-08-18 DIAGNOSIS — C79.9 METASTASIS FROM THYROID CANCER: ICD-10-CM

## 2022-08-18 DIAGNOSIS — C73 METASTASIS FROM THYROID CANCER: ICD-10-CM

## 2022-08-18 DIAGNOSIS — R11.0 NAUSEA: ICD-10-CM

## 2022-08-18 DIAGNOSIS — R51.9 NONINTRACTABLE HEADACHE, UNSPECIFIED CHRONICITY PATTERN, UNSPECIFIED HEADACHE TYPE: ICD-10-CM

## 2022-08-18 DIAGNOSIS — F41.1 GAD (GENERALIZED ANXIETY DISORDER): Chronic | ICD-10-CM

## 2022-08-18 DIAGNOSIS — E89.0 POSTOPERATIVE HYPOTHYROIDISM: Chronic | ICD-10-CM

## 2022-08-18 DIAGNOSIS — E11.59 HYPERTENSION ASSOCIATED WITH DIABETES: Chronic | ICD-10-CM

## 2022-08-18 DIAGNOSIS — E11.69 HYPERLIPIDEMIA ASSOCIATED WITH TYPE 2 DIABETES MELLITUS: Chronic | ICD-10-CM

## 2022-08-18 PROCEDURE — 1159F MED LIST DOCD IN RCRD: CPT | Mod: CPTII,S$GLB,, | Performed by: FAMILY MEDICINE

## 2022-08-18 PROCEDURE — 99999 PR PBB SHADOW E&M-EST. PATIENT-LVL III: ICD-10-PCS | Mod: PBBFAC,,, | Performed by: FAMILY MEDICINE

## 2022-08-18 PROCEDURE — 3078F PR MOST RECENT DIASTOLIC BLOOD PRESSURE < 80 MM HG: ICD-10-PCS | Mod: CPTII,S$GLB,, | Performed by: FAMILY MEDICINE

## 2022-08-18 PROCEDURE — 1126F AMNT PAIN NOTED NONE PRSNT: CPT | Mod: CPTII,S$GLB,, | Performed by: FAMILY MEDICINE

## 2022-08-18 PROCEDURE — 3075F SYST BP GE 130 - 139MM HG: CPT | Mod: CPTII,S$GLB,, | Performed by: FAMILY MEDICINE

## 2022-08-18 PROCEDURE — 1160F PR REVIEW ALL MEDS BY PRESCRIBER/CLIN PHARMACIST DOCUMENTED: ICD-10-PCS | Mod: CPTII,S$GLB,, | Performed by: FAMILY MEDICINE

## 2022-08-18 PROCEDURE — 3078F DIAST BP <80 MM HG: CPT | Mod: CPTII,S$GLB,, | Performed by: FAMILY MEDICINE

## 2022-08-18 PROCEDURE — 1160F RVW MEDS BY RX/DR IN RCRD: CPT | Mod: CPTII,S$GLB,, | Performed by: FAMILY MEDICINE

## 2022-08-18 PROCEDURE — 99499 RISK ADDL DX/OHS AUDIT: ICD-10-PCS | Mod: S$GLB,,, | Performed by: FAMILY MEDICINE

## 2022-08-18 PROCEDURE — 1101F PR PT FALLS ASSESS DOC 0-1 FALLS W/OUT INJ PAST YR: ICD-10-PCS | Mod: CPTII,S$GLB,, | Performed by: FAMILY MEDICINE

## 2022-08-18 PROCEDURE — 3288F PR FALLS RISK ASSESSMENT DOCUMENTED: ICD-10-PCS | Mod: CPTII,S$GLB,, | Performed by: FAMILY MEDICINE

## 2022-08-18 PROCEDURE — 99215 OFFICE O/P EST HI 40 MIN: CPT | Mod: S$GLB,,, | Performed by: FAMILY MEDICINE

## 2022-08-18 PROCEDURE — 1101F PT FALLS ASSESS-DOCD LE1/YR: CPT | Mod: CPTII,S$GLB,, | Performed by: FAMILY MEDICINE

## 2022-08-18 PROCEDURE — 99999 PR PBB SHADOW E&M-EST. PATIENT-LVL III: CPT | Mod: PBBFAC,,, | Performed by: FAMILY MEDICINE

## 2022-08-18 PROCEDURE — 1159F PR MEDICATION LIST DOCUMENTED IN MEDICAL RECORD: ICD-10-PCS | Mod: CPTII,S$GLB,, | Performed by: FAMILY MEDICINE

## 2022-08-18 PROCEDURE — 3075F PR MOST RECENT SYSTOLIC BLOOD PRESS GE 130-139MM HG: ICD-10-PCS | Mod: CPTII,S$GLB,, | Performed by: FAMILY MEDICINE

## 2022-08-18 PROCEDURE — 99499 UNLISTED E&M SERVICE: CPT | Mod: S$GLB,,, | Performed by: FAMILY MEDICINE

## 2022-08-18 PROCEDURE — 1126F PR PAIN SEVERITY QUANTIFIED, NO PAIN PRESENT: ICD-10-PCS | Mod: CPTII,S$GLB,, | Performed by: FAMILY MEDICINE

## 2022-08-18 PROCEDURE — 99215 PR OFFICE/OUTPT VISIT, EST, LEVL V, 40-54 MIN: ICD-10-PCS | Mod: S$GLB,,, | Performed by: FAMILY MEDICINE

## 2022-08-18 PROCEDURE — 99417 PROLNG OP E/M EACH 15 MIN: CPT | Mod: S$GLB,,, | Performed by: FAMILY MEDICINE

## 2022-08-18 PROCEDURE — 3288F FALL RISK ASSESSMENT DOCD: CPT | Mod: CPTII,S$GLB,, | Performed by: FAMILY MEDICINE

## 2022-08-18 PROCEDURE — 99417 PR PROLONGED SVC, OUTPT, W/WO DIRECT PT CONTACT,  EA ADDTL 15 MIN: ICD-10-PCS | Mod: S$GLB,,, | Performed by: FAMILY MEDICINE

## 2022-08-18 RX ORDER — GUAIFEN/DEXTROMETHORPHAN/PPA
SYRUP ORAL
COMMUNITY
End: 2022-08-18

## 2022-08-18 RX ORDER — UBIQUINOL 100 MG
100 CAPSULE ORAL DAILY
COMMUNITY
End: 2023-12-29

## 2022-08-18 RX ORDER — GABAPENTIN 300 MG/1
300 CAPSULE ORAL 3 TIMES DAILY
COMMUNITY
End: 2024-01-25

## 2022-08-18 RX ORDER — LEVOTHYROXINE SODIUM 88 UG/1
88 TABLET ORAL
COMMUNITY
End: 2022-09-06 | Stop reason: SDUPTHER

## 2022-08-18 RX ORDER — NYSTATIN 100000 [USP'U]/ML
4 SUSPENSION ORAL 4 TIMES DAILY
COMMUNITY
End: 2022-09-21

## 2022-08-18 RX ORDER — HYPROMELLOSE 2910 5 MG/ML
1 SOLUTION OPHTHALMIC
COMMUNITY

## 2022-08-18 RX ORDER — DOXEPIN HYDROCHLORIDE 25 MG/1
25 CAPSULE ORAL NIGHTLY
COMMUNITY
End: 2023-10-12 | Stop reason: SDUPTHER

## 2022-08-18 NOTE — PROGRESS NOTES
Subjective:       Patient ID: Erica Masterson is a 76 y.o. female.    Chief Complaint: Follow-up (cymbalta)    76-year-old female has been having a number of emergency room visits and hospital admissions for symptoms many of which appear on the surface to suggest panic disorder and frequently occurring in the middle of the night awakening her from a sound sleep with tachycardia, shortness of breath, nausea, headaches, tremors, muscle spasms and dizziness.  In most cases when she arrives at the emergency room her symptoms have already resolved or resolve soon after arrival.  Numerous workups have not revealed any significant problem and efforts to control the symptoms have resulted in significant polypharmacy.  Symptoms started after surgery for recurrent or missed thyroid tissue after initial operation for thyroidectomy for thyroid carcinoma.  Her initial operation was in May of 2021, the 2nd procedure was in September of 2021.  She currently has pending a neurology evaluation with Dr. Lovett.  She has been evaluated by Psychiatry and has had cardiology workup as well.  History includes generalized anxiety disorder and depression, peripheral arterial disease, type 2 diabetes with nephropathy and without insulin use, hypertension currently on amlodipine and losartan, hyperlipidemia currently on Crestor 20 mg.  She has stage 3 chronic kidney disease, iron deficiency anemia, thyroid cancer with metastasis, secondary hypothyroidism, diverticulosis, reflux, and colon polyps.  In addition to her thyroidectomy she has had her gallbladder removed, had a hysterectomy with oophorectomy and had a right lung lobectomy in 1966.  Most recently she was seen at [facility] and admitted from August 6 through August 9 with numbness and tingling in the hands and feet and a finding of hypocalcemia.  She did have a mildly low ionized calcium as well as a total calcium.  She was started on calcium supplementation using Tums two tablets 3  times a day.  August 12th the patient presented with similar complaints involving spasms in the hands and feet to the emergency room.  Her symptoms resolved while air and she was not admitted.  She [facility] then [facility] through [facility] emergency room with reflux, thrush, and was started on Carafate.  Remeron 7.5 mg that she had been given previously as a sleep aid was increased to 15 mg but the patient tells me she has not yet started that and she does not appear to have the 7.5 mg.  She is also taking Cymbalta 30 mg daily, Sinequan 25 mg daily, and has been using Prevacid and Pepcid for her GI symptoms which are currently in check and asymptomatic.  She reports she is still having problems sleeping.    The patient has had numerous tests done among which are her most recent A1c August 8th of 6.0, a normal ionized calcium of 1.08 on August 7 following a minor decrease of 0.96 on August 6.  Her TSH has been suppressed as appropriate for thyroid cancer and is followed by Dr. Eubanks.  Phosphorus, magnesium, vitamin-D, B12 folate and other vitamin levels have been normal.  She did have a positive D-dimer of 3.0 on March 21st followed by a normal CTA of the chest.  MRI and MRA of the brain August 8th were negative with a positive small aneurysm which prompted the radiologist to suggest a CTA of the brain for further evaluation.  She has had a nuclear stress test that was normal by both EKG and scanner criteria.     Presently the patient is complaining of awakening with tachycardia, dizziness, tremors, jerking shaking muscle spasms occurring in the hands and feet.  She reports that she generally has no symptoms when she goes to bed at which time she takes her nighttime medicines include the doxepin, Remeron, and Cymbalta along with two of her Tums and her Crestor.  She awakens usually between three and 4:00 p.m., awakened by her symptoms.  Previously family members were taking her to the  emergency room but her  can no longer drive because of eyedrops he has to use at bedtime.  She has been calling an ambulance and often her symptoms will resolve on or before arrival.  She reports that she still feels bad in the morning but improved and the symptoms improve through the day only to recur the following evening.  The pattern strongly suggests reactions to her nocturnal medications.  The immediate response to that is it many of these medications were not being used when she 1st started having the symptoms.    Past Medical History:  3/26/2012: Adenomatous polyp of colon  No date: Allergy  No date: Anxiety  No date: Cancer  No date: Cataract  No date: Colon polyp  04/03/2012: Degenerative arthritis of knee  No date: Diverticulosis  No date: Encounter for blood transfusion  No date: GERD (gastroesophageal reflux disease)  2021: History of thyroid cancer  No date: Hyperlipidemia  No date: Hypertension  5/20/2013: Lateral meniscal tear  03/26/2012: Multinodular goiter  01/18/2010: Myopathy, unspecified  No date: Tuberculosis    Past Surgical History:  2015: BREAST BIOPSY; Left      Comment:  benign  No date: CHOLECYSTECTOMY  12/2/15: COLONOSCOPY      Comment:  Dr. Britton, multiple polyps, recheck five years-three                years if more than 2 polyps are adenomatous  12/2/2015: COLONOSCOPY; N/A  3/20/2019: COLONOSCOPY; N/A      Comment:  Procedure: COLONOSCOPY;  Surgeon: Jose Britton MD;                Location: [facility];  Service: Endoscopy;  Laterality:                N/A;  5/20/2020: COLONOSCOPY; N/A      Comment:  Dr. Britton; internal hemorrhoids; diverticulosis;                polyps removed; repeat in 3 years  8/26/2020: CYSTOSCOPY; N/A      Comment:  Procedure: CYSTOSCOPY;  Surgeon: Charlotte Walters Jr., MD;               Location: [facility] OR;  Service: Urology;  Laterality: N/A;  9/13/2021: DISSECTION OF NECK; Left      Comment:  Procedure: DISSECTION, NECK;  Surgeon: Ryan Torres,                 MD;  [facility];  Service: ENT;                 Laterality: Left;  5/20/2020: ESOPHAGOGASTRODUODENOSCOPY; N/A      Comment:  Dr. Britton; small hiatal hernia; gastritis; 8 gastric                polyps removed  4/1/2022: ESOPHAGOGASTRODUODENOSCOPY; N/A      Comment:  Procedure: EGD (ESOPHAGOGASTRODUODENOSCOPY);  Surgeon:                Jose Britton MD;  Location: [facility] ENDO;  Service:                Endoscopy;  Laterality: N/A;  No date: FOOT SURGERY  No date: HYSTERECTOMY      Comment:  TVH/BSO > 20 years  6/4/2020: INTRALUMINAL GASTROINTESTINAL TRACT IMAGING VIA CAPSULE; N/A      Comment:  Procedure: IMAGING PROCEDURE, GI TRACT, INTRALUMINAL,                VIA CAPSULE;  Surgeon: Jose Britton MD;  Location:                [facility] ENDO;  Service: Endoscopy;  Laterality: N/A;  06/06/2013: KNEE SURGERY      Comment:  right knee tear Dr Iverson   9/17/2020: LAPAROSCOPIC CHOLECYSTECTOMY; N/A      Comment:  Procedure: CHOLECYSTECTOMY, LAPAROSCOPIC;  Surgeon:                Forrest Veronica MD;  Location: [facility];  Service:                General;  Laterality: N/A;  1966: LUNG LOBECTOMY      Comment:  right middle lobectomy, due to Tb  No date: OOPHORECTOMY  No date: SHOULDER SURGERY      Comment:  francis   5/19/2021: THYROIDECTOMY; Bilateral      Comment:  Procedure: THYROIDECTOMY;  Surgeon: Jamia Amezquita MD;  Location: [facility] OR [address] [place] [address];  Service: General;                 Laterality: Bilateral;  9/13/2021: THYROIDECTOMY; Bilateral      Comment:  Procedure: THYROIDECTOMY WITH INTRA-OP PTH;  Surgeon:                Ryan Torres MD;  [facility];  Service:               ENT;  Laterality: Bilateral;  NIM tube  Intraop PTH    Current Outpatient Medications on File Prior to Visit:  amLODIPine (NORVASC) 2.5 MG tablet, Take 1 tablet by mouth once daily, Disp: 90 tablet, Rfl: 0  baclofen (LIORESAL) 10 MG tablet, Take 1 tablet (10 mg total) by mouth 3 (three)  times daily. Start w. Half a tab first 7 days, Disp: 90 tablet, Rfl: 6  blood sugar diagnostic (BLOOD GLUCOSE TEST) Strp, True Metrix glucose strips check once daily, Disp: 100 each, Rfl: 3  calcium carbonate (TUMS) 200 mg calcium (500 mg) chewable tablet, Chew and swallow 2 tablets (1,000 mg total) by mouth 3 (three) times daily. for 14 days, Disp: 100 tablet, Rfl: 0  conjugated estrogens (PREMARIN) vaginal cream, Place 0.5 g vaginally once daily AND 0.5 g twice a week. (Patient taking differently: Place 0.5 g vaginally once daily AND 0.5 g twice a week. Saturdays and tuesdays), Disp: 30 g, Rfl: 7  coQ10, ubiquinol, 100 mg Cap, Take 100 mg by mouth once daily., Disp: , Rfl:   doxepin (SINEQUAN) 25 MG capsule, Take 25 mg by mouth every evening., Disp: , Rfl:   DULoxetine (CYMBALTA) 30 MG capsule, Take 1 capsule (30 mg total) by mouth once daily., Disp: 30 capsule, Rfl: 11  ergocalciferol (ERGOCALCIFEROL) 50,000 unit Cap, Take 1 capsule (50,000 Units total) by mouth every 30 days., Disp: 3 capsule, Rfl: 3  famotidine (PEPCID) 20 MG tablet, Take 1 tablet (20 mg total) by mouth 2 (two) times daily., Disp: 30 tablet, Rfl: 0  gabapentin (NEURONTIN) 300 MG capsule, Take 300 mg by mouth 3 (three) times daily., Disp: , Rfl:   levothyroxine (EUTHYROX) 88 MCG tablet, Take 88 mcg by mouth before breakfast., Disp: , Rfl:   losartan (COZAAR) 100 MG tablet, Take 1 tablet by mouth once daily, Disp: 90 tablet, Rfl: 1  nystatin (MYCOSTATIN) 100,000 unit/mL suspension, Take 4 mLs by mouth 4 (four) times daily., Disp: , Rfl:   rosuvastatin (CRESTOR) 20 MG tablet, Take 0.5 tablets (10 mg total) by mouth every evening., Disp: 90 tablet, Rfl: 0  (DISCONTINUED) miconazole nitrate (MICONAZOLE-3) 200 mg- 2 % (9 gram) Kit, Place vaginally., Disp: , Rfl:   (DISCONTINUED) polyethylene glycol (GLYCOLAX) 17 gram PwPk, Take 17 g by mouth once daily., Disp: 30 each, Rfl: 0  (DISCONTINUED) simethicone (MYLICON) 125 MG chewable tablet, Take 125 mg  by mouth every 6 (six) hours as needed for Flatulence., Disp: , Rfl:   artificial tears (ISOPTO TEARS) 0.5 % ophthalmic solution, 1 drop as needed., Disp: , Rfl:   blood-glucose meter kit, True Metrix glucometer check glucose once daily, Disp: 1 each, Rfl: 0  lansoprazole (PREVACID) 30 MG capsule, Take 30 mg by mouth once daily., Disp: , Rfl:   mirtazapine (REMERON) 15 MG tablet, Take 1 tablet (15 mg total) by mouth every evening. (Patient not taking: Reported on 8/18/2022), Disp: 30 tablet, Rfl: 0  (DISCONTINUED) carboxymethylcellulose sodium (REFRESH OPHT), Apply 1 drop to eye daily as needed., Disp: , Rfl:   (DISCONTINUED) gabapentin (NEURONTIN) 300 MG capsule, Take 1 capsule (300 mg total) by mouth 2 (two) times daily. (Patient taking differently: Take 300 mg by mouth 3 (three) times daily.), Disp: 90 capsule, Rfl: 0  (DISCONTINUED) LIDOcaine (LIDODERM) 5 %, Place 1 patch onto the skin once daily. Remove & Discard patch within 12 hours or as directed by MD  Apply to area of pain left shoulder (Patient not taking: Reported on 8/18/2022), Disp: 30 patch, Rfl: 0  (DISCONTINUED) pantoprazole (PROTONIX) 40 MG tablet, Take 1 tablet (40 mg total) by mouth once daily., Disp: 30 tablet, Rfl: 2  (DISCONTINUED) sucralfate (CARAFATE) 100 mg/mL suspension, Take 10 mLs (1 g total) by mouth every 6 (six) hours. (Patient not taking: Reported on 8/18/2022), Disp: 1200 mL, Rfl: 0    No current facility-administered medications on file prior to visit.        Review of Systems   Constitutional: Negative for chills and fever.   Respiratory: Negative for cough and shortness of breath.    Cardiovascular: Positive for chest pain and palpitations.   Neurological: Positive for dizziness, tremors, weakness, light-headedness, numbness and headaches. Negative for seizures, syncope, facial asymmetry and speech difficulty.   Psychiatric/Behavioral: Positive for sleep disturbance. The patient is nervous/anxious.        Objective:      Physical  Exam  Vitals and nursing note reviewed.   Constitutional:       General: She is not in acute distress.     Appearance: Normal appearance. She is normal weight. She is not ill-appearing, toxic-appearing or diaphoretic.      Comments: Good blood pressure control   Normal pulse with regular rhythm  Normal weight with a BMI of 24.1.  She is down 0.8 lb from her last visit with me June 6, 2022.   Cardiovascular:      Rate and Rhythm: Normal rate and regular rhythm.      Heart sounds: Normal heart sounds.   Pulmonary:      Effort: Pulmonary effort is normal.      Breath sounds: Normal breath sounds.   Neurological:      General: No focal deficit present.      Mental Status: She is alert and oriented to person, place, and time.   Psychiatric:         Attention and Perception: Attention normal.         Mood and Affect: Mood normal.         Speech: Speech normal.         Behavior: Behavior normal. Behavior is cooperative.         Thought Content: Thought content normal.         Cognition and Memory: Cognition normal.         Judgment: Judgment normal.         Assessment:       1. Dizzy    2. Tachycardia    3. Coarse tremors    4. Weakness    5. Spasms of the hands or feet    6. Nausea    7. Nonintractable headache, unspecified chronicity pattern, unspecified headache type    8. Polypharmacy    9. RONNIE (generalized anxiety disorder), 2019    10. Hypertension associated with diabetes    11. Hyperlipidemia associated with type 2 diabetes mellitus, baseline     12. Thyroid cancer    13. Metastasis from thyroid cancer    14. Postoperative hypothyroidism        Plan:       1. Dizzy  Currently asymptomatic, episodic and tends to occur after nocturnal medications wearing off into the next day.    2. Tachycardia  Negative cardiac workup, see above    3. Coarse tremors  No symptoms present at this time    4. Weakness  No symptoms present at this time    5. Spasms of the hands or feet  No symptoms present at this time    6.  Nausea  Asymptomatic at this time, possibly resolved by her high-dose calcium intake    7. Nonintractable headache, unspecified chronicity pattern, unspecified headache type  Consideration to the MRA of the brain with suggestion of small aneurysms not well visualized.  Radiology suggest a CTA may give better information.  The patient has an upcoming appointment with Neurology    8. Polypharmacy  The patient has a large bag of pills that she is carrying around.  Some of them she is no longer taking, many of them she is, and she is having difficulty remembering which is which.    She is going to discontinue for the time being the Crestor which could be causing her muscle related symptoms, the doxepin, Remeron, and Cymbalta.  She is also going to hold the Prevacid and Pepcid and has not been taking the Carafate.  She was told she may use melatonin as a sleep aid if necessary.    9. RONNIE (generalized anxiety disorder), 2019  This diagnosis pre dates on set of her symptoms currently    10. Hypertension associated with diabetes  Well controlled, no changes needed in the Norvasc and losartan    11. Hyperlipidemia associated with type 2 diabetes mellitus, baseline   Lab Results   Component Value Date    CHOL 154 08/08/2022    CHOL 177 04/06/2022    CHOL 163 10/30/2020     Lab Results   Component Value Date    HDL 59 08/08/2022    HDL 70 04/06/2022    HDL 63 10/30/2020     Lab Results   Component Value Date    LDLCALC 86.4 08/08/2022    LDLCALC 97.4 04/06/2022    LDLCALC 91.6 10/30/2020     Lab Results   Component Value Date    TRIG 43 08/08/2022    TRIG 48 04/06/2022    TRIG 42 10/30/2020     Lab Results   Component Value Date    CHOLHDL 38.3 08/08/2022    CHOLHDL 39.5 04/06/2022    CHOLHDL 38.7 10/30/2020     Well controlled    12. Thyroid cancer  Status post thyroidectomy May 2021 with removal of additional cancerous tissue in September 2021    13. Metastasis from thyroid cancer  See above    14. Postoperative  hypothyroidism  Lab Results   Component Value Date    TSH 0.140 (L) 08/16/2022     Followed by Dr. Eubanks, TSH does need to be suppressed with her cancer diagnosis which does put her at risk of hyperthyroidism in addition to all of her other potential problems    I spent 104 minutes on this encounter.  This time includes face-to-face time, orders, extensive chart review, test review, and documentation.

## 2022-08-18 NOTE — TELEPHONE ENCOUNTER
----- Message from Jessica Garcia sent at 8/18/2022 11:43 AM CDT -----  Contact: dina  Type: Needs Medical Advice  Who Called:  Dina with [facility]  Best Call Back Number: [phone]  Additional Information: wanted to let dr know she was on home health and was seen in hospital for chest pains. When she was sent home, she refused to continue with home health services.

## 2022-08-18 NOTE — TELEPHONE ENCOUNTER
----- Message from Micheal Lambert sent at 8/18/2022 11:43 AM CDT -----  Contact: Patient  Type:  Patient Call          Who Called:Patient         Does the patient know what this is regarding?:requesting a call back to schedule appt ;please advise           Would the patient rather a call back or a response via [facility]? Call           Best Call Back Number:[phone]             Additional Information:

## 2022-08-18 NOTE — TELEPHONE ENCOUNTER
Spoke to patient and recommended warm compresses. Recommend using warm rice in a sock vs a warm cloth. Explained the rice will hold heat longer and to make sure she is really compressing. Also explained the importance of keeping the lash margins clean, recommended OTC lid scrubs or ashlee's baby shampoo daily to keep lashes clean to prevent new styes.     Explained that unfortunately, styes can take time to go away.     Patient understood and would try warm compresses and lid hygiene.       -TD   ----- Message from Lisa Motta sent at 8/18/2022  9:47 AM CDT -----  Contact: pt at [phone] or [phone]  Type:  Sooner Appointment Request    Caller is requesting a sooner appointment.  Caller declined first available appointment listed below.  Caller will not accept being placed on the waitlist and is requesting a message be sent to doctor.    Name of Caller:  pt   When is the first available appointment?  10/14/2022  Symptoms:  pimple with infestation on left eye very large   Best Call Back Number:  [phone] or [phone]    Additional Information:  pt called in to get a sooner appointment to get this checked out

## 2022-08-19 ENCOUNTER — TELEPHONE (OUTPATIENT)
Dept: FAMILY MEDICINE | Facility: CLINIC | Age: 77
End: 2022-08-19
Payer: MEDICARE

## 2022-08-19 NOTE — TELEPHONE ENCOUNTER
See telephone message today, patient came into the office with her  for his appointment and we discussed it

## 2022-08-19 NOTE — TELEPHONE ENCOUNTER
Patient came in with her  for his appointment.  She tells me that last night she took the melatonin and gabapentin and slept well up until early morning hours when she awakened with pain in her left shoulder and neck associated with some tingling burning and pins and needles feelings.  She got up out of bed and move to her sofa till the sensations stopped.  She fell asleep again and woke up with the same sensation.  Symptoms somewhat suggestive of neuropathy and possibly a C-spine problem.  Recommended that the patient increase gabapentin to one b.i.d. and two at bedtime starting tonight, she may continue to use the melatonin.

## 2022-08-19 NOTE — TELEPHONE ENCOUNTER
She did not need home health, I do not believe she was qualified for.  She was in no way home bound.  I agree with her decision.

## 2022-08-19 NOTE — TELEPHONE ENCOUNTER
----- Message from William Alvarado sent at 8/19/2022 11:13 AM CDT -----  Type: Patient Call Back         Who called:Pt          What is the request in detail:  Pt is called in regarding muscle spasms on  more on left if body . Pt states that it happed three time on last night . Pt took a tylenol at 10 :30 am          Can the clinic reply by [facility]?no          Would the patient rather a call back or a response via [facility]?call back          Best call back number: (mobile) [phone]         Additional Information:           Thank You

## 2022-08-19 NOTE — TELEPHONE ENCOUNTER
"Patient requesting to send a message to Dr. Myers for notification that on last night she had 3 additional episodes of muscle spasms and tremors to left leg and shoulder. Patient states the muscle spasms woke her up, states she was able to massage the area which caused symptoms to "ease up."  States she wanted to update Dr. Myers that the symptoms are still occurring.   "

## 2022-08-22 ENCOUNTER — HOSPITAL ENCOUNTER (OUTPATIENT)
Facility: HOSPITAL | Age: 77
Discharge: HOME | End: 2022-08-22
Attending: INTERNAL MEDICINE | Admitting: INTERNAL MEDICINE
Payer: MEDICARE

## 2022-08-22 VITALS
TEMPERATURE: 98 F | SYSTOLIC BLOOD PRESSURE: 127 MMHG | HEIGHT: 65 IN | WEIGHT: 150 LBS | BODY MASS INDEX: 24.99 KG/M2 | DIASTOLIC BLOOD PRESSURE: 59 MMHG | HEART RATE: 15 BPM | OXYGEN SATURATION: 98 % | RESPIRATION RATE: 78 BRPM

## 2022-08-22 DIAGNOSIS — R13.10 DYSPHAGIA: ICD-10-CM

## 2022-08-22 PROCEDURE — 25000003 PHARM REV CODE 250: Performed by: INTERNAL MEDICINE

## 2022-08-22 PROCEDURE — 45385 COLONOSCOPY W/LESION REMOVAL: CPT | Performed by: INTERNAL MEDICINE

## 2022-08-22 RX ORDER — SODIUM CHLORIDE 9 MG/ML
INJECTION, SOLUTION INTRAVENOUS CONTINUOUS
Status: DISCONTINUED | OUTPATIENT
Start: 2022-08-22 | End: 2022-08-22 | Stop reason: HOSPADM

## 2022-08-22 RX ORDER — LIDOCAINE HYDROCHLORIDE 20 MG/ML
JELLY TOPICAL ONCE
Status: COMPLETED | OUTPATIENT
Start: 2022-08-22 | End: 2022-08-22

## 2022-08-22 RX ADMIN — LIDOCAINE HYDROCHLORIDE 10 ML: 20 JELLY TOPICAL at 09:08

## 2022-08-22 NOTE — PLAN OF CARE
Pt tolerated procedure well. Full protocol followed with 200cc NS bolus. Pt verbalized understanding of AVS

## 2022-08-24 ENCOUNTER — DOCUMENT SCAN (OUTPATIENT)
Dept: HOME HEALTH SERVICES | Facility: HOSPITAL | Age: 77
End: 2022-08-24
Payer: MEDICARE

## 2022-08-24 ENCOUNTER — TELEPHONE (OUTPATIENT)
Dept: NEUROLOGY | Facility: CLINIC | Age: 77
End: 2022-08-24
Payer: MEDICARE

## 2022-08-24 NOTE — TELEPHONE ENCOUNTER
----- Message from Tim Mckeon sent at 8/24/2022  3:03 PM CDT -----  Type:  Sooner Appointment Request    Caller is requesting a sooner appointment.  Caller declined first available appointment listed below.  Caller will not accept being placed on the waitlist and is requesting a message be sent to doctor.    Name of Caller:  Patient  When is the first available appointment? Pt needs to reschedule from 08/25/22  Symptoms:  Follow Up    Best Call Back Number: [phone]  Additional Information:

## 2022-08-25 ENCOUNTER — TELEPHONE (OUTPATIENT)
Dept: FAMILY MEDICINE | Facility: CLINIC | Age: 77
End: 2022-08-25
Payer: MEDICARE

## 2022-08-25 ENCOUNTER — OFFICE VISIT (OUTPATIENT)
Dept: PHYSICAL MEDICINE AND REHAB | Facility: CLINIC | Age: 77
End: 2022-08-25
Payer: MEDICARE

## 2022-08-25 VITALS
HEIGHT: 65 IN | SYSTOLIC BLOOD PRESSURE: 114 MMHG | BODY MASS INDEX: 24.49 KG/M2 | DIASTOLIC BLOOD PRESSURE: 65 MMHG | HEART RATE: 87 BPM | WEIGHT: 147 LBS

## 2022-08-25 DIAGNOSIS — Z12.31 BREAST CANCER SCREENING BY MAMMOGRAM: Primary | ICD-10-CM

## 2022-08-25 DIAGNOSIS — G24.3 CERVICAL DYSTONIA: Primary | ICD-10-CM

## 2022-08-25 PROCEDURE — 64616 CHEMODENERV MUSC NECK DYSTON: CPT | Mod: 50,S$GLB,, | Performed by: PHYSICAL MEDICINE & REHABILITATION

## 2022-08-25 PROCEDURE — 99499 NO LOS: ICD-10-PCS | Mod: S$GLB,,, | Performed by: PHYSICAL MEDICINE & REHABILITATION

## 2022-08-25 PROCEDURE — 1101F PT FALLS ASSESS-DOCD LE1/YR: CPT | Mod: CPTII,S$GLB,, | Performed by: PHYSICAL MEDICINE & REHABILITATION

## 2022-08-25 PROCEDURE — 3288F PR FALLS RISK ASSESSMENT DOCUMENTED: ICD-10-PCS | Mod: CPTII,S$GLB,, | Performed by: PHYSICAL MEDICINE & REHABILITATION

## 2022-08-25 PROCEDURE — 3074F SYST BP LT 130 MM HG: CPT | Mod: CPTII,S$GLB,, | Performed by: PHYSICAL MEDICINE & REHABILITATION

## 2022-08-25 PROCEDURE — 3288F FALL RISK ASSESSMENT DOCD: CPT | Mod: CPTII,S$GLB,, | Performed by: PHYSICAL MEDICINE & REHABILITATION

## 2022-08-25 PROCEDURE — 1125F AMNT PAIN NOTED PAIN PRSNT: CPT | Mod: CPTII,S$GLB,, | Performed by: PHYSICAL MEDICINE & REHABILITATION

## 2022-08-25 PROCEDURE — 3074F PR MOST RECENT SYSTOLIC BLOOD PRESSURE < 130 MM HG: ICD-10-PCS | Mod: CPTII,S$GLB,, | Performed by: PHYSICAL MEDICINE & REHABILITATION

## 2022-08-25 PROCEDURE — 1125F PR PAIN SEVERITY QUANTIFIED, PAIN PRESENT: ICD-10-PCS | Mod: CPTII,S$GLB,, | Performed by: PHYSICAL MEDICINE & REHABILITATION

## 2022-08-25 PROCEDURE — 1101F PR PT FALLS ASSESS DOC 0-1 FALLS W/OUT INJ PAST YR: ICD-10-PCS | Mod: CPTII,S$GLB,, | Performed by: PHYSICAL MEDICINE & REHABILITATION

## 2022-08-25 PROCEDURE — 3078F DIAST BP <80 MM HG: CPT | Mod: CPTII,S$GLB,, | Performed by: PHYSICAL MEDICINE & REHABILITATION

## 2022-08-25 PROCEDURE — 3078F PR MOST RECENT DIASTOLIC BLOOD PRESSURE < 80 MM HG: ICD-10-PCS | Mod: CPTII,S$GLB,, | Performed by: PHYSICAL MEDICINE & REHABILITATION

## 2022-08-25 PROCEDURE — 99999 PR PBB SHADOW E&M-EST. PATIENT-LVL III: CPT | Mod: PBBFAC,,, | Performed by: PHYSICAL MEDICINE & REHABILITATION

## 2022-08-25 PROCEDURE — 99499 UNLISTED E&M SERVICE: CPT | Mod: S$GLB,,, | Performed by: PHYSICAL MEDICINE & REHABILITATION

## 2022-08-25 PROCEDURE — 64616 PR CHEMODENERVATION NECK MUSCLES EXC LARNYNX, UNI: ICD-10-PCS | Mod: 50,S$GLB,, | Performed by: PHYSICAL MEDICINE & REHABILITATION

## 2022-08-25 PROCEDURE — 99999 PR PBB SHADOW E&M-EST. PATIENT-LVL III: ICD-10-PCS | Mod: PBBFAC,,, | Performed by: PHYSICAL MEDICINE & REHABILITATION

## 2022-08-25 NOTE — TELEPHONE ENCOUNTER
Diagnostic Mammogram performed on 9-2-21. Results indicate no evidence of malignancy; and recommend follow up screening mammogram in 1 year. Will insert copy of this recommendation from 9-2-21 mammogram for reference:      Mammography Recommendations     Side Due   Routine Screening Mammogram in 1 year  9/2/2022     Screening mammogram order placed. Appointment scheduled for the date of 9-13-22. Patient agreed to appointment date, time, and location.

## 2022-08-25 NOTE — PROGRESS NOTES
Patient with cervical dystonia who presents to clinic today for Botulinum toxin type-A injections.   The following muscle groups were injected :       Muscle(s) Units of Botulinum Toxin A Injected    R- SPLENIUS CAP 15 Units   L- SPLENIUS CAP 15 Units   R- TRAPEZIUS 50 units   L-TRAPEZIUs A 100 Units  L- lev scap 20 U    Units Used: 200 Units   Units Wasted: 0 Units          Injections performed after sterile prep w. chlorohexedine. Risk and benefits reviewed.Verbal consent obtained. No complications.

## 2022-08-25 NOTE — TELEPHONE ENCOUNTER
----- Message from Constance Garcia sent at 8/25/2022  9:40 AM CDT -----  Regarding: pt called  Name of Who is Calling: TODD RODRIGUEZ [[id number]]      What is the request in detail: pt is requesting her mammogram orders be placed. Please advise       Can the clinic reply by [facility]: No      What Number to Call Back if not in [facility]: [phone]

## 2022-08-29 ENCOUNTER — TELEPHONE (OUTPATIENT)
Dept: GASTROENTEROLOGY | Facility: CLINIC | Age: 77
End: 2022-08-29
Payer: MEDICARE

## 2022-08-29 RX ORDER — FAMOTIDINE 20 MG/1
20 TABLET, FILM COATED ORAL 2 TIMES DAILY
Qty: 180 TABLET | Refills: 3 | Status: SHIPPED | OUTPATIENT
Start: 2022-08-29 | End: 2022-09-08

## 2022-08-29 NOTE — TELEPHONE ENCOUNTER
----- Message from Janny Hoff sent at 8/29/2022  8:16 AM CDT -----  Type: Needs Medical Advice  Who Called:  pt  Symptoms (please be specific):  said said she wanted to know if the dr will call in famotidine (PEPCID) 20 MG tablet for her she was having problems  with the lansoprazole (PREVACID) 30 MG capsule and she said it's been a week that she have not taken anything--please call her and advise--   Best Call Back Number: [phone]    Additional Information: please advise--thank you

## 2022-08-29 NOTE — TELEPHONE ENCOUNTER
Returned call to patient to clarify her issues with her medication. Patient states that she is having issues with the Prevacid and Dr. Myers has taken her off of it. Patient states that she would like Dr. Britton's okay resume her Pepcid and start her treatment for Nystatin for oral thrush.

## 2022-08-30 ENCOUNTER — TELEPHONE (OUTPATIENT)
Dept: NEUROLOGY | Facility: CLINIC | Age: 77
End: 2022-08-30
Payer: MEDICARE

## 2022-08-30 ENCOUNTER — DOCUMENT SCAN (OUTPATIENT)
Dept: HOME HEALTH SERVICES | Facility: HOSPITAL | Age: 77
End: 2022-08-30
Payer: MEDICARE

## 2022-08-30 ENCOUNTER — LAB VISIT (OUTPATIENT)
Dept: LAB | Facility: HOSPITAL | Age: 77
End: 2022-08-30
Attending: INTERNAL MEDICINE
Payer: MEDICARE

## 2022-08-30 DIAGNOSIS — R42 DIZZINESS: ICD-10-CM

## 2022-08-30 DIAGNOSIS — R25.1 TREMOR: ICD-10-CM

## 2022-08-30 DIAGNOSIS — G25.9 EXTRAPYRAMIDAL AND MOVEMENT DISORDER, UNSPECIFIED: ICD-10-CM

## 2022-08-30 LAB
ALBUMIN SERPL BCP-MCNC: 3.6 G/DL (ref 3.5–5.2)
ALP SERPL-CCNC: 42 U/L (ref 55–135)
ALT SERPL W/O P-5'-P-CCNC: 13 U/L (ref 10–44)
ANION GAP SERPL CALC-SCNC: 8 MMOL/L (ref 8–16)
AST SERPL-CCNC: 17 U/L (ref 10–40)
BILIRUB SERPL-MCNC: 0.5 MG/DL (ref 0.1–1)
BUN SERPL-MCNC: 11 MG/DL (ref 8–23)
CALCIUM SERPL-MCNC: 8.8 MG/DL (ref 8.7–10.5)
CHLORIDE SERPL-SCNC: 104 MMOL/L (ref 95–110)
CO2 SERPL-SCNC: 28 MMOL/L (ref 23–29)
CREAT SERPL-MCNC: 0.9 MG/DL (ref 0.5–1.4)
EST. GFR  (NO RACE VARIABLE): >60 ML/MIN/1.73 M^2
FOLATE SERPL-MCNC: 13.5 NG/ML (ref 4–24)
GLUCOSE SERPL-MCNC: 92 MG/DL (ref 70–110)
MAGNESIUM SERPL-MCNC: 1.9 MG/DL (ref 1.6–2.6)
POTASSIUM SERPL-SCNC: 3.9 MMOL/L (ref 3.5–5.1)
PROT SERPL-MCNC: 6.7 G/DL (ref 6–8.4)
SODIUM SERPL-SCNC: 140 MMOL/L (ref 136–145)
VIT B12 SERPL-MCNC: 512 PG/ML (ref 210–950)

## 2022-08-30 PROCEDURE — 80053 COMPREHEN METABOLIC PANEL: CPT | Performed by: INTERNAL MEDICINE

## 2022-08-30 PROCEDURE — 82607 VITAMIN B-12: CPT | Performed by: INTERNAL MEDICINE

## 2022-08-30 PROCEDURE — 82746 ASSAY OF FOLIC ACID SERUM: CPT | Performed by: INTERNAL MEDICINE

## 2022-08-30 PROCEDURE — 83735 ASSAY OF MAGNESIUM: CPT | Performed by: INTERNAL MEDICINE

## 2022-08-30 PROCEDURE — 36415 COLL VENOUS BLD VENIPUNCTURE: CPT | Mod: PO | Performed by: INTERNAL MEDICINE

## 2022-08-30 NOTE — TELEPHONE ENCOUNTER
Was able to reach the pt's sister, Kristina to let her know that Dr. Lovett is out sick.    Also spoke with the pt's , they received the message that Dr. Lovett is out sick this morning.

## 2022-08-30 NOTE — TELEPHONE ENCOUNTER
Spoke to pt--advised that 10/31/22 is the next available appt for Dr. Lovett. Advised that we can add her to the cancellation list. Pt v/u.

## 2022-08-30 NOTE — TELEPHONE ENCOUNTER
----- Message from William Alvarado sent at 8/30/2022  1:22 PM CDT -----  Type:  Patient Returning Call    Who Called: Pt     Who Left Message for Patient:Lanie Odom LPN    Does the patient know what this is regarding?:yes     Would the patient rather a call back or a response via [employer]? Call back     Best Call Back Number:[phone] (mobile)     Additional Information: pt states that she received the massage that Dr Lovett will not be in on today  pt took the appointment for 10/31/22 and wanting to know if theres a sooner appointment

## 2022-08-30 NOTE — TELEPHONE ENCOUNTER
Left message to call in regards to rescheduling the appt for today.  Dr. Lovett is out sick. Message left on home and mobile phone numbers.  Message sent to portal.

## 2022-09-01 ENCOUNTER — HOSPITAL ENCOUNTER (OUTPATIENT)
Dept: RADIOLOGY | Facility: HOSPITAL | Age: 77
Discharge: HOME | End: 2022-09-01
Attending: INTERNAL MEDICINE
Payer: MEDICARE

## 2022-09-01 ENCOUNTER — TELEPHONE (OUTPATIENT)
Dept: PHYSICAL MEDICINE AND REHAB | Facility: CLINIC | Age: 77
End: 2022-09-01
Payer: MEDICARE

## 2022-09-01 DIAGNOSIS — C73 MALIGNANT NEOPLASM OF THYROID GLAND: ICD-10-CM

## 2022-09-01 PROCEDURE — 76536 US EXAM OF HEAD AND NECK: CPT | Mod: 26,,, | Performed by: RADIOLOGY

## 2022-09-01 PROCEDURE — 76536 US SOFT TISSUE HEAD NECK THYROID: ICD-10-PCS | Mod: 26,,, | Performed by: RADIOLOGY

## 2022-09-01 PROCEDURE — 76536 US EXAM OF HEAD AND NECK: CPT | Mod: TC

## 2022-09-01 NOTE — TELEPHONE ENCOUNTER
Returned call to patient who is reporting tightness from area of Botox injection around neck. Advised would forward to provider for review and response. Verbalizes understanding.

## 2022-09-01 NOTE — TELEPHONE ENCOUNTER
----- Message from Janny Hoff sent at 9/1/2022 12:24 PM CDT -----  Type: Needs Medical Advice  Who Called:  pt  Symptoms (please be specific):  pt said she need the nurse to call her and tell her the side affect of the injection she received on her last visit 8/25--she alos need to know when should she start see results-- and what is she to expect on the next visit--please call and advise--  Best Call Back Number: [phone]     Additional Information: please advise--thank you

## 2022-09-02 ENCOUNTER — TELEPHONE (OUTPATIENT)
Dept: PHYSICAL MEDICINE AND REHAB | Facility: CLINIC | Age: 77
End: 2022-09-02
Payer: MEDICARE

## 2022-09-02 ENCOUNTER — TELEPHONE (OUTPATIENT)
Dept: OBSTETRICS AND GYNECOLOGY | Facility: CLINIC | Age: 77
End: 2022-09-02
Payer: MEDICARE

## 2022-09-02 PROCEDURE — 91037 ESOPH IMPED FUNCTION TEST: CPT | Mod: 26,,, | Performed by: INTERNAL MEDICINE

## 2022-09-02 PROCEDURE — 91010 ESOPHAGUS MOTILITY STUDY: CPT | Mod: 26,,, | Performed by: INTERNAL MEDICINE

## 2022-09-02 PROCEDURE — 91010 PR ESOPHAGEAL MOTILITY STUDY, MA2METRY: ICD-10-PCS | Mod: 26,,, | Performed by: INTERNAL MEDICINE

## 2022-09-02 PROCEDURE — 91037 PR GERD TST W/ NASAL IMPEDENCE ELECTROD: ICD-10-PCS | Mod: 26,,, | Performed by: INTERNAL MEDICINE

## 2022-09-02 RX ORDER — METHYLPREDNISOLONE 4 MG/1
TABLET ORAL
Qty: 21 EACH | Refills: 0 | Status: SHIPPED | OUTPATIENT
Start: 2022-09-02 | End: 2022-09-08

## 2022-09-02 NOTE — TELEPHONE ENCOUNTER
Returned call to patient to advise per provider medrol dose pack sent to help with flare response to Botox,and may also use soft collar for support, verbalizes understanding.

## 2022-09-02 NOTE — TELEPHONE ENCOUNTER
Sounds like she is getting a bad flare up  I will send in a medrol dose pack  She should also use a soft cervical collar until her pain resolves  Pls notify

## 2022-09-02 NOTE — TELEPHONE ENCOUNTER
----- Message from Narciso Fontana sent at 9/2/2022  4:01 PM CDT -----  Contact: self  Type: Needs Medical Advice  Who Called: patient   Best Call Back Number: [phone]  Additional Information: Pt wants to know what medication that was sent in is for and also wants to be medically advised how to take it. Thanks. Plz call out to pt. Thanks

## 2022-09-06 DIAGNOSIS — E04.1 THYROID NODULE: Primary | ICD-10-CM

## 2022-09-06 RX ORDER — LEVOTHYROXINE SODIUM 88 UG/1
88 TABLET ORAL
Qty: 90 TABLET | Refills: 3 | Status: SHIPPED | OUTPATIENT
Start: 2022-09-06 | End: 2022-10-20 | Stop reason: SDUPTHER

## 2022-09-06 NOTE — TELEPHONE ENCOUNTER
----- Message from Janny Hoff sent at 9/6/2022  8:22 AM CDT -----  Type: Needs Medical Advice  Who Called:  pt  Symptoms (please be specific):  pt said she need her thyroid meds refilled said she on her last one--she did not know the name of the meds--said the dr or nurse will--please call her--  Pharmacy name and phone #:    [id number] - [place] - [address]  [place] [address]  Phone: [phone] Fax: [phone]      Best Call Back Number: [phone]    Additional Information: please advise--thank you

## 2022-09-06 NOTE — TELEPHONE ENCOUNTER
----- Message from Janny Hoff sent at 9/6/2022  8:22 AM CDT -----  Type: Needs Medical Advice  Who Called:  pt  Symptoms (please be specific):  pt said she need her thyroid meds refilled said she on her last one--she did not know the name of the meds--said the dr or nurse will--please call her--  Pharmacy name and phone #:    [id number] - [place] - [address]  [place] [address]  Phone: [phone] Fax: [phone]      Best Call Back Number: [phone]    Additional Information: please advise--thank you

## 2022-09-07 ENCOUNTER — TELEPHONE (OUTPATIENT)
Dept: GASTROENTEROLOGY | Facility: CLINIC | Age: 77
End: 2022-09-07
Payer: MEDICARE

## 2022-09-07 ENCOUNTER — TELEPHONE (OUTPATIENT)
Dept: PHYSICAL MEDICINE AND REHAB | Facility: CLINIC | Age: 77
End: 2022-09-07
Payer: MEDICARE

## 2022-09-07 ENCOUNTER — TELEPHONE (OUTPATIENT)
Dept: OBSTETRICS AND GYNECOLOGY | Facility: CLINIC | Age: 77
End: 2022-09-07
Payer: MEDICARE

## 2022-09-07 DIAGNOSIS — R14.0 BLOATING: Primary | ICD-10-CM

## 2022-09-07 NOTE — TELEPHONE ENCOUNTER
Called patient to let know needs to collect stool specimen., patient verbally understood. Will submit a stool test on 9/21.

## 2022-09-07 NOTE — TELEPHONE ENCOUNTER
Needs to go to PCP or urgent care sounds like she has a URI or sinus infection b/c she is describing mucous.  Also, might be a good idea to test for covid.  This will have to be handled by primary or urgent care  Botox does not cause those symptoms. It also takes 3 wks to start to work

## 2022-09-07 NOTE — TELEPHONE ENCOUNTER
Talked to patient gave test results patient verbally understood, let know to follow up with Dr Britton, she has upcoming appointment with him, but is still having gas and bloating, pepcid and protonix not able to take , wants to know if there is anything else you can give to her to help? Please advise?

## 2022-09-07 NOTE — TELEPHONE ENCOUNTER
----- Message from Deja Mendoza NP sent at 9/6/2022  8:43 AM CDT -----  Please call to inform & review the results with the patient - let the patient know per Dr. Mcguire esophageal manometry was normal. Continue with previous recommendations. If no improvement in symptoms or symptoms worsen, call/follow-up at clinic WITH DR. POWELL or go to ER. Please call with any questions/concerns.  Thank you,  TESSY Mccann, FNP-C

## 2022-09-07 NOTE — TELEPHONE ENCOUNTER
Contacted pt to inform her Dr. Beach will not be available tomorrow and we have to reschedule her appt. Moved pt's appt to 9/14 at 9:40 am for her 6 month follow up. Pt voiced understanding.

## 2022-09-08 ENCOUNTER — OFFICE VISIT (OUTPATIENT)
Dept: FAMILY MEDICINE | Facility: CLINIC | Age: 77
End: 2022-09-08
Attending: FAMILY MEDICINE
Payer: MEDICARE

## 2022-09-08 VITALS
OXYGEN SATURATION: 98 % | TEMPERATURE: 98 F | BODY MASS INDEX: 24.82 KG/M2 | HEART RATE: 76 BPM | RESPIRATION RATE: 18 BRPM | DIASTOLIC BLOOD PRESSURE: 74 MMHG | SYSTOLIC BLOOD PRESSURE: 142 MMHG | WEIGHT: 148.94 LBS | HEIGHT: 65 IN

## 2022-09-08 DIAGNOSIS — E78.5 HYPERLIPIDEMIA ASSOCIATED WITH TYPE 2 DIABETES MELLITUS: Chronic | ICD-10-CM

## 2022-09-08 DIAGNOSIS — C73 METASTASIS FROM THYROID CANCER: ICD-10-CM

## 2022-09-08 DIAGNOSIS — E11.00 TYPE 2 DIABETES MELLITUS WITH HYPEROSMOLARITY WITHOUT COMA, WITHOUT LONG-TERM CURRENT USE OF INSULIN: Chronic | ICD-10-CM

## 2022-09-08 DIAGNOSIS — N18.31 STAGE 3A CHRONIC KIDNEY DISEASE: Chronic | ICD-10-CM

## 2022-09-08 DIAGNOSIS — K14.6 BURNING MOUTH SYNDROME: ICD-10-CM

## 2022-09-08 DIAGNOSIS — E11.59 HYPERTENSION ASSOCIATED WITH DIABETES: Chronic | ICD-10-CM

## 2022-09-08 DIAGNOSIS — E11.69 HYPERLIPIDEMIA ASSOCIATED WITH TYPE 2 DIABETES MELLITUS: ICD-10-CM

## 2022-09-08 DIAGNOSIS — G25.79 SEROTONIN SYNDROME: Primary | ICD-10-CM

## 2022-09-08 DIAGNOSIS — I15.2 HYPERTENSION ASSOCIATED WITH DIABETES: Chronic | ICD-10-CM

## 2022-09-08 DIAGNOSIS — C79.9 METASTASIS FROM THYROID CANCER: ICD-10-CM

## 2022-09-08 DIAGNOSIS — C73 THYROID CANCER: Chronic | ICD-10-CM

## 2022-09-08 DIAGNOSIS — E11.69 HYPERLIPIDEMIA ASSOCIATED WITH TYPE 2 DIABETES MELLITUS: Chronic | ICD-10-CM

## 2022-09-08 DIAGNOSIS — E78.5 HYPERLIPIDEMIA ASSOCIATED WITH TYPE 2 DIABETES MELLITUS: ICD-10-CM

## 2022-09-08 DIAGNOSIS — E83.51 HYPOCALCEMIA: ICD-10-CM

## 2022-09-08 PROCEDURE — 3078F DIAST BP <80 MM HG: CPT | Mod: CPTII,S$GLB,, | Performed by: FAMILY MEDICINE

## 2022-09-08 PROCEDURE — 1125F PR PAIN SEVERITY QUANTIFIED, PAIN PRESENT: ICD-10-PCS | Mod: CPTII,S$GLB,, | Performed by: FAMILY MEDICINE

## 2022-09-08 PROCEDURE — 3288F FALL RISK ASSESSMENT DOCD: CPT | Mod: CPTII,S$GLB,, | Performed by: FAMILY MEDICINE

## 2022-09-08 PROCEDURE — 99999 PR PBB SHADOW E&M-EST. PATIENT-LVL IV: ICD-10-PCS | Mod: PBBFAC,,, | Performed by: FAMILY MEDICINE

## 2022-09-08 PROCEDURE — 99214 PR OFFICE/OUTPT VISIT, EST, LEVL IV, 30-39 MIN: ICD-10-PCS | Mod: S$GLB,,, | Performed by: FAMILY MEDICINE

## 2022-09-08 PROCEDURE — 1101F PR PT FALLS ASSESS DOC 0-1 FALLS W/OUT INJ PAST YR: ICD-10-PCS | Mod: CPTII,S$GLB,, | Performed by: FAMILY MEDICINE

## 2022-09-08 PROCEDURE — 1159F MED LIST DOCD IN RCRD: CPT | Mod: CPTII,S$GLB,, | Performed by: FAMILY MEDICINE

## 2022-09-08 PROCEDURE — 99999 PR PBB SHADOW E&M-EST. PATIENT-LVL IV: CPT | Mod: PBBFAC,,, | Performed by: FAMILY MEDICINE

## 2022-09-08 PROCEDURE — 1101F PT FALLS ASSESS-DOCD LE1/YR: CPT | Mod: CPTII,S$GLB,, | Performed by: FAMILY MEDICINE

## 2022-09-08 PROCEDURE — 3077F PR MOST RECENT SYSTOLIC BLOOD PRESSURE >= 140 MM HG: ICD-10-PCS | Mod: CPTII,S$GLB,, | Performed by: FAMILY MEDICINE

## 2022-09-08 PROCEDURE — 99214 OFFICE O/P EST MOD 30 MIN: CPT | Mod: S$GLB,,, | Performed by: FAMILY MEDICINE

## 2022-09-08 PROCEDURE — 1160F RVW MEDS BY RX/DR IN RCRD: CPT | Mod: CPTII,S$GLB,, | Performed by: FAMILY MEDICINE

## 2022-09-08 PROCEDURE — 3288F PR FALLS RISK ASSESSMENT DOCUMENTED: ICD-10-PCS | Mod: CPTII,S$GLB,, | Performed by: FAMILY MEDICINE

## 2022-09-08 PROCEDURE — 99499 UNLISTED E&M SERVICE: CPT | Mod: S$GLB,,, | Performed by: FAMILY MEDICINE

## 2022-09-08 PROCEDURE — 1125F AMNT PAIN NOTED PAIN PRSNT: CPT | Mod: CPTII,S$GLB,, | Performed by: FAMILY MEDICINE

## 2022-09-08 PROCEDURE — 3078F PR MOST RECENT DIASTOLIC BLOOD PRESSURE < 80 MM HG: ICD-10-PCS | Mod: CPTII,S$GLB,, | Performed by: FAMILY MEDICINE

## 2022-09-08 PROCEDURE — 1160F PR REVIEW ALL MEDS BY PRESCRIBER/CLIN PHARMACIST DOCUMENTED: ICD-10-PCS | Mod: CPTII,S$GLB,, | Performed by: FAMILY MEDICINE

## 2022-09-08 PROCEDURE — 99499 RISK ADDL DX/OHS AUDIT: ICD-10-PCS | Mod: S$GLB,,, | Performed by: FAMILY MEDICINE

## 2022-09-08 PROCEDURE — 1159F PR MEDICATION LIST DOCUMENTED IN MEDICAL RECORD: ICD-10-PCS | Mod: CPTII,S$GLB,, | Performed by: FAMILY MEDICINE

## 2022-09-08 PROCEDURE — 3077F SYST BP >= 140 MM HG: CPT | Mod: CPTII,S$GLB,, | Performed by: FAMILY MEDICINE

## 2022-09-08 RX ORDER — ROSUVASTATIN CALCIUM 20 MG/1
10 TABLET, COATED ORAL EVERY OTHER DAY
Qty: 90 TABLET | Refills: 0
Start: 2022-09-08 | End: 2023-01-04

## 2022-09-08 NOTE — PROGRESS NOTES
Subjective:       Patient ID: Ercia Masterson is a 76 y.o. female.    Chief Complaint: Diabetes (Pt states that she is here for her fu ), Breast Problem (Pt states that she has a spot on her left breast that she would like for you to take a look at), Leg Pain (Pt  states that she would like to discuss with you regarding the pain in her legs and cholesterol medication.), and Thrush (Pt states that she thinks she still has thrush on her tongue, and she states that she has a dental appointment on tomorrow for a cleaning and unsure if she should go or not. )    76-year-old female coming in to follow-up from her August 18 2022 visit.  She was having frequent hospital admissions for what appeared to be panic disorder often awakening her in the middle of the night with tachycardia, shortness of breath, nausea, headaches, tremors and muscle spasms as well as dizziness.  Most of the time her symptoms had resolved when she arrived at the emergency room or soon after.  She had extensive workups with no diagnosis revealed.  She had been evaluated by Psychiatry, Cardiology, and had a pending neurology appointment with Dr. Lovett.  She had a known history of generalized anxiety disorder and depression, peripheral arterial disease, type 2 diabetes with nephropathy and without insulin use, hypertension, hyperlipidemia on Crestor and stage 3 chronic kidney disease.  She had had a thyroidectomy for thyroid cancer with incomplete removal requiring a 2nd operation and is followed by Dr. Eubanks.  Her most recent admission was August 6 through August 9 with a presentation at [facility] with tingling in the hands and feet and finding of hypoglycemia.  She was started on calcium supplementation taking two Tums 3 times a day.  She also has been diagnosed with reflux and thrush when she presented with symptoms of burning in the mouth.  She is still taking nystatin and continues to complain of a burning sensation.    At the time of the previous  visit August 18 she was currently complaining of tachycardia, dizziness, tremors, jerking and shaking with muscle spasms in the hands and feet.  Most of the time she was awakened from a sound sleep.  She was taking Remeron, Cymbalta, Sinequan, Prevacid and Pepcid.  Her nighttime medications taken at bedtime included doxepin, Remeron, and Cymbalta as well as her Crestor and two Tums.  She repeatedly was awaken between three and 4:00 p.m. by her symptoms.  The pattern strongly suggested a potential connection with her nocturnal medications.  Note is made that many of the medications were added in response to her symptoms and were not being used when they 1st began.  At the August 18th visit she was taken off of Crestor for complaints of leg pain and spasm, doxepin, Remeron, Cymbalta, Prevacid, and Pepcid.  She recently resumed taking the doxepin which she is using as a sleep aid and has had no problems since resuming it.  Note also is made that she has not had an emergency room visit since the medications were discontinued although she continues to have muscle spasms in the left shoulder which closely approximates the area involved with her neck dissection for her thyroid cancer.  She recently had Botox injection in this area by Dr. Alvarez but has not yet received any relief from the discomfort in that area.  Recent TSH levels were below level of detection and her free T4 was above normal.  At that time her Synthroid was reduced from 100 mcg daily to 88 mcg daily.  She is schedule for a follow-up including the thyroglobulin on September 30th.    She reports that the discomfort in her legs resolved when she discontinued the Crestor.  Other than the shoulder pain most of her aches and pains and panic symptoms have resolved.    Past Medical History:  3/26/2012: Adenomatous polyp of colon  No date: Allergy  No date: Anxiety  No date: Cancer  No date: Cataract  No date: Colon polyp  04/03/2012: Degenerative arthritis of  knee  No date: Diverticulosis  No date: Encounter for blood transfusion  No date: GERD (gastroesophageal reflux disease)  2021: History of thyroid cancer  No date: Hyperlipidemia  No date: Hypertension  5/20/2013: Lateral meniscal tear  03/26/2012: Multinodular goiter  01/18/2010: Myopathy, unspecified  No date: Tuberculosis    Past Surgical History:  2015: BREAST BIOPSY; Left      Comment:  benign  No date: CHOLECYSTECTOMY  12/2/15: COLONOSCOPY      Comment:  Dr. Britton, multiple polyps, recheck five years-three                years if more than 2 polyps are adenomatous  12/2/2015: COLONOSCOPY; N/A  3/20/2019: COLONOSCOPY; N/A      Comment:  Procedure: COLONOSCOPY;  Surgeon: Jose Britton MD;                Location: [facility];  Service: Endoscopy;  Laterality:                N/A;  5/20/2020: COLONOSCOPY; N/A      Comment:  Dr. Britton; internal hemorrhoids; diverticulosis;                polyps removed; repeat in 3 years  8/26/2020: CYSTOSCOPY; N/A      Comment:  Procedure: CYSTOSCOPY;  Surgeon: Charlotte Walters Jr., MD;               Location: [facility];  Service: Urology;  Laterality: N/A;  9/13/2021: DISSECTION OF NECK; Left      Comment:  Procedure: DISSECTION, NECK;  Surgeon: Ryan Torres MD;  [address];  Service: ENT;                 Laterality: Left;  8/22/2022: ESOPHAGEAL MANOMETRY WITH MEASUREMENT OF IMPEDANCE; N/A      Comment:  Procedure: MANOMETRY, ESOPHAGUS, WITH IMPEDANCE                MEASUREMENT;  Surgeon: Pantera Mcguire MD;  Location: [facility] (4TH FLR);  Service: Endoscopy;  Laterality: N/A;                 8/4 fully vaccinated; instructions mailed-st  5/20/2020: ESOPHAGOGASTRODUODENOSCOPY; N/A      Comment:  Dr. Britton; small hiatal hernia; gastritis; 8 gastric                polyps removed  4/1/2022: ESOPHAGOGASTRODUODENOSCOPY; N/A      Comment:  Procedure: EGD (ESOPHAGOGASTRODUODENOSCOPY);  Surgeon:                Jose Britton MD;   Location: [facility] ENDO;  Service:                Endoscopy;  Laterality: N/A;  No date: FOOT SURGERY  No date: HYSTERECTOMY      Comment:  TVH/BSO > 20 years  6/4/2020: INTRALUMINAL GASTROINTESTINAL TRACT IMAGING VIA CAPSULE; N/A      Comment:  Procedure: IMAGING PROCEDURE, GI TRACT, INTRALUMINAL,                VIA CAPSULE;  Surgeon: Jose Britton MD;  Location:                [facility] ENDO;  Service: Endoscopy;  Laterality: N/A;  06/06/2013: KNEE SURGERY      Comment:  right knee tear Dr Iverson   9/17/2020: LAPAROSCOPIC CHOLECYSTECTOMY; N/A      Comment:  Procedure: CHOLECYSTECTOMY, LAPAROSCOPIC;  Surgeon:                Forrest Veronica MD;  Location: [facility] OR;  Service:                General;  Laterality: N/A;  1966: LUNG LOBECTOMY      Comment:  right middle lobectomy, due to Tb  No date: OOPHORECTOMY  No date: SHOULDER SURGERY      Comment:  francis   5/19/2021: THYROIDECTOMY; Bilateral      Comment:  Procedure: THYROIDECTOMY;  Surgeon: Jamia Amezquita MD;  [facility];  Service: General;                 Laterality: Bilateral;  9/13/2021: THYROIDECTOMY; Bilateral      Comment:  Procedure: THYROIDECTOMY WITH INTRA-OP PTH;  Surgeon:                Ryan Torres MD;  Location: [facility] OR 2ND FLR;  Service:               ENT;  Laterality: Bilateral;  NIM tube  Intraop PTH    Current Outpatient Medications on File Prior to Visit:  amLODIPine (NORVASC) 2.5 MG tablet, Take 1 tablet by mouth once daily, Disp: 90 tablet, Rfl: 0  artificial tears (ISOPTO TEARS) 0.5 % ophthalmic solution, 1 drop as needed., Disp: , Rfl:   blood sugar diagnostic (BLOOD GLUCOSE TEST) Strp, True Metrix glucose strips check once daily, Disp: 100 each, Rfl: 3  blood-glucose meter kit, True Metrix glucometer check glucose once daily, Disp: 1 each, Rfl: 0  calcium carbonate (TUMS) 200 mg calcium (500 mg) chewable tablet, Chew and swallow 2 tablets (1,000 mg total) by mouth 3 (three) times daily. for 14 days, Disp:  100 tablet, Rfl: 0  conjugated estrogens (PREMARIN) vaginal cream, Place 0.5 g vaginally once daily AND 0.5 g twice a week., Disp: 30 g, Rfl: 7  doxepin (SINEQUAN) 25 MG capsule, Take 25 mg by mouth every evening., Disp: , Rfl:   ergocalciferol (ERGOCALCIFEROL) 50,000 unit Cap, Take 1 capsule (50,000 Units total) by mouth every 30 days., Disp: 3 capsule, Rfl: 3  levothyroxine (SYNTHROID) 88 MCG tablet, Take 1 tablet (88 mcg total) by mouth before breakfast., Disp: 90 tablet, Rfl: 3  losartan (COZAAR) 100 MG tablet, Take 1 tablet by mouth once daily, Disp: 90 tablet, Rfl: 1  coQ10, ubiquinol, 100 mg Cap, Take 100 mg by mouth once daily., Disp: , Rfl:   gabapentin (NEURONTIN) 300 MG capsule, Take 300 mg by mouth 3 (three) times daily., Disp: , Rfl:   nystatin (MYCOSTATIN) 100,000 unit/mL suspension, Take 4 mLs by mouth 4 (four) times daily., Disp: , Rfl:     No current facility-administered medications on file prior to visit.        Review of Systems   HENT:  Positive for mouth sores (Burning sensation but no lesions).    Musculoskeletal:  Positive for myalgias (Left upper trapezius area adjacent to the neck) and neck pain.     Objective:      Physical Exam  Vitals and nursing note reviewed.   Constitutional:       General: She is not in acute distress.     Appearance: Normal appearance. She is normal weight. She is not ill-appearing, toxic-appearing or diaphoretic.      Comments: Mildly elevated systolic blood pressure   Normal weight with a BMI of 24.8 she is down 0.4 lb from her August 18, 2022 visit   HENT:      Mouth/Throat:      Mouth: Mucous membranes are moist. No injury, lacerations, oral lesions or angioedema.      Palate: Mass present. No lesions.     Neurological:      Mental Status: She is alert.   Psychiatric:         Attention and Perception: Attention normal.         Mood and Affect: Mood and affect normal. Mood is not anxious, depressed or elated. Affect is not labile, blunt, flat, angry, tearful or  inappropriate.         Speech: Speech normal.         Behavior: Behavior normal. Behavior is cooperative.         Thought Content: Thought content normal.         Cognition and Memory: Cognition normal.       Assessment:       1. Serotonin syndrome    2. Burning mouth syndrome    3. Thyroid cancer    4. Metastasis from thyroid cancer    5. Type 2 diabetes mellitus with hyperosmolarity without coma, without long-term current use of insulin    6. Hypocalcemia    7. Stage 3a chronic kidney disease    8. Hypertension associated with diabetes    9. Hyperlipidemia associated with type 2 diabetes mellitus, baseline           Plan:       1. Serotonin syndrome  At risk, improved.  She seems to be having no problems with the low-dose doxepin but will try not to resume the other potential contributors    2. Burning mouth syndrome  Does not appear to be any need to remain on nystatin.  Previously given gabapentin which may be an effective treatment by Dr. Robles    3. Thyroid cancer  Followed by Dr. Eubanks    4. Metastasis from thyroid cancer  See above    5. Type 2 diabetes mellitus with hyperosmolarity without coma, without long-term current use of insulin  Lab Results   Component Value Date    HGBA1C 6.0 (H) 08/08/2022     Well controlled    6. Hypocalcemia  Lab Results   Component Value Date    CALCIUM 8.8 08/30/2022    PHOS 4.6 (H) 08/09/2022     Currently in low end of normal range.  Encourage patient to continue taking calcium supplementation    7. Stage 3a chronic kidney disease  BMP  Lab Results   Component Value Date     08/30/2022    K 3.9 08/30/2022     08/30/2022    CO2 28 08/30/2022    BUN 11 08/30/2022    CREATININE 0.9 08/30/2022    CALCIUM 8.8 08/30/2022    ANIONGAP 8 08/30/2022    ESTGFRAFRICA 56 (A) 07/19/2022    EGFRNONAA 48 (A) 07/19/2022     Stable    8. Hypertension associated with diabetes  Mildly elevated systolic blood pressure, will hold at this level for now    9. Hyperlipidemia  associated with type 2 diabetes mellitus, baseline   Lab Results   Component Value Date    CHOL 154 08/08/2022    CHOL 177 04/06/2022    CHOL 163 10/30/2020     Lab Results   Component Value Date    HDL 59 08/08/2022    HDL 70 04/06/2022    HDL 63 10/30/2020     Lab Results   Component Value Date    LDLCALC 86.4 08/08/2022    LDLCALC 97.4 04/06/2022    LDLCALC 91.6 10/30/2020     Lab Results   Component Value Date    TRIG 43 08/08/2022    TRIG 48 04/06/2022    TRIG 42 10/30/2020     Lab Results   Component Value Date    CHOLHDL 38.3 08/08/2022    CHOLHDL 39.5 04/06/2022    CHOLHDL 38.7 10/30/2020     Will resume Crestor taking every other day and see if she can tolerate it without significant myalgia

## 2022-09-13 ENCOUNTER — HOSPITAL ENCOUNTER (OUTPATIENT)
Dept: RADIOLOGY | Facility: CLINIC | Age: 77
Discharge: HOME | End: 2022-09-13
Attending: FAMILY MEDICINE
Payer: MEDICARE

## 2022-09-13 ENCOUNTER — TELEPHONE (OUTPATIENT)
Dept: PHYSICAL MEDICINE AND REHAB | Facility: CLINIC | Age: 77
End: 2022-09-13
Payer: MEDICARE

## 2022-09-13 ENCOUNTER — TELEPHONE (OUTPATIENT)
Dept: OPHTHALMOLOGY | Facility: CLINIC | Age: 77
End: 2022-09-13
Payer: MEDICARE

## 2022-09-13 DIAGNOSIS — R92.8 ABNORMALITY OF LEFT BREAST ON SCREENING MAMMOGRAM: Primary | ICD-10-CM

## 2022-09-13 DIAGNOSIS — Z12.31 BREAST CANCER SCREENING BY MAMMOGRAM: ICD-10-CM

## 2022-09-13 PROCEDURE — 77063 BREAST TOMOSYNTHESIS BI: CPT | Mod: 26,,, | Performed by: RADIOLOGY

## 2022-09-13 PROCEDURE — 77063 MAMMO DIGITAL SCREENING BILAT WITH TOMO: ICD-10-PCS | Mod: 26,,, | Performed by: RADIOLOGY

## 2022-09-13 PROCEDURE — 77063 BREAST TOMOSYNTHESIS BI: CPT | Mod: TC,PO

## 2022-09-13 PROCEDURE — 77067 MAMMO DIGITAL SCREENING BILAT WITH TOMO: ICD-10-PCS | Mod: 26,,, | Performed by: RADIOLOGY

## 2022-09-13 PROCEDURE — 77067 SCR MAMMO BI INCL CAD: CPT | Mod: 26,,, | Performed by: RADIOLOGY

## 2022-09-13 NOTE — TELEPHONE ENCOUNTER
Returned call to patient with c/o tightness to shoulder and mucus r/s f/u from 10/3 to 9/14/2022 at 900. Verbalizes understanding.

## 2022-09-13 NOTE — TELEPHONE ENCOUNTER
Spoke to patient and recommended using artificial tears for dryness 4 times a day. Recommend using artificial tear ointment/gel at night before bed time to help with dryness over night.       -TD    ----- Message from Batsheva Verma sent at 9/13/2022  8:09 AM CDT -----  Who Called: Patient    What is the reqeust in detail: Requesting call back to reschedule her Oct appointment to something this week. Patient is also experiencing mucus in both eyes when waking up for the past 4 days now. Please advise.     Can the clinic reply by [facility]? No    Best Call Back Number: [phone] if no answer [phone]    Additional Information:

## 2022-09-13 NOTE — TELEPHONE ENCOUNTER
----- Message from Fabio May sent at 9/13/2022  7:17 AM CDT -----  Contact: pt at [phone]  Type:  Same Day Appointment Request    Caller is requesting a same day appointment.  Caller declined first available appointment listed below.      Name of Caller:  pt  When is the first available appointment?  10/21/22  Symptoms:  left side of shoulder and neck pain and congested  Best Call Back Number:  [phone]  Additional Information:  pt is calling the office to schedule an appt for today due to her left side of shoulder and neck pain and congested and the date of 10/21/22 comes up she would like to be worked in to be seen today. Please call back and advise.

## 2022-09-14 ENCOUNTER — TELEPHONE (OUTPATIENT)
Dept: FAMILY MEDICINE | Facility: CLINIC | Age: 77
End: 2022-09-14
Payer: MEDICARE

## 2022-09-14 ENCOUNTER — TELEPHONE (OUTPATIENT)
Dept: OTOLARYNGOLOGY | Facility: CLINIC | Age: 77
End: 2022-09-14
Payer: MEDICARE

## 2022-09-14 ENCOUNTER — OFFICE VISIT (OUTPATIENT)
Dept: PHYSICAL MEDICINE AND REHAB | Facility: CLINIC | Age: 77
End: 2022-09-14
Payer: MEDICARE

## 2022-09-14 VITALS
WEIGHT: 148.81 LBS | BODY MASS INDEX: 24.79 KG/M2 | HEIGHT: 65 IN | SYSTOLIC BLOOD PRESSURE: 127 MMHG | HEART RATE: 71 BPM | DIASTOLIC BLOOD PRESSURE: 63 MMHG

## 2022-09-14 DIAGNOSIS — G24.3 CERVICAL DYSTONIA: Primary | ICD-10-CM

## 2022-09-14 PROCEDURE — 3288F PR FALLS RISK ASSESSMENT DOCUMENTED: ICD-10-PCS | Mod: CPTII,S$GLB,, | Performed by: PHYSICAL MEDICINE & REHABILITATION

## 2022-09-14 PROCEDURE — 1159F MED LIST DOCD IN RCRD: CPT | Mod: CPTII,S$GLB,, | Performed by: PHYSICAL MEDICINE & REHABILITATION

## 2022-09-14 PROCEDURE — 1125F AMNT PAIN NOTED PAIN PRSNT: CPT | Mod: CPTII,S$GLB,, | Performed by: PHYSICAL MEDICINE & REHABILITATION

## 2022-09-14 PROCEDURE — 3074F PR MOST RECENT SYSTOLIC BLOOD PRESSURE < 130 MM HG: ICD-10-PCS | Mod: CPTII,S$GLB,, | Performed by: PHYSICAL MEDICINE & REHABILITATION

## 2022-09-14 PROCEDURE — 1101F PR PT FALLS ASSESS DOC 0-1 FALLS W/OUT INJ PAST YR: ICD-10-PCS | Mod: CPTII,S$GLB,, | Performed by: PHYSICAL MEDICINE & REHABILITATION

## 2022-09-14 PROCEDURE — 3074F SYST BP LT 130 MM HG: CPT | Mod: CPTII,S$GLB,, | Performed by: PHYSICAL MEDICINE & REHABILITATION

## 2022-09-14 PROCEDURE — 1101F PT FALLS ASSESS-DOCD LE1/YR: CPT | Mod: CPTII,S$GLB,, | Performed by: PHYSICAL MEDICINE & REHABILITATION

## 2022-09-14 PROCEDURE — 3078F DIAST BP <80 MM HG: CPT | Mod: CPTII,S$GLB,, | Performed by: PHYSICAL MEDICINE & REHABILITATION

## 2022-09-14 PROCEDURE — 1125F PR PAIN SEVERITY QUANTIFIED, PAIN PRESENT: ICD-10-PCS | Mod: CPTII,S$GLB,, | Performed by: PHYSICAL MEDICINE & REHABILITATION

## 2022-09-14 PROCEDURE — 1159F PR MEDICATION LIST DOCUMENTED IN MEDICAL RECORD: ICD-10-PCS | Mod: CPTII,S$GLB,, | Performed by: PHYSICAL MEDICINE & REHABILITATION

## 2022-09-14 PROCEDURE — 3078F PR MOST RECENT DIASTOLIC BLOOD PRESSURE < 80 MM HG: ICD-10-PCS | Mod: CPTII,S$GLB,, | Performed by: PHYSICAL MEDICINE & REHABILITATION

## 2022-09-14 PROCEDURE — 99213 OFFICE O/P EST LOW 20 MIN: CPT | Mod: S$GLB,,, | Performed by: PHYSICAL MEDICINE & REHABILITATION

## 2022-09-14 PROCEDURE — 99213 PR OFFICE/OUTPT VISIT, EST, LEVL III, 20-29 MIN: ICD-10-PCS | Mod: S$GLB,,, | Performed by: PHYSICAL MEDICINE & REHABILITATION

## 2022-09-14 PROCEDURE — 99999 PR PBB SHADOW E&M-EST. PATIENT-LVL IV: ICD-10-PCS | Mod: PBBFAC,,, | Performed by: PHYSICAL MEDICINE & REHABILITATION

## 2022-09-14 PROCEDURE — 3288F FALL RISK ASSESSMENT DOCD: CPT | Mod: CPTII,S$GLB,, | Performed by: PHYSICAL MEDICINE & REHABILITATION

## 2022-09-14 PROCEDURE — 99999 PR PBB SHADOW E&M-EST. PATIENT-LVL IV: CPT | Mod: PBBFAC,,, | Performed by: PHYSICAL MEDICINE & REHABILITATION

## 2022-09-14 NOTE — TELEPHONE ENCOUNTER
----- Message from Narayan Myers MD sent at 9/13/2022  9:36 PM CDT -----  She has two abnormalities in the left breast that need diagnostic mammogram and ultrasound.  Orders are in.      Patient notified by e-mail

## 2022-09-14 NOTE — TELEPHONE ENCOUNTER
Call placed to patient. Call answered by patient's  who indicated patient was not available at time of call. Patient's  stated he would notify patient to return call to office.

## 2022-09-14 NOTE — PROGRESS NOTES
HPI:  Patient is a 76 y.o. year old female w. Cervical dystonia. She is s/p botox 200units w.mild improvement of muscular tension.  Initially, she felt that her post nasal drip may have been related to the injection. She denies any new weakness or neuro deficits      Past Medical History:   Diagnosis Date    Adenomatous polyp of colon 3/26/2012    Allergy     Anxiety     Cancer     Cataract     Colon polyp     Degenerative arthritis of knee 04/03/2012    Diverticulosis     Encounter for blood transfusion     GERD (gastroesophageal reflux disease)     History of thyroid cancer 2021    Hyperlipidemia     Hypertension     Lateral meniscal tear 5/20/2013    Multinodular goiter 03/26/2012    Myopathy, unspecified 01/18/2010    Tuberculosis      Past Surgical History:   Procedure Laterality Date    BREAST BIOPSY Left 2015    benign    CHOLECYSTECTOMY      COLONOSCOPY  12/2/15    Dr. Britton, multiple polyps, recheck five years-three years if more than 2 polyps are adenomatous    COLONOSCOPY N/A 12/2/2015    COLONOSCOPY N/A 3/20/2019    Procedure: COLONOSCOPY;  Surgeon: Jose Britton MD;  Location: [facility];  Service: Endoscopy;  Laterality: N/A;    COLONOSCOPY N/A 5/20/2020    Dr. Britton; internal hemorrhoids; diverticulosis; polyps removed; repeat in 3 years    CYSTOSCOPY N/A 8/26/2020    Procedure: CYSTOSCOPY;  Surgeon: Charlotte Walters Jr., MD;  Location: [facility];  Service: Urology;  Laterality: N/A;    DISSECTION OF NECK Left 9/13/2021    Procedure: DISSECTION, NECK;  Surgeon: Ryan Torres MD;  Location: [facility] 2ND FLR;  Service: ENT;  Laterality: Left;    ESOPHAGEAL MANOMETRY WITH MEASUREMENT OF IMPEDANCE N/A 8/22/2022    Procedure: MANOMETRY, ESOPHAGUS, WITH IMPEDANCE MEASUREMENT;  Surgeon: Pantera Mcguire MD;  Location: [facility] (4TH FLR);  Service: Endoscopy;  Laterality: N/A;  8/4 fully vaccinated; instructions mailed-st    ESOPHAGOGASTRODUODENOSCOPY N/A 5/20/2020    Dr. Britton; small hiatal hernia;  gastritis; 8 gastric polyps removed    ESOPHAGOGASTRODUODENOSCOPY N/A 4/1/2022    Procedure: EGD (ESOPHAGOGASTRODUODENOSCOPY);  Surgeon: Jose Brittno MD;  Location: [facility];  Service: Endoscopy;  Laterality: N/A;    FOOT SURGERY      HYSTERECTOMY      TVH/BSO > 20 years    INTRALUMINAL GASTROINTESTINAL TRACT IMAGING VIA CAPSULE N/A 6/4/2020    Procedure: IMAGING PROCEDURE, GI TRACT, INTRALUMINAL, VIA CAPSULE;  Surgeon: Jose Britton MD;  Location: [facility] ENDO;  Service: Endoscopy;  Laterality: N/A;    KNEE SURGERY  06/06/2013    right knee tear Dr Iverson     LAPAROSCOPIC CHOLECYSTECTOMY N/A 9/17/2020    Procedure: CHOLECYSTECTOMY, LAPAROSCOPIC;  Surgeon: Forrest Veronica MD;  Location: [facility];  Service: General;  Laterality: N/A;    LUNG LOBECTOMY  1966    right middle lobectomy, due to Tb    OOPHORECTOMY      SHOULDER SURGERY      francis     THYROIDECTOMY Bilateral 5/19/2021    Procedure: THYROIDECTOMY;  Surgeon: Jamia Amezquita MD;  [facility];  Service: General;  Laterality: Bilateral;    THYROIDECTOMY Bilateral 9/13/2021    Procedure: THYROIDECTOMY WITH INTRA-OP PTH;  Surgeon: Ryan Torres MD;  Location: [facility] OR 2ND FLR;  Service: ENT;  Laterality: Bilateral;  NIM tube  Intraop PTH     Family History   Problem Relation Age of Onset    Colon cancer Other     Cancer Mother     Hypertension Mother     Hyperlipidemia Mother     Cancer Brother     Hypertension Father     Emphysema Father     Diabetes Son     Hypertension Son     Alcohol abuse Son     Diabetes Maternal Aunt     Alzheimer's disease Maternal Uncle     Cancer Maternal Grandmother     No Known Problems Daughter     Dementia Sister     Alzheimer's disease Sister     Breast cancer Sister 60    Obesity Paternal Uncle     Prostate cancer Other     Cancer Brother     Melanoma Neg Hx     Psoriasis Neg Hx     Lupus Neg Hx     Eczema Neg Hx     Amblyopia Neg Hx     Blindness Neg Hx     Cataracts Neg Hx     Glaucoma Neg Hx      Macular degeneration Neg Hx     Retinal detachment Neg Hx     Strabismus Neg Hx     Stroke Neg Hx     Thyroid cancer Neg Hx     Colon polyps Neg Hx     Ulcerative colitis Neg Hx     Stomach cancer Neg Hx     Rectal cancer Neg Hx      Social History     Socioeconomic History    Marital status:    Tobacco Use    Smoking status: Never    Smokeless tobacco: Never   Substance and Sexual Activity    Alcohol use: Not Currently     Comment: stopped 2019    Drug use: No    Sexual activity: Not Currently       Review of patient's allergies indicates:  No Known Allergies    Current Outpatient Medications:     amLODIPine (NORVASC) 2.5 MG tablet, Take 1 tablet by mouth once daily, Disp: 90 tablet, Rfl: 0    artificial tears (ISOPTO TEARS) 0.5 % ophthalmic solution, 1 drop as needed., Disp: , Rfl:     blood sugar diagnostic (BLOOD GLUCOSE TEST) Strp, True Metrix glucose strips check once daily, Disp: 100 each, Rfl: 3    conjugated estrogens (PREMARIN) vaginal cream, Place 0.5 g vaginally once daily AND 0.5 g twice a week., Disp: 30 g, Rfl: 7    coQ10, ubiquinol, 100 mg Cap, Take 100 mg by mouth once daily., Disp: , Rfl:     doxepin (SINEQUAN) 25 MG capsule, Take 25 mg by mouth every evening., Disp: , Rfl:     DULoxetine (CYMBALTA) 30 MG capsule, Take 1 capsule (30 mg total) by mouth once daily., Disp: 30 capsule, Rfl: 11    ergocalciferol (ERGOCALCIFEROL) 50,000 unit Cap, Take 1 capsule (50,000 Units total) by mouth every 30 days., Disp: 3 capsule, Rfl: 3    gabapentin (NEURONTIN) 300 MG capsule, Take 300 mg by mouth 3 (three) times daily., Disp: , Rfl:     levothyroxine (SYNTHROID) 88 MCG tablet, Take 1 tablet (88 mcg total) by mouth before breakfast., Disp: 90 tablet, Rfl: 3    losartan (COZAAR) 100 MG tablet, Take 1 tablet by mouth once daily, Disp: 90 tablet, Rfl: 1    mirtazapine (REMERON) 15 MG tablet, Take 1 tablet (15 mg total) by mouth every evening., Disp: 30 tablet, Rfl: 0    nystatin (MYCOSTATIN) 100,000  unit/mL suspension, Take 4 mLs by mouth 4 (four) times daily., Disp: , Rfl:     rosuvastatin (CRESTOR) 20 MG tablet, Take 0.5 tablets (10 mg total) by mouth every other day., Disp: 90 tablet, Rfl: 0    baclofen (LIORESAL) 10 MG tablet, Take 1 tablet (10 mg total) by mouth 3 (three) times daily. Start w. Half a tab first 7 days (Patient not taking: Reported on 9/8/2022), Disp: 90 tablet, Rfl: 6    blood-glucose meter kit, True Metrix glucometer check glucose once daily, Disp: 1 each, Rfl: 0    calcium carbonate (TUMS) 200 mg calcium (500 mg) chewable tablet, Chew and swallow 2 tablets (1,000 mg total) by mouth 3 (three) times daily. for 14 days, Disp: 100 tablet, Rfl: 0        Review of Systems  No nausea, vomiting, fevers, Chills , contipation, diarrhea or sweats       Physical Exam:      Vitals:    09/14/22 0902   BP: 127/63   Pulse: 71   alert and oriented ×4 follows commands answers all questions appropriately,affect wnl  Manual muscle test 5 out of 5 sensation to light touch grossly intact  + tenderness cervical paraspinals  Inc tone of trapezius, hypertrophy of trapezius b/l  No muscular atrophy  Dec cervical rom  No head bobbing  No tremors  Nl gait  No C/C/E        Assessment:  S/p thyroidectomy w. Radiation resulting in cervical dystonia     Plan:  Inc botox to 300 units, priot auth placed- to be injected in 6 wks as follows:    Trapezius right 100 u  Lev scap right 30  Trapezius left 100 u  Lev scap left 30  Splenius cap. Right 20u  Splenius cap left 20 u  Total: 300 units    I will also add physical therapy to inc rom and strengthen antagonist muculature

## 2022-09-14 NOTE — TELEPHONE ENCOUNTER
----- Message from Janny Hoff sent at 9/14/2022  3:00 PM CDT -----  Type:  Sooner Appointment Request    Caller is requesting a sooner appointment.  Caller declined first available appointment listed below.  Caller will not accept being placed on the waitlist and is requesting a message be sent to doctor.    Name of Caller:  pt  When is the first available appointment?  10/12  Symptoms:  fungus in her mouth  Best Call Back Number: [phone] (home)       Additional Information:  please call and advise--thank you

## 2022-09-14 NOTE — TELEPHONE ENCOUNTER
Spoke w/pt after receiving call back msg. W/pt, I was able to get her scheduled for 09/21 @ 1300. Pt confirmed appt date/time.

## 2022-09-15 ENCOUNTER — OFFICE VISIT (OUTPATIENT)
Dept: FAMILY MEDICINE | Facility: CLINIC | Age: 77
End: 2022-09-15
Payer: MEDICARE

## 2022-09-15 ENCOUNTER — TELEPHONE (OUTPATIENT)
Dept: FAMILY MEDICINE | Facility: CLINIC | Age: 77
End: 2022-09-15
Payer: MEDICARE

## 2022-09-15 ENCOUNTER — DOCUMENT SCAN (OUTPATIENT)
Dept: HOME HEALTH SERVICES | Facility: HOSPITAL | Age: 77
End: 2022-09-15
Payer: MEDICARE

## 2022-09-15 VITALS
RESPIRATION RATE: 15 BRPM | DIASTOLIC BLOOD PRESSURE: 58 MMHG | TEMPERATURE: 99 F | HEART RATE: 92 BPM | WEIGHT: 151.88 LBS | HEIGHT: 65 IN | OXYGEN SATURATION: 98 % | BODY MASS INDEX: 25.3 KG/M2 | SYSTOLIC BLOOD PRESSURE: 102 MMHG

## 2022-09-15 DIAGNOSIS — H10.013 ACUTE FOLLICULAR CONJUNCTIVITIS OF BOTH EYES: Primary | ICD-10-CM

## 2022-09-15 PROCEDURE — 3074F SYST BP LT 130 MM HG: CPT | Mod: CPTII,S$GLB,, | Performed by: FAMILY MEDICINE

## 2022-09-15 PROCEDURE — 3074F PR MOST RECENT SYSTOLIC BLOOD PRESSURE < 130 MM HG: ICD-10-PCS | Mod: CPTII,S$GLB,, | Performed by: FAMILY MEDICINE

## 2022-09-15 PROCEDURE — 99999 PR PBB SHADOW E&M-EST. PATIENT-LVL V: ICD-10-PCS | Mod: PBBFAC,,, | Performed by: FAMILY MEDICINE

## 2022-09-15 PROCEDURE — 1125F AMNT PAIN NOTED PAIN PRSNT: CPT | Mod: CPTII,S$GLB,, | Performed by: FAMILY MEDICINE

## 2022-09-15 PROCEDURE — 1125F PR PAIN SEVERITY QUANTIFIED, PAIN PRESENT: ICD-10-PCS | Mod: CPTII,S$GLB,, | Performed by: FAMILY MEDICINE

## 2022-09-15 PROCEDURE — 3288F PR FALLS RISK ASSESSMENT DOCUMENTED: ICD-10-PCS | Mod: CPTII,S$GLB,, | Performed by: FAMILY MEDICINE

## 2022-09-15 PROCEDURE — 1160F PR REVIEW ALL MEDS BY PRESCRIBER/CLIN PHARMACIST DOCUMENTED: ICD-10-PCS | Mod: CPTII,S$GLB,, | Performed by: FAMILY MEDICINE

## 2022-09-15 PROCEDURE — 1101F PT FALLS ASSESS-DOCD LE1/YR: CPT | Mod: CPTII,S$GLB,, | Performed by: FAMILY MEDICINE

## 2022-09-15 PROCEDURE — 1160F RVW MEDS BY RX/DR IN RCRD: CPT | Mod: CPTII,S$GLB,, | Performed by: FAMILY MEDICINE

## 2022-09-15 PROCEDURE — 3078F DIAST BP <80 MM HG: CPT | Mod: CPTII,S$GLB,, | Performed by: FAMILY MEDICINE

## 2022-09-15 PROCEDURE — 99213 PR OFFICE/OUTPT VISIT, EST, LEVL III, 20-29 MIN: ICD-10-PCS | Mod: S$GLB,,, | Performed by: FAMILY MEDICINE

## 2022-09-15 PROCEDURE — 1159F PR MEDICATION LIST DOCUMENTED IN MEDICAL RECORD: ICD-10-PCS | Mod: CPTII,S$GLB,, | Performed by: FAMILY MEDICINE

## 2022-09-15 PROCEDURE — 3078F PR MOST RECENT DIASTOLIC BLOOD PRESSURE < 80 MM HG: ICD-10-PCS | Mod: CPTII,S$GLB,, | Performed by: FAMILY MEDICINE

## 2022-09-15 PROCEDURE — 99213 OFFICE O/P EST LOW 20 MIN: CPT | Mod: S$GLB,,, | Performed by: FAMILY MEDICINE

## 2022-09-15 PROCEDURE — 3288F FALL RISK ASSESSMENT DOCD: CPT | Mod: CPTII,S$GLB,, | Performed by: FAMILY MEDICINE

## 2022-09-15 PROCEDURE — 1159F MED LIST DOCD IN RCRD: CPT | Mod: CPTII,S$GLB,, | Performed by: FAMILY MEDICINE

## 2022-09-15 PROCEDURE — 1101F PR PT FALLS ASSESS DOC 0-1 FALLS W/OUT INJ PAST YR: ICD-10-PCS | Mod: CPTII,S$GLB,, | Performed by: FAMILY MEDICINE

## 2022-09-15 PROCEDURE — 99999 PR PBB SHADOW E&M-EST. PATIENT-LVL V: CPT | Mod: PBBFAC,,, | Performed by: FAMILY MEDICINE

## 2022-09-15 RX ORDER — TOBRAMYCIN AND DEXAMETHASONE 3; 1 MG/ML; MG/ML
1 SUSPENSION/ DROPS OPHTHALMIC 4 TIMES DAILY
Qty: 5 ML | Refills: 0 | Status: SHIPPED | OUTPATIENT
Start: 2022-09-15 | End: 2022-11-01 | Stop reason: SDUPTHER

## 2022-09-15 NOTE — TELEPHONE ENCOUNTER
Appointment scheduled for today with Dr. Schwarz. Patient agreed to appointment date, time, location, and provider.

## 2022-09-15 NOTE — TELEPHONE ENCOUNTER
----- Message from Tim Mckeon sent at 9/15/2022  7:30 AM CDT -----  Type: Needs Medical Advice  Who Called:  Patient  Symptoms (please be specific):  Crust in eyes, eye redness, difficulty swallowing  How long has patient had these symptoms:  A few days    Pharmacy name and phone #:    [id number] - [place], [place] - [address]  [place] [address]  Phone: [phone] Fax: [phone]      Best Call Back Number: [phone]  Additional Information: Please call to advise

## 2022-09-15 NOTE — PROGRESS NOTES
"  Subjective:       Patient ID: Erica Masterson is a 76 y.o. female.    Chief Complaint: Headache, Dizziness, hard to swallow, and Eye Problem (Crusty with mucus - states it takes awhile to adjust first thing in the morning upon waking )    Patient here for UC visit.  1- onset this week of eyes with crusting discharge; feel "tight".    2-for a number of months she has tongue irritation and coating.  Has been Rx with Nystatin lozenges then suspension with no change.  No oral mucosa or lip lesions.  No skin rashes.  She has an appt to see ENT for the tongue issue next week.    Headache   Associated symptoms include dizziness. Pertinent negatives include no abdominal pain, fever or nausea.   Dizziness:    Associated symptoms: headaches.no fever, no nausea and no chest pain.  Eye Problem   Pertinent negatives include no fever or nausea.   Review of Systems   Constitutional:  Negative for fever.   Respiratory:  Negative for shortness of breath.    Cardiovascular:  Negative for chest pain.   Gastrointestinal:  Negative for abdominal pain and nausea.   Skin:  Negative for rash.   Neurological:  Positive for dizziness and headaches.   All other systems reviewed and are negative.    Objective:      Physical Exam  Constitutional:       Appearance: She is well-developed.   HENT:      Right Ear: Tympanic membrane normal.      Left Ear: Tympanic membrane normal.      Nose: Nose normal.      Mouth/Throat:      Mouth: Mucous membranes are moist.      Comments: Geographic tongue coating/pattern.  Tan coating.  Some mild pitting/fissures on tongue.  Oral mucosa does not appear slick.  Eyes:      General: Lids are normal. No scleral icterus.        Right eye: No discharge.         Left eye: No discharge.      Conjunctiva/sclera:      Right eye: Right conjunctiva is injected.      Left eye: Left conjunctiva is injected.      Pupils: Pupils are equal, round, and reactive to light.      Comments: Lid everted - mild erythema and " cobblestoning   Cardiovascular:      Rate and Rhythm: Normal rate and regular rhythm.      Heart sounds: No murmur heard.  Pulmonary:      Effort: Pulmonary effort is normal.      Breath sounds: Normal breath sounds.   Musculoskeletal:         General: No tenderness.      Cervical back: Neck supple.   Lymphadenopathy:      Cervical: No cervical adenopathy.   Skin:     General: Skin is warm and dry.       Assessment:       1. Acute follicular conjunctivitis of both eyes        Plan:       Acute follicular conjunctivitis of both eyes  -     tobramycin-dexamethasone 0.3-0.1% (TOBRADEX) 0.3-0.1 % DrpS; Place 1 drop into both eyes 4 (four) times daily.  Dispense: 5 mL; Refill: 0    Patient Instructions   Take a multi-B vitamin and do a soft tooth brush tongue cleaning 2-3 times a day.

## 2022-09-16 ENCOUNTER — CLINICAL SUPPORT (OUTPATIENT)
Dept: REHABILITATION | Facility: HOSPITAL | Age: 77
End: 2022-09-16
Attending: PHYSICAL MEDICINE & REHABILITATION
Payer: MEDICARE

## 2022-09-16 DIAGNOSIS — R29.3 POOR POSTURE: ICD-10-CM

## 2022-09-16 DIAGNOSIS — G24.3 CERVICAL DYSTONIA: ICD-10-CM

## 2022-09-16 DIAGNOSIS — R29.898 DECREASED STRENGTH OF UPPER EXTREMITY: ICD-10-CM

## 2022-09-16 PROBLEM — M54.2 PAINFUL CERVICAL RANGE OF MOTION: Status: RESOLVED | Noted: 2021-09-28 | Resolved: 2022-09-16

## 2022-09-16 PROBLEM — M25.612 DECREASED RANGE OF MOTION OF LEFT SHOULDER: Status: RESOLVED | Noted: 2021-09-28 | Resolved: 2022-09-16

## 2022-09-16 PROCEDURE — 97161 PT EVAL LOW COMPLEX 20 MIN: CPT | Mod: PN

## 2022-09-16 NOTE — PLAN OF CARE
[facility]   Physical Therapy Initial Evaluation     Date: 9/16/2022   Name: Erica Masterson  Clinic Number: [id number]    Therapy Diagnosis:   Encounter Diagnoses   Name Primary?    Cervical dystonia     Poor posture     Decreased strength of upper extremity      Physician: Eunice Alvarez,*    Physician Orders: PT Eval and Treat  Medical Diagnosis from Referral: G24.3 (ICD-10-CM) - Cervical dystonia  Evaluation Date: 9/16/2022  Authorization Period Expiration: 10/14/2022  Plan of Care Expiration: 12/9/2022  Progress Note Due: 10/16/2022  Visit # / Visits authorized: 1/ 1   FOTO: 1/3    Precautions: Standard, Diabetes, and cancer     Time In: 720 AM  Time Out: 805 AM  Total Appointment Time (timed & untimed codes): 45 minutes    SUBJECTIVE     Date of onset: September 13th 2021 she received a second thyroidectomy due to failure of first of getting entire tumor. Recently receive botox injections of:     Trapezius right 100 u  Lev scap right 30  Trapezius left 100 u  Lev scap left 30  Splenius cap. Right 20u  Splenius cap left 20 u  Total: 300 units    History of current condition - Erica reports: tightness in shoulders and neck region continues. Reports some improvement since botox injections. Received injections two weeks ago and follow up on the 14th. Reports Kim said it would take some time and she recommended Physical Therapy. Patient is known to Physical Therapist as she has received multiple rounds of Physical Therapy prior to today. Reports works out 3 days a week with [employer]. Reports improvement in throat tightness that she used to get in the middle of the night. She is on anxiety medication and believes this has helped with her wakings in the middle of the night.. Reports R hand numbness and tingling- reports unrelated to surgery. Neuropathy BLEs- history of diabetes. Denies night pain.     Falls: 0    Imaging, none    Prior Therapy: multiple rounds   Social  History: one story home lives with their spouse  Occupation: retired   Hobbies: enjoys the community workouts, plays card games and board games (2x/week)  Prior Level of Function: independent   Current Level of Function: typically works through the pain, difficulty with mopping the floor, doing things for long periods of times seems to aggravate it    Pain:  Current 7/10, worst 7/10, best 4/10   Location: bilateral neck  Description: tightness   Aggravating Factors: morning  Easing Factors:  is on medication to help her go to sleep, nothing for pain     Patients goals: to where I can get less pain and tightness in the neck region      Medical History:   Past Medical History:   Diagnosis Date    Adenomatous polyp of colon 3/26/2012    Allergy     Anxiety     Cancer     Cataract     Colon polyp     Degenerative arthritis of knee 04/03/2012    Diverticulosis     Encounter for blood transfusion     GERD (gastroesophageal reflux disease)     History of thyroid cancer 2021    Hyperlipidemia     Hypertension     Lateral meniscal tear 5/20/2013    Multinodular goiter 03/26/2012    Myopathy, unspecified 01/18/2010    Tuberculosis        Surgical History:   Erica Masterson  has a past surgical history that includes Foot surgery; Shoulder surgery; Lung lobectomy (1966); Knee surgery (06/06/2013); Oophorectomy; Esophagogastroduodenoscopy (N/A, 5/20/2020); Breast biopsy (Left, 2015); Colonoscopy (12/2/15); Colonoscopy (N/A, 12/2/2015); Colonoscopy (N/A, 3/20/2019); Colonoscopy (N/A, 5/20/2020); Intraluminal gastrointestinal tract imaging via capsule (N/A, 6/4/2020); Cystoscopy (N/A, 8/26/2020); Laparoscopic cholecystectomy (N/A, 9/17/2020); Thyroidectomy (Bilateral, 5/19/2021); Thyroidectomy (Bilateral, 9/13/2021); Dissection of neck (Left, 9/13/2021); Hysterectomy; Cholecystectomy; Esophagogastroduodenoscopy (N/A, 4/1/2022); and Esophageal manometry with measurement of impedance (N/A, 8/22/2022).    Medications:   Erica sahu a  current medication list which includes the following prescription(s): amlodipine, isopto tears, baclofen, blood glucose test, blood-glucose meter, calcium carbonate, conjugated estrogens, coq10 (ubiquinol), doxepin, duloxetine, ergocalciferol, gabapentin, levothyroxine, losartan, mirtazapine, nystatin, rosuvastatin, and tobramycin-dexamethasone 0.3-0.1%.    Allergies:   Review of patient's allergies indicates:  No Known Allergies       OBJECTIVE     Structural/Postural Inspection:     Seated: Neutral scapulas, neg for upper or downward rotation, elongated upper trap bilaterally much greater on the L side compared to R side, slight scapular winging bilaterally more on R compared to L side    Standing: with scapular elevation, scapular dyskinesia noted bilaterally, no pain in shoulders with shoulder elevation     Active Range of Motion (AROM)/Passive Range of Motion (PROM):     Cervical AROM (Degrees) PROM (Degrees) Comments   Flexion 60 WFL Pull down middle of back region    Extension 45 Minimal limitations  Pull in front of neck    Right Side Bend 30 Minimal limitations  Pull on L side    Left Side Bend 26 Minimal limitations  Pull on R and L neck pain    Right Rotation 60 Minimal limitations  Pull on L    Left Rotation 70 Minimal limitations  Pull on both side      Shoulder AROM: WFL bilaterally, no pain with active shoulder elevation     Joint Accessory:     Cervical Mob w/ Endfeel Comments   PA glide Normal mobility  Pain reported    Right Side-glide NT    Left Side-glide NT      Thoracic Mob w/ Endfeel Comments   Posterior/Anterior Hypomobility Pain at T5-6     Manual Muscle Testing:     Cervical Strength Grade Comments   Flexion 3+/5    Extension 4/5    Right Side Bend 4/5    Left Side Bend 4/5    Right Rotation 4/5    Left Rotation 4/5      RUE Strength Grade LUE Strength Grade   Shoulder Flexion 4/5 Shoulder Flexion 4-/5   Shoulder Extension 4-/5 Shoulder Extension 4-/5   Shoulder Abduction 4/5 Shoulder  Abduction 4/5   Shoulder Adduction NT Shoulder Adduction NT   Shoulder Internal Rotation 4/5 Shoulder Internal Rotation 4/5   Shoulder External Rotation 4-/5 Shoulder External Rotation 4-/5   Elbow Flexion 4+/5 Elbow Flexion 4+/5   Elbow Extension 4+/5 Elbow Extension 4+/5   Wrist Extension 4+/5 Wrist Extension 4+/5   Upper Trap NT Upper Trap NT   Middle Trap 4-/5 Middle Trap 4-/5   Lower Trap 3+/5 Lower Trap 3+/5                 Special Tests:    Cervical Results Comments   Distraction Negative.    Compression Negative.      Movement Analysis:  scapular dyskinesia noted with eccentric control from shoulder flexion     Palpation for Tenderness:  positive for tenderness to B suboccipital muscles, B upper traps and levator scap, scalenes, splenis capitis     Sensation: intact sensation to light touch of B UE dermatomes     ULTT Right  Left Comments   Median Negative. Positive. More tightness noted in LUE compared to RUE    Musculocutaneous, Median, and Axillary Negative. Positive. More tightness of LUE compared to RUE      Reflexes: unable to get response of biceps and brachioradialis     Limitation/Restriction for FOTO 9/16/2022 Survey    Therapist reviewed FOTO scores for Erica Masterson on 9/16/2022.   FOTO documents entered into [employer] - see Media section.    Limitation Score: 47% (53/100)       TREATMENT     Total Treatment time (time-based codes) separate from Evaluation: 5 minutes      Erica received the treatments listed below:      therapeutic exercises to develop strength, endurance, ROM, flexibility, posture, and core stabilization for 5 minutes including:    Educated patient on HEP exercises. Had pt return demonstration to assess understanding and proper form. Pt demonstrated good understanding based on returned demonstration and verbalizations.     PATIENT EDUCATION AND HOME EXERCISES     Education provided:   - HEP  - therapy goals and POC     Written Home Exercises Provided: yes. Exercises were reviewed and  Erica was able to demonstrate them prior to the end of the session.  Erica demonstrated good  understanding of the education provided. See EMR under Patient Instructions for exercises provided during therapy sessions.    ASSESSMENT     Erica is a 76 y.o. female referred to outpatient Physical Therapy with a medical diagnosis of G24.3 (ICD-10-CM) - Cervical dystonia. Patient presents with c/o chronic cervical tightness since thyroidectomy which was performed in Sept 2021. She has completed 2 rounds of therapy prior to today with some improvements but continuation of tightness. She denies improvement since botox injections at this point which was performed two weeks ago. She demonstrates decreased active and passive cervical ROM, decreased B UE and shelbie scapular strength, decreased cervical strength especially flexion, poor posture with elongated upper traps with tenderness to palpation of multiple cervical musculature. These deficits have lead to impaired ability to carry out household chores and functional mobility. She also demonstrates more neural restrictions on the L side compared to R side with ULTT. Reflexes were unable to be detected but other red flags do not exist at this time. She will benefit from skilled Physical Therapist services to address above deficits which will lead to improved activity tolerance and improved quality of life.     Patient prognosis is Fair.   Patient will benefit from skilled outpatient Physical Therapy to address the deficits stated above and in the chart below, provide patient /family education, and to maximize patientt's level of independence.     Plan of care discussed with patient: Yes  Patient's spiritual, cultural and educational needs considered and patient is agreeable to the plan of care and goals as stated below:     Anticipated Barriers for therapy: co morbidities, history of multiple rounds of therapy for same diagnosis     Medical Necessity is demonstrated by the  following  History  Co-morbidities and personal factors that may impact the plan of care Co-morbidities:   advanced age, anxiety, diabetes, difficulty sleeping, history of cancer, and HTN    Personal Factors:   age     high   Examination  Body Structures and Functions, activity limitations and participation restrictions that may impact the plan of care Body Regions:   head  neck  back    Body Systems:    gross symmetry  ROM  strength  gross coordinated movement    Participation Restrictions:   Tightness of the cervical neck region leading to decreased activity tolerance with household chores     Activity limitations:   Learning and applying knowledge  no deficits    General Tasks and Commands  no deficits    Communication  no deficits    Mobility  lifting and carrying objects    Self care  no deficits    Domestic Life  shopping  cooking  doing house work (cleaning house, washing dishes, laundry)    Interactions/Relationships  no deficits    Life Areas  no deficits    Community and Social Life  community life  recreation and leisure         high   Clinical Presentation stable and uncomplicated low   Decision Making/ Complexity Score: low       Goals:    STG  Weeks/Visits Date Established  Goal Status    1.Patient will increase cervical AROM by 10 deg in all directions to improve functional mobility  4 weeks/ 8 visits  9/16/2022      2. Patient will report </= 5/10 pain in the morning for at worst pain to improve activity tolerance  4 weeks/ 8 visits  9/16/2022      3.Patient will report >/= 35% improvement in tightness and function since evaluation to improve quality of life  4 weeks/ 8 visits  9/16/2022      4. Pt will demonstrate and verbalize compliance and independence with HEP to improve therapy outcomes  4 weeks/ 8 visits  9/16/2022      5.Patient will report </= 40% limitation on FOTO to improve activity participation  4 weeks/ 8 visits  9/16/2022        LTG Weeks/Visits Date Established  Goal Status     1.Patient will report </= 3/10 pain in the morning for at worst pain to improve activity tolerance  8 weeks/ 12 visits  9/16/2022      2. Patient will report >/= 50% improvement in tightness and function since evaluation to improve quality of life  8 weeks/ 12 visits  9/16/2022        3.Patient will report </= 32% limitation on FOTO to improve activity participation  8 weeks/ 12 visits  9/16/2022      4. Patient will increase BUE strength to >/= 4/5 to improve ability to lift objects overhead  8 weeks/ 12 visits  9/16/2022      5. Patient will report a decrease in tenderness to palpation of cervical musculature since evaluation to improve ability to carry out household chores  8 weeks/ 12 visits  9/16/2022            PLAN   Plan of care Certification: 9/16/2022 to 12/9/2022.    Outpatient Physical Therapy 2 times weekly for 4 weeks then 1x/wk for 4 weeks to include the following interventions: Manual Therapy, Moist Heat/ Ice, Neuromuscular Re-ed, Patient Education, Therapeutic Activities, and Therapeutic Exercise.     Diamond Hampton, PT      I CERTIFY THE NEED FOR THESE SERVICES FURNISHED UNDER THIS PLAN OF TREATMENT AND WHILE UNDER MY CARE   Physician's comments:     Physician's Signature: ___________________________________________________

## 2022-09-19 ENCOUNTER — CLINICAL SUPPORT (OUTPATIENT)
Dept: REHABILITATION | Facility: HOSPITAL | Age: 77
End: 2022-09-19
Attending: PHYSICAL MEDICINE & REHABILITATION
Payer: MEDICARE

## 2022-09-19 DIAGNOSIS — R29.3 POOR POSTURE: Primary | ICD-10-CM

## 2022-09-19 DIAGNOSIS — R29.898 DECREASED STRENGTH OF UPPER EXTREMITY: ICD-10-CM

## 2022-09-19 PROCEDURE — 97112 NEUROMUSCULAR REEDUCATION: CPT | Mod: KX,PN,CQ

## 2022-09-19 PROCEDURE — 97110 THERAPEUTIC EXERCISES: CPT | Mod: KX,PN,CQ

## 2022-09-19 PROCEDURE — 97140 MANUAL THERAPY 1/> REGIONS: CPT | Mod: KX,PN,CQ

## 2022-09-19 NOTE — PROGRESS NOTES
[facility]   Physical Therapy Treatment Note     Name: Erica Masterson  Clinic Number: [id number]    Therapy Diagnosis:   Encounter Diagnoses   Name Primary?    Poor posture Yes    Decreased strength of upper extremity      Physician: Eunice Alvarez,*    Visit Date: 9/19/2022    Physician Orders: PT Eval and Treat  Medical Diagnosis from Referral: G24.3 (ICD-10-CM) - Cervical dystonia  Evaluation Date: 9/16/2022  Authorization Period Expiration: 10/14/2022  Plan of Care Expiration: 12/9/2022  Progress Note Due: 10/16/2022  Visit # / Visits authorized: 1/ 11   FOTO: 1/3     Precautions: Standard, Diabetes, and cancer     PTA Visit #: 2/5     Time In: 0830  Time Out: 0930  Total Billable Time: 55 minutes    SUBJECTIVE     Pt reports: Soreness and stiff.   She was compliant with home exercise program.  Response to previous treatment: first visit post initial evaluation   Functional change: non-remarkable     Pain: 5/10  Location: left neck      OBJECTIVE     Objective Measures updated at progress report unless specified.     Treatment     Erica received the treatments listed below:      therapeutic exercises to develop strength, endurance, ROM, flexibility, and posture for 25 minutes including:    Seated thoracic extension x 20 repetitions   Shoulder shrugs 3 pounds 2 x 15 repetitions   Land mines x 20 repetitions with lower upper extremity   Touch downs x 20 repetitions with more focus on Left upper trap    KT applied to left UT 1 I strip for facilitation 30% tension     manual therapy techniques: Joint mobilizations and Soft tissue Mobilization were applied to the: cervical spine for 10 minutes, including:    Sub occipital release  Upper cervical stretching to improve flexion     neuromuscular re-education activities to improve: Coordination, Kinesthetic, Sense, Proprioception, and Posture for 20 minutes. The following activities were included:    Prone row 3 pounds 2 x 15 repetitions     Significant scapular dyskinesia noted  Sidelying with hand resting on dowel performing scapular retraction 5 seconds holds x 30 repetitions   Prone scapular setting 5 seconds holds x 30 repetitions   Prone lower trap isometrics 5 seconds holds x 20 repetitions   Chin tucks with towel under occiput 5 seconds holds x 20 repetitions   Chin tucks with towel under CT junction 5 seconds holds x 20 repetitions           Patient Education and Home Exercises     Home Exercises Provided and Patient Education Provided     Education provided:   - Home Exercise Program   - Kinesiotape     Written Home Exercises Provided: yes. Exercises were reviewed and Erica was able to demonstrate them prior to the end of the session.  Erica demonstrated good  understanding of the education provided. See EMR under Patient Instructions for exercises provided during therapy sessions    ASSESSMENT     Due to significant scapular dyskinesia treatment this date was focused on improving mid trap, rhomboid, lower trap, and upper trap activation to reduce tension to left UT and cervical spine. She noted with more difficulty sustaining scapular isometric against gravity but with cueing was able to perform properly. Patient will continue to benefit from skilled Physical Therapy to improve above deficits to allow her to return to prior level of function without pain or limitations.     Erica Is progressing well towards her goals.   Pt prognosis is Fair.     Pt will continue to benefit from skilled outpatient physical therapy to address the deficits listed in the problem list box on initial evaluation, provide pt/family education and to maximize pt's level of independence in the home and community environment.     Pt's spiritual, cultural and educational needs considered and pt agreeable to plan of care and goals.     Anticipated barriers to physical therapy:  co morbidities, history of multiple rounds of therapy for same diagnosis     Goals:     STG   Weeks/Visits Date Established  Goal Status    1.Patient will increase cervical AROM by 10 deg in all directions to improve functional mobility  4 weeks/ 8 visits  9/16/2022    In progress 9/19/2022    2. Patient will report </= 5/10 pain in the morning for at worst pain to improve activity tolerance  4 weeks/ 8 visits  9/16/2022     In progress 9/19/2022   3.Patient will report >/= 35% improvement in tightness and function since evaluation to improve quality of life  4 weeks/ 8 visits  9/16/2022     In progress 9/19/2022   4. Pt will demonstrate and verbalize compliance and independence with HEP to improve therapy outcomes  4 weeks/ 8 visits  9/16/2022     In progress 9/19/2022   5.Patient will report </= 40% limitation on FOTO to improve activity participation  4 weeks/ 8 visits  9/16/2022     In progress 9/19/2022      LTG Weeks/Visits Date Established  Goal Status    1.Patient will report </= 3/10 pain in the morning for at worst pain to improve activity tolerance  8 weeks/ 12 visits  9/16/2022        2. Patient will report >/= 50% improvement in tightness and function since evaluation to improve quality of life  8 weeks/ 12 visits  9/16/2022           3.Patient will report </= 32% limitation on FOTO to improve activity participation  8 weeks/ 12 visits  9/16/2022        4. Patient will increase BUE strength to >/= 4/5 to improve ability to lift objects overhead  8 weeks/ 12 visits  9/16/2022        5. Patient will report a decrease in tenderness to palpation of cervical musculature since evaluation to improve ability to carry out household chores  8 weeks/ 12 visits  9/16/2022            PLAN     Progress periscapular and cervical strengthening as able.     Ananya Tam, PTA

## 2022-09-21 ENCOUNTER — TELEPHONE (OUTPATIENT)
Dept: ENDOCRINOLOGY | Facility: CLINIC | Age: 77
End: 2022-09-21
Payer: MEDICARE

## 2022-09-21 ENCOUNTER — OFFICE VISIT (OUTPATIENT)
Dept: OTOLARYNGOLOGY | Facility: CLINIC | Age: 77
End: 2022-09-21
Payer: MEDICARE

## 2022-09-21 VITALS — WEIGHT: 155 LBS | TEMPERATURE: 98 F | BODY MASS INDEX: 24.91 KG/M2 | HEIGHT: 66 IN

## 2022-09-21 DIAGNOSIS — K14.6 BURNING MOUTH SYNDROME: Primary | ICD-10-CM

## 2022-09-21 PROCEDURE — 1160F PR REVIEW ALL MEDS BY PRESCRIBER/CLIN PHARMACIST DOCUMENTED: ICD-10-PCS | Mod: CPTII,S$GLB,, | Performed by: PHYSICIAN ASSISTANT

## 2022-09-21 PROCEDURE — 99999 PR PBB SHADOW E&M-EST. PATIENT-LVL III: CPT | Mod: PBBFAC,,, | Performed by: PHYSICIAN ASSISTANT

## 2022-09-21 PROCEDURE — 1159F PR MEDICATION LIST DOCUMENTED IN MEDICAL RECORD: ICD-10-PCS | Mod: CPTII,S$GLB,, | Performed by: PHYSICIAN ASSISTANT

## 2022-09-21 PROCEDURE — 1159F MED LIST DOCD IN RCRD: CPT | Mod: CPTII,S$GLB,, | Performed by: PHYSICIAN ASSISTANT

## 2022-09-21 PROCEDURE — 1160F RVW MEDS BY RX/DR IN RCRD: CPT | Mod: CPTII,S$GLB,, | Performed by: PHYSICIAN ASSISTANT

## 2022-09-21 PROCEDURE — 3288F FALL RISK ASSESSMENT DOCD: CPT | Mod: CPTII,S$GLB,, | Performed by: PHYSICIAN ASSISTANT

## 2022-09-21 PROCEDURE — 99999 PR PBB SHADOW E&M-EST. PATIENT-LVL III: ICD-10-PCS | Mod: PBBFAC,,, | Performed by: PHYSICIAN ASSISTANT

## 2022-09-21 PROCEDURE — 1101F PR PT FALLS ASSESS DOC 0-1 FALLS W/OUT INJ PAST YR: ICD-10-PCS | Mod: CPTII,S$GLB,, | Performed by: PHYSICIAN ASSISTANT

## 2022-09-21 PROCEDURE — 1101F PT FALLS ASSESS-DOCD LE1/YR: CPT | Mod: CPTII,S$GLB,, | Performed by: PHYSICIAN ASSISTANT

## 2022-09-21 PROCEDURE — 1126F AMNT PAIN NOTED NONE PRSNT: CPT | Mod: CPTII,S$GLB,, | Performed by: PHYSICIAN ASSISTANT

## 2022-09-21 PROCEDURE — 99214 PR OFFICE/OUTPT VISIT, EST, LEVL IV, 30-39 MIN: ICD-10-PCS | Mod: S$GLB,,, | Performed by: PHYSICIAN ASSISTANT

## 2022-09-21 PROCEDURE — 99214 OFFICE O/P EST MOD 30 MIN: CPT | Mod: S$GLB,,, | Performed by: PHYSICIAN ASSISTANT

## 2022-09-21 PROCEDURE — 3288F PR FALLS RISK ASSESSMENT DOCUMENTED: ICD-10-PCS | Mod: CPTII,S$GLB,, | Performed by: PHYSICIAN ASSISTANT

## 2022-09-21 PROCEDURE — 1126F PR PAIN SEVERITY QUANTIFIED, NO PAIN PRESENT: ICD-10-PCS | Mod: CPTII,S$GLB,, | Performed by: PHYSICIAN ASSISTANT

## 2022-09-21 RX ORDER — AMITRIPTYLINE HYDROCHLORIDE 25 MG/1
25 TABLET, FILM COATED ORAL NIGHTLY
Qty: 30 TABLET | Refills: 3 | Status: SHIPPED | OUTPATIENT
Start: 2022-09-21 | End: 2022-12-07

## 2022-09-21 NOTE — PROGRESS NOTES
[facility]    Subjective:      Patient: Erica Masterson Patient PCP: Narayan Myers MD         :  1945     Sex:  female      MRN:  [id number]          Date of Visit: 2022      Chief Complaint: Possible oral thrush    Patient ID: Erica Masterson is a 76 y.o. female who presents to clinic for evaluation of possible oral thrush. Pt has history of papillary thyroid CA and underwent subtotal thyroidectomy 2021 and then had total thyroidectomy with bilateral paratracheal and superior mediastinal dissection with left modified neck dissection of levels II-Vb by ENT MD Dr. Ryan Torres 2021. Pt seen by family medicine 2022 with complaints of mouth discomfort. Pt did not appear to have oral thrush and was advised to take gabapentin 300mg TID as prescribed by in the past by Dr. Robles. Pt states she has had 2 prior episodes of oral thrush twice in the last few months treated with clotrimazole troches and nystatin mouth wash. Pt states that she has been doing good dental hygiene and has been using biotene at night. Pt states she has mild dry mouth, but she is able to salivate. Pt states that she has mild burning/stinging sensation on tongue. Pt denies fever/chills or dysphagia.     Past Medical History  She has a past medical history of Adenomatous polyp of colon, Allergy, Anxiety, Cancer, Cataract, Colon polyp, Degenerative arthritis of knee, Diverticulosis, Encounter for blood transfusion, GERD (gastroesophageal reflux disease), History of thyroid cancer, Hyperlipidemia, Hypertension, Lateral meniscal tear, Multinodular goiter, Myopathy, unspecified, and Tuberculosis.    Family History  Her family history includes Alcohol abuse in her son; Alzheimer's disease in her maternal uncle and sister; Breast cancer (age of onset: 60) in her sister; Cancer in her brother, brother, maternal grandmother, and mother; Colon cancer in an other family member; Dementia in her sister; Diabetes in her maternal aunt  and son; Emphysema in her father; Hyperlipidemia in her mother; Hypertension in her father, mother, and son; No Known Problems in her daughter; Obesity in her paternal uncle; Prostate cancer in an other family member.    Past Surgical History:   Procedure Laterality Date    BREAST BIOPSY Left 2015    benign    CHOLECYSTECTOMY      COLONOSCOPY  12/2/15    Dr. Britton, multiple polyps, recheck five years-three years if more than 2 polyps are adenomatous    COLONOSCOPY N/A 12/2/2015    COLONOSCOPY N/A 3/20/2019    Procedure: COLONOSCOPY;  Surgeon: Jose Britton MD;  Location: [facility];  Service: Endoscopy;  Laterality: N/A;    COLONOSCOPY N/A 5/20/2020    Dr. Britton; internal hemorrhoids; diverticulosis; polyps removed; repeat in 3 years    CYSTOSCOPY N/A 8/26/2020    Procedure: CYSTOSCOPY;  Surgeon: Charlotte Walters Jr., MD;  Location: [facility] OR;  Service: Urology;  Laterality: N/A;    DISSECTION OF NECK Left 9/13/2021    Procedure: DISSECTION, NECK;  Surgeon: Ryan Torres MD;  Location: [facility] OR 2ND FLR;  Service: ENT;  Laterality: Left;    ESOPHAGEAL MANOMETRY WITH MEASUREMENT OF IMPEDANCE N/A 8/22/2022    Procedure: MANOMETRY, ESOPHAGUS, WITH IMPEDANCE MEASUREMENT;  Surgeon: Pantera Mcguire MD;  Location: [facility] (4TH FLR);  Service: Endoscopy;  Laterality: N/A;  8/4 fully vaccinated; instructions mailed-st    ESOPHAGOGASTRODUODENOSCOPY N/A 5/20/2020    Dr. Britton; small hiatal hernia; gastritis; 8 gastric polyps removed    ESOPHAGOGASTRODUODENOSCOPY N/A 4/1/2022    Procedure: EGD (ESOPHAGOGASTRODUODENOSCOPY);  Surgeon: Jose Britton MD;  Location: [facility];  Service: Endoscopy;  Laterality: N/A;    FOOT SURGERY      HYSTERECTOMY      TVH/BSO > 20 years    INTRALUMINAL GASTROINTESTINAL TRACT IMAGING VIA CAPSULE N/A 6/4/2020    Procedure: IMAGING PROCEDURE, GI TRACT, INTRALUMINAL, VIA CAPSULE;  Surgeon: Jose Britton MD;  Location: [facility];  Service: Endoscopy;  Laterality: N/A;    KNEE SURGERY   06/06/2013    right knee tear Dr Iverson     LAPAROSCOPIC CHOLECYSTECTOMY N/A 9/17/2020    Procedure: CHOLECYSTECTOMY, LAPAROSCOPIC;  Surgeon: Forrest Veronica MD;  Location: [facility] OR;  Service: General;  Laterality: N/A;    LUNG LOBECTOMY  1966    right middle lobectomy, due to Tb    OOPHORECTOMY      SHOULDER SURGERY      francis     THYROIDECTOMY Bilateral 5/19/2021    Procedure: THYROIDECTOMY;  Surgeon: Jamia Amezquita MD;  [facility];  Service: General;  Laterality: Bilateral;    THYROIDECTOMY Bilateral 9/13/2021    Procedure: THYROIDECTOMY WITH INTRA-OP PTH;  Surgeon: Ryan Torres MD;  [facility];  Service: ENT;  Laterality: Bilateral;  NIM tube  Intraop PTH     Social History     Tobacco Use    Smoking status: Never    Smokeless tobacco: Never   Substance and Sexual Activity    Alcohol use: Not Currently     Comment: stopped 2019    Drug use: No    Sexual activity: Not Currently     Medications  She has a current medication list which includes the following prescription(s): amitriptyline, amlodipine, isopto tears, baclofen, blood glucose test, blood-glucose meter, calcium carbonate, conjugated estrogens, coq10 (ubiquinol), doxepin, duloxetine, ergocalciferol, gabapentin, levothyroxine, losartan, mirtazapine, rosuvastatin, and tobramycin-dexamethasone 0.3-0.1%.    Review of patient's allergies indicates:  No Known Allergies  All medications, allergies, and past history have been reviewed.    Objective:      Vitals:  Vitals - 1 value per visit 9/15/2022 9/21/2022 9/21/2022   SYSTOLIC 102 - -   DIASTOLIC 58 - -   Pulse 92 - -   Temp 98.6 - 98.2   Resp 15 - -   SPO2 98 - -   Weight (lb) 151.9 - 154.98   Weight (kg) 68.9 - 70.3   Height 65 - 66   BMI (Calculated) 25.3 - 25   VISIT REPORT - - -   Pain Score  - 0 -   Some recent data might be hidden       Body surface area is 1.81 meters squared.  Physical Exam  Constitutional:       General: She is not in acute distress.      Appearance: Normal appearance. She is not ill-appearing.   HENT:      Head: Normocephalic and atraumatic.      Right Ear: Tympanic membrane, ear canal and external ear normal.      Left Ear: Tympanic membrane, ear canal and external ear normal.      Nose: Nose normal.      Mouth/Throat:      Lips: Pink. No lesions.      Mouth: Mucous membranes are moist. No oral lesions.      Tongue: No lesions.      Palate: No lesions.      Pharynx: Oropharynx is clear. Uvula midline. No pharyngeal swelling, oropharyngeal exudate, posterior oropharyngeal erythema or uvula swelling.     Eyes:      General:         Right eye: No discharge.         Left eye: No discharge.      Extraocular Movements: Extraocular movements intact.      Conjunctiva/sclera: Conjunctivae normal.   Pulmonary:      Effort: Pulmonary effort is normal.   Lymphadenopathy:      Cervical: No cervical adenopathy.   Neurological:      General: No focal deficit present.      Mental Status: She is alert and oriented to person, place, and time. Mental status is at baseline.   Psychiatric:         Mood and Affect: Mood normal.         Behavior: Behavior normal.         Thought Content: Thought content normal.         Judgment: Judgment normal.     Labs:  WBC   Date Value Ref Range Status   08/17/2022 3.26 (L) 3.90 - 12.70 K/uL Final     Eosinophil %   Date Value Ref Range Status   08/17/2022 3.4 0.0 - 8.0 % Final     Eos #   Date Value Ref Range Status   08/17/2022 0.1 0.0 - 0.5 K/uL Final     Platelets   Date Value Ref Range Status   08/17/2022 234 150 - 450 K/uL Final     Glucose   Date Value Ref Range Status   08/30/2022 92 70 - 110 mg/dL Final     All lab results, imaging results, and data have been reviewed.    Assessment:        ICD-10-CM ICD-9-CM   1. Burning mouth syndrome  K14.6 529.6            Plan:      Burning mouth syndrome  -     No oral thrush seen today in office. Stop oral peroxide use. Continue gabapentin 300mg TID. Start amitriptyline (ELAVIL) 25 MG  tablet; Take 1 tablet (25 mg total) by mouth every evening. Follow up in 1 month to re-assess burning tongue issues.

## 2022-09-21 NOTE — TELEPHONE ENCOUNTER
----- Message from Juan M Landrum MD sent at 9/21/2022  2:49 PM CDT -----  Please call pt to let her know:   1. Thyroglobulin is stable.   2. Thyroid ultrasound did not find any concerning abnormalities at this time. Can review further and determine timing for a repeat at her appointment next month (set for 10/4, will need to reschedule, okay to use a 930am or 1pm admin slot if needed).   3. Already has labs set for 9/30/2022 to re-evaluate thyroid levels before her appointment.

## 2022-09-22 ENCOUNTER — CLINICAL SUPPORT (OUTPATIENT)
Dept: REHABILITATION | Facility: HOSPITAL | Age: 77
End: 2022-09-22
Payer: MEDICARE

## 2022-09-22 DIAGNOSIS — R29.3 POOR POSTURE: Primary | ICD-10-CM

## 2022-09-22 DIAGNOSIS — R29.898 DECREASED STRENGTH OF UPPER EXTREMITY: ICD-10-CM

## 2022-09-22 PROCEDURE — 97140 MANUAL THERAPY 1/> REGIONS: CPT | Mod: KX,PN,CQ

## 2022-09-22 PROCEDURE — 97110 THERAPEUTIC EXERCISES: CPT | Mod: KX,PN,CQ

## 2022-09-22 PROCEDURE — 97112 NEUROMUSCULAR REEDUCATION: CPT | Mod: KX,PN,CQ

## 2022-09-22 NOTE — PROGRESS NOTES
"[facility]   Physical Therapy Treatment Note     Name: Erica Masterson  Clinic Number: [id number]    Therapy Diagnosis:   Encounter Diagnoses   Name Primary?    Poor posture Yes    Decreased strength of upper extremity      Physician: Eunice Alvarez,*    Visit Date: 9/22/2022    Physician Orders: PT Eval and Treat  Medical Diagnosis from Referral: G24.3 (ICD-10-CM) - Cervical dystonia  Evaluation Date: 9/16/2022  Authorization Period Expiration: 10/14/2022  Plan of Care Expiration: 12/9/2022  Progress Note Due: 10/16/2022  Visit # / Visits authorized: 2/ 11   FOTO: 1/3     Precautions: Standard, Diabetes, and cancer     PTA Visit #: 2/5     Time In: 0800  Time Out: 0900  Total Billable Time: 55 minutes    SUBJECTIVE     Pt reports: Arrives with no pain "just a little tightness in her left cervical region." She reports increased discomfort after her last treatment session. She did not take her kinesiotape off and denies increased pain from it as a result. She is also with reports of improved ability to do house hold chores with less pain and difficulty.     She was compliant with home exercise program.  Response to previous treatment: improvement in ADL's with less pain   Functional change: non-remarkable     Pain: 0/10  Location: left neck      OBJECTIVE     Objective Measures updated at progress report unless specified.     Treatment     Erica received the treatments listed below:      therapeutic exercises to develop strength, endurance, ROM, flexibility, and posture for 25 minutes including:    Seated thoracic extension x 20 repetitions   Open books x 20 repetitions bilaterally   Shoulder shrugs 3 pounds 2 x 15 repetitions   Land mines x 20 repetitions with lower upper extremity   Touch downs x 20 repetitions with more focus on Left upper trap    NP:  KT applied to left UT 1 I strip for facilitation 30% tension     manual therapy techniques: Joint mobilizations and Soft tissue " Mobilization were applied to the: cervical spine for 10 minutes, including:    Sub occipital release  Upper cervical stretching to improve flexion     neuromuscular re-education activities to improve: Coordination, Kinesthetic, Sense, Proprioception, and Posture for 20 minutes. The following activities were included:    Prone row 3 pounds 2 x 15 repetitions   Sidelying with hand resting on dowel performing scapular retraction 5 seconds holds x 30 repetitions   Prone scapular setting 5 seconds holds x 30 repetitions   Prone lower trap isometrics 5 seconds holds x 20 repetitions   Chin tucks with towel under occiput 5 seconds holds x 20 repetitions   Chin tucks with towel under CT junction 5 seconds holds x 20 repetitions           Patient Education and Home Exercises     Home Exercises Provided and Patient Education Provided     Education provided:   - Home Exercise Program   - Kinesiotape   -Effects of periscapular weakness on cervical spine    Written Home Exercises Provided: yes. Exercises were reviewed and Erica was able to demonstrate them prior to the end of the session.  Erica demonstrated good  understanding of the education provided. See EMR under Patient Instructions for exercises provided during therapy sessions    ASSESSMENT     Periscapular exercises were performed prior to row. Patient noted with less dyskinesia as compared to previously. She presents with left shoulder depression but improve UT activation. Erica will continue to benefit from skilled Physical Therapy to improve periscapular strengthening and reduce stress to cervical spine.       Erica Is progressing well towards her goals.   Pt prognosis is Fair.     Pt will continue to benefit from skilled outpatient physical therapy to address the deficits listed in the problem list box on initial evaluation, provide pt/family education and to maximize pt's level of independence in the home and community environment.     Pt's spiritual, cultural and  educational needs considered and pt agreeable to plan of care and goals.     Anticipated barriers to physical therapy:  co morbidities, history of multiple rounds of therapy for same diagnosis     Goals:     STG  Weeks/Visits Date Established  Goal Status    1.Patient will increase cervical AROM by 10 deg in all directions to improve functional mobility  4 weeks/ 8 visits  9/16/2022    In progress 9/22/2022    2. Patient will report </= 5/10 pain in the morning for at worst pain to improve activity tolerance  4 weeks/ 8 visits  9/16/2022     In progress 9/22/2022   3.Patient will report >/= 35% improvement in tightness and function since evaluation to improve quality of life  4 weeks/ 8 visits  9/16/2022     In progress 9/22/2022   4. Pt will demonstrate and verbalize compliance and independence with HEP to improve therapy outcomes  4 weeks/ 8 visits  9/16/2022     In progress 9/22/2022   5.Patient will report </= 40% limitation on FOTO to improve activity participation  4 weeks/ 8 visits  9/16/2022     In progress 9/22/2022      LTG Weeks/Visits Date Established  Goal Status    1.Patient will report </= 3/10 pain in the morning for at worst pain to improve activity tolerance  8 weeks/ 12 visits  9/16/2022        2. Patient will report >/= 50% improvement in tightness and function since evaluation to improve quality of life  8 weeks/ 12 visits  9/16/2022           3.Patient will report </= 32% limitation on FOTO to improve activity participation  8 weeks/ 12 visits  9/16/2022        4. Patient will increase BUE strength to >/= 4/5 to improve ability to lift objects overhead  8 weeks/ 12 visits  9/16/2022        5. Patient will report a decrease in tenderness to palpation of cervical musculature since evaluation to improve ability to carry out household chores  8 weeks/ 12 visits  9/16/2022            PLAN     Progress periscapular and cervical strengthening as able.     Ananya Tam, PTA

## 2022-09-23 ENCOUNTER — LAB VISIT (OUTPATIENT)
Dept: LAB | Facility: HOSPITAL | Age: 77
End: 2022-09-23
Payer: MEDICARE

## 2022-09-23 DIAGNOSIS — R14.0 BLOATING: ICD-10-CM

## 2022-09-23 PROCEDURE — 87338 HPYLORI STOOL AG IA: CPT

## 2022-09-26 ENCOUNTER — CLINICAL SUPPORT (OUTPATIENT)
Dept: REHABILITATION | Facility: HOSPITAL | Age: 77
End: 2022-09-26
Attending: PHYSICAL MEDICINE & REHABILITATION
Payer: MEDICARE

## 2022-09-26 ENCOUNTER — TELEPHONE (OUTPATIENT)
Dept: ENDOCRINOLOGY | Facility: CLINIC | Age: 77
End: 2022-09-26
Payer: MEDICARE

## 2022-09-26 DIAGNOSIS — R29.898 DECREASED STRENGTH OF UPPER EXTREMITY: ICD-10-CM

## 2022-09-26 DIAGNOSIS — R29.3 POOR POSTURE: Primary | ICD-10-CM

## 2022-09-26 PROCEDURE — 97112 NEUROMUSCULAR REEDUCATION: CPT | Mod: KX,PN,CQ

## 2022-09-26 PROCEDURE — 97140 MANUAL THERAPY 1/> REGIONS: CPT | Mod: KX,PN,CQ

## 2022-09-26 PROCEDURE — 97110 THERAPEUTIC EXERCISES: CPT | Mod: KX,PN,CQ

## 2022-09-26 NOTE — PROGRESS NOTES
[facility]   Physical Therapy Treatment Note     Name: Erica Masterson  [facility] Number: [id number]    Therapy Diagnosis:   Encounter Diagnoses   Name Primary?    Poor posture Yes    Decreased strength of upper extremity      Physician: Eunice Alvarez,*    Visit Date: 9/26/2022    Physician Orders: PT Eval and Treat  Medical Diagnosis from Referral: G24.3 (ICD-10-CM) - Cervical dystonia  Evaluation Date: 9/16/2022  Authorization Period Expiration: 10/14/2022  Plan of Care Expiration: 12/9/2022  Progress Note Due: 10/16/2022  Visit # / Visits authorized: 3/ 11   FOTO: 1/3     Precautions: Standard, Diabetes, and cancer     PTA Visit #: 3/5     Time In: 0830  Time Out: 0930   Total Billable Time: 55 minutes    SUBJECTIVE     Pt reports: Continues with mild tightness upon waking up in the morning but otherwise doing well. She reports she can complete full shoulder flexion now without her arm shaking which is an improvement.     She was compliant with home exercise program.  Response to previous treatment: improvement in ADL's with less pain   Functional change: non-remarkable     Pain: 0/10  Location: left neck      OBJECTIVE     Objective Measures updated at progress report unless specified.     Treatment     Erica received the treatments listed below:      therapeutic exercises to develop strength, endurance, ROM, flexibility, and posture for 25 minutes including:    Seated thoracic extension x 20 repetitions   Open books x 20 repetitions bilaterally   Shoulder shrugs 3 pounds 2 x 15 repetitions   Land mines x 20 repetitions with lower upper extremity   Touch downs level 2,  x 20 repetitions with more focus on Left upper trap    KT applied to left UT 1 I strip for facilitation 30% tension     manual therapy techniques: Joint mobilizations and Soft tissue Mobilization were applied to the: cervical spine for 10 minutes, including:    Sub occipital release  Upper cervical stretching to  improve flexion     neuromuscular re-education activities to improve: Coordination, Kinesthetic, Sense, Proprioception, and Posture for 20 minutes. The following activities were included:    Prone row 3 pounds 2 x 15 repetitions   Sidelying with hand resting on dowel performing scapular retraction 5 seconds holds x 30 repetitions   Prone scapular setting 5 seconds holds x 30 repetitions   Prone lower trap level 1 3 seconds holds x 20 repetitions   Prone mid trap level 1 3 seconds holds x 20 repetitions     Chin tucks with towel under occiput 5 seconds holds x 20 repetitions   Chin tucks with towel under CT junction 5 seconds holds x 20 repetitions           Patient Education and Home Exercises     Home Exercises Provided and Patient Education Provided     Education provided:   - Home Exercise Program   - Kinesiotape   -Effects of periscapular weakness on cervical spine    Written Home Exercises Provided: yes. Exercises were reviewed and Erica was able to demonstrate them prior to the end of the session.  Erica demonstrated good  understanding of the education provided. See EMR under Patient Instructions for exercises provided during therapy sessions    ASSESSMENT     Patient arrived to clinic with improvement in symptomology. Was able to progress a few exercises without issues. Per patients request KT was reapplied. Patient is making good progress in therapy evident by subjective reporting. She will continue to benefit from skilled Physical Therapy to maximize strength gains.     Erica Is progressing well towards her goals.   Pt prognosis is Fair.     Pt will continue to benefit from skilled outpatient physical therapy to address the deficits listed in the problem list box on initial evaluation, provide pt/family education and to maximize pt's level of independence in the home and community environment.     Pt's spiritual, cultural and educational needs considered and pt agreeable to plan of care and goals.      Anticipated barriers to physical therapy:  co morbidities, history of multiple rounds of therapy for same diagnosis     Goals:     STG  Weeks/Visits Date Established  Goal Status    1.Patient will increase cervical AROM by 10 deg in all directions to improve functional mobility  4 weeks/ 8 visits  9/16/2022    In progress 9/26/2022    2. Patient will report </= 5/10 pain in the morning for at worst pain to improve activity tolerance  4 weeks/ 8 visits  9/16/2022     In progress 9/26/2022   3.Patient will report >/= 35% improvement in tightness and function since evaluation to improve quality of life  4 weeks/ 8 visits  9/16/2022     In progress 9/26/2022   4. Pt will demonstrate and verbalize compliance and independence with HEP to improve therapy outcomes  4 weeks/ 8 visits  9/16/2022     In progress 9/26/2022   5.Patient will report </= 40% limitation on FOTO to improve activity participation  4 weeks/ 8 visits  9/16/2022     In progress 9/26/2022      LTG Weeks/Visits Date Established  Goal Status    1.Patient will report </= 3/10 pain in the morning for at worst pain to improve activity tolerance  8 weeks/ 12 visits  9/16/2022        2. Patient will report >/= 50% improvement in tightness and function since evaluation to improve quality of life  8 weeks/ 12 visits  9/16/2022           3.Patient will report </= 32% limitation on FOTO to improve activity participation  8 weeks/ 12 visits  9/16/2022        4. Patient will increase BUE strength to >/= 4/5 to improve ability to lift objects overhead  8 weeks/ 12 visits  9/16/2022        5. Patient will report a decrease in tenderness to palpation of cervical musculature since evaluation to improve ability to carry out household chores  8 weeks/ 12 visits  9/16/2022            PLAN     Progress periscapular and cervical strengthening as able.     Ananya Tam, PTA

## 2022-09-26 NOTE — TELEPHONE ENCOUNTER
----- Message from Tim Mckeon sent at 9/26/2022  1:20 PM CDT -----  Type:  Patient Returning Call    Who Called:  Patient  Who Left Message for Patient:  Anand  Does the patient know what this is regarding?:  Rescheduling-- Soonest is 04/16/22  Best Call Back Number:  [phone]  Additional Information:

## 2022-09-27 ENCOUNTER — HOSPITAL ENCOUNTER (OUTPATIENT)
Dept: RADIOLOGY | Facility: HOSPITAL | Age: 77
Discharge: HOME | End: 2022-09-27
Attending: FAMILY MEDICINE
Payer: MEDICARE

## 2022-09-27 DIAGNOSIS — R92.8 ABNORMALITY OF LEFT BREAST ON SCREENING MAMMOGRAM: Primary | ICD-10-CM

## 2022-09-27 DIAGNOSIS — R92.8 ABNORMALITY OF LEFT BREAST ON SCREENING MAMMOGRAM: ICD-10-CM

## 2022-09-27 PROCEDURE — 77065 MAMMO DIGITAL DIAGNOSTIC LEFT WITH TOMO: ICD-10-PCS | Mod: 26,LT,, | Performed by: RADIOLOGY

## 2022-09-27 PROCEDURE — 76642 ULTRASOUND BREAST LIMITED: CPT | Mod: 26,LT,, | Performed by: RADIOLOGY

## 2022-09-27 PROCEDURE — 77065 DX MAMMO INCL CAD UNI: CPT | Mod: TC,LT

## 2022-09-27 PROCEDURE — 77061 MAMMO DIGITAL DIAGNOSTIC LEFT WITH TOMO: ICD-10-PCS | Mod: 26,LT,, | Performed by: RADIOLOGY

## 2022-09-27 PROCEDURE — 76642 ULTRASOUND BREAST LIMITED: CPT | Mod: TC,LT

## 2022-09-27 PROCEDURE — 77061 BREAST TOMOSYNTHESIS UNI: CPT | Mod: 26,LT,, | Performed by: RADIOLOGY

## 2022-09-27 PROCEDURE — 76642 US BREAST LEFT LIMITED: ICD-10-PCS | Mod: 26,LT,, | Performed by: RADIOLOGY

## 2022-09-27 PROCEDURE — 77065 DX MAMMO INCL CAD UNI: CPT | Mod: 26,LT,, | Performed by: RADIOLOGY

## 2022-09-29 ENCOUNTER — CLINICAL SUPPORT (OUTPATIENT)
Dept: REHABILITATION | Facility: HOSPITAL | Age: 77
End: 2022-09-29
Attending: PHYSICAL MEDICINE & REHABILITATION
Payer: MEDICARE

## 2022-09-29 DIAGNOSIS — R29.898 DECREASED STRENGTH OF UPPER EXTREMITY: ICD-10-CM

## 2022-09-29 DIAGNOSIS — R29.3 POOR POSTURE: Primary | ICD-10-CM

## 2022-09-29 PROCEDURE — 97112 NEUROMUSCULAR REEDUCATION: CPT | Mod: KX,PN

## 2022-09-29 PROCEDURE — 97110 THERAPEUTIC EXERCISES: CPT | Mod: KX,PN

## 2022-09-29 NOTE — PROGRESS NOTES
[facility]   Physical Therapy Treatment Note     Name: Erica Masterson  Clinic Number: [id number]    Therapy Diagnosis:   Encounter Diagnoses   Name Primary?    Poor posture Yes    Decreased strength of upper extremity      Physician: Eunice Alvarez,*    Visit Date: 9/29/2022    Physician Orders: PT Eval and Treat  Medical Diagnosis from Referral: G24.3 (ICD-10-CM) - Cervical dystonia  Evaluation Date: 9/16/2022  Authorization Period Expiration: 10/14/2022  Plan of Care Expiration: 12/9/2022  Progress Note Due: 10/16/2022  Visit # / Visits authorized: 4/11   FOTO: 1/3     Precautions: Standard, Diabetes, and cancer     PTA Visit #: 0/5     Time In: 0800  Time Out: 0857  Total Billable Time: 27 minutes    SUBJECTIVE     Pt reports: feels like she is making improvements, sleeping better at night     She was compliant with home exercise program.  Response to previous treatment: improvement in ADL's with less pain   Functional change: non-remarkable     Pain: 0/10  Location: left neck      OBJECTIVE     Objective Measures updated at progress report unless specified.     Treatment   57 minute treatment 27 minutes spent 1 on 1   Erica received the treatments listed below:      therapeutic exercises to develop strength, endurance, ROM, flexibility, and posture for 25 minutes including:    Seated thoracic extension x 20 repetitions   Open books x 20 repetitions bilaterally   Touch downs level 2,  [id number] with more focus on Left upper trap    KT applied to left Rhomboid/mid trap and lower trap 1 I strip for facilitation 30% tension applied by Ananya Tam, PTA    Not today:   Shoulder shrugs 3 pounds 2 x 15 repetitions   Land mines x 20 repetitions with lower upper extremity     manual therapy techniques: Joint mobilizations and Soft tissue Mobilization were applied to the: cervical spine for 10 minutes, including:    CT gapping mobilizations grade III   Thoracic PA  mobilizations grade III   O-A flexion mobs   Sub occipital release    neuromuscular re-education activities to improve: Coordination, Kinesthetic, Sense, Proprioception, and Posture for 20 minutes. The following activities were included:    Prone lower trap level 1 3 seconds holds x 20 repetitions   Prone mid trap level 1 3 seconds holds x 20 repetitions   Supine chin tuck x20 with 5s hold   Supine chin tuck with head lift 2x10 with 5s hold     Not today:  Prone row 3 pounds 2 x 15 repetitions   Sidelying with hand resting on dowel performing scapular retraction 5 seconds holds x 30 repetitions   Prone scapular setting 5 seconds holds x 30 repetitions     Chin tucks with towel under occiput 5 seconds holds x 20 repetitions   Chin tucks with towel under CT junction 5 seconds holds x 20 repetitions           Patient Education and Home Exercises     Home Exercises Provided and Patient Education Provided     Education provided:   - Home Exercise Program   - Kinesiotape   -Effects of periscapular weakness on cervical spine    Written Home Exercises Provided: yes. Exercises were reviewed and Erica was able to demonstrate them prior to the end of the session.  Erica demonstrated good  understanding of the education provided. See EMR under Patient Instructions for exercises provided during therapy sessions    ASSESSMENT     Erica continues to report improvement. We focused on improving her CT junction and thoracic mobility, deep neck flexor strength/endurance, and strengthening her trapezius musculature. She has difficulty with deep neck flexor endurance. Will progress as able.     Erica Is progressing well towards her goals.   Pt prognosis is Fair.     Pt will continue to benefit from skilled outpatient physical therapy to address the deficits listed in the problem list box on initial evaluation, provide pt/family education and to maximize pt's level of independence in the home and community environment.     Pt's spiritual,  cultural and educational needs considered and pt agreeable to plan of care and goals.     Anticipated barriers to physical therapy:  co morbidities, history of multiple rounds of therapy for same diagnosis     Goals:     STG  Weeks/Visits Date Established  Goal Status    1.Patient will increase cervical AROM by 10 deg in all directions to improve functional mobility  4 weeks/ 8 visits  9/16/2022    In progress 9/29/2022    2. Patient will report </= 5/10 pain in the morning for at worst pain to improve activity tolerance  4 weeks/ 8 visits  9/16/2022     In progress 9/29/2022   3.Patient will report >/= 35% improvement in tightness and function since evaluation to improve quality of life  4 weeks/ 8 visits  9/16/2022     In progress 9/29/2022   4. Pt will demonstrate and verbalize compliance and independence with HEP to improve therapy outcomes  4 weeks/ 8 visits  9/16/2022     In progress 9/29/2022   5.Patient will report </= 40% limitation on FOTO to improve activity participation  4 weeks/ 8 visits  9/16/2022     In progress 9/29/2022      LTG Weeks/Visits Date Established  Goal Status    1.Patient will report </= 3/10 pain in the morning for at worst pain to improve activity tolerance  8 weeks/ 12 visits  9/16/2022        2. Patient will report >/= 50% improvement in tightness and function since evaluation to improve quality of life  8 weeks/ 12 visits  9/16/2022           3.Patient will report </= 32% limitation on FOTO to improve activity participation  8 weeks/ 12 visits  9/16/2022        4. Patient will increase BUE strength to >/= 4/5 to improve ability to lift objects overhead  8 weeks/ 12 visits  9/16/2022        5. Patient will report a decrease in tenderness to palpation of cervical musculature since evaluation to improve ability to carry out household chores  8 weeks/ 12 visits  9/16/2022            PLAN     Progress periscapular and cervical strengthening as able.     Noah Sanchez, PT

## 2022-09-30 ENCOUNTER — LAB VISIT (OUTPATIENT)
Dept: LAB | Facility: HOSPITAL | Age: 77
End: 2022-09-30
Attending: INTERNAL MEDICINE
Payer: MEDICARE

## 2022-09-30 DIAGNOSIS — C73 MALIGNANT NEOPLASM OF THYROID GLAND: ICD-10-CM

## 2022-09-30 DIAGNOSIS — M85.88 OSTEOPENIA OF LUMBAR SPINE: ICD-10-CM

## 2022-09-30 DIAGNOSIS — E89.0 POST-SURGICAL HYPOTHYROIDISM: ICD-10-CM

## 2022-09-30 LAB
ALBUMIN SERPL BCP-MCNC: 3.7 G/DL (ref 3.5–5.2)
ALP SERPL-CCNC: 46 U/L (ref 55–135)
ALT SERPL W/O P-5'-P-CCNC: 12 U/L (ref 10–44)
ANION GAP SERPL CALC-SCNC: 8 MMOL/L (ref 8–16)
AST SERPL-CCNC: 15 U/L (ref 10–40)
BILIRUB SERPL-MCNC: 0.7 MG/DL (ref 0.1–1)
BUN SERPL-MCNC: 18 MG/DL (ref 8–23)
CALCIUM SERPL-MCNC: 9 MG/DL (ref 8.7–10.5)
CHLORIDE SERPL-SCNC: 104 MMOL/L (ref 95–110)
CO2 SERPL-SCNC: 30 MMOL/L (ref 23–29)
CREAT SERPL-MCNC: 1 MG/DL (ref 0.5–1.4)
EST. GFR  (NO RACE VARIABLE): 57.7 ML/MIN/1.73 M^2
GLUCOSE SERPL-MCNC: 100 MG/DL (ref 70–110)
POTASSIUM SERPL-SCNC: 4.5 MMOL/L (ref 3.5–5.1)
PROT SERPL-MCNC: 6.6 G/DL (ref 6–8.4)
SODIUM SERPL-SCNC: 142 MMOL/L (ref 136–145)
T4 FREE SERPL-MCNC: 1.01 NG/DL (ref 0.71–1.51)
TSH SERPL DL<=0.005 MIU/L-ACNC: 0.05 UIU/ML (ref 0.4–4)

## 2022-09-30 PROCEDURE — 86800 THYROGLOBULIN ANTIBODY: CPT | Performed by: INTERNAL MEDICINE

## 2022-09-30 PROCEDURE — 84439 ASSAY OF FREE THYROXINE: CPT | Performed by: INTERNAL MEDICINE

## 2022-09-30 PROCEDURE — 80053 COMPREHEN METABOLIC PANEL: CPT | Performed by: INTERNAL MEDICINE

## 2022-09-30 PROCEDURE — 84443 ASSAY THYROID STIM HORMONE: CPT | Performed by: INTERNAL MEDICINE

## 2022-09-30 PROCEDURE — 36415 COLL VENOUS BLD VENIPUNCTURE: CPT | Mod: PO | Performed by: INTERNAL MEDICINE

## 2022-10-01 LAB
THRYOGLOBULIN INTERPRETATION: ABNORMAL
THYROGLOB AB SERPL-ACNC: <1.8 IU/ML
THYROGLOB SERPL-MCNC: 7.6 NG/ML

## 2022-10-03 ENCOUNTER — CLINICAL SUPPORT (OUTPATIENT)
Dept: REHABILITATION | Facility: HOSPITAL | Age: 77
End: 2022-10-03
Attending: PHYSICAL MEDICINE & REHABILITATION
Payer: MEDICARE

## 2022-10-03 DIAGNOSIS — E55.9 VITAMIN D INSUFFICIENCY: ICD-10-CM

## 2022-10-03 DIAGNOSIS — R29.3 POOR POSTURE: Primary | ICD-10-CM

## 2022-10-03 DIAGNOSIS — M85.88 OSTEOPENIA OF LUMBAR SPINE: ICD-10-CM

## 2022-10-03 DIAGNOSIS — R29.898 DECREASED STRENGTH OF UPPER EXTREMITY: ICD-10-CM

## 2022-10-03 LAB
H PYLORI AG STL QL IA: NOT DETECTED
SPECIMEN SOURCE: NORMAL

## 2022-10-03 PROCEDURE — 97110 THERAPEUTIC EXERCISES: CPT | Mod: KX,PN,CQ

## 2022-10-03 PROCEDURE — 97112 NEUROMUSCULAR REEDUCATION: CPT | Mod: KX,PN,CQ

## 2022-10-03 NOTE — TELEPHONE ENCOUNTER
----- Message from Dc Alvarez sent at 10/3/2022  1:58 PM CDT -----  Contact: pt  Type:  RX Refill Request  Who Called:  pt   Refill or New Rx:  new  RX Name and Strength:  vit. D tablet  How is the patient currently taking it? (ex. 1XDay):  1xmonth  Preferred Pharmacy with phone number:    [id number] - [place], [place] - [address] [place] [address]  Phone: [phone] Fax: [phone]  Local or Mail Order:  local  Best Call Back Number:  [phone]  Additional Information:  please call pt to advise

## 2022-10-03 NOTE — PROGRESS NOTES
[facility]   Physical Therapy Treatment Note     Name: Erica Masterson  [facility] Number: [id number]    Therapy Diagnosis:   Encounter Diagnoses   Name Primary?    Poor posture Yes    Decreased strength of upper extremity      Physician: Eunice Alvarez,*    Visit Date: 10/3/2022    Physician Orders: PT Eval and Treat  Medical Diagnosis from Referral: G24.3 (ICD-10-CM) - Cervical dystonia  Evaluation Date: 9/16/2022  Authorization Period Expiration: 10/14/2022  Plan of Care Expiration: 12/9/2022  Progress Note Due: 10/16/2022  Visit # / Visits authorized: 5/11   FOTO: 1/3     Precautions: Standard, Diabetes, and cancer     PTA Visit #: 1/5     Time In: 0830  Time Out: 0930  Total Billable Time: 27 minutes    SUBJECTIVE     Pt reports: She denies any pain upon arrival.  Reports she woke up with throat dryness this morning and wondered if it could have been the KT.     She was compliant with home exercise program.  Response to previous treatment: improvement in ADL's with less pain   Functional change: non-remarkable     Pain: 0/10  Location: left neck      OBJECTIVE     Objective Measures updated at progress report unless specified.     Treatment   57 minute treatment 27 minutes spent 1 on 1   Erica received the treatments listed below:      therapeutic exercises to develop strength, endurance, ROM, flexibility, and posture for 25 minutes including:    Seated thoracic extension x 20 repetitions   Open books x 20 repetitions bilaterally   Touch downs level 2,  [id number] with more focus on Left upper trap  Shoulder shrugs 3 pounds 2 x 15 repetitions     KT applied to left Rhomboid/mid trap and lower trap 1 I strip for facilitation 30% tension applied by Ananya Tam PTA    Not today:     Land mines x 20 repetitions with lower upper extremity     manual therapy techniques: Joint mobilizations and Soft tissue Mobilization were applied to the: cervical spine for 10 minutes,  including:    CT gapping mobilizations grade III   Thoracic PA mobilizations grade III   O-A flexion mobs   Sub occipital release    neuromuscular re-education activities to improve: Coordination, Kinesthetic, Sense, Proprioception, and Posture for 20 minutes. The following activities were included:    Prone lower trap level 1 3 seconds holds x 20 repetitions   Prone mid trap level 1 3 seconds holds x 20 repetitions   Supine chin tuck x20 with 5s hold   Chin tucks with towel under CT junction 5 seconds holds x 20 repetitions   Supine chin tuck with head lift 2x10 with 5s hold     Not today:  Prone row 3 pounds 2 x 15 repetitions   Sidelying with hand resting on dowel performing scapular retraction 5 seconds holds x 30 repetitions   Prone scapular setting 5 seconds holds x 30 repetitions     Chin tucks with towel under occiput 5 seconds holds x 20 repetitions           Patient Education and Home Exercises     Home Exercises Provided and Patient Education Provided     Education provided:   - Home Exercise Program   - Kinesiotape   -Effects of periscapular weakness on cervical spine    Written Home Exercises Provided: yes. Exercises were reviewed and Erica was able to demonstrate them prior to the end of the session.  Erica demonstrated good  understanding of the education provided. See EMR under Patient Instructions for exercises provided during therapy sessions    ASSESSMENT     Erica tolerated treatment well this date without issues as she was able to perform all recommended exercises well. Continues to present with some scapular dyskinesia and improves with cueing. Patient will continue to benefit from skilled Physical Therapy to improve continues strength deficits to allow her to return to recreational activities without pain or limitations.        Erica Is progressing well towards her goals.   Pt prognosis is Fair.     Pt will continue to benefit from skilled outpatient physical therapy to address the deficits listed  in the problem list box on initial evaluation, provide pt/family education and to maximize pt's level of independence in the home and community environment.     Pt's spiritual, cultural and educational needs considered and pt agreeable to plan of care and goals.     Anticipated barriers to physical therapy:  co morbidities, history of multiple rounds of therapy for same diagnosis     Goals:     STG  Weeks/Visits Date Established  Goal Status    1.Patient will increase cervical AROM by 10 deg in all directions to improve functional mobility  4 weeks/ 8 visits  9/16/2022    In progress 10/3/2022    2. Patient will report </= 5/10 pain in the morning for at worst pain to improve activity tolerance  4 weeks/ 8 visits  9/16/2022     In progress 10/3/2022   3.Patient will report >/= 35% improvement in tightness and function since evaluation to improve quality of life  4 weeks/ 8 visits  9/16/2022     In progress 10/3/2022   4. Pt will demonstrate and verbalize compliance and independence with HEP to improve therapy outcomes  4 weeks/ 8 visits  9/16/2022     In progress 10/3/2022   5.Patient will report </= 40% limitation on FOTO to improve activity participation  4 weeks/ 8 visits  9/16/2022     In progress 10/3/2022      LTG Weeks/Visits Date Established  Goal Status    1.Patient will report </= 3/10 pain in the morning for at worst pain to improve activity tolerance  8 weeks/ 12 visits  9/16/2022        2. Patient will report >/= 50% improvement in tightness and function since evaluation to improve quality of life  8 weeks/ 12 visits  9/16/2022           3.Patient will report </= 32% limitation on FOTO to improve activity participation  8 weeks/ 12 visits  9/16/2022        4. Patient will increase BUE strength to >/= 4/5 to improve ability to lift objects overhead  8 weeks/ 12 visits  9/16/2022        5. Patient will report a decrease in tenderness to palpation of cervical musculature since evaluation to improve ability  to carry out household chores  8 weeks/ 12 visits  9/16/2022            PLAN     Progress periscapular and cervical strengthening as able.     Ananya Tam, PTA

## 2022-10-04 RX ORDER — ERGOCALCIFEROL 1.25 MG/1
50000 CAPSULE ORAL
Qty: 3 CAPSULE | Refills: 3 | Status: SHIPPED | OUTPATIENT
Start: 2022-10-04 | End: 2023-10-31 | Stop reason: SDUPTHER

## 2022-10-05 ENCOUNTER — TELEPHONE (OUTPATIENT)
Dept: GASTROENTEROLOGY | Facility: CLINIC | Age: 77
End: 2022-10-05
Payer: MEDICARE

## 2022-10-05 NOTE — TELEPHONE ENCOUNTER
----- Message from Jose Britton MD sent at 10/5/2022 12:34 PM CDT -----  Regarding: RE: gas meds  OTC Phazyme or Gas X.      ----- Message -----  From: Lindsay Ramirez LPN  Sent: 10/5/2022  11:59 AM CDT  To: Jose Britton MD  Subject: gas meds                                         Rescheduled this patient to early next month. She requested medicine for gas she is having at night.

## 2022-10-07 ENCOUNTER — CLINICAL SUPPORT (OUTPATIENT)
Dept: REHABILITATION | Facility: HOSPITAL | Age: 77
End: 2022-10-07
Attending: PHYSICAL MEDICINE & REHABILITATION
Payer: MEDICARE

## 2022-10-07 DIAGNOSIS — R29.3 POOR POSTURE: Primary | ICD-10-CM

## 2022-10-07 DIAGNOSIS — R29.898 DECREASED STRENGTH OF UPPER EXTREMITY: ICD-10-CM

## 2022-10-07 PROCEDURE — 97110 THERAPEUTIC EXERCISES: CPT | Mod: PN,CQ

## 2022-10-07 PROCEDURE — 97112 NEUROMUSCULAR REEDUCATION: CPT | Mod: PN,CQ

## 2022-10-07 NOTE — PROGRESS NOTES
[facility]   Physical Therapy Treatment Note     Name: Erica Masterson  Clinic Number: [id number]    Therapy Diagnosis:   Encounter Diagnoses   Name Primary?    Poor posture Yes    Decreased strength of upper extremity      Physician: Eunice Alvarez,*    Visit Date: 10/7/2022    Physician Orders: PT Eval and Treat  Medical Diagnosis from Referral: G24.3 (ICD-10-CM) - Cervical dystonia  Evaluation Date: 9/16/2022  Authorization Period Expiration: 10/14/2022  Plan of Care Expiration: 12/9/2022  Progress Note Due: 10/16/2022  Visit # / Visits authorized: 6/11   FOTO: 1/3     Precautions: Standard, Diabetes, and cancer     PTA Visit #: 1/5     Time In: 0729  Time Out: 0815   Total Billable Time: 46  minutes    SUBJECTIVE     Pt reports: She denies any pain upon arrival but states she continues to have tightness in her UT.     She was compliant with home exercise program.  Response to previous treatment: improvement in ADL's with less pain   Functional change: non-remarkable     Pain: 0/10  Location: left neck      OBJECTIVE     Objective Measures updated at progress report unless specified.     Treatment   Erica received the treatments listed below:      therapeutic exercises to develop strength, endurance, ROM, flexibility, and posture for 25 minutes including:    Seated thoracic extension x 20 repetitions   Open books x 20 repetitions bilaterally   Touch downs level 2,  [id number] with more focus on Left upper trap  Shoulder shrugs 4 pounds 2 x 15 repetitions     KT applied to left Rhomboid/mid trap and lower trap 1 I strip for facilitation 30% tension applied by Ananya Tam PTA    Not today:     Land mines x 20 repetitions with left upper extremity     manual therapy techniques: Joint mobilizations and Soft tissue Mobilization were applied to the: cervical spine for 0 minutes, including:    CT gapping mobilizations grade III   Thoracic PA mobilizations grade III   O-A  flexion mobs   Sub occipital release    neuromuscular re-education activities to improve: Coordination, Kinesthetic, Sense, Proprioception, and Posture for 21 minutes. The following activities were included:    Prone lower trap level 1 3 seconds holds x 20 repetitions   Prone mid trap level 1 3 seconds holds x 20 repetitions   Supine chin tuck x20 with 5s hold   Chin tucks with towel under CT junction 5 seconds holds x 20 repetitions   Supine chin tuck with head lift 2x10 with 5s hold       Patient Education and Home Exercises     Home Exercises Provided and Patient Education Provided     Education provided:   - Home Exercise Program     Written Home Exercises Provided: yes. Exercises were reviewed and Erica was able to demonstrate them prior to the end of the session.  Erica demonstrated good  understanding of the education provided. See EMR under Patient Instructions for exercises provided during therapy sessions    ASSESSMENT     Patient able to perform UT shrugs with increase resistance today although there is an observable delay in the muscle contraction compared to the R still. Her sessions continue to focus on UT activation, thoracic mobility, and DNF training. Pt with good tolerance to therapy session with no adverse effects reported.        Erica Is progressing well towards her goals.   Pt prognosis is Fair.     Pt will continue to benefit from skilled outpatient physical therapy to address the deficits listed in the problem list box on initial evaluation, provide pt/family education and to maximize pt's level of independence in the home and community environment.     Pt's spiritual, cultural and educational needs considered and pt agreeable to plan of care and goals.     Anticipated barriers to physical therapy:  co morbidities, history of multiple rounds of therapy for same diagnosis     Goals:     STG  Weeks/Visits Date Established  Goal Status    1.Patient will increase cervical AROM by 10 deg in all  directions to improve functional mobility  4 weeks/ 8 visits  9/16/2022    In progress 10/7/2022    2. Patient will report </= 5/10 pain in the morning for at worst pain to improve activity tolerance  4 weeks/ 8 visits  9/16/2022     In progress 10/7/2022   3.Patient will report >/= 35% improvement in tightness and function since evaluation to improve quality of life  4 weeks/ 8 visits  9/16/2022     In progress 10/7/2022   4. Pt will demonstrate and verbalize compliance and independence with HEP to improve therapy outcomes  4 weeks/ 8 visits  9/16/2022     In progress 10/7/2022   5.Patient will report </= 40% limitation on FOTO to improve activity participation  4 weeks/ 8 visits  9/16/2022     In progress 10/7/2022      LTG Weeks/Visits Date Established  Goal Status    1.Patient will report </= 3/10 pain in the morning for at worst pain to improve activity tolerance  8 weeks/ 12 visits  9/16/2022        2. Patient will report >/= 50% improvement in tightness and function since evaluation to improve quality of life  8 weeks/ 12 visits  9/16/2022           3.Patient will report </= 32% limitation on FOTO to improve activity participation  8 weeks/ 12 visits  9/16/2022        4. Patient will increase BUE strength to >/= 4/5 to improve ability to lift objects overhead  8 weeks/ 12 visits  9/16/2022        5. Patient will report a decrease in tenderness to palpation of cervical musculature since evaluation to improve ability to carry out household chores  8 weeks/ 12 visits  9/16/2022            PLAN     Progress periscapular and cervical strengthening as able.     Jimmy Esqueda, PTA

## 2022-10-10 ENCOUNTER — CLINICAL SUPPORT (OUTPATIENT)
Dept: REHABILITATION | Facility: HOSPITAL | Age: 77
End: 2022-10-10
Attending: PHYSICAL MEDICINE & REHABILITATION
Payer: MEDICARE

## 2022-10-10 DIAGNOSIS — R29.3 POOR POSTURE: Primary | ICD-10-CM

## 2022-10-10 DIAGNOSIS — R29.898 DECREASED STRENGTH OF UPPER EXTREMITY: ICD-10-CM

## 2022-10-10 PROCEDURE — 97110 THERAPEUTIC EXERCISES: CPT | Mod: KX,PN,CQ

## 2022-10-10 PROCEDURE — 97140 MANUAL THERAPY 1/> REGIONS: CPT | Mod: KX,PN,CQ

## 2022-10-10 PROCEDURE — 97112 NEUROMUSCULAR REEDUCATION: CPT | Mod: KX,PN,CQ

## 2022-10-10 NOTE — PROGRESS NOTES
"[facility]   Physical Therapy Treatment Note     Name: Erica Masterson  Clinic Number: [id number]    Therapy Diagnosis:   Encounter Diagnoses   Name Primary?    Poor posture Yes    Decreased strength of upper extremity      Physician: Eunice Alvarez,*    Visit Date: 10/10/2022    Physician Orders: PT Eval and Treat  Medical Diagnosis from Referral: G24.3 (ICD-10-CM) - Cervical dystonia  Evaluation Date: 9/16/2022  Authorization Period Expiration: 10/14/2022  Plan of Care Expiration: 12/9/2022  Progress Note Due: 10/16/2022  Visit # / Visits authorized: 7/11   FOTO: 1/3     Precautions: Standard, Diabetes, and cancer     PTA Visit #: 2/5     Time In: 0729  Time Out: 0830   Total Billable Time: 56  minutes    SUBJECTIVE     Pt reports: "This is the first morning since my surgery on 9/13/21 that I woke up and felt comfortable." She denies pain this morning and stated, "Not as much tightness as I would normally have."      She was compliant with home exercise program.  Response to previous treatment: improvement in ADL's with less pain   Functional change: non-remarkable     Pain: 0/10  Location: left neck      OBJECTIVE     Objective Measures updated at progress report unless specified.     Treatment   Erica received the treatments listed below:      therapeutic exercises to develop strength, endurance, ROM, flexibility, and posture for 25 minutes including:    Seated thoracic extension x 20 repetitions   Open books x 20 repetitions bilaterally   Touch downs level 2,  [id number] with more focus on Left upper trap  Shoulder shrugs 5 pounds 2 x 15 repetitions         Not today:     Land mines x 20 repetitions with left upper extremity   KT applied to left Rhomboid/mid trap and lower trap 1 I strip for facilitation 30% tension applied by Ananya Tam PTA      manual therapy techniques: Joint mobilizations and Soft tissue Mobilization were applied to the: cervical spine for 10 " minutes, including:    Supine Thoracic PA mobilizations grade III   Sub occipital release    Not performed     O-A flexion mobs   CT gapping mobilizations grade III   neuromuscular re-education activities to improve: Coordination, Kinesthetic, Sense, Proprioception, and Posture for 21 minutes. The following activities were included:    Prone lower trap level 1 3 seconds holds x 20 repetitions   Prone mid trap level 1 3 seconds holds x 20 repetitions   Supine chin tuck x20 with 5s hold with towel under occiput  Chin tucks with towel under CT junction 5 seconds holds x 20 repetitions   Supine chin tuck with head lift 2x10 with 5s hold       Patient Education and Home Exercises     Home Exercises Provided and Patient Education Provided     Education provided:   - Home Exercise Program     Written Home Exercises Provided: yes. Exercises were reviewed and Erica was able to demonstrate them prior to the end of the session.  Erica demonstrated good  understanding of the education provided. See EMR under Patient Instructions for exercises provided during therapy sessions    ASSESSMENT     She requires cueing to improve prone exercises as she was performing excessive movements with her chest. She responds well to cueing with improvement noted. Patient is making excellent progress towards her goals evident by subjective reporting. She will continue to benefit from skilled Physical Therapy to improve functional mobility without pain or limitations.       Erica Is progressing well towards her goals.   Pt prognosis is Fair.     Pt will continue to benefit from skilled outpatient physical therapy to address the deficits listed in the problem list box on initial evaluation, provide pt/family education and to maximize pt's level of independence in the home and community environment.     Pt's spiritual, cultural and educational needs considered and pt agreeable to plan of care and goals.     Anticipated barriers to physical therapy:   co morbidities, history of multiple rounds of therapy for same diagnosis     Goals:     STG  Weeks/Visits Date Established  Goal Status    1.Patient will increase cervical AROM by 10 deg in all directions to improve functional mobility  4 weeks/ 8 visits  9/16/2022    In progress 10/10/2022    2. Patient will report </= 5/10 pain in the morning for at worst pain to improve activity tolerance  4 weeks/ 8 visits  9/16/2022     In progress 10/10/2022   3.Patient will report >/= 35% improvement in tightness and function since evaluation to improve quality of life  4 weeks/ 8 visits  9/16/2022     In progress 10/10/2022   4. Pt will demonstrate and verbalize compliance and independence with HEP to improve therapy outcomes  4 weeks/ 8 visits  9/16/2022     In progress 10/10/2022   5.Patient will report </= 40% limitation on FOTO to improve activity participation  4 weeks/ 8 visits  9/16/2022     In progress 10/10/2022      LTG Weeks/Visits Date Established  Goal Status    1.Patient will report </= 3/10 pain in the morning for at worst pain to improve activity tolerance  8 weeks/ 12 visits  9/16/2022        2. Patient will report >/= 50% improvement in tightness and function since evaluation to improve quality of life  8 weeks/ 12 visits  9/16/2022           3.Patient will report </= 32% limitation on FOTO to improve activity participation  8 weeks/ 12 visits  9/16/2022        4. Patient will increase BUE strength to >/= 4/5 to improve ability to lift objects overhead  8 weeks/ 12 visits  9/16/2022        5. Patient will report a decrease in tenderness to palpation of cervical musculature since evaluation to improve ability to carry out household chores  8 weeks/ 12 visits  9/16/2022            PLAN     Progress periscapular and cervical strengthening as able.     Ananya Tam, PTA

## 2022-10-14 ENCOUNTER — TELEPHONE (OUTPATIENT)
Dept: OPHTHALMOLOGY | Facility: CLINIC | Age: 77
End: 2022-10-14
Payer: MEDICARE

## 2022-10-14 ENCOUNTER — TELEPHONE (OUTPATIENT)
Dept: OTOLARYNGOLOGY | Facility: CLINIC | Age: 77
End: 2022-10-14
Payer: MEDICARE

## 2022-10-14 ENCOUNTER — CLINICAL SUPPORT (OUTPATIENT)
Dept: REHABILITATION | Facility: HOSPITAL | Age: 77
End: 2022-10-14
Attending: PHYSICAL MEDICINE & REHABILITATION
Payer: MEDICARE

## 2022-10-14 DIAGNOSIS — R29.898 DECREASED STRENGTH OF UPPER EXTREMITY: ICD-10-CM

## 2022-10-14 DIAGNOSIS — R29.3 POOR POSTURE: Primary | ICD-10-CM

## 2022-10-14 PROCEDURE — 97140 MANUAL THERAPY 1/> REGIONS: CPT | Mod: KX,PN

## 2022-10-14 PROCEDURE — 97110 THERAPEUTIC EXERCISES: CPT | Mod: KX,PN

## 2022-10-14 PROCEDURE — 97112 NEUROMUSCULAR REEDUCATION: CPT | Mod: KX,PN

## 2022-10-14 NOTE — PROGRESS NOTES
[facility]   Physical Therapy Treatment Note     Name: Erica Masterson  Clinic Number: [id number]    Therapy Diagnosis:   Encounter Diagnoses   Name Primary?    Poor posture Yes    Decreased strength of upper extremity      Physician: Eunice Alvarez,*    Visit Date: 10/14/2022    Physician Orders: PT Eval and Treat  Medical Diagnosis from Referral: G24.3 (ICD-10-CM) - Cervical dystonia  Evaluation Date: 9/16/2022  Authorization Period Expiration: 10/14/2022  Plan of Care Expiration: 12/9/2022  Progress Note Due: 10/16/2022  Visit # / Visits authorized: 8/11   FOTO: 1/3     Precautions: Standard, Diabetes, and cancer     PTA Visit #: 0/5     Time In: 0700  Time Out: 0758  Total Billable Time: 25  minutes    SUBJECTIVE     Pt reports: she is feeling much better, her neck is improving      She was compliant with home exercise program.  Response to previous treatment: improvement in ADL's with less pain   Functional change: non-remarkable     Pain: 0/10  Location: left neck      OBJECTIVE     Objective Measures updated at progress report unless specified.     Treatment   Erica received the treatments listed below:        therapeutic exercises to develop strength, endurance, ROM, flexibility, and posture for 25 minutes including:    Seated thoracic extension x 20 repetitions   Open books x 20 repetitions bilaterally   Touch downs level 2,  [id number] with more focus on Left upper trap  Land mines 3x12 repetitions with left upper extremity       Not today:   Shoulder shrugs 5 pounds 2 x 15 repetitions   KT applied to left Rhomboid/mid trap and lower trap 1 I strip for facilitation 30% tension applied by Ananya Tam PTA      manual therapy techniques: Joint mobilizations and Soft tissue Mobilization were applied to the: cervical spine for 10 minutes, including:    CT gapping mobilizations grade III   Thoracic PA mobilizations grade III       Not performed   O-A flexion mobs    Sub occipital release    neuromuscular re-education activities to improve: Coordination, Kinesthetic, Sense, Proprioception, and Posture for 20 minutes. The following activities were included:    Prone lower trap level II 3 seconds holds x 20 repetitions   Prone mid trap level II 3 seconds holds x 20 repetitions   Supine chin tuck x30 with 5s hold with towel under occiput  Supine chin tuck with head lift 2x10 with 5s hold     Not today:   Chin tucks with towel under CT junction 5 seconds holds x 20 repetitions         Patient Education and Home Exercises     Home Exercises Provided and Patient Education Provided     Education provided:   - Home Exercise Program     Written Home Exercises Provided: yes. Exercises were reviewed and Erica was able to demonstrate them prior to the end of the session.  Erica demonstrated good  understanding of the education provided. See EMR under Patient Instructions for exercises provided during therapy sessions    ASSESSMENT   Erica continues to present reporting improvement in symptoms. Her ability to activate her trapezius muscles during exercise has improved significantly since beginning PT. Will progress as able.       Erica Is progressing well towards her goals.   Pt prognosis is Fair.     Pt will continue to benefit from skilled outpatient physical therapy to address the deficits listed in the problem list box on initial evaluation, provide pt/family education and to maximize pt's level of independence in the home and community environment.     Pt's spiritual, cultural and educational needs considered and pt agreeable to plan of care and goals.     Anticipated barriers to physical therapy:  co morbidities, history of multiple rounds of therapy for same diagnosis     Goals:     STG  Weeks/Visits Date Established  Goal Status    1.Patient will increase cervical AROM by 10 deg in all directions to improve functional mobility  4 weeks/ 8 visits  9/16/2022    In progress 10/14/2022     2. Patient will report </= 5/10 pain in the morning for at worst pain to improve activity tolerance  4 weeks/ 8 visits  9/16/2022     In progress 10/14/2022   3.Patient will report >/= 35% improvement in tightness and function since evaluation to improve quality of life  4 weeks/ 8 visits  9/16/2022     In progress 10/14/2022   4. Pt will demonstrate and verbalize compliance and independence with HEP to improve therapy outcomes  4 weeks/ 8 visits  9/16/2022     In progress 10/14/2022   5.Patient will report </= 40% limitation on FOTO to improve activity participation  4 weeks/ 8 visits  9/16/2022     In progress 10/14/2022      LTG Weeks/Visits Date Established  Goal Status    1.Patient will report </= 3/10 pain in the morning for at worst pain to improve activity tolerance  8 weeks/ 12 visits  9/16/2022        2. Patient will report >/= 50% improvement in tightness and function since evaluation to improve quality of life  8 weeks/ 12 visits  9/16/2022           3.Patient will report </= 32% limitation on FOTO to improve activity participation  8 weeks/ 12 visits  9/16/2022        4. Patient will increase BUE strength to >/= 4/5 to improve ability to lift objects overhead  8 weeks/ 12 visits  9/16/2022        5. Patient will report a decrease in tenderness to palpation of cervical musculature since evaluation to improve ability to carry out household chores  8 weeks/ 12 visits  9/16/2022            PLAN     Progress periscapular and cervical strengthening as able.     Noah Sanchez, PT

## 2022-10-14 NOTE — TELEPHONE ENCOUNTER
Lvm for pt to call back to RS appt       -TD     ----- Message from Dulce Jennings sent at 10/14/2022 12:06 PM CDT -----  Regarding: pt call back  Name of Who is Calling: TODD RODRIGUEZ [[id number]]      What is the request in detail: Pt states she will not make the appt today. Please advise        Can the clinic reply by [facility]: NO        What Number to Call Back if not in [facility]: [phone] (home)

## 2022-10-14 NOTE — TELEPHONE ENCOUNTER
----- Message from Dulce Jennings sent at 10/14/2022 12:09 PM CDT -----  Regarding: pt call back  Name of Who is Calling: TODD RODRIGUEZ [[id number]]      What is the request in detail: Pt states she can not make her upcoming appt and would like to reschedule. Please advise         Can the clinic reply by [facility]: NO        What Number to Call Back if not in [facility]:

## 2022-10-17 ENCOUNTER — CLINICAL SUPPORT (OUTPATIENT)
Dept: REHABILITATION | Facility: HOSPITAL | Age: 77
End: 2022-10-17
Payer: MEDICARE

## 2022-10-17 DIAGNOSIS — R29.898 DECREASED STRENGTH OF UPPER EXTREMITY: ICD-10-CM

## 2022-10-17 DIAGNOSIS — R29.3 POOR POSTURE: Primary | ICD-10-CM

## 2022-10-17 PROCEDURE — 97112 NEUROMUSCULAR REEDUCATION: CPT | Mod: KX,PN,CQ

## 2022-10-17 PROCEDURE — 97140 MANUAL THERAPY 1/> REGIONS: CPT | Mod: KX,PN,CQ

## 2022-10-17 PROCEDURE — 97110 THERAPEUTIC EXERCISES: CPT | Mod: KX,PN,CQ

## 2022-10-17 NOTE — PROGRESS NOTES
[facility]   Physical Therapy Treatment Note     Name: Erica Masterson  Clinic Number: [id number]    Therapy Diagnosis:   Encounter Diagnoses   Name Primary?    Poor posture Yes    Decreased strength of upper extremity      Physician: Eunice Alvarez,*    Visit Date: 10/17/2022    Physician Orders: PT Eval and Treat  Medical Diagnosis from Referral: G24.3 (ICD-10-CM) - Cervical dystonia  Evaluation Date: 9/16/2022  Authorization Period Expiration: 10/14/2022  Plan of Care Expiration: 12/9/2022  Progress Note Due: 10/16/2022  Visit # / Visits authorized: 8/11   FOTO: 1/3     Precautions: Standard, Diabetes, and cancer     PTA Visit #: 1/5     Time In: 0900  Time Out: 1000  Total Billable Time: 54 minutes    SUBJECTIVE     Pt reports: she is sleeping much better as her neck no longer wakes her up in her sleep. Reports minor tightness in the am but much improved after her therapy sessions.     She was compliant with home exercise program.  Response to previous treatment: improvement in ADL's with less pain   Functional change: non-remarkable     Pain: 0/10  Location: left neck      OBJECTIVE     Objective Measures updated at progress report unless specified.     Treatment   Erica received the treatments listed below:        therapeutic exercises to develop strength, endurance, ROM, flexibility, and posture for 25 minutes including:    Seated thoracic extension x 20 repetitions   Open books x 20 repetitions bilaterally   Touch downs level 2,  [id number] with more focus on Left upper trap  Land mines 3x12 repetitions with left upper extremity       Not today:   Shoulder shrugs 5 pounds 2 x 15 repetitions   KT applied to left Rhomboid/mid trap and lower trap 1 I strip for facilitation 30% tension applied by Ananya Tam PTA      manual therapy techniques: Joint mobilizations and Soft tissue Mobilization were applied to the: cervical spine for 10 minutes, including:    CT gapping  mobilizations grade III   Sub occipital release    Not performed   O-A flexion mobs   Thoracic PA mobilizations grade III     neuromuscular re-education activities to improve: Coordination, Kinesthetic, Sense, Proprioception, and Posture for 20 minutes. The following activities were included:    Prone lower trap level II 3 seconds holds x 20 repetitions   Prone mid trap level II 3 seconds holds x 20 repetitions   Supine chin tuck x30 with 5s hold with towel under occiput  Supine chin tuck with head lift 2x10 with 5s hold     Not today:   Chin tucks with towel under CT junction 5 seconds holds x 20 repetitions         Patient Education and Home Exercises     Home Exercises Provided and Patient Education Provided     Education provided:   - Home Exercise Program     Written Home Exercises Provided: yes. Exercises were reviewed and Erica was able to demonstrate them prior to the end of the session.  Erica demonstrated good  understanding of the education provided. See EMR under Patient Instructions for exercises provided during therapy sessions    ASSESSMENT     As per subjective reports, patient is making excellent progress towards her goals. She requires significant cueing to improve deep neck flexors and responds well with improvement noted but difficulty sustaining. Patient will continue to benefit from skilled Physical Therapy to improve remaining strength deficits to allow patient to return to prior level of function without pain or limitations.     Erica Is progressing well towards her goals.   Pt prognosis is Fair.     Pt will continue to benefit from skilled outpatient physical therapy to address the deficits listed in the problem list box on initial evaluation, provide pt/family education and to maximize pt's level of independence in the home and community environment.     Pt's spiritual, cultural and educational needs considered and pt agreeable to plan of care and goals.     Anticipated barriers to physical  therapy:  co morbidities, history of multiple rounds of therapy for same diagnosis     Goals:     STG  Weeks/Visits Date Established  Goal Status    1.Patient will increase cervical AROM by 10 deg in all directions to improve functional mobility  4 weeks/ 8 visits  9/16/2022    In progress 10/17/2022    2. Patient will report </= 5/10 pain in the morning for at worst pain to improve activity tolerance  4 weeks/ 8 visits  9/16/2022     In progress 10/17/2022   3.Patient will report >/= 35% improvement in tightness and function since evaluation to improve quality of life  4 weeks/ 8 visits  9/16/2022     In progress 10/17/2022   4. Pt will demonstrate and verbalize compliance and independence with HEP to improve therapy outcomes  4 weeks/ 8 visits  9/16/2022     In progress 10/17/2022   5.Patient will report </= 40% limitation on FOTO to improve activity participation  4 weeks/ 8 visits  9/16/2022     In progress 10/17/2022      LTG Weeks/Visits Date Established  Goal Status    1.Patient will report </= 3/10 pain in the morning for at worst pain to improve activity tolerance  8 weeks/ 12 visits  9/16/2022        2. Patient will report >/= 50% improvement in tightness and function since evaluation to improve quality of life  8 weeks/ 12 visits  9/16/2022           3.Patient will report </= 32% limitation on FOTO to improve activity participation  8 weeks/ 12 visits  9/16/2022        4. Patient will increase BUE strength to >/= 4/5 to improve ability to lift objects overhead  8 weeks/ 12 visits  9/16/2022        5. Patient will report a decrease in tenderness to palpation of cervical musculature since evaluation to improve ability to carry out household chores  8 weeks/ 12 visits  9/16/2022            PLAN     Progress periscapular and cervical strengthening as able.     Ananya Tam, PTA

## 2022-10-20 ENCOUNTER — OFFICE VISIT (OUTPATIENT)
Dept: ENDOCRINOLOGY | Facility: CLINIC | Age: 77
End: 2022-10-20
Payer: MEDICARE

## 2022-10-20 VITALS
HEART RATE: 100 BPM | SYSTOLIC BLOOD PRESSURE: 130 MMHG | WEIGHT: 154 LBS | DIASTOLIC BLOOD PRESSURE: 80 MMHG | OXYGEN SATURATION: 97 % | HEIGHT: 66 IN | BODY MASS INDEX: 24.75 KG/M2 | TEMPERATURE: 98 F

## 2022-10-20 DIAGNOSIS — E89.0 POSTOPERATIVE HYPOTHYROIDISM: Chronic | ICD-10-CM

## 2022-10-20 DIAGNOSIS — E89.0 POST-SURGICAL HYPOTHYROIDISM: Primary | ICD-10-CM

## 2022-10-20 DIAGNOSIS — E83.51 HYPOCALCEMIA: ICD-10-CM

## 2022-10-20 DIAGNOSIS — E04.1 THYROID NODULE: ICD-10-CM

## 2022-10-20 DIAGNOSIS — C73 THYROID CANCER: Chronic | ICD-10-CM

## 2022-10-20 DIAGNOSIS — E55.9 VITAMIN D INSUFFICIENCY: ICD-10-CM

## 2022-10-20 DIAGNOSIS — M85.88 OSTEOPENIA OF LUMBAR SPINE: ICD-10-CM

## 2022-10-20 DIAGNOSIS — C73 MALIGNANT NEOPLASM OF THYROID GLAND: ICD-10-CM

## 2022-10-20 PROCEDURE — 1101F PR PT FALLS ASSESS DOC 0-1 FALLS W/OUT INJ PAST YR: ICD-10-PCS | Mod: CPTII,S$GLB,, | Performed by: INTERNAL MEDICINE

## 2022-10-20 PROCEDURE — 1101F PT FALLS ASSESS-DOCD LE1/YR: CPT | Mod: CPTII,S$GLB,, | Performed by: INTERNAL MEDICINE

## 2022-10-20 PROCEDURE — 1160F PR REVIEW ALL MEDS BY PRESCRIBER/CLIN PHARMACIST DOCUMENTED: ICD-10-PCS | Mod: CPTII,S$GLB,, | Performed by: INTERNAL MEDICINE

## 2022-10-20 PROCEDURE — 99214 PR OFFICE/OUTPT VISIT, EST, LEVL IV, 30-39 MIN: ICD-10-PCS | Mod: S$GLB,,, | Performed by: INTERNAL MEDICINE

## 2022-10-20 PROCEDURE — 99499 UNLISTED E&M SERVICE: CPT | Mod: S$GLB,,, | Performed by: INTERNAL MEDICINE

## 2022-10-20 PROCEDURE — 3079F PR MOST RECENT DIASTOLIC BLOOD PRESSURE 80-89 MM HG: ICD-10-PCS | Mod: CPTII,S$GLB,, | Performed by: INTERNAL MEDICINE

## 2022-10-20 PROCEDURE — 99999 PR PBB SHADOW E&M-EST. PATIENT-LVL IV: CPT | Mod: PBBFAC,,, | Performed by: INTERNAL MEDICINE

## 2022-10-20 PROCEDURE — 99999 PR PBB SHADOW E&M-EST. PATIENT-LVL IV: ICD-10-PCS | Mod: PBBFAC,,, | Performed by: INTERNAL MEDICINE

## 2022-10-20 PROCEDURE — 1160F RVW MEDS BY RX/DR IN RCRD: CPT | Mod: CPTII,S$GLB,, | Performed by: INTERNAL MEDICINE

## 2022-10-20 PROCEDURE — 99214 OFFICE O/P EST MOD 30 MIN: CPT | Mod: S$GLB,,, | Performed by: INTERNAL MEDICINE

## 2022-10-20 PROCEDURE — 1126F PR PAIN SEVERITY QUANTIFIED, NO PAIN PRESENT: ICD-10-PCS | Mod: CPTII,S$GLB,, | Performed by: INTERNAL MEDICINE

## 2022-10-20 PROCEDURE — 1159F MED LIST DOCD IN RCRD: CPT | Mod: CPTII,S$GLB,, | Performed by: INTERNAL MEDICINE

## 2022-10-20 PROCEDURE — 3288F FALL RISK ASSESSMENT DOCD: CPT | Mod: CPTII,S$GLB,, | Performed by: INTERNAL MEDICINE

## 2022-10-20 PROCEDURE — 3075F PR MOST RECENT SYSTOLIC BLOOD PRESS GE 130-139MM HG: ICD-10-PCS | Mod: CPTII,S$GLB,, | Performed by: INTERNAL MEDICINE

## 2022-10-20 PROCEDURE — 1159F PR MEDICATION LIST DOCUMENTED IN MEDICAL RECORD: ICD-10-PCS | Mod: CPTII,S$GLB,, | Performed by: INTERNAL MEDICINE

## 2022-10-20 PROCEDURE — 3288F PR FALLS RISK ASSESSMENT DOCUMENTED: ICD-10-PCS | Mod: CPTII,S$GLB,, | Performed by: INTERNAL MEDICINE

## 2022-10-20 PROCEDURE — 1126F AMNT PAIN NOTED NONE PRSNT: CPT | Mod: CPTII,S$GLB,, | Performed by: INTERNAL MEDICINE

## 2022-10-20 PROCEDURE — 3075F SYST BP GE 130 - 139MM HG: CPT | Mod: CPTII,S$GLB,, | Performed by: INTERNAL MEDICINE

## 2022-10-20 PROCEDURE — 3079F DIAST BP 80-89 MM HG: CPT | Mod: CPTII,S$GLB,, | Performed by: INTERNAL MEDICINE

## 2022-10-20 RX ORDER — LEVOTHYROXINE SODIUM 75 UG/1
75 TABLET ORAL
Qty: 90 TABLET | Refills: 3 | Status: SHIPPED | OUTPATIENT
Start: 2022-10-20 | End: 2023-05-30 | Stop reason: SDUPTHER

## 2022-10-20 NOTE — ASSESSMENT & PLAN NOTE
s/p surgery 5/2021. Multifocal PTC. 2 cm, +margin, +residual tissue.   Then s/p completion thyroidectomy 9/2021. Pathology with multiple LN + for cancer (5/30)  Post-operative thyroglobulin as high as 91 initially.  S/p radioactive iodine ablation with 168.7 mCi 12/15/2021.     - stimulated TG was 12     - post-treatment WBS negative for distant disease    For now, goal TSH on the low side.    Last thyroglobulin remains elevated, though decreasing from prior. Down to 7s.   still higher than expected given surgery and the amount of iodine given.    Last US without concerning LN on report.    With thyroglobulin decreasing and US without concerning findings, monitor for now. Recheck after 6 months.   if TG increasing then would go for PET scan vs thyrogen stimulated whole body scan.

## 2022-10-20 NOTE — ASSESSMENT & PLAN NOTE
Goal TSH low side of normal   last TSH remains low.     Decrease dose to 75 mcg/day   recheck level in a few months

## 2022-10-20 NOTE — PROGRESS NOTES
Of note, pt prefers phone call with results. Doesn't check portal all the time.    Subjective:      Chief Complaint: Thyroid Cancer and Osteopenia    HPI: Erica Masterson is a 77 y.o. female who is here for a follow-up evaluation for thyroid. Last seen 3/15/2022    For her thyroid cancer:   Nodules were found several years ago, since at least 2019. Established care in endocrine here 3/2021 after repeat US still showed MNG. FNA recommended, completed 4/29/2021 and showed PTC.    Treated with total thyroidectomy on 5/2021.   Path: 2 cm, positive margin on the left. Also multifocal (4 foci, 2-5 mm)   right lobe: benign   no lymph nodes assessed   No angioinvasion, no no lymphatic invasion  Some tumor remained in situ, densely adherent/invading the trachea and RLN.    Post-op thyroglobulin: high, 91.   Presented at tumor board.  consensus was try for repeat surgery then radioactive iodine. Surgery team recommended CT to evaluate for LN.  CT done, found several abnormal lymph nodes, concerning for malignancy.  Saw ENT, had repeat surgery 9/13/2021 5/30 LN + for malignancy.    She was then treated with radioactive iodine 168.7 mCi on 12/15/2021.  Thyrogen-stimulated thyroglobulin level was 12 (TSH 92)  Post-treatment WBS 12/22/2021. Uptake in the neck, no distant disease noted.    Post-operative course has been complicated by neck tightness. Followed with ENT, s/p PT, seeing ortho as well with concerns for spinal accessory nerve injury.    Surveillance:  Last thyroglobulin:  Lab Results   Component Value Date    THYGLBTUM 7.6 (H) 09/30/2022    THGABSCRN <1.8 09/30/2022     Last neck US: 9/2022.   Reactive lymph nodes and scar tissue seen    With regards to her post-surgical hypothyroidism:  Current medication:  generic levothyroxine  Current dose: 88 mcg daily    Pt takes thyroid medication properly, in the early morning on an empty stomach with water. Denies missed doses/running out.    Lab Results   Component Value  Date    TSH 0.049 (L) 09/30/2022    FREET4 1.01 09/30/2022     Had low vitamin D:   Vitamin D: was 11 (4/2021)    Last labs:  Lab Results   Component Value Date    CALCIUM 9.0 09/30/2022    ALBUMIN 3.7 09/30/2022    CREATININE 1.0 09/30/2022    [id number] 40 03/22/2022    PTH 35.0 10/04/2021      Now on 50,000 units/month    Bones: DXA 4/2021. Osteopenia. -2.4 spine    Current symptoms:  No   Yes  [x]    []  Trouble swallowing  [x]    []  Trouble breathing  []    [x]  Voice changes. After surgery. Improving.  [x]    []  Neck swelling    [x]    []  Fatigue. Energy okay, exercise a few days per week.  [x]    []  Constipation/diarrhea.  [x]    []  Heat/Cold intolerance  [x]    []  Weight gain or weight loss  [x]    []  Palpitations or tremor    Physical therapy and other evaluation/treatment for the neck tightness with some improvement.    Hair was falling out, improving lately      Reviewed past medical, family, social history and updated as appropriate.    Review of Systems  As above    Objective:     Vitals:    10/20/22 0925   BP: 130/80   Pulse: 100   Temp: 98.4 °F (36.9 °C)     BP Readings from Last 5 Encounters:   10/20/22 130/80   09/15/22 (!) 102/58   09/14/22 127/63   09/08/22 (!) 142/74   08/25/22 114/65     Physical Exam  Vitals reviewed.   Constitutional:       General: She is not in acute distress.  Neck:      Thyroid: No thyroid mass, thyromegaly or thyroid tenderness.      Comments: Absent thyroid, +scar  Cardiovascular:      Heart sounds: Normal heart sounds.   Pulmonary:      Effort: Pulmonary effort is normal.     Wt Readings from Last 10 Encounters:   10/20/22 0925 69.9 kg (154 lb)   09/21/22 1307 70.3 kg (154 lb 15.7 oz)   09/15/22 1406 68.9 kg (151 lb 14.4 oz)   09/14/22 0902 67.5 kg (148 lb 13 oz)   09/08/22 1542 67.5 kg (148 lb 14.7 oz)   08/25/22 1229 66.7 kg (147 lb)   08/22/22 0855 68 kg (150 lb)   08/18/22 1459 67.8 kg (149 lb 5.8 oz)   08/16/22 0800 73.4 kg (161 lb 13.1 oz)   08/16/22 0321  67.6 kg (149 lb)   08/15/22 0806 66 kg (145 lb 8.1 oz)     Lab Results   Component Value Date    HGBA1C 6.0 (H) 08/08/2022     Lab Results   Component Value Date    CHOL 154 08/08/2022    HDL 59 08/08/2022    LDLCALC 86.4 08/08/2022    TRIG 43 08/08/2022    CHOLHDL 38.3 08/08/2022     Lab Results   Component Value Date     09/30/2022    K 4.5 09/30/2022     09/30/2022    CO2 30 (H) 09/30/2022     09/30/2022    BUN 18 09/30/2022    CREATININE 1.0 09/30/2022    CALCIUM 9.0 09/30/2022    PROT 6.6 09/30/2022    ALBUMIN 3.7 09/30/2022    BILITOT 0.7 09/30/2022    ALKPHOS 46 (L) 09/30/2022    AST 15 09/30/2022    ALT 12 09/30/2022    ANIONGAP 8 09/30/2022    ESTGFRAFRICA 56 (A) 07/19/2022    EGFRNONAA 48 (A) 07/19/2022    TSH 0.049 (L) 09/30/2022        Assessment/Plan:     Thyroid cancer  s/p surgery 5/2021. Multifocal PTC. 2 cm, +margin, +residual tissue.   Then s/p completion thyroidectomy 9/2021. Pathology with multiple LN + for cancer (5/30)  Post-operative thyroglobulin as high as 91 initially.  S/p radioactive iodine ablation with 168.7 mCi 12/15/2021.     - stimulated TG was 12     - post-treatment WBS negative for distant disease    For now, goal TSH on the low side.    Last thyroglobulin remains elevated, though decreasing from prior. Down to 7s.   still higher than expected given surgery and the amount of iodine given.    Last US without concerning LN on report.    With thyroglobulin decreasing and US without concerning findings, monitor for now. Recheck after 6 months.   if TG increasing then would go for PET scan vs thyrogen stimulated whole body scan.      Postoperative hypothyroidism  Goal TSH low side of normal   last TSH remains low.     Decrease dose to 75 mcg/day   recheck level in a few months      Vitamin D insufficiency  Had been down to 11 in 4/2021   improved with supplementation (was on 50,000 units/week for a while with normalization of vitamin D)  Now on 50,000 units once a  month   continue   monitor from time to time      Osteopenia of lumbar spine   DXA 4/2021 -2.4 tscore in spine. FRAX not elevated.   continue vitamin D intake, calcium   avoid falls   repeat DXA 2023        Follow-up 6 months with repeat labs before        Juan M Landrum MD  Endocrinology

## 2022-10-20 NOTE — ASSESSMENT & PLAN NOTE
DXA 4/2021 -2.4 tscore in spine. FRAX not elevated.   continue vitamin D intake, calcium   avoid falls   repeat DXA 2023

## 2022-10-21 ENCOUNTER — CLINICAL SUPPORT (OUTPATIENT)
Dept: REHABILITATION | Facility: HOSPITAL | Age: 77
End: 2022-10-21
Payer: MEDICARE

## 2022-10-21 DIAGNOSIS — R29.898 DECREASED STRENGTH OF UPPER EXTREMITY: ICD-10-CM

## 2022-10-21 DIAGNOSIS — R29.3 POOR POSTURE: Primary | ICD-10-CM

## 2022-10-21 PROCEDURE — 97112 NEUROMUSCULAR REEDUCATION: CPT | Mod: PN,CQ

## 2022-10-21 PROCEDURE — 97110 THERAPEUTIC EXERCISES: CPT | Mod: PN,CQ

## 2022-10-21 NOTE — PROGRESS NOTES
[facility]   Physical Therapy Treatment Note     Name: Erica Masterson  Clinic Number: [id number]    Therapy Diagnosis:   Encounter Diagnoses   Name Primary?    Poor posture Yes    Decreased strength of upper extremity      Physician: Eunice Alvarez,*    Visit Date: 10/21/2022    Physician Orders: PT Eval and Treat  Medical Diagnosis from Referral: G24.3 (ICD-10-CM) - Cervical dystonia  Evaluation Date: 9/16/2022  Authorization Period Expiration: 10/14/2022  Plan of Care Expiration: 12/9/2022  Progress Note Due: 10/16/2022  Visit # / Visits authorized: 10/11   FOTO: 1/3     Precautions: Standard, Diabetes, and cancer     PTA Visit #: 1/5     Time In: 0833  Time Out: 0958  Total Billable Time: 55 minutes    SUBJECTIVE     Pt reports: she states she is doing better with no pain reported but muscle tightness at the L UT .     She was compliant with home exercise program.  Response to previous treatment: improvement in ADL's with less pain   Functional change: non-remarkable     Pain: 0/10  Location: left neck      OBJECTIVE     Objective Measures updated at progress report unless specified.     Treatment   Erica received the treatments listed below:        therapeutic exercises to develop strength, endurance, ROM, flexibility, and posture for 25  minutes including:    Seated thoracic extension x 20 repetitions   Open books x 20 repetitions bilaterally   Touch downs level 2,  [id number] with more focus on Left upper trap  Land mines 3x12 repetitions with left upper extremity       Not today:   Shoulder shrugs 5 pounds 2 x 15 repetitions   KT applied to left Rhomboid/mid trap and lower trap 1 I strip for facilitation 30% tension applied by Ananya Tam PTA      manual therapy techniques: Joint mobilizations and Soft tissue Mobilization were applied to the: cervical spine for 0 minutes, including:    CT gapping mobilizations grade III   Sub occipital release    Not performed    O-A flexion mobs   Thoracic PA mobilizations grade III     neuromuscular re-education activities to improve: Coordination, Kinesthetic, Sense, Proprioception, and Posture for 30 minutes. The following activities were included:    Prone lower trap level II 3 seconds holds x 20 repetitions   Prone mid trap level II 3 seconds holds x 20 repetitions   Supine chin tuck x30 with 5s hold with towel under occiput  Supine chin tuck with head lift 2x10 with 5s hold     Not today:   Chin tucks with towel under CT junction 5 seconds holds x 20 repetitions         Patient Education and Home Exercises     Home Exercises Provided and Patient Education Provided     Education provided:   - Home Exercise Program     Written Home Exercises Provided: yes. Exercises were reviewed and Erica was able to demonstrate them prior to the end of the session.  Erica demonstrated good  understanding of the education provided. See EMR under Patient Instructions for exercises provided during therapy sessions    ASSESSMENT     Patient did well with continued DNF strengthening and upper trap strengthening. Pt with good tolerance to therapy session with no adverse effects reported.        Erica Is progressing well towards her goals.   Pt prognosis is Fair.     Pt will continue to benefit from skilled outpatient physical therapy to address the deficits listed in the problem list box on initial evaluation, provide pt/family education and to maximize pt's level of independence in the home and community environment.     Pt's spiritual, cultural and educational needs considered and pt agreeable to plan of care and goals.     Anticipated barriers to physical therapy:  co morbidities, history of multiple rounds of therapy for same diagnosis     Goals:     STG  Weeks/Visits Date Established  Goal Status    1.Patient will increase cervical AROM by 10 deg in all directions to improve functional mobility  4 weeks/ 8 visits  9/16/2022    In progress 10/21/2022     2. Patient will report </= 5/10 pain in the morning for at worst pain to improve activity tolerance  4 weeks/ 8 visits  9/16/2022     In progress 10/21/2022   3.Patient will report >/= 35% improvement in tightness and function since evaluation to improve quality of life  4 weeks/ 8 visits  9/16/2022     In progress 10/21/2022   4. Pt will demonstrate and verbalize compliance and independence with HEP to improve therapy outcomes  4 weeks/ 8 visits  9/16/2022     In progress 10/21/2022   5.Patient will report </= 40% limitation on FOTO to improve activity participation  4 weeks/ 8 visits  9/16/2022     In progress 10/21/2022      LTG Weeks/Visits Date Established  Goal Status    1.Patient will report </= 3/10 pain in the morning for at worst pain to improve activity tolerance  8 weeks/ 12 visits  9/16/2022        2. Patient will report >/= 50% improvement in tightness and function since evaluation to improve quality of life  8 weeks/ 12 visits  9/16/2022           3.Patient will report </= 32% limitation on FOTO to improve activity participation  8 weeks/ 12 visits  9/16/2022        4. Patient will increase BUE strength to >/= 4/5 to improve ability to lift objects overhead  8 weeks/ 12 visits  9/16/2022        5. Patient will report a decrease in tenderness to palpation of cervical musculature since evaluation to improve ability to carry out household chores  8 weeks/ 12 visits  9/16/2022            PLAN     Progress periscapular and cervical strengthening as able.     Jimmy Esqueda, PTA

## 2022-10-26 ENCOUNTER — OFFICE VISIT (OUTPATIENT)
Dept: PHYSICAL MEDICINE AND REHAB | Facility: CLINIC | Age: 77
End: 2022-10-26
Payer: MEDICARE

## 2022-10-26 VITALS
WEIGHT: 154 LBS | HEART RATE: 102 BPM | SYSTOLIC BLOOD PRESSURE: 136 MMHG | BODY MASS INDEX: 26.29 KG/M2 | HEIGHT: 64 IN | DIASTOLIC BLOOD PRESSURE: 80 MMHG

## 2022-10-26 DIAGNOSIS — G24.3 CERVICAL DYSTONIA: Primary | ICD-10-CM

## 2022-10-26 PROCEDURE — 1101F PR PT FALLS ASSESS DOC 0-1 FALLS W/OUT INJ PAST YR: ICD-10-PCS | Mod: CPTII,S$GLB,, | Performed by: PHYSICAL MEDICINE & REHABILITATION

## 2022-10-26 PROCEDURE — 99499 UNLISTED E&M SERVICE: CPT | Mod: S$GLB,,, | Performed by: PHYSICAL MEDICINE & REHABILITATION

## 2022-10-26 PROCEDURE — 64616 CHEMODENERV MUSC NECK DYSTON: CPT | Mod: 50,S$GLB,, | Performed by: PHYSICAL MEDICINE & REHABILITATION

## 2022-10-26 PROCEDURE — 3075F SYST BP GE 130 - 139MM HG: CPT | Mod: CPTII,S$GLB,, | Performed by: PHYSICAL MEDICINE & REHABILITATION

## 2022-10-26 PROCEDURE — 1126F PR PAIN SEVERITY QUANTIFIED, NO PAIN PRESENT: ICD-10-PCS | Mod: CPTII,S$GLB,, | Performed by: PHYSICAL MEDICINE & REHABILITATION

## 2022-10-26 PROCEDURE — 1159F PR MEDICATION LIST DOCUMENTED IN MEDICAL RECORD: ICD-10-PCS | Mod: CPTII,S$GLB,, | Performed by: PHYSICAL MEDICINE & REHABILITATION

## 2022-10-26 PROCEDURE — 3288F PR FALLS RISK ASSESSMENT DOCUMENTED: ICD-10-PCS | Mod: CPTII,S$GLB,, | Performed by: PHYSICAL MEDICINE & REHABILITATION

## 2022-10-26 PROCEDURE — 1159F MED LIST DOCD IN RCRD: CPT | Mod: CPTII,S$GLB,, | Performed by: PHYSICAL MEDICINE & REHABILITATION

## 2022-10-26 PROCEDURE — 95874 PR NEEDLE EMG GUIDANCE FOR CHEMODENERVATION: ICD-10-PCS | Mod: S$GLB,,, | Performed by: PHYSICAL MEDICINE & REHABILITATION

## 2022-10-26 PROCEDURE — 1101F PT FALLS ASSESS-DOCD LE1/YR: CPT | Mod: CPTII,S$GLB,, | Performed by: PHYSICAL MEDICINE & REHABILITATION

## 2022-10-26 PROCEDURE — 3075F PR MOST RECENT SYSTOLIC BLOOD PRESS GE 130-139MM HG: ICD-10-PCS | Mod: CPTII,S$GLB,, | Performed by: PHYSICAL MEDICINE & REHABILITATION

## 2022-10-26 PROCEDURE — 99999 PR PBB SHADOW E&M-EST. PATIENT-LVL III: ICD-10-PCS | Mod: PBBFAC,,, | Performed by: PHYSICAL MEDICINE & REHABILITATION

## 2022-10-26 PROCEDURE — 99499 NO LOS: ICD-10-PCS | Mod: S$GLB,,, | Performed by: PHYSICAL MEDICINE & REHABILITATION

## 2022-10-26 PROCEDURE — 3079F DIAST BP 80-89 MM HG: CPT | Mod: CPTII,S$GLB,, | Performed by: PHYSICAL MEDICINE & REHABILITATION

## 2022-10-26 PROCEDURE — 3288F FALL RISK ASSESSMENT DOCD: CPT | Mod: CPTII,S$GLB,, | Performed by: PHYSICAL MEDICINE & REHABILITATION

## 2022-10-26 PROCEDURE — 99999 PR PBB SHADOW E&M-EST. PATIENT-LVL III: CPT | Mod: PBBFAC,,, | Performed by: PHYSICAL MEDICINE & REHABILITATION

## 2022-10-26 PROCEDURE — 1126F AMNT PAIN NOTED NONE PRSNT: CPT | Mod: CPTII,S$GLB,, | Performed by: PHYSICAL MEDICINE & REHABILITATION

## 2022-10-26 PROCEDURE — 95874 GUIDE NERV DESTR NEEDLE EMG: CPT | Mod: S$GLB,,, | Performed by: PHYSICAL MEDICINE & REHABILITATION

## 2022-10-26 PROCEDURE — 64616 PR CHEMODENERVATION NECK MUSCLES EXC LARNYNX, UNI: ICD-10-PCS | Mod: 50,S$GLB,, | Performed by: PHYSICAL MEDICINE & REHABILITATION

## 2022-10-26 PROCEDURE — 3079F PR MOST RECENT DIASTOLIC BLOOD PRESSURE 80-89 MM HG: ICD-10-PCS | Mod: CPTII,S$GLB,, | Performed by: PHYSICAL MEDICINE & REHABILITATION

## 2022-10-26 NOTE — PROGRESS NOTES
Patient with cervical dystonia who presents to clinic today for Botulinum toxin type-A injections.   The following muscle groups were injected :       Muscle(s) Units of Botulinum Toxin A Injected   Trapezius right 100 u  Lev scap right 30  Trapezius left 100 u  Lev scap left 30  Splenius cap. Right 20u  Splenius cap left 20 u    Total: 300 units  Units Used: 300 Units   Units Wasted: 0 Units     Needle EMG guidance utilized. Risk and benefits reviewed, no complications

## 2022-10-28 ENCOUNTER — CLINICAL SUPPORT (OUTPATIENT)
Dept: REHABILITATION | Facility: HOSPITAL | Age: 77
End: 2022-10-28
Payer: MEDICARE

## 2022-10-28 DIAGNOSIS — R29.898 DECREASED STRENGTH OF UPPER EXTREMITY: ICD-10-CM

## 2022-10-28 DIAGNOSIS — R29.3 POOR POSTURE: Primary | ICD-10-CM

## 2022-10-28 PROCEDURE — 97110 THERAPEUTIC EXERCISES: CPT | Mod: KX,PN

## 2022-10-28 NOTE — PLAN OF CARE
[facility]   Physical Therapy Treatment Note/Discharge Summary     Name: Erica Masterson  Clinic Number: [id number]    Therapy Diagnosis:   No diagnosis found.    Physician: Eunice Alvarez,*    Visit Date: 10/28/2022    Physician Orders: PT Eval and Treat  Medical Diagnosis from Referral: G24.3 (ICD-10-CM) - Cervical dystonia  Evaluation Date: 9/16/2022  Authorization Period Expiration: 10/14/2022  Plan of Care Expiration: 12/9/2022  Progress Note Due: 10/16/2022  Visit # / Visits authorized: 11/11   FOTO: 1/3     Precautions: Standard, Diabetes, and cancer     PTA Visit #: 0/5     Time In: 0800  Time Out: 0845  Total Billable Time: 35 minutes    SUBJECTIVE     Pt reports: she is feeling so much better, she is able to sleep now. States today will be her last day of therapy.     She was compliant with home exercise program.  Response to previous treatment: improvement in ADL's with less pain   Functional change: non-remarkable     Pain: 0/10  Location: left neck      OBJECTIVE     CMS Impairment/Limitation/Restriction for FOTO Neck Survey  Status Limitation G-Code CMS Severity Modifier  Intake 53% 47%  Predicted 59% 41% Goal Status+ CK - At least 40 percent but less than 60 percent  10/28/2022 66% 34% Current Status CJ - At least 20 percent but less than 40 percent    Treatment   Erica received the treatments listed below:      PT technician assisted with treatment under direct supervision of PT for 10 minutes      therapeutic exercises to develop strength, endurance, ROM, flexibility, and posture for 25  minutes including:    Seated thoracic extension x 20 repetitions   Open books x 20 repetitions bilaterally   Touch downs level 2,  [id number] with more focus on Left upper trap  Land mines 3x12 repetitions with left upper extremity       Not today:   Shoulder shrugs 5 pounds 2 x 15 repetitions   KT applied to left Rhomboid/mid trap and lower trap 1 I strip for facilitation 30%  tension applied by Ananya Tam PTA      manual therapy techniques: Joint mobilizations and Soft tissue Mobilization were applied to the: cervical spine for 8 minutes, including:    CT gapping mobilizations grade III   Thoracic PA mobilizations grade III     Not performed   O-A flexion mobs       neuromuscular re-education activities to improve: Coordination, Kinesthetic, Sense, Proprioception, and Posture for 12 minutes. The following activities were included:    Prone lower trap level II 3 seconds holds x 20 repetitions   Prone mid trap level II 3 seconds holds x 20 repetitions   Supine chin tuck x20 with 5s hold with towel under occiput        Patient Education and Home Exercises     Home Exercises Provided and Patient Education Provided     Education provided:   - Home Exercise Program     Written Home Exercises Provided: yes. Exercises were reviewed and Erica was able to demonstrate them prior to the end of the session.  Erica demonstrated good  understanding of the education provided. See EMR under Patient Instructions for exercises provided during therapy sessions    ASSESSMENT     Erica has been seen for a total of 12 visits and demonstrates significant improvement in symptoms and strength of scapular stabilizers. She reports ability to sleep and improved quality of life. She has met all goals and is appropriate for discharge from skilled outpatient PT at this time.      Erica Is progressing well towards her goals.   Pt prognosis is Fair.     Pt will continue to benefit from skilled outpatient physical therapy to address the deficits listed in the problem list box on initial evaluation, provide pt/family education and to maximize pt's level of independence in the home and community environment.     Pt's spiritual, cultural and educational needs considered and pt agreeable to plan of care and goals.     Anticipated barriers to physical therapy:  co morbidities, history of multiple rounds of therapy for  same diagnosis     Goals:     STG  Weeks/Visits Date Established  Goal Status    1.Patient will increase cervical AROM by 10 deg in all directions to improve functional mobility  4 weeks/ 8 visits  9/16/2022    MET10/28/2022    2. Patient will report </= 5/10 pain in the morning for at worst pain to improve activity tolerance  4 weeks/ 8 visits  9/16/2022    MET10/28/2022   3.Patient will report >/= 35% improvement in tightness and function since evaluation to improve quality of life  4 weeks/ 8 visits  9/16/2022    MET10/28/2022   4. Pt will demonstrate and verbalize compliance and independence with HEP to improve therapy outcomes  4 weeks/ 8 visits  9/16/2022     MET10/28/2022   5.Patient will report </= 40% limitation on FOTO to improve activity participation  4 weeks/ 8 visits  9/16/2022    MET10/28/2022      LTG Weeks/Visits Date Established  Goal Status    1.Patient will report </= 3/10 pain in the morning for at worst pain to improve activity tolerance  8 weeks/ 12 visits  9/16/2022    MET    2. Patient will report >/= 50% improvement in tightness and function since evaluation to improve quality of life  8 weeks/ 12 visits  9/16/2022        MET   3.Patient will report </= 32% limitation on FOTO to improve activity participation  8 weeks/ 12 visits  9/16/2022    MET    4. Patient will increase BUE strength to >/= 4/5 to improve ability to lift objects overhead  8 weeks/ 12 visits  9/16/2022    MET    5. Patient will report a decrease in tenderness to palpation of cervical musculature since evaluation to improve ability to carry out household chores  8 weeks/ 12 visits  9/16/2022     MET       PLAN     Discharge Summary     Date of Last visit: 10/28/2022  Total Visits Received: 12        Assessment    Goals: MET    Discharge reason: Patient has met all of his/her goals, pt has reached maximum rehab potential at this time     Plan   This patient is discharged from Physical Therapy and is to cont with their HEP  and MD follow up PRN.        Noah Sanchez, PT  Board Certified Clinical Specialist in Orthopedic Physical Therapy

## 2022-10-28 NOTE — PROGRESS NOTES
Constitutional:  (-) fever, (-) chills, (-) lethargy  Eyes:  (-) eye pain (-) visual changes  ENMT: (-) nasal discharge, (-) sore throat. (-) neck pain or stiffness  Cardiac: (-) chest pain (-) palpitations  Respiratory:  (-) cough (-) respiratory distress.   GI:  (-) nausea (-) vomiting (-) diarrhea (-) abdominal pain.  :  (-) dysuria (-) frequency (-) burning.  MS:  (-) back pain (+) wrist pain  Neuro:  (-) headache (-) numbness (-) tingling (-) focal weakness  Skin:  (-) rash  Except as documented in the HPI,  all other systems are negative [facility]   Physical Therapy Treatment Note/Discharge Summary     Name: Erica Masterson  Clinic Number: [id number]    Therapy Diagnosis:   No diagnosis found.    Physician: Eunice Alvarez,*    Visit Date: 10/28/2022    Physician Orders: PT Eval and Treat  Medical Diagnosis from Referral: G24.3 (ICD-10-CM) - Cervical dystonia  Evaluation Date: 9/16/2022  Authorization Period Expiration: 10/14/2022  Plan of Care Expiration: 12/9/2022  Progress Note Due: 10/16/2022  Visit # / Visits authorized: 11/11   FOTO: 1/3     Precautions: Standard, Diabetes, and cancer     PTA Visit #: 0/5     Time In: 0800  Time Out: 0845  Total Billable Time: 35 minutes    SUBJECTIVE     Pt reports: she is feeling so much better, she is able to sleep now. States today will be her last day of therapy.     She was compliant with home exercise program.  Response to previous treatment: improvement in ADL's with less pain   Functional change: non-remarkable     Pain: 0/10  Location: left neck      OBJECTIVE     CMS Impairment/Limitation/Restriction for FOTO Neck Survey  Status Limitation G-Code CMS Severity Modifier  Intake 53% 47%  Predicted 59% 41% Goal Status+ CK - At least 40 percent but less than 60 percent  10/28/2022 66% 34% Current Status CJ - At least 20 percent but less than 40 percent    Treatment   Erica received the treatments listed below:      PT technician assisted with treatment under direct supervision of PT for 10 minutes      therapeutic exercises to develop strength, endurance, ROM, flexibility, and posture for 25  minutes including:    Seated thoracic extension x 20 repetitions   Open books x 20 repetitions bilaterally   Touch downs level 2,  [id number] with more focus on Left upper trap  Land mines 3x12 repetitions with left upper extremity       Not today:   Shoulder shrugs 5 pounds 2 x 15 repetitions   KT applied to left Rhomboid/mid trap and lower trap 1 I strip for facilitation 30%  tension applied by Ananya Tam PTA      manual therapy techniques: Joint mobilizations and Soft tissue Mobilization were applied to the: cervical spine for 8 minutes, including:    CT gapping mobilizations grade III   Thoracic PA mobilizations grade III     Not performed   O-A flexion mobs       neuromuscular re-education activities to improve: Coordination, Kinesthetic, Sense, Proprioception, and Posture for 12 minutes. The following activities were included:    Prone lower trap level II 3 seconds holds x 20 repetitions   Prone mid trap level II 3 seconds holds x 20 repetitions   Supine chin tuck x20 with 5s hold with towel under occiput        Patient Education and Home Exercises     Home Exercises Provided and Patient Education Provided     Education provided:   - Home Exercise Program     Written Home Exercises Provided: yes. Exercises were reviewed and Erica was able to demonstrate them prior to the end of the session.  Erica demonstrated good  understanding of the education provided. See EMR under Patient Instructions for exercises provided during therapy sessions    ASSESSMENT     Erica has been seen for a total of 12 visits and demonstrates significant improvement in symptoms and strength of scapular stabilizers. She reports ability to sleep and improved quality of life. She has met all goals and is appropriate for discharge from skilled outpatient PT at this time.      Erica Is progressing well towards her goals.   Pt prognosis is Fair.     Pt will continue to benefit from skilled outpatient physical therapy to address the deficits listed in the problem list box on initial evaluation, provide pt/family education and to maximize pt's level of independence in the home and community environment.     Pt's spiritual, cultural and educational needs considered and pt agreeable to plan of care and goals.     Anticipated barriers to physical therapy:  co morbidities, history of multiple rounds of therapy for  same diagnosis     Goals:     STG  Weeks/Visits Date Established  Goal Status    1.Patient will increase cervical AROM by 10 deg in all directions to improve functional mobility  4 weeks/ 8 visits  9/16/2022    MET10/28/2022    2. Patient will report </= 5/10 pain in the morning for at worst pain to improve activity tolerance  4 weeks/ 8 visits  9/16/2022    MET10/28/2022   3.Patient will report >/= 35% improvement in tightness and function since evaluation to improve quality of life  4 weeks/ 8 visits  9/16/2022    MET10/28/2022   4. Pt will demonstrate and verbalize compliance and independence with HEP to improve therapy outcomes  4 weeks/ 8 visits  9/16/2022     MET10/28/2022   5.Patient will report </= 40% limitation on FOTO to improve activity participation  4 weeks/ 8 visits  9/16/2022    MET10/28/2022      LTG Weeks/Visits Date Established  Goal Status    1.Patient will report </= 3/10 pain in the morning for at worst pain to improve activity tolerance  8 weeks/ 12 visits  9/16/2022    MET    2. Patient will report >/= 50% improvement in tightness and function since evaluation to improve quality of life  8 weeks/ 12 visits  9/16/2022        MET   3.Patient will report </= 32% limitation on FOTO to improve activity participation  8 weeks/ 12 visits  9/16/2022    MET    4. Patient will increase BUE strength to >/= 4/5 to improve ability to lift objects overhead  8 weeks/ 12 visits  9/16/2022    MET    5. Patient will report a decrease in tenderness to palpation of cervical musculature since evaluation to improve ability to carry out household chores  8 weeks/ 12 visits  9/16/2022     MET       PLAN     Discharge Summary     Date of Last visit: 10/28/2022  Total Visits Received: 12        Assessment    Goals: MET    Discharge reason: Patient has met all of his/her goals, pt has reached maximum rehab potential at this time     Plan   This patient is discharged from Physical Therapy and is to cont with their HEP  and MD follow up PRN.        Noah Sanhcez, PT  Board Certified Clinical Specialist in Orthopedic Physical Therapy      Noah Sanchez, PT

## 2022-10-31 ENCOUNTER — TELEPHONE (OUTPATIENT)
Dept: FAMILY MEDICINE | Facility: CLINIC | Age: 77
End: 2022-10-31
Payer: MEDICARE

## 2022-10-31 NOTE — TELEPHONE ENCOUNTER
----- Message from Shannan Leroy sent at 10/31/2022  8:00 AM CDT -----  Contact: self  Type: Needs Medical Advice  Who Called:  patient  Symptoms (please be specific):  having mucus drip in back of throat and her eyes red with mucus coming out mainly in morning  How long has patient had these symptoms:  She states she tried to treat w/OTC meds now for about 2 wks but the eyes and the drainage is getting worse  Pharmacy name and phone #:   [id number]  [place] - [address]  [place] [address]  Phone: [phone] Fax: [phone]  Best Call Back Number: [phone]  Additional Information: Thanks just call and let her know what the doctor wants her to do.  Thanks

## 2022-10-31 NOTE — TELEPHONE ENCOUNTER
Patient seen on 9-15-22 by Dr. Schwarz related to symptoms reported regarding eyes. Patient was advised an appointment for evaluation would be needed. Appointment scheduled for the date of 11-1-22. Patient agreed to appointment date, time, and location.

## 2022-11-01 ENCOUNTER — OFFICE VISIT (OUTPATIENT)
Dept: FAMILY MEDICINE | Facility: CLINIC | Age: 77
End: 2022-11-01
Payer: MEDICARE

## 2022-11-01 VITALS
SYSTOLIC BLOOD PRESSURE: 110 MMHG | TEMPERATURE: 98 F | DIASTOLIC BLOOD PRESSURE: 60 MMHG | HEART RATE: 83 BPM | HEIGHT: 64 IN | WEIGHT: 154.31 LBS | BODY MASS INDEX: 26.34 KG/M2 | OXYGEN SATURATION: 100 %

## 2022-11-01 DIAGNOSIS — H10.013 ACUTE FOLLICULAR CONJUNCTIVITIS OF BOTH EYES: Primary | ICD-10-CM

## 2022-11-01 DIAGNOSIS — B37.0 ORAL THRUSH: ICD-10-CM

## 2022-11-01 DIAGNOSIS — Z23 NEED FOR INFLUENZA VACCINATION: ICD-10-CM

## 2022-11-01 PROCEDURE — 3288F FALL RISK ASSESSMENT DOCD: CPT | Mod: CPTII,S$GLB,, | Performed by: NURSE PRACTITIONER

## 2022-11-01 PROCEDURE — 1160F PR REVIEW ALL MEDS BY PRESCRIBER/CLIN PHARMACIST DOCUMENTED: ICD-10-PCS | Mod: CPTII,S$GLB,, | Performed by: NURSE PRACTITIONER

## 2022-11-01 PROCEDURE — 3078F PR MOST RECENT DIASTOLIC BLOOD PRESSURE < 80 MM HG: ICD-10-PCS | Mod: CPTII,S$GLB,, | Performed by: NURSE PRACTITIONER

## 2022-11-01 PROCEDURE — 90694 VACC AIIV4 NO PRSRV 0.5ML IM: CPT | Mod: S$GLB,,, | Performed by: NURSE PRACTITIONER

## 2022-11-01 PROCEDURE — 3074F SYST BP LT 130 MM HG: CPT | Mod: CPTII,S$GLB,, | Performed by: NURSE PRACTITIONER

## 2022-11-01 PROCEDURE — 1159F MED LIST DOCD IN RCRD: CPT | Mod: CPTII,S$GLB,, | Performed by: NURSE PRACTITIONER

## 2022-11-01 PROCEDURE — 1159F PR MEDICATION LIST DOCUMENTED IN MEDICAL RECORD: ICD-10-PCS | Mod: CPTII,S$GLB,, | Performed by: NURSE PRACTITIONER

## 2022-11-01 PROCEDURE — 99999 PR PBB SHADOW E&M-EST. PATIENT-LVL V: CPT | Mod: PBBFAC,,, | Performed by: NURSE PRACTITIONER

## 2022-11-01 PROCEDURE — 1160F RVW MEDS BY RX/DR IN RCRD: CPT | Mod: CPTII,S$GLB,, | Performed by: NURSE PRACTITIONER

## 2022-11-01 PROCEDURE — G0008 ADMIN INFLUENZA VIRUS VAC: HCPCS | Mod: S$GLB,,, | Performed by: NURSE PRACTITIONER

## 2022-11-01 PROCEDURE — 99999 PR PBB SHADOW E&M-EST. PATIENT-LVL V: ICD-10-PCS | Mod: PBBFAC,,, | Performed by: NURSE PRACTITIONER

## 2022-11-01 PROCEDURE — G0008 FLU VACCINE - QUADRIVALENT - ADJUVANTED: ICD-10-PCS | Mod: S$GLB,,, | Performed by: NURSE PRACTITIONER

## 2022-11-01 PROCEDURE — 99213 PR OFFICE/OUTPT VISIT, EST, LEVL III, 20-29 MIN: ICD-10-PCS | Mod: S$GLB,,, | Performed by: NURSE PRACTITIONER

## 2022-11-01 PROCEDURE — 90694 FLU VACCINE - QUADRIVALENT - ADJUVANTED: ICD-10-PCS | Mod: S$GLB,,, | Performed by: NURSE PRACTITIONER

## 2022-11-01 PROCEDURE — 1101F PT FALLS ASSESS-DOCD LE1/YR: CPT | Mod: CPTII,S$GLB,, | Performed by: NURSE PRACTITIONER

## 2022-11-01 PROCEDURE — 99213 OFFICE O/P EST LOW 20 MIN: CPT | Mod: S$GLB,,, | Performed by: NURSE PRACTITIONER

## 2022-11-01 PROCEDURE — 3074F PR MOST RECENT SYSTOLIC BLOOD PRESSURE < 130 MM HG: ICD-10-PCS | Mod: CPTII,S$GLB,, | Performed by: NURSE PRACTITIONER

## 2022-11-01 PROCEDURE — 3078F DIAST BP <80 MM HG: CPT | Mod: CPTII,S$GLB,, | Performed by: NURSE PRACTITIONER

## 2022-11-01 PROCEDURE — 1125F PR PAIN SEVERITY QUANTIFIED, PAIN PRESENT: ICD-10-PCS | Mod: CPTII,S$GLB,, | Performed by: NURSE PRACTITIONER

## 2022-11-01 PROCEDURE — 1125F AMNT PAIN NOTED PAIN PRSNT: CPT | Mod: CPTII,S$GLB,, | Performed by: NURSE PRACTITIONER

## 2022-11-01 PROCEDURE — 3288F PR FALLS RISK ASSESSMENT DOCUMENTED: ICD-10-PCS | Mod: CPTII,S$GLB,, | Performed by: NURSE PRACTITIONER

## 2022-11-01 PROCEDURE — 1101F PR PT FALLS ASSESS DOC 0-1 FALLS W/OUT INJ PAST YR: ICD-10-PCS | Mod: CPTII,S$GLB,, | Performed by: NURSE PRACTITIONER

## 2022-11-01 RX ORDER — TOBRAMYCIN AND DEXAMETHASONE 3; 1 MG/ML; MG/ML
1 SUSPENSION/ DROPS OPHTHALMIC 4 TIMES DAILY
Qty: 5 ML | Refills: 0 | Status: SHIPPED | OUTPATIENT
Start: 2022-11-01 | End: 2023-01-10

## 2022-11-01 RX ORDER — NYSTATIN 100000 [USP'U]/ML
6 SUSPENSION ORAL 4 TIMES DAILY
Qty: 240 ML | Refills: 0 | Status: SHIPPED | OUTPATIENT
Start: 2022-11-01 | End: 2022-11-11

## 2022-11-01 NOTE — PROGRESS NOTES
Subjective:       Patient ID: Erica Masterson is a 77 y.o. female.    Chief Complaint: post nasal drip and eye irritation/drainage     HPI   78 y/o female patient with medical problems listed below presents for b/l eye redness, irritation/drainage with whitish crusty and mucus for a few months. Patient states she was provided eye drop which relived the symptoms but still has the symptoms off and on. Denies eye pain, itchiness. Denies sinus pain, cough, chest pain, sob, nausea, abdominal pain, fever, chills, generalized body ache. Patient states has had oral thrush with no relief of over the counter nystatin.    Patient Active Problem List   Diagnosis    Diverticulosis    Keratoconjunctivitis, unspecified    Myopathy, unspecified    Hyperlipidemia associated with type 2 diabetes mellitus, baseline     Depression    Chronic low back pain    Degenerative arthritis of knee    Hereditary and idiopathic peripheral neuropathy    Hypertension associated with diabetes    PAD (peripheral artery disease)    GERD (gastroesophageal reflux disease)    Cardiovascular event risk, ASCVD 10-year risk 13%, 2010    Hypertensive left ventricular hypertrophy, without heart failure    SI (sacroiliac) joint dysfunction    PAC (premature atrial contraction), frequent on Holter, 34%, over 33,200    Atrial septal aneurysm    Sleep disorder    History of colon polyps    Type 2 diabetes mellitus, without long-term current use of insulin, dx 3/2020    Constipation    Iron deficiency anemia    RONNIE (generalized anxiety disorder), 2019    Elevated ALT measurement    Stage 3 chronic kidney disease    Biliary colic    Age-related osteoporosis without current pathological fracture    Vitamin D insufficiency    Thyroid cancer    Postoperative hypothyroidism    Spinal accessory nerve disorder    Decreased range of motion of intervertebral discs of cervical spine    Muscle spasms of both lower extremities    Osteopenia of lumbar spine    Metastasis  from thyroid cancer    Hypocalcemia    Chest pain    Poor posture    Decreased strength of upper extremity      Review of patient's allergies indicates:  No Known Allergies    Past Surgical History:   Procedure Laterality Date    BREAST BIOPSY Left 2015    benign    CHOLECYSTECTOMY      COLONOSCOPY  12/2/15    Dr. Britton, multiple polyps, recheck five years-three years if more than 2 polyps are adenomatous    COLONOSCOPY N/A 12/2/2015    COLONOSCOPY N/A 3/20/2019    Procedure: COLONOSCOPY;  Surgeon: Jose Britton MD;  Location: [facility];  Service: Endoscopy;  Laterality: N/A;    COLONOSCOPY N/A 5/20/2020    Dr. Britton; internal hemorrhoids; diverticulosis; polyps removed; repeat in 3 years    CYSTOSCOPY N/A 8/26/2020    Procedure: CYSTOSCOPY;  Surgeon: Charlotte Walters Jr., MD;  Location: [facility];  Service: Urology;  Laterality: N/A;    DISSECTION OF NECK Left 9/13/2021    Procedure: DISSECTION, NECK;  Surgeon: Ryan Torres MD;  Location: [facility] OR 2ND FLR;  Service: ENT;  Laterality: Left;    ESOPHAGEAL MANOMETRY WITH MEASUREMENT OF IMPEDANCE N/A 8/22/2022    Procedure: MANOMETRY, ESOPHAGUS, WITH IMPEDANCE MEASUREMENT;  Surgeon: Pantera Mcguire MD;  Location: [facility] (4TH FLR);  Service: Endoscopy;  Laterality: N/A;  8/4 fully vaccinated; instructions mailed-st    ESOPHAGOGASTRODUODENOSCOPY N/A 5/20/2020    Dr. Britton; small hiatal hernia; gastritis; 8 gastric polyps removed    ESOPHAGOGASTRODUODENOSCOPY N/A 4/1/2022    Procedure: EGD (ESOPHAGOGASTRODUODENOSCOPY);  Surgeon: Jose Britton MD;  Location: [facility];  Service: Endoscopy;  Laterality: N/A;    FOOT SURGERY      HYSTERECTOMY      TVH/BSO > 20 years    INTRALUMINAL GASTROINTESTINAL TRACT IMAGING VIA CAPSULE N/A 6/4/2020    Procedure: IMAGING PROCEDURE, GI TRACT, INTRALUMINAL, VIA CAPSULE;  Surgeon: Jose Britton MD;  Location: [facility];  Service: Endoscopy;  Laterality: N/A;    KNEE SURGERY  06/06/2013    right knee tear Dr Iverson      LAPAROSCOPIC CHOLECYSTECTOMY N/A 9/17/2020    Procedure: CHOLECYSTECTOMY, LAPAROSCOPIC;  Surgeon: Forrest Veronica MD;  Location: [facility] OR;  Service: General;  Laterality: N/A;    LUNG LOBECTOMY  1966    right middle lobectomy, due to Tb    OOPHORECTOMY      SHOULDER SURGERY      francis     THYROIDECTOMY Bilateral 5/19/2021    Procedure: THYROIDECTOMY;  Surgeon: Jamia Amezquita MD;  [facility];  Service: General;  Laterality: Bilateral;    THYROIDECTOMY Bilateral 9/13/2021    Procedure: THYROIDECTOMY WITH INTRA-OP PTH;  Surgeon: Ryan Torres MD;  [facility];  Service: ENT;  Laterality: Bilateral;  NIM tube  Intraop PTH        Current Outpatient Medications:     amitriptyline (ELAVIL) 25 MG tablet, Take 1 tablet (25 mg total) by mouth every evening., Disp: 30 tablet, Rfl: 3    amLODIPine (NORVASC) 2.5 MG tablet, Take 1 tablet by mouth once daily, Disp: 90 tablet, Rfl: 0    artificial tears (ISOPTO TEARS) 0.5 % ophthalmic solution, 1 drop as needed., Disp: , Rfl:     blood sugar diagnostic (BLOOD GLUCOSE TEST) Strp, True Metrix glucose strips check once daily, Disp: 100 each, Rfl: 3    conjugated estrogens (PREMARIN) vaginal cream, Place 0.5 g vaginally once daily AND 0.5 g twice a week., Disp: 30 g, Rfl: 7    coQ10, ubiquinol, 100 mg Cap, Take 100 mg by mouth once daily., Disp: , Rfl:     doxepin (SINEQUAN) 25 MG capsule, Take 25 mg by mouth every evening., Disp: , Rfl:     DULoxetine (CYMBALTA) 30 MG capsule, Take 1 capsule (30 mg total) by mouth once daily., Disp: 30 capsule, Rfl: 11    ergocalciferol (ERGOCALCIFEROL) 50,000 unit Cap, Take 1 capsule (50,000 Units total) by mouth every 30 days., Disp: 3 capsule, Rfl: 3    gabapentin (NEURONTIN) 300 MG capsule, Take 300 mg by mouth 3 (three) times daily., Disp: , Rfl:     levothyroxine (SYNTHROID) 75 MCG tablet, Take 1 tablet (75 mcg total) by mouth before breakfast., Disp: 90 tablet, Rfl: 3    losartan (COZAAR) 100 MG tablet,  Take 1 tablet by mouth once daily, Disp: 90 tablet, Rfl: 1    rosuvastatin (CRESTOR) 20 MG tablet, Take 0.5 tablets (10 mg total) by mouth every other day., Disp: 90 tablet, Rfl: 0    baclofen (LIORESAL) 10 MG tablet, Take 1 tablet (10 mg total) by mouth 3 (three) times daily. Start w. Half a tab first 7 days (Patient not taking: Reported on 9/8/2022), Disp: 90 tablet, Rfl: 6    blood-glucose meter kit, True Metrix glucometer check glucose once daily, Disp: 1 each, Rfl: 0    calcium carbonate (TUMS) 200 mg calcium (500 mg) chewable tablet, Chew and swallow 2 tablets (1,000 mg total) by mouth 3 (three) times daily. for 14 days, Disp: 100 tablet, Rfl: 0    mirtazapine (REMERON) 15 MG tablet, Take 1 tablet (15 mg total) by mouth every evening., Disp: 30 tablet, Rfl: 0    nystatin (MYCOSTATIN) 100,000 unit/mL suspension, Take 6 mLs (600,000 Units total) by mouth 4 (four) times daily. for 10 days, Disp: 240 mL, Rfl: 0    tobramycin-dexAMETHasone 0.3-0.1% (TOBRADEX) 0.3-0.1 % DrpS, Place 1 drop into both eyes 4 (four) times daily., Disp: 5 mL, Rfl: 0    Lab Results   Component Value Date    WBC 3.26 (L) 08/17/2022    HGB 10.4 (L) 08/17/2022    HCT 32.3 (L) 08/17/2022     08/17/2022    CHOL 154 08/08/2022    TRIG 43 08/08/2022    HDL 59 08/08/2022    ALT 12 09/30/2022    AST 15 09/30/2022     09/30/2022    K 4.5 09/30/2022     09/30/2022    CREATININE 1.0 09/30/2022    BUN 18 09/30/2022    CO2 30 (H) 09/30/2022    TSH 0.049 (L) 09/30/2022    INR 1.0 08/16/2022    GLUF 104 05/22/2004    HGBA1C 6.0 (H) 08/08/2022     Above labs reviwed    Review of Systems   Constitutional:  Negative for chills and fever.   HENT:  Positive for mouth sores and postnasal drip. Negative for congestion, ear pain and sore throat.    Eyes:  Positive for discharge and redness. Negative for photophobia, pain, itching and visual disturbance.   Respiratory:  Negative for cough, chest tightness and shortness of breath.   "  Cardiovascular:  Negative for chest pain and palpitations.   Gastrointestinal:  Negative for abdominal pain.   Neurological:  Negative for dizziness and headaches.       Objective:   /60 (BP Location: Right arm, Patient Position: Sitting, BP Method: Medium (Manual))   Pulse 83   Temp 97.6 °F (36.4 °C) (Oral)   Ht 5' 4" (1.626 m)   Wt 70 kg (154 lb 5.2 oz)   SpO2 100%   BMI 26.49 kg/m²       Physical Exam  Constitutional:       General: She is not in acute distress.     Appearance: Normal appearance.   HENT:      Head: Atraumatic.   Eyes:      General:         Right eye: Discharge present.         Left eye: Discharge present.     Conjunctiva/sclera:      Right eye: Right conjunctiva is injected.      Left eye: Left conjunctiva is injected.   Cardiovascular:      Rate and Rhythm: Normal rate and regular rhythm.      Pulses: Normal pulses.      Heart sounds: Normal heart sounds.   Pulmonary:      Effort: Pulmonary effort is normal.      Breath sounds: Normal breath sounds.   Abdominal:      General: Abdomen is flat. Bowel sounds are normal.      Palpations: Abdomen is soft.   Skin:     General: Skin is warm and dry.   Neurological:      General: No focal deficit present.      Mental Status: She is alert and oriented to person, place, and time.   Psychiatric:         Mood and Affect: Mood normal.       Assessment:       1. Acute follicular conjunctivitis of both eyes    2. Oral thrush    3. Need for influenza vaccination        Plan:       1. Acute follicular conjunctivitis of both eyes  - tobramycin-dexAMETHasone 0.3-0.1% (TOBRADEX) 0.3-0.1 % DrpS; Place 1 drop into both eyes 4 (four) times daily.  Dispense: 5 mL; Refill: 0  - advised to follow with opthalmology if no improvement   - Ambulatory referral/consult to Ophthalmology; Future    2. Oral thrush  - nystatin (MYCOSTATIN) 100,000 unit/mL suspension; Take 6 mLs (600,000 Units total) by mouth 4 (four) times daily. for 10 days  Dispense: 240 mL; " Refill: 0    3. Need for influenza vaccination  - today       Patient with be reevaluated in  as needed  or sooner chuy Camargo NP

## 2022-11-08 ENCOUNTER — TELEPHONE (OUTPATIENT)
Dept: FAMILY MEDICINE | Facility: CLINIC | Age: 77
End: 2022-11-08
Payer: MEDICARE

## 2022-11-08 ENCOUNTER — TELEPHONE (OUTPATIENT)
Dept: ENDOCRINOLOGY | Facility: CLINIC | Age: 77
End: 2022-11-08
Payer: MEDICARE

## 2022-11-08 DIAGNOSIS — E89.0 POST-SURGICAL HYPOTHYROIDISM: Primary | ICD-10-CM

## 2022-11-08 NOTE — TELEPHONE ENCOUNTER
Patient returned call to office. Reports experiencing nausea, headaches, and weakness. Appointment scheduled for evaluation. Patient agreed to appointment date, time, and location.

## 2022-11-08 NOTE — TELEPHONE ENCOUNTER
----- Message from Anand Roque MA sent at 11/8/2022  9:38 AM CST -----  Contact: Patient  Patient states she may be having side effect for her medication.Her symptoms are nausea,sweating and bubbling stomach which has been happening at night for a few months now.Patient is also requesting a recheck blood work.  ----- Message -----  From: Shruti Jones  Sent: 11/8/2022   7:34 AM CST  To: [facility] Staff    Type:  Needs Medical Advice    Who Called:  Patient       Would the patient rather a call back or a response via [facility]?  Call    Best Call Back Number:    [phone]    Additional Information:  Patient needs to speak to the nurse about some side affects she is having with her Thyroid medication     Please call to advise

## 2022-11-08 NOTE — TELEPHONE ENCOUNTER
Spoke to patient and relayed Dr. Landrum's message in regards to her side effect from her medication.

## 2022-11-08 NOTE — TELEPHONE ENCOUNTER
----- Message from William Alvarado sent at 11/8/2022  2:04 PM CST -----  Type: Patient Call Back         Who called:Pt          What is the request in detail: Pt called stating that she has been feeling weak for a few weeks now and her hands becomes shaky as well . What should she do ?         Can the clinic reply by [facility]? No          Would the patient rather a call back or a response via [facility]? Call back          Best call back number:[phone] (mobile)          Additional Information:           Thank You

## 2022-11-08 NOTE — TELEPHONE ENCOUNTER
----- Message from William Alvarado sent at 11/8/2022  2:04 PM CST -----  Type: Patient Call Back         Who called:Pt          What is the request in detail: Pt called stating that she has been feeling weak for a few weeks now and her hands becomes shaky as well . What should she do ?         Can the clinic reply by [facility]? No          Would the patient rather a call back or a response via [facility]? Call back          Best call back number:[phone] (mobile)          Additional Information:           Thank You

## 2022-11-09 ENCOUNTER — TELEPHONE (OUTPATIENT)
Dept: CARDIOLOGY | Facility: CLINIC | Age: 77
End: 2022-11-09
Payer: MEDICARE

## 2022-11-09 ENCOUNTER — LAB VISIT (OUTPATIENT)
Dept: LAB | Facility: HOSPITAL | Age: 77
End: 2022-11-09
Attending: INTERNAL MEDICINE
Payer: MEDICARE

## 2022-11-09 ENCOUNTER — OFFICE VISIT (OUTPATIENT)
Dept: FAMILY MEDICINE | Facility: CLINIC | Age: 77
End: 2022-11-09
Payer: MEDICARE

## 2022-11-09 VITALS
BODY MASS INDEX: 26.15 KG/M2 | OXYGEN SATURATION: 99 % | TEMPERATURE: 98 F | HEART RATE: 86 BPM | DIASTOLIC BLOOD PRESSURE: 62 MMHG | WEIGHT: 152.31 LBS | RESPIRATION RATE: 16 BRPM | SYSTOLIC BLOOD PRESSURE: 114 MMHG

## 2022-11-09 DIAGNOSIS — R68.2 DRY MOUTH: ICD-10-CM

## 2022-11-09 DIAGNOSIS — R14.0 SENSATION OF GASEOUS ABDOMINAL FULLNESS: ICD-10-CM

## 2022-11-09 DIAGNOSIS — D64.9 ANEMIA, UNSPECIFIED TYPE: ICD-10-CM

## 2022-11-09 DIAGNOSIS — E11.59 HYPERTENSION ASSOCIATED WITH DIABETES: Chronic | ICD-10-CM

## 2022-11-09 DIAGNOSIS — R11.0 NAUSEA: ICD-10-CM

## 2022-11-09 DIAGNOSIS — E89.0 POST-SURGICAL HYPOTHYROIDISM: ICD-10-CM

## 2022-11-09 DIAGNOSIS — E89.0 POSTOPERATIVE HYPOTHYROIDISM: ICD-10-CM

## 2022-11-09 DIAGNOSIS — D64.9 ANEMIA, UNSPECIFIED TYPE: Primary | ICD-10-CM

## 2022-11-09 DIAGNOSIS — I15.2 HYPERTENSION ASSOCIATED WITH DIABETES: Chronic | ICD-10-CM

## 2022-11-09 LAB
IRON SERPL-MCNC: 57 UG/DL (ref 30–160)
SATURATED IRON: 16 % (ref 20–50)
TOTAL IRON BINDING CAPACITY: 351 UG/DL (ref 250–450)
TRANSFERRIN SERPL-MCNC: 237 MG/DL (ref 200–375)

## 2022-11-09 PROCEDURE — 99214 OFFICE O/P EST MOD 30 MIN: CPT | Mod: S$GLB,,, | Performed by: PHYSICIAN ASSISTANT

## 2022-11-09 PROCEDURE — 84439 ASSAY OF FREE THYROXINE: CPT | Performed by: INTERNAL MEDICINE

## 2022-11-09 PROCEDURE — 3078F PR MOST RECENT DIASTOLIC BLOOD PRESSURE < 80 MM HG: ICD-10-PCS | Mod: CPTII,S$GLB,, | Performed by: PHYSICIAN ASSISTANT

## 2022-11-09 PROCEDURE — 3078F DIAST BP <80 MM HG: CPT | Mod: CPTII,S$GLB,, | Performed by: PHYSICIAN ASSISTANT

## 2022-11-09 PROCEDURE — 99999 PR PBB SHADOW E&M-EST. PATIENT-LVL IV: CPT | Mod: PBBFAC,,, | Performed by: PHYSICIAN ASSISTANT

## 2022-11-09 PROCEDURE — 1159F MED LIST DOCD IN RCRD: CPT | Mod: CPTII,S$GLB,, | Performed by: PHYSICIAN ASSISTANT

## 2022-11-09 PROCEDURE — 99214 PR OFFICE/OUTPT VISIT, EST, LEVL IV, 30-39 MIN: ICD-10-PCS | Mod: S$GLB,,, | Performed by: PHYSICIAN ASSISTANT

## 2022-11-09 PROCEDURE — 1125F AMNT PAIN NOTED PAIN PRSNT: CPT | Mod: CPTII,S$GLB,, | Performed by: PHYSICIAN ASSISTANT

## 2022-11-09 PROCEDURE — 36415 COLL VENOUS BLD VENIPUNCTURE: CPT | Mod: PO | Performed by: PHYSICIAN ASSISTANT

## 2022-11-09 PROCEDURE — 3074F SYST BP LT 130 MM HG: CPT | Mod: CPTII,S$GLB,, | Performed by: PHYSICIAN ASSISTANT

## 2022-11-09 PROCEDURE — 3074F PR MOST RECENT SYSTOLIC BLOOD PRESSURE < 130 MM HG: ICD-10-PCS | Mod: CPTII,S$GLB,, | Performed by: PHYSICIAN ASSISTANT

## 2022-11-09 PROCEDURE — 84466 ASSAY OF TRANSFERRIN: CPT | Performed by: PHYSICIAN ASSISTANT

## 2022-11-09 PROCEDURE — 1125F PR PAIN SEVERITY QUANTIFIED, PAIN PRESENT: ICD-10-PCS | Mod: CPTII,S$GLB,, | Performed by: PHYSICIAN ASSISTANT

## 2022-11-09 PROCEDURE — 84443 ASSAY THYROID STIM HORMONE: CPT | Performed by: INTERNAL MEDICINE

## 2022-11-09 PROCEDURE — 85025 COMPLETE CBC W/AUTO DIFF WBC: CPT | Performed by: PHYSICIAN ASSISTANT

## 2022-11-09 PROCEDURE — 1159F PR MEDICATION LIST DOCUMENTED IN MEDICAL RECORD: ICD-10-PCS | Mod: CPTII,S$GLB,, | Performed by: PHYSICIAN ASSISTANT

## 2022-11-09 PROCEDURE — 86038 ANTINUCLEAR ANTIBODIES: CPT | Performed by: PHYSICIAN ASSISTANT

## 2022-11-09 PROCEDURE — 82728 ASSAY OF FERRITIN: CPT | Performed by: PHYSICIAN ASSISTANT

## 2022-11-09 PROCEDURE — 99999 PR PBB SHADOW E&M-EST. PATIENT-LVL IV: ICD-10-PCS | Mod: PBBFAC,,, | Performed by: PHYSICIAN ASSISTANT

## 2022-11-09 NOTE — TELEPHONE ENCOUNTER
----- Message from Beatriz Arnold sent at 11/9/2022  4:29 PM CST -----  Regarding: appt req  Name of Who is Calling:TODD RODRIGUEZ [[id number]]          What is the request in detail: Laura need a annual appt          Can the clinic reply by [facility]:          [facility] [phone] (home) [phone]

## 2022-11-09 NOTE — PROGRESS NOTES
"Subjective:       Patient ID: Erica Masterson is a 77 y.o. female.    Chief Complaint: No chief complaint on file.    Patient presents for evaluation of several concerns feet.  For she is complaining of recurrent thrush in her mouth.  She is using nystatin without much improvement.  She states that this has occurred since having her thyroid surgery.  She wears dentures and has had evaluation with her dentist.  She feels that the tongue is dry and irritated.  She also uses Biotene mouthwash for dry mouth without relief.  She also is noted to have dry eyes.  She also continues to complain of nausea and "bubbling sensation in her stomach".  This has been occurring since having her thyroid surgery as well.  She feels that her thyroid replacement medication may be contributing to the symptoms since she takes it on an empty stomach in the morning time.  She held her medication for 1 day and states that the symptoms improved.  Her symptoms are worse when she lays down at bedtime.  Patients patient medical/surgical, social and family histories have been reviewed       Review of Systems   HENT:  Positive for mouth sores.    Gastrointestinal:  Positive for abdominal distention and nausea. Negative for abdominal pain, anal bleeding, blood in stool, constipation, diarrhea and vomiting.     Objective:      Physical Exam  Constitutional:       General: She is not in acute distress.     Appearance: Normal appearance. She is well-developed. She is not ill-appearing.   HENT:      Head: Normocephalic and atraumatic.      Mouth/Throat:      Mouth: Mucous membranes are dry.      Tongue: No lesions.      Palate: No mass and lesions.      Pharynx: Oropharynx is clear.      Comments: No white coating   Eyes:      Conjunctiva/sclera: Conjunctivae normal.   Cardiovascular:      Rate and Rhythm: Normal rate and regular rhythm.      Heart sounds: Normal heart sounds.   Pulmonary:      Effort: Pulmonary effort is normal.      Breath sounds: " "Normal breath sounds.   Abdominal:      General: Abdomen is flat. Bowel sounds are normal.      Palpations: Abdomen is soft.      Tenderness: There is no abdominal tenderness.   Musculoskeletal:      Right lower leg: No edema.      Left lower leg: No edema.   Skin:     General: Skin is warm and dry.   Neurological:      Mental Status: She is alert.   Psychiatric:         Mood and Affect: Mood normal.         Behavior: Behavior is cooperative.       Assessment:       1. Anemia, unspecified type    2. Dry mouth    3. Nausea    4. Sensation of gaseous abdominal fullness    5. Postoperative hypothyroidism    6. Hypertension associated with diabetes          Plan:       Diagnoses and all orders for this visit:    Anemia, unspecified type  -     CBC Auto Differential; Future  -     Iron and TIBC; Future  -     FERRITIN; Future    Dry mouth  -     YARELIS; Future  Likely secondary to medications   Continue biotene     Nausea    Sensation of gaseous abdominal fullness    Postoperative hypothyroidism    Hypertension associated with diabetes   At goal   Continue current medications       Follow up endocrinology and GI is recommended           Documentation entered by me for this encounter may have been done in part using speech-recognition technology. Although I have made an effort to ensure accuracy, "sound like" errors may exist and should be interpreted in context.   I spent a total of 43 minutes on the day of the visit.This includes face to face time and non-face to face time preparing to see the patient (eg, review of tests), obtaining and/or reviewing separately obtained history, documenting clinical information in the electronic or other health record, independently interpreting results and communicating results to the patient/family/caregiver, or care coordinator.      "

## 2022-11-10 ENCOUNTER — TELEPHONE (OUTPATIENT)
Dept: ENDOCRINOLOGY | Facility: CLINIC | Age: 77
End: 2022-11-10
Payer: MEDICARE

## 2022-11-10 DIAGNOSIS — E89.0 POST-SURGICAL HYPOTHYROIDISM: Primary | ICD-10-CM

## 2022-11-10 LAB
ANA SER QL IF: NORMAL
BASOPHILS # BLD AUTO: 0.03 K/UL (ref 0–0.2)
BASOPHILS NFR BLD: 0.7 % (ref 0–1.9)
DIFFERENTIAL METHOD: ABNORMAL
EOSINOPHIL # BLD AUTO: 0.2 K/UL (ref 0–0.5)
EOSINOPHIL NFR BLD: 3.3 % (ref 0–8)
ERYTHROCYTE [DISTWIDTH] IN BLOOD BY AUTOMATED COUNT: 13.1 % (ref 11.5–14.5)
FERRITIN SERPL-MCNC: 116 NG/ML (ref 20–300)
HCT VFR BLD AUTO: 39 % (ref 37–48.5)
HGB BLD-MCNC: 12.2 G/DL (ref 12–16)
IMM GRANULOCYTES # BLD AUTO: 0.01 K/UL (ref 0–0.04)
IMM GRANULOCYTES NFR BLD AUTO: 0.2 % (ref 0–0.5)
LYMPHOCYTES # BLD AUTO: 1.6 K/UL (ref 1–4.8)
LYMPHOCYTES NFR BLD: 35.8 % (ref 18–48)
MCH RBC QN AUTO: 28.6 PG (ref 27–31)
MCHC RBC AUTO-ENTMCNC: 31.3 G/DL (ref 32–36)
MCV RBC AUTO: 92 FL (ref 82–98)
MONOCYTES # BLD AUTO: 0.3 K/UL (ref 0.3–1)
MONOCYTES NFR BLD: 6.2 % (ref 4–15)
NEUTROPHILS # BLD AUTO: 2.4 K/UL (ref 1.8–7.7)
NEUTROPHILS NFR BLD: 53.8 % (ref 38–73)
NRBC BLD-RTO: 0 /100 WBC
PLATELET # BLD AUTO: 348 K/UL (ref 150–450)
PMV BLD AUTO: 9.3 FL (ref 9.2–12.9)
RBC # BLD AUTO: 4.26 M/UL (ref 4–5.4)
T4 FREE SERPL-MCNC: 0.97 NG/DL (ref 0.71–1.51)
TSH SERPL DL<=0.005 MIU/L-ACNC: 0.11 UIU/ML (ref 0.4–4)
WBC # BLD AUTO: 4.5 K/UL (ref 3.9–12.7)

## 2022-11-10 NOTE — TELEPHONE ENCOUNTER
----- Message from Juan M Landrum MD sent at 11/10/2022 12:50 PM CST -----  Labs reviewed.  Please contact patient and let her know:   1. TSH improved but still low, meaning the dose is slightly too strong.     Continue levothyroxine 75 micrograms per day EXCEPT skip sundays   2. Repeat labs after 6-8 weeks. (Already has labs scheduled for late December)     Thanks,   - Juan M

## 2022-11-10 NOTE — TELEPHONE ENCOUNTER
Contacted patient and informed her of 's message regarding her labs. Patient verbalized understanding.

## 2022-11-10 NOTE — TELEPHONE ENCOUNTER
----- Message from Jessica Garcia sent at 11/10/2022  3:48 PM CST -----  Contact: patient  Type:  Patient Returning Call    Who Called:  patient   Who Left Message for Patient:  chey  Does the patient know what this is regarding?:    Best Call Back Number:  [phone] (home)   Additional Information:

## 2022-11-10 NOTE — TELEPHONE ENCOUNTER
Tried to call patient no answer and no vm set up. Will try again. Dr. Robles does not go to [facility] office anymore.

## 2022-11-11 ENCOUNTER — TELEPHONE (OUTPATIENT)
Dept: FAMILY MEDICINE | Facility: CLINIC | Age: 77
End: 2022-11-11
Payer: MEDICARE

## 2022-11-11 ENCOUNTER — TELEPHONE (OUTPATIENT)
Dept: GASTROENTEROLOGY | Facility: CLINIC | Age: 77
End: 2022-11-11
Payer: MEDICARE

## 2022-11-11 ENCOUNTER — TELEPHONE (OUTPATIENT)
Dept: ENDOCRINOLOGY | Facility: CLINIC | Age: 77
End: 2022-11-11
Payer: MEDICARE

## 2022-11-11 NOTE — TELEPHONE ENCOUNTER
----- Message from Narciso Fontana sent at 11/11/2022  3:28 PM CST -----  Contact: self  Type: Needs Medical Advice  Who Called: Patient   Best Call Back Number: [phone]  Additional Information: Patient is still nausea. When pt is laying down still having bubbling in her stomach. States  she is elevating the bed but still having problems. Plz call to medically advise/ Thanks

## 2022-11-11 NOTE — TELEPHONE ENCOUNTER
----- Message from Jessica Garcia sent at 11/10/2022  3:50 PM CST -----  Contact: patient  Type:  Test Results    Who Called:  patient  Name of Test (Lab/Mammo/Etc):  labs  Date of Test:  11/9  Ordering Provider:  bianka  Where the test was performed:    Best Call Back Number:  [phone] (home)   Additional Information:

## 2022-11-11 NOTE — TELEPHONE ENCOUNTER
----- Message from Tim Mckeon sent at 11/11/2022  7:15 AM CST -----  Type:  Patient Returning Call    Who Called:  Patient  Who Left Message for Patient:  Anand  Does the patient know what this is regarding?:  levothyroxine (SYNTHROID) 75 MCG tablet  Best Call Back Number:  [phone]  Additional Information:

## 2022-11-11 NOTE — TELEPHONE ENCOUNTER
Returned call to patient to assist. Patient states that she is nauseated and experiencing gas bubbles at night. Reviewed patient's medication and she is currently not prescribed anything for gas. Will forward patient's concerns to provider.

## 2022-11-14 ENCOUNTER — TELEPHONE (OUTPATIENT)
Dept: GASTROENTEROLOGY | Facility: CLINIC | Age: 77
End: 2022-11-14
Payer: MEDICARE

## 2022-11-14 NOTE — TELEPHONE ENCOUNTER
----- Message from Deja Mendoza NP sent at 11/13/2022  9:23 AM CST -----  Regarding: FW: nausea and gas  Contact: self  Please schedule patient for a follow-up visit.    Sincerely,  Deja Mendoza NP  ----- Message -----  From: Lindsay Ramirez LPN  Sent: 11/11/2022   4:16 PM CST  To: Deja Mendoza NP  Subject: nausea and gas                                   Please advise.   ----- Message -----  From: Narciso Fontana  Sent: 11/11/2022   3:30 PM CST  To: Tobi Gacria Staff    Type: Needs Medical Advice  Who Called: Patient   Best Call Back Number: [phone]  Additional Information: Patient is still nausea. When pt is laying down still having bubbling in her stomach. States  she is elevating the bed but still having problems. Plz call to medically advise/ Thanks

## 2022-11-15 ENCOUNTER — PES CALL (OUTPATIENT)
Dept: ADMINISTRATIVE | Facility: CLINIC | Age: 77
End: 2022-11-15
Payer: MEDICARE

## 2022-11-29 ENCOUNTER — TELEPHONE (OUTPATIENT)
Dept: NEUROLOGY | Facility: CLINIC | Age: 77
End: 2022-11-29
Payer: MEDICARE

## 2022-11-29 NOTE — TELEPHONE ENCOUNTER
Spoke with the pt, appt scheduled 12/1/23 at 1020.  Informed she will need a follow up appt for future refills.  She verbalized understanding.

## 2022-12-06 ENCOUNTER — TELEPHONE (OUTPATIENT)
Dept: PHYSICAL MEDICINE AND REHAB | Facility: CLINIC | Age: 77
End: 2022-12-06
Payer: MEDICARE

## 2022-12-06 NOTE — TELEPHONE ENCOUNTER
----- Message from Batsheva Verma sent at 12/6/2022  4:23 PM CST -----  Who Called: Patient    What is the reqeust in detail: Requesting call back to discuss patient's neck stiffness. Please advise.     Can the clinic reply by [facility]? No    Best Call Back Number: [phone] if no answer please try [phone]    Additional Information:

## 2022-12-06 NOTE — TELEPHONE ENCOUNTER
Returned call to patient c/o neck pain with difficulty sleeping. Scheduled ov 12/7 at 8 am, verbalizes understanding.

## 2022-12-07 ENCOUNTER — OFFICE VISIT (OUTPATIENT)
Dept: PHYSICAL MEDICINE AND REHAB | Facility: CLINIC | Age: 77
End: 2022-12-07
Payer: MEDICARE

## 2022-12-07 VITALS
BODY MASS INDEX: 24.91 KG/M2 | HEART RATE: 75 BPM | DIASTOLIC BLOOD PRESSURE: 72 MMHG | HEIGHT: 66 IN | WEIGHT: 155 LBS | SYSTOLIC BLOOD PRESSURE: 142 MMHG

## 2022-12-07 DIAGNOSIS — M79.10 MYALGIA: Primary | ICD-10-CM

## 2022-12-07 PROCEDURE — 99213 OFFICE O/P EST LOW 20 MIN: CPT | Mod: S$GLB,,, | Performed by: PHYSICAL MEDICINE & REHABILITATION

## 2022-12-07 PROCEDURE — 1159F MED LIST DOCD IN RCRD: CPT | Mod: CPTII,S$GLB,, | Performed by: PHYSICAL MEDICINE & REHABILITATION

## 2022-12-07 PROCEDURE — 1101F PT FALLS ASSESS-DOCD LE1/YR: CPT | Mod: CPTII,S$GLB,, | Performed by: PHYSICAL MEDICINE & REHABILITATION

## 2022-12-07 PROCEDURE — 99213 PR OFFICE/OUTPT VISIT, EST, LEVL III, 20-29 MIN: ICD-10-PCS | Mod: S$GLB,,, | Performed by: PHYSICAL MEDICINE & REHABILITATION

## 2022-12-07 PROCEDURE — 1101F PR PT FALLS ASSESS DOC 0-1 FALLS W/OUT INJ PAST YR: ICD-10-PCS | Mod: CPTII,S$GLB,, | Performed by: PHYSICAL MEDICINE & REHABILITATION

## 2022-12-07 PROCEDURE — 3078F DIAST BP <80 MM HG: CPT | Mod: CPTII,S$GLB,, | Performed by: PHYSICAL MEDICINE & REHABILITATION

## 2022-12-07 PROCEDURE — 3288F PR FALLS RISK ASSESSMENT DOCUMENTED: ICD-10-PCS | Mod: CPTII,S$GLB,, | Performed by: PHYSICAL MEDICINE & REHABILITATION

## 2022-12-07 PROCEDURE — 3077F PR MOST RECENT SYSTOLIC BLOOD PRESSURE >= 140 MM HG: ICD-10-PCS | Mod: CPTII,S$GLB,, | Performed by: PHYSICAL MEDICINE & REHABILITATION

## 2022-12-07 PROCEDURE — 1159F PR MEDICATION LIST DOCUMENTED IN MEDICAL RECORD: ICD-10-PCS | Mod: CPTII,S$GLB,, | Performed by: PHYSICAL MEDICINE & REHABILITATION

## 2022-12-07 PROCEDURE — 1125F AMNT PAIN NOTED PAIN PRSNT: CPT | Mod: CPTII,S$GLB,, | Performed by: PHYSICAL MEDICINE & REHABILITATION

## 2022-12-07 PROCEDURE — 1125F PR PAIN SEVERITY QUANTIFIED, PAIN PRESENT: ICD-10-PCS | Mod: CPTII,S$GLB,, | Performed by: PHYSICAL MEDICINE & REHABILITATION

## 2022-12-07 PROCEDURE — 3288F FALL RISK ASSESSMENT DOCD: CPT | Mod: CPTII,S$GLB,, | Performed by: PHYSICAL MEDICINE & REHABILITATION

## 2022-12-07 PROCEDURE — 3078F PR MOST RECENT DIASTOLIC BLOOD PRESSURE < 80 MM HG: ICD-10-PCS | Mod: CPTII,S$GLB,, | Performed by: PHYSICAL MEDICINE & REHABILITATION

## 2022-12-07 PROCEDURE — 3077F SYST BP >= 140 MM HG: CPT | Mod: CPTII,S$GLB,, | Performed by: PHYSICAL MEDICINE & REHABILITATION

## 2022-12-07 PROCEDURE — 99999 PR PBB SHADOW E&M-EST. PATIENT-LVL IV: ICD-10-PCS | Mod: PBBFAC,,, | Performed by: PHYSICAL MEDICINE & REHABILITATION

## 2022-12-07 PROCEDURE — 99999 PR PBB SHADOW E&M-EST. PATIENT-LVL IV: CPT | Mod: PBBFAC,,, | Performed by: PHYSICAL MEDICINE & REHABILITATION

## 2022-12-07 RX ORDER — DICLOFENAC SODIUM AND MISOPROSTOL 50; 200 MG/1; UG/1
1 TABLET, DELAYED RELEASE ORAL 2 TIMES DAILY
Qty: 90 TABLET | Refills: 3 | Status: SHIPPED | OUTPATIENT
Start: 2022-12-07 | End: 2023-01-25

## 2022-12-07 RX ORDER — DULOXETIN HYDROCHLORIDE 30 MG/1
30 CAPSULE, DELAYED RELEASE ORAL DAILY
Qty: 30 CAPSULE | Refills: 11 | Status: SHIPPED | OUTPATIENT
Start: 2022-12-07 | End: 2023-01-25

## 2022-12-07 NOTE — PROGRESS NOTES
HPI:  Patient is a 77 y.o. year old female w. Surgical hypothyroidism. She is complaining of pain all over since having her surgery. Her TSH is still not under control. She is having difficulty swallowing.  She states she stopped taking cymbalta. She does not recall why or when    Past Medical History:   Diagnosis Date    Adenomatous polyp of colon 3/26/2012    Allergy     Anxiety     Cancer     Cataract     Colon polyp     Degenerative arthritis of knee 04/03/2012    Diverticulosis     Encounter for blood transfusion     GERD (gastroesophageal reflux disease)     History of thyroid cancer 2021    Hyperlipidemia     Hypertension     Lateral meniscal tear 5/20/2013    Multinodular goiter 03/26/2012    Myopathy, unspecified 01/18/2010    Tuberculosis      Past Surgical History:   Procedure Laterality Date    BREAST BIOPSY Left 2015    benign    CHOLECYSTECTOMY      COLONOSCOPY  12/2/15    Dr. Britton, multiple polyps, recheck five years-three years if more than 2 polyps are adenomatous    COLONOSCOPY N/A 12/2/2015    COLONOSCOPY N/A 3/20/2019    Procedure: COLONOSCOPY;  Surgeon: Jose Britton MD;  Location: [facility];  Service: Endoscopy;  Laterality: N/A;    COLONOSCOPY N/A 5/20/2020    Dr. Britton; internal hemorrhoids; diverticulosis; polyps removed; repeat in 3 years    CYSTOSCOPY N/A 8/26/2020    Procedure: CYSTOSCOPY;  Surgeon: Charlotte Walters Jr., MD;  Location: [facility];  Service: Urology;  Laterality: N/A;    DISSECTION OF NECK Left 9/13/2021    Procedure: DISSECTION, NECK;  Surgeon: Ryan Torres MD;  Location: [facility] 2ND FLR;  Service: ENT;  Laterality: Left;    ESOPHAGEAL MANOMETRY WITH MEASUREMENT OF IMPEDANCE N/A 8/22/2022    Procedure: MANOMETRY, ESOPHAGUS, WITH IMPEDANCE MEASUREMENT;  Surgeon: Pantera Mcguire MD;  Location: [facility] (4TH FLR);  Service: Endoscopy;  Laterality: N/A;  8/4 fully vaccinated; instructions mailed-st    ESOPHAGOGASTRODUODENOSCOPY N/A 5/20/2020    Dr. Britton; small hiatal  hernia; gastritis; 8 gastric polyps removed    ESOPHAGOGASTRODUODENOSCOPY N/A 4/1/2022    Procedure: EGD (ESOPHAGOGASTRODUODENOSCOPY);  Surgeon: Jose Britton MD;  Location: [facility];  Service: Endoscopy;  Laterality: N/A;    FOOT SURGERY      HYSTERECTOMY      TVH/BSO > 20 years    INTRALUMINAL GASTROINTESTINAL TRACT IMAGING VIA CAPSULE N/A 6/4/2020    Procedure: IMAGING PROCEDURE, GI TRACT, INTRALUMINAL, VIA CAPSULE;  Surgeon: Jose Britton MD;  Location: [facility] ENDO;  Service: Endoscopy;  Laterality: N/A;    KNEE SURGERY  06/06/2013    right knee tear Dr Iverson     LAPAROSCOPIC CHOLECYSTECTOMY N/A 9/17/2020    Procedure: CHOLECYSTECTOMY, LAPAROSCOPIC;  Surgeon: Forrest Veronica MD;  Location: [facility];  Service: General;  Laterality: N/A;    LUNG LOBECTOMY  1966    right middle lobectomy, due to Tb    OOPHORECTOMY      SHOULDER SURGERY      francis     THYROIDECTOMY Bilateral 5/19/2021    Procedure: THYROIDECTOMY;  Surgeon: Jamia Amezquita MD;  Location: [facility] OR [address] [place] [address];  Service: General;  Laterality: Bilateral;    THYROIDECTOMY Bilateral 9/13/2021    Procedure: THYROIDECTOMY WITH INTRA-OP PTH;  Surgeon: Ryan Torres MD;  Location: [facility] OR 2ND FLR;  Service: ENT;  Laterality: Bilateral;  NIM tube  Intraop PTH     Family History   Problem Relation Age of Onset    Colon cancer Other     Cancer Mother     Hypertension Mother     Hyperlipidemia Mother     Cancer Brother     Hypertension Father     Emphysema Father     Diabetes Son     Hypertension Son     Alcohol abuse Son     Diabetes Maternal Aunt     Alzheimer's disease Maternal Uncle     Cancer Maternal Grandmother     No Known Problems Daughter     Dementia Sister     Alzheimer's disease Sister     Breast cancer Sister 60    Obesity Paternal Uncle     Prostate cancer Other     Cancer Brother     Melanoma Neg Hx     Psoriasis Neg Hx     Lupus Neg Hx     Eczema Neg Hx     Amblyopia Neg Hx     Blindness Neg Hx     Cataracts Neg Hx     Glaucoma Neg Hx      Macular degeneration Neg Hx     Retinal detachment Neg Hx     Strabismus Neg Hx     Stroke Neg Hx     Thyroid cancer Neg Hx     Colon polyps Neg Hx     Ulcerative colitis Neg Hx     Stomach cancer Neg Hx     Rectal cancer Neg Hx      Social History     Socioeconomic History    Marital status:    Tobacco Use    Smoking status: Never    Smokeless tobacco: Never   Substance and Sexual Activity    Alcohol use: Not Currently     Comment: stopped 2019    Drug use: No    Sexual activity: Not Currently       Review of patient's allergies indicates:  No Known Allergies    Current Outpatient Medications:     amLODIPine (NORVASC) 2.5 MG tablet, Take 1 tablet by mouth once daily, Disp: 90 tablet, Rfl: 0    artificial tears (ISOPTO TEARS) 0.5 % ophthalmic solution, 1 drop as needed., Disp: , Rfl:     blood sugar diagnostic (BLOOD GLUCOSE TEST) Strp, True Metrix glucose strips check once daily, Disp: 100 each, Rfl: 3    conjugated estrogens (PREMARIN) vaginal cream, Place 0.5 g vaginally once daily AND 0.5 g twice a week., Disp: 30 g, Rfl: 7    coQ10, ubiquinol, 100 mg Cap, Take 100 mg by mouth once daily., Disp: , Rfl:     doxepin (SINEQUAN) 25 MG capsule, Take 25 mg by mouth every evening., Disp: , Rfl:     DULoxetine (CYMBALTA) 30 MG capsule, Take 1 capsule (30 mg total) by mouth once daily., Disp: 30 capsule, Rfl: 11    ergocalciferol (ERGOCALCIFEROL) 50,000 unit Cap, Take 1 capsule (50,000 Units total) by mouth every 30 days., Disp: 3 capsule, Rfl: 3    gabapentin (NEURONTIN) 300 MG capsule, Take 300 mg by mouth 3 (three) times daily., Disp: , Rfl:     levothyroxine (SYNTHROID) 75 MCG tablet, Take 1 tablet (75 mcg total) by mouth before breakfast., Disp: 90 tablet, Rfl: 3    losartan (COZAAR) 100 MG tablet, Take 1 tablet by mouth once daily, Disp: 90 tablet, Rfl: 1    rosuvastatin (CRESTOR) 20 MG tablet, Take 0.5 tablets (10 mg total) by mouth every other day., Disp: 90 tablet, Rfl: 0    tobramycin-dexAMETHasone  0.3-0.1% (TOBRADEX) 0.3-0.1 % DrpS, Place 1 drop into both eyes 4 (four) times daily., Disp: 5 mL, Rfl: 0    baclofen (LIORESAL) 10 MG tablet, Take 1 tablet (10 mg total) by mouth 3 (three) times daily. Start w. Half a tab first 7 days (Patient not taking: Reported on 9/8/2022), Disp: 90 tablet, Rfl: 6    blood-glucose meter kit, True Metrix glucometer check glucose once daily, Disp: 1 each, Rfl: 0    calcium carbonate (TUMS) 200 mg calcium (500 mg) chewable tablet, Chew and swallow 2 tablets (1,000 mg total) by mouth 3 (three) times daily. for 14 days, Disp: 100 tablet, Rfl: 0    diclofenac-misoprostol  mg-mcg (ARTHROTEC 50)  mg-mcg per tablet, Take 1 tablet by mouth 2 (two) times daily., Disp: 90 tablet, Rfl: 3    DULoxetine (CYMBALTA) 30 MG capsule, Take 1 capsule (30 mg total) by mouth once daily., Disp: 30 capsule, Rfl: 11    Review of Systems  No nausea, vomiting, fevers, Chills , contipation, diarrhea or sweats       Physical Exam:      Vitals:    12/07/22 0756   BP: (!) 142/72   Pulse: 75     alert and oriented ×4 follows commands answers all questions appropriately,affect wnl  Manual muscle test 5 out of 5 sensation to light touch grossly intact  + tenderness   +fibromyalgia pts >11/18  Antalgic gait  -slR modified  No C/C/E     Assessment:  Myalgias due to hypothyroidism +/- fibromyalgia- as they commonly occur together    Plan:  Restart cymbalta  Arthrotec trial  Follow  up w. Dr. Becker as I will be departing [facility]

## 2022-12-08 ENCOUNTER — TELEPHONE (OUTPATIENT)
Dept: OTOLARYNGOLOGY | Facility: CLINIC | Age: 77
End: 2022-12-08
Payer: MEDICARE

## 2022-12-08 ENCOUNTER — OFFICE VISIT (OUTPATIENT)
Dept: ORTHOPEDICS | Facility: CLINIC | Age: 77
End: 2022-12-08
Payer: MEDICARE

## 2022-12-08 VITALS — WEIGHT: 155 LBS | RESPIRATION RATE: 18 BRPM | BODY MASS INDEX: 24.91 KG/M2 | HEIGHT: 66 IN

## 2022-12-08 DIAGNOSIS — S46.812A TRAPEZIUS MUSCLE STRAIN, LEFT, INITIAL ENCOUNTER: Primary | ICD-10-CM

## 2022-12-08 DIAGNOSIS — M17.10 ARTHRITIS OF KNEE: ICD-10-CM

## 2022-12-08 DIAGNOSIS — G57.31 PERONEAL NEURITIS, RIGHT: ICD-10-CM

## 2022-12-08 PROCEDURE — 1101F PR PT FALLS ASSESS DOC 0-1 FALLS W/OUT INJ PAST YR: ICD-10-PCS | Mod: CPTII,S$GLB,, | Performed by: ORTHOPAEDIC SURGERY

## 2022-12-08 PROCEDURE — 1101F PT FALLS ASSESS-DOCD LE1/YR: CPT | Mod: CPTII,S$GLB,, | Performed by: ORTHOPAEDIC SURGERY

## 2022-12-08 PROCEDURE — 1159F PR MEDICATION LIST DOCUMENTED IN MEDICAL RECORD: ICD-10-PCS | Mod: CPTII,S$GLB,, | Performed by: ORTHOPAEDIC SURGERY

## 2022-12-08 PROCEDURE — 99213 PR OFFICE/OUTPT VISIT, EST, LEVL III, 20-29 MIN: ICD-10-PCS | Mod: S$GLB,,, | Performed by: ORTHOPAEDIC SURGERY

## 2022-12-08 PROCEDURE — 1125F AMNT PAIN NOTED PAIN PRSNT: CPT | Mod: CPTII,S$GLB,, | Performed by: ORTHOPAEDIC SURGERY

## 2022-12-08 PROCEDURE — 1159F MED LIST DOCD IN RCRD: CPT | Mod: CPTII,S$GLB,, | Performed by: ORTHOPAEDIC SURGERY

## 2022-12-08 PROCEDURE — 3288F FALL RISK ASSESSMENT DOCD: CPT | Mod: CPTII,S$GLB,, | Performed by: ORTHOPAEDIC SURGERY

## 2022-12-08 PROCEDURE — 3288F PR FALLS RISK ASSESSMENT DOCUMENTED: ICD-10-PCS | Mod: CPTII,S$GLB,, | Performed by: ORTHOPAEDIC SURGERY

## 2022-12-08 PROCEDURE — 1160F RVW MEDS BY RX/DR IN RCRD: CPT | Mod: CPTII,S$GLB,, | Performed by: ORTHOPAEDIC SURGERY

## 2022-12-08 PROCEDURE — 1125F PR PAIN SEVERITY QUANTIFIED, PAIN PRESENT: ICD-10-PCS | Mod: CPTII,S$GLB,, | Performed by: ORTHOPAEDIC SURGERY

## 2022-12-08 PROCEDURE — 1160F PR REVIEW ALL MEDS BY PRESCRIBER/CLIN PHARMACIST DOCUMENTED: ICD-10-PCS | Mod: CPTII,S$GLB,, | Performed by: ORTHOPAEDIC SURGERY

## 2022-12-08 PROCEDURE — 99999 PR PBB SHADOW E&M-EST. PATIENT-LVL III: CPT | Mod: PBBFAC,,, | Performed by: ORTHOPAEDIC SURGERY

## 2022-12-08 PROCEDURE — 99999 PR PBB SHADOW E&M-EST. PATIENT-LVL III: ICD-10-PCS | Mod: PBBFAC,,, | Performed by: ORTHOPAEDIC SURGERY

## 2022-12-08 PROCEDURE — 99213 OFFICE O/P EST LOW 20 MIN: CPT | Mod: S$GLB,,, | Performed by: ORTHOPAEDIC SURGERY

## 2022-12-08 RX ORDER — CYCLOBENZAPRINE HCL 10 MG
10 TABLET ORAL 3 TIMES DAILY PRN
Qty: 30 TABLET | Refills: 0 | Status: SHIPPED | OUTPATIENT
Start: 2022-12-08 | End: 2022-12-18

## 2022-12-08 RX ORDER — METHYLPREDNISOLONE 4 MG/1
TABLET ORAL
Qty: 21 EACH | Refills: 0 | Status: SHIPPED | OUTPATIENT
Start: 2022-12-08 | End: 2022-12-29

## 2022-12-08 NOTE — TELEPHONE ENCOUNTER
----- Message from Natalia Fernando sent at 12/7/2022  4:08 PM CST -----  Contact: self @ [phone] or [phone]  Pt says she is having a problem with her neck.  She says she is having a lot of pain at night and is sometimes having issues swallowing.  She says she also has a burning in her shoulder.  She is currently scheduled to f/u on 12-23-22 but would like to be seen asap.  There is nothing available anytime sooner at this time and she is already on the wait list.  Pls call.

## 2022-12-08 NOTE — PROGRESS NOTES
Past Medical History:   Diagnosis Date    Adenomatous polyp of colon 3/26/2012    Allergy     Anxiety     Cancer     Cataract     Colon polyp     Degenerative arthritis of knee 04/03/2012    Diverticulosis     Encounter for blood transfusion     GERD (gastroesophageal reflux disease)     History of thyroid cancer 2021    Hyperlipidemia     Hypertension     Lateral meniscal tear 5/20/2013    Multinodular goiter 03/26/2012    Myopathy, unspecified 01/18/2010    Tuberculosis        Past Surgical History:   Procedure Laterality Date    BREAST BIOPSY Left 2015    benign    CHOLECYSTECTOMY      COLONOSCOPY  12/2/15    Dr. Britton, multiple polyps, recheck five years-three years if more than 2 polyps are adenomatous    COLONOSCOPY N/A 12/2/2015    COLONOSCOPY N/A 3/20/2019    Procedure: COLONOSCOPY;  Surgeon: Jose Britton MD;  Location: [facility];  Service: Endoscopy;  Laterality: N/A;    COLONOSCOPY N/A 5/20/2020    Dr. Britton; internal hemorrhoids; diverticulosis; polyps removed; repeat in 3 years    CYSTOSCOPY N/A 8/26/2020    Procedure: CYSTOSCOPY;  Surgeon: Charlotte Walters Jr., MD;  Location: [facility];  Service: Urology;  Laterality: N/A;    DISSECTION OF NECK Left 9/13/2021    Procedure: DISSECTION, NECK;  Surgeon: Ryan Torres MD;  Location: [facility] OR 2ND FLR;  Service: ENT;  Laterality: Left;    ESOPHAGEAL MANOMETRY WITH MEASUREMENT OF IMPEDANCE N/A 8/22/2022    Procedure: MANOMETRY, ESOPHAGUS, WITH IMPEDANCE MEASUREMENT;  Surgeon: Pantera Mcguire MD;  Location: [facility] (4TH FLR);  Service: Endoscopy;  Laterality: N/A;  8/4 fully vaccinated; instructions mailed-st    ESOPHAGOGASTRODUODENOSCOPY N/A 5/20/2020    Dr. Britton; small hiatal hernia; gastritis; 8 gastric polyps removed    ESOPHAGOGASTRODUODENOSCOPY N/A 4/1/2022    Procedure: EGD (ESOPHAGOGASTRODUODENOSCOPY);  Surgeon: Jose Britton MD;  Location: [facility];  Service: Endoscopy;  Laterality: N/A;    FOOT SURGERY      HYSTERECTOMY      TVH/BSO > 20  years    INTRALUMINAL GASTROINTESTINAL TRACT IMAGING VIA CAPSULE N/A 6/4/2020    Procedure: IMAGING PROCEDURE, GI TRACT, INTRALUMINAL, VIA CAPSULE;  Surgeon: Jose Britton MD;  Location: [facility] ENDO;  Service: Endoscopy;  Laterality: N/A;    KNEE SURGERY  06/06/2013    right knee tear Dr Iverson     LAPAROSCOPIC CHOLECYSTECTOMY N/A 9/17/2020    Procedure: CHOLECYSTECTOMY, LAPAROSCOPIC;  Surgeon: Forrest Veronica MD;  Location: [facility] OR;  Service: General;  Laterality: N/A;    LUNG LOBECTOMY  1966    right middle lobectomy, due to Tb    OOPHORECTOMY      SHOULDER SURGERY      francis     THYROIDECTOMY Bilateral 5/19/2021    Procedure: THYROIDECTOMY;  Surgeon: Jamia Amezquita MD;  [facility];  Service: General;  Laterality: Bilateral;    THYROIDECTOMY Bilateral 9/13/2021    Procedure: THYROIDECTOMY WITH INTRA-OP PTH;  Surgeon: Ryan Torres MD;  [facility];  Service: ENT;  Laterality: Bilateral;  NIM tube  Intraop PTH       Current Outpatient Medications   Medication Sig    amLODIPine (NORVASC) 2.5 MG tablet Take 1 tablet by mouth once daily    artificial tears (ISOPTO TEARS) 0.5 % ophthalmic solution 1 drop as needed.    blood sugar diagnostic (BLOOD GLUCOSE TEST) Strp True Metrix glucose strips check once daily    conjugated estrogens (PREMARIN) vaginal cream Place 0.5 g vaginally once daily AND 0.5 g twice a week.    coQ10, ubiquinol, 100 mg Cap Take 100 mg by mouth once daily.    diclofenac-misoprostol  mg-mcg (ARTHROTEC 50)  mg-mcg per tablet Take 1 tablet by mouth 2 (two) times daily.    doxepin (SINEQUAN) 25 MG capsule Take 25 mg by mouth every evening.    DULoxetine (CYMBALTA) 30 MG capsule Take 1 capsule (30 mg total) by mouth once daily.    DULoxetine (CYMBALTA) 30 MG capsule Take 1 capsule (30 mg total) by mouth once daily.    ergocalciferol (ERGOCALCIFEROL) 50,000 unit Cap Take 1 capsule (50,000 Units total) by mouth every 30 days.    gabapentin (NEURONTIN)  300 MG capsule Take 300 mg by mouth 3 (three) times daily.    levothyroxine (SYNTHROID) 75 MCG tablet Take 1 tablet (75 mcg total) by mouth before breakfast.    losartan (COZAAR) 100 MG tablet Take 1 tablet by mouth once daily    rosuvastatin (CRESTOR) 20 MG tablet Take 0.5 tablets (10 mg total) by mouth every other day.    tobramycin-dexAMETHasone 0.3-0.1% (TOBRADEX) 0.3-0.1 % DrpS Place 1 drop into both eyes 4 (four) times daily.    baclofen (LIORESAL) 10 MG tablet Take 1 tablet (10 mg total) by mouth 3 (three) times daily. Start w. Half a tab first 7 days (Patient not taking: Reported on 9/8/2022)    blood-glucose meter kit True Metrix glucometer check glucose once daily    calcium carbonate (TUMS) 200 mg calcium (500 mg) chewable tablet Chew and swallow 2 tablets (1,000 mg total) by mouth 3 (three) times daily. for 14 days     No current facility-administered medications for this visit.       Review of patient's allergies indicates:   Allergen Reactions    No known drug allergies        Family History   Problem Relation Age of Onset    Colon cancer Other     Cancer Mother     Hypertension Mother     Hyperlipidemia Mother     Cancer Brother     Hypertension Father     Emphysema Father     Diabetes Son     Hypertension Son     Alcohol abuse Son     Diabetes Maternal Aunt     Alzheimer's disease Maternal Uncle     Cancer Maternal Grandmother     No Known Problems Daughter     Dementia Sister     Alzheimer's disease Sister     Breast cancer Sister 60    Obesity Paternal Uncle     Prostate cancer Other     Cancer Brother     Melanoma Neg Hx     Psoriasis Neg Hx     Lupus Neg Hx     Eczema Neg Hx     Amblyopia Neg Hx     Blindness Neg Hx     Cataracts Neg Hx     Glaucoma Neg Hx     Macular degeneration Neg Hx     Retinal detachment Neg Hx     Strabismus Neg Hx     Stroke Neg Hx     Thyroid cancer Neg Hx     Colon polyps Neg Hx     Ulcerative colitis Neg Hx     Stomach cancer Neg Hx     Rectal cancer Neg Hx         Social History     Socioeconomic History    Marital status:    Tobacco Use    Smoking status: Never    Smokeless tobacco: Never   Substance and Sexual Activity    Alcohol use: Not Currently     Comment: stopped 2019    Drug use: No    Sexual activity: Not Currently       Chief Complaint:   Chief Complaint   Patient presents with    Follow-up     follow up right knee and left shoulder        History of present illness: This is a 77 year-old female who has a history of left shoulder pain.  It started after a thyroidectomy on September 13th.  Patient has a lot of pain in the trapezius and neck area.    She finished the physical therapy and is now just doing it on her own.  Complaining today of right leg pain.  It is mainly in the lateral calf.  Goes up and down the leg from the hip all the way down to the toes.  She saw Dr. Montgomery yesterday who recommended some Cymbalta and Arthrotec .pain is 7/10.    Review of Systems:      Musculoskeletal:  See HPI        Physical Examination:    Vital Signs:    Vitals:    12/08/22 0916   Resp: 18       Body mass index is 25.02 kg/m².    This a well-developed, well nourished patient in no acute distress.  They are alert and oriented and cooperative to examination.  Pt. walks without an antalgic gait.      Examination of the left shoulder shows no rashes or erythema.  There is much improved scapular posture.  Pain and tightness in the trapezius.  There are no masses, ecchymosis, or atrophy. The patient has full range of motion in forward flexion, external rotation, and internal rotation to the mid T-spine. The patient has moderately positive impingement signs. - Usk's test. - Speeds test. Nontender to palpation over a.c. joint.Passive range of motion: Forward flexion of 180°, external rotation at 90° of 90°, internal rotation of 50°, and external rotation at 0° of 50°. 2+ radial pulse. Intact axillary, radial, median and ulnar sensation. 4 out of 5 resisted forward  flexion, external rotation, and negative lift off test.    Examination of the right knee shows no rashes or erythema. There are no masses ecchymosis or effusion. Patient has full range of motion from 0-130°. Patient is nontender to palpation over lateral joint line and nontender to palpation over the medial joint line. Patient has a - Lachman exam, - anterior drawer exam, and - posterior drawer exam. - Nicole's exam. Knee is stable to varus and valgus stress. 5 out of 5 motor strength. Palpable distal pulses. Intact light touch sensation. Negative Patellofemoral crepitus            X-rays: X-rays of the left shoulder is reviewed which show some AC arthritis otherwise normal-appearing shoulder x-ray    MRI of the left shoulder:Tendinopathy and partial thickness articular surface tearing of the distal supraspinatus tendon.  AC joint arthrosis.  Mild osteoarthritic changes involving the glenohumeral joint space.     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

## 2022-12-14 ENCOUNTER — TELEPHONE (OUTPATIENT)
Dept: OPHTHALMOLOGY | Facility: CLINIC | Age: 77
End: 2022-12-14
Payer: MEDICARE

## 2022-12-14 NOTE — TELEPHONE ENCOUNTER
Spoke to pt and scheduled urgent appt in [facility]    ----- Message from Viktoria Ellis sent at 12/14/2022  2:07 PM CST -----  Regarding: same day appt  Name of Who is Calling: TODD RODRIGUEZ [[id number]]      What is the request in detail: Pt has an appt for 1/13/2023. She needs to be seen before that. Preferably today or tomorrow. Her eye is getting worse. It's red, has pain and she can barely open.       Can the clinic reply by [facility]: no      What Number to Call Back if not in [facility]: [phone] or Telephone Information:  Mobile          [phone]

## 2022-12-15 ENCOUNTER — PES CALL (OUTPATIENT)
Dept: ADMINISTRATIVE | Facility: CLINIC | Age: 77
End: 2022-12-15
Payer: MEDICARE

## 2022-12-20 ENCOUNTER — LAB VISIT (OUTPATIENT)
Dept: LAB | Facility: HOSPITAL | Age: 77
End: 2022-12-20
Attending: INTERNAL MEDICINE
Payer: MEDICARE

## 2022-12-20 DIAGNOSIS — E89.0 POST-SURGICAL HYPOTHYROIDISM: ICD-10-CM

## 2022-12-20 DIAGNOSIS — E83.51 HYPOCALCEMIA: ICD-10-CM

## 2022-12-20 DIAGNOSIS — M85.88 OSTEOPENIA OF LUMBAR SPINE: ICD-10-CM

## 2022-12-20 LAB
ALBUMIN SERPL BCP-MCNC: 3.6 G/DL (ref 3.5–5.2)
ALP SERPL-CCNC: 48 U/L (ref 55–135)
ALT SERPL W/O P-5'-P-CCNC: 24 U/L (ref 10–44)
ANION GAP SERPL CALC-SCNC: 10 MMOL/L (ref 8–16)
AST SERPL-CCNC: 17 U/L (ref 10–40)
BILIRUB SERPL-MCNC: 0.6 MG/DL (ref 0.1–1)
BUN SERPL-MCNC: 19 MG/DL (ref 8–23)
CA-I BLDV-SCNC: 1.01 MMOL/L (ref 1.06–1.42)
CALCIUM SERPL-MCNC: 7.9 MG/DL (ref 8.7–10.5)
CHLORIDE SERPL-SCNC: 101 MMOL/L (ref 95–110)
CO2 SERPL-SCNC: 29 MMOL/L (ref 23–29)
CREAT SERPL-MCNC: 1.2 MG/DL (ref 0.5–1.4)
EST. GFR  (NO RACE VARIABLE): 46 ML/MIN/1.73 M^2
GLUCOSE SERPL-MCNC: 100 MG/DL (ref 70–110)
MAGNESIUM SERPL-MCNC: 1.8 MG/DL (ref 1.6–2.6)
POTASSIUM SERPL-SCNC: 4 MMOL/L (ref 3.5–5.1)
PROT SERPL-MCNC: 7.1 G/DL (ref 6–8.4)
PTH-INTACT SERPL-MCNC: 50.1 PG/ML (ref 9–77)
SODIUM SERPL-SCNC: 140 MMOL/L (ref 136–145)
T4 FREE SERPL-MCNC: 0.93 NG/DL (ref 0.71–1.51)
TSH SERPL DL<=0.005 MIU/L-ACNC: 3.26 UIU/ML (ref 0.4–4)

## 2022-12-20 PROCEDURE — 84439 ASSAY OF FREE THYROXINE: CPT | Performed by: INTERNAL MEDICINE

## 2022-12-20 PROCEDURE — 83970 ASSAY OF PARATHORMONE: CPT | Performed by: INTERNAL MEDICINE

## 2022-12-20 PROCEDURE — 80053 COMPREHEN METABOLIC PANEL: CPT | Performed by: INTERNAL MEDICINE

## 2022-12-20 PROCEDURE — 84443 ASSAY THYROID STIM HORMONE: CPT | Performed by: INTERNAL MEDICINE

## 2022-12-20 PROCEDURE — 36415 COLL VENOUS BLD VENIPUNCTURE: CPT | Mod: PO | Performed by: INTERNAL MEDICINE

## 2022-12-20 PROCEDURE — 83735 ASSAY OF MAGNESIUM: CPT | Performed by: INTERNAL MEDICINE

## 2022-12-20 PROCEDURE — 82330 ASSAY OF CALCIUM: CPT | Performed by: INTERNAL MEDICINE

## 2022-12-20 NOTE — PROGRESS NOTES
\[facility]\"" Gastroenterology    CC: anemia     HPI 74 y.o. female presents for evaluation of a new diagnosis of very mild iron deficiency anemia which is now resolved. She denies any previous history of PRBC transfusion or associated overt bleeding. Evaluation of her symptoms has included EGD, colonoscopy and VCE. She had several gastric polyps removed from her stomach along with gastritis.     Past Medical History  Past Medical History:   Diagnosis Date    Allergy     Anxiety     Cataract     Colon polyp     Degenerative arthritis of knee 4/3/2012    Diverticulosis     GERD (gastroesophageal reflux disease)     Hyperlipidemia     Hypertension     Multinodular goiter 3/26/2012    Myopathy, unspecified 1/18/2010    New onset type 2 diabetes mellitus 3/24/2020    New onset type 2 diabetes mellitus 3/24/2020    Tuberculosis      Review of Systems  General ROS: negative for chills, fever or weight loss  Cardiovascular ROS: no chest pain or dyspnea on exertion  Gastrointestinal ROS: no abdominal pain; positive nausea     Physical Examination:   Alert and responsive to interview. Additional physical exam is limited due to telemedicine     Lab Results   Component Value Date    WBC 6.95 06/20/2020    HGB 12.0 06/20/2020    HCT 38.0 06/20/2020    MCV 92 06/20/2020     06/20/2020       Video capsule reviewed independently. There is a small,questionable angioectasia in the proximal-mid jejunum as well as a very small amount of blood in this segment (few drops of marroon blood). If this patient has NEMO, a bleeding angioectasia in the stomach, duodenum or jejunum is likely.     Assessment:   Very mild anemia which is now resolved. This patient likely experiences mild blood loss from an angioectasia which is not currently active enough to produce symptoms.     Plan:  Oral multivitamin with iron once daily   Monitor CBC every ~3-6 months   Plan for balloon enteroscopy only if anemia recurs and is significant as  enteroscopy with ablation of angioectasias is generally not curative   Follow up with Dr. Britton for recurrent nausea       Albin Unger MD   [address], [address]   [place] [address] [phone]       Calcipotriene Pregnancy And Lactation Text: This medication has not been proven safe during pregnancy. It is unknown if this medication is excreted in breast milk.

## 2022-12-23 ENCOUNTER — TELEPHONE (OUTPATIENT)
Dept: FAMILY MEDICINE | Facility: CLINIC | Age: 77
End: 2022-12-23
Payer: MEDICARE

## 2022-12-23 DIAGNOSIS — C73 MALIGNANT NEOPLASM OF THYROID GLAND: ICD-10-CM

## 2022-12-23 DIAGNOSIS — E83.51 HYPOCALCEMIA: ICD-10-CM

## 2022-12-23 DIAGNOSIS — N18.31 STAGE 3A CHRONIC KIDNEY DISEASE: Primary | ICD-10-CM

## 2022-12-23 DIAGNOSIS — E89.0 POST-SURGICAL HYPOTHYROIDISM: Primary | ICD-10-CM

## 2022-12-23 NOTE — TELEPHONE ENCOUNTER
Patient states she received a call from Dr. Landrum's office regarding lab results. And was advised for patient to follow up with Dr. Myers regarding kidney levels worsening. Please advise. Thank you.

## 2022-12-23 NOTE — TELEPHONE ENCOUNTER
----- Message from Jenn King sent at 12/23/2022  4:48 PM CST -----  Contact: PT  Type:  Needs Medical Advice    Who Called: PT  Symptoms (please be specific): KIDNEY LEVEL SLIGHTLY LOW  AND LOWER RIGHT CORNER OF HER STOMACH AND BUTT CHEEK IS BOTHERING HER   How long has patient had these symptoms: A FEW WEEKS   Pharmacy name and phone #:   [facility]  Would the patient rather a call back or a response via [facility]? CALL  Best Call Back Number: [phone] / [phone]  Additional Information: THANK YOU

## 2022-12-29 ENCOUNTER — TELEPHONE (OUTPATIENT)
Dept: FAMILY MEDICINE | Facility: CLINIC | Age: 77
End: 2022-12-29
Payer: MEDICARE

## 2022-12-29 NOTE — TELEPHONE ENCOUNTER
Get off of the Arthrotec/diclofenac that Dr. Alvarez gave her immediately.  Do not take any anti inflammatories including over-the-counter Advil/ibuprofen or Aleve/naproxen.  Push fluids and recheck a BMP in two weeks.  Order is in

## 2022-12-29 NOTE — TELEPHONE ENCOUNTER
----- Message from Janny Hoff sent at 12/29/2022  7:53 AM CST -----  Type: Needs Medical Advice  Who Called:  pt  Symptoms (please be specific):  pt said she need to know if the dr will call her in something for a sinus infection--her eyes are burning and red and watering--said she would like to speak to the nurse--said she have tried OTC and they are not working--please call and advise  Best Call Back Number:[phone]    Additional Information: thank you

## 2022-12-29 NOTE — TELEPHONE ENCOUNTER
Called the patient and gave her detailed instructions on medication to stop, also scheduled her for labs in 2wks

## 2022-12-29 NOTE — TELEPHONE ENCOUNTER
Patient complained of mucus in her throat and eyes bloodshot- advised to go to urgent care due to no appts here- she made appt here next week 1/4/23.

## 2023-01-03 ENCOUNTER — TELEPHONE (OUTPATIENT)
Dept: OPHTHALMOLOGY | Facility: CLINIC | Age: 78
End: 2023-01-03
Payer: MEDICARE

## 2023-01-10 ENCOUNTER — OFFICE VISIT (OUTPATIENT)
Dept: OTOLARYNGOLOGY | Facility: CLINIC | Age: 78
End: 2023-01-10
Payer: MEDICARE

## 2023-01-10 VITALS — DIASTOLIC BLOOD PRESSURE: 79 MMHG | HEART RATE: 90 BPM | SYSTOLIC BLOOD PRESSURE: 134 MMHG

## 2023-01-10 DIAGNOSIS — C73 THYROID CANCER: Primary | Chronic | ICD-10-CM

## 2023-01-10 PROCEDURE — 1126F PR PAIN SEVERITY QUANTIFIED, NO PAIN PRESENT: ICD-10-PCS | Mod: CPTII,S$GLB,, | Performed by: OTOLARYNGOLOGY

## 2023-01-10 PROCEDURE — 1159F MED LIST DOCD IN RCRD: CPT | Mod: CPTII,S$GLB,, | Performed by: OTOLARYNGOLOGY

## 2023-01-10 PROCEDURE — 1126F AMNT PAIN NOTED NONE PRSNT: CPT | Mod: CPTII,S$GLB,, | Performed by: OTOLARYNGOLOGY

## 2023-01-10 PROCEDURE — 3078F PR MOST RECENT DIASTOLIC BLOOD PRESSURE < 80 MM HG: ICD-10-PCS | Mod: CPTII,S$GLB,, | Performed by: OTOLARYNGOLOGY

## 2023-01-10 PROCEDURE — 3075F PR MOST RECENT SYSTOLIC BLOOD PRESS GE 130-139MM HG: ICD-10-PCS | Mod: CPTII,S$GLB,, | Performed by: OTOLARYNGOLOGY

## 2023-01-10 PROCEDURE — 1159F PR MEDICATION LIST DOCUMENTED IN MEDICAL RECORD: ICD-10-PCS | Mod: CPTII,S$GLB,, | Performed by: OTOLARYNGOLOGY

## 2023-01-10 PROCEDURE — 3078F DIAST BP <80 MM HG: CPT | Mod: CPTII,S$GLB,, | Performed by: OTOLARYNGOLOGY

## 2023-01-10 PROCEDURE — 99999 PR PBB SHADOW E&M-EST. PATIENT-LVL III: CPT | Mod: PBBFAC,,, | Performed by: OTOLARYNGOLOGY

## 2023-01-10 PROCEDURE — 99214 OFFICE O/P EST MOD 30 MIN: CPT | Mod: S$GLB,,, | Performed by: OTOLARYNGOLOGY

## 2023-01-10 PROCEDURE — 3075F SYST BP GE 130 - 139MM HG: CPT | Mod: CPTII,S$GLB,, | Performed by: OTOLARYNGOLOGY

## 2023-01-10 PROCEDURE — 99214 PR OFFICE/OUTPT VISIT, EST, LEVL IV, 30-39 MIN: ICD-10-PCS | Mod: S$GLB,,, | Performed by: OTOLARYNGOLOGY

## 2023-01-10 PROCEDURE — 3072F LOW RISK FOR RETINOPATHY: CPT | Mod: CPTII,S$GLB,, | Performed by: OTOLARYNGOLOGY

## 2023-01-10 PROCEDURE — 99999 PR PBB SHADOW E&M-EST. PATIENT-LVL III: ICD-10-PCS | Mod: PBBFAC,,, | Performed by: OTOLARYNGOLOGY

## 2023-01-10 PROCEDURE — 3072F PR LOW RISK FOR RETINOPATHY: ICD-10-PCS | Mod: CPTII,S$GLB,, | Performed by: OTOLARYNGOLOGY

## 2023-01-10 NOTE — ASSESSMENT & PLAN NOTE
Overall, she is doing well and has no definitive evidence of recurrent disease. Her ultrasound was reassuring, but her Tg remains detectable, albeit lower. She saw Dr. Landrum in October, with a plan for repeat labs in 6 months. We will follow closely as well, as regional recurrence, such as in the other lateral neck, remains a possibility.     Of note, she has no evidence of thrush on exam. Her lingual papillae are pale, but otherwise normal. There is no exudate. Reassurance was provided.

## 2023-01-10 NOTE — PROGRESS NOTES
[facility]Y [facility]    Subjective:       Patient ID: Erica Masterson is a 77 y.o. female.    Chief Complaint: Follow-up    Follow-up  Associated symptoms include arthralgias, myalgias and neck pain. Pertinent negatives include no abdominal pain, chest pain, chills, congestion, coughing, fatigue, fever, headaches, nausea, numbness, rash, sore throat, vomiting or weakness.   Oncology History:  Oncology History   Thyroid cancer   5/19/2021 Initial Diagnosis    Thyroid cancer     5/19/2021 Surgery    Total thyroidectomy (Dr. Amezquita)     8/10/2021 Cancer Staged    Staging form: Thyroid - Differentiated, AJCC 8th Edition  - Clinical stage from 8/10/2021: Stage III (cT4a, cNX, cM0, Age at diagnosis: >= 55 years)       9/13/2021 Surgery    1) Completion total thyroidectomy with bilateral paratracheal and superior mediastinal dissection  2) Left modified neck dissection of levels II-Vb       9/27/2021 Cancer Staged    Staging form: Thyroid - Differentiated, AJCC 8th Edition  - Pathologic stage from 9/27/2021: Stage III (pT4a, pN1b, cM0, Age at diagnosis: >= 55 years)       12/15/2021 -  Radiation Therapy    Treating physician: Dr. Landrum, Dr. Yepez  Total Dose: 100 mCi COOPER         Erica Masterson is a 77 y.o. female with thyroid carcinoma s/p surgery and revision surgery and now COOPER on 12/15/21. Her post-COOPER scan was clear of metastatic disease, with only expected minimal uptake in the thyroid bed.     She still has a multitude of complaints - she complains of left neck tightness and left neck numbness in the operative bed. Her neck tightness has been a little better of late since she started using a Biofreeze pad at night. She is sleeping better and not having the shortness of breath any more. She sleeps propped up on pillows. Her shoulders do not bother her. She continues to do the PT exercises and maintains straight posture, as well as using massage. She is quite active and is even exercising with a  senior group. She is breathing well and swallowing well. 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 Garcia - her voice is normal unless she talks for a long time or sings.     Her complaint today is persistent oral thrush. She sees white film in her spit in the morning when she rinses her mouth with water. She was treated for thrush by Arjun Richards with some nystatin in October.     Past Medical History:   Diagnosis Date    Adenomatous polyp of colon 3/26/2012    Allergy     Anxiety     Cancer     Cataract     Colon polyp     Degenerative arthritis of knee 04/03/2012    Diverticulosis     Encounter for blood transfusion     GERD (gastroesophageal reflux disease)     History of thyroid cancer 2021    Hyperlipidemia     Hypertension     Lateral meniscal tear 5/20/2013    Multinodular goiter 03/26/2012    Myopathy, unspecified 01/18/2010    Tuberculosis        Past Surgical History:   Procedure Laterality Date    BREAST BIOPSY Left 2015    benign    CHOLECYSTECTOMY      COLONOSCOPY  12/2/15    Dr. Britton, multiple polyps, recheck five years-three years if more than 2 polyps are adenomatous    COLONOSCOPY N/A 12/2/2015    COLONOSCOPY N/A 3/20/2019    Procedure: COLONOSCOPY;  Surgeon: Jose Britton MD;  Location: [facility];  Service: Endoscopy;  Laterality: N/A;    COLONOSCOPY N/A 5/20/2020    Dr. Britton; internal hemorrhoids; diverticulosis; polyps removed; repeat in 3 years    CYSTOSCOPY N/A 8/26/2020    Procedure: CYSTOSCOPY;  Surgeon: Charlotte Walters Jr., MD;  Location: [facility] OR;  Service: Urology;  Laterality: N/A;    DISSECTION OF NECK Left 9/13/2021    Procedure: DISSECTION, NECK;  Surgeon: Ryan Torres MD;  Location: [address];  Service: ENT;  Laterality: Left;    ESOPHAGEAL MANOMETRY WITH MEASUREMENT OF IMPEDANCE N/A 8/22/2022    Procedure: MANOMETRY, ESOPHAGUS, WITH IMPEDANCE MEASUREMENT;   Surgeon: Pantera Mcguire MD;  Location: [facility] ENDO (4TH FLR);  Service: Endoscopy;  Laterality: N/A;  8/4 fully vaccinated; instructions mailed-st    ESOPHAGOGASTRODUODENOSCOPY N/A 5/20/2020    Dr. Britton; small hiatal hernia; gastritis; 8 gastric polyps removed    ESOPHAGOGASTRODUODENOSCOPY N/A 4/1/2022    Procedure: EGD (ESOPHAGOGASTRODUODENOSCOPY);  Surgeon: Jose Britton MD;  Location: [facility];  Service: Endoscopy;  Laterality: N/A;    FOOT SURGERY      HYSTERECTOMY      TVH/BSO > 20 years    INTRALUMINAL GASTROINTESTINAL TRACT IMAGING VIA CAPSULE N/A 6/4/2020    Procedure: IMAGING PROCEDURE, GI TRACT, INTRALUMINAL, VIA CAPSULE;  Surgeon: Jose Britton MD;  Location: [facility];  Service: Endoscopy;  Laterality: N/A;    KNEE SURGERY  06/06/2013    right knee tear Dr Iverson     LAPAROSCOPIC CHOLECYSTECTOMY N/A 9/17/2020    Procedure: CHOLECYSTECTOMY, LAPAROSCOPIC;  Surgeon: Forrest Veronica MD;  Location: [facility];  Service: General;  Laterality: N/A;    LUNG LOBECTOMY  1966    right middle lobectomy, due to Tb    OOPHORECTOMY      SHOULDER SURGERY      francis     THYROIDECTOMY Bilateral 5/19/2021    Procedure: THYROIDECTOMY;  Surgeon: Jamia Amezquita MD;  Location: [facility] OR 2ND FLR;  Service: General;  Laterality: Bilateral;    THYROIDECTOMY Bilateral 9/13/2021    Procedure: THYROIDECTOMY WITH INTRA-OP PTH;  Surgeon: Ryan Torres MD;  Location: [facility] OR 2ND FLR;  Service: ENT;  Laterality: Bilateral;  NIM tube  Intraop PTH       Current Outpatient Medications:     amLODIPine (NORVASC) 2.5 MG tablet, Take 1 tablet by mouth once daily, Disp: 90 tablet, Rfl: 0    artificial tears (ISOPTO TEARS) 0.5 % ophthalmic solution, 1 drop as needed., Disp: , Rfl:     baclofen (LIORESAL) 10 MG tablet, Take 1 tablet (10 mg total) by mouth 3 (three) times daily. Start w. Half a tab first 7 days (Patient not taking: Reported on 9/8/2022), Disp: 90 tablet, Rfl: 6    blood sugar diagnostic (BLOOD GLUCOSE TEST) Strp, True  Metrix glucose strips check once daily, Disp: 100 each, Rfl: 3    blood-glucose meter kit, True Metrix glucometer check glucose once daily, Disp: 1 each, Rfl: 0    calcium carbonate (TUMS) 200 mg calcium (500 mg) chewable tablet, Chew and swallow 2 tablets (1,000 mg total) by mouth 3 (three) times daily. for 14 days, Disp: 100 tablet, Rfl: 0    conjugated estrogens (PREMARIN) vaginal cream, Place 0.5 g vaginally once daily AND 0.5 g twice a week., Disp: 30 g, Rfl: 7    coQ10, ubiquinol, 100 mg Cap, Take 100 mg by mouth once daily., Disp: , Rfl:     diclofenac-misoprostol  mg-mcg (ARTHROTEC 50)  mg-mcg per tablet, Take 1 tablet by mouth 2 (two) times daily., Disp: 90 tablet, Rfl: 3    doxepin (SINEQUAN) 25 MG capsule, Take 25 mg by mouth every evening., Disp: , Rfl:     DULoxetine (CYMBALTA) 30 MG capsule, Take 1 capsule (30 mg total) by mouth once daily., Disp: 30 capsule, Rfl: 11    DULoxetine (CYMBALTA) 30 MG capsule, Take 1 capsule (30 mg total) by mouth once daily., Disp: 30 capsule, Rfl: 11    ergocalciferol (ERGOCALCIFEROL) 50,000 unit Cap, Take 1 capsule (50,000 Units total) by mouth every 30 days., Disp: 3 capsule, Rfl: 3    gabapentin (NEURONTIN) 300 MG capsule, Take 300 mg by mouth 3 (three) times daily., Disp: , Rfl:     levothyroxine (SYNTHROID) 75 MCG tablet, Take 1 tablet (75 mcg total) by mouth before breakfast., Disp: 90 tablet, Rfl: 3    losartan (COZAAR) 100 MG tablet, Take 1 tablet (100 mg total) by mouth once daily., Disp: 90 tablet, Rfl: 1    rosuvastatin (CRESTOR) 20 MG tablet, TAKE 1/2 (ONE-HALF) TABLET BY MOUTH IN THE EVENING, Disp: 90 tablet, Rfl: 3    tobramycin-dexAMETHasone 0.3-0.1% (TOBRADEX) 0.3-0.1 % DrpS, Place 1 drop into both eyes 4 (four) times daily., Disp: 5 mL, Rfl: 0    Review of patient's allergies indicates:  No Known Allergies    Social History     Socioeconomic History    Marital status:    Tobacco Use    Smoking status: Never    Smokeless tobacco: Never    Substance and Sexual Activity    Alcohol use: Not Currently     Comment: stopped 2019    Drug use: No    Sexual activity: Not Currently       Family History   Problem Relation Age of Onset    Colon cancer Other     Cancer Mother     Hypertension Mother     Hyperlipidemia Mother     Cancer Brother     Hypertension Father     Emphysema Father     Diabetes Son     Hypertension Son     Alcohol abuse Son     Diabetes Maternal Aunt     Alzheimer's disease Maternal Uncle     Cancer Maternal Grandmother     No Known Problems Daughter     Dementia Sister     Alzheimer's disease Sister     Breast cancer Sister 60    Obesity Paternal Uncle     Prostate cancer Other     Cancer Brother     Melanoma Neg Hx     Psoriasis Neg Hx     Lupus Neg Hx     Eczema Neg Hx     Amblyopia Neg Hx     Blindness Neg Hx     Cataracts Neg Hx     Glaucoma Neg Hx     Macular degeneration Neg Hx     Retinal detachment Neg Hx     Strabismus Neg Hx     Stroke Neg Hx     Thyroid cancer Neg Hx     Colon polyps Neg Hx     Ulcerative colitis Neg Hx     Stomach cancer Neg Hx     Rectal cancer Neg Hx        Review of Systems   Constitutional:  Negative for activity change, appetite change, chills, fatigue, fever and unexpected weight change.   HENT:  Positive for voice change (only when she talks for a long time or sings). Negative for congestion, dental problem, drooling, ear discharge, ear pain, facial swelling, hearing loss, mouth sores, nosebleeds, postnasal drip, rhinorrhea, sinus pressure, sneezing, sore throat, tinnitus and trouble swallowing.    Eyes:  Negative for pain and visual disturbance.   Respiratory:  Negative for cough, choking and shortness of breath.    Cardiovascular:  Negative for chest pain, palpitations and leg swelling.   Gastrointestinal:  Negative for abdominal pain, constipation (she is taking some OTC meds at times for this), diarrhea, nausea and vomiting.   Endocrine: Negative for cold intolerance and heat intolerance.    Genitourinary:  Negative for difficulty urinating, dysuria and hematuria.   Musculoskeletal:  Positive for arthralgias, back pain, myalgias, neck pain and neck stiffness. Negative for gait problem.   Skin:  Negative for rash and wound.   Allergic/Immunologic: Negative for environmental allergies and immunocompromised state.   Neurological:  Negative for dizziness, facial asymmetry, speech difficulty, weakness, light-headedness, numbness and headaches.   Hematological:  Negative for adenopathy. Does not bruise/bleed easily.   Psychiatric/Behavioral:  Negative for dysphoric mood. The patient is not nervous/anxious.      Objective:     Physical Exam  Vitals reviewed.   Constitutional:       General: She is not in acute distress.     Appearance: She is well-developed.   HENT:      Head: Normocephalic and atraumatic.      Jaw: No trismus.      Salivary Glands: Right salivary gland is not diffusely enlarged or tender. Left salivary gland is not diffusely enlarged or tender.      Comments: Salivary glands - there are no lesions or asymmetric findings in the submandibular or parotid glands     Right Ear: Hearing, tympanic membrane, ear canal and external ear normal.      Left Ear: Hearing, tympanic membrane, ear canal and external ear normal.      Nose: No nasal deformity or rhinorrhea.      Mouth/Throat:      Mouth: No oral lesions.      Tongue: No lesions.      Palate: No mass and lesions.      Pharynx: Uvula midline.   Eyes:      General: Lids are normal.      Extraocular Movements: Extraocular movements intact.      Conjunctiva/sclera:      Right eye: Right conjunctiva is not injected. No chemosis.     Left eye: Left conjunctiva is not injected. No chemosis.  Neck:      Thyroid: No thyroid mass or thyromegaly.      Vascular: No JVD.      Trachea: Phonation normal. No tracheal deviation.     Pulmonary:      Effort: Pulmonary effort is normal. No accessory muscle usage or respiratory distress.      Breath sounds: No  stridor.   Musculoskeletal:         General: No tenderness.      Cervical back: Full passive range of motion without pain.   Lymphadenopathy:      Cervical: No cervical adenopathy.      Right cervical: No deep or posterior cervical adenopathy.     Left cervical: No deep or posterior cervical adenopathy.      Upper Body:      Right upper body: No supraclavicular adenopathy.      Left upper body: No supraclavicular adenopathy.   Skin:     Findings: No erythema, lesion or rash.      Nails: There is no clubbing.   Neurological:      Mental Status: She is alert and oriented to person, place, and time.      Cranial Nerves: No cranial nerve deficit or facial asymmetry.      Motor: No weakness.      Gait: Gait normal.   Psychiatric:         Speech: Speech normal.         Behavior: Behavior is cooperative.        Last Tg = 7.6    Assessment & Plan:       Problem List Items Addressed This Visit       Thyroid cancer - Primary (Chronic)     Overall, she is doing well and has no definitive evidence of recurrent disease. Her ultrasound was reassuring, but her Tg remains detectable, albeit lower. She saw Dr. Landrum in October, with a plan for repeat labs in 6 months. We will follow closely as well, as regional recurrence, such as in the other lateral neck, remains a possibility.     Of note, she has no evidence of thrush on exam. Her lingual papillae are pale, but otherwise normal. There is no exudate. Reassurance was provided.

## 2023-01-12 ENCOUNTER — LAB VISIT (OUTPATIENT)
Dept: LAB | Facility: HOSPITAL | Age: 78
End: 2023-01-12
Attending: FAMILY MEDICINE
Payer: MEDICARE

## 2023-01-12 DIAGNOSIS — N18.31 STAGE 3A CHRONIC KIDNEY DISEASE: ICD-10-CM

## 2023-01-12 PROCEDURE — 80048 BASIC METABOLIC PNL TOTAL CA: CPT | Performed by: FAMILY MEDICINE

## 2023-01-12 PROCEDURE — 36415 COLL VENOUS BLD VENIPUNCTURE: CPT | Mod: PO | Performed by: FAMILY MEDICINE

## 2023-01-13 LAB
ANION GAP SERPL CALC-SCNC: 7 MMOL/L (ref 8–16)
BUN SERPL-MCNC: 14 MG/DL (ref 8–23)
CALCIUM SERPL-MCNC: 8.9 MG/DL (ref 8.7–10.5)
CHLORIDE SERPL-SCNC: 104 MMOL/L (ref 95–110)
CO2 SERPL-SCNC: 29 MMOL/L (ref 23–29)
CREAT SERPL-MCNC: 0.9 MG/DL (ref 0.5–1.4)
EST. GFR  (NO RACE VARIABLE): >60 ML/MIN/1.73 M^2
GLUCOSE SERPL-MCNC: 111 MG/DL (ref 70–110)
POTASSIUM SERPL-SCNC: 4.5 MMOL/L (ref 3.5–5.1)
SODIUM SERPL-SCNC: 140 MMOL/L (ref 136–145)

## 2023-01-15 DIAGNOSIS — I15.2 HYPERTENSION ASSOCIATED WITH DIABETES: ICD-10-CM

## 2023-01-15 DIAGNOSIS — E11.59 HYPERTENSION ASSOCIATED WITH DIABETES: ICD-10-CM

## 2023-01-15 NOTE — TELEPHONE ENCOUNTER
No new care gaps identified.  [facility] Embedded Care Gaps. Reference number: [id number]. 1/15/2023   1:24:09 PM CST

## 2023-01-16 RX ORDER — AMLODIPINE BESYLATE 2.5 MG/1
2.5 TABLET ORAL DAILY
Qty: 90 TABLET | Refills: 2 | Status: SHIPPED | OUTPATIENT
Start: 2023-01-16 | End: 2023-05-09 | Stop reason: SDUPTHER

## 2023-01-16 NOTE — TELEPHONE ENCOUNTER
Refill Decision Note   Erica Masterson  is requesting a refill authorization.  Brief Assessment and Rationale for Refill:  Approve     Medication Therapy Plan:       Medication Reconciliation Completed: No   Comments:     No Care Gaps recommended.     Note composed:5:09 PM 01/16/2023

## 2023-01-18 ENCOUNTER — TELEPHONE (OUTPATIENT)
Dept: ADMINISTRATIVE | Facility: CLINIC | Age: 78
End: 2023-01-18
Payer: MEDICARE

## 2023-01-19 ENCOUNTER — OFFICE VISIT (OUTPATIENT)
Dept: FAMILY MEDICINE | Facility: CLINIC | Age: 78
End: 2023-01-19
Payer: MEDICARE

## 2023-01-19 VITALS
BODY MASS INDEX: 25.86 KG/M2 | SYSTOLIC BLOOD PRESSURE: 128 MMHG | OXYGEN SATURATION: 97 % | HEART RATE: 72 BPM | TEMPERATURE: 98 F | DIASTOLIC BLOOD PRESSURE: 78 MMHG | WEIGHT: 160.94 LBS | HEIGHT: 66 IN

## 2023-01-19 DIAGNOSIS — C73 THYROID CANCER: ICD-10-CM

## 2023-01-19 DIAGNOSIS — E11.59 HYPERTENSION ASSOCIATED WITH DIABETES: Chronic | ICD-10-CM

## 2023-01-19 DIAGNOSIS — E78.5 HYPERLIPIDEMIA ASSOCIATED WITH TYPE 2 DIABETES MELLITUS: Chronic | ICD-10-CM

## 2023-01-19 DIAGNOSIS — Z00.00 ENCOUNTER FOR PREVENTIVE HEALTH EXAMINATION: Primary | ICD-10-CM

## 2023-01-19 DIAGNOSIS — E11.9 TYPE 2 DIABETES MELLITUS WITHOUT COMPLICATION, WITHOUT LONG-TERM CURRENT USE OF INSULIN: ICD-10-CM

## 2023-01-19 DIAGNOSIS — C73 METASTASIS FROM THYROID CANCER: ICD-10-CM

## 2023-01-19 DIAGNOSIS — E11.69 HYPERLIPIDEMIA ASSOCIATED WITH TYPE 2 DIABETES MELLITUS: Chronic | ICD-10-CM

## 2023-01-19 DIAGNOSIS — I70.0 AORTIC ARCH ATHEROSCLEROSIS: ICD-10-CM

## 2023-01-19 DIAGNOSIS — I15.2 HYPERTENSION ASSOCIATED WITH DIABETES: Chronic | ICD-10-CM

## 2023-01-19 DIAGNOSIS — C79.9 METASTASIS FROM THYROID CANCER: ICD-10-CM

## 2023-01-19 DIAGNOSIS — N18.31 STAGE 3A CHRONIC KIDNEY DISEASE: Chronic | ICD-10-CM

## 2023-01-19 DIAGNOSIS — I73.9 PAD (PERIPHERAL ARTERY DISEASE): Chronic | ICD-10-CM

## 2023-01-19 PROCEDURE — 3288F FALL RISK ASSESSMENT DOCD: CPT | Mod: CPTII,S$GLB,, | Performed by: NURSE PRACTITIONER

## 2023-01-19 PROCEDURE — 1125F AMNT PAIN NOTED PAIN PRSNT: CPT | Mod: CPTII,S$GLB,, | Performed by: NURSE PRACTITIONER

## 2023-01-19 PROCEDURE — 3072F LOW RISK FOR RETINOPATHY: CPT | Mod: CPTII,S$GLB,, | Performed by: NURSE PRACTITIONER

## 2023-01-19 PROCEDURE — 1101F PT FALLS ASSESS-DOCD LE1/YR: CPT | Mod: CPTII,S$GLB,, | Performed by: NURSE PRACTITIONER

## 2023-01-19 PROCEDURE — G0439 PPPS, SUBSEQ VISIT: HCPCS | Mod: S$GLB,,, | Performed by: NURSE PRACTITIONER

## 2023-01-19 PROCEDURE — 1160F PR REVIEW ALL MEDS BY PRESCRIBER/CLIN PHARMACIST DOCUMENTED: ICD-10-PCS | Mod: CPTII,S$GLB,, | Performed by: NURSE PRACTITIONER

## 2023-01-19 PROCEDURE — 3074F PR MOST RECENT SYSTOLIC BLOOD PRESSURE < 130 MM HG: ICD-10-PCS | Mod: CPTII,S$GLB,, | Performed by: NURSE PRACTITIONER

## 2023-01-19 PROCEDURE — 1160F RVW MEDS BY RX/DR IN RCRD: CPT | Mod: CPTII,S$GLB,, | Performed by: NURSE PRACTITIONER

## 2023-01-19 PROCEDURE — 3288F PR FALLS RISK ASSESSMENT DOCUMENTED: ICD-10-PCS | Mod: CPTII,S$GLB,, | Performed by: NURSE PRACTITIONER

## 2023-01-19 PROCEDURE — 3078F DIAST BP <80 MM HG: CPT | Mod: CPTII,S$GLB,, | Performed by: NURSE PRACTITIONER

## 2023-01-19 PROCEDURE — 99999 PR PBB SHADOW E&M-EST. PATIENT-LVL V: CPT | Mod: PBBFAC,,, | Performed by: NURSE PRACTITIONER

## 2023-01-19 PROCEDURE — 3078F PR MOST RECENT DIASTOLIC BLOOD PRESSURE < 80 MM HG: ICD-10-PCS | Mod: CPTII,S$GLB,, | Performed by: NURSE PRACTITIONER

## 2023-01-19 PROCEDURE — 1101F PR PT FALLS ASSESS DOC 0-1 FALLS W/OUT INJ PAST YR: ICD-10-PCS | Mod: CPTII,S$GLB,, | Performed by: NURSE PRACTITIONER

## 2023-01-19 PROCEDURE — 3072F PR LOW RISK FOR RETINOPATHY: ICD-10-PCS | Mod: CPTII,S$GLB,, | Performed by: NURSE PRACTITIONER

## 2023-01-19 PROCEDURE — 99999 PR PBB SHADOW E&M-EST. PATIENT-LVL V: ICD-10-PCS | Mod: PBBFAC,,, | Performed by: NURSE PRACTITIONER

## 2023-01-19 PROCEDURE — 1125F PR PAIN SEVERITY QUANTIFIED, PAIN PRESENT: ICD-10-PCS | Mod: CPTII,S$GLB,, | Performed by: NURSE PRACTITIONER

## 2023-01-19 PROCEDURE — 3074F SYST BP LT 130 MM HG: CPT | Mod: CPTII,S$GLB,, | Performed by: NURSE PRACTITIONER

## 2023-01-19 PROCEDURE — 1159F PR MEDICATION LIST DOCUMENTED IN MEDICAL RECORD: ICD-10-PCS | Mod: CPTII,S$GLB,, | Performed by: NURSE PRACTITIONER

## 2023-01-19 PROCEDURE — G0439 PR MEDICARE ANNUAL WELLNESS SUBSEQUENT VISIT: ICD-10-PCS | Mod: S$GLB,,, | Performed by: NURSE PRACTITIONER

## 2023-01-19 PROCEDURE — 1159F MED LIST DOCD IN RCRD: CPT | Mod: CPTII,S$GLB,, | Performed by: NURSE PRACTITIONER

## 2023-01-19 NOTE — PROGRESS NOTES
"  Erica Masterson presented for a  Medicare AWV and comprehensive Health Risk Assessment today. The following components were reviewed and updated:    Medical history  Family History  Social history  Allergies and Current Medications  Health Risk Assessment  Health Maintenance  Care Team         ** See Completed Assessments for Annual Wellness Visit within the encounter summary.**         The following assessments were completed:  Living Situation  CAGE  Depression Screening  Timed Get Up and Go  Whisper Test  Cognitive Function Screening  Nutrition Screening  ADL Screening  PAQ Screening          Vitals:    01/19/23 1350   BP: 128/78   Pulse: 72   Temp: 98.2 °F (36.8 °C)   TempSrc: Oral   SpO2: 97%   Weight: 73 kg (160 lb 15 oz)   Height: 5' 6" (1.676 m)     Body mass index is 25.98 kg/m².  Physical Exam  Constitutional:       Appearance: She is well-developed.   HENT:      Head: Normocephalic and atraumatic.      Nose: Nose normal.   Eyes:      General: Lids are normal.      Conjunctiva/sclera: Conjunctivae normal.      Pupils: Pupils are equal, round, and reactive to light.   Cardiovascular:      Rate and Rhythm: Normal rate.   Pulmonary:      Effort: Pulmonary effort is normal.   Musculoskeletal:      Cervical back: Full passive range of motion without pain.   Skin:     General: Skin is warm and dry.   Neurological:      Mental Status: She is alert and oriented to person, place, and time.   Psychiatric:         Speech: Speech normal.         Behavior: Behavior normal.             Diagnoses and health risks identified today and associated recommendations/orders:  1. Encounter for preventive health examination  Discussed health maintenance guidelines appropriate for age.  Review for Opioid Screening: Patient does not have rx for Opioids.    Review for Substance Use Disorders: Patient does not use substance.    2. Hyperlipidemia associated with type 2 diabetes mellitus, baseline   Controlled, continue current " medication regimen  Patient taking statin  Followed by pcp      3. Hypertension associated with diabetes  Controlled, continue current medication regimen  Low salt diet  Increase physical activity  Followed by pcp    4. PAD (peripheral artery disease)  Stable continue to monitor    5. Stage 3a chronic kidney disease  Stable continue to monitor  Followed by PCP    6. Aortic arch atherosclerosis  Stable continue to monitor  On statin  Followed by PCP  7. Metastasis from thyroid cancer  Stable, continue care and follow up per ENT and endocrine    8. Thyroid cancer  Stable, continue care and follow up per ENT and endocrine    9. Type 2 diabetes mellitus without complication, without long-term current use of insulin  Controlled, continue current medication regimen  Last HgA1c 6.0  ADA diet  Increase physical activity   Followed by endo      Provided Erica with a 5-10 year written screening schedule and personal prevention plan. Recommendations were developed using the USPSTF age appropriate recommendations. Education, counseling, and referrals were provided as needed. After Visit Summary printed and given to patient which includes a list of additional screenings\tests needed.    Follow up for One year for Annual Wellness Visit.    Maci Fonseca NP  I offered to discuss advanced care planning, including how to pick a person who would make decisions for you if you were unable to make them for yourself, called a health care power of , and what kind of decisions you might make such as use of life sustaining treatments such as ventilators and tube feeding when faced with a life limiting illness recorded on a living will that they will need to know. (How you want to be cared for as you near the end of your natural life)     X Patient is interested in learning more about how to make advanced directives.  I provided them paperwork and offered to discuss this with them.

## 2023-01-19 NOTE — PATIENT INSTRUCTIONS
Counseling and Referral of Other Preventative  (Italic type indicates deductible and co-insurance are waived)    Patient Name: Erica Masterson  Today's Date: 1/19/2023    Health Maintenance       Date Due Completion Date    TETANUS VACCINE 11/02/2021 11/2/2011    COVID-19 Vaccine (5 - Booster for Pfizer series) 06/14/2022 4/19/2022    Hemoglobin A1c 02/08/2023 8/8/2022    Diabetes Urine Screening 04/06/2023 4/6/2022    Eye Exam 04/19/2023 4/19/2022    Override on 6/15/2020: Done    Lipid Panel 08/08/2023 8/8/2022    Override on 9/26/2016: Done ([place] [facility] record sent to scanning)    Colonoscopy 11/16/2023 11/16/2020    Override on 10/12/2010: Done (Dr. Solares, 5 year recheck)    DEXA Scan 04/27/2024 4/27/2021        No orders of the defined types were placed in this encounter.    The following information is provided to all patients.  This information is to help you find resources for any of the problems found today that may be affecting your health:                Living healthy guide: www.[facility].[place].gov      Understanding Diabetes: www.diabetes.org      Eating healthy: www.cdc.gov/healthyweight      CDC home safety checklist: www.cdc.gov/steadi/patient.html      Agency on Aging: www.goea.[place].[facility]      Alcoholics anonymous (AA): www.aa.org      Physical Activity: www.silas.nih.gov/zk8parw      Tobacco use: www.quitwithusla.org

## 2023-01-23 ENCOUNTER — HOSPITAL ENCOUNTER (OUTPATIENT)
Dept: RADIOLOGY | Facility: HOSPITAL | Age: 78
Discharge: HOME | End: 2023-01-23
Attending: FAMILY MEDICINE
Payer: MEDICARE

## 2023-01-23 DIAGNOSIS — R92.8 ABNORMALITY OF LEFT BREAST ON SCREENING MAMMOGRAM: ICD-10-CM

## 2023-01-23 PROCEDURE — 77065 MAMMO DIGITAL DIAGNOSTIC LEFT WITH TOMO: ICD-10-PCS | Mod: 26,LT,, | Performed by: RADIOLOGY

## 2023-01-23 PROCEDURE — 77061 BREAST TOMOSYNTHESIS UNI: CPT | Mod: 26,LT,, | Performed by: RADIOLOGY

## 2023-01-23 PROCEDURE — 76642 ULTRASOUND BREAST LIMITED: CPT | Mod: TC,LT

## 2023-01-23 PROCEDURE — 76642 ULTRASOUND BREAST LIMITED: CPT | Mod: 26,LT,, | Performed by: RADIOLOGY

## 2023-01-23 PROCEDURE — 76642 US BREAST LEFT LIMITED: ICD-10-PCS | Mod: 26,LT,, | Performed by: RADIOLOGY

## 2023-01-23 PROCEDURE — 77061 MAMMO DIGITAL DIAGNOSTIC LEFT WITH TOMO: ICD-10-PCS | Mod: 26,LT,, | Performed by: RADIOLOGY

## 2023-01-23 PROCEDURE — 77065 DX MAMMO INCL CAD UNI: CPT | Mod: 26,LT,, | Performed by: RADIOLOGY

## 2023-01-23 PROCEDURE — 77065 DX MAMMO INCL CAD UNI: CPT | Mod: TC,LT

## 2023-01-24 ENCOUNTER — TELEPHONE (OUTPATIENT)
Dept: ENDOCRINOLOGY | Facility: CLINIC | Age: 78
End: 2023-01-24
Payer: MEDICARE

## 2023-01-24 NOTE — TELEPHONE ENCOUNTER
----- Message from Tim Mckeon sent at 1/24/2023  7:09 AM CST -----  Type: Needs Medical Advice  Who Called:  Patient  Symptoms (please be specific):  Heavy mucous, difficulty swallowing,  feeling hot after waking up, numbness and tingling in toes and fingers  How long has patient had these symptoms:  A few months-getting worse    Pharmacy name and phone #:    [id number] - [place] - [address]  [place] [address]  Phone: [phone] Fax: [phone]      Best Call Back Number: [phone] or [phone]  Additional Information: Please call to advise

## 2023-01-24 NOTE — TELEPHONE ENCOUNTER
Okay to let pt know with last calcium normal, last thyroid blood tests normal as well, recommend pt check in with primary care to investigate causes and treatment for those symptoms

## 2023-01-25 ENCOUNTER — TELEPHONE (OUTPATIENT)
Dept: OTOLARYNGOLOGY | Facility: CLINIC | Age: 78
End: 2023-01-25
Payer: MEDICARE

## 2023-01-25 ENCOUNTER — OFFICE VISIT (OUTPATIENT)
Dept: FAMILY MEDICINE | Facility: CLINIC | Age: 78
End: 2023-01-25
Attending: FAMILY MEDICINE
Payer: MEDICARE

## 2023-01-25 ENCOUNTER — LAB VISIT (OUTPATIENT)
Dept: LAB | Facility: HOSPITAL | Age: 78
End: 2023-01-25
Attending: FAMILY MEDICINE
Payer: MEDICARE

## 2023-01-25 VITALS
TEMPERATURE: 99 F | DIASTOLIC BLOOD PRESSURE: 76 MMHG | BODY MASS INDEX: 25.38 KG/M2 | HEIGHT: 66 IN | RESPIRATION RATE: 17 BRPM | WEIGHT: 157.94 LBS | HEART RATE: 72 BPM | OXYGEN SATURATION: 99 % | SYSTOLIC BLOOD PRESSURE: 131 MMHG

## 2023-01-25 DIAGNOSIS — I73.9 PAD (PERIPHERAL ARTERY DISEASE): Chronic | ICD-10-CM

## 2023-01-25 DIAGNOSIS — E11.59 HYPERTENSION ASSOCIATED WITH DIABETES: Chronic | ICD-10-CM

## 2023-01-25 DIAGNOSIS — E78.5 HYPERLIPIDEMIA ASSOCIATED WITH TYPE 2 DIABETES MELLITUS: Chronic | ICD-10-CM

## 2023-01-25 DIAGNOSIS — I15.2 HYPERTENSION ASSOCIATED WITH DIABETES: Chronic | ICD-10-CM

## 2023-01-25 DIAGNOSIS — F32.9 REACTIVE DEPRESSION: Chronic | ICD-10-CM

## 2023-01-25 DIAGNOSIS — E11.00 TYPE 2 DIABETES MELLITUS WITH HYPEROSMOLARITY WITHOUT COMA, WITHOUT LONG-TERM CURRENT USE OF INSULIN: Chronic | ICD-10-CM

## 2023-01-25 DIAGNOSIS — C79.9 METASTASIS FROM THYROID CANCER: ICD-10-CM

## 2023-01-25 DIAGNOSIS — G60.9 HEREDITARY AND IDIOPATHIC PERIPHERAL NEUROPATHY: ICD-10-CM

## 2023-01-25 DIAGNOSIS — C73 THYROID CANCER: Chronic | ICD-10-CM

## 2023-01-25 DIAGNOSIS — E89.0 POSTOPERATIVE HYPOTHYROIDISM: Chronic | ICD-10-CM

## 2023-01-25 DIAGNOSIS — E83.51 HYPOCALCEMIA: ICD-10-CM

## 2023-01-25 DIAGNOSIS — F41.1 GAD (GENERALIZED ANXIETY DISORDER): Chronic | ICD-10-CM

## 2023-01-25 DIAGNOSIS — D50.9 IRON DEFICIENCY ANEMIA, UNSPECIFIED IRON DEFICIENCY ANEMIA TYPE: ICD-10-CM

## 2023-01-25 DIAGNOSIS — Z00.00 ENCOUNTER FOR PREVENTIVE HEALTH EXAMINATION: Primary | ICD-10-CM

## 2023-01-25 DIAGNOSIS — E11.69 HYPERLIPIDEMIA ASSOCIATED WITH TYPE 2 DIABETES MELLITUS: Chronic | ICD-10-CM

## 2023-01-25 DIAGNOSIS — I11.9 HYPERTENSIVE LEFT VENTRICULAR HYPERTROPHY, WITHOUT HEART FAILURE: ICD-10-CM

## 2023-01-25 DIAGNOSIS — N18.31 STAGE 3A CHRONIC KIDNEY DISEASE: Chronic | ICD-10-CM

## 2023-01-25 DIAGNOSIS — I10 HYPERTENSION, ESSENTIAL: ICD-10-CM

## 2023-01-25 DIAGNOSIS — C73 METASTASIS FROM THYROID CANCER: ICD-10-CM

## 2023-01-25 LAB
ESTIMATED AVG GLUCOSE: 128 MG/DL (ref 68–131)
HBA1C MFR BLD: 6.1 % (ref 4–5.6)

## 2023-01-25 PROCEDURE — 1126F PR PAIN SEVERITY QUANTIFIED, NO PAIN PRESENT: ICD-10-PCS | Mod: CPTII,S$GLB,, | Performed by: FAMILY MEDICINE

## 2023-01-25 PROCEDURE — 1101F PR PT FALLS ASSESS DOC 0-1 FALLS W/OUT INJ PAST YR: ICD-10-PCS | Mod: CPTII,S$GLB,, | Performed by: FAMILY MEDICINE

## 2023-01-25 PROCEDURE — 3072F PR LOW RISK FOR RETINOPATHY: ICD-10-PCS | Mod: CPTII,S$GLB,, | Performed by: FAMILY MEDICINE

## 2023-01-25 PROCEDURE — 3288F FALL RISK ASSESSMENT DOCD: CPT | Mod: CPTII,S$GLB,, | Performed by: FAMILY MEDICINE

## 2023-01-25 PROCEDURE — 99499 UNLISTED E&M SERVICE: CPT | Mod: S$GLB,,, | Performed by: FAMILY MEDICINE

## 2023-01-25 PROCEDURE — 99499 RISK ADDL DX/OHS AUDIT: ICD-10-PCS | Mod: S$GLB,,, | Performed by: FAMILY MEDICINE

## 2023-01-25 PROCEDURE — 36415 COLL VENOUS BLD VENIPUNCTURE: CPT | Performed by: FAMILY MEDICINE

## 2023-01-25 PROCEDURE — 3075F SYST BP GE 130 - 139MM HG: CPT | Mod: CPTII,S$GLB,, | Performed by: FAMILY MEDICINE

## 2023-01-25 PROCEDURE — 1160F PR REVIEW ALL MEDS BY PRESCRIBER/CLIN PHARMACIST DOCUMENTED: ICD-10-PCS | Mod: CPTII,S$GLB,, | Performed by: FAMILY MEDICINE

## 2023-01-25 PROCEDURE — 1160F RVW MEDS BY RX/DR IN RCRD: CPT | Mod: CPTII,S$GLB,, | Performed by: FAMILY MEDICINE

## 2023-01-25 PROCEDURE — 1101F PT FALLS ASSESS-DOCD LE1/YR: CPT | Mod: CPTII,S$GLB,, | Performed by: FAMILY MEDICINE

## 2023-01-25 PROCEDURE — 1126F AMNT PAIN NOTED NONE PRSNT: CPT | Mod: CPTII,S$GLB,, | Performed by: FAMILY MEDICINE

## 2023-01-25 PROCEDURE — 3072F LOW RISK FOR RETINOPATHY: CPT | Mod: CPTII,S$GLB,, | Performed by: FAMILY MEDICINE

## 2023-01-25 PROCEDURE — 99214 PR OFFICE/OUTPT VISIT, EST, LEVL IV, 30-39 MIN: ICD-10-PCS | Mod: S$GLB,,, | Performed by: FAMILY MEDICINE

## 2023-01-25 PROCEDURE — 1159F MED LIST DOCD IN RCRD: CPT | Mod: CPTII,S$GLB,, | Performed by: FAMILY MEDICINE

## 2023-01-25 PROCEDURE — 3075F PR MOST RECENT SYSTOLIC BLOOD PRESS GE 130-139MM HG: ICD-10-PCS | Mod: CPTII,S$GLB,, | Performed by: FAMILY MEDICINE

## 2023-01-25 PROCEDURE — 83036 HEMOGLOBIN GLYCOSYLATED A1C: CPT | Performed by: FAMILY MEDICINE

## 2023-01-25 PROCEDURE — 99999 PR PBB SHADOW E&M-EST. PATIENT-LVL IV: ICD-10-PCS | Mod: PBBFAC,,, | Performed by: FAMILY MEDICINE

## 2023-01-25 PROCEDURE — 1159F PR MEDICATION LIST DOCUMENTED IN MEDICAL RECORD: ICD-10-PCS | Mod: CPTII,S$GLB,, | Performed by: FAMILY MEDICINE

## 2023-01-25 PROCEDURE — 3078F PR MOST RECENT DIASTOLIC BLOOD PRESSURE < 80 MM HG: ICD-10-PCS | Mod: CPTII,S$GLB,, | Performed by: FAMILY MEDICINE

## 2023-01-25 PROCEDURE — 99214 OFFICE O/P EST MOD 30 MIN: CPT | Mod: S$GLB,,, | Performed by: FAMILY MEDICINE

## 2023-01-25 PROCEDURE — 3078F DIAST BP <80 MM HG: CPT | Mod: CPTII,S$GLB,, | Performed by: FAMILY MEDICINE

## 2023-01-25 PROCEDURE — 3288F PR FALLS RISK ASSESSMENT DOCUMENTED: ICD-10-PCS | Mod: CPTII,S$GLB,, | Performed by: FAMILY MEDICINE

## 2023-01-25 PROCEDURE — 99999 PR PBB SHADOW E&M-EST. PATIENT-LVL IV: CPT | Mod: PBBFAC,,, | Performed by: FAMILY MEDICINE

## 2023-01-25 RX ORDER — LOSARTAN POTASSIUM 100 MG/1
100 TABLET ORAL DAILY
Qty: 90 TABLET | Refills: 3 | Status: SHIPPED | OUTPATIENT
Start: 2023-01-25 | End: 2023-05-09 | Stop reason: SDUPTHER

## 2023-01-25 NOTE — TELEPHONE ENCOUNTER
----- Message from Saskia Moore MA sent at 1/24/2023  8:47 AM CST -----  Regarding: pt advice  Contact: pt at [phone] or [phone]  Pt is calling stated that  Dr. Torres  did her  procedure she is now experiencing a lot of Mucus Draining out of her  eyes, throat and nose. Stated that she have  night sweats and wake up in pain is asking for a  call from someone in the office please call pt at [phone] or [phone]

## 2023-01-27 DIAGNOSIS — R92.8 ABNORMALITY OF LEFT BREAST ON SCREENING MAMMOGRAM: Primary | ICD-10-CM

## 2023-02-01 ENCOUNTER — TELEPHONE (OUTPATIENT)
Dept: FAMILY MEDICINE | Facility: CLINIC | Age: 78
End: 2023-02-01
Payer: MEDICARE

## 2023-02-01 NOTE — TELEPHONE ENCOUNTER
----- Message from Luisana Rust sent at 2/1/2023  4:20 PM CST -----  Contact: called at [phone]  Type: Needs Medical Advice  Who Called:  Pt  Best Call Back Number: [phone]    [phone]  Additional Information: Pt is calling to see if the doctor can put in orders for the pt to have a 6 mth mammogram and U/S appt. Please call back and advise.

## 2023-02-01 NOTE — TELEPHONE ENCOUNTER
Call placed to patient for notification orders for diagnostic mammogram and breast ultrasound was placed on 1-27-23 with appointment noted on 7-24-23.  Patient verbalized understanding.

## 2023-02-14 ENCOUNTER — TELEPHONE (OUTPATIENT)
Dept: ORTHOPEDICS | Facility: CLINIC | Age: 78
End: 2023-02-14
Payer: MEDICARE

## 2023-02-14 DIAGNOSIS — M54.31 SCIATICA OF RIGHT SIDE: Primary | ICD-10-CM

## 2023-02-14 NOTE — TELEPHONE ENCOUNTER
----- Message from Mariza Arana sent at 2/14/2023  2:23 PM CST -----  Contact: [phone]  Type: Needs Medical Advice  Who Called:  Pt     Best Call Back Number: [phone] (home)     Additional Information: Pt is requesting a call back from office for scheduling. Pt only wants to speak with Dr Iverson's  nurse. Pls call back and advise

## 2023-02-14 NOTE — TELEPHONE ENCOUNTER
Pt still having the burning pain in her leg, all the way from hip to foot with burning in the toes.  Pt is no longer on Cymbalta. Pt  is requesting recommendations in regards to this issue. Former Ulises pt.

## 2023-03-02 ENCOUNTER — OFFICE VISIT (OUTPATIENT)
Dept: OTOLARYNGOLOGY | Facility: CLINIC | Age: 78
End: 2023-03-02
Payer: MEDICARE

## 2023-03-02 VITALS
DIASTOLIC BLOOD PRESSURE: 75 MMHG | WEIGHT: 162.06 LBS | BODY MASS INDEX: 26.05 KG/M2 | HEIGHT: 66 IN | SYSTOLIC BLOOD PRESSURE: 142 MMHG | HEART RATE: 77 BPM

## 2023-03-02 DIAGNOSIS — K14.6 BURNING MOUTH SYNDROME: ICD-10-CM

## 2023-03-02 DIAGNOSIS — M62.89 LARYNGEAL MUSCLE TENSION DISORDER: ICD-10-CM

## 2023-03-02 DIAGNOSIS — C73 METASTASIS FROM THYROID CANCER: ICD-10-CM

## 2023-03-02 DIAGNOSIS — C73 THYROID CANCER: ICD-10-CM

## 2023-03-02 DIAGNOSIS — C73 PAPILLARY THYROID CARCINOMA: ICD-10-CM

## 2023-03-02 DIAGNOSIS — C79.9 METASTASIS FROM THYROID CANCER: ICD-10-CM

## 2023-03-02 DIAGNOSIS — J38.01 UNILATERAL COMPLETE VOCAL FOLD PARALYSIS: ICD-10-CM

## 2023-03-02 DIAGNOSIS — F45.8 FUNCTIONAL DYSPHAGIA: Primary | ICD-10-CM

## 2023-03-02 PROCEDURE — 3288F PR FALLS RISK ASSESSMENT DOCUMENTED: ICD-10-PCS | Mod: CPTII,S$GLB,, | Performed by: OTOLARYNGOLOGY

## 2023-03-02 PROCEDURE — 1101F PT FALLS ASSESS-DOCD LE1/YR: CPT | Mod: CPTII,S$GLB,, | Performed by: OTOLARYNGOLOGY

## 2023-03-02 PROCEDURE — 99215 OFFICE O/P EST HI 40 MIN: CPT | Mod: S$GLB,,, | Performed by: OTOLARYNGOLOGY

## 2023-03-02 PROCEDURE — 1159F PR MEDICATION LIST DOCUMENTED IN MEDICAL RECORD: ICD-10-PCS | Mod: CPTII,S$GLB,, | Performed by: OTOLARYNGOLOGY

## 2023-03-02 PROCEDURE — 3077F PR MOST RECENT SYSTOLIC BLOOD PRESSURE >= 140 MM HG: ICD-10-PCS | Mod: CPTII,S$GLB,, | Performed by: OTOLARYNGOLOGY

## 2023-03-02 PROCEDURE — 3072F PR LOW RISK FOR RETINOPATHY: ICD-10-PCS | Mod: CPTII,S$GLB,, | Performed by: OTOLARYNGOLOGY

## 2023-03-02 PROCEDURE — 1160F PR REVIEW ALL MEDS BY PRESCRIBER/CLIN PHARMACIST DOCUMENTED: ICD-10-PCS | Mod: CPTII,S$GLB,, | Performed by: OTOLARYNGOLOGY

## 2023-03-02 PROCEDURE — 1160F RVW MEDS BY RX/DR IN RCRD: CPT | Mod: CPTII,S$GLB,, | Performed by: OTOLARYNGOLOGY

## 2023-03-02 PROCEDURE — 3078F DIAST BP <80 MM HG: CPT | Mod: CPTII,S$GLB,, | Performed by: OTOLARYNGOLOGY

## 2023-03-02 PROCEDURE — 99999 PR PBB SHADOW E&M-EST. PATIENT-LVL IV: CPT | Mod: PBBFAC,,, | Performed by: OTOLARYNGOLOGY

## 2023-03-02 PROCEDURE — 3288F FALL RISK ASSESSMENT DOCD: CPT | Mod: CPTII,S$GLB,, | Performed by: OTOLARYNGOLOGY

## 2023-03-02 PROCEDURE — 1126F PR PAIN SEVERITY QUANTIFIED, NO PAIN PRESENT: ICD-10-PCS | Mod: CPTII,S$GLB,, | Performed by: OTOLARYNGOLOGY

## 2023-03-02 PROCEDURE — 3078F PR MOST RECENT DIASTOLIC BLOOD PRESSURE < 80 MM HG: ICD-10-PCS | Mod: CPTII,S$GLB,, | Performed by: OTOLARYNGOLOGY

## 2023-03-02 PROCEDURE — 1126F AMNT PAIN NOTED NONE PRSNT: CPT | Mod: CPTII,S$GLB,, | Performed by: OTOLARYNGOLOGY

## 2023-03-02 PROCEDURE — 1159F MED LIST DOCD IN RCRD: CPT | Mod: CPTII,S$GLB,, | Performed by: OTOLARYNGOLOGY

## 2023-03-02 PROCEDURE — 3077F SYST BP >= 140 MM HG: CPT | Mod: CPTII,S$GLB,, | Performed by: OTOLARYNGOLOGY

## 2023-03-02 PROCEDURE — 99999 PR PBB SHADOW E&M-EST. PATIENT-LVL IV: ICD-10-PCS | Mod: PBBFAC,,, | Performed by: OTOLARYNGOLOGY

## 2023-03-02 PROCEDURE — 3072F LOW RISK FOR RETINOPATHY: CPT | Mod: CPTII,S$GLB,, | Performed by: OTOLARYNGOLOGY

## 2023-03-02 PROCEDURE — 99215 PR OFFICE/OUTPT VISIT, EST, LEVL V, 40-54 MIN: ICD-10-PCS | Mod: S$GLB,,, | Performed by: OTOLARYNGOLOGY

## 2023-03-02 PROCEDURE — 1101F PR PT FALLS ASSESS DOC 0-1 FALLS W/OUT INJ PAST YR: ICD-10-PCS | Mod: CPTII,S$GLB,, | Performed by: OTOLARYNGOLOGY

## 2023-03-02 NOTE — PROGRESS NOTES
" [facility]    Subjective:      Patient: Erica Masterson Patient PCP: Narayan Myers MD         :  1945     Sex:  female      MRN:  [id number]          Date of Visit: 2023      Chief Complaint: Dysphagia (States has a thick, white, gel-like mucous drainage in back of throat for "a while". States was able to cough it up today, prior to today, was not sure of the color of the mucous. States makes it hard to swallow. (Had thyroid surgery 1.5 years ago). Also states that still has white film on tongue. RSI 6/45, GERD 0/5. )      Patient ID: Erica Masterson is a 77 y.o. female lifelong NON-smoker with A history of peripheral neuropathy on Neurontin 300 mg 3 times daily, HLD, T2 dm, HTN, CKD 3, stage III thyroid cancer, hypo calcemia on Norvasc scan Cozaar (no Ace) self-referred for  subjective "gel like mucus" in the back of the throat years in duration.  Some difficulty swallowing attributed to the mucus.    2021 completion thyroidectomy with bilateral paratracheal and superior mediastinal dissection and a left modified radical neck dissection II-Vb for history of papillary thyroid carcinoma, left vocal cord paralysis and metastatic papillary thyroid carcinoma on the left.  Last seen by Dr. Torres two months ago.  Seen by beckie Chirinos 2022 with burning mouth syndrome and a history of recurrent thrush on gabapentin for the former.  Elavil 25 mg added to the gabapentin.  Currently on doxepin in addition to gabapentin (not on Elavil).    Last esophageal imaging fluoroscopic evaluation of the esophagus 2022 with a small hiatal hernia and mild presbyesophagus.  No good lateral esophagus/cricopharyngeus images. .  Normal-appearing AP imaging.    Thyroglobulin still elevated but reported as stable.  No suspicious adenopathy only some calcified nodules findings on ultrasound last done 2022    Lab Results   Component Value Date    TSH 3.263 2022    FREET4 0.93 2022 "       Thyroglobulin, Tumor Marker   Date Value Ref Range Status   09/30/2022 7.6 (H) ng/mL Final     Comment:     -------------------REFERENCE VALUE--------------------------  Athyrotic <0.1   Intact Thyroid <=33     09/01/2022 9.0 (H) ng/mL Final     Comment:     -------------------REFERENCE VALUE--------------------------  Athyrotic <0.1   Intact Thyroid <=33     05/05/2022 8.0 (H) ng/mL Final     Comment:     -------------------REFERENCE VALUE--------------------------  Athyrotic <0.1   Intact Thyroid <=33     12/14/2021 12 (H) ng/mL Final     Comment:     -------------------REFERENCE VALUE--------------------------  Athyrotic <0.1   Intact Thyroid <=33     10/20/2021 9.0 (H) ng/mL Final     Comment:     -------------------REFERENCE VALUE--------------------------  Athyrotic <0.1   Intact Thyroid <=33           Oncology History   Thyroid cancer   5/19/2021 Initial Diagnosis     Thyroid cancer      5/19/2021 Surgery     Total thyroidectomy (Dr. Amezquita)      8/10/2021 Cancer Staged     Staging form: Thyroid - Differentiated, AJCC 8th Edition  - Clinical stage from 8/10/2021: Stage III (cT4a, cNX, cM0, Age at diagnosis: >= 55 years)         9/13/2021 Surgery     1) Completion total thyroidectomy with bilateral paratracheal and superior mediastinal dissection  2) Left modified neck dissection of levels II-Vb         9/27/2021 Cancer Staged     Staging form: Thyroid - Differentiated, AJCC 8th Edition  - Pathologic stage from 9/27/2021: Stage III (pT4a, pN1b, cM0, Age at diagnosis: >= 55 years)         12/15/2021 -  Radiation Therapy     Treating physician: Dr. Landrum, Dr. Yepez  Total Dose: 100 mCi COOPER          Review of Systems     Past Medical History  She has a past medical history of Adenomatous polyp of colon, Allergy, Anxiety, Cancer, Cataract, Colon polyp, Degenerative arthritis of knee, Diverticulosis, Encounter for blood transfusion, GERD (gastroesophageal reflux disease), History of thyroid cancer,  Hyperlipidemia, Hypertension, Lateral meniscal tear, Multinodular goiter, Myopathy, unspecified, and Tuberculosis.    Family / Surgical / Social History  Her family history includes Alcohol abuse in her son; Alzheimer's disease in her maternal uncle and sister; Breast cancer (age of onset: 60) in her sister; Cancer in her brother, brother, maternal grandmother, and mother; Colon cancer in an other family member; Dementia in her sister; Diabetes in her maternal aunt and son; Emphysema in her father; Hyperlipidemia in her mother; Hypertension in her father, mother, and son; No Known Problems in her daughter; Obesity in her paternal uncle; Prostate cancer in an other family member.    Past Surgical History:   Procedure Laterality Date    BREAST BIOPSY Left 2015    benign    CHOLECYSTECTOMY      COLONOSCOPY  12/2/15    Dr. Britton, multiple polyps, recheck five years-three years if more than 2 polyps are adenomatous    COLONOSCOPY N/A 12/2/2015    COLONOSCOPY N/A 3/20/2019    Procedure: COLONOSCOPY;  Surgeon: Jose Britton MD;  Location: [facility];  Service: Endoscopy;  Laterality: N/A;    COLONOSCOPY N/A 5/20/2020    Dr. Britton; internal hemorrhoids; diverticulosis; polyps removed; repeat in 3 years    CYSTOSCOPY N/A 8/26/2020    Procedure: CYSTOSCOPY;  Surgeon: Charlotte Walters Jr., MD;  Location: [facility];  Service: Urology;  Laterality: N/A;    DISSECTION OF NECK Left 9/13/2021    Procedure: DISSECTION, NECK;  Surgeon: Ryan Torres MD;  Location: [facility] 2ND FLR;  Service: ENT;  Laterality: Left;    ESOPHAGEAL MANOMETRY WITH MEASUREMENT OF IMPEDANCE N/A 8/22/2022    Procedure: MANOMETRY, ESOPHAGUS, WITH IMPEDANCE MEASUREMENT;  Surgeon: Pantera Mcguire MD;  Location: [facility] (4TH FLR);  Service: Endoscopy;  Laterality: N/A;  8/4 fully vaccinated; instructions mailed-st    ESOPHAGOGASTRODUODENOSCOPY N/A 5/20/2020    Dr. Britton; small hiatal hernia; gastritis; 8 gastric polyps removed    ESOPHAGOGASTRODUODENOSCOPY  N/A 4/1/2022    Procedure: EGD (ESOPHAGOGASTRODUODENOSCOPY);  Surgeon: Jose Britton MD;  Location: [facility];  Service: Endoscopy;  Laterality: N/A;    FOOT SURGERY      HYSTERECTOMY      TVH/BSO > 20 years    INTRALUMINAL GASTROINTESTINAL TRACT IMAGING VIA CAPSULE N/A 6/4/2020    Procedure: IMAGING PROCEDURE, GI TRACT, INTRALUMINAL, VIA CAPSULE;  Surgeon: Jose Britton MD;  Location: [facility];  Service: Endoscopy;  Laterality: N/A;    KNEE SURGERY  06/06/2013    right knee tear Dr Iverson     LAPAROSCOPIC CHOLECYSTECTOMY N/A 9/17/2020    Procedure: CHOLECYSTECTOMY, LAPAROSCOPIC;  Surgeon: Forrest Veronica MD;  Location: [facility] OR;  Service: General;  Laterality: N/A;    LUNG LOBECTOMY  1966    right middle lobectomy, due to Tb    OOPHORECTOMY      SHOULDER SURGERY      francis     THYROIDECTOMY Bilateral 5/19/2021    Procedure: THYROIDECTOMY;  Surgeon: Jamia Amezquita MD;  Location: [facility] OR 2ND FLR;  Service: General;  Laterality: Bilateral;    THYROIDECTOMY Bilateral 9/13/2021    Procedure: THYROIDECTOMY WITH INTRA-OP PTH;  Surgeon: Ryan Torres MD;  Location: [facility] OR 2ND FLR;  Service: ENT;  Laterality: Bilateral;  NIM tube  Intraop PTH       Social History     Tobacco Use    Smoking status: Never    Smokeless tobacco: Never   Substance and Sexual Activity    Alcohol use: Not Currently     Comment: stopped 2019    Drug use: No    Sexual activity: Not Currently       Medications  She has a current medication list which includes the following prescription(s): amlodipine, isopto tears, blood glucose test, blood-glucose meter, conjugated estrogens, coq10 (ubiquinol), doxepin, ergocalciferol, gabapentin, levothyroxine, losartan, rosuvastatin, and calcium carbonate.      Allergies  Review of patient's allergies indicates:  No Known Allergies    All medications, allergies, and past history have been reviewed.    Objective:      Vitals:  Vitals - 1 value per visit 1/25/2023 3/2/2023 3/2/2023   SYSTOLIC 131  - 142   DIASTOLIC 76 - 75   Pulse 72 - 77   Temp 98.5 - -   Resp 17 - -   SPO2 99 - -   Weight (lb) 157.96 - 162.04   Weight (kg) 71.65 - 73.5   Height 66 - 66   BMI (Calculated) 25.5 - 26.2   VISIT REPORT - - -   Pain Score  - 0 -   Some recent data might be hidden       Body surface area is 1.85 meters squared.    Physical Exam:    GENERAL  APPEARANCE -  alert, appears stated age, and cooperative  BARRIER(S) TO COMMUNICATION -  none VOICE - appropriate for age and gender    INTEGUMENTARY  no suspicious head and neck lesions    HEENT  HEAD: Normocephalic, without obvious abnormality, atraumatic  FACE: INSPECTION - Symmetric, no signs of trauma, no suspicious lesion(s)  PALPATION -  No masses SALIVARY GLANDS - non-tender with no appreciable mass  STRENGTH - facial symmetry  NECK/THYROID:  INCISION WELL HEALED STATUS POST LEFT NECK DISSECTION AND TOTAL THYROIDECTOMY WITH NO PALPABLE MIDLINE MASS.  SLIGHT SOFT TISSUE SUBCUTANEOUS FULLNESS OF APPROXIMATELY 1.5 CENTIMETERS POSTERIOR TO THE AREA OF DISSECTION ON THE LEFT OVERLYING THE TRAPEZIUS LIKELY SURGICAL CHANGE NONTENDER    EYES  Normal occular alignment and mobility with no visible nystagmus at rest    EARS/NOSE/MOUTH/THROAT  EARS  PINNAE AND EXTERNAL EARS - no suspicious lesion OTOSCOPIC EXAM (surgical microscopy was not used for visualization/instrumentation): EAR EXAM - Normal ear canals, tympanic membranes and mobility, and middle ear spaces bilaterally.  HEARING - grossly intact to voice/finger rub    NOSE AND SINUSES  EXTERNAL NOSE - Grossly normal for age/sex  SEPTUM - normal/no obstruction on anterior exam without decongestion TURBINATES - within normal limits MUCOSA - within normal limits     MOUTH AND THROAT   ORAL CAVITY, LIPS, TEETH, GUMS & TONGUE - NOT PARTICULARLY DRY SOME HYPERKERATOSIS OF THE TONGUE SLIGHT MIDLINE DEPRESSION BUT NO MASS OR INFLAMMATORY CHANGE  OROPHARYNX /TONSILS/PHARYNGEAL WALLS/HYPOPHARYNX - no erythema or exudates  NASOPHARYNX -  limited mirror exam - unable to visualize due to anatomy/gag  LARYNX -  - limited mirror exam - LIMITED EXAM BY MIRROR BUT NO APPRECIABLE VALLECULA OR PIRIFORMS POOLING.  VOCAL CORDS NOT VISUALIZED ARYTENOID SEEN BUT UNABLE TO ASSESS MOBILITY (REPORT OF LEFT VOCAL CORD PARALYSIS).    CHEST AND LUNG   INSPECTION & AUSCULTATION - normal effort, no stridor    CARDIOVASCULAR  AUSCULTATION & PERIPHERAL VASCULAR - regular rate and rhythm.    NEUROLOGIC  MENTAL STATUS - alert, interactive CRANIAL NERVES - normal    LYMPHATIC  HEAD AND NECK - non-palpable (see neck exam); SUPRACLAVICULAR - non-palpable      Procedure(s):  None    Labs:  WBC   Date Value Ref Range Status   11/09/2022 4.50 3.90 - 12.70 K/uL Final     Hemoglobin   Date Value Ref Range Status   11/09/2022 12.2 12.0 - 16.0 g/dL Final     Platelets   Date Value Ref Range Status   11/09/2022 348 150 - 450 K/uL Final     Creatinine   Date Value Ref Range Status   01/12/2023 0.9 0.5 - 1.4 mg/dL Final     TSH   Date Value Ref Range Status   12/20/2022 3.263 0.400 - 4.000 uIU/mL Final     Glucose   Date Value Ref Range Status   01/12/2023 111 (H) 70 - 110 mg/dL Final     Hemoglobin A1C   Date Value Ref Range Status   01/25/2023 6.1 (H) 4.0 - 5.6 % Final     Comment:     ADA Screening Guidelines:  5.7-6.4%  Consistent with prediabetes  >or=6.5%  Consistent with diabetes    High levels of fetal hemoglobin interfere with the HbA1C  assay. Heterozygous hemoglobin variants (HbS, HgC, etc)do  not significantly interfere with this assay.   However, presence of multiple variants may affect accuracy.           Assessment:      Problem List Items Addressed This Visit          Oncology    Thyroid cancer (Chronic)    Metastasis from thyroid cancer     Other Visit Diagnoses       Functional dysphagia    -  Primary    Burning mouth syndrome        Laryngeal muscle tension disorder        Unilateral complete vocal fold paralysis        Papillary thyroid carcinoma                      Plan:      Discussed there no specific concerning findings on examination today.  Conservative care of dryness and throat symptoms is recommended rather than further workup at this time.  Information on throat-clearing avoidance, throat care with hydration, reflux control measures, saline rinses and vocal strain avoidance outlined.  Consider discontinuation doxepin/Sinequan as this may be substantially contributing to dryness and thickening of mucus in a patient who already has some potential dryness shows associated with prior radioactive iodine ablation.      If symptoms are not improving evaluation of some functional swallowing disorder which may create the sensation of thick mucus rather than actual thick mucus is recommended with a FEES with speech therapy (functional endoscopic evaluation of swallowing) as discussed.      Continue to follow-up with Gt Landrum and Brian as planned.  Monitor the slight fullness in the back left side of the neck pointed out today.  If it is enlarging, let Brian Barnhart or Royce know to arrange further evaluation including ultrasound-guided evaluation and biopsy.    Tongue scraping with a tongue blade as discussed.  Rinse the mouth with 1 tsp of baking soda in a couple of water regularly to pH neutralize the mouth and prevent burning.    Return with any worsening of symptoms, failure to improve, or any other concerns for further evaluation and treatment.

## 2023-03-02 NOTE — PATIENT INSTRUCTIONS
Discussed there no specific concerning findings on examination today.  Conservative care of dryness and throat symptoms is recommended rather than further workup at this time.  Information on throat-clearing avoidance, throat care with hydration, reflux control measures, saline rinses and vocal strain avoidance outlined.  Consider discontinuation doxepin/Sinequan as this may be substantially contributing to dryness and thickening of mucus in a patient who already has some potential dryness shows associated with prior radioactive iodine ablation.      If symptoms are not improving evaluation of some functional swallowing disorder which may create the sensation of thick mucus rather than actual thick mucus is recommended with a FEES with speech therapy (functional endoscopic evaluation of swallowing) as discussed.      Continue to follow-up with Gt Landrum and Brian as planned.  Monitor the slight fullness in the back left side of the neck pointed out today.  If it is enlarging, let Brian Barnhart or Royce know to arrange further evaluation including ultrasound-guided evaluation and biopsy.    Tongue scraping with a tongue blade as discussed.  Rinse the mouth with 1 tsp of baking soda in a couple of water regularly to pH neutralize the mouth and prevent burning.    Return with any worsening of symptoms, failure to improve, or any other concerns for further evaluation and treatment.      NASAL SALINE    Still saline comes in many preparations including sprays/mists, gels, and rinses.  Different preparations served different purposes.  Saline spray helps to briefly moisturize the nose and help clear mucus.  Saline gels coat the nose for longer protective benefit of keeping the linings the nose moist.  Saline rinses clear the nose and sinuses and a more thorough way in her best used for significant postnasal drip and sinus complaints.  A combination of saline sprays/mists, gels and rinses should be used to  address routine nasal clearing and dryness issues as well as flushing for better control of allergy and postnasal drip symptoms.  There is no real risk of over use of nasal saline products.  Saline sprays do not have any of the potential rebound or addiction of nasal decongestant sprays.  Nasal saline sprays and rinses should be used prior to the application of any medicated nasal sprays such as nasal steroids or nasal antihistamine sprays.        WHAT TO EXPECT FROM VOICE THERAPY    Purpose  The purpose of voice therapy is to help you find a better, more efficient way to use your voice or to reduce symptoms such as coughing, throat clearing, or difficulty breathing.  Depending on your symptoms, you may learn how to produce clearer voice quality, how to reduce fatigue or pain associated with speaking, how to take care of your voice, and how to eliminate chronic coughing or throat clearing.      Process: Evaluation  First, you may go through some initial testing.  In most cases, a videostroboscopy will be performed in order to allow your speech pathologist and your physician to look at your vocal cords to aid in deciding if you would benefit from voice therapy.  Next, you may work with the speech pathologist to assess the current capabilities of your voice.  Following evaluation, your speech pathologist will design a therapeutic plan to improve your voice as well as other symptoms that may bother you.  At the time of evaluation, your speech pathologist may provide you with exercises to try at home.      Process: Therapy  Most patients benefit from 2-8 sessions over 1-3 months.  Voice therapy involves changing the behavior of your vocal cords and speaking habits, so it is very important that you attend your appointments and do home practices as instructed by your speech pathologist.  Home practice and participation in therapy are critical to meeting your desired voice goals, so of course, we are able to work with  you to schedule appointments that are convenient for you.          VOCAL HYGIENE AND HYDRATION RECOMMENDATIONS     DO   Drink plenty of waterabout  oz a day! If your urine is pale, you should be well-hydrated.  Try some of these tips to increase hydration as well.  Eat wet snacks such as grapes, melon, cucumbers, apple slices   Reduce intake coffee and other caffeinated and carbonated beverages   Suck on pastille lozenges or sugar free candies (not mentholated lozenges)   Use a room or personal humidifier   Use sinus rinses/irrigations or nasal saline spray   Inhale steam for a few minutes before speaking or singing   Pay attention to how, and how much, you use your voice.   Give yourself vocal breaks throughout the day.   Breathe when talking, take frequent pauses for breath.   Use a microphone when speaking in front of a group of 15 or more to reduce voice strain.   Learn how to use your voice correctly to get loud with voice therapy techniques.  Stay healthy. Eat right and get plenty of rest. Follow a reflux precaution diet if indicated.   ____________________________________________________________________    REDUCE   Loud talking, yelling, cheering, or screaming. Voice injury can take place over a long time, or all at once.  If you need to talk loud or yell, be sure you are doing it properly.   Clearing your throat and forceful coughing. The vocal folds collect mucus when irritated or inflamed and this can cause the urge to clear your throat. The trauma of clearing the throat creates more inflammation and mucus. See tips above for keeping hydrated to assist with cutting back on throat clearing.  Instead of clearing your throat, try these behaviors until the sensation passes:   Take a sip of water   Silently clear with a hard exhale making an hhh sound   Swallow hard   Drying agents such as anti-histamines or decongestant medications. You should always take medications as prescribed, but keep in  mind these will dry you out, so increase your hydration!   ____________________________________________________________________    AVOID   Inhalant irritants such as smoke, strong fumes, and allergens. Take advantage of 1-800-QUIT-NOW if you are ready to stop smoking.   Unnecessary non-speech noises, such as grunting during exercise, loud noises, or excessively high- or low-pitched sounds. Save your voice for talking and singing!   Whispering. Whispering places your vocal folds in a tenser position than usual.

## 2023-03-07 ENCOUNTER — TELEPHONE (OUTPATIENT)
Dept: OTOLARYNGOLOGY | Facility: CLINIC | Age: 78
End: 2023-03-07
Payer: MEDICARE

## 2023-03-07 NOTE — TELEPHONE ENCOUNTER
Called pt back. Pt states that she thought Dr. Crane was referring her to voice therapy. Went over plan and pt instructions from last visit. Notes state that if not improving, to consider FEES with speech therapy. Pt was given printed instructions at visit with what Dr. Crane wanted pt to tr conservatively first. Pt verbalized understanding and will call back if no improvement. Thanks, Abi

## 2023-03-07 NOTE — TELEPHONE ENCOUNTER
----- Message from Nancy Sands sent at 3/7/2023  2:03 PM CST -----  Contact: pt at [phone]  Type: Needs Medical Advice  Who Called:  pt  Best Call Back Number: [phone]    Additional Information: Patient is looking for a referral to see the voice specialist. Please call back and advise. Thank you

## 2023-03-20 ENCOUNTER — TELEPHONE (OUTPATIENT)
Dept: PHYSICAL MEDICINE AND REHAB | Facility: CLINIC | Age: 78
End: 2023-03-20
Payer: MEDICARE

## 2023-03-20 NOTE — TELEPHONE ENCOUNTER
----- Message from Nancy Sands sent at 3/20/2023  8:02 AM CDT -----  Contact: [phone]  Type: Needs Medical Advice  Who Called:  venkat    Best Call Back Number: [phone]    Additional Information: Patient would like to reschedule her appt. For tomorrow to the end of the month with you. Couldn't schedule through the system. Please call back and advise. Thank you

## 2023-03-22 ENCOUNTER — TELEPHONE (OUTPATIENT)
Dept: FAMILY MEDICINE | Facility: CLINIC | Age: 78
End: 2023-03-22
Payer: MEDICARE

## 2023-03-22 ENCOUNTER — OFFICE VISIT (OUTPATIENT)
Dept: SPINE | Facility: CLINIC | Age: 78
End: 2023-03-22
Payer: MEDICARE

## 2023-03-22 VITALS — BODY MASS INDEX: 26.05 KG/M2 | WEIGHT: 162.06 LBS | HEIGHT: 66 IN

## 2023-03-22 DIAGNOSIS — M54.2 CERVICALGIA: ICD-10-CM

## 2023-03-22 DIAGNOSIS — M54.50 CHRONIC BILATERAL LOW BACK PAIN, UNSPECIFIED WHETHER SCIATICA PRESENT: Primary | ICD-10-CM

## 2023-03-22 DIAGNOSIS — M54.31 SCIATICA OF RIGHT SIDE: ICD-10-CM

## 2023-03-22 DIAGNOSIS — G89.29 CHRONIC BILATERAL LOW BACK PAIN, UNSPECIFIED WHETHER SCIATICA PRESENT: Primary | ICD-10-CM

## 2023-03-22 PROCEDURE — 1159F PR MEDICATION LIST DOCUMENTED IN MEDICAL RECORD: ICD-10-PCS | Mod: CPTII,S$GLB,, | Performed by: PHYSICAL MEDICINE & REHABILITATION

## 2023-03-22 PROCEDURE — 1125F PR PAIN SEVERITY QUANTIFIED, PAIN PRESENT: ICD-10-PCS | Mod: CPTII,S$GLB,, | Performed by: PHYSICAL MEDICINE & REHABILITATION

## 2023-03-22 PROCEDURE — 99213 PR OFFICE/OUTPT VISIT, EST, LEVL III, 20-29 MIN: ICD-10-PCS | Mod: S$GLB,,, | Performed by: PHYSICAL MEDICINE & REHABILITATION

## 2023-03-22 PROCEDURE — 99213 OFFICE O/P EST LOW 20 MIN: CPT | Mod: S$GLB,,, | Performed by: PHYSICAL MEDICINE & REHABILITATION

## 2023-03-22 PROCEDURE — 3072F LOW RISK FOR RETINOPATHY: CPT | Mod: CPTII,S$GLB,, | Performed by: PHYSICAL MEDICINE & REHABILITATION

## 2023-03-22 PROCEDURE — 1160F RVW MEDS BY RX/DR IN RCRD: CPT | Mod: CPTII,S$GLB,, | Performed by: PHYSICAL MEDICINE & REHABILITATION

## 2023-03-22 PROCEDURE — 3072F PR LOW RISK FOR RETINOPATHY: ICD-10-PCS | Mod: CPTII,S$GLB,, | Performed by: PHYSICAL MEDICINE & REHABILITATION

## 2023-03-22 PROCEDURE — 3288F FALL RISK ASSESSMENT DOCD: CPT | Mod: CPTII,S$GLB,, | Performed by: PHYSICAL MEDICINE & REHABILITATION

## 2023-03-22 PROCEDURE — 1159F MED LIST DOCD IN RCRD: CPT | Mod: CPTII,S$GLB,, | Performed by: PHYSICAL MEDICINE & REHABILITATION

## 2023-03-22 PROCEDURE — 1101F PT FALLS ASSESS-DOCD LE1/YR: CPT | Mod: CPTII,S$GLB,, | Performed by: PHYSICAL MEDICINE & REHABILITATION

## 2023-03-22 PROCEDURE — 3288F PR FALLS RISK ASSESSMENT DOCUMENTED: ICD-10-PCS | Mod: CPTII,S$GLB,, | Performed by: PHYSICAL MEDICINE & REHABILITATION

## 2023-03-22 PROCEDURE — 1125F AMNT PAIN NOTED PAIN PRSNT: CPT | Mod: CPTII,S$GLB,, | Performed by: PHYSICAL MEDICINE & REHABILITATION

## 2023-03-22 PROCEDURE — 1101F PR PT FALLS ASSESS DOC 0-1 FALLS W/OUT INJ PAST YR: ICD-10-PCS | Mod: CPTII,S$GLB,, | Performed by: PHYSICAL MEDICINE & REHABILITATION

## 2023-03-22 PROCEDURE — 1160F PR REVIEW ALL MEDS BY PRESCRIBER/CLIN PHARMACIST DOCUMENTED: ICD-10-PCS | Mod: CPTII,S$GLB,, | Performed by: PHYSICAL MEDICINE & REHABILITATION

## 2023-03-22 NOTE — TELEPHONE ENCOUNTER
Sudha LOU Staff  Caller: Unspecified (Today,  4:00 PM)    ----- Message from Sudha Nails sent at 3/22/2023  4:00 PM CDT -----  Type: Needs Medical Advice  Who Called:  pt     Best Call Back Number: [phone] (home)     Additional Information: pt sts DR. Myers is leaving and wants a few recommendations pcp doctors . And wants to confirm that this info is corrected please advise thank you

## 2023-03-22 NOTE — TELEPHONE ENCOUNTER
----- Message from Sudha Nails sent at 3/22/2023  4:00 PM CDT -----  Type: Needs Medical Advice  Who Called:  pt     Best Call Back Number: [phone] (home)     Additional Information: pt sts DR. Myers is leaving and wants a few recommendations pcp doctors . And wants to confirm that this info is corrected please advise thank you

## 2023-03-22 NOTE — PROGRESS NOTES
SUBJECTIVE:    Patient ID: Erica Masterson is a 77 y.o. female.    Chief Complaint: Low-back Pain and Neck Pain    This is a 77-year-old woman I saw for the 1st and only time on 03/03/2021 with complaints of neck pain without radicular symptoms.  Physical therapy was recommended.  I have not seen her since then.  In the interim since I saw her she had a thyroidectomy for thyroid cancer.  She presents with ongoing complaints of posterior neck discomfort radiating into the upper shoulders with occasional numbness and tingling radiating down the right arm.  She is complaining of low back pain at the lumbosacral junction which occasional radiating numbness and tingling in the right leg.  Bowel and bladder remain intact.  No fever chills sweats or unexpected weight loss.  Pain level is 4 out 10.  She is done physical therapy for her neck in the past with some success.  She is interested in getting back into therapy      I reviewed an MRI of the lumbar spine from 08/06/2022 which is summarized below:    FINDINGS:  The spine is imaged from T10 through S2.     Alignment: Mild grade 1 anterolisthesis of L4 on L5, unchanged compared to CT 06/09/2020.     Vertebrae: Unchanged hemangioma within the S1 vertebral body.  Otherwise, normal marrow signal. No fracture.     Discs: Mild intervertebral disc space height loss and loss of normal T2 signal at L4-L5 and L5-S1 keeping with disc desiccation.     Cord: Normal.  Conus terminates at L1.     Degenerative findings:     T12-L1: No spinal canal or neural foraminal stenosis.     L1-L2: Central posterior disc protrusion.  No spinal canal or neural foraminal stenosis.     L2-L3: Mild circumferential disc bulge.  No spinal canal or neural foraminal stenosis.     L3-L4: Circumferential disc bulge, facet arthrosis and ligamentum flavum thickening.  No spinal canal or neural foraminal stenosis.     L4-L5: Circumferential disc bulge, facet arthrosis and ligamentum flavum thickening.  No  spinal canal or neural foraminal stenosis.  The disc contacts the descending bilateral L5 nerve roots.     L5-S1: Circumferential disc bulge.  No spinal canal or neural foraminal stenosis.     Paraspinal muscles & soft tissues: Unremarkable.     Impression:     Mild grade 1 anterolisthesis of L4 on L5 and multilevel lumbar spondylosis.  No significant spinal canal stenosis or neural foraminal stenosis.     Circumferential disc bulge at L4-L5 contacts the descending bilateral L5 nerve roots.      I reviewed an MRI of the cervical spine from 03/01/2021 which is summarized below:    FINDINGS:  Vertebral body heights appear well preserved.  Vertebral body alignment appears adequate.  Intervertebral disc height loss is noted at the C4-C5, C5-C6, and C6-C7 levels.  Mild mucous membrane thickening is noted at the floor of the left maxillary sinus.     No STIR signal abnormality is noted to suggest an aggressive bone marrow replacement process or recent fracture.     At the C2-C3 level, no significant disc bulge, central canal stenosis, or neural foraminal stenosis is noted.     At the C3-C4 level, mild broad-based bulging is noted approximately 2 mm paracentric to the left.  Facet arthropathy is noted bilaterally.  Uncovertebral spurring to the left greater than right is noted.  Mild to moderate left and mild right neural foraminal narrowing appears to be present.  Minimal thecal sac narrowing is noted to approximately 13 mm.     At the C4-C5 level, uncovertebral spurring is noted left greater right.  Disc osteophyte bulge appears to be present towards the far lateral left and left neural foramen of 4-5 mm.  Moderate left neural foraminal stenosis appears to be present with possible contact of the exiting nerve root.  Mild right neural foraminal stenosis appears to be present.  Mild thecal sac narrowing is noted to 12 mm.  The anterior thecal sac is thinned, possible anterior cord contact is noted.     At the C5-C6 level,  broad-based disc osteophyte bulge appears to be present centrally of 3 mm with asymmetric bulge towards the left neural foramen greater than right of 4 mm.  Moderate left neural foraminal stenosis is noted with possible contact of the exiting nerve root.  Mild right neural foraminal narrowing is noted.  Mild thecal sac narrowing is noted to 12 mm.  The anterior thecal sac is thinned.  Possible anterior cord contact is noted.     At the C6-C7 level, protrusion appears to be present paracentric to the left of 4 mm with possible anterior cord contact.  Only mild thecal sac narrowing is noted to 11 mm.  Mild bilateral neural foraminal narrowing appears to be present.     At the C7-T1 level, no significant disc bulge, central canal stenosis, or neural foraminal stenosis is noted.     Nodularity to the left thyroid gland appears to be present with a nodule of 2.3 cm.  Ultrasound evaluation of the thyroid gland was noted on 10/15/2019.  Ultrasound comparison may be of use.     Impression:     1. Degenerative changes most notable at the C4-C5, C5-C6, and C6-C7 levels as described above.  Left-sided neural foraminal stenosis is noted particularly at the C3-C4, C4-C5, and C5-C6 levels.  2. Large left thyroid nodule, thyroid ultrasound with comparison to 10/15/2019 would be useful        Past Medical History:   Diagnosis Date    Adenomatous polyp of colon 3/26/2012    Allergy     Anxiety     Cancer     Cataract     Colon polyp     Degenerative arthritis of knee 04/03/2012    Diverticulosis     Encounter for blood transfusion     GERD (gastroesophageal reflux disease)     History of thyroid cancer 2021    Hyperlipidemia     Hypertension     Lateral meniscal tear 5/20/2013    Multinodular goiter 03/26/2012    Myopathy, unspecified 01/18/2010    Tuberculosis      Social History     Socioeconomic History    Marital status:    Tobacco Use    Smoking status: Never    Smokeless tobacco: Never   Substance and Sexual Activity     Alcohol use: Not Currently     Comment: stopped 2019    Drug use: No    Sexual activity: Not Currently     Social Determinants of Health     Financial Resource Strain: Low Risk     Difficulty of Paying Living Expenses: Not hard at all   Food Insecurity: No Food Insecurity    Worried About Running Out of Food in the Last Year: Never true    Ran Out of Food in the Last Year: Never true   Transportation Needs: No Transportation Needs    Lack of Transportation (Medical): No    Lack of Transportation (Non-Medical): No   Physical Activity: Sufficiently Active    Days of Exercise per Week: 3 days    Minutes of Exercise per Session: 60 min   Stress: No Stress Concern Present    Feeling of Stress : Not at all   Social Connections: Moderately Integrated    Frequency of Communication with Friends and Family: Three times a week    Frequency of Social Gatherings with Friends and Family: Three times a week    Attends Faith Services: More than 4 times per year    Active Member of Clubs or Organizations: No    Attends Club or Organization Meetings: Never    Marital Status:    Housing Stability: Low Risk     Unable to Pay for Housing in the Last Year: No    Number of Places Lived in the Last Year: 1    Unstable Housing in the Last Year: No     Past Surgical History:   Procedure Laterality Date    BREAST BIOPSY Left 2015    benign    CHOLECYSTECTOMY      COLONOSCOPY  12/2/15    Dr. Britton, multiple polyps, recheck five years-three years if more than 2 polyps are adenomatous    COLONOSCOPY N/A 12/2/2015    COLONOSCOPY N/A 3/20/2019    Procedure: COLONOSCOPY;  Surgeon: Jose Britton MD;  Location: [facility];  Service: Endoscopy;  Laterality: N/A;    COLONOSCOPY N/A 5/20/2020    Dr. Britton; internal hemorrhoids; diverticulosis; polyps removed; repeat in 3 years    CYSTOSCOPY N/A 8/26/2020    Procedure: CYSTOSCOPY;  Surgeon: Charlotte Walters Jr., MD;  Location: [facility];  Service: Urology;  Laterality: N/A;    DISSECTION OF  NECK Left 9/13/2021    Procedure: DISSECTION, NECK;  Surgeon: Ryan Torres MD;  Location: [facility] OR 2ND FLR;  Service: ENT;  Laterality: Left;    ESOPHAGEAL MANOMETRY WITH MEASUREMENT OF IMPEDANCE N/A 8/22/2022    Procedure: MANOMETRY, ESOPHAGUS, WITH IMPEDANCE MEASUREMENT;  Surgeon: Pantera Mcguire MD;  Location: [facility] ENDO (4TH FLR);  Service: Endoscopy;  Laterality: N/A;  8/4 fully vaccinated; instructions mailed-st    ESOPHAGOGASTRODUODENOSCOPY N/A 5/20/2020    Dr. Britton; small hiatal hernia; gastritis; 8 gastric polyps removed    ESOPHAGOGASTRODUODENOSCOPY N/A 4/1/2022    Procedure: EGD (ESOPHAGOGASTRODUODENOSCOPY);  Surgeon: Jose Britton MD;  Location: [facility];  Service: Endoscopy;  Laterality: N/A;    FOOT SURGERY      HYSTERECTOMY      TVH/BSO > 20 years    INTRALUMINAL GASTROINTESTINAL TRACT IMAGING VIA CAPSULE N/A 6/4/2020    Procedure: IMAGING PROCEDURE, GI TRACT, INTRALUMINAL, VIA CAPSULE;  Surgeon: Jose Brittno MD;  Location: [facility];  Service: Endoscopy;  Laterality: N/A;    KNEE SURGERY  06/06/2013    right knee tear Dr Iverson     LAPAROSCOPIC CHOLECYSTECTOMY N/A 9/17/2020    Procedure: CHOLECYSTECTOMY, LAPAROSCOPIC;  Surgeon: Forrest Veronica MD;  Location: [facility];  Service: General;  Laterality: N/A;    LUNG LOBECTOMY  1966    right middle lobectomy, due to Tb    OOPHORECTOMY      SHOULDER SURGERY      francis     THYROIDECTOMY Bilateral 5/19/2021    Procedure: THYROIDECTOMY;  Surgeon: Jamia Amezquita MD;  Location: [facility] OR 2ND FLR;  Service: General;  Laterality: Bilateral;    THYROIDECTOMY Bilateral 9/13/2021    Procedure: THYROIDECTOMY WITH INTRA-OP PTH;  Surgeon: Ryan Torres MD;  Location: [facility] OR 2ND FLR;  Service: ENT;  Laterality: Bilateral;  NIM tube  Intraop PTH     Family History   Problem Relation Age of Onset    Colon cancer Other     Cancer Mother     Hypertension Mother     Hyperlipidemia Mother     Cancer Brother     Hypertension Father     Emphysema Father      "Diabetes Son     Hypertension Son     Alcohol abuse Son     Diabetes Maternal Aunt     Alzheimer's disease Maternal Uncle     Cancer Maternal Grandmother     No Known Problems Daughter     Dementia Sister     Alzheimer's disease Sister     Breast cancer Sister 60    Obesity Paternal Uncle     Prostate cancer Other     Cancer Brother     Melanoma Neg Hx     Psoriasis Neg Hx     Lupus Neg Hx     Eczema Neg Hx     Amblyopia Neg Hx     Blindness Neg Hx     Cataracts Neg Hx     Glaucoma Neg Hx     Macular degeneration Neg Hx     Retinal detachment Neg Hx     Strabismus Neg Hx     Stroke Neg Hx     Thyroid cancer Neg Hx     Colon polyps Neg Hx     Ulcerative colitis Neg Hx     Stomach cancer Neg Hx     Rectal cancer Neg Hx      Vitals:    03/22/23 0908   Weight: 73.5 kg (162 lb 0.6 oz)   Height: 5' 6" (1.676 m)       Review of Systems   Constitutional:  Negative for chills, diaphoresis, fatigue, fever and unexpected weight change.   HENT:  Negative for trouble swallowing.    Eyes:  Negative for visual disturbance.   Respiratory:  Negative for shortness of breath.    Cardiovascular:  Negative for chest pain.   Gastrointestinal:  Negative for abdominal pain, constipation, nausea and vomiting.   Genitourinary:  Negative for difficulty urinating.   Musculoskeletal:  Negative for arthralgias, back pain, gait problem, joint swelling, myalgias, neck pain and neck stiffness.   Neurological:  Negative for dizziness, speech difficulty, weakness, light-headedness, numbness and headaches.        Objective:      Physical Exam  Neurological:      Mental Status: She is alert and oriented to person, place, and time.      Comments: She is awake and in no acute distress  Mild tenderness to palpation posterior cervical paraspinous musculature with no external lesions or palpable masses  Mild tenderness palpation lumbar paraspinous musculature at the lumbosacral junction with no palpable masses  Forward flexion of the lumbar spine is normal " and painless.  Extension causes mild pain at the lumbosacral junction  Reflexes- +1-+2 reflexes at the following:   C5-Biceps   C6-Brachioradialis   C7-Triceps   L3/4-Patellar   S1-Achilles   Sinan sign negative bilaterally  Strength testing- 5/5 strength in the following muscle groups:  C5-Elbow flexion  C6-Wrist extension  C7-Elbow extension  C8-Finger flexion  T1-Finger abduction  L2-Hip flexion  L3-Knee extension  L4-Ankle dorsiflexion  L5-Great toe extension  S1-Ankle plantar flexion       Straight leg raise negative bilaterally  JOSE test negative bilaterally           Assessment:       1. Chronic bilateral low back pain, unspecified whether sciatica present    2. Sciatica of right side    3. Cervicalgia             Plan:     She remains neurologically intact.  Per her request we will get her scheduled for physical therapy to work on her neck and low back pain.  Consider epidural steroid injection versus radiofrequency ablation.  She does have some low back pain with facet loading.  She can follow up with me after therapy or as needed      Chronic bilateral low back pain, unspecified whether sciatica present  -     Ambulatory referral/consult to Physical/Occupational Therapy; Future; Expected date: 03/29/2023    Sciatica of right side  -     Ambulatory referral[facility]consult to [facility]    Cervicalgia  -     Ambulatory referral/consult to Physical/Occupational Therapy; Future; Expected date: 03/29/2023

## 2023-03-22 NOTE — TELEPHONE ENCOUNTER
Call placed to patient who states she already spoke to SCOTTY Arteaga regarding this matter. States no further assistance is needed.

## 2023-03-23 ENCOUNTER — TELEPHONE (OUTPATIENT)
Dept: NEUROLOGY | Facility: CLINIC | Age: 78
End: 2023-03-23
Payer: MEDICARE

## 2023-03-23 NOTE — TELEPHONE ENCOUNTER
----- Message from Nancy Henrytammy sent at 3/23/2023  3:36 PM CDT -----  Contact: pt at [phone]  Type:  Patient Returning Call    Who Called:  pt  Who Left Message for Patient:  aidee easley    Best Call Back Number:  [phone]    Additional Information:  Patient would like to reschedule appt. With Dr. Lovett. Please call back and advise. Thank you

## 2023-03-23 NOTE — TELEPHONE ENCOUNTER
----- Message from Rhoda Cruz sent at 3/23/2023  9:04 AM CDT -----  Regarding: reschedule appointment  Contact: mal  Type:  Sooner Appointment Request    Caller is requesting a sooner appointment.  Caller declined first available appointment listed below.  Caller will not accept being placed on the waitlist and is requesting a message be sent to doctor.    Name of Caller:  patient  When is the first available appointment?  03/23/23  Symptoms:  tremors  Best Call Back Number:  [phone] (home)   Additional Information:  patient wants to see if PT will help but would like to reschedule for next month. Please call patient. Thanks!

## 2023-04-06 ENCOUNTER — CLINICAL SUPPORT (OUTPATIENT)
Dept: REHABILITATION | Facility: HOSPITAL | Age: 78
End: 2023-04-06
Attending: PHYSICAL MEDICINE & REHABILITATION
Payer: MEDICARE

## 2023-04-06 DIAGNOSIS — R29.3 POOR POSTURE: ICD-10-CM

## 2023-04-06 DIAGNOSIS — M54.50 CHRONIC BILATERAL LOW BACK PAIN, UNSPECIFIED WHETHER SCIATICA PRESENT: ICD-10-CM

## 2023-04-06 DIAGNOSIS — G89.29 CHRONIC BILATERAL LOW BACK PAIN, UNSPECIFIED WHETHER SCIATICA PRESENT: ICD-10-CM

## 2023-04-06 DIAGNOSIS — M54.2 CERVICALGIA: ICD-10-CM

## 2023-04-06 DIAGNOSIS — R29.898 DECREASED STRENGTH OF LOWER EXTREMITY: ICD-10-CM

## 2023-04-06 PROCEDURE — 97161 PT EVAL LOW COMPLEX 20 MIN: CPT | Mod: PN

## 2023-04-06 NOTE — PLAN OF CARE
[facility]   Physical Therapy Initial Evaluation       Name: Erica Masterson  Clinic Number: [id number]    Therapy Diagnosis:   Encounter Diagnoses   Name Primary?    Chronic bilateral low back pain, unspecified whether sciatica present     Cervicalgia     Decreased strength of lower extremity     Poor posture         Physician: Dustin Escoto MD    Physician Orders: PT Eval and Treat  Medical Diagnosis from Referral:   M54.50,G89.29 (ICD-10-CM) - Chronic bilateral low back pain, unspecified whether sciatica present   M54.2 (ICD-10-CM) - Cervicalgia     Evaluation Date: 4/6/2023  Authorization Period Expiration: 5/4/2023  Plan of Care Expiration: 7/30/2023  Progress Note Due: 5/6/2023  Visit # / Visits authorized: 1/ 1   FOTO: 1/3    Precautions: Standard, Diabetes, and cancer     Time In: 815 AM  Time Out: 900 AM  Total Appointment Time (timed & untimed codes): 45 minutes      SUBJECTIVE     Date of onset: low back pain started years ago, had therapy a few years ago for her back pain as well     History of current condition - Erica reports: low back seems to have gotten worse over time. She was having low back pain prior to thyroid surgery and she was getting therapy before the surgery but after surgery had therapy for her neck. Went to Dr Escoto. She had injections in the neck following the thyroid surgery. Reports tightness in the R LE she feels like she has to rub it after getting up in the morning and following period of sitting. She reports when she gets up she cant really move. When she sits for period of times and first thing in the morning, she has to wiggle and massage her R LE before she is able to move. She is very compliant with HEP for the neck. Before seeing dr escoto, yes there was numbness and tingling but it seems to be improving as long as she is massaging the muscles and moving. She sleeps on the L side and this seems to help. Reports pain radiates to the front of  her body at times too. If she lays on the R side she is stiffer and the muscles seem tighter.    Falls: 0    Imaging: MRI studies: 2022    Mild grade 1 anterolisthesis of L4 on L5 and multilevel lumbar spondylosis.  No significant spinal canal stenosis or neural foraminal stenosis.     Circumferential disc bulge at L4-L5 contacts the descending bilateral L5 nerve roots.       Prior Therapy: yes for low back and neck   Social History: one story home but elevated  lives with their spouse  Occupation: retired   Hobbies: likes to exercise/walk, has an instructor who comes 3x/week, does card games, meetings   Prior Level of Function: independent   Current Level of Function: she can do just about anything, sometimes when she comes up the stairs she needs to use the rail and seems like she has to pull herself up     Pain:  Current 6/10, worst 8/10, best 3/10   Location: bilateral back   Description: Tight and pinching, numbness but massage helps  Aggravating Factors: Sitting, Morning, and following exercise/over exercised   Easing Factors: hot bath    Patients goals: to get back where she used to be with her body, improving her diet, and keep saying nice things to people      Medical History:   Past Medical History:   Diagnosis Date    Adenomatous polyp of colon 3/26/2012    Allergy     Anxiety     Cancer     Cataract     Colon polyp     Degenerative arthritis of knee 04/03/2012    Diverticulosis     Encounter for blood transfusion     GERD (gastroesophageal reflux disease)     History of thyroid cancer 2021    Hyperlipidemia     Hypertension     Lateral meniscal tear 5/20/2013    Multinodular goiter 03/26/2012    Myopathy, unspecified 01/18/2010    Tuberculosis        Surgical History:   Erica Masterson  has a past surgical history that includes Foot surgery; Shoulder surgery; Lung lobectomy (1966); Knee surgery (06/06/2013); Oophorectomy; Esophagogastroduodenoscopy (N/A, 5/20/2020); Breast biopsy (Left, 2015);  Colonoscopy (12/2/15); Colonoscopy (N/A, 12/2/2015); Colonoscopy (N/A, 3/20/2019); Colonoscopy (N/A, 5/20/2020); Intraluminal gastrointestinal tract imaging via capsule (N/A, 6/4/2020); Cystoscopy (N/A, 8/26/2020); Laparoscopic cholecystectomy (N/A, 9/17/2020); Thyroidectomy (Bilateral, 5/19/2021); Thyroidectomy (Bilateral, 9/13/2021); Dissection of neck (Left, 9/13/2021); Hysterectomy; Cholecystectomy; Esophagogastroduodenoscopy (N/A, 4/1/2022); and Esophageal manometry with measurement of impedance (N/A, 8/22/2022).    Medications:   Erica has a current medication list which includes the following prescription(s): amlodipine, isopto tears, blood glucose test, blood-glucose meter, calcium carbonate, conjugated estrogens, coq10 (ubiquinol), doxepin, ergocalciferol, gabapentin, levothyroxine, losartan, and rosuvastatin.    Allergies:   Review of patient's allergies indicates:  No Known Allergies       OBJECTIVE     Structural/Postural Inspection:     R iliac crest slightly higher compared to L likely due to mal alignment of R knee which demonstrates increased valgus compared to LLE ( history of meniscus surgery)  Decreased lumbar lordosis, thoracic kyphosis     Active Range of Motion (AROM)/Resisted Movements:     Lumbar/Thoracic AROM (Degrees) Pain/Dysfunctional Movement    Flexion WFL    Extension WFL    Right side glide  Minimal limitation     Left side glide  Minimal limitation     Right Rotation Minimal limitation     Left Rotation Minimal limitation       Repeated Movements:     Repeated Movements/Direction Specific Description  (Before, during, and after)   Postural Correction  NT   Standing Extension (EIS) better   Standing Flexion (FIS) better   Supine Flexion (CHAPINCITO) better   Prone Extension (EIL) better     Static positions  Description  (Before, during, and after)   Prone lie  No pain    Prone on elbows  No pain    Sitting with lumbar roll NT   Sitting slouched  NT     SI Tests/Screen:    Sacroiliac Results  Comments   Forten's Sign  Negative.    TTP of Posterior Iliac ligaments  Negative.    SLS pain Negative. More difficulty with RLE   SI Distraction * Negative.    SI Compression * Negative.    Sacral Thrust * Negative.    Gaenslen's* Nt    Thigh Thrust* NT    FABERs Negative.      LE MMT Testing:     RLE Strength Grade LLE Strength Grade   Hip Flexion (L2) 4/5 Hip Flexion (L2) 4/5   Hip Extension  3/5 Hip Extension 3/5   Hip Abduction 3-/5 Hip Abduction 3-/5   Hip Adduction 3+/5 Hip Adduction 3+/5   Hip Internal Rotation 4/5 Hip Internal Rotation 4/5   Hip External Rotation 4-/5 Hip External Rotation 4-/5   Knee Flexion  4+/5 Knee Flexion 4+/5   Knee Extension (L3) 5/5 Knee Extension (L3) 5/5   Ankle Dorsiflexion (L4) 5/5 Ankle Dorsiflexion (L4) 5/5   Great toe extension (L5) NT Great toe extension (L5) NT   Ankle Plantarflexion (S1) NT Ankle Plantarflexion (S1) NT     Muscle Length Tension Testing:     Lumbar/Hip/Knee Right  Left  Comments   Hamstring Length (90/90) Moderate limitation  Minimal limitation     Ely's Test NT NT      Hip Screen:   WFL for hip AROM    Hip Right Left   Trendelenburg Test Negative. Negative.   FADDIR Test Negative. Negative.   Scour Test Negative. Negative.   Long Sellers Distraction NT NT     Sensation:    Peripheral Nerves Right  Left   L2 intact. intact.   L3 intact. intact.   L4 intact. intact.   L5 intact. intact.   S1 intact. intact.     Neural Tension Testing Right  Left Comments   Seated Slump Negative. Negative.    SLR (Sciatic) Negative. Negative.      Reflexes Positive Right  Positive Left Comments   Patellar (L3-L4) 0 0    Achilles (S1) 1+ 1+      Palpation for Tenderness: neg for tenderness to palpation of B PSIS, slight tenderness to R glut/piriformis compared to L    Joint Accessory: NT    Outcome Measures:     Limitation/Restriction for FOTO 4/6/2023 Survey    Therapist reviewed FOTO scores for Erica Masterson on 4/6/2023.   FOTO documents entered into EPIC - see Media  section.    Limitation Score: 44%         TREATMENT     Total Treatment time (time-based codes) separate from Evaluation: 3 minutes      Erica received the treatments listed below:      therapeutic exercises to develop strength, endurance, ROM, flexibility, posture, and core stabilization for 3 minutes including:    Educated patient on HEP exercises. Had pt return demonstration to assess understanding and proper form. Pt demonstrated good understanding based on returned demonstration and verbalizations.     PATIENT EDUCATION AND HOME EXERCISES     Education provided:   - HEP  - therapy goals and POC     Written Home Exercises Provided: yes. Exercises were reviewed and Erica was able to demonstrate them prior to the end of the session.  Erica demonstrated good  understanding of the education provided. See EMR under Patient Instructions for exercises provided during therapy sessions.    ASSESSMENT     Erica is a 77 y.o. female referred to outpatient Physical Therapy with a medical diagnosis of   M54.50,G89.29 (ICD-10-CM) - Chronic bilateral low back pain, unspecified whether sciatica present   M54.2 (ICD-10-CM) - Cervicalgia     Patient presents with c/o chronic low back pain that started a few years ago. She received Physical Therapy in the past but ceased due to requiring thyroid surgery. She then received therapy for her neck post surgery of removal of thyroid. She expresses low back pain in the morning and after periods of sitting and RLE tightness. She demonstrates minimal ROM limitations with standing lumbar AROM. She denied pain in all directions but reported some stretching in most directions. She tolerated prone and standing extensions well and given subjective reports would benefit from extension based movements but she did not demonstrate a directional preference as she responded well to movements in both extension and flexion directions. She also demonstrates decreased BLE strength. Her RLE is also slightly  longer than her LLE especially in standing as RLE demonstrates greater valgus angle compared to L in which she has history of meniscus surgery on the R. Patient will benefit from skilled Physical Therapist services to address above deficits which will lead to improved functional mobility and quality of life.    Patient prognosis is Good.   Patient will benefit from skilled outpatient Physical Therapy to address the deficits stated above and in the chart below, provide patient /family education, and to maximize patientt's level of independence.     Plan of care discussed with patient: Yes  Patient's spiritual, cultural and educational needs considered and patient is agreeable to the plan of care and goals as stated below:     Anticipated Barriers for therapy: co morbidities     Medical Necessity is demonstrated by the following  History  Co-morbidities and personal factors that may impact the plan of care Co-morbidities:   advanced age, anxiety, depression, diabetes, history of cancer, and HTN    Personal Factors:   age     high   Examination  Body Structures and Functions, activity limitations and participation restrictions that may impact the plan of care Body Regions:   neck  back  lower extremities  upper extremities    Body Systems:    gross symmetry  ROM  strength  gross coordinated movement    Participation Restrictions:   None     Activity limitations:   Learning and applying knowledge  no deficits    General Tasks and Commands  no deficits    Communication  no deficits    Mobility  lifting and carrying objects  walking    Self care  no deficits    Domestic Life  shopping  cooking  doing house work (cleaning house, washing dishes, laundry)    Interactions/Relationships  no deficits    Life Areas  no deficits    Community and Social Life  community life  recreation and leisure         high   Clinical Presentation stable and uncomplicated low   Decision Making/ Complexity Score: low     Goals:    STG   Weeks/Visits Date Established  Goal Status    1.Patient will report >/= 30% improvement since evaluation to improve ability to carry out IADLS 3 weeks/ 6 visits  4/6/2023      2. Patient will report </= 3/10 current pain and </= 5/10 for at worst pain to improve quality of life  3 weeks/ 6 visits  4/6/2023      3.Patient will report decreased RLE stiffness in the morning to improve functional mobility  3 weeks/ 6 visits  4/6/2023      4.Pt will demonstrate and verbalize compliance and independence with HEP to improve therapy outcomes  3 weeks/ 6 visits  4/6/2023      5. Patient will report </= 40% limitation on FOTO to improve activity participation  3 weeks/ 6 visits  4/6/2023        LTG Weeks/Visits Date Established  Goal Status    1.Patient will report >/= 50% improvement since evaluation to improve ability to carry out IADLS 6 weeks/ 12 visits  4/6/2023      2. Patient will report </= 1/10 current pain and </= 3/10 for at worst pain to improve quality of life  6 weeks/ 12 visits  4/6/2023        3.Patient will report </= 35% limitation on FOTO to improve activity participation  6 weeks/ 12 visits  4/6/2023      4. Patient will increase BLE strength to >/= 4/5 to improve ability to carry out hobbies  6 weeks/ 12 visits  4/6/2023      5.  4/6/2023          PLAN   Plan of care Certification: 4/6/2023 to 7/30/2023.    Outpatient Physical Therapy 2 times weekly for 6 weeks to include the following interventions: Gait Training, Manual Therapy, Moist Heat/ Ice, Neuromuscular Re-ed, Patient Education, Therapeutic Activities, and Therapeutic Exercise.     Diamond Hampton, PT      I CERTIFY THE NEED FOR THESE SERVICES FURNISHED UNDER THIS PLAN OF TREATMENT AND WHILE UNDER MY CARE   Physician's comments:     Physician's Signature: ___________________________________________________

## 2023-04-10 ENCOUNTER — CLINICAL SUPPORT (OUTPATIENT)
Dept: REHABILITATION | Facility: HOSPITAL | Age: 78
End: 2023-04-10
Payer: MEDICARE

## 2023-04-10 DIAGNOSIS — R29.3 POOR POSTURE: ICD-10-CM

## 2023-04-10 DIAGNOSIS — R29.898 DECREASED STRENGTH OF LOWER EXTREMITY: Primary | ICD-10-CM

## 2023-04-10 PROCEDURE — 97110 THERAPEUTIC EXERCISES: CPT | Mod: PN,CQ

## 2023-04-10 NOTE — PROGRESS NOTES
[facility]   Physical Therapy Treatment Note     Name: Erica Masterson  [facility] Number: [id number]    Therapy Diagnosis:   Encounter Diagnoses   Name Primary?    Decreased strength of lower extremity Yes    Poor posture      Physician: Dustin Cortes MD    Visit Date: 4/10/2023    Physician Orders: PT Eval and Treat  Medical Diagnosis from Referral:   M54.50,G89.29 (ICD-10-CM) - Chronic bilateral low back pain, unspecified whether sciatica present   M54.2 (ICD-10-CM) - Cervicalgia      Evaluation Date: 4/6/2023  Authorization Period Expiration: 5/4/2023  Plan of Care Expiration: 7/30/2023  Progress Note Due: 5/6/2023  Visit # / Visits authorized: 1 / 11  FOTO: 1/3     Precautions: Standard, Diabetes, and cancer        PTA Visit #: 1/5     FOTO first follow up:   FOTO second follow up:     Time In: 0900  Time Out: 0945  Total Billable Time: 45 minutes; 25 min 1:1    SUBJECTIVE     Pt reports: Patient sates when she wakes up she feels like her R leg is really tight. When she stands she has to make sure her legs are situated before walking.  She was compliant with home exercise program.  Response to previous treatment: positive  Functional change: First treat after eval     Pain: 3/10  Location: low back    OBJECTIVE     Objective Measures updated at progress report unless specified.     Treatment     Erica received the treatments listed below:      manual therapy techniques: Joint mobilizations and Manual traction were applied to the: R Leg  for 5 minutes, including:  LAD on R LE    therapeutic exercises to develop strength, endurance, and ROM for 40 minutes including:  Seated thoracic extensions, 2 x 10   Supine sciatic N sliders, x 15 ea   Bridging, 3 x 10   Prone hip extension, 3 x 10 B   Standing lumbar extensions over table, 2 x 15  Sit<>stands, 2 x 10   Lumbar ext machine, 2 x 10 50#    Patient Education and Home Exercises     Home Exercises Provided and Patient Education Provided      Education provided:   - Continue HEP     Written Home Exercises Provided: Patient instructed to cont prior HEP. Exercises were reviewed and Erica was able to demonstrate them prior to the end of the session.  Erica demonstrated good  understanding of the education provided. See EMR under Patient Instructions for exercises provided during therapy sessions    ASSESSMENT     HS tightness noted with Sciatic N sliders. Patient with improved symptoms following extension based therex. Pt with good tolerance to therapy session with no adverse effects reported.    harris Maldonado Is progressing well towards her goals.   Pt prognosis is Good.     Pt will continue to benefit from skilled outpatient physical therapy to address the deficits listed in the problem list box on initial evaluation, provide pt/family education and to maximize pt's level of independence in the home and community environment.     Pt's spiritual, cultural and educational needs considered and pt agreeable to plan of care and goals.     Anticipated barriers to physical therapy: co morbidities   Goals:     STG  Weeks/Visits Date Established  Goal Status    1.Patient will report >/= 30% improvement since evaluation to improve ability to carry out IADLS 3 weeks/ 6 visits  4/6/2023     4/10/2023   Progressing   2. Patient will report </= 3/10 current pain and </= 5/10 for at worst pain to improve quality of life  3 weeks/ 6 visits  4/6/2023     4/10/2023  Progressing   3.Patient will report decreased RLE stiffness in the morning to improve functional mobility  3 weeks/ 6 visits  4/6/2023     4/10/2023  Progressing      4.Pt will demonstrate and verbalize compliance and independence with HEP to improve therapy outcomes  3 weeks/ 6 visits  4/6/2023     4/10/2023   Progressing      5. Patient will report </= 40% limitation on FOTO to improve activity participation  3 weeks/ 6 visits  4/6/2023     4/10/2023   Progressing       LTG Weeks/Visits Date Established  Goal  Status    1.Patient will report >/= 50% improvement since evaluation to improve ability to carry out IADLS 6 weeks/ 12 visits  4/6/2023     4/10/2023   Progressing    2. Patient will report </= 1/10 current pain and </= 3/10 for at worst pain to improve quality of life  6 weeks/ 12 visits  4/6/2023        4/10/2023   Progressing    3.Patient will report </= 35% limitation on FOTO to improve activity participation  6 weeks/ 12 visits  4/6/2023     4/10/2023   Progressing      4. Patient will increase BLE strength to >/= 4/5 to improve ability to carry out hobbies  6 weeks/ 12 visits  4/6/2023     4/10/2023   Progressing   5.   4/6/2023                PLAN     Continue to treat and progress per POC and pt tolerance      Jimmy Esqueda, MARISOL

## 2023-04-13 ENCOUNTER — CLINICAL SUPPORT (OUTPATIENT)
Dept: REHABILITATION | Facility: HOSPITAL | Age: 78
End: 2023-04-13
Payer: MEDICARE

## 2023-04-13 DIAGNOSIS — R29.898 DECREASED STRENGTH OF LOWER EXTREMITY: Primary | ICD-10-CM

## 2023-04-13 DIAGNOSIS — R29.3 POOR POSTURE: ICD-10-CM

## 2023-04-13 PROCEDURE — 97110 THERAPEUTIC EXERCISES: CPT | Mod: PN,CQ

## 2023-04-13 PROCEDURE — 97112 NEUROMUSCULAR REEDUCATION: CPT | Mod: PN,CQ

## 2023-04-13 NOTE — PROGRESS NOTES
[facility]   Physical Therapy Treatment Note     Name: Erica Masterson  Clinic Number: [id number]    Therapy Diagnosis:   Encounter Diagnoses   Name Primary?    Decreased strength of lower extremity Yes    Poor posture      Physician: Dustin Cortes MD    Visit Date: 4/13/2023    Physician Orders: PT Eval and Treat  Medical Diagnosis from Referral:   M54.50,G89.29 (ICD-10-CM) - Chronic bilateral low back pain, unspecified whether sciatica present   M54.2 (ICD-10-CM) - Cervicalgia      Evaluation Date: 4/6/2023  Authorization Period Expiration: 5/4/2023  Plan of Care Expiration: 7/30/2023  Progress Note Due: 5/6/2023  Visit # / Visits authorized: 2 / 11  FOTO: 1/3     Precautions: Standard, Diabetes, and cancer        PTA Visit #: 2/5     FOTO first follow up:   FOTO second follow up:     Time In: 0900  Time Out: 1000  Total Billable Time: 55 minutes  SUBJECTIVE     Pt reports: She is compliant with Home Exercise Program at home. Improved symptomology after last treatment session. Didn't have as much pain when she woke up this morning.     She was compliant with home exercise program.  Response to previous treatment: positive  Functional change: as noted    Pain: 3/10  Location: low back    OBJECTIVE     Objective Measures updated at progress report unless specified.     Treatment     Erica received the treatments listed below:      manual therapy techniques: Joint mobilizations and Manual traction were applied to the: R Leg  for 5 minutes, including:  LAD on R LE    therapeutic exercises to develop strength, endurance, and ROM for 40 minutes including:  Seated thoracic extensions, 2 x 10   Thread needle x 15 repetitions   Open books x 15 repetitions   Standing lumbar extensions over table, 2 x 15  Straight leg raises 2 x 10 repetitions to right lower extremity   Sit<>stands, 2 x 10   Lumbar ext machine, 2 x 10 50#    Erica participated in neuromuscular re-education activities to improve:  Coordination, Kinesthetic, Sense, Proprioception, and Posture for 15 minutes. The following activities were included:    Supine sciatic N sliders, x 15 ea   Bridging, 3 x 10   Prone hip extension, 3 x 10 B   Clams with red theraband 3 x 10 repetitions         Patient Education and Home Exercises     Home Exercises Provided and Patient Education Provided     Education provided:   - Continue HEP     Written Home Exercises Provided: Patient instructed to cont prior HEP. Exercises were reviewed and Erica was able to demonstrate them prior to the end of the session.  Erica demonstrated good  understanding of the education provided. See EMR under Patient Instructions for exercises provided during therapy sessions    ASSESSMENT     Incorporated more thoracic and lumbar mobility as well as hip and quad strengthening this date to improve support to lumbar spine. She responded well with reports of improved symptomology following treatment session. Will continue to benefit from skilled Physical Therapy to maximize strength gains to allow her to return to prior level of function without pain or limitations.      Erica Is progressing well towards her goals.   Pt prognosis is Good.     Pt will continue to benefit from skilled outpatient physical therapy to address the deficits listed in the problem list box on initial evaluation, provide pt/family education and to maximize pt's level of independence in the home and community environment.     Pt's spiritual, cultural and educational needs considered and pt agreeable to plan of care and goals.     Anticipated barriers to physical therapy: co morbidities   Goals:     STG  Weeks/Visits Date Established  Goal Status    1.Patient will report >/= 30% improvement since evaluation to improve ability to carry out IADLS 3 weeks/ 6 visits  4/6/2023 4/13/2023   Progressing   2. Patient will report </= 3/10 current pain and </= 5/10 for at worst pain to improve quality of life  3 weeks/ 6  visits  4/6/2023 4/13/2023  Progressing   3.Patient will report decreased RLE stiffness in the morning to improve functional mobility  3 weeks/ 6 visits  4/6/2023 4/13/2023  Progressing      4.Pt will demonstrate and verbalize compliance and independence with HEP to improve therapy outcomes  3 weeks/ 6 visits  4/6/2023 4/13/2023   Progressing      5. Patient will report </= 40% limitation on FOTO to improve activity participation  3 weeks/ 6 visits  4/6/2023 4/13/2023   Progressing       LTG Weeks/Visits Date Established  Goal Status    1.Patient will report >/= 50% improvement since evaluation to improve ability to carry out IADLS 6 weeks/ 12 visits  4/6/2023 4/13/2023   Progressing    2. Patient will report </= 1/10 current pain and </= 3/10 for at worst pain to improve quality of life  6 weeks/ 12 visits  4/6/2023 4/13/2023   Progressing    3.Patient will report </= 35% limitation on FOTO to improve activity participation  6 weeks/ 12 visits  4/6/2023 4/13/2023   Progressing      4. Patient will increase BLE strength to >/= 4/5 to improve ability to carry out hobbies  6 weeks/ 12 visits  4/6/2023 4/13/2023   Progressing   5.   4/6/2023                PLAN     Continue to treat and progress per POC and pt tolerance      Ananya Tam, PTA

## 2023-04-18 ENCOUNTER — CLINICAL SUPPORT (OUTPATIENT)
Dept: REHABILITATION | Facility: HOSPITAL | Age: 78
End: 2023-04-18
Payer: MEDICARE

## 2023-04-18 DIAGNOSIS — R29.898 DECREASED STRENGTH OF LOWER EXTREMITY: Primary | ICD-10-CM

## 2023-04-18 DIAGNOSIS — R29.3 POOR POSTURE: ICD-10-CM

## 2023-04-18 PROCEDURE — 97110 THERAPEUTIC EXERCISES: CPT | Mod: PN,CQ

## 2023-04-18 PROCEDURE — 97112 NEUROMUSCULAR REEDUCATION: CPT | Mod: PN,CQ

## 2023-04-18 NOTE — PROGRESS NOTES
[facility]   Physical Therapy Treatment Note     Name: Erica Masterson  Clinic Number: [id number]    Therapy Diagnosis:   Encounter Diagnoses   Name Primary?    Decreased strength of lower extremity Yes    Poor posture      Physician: Dustin Cortes MD    Visit Date: 4/18/2023    Physician Orders: PT Eval and Treat  Medical Diagnosis from Referral:   M54.50,G89.29 (ICD-10-CM) - Chronic bilateral low back pain, unspecified whether sciatica present   M54.2 (ICD-10-CM) - Cervicalgia      Evaluation Date: 4/6/2023  Authorization Period Expiration: 5/4/2023  Plan of Care Expiration: 7/30/2023  Progress Note Due: 5/6/2023  Visit # / Visits authorized: 3 / 11  FOTO: 1/3     Precautions: Standard, Diabetes, and cancer        PTA Visit #: 4/5     FOTO first follow up:   FOTO second follow up:     Time In: 0800  Time Out: 0845  Total Billable Time: 45 minutes  SUBJECTIVE     Pt reports: She is compliant with Home Exercise Program at home. She continues to do well with no reports of pain     She was compliant with home exercise program.  Response to previous treatment: positive  Functional change: as noted    Pain: 3/10  Location: low back    OBJECTIVE     Objective Measures updated at progress report unless specified.     Treatment     Erica received the treatments listed below:      manual therapy techniques: Joint mobilizations and Manual traction were applied to the: R Leg  for 0 minutes, including:  LAD on R LE    therapeutic exercises to develop strength, endurance, and ROM for 30 minutes including:  Seated thoracic extensions, 2 x 10   Standing lumbar extensions over table, 2 x 15  Open books x 15 repetitions   Straight leg raises 2 x 10 repetitions to right lower extremity   Sit<>stands, 2 x 10 -np  Lumbar ext machine, 2 x 10 50# - np     Erica participated in neuromuscular re-education activities to improve: Coordination, Kinesthetic, Sense, Proprioception, and Posture for 15 minutes. The  following activities were included:    Supine sciatic N sliders, x 15 ea   Bridging, 3 x 10   Prone hip extension, 3 x 10 B -np  Clams with red theraband 3 x 10 repetitions         Patient Education and Home Exercises     Home Exercises Provided and Patient Education Provided     Education provided:   - Continue HEP     Written Home Exercises Provided: Patient instructed to cont prior HEP. Exercises were reviewed and Erica was able to demonstrate them prior to the end of the session.  Erica demonstrated good  understanding of the education provided. See EMR under Patient Instructions for exercises provided during therapy sessions    ASSESSMENT     Patient did well with all therex without provocation of pain. She continues to require additional glute and hip strengthening as she has difficulty achieving FROM with bridges and had c/o patellar tracking issues. She requested to leave early this date due to having another apt somewhere.     Erica Is progressing well towards her goals.   Pt prognosis is Good.     Pt will continue to benefit from skilled outpatient physical therapy to address the deficits listed in the problem list box on initial evaluation, provide pt/family education and to maximize pt's level of independence in the home and community environment.     Pt's spiritual, cultural and educational needs considered and pt agreeable to plan of care and goals.     Anticipated barriers to physical therapy: co morbidities   Goals:     STG  Weeks/Visits Date Established  Goal Status    1.Patient will report >/= 30% improvement since evaluation to improve ability to carry out IADLS 3 weeks/ 6 visits  4/6/2023 4/18/2023   Progressing   2. Patient will report </= 3/10 current pain and </= 5/10 for at worst pain to improve quality of life  3 weeks/ 6 visits  4/6/2023 4/18/2023  Progressing   3.Patient will report decreased RLE stiffness in the morning to improve functional mobility  3 weeks/ 6 visits  4/6/2023      4/18/2023  Progressing      4.Pt will demonstrate and verbalize compliance and independence with HEP to improve therapy outcomes  3 weeks/ 6 visits  4/6/2023 4/18/2023   Progressing      5. Patient will report </= 40% limitation on FOTO to improve activity participation  3 weeks/ 6 visits  4/6/2023 4/18/2023   Progressing       LTG Weeks/Visits Date Established  Goal Status    1.Patient will report >/= 50% improvement since evaluation to improve ability to carry out IADLS 6 weeks/ 12 visits  4/6/2023 4/18/2023   Progressing    2. Patient will report </= 1/10 current pain and </= 3/10 for at worst pain to improve quality of life  6 weeks/ 12 visits  4/6/2023 4/18/2023   Progressing    3.Patient will report </= 35% limitation on FOTO to improve activity participation  6 weeks/ 12 visits  4/6/2023 4/18/2023   Progressing      4. Patient will increase BLE strength to >/= 4/5 to improve ability to carry out hobbies  6 weeks/ 12 visits  4/6/2023 4/18/2023   Progressing   5.   4/6/2023                PLAN     Continue to treat and progress per POC and pt tolerance      Jimmy Esqueda, PTA

## 2023-04-20 ENCOUNTER — CLINICAL SUPPORT (OUTPATIENT)
Dept: REHABILITATION | Facility: HOSPITAL | Age: 78
End: 2023-04-20
Payer: MEDICARE

## 2023-04-20 DIAGNOSIS — R29.898 DECREASED STRENGTH OF LOWER EXTREMITY: Primary | ICD-10-CM

## 2023-04-20 DIAGNOSIS — R29.3 POOR POSTURE: ICD-10-CM

## 2023-04-20 PROCEDURE — 97110 THERAPEUTIC EXERCISES: CPT | Mod: PN,CQ

## 2023-04-20 PROCEDURE — 97112 NEUROMUSCULAR REEDUCATION: CPT | Mod: PN,CQ

## 2023-04-20 NOTE — PROGRESS NOTES
[facility]   Physical Therapy Treatment Note     Name: Erica Masterson  Clinic Number: [id number]    Therapy Diagnosis:   Encounter Diagnoses   Name Primary?    Decreased strength of lower extremity Yes    Poor posture      Physician: Dustin Cortes MD    Visit Date: 4/20/2023    Physician Orders: PT Eval and Treat  Medical Diagnosis from Referral:   M54.50,G89.29 (ICD-10-CM) - Chronic bilateral low back pain, unspecified whether sciatica present   M54.2 (ICD-10-CM) - Cervicalgia      Evaluation Date: 4/6/2023  Authorization Period Expiration: 5/4/2023  Plan of Care Expiration: 7/30/2023  Progress Note Due: 5/6/2023  Visit # / Visits authorized: 3 / 11  FOTO: 1/3     Precautions: Standard, Diabetes, and cancer        PTA Visit #: 4/5     FOTO first follow up:   FOTO second follow up:     Time In: 1400  Time Out: 1445  Total Billable Time: 45 minutes  SUBJECTIVE     Pt reports: She is experiencing burning sensation along her lower spine at night.     She was compliant with home exercise program.  Response to previous treatment: positive  Functional change: as noted    Pain: 3/10  Location: low back    OBJECTIVE     Objective Measures updated at progress report unless specified.     Treatment     Erica received the treatments listed below:      manual therapy techniques: Joint mobilizations and Manual traction were applied to the: R Leg  for 0 minutes, including:  LAD on R LE    therapeutic exercises to develop strength, endurance, and ROM for 30 minutes including:  Seated thoracic extensions, 2 x 10   Standing lumbar extensions over table, 2 x 15  Open books x 15 repetitions   Straight leg raises 2 x 10 repetitions to right lower extremity   Sit<>stands, 2 x 10 -np  Lumbar ext machine, 2 x 10 50# - np     Erica participated in neuromuscular re-education activities to improve: Coordination, Kinesthetic, Sense, Proprioception, and Posture for 15 minutes. The following activities were  included:    Supine sciatic N sliders, x 15 ea   Bridging, 3 x 10 RTB  Prone hip extension, 3 x 10 B  Clams with green theraband 3 x 10 repetitions         Patient Education and Home Exercises     Home Exercises Provided and Patient Education Provided     Education provided:   - Continue HEP     Written Home Exercises Provided: Patient instructed to cont prior HEP. Exercises were reviewed and Erica was able to demonstrate them prior to the end of the session.  Erica demonstrated good  understanding of the education provided. See EMR under Patient Instructions for exercises provided during therapy sessions    ASSESSMENT     Patient did well with all therex without provocation of pain. She demo improved strength with bridges today although required mod verbal cues to perform correctly. Pt with good tolerance to therapy session with no adverse effects reported.         Erica Is progressing well towards her goals.   Pt prognosis is Good.     Pt will continue to benefit from skilled outpatient physical therapy to address the deficits listed in the problem list box on initial evaluation, provide pt/family education and to maximize pt's level of independence in the home and community environment.     Pt's spiritual, cultural and educational needs considered and pt agreeable to plan of care and goals.     Anticipated barriers to physical therapy: co morbidities   Goals:     STG  Weeks/Visits Date Established  Goal Status    1.Patient will report >/= 30% improvement since evaluation to improve ability to carry out IADLS 3 weeks/ 6 visits  4/6/2023 4/20/2023   Progressing   2. Patient will report </= 3/10 current pain and </= 5/10 for at worst pain to improve quality of life  3 weeks/ 6 visits  4/6/2023 4/20/2023  Progressing   3.Patient will report decreased RLE stiffness in the morning to improve functional mobility  3 weeks/ 6 visits  4/6/2023 4/20/2023  Progressing      4.Pt will demonstrate and verbalize  compliance and independence with HEP to improve therapy outcomes  3 weeks/ 6 visits  4/6/2023 4/20/2023   Progressing      5. Patient will report </= 40% limitation on FOTO to improve activity participation  3 weeks/ 6 visits  4/6/2023 4/20/2023   Progressing       LTG Weeks/Visits Date Established  Goal Status    1.Patient will report >/= 50% improvement since evaluation to improve ability to carry out IADLS 6 weeks/ 12 visits  4/6/2023 4/20/2023   Progressing    2. Patient will report </= 1/10 current pain and </= 3/10 for at worst pain to improve quality of life  6 weeks/ 12 visits  4/6/2023 4/20/2023   Progressing    3.Patient will report </= 35% limitation on FOTO to improve activity participation  6 weeks/ 12 visits  4/6/2023 4/20/2023   Progressing      4. Patient will increase BLE strength to >/= 4/5 to improve ability to carry out hobbies  6 weeks/ 12 visits  4/6/2023 4/20/2023   Progressing   5.   4/6/2023                PLAN     Continue to treat and progress per POC and pt tolerance      Jimmy Esqueda, MARISOL

## 2023-04-25 ENCOUNTER — CLINICAL SUPPORT (OUTPATIENT)
Dept: REHABILITATION | Facility: HOSPITAL | Age: 78
End: 2023-04-25
Payer: MEDICARE

## 2023-04-25 DIAGNOSIS — R29.3 POOR POSTURE: ICD-10-CM

## 2023-04-25 DIAGNOSIS — R29.898 DECREASED STRENGTH OF LOWER EXTREMITY: Primary | ICD-10-CM

## 2023-04-25 PROCEDURE — 97110 THERAPEUTIC EXERCISES: CPT | Mod: PN,CQ

## 2023-04-25 PROCEDURE — 97112 NEUROMUSCULAR REEDUCATION: CPT | Mod: PN,CQ

## 2023-04-25 NOTE — PROGRESS NOTES
[facility]   Physical Therapy Treatment Note     Name: Erica Masterson  Clinic Number: [id number]    Therapy Diagnosis:   Encounter Diagnoses   Name Primary?    Decreased strength of lower extremity Yes    Poor posture      Physician: Dustin Cortes MD    Visit Date: 4/25/2023    Physician Orders: PT Eval and Treat  Medical Diagnosis from Referral:   M54.50,G89.29 (ICD-10-CM) - Chronic bilateral low back pain, unspecified whether sciatica present   M54.2 (ICD-10-CM) - Cervicalgia      Evaluation Date: 4/6/2023  Authorization Period Expiration: 5/4/2023  Plan of Care Expiration: 7/30/2023  Progress Note Due: 5/6/2023  Visit # / Visits authorized: 3 / 11  FOTO: 1/3     Precautions: Standard, Diabetes, and cancer        PTA Visit #: 4/5     FOTO first follow up:   FOTO second follow up:     Time In: 0800  Time Out: 0845  Total Billable Time: 45 minutes  SUBJECTIVE     Pt reports: She reports improvement in burning sensation in R LE     She was compliant with home exercise program.  Response to previous treatment: positive  Functional change: as noted    Pain: 3/10  Location: low back    OBJECTIVE     Objective Measures updated at progress report unless specified.     Treatment     Erica received the treatments listed below:      manual therapy techniques: Joint mobilizations and Manual traction were applied to the: R Leg  for 0 minutes, including:  LAD on R LE    therapeutic exercises to develop strength, endurance, and ROM for 30 minutes including:  Seated thoracic extensions, 2 x 10   Standing lumbar extensions over table, 2 x 15  Open books x 15 repetitions   Straight leg raises 2 x 10 2# repetitions to right lower extremity     Erica participated in neuromuscular re-education activities to improve: Coordination, Kinesthetic, Sense, Proprioception, and Posture for 15 minutes. The following activities were included:    Supine sciatic N sliders, x 15 ea   Bridging, 3 x 10 RTB  Prone hip  extension, 3 x 10 B  Clams with green theraband 3 x 10 repetitions         Patient Education and Home Exercises     Home Exercises Provided and Patient Education Provided     Education provided:   - Continue HEP     Written Home Exercises Provided: Patient instructed to cont prior HEP. Exercises were reviewed and Erica was able to demonstrate them prior to the end of the session.  Erica demonstrated good  understanding of the education provided. See EMR under Patient Instructions for exercises provided during therapy sessions    ASSESSMENT     Patient able to meet 3 STG and 1 LTG this date. She reports less stiffness and consistency with HEP and workout at home. Pt with good tolerance to therapy session with no adverse effects reported.    \       Erica Is progressing well towards her goals.   Pt prognosis is Good.     Pt will continue to benefit from skilled outpatient physical therapy to address the deficits listed in the problem list box on initial evaluation, provide pt/family education and to maximize pt's level of independence in the home and community environment.     Pt's spiritual, cultural and educational needs considered and pt agreeable to plan of care and goals.     Anticipated barriers to physical therapy: co morbidities   Goals:     STG  Weeks/Visits Date Established  Goal Status    1.Patient will report >/= 30% improvement since evaluation to improve ability to carry out IADLS 3 weeks/ 6 visits  4/6/2023 4/25/2023   Progressing   2. Patient will report </= 3/10 current pain and </= 5/10 for at worst pain to improve quality of life  3 weeks/ 6 visits  4/6/2023 4/25/2023  Progressing   3.Patient will report decreased RLE stiffness in the morning to improve functional mobility  3 weeks/ 6 visits  4/6/2023    MET   4/25/23     4.Pt will demonstrate and verbalize compliance and independence with HEP to improve therapy outcomes  3 weeks/ 6 visits  4/6/2023    MET   4/25/23     5. Patient will report  </= 40% limitation on FOTO to improve activity participation  3 weeks/ 6 visits  4/6/2023    MET   4/25/2023       LTG Weeks/Visits Date Established  Goal Status    1.Patient will report >/= 50% improvement since evaluation to improve ability to carry out IADLS 6 weeks/ 12 visits  4/6/2023 4/25/2023   Progressing    2. Patient will report </= 1/10 current pain and </= 3/10 for at worst pain to improve quality of life  6 weeks/ 12 visits  4/6/2023 4/25/2023   Progressing    3.Patient will report </= 35% limitation on FOTO to improve activity participation  6 weeks/ 12 visits  4/6/2023    MET   4/25/23     4. Patient will increase BLE strength to >/= 4/5 to improve ability to carry out hobbies  6 weeks/ 12 visits  4/6/2023 4/25/2023   Progressing   5.   4/6/2023                PLAN     Continue to treat and progress per POC and pt tolerance      Jimmy Esqueda, PTA

## 2023-04-26 ENCOUNTER — TELEPHONE (OUTPATIENT)
Dept: SPINE | Facility: CLINIC | Age: 78
End: 2023-04-26
Payer: MEDICARE

## 2023-04-26 NOTE — TELEPHONE ENCOUNTER
----- Message from Valery Rosales sent at 4/26/2023  2:13 PM CDT -----  Regarding: appt access  Name of Caller: TODD RODRIGUEZ [[id number]]      When is the first available appointment? Pt would like nurse to give her a call to schedule appt. Please advise.       Symptoms: f/u       Best Call Back Number: [phone]        Additional Information:

## 2023-04-26 NOTE — TELEPHONE ENCOUNTER
Returned call to patient, schedule appt for  f/u to P.T. on 5/9/2023 at 945, verbalizes understanding.

## 2023-04-27 ENCOUNTER — CLINICAL SUPPORT (OUTPATIENT)
Dept: REHABILITATION | Facility: HOSPITAL | Age: 78
End: 2023-04-27
Payer: MEDICARE

## 2023-04-27 DIAGNOSIS — R29.898 DECREASED STRENGTH OF LOWER EXTREMITY: Primary | ICD-10-CM

## 2023-04-27 DIAGNOSIS — R29.3 POOR POSTURE: ICD-10-CM

## 2023-04-27 PROCEDURE — 97112 NEUROMUSCULAR REEDUCATION: CPT | Mod: PN

## 2023-04-27 PROCEDURE — 97110 THERAPEUTIC EXERCISES: CPT | Mod: PN

## 2023-04-27 NOTE — PROGRESS NOTES
[facility]   Physical Therapy Treatment Note     Name: Erica Masterson  Clinic Number: [id number]    Therapy Diagnosis:   Encounter Diagnoses   Name Primary?    Decreased strength of lower extremity Yes    Poor posture      Physician: Dustin Cortes MD    Visit Date: 4/27/2023    Physician Orders: PT Eval and Treat  Medical Diagnosis from Referral:   M54.50,G89.29 (ICD-10-CM) - Chronic bilateral low back pain, unspecified whether sciatica present   M54.2 (ICD-10-CM) - Cervicalgia      Evaluation Date: 4/6/2023  Authorization Period Expiration: 5/4/2023  Plan of Care Expiration: 7/30/2023  Progress Note Due: 5/6/2023  Visit # / Visits authorized: 6 / 11  FOTO: 2/3     Precautions: Standard, Diabetes, and cancer      PTA Visit #: 0/5     FOTO first follow up:  4/25/2023   FOTO second follow up:     Time In: 735 AM  Time Out: 815 AM  Total Billable Time: 40 minutes  SUBJECTIVE     Pt reports: she usually sleeps on the R side but last night she slept on the L side and she woke up really tight this morning that she was sweating. Reports about 60% improvement since evaluation.     She was compliant with home exercise program.  Response to previous treatment: positive  Functional change: as noted    Pain: 0 / 1 0  Location: low back    OBJECTIVE     Objective Measures updated at progress report unless specified.     Treatment     Erica received the treatments listed below:      manual therapy techniques: Joint mobilizations and Manual traction were applied to the: R Leg  for 0 minutes, including:    LAD on R LE    therapeutic exercises to develop strength, endurance, and ROM for 30 minutes including:    Open books x 15 repetitions each side   Prone hip extension 2 x 10 each side   Prone press ups 3 x 10   Bridges 20x   +SL hip abduction 2 x 10 each side    NP:   Seated thoracic extensions, 2 x 10   Standing lumbar extensions over table, 2 x 15  Straight leg raises 2 x 10 2# repetitions to  right lower extremity     Erica participated in neuromuscular re-education activities to improve: Coordination, Kinesthetic, Sense, Proprioception, and Posture for 10 minutes. The following activities were included:    +Prone alt UE lifts 3 x 5 reps each side   +Planks on elbows and knees 4 x 20 sec   PPT 15 x 5 sec holds     NP:  Supine sciatic N sliders, x 15 ea   Clams with green theraband 3 x 10 repetitions     Patient Education and Home Exercises     Home Exercises Provided and Patient Education Provided     Education provided:   - Continue HEP     Written Home Exercises Provided: Patient instructed to cont prior HEP. Exercises were reviewed and Erica was able to demonstrate them prior to the end of the session.  Erica demonstrated good  understanding of the education provided. See EMR under Patient Instructions for exercises provided during therapy sessions    ASSESSMENT     Good tolerance to tx session. Added core strengthening today for improving core activation. Also added hip abduction for hip strengthening. She reported feeling much better after tx session. She is progressing well meeting all STGs at this time.     Erica Is progressing well towards her goals.   Pt prognosis is Good.     Pt will continue to benefit from skilled outpatient physical therapy to address the deficits listed in the problem list box on initial evaluation, provide pt/family education and to maximize pt's level of independence in the home and community environment.     Pt's spiritual, cultural and educational needs considered and pt agreeable to plan of care and goals.     Anticipated barriers to physical therapy: co morbidities   Goals:     STG  Weeks/Visits Date Established  Goal Status    1.Patient will report >/= 30% improvement since evaluation to improve ability to carry out IADLS 3 weeks/ 6 visits  4/6/2023    MET 4/27/2023   2. Patient will report </= 3/10 current pain and </= 5/10 for at worst pain to improve quality of life   3 weeks/ 6 visits  4/6/2023    Partially Met 4/27/2023   3.Patient will report decreased RLE stiffness in the morning to improve functional mobility  3 weeks/ 6 visits  4/6/2023    MET   4/25/23     4.Pt will demonstrate and verbalize compliance and independence with HEP to improve therapy outcomes  3 weeks/ 6 visits  4/6/2023    MET   4/25/23     5. Patient will report </= 40% limitation on FOTO to improve activity participation  3 weeks/ 6 visits  4/6/2023    MET   4/27/2023       LTG Weeks/Visits Date Established  Goal Status    1.Patient will report >/= 50% improvement since evaluation to improve ability to carry out IADLS 6 weeks/ 12 visits  4/6/2023 4/27/2023   Progressing    2. Patient will report </= 1/10 current pain and </= 3/10 for at worst pain to improve quality of life  6 weeks/ 12 visits  4/6/2023 4/27/2023   Progressing    3.Patient will report </= 35% limitation on FOTO to improve activity participation  6 weeks/ 12 visits  4/6/2023    MET   4/25/23     4. Patient will increase BLE strength to >/= 4/5 to improve ability to carry out hobbies  6 weeks/ 12 visits  4/6/2023 4/27/2023   Progressing   5.   4/6/2023             PLAN     Continue to treat and progress per POC and pt tolerance      Diamond Hampton, PT

## 2023-05-01 ENCOUNTER — OFFICE VISIT (OUTPATIENT)
Dept: OPHTHALMOLOGY | Facility: CLINIC | Age: 78
End: 2023-05-01
Payer: MEDICARE

## 2023-05-01 ENCOUNTER — CLINICAL SUPPORT (OUTPATIENT)
Dept: OPHTHALMOLOGY | Facility: CLINIC | Age: 78
End: 2023-05-01
Payer: MEDICARE

## 2023-05-01 DIAGNOSIS — H40.033 ANATOMICAL NARROW ANGLE BORDERLINE GLAUCOMA OF BOTH EYES: ICD-10-CM

## 2023-05-01 DIAGNOSIS — H40.013 OPEN ANGLE WITH BORDERLINE FINDINGS AND LOW GLAUCOMA RISK IN BOTH EYES: Primary | ICD-10-CM

## 2023-05-01 DIAGNOSIS — H25.813 COMBINED FORMS OF AGE-RELATED CATARACT, BILATERAL: ICD-10-CM

## 2023-05-01 DIAGNOSIS — E11.9 DIABETES MELLITUS TYPE 2 WITHOUT RETINOPATHY: ICD-10-CM

## 2023-05-01 PROCEDURE — 3288F PR FALLS RISK ASSESSMENT DOCUMENTED: ICD-10-PCS | Mod: CPTII,S$GLB,, | Performed by: OPHTHALMOLOGY

## 2023-05-01 PROCEDURE — 1126F AMNT PAIN NOTED NONE PRSNT: CPT | Mod: CPTII,S$GLB,, | Performed by: OPHTHALMOLOGY

## 2023-05-01 PROCEDURE — 1160F PR REVIEW ALL MEDS BY PRESCRIBER/CLIN PHARMACIST DOCUMENTED: ICD-10-PCS | Mod: CPTII,S$GLB,, | Performed by: OPHTHALMOLOGY

## 2023-05-01 PROCEDURE — 2023F DILAT RTA XM W/O RTNOPTHY: CPT | Mod: CPTII,S$GLB,, | Performed by: OPHTHALMOLOGY

## 2023-05-01 PROCEDURE — 1159F PR MEDICATION LIST DOCUMENTED IN MEDICAL RECORD: ICD-10-PCS | Mod: CPTII,S$GLB,, | Performed by: OPHTHALMOLOGY

## 2023-05-01 PROCEDURE — 2023F PR DILATED RETINAL EXAM W/O EVID OF RETINOPATHY: ICD-10-PCS | Mod: CPTII,S$GLB,, | Performed by: OPHTHALMOLOGY

## 2023-05-01 PROCEDURE — 99999 PR PBB SHADOW E&M-EST. PATIENT-LVL III: ICD-10-PCS | Mod: PBBFAC,,, | Performed by: OPHTHALMOLOGY

## 2023-05-01 PROCEDURE — 92015 PR REFRACTION: ICD-10-PCS | Mod: S$GLB,,, | Performed by: OPHTHALMOLOGY

## 2023-05-01 PROCEDURE — 1101F PR PT FALLS ASSESS DOC 0-1 FALLS W/OUT INJ PAST YR: ICD-10-PCS | Mod: CPTII,S$GLB,, | Performed by: OPHTHALMOLOGY

## 2023-05-01 PROCEDURE — 92015 DETERMINE REFRACTIVE STATE: CPT | Mod: S$GLB,,, | Performed by: OPHTHALMOLOGY

## 2023-05-01 PROCEDURE — 1126F PR PAIN SEVERITY QUANTIFIED, NO PAIN PRESENT: ICD-10-PCS | Mod: CPTII,S$GLB,, | Performed by: OPHTHALMOLOGY

## 2023-05-01 PROCEDURE — 1160F RVW MEDS BY RX/DR IN RCRD: CPT | Mod: CPTII,S$GLB,, | Performed by: OPHTHALMOLOGY

## 2023-05-01 PROCEDURE — 1159F MED LIST DOCD IN RCRD: CPT | Mod: CPTII,S$GLB,, | Performed by: OPHTHALMOLOGY

## 2023-05-01 PROCEDURE — 99999 PR PBB SHADOW E&M-EST. PATIENT-LVL III: CPT | Mod: PBBFAC,,, | Performed by: OPHTHALMOLOGY

## 2023-05-01 PROCEDURE — 1101F PT FALLS ASSESS-DOCD LE1/YR: CPT | Mod: CPTII,S$GLB,, | Performed by: OPHTHALMOLOGY

## 2023-05-01 PROCEDURE — 3288F FALL RISK ASSESSMENT DOCD: CPT | Mod: CPTII,S$GLB,, | Performed by: OPHTHALMOLOGY

## 2023-05-01 PROCEDURE — 92014 COMPRE OPH EXAM EST PT 1/>: CPT | Mod: S$GLB,,, | Performed by: OPHTHALMOLOGY

## 2023-05-01 PROCEDURE — 92014 PR EYE EXAM, EST PATIENT,COMPREHESV: ICD-10-PCS | Mod: S$GLB,,, | Performed by: OPHTHALMOLOGY

## 2023-05-01 NOTE — PROGRESS NOTES
HPI     Follow-up     Additional comments: Here for annual DM exam.  HVF & DFE today. Denies   eye pain today. Using Art Tears gel drops OU HS.   Lab Results       Component                Value               Date                       HGBA1C                   6.1 (H)             01/25/2023                 HGBA1C                   6.0 (H)             08/08/2022                 HGBA1C                   5.8 (H)             04/06/2022                      Last edited by Suze Graves on 5/1/2023  3:20 PM.            Assessment /Plan     For exam results, see Encounter Report.    Open angle with borderline findings and low glaucoma risk in both eyes    Combined forms of age-related cataract, bilateral    Diabetes mellitus type 2 without retinopathy    Dry eye syndrome, bilateral      1. Open angle with borderline findings and low glaucoma risk in both eyes  Mildly thin pachy  +famhx Aunt    Mild increased CDR OD>OS  IOP is normal  HVF essentially normal and stable  OCT normal and stable OU    Very Low risk  F/u 1 year, DFE, OCT NFL    2. Combined forms of age-related cataract, bilateral  Increasing  Pt not yet bothered enough for sx  New glasses rx today    3. Diabetes mellitus type 2 without retinopathy  Diabetes without retinopathy, discussed with patient importance of glucose control and follow up.  Patient voices understanding.

## 2023-05-01 NOTE — PROGRESS NOTES
24-2 HVF done.         Assessment /Plan     For exam results, see Encounter Report.    There are no diagnoses linked to this encounter.

## 2023-05-09 ENCOUNTER — CLINICAL SUPPORT (OUTPATIENT)
Dept: REHABILITATION | Facility: HOSPITAL | Age: 78
End: 2023-05-09
Payer: MEDICARE

## 2023-05-09 ENCOUNTER — OFFICE VISIT (OUTPATIENT)
Dept: CARDIOLOGY | Facility: CLINIC | Age: 78
End: 2023-05-09
Payer: MEDICARE

## 2023-05-09 ENCOUNTER — TELEPHONE (OUTPATIENT)
Dept: PAIN MEDICINE | Facility: CLINIC | Age: 78
End: 2023-05-09
Payer: MEDICARE

## 2023-05-09 ENCOUNTER — OFFICE VISIT (OUTPATIENT)
Dept: SPINE | Facility: CLINIC | Age: 78
End: 2023-05-09
Payer: MEDICARE

## 2023-05-09 VITALS
HEART RATE: 75 BPM | OXYGEN SATURATION: 97 % | BODY MASS INDEX: 26.36 KG/M2 | WEIGHT: 164 LBS | DIASTOLIC BLOOD PRESSURE: 70 MMHG | HEIGHT: 66 IN | SYSTOLIC BLOOD PRESSURE: 122 MMHG

## 2023-05-09 VITALS — HEIGHT: 66 IN | BODY MASS INDEX: 26.05 KG/M2 | WEIGHT: 162.06 LBS

## 2023-05-09 DIAGNOSIS — M54.16 LUMBAR RADICULOPATHY: Primary | ICD-10-CM

## 2023-05-09 DIAGNOSIS — R29.3 POOR POSTURE: ICD-10-CM

## 2023-05-09 DIAGNOSIS — I10 HYPERTENSION, ESSENTIAL: ICD-10-CM

## 2023-05-09 DIAGNOSIS — G89.29 CHRONIC BILATERAL LOW BACK PAIN, UNSPECIFIED WHETHER SCIATICA PRESENT: Primary | ICD-10-CM

## 2023-05-09 DIAGNOSIS — E11.69 HYPERLIPIDEMIA ASSOCIATED WITH TYPE 2 DIABETES MELLITUS: ICD-10-CM

## 2023-05-09 DIAGNOSIS — M54.2 CERVICALGIA: ICD-10-CM

## 2023-05-09 DIAGNOSIS — E11.59 HYPERTENSION ASSOCIATED WITH DIABETES: ICD-10-CM

## 2023-05-09 DIAGNOSIS — R29.898 DECREASED STRENGTH OF LOWER EXTREMITY: Primary | ICD-10-CM

## 2023-05-09 DIAGNOSIS — E78.5 HYPERLIPIDEMIA ASSOCIATED WITH TYPE 2 DIABETES MELLITUS: ICD-10-CM

## 2023-05-09 DIAGNOSIS — M54.50 CHRONIC BILATERAL LOW BACK PAIN, UNSPECIFIED WHETHER SCIATICA PRESENT: Primary | ICD-10-CM

## 2023-05-09 DIAGNOSIS — I15.2 HYPERTENSION ASSOCIATED WITH DIABETES: ICD-10-CM

## 2023-05-09 PROCEDURE — 99213 PR OFFICE/OUTPT VISIT, EST, LEVL III, 20-29 MIN: ICD-10-PCS | Mod: S$GLB,,, | Performed by: PHYSICAL MEDICINE & REHABILITATION

## 2023-05-09 PROCEDURE — 3288F PR FALLS RISK ASSESSMENT DOCUMENTED: ICD-10-PCS | Mod: CPTII,S$GLB,, | Performed by: PHYSICAL MEDICINE & REHABILITATION

## 2023-05-09 PROCEDURE — 1101F PT FALLS ASSESS-DOCD LE1/YR: CPT | Mod: CPTII,S$GLB,, | Performed by: PHYSICAL MEDICINE & REHABILITATION

## 2023-05-09 PROCEDURE — 1160F RVW MEDS BY RX/DR IN RCRD: CPT | Mod: CPTII,S$GLB,, | Performed by: PHYSICAL MEDICINE & REHABILITATION

## 2023-05-09 PROCEDURE — 3074F SYST BP LT 130 MM HG: CPT | Mod: CPTII,S$GLB,, | Performed by: INTERNAL MEDICINE

## 2023-05-09 PROCEDURE — 1159F MED LIST DOCD IN RCRD: CPT | Mod: CPTII,S$GLB,, | Performed by: PHYSICAL MEDICINE & REHABILITATION

## 2023-05-09 PROCEDURE — 1125F AMNT PAIN NOTED PAIN PRSNT: CPT | Mod: CPTII,S$GLB,, | Performed by: PHYSICAL MEDICINE & REHABILITATION

## 2023-05-09 PROCEDURE — 3288F PR FALLS RISK ASSESSMENT DOCUMENTED: ICD-10-PCS | Mod: CPTII,S$GLB,, | Performed by: INTERNAL MEDICINE

## 2023-05-09 PROCEDURE — 1160F PR REVIEW ALL MEDS BY PRESCRIBER/CLIN PHARMACIST DOCUMENTED: ICD-10-PCS | Mod: CPTII,S$GLB,, | Performed by: PHYSICAL MEDICINE & REHABILITATION

## 2023-05-09 PROCEDURE — 3078F DIAST BP <80 MM HG: CPT | Mod: CPTII,S$GLB,, | Performed by: INTERNAL MEDICINE

## 2023-05-09 PROCEDURE — 1159F PR MEDICATION LIST DOCUMENTED IN MEDICAL RECORD: ICD-10-PCS | Mod: CPTII,S$GLB,, | Performed by: PHYSICAL MEDICINE & REHABILITATION

## 2023-05-09 PROCEDURE — 1126F AMNT PAIN NOTED NONE PRSNT: CPT | Mod: CPTII,S$GLB,, | Performed by: INTERNAL MEDICINE

## 2023-05-09 PROCEDURE — 1159F MED LIST DOCD IN RCRD: CPT | Mod: CPTII,S$GLB,, | Performed by: INTERNAL MEDICINE

## 2023-05-09 PROCEDURE — 1101F PT FALLS ASSESS-DOCD LE1/YR: CPT | Mod: CPTII,S$GLB,, | Performed by: INTERNAL MEDICINE

## 2023-05-09 PROCEDURE — 3072F PR LOW RISK FOR RETINOPATHY: ICD-10-PCS | Mod: CPTII,S$GLB,, | Performed by: INTERNAL MEDICINE

## 2023-05-09 PROCEDURE — 1101F PR PT FALLS ASSESS DOC 0-1 FALLS W/OUT INJ PAST YR: ICD-10-PCS | Mod: CPTII,S$GLB,, | Performed by: PHYSICAL MEDICINE & REHABILITATION

## 2023-05-09 PROCEDURE — 3288F FALL RISK ASSESSMENT DOCD: CPT | Mod: CPTII,S$GLB,, | Performed by: PHYSICAL MEDICINE & REHABILITATION

## 2023-05-09 PROCEDURE — 1126F PR PAIN SEVERITY QUANTIFIED, NO PAIN PRESENT: ICD-10-PCS | Mod: CPTII,S$GLB,, | Performed by: INTERNAL MEDICINE

## 2023-05-09 PROCEDURE — 97110 THERAPEUTIC EXERCISES: CPT | Mod: PN,CQ

## 2023-05-09 PROCEDURE — 1160F PR REVIEW ALL MEDS BY PRESCRIBER/CLIN PHARMACIST DOCUMENTED: ICD-10-PCS | Mod: CPTII,S$GLB,, | Performed by: INTERNAL MEDICINE

## 2023-05-09 PROCEDURE — 99214 OFFICE O/P EST MOD 30 MIN: CPT | Mod: S$GLB,,, | Performed by: INTERNAL MEDICINE

## 2023-05-09 PROCEDURE — 3288F FALL RISK ASSESSMENT DOCD: CPT | Mod: CPTII,S$GLB,, | Performed by: INTERNAL MEDICINE

## 2023-05-09 PROCEDURE — 1160F RVW MEDS BY RX/DR IN RCRD: CPT | Mod: CPTII,S$GLB,, | Performed by: INTERNAL MEDICINE

## 2023-05-09 PROCEDURE — 99214 PR OFFICE/OUTPT VISIT, EST, LEVL IV, 30-39 MIN: ICD-10-PCS | Mod: S$GLB,,, | Performed by: INTERNAL MEDICINE

## 2023-05-09 PROCEDURE — 99213 OFFICE O/P EST LOW 20 MIN: CPT | Mod: S$GLB,,, | Performed by: PHYSICAL MEDICINE & REHABILITATION

## 2023-05-09 PROCEDURE — 3072F LOW RISK FOR RETINOPATHY: CPT | Mod: CPTII,S$GLB,, | Performed by: INTERNAL MEDICINE

## 2023-05-09 PROCEDURE — 1101F PR PT FALLS ASSESS DOC 0-1 FALLS W/OUT INJ PAST YR: ICD-10-PCS | Mod: CPTII,S$GLB,, | Performed by: INTERNAL MEDICINE

## 2023-05-09 PROCEDURE — 3072F PR LOW RISK FOR RETINOPATHY: ICD-10-PCS | Mod: CPTII,S$GLB,, | Performed by: PHYSICAL MEDICINE & REHABILITATION

## 2023-05-09 PROCEDURE — 3072F LOW RISK FOR RETINOPATHY: CPT | Mod: CPTII,S$GLB,, | Performed by: PHYSICAL MEDICINE & REHABILITATION

## 2023-05-09 PROCEDURE — 3074F PR MOST RECENT SYSTOLIC BLOOD PRESSURE < 130 MM HG: ICD-10-PCS | Mod: CPTII,S$GLB,, | Performed by: INTERNAL MEDICINE

## 2023-05-09 PROCEDURE — 1125F PR PAIN SEVERITY QUANTIFIED, PAIN PRESENT: ICD-10-PCS | Mod: CPTII,S$GLB,, | Performed by: PHYSICAL MEDICINE & REHABILITATION

## 2023-05-09 PROCEDURE — 3078F PR MOST RECENT DIASTOLIC BLOOD PRESSURE < 80 MM HG: ICD-10-PCS | Mod: CPTII,S$GLB,, | Performed by: INTERNAL MEDICINE

## 2023-05-09 PROCEDURE — 1159F PR MEDICATION LIST DOCUMENTED IN MEDICAL RECORD: ICD-10-PCS | Mod: CPTII,S$GLB,, | Performed by: INTERNAL MEDICINE

## 2023-05-09 RX ORDER — AMLODIPINE BESYLATE 2.5 MG/1
2.5 TABLET ORAL DAILY
Qty: 90 TABLET | Refills: 2 | Status: SHIPPED | OUTPATIENT
Start: 2023-05-09 | End: 2023-10-26

## 2023-05-09 RX ORDER — LOSARTAN POTASSIUM 100 MG/1
100 TABLET ORAL DAILY
Qty: 90 TABLET | Refills: 3 | Status: SHIPPED | OUTPATIENT
Start: 2023-05-09

## 2023-05-09 RX ORDER — ROSUVASTATIN CALCIUM 10 MG/1
10 TABLET, COATED ORAL DAILY
Qty: 90 TABLET | Refills: 4 | Status: SHIPPED | OUTPATIENT
Start: 2023-05-09

## 2023-05-09 NOTE — TELEPHONE ENCOUNTER
----- Message from Dustin Cortes MD sent at 5/9/2023 10:06 AM CDT -----  Please schedule for interlaminar injection L5-S1

## 2023-05-09 NOTE — PROGRESS NOTES
[facility]   Physical Therapy Treatment Note     Name: Erica Masterson  Clinic Number: [id number]    Therapy Diagnosis:   Encounter Diagnoses   Name Primary?    Decreased strength of lower extremity Yes    Poor posture      Physician: Dustin Cortes MD    Visit Date: 5/9/2023    Physician Orders: PT Eval and Treat  Medical Diagnosis from Referral:   M54.50,G89.29 (ICD-10-CM) - Chronic bilateral low back pain, unspecified whether sciatica present   M54.2 (ICD-10-CM) - Cervicalgia      Evaluation Date: 4/6/2023  Authorization Period Expiration: 5/4/2023  Plan of Care Expiration: 7/30/2023  Progress Note Due: 5/6/2023  Visit # / Visits authorized: 7 / 11  FOTO: 2/3     Precautions: Standard, Diabetes, and cancer      PTA Visit #: 1/5     FOTO first follow up:  4/25/2023   FOTO second follow up:     Time In: 0801  Time Out: 0842   Total Billable Time: 30 minutes  SUBJECTIVE     Pt reports: States she woke up this morning with tightness in her hamstrings but no burning pain.     She was compliant with home exercise program.  Response to previous treatment: positive  Functional change: as noted    Pain: 0 / 10  Location: low back    OBJECTIVE     Objective Measures updated at progress report unless specified.     Treatment     Erica received the treatments listed below:      manual therapy techniques: Joint mobilizations and Manual traction were applied to the: R Leg  for 0 minutes, including:    LAD on R LE    therapeutic exercises to develop strength, endurance, and ROM for 31 minutes including:    Open books x 20 repetitions each side   Prone hip extension 2 x 10 each side   Prone press ups 2 x 10   Bridges 20x   SL hip abduction 2 x 10 each side    Erica participated in neuromuscular re-education activities to improve: Coordination, Kinesthetic, Sense, Proprioception, and Posture for 10 minutes. The following activities were included:    Prone alt UE lifts 3 x 5 reps each side   Planks on  elbows and knees 4 x 20 sec   PPT 15 x 5 sec holds     Patient Education and Home Exercises     Home Exercises Provided and Patient Education Provided     Education provided:   - Continue HEP     Written Home Exercises Provided: Patient instructed to cont prior HEP. Exercises were reviewed and Erica was able to demonstrate them prior to the end of the session.  Eriac demonstrated good  understanding of the education provided. See EMR under Patient Instructions for exercises provided during therapy sessions    ASSESSMENT     Presented with B HS tightness and improvement noted after long sitting HS stretch. Patient given core exercises to perform at home to continue to progress core strengthening.     Erica Is progressing well towards her goals.   Pt prognosis is Good.     Pt will continue to benefit from skilled outpatient physical therapy to address the deficits listed in the problem list box on initial evaluation, provide pt/family education and to maximize pt's level of independence in the home and community environment.     Pt's spiritual, cultural and educational needs considered and pt agreeable to plan of care and goals.     Anticipated barriers to physical therapy: co morbidities   Goals:     STG  Weeks/Visits Date Established  Goal Status    1.Patient will report >/= 30% improvement since evaluation to improve ability to carry out IADLS 3 weeks/ 6 visits  4/6/2023    MET 4/27/2023   2. Patient will report </= 3/10 current pain and </= 5/10 for at worst pain to improve quality of life  3 weeks/ 6 visits  4/6/2023    Partially Met 4/27/2023   3.Patient will report decreased RLE stiffness in the morning to improve functional mobility  3 weeks/ 6 visits  4/6/2023    MET   4/25/23     4.Pt will demonstrate and verbalize compliance and independence with HEP to improve therapy outcomes  3 weeks/ 6 visits  4/6/2023    MET   4/25/23     5. Patient will report </= 40% limitation on FOTO to improve activity participation   3 weeks/ 6 visits  4/6/2023    MET   5/9/2023       LTG Weeks/Visits Date Established  Goal Status    1.Patient will report >/= 50% improvement since evaluation to improve ability to carry out IADLS 6 weeks/ 12 visits  4/6/2023 5/9/2023   Progressing    2. Patient will report </= 1/10 current pain and </= 3/10 for at worst pain to improve quality of life  6 weeks/ 12 visits  4/6/2023 5/9/2023   Progressing    3.Patient will report </= 35% limitation on FOTO to improve activity participation  6 weeks/ 12 visits  4/6/2023    MET   4/25/23     4. Patient will increase BLE strength to >/= 4/5 to improve ability to carry out hobbies  6 weeks/ 12 visits  4/6/2023 5/9/2023   Progressing   5.   4/6/2023             PLAN     Continue to treat and progress per POC and pt tolerance      Jimmy Esqueda, PTA

## 2023-05-09 NOTE — PROGRESS NOTES
Patient ID:  Erica Masterson is a 77 y.o. female who presents for follow-up of Peripheral Arterial Disease, Hypertension, and Hyperlipidemia      She has been doing very well.  She had thyroid surgery for cancer and she has been having some neck pains although it is doing better.  She walks 3 times a week without any symptoms whatsoever she does have occasional cramps in her legs.  She denies any chest pains.  She is here mostly for her annual checkup and refill her prescriptions.  She follows up on regular basis with Dr. Myers      Past Medical History:   Diagnosis Date    Adenomatous polyp of colon 3/26/2012    Allergy     Anxiety     Cancer     Cataract     Colon polyp     Degenerative arthritis of knee 04/03/2012    Diverticulosis     Encounter for blood transfusion     GERD (gastroesophageal reflux disease)     History of thyroid cancer 2021    Hyperlipidemia     Hypertension     Lateral meniscal tear 5/20/2013    Multinodular goiter 03/26/2012    Myopathy, unspecified 01/18/2010    Tuberculosis         Past Surgical History:   Procedure Laterality Date    BREAST BIOPSY Left 2015    benign    CHOLECYSTECTOMY      COLONOSCOPY  12/2/15    Dr. Britton, multiple polyps, recheck five years-three years if more than 2 polyps are adenomatous    COLONOSCOPY N/A 12/2/2015    COLONOSCOPY N/A 3/20/2019    Procedure: COLONOSCOPY;  Surgeon: Jose Britton MD;  Location: [facility];  Service: Endoscopy;  Laterality: N/A;    COLONOSCOPY N/A 5/20/2020    Dr. Britton; internal hemorrhoids; diverticulosis; polyps removed; repeat in 3 years    CYSTOSCOPY N/A 8/26/2020    Procedure: CYSTOSCOPY;  Surgeon: Charlotte Walters Jr., MD;  Location: [facility] OR;  Service: Urology;  Laterality: N/A;    DISSECTION OF NECK Left 9/13/2021    Procedure: DISSECTION, NECK;  Surgeon: Ryan Torres MD;  Location: [address];  Service: ENT;  Laterality: Left;    ESOPHAGEAL MANOMETRY WITH MEASUREMENT OF IMPEDANCE N/A 8/22/2022    Procedure:  MANOMETRY, ESOPHAGUS, WITH IMPEDANCE MEASUREMENT;  Surgeon: Pantera Mcguire MD;  Location: [facility] ENDO (4TH FLR);  Service: Endoscopy;  Laterality: N/A;  8/4 fully vaccinated; instructions mailed-st    ESOPHAGOGASTRODUODENOSCOPY N/A 5/20/2020    Dr. Britton; small hiatal hernia; gastritis; 8 gastric polyps removed    ESOPHAGOGASTRODUODENOSCOPY N/A 4/1/2022    Procedure: EGD (ESOPHAGOGASTRODUODENOSCOPY);  Surgeon: Jose Britton MD;  Location: [facility];  Service: Endoscopy;  Laterality: N/A;    FOOT SURGERY      HYSTERECTOMY      TVH/BSO > 20 years    INTRALUMINAL GASTROINTESTINAL TRACT IMAGING VIA CAPSULE N/A 6/4/2020    Procedure: IMAGING PROCEDURE, GI TRACT, INTRALUMINAL, VIA CAPSULE;  Surgeon: Jose Britton MD;  Location: [facility];  Service: Endoscopy;  Laterality: N/A;    KNEE SURGERY  06/06/2013    right knee tear Dr Iverson     LAPAROSCOPIC CHOLECYSTECTOMY N/A 9/17/2020    Procedure: CHOLECYSTECTOMY, LAPAROSCOPIC;  Surgeon: Forrest Veronica MD;  Location: [facility];  Service: General;  Laterality: N/A;    LUNG LOBECTOMY  1966    right middle lobectomy, due to Tb    OOPHORECTOMY      SHOULDER SURGERY      francis     THYROIDECTOMY Bilateral 5/19/2021    Procedure: THYROIDECTOMY;  Surgeon: Jamia Amezquita MD;  Location: [facility] OR 2ND FLR;  Service: General;  Laterality: Bilateral;    THYROIDECTOMY Bilateral 9/13/2021    Procedure: THYROIDECTOMY WITH INTRA-OP PTH;  Surgeon: Ryan Torres MD;  Location: [facility] OR 2ND FLR;  Service: ENT;  Laterality: Bilateral;  NIM tube  Intraop PTH          Current Outpatient Medications   Medication Instructions    amLODIPine (NORVASC) 2.5 mg, Oral, Daily    artificial tears (ISOPTO TEARS) 0.5 % ophthalmic solution 1 drop, As needed (PRN)    blood sugar diagnostic (BLOOD GLUCOSE TEST) Strp True Metrix glucose strips check once daily    blood-glucose meter kit True Metrix glucometer check glucose once daily    calcium carbonate (TUMS) 200 mg calcium (500 mg) chewable tablet  "Chew and swallow 2 tablets (1,000 mg total) by mouth 3 (three) times daily. for 14 days    conjugated estrogens (PREMARIN) vaginal cream Place 0.5 g vaginally once daily AND 0.5 g twice a week.    coQ10 (ubiquinol) 100 mg, Oral, Daily    doxepin (SINEQUAN) 25 mg, Oral, Nightly    ergocalciferol (ERGOCALCIFEROL) 50,000 Units, Oral, Every 30 days    gabapentin (NEURONTIN) 300 mg, Oral, 3 times daily    levothyroxine (SYNTHROID) 75 mcg, Oral, Before breakfast    losartan (COZAAR) 100 mg, Oral, Daily    rosuvastatin (CRESTOR) 10 mg, Oral, Daily        Review of patient's allergies indicates:  No Known Allergies     Review of Systems   Cardiovascular:  Negative for chest pain, dyspnea on exertion and palpitations.   Respiratory:  Negative for cough and shortness of breath.    Musculoskeletal:  Positive for muscle cramps.      Objective:     Vitals:    05/09/23 1433   BP: 122/70   BP Location: Left arm   Patient Position: Sitting   BP Method: Medium (Manual)   Pulse: 75   SpO2: 97%   Weight: 74.4 kg (164 lb)   Height: 5' 6" (1.676 m)       Physical Exam  Vitals and nursing note reviewed.   Constitutional:       Appearance: She is well-developed.   HENT:      Head: Normocephalic and atraumatic.   Eyes:      Conjunctiva/sclera: Conjunctivae normal.   Cardiovascular:      Rate and Rhythm: Normal rate and regular rhythm.      Heart sounds: Normal heart sounds.   Pulmonary:      Effort: Pulmonary effort is normal.      Breath sounds: Normal breath sounds.   Abdominal:      General: Bowel sounds are normal.      Palpations: Abdomen is soft.   Musculoskeletal:         General: Normal range of motion.   Skin:     General: Skin is warm and dry.   Neurological:      Mental Status: She is alert and oriented to person, place, and time.   Psychiatric:         Behavior: Behavior normal.         Thought Content: Thought content normal.         Judgment: Judgment normal.     CMP  Sodium   Date Value Ref Range Status   01/12/2023 140 136 " - 145 mmol/L Final     Potassium   Date Value Ref Range Status   01/12/2023 4.5 3.5 - 5.1 mmol/L Final     Chloride   Date Value Ref Range Status   01/12/2023 104 95 - 110 mmol/L Final     CO2   Date Value Ref Range Status   01/12/2023 29 23 - 29 mmol/L Final     Glucose   Date Value Ref Range Status   01/12/2023 111 (H) 70 - 110 mg/dL Final     BUN   Date Value Ref Range Status   01/12/2023 14 8 - 23 mg/dL Final     Creatinine   Date Value Ref Range Status   01/12/2023 0.9 0.5 - 1.4 mg/dL Final   06/04/2013 0.9 0.5 - 1.4 mg/dL Final     Calcium   Date Value Ref Range Status   01/12/2023 8.9 8.7 - 10.5 mg/dL Final   06/04/2013 9.7 8.7 - 10.5 mg/dL Final     Total Protein   Date Value Ref Range Status   12/20/2022 7.1 6.0 - 8.4 g/dL Final     Albumin   Date Value Ref Range Status   12/20/2022 3.6 3.5 - 5.2 g/dL Final     Total Bilirubin   Date Value Ref Range Status   12/20/2022 0.6 0.1 - 1.0 mg/dL Final     Comment:     For infants and newborns, interpretation of results should be based  on gestational age, weight and in agreement with clinical  observations.    Premature Infant recommended reference ranges:  Up to 24 hours.............<8.0 mg/dL  Up to 48 hours............<12.0 mg/dL  3-5 days..................<15.0 mg/dL  6-29 days.................<15.0 mg/dL       Alkaline Phosphatase   Date Value Ref Range Status   12/20/2022 48 (L) 55 - 135 U/L Final     AST   Date Value Ref Range Status   12/20/2022 17 10 - 40 U/L Final     ALT   Date Value Ref Range Status   12/20/2022 24 10 - 44 U/L Final     Anion Gap   Date Value Ref Range Status   01/12/2023 7 (L) 8 - 16 mmol/L Final   06/04/2013 9 5 - 15 meq/L Final     eGFR if    Date Value Ref Range Status   07/19/2022 56 (A) >60 mL/min/1.73 m^2 Final     eGFR if non    Date Value Ref Range Status   07/19/2022 48 (A) >60 mL/min/1.73 m^2 Final     Comment:     Calculation used to obtain the estimated glomerular filtration  rate (eGFR) is  the CKD-EPI equation.         BMP  Lab Results   Component Value Date     01/12/2023    K 4.5 01/12/2023     01/12/2023    CO2 29 01/12/2023    BUN 14 01/12/2023    CREATININE 0.9 01/12/2023    CALCIUM 8.9 01/12/2023    ANIONGAP 7 (L) 01/12/2023    ESTGFRAFRICA 56 (A) 07/19/2022    EGFRNONAA 48 (A) 07/19/2022      BNP  @LABRCNTIP(BNP,BNPTRIAGEBLO)@   Lab Results   Component Value Date    CHOL 154 08/08/2022    CHOL 177 04/06/2022    CHOL 163 10/30/2020     Lab Results   Component Value Date    HDL 59 08/08/2022    HDL 70 04/06/2022    HDL 63 10/30/2020     Lab Results   Component Value Date    LDLCALC 86.4 08/08/2022    LDLCALC 97.4 04/06/2022    LDLCALC 91.6 10/30/2020     Lab Results   Component Value Date    TRIG 43 08/08/2022    TRIG 48 04/06/2022    TRIG 42 10/30/2020     Lab Results   Component Value Date    CHOLHDL 38.3 08/08/2022    CHOLHDL 39.5 04/06/2022    CHOLHDL 38.7 10/30/2020      Lab Results   Component Value Date    TSH 3.263 12/20/2022    FREET4 0.93 12/20/2022     Lab Results   Component Value Date    HGBA1C 6.1 (H) 01/25/2023     Lab Results   Component Value Date    WBC 4.50 11/09/2022    HGB 12.2 11/09/2022    HCT 39.0 11/09/2022    MCV 92 11/09/2022     11/09/2022         Results for orders placed during the hospital encounter of 08/06/22    Echo Saline Bubble? Yes    Interpretation Summary  · There is no evidence of intracardiac shunting.  · The left ventricle is normal in size with concentric remodeling and normal systolic function.  · The estimated ejection fraction is 65%.  · Grade I left ventricular diastolic dysfunction.  · Normal right ventricular size with normal right ventricular systolic function.  · Normal central venous pressure (3 mmHg).  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         Assessment:       Hyperlipidemia associated with type 2 diabetes mellitus,  baseline   Fair control on rosuvastatin    Hypertension associated with diabetes  Controlled on amlodipine 2.5, losartan 100 mg,        Plan:       Continue the current medical therapy.  Follow-up with her primary care physician Dr. Myers.  She will be seen in the office in 1 year.

## 2023-05-09 NOTE — PROGRESS NOTES
SUBJECTIVE:    Patient ID: Erica Masterson is a 77 y.o. female.    Chief Complaint: Follow-up (PT follow up)    She presents today for follow-up having completed her course of physical therapy for neck and low back pain.  She does find physical therapy beneficial however she is still not satisfied with her quality of life.  Her primary complaint is of low back pain at the lumbosacral junction radiates into the posterior portion of the right leg and into the calf.  Secondary complaint of posterior neck discomfort.  She is also complaining of some mid to lower thoracic pain.  All of these are familiar symptoms.  Her pain level is 5/10        Past Medical History:   Diagnosis Date    Adenomatous polyp of colon 3/26/2012    Allergy     Anxiety     Cancer     Cataract     Colon polyp     Degenerative arthritis of knee 04/03/2012    Diverticulosis     Encounter for blood transfusion     GERD (gastroesophageal reflux disease)     History of thyroid cancer 2021    Hyperlipidemia     Hypertension     Lateral meniscal tear 5/20/2013    Multinodular goiter 03/26/2012    Myopathy, unspecified 01/18/2010    Tuberculosis      Social History     Socioeconomic History    Marital status:    Tobacco Use    Smoking status: Never    Smokeless tobacco: Never   Substance and Sexual Activity    Alcohol use: Not Currently     Comment: stopped 2019    Drug use: No    Sexual activity: Not Currently     Social Determinants of Health     Financial Resource Strain: Low Risk     Difficulty of Paying Living Expenses: Not hard at all   Food Insecurity: No Food Insecurity    Worried About Running Out of Food in the Last Year: Never true    Ran Out of Food in the Last Year: Never true   Transportation Needs: No Transportation Needs    Lack of Transportation (Medical): No    Lack of Transportation (Non-Medical): No   Physical Activity: Sufficiently Active    Days of Exercise per Week: 3 days    Minutes of Exercise per Session: 60 min    Stress: No Stress Concern Present    Feeling of Stress : Not at all   Social Connections: Moderately Integrated    Frequency of Communication with Friends and Family: Three times a week    Frequency of Social Gatherings with Friends and Family: Three times a week    Attends [employer] Services: More than 4 times per year    Active Member of Clubs or Organizations: No    Attends Club or Organization Meetings: Never    Marital Status:    Housing Stability: Low Risk     Unable to Pay for Housing in the Last Year: No    Number of Places Lived in the Last Year: 1    Unstable Housing in the Last Year: No     Past Surgical History:   Procedure Laterality Date    BREAST BIOPSY Left 2015    benign    CHOLECYSTECTOMY      COLONOSCOPY  12/2/15    Dr. Britton, multiple polyps, recheck five years-three years if more than 2 polyps are adenomatous    COLONOSCOPY N/A 12/2/2015    COLONOSCOPY N/A 3/20/2019    Procedure: COLONOSCOPY;  Surgeon: Jose Britton MD;  Location: [facility];  Service: Endoscopy;  Laterality: N/A;    COLONOSCOPY N/A 5/20/2020    Dr. Britton; internal hemorrhoids; diverticulosis; polyps removed; repeat in 3 years    CYSTOSCOPY N/A 8/26/2020    Procedure: CYSTOSCOPY;  Surgeon: Charlotte Walters Jr., MD;  Location: [facility];  Service: Urology;  Laterality: N/A;    DISSECTION OF NECK Left 9/13/2021    Procedure: DISSECTION, NECK;  Surgeon: Ryan Torres MD;  [address];  Service: ENT;  Laterality: Left;    ESOPHAGEAL MANOMETRY WITH MEASUREMENT OF IMPEDANCE N/A 8/22/2022    Procedure: MANOMETRY, ESOPHAGUS, WITH IMPEDANCE MEASUREMENT;  Surgeon: Pantera Mcguire MD;  Location: [facility] (4TH FLR);  Service: Endoscopy;  Laterality: N/A;  8/4 fully vaccinated; instructions mailed-st    ESOPHAGOGASTRODUODENOSCOPY N/A 5/20/2020    Dr. Britton; small hiatal hernia; gastritis; 8 gastric polyps removed    ESOPHAGOGASTRODUODENOSCOPY N/A 4/1/2022    Procedure: EGD (ESOPHAGOGASTRODUODENOSCOPY);  Surgeon:  Jose Britton MD;  Location: [facility] ENDO;  Service: Endoscopy;  Laterality: N/A;    FOOT SURGERY      HYSTERECTOMY      TVH/BSO > 20 years    INTRALUMINAL GASTROINTESTINAL TRACT IMAGING VIA CAPSULE N/A 6/4/2020    Procedure: IMAGING PROCEDURE, GI TRACT, INTRALUMINAL, VIA CAPSULE;  Surgeon: Jose Britton MD;  Location: [facility] ENDO;  Service: Endoscopy;  Laterality: N/A;    KNEE SURGERY  06/06/2013    right knee tear Dr Iverson     LAPAROSCOPIC CHOLECYSTECTOMY N/A 9/17/2020    Procedure: CHOLECYSTECTOMY, LAPAROSCOPIC;  Surgeon: Forrest Veronica MD;  Location: [facility] OR;  Service: General;  Laterality: N/A;    LUNG LOBECTOMY  1966    right middle lobectomy, due to Tb    OOPHORECTOMY      SHOULDER SURGERY      francis     THYROIDECTOMY Bilateral 5/19/2021    Procedure: THYROIDECTOMY;  Surgeon: Jamia Amezquita MD;  Location: [facility] OR 2ND FLR;  Service: General;  Laterality: Bilateral;    THYROIDECTOMY Bilateral 9/13/2021    Procedure: THYROIDECTOMY WITH INTRA-OP PTH;  Surgeon: Ryan Torres MD;  Location: [facility] OR 2ND FLR;  Service: ENT;  Laterality: Bilateral;  NIM tube  Intraop PTH     Family History   Problem Relation Age of Onset    Colon cancer Other     Cancer Mother     Hypertension Mother     Hyperlipidemia Mother     Cancer Brother     Hypertension Father     Emphysema Father     Diabetes Son     Hypertension Son     Alcohol abuse Son     Diabetes Maternal Aunt     Alzheimer's disease Maternal Uncle     Cancer Maternal Grandmother     No Known Problems Daughter     Dementia Sister     Alzheimer's disease Sister     Breast cancer Sister 60    Obesity Paternal Uncle     Prostate cancer Other     Cancer Brother     Melanoma Neg Hx     Psoriasis Neg Hx     Lupus Neg Hx     Eczema Neg Hx     Amblyopia Neg Hx     Blindness Neg Hx     Cataracts Neg Hx     Glaucoma Neg Hx     Macular degeneration Neg Hx     Retinal detachment Neg Hx     Strabismus Neg Hx     Stroke Neg Hx     Thyroid cancer Neg Hx     Colon polyps  "Neg Hx     Ulcerative colitis Neg Hx     Stomach cancer Neg Hx     Rectal cancer Neg Hx      Vitals:    05/09/23 0953   Weight: 73.5 kg (162 lb 0.6 oz)   Height: 5' 6" (1.676 m)       Review of Systems   Constitutional:  Negative for chills, diaphoresis, fatigue, fever and unexpected weight change.   HENT:  Negative for trouble swallowing.    Eyes:  Negative for visual disturbance.   Respiratory:  Negative for shortness of breath.    Cardiovascular:  Negative for chest pain.   Gastrointestinal:  Negative for abdominal pain, constipation, nausea and vomiting.   Genitourinary:  Negative for difficulty urinating.   Musculoskeletal:  Negative for arthralgias, back pain, gait problem, joint swelling, myalgias, neck pain and neck stiffness.   Neurological:  Negative for dizziness, speech difficulty, weakness, light-headedness, numbness and headaches.        Objective:      Physical Exam  Neurological:      Mental Status: She is alert and oriented to person, place, and time.           Assessment:       1. Chronic bilateral low back pain, unspecified whether sciatica present    2. Cervicalgia           Plan:     Improved but still not satisfied with her quality of life with regards to ongoing neck and low back pain.  As previously discussed we will move on to interlaminar injections at L5-S1.  Consider interlaminar injection C7-T1.  Follow-up with me after the procedure      Chronic bilateral low back pain, unspecified whether sciatica present    Cervicalgia          "

## 2023-05-12 ENCOUNTER — CLINICAL SUPPORT (OUTPATIENT)
Dept: REHABILITATION | Facility: HOSPITAL | Age: 78
End: 2023-05-12
Payer: MEDICARE

## 2023-05-12 DIAGNOSIS — R29.898 DECREASED STRENGTH OF LOWER EXTREMITY: Primary | ICD-10-CM

## 2023-05-12 DIAGNOSIS — R29.3 POOR POSTURE: ICD-10-CM

## 2023-05-12 PROCEDURE — 97110 THERAPEUTIC EXERCISES: CPT | Mod: PN

## 2023-05-12 PROCEDURE — 97530 THERAPEUTIC ACTIVITIES: CPT | Mod: PN

## 2023-05-12 NOTE — PROGRESS NOTES
[facility]   Physical Therapy Treatment Note     Name: Erica Masterson  Clinic Number: [id number]    Therapy Diagnosis:   Encounter Diagnoses   Name Primary?    Decreased strength of lower extremity Yes    Poor posture      Physician: Dustin Cortes MD    Visit Date: 5/12/2023    Physician Orders: PT Eval and Treat  Medical Diagnosis from Referral:   M54.50,G89.29 (ICD-10-CM) - Chronic bilateral low back pain, unspecified whether sciatica present   M54.2 (ICD-10-CM) - Cervicalgia      Evaluation Date: 4/6/2023  Authorization Period Expiration: 5/4/2023  Plan of Care Expiration: 7/30/2023  Progress Note Due: 5/6/2023  Visit # / Visits authorized: 8 / 11  FOTO: 2/3     Precautions: Standard, Diabetes, and cancer      PTA Visit #: 0/5     FOTO first follow up:  4/25/2023   FOTO second follow up:     Time In:  803 AM  Time Out: 847 AM  Total Billable Time: 44 minutes  SUBJECTIVE     Pt reports: about 45% improvement since evaluation. Reports about 6/10 pain in the morning and then she will do some stretching and the pain eases the pain to 4/10 pain.     She was compliant with home exercise program.  Response to previous treatment: positive  Functional change: as noted    Pain: 0 / 10  Location: low back    OBJECTIVE     Objective Measures updated at progress report unless specified.     Treatment     Erica received the treatments listed below:      manual therapy techniques: Joint mobilizations and Manual traction were applied to the: R Leg  for 0 minutes, including:    LAD on R LE    therapeutic exercises to develop strength, endurance, and ROM for 29 minutes including:    Nu step x 5 min   Standing extensions at counter 15x   Prone hip extension knee flexion 2 x 8 reps   Open books x 20 repetitions each side - NP   Prone hip extension 2 x 10 each side with knee flexion    Cues for proper glut activation   Prone press ups 2 x 10 - NP  Bridges 20x with abduction band green TB  SL hip  abduction 3 x 8 reps   Require max tactile cues to maintain proper form    Erica participated in neuromuscular re-education activities to improve: Coordination, Kinesthetic, Sense, Proprioception, and Posture for 0 minutes. The following activities were included:    Prone alt UE lifts 3 x 5 reps each side   Planks on elbows and knees 4 x 20 sec   PPT 15 x 5 sec holds     Erica participated in dynamic functional therapeutic activities to improve functional performance for 15  minutes, including:    Lateral stepping red TB at ankles 5 x 10 ft each way   Hip hinge with dowel along spine to 24 inch step 15x   Standing DL dead lift 12 lb dowel 15x  Standing DL deadlift 12 lb dowel with row 15x    Patient Education and Home Exercises     Home Exercises Provided and Patient Education Provided     Education provided:   - Continue HEP     Written Home Exercises Provided: Patient instructed to cont prior HEP. Exercises were reviewed and Erica was able to demonstrate them prior to the end of the session.  Erica demonstrated good  understanding of the education provided. See EMR under Patient Instructions for exercises provided during therapy sessions    ASSESSMENT     Good tolerance to tx session. Patient without complaints of pain throughout tx session. She demonstrates 3-/5 for strength for hip abductors requiring max tactile cues for proper form for proper activation of hip abductors. Instructed pt to perform standing hip abduction at home due to compensation of hip flexor in sidelying. Good tolerance to hip hinge and standing deadlift without complaints of back pain. She is progressing well and continues to respond well to exercise and movement.     Erica Is progressing well towards her goals.   Pt prognosis is Good.     Pt will continue to benefit from skilled outpatient physical therapy to address the deficits listed in the problem list box on initial evaluation, provide pt/family education and to maximize pt's level of  independence in the home and community environment.     Pt's spiritual, cultural and educational needs considered and pt agreeable to plan of care and goals.     Anticipated barriers to physical therapy: co morbidities   Goals:     STG  Weeks/Visits Date Established  Goal Status    1.Patient will report >/= 30% improvement since evaluation to improve ability to carry out IADLS 3 weeks/ 6 visits  4/6/2023    MET 4/27/2023   2. Patient will report </= 3/10 current pain and </= 5/10 for at worst pain to improve quality of life  3 weeks/ 6 visits  4/6/2023    Partially Met 4/27/2023   3.Patient will report decreased RLE stiffness in the morning to improve functional mobility  3 weeks/ 6 visits  4/6/2023    MET   4/25/23     4.Pt will demonstrate and verbalize compliance and independence with HEP to improve therapy outcomes  3 weeks/ 6 visits  4/6/2023    MET   4/25/23     5. Patient will report </= 40% limitation on FOTO to improve activity participation  3 weeks/ 6 visits  4/6/2023    MET   5/12/2023       LTG Weeks/Visits Date Established  Goal Status    1.Patient will report >/= 50% improvement since evaluation to improve ability to carry out IADLS 6 weeks/ 12 visits  4/6/2023 5/12/2023   Progressing    2. Patient will report </= 1/10 current pain and </= 3/10 for at worst pain to improve quality of life  6 weeks/ 12 visits  4/6/2023       Partially met 5/12/2023   3.Patient will report </= 35% limitation on FOTO to improve activity participation  6 weeks/ 12 visits  4/6/2023    MET   4/25/23     4. Patient will increase BLE strength to >/= 4/5 to improve ability to carry out hobbies  6 weeks/ 12 visits  4/6/2023 5/12/2023   Progressing   5.   4/6/2023             PLAN     Continue to treat and progress per POC and pt tolerance      Diamond Hampton, PT

## 2023-05-19 ENCOUNTER — CLINICAL SUPPORT (OUTPATIENT)
Dept: REHABILITATION | Facility: HOSPITAL | Age: 78
End: 2023-05-19
Payer: MEDICARE

## 2023-05-19 DIAGNOSIS — R29.3 POOR POSTURE: ICD-10-CM

## 2023-05-19 DIAGNOSIS — R29.898 DECREASED STRENGTH OF LOWER EXTREMITY: Primary | ICD-10-CM

## 2023-05-19 PROCEDURE — 97112 NEUROMUSCULAR REEDUCATION: CPT | Mod: PN

## 2023-05-19 PROCEDURE — 97110 THERAPEUTIC EXERCISES: CPT | Mod: PN

## 2023-05-19 NOTE — PLAN OF CARE
[facility]  5/19/2023 Discharge Note    Name: Erica Masterson  Clinic Number: [id number]    Therapy Diagnosis:   Encounter Diagnoses   Name Primary?    Decreased strength of lower extremity Yes    Poor posture      Physician: Dustin Cortes MD       Physician Orders: PT Eval and Treat  Medical Diagnosis from Referral:   M54.50,G89.29 (ICD-10-CM) - Chronic bilateral low back pain, unspecified whether sciatica present   M54.2 (ICD-10-CM) - Cervicalgia      Evaluation Date: 4/6/2023      Date of Last visit: 5/19/2023  Total Visits Received: 10    ASSESSMENT      Patient is being discharged from skilled Physical Therapist services at this time 2/2 meeting STG and LTG as well as reaching maximum potential at this time. She is independent and compliant with her HEP therefore is expected to continue to improve. She responds well to extension demonstrating directional preference for extension. She demonstrates improvement LE strength and is active in senior exercise classes. She has made great progress in therapy as evident by subjective and objective measures.     Discharge reason: Patient has met all of his/her goals and Patient has reached the maximum rehab potential for the present time    Discharge FOTO Score: 65/100     Goals:     STG  Weeks/Visits Date Established  Goal Status    1.Patient will report >/= 30% improvement since evaluation to improve ability to carry out IADLS 3 weeks/ 6 visits  4/6/2023    MET 4/27/2023   2. Patient will report </= 3/10 current pain and </= 5/10 for at worst pain to improve quality of life  3 weeks/ 6 visits  4/6/2023    MET 5/19/2023   3.Patient will report decreased RLE stiffness in the morning to improve functional mobility  3 weeks/ 6 visits  4/6/2023    MET   4/25/23     4.Pt will demonstrate and verbalize compliance and independence with HEP to improve therapy outcomes  3 weeks/ 6 visits  4/6/2023    MET   4/25/23     5. Patient will report </= 40%  limitation on FOTO to improve activity participation  3 weeks/ 6 visits  4/6/2023    MET   5/19/2023       LTG Weeks/Visits Date Established  Goal Status    1.Patient will report >/= 50% improvement since evaluation to improve ability to carry out IADLS 6 weeks/ 12 visits  4/6/2023    MET 5/19/2023   2. Patient will report </= 1/10 current pain and </= 3/10 for at worst pain to improve quality of life  6 weeks/ 12 visits  4/6/2023       Partially met 5/19/2023   3.Patient will report </= 35% limitation on FOTO to improve activity participation  6 weeks/ 12 visits  4/6/2023    MET   4/25/23     4. Patient will increase BLE strength to >/= 4/5 to improve ability to carry out hobbies  6 weeks/ 12 visits  4/6/2023    MET 5/1/9/2023   5.   4/6/2023               PLAN   This patient is discharged from Physical Therapy      Diamond Hampton, PT

## 2023-05-19 NOTE — PROGRESS NOTES
[facility]   Physical Therapy Treatment Note     Name: Erica Masterson  Clinic Number: [id number]    Therapy Diagnosis:   Encounter Diagnoses   Name Primary?    Decreased strength of lower extremity Yes    Poor posture      Physician: Dustin Cortes MD    Visit Date: 5/19/2023    Physician Orders: PT Eval and Treat  Medical Diagnosis from Referral:   M54.50,G89.29 (ICD-10-CM) - Chronic bilateral low back pain, unspecified whether sciatica present   M54.2 (ICD-10-CM) - Cervicalgia      Evaluation Date: 4/6/2023  Authorization Period Expiration: 5/4/2023  Plan of Care Expiration: 7/30/2023  Progress Note Due: 5/6/2023  Visit # / Visits authorized: 9 / 11  FOTO: 2/3     Precautions: Standard, Diabetes, and cancer      PTA Visit #: 0/5     FOTO first follow up:  4/25/2023   FOTO second follow up:     Time In:  805 AM  Time Out: 845 AM  Total Billable Time: 40 minutes  SUBJECTIVE     Pt reports: she is complaint with her HEP about every other day. She reports about > 50% improvement since evaluation. Reports at worst pain of 5/10 in the morning and this gets better as the day goes on. She reports good progress with therapy. Denies functional limitations.     She was compliant with home exercise program.  Response to previous treatment: positive  Functional change: as noted    Pain: 0 / 10  Location: low back    OBJECTIVE     MMT:    Hip flexion: 5/5   Hip extension: 4+/5   Hip ABD: 4+/5   Hip adduction: 4/5   Hip ER: 4+/5   Hip IR: 5/5    Treatment     Erica received the treatments listed below:      therapeutic exercises to develop strength, endurance, and ROM for 32 minutes including:    Testing above   Prone press ups 10x  Prone alternating UE LE lifts 2 x 10 each UE   Prone hip extension 10x  Open books x 10 repetitions each side  Prone hip extension 10 each side    Bridges 20x  SL hip abduction 2 x 10   SL ER clams red TB 2 x 10 each side     Erica participated in neuromuscular  re-education activities to improve: Coordination, Kinesthetic, Sense, Proprioception, and Posture for 0 minutes. The following activities were included:    Prone alt UE lifts 3 x 5 reps each side   Planks on elbows and knees 4 x 20 sec   PPT 15 x 5 sec holds     Erica participated in dynamic functional therapeutic activities to improve functional performance for 8 minutes, including:    Lateral stepping red TB at ankles 5 x 10 ft each way   Shuttle 50 lbs 3 x 10     Patient Education and Home Exercises     Home Exercises Provided and Patient Education Provided     Education provided:   - Continue HEP     Written Home Exercises Provided: Patient instructed to cont prior HEP. Exercises were reviewed and Erica was able to demonstrate them prior to the end of the session.  Erica demonstrated good  understanding of the education provided. See EMR under Patient Instructions for exercises provided during therapy sessions    ASSESSMENT   See discharge note.     PLAN     See discharge note.      Diamond Hampton, PT

## 2023-05-22 ENCOUNTER — LAB VISIT (OUTPATIENT)
Dept: LAB | Facility: HOSPITAL | Age: 78
End: 2023-05-22
Attending: INTERNAL MEDICINE
Payer: MEDICARE

## 2023-05-22 DIAGNOSIS — E89.0 POST-SURGICAL HYPOTHYROIDISM: ICD-10-CM

## 2023-05-22 DIAGNOSIS — E83.51 HYPOCALCEMIA: ICD-10-CM

## 2023-05-22 DIAGNOSIS — C73 MALIGNANT NEOPLASM OF THYROID GLAND: ICD-10-CM

## 2023-05-22 LAB
25(OH)D3+25(OH)D2 SERPL-MCNC: 29 NG/ML (ref 30–96)
ALBUMIN SERPL BCP-MCNC: 4.1 G/DL (ref 3.5–5.2)
ALP SERPL-CCNC: 45 U/L (ref 55–135)
ALT SERPL W/O P-5'-P-CCNC: 18 U/L (ref 10–44)
ANION GAP SERPL CALC-SCNC: 10 MMOL/L (ref 8–16)
AST SERPL-CCNC: 22 U/L (ref 10–40)
BILIRUB SERPL-MCNC: 0.5 MG/DL (ref 0.1–1)
BUN SERPL-MCNC: 16 MG/DL (ref 8–23)
CA-I BLDV-SCNC: 1.1 MMOL/L (ref 1.06–1.42)
CALCIUM SERPL-MCNC: 8.6 MG/DL (ref 8.7–10.5)
CHLORIDE SERPL-SCNC: 104 MMOL/L (ref 95–110)
CO2 SERPL-SCNC: 27 MMOL/L (ref 23–29)
CREAT SERPL-MCNC: 1.1 MG/DL (ref 0.5–1.4)
EST. GFR  (NO RACE VARIABLE): 51 ML/MIN/1.73 M^2
GLUCOSE SERPL-MCNC: 117 MG/DL (ref 70–110)
POTASSIUM SERPL-SCNC: 3.9 MMOL/L (ref 3.5–5.1)
PROT SERPL-MCNC: 7.8 G/DL (ref 6–8.4)
PTH-INTACT SERPL-MCNC: 41 PG/ML (ref 9–77)
SODIUM SERPL-SCNC: 141 MMOL/L (ref 136–145)
T4 FREE SERPL-MCNC: 1.09 NG/DL (ref 0.71–1.51)
TSH SERPL DL<=0.005 MIU/L-ACNC: 3.55 UIU/ML (ref 0.4–4)

## 2023-05-22 PROCEDURE — 84439 ASSAY OF FREE THYROXINE: CPT | Performed by: INTERNAL MEDICINE

## 2023-05-22 PROCEDURE — 80053 COMPREHEN METABOLIC PANEL: CPT | Performed by: INTERNAL MEDICINE

## 2023-05-22 PROCEDURE — 83970 ASSAY OF PARATHORMONE: CPT | Performed by: INTERNAL MEDICINE

## 2023-05-22 PROCEDURE — 82306 VITAMIN D 25 HYDROXY: CPT | Performed by: INTERNAL MEDICINE

## 2023-05-22 PROCEDURE — 82330 ASSAY OF CALCIUM: CPT | Performed by: INTERNAL MEDICINE

## 2023-05-22 PROCEDURE — 84443 ASSAY THYROID STIM HORMONE: CPT | Performed by: INTERNAL MEDICINE

## 2023-05-22 PROCEDURE — 84432 ASSAY OF THYROGLOBULIN: CPT | Performed by: INTERNAL MEDICINE

## 2023-05-23 DIAGNOSIS — C73 MALIGNANT NEOPLASM OF THYROID GLAND: Primary | ICD-10-CM

## 2023-05-23 LAB
THRYOGLOBULIN INTERPRETATION: ABNORMAL
THYROGLOB AB SERPL-ACNC: <1.8 IU/ML
THYROGLOB SERPL-MCNC: 16 NG/ML

## 2023-05-30 ENCOUNTER — OFFICE VISIT (OUTPATIENT)
Dept: ENDOCRINOLOGY | Facility: CLINIC | Age: 78
End: 2023-05-30
Payer: MEDICARE

## 2023-05-30 VITALS
BODY MASS INDEX: 25.71 KG/M2 | SYSTOLIC BLOOD PRESSURE: 120 MMHG | TEMPERATURE: 98 F | WEIGHT: 160 LBS | HEART RATE: 80 BPM | OXYGEN SATURATION: 98 % | HEIGHT: 66 IN | DIASTOLIC BLOOD PRESSURE: 70 MMHG

## 2023-05-30 DIAGNOSIS — E89.0 POSTOPERATIVE HYPOTHYROIDISM: Chronic | ICD-10-CM

## 2023-05-30 DIAGNOSIS — M85.88 OSTEOPENIA OF LUMBAR SPINE: ICD-10-CM

## 2023-05-30 DIAGNOSIS — C73 THYROID CANCER: Primary | Chronic | ICD-10-CM

## 2023-05-30 DIAGNOSIS — E89.0 POST-SURGICAL HYPOTHYROIDISM: ICD-10-CM

## 2023-05-30 PROCEDURE — 3288F PR FALLS RISK ASSESSMENT DOCUMENTED: ICD-10-PCS | Mod: CPTII,S$GLB,, | Performed by: INTERNAL MEDICINE

## 2023-05-30 PROCEDURE — 1101F PT FALLS ASSESS-DOCD LE1/YR: CPT | Mod: CPTII,S$GLB,, | Performed by: INTERNAL MEDICINE

## 2023-05-30 PROCEDURE — 3072F LOW RISK FOR RETINOPATHY: CPT | Mod: CPTII,S$GLB,, | Performed by: INTERNAL MEDICINE

## 2023-05-30 PROCEDURE — 1159F PR MEDICATION LIST DOCUMENTED IN MEDICAL RECORD: ICD-10-PCS | Mod: CPTII,S$GLB,, | Performed by: INTERNAL MEDICINE

## 2023-05-30 PROCEDURE — 3072F PR LOW RISK FOR RETINOPATHY: ICD-10-PCS | Mod: CPTII,S$GLB,, | Performed by: INTERNAL MEDICINE

## 2023-05-30 PROCEDURE — 1160F PR REVIEW ALL MEDS BY PRESCRIBER/CLIN PHARMACIST DOCUMENTED: ICD-10-PCS | Mod: CPTII,S$GLB,, | Performed by: INTERNAL MEDICINE

## 2023-05-30 PROCEDURE — 99214 OFFICE O/P EST MOD 30 MIN: CPT | Mod: S$GLB,,, | Performed by: INTERNAL MEDICINE

## 2023-05-30 PROCEDURE — 99999 PR PBB SHADOW E&M-EST. PATIENT-LVL IV: CPT | Mod: PBBFAC,,, | Performed by: INTERNAL MEDICINE

## 2023-05-30 PROCEDURE — 1159F MED LIST DOCD IN RCRD: CPT | Mod: CPTII,S$GLB,, | Performed by: INTERNAL MEDICINE

## 2023-05-30 PROCEDURE — 3074F SYST BP LT 130 MM HG: CPT | Mod: CPTII,S$GLB,, | Performed by: INTERNAL MEDICINE

## 2023-05-30 PROCEDURE — 1125F AMNT PAIN NOTED PAIN PRSNT: CPT | Mod: CPTII,S$GLB,, | Performed by: INTERNAL MEDICINE

## 2023-05-30 PROCEDURE — 3078F PR MOST RECENT DIASTOLIC BLOOD PRESSURE < 80 MM HG: ICD-10-PCS | Mod: CPTII,S$GLB,, | Performed by: INTERNAL MEDICINE

## 2023-05-30 PROCEDURE — 99999 PR PBB SHADOW E&M-EST. PATIENT-LVL IV: ICD-10-PCS | Mod: PBBFAC,,, | Performed by: INTERNAL MEDICINE

## 2023-05-30 PROCEDURE — 3078F DIAST BP <80 MM HG: CPT | Mod: CPTII,S$GLB,, | Performed by: INTERNAL MEDICINE

## 2023-05-30 PROCEDURE — 99214 PR OFFICE/OUTPT VISIT, EST, LEVL IV, 30-39 MIN: ICD-10-PCS | Mod: S$GLB,,, | Performed by: INTERNAL MEDICINE

## 2023-05-30 PROCEDURE — 1101F PR PT FALLS ASSESS DOC 0-1 FALLS W/OUT INJ PAST YR: ICD-10-PCS | Mod: CPTII,S$GLB,, | Performed by: INTERNAL MEDICINE

## 2023-05-30 PROCEDURE — 1125F PR PAIN SEVERITY QUANTIFIED, PAIN PRESENT: ICD-10-PCS | Mod: CPTII,S$GLB,, | Performed by: INTERNAL MEDICINE

## 2023-05-30 PROCEDURE — 1160F RVW MEDS BY RX/DR IN RCRD: CPT | Mod: CPTII,S$GLB,, | Performed by: INTERNAL MEDICINE

## 2023-05-30 PROCEDURE — 3074F PR MOST RECENT SYSTOLIC BLOOD PRESSURE < 130 MM HG: ICD-10-PCS | Mod: CPTII,S$GLB,, | Performed by: INTERNAL MEDICINE

## 2023-05-30 PROCEDURE — 3288F FALL RISK ASSESSMENT DOCD: CPT | Mod: CPTII,S$GLB,, | Performed by: INTERNAL MEDICINE

## 2023-05-30 RX ORDER — LEVOTHYROXINE SODIUM 75 UG/1
75 TABLET ORAL
Qty: 90 TABLET | Refills: 3 | Status: SHIPPED | OUTPATIENT
Start: 2023-05-30 | End: 2023-11-21 | Stop reason: SDUPTHER

## 2023-05-30 NOTE — ASSESSMENT & PLAN NOTE
DXA 4/2021 -2.4 tscore in spine. FRAX not elevated.   continue vitamin D intake, calcium   avoid falls   repeat DXA later this year

## 2023-05-30 NOTE — PROGRESS NOTES
Of note, pt prefers phone call with results. Doesn't check portal all the time.    Subjective:      Chief Complaint: Thyroid Cancer, Hypothyroidism, and Osteopenia    HPI: Erica Masterson is a 77 y.o. female who is here for a follow-up evaluation for thyroid. Last seen 10/20/2022    For her thyroid cancer:   Nodules were found several years ago, since at least 2019. Established care in endocrine here 3/2021 after repeat US still showed MNG. FNA recommended, completed 4/29/2021 and showed PTC.    Treated with total thyroidectomy on 5/2021.   Path: 2 cm, positive margin on the left. Also multifocal (4 foci, 2-5 mm)   right lobe: benign   no lymph nodes assessed   No angioinvasion, no no lymphatic invasion  Some tumor remained in situ, densely adherent/invading the trachea and RLN.    Post-op thyroglobulin: high, 91.   Presented at tumor board.  consensus was try for repeat surgery then radioactive iodine. Surgery team recommended CT to evaluate for LN.  CT done, found several abnormal lymph nodes, concerning for malignancy.  Saw ENT, had repeat surgery 9/13/2021 5/30 LN + for malignancy.    She was then treated with radioactive iodine 168.7 mCi on 12/15/2021.  Thyrogen-stimulated thyroglobulin level was 12 (TSH 92)  Post-treatment WBS 12/22/2021. Uptake in the neck, no distant disease noted.    Post-operative course has been complicated by neck tightness. Followed with ENT, s/p PT, seeing ortho as well with concerns for spinal accessory nerve injury.    Surveillance:  Last thyroglobulin:  Lab Results   Component Value Date    THYGLBTUM 16 (H) 05/22/2023    THGABSCRN <1.8 05/22/2023     Last neck US: 9/2022.   Reactive lymph nodes and scar tissue seen    With regards to her post-surgical hypothyroidism:  Current medication:  generic levothyroxine  Current dose: 75 mcg daily. EXCEPT skips Sundays.    Lab Results   Component Value Date    TSH 3.554 05/22/2023    FREET4 1.09 05/22/2023     Had low vitamin D:   Vitamin  D: was 11 (4/2021)    Last labs:  Lab Results   Component Value Date    CALCIUM 8.6 (L) 05/22/2023    ALBUMIN 4.1 05/22/2023    CREATININE 1.1 05/22/2023    JUCLCNHR47XZ 29 (L) 05/22/2023    PTH 41.0 05/22/2023      Now on 50,000 units/month    Bones: DXA 4/2021. Osteopenia. -2.4 spine    Current symptoms:  No   Yes  [x]    []  Trouble swallowing  [x]    []  Trouble breathing  []    [x]  Voice changes. After surgery. Improving.  [x]    []  Neck swelling    [x]    []  Fatigue. Energy okay, exercise a few days per week.  [x]    []  Constipation/diarrhea.  [x]    []  Heat/Cold intolerance  []    [x]  Weight gain or weight loss. Few lbs. Working on diet/exercise lately  [x]    []  Palpitations or tremor    Physical therapy and other evaluation/treatment for the neck tightness with some improvement.    Some back pains. S/p steroid shot    Reviewed past medical, family, social history and updated as appropriate.    Review of Systems  As above    Objective:     Vitals:    05/30/23 1357   BP: 120/70   Pulse: 80   Temp: 98.4 °F (36.9 °C)     BP Readings from Last 5 Encounters:   05/30/23 120/70   05/09/23 122/70   03/02/23 (!) 142/75   01/25/23 131/76   01/19/23 128/78     Physical Exam  Vitals reviewed.   Constitutional:       General: She is not in acute distress.  Neck:      Thyroid: No thyroid mass, thyromegaly or thyroid tenderness.      Comments: Absent thyroid, +scar  Cardiovascular:      Heart sounds: Normal heart sounds.   Pulmonary:      Effort: Pulmonary effort is normal.     Wt Readings from Last 10 Encounters:   05/30/23 1357 72.6 kg (160 lb)   05/09/23 1433 74.4 kg (164 lb)   05/09/23 0953 73.5 kg (162 lb 0.6 oz)   03/22/23 0908 73.5 kg (162 lb 0.6 oz)   03/02/23 0927 73.5 kg (162 lb 0.6 oz)   01/25/23 0858 71.6 kg (157 lb 15.4 oz)   01/19/23 1350 73 kg (160 lb 15 oz)   12/08/22 0916 70.3 kg (155 lb)   12/07/22 0756 70.3 kg (155 lb)   11/09/22 1144 69.1 kg (152 lb 5.4 oz)     Lab Results   Component Value Date     HGBA1C 6.1 (H) 01/25/2023     Lab Results   Component Value Date    CHOL 154 08/08/2022    HDL 59 08/08/2022    LDLCALC 86.4 08/08/2022    TRIG 43 08/08/2022    CHOLHDL 38.3 08/08/2022     Lab Results   Component Value Date     05/22/2023    K 3.9 05/22/2023     05/22/2023    CO2 27 05/22/2023     (H) 05/22/2023    BUN 16 05/22/2023    CREATININE 1.1 05/22/2023    CALCIUM 8.6 (L) 05/22/2023    PROT 7.8 05/22/2023    ALBUMIN 4.1 05/22/2023    BILITOT 0.5 05/22/2023    ALKPHOS 45 (L) 05/22/2023    AST 22 05/22/2023    ALT 18 05/22/2023    ANIONGAP 10 05/22/2023    ESTGFRAFRICA 56 (A) 07/19/2022    EGFRNONAA 48 (A) 07/19/2022    TSH 3.554 05/22/2023        Assessment/Plan:     Thyroid cancer  s/p surgery 5/2021. Multifocal PTC. 2 cm, +margin, +residual tissue.   Then s/p completion thyroidectomy 9/2021. Pathology with multiple LN + for cancer (5/30)  Post-operative thyroglobulin as high as 91 initially.  S/p radioactive iodine ablation with 168.7 mCi 12/15/2021.     - stimulated TG was 12     - post-treatment WBS negative for distant disease    For now, goal TSH on the low side.    Last thyroglobulin remains elevated, increasing from prior, up to 16.   higher than expected given surgery and the amount of iodine given.   with negative WBS on post-treatment study, not really finding many abnormalities on neck US, try for PET scan to evaluate for residual disease.    Otherwise if unable to localize anything, continue to monitor for now with labs and US in 6 months      Postoperative hypothyroidism  Goal TSH low side of normal   last TSH normal, but on the higher side   increase dose to 75 mcg/day EXCEPT Half a tablet on Sundays   recheck level in a few months      Osteopenia of lumbar spine   DXA 4/2021 -2.4 tscore in spine. FRAX not elevated.   continue vitamin D intake, calcium   avoid falls   repeat DXA later this year          Follow-up 6 months with repeat labs, neck ultrasound, as well as DXA  before        Juan M Landrum MD  Endocrinology

## 2023-05-30 NOTE — Clinical Note
Hey, this is a patient with thyroid cancer, surgery a few years ago, still has thyroglobulin elevation. Due for follow-up around November/December with labs/US/DXA before. Wondering if y'all have room on recall list. Thanks,  - Juan M

## 2023-05-30 NOTE — ASSESSMENT & PLAN NOTE
Goal TSH low side of normal   last TSH normal, but on the higher side   increase dose to 75 mcg/day EXCEPT Half a tablet on Sundays   recheck level in a few months

## 2023-05-30 NOTE — ASSESSMENT & PLAN NOTE
s/p surgery 5/2021. Multifocal PTC. 2 cm, +margin, +residual tissue.   Then s/p completion thyroidectomy 9/2021. Pathology with multiple LN + for cancer (5/30)  Post-operative thyroglobulin as high as 91 initially.  S/p radioactive iodine ablation with 168.7 mCi 12/15/2021.     - stimulated TG was 12     - post-treatment WBS negative for distant disease    For now, goal TSH on the low side.    Last thyroglobulin remains elevated, increasing from prior, up to 16.   higher than expected given surgery and the amount of iodine given.   with negative WBS on post-treatment study, not really finding many abnormalities on neck US, try for PET scan to evaluate for residual disease.    Otherwise if unable to localize anything, continue to monitor for now with labs and US in 6 months

## 2023-06-02 ENCOUNTER — TELEPHONE (OUTPATIENT)
Dept: HEMATOLOGY/ONCOLOGY | Facility: CLINIC | Age: 78
End: 2023-06-02
Payer: MEDICARE

## 2023-06-02 NOTE — TELEPHONE ENCOUNTER
CHANDRAKANTM for pt to call the office.  Will need to schedule with Dr. Pina in [place].  Dr. Daugherty is booked out through Dec 2023.

## 2023-06-02 NOTE — TELEPHONE ENCOUNTER
Returned call to pt and she stated she is seeing Perri English for thyroid cancer.  She said Dr Landrum is leaving and she will need to re-established with a new endocrinologist to cont her care.   She will be having her CTPET on Tuesday and said she just had labs done this week.   Please call pt to scheduled.

## 2023-06-02 NOTE — TELEPHONE ENCOUNTER
----- Message from Pinky Pickering sent at 6/2/2023  2:01 PM CDT -----  Type: Need Medical Advice   Who Called: Patient  Best callback number: [phone]  Additional Information: Patient called to schedule an appointment to see a Dr here due to her Dr leaving from [place]  Please call to further assist, Thanks

## 2023-06-05 ENCOUNTER — TELEPHONE (OUTPATIENT)
Dept: FAMILY MEDICINE | Facility: CLINIC | Age: 78
End: 2023-06-05
Payer: MEDICARE

## 2023-06-05 ENCOUNTER — TELEPHONE (OUTPATIENT)
Dept: ENDOCRINOLOGY | Facility: CLINIC | Age: 78
End: 2023-06-05
Payer: MEDICARE

## 2023-06-05 NOTE — TELEPHONE ENCOUNTER
Patient states she was advised Dr. Landrum is no longer going to be available. Patient would like to continue seeing endocrinology provider in [place] due to transportation concerns. Please advise if patient can continue seeing a provider in the [place] clinic. Thank you.

## 2023-06-05 NOTE — TELEPHONE ENCOUNTER
Frances LOU Staff    ----- Message from Frances Pantoja sent at 6/5/2023  4:09 PM CDT -----  Contact: self  Type:  Needs Medical Advice    Who Called: self  Would the patient rather a call back or a response via [facility]? call  Best Call Back Number: [phone] (home)     Additional Information: pt needs to know if dr can refer any other endocrinologists since dr pond will no longer be with [facility]. Please advise and thank you.

## 2023-06-05 NOTE — TELEPHONE ENCOUNTER
----- Message from Diane Naidu sent at 6/5/2023  2:33 PM CDT -----  Type: Needs Medical Advice  Who Called:  Patient   Symptoms (please be specific):    How long has patient had these symptoms:    Pharmacy name and phone #:    Best Call Back Number: [phone]  Additional Information: Patient is requesting a call back to schedule an appt..

## 2023-06-05 NOTE — TELEPHONE ENCOUNTER
S/w pt.former pt of Lucita.Notified her to call [facility] or look outside [facility].Pt verb understanding

## 2023-06-06 ENCOUNTER — HOSPITAL ENCOUNTER (OUTPATIENT)
Dept: RADIOLOGY | Facility: HOSPITAL | Age: 78
Discharge: HOME | End: 2023-06-06
Attending: INTERNAL MEDICINE
Payer: MEDICARE

## 2023-06-06 VITALS — BODY MASS INDEX: 25.71 KG/M2 | WEIGHT: 160 LBS | HEIGHT: 66 IN

## 2023-06-06 DIAGNOSIS — C73 MALIGNANT NEOPLASM OF THYROID GLAND: ICD-10-CM

## 2023-06-06 LAB — GLUCOSE SERPL-MCNC: 73 MG/DL (ref 70–110)

## 2023-06-06 PROCEDURE — A9552 F18 FDG: HCPCS | Mod: PO

## 2023-06-07 ENCOUNTER — TELEPHONE (OUTPATIENT)
Dept: PAIN MEDICINE | Facility: CLINIC | Age: 78
End: 2023-06-07
Payer: MEDICARE

## 2023-06-07 NOTE — TELEPHONE ENCOUNTER
"----- Message from Peg Zamora LPN sent at 6/7/2023  7:58 AM CDT -----  She was unsure. We offered to cancel her F/U and she stated she would "wait to talk to someone tomorrow"  ----- Message -----  From: Faith Ghotra LPN  Sent: 6/7/2023   7:23 AM CDT  To: Peg Zamora LPN    So she wants to cancel 06/09 procedure?  ----- Message -----  From: Peg Zamora LPN  Sent: 6/6/2023   4:36 PM CDT  To: Faith Ghotra LPN    She came into the clinic today as I was leaving. She stated she is feeling better and wanted to be R/S for 1-2 weeks later. I explained that Dr. Chiang was booked out until later in July. She stated she would wait and hear from someone tomorrow. I explained that we could not promise if the pain returned that she would be scheduled right away due to schedule being full.  ----- Message -----  From: Stephani Edmonds  Sent: 6/6/2023   4:09 PM CDT  To: Андрей Gallego Staff    Type: Needs Medical Advice  Who Called:  pt   Symptoms (please be specific):  procedure appt  Best Call Back Number: [phone] or [phone]   Additional Information: pt is needing to juanita appt wants to juanita within a week or two please call back to advise asap, thanks!              "

## 2023-06-07 NOTE — TELEPHONE ENCOUNTER
Pt wishes to cancel 06/09 and reschedule called pt to reschedule.  Rescheduled to 07/18 she verbalizes understanding, Informed surgery suite to move procedure.

## 2023-06-08 ENCOUNTER — TELEPHONE (OUTPATIENT)
Dept: PAIN MEDICINE | Facility: CLINIC | Age: 78
End: 2023-06-08
Payer: MEDICARE

## 2023-06-08 NOTE — TELEPHONE ENCOUNTER
----- Message from Tim Mckeon sent at 6/8/2023  7:42 AM CDT -----  Type: Needs Medical Advice  Who Called:  Patient    Best Call Back Number:[phone]  Additional Information: Patient states that she would like a callback regarding rescheduling her procedure.

## 2023-06-08 NOTE — TELEPHONE ENCOUNTER
I spoke with pt she did not realize she had another appt on 07/18 wants to move to 07/20 I informed surgery scheduling.

## 2023-06-13 ENCOUNTER — TELEPHONE (OUTPATIENT)
Dept: ENDOCRINOLOGY | Facility: CLINIC | Age: 78
End: 2023-06-13
Payer: MEDICARE

## 2023-06-13 NOTE — TELEPHONE ENCOUNTER
Advised patient that CT Scan was mailed out to her on 6-8-23. Advised that it goes to [facility] and is mailed out from there.

## 2023-06-13 NOTE — TELEPHONE ENCOUNTER
----- Message from Angelia Jeffries sent at 6/13/2023  3:33 PM CDT -----  Contact: Patient  Type:  Test Results    Who Called:   Patient    Name of Test (Lab/Mammo/Etc):   CT Scan  Date of Test:   Last week  Ordering Provider:   Dr Landrum  Where the test was performed:   [facility]    Would the patient rather a call back or a response via [facility]?   Call back  Best Call Back Number:   [phone]    Additional Information:  States she requested lab results in the mail to her, but she has not received them yet - please call to advise/discuss - thank you

## 2023-06-28 ENCOUNTER — TELEPHONE (OUTPATIENT)
Dept: FAMILY MEDICINE | Facility: CLINIC | Age: 78
End: 2023-06-28
Payer: MEDICARE

## 2023-06-28 ENCOUNTER — TELEPHONE (OUTPATIENT)
Dept: PAIN MEDICINE | Facility: CLINIC | Age: 78
End: 2023-06-28
Payer: MEDICARE

## 2023-06-28 NOTE — TELEPHONE ENCOUNTER
----- Message from Stacey M Lefort sent at 6/28/2023  8:30 AM CDT -----  Pt is returning your call at [phone]. She is requesting a callback after 2:30. Thank you.

## 2023-06-28 NOTE — TELEPHONE ENCOUNTER
Elizabeth LOU Staff    ----- Message from Elizabeth Lara sent at 6/28/2023  3:16 PM CDT -----  Contact: Patient  Type:  Sooner Appointment Request    Caller is requesting a sooner appointment.  Caller declined first available appointment listed below.  Caller will not accept being placed on the waitlist and is requesting a message be sent to doctor.    Name of Caller:  Patient  When is the first available appointment?  na  Symptoms:  boil on lip of vagina, wants it lanced  Best Call Back Number:  [phone]  Additional Information:  Please call the patient back at the phone number listed above to advise. Thank you!

## 2023-06-28 NOTE — TELEPHONE ENCOUNTER
"Call placed to patient who states she has something like a "risen" on lip of vagina that is painful. Patient states "I really don't know what it is. It's on the lip of my vagina and it really hurts. I want someone to take a look at it and tell me what it is exactly."  Writer advised patient the original message from patient access states she reported having a boil and requested to have boil lanced. Writer advised patient if it is a boil, there is no provider in family medicine clinic that will/can amadeo it. Advised she would need to see GYN. Patient verbalized understanding, and states she did not tell patient access she wanted the area lanced. Patient states "I don't know where she got that information from. I did not say I wanted it lanced, I don't even know what it is."  Appointment scheduled for tomorrow's date 6-29-23. Patient agreed to appointment date, time, and location. Location confirmed at the time of call (including address and which specific side of building to enter for check in).    "

## 2023-06-28 NOTE — TELEPHONE ENCOUNTER
----- Message from Nancy Sands sent at 6/28/2023  4:12 PM CDT -----  Regarding: Same Day Appointment Request  Contact: patient at [phone]  Type:  Same Day Appointment Request    Caller is requesting a same day appointment.  Caller declined first available appointment listed below.      Name of Caller:  patient at [phone]    When is the first available appointment?  none  Symptoms:  boil on vagina, burning    Additional Information:   Please call and advise. Thank you

## 2023-06-29 ENCOUNTER — OFFICE VISIT (OUTPATIENT)
Dept: FAMILY MEDICINE | Facility: CLINIC | Age: 78
End: 2023-06-29
Payer: MEDICARE

## 2023-06-29 VITALS
HEART RATE: 86 BPM | BODY MASS INDEX: 26.12 KG/M2 | DIASTOLIC BLOOD PRESSURE: 80 MMHG | HEIGHT: 66 IN | WEIGHT: 162.5 LBS | SYSTOLIC BLOOD PRESSURE: 130 MMHG | OXYGEN SATURATION: 97 % | TEMPERATURE: 99 F

## 2023-06-29 DIAGNOSIS — N90.7 LABIAL CYST: Primary | ICD-10-CM

## 2023-06-29 PROCEDURE — 1159F MED LIST DOCD IN RCRD: CPT | Mod: CPTII,S$GLB,, | Performed by: STUDENT IN AN ORGANIZED HEALTH CARE EDUCATION/TRAINING PROGRAM

## 2023-06-29 PROCEDURE — 3072F PR LOW RISK FOR RETINOPATHY: ICD-10-PCS | Mod: CPTII,S$GLB,, | Performed by: STUDENT IN AN ORGANIZED HEALTH CARE EDUCATION/TRAINING PROGRAM

## 2023-06-29 PROCEDURE — 99999 PR PBB SHADOW E&M-EST. PATIENT-LVL IV: ICD-10-PCS | Mod: PBBFAC,,, | Performed by: STUDENT IN AN ORGANIZED HEALTH CARE EDUCATION/TRAINING PROGRAM

## 2023-06-29 PROCEDURE — 1160F RVW MEDS BY RX/DR IN RCRD: CPT | Mod: CPTII,S$GLB,, | Performed by: STUDENT IN AN ORGANIZED HEALTH CARE EDUCATION/TRAINING PROGRAM

## 2023-06-29 PROCEDURE — 1160F PR REVIEW ALL MEDS BY PRESCRIBER/CLIN PHARMACIST DOCUMENTED: ICD-10-PCS | Mod: CPTII,S$GLB,, | Performed by: STUDENT IN AN ORGANIZED HEALTH CARE EDUCATION/TRAINING PROGRAM

## 2023-06-29 PROCEDURE — 99212 OFFICE O/P EST SF 10 MIN: CPT | Mod: S$GLB,,, | Performed by: STUDENT IN AN ORGANIZED HEALTH CARE EDUCATION/TRAINING PROGRAM

## 2023-06-29 PROCEDURE — 1159F PR MEDICATION LIST DOCUMENTED IN MEDICAL RECORD: ICD-10-PCS | Mod: CPTII,S$GLB,, | Performed by: STUDENT IN AN ORGANIZED HEALTH CARE EDUCATION/TRAINING PROGRAM

## 2023-06-29 PROCEDURE — 3075F SYST BP GE 130 - 139MM HG: CPT | Mod: CPTII,S$GLB,, | Performed by: STUDENT IN AN ORGANIZED HEALTH CARE EDUCATION/TRAINING PROGRAM

## 2023-06-29 PROCEDURE — 3079F PR MOST RECENT DIASTOLIC BLOOD PRESSURE 80-89 MM HG: ICD-10-PCS | Mod: CPTII,S$GLB,, | Performed by: STUDENT IN AN ORGANIZED HEALTH CARE EDUCATION/TRAINING PROGRAM

## 2023-06-29 PROCEDURE — 99212 PR OFFICE/OUTPT VISIT, EST, LEVL II, 10-19 MIN: ICD-10-PCS | Mod: S$GLB,,, | Performed by: STUDENT IN AN ORGANIZED HEALTH CARE EDUCATION/TRAINING PROGRAM

## 2023-06-29 PROCEDURE — 1125F PR PAIN SEVERITY QUANTIFIED, PAIN PRESENT: ICD-10-PCS | Mod: CPTII,S$GLB,, | Performed by: STUDENT IN AN ORGANIZED HEALTH CARE EDUCATION/TRAINING PROGRAM

## 2023-06-29 PROCEDURE — 1125F AMNT PAIN NOTED PAIN PRSNT: CPT | Mod: CPTII,S$GLB,, | Performed by: STUDENT IN AN ORGANIZED HEALTH CARE EDUCATION/TRAINING PROGRAM

## 2023-06-29 PROCEDURE — 3079F DIAST BP 80-89 MM HG: CPT | Mod: CPTII,S$GLB,, | Performed by: STUDENT IN AN ORGANIZED HEALTH CARE EDUCATION/TRAINING PROGRAM

## 2023-06-29 PROCEDURE — 3075F PR MOST RECENT SYSTOLIC BLOOD PRESS GE 130-139MM HG: ICD-10-PCS | Mod: CPTII,S$GLB,, | Performed by: STUDENT IN AN ORGANIZED HEALTH CARE EDUCATION/TRAINING PROGRAM

## 2023-06-29 PROCEDURE — 3072F LOW RISK FOR RETINOPATHY: CPT | Mod: CPTII,S$GLB,, | Performed by: STUDENT IN AN ORGANIZED HEALTH CARE EDUCATION/TRAINING PROGRAM

## 2023-06-29 PROCEDURE — 99999 PR PBB SHADOW E&M-EST. PATIENT-LVL IV: CPT | Mod: PBBFAC,,, | Performed by: STUDENT IN AN ORGANIZED HEALTH CARE EDUCATION/TRAINING PROGRAM

## 2023-06-29 RX ORDER — SULFAMETHOXAZOLE AND TRIMETHOPRIM 800; 160 MG/1; MG/1
1 TABLET ORAL 2 TIMES DAILY
Qty: 14 TABLET | Refills: 0 | Status: SHIPPED | OUTPATIENT
Start: 2023-06-29 | End: 2023-07-06

## 2023-06-29 NOTE — PATIENT INSTRUCTIONS
King Maldonado,     If you are due for any health screening(s) below please notify me so we can arrange them to be ordered and scheduled to maintain your health. Most healthy patients complete it. Don't lose out on improving your health.     All of your core healthy metrics are met.

## 2023-06-29 NOTE — PROGRESS NOTES
SUBJECTIVE:    CHIEF COMPLAINT: No chief complaint on file.          274}    HISTORY OF PRESENT ILLNESS:  Erica Masterson is a 77 y.o. female who presents to the clinic today for evaluation labial cyst.  She reports that labial cyst has been ongoing for the past week and has turned red.  She has observe minor skin irritation with urination.  She denies any fevers, chills, nausea or vomiting.      PAST MEDICAL HISTORY:     274}  Past Medical History:   Diagnosis Date    Adenomatous polyp of colon 3/26/2012    Allergy     Anxiety     Cancer     Cataract     Colon polyp     Degenerative arthritis of knee 04/03/2012    Diverticulosis     Encounter for blood transfusion     GERD (gastroesophageal reflux disease)     History of thyroid cancer 2021    Hyperlipidemia     Hypertension     Lateral meniscal tear 5/20/2013    Multinodular goiter 03/26/2012    Myopathy, unspecified 01/18/2010    Tuberculosis        PAST SURGICAL HISTORY:  Past Surgical History:   Procedure Laterality Date    BREAST BIOPSY Left 2015    benign    CHOLECYSTECTOMY      COLONOSCOPY  12/2/15    Dr. Britton, multiple polyps, recheck five years-three years if more than 2 polyps are adenomatous    COLONOSCOPY N/A 12/2/2015    COLONOSCOPY N/A 3/20/2019    Procedure: COLONOSCOPY;  Surgeon: Jose Britton MD;  Location: [facility];  Service: Endoscopy;  Laterality: N/A;    COLONOSCOPY N/A 5/20/2020    Dr. Britton; internal hemorrhoids; diverticulosis; polyps removed; repeat in 3 years    CYSTOSCOPY N/A 8/26/2020    Procedure: CYSTOSCOPY;  Surgeon: Charlotte Walters Jr., MD;  Location: [facility];  Service: Urology;  Laterality: N/A;    DISSECTION OF NECK Left 9/13/2021    Procedure: DISSECTION, NECK;  Surgeon: Ryan Torres MD;  Location: [address];  Service: ENT;  Laterality: Left;    ESOPHAGEAL MANOMETRY WITH MEASUREMENT OF IMPEDANCE N/A 8/22/2022    Procedure: MANOMETRY, ESOPHAGUS, WITH IMPEDANCE MEASUREMENT;  Surgeon: Pantera Mcguire MD;  Location: [facility]  ENDO (4TH FLR);  Service: Endoscopy;  Laterality: N/A;  8/4 fully vaccinated; instructions mailed-st    ESOPHAGOGASTRODUODENOSCOPY N/A 5/20/2020    Dr. Britton; small hiatal hernia; gastritis; 8 gastric polyps removed    ESOPHAGOGASTRODUODENOSCOPY N/A 4/1/2022    Procedure: EGD (ESOPHAGOGASTRODUODENOSCOPY);  Surgeon: Jose Britton MD;  Location: [facility] ENDO;  Service: Endoscopy;  Laterality: N/A;    FOOT SURGERY      HYSTERECTOMY      TVH/BSO > 20 years    INTRALUMINAL GASTROINTESTINAL TRACT IMAGING VIA CAPSULE N/A 6/4/2020    Procedure: IMAGING PROCEDURE, GI TRACT, INTRALUMINAL, VIA CAPSULE;  Surgeon: Jose Britton MD;  Location: [facility];  Service: Endoscopy;  Laterality: N/A;    KNEE SURGERY  06/06/2013    right knee tear Dr Iverson     LAPAROSCOPIC CHOLECYSTECTOMY N/A 9/17/2020    Procedure: CHOLECYSTECTOMY, LAPAROSCOPIC;  Surgeon: Forrest Veronica MD;  Location: [facility];  Service: General;  Laterality: N/A;    LUNG LOBECTOMY  1966    right middle lobectomy, due to Tb    OOPHORECTOMY      SHOULDER SURGERY      francis     THYROIDECTOMY Bilateral 5/19/2021    Procedure: THYROIDECTOMY;  Surgeon: Jamia Amezquita MD;  Location: [facility] OR 2ND FLR;  Service: General;  Laterality: Bilateral;    THYROIDECTOMY Bilateral 9/13/2021    Procedure: THYROIDECTOMY WITH INTRA-OP PTH;  Surgeon: Ryan Torres MD;  Location: [facility] OR 2ND FLR;  Service: ENT;  Laterality: Bilateral;  NIM tube  Intraop PTH       SOCIAL HISTORY:  Social History     Socioeconomic History    Marital status:    Tobacco Use    Smoking status: Never    Smokeless tobacco: Never   Substance and Sexual Activity    Alcohol use: Not Currently     Comment: stopped 2019    Drug use: No    Sexual activity: Not Currently     Social Determinants of Health     Financial Resource Strain: Low Risk     Difficulty of Paying Living Expenses: Not hard at all   Food Insecurity: No Food Insecurity    Worried About Running Out of Food in the Last Year: Never  true    Ran Out of Food in the Last Year: Never true   Transportation Needs: No Transportation Needs    Lack of Transportation (Medical): No    Lack of Transportation (Non-Medical): No   Physical Activity: Sufficiently Active    Days of Exercise per Week: 3 days    Minutes of Exercise per Session: 60 min   Stress: No Stress Concern Present    Feeling of Stress : Not at all   Social Connections: Moderately Integrated    Frequency of Communication with Friends and Family: Three times a week    Frequency of Social Gatherings with Friends and Family: Three times a week    Attends Mandae[employer] Services: More than 4 times per year    Active Member of Clubs or Organizations: No    Attends Club or Organization Meetings: Never    Marital Status:    Housing Stability: Low Risk     Unable to Pay for Housing in the Last Year: No    Number of Places Lived in the Last Year: 1    Unstable Housing in the Last Year: No       FAMILY HISTORY:       Family History   Problem Relation Age of Onset    Colon cancer Other     Cancer Mother     Hypertension Mother     Hyperlipidemia Mother     Cancer Brother     Hypertension Father     Emphysema Father     Diabetes Son     Hypertension Son     Alcohol abuse Son     Diabetes Maternal Aunt     Alzheimer's disease Maternal Uncle     Cancer Maternal Grandmother     No Known Problems Daughter     Dementia Sister     Alzheimer's disease Sister     Breast cancer Sister 60    Obesity Paternal Uncle     Prostate cancer Other     Cancer Brother     Melanoma Neg Hx     Psoriasis Neg Hx     Lupus Neg Hx     Eczema Neg Hx     Amblyopia Neg Hx     Blindness Neg Hx     Cataracts Neg Hx     Glaucoma Neg Hx     Macular degeneration Neg Hx     Retinal detachment Neg Hx     Strabismus Neg Hx     Stroke Neg Hx     Thyroid cancer Neg Hx     Colon polyps Neg Hx     Ulcerative colitis Neg Hx     Stomach cancer Neg Hx     Rectal cancer Neg Hx        ALLERGIES AND MEDICATIONS: updated and reviewed.       274}  Review of patient's allergies indicates:  No Known Allergies  Medication List with Changes/Refills   New Medications    SULFAMETHOXAZOLE-TRIMETHOPRIM 800-160MG (BACTRIM DS) 800-160 MG TAB    Take 1 tablet by mouth 2 (two) times daily. For abscess for 7 days   Current Medications    AMLODIPINE (NORVASC) 2.5 MG TABLET    Take 1 tablet (2.5 mg total) by mouth once daily.    ARTIFICIAL TEARS (ISOPTO TEARS) 0.5 % OPHTHALMIC SOLUTION    1 drop as needed.    BLOOD SUGAR DIAGNOSTIC (BLOOD GLUCOSE TEST) STRP    True Metrix glucose strips check once daily    BLOOD-GLUCOSE METER KIT    True Metrix glucometer check glucose once daily    CALCIUM CARBONATE (TUMS) 200 MG CALCIUM (500 MG) CHEWABLE TABLET    Chew and swallow 2 tablets (1,000 mg total) by mouth 3 (three) times daily. for 14 days    CONJUGATED ESTROGENS (PREMARIN) VAGINAL CREAM    Place 0.5 g vaginally once daily AND 0.5 g twice a week.    COQ10, UBIQUINOL, 100 MG CAP    Take 100 mg by mouth once daily.    DOXEPIN (SINEQUAN) 25 MG CAPSULE    Take 25 mg by mouth every evening.    ERGOCALCIFEROL (ERGOCALCIFEROL) 50,000 UNIT CAP    Take 1 capsule (50,000 Units total) by mouth every 30 days.    GABAPENTIN (NEURONTIN) 300 MG CAPSULE    Take 300 mg by mouth 3 (three) times daily.    LEVOTHYROXINE (SYNTHROID) 75 MCG TABLET    Take 1 tablet (75 mcg total) by mouth before breakfast. Except half a tablet on Sundays    LOSARTAN (COZAAR) 100 MG TABLET    Take 1 tablet (100 mg total) by mouth once daily.    ROSUVASTATIN (CRESTOR) 10 MG TABLET    Take 1 tablet (10 mg total) by mouth once daily.       SCREENING HISTORY:    274}  Health Maintenance         Date Due Completion Date    TETANUS VACCINE 11/02/2021 11/2/2011    COVID-19 Vaccine (5 - Booster for Pfizer series) 06/14/2022 4/19/2022    Hemoglobin A1c 07/25/2023 1/25/2023    Lipid Panel 08/08/2023 8/8/2022    Override on 9/26/2016: Done ([facility] record sent to scanning)    Colonoscopy 11/16/2023 11/16/2020    Override  "on 10/12/2010: Done (Dr. Solares, 5 year recheck)    Diabetes Urine Screening 01/25/2024 1/25/2023    DEXA Scan 04/27/2024 4/27/2021    Eye Exam 05/01/2024 5/1/2023    Override on 6/15/2020: Done            REVIEW OF SYSTEMS:   Review of Systems   Constitutional:  Negative for activity change, diaphoresis, fatigue, fever and unexpected weight change.   HENT:  Negative for postnasal drip and rhinorrhea.    Eyes:  Negative for photophobia and visual disturbance.   Respiratory:  Negative for cough, shortness of breath and wheezing.    Cardiovascular:  Negative for chest pain, palpitations and leg swelling.   Gastrointestinal:  Negative for abdominal distention, abdominal pain, constipation, diarrhea, nausea and vomiting.   Genitourinary:  Positive for genital sores. Negative for bladder incontinence, difficulty urinating and frequency.   Musculoskeletal:  Negative for joint swelling and leg pain.   Integumentary:  Negative for pallor and rash.   Neurological:  Negative for dizziness, weakness and numbness.     PHYSICAL EXAM:      274}  /80   Pulse 86   Temp 98.6 °F (37 °C)   Ht 5' 6" (1.676 m)   Wt 73.7 kg (162 lb 7.7 oz)   SpO2 97%   BMI 26.22 kg/m²   Wt Readings from Last 3 Encounters:   06/29/23 73.7 kg (162 lb 7.7 oz)   06/06/23 72.6 kg (160 lb)   05/30/23 72.6 kg (160 lb)     BP Readings from Last 3 Encounters:   06/29/23 130/80   05/30/23 120/70   05/09/23 122/70     Estimated body mass index is 26.22 kg/m² as calculated from the following:    Height as of this encounter: 5' 6" (1.676 m).    Weight as of this encounter: 73.7 kg (162 lb 7.7 oz).     Physical Exam  Constitutional:       Appearance: Normal appearance. She is normal weight.   HENT:      Head: Normocephalic and atraumatic.      Mouth/Throat:      Mouth: Mucous membranes are moist.   Eyes:      Extraocular Movements: Extraocular movements intact.      Pupils: Pupils are equal, round, and reactive to light.   Cardiovascular:      Rate and " Rhythm: Normal rate and regular rhythm.   Genitourinary:     Labia:         Right: Lesion present.           Comments: Two small labial cysts noted 1 tender to palpation with minor redness.  Neurological:      Mental Status: She is alert.       LABS:   274}  I have reviewed old labs below:  Lab Results   Component Value Date    WBC 4.50 11/09/2022    HGB 12.2 11/09/2022    HCT 39.0 11/09/2022    MCV 92 11/09/2022     11/09/2022     05/22/2023    K 3.9 05/22/2023     05/22/2023    CALCIUM 8.6 (L) 05/22/2023    PHOS 4.6 (H) 08/09/2022    CO2 27 05/22/2023     (H) 05/22/2023    BUN 16 05/22/2023    CREATININE 1.1 05/22/2023    ANIONGAP 10 05/22/2023    ESTGFRAFRICA 56 (A) 07/19/2022    EGFRNONAA 48 (A) 07/19/2022    PROT 7.8 05/22/2023    ALBUMIN 4.1 05/22/2023    BILITOT 0.5 05/22/2023    ALKPHOS 45 (L) 05/22/2023    ALT 18 05/22/2023    AST 22 05/22/2023    INR 1.0 08/16/2022    CHOL 154 08/08/2022    TRIG 43 08/08/2022    HDL 59 08/08/2022    LDLCALC 86.4 08/08/2022    TSH 3.554 05/22/2023    GLUF 104 05/22/2004    HGBA1C 6.1 (H) 01/25/2023       ASSESSMENT AND PLAN:  274}  1. Labial cyst  Assessment & Plan:  2 small cysts noted to right labia.  Once this with erythema and redness.    Started Bactrim therapy.    Recommended daily cleansing and warm compresses to the area until resolved.    Orders:  -     sulfamethoxazole-trimethoprim 800-160mg (BACTRIM DS) 800-160 mg Tab; Take 1 tablet by mouth 2 (two) times daily. For abscess for 7 days  Dispense: 14 tablet; Refill: 0           No orders of the defined types were placed in this encounter.      No follow-ups on file. or sooner as needed.    I spent a total of 12 minutes on the day of the visit.This includes face to face time and non-face to face time preparing to see the patient (eg, review of tests), obtaining and/or reviewing separately obtained history, documenting clinical information in the electronic or other health record,  independently interpreting results and communicating results to the patient/family/caregiver, or care coordinator.

## 2023-06-29 NOTE — ASSESSMENT & PLAN NOTE
2 small cysts noted to right labia.  Once this with erythema and redness.    Started Bactrim therapy.    Recommended daily cleansing and warm compresses to the area until resolved.

## 2023-07-06 ENCOUNTER — OFFICE VISIT (OUTPATIENT)
Dept: OBSTETRICS AND GYNECOLOGY | Facility: CLINIC | Age: 78
End: 2023-07-06
Payer: MEDICARE

## 2023-07-06 VITALS
BODY MASS INDEX: 25.61 KG/M2 | WEIGHT: 159.38 LBS | HEIGHT: 66 IN | DIASTOLIC BLOOD PRESSURE: 58 MMHG | HEART RATE: 70 BPM | SYSTOLIC BLOOD PRESSURE: 123 MMHG

## 2023-07-06 DIAGNOSIS — L72.3 SEBACEOUS CYST: Primary | ICD-10-CM

## 2023-07-06 PROCEDURE — 1126F AMNT PAIN NOTED NONE PRSNT: CPT | Mod: CPTII,S$GLB,, | Performed by: STUDENT IN AN ORGANIZED HEALTH CARE EDUCATION/TRAINING PROGRAM

## 2023-07-06 PROCEDURE — 3288F PR FALLS RISK ASSESSMENT DOCUMENTED: ICD-10-PCS | Mod: CPTII,S$GLB,, | Performed by: STUDENT IN AN ORGANIZED HEALTH CARE EDUCATION/TRAINING PROGRAM

## 2023-07-06 PROCEDURE — 99213 PR OFFICE/OUTPT VISIT, EST, LEVL III, 20-29 MIN: ICD-10-PCS | Mod: S$GLB,,, | Performed by: STUDENT IN AN ORGANIZED HEALTH CARE EDUCATION/TRAINING PROGRAM

## 2023-07-06 PROCEDURE — 3074F PR MOST RECENT SYSTOLIC BLOOD PRESSURE < 130 MM HG: ICD-10-PCS | Mod: CPTII,S$GLB,, | Performed by: STUDENT IN AN ORGANIZED HEALTH CARE EDUCATION/TRAINING PROGRAM

## 2023-07-06 PROCEDURE — 1159F PR MEDICATION LIST DOCUMENTED IN MEDICAL RECORD: ICD-10-PCS | Mod: CPTII,S$GLB,, | Performed by: STUDENT IN AN ORGANIZED HEALTH CARE EDUCATION/TRAINING PROGRAM

## 2023-07-06 PROCEDURE — 1160F RVW MEDS BY RX/DR IN RCRD: CPT | Mod: CPTII,S$GLB,, | Performed by: STUDENT IN AN ORGANIZED HEALTH CARE EDUCATION/TRAINING PROGRAM

## 2023-07-06 PROCEDURE — 99213 OFFICE O/P EST LOW 20 MIN: CPT | Mod: S$GLB,,, | Performed by: STUDENT IN AN ORGANIZED HEALTH CARE EDUCATION/TRAINING PROGRAM

## 2023-07-06 PROCEDURE — 99999 PR PBB SHADOW E&M-EST. PATIENT-LVL III: CPT | Mod: PBBFAC,,, | Performed by: STUDENT IN AN ORGANIZED HEALTH CARE EDUCATION/TRAINING PROGRAM

## 2023-07-06 PROCEDURE — 1101F PT FALLS ASSESS-DOCD LE1/YR: CPT | Mod: CPTII,S$GLB,, | Performed by: STUDENT IN AN ORGANIZED HEALTH CARE EDUCATION/TRAINING PROGRAM

## 2023-07-06 PROCEDURE — 3072F LOW RISK FOR RETINOPATHY: CPT | Mod: CPTII,S$GLB,, | Performed by: STUDENT IN AN ORGANIZED HEALTH CARE EDUCATION/TRAINING PROGRAM

## 2023-07-06 PROCEDURE — 3288F FALL RISK ASSESSMENT DOCD: CPT | Mod: CPTII,S$GLB,, | Performed by: STUDENT IN AN ORGANIZED HEALTH CARE EDUCATION/TRAINING PROGRAM

## 2023-07-06 PROCEDURE — 3072F PR LOW RISK FOR RETINOPATHY: ICD-10-PCS | Mod: CPTII,S$GLB,, | Performed by: STUDENT IN AN ORGANIZED HEALTH CARE EDUCATION/TRAINING PROGRAM

## 2023-07-06 PROCEDURE — 99999 PR PBB SHADOW E&M-EST. PATIENT-LVL III: ICD-10-PCS | Mod: PBBFAC,,, | Performed by: STUDENT IN AN ORGANIZED HEALTH CARE EDUCATION/TRAINING PROGRAM

## 2023-07-06 PROCEDURE — 1101F PR PT FALLS ASSESS DOC 0-1 FALLS W/OUT INJ PAST YR: ICD-10-PCS | Mod: CPTII,S$GLB,, | Performed by: STUDENT IN AN ORGANIZED HEALTH CARE EDUCATION/TRAINING PROGRAM

## 2023-07-06 PROCEDURE — 1160F PR REVIEW ALL MEDS BY PRESCRIBER/CLIN PHARMACIST DOCUMENTED: ICD-10-PCS | Mod: CPTII,S$GLB,, | Performed by: STUDENT IN AN ORGANIZED HEALTH CARE EDUCATION/TRAINING PROGRAM

## 2023-07-06 PROCEDURE — 3078F DIAST BP <80 MM HG: CPT | Mod: CPTII,S$GLB,, | Performed by: STUDENT IN AN ORGANIZED HEALTH CARE EDUCATION/TRAINING PROGRAM

## 2023-07-06 PROCEDURE — 1159F MED LIST DOCD IN RCRD: CPT | Mod: CPTII,S$GLB,, | Performed by: STUDENT IN AN ORGANIZED HEALTH CARE EDUCATION/TRAINING PROGRAM

## 2023-07-06 PROCEDURE — 3074F SYST BP LT 130 MM HG: CPT | Mod: CPTII,S$GLB,, | Performed by: STUDENT IN AN ORGANIZED HEALTH CARE EDUCATION/TRAINING PROGRAM

## 2023-07-06 PROCEDURE — 3078F PR MOST RECENT DIASTOLIC BLOOD PRESSURE < 80 MM HG: ICD-10-PCS | Mod: CPTII,S$GLB,, | Performed by: STUDENT IN AN ORGANIZED HEALTH CARE EDUCATION/TRAINING PROGRAM

## 2023-07-06 PROCEDURE — 1126F PR PAIN SEVERITY QUANTIFIED, NO PAIN PRESENT: ICD-10-PCS | Mod: CPTII,S$GLB,, | Performed by: STUDENT IN AN ORGANIZED HEALTH CARE EDUCATION/TRAINING PROGRAM

## 2023-07-07 NOTE — PROGRESS NOTES
"[facility] Note    Subjective:     Chief Complaint: Follow-up ("Bumps"')      HPI:  2023    77 year old female presents as an established patient with complaints of a bump in the vulva. She reports it occurred about one to two weeks ago. She was placed on antibiotics and reports that it somewhat helped decrease the size of the bump. She reports that these bumps have come and go for the past few months.  It is sometimes tender. Denies any drainage or bleeding.     She has had a TVH/BSO.       GYN & OB History  No LMP recorded. Patient is postmenopausal.     Date of Last Pap: No result found    OB History    Para Term  AB Living   2 2 2 0 0 2   SAB IAB Ectopic Multiple Live Births   0 0 0 0 2      # Outcome Date GA Lbr William/2nd Weight Sex Delivery Anes PTL Lv   2 Term            1 Term               Obstetric Comments   Vag Del x 2       Past Medical History:   Diagnosis Date    Adenomatous polyp of colon 3/26/2012    Allergy     Anxiety     Cancer     Cataract     Colon polyp     Degenerative arthritis of knee 2012    Diverticulosis     Encounter for blood transfusion     GERD (gastroesophageal reflux disease)     History of thyroid cancer     Hyperlipidemia     Hypertension     Lateral meniscal tear 2013    Multinodular goiter 2012    Myopathy, unspecified 2010    Tuberculosis      Past Surgical History:   Procedure Laterality Date    BREAST BIOPSY Left     benign    CHOLECYSTECTOMY      COLONOSCOPY  2015    Dr. Britton, multiple polyps, recheck five years-three years if more than 2 polyps are adenomatous    COLONOSCOPY N/A 2015    COLONOSCOPY N/A 2019    Procedure: COLONOSCOPY;  Surgeon: Jose Britton MD;  Location: [facility];  Service: Endoscopy;  Laterality: N/A;    COLONOSCOPY N/A 2020    Dr. Britton; internal hemorrhoids; diverticulosis; polyps removed; repeat in 3 years    CYSTOSCOPY N/A 2020    " Procedure: CYSTOSCOPY;  Surgeon: Charlotte Walters Jr., MD;  Location: [facility];  Service: Urology;  Laterality: N/A;    DILATION AND CURETTAGE OF UTERUS      DISSECTION OF NECK Left 09/13/2021    Procedure: DISSECTION, NECK;  Surgeon: Ryan Torres MD;  Location: [facility] OR 2ND FLR;  Service: ENT;  Laterality: Left;    ESOPHAGEAL MANOMETRY WITH MEASUREMENT OF IMPEDANCE N/A 08/22/2022    Procedure: MANOMETRY, ESOPHAGUS, WITH IMPEDANCE MEASUREMENT;  Surgeon: Pantera Mcguire MD;  Location: [facility] (4TH FLR);  Service: Endoscopy;  Laterality: N/A;  8/4 fully vaccinated; instructions mailed-st    ESOPHAGOGASTRODUODENOSCOPY N/A 05/20/2020    Dr. Britton; small hiatal hernia; gastritis; 8 gastric polyps removed    ESOPHAGOGASTRODUODENOSCOPY N/A 04/01/2022    Procedure: EGD (ESOPHAGOGASTRODUODENOSCOPY);  Surgeon: Jose Britton MD;  Location: [facility];  Service: Endoscopy;  Laterality: N/A;    FOOT SURGERY      HYSTERECTOMY      TVH/BSO > 20 years    INTRALUMINAL GASTROINTESTINAL TRACT IMAGING VIA CAPSULE N/A 06/04/2020    Procedure: IMAGING PROCEDURE, GI TRACT, INTRALUMINAL, VIA CAPSULE;  Surgeon: Jose Britton MD;  Location: [facility];  Service: Endoscopy;  Laterality: N/A;    KNEE SURGERY  06/06/2013    right knee tear Dr Iverson     LAPAROSCOPIC CHOLECYSTECTOMY N/A 09/17/2020    Procedure: CHOLECYSTECTOMY, LAPAROSCOPIC;  Surgeon: Forrest Veronica MD;  Location: [facility];  Service: General;  Laterality: N/A;    LUNG LOBECTOMY  1966    right middle lobectomy, due to Tb    OOPHORECTOMY      SHOULDER SURGERY      francis     THYROIDECTOMY Bilateral 05/19/2021    Procedure: THYROIDECTOMY;  Surgeon: Jamia Amezquita MD;  Location: [facility] OR 2ND FLR;  Service: General;  Laterality: Bilateral;    THYROIDECTOMY Bilateral 09/13/2021    Procedure: THYROIDECTOMY WITH INTRA-OP PTH;  Surgeon: Ryan Torres MD;  Location: [facility] OR 2ND FLR;  Service: ENT;  Laterality: Bilateral;  NIM tube  Intraop PTH     Review of patient's  allergies indicates:  No Known Allergies    Social History     Socioeconomic History    Marital status:    Tobacco Use    Smoking status: Never    Smokeless tobacco: Never   Substance and Sexual Activity    Alcohol use: Not Currently     Comment: stopped 2019    Drug use: No    Sexual activity: Not Currently     Social Determinants of Health     Financial Resource Strain: Low Risk     Difficulty of Paying Living Expenses: Not hard at all   Food Insecurity: No Food Insecurity    Worried About Running Out of Food in the Last Year: Never true    Ran Out of Food in the Last Year: Never true   Transportation Needs: No Transportation Needs    Lack of Transportation (Medical): No    Lack of Transportation (Non-Medical): No   Physical Activity: Sufficiently Active    Days of Exercise per Week: 3 days    Minutes of Exercise per Session: 60 min   Stress: No Stress Concern Present    Feeling of Stress : Not at all   Social Connections: Moderately Integrated    Frequency of Communication with Friends and Family: Three times a week    Frequency of Social Gatherings with Friends and Family: Three times a week    Attends Jainism Services: More than 4 times per year    Active Member of Clubs or Organizations: No    Attends Club or Organization Meetings: Never    Marital Status:    Housing Stability: Low Risk     Unable to Pay for Housing in the Last Year: No    Number of Places Lived in the Last Year: 1    Unstable Housing in the Last Year: No       Family History   Problem Relation Age of Onset    Colon cancer Other     Cancer Mother     Hypertension Mother     Hyperlipidemia Mother     Cancer Brother     Hypertension Father     Emphysema Father     Diabetes Son     Hypertension Son     Alcohol abuse Son     Diabetes Maternal Aunt     Alzheimer's disease Maternal Uncle     Cancer Maternal Grandmother     No Known Problems Daughter     Dementia Sister     Alzheimer's disease Sister     Breast cancer Sister 60     Obesity Paternal Uncle     Prostate cancer Other     Cancer Brother     Melanoma Neg Hx     Psoriasis Neg Hx     Lupus Neg Hx     Eczema Neg Hx     Amblyopia Neg Hx     Blindness Neg Hx     Cataracts Neg Hx     Glaucoma Neg Hx     Macular degeneration Neg Hx     Retinal detachment Neg Hx     Strabismus Neg Hx     Stroke Neg Hx     Thyroid cancer Neg Hx     Colon polyps Neg Hx     Ulcerative colitis Neg Hx     Stomach cancer Neg Hx     Rectal cancer Neg Hx        Medications  Current Outpatient Medications on File Prior to Visit   Medication Sig Dispense Refill Last Dose    amLODIPine (NORVASC) 2.5 MG tablet Take 1 tablet (2.5 mg total) by mouth once daily. 90 tablet 2 Taking    artificial tears (ISOPTO TEARS) 0.5 % ophthalmic solution 1 drop as needed.   Taking    blood sugar diagnostic (BLOOD GLUCOSE TEST) Strp True Metrix glucose strips check once daily 100 each 3 Taking    blood-glucose meter kit True Metrix glucometer check glucose once daily 1 each 0 Taking    coQ10, ubiquinol, 100 mg Cap Take 100 mg by mouth once daily.   Taking    doxepin (SINEQUAN) 25 MG capsule Take 25 mg by mouth every evening.   Taking    ergocalciferol (ERGOCALCIFEROL) 50,000 unit Cap Take 1 capsule (50,000 Units total) by mouth every 30 days. 3 capsule 3 Taking    levothyroxine (SYNTHROID) 75 MCG tablet Take 1 tablet (75 mcg total) by mouth before breakfast. Except half a tablet on Sundays 90 tablet 3 Taking    losartan (COZAAR) 100 MG tablet Take 1 tablet (100 mg total) by mouth once daily. 90 tablet 3 Taking    rosuvastatin (CRESTOR) 10 MG tablet Take 1 tablet (10 mg total) by mouth once daily. 90 tablet 4 Taking    calcium carbonate (TUMS) 200 mg calcium (500 mg) chewable tablet Chew and swallow 2 tablets (1,000 mg total) by mouth 3 (three) times daily. for 14 days (Patient not taking: Reported on 7/6/2023) 100 tablet 0 Not Taking    conjugated estrogens (PREMARIN) vaginal cream Place 0.5 g vaginally once daily AND 0.5 g twice a  "week. (Patient not taking: Reported on 7/6/2023) 30 g 7 Not Taking    gabapentin (NEURONTIN) 300 MG capsule Take 300 mg by mouth 3 (three) times daily.   Not Taking       Review of Systems   Constitutional: Negative for appetite change, fever and unexpected weight change.   Respiratory: Negative for cough and shortness of breath.    Cardiovascular: Negative for chest pain and palpitations.   Gastrointestinal: Negative for abdominal distention, constipation, nausea and vomiting.   Genitourinary: Negative for dyspareunia, dysuria, hematuria and pelvic pain.        GYN ROS per HPI.   Musculoskeletal: Negative for gait problem and myalgias.   Skin: Negative for rash.   Neurological: Negative for dizziness, light-headedness and headaches.   Psychiatric/Behavioral: The patient is not nervous/anxious.      Objective:     BP (!) 123/58 (BP Location: Right arm, Patient Position: Sitting, BP Method: Medium (Automatic))   Pulse 70   Ht 5' 6" (1.676 m)   Wt 72.3 kg (159 lb 6.3 oz)   BMI 25.73 kg/m²     Physical Exam  Genitourinary:     Labia:         Right: No rash or tenderness.         Left: No rash or tenderness.            Assessment:     1. Sebaceous cyst      Plan:     1. Sebaceous cyst      Follow up for cysts removal, As needed or 2 weeks following surgery.  Follow-up sooner if needed.     Discuss physical exam findings with the patient. I suspect these are sebaceous cyst which are generally benign. They're typically only removed if they are bothersome to the patient.    Discussed case with Dr. Beach. He examined the patient. She is interested in having them surgically removed.   See his addendum note below.       Alka Felipe PA-C  7/6/2023    Addendum:    The patient was seen and evaluated as above.  Findings of 3 probable sebaceous cyst were noted  Options were reviewed and the patient would like the areas excised.    The above was reviewed and discussed with the patient.    Conservative, medical, and surgical " interventions were discussed, and the patient would like to proceed with surgical intervention.  She will be scheduled for an EXCISION OF VULVAR CYST AND OTHER INDICATED PROCEDURES    The pros, cons, risks, benefits, alternatives, and indications of the surgical procedure were discussed in detail with the patient.  We discussed the possibility of allergic reactions, bleeding, infection, scarring, and damage to other abdominal pelvic organs.  Issues unique to this procedure were discussed.    The patient's questions regarding above were answered and she agrees with this plan.  The patient was provided with the informed consent form and given times reviewed the form.  Questions were answered and consent was obtained

## 2023-07-11 DIAGNOSIS — N90.89 VULVAR LESION: ICD-10-CM

## 2023-07-11 DIAGNOSIS — N90.7 LABIAL CYST: Primary | ICD-10-CM

## 2023-07-11 RX ORDER — SODIUM CHLORIDE 9 MG/ML
INJECTION, SOLUTION INTRAVENOUS CONTINUOUS
Status: CANCELLED | OUTPATIENT
Start: 2023-07-11

## 2023-07-11 RX ORDER — MUPIROCIN 20 MG/G
OINTMENT TOPICAL
Status: CANCELLED | OUTPATIENT
Start: 2023-07-11

## 2023-07-12 ENCOUNTER — TELEPHONE (OUTPATIENT)
Dept: OBSTETRICS AND GYNECOLOGY | Facility: CLINIC | Age: 78
End: 2023-07-12
Payer: MEDICARE

## 2023-07-12 NOTE — TELEPHONE ENCOUNTER
Contacted pt and she asked to reschedule procedure on 8/4.  Will get procedure rescheduled and update pt once completed.

## 2023-07-12 NOTE — TELEPHONE ENCOUNTER
----- Message from Isabel Bowers sent at 7/12/2023  4:03 PM CDT -----  Regarding: advise  Contact: Patient  Type: Needs Medical Advice  Who Called:  Patient    Symptoms (please be specific):  7/24/23    How long has patient had these symptoms:      Pharmacy name and phone #:      Best Call Back Number: [phone]    Additional Information: Patient states she has another apt on the 24th and needs to be r/s. Please call to advise thanks!

## 2023-07-13 ENCOUNTER — TELEPHONE (OUTPATIENT)
Dept: PAIN MEDICINE | Facility: CLINIC | Age: 78
End: 2023-07-13
Payer: MEDICARE

## 2023-07-13 NOTE — TELEPHONE ENCOUNTER
----- Message from Remedios Shah sent at 7/13/2023  1:52 PM CDT -----  Regarding: advice  Contact: Patient  Type: Needs Medical Advice  Who Called:  Patient   Symptoms (please be specific):    How long has patient had these symptoms:    Pharmacy name and phone #:    Best Call Back Number: [phone] (home)     Additional Information: Patient stated that she would like to get the time of her upcoming procedure with doctor. Please contact patient to advise. Thanks!

## 2023-07-17 NOTE — TELEPHONE ENCOUNTER
Pt notified procedure has been scheduled for 8/4/23 and she voiced understanding.  Post op appt scheduled and questions answered. Pt voiced understanding.

## 2023-07-18 ENCOUNTER — OFFICE VISIT (OUTPATIENT)
Dept: OTOLARYNGOLOGY | Facility: CLINIC | Age: 78
End: 2023-07-18
Payer: MEDICARE

## 2023-07-18 VITALS
BODY MASS INDEX: 25.74 KG/M2 | HEART RATE: 68 BPM | DIASTOLIC BLOOD PRESSURE: 77 MMHG | WEIGHT: 159.38 LBS | SYSTOLIC BLOOD PRESSURE: 155 MMHG

## 2023-07-18 DIAGNOSIS — C73 THYROID CANCER: Chronic | ICD-10-CM

## 2023-07-18 PROCEDURE — 99213 PR OFFICE/OUTPT VISIT, EST, LEVL III, 20-29 MIN: ICD-10-PCS | Mod: S$GLB,,, | Performed by: OTOLARYNGOLOGY

## 2023-07-18 PROCEDURE — 99999 PR PBB SHADOW E&M-EST. PATIENT-LVL III: CPT | Mod: PBBFAC,,, | Performed by: OTOLARYNGOLOGY

## 2023-07-18 PROCEDURE — 99999 PR PBB SHADOW E&M-EST. PATIENT-LVL III: ICD-10-PCS | Mod: PBBFAC,,, | Performed by: OTOLARYNGOLOGY

## 2023-07-18 PROCEDURE — 3072F PR LOW RISK FOR RETINOPATHY: ICD-10-PCS | Mod: CPTII,S$GLB,, | Performed by: OTOLARYNGOLOGY

## 2023-07-18 PROCEDURE — 3072F LOW RISK FOR RETINOPATHY: CPT | Mod: CPTII,S$GLB,, | Performed by: OTOLARYNGOLOGY

## 2023-07-18 PROCEDURE — 1126F PR PAIN SEVERITY QUANTIFIED, NO PAIN PRESENT: ICD-10-PCS | Mod: CPTII,S$GLB,, | Performed by: OTOLARYNGOLOGY

## 2023-07-18 PROCEDURE — 3077F SYST BP >= 140 MM HG: CPT | Mod: CPTII,S$GLB,, | Performed by: OTOLARYNGOLOGY

## 2023-07-18 PROCEDURE — 3077F PR MOST RECENT SYSTOLIC BLOOD PRESSURE >= 140 MM HG: ICD-10-PCS | Mod: CPTII,S$GLB,, | Performed by: OTOLARYNGOLOGY

## 2023-07-18 PROCEDURE — 1159F PR MEDICATION LIST DOCUMENTED IN MEDICAL RECORD: ICD-10-PCS | Mod: CPTII,S$GLB,, | Performed by: OTOLARYNGOLOGY

## 2023-07-18 PROCEDURE — 1126F AMNT PAIN NOTED NONE PRSNT: CPT | Mod: CPTII,S$GLB,, | Performed by: OTOLARYNGOLOGY

## 2023-07-18 PROCEDURE — 99213 OFFICE O/P EST LOW 20 MIN: CPT | Mod: S$GLB,,, | Performed by: OTOLARYNGOLOGY

## 2023-07-18 PROCEDURE — 1159F MED LIST DOCD IN RCRD: CPT | Mod: CPTII,S$GLB,, | Performed by: OTOLARYNGOLOGY

## 2023-07-18 PROCEDURE — 3078F DIAST BP <80 MM HG: CPT | Mod: CPTII,S$GLB,, | Performed by: OTOLARYNGOLOGY

## 2023-07-18 PROCEDURE — 99499 UNLISTED E&M SERVICE: CPT | Mod: S$GLB,,, | Performed by: OTOLARYNGOLOGY

## 2023-07-18 PROCEDURE — 3078F PR MOST RECENT DIASTOLIC BLOOD PRESSURE < 80 MM HG: ICD-10-PCS | Mod: CPTII,S$GLB,, | Performed by: OTOLARYNGOLOGY

## 2023-07-18 NOTE — PROGRESS NOTES
[facility]Y [facility]IC    Subjective:       Patient ID: Erica Masterson is a 77 y.o. female.    Chief Complaint: 6 month f/u    Follow-up  Associated symptoms include arthralgias, myalgias and neck pain. Pertinent negatives include no abdominal pain, chest pain, chills, congestion, coughing, fatigue, fever, headaches, nausea, numbness, rash, sore throat, vomiting or weakness.   Oncology History:  Oncology History   Thyroid cancer   5/19/2021 Initial Diagnosis    Thyroid cancer     5/19/2021 Surgery    Total thyroidectomy (Dr. Amezquita)     8/10/2021 Cancer Staged    Staging form: Thyroid - Differentiated, AJCC 8th Edition  - Clinical stage from 8/10/2021: Stage III (cT4a, cNX, cM0, Age at diagnosis: >= 55 years)     9/13/2021 Surgery    1) Completion total thyroidectomy with bilateral paratracheal and superior mediastinal dissection  2) Left modified neck dissection of levels II-Vb       9/27/2021 Cancer Staged    Staging form: Thyroid - Differentiated, AJCC 8th Edition  - Pathologic stage from 9/27/2021: Stage III (pT4a, pN1b, cM0, Age at diagnosis: >= 55 years)     12/15/2021 -  Radiation Therapy    Treating physician: Dr. Landrum, Dr. Yepez  Total Dose: 100 mCi COOPER         Erica Masterson is a 77 y.o. female with thyroid carcinoma s/p surgery and revision surgery and now COOEPR on 12/15/21. Her post-COOPER scan was clear of metastatic disease, with only expected minimal uptake in the thyroid bed.     She reports today that she is doing much better and actually has no complaints. Her neck tightness is better - she has been doing yoga. Her swallowing is improved. She is quite active and is even exercising with a senior group. She is breathing well and swallowing well. 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 Garcia - her voice is normal unless she talks for a long time or sings.     With   Lucita's departure, she will be seeing one the endocrine APPs in 6 months. She just had a PET-CT scan and labs with Dr. Landrum because her Tg has not declined.     Past Medical History:   Diagnosis Date    Adenomatous polyp of colon 3/26/2012    Allergy     Anxiety     Cancer     Cataract     Colon polyp     Degenerative arthritis of knee 04/03/2012    Diverticulosis     Encounter for blood transfusion     GERD (gastroesophageal reflux disease)     History of thyroid cancer 2021    Hyperlipidemia     Hypertension     Lateral meniscal tear 5/20/2013    Multinodular goiter 03/26/2012    Myopathy, unspecified 01/18/2010    Tuberculosis        Past Surgical History:   Procedure Laterality Date    BREAST BIOPSY Left 2015    benign    CHOLECYSTECTOMY      COLONOSCOPY  12/02/2015    Dr. Britton, multiple polyps, recheck five years-three years if more than 2 polyps are adenomatous    COLONOSCOPY N/A 12/02/2015    COLONOSCOPY N/A 03/20/2019    Procedure: COLONOSCOPY;  Surgeon: Jose Britton MD;  Location: [facility];  Service: Endoscopy;  Laterality: N/A;    COLONOSCOPY N/A 05/20/2020    Dr. Britton; internal hemorrhoids; diverticulosis; polyps removed; repeat in 3 years    CYSTOSCOPY N/A 08/26/2020    Procedure: CYSTOSCOPY;  Surgeon: Cahrlotte Walters Jr., MD;  Location: [facility] OR;  Service: Urology;  Laterality: N/A;    DILATION AND CURETTAGE OF UTERUS      DISSECTION OF NECK Left 09/13/2021    Procedure: DISSECTION, NECK;  Surgeon: Ryan Torres MD;  Location: [facility] 2ND FLR;  Service: ENT;  Laterality: Left;    ESOPHAGEAL MANOMETRY WITH MEASUREMENT OF IMPEDANCE N/A 08/22/2022    Procedure: MANOMETRY, ESOPHAGUS, WITH IMPEDANCE MEASUREMENT;  Surgeon: Pantera Mcguire MD;  Location: [facility] (4TH FLR);  Service: Endoscopy;  Laterality: N/A;  8/4 fully vaccinated; instructions mailed-st    ESOPHAGOGASTRODUODENOSCOPY N/A 05/20/2020    Dr. Britton; small hiatal hernia; gastritis; 8 gastric polyps removed     ESOPHAGOGASTRODUODENOSCOPY N/A 04/01/2022    Procedure: EGD (ESOPHAGOGASTRODUODENOSCOPY);  Surgeon: Jose Britton MD;  Location: [facility] ENDO;  Service: Endoscopy;  Laterality: N/A;    FOOT SURGERY      HYSTERECTOMY      TVH/BSO > 20 years    INTRALUMINAL GASTROINTESTINAL TRACT IMAGING VIA CAPSULE N/A 06/04/2020    Procedure: IMAGING PROCEDURE, GI TRACT, INTRALUMINAL, VIA CAPSULE;  Surgeon: Jose Britton MD;  Location: [facility] ENDO;  Service: Endoscopy;  Laterality: N/A;    KNEE SURGERY  06/06/2013    right knee tear Dr Iverson     LAPAROSCOPIC CHOLECYSTECTOMY N/A 09/17/2020    Procedure: CHOLECYSTECTOMY, LAPAROSCOPIC;  Surgeon: Forrest Veronica MD;  Location: [facility] OR;  Service: General;  Laterality: N/A;    LUNG LOBECTOMY  1966    right middle lobectomy, due to Tb    OOPHORECTOMY      SHOULDER SURGERY      francis     THYROIDECTOMY Bilateral 05/19/2021    Procedure: THYROIDECTOMY;  Surgeon: Jamia Amezquita MD;  Location: [facility] OR 2ND FLR;  Service: General;  Laterality: Bilateral;    THYROIDECTOMY Bilateral 09/13/2021    Procedure: THYROIDECTOMY WITH INTRA-OP PTH;  Surgeon: Ryan Torres MD;  Location: [facility] OR 2ND FLR;  Service: ENT;  Laterality: Bilateral;  NIM tube  Intraop PTH       Current Outpatient Medications:     amLODIPine (NORVASC) 2.5 MG tablet, Take 1 tablet (2.5 mg total) by mouth once daily., Disp: 90 tablet, Rfl: 2    artificial tears (ISOPTO TEARS) 0.5 % ophthalmic solution, 1 drop as needed., Disp: , Rfl:     blood sugar diagnostic (BLOOD GLUCOSE TEST) Strp, True Metrix glucose strips check once daily, Disp: 100 each, Rfl: 3    blood-glucose meter kit, True Metrix glucometer check glucose once daily, Disp: 1 each, Rfl: 0    calcium carbonate (TUMS) 200 mg calcium (500 mg) chewable tablet, Chew and swallow 2 tablets (1,000 mg total) by mouth 3 (three) times daily. for 14 days (Patient not taking: Reported on 7/6/2023), Disp: 100 tablet, Rfl: 0    conjugated estrogens (PREMARIN) vaginal cream,  Place 0.5 g vaginally once daily AND 0.5 g twice a week. (Patient not taking: Reported on 7/6/2023), Disp: 30 g, Rfl: 7    coQ10, ubiquinol, 100 mg Cap, Take 100 mg by mouth once daily., Disp: , Rfl:     doxepin (SINEQUAN) 25 MG capsule, Take 25 mg by mouth every evening., Disp: , Rfl:     ergocalciferol (ERGOCALCIFEROL) 50,000 unit Cap, Take 1 capsule (50,000 Units total) by mouth every 30 days., Disp: 3 capsule, Rfl: 3    gabapentin (NEURONTIN) 300 MG capsule, Take 300 mg by mouth 3 (three) times daily., Disp: , Rfl:     levothyroxine (SYNTHROID) 75 MCG tablet, Take 1 tablet (75 mcg total) by mouth before breakfast. Except half a tablet on Sundays, Disp: 90 tablet, Rfl: 3    losartan (COZAAR) 100 MG tablet, Take 1 tablet (100 mg total) by mouth once daily., Disp: 90 tablet, Rfl: 3    rosuvastatin (CRESTOR) 10 MG tablet, Take 1 tablet (10 mg total) by mouth once daily., Disp: 90 tablet, Rfl: 4    Review of patient's allergies indicates:  No Known Allergies    Social History     Socioeconomic History    Marital status:    Tobacco Use    Smoking status: Never    Smokeless tobacco: Never   Substance and Sexual Activity    Alcohol use: Not Currently     Comment: stopped 2019    Drug use: No    Sexual activity: Not Currently     Social Determinants of Health     Financial Resource Strain: Low Risk     Difficulty of Paying Living Expenses: Not hard at all   Food Insecurity: No Food Insecurity    Worried About Running Out of Food in the Last Year: Never true    Ran Out of Food in the Last Year: Never true   Transportation Needs: No Transportation Needs    Lack of Transportation (Medical): No    Lack of Transportation (Non-Medical): No   Physical Activity: Sufficiently Active    Days of Exercise per Week: 3 days    Minutes of Exercise per Session: 60 min   Stress: No Stress Concern Present    Feeling of Stress : Not at all   Social Connections: Moderately Integrated    Frequency of Communication with Friends and  Family: Three times a week    Frequency of Social Gatherings with Friends and Family: Three times a week    Attends Temple Services: More than 4 times per year    Active Member of Clubs or Organizations: No    Attends Club or Organization Meetings: Never    Marital Status:    Housing Stability: Low Risk     Unable to Pay for Housing in the Last Year: No    Number of Places Lived in the Last Year: 1    Unstable Housing in the Last Year: No       Family History   Problem Relation Age of Onset    Colon cancer Other     Cancer Mother     Hypertension Mother     Hyperlipidemia Mother     Cancer Brother     Hypertension Father     Emphysema Father     Diabetes Son     Hypertension Son     Alcohol abuse Son     Diabetes Maternal Aunt     Alzheimer's disease Maternal Uncle     Cancer Maternal Grandmother     No Known Problems Daughter     Dementia Sister     Alzheimer's disease Sister     Breast cancer Sister 60    Obesity Paternal Uncle     Prostate cancer Other     Cancer Brother     Melanoma Neg Hx     Psoriasis Neg Hx     Lupus Neg Hx     Eczema Neg Hx     Amblyopia Neg Hx     Blindness Neg Hx     Cataracts Neg Hx     Glaucoma Neg Hx     Macular degeneration Neg Hx     Retinal detachment Neg Hx     Strabismus Neg Hx     Stroke Neg Hx     Thyroid cancer Neg Hx     Colon polyps Neg Hx     Ulcerative colitis Neg Hx     Stomach cancer Neg Hx     Rectal cancer Neg Hx        Review of Systems   Constitutional:  Negative for activity change, appetite change, chills, fatigue, fever and unexpected weight change.   HENT:  Positive for voice change (only when she talks for a long time or sings). Negative for congestion, dental problem, drooling, ear discharge, ear pain, facial swelling, hearing loss, mouth sores, nosebleeds, postnasal drip, rhinorrhea, sinus pressure, sneezing, sore throat, tinnitus and trouble swallowing.    Eyes:  Negative for pain and visual disturbance.   Respiratory:  Negative for cough, choking and  shortness of breath.    Cardiovascular:  Negative for chest pain, palpitations and leg swelling.   Gastrointestinal:  Negative for abdominal pain, constipation (she is taking some OTC meds at times for this), diarrhea, nausea and vomiting.   Endocrine: Negative for cold intolerance and heat intolerance.   Genitourinary:  Negative for difficulty urinating, dysuria and hematuria.   Musculoskeletal:  Positive for arthralgias, back pain, myalgias, neck pain and neck stiffness. Negative for gait problem.   Skin:  Negative for rash and wound.   Allergic/Immunologic: Negative for environmental allergies and immunocompromised state.   Neurological:  Negative for dizziness, facial asymmetry, speech difficulty, weakness, light-headedness, numbness and headaches.   Hematological:  Negative for adenopathy. Does not bruise/bleed easily.   Psychiatric/Behavioral:  Negative for dysphoric mood. The patient is not nervous/anxious.      Objective:     Physical Exam  Vitals reviewed.   Constitutional:       General: She is not in acute distress.     Appearance: She is well-developed.   HENT:      Head: Normocephalic and atraumatic.      Jaw: No trismus.      Salivary Glands: Right salivary gland is not diffusely enlarged or tender. Left salivary gland is not diffusely enlarged or tender.      Comments: Salivary glands - there are no lesions or asymmetric findings in the submandibular or parotid glands     Right Ear: Hearing, tympanic membrane, ear canal and external ear normal.      Left Ear: Hearing, tympanic membrane, ear canal and external ear normal.      Nose: No nasal deformity or rhinorrhea.      Mouth/Throat:      Mouth: No oral lesions.      Tongue: No lesions.      Palate: No mass and lesions.      Pharynx: Uvula midline.   Eyes:      General: Lids are normal.      Extraocular Movements: Extraocular movements intact.      Conjunctiva/sclera:      Right eye: Right conjunctiva is not injected. No chemosis.     Left eye: Left  conjunctiva is not injected. No chemosis.  Neck:      Thyroid: No thyroid mass or thyromegaly.      Vascular: No JVD.      Trachea: Phonation normal. No tracheal deviation.     Pulmonary:      Effort: Pulmonary effort is normal. No accessory muscle usage or respiratory distress.      Breath sounds: No stridor.   Musculoskeletal:         General: No tenderness.      Cervical back: Full passive range of motion without pain.   Lymphadenopathy:      Cervical: No cervical adenopathy.      Right cervical: No deep or posterior cervical adenopathy.     Left cervical: No deep or posterior cervical adenopathy.      Upper Body:      Right upper body: No supraclavicular adenopathy.      Left upper body: No supraclavicular adenopathy.   Skin:     Findings: No erythema, lesion or rash.      Nails: There is no clubbing.   Neurological:      Mental Status: She is alert and oriented to person, place, and time.      Cranial Nerves: No cranial nerve deficit or facial asymmetry.      Motor: No weakness.      Gait: Gait normal.   Psychiatric:         Speech: Speech normal.         Behavior: Behavior is cooperative.        PET-CT 6/6/23:  COMPARISON: I-131 scan 12/22/2021     FINDINGS:  No abnormal FDG activity.    Intracranial compartment is unremarkable. No enlarged cervical or supraclavicular lymph nodes. Calcification along anterior aspect of left submandibular gland incidentally noted. Surgical clips in the neck inferiorly suggest prior thyroidectomy and potential lymph node sampling.     Postoperative changes of right upper lobectomy are suggested. Lung show no pulmonary nodules or masses. Mild coronary artery calcifications present. No enlarged mediastinal or axillary lymph nodes.     Surgical clips in gallbladder fossa indicate cholecystectomy. Colonic diverticula are present. Uterus is been removed.     No suspicious osseous abnormality. Degenerative changes of the spine are mild.     IMPRESSION:  Negative for FDG avid  malignancy or metastatic disease.     Electronically signed by:  Alex Madsen MD  6/6/2023 4:22 PM CDT     Component      Latest Ref Rng & Units 5/22/2023 9/30/2022 9/1/2022 5/5/2022   Thyroglobulin, Tumor Marker      ng/mL 16 (H) 7.6 (H) 9.0 (H) 8.0 (H)     Component      Latest Ref Rng & Units 12/14/2021 10/20/2021 10/4/2021 7/29/2021   Thyroglobulin, Tumor Marker      ng/mL 12 (H) 9.0 (H) 12 (H) 12 (H)     Component      Latest Ref Rng & Units 6/30/2021   Thyroglobulin, Tumor Marker      ng/mL 91 (H)     Assessment & Plan:       Problem List Items Addressed This Visit       Thyroid cancer (Chronic)     She is doing well overall, but her Tg continues to rise. A PET-CT was negative, and there is a plan for repeat labs and US with the Endocrine team in 6 months. This is reasonable. Without structural disease, further surgery or COOPER is not indicated. Reassurance was provided as I endeavored to explain this to her, and she expressed understanding. We can leverage E-consults to Dr. Whitman or Dr. Marie or Dr. Boswell as her care moves forward, but she does present a complicated picture, making Endocrinology involvement essential.

## 2023-07-18 NOTE — ASSESSMENT & PLAN NOTE
She is doing well overall, but her Tg continues to rise. A PET-CT was negative, and there is a plan for repeat labs and US with the Endocrine team in 6 months. This is reasonable. Without structural disease, further surgery or COOPER is not indicated. Reassurance was provided as I endeavored to explain this to her, and she expressed understanding. We can leverage E-consults to Dr. Whitman or Dr. Marie or Dr. Boswell as her care moves forward, but she does present a complicated picture, making Endocrinology involvement essential.

## 2023-07-20 ENCOUNTER — HOSPITAL ENCOUNTER (OUTPATIENT)
Facility: HOSPITAL | Age: 78
Discharge: HOME | End: 2023-07-20
Attending: ANESTHESIOLOGY | Admitting: ANESTHESIOLOGY
Payer: MEDICARE

## 2023-07-20 DIAGNOSIS — M25.512 BILATERAL SHOULDER PAIN, UNSPECIFIED CHRONICITY: Primary | ICD-10-CM

## 2023-07-20 DIAGNOSIS — M25.511 BILATERAL SHOULDER PAIN, UNSPECIFIED CHRONICITY: Primary | ICD-10-CM

## 2023-07-20 DIAGNOSIS — M54.16 LUMBAR RADICULITIS: ICD-10-CM

## 2023-07-20 PROCEDURE — 25500020 PHARM REV CODE 255: Performed by: ANESTHESIOLOGY

## 2023-07-20 PROCEDURE — 62323 PR INJ LUMBAR/SACRAL, W/IMAGING GUIDANCE: ICD-10-PCS | Mod: ,,, | Performed by: ANESTHESIOLOGY

## 2023-07-20 PROCEDURE — A4216 STERILE WATER/SALINE, 10 ML: HCPCS | Performed by: ANESTHESIOLOGY

## 2023-07-20 PROCEDURE — 62323 NJX INTERLAMINAR LMBR/SAC: CPT | Mod: ,,, | Performed by: ANESTHESIOLOGY

## 2023-07-20 PROCEDURE — 63600175 PHARM REV CODE 636 W HCPCS: Performed by: ANESTHESIOLOGY

## 2023-07-20 PROCEDURE — 25000003 PHARM REV CODE 250: Performed by: ANESTHESIOLOGY

## 2023-07-20 PROCEDURE — 62323 NJX INTERLAMINAR LMBR/SAC: CPT | Performed by: ANESTHESIOLOGY

## 2023-07-20 RX ORDER — DEXAMETHASONE SODIUM PHOSPHATE 10 MG/ML
INJECTION INTRAMUSCULAR; INTRAVENOUS
Status: DISCONTINUED | OUTPATIENT
Start: 2023-07-20 | End: 2023-07-20 | Stop reason: HOSPADM

## 2023-07-20 RX ORDER — SODIUM CHLORIDE 9 MG/ML
INJECTION, SOLUTION INTRAMUSCULAR; INTRAVENOUS; SUBCUTANEOUS
Status: DISCONTINUED | OUTPATIENT
Start: 2023-07-20 | End: 2023-07-20 | Stop reason: HOSPADM

## 2023-07-20 RX ORDER — SODIUM CHLORIDE, SODIUM LACTATE, POTASSIUM CHLORIDE, CALCIUM CHLORIDE 600; 310; 30; 20 MG/100ML; MG/100ML; MG/100ML; MG/100ML
INJECTION, SOLUTION INTRAVENOUS ONCE AS NEEDED
Status: DISCONTINUED | OUTPATIENT
Start: 2023-07-20 | End: 2024-02-05

## 2023-07-20 RX ORDER — LIDOCAINE HYDROCHLORIDE 10 MG/ML
INJECTION, SOLUTION EPIDURAL; INFILTRATION; INTRACAUDAL; PERINEURAL
Status: DISCONTINUED | OUTPATIENT
Start: 2023-07-20 | End: 2023-07-20 | Stop reason: HOSPADM

## 2023-07-20 NOTE — DISCHARGE SUMMARY
[facility] - Periop Services  Discharge Note  Short Stay    Procedure(s) (LRB):  Injection-steroid-epidural-lumbar (N/A)      OUTCOME: Patient tolerated treatment/procedure well without complication and is now ready for discharge.    DISPOSITION: Home or Self Care    FINAL DIAGNOSIS:  <principal problem not specified>    FOLLOWUP: In clinic    DISCHARGE INSTRUCTIONS:    Discharge Procedure Orders   Notify your health care provider if you experience any of the following:  temperature >100.4     Notify your health care provider if you experience any of the following:  severe uncontrolled pain     Notify your health care provider if you experience any of the following:  redness, tenderness, or signs of infection (pain, swelling, redness, odor or green/yellow discharge around incision site)     Activity as tolerated        TIME SPENT ON DISCHARGE:   30 minutes

## 2023-07-20 NOTE — OP NOTE
PROCEDURE DATE: 7/20/2023    Procedure:   Interlaminar epidural steroid injection at L5-S1 under fluoroscopic guidance.    Diagnosis: lUMBAR radiculitis  pOSTOP DIAGNOSIS: sAME    Physician: Julián Chiang M.D.    Medications injected:10 mg dexamethasone with 4 ml of preservative free NaCl    Local anesthetic injected:    Lidocaine 1% 2 ml total    Sedation Medications: None    Estimated blood loss:  None    Complications:  None    Technique:  Time-out taken to identify patient and procedure prior to starting the procedure.  With the patient laying in a prone position, the area was prepped and draped in the usual sterile fashion using ChloraPrep and a fenestrated drape.  After determining the target level with an AP fluoroscopic view, local anesthetic was given using a 25-gauge 1.5 inch needle by raising a wheal and then infiltrating toward the interlaminar entry space.  A 3.5inch 20-gauge Touhy needle was introduced under AP fluoroscopic guidance to the interlaminar space of L5-S1. Once the trajectory was established, the needle was visualized in the lateral view and advanced using loss of resistance technique. Once in the desired position, 1ml contrast was injected to confirm placement and there was no vascular uptake nor intrathecal spread.  The medication was then injected slowly. The patient tolerated the procedure well.      The patient was monitored after the procedure.   They were given post-procedure and discharge instructions to follow at home.  The patient was discharged in a stable condition.

## 2023-07-20 NOTE — PLAN OF CARE
Patient 's upcoming appointments reviewed. She has no additional questions. She was able to dress her self and walk to waiting room.

## 2023-07-24 ENCOUNTER — HOSPITAL ENCOUNTER (OUTPATIENT)
Dept: RADIOLOGY | Facility: HOSPITAL | Age: 78
Discharge: HOME | End: 2023-07-24
Attending: FAMILY MEDICINE
Payer: MEDICARE

## 2023-07-24 VITALS
SYSTOLIC BLOOD PRESSURE: 142 MMHG | BODY MASS INDEX: 25.61 KG/M2 | HEART RATE: 71 BPM | RESPIRATION RATE: 18 BRPM | WEIGHT: 159.38 LBS | TEMPERATURE: 98 F | HEIGHT: 66 IN | OXYGEN SATURATION: 97 % | DIASTOLIC BLOOD PRESSURE: 67 MMHG

## 2023-07-24 DIAGNOSIS — R92.8 ABNORMALITY OF LEFT BREAST ON SCREENING MAMMOGRAM: Primary | ICD-10-CM

## 2023-07-24 DIAGNOSIS — R92.8 ABNORMALITY OF LEFT BREAST ON SCREENING MAMMOGRAM: ICD-10-CM

## 2023-07-24 PROCEDURE — 77065 MAMMO DIGITAL DIAGNOSTIC LEFT WITH TOMO: ICD-10-PCS | Mod: 26,LT,, | Performed by: RADIOLOGY

## 2023-07-24 PROCEDURE — 77065 DX MAMMO INCL CAD UNI: CPT | Mod: TC,LT

## 2023-07-24 PROCEDURE — 77061 MAMMO DIGITAL DIAGNOSTIC LEFT WITH TOMO: ICD-10-PCS | Mod: 26,LT,, | Performed by: RADIOLOGY

## 2023-07-24 PROCEDURE — 76642 US BREAST LEFT LIMITED: ICD-10-PCS | Mod: 26,LT,, | Performed by: RADIOLOGY

## 2023-07-24 PROCEDURE — 76642 ULTRASOUND BREAST LIMITED: CPT | Mod: TC,LT

## 2023-07-24 PROCEDURE — 77065 DX MAMMO INCL CAD UNI: CPT | Mod: 26,LT,, | Performed by: RADIOLOGY

## 2023-07-24 PROCEDURE — 76642 ULTRASOUND BREAST LIMITED: CPT | Mod: 26,LT,, | Performed by: RADIOLOGY

## 2023-07-24 PROCEDURE — 77061 BREAST TOMOSYNTHESIS UNI: CPT | Mod: 26,LT,, | Performed by: RADIOLOGY

## 2023-07-31 NOTE — TELEPHONE ENCOUNTER
----- Message from Lottie Ramirez sent at 4/23/2020  4:39 PM CDT -----  Type: Needs Medical Advice  Who Called:  patient  Symptoms (please be specific):  Dry mouth  Best Call Back Number: [phone]  Additional Information: please give call back. Tried to setup VV with patient.      Niacinamide Counseling: I recommended taking niacin or niacinamide, also know as vitamin B3, twice daily. Recent evidence suggests that taking vitamin B3 (500 mg twice daily) can reduce the risk of actinic keratoses and non-melanoma skin cancers. Side effects of vitamin B3 include flushing and headache.

## 2023-08-03 ENCOUNTER — TELEPHONE (OUTPATIENT)
Dept: SPINE | Facility: CLINIC | Age: 78
End: 2023-08-03
Payer: MEDICARE

## 2023-08-03 ENCOUNTER — ANESTHESIA EVENT (OUTPATIENT)
Dept: SURGERY | Facility: HOSPITAL | Age: 78
End: 2023-08-03
Payer: MEDICARE

## 2023-08-04 ENCOUNTER — HOSPITAL ENCOUNTER (OUTPATIENT)
Facility: HOSPITAL | Age: 78
Discharge: HOME | End: 2023-08-04
Attending: OBSTETRICS & GYNECOLOGY | Admitting: OBSTETRICS & GYNECOLOGY
Payer: MEDICARE

## 2023-08-04 ENCOUNTER — ANESTHESIA (OUTPATIENT)
Dept: SURGERY | Facility: HOSPITAL | Age: 78
End: 2023-08-04
Payer: MEDICARE

## 2023-08-04 DIAGNOSIS — N90.7 LABIAL CYST: ICD-10-CM

## 2023-08-04 DIAGNOSIS — N90.89 VULVAR LESION: ICD-10-CM

## 2023-08-04 PROCEDURE — 94799 UNLISTED PULMONARY SVC/PX: CPT

## 2023-08-04 PROCEDURE — 71000039 HC RECOVERY, EACH ADD'L HOUR: Performed by: OBSTETRICS & GYNECOLOGY

## 2023-08-04 PROCEDURE — 37000009 HC ANESTHESIA EA ADD 15 MINS: Performed by: OBSTETRICS & GYNECOLOGY

## 2023-08-04 PROCEDURE — 71000033 HC RECOVERY, INTIAL HOUR: Performed by: OBSTETRICS & GYNECOLOGY

## 2023-08-04 PROCEDURE — 25000003 PHARM REV CODE 250: Performed by: NURSE ANESTHETIST, CERTIFIED REGISTERED

## 2023-08-04 PROCEDURE — 25000003 PHARM REV CODE 250: Performed by: OBSTETRICS & GYNECOLOGY

## 2023-08-04 PROCEDURE — D9220A PRA ANESTHESIA: Mod: ANES,,, | Performed by: ANESTHESIOLOGY

## 2023-08-04 PROCEDURE — 11426 PR EXC SKIN BENIG >4 CM REMAINDR BODY: ICD-10-PCS | Mod: ,,, | Performed by: OBSTETRICS & GYNECOLOGY

## 2023-08-04 PROCEDURE — 36000705 HC OR TIME LEV I EA ADD 15 MIN: Performed by: OBSTETRICS & GYNECOLOGY

## 2023-08-04 PROCEDURE — 63600175 PHARM REV CODE 636 W HCPCS: Performed by: OBSTETRICS & GYNECOLOGY

## 2023-08-04 PROCEDURE — 71000015 HC POSTOP RECOV 1ST HR: Performed by: OBSTETRICS & GYNECOLOGY

## 2023-08-04 PROCEDURE — 25000003 PHARM REV CODE 250: Performed by: ANESTHESIOLOGY

## 2023-08-04 PROCEDURE — 36000704 HC OR TIME LEV I 1ST 15 MIN: Performed by: OBSTETRICS & GYNECOLOGY

## 2023-08-04 PROCEDURE — 11426 EXC H-F-NK-SP B9+MARG >4 CM: CPT | Mod: ,,, | Performed by: OBSTETRICS & GYNECOLOGY

## 2023-08-04 PROCEDURE — D9220A PRA ANESTHESIA: ICD-10-PCS | Mod: CRNA,,, | Performed by: NURSE ANESTHETIST, CERTIFIED REGISTERED

## 2023-08-04 PROCEDURE — D9220A PRA ANESTHESIA: Mod: CRNA,,, | Performed by: NURSE ANESTHETIST, CERTIFIED REGISTERED

## 2023-08-04 PROCEDURE — 63600175 PHARM REV CODE 636 W HCPCS: Performed by: NURSE ANESTHETIST, CERTIFIED REGISTERED

## 2023-08-04 PROCEDURE — 88304 TISSUE EXAM BY PATHOLOGIST: CPT | Mod: TC | Performed by: PATHOLOGY

## 2023-08-04 PROCEDURE — D9220A PRA ANESTHESIA: ICD-10-PCS | Mod: ANES,,, | Performed by: ANESTHESIOLOGY

## 2023-08-04 PROCEDURE — 37000008 HC ANESTHESIA 1ST 15 MINUTES: Performed by: OBSTETRICS & GYNECOLOGY

## 2023-08-04 RX ORDER — ONDANSETRON 2 MG/ML
4 INJECTION INTRAMUSCULAR; INTRAVENOUS ONCE
Status: DISCONTINUED | OUTPATIENT
Start: 2023-08-04 | End: 2023-08-04 | Stop reason: HOSPADM

## 2023-08-04 RX ORDER — FENTANYL CITRATE 50 UG/ML
INJECTION, SOLUTION INTRAMUSCULAR; INTRAVENOUS
Status: DISCONTINUED | OUTPATIENT
Start: 2023-08-04 | End: 2023-08-04

## 2023-08-04 RX ORDER — ACETAMINOPHEN 10 MG/ML
INJECTION, SOLUTION INTRAVENOUS
Status: DISCONTINUED | OUTPATIENT
Start: 2023-08-04 | End: 2023-08-04

## 2023-08-04 RX ORDER — LIDOCAINE HYDROCHLORIDE 20 MG/ML
INJECTION INTRAVENOUS
Status: DISCONTINUED | OUTPATIENT
Start: 2023-08-04 | End: 2023-08-04

## 2023-08-04 RX ORDER — SODIUM CHLORIDE, SODIUM LACTATE, POTASSIUM CHLORIDE, CALCIUM CHLORIDE 600; 310; 30; 20 MG/100ML; MG/100ML; MG/100ML; MG/100ML
INJECTION, SOLUTION INTRAVENOUS CONTINUOUS
Status: DISCONTINUED | OUTPATIENT
Start: 2023-08-04 | End: 2023-08-04 | Stop reason: HOSPADM

## 2023-08-04 RX ORDER — SODIUM CHLORIDE 9 MG/ML
INJECTION, SOLUTION INTRAVENOUS CONTINUOUS
Status: DISCONTINUED | OUTPATIENT
Start: 2023-08-04 | End: 2023-08-04 | Stop reason: HOSPADM

## 2023-08-04 RX ORDER — HYDROMORPHONE HYDROCHLORIDE 2 MG/ML
0.2 INJECTION, SOLUTION INTRAMUSCULAR; INTRAVENOUS; SUBCUTANEOUS EVERY 5 MIN PRN
Status: DISCONTINUED | OUTPATIENT
Start: 2023-08-04 | End: 2023-08-04 | Stop reason: HOSPADM

## 2023-08-04 RX ORDER — IBUPROFEN 600 MG/1
600 TABLET ORAL EVERY 6 HOURS PRN
Qty: 30 TABLET | Refills: 0 | Status: SHIPPED | OUTPATIENT
Start: 2023-08-04

## 2023-08-04 RX ORDER — OXYCODONE HYDROCHLORIDE 5 MG/1
5 TABLET ORAL
Status: DISCONTINUED | OUTPATIENT
Start: 2023-08-04 | End: 2023-08-04 | Stop reason: HOSPADM

## 2023-08-04 RX ORDER — OXYCODONE AND ACETAMINOPHEN 5; 325 MG/1; MG/1
1 TABLET ORAL EVERY 4 HOURS PRN
Qty: 10 TABLET | Refills: 0 | Status: ON HOLD | OUTPATIENT
Start: 2023-08-04 | End: 2023-11-16 | Stop reason: CLARIF

## 2023-08-04 RX ORDER — MEPERIDINE HYDROCHLORIDE 50 MG/ML
12.5 INJECTION INTRAMUSCULAR; INTRAVENOUS; SUBCUTANEOUS ONCE
Status: DISCONTINUED | OUTPATIENT
Start: 2023-08-04 | End: 2023-08-04 | Stop reason: HOSPADM

## 2023-08-04 RX ORDER — DIPHENHYDRAMINE HYDROCHLORIDE 50 MG/ML
25 INJECTION INTRAMUSCULAR; INTRAVENOUS EVERY 6 HOURS PRN
Status: DISCONTINUED | OUTPATIENT
Start: 2023-08-04 | End: 2023-08-04 | Stop reason: HOSPADM

## 2023-08-04 RX ORDER — MUPIROCIN 20 MG/G
OINTMENT TOPICAL
Status: DISCONTINUED | OUTPATIENT
Start: 2023-08-04 | End: 2023-08-04 | Stop reason: HOSPADM

## 2023-08-04 RX ORDER — DEXAMETHASONE SODIUM PHOSPHATE 4 MG/ML
INJECTION, SOLUTION INTRA-ARTICULAR; INTRALESIONAL; INTRAMUSCULAR; INTRAVENOUS; SOFT TISSUE
Status: DISCONTINUED | OUTPATIENT
Start: 2023-08-04 | End: 2023-08-04

## 2023-08-04 RX ORDER — FENTANYL CITRATE 50 UG/ML
25 INJECTION, SOLUTION INTRAMUSCULAR; INTRAVENOUS EVERY 5 MIN PRN
Status: DISCONTINUED | OUTPATIENT
Start: 2023-08-04 | End: 2023-08-04 | Stop reason: HOSPADM

## 2023-08-04 RX ORDER — BUPIVACAINE HYDROCHLORIDE 5 MG/ML
INJECTION, SOLUTION EPIDURAL; INTRACAUDAL CODE/TRAUMA/SEDATION MEDICATION
Status: DISCONTINUED | OUTPATIENT
Start: 2023-08-04 | End: 2023-08-04 | Stop reason: HOSPADM

## 2023-08-04 RX ORDER — PROPOFOL 10 MG/ML
VIAL (ML) INTRAVENOUS
Status: DISCONTINUED | OUTPATIENT
Start: 2023-08-04 | End: 2023-08-04

## 2023-08-04 RX ORDER — LIDOCAINE HYDROCHLORIDE 10 MG/ML
0.5 INJECTION, SOLUTION EPIDURAL; INFILTRATION; INTRACAUDAL; PERINEURAL ONCE
Status: DISCONTINUED | OUTPATIENT
Start: 2023-08-04 | End: 2023-08-04 | Stop reason: HOSPADM

## 2023-08-04 RX ORDER — ONDANSETRON 2 MG/ML
INJECTION INTRAMUSCULAR; INTRAVENOUS
Status: DISCONTINUED | OUTPATIENT
Start: 2023-08-04 | End: 2023-08-04

## 2023-08-04 RX ADMIN — FENTANYL CITRATE 25 MCG: 50 INJECTION, SOLUTION INTRAMUSCULAR; INTRAVENOUS at 08:08

## 2023-08-04 RX ADMIN — PROPOFOL 20 MG: 10 INJECTION, EMULSION INTRAVENOUS at 09:08

## 2023-08-04 RX ADMIN — LIDOCAINE HYDROCHLORIDE 75 MG: 20 INJECTION, SOLUTION INTRAVENOUS at 08:08

## 2023-08-04 RX ADMIN — PROPOFOL 20 MG: 10 INJECTION, EMULSION INTRAVENOUS at 08:08

## 2023-08-04 RX ADMIN — DEXAMETHASONE SODIUM PHOSPHATE 4 MG: 4 INJECTION, SOLUTION INTRAMUSCULAR; INTRAVENOUS at 09:08

## 2023-08-04 RX ADMIN — ONDANSETRON 8 MG: 2 INJECTION INTRAMUSCULAR; INTRAVENOUS at 09:08

## 2023-08-04 RX ADMIN — FENTANYL CITRATE 25 MCG: 50 INJECTION, SOLUTION INTRAMUSCULAR; INTRAVENOUS at 09:08

## 2023-08-04 RX ADMIN — PROPOFOL 30 MG: 10 INJECTION, EMULSION INTRAVENOUS at 09:08

## 2023-08-04 RX ADMIN — MUPIROCIN: 20 OINTMENT TOPICAL at 07:08

## 2023-08-04 RX ADMIN — ACETAMINOPHEN 1000 MG: 10 INJECTION, SOLUTION INTRAVENOUS at 09:08

## 2023-08-04 RX ADMIN — PROPOFOL 50 MG: 10 INJECTION, EMULSION INTRAVENOUS at 08:08

## 2023-08-04 RX ADMIN — SODIUM CHLORIDE, SODIUM GLUCONATE, SODIUM ACETATE, POTASSIUM CHLORIDE, MAGNESIUM CHLORIDE, SODIUM PHOSPHATE, DIBASIC, AND POTASSIUM PHOSPHATE: .53; .5; .37; .037; .03; .012; .00082 INJECTION, SOLUTION INTRAVENOUS at 07:08

## 2023-08-04 NOTE — INTERVAL H&P NOTE
The patient has been examined and the H&P has been reviewed:    I concur with the findings and no changes have occurred since H&P was written.    Surgery risks, benefits and alternative options discussed and understood by patient/family.    Assessment:  1. Sebaceous cyst     Plan:  EXCISION OF VULVAR CYST (X3) AND OTHER INDICATED PROCEDURES    There are no hospital problems to display for this patient.

## 2023-08-04 NOTE — PLAN OF CARE
Meets criteria for discharge. Discharged to home with . Denies nausea. Tolerating fluids. Denies pain. Steady gait. Discharge instructions reviewed and printed handout given. Ice pack given. Verbalized understanding.

## 2023-08-04 NOTE — ANESTHESIA POSTPROCEDURE EVALUATION
Anesthesia Post Evaluation    Patient: Erica Masterson    Procedure(s) Performed: Procedure(s) (LRB):  EXCISION, CYST, LABIA AND OTHER INDICATED PROCEDURES (Bilateral)    Final Anesthesia Type: MAC      Patient location during evaluation: PACU  Patient participation: Yes- Able to Participate  Level of consciousness: awake and alert and oriented  Post-procedure vital signs: reviewed and stable  Pain management: adequate  Airway patency: patent  SHERRI mitigation strategies: Multimodal analgesia  PONV status at discharge: No PONV  Anesthetic complications: no      Cardiovascular status: blood pressure returned to baseline  Respiratory status: unassisted, spontaneous ventilation and room air  Hydration status: euvolemic  Follow-up not needed.          Vitals Value Taken Time   /65 08/04/23 1038   Temp 36.6 °C (97.9 °F) 08/04/23 1030   Pulse 60 08/04/23 1042   Resp 21 08/04/23 1042   SpO2 96 % 08/04/23 1041   Vitals shown include unvalidated device data.      Event Time   Out of Recovery 10:58:00         Pain/Marco Antonio Score: Marco Antonio Score: 10 (8/4/2023 10:45 AM)  Modified Marco Antonio Score: 20 (8/4/2023 10:58 AM)

## 2023-08-04 NOTE — OP NOTE
[facility]  Department of Obstetrics & Gynecology  Operative Note      PATIENT NAME: Erica Masterson    MRN: [id number]  TODAY'S DATE: 08/04/2023  ADMIT DATE: 8/4/2023                              OPERATIVE REPORT:  8/4/2023    SURGEON: Mat Beach MD    ASSISTANT:  None    PREOPERATIVE DIAGNOSIS:   1. Bilateral vulvar cysts: Probable sebaceous/inclusion    POSTOPERATIVE DIAGNOSIS:   Same as above    PROCEDURE:   1. Excision of vulvar cyst x3    ANESTHESIA:  Mac sedation.    ESTIMATED BLOOD LOSS:  Minimal    URINE OUTPUT: 200 cc    Fluids: 500 cc Crystalloid    Specimen/pathology:  Vulvar tissue with included probable sebaceous/inclusion cysts    Complications: no    FINDINGS:   Evaluation of the perineum notes large hemorrhoids.  On evaluation of the vulva there are 3 areas of concern.  As per diagram a larger single cyst with 2 areas composed of multiple small cysts.    Physical Exam  Genitourinary:             PROCEDURE IN DETAIL:   Prior to the procedure the patient was seen and evaluated in the preoperative area.  Potential risks associated with the surgery once again reviewed discussed.  The patient and family's questions were answered.    The patient was subsequently taken to the [facility]  operating room, where MAC anesthesia was initiated by the anesthesia department.      The patient was prepped and draped in the usual sterile fashion in the dorsal lithotomy position in the [facility].  The bladder was drained of approximately 200 cc of clear urine.      At this time a time-out/safety procedure was performed with all participating parties in agreement as to the preoperative and operative plan.  Sequential compression hose were on the patient.    Attention was initially turned to area #1 as above.  The single lesion was grasped, elevated and excised free with the use of a scalpel.  Hemostasis was obtained with the Bovie.  The incision site was then closed with 3-0 Vicryl in a  running fashion.    Attention was now turned to area #2, as above.  The area was evaluated and using a scalpel an elliptical incision was performed excising the chain of multiple sized inclusion/sebaceous cysts.  Hemostasis was once again obtained with the Bovie.  The incision site was closed with the 0 Vicryl in a running fashion.    Attention was now turned to area #3, as above.  In a similar fashion the area was evaluated and an elliptical incision was made with the scalpel encompassing the chain of multiple sized inclusion sebaceous cyst.  The specimen was excised free.  Hemostasis was obtained with the Bovie and the incision site was closed with 0 Vicryl in a running fashion.    Final evaluation of the operative sites noted good hemostasis.    The patient tolerated procedure well.  Counts correct x 2 with no complications.      Mat Beach MD FACOG  Date of Service: 08/04/2023

## 2023-08-04 NOTE — DISCHARGE INSTRUCTIONS
"Discharge Instructions: After Your Surgery/Procedure  Youve just had surgery. During surgery you were given medicine called anesthesia to keep you relaxed and free of pain. After surgery you may have some pain or nausea. This is common. Here are some tips for feeling better and getting well after surgery.     Stay on schedule with your medication.   Going home  Your doctor or nurse will show you how to take care of yourself when you go home. He or she will also answer your questions. Have an adult family member or friend drive you home.      For your safety we recommend these precaution for the first 24 hours after your procedure:  Do not drive or use heavy equipment.  Do not make important decisions or sign legal papers.  Do not drink alcohol.  Have someone stay with you, if needed. He or she can watch for problems and help keep you safe.  Your concentration, balance, coordination, and judgement may be impaired for many hours after anesthesia.  Use caution when ambulating or standing up.     You may feel weak and "washed out" after anesthesia and surgery.      Subtle residual effects of general anesthesia or sedation with regional / local anesthesia can last more than 24 hours.  Rest for the remainder of the day or longer if your Doctor/Surgeon has advised you to do so.  Although you may feel normal within the first 24 hours, your reflexes and mental ability may be impaired without you realizing it.  You may feel dizzy, lightheaded or sleepy for 24 hours or longer.      Be sure to go to all follow-up visits with your doctor. And rest after your surgery for as long as your doctor tells you to.  Coping with pain  If you have pain after surgery, pain medicine will help you feel better. Take it as told, before pain becomes severe. Also, ask your doctor or pharmacist about other ways to control pain. This might be with heat, ice, or relaxation. And follow any other instructions your surgeon or nurse gives you.  Tips " for taking pain medicine  To get the best relief possible, remember these points:  Pain medicines can upset your stomach. Taking them with a little food may help.  Most pain relievers taken by mouth need at least 20 to 30 minutes to start to work.  Taking medicine on a schedule can help you remember to take it. Try to time your medicine so that you can take it before starting an activity. This might be before you get dressed, go for a walk, or sit down for dinner.  Constipation is a common side effect of pain medicines. Call your doctor before taking any medicines such as laxatives or stool softeners to help ease constipation. Also ask if you should skip any foods. Drinking lots of fluids and eating foods such as fruits and vegetables that are high in fiber can also help. Remember, do not take laxatives unless your surgeon has prescribed them.  Drinking alcohol and taking pain medicine can cause dizziness and slow your breathing. It can even be deadly. Do not drink alcohol while taking pain medicine.  Pain medicine can make you react more slowly to things. Do not drive or run machinery while taking pain medicine.  Your health care provider may tell you to take acetaminophen to help ease your pain. Ask him or her how much you are supposed to take each day. Acetaminophen or other pain relievers may interact with your prescription medicines or other over-the-counter (OTC) drugs. Some prescription medicines have acetaminophen and other ingredients. Using both prescription and OTC acetaminophen for pain can cause you to overdose. Read the labels on your OTC medicines with care. This will help you to clearly know the list of ingredients, how much to take, and any warnings. It may also help you not take too much acetaminophen. If you have questions or do not understand the information, ask your pharmacist or health care provider to explain it to you before you take the OTC medicine.  Managing nausea  Some people have an  upset stomach after surgery. This is often because of anesthesia, pain, or pain medicine, or the stress of surgery. These tips will help you handle nausea and eat healthy foods as you get better. If you were on a special food plan before surgery, ask your doctor if you should follow it while you get better. These tips may help:  Do not push yourself to eat. Your body will tell you when to eat and how much.  Start off with clear liquids and soup. They are easier to digest.  Next try semi-solid foods, such as mashed potatoes, applesauce, and gelatin, as you feel ready.  Slowly move to solid foods. Dont eat fatty, rich, or spicy foods at first.  Do not force yourself to have 3 large meals a day. Instead eat smaller amounts more often.  Take pain medicines with a small amount of solid food, such as crackers or toast, to avoid nausea.     Call your surgeon if  You still have pain an hour after taking medicine. The medicine may not be strong enough.  You feel too sleepy, dizzy, or groggy. The medicine may be too strong.  You have side effects like nausea, vomiting, or skin changes, such as rash, itching, or hives.       If you have obstructive sleep apnea  You were given anesthesia medicine during surgery to keep you comfortable and free of pain. After surgery, you may have more apnea spells because of this medicine and other medicines you were given. The spells may last longer than usual.   At home:  Keep using the continuous positive airway pressure (CPAP) device when you sleep. Unless your health care provider tells you not to, use it when you sleep, day or night. CPAP is a common device used to treat obstructive sleep apnea.  Talk with your provider before taking any pain medicine, muscle relaxants, or sedatives. Your provider will tell you about the possible dangers of taking these medicines.  © 4774-3999 [employer]. [address] [place] [address], [place] [address]. All rights reserved. This information is  not intended as a substitute for professional medical care. Always follow your healthcare professional's instructions.      Post op instructions for prevention of DVT  What is deep vein thrombosis?  Deep vein thrombosis (DVT) is the medical term for blood clots in the deep veins of the leg.  These blood clots can be dangerous.  A DVT can block a blood vessel and keep blood from getting where it needs to go.  Another problem is that the clot can travel to other parts of the body such as the lungs.  A clot that travels to the lungs is called a pulmonary embolus (PE) and can cause serious problems with breathing which can lead to death.  Am I at risk for DVT/PE?  If you are not very active, you are at risk of DVT.  Anyone confined to bed, sitting for long periods of time, recovering from surgery, etc. increases the risk of DVT.  Other risk factors are cancer diagnosis, certain medications, estrogen replacement in any form,older age, obesity, pregnancy, smoking, history of clotting disorders, and dehydration.  How will I know if I have a DVT?  Swelling in the lower leg  Pain  Warmth, redness, hardness or bulging of the vein  If you have any of these symptoms, call your doctors office right away.  Some people will not have any symptoms until the clot moves to the lungs.  What are the symptoms of a PE?  Panting, shortness of breath, or trouble breathing  Sharp, knife-like chest pain when you breathe  Coughing or coughing up blood  Rapid heartbeat  If you have any of these symptoms or get worse quickly, call 911 for emergency treatment.  How can I prevent a DVT?  Avoid long periods of inactivity and dont cross your legs--get up and walk around every hour or so.  Stay active--walking after surgery is highly encouraged.  This means you should get out of the house and walk in the neighborhood.  Going up and down stairs will not impair healing (unless advised against such activity by your doctor).    Drink plenty of  noncaffeinated, nonalcoholic fluids each day to prevent dehydration.  Wear special support stockings, if they have been advised by your doctor.  If you travel, stop at least once an hour and walk around.  Avoid smoking (assistance with stopping is available through your healthcare provider)  Always notify your doctor if you are not able to follow the post operative instructions that are given to you at the time of discharge.  It may be necessary to prescribe one of the medications available to prevent DVT.             Using an Incentive Spirometer    An incentive spirometer is a device that helps you do deep breathing exercises. These exercises expand your lungs, aid in circulation, and help prevent pneumonia. Deep breathing exercises also help you breathe better and improve the function of your lungs by:  Keeping your lungs clear  Strengthening your breathing muscles  Helping prevent respiratory complications or problems  The incentive spirometer gives you a way to take an active part in recover. A nurse or therapist will teach you breathing exercises. To do these exercises, you will breathe in through your mouth and not your nose. The incentive spirometer only works correctly if you breathe in through your mouth.  Steps to clear lungs  Step 1. Exhale normally. Then, inhale normally.  Relax and breathe out.  Step 2. Place your lips tightly around the mouthpiece.  Make sure the device is upright and not tilted.  Step 3. Inhale as much air as you can through the mouthpiece (don't breath through your nose).  Inhale slowly and deeply.  Hold your breath long enough to keep the balls or disk raised for at least 3 to 5 seconds, or as instructed by your healthcare provider.  Some spirometers have an indicator to let you know that you are breathing in too fast. If the indicator goes off, breathe in more slowly.  Step 4. Repeat the exercise regularly.  Do this exercise every hour while you're awake, or as instructed by your  healthcare provider.  If you were taught deep breathing and coughing exercises, do them regularly as instructed by your healthcare provider.

## 2023-08-04 NOTE — TRANSFER OF CARE
"Anesthesia Transfer of Care Note    Patient: Erica Masterson    Procedure(s) Performed: Procedure(s) (LRB):  EXCISION, CYST, LABIA AND OTHER INDICATED PROCEDURES (Bilateral)    Patient location: PACU    Anesthesia Type: MAC    Transport from OR: Transported from OR on 2-3 L/min O2 by NC with adequate spontaneous ventilation    Post pain: adequate analgesia    Post assessment: no apparent anesthetic complications    Post vital signs: stable    Level of consciousness: awake, alert and oriented    Nausea/Vomiting: no nausea/vomiting    Complications: none    Transfer of care protocol was followed      Last vitals:   Visit Vitals  /62 (BP Location: Left arm, Patient Position: Lying)   Pulse 71   Temp 36.6 °C (97.8 °F) (Oral)   Resp 20   Ht 5' 6" (1.676 m)   Wt 72.1 kg (158 lb 15.2 oz)   SpO2 98%   Breastfeeding No   BMI 25.66 kg/m²     "

## 2023-08-04 NOTE — PROGRESS NOTES
Preoperative H&P addendum:     Following the initial evaluation the patient requested evaluation of a 4th lesion.  On evaluation the patient has what appears to be a lipoma in the lower abdominal area.       Physical Exam  Abdominal:              This was discussed.  I do not feel it would be be appropriate to remove the lesion at this time without further evaluation and potential General surgery recommendation.      The patient's questions were answered and she is in agreement with this plan.

## 2023-08-04 NOTE — PLAN OF CARE
Patient prepared for procedure.  No questions at this time.  Family at bedside.  Belongings placed in preop cabinet.   Purse and retainer with

## 2023-08-04 NOTE — ANESTHESIA PREPROCEDURE EVALUATION
08/04/2023  Erica Masterson is a 77 y.o., female.      Pre-op Assessment    I have reviewed the Patient Summary Reports.     I have reviewed the Nursing Notes. I have reviewed the NPO Status.   I have reviewed the Medications.     Review of Systems  Anesthesia Hx:  No problems with previous Anesthesia    Social:  Non-Smoker    Hematology/Oncology:         -- Cancer in past history (thyroid CA):   Cardiovascular:   Hypertension, well controlled hyperlipidemia    Pulmonary:  Pulmonary Normal    Renal/:   Chronic Renal Disease, CRI    Hepatic/GI:   GERD    Musculoskeletal:   Arthritis     Neurological:   Neuromuscular Disease,    Endocrine:   Diabetes, well controlled Hypothyroidism    Psych:   Psychiatric History anxiety depression          Physical Exam  General: Well nourished, Cooperative, Alert and Oriented    Airway:  Mallampati: I   Mouth Opening: Normal  Neck ROM: Normal ROM        Anesthesia Plan  Type of Anesthesia, risks & benefits discussed:    Anesthesia Type: Gen Supraglottic Airway, Gen Natural Airway, MAC  Intra-op Monitoring Plan: Standard ASA Monitors  Post Op Pain Control Plan: multimodal analgesia  Induction:  IV  Airway Plan: Direct, Video and Fiberoptic, Post-Induction  Informed Consent: Informed consent signed with the Patient and all parties understand the risks and agree with anesthesia plan.  All questions answered.   ASA Score: 3    Ready For Surgery From Anesthesia Perspective.     .

## 2023-08-04 NOTE — DISCHARGE SUMMARY
[facility]  Discharge Note  Short Stay    Procedure(s) (LRB):  EXCISION, CYST, LABIA AND OTHER INDICATED PROCEDURES (Bilateral)      OUTCOME: Patient tolerated treatment/procedure well without complication and is now ready for discharge.    DISPOSITION: Home or Self Care    FINAL DIAGNOSIS:  <principal problem not specified>    FOLLOWUP: In clinic    DISCHARGE INSTRUCTIONS:    Discharge Procedure Orders   Diet general     Sponge bath only until clinic visit     Keep surgical extremity elevated     Ice to affected area     Lifting restrictions     Other restrictions (specify):   Order Comments: Discharge Instructions:  Follow-up in 2 weeks or as needed.  Call immediately for any abnormal pain bleeding fever chills nausea vomiting or other significant postoperative issues.    Pelvic rest until follow-up.  No tub baths until follow-up.  Diet as tolerated     Call MD for:  temperature >100.4     Call MD for:  persistent nausea and vomiting     Call MD for:  severe uncontrolled pain     Call MD for:  difficulty breathing, headache or visual disturbances     Call MD for:  redness, tenderness, or signs of infection (pain, swelling, redness, odor or green/yellow discharge around incision site)     Call MD for:  hives     Call MD for:  persistent dizziness or light-headedness     Call MD for:  extreme fatigue     Call MD for:   Order Comments: Discharge Instructions:  Follow-up in 2 weeks or as needed.  Call immediately for any abnormal pain bleeding fever chills nausea vomiting or other significant postoperative issues.    Pelvic rest until follow-up.  No tub baths until follow-up.  Diet as tolerated     No dressing needed     Activity as tolerated     Shower on day dressing removed (No bath)   Order Comments: No tub baths until patient seen for postoperative evaluation in the office        TIME SPENT ON DISCHARGE: 10 minutes    Discharge medications: Motrin and Percocet

## 2023-08-04 NOTE — PLAN OF CARE
Released from Pacu per Anesthesia when criteria met pain controlled skin w+d No nausea No emesis no dsg open to air peripad on  [id number] encouraged deep breaths   Instr on Is and encouraged use  at home   Pt has all belongings in post op  with spouse

## 2023-08-07 VITALS
HEART RATE: 58 BPM | BODY MASS INDEX: 25.54 KG/M2 | OXYGEN SATURATION: 100 % | HEIGHT: 66 IN | DIASTOLIC BLOOD PRESSURE: 60 MMHG | TEMPERATURE: 98 F | SYSTOLIC BLOOD PRESSURE: 132 MMHG | WEIGHT: 158.94 LBS | RESPIRATION RATE: 18 BRPM

## 2023-08-07 NOTE — PROGRESS NOTES
Very pleased with care given. All staff were supportive and professional.  States she understands all discharge instructions.

## 2023-08-08 DIAGNOSIS — Z86.010 HISTORY OF COLON POLYPS: Primary | ICD-10-CM

## 2023-08-08 NOTE — H&P
"[facility] Note     Subjective:      Chief Complaint: Follow-up ("Bumps"')        HPI:  2023     77 year old female presents as an established patient with complaints of a bump in the vulva. She reports it occurred about one to two weeks ago. She was placed on antibiotics and reports that it somewhat helped decrease the size of the bump. She reports that these bumps have come and go for the past few months.  It is sometimes tender. Denies any drainage or bleeding.      She has had a TVH/BSO.         GYN & OB History  No LMP recorded. Patient is postmenopausal.      Date of Last Pap: No result found                      OB History    Para Term  AB Living   2 2 2 0 0 2   SAB IAB Ectopic Multiple Live Births      0 0 0 0 2          # Outcome Date GA Lbr William/2nd Weight Sex Delivery Anes PTL Lv   2 Term                     1 Term                         Obstetric Comments   Vag Del x 2              Past Medical History:   Diagnosis Date    Adenomatous polyp of colon 3/26/2012    Allergy      Anxiety      Cancer      Cataract      Colon polyp      Degenerative arthritis of knee 2012    Diverticulosis      Encounter for blood transfusion      GERD (gastroesophageal reflux disease)      History of thyroid cancer     Hyperlipidemia      Hypertension      Lateral meniscal tear 2013    Multinodular goiter 2012    Myopathy, unspecified 2010    Tuberculosis              Past Surgical History:   Procedure Laterality Date    BREAST BIOPSY Left      benign    CHOLECYSTECTOMY        COLONOSCOPY   2015     Dr. Britton, multiple polyps, recheck five years-three years if more than 2 polyps are adenomatous    COLONOSCOPY N/A 2015    COLONOSCOPY N/A 2019     Procedure: COLONOSCOPY;  Surgeon: Jose Britton MD;  Location: [facility];  Service: Endoscopy;  Laterality: N/A;    COLONOSCOPY N/A 2020     Dr. Britton; internal hemorrhoids; " diverticulosis; polyps removed; repeat in 3 years    CYSTOSCOPY N/A 08/26/2020     Procedure: CYSTOSCOPY;  Surgeon: Charlotte Walters Jr., MD;  Location: [facility];  Service: Urology;  Laterality: N/A;    DILATION AND CURETTAGE OF UTERUS        DISSECTION OF NECK Left 09/13/2021     Procedure: DISSECTION, NECK;  Surgeon: Ryan Torres MD;  Location: [facility] OR 2ND FLR;  Service: ENT;  Laterality: Left;    ESOPHAGEAL MANOMETRY WITH MEASUREMENT OF IMPEDANCE N/A 08/22/2022     Procedure: MANOMETRY, ESOPHAGUS, WITH IMPEDANCE MEASUREMENT;  Surgeon: Pantera Mcguire MD;  Location: [facility] (4TH FLR);  Service: Endoscopy;  Laterality: N/A;  8/4 fully vaccinated; instructions mailed-st    ESOPHAGOGASTRODUODENOSCOPY N/A 05/20/2020     Dr. Britton; small hiatal hernia; gastritis; 8 gastric polyps removed    ESOPHAGOGASTRODUODENOSCOPY N/A 04/01/2022     Procedure: EGD (ESOPHAGOGASTRODUODENOSCOPY);  Surgeon: Jose Britton MD;  Location: [facility];  Service: Endoscopy;  Laterality: N/A;    FOOT SURGERY        HYSTERECTOMY         TVH/BSO > 20 years    INTRALUMINAL GASTROINTESTINAL TRACT IMAGING VIA CAPSULE N/A 06/04/2020     Procedure: IMAGING PROCEDURE, GI TRACT, INTRALUMINAL, VIA CAPSULE;  Surgeon: Jose Britton MD;  Location: [facility];  Service: Endoscopy;  Laterality: N/A;    KNEE SURGERY   06/06/2013     right knee tear Dr Iverson     LAPAROSCOPIC CHOLECYSTECTOMY N/A 09/17/2020     Procedure: CHOLECYSTECTOMY, LAPAROSCOPIC;  Surgeon: Forrest Veronica MD;  Location: [facility];  Service: General;  Laterality: N/A;    LUNG LOBECTOMY   1966     right middle lobectomy, due to Tb    OOPHORECTOMY        SHOULDER SURGERY         francis     THYROIDECTOMY Bilateral 05/19/2021     Procedure: THYROIDECTOMY;  Surgeon: Jamia Amezquita MD;  Location: [facility] 2ND FLR;  Service: General;  Laterality: Bilateral;    THYROIDECTOMY Bilateral 09/13/2021     Procedure: THYROIDECTOMY WITH INTRA-OP PTH;  Surgeon: Ryan Torres MD;  Location: [facility]  OR 2ND FLR;  Service: ENT;  Laterality: Bilateral;  NIM tube  Intraop PTH      Review of patient's allergies indicates:  No Known Allergies     Social History            Socioeconomic History    Marital status:    Tobacco Use    Smoking status: Never    Smokeless tobacco: Never   Substance and Sexual Activity    Alcohol use: Not Currently       Comment: stopped 2019    Drug use: No    Sexual activity: Not Currently      Social Determinants of Health          Financial Resource Strain: Low Risk     Difficulty of Paying Living Expenses: Not hard at all   Food Insecurity: No Food Insecurity    Worried About Running Out of Food in the Last Year: Never true    Ran Out of Food in the Last Year: Never true   Transportation Needs: No Transportation Needs    Lack of Transportation (Medical): No    Lack of Transportation (Non-Medical): No   Physical Activity: Sufficiently Active    Days of Exercise per Week: 3 days    Minutes of Exercise per Session: 60 min   Stress: No Stress Concern Present    Feeling of Stress : Not at all   Social Connections: Moderately Integrated    Frequency of Communication with Friends and Family: Three times a week    Frequency of Social Gatherings with Friends and Family: Three times a week    Attends Anabaptism Services: More than 4 times per year    Active Member of Clubs or Organizations: No    Attends Club or Organization Meetings: Never    Marital Status:    Housing Stability: Low Risk     Unable to Pay for Housing in the Last Year: No    Number of Places Lived in the Last Year: 1    Unstable Housing in the Last Year: No               Family History   Problem Relation Age of Onset    Colon cancer Other      Cancer Mother      Hypertension Mother      Hyperlipidemia Mother      Cancer Brother      Hypertension Father      Emphysema Father      Diabetes Son      Hypertension Son      Alcohol abuse Son      Diabetes Maternal Aunt      Alzheimer's disease Maternal Uncle      Cancer  Maternal Grandmother      No Known Problems Daughter      Dementia Sister      Alzheimer's disease Sister      Breast cancer Sister 60    Obesity Paternal Uncle      Prostate cancer Other      Cancer Brother      Melanoma Neg Hx      Psoriasis Neg Hx      Lupus Neg Hx      Eczema Neg Hx      Amblyopia Neg Hx      Blindness Neg Hx      Cataracts Neg Hx      Glaucoma Neg Hx      Macular degeneration Neg Hx      Retinal detachment Neg Hx      Strabismus Neg Hx      Stroke Neg Hx      Thyroid cancer Neg Hx      Colon polyps Neg Hx      Ulcerative colitis Neg Hx      Stomach cancer Neg Hx      Rectal cancer Neg Hx           Medications          Current Outpatient Medications on File Prior to Visit   Medication Sig Dispense Refill Last Dose    amLODIPine (NORVASC) 2.5 MG tablet Take 1 tablet (2.5 mg total) by mouth once daily. 90 tablet 2 Taking    artificial tears (ISOPTO TEARS) 0.5 % ophthalmic solution 1 drop as needed.     Taking    blood sugar diagnostic (BLOOD GLUCOSE TEST) Strp True Metrix glucose strips check once daily 100 each 3 Taking    blood-glucose meter kit True Metrix glucometer check glucose once daily 1 each 0 Taking    coQ10, ubiquinol, 100 mg Cap Take 100 mg by mouth once daily.     Taking    doxepin (SINEQUAN) 25 MG capsule Take 25 mg by mouth every evening.     Taking    ergocalciferol (ERGOCALCIFEROL) 50,000 unit Cap Take 1 capsule (50,000 Units total) by mouth every 30 days. 3 capsule 3 Taking    levothyroxine (SYNTHROID) 75 MCG tablet Take 1 tablet (75 mcg total) by mouth before breakfast. Except half a tablet on Sundays 90 tablet 3 Taking    losartan (COZAAR) 100 MG tablet Take 1 tablet (100 mg total) by mouth once daily. 90 tablet 3 Taking    rosuvastatin (CRESTOR) 10 MG tablet Take 1 tablet (10 mg total) by mouth once daily. 90 tablet 4 Taking    calcium carbonate (TUMS) 200 mg calcium (500 mg) chewable tablet Chew and swallow 2 tablets (1,000 mg total) by mouth 3 (three) times daily. for 14  "days (Patient not taking: Reported on 7/6/2023) 100 tablet 0 Not Taking    conjugated estrogens (PREMARIN) vaginal cream Place 0.5 g vaginally once daily AND 0.5 g twice a week. (Patient not taking: Reported on 7/6/2023) 30 g 7 Not Taking    gabapentin (NEURONTIN) 300 MG capsule Take 300 mg by mouth 3 (three) times daily.     Not Taking         Review of Systems   Constitutional: Negative for appetite change, fever and unexpected weight change.   Respiratory: Negative for cough and shortness of breath.    Cardiovascular: Negative for chest pain and palpitations.   Gastrointestinal: Negative for abdominal distention, constipation, nausea and vomiting.   Genitourinary: Negative for dyspareunia, dysuria, hematuria and pelvic pain.        GYN ROS per HPI.   Musculoskeletal: Negative for gait problem and myalgias.   Skin: Negative for rash.   Neurological: Negative for dizziness, light-headedness and headaches.   Psychiatric/Behavioral: The patient is not nervous/anxious.       Objective:      BP (!) 123/58 (BP Location: Right arm, Patient Position: Sitting, BP Method: Medium (Automatic))   Pulse 70   Ht 5' 6" (1.676 m)   Wt 72.3 kg (159 lb 6.3 oz)   BMI 25.73 kg/m²      Physical Exam  Genitourinary:     Labia:         Right: No rash or tenderness.         Left: No rash or tenderness.               Assessment:      1. Sebaceous cyst       Plan:      1. Sebaceous cyst        Follow up for cysts removal, As needed or 2 weeks following surgery.  Follow-up sooner if needed.      Discuss physical exam findings with the patient. I suspect these are sebaceous cyst which are generally benign. They're typically only removed if they are bothersome to the patient.     Discussed case with Dr. Beach. He examined the patient. She is interested in having them surgically removed.   See his addendum note below.         Alka Felipe PA-C  7/6/2023     Addendum:     The patient was seen and evaluated as above.  Findings of 3 probable " sebaceous cyst were noted  Options were reviewed and the patient would like the areas excised.     The above was reviewed and discussed with the patient.     Conservative, medical, and surgical interventions were discussed, and the patient would like to proceed with surgical intervention.  She will be scheduled for an EXCISION OF VULVAR CYST AND OTHER INDICATED PROCEDURES     The pros, cons, risks, benefits, alternatives, and indications of the surgical procedure were discussed in detail with the patient.  We discussed the possibility of allergic reactions, bleeding, infection, scarring, and damage to other abdominal pelvic organs.  Issues unique to this procedure were discussed.     The patient's questions regarding above were answered and she agrees with this plan.  The patient was provided with the informed consent form and given times reviewed the form.  Questions were answered and consent was obtained

## 2023-08-14 ENCOUNTER — TELEPHONE (OUTPATIENT)
Dept: OBSTETRICS AND GYNECOLOGY | Facility: CLINIC | Age: 78
End: 2023-08-14
Payer: MEDICARE

## 2023-08-14 NOTE — TELEPHONE ENCOUNTER
----- Message from Janny oHff sent at 8/14/2023  8:47 AM CDT -----  Type: Needs Medical Advice  Who Called:  pt  Symptoms (please be specific):  pt said she need to speak to the nurse--said she was told her appt will be switched from 8/18 to 8/22 and she need to make sure she correct--I see both appts and she said she was not sure b/c she was told differently and she just want to make sure weather she need to come to both or just one--please call and advise  Best Call Back Number: [phone] or [phone] (home)     Additional Information: thank you

## 2023-08-22 ENCOUNTER — OFFICE VISIT (OUTPATIENT)
Dept: OBSTETRICS AND GYNECOLOGY | Facility: CLINIC | Age: 78
End: 2023-08-22
Payer: MEDICARE

## 2023-08-22 ENCOUNTER — OFFICE VISIT (OUTPATIENT)
Dept: PAIN MEDICINE | Facility: CLINIC | Age: 78
End: 2023-08-22
Payer: MEDICARE

## 2023-08-22 VITALS
WEIGHT: 160.69 LBS | HEIGHT: 66 IN | BODY MASS INDEX: 25.83 KG/M2 | SYSTOLIC BLOOD PRESSURE: 132 MMHG | RESPIRATION RATE: 16 BRPM | DIASTOLIC BLOOD PRESSURE: 58 MMHG

## 2023-08-22 VITALS
HEIGHT: 66 IN | BODY MASS INDEX: 25.39 KG/M2 | SYSTOLIC BLOOD PRESSURE: 132 MMHG | WEIGHT: 158 LBS | HEART RATE: 72 BPM | DIASTOLIC BLOOD PRESSURE: 73 MMHG

## 2023-08-22 DIAGNOSIS — M54.16 LUMBAR RADICULOPATHY: Primary | ICD-10-CM

## 2023-08-22 DIAGNOSIS — L72.3 SEBACEOUS CYST: ICD-10-CM

## 2023-08-22 DIAGNOSIS — M79.18 CERVICAL MYOFASCIAL PAIN SYNDROME: ICD-10-CM

## 2023-08-22 DIAGNOSIS — M54.12 CERVICAL RADICULOPATHY: ICD-10-CM

## 2023-08-22 DIAGNOSIS — Z98.890 POSTOPERATIVE STATE: Primary | ICD-10-CM

## 2023-08-22 DIAGNOSIS — M51.36 DDD (DEGENERATIVE DISC DISEASE), LUMBAR: ICD-10-CM

## 2023-08-22 PROCEDURE — 99999 PR PBB SHADOW E&M-EST. PATIENT-LVL III: ICD-10-PCS | Mod: PBBFAC,,,

## 2023-08-22 PROCEDURE — 1125F PR PAIN SEVERITY QUANTIFIED, PAIN PRESENT: ICD-10-PCS | Mod: CPTII,S$GLB,, | Performed by: OBSTETRICS & GYNECOLOGY

## 2023-08-22 PROCEDURE — 3288F PR FALLS RISK ASSESSMENT DOCUMENTED: ICD-10-PCS | Mod: CPTII,S$GLB,, | Performed by: OBSTETRICS & GYNECOLOGY

## 2023-08-22 PROCEDURE — 99024 PR POST-OP FOLLOW-UP VISIT: ICD-10-PCS | Mod: S$GLB,,, | Performed by: OBSTETRICS & GYNECOLOGY

## 2023-08-22 PROCEDURE — 1125F AMNT PAIN NOTED PAIN PRSNT: CPT | Mod: CPTII,S$GLB,, | Performed by: OBSTETRICS & GYNECOLOGY

## 2023-08-22 PROCEDURE — 1159F PR MEDICATION LIST DOCUMENTED IN MEDICAL RECORD: ICD-10-PCS | Mod: CPTII,S$GLB,, | Performed by: OBSTETRICS & GYNECOLOGY

## 2023-08-22 PROCEDURE — 3075F PR MOST RECENT SYSTOLIC BLOOD PRESS GE 130-139MM HG: ICD-10-PCS | Mod: CPTII,S$GLB,,

## 2023-08-22 PROCEDURE — 99024 POSTOP FOLLOW-UP VISIT: CPT | Mod: S$GLB,,, | Performed by: OBSTETRICS & GYNECOLOGY

## 2023-08-22 PROCEDURE — 1159F MED LIST DOCD IN RCRD: CPT | Mod: CPTII,S$GLB,, | Performed by: OBSTETRICS & GYNECOLOGY

## 2023-08-22 PROCEDURE — 1101F PR PT FALLS ASSESS DOC 0-1 FALLS W/OUT INJ PAST YR: ICD-10-PCS | Mod: CPTII,S$GLB,, | Performed by: OBSTETRICS & GYNECOLOGY

## 2023-08-22 PROCEDURE — 3288F PR FALLS RISK ASSESSMENT DOCUMENTED: ICD-10-PCS | Mod: CPTII,S$GLB,,

## 2023-08-22 PROCEDURE — 1160F RVW MEDS BY RX/DR IN RCRD: CPT | Mod: CPTII,S$GLB,,

## 2023-08-22 PROCEDURE — 1160F PR REVIEW ALL MEDS BY PRESCRIBER/CLIN PHARMACIST DOCUMENTED: ICD-10-PCS | Mod: CPTII,S$GLB,,

## 2023-08-22 PROCEDURE — 99999 PR PBB SHADOW E&M-EST. PATIENT-LVL III: ICD-10-PCS | Mod: PBBFAC,,, | Performed by: OBSTETRICS & GYNECOLOGY

## 2023-08-22 PROCEDURE — 3078F PR MOST RECENT DIASTOLIC BLOOD PRESSURE < 80 MM HG: ICD-10-PCS | Mod: CPTII,S$GLB,,

## 2023-08-22 PROCEDURE — 1101F PT FALLS ASSESS-DOCD LE1/YR: CPT | Mod: CPTII,S$GLB,,

## 2023-08-22 PROCEDURE — 1159F MED LIST DOCD IN RCRD: CPT | Mod: CPTII,S$GLB,,

## 2023-08-22 PROCEDURE — 99204 PR OFFICE/OUTPT VISIT, NEW, LEVL IV, 45-59 MIN: ICD-10-PCS | Mod: S$GLB,,,

## 2023-08-22 PROCEDURE — 3075F SYST BP GE 130 - 139MM HG: CPT | Mod: CPTII,S$GLB,,

## 2023-08-22 PROCEDURE — 1101F PR PT FALLS ASSESS DOC 0-1 FALLS W/OUT INJ PAST YR: ICD-10-PCS | Mod: CPTII,S$GLB,,

## 2023-08-22 PROCEDURE — 3078F DIAST BP <80 MM HG: CPT | Mod: CPTII,S$GLB,, | Performed by: OBSTETRICS & GYNECOLOGY

## 2023-08-22 PROCEDURE — 1125F PR PAIN SEVERITY QUANTIFIED, PAIN PRESENT: ICD-10-PCS | Mod: CPTII,S$GLB,,

## 2023-08-22 PROCEDURE — 3078F DIAST BP <80 MM HG: CPT | Mod: CPTII,S$GLB,,

## 2023-08-22 PROCEDURE — 99999 PR PBB SHADOW E&M-EST. PATIENT-LVL III: CPT | Mod: PBBFAC,,,

## 2023-08-22 PROCEDURE — 3078F PR MOST RECENT DIASTOLIC BLOOD PRESSURE < 80 MM HG: ICD-10-PCS | Mod: CPTII,S$GLB,, | Performed by: OBSTETRICS & GYNECOLOGY

## 2023-08-22 PROCEDURE — 1125F AMNT PAIN NOTED PAIN PRSNT: CPT | Mod: CPTII,S$GLB,,

## 2023-08-22 PROCEDURE — 3288F FALL RISK ASSESSMENT DOCD: CPT | Mod: CPTII,S$GLB,,

## 2023-08-22 PROCEDURE — 99999 PR PBB SHADOW E&M-EST. PATIENT-LVL III: CPT | Mod: PBBFAC,,, | Performed by: OBSTETRICS & GYNECOLOGY

## 2023-08-22 PROCEDURE — 1159F PR MEDICATION LIST DOCUMENTED IN MEDICAL RECORD: ICD-10-PCS | Mod: CPTII,S$GLB,,

## 2023-08-22 PROCEDURE — 3075F SYST BP GE 130 - 139MM HG: CPT | Mod: CPTII,S$GLB,, | Performed by: OBSTETRICS & GYNECOLOGY

## 2023-08-22 PROCEDURE — 1101F PT FALLS ASSESS-DOCD LE1/YR: CPT | Mod: CPTII,S$GLB,, | Performed by: OBSTETRICS & GYNECOLOGY

## 2023-08-22 PROCEDURE — 3075F PR MOST RECENT SYSTOLIC BLOOD PRESS GE 130-139MM HG: ICD-10-PCS | Mod: CPTII,S$GLB,, | Performed by: OBSTETRICS & GYNECOLOGY

## 2023-08-22 PROCEDURE — 99204 OFFICE O/P NEW MOD 45 MIN: CPT | Mod: S$GLB,,,

## 2023-08-22 PROCEDURE — 3288F FALL RISK ASSESSMENT DOCD: CPT | Mod: CPTII,S$GLB,, | Performed by: OBSTETRICS & GYNECOLOGY

## 2023-08-22 NOTE — PROGRESS NOTES
"  SUBJECTIVE:    Patient ID: Erica Masterson is a 77 y.o. female.    Chief Complaint: Follow-up (Lumabr DWAYNE/ 80% relief/ having some tightness in certain areas, especially the shoulders)  INTERVAL HPI:   Erica Masterson is a 77 y.o. female who presents today as a new patient, established with Dr. Walton, for f/u s/p Interlaminar DWAYNE at L5-S1 with excellent relief.  The patient continues to report of numbness and tingling into her feet bilaterally. The patient reports that it only occurs at night. The patient continues to report of lower back pain > neck pain.  The patient reports her pain is currently tolerable. In the interim, the patient had GYN surgery and is awaiting f/u for her surgery so she can return to exercise program. The patient reports she exercises 3 x a week and has recently restarted yoga. In regards to her neck pain, the patient reports of "tightness" in the base of her neck and into her left shoulder. The patient reports of neck pain radiating down the RUE without numbness and tingling. The patient has had botox injections in the past with Dr. Montgomery for cervical dystonia without meaningful relief.  The patient would like to address the numbness in her feet first.  The patient reports that her pain is a 3/10. Patient denies of any urinary/fecal incontinence, saddle anesthesia, or weakness.      HPI: ( PER DR. WALTON)  She presents today for follow-up having completed her course of physical therapy for neck and low back pain.  She does find physical therapy beneficial however she is still not satisfied with her quality of life.  Her primary complaint is of low back pain at the lumbosacral junction radiates into the posterior portion of the right leg and into the calf.  Secondary complaint of posterior neck discomfort.  She is also complaining of some mid to lower thoracic pain.  All of these are familiar symptoms.  Her pain level is 5/10    Pain Scales  Best:/10  Worst:  Usually:  Today: " 3/10    Follow-up  This is a chronic problem. The problem has been gradually improving. Associated symptoms include myalgias, neck pain and numbness. Pertinent negatives include no abdominal pain, arthralgias, chest pain, chills, diaphoresis, fatigue, fever, headaches, joint swelling, nausea, vomiting or weakness.     Interventional Pain Procedures:   7/20/2023: Interlaminar epidural steroid injection at L5-S1-80% relief.     Past Medical History:   Diagnosis Date    Adenomatous polyp of colon 3/26/2012    Allergy     Anxiety     Cancer     Cataract     Colon polyp     Degenerative arthritis of knee 04/03/2012    Diverticulosis     Encounter for blood transfusion     GERD (gastroesophageal reflux disease)     History of thyroid cancer 2021    Hyperlipidemia     Hypertension     Lateral meniscal tear 5/20/2013    Multinodular goiter 03/26/2012    Myopathy, unspecified 01/18/2010    Tuberculosis      Social History     Socioeconomic History    Marital status:    Tobacco Use    Smoking status: Never    Smokeless tobacco: Never   Substance and Sexual Activity    Alcohol use: Not Currently     Comment: stopped 2019    Drug use: No    Sexual activity: Not Currently     Social Determinants of Health     Financial Resource Strain: Low Risk  (1/19/2023)    Overall Financial Resource Strain (CARDIA)     Difficulty of Paying Living Expenses: Not hard at all   Food Insecurity: No Food Insecurity (1/19/2023)    Hunger Vital Sign     Worried About Running Out of Food in the Last Year: Never true     Ran Out of Food in the Last Year: Never true   Transportation Needs: No Transportation Needs (1/19/2023)    PRAPARE - Transportation     Lack of Transportation (Medical): No     Lack of Transportation (Non-Medical): No   Physical Activity: Sufficiently Active (1/19/2023)    Exercise Vital Sign     Days of Exercise per Week: 3 days     Minutes of Exercise per Session: 60 min   Stress: No Stress Concern Present (1/19/2023)     [facility] of Occupational Health - Occupational Stress Questionnaire     Feeling of Stress : Not at all   Social Connections: Moderately Integrated (1/19/2023)    Social Connection and Isolation Panel [NHANES]     Frequency of Communication with Friends and Family: Three times a week     Frequency of Social Gatherings with Friends and Family: Three times a week     Attends Yazidism Services: More than 4 times per year     Active Member of Clubs or Organizations: No     Attends Club or Organization Meetings: Never     Marital Status:    Housing Stability: Low Risk  (1/19/2023)    Housing Stability Vital Sign     Unable to Pay for Housing in the Last Year: No     Number of Places Lived in the Last Year: 1     Unstable Housing in the Last Year: No     Past Surgical History:   Procedure Laterality Date    BREAST BIOPSY Left 2015    benign    CHOLECYSTECTOMY      COLONOSCOPY  12/02/2015    Dr. Britton, multiple polyps, recheck five years-three years if more than 2 polyps are adenomatous    COLONOSCOPY N/A 12/02/2015    COLONOSCOPY N/A 03/20/2019    Procedure: COLONOSCOPY;  Surgeon: Jose Britton MD;  Location: [facility];  Service: Endoscopy;  Laterality: N/A;    COLONOSCOPY N/A 05/20/2020    Dr. Britton; internal hemorrhoids; diverticulosis; polyps removed; repeat in 3 years    CYSTOSCOPY N/A 08/26/2020    Procedure: CYSTOSCOPY;  Surgeon: Charlotte Walters Jr., MD;  Location: [facility] OR;  Service: Urology;  Laterality: N/A;    DILATION AND CURETTAGE OF UTERUS      DISSECTION OF NECK Left 09/13/2021    Procedure: DISSECTION, NECK;  Surgeon: Ryan Torres MD;  Location: [address];  Service: ENT;  Laterality: Left;    EPIDURAL STEROID INJECTION INTO LUMBAR SPINE N/A 7/20/2023    Procedure: Injection-steroid-epidural-lumbar;  Surgeon: Julián Chiang MD;  Location: [facility] OR;  Service: Pain Management;  Laterality: N/A;  L5-s1    ESOPHAGEAL MANOMETRY WITH MEASUREMENT OF IMPEDANCE N/A 08/22/2022     Procedure: MANOMETRY, ESOPHAGUS, WITH IMPEDANCE MEASUREMENT;  Surgeon: Pantera Mcguire MD;  Location: [facility] ENDO (4TH FLR);  Service: Endoscopy;  Laterality: N/A;  8/4 fully vaccinated; instructions mailed-st    ESOPHAGOGASTRODUODENOSCOPY N/A 05/20/2020    Dr. Britton; small hiatal hernia; gastritis; 8 gastric polyps removed    ESOPHAGOGASTRODUODENOSCOPY N/A 04/01/2022    Procedure: EGD (ESOPHAGOGASTRODUODENOSCOPY);  Surgeon: Jose Britton MD;  Location: [facility];  Service: Endoscopy;  Laterality: N/A;    FOOT SURGERY      HYSTERECTOMY      TVH/BSO > 20 years    INCISION OF VULVA Bilateral 8/4/2023    Procedure: EXCISION, CYST, LABIA AND OTHER INDICATED PROCEDURES;  Surgeon: Mat Beach MD;  Location: [facility];  Service: OB/GYN;  Laterality: Bilateral;  EXCYSION OF VULVAR CYST    INTRALUMINAL GASTROINTESTINAL TRACT IMAGING VIA CAPSULE N/A 06/04/2020    Procedure: IMAGING PROCEDURE, GI TRACT, INTRALUMINAL, VIA CAPSULE;  Surgeon: Jose Britton MD;  Location: [facility];  Service: Endoscopy;  Laterality: N/A;    KNEE SURGERY  06/06/2013    right knee tear Dr Iverson     LAPAROSCOPIC CHOLECYSTECTOMY N/A 09/17/2020    Procedure: CHOLECYSTECTOMY, LAPAROSCOPIC;  Surgeon: Forrest Veronica MD;  Location: [facility];  Service: General;  Laterality: N/A;    LUNG LOBECTOMY  1966    right middle lobectomy, due to Tb    OOPHORECTOMY      SHOULDER SURGERY      francis     THYROIDECTOMY Bilateral 05/19/2021    Procedure: THYROIDECTOMY;  Surgeon: Jamia Amezquita MD;  Location: [facility] 2ND FLR;  Service: General;  Laterality: Bilateral;    THYROIDECTOMY Bilateral 09/13/2021    Procedure: THYROIDECTOMY WITH INTRA-OP PTH;  Surgeon: Ryan Torres MD;  Location: [facility] OR 2ND FLR;  Service: ENT;  Laterality: Bilateral;  NIM tube  Intraop PTH     Family History   Problem Relation Age of Onset    Colon cancer Other     Cancer Mother     Hypertension Mother     Hyperlipidemia Mother     Cancer Brother     Hypertension Father      "Emphysema Father     Diabetes Son     Hypertension Son     Alcohol abuse Son     Diabetes Maternal Aunt     Alzheimer's disease Maternal Uncle     Cancer Maternal Grandmother     No Known Problems Daughter     Dementia Sister     Alzheimer's disease Sister     Breast cancer Sister 60    Obesity Paternal Uncle     Prostate cancer Other     Cancer Brother     Melanoma Neg Hx     Psoriasis Neg Hx     Lupus Neg Hx     Eczema Neg Hx     Amblyopia Neg Hx     Blindness Neg Hx     Cataracts Neg Hx     Glaucoma Neg Hx     Macular degeneration Neg Hx     Retinal detachment Neg Hx     Strabismus Neg Hx     Stroke Neg Hx     Thyroid cancer Neg Hx     Colon polyps Neg Hx     Ulcerative colitis Neg Hx     Stomach cancer Neg Hx     Rectal cancer Neg Hx      Vitals:    08/22/23 1134   BP: 132/73   Pulse: 72   Weight: 71.7 kg (158 lb)   Height: 5' 6" (1.676 m)       Review of Systems   Constitutional:  Negative for chills, diaphoresis, fatigue, fever and unexpected weight change.   HENT:  Negative for trouble swallowing.    Eyes:  Negative for visual disturbance.   Respiratory:  Negative for shortness of breath.    Cardiovascular:  Negative for chest pain.   Gastrointestinal:  Negative for abdominal pain, constipation, nausea and vomiting.   Genitourinary:  Negative for difficulty urinating.   Musculoskeletal:  Positive for back pain, myalgias and neck pain. Negative for arthralgias, gait problem, joint swelling and neck stiffness.   Neurological:  Positive for numbness. Negative for dizziness, speech difficulty, weakness, light-headedness and headaches.   All other systems reviewed and are negative.         Objective:      Physical Exam  Vitals and nursing note reviewed.   Musculoskeletal:         General: Tenderness present.   Skin:     General: Skin is warm and dry.   Neurological:      Mental Status: She is alert and oriented to person, place, and time.   Psychiatric:         Mood and Affect: Mood normal.         LUMBAR " SPINE  Lumbar spine: ROM is limited with flexion extension and oblique extension with increased pain with extension.     ((-)) Supine straight leg raise left side   ((-)) Facet loading   Internal and external rotation of the hip causes no increased pain .  Myofascial exam: Tenderness to palpation across lumbar parapsinous processes      (-)) TTP at the SI joint  ((+)) JOSE's test  (-)) One leg stand    ((-)) Distraction test  ((-)) Thigh thrust     Assessment:     Erica Mastreson is  77 y.o. female who presents today as a new patient f/u from Interlaminar DWAYNE at L5-S1 with excellent relief.  The patient continues to have numbness and tingling in her bilateral feet.  Lower back pain tolerable at this time.  Neck pain is present with myofascial and radicular symptoms.   1. Lumbar radiculopathy    2. DDD (degenerative disc disease), lumbar    3. Cervical radiculopathy    4. Cervical myofascial pain syndrome             Plan:     - Reviewed pertinent imaging and records with patient   - continue to monitor progress of Interlaminar DWAYNE at L5-S1   -  Discussed numbness of feet, advised the patient that this could be result of chronic nerve damage in the lumbar spine and that it may not completely resolve.   - Discussed neck pain, offered TPIs of neck secondary to myofascial pain with local anesthetic. Deferred at this time.   - Consider interlaminar injection C7-T1.    - Continue exercise program once released by GYN surgeon.   - F/U 8 weeks for possible TPIs  of neck or to schedule repeat procedure.   Sania Stephen, NP       Lumbar radiculopathy    DDD (degenerative disc disease), lumbar    Cervical radiculopathy    Cervical myofascial pain syndrome

## 2023-08-23 DIAGNOSIS — M25.512 BILATERAL SHOULDER PAIN, UNSPECIFIED CHRONICITY: Primary | ICD-10-CM

## 2023-08-23 DIAGNOSIS — M25.511 BILATERAL SHOULDER PAIN, UNSPECIFIED CHRONICITY: Primary | ICD-10-CM

## 2023-09-25 ENCOUNTER — TELEPHONE (OUTPATIENT)
Dept: FAMILY MEDICINE | Facility: CLINIC | Age: 78
End: 2023-09-25
Payer: MEDICARE

## 2023-09-25 NOTE — TELEPHONE ENCOUNTER
Spoke to patient.  Yes had colonoscopy on 3/20/2019 with polyps with Dr Bentley   Instructed this is a follow up procedure. Dr Bentley will give her information if another one will be needed.      ----- Message from Stephani Edmonds sent at 9/25/2023  2:15 PM CDT -----  Type: Needs Medical Advice  Who Called:  pt   Symptoms (please be specific):  jayesh'd colonoscopy   Best Call Back Number: [phone] or [phone] (Mobile)    Additional Information: pt stated she has been jayesh'd for colonoscopy and wants to be advised on if provider thinks pt should do the procedure pt has hist of pile up and turning 78 in November. Please calll back to advise asap thanks! Pt also stated hist of thyroid cancer

## 2023-10-04 ENCOUNTER — OFFICE VISIT (OUTPATIENT)
Dept: OBSTETRICS AND GYNECOLOGY | Facility: CLINIC | Age: 78
End: 2023-10-04
Payer: MEDICARE

## 2023-10-04 VITALS
WEIGHT: 161.81 LBS | SYSTOLIC BLOOD PRESSURE: 152 MMHG | BODY MASS INDEX: 26.01 KG/M2 | RESPIRATION RATE: 12 BRPM | DIASTOLIC BLOOD PRESSURE: 60 MMHG | HEIGHT: 66 IN

## 2023-10-04 DIAGNOSIS — N76.4 VULVAR ABSCESS: Primary | ICD-10-CM

## 2023-10-04 PROCEDURE — 3078F DIAST BP <80 MM HG: CPT | Mod: CPTII,S$GLB,, | Performed by: OBSTETRICS & GYNECOLOGY

## 2023-10-04 PROCEDURE — 3077F SYST BP >= 140 MM HG: CPT | Mod: CPTII,S$GLB,, | Performed by: OBSTETRICS & GYNECOLOGY

## 2023-10-04 PROCEDURE — 3077F PR MOST RECENT SYSTOLIC BLOOD PRESSURE >= 140 MM HG: ICD-10-PCS | Mod: CPTII,S$GLB,, | Performed by: OBSTETRICS & GYNECOLOGY

## 2023-10-04 PROCEDURE — 1101F PT FALLS ASSESS-DOCD LE1/YR: CPT | Mod: CPTII,S$GLB,, | Performed by: OBSTETRICS & GYNECOLOGY

## 2023-10-04 PROCEDURE — 1125F PR PAIN SEVERITY QUANTIFIED, PAIN PRESENT: ICD-10-PCS | Mod: CPTII,S$GLB,, | Performed by: OBSTETRICS & GYNECOLOGY

## 2023-10-04 PROCEDURE — 99214 OFFICE O/P EST MOD 30 MIN: CPT | Mod: S$GLB,,, | Performed by: OBSTETRICS & GYNECOLOGY

## 2023-10-04 PROCEDURE — 87070 CULTURE OTHR SPECIMN AEROBIC: CPT | Performed by: OBSTETRICS & GYNECOLOGY

## 2023-10-04 PROCEDURE — 3288F PR FALLS RISK ASSESSMENT DOCUMENTED: ICD-10-PCS | Mod: CPTII,S$GLB,, | Performed by: OBSTETRICS & GYNECOLOGY

## 2023-10-04 PROCEDURE — 99999 PR PBB SHADOW E&M-EST. PATIENT-LVL III: ICD-10-PCS | Mod: PBBFAC,,, | Performed by: OBSTETRICS & GYNECOLOGY

## 2023-10-04 PROCEDURE — 99999 PR PBB SHADOW E&M-EST. PATIENT-LVL III: CPT | Mod: PBBFAC,,, | Performed by: OBSTETRICS & GYNECOLOGY

## 2023-10-04 PROCEDURE — 99214 PR OFFICE/OUTPT VISIT, EST, LEVL IV, 30-39 MIN: ICD-10-PCS | Mod: S$GLB,,, | Performed by: OBSTETRICS & GYNECOLOGY

## 2023-10-04 PROCEDURE — 3078F PR MOST RECENT DIASTOLIC BLOOD PRESSURE < 80 MM HG: ICD-10-PCS | Mod: CPTII,S$GLB,, | Performed by: OBSTETRICS & GYNECOLOGY

## 2023-10-04 PROCEDURE — 1101F PR PT FALLS ASSESS DOC 0-1 FALLS W/OUT INJ PAST YR: ICD-10-PCS | Mod: CPTII,S$GLB,, | Performed by: OBSTETRICS & GYNECOLOGY

## 2023-10-04 PROCEDURE — 1159F PR MEDICATION LIST DOCUMENTED IN MEDICAL RECORD: ICD-10-PCS | Mod: CPTII,S$GLB,, | Performed by: OBSTETRICS & GYNECOLOGY

## 2023-10-04 PROCEDURE — 1125F AMNT PAIN NOTED PAIN PRSNT: CPT | Mod: CPTII,S$GLB,, | Performed by: OBSTETRICS & GYNECOLOGY

## 2023-10-04 PROCEDURE — 3288F FALL RISK ASSESSMENT DOCD: CPT | Mod: CPTII,S$GLB,, | Performed by: OBSTETRICS & GYNECOLOGY

## 2023-10-04 PROCEDURE — 87075 CULTR BACTERIA EXCEPT BLOOD: CPT | Performed by: OBSTETRICS & GYNECOLOGY

## 2023-10-04 PROCEDURE — 1159F MED LIST DOCD IN RCRD: CPT | Mod: CPTII,S$GLB,, | Performed by: OBSTETRICS & GYNECOLOGY

## 2023-10-04 RX ORDER — SULFAMETHOXAZOLE AND TRIMETHOPRIM 400; 80 MG/1; MG/1
1 TABLET ORAL 2 TIMES DAILY
Qty: 14 TABLET | Refills: 0 | Status: SHIPPED | OUTPATIENT
Start: 2023-10-04 | End: 2023-10-11

## 2023-10-04 NOTE — PROGRESS NOTES
Subjective:   Chief Complaint:  Cyst       Patient ID: Erica Masterson is a  77 y.o. female.    Date: 10/07/2023     The patient is status post:  Excision of bilateral vulvar cysts on 2023.    Pathology: DIAGNOSIS:     VULVAR CYST, EXCISIONAL BIOPSY:   --BARTHOLIN CYST.   _______________________________________________________________________________________________________     AMENDED DIAGNOSIS:     VULVAR CYST, EXCISIONAL BIOPSY:   --HISTOLOGIC FINDINGS MOST CONSISTENT WITH STEATOCYSTOMA.     The patient had been doing well but beginning on  noticed a right vulvar lesion.  She is concerned that the previous cysts are returning.  No vaginal bleeding or pelvic pain.    GYN & OB History  No LMP recorded. Patient is postmenopausal.     Date of Last Pap: No result found    OB History    Para Term  AB Living   2 2 2 0 0 2   SAB IAB Ectopic Multiple Live Births   0 0 0 0 2      # Outcome Date GA Lbr William/2nd Weight Sex Delivery Anes PTL Lv   2 Term            1 Term               Obstetric Comments   Vag Del x 2         Past Medical History:   Diagnosis Date    Adenomatous polyp of colon 3/26/2012    Allergy     Anxiety     Cancer     Cataract     Colon polyp     Degenerative arthritis of knee 2012    Diverticulosis     Encounter for blood transfusion     GERD (gastroesophageal reflux disease)     History of thyroid cancer     Hyperlipidemia     Hypertension     Lateral meniscal tear 2013    Multinodular goiter 2012    Myopathy, unspecified 2010    Tuberculosis         Past Surgical History:   Procedure Laterality Date    BREAST BIOPSY Left     benign    CHOLECYSTECTOMY      COLONOSCOPY  2015    Dr. Britton, multiple polyps, recheck five years-three years if more than 2 polyps are adenomatous    COLONOSCOPY N/A 2015    COLONOSCOPY N/A 2019    Procedure: COLONOSCOPY;  Surgeon: Jose Britton MD;  Location: [facility];  Service:  Endoscopy;  Laterality: N/A;    COLONOSCOPY N/A 05/20/2020    Dr. Britton; internal hemorrhoids; diverticulosis; polyps removed; repeat in 3 years    CYSTOSCOPY N/A 08/26/2020    Procedure: CYSTOSCOPY;  Surgeon: Charlotte Walters Jr., MD;  Location: [facility] OR;  Service: Urology;  Laterality: N/A;    DILATION AND CURETTAGE OF UTERUS      DISSECTION OF NECK Left 09/13/2021    Procedure: DISSECTION, NECK;  Surgeon: Ryan Torres MD;  Location: [facility] 2ND FLR;  Service: ENT;  Laterality: Left;    EPIDURAL STEROID INJECTION INTO LUMBAR SPINE N/A 7/20/2023    Procedure: Injection-steroid-epidural-lumbar;  Surgeon: Julián Chiang MD;  Location: [facility] OR;  Service: Pain Management;  Laterality: N/A;  L5-s1    ESOPHAGEAL MANOMETRY WITH MEASUREMENT OF IMPEDANCE N/A 08/22/2022    Procedure: MANOMETRY, ESOPHAGUS, WITH IMPEDANCE MEASUREMENT;  Surgeon: Pantera Mcguire MD;  Location: [facility] (4TH FLR);  Service: Endoscopy;  Laterality: N/A;  8/4 fully vaccinated; instructions mailed-st    ESOPHAGOGASTRODUODENOSCOPY N/A 05/20/2020    Dr. Britton; small hiatal hernia; gastritis; 8 gastric polyps removed    ESOPHAGOGASTRODUODENOSCOPY N/A 04/01/2022    Procedure: EGD (ESOPHAGOGASTRODUODENOSCOPY);  Surgeon: Jose Britton MD;  Location: [facility];  Service: Endoscopy;  Laterality: N/A;    FOOT SURGERY      HYSTERECTOMY      TVH/BSO > 20 years    INCISION OF VULVA Bilateral 8/4/2023    Procedure: EXCISION, CYST, LABIA AND OTHER INDICATED PROCEDURES;  Surgeon: Mat Beach MD;  Location: [facility] OR;  Service: OB/GYN;  Laterality: Bilateral;  EXCYSION OF VULVAR CYST    INTRALUMINAL GASTROINTESTINAL TRACT IMAGING VIA CAPSULE N/A 06/04/2020    Procedure: IMAGING PROCEDURE, GI TRACT, INTRALUMINAL, VIA CAPSULE;  Surgeon: Jose Britton MD;  Location: [facility];  Service: Endoscopy;  Laterality: N/A;    KNEE SURGERY  06/06/2013    right knee tear Dr Iverson     LAPAROSCOPIC CHOLECYSTECTOMY N/A 09/17/2020    Procedure: CHOLECYSTECTOMY,  LAPAROSCOPIC;  Surgeon: Forrest Veronica MD;  Location: [facility];  Service: General;  Laterality: N/A;    LUNG LOBECTOMY  1966    right middle lobectomy, due to Tb    OOPHORECTOMY      SHOULDER SURGERY      francis     THYROIDECTOMY Bilateral 05/19/2021    Procedure: THYROIDECTOMY;  Surgeon: Jamia Amezquita MD;  Location: [address];  Service: General;  Laterality: Bilateral;    THYROIDECTOMY Bilateral 09/13/2021    Procedure: THYROIDECTOMY WITH INTRA-OP PTH;  Surgeon: Ryan Torres MD;  Location: [address];  Service: ENT;  Laterality: Bilateral;  NIM tube  Intraop PTH        Review of Systems  Review of Systems   Constitutional:  Negative for fever and unexpected weight change.   Respiratory: Negative.     Cardiovascular:  Negative for chest pain and palpitations.   Gastrointestinal:  Negative for nausea and vomiting.   Genitourinary:  Negative for dysuria, hematuria and urgency.        Gyn as per HPI   Neurological:  Negative for headaches.           Objective:      Vitals:    10/04/23 0920   BP: (!) 152/60   Resp: 12     Physical Exam:   Constitutional: She appears well-developed and well-nourished.    HENT:   Head: Normocephalic.     Neck: No thyroid mass present.    Cardiovascular:  Normal rate.             Pulmonary/Chest: Effort normal. No respiratory distress.        Abdominal: Soft. There is no abdominal tenderness.     Genitourinary:          Pelvic exam was performed with patient supine.   External genitalia lesions present.           Musculoskeletal: Normal range of motion.      Right lower leg: No edema.      Left lower leg: No edema.       Neurological: She is alert.   No gross lesions noted.    Skin: Skin is warm and dry.    Psychiatric: She has a normal mood and affect. Her speech is normal and behavior is normal. Mood normal.          Assessment:        1. Vulvar abscess         Plan:      Vulvar abscess  -     Culture, Anaerobic  -     Aerobic culture  -      sulfamethoxazole-trimethoprim 400-80mg (BACTRIM) 400-80 mg per tablet; Take 1 tablet by mouth 2 (two) times daily. for 7 days  Dispense: 14 tablet; Refill: 0      Follow up in about 2 weeks (around 10/18/2023) for Follow-up on today's evaluation.     The above was reviewed discussed with the patient.  We discussed the the fact that this is a new vulvar abscess rather than underlying cyst.  The abscess is open and draining and does not require further incision at this time.  Purulent discharge was expressed and culture was obtained.      At this time will proceed as follows:   Culture of the abscess has been obtained  The patient is being started on oral Bactrim one pill twice daily for seven days.  The pros, cons, risks, benefits, alternatives and indications of the medication(s) prescribed, as well as appropriate use and potential side effects were discussed.  We will base further evaluation treatment results of the culture and the patient's response to oral antibiotics.    The patient's questions regarding the above were answered and she is in agreement with the current plan.    Mat Beach MD  Department OBGYN [facility]

## 2023-10-07 PROBLEM — N90.7 LABIAL CYST: Status: RESOLVED | Noted: 2023-06-29 | Resolved: 2023-10-07

## 2023-10-07 PROBLEM — R07.9 CHEST PAIN: Status: RESOLVED | Noted: 2022-08-16 | Resolved: 2023-10-07

## 2023-10-07 PROBLEM — K59.00 CONSTIPATION: Status: RESOLVED | Noted: 2020-05-12 | Resolved: 2023-10-07

## 2023-10-07 LAB — BACTERIA SPEC AEROBE CULT: NORMAL

## 2023-10-09 LAB — BACTERIA SPEC ANAEROBE CULT: ABNORMAL

## 2023-10-09 RX ORDER — DOXEPIN HYDROCHLORIDE 25 MG/1
25 CAPSULE ORAL
Qty: 30 CAPSULE | Refills: 0 | OUTPATIENT
Start: 2023-10-09

## 2023-10-09 NOTE — TELEPHONE ENCOUNTER
No care due was identified.  Health [facility] Embedded Care Due Messages. Reference number: [id number].   10/09/2023 4:10:40 PM CDT

## 2023-10-10 NOTE — TELEPHONE ENCOUNTER
Refill Decision Note  Fernando DC. Inappropriate Request     Erica Masterson  is requesting a refill authorization.  Brief Assessment and Rationale for Refill:  Quick Discontinue     Medication Therapy Plan:  This was discontinued on 08/03/2022; therapy not effective.    Medication Reconciliation Completed: No   Comments:     No Care Gaps recommended.     Note composed:7:30 PM 10/09/2023

## 2023-10-12 RX ORDER — DOXEPIN HYDROCHLORIDE 25 MG/1
25 CAPSULE ORAL NIGHTLY
Qty: 90 CAPSULE | Refills: 3 | Status: SHIPPED | OUTPATIENT
Start: 2023-10-12

## 2023-10-12 NOTE — TELEPHONE ENCOUNTER
Patient put herself back on Doxepin 25mg at night and it is helping her sleep-asking for refill to be sent to [facility].

## 2023-10-12 NOTE — TELEPHONE ENCOUNTER
----- Message from Saqib Chavira sent at 10/12/2023  3:54 PM CDT -----  Regarding: refill  Type:  RX Refill Request    Who Called: pt     Refill or New Rx:refill    RX Name and Strength:doxepin (SINEQUAN) 25 MG capsule - -  -   Sig - Route: Take 25 mg by mouth every evening. - Oral         How is the patient currently taking it? (ex. 1XDay):see above    Is this a 30 day or 90 day RX:see above    Preferred Pharmacy with phone number:  [id number] - [place] [address] [place] [address]  Phone: [phone] Fax: [phone]        Local or Mail Order:Local    Ordering Provider:Lucia Monzon Call Back Number:[phone]      Additional Information:  Please call to discuss.

## 2023-10-12 NOTE — TELEPHONE ENCOUNTER
No care due was identified.  Health [facility] Embedded Care Due Messages. Reference number: [id number].   10/12/2023 4:20:40 PM CDT

## 2023-10-12 NOTE — TELEPHONE ENCOUNTER
----- Message from Saqib Chavira sent at 10/12/2023  3:55 PM CDT -----  Regarding: rt call  Type:  Patient Returning Call    Who Called:pt    Who Left Message for Patient:unknown    Does the patient know what this is regarding?:yes    Best Call Back Number:[phone]      Additional Information:

## 2023-10-18 ENCOUNTER — TELEPHONE (OUTPATIENT)
Dept: FAMILY MEDICINE | Facility: CLINIC | Age: 78
End: 2023-10-18
Payer: MEDICARE

## 2023-10-18 ENCOUNTER — OFFICE VISIT (OUTPATIENT)
Dept: OBSTETRICS AND GYNECOLOGY | Facility: CLINIC | Age: 78
End: 2023-10-18
Payer: MEDICARE

## 2023-10-18 VITALS
SYSTOLIC BLOOD PRESSURE: 140 MMHG | DIASTOLIC BLOOD PRESSURE: 60 MMHG | HEIGHT: 66 IN | RESPIRATION RATE: 16 BRPM | WEIGHT: 161.81 LBS | BODY MASS INDEX: 26.01 KG/M2

## 2023-10-18 DIAGNOSIS — N76.4 VULVAR ABSCESS: Primary | ICD-10-CM

## 2023-10-18 PROCEDURE — 3077F PR MOST RECENT SYSTOLIC BLOOD PRESSURE >= 140 MM HG: ICD-10-PCS | Mod: CPTII,S$GLB,, | Performed by: OBSTETRICS & GYNECOLOGY

## 2023-10-18 PROCEDURE — 1101F PR PT FALLS ASSESS DOC 0-1 FALLS W/OUT INJ PAST YR: ICD-10-PCS | Mod: CPTII,S$GLB,, | Performed by: OBSTETRICS & GYNECOLOGY

## 2023-10-18 PROCEDURE — 99213 PR OFFICE/OUTPT VISIT, EST, LEVL III, 20-29 MIN: ICD-10-PCS | Mod: S$GLB,,, | Performed by: OBSTETRICS & GYNECOLOGY

## 2023-10-18 PROCEDURE — 1126F AMNT PAIN NOTED NONE PRSNT: CPT | Mod: CPTII,S$GLB,, | Performed by: OBSTETRICS & GYNECOLOGY

## 2023-10-18 PROCEDURE — 3288F PR FALLS RISK ASSESSMENT DOCUMENTED: ICD-10-PCS | Mod: CPTII,S$GLB,, | Performed by: OBSTETRICS & GYNECOLOGY

## 2023-10-18 PROCEDURE — 3077F SYST BP >= 140 MM HG: CPT | Mod: CPTII,S$GLB,, | Performed by: OBSTETRICS & GYNECOLOGY

## 2023-10-18 PROCEDURE — 1159F PR MEDICATION LIST DOCUMENTED IN MEDICAL RECORD: ICD-10-PCS | Mod: CPTII,S$GLB,, | Performed by: OBSTETRICS & GYNECOLOGY

## 2023-10-18 PROCEDURE — 99999 PR PBB SHADOW E&M-EST. PATIENT-LVL III: ICD-10-PCS | Mod: PBBFAC,,, | Performed by: OBSTETRICS & GYNECOLOGY

## 2023-10-18 PROCEDURE — 99213 OFFICE O/P EST LOW 20 MIN: CPT | Mod: S$GLB,,, | Performed by: OBSTETRICS & GYNECOLOGY

## 2023-10-18 PROCEDURE — 1101F PT FALLS ASSESS-DOCD LE1/YR: CPT | Mod: CPTII,S$GLB,, | Performed by: OBSTETRICS & GYNECOLOGY

## 2023-10-18 PROCEDURE — 1126F PR PAIN SEVERITY QUANTIFIED, NO PAIN PRESENT: ICD-10-PCS | Mod: CPTII,S$GLB,, | Performed by: OBSTETRICS & GYNECOLOGY

## 2023-10-18 PROCEDURE — 3078F PR MOST RECENT DIASTOLIC BLOOD PRESSURE < 80 MM HG: ICD-10-PCS | Mod: CPTII,S$GLB,, | Performed by: OBSTETRICS & GYNECOLOGY

## 2023-10-18 PROCEDURE — 3288F FALL RISK ASSESSMENT DOCD: CPT | Mod: CPTII,S$GLB,, | Performed by: OBSTETRICS & GYNECOLOGY

## 2023-10-18 PROCEDURE — 99999 PR PBB SHADOW E&M-EST. PATIENT-LVL III: CPT | Mod: PBBFAC,,, | Performed by: OBSTETRICS & GYNECOLOGY

## 2023-10-18 PROCEDURE — 1159F MED LIST DOCD IN RCRD: CPT | Mod: CPTII,S$GLB,, | Performed by: OBSTETRICS & GYNECOLOGY

## 2023-10-18 PROCEDURE — 3078F DIAST BP <80 MM HG: CPT | Mod: CPTII,S$GLB,, | Performed by: OBSTETRICS & GYNECOLOGY

## 2023-10-18 NOTE — TELEPHONE ENCOUNTER
----- Message from Elizabeth Lara sent at 10/18/2023  7:30 AM CDT -----  Contact: Patient  Type: Needs Medical Advice    Who Called:  Patient  What is this regarding?:  She noticed a nodule under her L breast and is going to see Dr. Beach for it this morning. She needs her mammo scheduled for this year.  Best Call Back Number:  [phone]  Additional Information:  Please call the patient back at the phone number listed above to advise. Thank you!

## 2023-10-18 NOTE — PROGRESS NOTES
Subjective:   Chief Complaint:  Follow-up (Abscess follow up)       Patient ID: Erica Masterson is a  78 y.o. female.    Date: 10/23/2023     The patient is status post:  Excision of bilateral vulvar cysts on 2023.    Pathology: DIAGNOSIS:     VULVAR CYST, EXCISIONAL BIOPSY:   --BARTHOLIN CYST.   _______________________________________________________________________________________________________     AMENDED DIAGNOSIS:     VULVAR CYST, EXCISIONAL BIOPSY:   --HISTOLOGIC FINDINGS MOST CONSISTENT WITH STEATOCYSTOMA.     The patient had been doing well but beginning on  noticed a right vulvar lesion.  She is concerned that the previous cysts are returning.  No vaginal bleeding or pelvic pain.    Date: 10/23/2023    The patient presents today for follow-up.  She was last seen as above, at which time a draining vulvar abscess was noted.  The patient was started on oral antibiotics.    She reports an improvement in the area though she continues to have a little discomfort.  No bleeding or drainage from the abscess site.    GYN & OB History  No LMP recorded. Patient is postmenopausal.     OB History    Para Term  AB Living   2 2 2 0 0 2   SAB IAB Ectopic Multiple Live Births   0 0 0 0 2      # Outcome Date GA Lbr William/2nd Weight Sex Delivery Anes PTL Lv   2 Term            1 Term               Obstetric Comments   Vag Del x 2       Past Medical History:   Diagnosis Date    Adenomatous polyp of colon 3/26/2012    Allergy     Anxiety     Cancer     Cataract     Colon polyp     Degenerative arthritis of knee 2012    Diverticulosis     Encounter for blood transfusion     GERD (gastroesophageal reflux disease)     History of thyroid cancer     Hyperlipidemia     Hypertension     Lateral meniscal tear 2013    Multinodular goiter 2012    Myopathy, unspecified 2010    Tuberculosis         Past Surgical History:   Procedure Laterality Date    BREAST BIOPSY Left      benign    CHOLECYSTECTOMY      COLONOSCOPY  12/02/2015    Dr. Britton, multiple polyps, recheck five years-three years if more than 2 polyps are adenomatous    COLONOSCOPY N/A 12/02/2015    COLONOSCOPY N/A 03/20/2019    Procedure: COLONOSCOPY;  Surgeon: Jose Britton MD;  Location: [facility];  Service: Endoscopy;  Laterality: N/A;    COLONOSCOPY N/A 05/20/2020    Dr. Britton; internal hemorrhoids; diverticulosis; polyps removed; repeat in 3 years    CYSTOSCOPY N/A 08/26/2020    Procedure: CYSTOSCOPY;  Surgeon: Charlotte Walters Jr., MD;  Location: [facility] OR;  Service: Urology;  Laterality: N/A;    DILATION AND CURETTAGE OF UTERUS      DISSECTION OF NECK Left 09/13/2021    Procedure: DISSECTION, NECK;  Surgeon: Ryan Torres MD;  Location: [facility] 2ND FLR;  Service: ENT;  Laterality: Left;    EPIDURAL STEROID INJECTION INTO LUMBAR SPINE N/A 7/20/2023    Procedure: Injection-steroid-epidural-lumbar;  Surgeon: Julián Chiang MD;  Location: [facility] OR;  Service: Pain Management;  Laterality: N/A;  L5-s1    ESOPHAGEAL MANOMETRY WITH MEASUREMENT OF IMPEDANCE N/A 08/22/2022    Procedure: MANOMETRY, ESOPHAGUS, WITH IMPEDANCE MEASUREMENT;  Surgeon: Pantera Mcguire MD;  Location: [facility] (4TH FLR);  Service: Endoscopy;  Laterality: N/A;  8/4 fully vaccinated; instructions mailed-st    ESOPHAGOGASTRODUODENOSCOPY N/A 05/20/2020    Dr. Britton; small hiatal hernia; gastritis; 8 gastric polyps removed    ESOPHAGOGASTRODUODENOSCOPY N/A 04/01/2022    Procedure: EGD (ESOPHAGOGASTRODUODENOSCOPY);  Surgeon: Jose Britton MD;  Location: [facility];  Service: Endoscopy;  Laterality: N/A;    FOOT SURGERY      HYSTERECTOMY      TVH/BSO > 20 years    INCISION OF VULVA Bilateral 8/4/2023    Procedure: EXCISION, CYST, LABIA AND OTHER INDICATED PROCEDURES;  Surgeon: Mat Beach MD;  Location: [facility] OR;  Service: OB/GYN;  Laterality: Bilateral;  EXCYSION OF VULVAR CYST    INTRALUMINAL GASTROINTESTINAL TRACT IMAGING VIA CAPSULE N/A  06/04/2020    Procedure: IMAGING PROCEDURE, GI TRACT, INTRALUMINAL, VIA CAPSULE;  Surgeon: Jose Britton MD;  Location: [facility] ENDO;  Service: Endoscopy;  Laterality: N/A;    KNEE SURGERY  06/06/2013    right knee tear Dr Iverson     LAPAROSCOPIC CHOLECYSTECTOMY N/A 09/17/2020    Procedure: CHOLECYSTECTOMY, LAPAROSCOPIC;  Surgeon: Forrest Veronica MD;  Location: [facility] OR;  Service: General;  Laterality: N/A;    LUNG LOBECTOMY  1966    right middle lobectomy, due to Tb    OOPHORECTOMY      SHOULDER SURGERY      francis     THYROIDECTOMY Bilateral 05/19/2021    Procedure: THYROIDECTOMY;  Surgeon: Jamia Amezquita MD;  [facility];  Service: General;  Laterality: Bilateral;    THYROIDECTOMY Bilateral 09/13/2021    Procedure: THYROIDECTOMY WITH INTRA-OP PTH;  Surgeon: Ryan Torres MD;  [facility];  Service: ENT;  Laterality: Bilateral;  NIM tube  Intraop PTH        Review of Systems  Review of Systems   Constitutional:  Negative for fever and unexpected weight change.   Respiratory: Negative.     Cardiovascular:  Negative for chest pain and palpitations.   Gastrointestinal:  Negative for nausea and vomiting.   Genitourinary:  Negative for dysuria, hematuria and urgency.        Gyn as per HPI   Neurological:  Negative for headaches.           Objective:      Vitals:    10/18/23 1001   BP: (!) 140/60   Resp: 16     Physical Exam:   Constitutional: She appears well-developed and well-nourished.    HENT:   Head: Normocephalic.     Neck: No thyroid mass present.    Cardiovascular:  Normal rate.             Pulmonary/Chest: Effort normal. No respiratory distress.        Abdominal: Soft. There is no abdominal tenderness.     Genitourinary:    Inguinal canal, vagina, uterus, right adnexa and left adnexa normal.            Pelvic exam was performed with patient supine.   The external female genitalia was normal.   External genitalia lesions present. Cervix is normal. Right adnexum displays no  mass and no tenderness. Left adnexum displays no mass and no tenderness. No tenderness or bleeding in the vagina. Uterus is not tender. Normal urethral meatus.Urethra findings: no tendernessBladder findings: no bladder tenderness          Musculoskeletal: Normal range of motion.      Right lower leg: No edema.      Left lower leg: No edema.      Lymphadenopathy:     She has no cervical adenopathy. No inguinal adenopathy noted on the right or left side.    Neurological: She is alert.   No gross lesions noted.    Skin: Skin is warm and dry.    Psychiatric: She has a normal mood and affect. Her speech is normal and behavior is normal. Mood normal.         Recent Laboratory Results:    10/04/2023:  Aerobic culture noted skin le.  Anaerobic culture was presumptive for - PREVOTELLA/PORPHYROMONAS GROUP      Assessment:        1. Vulvar abscess           Plan:      Vulvar abscess      Follow up for as needed / for any GYN related issues.     The above was reviewed discussed with the patient.  At her last evaluation she was started on oral Bactrim which she is been using without difficulty.    There has been an improvement in her vulvar abscess.  We will continue to follow from a conservative standpoint.    The patient's questions regarding the above were answered and she is in agreement with the current plan.    Mat Beach MD  Department OBGYN [facility]

## 2023-10-18 NOTE — TELEPHONE ENCOUNTER
----- Message from Rei Painter sent at 10/18/2023  7:25 AM CDT -----  Regarding: Mammo  Type:  Needs Medical Advice    Who Called: Pt    Symptoms (please be specific): Mammo      Would the patient rather a call back or a response via [facility]? Call back    Best Call Back Number: [phone] or  [phone] (mobile)      Additional Information: Sts she didn't know about her Mammo orders that was supposed to be done in July.  Does he still want her to have it done?   Please advise -- Thank you

## 2023-10-24 ENCOUNTER — TELEPHONE (OUTPATIENT)
Dept: OPTOMETRY | Facility: CLINIC | Age: 78
End: 2023-10-24
Payer: MEDICARE

## 2023-10-24 NOTE — TELEPHONE ENCOUNTER
----- Message from Isabel Bowers sent at 10/24/2023  1:43 PM CDT -----  Regarding: advise  Contact: pt  Type: Needs Medical Advice  Who Called:  Patient  Symptoms (please be specific):  watery eyes/redness  How long has patient had these symptoms:    Pharmacy name and phone #:    Best Call Back Number: [phone]    Additional Information: Needs to be advised. Thanks!

## 2023-10-25 ENCOUNTER — LAB VISIT (OUTPATIENT)
Dept: LAB | Facility: HOSPITAL | Age: 78
End: 2023-10-25
Payer: MEDICARE

## 2023-10-25 ENCOUNTER — OFFICE VISIT (OUTPATIENT)
Dept: ENDOCRINOLOGY | Facility: CLINIC | Age: 78
End: 2023-10-25
Payer: MEDICARE

## 2023-10-25 VITALS
WEIGHT: 165.81 LBS | OXYGEN SATURATION: 99 % | HEART RATE: 83 BPM | BODY MASS INDEX: 26.65 KG/M2 | DIASTOLIC BLOOD PRESSURE: 80 MMHG | SYSTOLIC BLOOD PRESSURE: 130 MMHG | TEMPERATURE: 99 F | HEIGHT: 66 IN

## 2023-10-25 DIAGNOSIS — R73.03 PRE-DIABETES: ICD-10-CM

## 2023-10-25 DIAGNOSIS — E89.0 POSTOPERATIVE HYPOTHYROIDISM: ICD-10-CM

## 2023-10-25 DIAGNOSIS — E55.9 HYPOVITAMINOSIS D: ICD-10-CM

## 2023-10-25 DIAGNOSIS — C73 THYROID CANCER: ICD-10-CM

## 2023-10-25 DIAGNOSIS — Z78.0 POSTMENOPAUSAL: ICD-10-CM

## 2023-10-25 DIAGNOSIS — M85.88 OSTEOPENIA OF LUMBAR SPINE: ICD-10-CM

## 2023-10-25 DIAGNOSIS — I15.2 HYPERTENSION ASSOCIATED WITH DIABETES: ICD-10-CM

## 2023-10-25 DIAGNOSIS — C73 THYROID CANCER: Primary | ICD-10-CM

## 2023-10-25 DIAGNOSIS — E11.59 HYPERTENSION ASSOCIATED WITH DIABETES: ICD-10-CM

## 2023-10-25 LAB
25(OH)D3+25(OH)D2 SERPL-MCNC: 28 NG/ML (ref 30–96)
ALBUMIN SERPL BCP-MCNC: 3.8 G/DL (ref 3.5–5.2)
ALP SERPL-CCNC: 50 U/L (ref 55–135)
ALT SERPL W/O P-5'-P-CCNC: 21 U/L (ref 10–44)
ANION GAP SERPL CALC-SCNC: 12 MMOL/L (ref 8–16)
AST SERPL-CCNC: 26 U/L (ref 10–40)
BILIRUB SERPL-MCNC: 0.4 MG/DL (ref 0.1–1)
BUN SERPL-MCNC: 14 MG/DL (ref 8–23)
CALCIUM SERPL-MCNC: 8.6 MG/DL (ref 8.7–10.5)
CHLORIDE SERPL-SCNC: 104 MMOL/L (ref 95–110)
CO2 SERPL-SCNC: 25 MMOL/L (ref 23–29)
CREAT SERPL-MCNC: 1.2 MG/DL (ref 0.5–1.4)
EST. GFR  (NO RACE VARIABLE): 45.8 ML/MIN/1.73 M^2
ESTIMATED AVG GLUCOSE: 120 MG/DL (ref 68–131)
GLUCOSE SERPL-MCNC: 87 MG/DL (ref 70–110)
HBA1C MFR BLD: 5.8 % (ref 4–5.6)
POTASSIUM SERPL-SCNC: 3.9 MMOL/L (ref 3.5–5.1)
PROT SERPL-MCNC: 7.7 G/DL (ref 6–8.4)
SODIUM SERPL-SCNC: 141 MMOL/L (ref 136–145)

## 2023-10-25 PROCEDURE — 3079F DIAST BP 80-89 MM HG: CPT | Mod: CPTII,S$GLB,, | Performed by: PHYSICIAN ASSISTANT

## 2023-10-25 PROCEDURE — 1101F PT FALLS ASSESS-DOCD LE1/YR: CPT | Mod: CPTII,S$GLB,, | Performed by: PHYSICIAN ASSISTANT

## 2023-10-25 PROCEDURE — 1159F MED LIST DOCD IN RCRD: CPT | Mod: CPTII,S$GLB,, | Performed by: PHYSICIAN ASSISTANT

## 2023-10-25 PROCEDURE — 3288F PR FALLS RISK ASSESSMENT DOCUMENTED: ICD-10-PCS | Mod: CPTII,S$GLB,, | Performed by: PHYSICIAN ASSISTANT

## 2023-10-25 PROCEDURE — 99999 PR PBB SHADOW E&M-EST. PATIENT-LVL IV: ICD-10-PCS | Mod: PBBFAC,,, | Performed by: PHYSICIAN ASSISTANT

## 2023-10-25 PROCEDURE — 80053 COMPREHEN METABOLIC PANEL: CPT | Performed by: PHYSICIAN ASSISTANT

## 2023-10-25 PROCEDURE — 3075F SYST BP GE 130 - 139MM HG: CPT | Mod: CPTII,S$GLB,, | Performed by: PHYSICIAN ASSISTANT

## 2023-10-25 PROCEDURE — 82306 VITAMIN D 25 HYDROXY: CPT | Performed by: PHYSICIAN ASSISTANT

## 2023-10-25 PROCEDURE — 83036 HEMOGLOBIN GLYCOSYLATED A1C: CPT | Performed by: PHYSICIAN ASSISTANT

## 2023-10-25 PROCEDURE — 1126F PR PAIN SEVERITY QUANTIFIED, NO PAIN PRESENT: ICD-10-PCS | Mod: CPTII,S$GLB,, | Performed by: PHYSICIAN ASSISTANT

## 2023-10-25 PROCEDURE — 84443 ASSAY THYROID STIM HORMONE: CPT | Performed by: PHYSICIAN ASSISTANT

## 2023-10-25 PROCEDURE — 86800 THYROGLOBULIN ANTIBODY: CPT | Performed by: PHYSICIAN ASSISTANT

## 2023-10-25 PROCEDURE — 1126F AMNT PAIN NOTED NONE PRSNT: CPT | Mod: CPTII,S$GLB,, | Performed by: PHYSICIAN ASSISTANT

## 2023-10-25 PROCEDURE — 3079F PR MOST RECENT DIASTOLIC BLOOD PRESSURE 80-89 MM HG: ICD-10-PCS | Mod: CPTII,S$GLB,, | Performed by: PHYSICIAN ASSISTANT

## 2023-10-25 PROCEDURE — 1159F PR MEDICATION LIST DOCUMENTED IN MEDICAL RECORD: ICD-10-PCS | Mod: CPTII,S$GLB,, | Performed by: PHYSICIAN ASSISTANT

## 2023-10-25 PROCEDURE — 99213 OFFICE O/P EST LOW 20 MIN: CPT | Mod: S$GLB,,, | Performed by: PHYSICIAN ASSISTANT

## 2023-10-25 PROCEDURE — 1160F PR REVIEW ALL MEDS BY PRESCRIBER/CLIN PHARMACIST DOCUMENTED: ICD-10-PCS | Mod: CPTII,S$GLB,, | Performed by: PHYSICIAN ASSISTANT

## 2023-10-25 PROCEDURE — 84439 ASSAY OF FREE THYROXINE: CPT | Performed by: PHYSICIAN ASSISTANT

## 2023-10-25 PROCEDURE — 1101F PR PT FALLS ASSESS DOC 0-1 FALLS W/OUT INJ PAST YR: ICD-10-PCS | Mod: CPTII,S$GLB,, | Performed by: PHYSICIAN ASSISTANT

## 2023-10-25 PROCEDURE — 3288F FALL RISK ASSESSMENT DOCD: CPT | Mod: CPTII,S$GLB,, | Performed by: PHYSICIAN ASSISTANT

## 2023-10-25 PROCEDURE — 36415 COLL VENOUS BLD VENIPUNCTURE: CPT | Mod: PO | Performed by: PHYSICIAN ASSISTANT

## 2023-10-25 PROCEDURE — 3075F PR MOST RECENT SYSTOLIC BLOOD PRESS GE 130-139MM HG: ICD-10-PCS | Mod: CPTII,S$GLB,, | Performed by: PHYSICIAN ASSISTANT

## 2023-10-25 PROCEDURE — 99999 PR PBB SHADOW E&M-EST. PATIENT-LVL IV: CPT | Mod: PBBFAC,,, | Performed by: PHYSICIAN ASSISTANT

## 2023-10-25 PROCEDURE — 1160F RVW MEDS BY RX/DR IN RCRD: CPT | Mod: CPTII,S$GLB,, | Performed by: PHYSICIAN ASSISTANT

## 2023-10-25 PROCEDURE — 99213 PR OFFICE/OUTPT VISIT, EST, LEVL III, 20-29 MIN: ICD-10-PCS | Mod: S$GLB,,, | Performed by: PHYSICIAN ASSISTANT

## 2023-10-25 NOTE — PROGRESS NOTES
Of note, pt prefers phone call with results. Doesn't check portal all the time.    Subjective:      Chief Complaint:  Thyroid Cancer    HPI: Erica Masterson is a 78 y.o. female who is here for a follow-up evaluation for thyroid. Last seen 5/23 by Dr. Landrum.    For her thyroid cancer:   Nodules were found several years ago, since at least 2019. Established care in endocrine here 3/2021 after repeat US still showed MNG. FNA recommended, completed 4/29/2021 and showed PTC.    Treated with total thyroidectomy on 5/2021.   Path: 2 cm, positive margin on the left. Also multifocal (4 foci, 2-5 mm)   right lobe: benign   no lymph nodes assessed   No angioinvasion, no no lymphatic invasion  Some tumor remained in situ, densely adherent/invading the trachea and RLN.    Post-op thyroglobulin: high, 91.   Presented at tumor board.  consensus was try for repeat surgery then radioactive iodine. Surgery team recommended CT to evaluate for LN.  CT done, found several abnormal lymph nodes, concerning for malignancy.  Saw ENT, had repeat surgery 9/13/2021 5/30 LN + for malignancy.    She was then treated with radioactive iodine 168.7 mCi on 12/15/2021.  Thyrogen-stimulated thyroglobulin level was 12 (TSH 92)  Post-treatment WBS 12/22/2021. Uptake in the neck, no distant disease noted.    Post-operative course has been complicated by neck tightness. Followed with ENT, s/p PT, seeing ortho as well with concerns for spinal accessory nerve injury.    Surveillance:  Last thyroglobulin:  Lab Results   Component Value Date    THYGLBTUM 16 (H) 05/22/2023    THGABSCRN <1.8 05/22/2023     Last neck US: 9/2022.   Reactive lymph nodes and scar tissue seen    With regards to her post-surgical hypothyroidism:  Current medication:  generic levothyroxine  Current dose: 75 mcg daily.     Lab Results   Component Value Date    TSH 3.554 05/22/2023    FREET4 1.09 05/22/2023     Had low vitamin D:   Vitamin D: was 11 (4/2021)    Last labs:  Lab  Results   Component Value Date    CALCIUM 8.6 (L) 05/22/2023    ALBUMIN 4.1 05/22/2023    CREATININE 1.1 05/22/2023    [id number] 29 (L) 05/22/2023    PTH 41.0 05/22/2023      Now on 50,000 units/month    Bones: DXA 4/2021. Osteopenia. -2.4 spine    Current symptoms:  No   Yes  [x]    []  Trouble swallowing  [x]    []  Trouble breathing  []    [x]  Voice changes. After surgery. Improving.  [x]    []  Neck swelling    [x]    []  Fatigue. Energy okay, exercise a few days per week.  [x]    []  Constipation/diarrhea.  []    [x]  Heat/Cold intolerance  []    [x]  Weight gain or weight loss. Few lbs. Working on diet/exercise lately  [x]    []  Palpitations or tremor    Exercise:   Walking 30 min qd  [employer] stretching class  Yoga 2x weekly    No falls or fractures.   Some back pains. S/p steroid shot    Reviewed past medical, family, social history and updated as appropriate.    Review of Systems  As above    Objective:     Vitals:    10/25/23 1412   BP: 130/80   Pulse: 83   Temp: 98.6 °F (37 °C)     BP Readings from Last 5 Encounters:   10/25/23 130/80   10/18/23 (!) 140/60   10/04/23 (!) 152/60   08/22/23 (!) 132/58   08/22/23 132/73     Physical Exam  Vitals reviewed.   Constitutional:       General: She is not in acute distress.  Neck:      Thyroid: No thyroid mass, thyromegaly or thyroid tenderness.      Comments: Absent thyroid, +scar   Pulmonary:      Effort: Pulmonary effort is normal.     Wt Readings from Last 10 Encounters:   10/25/23 1412 75.2 kg (165 lb 12.6 oz)   10/18/23 1001 73.4 kg (161 lb 13.1 oz)   10/04/23 0920 73.4 kg (161 lb 13.1 oz)   08/22/23 1444 72.9 kg (160 lb 11.5 oz)   08/22/23 1134 71.7 kg (158 lb)   08/04/23 0741 72.1 kg (158 lb 15.2 oz)   08/01/23 0614 72.1 kg (159 lb)   07/17/23 0811 72.3 kg (159 lb 6.3 oz)   07/14/23 1335 72.3 kg (159 lb 6.3 oz)   07/18/23 1002 72.3 kg (159 lb 6.3 oz)   07/06/23 1455 72.3 kg (159 lb 6.3 oz)   06/29/23 1357 73.7 kg (162 lb 7.7 oz)     Lab Results   Component  Value Date    HGBA1C 6.1 (H) 01/25/2023     Lab Results   Component Value Date    CHOL 154 08/08/2022    HDL 59 08/08/2022    LDLCALC 86.4 08/08/2022    TRIG 43 08/08/2022    CHOLHDL 38.3 08/08/2022     Lab Results   Component Value Date     05/22/2023    K 3.9 05/22/2023     05/22/2023    CO2 27 05/22/2023     (H) 05/22/2023    BUN 16 05/22/2023    CREATININE 1.1 05/22/2023    CALCIUM 8.6 (L) 05/22/2023    PROT 7.8 05/22/2023    ALBUMIN 4.1 05/22/2023    BILITOT 0.5 05/22/2023    ALKPHOS 45 (L) 05/22/2023    AST 22 05/22/2023    ALT 18 05/22/2023    ANIONGAP 10 05/22/2023    ESTGFRAFRICA 56 (A) 07/19/2022    EGFRNONAA 48 (A) 07/19/2022    TSH 3.554 05/22/2023        Assessment/Plan:     Thyroid cancer  s/p surgery 5/2021. Multifocal PTC. 2 cm, +margin, +residual tissue.   Then s/p completion thyroidectomy 9/2021. Pathology with multiple LN + for cancer (5/30)  Post-operative thyroglobulin as high as 91 initially.  S/p radioactive iodine ablation with 168.7 mCi 12/15/2021.     - stimulated TG was 12     - post-treatment WBS negative for distant disease    For now, goal TSH on the low side.    Last thyroglobulin remains elevated, increasing from prior, up to 16.   higher than expected given surgery and the amount of iodine given.   with negative WBS on post-treatment study, not really finding many abnormalities on neck US, PET scan was negative    Otherwise if unable to localize anything, continue to monitor for now with labs and US in 6 months    Postoperative hypothyroidism  Goal TSH <0.4  TFTs today      Osteopenia of lumbar spine   DXA 4/2021 -2.4 tscore in spine. FRAX not elevated.   continue vitamin D intake, calcium   avoid falls   repeat DXA   Continue exercise    Pre-Diabetes  Check a1c    Thyroid u/s  TFTs, Tg, cmp, vd  F/u in 6 mths w/ labs prior

## 2023-10-25 NOTE — TELEPHONE ENCOUNTER
No care due was identified.  [facility] Embedded Care Due Messages. Reference number: [id number].   10/25/2023 2:08:42 PM CDT

## 2023-10-26 LAB
T4 FREE SERPL-MCNC: 0.93 NG/DL (ref 0.71–1.51)
TSH SERPL DL<=0.005 MIU/L-ACNC: 0.32 UIU/ML (ref 0.4–4)

## 2023-10-26 RX ORDER — AMLODIPINE BESYLATE 2.5 MG/1
2.5 TABLET ORAL
Qty: 90 TABLET | Refills: 0 | Status: SHIPPED | OUTPATIENT
Start: 2023-10-26

## 2023-10-26 NOTE — TELEPHONE ENCOUNTER
Refill Routing Note   Medication(s) are not appropriate for processing by [facility] for the following reason(s):      Required vitals abnormal    [facility] action(s):  Defer Care Due:  None identified            Appointments  past 12m or future 3m with PCP    Date Provider   Last Visit   1/25/2023 Narayan Myers MD   Next Visit   1/2/2024 Narayan Myers MD   ED visits in past 90 days: 0        Note composed:7:26 AM 10/26/2023

## 2023-10-27 LAB
THRYOGLOBULIN INTERPRETATION: ABNORMAL
THYROGLOB AB SERPL-ACNC: <1.8 IU/ML
THYROGLOB SERPL-MCNC: 7.2 NG/ML

## 2023-10-30 ENCOUNTER — TELEPHONE (OUTPATIENT)
Dept: FAMILY MEDICINE | Facility: CLINIC | Age: 78
End: 2023-10-30
Payer: MEDICARE

## 2023-10-30 NOTE — TELEPHONE ENCOUNTER
Spoke to patient   She is complaining of nasal congestion and nausea Appointment scheduled 10/31/2023 with Karen Soto

## 2023-10-30 NOTE — TELEPHONE ENCOUNTER
Left message on machine to call.       ----- Message from Jessica Garcia sent at 10/30/2023  7:42 AM CDT -----  Contact: patient  Type:  Same Day Appointment Request    Caller is requesting a same day appointment.  Caller declined first available appointment listed below.      Name of Caller:  patient  When is the first available appointment?  11/15  Symptoms:  feeling weak, nausea  Best Call Back Number:  [phone] (home)   Additional Information:

## 2023-10-31 ENCOUNTER — HOSPITAL ENCOUNTER (OUTPATIENT)
Dept: RADIOLOGY | Facility: HOSPITAL | Age: 78
Discharge: HOME | End: 2023-10-31
Payer: MEDICARE

## 2023-10-31 ENCOUNTER — TELEPHONE (OUTPATIENT)
Dept: PAIN MEDICINE | Facility: CLINIC | Age: 78
End: 2023-10-31

## 2023-10-31 ENCOUNTER — OFFICE VISIT (OUTPATIENT)
Dept: PAIN MEDICINE | Facility: CLINIC | Age: 78
End: 2023-10-31
Payer: MEDICARE

## 2023-10-31 VITALS
HEART RATE: 75 BPM | DIASTOLIC BLOOD PRESSURE: 77 MMHG | WEIGHT: 165 LBS | BODY MASS INDEX: 26.52 KG/M2 | SYSTOLIC BLOOD PRESSURE: 134 MMHG | HEIGHT: 66 IN

## 2023-10-31 DIAGNOSIS — M46.1 SACROILIITIS: Primary | ICD-10-CM

## 2023-10-31 DIAGNOSIS — M25.561 CHRONIC PAIN OF RIGHT KNEE: ICD-10-CM

## 2023-10-31 DIAGNOSIS — G89.29 CHRONIC PAIN OF RIGHT KNEE: ICD-10-CM

## 2023-10-31 DIAGNOSIS — M54.16 LUMBAR RADICULOPATHY: ICD-10-CM

## 2023-10-31 DIAGNOSIS — M53.3 CHRONIC RIGHT SI JOINT PAIN: ICD-10-CM

## 2023-10-31 DIAGNOSIS — G89.29 CHRONIC RIGHT SI JOINT PAIN: ICD-10-CM

## 2023-10-31 DIAGNOSIS — E55.9 VITAMIN D INSUFFICIENCY: ICD-10-CM

## 2023-10-31 DIAGNOSIS — M85.88 OSTEOPENIA OF LUMBAR SPINE: ICD-10-CM

## 2023-10-31 PROCEDURE — 99999 PR PBB SHADOW E&M-EST. PATIENT-LVL III: CPT | Mod: PBBFAC,,,

## 2023-10-31 PROCEDURE — 99214 PR OFFICE/OUTPT VISIT, EST, LEVL IV, 30-39 MIN: ICD-10-PCS | Mod: S$GLB,,,

## 2023-10-31 PROCEDURE — 73560 XR KNEE 1 OR 2 VIEW RIGHT: ICD-10-PCS | Mod: 26,RT,, | Performed by: RADIOLOGY

## 2023-10-31 PROCEDURE — 1101F PR PT FALLS ASSESS DOC 0-1 FALLS W/OUT INJ PAST YR: ICD-10-PCS | Mod: CPTII,S$GLB,,

## 2023-10-31 PROCEDURE — 1125F AMNT PAIN NOTED PAIN PRSNT: CPT | Mod: CPTII,S$GLB,,

## 2023-10-31 PROCEDURE — 1159F PR MEDICATION LIST DOCUMENTED IN MEDICAL RECORD: ICD-10-PCS | Mod: CPTII,S$GLB,,

## 2023-10-31 PROCEDURE — 1159F MED LIST DOCD IN RCRD: CPT | Mod: CPTII,S$GLB,,

## 2023-10-31 PROCEDURE — 3075F SYST BP GE 130 - 139MM HG: CPT | Mod: CPTII,S$GLB,,

## 2023-10-31 PROCEDURE — 99999 PR PBB SHADOW E&M-EST. PATIENT-LVL III: ICD-10-PCS | Mod: PBBFAC,,,

## 2023-10-31 PROCEDURE — 3075F PR MOST RECENT SYSTOLIC BLOOD PRESS GE 130-139MM HG: ICD-10-PCS | Mod: CPTII,S$GLB,,

## 2023-10-31 PROCEDURE — 99214 OFFICE O/P EST MOD 30 MIN: CPT | Mod: S$GLB,,,

## 2023-10-31 PROCEDURE — 1125F PR PAIN SEVERITY QUANTIFIED, PAIN PRESENT: ICD-10-PCS | Mod: CPTII,S$GLB,,

## 2023-10-31 PROCEDURE — 1101F PT FALLS ASSESS-DOCD LE1/YR: CPT | Mod: CPTII,S$GLB,,

## 2023-10-31 PROCEDURE — 3078F DIAST BP <80 MM HG: CPT | Mod: CPTII,S$GLB,,

## 2023-10-31 PROCEDURE — 3288F FALL RISK ASSESSMENT DOCD: CPT | Mod: CPTII,S$GLB,,

## 2023-10-31 PROCEDURE — 3288F PR FALLS RISK ASSESSMENT DOCUMENTED: ICD-10-PCS | Mod: CPTII,S$GLB,,

## 2023-10-31 PROCEDURE — 73560 X-RAY EXAM OF KNEE 1 OR 2: CPT | Mod: TC,RT

## 2023-10-31 PROCEDURE — 3078F PR MOST RECENT DIASTOLIC BLOOD PRESSURE < 80 MM HG: ICD-10-PCS | Mod: CPTII,S$GLB,,

## 2023-10-31 PROCEDURE — 73560 X-RAY EXAM OF KNEE 1 OR 2: CPT | Mod: 26,RT,, | Performed by: RADIOLOGY

## 2023-10-31 RX ORDER — ERGOCALCIFEROL 1.25 MG/1
CAPSULE ORAL
Qty: 6 CAPSULE | Refills: 3 | Status: SHIPPED | OUTPATIENT
Start: 2023-10-31

## 2023-10-31 NOTE — PROGRESS NOTES
"  SUBJECTIVE:    Patient ID: Erica Masterson is a 78 y.o. female.    Chief Complaint: Follow-up (8 Week follow up, right leg pain has increased, upper left back muscle pain has gotten worst as well.)  INTERVAL HPI:   Erica Masterson is a 78 y.o. female who presents today as an established patient for management of right hip and leg pain. The patient localizes her pain to the area of her lower back that radiates into the right side of her buttocks and hip. The patient denies radiation into her groin. The patient reports her right sided buttocks and hip pain are the worse of her pain. The patient also reports of right knee pain. The patient also endorses neck and shoulder pain.  The patient reports that it only occurs at night. The patient continues to report of lower back pain > neck pain.  The patient reports her pain is currently tolerable.  The patient reports she exercises 3 x a week and has recently restarted yoga. In regards to her neck pain, the patient reports of "tightness" in the base of her neck and into her left shoulder. The patient reports of neck pain radiating down the RUE without numbness and tingling. The patient has had botox injections in the past with Dr. Montgomery for cervical dystonia without meaningful relief.  The patient would like to address the numbness in her feet first.  The patient reports that her pain is a 8/10. Patient denies of any urinary/fecal incontinence, saddle anesthesia, or weakness.      HPI: ( PER DR. WALTON)  She presents today for follow-up having completed her course of physical therapy for neck and low back pain.  She does find physical therapy beneficial however she is still not satisfied with her quality of life.  Her primary complaint is of low back pain at the lumbosacral junction radiates into the posterior portion of the right leg and into the calf.  Secondary complaint of posterior neck discomfort.  She is also complaining of some mid to lower thoracic pain.  All of " these are familiar symptoms.  Her pain level is 5/10    Pain Scales  Best:/10  Worst:  Usually:  Today: 3/10    Follow-up  This is a chronic problem. The problem has been gradually improving. Associated symptoms include myalgias, neck pain and numbness. Pertinent negatives include no abdominal pain, arthralgias, chest pain, chills, diaphoresis, fatigue, fever, headaches, joint swelling, nausea, vomiting or weakness.     Interventional Pain Procedures:   7/20/2023: Interlaminar epidural steroid injection at L5-S1-80% relief.     Past Medical History:   Diagnosis Date    Adenomatous polyp of colon 3/26/2012    Allergy     Anxiety     Cancer     Cataract     Colon polyp     Degenerative arthritis of knee 04/03/2012    Diverticulosis     Encounter for blood transfusion     GERD (gastroesophageal reflux disease)     History of thyroid cancer 2021    Hyperlipidemia     Hypertension     Lateral meniscal tear 5/20/2013    Multinodular goiter 03/26/2012    Myopathy, unspecified 01/18/2010    Tuberculosis      Social History     Socioeconomic History    Marital status:    Tobacco Use    Smoking status: Never    Smokeless tobacco: Never   Substance and Sexual Activity    Alcohol use: Not Currently     Comment: stopped 2019    Drug use: No    Sexual activity: Not Currently     Social Determinants of Health     Financial Resource Strain: Low Risk  (1/19/2023)    Overall Financial Resource Strain (CARDIA)     Difficulty of Paying Living Expenses: Not hard at all   Food Insecurity: No Food Insecurity (1/19/2023)    Hunger Vital Sign     Worried About Running Out of Food in the Last Year: Never true     Ran Out of Food in the Last Year: Never true   Transportation Needs: No Transportation Needs (1/19/2023)    PRAPARE - Transportation     Lack of Transportation (Medical): No     Lack of Transportation (Non-Medical): No   Physical Activity: Sufficiently Active (1/19/2023)    Exercise Vital Sign     Days of Exercise per Week: 3  days     Minutes of Exercise per Session: 60 min   Stress: No Stress Concern Present (1/19/2023)    [employer] - Occupational Stress Questionnaire     Feeling of Stress : Not at all   Social Connections: Moderately Integrated (1/19/2023)    Social Connection and Isolation Panel [NHANES]     Frequency of Communication with Friends and Family: Three times a week     Frequency of Social Gatherings with Friends and Family: Three times a week     Attends Rastafarian Services: More than 4 times per year     Active Member of Clubs or Organizations: No     Attends Club or Organization Meetings: Never     Marital Status:    Housing Stability: Low Risk  (1/19/2023)    Housing Stability Vital Sign     Unable to Pay for Housing in the Last Year: No     Number of Places Lived in the Last Year: 1     Unstable Housing in the Last Year: No     Past Surgical History:   Procedure Laterality Date    BREAST BIOPSY Left 2015    benign    CHOLECYSTECTOMY      COLONOSCOPY  12/02/2015    Dr. Britton, multiple polyps, recheck five years-three years if more than 2 polyps are adenomatous    COLONOSCOPY N/A 12/02/2015    COLONOSCOPY N/A 03/20/2019    Procedure: COLONOSCOPY;  Surgeon: Jose Britton MD;  Location: [facility];  Service: Endoscopy;  Laterality: N/A;    COLONOSCOPY N/A 05/20/2020    Dr. Britton; internal hemorrhoids; diverticulosis; polyps removed; repeat in 3 years    CYSTOSCOPY N/A 08/26/2020    Procedure: CYSTOSCOPY;  Surgeon: Charlotte Walters Jr., MD;  Location: [facility] OR;  Service: Urology;  Laterality: N/A;    DILATION AND CURETTAGE OF UTERUS      DISSECTION OF NECK Left 09/13/2021    Procedure: DISSECTION, NECK;  Surgeon: Ryan Torres MD;  Location: [address];  Service: ENT;  Laterality: Left;    EPIDURAL STEROID INJECTION INTO LUMBAR SPINE N/A 7/20/2023    Procedure: Injection-steroid-epidural-lumbar;  Surgeon: Julián Cihang MD;  Location: [facility] OR;  Service: Pain Management;   Laterality: N/A;  L5-s1    ESOPHAGEAL MANOMETRY WITH MEASUREMENT OF IMPEDANCE N/A 08/22/2022    Procedure: MANOMETRY, ESOPHAGUS, WITH IMPEDANCE MEASUREMENT;  Surgeon: Pantera Mcguire MD;  Location: [facility] ENDO (4TH FLR);  Service: Endoscopy;  Laterality: N/A;  8/4 fully vaccinated; instructions mailed-st    ESOPHAGOGASTRODUODENOSCOPY N/A 05/20/2020    Dr. Britton; small hiatal hernia; gastritis; 8 gastric polyps removed    ESOPHAGOGASTRODUODENOSCOPY N/A 04/01/2022    Procedure: EGD (ESOPHAGOGASTRODUODENOSCOPY);  Surgeon: Jose Britton MD;  Location: [facility];  Service: Endoscopy;  Laterality: N/A;    FOOT SURGERY      HYSTERECTOMY      TVH/BSO > 20 years    INCISION OF VULVA Bilateral 8/4/2023    Procedure: EXCISION, CYST, LABIA AND OTHER INDICATED PROCEDURES;  Surgeon: Mat Beach MD;  Location: [facility] OR;  Service: OB/GYN;  Laterality: Bilateral;  EXCYSION OF VULVAR CYST    INTRALUMINAL GASTROINTESTINAL TRACT IMAGING VIA CAPSULE N/A 06/04/2020    Procedure: IMAGING PROCEDURE, GI TRACT, INTRALUMINAL, VIA CAPSULE;  Surgeon: Jose Britton MD;  Location: [facility];  Service: Endoscopy;  Laterality: N/A;    KNEE SURGERY  06/06/2013    right knee tear Dr Iverson     LAPAROSCOPIC CHOLECYSTECTOMY N/A 09/17/2020    Procedure: CHOLECYSTECTOMY, LAPAROSCOPIC;  Surgeon: Forrest Veronica MD;  Location[facility];  Service: General;  Laterality: N/A;    LUNG LOBECTOMY  1966    right middle lobectomy, due to Tb    OOPHORECTOMY      SHOULDER SURGERY      francis     THYROIDECTOMY Bilateral 05/19/2021    Procedure: THYROIDECTOMY;  Surgeon: Jamia Amezquita MD;  Location: [facility] OR 2ND FLR;  Service: General;  Laterality: Bilateral;    THYROIDECTOMY Bilateral 09/13/2021    Procedure: THYROIDECTOMY WITH INTRA-OP PTH;  Surgeon: Ryan Torres MD;  Location: [facility] OR 2ND FLR;  Service: ENT;  Laterality: Bilateral;  NIM tube  Intraop PTH     Family History   Problem Relation Age of Onset    Colon cancer Other     Cancer Mother      "Hypertension Mother     Hyperlipidemia Mother     Cancer Brother     Hypertension Father     Emphysema Father     Diabetes Son     Hypertension Son     Alcohol abuse Son     Diabetes Maternal Aunt     Alzheimer's disease Maternal Uncle     Cancer Maternal Grandmother     No Known Problems Daughter     Dementia Sister     Alzheimer's disease Sister     Breast cancer Sister 60    Obesity Paternal Uncle     Prostate cancer Other     Cancer Brother     Melanoma Neg Hx     Psoriasis Neg Hx     Lupus Neg Hx     Eczema Neg Hx     Amblyopia Neg Hx     Blindness Neg Hx     Cataracts Neg Hx     Glaucoma Neg Hx     Macular degeneration Neg Hx     Retinal detachment Neg Hx     Strabismus Neg Hx     Stroke Neg Hx     Thyroid cancer Neg Hx     Colon polyps Neg Hx     Ulcerative colitis Neg Hx     Stomach cancer Neg Hx     Rectal cancer Neg Hx      Vitals:    10/31/23 0854   BP: 134/77   Pulse: 75   Weight: 74.8 kg (165 lb)   Height: 5' 6" (1.676 m)       Review of Systems   Constitutional:  Negative for chills, diaphoresis, fatigue, fever and unexpected weight change.   HENT:  Negative for trouble swallowing.    Eyes:  Negative for visual disturbance.   Respiratory:  Negative for shortness of breath.    Cardiovascular:  Negative for chest pain.   Gastrointestinal:  Negative for abdominal pain, constipation, nausea and vomiting.   Genitourinary:  Negative for difficulty urinating.   Musculoskeletal:  Positive for back pain, gait problem, myalgias and neck pain. Negative for arthralgias, joint swelling and neck stiffness.   Neurological:  Positive for numbness. Negative for dizziness, speech difficulty, weakness, light-headedness and headaches.   All other systems reviewed and are negative.         Objective:      Physical Exam  Vitals and nursing note reviewed.   Musculoskeletal:         General: Tenderness present.   Skin:     General: Skin is warm and dry.   Neurological:      Mental Status: She is alert and oriented to person, " place, and time.   Psychiatric:         Mood and Affect: Mood normal.         LUMBAR SPINE  Lumbar spine: ROM is limited with flexion extension and oblique extension with increased pain with extension.     ((+)) Supine straight leg raise left side   ((-)) Facet loading   Internal and external rotation of the hip causes no increased pain .  Myofascial exam: Tenderness to palpation across lumbar parapsinous processes      (+) TTP at the SI joint to the right  ((+)) JOSE's test  (+)) One leg stand    ((+)) Distraction test  ((+)) Thigh thrust     Assessment:     Erica Masterson is  78 y.o. female who presents today as an established patient.  The patient does have some reproducible pain with SI Joint provocation to the right side.    1. Sacroiliitis    2. Chronic pain of right knee    3. Chronic right SI joint pain    4. Lumbar radiculopathy               Plan:     - Reviewed pertinent imaging and records with patient   - continue to monitor progress of Interlaminar DWAYNE at L5-S1   -  Schedule the patient for right side SI joint injection.    - Ordered xray of right knee. Will call with results.   - RTC for the procedure as outlined above   Sania Stephen NP       Sacroiliitis  -     Procedure Order to Pain Management; Future; Expected date: 10/31/2023    Chronic pain of right knee  -     X-Ray Knee 1 or 2 View Right; Future; Expected date: 10/31/2023    Chronic right SI joint pain  -     Procedure Order to Pain Management; Future; Expected date: 10/31/2023    Lumbar radiculopathy

## 2023-10-31 NOTE — TELEPHONE ENCOUNTER
Please advise the patient that I have reviewed the xray of her right knee. It is significant for severe degenerative changes. We can offer her an in office knee injection with steroid or place a referral to Orthopedics to address.     Thank you,

## 2023-10-31 NOTE — TELEPHONE ENCOUNTER
Spoke with pt and gave x ray results and scheduled for 12/01  for SI injection went over instructions with pt.

## 2023-10-31 NOTE — TELEPHONE ENCOUNTER
Types of orders made on 10/31/2023: Imaging, Procedure Request      Order Date:10/31/2023   Ordering User:SANIA BARROSO [[id number]]   Encounter Provider:Sania Barroso NP [id number]]   Authorizing PUJA mo: Sania Braroso NP [id number]]   Supervising Provider:HANNY CONNELL [[id number]]   Type of Supervision:Collaborating Physician   Department:[facility] PAIN MANAGEMENT[[id number]]      Common Order Information   Procedure -> Sacroiliac Injection (Specify laterality) Cmt: right      Pre-op Diagnosis -> Sacroiliitis        -> SI (sacroiliac) joint dysfunction       Order Specific Information   Order: Procedure Order to Pain Management [C   ustom: [id number]]  Order #:           [id number]: 1 FUTURE     Priority: Routine  Class: Clinic Performed     Future Order Information       Expires on:10/31/2024            Expected by:10/31/2023                    Associated Diagnoses       M46.1 Sacroiliitis       M53.3, G89.29 Chronic right SI joint pain       Physician -> dr. connell           Is patient on anti-coagulants? -> No           Facility Name: -> [facility]

## 2023-10-31 NOTE — PROGRESS NOTES
Called and spoke with pt regarding lab results.  Explained the lab results from the result note from Ms. Rosendo.  Pt verbalized understanding.

## 2023-11-01 ENCOUNTER — HOSPITAL ENCOUNTER (OUTPATIENT)
Dept: RADIOLOGY | Facility: CLINIC | Age: 78
Discharge: HOME | End: 2023-11-01
Attending: INTERNAL MEDICINE
Payer: MEDICARE

## 2023-11-01 DIAGNOSIS — M85.88 OSTEOPENIA OF LUMBAR SPINE: ICD-10-CM

## 2023-11-01 PROCEDURE — 77080 DXA BONE DENSITY AXIAL SKELETON 1 OR MORE SITES: ICD-10-PCS | Mod: 26,,, | Performed by: RADIOLOGY

## 2023-11-01 PROCEDURE — 77080 DXA BONE DENSITY AXIAL: CPT | Mod: 26,,, | Performed by: RADIOLOGY

## 2023-11-01 PROCEDURE — 77080 DXA BONE DENSITY AXIAL: CPT | Mod: TC,PO

## 2023-11-03 ENCOUNTER — HOSPITAL ENCOUNTER (OUTPATIENT)
Dept: RADIOLOGY | Facility: HOSPITAL | Age: 78
Discharge: HOME | End: 2023-11-03
Attending: INTERNAL MEDICINE
Payer: MEDICARE

## 2023-11-03 DIAGNOSIS — C73 THYROID CANCER: ICD-10-CM

## 2023-11-03 PROCEDURE — 76536 US SOFT TISSUE HEAD NECK THYROID: ICD-10-PCS | Mod: 26,,, | Performed by: RADIOLOGY

## 2023-11-03 PROCEDURE — 76536 US EXAM OF HEAD AND NECK: CPT | Mod: 26,,, | Performed by: RADIOLOGY

## 2023-11-03 PROCEDURE — 76536 US EXAM OF HEAD AND NECK: CPT | Mod: TC

## 2023-11-06 ENCOUNTER — TELEPHONE (OUTPATIENT)
Dept: GASTROENTEROLOGY | Facility: CLINIC | Age: 78
End: 2023-11-06
Payer: MEDICARE

## 2023-11-06 NOTE — TELEPHONE ENCOUNTER
Returned call to patient to clarify that her injection in her knee will not interfere with her colonoscopy.

## 2023-11-06 NOTE — TELEPHONE ENCOUNTER
----- Message from Angelia Jeffries sent at 11/6/2023  8:09 AM CST -----  Contact: Patient  Type:  Needs Medical Advice    Who Called:   Patient    Would the patient rather a call back or a response via [facility]?   Call back  Best Call Back Number:   [phone] / after 9 AM please call [phone]    Additional Information:   States she is having an injection tomorrow with Pain Management and wants to know if that will interfere with her colonoscopy on 11/16 - please call to advise - thank you

## 2023-11-07 ENCOUNTER — OFFICE VISIT (OUTPATIENT)
Dept: PAIN MEDICINE | Facility: CLINIC | Age: 78
End: 2023-11-07
Payer: MEDICARE

## 2023-11-07 VITALS
HEIGHT: 66 IN | SYSTOLIC BLOOD PRESSURE: 138 MMHG | WEIGHT: 165 LBS | HEART RATE: 74 BPM | DIASTOLIC BLOOD PRESSURE: 76 MMHG | BODY MASS INDEX: 26.52 KG/M2

## 2023-11-07 DIAGNOSIS — M17.11 OSTEOARTHRITIS OF RIGHT KNEE, UNSPECIFIED OSTEOARTHRITIS TYPE: Primary | ICD-10-CM

## 2023-11-07 DIAGNOSIS — M25.561 ACUTE PAIN OF RIGHT KNEE: ICD-10-CM

## 2023-11-07 PROCEDURE — 1160F PR REVIEW ALL MEDS BY PRESCRIBER/CLIN PHARMACIST DOCUMENTED: ICD-10-PCS | Mod: CPTII,S$GLB,,

## 2023-11-07 PROCEDURE — 3288F FALL RISK ASSESSMENT DOCD: CPT | Mod: CPTII,S$GLB,,

## 2023-11-07 PROCEDURE — 3075F PR MOST RECENT SYSTOLIC BLOOD PRESS GE 130-139MM HG: ICD-10-PCS | Mod: CPTII,S$GLB,,

## 2023-11-07 PROCEDURE — 20610 LARGE JOINT ASPIRATION/INJECTION: R KNEE: ICD-10-PCS | Mod: RT,S$GLB,,

## 2023-11-07 PROCEDURE — 3078F PR MOST RECENT DIASTOLIC BLOOD PRESSURE < 80 MM HG: ICD-10-PCS | Mod: CPTII,S$GLB,,

## 2023-11-07 PROCEDURE — 3288F PR FALLS RISK ASSESSMENT DOCUMENTED: ICD-10-PCS | Mod: CPTII,S$GLB,,

## 2023-11-07 PROCEDURE — 1126F PR PAIN SEVERITY QUANTIFIED, NO PAIN PRESENT: ICD-10-PCS | Mod: CPTII,S$GLB,,

## 2023-11-07 PROCEDURE — 99999 PR PBB SHADOW E&M-EST. PATIENT-LVL III: ICD-10-PCS | Mod: PBBFAC,,,

## 2023-11-07 PROCEDURE — 3075F SYST BP GE 130 - 139MM HG: CPT | Mod: CPTII,S$GLB,,

## 2023-11-07 PROCEDURE — 1126F AMNT PAIN NOTED NONE PRSNT: CPT | Mod: CPTII,S$GLB,,

## 2023-11-07 PROCEDURE — 99214 PR OFFICE/OUTPT VISIT, EST, LEVL IV, 30-39 MIN: ICD-10-PCS | Mod: 25,S$GLB,,

## 2023-11-07 PROCEDURE — 3078F DIAST BP <80 MM HG: CPT | Mod: CPTII,S$GLB,,

## 2023-11-07 PROCEDURE — 99999 PR PBB SHADOW E&M-EST. PATIENT-LVL III: CPT | Mod: PBBFAC,,,

## 2023-11-07 PROCEDURE — 99214 OFFICE O/P EST MOD 30 MIN: CPT | Mod: 25,S$GLB,,

## 2023-11-07 PROCEDURE — 20610 DRAIN/INJ JOINT/BURSA W/O US: CPT | Mod: RT,S$GLB,,

## 2023-11-07 PROCEDURE — 1159F PR MEDICATION LIST DOCUMENTED IN MEDICAL RECORD: ICD-10-PCS | Mod: CPTII,S$GLB,,

## 2023-11-07 PROCEDURE — 1159F MED LIST DOCD IN RCRD: CPT | Mod: CPTII,S$GLB,,

## 2023-11-07 PROCEDURE — 1101F PR PT FALLS ASSESS DOC 0-1 FALLS W/OUT INJ PAST YR: ICD-10-PCS | Mod: CPTII,S$GLB,,

## 2023-11-07 PROCEDURE — 1101F PT FALLS ASSESS-DOCD LE1/YR: CPT | Mod: CPTII,S$GLB,,

## 2023-11-07 PROCEDURE — 1160F RVW MEDS BY RX/DR IN RCRD: CPT | Mod: CPTII,S$GLB,,

## 2023-11-07 RX ORDER — METHYLPREDNISOLONE ACETATE 40 MG/ML
40 INJECTION, SUSPENSION INTRA-ARTICULAR; INTRALESIONAL; INTRAMUSCULAR; SOFT TISSUE
Status: DISCONTINUED | OUTPATIENT
Start: 2023-11-07 | End: 2023-11-07 | Stop reason: HOSPADM

## 2023-11-07 RX ADMIN — METHYLPREDNISOLONE ACETATE 40 MG: 40 INJECTION, SUSPENSION INTRA-ARTICULAR; INTRALESIONAL; INTRAMUSCULAR; SOFT TISSUE at 01:11

## 2023-11-07 NOTE — PROGRESS NOTES
SUBJECTIVE:    Patient ID: Erica Masterson is a 78 y.o. female.    Chief Complaint: Knee Pain (Knee injection)  INTERVAL HPI:   Erica Masterson is a 78 y.o. female who presents today as an established patient for management of right hip and leg pain. The patient presents today for steroid injection of her right knee. The patient localizes her pain to the lateral aspect of her right knee.  The patient is scheduled for SI joint injection in December. The patient reports she exercises 3 x a week and has recently restarted yoga.  The patient reports that her pain is a 3/10. Patient denies of any urinary/fecal incontinence, saddle anesthesia, or weakness.      HPI: ( PER DR. WALTON)  She presents today for follow-up having completed her course of physical therapy for neck and low back pain.  She does find physical therapy beneficial however she is still not satisfied with her quality of life.  Her primary complaint is of low back pain at the lumbosacral junction radiates into the posterior portion of the right leg and into the calf.  Secondary complaint of posterior neck discomfort.  She is also complaining of some mid to lower thoracic pain.  All of these are familiar symptoms.  Her pain level is 5/10    Pain Scales  Best:/10  Worst:  Usually:  Today: 3/10    Follow-up  This is a chronic problem. The problem has been gradually improving. Associated symptoms include myalgias, neck pain and numbness. Pertinent negatives include no abdominal pain, arthralgias, chest pain, chills, diaphoresis, fatigue, fever, headaches, joint swelling, nausea, vomiting or weakness.     Interventional Pain Procedures:   11/7/2023: Right knee steroid joint injection   7/20/2023: Interlaminar epidural steroid injection at L5-S1-80% relief.     Past Medical History:   Diagnosis Date    Adenomatous polyp of colon 3/26/2012    Allergy     Anxiety     Cancer     Cataract     Colon polyp     Degenerative arthritis of knee 04/03/2012     Diverticulosis     Encounter for blood transfusion     GERD (gastroesophageal reflux disease)     History of thyroid cancer 2021    Hyperlipidemia     Hypertension     Lateral meniscal tear 5/20/2013    Multinodular goiter 03/26/2012    Myopathy, unspecified 01/18/2010    Tuberculosis      Social History     Socioeconomic History    Marital status:    Tobacco Use    Smoking status: Never    Smokeless tobacco: Never   Substance and Sexual Activity    Alcohol use: Not Currently     Comment: stopped 2019    Drug use: No    Sexual activity: Not Currently     Social Determinants of Health     Financial Resource Strain: Low Risk  (1/19/2023)    Overall Financial Resource Strain (CARDIA)     Difficulty of Paying Living Expenses: Not hard at all   Food Insecurity: No Food Insecurity (1/19/2023)    Hunger Vital Sign     Worried About Running Out of Food in the Last Year: Never true     Ran Out of Food in the Last Year: Never true   Transportation Needs: No Transportation Needs (1/19/2023)    PRAPARE - Transportation     Lack of Transportation (Medical): No     Lack of Transportation (Non-Medical): No   Physical Activity: Sufficiently Active (1/19/2023)    Exercise Vital Sign     Days of Exercise per Week: 3 days     Minutes of Exercise per Session: 60 min   Stress: No Stress Concern Present (1/19/2023)    [employer] [place] [employer] - Occupational Stress Questionnaire     Feeling of Stress : Not at all   Social Connections: Moderately Integrated (1/19/2023)    Social Connection and Isolation Panel [NHANES]     Frequency of Communication with Friends and Family: Three times a week     Frequency of Social Gatherings with Friends and Family: Three times a week     Attends Sabianist Services: More than 4 times per year     Active Member of Clubs or Organizations: No     Attends Club or Organization Meetings: Never     Marital Status:    Housing Stability: Low Risk  (1/19/2023)    Housing Stability  Vital Sign     Unable to Pay for Housing in the Last Year: No     Number of Places Lived in the Last Year: 1     Unstable Housing in the Last Year: No     Past Surgical History:   Procedure Laterality Date    BREAST BIOPSY Left 2015    benign    CHOLECYSTECTOMY      COLONOSCOPY  12/02/2015    Dr. Britton, multiple polyps, recheck five years-three years if more than 2 polyps are adenomatous    COLONOSCOPY N/A 12/02/2015    COLONOSCOPY N/A 03/20/2019    Procedure: COLONOSCOPY;  Surgeon: Jose Britton MD;  Location: [facility];  Service: Endoscopy;  Laterality: N/A;    COLONOSCOPY N/A 05/20/2020    Dr. Britton; internal hemorrhoids; diverticulosis; polyps removed; repeat in 3 years    CYSTOSCOPY N/A 08/26/2020    Procedure: CYSTOSCOPY;  Surgeon: Charlotte Walters Jr., MD;  [facility];  Service: Urology;  Laterality: N/A;    DILATION AND CURETTAGE OF UTERUS      DISSECTION OF NECK Left 09/13/2021    Procedure: DISSECTION, NECK;  Surgeon: Ryan Torres MD;  Location: [facility] 2ND FLR;  Service: ENT;  Laterality: Left;    EPIDURAL STEROID INJECTION INTO LUMBAR SPINE N/A 7/20/2023    Procedure: Injection-steroid-epidural-lumbar;  Surgeon: Julián Chiang MD;  Location: [facility] OR;  Service: Pain Management;  Laterality: N/A;  L5-s1    ESOPHAGEAL MANOMETRY WITH MEASUREMENT OF IMPEDANCE N/A 08/22/2022    Procedure: MANOMETRY, ESOPHAGUS, WITH IMPEDANCE MEASUREMENT;  Surgeon: Pantera Mcguire MD;  Location: [facility] (4TH FLR);  Service: Endoscopy;  Laterality: N/A;  8/4 fully vaccinated; instructions mailed-st    ESOPHAGOGASTRODUODENOSCOPY N/A 05/20/2020    Dr. Britton; small hiatal hernia; gastritis; 8 gastric polyps removed    ESOPHAGOGASTRODUODENOSCOPY N/A 04/01/2022    Procedure: EGD (ESOPHAGOGASTRODUODENOSCOPY);  Surgeon: Jose Britton MD;  Location: [facility];  Service: Endoscopy;  Laterality: N/A;    FOOT SURGERY      HYSTERECTOMY      TVH/BSO > 20 years    INCISION OF VULVA Bilateral 8/4/2023    Procedure:  EXCISION, CYST, LABIA AND OTHER INDICATED PROCEDURES;  Surgeon: Mat Beach MD;  [facility];  Service: OB/GYN;  Laterality: Bilateral;  EXCYSION OF VULVAR CYST    INTRALUMINAL GASTROINTESTINAL TRACT IMAGING VIA CAPSULE N/A 06/04/2020    Procedure: IMAGING PROCEDURE, GI TRACT, INTRALUMINAL, VIA CAPSULE;  Surgeon: Jose Britton MD;  Location: [facility] ENDO;  Service: Endoscopy;  Laterality: N/A;    KNEE SURGERY  06/06/2013    right knee tear Dr Iverson     LAPAROSCOPIC CHOLECYSTECTOMY N/A 09/17/2020    Procedure: CHOLECYSTECTOMY, LAPAROSCOPIC;  Surgeon: Forrest Veronica MD;  Location: [facility] OR;  Service: General;  Laterality: N/A;    LUNG LOBECTOMY  1966    right middle lobectomy, due to Tb    OOPHORECTOMY      SHOULDER SURGERY      francis     THYROIDECTOMY Bilateral 05/19/2021    Procedure: THYROIDECTOMY;  Surgeon: Jamia Amezquita MD;  [facility];  Service: General;  Laterality: Bilateral;    THYROIDECTOMY Bilateral 09/13/2021    Procedure: THYROIDECTOMY WITH INTRA-OP PTH;  Surgeon: Ryan Torres MD;  Location: [facility] OR [facility] FLR;  Service: ENT;  Laterality: Bilateral;  NIM tube  Intraop PTH     Family History   Problem Relation Age of Onset    Colon cancer Other     Cancer Mother     Hypertension Mother     Hyperlipidemia Mother     Cancer Brother     Hypertension Father     Emphysema Father     Diabetes Son     Hypertension Son     Alcohol abuse Son     Diabetes Maternal Aunt     Alzheimer's disease Maternal Uncle     Cancer Maternal Grandmother     No Known Problems Daughter     Dementia Sister     Alzheimer's disease Sister     Breast cancer Sister 60    Obesity Paternal Uncle     Prostate cancer Other     Cancer Brother     Melanoma Neg Hx     Psoriasis Neg Hx     Lupus Neg Hx     Eczema Neg Hx     Amblyopia Neg Hx     Blindness Neg Hx     Cataracts Neg Hx     Glaucoma Neg Hx     Macular degeneration Neg Hx     Retinal detachment Neg Hx     Strabismus Neg Hx     Stroke Neg Hx      "Thyroid cancer Neg Hx     Colon polyps Neg Hx     Ulcerative colitis Neg Hx     Stomach cancer Neg Hx     Rectal cancer Neg Hx      Vitals:    11/07/23 1255   BP: 138/76   Pulse: 74   Weight: 74.8 kg (165 lb)   Height: 5' 6" (1.676 m)         Review of Systems   Constitutional:  Negative for chills, diaphoresis, fatigue, fever and unexpected weight change.   HENT:  Negative for trouble swallowing.    Eyes:  Negative for visual disturbance.   Respiratory:  Negative for shortness of breath.    Cardiovascular:  Negative for chest pain.   Gastrointestinal:  Negative for abdominal pain, constipation, nausea and vomiting.   Genitourinary:  Negative for difficulty urinating.   Musculoskeletal:  Positive for back pain, gait problem, myalgias and neck pain. Negative for arthralgias, joint swelling and neck stiffness.   Neurological:  Positive for numbness. Negative for dizziness, speech difficulty, weakness, light-headedness and headaches.   All other systems reviewed and are negative.         Objective:      Physical Exam  Vitals and nursing note reviewed.   Musculoskeletal:         General: Tenderness present.   Skin:     General: Skin is warm and dry.   Neurological:      Mental Status: She is alert and oriented to person, place, and time.   Psychiatric:         Mood and Affect: Mood normal.         LUMBAR SPINE  Lumbar spine: ROM is limited with flexion extension and oblique extension with increased pain with extension.     ((+)) Supine straight leg raise left side   ((-)) Facet loading   Internal and external rotation of the hip causes no increased pain .  Myofascial exam: Tenderness to palpation across lumbar parapsinous processes      (+) TTP at the SI joint to the right  ((+)) JOSE's test  (+)) One leg stand    ((+)) Distraction test  ((+)) Thigh thrust     Knee exam:    Extension/flexion equal bilaterally.   + crepitus with motion right knees.    - effusion both knees.    + joint line tenderness of lateral aspect " of right knee     IMAGING:   10/31/2023: XRAY OF RIGHT KNEE FINDINGS:  No acute fracture or malalignment.  Severe degenerative change lateral compartment right knee and minimal degenerative change elsewhere.  Trace right knee joint fluid.       Assessment:     Erica Masterson is  78 y.o. female who presents today as an established patient.  The patient does have some reproducible pain with SI Joint provocation to the right side.    1. Osteoarthritis of right knee, unspecified osteoarthritis type    2. Acute pain of right knee                 Plan:     - Reviewed pertinent imaging and records with patient   - continue to monitor progress of Interlaminar DWAYNE at L5-S1   -  right knee steroid injection provided in office today.   - recommended compression knee brace for right knee when standing or ambulating for long periods of time.   - Recommended RICE of right knee  - keep scheduled procedure appt    - F/U s/p SI joint injection   Sania Stephen, VIKA       Osteoarthritis of right knee, unspecified osteoarthritis type    Acute pain of right knee

## 2023-11-07 NOTE — PROCEDURES
Large Joint Aspiration/Injection: R knee    Date/Time: 11/7/2023 1:00 PM    Performed by: Sania Stephen NP  Authorized by: Sania Stephen NP    Consent Done?:  Yes (Written)  Indications:  Arthritis  Site marked: the procedure site was marked    Timeout: prior to procedure the correct patient, procedure, and site was verified    Prep: patient was prepped and draped in usual sterile fashion      Local anesthesia used?: Yes    Anesthesia:  Local infiltration  Local anesthetic:  Bupivacaine 0.25% without epinephrine  Anesthetic total (ml):  4      Details:  Needle Size:  25 G  Ultrasonic Guidance for needle placement?: No    Approach:  Lateral  Location:  Knee  Site:  R knee  Medications:  40 mg methylPREDNISolone acetate 40 mg/mL  Patient tolerance:  Patient tolerated the procedure well with no immediate complications

## 2023-11-07 NOTE — H&P (VIEW-ONLY)
SUBJECTIVE:    Patient ID: Erica Masterson is a 78 y.o. female.    Chief Complaint: Knee Pain (Knee injection)  INTERVAL HPI:   Erica Masterson is a 78 y.o. female who presents today as an established patient for management of right hip and leg pain. The patient presents today for steroid injection of her right knee. The patient localizes her pain to the lateral aspect of her right knee.  The patient is scheduled for SI joint injection in December. The patient reports she exercises 3 x a week and has recently restarted yoga.  The patient reports that her pain is a 3/10. Patient denies of any urinary/fecal incontinence, saddle anesthesia, or weakness.      HPI: ( PER DR. WALTON)  She presents today for follow-up having completed her course of physical therapy for neck and low back pain.  She does find physical therapy beneficial however she is still not satisfied with her quality of life.  Her primary complaint is of low back pain at the lumbosacral junction radiates into the posterior portion of the right leg and into the calf.  Secondary complaint of posterior neck discomfort.  She is also complaining of some mid to lower thoracic pain.  All of these are familiar symptoms.  Her pain level is 5/10    Pain Scales  Best:/10  Worst:  Usually:  Today: 3/10    Follow-up  This is a chronic problem. The problem has been gradually improving. Associated symptoms include myalgias, neck pain and numbness. Pertinent negatives include no abdominal pain, arthralgias, chest pain, chills, diaphoresis, fatigue, fever, headaches, joint swelling, nausea, vomiting or weakness.     Interventional Pain Procedures:   11/7/2023: Right knee steroid joint injection   7/20/2023: Interlaminar epidural steroid injection at L5-S1-80% relief.     Past Medical History:   Diagnosis Date    Adenomatous polyp of colon 3/26/2012    Allergy     Anxiety     Cancer     Cataract     Colon polyp     Degenerative arthritis of knee 04/03/2012     Diverticulosis     Encounter for blood transfusion     GERD (gastroesophageal reflux disease)     History of thyroid cancer 2021    Hyperlipidemia     Hypertension     Lateral meniscal tear 5/20/2013    Multinodular goiter 03/26/2012    Myopathy, unspecified 01/18/2010    Tuberculosis      Social History     Socioeconomic History    Marital status:    Tobacco Use    Smoking status: Never    Smokeless tobacco: Never   Substance and Sexual Activity    Alcohol use: Not Currently     Comment: stopped 2019    Drug use: No    Sexual activity: Not Currently     Social Determinants of Health     Financial Resource Strain: Low Risk  (1/19/2023)    Overall Financial Resource Strain (CARDIA)     Difficulty of Paying Living Expenses: Not hard at all   Food Insecurity: No Food Insecurity (1/19/2023)    Hunger Vital Sign     Worried About Running Out of Food in the Last Year: Never true     Ran Out of Food in the Last Year: Never true   Transportation Needs: No Transportation Needs (1/19/2023)    PRAPARE - Transportation     Lack of Transportation (Medical): No     Lack of Transportation (Non-Medical): No   Physical Activity: Sufficiently Active (1/19/2023)    Exercise Vital Sign     Days of Exercise per Week: 3 days     Minutes of Exercise per Session: 60 min   Stress: No Stress Concern Present (1/19/2023)    [employer] [place] [employer] - Occupational Stress Questionnaire     Feeling of Stress : Not at all   Social Connections: Moderately Integrated (1/19/2023)    Social Connection and Isolation Panel [NHANES]     Frequency of Communication with Friends and Family: Three times a week     Frequency of Social Gatherings with Friends and Family: Three times a week     Attends Congregational Services: More than 4 times per year     Active Member of Clubs or Organizations: No     Attends Club or Organization Meetings: Never     Marital Status:    Housing Stability: Low Risk  (1/19/2023)    Housing Stability  Vital Sign     Unable to Pay for Housing in the Last Year: No     Number of Places Lived in the Last Year: 1     Unstable Housing in the Last Year: No     Past Surgical History:   Procedure Laterality Date    BREAST BIOPSY Left 2015    benign    CHOLECYSTECTOMY      COLONOSCOPY  12/02/2015    Dr. Britton, multiple polyps, recheck five years-three years if more than 2 polyps are adenomatous    COLONOSCOPY N/A 12/02/2015    COLONOSCOPY N/A 03/20/2019    Procedure: COLONOSCOPY;  Surgeon: Jose Britton MD;  Location: [facility];  Service: Endoscopy;  Laterality: N/A;    COLONOSCOPY N/A 05/20/2020    Dr. Britton; internal hemorrhoids; diverticulosis; polyps removed; repeat in 3 years    CYSTOSCOPY N/A 08/26/2020    Procedure: CYSTOSCOPY;  Surgeon: Charlotte Walters Jr., MD;  Location: [facility];  Service: Urology;  Laterality: N/A;    DILATION AND CURETTAGE OF UTERUS      DISSECTION OF NECK Left 09/13/2021    Procedure: DISSECTION, NECK;  Surgeon: Ryan Torres MD;  Location: [facility] 2ND FLR;  Service: ENT;  Laterality: Left;    EPIDURAL STEROID INJECTION INTO LUMBAR SPINE N/A 7/20/2023    Procedure: Injection-steroid-epidural-lumbar;  Surgeon: Julián Chiang MD;  Location: [facility] OR;  Service: Pain Management;  Laterality: N/A;  L5-s1    ESOPHAGEAL MANOMETRY WITH MEASUREMENT OF IMPEDANCE N/A 08/22/2022    Procedure: MANOMETRY, ESOPHAGUS, WITH IMPEDANCE MEASUREMENT;  Surgeon: Pantera Mcguire MD;  Location: [facility] (4TH FLR);  Service: Endoscopy;  Laterality: N/A;  8/4 fully vaccinated; instructions mailed-st    ESOPHAGOGASTRODUODENOSCOPY N/A 05/20/2020    Dr. Britton; small hiatal hernia; gastritis; 8 gastric polyps removed    ESOPHAGOGASTRODUODENOSCOPY N/A 04/01/2022    Procedure: EGD (ESOPHAGOGASTRODUODENOSCOPY);  Surgeon: Jose Britton MD;  Location: [facility];  Service: Endoscopy;  Laterality: N/A;    FOOT SURGERY      HYSTERECTOMY      TVH/BSO > 20 years    INCISION OF VULVA Bilateral 8/4/2023    Procedure:  EXCISION, CYST, LABIA AND OTHER INDICATED PROCEDURES;  Surgeon: Mat Beach MD;  Location: [facility] OR;  Service: OB/GYN;  Laterality: Bilateral;  EXCYSION OF VULVAR CYST    INTRALUMINAL GASTROINTESTINAL TRACT IMAGING VIA CAPSULE N/A 06/04/2020    Procedure: IMAGING PROCEDURE, GI TRACT, INTRALUMINAL, VIA CAPSULE;  Surgeon: Jose Britton MD;  Location: [facility] ENDO;  Service: Endoscopy;  Laterality: N/A;    KNEE SURGERY  06/06/2013    right knee tear Dr Iverson     LAPAROSCOPIC CHOLECYSTECTOMY N/A 09/17/2020    Procedure: CHOLECYSTECTOMY, LAPAROSCOPIC;  Surgeon: Forrest Veronica MD;  Location: [facility] OR;  Service: General;  Laterality: N/A;    LUNG LOBECTOMY  1966    right middle lobectomy, due to Tb    OOPHORECTOMY      SHOULDER SURGERY      francis     THYROIDECTOMY Bilateral 05/19/2021    Procedure: THYROIDECTOMY;  Surgeon: Jamia Amezquita MD;  [facility];  Service: General;  Laterality: Bilateral;    THYROIDECTOMY Bilateral 09/13/2021    Procedure: THYROIDECTOMY WITH INTRA-OP PTH;  Surgeon: Ryan Torres MD;  Location: [facility] OR [facility] F[facility];  Service: ENT;  Laterality: Bilateral;  NIM tube  Intraop PTH     Family History   Problem Relation Age of Onset    Colon cancer Other     Cancer Mother     Hypertension Mother     Hyperlipidemia Mother     Cancer Brother     Hypertension Father     Emphysema Father     Diabetes Son     Hypertension Son     Alcohol abuse Son     Diabetes Maternal Aunt     Alzheimer's disease Maternal Uncle     Cancer Maternal Grandmother     No Known Problems Daughter     Dementia Sister     Alzheimer's disease Sister     Breast cancer Sister 60    Obesity Paternal Uncle     Prostate cancer Other     Cancer Brother     Melanoma Neg Hx     Psoriasis Neg Hx     Lupus Neg Hx     Eczema Neg Hx     Amblyopia Neg Hx     Blindness Neg Hx     Cataracts Neg Hx     Glaucoma Neg Hx     Macular degeneration Neg Hx     Retinal detachment Neg Hx     Strabismus Neg Hx     Stroke Neg Hx      "Thyroid cancer Neg Hx     Colon polyps Neg Hx     Ulcerative colitis Neg Hx     Stomach cancer Neg Hx     Rectal cancer Neg Hx      Vitals:    11/07/23 1255   BP: 138/76   Pulse: 74   Weight: 74.8 kg (165 lb)   Height: 5' 6" (1.676 m)         Review of Systems   Constitutional:  Negative for chills, diaphoresis, fatigue, fever and unexpected weight change.   HENT:  Negative for trouble swallowing.    Eyes:  Negative for visual disturbance.   Respiratory:  Negative for shortness of breath.    Cardiovascular:  Negative for chest pain.   Gastrointestinal:  Negative for abdominal pain, constipation, nausea and vomiting.   Genitourinary:  Negative for difficulty urinating.   Musculoskeletal:  Positive for back pain, gait problem, myalgias and neck pain. Negative for arthralgias, joint swelling and neck stiffness.   Neurological:  Positive for numbness. Negative for dizziness, speech difficulty, weakness, light-headedness and headaches.   All other systems reviewed and are negative.         Objective:      Physical Exam  Vitals and nursing note reviewed.   Musculoskeletal:         General: Tenderness present.   Skin:     General: Skin is warm and dry.   Neurological:      Mental Status: She is alert and oriented to person, place, and time.   Psychiatric:         Mood and Affect: Mood normal.         LUMBAR SPINE  Lumbar spine: ROM is limited with flexion extension and oblique extension with increased pain with extension.     ((+)) Supine straight leg raise left side   ((-)) Facet loading   Internal and external rotation of the hip causes no increased pain .  Myofascial exam: Tenderness to palpation across lumbar parapsinous processes      (+) TTP at the SI joint to the right  ((+)) JOSE's test  (+)) One leg stand    ((+)) Distraction test  ((+)) Thigh thrust     Knee exam:    Extension/flexion equal bilaterally.   + crepitus with motion right knees.    - effusion both knees.    + joint line tenderness of lateral aspect " of right knee     IMAGING:   10/31/2023: XRAY OF RIGHT KNEE FINDINGS:  No acute fracture or malalignment.  Severe degenerative change lateral compartment right knee and minimal degenerative change elsewhere.  Trace right knee joint fluid.       Assessment:     Erica Masterson is  78 y.o. female who presents today as an established patient.  The patient does have some reproducible pain with SI Joint provocation to the right side.    1. Osteoarthritis of right knee, unspecified osteoarthritis type    2. Acute pain of right knee                 Plan:     - Reviewed pertinent imaging and records with patient   - continue to monitor progress of Interlaminar DWAYNE at L5-S1   -  right knee steroid injection provided in office today.   - recommended compression knee brace for right knee when standing or ambulating for long periods of time.   - Recommended RICE of right knee  - keep scheduled procedure appt    - F/U s/p SI joint injection   Sania Stephen, VIKA       Osteoarthritis of right knee, unspecified osteoarthritis type    Acute pain of right knee

## 2023-11-08 DIAGNOSIS — C73 THYROID CANCER: Primary | ICD-10-CM

## 2023-11-14 ENCOUNTER — TELEPHONE (OUTPATIENT)
Dept: FAMILY MEDICINE | Facility: CLINIC | Age: 78
End: 2023-11-14
Payer: MEDICARE

## 2023-11-14 NOTE — TELEPHONE ENCOUNTER
Patient given thyroid u/s results. She was given [phone] to call to schedule diag mammo and u/s.

## 2023-11-14 NOTE — TELEPHONE ENCOUNTER
----- Message from Rhoda Cruz sent at 11/14/2023  2:34 PM CST -----  Regarding: advice  Contact: patient  Type: Needs Medical Advice  Who Called:  patient  Symptoms (please be specific):    How long has patient had these symptoms:    Pharmacy name and phone #:    Best Call Back Number: [phone] (home)     Additional Information: Patient would like to know when she needs her nest mammogram.  Patient would also like to get results of her thyroid ultrasound.  Please call patient to advise. Thanks!

## 2023-11-16 ENCOUNTER — HOSPITAL ENCOUNTER (OUTPATIENT)
Facility: HOSPITAL | Age: 78
Discharge: HOME | End: 2023-11-16
Attending: INTERNAL MEDICINE | Admitting: INTERNAL MEDICINE
Payer: MEDICARE

## 2023-11-16 ENCOUNTER — ANESTHESIA (OUTPATIENT)
Dept: ENDOSCOPY | Facility: HOSPITAL | Age: 78
End: 2023-11-16
Payer: MEDICARE

## 2023-11-16 ENCOUNTER — ANESTHESIA EVENT (OUTPATIENT)
Dept: ENDOSCOPY | Facility: HOSPITAL | Age: 78
End: 2023-11-16
Payer: MEDICARE

## 2023-11-16 DIAGNOSIS — Z86.010 HISTORY OF COLON POLYPS: ICD-10-CM

## 2023-11-16 DIAGNOSIS — K64.8 INTERNAL HEMORRHOIDS: Primary | ICD-10-CM

## 2023-11-16 DIAGNOSIS — K63.5 POLYP OF COLON, UNSPECIFIED PART OF COLON, UNSPECIFIED TYPE: ICD-10-CM

## 2023-11-16 PROCEDURE — 45385 COLONOSCOPY W/LESION REMOVAL: CPT | Mod: PT,,, | Performed by: INTERNAL MEDICINE

## 2023-11-16 PROCEDURE — 25000003 PHARM REV CODE 250: Performed by: NURSE ANESTHETIST, CERTIFIED REGISTERED

## 2023-11-16 PROCEDURE — 27201089 HC SNARE, DISP (ANY): Performed by: INTERNAL MEDICINE

## 2023-11-16 PROCEDURE — 45385 COLONOSCOPY W/LESION REMOVAL: CPT | Mod: PT | Performed by: INTERNAL MEDICINE

## 2023-11-16 PROCEDURE — 37000009 HC ANESTHESIA EA ADD 15 MINS: Performed by: INTERNAL MEDICINE

## 2023-11-16 PROCEDURE — 25000003 PHARM REV CODE 250: Performed by: INTERNAL MEDICINE

## 2023-11-16 PROCEDURE — D9220A PRA ANESTHESIA: ICD-10-PCS | Mod: PT,CRNA,, | Performed by: NURSE ANESTHETIST, CERTIFIED REGISTERED

## 2023-11-16 PROCEDURE — 45385 PR COLONOSCOPY,REMV LESN,SNARE: ICD-10-PCS | Mod: PT,,, | Performed by: INTERNAL MEDICINE

## 2023-11-16 PROCEDURE — 37000008 HC ANESTHESIA 1ST 15 MINUTES: Performed by: INTERNAL MEDICINE

## 2023-11-16 PROCEDURE — 88305 TISSUE EXAM BY PATHOLOGIST: CPT | Mod: TC | Performed by: PATHOLOGY

## 2023-11-16 PROCEDURE — D9220A PRA ANESTHESIA: Mod: PT,CRNA,, | Performed by: NURSE ANESTHETIST, CERTIFIED REGISTERED

## 2023-11-16 PROCEDURE — D9220A PRA ANESTHESIA: ICD-10-PCS | Mod: PT,ANES,, | Performed by: ANESTHESIOLOGY

## 2023-11-16 PROCEDURE — 63600175 PHARM REV CODE 636 W HCPCS: Performed by: NURSE ANESTHETIST, CERTIFIED REGISTERED

## 2023-11-16 PROCEDURE — D9220A PRA ANESTHESIA: Mod: PT,ANES,, | Performed by: ANESTHESIOLOGY

## 2023-11-16 RX ORDER — LIDOCAINE HYDROCHLORIDE 20 MG/ML
INJECTION INTRAVENOUS
Status: DISCONTINUED | OUTPATIENT
Start: 2023-11-16 | End: 2023-11-16

## 2023-11-16 RX ORDER — PROPOFOL 10 MG/ML
VIAL (ML) INTRAVENOUS
Status: DISCONTINUED | OUTPATIENT
Start: 2023-11-16 | End: 2023-11-16

## 2023-11-16 RX ORDER — SODIUM CHLORIDE 9 MG/ML
INJECTION, SOLUTION INTRAVENOUS CONTINUOUS
Status: DISCONTINUED | OUTPATIENT
Start: 2023-11-16 | End: 2023-11-16 | Stop reason: HOSPADM

## 2023-11-16 RX ADMIN — SODIUM CHLORIDE: 9 INJECTION, SOLUTION INTRAVENOUS at 08:11

## 2023-11-16 RX ADMIN — PROPOFOL 50 MG: 10 INJECTION, EMULSION INTRAVENOUS at 09:11

## 2023-11-16 RX ADMIN — PROPOFOL 150 MG: 10 INJECTION, EMULSION INTRAVENOUS at 09:11

## 2023-11-16 RX ADMIN — LIDOCAINE HYDROCHLORIDE 100 MG: 20 INJECTION, SOLUTION INTRAVENOUS at 09:11

## 2023-11-16 NOTE — TRANSFER OF CARE
Anesthesia Transfer of Care Note    Patient: Erica Masterson    Procedure(s) Performed: Procedure(s) (LRB):  COLONOSCOPY(instructions mailed 11/9) (N/A)    Patient location: GI    Anesthesia Type: general    Transport from OR: Transported from OR on room air with adequate spontaneous ventilation    Post pain: adequate analgesia    Post assessment: no apparent anesthetic complications and tolerated procedure well    Post vital signs: stable    Level of consciousness: awake, alert and oriented    Nausea/Vomiting: no nausea/vomiting    Complications: none    Transfer of care protocol was followed    Last vitals: Visit Vitals  BP (!) 148/64 (BP Location: Left arm, Patient Position: Lying)   Pulse 71   Temp 36.6 °C (97.9 °F) (Skin)   Resp 18   Wt 72.6 kg (160 lb)   SpO2 97%   Breastfeeding No   BMI 25.82 kg/m²

## 2023-11-16 NOTE — ANESTHESIA POSTPROCEDURE EVALUATION
Anesthesia Post Evaluation    Patient: Erica Masterson    Procedure(s) Performed: Procedure(s) (LRB):  COLONOSCOPY(instructions mailed 11/9) (N/A)    Final Anesthesia Type: general      Patient location during evaluation: PACU  Patient participation: Yes- Able to Participate  Level of consciousness: awake and alert and oriented  Post-procedure vital signs: reviewed and stable  Pain management: adequate  Airway patency: patent    PONV status at discharge: No PONV  Anesthetic complications: no      Cardiovascular status: blood pressure returned to baseline  Respiratory status: unassisted, spontaneous ventilation and room air  Hydration status: euvolemic  Follow-up not needed.          Vitals Value Taken Time   /58 11/16/23 1032   Temp 36.6 °C (97.9 °F) 11/16/23 1002   Pulse 67 11/16/23 1032   Resp 18 11/16/23 1032   SpO2 99 % 11/16/23 1032         No case tracking events are documented in the log.      Pain/Marco Antonio Score: Marco Antonio Score: 10 (11/16/2023 10:32 AM)

## 2023-11-16 NOTE — ANESTHESIA PREPROCEDURE EVALUATION
11/16/2023  Erica Masterson is a 78 y.o., female.      Pre-op Assessment    I have reviewed the NPO Status.   I have reviewed the Medications.     Review of Systems  Anesthesia Hx:  No problems with previous Anesthesia                Cardiovascular:  Exercise tolerance: good   Hypertension                                        Renal/:  Chronic Renal Disease                Hepatic/GI:  Bowel Prep.   GERD             Musculoskeletal:  Arthritis               Neurological:    Neuromuscular Disease,                                   Endocrine:  Diabetes, type 2 Hypothyroidism          Psych:  Psychiatric History                  Physical Exam  General: Well nourished        Anesthesia Plan  Type of Anesthesia, risks & benefits discussed:    Anesthesia Type: Gen Natural Airway  Intra-op Monitoring Plan: Standard ASA Monitors  Induction:  IV  Informed Consent: Informed consent signed with the Patient and all parties understand the risks and agree with anesthesia plan.  All questions answered.   ASA Score: 3    Ready For Surgery From Anesthesia Perspective.     .

## 2023-11-16 NOTE — [employer]OVATION PATIENT INSTRUCTIONS
Discharge Summary/Instructions after an Endoscopic Procedure  Patient Name: Erica Masterson  Patient MRN: [id number]  Patient YOB: 1945  Thursday, November 16, 2023  Jose Coughlin MD  Dear patient,  As a result of recent federal legislation (The Federal Cures Act), you may   receive lab or pathology results from your procedure in your [facility]   account before your physician is able to contact you. Your physician or   their representative will relay the results to you with their   recommendations at their soonest availability.  Thank you,  RESTRICTIONS:  During your procedure today, you received medications for sedation.  These   medications may affect your judgment, balance and coordination.  Therefore,   for 24 hours, you have the following restrictions:   - DO NOT drive a car, operate machinery, make legal/financial decisions,   sign important papers or drink alcohol.    ACTIVITY:  Today: no heavy lifting, straining or running due to procedural   sedation/anesthesia.  The following day: return to full activity including work.  DIET:  Eat and drink normally unless instructed otherwise.     TREATMENT FOR COMMON SIDE EFFECTS:  - Mild abdominal pain, nausea, belching, bloating or excessive gas:  rest,   eat lightly and use a heating pad.  - Sore Throat: treat with throat lozenges and/or gargle with warm salt   water.  - Because air was used during the procedure, expelling large amounts of air   from your rectum or belching is normal.  - If a bowel prep was taken, you may not have a bowel movement for 1-3 days.    This is normal.  SYMPTOMS TO WATCH FOR AND REPORT TO YOUR PHYSICIAN:  1. Abdominal pain or bloating, other than gas cramps.  2. Chest pain.  3. Back pain.  4. Signs of infection such as: chills or fever occurring within 24 hours   after the procedure.  5. Rectal bleeding, which would show as bright red, maroon, or black stools.   (A tablespoon of blood from the rectum is not serious, especially  if   hemorrhoids are present.)  6. Vomiting.  7. Weakness or dizziness.  GO DIRECTLY TO THE NEAREST EMERGENCY ROOM IF YOU HAVE ANY OF THE FOLLOWING:      Difficulty breathing              Chills and/or fever over 101 F   Persistent vomiting and/or vomiting blood   Severe abdominal pain   Severe chest pain   Black, tarry stools   Bleeding- more than one tablespoon   Any other symptom or condition that you feel may need urgent attention  Your doctor recommends these additional instructions:  If any biopsies were taken, your doctors clinic will contact you in 1 to 2   weeks with any results.  - Patient has a contact number available for emergencies.  The signs and   symptoms of potential delayed complications were discussed with the   patient.  Return to normal activities tomorrow.  Written discharge   instructions were provided to the patient.   - High fiber diet.   - Continue present medications.   - Await pathology results.   - Repeat colonoscopy in 5 years for surveillance.   - Discharge patient to home (ambulatory).   - Return to GI office PRN.  For questions, problems or results please call your physician - Jose Coughlin MD at Work:  [phone].  [facility] PHONE NUMBER: [phone]  IF A COMPLICATION OR EMERGENCY SITUATION ARISES AND YOU ARE UNABLE TO REACH   YOUR PHYSICIAN - GO DIRECTLY TO THE EMERGENCY ROOM.  Jose Coughlin MD  11/16/2023 9:59:26 AM  This report has been verified and signed electronically.  Dear patient,  As a result of recent federal legislation (The Federal Cures Act), you may   receive lab or pathology results from your procedure in your [facility]   account before your physician is able to contact you. Your physician or   their representative will relay the results to you with their   recommendations at their soonest availability.  Thank you,  [employer]ATION

## 2023-11-16 NOTE — H&P
CC: History of colon polyps    78 year old female with above. States that symptoms are absent, no alleviating/exacerbating factors. No family history of colorectal CA. Positive personal history of polyps. No bleeding or weight loss.     ROS:  No headache, no fever/chills, no chest pain/SOB, no nausea/vomiting/diarrhea/constipation/GI bleeding/abdominal pain, no dysuria/hematuria.    VSSAF   Exam:   Alert and oriented x 3; no apparent distress   PERRLA, sclera anicteric  CV: Regular rate/rhythm, normal PMI   Lungs: Clear bilaterally with no wheeze/rales   Abdomen: Soft, NT/ND, normal bowel sounds   Ext: No cyanosis, clubbing     Impression:   As above    Plan:   Proceed with endoscopy. Further recs to follow.

## 2023-11-16 NOTE — PLAN OF CARE
Has met unit/department guidelines for discharge from each phase of the post procedure continuum. Leaving floor per w/c with staff. AAO x3. Resp even and unlabored room air. No distress noted. Tolerating Po fluids without c/o nausea/vomiting. All personal belongings returned to pt.

## 2023-11-17 ENCOUNTER — TELEPHONE (OUTPATIENT)
Dept: GASTROENTEROLOGY | Facility: CLINIC | Age: 78
End: 2023-11-17
Payer: MEDICARE

## 2023-11-17 VITALS
BODY MASS INDEX: 25.82 KG/M2 | OXYGEN SATURATION: 99 % | RESPIRATION RATE: 18 BRPM | TEMPERATURE: 98 F | SYSTOLIC BLOOD PRESSURE: 120 MMHG | DIASTOLIC BLOOD PRESSURE: 62 MMHG | WEIGHT: 160 LBS | HEART RATE: 67 BPM

## 2023-11-17 NOTE — TELEPHONE ENCOUNTER
----- Message from Janny Hoff sent at 11/17/2023  7:26 AM CST -----  Type: Needs Medical Advice  Who Called:  pt  Symptoms (please be specific):  pt said she need to speak to the nurse--after her procedure she passed warm gel and she called yesterday and she can't remember what she was told to do--said so far since yesterday she sis not pass anymore --please call and advise  Best Call Back Number: [phone]  Additional Information: thank you

## 2023-11-20 ENCOUNTER — LAB VISIT (OUTPATIENT)
Dept: LAB | Facility: HOSPITAL | Age: 78
End: 2023-11-20
Attending: PHYSICIAN ASSISTANT
Payer: MEDICARE

## 2023-11-20 DIAGNOSIS — C73 THYROID CANCER: ICD-10-CM

## 2023-11-20 PROCEDURE — 84432 ASSAY OF THYROGLOBULIN: CPT | Performed by: PHYSICIAN ASSISTANT

## 2023-11-20 PROCEDURE — 36415 COLL VENOUS BLD VENIPUNCTURE: CPT | Mod: PO | Performed by: PHYSICIAN ASSISTANT

## 2023-11-20 NOTE — TELEPHONE ENCOUNTER
"Returned call to patient concerning rectal bleeding that she believes may be associated with a steroid injection. Patient also spoke with pharmacy and was assured the injection doesn't cause rectal bleeding. Bleeding described at "speck" on tissue. Patient has appointment next Wednesday. Encouraged to keep appointment and contact clinic if bleeding amount increases or increases in frequency.   " Opt out

## 2023-11-21 DIAGNOSIS — E89.0 POST-SURGICAL HYPOTHYROIDISM: ICD-10-CM

## 2023-11-21 RX ORDER — LEVOTHYROXINE SODIUM 75 UG/1
75 TABLET ORAL
Qty: 90 TABLET | Refills: 3 | Status: SHIPPED | OUTPATIENT
Start: 2023-11-21

## 2023-11-21 NOTE — TELEPHONE ENCOUNTER
----- Message from Frances Pantoja sent at 11/21/2023  7:05 AM CST -----  Contact: self  Type:  Needs Medical Advice    Who Called: self  Symptoms (please be specific): pt needs a refll on  levothyroxine (SYNTHROID) 75 MCG tablet, pt would also like the results for her blood work from yesterday.  Would the patient rather a call back or a response via [facility]? call  Best Call Back Number: [phone] (home)     Additional Information: please advise and thank you.      [facility] [place] [address] [place] [address]  Phone: [phone] Fax: [phone]

## 2023-11-24 ENCOUNTER — TELEPHONE (OUTPATIENT)
Dept: ENDOCRINOLOGY | Facility: CLINIC | Age: 78
End: 2023-11-24
Payer: MEDICARE

## 2023-11-24 NOTE — TELEPHONE ENCOUNTER
----- Message from Willian Lara, Patient Care Assistant sent at 11/24/2023  8:48 AM CST -----  Contact: Pt  Type: Test Results    Who Called: Pt  Name of Test:  (labs, xray etc..) Labs  Date of Test: 11/20/23  Ordering Provider: Korin  Where the test performed: [facility]  Best Call Back Number: [phone]  Additional Info-Please Advise-Thank you~

## 2023-11-24 NOTE — TELEPHONE ENCOUNTER
Spoke with patient, gave her lab results and thyroid ultrasound results, patient verbalized understanding.

## 2023-11-27 LAB
CLINICAL BIOCHEMIST REVIEW: ABNORMAL
THYROGLOB SERPL-MCNC: 4.8 NG/ML

## 2023-11-28 ENCOUNTER — TELEPHONE (OUTPATIENT)
Dept: FAMILY MEDICINE | Facility: CLINIC | Age: 78
End: 2023-11-28
Payer: MEDICARE

## 2023-11-28 ENCOUNTER — OFFICE VISIT (OUTPATIENT)
Dept: FAMILY MEDICINE | Facility: CLINIC | Age: 78
End: 2023-11-28
Payer: MEDICARE

## 2023-11-28 VITALS
HEIGHT: 66 IN | TEMPERATURE: 98 F | BODY MASS INDEX: 26.4 KG/M2 | WEIGHT: 164.25 LBS | OXYGEN SATURATION: 100 % | HEART RATE: 82 BPM

## 2023-11-28 DIAGNOSIS — J30.9 ALLERGIC RHINITIS, UNSPECIFIED SEASONALITY, UNSPECIFIED TRIGGER: Primary | ICD-10-CM

## 2023-11-28 DIAGNOSIS — M79.10 MUSCLE TENSION PAIN: ICD-10-CM

## 2023-11-28 PROCEDURE — 99213 OFFICE O/P EST LOW 20 MIN: CPT | Mod: S$GLB,,,

## 2023-11-28 PROCEDURE — 99213 PR OFFICE/OUTPT VISIT, EST, LEVL III, 20-29 MIN: ICD-10-PCS | Mod: S$GLB,,,

## 2023-11-28 PROCEDURE — 1125F PR PAIN SEVERITY QUANTIFIED, PAIN PRESENT: ICD-10-PCS | Mod: CPTII,S$GLB,,

## 2023-11-28 PROCEDURE — 1160F PR REVIEW ALL MEDS BY PRESCRIBER/CLIN PHARMACIST DOCUMENTED: ICD-10-PCS | Mod: CPTII,S$GLB,,

## 2023-11-28 PROCEDURE — 3288F PR FALLS RISK ASSESSMENT DOCUMENTED: ICD-10-PCS | Mod: CPTII,S$GLB,,

## 2023-11-28 PROCEDURE — 99999 PR PBB SHADOW E&M-EST. PATIENT-LVL V: ICD-10-PCS | Mod: PBBFAC,,,

## 2023-11-28 PROCEDURE — 1159F PR MEDICATION LIST DOCUMENTED IN MEDICAL RECORD: ICD-10-PCS | Mod: CPTII,S$GLB,,

## 2023-11-28 PROCEDURE — 1101F PR PT FALLS ASSESS DOC 0-1 FALLS W/OUT INJ PAST YR: ICD-10-PCS | Mod: CPTII,S$GLB,,

## 2023-11-28 PROCEDURE — 99999 PR PBB SHADOW E&M-EST. PATIENT-LVL V: CPT | Mod: PBBFAC,,,

## 2023-11-28 PROCEDURE — 1125F AMNT PAIN NOTED PAIN PRSNT: CPT | Mod: CPTII,S$GLB,,

## 2023-11-28 PROCEDURE — 1159F MED LIST DOCD IN RCRD: CPT | Mod: CPTII,S$GLB,,

## 2023-11-28 PROCEDURE — 3288F FALL RISK ASSESSMENT DOCD: CPT | Mod: CPTII,S$GLB,,

## 2023-11-28 PROCEDURE — 1160F RVW MEDS BY RX/DR IN RCRD: CPT | Mod: CPTII,S$GLB,,

## 2023-11-28 PROCEDURE — 1101F PT FALLS ASSESS-DOCD LE1/YR: CPT | Mod: CPTII,S$GLB,,

## 2023-11-28 RX ORDER — FLUTICASONE PROPIONATE 50 MCG
1 SPRAY, SUSPENSION (ML) NASAL DAILY
Qty: 16 G | Refills: 5 | Status: SHIPPED | OUTPATIENT
Start: 2023-11-28

## 2023-11-28 RX ORDER — TIZANIDINE 2 MG/1
2 TABLET ORAL NIGHTLY PRN
Qty: 30 TABLET | Refills: 0 | Status: SHIPPED | OUTPATIENT
Start: 2023-11-28 | End: 2024-02-05

## 2023-11-28 RX ORDER — LORATADINE 10 MG/1
10 TABLET ORAL DAILY
Qty: 30 TABLET | Refills: 5 | Status: SHIPPED | OUTPATIENT
Start: 2023-11-28 | End: 2024-11-27

## 2023-11-28 NOTE — TELEPHONE ENCOUNTER
----- Message from Tim Mckeon sent at 11/28/2023  7:11 AM CST -----  Type: Needs Medical Advice  Who Called:  Patient  Symptoms (please be specific):  Cough, congestion, eyes irritated  How long has patient had these symptoms:  Over a week    Pharmacy name and phone #:    [id number] [place] - [address]  [place] [address]  Phone: [phone] Fax: [phone]      Best Call Back Number:  [phone]  Additional Information: Please call to advise

## 2023-11-28 NOTE — PROGRESS NOTES
Subjective:       Patient ID: Erica Masterson is a 78 y.o. female.    Chief Complaint: Nasal Congestion      Erica Masterson is a 78 y.o. female with history of HTN, HLP, GERD who presents to clinic for evaluation of intermittent nasal congestion x several months. Reports nasal congestion is worse at night. Associated symptoms include clear eye drainage and runny nose. Has been using Flonase and OTC cough and cold syrup daily. Reports slight improvement with use of Flonase x 1 week.     Additionally reports neck muscle tension on the left. Notes she has had tension in this area for several months. States she has been to PT for this on three separate occasions. Has previously taken Tylenol for this but notes she has run out.         Review of Systems   Constitutional:  Negative for appetite change, chills, fatigue and fever.   HENT:  Positive for congestion, postnasal drip and rhinorrhea.    Eyes:  Positive for discharge (clear).   Respiratory:  Negative for cough, shortness of breath and wheezing.    Cardiovascular:  Negative for chest pain, palpitations and leg swelling.   Gastrointestinal:  Negative for abdominal pain, constipation, diarrhea and nausea.   Skin:  Negative for rash.   Neurological:  Negative for dizziness, light-headedness and headaches.         Past Medical History:   Diagnosis Date    Adenomatous polyp of colon 3/26/2012    Allergy     Anxiety     Cancer     Cataract     Colon polyp     Degenerative arthritis of knee 04/03/2012    Diverticulosis     Encounter for blood transfusion     GERD (gastroesophageal reflux disease)     History of thyroid cancer 2021    Hyperlipidemia     Hypertension     Lateral meniscal tear 5/20/2013    Multinodular goiter 03/26/2012    Myopathy, unspecified 01/18/2010    Tuberculosis        Review of patient's allergies indicates:  No Known Allergies      Current Outpatient Medications:     amLODIPine (NORVASC) 2.5 MG tablet, Take 1 tablet by mouth once daily, Disp: 90  tablet, Rfl: 0    artificial tears (ISOPTO TEARS) 0.5 % ophthalmic solution, 1 drop as needed., Disp: , Rfl:     blood sugar diagnostic (BLOOD GLUCOSE TEST) Strp, True Metrix glucose strips check once daily, Disp: 100 each, Rfl: 3    conjugated estrogens (PREMARIN) vaginal cream, Place 0.5 g vaginally once daily AND 0.5 g twice a week., Disp: 30 g, Rfl: 7    coQ10, ubiquinol, 100 mg Cap, Take 100 mg by mouth once daily., Disp: , Rfl:     doxepin (SINEQUAN) 25 MG capsule, Take 1 capsule (25 mg total) by mouth every evening., Disp: 90 capsule, Rfl: 3    ergocalciferol (ERGOCALCIFEROL) 50,000 unit Cap, Take one capsule twice monthly., Disp: 6 capsule, Rfl: 3    gabapentin (NEURONTIN) 300 MG capsule, Take 300 mg by mouth 3 (three) times daily., Disp: , Rfl:     ibuprofen (ADVIL,MOTRIN) 600 MG tablet, Take 1 tablet (600 mg total) by mouth every 6 (six) hours as needed for Pain., Disp: 30 tablet, Rfl: 0    levothyroxine (SYNTHROID) 75 MCG tablet, Take 1 tablet (75 mcg total) by mouth before breakfast. Except half a tablet on Sundays, Disp: 90 tablet, Rfl: 3    losartan (COZAAR) 100 MG tablet, Take 1 tablet (100 mg total) by mouth once daily., Disp: 90 tablet, Rfl: 3    rosuvastatin (CRESTOR) 10 MG tablet, Take 1 tablet (10 mg total) by mouth once daily., Disp: 90 tablet, Rfl: 4    blood-glucose meter kit, True Metrix glucometer check glucose once daily, Disp: 1 each, Rfl: 0    calcium carbonate (TUMS) 200 mg calcium (500 mg) chewable tablet, Chew and swallow 2 tablets (1,000 mg total) by mouth 3 (three) times daily. for 14 days (Patient not taking: Reported on 7/6/2023), Disp: 100 tablet, Rfl: 0    fluticasone propionate (FLONASE) 50 mcg/actuation nasal spray, 1 spray (50 mcg total) by Each Nostril route once daily., Disp: 16 g, Rfl: 5    loratadine (CLARITIN) 10 mg tablet, Take 1 tablet (10 mg total) by mouth once daily., Disp: 30 tablet, Rfl: 5    tiZANidine (ZANAFLEX) 2 MG tablet, Take 1 tablet (2 mg total) by mouth  "nightly as needed (muscle tension)., Disp: 30 tablet, Rfl: 0  No current facility-administered medications for this visit.    Facility-Administered Medications Ordered in Other Visits:     lactated ringers infusion, , Intravenous, Once PRN, Julián Chiang MD    Objective:        Physical Exam  Vitals reviewed.   Constitutional:       Appearance: Normal appearance.   HENT:      Head: Normocephalic and atraumatic.   Eyes:      General:         Right eye: No discharge.         Left eye: No discharge.      Conjunctiva/sclera: Conjunctivae normal.   Cardiovascular:      Rate and Rhythm: Normal rate and regular rhythm.      Heart sounds: Normal heart sounds.   Pulmonary:      Effort: Pulmonary effort is normal.      Breath sounds: Normal breath sounds.   Musculoskeletal:         General: Normal range of motion.      Cervical back: Normal range of motion and neck supple.   Skin:     General: Skin is warm and dry.   Neurological:      Mental Status: She is alert and oriented to person, place, and time.   Psychiatric:         Mood and Affect: Mood normal.         Behavior: Behavior normal.         Thought Content: Thought content normal.         Judgment: Judgment normal.           Visit Vitals  BP (P) 118/68   Pulse 82   Temp 98.2 °F (36.8 °C)   Ht 5' 6" (1.676 m)   Wt 74.5 kg (164 lb 3.9 oz)   SpO2 100%   BMI 26.51 kg/m²      Assessment:         1. Allergic rhinitis, unspecified seasonality, unspecified trigger    2. Muscle tension pain        Plan:         Erica was seen today for nasal congestion.    Diagnoses and all orders for this visit:    Allergic rhinitis, unspecified seasonality, unspecified trigger  -     fluticasone propionate (FLONASE) 50 mcg/actuation nasal spray; 1 spray (50 mcg total) by Each Nostril route once daily.  -     loratadine (CLARITIN) 10 mg tablet; Take 1 tablet (10 mg total) by mouth once daily.    Muscle tension pain  -     tiZANidine (ZANAFLEX) 2 MG tablet; Take 1 tablet (2 mg total) by mouth " nightly as needed (muscle tension).  -    Discussed drowsiness may occur with use of muscle relaxer  -    Continue Tylenol as needed. Alternate ice/ heat. Consider PT if symptoms persist.        Follow up if symptoms worsen or fail to improve.         Family Medicine Physician Assistant     Future Appointments       Date Provider Specialty Appt Notes    1/2/2024 Narayan Myers MD Family Medicine Annual    1/23/2024 Ryan Torres MD Otolaryngology 6months    1/26/2024 Narayan Myers MD Family Medicine annual    4/19/2024  Lab lab    4/26/2024 KATHY Robbins PA-C Endocrinology f/u thyroid             All of your core healthy metrics are met.       I spent a total of 25 minutes on the day of the visit.This includes face to face time and non-face to face time preparing to see the patient (eg, review of tests), obtaining and/or reviewing separately obtained history, documenting clinical information in the electronic or other health record, independently interpreting results and communicating results to the patient/family/caregiver, or care coordinator.    We have addressed [3] Low: 2 or more self-limited or minor problems / 1 stable chronic illness / 1 acute, uncomplicated illness or injury  The complexity of the data reviewed and analyzed for this visit was [3] Limited (Reviewed prior external note, ordered unique testing or reviewed the results of each unique test)   The risk of complications and/or morbidity or mortality are [3] Low risk   The level of Medical Decision Making for this visit is [3] Low

## 2023-11-28 NOTE — PATIENT INSTRUCTIONS
Claritin 10 mg or Zyrtec 10 mg daily     King Maldonado,     If you are due for any health screening(s) below please notify me so we can arrange them to be ordered and scheduled. Most healthy patients at your age complete them, but you are free to accept or refuse.     If you can't do it, I'll definitely understand. If you can, I'd certainly appreciate it!    All of your core healthy metrics are met.

## 2023-12-01 ENCOUNTER — HOSPITAL ENCOUNTER (OUTPATIENT)
Facility: HOSPITAL | Age: 78
Discharge: HOME | End: 2023-12-01
Attending: ANESTHESIOLOGY | Admitting: ANESTHESIOLOGY
Payer: MEDICARE

## 2023-12-01 DIAGNOSIS — M53.3 SACROILIAC JOINT PAIN: ICD-10-CM

## 2023-12-01 PROCEDURE — 27096 PR INJECTION,SACROILIAC JOINT: ICD-10-PCS | Mod: RT,,, | Performed by: ANESTHESIOLOGY

## 2023-12-01 PROCEDURE — 27096 INJECT SACROILIAC JOINT: CPT | Mod: RT | Performed by: ANESTHESIOLOGY

## 2023-12-01 PROCEDURE — 63600175 PHARM REV CODE 636 W HCPCS: Performed by: ANESTHESIOLOGY

## 2023-12-01 PROCEDURE — 27096 INJECT SACROILIAC JOINT: CPT | Mod: RT,,, | Performed by: ANESTHESIOLOGY

## 2023-12-01 PROCEDURE — 25000003 PHARM REV CODE 250: Performed by: ANESTHESIOLOGY

## 2023-12-01 PROCEDURE — 25500020 PHARM REV CODE 255: Performed by: ANESTHESIOLOGY

## 2023-12-01 RX ORDER — SODIUM CHLORIDE, SODIUM LACTATE, POTASSIUM CHLORIDE, CALCIUM CHLORIDE 600; 310; 30; 20 MG/100ML; MG/100ML; MG/100ML; MG/100ML
INJECTION, SOLUTION INTRAVENOUS CONTINUOUS
Status: DISCONTINUED | OUTPATIENT
Start: 2023-12-01 | End: 2024-02-05

## 2023-12-01 RX ORDER — LIDOCAINE HYDROCHLORIDE 10 MG/ML
1 INJECTION, SOLUTION EPIDURAL; INFILTRATION; INTRACAUDAL; PERINEURAL ONCE
Status: DISCONTINUED | OUTPATIENT
Start: 2023-12-01 | End: 2024-02-05

## 2023-12-01 RX ORDER — LIDOCAINE HYDROCHLORIDE 10 MG/ML
INJECTION, SOLUTION EPIDURAL; INFILTRATION; INTRACAUDAL; PERINEURAL
Status: DISCONTINUED | OUTPATIENT
Start: 2023-12-01 | End: 2023-12-01 | Stop reason: HOSPADM

## 2023-12-01 RX ORDER — METHYLPREDNISOLONE ACETATE 80 MG/ML
INJECTION, SUSPENSION INTRA-ARTICULAR; INTRALESIONAL; INTRAMUSCULAR; SOFT TISSUE
Status: DISCONTINUED | OUTPATIENT
Start: 2023-12-01 | End: 2023-12-01 | Stop reason: HOSPADM

## 2023-12-01 RX ORDER — BUPIVACAINE HYDROCHLORIDE 2.5 MG/ML
INJECTION, SOLUTION EPIDURAL; INFILTRATION; INTRACAUDAL
Status: DISCONTINUED | OUTPATIENT
Start: 2023-12-01 | End: 2023-12-01 | Stop reason: HOSPADM

## 2023-12-01 NOTE — DISCHARGE SUMMARY
[facility] - Periop Services  Discharge Note  Short Stay    Procedure(s) (LRB):  INJECTION,SACROILIAC JOINT (Right)      OUTCOME: Patient tolerated treatment/procedure well without complication and is now ready for discharge.    DISPOSITION: Home or Self Care    FINAL DIAGNOSIS:  <principal problem not specified>    FOLLOWUP: In clinic    DISCHARGE INSTRUCTIONS:    Discharge Procedure Orders   Notify your health care provider if you experience any of the following:  temperature >100.4     Notify your health care provider if you experience any of the following:  severe uncontrolled pain     Notify your health care provider if you experience any of the following:  redness, tenderness, or signs of infection (pain, swelling, redness, odor or green/yellow discharge around incision site)     Activity as tolerated        TIME SPENT ON DISCHARGE: 30 minutes

## 2023-12-01 NOTE — OP NOTE
Procedure Date: 12/1/2023    PROCEDURE:  Right sacroiliac joint injection utilizing fluoroscopy.    DIAGNOSIS: Right sided sacroiliitis.  Post op diagnosis: same    PHYSICIAN: Julián Chiang MD    MEDICATIONS INJECTED:  Methylprednisone 80mg and 1.5ml Bupivacaine 0.25% in each joint    LOCAL ANESTHETIC USED: Lidocaine 1%,2ml total.     SEDATION MEDICATIONS: None    ESTIMATED BLOOD LOSS: None    COMPLICATIONS: None    TECHNIQUE:   Time-out taken to identify patient and procedure side prior to starting the procedure. After placing the patient in the prone position, the patient was prepped and draped in the usual sterile fashion using ChloraPrep and sterile towels.  The area was determined under fluoroscopy in the AP view.  Lidocaine was injected by raising a wheal and going down to the periosteum using a 25-gauge 1.5 inch needle.  The 3.5 inch 22-gauge spinal needle was introduced into inferior opening of the right sacroiliac joint.  Negative pressure applied to confirm no intravascular placement.  0.5 ml of contrast dye was injected to confirm placement and to confirm that there was no vascular uptake. The medication was then injected slowly. The patient tolerated the procedure well.    The patient was monitored after the procedure.  The patient was given post procedure and discharge instructions to follow at home. The patient was discharged in a stable condition

## 2023-12-04 VITALS
WEIGHT: 160 LBS | BODY MASS INDEX: 25.71 KG/M2 | OXYGEN SATURATION: 100 % | HEIGHT: 66 IN | SYSTOLIC BLOOD PRESSURE: 130 MMHG | DIASTOLIC BLOOD PRESSURE: 74 MMHG | HEART RATE: 68 BPM | TEMPERATURE: 98 F | RESPIRATION RATE: 18 BRPM

## 2023-12-18 ENCOUNTER — TELEPHONE (OUTPATIENT)
Dept: ENDOCRINOLOGY | Facility: CLINIC | Age: 78
End: 2023-12-18
Payer: MEDICARE

## 2023-12-18 NOTE — TELEPHONE ENCOUNTER
Called and spoke with patient again to explain in details the results of the thyroglobulin level.  Once explained to pt she verified understanding.

## 2023-12-18 NOTE — TELEPHONE ENCOUNTER
----- Message from Rafael Fletcher sent at 12/18/2023  7:28 AM CST -----  Type: Needs Medical Advice  Who Called:  pt  Best Call Back Number: [phone]  Additional Information: pt is calling the office to see what the next steps are since her labs were positive. Please call her house phone up until 9 am and her cell phone after that. Thanks!

## 2023-12-18 NOTE — TELEPHONE ENCOUNTER
Spoke with patient regarding lab result on thyroglobulin level.  Explained per MsYesenia [place], level is decreasing so we will just monitor levels at this point.  Pt verbalized understanding.

## 2023-12-18 NOTE — TELEPHONE ENCOUNTER
----- Message from Cindy Marinelli sent at 12/18/2023  1:46 PM CST -----  Type: Needs Medical Advice  Who Called:  pt  Best Call Back Number: [phone]  Additional Information: pt is calling the office to speak with Lesley again in regards to the same issue that was spoken about before. The pt has a few more questions. Please call back to advise Thanks!

## 2023-12-19 ENCOUNTER — TELEPHONE (OUTPATIENT)
Dept: FAMILY MEDICINE | Facility: CLINIC | Age: 78
End: 2023-12-19
Payer: MEDICARE

## 2023-12-19 NOTE — TELEPHONE ENCOUNTER
----- Message from Jolanta Barkley sent at 12/19/2023  2:20 PM CST -----  Regarding: Needs return call  Type: Needs Medical Advice  Who Called:  Erica    Nicolás Call Back Number: [phone] or [phone]    Additional Information: Pt said it seems like she is getting very tired and overwhelmed and was wanting to speak to the nurse regarding this having trouble concentrating she said this has been going on for a while lately

## 2023-12-19 NOTE — TELEPHONE ENCOUNTER
Patient says she has had severe fatigue. Bp now was 108/72 p92- appt made 12/20/23 at 9:40 with .

## 2023-12-20 ENCOUNTER — OFFICE VISIT (OUTPATIENT)
Dept: FAMILY MEDICINE | Facility: CLINIC | Age: 78
End: 2023-12-20
Attending: FAMILY MEDICINE
Payer: MEDICARE

## 2023-12-20 VITALS
HEIGHT: 66 IN | DIASTOLIC BLOOD PRESSURE: 62 MMHG | HEART RATE: 76 BPM | SYSTOLIC BLOOD PRESSURE: 126 MMHG | TEMPERATURE: 98 F | WEIGHT: 161.69 LBS | BODY MASS INDEX: 25.98 KG/M2 | OXYGEN SATURATION: 99 %

## 2023-12-20 DIAGNOSIS — E11.69 HYPERLIPIDEMIA ASSOCIATED WITH TYPE 2 DIABETES MELLITUS: Chronic | ICD-10-CM

## 2023-12-20 DIAGNOSIS — E78.5 HYPERLIPIDEMIA ASSOCIATED WITH TYPE 2 DIABETES MELLITUS: Chronic | ICD-10-CM

## 2023-12-20 DIAGNOSIS — E89.0 POSTOPERATIVE HYPOTHYROIDISM: Chronic | ICD-10-CM

## 2023-12-20 DIAGNOSIS — E11.59 HYPERTENSION ASSOCIATED WITH DIABETES: Chronic | ICD-10-CM

## 2023-12-20 DIAGNOSIS — E11.00 TYPE 2 DIABETES MELLITUS WITH HYPEROSMOLARITY WITHOUT COMA, WITHOUT LONG-TERM CURRENT USE OF INSULIN: Chronic | ICD-10-CM

## 2023-12-20 DIAGNOSIS — R41.3 MEMORY IMPAIRMENT: Primary | ICD-10-CM

## 2023-12-20 DIAGNOSIS — F41.1 GAD (GENERALIZED ANXIETY DISORDER): Chronic | ICD-10-CM

## 2023-12-20 DIAGNOSIS — F32.9 REACTIVE DEPRESSION: Chronic | ICD-10-CM

## 2023-12-20 DIAGNOSIS — C73 THYROID CANCER: Chronic | ICD-10-CM

## 2023-12-20 DIAGNOSIS — I15.2 HYPERTENSION ASSOCIATED WITH DIABETES: Chronic | ICD-10-CM

## 2023-12-20 DIAGNOSIS — N18.31 STAGE 3A CHRONIC KIDNEY DISEASE: Chronic | ICD-10-CM

## 2023-12-20 DIAGNOSIS — G31.84 MCI (MILD COGNITIVE IMPAIRMENT): ICD-10-CM

## 2023-12-20 PROCEDURE — 3288F FALL RISK ASSESSMENT DOCD: CPT | Mod: CPTII,S$GLB,, | Performed by: FAMILY MEDICINE

## 2023-12-20 PROCEDURE — 1159F MED LIST DOCD IN RCRD: CPT | Mod: CPTII,S$GLB,, | Performed by: FAMILY MEDICINE

## 2023-12-20 PROCEDURE — 99999 PR PBB SHADOW E&M-EST. PATIENT-LVL III: ICD-10-PCS | Mod: PBBFAC,,, | Performed by: FAMILY MEDICINE

## 2023-12-20 PROCEDURE — 99999 PR PBB SHADOW E&M-EST. PATIENT-LVL III: CPT | Mod: PBBFAC,,, | Performed by: FAMILY MEDICINE

## 2023-12-20 PROCEDURE — 1126F AMNT PAIN NOTED NONE PRSNT: CPT | Mod: CPTII,S$GLB,, | Performed by: FAMILY MEDICINE

## 2023-12-20 PROCEDURE — 99214 PR OFFICE/OUTPT VISIT, EST, LEVL IV, 30-39 MIN: ICD-10-PCS | Mod: S$GLB,,, | Performed by: FAMILY MEDICINE

## 2023-12-20 PROCEDURE — 1126F PR PAIN SEVERITY QUANTIFIED, NO PAIN PRESENT: ICD-10-PCS | Mod: CPTII,S$GLB,, | Performed by: FAMILY MEDICINE

## 2023-12-20 PROCEDURE — 3288F PR FALLS RISK ASSESSMENT DOCUMENTED: ICD-10-PCS | Mod: CPTII,S$GLB,, | Performed by: FAMILY MEDICINE

## 2023-12-20 PROCEDURE — 1160F PR REVIEW ALL MEDS BY PRESCRIBER/CLIN PHARMACIST DOCUMENTED: ICD-10-PCS | Mod: CPTII,S$GLB,, | Performed by: FAMILY MEDICINE

## 2023-12-20 PROCEDURE — 3078F PR MOST RECENT DIASTOLIC BLOOD PRESSURE < 80 MM HG: ICD-10-PCS | Mod: CPTII,S$GLB,, | Performed by: FAMILY MEDICINE

## 2023-12-20 PROCEDURE — 1101F PR PT FALLS ASSESS DOC 0-1 FALLS W/OUT INJ PAST YR: ICD-10-PCS | Mod: CPTII,S$GLB,, | Performed by: FAMILY MEDICINE

## 2023-12-20 PROCEDURE — 1159F PR MEDICATION LIST DOCUMENTED IN MEDICAL RECORD: ICD-10-PCS | Mod: CPTII,S$GLB,, | Performed by: FAMILY MEDICINE

## 2023-12-20 PROCEDURE — 3074F SYST BP LT 130 MM HG: CPT | Mod: CPTII,S$GLB,, | Performed by: FAMILY MEDICINE

## 2023-12-20 PROCEDURE — 3078F DIAST BP <80 MM HG: CPT | Mod: CPTII,S$GLB,, | Performed by: FAMILY MEDICINE

## 2023-12-20 PROCEDURE — 1101F PT FALLS ASSESS-DOCD LE1/YR: CPT | Mod: CPTII,S$GLB,, | Performed by: FAMILY MEDICINE

## 2023-12-20 PROCEDURE — 99214 OFFICE O/P EST MOD 30 MIN: CPT | Mod: S$GLB,,, | Performed by: FAMILY MEDICINE

## 2023-12-20 PROCEDURE — 1160F RVW MEDS BY RX/DR IN RCRD: CPT | Mod: CPTII,S$GLB,, | Performed by: FAMILY MEDICINE

## 2023-12-20 PROCEDURE — 3074F PR MOST RECENT SYSTOLIC BLOOD PRESSURE < 130 MM HG: ICD-10-PCS | Mod: CPTII,S$GLB,, | Performed by: FAMILY MEDICINE

## 2023-12-20 RX ORDER — DONEPEZIL HYDROCHLORIDE 5 MG/1
5 TABLET, FILM COATED ORAL NIGHTLY
Qty: 30 TABLET | Refills: 5 | Status: SHIPPED | OUTPATIENT
Start: 2023-12-20 | End: 2024-02-16

## 2023-12-20 NOTE — PROGRESS NOTES
Subjective:       Patient ID: Erica Masterson is a 78 y.o. female.    Chief Complaint: Fatigue    78-year-old female with a history of hereditary and idiopathic peripheral neuropathy, degenerative disc disease of cervical spine, anxiety and depression, peripheral arterial disease, LVH, diabetes with nephropathy, hypertension, hyperlipidemia, stage 3 chronic kidney disease, hypocalcemia, thyroid cancer status post thyroidectomy and redo operation to remove remnants, iron deficiency anemia, postop hypothyroidism, osteoporosis without fracture, vitamin-D deficiency, diverticulosis, reflux, colon polyps and osteoarthritis comes in with a complaint that she is having some memory difficulties and that she feels that her brain is sluggish and can not keep up with her.  Physically she does not feel fatigued and she remains very active but she is having some memory issues, possibly affected by some sleep deprivation.  She is taking several medications very sparingly that might cause impaired cognition including tizanidine, doxepin, and she was on gabapentin but no longer has that.  Both of these medications she avoids taking together and both of them are used once a week or less.  She was instructed to watch to see if she notes any decrease in cognition after using them.  She has an upcoming appointment with Endocrinology to follow-up on her thyroid cancer but recent lab was satisfactory with a slightly suppressed TSH and a low normal T4.  She has on no diabetic medications at this time and her A1c was 5.8 in October, her vitamin-D was just below normal at 28 and she is taking a supplement.    Past Medical History:  3/26/2012: Adenomatous polyp of colon  No date: Allergy  No date: Anxiety  No date: Cancer  No date: Cataract  No date: Colon polyp  04/03/2012: Degenerative arthritis of knee  No date: Diverticulosis  No date: Encounter for blood transfusion  No date: GERD (gastroesophageal reflux disease)  2021: History of  thyroid cancer  No date: Hyperlipidemia  No date: Hypertension  5/20/2013: Lateral meniscal tear  03/26/2012: Multinodular goiter  01/18/2010: Myopathy, unspecified  No date: Tuberculosis    Past Surgical History:  2015: BREAST BIOPSY; Left      Comment:  benign  No date: CHOLECYSTECTOMY  12/02/2015: COLONOSCOPY      Comment:  Dr. Britton, multiple polyps, recheck five years-three                years if more than 2 polyps are adenomatous  12/02/2015: COLONOSCOPY; N/A  03/20/2019: COLONOSCOPY; N/A      Comment:  Procedure: COLONOSCOPY;  Surgeon: Jose Britton MD;                Location: [facility];  Service: Endoscopy;  Laterality:                N/A;  05/20/2020: COLONOSCOPY; N/A      Comment:  Dr. Britton; internal hemorrhoids; diverticulosis;                polyps removed; repeat in 3 years  11/16/2023: COLONOSCOPY; N/A      Comment:  Procedure: COLONOSCOPY(instructions mailed 11/9);                 Surgeon: Jose Britton MD;  Location: [facility];                 Service: Endoscopy;  Laterality: N/A;  08/26/2020: CYSTOSCOPY; N/A      Comment:  Procedure: CYSTOSCOPY;  Surgeon: Charlotte Walters Jr., MD;               Location: [facility] OR;  Service: Urology;  Laterality: N/A;  No date: DILATION AND CURETTAGE OF UTERUS  09/13/2021: DISSECTION OF NECK; Left      Comment:  Procedure: DISSECTION, NECK;  Surgeon: Ryan Torres MD;  Location: [facility] 2ND FLR;  Service: ENT;                 Laterality: Left;  7/20/2023: EPIDURAL STEROID INJECTION INTO LUMBAR SPINE; N/A      Comment:  Procedure: Injection-steroid-epidural-lumbar;  Surgeon:                Julián Chiang MD;  Location: [facility] OR;  Service: Pain                Management;  Laterality: N/A;  L5-s1  08/22/2022: ESOPHAGEAL MANOMETRY WITH MEASUREMENT OF IMPEDANCE; N/A      Comment:  Procedure: MANOMETRY, ESOPHAGUS, WITH IMPEDANCE                MEASUREMENT;  Surgeon: Pantera Mcguire MD;  Location: [facility] (4TH FLR);  Service:  Endoscopy;  Laterality: N/A;                 8/4 fully vaccinated; instructions mailed-st  05/20/2020: ESOPHAGOGASTRODUODENOSCOPY; N/A      Comment:  Dr. Britton; small hiatal hernia; gastritis; 8 gastric                polyps removed  04/01/2022: ESOPHAGOGASTRODUODENOSCOPY; N/A      Comment:  Procedure: EGD (ESOPHAGOGASTRODUODENOSCOPY);  Surgeon:                Jose Britton MD;  Location: [facility];  Service:                Endoscopy;  Laterality: N/A;  No date: FOOT SURGERY  No date: HYSTERECTOMY      Comment:  TVH/BSO > 20 years  8/4/2023: INCISION OF VULVA; Bilateral      Comment:  Procedure: EXCISION, CYST, LABIA AND OTHER INDICATED                PROCEDURES;  Surgeon: Mat Beach MD;  Location:                [facility] OR;  Service: OB/GYN;  Laterality: Bilateral;                 EXCYSION OF VULVAR CYST  12/1/2023: INJECTION, SACROILIAC JOINT; Right      Comment:  Procedure: INJECTION,SACROILIAC JOINT;  Surgeon: Julián Chiang MD;  Location: [facility] OR;  Service:                Anesthesiology;  Laterality: Right;  sacroiliac injection  06/04/2020: INTRALUMINAL GASTROINTESTINAL TRACT IMAGING VIA CAPSULE;   N/A      Comment:  Procedure: IMAGING PROCEDURE, GI TRACT, INTRALUMINAL,                VIA CAPSULE;  Surgeon: Jose Britton MD;  Location:                [facility];  Service: Endoscopy;  Laterality: N/A;  06/06/2013: KNEE SURGERY      Comment:  right knee tear Dr Iverson   09/17/2020: LAPAROSCOPIC CHOLECYSTECTOMY; N/A      Comment:  Procedure: CHOLECYSTECTOMY, LAPAROSCOPIC;  Surgeon:                Forrest Veronica MD;  Location: [facility];  Service:                General;  Laterality: N/A;  1966: LUNG LOBECTOMY      Comment:  right middle lobectomy, due to Tb  No date: OOPHORECTOMY  No date: SHOULDER SURGERY      Comment:  francis   05/19/2021: THYROIDECTOMY; Bilateral      Comment:  Procedure: THYROIDECTOMY;  Surgeon: Jamia Amezquita MD;  Location: [facility] OR [address] [place] [address];   Service: General;                 Laterality: Bilateral;  09/13/2021: THYROIDECTOMY; Bilateral      Comment:  Procedure: THYROIDECTOMY WITH INTRA-OP PTH;  Surgeon:                Ryan Torres MD;  Location: [facility] OR [address] [place] [address];  Service:               ENT;  Laterality: Bilateral;  NIM tube  Intraop PTH    Current Outpatient Medications on File Prior to Visit:  amLODIPine (NORVASC) 2.5 MG tablet, Take 1 tablet by mouth once daily, Disp: 90 tablet, Rfl: 0  artificial tears (ISOPTO TEARS) 0.5 % ophthalmic solution, 1 drop as needed., Disp: , Rfl:   blood sugar diagnostic (BLOOD GLUCOSE TEST) Strp, True Metrix glucose strips check once daily, Disp: 100 each, Rfl: 3  coQ10, ubiquinol, 100 mg Cap, Take 100 mg by mouth once daily., Disp: , Rfl:   doxepin (SINEQUAN) 25 MG capsule, Take 1 capsule (25 mg total) by mouth every evening., Disp: 90 capsule, Rfl: 3  ergocalciferol (ERGOCALCIFEROL) 50,000 unit Cap, Take one capsule twice monthly., Disp: 6 capsule, Rfl: 3  fluticasone propionate (FLONASE) 50 mcg/actuation nasal spray, 1 spray (50 mcg total) by Each Nostril route once daily., Disp: 16 g, Rfl: 5  ibuprofen (ADVIL,MOTRIN) 600 MG tablet, Take 1 tablet (600 mg total) by mouth every 6 (six) hours as needed for Pain., Disp: 30 tablet, Rfl: 0  levothyroxine (SYNTHROID) 75 MCG tablet, Take 1 tablet (75 mcg total) by mouth before breakfast. Except half a tablet on Sundays, Disp: 90 tablet, Rfl: 3  losartan (COZAAR) 100 MG tablet, Take 1 tablet (100 mg total) by mouth once daily., Disp: 90 tablet, Rfl: 3  rosuvastatin (CRESTOR) 10 MG tablet, Take 1 tablet (10 mg total) by mouth once daily., Disp: 90 tablet, Rfl: 4  tiZANidine (ZANAFLEX) 2 MG tablet, Take 1 tablet (2 mg total) by mouth nightly as needed (muscle tension)., Disp: 30 tablet, Rfl: 0  blood-glucose meter kit, True Metrix glucometer check glucose once daily, Disp: 1 each, Rfl: 0  calcium carbonate (TUMS) 200 mg calcium (500 mg) chewable tablet, Chew and swallow 2  tablets (1,000 mg total) by mouth 3 (three) times daily. for 14 days (Patient not taking: Reported on 7/6/2023), Disp: 100 tablet, Rfl: 0  conjugated estrogens (PREMARIN) vaginal cream, Place 0.5 g vaginally once daily AND 0.5 g twice a week., Disp: 30 g, Rfl: 7  gabapentin (NEURONTIN) 300 MG capsule, Take 300 mg by mouth 3 (three) times daily., Disp: , Rfl:   loratadine (CLARITIN) 10 mg tablet, Take 1 tablet (10 mg total) by mouth once daily. (Patient not taking: Reported on 12/20/2023), Disp: 30 tablet, Rfl: 5    Current Facility-Administered Medications on File Prior to Visit:  lactated ringers infusion, , Intravenous, Once PRN, Julián Chiang MD  lactated ringers infusion, , Intravenous, Continuous, Julián Chiang MD  LIDOcaine (PF) 10 mg/ml (1%) injection 10 mg, 1 mL, Intradermal, Once, Julián Chiang MD            Review of Systems   Constitutional:  Negative for chills, diaphoresis, fatigue and fever.   Respiratory:  Negative for chest tightness and shortness of breath.    Cardiovascular:  Negative for chest pain and palpitations.   Gastrointestinal:  Negative for abdominal pain, constipation, diarrhea, nausea and vomiting.   Psychiatric/Behavioral:  Positive for decreased concentration and sleep disturbance. Negative for agitation, behavioral problems, confusion, dysphoric mood and hallucinations. The patient is not nervous/anxious and is not hyperactive.        Objective:      Physical Exam  Vitals and nursing note reviewed.   Constitutional:       General: She is not in acute distress.     Appearance: Normal appearance. She is normal weight. She is not ill-appearing, toxic-appearing or diaphoretic.      Comments: Good blood pressure control  Normal pulse with regular rhythm   Normal weight for age with a BMI of 26.1, she is up 3.8 lb from her January 25, 2023 visit with me   HENT:      Head: Normocephalic and atraumatic.      Right Ear: Tympanic membrane, ear canal and external ear normal. There is no impacted  cerumen.      Left Ear: Tympanic membrane, ear canal and external ear normal. There is no impacted cerumen.      Nose: Nose normal. No congestion or rhinorrhea.      Mouth/Throat:      Mouth: Mucous membranes are moist.      Pharynx: Oropharynx is clear. No oropharyngeal exudate or posterior oropharyngeal erythema.   Eyes:      General: No scleral icterus.        Right eye: No discharge.         Left eye: No discharge.      Extraocular Movements: Extraocular movements intact.      Conjunctiva/sclera: Conjunctivae normal.      Pupils: Pupils are equal, round, and reactive to light.   Neck:      Vascular: No carotid bruit.   Cardiovascular:      Rate and Rhythm: Normal rate and regular rhythm.      Heart sounds: Normal heart sounds. No murmur heard.     No friction rub. No gallop.   Pulmonary:      Effort: Pulmonary effort is normal. No respiratory distress.      Breath sounds: Normal breath sounds. No stridor. No wheezing, rhonchi or rales.   Chest:      Chest wall: No tenderness.   Abdominal:      General: Abdomen is flat. Bowel sounds are normal. There is no distension.      Palpations: Abdomen is soft. There is no mass.      Tenderness: There is no abdominal tenderness. There is no guarding or rebound.      Hernia: No hernia is present.   Musculoskeletal:         General: No swelling or tenderness.      Cervical back: Normal range of motion and neck supple. No rigidity or tenderness.      Right lower leg: No edema.      Left lower leg: No edema.   Lymphadenopathy:      Cervical: No cervical adenopathy.   Skin:     General: Skin is warm and dry.      Coloration: Skin is not jaundiced or pale.      Findings: No bruising, erythema or rash.   Neurological:      General: No focal deficit present.      Mental Status: She is alert and oriented to person, place, and time.      Cranial Nerves: No cranial nerve deficit.      Sensory: No sensory deficit.      Motor: No weakness.      Coordination: Coordination normal.       Gait: Gait normal.      Comments: She has some difficulty with immediate recall in a four word recall test and only recalls one of the four after 10 minutes   Psychiatric:         Mood and Affect: Mood normal.         Behavior: Behavior normal.         Thought Content: Thought content normal.         Judgment: Judgment normal.         Assessment:       1. Memory impairment    2. MCI (mild cognitive impairment)    3. RONNIE (generalized anxiety disorder), 2019    4. Reactive depression    5. Hypertension associated with diabetes    6. Hyperlipidemia associated with type 2 diabetes mellitus, baseline     7. Stage 3a chronic kidney disease    8. Thyroid cancer    9. Type 2 diabetes mellitus with hyperosmolarity without coma, without long-term current use of insulin    10. Postoperative hypothyroidism    11. BMI 26.0-26.9,adult        Plan:       1. Memory impairment  She clearly has some problems with recall.  I suggested that she hold the Crestor for 10 days and see if that improves her memory but I really do not expect that to be the problem.  She is going to avoid the sedative agents as much as possible and we are going to give her a trial on Aricept 5 mg daily.  Recheck with me in about three months, if she is getting some relief from the Aricept but insufficient we can increase it to the 10 mg dose or higher.  - donepeziL (ARICEPT) 5 MG tablet; Take 1 tablet (5 mg total) by mouth every evening.  Dispense: 30 tablet; Refill: 5    2. MCI (mild cognitive impairment)  See above    3. RONNIE (generalized anxiety disorder), 2019  Stable    4. Reactive depression  Stable, not currently on antidepressant    5. Hypertension associated with diabetes  Well controlled with no changes needed    6. Hyperlipidemia associated with type 2 diabetes mellitus, baseline   Lab Results   Component Value Date    CHOL 154 08/08/2022    CHOL 177 04/06/2022    CHOL 163 10/30/2020     Lab Results   Component Value Date    HDL 59  08/08/2022    HDL 70 04/06/2022    HDL 63 10/30/2020     Lab Results   Component Value Date    LDLCALC 86.4 08/08/2022    LDLCALC 97.4 04/06/2022    LDLCALC 91.6 10/30/2020     Lab Results   Component Value Date    TRIG 43 08/08/2022    TRIG 48 04/06/2022    TRIG 42 10/30/2020       Lab Results   Component Value Date    CHOLHDL 38.3 08/08/2022    CHOLHDL 39.5 04/06/2022    CHOLHDL 38.7 10/30/2020     Stable    7. Stage 3a chronic kidney disease  BMP  Lab Results   Component Value Date     10/25/2023    K 3.9 10/25/2023     10/25/2023    CO2 25 10/25/2023    BUN 14 10/25/2023    CREATININE 1.2 10/25/2023    CALCIUM 8.6 (L) 10/25/2023    ANIONGAP 12 10/25/2023    EGFRNORACEVR 45.8 (A) 10/25/2023     Stable    8. Thyroid cancer  Followed by Endocrinology    9. Type 2 diabetes mellitus with hyperosmolarity without coma, without long-term current use of insulin  Lab Results   Component Value Date    HGBA1C 5.8 (H) 10/25/2023     Well controlled on no medications, diet alone    10. Postoperative hypothyroidism  Lab Results   Component Value Date    TSH 0.322 (L) 10/25/2023     Slightly suppressed as appropriate for a history of thyroid cancer    11. BMI 26.0-26.9,adult  Good weight for age, no changes needed

## 2023-12-29 ENCOUNTER — TELEPHONE (OUTPATIENT)
Dept: FAMILY MEDICINE | Facility: CLINIC | Age: 78
End: 2023-12-29
Payer: MEDICARE

## 2023-12-29 ENCOUNTER — OFFICE VISIT (OUTPATIENT)
Dept: FAMILY MEDICINE | Facility: CLINIC | Age: 78
End: 2023-12-29
Payer: MEDICARE

## 2023-12-29 VITALS
OXYGEN SATURATION: 98 % | SYSTOLIC BLOOD PRESSURE: 120 MMHG | HEART RATE: 75 BPM | HEIGHT: 66 IN | DIASTOLIC BLOOD PRESSURE: 62 MMHG | WEIGHT: 163.13 LBS | BODY MASS INDEX: 26.22 KG/M2

## 2023-12-29 DIAGNOSIS — M54.2 NECK PAIN: Primary | ICD-10-CM

## 2023-12-29 DIAGNOSIS — M62.838 MUSCLE SPASM: ICD-10-CM

## 2023-12-29 PROCEDURE — 3078F DIAST BP <80 MM HG: CPT | Mod: CPTII,S$GLB,, | Performed by: NURSE PRACTITIONER

## 2023-12-29 PROCEDURE — 3074F PR MOST RECENT SYSTOLIC BLOOD PRESSURE < 130 MM HG: ICD-10-PCS | Mod: CPTII,S$GLB,, | Performed by: NURSE PRACTITIONER

## 2023-12-29 PROCEDURE — 3288F FALL RISK ASSESSMENT DOCD: CPT | Mod: CPTII,S$GLB,, | Performed by: NURSE PRACTITIONER

## 2023-12-29 PROCEDURE — 1101F PR PT FALLS ASSESS DOC 0-1 FALLS W/OUT INJ PAST YR: ICD-10-PCS | Mod: CPTII,S$GLB,, | Performed by: NURSE PRACTITIONER

## 2023-12-29 PROCEDURE — 1159F MED LIST DOCD IN RCRD: CPT | Mod: CPTII,S$GLB,, | Performed by: NURSE PRACTITIONER

## 2023-12-29 PROCEDURE — 3078F PR MOST RECENT DIASTOLIC BLOOD PRESSURE < 80 MM HG: ICD-10-PCS | Mod: CPTII,S$GLB,, | Performed by: NURSE PRACTITIONER

## 2023-12-29 PROCEDURE — 99999 PR PBB SHADOW E&M-EST. PATIENT-LVL III: CPT | Mod: PBBFAC,,, | Performed by: NURSE PRACTITIONER

## 2023-12-29 PROCEDURE — 99214 OFFICE O/P EST MOD 30 MIN: CPT | Mod: S$GLB,,, | Performed by: NURSE PRACTITIONER

## 2023-12-29 PROCEDURE — 1159F PR MEDICATION LIST DOCUMENTED IN MEDICAL RECORD: ICD-10-PCS | Mod: CPTII,S$GLB,, | Performed by: NURSE PRACTITIONER

## 2023-12-29 PROCEDURE — 1125F AMNT PAIN NOTED PAIN PRSNT: CPT | Mod: CPTII,S$GLB,, | Performed by: NURSE PRACTITIONER

## 2023-12-29 PROCEDURE — 3074F SYST BP LT 130 MM HG: CPT | Mod: CPTII,S$GLB,, | Performed by: NURSE PRACTITIONER

## 2023-12-29 PROCEDURE — 1125F PR PAIN SEVERITY QUANTIFIED, PAIN PRESENT: ICD-10-PCS | Mod: CPTII,S$GLB,, | Performed by: NURSE PRACTITIONER

## 2023-12-29 PROCEDURE — 99214 PR OFFICE/OUTPT VISIT, EST, LEVL IV, 30-39 MIN: ICD-10-PCS | Mod: S$GLB,,, | Performed by: NURSE PRACTITIONER

## 2023-12-29 PROCEDURE — 3288F PR FALLS RISK ASSESSMENT DOCUMENTED: ICD-10-PCS | Mod: CPTII,S$GLB,, | Performed by: NURSE PRACTITIONER

## 2023-12-29 PROCEDURE — 99999 PR PBB SHADOW E&M-EST. PATIENT-LVL III: ICD-10-PCS | Mod: PBBFAC,,, | Performed by: NURSE PRACTITIONER

## 2023-12-29 PROCEDURE — 1101F PT FALLS ASSESS-DOCD LE1/YR: CPT | Mod: CPTII,S$GLB,, | Performed by: NURSE PRACTITIONER

## 2023-12-29 RX ORDER — DIAZEPAM 2 MG/1
2 TABLET ORAL NIGHTLY PRN
Qty: 7 TABLET | Refills: 0 | Status: SHIPPED | OUTPATIENT
Start: 2023-12-29 | End: 2024-01-28

## 2023-12-29 NOTE — TELEPHONE ENCOUNTER
----- Message from Tim Mckeon sent at 12/29/2023  7:15 AM CST -----  Type:  Same Day Appointment Request    Caller is requesting a same day appointment.  Caller declined first available appointment listed below.      Name of Caller:  Patient  When is the first available appointment?    Symptoms:  Nausea, tightness in throat after Thyroid medication, sore shoulder and neck  Best Call Back Number:   [phone]  Additional Information:

## 2023-12-29 NOTE — PROGRESS NOTES
This dictation has been generated using Modal Fluency Dictation some phonetic errors may occur. Please contact author for clarification if needed.     Problem List Items Addressed This Visit    None  Visit Diagnoses       Neck pain    -  Primary    Muscle spasm                Orders Placed This Encounter    diazePAM (VALIUM) 2 MG tablet     Neck pain and muscle spasm.  Brief diazepam use as above.  Memory issues may not have picked up all of her meds from last visit.  Gave her a medication list and reviewed it with her.  Highlighted medications from last office visit.   is sick and has COVID.  Discussed mask and isolation.    Patient does not have any symptoms   In excessive of 30 minutes total time spent for evaluation and management services. Time included elements of the following: time spent preparing to see patient, obtaining and reviewing separately obtained history, exam, evaluation, counseling and education of patient/family member or care giver, documenting in the HMR, independently interpreting results and communication of results, coordination of care ordering medications, tests, or procedures, referral and communication to other health care professionals.    Follow up if symptoms worsen or fail to improve.    ________________________________________________________________  ________________________________________________________________      Chief Complaint   Patient presents with    Neck Pain    Shoulder Pain    memory problems     History of present illness  This 78 y.o. presents today for complaint of memory issue neck spasm and pain.    Had an appointment with her primary care physician recently and spasm noted.  She had tizanidine to take intermittently.  She has not get a lot of relief from that option.    Memory issues she is unsure if she picked up her medication.  Her  is with her today.  It seems she may be only taking 1 of the medications as previously prescribed.  Discussed  with her the mechanism of action and sticking with meds for months.  Discussed with her primary care's plan to increase in 3 months if not satisfied with 5 mg Aricept.        Past Medical History:   Diagnosis Date    Adenomatous polyp of colon 3/26/2012    Allergy     Anxiety     Cancer     Cataract     Colon polyp     Degenerative arthritis of knee 04/03/2012    Diverticulosis     Encounter for blood transfusion     GERD (gastroesophageal reflux disease)     History of thyroid cancer 2021    Hyperlipidemia     Hypertension     Lateral meniscal tear 5/20/2013    Multinodular goiter 03/26/2012    Myopathy, unspecified 01/18/2010    Tuberculosis        Past Surgical History:   Procedure Laterality Date    BREAST BIOPSY Left 2015    benign    CHOLECYSTECTOMY      COLONOSCOPY  12/02/2015    Dr. Britton, multiple polyps, recheck five years-three years if more than 2 polyps are adenomatous    COLONOSCOPY N/A 12/02/2015    COLONOSCOPY N/A 03/20/2019    Procedure: COLONOSCOPY;  Surgeon: Jose Britton MD;  Location: [facility];  Service: Endoscopy;  Laterality: N/A;    COLONOSCOPY N/A 05/20/2020    Dr. Britton; internal hemorrhoids; diverticulosis; polyps removed; repeat in 3 years    COLONOSCOPY N/A 11/16/2023    Procedure: COLONOSCOPY(instructions mailed 11/9);  Surgeon: Jose Britton MD;  Location: [facility];  Service: Endoscopy;  Laterality: N/A;    CYSTOSCOPY N/A 08/26/2020    Procedure: CYSTOSCOPY;  Surgeon: Charlotte Walters Jr., MD;  Location: [facility] OR;  Service: Urology;  Laterality: N/A;    DILATION AND CURETTAGE OF UTERUS      DISSECTION OF NECK Left 09/13/2021    Procedure: DISSECTION, NECK;  Surgeon: Ryan Torres MD;  [facility];  Service: ENT;  Laterality: Left;    EPIDURAL STEROID INJECTION INTO LUMBAR SPINE N/A 7/20/2023    Procedure: Injection-steroid-epidural-lumbar;  Surgeon: Julián Chiang MD;  [facility];  Service: Pain Management;  Laterality: N/A;  L5-s1    ESOPHAGEAL  MANOMETRY WITH MEASUREMENT OF IMPEDANCE N/A 08/22/2022    Procedure: MANOMETRY, ESOPHAGUS, WITH IMPEDANCE MEASUREMENT;  Surgeon: Pantera Mcguire MD;  Location: [facility] (4TH FLR);  Service: Endoscopy;  Laterality: N/A;  8/4 fully vaccinated; instructions mailed-st    ESOPHAGOGASTRODUODENOSCOPY N/A 05/20/2020    Dr. Britton; small hiatal hernia; gastritis; 8 gastric polyps removed    ESOPHAGOGASTRODUODENOSCOPY N/A 04/01/2022    Procedure: EGD (ESOPHAGOGASTRODUODENOSCOPY);  Surgeon: Jose Britton MD;  Location: [facility];  Service: Endoscopy;  Laterality: N/A;    FOOT SURGERY      HYSTERECTOMY      TVH/BSO > 20 years    INCISION OF VULVA Bilateral 8/4/2023    Procedure: EXCISION, CYST, LABIA AND OTHER INDICATED PROCEDURES;  Surgeon: Mat Beach MD;  Location: [facility] OR;  Service: OB/GYN;  Laterality: Bilateral;  EXCYSION OF VULVAR CYST    INJECTION, SACROILIAC JOINT Right 12/1/2023    Procedure: INJECTION,SACROILIAC JOINT;  Surgeon: Julián Chiang MD;  Location: [facility] OR;  Service: Anesthesiology;  Laterality: Right;  sacroiliac injection    INTRALUMINAL GASTROINTESTINAL TRACT IMAGING VIA CAPSULE N/A 06/04/2020    Procedure: IMAGING PROCEDURE, GI TRACT, INTRALUMINAL, VIA CAPSULE;  Surgeon: Jose Britton MD;  Location: [facility];  Service: Endoscopy;  Laterality: N/A;    KNEE SURGERY  06/06/2013    right knee tear Dr Iverson     LAPAROSCOPIC CHOLECYSTECTOMY N/A 09/17/2020    Procedure: CHOLECYSTECTOMY, LAPAROSCOPIC;  Surgeon: Forrest Veronica MD;  Location: [facility];  Service: General;  Laterality: N/A;    LUNG LOBECTOMY  1966    right middle lobectomy, due to Tb    OOPHORECTOMY      SHOULDER SURGERY      francis     THYROIDECTOMY Bilateral 05/19/2021    Procedure: THYROIDECTOMY;  Surgeon: Jamia Amezquita MD;  Location: [facility] 2ND FLR;  Service: General;  Laterality: Bilateral;    THYROIDECTOMY Bilateral 09/13/2021    Procedure: THYROIDECTOMY WITH INTRA-OP PTH;  Surgeon: Ryan Torres MD;  Location: [facility]  OR 2ND FLR;  Service: ENT;  Laterality: Bilateral;  NIM tube  Intraop PTH       Family History   Problem Relation Age of Onset    Colon cancer Other     Cancer Mother     Hypertension Mother     Hyperlipidemia Mother     Cancer Brother     Hypertension Father     Emphysema Father     Diabetes Son     Hypertension Son     Alcohol abuse Son     Diabetes Maternal Aunt     Alzheimer's disease Maternal Uncle     Cancer Maternal Grandmother     No Known Problems Daughter     Dementia Sister     Alzheimer's disease Sister     Breast cancer Sister 60    Obesity Paternal Uncle     Prostate cancer Other     Cancer Brother     Melanoma Neg Hx     Psoriasis Neg Hx     Lupus Neg Hx     Eczema Neg Hx     Amblyopia Neg Hx     Blindness Neg Hx     Cataracts Neg Hx     Glaucoma Neg Hx     Macular degeneration Neg Hx     Retinal detachment Neg Hx     Strabismus Neg Hx     Stroke Neg Hx     Thyroid cancer Neg Hx     Colon polyps Neg Hx     Ulcerative colitis Neg Hx     Stomach cancer Neg Hx     Rectal cancer Neg Hx        Social History     Socioeconomic History    Marital status:    Tobacco Use    Smoking status: Never    Smokeless tobacco: Never   Substance and Sexual Activity    Alcohol use: Not Currently     Comment: stopped 2019    Drug use: No    Sexual activity: Not Currently     Social Determinants of Health     Financial Resource Strain: Low Risk  (1/19/2023)    Overall Financial Resource Strain (CARDIA)     Difficulty of Paying Living Expenses: Not hard at all   Food Insecurity: No Food Insecurity (1/19/2023)    Hunger Vital Sign     Worried About Running Out of Food in the Last Year: Never true     Ran Out of Food in the Last Year: Never true   Transportation Needs: No Transportation Needs (1/19/2023)    PRAPARE - Transportation     Lack of Transportation (Medical): No     Lack of Transportation (Non-Medical): No   Physical Activity: Sufficiently Active (1/19/2023)    Exercise Vital Sign     Days of Exercise per  Week: 3 days     Minutes of Exercise per Session: 60 min   Stress: No Stress Concern Present (1/19/2023)    [employer] [place] [employer] - Occupational Stress Questionnaire     Feeling of Stress : Not at all   Social Connections: Moderately Integrated (1/19/2023)    Social Connection and Isolation Panel [NHANES]     Frequency of Communication with Friends and Family: Three times a week     Frequency of Social Gatherings with Friends and Family: Three times a week     Attends Christian Services: More than 4 times per year     Active Member of Clubs or Organizations: No     Attends Club or Organization Meetings: Never     Marital Status:    Housing Stability: Low Risk  (1/19/2023)    Housing Stability Vital Sign     Unable to Pay for Housing in the Last Year: No     Number of Places Lived in the Last Year: 1     Unstable Housing in the Last Year: No       Current Outpatient Medications   Medication Sig Dispense Refill    amLODIPine (NORVASC) 2.5 MG tablet Take 1 tablet by mouth once daily 90 tablet 0    artificial tears (ISOPTO TEARS) 0.5 % ophthalmic solution 1 drop as needed.      blood sugar diagnostic (BLOOD GLUCOSE TEST) Strp True Metrix glucose strips check once daily 100 each 3    donepeziL (ARICEPT) 5 MG tablet Take 1 tablet (5 mg total) by mouth every evening. 30 tablet 5    doxepin (SINEQUAN) 25 MG capsule Take 1 capsule (25 mg total) by mouth every evening. 90 capsule 3    ergocalciferol (ERGOCALCIFEROL) 50,000 unit Cap Take one capsule twice monthly. 6 capsule 3    fluticasone propionate (FLONASE) 50 mcg/actuation nasal spray 1 spray (50 mcg total) by Each Nostril route once daily. 16 g 5    gabapentin (NEURONTIN) 300 MG capsule Take 300 mg by mouth 3 (three) times daily.      ibuprofen (ADVIL,MOTRIN) 600 MG tablet Take 1 tablet (600 mg total) by mouth every 6 (six) hours as needed for Pain. 30 tablet 0    levothyroxine (SYNTHROID) 75 MCG tablet Take 1 tablet (75 mcg total) by mouth  "before breakfast. Except half a tablet on Sundays 90 tablet 3    loratadine (CLARITIN) 10 mg tablet Take 1 tablet (10 mg total) by mouth once daily. 30 tablet 5    losartan (COZAAR) 100 MG tablet Take 1 tablet (100 mg total) by mouth once daily. 90 tablet 3    rosuvastatin (CRESTOR) 10 MG tablet Take 1 tablet (10 mg total) by mouth once daily. 90 tablet 4    tiZANidine (ZANAFLEX) 2 MG tablet Take 1 tablet (2 mg total) by mouth nightly as needed (muscle tension). 30 tablet 0    blood-glucose meter kit True Metrix glucometer check glucose once daily 1 each 0    diazePAM (VALIUM) 2 MG tablet Take 1 tablet (2 mg total) by mouth nightly as needed (muscle spasm). 7 tablet 0     No current facility-administered medications for this visit.     Facility-Administered Medications Ordered in Other Visits   Medication Dose Route Frequency Provider Last Rate Last Admin    lactated ringers infusion   Intravenous Once PRN Julián Chiang MD        lactated ringers infusion   Intravenous Continuous Julián Chiang MD        LIDOcaine (PF) 10 mg/ml (1%) injection 10 mg  1 mL Intradermal Once Julián Chiang MD           Review of patient's allergies indicates:  No Known Allergies    Physical examination  Vitals Reviewed  /62 (BP Location: Right arm, Patient Position: Sitting, BP Method: Medium (Manual))   Pulse 75   Ht 5' 6" (1.676 m)   Wt 74 kg (163 lb 2.3 oz)   SpO2 98%   BMI 26.33 kg/m²  Body mass index is 26.33 kg/m².     BP Readings from Last 3 Encounters:   12/29/23 120/62   12/20/23 126/62   12/01/23 130/74       Wt Readings from Last 3 Encounters:   12/29/23 74 kg (163 lb 2.3 oz)   12/20/23 73.4 kg (161 lb 11.3 oz)   11/28/23 72.6 kg (160 lb)     Gen. Well-dressed well-nourished   Skin warm dry and intact.  No rashes noted.  Chest.  Respirations are even unlabored.  Lungs are clear to auscultation.  Cardiac regular rate and rhythm.  No chest wall adenopathy noted.  Neuro. Awake alert oriented x4.  Normal judgment and " cognition noted.  Extremities no clubbing cyanosis or edema noted.     Call or return to clinic prn if these symptoms worsen or fail to improve as anticipated.

## 2024-01-02 ENCOUNTER — TELEPHONE (OUTPATIENT)
Dept: FAMILY MEDICINE | Facility: CLINIC | Age: 79
End: 2024-01-02
Payer: MEDICARE

## 2024-01-02 NOTE — TELEPHONE ENCOUNTER
Patient says she is not doing better with memory off the Crestor so she will resume it. Appt made 1/11/24 with  to go over medications.

## 2024-01-02 NOTE — TELEPHONE ENCOUNTER
----- Message from Dinorah Cervantes, Patient Care Assistant sent at 1/2/2024  1:58 PM CST -----  Regarding: advice  Contact: pt  Type: Needs Medical Advice    Who Called:  pt     Best Call Back Number: [phone] (home)     Additional Information: pt states she would like a callback regarding levothyroxine (SYNTHROID) 75 MCG tablet and 10 days being off her medication. Please call to advise. Thanks!

## 2024-01-12 ENCOUNTER — TELEPHONE (OUTPATIENT)
Dept: FAMILY MEDICINE | Facility: CLINIC | Age: 79
End: 2024-01-12
Payer: MEDICARE

## 2024-01-12 NOTE — TELEPHONE ENCOUNTER
----- Message from Shannan Venkatleticia sent at 1/12/2024  7:42 AM CST -----  Contact: self  Type:  Same Day Appointment Request    Caller is requesting a same day appointment.  Caller declined first available appointment listed below.      Name of Caller:  patient  When is the first available appointment?  Na   Symptoms:  numbness in both feet and hands, toes and fingers noticed it today when she woke up  Best Call Back Number:  [phone]  Additional Information:   thanks

## 2024-01-12 NOTE — TELEPHONE ENCOUNTER
----- Message from Jolanta Barkley sent at 1/12/2024  8:18 AM CST -----  Regarding: Needs return call  Type: Needs Medical Advice  Who Called:  Erica Monzon Call Back Number: [phone]    Additional Information: Pt states her blood pressure is now 138/74 and pulse 73 she was needing to speak to soemone again regarding vikas gamez

## 2024-01-12 NOTE — TELEPHONE ENCOUNTER
----- Message from Narciso Fontana sent at 1/12/2024  7:56 AM CST -----  Contact: self  Type: Sooner Appointment Request        Caller is requesting a sooner appointment. Caller declined first available appointment listed below. Caller will not accept being placed on the waitlist and is requesting a message be sent to doctor.        Name of Caller: Patient   Best Call Back Number: [phone]  Additional Information: Pt states she doesn't have the tingling in her fingers anymore. Pt states her BP is 151/80. Pt states she is having a  little tingling in her toes still. Pt pulse is 79. Plz call to advise. Thanks

## 2024-01-16 ENCOUNTER — TELEPHONE (OUTPATIENT)
Dept: FAMILY MEDICINE | Facility: CLINIC | Age: 79
End: 2024-01-16
Payer: MEDICARE

## 2024-01-16 NOTE — TELEPHONE ENCOUNTER
There are rare reports of neuropathy associated with it that came out after the pre release testing.  She already has a history of hereditary and idiopathic peripheral neuropathy which is more likely the cause but she can stop the Crestor for up to two weeks.  If it has not improved by two weeks it has not the Crestor.  If it does improve I would recommend starting it back again and see if it gets worse, that would nail it down

## 2024-01-16 NOTE — TELEPHONE ENCOUNTER
----- Message from Viktoria Yepez sent at 1/16/2024 12:59 PM CST -----  Regarding: Advice  Contact: Patient  Type: Needs Medical Advice  Who Called:  Patient  Symptoms (please be specific):  Numbness and tingling  How long has patient had these symptoms:    Pharmacy name and phone #:    Best Call Back Number: [phone] (home)     Additional Information: Patient states she is still having reoccurring symptoms and wants to know if she can get off of the rosuvastatin (CRESTOR) 10 MG tablet to see if that is the cause. Please call patient to advise. Thanks!

## 2024-01-16 NOTE — TELEPHONE ENCOUNTER
Patient having numbness in both hands and feet- was happening at night but now it is happening today with tingling- gets some cramps in legs at times. Patient asking if she should stop the Rosuvastatin to see if symptoms resolve.

## 2024-01-17 ENCOUNTER — PATIENT MESSAGE (OUTPATIENT)
Dept: OTOLARYNGOLOGY | Facility: CLINIC | Age: 79
End: 2024-01-17
Payer: MEDICARE

## 2024-01-19 ENCOUNTER — HOSPITAL ENCOUNTER (OUTPATIENT)
Dept: RADIOLOGY | Facility: HOSPITAL | Age: 79
Discharge: HOME | End: 2024-01-19
Attending: FAMILY MEDICINE
Payer: MEDICARE

## 2024-01-19 ENCOUNTER — TELEPHONE (OUTPATIENT)
Dept: FAMILY MEDICINE | Facility: CLINIC | Age: 79
End: 2024-01-19
Payer: MEDICARE

## 2024-01-19 DIAGNOSIS — R92.8 ABNORMALITY OF LEFT BREAST ON SCREENING MAMMOGRAM: ICD-10-CM

## 2024-01-19 DIAGNOSIS — R92.8 ABNORMALITY OF LEFT BREAST ON SCREENING MAMMOGRAM: Primary | ICD-10-CM

## 2024-01-19 DIAGNOSIS — R92.8 ABNORMAL MAMMOGRAM: ICD-10-CM

## 2024-01-19 PROCEDURE — 77062 BREAST TOMOSYNTHESIS BI: CPT | Mod: 26,,, | Performed by: RADIOLOGY

## 2024-01-19 PROCEDURE — 77066 DX MAMMO INCL CAD BI: CPT | Mod: 26,,, | Performed by: RADIOLOGY

## 2024-01-19 PROCEDURE — 76642 ULTRASOUND BREAST LIMITED: CPT | Mod: TC,LT

## 2024-01-19 PROCEDURE — 76642 ULTRASOUND BREAST LIMITED: CPT | Mod: 26,LT,, | Performed by: RADIOLOGY

## 2024-01-19 PROCEDURE — 77062 BREAST TOMOSYNTHESIS BI: CPT | Mod: TC

## 2024-01-19 NOTE — TELEPHONE ENCOUNTER
Spoke with patient   She said she misplaced her medication bottle    She found it   She does not need help    ----- Message from Renata Finney sent at 1/19/2024  2:37 PM CST -----  Contact: pt  Type: Needs Medical Advice  Who Called: pt  Best Call Back Number: [phone]    Additional Information: Pt is calling the office regarding medication pt is on and needs to know next steps.Please call back and advise.

## 2024-01-22 ENCOUNTER — TELEPHONE (OUTPATIENT)
Dept: FAMILY MEDICINE | Facility: CLINIC | Age: 79
End: 2024-01-22
Payer: MEDICARE

## 2024-01-22 NOTE — TELEPHONE ENCOUNTER
----- Message from Saqib Chavira sent at 1/22/2024  8:02 AM CST -----  Regarding: advice  Type:  Needs Medical Advice    Who Called: pt    Best Call Back Number: [phone]      Additional Information: Pt is calling the office regarding medication pt is on and needs to know next steps. please call to discuss.

## 2024-01-22 NOTE — TELEPHONE ENCOUNTER
Spoke with patient Instructed to hold her Crestor for 2 weeks to see if any improvement with neuropathy  She will call back on 2/1/2024 with update

## 2024-01-23 ENCOUNTER — TELEPHONE (OUTPATIENT)
Dept: FAMILY MEDICINE | Facility: CLINIC | Age: 79
End: 2024-01-23
Payer: MEDICARE

## 2024-01-23 NOTE — TELEPHONE ENCOUNTER
Spoke with patient again   instructed to continue all of her medications except for Crestor.  Verbalized understanding

## 2024-01-23 NOTE — TELEPHONE ENCOUNTER
----- Message from Tierneykhalif Hamilton sent at 1/23/2024  8:14 AM CST -----  Regarding: Returning phone call  Contact: pt  Type:  Patient Returning Call    Who Called:pt    Who Left Message for Patient:kelsea    Does the patient know what this is regarding?:stopping rx    Would the patient rather a call back or a response via [facility]? Call back    Best Call Back Number:[phone]    Additional Information: pt was told to stop meds but she is not sure about stopping the tyroid rx.  Please advise.  Thank you.

## 2024-01-24 ENCOUNTER — TELEPHONE (OUTPATIENT)
Dept: FAMILY MEDICINE | Facility: CLINIC | Age: 79
End: 2024-01-24
Payer: MEDICARE

## 2024-01-24 NOTE — TELEPHONE ENCOUNTER
----- Message from Jun Valencia sent at 1/24/2024  4:17 PM CST -----  Contact: Self  Type: Needs Medical Advice  Who Called:  PT    Best Call Back Number: [phone] (home)     Additional Information: Waiting for a call back from someone about coming in tomorrow for numbness in feet. left shoulder tigtness.

## 2024-01-24 NOTE — TELEPHONE ENCOUNTER
Spoke with patient complaining of toes and fingers tingling   Requesting tomorrow  Scheduled with Hunter Montgomery NP tomorrow 1/25/2024

## 2024-01-25 ENCOUNTER — HOSPITAL ENCOUNTER (OUTPATIENT)
Dept: RADIOLOGY | Facility: CLINIC | Age: 79
Discharge: HOME | End: 2024-01-25
Attending: NURSE PRACTITIONER
Payer: MEDICARE

## 2024-01-25 ENCOUNTER — TELEPHONE (OUTPATIENT)
Dept: NEUROLOGY | Facility: CLINIC | Age: 79
End: 2024-01-25
Payer: MEDICARE

## 2024-01-25 ENCOUNTER — TELEPHONE (OUTPATIENT)
Dept: FAMILY MEDICINE | Facility: CLINIC | Age: 79
End: 2024-01-25
Payer: MEDICARE

## 2024-01-25 ENCOUNTER — OFFICE VISIT (OUTPATIENT)
Dept: FAMILY MEDICINE | Facility: CLINIC | Age: 79
End: 2024-01-25
Payer: MEDICARE

## 2024-01-25 VITALS
OXYGEN SATURATION: 95 % | BODY MASS INDEX: 26.33 KG/M2 | HEIGHT: 66 IN | SYSTOLIC BLOOD PRESSURE: 138 MMHG | DIASTOLIC BLOOD PRESSURE: 60 MMHG | WEIGHT: 163.81 LBS | HEART RATE: 65 BPM | TEMPERATURE: 97 F

## 2024-01-25 DIAGNOSIS — M25.512 LEFT SHOULDER PAIN, UNSPECIFIED CHRONICITY: ICD-10-CM

## 2024-01-25 DIAGNOSIS — R20.0 NUMBNESS AND TINGLING: Primary | ICD-10-CM

## 2024-01-25 DIAGNOSIS — R20.2 NUMBNESS AND TINGLING: Primary | ICD-10-CM

## 2024-01-25 PROCEDURE — 73030 X-RAY EXAM OF SHOULDER: CPT | Mod: 26,LT,S$GLB, | Performed by: RADIOLOGY

## 2024-01-25 PROCEDURE — 99999 PR PBB SHADOW E&M-EST. PATIENT-LVL V: CPT | Mod: PBBFAC,,, | Performed by: NURSE PRACTITIONER

## 2024-01-25 PROCEDURE — 99213 OFFICE O/P EST LOW 20 MIN: CPT | Mod: S$GLB,,, | Performed by: NURSE PRACTITIONER

## 2024-01-25 PROCEDURE — 73030 X-RAY EXAM OF SHOULDER: CPT | Mod: TC,FY,PO,LT

## 2024-01-25 RX ORDER — GABAPENTIN 100 MG/1
100 CAPSULE ORAL NIGHTLY
Qty: 30 CAPSULE | Refills: 1 | Status: SHIPPED | OUTPATIENT
Start: 2024-01-25 | End: 2024-02-05

## 2024-01-25 NOTE — TELEPHONE ENCOUNTER
----- Message from Justine Quintanilla sent at 1/25/2024  1:19 PM CST -----  Regarding: ret call  Contact: Erica [phone]  Type:  Patient Returning Call    Who Called:  Erica    Who Left Message for Patient:  Callie    Does the patient know what this is regarding?:  no    Best Call Back Number:  [phone]    Additional Information:  maybe results from blood work today

## 2024-01-25 NOTE — PROGRESS NOTES
Subjective:       Patient ID: Erica Masterson is a 78 y.o. female.    Chief Complaint: tingling and numbness in hands and feet     HPI   79 y/o female patient with medical problems listed below presents for off and on tinging and numbness in b/l hands and b/l toes for 3 weeks. Denies recent trauma to the affected area. She states has not taken gabapentin. Denies chest pain, sob, nausea, vomiting, abdominal pain, fever, chills, generalized body ache or weakness.     Patient also complains of left shoulder pain and tightness for several months. Denies recent trauma to the affected area. Denies headache, dizziness, chest pain, sob. Denies worsening symptoms.     Labs reviewed from 10/2023- A1C 5.8, Cr 1.2 with GFR 45.8, Vit D 28     Patient Active Problem List   Diagnosis    Diverticulosis    Myopathy, unspecified    Hyperlipidemia associated with type 2 diabetes mellitus, baseline     Depression    Chronic low back pain    Degenerative arthritis of knee    Hereditary and idiopathic peripheral neuropathy    Hypertension associated with diabetes    PAD (peripheral artery disease)    GERD (gastroesophageal reflux disease)    Cardiovascular event risk, ASCVD 10-year risk 13%, 2010    Hypertensive left ventricular hypertrophy, without heart failure    SI (sacroiliac) joint dysfunction    PAC (premature atrial contraction), frequent on Holter, 34%, over 33,200    Atrial septal aneurysm    Sleep disorder    History of colon polyps    Type 2 diabetes mellitus, without long-term current use of insulin, dx 3/2020    Iron deficiency anemia    RONNIE (generalized anxiety disorder), 2019    Elevated ALT measurement    Stage 3 chronic kidney disease    Biliary colic    Age-related osteoporosis without current pathological fracture    Vitamin D insufficiency    Thyroid cancer    Postoperative hypothyroidism    Spinal accessory nerve disorder    Decreased range of motion of intervertebral discs of cervical spine    Muscle spasms  of both lower extremities    Osteopenia of lumbar spine    Metastasis from thyroid cancer    Hypocalcemia    Decreased strength of upper extremity      Review of patient's allergies indicates:  No Known Allergies    Past Surgical History:   Procedure Laterality Date    BREAST BIOPSY Left 2015    benign    CHOLECYSTECTOMY      COLONOSCOPY  12/02/2015    Dr. Britton, multiple polyps, recheck five years-three years if more than 2 polyps are adenomatous    COLONOSCOPY N/A 12/02/2015    COLONOSCOPY N/A 03/20/2019    Procedure: COLONOSCOPY;  Surgeon: Jose Britton MD;  Location: [facility];  Service: Endoscopy;  Laterality: N/A;    COLONOSCOPY N/A 05/20/2020    Dr. Britton; internal hemorrhoids; diverticulosis; polyps removed; repeat in 3 years    COLONOSCOPY N/A 11/16/2023    Procedure: COLONOSCOPY(instructions mailed 11/9);  Surgeon: Jose Britton MD;  Location: [facility];  Service: Endoscopy;  Laterality: N/A;    CYSTOSCOPY N/A 08/26/2020    Procedure: CYSTOSCOPY;  Surgeon: Charlotte Walters Jr., MD;  Location: [facility] OR;  Service: Urology;  Laterality: N/A;    DILATION AND CURETTAGE OF UTERUS      DISSECTION OF NECK Left 09/13/2021    Procedure: DISSECTION, NECK;  Surgeon: Ryan Torres MD;  Location: [facility] 2ND FLR;  Service: ENT;  Laterality: Left;    EPIDURAL STEROID INJECTION INTO LUMBAR SPINE N/A 7/20/2023    Procedure: Injection-steroid-epidural-lumbar;  Surgeon: Julián Chiang MD;  Location: [facility] OR;  Service: Pain Management;  Laterality: N/A;  L5-s1    ESOPHAGEAL MANOMETRY WITH MEASUREMENT OF IMPEDANCE N/A 08/22/2022    Procedure: MANOMETRY, ESOPHAGUS, WITH IMPEDANCE MEASUREMENT;  Surgeon: Pantera Mcguire MD;  Location: [facility] (4TH FLR);  Service: Endoscopy;  Laterality: N/A;  8/4 fully vaccinated; instructions mailed-st    ESOPHAGOGASTRODUODENOSCOPY N/A 05/20/2020    Dr. Britton; small hiatal hernia; gastritis; 8 gastric polyps removed    ESOPHAGOGASTRODUODENOSCOPY N/A 04/01/2022    Procedure: EGD  (ESOPHAGOGASTRODUODENOSCOPY);  Surgeon: Jose Britton MD;  Location: [facility] ENDO;  Service: Endoscopy;  Laterality: N/A;    FOOT SURGERY      HYSTERECTOMY      TVH/BSO > 20 years    INCISION OF VULVA Bilateral 8/4/2023    Procedure: EXCISION, CYST, LABIA AND OTHER INDICATED PROCEDURES;  Surgeon: Mat Beach MD;  Location: [facility] OR;  Service: OB/GYN;  Laterality: Bilateral;  EXCYSION OF VULVAR CYST    INJECTION, SACROILIAC JOINT Right 12/1/2023    Procedure: INJECTION,SACROILIAC JOINT;  Surgeon: Julián Chiang MD;  Location: [facility] OR;  Service: Anesthesiology;  Laterality: Right;  sacroiliac injection    INTRALUMINAL GASTROINTESTINAL TRACT IMAGING VIA CAPSULE N/A 06/04/2020    Procedure: IMAGING PROCEDURE, GI TRACT, INTRALUMINAL, VIA CAPSULE;  Surgeon: Jose Britton MD;  Location: [facility] ENDO;  Service: Endoscopy;  Laterality: N/A;    KNEE SURGERY  06/06/2013    right knee tear Dr Iverson     LAPAROSCOPIC CHOLECYSTECTOMY N/A 09/17/2020    Procedure: CHOLECYSTECTOMY, LAPAROSCOPIC;  Surgeon: Forrest Veronica MD;  Location: [facility] OR;  Service: General;  Laterality: N/A;    LUNG LOBECTOMY  1966    right middle lobectomy, due to Tb    OOPHORECTOMY      SHOULDER SURGERY      francis     THYROIDECTOMY Bilateral 05/19/2021    Procedure: THYROIDECTOMY;  Surgeon: Jamia Amezquita MD;  Location: [address];  Service: General;  Laterality: Bilateral;    THYROIDECTOMY Bilateral 09/13/2021    Procedure: THYROIDECTOMY WITH INTRA-OP PTH;  Surgeon: Ryan Torres MD;  Location: [facility] OR [address] [place] [address];  Service: ENT;  Laterality: Bilateral;  NIM tube  Intraop PTH        Current Outpatient Medications:     amLODIPine (NORVASC) 2.5 MG tablet, Take 1 tablet by mouth once daily, Disp: 90 tablet, Rfl: 0    artificial tears (ISOPTO TEARS) 0.5 % ophthalmic solution, 1 drop as needed., Disp: , Rfl:     blood sugar diagnostic (BLOOD GLUCOSE TEST) Strp, True Metrix glucose strips check once daily, Disp: 100 each, Rfl: 3    diazePAM  (VALIUM) 2 MG tablet, Take 1 tablet (2 mg total) by mouth nightly as needed (muscle spasm)., Disp: 7 tablet, Rfl: 0    donepeziL (ARICEPT) 5 MG tablet, Take 1 tablet (5 mg total) by mouth every evening., Disp: 30 tablet, Rfl: 5    doxepin (SINEQUAN) 25 MG capsule, Take 1 capsule (25 mg total) by mouth every evening., Disp: 90 capsule, Rfl: 3    ergocalciferol (ERGOCALCIFEROL) 50,000 unit Cap, Take one capsule twice monthly., Disp: 6 capsule, Rfl: 3    fluticasone propionate (FLONASE) 50 mcg/actuation nasal spray, 1 spray (50 mcg total) by Each Nostril route once daily., Disp: 16 g, Rfl: 5    ibuprofen (ADVIL,MOTRIN) 600 MG tablet, Take 1 tablet (600 mg total) by mouth every 6 (six) hours as needed for Pain., Disp: 30 tablet, Rfl: 0    levothyroxine (SYNTHROID) 75 MCG tablet, Take 1 tablet (75 mcg total) by mouth before breakfast. Except half a tablet on Sundays, Disp: 90 tablet, Rfl: 3    loratadine (CLARITIN) 10 mg tablet, Take 1 tablet (10 mg total) by mouth once daily., Disp: 30 tablet, Rfl: 5    losartan (COZAAR) 100 MG tablet, Take 1 tablet (100 mg total) by mouth once daily., Disp: 90 tablet, Rfl: 3    tiZANidine (ZANAFLEX) 2 MG tablet, Take 1 tablet (2 mg total) by mouth nightly as needed (muscle tension)., Disp: 30 tablet, Rfl: 0    blood-glucose meter kit, True Metrix glucometer check glucose once daily, Disp: 1 each, Rfl: 0    gabapentin (NEURONTIN) 100 MG capsule, Take 1 capsule (100 mg total) by mouth every evening., Disp: 30 capsule, Rfl: 1    rosuvastatin (CRESTOR) 10 MG tablet, Take 1 tablet (10 mg total) by mouth once daily. (Patient not taking: Reported on 1/25/2024), Disp: 90 tablet, Rfl: 4  No current facility-administered medications for this visit.    Facility-Administered Medications Ordered in Other Visits:     lactated ringers infusion, , Intravenous, Once PRN, Julián Chiang MD    lactated ringers infusion, , Intravenous, Continuous, Julián Chiang MD    LIDOcaine (PF) 10 mg/ml (1%) injection 10  "mg, 1 mL, Intradermal, Once, VuJulián MD    Review of Systems   Constitutional:  Negative for chills and fever.   Respiratory:  Negative for cough, chest tightness and shortness of breath.    Cardiovascular:  Negative for chest pain and palpitations.   Gastrointestinal:  Negative for abdominal pain.   Musculoskeletal:         Left shoulder pain    Neurological:  Positive for numbness. Negative for dizziness and headaches.       Objective:   /60 (BP Location: Right arm, Patient Position: Sitting, BP Method: Medium (Manual))   Pulse 65   Temp 97.4 °F (36.3 °C) (Oral)   Ht 5' 6" (1.676 m)   Wt 74.3 kg (163 lb 12.8 oz)   SpO2 95%   BMI 26.44 kg/m²         Physical Exam  Constitutional:       General: She is not in acute distress.     Appearance: Normal appearance.   HENT:      Head: Atraumatic.   Cardiovascular:      Rate and Rhythm: Normal rate and regular rhythm.      Pulses: Normal pulses.      Heart sounds: Normal heart sounds.   Pulmonary:      Effort: Pulmonary effort is normal.      Breath sounds: Normal breath sounds.   Abdominal:      General: Abdomen is flat. Bowel sounds are normal.      Palpations: Abdomen is soft.      Tenderness: There is no abdominal tenderness.   Neurological:      General: No focal deficit present.      Mental Status: She is oriented to person, place, and time.      Sensory: Sensation is intact. No sensory deficit.         Assessment:       1. Numbness and tingling    2. Left shoulder pain, unspecified chronicity        Plan:       1. Numbness and tingling  Likely peripheral neuropathy vs other etiology   - CBC Auto Differential; Future  - Comprehensive Metabolic Panel; Future  - Folate; Future  - Vitamin B12; Future  - Magnesium; Future  - EMG W/ ULTRASOUND AND NERVE CONDUCTION TEST 4 Extremities; Future  - Ambulatory referral/consult to Neurology; Future  - VITAMIN B1; Future  - gabapentin (NEURONTIN) 100 MG capsule; Take 1 capsule (100 mg total) by mouth every " evening.  Dispense: 30 capsule; Refill: 1  - Fall precautions discussed     2. Left shoulder pain, unspecified chronicity  - X-Ray Shoulder 2 or More Views Left; Future     Patient with be reevaluated in  as scheduled  or sooner chuy Camargo NP

## 2024-01-25 NOTE — TELEPHONE ENCOUNTER
Spoke with patient to inform her of the time an date for the EMG appt. A letter will also be mailed out.  If she has any questions she can call to discuss. Patient agreed and understood.

## 2024-01-26 ENCOUNTER — TELEPHONE (OUTPATIENT)
Dept: FAMILY MEDICINE | Facility: CLINIC | Age: 79
End: 2024-01-26
Payer: MEDICARE

## 2024-01-26 NOTE — TELEPHONE ENCOUNTER
Spoke to patient   Notified her labs are pending.   She will keep scheduled appointment on 1/30/2024 with Melissa Nguyen

## 2024-01-26 NOTE — TELEPHONE ENCOUNTER
----- Message from Angelia Jeffries sent at 1/26/2024  8:02 AM CST -----  Contact: Patient  Type:  Patient Returning Call    Who Called:  Patientg  Who Left Message for Patient:   Shefali  Does the patient know what this is regarding?:   Not sure    Would the patient rather a call back or a response via [employer]?   Call back  Best Call Back Number:  [phone] until 9 AM / [phone] after 9 AM    Additional Information:     States she would like Shefali to call her back - states she's not sure of message - please call - thank you

## 2024-01-29 DIAGNOSIS — E51.9 THIAMINE DEFICIENCY: Primary | ICD-10-CM

## 2024-01-29 RX ORDER — LANOLIN ALCOHOL/MO/W.PET/CERES
100 CREAM (GRAM) TOPICAL DAILY
Qty: 30 TABLET | Refills: 5 | Status: SHIPPED | OUTPATIENT
Start: 2024-01-29

## 2024-01-30 ENCOUNTER — HOSPITAL ENCOUNTER (EMERGENCY)
Facility: HOSPITAL | Age: 79
Discharge: HOME | End: 2024-01-30
Attending: EMERGENCY MEDICINE
Payer: MEDICARE

## 2024-01-30 VITALS
WEIGHT: 160 LBS | HEART RATE: 66 BPM | SYSTOLIC BLOOD PRESSURE: 134 MMHG | TEMPERATURE: 98 F | OXYGEN SATURATION: 99 % | DIASTOLIC BLOOD PRESSURE: 62 MMHG | BODY MASS INDEX: 25.71 KG/M2 | RESPIRATION RATE: 18 BRPM | HEIGHT: 66 IN

## 2024-01-30 DIAGNOSIS — Z00.00 ENCOUNTER FOR MEDICARE ANNUAL WELLNESS EXAM: ICD-10-CM

## 2024-01-30 DIAGNOSIS — R20.2 PARESTHESIAS: Primary | ICD-10-CM

## 2024-01-30 LAB
ALBUMIN SERPL BCP-MCNC: 3.5 G/DL (ref 3.5–5.2)
ALP SERPL-CCNC: 38 U/L (ref 55–135)
ALT SERPL W/O P-5'-P-CCNC: 15 U/L (ref 10–44)
ANION GAP SERPL CALC-SCNC: 9 MMOL/L (ref 8–16)
AST SERPL-CCNC: 18 U/L (ref 10–40)
BILIRUB SERPL-MCNC: 0.5 MG/DL (ref 0.1–1)
BUN SERPL-MCNC: 18 MG/DL (ref 8–23)
CALCIUM SERPL-MCNC: 8.3 MG/DL (ref 8.7–10.5)
CHLORIDE SERPL-SCNC: 106 MMOL/L (ref 95–110)
CO2 SERPL-SCNC: 27 MMOL/L (ref 23–29)
CREAT SERPL-MCNC: 1.2 MG/DL (ref 0.5–1.4)
EST. GFR  (NO RACE VARIABLE): 46 ML/MIN/1.73 M^2
GLUCOSE SERPL-MCNC: 117 MG/DL (ref 70–110)
POTASSIUM SERPL-SCNC: 4.5 MMOL/L (ref 3.5–5.1)
PROT SERPL-MCNC: 6.9 G/DL (ref 6–8.4)
SODIUM SERPL-SCNC: 142 MMOL/L (ref 136–145)

## 2024-01-30 PROCEDURE — 99283 EMERGENCY DEPT VISIT LOW MDM: CPT

## 2024-01-30 PROCEDURE — 80053 COMPREHEN METABOLIC PANEL: CPT | Performed by: EMERGENCY MEDICINE

## 2024-01-30 PROCEDURE — 36415 COLL VENOUS BLD VENIPUNCTURE: CPT | Performed by: EMERGENCY MEDICINE

## 2024-01-30 NOTE — ED TRIAGE NOTES
Erica Masterson is here with numbness and tingling to hands, feet, face and neck, started on Sunday and numbness went away and came back.  
color consistent with ethnicity/race

## 2024-01-30 NOTE — ED PROVIDER NOTES
Encounter Date: 1/30/2024       History     Chief Complaint   Patient presents with    Numbness     To hands, feet, face and neck     78-year-old female with a history of anxiety hypertension presents to the ER with tingling to the hands, feet, face around the mouth that began about an hour ago.  Symptoms are improved.  She states she had these same symptoms several years ago.  Review of the medical records demonstrates multiple recent telephone calls regarding tingling.  No facial droop no slurred speech no focal weakness.  Symptoms are much better at this time.  No weakness.  Triage note states numbness but patient describes her symptoms as a tingling sensation.    The history is provided by the patient.     Review of patient's allergies indicates:  No Known Allergies  Past Medical History:   Diagnosis Date    Adenomatous polyp of colon 3/26/2012    Allergy     Anxiety     Cancer     Cataract     Colon polyp     Degenerative arthritis of knee 04/03/2012    Diverticulosis     Encounter for blood transfusion     GERD (gastroesophageal reflux disease)     History of thyroid cancer 2021    Hyperlipidemia     Hypertension     Lateral meniscal tear 5/20/2013    Multinodular goiter 03/26/2012    Myopathy, unspecified 01/18/2010    Tuberculosis      Past Surgical History:   Procedure Laterality Date    BREAST BIOPSY Left 2015    benign    CHOLECYSTECTOMY      COLONOSCOPY  12/02/2015    Dr. Britton, multiple polyps, recheck five years-three years if more than 2 polyps are adenomatous    COLONOSCOPY N/A 12/02/2015    COLONOSCOPY N/A 03/20/2019    Procedure: COLONOSCOPY;  Surgeon: Jose Britton MD;  Location: [facility];  Service: Endoscopy;  Laterality: N/A;    COLONOSCOPY N/A 05/20/2020    Dr. Britton; internal hemorrhoids; diverticulosis; polyps removed; repeat in 3 years    COLONOSCOPY N/A 11/16/2023    Procedure: COLONOSCOPY(instructions mailed 11/9);  Surgeon: Jose Britton MD;  Location: [facility] [place] [place];  Service:  Endoscopy;  Laterality: N/A;    CYSTOSCOPY N/A 08/26/2020    Procedure: CYSTOSCOPY;  Surgeon: Charlotte Walters Jr., MD;  Location: [facility] OR;  Service: Urology;  Laterality: N/A;    DILATION AND CURETTAGE OF UTERUS      DISSECTION OF NECK Left 09/13/2021    Procedure: DISSECTION, NECK;  Surgeon: Ryan Torres MD;  Location: [facility] 2ND FLR;  Service: ENT;  Laterality: Left;    EPIDURAL STEROID INJECTION INTO LUMBAR SPINE N/A 7/20/2023    Procedure: Injection-steroid-epidural-lumbar;  Surgeon: Julián Chiang MD;  Location: [facility] OR;  Service: Pain Management;  Laterality: N/A;  L5-s1    ESOPHAGEAL MANOMETRY WITH MEASUREMENT OF IMPEDANCE N/A 08/22/2022    Procedure: MANOMETRY, ESOPHAGUS, WITH IMPEDANCE MEASUREMENT;  Surgeon: Pantera Mcguire MD;  Location: [facility] (4TH FLR);  Service: Endoscopy;  Laterality: N/A;  8/4 fully vaccinated; instructions mailed-st    ESOPHAGOGASTRODUODENOSCOPY N/A 05/20/2020    Dr. Britton; small hiatal hernia; gastritis; 8 gastric polyps removed    ESOPHAGOGASTRODUODENOSCOPY N/A 04/01/2022    Procedure: EGD (ESOPHAGOGASTRODUODENOSCOPY);  Surgeon: Jose Britton MD;  Location: [facility];  Service: Endoscopy;  Laterality: N/A;    FOOT SURGERY      HYSTERECTOMY      TVH/BSO > 20 years    INCISION OF VULVA Bilateral 8/4/2023    Procedure: EXCISION, CYST, LABIA AND OTHER INDICATED PROCEDURES;  Surgeon: Mat Beach MD;  Location: [facility] OR;  Service: OB/GYN;  Laterality: Bilateral;  EXCYSION OF VULVAR CYST    INJECTION, SACROILIAC JOINT Right 12/1/2023    Procedure: INJECTION,SACROILIAC JOINT;  Surgeon: Julián Chiang MD;  Location: [facility] OR;  Service: Anesthesiology;  Laterality: Right;  sacroiliac injection    INTRALUMINAL GASTROINTESTINAL TRACT IMAGING VIA CAPSULE N/A 06/04/2020    Procedure: IMAGING PROCEDURE, GI TRACT, INTRALUMINAL, VIA CAPSULE;  Surgeon: Jose Britton MD;  Location: [facility];  Service: Endoscopy;  Laterality: N/A;    KNEE SURGERY  06/06/2013    right knee tear   Iverson     LAPAROSCOPIC CHOLECYSTECTOMY N/A 09/17/2020    Procedure: CHOLECYSTECTOMY, LAPAROSCOPIC;  Surgeon: Forrest Veronica MD;  Location: [facility] OR;  Service: General;  Laterality: N/A;    LUNG LOBECTOMY  1966    right middle lobectomy, due to Tb    OOPHORECTOMY      SHOULDER SURGERY      francis     THYROIDECTOMY Bilateral 05/19/2021    Procedure: THYROIDECTOMY;  Surgeon: Jamia Amezquita MD;  Location: [facility] OR 2ND FLR;  Service: General;  Laterality: Bilateral;    THYROIDECTOMY Bilateral 09/13/2021    Procedure: THYROIDECTOMY WITH INTRA-OP PTH;  Surgeon: Ryan Torres MD;  Location: [facility] OR 2ND FLR;  Service: ENT;  Laterality: Bilateral;  NIM tube  Intraop PTH     Family History   Problem Relation Age of Onset    Colon cancer Other     Cancer Mother     Hypertension Mother     Hyperlipidemia Mother     Cancer Brother     Hypertension Father     Emphysema Father     Diabetes Son     Hypertension Son     Alcohol abuse Son     Diabetes Maternal Aunt     Alzheimer's disease Maternal Uncle     Cancer Maternal Grandmother     No Known Problems Daughter     Dementia Sister     Alzheimer's disease Sister     Breast cancer Sister 60    Obesity Paternal Uncle     Prostate cancer Other     Cancer Brother     Melanoma Neg Hx     Psoriasis Neg Hx     Lupus Neg Hx     Eczema Neg Hx     Amblyopia Neg Hx     Blindness Neg Hx     Cataracts Neg Hx     Glaucoma Neg Hx     Macular degeneration Neg Hx     Retinal detachment Neg Hx     Strabismus Neg Hx     Stroke Neg Hx     Thyroid cancer Neg Hx     Colon polyps Neg Hx     Ulcerative colitis Neg Hx     Stomach cancer Neg Hx     Rectal cancer Neg Hx      Social History     Tobacco Use    Smoking status: Never    Smokeless tobacco: Never   Substance Use Topics    Alcohol use: Not Currently     Comment: stopped 2019    Drug use: No     Review of Systems   Neurological:         Tingling diffusely       Physical Exam     Initial Vitals [01/30/24 0419]   BP Pulse Resp Temp SpO2    (!) 142/69 77 18 98.2 °F (36.8 °C) 98 %      MAP       --         Physical Exam    Nursing note and vitals reviewed.  Constitutional: She appears well-developed and well-nourished.   HENT:   Head: Normocephalic and atraumatic.   Eyes: EOM are normal.   Neck: Neck supple.   Normal range of motion.  Cardiovascular:  Normal rate, regular rhythm and normal heart sounds.     Exam reveals no gallop and no friction rub.       No murmur heard.  Pulmonary/Chest: Breath sounds normal. No respiratory distress. She has no wheezes. She has no rhonchi. She has no rales.   Musculoskeletal:         General: Normal range of motion.      Cervical back: Normal range of motion and neck supple.     Neurological: She is alert and oriented to person, place, and time.   Equal  strength bilaterally no slurred speech   Skin: Skin is warm and dry.   Psychiatric: She has a normal mood and affect. Her behavior is normal. Judgment and thought content normal.         ED Course   Procedures  Labs Reviewed   COMPREHENSIVE METABOLIC PANEL - Abnormal; Notable for the following components:       Result Value    Glucose 117 (*)     Calcium 8.3 (*)     Alkaline Phosphatase 38 (*)     eGFR 46 (*)     All other components within normal limits          Imaging Results    None          Medications - No data to display  Medical Decision Making  78-year-old female history of anxiety and hypertension presents with diffuse tingling that began tonight.  She reports tingling to hands, feet, face and neck.  Triage note states numbness but there is no numbness on exam.  There is no weakness and no facial droop or focal neurologic finding on exam.  A review of the medical records demonstrates numerous recent phone calls for paresthesias.  She has established outpatient follow-up for this.  Metabolic panel tonight does not demonstrate any significant findings she can be discharged home.  Her symptoms have resolved at this time.    Amount and/or Complexity of  Data Reviewed  External Data Reviewed: notes.  Labs: ordered.               ED Course as of 01/30/24 0555   Tue Jan 30, 2024 0426 BP(!): 142/69 [EF]   0426 Temp: 98.2 °F (36.8 °C) [EF]   0426 Temp Source: Oral [EF]   0426 Pulse: 77 [EF]   0426 Resp: 18 [EF]   0426 SpO2: 98 % [EF]      ED Course User Index  [EF] Kyle Moore MD                           Clinical Impression:  Final diagnoses:  [R20.2] Paresthesias (Primary)          ED Disposition Condition    Discharge Stable          ED Prescriptions    None       Follow-up Information       Follow up With Specialties Details Why Contact Info Additional Information    [facility] Emergency Medicine  If symptoms worsen, As needed [address] [place] [address] [address] [place] [id number] 1st floor    Keep your scheduled follow-up for these symptoms                  Kyle Moore MD  01/30/24 0513

## 2024-01-31 ENCOUNTER — HOSPITAL ENCOUNTER (OUTPATIENT)
Dept: RADIOLOGY | Facility: HOSPITAL | Age: 79
Discharge: HOME | End: 2024-01-31
Attending: ORTHOPAEDIC SURGERY
Payer: MEDICARE

## 2024-01-31 ENCOUNTER — OFFICE VISIT (OUTPATIENT)
Dept: ORTHOPEDICS | Facility: CLINIC | Age: 79
End: 2024-01-31
Payer: MEDICARE

## 2024-01-31 VITALS — WEIGHT: 160.06 LBS | RESPIRATION RATE: 16 BRPM | HEIGHT: 66 IN | BODY MASS INDEX: 25.72 KG/M2

## 2024-01-31 DIAGNOSIS — M79.671 PAIN IN BOTH FEET: ICD-10-CM

## 2024-01-31 DIAGNOSIS — M79.672 PAIN IN BOTH FEET: ICD-10-CM

## 2024-01-31 DIAGNOSIS — M25.512 LEFT SHOULDER PAIN, UNSPECIFIED CHRONICITY: ICD-10-CM

## 2024-01-31 DIAGNOSIS — M79.671 PAIN IN BOTH FEET: Primary | ICD-10-CM

## 2024-01-31 DIAGNOSIS — M79.672 PAIN IN BOTH FEET: Primary | ICD-10-CM

## 2024-01-31 PROCEDURE — 20610 DRAIN/INJ JOINT/BURSA W/O US: CPT | Mod: LT,S$GLB,, | Performed by: ORTHOPAEDIC SURGERY

## 2024-01-31 PROCEDURE — 99204 OFFICE O/P NEW MOD 45 MIN: CPT | Mod: 25,S$GLB,, | Performed by: ORTHOPAEDIC SURGERY

## 2024-01-31 PROCEDURE — 99999 PR PBB SHADOW E&M-EST. PATIENT-LVL IV: CPT | Mod: PBBFAC,,, | Performed by: ORTHOPAEDIC SURGERY

## 2024-01-31 RX ORDER — TRIAMCINOLONE ACETONIDE 40 MG/ML
40 INJECTION, SUSPENSION INTRA-ARTICULAR; INTRAMUSCULAR
Status: DISCONTINUED | OUTPATIENT
Start: 2024-01-31 | End: 2024-01-31 | Stop reason: HOSPADM

## 2024-01-31 RX ADMIN — TRIAMCINOLONE ACETONIDE 40 MG: 40 INJECTION, SUSPENSION INTRA-ARTICULAR; INTRAMUSCULAR at 01:01

## 2024-01-31 NOTE — PROGRESS NOTES
Subjective:      Patient ID: Erica Masterson is a 78 y.o. female.    Chief Complaint: Pain and Numbness of the Right Foot and Pain and Numbness of the Left Foot    HPI  78-year-old female with a several year history of left shoulder pain.  She relates this starting as a burning pain and stiffness in her left shoulder after some thyroid surgery in 2018.  She has had some therapy for her shoulder without much improvement.  She is complaining of pain lying on her left side pain at night and pain with overhead lifting she has been somewhat progressive over the last year or so.  ROS      Objective:    Ortho Exam     Constitutional:   Patient is alert  and oriented in no acute distress  HEENT:  normocephalic atraumatic; PERRL EOMI  Neck:  Supple without adenopathy  Cardiovascular:  Normal rate and rhythm  Pulmonary:  Normal respiratory effort normal chest wall expansion  Abdominal:  Nonprotuberant nondistended  Musculoskeletal:  Patient has a steady nonantalgic gait.  Neck is supple with adequate range of motion  No Lhermitte's or Spurling sign.  Adequate range of motion of the left shoulder with discomfort with full forward flexion and internal rotation.  There is a mildly positive impingement sign.  Negative Yergason's and Speed's testing.  No gross weakness however pain with strength testing against resistance there is no swelling mass or atrophy noted.  There is a normal distal neurologic and vascular examination.  Neurological:  No focal defect; cranial nerves 2-12 grossly intact  Psychiatric/behavioral:  Mood and behavior normal    X-Ray Shoulder 2 or More Views Left  Narrative: EXAMINATION:  XR SHOULDER COMPLETE 2 OR MORE VIEWS LEFT    CLINICAL HISTORY:  Pain in left shoulder    TECHNIQUE:  Two or three views of the left shoulder were performed.    COMPARISON:  Left shoulder-12/02/2021    FINDINGS:  The bones are osteopenic.  There is moderate hypertrophic degenerative osteoarthritis of the left acromioclavicular  joint.  No radiographically evident acute, displaced fracture, dislocation, or osseous destructive process.  No rotator cuff calcific tendinitis.  The left chest is clear.  There is atherosclerotic calcification present within the thoracic aortic arch.  Impression: As above    Electronically signed by: Shaji Seay MD  Date:    01/25/2024  Time:    09:03       My Radiographs Findings:    I have personally reviewed radiographs and concur with above findings    Assessment:       Encounter Diagnosis   Name Primary?    Left shoulder pain, unspecified chronicity          Plan:       I have discussed medical condition treatment options with her at length.  After a verbal consent and sterile prep I injected her left shoulder today without complication with a combination of cc of Kenalog several cc of lidocaine.  I have instructed her to continue with rehab exercises shown in therapy.  NSAIDs as if approved by her PCP.  Follow up in 6 weeks if symptoms fail to improve may need to consider MRI.  I will see her sooner if any questions or problems        Past Medical History:   Diagnosis Date    Adenomatous polyp of colon 3/26/2012    Allergy     Anxiety     Cancer     Cataract     Colon polyp     Degenerative arthritis of knee 04/03/2012    Diverticulosis     Encounter for blood transfusion     GERD (gastroesophageal reflux disease)     History of thyroid cancer 2021    Hyperlipidemia     Hypertension     Lateral meniscal tear 5/20/2013    Multinodular goiter 03/26/2012    Myopathy, unspecified 01/18/2010    Tuberculosis      Past Surgical History:   Procedure Laterality Date    BREAST BIOPSY Left 2015    benign    CHOLECYSTECTOMY      COLONOSCOPY  12/02/2015    Dr. Britton, multiple polyps, recheck five years-three years if more than 2 polyps are adenomatous    COLONOSCOPY N/A 12/02/2015    COLONOSCOPY N/A 03/20/2019    Procedure: COLONOSCOPY;  Surgeon: Jose Britton MD;  Location: [facility];  Service: Endoscopy;   Laterality: N/A;    COLONOSCOPY N/A 05/20/2020    Dr. Britton; internal hemorrhoids; diverticulosis; polyps removed; repeat in 3 years    COLONOSCOPY N/A 11/16/2023    Procedure: COLONOSCOPY(instructions mailed 11/9);  Surgeon: Jose Britton MD;  Location: [facility];  Service: Endoscopy;  Laterality: N/A;    CYSTOSCOPY N/A 08/26/2020    Procedure: CYSTOSCOPY;  Surgeon: Charlotte Walters Jr., MD;  Location: [facility];  Service: Urology;  Laterality: N/A;    DILATION AND CURETTAGE OF UTERUS      DISSECTION OF NECK Left 09/13/2021    Procedure: DISSECTION, NECK;  Surgeon: Ryan Torres MD;  Location: [facility] 2ND FLR;  Service: ENT;  Laterality: Left;    EPIDURAL STEROID INJECTION INTO LUMBAR SPINE N/A 7/20/2023    Procedure: Injection-steroid-epidural-lumbar;  Surgeon: Julián Chiang MD;  Location: [facility] OR;  Service: Pain Management;  Laterality: N/A;  L5-s1    ESOPHAGEAL MANOMETRY WITH MEASUREMENT OF IMPEDANCE N/A 08/22/2022    Procedure: MANOMETRY, ESOPHAGUS, WITH IMPEDANCE MEASUREMENT;  Surgeon: Pantera Mcguire MD;  Location: [facility] (4TH FLR);  Service: Endoscopy;  Laterality: N/A;  8/4 fully vaccinated; instructions mailed-    ESOPHAGOGASTRODUODENOSCOPY N/A 05/20/2020    Dr. Britton; small hiatal hernia; gastritis; 8 gastric polyps removed    ESOPHAGOGASTRODUODENOSCOPY N/A 04/01/2022    Procedure: EGD (ESOPHAGOGASTRODUODENOSCOPY);  Surgeon: Jose Britton MD;  Location: [facility];  Service: Endoscopy;  Laterality: N/A;    FOOT SURGERY      HYSTERECTOMY      TVH/BSO > 20 years    INCISION OF VULVA Bilateral 8/4/2023    Procedure: EXCISION, CYST, LABIA AND OTHER INDICATED PROCEDURES;  Surgeon: Mat Beach MD;  Location: [facility];  Service: OB/GYN;  Laterality: Bilateral;  EXCYSION OF VULVAR CYST    INJECTION, SACROILIAC JOINT Right 12/1/2023    Procedure: INJECTION,SACROILIAC JOINT;  Surgeon: Julián Chiang MD;  Location: [facility] OR;  Service: Anesthesiology;  Laterality: Right;  sacroiliac injection     INTRALUMINAL GASTROINTESTINAL TRACT IMAGING VIA CAPSULE N/A 06/04/2020    Procedure: IMAGING PROCEDURE, GI TRACT, INTRALUMINAL, VIA CAPSULE;  Surgeon: Jose Britton MD;  Location: [facility] ENDO;  Service: Endoscopy;  Laterality: N/A;    KNEE SURGERY  06/06/2013    right knee tear Dr Iverson     LAPAROSCOPIC CHOLECYSTECTOMY N/A 09/17/2020    Procedure: CHOLECYSTECTOMY, LAPAROSCOPIC;  Surgeon: Forrest Veronica MD;  Location: [facility] OR;  Service: General;  Laterality: N/A;    LUNG LOBECTOMY  1966    right middle lobectomy, due to Tb    OOPHORECTOMY      SHOULDER SURGERY      francis     THYROIDECTOMY Bilateral 05/19/2021    Procedure: THYROIDECTOMY;  Surgeon: Jamia Amezquita MD;  [facility];  Service: General;  Laterality: Bilateral;    THYROIDECTOMY Bilateral 09/13/2021    Procedure: THYROIDECTOMY WITH INTRA-OP PTH;  Surgeon: Ryan Torres MD;  Location: [facility] OR 2ND FLR;  Service: ENT;  Laterality: Bilateral;  NIM tube  Intraop PTH         Current Outpatient Medications:     amLODIPine (NORVASC) 2.5 MG tablet, Take 1 tablet by mouth once daily, Disp: 90 tablet, Rfl: 0    artificial tears (ISOPTO TEARS) 0.5 % ophthalmic solution, 1 drop as needed., Disp: , Rfl:     blood sugar diagnostic (BLOOD GLUCOSE TEST) Strp, True Metrix glucose strips check once daily, Disp: 100 each, Rfl: 3    blood-glucose meter kit, True Metrix glucometer check glucose once daily, Disp: 1 each, Rfl: 0    diazePAM (VALIUM) 2 MG tablet, Take 1 tablet (2 mg total) by mouth nightly as needed (muscle spasm)., Disp: 7 tablet, Rfl: 0    donepeziL (ARICEPT) 5 MG tablet, Take 1 tablet (5 mg total) by mouth every evening., Disp: 30 tablet, Rfl: 5    doxepin (SINEQUAN) 25 MG capsule, Take 1 capsule (25 mg total) by mouth every evening., Disp: 90 capsule, Rfl: 3    ergocalciferol (ERGOCALCIFEROL) 50,000 unit Cap, Take one capsule twice monthly., Disp: 6 capsule, Rfl: 3    fluticasone propionate (FLONASE) 50 mcg/actuation nasal spray, 1  spray (50 mcg total) by Each Nostril route once daily., Disp: 16 g, Rfl: 5    gabapentin (NEURONTIN) 100 MG capsule, Take 1 capsule (100 mg total) by mouth every evening., Disp: 30 capsule, Rfl: 1    ibuprofen (ADVIL,MOTRIN) 600 MG tablet, Take 1 tablet (600 mg total) by mouth every 6 (six) hours as needed for Pain., Disp: 30 tablet, Rfl: 0    levothyroxine (SYNTHROID) 75 MCG tablet, Take 1 tablet (75 mcg total) by mouth before breakfast. Except half a tablet on Sundays, Disp: 90 tablet, Rfl: 3    loratadine (CLARITIN) 10 mg tablet, Take 1 tablet (10 mg total) by mouth once daily., Disp: 30 tablet, Rfl: 5    losartan (COZAAR) 100 MG tablet, Take 1 tablet (100 mg total) by mouth once daily., Disp: 90 tablet, Rfl: 3    rosuvastatin (CRESTOR) 10 MG tablet, Take 1 tablet (10 mg total) by mouth once daily. (Patient not taking: Reported on 1/25/2024), Disp: 90 tablet, Rfl: 4    thiamine 100 MG tablet, Take 1 tablet (100 mg total) by mouth once daily., Disp: 30 tablet, Rfl: 5    tiZANidine (ZANAFLEX) 2 MG tablet, Take 1 tablet (2 mg total) by mouth nightly as needed (muscle tension)., Disp: 30 tablet, Rfl: 0  No current facility-administered medications for this visit.    Facility-Administered Medications Ordered in Other Visits:     lactated ringers infusion, , Intravenous, Once PRN, Julián Chiang MD    lactated ringers infusion, , Intravenous, Continuous, Julián Chiang MD    LIDOcaine (PF) 10 mg/ml (1%) injection 10 mg, 1 mL, Intradermal, Once, Julián Chiang MD    Review of patient's allergies indicates:  No Known Allergies    Family History   Problem Relation Age of Onset    Colon cancer Other     Cancer Mother     Hypertension Mother     Hyperlipidemia Mother     Cancer Brother     Hypertension Father     Emphysema Father     Diabetes Son     Hypertension Son     Alcohol abuse Son     Diabetes Maternal Aunt     Alzheimer's disease Maternal Uncle     Cancer Maternal Grandmother     No Known Problems Daughter     Dementia  Sister     Alzheimer's disease Sister     Breast cancer Sister 60    Obesity Paternal Uncle     Prostate cancer Other     Cancer Brother     Melanoma Neg Hx     Psoriasis Neg Hx     Lupus Neg Hx     Eczema Neg Hx     Amblyopia Neg Hx     Blindness Neg Hx     Cataracts Neg Hx     Glaucoma Neg Hx     Macular degeneration Neg Hx     Retinal detachment Neg Hx     Strabismus Neg Hx     Stroke Neg Hx     Thyroid cancer Neg Hx     Colon polyps Neg Hx     Ulcerative colitis Neg Hx     Stomach cancer Neg Hx     Rectal cancer Neg Hx      Social History     Occupational History    Not on file   Tobacco Use    Smoking status: Never    Smokeless tobacco: Never   Substance and Sexual Activity    Alcohol use: Not Currently     Comment: stopped 2019    Drug use: No    Sexual activity: Not Currently

## 2024-01-31 NOTE — PROCEDURES
Large Joint Aspiration/Injection: L subacromial bursa    Date/Time: 1/31/2024 1:30 PM    Performed by: Jacinto Palm MD  Authorized by: Jacinto Palm MD    Consent Done?:  Yes (Verbal)  Indications:  Pain  Site marked: the procedure site was marked    Timeout: prior to procedure the correct patient, procedure, and site was verified      Local anesthesia used?: Yes    Local anesthetic: Ropivicaine.  Anesthetic total (ml):  3      Details:  Needle Size:  22 G  Ultrasonic Guidance for needle placement?: No    Approach:  Posterior  Location:  Shoulder  Site:  L subacromial bursa  Medications:  40 mg triamcinolone acetonide 40 mg/mL  Patient tolerance:  Patient tolerated the procedure well with no immediate complications

## 2024-02-01 NOTE — TELEPHONE ENCOUNTER
----- Message from Carmenza Ragsdale sent at 2/11/2022 11:29 AM CST -----  Contact: pt  Type:  Patient Returning Call    Who Called:  pt   Who Left Message for Patient:  Matteo  Does the patient know what this is regarding?:  UA results  Best Call Back Number:  [phone] or [phone]    Additional Information:          used

## 2024-02-05 ENCOUNTER — HOSPITAL ENCOUNTER (OUTPATIENT)
Dept: RADIOLOGY | Facility: CLINIC | Age: 79
Discharge: HOME | End: 2024-02-05
Attending: PHYSICIAN ASSISTANT
Payer: MEDICARE

## 2024-02-05 ENCOUNTER — OFFICE VISIT (OUTPATIENT)
Dept: FAMILY MEDICINE | Facility: CLINIC | Age: 79
End: 2024-02-05
Payer: MEDICARE

## 2024-02-05 ENCOUNTER — TELEPHONE (OUTPATIENT)
Dept: FAMILY MEDICINE | Facility: CLINIC | Age: 79
End: 2024-02-05

## 2024-02-05 VITALS
BODY MASS INDEX: 25.96 KG/M2 | HEART RATE: 69 BPM | WEIGHT: 161.5 LBS | TEMPERATURE: 98 F | OXYGEN SATURATION: 94 % | SYSTOLIC BLOOD PRESSURE: 120 MMHG | HEIGHT: 66 IN | DIASTOLIC BLOOD PRESSURE: 60 MMHG

## 2024-02-05 DIAGNOSIS — G62.9 NEUROPATHY: ICD-10-CM

## 2024-02-05 DIAGNOSIS — G62.9 NEUROPATHY: Primary | ICD-10-CM

## 2024-02-05 DIAGNOSIS — R20.0 NUMBNESS AND TINGLING: ICD-10-CM

## 2024-02-05 DIAGNOSIS — M54.2 NECK PAIN: ICD-10-CM

## 2024-02-05 DIAGNOSIS — R20.2 NUMBNESS AND TINGLING: ICD-10-CM

## 2024-02-05 PROCEDURE — 72050 X-RAY EXAM NECK SPINE 4/5VWS: CPT | Mod: TC,FY,PO

## 2024-02-05 PROCEDURE — 99213 OFFICE O/P EST LOW 20 MIN: CPT | Mod: S$GLB,,, | Performed by: PHYSICIAN ASSISTANT

## 2024-02-05 PROCEDURE — 99999 PR PBB SHADOW E&M-EST. PATIENT-LVL III: CPT | Mod: PBBFAC,,, | Performed by: PHYSICIAN ASSISTANT

## 2024-02-05 PROCEDURE — 72050 X-RAY EXAM NECK SPINE 4/5VWS: CPT | Mod: 26,,, | Performed by: RADIOLOGY

## 2024-02-05 RX ORDER — GABAPENTIN 100 MG/1
200 CAPSULE ORAL NIGHTLY
Qty: 60 CAPSULE | Refills: 1
Start: 2024-02-05 | End: 2024-02-12 | Stop reason: SDUPTHER

## 2024-02-05 NOTE — TELEPHONE ENCOUNTER
----- Message from Jun Valencia sent at 2/5/2024  4:24 PM CST -----  Contact: Self  Type: Needs Medical Advice  Who Called:  PT    Best Call Back Number: [phone] (home)     Additional Information: Has questions regarding meds she is supposed to be taking.

## 2024-02-05 NOTE — TELEPHONE ENCOUNTER
----- Message from Renata Finney sent at 2/5/2024  3:22 PM CST -----  Type: Needs Medical Advice  Who Called:  pt  Best Call Back Number: [phone]    Additional Information: Pt iscalling the office trying to see if she should start back talking rovastatin she had not been taking it.Please call back and advise.

## 2024-02-05 NOTE — TELEPHONE ENCOUNTER
Spoke with patient she states that she has been off the rosuvastatin since 12/21/23 and not seen an improvement in her memory.  She wants to know can she go back on the medication.

## 2024-02-05 NOTE — PROGRESS NOTES
Subjective     Patient ID: Erica Masterson is a 78 y.o. female.    Chief Complaint: Numbness (And tingling in face)    Patient presents for follow-up with the emergency room with complaints of numbness and tingling in the left side of her face.  She states she has been having numbness and tingling in her arms and legs for significant amount of time and is currently scheduled for a nerve conduction study of all the limbs.  She states it has only been within the last few months that she has noticed a progressive tingling in her cheek and into her mouth.  She states last week the symptoms seemed more severe than normal so she went to the emergency room out of concern that she may be having a stroke.  She was discharged and told to follow-up.  She has been taking 100 mg of gabapentin but was on 300 mg gabapentin in the past.  Upon further examination of her chart it looks like the medication was lowered because she was having some memory problems while taking tizanidine, doxepin and gabapentin together.  She has no longer taking the tizanidine but is still taking the doxepin.  She is sleeping well.  She would like to increase the gabapentin and we will increase this slowly to 200 mg.  She denies any vision changes, headache or weakness in any part of the body.  She denies any new injuries.      Review of Systems   Constitutional:  Negative for activity change, appetite change, fatigue, fever and unexpected weight change.   HENT:  Negative for nasal congestion, dental problem, hearing loss, postnasal drip, rhinorrhea, sinus pressure/congestion and trouble swallowing.    Eyes:  Negative for photophobia, discharge and visual disturbance.   Respiratory:  Negative for cough, chest tightness, shortness of breath and wheezing.    Cardiovascular:  Negative for chest pain, palpitations and leg swelling.   Gastrointestinal:  Negative for abdominal pain, blood in stool, constipation, diarrhea, nausea and vomiting.   Genitourinary:   Negative for difficulty urinating, dyspareunia, dysuria, hematuria, menstrual problem, pelvic pain, vaginal bleeding, vaginal discharge and vaginal pain.   Musculoskeletal:  Positive for arthralgias, back pain and neck pain. Negative for joint swelling, leg pain and myalgias.   Integumentary:  Negative for color change and rash.   Neurological:  Positive for numbness. Negative for dizziness, seizures, weakness, light-headedness, headaches and coordination difficulties.   Hematological:  Does not bruise/bleed easily.   Psychiatric/Behavioral:  Negative for sleep disturbance and suicidal ideas. The patient is not nervous/anxious.           Objective     Physical Exam  Constitutional:       Appearance: She is well-developed.   HENT:      Head: Normocephalic and atraumatic.   Eyes:      Conjunctiva/sclera: Conjunctivae normal.      Pupils: Pupils are equal, round, and reactive to light.   Neck:      Vascular: No JVD.      Trachea: Trachea and phonation normal.      Comments: When patient does range of motion and tilts her head to the left.  The numbness and tingling in the face is exacerbated and stretches all the way to the nose.  When she relaxes her neck it is just around her ear.  Cardiovascular:      Rate and Rhythm: Normal rate and regular rhythm.      Heart sounds: No murmur heard.     No friction rub. No gallop.   Pulmonary:      Effort: Pulmonary effort is normal. No respiratory distress.      Breath sounds: Normal breath sounds. No wheezing or rales.   Musculoskeletal:      Cervical back: Neck supple. Pain with movement and muscular tenderness present. No spinous process tenderness. Decreased range of motion.   Skin:     General: Skin is warm and dry.   Neurological:      Mental Status: She is alert and oriented to person, place, and time.   Psychiatric:         Mood and Affect: Mood normal.         Behavior: Behavior normal.         Thought Content: Thought content normal.         Judgment: Judgment normal.        [facility] [place] [facility]  [address]  [place] [address]  [phone]     Radiology Result      Name:   :  Patient MRN:   Erica Masterson [birth year] [id number]   Account Number: Room & Bed Accession Number:   [id number]   [id number]   Authorizing Physician: Patient Class: Diagnosis:   Melissa Nguyen OP- Outpatient Diagnostic Testing Neuropathy [G62.9 (ICD-10-CM)]   Procedure:  Exam Date: Reason for Exam:   X-Ray Cervical Spine Complete 5 view 2024 None Specified       RESULTS:  EXAMINATION:  XR CERVICAL SPINE COMPLETE 5 VIEW     CLINICAL HISTORY:  . Polyneuropathy, unspecified     TECHNIQUE:  AP, Lateral, bilateral oblique and open mouth views of the cervical spine were performed.     COMPARISON:  2020     FINDINGS:  There is evidence of prior extensive dental surgery.  There is loss of disc space height at C4/C5, C5/C6 and C6/C7.  Anterior and posterior osteophyte formation is noted at C4/C5 and C5/C6.  Bony foraminal narrowing is seen bilaterally in the midcervical region.  I do not see evidence of acute fracture or subluxation.  Surgical clips overlie the neck.     Impression:     Moderately severe mid cervical spondylosis with suggestion of progression in the midcervical region relative to 2020.          Assessment and Plan     1. Neuropathy  -     X-Ray Cervical Spine Complete 5 view; Future; Expected date: 2024  -     Ambulatory referral/consult to [facility] Sp[facility] Clinic; Future; Expected date: 2024    2. Numbness and tingling  -     gabapentin (NEURONTIN) 100 MG capsule; Take 2 capsules (200 mg total) by mouth every evening.  Dispense: 60 capsule; Refill: 1  -     Ambulatory referral/consult to [facility]; Future; Expected date: 2024    3. Neck pain  -     Ambulatory referral/consult to [facility]; Future; Expected date: 2024      I am sending patient to back and spine for evaluation so patient gets proper imaging  ordered since she has hardware in her neck.           No follow-ups on file.

## 2024-02-06 ENCOUNTER — TELEPHONE (OUTPATIENT)
Dept: PAIN MEDICINE | Facility: CLINIC | Age: 79
End: 2024-02-06

## 2024-02-06 ENCOUNTER — OFFICE VISIT (OUTPATIENT)
Dept: PAIN MEDICINE | Facility: CLINIC | Age: 79
End: 2024-02-06
Payer: MEDICARE

## 2024-02-06 VITALS
SYSTOLIC BLOOD PRESSURE: 126 MMHG | BODY MASS INDEX: 25.88 KG/M2 | DIASTOLIC BLOOD PRESSURE: 71 MMHG | WEIGHT: 161 LBS | HEIGHT: 66 IN | HEART RATE: 74 BPM

## 2024-02-06 DIAGNOSIS — M54.12 CERVICAL RADICULOPATHY: Primary | ICD-10-CM

## 2024-02-06 DIAGNOSIS — M50.30 DDD (DEGENERATIVE DISC DISEASE), CERVICAL: ICD-10-CM

## 2024-02-06 PROCEDURE — 99214 OFFICE O/P EST MOD 30 MIN: CPT | Mod: S$GLB,,,

## 2024-02-06 PROCEDURE — 99999 PR PBB SHADOW E&M-EST. PATIENT-LVL III: CPT | Mod: PBBFAC,,,

## 2024-02-06 NOTE — TELEPHONE ENCOUNTER
Yes, she can go back on the rosuvastatin/Crestor.  Does she still have some or does she need a prescription sent in?

## 2024-02-06 NOTE — PROGRESS NOTES
SUBJECTIVE:    Patient ID: Erica Masterson is a 78 y.o. female.    Chief Complaint: Neck Pain  INTERVAL HPI:   Erica Masterson is a 78 y.o. female who presents today as an established patient for management of right hip and leg pain. The patient presents today with neck pain. The patient was evaluated by her PCP yesterday for neck and foot pain. The patient reports her neck pain is currently the worse of her pain. The patient localizes her pain to the base of her neck with radiation towards her left shoulder. The patient had Xray of Cspine yesterday.  The patient reports she exercises 3 x a week and has recently restarted yoga.  The patient reports that her pain is a 6/10. Patient denies of any urinary/fecal incontinence, saddle anesthesia, or weakness.      HPI: ( PER DR. WALTON)  She presents today for follow-up having completed her course of physical therapy for neck and low back pain.  She does find physical therapy beneficial however she is still not satisfied with her quality of life.  Her primary complaint is of low back pain at the lumbosacral junction radiates into the posterior portion of the right leg and into the calf.  Secondary complaint of posterior neck discomfort.  She is also complaining of some mid to lower thoracic pain.  All of these are familiar symptoms.  Her pain level is 5/10    Pain Scales  Best:/10  Worst:  Usually:  Today: 3/10    Follow-up  This is a chronic problem. The problem has been gradually improving. Associated symptoms include myalgias, neck pain and numbness. Pertinent negatives include no abdominal pain, arthralgias, chest pain, chills, diaphoresis, fatigue, fever, headaches, joint swelling, nausea, vomiting or weakness.   Neck Pain   Associated symptoms include numbness. Pertinent negatives include no chest pain, fever, headaches, trouble swallowing or weakness.     Interventional Pain Procedures:   11/7/2023: Right knee steroid joint injection   7/20/2023: Interlaminar  epidural steroid injection at L5-S1-80% relief.     Past Medical History:   Diagnosis Date    Adenomatous polyp of colon 3/26/2012    Allergy     Anxiety     Cancer     Cataract     Colon polyp     Degenerative arthritis of knee 04/03/2012    Diverticulosis     Encounter for blood transfusion     GERD (gastroesophageal reflux disease)     History of thyroid cancer 2021    Hyperlipidemia     Hypertension     Lateral meniscal tear 5/20/2013    Multinodular goiter 03/26/2012    Myopathy, unspecified 01/18/2010    Tuberculosis      Social History     Socioeconomic History    Marital status:    Tobacco Use    Smoking status: Never    Smokeless tobacco: Never   Substance and Sexual Activity    Alcohol use: Not Currently     Comment: stopped 2019    Drug use: No    Sexual activity: Not Currently     Social Determinants of Health     Financial Resource Strain: Low Risk  (1/19/2023)    Overall Financial Resource Strain (CARDIA)     Difficulty of Paying Living Expenses: Not hard at all   Food Insecurity: No Food Insecurity (1/19/2023)    Hunger Vital Sign     Worried About Running Out of Food in the Last Year: Never true     Ran Out of Food in the Last Year: Never true   Transportation Needs: No Transportation Needs (1/19/2023)    PRAPARE - Transportation     Lack of Transportation (Medical): No     Lack of Transportation (Non-Medical): No   Physical Activity: Sufficiently Active (1/19/2023)    Exercise Vital Sign     Days of Exercise per Week: 3 days     Minutes of Exercise per Session: 60 min   Stress: No Stress Concern Present (1/19/2023)    [employer] - Occupational Stress Questionnaire     Feeling of Stress : Not at all   Social Connections: Moderately Integrated (1/19/2023)    Social Connection and Isolation Panel [NHANES]     Frequency of Communication with Friends and Family: Three times a week     Frequency of Social Gatherings with Friends and Family: Three times a week      Attends [employer] Services: More than 4 times per year     Active Member of Clubs or Organizations: No     Attends Club or Organization Meetings: Never     Marital Status:    Housing Stability: Low Risk  (1/19/2023)    Housing Stability Vital Sign     Unable to Pay for Housing in the Last Year: No     Number of Places Lived in the Last Year: 1     Unstable Housing in the Last Year: No     Past Surgical History:   Procedure Laterality Date    BREAST BIOPSY Left 2015    benign    CHOLECYSTECTOMY      COLONOSCOPY  12/02/2015    Dr. Britton, multiple polyps, recheck five years-three years if more than 2 polyps are adenomatous    COLONOSCOPY N/A 12/02/2015    COLONOSCOPY N/A 03/20/2019    Procedure: COLONOSCOPY;  Surgeon: Jose Britton MD;  Location: [facility];  Service: Endoscopy;  Laterality: N/A;    COLONOSCOPY N/A 05/20/2020    Dr. Britton; internal hemorrhoids; diverticulosis; polyps removed; repeat in 3 years    COLONOSCOPY N/A 11/16/2023    Procedure: COLONOSCOPY(instructions mailed 11/9);  Surgeon: Jose Britton MD;  Location: [facility];  Service: Endoscopy;  Laterality: N/A;    CYSTOSCOPY N/A 08/26/2020    Procedure: CYSTOSCOPY;  Surgeon: Charlotte Walters Jr., MD;  Location: [facility] OR;  Service: Urology;  Laterality: N/A;    DILATION AND CURETTAGE OF UTERUS      DISSECTION OF NECK Left 09/13/2021    Procedure: DISSECTION, NECK;  Surgeon: Ryan Torres MD;  Location: [facility] 2ND FLR;  Service: ENT;  Laterality: Left;    EPIDURAL STEROID INJECTION INTO LUMBAR SPINE N/A 7/20/2023    Procedure: Injection-steroid-epidural-lumbar;  Surgeon: Julián Chiang MD;  Location: [facility];  Service: Pain Management;  Laterality: N/A;  L5-s1    ESOPHAGEAL MANOMETRY WITH MEASUREMENT OF IMPEDANCE N/A 08/22/2022    Procedure: MANOMETRY, ESOPHAGUS, WITH IMPEDANCE MEASUREMENT;  Surgeon: Pantera Mcguire MD;  Location: [facility] (4TH FLR);  Service: Endoscopy;  Laterality: N/A;  8/4 fully vaccinated; instructions mailed-st     ESOPHAGOGASTRODUODENOSCOPY N/A 05/20/2020    Dr. Britton; small hiatal hernia; gastritis; 8 gastric polyps removed    ESOPHAGOGASTRODUODENOSCOPY N/A 04/01/2022    Procedure: EGD (ESOPHAGOGASTRODUODENOSCOPY);  Surgeon: Jose Britton MD;  Location: [facility];  Service: Endoscopy;  Laterality: N/A;    FOOT SURGERY      HYSTERECTOMY      TVH/BSO > 20 years    INCISION OF VULVA Bilateral 8/4/2023    Procedure: EXCISION, CYST, LABIA AND OTHER INDICATED PROCEDURES;  Surgeon: Mat Beach MD;  Location: [facility] OR;  Service: OB/GYN;  Laterality: Bilateral;  EXCYSION OF VULVAR CYST    INJECTION, SACROILIAC JOINT Right 12/1/2023    Procedure: INJECTION,SACROILIAC JOINT;  Surgeon: Julián Chiang MD;  Location: [facility] OR;  Service: Anesthesiology;  Laterality: Right;  sacroiliac injection    INTRALUMINAL GASTROINTESTINAL TRACT IMAGING VIA CAPSULE N/A 06/04/2020    Procedure: IMAGING PROCEDURE, GI TRACT, INTRALUMINAL, VIA CAPSULE;  Surgeon: Jose Britton MD;  Location: [facility];  Service: Endoscopy;  Laterality: N/A;    KNEE SURGERY  06/06/2013    right knee tear Dr Iverson     LAPAROSCOPIC CHOLECYSTECTOMY N/A 09/17/2020    Procedure: CHOLECYSTECTOMY, LAPAROSCOPIC;  Surgeon: Forrest Veronica MD;  Location: [facility] OR;  Service: General;  Laterality: N/A;    LUNG LOBECTOMY  1966    right middle lobectomy, due to Tb    OOPHORECTOMY      SHOULDER SURGERY      francis     THYROIDECTOMY Bilateral 05/19/2021    Procedure: THYROIDECTOMY;  Surgeon: Jamia Amezquita MD;  [facility];  Service: General;  Laterality: Bilateral;    THYROIDECTOMY Bilateral 09/13/2021    Procedure: THYROIDECTOMY WITH INTRA-OP PTH;  Surgeon: Ryan Torres MD;  Location: [facility] OR 2ND FLR;  Service: ENT;  Laterality: Bilateral;  NIM tube  Intraop PTH     Family History   Problem Relation Age of Onset    Colon cancer Other     Cancer Mother     Hypertension Mother     Hyperlipidemia Mother     Cancer Brother     Hypertension Father      "Emphysema Father     Diabetes Son     Hypertension Son     Alcohol abuse Son     Diabetes Maternal Aunt     Alzheimer's disease Maternal Uncle     Cancer Maternal Grandmother     No Known Problems Daughter     Dementia Sister     Alzheimer's disease Sister     Breast cancer Sister 60    Obesity Paternal Uncle     Prostate cancer Other     Cancer Brother     Melanoma Neg Hx     Psoriasis Neg Hx     Lupus Neg Hx     Eczema Neg Hx     Amblyopia Neg Hx     Blindness Neg Hx     Cataracts Neg Hx     Glaucoma Neg Hx     Macular degeneration Neg Hx     Retinal detachment Neg Hx     Strabismus Neg Hx     Stroke Neg Hx     Thyroid cancer Neg Hx     Colon polyps Neg Hx     Ulcerative colitis Neg Hx     Stomach cancer Neg Hx     Rectal cancer Neg Hx      Vitals:    02/06/24 1311   BP: 126/71   Pulse: 74   Weight: 73 kg (161 lb)   Height: 5' 6" (1.676 m)         Review of Systems   Constitutional:  Negative for chills, diaphoresis, fatigue, fever and unexpected weight change.   HENT:  Negative for trouble swallowing.    Eyes:  Negative for visual disturbance.   Respiratory:  Negative for shortness of breath.    Cardiovascular:  Negative for chest pain.   Gastrointestinal:  Negative for abdominal pain, constipation, nausea and vomiting.   Genitourinary:  Negative for difficulty urinating.   Musculoskeletal:  Positive for back pain, gait problem, myalgias and neck pain. Negative for arthralgias, joint swelling and neck stiffness.   Neurological:  Positive for numbness. Negative for dizziness, speech difficulty, weakness, light-headedness and headaches.   All other systems reviewed and are negative.         Objective:      Physical Exam  Vitals and nursing note reviewed.   Musculoskeletal:         General: Tenderness present.   Skin:     General: Skin is warm and dry.   Neurological:      Mental Status: She is alert and oriented to person, place, and time.   Psychiatric:         Mood and Affect: Mood normal.         LUMBAR " SPINE  Lumbar spine: ROM is limited with flexion extension and oblique extension with increased pain with extension.     ((+)) Supine straight leg raise left side   ((-)) Facet loading   Internal and external rotation of the hip causes no increased pain .  Myofascial exam: Tenderness to palpation across lumbar parapsinous processes      (+) TTP at the SI joint to the right  ((+)) JOSE's test  (+)) One leg stand    ((+)) Distraction test  ((+)) Thigh thrust     Knee exam:    Extension/flexion equal bilaterally.   + crepitus with motion right knees.    - effusion both knees.    + joint line tenderness of lateral aspect of right knee     IMAGIN/5/23: xray of cspine:  FINDINGS:  There is evidence of prior extensive dental surgery.  There is loss of disc space height at C4/C5, C5/C6 and C6/C7.  Anterior and posterior osteophyte formation is noted at C4/C5 and C5/C6.  Bony foraminal narrowing is seen bilaterally in the midcervical region.  I do not see evidence of acute fracture or subluxation.  Surgical clips overlie the neck.     Impression:     Moderately severe mid cervical spondylosis with suggestion of progression in the midcervical region relative to 2020.    10/31/2023: XRAY OF RIGHT KNEE FINDINGS:  No acute fracture or malalignment.  Severe degenerative change lateral compartment right knee and minimal degenerative change elsewhere.  Trace right knee joint fluid.       Assessment:     Erica Masterson is  78 y.o. female who presents today as an established patient.  The patient presents with cervical radiculopathy.  Xray significant for DDD at multiple levels.    1. Cervical radiculopathy    2. DDD (degenerative disc disease), cervical                   Plan:     - Reviewed pertinent imaging and records with patient   -  discussed treatment options, recommended Interlaminar DWAYNE at C7-T1. Pt elected to proceed. Written consent obtained. Procedure explained in detail.   - Order MRI of Cspine if  pain fails to improve.   - Plan to increse Gabapentin as tolerated at f/u.   - RTC for the procedure as outlined above   Sania Stephen NP       Cervical radiculopathy  -     Procedure Order to Pain Management; Future; Expected date: 02/06/2024    DDD (degenerative disc disease), cervical  -     Procedure Order to Pain Management; Future; Expected date: 02/06/2024

## 2024-02-06 NOTE — TELEPHONE ENCOUNTER
Types of orders made on 02/06/2024: Procedure Request      Order Date:2/6/2024   Ordering User:ASNIA BARROSO [[id number]]   Encounter Provider:Sania Barroso NP [[id number]]   Authorizing Provider: Sania Barreto NP [[id number]]   Supervising Provider:HANNY CONNELL [[id number]]   Type of Supervision:Collaborating Physician   Department:[facility] PAIN MANAGEMENT[[id number]]      Common Order Information   Procedure -> Epidural Injection (specify level) Cmt: C7-T1      Pre-op Diagnosis -> Cervical radiculopathy       -> DDD (degenerative disc disease), cervical      Order Specific Information   Order: Procedure Order to Pain Management [Cus   arcelia: [id number]]  Order #:          [id number]: 1 FUTURE     Priority: Routine  Class: Clinic Performed     Future Order Information       Expires on:02/06/2025            Expected by:02/06/2024                   Associated Diagnoses       M54.12 Cervical radiculopathy       M50.30 DDD (degenerative disc disease), cervical       Physician -> dr. connell          Is patient on anti-coagulants? -> No          Facility Name:   -> [facility]          Follow-up: -> 4 weeks              Priority: Routine  Class: Clinic Performed     Future Order Information       Expires on:02/06/2025            Expected by:02/06/2024                   Associated Diagnoses       M54.12 Cervical radiculopathy       M50.30 DDD (degenerative disc disease), cervical

## 2024-02-09 ENCOUNTER — TELEPHONE (OUTPATIENT)
Dept: FAMILY MEDICINE | Facility: CLINIC | Age: 79
End: 2024-02-09
Payer: MEDICARE

## 2024-02-09 NOTE — TELEPHONE ENCOUNTER
----- Message from Elizabeth Lara sent at 2/9/2024  7:13 AM CST -----  Contact: Patient  Type: Needs Medical Advice    Who Called:  Patient  What is this regarding?:  Both legs and her seat cramp up before she gets out of bead in the morning. It has been for 2 days now.  Best Call Back Number:  [phone] or  [phone] after 9:00 am   Additional Information:  Please call the patient back at the phone number listed above to advise. Thank you!

## 2024-02-09 NOTE — TELEPHONE ENCOUNTER
Patient started the increased dose of Gabapentin for her neuropathy on 2/5/24- still having symptoms- advised her to give the medication more time to become effective. Advised her to call after 2 wks on new dose if still having symptoms.

## 2024-02-12 DIAGNOSIS — R20.2 NUMBNESS AND TINGLING: ICD-10-CM

## 2024-02-12 DIAGNOSIS — R20.0 NUMBNESS AND TINGLING: ICD-10-CM

## 2024-02-12 RX ORDER — GABAPENTIN 100 MG/1
200 CAPSULE ORAL NIGHTLY
Qty: 60 CAPSULE | Refills: 1 | Status: SHIPPED | OUTPATIENT
Start: 2024-02-12 | End: 2024-05-20

## 2024-02-12 NOTE — TELEPHONE ENCOUNTER
----- Message from Kenzie Fay sent at 2/12/2024  3:40 PM CST -----  Type:  Sooner Appointment Request    Caller is requesting a sooner appointment.  Caller declined first available appointment listed below.  Caller will not accept being placed on the waitlist and is requesting a message be sent to doctor.    Name of Caller:  pt   When is the first available appointment?  Feb 19   Symptoms:  depressed   Would the patient rather a call back or a response via [facility]? Call   Best Call Back Number:  [phone] (home)     Additional Information:  please advise

## 2024-02-12 NOTE — TELEPHONE ENCOUNTER
----- Message from Saima Khalil sent at 2/12/2024  3:13 PM CST -----  Regarding: refill  Contact: pt  Type:  RX Refill Request    Who Called: pt  Refill or New Rx:refill  RX Name and Strength:gabapentin (NEURONTIN) 100 MG capsule  How is the patient currently taking it? (ex. 1XDay):Route: Take 2 capsules (200 mg total) by mouth every evening  Is this a 30 day or 90 day RX:30  Preferred Pharmacy with phone number:  [id number] - [place] - [address]  [place] [address]  Phone: [phone] Fax: [phone]      Local or Mail Order:local  Ordering Provider:Luica  Would the patient rather a call back or a response via [facility]? Call back  Best Call Back Number:[phone]    Additional Information: sts she needs a refill

## 2024-02-12 NOTE — TELEPHONE ENCOUNTER
No care due was identified.  [facility] Embedded Care Due Messages. Reference number: [id number].   2/12/2024 4:17:12 PM CST

## 2024-02-16 ENCOUNTER — OFFICE VISIT (OUTPATIENT)
Dept: FAMILY MEDICINE | Facility: CLINIC | Age: 79
End: 2024-02-16
Attending: FAMILY MEDICINE
Payer: MEDICARE

## 2024-02-16 VITALS
OXYGEN SATURATION: 96 % | TEMPERATURE: 98 F | BODY MASS INDEX: 26.24 KG/M2 | SYSTOLIC BLOOD PRESSURE: 138 MMHG | DIASTOLIC BLOOD PRESSURE: 68 MMHG | HEIGHT: 66 IN | HEART RATE: 70 BPM | WEIGHT: 163.25 LBS

## 2024-02-16 DIAGNOSIS — I73.9 PAD (PERIPHERAL ARTERY DISEASE): ICD-10-CM

## 2024-02-16 DIAGNOSIS — E11.00 TYPE 2 DIABETES MELLITUS WITH HYPEROSMOLARITY WITHOUT COMA, WITHOUT LONG-TERM CURRENT USE OF INSULIN: Chronic | ICD-10-CM

## 2024-02-16 DIAGNOSIS — F41.1 GAD (GENERALIZED ANXIETY DISORDER): Chronic | ICD-10-CM

## 2024-02-16 DIAGNOSIS — B00.9 HSV INFECTION: ICD-10-CM

## 2024-02-16 DIAGNOSIS — I15.2 HYPERTENSION ASSOCIATED WITH DIABETES: Chronic | ICD-10-CM

## 2024-02-16 DIAGNOSIS — C73 METASTASIS FROM THYROID CANCER: ICD-10-CM

## 2024-02-16 DIAGNOSIS — E11.59 HYPERTENSION ASSOCIATED WITH DIABETES: Chronic | ICD-10-CM

## 2024-02-16 DIAGNOSIS — N18.31 STAGE 3A CHRONIC KIDNEY DISEASE: ICD-10-CM

## 2024-02-16 DIAGNOSIS — C73 THYROID CANCER: Chronic | ICD-10-CM

## 2024-02-16 DIAGNOSIS — F32.9 REACTIVE DEPRESSION: Chronic | ICD-10-CM

## 2024-02-16 DIAGNOSIS — C79.9 METASTASIS FROM THYROID CANCER: ICD-10-CM

## 2024-02-16 DIAGNOSIS — E89.0 POSTOPERATIVE HYPOTHYROIDISM: Chronic | ICD-10-CM

## 2024-02-16 DIAGNOSIS — E20.9 HYPOPARATHYROIDISM, UNSPECIFIED HYPOPARATHYROIDISM TYPE: ICD-10-CM

## 2024-02-16 DIAGNOSIS — E11.69 HYPERLIPIDEMIA ASSOCIATED WITH TYPE 2 DIABETES MELLITUS: Primary | Chronic | ICD-10-CM

## 2024-02-16 DIAGNOSIS — R41.3 MEMORY IMPAIRMENT: ICD-10-CM

## 2024-02-16 DIAGNOSIS — E78.5 HYPERLIPIDEMIA ASSOCIATED WITH TYPE 2 DIABETES MELLITUS: Primary | Chronic | ICD-10-CM

## 2024-02-16 PROCEDURE — 99999 PR PBB SHADOW E&M-EST. PATIENT-LVL III: CPT | Mod: PBBFAC,,, | Performed by: FAMILY MEDICINE

## 2024-02-16 PROCEDURE — 99214 OFFICE O/P EST MOD 30 MIN: CPT | Mod: S$GLB,,, | Performed by: FAMILY MEDICINE

## 2024-02-16 RX ORDER — DONEPEZIL HYDROCHLORIDE 5 MG/1
10 TABLET, FILM COATED ORAL NIGHTLY
Qty: 30 TABLET | Refills: 5
Start: 2024-02-16 | End: 2025-02-15

## 2024-02-16 RX ORDER — VALACYCLOVIR HYDROCHLORIDE 500 MG/1
TABLET, FILM COATED ORAL
Qty: 12 TABLET | Refills: 11 | Status: SHIPPED | OUTPATIENT
Start: 2024-02-16

## 2024-02-16 RX ORDER — TIZANIDINE 2 MG/1
4 TABLET ORAL NIGHTLY
COMMUNITY
End: 2024-06-17 | Stop reason: SDUPTHER

## 2024-02-16 NOTE — PROGRESS NOTES
Subjective:       Patient ID: Erica Masterson is a 78 y.o. female.    Chief Complaint: Depression    78-year-old female coming in for several concerns.  She has a history of depression and has occasional down episodes two or 3 times a week usually not lasting long and recovering.  She has been going to the [employer] and socializing, she has been keeping the lights on in her house keeping it very bright and she does feel that this helps.  She is currently using some doxepin as a sleep aid and it is working well and probably also helping with her depression.  We discussed adding another agent as a dedicated antidepressant but she did not feel that she needed it.  She does feel the Aricept is helping her memory but she would like to try a higher dose and see if it can be a little better.  She has not having any diarrhea on the current dose but she was warned to watch for it at a higher dose.  She will take two of her 5 mg to test it and if she is successful in tolerates it we can send in the 10 mg pills in a week or two.    She has been getting a rash once or twice a year in her left groin area very near the anterior superior iliac spine in the upper inguinal zone on the left side.  It tends to itch and burn before it breaks out and then she breaks out in a cluster of small vesicles that turned into pustules and then open and dry.  Usually she squeezes them to break them and drain the fluid from them and has not seen any additional lesions.  She does have a cluster of the vesicles present at this time and it has the typical appearance of a herpetic lesion.  She has been working on controlling her diabetes and has an upcoming appointment with Malissa Robbins with some lab to be done one week ahead of the appointment.  She is also due for a lipid panel and microalbumin and we will add that to the scheduled lab.  Her thyroglobulin is still running a little high suggesting active thyroid cancer.  This is also being followed  by Malissa Robbins.    Past Medical History:  3/26/2012: Adenomatous polyp of colon  No date: Allergy  No date: Anxiety  No date: Cancer  No date: Cataract  No date: Colon polyp  04/03/2012: Degenerative arthritis of knee  No date: Diverticulosis  No date: Encounter for blood transfusion  No date: GERD (gastroesophageal reflux disease)  2021: History of thyroid cancer  No date: Hyperlipidemia  No date: Hypertension  5/20/2013: Lateral meniscal tear  03/26/2012: Multinodular goiter  01/18/2010: Myopathy, unspecified  No date: Tuberculosis    Past Surgical History:  2015: BREAST BIOPSY; Left      Comment:  benign  No date: CHOLECYSTECTOMY  12/02/2015: COLONOSCOPY      Comment:  Dr. Britton, multiple polyps, recheck five years-three                years if more than 2 polyps are adenomatous  12/02/2015: COLONOSCOPY; N/A  03/20/2019: COLONOSCOPY; N/A      Comment:  Procedure: COLONOSCOPY;  Surgeon: Jose Britton MD;                Location: [facility];  Service: Endoscopy;  Laterality:                N/A;  05/20/2020: COLONOSCOPY; N/A      Comment:  Dr. Britton; internal hemorrhoids; diverticulosis;                polyps removed; repeat in 3 years  11/16/2023: COLONOSCOPY; N/A      Comment:  Procedure: COLONOSCOPY(instructions mailed 11/9);                 Surgeon: Jose Britton MD;  Location: [facility];                 Service: Endoscopy;  Laterality: N/A;  08/26/2020: CYSTOSCOPY; N/A      Comment:  Procedure: CYSTOSCOPY;  Surgeon: Charlotte Walters Jr., MD;               [facility];  Service: Urology;  Laterality: N/A;  No date: DILATION AND CURETTAGE OF UTERUS  09/13/2021: DISSECTION OF NECK; Left      Comment:  Procedure: DISSECTION, NECK;  Surgeon: Ryan Torres MD;  Location: [address];  Service: ENT;                 Laterality: Left;  7/20/2023: EPIDURAL STEROID INJECTION INTO LUMBAR SPINE; N/A      Comment:  Procedure: Injection-steroid-epidural-lumbar;  Surgeon:                 Julián Chiang MD;  Location: [facility] OR;  Service: Pain                Management;  Laterality: N/A;  L5-s1  08/22/2022: ESOPHAGEAL MANOMETRY WITH MEASUREMENT OF IMPEDANCE; N/A      Comment:  Procedure: MANOMETRY, ESOPHAGUS, WITH IMPEDANCE                MEASUREMENT;  Surgeon: Pantera Mcguire MD;  Location: [facility]                ENDO (4TH FLR);  Service: Endoscopy;  Laterality: N/A;                 8/4 fully vaccinated; instructions mailed-st  05/20/2020: ESOPHAGOGASTRODUODENOSCOPY; N/A      Comment:  Dr. Britton; small hiatal hernia; gastritis; 8 gastric                polyps removed  04/01/2022: ESOPHAGOGASTRODUODENOSCOPY; N/A      Comment:  Procedure: EGD (ESOPHAGOGASTRODUODENOSCOPY);  Surgeon:                Jose Britton MD;  Location: [facility];  Service:                Endoscopy;  Laterality: N/A;  No date: FOOT SURGERY  No date: HYSTERECTOMY      Comment:  TVH/BSO > 20 years  8/4/2023: INCISION OF VULVA; Bilateral      Comment:  Procedure: EXCISION, CYST, LABIA AND OTHER INDICATED                PROCEDURES;  Surgeon: Mat Beach MD;  Location:                [facility];  Service: OB/GYN;  Laterality: Bilateral;                 EXCYSION OF VULVAR CYST  12/1/2023: INJECTION, SACROILIAC JOINT; Right      Comment:  Procedure: INJECTION,SACROILIAC JOINT;  Surgeon: Julián Chiang MD;  Location: [facility] OR;  Service:                Anesthesiology;  Laterality: Right;  sacroiliac injection  06/04/2020: INTRALUMINAL GASTROINTESTINAL TRACT IMAGING VIA CAPSULE;   N/A      Comment:  Procedure: IMAGING PROCEDURE, GI TRACT, INTRALUMINAL,                VIA CAPSULE;  Surgeon: Jose Britton MD;  Location:                [facility];  Service: Endoscopy;  Laterality: N/A;  06/06/2013: KNEE SURGERY      Comment:  right knee tear Dr Iverson   09/17/2020: LAPAROSCOPIC CHOLECYSTECTOMY; N/A      Comment:  Procedure: CHOLECYSTECTOMY, LAPAROSCOPIC;  Surgeon:                Forrest Veronica MD;  [facility];   Service:                General;  Laterality: N/A;  1966: LUNG LOBECTOMY      Comment:  right middle lobectomy, due to Tb  No date: OOPHORECTOMY  No date: SHOULDER SURGERY      Comment:  francis   05/19/2021: THYROIDECTOMY; Bilateral      Comment:  Procedure: THYROIDECTOMY;  Surgeon: Jamia Amezquita MD;  Location: [address];  Service: General;                 Laterality: Bilateral;  09/13/2021: THYROIDECTOMY; Bilateral      Comment:  Procedure: THYROIDECTOMY WITH INTRA-OP PTH;  Surgeon:                Ryan Torres MD;  Location: [facility] OR [address] [place] [address];  Service:               ENT;  Laterality: Bilateral;  NIM tube  Intraop PTH    Current Outpatient Medications on File Prior to Visit:  amLODIPine (NORVASC) 2.5 MG tablet, Take 1 tablet by mouth once daily, Disp: 90 tablet, Rfl: 0  artificial tears (ISOPTO TEARS) 0.5 % ophthalmic solution, 1 drop as needed., Disp: , Rfl:   blood sugar diagnostic (BLOOD GLUCOSE TEST) Strp, True Metrix glucose strips check once daily, Disp: 100 each, Rfl: 3  doxepin (SINEQUAN) 25 MG capsule, Take 1 capsule (25 mg total) by mouth every evening., Disp: 90 capsule, Rfl: 3  ergocalciferol (ERGOCALCIFEROL) 50,000 unit Cap, Take one capsule twice monthly., Disp: 6 capsule, Rfl: 3  fluticasone propionate (FLONASE) 50 mcg/actuation nasal spray, 1 spray (50 mcg total) by Each Nostril route once daily., Disp: 16 g, Rfl: 5  gabapentin (NEURONTIN) 100 MG capsule, Take 2 capsules (200 mg total) by mouth every evening., Disp: 60 capsule, Rfl: 1  levothyroxine (SYNTHROID) 75 MCG tablet, Take 1 tablet (75 mcg total) by mouth before breakfast. Except half a tablet on Sundays, Disp: 90 tablet, Rfl: 3  loratadine (CLARITIN) 10 mg tablet, Take 1 tablet (10 mg total) by mouth once daily., Disp: 30 tablet, Rfl: 5  losartan (COZAAR) 100 MG tablet, Take 1 tablet (100 mg total) by mouth once daily., Disp: 90 tablet, Rfl: 3  rosuvastatin (CRESTOR) 10 MG tablet, Take 1 tablet (10 mg total) by  mouth once daily., Disp: 90 tablet, Rfl: 4  thiamine 100 MG tablet, Take 1 tablet (100 mg total) by mouth once daily., Disp: 30 tablet, Rfl: 5  tiZANidine (ZANAFLEX) 2 MG tablet, Take 4 mg by mouth every evening., Disp: , Rfl:   [DISCONTINUED] donepeziL (ARICEPT) 5 MG tablet, Take 1 tablet (5 mg total) by mouth every evening., Disp: 30 tablet, Rfl: 5  blood-glucose meter kit, True Metrix glucometer check glucose once daily, Disp: 1 each, Rfl: 0  diazePAM (VALIUM) 2 MG tablet, Take 1 tablet (2 mg total) by mouth nightly as needed (muscle spasm)., Disp: 7 tablet, Rfl: 0  ibuprofen (ADVIL,MOTRIN) 600 MG tablet, Take 1 tablet (600 mg total) by mouth every 6 (six) hours as needed for Pain. (Patient not taking: Reported on 2/16/2024), Disp: 30 tablet, Rfl: 0    No current facility-administered medications on file prior to visit.          Review of Systems   Constitutional:  Negative for activity change, appetite change and unexpected weight change.   Respiratory:  Negative for chest tightness and shortness of breath.    Endocrine: Negative for polydipsia and polyuria.   Skin:  Positive for rash. Negative for color change.   Psychiatric/Behavioral:  Positive for dysphoric mood. Negative for decreased concentration and sleep disturbance. The patient is not nervous/anxious.        Objective:      Physical Exam  Vitals and nursing note reviewed.   Constitutional:       General: She is not in acute distress.     Appearance: Normal appearance. She is normal weight. She is not ill-appearing, toxic-appearing or diaphoretic.      Comments: Good blood pressure control  Normal pulse with regular rhythm   Good weight for age with a BMI of 26.4 weight is up 1.5 lb from her last visit with me December 20, 2023   Cardiovascular:      Rate and Rhythm: Normal rate and regular rhythm.      Heart sounds: Normal heart sounds.   Pulmonary:      Effort: Pulmonary effort is normal.      Breath sounds: Normal breath sounds.   Skin:     Findings:  Rash present. Rash is pustular.          Neurological:      General: No focal deficit present.      Mental Status: She is alert and oriented to person, place, and time. Mental status is at baseline.   Psychiatric:         Attention and Perception: Attention and perception normal. She is attentive.         Mood and Affect: Affect normal. Mood is anxious (Slightly).         Speech: Speech normal.         Behavior: Behavior normal. Behavior is cooperative.         Thought Content: Thought content normal.         Cognition and Memory: Cognition is not impaired. Memory is not impaired.         Assessment:       1. Hyperlipidemia associated with type 2 diabetes mellitus, baseline     2. Memory impairment    3. Type 2 diabetes mellitus with hyperosmolarity without coma, without long-term current use of insulin    4. RONNIE (generalized anxiety disorder), 2019    5. Reactive depression    6. Hypertension associated with diabetes    7. Thyroid cancer    8. Metastasis from thyroid cancer    9. Postoperative hypothyroidism    10. Hypoparathyroidism, unspecified hypoparathyroidism type    11. PAD (peripheral artery disease)    12. Stage 3a chronic kidney disease    13. HSV infection    14. BMI 26.0-26.9,adult        Plan:       1. Memory impairment  Improved, patient will begin taking two 5 mg Aricept for a total of 10 mg daily using her current supply.  If she tolerates the dosage without significant diarrhea we can send in 10 mg tablets to replace them.  - donepeziL (ARICEPT) 5 MG tablet; Take 2 tablets (10 mg total) by mouth every evening.  Dispense: 30 tablet; Refill: 5    2. Hyperlipidemia associated with type 2 diabetes mellitus, baseline   Lab Results   Component Value Date    CHOL 154 08/08/2022    CHOL 177 04/06/2022    CHOL 163 10/30/2020     Lab Results   Component Value Date    HDL 59 08/08/2022    HDL 70 04/06/2022    HDL 63 10/30/2020     Lab Results   Component Value Date    LDLCALC 86.4 08/08/2022     LDLCALC 97.4 04/06/2022    LDLCALC 91.6 10/30/2020     Lab Results   Component Value Date    TRIG 43 08/08/2022    TRIG 48 04/06/2022    TRIG 42 10/30/2020       Lab Results   Component Value Date    CHOLHDL 38.3 08/08/2022    CHOLHDL 39.5 04/06/2022    CHOLHDL 38.7 10/30/2020     Overdue for recheck.  We will link new order to upcoming lab for Endocrinology previously satisfactory control  - Lipid Panel; Future    3. Type 2 diabetes mellitus with hyperosmolarity without coma, without long-term current use of insulin  Lab Results   Component Value Date    HGBA1C 5.8 (H) 10/25/2023     Better than satisfactory control.  Watch for hypoglycemic episodes.  Has follow-up appointment with Endocrinology and lab.  Link urine microalbumin/creatinine ratio to the lab appointment  - Microalbumin/Creatinine Ratio, Urine; Future    4. RONNIE (generalized anxiety disorder), 2019  Mildly anxious mood.  Patient declined additional medication    5. Reactive depression  Not significantly depressed at this time.  Improving and using phototherapy techniques.  Declined to have additional antidepressant medication used    6. Hypertension associated with diabetes  Adequate control no changes needed    7. Thyroid cancer  Recurrence after initial surgical excision also removed.  Thyroglobulin increased to a peak in May of 2023 and has been decreasing since that time.  PET scan was negative for any evidence of metastasis    8. Metastasis from thyroid cancer  Decreasing thyroglobulin levels with a negative PET scan.  Appears to be contained    9. Postoperative hypothyroidism  Lab Results   Component Value Date    TSH 0.322 (L) 10/25/2023     Appropriately suppressive TSH level.  She should be slightly hyperthyroid.  Recheck is scheduled before her next visit with Endocrinology in April    10. Hypoparathyroidism, unspecified hypoparathyroidism type  Lab Results   Component Value Date    PTH 41.0 05/22/2023    CALCIUM 8.3 (L) 01/30/2024    DOMINIQUE  1.10 05/22/2023    PHOS 4.6 (H) 08/09/2022     Appears resolved at this time    11. PAD (peripheral artery disease)  Asymptomatic    12. Stage 3a chronic kidney disease  BMP  Lab Results   Component Value Date     01/30/2024    K 4.5 01/30/2024     01/30/2024    CO2 27 01/30/2024    BUN 18 01/30/2024    CREATININE 1.2 01/30/2024    CALCIUM 8.3 (L) 01/30/2024    ANIONGAP 9 01/30/2024    EGFRNORACEVR 46 (A) 01/30/2024     Stable    13. HSV infection  Discussed suppressive treatment verses episodic treatment.  For a rash that occurs once or at most twice a year she did not want to take another medication daily.  Will use short course twice daily Valtrex 500 mg for three days and instructed her to initiate treatment at the slightest suspicion of an outbreak coming on as early treatment is much more successful patient indicated understanding  - valACYclovir (VALTREX) 500 MG tablet; Take one tablet (500mg) twice daily for three days for acute viral outbreak.  Start at the earliest sign of an outbreak, do not wait for visible rash (supply is enough for two outbreaks)  Dispense: 12 tablet; Refill: 11    14. BMI 26.0-26.9,adult  Good weight for age, no changes needed

## 2024-02-20 ENCOUNTER — HOSPITAL ENCOUNTER (OUTPATIENT)
Dept: RADIOLOGY | Facility: HOSPITAL | Age: 79
Discharge: HOME | End: 2024-02-20
Attending: FAMILY MEDICINE
Payer: MEDICARE

## 2024-02-20 DIAGNOSIS — R92.8 FOLLOW-UP EXAMINATION OF ABNORMAL MAMMOGRAM: ICD-10-CM

## 2024-02-20 PROCEDURE — 25000003 PHARM REV CODE 250

## 2024-02-20 PROCEDURE — 77061 BREAST TOMOSYNTHESIS UNI: CPT | Mod: TC,LT

## 2024-02-20 PROCEDURE — A4648 IMPLANTABLE TISSUE MARKER: HCPCS

## 2024-02-20 PROCEDURE — 77065 DX MAMMO INCL CAD UNI: CPT | Mod: 26,LT,, | Performed by: RADIOLOGY

## 2024-02-20 PROCEDURE — 19083 BX BREAST 1ST LESION US IMAG: CPT | Mod: LT,,, | Performed by: RADIOLOGY

## 2024-02-20 PROCEDURE — 88305 TISSUE EXAM BY PATHOLOGIST: CPT | Mod: TC | Performed by: PATHOLOGY

## 2024-02-20 PROCEDURE — 77065 DX MAMMO INCL CAD UNI: CPT | Mod: TC,LT

## 2024-02-20 PROCEDURE — 77061 BREAST TOMOSYNTHESIS UNI: CPT | Mod: 26,LT,, | Performed by: RADIOLOGY

## 2024-02-20 RX ADMIN — LIDOCAINE HYDROCHLORIDE 30 ML: 10; .005 INJECTION, SOLUTION EPIDURAL; INFILTRATION; INTRACAUDAL; PERINEURAL at 08:02

## 2024-02-22 ENCOUNTER — TELEPHONE (OUTPATIENT)
Dept: FAMILY MEDICINE | Facility: CLINIC | Age: 79
End: 2024-02-22
Payer: MEDICARE

## 2024-02-22 NOTE — TELEPHONE ENCOUNTER
----- Message from Elizabeth Lara sent at 2/22/2024  3:11 PM CST -----  Contact: Self  Type: Needs Medical Advice    Who Called:  Patient  What is this regarding?:  This is the second week when in bed she gets bad cramps in her hands and feet for 2 minutes.  Best Call Back Number:  [phone]  Additional Information:  Please call the patient back at the phone number listed above to advise. Thank you!

## 2024-02-22 NOTE — TELEPHONE ENCOUNTER
----- Message from Jolanta Barkley sent at 2/22/2024  2:48 PM CST -----  Regarding: Needs return call  Type: Needs Medical Advice  Who Called:  Erica Monzon Call Back Number: [phone]    Additional Information: Pt states she is getting horrible cramps when she gets in her bed at night, she said this has been going on over a week and wanted to speak to someone regarding this please cortney

## 2024-02-22 NOTE — TELEPHONE ENCOUNTER
Patient reports cramping in feet and legs at night when she is lying down to sleep. Onset for weeks.

## 2024-02-23 ENCOUNTER — TELEPHONE (OUTPATIENT)
Dept: FAMILY MEDICINE | Facility: CLINIC | Age: 79
End: 2024-02-23
Payer: MEDICARE

## 2024-02-23 ENCOUNTER — TELEPHONE (OUTPATIENT)
Dept: ONCOLOGY | Facility: CLINIC | Age: 79
End: 2024-02-23

## 2024-02-23 DIAGNOSIS — R92.8 ABNORMALITY OF LEFT BREAST ON SCREENING MAMMOGRAM: Primary | ICD-10-CM

## 2024-02-23 DIAGNOSIS — R92.8 FOLLOW-UP EXAMINATION OF ABNORMAL MAMMOGRAM: Primary | ICD-10-CM

## 2024-02-23 DIAGNOSIS — Z98.890 STATUS POST LEFT BREAST BIOPSY: ICD-10-CM

## 2024-02-23 NOTE — TELEPHONE ENCOUNTER
----- Message from Renata Finney sent at 2/23/2024  3:25 PM CST -----  Contact: pt  Type: Needs Medical Advice  Who Called:  pt  Best Call Back Number: [phone]    Additional Information: Pt is calling the office asking to speak with nurse little.please call back and advise.

## 2024-02-23 NOTE — TELEPHONE ENCOUNTER
Called patient- she will come by today to  this note so she has directions. She does not use the My Chart.

## 2024-02-23 NOTE — TELEPHONE ENCOUNTER
Spoke with pt in regards to benign breast biopsy results. 6 month f/u is recommended. She verbalized understanding.

## 2024-02-23 NOTE — TELEPHONE ENCOUNTER
At least five of her medications are prone to muscle cramps or at least muscle problems.  They include the Aricept/donepezil, Claritin/loratadine, Norvasc/amlodipine, Sinequan/doxepin, and Crestor/rosuvastatin.  The Crestor is not as likely to cause cramping as just muscle aches in general but statins can do strange things to muscles.  All of these can be held for short periods of time without causing problems, the amlodipine could be a problem for her blood pressure if she holds it more than a day or two.  She can hold the Crestor as long as two weeks to see if the nighttime cramping stops without seriously affecting her cholesterol control.  The Claritin, doxepin, and Aricept should be able to be held for at least a week or 10 days.  Why does not she try stopping them one at a time if she has the patient's for it or all at once if she does not.  If she stops them all at once when the pain and cramping stops she will need to resume them one at a time and try to pin down which one causes the discomfort.  If none of them can be identified as the culprit we will have to try some restless leg treatments on her.  I would prefer not to throw more medicine at her if it turns out to be a side effect of another medicine, will just get rid of the culprit.

## 2024-02-26 ENCOUNTER — TELEPHONE (OUTPATIENT)
Dept: FAMILY MEDICINE | Facility: CLINIC | Age: 79
End: 2024-02-26
Payer: MEDICARE

## 2024-02-26 NOTE — TELEPHONE ENCOUNTER
Patient came to office- explained again how she will stop meds to see if cramping in legs resolves-see note 2/22/24.

## 2024-02-26 NOTE — TELEPHONE ENCOUNTER
----- Message from Renata Finney sent at 2/26/2024  8:12 AM CST -----  Contact: pt  Type: Needs Medical Advice  Who Called:  pt  Best Call Back Number: [phone]    Additional Information: Pt is calling the office regarding decreasing all meds at this time.Please call back and advise.

## 2024-02-27 ENCOUNTER — TELEPHONE (OUTPATIENT)
Dept: OTOLARYNGOLOGY | Facility: CLINIC | Age: 79
End: 2024-02-27
Payer: MEDICARE

## 2024-02-27 ENCOUNTER — TELEPHONE (OUTPATIENT)
Dept: FAMILY MEDICINE | Facility: CLINIC | Age: 79
End: 2024-02-27
Payer: MEDICARE

## 2024-02-27 NOTE — TELEPHONE ENCOUNTER
Patient rescheduled to more appropriate provider. Re-iterated previous advise per Dr. Myers. Patient would still like to be seen.

## 2024-02-27 NOTE — TELEPHONE ENCOUNTER
----- Message from Isabel Bowers sent at 2/27/2024  2:49 PM CST -----  Regarding: eca  Contact: pt  Type:  Sooner Appointment Request    Caller is requesting a sooner appointment.  Caller declined first available appointment listed below.  Caller will not accept being placed on the waitlist and is requesting a message be sent to doctor.    Name of Caller:  patient   When is the first available appointment?    Symptoms:  eca   Would the patient rather a call back or a response via [facility]?   Best Call Back Number:  [phone]    Additional Information:  call to schedule

## 2024-02-27 NOTE — TELEPHONE ENCOUNTER
Called pt back. Pt states that she was having an issue with her feet, not anything ENT. She will call back if any ENT issue. Thanks, Abi

## 2024-02-28 ENCOUNTER — OFFICE VISIT (OUTPATIENT)
Dept: FAMILY MEDICINE | Facility: CLINIC | Age: 79
End: 2024-02-28
Payer: MEDICARE

## 2024-02-28 VITALS
TEMPERATURE: 98 F | SYSTOLIC BLOOD PRESSURE: 124 MMHG | OXYGEN SATURATION: 100 % | DIASTOLIC BLOOD PRESSURE: 60 MMHG | BODY MASS INDEX: 26.36 KG/M2 | WEIGHT: 164 LBS | HEART RATE: 89 BPM | HEIGHT: 66 IN

## 2024-02-28 DIAGNOSIS — R41.3 MEMORY IMPAIRMENT: ICD-10-CM

## 2024-02-28 DIAGNOSIS — G62.9 NEUROPATHY: Primary | ICD-10-CM

## 2024-02-28 PROCEDURE — 99214 OFFICE O/P EST MOD 30 MIN: CPT | Mod: S$GLB,,, | Performed by: STUDENT IN AN ORGANIZED HEALTH CARE EDUCATION/TRAINING PROGRAM

## 2024-02-28 PROCEDURE — 99999 PR PBB SHADOW E&M-EST. PATIENT-LVL V: CPT | Mod: PBBFAC,,, | Performed by: STUDENT IN AN ORGANIZED HEALTH CARE EDUCATION/TRAINING PROGRAM

## 2024-02-28 NOTE — PROGRESS NOTES
[facility]    Subjective:  Chief Complaint:   Chief Complaint   Patient presents with    Dizziness    Numbness       History of Present Illness:  Erica is here for problem visit. Patient is new to me.     B/l hand and foot numbness / cramping x 6 months   Being worked up by PCP   EMG ordered  and scheduled   TSH low/T4 wnl, B12 wnl, ast/alt wnl  H/o DM A1c 5.8  Gabapentin 200 prescribed since 2014 - reports not taking constantly - not helping with symptoms     Patient here with sister. Concern for memory issues, did not remember making this appointment   Memory impairment  Started on donepezil 10 qhs in Dec and has not reported any improvement     Allergies:  Review of patient's allergies indicates:  No Known Allergies    Home Medications:  Current Outpatient Medications on File Prior to Visit   Medication Sig    amLODIPine (NORVASC) 2.5 MG tablet Take 1 tablet by mouth once daily    artificial tears (ISOPTO TEARS) 0.5 % ophthalmic solution 1 drop as needed.    blood sugar diagnostic (BLOOD GLUCOSE TEST) Strp True Metrix glucose strips check once daily    donepeziL (ARICEPT) 5 MG tablet Take 2 tablets (10 mg total) by mouth every evening.    doxepin (SINEQUAN) 25 MG capsule Take 1 capsule (25 mg total) by mouth every evening.    ergocalciferol (ERGOCALCIFEROL) 50,000 unit Cap Take one capsule twice monthly.    fluticasone propionate (FLONASE) 50 mcg/actuation nasal spray 1 spray (50 mcg total) by Each Nostril route once daily.    gabapentin (NEURONTIN) 100 MG capsule Take 2 capsules (200 mg total) by mouth every evening.    ibuprofen (ADVIL,MOTRIN) 600 MG tablet Take 1 tablet (600 mg total) by mouth every 6 (six) hours as needed for Pain.    levothyroxine (SYNTHROID) 75 MCG tablet Take 1 tablet (75 mcg total) by mouth before breakfast. Except half a tablet on Sundays    loratadine (CLARITIN) 10 mg tablet Take 1 tablet (10 mg total) by mouth once daily.    losartan (COZAAR) 100 MG tablet Take 1  tablet (100 mg total) by mouth once daily.    rosuvastatin (CRESTOR) 10 MG tablet Take 1 tablet (10 mg total) by mouth once daily.    thiamine 100 MG tablet Take 1 tablet (100 mg total) by mouth once daily.    tiZANidine (ZANAFLEX) 2 MG tablet Take 4 mg by mouth every evening.    valACYclovir (VALTREX) 500 MG tablet Take one tablet (500mg) twice daily for three days for acute viral outbreak.  Start at the earliest sign of an outbreak, do not wait for visible rash (supply is enough for two outbreaks)    blood-glucose meter kit True Metrix glucometer check glucose once daily    diazePAM (VALIUM) 2 MG tablet Take 1 tablet (2 mg total) by mouth nightly as needed (muscle spasm).     No current facility-administered medications on file prior to visit.       Past Medical History:   Diagnosis Date    Adenomatous polyp of colon 3/26/2012    Allergy     Anxiety     Cancer     Cataract     Colon polyp     Degenerative arthritis of knee 04/03/2012    Diverticulosis     Encounter for blood transfusion     GERD (gastroesophageal reflux disease)     History of thyroid cancer 2021    Hyperlipidemia     Hypertension     Lateral meniscal tear 5/20/2013    Multinodular goiter 03/26/2012    Myopathy, unspecified 01/18/2010    Tuberculosis      Past Surgical History:   Procedure Laterality Date    BREAST BIOPSY Left 2015    benign    CHOLECYSTECTOMY      COLONOSCOPY  12/02/2015    Dr. Britton, multiple polyps, recheck five years-three years if more than 2 polyps are adenomatous    COLONOSCOPY N/A 12/02/2015    COLONOSCOPY N/A 03/20/2019    Procedure: COLONOSCOPY;  Surgeon: Jose Britton MD;  Location: [facility];  Service: Endoscopy;  Laterality: N/A;    COLONOSCOPY N/A 05/20/2020    Dr. Britton; internal hemorrhoids; diverticulosis; polyps removed; repeat in 3 years    COLONOSCOPY N/A 11/16/2023    Procedure: COLONOSCOPY(instructions mailed 11/9);  Surgeon: Jose Britton MD;  Location: [facility];  Service: Endoscopy;   Laterality: N/A;    CYSTOSCOPY N/A 08/26/2020    Procedure: CYSTOSCOPY;  Surgeon: Charlotte Walters Jr., MD;  Location: [facility] OR;  Service: Urology;  Laterality: N/A;    DILATION AND CURETTAGE OF UTERUS      DISSECTION OF NECK Left 09/13/2021    Procedure: DISSECTION, NECK;  Surgeon: Ryan Torres MD;  Location: [facility] 2ND FLR;  Service: ENT;  Laterality: Left;    EPIDURAL STEROID INJECTION INTO LUMBAR SPINE N/A 7/20/2023    Procedure: Injection-steroid-epidural-lumbar;  Surgeon: Julián Chiang MD;  Location: [facility] OR;  Service: Pain Management;  Laterality: N/A;  L5-s1    ESOPHAGEAL MANOMETRY WITH MEASUREMENT OF IMPEDANCE N/A 08/22/2022    Procedure: MANOMETRY, ESOPHAGUS, WITH IMPEDANCE MEASUREMENT;  Surgeon: Pantera Mcguire MD;  Location: [facility] (4TH FLR);  Service: Endoscopy;  Laterality: N/A;  8/4 fully vaccinated; instructions mailed-st    ESOPHAGOGASTRODUODENOSCOPY N/A 05/20/2020    Dr. Britton; small hiatal hernia; gastritis; 8 gastric polyps removed    ESOPHAGOGASTRODUODENOSCOPY N/A 04/01/2022    Procedure: EGD (ESOPHAGOGASTRODUODENOSCOPY);  Surgeon: Jose Britton MD;  Location: [facility];  Service: Endoscopy;  Laterality: N/A;    FOOT SURGERY      HYSTERECTOMY      TVH/BSO > 20 years    INCISION OF VULVA Bilateral 8/4/2023    Procedure: EXCISION, CYST, LABIA AND OTHER INDICATED PROCEDURES;  Surgeon: Mat Beach MD;  Location: [facility] OR;  Service: OB/GYN;  Laterality: Bilateral;  EXCYSION OF VULVAR CYST    INJECTION, SACROILIAC JOINT Right 12/1/2023    Procedure: INJECTION,SACROILIAC JOINT;  Surgeon: Julián Chiang MD;  Location: [facility] OR;  Service: Anesthesiology;  Laterality: Right;  sacroiliac injection    INTRALUMINAL GASTROINTESTINAL TRACT IMAGING VIA CAPSULE N/A 06/04/2020    Procedure: IMAGING PROCEDURE, GI TRACT, INTRALUMINAL, VIA CAPSULE;  Surgeon: Jose Britton MD;  Location: [facility];  Service: Endoscopy;  Laterality: N/A;    KNEE SURGERY  06/06/2013    right knee tear Dr Iverson      LAPAROSCOPIC CHOLECYSTECTOMY N/A 09/17/2020    Procedure: CHOLECYSTECTOMY, LAPAROSCOPIC;  Surgeon: Forrest Veronica MD;  Location: [facility] OR;  Service: General;  Laterality: N/A;    LUNG LOBECTOMY  1966    right middle lobectomy, due to Tb    OOPHORECTOMY      SHOULDER SURGERY      francis     THYROIDECTOMY Bilateral 05/19/2021    Procedure: THYROIDECTOMY;  Surgeon: Jamia Amezquita MD;  [facility];  Service: General;  Laterality: Bilateral;    THYROIDECTOMY Bilateral 09/13/2021    Procedure: THYROIDECTOMY WITH INTRA-OP PTH;  Surgeon: Ryan Torres MD;  [facility];  Service: ENT;  Laterality: Bilateral;  NIM tube  Intraop PTH     Family History   Problem Relation Age of Onset    Colon cancer Other     Cancer Mother     Hypertension Mother     Hyperlipidemia Mother     Cancer Brother     Hypertension Father     Emphysema Father     Diabetes Son     Hypertension Son     Alcohol abuse Son     Diabetes Maternal Aunt     Alzheimer's disease Maternal Uncle     Cancer Maternal Grandmother     No Known Problems Daughter     Dementia Sister     Alzheimer's disease Sister     Breast cancer Sister 60    Obesity Paternal Uncle     Prostate cancer Other     Cancer Brother     Melanoma Neg Hx     Psoriasis Neg Hx     Lupus Neg Hx     Eczema Neg Hx     Amblyopia Neg Hx     Blindness Neg Hx     Cataracts Neg Hx     Glaucoma Neg Hx     Macular degeneration Neg Hx     Retinal detachment Neg Hx     Strabismus Neg Hx     Stroke Neg Hx     Thyroid cancer Neg Hx     Colon polyps Neg Hx     Ulcerative colitis Neg Hx     Stomach cancer Neg Hx     Rectal cancer Neg Hx      Social History     Tobacco Use    Smoking status: Never    Smokeless tobacco: Never   Substance Use Topics    Alcohol use: Not Currently     Comment: stopped 2019    Drug use: No            The patient's past medical history, surgical history, social history, family history, allergies and medications have been reviewed.    Review of  "Systems     10 point review of systems was conducted and only the pertinent positives and pertinent negatives are noted above in the HPI section.    Physical Examination  General appearance: alert, cooperative, no distress  HEENT: normocephalic, atraumatic, PERRLA   Lungs: normal effort   Skin: No rashes or abnormal skin lesions, no apparent jaundice or bruising  Extremities: Full ROM of all extremities    Neurological:alert, oriented, normal speech, no new focal findings or movement disorder noted from baseline      BP Readings from Last 3 Encounters:   02/28/24 124/60   02/16/24 138/68   02/06/24 126/71     Wt Readings from Last 3 Encounters:   02/28/24 74.4 kg (164 lb 0.4 oz)   02/16/24 74 kg (163 lb 4 oz)   02/06/24 73 kg (161 lb)     /60 (BP Location: Left arm, Patient Position: Sitting, BP Method: Medium (Manual))   Pulse 89   Temp 98.2 °F (36.8 °C) (Oral)   Ht 5' 6" (1.676 m)   Wt 74.4 kg (164 lb 0.4 oz)   SpO2 100%   BMI 26.47 kg/m²       274}  Laboratory: I have reviewed old labs below:       274}    Lab Results   Component Value Date    WBC 5.00 01/25/2024    HGB 12.3 01/25/2024    HCT 39.0 01/25/2024    MCV 93 01/25/2024     01/25/2024     01/30/2024    K 4.5 01/30/2024     01/30/2024    CALCIUM 8.3 (L) 01/30/2024    PHOS 4.6 (H) 08/09/2022    CO2 27 01/30/2024     (H) 01/30/2024    BUN 18 01/30/2024    CREATININE 1.2 01/30/2024    EGFRNORACEVR 46 (A) 01/30/2024    ANIONGAP 9 01/30/2024    PROT 6.9 01/30/2024    ALBUMIN 3.5 01/30/2024    BILITOT 0.5 01/30/2024    ALKPHOS 38 (L) 01/30/2024    ALT 15 01/30/2024    AST 18 01/30/2024    INR 1.0 08/16/2022    CHOL 154 08/08/2022    TRIG 43 08/08/2022    HDL 59 08/08/2022    LDLCALC 86.4 08/08/2022    TSH 0.322 (L) 10/25/2023    GLUF 104 05/22/2004    HGBA1C 5.8 (H) 10/25/2023      Lab reviewed by me: Particular labs of significance that I will monitor, workup, or treat to improve are mentioned below in diagnostic " impression remarks.    Imaging/EKG: I have reviewed the pertinent results and my findings are noted in remarks.     274}    CC:   Chief Complaint   Patient presents with    Dizziness    Numbness           274}    Assessment/Plan  Erica Masterson is a 78 y.o. female who presents to clinic with:  1. Neuropathy    2. Memory impairment          274}  Diagnostic Impression Remarks       78 y.o. female who presents to clinic today for problem visit     This is the extent of this pleasant patient's concerns at this present time.  I also discussed the importance of close follow up to discuss labs, change or modify her medications if needed, monitor side effects, and further evaluation of medical problems.     Additional workup planned: see labs ordered below.    See below for labs and meds ordered with associated diagnosis      1. Neuropathy  Continue gabapentin as ordered  Recommend voltaren gel, warm compress, massage     2. Memory impairment  - Ambulatory referral/consult to Neurology; Future  Labs otherwise unremarkable     RTC PRN  Please follow with your PCP for routine healthcare maintenance     Dulce Maria Nevarez MD, [facility]   Family Medicine Physician  02/28/2024

## 2024-02-29 ENCOUNTER — TELEPHONE (OUTPATIENT)
Dept: PAIN MEDICINE | Facility: CLINIC | Age: 79
End: 2024-02-29
Payer: MEDICARE

## 2024-02-29 NOTE — TELEPHONE ENCOUNTER
----- Message from Jigna Vásquez sent at 2/29/2024  2:44 PM CST -----  Regarding: Call back  Type:  Needs Medical Advice    Who Called:Pt    Would the patient rather a call back or a response via [facility]? Call back    Best Call Back Number: [phone] or  [phone]    Additional Information: Pt would like to reschedule procedure for 3/8/ to another day. Thank you

## 2024-03-01 ENCOUNTER — TELEPHONE (OUTPATIENT)
Dept: FAMILY MEDICINE | Facility: CLINIC | Age: 79
End: 2024-03-01
Payer: MEDICARE

## 2024-03-01 NOTE — TELEPHONE ENCOUNTER
----- Message from Jaky Mckee sent at 3/1/2024  2:54 PM CST -----  Contact: pt  Type:  Needs Medical Advice    Who Called: the patient  Symptoms (please be specific):  How long has patient had these symptoms:    Pharmacy name and phone #:    Would the patient rather a call back or a response via [facility]? call back  Best Call Back Number: [phone]   Additional Information: Pt states she would like to speak to nurse  Thanks

## 2024-03-01 NOTE — TELEPHONE ENCOUNTER
Spoke to patient.   She wanted to review her scheduled appts   for March   Verbalizes understanding

## 2024-03-04 ENCOUNTER — OFFICE VISIT (OUTPATIENT)
Dept: PHYSICAL MEDICINE AND REHAB | Facility: CLINIC | Age: 79
End: 2024-03-04
Payer: MEDICARE

## 2024-03-04 ENCOUNTER — TELEPHONE (OUTPATIENT)
Dept: PHYSICAL MEDICINE AND REHAB | Facility: CLINIC | Age: 79
End: 2024-03-04

## 2024-03-04 VITALS
DIASTOLIC BLOOD PRESSURE: 80 MMHG | WEIGHT: 164 LBS | BODY MASS INDEX: 26.36 KG/M2 | HEART RATE: 84 BPM | HEIGHT: 66 IN | SYSTOLIC BLOOD PRESSURE: 128 MMHG

## 2024-03-04 DIAGNOSIS — M54.16 LUMBAR RADICULOPATHY, CHRONIC: Primary | ICD-10-CM

## 2024-03-04 DIAGNOSIS — R20.0 NUMBNESS AND TINGLING: ICD-10-CM

## 2024-03-04 DIAGNOSIS — G62.9 POLYNEUROPATHY: ICD-10-CM

## 2024-03-04 DIAGNOSIS — R20.2 NUMBNESS AND TINGLING: ICD-10-CM

## 2024-03-04 PROCEDURE — 95886 MUSC TEST DONE W/N TEST COMP: CPT | Mod: S$GLB,,, | Performed by: STUDENT IN AN ORGANIZED HEALTH CARE EDUCATION/TRAINING PROGRAM

## 2024-03-04 PROCEDURE — 99999 PR PBB SHADOW E&M-EST. PATIENT-LVL III: CPT | Mod: PBBFAC,,, | Performed by: STUDENT IN AN ORGANIZED HEALTH CARE EDUCATION/TRAINING PROGRAM

## 2024-03-04 PROCEDURE — 95913 NRV CNDJ TEST 13/> STUDIES: CPT | Mod: S$GLB,,, | Performed by: STUDENT IN AN ORGANIZED HEALTH CARE EDUCATION/TRAINING PROGRAM

## 2024-03-04 PROCEDURE — 99499 UNLISTED E&M SERVICE: CPT | Mod: S$GLB,,, | Performed by: STUDENT IN AN ORGANIZED HEALTH CARE EDUCATION/TRAINING PROGRAM

## 2024-03-04 RX ORDER — GABAPENTIN 100 MG/1
1 CAPSULE ORAL
COMMUNITY
End: 2024-05-20 | Stop reason: SDUPTHER

## 2024-03-04 NOTE — TELEPHONE ENCOUNTER
Patient was advised to call PCP regarding any kind of medication that is needed for hands and feet.

## 2024-03-04 NOTE — PROGRESS NOTES
[facility]  Physical Medicine and Rehabilitation   [address] [place] [address], [address]  [place] [address]       Patient: Erica Masterson   Patient ID: [id number]   Sex: Female   Date of Birth:  [birth year]   Age:  78y   Notes:  Hx of n/t in all 4 extremities and chronic low back pain that is primarliy changed to leg pain. Hx of similar symptoms in the past. Also complains of neck pain that does not radiate into the hands from what she can recall. Hx of thyroid gland removal. Hx of EMG/NCS of the right upper and lower extremity in on 6/23/20 that showed possible evidence of a peripheral neuropathy.    Last visit date: 3/4/2024         Visit date and time: 3/4/2024 09:28   Patient Age on Visit Date:     Referring Physician: Valentina Jean NP   Diagnoses:  Lumbar radiculopathy and cervical radiculopathy         Sensory NCS      Nerve / Sites Rec. Site Onset Lat Peak Lat Ref. NP Amp Ref. PP Amp Ref. Segments Distance Velocity     ms ms ms µV µV µV µV  cm m/s   R Median - Digit II (Antidromic)      Wrist Dig II 2.60 3.44 ?3.40 15.9 ?15.0 41.5 ?20.0 Wrist - Dig II 13 50   R Ulnar - Digit V (Antidromic)      Wrist Dig V 2.40 3.18 ?3.10 24.7 ?10.0 40.6 ?15.0 Wrist - Dig V 11 46   R Radial - Anatomical snuff box (Forearm)      Forearm Wrist 1.88 2.50 ?2.90 23.7 ?15.0 44.7 ?15.0 Forearm - Wrist 10 53   L Sural - Ankle (Calf)      Calf Ankle 2.86 3.39 ?4.20 2.9 ?5.0 0.31 ?5.0 Calf - Ankle 14 49   R Sural - Ankle (Calf)      Calf Ankle NR NR ?4.20 NR ?5.0 NR ?5.0 Calf - Ankle 14 NR   L Superficial peroneal - Ankle      Lat leg Ankle 3.39 4.38 ?4.40 12.1 ?5.0  ?5.0 Lat leg - Ankle 14 41   R Superficial peroneal - Ankle      Lat leg Ankle 4.58 5.21 ?4.40 4.6 ?5.0 2.3 ?5.0 Lat leg - Ankle 14 31       Motor NCS      Nerve / Sites Muscle Latency Ref. Amplitude Ref. Amp % Duration Segments Distance Lat Diff Velocity Ref.     ms ms mV mV % ms  cm ms m/s m/s   R Median - APB      Wrist APB 2.97 ?4.40 11.6 ?4.0 100 5.57 Wrist - APB 7          Elbow APB 6.98  10.9  93.8 5.99 Elbow - Wrist 21 4.01 52 ?49   R Ulnar - ADM      Wrist ADM 2.71 ?3.60 7.9 ?5.0 100 5.89 Wrist - ADM 7         B.Elbow ADM 6.09  7.9  101 6.20 B.Elbow - Wrist 20 3.39 59 ?49   L Peroneal - EDB      Ankle EDB 4.06 ?6.20 3.9 ?2.0 100 5.26 Ankle - EDB 8         Fib head EDB 11.30  4.4  114 5.10 Fib head - Ankle 32 7.24 44 ?39      Pop fossa EDB 13.13  4.4  112 5.36 Pop fossa - Fib head 9 1.82 49 ?39   R Peroneal - EDB      Ankle EDB 5.00 ?6.20 3.6 ?2.0 100 5.00 Ankle - EDB 8         Fib head EDB 11.82  3.6  102 5.42 Fib head - Ankle 32 6.82 47 ?39      Pop fossa EDB 13.91  4.2  116 5.57 Pop fossa - Fib head 9 2.08 43 ?39   L Tibial - AH      Ankle AH 5.31 ?6.00 3.5 ?3.0 100 6.25 Ankle - AH 8         Pop fossa AH 13.70  3.8  110 6.51 Pop fossa - Ankle 33 8.39 39 ?39   R Tibial - AH      Ankle AH 5.21 ?6.00 3.6 ?3.0 100 4.90 Ankle - AH 8         Pop fossa AH 13.75  3.2  90.1 5.78 Pop fossa - Ankle 37 8.54 43 ?39       EMG Summary Table     Spontaneous MUAP Recruitment   Muscle Nerve Roots IA Fib PSW Fasc H.F. Amp Dur. PPP Pattern   R. Deltoid Axillary C5-C6 N None None None None N N N N   R. Biceps brachii Musculocutaneous C5-C6 N None None None None N N N N   R. Triceps brachii Radial C6-C8 N None None None None N N N N   R. Pronator teres Median C6-C7 N None None None None N N N N   R. First dorsal interosseous Ulnar C8-T1 N None None None None N N N N   R. Abductor pollicis brevis Median C8-T1 N None None None None N N N N   L. Vastus medialis Femoral L2-L4 N None None None None N N N N   R. Vastus medialis Femoral L2-L4 N None None None None N N N N   L. Tibialis anterior Deep peroneal (Fibular) L4-L5 N None None None None 1+ 1+ N N   R. Tibialis anterior Deep peroneal (Fibular) L4-L5 N None None None None 1+ 1+ N N   L. Tibialis posterior Tibial L4-L5 N None None None None 1+ 1+ N N   R. Tibialis posterior Tibial L4-L5 N None None None None 1+ 1+ N N   L. Peroneus longus  Perineal L5-S1 N None None None None N N N N   R. Peroneus longus Perineal L5-S1 N None None None None N N N N   L. Gastrocnemius (Medial head) Tibial S1-S2 N None None None None N N N N   R. Gastrocnemius (Medial head) Tibial S1-S2 N None None None None N N N N       Summary    The motor conduction test was normal in all 6 of the tested nerves: R Median - APB, R Ulnar - ADM, L Peroneal - EDB, R Peroneal - EDB, L Tibial - AH, R Tibial - AH.    The sensory conduction test was performed on 7 nerve(s). The results were normal in 2 nerve(s): R Radial - Anatomical snuff box (Forearm), L Superficial peroneal - Ankle. Results outside the specified normal range were found in 5 nerve(s), as follows:  In the R Median - Digit II (Antidromic) study  the peak latency result was increased for Wrist stimulation  In the R Ulnar - Digit V (Antidromic) study  the peak latency result was increased for Wrist stimulation  In the L Sural - Ankle (Calf) study  the peak amplitude result was reduced for Calf stimulation  In the R Sural - Ankle (Calf) study  the response was considered absent for Calf stimulation  In the R Superficial peroneal - Ankle study  the peak latency result was increased for Lat leg stimulation  the peak amplitude result was reduced for Lat leg stimulation    The needle EMG examination was performed in 16 muscles. It was normal in 12 muscle(s): R. Deltoid, R. Biceps brachii, R. Triceps brachii, R. Pronator teres, R. First dorsal interosseous, R. Abductor pollicis brevis, L. Vastus medialis, R. Vastus medialis, L. Peroneus longus, R. Peroneus longus, L. Gastrocnemius (Medial head), R. Gastrocnemius (Medial head). The study was abnormal in 4 muscle(s), with the following distribution:  The MUP waveform abnormality was found in L. Tibialis anterior, R. Tibialis anterior, L. Tibialis posterior, R. Tibialis posterior.    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 Unable to determine the presence of carpal tunnel syndrome in this polyneuropathy.      Plan: Discussed results with patient. Follow-up with referring provider for additional details.     ____________________________  Eitan Hogan MD

## 2024-03-06 ENCOUNTER — TELEPHONE (OUTPATIENT)
Dept: PAIN MEDICINE | Facility: CLINIC | Age: 79
End: 2024-03-06
Payer: MEDICARE

## 2024-03-06 ENCOUNTER — OFFICE VISIT (OUTPATIENT)
Dept: ORTHOPEDICS | Facility: CLINIC | Age: 79
End: 2024-03-06
Payer: MEDICARE

## 2024-03-06 ENCOUNTER — HOSPITAL ENCOUNTER (OUTPATIENT)
Dept: RADIOLOGY | Facility: HOSPITAL | Age: 79
Discharge: HOME | End: 2024-03-06
Attending: ORTHOPAEDIC SURGERY
Payer: MEDICARE

## 2024-03-06 VITALS — OXYGEN SATURATION: 99 % | WEIGHT: 164 LBS | BODY MASS INDEX: 26.36 KG/M2 | HEART RATE: 52 BPM | HEIGHT: 66 IN

## 2024-03-06 DIAGNOSIS — M79.672 PAIN IN BOTH FEET: Primary | ICD-10-CM

## 2024-03-06 DIAGNOSIS — M79.671 PAIN IN BOTH FEET: Primary | ICD-10-CM

## 2024-03-06 DIAGNOSIS — M79.671 PAIN IN BOTH FEET: ICD-10-CM

## 2024-03-06 DIAGNOSIS — M79.672 PAIN IN BOTH FEET: ICD-10-CM

## 2024-03-06 PROCEDURE — 99213 OFFICE O/P EST LOW 20 MIN: CPT | Mod: S$GLB,,, | Performed by: ORTHOPAEDIC SURGERY

## 2024-03-06 PROCEDURE — 73630 X-RAY EXAM OF FOOT: CPT | Mod: TC,50,PO

## 2024-03-06 PROCEDURE — 99999 PR PBB SHADOW E&M-EST. PATIENT-LVL IV: CPT | Mod: PBBFAC,,, | Performed by: ORTHOPAEDIC SURGERY

## 2024-03-06 PROCEDURE — 73630 X-RAY EXAM OF FOOT: CPT | Mod: 26,50,, | Performed by: RADIOLOGY

## 2024-03-06 NOTE — TELEPHONE ENCOUNTER
Called pt to inform her that our staff had to moved her appt from 4/5/24 due to Sania not being in the office   Appt was moved to the following date 4/9/24 @ 11:30 am

## 2024-03-06 NOTE — PROGRESS NOTES
Subjective:      Patient ID: Erica Masterson is a 78 y.o. female.    Chief Complaint: Pain of the Left Foot and Pain of the Right Foot    HPI  The patient is in with complaints of bilateral plantar feet pain and burning.  There has been no trauma.  She describes herself as prediabetic.  She has had no specific treatment for this.  ROS      Objective:    Ortho Exam     Constitutional:   Patient is alert  and oriented in no acute distress  HEENT:  normocephalic atraumatic; PERRL EOMI  Neck:  Supple without adenopathy  Cardiovascular:  Normal rate and rhythm  Pulmonary:  Normal respiratory effort normal chest wall expansion  Abdominal:  Nonprotuberant nondistended  Musculoskeletal:  The patient has a steady nonantalgic gait  Nice plantigrade feet she has intact skin, sensation, and brisk capillary refill of the digits  Neurological:  No focal defect; cranial nerves 2-12 grossly intact  Psychiatric/behavioral:  Mood and behavior normal           My Radiographs Findings:    Radiographs of both feet without acute osseous abnormalities  Assessment:       No diagnosis found.      Plan:       I have discussed medical condition and treatment options with her at length.  I have recommended some over-the-counter gel inserts.  I will get her into physical therapy program for general feet conditioning maybe some treatments for her suspected peripheral neuropathy.  She can certainly get with the family physician regarding any other treatments for her neuropathic pain if symptoms fail to improve may need to get her into 1 of the podiatrist or foot and ankle specialists.        Past Medical History:   Diagnosis Date    Adenomatous polyp of colon 3/26/2012    Allergy     Anxiety     Cancer     Cataract     Colon polyp     Degenerative arthritis of knee 04/03/2012    Diverticulosis     Encounter for blood transfusion     GERD (gastroesophageal reflux disease)     History of thyroid cancer 2021    Hyperlipidemia     Hypertension      Lateral meniscal tear 5/20/2013    Multinodular goiter 03/26/2012    Myopathy, unspecified 01/18/2010    Tuberculosis      Past Surgical History:   Procedure Laterality Date    BREAST BIOPSY Left 2015    benign    CHOLECYSTECTOMY      COLONOSCOPY  12/02/2015    Dr. Britton, multiple polyps, recheck five years-three years if more than 2 polyps are adenomatous    COLONOSCOPY N/A 12/02/2015    COLONOSCOPY N/A 03/20/2019    Procedure: COLONOSCOPY;  Surgeon: Jose Britton MD;  Location: [facility];  Service: Endoscopy;  Laterality: N/A;    COLONOSCOPY N/A 05/20/2020    Dr. Britton; internal hemorrhoids; diverticulosis; polyps removed; repeat in 3 years    COLONOSCOPY N/A 11/16/2023    Procedure: COLONOSCOPY(instructions mailed 11/9);  Surgeon: Jose Britton MD;  Location: [facility];  Service: Endoscopy;  Laterality: N/A;    CYSTOSCOPY N/A 08/26/2020    Procedure: CYSTOSCOPY;  Surgeon: Charlotte Walters Jr., MD;  Location: [facility] OR;  Service: Urology;  Laterality: N/A;    DILATION AND CURETTAGE OF UTERUS      DISSECTION OF NECK Left 09/13/2021    Procedure: DISSECTION, NECK;  Surgeon: Ryan Torres MD;  Location: [facility] 2ND FLR;  Service: ENT;  Laterality: Left;    EPIDURAL STEROID INJECTION INTO LUMBAR SPINE N/A 7/20/2023    Procedure: Injection-steroid-epidural-lumbar;  Surgeon: Julián Chiang MD;  Location: [facility] OR;  Service: Pain Management;  Laterality: N/A;  L5-s1    ESOPHAGEAL MANOMETRY WITH MEASUREMENT OF IMPEDANCE N/A 08/22/2022    Procedure: MANOMETRY, ESOPHAGUS, WITH IMPEDANCE MEASUREMENT;  Surgeon: Pantera Mcguire MD;  Location: [facility] (4TH FLR);  Service: Endoscopy;  Laterality: N/A;  8/4 fully vaccinated; instructions mailed-st    ESOPHAGOGASTRODUODENOSCOPY N/A 05/20/2020    Dr. Britton; small hiatal hernia; gastritis; 8 gastric polyps removed    ESOPHAGOGASTRODUODENOSCOPY N/A 04/01/2022    Procedure: EGD (ESOPHAGOGASTRODUODENOSCOPY);  Surgeon: Jose Britton MD;  Location: [facility];  Service:  Endoscopy;  Laterality: N/A;    FOOT SURGERY      HYSTERECTOMY      TVH/BSO > 20 years    INCISION OF VULVA Bilateral 8/4/2023    Procedure: EXCISION, CYST, LABIA AND OTHER INDICATED PROCEDURES;  Surgeon: Mat Beach MD;  Location: [facility] OR;  Service: OB/GYN;  Laterality: Bilateral;  EXCYSION OF VULVAR CYST    INJECTION, SACROILIAC JOINT Right 12/1/2023    Procedure: INJECTION,SACROILIAC JOINT;  Surgeon: Julián Chiang MD;  Location: [facility] OR;  Service: Anesthesiology;  Laterality: Right;  sacroiliac injection    INTRALUMINAL GASTROINTESTINAL TRACT IMAGING VIA CAPSULE N/A 06/04/2020    Procedure: IMAGING PROCEDURE, GI TRACT, INTRALUMINAL, VIA CAPSULE;  Surgeon: Jose Britton MD;  Location: [facility];  Service: Endoscopy;  Laterality: N/A;    KNEE SURGERY  06/06/2013    right knee tear Dr Iverson     LAPAROSCOPIC CHOLECYSTECTOMY N/A 09/17/2020    Procedure: CHOLECYSTECTOMY, LAPAROSCOPIC;  Surgeon: Forrest Veronica MD;  Location: [facility];  Service: General;  Laterality: N/A;    LUNG LOBECTOMY  1966    right middle lobectomy, due to Tb    OOPHORECTOMY      SHOULDER SURGERY      francis     THYROIDECTOMY Bilateral 05/19/2021    Procedure: THYROIDECTOMY;  Surgeon: Jamia Amezquita MD;  Location: [facility] OR [address] [place] [address];  Service: General;  Laterality: Bilateral;    THYROIDECTOMY Bilateral 09/13/2021    Procedure: THYROIDECTOMY WITH INTRA-OP PTH;  Surgeon: Ryan Torres MD;  Location: [facility] OR [address] [place] [address];  Service: ENT;  Laterality: Bilateral;  NIM tube  Intraop PTH         Current Outpatient Medications:     amLODIPine (NORVASC) 2.5 MG tablet, Take 1 tablet by mouth once daily, Disp: 90 tablet, Rfl: 0    artificial tears (ISOPTO TEARS) 0.5 % ophthalmic solution, 1 drop as needed., Disp: , Rfl:     blood sugar diagnostic (BLOOD GLUCOSE TEST) Strp, True Metrix glucose strips check once daily, Disp: 100 each, Rfl: 3    donepeziL (ARICEPT) 5 MG tablet, Take 2 tablets (10 mg total) by mouth every evening., Disp: 30 tablet,  Rfl: 5    doxepin (SINEQUAN) 25 MG capsule, Take 1 capsule (25 mg total) by mouth every evening., Disp: 90 capsule, Rfl: 3    ergocalciferol (ERGOCALCIFEROL) 50,000 unit Cap, Take one capsule twice monthly., Disp: 6 capsule, Rfl: 3    fluticasone propionate (FLONASE) 50 mcg/actuation nasal spray, 1 spray (50 mcg total) by Each Nostril route once daily., Disp: 16 g, Rfl: 5    gabapentin (NEURONTIN) 100 MG capsule, Take 2 capsules (200 mg total) by mouth every evening., Disp: 60 capsule, Rfl: 1    gabapentin (NEURONTIN) 100 MG capsule, 1 capsule., Disp: , Rfl:     ibuprofen (ADVIL,MOTRIN) 600 MG tablet, Take 1 tablet (600 mg total) by mouth every 6 (six) hours as needed for Pain., Disp: 30 tablet, Rfl: 0    levothyroxine (SYNTHROID) 75 MCG tablet, Take 1 tablet (75 mcg total) by mouth before breakfast. Except half a tablet on Sundays, Disp: 90 tablet, Rfl: 3    loratadine (CLARITIN) 10 mg tablet, Take 1 tablet (10 mg total) by mouth once daily., Disp: 30 tablet, Rfl: 5    losartan (COZAAR) 100 MG tablet, Take 1 tablet (100 mg total) by mouth once daily., Disp: 90 tablet, Rfl: 3    rosuvastatin (CRESTOR) 10 MG tablet, Take 1 tablet (10 mg total) by mouth once daily., Disp: 90 tablet, Rfl: 4    thiamine 100 MG tablet, Take 1 tablet (100 mg total) by mouth once daily., Disp: 30 tablet, Rfl: 5    tiZANidine (ZANAFLEX) 2 MG tablet, Take 4 mg by mouth every evening., Disp: , Rfl:     valACYclovir (VALTREX) 500 MG tablet, Take one tablet (500mg) twice daily for three days for acute viral outbreak.  Start at the earliest sign of an outbreak, do not wait for visible rash (supply is enough for two outbreaks), Disp: 12 tablet, Rfl: 11    blood-glucose meter kit, True Metrix glucometer check glucose once daily, Disp: 1 each, Rfl: 0    diazePAM (VALIUM) 2 MG tablet, Take 1 tablet (2 mg total) by mouth nightly as needed (muscle spasm)., Disp: 7 tablet, Rfl: 0    Review of patient's allergies indicates:  No Known Allergies    Family  History   Problem Relation Age of Onset    Colon cancer Other     Cancer Mother     Hypertension Mother     Hyperlipidemia Mother     Cancer Brother     Hypertension Father     Emphysema Father     Diabetes Son     Hypertension Son     Alcohol abuse Son     Diabetes Maternal Aunt     Alzheimer's disease Maternal Uncle     Cancer Maternal Grandmother     No Known Problems Daughter     Dementia Sister     Alzheimer's disease Sister     Breast cancer Sister 60    Obesity Paternal Uncle     Prostate cancer Other     Cancer Brother     Melanoma Neg Hx     Psoriasis Neg Hx     Lupus Neg Hx     Eczema Neg Hx     Amblyopia Neg Hx     Blindness Neg Hx     Cataracts Neg Hx     Glaucoma Neg Hx     Macular degeneration Neg Hx     Retinal detachment Neg Hx     Strabismus Neg Hx     Stroke Neg Hx     Thyroid cancer Neg Hx     Colon polyps Neg Hx     Ulcerative colitis Neg Hx     Stomach cancer Neg Hx     Rectal cancer Neg Hx      Social History     Occupational History    Not on file   Tobacco Use    Smoking status: Never    Smokeless tobacco: Never   Substance and Sexual Activity    Alcohol use: Not Currently     Comment: stopped 2019    Drug use: No    Sexual activity: Not Currently

## 2024-03-06 NOTE — H&P (VIEW-ONLY)
Subjective:      Patient ID: Erica Masterson is a 78 y.o. female.    Chief Complaint: Pain of the Left Foot and Pain of the Right Foot    HPI  The patient is in with complaints of bilateral plantar feet pain and burning.  There has been no trauma.  She describes herself as prediabetic.  She has had no specific treatment for this.  ROS      Objective:    Ortho Exam     Constitutional:   Patient is alert  and oriented in no acute distress  HEENT:  normocephalic atraumatic; PERRL EOMI  Neck:  Supple without adenopathy  Cardiovascular:  Normal rate and rhythm  Pulmonary:  Normal respiratory effort normal chest wall expansion  Abdominal:  Nonprotuberant nondistended  Musculoskeletal:  The patient has a steady nonantalgic gait  Nice plantigrade feet she has intact skin, sensation, and brisk capillary refill of the digits  Neurological:  No focal defect; cranial nerves 2-12 grossly intact  Psychiatric/behavioral:  Mood and behavior normal           My Radiographs Findings:    Radiographs of both feet without acute osseous abnormalities  Assessment:       No diagnosis found.      Plan:       I have discussed medical condition and treatment options with her at length.  I have recommended some over-the-counter gel inserts.  I will get her into physical therapy program for general feet conditioning maybe some treatments for her suspected peripheral neuropathy.  She can certainly get with the family physician regarding any other treatments for her neuropathic pain if symptoms fail to improve may need to get her into 1 of the podiatrist or foot and ankle specialists.        Past Medical History:   Diagnosis Date    Adenomatous polyp of colon 3/26/2012    Allergy     Anxiety     Cancer     Cataract     Colon polyp     Degenerative arthritis of knee 04/03/2012    Diverticulosis     Encounter for blood transfusion     GERD (gastroesophageal reflux disease)     History of thyroid cancer 2021    Hyperlipidemia     Hypertension      Lateral meniscal tear 5/20/2013    Multinodular goiter 03/26/2012    Myopathy, unspecified 01/18/2010    Tuberculosis      Past Surgical History:   Procedure Laterality Date    BREAST BIOPSY Left 2015    benign    CHOLECYSTECTOMY      COLONOSCOPY  12/02/2015    Dr. Britton, multiple polyps, recheck five years-three years if more than 2 polyps are adenomatous    COLONOSCOPY N/A 12/02/2015    COLONOSCOPY N/A 03/20/2019    Procedure: COLONOSCOPY;  Surgeon: Jose Britton MD;  Location: [facility];  Service: Endoscopy;  Laterality: N/A;    COLONOSCOPY N/A 05/20/2020    Dr. Britton; internal hemorrhoids; diverticulosis; polyps removed; repeat in 3 years    COLONOSCOPY N/A 11/16/2023    Procedure: COLONOSCOPY(instructions mailed 11/9);  Surgeon: Jose Britton MD;  Location: [facility];  Service: Endoscopy;  Laterality: N/A;    CYSTOSCOPY N/A 08/26/2020    Procedure: CYSTOSCOPY;  Surgeon: Charlotte Walters Jr., MD;  Location: [facility] OR;  Service: Urology;  Laterality: N/A;    DILATION AND CURETTAGE OF UTERUS      DISSECTION OF NECK Left 09/13/2021    Procedure: DISSECTION, NECK;  Surgeon: Ryan Torres MD;  Location: [facility] 2ND FLR;  Service: ENT;  Laterality: Left;    EPIDURAL STEROID INJECTION INTO LUMBAR SPINE N/A 7/20/2023    Procedure: Injection-steroid-epidural-lumbar;  Surgeon: Julián Chiang MD;  Location: [facility] OR;  Service: Pain Management;  Laterality: N/A;  L5-s1    ESOPHAGEAL MANOMETRY WITH MEASUREMENT OF IMPEDANCE N/A 08/22/2022    Procedure: MANOMETRY, ESOPHAGUS, WITH IMPEDANCE MEASUREMENT;  Surgeon: Pantera Mcguire MD;  Location: [facility] (4TH FLR);  Service: Endoscopy;  Laterality: N/A;  8/4 fully vaccinated; instructions mailed-st    ESOPHAGOGASTRODUODENOSCOPY N/A 05/20/2020    Dr. Britton; small hiatal hernia; gastritis; 8 gastric polyps removed    ESOPHAGOGASTRODUODENOSCOPY N/A 04/01/2022    Procedure: EGD (ESOPHAGOGASTRODUODENOSCOPY);  Surgeon: Jose Britton MD;  Location: [facility];  Service:  Endoscopy;  Laterality: N/A;    FOOT SURGERY      HYSTERECTOMY      TVH/BSO > 20 years    INCISION OF VULVA Bilateral 8/4/2023    Procedure: EXCISION, CYST, LABIA AND OTHER INDICATED PROCEDURES;  Surgeon: Mat Beach MD;  Location: [facility] OR;  Service: OB/GYN;  Laterality: Bilateral;  EXCYSION OF VULVAR CYST    INJECTION, SACROILIAC JOINT Right 12/1/2023    Procedure: INJECTION,SACROILIAC JOINT;  Surgeon: Julián Chiang MD;  Location: [facility] OR;  Service: Anesthesiology;  Laterality: Right;  sacroiliac injection    INTRALUMINAL GASTROINTESTINAL TRACT IMAGING VIA CAPSULE N/A 06/04/2020    Procedure: IMAGING PROCEDURE, GI TRACT, INTRALUMINAL, VIA CAPSULE;  Surgeon: Jose Britton MD;  Location: [facility];  Service: Endoscopy;  Laterality: N/A;    KNEE SURGERY  06/06/2013    right knee tear Dr Iverson     LAPAROSCOPIC CHOLECYSTECTOMY N/A 09/17/2020    Procedure: CHOLECYSTECTOMY, LAPAROSCOPIC;  Surgeon: Forrest Veronica MD;  Location: [facility];  Service: General;  Laterality: N/A;    LUNG LOBECTOMY  1966    right middle lobectomy, due to Tb    OOPHORECTOMY      SHOULDER SURGERY      francis     THYROIDECTOMY Bilateral 05/19/2021    Procedure: THYROIDECTOMY;  Surgeon: Jamia Amezquita MD;  Location: [facility] OR [address] [place] [address];  Service: General;  Laterality: Bilateral;    THYROIDECTOMY Bilateral 09/13/2021    Procedure: THYROIDECTOMY WITH INTRA-OP PTH;  Surgeon: Ryan Torres MD;  Location: [facility] OR [address] [place] [address];  Service: ENT;  Laterality: Bilateral;  NIM tube  Intraop PTH         Current Outpatient Medications:     amLODIPine (NORVASC) 2.5 MG tablet, Take 1 tablet by mouth once daily, Disp: 90 tablet, Rfl: 0    artificial tears (ISOPTO TEARS) 0.5 % ophthalmic solution, 1 drop as needed., Disp: , Rfl:     blood sugar diagnostic (BLOOD GLUCOSE TEST) Strp, True Metrix glucose strips check once daily, Disp: 100 each, Rfl: 3    donepeziL (ARICEPT) 5 MG tablet, Take 2 tablets (10 mg total) by mouth every evening., Disp: 30 tablet,  Rfl: 5    doxepin (SINEQUAN) 25 MG capsule, Take 1 capsule (25 mg total) by mouth every evening., Disp: 90 capsule, Rfl: 3    ergocalciferol (ERGOCALCIFEROL) 50,000 unit Cap, Take one capsule twice monthly., Disp: 6 capsule, Rfl: 3    fluticasone propionate (FLONASE) 50 mcg/actuation nasal spray, 1 spray (50 mcg total) by Each Nostril route once daily., Disp: 16 g, Rfl: 5    gabapentin (NEURONTIN) 100 MG capsule, Take 2 capsules (200 mg total) by mouth every evening., Disp: 60 capsule, Rfl: 1    gabapentin (NEURONTIN) 100 MG capsule, 1 capsule., Disp: , Rfl:     ibuprofen (ADVIL,MOTRIN) 600 MG tablet, Take 1 tablet (600 mg total) by mouth every 6 (six) hours as needed for Pain., Disp: 30 tablet, Rfl: 0    levothyroxine (SYNTHROID) 75 MCG tablet, Take 1 tablet (75 mcg total) by mouth before breakfast. Except half a tablet on Sundays, Disp: 90 tablet, Rfl: 3    loratadine (CLARITIN) 10 mg tablet, Take 1 tablet (10 mg total) by mouth once daily., Disp: 30 tablet, Rfl: 5    losartan (COZAAR) 100 MG tablet, Take 1 tablet (100 mg total) by mouth once daily., Disp: 90 tablet, Rfl: 3    rosuvastatin (CRESTOR) 10 MG tablet, Take 1 tablet (10 mg total) by mouth once daily., Disp: 90 tablet, Rfl: 4    thiamine 100 MG tablet, Take 1 tablet (100 mg total) by mouth once daily., Disp: 30 tablet, Rfl: 5    tiZANidine (ZANAFLEX) 2 MG tablet, Take 4 mg by mouth every evening., Disp: , Rfl:     valACYclovir (VALTREX) 500 MG tablet, Take one tablet (500mg) twice daily for three days for acute viral outbreak.  Start at the earliest sign of an outbreak, do not wait for visible rash (supply is enough for two outbreaks), Disp: 12 tablet, Rfl: 11    blood-glucose meter kit, True Metrix glucometer check glucose once daily, Disp: 1 each, Rfl: 0    diazePAM (VALIUM) 2 MG tablet, Take 1 tablet (2 mg total) by mouth nightly as needed (muscle spasm)., Disp: 7 tablet, Rfl: 0    Review of patient's allergies indicates:  No Known Allergies    Family  History   Problem Relation Age of Onset    Colon cancer Other     Cancer Mother     Hypertension Mother     Hyperlipidemia Mother     Cancer Brother     Hypertension Father     Emphysema Father     Diabetes Son     Hypertension Son     Alcohol abuse Son     Diabetes Maternal Aunt     Alzheimer's disease Maternal Uncle     Cancer Maternal Grandmother     No Known Problems Daughter     Dementia Sister     Alzheimer's disease Sister     Breast cancer Sister 60    Obesity Paternal Uncle     Prostate cancer Other     Cancer Brother     Melanoma Neg Hx     Psoriasis Neg Hx     Lupus Neg Hx     Eczema Neg Hx     Amblyopia Neg Hx     Blindness Neg Hx     Cataracts Neg Hx     Glaucoma Neg Hx     Macular degeneration Neg Hx     Retinal detachment Neg Hx     Strabismus Neg Hx     Stroke Neg Hx     Thyroid cancer Neg Hx     Colon polyps Neg Hx     Ulcerative colitis Neg Hx     Stomach cancer Neg Hx     Rectal cancer Neg Hx      Social History     Occupational History    Not on file   Tobacco Use    Smoking status: Never    Smokeless tobacco: Never   Substance and Sexual Activity    Alcohol use: Not Currently     Comment: stopped 2019    Drug use: No    Sexual activity: Not Currently

## 2024-03-07 ENCOUNTER — TELEPHONE (OUTPATIENT)
Dept: ORTHOPEDICS | Facility: CLINIC | Age: 79
End: 2024-03-07
Payer: MEDICARE

## 2024-03-07 NOTE — TELEPHONE ENCOUNTER
Spoke with pt and she stated she did not want to do PT at this time . Stated Aleve is helping for now.

## 2024-03-12 ENCOUNTER — CLINICAL SUPPORT (OUTPATIENT)
Dept: REHABILITATION | Facility: HOSPITAL | Age: 79
End: 2024-03-12
Attending: ORTHOPAEDIC SURGERY
Payer: MEDICARE

## 2024-03-12 DIAGNOSIS — M25.60 RANGE OF MOTION DEFICIT: Primary | ICD-10-CM

## 2024-03-12 DIAGNOSIS — M79.671 PAIN IN BOTH FEET: ICD-10-CM

## 2024-03-12 DIAGNOSIS — M79.672 PAIN IN BOTH FEET: ICD-10-CM

## 2024-03-12 PROCEDURE — 97530 THERAPEUTIC ACTIVITIES: CPT | Mod: PN

## 2024-03-12 PROCEDURE — 97161 PT EVAL LOW COMPLEX 20 MIN: CPT | Mod: PN

## 2024-03-15 PROBLEM — M25.60 RANGE OF MOTION DEFICIT: Status: ACTIVE | Noted: 2024-03-15

## 2024-03-15 NOTE — PLAN OF CARE
[facility]   Physical Therapy Initial Evaluation      Name: Erica Masterson  [facility] Number: [id number]    Therapy Diagnosis:   Encounter Diagnoses   Name Primary?    Pain in both feet     Range of motion deficit Yes        Physician: Jacinto Palm,*    Physician Orders: PT Eval and treat   Medical Diagnosis from Referral:   Diagnosis   M79.671,M79.672 (ICD-10-CM) - Pain in both feet     Evaluation Date: 3/12/2024  Authorization Period Expiration: 12/31/2024   Plan of Care Expiration: 4/9/2024  Progress Note Due: 4/12/2024  Visit # / Visits authorized: 1/ 1   FOTO: 1/ 3    Precautions: Standard , HTN    Time In: 500pm  Time Out: 555pm  Total Billable Time: 55 minutes    Subjective     Date of onset: a few months     History of current condition - Erica reports: aches and pains all over but today she came in for cramping in her lower legs. When she wakes in the morning everything past her knees starts cramping and she has to wait for it to pass (5-8 min). She also feels burning in her feet. She reports a numb feeling as well.   This has started over the past few months with no Mechanism of injury. No change in her daily schedule. She is active and walks frequently and does work out classes a few times a week.  Her neck is still tight and she still has symptoms from before, but she continues her Home Exercise Program and it does help. She will be getting another injection from that soon and hope that will help with her entire body symptoms.   She is on a new mediation that helps her to sleep at night but she still gets the burning and cramping in the morning.   Her feet are tender and sore throughout the day but don't get cramps during the day.   She does not feel limited by this other than it taking her longer to get going in the morning.    Falls: none    Imaging: see imaging section    Prior Therapy: for her tobias and shoulders  Social History: Lives alone  Occupation: retired  Prior  Level of Function: no pain or cramping affecting quality of life  Current Level of Function: pain and limitation affecting quality of life    Pain:  Current 0/10, worst 5/10, best 0/10   Location: Bilateral feet and Lower Extremity    Description: cramping and burning, numbness  Aggravating Factors: waking in the morning  Easing Factors: rest    Patients goals: hopes to stretch out more      Medical History:   Past Medical History:   Diagnosis Date    Adenomatous polyp of colon 3/26/2012    Allergy     Anxiety     Cancer     Cataract     Colon polyp     Degenerative arthritis of knee 04/03/2012    Diverticulosis     Encounter for blood transfusion     GERD (gastroesophageal reflux disease)     History of thyroid cancer 2021    Hyperlipidemia     Hypertension     Lateral meniscal tear 5/20/2013    Multinodular goiter 03/26/2012    Myopathy, unspecified 01/18/2010    Tuberculosis        Surgical History:   Erica Masterson  has a past surgical history that includes Foot surgery; Shoulder surgery; Lung lobectomy (1966); Knee surgery (06/06/2013); Oophorectomy; Esophagogastroduodenoscopy (N/A, 05/20/2020); Breast biopsy (Left, 2015); Colonoscopy (12/02/2015); Colonoscopy (N/A, 12/02/2015); Colonoscopy (N/A, 03/20/2019); Colonoscopy (N/A, 05/20/2020); Intraluminal gastrointestinal tract imaging via capsule (N/A, 06/04/2020); Cystoscopy (N/A, 08/26/2020); Laparoscopic cholecystectomy (N/A, 09/17/2020); Thyroidectomy (Bilateral, 05/19/2021); Thyroidectomy (Bilateral, 09/13/2021); Dissection of neck (Left, 09/13/2021); Hysterectomy; Cholecystectomy; Esophagogastroduodenoscopy (N/A, 04/01/2022); Esophageal manometry with measurement of impedance (N/A, 08/22/2022); Dilation and curettage of uterus; Epidural steroid injection into lumbar spine (N/A, 7/20/2023); Incision of vulva (Bilateral, 8/4/2023); Colonoscopy (N/A, 11/16/2023); and injection, sacroiliac joint (Right, 12/1/2023).    Medications:   Erica has a current  medication list which includes the following prescription(s): amlodipine, isopto tears, blood glucose test, blood-glucose meter, diazepam, donepezil, doxepin, ergocalciferol, fluticasone propionate, gabapentin, gabapentin, ibuprofen, levothyroxine, loratadine, losartan, rosuvastatin, thiamine, tizanidine, and valacyclovir.    Allergies:   Review of patient's allergies indicates:  No Known Allergies     Objective      Observation:  Patient is an 78 year old female presenting alert and oriented.     Posture:  Knee valgus    Gait:  Bilateral hip drop    Range of motion:  Bilateral ankle DF 0 degrees, all other ankle motions WNL.    Strength:  4+/5 for Bilateral ankle inversion, eversion, DF and PF.    Joint mobility:   Hypomobile Talocrural joints bilaterally    Special Tests:   Left  Right    Slump test Positive for reproduction of numbness  Negative      Functional Assessment:  30 second sit to stand test: 17 reps with no hands      Intake Outcome Measure for FOTO Foot Survey    Therapist reviewed FOTO scores for Erica Masterson on 3/12/2024.   FOTO report - see Media section or FOTO account episode details.    Intake Score: 77%  Predicted Score: 77%         Treatment     Total Treatment time (time-based codes) separate from Evaluation: 23 minutes     Erica received the treatments listed below:      therapeutic activities to improve functional performance for 23 minutes, including:    Home Exercise Program education  Heel raises  Sit to stands  Slump sliders  Towel scrunches  DF self mobilization on step       Patient Education and Home Exercises     Education provided:   - Home Exercise Program, diagnosis, prognosis, Plan of care     Written Home Exercises Provided: yes. Exercises were reviewed and Erica was able to demonstrate them prior to the end of the session.  Erica demonstrated good  understanding of the education provided. See EMR under Patient Instructions for exercises provided during therapy  sessions.    Assessment     Erica is a 78 y.o. female referred to outpatient Physical Therapy with a medical diagnosis of pain in both feet. Patient presents with Lower Extremity burning, numbness and cramping in the morning when she gets out of bed. She presents with Bilateral hypomobility in both Talocrural joints as well as functional gait deviations.    Patient prognosis is Fair.   Patient will benefit from skilled outpatient Physical Therapy to address the deficits stated above and in the chart below, provide patient /family education, and to maximize patientt's level of independence.     Plan of care discussed with patient: Yes  Patient's spiritual, cultural and educational needs considered and patient is agreeable to the plan of care and goals as stated below:     Anticipated Barriers for therapy: age, co-morbidities    Medical Necessity is demonstrated by the following  History  Co-morbidities and personal factors that may impact the plan of care [] LOW: no personal factors / co-morbidities  [] MODERATE: 1-2 personal factors / co-morbidities  [x] HIGH: 3+ personal factors / co-morbidities    Moderate / High Support Documentation:   Co-morbidities affecting plan of care: anxiety, cancer, HTN    Personal Factors:   no deficits     Examination  Body Structures and Functions, activity limitations and participation restrictions that may impact the plan of care [] LOW: addressing 1-2 elements  [] MODERATE: 3+ elements  [x] HIGH: 4+ elements (please support below)    Moderate / High Support Documentation: balance, gait, sensation, neural tension     Clinical Presentation [x] LOW: stable  [] MODERATE: Evolving  [] HIGH: Unstable     Decision Making/ Complexity Score: low       Goals:  Short Term Goals: 2 weeks   Patient will be independent in Home Exercise Program to support improved Lower Extremity mobility and symptoms.    Long Term Goals: 4 weeks   Patient will be independent in progressed Home Exercise  Program.  Patient goal: hopes to stretch out more.  Patient will have improved FOTO to predicted level to show true functional improvements.  Patient will have decreased frequency of cramping in the morning and less burning in her feet to increase tolerance of weight bearing early in the morning.     Plan     Plan of care Certification: 3/12/2024 to 4/9/2024.    Outpatient Physical Therapy 1 times weekly for 4 weeks to include the following interventions: Gait Training, Manual Therapy, Moist Heat/ Ice, Neuromuscular Re-ed, Patient Education, Therapeutic Activities, and Therapeutic Exercise.     Lauren Knxo, PT, DPT        Physician's Signature: _________________________________________ Date: ________________

## 2024-03-18 ENCOUNTER — CLINICAL SUPPORT (OUTPATIENT)
Dept: REHABILITATION | Facility: HOSPITAL | Age: 79
End: 2024-03-18
Payer: MEDICARE

## 2024-03-18 DIAGNOSIS — M25.60 RANGE OF MOTION DEFICIT: Primary | ICD-10-CM

## 2024-03-18 PROCEDURE — 97530 THERAPEUTIC ACTIVITIES: CPT | Mod: PN

## 2024-03-18 NOTE — PROGRESS NOTES
"[facility]   Physical Therapy Treatment Note     Name: Erica Masterson  Clinic Number: [id number]    Therapy Diagnosis:   Encounter Diagnosis   Name Primary?    Range of motion deficit Yes     Physician: Jacinto Palm,*    Visit Date: 3/18/2024    Physician Orders: PT Eval and treat   Medical Diagnosis from Referral:   Diagnosis   M79.671,M79.672 (ICD-10-CM) - Pain in both feet      Evaluation Date: 3/12/2024  Authorization Period Expiration: 12/31/2024   Plan of Care Expiration: 4/9/2024  Progress Note Due: 4/12/2024  Visit # / Visits authorized: 1/ 10   FOTO: 1/ 3    FOTO 1st: 77%  Predicted Score: 77%  FOTO 3rd:  FOTO 10th:    Time in: 400pm  Time out: 450pm  Total Billable Time: 30 minutes    Precautions:  Standard , HTN       SUBJECTIVE     Pt reports: as of yesterday she was feeling a little better. Less intense cramping. Gets her injection on Friday.     She was compliant with home exercise program.  Response to previous treatment: ongoing  Functional change: ongoing    Pain: 3/10  Location: Bilateral feet     OBJECTIVE     Objective Measures updated at progress report unless specified.     Treatment       Erica received the treatments listed below:      Therapeutic exercises to develop strength, endurance, ROM, flexibility, and posture for 10 minutes including:    DF self mobilization 20" x 3 Bilateral   Knee straight calf stretch 20" x 3 Bilateral     Neuromuscular re-education activities to improve: Balance, Coordination, and Proprioception for 10 minutes. The following activities were included:    Slump slider x 30 Bilateral   Bridges 2 x 15 with 5" hold  Clamshell 3 x 15, supine, green band     Therapeutic activities to improve functional performance for 30 minutes, including:    Recumbent bike 10' level 2 for activity tolerance  Mini squats 3 x 10  Standing double leg heel raises 2 x 20  Single leg balance 30" x 3 Bilateral       Patient Education and Home Exercises "     Home Exercises Provided and Patient Education Provided     Education provided:   - Home Exercise Program     Written Home Exercises Provided: Patient instructed to cont prior HEP. Exercises were reviewed and Erica was able to demonstrate them prior to the end of the session.  Erica demonstrated good  understanding of the education provided. See EMR under Patient Instructions for exercises provided during therapy sessions    ASSESSMENT     Erica presented with reports of improved symptoms. She tolerated interventions well aimed at Lower Extremity mobility and strength.     Erica is progressing well towards her goals.     Pt prognosis is Fair.     Pt will continue to benefit from skilled outpatient physical therapy to address the deficits listed in the problem list box on initial evaluation, provide pt/family education and to maximize pt's level of independence in the home and community environment.     Pt's spiritual, cultural and educational needs considered and pt agreeable to plan of care and goals.     Anticipated barriers to physical therapy: age, co-morbidities     Goals:   Short Term Goals: 2 weeks -Progressing, not met   Patient will be independent in Home Exercise Program to support improved Lower Extremity mobility and symptoms.     Long Term Goals: 4 weeks -Progressing, not met   Patient will be independent in progressed Home Exercise Program.  Patient goal: hopes to stretch out more.  Patient will have improved FOTO to predicted level to show true functional improvements.  Patient will have decreased frequency of cramping in the morning and less burning in her feet to increase tolerance of weight bearing early in the morning.     PLAN   Plan of care Certification: 3/12/2024 to 4/9/2024.     Progress as tolerated with mobility and functional strengthening.    Lauren Knox, PT , DPT

## 2024-03-19 ENCOUNTER — OFFICE VISIT (OUTPATIENT)
Dept: OTOLARYNGOLOGY | Facility: CLINIC | Age: 79
End: 2024-03-19
Payer: MEDICARE

## 2024-03-19 DIAGNOSIS — C73 THYROID CANCER: Primary | Chronic | ICD-10-CM

## 2024-03-19 PROCEDURE — 99999 PR PBB SHADOW E&M-EST. PATIENT-LVL III: CPT | Mod: PBBFAC,,, | Performed by: OTOLARYNGOLOGY

## 2024-03-19 PROCEDURE — 99499 UNLISTED E&M SERVICE: CPT | Mod: S$GLB,,, | Performed by: OTOLARYNGOLOGY

## 2024-03-19 NOTE — PROGRESS NOTES
Chief Complaint   Patient presents with    6month f/u       78 y.o. female presents for follow up.  She has no complaints today, doing well. Denies ear pain, sore throat, trouble swallowing, neck stiffness, neck pain, change in voice, headache. She is followed by Endocrinology - next appt next month.     Chart review:  Thyroid ultrasound 11/3/23 - Residual or recurrent thyroid tissue is not seen. A calcification is seen in the left thyroid bed. Two normal appearing lymph nodes on the right   Thyroglobulin labs: 5/22/23 16 -- 10/25/23 7.2 -- 11/20/23 4.8   10/25/23 Endocrinology appt with VIKA Robbins - continue to monitor with labs and ultrasound every 6 months, next appt scheduled for 4/26/24      Oncology History   Thyroid cancer   5/19/2021 Initial Diagnosis    Thyroid cancer     5/19/2021 Surgery    Total thyroidectomy (Dr. Amezquita)     8/10/2021 Cancer Staged    Staging form: Thyroid - Differentiated, AJCC 8th Edition  - Clinical stage from 8/10/2021: Stage III (cT4a, cNX, cM0, Age at diagnosis: >= 55 years)     9/13/2021 Surgery    1) Completion total thyroidectomy with bilateral paratracheal and superior mediastinal dissection  2) Left modified neck dissection of levels II-Vb       9/27/2021 Cancer Staged    Staging form: Thyroid - Differentiated, AJCC 8th Edition  - Pathologic stage from 9/27/2021: Stage III (pT4a, pN1b, cM0, Age at diagnosis: >= 55 years)     12/15/2021 -  Radiation Therapy    Treating physician: Dr. Landrum, Dr. Yepez  Total Dose: 100 mCi COOPER             Review of Systems     Constitutional: Negative for fatigue and unexpected weight change.   HENT: Negative for sore throat, difficulty swallowing, ear pain, voice change  Eyes: Negative for visual disturbance.   Respiratory: Negative for shortness of breath, hemoptysis  Cardiovascular: Negative for chest pain and palpitations.   Musculoskeletal: Negative for decreased ROM, back pain.   Skin: Negative for rash.  Neurological: Negative for  dizziness.  Hematological: Does not bruise/bleed easily.   Psychiatric/Behavioral: Negative for agitation. The patient is not nervous/anxious.   Endocrine: Negative for rapid weight loss/weight gain, heat/cold intolerance.     Past Medical History:   Diagnosis Date    Adenomatous polyp of colon 3/26/2012    Allergy     Anxiety     Cancer     Cataract     Colon polyp     Degenerative arthritis of knee 04/03/2012    Diverticulosis     Encounter for blood transfusion     GERD (gastroesophageal reflux disease)     History of thyroid cancer 2021    Hyperlipidemia     Hypertension     Lateral meniscal tear 5/20/2013    Multinodular goiter 03/26/2012    Myopathy, unspecified 01/18/2010    Tuberculosis        Past Surgical History:   Procedure Laterality Date    BREAST BIOPSY Left 2015    benign    CHOLECYSTECTOMY      COLONOSCOPY  12/02/2015    Dr. Britton, multiple polyps, recheck five years-three years if more than 2 polyps are adenomatous    COLONOSCOPY N/A 12/02/2015    COLONOSCOPY N/A 03/20/2019    Procedure: COLONOSCOPY;  Surgeon: Jose Britton MD;  Location: [facility];  Service: Endoscopy;  Laterality: N/A;    COLONOSCOPY N/A 05/20/2020    Dr. Britton; internal hemorrhoids; diverticulosis; polyps removed; repeat in 3 years    COLONOSCOPY N/A 11/16/2023    Procedure: COLONOSCOPY(instructions mailed 11/9);  Surgeon: Jose Britton MD;  Location: [facility];  Service: Endoscopy;  Laterality: N/A;    CYSTOSCOPY N/A 08/26/2020    Procedure: CYSTOSCOPY;  Surgeon: Charlotte Walters Jr., MD;  Location: [facility] OR;  Service: Urology;  Laterality: N/A;    DILATION AND CURETTAGE OF UTERUS      DISSECTION OF NECK Left 09/13/2021    Procedure: DISSECTION, NECK;  Surgeon: Ryan Torres MD;  [address];  Service: ENT;  Laterality: Left;    EPIDURAL STEROID INJECTION INTO LUMBAR SPINE N/A 7/20/2023    Procedure: Injection-steroid-epidural-lumbar;  Surgeon: Julián Chiang MD;  Location: [facility] OR;  Service: Pain  Management;  Laterality: N/A;  L5-s1    ESOPHAGEAL MANOMETRY WITH MEASUREMENT OF IMPEDANCE N/A 08/22/2022    Procedure: MANOMETRY, ESOPHAGUS, WITH IMPEDANCE MEASUREMENT;  Surgeon: Pantera Mcguire MD;  Location: [facility] ENDO (4TH FLR);  Service: Endoscopy;  Laterality: N/A;  8/4 fully vaccinated; instructions mailed-st    ESOPHAGOGASTRODUODENOSCOPY N/A 05/20/2020    Dr. Britton; small hiatal hernia; gastritis; 8 gastric polyps removed    ESOPHAGOGASTRODUODENOSCOPY N/A 04/01/2022    Procedure: EGD (ESOPHAGOGASTRODUODENOSCOPY);  Surgeon: Jose Britton MD;  Location: [facility];  Service: Endoscopy;  Laterality: N/A;    FOOT SURGERY      HYSTERECTOMY      TVH/BSO > 20 years    INCISION OF VULVA Bilateral 8/4/2023    Procedure: EXCISION, CYST, LABIA AND OTHER INDICATED PROCEDURES;  Surgeon: Mat Beach MD;  Location: [facility] OR;  Service: OB/GYN;  Laterality: Bilateral;  EXCYSION OF VULVAR CYST    INJECTION, SACROILIAC JOINT Right 12/1/2023    Procedure: INJECTION,SACROILIAC JOINT;  Surgeon: Julián Chiang MD;  Location: [facility] OR;  Service: Anesthesiology;  Laterality: Right;  sacroiliac injection    INTRALUMINAL GASTROINTESTINAL TRACT IMAGING VIA CAPSULE N/A 06/04/2020    Procedure: IMAGING PROCEDURE, GI TRACT, INTRALUMINAL, VIA CAPSULE;  Surgeon: Jose Britton MD;  Location: [facility];  Service: Endoscopy;  Laterality: N/A;    KNEE SURGERY  06/06/2013    right knee tear Dr Iverson     LAPAROSCOPIC CHOLECYSTECTOMY N/A 09/17/2020    Procedure: CHOLECYSTECTOMY, LAPAROSCOPIC;  Surgeon: Forrest Veronica MD;  Location: [facility] OR;  Service: General;  Laterality: N/A;    LUNG LOBECTOMY  1966    right middle lobectomy, due to Tb    OOPHORECTOMY      SHOULDER SURGERY      francis     THYROIDECTOMY Bilateral 05/19/2021    Procedure: THYROIDECTOMY;  Surgeon: Jamia Amezquita MD;  Location: [facility] 2ND FLR;  Service: General;  Laterality: Bilateral;    THYROIDECTOMY Bilateral 09/13/2021    Procedure: THYROIDECTOMY WITH INTRA-OP  PTH;  Surgeon: Ryan Torres MD;  Location: [facility] OR [address] [place] [address];  Service: ENT;  Laterality: Bilateral;  NIM tube  Intraop PTH       family history includes Alcohol abuse in her son; Alzheimer's disease in her maternal uncle and sister; Breast cancer (age of onset: 60) in her sister; Cancer in her brother, brother, maternal grandmother, and mother; Colon cancer in an other family member; Dementia in her sister; Diabetes in her maternal aunt and son; Emphysema in her father; Hyperlipidemia in her mother; Hypertension in her father, mother, and son; No Known Problems in her daughter; Obesity in her paternal uncle; Prostate cancer in an other family member.    Pt  reports that she has never smoked. She has never used smokeless tobacco. She reports that she does not currently use alcohol. She reports that she does not use drugs.    Review of patient's allergies indicates:  No Known Allergies     Physical Exam    There were no vitals filed for this visit.  There is no height or weight on file to calculate BMI.    General: AOx3, NAD  Right Ear: External Auditory Canal WNL,TM w/o masses/lesions/perforations  Left Ear:  External Auditory Canal WNL,TM w/o masses/lesions/perforations  Nose: No gross nasal septal deviation.  Inferior Turbinates WNL bilaterally.  No septal perforation.  No masses/lesions.  Oral Cavity: FOM Soft, no masses palpated.  Oral Tongue mobile.  Hard Palate WNL.  Oropharynx: BOT WNL.  No masses/lesions noted.  Tonsillar fossa without lesions.  Soft palate without masses.  Midline uvula.  Neck: No palpable lymphadenopathy at I - VI.  Healed surgical scar noted to the neck, no swelling, tenderness. Normal ROM of the neck.   Face: House Brackmann I bilaterally.  Eyes: Normal extra ocular motion bilaterally.      Assessment     1. Thyroid cancer          Plan    Problem List Items Addressed This Visit          Oncology    Thyroid cancer - Primary (Chronic)     Has next follow up appt with Endocrinology in  April - plan for labs and ultrasound prior to that appt. Last ultrasound and labs reviewed, Thryglobulin improving, no residual thyroid tissue on ultrasound.   Follow up PRN.   Discussed with and examined by Dr Torres.

## 2024-03-22 ENCOUNTER — OFFICE VISIT (OUTPATIENT)
Dept: FAMILY MEDICINE | Facility: CLINIC | Age: 79
End: 2024-03-22
Attending: FAMILY MEDICINE
Payer: MEDICARE

## 2024-03-22 ENCOUNTER — TELEPHONE (OUTPATIENT)
Dept: FAMILY MEDICINE | Facility: CLINIC | Age: 79
End: 2024-03-22
Payer: MEDICARE

## 2024-03-22 VITALS
HEIGHT: 66 IN | OXYGEN SATURATION: 97 % | WEIGHT: 164.25 LBS | TEMPERATURE: 99 F | DIASTOLIC BLOOD PRESSURE: 70 MMHG | SYSTOLIC BLOOD PRESSURE: 118 MMHG | HEART RATE: 62 BPM | BODY MASS INDEX: 26.4 KG/M2

## 2024-03-22 DIAGNOSIS — I15.2 HYPERTENSION ASSOCIATED WITH DIABETES: Chronic | ICD-10-CM

## 2024-03-22 DIAGNOSIS — D50.9 IRON DEFICIENCY ANEMIA, UNSPECIFIED IRON DEFICIENCY ANEMIA TYPE: ICD-10-CM

## 2024-03-22 DIAGNOSIS — N18.31 STAGE 3A CHRONIC KIDNEY DISEASE: ICD-10-CM

## 2024-03-22 DIAGNOSIS — M54.50 CHRONIC MIDLINE LOW BACK PAIN WITHOUT SCIATICA: ICD-10-CM

## 2024-03-22 DIAGNOSIS — F41.1 GAD (GENERALIZED ANXIETY DISORDER): Chronic | ICD-10-CM

## 2024-03-22 DIAGNOSIS — E20.9 HYPOPARATHYROIDISM, UNSPECIFIED HYPOPARATHYROIDISM TYPE: ICD-10-CM

## 2024-03-22 DIAGNOSIS — M54.16 LUMBAR RADICULOPATHY, CHRONIC: ICD-10-CM

## 2024-03-22 DIAGNOSIS — G60.9 HEREDITARY AND IDIOPATHIC PERIPHERAL NEUROPATHY: ICD-10-CM

## 2024-03-22 DIAGNOSIS — E78.5 HYPERLIPIDEMIA ASSOCIATED WITH TYPE 2 DIABETES MELLITUS: Chronic | ICD-10-CM

## 2024-03-22 DIAGNOSIS — F32.9 REACTIVE DEPRESSION: Chronic | ICD-10-CM

## 2024-03-22 DIAGNOSIS — Z85.850 HISTORY OF THYROID CANCER: ICD-10-CM

## 2024-03-22 DIAGNOSIS — M81.0 AGE-RELATED OSTEOPOROSIS WITHOUT CURRENT PATHOLOGICAL FRACTURE: ICD-10-CM

## 2024-03-22 DIAGNOSIS — G89.29 CHRONIC MIDLINE LOW BACK PAIN WITHOUT SCIATICA: ICD-10-CM

## 2024-03-22 DIAGNOSIS — E11.59 HYPERTENSION ASSOCIATED WITH DIABETES: Chronic | ICD-10-CM

## 2024-03-22 DIAGNOSIS — M53.3 SI (SACROILIAC) JOINT DYSFUNCTION: ICD-10-CM

## 2024-03-22 DIAGNOSIS — G62.9 POLYNEUROPATHY: ICD-10-CM

## 2024-03-22 DIAGNOSIS — Z00.00 ENCOUNTER FOR PREVENTIVE HEALTH EXAMINATION: Primary | ICD-10-CM

## 2024-03-22 DIAGNOSIS — I73.9 PAD (PERIPHERAL ARTERY DISEASE): Chronic | ICD-10-CM

## 2024-03-22 DIAGNOSIS — E11.00 TYPE 2 DIABETES MELLITUS WITH HYPEROSMOLARITY WITHOUT COMA, WITHOUT LONG-TERM CURRENT USE OF INSULIN: Chronic | ICD-10-CM

## 2024-03-22 DIAGNOSIS — R74.01 ELEVATED ALT MEASUREMENT: ICD-10-CM

## 2024-03-22 DIAGNOSIS — E89.0 POSTOPERATIVE HYPOTHYROIDISM: Chronic | ICD-10-CM

## 2024-03-22 DIAGNOSIS — E55.9 VITAMIN D INSUFFICIENCY: ICD-10-CM

## 2024-03-22 DIAGNOSIS — I11.9 HYPERTENSIVE LEFT VENTRICULAR HYPERTROPHY, WITHOUT HEART FAILURE: ICD-10-CM

## 2024-03-22 DIAGNOSIS — E11.69 HYPERLIPIDEMIA ASSOCIATED WITH TYPE 2 DIABETES MELLITUS: Chronic | ICD-10-CM

## 2024-03-22 DIAGNOSIS — K21.9 GASTROESOPHAGEAL REFLUX DISEASE, UNSPECIFIED WHETHER ESOPHAGITIS PRESENT: ICD-10-CM

## 2024-03-22 PROCEDURE — 99999 PR PBB SHADOW E&M-EST. PATIENT-LVL IV: CPT | Mod: PBBFAC,,, | Performed by: FAMILY MEDICINE

## 2024-03-22 PROCEDURE — 99214 OFFICE O/P EST MOD 30 MIN: CPT | Mod: S$GLB,,, | Performed by: FAMILY MEDICINE

## 2024-03-22 NOTE — TELEPHONE ENCOUNTER
----- Message from oJlanta Barkley sent at 3/22/2024  7:18 AM CDT -----  Regarding: Needs return call  Type: Needs Medical Advice  Who Called:  Pt    Best Call Back Number: [phone]    Additional Information: She was wanting to speak to someone this mronign before her appointment please advise

## 2024-03-22 NOTE — PROGRESS NOTES
Subjective:       Patient ID: Erica Masterson is a 78 y.o. female.    Chief Complaint: Annual Exam    78-year-old female coming in for annual exam and follow-up of multiple medical problems.  She has a history of thyroid cancer with original thyroidectomy May of 2021 recurrence was found and she had a neck dissection and repeat thyroidectomy in September of 2021.  She has recently been released from surgery follow-up.  She has a history of degenerative disc disease in the cervical spine and has an upcoming appointment with Dr. Soria for a cervical injection.  She has sacroiliac dysfunction and chronic low back pain with lumbar radiculopathy and is doing very well following a low back injection per Dr. Soria.  She has hereditary idiopathic peripheral neuropathy, generalized anxiety disorder, depression, diabetes with nephropathy and without insulin use, hypertension, hyperlipidemia, peripheral arterial disease, LVH, stage 3 chronic kidney disease, hypocalcemia which generally is corrected with correction for low albumin levels, iron deficiency anemia, osteoporosis without fractures, hyperparathyroidism, vitamin-D deficiency, diverticulosis, reflux, colon polyps and elevated liver enzymes.  She just had her last colonoscopy in November of 2023 with Dr. 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    She is scheduled for extensive lab work by Malissa Juneau April 19 including her TSH, thyroglobulin, T4, renal function, A1c, vitamin-D level, lipid panel and microalbumin level.  She needs a foot exam and she has recently had a CBC and CMP which were satisfactory.  She has her eye exam scheduled.    Past Medical History:  3/26/2012: Adenomatous polyp of colon  No date: Allergy  No date: Anxiety  No date: Cancer  No date: Cataract  No date: Colon polyp  04/03/2012: Degenerative arthritis of knee  No date: Diverticulosis  No date: Encounter for blood transfusion  No date: GERD  (gastroesophageal reflux disease)  2021: History of thyroid cancer  No date: Hyperlipidemia  No date: Hypertension  5/20/2013: Lateral meniscal tear  03/26/2012: Multinodular goiter  01/18/2010: Myopathy, unspecified  No date: Tuberculosis    Past Surgical History:  2015: BREAST BIOPSY; Left      Comment:  benign  No date: CHOLECYSTECTOMY  12/02/2015: COLONOSCOPY      Comment:  Dr. Britton, multiple polyps, recheck five years-three                years if more than 2 polyps are adenomatous  12/02/2015: COLONOSCOPY; N/A  03/20/2019: COLONOSCOPY; N/A      Comment:  Procedure: COLONOSCOPY;  Surgeon: Jose Britton MD;                Location: [facility];  Service: Endoscopy;  Laterality:                N/A;  05/20/2020: COLONOSCOPY; N/A      Comment:  Dr. Britton; internal hemorrhoids; diverticulosis;                polyps removed; repeat in 3 years  11/16/2023: COLONOSCOPY; N/A      Comment:  Procedure: COLONOSCOPY(instructions mailed 11/9);                 Surgeon: Jose Britton MD;  Location: [facility];                 Service: Endoscopy;  Laterality: N/A;  08/26/2020: CYSTOSCOPY; N/A      Comment:  Procedure: CYSTOSCOPY;  Surgeon: Charlotte Walters Jr., MD;               Location: [facility] OR;  Service: Urology;  Laterality: N/A;  No date: DILATION AND CURETTAGE OF UTERUS  09/13/2021: DISSECTION OF NECK; Left      Comment:  Procedure: DISSECTION, NECK;  Surgeon: Ryan Torres MD;  Location: [address];  Service: ENT;                 Laterality: Left;  7/20/2023: EPIDURAL STEROID INJECTION INTO LUMBAR SPINE; N/A      Comment:  Procedure: Injection-steroid-epidural-lumbar;  Surgeon:                Julián Chiang MD;  Location: [facility];  Service: Pain                Management;  Laterality: N/A;  L5-s1  08/22/2022: ESOPHAGEAL MANOMETRY WITH MEASUREMENT OF IMPEDANCE; N/A      Comment:  Procedure: MANOMETRY, ESOPHAGUS, WITH IMPEDANCE                MEASUREMENT;  Surgeon: Pantera Mcguire MD;  Location:  [facility]                ENDO (4TH FLR);  Service: Endoscopy;  Laterality: N/A;                 8/4 fully vaccinated; instructions mailed-st  05/20/2020: ESOPHAGOGASTRODUODENOSCOPY; N/A      Comment:  Dr. Britton; small hiatal hernia; gastritis; 8 gastric                polyps removed  04/01/2022: ESOPHAGOGASTRODUODENOSCOPY; N/A      Comment:  Procedure: EGD (ESOPHAGOGASTRODUODENOSCOPY);  Surgeon:                Jose Britton MD;  Location: [facility];  Service:                Endoscopy;  Laterality: N/A;  No date: FOOT SURGERY  No date: HYSTERECTOMY      Comment:  TVH/BSO > 20 years  8/4/2023: INCISION OF VULVA; Bilateral      Comment:  Procedure: EXCISION, CYST, LABIA AND OTHER INDICATED                PROCEDURES;  Surgeon: Mat Beach MD;  Location:                [facility] OR;  Service: OB/GYN;  Laterality: Bilateral;                 EXCYSION OF VULVAR CYST  12/1/2023: INJECTION, SACROILIAC JOINT; Right      Comment:  Procedure: INJECTION,SACROILIAC JOINT;  Surgeon: Julián Chiang MD;  Location: [facility] OR;  Service:                Anesthesiology;  Laterality: Right;  sacroiliac injection  06/04/2020: INTRALUMINAL GASTROINTESTINAL TRACT IMAGING VIA CAPSULE;   N/A      Comment:  Procedure: IMAGING PROCEDURE, GI TRACT, INTRALUMINAL,                VIA CAPSULE;  Surgeon: Jose Britton MD;  Location:                [facility];  Service: Endoscopy;  Laterality: N/A;  06/06/2013: KNEE SURGERY      Comment:  right knee tear Dr Iverson   09/17/2020: LAPAROSCOPIC CHOLECYSTECTOMY; N/A      Comment:  Procedure: CHOLECYSTECTOMY, LAPAROSCOPIC;  Surgeon:                Forrest Veronica MD;  Location: [facility] OR;  Service:                General;  Laterality: N/A;  1966: LUNG LOBECTOMY      Comment:  right middle lobectomy, due to Tb  No date: OOPHORECTOMY  No date: SHOULDER SURGERY      Comment:  francis   05/19/2021: THYROIDECTOMY; Bilateral      Comment:  Procedure: THYROIDECTOMY;  Surgeon: Jamia Amezquita                MD;  [facility];  Service: General;                 Laterality: Bilateral;  09/13/2021: THYROIDECTOMY; Bilateral      Comment:  Procedure: THYROIDECTOMY WITH INTRA-OP PTH;  Surgeon:                Ryan Torres MD;  Location: [facility] OR [address] [place] [address];  Service:               ENT;  Laterality: Bilateral;  NIM tube  Intraop PTH    Review of patient's family history indicates:  Problem: Colon cancer      Relation: Other          Age of Onset: (Not Specified)  Problem: Cancer      Relation: Mother          Age of Onset: (Not Specified)  Problem: Hypertension      Relation: Mother          Age of Onset: (Not Specified)  Problem: Hyperlipidemia      Relation: Mother          Age of Onset: (Not Specified)  Problem: Cancer      Relation: Brother          Age of Onset: (Not Specified)  Problem: Hypertension      Relation: Father          Age of Onset: (Not Specified)  Problem: Emphysema      Relation: Father          Age of Onset: (Not Specified)  Problem: Diabetes      Relation: Son          Age of Onset: (Not Specified)  Problem: Hypertension      Relation: Son          Age of Onset: (Not Specified)  Problem: Alcohol abuse      Relation: Son          Age of Onset: (Not Specified)  Problem: Diabetes      Relation: Maternal Aunt          Age of Onset: (Not Specified)  Problem: Alzheimer's disease      Relation: Maternal Uncle          Age of Onset: (Not Specified)  Problem: Cancer      Relation: Maternal Grandmother          Age of Onset: (Not Specified)  Problem: No Known Problems      Relation: Daughter          Age of Onset: (Not Specified)  Problem: Dementia      Relation: Sister          Age of Onset: (Not Specified)  Problem: Alzheimer's disease      Relation: Sister          Age of Onset: (Not Specified)  Problem: Breast cancer      Relation: Sister          Age of Onset: 60  Problem: Obesity      Relation: Paternal Uncle          Age of Onset: (Not Specified)  Problem: Prostate cancer       Relation: Other          Age of Onset: (Not Specified)  Problem: Cancer      Relation: Brother          Age of Onset: (Not Specified)  Problem: Melanoma      Relation: Neg Hx          Age of Onset: (Not Specified)  Problem: Psoriasis      Relation: Neg Hx          Age of Onset: (Not Specified)  Problem: Lupus      Relation: Neg Hx          Age of Onset: (Not Specified)  Problem: Eczema      Relation: Neg Hx          Age of Onset: (Not Specified)  Problem: Amblyopia      Relation: Neg Hx          Age of Onset: (Not Specified)  Problem: Blindness      Relation: Neg Hx          Age of Onset: (Not Specified)  Problem: Cataracts      Relation: Neg Hx          Age of Onset: (Not Specified)  Problem: Glaucoma      Relation: Neg Hx          Age of Onset: (Not Specified)  Problem: Macular degeneration      Relation: Neg Hx          Age of Onset: (Not Specified)  Problem: Retinal detachment      Relation: Neg Hx          Age of Onset: (Not Specified)  Problem: Strabismus      Relation: Neg Hx          Age of Onset: (Not Specified)  Problem: Stroke      Relation: Neg Hx          Age of Onset: (Not Specified)  Problem: Thyroid cancer      Relation: Neg Hx          Age of Onset: (Not Specified)  Problem: Colon polyps      Relation: Neg Hx          Age of Onset: (Not Specified)  Problem: Ulcerative colitis      Relation: Neg Hx          Age of Onset: (Not Specified)  Problem: Stomach cancer      Relation: Neg Hx          Age of Onset: (Not Specified)  Problem: Rectal cancer      Relation: Neg Hx          Age of Onset: (Not Specified)    Social History    Tobacco Use      Smoking status: Never      Smokeless tobacco: Never    Alcohol use: Not Currently      Comment: stopped 2019    Drug use: No    .med      Review of Systems   Constitutional:  Negative for chills, diaphoresis, fatigue, fever and unexpected weight change.   HENT:  Negative for congestion, ear pain, hearing loss, postnasal drip and sinus pressure.    Eyes:  Negative  for itching and visual disturbance.   Respiratory:  Negative for cough, chest tightness, shortness of breath and wheezing.    Cardiovascular:  Negative for chest pain, palpitations and leg swelling.   Gastrointestinal:  Negative for abdominal pain, blood in stool, constipation, diarrhea, nausea and vomiting.   Genitourinary:  Negative for dysuria, frequency and hematuria.   Musculoskeletal:  Positive for neck pain. Negative for arthralgias, back pain, joint swelling and myalgias.   Neurological:  Negative for dizziness and headaches.   Hematological:  Negative for adenopathy.   Psychiatric/Behavioral:  Negative for sleep disturbance. The patient is not nervous/anxious.        Objective:      Physical Exam  Vitals and nursing note reviewed.   Constitutional:       General: She is not in acute distress.     Appearance: Normal appearance. She is well-developed and normal weight. She is not ill-appearing, toxic-appearing or diaphoretic.      Comments: Good blood pressure control  Normal pulse with regular rhythm   Normal weight for age with a BMI of 26.5 she is up 6.3 lb from her January 25, 2023 visit   HENT:      Head: Normocephalic and atraumatic.      Right Ear: Tympanic membrane, ear canal and external ear normal. There is no impacted cerumen.      Left Ear: Tympanic membrane, ear canal and external ear normal. There is no impacted cerumen.      Nose: Nose normal. No congestion or rhinorrhea.      Mouth/Throat:      Mouth: Mucous membranes are moist.      Pharynx: Oropharynx is clear. No oropharyngeal exudate or posterior oropharyngeal erythema.   Eyes:      General: No scleral icterus.        Right eye: No discharge.         Left eye: No discharge.      Extraocular Movements: Extraocular movements intact.      Conjunctiva/sclera: Conjunctivae normal.      Pupils: Pupils are equal, round, and reactive to light.   Neck:      Thyroid: No thyromegaly.      Vascular: No carotid bruit or JVD.   Cardiovascular:      Rate  and Rhythm: Normal rate and regular rhythm.      Pulses: Normal pulses.           Dorsalis pedis pulses are 2+ on the right side and 2+ on the left side.        Posterior tibial pulses are 2+ on the right side and 2+ on the left side.      Heart sounds: Normal heart sounds. No murmur heard.     No friction rub. No gallop.   Pulmonary:      Effort: Pulmonary effort is normal. No respiratory distress.      Breath sounds: Normal breath sounds. No stridor. No wheezing, rhonchi or rales.   Chest:      Chest wall: No tenderness.   Abdominal:      General: Bowel sounds are normal. There is no distension.      Palpations: Abdomen is soft. There is no mass.      Tenderness: There is no abdominal tenderness. There is no guarding or rebound.      Hernia: No hernia is present.   Musculoskeletal:         General: No swelling, tenderness or signs of injury. Normal range of motion.      Cervical back: Normal range of motion and neck supple. No rigidity or tenderness.      Right lower leg: No edema.      Left lower leg: No edema.      Right foot: Normal range of motion. Deformity (Slight override of 2nd toe with previously corrected bunion) present. No bunion, Charcot foot, foot drop or prominent metatarsal heads.      Left foot: Normal range of motion. Deformity (Slight bunion deformity with slight override of 2nd toe, no ulcers) present. No bunion, Charcot foot, foot drop or prominent metatarsal heads.   Feet:      Right foot:      Protective Sensation: 9 sites tested.  9 sites sensed.      Skin integrity: Skin integrity normal. No ulcer, blister, skin breakdown, erythema, warmth, callus, dry skin or fissure.      Toenail Condition: Right toenails are normal.      Left foot:      Protective Sensation: 9 sites tested.  9 sites sensed.      Skin integrity: Skin integrity normal. No ulcer, blister, skin breakdown, erythema, warmth, callus, dry skin or fissure.      Toenail Condition: Left toenails are normal.   Lymphadenopathy:       Cervical: No cervical adenopathy.   Skin:     General: Skin is warm and dry.      Capillary Refill: Capillary refill takes less than 2 seconds.      Coloration: Skin is not jaundiced or pale.      Findings: No bruising, erythema, lesion or rash.   Neurological:      General: No focal deficit present.      Mental Status: She is alert and oriented to person, place, and time. Mental status is at baseline.      Cranial Nerves: No cranial nerve deficit.      Sensory: No sensory deficit.      Motor: No weakness.      Coordination: Coordination normal.      Gait: Gait normal.      Deep Tendon Reflexes: Reflexes are normal and symmetric. Reflexes normal.      Comments: Proprioception intact all 10 toes   Psychiatric:         Mood and Affect: Mood normal.         Behavior: Behavior normal.         Thought Content: Thought content normal.         Judgment: Judgment normal.         Assessment:       1. Encounter for preventive health examination    2. Hypertension associated with diabetes    3. Hyperlipidemia associated with type 2 diabetes mellitus, baseline     4. Stage 3a chronic kidney disease    5. PAD (peripheral artery disease)    6. Hypertensive left ventricular hypertrophy, without heart failure    7. Polyneuropathy    8. Type 2 diabetes mellitus with hyperosmolarity without coma, without long-term current use of insulin    9. Postoperative hypothyroidism    10. Lumbar radiculopathy, chronic    11. Hereditary and idiopathic peripheral neuropathy    12. RONNIE (generalized anxiety disorder), 2019    13. Reactive depression    14. History of thyroid cancer    15. Iron deficiency anemia, unspecified iron deficiency anemia type    16. Vitamin D insufficiency    17. Hypoparathyroidism, unspecified hypoparathyroidism type    18. Age-related osteoporosis without current pathological fracture    19. Gastroesophageal reflux disease, unspecified whether esophagitis present    20. Elevated ALT measurement    21. SI  (sacroiliac) joint dysfunction    22. Chronic midline low back pain without sciatica    23. BMI 26.0-26.9,adult        Plan:       1. Encounter for preventive health examination    2. Hypertension associated with diabetes  Well controlled with no changes needed in amlodipine 2.5 daily and losartan 100 mg daily    3. Hyperlipidemia associated with type 2 diabetes mellitus, baseline   Lab Results   Component Value Date    CHOL 154 08/08/2022    CHOL 177 04/06/2022    CHOL 163 10/30/2020     Lab Results   Component Value Date    HDL 59 08/08/2022    HDL 70 04/06/2022    HDL 63 10/30/2020     Lab Results   Component Value Date    LDLCALC 86.4 08/08/2022    LDLCALC 97.4 04/06/2022    LDLCALC 91.6 10/30/2020     Lab Results   Component Value Date    TRIG 43 08/08/2022    TRIG 48 04/06/2022    TRIG 42 10/30/2020       Lab Results   Component Value Date    CHOLHDL 38.3 08/08/2022    CHOLHDL 39.5 04/06/2022    CHOLHDL 38.7 10/30/2020     Well controlled with follow-up lab scheduled April 19    4. Stage 3a chronic kidney disease  BMP  Lab Results   Component Value Date     01/30/2024    K 4.5 01/30/2024     01/30/2024    CO2 27 01/30/2024    BUN 18 01/30/2024    CREATININE 1.2 01/30/2024    CALCIUM 8.3 (L) 01/30/2024    ANIONGAP 9 01/30/2024    EGFRNORACEVR 46 (A) 01/30/2024     Stable with recheck scheduled April 19    5. PAD (peripheral artery disease)  Asymptomatic    6. Hypertensive left ventricular hypertrophy, without heart failure  Stable and asymptomatic    7. Polyneuropathy  Stable with painful neuropathy controlled on gabapentin 200 mg at bedtime, no refill needed at this time    8. Type 2 diabetes mellitus with hyperosmolarity without coma, without long-term current use of insulin  Lab Results   Component Value Date    HGBA1C 5.8 (H) 10/25/2023     Well controlled with follow-up scheduled April 19    9. Postoperative hypothyroidism  Lab Results   Component Value Date    TSH 0.322 (L) 10/25/2023      Stable with the appropriate suppression of TSH, recheck scheduled April 19    10. Lumbar radiculopathy, chronic  Much improved post SI joint injection    11. Hereditary and idiopathic peripheral neuropathy  Stable    12. RONNIE (generalized anxiety disorder), 2019  Stable    13. Reactive depression  Stable    14. History of thyroid cancer  Controlled followed by Endocrinology    15. Iron deficiency anemia, unspecified iron deficiency anemia type  Lab Results   Component Value Date    WBC 5.00 01/25/2024    HGB 12.3 01/25/2024    HCT 39.0 01/25/2024    MCV 93 01/25/2024     01/25/2024       Resolved    16. Vitamin D insufficiency  Follow-up scheduled April 19    17. Hypoparathyroidism, unspecified hypoparathyroidism type  Lab Results   Component Value Date    PTH 41.0 05/22/2023    CALCIUM 8.3 (L) 01/30/2024    CAION 1.10 05/22/2023    PHOS 4.6 (H) 08/09/2022     Stable, calcium level corrected for albumin is in the normal range    18. Age-related osteoporosis without current pathological fracture  Asymptomatic    19. Gastroesophageal reflux disease, unspecified whether esophagitis present  Asymptomatic    20. Elevated ALT measurement  Lab Results   Component Value Date    ALT 15 01/30/2024    AST 18 01/30/2024    ALKPHOS 38 (L) 01/30/2024    BILITOT 0.5 01/30/2024     Resolved and monitored    21. SI (sacroiliac) joint dysfunction  Controlled by SI injection    22. Chronic midline low back pain without sciatica  See above    23. BMI 26.0-26.9,adult  Healthy weight for age no changes needed

## 2024-03-25 ENCOUNTER — CLINICAL SUPPORT (OUTPATIENT)
Dept: REHABILITATION | Facility: HOSPITAL | Age: 79
End: 2024-03-25
Payer: MEDICARE

## 2024-03-25 DIAGNOSIS — M25.60 RANGE OF MOTION DEFICIT: Primary | ICD-10-CM

## 2024-03-25 PROCEDURE — 97530 THERAPEUTIC ACTIVITIES: CPT | Mod: PN

## 2024-03-25 PROCEDURE — 97112 NEUROMUSCULAR REEDUCATION: CPT | Mod: PN

## 2024-03-25 NOTE — PROGRESS NOTES
"[facility]   Physical Therapy Treatment Note     Name: Erica Masterson  Clinic Number: [id number]    Therapy Diagnosis:   Encounter Diagnosis   Name Primary?    Range of motion deficit Yes       Physician: Jacinto Palm,*    Visit Date: 3/25/2024    Physician Orders: PT Eval and treat   Medical Diagnosis from Referral:   Diagnosis   M79.671,M79.672 (ICD-10-CM) - Pain in both feet      Evaluation Date: 3/12/2024  Authorization Period Expiration: 12/31/2024   Plan of Care Expiration: 4/9/2024  Progress Note Due: 4/12/2024  Visit # / Visits authorized: 2/ 10   FOTO: 1/ 3    FOTO 1st: 77%  Predicted Score: 77%  FOTO 3rd:  FOTO 10th:    Time in: 400pm  Time out: 450pm  Total Billable Time: 30 minutes    Precautions:  Standard , HTN       SUBJECTIVE     Pt reports: feeling a little better but she is taking alieve.     She was compliant with home exercise program.  Response to previous treatment: ongoing  Functional change: ongoing    Pain: 3/10  Location: Bilateral feet     OBJECTIVE     Objective Measures updated at progress report unless specified.     Treatment       Erica received the treatments listed below:      Therapeutic exercises to develop strength, endurance, ROM, flexibility, and posture for 10 minutes including:    DF self mobilization 20" x 3 Bilateral   Knee straight calf stretch 20" x 3 Bilateral     Neuromuscular re-education activities to improve: Balance, Coordination, and Proprioception for 10 minutes. The following activities were included:    Slump slider x 30 Bilateral   Bridges 2 x 15 with 5" hold  Clamshell 3 x 15, supine, green band     Therapeutic activities to improve functional performance for 30 minutes, including:    Recumbent bike 10' level 3 for activity tolerance  Mini squats 3 x 10  Standing double leg heel raises 2 x 20  Single leg balance 30" x 3 Bilateral       Patient Education and Home Exercises     Home Exercises Provided and Patient Education " Provided     Education provided:   - Home Exercise Program     Written Home Exercises Provided: Patient instructed to cont prior HEP. Exercises were reviewed and Erica was able to demonstrate them prior to the end of the session.  Erica demonstrated good  understanding of the education provided. See EMR under Patient Instructions for exercises provided during therapy sessions    ASSESSMENT     Erica presented with no symptoms during session. She reports feeling a little better. We focused on Lower Extremity flexibility and strength today. She did well with no increase in pain.    Erica is progressing well towards her goals.     Pt prognosis is Fair.     Pt will continue to benefit from skilled outpatient physical therapy to address the deficits listed in the problem list box on initial evaluation, provide pt/family education and to maximize pt's level of independence in the home and community environment.     Pt's spiritual, cultural and educational needs considered and pt agreeable to plan of care and goals.     Anticipated barriers to physical therapy: age, co-morbidities     Goals:   Short Term Goals: 2 weeks -Progressing, not met   Patient will be independent in Home Exercise Program to support improved Lower Extremity mobility and symptoms.     Long Term Goals: 4 weeks -Progressing, not met   Patient will be independent in progressed Home Exercise Program.  Patient goal: hopes to stretch out more.  Patient will have improved FOTO to predicted level to show true functional improvements.  Patient will have decreased frequency of cramping in the morning and less burning in her feet to increase tolerance of weight bearing early in the morning.     PLAN   Plan of care Certification: 3/12/2024 to 4/9/2024.     Progress as tolerated with mobility and functional strengthening.    Lauren Knox, PT , DPT

## 2024-03-27 ENCOUNTER — HOSPITAL ENCOUNTER (OUTPATIENT)
Facility: HOSPITAL | Age: 79
Discharge: HOME | End: 2024-03-27
Attending: ANESTHESIOLOGY | Admitting: ANESTHESIOLOGY
Payer: MEDICARE

## 2024-03-27 DIAGNOSIS — M54.12 CERVICAL RADICULITIS: ICD-10-CM

## 2024-03-27 PROCEDURE — A4216 STERILE WATER/SALINE, 10 ML: HCPCS | Performed by: ANESTHESIOLOGY

## 2024-03-27 PROCEDURE — 62321 NJX INTERLAMINAR CRV/THRC: CPT | Performed by: ANESTHESIOLOGY

## 2024-03-27 PROCEDURE — 63600175 PHARM REV CODE 636 W HCPCS: Performed by: ANESTHESIOLOGY

## 2024-03-27 PROCEDURE — 25000003 PHARM REV CODE 250: Performed by: ANESTHESIOLOGY

## 2024-03-27 PROCEDURE — 25500020 PHARM REV CODE 255: Performed by: ANESTHESIOLOGY

## 2024-03-27 PROCEDURE — 62321 NJX INTERLAMINAR CRV/THRC: CPT | Mod: ,,, | Performed by: ANESTHESIOLOGY

## 2024-03-27 RX ORDER — FENTANYL CITRATE 50 UG/ML
INJECTION, SOLUTION INTRAMUSCULAR; INTRAVENOUS
Status: DISCONTINUED | OUTPATIENT
Start: 2024-03-27 | End: 2024-03-27 | Stop reason: HOSPADM

## 2024-03-27 RX ORDER — MIDAZOLAM HYDROCHLORIDE 1 MG/ML
INJECTION INTRAMUSCULAR; INTRAVENOUS
Status: DISCONTINUED | OUTPATIENT
Start: 2024-03-27 | End: 2024-03-27 | Stop reason: HOSPADM

## 2024-03-27 RX ORDER — LIDOCAINE HYDROCHLORIDE 10 MG/ML
1 INJECTION, SOLUTION EPIDURAL; INFILTRATION; INTRACAUDAL; PERINEURAL ONCE
Status: ACTIVE | OUTPATIENT
Start: 2024-03-27

## 2024-03-27 RX ORDER — LIDOCAINE HYDROCHLORIDE 10 MG/ML
INJECTION, SOLUTION EPIDURAL; INFILTRATION; INTRACAUDAL; PERINEURAL
Status: DISCONTINUED | OUTPATIENT
Start: 2024-03-27 | End: 2024-03-27 | Stop reason: HOSPADM

## 2024-03-27 RX ORDER — SODIUM CHLORIDE 9 MG/ML
INJECTION, SOLUTION INTRAMUSCULAR; INTRAVENOUS; SUBCUTANEOUS
Status: DISCONTINUED | OUTPATIENT
Start: 2024-03-27 | End: 2024-03-27 | Stop reason: HOSPADM

## 2024-03-27 RX ORDER — SODIUM CHLORIDE, SODIUM LACTATE, POTASSIUM CHLORIDE, CALCIUM CHLORIDE 600; 310; 30; 20 MG/100ML; MG/100ML; MG/100ML; MG/100ML
INJECTION, SOLUTION INTRAVENOUS CONTINUOUS
Status: ACTIVE | OUTPATIENT
Start: 2024-03-27

## 2024-03-27 RX ORDER — DEXAMETHASONE SODIUM PHOSPHATE 10 MG/ML
INJECTION INTRAMUSCULAR; INTRAVENOUS
Status: DISCONTINUED | OUTPATIENT
Start: 2024-03-27 | End: 2024-03-27 | Stop reason: HOSPADM

## 2024-03-27 RX ADMIN — SODIUM CHLORIDE, POTASSIUM CHLORIDE, SODIUM LACTATE AND CALCIUM CHLORIDE: 600; 310; 30; 20 INJECTION, SOLUTION INTRAVENOUS at 12:03

## 2024-03-27 NOTE — DISCHARGE SUMMARY
[facility] - Periop Services  Discharge Note  Short Stay    Procedure(s) (LRB):  Injection-steroid-epidural-cervical (N/A)      OUTCOME: Patient tolerated treatment/procedure well without complication and is now ready for discharge.    DISPOSITION: Home or Self Care    FINAL DIAGNOSIS:  <principal problem not specified>    FOLLOWUP: In clinic    DISCHARGE INSTRUCTIONS:    Discharge Procedure Orders   Notify your health care provider if you experience any of the following:  temperature >100.4     Notify your health care provider if you experience any of the following:  severe uncontrolled pain     Notify your health care provider if you experience any of the following:  redness, tenderness, or signs of infection (pain, swelling, redness, odor or green/yellow discharge around incision site)     Activity as tolerated        TIME SPENT ON DISCHARGE: 30 minutes

## 2024-03-27 NOTE — PLAN OF CARE
Tolerated procedure well. Denies pain at this time. Transferred out of facility via wheelchair to spouse () without incident. Discharge instructions in hand upon departure.

## 2024-03-27 NOTE — OP NOTE
PROCEDURE DATE: 3/27/2024    Procedure: C7-T1 cervical interlaminar epidural steroid injection under utilizing fluoroscopy.    Diagnosis: Cervical Degenerative Disc Diease; Cervical Radiculitis  POSTOP DIAGNOSIS: SAME    Physician: Julián Chiang MD    Medications injected:  Dexamethasone 10mg followed by a slow injection of 4mL sterile, preservative-free normal saline.    Local anesthetic used: Lidocaine 1%, 2 ml.    Sedation Medications: RN IV sedation    Complications:  None     Estimated blood loss: None    Technique:  A time-out was taken to identify patient and procedure prior to starting the procedure.  With the patient laying in a prone position with the neck in a mid-flexed forward position, the area was prepped and draped in the usual sterile fashion using ChloraPrep and a fenestrated drape.  The area was determined under AP fluoroscopic guidance.  Local anesthetic was given using a 25-gauge 1.5 inch needle by raising a wheal and then infiltrating ventrally.  A 3.5 inch 20-gauge Touhy needle was introduced under fluoroscopic guidance to meet the lamina of C7.  The needle was then hinged under the lamina then advanced using loss of resistance technique.  Once the tip of the needle was in the desired position, the 1ml contrast dye  was injected to determine placement and no uptake.  The steroid was then injected slowly followed by a slow injection of 4 mL of the sterile preservative-free normal saline.  The patient tolerated the procedure well.    The patient was monitored after the procedure and was given post-procedure and discharge instructions to follow at home. The patient was discharged in a stable condition.

## 2024-03-28 VITALS
SYSTOLIC BLOOD PRESSURE: 108 MMHG | DIASTOLIC BLOOD PRESSURE: 63 MMHG | HEART RATE: 59 BPM | BODY MASS INDEX: 26.36 KG/M2 | RESPIRATION RATE: 18 BRPM | TEMPERATURE: 98 F | HEIGHT: 66 IN | OXYGEN SATURATION: 97 % | WEIGHT: 164 LBS

## 2024-04-03 ENCOUNTER — CLINICAL SUPPORT (OUTPATIENT)
Dept: REHABILITATION | Facility: HOSPITAL | Age: 79
End: 2024-04-03
Payer: MEDICARE

## 2024-04-03 DIAGNOSIS — M25.60 RANGE OF MOTION DEFICIT: Primary | ICD-10-CM

## 2024-04-03 PROCEDURE — 97530 THERAPEUTIC ACTIVITIES: CPT | Mod: PN

## 2024-04-03 PROCEDURE — 97112 NEUROMUSCULAR REEDUCATION: CPT | Mod: PN

## 2024-04-03 NOTE — PROGRESS NOTES
"[facility]   Physical Therapy Treatment Note     Name: Erica Masterson  Clinic Number: [id number]    Therapy Diagnosis:   Encounter Diagnosis   Name Primary?    Range of motion deficit Yes       Physician: Jacinto Palm,*    Visit Date: 4/3/2024    Physician Orders: PT Eval and treat   Medical Diagnosis from Referral:   Diagnosis   M79.671,M79.672 (ICD-10-CM) - Pain in both feet      Evaluation Date: 3/12/2024  Authorization Period Expiration: 12/31/2024   Plan of Care Expiration: 4/9/2024  Progress Note Due: 4/12/2024  Visit # / Visits authorized: 3/ 10   FOTO: 1/ 3    FOTO 1st: 77%  Predicted Score: 77%  FOTO 3rd:  FOTO 10th:    Time in: 930am  Time out: 1030am  Total Billable Time: 45 minutes    Precautions:  Standard , HTN       SUBJECTIVE     Pt reports: feeling alright, got her injection and thinks its helping. Having less cramping.     She was compliant with home exercise program.  Response to previous treatment: ongoing  Functional change: ongoing    Pain: 3/10  Location: Bilateral feet     OBJECTIVE     Objective Measures updated at progress report unless specified.     Treatment       Erica received the treatments listed below:      Therapeutic exercises to develop strength, endurance, ROM, flexibility, and posture for 10 minutes including:    DF self mobilization 20" x 3 Bilateral   Knee straight calf stretch 20" x 3 Bilateral     Neuromuscular re-education activities to improve: Balance, Coordination, and Proprioception for 20 minutes. The following activities were included:    Slump slider x 30 Bilateral   Bridges 2 x 15 with 5" hold  Clamshell 3 x 15, supine, green band   Straight leg raise x 30 Bilateral     Therapeutic activities to improve functional performance for 30 minutes, including:    Recumbent bike 15' level 3 for activity tolerance  Mini squats 3 x 10  Standing double leg heel raises 2 x 20  Single leg balance 30" x 3 Bilateral       Patient Education and " Home Exercises     Home Exercises Provided and Patient Education Provided     Education provided:   - Home Exercise Program     Written Home Exercises Provided: Patient instructed to cont prior HEP. Exercises were reviewed and Erica was able to demonstrate them prior to the end of the session.  Erica demonstrated good  understanding of the education provided. See EMR under Patient Instructions for exercises provided during therapy sessions    ASSESSMENT     Erica presented with improved Lower Extremity cramping and overall is feeling better. I printed up all of her exercises and we reviewed them today. She will continue independently and is discharged at this time.    Erica is progressing well towards her goals.     Pt prognosis is Fair.     Pt will continue to benefit from skilled outpatient physical therapy to address the deficits listed in the problem list box on initial evaluation, provide pt/family education and to maximize pt's level of independence in the home and community environment.     Pt's spiritual, cultural and educational needs considered and pt agreeable to plan of care and goals.     Anticipated barriers to physical therapy: age, co-morbidities     Goals:   Short Term Goals: 2 weeks -Met  Patient will be independent in Home Exercise Program to support improved Lower Extremity mobility and symptoms.     Long Term Goals: 4 weeks -Met  Patient will be independent in progressed Home Exercise Program.  Patient goal: hopes to stretch out more.  Patient will have improved FOTO to predicted level to show true functional improvements.  Patient will have decreased frequency of cramping in the morning and less burning in her feet to increase tolerance of weight bearing early in the morning.     PLAN   Plan of care Certification: 3/12/2024 to 4/9/2024.     Discharged.    Lauren Knox, PT , DPT

## 2024-04-08 ENCOUNTER — TELEPHONE (OUTPATIENT)
Dept: ADMINISTRATIVE | Facility: CLINIC | Age: 79
End: 2024-04-08
Payer: MEDICARE

## 2024-04-09 ENCOUNTER — OFFICE VISIT (OUTPATIENT)
Dept: FAMILY MEDICINE | Facility: CLINIC | Age: 79
End: 2024-04-09
Payer: MEDICARE

## 2024-04-09 ENCOUNTER — OFFICE VISIT (OUTPATIENT)
Dept: PAIN MEDICINE | Facility: CLINIC | Age: 79
End: 2024-04-09
Payer: MEDICARE

## 2024-04-09 VITALS
TEMPERATURE: 98 F | BODY MASS INDEX: 26.54 KG/M2 | HEIGHT: 66 IN | DIASTOLIC BLOOD PRESSURE: 67 MMHG | SYSTOLIC BLOOD PRESSURE: 126 MMHG | OXYGEN SATURATION: 96 % | WEIGHT: 165.13 LBS | HEART RATE: 66 BPM

## 2024-04-09 VITALS
DIASTOLIC BLOOD PRESSURE: 73 MMHG | SYSTOLIC BLOOD PRESSURE: 126 MMHG | HEIGHT: 66 IN | WEIGHT: 165.13 LBS | BODY MASS INDEX: 26.54 KG/M2 | HEART RATE: 63 BPM

## 2024-04-09 DIAGNOSIS — G62.9 NEUROPATHY: Primary | ICD-10-CM

## 2024-04-09 DIAGNOSIS — M50.30 DDD (DEGENERATIVE DISC DISEASE), CERVICAL: ICD-10-CM

## 2024-04-09 DIAGNOSIS — M51.36 DDD (DEGENERATIVE DISC DISEASE), LUMBAR: ICD-10-CM

## 2024-04-09 DIAGNOSIS — Z00.00 ENCOUNTER FOR PREVENTIVE HEALTH EXAMINATION: Primary | ICD-10-CM

## 2024-04-09 DIAGNOSIS — I70.0 ATHEROSCLEROSIS OF AORTA: ICD-10-CM

## 2024-04-09 DIAGNOSIS — I15.2 HYPERTENSION ASSOCIATED WITH DIABETES: Chronic | ICD-10-CM

## 2024-04-09 DIAGNOSIS — E11.59 HYPERTENSION ASSOCIATED WITH DIABETES: Chronic | ICD-10-CM

## 2024-04-09 DIAGNOSIS — E11.9 CONTROLLED TYPE 2 DIABETES MELLITUS WITHOUT COMPLICATION, WITHOUT LONG-TERM CURRENT USE OF INSULIN: ICD-10-CM

## 2024-04-09 DIAGNOSIS — E78.5 HYPERLIPIDEMIA ASSOCIATED WITH TYPE 2 DIABETES MELLITUS: Chronic | ICD-10-CM

## 2024-04-09 DIAGNOSIS — E11.69 HYPERLIPIDEMIA ASSOCIATED WITH TYPE 2 DIABETES MELLITUS: Chronic | ICD-10-CM

## 2024-04-09 PROCEDURE — 1159F MED LIST DOCD IN RCRD: CPT | Mod: CPTII,S$GLB,, | Performed by: NURSE PRACTITIONER

## 2024-04-09 PROCEDURE — 1101F PT FALLS ASSESS-DOCD LE1/YR: CPT | Mod: CPTII,S$GLB,, | Performed by: NURSE PRACTITIONER

## 2024-04-09 PROCEDURE — 3074F SYST BP LT 130 MM HG: CPT | Mod: CPTII,S$GLB,,

## 2024-04-09 PROCEDURE — 3078F DIAST BP <80 MM HG: CPT | Mod: CPTII,S$GLB,,

## 2024-04-09 PROCEDURE — 3288F FALL RISK ASSESSMENT DOCD: CPT | Mod: CPTII,S$GLB,, | Performed by: NURSE PRACTITIONER

## 2024-04-09 PROCEDURE — 1158F ADVNC CARE PLAN TLK DOCD: CPT | Mod: CPTII,S$GLB,,

## 2024-04-09 PROCEDURE — 3072F LOW RISK FOR RETINOPATHY: CPT | Mod: CPTII,S$GLB,,

## 2024-04-09 PROCEDURE — 1160F RVW MEDS BY RX/DR IN RCRD: CPT | Mod: CPTII,S$GLB,, | Performed by: NURSE PRACTITIONER

## 2024-04-09 PROCEDURE — 99214 OFFICE O/P EST MOD 30 MIN: CPT | Mod: S$GLB,,,

## 2024-04-09 PROCEDURE — 3288F FALL RISK ASSESSMENT DOCD: CPT | Mod: CPTII,S$GLB,,

## 2024-04-09 PROCEDURE — 1159F MED LIST DOCD IN RCRD: CPT | Mod: CPTII,S$GLB,,

## 2024-04-09 PROCEDURE — 1126F AMNT PAIN NOTED NONE PRSNT: CPT | Mod: CPTII,S$GLB,, | Performed by: NURSE PRACTITIONER

## 2024-04-09 PROCEDURE — 1126F AMNT PAIN NOTED NONE PRSNT: CPT | Mod: CPTII,S$GLB,,

## 2024-04-09 PROCEDURE — 1101F PT FALLS ASSESS-DOCD LE1/YR: CPT | Mod: CPTII,S$GLB,,

## 2024-04-09 PROCEDURE — 3072F LOW RISK FOR RETINOPATHY: CPT | Mod: CPTII,S$GLB,, | Performed by: NURSE PRACTITIONER

## 2024-04-09 PROCEDURE — 3074F SYST BP LT 130 MM HG: CPT | Mod: CPTII,S$GLB,, | Performed by: NURSE PRACTITIONER

## 2024-04-09 PROCEDURE — 1170F FXNL STATUS ASSESSED: CPT | Mod: CPTII,S$GLB,, | Performed by: NURSE PRACTITIONER

## 2024-04-09 PROCEDURE — 1158F ADVNC CARE PLAN TLK DOCD: CPT | Mod: CPTII,S$GLB,, | Performed by: NURSE PRACTITIONER

## 2024-04-09 PROCEDURE — 99999 PR PBB SHADOW E&M-EST. PATIENT-LVL IV: CPT | Mod: PBBFAC,,, | Performed by: NURSE PRACTITIONER

## 2024-04-09 PROCEDURE — 99999 PR PBB SHADOW E&M-EST. PATIENT-LVL IV: CPT | Mod: PBBFAC,,,

## 2024-04-09 PROCEDURE — 1160F RVW MEDS BY RX/DR IN RCRD: CPT | Mod: CPTII,S$GLB,,

## 2024-04-09 PROCEDURE — G0439 PPPS, SUBSEQ VISIT: HCPCS | Mod: S$GLB,,, | Performed by: NURSE PRACTITIONER

## 2024-04-09 PROCEDURE — 3078F DIAST BP <80 MM HG: CPT | Mod: CPTII,S$GLB,, | Performed by: NURSE PRACTITIONER

## 2024-04-09 NOTE — PATIENT INSTRUCTIONS
Counseling and Referral of Other Preventative  (Italic type indicates deductible and co-insurance are waived)    Patient Name: Erica Masterson  Today's Date: 4/9/2024    Health Maintenance       Date Due Completion Date    Lipid Panel 08/08/2023 8/8/2022    Override on 9/26/2016: Done ([facility] record sent to scanning)    COVID-19 Vaccine (6 - 2023-24 season) 12/09/2023 10/14/2023    Diabetes Urine Screening 01/25/2024 1/25/2023    Hemoglobin A1c 04/25/2024 10/25/2023    Eye Exam 05/01/2024 5/1/2023    Override on 6/15/2020: Done    DEXA Scan 11/01/2026 11/1/2023    Colonoscopy 11/16/2026 11/16/2023    Override on 10/12/2010: Done (Dr. Solares, 5 year recheck)    TETANUS VACCINE 05/28/2033 5/28/2023        No orders of the defined types were placed in this encounter.    The following information is provided to all patients.  This information is to help you find resources for any of the problems found today that may be affecting your health:                  Living healthy guide: www.[facility].[place].gov      Understanding Diabetes: www.diabetes.org      Eating healthy: www.cdc.gov/healthyweight      CDC home safety checklist: www.cdc.gov/steadi/patient.html      Agency on Aging: www.goea.[place].[facility]      Alcoholics anonymous (AA): www.aa.org      Physical Activity: www.silas.nih.gov/gj7oznd      Tobacco use: www.quitwithusla.org

## 2024-04-09 NOTE — PROGRESS NOTES
"  Erica Masterson presented for a  Medicare AWV and comprehensive Health Risk Assessment today. The following components were reviewed and updated:    Medical history  Family History  Social history  Allergies and Current Medications  Health Risk Assessment  Health Maintenance  Care Team         ** See Completed Assessments for Annual Wellness Visit within the encounter summary.**         The following assessments were completed:  Living Situation  CAGE  Depression Screening  Timed Get Up and Go  Whisper Test  Cognitive Function Screening  Nutrition Screening  ADL Screening  PAQ Screening    Clock in media   Opioid documentation:      Patient does not have a current opioid prescription.        Vitals:    04/09/24 1012   BP: 126/67   Pulse: 66   Temp: 98.1 °F (36.7 °C)   TempSrc: Oral   SpO2: 96%   Weight: 74.9 kg (165 lb 2 oz)   Height: 5' 6" (1.676 m)     Body mass index is 26.65 kg/m².  Physical Exam  Constitutional:       Appearance: She is well-developed.   HENT:      Head: Normocephalic and atraumatic.      Nose: Nose normal.   Eyes:      General: Lids are normal.      Conjunctiva/sclera: Conjunctivae normal.   Cardiovascular:      Rate and Rhythm: Normal rate.   Pulmonary:      Effort: Pulmonary effort is normal.   Neurological:      Mental Status: She is alert and oriented to person, place, and time.   Psychiatric:         Speech: Speech normal.         Behavior: Behavior normal.               Diagnoses and health risks identified today and associated recommendations/orders:    1. Encounter for preventive health examination  Discussed health maintenance guidelines appropriate for age.        2. Hypertension associated with diabetes  Controlled, continue current medication regimen  Low salt diet  Increase physical activity  Followed by pcp      3. Hyperlipidemia associated with type 2 diabetes mellitus, baseline   Controlled, continue current medication regimen  Patient taking statin  Followed by " pcp      4. Controlled type 2 diabetes mellitus without complication, without long-term current use of insulin  Controlled, continue current medication regimen  Last HgA1c - 5.8  ADA diet  Increase physical activity   Followed by pcp      5. Atherosclerosis of aorta  Stable, continue to monitor   Followed by pcp        Provided Erica with a 5-10 year written screening schedule and personal prevention plan. Recommendations were developed using the USPSTF age appropriate recommendations. Education, counseling, and referrals were provided as needed. After Visit Summary printed and given to patient which includes a list of additional screenings\tests needed.    Follow up for One year for Annual Wellness Visit.    Maci Fonseca NP      I offered to discuss advanced care planning, including how to pick a person who would make decisions for you if you were unable to make them for yourself, called a health care power of , and what kind of decisions you might make such as use of life sustaining treatments such as ventilators and tube feeding when faced with a life limiting illness recorded on a living will that they will need to know. (How you want to be cared for as you near the end of your natural life)     X Patient is interested in learning more about how to make advanced directives.  I provided them paperwork and offered to discuss this with them.

## 2024-04-09 NOTE — PROGRESS NOTES
SUBJECTIVE:    Patient ID: Erica Masterson is a 78 y.o. female.    Chief Complaint: Follow-up  INTERVAL HPI:   Erica Masterson is a 78 y.o. female who presents today as an established patient for management of neck, hip, and foot pains. The patient is s/p ANJELICA at C7-T1 on 3/27/24 and reports of 75% relief of neck pain that was radiating down her shoulders. The patient reports of neck pain that is present at the base of her neck and with rotation of her neck. The patient was evaluated by Dr. Palm with Ortho, who recommended gel pads and PT. The patient has recently started PT for foot pain. The patient reports her bilateral foot pain is the worse of her pain.   The patient reports she exercises 3 x a week and has recently restarted yoga.  The patient reports that her pain is a 0/10. Patient denies of any urinary/fecal incontinence, saddle anesthesia, or weakness.      HPI: ( PER DR. WALTON)  She presents today for follow-up having completed her course of physical therapy for neck and low back pain.  She does find physical therapy beneficial however she is still not satisfied with her quality of life.  Her primary complaint is of low back pain at the lumbosacral junction radiates into the posterior portion of the right leg and into the calf.  Secondary complaint of posterior neck discomfort.  She is also complaining of some mid to lower thoracic pain.  All of these are familiar symptoms.  Her pain level is 5/10    Pain Scales  Best:/10  Worst:  Usually:  Today: 3/10    Follow-up  This is a chronic problem. The problem has been gradually improving. Associated symptoms include myalgias, neck pain and numbness. Pertinent negatives include no abdominal pain, arthralgias, chest pain, chills, diaphoresis, fatigue, fever, headaches, joint swelling, nausea, vomiting or weakness.   Neck Pain   Associated symptoms include numbness. Pertinent negatives include no chest pain, fever, headaches, trouble swallowing or weakness.      Interventional Pain Procedures:   3/27/2024: C7-T1 cervical interlaminar epidural steroid injection - 75% of radicular pain relief.   11/7/2023: Right knee steroid joint injection   7/20/2023: Interlaminar epidural steroid injection at L5-S1-80% relief.     Past Medical History:   Diagnosis Date    Adenomatous polyp of colon 3/26/2012    Allergy     Anxiety     Cancer     Cataract     Colon polyp     Degenerative arthritis of knee 04/03/2012    Diverticulosis     Encounter for blood transfusion     GERD (gastroesophageal reflux disease)     History of thyroid cancer 2021    Hyperlipidemia     Hypertension     Lateral meniscal tear 5/20/2013    Multinodular goiter 03/26/2012    Myopathy, unspecified 01/18/2010    Tuberculosis      Social History     Socioeconomic History    Marital status:    Tobacco Use    Smoking status: Never    Smokeless tobacco: Never   Substance and Sexual Activity    Alcohol use: Not Currently     Comment: stopped 2019    Drug use: No    Sexual activity: Not Currently     Social Determinants of Health     Financial Resource Strain: Low Risk  (4/9/2024)    Overall Financial Resource Strain (CARDIA)     Difficulty of Paying Living Expenses: Not hard at all   Food Insecurity: No Food Insecurity (4/9/2024)    Hunger Vital Sign     Worried About Running Out of Food in the Last Year: Never true     Ran Out of Food in the Last Year: Never true   Transportation Needs: No Transportation Needs (4/9/2024)    PRAPARE - Transportation     Lack of Transportation (Medical): No     Lack of Transportation (Non-Medical): No   Physical Activity: Sufficiently Active (4/9/2024)    Exercise Vital Sign     Days of Exercise per Week: 3 days     Minutes of Exercise per Session: 60 min   Stress: No Stress Concern Present (4/9/2024)    [employer] [place] [employer] - Occupational Stress Questionnaire     Feeling of Stress : Not at all   Social Connections: Moderately Integrated (4/9/2024)    Social  Connection and Isolation Panel [NHANES]     Frequency of Communication with Friends and Family: Three times a week     Frequency of Social Gatherings with Friends and Family: Three times a week     Attends [employer] Services: More than 4 times per year     Active Member of Clubs or Organizations: No     Attends Club or Organization Meetings: Never     Marital Status:    Housing Stability: Low Risk  (4/9/2024)    Housing Stability Vital Sign     Unable to Pay for Housing in the Last Year: No     Number of Places Lived in the Last Year: 1     Unstable Housing in the Last Year: No     Past Surgical History:   Procedure Laterality Date    BREAST BIOPSY Left 2015    benign    CHOLECYSTECTOMY      COLONOSCOPY  12/02/2015    Dr. Britton, multiple polyps, recheck five years-three years if more than 2 polyps are adenomatous    COLONOSCOPY N/A 12/02/2015    COLONOSCOPY N/A 03/20/2019    Procedure: COLONOSCOPY;  Surgeon: Jose Britton MD;  Location: [facility];  Service: Endoscopy;  Laterality: N/A;    COLONOSCOPY N/A 05/20/2020    Dr. Britton; internal hemorrhoids; diverticulosis; polyps removed; repeat in 3 years    COLONOSCOPY N/A 11/16/2023    Procedure: COLONOSCOPY(instructions mailed 11/9);  Surgeon: Jose Britton MD;  Location: [facility];  Service: Endoscopy;  Laterality: N/A;    CYSTOSCOPY N/A 08/26/2020    Procedure: CYSTOSCOPY;  Surgeon: Charlotte Walters Jr., MD;  Location: [facility] OR;  Service: Urology;  Laterality: N/A;    DILATION AND CURETTAGE OF UTERUS      DISSECTION OF NECK Left 09/13/2021    Procedure: DISSECTION, NECK;  Surgeon: Ryan Torres MD;  Location: [facility] OR [address] [place] [address];  Service: ENT;  Laterality: Left;    EPIDURAL STEROID INJECTION INTO CERVICAL SPINE N/A 3/27/2024    Procedure: Injection-steroid-epidural-cervical;  Surgeon: Julián Chiang MD;  Location: [facility] OR;  Service: Anesthesiology;  Laterality: N/A;  c7-T1    EPIDURAL STEROID INJECTION INTO LUMBAR SPINE N/A 7/20/2023    Procedure:  Injection-steroid-epidural-lumbar;  Surgeon: Julián Chiang MD;  Location: [facility] OR;  Service: Pain Management;  Laterality: N/A;  L5-s1    ESOPHAGEAL MANOMETRY WITH MEASUREMENT OF IMPEDANCE N/A 08/22/2022    Procedure: MANOMETRY, ESOPHAGUS, WITH IMPEDANCE MEASUREMENT;  Surgeon: Pantera Mcguire MD;  Location: [facility] (4TH FLR);  Service: Endoscopy;  Laterality: N/A;  8/4 fully vaccinated; instructions mailed-st    ESOPHAGOGASTRODUODENOSCOPY N/A 05/20/2020    Dr. Britton; small hiatal hernia; gastritis; 8 gastric polyps removed    ESOPHAGOGASTRODUODENOSCOPY N/A 04/01/2022    Procedure: EGD (ESOPHAGOGASTRODUODENOSCOPY);  Surgeon: Jose Britton MD;  Location: [facility];  Service: Endoscopy;  Laterality: N/A;    FOOT SURGERY      HYSTERECTOMY      TVH/BSO > 20 years    INCISION OF VULVA Bilateral 8/4/2023    Procedure: EXCISION, CYST, LABIA AND OTHER INDICATED PROCEDURES;  Surgeon: Mat Beach MD;  Location: [facility] OR;  Service: OB/GYN;  Laterality: Bilateral;  EXCYSION OF VULVAR CYST    INJECTION, SACROILIAC JOINT Right 12/1/2023    Procedure: INJECTION,SACROILIAC JOINT;  Surgeon: Julián Chiang MD;  Location: [facility] OR;  Service: Anesthesiology;  Laterality: Right;  sacroiliac injection    INTRALUMINAL GASTROINTESTINAL TRACT IMAGING VIA CAPSULE N/A 06/04/2020    Procedure: IMAGING PROCEDURE, GI TRACT, INTRALUMINAL, VIA CAPSULE;  Surgeon: Jose Britton MD;  Location: [facility];  Service: Endoscopy;  Laterality: N/A;    KNEE SURGERY  06/06/2013    right knee tear Dr Iverson     LAPAROSCOPIC CHOLECYSTECTOMY N/A 09/17/2020    Procedure: CHOLECYSTECTOMY, LAPAROSCOPIC;  Surgeon: Forrest Veronica MD;  Location: [facility];  Service: General;  Laterality: N/A;    LUNG LOBECTOMY  1966    right middle lobectomy, due to Tb    OOPHORECTOMY      SHOULDER SURGERY      francis     THYROIDECTOMY Bilateral 05/19/2021    Procedure: THYROIDECTOMY;  Surgeon: Jamia Amezquita MD;  Location: [facility] 2ND FLR;  Service: General;   "Laterality: Bilateral;    THYROIDECTOMY Bilateral 09/13/2021    Procedure: THYROIDECTOMY WITH INTRA-OP PTH;  Surgeon: Ryan Torres MD;  Location: [facility] OR [address] [place] [address];  Service: ENT;  Laterality: Bilateral;  NIM tube  Intraop PTH     Family History   Problem Relation Age of Onset    Cancer Mother         thyroid    Hypertension Mother     Hyperlipidemia Mother     Hypertension Father     Emphysema Father     Dementia Sister     Alzheimer's disease Sister     Breast cancer Sister 60    Cancer Brother         stomach    Cancer Brother     No Known Problems Daughter     Diabetes Son     Hypertension Son     Alcohol abuse Son     Diabetes Maternal Aunt     Alzheimer's disease Maternal Uncle     Obesity Paternal Uncle     Cancer Maternal Grandmother     Colon cancer Other     Prostate cancer Other     Melanoma Neg Hx     Psoriasis Neg Hx     Lupus Neg Hx     Eczema Neg Hx     Amblyopia Neg Hx     Blindness Neg Hx     Cataracts Neg Hx     Glaucoma Neg Hx     Macular degeneration Neg Hx     Retinal detachment Neg Hx     Strabismus Neg Hx     Stroke Neg Hx     Thyroid cancer Neg Hx     Colon polyps Neg Hx     Ulcerative colitis Neg Hx     Stomach cancer Neg Hx     Rectal cancer Neg Hx      Vitals:    04/09/24 1124   BP: 126/73   Pulse: 63   Weight: 74.9 kg (165 lb 2 oz)   Height: 5' 6" (1.676 m)         Review of Systems   Constitutional:  Negative for chills, diaphoresis, fatigue, fever and unexpected weight change.   HENT:  Negative for trouble swallowing.    Eyes:  Negative for visual disturbance.   Respiratory:  Negative for shortness of breath.    Cardiovascular:  Negative for chest pain.   Gastrointestinal:  Negative for abdominal pain, constipation, nausea and vomiting.   Genitourinary:  Negative for difficulty urinating.   Musculoskeletal:  Positive for back pain, gait problem, myalgias and neck pain. Negative for arthralgias, joint swelling and neck stiffness.   Neurological:  Positive for numbness. Negative for " dizziness, speech difficulty, weakness, light-headedness and headaches.   All other systems reviewed and are negative.         Objective:      Physical Exam  Vitals and nursing note reviewed.   Musculoskeletal:         General: Tenderness present.   Skin:     General: Skin is warm and dry.   Neurological:      Mental Status: She is alert and oriented to person, place, and time.   Psychiatric:         Mood and Affect: Mood normal.         LUMBAR SPINE  Lumbar spine: ROM is limited with flexion extension and oblique extension with increased pain with extension.     ((+)) Supine straight leg raise left side   ((-)) Facet loading   Internal and external rotation of the hip causes no increased pain .  Myofascial exam: Tenderness to palpation across lumbar parapsinous processes      (+) TTP at the SI joint to the right  ((+)) JOSE's test  (+)) One leg stand    ((+)) Distraction test  ((+)) Thigh thrust     Knee exam:    Extension/flexion equal bilaterally.   + crepitus with motion right knees.    - effusion both knees.    + joint line tenderness of lateral aspect of right knee     IMAGIN/5/23: xray of cspine:  FINDINGS:  There is evidence of prior extensive dental surgery.  There is loss of disc space height at C4/C5, C5/C6 and C6/C7.  Anterior and posterior osteophyte formation is noted at C4/C5 and C5/C6.  Bony foraminal narrowing is seen bilaterally in the midcervical region.  I do not see evidence of acute fracture or subluxation.  Surgical clips overlie the neck.     Impression:     Moderately severe mid cervical spondylosis with suggestion of progression in the midcervical region relative to 2020.    10/31/2023: XRAY OF RIGHT KNEE FINDINGS:  No acute fracture or malalignment.  Severe degenerative change lateral compartment right knee and minimal degenerative change elsewhere.  Trace right knee joint fluid.       Assessment:     Erica Masterson is  78 y.o. female who presents today as an established  patient.  The patient presents with bilateral foot pain consistent with neuropathy.    1. Neuropathy    2. DDD (degenerative disc disease), lumbar    3. DDD (degenerative disc disease), cervical                     Plan:     - Reviewed pertinent imaging and records with patient   - continue to monitor progress of ANJELICA   - Discussed cervical mbb/rfa in the future  - Recommended continue PT as ordered and utilize supportive shoes and gel insert as suggested by Dr. Palm   - discussed Qutenza, will review as option in the future.   - continue Gabapentin 100 mg qhs as tolerated.   - F/U 6 weeks or sooner if needed   Sania Stephen NP       Neuropathy    DDD (degenerative disc disease), lumbar    DDD (degenerative disc disease), cervical

## 2024-04-16 ENCOUNTER — TELEPHONE (OUTPATIENT)
Dept: FAMILY MEDICINE | Facility: CLINIC | Age: 79
End: 2024-04-16
Payer: MEDICARE

## 2024-04-16 NOTE — TELEPHONE ENCOUNTER
----- Message from Dinorah Cervantes, Patient Care Assistant sent at 4/16/2024  9:57 AM CDT -----  Regarding: appointment  Contact: pt  Type: Needs Medical Advice    Who Called:  pt     Best Call Back Number: [phone] (home)     Additional Information: pt states she would like a callback regarding an appointment on 4/29/24. Please call to advise. Thanks!

## 2024-04-16 NOTE — TELEPHONE ENCOUNTER
Patient advised she made the appt 4/29/24 to follow up on anxiety and use of medication so it is up to her if she wants to keep the appt.

## 2024-04-19 ENCOUNTER — LAB VISIT (OUTPATIENT)
Dept: LAB | Facility: HOSPITAL | Age: 79
End: 2024-04-19
Attending: PHYSICIAN ASSISTANT
Payer: MEDICARE

## 2024-04-19 DIAGNOSIS — R73.03 PRE-DIABETES: ICD-10-CM

## 2024-04-19 DIAGNOSIS — E55.9 HYPOVITAMINOSIS D: ICD-10-CM

## 2024-04-19 DIAGNOSIS — M85.88 OSTEOPENIA OF LUMBAR SPINE: ICD-10-CM

## 2024-04-19 DIAGNOSIS — E11.69 HYPERLIPIDEMIA ASSOCIATED WITH TYPE 2 DIABETES MELLITUS: Chronic | ICD-10-CM

## 2024-04-19 DIAGNOSIS — C73 THYROID CANCER: ICD-10-CM

## 2024-04-19 DIAGNOSIS — E78.5 HYPERLIPIDEMIA ASSOCIATED WITH TYPE 2 DIABETES MELLITUS: Chronic | ICD-10-CM

## 2024-04-19 LAB
25(OH)D3+25(OH)D2 SERPL-MCNC: 52 NG/ML (ref 30–96)
ALBUMIN SERPL BCP-MCNC: 3.5 G/DL (ref 3.5–5.2)
ANION GAP SERPL CALC-SCNC: 10 MMOL/L (ref 8–16)
BUN SERPL-MCNC: 19 MG/DL (ref 8–23)
CALCIUM SERPL-MCNC: 9 MG/DL (ref 8.7–10.5)
CHLORIDE SERPL-SCNC: 105 MMOL/L (ref 95–110)
CHOLEST SERPL-MCNC: 180 MG/DL (ref 120–199)
CHOLEST/HDLC SERPL: 2.7 {RATIO} (ref 2–5)
CO2 SERPL-SCNC: 26 MMOL/L (ref 23–29)
CREAT SERPL-MCNC: 1.1 MG/DL (ref 0.5–1.4)
EST. GFR  (NO RACE VARIABLE): 50.8 ML/MIN/1.73 M^2
ESTIMATED AVG GLUCOSE: 123 MG/DL (ref 68–131)
GLUCOSE SERPL-MCNC: 89 MG/DL (ref 70–110)
HBA1C MFR BLD: 5.9 % (ref 4–5.6)
HDLC SERPL-MCNC: 66 MG/DL (ref 40–75)
HDLC SERPL: 36.7 % (ref 20–50)
LDLC SERPL CALC-MCNC: 104.6 MG/DL (ref 63–159)
NONHDLC SERPL-MCNC: 114 MG/DL
PHOSPHATE SERPL-MCNC: 4 MG/DL (ref 2.7–4.5)
POTASSIUM SERPL-SCNC: 4.1 MMOL/L (ref 3.5–5.1)
SODIUM SERPL-SCNC: 141 MMOL/L (ref 136–145)
T4 FREE SERPL-MCNC: 1.12 NG/DL (ref 0.71–1.51)
TRIGL SERPL-MCNC: 47 MG/DL (ref 30–150)
TSH SERPL DL<=0.005 MIU/L-ACNC: 1.21 UIU/ML (ref 0.4–4)

## 2024-04-19 PROCEDURE — 84432 ASSAY OF THYROGLOBULIN: CPT | Performed by: PHYSICIAN ASSISTANT

## 2024-04-19 PROCEDURE — 84443 ASSAY THYROID STIM HORMONE: CPT | Performed by: PHYSICIAN ASSISTANT

## 2024-04-19 PROCEDURE — 80061 LIPID PANEL: CPT | Performed by: FAMILY MEDICINE

## 2024-04-19 PROCEDURE — 82306 VITAMIN D 25 HYDROXY: CPT | Performed by: PHYSICIAN ASSISTANT

## 2024-04-19 PROCEDURE — 83036 HEMOGLOBIN GLYCOSYLATED A1C: CPT | Performed by: PHYSICIAN ASSISTANT

## 2024-04-19 PROCEDURE — 80069 RENAL FUNCTION PANEL: CPT | Performed by: PHYSICIAN ASSISTANT

## 2024-04-19 PROCEDURE — 84439 ASSAY OF FREE THYROXINE: CPT | Performed by: PHYSICIAN ASSISTANT

## 2024-04-23 LAB
THRYOGLOBULIN INTERPRETATION: ABNORMAL
THYROGLOB AB SERPL-ACNC: <1.8 IU/ML
THYROGLOB SERPL-MCNC: 9.3 NG/ML

## 2024-04-26 ENCOUNTER — OFFICE VISIT (OUTPATIENT)
Dept: ENDOCRINOLOGY | Facility: CLINIC | Age: 79
End: 2024-04-26
Payer: MEDICARE

## 2024-04-26 VITALS
HEART RATE: 71 BPM | HEIGHT: 66 IN | OXYGEN SATURATION: 99 % | TEMPERATURE: 98 F | BODY MASS INDEX: 26.5 KG/M2 | WEIGHT: 164.88 LBS | SYSTOLIC BLOOD PRESSURE: 120 MMHG | DIASTOLIC BLOOD PRESSURE: 70 MMHG

## 2024-04-26 DIAGNOSIS — M85.88 OSTEOPENIA OF LUMBAR SPINE: ICD-10-CM

## 2024-04-26 DIAGNOSIS — E89.0 POSTOPERATIVE HYPOTHYROIDISM: ICD-10-CM

## 2024-04-26 DIAGNOSIS — C73 THYROID CANCER: Primary | ICD-10-CM

## 2024-04-26 DIAGNOSIS — E55.9 HYPOVITAMINOSIS D: ICD-10-CM

## 2024-04-26 DIAGNOSIS — R73.03 PRE-DIABETES: ICD-10-CM

## 2024-04-26 PROCEDURE — 1159F MED LIST DOCD IN RCRD: CPT | Mod: CPTII,S$GLB,, | Performed by: PHYSICIAN ASSISTANT

## 2024-04-26 PROCEDURE — 3072F LOW RISK FOR RETINOPATHY: CPT | Mod: CPTII,S$GLB,, | Performed by: PHYSICIAN ASSISTANT

## 2024-04-26 PROCEDURE — 1126F AMNT PAIN NOTED NONE PRSNT: CPT | Mod: CPTII,S$GLB,, | Performed by: PHYSICIAN ASSISTANT

## 2024-04-26 PROCEDURE — 99214 OFFICE O/P EST MOD 30 MIN: CPT | Mod: S$GLB,,, | Performed by: PHYSICIAN ASSISTANT

## 2024-04-26 PROCEDURE — 99999 PR PBB SHADOW E&M-EST. PATIENT-LVL V: CPT | Mod: PBBFAC,,, | Performed by: PHYSICIAN ASSISTANT

## 2024-04-26 PROCEDURE — 3078F DIAST BP <80 MM HG: CPT | Mod: CPTII,S$GLB,, | Performed by: PHYSICIAN ASSISTANT

## 2024-04-26 PROCEDURE — 3288F FALL RISK ASSESSMENT DOCD: CPT | Mod: CPTII,S$GLB,, | Performed by: PHYSICIAN ASSISTANT

## 2024-04-26 PROCEDURE — 1101F PT FALLS ASSESS-DOCD LE1/YR: CPT | Mod: CPTII,S$GLB,, | Performed by: PHYSICIAN ASSISTANT

## 2024-04-26 PROCEDURE — 3074F SYST BP LT 130 MM HG: CPT | Mod: CPTII,S$GLB,, | Performed by: PHYSICIAN ASSISTANT

## 2024-04-26 PROCEDURE — 1160F RVW MEDS BY RX/DR IN RCRD: CPT | Mod: CPTII,S$GLB,, | Performed by: PHYSICIAN ASSISTANT

## 2024-04-26 RX ORDER — LEVOTHYROXINE SODIUM 88 UG/1
88 TABLET ORAL
Qty: 30 TABLET | Refills: 11 | Status: SHIPPED | OUTPATIENT
Start: 2024-04-26 | End: 2025-04-26

## 2024-04-26 NOTE — PROGRESS NOTES
Of note, pt prefers phone call with results. Doesn't check portal all the time.     Chief Complaint:  Thyroid Cancer    HPI: Erica Masterson is a 78 y.o. female who is here for a follow-up evaluation for thyroid.     For her thyroid cancer:   Nodules were found several years ago, since at least 2019. Established care in endocrine here 3/2021 after repeat US still showed MNG. FNA recommended, completed 4/29/2021 and showed PTC.    Treated with total thyroidectomy on 5/2021.   Path: 2 cm, positive margin on the left. Also multifocal (4 foci, 2-5 mm)   right lobe: benign   no lymph nodes assessed   No angioinvasion, no no lymphatic invasion  Some tumor remained in situ, densely adherent/invading the trachea and RLN.    Post-op thyroglobulin: high, 91.   Presented at tumor board.  consensus was try for repeat surgery then radioactive iodine. Surgery team recommended CT to evaluate for LN.  CT done, found several abnormal lymph nodes, concerning for malignancy.  Saw ENT, had repeat surgery 9/13/2021 5/30 LN + for malignancy.    She was then treated with radioactive iodine 168.7 mCi on 12/15/2021.  Thyrogen-stimulated thyroglobulin level was 12 (TSH 92)  Post-treatment WBS 12/22/2021. Uptake in the neck, no distant disease noted.    Post-operative course has been complicated by neck tightness. Followed with ENT, s/p PT, seeing ortho as well with concerns for spinal accessory nerve injury.    Surveillance:  Last thyroglobulin:  Lab Results   Component Value Date    THYGLBTUM 9.3 (H) 04/19/2024    THGABSCRN <1.8 04/19/2024     Last neck US: 9/2022.   Reactive lymph nodes and scar tissue seen    With regards to her post-surgical hypothyroidism:  Current medication:  generic levothyroxine  Current dose: 75 mcg daily.     Lab Results   Component Value Date    TSH 1.206 04/19/2024    FREET4 1.12 04/19/2024     Had low vitamin D:   Vitamin D: was 11 (4/2021)    Last labs:  Lab Results   Component Value Date    CALCIUM 9.0  04/19/2024    ALBUMIN 3.5 04/19/2024    CREATININE 1.1 04/19/2024    [id number] 52 04/19/2024    PTH 41.0 05/22/2023      Now on 100,000 units/month    Bones: DXA 11/23. Osteopenia.   Spine: -2.4  Lfn; -1.6  Fx: 5.5%  Hfx: 1.1%    Current symptoms:  No   Yes  [x]    []  Trouble swallowing  [x]    []  Trouble breathing  []    [x]  Voice changes. After surgery. Improving.  [x]    []  Neck swelling    [x]    []  Fatigue. Energy okay, exercise a few days per week.  [x]    []  Constipation/diarrhea.  []    [x]  Heat/Cold intolerance  [x]    []  Weight gain or weight loss. Few lbs. Working on diet/exercise lately  [x]    []  Palpitations or tremor    Exercise:   Walking 30 min qd  [employer] stretching class    No falls, steriod injections or fractures.   Some back pains.     Reviewed past medical, family, social history and updated as appropriate.    Review of Systems  As above    Objective:     Vitals:    04/26/24 0830   BP: 120/70   Pulse: 71   Temp: 98.4 °F (36.9 °C)     BP Readings from Last 5 Encounters:   04/26/24 120/70   04/09/24 126/73   04/09/24 126/67   03/27/24 108/63   03/22/24 118/70     Physical Exam  Vitals reviewed.   Constitutional:       General: She is not in acute distress.  Neck:      Thyroid: No thyroid mass, thyromegaly or thyroid tenderness.      Comments: Absent thyroid, +scar   Pulmonary:      Effort: Pulmonary effort is normal.     Wt Readings from Last 10 Encounters:   04/26/24 0830 74.8 kg (164 lb 14.5 oz)   04/09/24 1124 74.9 kg (165 lb 2 oz)   04/09/24 1012 74.9 kg (165 lb 2 oz)   03/21/24 1011 74.4 kg (164 lb 0.4 oz)   03/22/24 0904 74.5 kg (164 lb 3.9 oz)   03/06/24 1024 74.4 kg (164 lb 0.4 oz)   03/04/24 0913 74.4 kg (164 lb)   02/28/24 1615 74.4 kg (164 lb 0.4 oz)   02/16/24 1503 74 kg (163 lb 4 oz)   02/06/24 1311 73 kg (161 lb)     Lab Results   Component Value Date    HGBA1C 5.9 (H) 04/19/2024     Lab Results   Component Value Date    CHOL 180 04/19/2024    HDL 66 04/19/2024    LDLCALC  104.6 04/19/2024    TRIG 47 04/19/2024    CHOLHDL 36.7 04/19/2024     Lab Results   Component Value Date     04/19/2024    K 4.1 04/19/2024     04/19/2024    CO2 26 04/19/2024    GLU 89 04/19/2024    BUN 19 04/19/2024    CREATININE 1.1 04/19/2024    CALCIUM 9.0 04/19/2024    PROT 6.9 01/30/2024    ALBUMIN 3.5 04/19/2024    BILITOT 0.5 01/30/2024    ALKPHOS 38 (L) 01/30/2024    AST 18 01/30/2024    ALT 15 01/30/2024    ANIONGAP 10 04/19/2024    ESTGFRAFRICA 56 (A) 07/19/2022    EGFRNONAA 48 (A) 07/19/2022    TSH 1.206 04/19/2024        Assessment/Plan:     Thyroid cancer  s/p surgery 5/2021. Multifocal PTC. 2 cm, +margin, +residual tissue.   Then s/p completion thyroidectomy 9/2021. Pathology with multiple LN + for cancer (5/30)  Post-operative thyroglobulin as high as 91 initially.  S/p radioactive iodine ablation with 168.7 mCi 12/15/2021.     - stimulated TG was 12     - post-treatment WBS negative for distant disease    For now, goal TSH on the low side.    Last thyroglobulin remains elevated, increasing from prior, up to 9.3.   higher than expected given surgery and the amount of iodine given.   with negative WBS on post-treatment study, not really finding many abnormalities on neck US, PET scan was negative      Postoperative hypothyroidism  Goal TSH <0.4  TFTs is elevated  Increase LT4 to 88 mcg qd    Osteopenia of lumbar spine   DXA 4/2021 -2.4 tscore in spine. FRAX not elevated.   continue vitamin D intake, calcium   avoid falls   repeat DXA 11/25  Continue exercise    Pre-Diabetes  A1c is stable      Thyroid u/s  TFTs, Tg in six weeks  Apt w/ Dr. Daugherty in six weeks  F/u in 6 mths w/ labs prior -tfts, tg, A1c, cmp, vd,

## 2024-05-01 ENCOUNTER — TELEPHONE (OUTPATIENT)
Dept: OPHTHALMOLOGY | Facility: CLINIC | Age: 79
End: 2024-05-01
Payer: MEDICARE

## 2024-05-01 NOTE — TELEPHONE ENCOUNTER
Spoke to pt and scheduled exam    ----- Message from Dede Thompson sent at 5/1/2024  2:02 PM CDT -----  Mariza Arana Staff  Caller: pt [phone] (Today,  9:33 AM)  Type: Needs Medical Advice  Who Called:  Pt    Best Call Back Number: [phone]    Additional Information: Pt calling to schedule her yearly hvf and dfe. Pls call back and advise

## 2024-05-02 ENCOUNTER — OFFICE VISIT (OUTPATIENT)
Dept: CARDIOLOGY | Facility: CLINIC | Age: 79
End: 2024-05-02
Payer: MEDICARE

## 2024-05-02 VITALS
BODY MASS INDEX: 25.98 KG/M2 | DIASTOLIC BLOOD PRESSURE: 80 MMHG | OXYGEN SATURATION: 100 % | WEIGHT: 161.69 LBS | HEIGHT: 66 IN | HEART RATE: 116 BPM | SYSTOLIC BLOOD PRESSURE: 120 MMHG

## 2024-05-02 DIAGNOSIS — I15.2 HYPERTENSION ASSOCIATED WITH DIABETES: Chronic | ICD-10-CM

## 2024-05-02 DIAGNOSIS — R00.0 TACHYCARDIA: Primary | ICD-10-CM

## 2024-05-02 DIAGNOSIS — E11.59 HYPERTENSION ASSOCIATED WITH DIABETES: Chronic | ICD-10-CM

## 2024-05-02 DIAGNOSIS — E11.69 HYPERLIPIDEMIA ASSOCIATED WITH TYPE 2 DIABETES MELLITUS: Chronic | ICD-10-CM

## 2024-05-02 DIAGNOSIS — E78.5 HYPERLIPIDEMIA ASSOCIATED WITH TYPE 2 DIABETES MELLITUS: Chronic | ICD-10-CM

## 2024-05-02 PROCEDURE — 93000 ELECTROCARDIOGRAM COMPLETE: CPT | Mod: S$GLB,,, | Performed by: INTERNAL MEDICINE

## 2024-05-02 PROCEDURE — 3072F LOW RISK FOR RETINOPATHY: CPT | Mod: CPTII,S$GLB,, | Performed by: INTERNAL MEDICINE

## 2024-05-02 PROCEDURE — 3288F FALL RISK ASSESSMENT DOCD: CPT | Mod: CPTII,S$GLB,, | Performed by: INTERNAL MEDICINE

## 2024-05-02 PROCEDURE — 1126F AMNT PAIN NOTED NONE PRSNT: CPT | Mod: CPTII,S$GLB,, | Performed by: INTERNAL MEDICINE

## 2024-05-02 PROCEDURE — 3074F SYST BP LT 130 MM HG: CPT | Mod: CPTII,S$GLB,, | Performed by: INTERNAL MEDICINE

## 2024-05-02 PROCEDURE — 1159F MED LIST DOCD IN RCRD: CPT | Mod: CPTII,S$GLB,, | Performed by: INTERNAL MEDICINE

## 2024-05-02 PROCEDURE — 1101F PT FALLS ASSESS-DOCD LE1/YR: CPT | Mod: CPTII,S$GLB,, | Performed by: INTERNAL MEDICINE

## 2024-05-02 PROCEDURE — 99999 PR PBB SHADOW E&M-EST. PATIENT-LVL V: CPT | Mod: PBBFAC,,, | Performed by: INTERNAL MEDICINE

## 2024-05-02 PROCEDURE — 1160F RVW MEDS BY RX/DR IN RCRD: CPT | Mod: CPTII,S$GLB,, | Performed by: INTERNAL MEDICINE

## 2024-05-02 PROCEDURE — 3079F DIAST BP 80-89 MM HG: CPT | Mod: CPTII,S$GLB,, | Performed by: INTERNAL MEDICINE

## 2024-05-02 PROCEDURE — 99213 OFFICE O/P EST LOW 20 MIN: CPT | Mod: S$GLB,,, | Performed by: INTERNAL MEDICINE

## 2024-05-02 NOTE — PROGRESS NOTES
Patient ID:  Erica Masterson is a 78 y.o. female who presents for follow-up of Hyperlipidemia, Hypertension, and Results (labs)      Hyperlipidemia associated with type 2 diabetes mellitus, baseline   Fair control on rosuvastatin     Hypertension associated with diabetes  Controlled on amlodipine 2.5, losartan 100 mg,     Today she went out to lunch and had some tea.  She forgot that she had an appointment today.  She realized it was kind of late she became very anxious.  She noticed that her heart has been racing since she got anxious.  Normally when she takes her blood pressure at home her pulse runs in the 70s.  Reviewing her EKGs usually her pulse is between 60 and 70.  Sh tammy denies any chest pain she has been going to the exercise 3 times a week on Monday Wednesdays and Fridays.        Past Medical History:   Diagnosis Date    Adenomatous polyp of colon 3/26/2012    Allergy     Anxiety     Cancer     Cataract     Colon polyp     Degenerative arthritis of knee 04/03/2012    Diverticulosis     Encounter for blood transfusion     GERD (gastroesophageal reflux disease)     History of thyroid cancer 2021    Hyperlipidemia     Hypertension     Lateral meniscal tear 5/20/2013    Multinodular goiter 03/26/2012    Myopathy, unspecified 01/18/2010    Tuberculosis         Past Surgical History:   Procedure Laterality Date    BREAST BIOPSY Left 2015    benign    CHOLECYSTECTOMY      COLONOSCOPY  12/02/2015    Dr. Britton, multiple polyps, recheck five years-three years if more than 2 polyps are adenomatous    COLONOSCOPY N/A 12/02/2015    COLONOSCOPY N/A 03/20/2019    Procedure: COLONOSCOPY;  Surgeon: Jose Britton MD;  Location: [facility];  Service: Endoscopy;  Laterality: N/A;    COLONOSCOPY N/A 05/20/2020    Dr. Britton; internal hemorrhoids; diverticulosis; polyps removed; repeat in 3 years    COLONOSCOPY N/A 11/16/2023    Procedure: COLONOSCOPY(instructions mailed 11/9);  Surgeon: Jose Britton MD;   Location: [facility] ENDO;  Service: Endoscopy;  Laterality: N/A;    CYSTOSCOPY N/A 08/26/2020    Procedure: CYSTOSCOPY;  Surgeon: Charlotte Walters Jr., MD;  Location: [facility];  Service: Urology;  Laterality: N/A;    DILATION AND CURETTAGE OF UTERUS      DISSECTION OF NECK Left 09/13/2021    Procedure: DISSECTION, NECK;  Surgeon: Ryan Torres MD;  Location: [facility] 2ND FLR;  Service: ENT;  Laterality: Left;    EPIDURAL STEROID INJECTION INTO CERVICAL SPINE N/A 3/27/2024    Procedure: Injection-steroid-epidural-cervical;  Surgeon: Julián Chiang MD;  Location: [facility] OR;  Service: Anesthesiology;  Laterality: N/A;  c7-T1    EPIDURAL STEROID INJECTION INTO LUMBAR SPINE N/A 7/20/2023    Procedure: Injection-steroid-epidural-lumbar;  Surgeon: Julián Chiang MD;  Location: [facility] OR;  Service: Pain Management;  Laterality: N/A;  L5-s1    ESOPHAGEAL MANOMETRY WITH MEASUREMENT OF IMPEDANCE N/A 08/22/2022    Procedure: MANOMETRY, ESOPHAGUS, WITH IMPEDANCE MEASUREMENT;  Surgeon: Pantera Mcguire MD;  Location: [facility] (4TH FLR);  Service: Endoscopy;  Laterality: N/A;  8/4 fully vaccinated; instructions mailed-st    ESOPHAGOGASTRODUODENOSCOPY N/A 05/20/2020    Dr. Britton; small hiatal hernia; gastritis; 8 gastric polyps removed    ESOPHAGOGASTRODUODENOSCOPY N/A 04/01/2022    Procedure: EGD (ESOPHAGOGASTRODUODENOSCOPY);  Surgeon: Jose Britton MD;  Location: [facility];  Service: Endoscopy;  Laterality: N/A;    FOOT SURGERY      HYSTERECTOMY      TVH/BSO > 20 years    INCISION OF VULVA Bilateral 8/4/2023    Procedure: EXCISION, CYST, LABIA AND OTHER INDICATED PROCEDURES;  Surgeon: Mat Beach MD;  Location: [facility];  Service: OB/GYN;  Laterality: Bilateral;  EXCYSION OF VULVAR CYST    INJECTION, SACROILIAC JOINT Right 12/1/2023    Procedure: INJECTION,SACROILIAC JOINT;  Surgeon: Julián Chiang MD;  Location: [facility] OR;  Service: Anesthesiology;  Laterality: Right;  sacroiliac injection    INTRALUMINAL GASTROINTESTINAL TRACT  IMAGING VIA CAPSULE N/A 06/04/2020    Procedure: IMAGING PROCEDURE, GI TRACT, INTRALUMINAL, VIA CAPSULE;  Surgeon: Jose Britton MD;  Location: [facility] ENDO;  Service: Endoscopy;  Laterality: N/A;    KNEE SURGERY  06/06/2013    right knee tear Dr Iverson     LAPAROSCOPIC CHOLECYSTECTOMY N/A 09/17/2020    Procedure: CHOLECYSTECTOMY, LAPAROSCOPIC;  Surgeon: Forrest Veronica MD;  Location: [facility] OR;  Service: General;  Laterality: N/A;    LUNG LOBECTOMY  1966    right middle lobectomy, due to Tb    OOPHORECTOMY      SHOULDER SURGERY      francis     THYROIDECTOMY Bilateral 05/19/2021    Procedure: THYROIDECTOMY;  Surgeon: Jamia Amezquita MD;  Location: [facility] OR 2ND FLR;  Service: General;  Laterality: Bilateral;    THYROIDECTOMY Bilateral 09/13/2021    Procedure: THYROIDECTOMY WITH INTRA-OP PTH;  Surgeon: Ryan Torres MD;  Location: [facility] OR 2ND FLR;  Service: ENT;  Laterality: Bilateral;  NIM tube  Intraop PTH          Current Outpatient Medications   Medication Instructions    amLODIPine (NORVASC) 2.5 mg, Oral    artificial tears (ISOPTO TEARS) 0.5 % ophthalmic solution 1 drop, As needed (PRN)    blood sugar diagnostic (BLOOD GLUCOSE TEST) Strp True Metrix glucose strips check once daily    blood-glucose meter kit True Metrix glucometer check glucose once daily    diazePAM (VALIUM) 2 mg, Oral, Nightly PRN    donepeziL (ARICEPT) 10 mg, Oral, Nightly    doxepin (SINEQUAN) 25 mg, Oral, Nightly    ergocalciferol (ERGOCALCIFEROL) 50,000 unit Cap Take one capsule twice monthly.    fluticasone propionate (FLONASE) 50 mcg, Each Nostril, Daily    gabapentin (NEURONTIN) 100 MG capsule 1 capsule    gabapentin (NEURONTIN) 200 mg, Oral, Nightly    ibuprofen (ADVIL,MOTRIN) 600 mg, Oral, Every 6 hours PRN    levothyroxine (SYNTHROID) 88 mcg, Oral, Before breakfast    loratadine (CLARITIN) 10 mg, Oral, Daily    losartan (COZAAR) 100 mg, Oral, Daily    rosuvastatin (CRESTOR) 10 mg, Oral, Daily    thiamine 100 mg, Oral, Daily     "tiZANidine (ZANAFLEX) 4 mg, Oral, Nightly    valACYclovir (VALTREX) 500 MG tablet Take one tablet (500mg) twice daily for three days for acute viral outbreak.  Start at the earliest sign of an outbreak, do not wait for visible rash (supply is enough for two outbreaks)        Review of patient's allergies indicates:  No Known Allergies     Review of Systems   Cardiovascular:  Negative for chest pain, dyspnea on exertion and palpitations.   Respiratory:  Negative for cough and shortness of breath.         Objective:     Vitals:    05/02/24 1502   BP: 120/80   BP Location: Left arm   Patient Position: Sitting   BP Method: Medium (Manual)   Pulse: (!) 116   SpO2: 100%   Weight: 73.4 kg (161 lb 11.3 oz)   Height: 5' 6" (1.676 m)       Physical Exam  Vitals and nursing note reviewed.   Constitutional:       Appearance: She is well-developed.   HENT:      Head: Normocephalic and atraumatic.   Eyes:      Conjunctiva/sclera: Conjunctivae normal.   Cardiovascular:      Rate and Rhythm: Normal rate and regular rhythm.      Heart sounds: Normal heart sounds.   Pulmonary:      Effort: Pulmonary effort is normal.      Breath sounds: Normal breath sounds.   Abdominal:      General: Bowel sounds are normal.      Palpations: Abdomen is soft.   Musculoskeletal:         General: Normal range of motion.   Skin:     General: Skin is warm and dry.   Neurological:      Mental Status: She is alert and oriented to person, place, and time.   Psychiatric:         Behavior: Behavior normal.         Thought Content: Thought content normal.         Judgment: Judgment normal.       CMP  Sodium   Date Value Ref Range Status   04/19/2024 141 136 - 145 mmol/L Final     Potassium   Date Value Ref Range Status   04/19/2024 4.1 3.5 - 5.1 mmol/L Final     Chloride   Date Value Ref Range Status   04/19/2024 105 95 - 110 mmol/L Final     CO2   Date Value Ref Range Status   04/19/2024 26 23 - 29 mmol/L Final     Glucose   Date Value Ref Range Status " Detail Level: Detailed   04/19/2024 89 70 - 110 mg/dL Final     BUN   Date Value Ref Range Status   04/19/2024 19 8 - 23 mg/dL Final     Creatinine   Date Value Ref Range Status   04/19/2024 1.1 0.5 - 1.4 mg/dL Final   06/04/2013 0.9 0.5 - 1.4 mg/dL Final     Calcium   Date Value Ref Range Status   04/19/2024 9.0 8.7 - 10.5 mg/dL Final   06/04/2013 9.7 8.7 - 10.5 mg/dL Final     Total Protein   Date Value Ref Range Status   01/30/2024 6.9 6.0 - 8.4 g/dL Final     Albumin   Date Value Ref Range Status   04/19/2024 3.5 3.5 - 5.2 g/dL Final     Total Bilirubin   Date Value Ref Range Status   01/30/2024 0.5 0.1 - 1.0 mg/dL Final     Comment:     For infants and newborns, interpretation of results should be based  on gestational age, weight and in agreement with clinical  observations.    Premature Infant recommended reference ranges:  Up to 24 hours.............<8.0 mg/dL  Up to 48 hours............<12.0 mg/dL  3-5 days..................<15.0 mg/dL  6-29 days.................<15.0 mg/dL       Alkaline Phosphatase   Date Value Ref Range Status   01/30/2024 38 (L) 55 - 135 U/L Final     AST   Date Value Ref Range Status   01/30/2024 18 10 - 40 U/L Final     ALT   Date Value Ref Range Status   01/30/2024 15 10 - 44 U/L Final     Anion Gap   Date Value Ref Range Status   04/19/2024 10 8 - 16 mmol/L Final   06/04/2013 9 5 - 15 meq/L Final     eGFR if    Date Value Ref Range Status   07/19/2022 56 (A) >60 mL/min/1.73 m^2 Final     eGFR if non    Date Value Ref Range Status   07/19/2022 48 (A) >60 mL/min/1.73 m^2 Final     Comment:     Calculation used to obtain the estimated glomerular filtration  rate (eGFR) is the CKD-EPI equation.         BMP  Lab Results   Component Value Date     04/19/2024    K 4.1 04/19/2024     04/19/2024    CO2 26 04/19/2024    BUN 19 04/19/2024    CREATININE 1.1 04/19/2024    CALCIUM 9.0 04/19/2024    ANIONGAP 10 04/19/2024    ESTGFRAFRICA 56 (A) 07/19/2022    EGFRNONAA 48 (A)  07/19/2022      BNP  @LABRCNTIP(BNP,BNPTRIAGEBLO)@   Lab Results   Component Value Date    CHOL 180 04/19/2024    CHOL 154 08/08/2022    CHOL 177 04/06/2022     Lab Results   Component Value Date    HDL 66 04/19/2024    HDL 59 08/08/2022    HDL 70 04/06/2022     Lab Results   Component Value Date    LDLCALC 104.6 04/19/2024    LDLCALC 86.4 08/08/2022    LDLCALC 97.4 04/06/2022     Lab Results   Component Value Date    TRIG 47 04/19/2024    TRIG 43 08/08/2022    TRIG 48 04/06/2022     Lab Results   Component Value Date    CHOLHDL 36.7 04/19/2024    CHOLHDL 38.3 08/08/2022    CHOLHDL 39.5 04/06/2022      Lab Results   Component Value Date    TSH 1.206 04/19/2024    FREET4 1.12 04/19/2024     Lab Results   Component Value Date    HGBA1C 5.9 (H) 04/19/2024     Lab Results   Component Value Date    WBC 5.00 01/25/2024    HGB 12.3 01/25/2024    HCT 39.0 01/25/2024    MCV 93 01/25/2024     01/25/2024         Results for orders placed during the hospital encounter of 08/06/22    Echo Saline Bubble? Yes    Interpretation Summary  · There is no evidence of intracardiac shunting.  · The left ventricle is normal in size with concentric remodeling and normal systolic function.  · The estimated ejection fraction is 65%.  · Grade I left ventricular diastolic dysfunction.  · Normal right ventricular size with normal right ventricular systolic function.  · Normal central venous pressure (3 mmHg).  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     EKG  Results for orders placed or performed during the hospital encounter of 08/16/22   EKG 12-lead    Collection Time: 08/16/22  3:24 AM    Narrative    Test Reason : R07.9,    Vent. Rate : 078 BPM     Atrial Rate : 078 BPM     P-R Int : 160 ms          QRS Dur : 090 ms      QT Int : 418 ms       P-R-T Axes : 066 045 071 degrees     QTc Int : 476 ms    Normal sinus rhythm  Normal ECG  When  compared with ECG of 12-AUG-2022 11:47,  No significant change was found  Confirmed by Travis CADENA, Dustin ALEJANDRO ([id number]) on 8/21/2022 12:41:23 PM    Referred By: ENEDINA   SELF           Confirmed By:Dustin Robles MD      Stress  Results for orders placed during the hospital encounter of 03/21/22    Nuclear Stress Test    Interpretation Summary    The nuclear portion of this study will be reported separately.    The EKG portion of this study is negative for ischemia.    The patient reported no chest pain during the stress test.    There were no arrhythmias during stress.             Assessment:       Hyperlipidemia associated with type 2 diabetes mellitus, baseline   Controlled on rosuvastatin    Hypertension associated with diabetes  Controlled on amlodipine 2.5 and losartan 100 mg    Tachycardia  Probably related to anxiety trying to get to the office as well as taking caffeine.       Plan:       Monitor her pulse at home if he is persistently greater than 90 beats per minute she is to call and a beta-blocker will be added.  She has been asked to decrease the caffeine intake.  She will be seen in the office in 6 months

## 2024-05-06 ENCOUNTER — TELEPHONE (OUTPATIENT)
Dept: ENDOCRINOLOGY | Facility: CLINIC | Age: 79
End: 2024-05-06
Payer: MEDICARE

## 2024-05-06 NOTE — TELEPHONE ENCOUNTER
----- Message from Narciso Fontana sent at 5/6/2024  1:48 PM CDT -----  Contact: self  Type: Needs Medical Advice  Who Called: Patient   Best Call Back Number: [phone]  Additional Information: Plz call the pt back. Thanks

## 2024-05-06 NOTE — TELEPHONE ENCOUNTER
Called and spoke with patient.  She did not know why she called.  She stated someone called her this morning.  I do not see an encounter.  Pt asked about her appts, explained appt date and times and places.  Printed out appt letter for Dr. Daugherty and Ms. Robbins and mailed to pts address.

## 2024-05-09 ENCOUNTER — TELEPHONE (OUTPATIENT)
Dept: PAIN MEDICINE | Facility: CLINIC | Age: 79
End: 2024-05-09
Payer: MEDICARE

## 2024-05-09 NOTE — TELEPHONE ENCOUNTER
----- Message from Frances Briggson sent at 5/9/2024  8:06 AM CDT -----  Contact: self  Type:  Needs Medical Advice    Who Called: self  Symptoms (please be specific): pt needs to know when provider will be leaving so she can know if she needs to cancel her upcoming appt and reschedule with another dr.   Would the patient rather a call back or a response via [facility]? call  Best Call Back Number: [phone] (home)     Additional Information: please advise and thank you.

## 2024-05-09 NOTE — TELEPHONE ENCOUNTER
Spoke with pt and advised that she will see Sania roberson this time and another provider going forward. She verbalizes understanding.

## 2024-05-18 DIAGNOSIS — I15.2 HYPERTENSION ASSOCIATED WITH DIABETES: ICD-10-CM

## 2024-05-18 DIAGNOSIS — R20.0 NUMBNESS AND TINGLING: ICD-10-CM

## 2024-05-18 DIAGNOSIS — E11.59 HYPERTENSION ASSOCIATED WITH DIABETES: ICD-10-CM

## 2024-05-18 DIAGNOSIS — R20.2 NUMBNESS AND TINGLING: ICD-10-CM

## 2024-05-18 NOTE — TELEPHONE ENCOUNTER
No care due was identified.  [facility] Embedded Care Due Messages. Reference number: [id number].   5/18/2024 1:28:51 PM CDT

## 2024-05-20 RX ORDER — GABAPENTIN 100 MG/1
200 CAPSULE ORAL NIGHTLY
Qty: 60 CAPSULE | Refills: 0 | Status: SHIPPED | OUTPATIENT
Start: 2024-05-20

## 2024-05-21 RX ORDER — AMLODIPINE BESYLATE 2.5 MG/1
2.5 TABLET ORAL
Qty: 30 TABLET | Refills: 6 | Status: SHIPPED | OUTPATIENT
Start: 2024-05-21

## 2024-05-28 ENCOUNTER — OFFICE VISIT (OUTPATIENT)
Dept: PAIN MEDICINE | Facility: CLINIC | Age: 79
End: 2024-05-28
Payer: MEDICARE

## 2024-05-28 VITALS
SYSTOLIC BLOOD PRESSURE: 143 MMHG | BODY MASS INDEX: 26.01 KG/M2 | HEIGHT: 66 IN | DIASTOLIC BLOOD PRESSURE: 70 MMHG | WEIGHT: 161.81 LBS | HEART RATE: 60 BPM

## 2024-05-28 DIAGNOSIS — M50.30 DDD (DEGENERATIVE DISC DISEASE), CERVICAL: ICD-10-CM

## 2024-05-28 DIAGNOSIS — M79.18 CERVICAL MYOFASCIAL PAIN SYNDROME: Primary | ICD-10-CM

## 2024-05-28 DIAGNOSIS — M54.12 CERVICAL RADICULOPATHY: ICD-10-CM

## 2024-05-28 PROCEDURE — 3288F FALL RISK ASSESSMENT DOCD: CPT | Mod: CPTII,S$GLB,,

## 2024-05-28 PROCEDURE — 1159F MED LIST DOCD IN RCRD: CPT | Mod: CPTII,S$GLB,,

## 2024-05-28 PROCEDURE — 1101F PT FALLS ASSESS-DOCD LE1/YR: CPT | Mod: CPTII,S$GLB,,

## 2024-05-28 PROCEDURE — 99214 OFFICE O/P EST MOD 30 MIN: CPT | Mod: 25,S$GLB,,

## 2024-05-28 PROCEDURE — 99999 PR PBB SHADOW E&M-EST. PATIENT-LVL IV: CPT | Mod: PBBFAC,,,

## 2024-05-28 PROCEDURE — 1160F RVW MEDS BY RX/DR IN RCRD: CPT | Mod: CPTII,S$GLB,,

## 2024-05-28 PROCEDURE — 3077F SYST BP >= 140 MM HG: CPT | Mod: CPTII,S$GLB,,

## 2024-05-28 PROCEDURE — 3078F DIAST BP <80 MM HG: CPT | Mod: CPTII,S$GLB,,

## 2024-05-28 PROCEDURE — 1125F AMNT PAIN NOTED PAIN PRSNT: CPT | Mod: CPTII,S$GLB,,

## 2024-05-28 PROCEDURE — 3072F LOW RISK FOR RETINOPATHY: CPT | Mod: CPTII,S$GLB,,

## 2024-05-28 PROCEDURE — 20552 NJX 1/MLT TRIGGER POINT 1/2: CPT | Mod: S$GLB,,,

## 2024-05-28 NOTE — PROCEDURES
Trigger Point Injection    Performed by: Sania Stephen NP  Authorized by: Sania Stephen, NP    Cervical Paraspinal:  Left  Rhomboid:  Left    Consent Done?:  Yes (Written)    Pre-Procedure:   Indications:  Pain relief  Site marked: the procedure site was marked     Timeout: prior to procedure the correct patient, procedure, and site was verified    Prep: patient was prepped and draped in usual sterile fashion     Local anesthesia used?: Yes    Anesthesia:  Local infiltration  Local anesthetic:  Bupivacaine 0.25% without epinephrine  Anesthetic total (ml):  5    Trigger point injection   Pre-procedure diagnosis: Myofascial trigger points in cervical muscles  Post-procedure diagnosis: Same    Timeout performed.  Upon examination, 4 trigger points were noted in the above noted regions.   After the procedure was described and informed consent obtained, the skin over the trigger points was cleaned with isopropyl alcohol. Using a 25-gauge needle, injection of 1ml of 0.25%bupvicaine  was injected into each trigger point. The patient noted concordant radiating pain upon injection of her trigger points. The skin was then cleaned and bandages were applied to sites as necessary. Blood loss was <2ml. We observed the patient for 10 minutes after the procedure for any complications, and as there were none noted, the patient was then discharged home with instructions. We advised the patient that during the duration of the local anesthetic effect, this would be the optimal time to perform stretching exercises for the muscles which contain the trigger points. We also advised the patient to continue these exercises daily as this would likely give the best chance of resolution of the trigger points.

## 2024-05-28 NOTE — PROGRESS NOTES
"  SUBJECTIVE:    Patient ID: Erica Masterson is a 78 y.o. female.    Chief Complaint: Cervical Spine Pain (C-spine) (3/27/24 EPI C7-T1//pt states that she is having a little tightness )  INTERVAL HPI:   Erica Masterson is a 78 y.o. female who presents today as an established patient for management of neck, hip, and foot pains. The patient reports of continued "tightness" in the left side of her neck. The patient reports radicular symptoms are still improved.  The patient reports of neck pain that is present at the base of her neck and with rotation of her neck. The patient was evaluated by Dr. Palm with Ortho, who recommended gel pads and PT. The patient has recently started PT for foot pain. The patient reports her bilateral foot pain is the worse of her pain.   The patient reports she exercises 3 x a week and has recently restarted yoga. The patient denies benefit with PT in the past.   The patient reports that her pain is a 1/10. Patient denies of any urinary/fecal incontinence, saddle anesthesia, or weakness.      HPI: ( PER DR. WALTON)  She presents today for follow-up having completed her course of physical therapy for neck and low back pain.  She does find physical therapy beneficial however she is still not satisfied with her quality of life.  Her primary complaint is of low back pain at the lumbosacral junction radiates into the posterior portion of the right leg and into the calf.  Secondary complaint of posterior neck discomfort.  She is also complaining of some mid to lower thoracic pain.  All of these are familiar symptoms.  Her pain level is 5/10    Pain Scales  Best:/10  Worst:  Usually:  Today: 3/10    Follow-up  This is a chronic problem. The problem has been gradually improving. Associated symptoms include myalgias, neck pain and numbness. Pertinent negatives include no abdominal pain, arthralgias, chest pain, chills, diaphoresis, fatigue, fever, headaches, joint swelling, nausea, vomiting or " weakness.   Neck Pain   Associated symptoms include numbness. Pertinent negatives include no chest pain, fever, headaches, trouble swallowing or weakness.     Interventional Pain Procedures:   3/27/2024: C7-T1 cervical interlaminar epidural steroid injection - 75% of radicular pain relief.   11/7/2023: Right knee steroid joint injection   7/20/2023: Interlaminar epidural steroid injection at L5-S1-80% relief.     Past Medical History:   Diagnosis Date    Adenomatous polyp of colon 3/26/2012    Allergy     Anxiety     Cancer     Cataract     Colon polyp     Degenerative arthritis of knee 04/03/2012    Diverticulosis     Encounter for blood transfusion     GERD (gastroesophageal reflux disease)     History of thyroid cancer 2021    Hyperlipidemia     Hypertension     Lateral meniscal tear 5/20/2013    Multinodular goiter 03/26/2012    Myopathy, unspecified 01/18/2010    Tuberculosis      Social History     Socioeconomic History    Marital status:    Tobacco Use    Smoking status: Never    Smokeless tobacco: Never   Substance and Sexual Activity    Alcohol use: Not Currently     Comment: stopped 2019    Drug use: No    Sexual activity: Not Currently     Social Determinants of Health     Financial Resource Strain: Low Risk  (4/9/2024)    Overall Financial Resource Strain (CARDIA)     Difficulty of Paying Living Expenses: Not hard at all   Food Insecurity: No Food Insecurity (4/9/2024)    Hunger Vital Sign     Worried About Running Out of Food in the Last Year: Never true     Ran Out of Food in the Last Year: Never true   Transportation Needs: No Transportation Needs (4/9/2024)    PRAPARE - Transportation     Lack of Transportation (Medical): No     Lack of Transportation (Non-Medical): No   Physical Activity: Sufficiently Active (4/9/2024)    Exercise Vital Sign     Days of Exercise per Week: 3 days     Minutes of Exercise per Session: 60 min   Stress: No Stress Concern Present (4/9/2024)    [facility] of  Occupational Health - Occupational Stress Questionnaire     Feeling of Stress : Not at all   Housing Stability: Low Risk  (4/9/2024)    Housing Stability Vital Sign     Unable to Pay for Housing in the Last Year: No     Number of Places Lived in the Last Year: 1     Unstable Housing in the Last Year: No     Past Surgical History:   Procedure Laterality Date    BREAST BIOPSY Left 2015    benign    CHOLECYSTECTOMY      COLONOSCOPY  12/02/2015    Dr. Britton, multiple polyps, recheck five years-three years if more than 2 polyps are adenomatous    COLONOSCOPY N/A 12/02/2015    COLONOSCOPY N/A 03/20/2019    Procedure: COLONOSCOPY;  Surgeon: Jose Britton MD;  Location: [facility];  Service: Endoscopy;  Laterality: N/A;    COLONOSCOPY N/A 05/20/2020    Dr. Britton; internal hemorrhoids; diverticulosis; polyps removed; repeat in 3 years    COLONOSCOPY N/A 11/16/2023    Procedure: COLONOSCOPY(instructions mailed 11/9);  Surgeon: Jose Britton MD;  Location: [facility];  Service: Endoscopy;  Laterality: N/A;    CYSTOSCOPY N/A 08/26/2020    Procedure: CYSTOSCOPY;  Surgeon: Charlotte Walters Jr., MD;  Location: [facility] OR;  Service: Urology;  Laterality: N/A;    DILATION AND CURETTAGE OF UTERUS      DISSECTION OF NECK Left 09/13/2021    Procedure: DISSECTION, NECK;  Surgeon: Ryan Torres MD;  Location: [facility] OR [address] [place] [address];  Service: ENT;  Laterality: Left;    EPIDURAL STEROID INJECTION INTO CERVICAL SPINE N/A 3/27/2024    Procedure: Injection-steroid-epidural-cervical;  Surgeon: Julián Chiang MD;  Location: [facility] OR;  Service: Anesthesiology;  Laterality: N/A;  c7-T1    EPIDURAL STEROID INJECTION INTO LUMBAR SPINE N/A 7/20/2023    Procedure: Injection-steroid-epidural-lumbar;  Surgeon: Julián Chiang MD;  Location: [facility] OR;  Service: Pain Management;  Laterality: N/A;  L5-s1    ESOPHAGEAL MANOMETRY WITH MEASUREMENT OF IMPEDANCE N/A 08/22/2022    Procedure: MANOMETRY, ESOPHAGUS, WITH IMPEDANCE MEASUREMENT;  Surgeon: Pantera  MD Maynor;  Location: [facility] ENDO (4TH FLR);  Service: Endoscopy;  Laterality: N/A;  8/4 fully vaccinated; instructions mailed-st    ESOPHAGOGASTRODUODENOSCOPY N/A 05/20/2020    Dr. Britton; small hiatal hernia; gastritis; 8 gastric polyps removed    ESOPHAGOGASTRODUODENOSCOPY N/A 04/01/2022    Procedure: EGD (ESOPHAGOGASTRODUODENOSCOPY);  Surgeon: Jose Britton MD;  Location: [facility];  Service: Endoscopy;  Laterality: N/A;    FOOT SURGERY      HYSTERECTOMY      TVH/BSO > 20 years    INCISION OF VULVA Bilateral 8/4/2023    Procedure: EXCISION, CYST, LABIA AND OTHER INDICATED PROCEDURES;  Surgeon: Mat Beach MD;  Location: [facility] OR;  Service: OB/GYN;  Laterality: Bilateral;  EXCYSION OF VULVAR CYST    INJECTION, SACROILIAC JOINT Right 12/1/2023    Procedure: INJECTION,SACROILIAC JOINT;  Surgeon: Julián Chiang MD;  Location: [facility] OR;  Service: Anesthesiology;  Laterality: Right;  sacroiliac injection    INTRALUMINAL GASTROINTESTINAL TRACT IMAGING VIA CAPSULE N/A 06/04/2020    Procedure: IMAGING PROCEDURE, GI TRACT, INTRALUMINAL, VIA CAPSULE;  Surgeon: Jose Britton MD;  Location: [facility];  Service: Endoscopy;  Laterality: N/A;    KNEE SURGERY  06/06/2013    right knee tear Dr Iverson     LAPAROSCOPIC CHOLECYSTECTOMY N/A 09/17/2020    Procedure: CHOLECYSTECTOMY, LAPAROSCOPIC;  Surgeon: Forrest Veronica MD;  Location: [facility];  Service: General;  Laterality: N/A;    LUNG LOBECTOMY  1966    right middle lobectomy, due to Tb    OOPHORECTOMY      SHOULDER SURGERY      francis     THYROIDECTOMY Bilateral 05/19/2021    Procedure: THYROIDECTOMY;  Surgeon: Jamia Amezquita MD;  Location: [facility] OR 2ND FLR;  Service: General;  Laterality: Bilateral;    THYROIDECTOMY Bilateral 09/13/2021    Procedure: THYROIDECTOMY WITH INTRA-OP PTH;  Surgeon: Ryan Torres MD;  Location: [facility] OR 2ND FLR;  Service: ENT;  Laterality: Bilateral;  NIM tube  Intraop PTH     Family History   Problem Relation Name Age of Onset     "Cancer Mother          thyroid    Hypertension Mother      Hyperlipidemia Mother      Hypertension Father      Emphysema Father      Dementia Sister      Alzheimer's disease Sister      Breast cancer Sister  60    Cancer Brother          stomach    Cancer Brother      No Known Problems Daughter      Diabetes Son      Hypertension Son      Alcohol abuse Son      Diabetes Maternal Aunt      Alzheimer's disease Maternal Uncle      Obesity Paternal Uncle      Cancer Maternal Grandmother      Colon cancer Other nephew     Prostate cancer Other nephew     Melanoma Neg Hx      Psoriasis Neg Hx      Lupus Neg Hx      Eczema Neg Hx      Amblyopia Neg Hx      Blindness Neg Hx      Cataracts Neg Hx      Glaucoma Neg Hx      Macular degeneration Neg Hx      Retinal detachment Neg Hx      Strabismus Neg Hx      Stroke Neg Hx      Thyroid cancer Neg Hx      Colon polyps Neg Hx      Ulcerative colitis Neg Hx      Stomach cancer Neg Hx      Rectal cancer Neg Hx       Vitals:    05/28/24 0906   BP: (!) 143/70   Pulse: 60   Weight: 73.4 kg (161 lb 13.1 oz)   Height: 5' 6" (1.676 m)         Review of Systems   Constitutional:  Negative for chills, diaphoresis, fatigue, fever and unexpected weight change.   HENT:  Negative for trouble swallowing.    Eyes:  Negative for visual disturbance.   Respiratory:  Negative for shortness of breath.    Cardiovascular:  Negative for chest pain.   Gastrointestinal:  Negative for abdominal pain, constipation, nausea and vomiting.   Genitourinary:  Negative for difficulty urinating.   Musculoskeletal:  Positive for back pain, gait problem, myalgias and neck pain. Negative for arthralgias, joint swelling and neck stiffness.   Neurological:  Positive for numbness. Negative for dizziness, speech difficulty, weakness, light-headedness and headaches.   All other systems reviewed and are negative.         Objective:      Physical Exam  Vitals and nursing note reviewed.   Musculoskeletal:         General: " Tenderness present.   Skin:     General: Skin is warm and dry.   Neurological:      Mental Status: She is alert and oriented to person, place, and time.   Psychiatric:         Mood and Affect: Mood normal.         LUMBAR SPINE  Lumbar spine: ROM is limited with flexion extension and oblique extension with increased pain with extension.     ((+)) Supine straight leg raise left side   ((-)) Facet loading   Internal and external rotation of the hip causes no increased pain .  Myofascial exam: Tenderness to palpation across lumbar parapsinous processes      (+) TTP at the SI joint to the right  ((+)) JOSE's test  (+)) One leg stand    ((+)) Distraction test  ((+)) Thigh thrust     Knee exam:    Extension/flexion equal bilaterally.   + crepitus with motion right knees.    - effusion both knees.    + joint line tenderness of lateral aspect of right knee     IMAGIN/5/23: xray of cspine:  FINDINGS:  There is evidence of prior extensive dental surgery.  There is loss of disc space height at C4/C5, C5/C6 and C6/C7.  Anterior and posterior osteophyte formation is noted at C4/C5 and C5/C6.  Bony foraminal narrowing is seen bilaterally in the midcervical region.  I do not see evidence of acute fracture or subluxation.  Surgical clips overlie the neck.     Impression:     Moderately severe mid cervical spondylosis with suggestion of progression in the midcervical region relative to 2020.    10/31/2023: XRAY OF RIGHT KNEE FINDINGS:  No acute fracture or malalignment.  Severe degenerative change lateral compartment right knee and minimal degenerative change elsewhere.  Trace right knee joint fluid.       Assessment:     Erica Masterson is  78 y.o. female who presents today as an established patient.  The patient presents with left sided cervical myofascial tightness.   1. Cervical myofascial pain syndrome    2. DDD (degenerative disc disease), cervical    3. Cervical radiculopathy                       Plan:     -  Reviewed pertinent imaging and records with patient   - continue to monitor progress of ANJELICA   - Discussed cervical mbb/rfa in the future  - Recommended continue PT as ordered and utilize supportive shoes and gel insert as suggested by Dr. Palm   - Discussed Cervical TPIs with local anesthetic only, pt elected to proceed. TPIs provided in office. Written consent obtained.   - Recommended PT or HEP for neck stretches   - continue Gabapentin 100 mg qhs as tolerated.   - F/U 4 weeks or sooner if needed to assess effectiveness of TPI   Sania Stephen, VIKA       Cervical myofascial pain syndrome    DDD (degenerative disc disease), cervical    Cervical radiculopathy

## 2024-06-10 ENCOUNTER — OFFICE VISIT (OUTPATIENT)
Dept: FAMILY MEDICINE | Facility: CLINIC | Age: 79
End: 2024-06-10
Payer: MEDICARE

## 2024-06-10 ENCOUNTER — TELEPHONE (OUTPATIENT)
Dept: FAMILY MEDICINE | Facility: CLINIC | Age: 79
End: 2024-06-10
Payer: MEDICARE

## 2024-06-10 ENCOUNTER — TELEPHONE (OUTPATIENT)
Dept: FAMILY MEDICINE | Facility: CLINIC | Age: 79
End: 2024-06-10

## 2024-06-10 VITALS
WEIGHT: 165.38 LBS | TEMPERATURE: 98 F | OXYGEN SATURATION: 99 % | BODY MASS INDEX: 26.58 KG/M2 | SYSTOLIC BLOOD PRESSURE: 110 MMHG | HEIGHT: 66 IN | DIASTOLIC BLOOD PRESSURE: 60 MMHG | HEART RATE: 68 BPM

## 2024-06-10 DIAGNOSIS — L98.9 SKIN LESION: ICD-10-CM

## 2024-06-10 DIAGNOSIS — L53.9 REDNESS OF SKIN: Primary | ICD-10-CM

## 2024-06-10 PROCEDURE — 3072F LOW RISK FOR RETINOPATHY: CPT | Mod: CPTII,S$GLB,, | Performed by: NURSE PRACTITIONER

## 2024-06-10 PROCEDURE — 3074F SYST BP LT 130 MM HG: CPT | Mod: CPTII,S$GLB,, | Performed by: NURSE PRACTITIONER

## 2024-06-10 PROCEDURE — 1159F MED LIST DOCD IN RCRD: CPT | Mod: CPTII,S$GLB,, | Performed by: NURSE PRACTITIONER

## 2024-06-10 PROCEDURE — 3078F DIAST BP <80 MM HG: CPT | Mod: CPTII,S$GLB,, | Performed by: NURSE PRACTITIONER

## 2024-06-10 PROCEDURE — 99214 OFFICE O/P EST MOD 30 MIN: CPT | Mod: S$GLB,,, | Performed by: NURSE PRACTITIONER

## 2024-06-10 PROCEDURE — 99999 PR PBB SHADOW E&M-EST. PATIENT-LVL V: CPT | Mod: PBBFAC,,, | Performed by: NURSE PRACTITIONER

## 2024-06-10 PROCEDURE — 3288F FALL RISK ASSESSMENT DOCD: CPT | Mod: CPTII,S$GLB,, | Performed by: NURSE PRACTITIONER

## 2024-06-10 PROCEDURE — 1160F RVW MEDS BY RX/DR IN RCRD: CPT | Mod: CPTII,S$GLB,, | Performed by: NURSE PRACTITIONER

## 2024-06-10 PROCEDURE — 1101F PT FALLS ASSESS-DOCD LE1/YR: CPT | Mod: CPTII,S$GLB,, | Performed by: NURSE PRACTITIONER

## 2024-06-10 PROCEDURE — 1125F AMNT PAIN NOTED PAIN PRSNT: CPT | Mod: CPTII,S$GLB,, | Performed by: NURSE PRACTITIONER

## 2024-06-10 RX ORDER — CEPHALEXIN 500 MG/1
500 CAPSULE ORAL 4 TIMES DAILY
Qty: 28 CAPSULE | Refills: 0 | Status: SHIPPED | OUTPATIENT
Start: 2024-06-10 | End: 2024-06-17

## 2024-06-10 RX ORDER — AMOXICILLIN AND CLAVULANATE POTASSIUM 875; 125 MG/1; MG/1
1 TABLET, FILM COATED ORAL EVERY 12 HOURS
Qty: 14 TABLET | Refills: 0 | Status: SHIPPED | OUTPATIENT
Start: 2024-06-10 | End: 2024-06-10

## 2024-06-10 NOTE — TELEPHONE ENCOUNTER
----- Message from Nancy Sands sent at 6/10/2024 11:36 AM CDT -----  Regarding: RX Refill Clarification  Contact: [facility] at [phone]  Type:  RX Refill Clarification    Who Called:    [id number]  [address]  [place] [address]  Phone: [phone] Fax: [phone]      Additional Information:  calling to see if both cephalexin and augmentin was both called in today. Please call and advise. Thank you    cephALEXin (KEFLEX) 500 MG capsule 28 capsule 0 6/10/2024 6/17/2024   Sig - Route: Take 1 capsule (500 mg total) by mouth 4 (four) times daily. for 7 days - Oral   Sent to pharmacy as: cephALEXin (KEFLEX) 500 MG capsule   E-Prescribing Status: Receipt confirmed by pharmacy (6/10/2024 11:04 AM CDT)

## 2024-06-10 NOTE — PROGRESS NOTES
Subjective:       Patient ID: Erica Masterson is a 78 y.o. female.    Chief Complaint: pain in right breast     HPI   79 y/o female patient with medical problems listed below presents for a spot and skin redness around it on right breast for a week. Denies recent trauma to the affected area. Denies nipple discharges, fever, chills, generalized body ache or weakness.    Patient also states has a non-painful skin lesion on left upper buttock.     Labs reviewed from 4/2024    Patient Active Problem List   Diagnosis    Diverticulosis    Myopathy, unspecified    Hyperlipidemia associated with type 2 diabetes mellitus, baseline     Depression    Chronic low back pain    Degenerative arthritis of knee    Hereditary and idiopathic peripheral neuropathy    Hypertension associated with diabetes    PAD (peripheral artery disease)    GERD (gastroesophageal reflux disease)    Cardiovascular event risk, ASCVD 10-year risk 13%, 2010    Hypertensive left ventricular hypertrophy, without heart failure    SI (sacroiliac) joint dysfunction    PAC (premature atrial contraction), frequent on Holter, 34%, over 33,200    Atrial septal aneurysm    Sleep disorder    History of colon polyps    Controlled type 2 diabetes mellitus without complication, without long-term current use of insulin    Iron deficiency anemia    RONNIE (generalized anxiety disorder), 2019    Elevated ALT measurement    Stage 3a chronic kidney disease    Biliary colic    Age-related osteoporosis without current pathological fracture    Vitamin D insufficiency    Thyroid cancer    Postoperative hypothyroidism    Spinal accessory nerve disorder    Decreased range of motion of intervertebral discs of cervical spine    Muscle spasms of both lower extremities    Osteopenia of lumbar spine    Metastasis from thyroid cancer    Hypocalcemia    Decreased strength of upper extremity    Hypoparathyroidism, unspecified hypoparathyroidism type    Polyneuropathy    Lumbar  radiculopathy, chronic    Range of motion deficit    Tachycardia      Review of patient's allergies indicates:  No Known Allergies    Past Surgical History:   Procedure Laterality Date    BREAST BIOPSY Left 2015    benign    CHOLECYSTECTOMY      COLONOSCOPY  12/02/2015    Dr. Britton, multiple polyps, recheck five years-three years if more than 2 polyps are adenomatous    COLONOSCOPY N/A 12/02/2015    COLONOSCOPY N/A 03/20/2019    Procedure: COLONOSCOPY;  Surgeon: Jose Britton MD;  Location: [facility];  Service: Endoscopy;  Laterality: N/A;    COLONOSCOPY N/A 05/20/2020    Dr. Britton; internal hemorrhoids; diverticulosis; polyps removed; repeat in 3 years    COLONOSCOPY N/A 11/16/2023    Procedure: COLONOSCOPY(instructions mailed 11/9);  Surgeon: Jose Britton MD;  Location: [facility];  Service: Endoscopy;  Laterality: N/A;    CYSTOSCOPY N/A 08/26/2020    Procedure: CYSTOSCOPY;  Surgeon: Charlotte Walters Jr., MD;  Location: [facility];  Service: Urology;  Laterality: N/A;    DILATION AND CURETTAGE OF UTERUS      DISSECTION OF NECK Left 09/13/2021    Procedure: DISSECTION, NECK;  Surgeon: Ryan Torres MD;  Location: [facility] 2ND FLR;  Service: ENT;  Laterality: Left;    EPIDURAL STEROID INJECTION INTO CERVICAL SPINE N/A 3/27/2024    Procedure: Injection-steroid-epidural-cervical;  Surgeon: Julián Chiang MD;  Location: [facility] OR;  Service: Anesthesiology;  Laterality: N/A;  c7-T1    EPIDURAL STEROID INJECTION INTO LUMBAR SPINE N/A 7/20/2023    Procedure: Injection-steroid-epidural-lumbar;  Surgeon: Julián Chiang MD;  Location: [facility] OR;  Service: Pain Management;  Laterality: N/A;  L5-s1    ESOPHAGEAL MANOMETRY WITH MEASUREMENT OF IMPEDANCE N/A 08/22/2022    Procedure: MANOMETRY, ESOPHAGUS, WITH IMPEDANCE MEASUREMENT;  Surgeon: Pantera Mcguire MD;  Location: [facility] (4TH FLR);  Service: Endoscopy;  Laterality: N/A;  8/4 fully vaccinated; instructions mailed-    ESOPHAGOGASTRODUODENOSCOPY N/A 05/20/2020      Tobi; small hiatal hernia; gastritis; 8 gastric polyps removed    ESOPHAGOGASTRODUODENOSCOPY N/A 04/01/2022    Procedure: EGD (ESOPHAGOGASTRODUODENOSCOPY);  Surgeon: Jose Britton MD;  Location: [facility];  Service: Endoscopy;  Laterality: N/A;    FOOT SURGERY      HYSTERECTOMY      TVH/BSO > 20 years    INCISION OF VULVA Bilateral 8/4/2023    Procedure: EXCISION, CYST, LABIA AND OTHER INDICATED PROCEDURES;  Surgeon: Mat Beach MD;  Location: [facility] OR;  Service: OB/GYN;  Laterality: Bilateral;  EXCYSION OF VULVAR CYST    INJECTION, SACROILIAC JOINT Right 12/1/2023    Procedure: INJECTION,SACROILIAC JOINT;  Surgeon: Julián Chiang MD;  Location: [facility] OR;  Service: Anesthesiology;  Laterality: Right;  sacroiliac injection    INTRALUMINAL GASTROINTESTINAL TRACT IMAGING VIA CAPSULE N/A 06/04/2020    Procedure: IMAGING PROCEDURE, GI TRACT, INTRALUMINAL, VIA CAPSULE;  Surgeon: Jose Britton MD;  Location: [facility];  Service: Endoscopy;  Laterality: N/A;    KNEE SURGERY  06/06/2013    right knee tear Dr Iverson     LAPAROSCOPIC CHOLECYSTECTOMY N/A 09/17/2020    Procedure: CHOLECYSTECTOMY, LAPAROSCOPIC;  Surgeon: Forrest Veronica MD;  [facility]: [facility];  Service: General;  Laterality: N/A;    LUNG LOBECTOMY  1966    right middle lobectomy, due to Tb    OOPHORECTOMY      SHOULDER SURGERY      francis     THYROIDECTOMY Bilateral 05/19/2021    Procedure: THYROIDECTOMY;  Surgeon: Jamia Amezquita MD;  [facility];  Service: General;  Laterality: Bilateral;    THYROIDECTOMY Bilateral 09/13/2021    Procedure: THYROIDECTOMY WITH INTRA-OP PTH;  Surgeon: Ryan Torres MD;  Location: [facility] OR 2ND FLR;  Service: ENT;  Laterality: Bilateral;  NIM tube  Intraop PTH        Current Outpatient Medications:     amLODIPine (NORVASC) 2.5 MG tablet, Take 1 tablet by mouth once daily, Disp: 30 tablet, Rfl: 6    artificial tears (ISOPTO TEARS) 0.5 % ophthalmic solution, 1 drop as needed., Disp: , Rfl:      blood sugar diagnostic (BLOOD GLUCOSE TEST) Strp, True Metrix glucose strips check once daily, Disp: 100 each, Rfl: 3    donepeziL (ARICEPT) 5 MG tablet, Take 2 tablets (10 mg total) by mouth every evening., Disp: 30 tablet, Rfl: 5    doxepin (SINEQUAN) 25 MG capsule, Take 1 capsule (25 mg total) by mouth every evening., Disp: 90 capsule, Rfl: 3    ergocalciferol (ERGOCALCIFEROL) 50,000 unit Cap, Take one capsule twice monthly., Disp: 6 capsule, Rfl: 3    fluticasone propionate (FLONASE) 50 mcg/actuation nasal spray, 1 spray (50 mcg total) by Each Nostril route once daily., Disp: 16 g, Rfl: 5    gabapentin (NEURONTIN) 100 MG capsule, TAKE 2 CAPSULES BY MOUTH IN THE EVENING, Disp: 60 capsule, Rfl: 0    ibuprofen (ADVIL,MOTRIN) 600 MG tablet, Take 1 tablet (600 mg total) by mouth every 6 (six) hours as needed for Pain., Disp: 30 tablet, Rfl: 0    levothyroxine (SYNTHROID) 88 MCG tablet, Take 1 tablet (88 mcg total) by mouth before breakfast., Disp: 30 tablet, Rfl: 11    loratadine (CLARITIN) 10 mg tablet, Take 1 tablet (10 mg total) by mouth once daily., Disp: 30 tablet, Rfl: 5    losartan (COZAAR) 100 MG tablet, Take 1 tablet (100 mg total) by mouth once daily., Disp: 90 tablet, Rfl: 3    rosuvastatin (CRESTOR) 10 MG tablet, Take 1 tablet (10 mg total) by mouth once daily., Disp: 90 tablet, Rfl: 4    thiamine 100 MG tablet, Take 1 tablet (100 mg total) by mouth once daily., Disp: 30 tablet, Rfl: 5    tiZANidine (ZANAFLEX) 2 MG tablet, Take 4 mg by mouth every evening., Disp: , Rfl:     valACYclovir (VALTREX) 500 MG tablet, Take one tablet (500mg) twice daily for three days for acute viral outbreak.  Start at the earliest sign of an outbreak, do not wait for visible rash (supply is enough for two outbreaks), Disp: 12 tablet, Rfl: 11    blood-glucose meter kit, True Metrix glucometer check glucose once daily, Disp: 1 each, Rfl: 0    cephALEXin (KEFLEX) 500 MG capsule, Take 1 capsule (500 mg total) by mouth 4 (four)  "times daily. for 7 days, Disp: 28 capsule, Rfl: 0    diazePAM (VALIUM) 2 MG tablet, Take 1 tablet (2 mg total) by mouth nightly as needed (muscle spasm)., Disp: 7 tablet, Rfl: 0  No current facility-administered medications for this visit.    Facility-Administered Medications Ordered in Other Visits:     lactated ringers infusion, , Intravenous, Continuous, Julián Chiang MD, Last Rate: 25 mL/hr at 03/27/24 1200, New Bag at 03/27/24 1200    LIDOcaine (PF) 10 mg/ml (1%) injection 10 mg, 1 mL, Intradermal, Once, Julián Chiang MD    Review of Systems   Constitutional:  Negative for chills and fever.   Respiratory:  Negative for cough and shortness of breath.    Cardiovascular:  Negative for chest pain and palpitations.   Gastrointestinal:  Negative for abdominal pain.   Skin:  Positive for color change.   Neurological:  Negative for dizziness and headaches.       Objective:   /60 (BP Location: Right arm, Patient Position: Sitting, BP Method: Medium (Manual))   Pulse 68   Temp 98 °F (36.7 °C) (Oral)   Ht 5' 6" (1.676 m)   Wt 75 kg (165 lb 5.5 oz)   SpO2 99%   BMI 26.69 kg/m²         Physical Exam  Constitutional:       General: She is not in acute distress.     Appearance: Normal appearance.   HENT:      Head: Atraumatic.   Cardiovascular:      Rate and Rhythm: Normal rate and regular rhythm.      Pulses: Normal pulses.      Heart sounds: Normal heart sounds.   Pulmonary:      Effort: Pulmonary effort is normal.      Breath sounds: Normal breath sounds.   Abdominal:      General: Abdomen is flat. Bowel sounds are normal.      Palpations: Abdomen is soft.   Skin:     Comments: Examined in the presence of chaperone (RICK Barakat)  + a tender raised skin lesion (5 mm x 5 mm in size) with surrounding erythema and warmth around right breast 3 o'clock position    Neurological:      Mental Status: She is oriented to person, place, and time.         Assessment:       1. Redness of skin    2. Skin lesion        Plan:     "   1. Skin lesion  - Ambulatory referral/consult to Dermatology; Future    2. Redness of skin  Likely cellulitis vs other pathology   - Will do US breast if no improvement   - US Breast Right Limited; Future  - cephALEXin (KEFLEX) 500 MG capsule; Take 1 capsule (500 mg total) by mouth 4 (four) times daily. for 7 days  Dispense: 28 capsule; Refill: 0   - Advised to go to ED if symptoms worsen including marked redness, worsening breast pain, fever, chills, generalized body ache or any abnormal worsening symptoms    Patient with be reevaluated in  follow up with pcp  or sooner chuy Camargo NP

## 2024-06-10 NOTE — TELEPHONE ENCOUNTER
Spoke to pharmacy and clarified augmentin was cancelled and Keflex is current treatment. Pharmacist verbalizes understanding and will discuss with pt as well.

## 2024-06-10 NOTE — TELEPHONE ENCOUNTER
----- Message from Janny Hoff sent at 6/10/2024  7:25 AM CDT -----  Type:  Same Day Appointment Request    Caller is requesting a same day appointment.  Caller declined first available appointment listed below.      Name of Caller:  pt  When is the first available appointment?  none  Symptoms:  shoulder and neck pain  Best Call Back Number:  [phone]  Additional Information:   please call and advise--thank you

## 2024-06-12 ENCOUNTER — HOSPITAL ENCOUNTER (OUTPATIENT)
Dept: RADIOLOGY | Facility: HOSPITAL | Age: 79
Discharge: HOME | End: 2024-06-12
Attending: PHYSICIAN ASSISTANT
Payer: MEDICARE

## 2024-06-12 ENCOUNTER — TELEPHONE (OUTPATIENT)
Dept: FAMILY MEDICINE | Facility: CLINIC | Age: 79
End: 2024-06-12
Payer: MEDICARE

## 2024-06-12 DIAGNOSIS — E89.0 POSTOPERATIVE HYPOTHYROIDISM: ICD-10-CM

## 2024-06-12 PROCEDURE — 76536 US EXAM OF HEAD AND NECK: CPT | Mod: TC

## 2024-06-12 PROCEDURE — 76536 US EXAM OF HEAD AND NECK: CPT | Mod: 26,,, | Performed by: RADIOLOGY

## 2024-06-12 NOTE — TELEPHONE ENCOUNTER
----- Message from Rei Painter sent at 6/12/2024  7:37 AM CDT -----  Regarding: Sooner Appt  Type:  Sooner Appointment Request    Caller is requesting a sooner appointment.  Caller declined first available appointment listed below.  Caller will not accept being placed on the waitlist and is requesting a message be sent to doctor.    Name of Caller:Pt    When is the first available appointment?none in time frame    Symptoms:check up    Would the patient rather a call back or a response via [facility]? Call back    Best Call Back Number:[phone]  or [phone] (mobile)        Additional Information: Sts she would like to see Dr Myers as she hasn't in a while and would like it after her test today  in a few days or next week but doesn't want to change her appt next month and wants to see him only since she hasn't in a while.   Please advise -- Thank you

## 2024-06-12 NOTE — TELEPHONE ENCOUNTER
Patient wants to see Dr. Myers to go over results of testing she is having for lab and breast ultrasound- appt made 6/20/24.

## 2024-06-17 ENCOUNTER — TELEPHONE (OUTPATIENT)
Dept: PAIN MEDICINE | Facility: CLINIC | Age: 79
End: 2024-06-17
Payer: MEDICARE

## 2024-06-17 ENCOUNTER — OFFICE VISIT (OUTPATIENT)
Dept: SPINE | Facility: CLINIC | Age: 79
End: 2024-06-17
Payer: MEDICARE

## 2024-06-17 VITALS — HEIGHT: 66 IN | BODY MASS INDEX: 26.58 KG/M2 | WEIGHT: 165.38 LBS

## 2024-06-17 DIAGNOSIS — M54.2 CERVICALGIA: Primary | ICD-10-CM

## 2024-06-17 PROCEDURE — 99999 PR PBB SHADOW E&M-EST. PATIENT-LVL III: CPT | Mod: PBBFAC,,, | Performed by: PHYSICAL MEDICINE & REHABILITATION

## 2024-06-17 PROCEDURE — 96372 THER/PROPH/DIAG INJ SC/IM: CPT | Mod: S$GLB,,, | Performed by: PHYSICAL MEDICINE & REHABILITATION

## 2024-06-17 PROCEDURE — 1159F MED LIST DOCD IN RCRD: CPT | Mod: CPTII,S$GLB,, | Performed by: PHYSICAL MEDICINE & REHABILITATION

## 2024-06-17 PROCEDURE — 1125F AMNT PAIN NOTED PAIN PRSNT: CPT | Mod: CPTII,S$GLB,, | Performed by: PHYSICAL MEDICINE & REHABILITATION

## 2024-06-17 PROCEDURE — 3288F FALL RISK ASSESSMENT DOCD: CPT | Mod: CPTII,S$GLB,, | Performed by: PHYSICAL MEDICINE & REHABILITATION

## 2024-06-17 PROCEDURE — 99213 OFFICE O/P EST LOW 20 MIN: CPT | Mod: 25,S$GLB,, | Performed by: PHYSICAL MEDICINE & REHABILITATION

## 2024-06-17 PROCEDURE — 1160F RVW MEDS BY RX/DR IN RCRD: CPT | Mod: CPTII,S$GLB,, | Performed by: PHYSICAL MEDICINE & REHABILITATION

## 2024-06-17 PROCEDURE — 1101F PT FALLS ASSESS-DOCD LE1/YR: CPT | Mod: CPTII,S$GLB,, | Performed by: PHYSICAL MEDICINE & REHABILITATION

## 2024-06-17 PROCEDURE — 3072F LOW RISK FOR RETINOPATHY: CPT | Mod: CPTII,S$GLB,, | Performed by: PHYSICAL MEDICINE & REHABILITATION

## 2024-06-17 RX ORDER — METHYLPREDNISOLONE ACETATE 40 MG/ML
40 INJECTION, SUSPENSION INTRA-ARTICULAR; INTRALESIONAL; INTRAMUSCULAR; SOFT TISSUE
Status: COMPLETED | OUTPATIENT
Start: 2024-06-17 | End: 2024-06-17

## 2024-06-17 RX ORDER — METHYLPREDNISOLONE 4 MG/1
TABLET ORAL
Qty: 1 EACH | Refills: 0 | Status: SHIPPED | OUTPATIENT
Start: 2024-06-17 | End: 2024-06-20

## 2024-06-17 RX ORDER — KETOROLAC TROMETHAMINE 30 MG/ML
30 INJECTION, SOLUTION INTRAMUSCULAR; INTRAVENOUS ONCE
Status: COMPLETED | OUTPATIENT
Start: 2024-06-17 | End: 2024-06-17

## 2024-06-17 RX ORDER — TIZANIDINE 2 MG/1
4 TABLET ORAL EVERY 8 HOURS PRN
Qty: 30 TABLET | Refills: 0 | Status: SHIPPED | OUTPATIENT
Start: 2024-06-17

## 2024-06-17 RX ADMIN — KETOROLAC TROMETHAMINE 30 MG: 30 INJECTION, SOLUTION INTRAMUSCULAR; INTRAVENOUS at 10:06

## 2024-06-17 RX ADMIN — METHYLPREDNISOLONE ACETATE 40 MG: 40 INJECTION, SUSPENSION INTRA-ARTICULAR; INTRALESIONAL; INTRAMUSCULAR; SOFT TISSUE at 10:06

## 2024-06-17 NOTE — TELEPHONE ENCOUNTER
I sent a Medrol Dosepak to her pharmacy that she can start tomorrow if her neck pain does not resolve by then Principal Discharge DX:	Pneumonia

## 2024-06-17 NOTE — TELEPHONE ENCOUNTER
Dr Cortes, were you also going to give pt oral medication or just the injections given in clinic today? Thank you

## 2024-06-17 NOTE — TELEPHONE ENCOUNTER
----- Message from Narayan Duong sent at 6/17/2024  2:13 PM CDT -----  Contact: Self  Type: Needs Medical Advice  Who Called:  Patient    Pharmacy name and phone #:    [id number] - [place], [place] - [address]  [place] [address]  Phone: [phone] Fax: [phone]    Best Call Back Number: [phone]  Additional Information: Patient has some questions about if an rx was called in for her this morning.

## 2024-06-18 LAB
OHS QRS DURATION: 76 MS
OHS QTC CALCULATION: 447 MS

## 2024-06-19 ENCOUNTER — TELEPHONE (OUTPATIENT)
Dept: FAMILY MEDICINE | Facility: CLINIC | Age: 79
End: 2024-06-19
Payer: MEDICARE

## 2024-06-19 NOTE — TELEPHONE ENCOUNTER
----- Message from Narciso Fontana sent at 6/19/2024  7:43 AM CDT -----  Contact: self  Type: Sooner Appointment Request        Caller is requesting a sooner appointment. Caller declined first available appointment listed below. Caller will not accept being placed on the waitlist and is requesting a message be sent to doctor.        Name of Caller: patient   Best Call Back Number: [phone]  Additional Information: Plz call the pt to see if she needs to do her appt on 7/18/24 for her medication. Thanks

## 2024-06-20 ENCOUNTER — HOSPITAL ENCOUNTER (OUTPATIENT)
Dept: RADIOLOGY | Facility: HOSPITAL | Age: 79
Discharge: HOME | End: 2024-06-20
Attending: FAMILY MEDICINE
Payer: MEDICARE

## 2024-06-20 ENCOUNTER — OFFICE VISIT (OUTPATIENT)
Dept: FAMILY MEDICINE | Facility: CLINIC | Age: 79
End: 2024-06-20
Attending: FAMILY MEDICINE
Payer: MEDICARE

## 2024-06-20 VITALS
BODY MASS INDEX: 26.7 KG/M2 | OXYGEN SATURATION: 99 % | HEIGHT: 66 IN | SYSTOLIC BLOOD PRESSURE: 108 MMHG | DIASTOLIC BLOOD PRESSURE: 62 MMHG | HEART RATE: 62 BPM | TEMPERATURE: 99 F | WEIGHT: 166.13 LBS

## 2024-06-20 DIAGNOSIS — M17.11 PRIMARY OSTEOARTHRITIS OF RIGHT KNEE: ICD-10-CM

## 2024-06-20 DIAGNOSIS — M17.11 PRIMARY OSTEOARTHRITIS OF RIGHT KNEE: Primary | ICD-10-CM

## 2024-06-20 DIAGNOSIS — M54.2 NECK PAIN: ICD-10-CM

## 2024-06-20 DIAGNOSIS — N61.1 ABSCESS OF RIGHT BREAST UNRELATED TO PREGNANCY OR BREASTFEEDING: ICD-10-CM

## 2024-06-20 PROCEDURE — 1125F AMNT PAIN NOTED PAIN PRSNT: CPT | Mod: CPTII,S$GLB,, | Performed by: FAMILY MEDICINE

## 2024-06-20 PROCEDURE — 3072F LOW RISK FOR RETINOPATHY: CPT | Mod: CPTII,S$GLB,, | Performed by: FAMILY MEDICINE

## 2024-06-20 PROCEDURE — 3288F FALL RISK ASSESSMENT DOCD: CPT | Mod: CPTII,S$GLB,, | Performed by: FAMILY MEDICINE

## 2024-06-20 PROCEDURE — 99214 OFFICE O/P EST MOD 30 MIN: CPT | Mod: S$GLB,,, | Performed by: FAMILY MEDICINE

## 2024-06-20 PROCEDURE — 99999 PR PBB SHADOW E&M-EST. PATIENT-LVL V: CPT | Mod: PBBFAC,,, | Performed by: FAMILY MEDICINE

## 2024-06-20 PROCEDURE — 1160F RVW MEDS BY RX/DR IN RCRD: CPT | Mod: CPTII,S$GLB,, | Performed by: FAMILY MEDICINE

## 2024-06-20 PROCEDURE — 73562 X-RAY EXAM OF KNEE 3: CPT | Mod: TC,RT

## 2024-06-20 PROCEDURE — 3078F DIAST BP <80 MM HG: CPT | Mod: CPTII,S$GLB,, | Performed by: FAMILY MEDICINE

## 2024-06-20 PROCEDURE — 73562 X-RAY EXAM OF KNEE 3: CPT | Mod: 26,RT,, | Performed by: RADIOLOGY

## 2024-06-20 PROCEDURE — 3074F SYST BP LT 130 MM HG: CPT | Mod: CPTII,S$GLB,, | Performed by: FAMILY MEDICINE

## 2024-06-20 PROCEDURE — 1101F PT FALLS ASSESS-DOCD LE1/YR: CPT | Mod: CPTII,S$GLB,, | Performed by: FAMILY MEDICINE

## 2024-06-20 PROCEDURE — 1159F MED LIST DOCD IN RCRD: CPT | Mod: CPTII,S$GLB,, | Performed by: FAMILY MEDICINE

## 2024-06-20 NOTE — PROGRESS NOTES
Subjective:       Patient ID: Erica Masterson is a 78 y.o. female.    Chief Complaint: Follow-up    78-year-old female coming in with several concerns.  She has a small lesion on the medial side of the right breast just be low nipple level that opened and drained a few days ago and now is resolving.  She has not had any fever chills or night sweats.  She has not been applying any warm compresses.    She was seen recently in the back and spine clinic for posterior neck pain felt secondary to her known degenerative disc disease and was given a Decadron shot followed by a Medrol Dosepak and some tizanidine.  It is improved, she is just completing the Medrol Dosepak in his asking if she needs another one.  Her pain is improved and she has discontinued taking the gabapentin.  She was using gabapentin originally for pain in the feet.  She tells me that she has gone to a larger size shoe and the foot pain has improved to the point that she does not feel she needs the gabapentin for that either.    She is having discomfort in her right knee which is somewhat angulated with a valgus deformity.  She has x-rays of the knee taken in October of 2023 showing only mild degenerative changes without the valgus deformity, this really does not look like the same knee.  She denies any fall or injury.  She is not taking anything for pain, she can not take oral anti-inflammatories due to her renal dysfunction and she has not tried Voltaren gel.  She also has not tried Tylenol p.o.    She has a history controlled type 2 diabetes also followed by Malissa Robbins in Endocrinology, hypertension, hyperlipidemia, reflux, thyroid cancer with metastases under the care of Oncology, stage IIIA chronic kidney disease, and osteoporosis.    Past Medical History:  3/26/2012: Adenomatous polyp of colon  No date: Allergy  No date: Anxiety  No date: Cancer  No date: Cataract  No date: Colon polyp  04/03/2012: Degenerative arthritis of knee  No date:  Diverticulosis  No date: Encounter for blood transfusion  No date: GERD (gastroesophageal reflux disease)  2021: History of thyroid cancer  No date: Hyperlipidemia  No date: Hypertension  5/20/2013: Lateral meniscal tear  03/26/2012: Multinodular goiter  01/18/2010: Myopathy, unspecified  No date: Tuberculosis    Past Surgical History:  2015: BREAST BIOPSY; Left      Comment:  benign  No date: CHOLECYSTECTOMY  12/02/2015: COLONOSCOPY      Comment:  Dr. Britton, multiple polyps, recheck five years-three                years if more than 2 polyps are adenomatous  12/02/2015: COLONOSCOPY; N/A  03/20/2019: COLONOSCOPY; N/A      Comment:  Procedure: COLONOSCOPY;  Surgeon: Jose Britton MD;                Location: [facility];  Service: Endoscopy;  Laterality:                N/A;  05/20/2020: COLONOSCOPY; N/A      Comment:  Dr. Britton; internal hemorrhoids; diverticulosis;                polyps removed; repeat in 3 years  11/16/2023: COLONOSCOPY; N/A      Comment:  Procedure: COLONOSCOPY(instructions mailed 11/9);                 Surgeon: Jose Britton MD;  Location: [facility];                 Service: Endoscopy;  Laterality: N/A;  08/26/2020: CYSTOSCOPY; N/A      Comment:  Procedure: CYSTOSCOPY;  Surgeon: Charlotte Walters Jr., MD;               Location: [facility] OR;  Service: Urology;  Laterality: N/A;  No date: DILATION AND CURETTAGE OF UTERUS  09/13/2021: DISSECTION OF NECK; Left      Comment:  Procedure: DISSECTION, NECK;  Surgeon: Ryan Torres MD;  Location: [address];  Service: ENT;                 Laterality: Left;  3/27/2024: EPIDURAL STEROID INJECTION INTO CERVICAL SPINE; N/A      Comment:  Procedure: Injection-steroid-epidural-cervical;                 Surgeon: Julián Chiang MD;  Location: [facility];                 Service: Anesthesiology;  Laterality: N/A;  c7-T1  7/20/2023: EPIDURAL STEROID INJECTION INTO LUMBAR SPINE; N/A      Comment:  Procedure:  Injection-steroid-epidural-lumbar;  Surgeon:                Julián Chiang MD;  [facility];  Service: Pain                Management;  Laterality: N/A;  L5-s1  08/22/2022: ESOPHAGEAL MANOMETRY WITH MEASUREMENT OF IMPEDANCE; N/A      Comment:  Procedure: MANOMETRY, ESOPHAGUS, WITH IMPEDANCE                MEASUREMENT;  Surgeon: Pantera Mcguire MD;  Location: [facility]                ENDO (4TH FLR);  Service: Endoscopy;  Laterality: N/A;                 8/4 fully vaccinated; instructions mailed-st  05/20/2020: ESOPHAGOGASTRODUODENOSCOPY; N/A      Comment:  Dr. Britton; small hiatal hernia; gastritis; 8 gastric                polyps removed  04/01/2022: ESOPHAGOGASTRODUODENOSCOPY; N/A      Comment:  Procedure: EGD (ESOPHAGOGASTRODUODENOSCOPY);  Surgeon:                Jose Britton MD;  Location: [facility];  Service:                Endoscopy;  Laterality: N/A;  No date: FOOT SURGERY  No date: HYSTERECTOMY      Comment:  TVH/BSO > 20 years  8/4/2023: INCISION OF VULVA; Bilateral      Comment:  Procedure: EXCISION, CYST, LABIA AND OTHER INDICATED                PROCEDURES;  Surgeon: Mat Beach MD;  Location:                [facility] OR;  Service: OB/GYN;  Laterality: Bilateral;                 EXCYSION OF VULVAR CYST  12/1/2023: INJECTION, SACROILIAC JOINT; Right      Comment:  Procedure: INJECTION,SACROILIAC JOINT;  Surgeon: Julián Chiang MD;  Location: [facility] OR;  Service:                Anesthesiology;  Laterality: Right;  sacroiliac injection  06/04/2020: INTRALUMINAL GASTROINTESTINAL TRACT IMAGING VIA CAPSULE;   N/A      Comment:  Procedure: IMAGING PROCEDURE, GI TRACT, INTRALUMINAL,                VIA CAPSULE;  Surgeon: Jose Britton MD;  Location:                [facility];  Service: Endoscopy;  Laterality: N/A;  06/06/2013: KNEE SURGERY      Comment:  right knee tear Dr Iverosn   09/17/2020: LAPAROSCOPIC CHOLECYSTECTOMY; N/A      Comment:  Procedure: CHOLECYSTECTOMY, LAPAROSCOPIC;  Surgeon:                 Forrest Veronica MD;  Location: [facility] OR;  Service:                General;  Laterality: N/A;  1966: LUNG LOBECTOMY      Comment:  right middle lobectomy, due to Tb  No date: OOPHORECTOMY  No date: SHOULDER SURGERY      Comment:  francis   05/19/2021: THYROIDECTOMY; Bilateral      Comment:  Procedure: THYROIDECTOMY;  Surgeon: Jamia Amezquita MD;  [facility];  Service: General;                 Laterality: Bilateral;  09/13/2021: THYROIDECTOMY; Bilateral      Comment:  Procedure: THYROIDECTOMY WITH INTRA-OP PTH;  Surgeon:                Ryan Torres MD;  [facility];  Service:               ENT;  Laterality: Bilateral;  NIM tube  Intraop PTH    Current Outpatient Medications on File Prior to Visit:  amLODIPine (NORVASC) 2.5 MG tablet, Take 1 tablet by mouth once daily, Disp: 30 tablet, Rfl: 6  artificial tears (ISOPTO TEARS) 0.5 % ophthalmic solution, 1 drop as needed., Disp: , Rfl:   blood sugar diagnostic (BLOOD GLUCOSE TEST) Strp, True Metrix glucose strips check once daily, Disp: 100 each, Rfl: 3  donepeziL (ARICEPT) 5 MG tablet, Take 2 tablets (10 mg total) by mouth every evening., Disp: 30 tablet, Rfl: 5  doxepin (SINEQUAN) 25 MG capsule, Take 1 capsule (25 mg total) by mouth every evening., Disp: 90 capsule, Rfl: 3  ergocalciferol (ERGOCALCIFEROL) 50,000 unit Cap, Take one capsule twice monthly., Disp: 6 capsule, Rfl: 3  fluticasone propionate (FLONASE) 50 mcg/actuation nasal spray, 1 spray (50 mcg total) by Each Nostril route once daily., Disp: 16 g, Rfl: 5  ibuprofen (ADVIL,MOTRIN) 600 MG tablet, Take 1 tablet (600 mg total) by mouth every 6 (six) hours as needed for Pain., Disp: 30 tablet, Rfl: 0  levothyroxine (SYNTHROID) 88 MCG tablet, Take 1 tablet (88 mcg total) by mouth before breakfast., Disp: 30 tablet, Rfl: 11  loratadine (CLARITIN) 10 mg tablet, Take 1 tablet (10 mg total) by mouth once daily., Disp: 30 tablet, Rfl: 5  losartan (COZAAR)  100 MG tablet, Take 1 tablet (100 mg total) by mouth once daily., Disp: 90 tablet, Rfl: 3  rosuvastatin (CRESTOR) 10 MG tablet, Take 1 tablet (10 mg total) by mouth once daily., Disp: 90 tablet, Rfl: 4  thiamine 100 MG tablet, Take 1 tablet (100 mg total) by mouth once daily., Disp: 30 tablet, Rfl: 5  tiZANidine (ZANAFLEX) 2 MG tablet, Take 2 tablets (4 mg total) by mouth every 8 (eight) hours as needed (Pain/spasm)., Disp: 30 tablet, Rfl: 0  blood-glucose meter kit, True Metrix glucometer check glucose once daily, Disp: 1 each, Rfl: 0  diazePAM (VALIUM) 2 MG tablet, Take 1 tablet (2 mg total) by mouth nightly as needed (muscle spasm)., Disp: 7 tablet, Rfl: 0  gabapentin (NEURONTIN) 100 MG capsule, TAKE 2 CAPSULES BY MOUTH IN THE EVENING (Patient not taking: Reported on 6/20/2024), Disp: 60 capsule, Rfl: 0  methylPREDNISolone (MEDROL DOSEPACK) 4 mg tablet, use as directed, Disp: 1 each, Rfl: 0  valACYclovir (VALTREX) 500 MG tablet, Take one tablet (500mg) twice daily for three days for acute viral outbreak.  Start at the earliest sign of an outbreak, do not wait for visible rash (supply is enough for two outbreaks) (Patient not taking: Reported on 6/20/2024), Disp: 12 tablet, Rfl: 11    Current Facility-Administered Medications on File Prior to Visit:  lactated ringers infusion, , Intravenous, Continuous, Julián Chiang MD, Last Rate: 25 mL/hr at 03/27/24 1200, New Bag at 03/27/24 1200  LIDOcaine (PF) 10 mg/ml (1%) injection 10 mg, 1 mL, Intradermal, Once, Julián Chiang MD            Review of Systems   Constitutional:  Negative for chills, diaphoresis and fever.   Respiratory:  Negative for chest tightness and shortness of breath.    Musculoskeletal:  Positive for arthralgias and neck pain.   Skin:  Positive for wound.       Objective:      Physical Exam  Vitals and nursing note reviewed.   Constitutional:       Comments: Good blood pressure control   Normal pulse with regular rhythm   Normal weight for age with a BMI  of 26.8 she is up 2 lb from her March 22, 2024 physical   Chest:       Musculoskeletal:      Cervical back: No swelling, deformity, spasms, tenderness or bony tenderness.      Right knee: Swelling, deformity and crepitus (Prominent clunk as she extended her knee that could not be reproduced) present. No erythema or bony tenderness. No tenderness. Abnormal alignment (Valgus deformity with mild to moderate angulation of the distal right knee).      Left knee: Normal.         Assessment:       1. Primary osteoarthritis of right knee    2. Abscess of right breast unrelated to pregnancy or breastfeeding    3. Neck pain        Plan:       1. Primary osteoarthritis of right knee  Try some Voltaren gel over the right knee.  I want to recheck an x-ray, this appears to have degenerated significantly since the previous right knee x-ray was done October 2023 without any history of trauma  - X-Ray Knee 3 View Right; Future    2. Abscess of right breast unrelated to pregnancy or breastfeeding  Spontaneously drained.  Healing.  Recommend use warm compresses 3 to 4 times daily she has an upcoming diagnostic mammogram with ultrasound on the left side to follow-up from a previous abnormality    3. Neck pain  Appears to have resolved with the Decadron injection, Medrol pack, and muscle relaxer.  She does not appear to need another Medrol pack which would not be good for her blood sugar control

## 2024-06-22 DIAGNOSIS — R41.3 MEMORY IMPAIRMENT: ICD-10-CM

## 2024-06-22 DIAGNOSIS — I10 HYPERTENSION, ESSENTIAL: ICD-10-CM

## 2024-06-24 ENCOUNTER — TELEPHONE (OUTPATIENT)
Dept: SPINE | Facility: CLINIC | Age: 79
End: 2024-06-24
Payer: MEDICARE

## 2024-06-24 RX ORDER — DONEPEZIL HYDROCHLORIDE 5 MG/1
5 TABLET, FILM COATED ORAL NIGHTLY
Qty: 30 TABLET | Refills: 11 | OUTPATIENT
Start: 2024-06-24 | End: 2025-06-24

## 2024-06-24 RX ORDER — LOSARTAN POTASSIUM 100 MG/1
100 TABLET ORAL
Qty: 90 TABLET | Refills: 0 | Status: SHIPPED | OUTPATIENT
Start: 2024-06-24

## 2024-06-24 RX ORDER — DONEPEZIL HYDROCHLORIDE 10 MG/1
10 TABLET, FILM COATED ORAL NIGHTLY
Qty: 30 TABLET | Refills: 1 | Status: SHIPPED | OUTPATIENT
Start: 2024-06-24 | End: 2025-06-24

## 2024-06-24 NOTE — TELEPHONE ENCOUNTER
----- Message from Dustin Cortes MD sent at 6/24/2024  7:25 AM CDT -----  Please check up on her and see how she is doing

## 2024-06-25 ENCOUNTER — TELEPHONE (OUTPATIENT)
Dept: FAMILY MEDICINE | Facility: CLINIC | Age: 79
End: 2024-06-25
Payer: MEDICARE

## 2024-06-25 DIAGNOSIS — M25.561 ACUTE PAIN OF RIGHT KNEE: Primary | ICD-10-CM

## 2024-06-25 NOTE — TELEPHONE ENCOUNTER
Patient given xray results. Referral to ortho generated and dept will call patient to schedule.

## 2024-06-25 NOTE — TELEPHONE ENCOUNTER
----- Message from Narayan Myers MD sent at 6/20/2024  6:17 PM CDT -----  The right knee looks pretty bad on x-ray with nearly bone-on-bone wear.  I would suggest an orthopedic consult.

## 2024-06-28 ENCOUNTER — OFFICE VISIT (OUTPATIENT)
Dept: ORTHOPEDICS | Facility: CLINIC | Age: 79
End: 2024-06-28
Attending: FAMILY MEDICINE
Payer: MEDICARE

## 2024-06-28 VITALS — OXYGEN SATURATION: 99 % | WEIGHT: 166.25 LBS | HEIGHT: 66 IN | BODY MASS INDEX: 26.72 KG/M2 | HEART RATE: 71 BPM

## 2024-06-28 DIAGNOSIS — M17.11 PRIMARY OSTEOARTHRITIS OF RIGHT KNEE: Primary | ICD-10-CM

## 2024-06-28 DIAGNOSIS — M25.561 ACUTE PAIN OF RIGHT KNEE: ICD-10-CM

## 2024-06-28 PROCEDURE — 99999 PR PBB SHADOW E&M-EST. PATIENT-LVL IV: CPT | Mod: PBBFAC,,, | Performed by: ORTHOPAEDIC SURGERY

## 2024-06-28 RX ORDER — LEVOTHYROXINE SODIUM 75 UG/1
TABLET ORAL
COMMUNITY
Start: 2024-05-11

## 2024-06-28 RX ORDER — TRIAMCINOLONE ACETONIDE 40 MG/ML
40 INJECTION, SUSPENSION INTRA-ARTICULAR; INTRAMUSCULAR
Status: DISCONTINUED | OUTPATIENT
Start: 2024-06-28 | End: 2024-06-28 | Stop reason: HOSPADM

## 2024-06-28 RX ADMIN — TRIAMCINOLONE ACETONIDE 40 MG: 40 INJECTION, SUSPENSION INTRA-ARTICULAR; INTRAMUSCULAR at 02:06

## 2024-06-28 NOTE — PROCEDURES
Large Joint Aspiration/Injection: R knee    Date/Time: 6/28/2024 2:00 PM    Performed by: Jacinto Palm MD  Authorized by: Jacinto Palm MD    Consent Done?:  Yes (Verbal)  Indications:  Pain  Site marked: the procedure site was marked    Timeout: prior to procedure the correct patient, procedure, and site was verified    Local anesthetic: Ropivicaine.  Anesthetic total (ml):  3      Details:  Needle Size:  20 G  Approach:  Anterolateral  Location:  Knee  Site:  R knee  Medications:  40 mg triamcinolone acetonide 40 mg/mL  Patient tolerance:  Patient tolerated the procedure well with no immediate complications

## 2024-06-28 NOTE — PROGRESS NOTES
Subjective:      Patient ID: Erica Masterson is a 78 y.o. female.    Chief Complaint: Pain of the Right Knee    HPI  78-year-old female long history of intermittent problems with the right knee.  It has been acute more recently.  There has been no recent trauma.  She is complaining of some subtle deformity of the knee she is complaining of pain with prolonged standing walking.  ROS      Objective:    Ortho Exam     Constitutional:   Patient is alert  and oriented in no acute distress  HEENT:  normocephalic atraumatic; PERRL EOMI  Neck:  Supple without adenopathy  Cardiovascular:  Normal rate and rhythm  Pulmonary:  Normal respiratory effort normal chest wall expansion  Abdominal:  Nonprotuberant nondistended  Musculoskeletal:  Patient has a mildly antalgic gait a valgus knee  Diffuse primarily lateral joint line tenderness and crepitus with range of motion maybe a trace effusion  No gross instability  Neurological:  No focal defect; cranial nerves 2-12 grossly intact  Psychiatric/behavioral:  Mood and behavior normal      X-Ray Knee 3 View Right  Narrative: EXAMINATION:  XR KNEE 3 VIEW RIGHT    CLINICAL HISTORY:  Unilateral primary osteoarthritis, right knee    TECHNIQUE:  AP, lateral, and Merchant views of the right knee were performed.    COMPARISON:  10/31/2023    FINDINGS:  Severe degenerative change involving the lateral compartment of the knee.  Small anterior patellar spur at site of insertion of the distal quadriceps tendon tiny posterior patellar spurs    No acute fracture or dislocation  Impression: Severe degenerative change lateral compartment of the right knee with degenerative changes elsewhere appearing more minimal.    Electronically signed by: Aundrea Rodas MD  Date:    06/20/2024  Time:    18:10       My Radiographs Findings:    I have personally reviewed radiographs and concur with above findings    Assessment:       Encounter Diagnoses   Name Primary?    Acute pain of right knee     Primary  osteoarthritis of right knee Yes         Plan:       I have discussed medical condition treatment options with her at length.  She would like to consider more conservative approach.  We have discussed possibility of knee replacement in the future symptoms fail to improve.  We have discussed NSAIDs if approved by her PCP.  After a verbal consent and sterile prep I injected her right knee today without complication.  Follow up in 6 weeks if symptoms fail to improve we could consider viscosupplementation prior to knee replacement if she desires.        Past Medical History:   Diagnosis Date    Adenomatous polyp of colon 3/26/2012    Allergy     Anxiety     Cancer     Cataract     Colon polyp     Degenerative arthritis of knee 04/03/2012    Diverticulosis     Encounter for blood transfusion     GERD (gastroesophageal reflux disease)     History of thyroid cancer 2021    Hyperlipidemia     Hypertension     Lateral meniscal tear 5/20/2013    Multinodular goiter 03/26/2012    Myopathy, unspecified 01/18/2010    Tuberculosis      Past Surgical History:   Procedure Laterality Date    BREAST BIOPSY Left 2015    benign    CHOLECYSTECTOMY      COLONOSCOPY  12/02/2015    Dr. Britton, multiple polyps, recheck five years-three years if more than 2 polyps are adenomatous    COLONOSCOPY N/A 12/02/2015    COLONOSCOPY N/A 03/20/2019    Procedure: COLONOSCOPY;  Surgeon: Jose Britton MD;  Location: [facility];  Service: Endoscopy;  Laterality: N/A;    COLONOSCOPY N/A 05/20/2020    Dr. Britton; internal hemorrhoids; diverticulosis; polyps removed; repeat in 3 years    COLONOSCOPY N/A 11/16/2023    Procedure: COLONOSCOPY(instructions mailed 11/9);  Surgeon: Jose Britton MD;  Location: [facility];  Service: Endoscopy;  Laterality: N/A;    CYSTOSCOPY N/A 08/26/2020    Procedure: CYSTOSCOPY;  Surgeon: Charlotte Walters Jr., MD;  Location: [facility];  Service: Urology;  Laterality: N/A;    DILATION AND CURETTAGE OF UTERUS      DISSECTION  OF NECK Left 09/13/2021    Procedure: DISSECTION, NECK;  Surgeon: Ryan Torres MD;  Location: [facility] 2ND FLR;  Service: ENT;  Laterality: Left;    EPIDURAL STEROID INJECTION INTO CERVICAL SPINE N/A 3/27/2024    Procedure: Injection-steroid-epidural-cervical;  Surgeon: Julián Chiang MD;  [facility];  Service: Anesthesiology;  Laterality: N/A;  c7-T1    EPIDURAL STEROID INJECTION INTO LUMBAR SPINE N/A 7/20/2023    Procedure: Injection-steroid-epidural-lumbar;  Surgeon: Julián Chiang MD;  Location: [facility] OR;  Service: Pain Management;  Laterality: N/A;  L5-s1    ESOPHAGEAL MANOMETRY WITH MEASUREMENT OF IMPEDANCE N/A 08/22/2022    Procedure: MANOMETRY, ESOPHAGUS, WITH IMPEDANCE MEASUREMENT;  Surgeon: Pantera Mcguire MD;  Location: [facility] (4TH FLR);  Service: Endoscopy;  Laterality: N/A;  8/4 fully vaccinated; instructions mailed-st    ESOPHAGOGASTRODUODENOSCOPY N/A 05/20/2020    Dr. Britton; small hiatal hernia; gastritis; 8 gastric polyps removed    ESOPHAGOGASTRODUODENOSCOPY N/A 04/01/2022    Procedure: EGD (ESOPHAGOGASTRODUODENOSCOPY);  Surgeon: Jose Britton MD;  Location: [facility];  Service: Endoscopy;  Laterality: N/A;    FOOT SURGERY      HYSTERECTOMY      TVH/BSO > 20 years    INCISION OF VULVA Bilateral 8/4/2023    Procedure: EXCISION, CYST, LABIA AND OTHER INDICATED PROCEDURES;  Surgeon: Mat Beach MD;  Location: [facility] OR;  Service: OB/GYN;  Laterality: Bilateral;  EXCYSION OF VULVAR CYST    INJECTION, SACROILIAC JOINT Right 12/1/2023    Procedure: INJECTION,SACROILIAC JOINT;  Surgeon: Julián Chiang MD;  Location: [facility] OR;  Service: Anesthesiology;  Laterality: Right;  sacroiliac injection    INTRALUMINAL GASTROINTESTINAL TRACT IMAGING VIA CAPSULE N/A 06/04/2020    Procedure: IMAGING PROCEDURE, GI TRACT, INTRALUMINAL, VIA CAPSULE;  Surgeon: Jose Britton MD;  Location: [facility] ENDO;  Service: Endoscopy;  Laterality: N/A;    KNEE SURGERY  06/06/2013    right knee tear Dr Iverson      LAPAROSCOPIC CHOLECYSTECTOMY N/A 09/17/2020    Procedure: CHOLECYSTECTOMY, LAPAROSCOPIC;  Surgeon: Forrest Veronica MD;  Location: [facility] OR;  Service: General;  Laterality: N/A;    LUNG LOBECTOMY  1966    right middle lobectomy, due to Tb    OOPHORECTOMY      SHOULDER SURGERY      francis     THYROIDECTOMY Bilateral 05/19/2021    Procedure: THYROIDECTOMY;  Surgeon: Jamia Amezquita MD;  [facility];  Service: General;  Laterality: Bilateral;    THYROIDECTOMY Bilateral 09/13/2021    Procedure: THYROIDECTOMY WITH INTRA-OP PTH;  Surgeon: Ryan Torres MD;  [facility];  Service: ENT;  Laterality: Bilateral;  NIM tube  Intraop PTH         Current Outpatient Medications:     amLODIPine (NORVASC) 2.5 MG tablet, Take 1 tablet by mouth once daily, Disp: 30 tablet, Rfl: 6    artificial tears (ISOPTO TEARS) 0.5 % ophthalmic solution, 1 drop as needed., Disp: , Rfl:     blood sugar diagnostic (BLOOD GLUCOSE TEST) Strp, True Metrix glucose strips check once daily, Disp: 100 each, Rfl: 3    blood-glucose meter kit, True Metrix glucometer check glucose once daily, Disp: 1 each, Rfl: 0    diazePAM (VALIUM) 2 MG tablet, Take 1 tablet (2 mg total) by mouth nightly as needed (muscle spasm)., Disp: 7 tablet, Rfl: 0    donepeziL (ARICEPT) 10 MG tablet, Take 1 tablet (10 mg total) by mouth every evening., Disp: 30 tablet, Rfl: 1    doxepin (SINEQUAN) 25 MG capsule, Take 1 capsule (25 mg total) by mouth every evening., Disp: 90 capsule, Rfl: 3    ergocalciferol (ERGOCALCIFEROL) 50,000 unit Cap, Take one capsule twice monthly., Disp: 6 capsule, Rfl: 3    fluticasone propionate (FLONASE) 50 mcg/actuation nasal spray, 1 spray (50 mcg total) by Each Nostril route once daily., Disp: 16 g, Rfl: 5    gabapentin (NEURONTIN) 100 MG capsule, TAKE 2 CAPSULES BY MOUTH IN THE EVENING, Disp: 60 capsule, Rfl: 0    ibuprofen (ADVIL,MOTRIN) 600 MG tablet, Take 1 tablet (600 mg total) by mouth every 6 (six) hours as needed  for Pain., Disp: 30 tablet, Rfl: 0    levothyroxine (SYNTHROID) 75 MCG tablet, Take by mouth., Disp: , Rfl:     loratadine (CLARITIN) 10 mg tablet, Take 1 tablet (10 mg total) by mouth once daily., Disp: 30 tablet, Rfl: 5    losartan (COZAAR) 100 MG tablet, Take 1 tablet by mouth once daily, Disp: 90 tablet, Rfl: 0    rosuvastatin (CRESTOR) 10 MG tablet, Take 1 tablet (10 mg total) by mouth once daily., Disp: 90 tablet, Rfl: 4    thiamine 100 MG tablet, Take 1 tablet (100 mg total) by mouth once daily., Disp: 30 tablet, Rfl: 5    tiZANidine (ZANAFLEX) 2 MG tablet, Take 2 tablets (4 mg total) by mouth every 8 (eight) hours as needed (Pain/spasm)., Disp: 30 tablet, Rfl: 0    valACYclovir (VALTREX) 500 MG tablet, Take one tablet (500mg) twice daily for three days for acute viral outbreak.  Start at the earliest sign of an outbreak, do not wait for visible rash (supply is enough for two outbreaks), Disp: 12 tablet, Rfl: 11    levothyroxine (SYNTHROID) 88 MCG tablet, Take 1 tablet (88 mcg total) by mouth before breakfast. (Patient not taking: Reported on 6/28/2024), Disp: 30 tablet, Rfl: 11  No current facility-administered medications for this visit.    Facility-Administered Medications Ordered in Other Visits:     lactated ringers infusion, , Intravenous, Continuous, Julián Chiang MD, Last Rate: 25 mL/hr at 03/27/24 1200, New Bag at 03/27/24 1200    LIDOcaine (PF) 10 mg/ml (1%) injection 10 mg, 1 mL, Intradermal, Once, Julián Chiang MD    Review of patient's allergies indicates:  No Known Allergies    Family History   Problem Relation Name Age of Onset    Cancer Mother          thyroid    Hypertension Mother      Hyperlipidemia Mother      Hypertension Father      Emphysema Father      Dementia Sister      Alzheimer's disease Sister      Breast cancer Sister  60    Cancer Brother          stomach    Cancer Brother      No Known Problems Daughter      Diabetes Son      Hypertension Son      Alcohol abuse Son      Diabetes  Maternal Aunt      Alzheimer's disease Maternal Uncle      Obesity Paternal Uncle      Cancer Maternal Grandmother      Colon cancer Other nephew     Prostate cancer Other nephew     Melanoma Neg Hx      Psoriasis Neg Hx      Lupus Neg Hx      Eczema Neg Hx      Amblyopia Neg Hx      Blindness Neg Hx      Cataracts Neg Hx      Glaucoma Neg Hx      Macular degeneration Neg Hx      Retinal detachment Neg Hx      Strabismus Neg Hx      Stroke Neg Hx      Thyroid cancer Neg Hx      Colon polyps Neg Hx      Ulcerative colitis Neg Hx      Stomach cancer Neg Hx      Rectal cancer Neg Hx       Social History     Occupational History    Not on file   Tobacco Use    Smoking status: Never    Smokeless tobacco: Never   Substance and Sexual Activity    Alcohol use: Not Currently     Comment: stopped 2019    Drug use: No    Sexual activity: Not Currently

## 2024-07-02 RX ORDER — TIZANIDINE 2 MG/1
TABLET ORAL
Qty: 30 TABLET | Refills: 0 | Status: SHIPPED | OUTPATIENT
Start: 2024-07-02

## 2024-07-05 ENCOUNTER — OFFICE VISIT (OUTPATIENT)
Dept: ENDOCRINOLOGY | Facility: CLINIC | Age: 79
End: 2024-07-05
Payer: MEDICARE

## 2024-07-05 VITALS
WEIGHT: 162.81 LBS | OXYGEN SATURATION: 97 % | HEIGHT: 66 IN | DIASTOLIC BLOOD PRESSURE: 82 MMHG | BODY MASS INDEX: 26.17 KG/M2 | SYSTOLIC BLOOD PRESSURE: 130 MMHG | HEART RATE: 95 BPM

## 2024-07-05 DIAGNOSIS — C73 THYROID CANCER: Primary | ICD-10-CM

## 2024-07-05 DIAGNOSIS — E89.0 POSTOPERATIVE HYPOTHYROIDISM: ICD-10-CM

## 2024-07-05 DIAGNOSIS — M85.80 OSTEOPENIA, UNSPECIFIED LOCATION: ICD-10-CM

## 2024-07-05 PROCEDURE — 99999 PR PBB SHADOW E&M-EST. PATIENT-LVL IV: CPT | Mod: PBBFAC,,, | Performed by: INTERNAL MEDICINE

## 2024-07-05 RX ORDER — UBIDECARENONE 30 MG
30 CAPSULE ORAL DAILY
COMMUNITY

## 2024-07-05 RX ORDER — LEVOTHYROXINE SODIUM 75 UG/1
75 TABLET ORAL
Qty: 90 TABLET | Refills: 3 | Status: SHIPPED | OUTPATIENT
Start: 2024-07-05

## 2024-07-05 NOTE — PROGRESS NOTES
"  CHIEF COMPLAINT:  Papillary thyroid cancer  78 y.o. old being seen as a new patient to me.  Patient had FNA of a left nodule that showed papillary thyroid cancer.  Subsequently had a total thyroidectomy in May of 2021.  She had a postoperative thyroglobulin that was high at 91.  Had a repeat surgery in September of 2021.  For 2nd surgery-  Lymph node positive on left level 2A, left level 3, left level 4 and paratracheal lymph node. Then treated with radioactive iodine-168 mCi in December of 2021.  Posttreatment whole-body scan showed activity in the neck.  She had a negative PET scan in June of 2023.    On synthroid 75 mcg daily. No palpitations. No tremors. No diarrhea or constipation. States neck tight. Still doing neck exercises. No dysphagia. NO heat/cold intolerance. No anxiety. No insomnia. She does take Ca + D. Exercises 3/week- at [facility]. No falls. No fractures.         4/29/21  1. FNA Thyroid, Left mid lat:   [facility] Thyroid Cytology Category: Malignant  Papillary thyroid   carcinoma.   Other findings and comments:   Macrophages.   2. FNA Thyroid, Left mid med:   [facility] Thyroid Cytology Category: Benign   Other findings and comments:   Macrophages.     Total thyroidectomy on May 19, 2021  SYNOPTIC REPORT  Procedure - Total thyroidectomy  Lymph node sampling ¿ Not performed  Tumor focality ¿ Multifocal  Tumor laterality ¿ Left lobe  Tumor size ¿ PTC, classic variant - 2.0 cm; 4 separate foci of PTC,  follicular variant range in size from 2-5mm  Histologic type - Papillary carcinoma, classic and follicular variant  Margins ¿ Positive for PTC, classic variant, multifocal (posterior aspect)  Angioinvasion ¿ Not identified  Lymphatic invasion ¿ Not identified  Extrathyroidal extension - Not identified (cannot be adequately evaluated due  to margin positivity)  Pathologic staging  ¿ pT1 Nx Mx     Completion surgery on 09/13/2021    1. Skin and soft tissue, "scar tissue neck", " "excision:  - Benign skin and soft tissue with surgical site changes  2. Lymph node, left neck, level 5, excision:  - Two lymph nodes negative for malignancy (0/2)  3. 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 Lymph node, left neck, level 4, excision:  - Two of fourteen lymph nodes positive for metastatic carcinoma (2/14)  6. Soft tissue, "tissue by straps", excision:  - Benign fibrovascular adipose tissue and skeletal muscle with surgical site  changes  - Negative for malignancy  7. "Completion thyroidectomy and paratracheal lymph node dissection",  excision:  - One of two lymph nodes positive for metastatic papillary thyroid carcinoma  (1/2)  - Fragments of skeletal muscle with surgical site changes  - Fragments of unremarkable thymic tissue  - No definitive thyroid parenchyma identified         PAST MEDICAL HISTORY/PAST SURGICAL HISTORY:  Reviewed in [facility]    SOCIAL HISTORY: Reviewed in [facility]    FAMILY HISTORY:  Mother had unknown thyroid condition. No known thyroid cancer. + DM 2.     MEDICATIONS/ALLERGIES: The patient's MedCard has been updated and reviewed.            PE:    GENERAL: Well developed, well nourished.  NECK: Supple, trachea midline, no palpable thyroid nodules  CHEST: Resp even and unlabored, CTA bilateral.  CARDIAC: RRR, S1, S2 heard, no murmurs, rubs, S3, or S4    LABS/Radiology     Latest Reference Range & Units 04/19/24 08:45 06/12/24 08:45   TSH 0.400 - 4.000 uIU/mL 1.206 0.304 (L)   Free T4 0.71 - 1.51 ng/dL 1.12 1.21   Thyroglobulin Interpretation  SEE BELOW SEE BELOW   Thyroglobulin Antibody Screen <1.8 IU/mL <1.8 <1.8   Thyroglobulin, Tumor Marker ng/mL 9.3 (H) 7.4 (H)   (L): Data is abnormally low  (H): Data is abnormally high   Latest Reference Range & Units 09/30/22 " 08:15 05/22/23 07:06 10/25/23 15:48   Thyroglobulin, Tumor Marker ng/mL 7.6 (H) 16 (H) 7.2 (H)   (H): Data is abnormally high  US SOFT TISSUE HEAD NECK     CLINICAL HISTORY:  Postprocedural hypothyroidism     TECHNIQUE:  Ultrasound of the thyroid and cervical lymph nodes was performed.     COMPARISON:  11/03/2023     FINDINGS:  There is no tissue along either thyroid bed.     There are several lymph nodes on the right neck at the R2 level with short axis diameters each of 3 mm.     There are several lymph nodes in the left neck including L2 and L6 levels.  The L2 level lymph node has a short axis diameter 0.6 cm and the L6 lymph node has a short axis diameter of 0.2 cm.     Impression:     Prior thyroidectomy.  Several normal size cervical lymph nodes.    ASSESSMENT/PLAN:  1. Papillary thyroid cancer-initially had surgery in May of 2021.  Postoperative thyroglobulin was 91.  Therefore had a repeat surgery in September of 2021.  Subsequently had 168 mCi of radioactive iodine.  Posttreatment whole-body scan showed activity in the thyroid bed.  Since then her thyroglobulin has been detectable.  1 time her TSH increased as well as her thyroglobulin.  Had a PET scan which was negative.  Her TSH has been detectable at approximately 7.  Does not appear to be going up.  Ultrasound does not show any concerning lesions.  I did discuss with the patient that there could be microscopic residual disease.  Nothing visible on testing.  We will need to keep TSH suppressed.  Continue to monitor thyroglobulin.  If increases, we will need to assess for either disease that can be surgically removed versus an empiric dose of radioactive iodine.    2.  Hypothyroidism-she does appear to be taking her thyroid medication appropriately.  We will need to suppress her TSH because of detectable thyroglobulin.  Discussed hyperthyroid symptoms to monitor for.  If thyroglobulin increases any further, may need to get TSH closer to 0.1.    3.   Osteopenia-discussed suppressed TSH can worsen osteopenia.  She is due for  another bone density in November of 2025.  She is taking calcium and vitamin-D.  She is doing weight-bearing exercise.  No fracture history.      FOLLOWUP  F/U 6 months with TSH, Tg

## 2024-07-08 ENCOUNTER — OFFICE VISIT (OUTPATIENT)
Dept: FAMILY MEDICINE | Facility: CLINIC | Age: 79
End: 2024-07-08
Payer: MEDICARE

## 2024-07-08 ENCOUNTER — TELEPHONE (OUTPATIENT)
Dept: FAMILY MEDICINE | Facility: CLINIC | Age: 79
End: 2024-07-08
Payer: MEDICARE

## 2024-07-08 VITALS
TEMPERATURE: 97 F | HEIGHT: 67 IN | SYSTOLIC BLOOD PRESSURE: 130 MMHG | WEIGHT: 161.38 LBS | HEART RATE: 93 BPM | DIASTOLIC BLOOD PRESSURE: 62 MMHG | BODY MASS INDEX: 25.33 KG/M2 | OXYGEN SATURATION: 98 %

## 2024-07-08 DIAGNOSIS — R42 LIGHTHEADEDNESS: ICD-10-CM

## 2024-07-08 DIAGNOSIS — E11.59 HYPERTENSION ASSOCIATED WITH DIABETES: Chronic | ICD-10-CM

## 2024-07-08 DIAGNOSIS — I25.3 ATRIAL SEPTAL ANEURYSM: ICD-10-CM

## 2024-07-08 DIAGNOSIS — I15.2 HYPERTENSION ASSOCIATED WITH DIABETES: Chronic | ICD-10-CM

## 2024-07-08 DIAGNOSIS — E11.9 CONTROLLED TYPE 2 DIABETES MELLITUS WITHOUT COMPLICATION, WITHOUT LONG-TERM CURRENT USE OF INSULIN: ICD-10-CM

## 2024-07-08 DIAGNOSIS — I95.1 ORTHOSTATIC HYPOTENSION: Primary | ICD-10-CM

## 2024-07-08 LAB
OHS QRS DURATION: 78 MS
OHS QTC CALCULATION: 441 MS

## 2024-07-08 PROCEDURE — 93005 ELECTROCARDIOGRAM TRACING: CPT | Mod: S$GLB,,,

## 2024-07-08 PROCEDURE — 3075F SYST BP GE 130 - 139MM HG: CPT | Mod: CPTII,S$GLB,,

## 2024-07-08 PROCEDURE — 1160F RVW MEDS BY RX/DR IN RCRD: CPT | Mod: CPTII,S$GLB,,

## 2024-07-08 PROCEDURE — 3078F DIAST BP <80 MM HG: CPT | Mod: CPTII,S$GLB,,

## 2024-07-08 PROCEDURE — 99214 OFFICE O/P EST MOD 30 MIN: CPT | Mod: S$GLB,,,

## 2024-07-08 PROCEDURE — 1126F AMNT PAIN NOTED NONE PRSNT: CPT | Mod: CPTII,S$GLB,,

## 2024-07-08 PROCEDURE — 3288F FALL RISK ASSESSMENT DOCD: CPT | Mod: CPTII,S$GLB,,

## 2024-07-08 PROCEDURE — 1159F MED LIST DOCD IN RCRD: CPT | Mod: CPTII,S$GLB,,

## 2024-07-08 PROCEDURE — 1101F PT FALLS ASSESS-DOCD LE1/YR: CPT | Mod: CPTII,S$GLB,,

## 2024-07-08 PROCEDURE — 99999 PR PBB SHADOW E&M-EST. PATIENT-LVL IV: CPT | Mod: PBBFAC,,,

## 2024-07-08 PROCEDURE — 93010 ELECTROCARDIOGRAM REPORT: CPT | Mod: S$GLB,,, | Performed by: INTERNAL MEDICINE

## 2024-07-08 PROCEDURE — 3072F LOW RISK FOR RETINOPATHY: CPT | Mod: CPTII,S$GLB,,

## 2024-07-08 NOTE — PROGRESS NOTES
Subjective:       Patient ID: Erica Masterson is a 78 y.o. female.    Chief Complaint: dizziness      Erica Masterson is a 78 y.o. female with HTN, DM2, thyroid cancer who presents to clinic for evaluation of an episode of weakness and lightheadedness. Pt reports she felt weak and lightheaded prior to group exercise class this morning. She states she was sitting down and stood up, and that is when her symptoms began. Pt states symptoms lasted 3-5 minutes. She notes she had not eaten prior to arriving to her exercise class. She states she received some water and candy, which helped her symptoms. She reports drinking about 3-4 glasses of water a day. States she typically has cereal or banana for breakfast prior to her exercise class, but she was not hungry today. Denies prior or frequent episodes, other than 1 single episode about 1 year ago. She denies fever, chills, changes in appetite, sweats, shortness of breath, chest pain, syncope or headaches.         Review of Systems   Constitutional:  Negative for appetite change, chills, fatigue and fever.   Respiratory:  Negative for cough and shortness of breath.    Cardiovascular:  Negative for chest pain and palpitations.   Gastrointestinal:  Negative for blood in stool.   Neurological:  Positive for weakness (resolved) and light-headedness (resolved). Negative for dizziness, syncope and headaches.         Past Medical History:   Diagnosis Date    Adenomatous polyp of colon 3/26/2012    Allergy     Anxiety     Cancer     Cataract     Colon polyp     Degenerative arthritis of knee 04/03/2012    Diverticulosis     Encounter for blood transfusion     GERD (gastroesophageal reflux disease)     History of thyroid cancer 2021    Hyperlipidemia     Hypertension     Lateral meniscal tear 5/20/2013    Multinodular goiter 03/26/2012    Myopathy, unspecified 01/18/2010    Tuberculosis        Review of patient's allergies indicates:  No Known Allergies      Current Outpatient  Medications:     amLODIPine (NORVASC) 2.5 MG tablet, Take 1 tablet by mouth once daily, Disp: 30 tablet, Rfl: 6    artificial tears (ISOPTO TEARS) 0.5 % ophthalmic solution, 1 drop as needed., Disp: , Rfl:     blood sugar diagnostic (BLOOD GLUCOSE TEST) Strp, True Metrix glucose strips check once daily, Disp: 100 each, Rfl: 3    co-enzyme Q-10 30 mg capsule, Take 30 mg by mouth once daily., Disp: , Rfl:     donepeziL (ARICEPT) 10 MG tablet, Take 1 tablet (10 mg total) by mouth every evening., Disp: 30 tablet, Rfl: 1    doxepin (SINEQUAN) 25 MG capsule, Take 1 capsule (25 mg total) by mouth every evening., Disp: 90 capsule, Rfl: 3    ergocalciferol (ERGOCALCIFEROL) 50,000 unit Cap, Take one capsule twice monthly., Disp: 6 capsule, Rfl: 3    fluticasone propionate (FLONASE) 50 mcg/actuation nasal spray, 1 spray (50 mcg total) by Each Nostril route once daily., Disp: 16 g, Rfl: 5    gabapentin (NEURONTIN) 100 MG capsule, TAKE 2 CAPSULES BY MOUTH IN THE EVENING, Disp: 60 capsule, Rfl: 0    ibuprofen (ADVIL,MOTRIN) 600 MG tablet, Take 1 tablet (600 mg total) by mouth every 6 (six) hours as needed for Pain., Disp: 30 tablet, Rfl: 0    levothyroxine (SYNTHROID) 75 MCG tablet, Take 1 tablet (75 mcg total) by mouth before breakfast., Disp: 90 tablet, Rfl: 3    loratadine (CLARITIN) 10 mg tablet, Take 1 tablet (10 mg total) by mouth once daily., Disp: 30 tablet, Rfl: 5    losartan (COZAAR) 100 MG tablet, Take 1 tablet by mouth once daily, Disp: 90 tablet, Rfl: 0    rosuvastatin (CRESTOR) 10 MG tablet, Take 1 tablet (10 mg total) by mouth once daily., Disp: 90 tablet, Rfl: 4    thiamine 100 MG tablet, Take 1 tablet (100 mg total) by mouth once daily., Disp: 30 tablet, Rfl: 5    tiZANidine (ZANAFLEX) 2 MG tablet, TAKE 2 TABLETS BY MOUTH EVERY 8 HOURS AS NEEDED FOR PAIN /SPASM, Disp: 30 tablet, Rfl: 0    valACYclovir (VALTREX) 500 MG tablet, Take one tablet (500mg) twice daily for three days for acute viral outbreak.  Start at  the earliest sign of an outbreak, do not wait for visible rash (supply is enough for two outbreaks), Disp: 12 tablet, Rfl: 11    blood-glucose meter kit, True Metrix glucometer check glucose once daily, Disp: 1 each, Rfl: 0    diazePAM (VALIUM) 2 MG tablet, Take 1 tablet (2 mg total) by mouth nightly as needed (muscle spasm)., Disp: 7 tablet, Rfl: 0  No current facility-administered medications for this visit.    Facility-Administered Medications Ordered in Other Visits:     lactated ringers infusion, , Intravenous, Continuous, Julián Chiang MD, Last Rate: 25 mL/hr at 03/27/24 1200, New Bag at 03/27/24 1200    LIDOcaine (PF) 10 mg/ml (1%) injection 10 mg, 1 mL, Intradermal, Once, Julián Chiang MD    Objective:        Physical Exam  Vitals reviewed.   Constitutional:       General: She is not in acute distress.     Appearance: Normal appearance.   HENT:      Head: Normocephalic and atraumatic.   Eyes:      Extraocular Movements: Extraocular movements intact.      Conjunctiva/sclera: Conjunctivae normal.      Pupils: Pupils are equal, round, and reactive to light.   Cardiovascular:      Rate and Rhythm: Normal rate and regular rhythm.      Heart sounds: Normal heart sounds.      Comments: Orthostatic vitals taken by Meryl Fischer MA  Sitting 130/62 HR 93  Standing 112/64   Laying 122/80 HR 87  Pulmonary:      Effort: Pulmonary effort is normal.      Breath sounds: Normal breath sounds.   Musculoskeletal:         General: Normal range of motion.      Cervical back: Normal range of motion and neck supple.   Skin:     General: Skin is warm and dry.   Neurological:      General: No focal deficit present.      Mental Status: She is alert and oriented to person, place, and time.      Cranial Nerves: No cranial nerve deficit.      Motor: Motor function is intact.      Coordination: Coordination is intact. Finger-Nose-Finger Test normal.      Gait: Gait is intact.   Psychiatric:         Behavior: Behavior normal.        "    Visit Vitals  /62 (BP Location: Right arm, Patient Position: Sitting, BP Method: Small (Manual))   Pulse 93   Temp 97.2 °F (36.2 °C) (Oral)   Ht 5' 7" (1.702 m)   Wt 73.2 kg (161 lb 6 oz)   SpO2 98%   BMI 25.28 kg/m²      Assessment:         1. Orthostatic hypotension    2. Hypertension associated with diabetes    3. Controlled type 2 diabetes mellitus without complication, without long-term current use of insulin    4. Atrial septal aneurysm    5. Lightheadedness        Plan:         Diagnoses and all orders for this visit:    Orthostatic hypotension   -   Recommend she eat small breakfast prior to exercise class. Increase water intake to 6-8 cups of water daily.   -     Change position slowly to avoid lightheadedness/ dizziness.   -     Avoid exercise with frequent position changes or very strenuous exercise    Lightheadedness  -     IN OFFICE EKG 12-LEAD (to Muse)  -     EKG NSR. Lightheadedness resolved at this time.     Hypertension associated with diabetes         -    Well managed on Norvasc and Losartan.    Controlled type 2 diabetes mellitus without complication, without long-term current use of insulin          -   Well controlled.    Atrial septal aneurysm           -   Stable on echo completed 8/2022. Following with cardiology, Dr. Melendez.           Follow up for further evaluation if she experiences any further episodes of lightheadedness/ weakness.         Family Medicine Physician Assistant     Future Appointments       Date Provider Specialty Appt Notes    8/13/2024 Wayne Michelle MD Ophthalmology yearly exam    12/4/2024 Narayan Myers MD Family Medicine f/u    1/3/2025  Lab     1/10/2025 Jean Daugherty DO Endocrinology 6 mo f/u             Tests to Keep You Healthy    Eye Exam: DUE  Colon Cancer Screening: Met on 11/16/2023  Last Blood Pressure <= 139/89 (7/8/2024): Yes       I spent a total of 30 minutes on the day of the visit.This includes face to face time and non-face to face " time preparing to see the patient (eg, review of tests), obtaining and/or reviewing separately obtained history, documenting clinical information in the electronic or other health record, independently interpreting results and communicating results to the patient/family/caregiver, or care coordinator.

## 2024-07-08 NOTE — LETTER
July 8, 2024      [facility]  [address] [place] [address]  Phone: [phone]  Fax: [phone]       Patient: Erica Masterson   YOB: 1945  Date of Visit: 07/08/2024    To Whom It May Concern:    Rebekah Masterson  was at [facility] on 07/08/2024. The patient may return to exercise classes on 7/9/24 with no restrictions. If you have any questions or concerns, or if I can be of further assistance, please do not hesitate to contact me.    Sincerely,    Bette Salazar PA-C

## 2024-07-08 NOTE — TELEPHONE ENCOUNTER
Patient came to office with - she became dizzy during exercise class this morning-had near syncope-took bp 124/70 p86- appt made today with NP for evaluation.

## 2024-07-12 ENCOUNTER — OFFICE VISIT (OUTPATIENT)
Dept: FAMILY MEDICINE | Facility: CLINIC | Age: 79
End: 2024-07-12
Payer: MEDICARE

## 2024-07-12 ENCOUNTER — TELEPHONE (OUTPATIENT)
Dept: FAMILY MEDICINE | Facility: CLINIC | Age: 79
End: 2024-07-12
Payer: MEDICARE

## 2024-07-12 VITALS
HEIGHT: 67 IN | BODY MASS INDEX: 25.33 KG/M2 | WEIGHT: 161.38 LBS | DIASTOLIC BLOOD PRESSURE: 60 MMHG | TEMPERATURE: 98 F | OXYGEN SATURATION: 97 % | HEART RATE: 85 BPM | SYSTOLIC BLOOD PRESSURE: 136 MMHG

## 2024-07-12 DIAGNOSIS — E78.5 HYPERLIPIDEMIA ASSOCIATED WITH TYPE 2 DIABETES MELLITUS: Primary | Chronic | ICD-10-CM

## 2024-07-12 DIAGNOSIS — E11.9 CONTROLLED TYPE 2 DIABETES MELLITUS WITHOUT COMPLICATION, WITHOUT LONG-TERM CURRENT USE OF INSULIN: ICD-10-CM

## 2024-07-12 DIAGNOSIS — N18.31 STAGE 3A CHRONIC KIDNEY DISEASE: ICD-10-CM

## 2024-07-12 DIAGNOSIS — I15.2 HYPERTENSION ASSOCIATED WITH DIABETES: ICD-10-CM

## 2024-07-12 DIAGNOSIS — E11.69 HYPERLIPIDEMIA ASSOCIATED WITH TYPE 2 DIABETES MELLITUS: Primary | Chronic | ICD-10-CM

## 2024-07-12 DIAGNOSIS — E11.59 HYPERTENSION ASSOCIATED WITH DIABETES: ICD-10-CM

## 2024-07-12 PROCEDURE — 99999 PR PBB SHADOW E&M-EST. PATIENT-LVL IV: CPT | Mod: PBBFAC,,,

## 2024-07-12 RX ORDER — AMLODIPINE BESYLATE 2.5 MG/1
2.5 TABLET ORAL DAILY
Qty: 30 TABLET | Refills: 11 | Status: SHIPPED | OUTPATIENT
Start: 2024-07-12

## 2024-07-12 NOTE — PROGRESS NOTES
Subjective:       Patient ID: Erica Masterson is a 78 y.o. female.    Chief Complaint: follow up       Erica Masterson is a 78 y.o. female with HTN, HLD, DM2, CKD3 who presents to clinic for follow up. Doing well with no acute concerns. Labs updated in April. Her diabetic eye exam is scheduled next month with Dr. Michelle. She reports her dizziness has improved since she last saw me. She has been working on staying well hydrated, eating small meals and changing position slowly.         Review of Systems   Constitutional:  Negative for activity change, appetite change and fatigue.   Respiratory:  Negative for cough and shortness of breath.    Cardiovascular:  Negative for chest pain, palpitations and leg swelling.   Gastrointestinal:  Negative for abdominal pain, constipation and diarrhea.   Musculoskeletal:  Negative for arthralgias.   Skin:  Negative for rash.   Neurological:  Negative for dizziness, light-headedness and headaches.   Psychiatric/Behavioral:  Negative for dysphoric mood. The patient is not nervous/anxious.          Past Medical History:   Diagnosis Date    Adenomatous polyp of colon 3/26/2012    Allergy     Anxiety     Cancer     Cataract     Colon polyp     Degenerative arthritis of knee 04/03/2012    Diverticulosis     Encounter for blood transfusion     GERD (gastroesophageal reflux disease)     History of thyroid cancer 2021    Hyperlipidemia     Hypertension     Lateral meniscal tear 5/20/2013    Multinodular goiter 03/26/2012    Myopathy, unspecified 01/18/2010    Tuberculosis        Review of patient's allergies indicates:  No Known Allergies      Current Outpatient Medications:     artificial tears (ISOPTO TEARS) 0.5 % ophthalmic solution, 1 drop as needed., Disp: , Rfl:     blood sugar diagnostic (BLOOD GLUCOSE TEST) Strp, True Metrix glucose strips check once daily, Disp: 100 each, Rfl: 3    co-enzyme Q-10 30 mg capsule, Take 30 mg by mouth once daily., Disp: , Rfl:     donepeziL (ARICEPT) 10  MG tablet, Take 1 tablet (10 mg total) by mouth every evening., Disp: 30 tablet, Rfl: 1    doxepin (SINEQUAN) 25 MG capsule, Take 1 capsule (25 mg total) by mouth every evening., Disp: 90 capsule, Rfl: 3    ergocalciferol (ERGOCALCIFEROL) 50,000 unit Cap, Take one capsule twice monthly., Disp: 6 capsule, Rfl: 3    fluticasone propionate (FLONASE) 50 mcg/actuation nasal spray, 1 spray (50 mcg total) by Each Nostril route once daily., Disp: 16 g, Rfl: 5    gabapentin (NEURONTIN) 100 MG capsule, TAKE 2 CAPSULES BY MOUTH IN THE EVENING, Disp: 60 capsule, Rfl: 0    ibuprofen (ADVIL,MOTRIN) 600 MG tablet, Take 1 tablet (600 mg total) by mouth every 6 (six) hours as needed for Pain., Disp: 30 tablet, Rfl: 0    levothyroxine (SYNTHROID) 75 MCG tablet, Take 1 tablet (75 mcg total) by mouth before breakfast., Disp: 90 tablet, Rfl: 3    loratadine (CLARITIN) 10 mg tablet, Take 1 tablet (10 mg total) by mouth once daily., Disp: 30 tablet, Rfl: 5    losartan (COZAAR) 100 MG tablet, Take 1 tablet by mouth once daily, Disp: 90 tablet, Rfl: 0    rosuvastatin (CRESTOR) 10 MG tablet, Take 1 tablet (10 mg total) by mouth once daily., Disp: 90 tablet, Rfl: 4    thiamine 100 MG tablet, Take 1 tablet (100 mg total) by mouth once daily., Disp: 30 tablet, Rfl: 5    tiZANidine (ZANAFLEX) 2 MG tablet, TAKE 2 TABLETS BY MOUTH EVERY 8 HOURS AS NEEDED FOR PAIN /SPASM, Disp: 30 tablet, Rfl: 0    valACYclovir (VALTREX) 500 MG tablet, Take one tablet (500mg) twice daily for three days for acute viral outbreak.  Start at the earliest sign of an outbreak, do not wait for visible rash (supply is enough for two outbreaks), Disp: 12 tablet, Rfl: 11    amLODIPine (NORVASC) 2.5 MG tablet, Take 1 tablet (2.5 mg total) by mouth once daily., Disp: 30 tablet, Rfl: 11    blood-glucose meter kit, True Metrix glucometer check glucose once daily, Disp: 1 each, Rfl: 0    diazePAM (VALIUM) 2 MG tablet, Take 1 tablet (2 mg total) by mouth nightly as needed (muscle  "spasm)., Disp: 7 tablet, Rfl: 0  No current facility-administered medications for this visit.    Facility-Administered Medications Ordered in Other Visits:     lactated ringers infusion, , Intravenous, Continuous, Julián Chiang MD, Last Rate: 25 mL/hr at 03/27/24 1200, New Bag at 03/27/24 1200    LIDOcaine (PF) 10 mg/ml (1%) injection 10 mg, 1 mL, Intradermal, Once, Julián Chiang MD    Objective:        Physical Exam  Vitals reviewed.   Constitutional:       Appearance: Normal appearance.   HENT:      Head: Normocephalic and atraumatic.   Eyes:      Conjunctiva/sclera: Conjunctivae normal.   Cardiovascular:      Rate and Rhythm: Normal rate and regular rhythm.      Heart sounds: Normal heart sounds.   Pulmonary:      Effort: Pulmonary effort is normal.      Breath sounds: Normal breath sounds.   Musculoskeletal:         General: Normal range of motion.      Cervical back: Normal range of motion and neck supple.   Skin:     General: Skin is warm and dry.   Neurological:      Mental Status: She is alert and oriented to person, place, and time.   Psychiatric:         Behavior: Behavior normal.           Visit Vitals  /60 (BP Location: Right arm, Patient Position: Sitting, BP Method: Small (Manual))   Pulse 85   Temp 98.2 °F (36.8 °C) (Oral)   Ht 5' 7" (1.702 m)   Wt 73.2 kg (161 lb 6 oz)   SpO2 97%   BMI 25.28 kg/m²      Assessment:         1. Hyperlipidemia associated with type 2 diabetes mellitus, baseline     2. Hypertension associated with diabetes    3. Stage 3a chronic kidney disease    4. Controlled type 2 diabetes mellitus without complication, without long-term current use of insulin        Plan:         Diagnoses and all orders for this visit:    Hyperlipidemia associated with type 2 diabetes mellitus, baseline        -    Well controlled on Crestor.     Hypertension associated with diabetes  -     amLODIPine (NORVASC) 2.5 MG tablet; Take 1 tablet (2.5 mg total) by mouth once daily.  -     " Well controlled on Losartan and Norvasc    Stage 3a chronic kidney disease         -    Stable, continue to monitor    Controlled type 2 diabetes mellitus without complication, without long-term current use of insulin          -    Well controlled          -     Diabetic eye exam as scheduled. Labs as ordered by endocrinology.       Follow up as scheduled with PCP or sooner if needed.         Family Medicine Physician Assistant     Future Appointments       Date Provider Specialty Appt Notes    8/13/2024 Wayne Michelle MD Ophthalmology yearly exam    12/4/2024 Narayan Myers MD Family Medicine f/u    1/3/2025  Lab     1/10/2025 Jean Daugherty,  Endocrinology 6 mo f/u             Tests to Keep You Healthy    Eye Exam: DUE  Colon Cancer Screening: Met on 11/16/2023  Last Blood Pressure <= 139/89 (7/12/2024): Yes       I spent a total of 25 minutes on the day of the visit.This includes face to face time and non-face to face time preparing to see the patient (eg, review of tests), obtaining and/or reviewing separately obtained history, documenting clinical information in the electronic or other health record, independently interpreting results and communicating results to the patient/family/caregiver, or care coordinator.

## 2024-07-12 NOTE — TELEPHONE ENCOUNTER
----- Message from Narciso Fontana sent at 7/12/2024  8:09 AM CDT -----  Contact: self  Type: Sooner Appointment Request        Caller is requesting a sooner appointment. Caller declined first available appointment listed below. Caller will not accept being placed on the waitlist and is requesting a message be sent to doctor.        Name of Caller: Patient   Best Call Back Number: [phone]  Additional Information: plz put in a full blood panel for the pt. Pt will to come in today  for a full physical exam. Plz do her blood work when arriving. Pt feels weak in the morning.  Thanks

## 2024-07-12 NOTE — TELEPHONE ENCOUNTER
Called patient- she thought she had lab to do today and see Dr. Myers. She was reminded her lab appt is 7/15/24 and appt with pcp is 12/4/24.

## 2024-07-12 NOTE — TELEPHONE ENCOUNTER
----- Message from Salima Forrest sent at 7/12/2024  7:05 AM CDT -----  Contact: pt  Type: Needs Medical Advice      Who Called: pt    Best Call Back Number:[phone]    Additional Information: Requesting a call back regarding pt has episodes of fatigue and weakness. Pt said it been on and off for the past few mths  and asking if she can be seen today. Pt is wanting labs done also    Please Advise- Thank you

## 2024-07-15 ENCOUNTER — HOSPITAL ENCOUNTER (OUTPATIENT)
Dept: RADIOLOGY | Facility: HOSPITAL | Age: 79
Discharge: HOME | End: 2024-07-15
Attending: FAMILY MEDICINE
Payer: MEDICARE

## 2024-07-15 DIAGNOSIS — R92.8 ABNORMALITY OF LEFT BREAST ON SCREENING MAMMOGRAM: ICD-10-CM

## 2024-07-15 DIAGNOSIS — Z98.890 STATUS POST LEFT BREAST BIOPSY: ICD-10-CM

## 2024-07-15 DIAGNOSIS — R20.0 NUMBNESS AND TINGLING: ICD-10-CM

## 2024-07-15 DIAGNOSIS — R20.2 NUMBNESS AND TINGLING: ICD-10-CM

## 2024-07-15 PROCEDURE — 77062 BREAST TOMOSYNTHESIS BI: CPT | Mod: 26,,, | Performed by: RADIOLOGY

## 2024-07-15 PROCEDURE — 77066 DX MAMMO INCL CAD BI: CPT | Mod: 26,,, | Performed by: RADIOLOGY

## 2024-07-15 PROCEDURE — 77062 BREAST TOMOSYNTHESIS BI: CPT | Mod: TC

## 2024-07-15 NOTE — TELEPHONE ENCOUNTER
No care due was identified.  [facility] Embedded Care Due Messages. Reference number: [id number].   7/15/2024 5:00:53 PM CDT

## 2024-07-16 RX ORDER — GABAPENTIN 100 MG/1
200 CAPSULE ORAL NIGHTLY
Qty: 60 CAPSULE | Refills: 3 | Status: SHIPPED | OUTPATIENT
Start: 2024-07-16

## 2024-07-20 DIAGNOSIS — E78.5 HYPERLIPIDEMIA ASSOCIATED WITH TYPE 2 DIABETES MELLITUS: ICD-10-CM

## 2024-07-20 DIAGNOSIS — E11.69 HYPERLIPIDEMIA ASSOCIATED WITH TYPE 2 DIABETES MELLITUS: ICD-10-CM

## 2024-07-22 ENCOUNTER — TELEPHONE (OUTPATIENT)
Dept: ENDOCRINOLOGY | Facility: CLINIC | Age: 79
End: 2024-07-22
Payer: MEDICARE

## 2024-07-22 DIAGNOSIS — E89.0 POSTOPERATIVE HYPOTHYROIDISM: ICD-10-CM

## 2024-07-22 DIAGNOSIS — C73 THYROID CANCER: Primary | ICD-10-CM

## 2024-07-22 RX ORDER — ROSUVASTATIN CALCIUM 10 MG/1
10 TABLET, COATED ORAL
Qty: 90 TABLET | Refills: 0 | Status: SHIPPED | OUTPATIENT
Start: 2024-07-22

## 2024-07-22 RX ORDER — LEVOTHYROXINE SODIUM 88 UG/1
88 TABLET ORAL
Qty: 30 TABLET | Refills: 11 | Status: SHIPPED | OUTPATIENT
Start: 2024-07-22 | End: 2025-07-22

## 2024-07-22 NOTE — TELEPHONE ENCOUNTER
----- Message from Jose Walters sent at 7/22/2024  9:44 AM CDT -----  Regarding: advice  Contact: patient  Type: Needs Medical Advice  Who Called:  patient   Symptoms (please be specific):    How long has patient had these symptoms:    Pharmacy name and phone #:    Best Call Back Number: [phone]  Additional Information: Patient have questions about her medication. She would like to know if they should be adjusted. Please call to advise. Thanks

## 2024-07-22 NOTE — TELEPHONE ENCOUNTER
Increase synthroid to 88 mcg daily  Unsure if symptoms related to thyroid  Although Tg slightly increased from before, lower than it was 1 yr ago  Add Ultrasound to f/u labs in 6 months    Check TSh 6 weeks

## 2024-07-22 NOTE — TELEPHONE ENCOUNTER
S/w pt states has been having weakness, leg cramps ,unable to sleep, forgetfulness.dry mouth for awhile(states when she saw you she had this symptoms but forgot to tell you). States taking Levothyroxine 75 mcg and she has done labs recently    Please advise

## 2024-07-22 NOTE — TELEPHONE ENCOUNTER
Spoke with patient and notified her of Dr. Daugherty's previous message and verbalized understanding

## 2024-07-27 DIAGNOSIS — E78.5 HYPERLIPIDEMIA ASSOCIATED WITH TYPE 2 DIABETES MELLITUS: ICD-10-CM

## 2024-07-27 DIAGNOSIS — E11.69 HYPERLIPIDEMIA ASSOCIATED WITH TYPE 2 DIABETES MELLITUS: ICD-10-CM

## 2024-07-27 DIAGNOSIS — R41.3 MEMORY IMPAIRMENT: ICD-10-CM

## 2024-07-29 NOTE — TELEPHONE ENCOUNTER
Refill Routing Note   Medication(s) are not appropriate for processing by [facility] for the following reason(s):        Outside of protocol    [facility] action(s):  Route               Appointments  past 12m or future 3m with PCP    Date Provider   Last Visit   6/20/2024 Narayan Myers MD   Next Visit   12/4/2024 Narayan Myers MD   ED visits in past 90 days: 0        Note composed:10:48 AM 07/29/2024

## 2024-07-31 RX ORDER — DONEPEZIL HYDROCHLORIDE 10 MG/1
10 TABLET, FILM COATED ORAL NIGHTLY
Qty: 30 TABLET | Refills: 5 | Status: SHIPPED | OUTPATIENT
Start: 2024-07-31

## 2024-08-01 RX ORDER — ROSUVASTATIN CALCIUM 10 MG/1
10 TABLET, COATED ORAL
Qty: 90 TABLET | Refills: 0 | Status: SHIPPED | OUTPATIENT
Start: 2024-08-01

## 2024-08-02 ENCOUNTER — TELEPHONE (OUTPATIENT)
Dept: FAMILY MEDICINE | Facility: CLINIC | Age: 79
End: 2024-08-02
Payer: MEDICARE

## 2024-08-02 NOTE — TELEPHONE ENCOUNTER
Spoke with patient.   She is c/o muscle cramping and tightness in legs and feet.   She has Zanaflex prescription for this.   She will take the medication as prescribed      ----- Message from Rei Painter sent at 8/2/2024  3:21 PM CDT -----  Type:  Needs Medical Advice    Who Called: PT    Symptoms (please be specific): terrible cramps in feet and legs in the morning     How long has patient had these symptoms:  several months    Pharmacy name and phone #:    [id number] - [place] - [address]  [place] [address]  Phone: [phone] Fax: [phone]        Would the patient rather a call back or a response via [facility]? Call back    Best Call Back Number: [phone]      Additional Information:  Please advise -- Thank you

## 2024-08-07 ENCOUNTER — TELEPHONE (OUTPATIENT)
Dept: ENDOCRINOLOGY | Facility: CLINIC | Age: 79
End: 2024-08-07
Payer: MEDICARE

## 2024-08-13 ENCOUNTER — OFFICE VISIT (OUTPATIENT)
Dept: OPHTHALMOLOGY | Facility: CLINIC | Age: 79
End: 2024-08-13
Payer: MEDICARE

## 2024-08-13 DIAGNOSIS — H40.013 OPEN ANGLE WITH BORDERLINE FINDINGS AND LOW GLAUCOMA RISK IN BOTH EYES: ICD-10-CM

## 2024-08-13 DIAGNOSIS — H04.123 DRY EYE SYNDROME, BILATERAL: ICD-10-CM

## 2024-08-13 DIAGNOSIS — H25.813 COMBINED FORMS OF AGE-RELATED CATARACT, BILATERAL: ICD-10-CM

## 2024-08-13 DIAGNOSIS — E11.9 DIABETES MELLITUS TYPE 2 WITHOUT RETINOPATHY: Primary | ICD-10-CM

## 2024-08-13 PROCEDURE — 1159F MED LIST DOCD IN RCRD: CPT | Mod: CPTII,S$GLB,, | Performed by: OPHTHALMOLOGY

## 2024-08-13 PROCEDURE — 2023F DILAT RTA XM W/O RTNOPTHY: CPT | Mod: CPTII,S$GLB,, | Performed by: OPHTHALMOLOGY

## 2024-08-13 PROCEDURE — 1160F RVW MEDS BY RX/DR IN RCRD: CPT | Mod: CPTII,S$GLB,, | Performed by: OPHTHALMOLOGY

## 2024-08-13 PROCEDURE — 3288F FALL RISK ASSESSMENT DOCD: CPT | Mod: CPTII,S$GLB,, | Performed by: OPHTHALMOLOGY

## 2024-08-13 PROCEDURE — 99999 PR PBB SHADOW E&M-EST. PATIENT-LVL III: CPT | Mod: PBBFAC,,, | Performed by: OPHTHALMOLOGY

## 2024-08-13 PROCEDURE — 92133 CPTRZD OPH DX IMG PST SGM ON: CPT | Mod: S$GLB,,, | Performed by: OPHTHALMOLOGY

## 2024-08-13 PROCEDURE — 1101F PT FALLS ASSESS-DOCD LE1/YR: CPT | Mod: CPTII,S$GLB,, | Performed by: OPHTHALMOLOGY

## 2024-08-13 PROCEDURE — 92015 DETERMINE REFRACTIVE STATE: CPT | Mod: S$GLB,,, | Performed by: OPHTHALMOLOGY

## 2024-08-13 PROCEDURE — 1126F AMNT PAIN NOTED NONE PRSNT: CPT | Mod: CPTII,S$GLB,, | Performed by: OPHTHALMOLOGY

## 2024-08-13 PROCEDURE — 99213 OFFICE O/P EST LOW 20 MIN: CPT | Mod: S$GLB,,, | Performed by: OPHTHALMOLOGY

## 2024-08-13 RX ORDER — TIZANIDINE 2 MG/1
2 TABLET ORAL EVERY 8 HOURS
Qty: 30 TABLET | Refills: 0 | OUTPATIENT
Start: 2024-08-13

## 2024-08-13 NOTE — TELEPHONE ENCOUNTER
----- Message from Soo Myers sent at 8/13/2024  8:54 AM CDT -----  Contact: Self  Type:  RX Refill Request    Who Called:  Patient  Refill or New Rx:  Refill  RX Name and Strength:  tiZANidine (ZANAFLEX) 2 MG tablet  How is the patient currently taking it? (ex. 1XDay):  As directed for leg spasms  Is this a 30 day or 90 day RX:  90  Preferred Pharmacy with phone number:    [id number] - [place] - [address]  [place] [address]  Phone: [phone] Fax: [phone]  Local or Mail Order:  Local  Ordering Provider:  Dr Lucia Monzon Call Back Number:  [phone]  Additional Information:  Pt was prescribed this by Dr Cortes but wanted to see if Dr Myers could refill this for her leg spasms. Thank You

## 2024-08-13 NOTE — TELEPHONE ENCOUNTER
No care due was identified.  [facility] Embedded Care Due Messages. Reference number: [id number].   8/13/2024 9:19:45 AM CDT

## 2024-08-13 NOTE — PROGRESS NOTES
HPI     Diabetic Eye Exam     Additional comments: Here for annual DM eye exam. BG diet controlled.   Over past 2 days  OD ball socket feels like it was sore and tender feels   better right now. Denies eye pain today. Using OTC art tears at least   twice a day.   Lab Results       Component                Value               Date                       HGBA1C                   5.9                 07/15/2024                 HGBA1C                   5.9 (H)             04/19/2024                 HGBA1C                   5.8 (H)             10/25/2023                      Last edited by Suze Graves on 8/13/2024  2:05 PM.            Assessment /Plan     For exam results, see Encounter Report.    Diabetes mellitus type 2 without retinopathy    Combined forms of age-related cataract, bilateral    Open angle with borderline findings and low glaucoma risk in both eyes    Dry eye syndrome, bilateral      1. Diabetes mellitus type 2 without retinopathy  Diabetes without retinopathy, discussed with patient importance of glucose control and follow up.  Patient voices understanding.    2. Combined forms of age-related cataract, bilateral  OD>OS, pt not bothered  New glasses Rx today    3. Open angle with borderline findings and low glaucoma risk in both eyes  Mildly thin pachy  +famhx Aunt    Mild increased CDR OD>OS  IOP is normal  HVF essentially normal and stable  OCT normal and stable OU    Very Low risk  F/u 1 year, DFE, OCT NFL    4. Dry eye syndrome, bilateral  Recommend ATs up to QID PRN

## 2024-09-03 ENCOUNTER — LAB VISIT (OUTPATIENT)
Dept: LAB | Facility: HOSPITAL | Age: 79
End: 2024-09-03
Attending: INTERNAL MEDICINE
Payer: MEDICARE

## 2024-09-03 DIAGNOSIS — E89.0 POSTOPERATIVE HYPOTHYROIDISM: ICD-10-CM

## 2024-09-03 DIAGNOSIS — C73 THYROID CANCER: ICD-10-CM

## 2024-09-03 LAB
T4 FREE SERPL-MCNC: 1.34 NG/DL (ref 0.71–1.51)
TSH SERPL DL<=0.005 MIU/L-ACNC: 0.18 UIU/ML (ref 0.4–4)

## 2024-09-03 PROCEDURE — 36415 COLL VENOUS BLD VENIPUNCTURE: CPT | Mod: PO | Performed by: INTERNAL MEDICINE

## 2024-09-03 PROCEDURE — 84439 ASSAY OF FREE THYROXINE: CPT | Performed by: INTERNAL MEDICINE

## 2024-09-03 PROCEDURE — 84443 ASSAY THYROID STIM HORMONE: CPT | Performed by: INTERNAL MEDICINE

## 2024-09-04 ENCOUNTER — TELEPHONE (OUTPATIENT)
Dept: ENDOCRINOLOGY | Facility: CLINIC | Age: 79
End: 2024-09-04
Payer: MEDICARE

## 2024-09-06 ENCOUNTER — TELEPHONE (OUTPATIENT)
Dept: OTOLARYNGOLOGY | Facility: CLINIC | Age: 79
End: 2024-09-06
Payer: MEDICARE

## 2024-09-06 NOTE — TELEPHONE ENCOUNTER
----- Message from Clara Tatum sent at 9/5/2024  2:19 PM CDT -----  Regarding: pt advice  Contact: [phone]  Pt stated she is not having any complications she just wanted to know when she had surgery with Dr Torres in Sept 2021 she had both Thyroids removed pt would like to know what side was cancer found left or right

## 2024-09-17 DIAGNOSIS — I10 HYPERTENSION, ESSENTIAL: ICD-10-CM

## 2024-09-17 RX ORDER — LOSARTAN POTASSIUM 100 MG/1
100 TABLET ORAL
Qty: 90 TABLET | Refills: 0 | Status: SHIPPED | OUTPATIENT
Start: 2024-09-17

## 2024-09-18 ENCOUNTER — TELEPHONE (OUTPATIENT)
Dept: ENDOCRINOLOGY | Facility: CLINIC | Age: 79
End: 2024-09-18
Payer: MEDICARE

## 2024-09-18 NOTE — TELEPHONE ENCOUNTER
----- Message from Narayan Duong sent at 9/17/2024  3:05 PM CDT -----  Contact: Self  Type: Needs Medical Advice  Who Called:  Patient    Best Call Back Number: [phone]  Additional Information: Patient is requesting her most recent lab results from Sept 3rd be mailed to her.

## 2024-09-19 ENCOUNTER — TELEPHONE (OUTPATIENT)
Dept: FAMILY MEDICINE | Facility: CLINIC | Age: 79
End: 2024-09-19
Payer: MEDICARE

## 2024-09-19 NOTE — TELEPHONE ENCOUNTER
----- Message from Soo Myers sent at 9/19/2024  3:14 PM CDT -----  Contact: Self  Type: Needs Medical Advice  Who Called:  Patient  Best Call Back Number: [phone]    Additional Information: Pt stated has notes that she is in need of another mammogram, that she received a letter stating that she was supposed to get a repeat Mammo in 6 months after her last one in February. Can we please check on this and call pt back to schedule. Thank You

## 2024-09-19 NOTE — TELEPHONE ENCOUNTER
----- Message from Mack Guerra sent at 9/19/2024  2:53 PM CDT -----  Contact: Self  Type:  Mammogram    Caller is requesting to schedule their annual mammogram appointment.  Order is not listed in EPIC.  Please enter order and contact patient to schedule.    Name of Caller:  Patient  Where would they like the mammogram performed?  [facility]  Best Call Back Number:  [phone]   Additional Information:

## 2024-10-15 DIAGNOSIS — M85.88 OSTEOPENIA OF LUMBAR SPINE: ICD-10-CM

## 2024-10-15 DIAGNOSIS — E55.9 VITAMIN D INSUFFICIENCY: ICD-10-CM

## 2024-10-15 RX ORDER — ERGOCALCIFEROL 1.25 MG/1
CAPSULE ORAL
Qty: 6 CAPSULE | Refills: 0 | OUTPATIENT
Start: 2024-10-15

## 2024-10-18 ENCOUNTER — TELEPHONE (OUTPATIENT)
Dept: ENDOCRINOLOGY | Facility: CLINIC | Age: 79
End: 2024-10-18
Payer: MEDICARE

## 2024-10-18 NOTE — TELEPHONE ENCOUNTER
S/w pt and notified her that labs where r/s because she was seeing Dr. Daugherty in January and labs needed to be done closer to appt.Pt said she just got the letter and verb understanding

## 2024-10-18 NOTE — TELEPHONE ENCOUNTER
----- Message from Shannan sent at 10/18/2024  7:30 AM CDT -----  Contact: self  Type: Needs Medical Advice  Who Called:  pt   Best Call Back Number: [phone]  Additional Information: pt called to check the time of her labs she had jayesh for today but they were cancelled and the reason just says other.  Is this because she is going to see Dr Daugherty in [place] please call back to advise.

## 2024-10-22 RX ORDER — TIZANIDINE 2 MG/1
2 TABLET ORAL EVERY 8 HOURS
Qty: 30 TABLET | Refills: 2 | Status: SHIPPED | OUTPATIENT
Start: 2024-10-22

## 2024-10-27 DIAGNOSIS — E78.5 HYPERLIPIDEMIA ASSOCIATED WITH TYPE 2 DIABETES MELLITUS: ICD-10-CM

## 2024-10-27 DIAGNOSIS — E11.69 HYPERLIPIDEMIA ASSOCIATED WITH TYPE 2 DIABETES MELLITUS: ICD-10-CM

## 2024-10-28 RX ORDER — TIZANIDINE 2 MG/1
TABLET ORAL
Qty: 30 TABLET | Refills: 0 | Status: SHIPPED | OUTPATIENT
Start: 2024-10-28

## 2024-10-29 RX ORDER — ROSUVASTATIN CALCIUM 10 MG/1
10 TABLET, COATED ORAL
Qty: 90 TABLET | Refills: 1 | Status: SHIPPED | OUTPATIENT
Start: 2024-10-29

## 2024-11-04 DIAGNOSIS — E55.9 VITAMIN D INSUFFICIENCY: ICD-10-CM

## 2024-11-04 DIAGNOSIS — M85.88 OSTEOPENIA OF LUMBAR SPINE: ICD-10-CM

## 2024-11-04 RX ORDER — ERGOCALCIFEROL 1.25 MG/1
CAPSULE ORAL
Qty: 2 CAPSULE | Refills: 0 | OUTPATIENT
Start: 2024-11-04

## 2024-11-15 DIAGNOSIS — M85.88 OSTEOPENIA OF LUMBAR SPINE: ICD-10-CM

## 2024-11-15 DIAGNOSIS — E55.9 VITAMIN D INSUFFICIENCY: ICD-10-CM

## 2024-11-15 RX ORDER — ERGOCALCIFEROL 1.25 MG/1
CAPSULE ORAL
Qty: 2 CAPSULE | Refills: 0 | OUTPATIENT
Start: 2024-11-15

## 2024-11-15 RX ORDER — ERGOCALCIFEROL 1.25 MG/1
CAPSULE ORAL
Qty: 6 CAPSULE | Refills: 3 | Status: SHIPPED | OUTPATIENT
Start: 2024-11-15

## 2024-12-04 ENCOUNTER — OFFICE VISIT (OUTPATIENT)
Dept: FAMILY MEDICINE | Facility: CLINIC | Age: 79
End: 2024-12-04
Attending: FAMILY MEDICINE
Payer: MEDICARE

## 2024-12-04 VITALS
BODY MASS INDEX: 26.28 KG/M2 | HEIGHT: 67 IN | SYSTOLIC BLOOD PRESSURE: 128 MMHG | TEMPERATURE: 99 F | HEART RATE: 52 BPM | OXYGEN SATURATION: 98 % | WEIGHT: 167.44 LBS | DIASTOLIC BLOOD PRESSURE: 74 MMHG

## 2024-12-04 DIAGNOSIS — E11.69 HYPERLIPIDEMIA ASSOCIATED WITH TYPE 2 DIABETES MELLITUS: Chronic | ICD-10-CM

## 2024-12-04 DIAGNOSIS — C73 THYROID CANCER: Chronic | ICD-10-CM

## 2024-12-04 DIAGNOSIS — I15.2 HYPERTENSION ASSOCIATED WITH DIABETES: Chronic | ICD-10-CM

## 2024-12-04 DIAGNOSIS — E89.0 POSTOPERATIVE HYPOTHYROIDISM: Chronic | ICD-10-CM

## 2024-12-04 DIAGNOSIS — E11.9 CONTROLLED TYPE 2 DIABETES MELLITUS WITHOUT COMPLICATION, WITHOUT LONG-TERM CURRENT USE OF INSULIN: Primary | ICD-10-CM

## 2024-12-04 DIAGNOSIS — E11.59 HYPERTENSION ASSOCIATED WITH DIABETES: Chronic | ICD-10-CM

## 2024-12-04 DIAGNOSIS — E78.5 HYPERLIPIDEMIA ASSOCIATED WITH TYPE 2 DIABETES MELLITUS: Chronic | ICD-10-CM

## 2024-12-04 PROCEDURE — 99999 PR PBB SHADOW E&M-EST. PATIENT-LVL IV: CPT | Mod: PBBFAC,,, | Performed by: FAMILY MEDICINE

## 2024-12-04 PROCEDURE — 1160F RVW MEDS BY RX/DR IN RCRD: CPT | Mod: CPTII,S$GLB,, | Performed by: FAMILY MEDICINE

## 2024-12-04 PROCEDURE — 1126F AMNT PAIN NOTED NONE PRSNT: CPT | Mod: CPTII,S$GLB,, | Performed by: FAMILY MEDICINE

## 2024-12-04 PROCEDURE — 1100F PTFALLS ASSESS-DOCD GE2>/YR: CPT | Mod: CPTII,S$GLB,, | Performed by: FAMILY MEDICINE

## 2024-12-04 PROCEDURE — 3288F FALL RISK ASSESSMENT DOCD: CPT | Mod: CPTII,S$GLB,, | Performed by: FAMILY MEDICINE

## 2024-12-04 PROCEDURE — 1159F MED LIST DOCD IN RCRD: CPT | Mod: CPTII,S$GLB,, | Performed by: FAMILY MEDICINE

## 2024-12-04 PROCEDURE — 99214 OFFICE O/P EST MOD 30 MIN: CPT | Mod: S$GLB,,, | Performed by: FAMILY MEDICINE

## 2024-12-04 PROCEDURE — 3074F SYST BP LT 130 MM HG: CPT | Mod: CPTII,S$GLB,, | Performed by: FAMILY MEDICINE

## 2024-12-04 PROCEDURE — 3078F DIAST BP <80 MM HG: CPT | Mod: CPTII,S$GLB,, | Performed by: FAMILY MEDICINE

## 2024-12-04 RX ORDER — TIZANIDINE 2 MG/1
TABLET ORAL
Qty: 30 TABLET | Refills: 2 | Status: SHIPPED | OUTPATIENT
Start: 2024-12-04 | End: 2024-12-04 | Stop reason: SDUPTHER

## 2024-12-04 RX ORDER — TIZANIDINE 2 MG/1
TABLET ORAL
Qty: 30 TABLET | Refills: 2 | Status: SHIPPED | OUTPATIENT
Start: 2024-12-04

## 2024-12-04 NOTE — PROGRESS NOTES
Subjective:       Patient ID: Erica Masterson is a 79 y.o. female.    Chief Complaint: Follow-up    79-year-old female coming in for follow-up of multiple medical problems.  She has a history of thyroid cancer status post bilateral thyroidectomy in May of 2021 followed by a re-excision in September of 2021 for residual tissue.  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 Daugherty in Endocrinology.  Her recent TSH was well suppressed and no changes were made.  She has a longstanding history of prediabetes with one single A1c in early 2020 that breached the thyroid barrier at 6.5 and did not recur.  She is on no antihyperglycemic agents and is also being followed by Endocrinology with her most recent A1c well controlled at 5.8.  She has hypertension and hyperlipidemia as well as hypoparathyroidism and a number of other chronic conditions.  She feels well and has no active complaints at this time.  She is using tizanidine for occasional muscle spasm and needs a refill on that.  Her flu vaccine has been given on September 1st at [facility] pharmacy at the same time she got her pneumonia vaccine but was not found in the links system.    Past Medical History:  3/26/2012: Adenomatous polyp of colon  No date: Allergy  No date: Anxiety  No date: Cancer  No date: Cataract  No date: Colon polyp  04/03/2012: Degenerative arthritis of knee  No date: Diverticulosis  No date: Encounter for blood transfusion  No date: GERD (gastroesophageal reflux disease)  2021: History of thyroid cancer  No date: Hyperlipidemia  No date: Hypertension  5/20/2013: Lateral meniscal tear  03/26/2012: Multinodular goiter  01/18/2010: Myopathy, unspecified  No date: Tuberculosis    Past Surgical History:  2015: BREAST BIOPSY; Left      Comment:  benign  No date: CHOLECYSTECTOMY  12/02/2015: COLONOSCOPY      Comment:  Dr. Britton, multiple polyps, recheck five years-three                years if  more than 2 polyps are adenomatous  12/02/2015: COLONOSCOPY; N/A  03/20/2019: COLONOSCOPY; N/A      Comment:  Procedure: COLONOSCOPY;  Surgeon: Jose Britton MD;                Location: [facility];  Service: Endoscopy;  Laterality:                N/A;  05/20/2020: COLONOSCOPY; N/A      Comment:  Dr. Britton; internal hemorrhoids; diverticulosis;                polyps removed; repeat in 3 years  11/16/2023: COLONOSCOPY; N/A      Comment:  Procedure: COLONOSCOPY(instructions mailed 11/9);                 Surgeon: Jose Britton MD;  Location: [facility];                 Service: Endoscopy;  Laterality: N/A;  08/26/2020: CYSTOSCOPY; N/A      Comment:  Procedure: CYSTOSCOPY;  Surgeon: Charlotte Walters Jr., MD;               Location: [facility];  Service: Urology;  Laterality: N/A;  No date: DILATION AND CURETTAGE OF UTERUS  09/13/2021: DISSECTION OF NECK; Left      Comment:  Procedure: DISSECTION, NECK;  Surgeon: Ryan Torres MD;  Location: [facility] 2ND FLR;  Service: ENT;                 Laterality: Left;  3/27/2024: EPIDURAL STEROID INJECTION INTO CERVICAL SPINE; N/A      Comment:  Procedure: Injection-steroid-epidural-cervical;                 Surgeon: Julián Chiang MD;  Location: [facility] OR;                 Service: Anesthesiology;  Laterality: N/A;  c7-T1  7/20/2023: EPIDURAL STEROID INJECTION INTO LUMBAR SPINE; N/A      Comment:  Procedure: Injection-steroid-epidural-lumbar;  Surgeon:                Julián Chiang MD;  Location: [facility] OR;  Service: Pain                Management;  Laterality: N/A;  L5-s1  08/22/2022: ESOPHAGEAL MANOMETRY WITH MEASUREMENT OF IMPEDANCE; N/A      Comment:  Procedure: MANOMETRY, ESOPHAGUS, WITH IMPEDANCE                MEASUREMENT;  Surgeon: Pantera Mcguire MD;  Location: [facility] (4TH FLR);  Service: Endoscopy;  Laterality: N/A;                 8/4 fully vaccinated; instructions mailed-st  05/20/2020: ESOPHAGOGASTRODUODENOSCOPY; N/A      Comment:    Tobi; small hiatal hernia; gastritis; 8 gastric                polyps removed  04/01/2022: ESOPHAGOGASTRODUODENOSCOPY; N/A      Comment:  Procedure: EGD (ESOPHAGOGASTRODUODENOSCOPY);  Surgeon:                Jose Britton MD;  Location: [facility];  Service:                Endoscopy;  Laterality: N/A;  No date: FOOT SURGERY  No date: HYSTERECTOMY      Comment:  TVH/BSO > 20 years  8/4/2023: INCISION OF VULVA; Bilateral      Comment:  Procedure: EXCISION, CYST, LABIA AND OTHER INDICATED                PROCEDURES;  Surgeon: Mat Beach MD;  Location:                [facility] OR;  Service: OB/GYN;  Laterality: Bilateral;                 EXCYSION OF VULVAR CYST  12/1/2023: INJECTION, SACROILIAC JOINT; Right      Comment:  Procedure: INJECTION,SACROILIAC JOINT;  Surgeon: Julián Chiang MD;  Location: [facility] OR;  Service:                Anesthesiology;  Laterality: Right;  sacroiliac injection  06/04/2020: INTRALUMINAL GASTROINTESTINAL TRACT IMAGING VIA CAPSULE;   N/A      Comment:  Procedure: IMAGING PROCEDURE, GI TRACT, INTRALUMINAL,                VIA CAPSULE;  Surgeon: Jose Britton MD;  Location:                [facility];  Service: Endoscopy;  Laterality: N/A;  06/06/2013: KNEE SURGERY      Comment:  right knee tear Dr Iverson   09/17/2020: LAPAROSCOPIC CHOLECYSTECTOMY; N/A      Comment:  Procedure: CHOLECYSTECTOMY, LAPAROSCOPIC;  Surgeon:                Forrest Veronica MD;  Location: [facility];  Service:                General;  Laterality: N/A;  1966: LUNG LOBECTOMY      Comment:  right middle lobectomy, due to Tb  No date: OOPHORECTOMY  No date: SHOULDER SURGERY      Comment:  francis   05/19/2021: THYROIDECTOMY; Bilateral      Comment:  Procedure: THYROIDECTOMY;  Surgeon: Jamia Amezquita MD;  Location: [facility] OR [address] [place] [address];  Service: General;                 Laterality: Bilateral;  09/13/2021: THYROIDECTOMY; Bilateral      Comment:  Procedure: THYROIDECTOMY WITH INTRA-OP PTH;   Surgeon:                Ryan Torres MD;  Location: [facility] OR [address] [place] [address];  Service:               ENT;  Laterality: Bilateral;  NIM tube  Intraop PTH    Current Outpatient Medications on File Prior to Visit:  amLODIPine (NORVASC) 2.5 MG tablet, Take 1 tablet (2.5 mg total) by mouth once daily., Disp: 30 tablet, Rfl: 11  artificial tears (ISOPTO TEARS) 0.5 % ophthalmic solution, 1 drop as needed., Disp: , Rfl:   blood sugar diagnostic (BLOOD GLUCOSE TEST) Strp, True Metrix glucose strips check once daily, Disp: 100 each, Rfl: 3  blood-glucose meter kit, True Metrix glucometer check glucose once daily, Disp: 1 each, Rfl: 0  co-enzyme Q-10 30 mg capsule, Take 30 mg by mouth once daily., Disp: , Rfl:   diazePAM (VALIUM) 2 MG tablet, Take 1 tablet (2 mg total) by mouth nightly as needed (muscle spasm)., Disp: 7 tablet, Rfl: 0  donepeziL (ARICEPT) 10 MG tablet, TAKE 1 TABLET BY MOUTH ONCE DAILY IN THE EVENING, Disp: 30 tablet, Rfl: 5  doxepin (SINEQUAN) 25 MG capsule, Take 1 capsule (25 mg total) by mouth every evening., Disp: 90 capsule, Rfl: 3  ergocalciferol (ERGOCALCIFEROL) 50,000 unit Cap, Take one capsule twice monthly., Disp: 6 capsule, Rfl: 3  fluticasone propionate (FLONASE) 50 mcg/actuation nasal spray, 1 spray (50 mcg total) by Each Nostril route once daily., Disp: 16 g, Rfl: 5  ibuprofen (ADVIL,MOTRIN) 600 MG tablet, Take 1 tablet (600 mg total) by mouth every 6 (six) hours as needed for Pain., Disp: 30 tablet, Rfl: 0  levothyroxine (SYNTHROID) 88 MCG tablet, Take 1 tablet (88 mcg total) by mouth before breakfast., Disp: 30 tablet, Rfl: 11  loratadine (CLARITIN) 10 mg tablet, Take 1 tablet (10 mg total) by mouth once daily., Disp: 30 tablet, Rfl: 5  losartan (COZAAR) 100 MG tablet, Take 1 tablet by mouth once daily, Disp: 90 tablet, Rfl: 0  rosuvastatin (CRESTOR) 10 MG tablet, Take 1 tablet by mouth once daily, Disp: 90 tablet, Rfl: 1  valACYclovir (VALTREX) 500 MG tablet, Take one tablet (500mg) twice daily for  three days for acute viral outbreak.  Start at the earliest sign of an outbreak, do not wait for visible rash (supply is enough for two outbreaks), Disp: 12 tablet, Rfl: 11  [DISCONTINUED] tiZANidine (ZANAFLEX) 2 MG tablet, TAKE 2 TABLETS BY MOUTH EVERY 8 HOURS AS NEEDED FOR PAIN /SPASM, Disp: 30 tablet, Rfl: 2  gabapentin (NEURONTIN) 100 MG capsule, TAKE 2 CAPSULES BY MOUTH IN THE EVENING (Patient not taking: Reported on 12/4/2024), Disp: 60 capsule, Rfl: 3  thiamine 100 MG tablet, Take 1 tablet (100 mg total) by mouth once daily. (Patient not taking: Reported on 12/4/2024), Disp: 30 tablet, Rfl: 5  [DISCONTINUED] tiZANidine (ZANAFLEX) 2 MG tablet, TAKE 2 TABLETS BY MOUTH EVERY 8 HOURS AS NEEDED FOR PAIN /SPASM, Disp: 30 tablet, Rfl: 0    Current Facility-Administered Medications on File Prior to Visit:  lactated ringers infusion, , Intravenous, Continuous, Julián Chiang MD, Last Rate: 25 mL/hr at 03/27/24 1200, New Bag at 03/27/24 1200  LIDOcaine (PF) 10 mg/ml (1%) injection 10 mg, 1 mL, Intradermal, Once, Julián Chinag MD            Review of Systems    Objective:      Physical Exam  Vitals and nursing note reviewed.   Constitutional:       General: She is not in acute distress.     Appearance: Normal appearance. She is normal weight. She is not ill-appearing, toxic-appearing or diaphoretic.      Comments: Good blood pressure control   Initial bradycardia at 52, on repeat 64.  She is hyper thyroid intentionally to suppress cancer growth and is not on a beta-blocker or calcium blocker that would reduce pulse rate.  Good weight for age with a BMI of 26.2 up 1.4 lb from her last visit with me June 20, 2024   Cardiovascular:      Rate and Rhythm: Normal rate and regular rhythm.      Heart sounds: Normal heart sounds.   Pulmonary:      Effort: Pulmonary effort is normal. No respiratory distress.      Breath sounds: Normal breath sounds. No stridor. No wheezing or rales.   Neurological:      Mental Status: She is alert  and oriented to person, place, and time. Mental status is at baseline.   Psychiatric:         Mood and Affect: Mood normal.         Behavior: Behavior normal.         Thought Content: Thought content normal.         Judgment: Judgment normal.         Assessment:       1. Controlled type 2 diabetes mellitus without complication, without long-term current use of insulin    2. Thyroid cancer    3. Postoperative hypothyroidism    4. Hypertension associated with diabetes    5. Hyperlipidemia associated with type 2 diabetes mellitus, baseline     6. Body mass index (BMI) of 26.0 to 26.9 in adult        Plan:       1. Controlled type 2 diabetes mellitus without complication, without long-term current use of insulin (Primary)  Lab Results   Component Value Date    HGBA1C 5.9 07/15/2024     Well controlled, followed by Endocrinology with no scheduled labs.  Maybe following as prediabetes    2. Thyroid cancer  Status post excision and re-excision in May and September 2021.  Evidence distal metastasis on suppressive thyroid dose and followed by Endocrinology    3. Postoperative hypothyroidism  Lab Results   Component Value Date    TSH 0.183 (L) 09/03/2024     See above    4. Hypertension associated with diabetes  Well controlled with no changes needed    5. Hyperlipidemia associated with type 2 diabetes mellitus, baseline   Lab Results   Component Value Date    CHOL 180 04/19/2024    CHOL 154 08/08/2022    CHOL 177 04/06/2022     Lab Results   Component Value Date    HDL 66 04/19/2024    HDL 59 08/08/2022    HDL 70 04/06/2022     Lab Results   Component Value Date    LDLCALC 104.6 04/19/2024    LDLCALC 86.4 08/08/2022    LDLCALC 97.4 04/06/2022     Lab Results   Component Value Date    TRIG 47 04/19/2024    TRIG 43 08/08/2022    TRIG 48 04/06/2022       Lab Results   Component Value Date    CHOLHDL 36.7 04/19/2024    CHOLHDL 38.3 08/08/2022    CHOLHDL 39.5 04/06/2022     Fair control, no changes needed    6. Body  mass index (BMI) of 26.0 to 26.9 in adult  Good weight for age no changes needed

## 2024-12-13 DIAGNOSIS — J30.9 ALLERGIC RHINITIS, UNSPECIFIED SEASONALITY, UNSPECIFIED TRIGGER: ICD-10-CM

## 2024-12-15 DIAGNOSIS — I10 HYPERTENSION, ESSENTIAL: ICD-10-CM

## 2024-12-16 RX ORDER — FLUTICASONE PROPIONATE 50 MCG
1 SPRAY, SUSPENSION (ML) NASAL
Qty: 16 G | Refills: 10 | Status: SHIPPED | OUTPATIENT
Start: 2024-12-16

## 2024-12-17 RX ORDER — LOSARTAN POTASSIUM 100 MG/1
100 TABLET ORAL
Qty: 90 TABLET | Refills: 0 | Status: SHIPPED | OUTPATIENT
Start: 2024-12-17

## 2024-12-19 ENCOUNTER — TELEPHONE (OUTPATIENT)
Dept: FAMILY MEDICINE | Facility: CLINIC | Age: 79
End: 2024-12-19
Payer: MEDICARE

## 2024-12-19 DIAGNOSIS — E11.59 HYPERTENSION ASSOCIATED WITH DIABETES: ICD-10-CM

## 2024-12-19 DIAGNOSIS — I15.2 HYPERTENSION ASSOCIATED WITH DIABETES: ICD-10-CM

## 2024-12-19 RX ORDER — AMLODIPINE BESYLATE 2.5 MG/1
2.5 TABLET ORAL
Qty: 90 TABLET | Refills: 2 | Status: SHIPPED | OUTPATIENT
Start: 2024-12-19

## 2024-12-19 RX ORDER — DOXEPIN HYDROCHLORIDE 25 MG/1
25 CAPSULE ORAL NIGHTLY
Qty: 90 CAPSULE | Refills: 1 | Status: SHIPPED | OUTPATIENT
Start: 2024-12-19

## 2024-12-19 NOTE — TELEPHONE ENCOUNTER
----- Message from Frances sent at 12/19/2024  7:28 AM CST -----  Contact: self  Type:  Needs Medical Advice    Who Called: self  Symptoms (please be specific): pt is wanting dr to send something to phar to help her sleep, pt sts he ordered her something once before but she misplaced it.       [id number] - [place], [place] - [address] [facility].  [address] [place] [address]  [place] [address]  Phone: [phone] Fax: [phone]      Would the patient rather a call back or a response via [facility]? call  Best Call Back Number: [phone] (home)     Additional Information: please advise and thank you.

## 2024-12-19 NOTE — TELEPHONE ENCOUNTER
----- Message from Jun sent at 12/19/2024  4:23 PM CST -----  Contact: self  Type:  Patient Returning Call    Who Called:  PT  Who Left Message for Patient:  Rebeca  Does the patient know what this is regarding?:  yes  Best Call Back Number:  [phone]   Additional Information:

## 2024-12-19 NOTE — TELEPHONE ENCOUNTER
----- Message from Jigna sent at 12/19/2024  3:58 PM CST -----  Regarding: Call back  Type:  Needs Medical Advice    Who Called: Pt    Would the patient rather a call back or a response via [facility]? Call back    Best Call Back Number: [phone]    Additional Information: Pt  is requesting a call back form nurse. Thanks

## 2024-12-20 NOTE — PROGRESS NOTES
Left message to call back.   Did he go to ? Condition update - maybe schedule appointment   Subjective:       Patient ID: Erica Masterson is a 77 y.o. female.    Chief Complaint: Annual Exam (Pt states that she is here for her annual exam )    77-year-old female coming in for annual exam.  She has a history of a multinodular goiter and developed thyroid cancer with initial thyroidectomy in May of 2021 requiring a repeat for residual tissue in September of 2021.  She is being followed by Dr. Eubanks and has developed hypoparathyroidism with mildly low free and total calcium levels.  Her last TSH was euthyroid, not suppressed and Dr. Eubanks has some follow-up labs including a thyroglobulin level ordered in the near future.  She has a history of hereditary and idiopathic peripheral neuropathy, degenerative disc disease of the cervical spine, anxiety and depression, type 2 diabetes based on an elevated A1c of 6.5 in March of 2020.  Prior to that and subsequent to that she has had prediabetic levels.  She has a history of hypertension and hyperlipidemia, peripheral arterial disease, left ventricular hypertrophy, atrial septal aneurysm, PACs, stage IIIA chronic kidney disease, postoperative hypothyroidism, vitamin-D deficiency, diverticulosis, reflux, colon polyps, elevated ALT liver enzymes, osteoarthritis of the knees, chronic low back pain and osteopenia of the lumbar spine.  Her colonoscopy will be due in November of this year and her eye exam will be due with Dr. Michelle in April of this year.  She is due for an A1c in the near future and also a microalbumin and her lipid panel will not be due until August.  She feels well but in September she had a left breast lump with a diagnostic mammogram and ultrasound showing a 5 mm cyst in the lower outer breast and two small lymph nodes in the axilla between three and 6 mm in size.  A repeat diagnostic mammogram and ultrasound was scheduled at six months on the recommendation of Radiology for March.  The patient has subsequently moved it up to January where it was  done this past Monday with no report as yet.    Past Medical History:  3/26/2012: Adenomatous polyp of colon  No date: Allergy  No date: Anxiety  No date: Cancer  No date: Cataract  No date: Colon polyp  04/03/2012: Degenerative arthritis of knee  No date: Diverticulosis  No date: Encounter for blood transfusion  No date: GERD (gastroesophageal reflux disease)  2021: History of thyroid cancer  No date: Hyperlipidemia  No date: Hypertension  5/20/2013: Lateral meniscal tear  03/26/2012: Multinodular goiter  01/18/2010: Myopathy, unspecified  No date: Tuberculosis    Past Surgical History:  2015: BREAST BIOPSY; Left      Comment:  benign  No date: CHOLECYSTECTOMY  12/2/15: COLONOSCOPY      Comment:  Dr. Britton, multiple polyps, recheck five years-three                years if more than 2 polyps are adenomatous  12/2/2015: COLONOSCOPY; N/A  3/20/2019: COLONOSCOPY; N/A      Comment:  Procedure: COLONOSCOPY;  Surgeon: Jose Britton MD;                Location: [facility];  Service: Endoscopy;  Laterality:                N/A;  5/20/2020: COLONOSCOPY; N/A      Comment:  Dr. Britton; internal hemorrhoids; diverticulosis;                polyps removed; repeat in 3 years  8/26/2020: CYSTOSCOPY; N/A      Comment:  Procedure: CYSTOSCOPY;  Surgeon: Charlotte Walters Jr., MD;               Location: [facility];  Service: Urology;  Laterality: N/A;  9/13/2021: DISSECTION OF NECK; Left      Comment:  Procedure: DISSECTION, NECK;  Surgeon: Ryan Torres MD;  Location: [facility] 2ND FLR;  Service: ENT;                 Laterality: Left;  8/22/2022: ESOPHAGEAL MANOMETRY WITH MEASUREMENT OF IMPEDANCE; N/A      Comment:  Procedure: MANOMETRY, ESOPHAGUS, WITH IMPEDANCE                MEASUREMENT;  Surgeon: Pantera Mcguire MD;  Location: [facility]                ENDO (4TH FLR);  Service: Endoscopy;  Laterality: N/A;                 8/4 fully vaccinated; instructions mailed-st  5/20/2020: ESOPHAGOGASTRODUODENOSCOPY; N/A      Comment:   Dr. Britton; small hiatal hernia; gastritis; 8 gastric                polyps removed  4/1/2022: ESOPHAGOGASTRODUODENOSCOPY; N/A      Comment:  Procedure: EGD (ESOPHAGOGASTRODUODENOSCOPY);  Surgeon:                Jose Britton MD;  Location: [facility] ENDO;  Service:                Endoscopy;  Laterality: N/A;  No date: FOOT SURGERY  No date: HYSTERECTOMY      Comment:  TVH/BSO > 20 years  6/4/2020: INTRALUMINAL GASTROINTESTINAL TRACT IMAGING VIA CAPSULE; N/A      Comment:  Procedure: IMAGING PROCEDURE, GI TRACT, INTRALUMINAL,                VIA CAPSULE;  Surgeon: Jose Britton MD;  Location:                [facility] ENDO;  Service: Endoscopy;  Laterality: N/A;  06/06/2013: KNEE SURGERY      Comment:  right knee tear Dr Iverson   9/17/2020: LAPAROSCOPIC CHOLECYSTECTOMY; N/A      Comment:  Procedure: CHOLECYSTECTOMY, LAPAROSCOPIC;  Surgeon:                Forrest Veronica MD;  Location: [facility] OR;  Service:                General;  Laterality: N/A;  1966: LUNG LOBECTOMY      Comment:  right middle lobectomy, due to Tb  No date: OOPHORECTOMY  No date: SHOULDER SURGERY      Comment:  francis   5/19/2021: THYROIDECTOMY; Bilateral      Comment:  Procedure: THYROIDECTOMY;  Surgeon: Jamia Amezquita MD;  [facility];  Service: General;                 Laterality: Bilateral;  9/13/2021: THYROIDECTOMY; Bilateral      Comment:  Procedure: THYROIDECTOMY WITH INTRA-OP PTH;  Surgeon:                Ryan Torres MD;  Location: [facility] OR [facility] FLR;  Service:               ENT;  Laterality: Bilateral;  NIM tube  Intraop PTH    Review of patient's family history indicates:    Social History    Tobacco Use      Smoking status: Never      Smokeless tobacco: Never    Alcohol use: Not Currently      Comment: stopped 2019    Drug use: No    Current Outpatient Medications on File Prior to Visit:  amLODIPine (NORVASC) 2.5 MG tablet, Take 1 tablet (2.5 mg total) by mouth once daily., Disp: 90 tablet, Rfl:  2  artificial tears (ISOPTO TEARS) 0.5 % ophthalmic solution, 1 drop as needed., Disp: , Rfl:   blood sugar diagnostic (BLOOD GLUCOSE TEST) Strp, True Metrix glucose strips check once daily, Disp: 100 each, Rfl: 3  blood-glucose meter kit, True Metrix glucometer check glucose once daily, Disp: 1 each, Rfl: 0  conjugated estrogens (PREMARIN) vaginal cream, Place 0.5 g vaginally once daily AND 0.5 g twice a week., Disp: 30 g, Rfl: 7  coQ10, ubiquinol, 100 mg Cap, Take 100 mg by mouth once daily., Disp: , Rfl:   doxepin (SINEQUAN) 25 MG capsule, Take 25 mg by mouth every evening., Disp: , Rfl:   ergocalciferol (ERGOCALCIFEROL) 50,000 unit Cap, Take 1 capsule (50,000 Units total) by mouth every 30 days., Disp: 3 capsule, Rfl: 3  levothyroxine (SYNTHROID) 75 MCG tablet, Take 1 tablet (75 mcg total) by mouth before breakfast., Disp: 90 tablet, Rfl: 3  rosuvastatin (CRESTOR) 20 MG tablet, TAKE 1/2 (ONE-HALF) TABLET BY MOUTH IN THE EVENING, Disp: 90 tablet, Rfl: 3  [DISCONTINUED] losartan (COZAAR) 100 MG tablet, Take 1 tablet (100 mg total) by mouth once daily., Disp: 90 tablet, Rfl: 1  calcium carbonate (TUMS) 200 mg calcium (500 mg) chewable tablet, Chew and swallow 2 tablets (1,000 mg total) by mouth 3 (three) times daily. for 14 days (Patient not taking: Reported on 1/25/2023), Disp: 100 tablet, Rfl: 0  gabapentin (NEURONTIN) 300 MG capsule, Take 300 mg by mouth 3 (three) times daily., Disp: , Rfl:   [DISCONTINUED] baclofen (LIORESAL) 10 MG tablet, Take 1 tablet (10 mg total) by mouth 3 (three) times daily. Start w. Half a tab first 7 days (Patient not taking: Reported on 9/8/2022), Disp: 90 tablet, Rfl: 6  [DISCONTINUED] diclofenac-misoprostol  mg-mcg (ARTHROTEC 50)  mg-mcg per tablet, Take 1 tablet by mouth 2 (two) times daily. (Patient not taking: Reported on 1/25/2023), Disp: 90 tablet, Rfl: 3  [DISCONTINUED] DULoxetine (CYMBALTA) 30 MG capsule, Take 1 capsule (30 mg total) by mouth once daily. (Patient  not taking: Reported on 1/25/2023), Disp: 30 capsule, Rfl: 11  [DISCONTINUED] DULoxetine (CYMBALTA) 30 MG capsule, Take 1 capsule (30 mg total) by mouth once daily. (Patient not taking: Reported on 1/25/2023), Disp: 30 capsule, Rfl: 11    No current facility-administered medications on file prior to visit.        Review of Systems   Constitutional:  Negative for chills, diaphoresis, fatigue, fever and unexpected weight change.        Nontender left breast lump be low and lateral to the nipple.  No overlying skin changes, no nipple drainage or bleeding and no tenderness   HENT:  Positive for postnasal drip. Negative for congestion and sinus pressure.    Eyes:  Negative for itching and visual disturbance.   Respiratory:  Negative for cough, chest tightness, shortness of breath and wheezing.    Cardiovascular:  Negative for chest pain, palpitations and leg swelling.   Gastrointestinal:  Negative for abdominal pain, blood in stool, constipation, diarrhea, nausea and vomiting.   Genitourinary:  Negative for dysuria, frequency and hematuria.   Musculoskeletal:  Negative for arthralgias, back pain, joint swelling and myalgias.   Neurological:  Negative for dizziness and headaches.   Hematological:  Negative for adenopathy.   Psychiatric/Behavioral:  Negative for sleep disturbance. The patient is not nervous/anxious.      Objective:      Physical Exam  Vitals and nursing note reviewed.   Constitutional:       General: She is not in acute distress.     Appearance: Normal appearance. She is well-developed and normal weight. She is not ill-appearing, toxic-appearing or diaphoretic.      Comments: Good blood pressure control  Normal weight with a BMI of 25.5 she is up 10 lb from her last physical January 24, 2022   HENT:      Head: Normocephalic and atraumatic.      Right Ear: Tympanic membrane, ear canal and external ear normal. There is no impacted cerumen.      Left Ear: Tympanic membrane, ear canal and external ear normal.  There is no impacted cerumen.      Nose: Nose normal. No congestion or rhinorrhea.      Mouth/Throat:      Mouth: Mucous membranes are moist.      Pharynx: Oropharynx is clear. No oropharyngeal exudate or posterior oropharyngeal erythema.   Eyes:      General: No scleral icterus.        Right eye: No discharge.         Left eye: No discharge.      Conjunctiva/sclera: Conjunctivae normal.      Pupils: Pupils are equal, round, and reactive to light.   Neck:      Thyroid: No thyromegaly.      Vascular: No carotid bruit or JVD.   Cardiovascular:      Rate and Rhythm: Normal rate and regular rhythm.      Heart sounds: Normal heart sounds. No murmur heard.    No friction rub. No gallop.   Pulmonary:      Effort: Pulmonary effort is normal. No respiratory distress.      Breath sounds: Normal breath sounds. No stridor. No wheezing, rhonchi or rales.   Chest:      Chest wall: No tenderness.       Abdominal:      General: Bowel sounds are normal. There is no distension.      Palpations: Abdomen is soft. There is no mass.      Tenderness: There is no abdominal tenderness. There is no guarding or rebound.      Hernia: No hernia is present.   Musculoskeletal:         General: No swelling, tenderness, deformity or signs of injury. Normal range of motion.      Cervical back: Normal range of motion and neck supple. No rigidity or tenderness.      Right lower leg: No edema.      Left lower leg: No edema.   Lymphadenopathy:      Cervical: No cervical adenopathy.   Skin:     General: Skin is warm and dry.      Coloration: Skin is not jaundiced or pale.      Findings: No bruising, erythema, lesion or rash.   Neurological:      General: No focal deficit present.      Mental Status: She is alert and oriented to person, place, and time. Mental status is at baseline.      Cranial Nerves: No cranial nerve deficit.      Sensory: No sensory deficit.      Motor: No weakness.      Coordination: Coordination normal.      Gait: Gait normal.       Deep Tendon Reflexes: Reflexes are normal and symmetric. Reflexes normal.   Psychiatric:         Mood and Affect: Mood normal.         Behavior: Behavior normal.         Thought Content: Thought content normal.         Judgment: Judgment normal.       Assessment:       1. Encounter for preventive health examination    2. Thyroid cancer    3. Metastasis from thyroid cancer    4. Postoperative hypothyroidism    5. Type 2 diabetes mellitus with hyperosmolarity without coma, without long-term current use of insulin    6. Hypertension associated with diabetes    7. Hypertensive left ventricular hypertrophy, without heart failure    8. Hypertension, essential    9. Hereditary and idiopathic peripheral neuropathy    10. RONNIE (generalized anxiety disorder), 2019    11. Reactive depression    12. PAD (peripheral artery disease)    13. Hyperlipidemia associated with type 2 diabetes mellitus, baseline     14. Hypocalcemia    15. Stage 3a chronic kidney disease    16. Iron deficiency anemia, unspecified iron deficiency anemia type    17. BMI 25.0-25.9,adult          Plan:       1. Encounter for preventive health examination    2. Thyroid cancer  Followed by Dr. Eubanks.  Last TSH was not suppressed but follow-up is scheduled with a thyroglobulin level in May    3. Metastasis from thyroid cancer  Status post 2nd thyroidectomy    4. Postoperative hypothyroidism  Postoperative, followed by Endocrinology    5. Type 2 diabetes mellitus with hyperosmolarity without coma, without long-term current use of insulin  Prediabetic levels with a single A1c of 6.5 in March of 2020.  Patient is on diet and exercise control without medications  - Hemoglobin A1C; Future  - Microalbumin/Creatinine Ratio, Urine; Future    6. Hypertension associated with diabetes  Well controlled no changes needed    7. Hypertensive left ventricular hypertrophy, without heart failure  Asymptomatic    8. Hypertension, essential  See above  - losartan (COZAAR)  100 MG tablet; Take 1 tablet (100 mg total) by mouth once daily.  Dispense: 90 tablet; Refill: 3    9. Hereditary and idiopathic peripheral neuropathy  Minimally symptomatic    10. RONNIE (generalized anxiety disorder), 2019  Stable and asymptomatic at this time.  Tends to revolve around her thyroid cancer diagnosis    11. Reactive depression  Stable and asymptomatic at this time    12. PAD (peripheral artery disease)  Asymptomatic    13. Hyperlipidemia associated with type 2 diabetes mellitus, baseline   Lipid panel to be done in August    14. Hypocalcemia  Mildly low with low ionized calcium levels as well followed by Endocrinology    15. Stage 3a chronic kidney disease   BMP  Lab Results   Component Value Date     01/12/2023    K 4.5 01/12/2023     01/12/2023    CO2 29 01/12/2023    BUN 14 01/12/2023    CREATININE 0.9 01/12/2023    CALCIUM 8.9 01/12/2023    ANIONGAP 7 (L) 01/12/2023    EGFRNORACEVR >60.0 01/12/2023     Stable/resolved    16. Iron deficiency anemia, unspecified iron deficiency anemia type  Lab Results   Component Value Date    WBC 4.50 11/09/2022    HGB 12.2 11/09/2022    HCT 39.0 11/09/2022    MCV 92 11/09/2022     11/09/2022       Appears resolved    17. BMI 25.0-25.9,adult  Good weight no changes needed

## 2024-12-22 DIAGNOSIS — I10 HYPERTENSION, ESSENTIAL: ICD-10-CM

## 2024-12-26 RX ORDER — LOSARTAN POTASSIUM 100 MG/1
100 TABLET ORAL
Qty: 90 TABLET | Refills: 0 | Status: SHIPPED | OUTPATIENT
Start: 2024-12-26

## 2025-01-02 ENCOUNTER — TELEPHONE (OUTPATIENT)
Dept: FAMILY MEDICINE | Facility: CLINIC | Age: 80
End: 2025-01-02
Payer: MEDICARE

## 2025-01-02 NOTE — TELEPHONE ENCOUNTER
----- Message from Mariza sent at 1/2/2025  3:05 PM CST -----  Contact: pt [phone]  Type: Needs Medical Advice  Who Called:  Pt     Best Call Back Number: [phone]    Additional Information: Pt stated she is requesting an rx for anxiety. Pt did not want to schedule appt at this time. Pls call back and advise  Pt stated she got Dr Rivera's name from Dr Myers's office.

## 2025-01-03 ENCOUNTER — HOSPITAL ENCOUNTER (OUTPATIENT)
Dept: RADIOLOGY | Facility: HOSPITAL | Age: 80
Discharge: HOME | End: 2025-01-03
Attending: INTERNAL MEDICINE
Payer: MEDICARE

## 2025-01-03 DIAGNOSIS — E89.0 POSTOPERATIVE HYPOTHYROIDISM: ICD-10-CM

## 2025-01-03 DIAGNOSIS — C73 THYROID CANCER: ICD-10-CM

## 2025-01-03 PROCEDURE — 76536 US EXAM OF HEAD AND NECK: CPT | Mod: TC

## 2025-01-03 PROCEDURE — 76536 US EXAM OF HEAD AND NECK: CPT | Mod: 26,,, | Performed by: RADIOLOGY

## 2025-01-10 ENCOUNTER — OFFICE VISIT (OUTPATIENT)
Dept: ENDOCRINOLOGY | Facility: CLINIC | Age: 80
End: 2025-01-10
Payer: MEDICARE

## 2025-01-10 VITALS
WEIGHT: 170.88 LBS | BODY MASS INDEX: 27.46 KG/M2 | DIASTOLIC BLOOD PRESSURE: 72 MMHG | OXYGEN SATURATION: 97 % | HEART RATE: 61 BPM | SYSTOLIC BLOOD PRESSURE: 120 MMHG | HEIGHT: 66 IN

## 2025-01-10 DIAGNOSIS — E89.0 POSTOPERATIVE HYPOTHYROIDISM: ICD-10-CM

## 2025-01-10 DIAGNOSIS — C73 THYROID CANCER: Primary | ICD-10-CM

## 2025-01-10 DIAGNOSIS — M85.80 OSTEOPENIA, UNSPECIFIED LOCATION: ICD-10-CM

## 2025-01-10 PROBLEM — E11.9 CONTROLLED TYPE 2 DIABETES MELLITUS WITHOUT COMPLICATION, WITHOUT LONG-TERM CURRENT USE OF INSULIN: Status: RESOLVED | Noted: 2020-03-24 | Resolved: 2025-01-10

## 2025-01-10 PROCEDURE — 99999 PR PBB SHADOW E&M-EST. PATIENT-LVL IV: CPT | Mod: PBBFAC,,, | Performed by: INTERNAL MEDICINE

## 2025-01-10 NOTE — PROGRESS NOTES
"  CHIEF COMPLAINT:  Papillary thyroid cancer  79 y.o. old being seen as a f/u.  Patient had FNA of a left nodule that showed papillary thyroid cancer.  Subsequently had a total thyroidectomy in May of 2021.  She had a postoperative thyroglobulin that was high at 91.  Had a repeat surgery in September of 2021.  For 2nd surgery-  Lymph node positive on left level 2A, left level 3, left level 4 and paratracheal lymph node. Then treated with radioactive iodine-168 mCi in December of 2021.  Posttreatment whole-body scan showed activity in the neck.  She had a negative PET scan in June of 2023.    On synthroid 88 mcg daily. No palpitations. No tremors. No diarrhea or constipation. No dysphagia. NO heat/cold intolerance. No anxiety. No insomnia. She does take Ca + D. Exercises 3/week- at [place]. No falls. No fractures.         4/29/21  1. FNA Thyroid, Left mid lat:   [facility] Thyroid Cytology Category: Malignant  Papillary thyroid   carcinoma.   Other findings and comments:   Macrophages.   2. FNA Thyroid, Left mid med:   [facility] Thyroid Cytology Category: Benign   Other findings and comments:   Macrophages.     Total thyroidectomy on May 19, 2021  SYNOPTIC REPORT  Procedure - Total thyroidectomy  Lymph node sampling ¿ Not performed  Tumor focality ¿ Multifocal  Tumor laterality ¿ Left lobe  Tumor size ¿ PTC, classic variant - 2.0 cm; 4 separate foci of PTC,  follicular variant range in size from 2-5mm  Histologic type - Papillary carcinoma, classic and follicular variant  Margins ¿ Positive for PTC, classic variant, multifocal (posterior aspect)  Angioinvasion ¿ Not identified  Lymphatic invasion ¿ Not identified  Extrathyroidal extension - Not identified (cannot be adequately evaluated due  to margin positivity)  Pathologic staging  ¿ pT1 Nx Mx     Completion surgery on 09/13/2021    1. Skin and soft tissue, "scar tissue neck", excision:  - Benign skin and soft tissue with surgical site changes  2. " "Lymph node, left neck, level 5, excision:  - Two lymph nodes negative for malignancy (0/2)  3. 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 Lymph node, left neck, level 4, excision:  - Two of fourteen lymph nodes positive for metastatic carcinoma (2/14)  6. Soft tissue, "tissue by straps", excision:  - Benign fibrovascular adipose tissue and skeletal muscle with surgical site  changes  - Negative for malignancy  7. "Completion thyroidectomy and paratracheal lymph node dissection",  excision:  - One of two lymph nodes positive for metastatic papillary thyroid carcinoma  (1/2)  - Fragments of skeletal muscle with surgical site changes  - Fragments of unremarkable thymic tissue  - No definitive thyroid parenchyma identified         PAST MEDICAL HISTORY/PAST SURGICAL HISTORY:  Reviewed in [facility]    SOCIAL HISTORY: Review[facility] in [facility]    FAMILY HISTORY:  Mother had unknown thyroid condition. No known thyroid cancer. + DM 2.     MEDICATIONS/ALLERGIES: The patient's MedCard has been updated and reviewed.            PE:    GENERAL: Well developed, well nourished.  NECK: Supple, trachea midline, no palpable thyroid nodules  CHEST: Resp even and unlabored, CTA bilateral.  CARDIAC: RRR, S1, S2 heard, no murmurs, rubs, S3, or S4    LABS/Radiology     Latest Reference Range & Units 01/03/25 08:11   TSH 0.400 - 4.000 uIU/mL 0.040 (L)   Free T4 0.71 - 1.51 ng/dL 1.31   Thyroglobulin Interpretation  SEE BELOW   Thyroglobulin Antibody Screen <1.8 IU/mL <1.8   Thyroglobulin, Tumor Marker ng/mL 7.9 (H)   (L): Data is abnormally low  (H): Data is abnormally high    US SOFT TISSUE HEAD NECK     CLINICAL HISTORY:  Malignant neoplasm of thyroid gland     TECHNIQUE:  Ultrasound of the thyroid and cervical lymph nodes was " performed.     COMPARISON:  06/12/2024     FINDINGS:  There is history of thyroidectomy.  There is no tissue along the operative bed.  There is a normal sized right R3 lymph node which is 0.3 cm short axis diameter.  There is a normal size left L2 lymph node which is 0.5 cm short axis diameter.     Impression:     Postsurgical change of the thyroid.  No abnormal enlarged cervical lymph nodes.  No detrimental change.      ASSESSMENT/PLAN:  1. Papillary thyroid cancer-initially had surgery in May of 2021.  Postoperative thyroglobulin was 91.  Therefore had a repeat surgery in September of 2021.  Subsequently had 168 mCi of radioactive iodine.  Posttreatment whole-body scan showed activity in the thyroid bed.  Since then her thyroglobulin has been detectable.  1 time her TSH increased as well as her thyroglobulin.  Had a PET scan which was negative.  Her TSH has been detectable at approximately 7.  Does not appear to be going up.  Ultrasound does not show any concerning lesions.  I did discuss with the patient that there could be microscopic residual disease.  Nothing visible on testing.  We will need to keep TSH suppressed.  Continue to monitor thyroglobulin.  If increases, we will need to assess for either disease that can be surgically removed versus an empiric dose of radioactive iodine.    2.  Hypothyroidism-she does appear to be taking her thyroid medication appropriately.  We will need to suppress her TSH because of detectable thyroglobulin.  Discussed hyperthyroid symptoms to monitor for.  If thyroglobulin increases any further, may need to get TSH closer to 0.1.    3.  Osteopenia-discussed suppressed TSH can worsen osteopenia.  She is due for  another bone density in November of 2025.  She is taking calcium and vitamin-D.  She is doing weight-bearing exercise.  No fracture history.      FOLLOWUP  F/U 6 months with TSH, Tg

## 2025-01-23 NOTE — PROGRESS NOTES
[facility]  Office Visit Note     SUBJECTIVE:   HPI: Erica Masterson  is a 79 y.o. female here to [facility] care     Medical conditions:   Peripheral neuropathy, depression, anxiety, hypertensive LVH, stage 3a CKD, thyroid cancer, post-op hypothyroidism, GERD    Meds:  Zanaflex, rosuvastatin 10 mg, losartan 100 mg, levothyroxine 88 mcg, gabapentin 200 mg qHS, doxepin 25 mg nightly, donepazil, valium 2 mg nightly PRN, amlodipine 2.5 mg     Specialists:   Endo (Dr. Daugherty)   Last appointment 1/10/2025 with labs on 1/3/2025  Below is the notes from Dr. Daugherty     Papillary thyroid cancer   Patient had FNA of a left nodule that showed papillary thyroid cancer.  Subsequently had a total thyroidectomy in May of 2021.  She had a postoperative thyroglobulin that was high at 91.  Had a repeat surgery in September of 2021.  For 2nd surgery-  Lymph node positive on left level 2A, left level 3, left level 4 and paratracheal lymph node. Then treated with radioactive iodine-168 mCi in December of 2021.  Posttreatment whole-body scan showed activity in the neck.  She had a negative PET scan in June of 2023.     On synthroid 88 mcg daily. No palpitations. No tremors. No diarrhea or constipation. No dysphagia. NO heat/cold intolerance. No anxiety. No insomnia. She does take Ca + D. [facility] No falls. No fractures.     2.  Hypothyroidism-she does appear to be taking her thyroid medication appropriately.  We will need to suppress her TSH because of detectable thyroglobulin.  Discussed hyperthyroid symptoms to monitor for.  If thyroglobulin increases any further, may need to get TSH closer to 0.1.     3.  Osteopenia-discussed suppressed TSH can worsen osteopenia.  She is due for  another bone density in November of 2025.  She is taking calcium and vitamin-D.  She is doing weight-bearing exercise.  No fracture history.     History of Present Illness    CHIEF COMPLAINT:  Patient presents today to  est care     CURRENT SYMPTOMS:  She experiences intermittent itchiness on the left rib cage without visible rash or redness. She manages the discomfort by keeping the area dry with tissue under her bra and avoiding tight clothing. She suspects perspiration may be contributing to the issue.    THYROID CANCER HISTORY:  She has a history of papillary thyroid cancer treated with total thyroidectomy in May 2021, followed by additional surgery for lymph node involvement. She completed radiation therapy in 2021. PET scan in summer 2023 showed no evidence of disease. 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    SURGICAL HISTORY:  Her surgical history includes multiple colonoscopies, cholecystectomy, D&C, EGD, hysterectomy, and lung lobectomy    MEDICATIONS:  Current medications include Levothyroxine 88 mcg early morning on empty stomach, Amlodipine 2.5 mg and Losartan for blood pressure, Donepezil for memory, Rosuvastatin 10 mg for cholesterol, Tizanidine for muscle spasms as needed in evening, and Doxepin for sleep.    ANXIETY:  She manages anxiety through deep breathing and relaxation techniques without medications. Symptoms occur when tired or agitated but are not daily.    FAMILY HISTORY:  Family history is significant for brother with stomach cancer, sister with breast cancer, and mother with thyroid cancer.    OBSTETRIC HISTORY:   with two full-term vaginal deliveries.         Lab Visit on 2025   Component Date Value Ref Range Status    TSH 2025 0.040 (L)  0.400 - 4.000 uIU/mL Final    Thyroglobulin, Tumor Marker 2025 7.9 (H)  ng/mL Final    Thyroglobulin Antibody Screen 2025 <1.8  <1.8 IU/mL Final    Thyroglobulin Interpretation 2025 SEE BELOW   Final    Free T4 2025 1.31  0.71 - 1.51 ng/dL Final   Lab Visit on 2024   Component Date Value Ref Range  Status    TSH 09/03/2024 0.183 (L)  0.400 - 4.000 uIU/mL Final    Free T4 09/03/2024 1.34  0.71 - 1.51 ng/dL Final         Current Outpatient Medications on File Prior to Visit   Medication Sig Dispense Refill    amLODIPine (NORVASC) 2.5 MG tablet Take 1 tablet by mouth once daily for blood pressure 90 tablet 2    artificial tears (ISOPTO TEARS) 0.5 % ophthalmic solution 1 drop as needed.      blood sugar diagnostic (BLOOD GLUCOSE TEST) Strp True Metrix glucose strips check once daily 100 each 3    co-enzyme Q-10 30 mg capsule Take 30 mg by mouth once daily.      donepeziL (ARICEPT) 10 MG tablet TAKE 1 TABLET BY MOUTH ONCE DAILY IN THE EVENING 30 tablet 5    doxepin (SINEQUAN) 25 MG capsule Take 1 capsule (25 mg total) by mouth every evening. 90 capsule 1    ergocalciferol (ERGOCALCIFEROL) 50,000 unit Cap Take one capsule twice monthly. 6 capsule 3    fluticasone propionate (FLONASE) 50 mcg/actuation nasal spray Use 1 spray(s) in each nostril once daily 16 g 10    ibuprofen (ADVIL,MOTRIN) 600 MG tablet Take 1 tablet (600 mg total) by mouth every 6 (six) hours as needed for Pain. 30 tablet 0    levothyroxine (SYNTHROID) 88 MCG tablet Take 1 tablet (88 mcg total) by mouth before breakfast. 30 tablet 11    loratadine (CLARITIN) 10 mg tablet Take 1 tablet (10 mg total) by mouth once daily. 30 tablet 5    losartan (COZAAR) 100 MG tablet Take 1 tablet by mouth once daily 90 tablet 0    rosuvastatin (CRESTOR) 10 MG tablet Take 1 tablet by mouth once daily 90 tablet 1    thiamine 100 MG tablet Take 1 tablet (100 mg total) by mouth once daily. 30 tablet 5    tiZANidine (ZANAFLEX) 2 MG tablet TAKE 2 TABLETS BY MOUTH EVERY 8 HOURS AS NEEDED FOR PAIN /SPASM 30 tablet 2    blood-glucose meter kit True Metrix glucometer check glucose once daily 1 each 0    diazePAM (VALIUM) 2 MG tablet Take 1 tablet (2 mg total) by mouth nightly as needed (muscle spasm). (Patient not taking: Reported on 1/24/2025) 7 tablet 0    [DISCONTINUED]  gabapentin (NEURONTIN) 100 MG capsule TAKE 2 CAPSULES BY MOUTH IN THE EVENING (Patient not taking: Reported on 1/24/2025) 60 capsule 3    [DISCONTINUED] valACYclovir (VALTREX) 500 MG tablet Take one tablet (500mg) twice daily for three days for acute viral outbreak.  Start at the earliest sign of an outbreak, do not wait for visible rash (supply is enough for two outbreaks) (Patient not taking: Reported on 1/24/2025) 12 tablet 11     Current Facility-Administered Medications on File Prior to Visit   Medication Dose Route Frequency Provider Last Rate Last Admin    lactated ringers infusion   Intravenous Continuous Julián Chiang MD 25 mL/hr at 03/27/24 1200 New Bag at 03/27/24 1200    LIDOcaine (PF) 10 mg/ml (1%) injection 10 mg  1 mL Intradermal Once Julián Chiang MD         Past Medical History:   Diagnosis Date    Adenomatous polyp of colon 3/26/2012    Allergy     Anxiety     Cancer     Cataract     Colon polyp     Degenerative arthritis of knee 04/03/2012    Diverticulosis     Encounter for blood transfusion     GERD (gastroesophageal reflux disease)     History of thyroid cancer 2021    Hyperlipidemia     Hypertension     Lateral meniscal tear 5/20/2013    Multinodular goiter 03/26/2012    Myopathy, unspecified 01/18/2010    Tuberculosis      Past Surgical History:   Procedure Laterality Date    BREAST BIOPSY Left 2015    benign    CHOLECYSTECTOMY      COLONOSCOPY  12/02/2015    Dr. Britton, multiple polyps, recheck five years-three years if more than 2 polyps are adenomatous    COLONOSCOPY N/A 12/02/2015    COLONOSCOPY N/A 03/20/2019    Procedure: COLONOSCOPY;  Surgeon: Jose Britton MD;  Location: [facility];  Service: Endoscopy;  Laterality: N/A;    COLONOSCOPY N/A 05/20/2020    Dr. Britton; internal hemorrhoids; diverticulosis; polyps removed; repeat in 3 years    COLONOSCOPY N/A 11/16/2023    Procedure: COLONOSCOPY(instructions mailed 11/9);  Surgeon: Jose Britton MD;  Location: [facility];  Service:  Endoscopy;  Laterality: N/A;    CYSTOSCOPY N/A 08/26/2020    Procedure: CYSTOSCOPY;  Surgeon: Charlotte Walters Jr., MD;  Location: [facility] OR;  Service: Urology;  Laterality: N/A;    DILATION AND CURETTAGE OF UTERUS      DISSECTION OF NECK Left 09/13/2021    Procedure: DISSECTION, NECK;  Surgeon: Ryan Torres MD;  Location: [facility] 2ND FLR;  Service: ENT;  Laterality: Left;    EPIDURAL STEROID INJECTION INTO CERVICAL SPINE N/A 3/27/2024    Procedure: Injection-steroid-epidural-cervical;  Surgeon: Julián Chiang MD;  Location: [facility] OR;  Service: Anesthesiology;  Laterality: N/A;  c7-T1    EPIDURAL STEROID INJECTION INTO LUMBAR SPINE N/A 7/20/2023    Procedure: Injection-steroid-epidural-lumbar;  Surgeon: Julián Chiang MD;  Location: [facility] OR;  Service: Pain Management;  Laterality: N/A;  L5-s1    ESOPHAGEAL MANOMETRY WITH MEASUREMENT OF IMPEDANCE N/A 08/22/2022    Procedure: MANOMETRY, ESOPHAGUS, WITH IMPEDANCE MEASUREMENT;  Surgeon: Pantera Mcguire MD;  Location: [facility] (4TH FLR);  Service: Endoscopy;  Laterality: N/A;  8/4 fully vaccinated; instructions mailed-st    ESOPHAGOGASTRODUODENOSCOPY N/A 05/20/2020    Dr. Britton; small hiatal hernia; gastritis; 8 gastric polyps removed    ESOPHAGOGASTRODUODENOSCOPY N/A 04/01/2022    Procedure: EGD (ESOPHAGOGASTRODUODENOSCOPY);  Surgeon: Jose Britton MD;  Location: [facility];  Service: Endoscopy;  Laterality: N/A;    FOOT SURGERY      HYSTERECTOMY      TVH/BSO > 20 years    INCISION OF VULVA Bilateral 8/4/2023    Procedure: EXCISION, CYST, LABIA AND OTHER INDICATED PROCEDURES;  Surgeon: Mat Beach MD;  Location: [facility];  Service: OB/GYN;  Laterality: Bilateral;  EXCYSION OF VULVAR CYST    INJECTION, SACROILIAC JOINT Right 12/1/2023    Procedure: INJECTION,SACROILIAC JOINT;  Surgeon: Julián Chiang MD;  Location: [facility] OR;  Service: Anesthesiology;  Laterality: Right;  sacroiliac injection    INTRALUMINAL GASTROINTESTINAL TRACT IMAGING VIA CAPSULE N/A 06/04/2020     Procedure: IMAGING PROCEDURE, GI TRACT, INTRALUMINAL, VIA CAPSULE;  Surgeon: Jose Britton MD;  Location: [facility];  Service: Endoscopy;  Laterality: N/A;    KNEE SURGERY  06/06/2013    right knee tear Dr Iverson     LAPAROSCOPIC CHOLECYSTECTOMY N/A 09/17/2020    Procedure: CHOLECYSTECTOMY, LAPAROSCOPIC;  Surgeon: Forrest Veronica MD;  Location: [facility];  Service: General;  Laterality: N/A;    LUNG LOBECTOMY  1966    right middle lobectomy, due to Tb    OOPHORECTOMY      SHOULDER SURGERY      francis     THYROIDECTOMY Bilateral 05/19/2021    Procedure: THYROIDECTOMY;  Surgeon: Jamia Amezquita MD;  [facility];  Service: General;  Laterality: Bilateral;    THYROIDECTOMY Bilateral 09/13/2021    Procedure: THYROIDECTOMY WITH INTRA-OP PTH;  Surgeon: Ryan Torres MD;  [facility];  Service: ENT;  Laterality: Bilateral;  NIM tube  Intraop PTH     Past Surgical History:   Procedure Laterality Date    BREAST BIOPSY Left 2015    benign    CHOLECYSTECTOMY      COLONOSCOPY  12/02/2015    Dr. Britton, multiple polyps, recheck five years-three years if more than 2 polyps are adenomatous    COLONOSCOPY N/A 12/02/2015    COLONOSCOPY N/A 03/20/2019    Procedure: COLONOSCOPY;  Surgeon: Jose Britton MD;  Location: [facility];  Service: Endoscopy;  Laterality: N/A;    COLONOSCOPY N/A 05/20/2020    Dr. Britton; internal hemorrhoids; diverticulosis; polyps removed; repeat in 3 years    COLONOSCOPY N/A 11/16/2023    Procedure: COLONOSCOPY(instructions mailed 11/9);  Surgeon: Jose Britton MD;  Location: [facility];  Service: Endoscopy;  Laterality: N/A;    CYSTOSCOPY N/A 08/26/2020    Procedure: CYSTOSCOPY;  Surgeon: Charlotte Walters Jr., MD;  Location: [facility] OR;  Service: Urology;  Laterality: N/A;    DILATION AND CURETTAGE OF UTERUS      DISSECTION OF NECK Left 09/13/2021    Procedure: DISSECTION, NECK;  Surgeon: Ryan Torres MD;  [facility];  Service: ENT;  Laterality:  Left;    EPIDURAL STEROID INJECTION INTO CERVICAL SPINE N/A 3/27/2024    Procedure: Injection-steroid-epidural-cervical;  Surgeon: Julián Chiang MD;  Location: [facility] OR;  Service: Anesthesiology;  Laterality: N/A;  c7-T1    EPIDURAL STEROID INJECTION INTO LUMBAR SPINE N/A 7/20/2023    Procedure: Injection-steroid-epidural-lumbar;  Surgeon: Julián Chiang MD;  [facility];  Service: Pain Management;  Laterality: N/A;  L5-s1    ESOPHAGEAL MANOMETRY WITH MEASUREMENT OF IMPEDANCE N/A 08/22/2022    Procedure: MANOMETRY, ESOPHAGUS, WITH IMPEDANCE MEASUREMENT;  Surgeon: Pantera Mcguire MD;  [facility]  Service: Endoscopy;  Laterality: N/A;  8/4 fully vaccinated; instructions mailed-st    ESOPHAGOGASTRODUODENOSCOPY N/A 05/20/2020    Dr. Britton; small hiatal hernia; gastritis; 8 gastric polyps removed    ESOPHAGOGASTRODUODENOSCOPY N/A 04/01/2022    Procedure: EGD (ESOPHAGOGASTRODUODENOSCOPY);  Surgeon: Jose Britton MD;  Location: [facility];  Service: Endoscopy;  Laterality: N/A;    FOOT SURGERY      HYSTERECTOMY      TVH/BSO > 20 years    INCISION OF VULVA Bilateral 8/4/2023    Procedure: EXCISION, CYST, LABIA AND OTHER INDICATED PROCEDURES;  Surgeon: Mat Beach MD;  Location: [facility];  Service: OB/GYN;  Laterality: Bilateral;  EXCYSION OF VULVAR CYST    INJECTION, SACROILIAC JOINT Right 12/1/2023    Procedure: INJECTION,SACROILIAC JOINT;  Surgeon: Julián Chiang MD;  Location: [facility] OR;  Service: Anesthesiology;  Laterality: Right;  sacroiliac injection    INTRALUMINAL GASTROINTESTINAL TRACT IMAGING VIA CAPSULE N/A 06/04/2020    Procedure: IMAGING PROCEDURE, GI TRACT, INTRALUMINAL, VIA CAPSULE;  Surgeon: Jose Britton MD;  Location: [facility];  Service: Endoscopy;  Laterality: N/A;    KNEE SURGERY  06/06/2013    right knee tear Dr Iverson     LAPAROSCOPIC CHOLECYSTECTOMY N/A 09/17/2020    Procedure: CHOLECYSTECTOMY, LAPAROSCOPIC;  Surgeon: Forrest Veronica MD;  Location: [facility];   Service: General;  Laterality: N/A;    LUNG LOBECTOMY  1966    right middle lobectomy, due to Tb    OOPHORECTOMY      SHOULDER SURGERY      francis     THYROIDECTOMY Bilateral 05/19/2021    Procedure: THYROIDECTOMY;  Surgeon: Jamia Amezquita MD;  Location: [facility] OR [address] [place] [address];  Service: General;  Laterality: Bilateral;    THYROIDECTOMY Bilateral 09/13/2021    Procedure: THYROIDECTOMY WITH INTRA-OP PTH;  Surgeon: Ryan Torres MD;  Location: [facility] OR [address] [place] [address];  Service: ENT;  Laterality: Bilateral;  NIM tube  Intraop PTH     Family History   Problem Relation Name Age of Onset    Cancer Mother          thyroid    Hypertension Mother      Hyperlipidemia Mother      Hypertension Father      Emphysema Father      Dementia Sister      Alzheimer's disease Sister      Breast cancer Sister  60    Cancer Brother          stomach    Cancer Brother      No Known Problems Daughter      Diabetes Son      Hypertension Son      Alcohol abuse Son      Diabetes Maternal Aunt      Alzheimer's disease Maternal Uncle      Obesity Paternal Uncle      Cancer Maternal Grandmother      Colon cancer Other nephew     Prostate cancer Other nephew     Melanoma Neg Hx      Psoriasis Neg Hx      Lupus Neg Hx      Eczema Neg Hx      Amblyopia Neg Hx      Blindness Neg Hx      Cataracts Neg Hx      Glaucoma Neg Hx      Macular degeneration Neg Hx      Retinal detachment Neg Hx      Strabismus Neg Hx      Stroke Neg Hx      Thyroid cancer Neg Hx      Colon polyps Neg Hx      Ulcerative colitis Neg Hx      Stomach cancer Neg Hx      Rectal cancer Neg Hx         Marital Status:   Alcohol History:  reports that she does not currently use alcohol.  Tobacco History:  reports that she has never smoked. She has never used smokeless tobacco.  Drug History:  reports no history of drug use.    Health Maintenance Topics with due status: Not Due       Topic Last Completion Date    TETANUS VACCINE 05/28/2023    DEXA Scan 11/01/2023    Colonoscopy 11/16/2023     Diabetes Urine Screening 04/19/2024    Lipid Panel 04/19/2024    Diabetic Eye Exam 08/13/2024     Immunization History   Administered Date(s) Administered    COVID-19, MRNA, LN-S, PF (MODERNA FULL 0.5 ML DOSE) 10/14/2023    COVID-19, MRNA, LN-S, PF (Pfizer) (Gray Cap) 04/19/2022    COVID-19, MRNA, LN-S, PF (Pfizer) (Purple Cap) 01/16/2021, 02/06/2021, 10/12/2021    Influenza (FLUAD) - Quadrivalent - Adjuvanted - PF *Preferred* (65+) 10/20/2021, 11/01/2022    Influenza - Quadrivalent - High Dose - PF (65 years and older) 10/01/2020, 10/01/2020, 08/31/2023    Influenza - Trivalent - Afluria, Fluzone MDV 09/14/2009, 10/30/2010, 10/03/2011    Influenza - Trivalent - Fluzone High Dose - PF (65 years and older) 09/14/2012, 10/10/2013, 10/13/2014, 10/26/2016, 10/11/2017, 10/15/2018, 10/01/2019, 10/01/2019, 10/01/2020    Pneumococcal Conjugate - 13 Valent 08/29/2011, 01/05/2017    Pneumococcal Conjugate - 20 Valent 10/14/2022, 09/01/2023    Pneumococcal Polysaccharide - 23 Valent 08/21/2018    RSVpreF (Arexvy) 08/31/2023    Tdap 05/28/2023    Tetanus 11/01/2011, 11/02/2011    Zoster 12/09/2015, 12/09/2015    Zoster Recombinant 02/15/2022, 02/15/2022, 05/05/2022       Review of patient's allergies indicates:  No Known Allergies    Current Outpatient Medications:     amLODIPine (NORVASC) 2.5 MG tablet, Take 1 tablet by mouth once daily for blood pressure, Disp: 90 tablet, Rfl: 2    artificial tears (ISOPTO TEARS) 0.5 % ophthalmic solution, 1 drop as needed., Disp: , Rfl:     blood sugar diagnostic (BLOOD GLUCOSE TEST) Strp, True Metrix glucose strips check once daily, Disp: 100 each, Rfl: 3    co-enzyme Q-10 30 mg capsule, Take 30 mg by mouth once daily., Disp: , Rfl:     donepeziL (ARICEPT) 10 MG tablet, TAKE 1 TABLET BY MOUTH ONCE DAILY IN THE EVENING, Disp: 30 tablet, Rfl: 5    doxepin (SINEQUAN) 25 MG capsule, Take 1 capsule (25 mg total) by mouth every evening., Disp: 90 capsule, Rfl: 1    ergocalciferol  (ERGOCALCIFEROL) 50,000 unit Cap, Take one capsule twice monthly., Disp: 6 capsule, Rfl: 3    fluticasone propionate (FLONASE) 50 mcg/actuation nasal spray, Use 1 spray(s) in each nostril once daily, Disp: 16 g, Rfl: 10    ibuprofen (ADVIL,MOTRIN) 600 MG tablet, Take 1 tablet (600 mg total) by mouth every 6 (six) hours as needed for Pain., Disp: 30 tablet, Rfl: 0    levothyroxine (SYNTHROID) 88 MCG tablet, Take 1 tablet (88 mcg total) by mouth before breakfast., Disp: 30 tablet, Rfl: 11    loratadine (CLARITIN) 10 mg tablet, Take 1 tablet (10 mg total) by mouth once daily., Disp: 30 tablet, Rfl: 5    losartan (COZAAR) 100 MG tablet, Take 1 tablet by mouth once daily, Disp: 90 tablet, Rfl: 0    rosuvastatin (CRESTOR) 10 MG tablet, Take 1 tablet by mouth once daily, Disp: 90 tablet, Rfl: 1    thiamine 100 MG tablet, Take 1 tablet (100 mg total) by mouth once daily., Disp: 30 tablet, Rfl: 5    tiZANidine (ZANAFLEX) 2 MG tablet, TAKE 2 TABLETS BY MOUTH EVERY 8 HOURS AS NEEDED FOR PAIN /SPASM, Disp: 30 tablet, Rfl: 2    blood-glucose meter kit, True Metrix glucometer check glucose once daily, Disp: 1 each, Rfl: 0    diazePAM (VALIUM) 2 MG tablet, Take 1 tablet (2 mg total) by mouth nightly as needed (muscle spasm). (Patient not taking: Reported on 1/24/2025), Disp: 7 tablet, Rfl: 0  No current facility-administered medications for this visit.    Facility-Administered Medications Ordered in Other Visits:     lactated ringers infusion, , Intravenous, Continuous, Julián Chiang MD, Last Rate: 25 mL/hr at 03/27/24 1200, New Bag at 03/27/24 1200    LIDOcaine (PF) 10 mg/ml (1%) injection 10 mg, 1 mL, Intradermal, Once, Julián Chiang MD    Review of Systems   Constitutional:  Negative for chills and fever.   Respiratory:  Negative for shortness of breath.    Cardiovascular:  Negative for chest pain.   Gastrointestinal:  Negative for abdominal pain, blood in stool, nausea and vomiting.   Genitourinary:  Negative for hematuria.  "  Integumentary:  Negative for rash.   Neurological:         Memory difficulty        OBJECTIVE:      Vitals:    01/24/25 0902   BP: 134/60   BP Location: Right arm   Patient Position: Sitting   Pulse: 68   SpO2: 98%   Weight: 77.1 kg (169 lb 15.6 oz)   Height: 5' 6" (1.676 m)     Physical Exam  Constitutional:       General: She is not in acute distress.     Appearance: She is not ill-appearing or toxic-appearing.   HENT:      Head: Normocephalic and atraumatic.      Mouth/Throat:      Mouth: Mucous membranes are moist.      Pharynx: Uvula midline. No pharyngeal swelling.   Cardiovascular:      Rate and Rhythm: Normal rate and regular rhythm.   Pulmonary:      Effort: Pulmonary effort is normal. No tachypnea, bradypnea, accessory muscle usage, prolonged expiration or respiratory distress.      Breath sounds: Normal breath sounds. No stridor. No wheezing, rhonchi or rales.   Abdominal:      General: Bowel sounds are normal. There is no distension.      Palpations: Abdomen is soft.      Tenderness: There is no abdominal tenderness. There is no guarding or rebound.   Skin:     Comments: No rash or erythema underneath her bra    Neurological:      General: No focal deficit present.      Mental Status: She is alert.   Psychiatric:         Mood and Affect: Mood normal.         Behavior: Behavior normal.        Assessment:       1. Routine general medical examination at a health care facility    2. History of thyroid cancer    3. Postoperative hypothyroidism    4. Stage 3a chronic kidney disease    5. Controlled type 2 diabetes mellitus without complication, without long-term current use of insulin    6. Hypertension associated with diabetes    7. Polyneuropathy    8. RONNIE (generalized anxiety disorder), 2019    9. Memory impairment        Plan:       Routine general medical examination at a health care facility  -     Hemoglobin A1C; Future; Expected date: 01/24/2025  -     Lipid Panel; Future; Expected date: " 01/24/2025    History of thyroid cancer  - Reviewed patient's thyroid cancer history, including total thyroidectomy, lymph node positive surgery, and radiation treatment until December 2021.  - PET scan in summer 2023 was negative    Postoperative hypothyroidism  - Continue levothyroxine 88 mcg early in the morning on an empty stomach.  - Patient had thyroid checked with Dr. Daugherty two weeks ago  - Endocrinologist is monitoring levels and adjusting medication dosage.  - Next thyroid level check scheduled for July.    Stage 3a chronic kidney disease  -     CBC Auto Differential; Future; Expected date: 01/24/2025  -     Comprehensive Metabolic Panel; Future; Expected date: 01/24/2025  - Noted the patient's kidney function is slightly reduced.  - Ordered kidney function tests, liver function tests, and electrolyte panel.  - Will follow up with the patient about the results.    Controlled type 2 diabetes mellitus without complication, without long-term current use of insulin  - Will check A1c today   - Continue with statin     Hypertension associated with diabetes  -     CBC Auto Differential; Future; Expected date: 01/24/2025  - Continue amlodipine and losartan for blood pressure management.    Polyneuropathy  - Stable  - Continue to monitor     RONNIE (generalized anxiety disorder), 2019  - Anxiety currently well-controlled without medication.  - Discussed non-pharmacological management techniques, including deep breathing, relaxation, and taking walks.  - Instructed patient to practice these techniques.  - Advised to contact the office if anxiety worsens or additional support is needed.    Memory impairment  - Patient reports having difficulty with short term memories  - Denies getting lost while driving in a familiar setting  - Family history of dementia  - Continue with Donepezil for now   - Follow up for memory assessment with additional labs and/or referral to neurology. Patient would like to consider coming back for  a visit for memory assessment first before considering f/u with neurology     HYPERLIPIDEMIA:  - Continue rosuvastatin 10 mg for cholesterol management.  - Ordered cholesterol panel.    SKIN CARE:  - Educated on the importance of keeping areas prone to sweating dry, especially under bra, to prevent rash and itchiness.    FOLLOW UP:  - Follow up with physician assistant in 6 months and with provider in 1 year.  - Contact the office if itchiness on left rib cage worsens or redness/rash appears.  - Await lab results, which will be reviewed and communicated by Thursday.    Counseled on age and gender appropriate medical preventative services, including cancer screenings, immunizations, overall nutritional health, need for a consistent exercise regimen and an overall push towards maintaining a vigorous and active lifestyle.        Blanca Rivera M.D.  1/24/2025    This note was created using Snipi voice recognition software that occasionally misinterprets phrases or words

## 2025-01-24 ENCOUNTER — LAB VISIT (OUTPATIENT)
Dept: LAB | Facility: HOSPITAL | Age: 80
End: 2025-01-24
Attending: STUDENT IN AN ORGANIZED HEALTH CARE EDUCATION/TRAINING PROGRAM
Payer: MEDICARE

## 2025-01-24 ENCOUNTER — OFFICE VISIT (OUTPATIENT)
Dept: FAMILY MEDICINE | Facility: CLINIC | Age: 80
End: 2025-01-24
Payer: MEDICARE

## 2025-01-24 VITALS
HEART RATE: 68 BPM | BODY MASS INDEX: 27.32 KG/M2 | OXYGEN SATURATION: 98 % | HEIGHT: 66 IN | WEIGHT: 170 LBS | SYSTOLIC BLOOD PRESSURE: 134 MMHG | DIASTOLIC BLOOD PRESSURE: 60 MMHG

## 2025-01-24 DIAGNOSIS — Z85.850 HISTORY OF THYROID CANCER: ICD-10-CM

## 2025-01-24 DIAGNOSIS — Z00.00 ROUTINE GENERAL MEDICAL EXAMINATION AT A HEALTH CARE FACILITY: ICD-10-CM

## 2025-01-24 DIAGNOSIS — R41.3 MEMORY IMPAIRMENT: ICD-10-CM

## 2025-01-24 DIAGNOSIS — E11.9 CONTROLLED TYPE 2 DIABETES MELLITUS WITHOUT COMPLICATION, WITHOUT LONG-TERM CURRENT USE OF INSULIN: ICD-10-CM

## 2025-01-24 DIAGNOSIS — Z00.00 ROUTINE GENERAL MEDICAL EXAMINATION AT A HEALTH CARE FACILITY: Primary | ICD-10-CM

## 2025-01-24 DIAGNOSIS — I15.2 HYPERTENSION ASSOCIATED WITH DIABETES: ICD-10-CM

## 2025-01-24 DIAGNOSIS — N18.31 STAGE 3A CHRONIC KIDNEY DISEASE: ICD-10-CM

## 2025-01-24 DIAGNOSIS — G62.9 POLYNEUROPATHY: ICD-10-CM

## 2025-01-24 DIAGNOSIS — E11.59 HYPERTENSION ASSOCIATED WITH DIABETES: ICD-10-CM

## 2025-01-24 DIAGNOSIS — E89.0 POSTOPERATIVE HYPOTHYROIDISM: ICD-10-CM

## 2025-01-24 DIAGNOSIS — F41.1 GAD (GENERALIZED ANXIETY DISORDER): ICD-10-CM

## 2025-01-24 LAB
BASOPHILS # BLD AUTO: 0.06 K/UL (ref 0–0.2)
BASOPHILS NFR BLD: 1.2 % (ref 0–1.9)
DIFFERENTIAL METHOD BLD: ABNORMAL
EOSINOPHIL # BLD AUTO: 0.2 K/UL (ref 0–0.5)
EOSINOPHIL NFR BLD: 4.7 % (ref 0–8)
ERYTHROCYTE [DISTWIDTH] IN BLOOD BY AUTOMATED COUNT: 13.5 % (ref 11.5–14.5)
HCT VFR BLD AUTO: 37.8 % (ref 37–48.5)
HGB BLD-MCNC: 11.9 G/DL (ref 12–16)
IMM GRANULOCYTES # BLD AUTO: 0 K/UL (ref 0–0.04)
IMM GRANULOCYTES NFR BLD AUTO: 0 % (ref 0–0.5)
LYMPHOCYTES # BLD AUTO: 1.7 K/UL (ref 1–4.8)
LYMPHOCYTES NFR BLD: 32.7 % (ref 18–48)
MCH RBC QN AUTO: 28.1 PG (ref 27–31)
MCHC RBC AUTO-ENTMCNC: 31.5 G/DL (ref 32–36)
MCV RBC AUTO: 89 FL (ref 82–98)
MONOCYTES # BLD AUTO: 0.4 K/UL (ref 0.3–1)
MONOCYTES NFR BLD: 8.6 % (ref 4–15)
NEUTROPHILS # BLD AUTO: 2.7 K/UL (ref 1.8–7.7)
NEUTROPHILS NFR BLD: 52.8 % (ref 38–73)
NRBC BLD-RTO: 0 /100 WBC
PLATELET # BLD AUTO: 301 K/UL (ref 150–450)
PMV BLD AUTO: 9.9 FL (ref 9.2–12.9)
RBC # BLD AUTO: 4.23 M/UL (ref 4–5.4)
WBC # BLD AUTO: 5.1 K/UL (ref 3.9–12.7)

## 2025-01-24 PROCEDURE — 80053 COMPREHEN METABOLIC PANEL: CPT | Performed by: STUDENT IN AN ORGANIZED HEALTH CARE EDUCATION/TRAINING PROGRAM

## 2025-01-24 PROCEDURE — 83036 HEMOGLOBIN GLYCOSYLATED A1C: CPT | Performed by: STUDENT IN AN ORGANIZED HEALTH CARE EDUCATION/TRAINING PROGRAM

## 2025-01-24 PROCEDURE — G2211 COMPLEX E/M VISIT ADD ON: HCPCS | Mod: S$GLB,,, | Performed by: STUDENT IN AN ORGANIZED HEALTH CARE EDUCATION/TRAINING PROGRAM

## 2025-01-24 PROCEDURE — 1159F MED LIST DOCD IN RCRD: CPT | Mod: CPTII,S$GLB,, | Performed by: STUDENT IN AN ORGANIZED HEALTH CARE EDUCATION/TRAINING PROGRAM

## 2025-01-24 PROCEDURE — 99214 OFFICE O/P EST MOD 30 MIN: CPT | Mod: S$GLB,,, | Performed by: STUDENT IN AN ORGANIZED HEALTH CARE EDUCATION/TRAINING PROGRAM

## 2025-01-24 PROCEDURE — 80061 LIPID PANEL: CPT | Performed by: STUDENT IN AN ORGANIZED HEALTH CARE EDUCATION/TRAINING PROGRAM

## 2025-01-24 PROCEDURE — 1160F RVW MEDS BY RX/DR IN RCRD: CPT | Mod: CPTII,S$GLB,, | Performed by: STUDENT IN AN ORGANIZED HEALTH CARE EDUCATION/TRAINING PROGRAM

## 2025-01-24 PROCEDURE — 3288F FALL RISK ASSESSMENT DOCD: CPT | Mod: CPTII,S$GLB,, | Performed by: STUDENT IN AN ORGANIZED HEALTH CARE EDUCATION/TRAINING PROGRAM

## 2025-01-24 PROCEDURE — 36415 COLL VENOUS BLD VENIPUNCTURE: CPT | Mod: PO | Performed by: STUDENT IN AN ORGANIZED HEALTH CARE EDUCATION/TRAINING PROGRAM

## 2025-01-24 PROCEDURE — 3078F DIAST BP <80 MM HG: CPT | Mod: CPTII,S$GLB,, | Performed by: STUDENT IN AN ORGANIZED HEALTH CARE EDUCATION/TRAINING PROGRAM

## 2025-01-24 PROCEDURE — 85025 COMPLETE CBC W/AUTO DIFF WBC: CPT | Performed by: STUDENT IN AN ORGANIZED HEALTH CARE EDUCATION/TRAINING PROGRAM

## 2025-01-24 PROCEDURE — 1126F AMNT PAIN NOTED NONE PRSNT: CPT | Mod: CPTII,S$GLB,, | Performed by: STUDENT IN AN ORGANIZED HEALTH CARE EDUCATION/TRAINING PROGRAM

## 2025-01-24 PROCEDURE — 3075F SYST BP GE 130 - 139MM HG: CPT | Mod: CPTII,S$GLB,, | Performed by: STUDENT IN AN ORGANIZED HEALTH CARE EDUCATION/TRAINING PROGRAM

## 2025-01-24 PROCEDURE — 99999 PR PBB SHADOW E&M-EST. PATIENT-LVL IV: CPT | Mod: PBBFAC,,, | Performed by: STUDENT IN AN ORGANIZED HEALTH CARE EDUCATION/TRAINING PROGRAM

## 2025-01-24 PROCEDURE — 1101F PT FALLS ASSESS-DOCD LE1/YR: CPT | Mod: CPTII,S$GLB,, | Performed by: STUDENT IN AN ORGANIZED HEALTH CARE EDUCATION/TRAINING PROGRAM

## 2025-01-24 PROCEDURE — 3072F LOW RISK FOR RETINOPATHY: CPT | Mod: CPTII,S$GLB,, | Performed by: STUDENT IN AN ORGANIZED HEALTH CARE EDUCATION/TRAINING PROGRAM

## 2025-01-25 LAB
ALBUMIN SERPL BCP-MCNC: 3.8 G/DL (ref 3.5–5.2)
ALP SERPL-CCNC: 50 U/L (ref 40–150)
ALT SERPL W/O P-5'-P-CCNC: 13 U/L (ref 10–44)
ANION GAP SERPL CALC-SCNC: 13 MMOL/L (ref 8–16)
AST SERPL-CCNC: 19 U/L (ref 10–40)
BILIRUB SERPL-MCNC: 0.5 MG/DL (ref 0.1–1)
BUN SERPL-MCNC: 18 MG/DL (ref 8–23)
CALCIUM SERPL-MCNC: 8.6 MG/DL (ref 8.7–10.5)
CHLORIDE SERPL-SCNC: 103 MMOL/L (ref 95–110)
CHOLEST SERPL-MCNC: 176 MG/DL (ref 120–199)
CHOLEST/HDLC SERPL: 2.7 {RATIO} (ref 2–5)
CO2 SERPL-SCNC: 25 MMOL/L (ref 23–29)
CREAT SERPL-MCNC: 1.1 MG/DL (ref 0.5–1.4)
EST. GFR  (NO RACE VARIABLE): 50.5 ML/MIN/1.73 M^2
ESTIMATED AVG GLUCOSE: 126 MG/DL (ref 68–131)
GLUCOSE SERPL-MCNC: 87 MG/DL (ref 70–110)
HBA1C MFR BLD: 6 % (ref 4–5.6)
HDLC SERPL-MCNC: 65 MG/DL (ref 40–75)
HDLC SERPL: 36.9 % (ref 20–50)
LDLC SERPL CALC-MCNC: 99.2 MG/DL (ref 63–159)
NONHDLC SERPL-MCNC: 111 MG/DL
POTASSIUM SERPL-SCNC: 4.1 MMOL/L (ref 3.5–5.1)
PROT SERPL-MCNC: 7.6 G/DL (ref 6–8.4)
SODIUM SERPL-SCNC: 141 MMOL/L (ref 136–145)
TRIGL SERPL-MCNC: 59 MG/DL (ref 30–150)

## 2025-03-05 DIAGNOSIS — R41.3 MEMORY IMPAIRMENT: ICD-10-CM

## 2025-03-05 RX ORDER — DONEPEZIL HYDROCHLORIDE 10 MG/1
10 TABLET, FILM COATED ORAL NIGHTLY
Qty: 30 TABLET | Refills: 11 | Status: SHIPPED | OUTPATIENT
Start: 2025-03-05

## 2025-03-05 NOTE — TELEPHONE ENCOUNTER
On call refill request; Dr. Rivera is out of the office.  Will forward to PCP care team for further assistance in Dr. Rivera's absence.     LR--7/31/24       LOV--1/24/25       FOV--7/25/25

## 2025-03-05 NOTE — TELEPHONE ENCOUNTER
Jolanta Barkley Staff     ----- Message from Jolanta sent at 3/5/2025  7:53 AM CST -----  Regarding: Needs refills  Type:  RX Refill RequestWho Called:  PtRefill or New Rx:  refillRX Name and Strength:  donepeziL (ARICEPT) 10 MG tabletHow is the patient currently taking it? (ex. 1XDay):  see chartIs this a 30 day or 90 day RX:  see chartPreferred Pharmacy with phone number:  [facility] - [place], [place] - [address] [facility].[address] [place] [address]Nxppr: [phone] Fax: [phone] or Mail Order:  localOrdering Provider:  Dolores Call Back Number:  [phone] Information:  ----- Message -----  From: Jolanta Barkley  Sent: 3/5/2025   7:54 AM CST  To: Miguel Herrera  Subject: Needs refills                                    Type:  RX Refill RequestWho Called:  PtRefill or New Rx:  refillRX Name and Strength:  donepeziL (ARICEPT) 10 MG tabletHow is the patient currently taking it? (ex. 1XDay):  see chartIs this a 30 day or 90 day RX:  see chartPreferred Pharmacy with phone number:  [facility] [place]  [address] [place] [address].[address] [place] [phone]: [phone] Fax: [phone] or Mail Order:  localOrdering Provider:  Dolores Call Back Number:  [phone] Information:

## 2025-03-24 DIAGNOSIS — Z00.00 ENCOUNTER FOR MEDICARE ANNUAL WELLNESS EXAM: ICD-10-CM

## 2025-04-08 ENCOUNTER — TELEPHONE (OUTPATIENT)
Dept: FAMILY MEDICINE | Facility: CLINIC | Age: 80
End: 2025-04-08
Payer: MEDICARE

## 2025-04-08 NOTE — TELEPHONE ENCOUNTER
Patient requesting to schedule appointment for mammogram (due in July).  States she would like to schedule mammogram for the date of 7/25/25 (the same date of appointment with RUSLAN Orellana).  Will send message to Dr. Rivera to place order for mammogram as writer is unable to determine which type (screening or diagnostic) is needed.           Encounter Date: 9/19/2024       Spoke to patient and reminded her she had her mammogram 7/15/24 and is due next July.       Electronically signed by Rebeca Arteaga LPN at 9/19/2024  3:42 PM      Jun Valencia [facility] Staff     ----- Message from Jun sent at 4/8/2025  4:34 PM CDT -----  Contact: self  Type: Needs Medical Advice  Who Called:  PT  Best Call Back Number: [phone]   Additional Information: Need updated referral for mammogram.  Please call when entered.

## 2025-04-09 DIAGNOSIS — R92.8 ABNORMAL MAMMOGRAM: Primary | ICD-10-CM

## 2025-04-10 NOTE — TELEPHONE ENCOUNTER
Mammogram appointment scheduled for the date of 7/25/25; patient agreed to appointment date, time, and location (location confirmed at the time of call).

## 2025-04-21 ENCOUNTER — OFFICE VISIT (OUTPATIENT)
Dept: OTOLARYNGOLOGY | Facility: CLINIC | Age: 80
End: 2025-04-21
Payer: MEDICARE

## 2025-04-21 VITALS
SYSTOLIC BLOOD PRESSURE: 152 MMHG | WEIGHT: 167.75 LBS | HEART RATE: 64 BPM | HEIGHT: 66 IN | DIASTOLIC BLOOD PRESSURE: 68 MMHG | BODY MASS INDEX: 26.96 KG/M2

## 2025-04-21 DIAGNOSIS — E89.0 POST-SURGICAL HYPOTHYROIDISM: Primary | ICD-10-CM

## 2025-04-21 DIAGNOSIS — Z85.850 HISTORY OF THYROID CANCER: ICD-10-CM

## 2025-04-21 PROCEDURE — 3288F FALL RISK ASSESSMENT DOCD: CPT | Mod: CPTII,S$GLB,, | Performed by: OTOLARYNGOLOGY

## 2025-04-21 PROCEDURE — 1126F AMNT PAIN NOTED NONE PRSNT: CPT | Mod: CPTII,S$GLB,, | Performed by: OTOLARYNGOLOGY

## 2025-04-21 PROCEDURE — 99999 PR PBB SHADOW E&M-EST. PATIENT-LVL IV: CPT | Mod: PBBFAC,,, | Performed by: OTOLARYNGOLOGY

## 2025-04-21 PROCEDURE — 3077F SYST BP >= 140 MM HG: CPT | Mod: CPTII,S$GLB,, | Performed by: OTOLARYNGOLOGY

## 2025-04-21 PROCEDURE — 1160F RVW MEDS BY RX/DR IN RCRD: CPT | Mod: CPTII,S$GLB,, | Performed by: OTOLARYNGOLOGY

## 2025-04-21 PROCEDURE — 99214 OFFICE O/P EST MOD 30 MIN: CPT | Mod: S$GLB,,, | Performed by: OTOLARYNGOLOGY

## 2025-04-21 PROCEDURE — 3072F LOW RISK FOR RETINOPATHY: CPT | Mod: CPTII,S$GLB,, | Performed by: OTOLARYNGOLOGY

## 2025-04-21 PROCEDURE — 3078F DIAST BP <80 MM HG: CPT | Mod: CPTII,S$GLB,, | Performed by: OTOLARYNGOLOGY

## 2025-04-21 PROCEDURE — 1101F PT FALLS ASSESS-DOCD LE1/YR: CPT | Mod: CPTII,S$GLB,, | Performed by: OTOLARYNGOLOGY

## 2025-04-21 PROCEDURE — 1159F MED LIST DOCD IN RCRD: CPT | Mod: CPTII,S$GLB,, | Performed by: OTOLARYNGOLOGY

## 2025-04-21 NOTE — PROGRESS NOTES
[facility]    Subjective:      Patient: Erica Masterson Patient PCP: Blanca Rivera MD         :  1945     Sex:  female      MRN:  [id number]          Date of Visit: 2025      Chief Complaint: Thyroid Problem (Annual check up/surveillance hx. Thyroid Cancer. CT head/neck done 1/3/2025. States no issues or concerns today. )      Patient ID: Erica Masterson is a 79 y.o. female     2025 of established patient visit of the group 1st visit in [place] Patient is a  lifelong NON-smoker with a past medical history of well-differentiated thyroid carcinoma treated with PRIMARY SURGERY with general surgery followed by completion thyroidectomy central and left lateral neck dissection with Dr. Torres all in  post surgical treatment with I131 100 mCi seen today for annual surveillance.  Seen by head and neck TOMMY and Dr. Torres a year ago with decreasing thyroglobulin levels and a post surgical ultrasound showing only calcification in the thyroid bed with no suspicious adenopathy reported.  Thyroglobulin levels of never decreased to athyrotic levels of <0.1.  Currently 7.9 but they have range from post surgical 12 to a low of 7.4.    No new symptoms of voice change, trouble swallowing, neck mass.    Repeat thyroid ultrasound of the neck ordered by Dr. Abebe and completed 2025 shows postsurgical thyroid bed with no suspicious lymph nodes or detrimental change.    TSH levels in the last year of ranged from a high of 1.964 to her current level which is her mehdi of 0.040.  No symptoms of hypothyroidism with no fatigue, brain fog, skin or hair changes or constipation.    Oncology History   Thyroid cancer   2021 Initial Diagnosis    Thyroid cancer     2021 Surgery    Total thyroidectomy (Dr. Amezquita)     8/10/2021 Cancer Staged    Staging form: Thyroid - Differentiated, AJCC 8th Edition  - Clinical stage from 8/10/2021: Stage III (cT4a, cNX, cM0, Age at diagnosis: >= 55 years)     2021  Surgery    1) Completion total thyroidectomy with bilateral paratracheal and superior mediastinal dissection  2) Left modified neck dissection of levels II-Vb       9/27/2021 Cancer Staged    Staging form: Thyroid - Differentiated, AJCC 8th Edition  - Pathologic stage from 9/27/2021: Stage III (pT4a, pN1b, cM0, Age at diagnosis: >= 55 years)     12/15/2021 -  Radiation Therapy    Treating physician: Dr. Landrum, Dr. Yepez  Total Dose: 100 mCi COOPER             01/03/2025 US SOFT TISSUE HEAD NECK    CLINICAL HISTORY:  Malignant neoplasm of thyroid gland     TECHNIQUE:  Ultrasound of the thyroid and cervical lymph nodes was performed.     COMPARISON:  06/12/2024     FINDINGS:  There is history of thyroidectomy.  There is no tissue along the operative bed.  There is a normal sized right R3 lymph node which is 0.3 cm short axis diameter.  There is a normal size left L2 lymph node which is 0.5 cm short axis diameter.     Impression:     Postsurgical change of the thyroid.  No abnormal enlarged cervical lymph nodes.  No detrimental change.        Electronically signed by:Antony Girard  Date:                                            01/03/2025    TSH   Date Value Ref Range Status   01/03/2025 0.040 (L) 0.400 - 4.000 uIU/mL Final   09/03/2024 0.183 (L) 0.400 - 4.000 uIU/mL Final   07/15/2024 1.964 0.400 - 4.000 uIU/mL Final   06/12/2024 0.304 (L) 0.400 - 4.000 uIU/mL Final   04/19/2024 1.206 0.400 - 4.000 uIU/mL Final         Thyroglobulin, Tumor Marker   Date Value Ref Range Status   01/03/2025 7.9 (H) ng/mL Final     Comment:     -------------------REFERENCE VALUE--------------------------  Athyrotic <0.1   Intact Thyroid <=33     07/15/2024 11 (H) ng/mL Final     Comment:     -------------------REFERENCE VALUE--------------------------  Athyrotic <0.1   Intact Thyroid <=33     06/12/2024 7.4 (H) ng/mL Final     Comment:     -------------------REFERENCE VALUE--------------------------  Athyrotic <0.1   Intact Thyroid  "<=33     04/19/2024 9.3 (H) ng/mL Final     Comment:     -------------------REFERENCE VALUE--------------------------  Athyrotic <0.1   Intact Thyroid <=33     10/25/2023 7.2 (H) ng/mL Final     Comment:     -------------------REFERENCE VALUE--------------------------  Athyrotic <0.1   Intact Thyroid <=33     05/22/2023 16 (H) ng/mL Final     Comment:     -------------------REFERENCE VALUE--------------------------  Athyrotic <0.1   Intact Thyroid <=33     09/30/2022 7.6 (H) ng/mL Final     Comment:     -------------------REFERENCE VALUE--------------------------  Athyrotic <0.1   Intact Thyroid <=33     09/01/2022 9.0 (H) ng/mL Final     Comment:     -------------------REFERENCE VALUE--------------------------  Athyrotic <0.1   Intact Thyroid <=33     05/05/2022 8.0 (H) ng/mL Final     Comment:     -------------------REFERENCE VALUE--------------------------  Athyrotic <0.1   Intact Thyroid <=33     12/14/2021 12 (H) ng/mL Final     Comment:     -------------------REFERENCE VALUE--------------------------  Athyrotic <0.1   Intact Thyroid <=33     10/20/2021 9.0 (H) ng/mL Final     Comment:     -------------------REFERENCE VALUE--------------------------  Athyrotic <0.1   Intact Thyroid <=33     10/04/2021 12 (H) ng/mL Final     Comment:     -------------------REFERENCE VALUE--------------------------  Athyrotic <0.1   Intact Thyroid <=33     07/29/2021 12 (H) ng/mL Final     Comment:     -------------------REFERENCE VALUE--------------------------  Athyrotic <0.1   Intact Thyroid <=33     06/30/2021 91 (H) ng/mL Final     Comment:     -------------------REFERENCE VALUE--------------------------  Athyrotic <0.1   Intact Thyroid <=33           Component  Ref Range & Units ([place]) 3 yr ago   Final Pathologic Diagnosis 1. Skin and soft tissue, "scar tissue neck", excision:  - Benign skin and soft tissue with surgical site changes  2. Lymph node, left neck, level 5, excision:  - Two lymph nodes negative for malignancy " "(0/2)  3. 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 Lymph node, left neck, level 4, excision:  - Two of fourteen lymph nodes positive for metastatic carcinoma (2/14)  6. Soft tissue, "tissue by straps", excision:  - Benign fibrovascular adipose tissue and skeletal muscle with surgical site  changes  - Negative for malignancy  7. "Completion thyroidectomy and paratracheal lymph node dissection",  excision:  - One of two lymph nodes positive for metastatic papillary thyroid carcinoma  (1/2)  - Fragments of skeletal muscle with surgical site changes  - Fragments of unremarkable thymic tissue  - No definitive thyroid parenchyma identified  - See comment  COMMENT:  Sections of the completion thyroidectomy show skeletal muscle with surgical  site changes and thymic tissue.  There are two lymph nodes identified, one  with a metastatic deposit of papillary thyroid carcinoma (1/2). The specimen  has been entirely submitted and there is no thyroid parenchyma identified.  Please see the patients previous thyroidectomy case ([id number]).   Comment: Interp By FLAVIA London MD, Signed on 09/24/2021 at 16:24   Gross Patient ID/Pathology ID [id number]  Received in 7 parts  Part 1  Pathology ID:  [id number]  In formalin, labeled " Scar tissue neck-permanent", is a 6.3 x 0.4 x 0.4 cm  fragment of pigmented skin consistent with a scar removal.  The specimen is  serially sectioned and representative sections are submitted in cassette  [id number]  Part 2  Pathology ID:  [id number]  In formalin, labeled "left neck dissection level 5-perm", is a 4.5 x 3.0 x  1.8 cm piece of unoriented fibroadipose tissue.  The specimen is carefully  searched for lymph nodes.  Is single lymph node is identified.  Representative " "sections including entire lymph node are submitted in  cassettes [id number]:  A:  Lymph node  B:  Random sections of fibroadipose tissue.  Part 3  Pathology ID:  [id number]  Received fresh and subsequently fixed in formalin, labeled "left neck  dissection level 2 A", is a 5.0 x 4.5 cm piece of unoriented fibroadipose  tissue.  The specimen is searched for lymph nodes.  Six lymph nodes  identified and entirely submitted in cassettes [id number], 3 lymph  nodes in each cassette.  Part 4  Pathology ID:  [id number]  Received fresh and subsequently fixed in formalin, labeled "left neck  dissection level 3", is a 4.0 x 3.8 cm piece of fibroadipose tissue.  The  specimen is carefully searched for lymph nodes.  The specimen has a 1.3 x 1.2  cm calcified nodule and 6 lymph nodes.  Sections are submitted in cassettes  [id number]:  A-B:  6 lymph nodes in each cassette  C:  Calcified nodule following decalcification.  Part 5  Pathology ID:  [id number]  Received fresh and subsequently fixed in formalin, labeled "left neck  dissection level 4-permanent", is a 5.7 x 3.2 cm unoriented piece of  fibroadipose tissue.  The specimen is carefully searched for lymph nodes.  Seventeen lymph nodes identified.  The lymph nodes range from -1.6 cm.  The  largest lymph node is partially calcified.  Lymph nodes are submitted in  cassettes [id number]:  A-B:  7 lymph nodes in each cassette  C:  3 lymph nodes  Part 6  Pathology ID:  [id number]  In formalin, labeled "tissue by straps", is a 4.0 x 2.8 cm piece of rubbery  soft, unoriented fibroadipose tissue.  The specimen is searched for lymph  nodes.  Grossly no definite lymph node is identified.  The specimen is  sectioned and entirely submitted in cassettes  Part 7  Pathology ID:  [id number]  Received fresh and subsequently fixed in formalin, labeled "completion  thyroidectomy and paratracheal lymph node dissection-permanent", are 2  fragments of unoriented, rubbery soft, tan pink-yellow and " hemorrhagic  tissue.  In aggregate the specimen measures 4.5 x 2.5 x 1.3 cm.  The margin  is inked green.  On cut sections part of the specimen resembles thyroid  parenchyma.  The specimen is serially sectioned and entirely submitted in  cassettes [id number]:  A-B:  Possible thyroid parenchyma  C-F:  Remainder of specimen  Robert MERCER ([facility]) cm   Disclaimer Unless the case is a 'gross only' or additional testing only, the final  diagnosis for each specimen is based on a microscopic examination of  appropriate tissue sections.   [facility]              Labs:  WBC   Date Value Ref Range Status   01/24/2025 5.10 3.90 - 12.70 K/uL Final     Hemoglobin   Date Value Ref Range Status   01/24/2025 11.9 (L) 12.0 - 16.0 g/dL Final     Platelets   Date Value Ref Range Status   01/24/2025 301 150 - 450 K/uL Final     Creatinine   Date Value Ref Range Status   01/24/2025 1.1 0.5 - 1.4 mg/dL Final     TSH   Date Value Ref Range Status   01/03/2025 0.040 (L) 0.400 - 4.000 uIU/mL Final     Hemoglobin A1C   Date Value Ref Range Status   01/24/2025 6.0 (H) 4.0 - 5.6 % Final     Comment:     ADA Screening Guidelines:  5.7-6.4%  Consistent with prediabetes  >or=6.5%  Consistent with diabetes    High levels of fetal hemoglobin interfere with the HbA1C  assay. Heterozygous hemoglobin variants (HbS, HgC, etc)do  not significantly interfere with this assay.   However, presence of multiple variants may affect accuracy.         Past Medical History  She has a past medical history of Adenomatous polyp of colon, Allergy, Anxiety, Cancer, Cataract, Colon polyp, Degenerative arthritis of knee, Diverticulosis, Encounter for blood transfusion, GERD (gastroesophageal reflux disease), History of thyroid cancer, Hyperlipidemia, Hypertension, Lateral meniscal tear, Multinodular goiter, Myopathy, unspecified, and Tuberculosis.    Family / Surgical / Social History  Her family history includes Alcohol abuse in her son; Alzheimer's  disease in her maternal uncle and sister; Breast cancer (age of onset: 60) in her sister; Cancer in her brother, brother, maternal grandmother, and mother; Colon cancer in an other family member; Dementia in her sister; Diabetes in her maternal aunt and son; Emphysema in her father; Hyperlipidemia in her mother; Hypertension in her father, mother, and son; No Known Problems in her daughter; Obesity in her paternal uncle; Prostate cancer in an other family member.    Past Surgical History:   Procedure Laterality Date    BREAST BIOPSY Left 2015    benign    CHOLECYSTECTOMY      COLONOSCOPY  12/02/2015    Dr. Britton, multiple polyps, recheck five years-three years if more than 2 polyps are adenomatous    COLONOSCOPY N/A 12/02/2015    COLONOSCOPY N/A 03/20/2019    Procedure: COLONOSCOPY;  Surgeon: Jose Britton MD;  Location[facility];  Service: Endoscopy;  Laterality: N/A;    COLONOSCOPY N/A 05/20/2020    Dr. Britton; internal hemorrhoids; diverticulosis; polyps removed; repeat in 3 years    COLONOSCOPY N/A 11/16/2023    Procedure: COLONOSCOPY(instructions mailed 11/9);  Surgeon: Jose Britton MD;  Location: [facility];  Service: Endoscopy;  Laterality: N/A;    CYSTOSCOPY N/A 08/26/2020    Procedure: CYSTOSCOPY;  Surgeon: Charlotte Walters Jr., MD;  [facility] OR;  Service: Urology;  Laterality: N/A;    DILATION AND CURETTAGE OF UTERUS      DISSECTION OF NECK Left 09/13/2021    Procedure: DISSECTION, NECK;  Surgeon: Ryan Torres MD;  Location: [address];  Service: ENT;  Laterality: Left;    EPIDURAL STEROID INJECTION INTO CERVICAL SPINE N/A 3/27/2024    Procedure: Injection-steroid-epidural-cervical;  Surgeon: Julián Chiang MD;  Location: [facility] OR;  Service: Anesthesiology;  Laterality: N/A;  c7-T1    EPIDURAL STEROID INJECTION INTO LUMBAR SPINE N/A 7/20/2023    Procedure: Injection-steroid-epidural-lumbar;  Surgeon: Julián Chiang MD;  Location: [facility] OR;  Service: Pain Management;  Laterality:  N/A;  L5-s1    ESOPHAGEAL MANOMETRY WITH MEASUREMENT OF IMPEDANCE N/A 08/22/2022    Procedure: MANOMETRY, ESOPHAGUS, WITH IMPEDANCE MEASUREMENT;  Surgeon: Pantera Mcguire MD;  Location: [facility] (4TH FLR);  Service: Endoscopy;  Laterality: N/A;  8/4 fully vaccinated; instructions mailed-st    ESOPHAGOGASTRODUODENOSCOPY N/A 05/20/2020    Dr. Britton; small hiatal hernia; gastritis; 8 gastric polyps removed    ESOPHAGOGASTRODUODENOSCOPY N/A 04/01/2022    Procedure: EGD (ESOPHAGOGASTRODUODENOSCOPY);  Surgeon: Jose Britton MD;  Location: [facility];  Service: Endoscopy;  Laterality: N/A;    FOOT SURGERY      HYSTERECTOMY      TVH/BSO > 20 years    INCISION OF VULVA Bilateral 8/4/2023    Procedure: EXCISION, CYST, LABIA AND OTHER INDICATED PROCEDURES;  Surgeon: Mat Beach MD;  Location: [facility] OR;  Service: OB/GYN;  Laterality: Bilateral;  EXCYSION OF VULVAR CYST    INJECTION, SACROILIAC JOINT Right 12/1/2023    Procedure: INJECTION,SACROILIAC JOINT;  Surgeon: Julián Chiang MD;  Location: [facility] OR;  Service: Anesthesiology;  Laterality: Right;  sacroiliac injection    INTRALUMINAL GASTROINTESTINAL TRACT IMAGING VIA CAPSULE N/A 06/04/2020    Procedure: IMAGING PROCEDURE, GI TRACT, INTRALUMINAL, VIA CAPSULE;  Surgeon: Jose Britton MD;  Location: [facility];  Service: Endoscopy;  Laterality: N/A;    KNEE SURGERY  06/06/2013    right knee tear Dr Iverson     LAPAROSCOPIC CHOLECYSTECTOMY N/A 09/17/2020    Procedure: CHOLECYSTECTOMY, LAPAROSCOPIC;  Surgeon: Forrest Veronica MD;  Location: [facility] OR;  Service: General;  Laterality: N/A;    LUNG LOBECTOMY  1966    right middle lobectomy, due to Tb    OOPHORECTOMY      SHOULDER SURGERY      francis     THYROIDECTOMY Bilateral 05/19/2021    Procedure: THYROIDECTOMY;  Surgeon: Jamia Amezquita MD;  Location: [facility] 2ND FLR;  Service: General;  Laterality: Bilateral;    THYROIDECTOMY Bilateral 09/13/2021    Procedure: THYROIDECTOMY WITH INTRA-OP PTH;  Surgeon: Ryan CHAVEZ  "MD Brian;  Location: [facility] OR [address] [place] [address];  Service: ENT;  Laterality: Bilateral;  NIM tube  Intraop PTH       Social History     Tobacco Use    Smoking status: Never    Smokeless tobacco: Never   Substance and Sexual Activity    Alcohol use: Not Currently     Comment: stopped 2019    Drug use: No    Sexual activity: Not Currently       Medications  She has a current medication list which includes the following prescription(s): amlodipine, isopto tears, blood glucose test, blood-glucose meter, co-enzyme q-10, donepezil, doxepin, ergocalciferol, fluticasone propionate, ibuprofen, levothyroxine, loratadine, losartan, rosuvastatin, thiamine, tizanidine, and diazepam, and the following Facility-Administered Medications: lactated ringers and lidocaine (pf) 10 mg/ml (1%).      Allergies  Review of patient's allergies indicates:  No Known Allergies    All medications, allergies, and past history have been reviewed.    Objective:      Vitals:      1/10/2025     2:52 PM 1/24/2025     9:02 AM 4/21/2025     1:56 PM   Vitals - 1 value per visit   SYSTOLIC 120 134 152   DIASTOLIC 72 60 68   Pulse 61 68 64   SPO2 97 % 98 %    Weight (lb) 170.86 169.97 167.77   Weight (kg) 77.5 77.1 76.1   Height 5' 6" (1.676 m) 5' 6" (1.676 m) 5' 6" (1.676 m)   BMI (Calculated) 27.6 27.4 27.1   Pain Score Zero Zero Zero       Body surface area is 1.88 meters squared.    Physical Exam:    GENERAL  APPEARANCE -  alert, appears stated age, and cooperative  BARRIER(S) TO COMMUNICATION -  none VOICE - appropriate for age and gender    INTEGUMENTARY  no suspicious head and neck lesions    HEENT  HEAD: Normocephalic, without obvious abnormality, atraumatic  FACE: INSPECTION - Symmetric, no signs of trauma, no suspicious lesion(s)      STRENGTH - facial symmetry intact     PALPATION -  No masses     SALIVARY GLANDS - non-tender with no appreciable mass    NECK/THYROID: normal atraumatic, no neck masses, normal thyroid, no jvd    EYES  Normal occular " alignment and mobility with no visible nystagmus at rest    EARS/NOSE/MOUTH/THROAT  EARS  PINNAE AND EXTERNAL EARS - no suspicious lesion OTOSCOPIC EXAM (surgical microscopy was not used for visualization/instrumentation): EAR EXAM - Normal ear canals, tympanic membranes and mobility, and middle ear spaces bilaterally.  HEARING - grossly intact to voice/finger rub    NOSE AND SINUSES  EXTERNAL NOSE - Grossly normal for age/sex  SEPTUM - normal/no obstruction on anterior exam without decongestion TURBINATES - within normal limits MUCOSA - within normal limits     MOUTH AND THROAT   ORAL CAVITY, LIPS, TEETH, GUMS & TONGUE - moist, no suspicious lesions  OROPHARYNX /TONSILS/PHARYNGEAL WALLS/HYPOPHARYNX - no erythema or exudates  NASOPHARYNX - limited mirror exam - unable to visualize due to anatomy/gag  LARYNX -  - limited mirror exam - unable to visualize due to anatomy/gag      CHEST AND LUNG   INSPECTION & AUSCULTATION - normal effort, no stridor    CARDIOVASCULAR  AUSCULTATION & PERIPHERAL VASCULAR - regular rate and rhythm.    NEUROLOGIC  MENTAL STATUS - alert, interactive CRANIAL NERVES - normal    LYMPHATIC  HEAD AND NECK - non-palpable; SUPRACLAVICULAR - non-palpable      Procedure(s):  None          Assessment:      Problem List Items Addressed This Visit    None  Visit Diagnoses         Post-surgical hypothyroidism    -  Primary      History of thyroid cancer                     Plan:      Stable but not undetectable thyroglobulin level.  Repeat ultrasound without suspicious findings.  No evidence of any persistent, recurrent, or metachronous malignancy on examination today.    No symptoms of medically induced hyperthyroidism.  Being followed by Dr. Daugherty.    Follow up in one year.          Voice recognition software was used in the creation of this note/communication and any sound-alike errors which may have occurred from its use should be taken in context when interpreting.  If such errors prevent a clear  understanding of the note/communication, please contact the office for clarification.

## 2025-05-14 ENCOUNTER — PATIENT OUTREACH (OUTPATIENT)
Dept: ADMINISTRATIVE | Facility: HOSPITAL | Age: 80
End: 2025-05-14
Payer: MEDICARE

## 2025-05-14 DIAGNOSIS — E11.9 DIABETES MELLITUS WITHOUT COMPLICATION: Primary | ICD-10-CM

## 2025-05-15 ENCOUNTER — OFFICE VISIT (OUTPATIENT)
Dept: FAMILY MEDICINE | Facility: CLINIC | Age: 80
End: 2025-05-15
Payer: MEDICARE

## 2025-05-15 VITALS
TEMPERATURE: 98 F | WEIGHT: 168.44 LBS | OXYGEN SATURATION: 100 % | DIASTOLIC BLOOD PRESSURE: 74 MMHG | SYSTOLIC BLOOD PRESSURE: 128 MMHG | HEIGHT: 66 IN | BODY MASS INDEX: 27.07 KG/M2 | HEART RATE: 75 BPM

## 2025-05-15 DIAGNOSIS — N18.31 STAGE 3A CHRONIC KIDNEY DISEASE: ICD-10-CM

## 2025-05-15 DIAGNOSIS — I70.0 ATHEROSCLEROSIS OF AORTA: ICD-10-CM

## 2025-05-15 DIAGNOSIS — I73.9 PAD (PERIPHERAL ARTERY DISEASE): Chronic | ICD-10-CM

## 2025-05-15 DIAGNOSIS — Z00.00 ENCOUNTER FOR MEDICARE ANNUAL WELLNESS EXAM: Primary | ICD-10-CM

## 2025-05-15 PROBLEM — C79.9 METASTASIS FROM THYROID CANCER: Status: RESOLVED | Noted: 2022-04-04 | Resolved: 2025-05-15

## 2025-05-15 PROBLEM — C73 METASTASIS FROM THYROID CANCER: Status: RESOLVED | Noted: 2022-04-04 | Resolved: 2025-05-15

## 2025-05-15 PROCEDURE — 99999 PR PBB SHADOW E&M-EST. PATIENT-LVL IV: CPT | Mod: PBBFAC,,, | Performed by: NURSE PRACTITIONER

## 2025-05-15 NOTE — PATIENT INSTRUCTIONS
Counseling and Referral of Other Preventative  (Italic type indicates deductible and co-insurance are waived)    Patient Name: Erica Masterson  Today's Date: 5/15/2025    Health Maintenance       Date Due Completion Date    Diabetes Urine Screening 04/19/2025 4/19/2024    Diabetic Eye Exam 08/13/2025 8/13/2024    Override on 6/15/2020: Done    Hemoglobin A1c 07/24/2025 1/24/2025    Influenza Vaccine (Season Ended) 09/01/2025 8/31/2023    Lipid Panel 01/24/2026 1/24/2025    Override on 9/26/2016: Done ([place] [facility] record sent to scanning)    DEXA Scan 11/01/2026 11/1/2023    Colonoscopy 11/16/2026 11/16/2023    Override on 10/12/2010: Done (Dr. Solares, 5 year recheck)    TETANUS VACCINE 05/28/2033 5/28/2023        No orders of the defined types were placed in this encounter.    The following information is provided to all patients.  This information is to help you find resources for any of the problems found today that may be affecting your health:                  Living healthy guide: www.[facility].[place].gov      Understanding Diabetes: www.diabetes.org      Eating healthy: www.cdc.gov/healthyweight      CDC home safety checklist: www.cdc.gov/steadi/patient.html      Agency on Aging: www.goea.[place].[facility]      Alcoholics anonymous (AA): www.aa.org      Physical Activity: www.silas.nih.gov/qc2lnho      Tobacco use: www.quitwithusla.org

## 2025-05-15 NOTE — PROGRESS NOTES
"  Erica Leee presented for a  Medicare AWV and comprehensive Health Risk Assessment today. The following components were reviewed and updated:    Medical history  Family History  Social history  Allergies and Current Medications  Health Risk Assessment  Health Maintenance  Care Team         ** See Completed Assessments for Annual Wellness Visit within the encounter summary.**         The following assessments were completed:  Living Situation  CAGE  Depression Screening  Timed Get Up and Go  Whisper Test  Cognitive Function Screening  Nutrition Screening  ADL Screening  PAQ Screening    Clock in media   Opioid documentation:      Patient does not have a current opioid prescription.        Vitals:    05/15/25 1507   BP: 128/74   Pulse: 75   Temp: 98.1 °F (36.7 °C)   TempSrc: Oral   SpO2: 100%   Weight: 76.4 kg (168 lb 6.9 oz)   Height: 5' 6" (1.676 m)     Body mass index is 27.19 kg/m².  Physical Exam  Constitutional:       Appearance: She is well-developed.   HENT:      Head: Normocephalic and atraumatic.      Nose: Nose normal.   Eyes:      General: Lids are normal.      Conjunctiva/sclera: Conjunctivae normal.   Cardiovascular:      Rate and Rhythm: Normal rate.   Pulmonary:      Effort: Pulmonary effort is normal.   Neurological:      Mental Status: She is alert and oriented to person, place, and time.   Psychiatric:         Speech: Speech normal.         Behavior: Behavior normal.               Diagnoses and health risks identified today and associated recommendations/orders:    1. Encounter for Medicare annual wellness exam  Discussed health maintenance guidelines appropriate for age.      - Referral to Enhanced Annual Wellness Visit (eAWV) M+1    2. PAD (peripheral artery disease)  Stable, continue to monitor     3. Stage 3a chronic kidney disease  Stable, continue to monitor  Followed by pcp     4. Atherosclerosis of aorta  Stable, continue to monitor  Bp controlled  Followed by pcp       Provided Erica with " a 5-10 year written screening schedule and personal prevention plan. Recommendations were developed using the USPSTF age appropriate recommendations. Education, counseling, and referrals were provided as needed. After Visit Summary printed and given to patient which includes a list of additional screenings\tests needed.    Follow up for One year for Annual Wellness Visit.    Maci Fonseca, NP      I offered to discuss advanced care planning, including how to pick a person who would make decisions for you if you were unable to make them for yourself, called a health care power of , and what kind of decisions you might make such as use of life sustaining treatments such as ventilators and tube feeding when faced with a life limiting illness recorded on a living will that they will need to know. (How you want to be cared for as you near the end of your natural life)     X Patient is interested in learning more about how to make advanced directives.  I provided them paperwork and offered to discuss this with them.

## 2025-05-27 ENCOUNTER — TELEPHONE (OUTPATIENT)
Dept: FAMILY MEDICINE | Facility: CLINIC | Age: 80
End: 2025-05-27
Payer: MEDICARE

## 2025-05-27 ENCOUNTER — RESULTS FOLLOW-UP (OUTPATIENT)
Dept: FAMILY MEDICINE | Facility: CLINIC | Age: 80
End: 2025-05-27

## 2025-05-27 ENCOUNTER — OFFICE VISIT (OUTPATIENT)
Dept: FAMILY MEDICINE | Facility: CLINIC | Age: 80
End: 2025-05-27
Payer: MEDICARE

## 2025-05-27 ENCOUNTER — HOSPITAL ENCOUNTER (OUTPATIENT)
Dept: RADIOLOGY | Facility: HOSPITAL | Age: 80
Discharge: HOME | End: 2025-05-27
Attending: NURSE PRACTITIONER
Payer: MEDICARE

## 2025-05-27 VITALS
HEART RATE: 71 BPM | DIASTOLIC BLOOD PRESSURE: 70 MMHG | BODY MASS INDEX: 27.07 KG/M2 | WEIGHT: 168.44 LBS | SYSTOLIC BLOOD PRESSURE: 124 MMHG | OXYGEN SATURATION: 98 % | HEIGHT: 66 IN | TEMPERATURE: 98 F

## 2025-05-27 DIAGNOSIS — M25.511 RIGHT SHOULDER PAIN, UNSPECIFIED CHRONICITY: ICD-10-CM

## 2025-05-27 DIAGNOSIS — M25.511 RIGHT SHOULDER PAIN, UNSPECIFIED CHRONICITY: Primary | ICD-10-CM

## 2025-05-27 PROCEDURE — 1125F AMNT PAIN NOTED PAIN PRSNT: CPT | Mod: CPTII,S$GLB,, | Performed by: NURSE PRACTITIONER

## 2025-05-27 PROCEDURE — 73030 X-RAY EXAM OF SHOULDER: CPT | Mod: TC,RT

## 2025-05-27 PROCEDURE — 3288F FALL RISK ASSESSMENT DOCD: CPT | Mod: CPTII,S$GLB,, | Performed by: NURSE PRACTITIONER

## 2025-05-27 PROCEDURE — 99213 OFFICE O/P EST LOW 20 MIN: CPT | Mod: S$GLB,,, | Performed by: NURSE PRACTITIONER

## 2025-05-27 PROCEDURE — 99999 PR PBB SHADOW E&M-EST. PATIENT-LVL V: CPT | Mod: PBBFAC,,, | Performed by: NURSE PRACTITIONER

## 2025-05-27 PROCEDURE — 3078F DIAST BP <80 MM HG: CPT | Mod: CPTII,S$GLB,, | Performed by: NURSE PRACTITIONER

## 2025-05-27 PROCEDURE — 3074F SYST BP LT 130 MM HG: CPT | Mod: CPTII,S$GLB,, | Performed by: NURSE PRACTITIONER

## 2025-05-27 PROCEDURE — 1101F PT FALLS ASSESS-DOCD LE1/YR: CPT | Mod: CPTII,S$GLB,, | Performed by: NURSE PRACTITIONER

## 2025-05-27 PROCEDURE — 1159F MED LIST DOCD IN RCRD: CPT | Mod: CPTII,S$GLB,, | Performed by: NURSE PRACTITIONER

## 2025-05-27 PROCEDURE — 73030 X-RAY EXAM OF SHOULDER: CPT | Mod: 26,RT,, | Performed by: RADIOLOGY

## 2025-05-27 PROCEDURE — 1160F RVW MEDS BY RX/DR IN RCRD: CPT | Mod: CPTII,S$GLB,, | Performed by: NURSE PRACTITIONER

## 2025-05-27 PROCEDURE — 3072F LOW RISK FOR RETINOPATHY: CPT | Mod: CPTII,S$GLB,, | Performed by: NURSE PRACTITIONER

## 2025-05-27 NOTE — TELEPHONE ENCOUNTER
----- Message from Ayah sent at 5/27/2025  8:28 AM CDT -----  Contact: PT  Type:  Same Day Appointment RequestCaller is requesting a same day appointment.  Caller declined first available appointment listed below.  Name of Caller:  PTWhkhalif is the first available appointment?  5/28/25Symptoms:  Symptom: Shoulder Pain - Not From InjuryOutcome: Schedule an appointment to be seen within 24 hours.Reason: Caller denied all higher acuity questionsThe caller rejected this outcome.Best Call Back Number:  [phone] or [phone] Information:  PT  has an appt there today for 10:20 she is bringing him in  would like to be seen at that time. Will be leaving home around 9 am.

## 2025-05-27 NOTE — PROGRESS NOTES
Subjective:       Patient ID: Erica Masterson is a 79 y.o. female.    Chief Complaint: right shoulder pain     HPI   80 y/o female patient with medical problems listed below presents for intermittent right shoulder pain and stiffness for several months. Denies recent trauma to the affected area. Denies chest pain, sob, fever, chills, tinging or numbness. The patient currently uses Tylenol and over the counter pain gel, which provide some relief. She denies progressive or worsening pain.     Problem List[1]     Review of patient's allergies indicates:  No Known Allergies    Past Surgical History:   Procedure Laterality Date    BREAST BIOPSY Left 2015    benign    CHOLECYSTECTOMY      COLONOSCOPY  12/02/2015    Dr. Britton, multiple polyps, recheck five years-three years if more than 2 polyps are adenomatous    COLONOSCOPY N/A 12/02/2015    COLONOSCOPY N/A 03/20/2019    Procedure: COLONOSCOPY;  Surgeon: Jose Britton MD;  Location: [facility];  Service: Endoscopy;  Laterality: N/A;    COLONOSCOPY N/A 05/20/2020    Dr. Britton; internal hemorrhoids; diverticulosis; polyps removed; repeat in 3 years    COLONOSCOPY N/A 11/16/2023    Procedure: COLONOSCOPY(instructions mailed 11/9);  Surgeon: Jose Britton MD;  Location: [facility];  Service: Endoscopy;  Laterality: N/A;    CYSTOSCOPY N/A 08/26/2020    Procedure: CYSTOSCOPY;  Surgeon: Charlotte Walters Jr., MD;  Location: [facility] OR;  Service: Urology;  Laterality: N/A;    DILATION AND CURETTAGE OF UTERUS      DISSECTION OF NECK Left 09/13/2021    Procedure: DISSECTION, NECK;  Surgeon: Ryan Torres MD;  Location: [facility] OR [address] [place] [address];  Service: ENT;  Laterality: Left;    EPIDURAL STEROID INJECTION INTO CERVICAL SPINE N/A 3/27/2024    Procedure: Injection-steroid-epidural-cervical;  Surgeon: Julián Chiang MD;  Location: [facility] OR;  Service: Anesthesiology;  Laterality: N/A;  c7-T1    EPIDURAL STEROID INJECTION INTO LUMBAR SPINE N/A 7/20/2023    Procedure:  Injection-steroid-epidural-lumbar;  Surgeon: Julián Chiang MD;  Location: [facility] OR;  Service: Pain Management;  Laterality: N/A;  L5-s1    ESOPHAGEAL MANOMETRY WITH MEASUREMENT OF IMPEDANCE N/A 08/22/2022    Procedure: MANOMETRY, ESOPHAGUS, WITH IMPEDANCE MEASUREMENT;  Surgeon: Pantera Mcguire MD;  Location: [facility] (4TH FLR);  Service: Endoscopy;  Laterality: N/A;  8/4 fully vaccinated; instructions mailed-st    ESOPHAGOGASTRODUODENOSCOPY N/A 05/20/2020    Dr. Britton; small hiatal hernia; gastritis; 8 gastric polyps removed    ESOPHAGOGASTRODUODENOSCOPY N/A 04/01/2022    Procedure: EGD (ESOPHAGOGASTRODUODENOSCOPY);  Surgeon: Jose Britton MD;  Location: [facility];  Service: Endoscopy;  Laterality: N/A;    FOOT SURGERY      HYSTERECTOMY      TVH/BSO > 20 years    INCISION OF VULVA Bilateral 8/4/2023    Procedure: EXCISION, CYST, LABIA AND OTHER INDICATED PROCEDURES;  Surgeon: Mat Beach MD;  Location: [facility] OR;  Service: OB/GYN;  Laterality: Bilateral;  EXCYSION OF VULVAR CYST    INJECTION, SACROILIAC JOINT Right 12/1/2023    Procedure: INJECTION,SACROILIAC JOINT;  Surgeon: Julián Chiang MD;  Location: [facility] OR;  Service: Anesthesiology;  Laterality: Right;  sacroiliac injection    INTRALUMINAL GASTROINTESTINAL TRACT IMAGING VIA CAPSULE N/A 06/04/2020    Procedure: IMAGING PROCEDURE, GI TRACT, INTRALUMINAL, VIA CAPSULE;  Surgeon: Jose Britton MD;  Location: [facility];  Service: Endoscopy;  Laterality: N/A;    KNEE SURGERY  06/06/2013    right knee tear Dr Iverson     LAPAROSCOPIC CHOLECYSTECTOMY N/A 09/17/2020    Procedure: CHOLECYSTECTOMY, LAPAROSCOPIC;  Surgeon: Forrest Veronica MD;  Location: [facility];  Service: General;  Laterality: N/A;    LUNG LOBECTOMY  1966    right middle lobectomy, due to Tb    OOPHORECTOMY      SHOULDER SURGERY      francis     THYROIDECTOMY Bilateral 05/19/2021    Procedure: THYROIDECTOMY;  Surgeon: Jamia Amezquita MD;  Location: [facility] 2ND FLR;  Service: General;   "Laterality: Bilateral;    THYROIDECTOMY Bilateral 09/13/2021    Procedure: THYROIDECTOMY WITH INTRA-OP PTH;  Surgeon: Ryan Torres MD;  Location: [facility] OR [address] [place] [address];  Service: ENT;  Laterality: Bilateral;  NIM tube  Intraop PTH      Current Medications[2]    Review of Systems   Constitutional:  Negative for chills and fever.   Respiratory:  Negative for cough and shortness of breath.    Cardiovascular:  Negative for chest pain and palpitations.   Musculoskeletal:         Right shoulder pain   Neurological:  Negative for dizziness and headaches.       Objective:   /70 (BP Location: Left arm, Patient Position: Sitting)   Pulse 71   Temp 98 °F (36.7 °C) (Oral)   Ht 5' 6" (1.676 m)   Wt 76.4 kg (168 lb 6.9 oz)   SpO2 98%   BMI 27.19 kg/m²         Physical Exam  Constitutional:       General: She is not in acute distress.     Appearance: Normal appearance.   HENT:      Head: Atraumatic.   Cardiovascular:      Rate and Rhythm: Normal rate and regular rhythm.      Pulses: Normal pulses.      Heart sounds: Normal heart sounds.   Pulmonary:      Effort: Pulmonary effort is normal.      Breath sounds: Normal breath sounds.   Abdominal:      General: Abdomen is flat. Bowel sounds are normal.      Tenderness: There is no abdominal tenderness.   Musculoskeletal:      Right shoulder: Tenderness present. No swelling. Normal range of motion.   Neurological:      Mental Status: She is alert.         Assessment:       1. Right shoulder pain, unspecified chronicity        Plan:       1. Right shoulder pain, unspecified chronicity (Primary)  Likely DJD  - X-ray Shoulder 2 or More Views Right; Future   - Continue with tylenol as needed     Will follow up result  Raman NP         [1]   Patient Active Problem List  Diagnosis    Diverticulosis    Myopathy, unspecified    Depression    Chronic low back pain    Degenerative arthritis of knee    Hereditary and idiopathic peripheral neuropathy    PAD (peripheral artery disease)    " GERD (gastroesophageal reflux disease)    Cardiovascular event risk, ASCVD 10-year risk 13%, 2010    Hypertensive left ventricular hypertrophy, without heart failure    SI (sacroiliac) joint dysfunction    PAC (premature atrial contraction), frequent on Holter, 34%, over 33,200    Atrial septal aneurysm    Sleep disorder    History of colon polyps    Iron deficiency anemia    RONNIE (generalized anxiety disorder), 2019    Elevated ALT measurement    Stage 3a chronic kidney disease    Biliary colic    Age-related osteoporosis without current pathological fracture    Vitamin D insufficiency    Thyroid cancer    Postoperative hypothyroidism    Spinal accessory nerve disorder    Decreased range of motion of intervertebral discs of cervical spine    Muscle spasms of both lower extremities    Osteopenia of lumbar spine    Hypocalcemia    Decreased strength of upper extremity    Hypoparathyroidism, unspecified hypoparathyroidism type    Polyneuropathy    Lumbar radiculopathy, chronic    Range of motion deficit    Tachycardia   [2]   Current Outpatient Medications:     amLODIPine (NORVASC) 2.5 MG tablet, Take 1 tablet by mouth once daily for blood pressure, Disp: 90 tablet, Rfl: 2    artificial tears (ISOPTO TEARS) 0.5 % ophthalmic solution, 1 drop as needed., Disp: , Rfl:     blood sugar diagnostic (BLOOD GLUCOSE TEST) Strp, True Metrix glucose strips check once daily, Disp: 100 each, Rfl: 3    co-enzyme Q-10 30 mg capsule, Take 30 mg by mouth once daily., Disp: , Rfl:     donepeziL (ARICEPT) 10 MG tablet, Take 1 tablet (10 mg total) by mouth every evening., Disp: 30 tablet, Rfl: 11    doxepin (SINEQUAN) 25 MG capsule, Take 1 capsule (25 mg total) by mouth every evening., Disp: 90 capsule, Rfl: 1    ergocalciferol (ERGOCALCIFEROL) 50,000 unit Cap, Take one capsule twice monthly., Disp: 6 capsule, Rfl: 3    fluticasone propionate (FLONASE) 50 mcg/actuation nasal spray, Use 1 spray(s) in each nostril once daily, Disp: 16 g,  Rfl: 10    ibuprofen (ADVIL,MOTRIN) 600 MG tablet, Take 1 tablet (600 mg total) by mouth every 6 (six) hours as needed for Pain., Disp: 30 tablet, Rfl: 0    levothyroxine (SYNTHROID) 88 MCG tablet, Take 1 tablet (88 mcg total) by mouth before breakfast., Disp: 30 tablet, Rfl: 11    losartan (COZAAR) 100 MG tablet, Take 1 tablet by mouth once daily, Disp: 90 tablet, Rfl: 0    rosuvastatin (CRESTOR) 10 MG tablet, Take 1 tablet by mouth once daily, Disp: 90 tablet, Rfl: 1    thiamine 100 MG tablet, Take 1 tablet (100 mg total) by mouth once daily., Disp: 30 tablet, Rfl: 5    tiZANidine (ZANAFLEX) 2 MG tablet, TAKE 2 TABLETS BY MOUTH EVERY 8 HOURS AS NEEDED FOR PAIN /SPASM, Disp: 30 tablet, Rfl: 2    blood-glucose meter kit, True Metrix glucometer check glucose once daily, Disp: 1 each, Rfl: 0    loratadine (CLARITIN) 10 mg tablet, Take 1 tablet (10 mg total) by mouth once daily., Disp: 30 tablet, Rfl: 5  No current facility-administered medications for this visit.    Facility-Administered Medications Ordered in Other Visits:     lactated ringers infusion, , Intravenous, Continuous, Julián Chiang MD, Last Rate: 25 mL/hr at 03/27/24 1200, New Bag at 03/27/24 1200    LIDOcaine (PF) 10 mg/ml (1%) injection 10 mg, 1 mL, Intradermal, Once, Julián Chiang MD

## 2025-05-27 NOTE — TELEPHONE ENCOUNTER
Patient c/o R eddieulder pain. Scheduled to see VIKA Jean 05/27/25 st 10:40 AM. Verbalized understanding

## 2025-06-09 DIAGNOSIS — I10 HYPERTENSION, ESSENTIAL: ICD-10-CM

## 2025-06-10 RX ORDER — LOSARTAN POTASSIUM 100 MG/1
100 TABLET ORAL
Qty: 30 TABLET | Refills: 0 | Status: SHIPPED | OUTPATIENT
Start: 2025-06-10

## 2025-07-03 ENCOUNTER — LAB VISIT (OUTPATIENT)
Dept: LAB | Facility: HOSPITAL | Age: 80
End: 2025-07-03
Attending: INTERNAL MEDICINE
Payer: MEDICARE

## 2025-07-03 DIAGNOSIS — M85.80 OSTEOPENIA, UNSPECIFIED LOCATION: ICD-10-CM

## 2025-07-03 DIAGNOSIS — E89.0 POSTOPERATIVE HYPOTHYROIDISM: ICD-10-CM

## 2025-07-03 DIAGNOSIS — C73 THYROID CANCER: ICD-10-CM

## 2025-07-03 LAB
T4 FREE SERPL-MCNC: 1.21 NG/DL (ref 0.71–1.51)
TSH SERPL-ACNC: 0.07 UIU/ML (ref 0.4–4)

## 2025-07-03 PROCEDURE — 36415 COLL VENOUS BLD VENIPUNCTURE: CPT

## 2025-07-03 PROCEDURE — 84439 ASSAY OF FREE THYROXINE: CPT

## 2025-07-03 PROCEDURE — 84432 ASSAY OF THYROGLOBULIN: CPT

## 2025-07-05 LAB
ENDOCRINOLOGIST REVIEW: NORMAL
THYROGLOB AB SERPL IA-ACNC: <1.8 IU/ML
THYROGLOB SERPL-MCNC: 6.8 NG/ML

## 2025-07-06 DIAGNOSIS — I10 HYPERTENSION, ESSENTIAL: ICD-10-CM

## 2025-07-07 RX ORDER — LOSARTAN POTASSIUM 100 MG/1
100 TABLET ORAL
Qty: 30 TABLET | Refills: 0 | Status: SHIPPED | OUTPATIENT
Start: 2025-07-07

## 2025-07-09 DIAGNOSIS — E89.0 POSTOPERATIVE HYPOTHYROIDISM: Primary | ICD-10-CM

## 2025-07-09 RX ORDER — LEVOTHYROXINE SODIUM 88 UG/1
TABLET ORAL
Qty: 90 TABLET | Refills: 3 | Status: SHIPPED | OUTPATIENT
Start: 2025-07-09

## 2025-07-10 ENCOUNTER — TELEPHONE (OUTPATIENT)
Dept: ENDOCRINOLOGY | Facility: CLINIC | Age: 80
End: 2025-07-10
Payer: MEDICARE

## 2025-07-11 ENCOUNTER — OFFICE VISIT (OUTPATIENT)
Dept: ENDOCRINOLOGY | Facility: CLINIC | Age: 80
End: 2025-07-11
Payer: MEDICARE

## 2025-07-11 VITALS
BODY MASS INDEX: 27.28 KG/M2 | SYSTOLIC BLOOD PRESSURE: 132 MMHG | DIASTOLIC BLOOD PRESSURE: 66 MMHG | OXYGEN SATURATION: 97 % | WEIGHT: 169.75 LBS | HEIGHT: 66 IN | HEART RATE: 83 BPM

## 2025-07-11 DIAGNOSIS — C73 THYROID CANCER: ICD-10-CM

## 2025-07-11 DIAGNOSIS — M85.80 OSTEOPENIA, UNSPECIFIED LOCATION: ICD-10-CM

## 2025-07-11 DIAGNOSIS — Z78.0 POSTMENOPAUSAL: ICD-10-CM

## 2025-07-11 DIAGNOSIS — E89.0 POSTOPERATIVE HYPOTHYROIDISM: Primary | ICD-10-CM

## 2025-07-11 PROCEDURE — 99999 PR PBB SHADOW E&M-EST. PATIENT-LVL IV: CPT | Mod: PBBFAC,,, | Performed by: INTERNAL MEDICINE

## 2025-07-11 NOTE — PROGRESS NOTES
"  CHIEF COMPLAINT:  Papillary thyroid cancer  79 y.o. old being seen as a f/u.  Patient had FNA of a left nodule that showed papillary thyroid cancer.  Subsequently had a total thyroidectomy in May of 2021.  She had a postoperative thyroglobulin that was high at 91.  Had a repeat surgery in September of 2021.  For 2nd surgery-  Lymph node positive on left level 2A, left level 3, left level 4 and paratracheal lymph node. Then treated with radioactive iodine-168 mCi in December of 2021.  Posttreatment whole-body scan showed activity in the neck.  She had a negative PET scan in June of 2023.    On synthroid 88 mcg daily. No palpitations. No tremors. No diarrhea or constipation. No dysphagia. NO heat/cold intolerance. No anxiety. No insomnia. Exercises 3 days a week.       4/29/21  1. FNA Thyroid, Left mid lat:   [facility] Thyroid Cytology Category: Malignant  Papillary thyroid   carcinoma.   Other findings and comments:   Macrophages.   2. FNA Thyroid, Left mid med:   [facility] Thyroid Cytology Category: Benign   Other findings and comments:   Macrophages.     Total thyroidectomy on May 19, 2021  SYNOPTIC REPORT  Procedure - Total thyroidectomy  Lymph node sampling ¿ Not performed  Tumor focality ¿ Multifocal  Tumor laterality ¿ Left lobe  Tumor size ¿ PTC, classic variant - 2.0 cm; 4 separate foci of PTC,  follicular variant range in size from 2-5mm  Histologic type - Papillary carcinoma, classic and follicular variant  Margins ¿ Positive for PTC, classic variant, multifocal (posterior aspect)  Angioinvasion ¿ Not identified  Lymphatic invasion ¿ Not identified  Extrathyroidal extension - Not identified (cannot be adequately evaluated due  to margin positivity)  Pathologic staging  ¿ pT1 Nx Mx     Completion surgery on 09/13/2021    1. Skin and soft tissue, "scar tissue neck", excision:  - Benign skin and soft tissue with surgical site changes  2. Lymph node, left neck, level 5, excision:  - Two lymph " "nodes negative for malignancy (0/2)  3. 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 Lymph node, left neck, level 4, excision:  - Two of fourteen lymph nodes positive for metastatic carcinoma (2/14)  6. Soft tissue, "tissue by straps", excision:  - Benign fibrovascular adipose tissue and skeletal muscle with surgical site  changes  - Negative for malignancy  7. "Completion thyroidectomy and paratracheal lymph node dissection",  excision:  - One of two lymph nodes positive for metastatic papillary thyroid carcinoma  (1/2)  - Fragments of skeletal muscle with surgical site changes  - Fragments of unremarkable thymic tissue  - No definitive thyroid parenchyma identified         PAST MEDICAL HISTORY/PAST SURGICAL HISTORY:  Reviewed in [facility]    SOCIAL HISTORY: Reviewed in [facility]    FAMILY HISTORY:  Mother had unknown thyroid condition. No known thyroid cancer. + DM 2.     MEDICATIONS/ALLERGIES: The patient's MedCard has been updated and reviewed.            PE:    GENERAL: Well developed, well nourished.  NECK: Supple, trachea midline, no palpable thyroid nodules  CHEST: Resp even and unlabored, CTA bilateral.  CARDIAC: RRR, S1, S2 heard, no murmurs, rubs, S3, or S4    LABS/Radiology     Latest Reference Range & Units 07/03/25 08:25   TSH 0.400 - 4.000 uIU/mL 0.069 (L)   Free T4 0.71 - 1.51 ng/dL 1.21   (L): Data is abnormally low   Latest Reference Range & Units 07/03/25 08:25   Thyroglobulin Antibody, S <1.8 IU/mL <1.8   Thyroglobulin, Tumor Marker, S < or = 33 ng/mL 6.8       ASSESSMENT/PLAN:  1. Papillary thyroid cancer-initially had surgery in May of 2021.  Postoperative thyroglobulin was 91.  Therefore had a repeat surgery in September of 2021.  Subsequently had 168 mCi of radioactive iodine.  " Posttreatment whole-body scan showed activity in the thyroid bed.  Since then her thyroglobulin has been detectable.  1 time her TSH increased as well as her thyroglobulin.  Had a PET scan which was negative.  Her TSH has been detectable at approximately 7.  Does not appear to be going up.  Thyroglobulin appears stable.  Continue to monitor.  Check thyroid ultrasound at follow up.    2.  Hypothyroidism-she does appear to be taking her thyroid medication appropriately.  We will need to suppress her TSH because of detectable thyroglobulin.  Discussed hyperthyroid symptoms to monitor for.  Discussed risks of a suppressed TSH including heart arrhythmias and bone loss.      3.  Osteopenia-discussed suppressed TSH can worsen osteopenia.  She is due for  another bone density in November of 2025.  She is taking calcium and vitamin-D.  She is doing weight-bearing exercise.  No fracture history.  Check bone density at follow up      FOLLOWUP  F/U 6 months with TSH, Tg, thyroid Ultrasound, DXA

## 2025-07-14 DIAGNOSIS — M17.11 PRIMARY OSTEOARTHRITIS OF RIGHT KNEE: Primary | ICD-10-CM

## 2025-07-25 ENCOUNTER — HOSPITAL ENCOUNTER (OUTPATIENT)
Dept: RADIOLOGY | Facility: HOSPITAL | Age: 80
Discharge: HOME | End: 2025-07-25
Attending: STUDENT IN AN ORGANIZED HEALTH CARE EDUCATION/TRAINING PROGRAM
Payer: MEDICARE

## 2025-07-25 ENCOUNTER — OFFICE VISIT (OUTPATIENT)
Dept: FAMILY MEDICINE | Facility: CLINIC | Age: 80
End: 2025-07-25
Payer: MEDICARE

## 2025-07-25 ENCOUNTER — LAB VISIT (OUTPATIENT)
Dept: LAB | Facility: HOSPITAL | Age: 80
End: 2025-07-25
Payer: MEDICARE

## 2025-07-25 VITALS
BODY MASS INDEX: 27.32 KG/M2 | DIASTOLIC BLOOD PRESSURE: 68 MMHG | SYSTOLIC BLOOD PRESSURE: 122 MMHG | HEART RATE: 62 BPM | OXYGEN SATURATION: 98 % | HEIGHT: 66 IN | TEMPERATURE: 98 F | WEIGHT: 170 LBS | RESPIRATION RATE: 12 BRPM

## 2025-07-25 DIAGNOSIS — E11.59 HYPERTENSION ASSOCIATED WITH DIABETES: ICD-10-CM

## 2025-07-25 DIAGNOSIS — I15.2 HYPERTENSION ASSOCIATED WITH DIABETES: ICD-10-CM

## 2025-07-25 DIAGNOSIS — E78.5 HYPERLIPIDEMIA ASSOCIATED WITH TYPE 2 DIABETES MELLITUS: ICD-10-CM

## 2025-07-25 DIAGNOSIS — E11.9 DIABETES MELLITUS WITHOUT COMPLICATION: ICD-10-CM

## 2025-07-25 DIAGNOSIS — E11.69 HYPERLIPIDEMIA ASSOCIATED WITH TYPE 2 DIABETES MELLITUS: ICD-10-CM

## 2025-07-25 DIAGNOSIS — R92.8 ABNORMAL MAMMOGRAM: ICD-10-CM

## 2025-07-25 DIAGNOSIS — E89.0 POSTOPERATIVE HYPOTHYROIDISM: ICD-10-CM

## 2025-07-25 DIAGNOSIS — R41.3 MEMORY CHANGES: ICD-10-CM

## 2025-07-25 DIAGNOSIS — N18.31 STAGE 3A CHRONIC KIDNEY DISEASE: ICD-10-CM

## 2025-07-25 DIAGNOSIS — E11.00 TYPE 2 DIABETES MELLITUS WITH HYPEROSMOLARITY WITHOUT COMA, WITHOUT LONG-TERM CURRENT USE OF INSULIN: Primary | ICD-10-CM

## 2025-07-25 DIAGNOSIS — E11.00 TYPE 2 DIABETES MELLITUS WITH HYPEROSMOLARITY WITHOUT COMA, WITHOUT LONG-TERM CURRENT USE OF INSULIN: ICD-10-CM

## 2025-07-25 LAB
ALBUMIN SERPL BCP-MCNC: 3.8 G/DL (ref 3.5–5.2)
ALBUMIN/CREAT UR: 4.4 UG/MG
ALP SERPL-CCNC: 54 UNIT/L (ref 40–150)
ALT SERPL W/O P-5'-P-CCNC: 19 UNIT/L (ref 0–55)
ANION GAP (OHS): 9 MMOL/L (ref 8–16)
AST SERPL-CCNC: 27 UNIT/L (ref 0–50)
BILIRUB SERPL-MCNC: 0.6 MG/DL (ref 0.1–1)
BUN SERPL-MCNC: 18 MG/DL (ref 8–23)
CALCIUM SERPL-MCNC: 8.6 MG/DL (ref 8.7–10.5)
CHLORIDE SERPL-SCNC: 107 MMOL/L (ref 95–110)
CO2 SERPL-SCNC: 26 MMOL/L (ref 23–29)
CREAT SERPL-MCNC: 1.2 MG/DL (ref 0.5–1.4)
CREAT UR-MCNC: 135 MG/DL (ref 15–325)
EAG (OHS): 123 MG/DL (ref 68–131)
GFR SERPLBLD CREATININE-BSD FMLA CKD-EPI: 46 ML/MIN/1.73/M2
GLUCOSE SERPL-MCNC: 82 MG/DL (ref 70–110)
HBA1C MFR BLD: 5.9 % (ref 4–5.6)
MICROALBUMIN UR-MCNC: 6 UG/ML (ref ?–5000)
POTASSIUM SERPL-SCNC: 4.4 MMOL/L (ref 3.5–5.1)
PROT SERPL-MCNC: 7.4 GM/DL (ref 6–8.4)
SODIUM SERPL-SCNC: 142 MMOL/L (ref 136–145)

## 2025-07-25 PROCEDURE — 77062 BREAST TOMOSYNTHESIS BI: CPT | Mod: 26,,, | Performed by: RADIOLOGY

## 2025-07-25 PROCEDURE — 83036 HEMOGLOBIN GLYCOSYLATED A1C: CPT

## 2025-07-25 PROCEDURE — 99999 PR PBB SHADOW E&M-EST. PATIENT-LVL IV: CPT | Mod: PBBFAC,,,

## 2025-07-25 PROCEDURE — 80053 COMPREHEN METABOLIC PANEL: CPT

## 2025-07-25 PROCEDURE — 77062 BREAST TOMOSYNTHESIS BI: CPT | Mod: TC

## 2025-07-25 PROCEDURE — 36415 COLL VENOUS BLD VENIPUNCTURE: CPT | Mod: PO

## 2025-07-25 PROCEDURE — 77066 DX MAMMO INCL CAD BI: CPT | Mod: 26,,, | Performed by: RADIOLOGY

## 2025-07-25 PROCEDURE — 82043 UR ALBUMIN QUANTITATIVE: CPT

## 2025-07-25 NOTE — PROGRESS NOTES
Subjective:       Patient ID:  [id number]     Chief Complaint: Follow-up      History of Present Illness    Ms. Masterson presents today for six-month follow-up She continues donepezil 10 mg for memory management and reports it is helpful. She continues to follow with endocrinology for ongoing monitoring and with neurology for memory management.          Past Medical History:   Diagnosis Date    Adenomatous polyp of colon 3/26/2012    Allergy     Anxiety     Cancer     Cataract     Colon polyp     Degenerative arthritis of knee 04/03/2012    Diverticulosis     Encounter for blood transfusion     GERD (gastroesophageal reflux disease)     History of thyroid cancer 2021    Hyperlipidemia     Hypertension     Lateral meniscal tear 5/20/2013    Multinodular goiter 03/26/2012    Myopathy, unspecified 01/18/2010    Tuberculosis       Active Problem List with Overview Notes    Diagnosis Date Noted    Tachycardia 05/02/2024    Range of motion deficit 03/15/2024    Polyneuropathy 03/04/2024    Lumbar radiculopathy, chronic 03/04/2024    Hypoparathyroidism, unspecified hypoparathyroidism type 02/16/2024    Decreased strength of upper extremity 09/16/2022    Hypocalcemia 08/06/2022    Osteopenia of lumbar spine 03/15/2022    Decreased range of motion of intervertebral discs of cervical spine 01/24/2022    Muscle spasms of both lower extremities 01/24/2022    Spinal accessory nerve disorder 01/20/2022    Postoperative hypothyroidism 07/29/2021    Thyroid cancer 05/19/2021    Vitamin D insufficiency 04/28/2021    Age-related osteoporosis without current pathological fracture 03/30/2021    Biliary colic 09/17/2020    RONNIE (generalized anxiety disorder), 2019 08/03/2020    Elevated ALT measurement 08/03/2020    Stage 3a chronic kidney disease 08/03/2020    Iron deficiency anemia 05/20/2020    History of colon polyps 03/20/2019    PAC (premature atrial contraction), frequent on Holter, 34%, over 33,200 11/08/2018    Atrial septal  aneurysm 11/08/2018    Sleep disorder 11/08/2018    SI (sacroiliac) joint dysfunction 10/19/2017    Hypertensive left ventricular hypertrophy, without heart failure 10/17/2017    Cardiovascular event risk, ASCVD 10-year risk 13%, 2010 06/30/2016    GERD (gastroesophageal reflux disease) 05/28/2015    PAD (peripheral artery disease) 01/30/2014    Hereditary and idiopathic peripheral neuropathy 10/08/2012    Degenerative arthritis of knee 04/03/2012    Depression 03/26/2012    Chronic low back pain 03/26/2012    Diverticulosis 12/14/2011    Myopathy, unspecified 01/18/2010      Review of patient's allergies indicates:  No Known Allergies        Lab Results   Component Value Date    WBC 5.10 01/24/2025    HGB 11.9 (L) 01/24/2025    HCT 37.8 01/24/2025     01/24/2025    CHOL 176 01/24/2025    TRIG 59 01/24/2025    HDL 65 01/24/2025    ALT 13 01/24/2025    AST 19 01/24/2025     01/24/2025    K 4.1 01/24/2025     01/24/2025    CREATININE 1.1 01/24/2025    BUN 18 01/24/2025    CO2 25 01/24/2025    TSH 0.069 (L) 07/03/2025    INR 1.0 08/16/2022    GLUF 104 05/22/2004    HGBA1C 6.0 (H) 01/24/2025       Review of Systems   Constitutional:  Negative for fatigue and fever.   HENT: Negative.  Negative for congestion, sneezing and sore throat.    Eyes: Negative.    Respiratory:  Negative for cough, shortness of breath and wheezing.    Cardiovascular:  Negative for chest pain, palpitations and leg swelling.   Gastrointestinal:  Negative for abdominal pain, nausea and vomiting.   Genitourinary: Negative.    Musculoskeletal: Negative.  Negative for arthralgias.   Skin: Negative.  Negative for rash.   Neurological:  Negative for dizziness, weakness, light-headedness, numbness and headaches.   Hematological: Negative.    Psychiatric/Behavioral: Negative.         Objective:      Physical Exam  Constitutional:       Appearance: Normal appearance.   HENT:      Head: Normocephalic and atraumatic.      Nose: Nose normal.    Eyes:      Extraocular Movements: Extraocular movements intact.   Cardiovascular:      Rate and Rhythm: Normal rate and regular rhythm.   Pulmonary:      Effort: Pulmonary effort is normal.      Breath sounds: Normal breath sounds.   Musculoskeletal:         General: Normal range of motion.      Cervical back: Normal range of motion.   Skin:     General: Skin is warm and dry.   Neurological:      General: No focal deficit present.      Mental Status: She is alert and oriented to person, place, and time.   Psychiatric:         Mood and Affect: Mood normal.         Assessment:       1. Type 2 diabetes mellitus with hyperosmolarity without coma, without long-term current use of insulin    2. Hyperlipidemia associated with type 2 diabetes mellitus, baseline     3. Stage 3a chronic kidney disease    4. Hypertension associated with diabetes    5. Postoperative hypothyroidism    6. Memory changes        Plan:       Assessment & Plan    IMPRESSION:  - Reviewed history and previous lab work from Dr. Rivera.  - Deferred decision on increasing donepezil dosage to neurology.       Erica was seen today for follow-up.    Diagnoses and all orders for this visit:    Type 2 diabetes mellitus with hyperosmolarity without coma, without long-term current use of insulin  -     Hemoglobin A1C; Future  -     Comprehensive Metabolic Panel; Future  - continue current regimen  - Most recent A1C: 6.0  - CMP : ordered   - Micro: ordered   - discussed medication compliance   - yearly eye exam recommended  - LDL goal <70  - discussed importance of frequent foot exam     Hyperlipidemia associated with type 2 diabetes mellitus, baseline   - continue current regimen  - low fat/high fiber diet recommended   - yearly fasting lipid panel recommended      Stage 3a chronic kidney disease  - cmp as ordered     Hypertension associated with diabetes  - well controlled today  - discussed dash diet, exercise/weight loss, and increased  cardiovascular exercise   - monitor BP at home w/ goal <130/80    Postoperative hypothyroidism  Continue to follow-up with endocrinology    Memory changes  - continue to f/u with neurology              Future Appointments       Date Provider Specialty Appt Notes    7/30/2025  Radiology R KNEE    7/30/2025 Jacinto Palm MD Orthopedics TO XRAY// Right knee pain    8/19/2025 Wayne Michelle MD Ophthalmology annual    12/29/2025  Radiology     1/2/2026 Jean Daugherty DO Endocrinology 6 mo f/u    1/22/2026 Blanca Rivera MD Family Medicine annual               Portions of this note may have been dictated using voice recognition software and may contain dictation related errors in spelling / grammar / syntax not discovered on text review.     This note was generated with the assistance of ambient listening technology. Verbal consent was obtained by the patient and accompanying visitor(s) for the recording of patient appointment to facilitate this note. I attest to having reviewed and edited the generated note for accuracy, though some syntax or spelling errors may persist. Please contact the author of this note for any clarification.       Sandi Orellana PA-C

## 2025-07-28 DIAGNOSIS — E78.5 HYPERLIPIDEMIA ASSOCIATED WITH TYPE 2 DIABETES MELLITUS: ICD-10-CM

## 2025-07-28 DIAGNOSIS — E11.69 HYPERLIPIDEMIA ASSOCIATED WITH TYPE 2 DIABETES MELLITUS: ICD-10-CM

## 2025-07-28 NOTE — TELEPHONE ENCOUNTER
Copied from CRM #[id number]. Topic: Medications - Medication Refill  >> Jul 28, 2025  3:36 PM Ashwin wrote:  Type:  RX Refill Request    Who Called: Pt   Refill or New Rx:refill  RX Name and Strength:rosuvastatin (CRESTOR) 10 MG tablet   How is the patient currently taking it? (ex. 1XDay): 1 x  Is this a 30 day or 90 day RX: 90  Preferred Pharmacy with phone number:  [id number] - [place], [place] - [address]  [place] [address]  Phone: [phone] Fax: [phone]  Local or Mail Order:local  Ordering Provider:Nora  Would the patient rather a call back or a response via [facility]? Call  Best Call Back Number:[phone]  Additional Information: Pt req refill. Thank you.

## 2025-07-29 ENCOUNTER — TELEPHONE (OUTPATIENT)
Dept: FAMILY MEDICINE | Facility: CLINIC | Age: 80
End: 2025-07-29
Payer: MEDICARE

## 2025-07-29 RX ORDER — ROSUVASTATIN CALCIUM 10 MG/1
10 TABLET, COATED ORAL NIGHTLY
Qty: 30 TABLET | Refills: 0 | Status: SHIPPED | OUTPATIENT
Start: 2025-07-29

## 2025-07-29 NOTE — TELEPHONE ENCOUNTER
Spoke with Patient - Lab results given, per Dr. Rivera. Pt verbalized understanding.     ----- Message from Blanca Rivera MD sent at 7/27/2025  2:53 PM CDT -----  Please let the patient know that her labs are stable -- this includes protein content in urine, prediabetes number, and her kidney function.    Kidney function continues to stay low but stable.  Advise her to avoid nephrotoxic meds such as ibuprofen/advil, aleve, naproxen, meloxicam. Thank you.   ----- Message -----  From: Lab, Background User  Sent: 7/25/2025   8:36 PM CDT  To: Blanca Rivera MD

## 2025-07-31 ENCOUNTER — TELEPHONE (OUTPATIENT)
Dept: FAMILY MEDICINE | Facility: CLINIC | Age: 80
End: 2025-07-31
Payer: MEDICARE

## 2025-07-31 NOTE — TELEPHONE ENCOUNTER
Copied from CRM #[id number]. Topic: General Inquiry - Return Call  >> Jul 31, 2025  7:15 AM Justine wrote:  Type:  Patient Returning Call    Who Called:pt    Who Left Message for Patient: michelle    Does the patient know what this is regarding?: unknown    Would the patient rather a call back or a response via [facility]?  Call back    Best Call Back Number:     Additional Information: please call to advise    Thanks

## 2025-08-15 ENCOUNTER — OFFICE VISIT (OUTPATIENT)
Dept: ORTHOPEDICS | Facility: CLINIC | Age: 80
End: 2025-08-15
Payer: MEDICARE

## 2025-08-15 ENCOUNTER — HOSPITAL ENCOUNTER (OUTPATIENT)
Dept: RADIOLOGY | Facility: HOSPITAL | Age: 80
Discharge: HOME | End: 2025-08-15
Attending: ORTHOPAEDIC SURGERY
Payer: MEDICARE

## 2025-08-15 VITALS — WEIGHT: 169 LBS | HEIGHT: 66 IN | BODY MASS INDEX: 27.16 KG/M2

## 2025-08-15 DIAGNOSIS — M17.11 PRIMARY OSTEOARTHRITIS OF RIGHT KNEE: Primary | ICD-10-CM

## 2025-08-15 DIAGNOSIS — Z01.818 PRE-OP TESTING: ICD-10-CM

## 2025-08-15 DIAGNOSIS — M17.11 PRIMARY OSTEOARTHRITIS OF RIGHT KNEE: ICD-10-CM

## 2025-08-15 PROCEDURE — 73562 X-RAY EXAM OF KNEE 3: CPT | Mod: TC,PO,LT

## 2025-08-15 PROCEDURE — 99999 PR PBB SHADOW E&M-EST. PATIENT-LVL III: CPT | Mod: PBBFAC,,, | Performed by: ORTHOPAEDIC SURGERY

## 2025-08-15 PROCEDURE — 73564 X-RAY EXAM KNEE 4 OR MORE: CPT | Mod: 26,RT,, | Performed by: RADIOLOGY

## 2025-08-15 PROCEDURE — 73562 X-RAY EXAM OF KNEE 3: CPT | Mod: 26,LT,, | Performed by: RADIOLOGY

## 2025-08-15 RX ORDER — MUPIROCIN 20 MG/G
OINTMENT TOPICAL
OUTPATIENT
Start: 2025-08-15

## 2025-08-16 DIAGNOSIS — E11.69 HYPERLIPIDEMIA ASSOCIATED WITH TYPE 2 DIABETES MELLITUS: ICD-10-CM

## 2025-08-16 DIAGNOSIS — E78.5 HYPERLIPIDEMIA ASSOCIATED WITH TYPE 2 DIABETES MELLITUS: ICD-10-CM

## 2025-08-19 ENCOUNTER — TELEPHONE (OUTPATIENT)
Dept: ORTHOPEDICS | Facility: CLINIC | Age: 80
End: 2025-08-19
Payer: MEDICARE

## 2025-08-19 ENCOUNTER — OFFICE VISIT (OUTPATIENT)
Dept: FAMILY MEDICINE | Facility: CLINIC | Age: 80
End: 2025-08-19
Payer: MEDICARE

## 2025-08-19 ENCOUNTER — OFFICE VISIT (OUTPATIENT)
Dept: OPHTHALMOLOGY | Facility: CLINIC | Age: 80
End: 2025-08-19
Payer: MEDICARE

## 2025-08-19 VITALS
HEART RATE: 68 BPM | BODY MASS INDEX: 27.42 KG/M2 | DIASTOLIC BLOOD PRESSURE: 58 MMHG | OXYGEN SATURATION: 97 % | SYSTOLIC BLOOD PRESSURE: 136 MMHG | HEIGHT: 66 IN | RESPIRATION RATE: 18 BRPM | WEIGHT: 170.63 LBS

## 2025-08-19 DIAGNOSIS — E11.9 DIABETES MELLITUS TYPE 2 WITHOUT RETINOPATHY: Primary | ICD-10-CM

## 2025-08-19 DIAGNOSIS — H40.013 OPEN ANGLE WITH BORDERLINE FINDINGS AND LOW GLAUCOMA RISK IN BOTH EYES: ICD-10-CM

## 2025-08-19 DIAGNOSIS — H25.813 COMBINED FORMS OF AGE-RELATED CATARACT, BILATERAL: ICD-10-CM

## 2025-08-19 DIAGNOSIS — H04.123 DRY EYE SYNDROME, BILATERAL: ICD-10-CM

## 2025-08-19 DIAGNOSIS — M17.11 PRIMARY OSTEOARTHRITIS OF RIGHT KNEE: Primary | ICD-10-CM

## 2025-08-19 PROCEDURE — 3288F FALL RISK ASSESSMENT DOCD: CPT | Mod: CPTII,S$GLB,,

## 2025-08-19 PROCEDURE — 1160F RVW MEDS BY RX/DR IN RCRD: CPT | Mod: CPTII,S$GLB,,

## 2025-08-19 PROCEDURE — 1101F PT FALLS ASSESS-DOCD LE1/YR: CPT | Mod: CPTII,S$GLB,,

## 2025-08-19 PROCEDURE — 92014 COMPRE OPH EXAM EST PT 1/>: CPT | Mod: S$GLB,,, | Performed by: OPHTHALMOLOGY

## 2025-08-19 PROCEDURE — G2211 COMPLEX E/M VISIT ADD ON: HCPCS | Mod: S$GLB,,,

## 2025-08-19 PROCEDURE — 99213 OFFICE O/P EST LOW 20 MIN: CPT | Mod: S$GLB,,,

## 2025-08-19 PROCEDURE — 1126F AMNT PAIN NOTED NONE PRSNT: CPT | Mod: CPTII,S$GLB,, | Performed by: OPHTHALMOLOGY

## 2025-08-19 PROCEDURE — 99999 PR PBB SHADOW E&M-EST. PATIENT-LVL IV: CPT | Mod: PBBFAC,,,

## 2025-08-19 PROCEDURE — 3078F DIAST BP <80 MM HG: CPT | Mod: CPTII,S$GLB,,

## 2025-08-19 PROCEDURE — 3075F SYST BP GE 130 - 139MM HG: CPT | Mod: CPTII,S$GLB,,

## 2025-08-19 PROCEDURE — 1126F AMNT PAIN NOTED NONE PRSNT: CPT | Mod: CPTII,S$GLB,,

## 2025-08-19 PROCEDURE — 2023F DILAT RTA XM W/O RTNOPTHY: CPT | Mod: CPTII,S$GLB,, | Performed by: OPHTHALMOLOGY

## 2025-08-19 PROCEDURE — 1159F MED LIST DOCD IN RCRD: CPT | Mod: CPTII,S$GLB,,

## 2025-08-19 PROCEDURE — 99999 PR PBB SHADOW E&M-EST. PATIENT-LVL III: CPT | Mod: PBBFAC,,, | Performed by: OPHTHALMOLOGY

## 2025-08-19 PROCEDURE — 92133 CPTRZD OPH DX IMG PST SGM ON: CPT | Mod: S$GLB,,, | Performed by: OPHTHALMOLOGY

## 2025-08-19 PROCEDURE — 1159F MED LIST DOCD IN RCRD: CPT | Mod: CPTII,S$GLB,, | Performed by: OPHTHALMOLOGY

## 2025-08-19 RX ORDER — ROSUVASTATIN CALCIUM 10 MG/1
TABLET, COATED ORAL
Qty: 30 TABLET | Refills: 0 | Status: SHIPPED | OUTPATIENT
Start: 2025-08-19

## 2025-08-21 ENCOUNTER — TELEPHONE (OUTPATIENT)
Dept: ORTHOPEDICS | Facility: CLINIC | Age: 80
End: 2025-08-21
Payer: MEDICARE

## 2025-08-22 DIAGNOSIS — I10 HYPERTENSION, ESSENTIAL: ICD-10-CM

## 2025-08-22 RX ORDER — LOSARTAN POTASSIUM 100 MG/1
100 TABLET ORAL
Qty: 30 TABLET | Refills: 0 | Status: SHIPPED | OUTPATIENT
Start: 2025-08-22

## 2025-09-05 ENCOUNTER — OFFICE VISIT (OUTPATIENT)
Dept: CARDIOLOGY | Facility: CLINIC | Age: 80
End: 2025-09-05
Payer: MEDICARE

## 2025-09-05 VITALS
HEIGHT: 66 IN | BODY MASS INDEX: 27.17 KG/M2 | OXYGEN SATURATION: 96 % | SYSTOLIC BLOOD PRESSURE: 122 MMHG | WEIGHT: 169.06 LBS | DIASTOLIC BLOOD PRESSURE: 70 MMHG | HEART RATE: 74 BPM

## 2025-09-05 DIAGNOSIS — I11.9 HYPERTENSIVE LEFT VENTRICULAR HYPERTROPHY, WITHOUT HEART FAILURE: ICD-10-CM

## 2025-09-05 DIAGNOSIS — N18.31 STAGE 3A CHRONIC KIDNEY DISEASE: ICD-10-CM

## 2025-09-05 DIAGNOSIS — R09.89 CAROTID BRUIT, UNSPECIFIED LATERALITY: ICD-10-CM

## 2025-09-05 DIAGNOSIS — I25.3 ATRIAL SEPTAL ANEURYSM: Primary | ICD-10-CM

## 2025-09-05 PROCEDURE — 99999 PR PBB SHADOW E&M-EST. PATIENT-LVL V: CPT | Mod: PBBFAC,,, | Performed by: INTERNAL MEDICINE

## (undated) DEVICE — NDL HYPO REG 25G X 1 1/2

## (undated) DEVICE — GAUZE SPONGE PEANUT STRL

## (undated) DEVICE — SUT MCRYL PLUS 4-0 PS2 27IN

## (undated) DEVICE — SUT MONOCRYL 4-0 PS-2

## (undated) DEVICE — CLIP MED TICALL

## (undated) DEVICE — TRAY MINOR GEN SURG

## (undated) DEVICE — CONTAINER SPECIMEN STRL 4OZ

## (undated) DEVICE — APPLIER CLIP EPIX UNIV 5X34

## (undated) DEVICE — CONTAINER SPECIMEN OR STER 4OZ

## (undated) DEVICE — STRAP OR TABLE 5IN X 72IN

## (undated) DEVICE — SUT VICRYL 3-0 27 SH

## (undated) DEVICE — COVER LIGHT HANDLE 80/CA

## (undated) DEVICE — NDL HYPODERMIC BLUNT 18G 1.5IN

## (undated) DEVICE — TUBE EMG NIM 7.0MM TRIVANTAGE

## (undated) DEVICE — LINER SUCTION 3000CC

## (undated) DEVICE — Device

## (undated) DEVICE — POWDER ARISTA AH 1GM

## (undated) DEVICE — SCRUB HIBICLENS 4% CHG 4OZ

## (undated) DEVICE — SYR DISP LL 5CC

## (undated) DEVICE — SET TUBE PNEUMOCLEAR SE HI FLO

## (undated) DEVICE — SYS LABEL CORRECT MED

## (undated) DEVICE — PAD OB HS-77 STRL PREPACK 12S

## (undated) DEVICE — CLOSURE SKIN STERI STRIP 1/2X4

## (undated) DEVICE — PACK SET UP 190 OMC-NS

## (undated) DEVICE — DRAPE INCISE IOBAN 2 23X17IN

## (undated) DEVICE — SUT VICRYL 4-0 RB1 27IN UD

## (undated) DEVICE — APPLICATOR CHLORAPREP CLR 10.5

## (undated) DEVICE — TOWEL OR DISP STRL BLUE 4/PK

## (undated) DEVICE — BLADE SURG #15 CARBON STEEL

## (undated) DEVICE — NDL HYPO SAFETY 25GX1.5IN

## (undated) DEVICE — GLOVE SURG ULTRA TOUCH 7.5

## (undated) DEVICE — SUT 2-0 12-18IN SILK

## (undated) DEVICE — ELECTRODE REM PLYHSV RETURN 9

## (undated) DEVICE — SLEEVE SCD EXPRESS CALF MEDIUM

## (undated) DEVICE — DRAPE CORETEMP FLD WRM 56X62IN

## (undated) DEVICE — SPONGE GAUZE 16PLY 4X4

## (undated) DEVICE — SUT 4-0 VICRYL / SH

## (undated) DEVICE — SUT 3-0 12-18IN SILK

## (undated) DEVICE — BAG TISS RETRV MONARCH 10MM

## (undated) DEVICE — SEE MEDLINE ITEM 154981

## (undated) DEVICE — DRESSING TRANS 4X4 TEGADERM

## (undated) DEVICE — DISSECTOR LIGASURE EXACT 21CM

## (undated) DEVICE — SEE MEDLINE ITEM 157194

## (undated) DEVICE — SYR 10CC LUER LOCK

## (undated) DEVICE — SOL IRR NACL .9% 3000ML

## (undated) DEVICE — TROCAR ENDO Z THREAD KII 5X100

## (undated) DEVICE — PACK CUSTOM ENDO CHOLO SLI

## (undated) DEVICE — DRAPE STERI INSTRUMENT 1018

## (undated) DEVICE — SUT LIGACLIP SMALL XTRA

## (undated) DEVICE — TROCAR ENDOPATH XCEL 5X100MM

## (undated) DEVICE — PROBE SIMULATOR KRAFF

## (undated) DEVICE — SUT 4-0 12-18IN SILK BLACK

## (undated) DEVICE — SHEET DRAPE MEDIUM

## (undated) DEVICE — NDL SAFETY 25G X 1.5 ECLIPSE

## (undated) DEVICE — HEMOSTAT SURGICEL FIBRLR 1X2IN

## (undated) DEVICE — ELECTRODE BLADE INSULATED 1 IN

## (undated) DEVICE — TUBING MINIBORE EXTENSION

## (undated) DEVICE — GOWN SURGICAL X-LARGE

## (undated) DEVICE — CHLORAPREP 10.5 ML APPLICATOR

## (undated) DEVICE — WATER STERILE INJ 500ML BAG

## (undated) DEVICE — SEE MEDLINE ITEM 152532

## (undated) DEVICE — TRAY FOLEY 16FR INFECTION CONT

## (undated) DEVICE — STERILE WATER 1000ML BOTTLE

## (undated) DEVICE — MARKER SKIN STND TIP BLUE BARR

## (undated) DEVICE — NDL TUOHY EPIDURAL 20G X 3.5

## (undated) DEVICE — SUT ETHILON 3-0 PS2 18 BLK

## (undated) DEVICE — NDL SAFETY 21G X 1 1/2 ECLPSE

## (undated) DEVICE — PENCIL ROCKER SWITCH 10FT CORD

## (undated) DEVICE — HEMOSTAT SURGICEL 2X14IN

## (undated) DEVICE — APPLICATOR CHLORAPREP ORN 26ML

## (undated) DEVICE — SET BASIN 48X48IN 6000ML RING

## (undated) DEVICE — SUT MONOCRYL 5-0 P-3 UND 18

## (undated) DEVICE — CLIP LIGACLIP XTRA TITANIUM

## (undated) DEVICE — SUT 2/0 30IN SILK BLK BRAI

## (undated) DEVICE — SYR GLASS 5CC LUER LOK

## (undated) DEVICE — TROCAR KII BLLN 12MM 10CM

## (undated) DEVICE — ADHESIVE DERMABOND ADVANCED

## (undated) DEVICE — SEE MEDLINE ITEM 152622

## (undated) DEVICE — SHEET EENT SPLIT

## (undated) DEVICE — SCRUB DYNA-HEX LIQ 4% CHG 4OZ

## (undated) DEVICE — KIT ANTIFOG

## (undated) DEVICE — SKINMARKER & RULER REGULAR X-F

## (undated) DEVICE — SPONGE DERMACEA GAUZE 4X4

## (undated) DEVICE — TRAY DRY SPONGE SCRUB W FOAM

## (undated) DEVICE — IRRIGATOR SUCTION W/TIP

## (undated) DEVICE — SCRUB 10% POVIDONE IODINE 4OZ

## (undated) DEVICE — GOWN POLY REINF BRTH SLV 2XL

## (undated) DEVICE — SET CYSTO IRRIGATION UNIV SPIK

## (undated) DEVICE — SCISSOR 5MMX35CM DIRECT DRIVE

## (undated) DEVICE — HEMOSTAT SURGICEL NU-KNIT 6X9

## (undated) DEVICE — CORD BIPOLAR 12 FOOT

## (undated) DEVICE — GOWN POLY REINF BRTH SLV XL

## (undated) DEVICE — SEE MEDLINE ITEM 157117

## (undated) DEVICE — NDL SPINAL SPINOCAN 22GX3.5

## (undated) DEVICE — SUT PROLENE 4-0 RB-1 BL MO

## (undated) DEVICE — SOL WATER STRL IRR 1000ML

## (undated) DEVICE — ELECTRODE NDL

## (undated) DEVICE — DISSECTOR CURVED 5DCD

## (undated) DEVICE — SEE MEDLINE ITEM 157128

## (undated) DEVICE — BLADE SURG CARBON STEEL SZ11

## (undated) DEVICE — SOL 9P NACL IRR PIC IL

## (undated) DEVICE — NDL BLUNT W/O FILTER 18GX1.5IN

## (undated) DEVICE — SEE MEDLINE ITEM 157116

## (undated) DEVICE — GOWN X-LG STERILE BACK

## (undated) DEVICE — ADHESIVE DERMABOND MINI HV

## (undated) DEVICE — SUT 3-0 VICRYL / SH (J416)

## (undated) DEVICE — STIMULATOR NRVE MINI VARI-STIM

## (undated) DEVICE — SEE MEDLINE ITEM 157150

## (undated) DEVICE — ADHESIVE MASTISOL VIAL 48/BX

## (undated) DEVICE — GLOVE BIOGEL ORTHOPEDIC 7

## (undated) DEVICE — SUT 0 VICRYL / UR6 (J603)

## (undated) DEVICE — POSITIONER HEAD DONUT 9IN FOAM

## (undated) DEVICE — GLOVE SENSICARE PI GRN 7.5

## (undated) DEVICE — SUT VICRYL PLUS 3-0 SH 18IN

## (undated) DEVICE — SPONGE LAP 18X18 PREWASHED

## (undated) DEVICE — HOOK LONE STAR BLUNT 12MM

## (undated) DEVICE — SEE MEDLINE ITEM 157166